# Patient Record
Sex: MALE | Race: BLACK OR AFRICAN AMERICAN | NOT HISPANIC OR LATINO | Employment: OTHER | RURAL
[De-identification: names, ages, dates, MRNs, and addresses within clinical notes are randomized per-mention and may not be internally consistent; named-entity substitution may affect disease eponyms.]

---

## 2021-01-01 ENCOUNTER — ANESTHESIA (OUTPATIENT)
Dept: SURGERY | Facility: HOSPITAL | Age: 66
DRG: 853 | End: 2021-01-01
Payer: MEDICARE

## 2021-01-01 ENCOUNTER — OFFICE VISIT (OUTPATIENT)
Dept: PRIMARY CARE CLINIC | Facility: CLINIC | Age: 66
End: 2021-01-01
Payer: MEDICARE

## 2021-01-01 ENCOUNTER — TELEPHONE (OUTPATIENT)
Dept: PRIMARY CARE CLINIC | Facility: CLINIC | Age: 66
End: 2021-01-01

## 2021-01-01 ENCOUNTER — ANESTHESIA EVENT (OUTPATIENT)
Dept: SURGERY | Facility: HOSPITAL | Age: 66
DRG: 853 | End: 2021-01-01
Payer: MEDICARE

## 2021-01-01 ENCOUNTER — HOSPITAL ENCOUNTER (INPATIENT)
Facility: HOSPITAL | Age: 66
LOS: 184 days | DRG: 003 | End: 2022-06-25
Attending: INTERNAL MEDICINE | Admitting: INTERNAL MEDICINE
Payer: MEDICARE

## 2021-01-01 ENCOUNTER — ANESTHESIA (OUTPATIENT)
Dept: SURGERY | Facility: HOSPITAL | Age: 66
End: 2021-01-01
Payer: MEDICARE

## 2021-01-01 ENCOUNTER — ANESTHESIA EVENT (OUTPATIENT)
Dept: SURGERY | Facility: HOSPITAL | Age: 66
End: 2021-01-01
Payer: MEDICARE

## 2021-01-01 ENCOUNTER — TELEPHONE (OUTPATIENT)
Dept: PRIMARY CARE CLINIC | Facility: CLINIC | Age: 66
End: 2021-01-01
Payer: MEDICARE

## 2021-01-01 ENCOUNTER — HOSPITAL ENCOUNTER (INPATIENT)
Facility: HOSPITAL | Age: 66
LOS: 10 days | Discharge: NURSING, REHAB OR LONG-TERM CARE | DRG: 853 | End: 2021-12-23
Attending: SURGERY | Admitting: SURGERY
Payer: MEDICARE

## 2021-01-01 VITALS
HEART RATE: 77 BPM | RESPIRATION RATE: 16 BRPM | OXYGEN SATURATION: 100 % | TEMPERATURE: 98 F | OXYGEN SATURATION: 99 % | WEIGHT: 211.88 LBS | HEIGHT: 71 IN | BODY MASS INDEX: 29.66 KG/M2 | DIASTOLIC BLOOD PRESSURE: 72 MMHG | SYSTOLIC BLOOD PRESSURE: 169 MMHG | SYSTOLIC BLOOD PRESSURE: 119 MMHG | HEART RATE: 94 BPM | DIASTOLIC BLOOD PRESSURE: 55 MMHG

## 2021-01-01 VITALS
HEIGHT: 71 IN | BODY MASS INDEX: 28 KG/M2 | TEMPERATURE: 98 F | RESPIRATION RATE: 18 BRPM | DIASTOLIC BLOOD PRESSURE: 86 MMHG | SYSTOLIC BLOOD PRESSURE: 132 MMHG | HEART RATE: 61 BPM | WEIGHT: 200 LBS | OXYGEN SATURATION: 99 %

## 2021-01-01 DIAGNOSIS — N49.3 FOURNIER'S GANGRENE IN MALE: ICD-10-CM

## 2021-01-01 DIAGNOSIS — I10 HYPERTENSION, UNSPECIFIED TYPE: Primary | ICD-10-CM

## 2021-01-01 DIAGNOSIS — I69.398 ABNORMALITY OF GAIT AS LATE EFFECT OF CEREBROVASCULAR ACCIDENT (CVA): ICD-10-CM

## 2021-01-01 DIAGNOSIS — D62 ACUTE BLOOD LOSS ANEMIA: ICD-10-CM

## 2021-01-01 DIAGNOSIS — Z13.220 ENCOUNTER FOR SCREENING FOR LIPID DISORDER: ICD-10-CM

## 2021-01-01 DIAGNOSIS — T14.8XXA ABRASION: ICD-10-CM

## 2021-01-01 DIAGNOSIS — N17.9 AKI (ACUTE KIDNEY INJURY): ICD-10-CM

## 2021-01-01 DIAGNOSIS — R65.21 SEPTIC SHOCK: ICD-10-CM

## 2021-01-01 DIAGNOSIS — S61.401A OPEN WOUND OF RIGHT HAND WITHOUT FOREIGN BODY, UNSPECIFIED WOUND TYPE, INITIAL ENCOUNTER: ICD-10-CM

## 2021-01-01 DIAGNOSIS — N49.3 FOURNIER GANGRENE: ICD-10-CM

## 2021-01-01 DIAGNOSIS — I96 GANGRENE: ICD-10-CM

## 2021-01-01 DIAGNOSIS — M72.6 NECROTIZING FASCIITIS: ICD-10-CM

## 2021-01-01 DIAGNOSIS — R57.9 SHOCK: ICD-10-CM

## 2021-01-01 DIAGNOSIS — A41.9 SEPTIC SHOCK: ICD-10-CM

## 2021-01-01 DIAGNOSIS — L97.914: ICD-10-CM

## 2021-01-01 DIAGNOSIS — L97.514: ICD-10-CM

## 2021-01-01 DIAGNOSIS — N17.9 AKI (ACUTE KIDNEY INJURY): Primary | ICD-10-CM

## 2021-01-01 DIAGNOSIS — R26.9 ABNORMALITY OF GAIT AS LATE EFFECT OF CEREBROVASCULAR ACCIDENT (CVA): ICD-10-CM

## 2021-01-01 DIAGNOSIS — I39 ENDOCARDITIS IN DISEASES CLASSIFIED ELSEWHERE: ICD-10-CM

## 2021-01-01 DIAGNOSIS — J96.20 ACUTE ON CHRONIC RESPIRATORY FAILURE, UNSPECIFIED WHETHER WITH HYPOXIA OR HYPERCAPNIA: ICD-10-CM

## 2021-01-01 DIAGNOSIS — L89.620 PRESSURE ULCER OF LEFT HEEL, UNSTAGEABLE: ICD-10-CM

## 2021-01-01 DIAGNOSIS — I10 HYPERTENSION, UNSPECIFIED TYPE: ICD-10-CM

## 2021-01-01 DIAGNOSIS — I25.10 CORONARY ARTERY DISEASE, UNSPECIFIED VESSEL OR LESION TYPE, UNSPECIFIED WHETHER ANGINA PRESENT, UNSPECIFIED WHETHER NATIVE OR TRANSPLANTED HEART: Primary | ICD-10-CM

## 2021-01-01 DIAGNOSIS — I10 HYPERTENSION: ICD-10-CM

## 2021-01-01 DIAGNOSIS — I95.9 HYPOTENSION, UNSPECIFIED HYPOTENSION TYPE: ICD-10-CM

## 2021-01-01 DIAGNOSIS — S61.401D OPEN WOUND OF RIGHT HAND WITHOUT FOREIGN BODY, UNSPECIFIED WOUND TYPE, SUBSEQUENT ENCOUNTER: ICD-10-CM

## 2021-01-01 DIAGNOSIS — Z99.11 ON MECHANICALLY ASSISTED VENTILATION: ICD-10-CM

## 2021-01-01 DIAGNOSIS — I46.9 PEA (PULSELESS ELECTRICAL ACTIVITY): ICD-10-CM

## 2021-01-01 DIAGNOSIS — R10.9 ABDOMINAL PAIN: ICD-10-CM

## 2021-01-01 DIAGNOSIS — L89.154 PRESSURE ULCER OF SACRAL REGION, STAGE 4: ICD-10-CM

## 2021-01-01 DIAGNOSIS — N49.3 FOURNIER GANGRENE: Primary | ICD-10-CM

## 2021-01-01 DIAGNOSIS — L89.150 PRESSURE ULCER OF SACRAL REGION, UNSTAGEABLE: ICD-10-CM

## 2021-01-01 DIAGNOSIS — Q24.9 CARDIAC ABNORMALITY: ICD-10-CM

## 2021-01-01 DIAGNOSIS — R10.84 GENERALIZED ABDOMINAL PAIN: ICD-10-CM

## 2021-01-01 LAB
ABO + RH BLD: NORMAL
ALBUMIN SERPL BCP-MCNC: 1 G/DL (ref 3.5–5)
ALBUMIN SERPL BCP-MCNC: 1.1 G/DL (ref 3.5–5)
ALBUMIN SERPL BCP-MCNC: 1.2 G/DL (ref 3.5–5)
ALBUMIN SERPL BCP-MCNC: 1.7 G/DL (ref 3.5–5)
ALBUMIN SERPL BCP-MCNC: 2.5 G/DL (ref 3.5–5)
ALBUMIN SERPL BCP-MCNC: 3.4 G/DL (ref 3.5–5)
ALBUMIN/GLOB SERPL: 0.2 {RATIO}
ALBUMIN/GLOB SERPL: 0.2 {RATIO}
ALBUMIN/GLOB SERPL: 0.3 {RATIO}
ALBUMIN/GLOB SERPL: 0.4 {RATIO}
ALBUMIN/GLOB SERPL: 0.4 {RATIO}
ALBUMIN/GLOB SERPL: 0.8 {RATIO}
ALP SERPL-CCNC: 113 U/L (ref 45–115)
ALP SERPL-CCNC: 155 U/L (ref 45–115)
ALP SERPL-CCNC: 159 U/L (ref 45–115)
ALP SERPL-CCNC: 69 U/L (ref 45–115)
ALP SERPL-CCNC: 72 U/L (ref 45–115)
ALP SERPL-CCNC: 74 U/L (ref 45–115)
ALT SERPL W P-5'-P-CCNC: 12 U/L (ref 16–61)
ALT SERPL W P-5'-P-CCNC: 13 U/L (ref 16–61)
ALT SERPL W P-5'-P-CCNC: 24 U/L (ref 16–61)
ALT SERPL W P-5'-P-CCNC: 43 U/L (ref 16–61)
ALT SERPL W P-5'-P-CCNC: 71 U/L (ref 16–61)
ALT SERPL W P-5'-P-CCNC: 83 U/L (ref 16–61)
AMYLASE SERPL-CCNC: 53 U/L (ref 25–115)
ANION GAP SERPL CALCULATED.3IONS-SCNC: 11 MMOL/L (ref 7–16)
ANION GAP SERPL CALCULATED.3IONS-SCNC: 20 MMOL/L (ref 7–16)
ANION GAP SERPL CALCULATED.3IONS-SCNC: 21 MMOL/L (ref 7–16)
ANION GAP SERPL CALCULATED.3IONS-SCNC: 23 MMOL/L (ref 7–16)
ANION GAP SERPL CALCULATED.3IONS-SCNC: 24 MMOL/L (ref 7–16)
ANION GAP SERPL CALCULATED.3IONS-SCNC: 25 MMOL/L (ref 7–16)
ANION GAP SERPL CALCULATED.3IONS-SCNC: 25 MMOL/L (ref 7–16)
ANION GAP SERPL CALCULATED.3IONS-SCNC: 26 MMOL/L (ref 7–16)
ANION GAP SERPL CALCULATED.3IONS-SCNC: 26 MMOL/L (ref 7–16)
ANION GAP SERPL CALCULATED.3IONS-SCNC: 27 MMOL/L (ref 7–16)
ANION GAP SERPL CALCULATED.3IONS-SCNC: 28 MMOL/L (ref 7–16)
ANION GAP SERPL CALCULATED.3IONS-SCNC: 29 MMOL/L (ref 7–16)
ANION GAP SERPL CALCULATED.3IONS-SCNC: 30 MMOL/L (ref 7–16)
ANION GAP SERPL CALCULATED.3IONS-SCNC: 30 MMOL/L (ref 7–16)
ANION GAP SERPL CALCULATED.3IONS-SCNC: 31 MMOL/L (ref 7–16)
ANION GAP SERPL CALCULATED.3IONS-SCNC: 33 MMOL/L (ref 7–16)
ANISOCYTOSIS BLD QL SMEAR: ABNORMAL
AORTIC ROOT ANNULUS: 3.5 CM
AORTIC VALVE CUSP SEPERATION: 26.2 CM
AST SERPL W P-5'-P-CCNC: 141 U/L (ref 15–37)
AST SERPL W P-5'-P-CCNC: 159 U/L (ref 15–37)
AST SERPL W P-5'-P-CCNC: 175 U/L (ref 15–37)
AST SERPL W P-5'-P-CCNC: 19 U/L (ref 15–37)
AST SERPL W P-5'-P-CCNC: 44 U/L (ref 15–37)
AST SERPL W P-5'-P-CCNC: 52 U/L (ref 15–37)
AV INDEX (PROSTH): 0.8
AV MEAN GRADIENT: 4 MMHG
AV PEAK GRADIENT: 5 MMHG
AV VALVE AREA: 2.77 CM2
AV VELOCITY RATIO: 0.91
BACTERIA #/AREA URNS HPF: ABNORMAL /HPF
BACTERIA BLD CULT: NORMAL
BACTERIA BLD CULT: NORMAL
BACTERIA SPEC ANAEROBE CULT: ABNORMAL
BASO STIPL BLD QL SMEAR: ABNORMAL
BASOPHILS # BLD AUTO: 0.02 K/UL (ref 0–0.2)
BASOPHILS # BLD AUTO: 0.03 K/UL (ref 0–0.2)
BASOPHILS # BLD AUTO: 0.04 K/UL (ref 0–0.2)
BASOPHILS # BLD AUTO: 0.05 K/UL (ref 0–0.2)
BASOPHILS # BLD AUTO: 0.06 K/UL (ref 0–0.2)
BASOPHILS # BLD AUTO: 0.08 K/UL (ref 0–0.2)
BASOPHILS # BLD AUTO: 0.09 K/UL (ref 0–0.2)
BASOPHILS NFR BLD AUTO: 0.1 % (ref 0–1)
BASOPHILS NFR BLD AUTO: 0.1 % (ref 0–1)
BASOPHILS NFR BLD AUTO: 0.2 % (ref 0–1)
BASOPHILS NFR BLD AUTO: 0.3 % (ref 0–1)
BASOPHILS NFR BLD AUTO: 0.4 % (ref 0–1)
BASOPHILS NFR BLD AUTO: 0.5 % (ref 0–1)
BASOPHILS NFR BLD AUTO: 0.8 % (ref 0–1)
BASOPHILS NFR BLD AUTO: 1.3 % (ref 0–1)
BILIRUB SERPL-MCNC: 0.2 MG/DL (ref 0–1.2)
BILIRUB SERPL-MCNC: 0.5 MG/DL (ref 0–1.2)
BILIRUB SERPL-MCNC: 0.5 MG/DL (ref 0–1.2)
BILIRUB SERPL-MCNC: 1.2 MG/DL (ref 0–1.2)
BILIRUB SERPL-MCNC: 2.8 MG/DL (ref 0–1.2)
BILIRUB SERPL-MCNC: 2.9 MG/DL (ref 0–1.2)
BILIRUB UR QL STRIP: NEGATIVE
BLD PROD TYP BPU: NORMAL
BLOOD UNIT EXPIRATION DATE: NORMAL
BLOOD UNIT TYPE CODE: 5100
BLOOD UNIT TYPE CODE: 9500
BSA FOR ECHO PROCEDURE: 2.15 M2
BUN SERPL-MCNC: 107 MG/DL (ref 7–18)
BUN SERPL-MCNC: 112 MG/DL (ref 7–18)
BUN SERPL-MCNC: 116 MG/DL (ref 7–18)
BUN SERPL-MCNC: 117 MG/DL (ref 7–18)
BUN SERPL-MCNC: 140 MG/DL (ref 7–18)
BUN SERPL-MCNC: 140 MG/DL (ref 7–18)
BUN SERPL-MCNC: 177 MG/DL (ref 7–18)
BUN SERPL-MCNC: 20 MG/DL (ref 7–18)
BUN SERPL-MCNC: 56 MG/DL (ref 7–18)
BUN SERPL-MCNC: 58 MG/DL (ref 7–18)
BUN SERPL-MCNC: 61 MG/DL (ref 7–18)
BUN SERPL-MCNC: 66 MG/DL (ref 7–18)
BUN SERPL-MCNC: 76 MG/DL (ref 7–18)
BUN SERPL-MCNC: 78 MG/DL (ref 7–18)
BUN SERPL-MCNC: 78 MG/DL (ref 7–18)
BUN SERPL-MCNC: 82 MG/DL (ref 7–18)
BUN SERPL-MCNC: 95 MG/DL (ref 7–18)
BUN SERPL-MCNC: 99 MG/DL (ref 7–18)
BUN/CREAT SERPL: 10 (ref 6–20)
BUN/CREAT SERPL: 10 (ref 6–20)
BUN/CREAT SERPL: 11 (ref 6–20)
BUN/CREAT SERPL: 12 (ref 6–20)
BUN/CREAT SERPL: 13 (ref 6–20)
BUN/CREAT SERPL: 15 (ref 6–20)
BUN/CREAT SERPL: 15 (ref 6–20)
BUN/CREAT SERPL: 16 (ref 6–20)
BUN/CREAT SERPL: 17 (ref 6–20)
BUN/CREAT SERPL: 18 (ref 6–20)
BUN/CREAT SERPL: 18 (ref 6–20)
BUN/CREAT SERPL: 9 (ref 6–20)
BURR CELLS BLD QL SMEAR: ABNORMAL
CA-I SERPL-MCNC: 0.75 MMOL/L (ref 1.15–1.35)
CA-I SERPL-MCNC: 0.77 MMOL/L (ref 1.15–1.35)
CA-I SERPL-MCNC: 0.82 MMOL/L (ref 1.15–1.35)
CA-I SERPL-MCNC: 0.83 MMOL/L (ref 1.15–1.35)
CA-I SERPL-MCNC: 0.92 MMOL/L (ref 1.15–1.35)
CA-I SERPL-MCNC: 0.95 MMOL/L (ref 1.15–1.35)
CALCIUM SERPL-MCNC: 5.4 MG/DL (ref 8.5–10.1)
CALCIUM SERPL-MCNC: 6.4 MG/DL (ref 8.5–10.1)
CALCIUM SERPL-MCNC: 6.6 MG/DL (ref 8.5–10.1)
CALCIUM SERPL-MCNC: 6.8 MG/DL (ref 8.5–10.1)
CALCIUM SERPL-MCNC: 6.8 MG/DL (ref 8.5–10.1)
CALCIUM SERPL-MCNC: 6.9 MG/DL (ref 8.5–10.1)
CALCIUM SERPL-MCNC: 7 MG/DL (ref 8.5–10.1)
CALCIUM SERPL-MCNC: 7.2 MG/DL (ref 8.5–10.1)
CALCIUM SERPL-MCNC: 7.3 MG/DL (ref 8.5–10.1)
CALCIUM SERPL-MCNC: 7.4 MG/DL (ref 8.5–10.1)
CALCIUM SERPL-MCNC: 7.5 MG/DL (ref 8.5–10.1)
CALCIUM SERPL-MCNC: 8 MG/DL (ref 8.5–10.1)
CALCIUM SERPL-MCNC: 8.1 MG/DL (ref 8.5–10.1)
CALCIUM SERPL-MCNC: 8.7 MG/DL (ref 8.5–10.1)
CHLORIDE SERPL-SCNC: 102 MMOL/L (ref 98–107)
CHLORIDE SERPL-SCNC: 105 MMOL/L (ref 98–107)
CHLORIDE SERPL-SCNC: 106 MMOL/L (ref 98–107)
CHLORIDE SERPL-SCNC: 107 MMOL/L (ref 98–107)
CHLORIDE SERPL-SCNC: 110 MMOL/L (ref 98–107)
CHLORIDE SERPL-SCNC: 113 MMOL/L (ref 98–107)
CHLORIDE SERPL-SCNC: 114 MMOL/L (ref 98–107)
CHLORIDE SERPL-SCNC: 84 MMOL/L (ref 98–107)
CHLORIDE SERPL-SCNC: 84 MMOL/L (ref 98–107)
CHLORIDE SERPL-SCNC: 89 MMOL/L (ref 98–107)
CHLORIDE SERPL-SCNC: 90 MMOL/L (ref 98–107)
CHLORIDE SERPL-SCNC: 93 MMOL/L (ref 98–107)
CHLORIDE SERPL-SCNC: 94 MMOL/L (ref 98–107)
CHLORIDE SERPL-SCNC: 94 MMOL/L (ref 98–107)
CHLORIDE SERPL-SCNC: 95 MMOL/L (ref 98–107)
CHLORIDE SERPL-SCNC: 96 MMOL/L (ref 98–107)
CHLORIDE UR-SCNC: 91 MMOL/L
CHOLEST SERPL-MCNC: 171 MG/DL (ref 0–200)
CHOLEST/HDLC SERPL: 4.8 {RATIO}
CLARITY UR: CLEAR
CO2 SERPL-SCNC: 12 MMOL/L (ref 21–32)
CO2 SERPL-SCNC: 13 MMOL/L (ref 21–32)
CO2 SERPL-SCNC: 13 MMOL/L (ref 21–32)
CO2 SERPL-SCNC: 14 MMOL/L (ref 21–32)
CO2 SERPL-SCNC: 15 MMOL/L (ref 21–32)
CO2 SERPL-SCNC: 15 MMOL/L (ref 21–32)
CO2 SERPL-SCNC: 16 MMOL/L (ref 21–32)
CO2 SERPL-SCNC: 17 MMOL/L (ref 21–32)
CO2 SERPL-SCNC: 20 MMOL/L (ref 21–32)
CO2 SERPL-SCNC: 21 MMOL/L (ref 21–32)
CO2 SERPL-SCNC: 21 MMOL/L (ref 21–32)
CO2 SERPL-SCNC: 22 MMOL/L (ref 21–32)
CO2 SERPL-SCNC: 24 MMOL/L (ref 21–32)
COARSE GRAN CASTS #/AREA URNS LPF: ABNORMAL /LPF
COLOR UR: YELLOW
CREAT SERPL-MCNC: 1.16 MG/DL (ref 0.7–1.3)
CREAT SERPL-MCNC: 11.01 MG/DL (ref 0.7–1.3)
CREAT SERPL-MCNC: 5.42 MG/DL (ref 0.7–1.3)
CREAT SERPL-MCNC: 6.17 MG/DL (ref 0.7–1.3)
CREAT SERPL-MCNC: 6.35 MG/DL (ref 0.7–1.3)
CREAT SERPL-MCNC: 6.36 MG/DL (ref 0.7–1.3)
CREAT SERPL-MCNC: 6.51 MG/DL (ref 0.7–1.3)
CREAT SERPL-MCNC: 6.67 MG/DL (ref 0.7–1.3)
CREAT SERPL-MCNC: 7.36 MG/DL (ref 0.7–1.3)
CREAT SERPL-MCNC: 7.79 MG/DL (ref 0.7–1.3)
CREAT SERPL-MCNC: 7.9 MG/DL (ref 0.7–1.3)
CREAT SERPL-MCNC: 7.91 MG/DL (ref 0.7–1.3)
CREAT SERPL-MCNC: 8.21 MG/DL (ref 0.7–1.3)
CREAT SERPL-MCNC: 8.29 MG/DL (ref 0.7–1.3)
CREAT SERPL-MCNC: 9.14 MG/DL (ref 0.7–1.3)
CREAT SERPL-MCNC: 9.36 MG/DL (ref 0.7–1.3)
CREAT SERPL-MCNC: 9.55 MG/DL (ref 0.7–1.3)
CREAT SERPL-MCNC: 9.63 MG/DL (ref 0.7–1.3)
CRENATED CELLS: ABNORMAL
CROSSMATCH INTERPRETATION: NORMAL
CV ECHO LV RWT: 0.74 CM
DIFFERENTIAL METHOD BLD: ABNORMAL
DISPENSE STATUS: NORMAL
DOHLE BOD BLD QL SMEAR: ABNORMAL
DOHLE BOD BLD QL SMEAR: ABNORMAL
DOP CALC AO PEAK VEL: 1.1 M/S
DOP CALC AO VTI: 20 CM
DOP CALC LVOT AREA: 3.5 CM2
DOP CALC LVOT DIAMETER: 2.1 CM
DOP CALC LVOT PEAK VEL: 1 M/S
DOP CALC LVOT STROKE VOLUME: 55.39 CM3
DOP CALCLVOT PEAK VEL VTI: 16 CM
E WAVE DECELERATION TIME: 163 MSEC
ECHO EF ESTIMATED: 55 %
ECHO LV POSTERIOR WALL: 1.57 CM (ref 0.6–1.1)
EJECTION FRACTION: 40 %
EOSINOPHIL # BLD AUTO: 0 K/UL (ref 0–0.5)
EOSINOPHIL # BLD AUTO: 0.01 K/UL (ref 0–0.5)
EOSINOPHIL # BLD AUTO: 0.02 K/UL (ref 0–0.5)
EOSINOPHIL # BLD AUTO: 0.03 K/UL (ref 0–0.5)
EOSINOPHIL # BLD AUTO: 0.04 K/UL (ref 0–0.5)
EOSINOPHIL # BLD AUTO: 0.05 K/UL (ref 0–0.5)
EOSINOPHIL # BLD AUTO: 0.07 K/UL (ref 0–0.5)
EOSINOPHIL # BLD AUTO: 0.07 K/UL (ref 0–0.5)
EOSINOPHIL # BLD AUTO: 0.08 K/UL (ref 0–0.5)
EOSINOPHIL # BLD AUTO: 0.1 K/UL (ref 0–0.5)
EOSINOPHIL # BLD AUTO: 0.12 K/UL (ref 0–0.5)
EOSINOPHIL # BLD AUTO: 0.24 K/UL (ref 0–0.5)
EOSINOPHIL NFR BLD AUTO: 0 % (ref 1–4)
EOSINOPHIL NFR BLD AUTO: 0.1 % (ref 1–4)
EOSINOPHIL NFR BLD AUTO: 0.2 % (ref 1–4)
EOSINOPHIL NFR BLD AUTO: 0.3 % (ref 1–4)
EOSINOPHIL NFR BLD AUTO: 0.3 % (ref 1–4)
EOSINOPHIL NFR BLD AUTO: 0.4 % (ref 1–4)
EOSINOPHIL NFR BLD AUTO: 0.4 % (ref 1–4)
EOSINOPHIL NFR BLD AUTO: 0.5 % (ref 1–4)
EOSINOPHIL NFR BLD AUTO: 0.7 % (ref 1–4)
EOSINOPHIL NFR BLD AUTO: 5.1 % (ref 1–4)
EOSINOPHIL NFR BLD MANUAL: 1 % (ref 1–4)
EOSINOPHIL NFR BLD MANUAL: 1 % (ref 1–4)
EOSINOPHIL NFR BLD MANUAL: 3 % (ref 1–4)
ERYTHROCYTE [DISTWIDTH] IN BLOOD BY AUTOMATED COUNT: 13.3 % (ref 11.5–14.5)
ERYTHROCYTE [DISTWIDTH] IN BLOOD BY AUTOMATED COUNT: 13.8 % (ref 11.5–14.5)
ERYTHROCYTE [DISTWIDTH] IN BLOOD BY AUTOMATED COUNT: 14.1 % (ref 11.5–14.5)
ERYTHROCYTE [DISTWIDTH] IN BLOOD BY AUTOMATED COUNT: 14.3 % (ref 11.5–14.5)
ERYTHROCYTE [DISTWIDTH] IN BLOOD BY AUTOMATED COUNT: 14.3 % (ref 11.5–14.5)
ERYTHROCYTE [DISTWIDTH] IN BLOOD BY AUTOMATED COUNT: 14.5 % (ref 11.5–14.5)
ERYTHROCYTE [DISTWIDTH] IN BLOOD BY AUTOMATED COUNT: 14.6 % (ref 11.5–14.5)
ERYTHROCYTE [DISTWIDTH] IN BLOOD BY AUTOMATED COUNT: 14.6 % (ref 11.5–14.5)
ERYTHROCYTE [DISTWIDTH] IN BLOOD BY AUTOMATED COUNT: 14.8 % (ref 11.5–14.5)
ERYTHROCYTE [DISTWIDTH] IN BLOOD BY AUTOMATED COUNT: 16.2 % (ref 11.5–14.5)
ERYTHROCYTE [DISTWIDTH] IN BLOOD BY AUTOMATED COUNT: 16.3 % (ref 11.5–14.5)
ERYTHROCYTE [DISTWIDTH] IN BLOOD BY AUTOMATED COUNT: 17.2 % (ref 11.5–14.5)
ERYTHROCYTE [DISTWIDTH] IN BLOOD BY AUTOMATED COUNT: 17.5 % (ref 11.5–14.5)
ERYTHROCYTE [DISTWIDTH] IN BLOOD BY AUTOMATED COUNT: 17.6 % (ref 11.5–14.5)
ERYTHROCYTE [DISTWIDTH] IN BLOOD BY AUTOMATED COUNT: 17.8 % (ref 11.5–14.5)
ERYTHROCYTE [DISTWIDTH] IN BLOOD BY AUTOMATED COUNT: 18.4 % (ref 11.5–14.5)
ESTROGEN SERPL-MCNC: NORMAL PG/ML
FLUAV AG UPPER RESP QL IA.RAPID: NEGATIVE
FLUBV AG UPPER RESP QL IA.RAPID: NEGATIVE
FRACTIONAL SHORTENING: 24 % (ref 28–44)
GENTAMICIN SERPL-MCNC: 1 ΜG/ML (ref 0–1.9)
GLOBULIN SER-MCNC: 4.1 G/DL (ref 2–4)
GLOBULIN SER-MCNC: 4.7 G/DL (ref 2–4)
GLOBULIN SER-MCNC: 4.8 G/DL (ref 2–4)
GLOBULIN SER-MCNC: 5.6 G/DL (ref 2–4)
GLOBULIN SER-MCNC: 6.2 G/DL (ref 2–4)
GLOBULIN SER-MCNC: 6.2 G/DL (ref 2–4)
GLUCOSE SERPL-MCNC: 101 MG/DL (ref 70–105)
GLUCOSE SERPL-MCNC: 104 MG/DL (ref 70–105)
GLUCOSE SERPL-MCNC: 111 MG/DL (ref 70–105)
GLUCOSE SERPL-MCNC: 112 MG/DL (ref 70–105)
GLUCOSE SERPL-MCNC: 113 MG/DL (ref 74–106)
GLUCOSE SERPL-MCNC: 119 MG/DL (ref 70–105)
GLUCOSE SERPL-MCNC: 121 MG/DL (ref 70–105)
GLUCOSE SERPL-MCNC: 124 MG/DL (ref 70–105)
GLUCOSE SERPL-MCNC: 128 MG/DL (ref 74–106)
GLUCOSE SERPL-MCNC: 132 MG/DL (ref 70–105)
GLUCOSE SERPL-MCNC: 136 MG/DL (ref 74–106)
GLUCOSE SERPL-MCNC: 145 MG/DL (ref 70–105)
GLUCOSE SERPL-MCNC: 148 MG/DL (ref 70–105)
GLUCOSE SERPL-MCNC: 152 MG/DL (ref 70–105)
GLUCOSE SERPL-MCNC: 153 MG/DL (ref 74–106)
GLUCOSE SERPL-MCNC: 154 MG/DL (ref 70–105)
GLUCOSE SERPL-MCNC: 156 MG/DL (ref 74–106)
GLUCOSE SERPL-MCNC: 161 MG/DL (ref 70–105)
GLUCOSE SERPL-MCNC: 161 MG/DL (ref 74–106)
GLUCOSE SERPL-MCNC: 162 MG/DL (ref 70–105)
GLUCOSE SERPL-MCNC: 163 MG/DL (ref 70–105)
GLUCOSE SERPL-MCNC: 168 MG/DL (ref 70–105)
GLUCOSE SERPL-MCNC: 169 MG/DL (ref 70–105)
GLUCOSE SERPL-MCNC: 169 MG/DL (ref 74–106)
GLUCOSE SERPL-MCNC: 169 MG/DL (ref 74–106)
GLUCOSE SERPL-MCNC: 173 MG/DL (ref 70–105)
GLUCOSE SERPL-MCNC: 174 MG/DL (ref 70–105)
GLUCOSE SERPL-MCNC: 176 MG/DL (ref 70–105)
GLUCOSE SERPL-MCNC: 176 MG/DL (ref 74–106)
GLUCOSE SERPL-MCNC: 178 MG/DL (ref 70–105)
GLUCOSE SERPL-MCNC: 178 MG/DL (ref 70–105)
GLUCOSE SERPL-MCNC: 182 MG/DL (ref 70–105)
GLUCOSE SERPL-MCNC: 182 MG/DL (ref 70–105)
GLUCOSE SERPL-MCNC: 182 MG/DL (ref 74–106)
GLUCOSE SERPL-MCNC: 183 MG/DL (ref 70–105)
GLUCOSE SERPL-MCNC: 184 MG/DL (ref 70–105)
GLUCOSE SERPL-MCNC: 187 MG/DL (ref 70–105)
GLUCOSE SERPL-MCNC: 189 MG/DL (ref 74–106)
GLUCOSE SERPL-MCNC: 190 MG/DL (ref 70–105)
GLUCOSE SERPL-MCNC: 190 MG/DL (ref 70–105)
GLUCOSE SERPL-MCNC: 191 MG/DL (ref 70–105)
GLUCOSE SERPL-MCNC: 191 MG/DL (ref 70–105)
GLUCOSE SERPL-MCNC: 193 MG/DL (ref 70–105)
GLUCOSE SERPL-MCNC: 195 MG/DL (ref 70–105)
GLUCOSE SERPL-MCNC: 197 MG/DL (ref 70–105)
GLUCOSE SERPL-MCNC: 198 MG/DL (ref 70–105)
GLUCOSE SERPL-MCNC: 200 MG/DL (ref 70–105)
GLUCOSE SERPL-MCNC: 203 MG/DL (ref 70–105)
GLUCOSE SERPL-MCNC: 204 MG/DL (ref 70–105)
GLUCOSE SERPL-MCNC: 205 MG/DL (ref 70–105)
GLUCOSE SERPL-MCNC: 217 MG/DL (ref 70–105)
GLUCOSE SERPL-MCNC: 219 MG/DL (ref 70–105)
GLUCOSE SERPL-MCNC: 225 MG/DL (ref 70–105)
GLUCOSE SERPL-MCNC: 241 MG/DL (ref 70–105)
GLUCOSE SERPL-MCNC: 246 MG/DL (ref 70–105)
GLUCOSE SERPL-MCNC: 251 MG/DL (ref 74–106)
GLUCOSE SERPL-MCNC: 262 MG/DL (ref 70–105)
GLUCOSE SERPL-MCNC: 275 MG/DL (ref 70–105)
GLUCOSE SERPL-MCNC: 282 MG/DL (ref 74–106)
GLUCOSE SERPL-MCNC: 283 MG/DL (ref 70–105)
GLUCOSE SERPL-MCNC: 296 MG/DL (ref 74–106)
GLUCOSE SERPL-MCNC: 299 MG/DL (ref 70–105)
GLUCOSE SERPL-MCNC: 299 MG/DL (ref 70–105)
GLUCOSE SERPL-MCNC: 304 MG/DL (ref 70–105)
GLUCOSE SERPL-MCNC: 311 MG/DL (ref 70–105)
GLUCOSE SERPL-MCNC: 312 MG/DL (ref 70–105)
GLUCOSE SERPL-MCNC: 313 MG/DL (ref 70–105)
GLUCOSE SERPL-MCNC: 318 MG/DL (ref 70–105)
GLUCOSE SERPL-MCNC: 319 MG/DL (ref 70–105)
GLUCOSE SERPL-MCNC: 320 MG/DL (ref 70–105)
GLUCOSE SERPL-MCNC: 327 MG/DL (ref 74–106)
GLUCOSE SERPL-MCNC: 328 MG/DL (ref 74–106)
GLUCOSE SERPL-MCNC: 341 MG/DL (ref 70–105)
GLUCOSE SERPL-MCNC: 347 MG/DL (ref 74–106)
GLUCOSE SERPL-MCNC: 349 MG/DL (ref 70–105)
GLUCOSE SERPL-MCNC: 350 MG/DL (ref 70–105)
GLUCOSE SERPL-MCNC: 367 MG/DL (ref 70–105)
GLUCOSE SERPL-MCNC: 394 MG/DL (ref 70–105)
GLUCOSE SERPL-MCNC: 427 MG/DL (ref 70–105)
GLUCOSE SERPL-MCNC: 428 MG/DL (ref 70–105)
GLUCOSE SERPL-MCNC: 429 MG/DL (ref 70–105)
GLUCOSE SERPL-MCNC: 68 MG/DL (ref 70–105)
GLUCOSE SERPL-MCNC: 69 MG/DL (ref 70–105)
GLUCOSE SERPL-MCNC: 76 MG/DL (ref 70–105)
GLUCOSE SERPL-MCNC: 77 MG/DL (ref 70–105)
GLUCOSE SERPL-MCNC: 82 MG/DL (ref 70–105)
GLUCOSE SERPL-MCNC: 83 MG/DL (ref 70–105)
GLUCOSE SERPL-MCNC: 86 MG/DL (ref 74–106)
GLUCOSE SERPL-MCNC: 97 MG/DL (ref 70–105)
GLUCOSE UR STRIP-MCNC: 250 MG/DL
HAV IGM SER QL: NORMAL
HBV CORE IGM SER QL: NORMAL
HBV SURFACE AG SERPL QL IA: NORMAL
HCO3 UR-SCNC: 12.3 MMOL/L (ref 21–28)
HCO3 UR-SCNC: 14.7 MMOL/L (ref 21–28)
HCO3 UR-SCNC: 16.6 MMOL/L (ref 21–28)
HCO3 UR-SCNC: 19.7 MMOL/L (ref 21–28)
HCO3 UR-SCNC: 19.8 MMOL/L (ref 21–28)
HCO3 UR-SCNC: 19.9 MMOL/L (ref 21–28)
HCO3 UR-SCNC: 22.3 MMOL/L (ref 21–28)
HCO3 UR-SCNC: 22.5 MMOL/L (ref 21–28)
HCT VFR BLD AUTO: 16 % (ref 40–54)
HCT VFR BLD AUTO: 18.6 % (ref 40–54)
HCT VFR BLD AUTO: 19.6 % (ref 40–54)
HCT VFR BLD AUTO: 19.8 % (ref 40–54)
HCT VFR BLD AUTO: 20.6 % (ref 40–54)
HCT VFR BLD AUTO: 20.8 % (ref 40–54)
HCT VFR BLD AUTO: 20.9 % (ref 40–54)
HCT VFR BLD AUTO: 22.3 % (ref 40–54)
HCT VFR BLD AUTO: 23 % (ref 40–54)
HCT VFR BLD AUTO: 23.4 % (ref 40–54)
HCT VFR BLD AUTO: 23.6 % (ref 40–54)
HCT VFR BLD AUTO: 24.4 % (ref 40–54)
HCT VFR BLD AUTO: 25 % (ref 40–54)
HCT VFR BLD AUTO: 25.3 % (ref 40–54)
HCT VFR BLD AUTO: 25.9 % (ref 40–54)
HCT VFR BLD AUTO: 30.3 % (ref 40–54)
HCT VFR BLD AUTO: 33.4 % (ref 40–54)
HCT VFR BLD AUTO: 37.6 % (ref 40–54)
HCT VFR BLD AUTO: 38.6 % (ref 40–54)
HCV AB SER QL: NORMAL
HDLC SERPL-MCNC: 36 MG/DL (ref 40–60)
HGB BLD-MCNC: 10.7 G/DL (ref 13.5–18)
HGB BLD-MCNC: 11.7 G/DL (ref 13.5–18)
HGB BLD-MCNC: 12.6 G/DL (ref 13.5–18)
HGB BLD-MCNC: 12.6 G/DL (ref 13.5–18)
HGB BLD-MCNC: 5.5 G/DL (ref 13.5–18)
HGB BLD-MCNC: 6.5 G/DL (ref 13.5–18)
HGB BLD-MCNC: 6.7 G/DL (ref 13.5–18)
HGB BLD-MCNC: 6.8 G/DL (ref 13.5–18)
HGB BLD-MCNC: 7.3 G/DL (ref 13.5–18)
HGB BLD-MCNC: 7.5 G/DL (ref 13.5–18)
HGB BLD-MCNC: 7.7 G/DL (ref 13.5–18)
HGB BLD-MCNC: 7.9 G/DL (ref 13.5–18)
HGB BLD-MCNC: 8 G/DL (ref 13.5–18)
HGB BLD-MCNC: 8.4 G/DL (ref 13.5–18)
HGB BLD-MCNC: 8.8 G/DL (ref 13.5–18)
HGB BLD-MCNC: 8.9 G/DL (ref 13.5–18)
HGB BLD-MCNC: 9.1 G/DL (ref 13.5–18)
IMM GRANULOCYTES # BLD AUTO: 0.02 K/UL (ref 0–0.04)
IMM GRANULOCYTES # BLD AUTO: 0.04 K/UL (ref 0–0.04)
IMM GRANULOCYTES # BLD AUTO: 0.09 K/UL (ref 0–0.04)
IMM GRANULOCYTES # BLD AUTO: 0.21 K/UL (ref 0–0.04)
IMM GRANULOCYTES # BLD AUTO: 0.5 K/UL (ref 0–0.04)
IMM GRANULOCYTES # BLD AUTO: 0.57 K/UL (ref 0–0.04)
IMM GRANULOCYTES # BLD AUTO: 0.58 K/UL (ref 0–0.04)
IMM GRANULOCYTES # BLD AUTO: 0.86 K/UL (ref 0–0.04)
IMM GRANULOCYTES # BLD AUTO: 0.89 K/UL (ref 0–0.04)
IMM GRANULOCYTES # BLD AUTO: 1.83 K/UL (ref 0–0.04)
IMM GRANULOCYTES # BLD AUTO: 2.15 K/UL (ref 0–0.04)
IMM GRANULOCYTES # BLD AUTO: 2.25 K/UL (ref 0–0.04)
IMM GRANULOCYTES # BLD AUTO: 2.5 K/UL (ref 0–0.04)
IMM GRANULOCYTES # BLD AUTO: 2.55 K/UL (ref 0–0.04)
IMM GRANULOCYTES # BLD AUTO: 2.93 K/UL (ref 0–0.04)
IMM GRANULOCYTES # BLD AUTO: 3.62 K/UL (ref 0–0.04)
IMM GRANULOCYTES NFR BLD: 0.4 % (ref 0–0.4)
IMM GRANULOCYTES NFR BLD: 0.5 % (ref 0–0.4)
IMM GRANULOCYTES NFR BLD: 0.8 % (ref 0–0.4)
IMM GRANULOCYTES NFR BLD: 1.9 % (ref 0–0.4)
IMM GRANULOCYTES NFR BLD: 10.5 % (ref 0–0.4)
IMM GRANULOCYTES NFR BLD: 12.5 % (ref 0–0.4)
IMM GRANULOCYTES NFR BLD: 13.2 % (ref 0–0.4)
IMM GRANULOCYTES NFR BLD: 13.4 % (ref 0–0.4)
IMM GRANULOCYTES NFR BLD: 14 % (ref 0–0.4)
IMM GRANULOCYTES NFR BLD: 14.9 % (ref 0–0.4)
IMM GRANULOCYTES NFR BLD: 15.6 % (ref 0–0.4)
IMM GRANULOCYTES NFR BLD: 3.2 % (ref 0–0.4)
IMM GRANULOCYTES NFR BLD: 3.3 % (ref 0–0.4)
IMM GRANULOCYTES NFR BLD: 4.1 % (ref 0–0.4)
IMM GRANULOCYTES NFR BLD: 5.5 % (ref 0–0.4)
IMM GRANULOCYTES NFR BLD: 6.4 % (ref 0–0.4)
INDIRECT COOMBS: NORMAL
INR BLD: 1.35 (ref 0.9–1.1)
INTERVENTRICULAR SEPTUM: 1.38 CM (ref 0.6–1.1)
IVC OSTIUM: 1.3 CM
KETONES UR STRIP-SCNC: NEGATIVE MG/DL
LAB AP GROSS DESCRIPTION: NORMAL
LAB AP LABORATORY NOTES: NORMAL
LACTATE SERPL-SCNC: 1.6 MMOL/L (ref 0.4–2)
LACTATE SERPL-SCNC: 1.6 MMOL/L (ref 0.4–2)
LACTATE SERPL-SCNC: 1.8 MMOL/L (ref 0.4–2)
LACTATE SERPL-SCNC: 1.9 MMOL/L (ref 0.4–2)
LACTATE SERPL-SCNC: 2 MMOL/L (ref 0.4–2)
LACTATE SERPL-SCNC: 2.1 MMOL/L (ref 0.4–2)
LACTATE SERPL-SCNC: 2.3 MMOL/L (ref 0.4–2)
LDLC SERPL CALC-MCNC: 120 MG/DL
LDLC/HDLC SERPL: 3.3 {RATIO}
LEFT ATRIUM SIZE: 3.9 CM
LEFT INTERNAL DIMENSION IN SYSTOLE: 3.21 CM (ref 2.1–4)
LEFT VENTRICLE DIASTOLIC VOLUME INDEX: 36.38 ML/M2
LEFT VENTRICLE DIASTOLIC VOLUME: 80.4 ML
LEFT VENTRICLE MASS INDEX: 112 G/M2
LEFT VENTRICLE SYSTOLIC VOLUME INDEX: 18.7 ML/M2
LEFT VENTRICLE SYSTOLIC VOLUME: 41.3 ML
LEFT VENTRICULAR INTERNAL DIMENSION IN DIASTOLE: 4.24 CM (ref 3.5–6)
LEFT VENTRICULAR MASS: 246.43 G
LEUKOCYTE ESTERASE UR QL STRIP: NEGATIVE
LIPASE SERPL-CCNC: 36 U/L (ref 73–393)
LVOT MG: 2 MMHG
LYMPHOCYTES # BLD AUTO: 0.47 K/UL (ref 1–4.8)
LYMPHOCYTES # BLD AUTO: 0.48 K/UL (ref 1–4.8)
LYMPHOCYTES # BLD AUTO: 0.5 K/UL (ref 1–4.8)
LYMPHOCYTES # BLD AUTO: 0.51 K/UL (ref 1–4.8)
LYMPHOCYTES # BLD AUTO: 0.61 K/UL (ref 1–4.8)
LYMPHOCYTES # BLD AUTO: 0.65 K/UL (ref 1–4.8)
LYMPHOCYTES # BLD AUTO: 0.66 K/UL (ref 1–4.8)
LYMPHOCYTES # BLD AUTO: 0.68 K/UL (ref 1–4.8)
LYMPHOCYTES # BLD AUTO: 0.69 K/UL (ref 1–4.8)
LYMPHOCYTES # BLD AUTO: 0.82 K/UL (ref 1–4.8)
LYMPHOCYTES # BLD AUTO: 0.9 K/UL (ref 1–4.8)
LYMPHOCYTES # BLD AUTO: 0.93 K/UL (ref 1–4.8)
LYMPHOCYTES # BLD AUTO: 0.94 K/UL (ref 1–4.8)
LYMPHOCYTES # BLD AUTO: 1.04 K/UL (ref 1–4.8)
LYMPHOCYTES # BLD AUTO: 1.08 K/UL (ref 1–4.8)
LYMPHOCYTES # BLD AUTO: 1.6 K/UL (ref 1–4.8)
LYMPHOCYTES NFR BLD AUTO: 1.4 % (ref 27–41)
LYMPHOCYTES NFR BLD AUTO: 11.5 % (ref 27–41)
LYMPHOCYTES NFR BLD AUTO: 12.8 % (ref 27–41)
LYMPHOCYTES NFR BLD AUTO: 2.2 % (ref 27–41)
LYMPHOCYTES NFR BLD AUTO: 2.3 % (ref 27–41)
LYMPHOCYTES NFR BLD AUTO: 2.8 % (ref 27–41)
LYMPHOCYTES NFR BLD AUTO: 3.5 % (ref 27–41)
LYMPHOCYTES NFR BLD AUTO: 34.2 % (ref 27–41)
LYMPHOCYTES NFR BLD AUTO: 4 % (ref 27–41)
LYMPHOCYTES NFR BLD AUTO: 4.5 % (ref 27–41)
LYMPHOCYTES NFR BLD AUTO: 4.6 % (ref 27–41)
LYMPHOCYTES NFR BLD AUTO: 5.3 % (ref 27–41)
LYMPHOCYTES NFR BLD AUTO: 5.5 % (ref 27–41)
LYMPHOCYTES NFR BLD AUTO: 6 % (ref 27–41)
LYMPHOCYTES NFR BLD AUTO: 6.1 % (ref 27–41)
LYMPHOCYTES NFR BLD AUTO: 8.5 % (ref 27–41)
LYMPHOCYTES NFR BLD MANUAL: 1 % (ref 27–41)
LYMPHOCYTES NFR BLD MANUAL: 11 % (ref 27–41)
LYMPHOCYTES NFR BLD MANUAL: 12 % (ref 27–41)
LYMPHOCYTES NFR BLD MANUAL: 14 % (ref 27–41)
LYMPHOCYTES NFR BLD MANUAL: 16 % (ref 27–41)
LYMPHOCYTES NFR BLD MANUAL: 18 % (ref 27–41)
LYMPHOCYTES NFR BLD MANUAL: 2 % (ref 27–41)
LYMPHOCYTES NFR BLD MANUAL: 3 % (ref 27–41)
LYMPHOCYTES NFR BLD MANUAL: 4 % (ref 27–41)
LYMPHOCYTES NFR BLD MANUAL: 5 % (ref 27–41)
LYMPHOCYTES NFR BLD MANUAL: 9 % (ref 27–41)
MAGNESIUM SERPL-MCNC: 2.4 MG/DL (ref 1.7–2.3)
MAGNESIUM SERPL-MCNC: 2.8 MG/DL (ref 1.7–2.3)
MAGNESIUM SERPL-MCNC: 3.1 MG/DL (ref 1.7–2.3)
MAGNESIUM SERPL-MCNC: 3.2 MG/DL (ref 1.7–2.3)
MCH RBC QN AUTO: 28.8 PG (ref 27–31)
MCH RBC QN AUTO: 29 PG (ref 27–31)
MCH RBC QN AUTO: 29 PG (ref 27–31)
MCH RBC QN AUTO: 29.1 PG (ref 27–31)
MCH RBC QN AUTO: 29.3 PG (ref 27–31)
MCH RBC QN AUTO: 29.4 PG (ref 27–31)
MCH RBC QN AUTO: 29.4 PG (ref 27–31)
MCH RBC QN AUTO: 29.5 PG (ref 27–31)
MCH RBC QN AUTO: 29.5 PG (ref 27–31)
MCH RBC QN AUTO: 29.6 PG (ref 27–31)
MCH RBC QN AUTO: 29.7 PG (ref 27–31)
MCH RBC QN AUTO: 29.8 PG (ref 27–31)
MCH RBC QN AUTO: 30.2 PG (ref 27–31)
MCHC RBC AUTO-ENTMCNC: 32.5 G/DL (ref 32–36)
MCHC RBC AUTO-ENTMCNC: 32.6 G/DL (ref 32–36)
MCHC RBC AUTO-ENTMCNC: 32.6 G/DL (ref 32–36)
MCHC RBC AUTO-ENTMCNC: 33.5 G/DL (ref 32–36)
MCHC RBC AUTO-ENTMCNC: 33.6 G/DL (ref 32–36)
MCHC RBC AUTO-ENTMCNC: 33.8 G/DL (ref 32–36)
MCHC RBC AUTO-ENTMCNC: 34.4 G/DL (ref 32–36)
MCHC RBC AUTO-ENTMCNC: 34.7 G/DL (ref 32–36)
MCHC RBC AUTO-ENTMCNC: 34.8 G/DL (ref 32–36)
MCHC RBC AUTO-ENTMCNC: 34.9 G/DL (ref 32–36)
MCHC RBC AUTO-ENTMCNC: 35 G/DL (ref 32–36)
MCHC RBC AUTO-ENTMCNC: 35.2 G/DL (ref 32–36)
MCHC RBC AUTO-ENTMCNC: 35.3 G/DL (ref 32–36)
MCHC RBC AUTO-ENTMCNC: 36 G/DL (ref 32–36)
MCV RBC AUTO: 80.8 FL (ref 80–96)
MCV RBC AUTO: 83 FL (ref 80–96)
MCV RBC AUTO: 84.1 FL (ref 80–96)
MCV RBC AUTO: 84.2 FL (ref 80–96)
MCV RBC AUTO: 84.9 FL (ref 80–96)
MCV RBC AUTO: 85.2 FL (ref 80–96)
MCV RBC AUTO: 85.4 FL (ref 80–96)
MCV RBC AUTO: 85.5 FL (ref 80–96)
MCV RBC AUTO: 85.6 FL (ref 80–96)
MCV RBC AUTO: 85.6 FL (ref 80–96)
MCV RBC AUTO: 86.5 FL (ref 80–96)
MCV RBC AUTO: 86.6 FL (ref 80–96)
MCV RBC AUTO: 87.8 FL (ref 80–96)
MCV RBC AUTO: 90.6 FL (ref 80–96)
MCV RBC AUTO: 90.8 FL (ref 80–96)
MCV RBC AUTO: 90.9 FL (ref 80–96)
METAMYELOCYTES NFR BLD MANUAL: 1 %
METAMYELOCYTES NFR BLD MANUAL: 2 %
METAMYELOCYTES NFR BLD MANUAL: 6 %
MICROORGANISM SPEC CULT: ABNORMAL
MICROORGANISM SPEC CULT: ABNORMAL
MIXED CELL CASTS #/AREA UR COMP ASSIST: ABNORMAL /LPF
MONOCYTES # BLD AUTO: 0.26 K/UL (ref 0–0.8)
MONOCYTES # BLD AUTO: 0.35 K/UL (ref 0–0.8)
MONOCYTES # BLD AUTO: 0.58 K/UL (ref 0–0.8)
MONOCYTES # BLD AUTO: 0.59 K/UL (ref 0–0.8)
MONOCYTES # BLD AUTO: 0.79 K/UL (ref 0–0.8)
MONOCYTES # BLD AUTO: 0.84 K/UL (ref 0–0.8)
MONOCYTES # BLD AUTO: 0.92 K/UL (ref 0–0.8)
MONOCYTES # BLD AUTO: 1.07 K/UL (ref 0–0.8)
MONOCYTES # BLD AUTO: 1.1 K/UL (ref 0–0.8)
MONOCYTES # BLD AUTO: 1.35 K/UL (ref 0–0.8)
MONOCYTES # BLD AUTO: 1.36 K/UL (ref 0–0.8)
MONOCYTES # BLD AUTO: 1.8 K/UL (ref 0–0.8)
MONOCYTES # BLD AUTO: 1.96 K/UL (ref 0–0.8)
MONOCYTES # BLD AUTO: 1.99 K/UL (ref 0–0.8)
MONOCYTES # BLD AUTO: 2.42 K/UL (ref 0–0.8)
MONOCYTES # BLD AUTO: 3 K/UL (ref 0–0.8)
MONOCYTES NFR BLD AUTO: 10.9 % (ref 2–6)
MONOCYTES NFR BLD AUTO: 11.1 % (ref 2–6)
MONOCYTES NFR BLD AUTO: 12.6 % (ref 2–6)
MONOCYTES NFR BLD AUTO: 3.2 % (ref 2–6)
MONOCYTES NFR BLD AUTO: 4.5 % (ref 2–6)
MONOCYTES NFR BLD AUTO: 5.1 % (ref 2–6)
MONOCYTES NFR BLD AUTO: 5.6 % (ref 2–6)
MONOCYTES NFR BLD AUTO: 6.5 % (ref 2–6)
MONOCYTES NFR BLD AUTO: 6.9 % (ref 2–6)
MONOCYTES NFR BLD AUTO: 7 % (ref 2–6)
MONOCYTES NFR BLD AUTO: 7.1 % (ref 2–6)
MONOCYTES NFR BLD AUTO: 8 % (ref 2–6)
MONOCYTES NFR BLD AUTO: 8.2 % (ref 2–6)
MONOCYTES NFR BLD AUTO: 9 % (ref 2–6)
MONOCYTES NFR BLD AUTO: 9 % (ref 2–6)
MONOCYTES NFR BLD AUTO: 9.1 % (ref 2–6)
MONOCYTES NFR BLD MANUAL: 11 % (ref 2–6)
MONOCYTES NFR BLD MANUAL: 11 % (ref 2–6)
MONOCYTES NFR BLD MANUAL: 12 % (ref 2–6)
MONOCYTES NFR BLD MANUAL: 3 % (ref 2–6)
MONOCYTES NFR BLD MANUAL: 3 % (ref 2–6)
MONOCYTES NFR BLD MANUAL: 5 % (ref 2–6)
MONOCYTES NFR BLD MANUAL: 5 % (ref 2–6)
MONOCYTES NFR BLD MANUAL: 6 % (ref 2–6)
MONOCYTES NFR BLD MANUAL: 6 % (ref 2–6)
MONOCYTES NFR BLD MANUAL: 7 % (ref 2–6)
MONOCYTES NFR BLD MANUAL: 8 % (ref 2–6)
MPC BLD CALC-MCNC: 10 FL (ref 9.4–12.4)
MPC BLD CALC-MCNC: 10 FL (ref 9.4–12.4)
MPC BLD CALC-MCNC: 10.2 FL (ref 9.4–12.4)
MPC BLD CALC-MCNC: 10.3 FL (ref 9.4–12.4)
MPC BLD CALC-MCNC: 10.4 FL (ref 9.4–12.4)
MPC BLD CALC-MCNC: 10.7 FL (ref 9.4–12.4)
MPC BLD CALC-MCNC: 11 FL (ref 9.4–12.4)
MPC BLD CALC-MCNC: 11 FL (ref 9.4–12.4)
MPC BLD CALC-MCNC: 11.1 FL (ref 9.4–12.4)
MPC BLD CALC-MCNC: 11.1 FL (ref 9.4–12.4)
MPC BLD CALC-MCNC: 11.2 FL (ref 9.4–12.4)
MPC BLD CALC-MCNC: 11.2 FL (ref 9.4–12.4)
MPC BLD CALC-MCNC: 11.4 FL (ref 9.4–12.4)
MPC BLD CALC-MCNC: 11.4 FL (ref 9.4–12.4)
MPC BLD CALC-MCNC: 11.6 FL (ref 9.4–12.4)
MPC BLD CALC-MCNC: 9.7 FL (ref 9.4–12.4)
MV PEAK E VEL: 0.65 M/S
MYELOCYTES NFR BLD MANUAL: 1 %
MYELOCYTES NFR BLD MANUAL: 1 %
MYELOCYTES NFR BLD MANUAL: 2 %
NEUTROPHILS # BLD AUTO: 11.14 K/UL (ref 1.8–7.7)
NEUTROPHILS # BLD AUTO: 12.22 K/UL (ref 1.8–7.7)
NEUTROPHILS # BLD AUTO: 12.65 K/UL (ref 1.8–7.7)
NEUTROPHILS # BLD AUTO: 13.96 K/UL (ref 1.8–7.7)
NEUTROPHILS # BLD AUTO: 14.52 K/UL (ref 1.8–7.7)
NEUTROPHILS # BLD AUTO: 14.88 K/UL (ref 1.8–7.7)
NEUTROPHILS # BLD AUTO: 15.86 K/UL (ref 1.8–7.7)
NEUTROPHILS # BLD AUTO: 18.25 K/UL (ref 1.8–7.7)
NEUTROPHILS # BLD AUTO: 18.52 K/UL (ref 1.8–7.7)
NEUTROPHILS # BLD AUTO: 2.17 K/UL (ref 1.8–7.7)
NEUTROPHILS # BLD AUTO: 28.02 K/UL (ref 1.8–7.7)
NEUTROPHILS # BLD AUTO: 6.09 K/UL (ref 1.8–7.7)
NEUTROPHILS # BLD AUTO: 6.27 K/UL (ref 1.8–7.7)
NEUTROPHILS # BLD AUTO: 6.72 K/UL (ref 1.8–7.7)
NEUTROPHILS # BLD AUTO: 9.43 K/UL (ref 1.8–7.7)
NEUTROPHILS # BLD AUTO: 9.62 K/UL (ref 1.8–7.7)
NEUTROPHILS NFR BLD AUTO: 46.4 % (ref 53–65)
NEUTROPHILS NFR BLD AUTO: 72.4 % (ref 53–65)
NEUTROPHILS NFR BLD AUTO: 72.8 % (ref 53–65)
NEUTROPHILS NFR BLD AUTO: 74.2 % (ref 53–65)
NEUTROPHILS NFR BLD AUTO: 74.4 % (ref 53–65)
NEUTROPHILS NFR BLD AUTO: 74.5 % (ref 53–65)
NEUTROPHILS NFR BLD AUTO: 74.9 % (ref 53–65)
NEUTROPHILS NFR BLD AUTO: 76.2 % (ref 53–65)
NEUTROPHILS NFR BLD AUTO: 79.4 % (ref 53–65)
NEUTROPHILS NFR BLD AUTO: 80.2 % (ref 53–65)
NEUTROPHILS NFR BLD AUTO: 80.8 % (ref 53–65)
NEUTROPHILS NFR BLD AUTO: 81.8 % (ref 53–65)
NEUTROPHILS NFR BLD AUTO: 83.9 % (ref 53–65)
NEUTROPHILS NFR BLD AUTO: 83.9 % (ref 53–65)
NEUTROPHILS NFR BLD AUTO: 85 % (ref 53–65)
NEUTROPHILS NFR BLD AUTO: 85.7 % (ref 53–65)
NEUTS BAND NFR BLD MANUAL: 1 % (ref 1–5)
NEUTS BAND NFR BLD MANUAL: 10 % (ref 1–5)
NEUTS BAND NFR BLD MANUAL: 13 % (ref 1–5)
NEUTS BAND NFR BLD MANUAL: 15 % (ref 1–5)
NEUTS BAND NFR BLD MANUAL: 17 % (ref 1–5)
NEUTS BAND NFR BLD MANUAL: 22 % (ref 1–5)
NEUTS BAND NFR BLD MANUAL: 24 % (ref 1–5)
NEUTS BAND NFR BLD MANUAL: 27 % (ref 1–5)
NEUTS BAND NFR BLD MANUAL: 28 % (ref 1–5)
NEUTS BAND NFR BLD MANUAL: 28 % (ref 1–5)
NEUTS BAND NFR BLD MANUAL: 32 % (ref 1–5)
NEUTS BAND NFR BLD MANUAL: 4 % (ref 1–5)
NEUTS BAND NFR BLD MANUAL: 8 % (ref 1–5)
NEUTS SEG NFR BLD MANUAL: 49 % (ref 50–62)
NEUTS SEG NFR BLD MANUAL: 51 % (ref 50–62)
NEUTS SEG NFR BLD MANUAL: 54 % (ref 50–62)
NEUTS SEG NFR BLD MANUAL: 54 % (ref 50–62)
NEUTS SEG NFR BLD MANUAL: 57 % (ref 50–62)
NEUTS SEG NFR BLD MANUAL: 60 % (ref 50–62)
NEUTS SEG NFR BLD MANUAL: 65 % (ref 50–62)
NEUTS SEG NFR BLD MANUAL: 69 % (ref 50–62)
NEUTS SEG NFR BLD MANUAL: 71 % (ref 50–62)
NEUTS SEG NFR BLD MANUAL: 74 % (ref 50–62)
NEUTS SEG NFR BLD MANUAL: 78 % (ref 50–62)
NEUTS SEG NFR BLD MANUAL: 81 % (ref 50–62)
NEUTS SEG NFR BLD MANUAL: 85 % (ref 50–62)
NITRITE UR QL STRIP: NEGATIVE
NONHDLC SERPL-MCNC: 135 MG/DL
NRBC # BLD AUTO: 0 X10E3/UL
NRBC # BLD AUTO: 0.02 X10E3/UL
NRBC # BLD AUTO: 0.02 X10E3/UL
NRBC BLD MANUAL-RTO: 1 /100 WBC
NRBC, AUTO (.00): 0 %
NRBC, AUTO (.00): 0.1 %
NRBC, AUTO (.00): 0.1 %
PCO2 BLDA: 28 MMHG (ref 35–48)
PCO2 BLDA: 30 MMHG (ref 35–48)
PCO2 BLDA: 35 MMHG (ref 35–48)
PCO2 BLDA: 36 MMHG (ref 35–48)
PCO2 BLDA: 38 MMHG (ref 35–48)
PCO2 BLDA: 41 MMHG (ref 35–48)
PH SMN: 7.22 [PH] (ref 7.35–7.45)
PH SMN: 7.22 [PH] (ref 7.35–7.45)
PH SMN: 7.29 [PH] (ref 7.35–7.45)
PH SMN: 7.36 [PH] (ref 7.35–7.45)
PH SMN: 7.38 [PH] (ref 7.35–7.45)
PH SMN: 7.38 [PH] (ref 7.35–7.45)
PH SMN: 7.46 [PH] (ref 7.35–7.45)
PH SMN: 7.51 [PH] (ref 7.35–7.45)
PH UR STRIP: 6 PH UNITS
PHOSPHATE SERPL-MCNC: 15.3 MG/DL (ref 2.5–4.5)
PHOSPHATE SERPL-MCNC: 5 MG/DL (ref 2.5–4.5)
PISA TR MAX VEL: 2.2 M/S
PLATELET # BLD AUTO: 136 K/UL (ref 150–400)
PLATELET # BLD AUTO: 152 K/UL (ref 150–400)
PLATELET # BLD AUTO: 184 K/UL (ref 150–400)
PLATELET # BLD AUTO: 185 K/UL (ref 150–400)
PLATELET # BLD AUTO: 199 K/UL (ref 150–400)
PLATELET # BLD AUTO: 208 K/UL (ref 150–400)
PLATELET # BLD AUTO: 213 K/UL (ref 150–400)
PLATELET # BLD AUTO: 232 K/UL (ref 150–400)
PLATELET # BLD AUTO: 259 K/UL (ref 150–400)
PLATELET # BLD AUTO: 309 K/UL (ref 150–400)
PLATELET # BLD AUTO: 310 K/UL (ref 150–400)
PLATELET # BLD AUTO: 322 K/UL (ref 150–400)
PLATELET # BLD AUTO: 353 K/UL (ref 150–400)
PLATELET # BLD AUTO: 353 K/UL (ref 150–400)
PLATELET # BLD AUTO: 359 K/UL (ref 150–400)
PLATELET # BLD AUTO: 368 K/UL (ref 150–400)
PLATELET MORPHOLOGY: ABNORMAL
PLATELET MORPHOLOGY: NORMAL
PO2 BLDA: 113 MMHG (ref 83–108)
PO2 BLDA: 130 MMHG (ref 83–108)
PO2 BLDA: 150 MMHG (ref 83–108)
PO2 BLDA: 162 MMHG (ref 83–108)
PO2 BLDA: 515 MMHG (ref 83–108)
PO2 BLDA: 75 MMHG (ref 83–108)
PO2 BLDA: 80 MMHG (ref 83–108)
PO2 BLDA: 85 MMHG (ref 83–108)
POC BASE EXCESS: -12.1 MMOL/L (ref -2–3)
POC BASE EXCESS: -14.1 MMOL/L (ref -2–3)
POC BASE EXCESS: -2.3 MMOL/L (ref -2–3)
POC BASE EXCESS: -2.8 MMOL/L (ref -2–3)
POC BASE EXCESS: -5 MMOL/L (ref -2–3)
POC BASE EXCESS: -6.5 MMOL/L (ref -2–3)
POC BASE EXCESS: -7.2 MMOL/L (ref -2–3)
POC BASE EXCESS: 0.2 MMOL/L (ref -2–3)
POC SATURATED O2: 100 % (ref 95–98)
POC SATURATED O2: 91 % (ref 95–98)
POC SATURATED O2: 94 % (ref 95–98)
POC SATURATED O2: 97 % (ref 95–98)
POC SATURATED O2: 98 % (ref 95–98)
POC SATURATED O2: 98 % (ref 95–98)
POC SATURATED O2: 99 % (ref 95–98)
POC SATURATED O2: 99 % (ref 95–98)
POLYCHROMASIA BLD QL SMEAR: ABNORMAL
POTASSIUM SERPL-SCNC: 3.5 MMOL/L (ref 3.5–5.1)
POTASSIUM SERPL-SCNC: 3.7 MMOL/L (ref 3.5–5.1)
POTASSIUM SERPL-SCNC: 3.8 MMOL/L (ref 3.5–5.1)
POTASSIUM SERPL-SCNC: 3.9 MMOL/L (ref 3.5–5.1)
POTASSIUM SERPL-SCNC: 4.3 MMOL/L (ref 3.5–5.1)
POTASSIUM SERPL-SCNC: 4.3 MMOL/L (ref 3.5–5.1)
POTASSIUM SERPL-SCNC: 4.5 MMOL/L (ref 3.5–5.1)
POTASSIUM SERPL-SCNC: 4.7 MMOL/L (ref 3.5–5.1)
POTASSIUM SERPL-SCNC: 5.1 MMOL/L (ref 3.5–5.1)
POTASSIUM SERPL-SCNC: 5.2 MMOL/L (ref 3.5–5.1)
POTASSIUM SERPL-SCNC: 5.5 MMOL/L (ref 3.5–5.1)
POTASSIUM SERPL-SCNC: 5.6 MMOL/L (ref 3.5–5.1)
POTASSIUM SERPL-SCNC: 6.1 MMOL/L (ref 3.5–5.1)
POTASSIUM SERPL-SCNC: 6.2 MMOL/L (ref 3.5–5.1)
POTASSIUM SERPL-SCNC: 6.3 MMOL/L (ref 3.5–5.1)
POTASSIUM SERPL-SCNC: 6.4 MMOL/L (ref 3.5–5.1)
POTASSIUM SERPL-SCNC: 7.1 MMOL/L (ref 3.5–5.1)
POTASSIUM SERPL-SCNC: 7.3 MMOL/L (ref 3.5–5.1)
POTASSIUM UR-SCNC: 22 MMOL/L (ref 25–125)
PROMYELOCYTES NFR BLD MANUAL: 1 %
PROT SERPL-MCNC: 5.9 G/DL (ref 6.4–8.2)
PROT SERPL-MCNC: 6.5 G/DL (ref 6.4–8.2)
PROT SERPL-MCNC: 7.2 G/DL (ref 6.4–8.2)
PROT SERPL-MCNC: 7.3 G/DL (ref 6.4–8.2)
PROT SERPL-MCNC: 7.5 G/DL (ref 6.4–8.2)
PROT SERPL-MCNC: 8.1 G/DL (ref 6.4–8.2)
PROT UR QL STRIP: 100
PROTHROMBIN TIME: 16.6 SECONDS (ref 11.7–14.7)
RA MAJOR: 3.6 CM
RA PRESSURE: 3 MMHG
RBC # BLD AUTO: 1.87 M/UL (ref 4.6–6.2)
RBC # BLD AUTO: 2.15 M/UL (ref 4.6–6.2)
RBC # BLD AUTO: 2.29 M/UL (ref 4.6–6.2)
RBC # BLD AUTO: 2.3 M/UL (ref 4.6–6.2)
RBC # BLD AUTO: 2.54 M/UL (ref 4.6–6.2)
RBC # BLD AUTO: 2.6 M/UL (ref 4.6–6.2)
RBC # BLD AUTO: 2.71 M/UL (ref 4.6–6.2)
RBC # BLD AUTO: 2.74 M/UL (ref 4.6–6.2)
RBC # BLD AUTO: 2.9 M/UL (ref 4.6–6.2)
RBC # BLD AUTO: 2.97 M/UL (ref 4.6–6.2)
RBC # BLD AUTO: 3.03 M/UL (ref 4.6–6.2)
RBC # BLD AUTO: 3.13 M/UL (ref 4.6–6.2)
RBC # BLD AUTO: 3.65 M/UL (ref 4.6–6.2)
RBC # BLD AUTO: 3.92 M/UL (ref 4.6–6.2)
RBC # BLD AUTO: 4.26 M/UL (ref 4.6–6.2)
RBC # BLD AUTO: 4.34 M/UL (ref 4.6–6.2)
RBC # UR STRIP: ABNORMAL /UL
RBC #/AREA URNS HPF: ABNORMAL /HPF
RBC MORPH BLD: NORMAL
RBC MORPH BLD: NORMAL
REACTIVE LYMPHOCYTES: ABNORMAL
RH BLD: NORMAL
RIGHT VENTRICULAR END-DIASTOLIC DIMENSION: 4 CM
SARS-COV+SARS-COV-2 AG RESP QL IA.RAPID: NEGATIVE
SODIUM SERPL-SCNC: 122 MMOL/L (ref 136–145)
SODIUM SERPL-SCNC: 122 MMOL/L (ref 136–145)
SODIUM SERPL-SCNC: 129 MMOL/L (ref 136–145)
SODIUM SERPL-SCNC: 129 MMOL/L (ref 136–145)
SODIUM SERPL-SCNC: 131 MMOL/L (ref 136–145)
SODIUM SERPL-SCNC: 132 MMOL/L (ref 136–145)
SODIUM SERPL-SCNC: 134 MMOL/L (ref 136–145)
SODIUM SERPL-SCNC: 137 MMOL/L (ref 136–145)
SODIUM SERPL-SCNC: 140 MMOL/L (ref 136–145)
SODIUM SERPL-SCNC: 140 MMOL/L (ref 136–145)
SODIUM SERPL-SCNC: 141 MMOL/L (ref 136–145)
SODIUM SERPL-SCNC: 143 MMOL/L (ref 136–145)
SODIUM SERPL-SCNC: 144 MMOL/L (ref 136–145)
SODIUM SERPL-SCNC: 146 MMOL/L (ref 136–145)
SODIUM SERPL-SCNC: 151 MMOL/L (ref 136–145)
SODIUM UR-SCNC: 95 MMOL/L (ref 40–220)
SP GR UR STRIP: >=1.03
SQUAMOUS #/AREA URNS LPF: ABNORMAL /LPF
T3RU NFR SERPL: NORMAL %
TARGETS BLD QL SMEAR: ABNORMAL
TR MAX PG: 19 MMHG
TRICUSPID ANNULAR PLANE SYSTOLIC EXCURSION: 2 CM
TRIGL SERPL-MCNC: 74 MG/DL (ref 35–150)
TV REST PULMONARY ARTERY PRESSURE: 22 MMHG
UA COMPLETE W REFLEX CULTURE PNL UR: NO GROWTH
UNIT NUMBER: NORMAL
UROBILINOGEN UR STRIP-ACNC: 0.2 MG/DL
VLDLC SERPL-MCNC: 15 MG/DL
WBC # BLD AUTO: 11.09 K/UL (ref 4.5–11)
WBC # BLD AUTO: 11.23 K/UL (ref 4.5–11)
WBC # BLD AUTO: 15.38 K/UL (ref 4.5–11)
WBC # BLD AUTO: 16.32 K/UL (ref 4.5–11)
WBC # BLD AUTO: 17.01 K/UL (ref 4.5–11)
WBC # BLD AUTO: 17.07 K/UL (ref 4.5–11)
WBC # BLD AUTO: 17.98 K/UL (ref 4.5–11)
WBC # BLD AUTO: 19.97 K/UL (ref 4.5–11)
WBC # BLD AUTO: 21.75 K/UL (ref 4.5–11)
WBC # BLD AUTO: 21.8 K/UL (ref 4.5–11)
WBC # BLD AUTO: 24.28 K/UL (ref 4.5–11)
WBC # BLD AUTO: 33.39 K/UL (ref 4.5–11)
WBC # BLD AUTO: 4.68 K/UL (ref 4.5–11)
WBC # BLD AUTO: 7.81 K/UL (ref 4.5–11)
WBC # BLD AUTO: 8.2 K/UL (ref 4.5–11)
WBC # BLD AUTO: 8.45 K/UL (ref 4.5–11)
WBC #/AREA URNS HPF: ABNORMAL /HPF

## 2021-01-01 PROCEDURE — 94003 VENT MGMT INPAT SUBQ DAY: CPT

## 2021-01-01 PROCEDURE — 25000003 PHARM REV CODE 250: Performed by: INTERNAL MEDICINE

## 2021-01-01 PROCEDURE — 82330 ASSAY OF CALCIUM: CPT | Performed by: INTERNAL MEDICINE

## 2021-01-01 PROCEDURE — 82962 GLUCOSE BLOOD TEST: CPT

## 2021-01-01 PROCEDURE — 37000008 HC ANESTHESIA 1ST 15 MINUTES: Performed by: SURGERY

## 2021-01-01 PROCEDURE — 99900026 HC AIRWAY MAINTENANCE (STAT)

## 2021-01-01 PROCEDURE — 36430 TRANSFUSION BLD/BLD COMPNT: CPT

## 2021-01-01 PROCEDURE — 99222 1ST HOSP IP/OBS MODERATE 55: CPT | Mod: AI,,, | Performed by: INTERNAL MEDICINE

## 2021-01-01 PROCEDURE — 99233 SBSQ HOSP IP/OBS HIGH 50: CPT | Mod: ,,, | Performed by: INTERNAL MEDICINE

## 2021-01-01 PROCEDURE — 36415 COLL VENOUS BLD VENIPUNCTURE: CPT | Performed by: ANESTHESIOLOGY

## 2021-01-01 PROCEDURE — 36415 COLL VENOUS BLD VENIPUNCTURE: CPT | Performed by: INTERNAL MEDICINE

## 2021-01-01 PROCEDURE — 80061 LIPID PANEL: CPT | Mod: QW,,, | Performed by: CLINICAL MEDICAL LABORATORY

## 2021-01-01 PROCEDURE — 63600175 PHARM REV CODE 636 W HCPCS: Performed by: INTERNAL MEDICINE

## 2021-01-01 PROCEDURE — 99900035 HC TECH TIME PER 15 MIN (STAT)

## 2021-01-01 PROCEDURE — 93010 EKG 12-LEAD: ICD-10-PCS | Mod: ,,, | Performed by: INTERNAL MEDICINE

## 2021-01-01 PROCEDURE — 85025 COMPLETE CBC W/AUTO DIFF WBC: CPT | Performed by: INTERNAL MEDICINE

## 2021-01-01 PROCEDURE — 80053 COMPREHEN METABOLIC PANEL: CPT | Performed by: NURSE PRACTITIONER

## 2021-01-01 PROCEDURE — 80048 BASIC METABOLIC PNL TOTAL CA: CPT | Performed by: INTERNAL MEDICINE

## 2021-01-01 PROCEDURE — 82803 BLOOD GASES ANY COMBINATION: CPT

## 2021-01-01 PROCEDURE — C9113 INJ PANTOPRAZOLE SODIUM, VIA: HCPCS | Performed by: SURGERY

## 2021-01-01 PROCEDURE — 27000260 *HC AIRWAY ORAL: Performed by: ANESTHESIOLOGY

## 2021-01-01 PROCEDURE — 63600175 PHARM REV CODE 636 W HCPCS: Performed by: SURGERY

## 2021-01-01 PROCEDURE — 87075 CULTR BACTERIA EXCEPT BLOOD: CPT | Performed by: SURGERY

## 2021-01-01 PROCEDURE — 80100014 HC HEMODIALYSIS 1:1

## 2021-01-01 PROCEDURE — 27201423 OPTIME MED/SURG SUP & DEVICES STERILE SUPPLY: Performed by: SURGERY

## 2021-01-01 PROCEDURE — 80053 COMPREHEN METABOLIC PANEL: CPT | Performed by: INTERNAL MEDICINE

## 2021-01-01 PROCEDURE — 84133 ASSAY OF URINE POTASSIUM: CPT | Performed by: NURSE PRACTITIONER

## 2021-01-01 PROCEDURE — 99291 CRITICAL CARE FIRST HOUR: CPT | Mod: ,,, | Performed by: INTERNAL MEDICINE

## 2021-01-01 PROCEDURE — 63600175 PHARM REV CODE 636 W HCPCS: Performed by: NURSE ANESTHETIST, CERTIFIED REGISTERED

## 2021-01-01 PROCEDURE — 63600175 PHARM REV CODE 636 W HCPCS: Performed by: NURSE PRACTITIONER

## 2021-01-01 PROCEDURE — 36415 COLL VENOUS BLD VENIPUNCTURE: CPT | Performed by: SURGERY

## 2021-01-01 PROCEDURE — 36415 COLL VENOUS BLD VENIPUNCTURE: CPT | Performed by: NURSE PRACTITIONER

## 2021-01-01 PROCEDURE — 63600175 PHARM REV CODE 636 W HCPCS

## 2021-01-01 PROCEDURE — 49020 DRAINAGE ABDOM ABSCESS OPEN: CPT | Mod: 52,,, | Performed by: SURGERY

## 2021-01-01 PROCEDURE — 83605 ASSAY OF LACTIC ACID: CPT | Performed by: INTERNAL MEDICINE

## 2021-01-01 PROCEDURE — 99291 PR CRITICAL CARE, E/M 30-74 MINUTES: ICD-10-PCS | Mod: ,,, | Performed by: INTERNAL MEDICINE

## 2021-01-01 PROCEDURE — 27000221 HC OXYGEN, UP TO 24 HOURS

## 2021-01-01 PROCEDURE — 25000003 PHARM REV CODE 250: Performed by: SURGERY

## 2021-01-01 PROCEDURE — 94761 N-INVAS EAR/PLS OXIMETRY MLT: CPT

## 2021-01-01 PROCEDURE — 20000000 HC ICU ROOM

## 2021-01-01 PROCEDURE — 27000689 HC BLADE LARYNGOSCOPE ANY SIZE: Performed by: NURSE ANESTHETIST, CERTIFIED REGISTERED

## 2021-01-01 PROCEDURE — 99900025 HC BRONCHOSCOPY-ASST (STAT)

## 2021-01-01 PROCEDURE — 86923 COMPATIBILITY TEST ELECTRIC: CPT | Performed by: SURGERY

## 2021-01-01 PROCEDURE — 85025 COMPLETE CBC W/AUTO DIFF WBC: CPT | Performed by: NURSE PRACTITIONER

## 2021-01-01 PROCEDURE — P9016 RBC LEUKOCYTES REDUCED: HCPCS

## 2021-01-01 PROCEDURE — 87070 CULTURE OTHR SPECIMN AEROBIC: CPT | Performed by: SURGERY

## 2021-01-01 PROCEDURE — 87086 URINE CULTURE/COLONY COUNT: CPT | Performed by: NURSE PRACTITIONER

## 2021-01-01 PROCEDURE — 83735 ASSAY OF MAGNESIUM: CPT | Performed by: INTERNAL MEDICINE

## 2021-01-01 PROCEDURE — 27000655: Performed by: ANESTHESIOLOGY

## 2021-01-01 PROCEDURE — P9016 RBC LEUKOCYTES REDUCED: HCPCS | Performed by: SURGERY

## 2021-01-01 PROCEDURE — 25000003 PHARM REV CODE 250: Performed by: NURSE PRACTITIONER

## 2021-01-01 PROCEDURE — 96375 TX/PRO/DX INJ NEW DRUG ADDON: CPT

## 2021-01-01 PROCEDURE — D9220A PRA ANESTHESIA: ICD-10-PCS | Mod: CRNA,,, | Performed by: NURSE ANESTHETIST, CERTIFIED REGISTERED

## 2021-01-01 PROCEDURE — 99223 1ST HOSP IP/OBS HIGH 75: CPT | Mod: ,,, | Performed by: INTERNAL MEDICINE

## 2021-01-01 PROCEDURE — 99223 1ST HOSP IP/OBS HIGH 75: CPT | Mod: 25,57,, | Performed by: SURGERY

## 2021-01-01 PROCEDURE — 99284 EMERGENCY DEPT VISIT MOD MDM: CPT | Mod: ,,, | Performed by: NURSE PRACTITIONER

## 2021-01-01 PROCEDURE — 25000003 PHARM REV CODE 250: Performed by: NURSE ANESTHETIST, CERTIFIED REGISTERED

## 2021-01-01 PROCEDURE — 96361 HYDRATE IV INFUSION ADD-ON: CPT

## 2021-01-01 PROCEDURE — 83605 ASSAY OF LACTIC ACID: CPT | Performed by: NURSE PRACTITIONER

## 2021-01-01 PROCEDURE — 84100 ASSAY OF PHOSPHORUS: CPT | Performed by: INTERNAL MEDICINE

## 2021-01-01 PROCEDURE — 93005 ELECTROCARDIOGRAM TRACING: CPT

## 2021-01-01 PROCEDURE — 99233 PR SUBSEQUENT HOSPITAL CARE,LEVL III: ICD-10-PCS | Mod: ,,, | Performed by: INTERNAL MEDICINE

## 2021-01-01 PROCEDURE — 36556 PR INSERT NON-TUNNEL CV CATH 5+ YRS OLD: ICD-10-PCS | Mod: 79,RT,, | Performed by: SURGERY

## 2021-01-01 PROCEDURE — 31645 PR BRONCHOSCOPY,RX ASPIR PULM TREE: ICD-10-PCS | Mod: ,,, | Performed by: INTERNAL MEDICINE

## 2021-01-01 PROCEDURE — D9220A PRA ANESTHESIA: ICD-10-PCS | Mod: ANES,ICN,, | Performed by: ANESTHESIOLOGY

## 2021-01-01 PROCEDURE — D9220A PRA ANESTHESIA: Mod: ANES,ICN,, | Performed by: ANESTHESIOLOGY

## 2021-01-01 PROCEDURE — P9016 RBC LEUKOCYTES REDUCED: HCPCS | Performed by: INTERNAL MEDICINE

## 2021-01-01 PROCEDURE — 99223 PR INITIAL HOSPITAL CARE,LEVL III: ICD-10-PCS | Mod: 25,57,, | Performed by: SURGERY

## 2021-01-01 PROCEDURE — 97605 PR NEG PRESS WOUND THERAPY (NPWT) W/NON-DISPOSABLE WOUND VAC DEVICE (DME), <=50 CM: ICD-10-PCS | Mod: ,,, | Performed by: SURGERY

## 2021-01-01 PROCEDURE — C9399 UNCLASSIFIED DRUGS OR BIOLOG: HCPCS | Performed by: NURSE PRACTITIONER

## 2021-01-01 PROCEDURE — 82330 ASSAY OF CALCIUM: CPT | Performed by: NURSE PRACTITIONER

## 2021-01-01 PROCEDURE — 85014 HEMATOCRIT: CPT | Performed by: INTERNAL MEDICINE

## 2021-01-01 PROCEDURE — 49020 PR DRAIN ABD ABSCESS OPEN: ICD-10-PCS | Mod: 52,,, | Performed by: SURGERY

## 2021-01-01 PROCEDURE — 36600 WITHDRAWAL OF ARTERIAL BLOOD: CPT

## 2021-01-01 PROCEDURE — D9220A PRA ANESTHESIA: Mod: CRNA,,, | Performed by: NURSE ANESTHETIST, CERTIFIED REGISTERED

## 2021-01-01 PROCEDURE — 49002 PR REOPEN RECENT ABD EXPLORATORY: ICD-10-PCS | Mod: 58,52,, | Performed by: SURGERY

## 2021-01-01 PROCEDURE — 80053 COMPREHEN METABOLIC PANEL: CPT | Mod: QW,,, | Performed by: CLINICAL MEDICAL LABORATORY

## 2021-01-01 PROCEDURE — 99214 OFFICE O/P EST MOD 30 MIN: CPT | Mod: ,,, | Performed by: FAMILY MEDICINE

## 2021-01-01 PROCEDURE — 63600175 PHARM REV CODE 636 W HCPCS: Performed by: ANESTHESIOLOGY

## 2021-01-01 PROCEDURE — 82040 ASSAY OF SERUM ALBUMIN: CPT | Performed by: INTERNAL MEDICINE

## 2021-01-01 PROCEDURE — 36556 INSERT NON-TUNNEL CV CATH: CPT | Mod: 79,RT,, | Performed by: SURGERY

## 2021-01-01 PROCEDURE — 86901 BLOOD TYPING SEROLOGIC RH(D): CPT | Performed by: INTERNAL MEDICINE

## 2021-01-01 PROCEDURE — 27000716 HC OXISENSOR PROBE, ANY SIZE: Performed by: NURSE ANESTHETIST, CERTIFIED REGISTERED

## 2021-01-01 PROCEDURE — D9220A PRA ANESTHESIA: ICD-10-PCS | Mod: ,,, | Performed by: ANESTHESIOLOGY

## 2021-01-01 PROCEDURE — C9399 UNCLASSIFIED DRUGS OR BIOLOG: HCPCS | Performed by: INTERNAL MEDICINE

## 2021-01-01 PROCEDURE — 97605 NEG PRS WND THER DME<=50SQCM: CPT | Mod: ,,, | Performed by: SURGERY

## 2021-01-01 PROCEDURE — 88304 SURGICAL PATHOLOGY: ICD-10-PCS | Mod: 26,,, | Performed by: PATHOLOGY

## 2021-01-01 PROCEDURE — 27000716 HC OXISENSOR PROBE, ANY SIZE: Performed by: ANESTHESIOLOGY

## 2021-01-01 PROCEDURE — 96365 THER/PROPH/DIAG IV INF INIT: CPT

## 2021-01-01 PROCEDURE — 80061 LIPID PANEL: ICD-10-PCS | Mod: QW,,, | Performed by: CLINICAL MEDICAL LABORATORY

## 2021-01-01 PROCEDURE — C9113 INJ PANTOPRAZOLE SODIUM, VIA: HCPCS | Performed by: NURSE PRACTITIONER

## 2021-01-01 PROCEDURE — 25000003 PHARM REV CODE 250: Performed by: ANESTHESIOLOGY

## 2021-01-01 PROCEDURE — D9220A PRA ANESTHESIA: Mod: CRNA,,,

## 2021-01-01 PROCEDURE — 27200155 *HC SET PRIMARY NONVENTED

## 2021-01-01 PROCEDURE — 94760 N-INVAS EAR/PLS OXIMETRY 1: CPT

## 2021-01-01 PROCEDURE — 85025 CBC WITH DIFFERENTIAL: ICD-10-PCS | Mod: QW,,, | Performed by: CLINICAL MEDICAL LABORATORY

## 2021-01-01 PROCEDURE — 96372 THER/PROPH/DIAG INJ SC/IM: CPT

## 2021-01-01 PROCEDURE — 27000165 HC TUBE, ETT CUFFED: Performed by: ANESTHESIOLOGY

## 2021-01-01 PROCEDURE — 85025 COMPLETE CBC W/AUTO DIFF WBC: CPT | Performed by: SURGERY

## 2021-01-01 PROCEDURE — 85025 COMPLETE CBC W/AUTO DIFF WBC: CPT | Mod: QW,,, | Performed by: CLINICAL MEDICAL LABORATORY

## 2021-01-01 PROCEDURE — 11000008

## 2021-01-01 PROCEDURE — C9113 INJ PANTOPRAZOLE SODIUM, VIA: HCPCS | Performed by: INTERNAL MEDICINE

## 2021-01-01 PROCEDURE — 27000509 HC TUBE SALEM SUMP ANY SIZE: Performed by: ANESTHESIOLOGY

## 2021-01-01 PROCEDURE — 37000009 HC ANESTHESIA EA ADD 15 MINS: Performed by: SURGERY

## 2021-01-01 PROCEDURE — 86923 COMPATIBILITY TEST ELECTRIC: CPT | Performed by: INTERNAL MEDICINE

## 2021-01-01 PROCEDURE — 27000689 HC BLADE LARYNGOSCOPE ANY SIZE: Performed by: ANESTHESIOLOGY

## 2021-01-01 PROCEDURE — 80053 COMPREHENSIVE METABOLIC PANEL: ICD-10-PCS | Mod: QW,,, | Performed by: CLINICAL MEDICAL LABORATORY

## 2021-01-01 PROCEDURE — 99233 PR SUBSEQUENT HOSPITAL CARE,LEVL III: ICD-10-PCS | Mod: 25,,, | Performed by: INTERNAL MEDICINE

## 2021-01-01 PROCEDURE — 88304 TISSUE EXAM BY PATHOLOGIST: CPT | Mod: SUR | Performed by: SURGERY

## 2021-01-01 PROCEDURE — 25000242 PHARM REV CODE 250 ALT 637 W/ HCPCS: Performed by: ANESTHESIOLOGY

## 2021-01-01 PROCEDURE — 36000708 HC OR TIME LEV III 1ST 15 MIN: Performed by: SURGERY

## 2021-01-01 PROCEDURE — 80074 ACUTE HEPATITIS PANEL: CPT | Performed by: NURSE PRACTITIONER

## 2021-01-01 PROCEDURE — 44320 COLOSTOMY: CPT | Mod: 58,,, | Performed by: SURGERY

## 2021-01-01 PROCEDURE — 31645 BRNCHSC W/THER ASPIR 1ST: CPT | Mod: ,,, | Performed by: INTERNAL MEDICINE

## 2021-01-01 PROCEDURE — 36000709 HC OR TIME LEV III EA ADD 15 MIN: Performed by: SURGERY

## 2021-01-01 PROCEDURE — 80048 BASIC METABOLIC PNL TOTAL CA: CPT | Performed by: SURGERY

## 2021-01-01 PROCEDURE — 49020 DRAINAGE ABDOM ABSCESS OPEN: CPT | Mod: 80,,, | Performed by: SURGERY

## 2021-01-01 PROCEDURE — 86923 COMPATIBILITY TEST ELECTRIC: CPT | Mod: 91

## 2021-01-01 PROCEDURE — 99291 PR CRITICAL CARE, E/M 30-74 MINUTES: ICD-10-PCS | Mod: ,,, | Performed by: NURSE PRACTITIONER

## 2021-01-01 PROCEDURE — 44320 PR COLOSTOMY: ICD-10-PCS | Mod: 58,,, | Performed by: SURGERY

## 2021-01-01 PROCEDURE — 85610 PROTHROMBIN TIME: CPT | Performed by: INTERNAL MEDICINE

## 2021-01-01 PROCEDURE — 36000705 HC OR TIME LEV I EA ADD 15 MIN: Performed by: SURGERY

## 2021-01-01 PROCEDURE — 36000707: Performed by: SURGERY

## 2021-01-01 PROCEDURE — 99214 PR OFFICE/OUTPT VISIT, EST, LEVL IV, 30-39 MIN: ICD-10-PCS | Mod: ,,, | Performed by: FAMILY MEDICINE

## 2021-01-01 PROCEDURE — 63600175 PHARM REV CODE 636 W HCPCS: Performed by: HOSPITALIST

## 2021-01-01 PROCEDURE — 27000165 HC TUBE, ETT CUFFED: Performed by: NURSE ANESTHETIST, CERTIFIED REGISTERED

## 2021-01-01 PROCEDURE — 99222 PR INITIAL HOSPITAL CARE,LEVL II: ICD-10-PCS | Mod: AI,,, | Performed by: INTERNAL MEDICINE

## 2021-01-01 PROCEDURE — 36000706: Performed by: SURGERY

## 2021-01-01 PROCEDURE — 99291 CRITICAL CARE FIRST HOUR: CPT | Mod: ,,, | Performed by: NURSE PRACTITIONER

## 2021-01-01 PROCEDURE — 27000510 HC BLANKET BAIR HUGGER ANY SIZE: Performed by: ANESTHESIOLOGY

## 2021-01-01 PROCEDURE — 93010 ELECTROCARDIOGRAM REPORT: CPT | Mod: ,,, | Performed by: INTERNAL MEDICINE

## 2021-01-01 PROCEDURE — 99232 PR SUBSEQUENT HOSPITAL CARE,LEVL II: ICD-10-PCS | Mod: ,,, | Performed by: INTERNAL MEDICINE

## 2021-01-01 PROCEDURE — 27000510 HC BLANKET BAIR HUGGER ANY SIZE: Performed by: NURSE ANESTHETIST, CERTIFIED REGISTERED

## 2021-01-01 PROCEDURE — 27000260 *HC AIRWAY ORAL: Performed by: NURSE ANESTHETIST, CERTIFIED REGISTERED

## 2021-01-01 PROCEDURE — 25000003 PHARM REV CODE 250: Performed by: HOSPITALIST

## 2021-01-01 PROCEDURE — 94002 VENT MGMT INPAT INIT DAY: CPT

## 2021-01-01 PROCEDURE — 11044 DBRDMT BONE 1ST 20 SQ CM/<: CPT | Mod: 58,,, | Performed by: SURGERY

## 2021-01-01 PROCEDURE — P9045 ALBUMIN (HUMAN), 5%, 250 ML: HCPCS | Performed by: NURSE ANESTHETIST, CERTIFIED REGISTERED

## 2021-01-01 PROCEDURE — 87040 BLOOD CULTURE FOR BACTERIA: CPT | Performed by: NURSE PRACTITIONER

## 2021-01-01 PROCEDURE — 85025 COMPLETE CBC W/AUTO DIFF WBC: CPT | Performed by: ANESTHESIOLOGY

## 2021-01-01 PROCEDURE — 49020 PR DRAIN ABD ABSCESS OPEN: ICD-10-PCS | Mod: 80,,, | Performed by: SURGERY

## 2021-01-01 PROCEDURE — 81001 URINALYSIS AUTO W/SCOPE: CPT | Performed by: NURSE PRACTITIONER

## 2021-01-01 PROCEDURE — 86901 BLOOD TYPING SEROLOGIC RH(D): CPT | Performed by: SURGERY

## 2021-01-01 PROCEDURE — D9220A PRA ANESTHESIA: ICD-10-PCS | Mod: CRNA,,,

## 2021-01-01 PROCEDURE — 99223 PR INITIAL HOSPITAL CARE,LEVL III: ICD-10-PCS | Mod: ,,, | Performed by: INTERNAL MEDICINE

## 2021-01-01 PROCEDURE — C1729 CATH, DRAINAGE: HCPCS | Performed by: SURGERY

## 2021-01-01 PROCEDURE — 82150 ASSAY OF AMYLASE: CPT | Performed by: NURSE PRACTITIONER

## 2021-01-01 PROCEDURE — 31622 DX BRONCHOSCOPE/WASH: CPT

## 2021-01-01 PROCEDURE — 80048 BASIC METABOLIC PNL TOTAL CA: CPT | Mod: XB | Performed by: SURGERY

## 2021-01-01 PROCEDURE — 94640 AIRWAY INHALATION TREATMENT: CPT

## 2021-01-01 PROCEDURE — 27200700 HC TUBE, ENDOTRACHEAL NASAL RAE: Performed by: ANESTHESIOLOGY

## 2021-01-01 PROCEDURE — 25000003 PHARM REV CODE 250

## 2021-01-01 PROCEDURE — 99284 PR EMERGENCY DEPT VISIT,LEVEL IV: ICD-10-PCS | Mod: ,,, | Performed by: NURSE PRACTITIONER

## 2021-01-01 PROCEDURE — 80170 ASSAY OF GENTAMICIN: CPT | Performed by: SURGERY

## 2021-01-01 PROCEDURE — 87077 CULTURE AEROBIC IDENTIFY: CPT | Performed by: SURGERY

## 2021-01-01 PROCEDURE — 83690 ASSAY OF LIPASE: CPT | Performed by: NURSE PRACTITIONER

## 2021-01-01 PROCEDURE — 99285 EMERGENCY DEPT VISIT HI MDM: CPT | Mod: 25

## 2021-01-01 PROCEDURE — 36591 DRAW BLOOD OFF VENOUS DEVICE: CPT | Performed by: INTERNAL MEDICINE

## 2021-01-01 PROCEDURE — 88304 TISSUE EXAM BY PATHOLOGIST: CPT | Mod: 26,,, | Performed by: PATHOLOGY

## 2021-01-01 PROCEDURE — 11044 PR DEBRIDEMENT, SKIN, SUB-Q TISSUE,MUSCLE,BONE,=<20 SQ CM: ICD-10-PCS | Mod: 58,,, | Performed by: SURGERY

## 2021-01-01 PROCEDURE — 86900 BLOOD TYPING SEROLOGIC ABO: CPT | Performed by: INTERNAL MEDICINE

## 2021-01-01 PROCEDURE — 36000704 HC OR TIME LEV I 1ST 15 MIN: Performed by: SURGERY

## 2021-01-01 PROCEDURE — P9047 ALBUMIN (HUMAN), 25%, 50ML: HCPCS | Performed by: INTERNAL MEDICINE

## 2021-01-01 PROCEDURE — 80048 BASIC METABOLIC PNL TOTAL CA: CPT | Performed by: ANESTHESIOLOGY

## 2021-01-01 PROCEDURE — 11004 DBRDMT SKIN XTRNL GENT&PER: CPT | Mod: ,,, | Performed by: SURGERY

## 2021-01-01 PROCEDURE — D9220A PRA ANESTHESIA: Mod: ,,, | Performed by: ANESTHESIOLOGY

## 2021-01-01 PROCEDURE — 86901 BLOOD TYPING SEROLOGIC RH(D): CPT

## 2021-01-01 PROCEDURE — 11004 PR DEBRIDE NECROTIC SKIN/ TISSUE, GENIT & PERINM: ICD-10-PCS | Mod: ,,, | Performed by: SURGERY

## 2021-01-01 PROCEDURE — 86923 COMPATIBILITY TEST ELECTRIC: CPT | Mod: 91 | Performed by: INTERNAL MEDICINE

## 2021-01-01 PROCEDURE — 87428 SARSCOV & INF VIR A&B AG IA: CPT | Performed by: NURSE PRACTITIONER

## 2021-01-01 PROCEDURE — 27202728 HC CATH, DWELLING, EXT

## 2021-01-01 PROCEDURE — 99233 SBSQ HOSP IP/OBS HIGH 50: CPT | Mod: 25,,, | Performed by: INTERNAL MEDICINE

## 2021-01-01 PROCEDURE — 49002 REOPENING OF ABDOMEN: CPT | Mod: 58,52,, | Performed by: SURGERY

## 2021-01-01 PROCEDURE — 99232 SBSQ HOSP IP/OBS MODERATE 35: CPT | Mod: ,,, | Performed by: INTERNAL MEDICINE

## 2021-01-01 RX ORDER — HEPARIN SODIUM 5000 [USP'U]/ML
5000 INJECTION, SOLUTION INTRAVENOUS; SUBCUTANEOUS EVERY 8 HOURS
Status: DISCONTINUED | OUTPATIENT
Start: 2021-01-01 | End: 2021-01-01

## 2021-01-01 RX ORDER — SODIUM CHLORIDE 9 MG/ML
INJECTION, SOLUTION INTRAVENOUS
Status: DISPENSED
Start: 2021-01-01 | End: 2021-01-01

## 2021-01-01 RX ORDER — GLUCAGON 1 MG
1 KIT INJECTION
Status: DISCONTINUED | OUTPATIENT
Start: 2021-01-01 | End: 2021-01-01

## 2021-01-01 RX ORDER — GLUCAGON 1 MG
1 KIT INJECTION
Status: DISCONTINUED | OUTPATIENT
Start: 2021-01-01 | End: 2022-06-26 | Stop reason: HOSPADM

## 2021-01-01 RX ORDER — ETOMIDATE 2 MG/ML
INJECTION INTRAVENOUS
Status: DISCONTINUED | OUTPATIENT
Start: 2021-01-01 | End: 2021-01-01

## 2021-01-01 RX ORDER — FENTANYL CITRATE 50 UG/ML
INJECTION, SOLUTION INTRAMUSCULAR; INTRAVENOUS
Status: DISCONTINUED | OUTPATIENT
Start: 2021-01-01 | End: 2021-01-01

## 2021-01-01 RX ORDER — ROCURONIUM BROMIDE 10 MG/ML
INJECTION, SOLUTION INTRAVENOUS
Status: DISCONTINUED | OUTPATIENT
Start: 2021-01-01 | End: 2021-01-01

## 2021-01-01 RX ORDER — HYDROCODONE BITARTRATE AND ACETAMINOPHEN 500; 5 MG/1; MG/1
TABLET ORAL
Status: DISCONTINUED | OUTPATIENT
Start: 2021-01-01 | End: 2021-01-01 | Stop reason: HOSPADM

## 2021-01-01 RX ORDER — PHENYLEPHRINE HYDROCHLORIDE 10 MG/ML
INJECTION INTRAVENOUS
Status: DISCONTINUED | OUTPATIENT
Start: 2021-01-01 | End: 2021-01-01

## 2021-01-01 RX ORDER — KETAMINE HYDROCHLORIDE 50 MG/ML
INJECTION, SOLUTION INTRAMUSCULAR; INTRAVENOUS
Status: DISCONTINUED | OUTPATIENT
Start: 2021-01-01 | End: 2021-01-01

## 2021-01-01 RX ORDER — TICAGRELOR 90 MG/1
90 TABLET ORAL 2 TIMES DAILY
Qty: 180 TABLET | Refills: 3 | Status: ON HOLD | OUTPATIENT
Start: 2021-01-01 | End: 2022-01-01 | Stop reason: HOSPADM

## 2021-01-01 RX ORDER — ACETAMINOPHEN 500 MG
500 TABLET ORAL EVERY 6 HOURS PRN
Status: DISCONTINUED | OUTPATIENT
Start: 2021-01-01 | End: 2022-01-01

## 2021-01-01 RX ORDER — SODIUM CHLORIDE 450 MG/100ML
INJECTION, SOLUTION INTRAVENOUS CONTINUOUS
Status: DISCONTINUED | OUTPATIENT
Start: 2021-01-01 | End: 2021-01-01

## 2021-01-01 RX ORDER — HYDROCODONE BITARTRATE AND ACETAMINOPHEN 500; 5 MG/1; MG/1
TABLET ORAL
Status: DISCONTINUED | OUTPATIENT
Start: 2021-01-01 | End: 2021-01-01

## 2021-01-01 RX ORDER — HEPARIN SODIUM 1000 [USP'U]/ML
4000 INJECTION, SOLUTION INTRAVENOUS; SUBCUTANEOUS ONCE
Status: COMPLETED | OUTPATIENT
Start: 2021-01-01 | End: 2021-01-01

## 2021-01-01 RX ORDER — PROPOFOL 10 MG/ML
0-50 INJECTION, EMULSION INTRAVENOUS CONTINUOUS
Status: DISCONTINUED | OUTPATIENT
Start: 2021-01-01 | End: 2021-01-01 | Stop reason: HOSPADM

## 2021-01-01 RX ORDER — SODIUM CHLORIDE 9 MG/ML
INJECTION, SOLUTION INTRAVENOUS
Status: DISCONTINUED | OUTPATIENT
Start: 2021-01-01 | End: 2021-01-01 | Stop reason: HOSPADM

## 2021-01-01 RX ORDER — METOPROLOL TARTRATE 25 MG/1
25 TABLET, FILM COATED ORAL 2 TIMES DAILY
Qty: 90 TABLET | Refills: 3 | Status: SHIPPED | OUTPATIENT
Start: 2021-01-01 | End: 2022-01-01

## 2021-01-01 RX ORDER — MIDAZOLAM HYDROCHLORIDE 1 MG/ML
INJECTION INTRAMUSCULAR; INTRAVENOUS
Status: DISCONTINUED | OUTPATIENT
Start: 2021-01-01 | End: 2021-01-01

## 2021-01-01 RX ORDER — LIDOCAINE HYDROCHLORIDE 20 MG/ML
INJECTION INTRAVENOUS
Status: DISCONTINUED | OUTPATIENT
Start: 2021-01-01 | End: 2021-01-01

## 2021-01-01 RX ORDER — ALBUMIN HUMAN 50 G/1000ML
SOLUTION INTRAVENOUS CONTINUOUS PRN
Status: DISCONTINUED | OUTPATIENT
Start: 2021-01-01 | End: 2021-01-01

## 2021-01-01 RX ORDER — FENTANYL CITRAT/DEXTROSE 5%/PF 100 MCG/10
0-200 PATIENT CONTROLLED ANALGESIA SYRINGE INTRAVENOUS
Status: DISCONTINUED | OUTPATIENT
Start: 2021-01-01 | End: 2022-01-01

## 2021-01-01 RX ORDER — TICAGRELOR 90 MG/1
TABLET ORAL
Qty: 60 TABLET | Refills: 0 | Status: ON HOLD | OUTPATIENT
Start: 2021-01-01 | End: 2021-01-01 | Stop reason: CLARIF

## 2021-01-01 RX ORDER — INSULIN ASPART 100 [IU]/ML
1-10 INJECTION, SOLUTION INTRAVENOUS; SUBCUTANEOUS EVERY 6 HOURS PRN
Status: DISCONTINUED | OUTPATIENT
Start: 2021-01-01 | End: 2022-01-01

## 2021-01-01 RX ORDER — FENTANYL CITRAT/DEXTROSE 5%/PF 100 MCG/10
0-200 PATIENT CONTROLLED ANALGESIA SYRINGE INTRAVENOUS CONTINUOUS
Status: DISCONTINUED | OUTPATIENT
Start: 2021-01-01 | End: 2021-01-01 | Stop reason: HOSPADM

## 2021-01-01 RX ORDER — HYDROCODONE BITARTRATE AND ACETAMINOPHEN 7.5; 325 MG/1; MG/1
1 TABLET ORAL EVERY 4 HOURS PRN
Status: DISCONTINUED | OUTPATIENT
Start: 2021-01-01 | End: 2021-01-01

## 2021-01-01 RX ORDER — LISINOPRIL 10 MG/1
10 TABLET ORAL DAILY
Qty: 90 TABLET | Refills: 3 | Status: SHIPPED | OUTPATIENT
Start: 2021-01-01 | End: 2021-01-01 | Stop reason: SDUPTHER

## 2021-01-01 RX ORDER — HEPARIN SODIUM 1000 [USP'U]/ML
4000 INJECTION, SOLUTION INTRAVENOUS; SUBCUTANEOUS
Status: DISCONTINUED | OUTPATIENT
Start: 2021-01-01 | End: 2022-06-26 | Stop reason: HOSPADM

## 2021-01-01 RX ORDER — ACETAMINOPHEN 500 MG
500 TABLET ORAL EVERY 6 HOURS PRN
Status: DISCONTINUED | OUTPATIENT
Start: 2021-01-01 | End: 2021-01-01 | Stop reason: HOSPADM

## 2021-01-01 RX ORDER — PANTOPRAZOLE SODIUM 40 MG/10ML
40 INJECTION, POWDER, LYOPHILIZED, FOR SOLUTION INTRAVENOUS DAILY
Status: DISCONTINUED | OUTPATIENT
Start: 2021-01-01 | End: 2021-01-01

## 2021-01-01 RX ORDER — METOPROLOL TARTRATE 25 MG/1
TABLET, FILM COATED ORAL
Qty: 60 TABLET | Refills: 0 | Status: ON HOLD | OUTPATIENT
Start: 2021-01-01 | End: 2021-01-01 | Stop reason: CLARIF

## 2021-01-01 RX ORDER — ALBUMIN HUMAN 250 G/1000ML
25 SOLUTION INTRAVENOUS ONCE
Status: COMPLETED | OUTPATIENT
Start: 2021-01-01 | End: 2021-01-01

## 2021-01-01 RX ORDER — METOPROLOL TARTRATE 25 MG/1
25 TABLET, FILM COATED ORAL 2 TIMES DAILY
COMMUNITY
End: 2021-01-01 | Stop reason: SDUPTHER

## 2021-01-01 RX ORDER — GLUCAGON 1 MG
1 KIT INJECTION
Status: DISCONTINUED | OUTPATIENT
Start: 2021-01-01 | End: 2021-01-01 | Stop reason: HOSPADM

## 2021-01-01 RX ORDER — PROPOFOL 10 MG/ML
VIAL (ML) INTRAVENOUS
Status: DISCONTINUED | OUTPATIENT
Start: 2021-01-01 | End: 2021-01-01

## 2021-01-01 RX ORDER — CEFAZOLIN SODIUM 1 G/3ML
INJECTION, POWDER, FOR SOLUTION INTRAMUSCULAR; INTRAVENOUS
Status: DISCONTINUED | OUTPATIENT
Start: 2021-01-01 | End: 2021-01-01

## 2021-01-01 RX ORDER — HEPARIN SODIUM 1000 [USP'U]/ML
4000 INJECTION, SOLUTION INTRAVENOUS; SUBCUTANEOUS
Status: DISCONTINUED | OUTPATIENT
Start: 2021-01-01 | End: 2021-01-01 | Stop reason: HOSPADM

## 2021-01-01 RX ORDER — HEPARIN SODIUM 5000 [USP'U]/ML
5000 INJECTION, SOLUTION INTRAVENOUS; SUBCUTANEOUS EVERY 8 HOURS
Status: DISCONTINUED | OUTPATIENT
Start: 2021-01-01 | End: 2021-01-01 | Stop reason: HOSPADM

## 2021-01-01 RX ORDER — LISINOPRIL 10 MG/1
10 TABLET ORAL DAILY
Qty: 90 TABLET | Refills: 3 | Status: SHIPPED | OUTPATIENT
Start: 2021-01-01 | End: 2022-01-01

## 2021-01-01 RX ORDER — LIDOCAINE HYDROCHLORIDE 20 MG/ML
INJECTION, SOLUTION EPIDURAL; INFILTRATION; INTRACAUDAL; PERINEURAL
Status: DISCONTINUED | OUTPATIENT
Start: 2021-01-01 | End: 2021-01-01

## 2021-01-01 RX ORDER — MORPHINE SULFATE 4 MG/ML
4 INJECTION, SOLUTION INTRAMUSCULAR; INTRAVENOUS EVERY 4 HOURS PRN
Status: DISCONTINUED | OUTPATIENT
Start: 2021-01-01 | End: 2021-01-01

## 2021-01-01 RX ORDER — INSULIN ASPART 100 [IU]/ML
1-10 INJECTION, SOLUTION INTRAVENOUS; SUBCUTANEOUS
Status: DISCONTINUED | OUTPATIENT
Start: 2021-01-01 | End: 2021-01-01

## 2021-01-01 RX ORDER — CLINDAMYCIN PHOSPHATE 900 MG/50ML
900 INJECTION, SOLUTION INTRAVENOUS
Status: DISCONTINUED | OUTPATIENT
Start: 2021-01-01 | End: 2021-01-01

## 2021-01-01 RX ORDER — INSULIN GLARGINE 100 [IU]/ML
INJECTION, SOLUTION SUBCUTANEOUS
COMMUNITY
End: 2022-01-01

## 2021-01-01 RX ORDER — METOPROLOL TARTRATE 25 MG/1
25 TABLET, FILM COATED ORAL 2 TIMES DAILY
Status: DISCONTINUED | OUTPATIENT
Start: 2021-01-01 | End: 2021-01-01

## 2021-01-01 RX ORDER — SODIUM CHLORIDE 9 MG/ML
INJECTION, SOLUTION INTRAVENOUS
Status: DISCONTINUED | OUTPATIENT
Start: 2021-01-01 | End: 2021-01-01

## 2021-01-01 RX ORDER — FENTANYL CITRAT/DEXTROSE 5%/PF 100 MCG/10
0-200 PATIENT CONTROLLED ANALGESIA SYRINGE INTRAVENOUS CONTINUOUS
Status: DISCONTINUED | OUTPATIENT
Start: 2021-01-01 | End: 2021-01-01

## 2021-01-01 RX ORDER — MORPHINE SULFATE 4 MG/ML
4 INJECTION, SOLUTION INTRAMUSCULAR; INTRAVENOUS
Status: COMPLETED | OUTPATIENT
Start: 2021-01-01 | End: 2021-01-01

## 2021-01-01 RX ORDER — ONDANSETRON 2 MG/ML
4 INJECTION INTRAMUSCULAR; INTRAVENOUS
Status: COMPLETED | OUTPATIENT
Start: 2021-01-01 | End: 2021-01-01

## 2021-01-01 RX ORDER — PANTOPRAZOLE SODIUM 40 MG/10ML
40 INJECTION, POWDER, LYOPHILIZED, FOR SOLUTION INTRAVENOUS DAILY
Status: DISCONTINUED | OUTPATIENT
Start: 2021-01-01 | End: 2021-01-01 | Stop reason: HOSPADM

## 2021-01-01 RX ORDER — ALBUMIN HUMAN 250 G/1000ML
25 SOLUTION INTRAVENOUS ONCE
Status: DISCONTINUED | OUTPATIENT
Start: 2021-01-01 | End: 2021-01-01

## 2021-01-01 RX ORDER — SODIUM CHLORIDE 9 MG/ML
INJECTION, SOLUTION INTRAVENOUS
Status: COMPLETED | OUTPATIENT
Start: 2021-01-01 | End: 2021-01-01

## 2021-01-01 RX ORDER — HYDROCODONE BITARTRATE AND ACETAMINOPHEN 500; 5 MG/1; MG/1
TABLET ORAL
Status: DISCONTINUED | OUTPATIENT
Start: 2021-01-01 | End: 2022-01-01

## 2021-01-01 RX ORDER — LISINOPRIL 10 MG/1
10 TABLET ORAL DAILY
COMMUNITY
Start: 2021-01-01 | End: 2021-01-01 | Stop reason: SDUPTHER

## 2021-01-01 RX ORDER — TICAGRELOR 90 MG/1
90 TABLET ORAL DAILY
Qty: 90 TABLET | Refills: 3 | Status: SHIPPED | OUTPATIENT
Start: 2021-01-01 | End: 2021-01-01 | Stop reason: SDUPTHER

## 2021-01-01 RX ORDER — SODIUM CHLORIDE 9 MG/ML
INJECTION, SOLUTION INTRAVENOUS CONTINUOUS
Status: DISCONTINUED | OUTPATIENT
Start: 2021-01-01 | End: 2021-01-01

## 2021-01-01 RX ORDER — ONDANSETRON 2 MG/ML
INJECTION INTRAMUSCULAR; INTRAVENOUS
Status: DISCONTINUED | OUTPATIENT
Start: 2021-01-01 | End: 2021-01-01

## 2021-01-01 RX ORDER — SODIUM CHLORIDE 450 MG/100ML
INJECTION, SOLUTION INTRAVENOUS CONTINUOUS PRN
Status: DISCONTINUED | OUTPATIENT
Start: 2021-01-01 | End: 2021-01-01

## 2021-01-01 RX ORDER — PANTOPRAZOLE SODIUM 40 MG/1
40 FOR SUSPENSION ORAL DAILY
Status: DISCONTINUED | OUTPATIENT
Start: 2021-01-01 | End: 2022-01-01

## 2021-01-01 RX ORDER — METOPROLOL TARTRATE 25 MG/1
25 TABLET, FILM COATED ORAL 2 TIMES DAILY
Qty: 90 TABLET | Refills: 3 | Status: SHIPPED | OUTPATIENT
Start: 2021-01-01 | End: 2021-01-01 | Stop reason: SDUPTHER

## 2021-01-01 RX ORDER — ALBUTEROL SULFATE 0.83 MG/ML
10 SOLUTION RESPIRATORY (INHALATION) ONCE
Status: COMPLETED | OUTPATIENT
Start: 2021-01-01 | End: 2021-01-01

## 2021-01-01 RX ORDER — SODIUM CHLORIDE 9 MG/ML
250 INJECTION, SOLUTION INTRAVENOUS
Status: DISCONTINUED | OUTPATIENT
Start: 2021-01-01 | End: 2022-01-01

## 2021-01-01 RX ORDER — PROPOFOL 10 MG/ML
0-50 INJECTION, EMULSION INTRAVENOUS CONTINUOUS
Status: DISCONTINUED | OUTPATIENT
Start: 2021-01-01 | End: 2021-01-01

## 2021-01-01 RX ORDER — SODIUM CHLORIDE 0.9 G/100ML
IRRIGANT IRRIGATION
Status: DISCONTINUED | OUTPATIENT
Start: 2021-01-01 | End: 2021-01-01 | Stop reason: HOSPADM

## 2021-01-01 RX ORDER — PROPOFOL 10 MG/ML
0-50 INJECTION, EMULSION INTRAVENOUS
Status: DISCONTINUED | OUTPATIENT
Start: 2021-01-01 | End: 2022-01-01

## 2021-01-01 RX ORDER — INSULIN ASPART 100 [IU]/ML
1-10 INJECTION, SOLUTION INTRAVENOUS; SUBCUTANEOUS EVERY 6 HOURS PRN
Status: DISCONTINUED | OUTPATIENT
Start: 2021-01-01 | End: 2021-01-01 | Stop reason: HOSPADM

## 2021-01-01 RX ORDER — TICAGRELOR 90 MG/1
90 TABLET ORAL 2 TIMES DAILY
COMMUNITY
Start: 2021-01-01 | End: 2021-01-01 | Stop reason: SDUPTHER

## 2021-01-01 RX ORDER — SODIUM CHLORIDE 9 MG/ML
INJECTION, SOLUTION INTRAVENOUS
Status: DISCONTINUED | OUTPATIENT
Start: 2021-01-01 | End: 2022-01-01 | Stop reason: SDUPTHER

## 2021-01-01 RX ORDER — ACETAMINOPHEN 500 MG
500 TABLET ORAL EVERY 6 HOURS PRN
Status: DISCONTINUED | OUTPATIENT
Start: 2021-01-01 | End: 2021-01-01

## 2021-01-01 RX ADMIN — PANTOPRAZOLE SODIUM 40 MG: 40 INJECTION, POWDER, FOR SOLUTION INTRAVENOUS at 08:12

## 2021-01-01 RX ADMIN — INSULIN ASPART 8 UNITS: 100 INJECTION, SOLUTION INTRAVENOUS; SUBCUTANEOUS at 07:12

## 2021-01-01 RX ADMIN — CEFAZOLIN 2 G: 1 INJECTION, POWDER, FOR SOLUTION INTRAMUSCULAR; INTRAVENOUS; PARENTERAL at 11:12

## 2021-01-01 RX ADMIN — SODIUM CHLORIDE: 9 INJECTION, SOLUTION INTRAVENOUS at 10:12

## 2021-01-01 RX ADMIN — PROPOFOL 44.9 MCG/KG/MIN: 10 INJECTION, EMULSION INTRAVENOUS at 12:12

## 2021-01-01 RX ADMIN — HEPARIN SODIUM 5000 UNITS: 5000 INJECTION INTRAVENOUS; SUBCUTANEOUS at 09:12

## 2021-01-01 RX ADMIN — PROPOFOL 44.9 MCG/KG/MIN: 10 INJECTION, EMULSION INTRAVENOUS at 03:12

## 2021-01-01 RX ADMIN — PHENYLEPHRINE HYDROCHLORIDE 100 MCG: 10 INJECTION INTRAVENOUS at 10:12

## 2021-01-01 RX ADMIN — INSULIN DETEMIR 40 UNITS: 100 INJECTION, SOLUTION SUBCUTANEOUS at 09:12

## 2021-01-01 RX ADMIN — PROPOFOL 10 MCG/KG/MIN: 10 INJECTION, EMULSION INTRAVENOUS at 03:12

## 2021-01-01 RX ADMIN — ALBUMIN (HUMAN) 25 G: 12.5 SOLUTION INTRAVENOUS at 11:12

## 2021-01-01 RX ADMIN — MEROPENEM 500 MG: 500 INJECTION, POWDER, FOR SOLUTION INTRAVENOUS at 08:12

## 2021-01-01 RX ADMIN — PROPOFOL 46.73 MCG/KG/MIN: 10 INJECTION, EMULSION INTRAVENOUS at 06:12

## 2021-01-01 RX ADMIN — INSULIN ASPART 8 UNITS: 100 INJECTION, SOLUTION INTRAVENOUS; SUBCUTANEOUS at 05:12

## 2021-01-01 RX ADMIN — ALBUTEROL SULFATE 10 MG: 2.5 SOLUTION RESPIRATORY (INHALATION) at 09:12

## 2021-01-01 RX ADMIN — INSULIN ASPART 2 UNITS: 100 INJECTION, SOLUTION INTRAVENOUS; SUBCUTANEOUS at 06:12

## 2021-01-01 RX ADMIN — SODIUM CHLORIDE: 9 INJECTION, SOLUTION INTRAVENOUS at 11:12

## 2021-01-01 RX ADMIN — TICAGRELOR 90 MG: 90 TABLET ORAL at 09:12

## 2021-01-01 RX ADMIN — CLINDAMYCIN PHOSPHATE 900 MG: 900 INJECTION, SOLUTION INTRAVENOUS at 09:12

## 2021-01-01 RX ADMIN — LIDOCAINE HYDROCHLORIDE 100 MG: 20 INJECTION, SOLUTION INTRAVENOUS at 10:12

## 2021-01-01 RX ADMIN — SODIUM CHLORIDE: 4.5 INJECTION, SOLUTION INTRAVENOUS at 12:12

## 2021-01-01 RX ADMIN — INSULIN ASPART 6 UNITS: 100 INJECTION, SOLUTION INTRAVENOUS; SUBCUTANEOUS at 05:12

## 2021-01-01 RX ADMIN — SODIUM CHLORIDE: 9 INJECTION, SOLUTION INTRAVENOUS at 07:12

## 2021-01-01 RX ADMIN — PROPOFOL 15 MCG/KG/MIN: 10 INJECTION, EMULSION INTRAVENOUS at 12:12

## 2021-01-01 RX ADMIN — PROPOFOL 44.9 MCG/KG/MIN: 10 INJECTION, EMULSION INTRAVENOUS at 07:12

## 2021-01-01 RX ADMIN — PROPOFOL 44.9 MCG/KG/MIN: 10 INJECTION, EMULSION INTRAVENOUS at 11:12

## 2021-01-01 RX ADMIN — HEPARIN SODIUM 5000 UNITS: 5000 INJECTION INTRAVENOUS; SUBCUTANEOUS at 10:12

## 2021-01-01 RX ADMIN — SODIUM CHLORIDE, POTASSIUM CHLORIDE, SODIUM LACTATE AND CALCIUM CHLORIDE 1000 ML: 600; 310; 30; 20 INJECTION, SOLUTION INTRAVENOUS at 12:12

## 2021-01-01 RX ADMIN — INSULIN ASPART 10 UNITS: 100 INJECTION, SOLUTION INTRAVENOUS; SUBCUTANEOUS at 12:12

## 2021-01-01 RX ADMIN — SODIUM CHLORIDE: 9 INJECTION, SOLUTION INTRAVENOUS at 04:12

## 2021-01-01 RX ADMIN — AMPICILLIN SODIUM AND SULBACTAM SODIUM 1.5 G: 1; .5 INJECTION, POWDER, FOR SOLUTION INTRAMUSCULAR; INTRAVENOUS at 09:12

## 2021-01-01 RX ADMIN — PROPOFOL 50 MCG/KG/MIN: 10 INJECTION, EMULSION INTRAVENOUS at 10:12

## 2021-01-01 RX ADMIN — FENTANYL CITRATE 150 MCG/HR: 50 INJECTION, SOLUTION INTRAMUSCULAR; INTRAVENOUS at 08:12

## 2021-01-01 RX ADMIN — INSULIN ASPART 8 UNITS: 100 INJECTION, SOLUTION INTRAVENOUS; SUBCUTANEOUS at 06:12

## 2021-01-01 RX ADMIN — PIPERACILLIN AND TAZOBACTAM 4.5 G: 4; .5 INJECTION, POWDER, LYOPHILIZED, FOR SOLUTION INTRAVENOUS; PARENTERAL at 09:12

## 2021-01-01 RX ADMIN — CEFTRIAXONE SODIUM 2 G: 2 INJECTION, POWDER, FOR SOLUTION INTRAMUSCULAR; INTRAVENOUS at 02:12

## 2021-01-01 RX ADMIN — FENTANYL CITRATE 100 MCG/HR: 50 INJECTION, SOLUTION INTRAMUSCULAR; INTRAVENOUS at 03:12

## 2021-01-01 RX ADMIN — PROPOFOL 14.7 MCG/KG/MIN: 10 INJECTION, EMULSION INTRAVENOUS at 09:12

## 2021-01-01 RX ADMIN — CLINDAMYCIN PHOSPHATE 900 MG: 900 INJECTION, SOLUTION INTRAVENOUS at 01:12

## 2021-01-01 RX ADMIN — AMPICILLIN SODIUM 2 G: 2 INJECTION, POWDER, FOR SOLUTION INTRAMUSCULAR; INTRAVENOUS at 10:12

## 2021-01-01 RX ADMIN — PROPOFOL 45 MCG/KG/MIN: 10 INJECTION, EMULSION INTRAVENOUS at 08:12

## 2021-01-01 RX ADMIN — INSULIN ASPART 1 UNITS: 100 INJECTION, SOLUTION INTRAVENOUS; SUBCUTANEOUS at 12:12

## 2021-01-01 RX ADMIN — PROPOFOL 25 MCG/KG/MIN: 10 INJECTION, EMULSION INTRAVENOUS at 08:12

## 2021-01-01 RX ADMIN — LIDOCAINE HYDROCHLORIDE 75 MG: 20 INJECTION, SOLUTION EPIDURAL; INFILTRATION; INTRACAUDAL; PERINEURAL at 10:12

## 2021-01-01 RX ADMIN — PROPOFOL 100 MG: 10 INJECTION, EMULSION INTRAVENOUS at 10:12

## 2021-01-01 RX ADMIN — PROPOFOL 45 MCG/KG/MIN: 10 INJECTION, EMULSION INTRAVENOUS at 05:12

## 2021-01-01 RX ADMIN — MIDAZOLAM 2 MG: 1 INJECTION INTRAMUSCULAR; INTRAVENOUS at 09:12

## 2021-01-01 RX ADMIN — HEPARIN SODIUM 4000 UNITS: 1000 INJECTION INTRAVENOUS; SUBCUTANEOUS at 12:12

## 2021-01-01 RX ADMIN — PROPOFOL 25 MCG/KG/MIN: 10 INJECTION, EMULSION INTRAVENOUS at 02:12

## 2021-01-01 RX ADMIN — HEPARIN SODIUM 5000 UNITS: 5000 INJECTION INTRAVENOUS; SUBCUTANEOUS at 02:12

## 2021-01-01 RX ADMIN — INSULIN ASPART 10 UNITS: 100 INJECTION, SOLUTION INTRAVENOUS; SUBCUTANEOUS at 06:12

## 2021-01-01 RX ADMIN — HEPARIN SODIUM 4000 UNITS: 1000 INJECTION INTRAVENOUS; SUBCUTANEOUS at 05:12

## 2021-01-01 RX ADMIN — ACETAMINOPHEN 500 MG: 500 TABLET ORAL at 09:12

## 2021-01-01 RX ADMIN — ROCURONIUM BROMIDE 50 MG: 10 INJECTION, SOLUTION INTRAVENOUS at 10:12

## 2021-01-01 RX ADMIN — ROCURONIUM BROMIDE 50 MG: 10 INJECTION, SOLUTION INTRAVENOUS at 09:12

## 2021-01-01 RX ADMIN — HEPARIN SODIUM 5000 UNITS: 5000 INJECTION INTRAVENOUS; SUBCUTANEOUS at 05:12

## 2021-01-01 RX ADMIN — PROPOFOL 10 MCG/KG/MIN: 10 INJECTION, EMULSION INTRAVENOUS at 07:12

## 2021-01-01 RX ADMIN — PANTOPRAZOLE SODIUM 40 MG: 40 INJECTION, POWDER, FOR SOLUTION INTRAVENOUS at 09:12

## 2021-01-01 RX ADMIN — INSULIN DETEMIR 45 UNITS: 100 INJECTION, SOLUTION SUBCUTANEOUS at 09:12

## 2021-01-01 RX ADMIN — INSULIN ASPART 3 UNITS: 100 INJECTION, SOLUTION INTRAVENOUS; SUBCUTANEOUS at 12:12

## 2021-01-01 RX ADMIN — PROPOFOL 24.44 MCG/KG/MIN: 10 INJECTION, EMULSION INTRAVENOUS at 11:12

## 2021-01-01 RX ADMIN — NOREPINEPHRINE BITARTRATE 0.1 MCG/KG/MIN: 1 INJECTION, SOLUTION, CONCENTRATE INTRAVENOUS at 09:12

## 2021-01-01 RX ADMIN — INSULIN ASPART 2 UNITS: 100 INJECTION, SOLUTION INTRAVENOUS; SUBCUTANEOUS at 05:12

## 2021-01-01 RX ADMIN — ROCURONIUM BROMIDE 50 MG: 10 INJECTION, SOLUTION INTRAVENOUS at 01:12

## 2021-01-01 RX ADMIN — AMPICILLIN SODIUM 2 G: 2 INJECTION, POWDER, FOR SOLUTION INTRAMUSCULAR; INTRAVENOUS at 02:12

## 2021-01-01 RX ADMIN — CALCIUM GLUCONATE 1 G: 98 INJECTION, SOLUTION INTRAVENOUS at 03:12

## 2021-01-01 RX ADMIN — FENTANYL CITRATE 25 MCG/HR: 50 INJECTION, SOLUTION INTRAMUSCULAR; INTRAVENOUS at 01:12

## 2021-01-01 RX ADMIN — PROPOFOL 45.64 MCG/KG/MIN: 10 INJECTION, EMULSION INTRAVENOUS at 03:12

## 2021-01-01 RX ADMIN — TICAGRELOR 90 MG: 90 TABLET ORAL at 08:12

## 2021-01-01 RX ADMIN — INSULIN ASPART 1 UNITS: 100 INJECTION, SOLUTION INTRAVENOUS; SUBCUTANEOUS at 11:12

## 2021-01-01 RX ADMIN — INSULIN DETEMIR 30 UNITS: 100 INJECTION, SOLUTION SUBCUTANEOUS at 09:12

## 2021-01-01 RX ADMIN — INSULIN ASPART 4 UNITS: 100 INJECTION, SOLUTION INTRAVENOUS; SUBCUTANEOUS at 12:12

## 2021-01-01 RX ADMIN — PHENYLEPHRINE HYDROCHLORIDE 100 MCG: 10 INJECTION INTRAVENOUS at 11:12

## 2021-01-01 RX ADMIN — HEPARIN SODIUM 5000 UNITS: 5000 INJECTION INTRAVENOUS; SUBCUTANEOUS at 01:12

## 2021-01-01 RX ADMIN — INSULIN ASPART 10 UNITS: 100 INJECTION, SOLUTION INTRAVENOUS; SUBCUTANEOUS at 08:12

## 2021-01-01 RX ADMIN — AMPICILLIN SODIUM 2 G: 2 INJECTION, POWDER, FOR SOLUTION INTRAMUSCULAR; INTRAVENOUS at 09:12

## 2021-01-01 RX ADMIN — INSULIN ASPART 10 UNITS: 100 INJECTION, SOLUTION INTRAVENOUS; SUBCUTANEOUS at 05:12

## 2021-01-01 RX ADMIN — INSULIN ASPART 4 UNITS: 100 INJECTION, SOLUTION INTRAVENOUS; SUBCUTANEOUS at 11:12

## 2021-01-01 RX ADMIN — CLINDAMYCIN PHOSPHATE 900 MG: 900 INJECTION, SOLUTION INTRAVENOUS at 10:12

## 2021-01-01 RX ADMIN — INSULIN ASPART 2 UNITS: 100 INJECTION, SOLUTION INTRAVENOUS; SUBCUTANEOUS at 12:12

## 2021-01-01 RX ADMIN — HEPARIN SODIUM 4000 UNITS: 1000 INJECTION INTRAVENOUS; SUBCUTANEOUS at 09:12

## 2021-01-01 RX ADMIN — NOREPINEPHRINE BITARTRATE 0.38 MCG/KG/MIN: 1 INJECTION, SOLUTION, CONCENTRATE INTRAVENOUS at 07:12

## 2021-01-01 RX ADMIN — FENTANYL CITRATE 150 MCG/HR: 50 INJECTION, SOLUTION INTRAMUSCULAR; INTRAVENOUS at 05:12

## 2021-01-01 RX ADMIN — PANTOPRAZOLE SODIUM 40 MG: 40 INJECTION, POWDER, FOR SOLUTION INTRAVENOUS at 10:12

## 2021-01-01 RX ADMIN — SODIUM CHLORIDE: 9 INJECTION, SOLUTION INTRAVENOUS at 09:12

## 2021-01-01 RX ADMIN — PROPOFOL 9.74 MCG/KG/MIN: 10 INJECTION, EMULSION INTRAVENOUS at 04:12

## 2021-01-01 RX ADMIN — PROPOFOL 5 MCG/KG/MIN: 10 INJECTION, EMULSION INTRAVENOUS at 03:12

## 2021-01-01 RX ADMIN — AMPICILLIN SODIUM AND SULBACTAM SODIUM 1.5 G: 1; .5 INJECTION, POWDER, FOR SOLUTION INTRAMUSCULAR; INTRAVENOUS at 08:12

## 2021-01-01 RX ADMIN — ALBUMIN (HUMAN): 12.5 INJECTION, SOLUTION INTRAVENOUS at 01:12

## 2021-01-01 RX ADMIN — PROPOFOL 45.64 MCG/KG/MIN: 10 INJECTION, EMULSION INTRAVENOUS at 11:12

## 2021-01-01 RX ADMIN — FENTANYL CITRATE 100 MCG/HR: 50 INJECTION, SOLUTION INTRAMUSCULAR; INTRAVENOUS at 10:12

## 2021-01-01 RX ADMIN — PROPOFOL 25 MCG/KG/MIN: 10 INJECTION, EMULSION INTRAVENOUS at 04:12

## 2021-01-01 RX ADMIN — HEPARIN SODIUM 5000 UNITS: 5000 INJECTION INTRAVENOUS; SUBCUTANEOUS at 04:12

## 2021-01-01 RX ADMIN — SODIUM CHLORIDE 1000 ML: 9 INJECTION, SOLUTION INTRAVENOUS at 02:12

## 2021-01-01 RX ADMIN — CLINDAMYCIN PHOSPHATE 900 MG: 900 INJECTION, SOLUTION INTRAVENOUS at 02:12

## 2021-01-01 RX ADMIN — PROPOFOL 10 MCG/KG/MIN: 10 INJECTION, EMULSION INTRAVENOUS at 12:12

## 2021-01-01 RX ADMIN — HEPARIN SODIUM 5000 UNITS: 5000 INJECTION INTRAVENOUS; SUBCUTANEOUS at 06:12

## 2021-01-01 RX ADMIN — FENTANYL CITRATE 150 MCG/HR: 50 INJECTION, SOLUTION INTRAMUSCULAR; INTRAVENOUS at 04:12

## 2021-01-01 RX ADMIN — ACETAMINOPHEN 500 MG: 500 TABLET ORAL at 08:12

## 2021-01-01 RX ADMIN — NOREPINEPHRINE BITARTRATE 0.02 MCG/KG/MIN: 1 INJECTION, SOLUTION, CONCENTRATE INTRAVENOUS at 07:12

## 2021-01-01 RX ADMIN — AMPICILLIN SODIUM AND SULBACTAM SODIUM 1.5 G: 1; .5 INJECTION, POWDER, FOR SOLUTION INTRAMUSCULAR; INTRAVENOUS at 10:12

## 2021-01-01 RX ADMIN — TICAGRELOR 90 MG: 90 TABLET ORAL at 10:12

## 2021-01-01 RX ADMIN — INSULIN DETEMIR 45 UNITS: 100 INJECTION, SOLUTION SUBCUTANEOUS at 08:12

## 2021-01-01 RX ADMIN — PROPOFOL 46.73 MCG/KG/MIN: 10 INJECTION, EMULSION INTRAVENOUS at 10:12

## 2021-01-01 RX ADMIN — SUGAMMADEX 200 MG: 100 INJECTION, SOLUTION INTRAVENOUS at 11:12

## 2021-01-01 RX ADMIN — HEPARIN SODIUM 4000 UNITS: 1000 INJECTION INTRAVENOUS; SUBCUTANEOUS at 04:12

## 2021-01-01 RX ADMIN — INSULIN DETEMIR 20 UNITS: 100 INJECTION, SOLUTION SUBCUTANEOUS at 09:12

## 2021-01-01 RX ADMIN — DEXTROSE MONOHYDRATE 25 G: 25 INJECTION, SOLUTION INTRAVENOUS at 08:12

## 2021-01-01 RX ADMIN — AMPICILLIN SODIUM 2 G: 2 INJECTION, POWDER, FOR SOLUTION INTRAMUSCULAR; INTRAVENOUS at 04:12

## 2021-01-01 RX ADMIN — PROPOFOL 44.9 MCG/KG/MIN: 10 INJECTION, EMULSION INTRAVENOUS at 08:12

## 2021-01-01 RX ADMIN — PROPOFOL 50 MCG/KG/MIN: 10 INJECTION, EMULSION INTRAVENOUS at 07:12

## 2021-01-01 RX ADMIN — PROPOFOL 45 MCG/KG/MIN: 10 INJECTION, EMULSION INTRAVENOUS at 02:12

## 2021-01-01 RX ADMIN — SODIUM CHLORIDE: 9 INJECTION, SOLUTION INTRAVENOUS at 03:12

## 2021-01-01 RX ADMIN — PHENYLEPHRINE HYDROCHLORIDE 200 MCG: 10 INJECTION INTRAVENOUS at 11:12

## 2021-01-01 RX ADMIN — INSULIN ASPART 8 UNITS: 100 INJECTION, SOLUTION INTRAVENOUS; SUBCUTANEOUS at 04:12

## 2021-01-01 RX ADMIN — MORPHINE SULFATE 4 MG: 4 INJECTION INTRAVENOUS at 08:12

## 2021-01-01 RX ADMIN — SODIUM CHLORIDE 100 ML/HR: 9 INJECTION, SOLUTION INTRAVENOUS at 05:12

## 2021-01-01 RX ADMIN — PANTOPRAZOLE SODIUM 40 MG: 40 GRANULE, DELAYED RELEASE ORAL at 08:12

## 2021-01-01 RX ADMIN — FENTANYL CITRATE 200 MCG/HR: 50 INJECTION, SOLUTION INTRAMUSCULAR; INTRAVENOUS at 01:12

## 2021-01-01 RX ADMIN — AMPICILLIN SODIUM 2 G: 2 INJECTION, POWDER, FOR SOLUTION INTRAMUSCULAR; INTRAVENOUS at 05:12

## 2021-01-01 RX ADMIN — PHENYLEPHRINE HYDROCHLORIDE 100 MCG: 10 INJECTION INTRAVENOUS at 12:12

## 2021-01-01 RX ADMIN — ROCURONIUM BROMIDE 20 MG: 10 INJECTION, SOLUTION INTRAVENOUS at 12:12

## 2021-01-01 RX ADMIN — PROPOFOL 41.44 MCG/KG/MIN: 10 INJECTION, EMULSION INTRAVENOUS at 08:12

## 2021-01-01 RX ADMIN — SODIUM CHLORIDE: 9 INJECTION, SOLUTION INTRAVENOUS at 12:12

## 2021-01-01 RX ADMIN — CLINDAMYCIN PHOSPHATE 900 MG: 900 INJECTION, SOLUTION INTRAVENOUS at 05:12

## 2021-01-01 RX ADMIN — SODIUM CHLORIDE: 4.5 INJECTION, SOLUTION INTRAVENOUS at 07:12

## 2021-01-01 RX ADMIN — CEFTRIAXONE SODIUM 2 G: 2 INJECTION, POWDER, FOR SOLUTION INTRAMUSCULAR; INTRAVENOUS at 01:12

## 2021-01-01 RX ADMIN — CALCIUM CHLORIDE 1 G: 100 INJECTION, SOLUTION INTRAVENOUS at 09:12

## 2021-01-01 RX ADMIN — PROPOFOL 46.73 MCG/KG/MIN: 10 INJECTION, EMULSION INTRAVENOUS at 03:12

## 2021-01-01 RX ADMIN — PROPOFOL 41.44 MCG/KG/MIN: 10 INJECTION, EMULSION INTRAVENOUS at 04:12

## 2021-01-01 RX ADMIN — SODIUM CHLORIDE: 9 INJECTION, SOLUTION INTRAVENOUS at 01:12

## 2021-01-01 RX ADMIN — NOREPINEPHRINE BITARTRATE 0.36 MCG/KG/MIN: 1 INJECTION, SOLUTION, CONCENTRATE INTRAVENOUS at 03:12

## 2021-01-01 RX ADMIN — MEROPENEM 1 G: 1 INJECTION, POWDER, FOR SOLUTION INTRAVENOUS at 09:12

## 2021-01-01 RX ADMIN — PANTOPRAZOLE SODIUM 40 MG: 40 INJECTION, POWDER, FOR SOLUTION INTRAVENOUS at 02:12

## 2021-01-01 RX ADMIN — GENTAMICIN SULFATE 170 MG: 40 INJECTION, SOLUTION INTRAMUSCULAR; INTRAVENOUS at 04:12

## 2021-01-01 RX ADMIN — PHENYLEPHRINE HYDROCHLORIDE 200 MCG: 10 INJECTION INTRAVENOUS at 10:12

## 2021-01-01 RX ADMIN — FENTANYL CITRATE 50 MCG: 50 INJECTION INTRAMUSCULAR; INTRAVENOUS at 01:12

## 2021-01-01 RX ADMIN — PROPOFOL 45.64 MCG/KG/MIN: 10 INJECTION, EMULSION INTRAVENOUS at 06:12

## 2021-01-01 RX ADMIN — NOREPINEPHRINE BITARTRATE 0.12 MCG/KG/MIN: 1 INJECTION, SOLUTION, CONCENTRATE INTRAVENOUS at 03:12

## 2021-01-01 RX ADMIN — PROPOFOL 45.64 MCG/KG/MIN: 10 INJECTION, EMULSION INTRAVENOUS at 10:12

## 2021-01-01 RX ADMIN — ETOMIDATE 10 MG: 2 INJECTION, SOLUTION INTRAVENOUS at 10:12

## 2021-01-01 RX ADMIN — FENTANYL CITRATE 100 MCG/HR: 50 INJECTION, SOLUTION INTRAMUSCULAR; INTRAVENOUS at 02:12

## 2021-01-01 RX ADMIN — INSULIN ASPART 8 UNITS: 100 INJECTION, SOLUTION INTRAVENOUS; SUBCUTANEOUS at 11:12

## 2021-01-01 RX ADMIN — PROPOFOL 46.73 MCG/KG/MIN: 10 INJECTION, EMULSION INTRAVENOUS at 08:12

## 2021-01-01 RX ADMIN — CALCIUM GLUCONATE 1 G: 98 INJECTION, SOLUTION INTRAVENOUS at 11:12

## 2021-01-01 RX ADMIN — INSULIN ASPART 1 UNITS: 100 INJECTION, SOLUTION INTRAVENOUS; SUBCUTANEOUS at 01:12

## 2021-01-01 RX ADMIN — PANTOPRAZOLE SODIUM 40 MG: 40 GRANULE, DELAYED RELEASE ORAL at 09:12

## 2021-01-01 RX ADMIN — PROPOFOL 25 MCG/KG/MIN: 10 INJECTION, EMULSION INTRAVENOUS at 11:12

## 2021-01-01 RX ADMIN — INSULIN ASPART 4 UNITS: 100 INJECTION, SOLUTION INTRAVENOUS; SUBCUTANEOUS at 10:12

## 2021-01-01 RX ADMIN — AMPICILLIN SODIUM 2 G: 2 INJECTION, POWDER, FOR SOLUTION INTRAMUSCULAR; INTRAVENOUS at 06:12

## 2021-01-01 RX ADMIN — INSULIN ASPART 6 UNITS: 100 INJECTION, SOLUTION INTRAVENOUS; SUBCUTANEOUS at 09:12

## 2021-01-01 RX ADMIN — CALCIUM GLUCONATE 4 G: 98 INJECTION, SOLUTION INTRAVENOUS at 08:12

## 2021-01-01 RX ADMIN — PROPOFOL 25.92 MCG/KG/MIN: 10 INJECTION, EMULSION INTRAVENOUS at 10:12

## 2021-01-01 RX ADMIN — KETAMINE HYDROCHLORIDE 10 MG: 50 INJECTION INTRAMUSCULAR; INTRAVENOUS at 01:12

## 2021-01-01 RX ADMIN — SODIUM CHLORIDE: 4.5 INJECTION, SOLUTION INTRAVENOUS at 04:12

## 2021-01-01 RX ADMIN — PANTOPRAZOLE SODIUM 40 MG: 40 GRANULE, DELAYED RELEASE ORAL at 10:12

## 2021-01-01 RX ADMIN — PROPOFOL 45.64 MCG/KG/MIN: 10 INJECTION, EMULSION INTRAVENOUS at 07:12

## 2021-01-01 RX ADMIN — NOREPINEPHRINE BITARTRATE 0.38 MCG/KG/MIN: 1 INJECTION, SOLUTION, CONCENTRATE INTRAVENOUS at 05:12

## 2021-01-01 RX ADMIN — FENTANYL CITRATE 100 MCG: 50 INJECTION INTRAMUSCULAR; INTRAVENOUS at 09:12

## 2021-01-01 RX ADMIN — MEROPENEM 500 MG: 500 INJECTION, POWDER, FOR SOLUTION INTRAVENOUS at 09:12

## 2021-01-01 RX ADMIN — METOPROLOL TARTRATE 25 MG: 25 TABLET, FILM COATED ORAL at 08:12

## 2021-01-01 RX ADMIN — CALCIUM GLUCONATE 2 G: 98 INJECTION, SOLUTION INTRAVENOUS at 07:12

## 2021-01-01 RX ADMIN — FENTANYL CITRATE 150 MCG/HR: 50 INJECTION, SOLUTION INTRAMUSCULAR; INTRAVENOUS at 07:12

## 2021-01-01 RX ADMIN — ONDANSETRON 8 MG: 2 INJECTION INTRAMUSCULAR; INTRAVENOUS at 10:12

## 2021-01-01 RX ADMIN — INSULIN ASPART 6 UNITS: 100 INJECTION, SOLUTION INTRAVENOUS; SUBCUTANEOUS at 06:12

## 2021-01-01 RX ADMIN — CALCIUM GLUCONATE 6 G: 98 INJECTION, SOLUTION INTRAVENOUS at 08:12

## 2021-01-01 RX ADMIN — INSULIN ASPART 1 UNITS: 100 INJECTION, SOLUTION INTRAVENOUS; SUBCUTANEOUS at 08:12

## 2021-01-01 RX ADMIN — NOREPINEPHRINE BITARTRATE 0.02 MCG/KG/MIN: 1 INJECTION, SOLUTION, CONCENTRATE INTRAVENOUS at 12:12

## 2021-01-01 RX ADMIN — DAPTOMYCIN 500 MG: 500 INJECTION, POWDER, LYOPHILIZED, FOR SOLUTION INTRAVENOUS at 02:12

## 2021-01-01 RX ADMIN — PROPOFOL 45 MCG/KG/MIN: 10 INJECTION, EMULSION INTRAVENOUS at 10:12

## 2021-01-01 RX ADMIN — NOREPINEPHRINE BITARTRATE 0.38 MCG/KG/MIN: 1 INJECTION, SOLUTION, CONCENTRATE INTRAVENOUS at 09:12

## 2021-01-01 RX ADMIN — HEPARIN SODIUM 4000 UNITS: 1000 INJECTION INTRAVENOUS; SUBCUTANEOUS at 07:12

## 2021-01-01 RX ADMIN — CLINDAMYCIN PHOSPHATE 900 MG: 900 INJECTION, SOLUTION INTRAVENOUS at 06:12

## 2021-01-01 RX ADMIN — FENTANYL CITRATE 200 MCG/HR: 50 INJECTION, SOLUTION INTRAMUSCULAR; INTRAVENOUS at 12:12

## 2021-01-01 RX ADMIN — INSULIN ASPART 4 UNITS: 100 INJECTION, SOLUTION INTRAVENOUS; SUBCUTANEOUS at 01:12

## 2021-01-01 RX ADMIN — ONDANSETRON 4 MG: 2 INJECTION INTRAMUSCULAR; INTRAVENOUS at 08:12

## 2021-01-01 RX ADMIN — MIDAZOLAM 2 MG: 1 INJECTION INTRAMUSCULAR; INTRAVENOUS at 01:12

## 2021-01-01 RX ADMIN — HEPARIN SODIUM 4000 UNITS: 1000 INJECTION INTRAVENOUS; SUBCUTANEOUS at 11:12

## 2021-01-01 RX ADMIN — PROPOFOL 25 MCG/KG/MIN: 10 INJECTION, EMULSION INTRAVENOUS at 06:12

## 2021-01-01 RX ADMIN — INSULIN ASPART 8 UNITS: 100 INJECTION, SOLUTION INTRAVENOUS; SUBCUTANEOUS at 12:12

## 2021-01-01 RX ADMIN — PROPOFOL 5 MCG/KG/MIN: 10 INJECTION, EMULSION INTRAVENOUS at 04:12

## 2021-01-01 RX ADMIN — CEFAZOLIN 2 G: 1 INJECTION, POWDER, FOR SOLUTION INTRAMUSCULAR; INTRAVENOUS; PARENTERAL at 09:12

## 2021-01-01 RX ADMIN — PROPOFOL 46.73 MCG/KG/MIN: 10 INJECTION, EMULSION INTRAVENOUS at 02:12

## 2021-01-01 RX ADMIN — INSULIN ASPART 4 UNITS: 100 INJECTION, SOLUTION INTRAVENOUS; SUBCUTANEOUS at 06:12

## 2021-01-01 RX ADMIN — CALCIUM GLUCONATE 4 G: 98 INJECTION, SOLUTION INTRAVENOUS at 11:12

## 2021-01-01 RX ADMIN — FENTANYL CITRATE 100 MCG: 50 INJECTION INTRAMUSCULAR; INTRAVENOUS at 11:12

## 2021-01-01 RX ADMIN — PROPOFOL 25 MCG/KG/MIN: 10 INJECTION, EMULSION INTRAVENOUS at 03:12

## 2021-01-01 RX ADMIN — HUMAN INSULIN 10 UNITS: 100 INJECTION, SOLUTION SUBCUTANEOUS at 09:12

## 2021-01-01 RX ADMIN — PROPOFOL 35.92 MCG/KG/MIN: 10 INJECTION, EMULSION INTRAVENOUS at 11:12

## 2021-01-01 RX ADMIN — SODIUM CHLORIDE 1000 ML: 9 INJECTION, SOLUTION INTRAVENOUS at 06:12

## 2021-01-01 RX ADMIN — INSULIN ASPART 6 UNITS: 100 INJECTION, SOLUTION INTRAVENOUS; SUBCUTANEOUS at 04:12

## 2021-01-01 RX ADMIN — HEPARIN SODIUM 5000 UNITS: 5000 INJECTION INTRAVENOUS; SUBCUTANEOUS at 03:12

## 2021-01-01 RX ADMIN — MEROPENEM 1 G: 1 INJECTION, POWDER, FOR SOLUTION INTRAVENOUS at 12:12

## 2021-01-01 RX ADMIN — NOREPINEPHRINE BITARTRATE 0.38 MCG/KG/MIN: 1 INJECTION, SOLUTION, CONCENTRATE INTRAVENOUS at 10:12

## 2021-01-01 RX ADMIN — FENTANYL CITRATE 200 MCG/HR: 50 INJECTION, SOLUTION INTRAMUSCULAR; INTRAVENOUS at 02:12

## 2021-01-01 RX ADMIN — CALCIUM GLUCONATE 4 G: 98 INJECTION, SOLUTION INTRAVENOUS at 09:12

## 2021-01-01 RX ADMIN — PROPOFOL 41.26 MCG/KG/MIN: 10 INJECTION, EMULSION INTRAVENOUS at 12:12

## 2021-01-01 RX ADMIN — PROPOFOL 45 MCG/KG/MIN: 10 INJECTION, EMULSION INTRAVENOUS at 03:12

## 2021-01-01 RX ADMIN — SODIUM CHLORIDE 100 ML/HR: 9 INJECTION, SOLUTION INTRAVENOUS at 12:12

## 2021-01-01 RX ADMIN — DEXTROSE MONOHYDRATE 12.5 G: 25 INJECTION, SOLUTION INTRAVENOUS at 04:12

## 2021-01-01 RX ADMIN — PROPOFOL 40 MCG/KG/MIN: 10 INJECTION, EMULSION INTRAVENOUS at 11:12

## 2021-01-01 RX ADMIN — CLINDAMYCIN PHOSPHATE 900 MG: 900 INJECTION, SOLUTION INTRAVENOUS at 04:12

## 2021-09-02 PROBLEM — Z13.220 ENCOUNTER FOR SCREENING FOR LIPID DISORDER: Status: ACTIVE | Noted: 2021-01-01

## 2021-09-02 PROBLEM — I10 HYPERTENSION: Status: ACTIVE | Noted: 2021-01-01

## 2021-09-02 PROBLEM — I69.398 ABNORMALITY OF GAIT AS LATE EFFECT OF CEREBROVASCULAR ACCIDENT (CVA): Status: ACTIVE | Noted: 2021-01-01

## 2021-09-02 PROBLEM — R26.9 ABNORMALITY OF GAIT AS LATE EFFECT OF CEREBROVASCULAR ACCIDENT (CVA): Status: ACTIVE | Noted: 2021-01-01

## 2021-12-06 PROBLEM — Z13.220 ENCOUNTER FOR SCREENING FOR LIPID DISORDER: Status: RESOLVED | Noted: 2021-01-01 | Resolved: 2021-01-01

## 2021-12-13 PROBLEM — N49.3 FOURNIER GANGRENE: Status: ACTIVE | Noted: 2021-01-01

## 2021-12-13 NOTE — ED TRIAGE NOTES
Patient presents with right lower quadrant abdominal pain that started a few days ago. Patient was seen a couple days ago at [place] and then yesterday at [facility].

## 2021-12-14 PROBLEM — Z99.11 ON MECHANICALLY ASSISTED VENTILATION: Status: ACTIVE | Noted: 2021-01-01

## 2021-12-14 PROBLEM — N17.9 AKI (ACUTE KIDNEY INJURY): Status: ACTIVE | Noted: 2021-01-01

## 2021-12-14 NOTE — CONSULTS
[facility] [place] ICU  Nephrology  Consult Note    Patient Name: Og Garcia  MRN: [id number]  Admission Date: 12/13/2021  Hospital Length of Stay: 1 days  Attending Provider: Harish Cazares MD   Primary Care Physician: Hector Arndt DNP, FNP-C  Principal Problem:Raúl gangrene    Consults  Subjective:     HPI: History per the chart as the patient is intubated.  This patient presented with abdominal pain.  He subsequently had a CT of the abdomen which showed subcutaneous air.  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  Urine output has been minimal.  Serum creatinine has trended up to 7.3.  Serum potassium is 4.3.  Nephrology is consulted for renal issues.  The CT abdomen did not show renal abnormality.      Past Medical History:   Diagnosis Date    Hyperlipidemia     Hypertension        Past Surgical History:   Procedure Laterality Date    INCISION AND DRAINAGE OF ABSCESS N/A 12/13/2021    Procedure: INCISION AND DRAINAGE, ABSCESS PERINEAL REGION;  Surgeon: Harish Cazares MD;  Location: [facility];  Service: General;  Laterality: N/A;  perirectal       Review of patient's allergies indicates:  No Known Allergies  Current Facility-Administered Medications   Medication Frequency    0.45% NaCl infusion Continuous    acetaminophen tablet 500 mg Q6H PRN    clindamycin in D5W 900 mg/50 mL IVPB 900 mg Q8H    DAPTOmycin (CUBICIN) 500 mg in sodium chloride 0.9% IVPB Q48H    dextrose 50% injection 12.5 g PRN    fentaNYL 2500 mcg in D5W 250 mL infusion premix (titrating) (conc: 10 mcg/mL) Continuous    glucagon (human recombinant) injection 1 mg PRN    insulin aspart U-100 injection 1-10 Units Q6H PRN    meropenem (MERREM) 500 mg in sodium chloride 0.9 % 100 mL IVPB (MB+) Q12H    metoprolol tartrate (LOPRESSOR) tablet 25 mg BID    pantoprazole injection 40 mg Daily    propofol (DIPRIVAN) 10 mg/mL infusion Continuous     Family History     Problem Relation (Age of Onset)     Cancer Father    Diabetes Father    Hypertension Mother, Father        Tobacco Use    Smoking status: Current Every Day Smoker     Packs/day: 0.50     Types: Cigarettes    Smokeless tobacco: Never Used   Substance and Sexual Activity    Alcohol use: Not Currently    Drug use: Never    Sexual activity: Yes     Review of Systems   Unable to perform ROS: Intubated     Objective:     Vital Signs (Most Recent):  Temp: 99.7 °F (37.6 °C) (12/14/21 1515)  Pulse: (!) 117 (12/14/21 1615)  Resp: (!) 23 (12/14/21 1615)  BP: (!) 155/65 (12/14/21 1615)  SpO2: 97 % (12/14/21 1615)  O2 Device (Oxygen Therapy): ventilator (12/14/21 1700) Vital Signs (24h Range):  Temp:  [98.5 °F (36.9 °C)-101.7 °F (38.7 °C)] 99.7 °F (37.6 °C)  Pulse:  [] 117  Resp:  [12-39] 23  SpO2:  [92 %-100 %] 97 %  BP: (114-194)/(56-85) 155/65     Weight: 94.1 kg (207 lb 7.3 oz) (12/14/21 0349)  Body mass index is 28.93 kg/m².  Body surface area is 2.17 meters squared.    I/O last 3 completed shifts:  In: 2200 [IV Piggyback:2200]  Out: 250 [Urine:250]    Physical Exam  Vitals reviewed.   Constitutional:       Appearance: He is ill-appearing.   HENT:      Head:      Comments: Old wound to the right temporal area     Mouth/Throat:      Mouth: Mucous membranes are dry.   Cardiovascular:      Rate and Rhythm: Regular rhythm.   Pulmonary:      Effort: Pulmonary effort is normal.      Breath sounds: Normal breath sounds.      Comments: ventilated  Abdominal:      Palpations: Abdomen is soft.      Comments: Abdominal wound vac         Significant Labs:  BMP:   Recent Labs   Lab 12/14/21  1528   *   *   K 4.3   *   CO2 15*   BUN 78*   CREATININE 7.36*   CALCIUM 6.6*     CBC:   Recent Labs   Lab 12/14/21  1333   WBC 8.45   RBC 3.92*   HGB 11.7*   HCT 33.4*      MCV 85.2   MCH 29.8   MCHC 35.0       Significant Imaging:  Labs: Reviewed    Assessment/Plan:     * Denice gangrene  S/p wound debridement    RAHAT (acute kidney  injury)  Septic ATN  No renal recovery.   Low threshhold for dialysis support.  Ask for dialysis catheter  Hepatitis panel        Thank you for your consult. I will follow-up with patient. Please contact us if you have any additional questions.    Edwin Pryor Jr, MD  Nephrology  [facility]

## 2021-12-14 NOTE — ASSESSMENT & PLAN NOTE
Septic ATN  No renal recovery.   Low threshhold for dialysis support.  Ask for dialysis catheter  Hepatitis panel

## 2021-12-14 NOTE — PLAN OF CARE
Problem: Infection  Goal: Absence of Infection Signs and Symptoms  Outcome: Ongoing, Progressing  Intervention: Prevent or Manage Infection  Flowsheets (Taken 12/13/2021 2359)  Fever Reduction/Comfort Measures: lightweight clothing  Infection Management: aseptic technique maintained  Isolation Precautions: precautions maintained     Problem: Adult Inpatient Plan of Care  Goal: Plan of Care Review  Outcome: Ongoing, Progressing  Flowsheets (Taken 12/13/2021 2359)  Plan of Care Reviewed With: patient  Goal: Patient-Specific Goal (Individualized)  Outcome: Ongoing, Progressing  Goal: Absence of Hospital-Acquired Illness or Injury  Outcome: Ongoing, Progressing  Intervention: Identify and Manage Fall Risk  Flowsheets (Taken 12/13/2021 2359)  Safety Promotion/Fall Prevention:   assistive device/personal item within reach   side rails raised x 2   instructed to call staff for mobility  Intervention: Prevent Skin Injury  Flowsheets (Taken 12/13/2021 2359)  Body Position: turned  Skin Protection: tubing/devices free from skin contact  Goal: Optimal Comfort and Wellbeing  Outcome: Ongoing, Progressing  Goal: Readiness for Transition of Care  Outcome: Ongoing, Progressing

## 2021-12-14 NOTE — PROGRESS NOTES
Pharmacist Renal Dose Adjustment Note    Og Garcia is a 66 y.o. male being treated with the medication Merrem    Patient Data:    Vital Signs (Most Recent):  Temp: (!) 101.7 °F (38.7 °C) (12/14/21 1309)  Pulse: (!) 112 (12/14/21 1320)  Resp: 20 (12/14/21 1320)  BP: 132/75 (12/14/21 1320)  SpO2: 100 % (12/14/21 1320)   Vital Signs (72h Range):  Temp:  [98.5 °F (36.9 °C)-101.7 °F (38.7 °C)]   Pulse:  []   Resp:  [12-39]   BP: (114-192)/(56-85)   SpO2:  [92 %-100 %]      Recent Labs   Lab 12/13/21  1815 12/14/21  0016   CREATININE 5.42* 6.17*     Serum creatinine: 6.17 mg/dL (H) 12/14/21 0016  Estimated creatinine clearance: 13.8 mL/min (A)    Medication: Merrem dose: 1000 mg frequency: every 8 hours will be changed to medication: Merrem dose: 500 mg frequency: every 12 hours    Pharmacist's Name: Guanakito Newman  Pharmacist's Extension: [phone]

## 2021-12-14 NOTE — ASSESSMENT & PLAN NOTE
- s/p debridement  - wound vac in place  - ID consulted for antibiotic management  - trend lactate  - discuss with surgery enteral nutrition

## 2021-12-14 NOTE — H&P
[facility] - Emergency Department  General Surgery  History & Physical    Patient Name: Og Garcia  MRN: [id number]  Admission Date: 12/13/2021  Attending Physician: Harish Cazares MD   Primary Care Provider: Hector Arndt DNP, FNP-C    Patient information was obtained from patient and ER records.     Subjective:     Chief Complaint/Reason for Admission:  Abdominal pain    History of Present Illness:  Patient is a 66 y.o. male presents with 1 day history of lower abdominal pain and perirectal pain.  He has a very poorly controlled diabetic and has not been taking his diabetic medications for a few months now.  Has a history of multiple heart attacks is on Brilinta last heart attack was about 2 years ago.  CT scan showed air in the lower pelvis and striking from the perirectal area bilaterally.     No current facility-administered medications on file prior to encounter.     Current Outpatient Medications on File Prior to Encounter   Medication Sig    BRILINTA 90 mg tablet TAKE 1 TABLET BY MOUTH 2 TIMES A DAY    BRILINTA 90 mg tablet Take 1 tablet (90 mg total) by mouth 2 (two) times daily.    lisinopriL 10 MG tablet Take 1 tablet (10 mg total) by mouth once daily.    metoprolol tartrate (LOPRESSOR) 25 MG tablet TAKE 1 TABLET BY MOUTH 2 TIMES A DAY FOR BLOOD PRESSURE.    metoprolol tartrate (LOPRESSOR) 25 MG tablet Take 1 tablet (25 mg total) by mouth 2 (two) times daily.    [DISCONTINUED] BRILINTA 90 mg tablet Take 1 tablet (90 mg total) by mouth once daily.    [DISCONTINUED] BRILINTA 90 mg tablet Take 1 tablet (90 mg total) by mouth once daily.    [DISCONTINUED] lisinopriL 10 MG tablet Take 1 tablet (10 mg total) by mouth once daily.    [DISCONTINUED] lisinopriL 10 MG tablet Take 1 tablet (10 mg total) by mouth once daily.    [DISCONTINUED] metoprolol tartrate (LOPRESSOR) 25 MG tablet Take 25 mg by mouth 2 (two) times daily.    [DISCONTINUED] metoprolol tartrate (LOPRESSOR) 25 MG tablet  Take 1 tablet (25 mg total) by mouth 2 (two) times daily.       Review of patient's allergies indicates:  No Known Allergies    Past Medical History:   Diagnosis Date    Hyperlipidemia     Hypertension      History reviewed. No pertinent surgical history.  Family History     Problem Relation (Age of Onset)    Cancer Father    Diabetes Father    Hypertension Mother, Father        Tobacco Use    Smoking status: Current Every Day Smoker     Packs/day: 0.50     Types: Cigarettes    Smokeless tobacco: Never Used   Substance and Sexual Activity    Alcohol use: Not Currently    Drug use: Never    Sexual activity: Yes     Review of Systems   Constitutional: Positive for chills. Negative for activity change, appetite change, fatigue and fever.   HENT: Negative for trouble swallowing.    Respiratory: Negative for cough and shortness of breath.    Cardiovascular: Negative for chest pain and palpitations.   Gastrointestinal: Positive for abdominal pain and rectal pain. Negative for abdominal distention, blood in stool, constipation and diarrhea.   Genitourinary: Negative for flank pain.   Musculoskeletal: Negative for neck pain and neck stiffness.   Neurological: Positive for weakness.     Objective:     Vital Signs (Most Recent):  Temp: 98.9 °F (37.2 °C) (12/13/21 1751)  Pulse: 103 (12/13/21 2158)  Resp: (!) 39 (12/13/21 2158)  BP: (!) 160/85 (12/13/21 2158)  SpO2: (!) 93 % (12/13/21 2158) Vital Signs (24h Range):  Temp:  [98.9 °F (37.2 °C)] 98.9 °F (37.2 °C)  Pulse:  [101-109] 103  Resp:  [18-39] 39  SpO2:  [93 %-98 %] 93 %  BP: (130-161)/(69-85) 160/85     Weight: 90.7 kg (200 lb)  Body mass index is 27.89 kg/m².    Physical Exam  Constitutional:       General: He is not in acute distress.  HENT:      Head: Normocephalic.   Cardiovascular:      Rate and Rhythm: Normal rate and regular rhythm.      Pulses: Normal pulses.   Pulmonary:      Effort: Pulmonary effort is normal. No respiratory distress.      Breath sounds:  Normal breath sounds.   Abdominal:      General: Abdomen is flat. There is no distension.      Palpations: Abdomen is soft.      Tenderness: There is abdominal tenderness.   Genitourinary:     Comments: Indurated fluctuant area on the right gluteal area that is tender tracking towards the anterior aspect.  Musculoskeletal:         General: Normal range of motion.   Skin:     General: Skin is warm.   Neurological:      General: No focal deficit present.      Mental Status: He is oriented to person, place, and time.         Significant Labs:  I have reviewed all pertinent lab results within the past 24 hours.  CBC:   Recent Labs   Lab 12/13/21 1815   WBC 11.23*   RBC 4.34*   HGB 12.6*   HCT 37.6*      MCV 86.6   MCH 29.0   MCHC 33.5     BMP:   Recent Labs   Lab 12/13/21 1815   *      K 3.7      CO2 16*   BUN 58*   CREATININE 5.42*   CALCIUM 8.1*       Significant Diagnostics:  I have reviewed all pertinent imaging results/findings within the past 24 hours.  CT: I have reviewed all pertinent results/findings within the past 24 hours and my personal findings are:  CT scan showed air in the right gluteal area tracking into the pelvic area and abdominal area as well.    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  Risks and benefits explained to the patient and family including risk of bleeding, infection, recurrence, need for additional operations.  The understand he will go to the ICU and will likely need to go back for additional exploration.  All questions were answered.    Harish Cazares MD  General Surgery  [facility] - Emergency Department

## 2021-12-14 NOTE — OP NOTE
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    Procedure in Detail:  After informed consent patient brought to the OR prepped and draped in the usual sterile fashion.  Patient was placed lithotomy position induration was appreciated on the right lateral aspect of the perirectal area.  We opened this area up and got into very foul-smelling dead necrotic tissue.  We opened up this entire cavity and tracked superiorly for at least 15 cm.  Her entire hand fit up in this area we broke up all the loculations.  We debrided as much necrotic tissue as we could and opened up as much as possible.  Once we could go any further there was a mild amount of continued oozing due to the patient's Brilinta.  We packed with a Kerlix afterwards and dressed area.  Patient tolerated the procedure well.      Complications: No    Estimated Blood Loss (EBL): * No values recorded between 12/13/2021 11:08 AM and 12/13/2021 11:51 PM *           Implants: * No implants in log *    Specimens:   Specimen (24h ago, onward)            None                  Condition: Fair    Disposition: ICU - extubated and stable.    Attestation: I was present and scrubbed for the entire procedure.

## 2021-12-14 NOTE — PLAN OF CARE
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 No  Secondary Emergency Contact: maicolwilfredo  Work Phone: [phone]  Mobile Phone: [phone]  Relation: Daughter    Discharge Plan A: Skilled Nursing Facility  Discharge Plan B: Home with family,Home Health      [facility] [place] - [address] [facility]  [place] [address]  Phone: [phone] Fax: [phone]    [facility] [place] - [address] [place] [id number]  [address] [facility] [address]  Phone: [phone] Fax: [phone]      Initial Assessment (most recent)     Adult Discharge Assessment - 12/14/21 1414        Discharge Assessment    Assessment Type Discharge Planning Assessment     Source of Information patient;family     Communicated DOROTEO with patient/caregiver Date not available/Unable to determine     Lives With alone     Do you expect to return to your current living situation? Yes     Do you have help at home or someone to help you manage your care at home? Yes     Prior to hospitilization cognitive status: Unable to Assess     Walking or Climbing Stairs Difficulty none     Dressing/Bathing Difficulty none     Equipment Currently Used at Home none     Do you currently have service(s) that help you manage your care at home? No     Discharge Plan A Skilled Nursing Facility     Discharge Plan B Home with family;Home Health     DME Needed  Upon Discharge  none     Discharge Barriers Identified None                 ss spoke with pt's niece, [place] and she stated pt lives at home alone and plans to return if able. Niece possibly interested in swb at d/c. Pt gone for surgery at times of visit. Ss to f/u closer to d/c for final d/c decision. Following.

## 2021-12-14 NOTE — CONSULTS
[facility] ICU  Adult Nutrition  Consult Note         Reason for Assessment    Enteral Feeding ( awaiting surgery)  Nutrition Risk Screen: no indicators present  Malnutrition  Is Patient Malnourished: No  Skin Integrity  Clayton Risk Assessment  Sensory Perception: 3-->slightly limited  Moisture: 4-->rarely moist  Activity: 3-->walks occasionally  Mobility: 3-->slightly limited  Nutrition: 3-->adequate  Friction and Shear: 3-->no apparent problem  Clayton Score: 19  Comments on skin integrity: abdominal wound  Nutrition Diagnosis  Altered GI function   related to Compromised organ/organs related to G.I. function as evidenced by infection to abdomen    Nutrition Diagnosis Status: New     Inadequate oral intake   related to Decreased ability to consume sufficient energy as evidenced by mechanical ventilation    Nutrition Diagnosis Status: New      Comments: awaiting surgery to initiate feedings.  Nutrition Risk  Level of Risk/Frequency of Follow-up: high  Comments on nutrition risk: no nutrition with abdominal wounds as risk for poor nutrition   Recent Labs   Lab 12/14/21  0016 12/14/21  0459 12/14/21  1452   *  --   --    POCGLU  --    < > 148*    < > = values in this interval not displayed.     Comments on Glucose: elevated due to stressors  Nutrition Prescription / Recommendations  Recommendation/Intervention: Initiate EN as medically feasible  Goals: EN tolerance, weight maintenance, wound healing  Nutrition Goal Status: new  Communication of CHERIE Recs: reviewed with physician  Current Diet Order: NPO    Recommended Diet: Enteral Nutrition  Recommended Oral Supplement: No Oral Supplements  Is Nutrition Support Recommended: Yes   Needs Calculated  Energy Need Method: Santosh Bah Energy Calorie Requirements (kcal): 2090  Protein Requirements:   Enteral Nutrition   Enteral Nutrition Formula Provides:  2040 kcals  102 g Protein  357 g Carbohydrates  23 g Fat Propofol Rate: Yes Propofol Rate  5.6 ml/h Provides 16 g Fat  39 g Total Fat  1734 ml Fluid without Flush    300 ml Fluid by flush   2034 ml Total Fluid  Enteral Nutrition Recommended Order:  Tube feeding via NG/ Emanuel Sump  Tube feeding formula: Vivonex 1.0 Continuous 85 ml/h  Free Water Flush: 25 ml every 2 hours  Modular Supplements:No Modular Supplements needed  Enteral Nutrition meets needs?: yes  Enteral Nutrition Status: New Order    Is Education Recommended: No  Monitor and Evaluation  % current Intake: NPO  % intake to meet estimated needs: Enteral Nutrition   Food and Nutrient Intake: enteral nutrition intake  Food and Nutrient Adminstration: enteral and parenteral nutrition administration  Anthropometric Measurements: weight change  Biochemical Data, Medical Tests and Procedures: electrolyte and renal panel,glucose/endocrine profile  Nutrition-Focused Physical Findings: overall appearance,skin  Energy Calories Required: not meeting needs  Protein Required: not meeting needs  Fluid Required: not meeting needs  Current Medical Diagnosis and Past Medical History     Past Medical History:   Diagnosis Date    Hyperlipidemia     Hypertension      Nutrition/Diet History  Spiritual, Cultural Beliefs, Denominational Practices, Values that Affect Care: no  Lab/Procedures/Meds  Recent Labs   Lab 12/13/21  1815 12/13/21  2353 12/14/21  0016 12/14/21  0459 12/14/21  1452      < > 146*  --   --    K 3.7   < > 3.9  --   --    BUN 58*   < > 66*  --   --    CREATININE 5.42*   < > 6.17*  --   --    *   < > 251*  --   --    POCGLU  --    < >  --    < > 148*   CALCIUM 8.1*   < > 7.5*  --   --    ALBUMIN 2.5*  --   --   --   --       < > 110*  --   --    ALT 83*  --   --   --   --    *  --   --   --   --     < > = values in this interval not displayed.     Last A1c: No results found for: HGBA1C  Lab Results   Component Value Date    RBC 3.92 (L) 12/14/2021    HGB 11.7 (L) 12/14/2021    HCT 33.4 (L) 12/14/2021    MCV 85.2 12/14/2021     "[facility] 29.8 12/14/2021    [facility] 35.0 12/14/2021        Anthropometrics  Temp: 99.7 °F (37.6 °C)  Height Method: Stated  Height: 5' 11" (180.3 cm)  Height (inches): 71 in  Weight Method: Bed Scale  Weight: 94.1 kg (207 lb 7.3 oz)  Weight (lb): 207.45 lb  Ideal Body Weight (IBW), Male: 172 lb  % Ideal Body Weight, Male (lb): 118.05 %  BMI (Calculated): 28.9     Estimated/Assessed Needs  RMR ([place]-St. Jeor Equation): 1743.13 Activity Factor: 1.2 Injury Factor: 1     Temp: 99.7 °F (37.6 °C)Axillary  Weight Used For Calorie Calculations: 94 kg (207 lb 3.7 oz)   Energy Need Method: [place]-St Jeor Energy Calorie Requirements (kcal): 2090  Weight Used For Protein Calculations: 94 kg (207 lb 3.7 oz)  Protein Requirements:   Estimated Fluid Requirement Method: RDA Method    RDA Method (mL): 2090     Nutrition by Nursing              Last Bowel Movement: 12/12/21              Nutrition Follow-Up  RD Follow-up?: Yes  Assessment and Plan  No new Assessment & Plan notes have been filed under this hospital service since the last note was generated.  Service: Nutrition     "

## 2021-12-14 NOTE — CONSULTS
INFECTIOUS DISEASE CONSULT NOTE   Admit Date: 12/13/2021  CONSULT FOR : Necrotizing fasciitis  Chief Complaint:  Denice gangrene    HPI:      History obtained from review of records as patient was sedated on the vent. He presented to hospital last night with abd pain and purulent drainage from perirectal area.  Was found to have air in abd wall tissues on CT and taken to the OR where he was found to have necrotizing fasciitis with pus tracking along fascial planes of pelvis/ abd. I am asked to assist with antibiotics.  He has been febrile to over 101.  His urine out put has been poor.    ROS:      Unable to obtain as patient sedated on the vent.    PMHx:      Past Medical History:   Diagnosis Date    Hyperlipidemia     Hypertension         PMSx:      Past Surgical History:   Procedure Laterality Date    INCISION AND DRAINAGE OF ABSCESS N/A 12/13/2021    Procedure: INCISION AND DRAINAGE, ABSCESS PERINEAL REGION;  Surgeon: Harish Cazares MD;  Location: [facility];  Service: General;  Laterality: N/A;  perirectal        Social Hx:      Social History     Socioeconomic History    Marital status: Single   Tobacco Use    Smoking status: Current Every Day Smoker     Packs/day: 0.50     Types: Cigarettes    Smokeless tobacco: Never Used   Substance and Sexual Activity    Alcohol use: Not Currently    Drug use: Never    Sexual activity: Yes        Family Hx:      Family History   Problem Relation Age of Onset    Hypertension Mother     Hypertension Father     Diabetes Father     Cancer Father     `  Review of patient's allergies indicates:  No Known Allergies    Medications:      Current Facility-Administered Medications   Medication    0.9%  NaCl infusion    acetaminophen tablet 500 mg    clindamycin in D5W 900 mg/50 mL IVPB 900 mg    dextrose 50% injection 12.5 g    dextrose 50% injection 25 g    glucagon (human recombinant) injection 1 mg    HYDROcodone-acetaminophen 7.5-325 mg  per tablet 1 tablet    insulin aspart U-100 injection 1-10 Units    meropenem (MERREM) 1 g in sodium chloride 0.9 % 100 mL IVPB (MB+)    morphine injection 4 mg       VITALS:      Vital Signs Range (Last 24H):  Temp:  [98.5 °F (36.9 °C)-101.7 °F (38.7 °C)]   Pulse:  []   Resp:  [12-39]   BP: (114-192)/(56-85)   SpO2:  [92 %-100 %]     I & O (Last 24H):    Intake/Output Summary (Last 24 hours) at 12/14/2021 1330  Last data filed at 12/14/2021 1235  Gross per 24 hour   Intake 3575 ml   Output 365 ml   Net 3210 ml       Physical Exam   Constitutional: Pt is sedated on the vent.. Appears well-developed and overweight.   Head: Normocephalic and atraumatic.   Eyes, nose and throat:  Pale moist mucosa, anicteric and acyanotic, ETT in situ  Neck: Neck supple, no cervical lymphadenopathy, no thyroid gland enlargement   Cardiovascular: Normal rate and regular rhythm.  S1 and S2 appreciated by ascultation, no murmurs  Pulmonary/Chest: Effort normal, no crepitations or wheezes auscultated   Abdomen:  woundVAC in situ to midline abd wound, not distended, absent bowel sounds.   Extremities: No edema. No clubbing or cyanosis  Skin: Warm and dry. No rashes.    Laboratory Data:  Reviewed and noted in plan where applicable- Please see chart for full laboratory data.    No results for input(s): CPK, CPKMB, TROPONINI, MB in the last 24 hours. No results for input(s): POCTGLUCOSE in the last 24 hours.     No results found for: INR, PROTIME    Lab Results   Component Value Date    WBC 11.09 (H) 12/14/2021    HGB 10.7 (L) 12/14/2021    HCT 30.3 (L) 12/14/2021    MCV 83.0 12/14/2021     12/14/2021       BMP  Recent Labs   Lab 12/14/21  0016   *   *   K 3.9   *   CO2 17*   BUN 66*   CREATININE 6.17*   CALCIUM 7.5*     Microbiology Results (last 7 days)     Procedure Component Value Units Date/Time    Culture, Wound [[id number]] Collected: 12/14/21 1243    Order Status: Sent Specimen: Wound Updated: 12/14/21  1243    Culture, Anaerobe [[id number]] Collected: 12/14/21 1242    Order Status: Sent Specimen: Abscess from Abdomen Updated: 12/14/21 1242    Urine culture [[id number]] Collected: 12/13/21 2133    Order Status: Completed Specimen: Urine Updated: 12/14/21 0913     Culture, Urine No Growth To Date    Culture, Anaerobe [[id number]] Collected: 12/13/21 2315    Order Status: Sent Specimen: Abscess from Perineum Updated: 12/13/21 2359    Culture, Wound [[id number]] Collected: 12/13/21 2315    Order Status: Sent Specimen: Abscess from Perineum Updated: 12/13/21 2358    Blood culture #1 **CANNOT BE ORDERED STAT** [[id number]] Collected: 12/13/21 2112    Order Status: Resulted Specimen: Blood Updated: 12/13/21 2119    Blood culture #2 **CANNOT BE ORDERED STAT** [[id number]] Collected: 12/13/21 2117    Order Status: Resulted Specimen: Blood Updated: 12/13/21 2119            Radiology:  Reviewed and noted in plan where applicable-CT abd report reviewed      ASSESSMENT/PLAN:     ACTIVE PROBLEMS:    Patient Active Problem List   Diagnosis    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 Necrotizing fasciitis involving perineum and extending up into pelvis  2. Acute renal failure, worsening since admission  3. DM, likely uncontrolled      PLAN:  1. Agree with empiric meropenem  2. Add high dose clindamycin 900mg IV q8h  3. Add daptomycin, renally dosed at 6mg/kg q48h  4. Monitor CPK on daptomycin  5. F/U pending cultures and de-escalate antibiotic Tx as able    Thank you very much for the consult.  Will follow.      Discussed with other specialties - YES, d/w Dr Cazares

## 2021-12-14 NOTE — SUBJECTIVE & OBJECTIVE
Past Medical History:   Diagnosis Date    Hyperlipidemia     Hypertension        Past Surgical History:   Procedure Laterality Date    INCISION AND DRAINAGE OF ABSCESS N/A 12/13/2021    Procedure: INCISION AND DRAINAGE, ABSCESS PERINEAL REGION;  Surgeon: Harish Cazares MD;  Location: [facility] [place];  Service: General;  Laterality: N/A;  perirectal       Review of patient's allergies indicates:  No Known Allergies    Family History     Problem Relation (Age of Onset)    Cancer Father    Diabetes Father    Hypertension Mother, Father        Tobacco Use    Smoking status: Current Every Day Smoker     Packs/day: 0.50     Types: Cigarettes    Smokeless tobacco: Never Used   Substance and Sexual Activity    Alcohol use: Not Currently    Drug use: Never    Sexual activity: Yes         Review of Systems   Unable to perform ROS: Intubated     Objective:     Vital Signs (Most Recent):  Temp: 99.7 °F (37.6 °C) (12/14/21 1515)  Pulse: (!) 119 (12/14/21 1545)  Resp: (!) 21 (12/14/21 1545)  BP: (!) 179/68 (12/14/21 1545)  SpO2: 96 % (12/14/21 1545) Vital Signs (24h Range):  Temp:  [98.5 °F (36.9 °C)-101.7 °F (38.7 °C)] 99.7 °F (37.6 °C)  Pulse:  [] 119  Resp:  [12-39] 21  SpO2:  [92 %-100 %] 96 %  BP: (114-192)/(56-85) 179/68     Weight: 94.1 kg (207 lb 7.3 oz)  Body mass index is 28.93 kg/m².      Intake/Output Summary (Last 24 hours) at 12/14/2021 1548  Last data filed at 12/14/2021 1500  Gross per 24 hour   Intake 3955 ml   Output 515 ml   Net 3440 ml       Physical Exam  Vitals reviewed.   Constitutional:       Appearance: He is ill-appearing.      Comments: Intubated and sedated   HENT:      Head: Atraumatic.      Comments: Right temporal deformity     Nose: Nose normal.      Mouth/Throat:      Mouth: Mucous membranes are moist.   Eyes:      Conjunctiva/sclera: Conjunctivae normal.      Pupils: Pupils are equal, round, and reactive to light.   Cardiovascular:      Rate and Rhythm: Regular rhythm. Tachycardia  present.      Pulses: Normal pulses.      Heart sounds: Normal heart sounds.   Pulmonary:      Effort: No respiratory distress.      Breath sounds: No stridor. No wheezing, rhonchi or rales.   Abdominal:      Palpations: Abdomen is soft.      Tenderness: There is no guarding.      Comments: Anterior abdominal wall with wound vac in place   Musculoskeletal:         General: No swelling or deformity.      Cervical back: Neck supple.      Right lower leg: No edema.      Left lower leg: No edema.   Lymphadenopathy:      Cervical: No cervical adenopathy.   Skin:     General: Skin is warm and dry.   Neurological:      General: No focal deficit present.      Cranial Nerves: No cranial nerve deficit.         Vents:  Oxygen Concentration (%): 40 (12/14/21 1515)    Lines/Drains/Airways     Drain                 NG/OG Tube Lander sump 18 Fr. Left nostril -- days         Open Drain 12/14/21 1220 Inferior;Midline Abdomen Penrose 1/2 inch <1 day         Urethral Catheter 12/13/21 2130 16 Fr. <1 day          Airway                 Airway - Non-Surgical 12/14/21 1047 <1 day          Peripheral Intravenous Line                 Peripheral IV - Single Lumen 18 G Left;Posterior Wrist -- days         Peripheral IV - Single Lumen 12/13/21 1812 20 G Right Wrist <1 day                Significant Labs:    CBC/Anemia Profile:  Recent Labs   Lab 12/13/21 1815 12/14/21  0016 12/14/21  1333   WBC 11.23* 11.09* 8.45   HGB 12.6* 10.7* 11.7*   HCT 37.6* 30.3* 33.4*    208 184   MCV 86.6 83.0 85.2   RDW 13.8 13.3 14.3        Chemistries:  Recent Labs   Lab 12/13/21 1815 12/14/21  0016    146*   K 3.7 3.9    110*   CO2 16* 17*   BUN 58* 66*   CREATININE 5.42* 6.17*   CALCIUM 8.1* 7.5*   ALBUMIN 2.5*  --    PROT 8.1  --    BILITOT 0.5  --    ALKPHOS 69  --    ALT 83*  --    *  --        All pertinent labs within the past 24 hours have been reviewed.    Significant Imaging:   I have reviewed all pertinent imaging  results/findings within the past 24 hours.
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
## 2021-12-14 NOTE — INTERVAL H&P NOTE
The patient has been examined and the H&P has been reviewed:    I concur with the findings and no changes have occurred since H&P was written.    Surgery risks, benefits and alternative options discussed and understood by patient/family.      Patient still with lower abdominal pain and drainage from perirectal area.  Will need to go to OR for exploratory laparotomy and colostomy for continued control of this infection. Risks and benefits explained to patient and family.  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

## 2021-12-14 NOTE — HPI
History per the chart as the patient is intubated.  This patient presented with abdominal pain.  He subsequently had a CT of the abdomen which showed subcutaneous air.  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  Urine output has been minimal.  Serum creatinine has trended up to 7.3.  Serum potassium is 4.3.  Nephrology is consulted for renal issues.  The CT abdomen did not show renal abnormality.

## 2021-12-14 NOTE — OP NOTE
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  Perineal abscess drainage    Significant Findings of the Procedure:  Purulent abscess tracking along the rectus sheath and in long the pelvic soft tissue fascial planes towards the retroperitoneum as well.    Procedure in Detail:  After informed consent patient brought to the OR prepped and draped in the usual sterile fashion.  We started off by doing a lower midline incision and entering abdominal cavity.  While incising the rectus sheath we could appreciate purulence coming from the actual rectus sheath.  We opened the rectus sheath and tracking within the rectus sheath along the rectus muscles was gross purulence and easy dissection of the fascial planes.  Cultures were taken.  We then dissected these areas out and broke up all the loculations as the would area of infection tracked superiorly almost to above the umbilical level bilaterally.  The right side was slightly higher than the left side.  This tracked laterally as well on bilateral aspects towards the fascial planes between the internal and external obliques.  This also tracked deep in the pelvis area circumferentially anteriorly.  We could appreciate the pelvic bones all in this area which we dissected out.  Dr. Garcia came in to assist with the surgery and he we were able to connect the perirectal abscess towards this intra-abdominal infection.  We then used a 1 in Penrose drain and connected both her incisions to  allow for proper drainage.  We then irrigated all these areas with copious amounts irrigation.  We then placed an Abthera vac system within the abdomen to allow for better drainage.  At that point we placed a Kerlix in the rectal area.  Area was dressed and patient tolerated the procedure in guarded condition.  Complications: No    Estimated Blood Loss (EBL): * No values recorded between 12/14/2021 11:22 AM and 12/14/2021 12:58 PM *           Implants: * No implants in log *    Specimens:   Specimen (24h ago, onward)            None                  Condition: Serious    Disposition: ICU - intubated and critically ill.    Attestation: I was present and scrubbed for the entire procedure.

## 2021-12-14 NOTE — ED PROVIDER NOTES
Encounter Date: 12/13/2021       History     Chief Complaint   Patient presents with    Abdominal Pain     Patient presents to ER with complaint of abdominal pain.  Patient is brought to ER by EMS.  He states his symptoms started yesterday.  He states he has been seen at [facility] several times over the last week.  He denies fever.  Denies constipation. Denies dysuria, but states he has not been urinating as much. He admits to HTN and DM.  He states he has been taking medication for his HTN but has not taken his insulin in some time. Denies nausea, vomiting, or diarrhea.  States last bowel movement was yesterday.     The history is provided by the patient. No  was used.     Review of patient's allergies indicates:  No Known Allergies  Past Medical History:   Diagnosis Date    Hyperlipidemia     Hypertension      History reviewed. No pertinent surgical history.  Family History   Problem Relation Age of Onset    Hypertension Mother     Hypertension Father     Diabetes Father     Cancer Father      Social History     Tobacco Use    Smoking status: Current Every Day Smoker     Packs/day: 0.50     Types: Cigarettes    Smokeless tobacco: Never Used   Substance Use Topics    Alcohol use: Not Currently    Drug use: Never     Review of Systems   Constitutional: Positive for appetite change, chills and fatigue.   Gastrointestinal: Positive for abdominal pain.   Neurological: Positive for weakness.   All other systems reviewed and are negative.      Physical Exam     Initial Vitals [12/13/21 1751]   BP Pulse Resp Temp SpO2   (!) 141/72 109 18 98.9 °F (37.2 °C) 98 %      MAP       --         Physical Exam    Nursing note and vitals reviewed.  Constitutional: Vital signs are normal. He appears well-developed and well-nourished. He has a sickly appearance. He appears ill. He appears distressed.   HENT:   Head: Normocephalic.   Right Ear: Hearing, tympanic membrane, external ear and ear canal  normal.   Left Ear: Hearing, tympanic membrane, external ear and ear canal normal.   Nose: Nose normal.   Mouth/Throat: Uvula is midline. Mucous membranes are pale and dry.   Eyes: Conjunctivae and EOM are normal. Pupils are equal, round, and reactive to light.   Neck: Neck supple.   Normal range of motion.  Cardiovascular: Normal rate, regular rhythm, normal heart sounds and intact distal pulses.   Pulmonary/Chest: Breath sounds normal.   Abdominal: Abdomen is soft and protuberant. He exhibits distension. Bowel sounds are absent. There is abdominal tenderness in the right lower quadrant and suprapubic area. There is guarding.   Genitourinary: Rectum:      Guaiac result positive.   Guaiac positive stool. : Acceptable.   Genitourinary Comments: Internal and external hemorrhoids noted.  Scrotal area tender to palpation but no abnormalities noted.   Groin/perineal area without abscess or lesions.        Musculoskeletal:         General: Normal range of motion.      Cervical back: Normal range of motion and neck supple.     Neurological: He is alert and oriented to person, place, and time. He has normal strength. GCS score is 15. GCS eye subscore is 4. GCS verbal subscore is 5. GCS motor subscore is 6.   Skin: Skin is warm and dry. Capillary refill takes less than 2 seconds.   Psychiatric: He has a normal mood and affect. His behavior is normal. Judgment and thought content normal.         Medical Screening Exam   See Full Note    ED Course   Procedures  Labs Reviewed   COMPREHENSIVE METABOLIC PANEL - Abnormal; Notable for the following components:       Result Value    CO2 16 (*)     Anion Gap 25 (*)     Glucose 296 (*)     BUN 58 (*)     Creatinine 5.42 (*)     Calcium 8.1 (*)     Albumin 2.5 (*)     Globulin 5.6 (*)     ALT 83 (*)      (*)     eGFR  14 (*)     All other components within normal limits   LIPASE - Abnormal; Notable for the following components:    Lipase 36 (*)      All other components within normal limits   CBC WITH DIFFERENTIAL - Abnormal; Notable for the following components:    WBC 11.23 (*)     RBC 4.34 (*)     Hemoglobin 12.6 (*)     Hematocrit 37.6 (*)     Neutrophils % 85.7 (*)     Lymphocytes % 4.5 (*)     Monocytes % 8.2 (*)     Eosinophils % 0.0 (*)     Immature Granulocytes % 0.8 (*)     Neutrophils, Abs 9.62 (*)     Lymphocytes, Absolute 0.51 (*)     Monocytes, Absolute 0.92 (*)     Immature Granulocytes, Absolute 0.09 (*)     All other components within normal limits   MANUAL DIFFERENTIAL - Abnormal; Notable for the following components:    Bands, Man % 24 (*)     Lymphocytes, Man % 12 (*)     Monocytes, Man % 8 (*)     All other components within normal limits   AMYLASE - Normal   CULTURE, BLOOD   CULTURE, BLOOD   CBC W/ AUTO DIFFERENTIAL    Narrative:     The following orders were created for panel order CBC auto differential.  Procedure                               Abnormality         Status                     ---------                               -----------         ------                     CBC with Differential[[id number]]        Abnormal            Final result               Manual Differential[[id number]]          Abnormal            Final result                 Please view results for these tests on the individual orders.   URINALYSIS, REFLEX TO URINE CULTURE   EXTRA TUBES    Narrative:     The following orders were created for panel order EXTRA TUBES.  Procedure                               Abnormality         Status                     ---------                               -----------         ------                     Light Blue Top Hold[[id number]]                              In process                   Please view results for these tests on the individual orders.   LIGHT BLUE TOP HOLD   LACTIC ACID, PLASMA   EXTRA TUBES    Narrative:     The following orders were created for panel order EXTRA TUBES.  Procedure                                Abnormality         Status                     ---------                               -----------         ------                     Light Green Top Hold[[id number]]                             In process                   Please view results for these tests on the individual orders.   LIGHT GREEN TOP HOLD   SARS-COV2 (COVID) W/ FLU ANTIGEN   POCT OCCULT BLOOD (STOOL)          Imaging Results          CT Abdomen Pelvis  Without Contrast (Final result)  Result time 12/13/21 21:02:52    Final result by Efe Ferreira MD (12/13/21 21:02:52)                 Impression:      Indeterminate etiology of subcutaneous air that extends either from more to the perineal region into the pelvis and bilateral pelvic sidewalls as detailed with surrounding inflammatory changes in the pelvis.  Correlate with patient history.  A contrasted exam may be helpful although this is uncertain.      Electronically signed by: Efe Ferreira  Date:    12/13/2021  Time:    21:02             Narrative:    EXAMINATION:  CT ABDOMEN PELVIS WITHOUT CONTRAST    CLINICAL HISTORY:  Bowel obstruction suspected;    TECHNIQUE:  Low dose axial images, sagittal and coronal reformations were obtained from the lung bases to the pubic symphysis.    COMPARISON:  None    FINDINGS:  Motion degrades visualization of the lung bases.  No focal infiltrate.    Liver is unremarkable.  Gallbladder unremarkable.  Kidneys and adrenals appear normal.  Spleen and pancreas unremarkable.    There is subcutaneous air that extends from the right perineal region, bilateral pelvic sidewalls more so on the right, into the right psoas muscle superiorly to the paralumbar region and ventral to the urinary bladder.  The etiology is not clear based on this study.  There is inflammatory soft tissue changes seen in the right and ventral pelvis as well as in the presacral space.    No defined collection detected.    No bowel obstruction identified.  No fracture or osseous lesion.                                XR ABDOMEN, ACUTE 2 OR MORE VIEWS WITH CHEST (Final result)  Result time 12/13/21 18:56:48    Final result by Efe Ferreira MD (12/13/21 18:56:48)                 Impression:      No abnormality identified.      Electronically signed by: Efe Ferreira  Date:    12/13/2021  Time:    18:56             Narrative:    EXAMINATION:  XR ABDOMEN ACUTE 2 OR MORE VIEWS WITH CHEST    CLINICAL HISTORY:  abdominal pain;    TECHNIQUE:  PA chest radiograph, supine and erect views of the abdomen    COMPARISON:  None    FINDINGS:  Lungs clear.  Bowel gas pattern normal.  No abnormal calcifications.  No pneumoperitoneum.                                 Medications   piperacillin-tazobactam (ZOSYN) 4.5 g in dextrose 5 % in water (D5W) 5 % 100 mL IVPB (MB+) (4.5 g Intravenous New Bag 12/13/21 2142)   0.9%  NaCl infusion (has no administration in time range)   sodium chloride 0.9% bolus 1,000 mL (0 mLs Intravenous Stopped 12/13/21 1945)   morphine injection 4 mg (4 mg Intravenous Given 12/13/21 2053)   ondansetron injection 4 mg (4 mg Intravenous Given 12/13/21 2053)           APC / Resident Notes:   1945 Discussed results and history with Dr Pérez. Recommend CT w/o contrast.   2030 Dr Cazares consulted with completion of CT results.  Will see patient in ER.         

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
## 2021-12-15 NOTE — PLAN OF CARE
Problem: Communication Impairment (Mechanical Ventilation, Invasive)  Goal: Effective Communication  Outcome: Ongoing, Progressing     Problem: Device-Related Complication Risk (Mechanical Ventilation, Invasive)  Goal: Optimal Device Function  Outcome: Ongoing, Progressing     Problem: Inability to Wean (Mechanical Ventilation, Invasive)  Goal: Mechanical Ventilation Liberation  Outcome: Ongoing, Progressing     Problem: Nutrition Impairment (Mechanical Ventilation, Invasive)  Goal: Optimal Nutrition Delivery  Outcome: Ongoing, Progressing     Problem: Skin and Tissue Injury (Mechanical Ventilation, Invasive)  Goal: Absence of Device-Related Skin and Tissue Injury  Outcome: Ongoing, Progressing     Problem: Ventilator-Induced Lung Injury (Mechanical Ventilation, Invasive)  Goal: Absence of Ventilator-Induced Lung Injury  Outcome: Ongoing, Progressing

## 2021-12-15 NOTE — PROGRESS NOTES
[facility]  Pulmonology  Progress Note    Patient Name: Og Garcia  MRN: [id number]  Admission Date: 12/13/2021  Hospital Length of Stay: 2 days  Code Status: Full Code  Attending Provider: Harish Cazares MD  Primary Care Provider: Hector Arndt DNP, FNP-C   Principal Problem: Denice gangrene    Subjective:     Interval History: Remains on pressors. . Clearance worsening.     Objective:     Vital Signs (Most Recent):  Temp: (!) 100.6 °F (38.1 °C) (NURSE AWARE) (12/15/21 0712)  Pulse: 87 (12/15/21 0807)  Resp: (!) 30 (12/15/21 0807)  BP: 131/60 (12/15/21 0800)  SpO2: 99 % (12/15/21 0807) Vital Signs (24h Range):  Temp:  [99.4 °F (37.4 °C)-101.7 °F (38.7 °C)] 100.6 °F (38.1 °C)  Pulse:  [] 87  Resp:  [12-30] 30  SpO2:  [95 %-100 %] 99 %  BP: ()/(38-83) 131/60     Weight: 101 kg (222 lb 10.6 oz)  Body mass index is 31.06 kg/m².      Intake/Output Summary (Last 24 hours) at 12/15/2021 1031  Last data filed at 12/15/2021 0800  Gross per 24 hour   Intake 4518.19 ml   Output 540 ml   Net 3978.19 ml       Physical Exam  Vitals reviewed.   Constitutional:       Appearance: He is ill-appearing.      Comments: Intubated and sedated   HENT:      Head:      Comments: Right temporal deformity     Nose: Nose normal.      Mouth/Throat:      Mouth: Mucous membranes are moist.   Eyes:      Conjunctiva/sclera: Conjunctivae normal.      Pupils: Pupils are equal, round, and reactive to light.   Cardiovascular:      Rate and Rhythm: Regular rhythm. Tachycardia present.      Pulses: Normal pulses.      Heart sounds: Normal heart sounds.   Pulmonary:      Effort: No respiratory distress.      Breath sounds: No stridor. Rales present. No wheezing or rhonchi.   Abdominal:      Palpations: Abdomen is soft.      Tenderness: There is no guarding.      Comments: Anterior abdominal wall with wound vac in place   Genitourinary:     Comments: Packing in place  Musculoskeletal:         General: No  swelling or deformity.      Cervical back: Neck supple.      Right lower leg: No edema.      Left lower leg: No edema.   Lymphadenopathy:      Cervical: No cervical adenopathy.   Skin:     General: Skin is warm and dry.   Neurological:      Cranial Nerves: No cranial nerve deficit.         Vents:  Vent Mode: A/C (12/15/21 0807)  Set Rate: 30 BPM (12/15/21 0807)  Vt Set: 480 mL (12/15/21 0807)  PEEP/CPAP: 5 cmH20 (12/15/21 0807)  Oxygen Concentration (%): 40 (12/15/21 0823)  Peak Airway Pressure: 24 cmH2O (12/15/21 0807)  Plateau Pressure: 11 cmH20 (12/14/21 2330)  Total Ve: 11.4 mL (12/15/21 0807)  F/VT Ratio<105 (RSBI): (!) 78.95 (12/15/21 0807)    Lines/Drains/Airways     Central Venous Catheter Line                 Hemodialysis Catheter 12/15/21 0745 right internal jugular <1 day          Drain                 NG/OG Tube [place] sump 18 Fr. Left nostril -- days         Urethral Catheter 12/13/21 2130 16 Fr. 1 day         Open Drain 12/14/21 1220 Inferior;Midline Abdomen Penrose 1/2 inch <1 day          Airway                 Airway - Non-Surgical 12/14/21 1047 <1 day          Peripheral Intravenous Line                 Peripheral IV - Single Lumen 18 G Left;Posterior Wrist -- days         Peripheral IV - Single Lumen 12/13/21 1812 20 G Right Wrist 1 day         Peripheral IV - Single Lumen 12/15/21 0327 20 G Anterior;Distal;Left Upper Arm <1 day                Significant Labs:    CBC/Anemia Profile:  Recent Labs   Lab 12/13/21 1815 12/14/21  0016 12/14/21  1333   WBC 11.23* 11.09* 8.45   HGB 12.6* 10.7* 11.7*   HCT 37.6* 30.3* 33.4*    208 184   MCV 86.6 83.0 85.2   RDW 13.8 13.3 14.3        Chemistries:  Recent Labs   Lab 12/13/21  1815 12/13/21  1815 12/14/21  0016 12/14/21  1528 12/15/21  0436      < > 146* 151* 140   K 3.7   < > 3.9 4.3 4.5      < > 110* 114* 106   CO2 16*   < > 17* 15* 13*   BUN 58*   < > 66* 78* 78*   CREATININE 5.42*   < > 6.17* 7.36* 7.90*   CALCIUM 8.1*   < > 7.5* 6.6*  5.4*   ALBUMIN 2.5*  --   --  1.7*  --    PROT 8.1  --   --  6.5  --    BILITOT 0.5  --   --  0.5  --    ALKPHOS 69  --   --  72  --    ALT 83*  --   --  71*  --    *  --   --  175*  --     < > = values in this interval not displayed.       All pertinent labs within the past 24 hours have been reviewed.    Significant Imaging:  I have reviewed all pertinent imaging results/findings within the past 24 hours.    Assessment/Plan:     * Denice gangrene  - s/p debridement  - wound vac in place  - ID consulted for antibiotic management: merrem, daptomycin, clindamycin  - trend lactate  - initiate enteral nutrition    RAHAT (acute kidney injury)  - nephrology consulted for rapid acute rise in Cr  - worsening clearance today. Worsening acidosis  - right IJ HD line placed, plan for HD today  -  over 24 hours    On mechanically assisted ventilation  - remained intubated post-operatively  - baseline ABG with metabolic acidosis, increased MV rate to help with compensation    Hypertension  - now on pressors, stop metoprolol 25 mg BID      Critically ill with worsening renal clearance. Initiate hemodialysis today. Compensating for acidosis with increased minute ventilation. Repeat ABG after HD. Plan for OR tomorrow. Initiate enteral feeding today. ID following with abx, preliminary cultures with gram negative bacilli. His prognosis remains very guarded. Critical care time: 35 minutes.          Raul Hall MD  Pulmonology  [facility] ICU

## 2021-12-15 NOTE — PROGRESS NOTES
Progress Note                                                           Infectious disease   Admit Date: 12/13/2021    SUBJECTIVE:     Follow-up For:  Denice gangrene    HPI/Interval history:  Patient remains on pressors, T-max 101.1°.  He is due to start hemodialysis today.  I would  Review of Systems:    Unable to obtain as patient is sedated on the vent      OBJECTIVE:     Vital Signs Range (Last 24H):  Temp:  [99 °F (37.2 °C)-101.1 °F (38.4 °C)]   Pulse:  []   Resp:  [20-35]   BP: ()/(38-74)   SpO2:  [94 %-100 %]     Physical Exam:  Constitutional:  Sedated on the vent   HENT: supple   Head: normocephalic, atraumatic   Cardiovascular: regular rate and rhythm, S1, S2 normal, no murmur, click, rub or gallop  Pulmonary:no crepitations or wheezing appreciated  Gastrointestinal:  A little more distended than yesterday, midline wound with VAC putting out serosanguineous fluid, bowel sounds not heard  Muscular/Skeletal: Lower extremities without cyanosis or edema, no clubbing  Skin: Skin color, texture normal. No rashes, ulcers or new lesions. Skin warm and dry.     Laboratory:  CBC:   Recent Labs   Lab 12/14/21  1333   WBC 8.45   RBC 3.92*   HGB 11.7*   HCT 33.4*      MCV 85.2   MCH 29.8   MCHC 35.0     BMP:   Recent Labs   Lab 12/15/21  0436   *      K 4.5      CO2 13*   BUN 78*   CREATININE 7.90*   CALCIUM 5.4*     CMP:   Recent Labs   Lab 12/14/21  1528 12/14/21  1528 12/15/21  0436   *   < > 347*   CALCIUM 6.6*   < > 5.4*   ALBUMIN 1.7*  --   --    PROT 6.5  --   --    *   < > 140   K 4.3   < > 4.5   CO2 15*   < > 13*   *   < > 106   BUN 78*   < > 78*   CREATININE 7.36*   < > 7.90*   ALKPHOS 72  --   --    ALT 71*  --   --    *  --   --    BILITOT 0.5  --   --     < > = values in this interval not displayed.     Microbiology Results (last 7 days)     Procedure Component Value Units Date/Time    Culture, Wound [[id number]]  (Abnormal) Collected:  12/13/21 2315    Order Status: Completed Specimen: Abscess from Perineum Updated: 12/15/21 0907     Culture, Wound/Abscess Light Growth Gram-negative Bacilli     Comment: Identification and Susceptibility to Follow       Culture, Wound [[id number]]  (Abnormal) Collected: 12/14/21 1243    Order Status: Completed Specimen: Wound Updated: 12/15/21 0901     Culture, Wound/Abscess Rare growth Gram-negative Bacilli     Comment: Identification and Susceptibility to Follow       Blood culture #1 **CANNOT BE ORDERED STAT** [[id number]] Collected: 12/13/21 2112    Order Status: Completed Specimen: Blood Updated: 12/15/21 0735     Culture, Blood No Growth At 24 Hours    Blood culture #2 **CANNOT BE ORDERED STAT** [[id number]] Collected: 12/13/21 2117    Order Status: Completed Specimen: Blood Updated: 12/15/21 0735     Culture, Blood No Growth At 24 Hours    Urine culture [[id number]] Collected: 12/13/21 2133    Order Status: Completed Specimen: Urine Updated: 12/15/21 0729     Culture, Urine No Growth    Culture, Anaerobe [[id number]] Collected: 12/14/21 1242    Order Status: Resulted Specimen: Abscess from Abdomen Updated: 12/14/21 1242    Culture, Anaerobe [[id number]] Collected: 12/13/21 2315    Order Status: Resulted Specimen: Abscess from Perineum Updated: 12/13/21 2359          Diagnostic Results:  Labs: Reviewed    ASSESSMENT/PLAN:     Active Hospital Problems    Diagnosis  POA    *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]  Yes    On mechanically assisted ventilation [Z99.11]  Not Applicable    RAHAT (acute kidney injury) [N17.9]  Yes    Hypertension [I10]  Yes      Resolved Hospital Problems   No resolved problems to display.       ASSESSMENT:  1. Necrotizing fasciitis involving perineum and extending up into pelvis  2. Septic shock due to no more  3. Acute renal failure, worsening since admission and patient starting dialysis today  4. DM, likely uncontrolled        PLAN:  1. Continue empiric meropenem, clindamycin (more for antitoxin  effect) and daptomycin  2. F/U pending cultures and de-escalate antibiotic Tx as able

## 2021-12-15 NOTE — SUBJECTIVE & OBJECTIVE
Interval History: Remains on pressors. . Clearance worsening.     Objective:     Vital Signs (Most Recent):  Temp: (!) 100.6 °F (38.1 °C) (NURSE AWARE) (12/15/21 0712)  Pulse: 87 (12/15/21 0807)  Resp: (!) 30 (12/15/21 0807)  BP: 131/60 (12/15/21 0800)  SpO2: 99 % (12/15/21 0807) Vital Signs (24h Range):  Temp:  [99.4 °F (37.4 °C)-101.7 °F (38.7 °C)] 100.6 °F (38.1 °C)  Pulse:  [] 87  Resp:  [12-30] 30  SpO2:  [95 %-100 %] 99 %  BP: ()/(38-83) 131/60     Weight: 101 kg (222 lb 10.6 oz)  Body mass index is 31.06 kg/m².      Intake/Output Summary (Last 24 hours) at 12/15/2021 1031  Last data filed at 12/15/2021 0800  Gross per 24 hour   Intake 4518.19 ml   Output 540 ml   Net 3978.19 ml       Physical Exam  Vitals reviewed.   Constitutional:       Appearance: He is ill-appearing.      Comments: Intubated and sedated   HENT:      Head:      Comments: Right temporal deformity     Nose: Nose normal.      Mouth/Throat:      Mouth: Mucous membranes are moist.   Eyes:      Conjunctiva/sclera: Conjunctivae normal.      Pupils: Pupils are equal, round, and reactive to light.   Cardiovascular:      Rate and Rhythm: Regular rhythm. Tachycardia present.      Pulses: Normal pulses.      Heart sounds: Normal heart sounds.   Pulmonary:      Effort: No respiratory distress.      Breath sounds: No stridor. Rales present. No wheezing or rhonchi.   Abdominal:      Palpations: Abdomen is soft.      Tenderness: There is no guarding.      Comments: Anterior abdominal wall with wound vac in place   Genitourinary:     Comments: Packing in place  Musculoskeletal:         General: No swelling or deformity.      Cervical back: Neck supple.      Right lower leg: No edema.      Left lower leg: No edema.   Lymphadenopathy:      Cervical: No cervical adenopathy.   Skin:     General: Skin is warm and dry.   Neurological:      Cranial Nerves: No cranial nerve deficit.         Vents:  Vent Mode: A/C (12/15/21 0807)  Set Rate: 30  BPM (12/15/21 0807)  Vt Set: 480 mL (12/15/21 0807)  PEEP/CPAP: 5 cmH20 (12/15/21 0807)  Oxygen Concentration (%): 40 (12/15/21 0823)  Peak Airway Pressure: 24 cmH2O (12/15/21 0807)  Plateau Pressure: 11 cmH20 (12/14/21 2330)  Total Ve: 11.4 mL (12/15/21 0807)  F/VT Ratio<105 (RSBI): (!) 78.95 (12/15/21 0807)    Lines/Drains/Airways     Central Venous Catheter Line                 Hemodialysis Catheter 12/15/21 0745 right internal jugular <1 day          Drain                 NG/OG Tube [place] sump 18 Fr. Left nostril -- days         Urethral Catheter 12/13/21 2130 16 Fr. 1 day         Open Drain 12/14/21 1220 Inferior;Midline Abdomen Penrose 1/2 inch <1 day          Airway                 Airway - Non-Surgical 12/14/21 1047 <1 day          Peripheral Intravenous Line                 Peripheral IV - Single Lumen 18 G Left;Posterior Wrist -- days         Peripheral IV - Single Lumen 12/13/21 1812 20 G Right Wrist 1 day         Peripheral IV - Single Lumen 12/15/21 0327 20 G Anterior;Distal;Left Upper Arm <1 day                Significant Labs:    CBC/Anemia Profile:  Recent Labs   Lab 12/13/21 1815 12/14/21  0016 12/14/21  1333   WBC 11.23* 11.09* 8.45   HGB 12.6* 10.7* 11.7*   HCT 37.6* 30.3* 33.4*    208 184   MCV 86.6 83.0 85.2   RDW 13.8 13.3 14.3        Chemistries:  Recent Labs   Lab 12/13/21  1815 12/13/21  1815 12/14/21  0016 12/14/21  1528 12/15/21  0436      < > 146* 151* 140   K 3.7   < > 3.9 4.3 4.5      < > 110* 114* 106   CO2 16*   < > 17* 15* 13*   BUN 58*   < > 66* 78* 78*   CREATININE 5.42*   < > 6.17* 7.36* 7.90*   CALCIUM 8.1*   < > 7.5* 6.6* 5.4*   ALBUMIN 2.5*  --   --  1.7*  --    PROT 8.1  --   --  6.5  --    BILITOT 0.5  --   --  0.5  --    ALKPHOS 69  --   --  72  --    ALT 83*  --   --  71*  --    *  --   --  175*  --     < > = values in this interval not displayed.       All pertinent labs within the past 24 hours have been reviewed.    Significant Imaging:  I have  reviewed all pertinent imaging results/findings within the past 24 hours.

## 2021-12-15 NOTE — PROCEDURES
"Og Garcia is a 66 y.o. male patient.    Temp: (!) 100.6 °F (38.1 °C) (NURSE AWARE) (12/15/21 0712)  Pulse: 85 (12/15/21 1145)  Resp: (!) 35 (12/15/21 1145)  BP: (!) 86/46 (12/15/21 1145)  SpO2: 100 % (12/15/21 1145)  Weight: 101 kg (222 lb 10.6 oz) (12/15/21 0345)  Height: 5' 11" (180.3 cm) (12/14/21 1620)       Central Line    Date/Time: 12/15/2021 12:03 PM  Performed by: Harish Cazares MD  Authorized by: Harish Cazares MD     Location procedure was performed:  [facility] GENERAL SURGERY  Pre-operative diagnosis:  ARF  Post-operative diagnosis:  ARF  Indications:  Hemodialysis  Preparation:  Skin prepped with ChloraPrep  Location:  Right internal jugular  Catheter type:  Dialysis  Catheter size:  9 Fr  Ultrasound guidance: Yes    Vessel Caliber:  Large  Comprressibility:  Normal  Manometry: No    Complications: No    Specimens: No    Implants: No  Termination Site: superior vena cava        12/15/2021  "

## 2021-12-15 NOTE — PLAN OF CARE
Problem: Infection  Goal: Absence of Infection Signs and Symptoms  Outcome: Ongoing, Progressing     Problem: Adult Inpatient Plan of Care  Goal: Plan of Care Review  Outcome: Ongoing, Progressing  Goal: Patient-Specific Goal (Individualized)  Outcome: Ongoing, Progressing  Goal: Absence of Hospital-Acquired Illness or Injury  Outcome: Ongoing, Progressing  Goal: Optimal Comfort and Wellbeing  Outcome: Ongoing, Progressing  Goal: Readiness for Transition of Care  Outcome: Ongoing, Progressing     Problem: Fall Injury Risk  Goal: Absence of Fall and Fall-Related Injury  Outcome: Ongoing, Progressing     Problem: Skin Injury Risk Increased  Goal: Skin Health and Integrity  Outcome: Ongoing, Progressing     Problem: Fluid and Electrolyte Imbalance (Acute Kidney Injury/Impairment)  Goal: Fluid and Electrolyte Balance  Outcome: Ongoing, Progressing     Problem: Oral Intake Inadequate (Acute Kidney Injury/Impairment)  Goal: Optimal Nutrition Intake  Outcome: Ongoing, Progressing     Problem: Renal Function Impairment (Acute Kidney Injury/Impairment)  Goal: Effective Renal Function  Outcome: Ongoing, Progressing     Problem: Communication Impairment (Mechanical Ventilation, Invasive)  Goal: Effective Communication  Outcome: Ongoing, Progressing     Problem: Device-Related Complication Risk (Mechanical Ventilation, Invasive)  Goal: Optimal Device Function  Outcome: Ongoing, Progressing     Problem: Inability to Wean (Mechanical Ventilation, Invasive)  Goal: Mechanical Ventilation Liberation  Outcome: Ongoing, Progressing     Problem: Nutrition Impairment (Mechanical Ventilation, Invasive)  Goal: Optimal Nutrition Delivery  Outcome: Ongoing, Progressing     Problem: Skin and Tissue Injury (Mechanical Ventilation, Invasive)  Goal: Absence of Device-Related Skin and Tissue Injury  Outcome: Ongoing, Progressing     Problem: Ventilator-Induced Lung Injury (Mechanical Ventilation, Invasive)  Goal: Absence of Ventilator-Induced  Lung Injury  Outcome: Ongoing, Progressing     Problem: Communication Impairment (Artificial Airway)  Goal: Effective Communication  Outcome: Ongoing, Progressing     Problem: Device-Related Complication Risk (Artificial Airway)  Goal: Optimal Device Function  Outcome: Ongoing, Progressing     Problem: Skin and Tissue Injury (Artificial Airway)  Goal: Absence of Device-Related Skin or Tissue Injury  Outcome: Ongoing, Progressing     Problem: Noninvasive Ventilation Acute  Goal: Effective Unassisted Ventilation and Oxygenation  Outcome: Ongoing, Progressing

## 2021-12-15 NOTE — ASSESSMENT & PLAN NOTE
- nephrology consulted for rapid acute rise in Cr  - worsening clearance today. Worsening acidosis  - right IJ HD line placed, plan for HD today  -  over 24 hours

## 2021-12-15 NOTE — ASSESSMENT & PLAN NOTE
- s/p debridement  - wound vac in place  - ID consulted for antibiotic management: merrem, daptomycin, clindamycin  - trend lactate  - initiate enteral nutrition

## 2021-12-15 NOTE — NURSING
Holding pt npo for dialysis cath placement  00:08 notified dr butler of pts low bp, orders received.

## 2021-12-15 NOTE — PLAN OF CARE
Problem: Infection  Goal: Absence of Infection Signs and Symptoms  Outcome: Ongoing, Progressing     Problem: Adult Inpatient Plan of Care  Goal: Plan of Care Review  Outcome: Ongoing, Progressing  Goal: Patient-Specific Goal (Individualized)  Outcome: Ongoing, Progressing  Goal: Optimal Comfort and Wellbeing  Outcome: Ongoing, Progressing  Intervention: Monitor Pain and Promote Comfort  Flowsheets (Taken 12/14/2021 2053)  Pain Management Interventions: care clustered     Problem: Communication Impairment (Mechanical Ventilation, Invasive)  Goal: Effective Communication  Outcome: Ongoing, Progressing     Problem: Device-Related Complication Risk (Mechanical Ventilation, Invasive)  Goal: Optimal Device Function  Outcome: Ongoing, Progressing     Problem: Inability to Wean (Mechanical Ventilation, Invasive)  Goal: Mechanical Ventilation Liberation  Outcome: Ongoing, Progressing  Intervention: Promote Extubation and Mechanical Ventilation Liberation  Flowsheets (Taken 12/14/2021 2053)  Sleep/Rest Enhancement: awakenings minimized  Environmental Support: calm environment promoted

## 2021-12-15 NOTE — ASSESSMENT & PLAN NOTE
- remained intubated post-operatively  - baseline ABG with metabolic acidosis, increased MV rate to help with compensation

## 2021-12-16 PROBLEM — A41.9 SEPTIC SHOCK: Status: ACTIVE | Noted: 2021-01-01

## 2021-12-16 PROBLEM — R65.21 SEPTIC SHOCK: Status: ACTIVE | Noted: 2021-01-01

## 2021-12-16 PROBLEM — E83.51 HYPOCALCEMIA: Status: ACTIVE | Noted: 2021-01-01

## 2021-12-16 NOTE — ASSESSMENT & PLAN NOTE
- secondary to Denice gangrene  - echo: The left ventricle is normal in size with mild concentric hypertrophy and mildly decreased systolic function. The estimated ejection fraction is 40%. Left ventricular diastolic dysfunction.  - weaning pressors  - abx as below  - OR today

## 2021-12-16 NOTE — PLAN OF CARE
Problem: Communication Impairment (Mechanical Ventilation, Invasive)  Goal: Effective Communication  Outcome: Ongoing, Progressing     Problem: Device-Related Complication Risk (Mechanical Ventilation, Invasive)  Goal: Optimal Device Function  Outcome: Ongoing, Progressing     Problem: Inability to Wean (Mechanical Ventilation, Invasive)  Goal: Mechanical Ventilation Liberation  Outcome: Ongoing, Progressing     Problem: Nutrition Impairment (Mechanical Ventilation, Invasive)  Goal: Optimal Nutrition Delivery  Outcome: Ongoing, Progressing     Problem: Skin and Tissue Injury (Mechanical Ventilation, Invasive)  Goal: Absence of Device-Related Skin and Tissue Injury  Outcome: Ongoing, Progressing     Problem: Ventilator-Induced Lung Injury (Mechanical Ventilation, Invasive)  Goal: Absence of Ventilator-Induced Lung Injury  Outcome: Ongoing, Progressing     Problem: Device-Related Complication Risk (Mechanical Ventilation, Invasive)  Goal: Optimal Device Function  Outcome: Ongoing, Progressing     Problem: Inability to Wean (Mechanical Ventilation, Invasive)  Goal: Mechanical Ventilation Liberation  Outcome: Ongoing, Progressing     Problem: Nutrition Impairment (Mechanical Ventilation, Invasive)  Goal: Optimal Nutrition Delivery  Outcome: Ongoing, Progressing     Problem: Skin and Tissue Injury (Mechanical Ventilation, Invasive)  Goal: Absence of Device-Related Skin and Tissue Injury  Outcome: Ongoing, Progressing     Problem: Ventilator-Induced Lung Injury (Mechanical Ventilation, Invasive)  Goal: Absence of Ventilator-Induced Lung Injury  Outcome: Ongoing, Progressing

## 2021-12-16 NOTE — ASSESSMENT & PLAN NOTE
- remained intubated post-operatively  - continue with high MV to help with compensation, repeat ABG after HD

## 2021-12-16 NOTE — PROGRESS NOTES
Progress Note                                                           Infectious disease   Admit Date: 12/13/2021    SUBJECTIVE:     Follow-up For:  Septic shock    HPI/Interval history:  Patient remains on pressors but that is being titrated down, T-max 100.4 overnight.  He is getting 2nd hemodialysis session today.    Review of Systems:    Unable to obtain as patient is sedated on the vent      OBJECTIVE:     Vital Signs Range (Last 24H):  Temp:  [98.1 °F (36.7 °C)-100.4 °F (38 °C)]   Pulse:  []   Resp:  [21-36]   BP: ()/(47-90)   SpO2:  [96 %-100 %]     Physical Exam:  Constitutional:  Sedated on the vent   HENT: supple   Head: normocephalic, atraumatic   Cardiovascular: regular rate and rhythm, S1, S2 normal, no murmur, click, rub or gallop  Pulmonary:no crepitations or wheezing appreciated  Gastrointestinal:  Slightly distended, midline wound with VAC putting out serosanguineous fluid, bowel sounds not heard  :  Scant amount of concentrated urine from Ferris catheter  Muscular/Skeletal: Lower extremities without cyanosis or edema, no clubbing  Skin: Skin color, texture normal. No rashes, ulcers or new lesions. Skin warm and dry.     Laboratory:  CBC:   Recent Labs   Lab 12/16/21  0432   WBC 7.81   RBC 3.03*   HGB 8.9*   HCT 25.9*   *   MCV 85.5   MCH 29.4   MCHC 34.4     BMP:   Recent Labs   Lab 12/16/21  0432   *      K 3.8      CO2 16*   BUN 76*   CREATININE 8.21*   CALCIUM 6.8*     CMP:   Recent Labs   Lab 12/16/21  0432   *   CALCIUM 6.8*   ALBUMIN 1.2*   PROT 5.9*      K 3.8   CO2 16*      BUN 76*   CREATININE 8.21*   ALKPHOS 113   ALT 43   *   BILITOT 1.2     Microbiology Results (last 7 days)     Procedure Component Value Units Date/Time    Culture, Wound [[id number]]  (Abnormal)  (Susceptibility) Collected: 12/13/21 1509    Order Status: Completed Specimen: Abscess from Perineum Updated: 12/16/21 5480     Culture, Wound/Abscess Light  Growth Escherichia coli    Blood culture #1 **CANNOT BE ORDERED STAT** [[id number]] Collected: 12/13/21 2112    Order Status: Completed Specimen: Blood Updated: 12/16/21 0834     Culture, Blood No Growth At 48 Hours    Blood culture #2 **CANNOT BE ORDERED STAT** [[id number]] Collected: 12/13/21 2117    Order Status: Completed Specimen: Blood Updated: 12/16/21 0834     Culture, Blood No Growth At 48 Hours    Culture, Wound [[id number]]  (Abnormal) Collected: 12/14/21 1243    Order Status: Completed Specimen: Wound from Abdomen Updated: 12/15/21 0901     Culture, Wound/Abscess Rare growth Gram-negative Bacilli     Comment: Identification and Susceptibility to Follow       Urine culture [[id number]] Collected: 12/13/21 2133    Order Status: Completed Specimen: Urine Updated: 12/15/21 0729     Culture, Urine No Growth    Culture, Anaerobe [[id number]] Collected: 12/14/21 1242    Order Status: Resulted Specimen: Abscess from Abdomen Updated: 12/14/21 1242    Culture, Anaerobe [[id number]] Collected: 12/13/21 2315    Order Status: Resulted Specimen: Abscess from Perineum Updated: 12/13/21 6621          Diagnostic Results:  Labs: Reviewed    ASSESSMENT/PLAN:     Active Hospital Problems    Diagnosis  POA    *Septic shock [A41.9, R65.21]  No    Hypocalcemia [E83.51]  Yes    On mechanically assisted ventilation [Z99.11]  Not Applicable    RAHAT (acute kidney injury) [N17.9]  Yes    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       ASSESSMENT:  1. Necrotizing fasciitis involving perineum and extending up into pelvis.  So far only E coli isolated  2. Septic shock due to #1; vasopressor requirements decreasing  3. Acute renal failure, worsening since admission and patient now on dialysis  4. DM, likely uncontrolled        PLAN:  1. Deescalate from meropenem to ceftriaxone, and continue clindamycin and daptomycin for now  2. F/U finalized cultures and  for the de-escalate antibiotic Tx as able    Discussed with Dr. Hall

## 2021-12-16 NOTE — NURSING
0745- ABGs drawn per Respiratory    0800-  aware of ABG results.     0900- Tube feedings on hold for procedure. Per

## 2021-12-16 NOTE — SUBJECTIVE & OBJECTIVE
Interval History: Plan for OR today, delayed due to tube feedings not being held. Undergoing HD this am. Remains on low dose pressor, weaning.     Objective:     Vital Signs (Most Recent):  Temp: 98.4 °F (36.9 °C) (12/16/21 1030)  Pulse: 61 (12/16/21 1030)  Resp: (!) 35 (12/16/21 1030)  BP: (!) 95/54 (12/16/21 1030)  SpO2: 100 % (12/16/21 1030) Vital Signs (24h Range):  Temp:  [98.1 °F (36.7 °C)-100.4 °F (38 °C)] 98.4 °F (36.9 °C)  Pulse:  [] 61  Resp:  [21-36] 35  SpO2:  [94 %-100 %] 100 %  BP: ()/(46-90) 95/54     Weight: 102.1 kg (225 lb 1.4 oz)  Body mass index is 31.39 kg/m².      Intake/Output Summary (Last 24 hours) at 12/16/2021 1048  Last data filed at 12/16/2021 1001  Gross per 24 hour   Intake 1913.28 ml   Output 2110 ml   Net -196.72 ml       Physical Exam  Vitals reviewed.   Constitutional:       Appearance: He is ill-appearing.      Comments: Intubated and sedated   HENT:      Head:      Comments: Right temporal deformity     Nose: Nose normal.      Mouth/Throat:      Mouth: Mucous membranes are moist.   Eyes:      Conjunctiva/sclera: Conjunctivae normal.      Pupils: Pupils are equal, round, and reactive to light.   Cardiovascular:      Rate and Rhythm: Normal rate and regular rhythm.      Pulses: Normal pulses.      Heart sounds: Normal heart sounds.   Pulmonary:      Effort: No respiratory distress.      Breath sounds: No stridor. No wheezing, rhonchi or rales.      Comments: Ventilator driven breath sounds bilaterally  Abdominal:      Palpations: Abdomen is soft.      Tenderness: There is no guarding.      Comments: Anterior abdominal wall with wound vac in place   Genitourinary:     Comments: Packing in place  Musculoskeletal:         General: No swelling or deformity.      Cervical back: Neck supple.      Right lower leg: No edema.      Left lower leg: No edema.   Lymphadenopathy:      Cervical: No cervical adenopathy.   Skin:     General: Skin is warm and dry.   Neurological:       Cranial Nerves: No cranial nerve deficit.      Comments: Intubated and sedated         Vents:  Vent Mode: A/C (12/16/21 0744)  Set Rate: 35 BPM (12/16/21 0744)  Vt Set: 480 mL (12/16/21 0744)  PEEP/CPAP: 5 cmH20 (12/16/21 0744)  Oxygen Concentration (%): 40 (12/16/21 0748)  Peak Airway Pressure: 22 cmH2O (12/16/21 0744)  Plateau Pressure: 11 cmH20 (12/14/21 2330)  Total Ve: 13.5 mL (12/16/21 0744)  F/VT Ratio<105 (RSBI): (!) 91.86 (12/16/21 0744)    Lines/Drains/Airways     Central Venous Catheter Line                 Hemodialysis Catheter 12/15/21 0745 right internal jugular 1 day          Drain                 NG/OG Tube [place] sump 18 Fr. Left nostril -- days         Urethral Catheter 12/13/21 2130 16 Fr. 2 days         Open Drain 12/14/21 1220 Inferior;Midline Abdomen Penrose 1/2 inch 1 day          Airway                 Airway - Non-Surgical 12/14/21 1047 2 days          Peripheral Intravenous Line                 Peripheral IV - Single Lumen 18 G Left;Posterior Wrist -- days         Peripheral IV - Single Lumen 12/13/21 1812 20 G Right Wrist 2 days         Peripheral IV - Single Lumen 12/15/21 0327 20 G Anterior;Distal;Right Upper Arm 1 day                Significant Labs:    CBC/Anemia Profile:  Recent Labs   Lab 12/14/21  1333 12/16/21  0432   WBC 8.45 7.81   HGB 11.7* 8.9*   HCT 33.4* 25.9*    136*   MCV 85.2 85.5   RDW 14.3 14.8*        Chemistries:  Recent Labs   Lab 12/14/21  1528 12/15/21  0436 12/16/21  0432   * 140 141   K 4.3 4.5 3.8   * 106 105   CO2 15* 13* 16*   BUN 78* 78* 76*   CREATININE 7.36* 7.90* 8.21*   CALCIUM 6.6* 5.4* 6.8*   ALBUMIN 1.7*  --  1.2*   PROT 6.5  --  5.9*   BILITOT 0.5  --  1.2   ALKPHOS 72  --  113   ALT 71*  --  43   *  --  159*       All pertinent labs within the past 24 hours have been reviewed.    Significant Imaging:  I have reviewed all pertinent imaging results/findings within the past 24 hours.

## 2021-12-16 NOTE — DIALYSIS ROUNDING
"          Nephrology Department            NAME: Og Garcia   YOB: 1955  MRN: [id number]  NOTE DATE: 12/15/2021       Seen on dialysis in room ICUS 9.  He is tolerating the procedure.  His situation is critical.      Patient complains:  None.      REVIEW OF SYSTEMS:  he reports no shortness of breath, nausea or vomiting. No acute changes.    VITALS:  height is 5' 11" (1.803 m) and weight is 101 kg (222 lb 10.6 oz). His axillary temperature is 98.1 °F (36.7 °C). His blood pressure is 106/60 and his pulse is 80. His respiration is 35 (abnormal) and oxygen saturation is 99%.  Hemodialysis documentation flowsheet reviewed with dialysis nurse, including weight and vitals.    Resting comfortably, NAD.  The patient is ventilated.Lungs clear.  Heart regular, no rub.  Abdomen soft, nontender, positive bowl sounds   1 plus edema.    Recent Labs   Lab 12/14/21  0016 12/14/21  1528 12/15/21  0436   * 151* 140   K 3.9 4.3 4.5   * 114* 106   CO2 17* 15* 13*   BUN 66* 78* 78*   CREATININE 6.17* 7.36* 7.90*   CALCIUM 7.5* 6.6* 5.4*     Recent Labs   Lab 12/13/21  1815 12/14/21  0016 12/14/21  1333   WBC 11.23* 11.09* 8.45   HGB 12.6* 10.7* 11.7*   HCT 37.6* 30.3* 33.4*    208 184       ASSESSMENT/PLAN:  Continue with scheduled hemodialysis for this patient.    Edwin Pryor Jr, MD  "

## 2021-12-16 NOTE — PROGRESS NOTES
[facility]  Pulmonology  Progress Note    Patient Name: Og Garcia  MRN: [id number]  Admission Date: 12/13/2021  Hospital Length of Stay: 3 days  Code Status: Full Code  Attending Provider: Harish Cazares MD  Primary Care Provider: Hector Arndt DNP, FNP-C   Principal Problem: Septic shock    Subjective:     Interval History: Plan for OR today, delayed due to tube feedings not being held. Undergoing HD this am. Remains on low dose pressor, weaning.     Objective:     Vital Signs (Most Recent):  Temp: 98.4 °F (36.9 °C) (12/16/21 1030)  Pulse: 61 (12/16/21 1030)  Resp: (!) 35 (12/16/21 1030)  BP: (!) 95/54 (12/16/21 1030)  SpO2: 100 % (12/16/21 1030) Vital Signs (24h Range):  Temp:  [98.1 °F (36.7 °C)-100.4 °F (38 °C)] 98.4 °F (36.9 °C)  Pulse:  [] 61  Resp:  [21-36] 35  SpO2:  [94 %-100 %] 100 %  BP: ()/(46-90) 95/54     Weight: 102.1 kg (225 lb 1.4 oz)  Body mass index is 31.39 kg/m².      Intake/Output Summary (Last 24 hours) at 12/16/2021 1048  Last data filed at 12/16/2021 1001  Gross per 24 hour   Intake 1913.28 ml   Output 2110 ml   Net -196.72 ml       Physical Exam  Vitals reviewed.   Constitutional:       Appearance: He is ill-appearing.      Comments: Intubated and sedated   HENT:      Head:      Comments: Right temporal deformity     Nose: Nose normal.      Mouth/Throat:      Mouth: Mucous membranes are moist.   Eyes:      Conjunctiva/sclera: Conjunctivae normal.      Pupils: Pupils are equal, round, and reactive to light.   Cardiovascular:      Rate and Rhythm: Normal rate and regular rhythm.      Pulses: Normal pulses.      Heart sounds: Normal heart sounds.   Pulmonary:      Effort: No respiratory distress.      Breath sounds: No stridor. No wheezing, rhonchi or rales.      Comments: Ventilator driven breath sounds bilaterally  Abdominal:      Palpations: Abdomen is soft.      Tenderness: There is no guarding.      Comments: Anterior abdominal wall with wound vac  in place   Genitourinary:     Comments: Packing in place  Musculoskeletal:         General: No swelling or deformity.      Cervical back: Neck supple.      Right lower leg: No edema.      Left lower leg: No edema.   Lymphadenopathy:      Cervical: No cervical adenopathy.   Skin:     General: Skin is warm and dry.   Neurological:      Cranial Nerves: No cranial nerve deficit.      Comments: Intubated and sedated         Vents:  Vent Mode: A/C (12/16/21 0744)  Set Rate: 35 BPM (12/16/21 0744)  Vt Set: 480 mL (12/16/21 0744)  PEEP/CPAP: 5 cmH20 (12/16/21 0744)  Oxygen Concentration (%): 40 (12/16/21 0748)  Peak Airway Pressure: 22 cmH2O (12/16/21 0744)  Plateau Pressure: 11 cmH20 (12/14/21 2330)  Total Ve: 13.5 mL (12/16/21 0744)  F/VT Ratio<105 (RSBI): (!) 91.86 (12/16/21 0744)    Lines/Drains/Airways     Central Venous Catheter Line                 Hemodialysis Catheter 12/15/21 0745 right internal jugular 1 day          Drain                 NG/OG Tube [place] sump 18 Fr. Left nostril -- days         Urethral Catheter 12/13/21 2130 16 Fr. 2 days         Open Drain 12/14/21 1220 Inferior;Midline Abdomen Penrose 1/2 inch 1 day          Airway                 Airway - Non-Surgical 12/14/21 1047 2 days          Peripheral Intravenous Line                 Peripheral IV - Single Lumen 18 G Left;Posterior Wrist -- days         Peripheral IV - Single Lumen 12/13/21 1812 20 G Right Wrist 2 days         Peripheral IV - Single Lumen 12/15/21 0327 20 G Anterior;Distal;Right Upper Arm 1 day                Significant Labs:    CBC/Anemia Profile:  Recent Labs   Lab 12/14/21  1333 12/16/21  0432   WBC 8.45 7.81   HGB 11.7* 8.9*   HCT 33.4* 25.9*    136*   MCV 85.2 85.5   RDW 14.3 14.8*        Chemistries:  Recent Labs   Lab 12/14/21  1528 12/15/21  0436 12/16/21  0432   * 140 141   K 4.3 4.5 3.8   * 106 105   CO2 15* 13* 16*   BUN 78* 78* 76*   CREATININE 7.36* 7.90* 8.21*   CALCIUM 6.6* 5.4* 6.8*   ALBUMIN 1.7*   --  1.2*   PROT 6.5  --  5.9*   BILITOT 0.5  --  1.2   ALKPHOS 72  --  113   ALT 71*  --  43   *  --  159*       All pertinent labs within the past 24 hours have been reviewed.    Significant Imaging:  I have reviewed all pertinent imaging results/findings within the past 24 hours.    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 The estimated ejection fraction is 40%. Left ventricular diastolic dysfunction.  - weaning pressors  - abx as below  - OR today        Hypocalcemia  - 6 gm IV today for iCa 0.83    RAHAT (acute kidney injury)  - nephrology consulted for rapid acute rise in Cr  - anuric  - Underwent HD 12/15 and 12/16  - right IJ HD line       On mechanically assisted ventilation  - remained intubated post-operatively  - continue with high MV to help with compensation, repeat ABG after HD    Denice gangrene  - s/p debridement  - wound vac in place  - ID consulted for antibiotic management: merrem, daptomycin, clindamycin  - initial cultures with E Coli  - resume enteral nutrition after OR today    Hypertension  - now on pressors, stop metoprolol 25 mg BID        Remains critically ill with septic shock secondary to Denice gangrene. Abx per ID, preliminary cultures with E Coli. Wean pressors as tolerated. High MV for metabolic acidosis from renal failure. Continue HD per nephrology. Critical care time: 35 minutes.          Raul Hall MD  Pulmonology  [facility] ICU

## 2021-12-16 NOTE — INTERVAL H&P NOTE
The patient has been examined and the H&P has been reviewed:    I concur with the findings and no changes have occurred since H&P was written.    Surgery risks, benefits and alternative options discussed and understood by patient/family.    OR for additional debridements.  Risks and benefits explained to family.  All questions answered. 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

## 2021-12-16 NOTE — SUBJECTIVE & OBJECTIVE
Interval History: The patient's condition is about the same.  He did undergo wound washout today.    Review of patient's allergies indicates:  No Known Allergies  Current Facility-Administered Medications   Medication Frequency    0.9%  NaCl infusion PRN    acetaminophen tablet 500 mg Q6H PRN    cefTRIAXone (ROCEPHIN) 2 g in dextrose 5 % in water (D5W) 5 % 50 mL IVPB (MB+) Q24H    clindamycin in D5W 900 mg/50 mL IVPB 900 mg Q8H    DAPTOmycin (CUBICIN) 500 mg in sodium chloride 0.9% IVPB Q48H    dextrose 50% injection 12.5 g PRN    fentaNYL 2500 mcg in D5W 250 mL infusion premix (titrating) (conc: 10 mcg/mL) Continuous    glucagon (human recombinant) injection 1 mg PRN    heparin (porcine) injection 4,000 Units PRN    insulin aspart U-100 injection 1-10 Units Q6H PRN    NORepinephrine 32 mg in dextrose 5 % 250 mL infusion Continuous    pantoprazole injection 40 mg Daily    propofol (DIPRIVAN) 10 mg/mL infusion Continuous       Objective:     Vital Signs (Most Recent):  Temp: 98.6 °F (37 °C) (12/16/21 1530)  Pulse: 67 (12/16/21 1530)  Resp: (!) 35 (12/16/21 1530)  BP: (!) 97/57 (12/16/21 1530)  SpO2: 97 % (12/16/21 1530)  O2 Device (Oxygen Therapy): ventilator (12/16/21 1143) Vital Signs (24h Range):  Temp:  [97.7 °F (36.5 °C)-100.4 °F (38 °C)] 98.6 °F (37 °C)  Pulse:  [] 67  Resp:  [21-36] 35  SpO2:  [94 %-100 %] 97 %  BP: ()/(48-90) 97/57     Weight: 102.1 kg (225 lb 1.4 oz) (12/16/21 0300)  Body mass index is 31.39 kg/m².  Body surface area is 2.26 meters squared.    I/O last 3 completed shifts:  In: 5191.6 [I.V.:4121.6; Other:500; NG/GT:170; IV Piggyback:400]  Out: 2175 [Urine:175; Other:2000]    Physical Exam  Vitals reviewed.   Cardiovascular:      Rate and Rhythm: Regular rhythm.   Pulmonary:      Comments: Ventilated  Abdominal:      Palpations: Abdomen is soft.   Neurological:      Mental Status: He is alert.         Significant Labs:  BMP:   Recent Labs   Lab 12/16/21  6137   GLU  113*      K 3.8      CO2 16*   BUN 76*   CREATININE 8.21*   CALCIUM 6.8*     CBC:   Recent Labs   Lab 12/16/21  0432   WBC 7.81   RBC 3.03*   HGB 8.9*   HCT 25.9*   *   MCV 85.5   MCH 29.4   MCHC 34.4        Significant Imaging:  Labs: Reviewed

## 2021-12-16 NOTE — ASSESSMENT & PLAN NOTE
- s/p debridement  - wound vac in place  - ID consulted for antibiotic management: merrem, daptomycin, clindamycin  - initial cultures with E Coli  - resume enteral nutrition after OR today

## 2021-12-16 NOTE — OP NOTE
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    Procedure in Detail:  After informed consent patient brought to the OR prepped and draped in the usual sterile fashion.  We started off by removing the old wound VAC and finding a pocket of retained fluid in the right lateral aspect of the preperitoneal area and tracking towards the retroperitoneum.  We broke up all the loculations as a tract into the retroperitoneum irrigated this area out and washed out the rest of the abdominal area.  The left in the right rectus sheaths were all irrigated out again the right side always had larger than the left.  The pelvis we also washed out as a connected to the perirectal space.  Once we washed all these areas out we ended up placing a our wound VAC back in with the ABThera sponge on the deep aspect and blue sponges on the right lateral/retroperitoneum as well as the pelvis area.  We then placed a wound VAC on to 125 of pressure.  We then washed out the right perirectal area with some irrigation and a Kerlix.  No more purulence is appreciated from the bottom part there was a smell associated with the.  Patient tolerated the procedure well.    Complications: No    Estimated Blood Loss (EBL): * No values recorded between 12/16/2021  1:32 PM and 12/16/2021  2:08 PM *           Implants: * No implants in log *    Specimens:   Specimen  (24h ago, onward)            None                  Condition: Good    Disposition: ICU - intubated and critically ill.    Attestation: I was present and scrubbed for the entire procedure.

## 2021-12-16 NOTE — PROGRESS NOTES
[facility]  Nephrology  Progress Note    Patient Name: Og Garcia  MRN: [id number]  Admission Date: 12/13/2021  Hospital Length of Stay: 3 days  Attending Provider: Harish Cazares MD   Primary Care Physician: Hector Arndt DNP, FNP-C  Principal Problem:Septic shock    Subjective:     HPI: History per the chart as the patient is intubated.  This patient presented with abdominal pain.  He subsequently had a CT of the abdomen which showed subcutaneous air.  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  Urine output has been minimal.  Serum creatinine has trended up to 7.3.  Serum potassium is 4.3.  Nephrology is consulted for renal issues.  The CT abdomen did not show renal abnormality.      Interval History: The patient's condition is about the same.  He did undergo wound washout today.    Review of patient's allergies indicates:  No Known Allergies  Current Facility-Administered Medications   Medication Frequency    0.9%  NaCl infusion PRN    acetaminophen tablet 500 mg Q6H PRN    cefTRIAXone (ROCEPHIN) 2 g in dextrose 5 % in water (D5W) 5 % 50 mL IVPB (MB+) Q24H    clindamycin in D5W 900 mg/50 mL IVPB 900 mg Q8H    DAPTOmycin (CUBICIN) 500 mg in sodium chloride 0.9% IVPB Q48H    dextrose 50% injection 12.5 g PRN    fentaNYL 2500 mcg in D5W 250 mL infusion premix (titrating) (conc: 10 mcg/mL) Continuous    glucagon (human recombinant) injection 1 mg PRN    heparin (porcine) injection 4,000 Units PRN    insulin aspart U-100 injection 1-10 Units Q6H PRN    NORepinephrine 32 mg in dextrose 5 % 250 mL infusion Continuous    pantoprazole injection 40 mg Daily    propofol (DIPRIVAN) 10 mg/mL infusion Continuous       Objective:     Vital Signs (Most Recent):  Temp: 98.6 °F (37 °C) (12/16/21 1530)  Pulse: 67 (12/16/21 1530)  Resp: (!) 35 (12/16/21 1530)  BP: (!) 97/57 (12/16/21 1530)  SpO2: 97 % (12/16/21 1530)  O2 Device (Oxygen Therapy): ventilator (12/16/21 1143) Vital  Signs (24h Range):  Temp:  [97.7 °F (36.5 °C)-100.4 °F (38 °C)] 98.6 °F (37 °C)  Pulse:  [] 67  Resp:  [21-36] 35  SpO2:  [94 %-100 %] 97 %  BP: ()/(48-90) 97/57     Weight: 102.1 kg (225 lb 1.4 oz) (12/16/21 0300)  Body mass index is 31.39 kg/m².  Body surface area is 2.26 meters squared.    I/O last 3 completed shifts:  In: 5191.6 [I.V.:4121.6; Other:500; NG/GT:170; IV Piggyback:400]  Out: 2175 [Urine:175; Other:2000]    Physical Exam  Vitals reviewed.   Cardiovascular:      Rate and Rhythm: Regular rhythm.   Pulmonary:      Comments: Ventilated  Abdominal:      Palpations: Abdomen is soft.   Neurological:      Mental Status: He is alert.         Significant Labs:  BMP:   Recent Labs   Lab 12/16/21  0432   *      K 3.8      CO2 16*   BUN 76*   CREATININE 8.21*   CALCIUM 6.8*     CBC:   Recent Labs   Lab 12/16/21 0432   WBC 7.81   RBC 3.03*   HGB 8.9*   HCT 25.9*   *   MCV 85.5   MCH 29.4   MCHC 34.4        Significant Imaging:  Labs: Reviewed    Assessment/Plan:     RAHAT (acute kidney injury)  Septic ATN  No renal recovery.   Low threshhold for dialysis support.  Ask for dialysis catheter  Hepatitis panel  12/16/2021 Continue with dialysis support    Denice gangrene  S/p wound debridement  S/p wound washout today.        Thank you for your consult. I will follow-up with patient. Please contact us if you have any additional questions.    Edwin Pryor Jr, MD  Nephrology  [facility]

## 2021-12-16 NOTE — ASSESSMENT & PLAN NOTE
- nephrology consulted for rapid acute rise in Cr  - anuric  - Underwent HD 12/15 and 12/16  - right IJ HD line

## 2021-12-16 NOTE — ASSESSMENT & PLAN NOTE
Discussed the importance of blood sugar control in the prevention of ocular complications. Septic ATN  No renal recovery.   Low threshhold for dialysis support.  Ask for dialysis catheter  Hepatitis panel  12/16/2021 Continue with dialysis support

## 2021-12-17 PROBLEM — Z79.02 ANTIPLATELET OR ANTITHROMBOTIC LONG-TERM USE: Status: ACTIVE | Noted: 2021-01-01

## 2021-12-17 NOTE — PROGRESS NOTES
[facility]  Pulmonology  Progress Note    Patient Name: Og Garcia  MRN: [id number]  Admission Date: 12/13/2021  Hospital Length of Stay: 4 days  Code Status: Full Code  Attending Provider: Harish Cazares MD  Primary Care Provider: Hector Arndt DNP, FNP-C   Principal Problem: Septic shock    Subjective:     Interval History: OR yesterday with further debridement. Remains on pressors today. Currently undergoing HD.    Objective:     Vital Signs (Most Recent):  Temp: 99.5 °F (37.5 °C) (12/17/21 1030)  Pulse: 75 (12/17/21 1030)  Resp: (!) 35 (12/17/21 1030)  BP: (!) 98/59 (12/17/21 1030)  SpO2: 100 % (12/17/21 1030) Vital Signs (24h Range):  Temp:  [97.7 °F (36.5 °C)-101.3 °F (38.5 °C)] 99.5 °F (37.5 °C)  Pulse:  [59-89] 75  Resp:  [34-35] 35  SpO2:  [94 %-100 %] 100 %  BP: ()/(47-61) 98/59     Weight: 100.5 kg (221 lb 9 oz)  Body mass index is 30.9 kg/m².      Intake/Output Summary (Last 24 hours) at 12/17/2021 1046  Last data filed at 12/17/2021 1000  Gross per 24 hour   Intake 1642.68 ml   Output 2340 ml   Net -697.32 ml       Physical Exam  Vitals reviewed.   Constitutional:       Appearance: He is ill-appearing.      Comments: Intubated and sedated   HENT:      Head:      Comments: Right temporal deformity     Nose: Nose normal.      Mouth/Throat:      Mouth: Mucous membranes are moist.   Eyes:      Conjunctiva/sclera: Conjunctivae normal.      Pupils: Pupils are equal, round, and reactive to light.   Cardiovascular:      Rate and Rhythm: Normal rate and regular rhythm.      Pulses: Normal pulses.      Heart sounds: Normal heart sounds.   Pulmonary:      Effort: No respiratory distress.      Breath sounds: No stridor. No wheezing, rhonchi or rales.      Comments: Ventilator driven breath sounds bilaterally  Abdominal:      Palpations: Abdomen is soft.      Tenderness: There is no guarding.      Comments: Anterior abdominal wall with wound vac in place   Genitourinary:      Comments: Packing in place  Musculoskeletal:         General: No swelling or deformity.      Cervical back: Neck supple.      Right lower leg: No edema.      Left lower leg: No edema.   Lymphadenopathy:      Cervical: No cervical adenopathy.   Skin:     General: Skin is warm and dry.   Neurological:      Cranial Nerves: No cranial nerve deficit.      Comments: Intubated and sedated         Vents:  Vent Mode: A/C (12/17/21 0821)  Set Rate: 35 BPM (12/17/21 0821)  Vt Set: 480 mL (12/17/21 0821)  PEEP/CPAP: 5 cmH20 (12/17/21 0821)  Oxygen Concentration (%): 30 (12/17/21 1030)  Peak Airway Pressure: 26 cmH2O (12/17/21 0821)  Plateau Pressure: 11 cmH20 (12/14/21 2330)  Total Ve: 14 mL (12/17/21 0821)  F/VT Ratio<105 (RSBI): (!) 92.84 (12/16/21 1506)    Lines/Drains/Airways     Central Venous Catheter Line                 Hemodialysis Catheter 12/15/21 0745 right internal jugular 2 days          Drain                 NG/OG Tube [place] sump 18 Fr. Left nostril -- days         Urethral Catheter 12/13/21 2130 16 Fr. 3 days         Open Drain 12/14/21 1220 Inferior;Midline Abdomen Penrose 1/2 inch 2 days          Airway                 Airway - Non-Surgical 12/14/21 1047 2 days          Peripheral Intravenous Line                 Peripheral IV - Single Lumen 18 G Left;Posterior Wrist -- days         Peripheral IV - Single Lumen 12/13/21 1812 20 G Right Wrist 3 days         Peripheral IV - Single Lumen 12/15/21 0327 20 G Anterior;Distal;Right Upper Arm 2 days                Significant Labs:    CBC/Anemia Profile:  Recent Labs   Lab 12/16/21  0432   WBC 7.81   HGB 8.9*   HCT 25.9*   *   MCV 85.5   RDW 14.8*        Chemistries:  Recent Labs   Lab 12/16/21  0432 12/17/21  0916    140   K 3.8 3.5    102   CO2 16* 21   BUN 76* 61*   CREATININE 8.21* 6.67*   CALCIUM 6.8* 7.2*   ALBUMIN 1.2*  --    PROT 5.9*  --    BILITOT 1.2  --    ALKPHOS 113  --    ALT 43  --    *  --    MG  --  2.4*   PHOS  --  5.0*        All pertinent labs within the past 24 hours have been reviewed.    Significant Imaging:  I have reviewed all pertinent imaging results/findings within the past 24 hours.    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 The estimated ejection fraction is 40%. Left ventricular diastolic dysfunction.  - weaning pressors  - abx per ID  - OR tomorrow        Antiplatelet or antithrombotic long-term use  - brillinta in home medications, unsure exactly why  - will try to confirm this, resume ASAP when safe from surgical standpoint    Hypocalcemia  - 4 gm IV today     RAHAT (acute kidney injury)  - nephrology consulted for rapid acute rise in Cr  - anuric  - Underwent HD 12/15 and 12/16, 12/17  - right IJ HD line       On mechanically assisted ventilation  - remained intubated post-operatively  - continue with high MV to help with compensation, repeat ABG after HD    Denice gangrene  - s/p multiple debridements  - wound vac in place  - ID consulted for antibiotic management: merrem, daptomycin, clindamycin  - cultures with E Coli, has been changed from merrem to rocephin  - resume enteral nutrition  - Stop feeding at midnight    Hypertension  - now on pressors, holding metoprolol 25 mg BID        Remains critically ill with septic shock. Weaning pressors. Plan for OR tomorrow. Abx de-escalated per ID to rocephin. Continue feeding today, try to get to goal. Stop feedings at MN for OR in am. Critical care time: 40 minutes.          Raul Hall MD  Pulmonology  [facility]

## 2021-12-17 NOTE — PROGRESS NOTES
General surgery    Hold tube feeds at [place]  Consent for dr pacheco for wash out tomorrow in or saturday

## 2021-12-17 NOTE — ASSESSMENT & PLAN NOTE
- secondary to Denice gangrene  - echo: The left ventricle is normal in size with mild concentric hypertrophy and mildly decreased systolic function. The estimated ejection fraction is 40%. Left ventricular diastolic dysfunction.  - weaning pressors  - abx per ID  - OR tomorrow

## 2021-12-17 NOTE — ASSESSMENT & PLAN NOTE
- s/p multiple debridements  - wound vac in place  - ID consulted for antibiotic management: merrem, daptomycin, clindamycin  - cultures with E Coli, has been changed from merrem to rocephin  - resume enteral nutrition  - Stop feeding at midnight

## 2021-12-17 NOTE — PLAN OF CARE
Problem: Infection  Goal: Absence of Infection Signs and Symptoms  Outcome: Ongoing, Progressing     Problem: Adult Inpatient Plan of Care  Goal: Plan of Care Review  Outcome: Ongoing, Progressing  Goal: Patient-Specific Goal (Individualized)  Outcome: Ongoing, Progressing  Goal: Absence of Hospital-Acquired Illness or Injury  Outcome: Ongoing, Progressing  Goal: Optimal Comfort and Wellbeing  Outcome: Ongoing, Progressing  Goal: Readiness for Transition of Care  Outcome: Ongoing, Progressing     Problem: Fall Injury Risk  Goal: Absence of Fall and Fall-Related Injury  Outcome: Ongoing, Progressing     Problem: Skin Injury Risk Increased  Goal: Skin Health and Integrity  Outcome: Ongoing, Progressing     Problem: Fluid and Electrolyte Imbalance (Acute Kidney Injury/Impairment)  Goal: Fluid and Electrolyte Balance  Outcome: Ongoing, Progressing     Problem: Oral Intake Inadequate (Acute Kidney Injury/Impairment)  Goal: Optimal Nutrition Intake  Outcome: Ongoing, Progressing     Problem: Renal Function Impairment (Acute Kidney Injury/Impairment)  Goal: Effective Renal Function  Outcome: Ongoing, Progressing     Problem: Communication Impairment (Mechanical Ventilation, Invasive)  Goal: Effective Communication  Outcome: Ongoing, Progressing     Problem: Device-Related Complication Risk (Mechanical Ventilation, Invasive)  Goal: Optimal Device Function  Outcome: Ongoing, Progressing     Problem: Inability to Wean (Mechanical Ventilation, Invasive)  Goal: Mechanical Ventilation Liberation  Outcome: Ongoing, Progressing     Problem: Nutrition Impairment (Mechanical Ventilation, Invasive)  Goal: Optimal Nutrition Delivery  Outcome: Ongoing, Progressing     Problem: Skin and Tissue Injury (Mechanical Ventilation, Invasive)  Goal: Absence of Device-Related Skin and Tissue Injury  Outcome: Ongoing, Progressing     Problem: Ventilator-Induced Lung Injury (Mechanical Ventilation, Invasive)  Goal: Absence of Ventilator-Induced  Lung Injury  Outcome: Ongoing, Progressing     Problem: Communication Impairment (Artificial Airway)  Goal: Effective Communication  Outcome: Ongoing, Progressing     Problem: Device-Related Complication Risk (Artificial Airway)  Goal: Optimal Device Function  Outcome: Ongoing, Progressing     Problem: Skin and Tissue Injury (Artificial Airway)  Goal: Absence of Device-Related Skin or Tissue Injury  Outcome: Ongoing, Progressing     Problem: Noninvasive Ventilation Acute  Goal: Effective Unassisted Ventilation and Oxygenation  Outcome: Ongoing, Progressing     Problem: Device-Related Complication Risk (Hemodialysis)  Goal: Safe, Effective Therapy Delivery  Outcome: Ongoing, Progressing     Problem: Hemodynamic Instability (Hemodialysis)  Goal: Effective Tissue Perfusion  Outcome: Ongoing, Progressing     Problem: Infection (Hemodialysis)  Goal: Absence of Infection Signs and Symptoms  Outcome: Ongoing, Progressing     Problem: Adjustment to Illness (Sepsis/Septic Shock)  Goal: Optimal Coping  Outcome: Ongoing, Progressing     Problem: Bleeding (Sepsis/Septic Shock)  Goal: Absence of Bleeding  Outcome: Ongoing, Progressing     Problem: Glycemic Control Impaired (Sepsis/Septic Shock)  Goal: Blood Glucose Level Within Desired Range  Outcome: Ongoing, Progressing     Problem: Infection Progression (Sepsis/Septic Shock)  Goal: Absence of Infection Signs and Symptoms  Outcome: Ongoing, Progressing     Problem: Nutrition Impaired (Sepsis/Septic Shock)  Goal: Optimal Nutrition Intake  Outcome: Ongoing, Progressing     Problem: Restraint, Nonbehavioral (Nonviolent)  Goal: Absence of Harm or Injury  Outcome: Ongoing, Progressing

## 2021-12-17 NOTE — ASSESSMENT & PLAN NOTE
- brillinta in home medications, unsure exactly why  - will try to confirm this, resume ASAP when safe from surgical standpoint

## 2021-12-17 NOTE — DIALYSIS ROUNDING
"          Nephrology Department            NAME: Og Garcia   YOB: 1955  MRN: [id number]  NOTE DATE: 12/17/2021       Seen on dialysis in the ICU setting.  He is on pressor medications.  He is tolerating the procedure..    Patient complains:  None.      REVIEW OF SYSTEMS:  he reports no shortness of breath, nausea or vomiting. No acute changes.    VITALS:  height is 5' 11" (1.803 m) and weight is 100.5 kg (221 lb 9 oz). His temperature is 99.1 °F (37.3 °C). His blood pressure is 103/61 and his pulse is 77. His respiration is 35 (abnormal) and oxygen saturation is 100%.  Hemodialysis documentation flowsheet reviewed with dialysis nurse, including weight and vitals.    Resting comfortably, NAD.  The patient is ventilatedRespiration unlabored. Lungs clear.  Heart regular, no rub.  Abdomen abdominal wound  No edema.    Recent Labs   Lab 12/15/21  0436 12/16/21  0432 12/17/21  0916    141 140   K 4.5 3.8 3.5    105 102   CO2 13* 16* 21   BUN 78* 76* 61*   CREATININE 7.90* 8.21* 6.67*   CALCIUM 5.4* 6.8* 7.2*   PHOS  --   --  5.0*     Recent Labs   Lab 12/14/21  0016 12/14/21  1333 12/16/21  0432   WBC 11.09* 8.45 7.81   HGB 10.7* 11.7* 8.9*   HCT 30.3* 33.4* 25.9*    184 136*       ASSESSMENT/PLAN:  Continue with scheduled hemodialysis for this patient.  The situation remains guarded.  Edwin Pryor Jr, MD  "

## 2021-12-17 NOTE — PROGRESS NOTES
Progress Note                                                           Infectious disease   Admit Date: 12/13/2021    SUBJECTIVE:     Follow-up For:  Septic shock    HPI/Interval history:  Patient doing fair, had repeat debridement of abdominal wound yesterday. T-max 101.3° last night.    Still on vasopressors.    Review of Systems:    Unable to obtain as patient is sedated on the vent      OBJECTIVE:     Vital Signs Range (Last 24H):  Temp:  [98.4 °F (36.9 °C)-101.3 °F (38.5 °C)]   Pulse:  [67-89]   Resp:  [35]   BP: ()/(47-61)   SpO2:  [96 %-100 %]     Physical Exam:  Constitutional:  Sedated on the vent   HENT: supple   Head: normocephalic, atraumatic   Cardiovascular: regular rate and rhythm, S1, S2 normal, no murmur, click, rub or gallop  Pulmonary:no crepitations or wheezing appreciated  Gastrointestinal:  Slightly distended, midline wound with VAC putting out serosanguineous fluid, bowel sounds not heard  :  Scant amount of concentrated urine from [place] catheter  Muscular/Skeletal: Lower extremities without cyanosis or edema, no clubbing  Skin: Skin color, texture normal. No rashes, ulcers or new lesions. Skin warm and dry.     Laboratory:  CBC:   Recent Labs   Lab 12/17/21  0916   WBC 8.20   RBC 2.74*   HGB 7.9*   HCT 23.4*      MCV 85.4   MCH 28.8   MCHC 33.8     BMP:   Recent Labs   Lab 12/17/21  0916   *      K 3.5      CO2 21   BUN 61*   CREATININE 6.67*   CALCIUM 7.2*   MG 2.4*     CMP:   Recent Labs   Lab 12/16/21  0432 12/16/21  0432 12/17/21  0916   *   < > 128*   CALCIUM 6.8*   < > 7.2*   ALBUMIN 1.2*  --   --    PROT 5.9*  --   --       < > 140   K 3.8   < > 3.5   CO2 16*   < > 21      < > 102   BUN 76*   < > 61*   CREATININE 8.21*   < > 6.67*   ALKPHOS 113  --   --    ALT 43  --   --    *  --   --    BILITOT 1.2  --   --     < > = values in this interval not displayed.     Microbiology Results (last 7 days)     Procedure Component Value  Units Date/Time    Culture, Wound [[id number]]  (Abnormal)  (Susceptibility) Collected: 12/14/21 1243    Order Status: Completed Specimen: Wound from Abdomen Updated: 12/17/21 0926     Culture, Wound/Abscess Rare growth Escherichia coli    Blood culture #1 **CANNOT BE ORDERED STAT** [[id number]] Collected: 12/13/21 2112    Order Status: Completed Specimen: Blood Updated: 12/17/21 0738     Culture, Blood No Growth At 72 Hours    Blood culture #2 **CANNOT BE ORDERED STAT** [[id number]] Collected: 12/13/21 2117    Order Status: Completed Specimen: Blood Updated: 12/17/21 0738     Culture, Blood No Growth At 72 Hours    Culture, Wound [[id number]]  (Abnormal)  (Susceptibility) Collected: 12/13/21 2315    Order Status: Completed Specimen: Abscess from Perineum Updated: 12/16/21 0849     Culture, Wound/Abscess Light Growth Escherichia coli    Urine culture [[id number]] Collected: 12/13/21 2133    Order Status: Completed Specimen: Urine Updated: 12/15/21 0729     Culture, Urine No Growth    Culture, Anaerobe [[id number]] Collected: 12/14/21 1242    Order Status: Resulted Specimen: Abscess from Abdomen Updated: 12/14/21 1242    Culture, Anaerobe [[id number]] Collected: 12/13/21 2315    Order Status: Resulted Specimen: Abscess from Perineum Updated: 12/13/21 2359          Diagnostic Results:  Labs: Reviewed    ASSESSMENT/PLAN:     Active Hospital Problems    Diagnosis  POA    *Septic shock [A41.9, R65.21]  No    Antiplatelet or antithrombotic long-term use [Z79.02]  Not Applicable    Hypocalcemia [E83.51]  Yes    On mechanically assisted ventilation [Z99.11]  Not Applicable    RAHAT (acute kidney injury) [N17.9]  Yes    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       ASSESSMENT:  1. Necrotizing fasciitis involving perineum and extending up into pelvis.  So far only E coli isolated.  2. Septic shock due to #1  3. Acute renal failure,  now on  dialysis  4. DM, likely uncontrolled        PLAN:  1. Discontinue daptomycin since no resistant gram positives isolated  2. Continue ceftriaxone and clindamycin   Patient going for repeat abdominal washout tomorrow    Discussed with Dr. Hall

## 2021-12-17 NOTE — ASSESSMENT & PLAN NOTE
- nephrology consulted for rapid acute rise in Cr  - anuric  - Underwent HD 12/15 and 12/16, 12/17  - right IJ HD line

## 2021-12-17 NOTE — PLAN OF CARE
Problem: Infection  Goal: Absence of Infection Signs and Symptoms  Outcome: Ongoing, Progressing     Problem: Adult Inpatient Plan of Care  Goal: Plan of Care Review  Outcome: Ongoing, Progressing  Goal: Patient-Specific Goal (Individualized)  Outcome: Ongoing, Progressing  Goal: Absence of Hospital-Acquired Illness or Injury  Outcome: Ongoing, Progressing  Goal: Optimal Comfort and Wellbeing  Outcome: Ongoing, Progressing  Goal: Readiness for Transition of Care  Outcome: Ongoing, Progressing     Problem: Fall Injury Risk  Goal: Absence of Fall and Fall-Related Injury  Outcome: Ongoing, Progressing     Problem: Skin Injury Risk Increased  Goal: Skin Health and Integrity  Outcome: Ongoing, Progressing     Problem: Fluid and Electrolyte Imbalance (Acute Kidney Injury/Impairment)  Goal: Fluid and Electrolyte Balance  Outcome: Ongoing, Not Progressing     Problem: Oral Intake Inadequate (Acute Kidney Injury/Impairment)  Goal: Optimal Nutrition Intake  Outcome: Ongoing, Progressing     Problem: Renal Function Impairment (Acute Kidney Injury/Impairment)  Goal: Effective Renal Function  Outcome: Ongoing, Not Progressing     Problem: Communication Impairment (Mechanical Ventilation, Invasive)  Goal: Effective Communication  Outcome: Ongoing, Progressing     Problem: Device-Related Complication Risk (Mechanical Ventilation, Invasive)  Goal: Optimal Device Function  Outcome: Ongoing, Progressing     Problem: Inability to Wean (Mechanical Ventilation, Invasive)  Goal: Mechanical Ventilation Liberation  Outcome: Ongoing, Progressing     Problem: Nutrition Impairment (Mechanical Ventilation, Invasive)  Goal: Optimal Nutrition Delivery  Outcome: Ongoing, Progressing     Problem: Skin and Tissue Injury (Mechanical Ventilation, Invasive)  Goal: Absence of Device-Related Skin and Tissue Injury  Outcome: Ongoing, Progressing     Problem: Ventilator-Induced Lung Injury (Mechanical Ventilation, Invasive)  Goal: Absence of  Ventilator-Induced Lung Injury  Outcome: Ongoing, Progressing     Problem: Communication Impairment (Artificial Airway)  Goal: Effective Communication  Outcome: Ongoing, Progressing     Problem: Device-Related Complication Risk (Artificial Airway)  Goal: Optimal Device Function  Outcome: Ongoing, Progressing     Problem: Skin and Tissue Injury (Artificial Airway)  Goal: Absence of Device-Related Skin or Tissue Injury  Outcome: Ongoing, Progressing     Problem: Noninvasive Ventilation Acute  Goal: Effective Unassisted Ventilation and Oxygenation  Outcome: Ongoing, Progressing     Problem: Adjustment to Illness (Sepsis/Septic Shock)  Goal: Optimal Coping  Outcome: Ongoing, Progressing     Problem: Bleeding (Sepsis/Septic Shock)  Goal: Absence of Bleeding  Outcome: Ongoing, Progressing     Problem: Glycemic Control Impaired (Sepsis/Septic Shock)  Goal: Blood Glucose Level Within Desired Range  Outcome: Ongoing, Progressing     Problem: Infection Progression (Sepsis/Septic Shock)  Goal: Absence of Infection Signs and Symptoms  Outcome: Ongoing, Progressing     Problem: Restraint, Nonbehavioral (Nonviolent)  Goal: Absence of Harm or Injury  Outcome: Ongoing, Progressing

## 2021-12-17 NOTE — SUBJECTIVE & OBJECTIVE
Interval History: OR yesterday with further debridement. Remains on pressors today. Currently undergoing HD.    Objective:     Vital Signs (Most Recent):  Temp: 99.5 °F (37.5 °C) (12/17/21 1030)  Pulse: 75 (12/17/21 1030)  Resp: (!) 35 (12/17/21 1030)  BP: (!) 98/59 (12/17/21 1030)  SpO2: 100 % (12/17/21 1030) Vital Signs (24h Range):  Temp:  [97.7 °F (36.5 °C)-101.3 °F (38.5 °C)] 99.5 °F (37.5 °C)  Pulse:  [59-89] 75  Resp:  [34-35] 35  SpO2:  [94 %-100 %] 100 %  BP: ()/(47-61) 98/59     Weight: 100.5 kg (221 lb 9 oz)  Body mass index is 30.9 kg/m².      Intake/Output Summary (Last 24 hours) at 12/17/2021 1046  Last data filed at 12/17/2021 1000  Gross per 24 hour   Intake 1642.68 ml   Output 2340 ml   Net -697.32 ml       Physical Exam  Vitals reviewed.   Constitutional:       Appearance: He is ill-appearing.      Comments: Intubated and sedated   HENT:      Head:      Comments: Right temporal deformity     Nose: Nose normal.      Mouth/Throat:      Mouth: Mucous membranes are moist.   Eyes:      Conjunctiva/sclera: Conjunctivae normal.      Pupils: Pupils are equal, round, and reactive to light.   Cardiovascular:      Rate and Rhythm: Normal rate and regular rhythm.      Pulses: Normal pulses.      Heart sounds: Normal heart sounds.   Pulmonary:      Effort: No respiratory distress.      Breath sounds: No stridor. No wheezing, rhonchi or rales.      Comments: Ventilator driven breath sounds bilaterally  Abdominal:      Palpations: Abdomen is soft.      Tenderness: There is no guarding.      Comments: Anterior abdominal wall with wound vac in place   Genitourinary:     Comments: Packing in place  Musculoskeletal:         General: No swelling or deformity.      Cervical back: Neck supple.      Right lower leg: No edema.      Left lower leg: No edema.   Lymphadenopathy:      Cervical: No cervical adenopathy.   Skin:     General: Skin is warm and dry.   Neurological:      Cranial Nerves: No cranial nerve  deficit.      Comments: Intubated and sedated         Vents:  Vent Mode: A/C (12/17/21 0821)  Set Rate: 35 BPM (12/17/21 0821)  Vt Set: 480 mL (12/17/21 0821)  PEEP/CPAP: 5 cmH20 (12/17/21 0821)  Oxygen Concentration (%): 30 (12/17/21 1030)  Peak Airway Pressure: 26 cmH2O (12/17/21 0821)  Plateau Pressure: 11 cmH20 (12/14/21 2330)  Total Ve: 14 mL (12/17/21 0821)  F/VT Ratio<105 (RSBI): (!) 92.84 (12/16/21 1506)    Lines/Drains/Airways     Central Venous Catheter Line                 Hemodialysis Catheter 12/15/21 0745 right internal jugular 2 days          Drain                 NG/OG Tube [place] sump 18 Fr. Left nostril -- days         Urethral Catheter 12/13/21 2130 16 Fr. 3 days         Open Drain 12/14/21 1220 Inferior;Midline Abdomen Penrose 1/2 inch 2 days          Airway                 Airway - Non-Surgical 12/14/21 1047 2 days          Peripheral Intravenous Line                 Peripheral IV - Single Lumen 18 G Left;Posterior Wrist -- days         Peripheral IV - Single Lumen 12/13/21 1812 20 G Right Wrist 3 days         Peripheral IV - Single Lumen 12/15/21 0327 20 G Anterior;Distal;Right Upper Arm 2 days                Significant Labs:    CBC/Anemia Profile:  Recent Labs   Lab 12/16/21  0432   WBC 7.81   HGB 8.9*   HCT 25.9*   *   MCV 85.5   RDW 14.8*        Chemistries:  Recent Labs   Lab 12/16/21  0432 12/17/21  0916    140   K 3.8 3.5    102   CO2 16* 21   BUN 76* 61*   CREATININE 8.21* 6.67*   CALCIUM 6.8* 7.2*   ALBUMIN 1.2*  --    PROT 5.9*  --    BILITOT 1.2  --    ALKPHOS 113  --    ALT 43  --    *  --    MG  --  2.4*   PHOS  --  5.0*       All pertinent labs within the past 24 hours have been reviewed.    Significant Imaging:  I have reviewed all pertinent imaging results/findings within the past 24 hours.

## 2021-12-17 NOTE — PLAN OF CARE
Patient admitted with gangrene. For surgery tomorrow. Adjusting meds and NPO at MN. Continue to follow for d/c assistance prn.

## 2021-12-17 NOTE — PLAN OF CARE
Problem: Communication Impairment (Artificial Airway)  Goal: Effective Communication  Outcome: Ongoing, Progressing     Problem: Device-Related Complication Risk (Artificial Airway)  Goal: Optimal Device Function  Outcome: Ongoing, Progressing     Problem: Skin and Tissue Injury (Artificial Airway)  Goal: Absence of Device-Related Skin or Tissue Injury  Outcome: Ongoing, Progressing

## 2021-12-18 NOTE — ASSESSMENT & PLAN NOTE
- secondary to Denice gangrene  - echo: The left ventricle is normal in size with mild concentric hypertrophy and mildly decreased systolic function. The estimated ejection fraction is 40%. Left ventricular diastolic dysfunction.  - weaning pressors  - abx per ID

## 2021-12-18 NOTE — PLAN OF CARE
Problem: Communication Impairment (Mechanical Ventilation, Invasive)  Goal: Effective Communication  Outcome: Ongoing, Progressing     Problem: Device-Related Complication Risk (Mechanical Ventilation, Invasive)  Goal: Optimal Device Function  Outcome: Ongoing, Progressing     Problem: Inability to Wean (Mechanical Ventilation, Invasive)  Goal: Mechanical Ventilation Liberation  Outcome: Ongoing, Progressing     Problem: Nutrition Impairment (Mechanical Ventilation, Invasive)  Goal: Optimal Nutrition Delivery  Outcome: Ongoing, Progressing     Problem: Skin and Tissue Injury (Mechanical Ventilation, Invasive)  Goal: Absence of Device-Related Skin and Tissue Injury  Outcome: Ongoing, Progressing     Problem: Ventilator-Induced Lung Injury (Mechanical Ventilation, Invasive)  Goal: Absence of Ventilator-Induced Lung Injury  Outcome: Ongoing, Progressing     Problem: Communication Impairment (Artificial Airway)  Goal: Effective Communication  Outcome: Ongoing, Progressing     Problem: Device-Related Complication Risk (Artificial Airway)  Goal: Optimal Device Function  Outcome: Ongoing, Progressing     Problem: Skin and Tissue Injury (Artificial Airway)  Goal: Absence of Device-Related Skin or Tissue Injury  Outcome: Ongoing, Progressing

## 2021-12-18 NOTE — ASSESSMENT & PLAN NOTE
- remained intubated post-operatively; has returned to the OR multiple times  - able to decrease MV after HD  - repeat ABG in am

## 2021-12-18 NOTE — BRIEF OP NOTE
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 EBL = 200.         Specimens:   Specimen (24h ago, onward)             Start     Ordered    12/18/21 1047  Surgical Pathology  RELEASE UPON ORDERING         12/18/21 1047                [id number]

## 2021-12-18 NOTE — NURSING
0700: report received from PM RN. Pt resting quietly on vent at this time. Fentanyl, Diprivan, and Levophed infusing. Plans to return to surgery today per note.     0800: Dr. Hui in unit to see pt. Family notified and  obtained consent via phone for surgery procedure.     0900: , anesthesia, in unit to take patient to surgery. Pt released to surgery care at this time. Pt out of unit.    1130: Pt back to unit from surgery at this time. New ostomy noted to LLQ. Wound Vac in place. Pt remains sedated on Diprivan and Fentanyl. Levophed infusing.

## 2021-12-18 NOTE — OP NOTE
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]    Anesthesia: General    Operative Findings (including complications, if any): necrotic perirectal tissue and muscle    Description of Technical Procedures: sterile prep anddrape, sharply excised necrotic tissue with scissors and scapel, irrigated with daline  And abx, paked perirectal space with kerlex, next elected to perform colostomy, mobilized sigmoid colon, transected and  Brought end out left lower abdomen after excising sircular skin defect and sub cutaneous fat, crescent defect made in fascia and muscle spreading of muscle,  Secured with vicryl suture, closed peritoneum only of abdominal wall, sharply excised necrotic muscle and packed with kerlex. Negative pressure wound vac placed    Significant Surgical Tasks Conducted by the Assistant(s), if Applicable: none    Estimated Blood Loss (EBL): * No values recorded between 12/18/2021  9:36 AM and 12/18/2021 11:08 AM *           Implants: * No implants in log *    Specimens:   Specimen (24h ago, onward)             Start     Ordered    12/18/21 1047  Surgical Pathology  RELEASE UPON ORDERING         12/18/21 1047                        Condition: Good    Disposition: PACU - hemodynamically stable.    Attestation: I was present and scrubbed for the entire procedure.

## 2021-12-18 NOTE — SUBJECTIVE & OBJECTIVE
Interval History: taken to OR today for further debridement and creation of colostomy. Since return has been stable and weaning pressors.     Objective:     Vital Signs (Most Recent):  Temp: 97.3 °F (36.3 °C) (12/18/21 1615)  Pulse: 88 (12/18/21 1615)  Resp: 20 (12/18/21 1615)  BP: (!) 96/52 (12/18/21 1615)  SpO2: 99 % (12/18/21 1615) Vital Signs (24h Range):  Temp:  [97 °F (36.1 °C)-100.8 °F (38.2 °C)] 97.3 °F (36.3 °C)  Pulse:  [] 88  Resp:  [10-21] 20  SpO2:  [96 %-100 %] 99 %  BP: ()/(50-68) 96/52     Weight: 97.7 kg (215 lb 6.2 oz)  Body mass index is 30.04 kg/m².      Intake/Output Summary (Last 24 hours) at 12/18/2021 1649  Last data filed at 12/18/2021 1127  Gross per 24 hour   Intake 1538.33 ml   Output 320 ml   Net 1218.33 ml       Physical Exam  Vitals reviewed.   Constitutional:       Appearance: He is ill-appearing.      Comments: Intubated and sedated   HENT:      Head:      Comments: Right temporal deformity     Nose: Nose normal.      Mouth/Throat:      Mouth: Mucous membranes are moist.   Eyes:      Conjunctiva/sclera: Conjunctivae normal.      Pupils: Pupils are equal, round, and reactive to light.   Cardiovascular:      Rate and Rhythm: Normal rate and regular rhythm.      Pulses: Normal pulses.      Heart sounds: Normal heart sounds.   Pulmonary:      Effort: No respiratory distress.      Breath sounds: No stridor. No wheezing, rhonchi or rales.      Comments: Ventilator driven breath sounds bilaterally  Abdominal:      Palpations: Abdomen is soft.      Tenderness: There is no guarding.      Comments: Anterior abdominal wall with wound vac in place. Ostomy   Genitourinary:     Comments: Packing in place  Musculoskeletal:         General: No swelling or deformity.      Cervical back: Neck supple.      Right lower leg: No edema.      Left lower leg: No edema.   Lymphadenopathy:      Cervical: No cervical adenopathy.   Skin:     General: Skin is warm and dry.   Neurological:      Cranial  Nerves: No cranial nerve deficit.      Comments: Intubated and sedated         Vents:  Vent Mode: A/C (12/18/21 1412)  Set Rate: 20 BPM (12/18/21 1412)  Vt Set: 480 mL (12/18/21 1412)  PEEP/CPAP: 5 cmH20 (12/18/21 1412)  Oxygen Concentration (%): 30 (12/18/21 1412)  Peak Airway Pressure: 32 cmH2O (12/18/21 1412)  Plateau Pressure: 11 cmH20 (12/14/21 2330)  Total Ve: 7.3 mL (12/18/21 1412)  F/VT Ratio<105 (RSBI): (!) 44.44 (12/18/21 0800)    Lines/Drains/Airways     Central Venous Catheter Line                 Hemodialysis Catheter 12/15/21 0745 right internal jugular 3 days          Drain                 NG/OG Tube [place] sump 18 Fr. Left nostril -- days         Open Drain 12/14/21 1220 Inferior;Midline Abdomen Penrose 1/2 inch 4 days         Urethral Catheter 12/13/21 2130 16 Fr. 4 days         Colostomy 12/18/21 1030 Descending/sigmoid LUQ <1 day          Airway                 Airway - Non-Surgical 12/14/21 1047 4 days          Peripheral Intravenous Line                 Peripheral IV - Single Lumen 12/13/21 1812 20 G Right Wrist 4 days         Peripheral IV - Single Lumen 12/15/21 0327 20 G Anterior;Distal;Right Upper Arm 3 days         Peripheral IV - Single Lumen 12/18/21 0533 20 G Left;Posterior Forearm <1 day                Significant Labs:    CBC/Anemia Profile:  Recent Labs   Lab 12/17/21  0916 12/18/21  0508   WBC 8.20 15.38*   HGB 7.9* 8.4*   HCT 23.4* 24.4*    185   MCV 85.4 84.1   RDW 14.6* 14.6*        Chemistries:  Recent Labs   Lab 12/17/21  0916 12/18/21  0508    137   K 3.5 4.3    96*   CO2 21 22   BUN 61* 56*   CREATININE 6.67* 6.35*   CALCIUM 7.2* 8.0*   MG 2.4*  --    PHOS 5.0*  --        All pertinent labs within the past 24 hours have been reviewed.    Significant Imaging:  I have reviewed all pertinent imaging results/findings within the past 24 hours.

## 2021-12-18 NOTE — ASSESSMENT & PLAN NOTE
- s/p multiple debridements  - wound vac in place  - ID consulted for antibiotic management: rocephin, daptomycin, clindamycin

## 2021-12-18 NOTE — PROGRESS NOTES
Patient is intubated and sedate.    Physical exam general the patient is chronically ill-appearing, heart is regular rate and rhythm, he has no edema, lungs are clear to auscultation anteriorly, abdomen is soft with decreased bowel sounds      Assessment/plan 1. Acute renal failure requiring hemodialysis, patient continues anuric  2. 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 Anemia-patient's hematocrit is around 24%, continue to monitor

## 2021-12-19 NOTE — PROGRESS NOTES
Progress Note                                                           Infectious disease   Admit Date: 12/13/2021    SUBJECTIVE:     Follow-up For:  Septic shock    HPI/Interval history:  Patient doing fair, went back to the operating room overnight and had further debridement of perineum, removal of the necrotic muscle, diverting colostomy.  He has lower vasopressor requirements today.  Temperature curve down with T-max 99°.    Review of Systems:    Unable to obtain as patient is sedated on the vent      OBJECTIVE:     Vital Signs Range (Last 24H):  Temp:  [97 °F (36.1 °C)-99.3 °F (37.4 °C)]   Pulse:  [77-94]   Resp:  [10-22]   BP: ()/(43-67)   SpO2:  [98 %-100 %]     Physical Exam:  Constitutional:  Sedated on the vent but he is actually arousable today, open his eyes with me for the 1st time  HENT: supple   Head: normocephalic, atraumatic   Cardiovascular: regular rate and rhythm, S1, S2 normal, no murmur, click, rub or gallop  Pulmonary:no crepitations or wheezing appreciated  Gastrointestinal:  Colostomy present with scant blood in back, midline wound with VAC putting out serosanguineous fluid, bowel sounds not heard  :  Scant amount of concentrated urine from [place] catheter, packing in groin surgical wound  Muscular/Skeletal: Lower extremities with edema, no clubbing  Skin: Skin color, texture normal. No rashes, ulcers or new lesions. Skin warm and dry.     Laboratory:  CBC:   Recent Labs   Lab 12/19/21  0555   WBC 16.32*   RBC 2.97*   HGB 8.8*   HCT 25.0*      MCV 84.2   MCH 29.6   MCHC 35.2     BMP:   Recent Labs   Lab 12/17/21  0916 12/18/21  0508 12/19/21  0555   *   < > 182*      < > 132*   K 3.5   < > 5.2*      < > 96*   CO2 21   < > 21   BUN 61*   < > 82*   CREATININE 6.67*   < > 7.91*   CALCIUM 7.2*   < > 7.3*   MG 2.4*  --   --     < > = values in this interval not displayed.     CMP:   Recent Labs   Lab 12/16/21  0432 12/17/21  0916 12/19/21  0555   *   < >  182*   CALCIUM 6.8*   < > 7.3*   ALBUMIN 1.2*  --   --    PROT 5.9*  --   --       < > 132*   K 3.8   < > 5.2*   CO2 16*   < > 21      < > 96*   BUN 76*   < > 82*   CREATININE 8.21*   < > 7.91*   ALKPHOS 113  --   --    ALT 43  --   --    *  --   --    BILITOT 1.2  --   --     < > = values in this interval not displayed.     Microbiology Results (last 7 days)     Procedure Component Value Units Date/Time    Blood culture #1 **CANNOT BE ORDERED STAT** [[id number]] Collected: 12/13/21 2112    Order Status: Completed Specimen: Blood Updated: 12/19/21 0754     Culture, Blood No Growth at 5 days    Blood culture #2 **CANNOT BE ORDERED STAT** [[id number]] Collected: 12/13/21 2117    Order Status: Completed Specimen: Blood Updated: 12/19/21 0754     Culture, Blood No Growth at 5 days    Culture, Anaerobe [[id number]]  (Abnormal) Collected: 12/14/21 1242    Order Status: Completed Specimen: Abscess from Abdomen Updated: 12/18/21 1436     Culture, Anaerobe Bacteroides thetaiotaomicron    Culture, Anaerobe [[id number]]  (Abnormal) Collected: 12/13/21 2315    Order Status: Completed Specimen: Abscess from Perineum Updated: 12/18/21 1434     Culture, Anaerobe Bacteroides thetaiotaomicron      Clostridium clostridioforme    Culture, Wound [[id number]]  (Abnormal)  (Susceptibility) Collected: 12/14/21 1243    Order Status: Completed Specimen: Wound from Abdomen Updated: 12/17/21 0926     Culture, Wound/Abscess Rare growth Escherichia coli    Culture, Wound [[id number]]  (Abnormal)  (Susceptibility) Collected: 12/13/21 2315    Order Status: Completed Specimen: Abscess from Perineum Updated: 12/16/21 0849     Culture, Wound/Abscess Light Growth Escherichia coli    Urine culture [[id number]] Collected: 12/13/21 2133    Order Status: Completed Specimen: Urine Updated: 12/15/21 0729     Culture, Urine No Growth          Diagnostic Results:  Labs: Reviewed    ASSESSMENT/PLAN:     Active Hospital Problems    Diagnosis  POA     *Septic shock [A41.9, R65.21]  No    Antiplatelet or antithrombotic long-term use [Z79.02]  Not Applicable    Hypocalcemia [E83.51]  Yes    On mechanically assisted ventilation [Z99.11]  Not Applicable    RAHAT (acute kidney injury) [N17.9]  Yes    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       ASSESSMENT:  1. Necrotizing fasciitis involving perineum and extending up into pelvis.  E coli along with several anaerobes including Clostridium species isolated.  2. Septic shock due to #1  3. Acute renal failure, anuric and now on dialysis  4. DM, likely uncontrolled        PLAN:  1. Since no other g negatives isolated will change from ceftriaxone to ampicillin to offer coverage for Enterococcus, and add gentamicin  2. Continue clindamycin     Discussed with Dr. Hall

## 2021-12-19 NOTE — ASSESSMENT & PLAN NOTE
- nephrology consulted for rapid acute rise in Cr  - remains anuric  - Underwent HD 12/15 and 12/16, 12/17, likely HD today  - right IJ HD line

## 2021-12-19 NOTE — PROGRESS NOTES
Patient is intubated and sedate.    Physical exam general the patient is chronically ill-appearing, heart is regular rate and rhythm, he has no edema, lungs are clear to auscultation anteriorly, abdomen is soft with decreased bowel sounds      Assessment/plan 1. Acute renal failure requiring hemodialysis  2. 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 Anemia-patient's hematocrit is around 24%, continue to monitor

## 2021-12-19 NOTE — SUBJECTIVE & OBJECTIVE
Interval History: Remains on low dose vasopressors. Plan for OR tomorrow.     Objective:     Vital Signs (Most Recent):  Temp: 98 °F (36.7 °C) (12/19/21 0725)  Pulse: 77 (12/19/21 0700)  Resp: 20 (12/19/21 0700)  BP: 97/61 (12/19/21 0700)  SpO2: 100 % (12/19/21 0725) Vital Signs (24h Range):  Temp:  [97 °F (36.1 °C)-99.3 °F (37.4 °C)] 98 °F (36.7 °C)  Pulse:  [77-94] 77  Resp:  [10-22] 20  SpO2:  [98 %-100 %] 100 %  BP: ()/(43-67) 97/61     Weight: 99.1 kg (218 lb 7.6 oz)  Body mass index is 30.47 kg/m².      Intake/Output Summary (Last 24 hours) at 12/19/2021 1015  Last data filed at 12/19/2021 0601  Gross per 24 hour   Intake 1361.6 ml   Output 15 ml   Net 1346.6 ml       Physical Exam  Vitals reviewed.   Constitutional:       Appearance: He is ill-appearing.      Comments: Intubated and sedated   HENT:      Head:      Comments: Right temporal deformity     Nose: Nose normal.      Mouth/Throat:      Mouth: Mucous membranes are moist.   Eyes:      Conjunctiva/sclera: Conjunctivae normal.      Pupils: Pupils are equal, round, and reactive to light.   Cardiovascular:      Rate and Rhythm: Normal rate and regular rhythm.      Pulses: Normal pulses.      Heart sounds: Normal heart sounds.   Pulmonary:      Effort: No respiratory distress.      Breath sounds: No stridor. No wheezing, rhonchi or rales.      Comments: Ventilator driven breath sounds bilaterally  Abdominal:      Palpations: Abdomen is soft.      Tenderness: There is no guarding.      Comments: Anterior abdominal wall with wound vac in place. Ostomy   Genitourinary:     Comments: Packing in place  Musculoskeletal:         General: No swelling or deformity.      Cervical back: Neck supple.      Right lower leg: No edema.      Left lower leg: No edema.   Lymphadenopathy:      Cervical: No cervical adenopathy.   Skin:     General: Skin is warm and dry.   Neurological:      Cranial Nerves: No cranial nerve deficit.      Comments: Intubated and sedated          Vents:  Vent Mode: A/C (12/19/21 0725)  Set Rate: 20 BPM (12/19/21 0725)  Vt Set: 480 mL (12/19/21 0725)  PEEP/CPAP: 5 cmH20 (12/19/21 0725)  Oxygen Concentration (%): 40 (12/19/21 0725)  Peak Airway Pressure: 19 cmH2O (12/19/21 0725)  Plateau Pressure: 11 cmH20 (12/14/21 2330)  Total Ve: 7.3 mL (12/19/21 0725)  F/VT Ratio<105 (RSBI): (!) 44.44 (12/18/21 0800)    Lines/Drains/Airways     Central Venous Catheter Line                 Hemodialysis Catheter 12/15/21 0745 right internal jugular 4 days          Drain                 NG/OG Tube [place] sump 18 Fr. Left nostril -- days         Urethral Catheter 12/13/21 2130 16 Fr. 5 days         Colostomy 12/18/21 1030 Descending/sigmoid LUQ <1 day          Airway                 Airway - Non-Surgical 12/14/21 1047 4 days          Peripheral Intravenous Line                 Peripheral IV - Single Lumen 12/13/21 1812 20 G Right Wrist 5 days         Peripheral IV - Single Lumen 12/15/21 0327 20 G Anterior;Distal;Right Upper Arm 4 days         Peripheral IV - Single Lumen 12/18/21 0533 20 G Left;Posterior Forearm 1 day                Significant Labs:    CBC/Anemia Profile:  Recent Labs   Lab 12/18/21  0508 12/19/21  0555   WBC 15.38* 16.32*   HGB 8.4* 8.8*   HCT 24.4* 25.0*    199   MCV 84.1 84.2   RDW 14.6* 14.5        Chemistries:  Recent Labs   Lab 12/18/21  0508 12/19/21  0555    132*   K 4.3 5.2*   CL 96* 96*   CO2 22 21   BUN 56* 82*   CREATININE 6.35* 7.91*   CALCIUM 8.0* 7.3*       All pertinent labs within the past 24 hours have been reviewed.    Significant Imaging:  I have reviewed all pertinent imaging results/findings within the past 24 hours.

## 2021-12-19 NOTE — ASSESSMENT & PLAN NOTE
- remained intubated post-operatively; has returned to the OR multiple times  - able to decrease MV after HD

## 2021-12-19 NOTE — PROGRESS NOTES
[facility]  Pulmonology  Progress Note    Patient Name: Og Garcia  MRN: [id number]  Admission Date: 12/13/2021  Hospital Length of Stay: 6 days  Code Status: Full Code  Attending Provider: Harish Cazares MD  Primary Care Provider: Hector Arndt DNP, FNP-C   Principal Problem: Septic shock    Subjective:     Interval History: Remains on low dose vasopressors. Plan for OR tomorrow.     Objective:     Vital Signs (Most Recent):  Temp: 98 °F (36.7 °C) (12/19/21 0725)  Pulse: 77 (12/19/21 0700)  Resp: 20 (12/19/21 0700)  BP: 97/61 (12/19/21 0700)  SpO2: 100 % (12/19/21 0725) Vital Signs (24h Range):  Temp:  [97 °F (36.1 °C)-99.3 °F (37.4 °C)] 98 °F (36.7 °C)  Pulse:  [77-94] 77  Resp:  [10-22] 20  SpO2:  [98 %-100 %] 100 %  BP: ()/(43-67) 97/61     Weight: 99.1 kg (218 lb 7.6 oz)  Body mass index is 30.47 kg/m².      Intake/Output Summary (Last 24 hours) at 12/19/2021 1015  Last data filed at 12/19/2021 0601  Gross per 24 hour   Intake 1361.6 ml   Output 15 ml   Net 1346.6 ml       Physical Exam  Vitals reviewed.   Constitutional:       Appearance: He is ill-appearing.      Comments: Intubated and sedated   HENT:      Head:      Comments: Right temporal deformity     Nose: Nose normal.      Mouth/Throat:      Mouth: Mucous membranes are moist.   Eyes:      Conjunctiva/sclera: Conjunctivae normal.      Pupils: Pupils are equal, round, and reactive to light.   Cardiovascular:      Rate and Rhythm: Normal rate and regular rhythm.      Pulses: Normal pulses.      Heart sounds: Normal heart sounds.   Pulmonary:      Effort: No respiratory distress.      Breath sounds: No stridor. No wheezing, rhonchi or rales.      Comments: Ventilator driven breath sounds bilaterally  Abdominal:      Palpations: Abdomen is soft.      Tenderness: There is no guarding.      Comments: Anterior abdominal wall with wound vac in place. Ostomy   Genitourinary:     Comments: Packing in place  Musculoskeletal:          General: No swelling or deformity.      Cervical back: Neck supple.      Right lower leg: No edema.      Left lower leg: No edema.   Lymphadenopathy:      Cervical: No cervical adenopathy.   Skin:     General: Skin is warm and dry.   Neurological:      Cranial Nerves: No cranial nerve deficit.      Comments: Intubated and sedated         Vents:  Vent Mode: A/C (12/19/21 0725)  Set Rate: 20 BPM (12/19/21 0725)  Vt Set: 480 mL (12/19/21 0725)  PEEP/CPAP: 5 cmH20 (12/19/21 0725)  Oxygen Concentration (%): 40 (12/19/21 0725)  Peak Airway Pressure: 19 cmH2O (12/19/21 0725)  Plateau Pressure: 11 cmH20 (12/14/21 2330)  Total Ve: 7.3 mL (12/19/21 0725)  F/VT Ratio<105 (RSBI): (!) 44.44 (12/18/21 0800)    Lines/Drains/Airways     Central Venous Catheter Line                 Hemodialysis Catheter 12/15/21 0745 right internal jugular 4 days          Drain                 NG/OG Tube [place] sump 18 Fr. Left nostril -- days         Urethral Catheter 12/13/21 2130 16 Fr. 5 days         Colostomy 12/18/21 1030 Descending/sigmoid LUQ <1 day          Airway                 Airway - Non-Surgical 12/14/21 1047 4 days          Peripheral Intravenous Line                 Peripheral IV - Single Lumen 12/13/21 1812 20 G Right Wrist 5 days         Peripheral IV - Single Lumen 12/15/21 0327 20 G Anterior;Distal;Right Upper Arm 4 days         Peripheral IV - Single Lumen 12/18/21 0533 20 G Left;Posterior Forearm 1 day                Significant Labs:    CBC/Anemia Profile:  Recent Labs   Lab 12/18/21  0508 12/19/21  0555   WBC 15.38* 16.32*   HGB 8.4* 8.8*   HCT 24.4* 25.0*    199   MCV 84.1 84.2   RDW 14.6* 14.5        Chemistries:  Recent Labs   Lab 12/18/21  0508 12/19/21  0555    132*   K 4.3 5.2*   CL 96* 96*   CO2 22 21   BUN 56* 82*   CREATININE 6.35* 7.91*   CALCIUM 8.0* 7.3*       All pertinent labs within the past 24 hours have been reviewed.    Significant Imaging:  I have reviewed all pertinent imaging  results/findings within the past 24 hours.    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 The estimated ejection fraction is 40%. Left ventricular diastolic dysfunction.  - weaning pressors  - abx per ID          Antiplatelet or antithrombotic long-term use  - brillinta in home medications, unsure exactly why  - will try to confirm this, resume ASAP when safe from surgical standpoint  - plan for OR tomorrow    Hypocalcemia  - 4 gm IV today     RAHAT (acute kidney injury)  - nephrology consulted for rapid acute rise in Cr  - remains anuric  - Underwent HD 12/15 and 12/16, 12/17, likely HD today  - right IJ HD line       On mechanically assisted ventilation  - remained intubated post-operatively; has returned to the OR multiple times  - able to decrease MV after HD      Denice gangrene  - s/p multiple debridements  - wound vac in place  - ID consulted for antibiotic management: rocephin, daptomycin, clindamycin  - polymicrobial on cultures. E Coli +        Remains critically ill. Polymicrobial necrotizing fasciitis. ID following, discussed with Dr Hall. Low dose pressors, these have been decreased. Plan for OR tomorrow. Trickle enteral feeding today, NPO at MN. Critical care time: 35 minutes.            Raul Hall MD  Pulmonology  [facility] ICU

## 2021-12-19 NOTE — PROGRESS NOTES
Initial Pharmacy Consult    Consulted to assist in the management of Gent therapy in this 67 y/o Male  with skin soft tissue infection.     Labs: BUN    82            SCR:   7.9            CRCL: 11    Based on the patient's dosing weight of 85kg and the most recent lab data available, the following therapy will be initiated: Gent 170mg x 1 dose . Will check  trough prior to the 2nd dose 24hr after next dialysis is performed and redose when due.  Will Continue to monitor.    KAMARI Meeks.Ph.

## 2021-12-19 NOTE — ASSESSMENT & PLAN NOTE
- brillinta in home medications, unsure exactly why  - will try to confirm this, resume ASAP when safe from surgical standpoint  - plan for OR tomorrow

## 2021-12-19 NOTE — PLAN OF CARE
Problem: Noninvasive Ventilation Acute  Goal: Effective Unassisted Ventilation and Oxygenation  Outcome: Ongoing, Progressing     Problem: Device-Related Complication Risk (Hemodialysis)  Goal: Safe, Effective Therapy Delivery  Outcome: Ongoing, Progressing     Problem: Hemodynamic Instability (Hemodialysis)  Goal: Effective Tissue Perfusion  Outcome: Ongoing, Progressing     Problem: Infection (Hemodialysis)  Goal: Absence of Infection Signs and Symptoms  Outcome: Ongoing, Progressing     Problem: Adjustment to Illness (Sepsis/Septic Shock)  Goal: Optimal Coping  Outcome: Ongoing, Progressing     Problem: Noninvasive Ventilation Acute  Goal: Effective Unassisted Ventilation and Oxygenation  Outcome: Ongoing, Progressing     Problem: Device-Related Complication Risk (Hemodialysis)  Goal: Safe, Effective Therapy Delivery  Outcome: Ongoing, Progressing     Problem: Hemodynamic Instability (Hemodialysis)  Goal: Effective Tissue Perfusion  Outcome: Ongoing, Progressing     Problem: Infection (Hemodialysis)  Goal: Absence of Infection Signs and Symptoms  Outcome: Ongoing, Progressing     Problem: Adjustment to Illness (Sepsis/Septic Shock)  Goal: Optimal Coping  Outcome: Ongoing, Progressing     Problem: Adjustment to Illness (Sepsis/Septic Shock)  Goal: Optimal Coping  Outcome: Ongoing, Progressing

## 2021-12-19 NOTE — PLAN OF CARE
Problem: Infection  Goal: Absence of Infection Signs and Symptoms  Outcome: Ongoing, Progressing     Problem: Adult Inpatient Plan of Care  Goal: Plan of Care Review  Outcome: Ongoing, Progressing  Goal: Readiness for Transition of Care  Outcome: Ongoing, Progressing     Problem: Communication Impairment (Mechanical Ventilation, Invasive)  Goal: Effective Communication  Outcome: Ongoing, Progressing     Problem: Device-Related Complication Risk (Mechanical Ventilation, Invasive)  Goal: Optimal Device Function  Outcome: Ongoing, Progressing  Intervention: Optimize Device Care and Function  Flowsheets (Taken 12/18/2021 1936)  Aspiration Precautions:   tube feeding placement verified   upright posture maintained     Problem: Inability to Wean (Mechanical Ventilation, Invasive)  Goal: Mechanical Ventilation Liberation  Outcome: Ongoing, Progressing  Intervention: Promote Extubation and Mechanical Ventilation Liberation  Flowsheets (Taken 12/18/2021 1936)  Medication Review/Management: medications reviewed     Problem: Communication Impairment (Artificial Airway)  Goal: Effective Communication  Outcome: Ongoing, Progressing

## 2021-12-19 NOTE — ASSESSMENT & PLAN NOTE
- s/p multiple debridements  - wound vac in place  - ID consulted for antibiotic management: rocephin, daptomycin, clindamycin  - polymicrobial on cultures. E Coli +

## 2021-12-20 NOTE — PLAN OF CARE
Problem: Skin Injury Risk Increased  Goal: Skin Health and Integrity  Outcome: Ongoing, Progressing  Intervention: Promote and Optimize Oral Intake  Flowsheets (Taken 12/19/2021 1955)  Oral Nutrition Promotion: rest periods promoted     Problem: Skin and Tissue Injury (Mechanical Ventilation, Invasive)  Goal: Absence of Device-Related Skin and Tissue Injury  Outcome: Ongoing, Progressing     Problem: Ventilator-Induced Lung Injury (Mechanical Ventilation, Invasive)  Goal: Absence of Ventilator-Induced Lung Injury  Outcome: Ongoing, Progressing  Intervention: Prevent Ventilator-Associated Pneumonia  Flowsheets (Taken 12/19/2021 1955)  Head of Bed (HOB) Positioning: HOB at 45 degrees     Problem: Skin and Tissue Injury (Artificial Airway)  Goal: Absence of Device-Related Skin or Tissue Injury  Outcome: Ongoing, Progressing  Intervention: Maintain Skin and Tissue Health  Flowsheets (Taken 12/19/2021 1955)  Device Skin Pressure Protection: adhesive use limited

## 2021-12-20 NOTE — SUBJECTIVE & OBJECTIVE
Interval History:  Patient awake alert and can follow commands    Objective:     Vital Signs (Most Recent):  Temp: 98.1 °F (36.7 °C) (12/20/21 0445)  Pulse: 79 (12/20/21 0630)  Resp: 20 (12/20/21 0630)  BP: (!) 105/50 (12/20/21 0630)  SpO2: 98 % (12/20/21 0630) Vital Signs (24h Range):  Temp:  [98 °F (36.7 °C)-98.9 °F (37.2 °C)] 98.1 °F (36.7 °C)  Pulse:  [76-91] 79  Resp:  [16-23] 20  SpO2:  [96 %-100 %] 98 %  BP: ()/(46-69) 105/50     Weight: 98 kg (216 lb 0.8 oz)  Body mass index is 30.13 kg/m².      Intake/Output Summary (Last 24 hours) at 12/20/2021 0719  Last data filed at 12/20/2021 0656  Gross per 24 hour   Intake 1046.1 ml   Output 15 ml   Net 1031.1 ml       Physical Exam  Vitals reviewed.   Constitutional:       Appearance: Normal appearance.      Interventions: He is intubated and restrained.   HENT:      Head: Normocephalic and atraumatic.      Nose: Nose normal.      Mouth/Throat:      Mouth: Mucous membranes are dry.      Pharynx: Oropharynx is clear.   Eyes:      Extraocular Movements: Extraocular movements intact.      Conjunctiva/sclera: Conjunctivae normal.      Pupils: Pupils are equal, round, and reactive to light.   Cardiovascular:      Rate and Rhythm: Normal rate.      Heart sounds: Normal heart sounds. No murmur heard.      Pulmonary:      Effort: Pulmonary effort is normal. He is intubated.      Breath sounds: Normal breath sounds.   Abdominal:      General: Abdomen is flat. Bowel sounds are normal.      Palpations: Abdomen is soft.      Comments: Colostomy and wound incision with wound packed   Musculoskeletal:         General: Normal range of motion.      Cervical back: Normal range of motion and neck supple.      Right lower leg: No edema.      Left lower leg: No edema.   Skin:     General: Skin is warm and dry.      Capillary Refill: Capillary refill takes less than 2 seconds.   Neurological:      General: No focal deficit present.      Mental Status: He is alert and oriented to  person, place, and time.   Psychiatric:         Mood and Affect: Mood normal.         Behavior: Behavior normal.         Vents:  Vent Mode: A/C (12/20/21 0412)  Set Rate: 20 BPM (12/20/21 0412)  Vt Set: 480 mL (12/20/21 0412)  PEEP/CPAP: 5 cmH20 (12/20/21 0412)  Oxygen Concentration (%): 30 (12/20/21 0301)  Peak Airway Pressure: 20 cmH2O (12/20/21 0412)  Plateau Pressure: 11 cmH20 (12/14/21 2330)  Total Ve: 7.2 mL (12/20/21 0412)  F/VT Ratio<105 (RSBI): (!) 51.49 (12/19/21 1500)    Lines/Drains/Airways     Central Venous Catheter Line                 Hemodialysis Catheter 12/15/21 0745 right internal jugular 4 days          Drain                 NG/OG Tube [place] sump 18 Fr. Left nostril -- days         Urethral Catheter 12/13/21 2130 16 Fr. 6 days         Colostomy 12/18/21 1030 Descending/sigmoid LUQ 1 day          Airway                 Airway - Non-Surgical 12/14/21 1047 5 days          Peripheral Intravenous Line                 Peripheral IV - Single Lumen 12/18/21 0533 20 G Left;Posterior Forearm 2 days         Peripheral IV - Single Lumen 12/19/21 2126 20 G Anterior;Right Upper Arm <1 day                Significant Labs:    CBC/Anemia Profile:  Recent Labs   Lab 12/19/21  0555 12/20/21  0431   WBC 16.32* 17.01*   HGB 8.8* 9.1*   HCT 25.0* 25.3*    213   MCV 84.2 80.8   RDW 14.5 14.3        Chemistries:  Recent Labs   Lab 12/19/21  0555 12/20/21  0432   *  --    K 5.2*  --    CL 96*  --    CO2 21  --    BUN 82*  --    CREATININE 7.91*  --    CALCIUM 7.3*  --    MG  --  3.1*       All pertinent labs within the past 24 hours have been reviewed.    Significant Imaging:  I have reviewed all pertinent imaging results/findings within the past 24 hours.

## 2021-12-20 NOTE — PLAN OF CARE
Problem: Communication Impairment (Mechanical Ventilation, Invasive)  Goal: Effective Communication  Outcome: Ongoing, Progressing     Problem: Device-Related Complication Risk (Mechanical Ventilation, Invasive)  Goal: Optimal Device Function  Outcome: Ongoing, Progressing     Problem: Inability to Wean (Mechanical Ventilation, Invasive)  Goal: Mechanical Ventilation Liberation  Outcome: Ongoing, Progressing     Problem: Nutrition Impairment (Mechanical Ventilation, Invasive)  Goal: Optimal Nutrition Delivery  Outcome: Ongoing, Progressing     Problem: Skin and Tissue Injury (Mechanical Ventilation, Invasive)  Goal: Absence of Device-Related Skin and Tissue Injury  Outcome: Ongoing, Progressing

## 2021-12-20 NOTE — PLAN OF CARE
Problem: Communication Impairment (Mechanical Ventilation, Invasive)  Goal: Effective Communication  Outcome: Ongoing, Progressing     Problem: Device-Related Complication Risk (Mechanical Ventilation, Invasive)  Goal: Optimal Device Function  Outcome: Ongoing, Progressing     Problem: Inability to Wean (Mechanical Ventilation, Invasive)  Goal: Mechanical Ventilation Liberation  Outcome: Ongoing, Progressing     Problem: Nutrition Impairment (Mechanical Ventilation, Invasive)  Goal: Optimal Nutrition Delivery  Outcome: Ongoing, Progressing     Problem: Skin and Tissue Injury (Mechanical Ventilation, Invasive)  Goal: Absence of Device-Related Skin and Tissue Injury  Outcome: Ongoing, Progressing     Problem: Ventilator-Induced Lung Injury (Mechanical Ventilation, Invasive)  Goal: Absence of Ventilator-Induced Lung Injury  Outcome: Ongoing, Progressing     Problem: Communication Impairment (Artificial Airway)  Goal: Effective Communication  Outcome: Ongoing, Progressing     Problem: Communication Impairment (Mechanical Ventilation, Invasive)  Goal: Effective Communication  Outcome: Ongoing, Progressing     Problem: Device-Related Complication Risk (Mechanical Ventilation, Invasive)  Goal: Optimal Device Function  Outcome: Ongoing, Progressing     Problem: Inability to Wean (Mechanical Ventilation, Invasive)  Goal: Mechanical Ventilation Liberation  Outcome: Ongoing, Progressing     Problem: Nutrition Impairment (Mechanical Ventilation, Invasive)  Goal: Optimal Nutrition Delivery  Outcome: Ongoing, Progressing     Problem: Skin and Tissue Injury (Mechanical Ventilation, Invasive)  Goal: Absence of Device-Related Skin and Tissue Injury  Outcome: Ongoing, Progressing     Problem: Ventilator-Induced Lung Injury (Mechanical Ventilation, Invasive)  Goal: Absence of Ventilator-Induced Lung Injury  Outcome: Ongoing, Progressing     Problem: Communication Impairment (Artificial Airway)  Goal: Effective  Communication  Outcome: Ongoing, Progressing

## 2021-12-20 NOTE — PROGRESS NOTES
[facility] ICU  General Surgery  Progress Note    Subjective: sedated fentanyl on vent     History of Present Illness:  No notes on file    Post-Op Info:  Procedure(s) (LRB):  LAPAROTOMY, EXPLORATORY (N/A)  CREATION, COLOSTOMY (N/A)  IRRIGATION AND DEBRIDEMENT (Right)   2 Days Post-Op     No new subjective & objective note has been filed under this hospital service since the last note was generated.    Assessment/Plan:     Denice gangrene  12/20/2021  NWPT with good seal to abd, ostomy without output, wash out with diverting colostomy over weekend, stoma dark pink  getting dialysis today for hyperkalemia RAHAT, dr phillip plans to take back to or tomorrow for further washout remains on vent low dose pressors        Iliana Junior, KATHY  General Surgery  [facility] ICU

## 2021-12-20 NOTE — PLAN OF CARE
Ss spoke with delaney, np and pt is going for surgery tomorrow but np interested in pt going to [facility] middle of week. Ss informed veranda with [facility] and she is checking with pt's ins to see if he qualifies. Ss to obtain choice if ins approves. Following.

## 2021-12-20 NOTE — PLAN OF CARE
Problem: Infection  Goal: Absence of Infection Signs and Symptoms  Outcome: Ongoing, Progressing     Problem: Adult Inpatient Plan of Care  Goal: Plan of Care Review  Outcome: Ongoing, Progressing  Goal: Patient-Specific Goal (Individualized)  Outcome: Ongoing, Progressing  Goal: Absence of Hospital-Acquired Illness or Injury  Outcome: Ongoing, Progressing  Goal: Optimal Comfort and Wellbeing  Outcome: Ongoing, Progressing  Goal: Readiness for Transition of Care  Outcome: Ongoing, Progressing     Problem: Fall Injury Risk  Goal: Absence of Fall and Fall-Related Injury  Outcome: Ongoing, Progressing     Problem: Skin Injury Risk Increased  Goal: Skin Health and Integrity  Outcome: Ongoing, Progressing     Problem: Fluid and Electrolyte Imbalance (Acute Kidney Injury/Impairment)  Goal: Fluid and Electrolyte Balance  Outcome: Ongoing, Progressing     Problem: Oral Intake Inadequate (Acute Kidney Injury/Impairment)  Goal: Optimal Nutrition Intake  Outcome: Ongoing, Progressing     Problem: Renal Function Impairment (Acute Kidney Injury/Impairment)  Goal: Effective Renal Function  Outcome: Ongoing, Progressing     Problem: Communication Impairment (Mechanical Ventilation, Invasive)  Goal: Effective Communication  Outcome: Ongoing, Progressing     Problem: Device-Related Complication Risk (Mechanical Ventilation, Invasive)  Goal: Optimal Device Function  Outcome: Ongoing, Progressing     Problem: Inability to Wean (Mechanical Ventilation, Invasive)  Goal: Mechanical Ventilation Liberation  Outcome: Ongoing, Progressing     Problem: Nutrition Impairment (Mechanical Ventilation, Invasive)  Goal: Optimal Nutrition Delivery  Outcome: Ongoing, Progressing     Problem: Skin and Tissue Injury (Mechanical Ventilation, Invasive)  Goal: Absence of Device-Related Skin and Tissue Injury  Outcome: Ongoing, Progressing     Problem: Ventilator-Induced Lung Injury (Mechanical Ventilation, Invasive)  Goal: Absence of Ventilator-Induced  Lung Injury  Outcome: Ongoing, Progressing     Problem: Communication Impairment (Artificial Airway)  Goal: Effective Communication  Outcome: Ongoing, Progressing     Problem: Device-Related Complication Risk (Artificial Airway)  Goal: Optimal Device Function  Outcome: Ongoing, Progressing     Problem: Skin and Tissue Injury (Artificial Airway)  Goal: Absence of Device-Related Skin or Tissue Injury  Outcome: Ongoing, Progressing     Problem: Noninvasive Ventilation Acute  Goal: Effective Unassisted Ventilation and Oxygenation  Outcome: Ongoing, Progressing     Problem: Device-Related Complication Risk (Hemodialysis)  Goal: Safe, Effective Therapy Delivery  Outcome: Ongoing, Progressing     Problem: Hemodynamic Instability (Hemodialysis)  Goal: Effective Tissue Perfusion  Outcome: Ongoing, Progressing     Problem: Infection (Hemodialysis)  Goal: Absence of Infection Signs and Symptoms  Outcome: Ongoing, Progressing     Problem: Adjustment to Illness (Sepsis/Septic Shock)  Goal: Optimal Coping  Outcome: Ongoing, Progressing     Problem: Bleeding (Sepsis/Septic Shock)  Goal: Absence of Bleeding  Outcome: Ongoing, Progressing     Problem: Glycemic Control Impaired (Sepsis/Septic Shock)  Goal: Blood Glucose Level Within Desired Range  Outcome: Ongoing, Progressing     Problem: Infection Progression (Sepsis/Septic Shock)  Goal: Absence of Infection Signs and Symptoms  Outcome: Ongoing, Progressing     Problem: Nutrition Impaired (Sepsis/Septic Shock)  Goal: Optimal Nutrition Intake  Outcome: Ongoing, Progressing

## 2021-12-20 NOTE — ASSESSMENT & PLAN NOTE
12/20/2021  NWPT with good seal to abd, ostomy without output, wash out with diverting colostomy over weekend, stoma dark pink  getting dialysis today for hyperkalemia RAHAT, dr phillip plans to take back to or tomorrow for further washout remains on vent low dose pressors

## 2021-12-20 NOTE — ASSESSMENT & PLAN NOTE
- s/p multiple debridements  - wound vac in place  - ID consulted for antibiotic management: rocephin, daptomycin, clindamycin  - polymicrobial on cultures. E Coli +  Above reviewed and I agree

## 2021-12-20 NOTE — SUBJECTIVE & OBJECTIVE
Interval History: The patient remains ventilated.  He underwent wound dressing change today.  He tolerated dialysis earlier today.      Review of patient's allergies indicates:  No Known Allergies  Current Facility-Administered Medications   Medication Frequency    0.9%  NaCl infusion (for blood administration) Q24H PRN    0.9%  NaCl infusion PRN    acetaminophen tablet 500 mg Q6H PRN    ampicillin (OMNIPEN) 2 g in sodium chloride 0.9 % 100 mL IVPB (MB+) Q8H    clindamycin in D5W 900 mg/50 mL IVPB 900 mg Q8H    dextrose 50% injection 12.5 g PRN    fentaNYL 2500 mcg in D5W 250 mL infusion premix (titrating) (conc: 10 mcg/mL) Continuous    gentamicin - pharmacy to dose pharmacy to manage frequency    glucagon (human recombinant) injection 1 mg PRN    heparin (porcine) injection 4,000 Units PRN    heparin (porcine) injection 5,000 Units Q8H    insulin aspart U-100 injection 1-10 Units Q6H PRN    NORepinephrine 32 mg in dextrose 5 % 250 mL infusion Continuous    pantoprazole injection 40 mg Daily    propofol (DIPRIVAN) 10 mg/mL infusion Continuous    sodium chloride 0.9% bolus 250 mL PRN       Objective:     Vital Signs (Most Recent):  Temp: 97.4 °F (36.3 °C) (12/20/21 1101)  Pulse: 87 (12/20/21 1330)  Resp: (!) 21 (12/20/21 1330)  BP: (!) 150/60 (12/20/21 1330)  SpO2: (!) 91 % (12/20/21 1330)  O2 Device (Oxygen Therapy): ventilator (12/20/21 1148) Vital Signs (24h Range):  Temp:  [97.2 °F (36.2 °C)-98.9 °F (37.2 °C)] 97.4 °F (36.3 °C)  Pulse:  [75-96] 87  Resp:  [7-23] 21  SpO2:  [86 %-100 %] 91 %  BP: ()/(46-70) 150/60     Weight: 98 kg (216 lb 0.8 oz) (12/20/21 0230)  Body mass index is 30.13 kg/m².  Body surface area is 2.22 meters squared.    I/O last 3 completed shifts:  In: 1310.2 [I.V.:510.2; NG/GT:150; IV Piggyback:650]  Out: 30 [Urine:30]    Physical Exam  Vitals reviewed.   Constitutional:       Appearance: He is ill-appearing.   Cardiovascular:      Rate and Rhythm: Regular rhythm.    Pulmonary:      Effort: Pulmonary effort is normal.      Comments: ventilated  Abdominal:      Palpations: Abdomen is soft.         Significant Labs:  BMP:   Recent Labs   Lab 12/20/21  0432   *   *   K 6.4*   CL 93*   CO2 16*   *   CREATININE 9.55*   CALCIUM 6.9*   MG 3.1*     CBC:   Recent Labs   Lab 12/20/21  0431   WBC 17.01*   RBC 3.13*   HGB 9.1*   HCT 25.3*      MCV 80.8   MCH 29.1   MCHC 36.0        Significant Imaging:  Labs: Reviewed

## 2021-12-20 NOTE — PROGRESS NOTES
[facility]  Pulmonology  Progress Note    Patient Name: Og Garcia  MRN: [id number]  Admission Date: 12/13/2021  Hospital Length of Stay: 7 days  Code Status: Full Code  Attending Provider: Harish Cazares MD  Primary Care Provider: Hector Arndt DNP, FNP-C   Principal Problem: Septic shock    Subjective:     Interval History:  Patient awake alert and can follow commands    Objective:     Vital Signs (Most Recent):  Temp: 98.1 °F (36.7 °C) (12/20/21 0445)  Pulse: 79 (12/20/21 0630)  Resp: 20 (12/20/21 0630)  BP: (!) 105/50 (12/20/21 0630)  SpO2: 98 % (12/20/21 0630) Vital Signs (24h Range):  Temp:  [98 °F (36.7 °C)-98.9 °F (37.2 °C)] 98.1 °F (36.7 °C)  Pulse:  [76-91] 79  Resp:  [16-23] 20  SpO2:  [96 %-100 %] 98 %  BP: ()/(46-69) 105/50     Weight: 98 kg (216 lb 0.8 oz)  Body mass index is 30.13 kg/m².      Intake/Output Summary (Last 24 hours) at 12/20/2021 0719  Last data filed at 12/20/2021 0656  Gross per 24 hour   Intake 1046.1 ml   Output 15 ml   Net 1031.1 ml       Physical Exam  Vitals reviewed.   Constitutional:       Appearance: Normal appearance.      Interventions: He is intubated and restrained.   HENT:      Head: Normocephalic and atraumatic.      Nose: Nose normal.      Mouth/Throat:      Mouth: Mucous membranes are dry.      Pharynx: Oropharynx is clear.   Eyes:      Extraocular Movements: Extraocular movements intact.      Conjunctiva/sclera: Conjunctivae normal.      Pupils: Pupils are equal, round, and reactive to light.   Cardiovascular:      Rate and Rhythm: Normal rate.      Heart sounds: Normal heart sounds. No murmur heard.      Pulmonary:      Effort: Pulmonary effort is normal. He is intubated.      Breath sounds: Normal breath sounds.   Abdominal:      General: Abdomen is flat. Bowel sounds are normal.      Palpations: Abdomen is soft.      Comments: Colostomy and wound incision with wound packed   Musculoskeletal:         General: Normal range of motion.       Cervical back: Normal range of motion and neck supple.      Right lower leg: No edema.      Left lower leg: No edema.   Skin:     General: Skin is warm and dry.      Capillary Refill: Capillary refill takes less than 2 seconds.   Neurological:      General: No focal deficit present.      Mental Status: He is alert and oriented to person, place, and time.   Psychiatric:         Mood and Affect: Mood normal.         Behavior: Behavior normal.         Vents:  Vent Mode: A/C (12/20/21 0412)  Set Rate: 20 BPM (12/20/21 0412)  Vt Set: 480 mL (12/20/21 0412)  PEEP/CPAP: 5 cmH20 (12/20/21 0412)  Oxygen Concentration (%): 30 (12/20/21 0301)  Peak Airway Pressure: 20 cmH2O (12/20/21 0412)  Plateau Pressure: 11 cmH20 (12/14/21 2330)  Total Ve: 7.2 mL (12/20/21 0412)  F/VT Ratio<105 (RSBI): (!) 51.49 (12/19/21 1500)    Lines/Drains/Airways     Central Venous Catheter Line                 Hemodialysis Catheter 12/15/21 0745 right internal jugular 4 days          Drain                 NG/OG Tube [place] sump 18 Fr. Left nostril -- days         Urethral Catheter 12/13/21 2130 16 Fr. 6 days         Colostomy 12/18/21 1030 Descending/sigmoid LUQ 1 day          Airway                 Airway - Non-Surgical 12/14/21 1047 5 days          Peripheral Intravenous Line                 Peripheral IV - Single Lumen 12/18/21 0533 20 G Left;Posterior Forearm 2 days         Peripheral IV - Single Lumen 12/19/21 2126 20 G Anterior;Right Upper Arm <1 day                Significant Labs:    CBC/Anemia Profile:  Recent Labs   Lab 12/19/21  0555 12/20/21  0431   WBC 16.32* 17.01*   HGB 8.8* 9.1*   HCT 25.0* 25.3*    213   MCV 84.2 80.8   RDW 14.5 14.3        Chemistries:  Recent Labs   Lab 12/19/21  0555 12/20/21  0432   *  --    K 5.2*  --    CL 96*  --    CO2 21  --    BUN 82*  --    CREATININE 7.91*  --    CALCIUM 7.3*  --    MG  --  3.1*       All pertinent labs within the past 24 hours have been reviewed.    Significant  Imaging:  I have reviewed all pertinent imaging results/findings within the past 24 hours.    Assessment/Plan:     Denice gangrene  - s/p multiple debridements  - wound vac in place  - ID consulted for antibiotic management: rocephin, daptomycin, clindamycin  - polymicrobial on cultures. E Coli +  Above reviewed and I agree    On mechanically assisted ventilation  Will begin some weaning trials today when the patient gets back from the OR      RAHAT (acute kidney injury)  Continue hemodialysis       Hypertension  - now on pressors, holding metoprolol 25 mg BID                 Jose Urban MD  Pulmonology  [facility] [place] - [facility] ICU

## 2021-12-20 NOTE — PROGRESS NOTES
[facility]  Nephrology  Progress Note    Patient Name: Og Garcia  MRN: [id number]  Admission Date: 12/13/2021  Hospital Length of Stay: 7 days  Attending Provider: Harish Cazares MD   Primary Care Physician: Hector Arndt DNP, FNP-C  Principal Problem:Septic shock    Subjective:     HPI: History per the chart as the patient is intubated.  This patient presented with abdominal pain.  He subsequently had a CT of the abdomen which showed subcutaneous air.  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  Urine output has been minimal.  Serum creatinine has trended up to 7.3.  Serum potassium is 4.3.  Nephrology is consulted for renal issues.  The CT abdomen did not show renal abnormality.      Interval History: The patient remains ventilated.  He underwent wound dressing change today.  He tolerated dialysis earlier today.      Review of patient's allergies indicates:  No Known Allergies  Current Facility-Administered Medications   Medication Frequency    0.9%  NaCl infusion (for blood administration) Q24H PRN    0.9%  NaCl infusion PRN    acetaminophen tablet 500 mg Q6H PRN    ampicillin (OMNIPEN) 2 g in sodium chloride 0.9 % 100 mL IVPB (MB+) Q8H    clindamycin in D5W 900 mg/50 mL IVPB 900 mg Q8H    dextrose 50% injection 12.5 g PRN    fentaNYL 2500 mcg in D5W 250 mL infusion premix (titrating) (conc: 10 mcg/mL) Continuous    gentamicin - pharmacy to dose pharmacy to manage frequency    glucagon (human recombinant) injection 1 mg PRN    heparin (porcine) injection 4,000 Units PRN    heparin (porcine) injection 5,000 Units Q8H    insulin aspart U-100 injection 1-10 Units Q6H PRN    NORepinephrine 32 mg in dextrose 5 % 250 mL infusion Continuous    pantoprazole injection 40 mg Daily    propofol (DIPRIVAN) 10 mg/mL infusion Continuous    sodium chloride 0.9% bolus 250 mL PRN       Objective:     Vital Signs (Most Recent):  Temp: 97.4 °F (36.3 °C) (12/20/21 1101)  Pulse:  87 (12/20/21 1330)  Resp: (!) 21 (12/20/21 1330)  BP: (!) 150/60 (12/20/21 1330)  SpO2: (!) 91 % (12/20/21 1330)  O2 Device (Oxygen Therapy): ventilator (12/20/21 1148) Vital Signs (24h Range):  Temp:  [97.2 °F (36.2 °C)-98.9 °F (37.2 °C)] 97.4 °F (36.3 °C)  Pulse:  [75-96] 87  Resp:  [7-23] 21  SpO2:  [86 %-100 %] 91 %  BP: ()/(46-70) 150/60     Weight: 98 kg (216 lb 0.8 oz) (12/20/21 0230)  Body mass index is 30.13 kg/m².  Body surface area is 2.22 meters squared.    I/O last 3 completed shifts:  In: 1310.2 [I.V.:510.2; NG/GT:150; IV Piggyback:650]  Out: 30 [Urine:30]    Physical Exam  Vitals reviewed.   Constitutional:       Appearance: He is ill-appearing.   Cardiovascular:      Rate and Rhythm: Regular rhythm.   Pulmonary:      Effort: Pulmonary effort is normal.      Comments: ventilated  Abdominal:      Palpations: Abdomen is soft.         Significant Labs:  BMP:   Recent Labs   Lab 12/20/21  0432   *   *   K 6.4*   CL 93*   CO2 16*   *   CREATININE 9.55*   CALCIUM 6.9*   MG 3.1*     CBC:   Recent Labs   Lab 12/20/21  0431   WBC 17.01*   RBC 3.13*   HGB 9.1*   HCT 25.3*      MCV 80.8   MCH 29.1   MCHC 36.0        Significant Imaging:  Labs: Reviewed    Assessment/Plan:     RAAHT (acute kidney injury)  Septic ATN  No renal recovery.   Low threshhold for dialysis support.  Ask for dialysis catheter  Hepatitis panel  12/16/2021 Continue with dialysis support  12/20/2021  No signs of renal recovery.  Continue with scheduled hemodialysis for this patient.    Denice gangrene  S/p wound debridement  S/p wound washout today.  12/20/2021 Continue with dressing changes        Thank you for your consult. I will follow-up with patient. Please contact us if you have any additional questions.    Edwin Pryor Jr, MD  Nephrology  [facility]

## 2021-12-20 NOTE — PROCEDURES
"Og Garcia is a 66 y.o. male patient.    Temp: 97.4 °F (36.3 °C) (12/20/21 1101)  Pulse: 93 (12/20/21 1315)  Resp: 20 (12/20/21 1315)  BP: (!) 145/66 (12/20/21 1315)  SpO2: (!) 86 % (12/20/21 1315)  Weight: 98 kg (216 lb 0.8 oz) (12/20/21 0230)  Height: 5' 11" (180.3 cm) (12/14/21 1620)     Preoperative diagnosis:  Intra-abdominal necrotizing soft tissue infection  Postoperative diagnosis: as above  Procedure Performed:  Replacement of wound VAC with washout of abdominal fluid collection  Surgeon: Dr. Harish Cazares  Anesthesia:  IV sedation with fentanyl  Blood loss: Minimal  Complications: None  Specimen:     Procedure in detail:  After informed consent patient's abdominal area was prepped with the old dressing removed and Kerlix as pulled out from the abdominal cavity.  We inspected the abdominal cavity and noted no obvious areas of purulence.  Some mild serous drainage along the pericolic gutters were cleaned out no other areas of obvious infection were appreciated.  We then placed a white sponge in the deep layer of the fascial closure and placed our black sponge on top overlying most of the wound.  Once we did this we placed the VAC back on there and had good suction.  We then restarted the wound VAC and patient tolerated the procedure well.     Procedures    12/20/2021  "

## 2021-12-20 NOTE — ASSESSMENT & PLAN NOTE
Septic ATN  No renal recovery.   Low threshhold for dialysis support.  Ask for dialysis catheter  Hepatitis panel  12/16/2021 Continue with dialysis support  12/20/2021  No signs of renal recovery.  Continue with scheduled hemodialysis for this patient.

## 2021-12-20 NOTE — RESPIRATORY THERAPY
Vent Type: VC   Vent Mode: A/C   Rate 20   Tidal Volume 480   PEEP 5   FIO2 30   I-Time: 0     0815- place pt on CPAP 12/5 30%  0915- place pt back on previous settings; pt tolerated trial well.    1148- place pt on CPAP 12/5 30%.  1248- place pt back on previous settings; pt tolerated trial well.

## 2021-12-21 NOTE — PLAN OF CARE
Problem: Communication Impairment (Mechanical Ventilation, Invasive)  Goal: Effective Communication  Outcome: Ongoing, Progressing     Problem: Device-Related Complication Risk (Mechanical Ventilation, Invasive)  Goal: Optimal Device Function  Outcome: Ongoing, Progressing     Problem: Inability to Wean (Mechanical Ventilation, Invasive)  Goal: Mechanical Ventilation Liberation  Outcome: Ongoing, Progressing     Problem: Nutrition Impairment (Mechanical Ventilation, Invasive)  Goal: Optimal Nutrition Delivery  Outcome: Ongoing, Progressing     Problem: Ventilator-Induced Lung Injury (Mechanical Ventilation, Invasive)  Goal: Absence of Ventilator-Induced Lung Injury  Outcome: Ongoing, Progressing

## 2021-12-21 NOTE — RESPIRATORY THERAPY
Vent Type: VC   Vent Mode: A/C   Rate 20   Tidal Volume 480   PEEP 5   FIO2 40   I-Time: 0     0738- place pt on CPAP 8/5 30%.  0955- place pt back on previous settings; pt tolerated trial well.    1220- place pt on CPAP 12/5 30%.  1435- place pt back on previous settings; pt tolerated trial well.

## 2021-12-21 NOTE — PLAN OF CARE
Problem: Infection  Goal: Absence of Infection Signs and Symptoms  12/21/2021 1038 by Sheyla Jesus RN  Outcome: Ongoing, Progressing  12/21/2021 0904 by Sheyla Jesus RN  Outcome: Ongoing, Progressing     Problem: Adult Inpatient Plan of Care  Goal: Plan of Care Review  12/21/2021 1038 by Sehyla Jesus RN  Outcome: Ongoing, Progressing  12/21/2021 0904 by Sheyla Jesus RN  Outcome: Ongoing, Progressing  Goal: Patient-Specific Goal (Individualized)  12/21/2021 1038 by Sheyla Jesus RN  Outcome: Ongoing, Progressing  12/21/2021 0904 by Sheyla Jesus RN  Outcome: Ongoing, Progressing  Goal: Absence of Hospital-Acquired Illness or Injury  12/21/2021 1038 by Sheyla Jesus RN  Outcome: Ongoing, Progressing  12/21/2021 0904 by Sheyla Jesus RN  Outcome: Ongoing, Progressing  Goal: Optimal Comfort and Wellbeing  12/21/2021 1038 by Sheyla Jesus RN  Outcome: Ongoing, Progressing  12/21/2021 0904 by Sheyla Jesus RN  Outcome: Ongoing, Progressing  Goal: Readiness for Transition of Care  12/21/2021 1038 by Sheyla Jesus RN  Outcome: Ongoing, Progressing  12/21/2021 0904 by Sheyla Jesus RN  Outcome: Ongoing, Progressing     Problem: Fall Injury Risk  Goal: Absence of Fall and Fall-Related Injury  12/21/2021 1038 by Sheyla Jesus RN  Outcome: Ongoing, Progressing  12/21/2021 0904 by Sheyla Jesus RN  Outcome: Ongoing, Progressing     Problem: Skin Injury Risk Increased  Goal: Skin Health and Integrity  12/21/2021 1038 by Sheyla Jesus RN  Outcome: Ongoing, Progressing  12/21/2021 0904 by Sheyla Jesus RN  Outcome: Ongoing, Progressing     Problem: Fluid and Electrolyte Imbalance (Acute Kidney Injury/Impairment)  Goal: Fluid and Electrolyte Balance  12/21/2021 1038 by Sheyla Jesus RN  Outcome: Ongoing, Progressing  12/21/2021 0904 by Sheyla Jesus RN  Outcome: Ongoing, Progressing     Problem: Oral Intake Inadequate (Acute Kidney  Injury/Impairment)  Goal: Optimal Nutrition Intake  12/21/2021 1038 by Sheyla Jesus RN  Outcome: Ongoing, Progressing  12/21/2021 0904 by Sheyla Jesus RN  Outcome: Ongoing, Progressing     Problem: Renal Function Impairment (Acute Kidney Injury/Impairment)  Goal: Effective Renal Function  12/21/2021 1038 by Sheyla Jesus RN  Outcome: Ongoing, Progressing  12/21/2021 0904 by Sheyla Jesus RN  Outcome: Ongoing, Progressing     Problem: Communication Impairment (Mechanical Ventilation, Invasive)  Goal: Effective Communication  12/21/2021 1038 by Sheyla Jesus RN  Outcome: Ongoing, Progressing  12/21/2021 0904 by Sheyla Jesus RN  Outcome: Ongoing, Progressing     Problem: Device-Related Complication Risk (Mechanical Ventilation, Invasive)  Goal: Optimal Device Function  12/21/2021 1038 by Sheyla Jesus RN  Outcome: Ongoing, Progressing  12/21/2021 0904 by Sheyla Jesus RN  Outcome: Ongoing, Progressing     Problem: Inability to Wean (Mechanical Ventilation, Invasive)  Goal: Mechanical Ventilation Liberation  12/21/2021 1038 by Sheyla Jesus RN  Outcome: Ongoing, Progressing  12/21/2021 0904 by Sheyla Jesus RN  Outcome: Ongoing, Progressing     Problem: Nutrition Impairment (Mechanical Ventilation, Invasive)  Goal: Optimal Nutrition Delivery  12/21/2021 1038 by Sheyla Jesus RN  Outcome: Ongoing, Progressing  12/21/2021 0904 by Sheyla Jesus RN  Outcome: Ongoing, Progressing     Problem: Skin and Tissue Injury (Mechanical Ventilation, Invasive)  Goal: Absence of Device-Related Skin and Tissue Injury  12/21/2021 1038 by Sheyla Jesus RN  Outcome: Ongoing, Progressing  12/21/2021 0904 by Sheyla Jesus RN  Outcome: Ongoing, Progressing     Problem: Ventilator-Induced Lung Injury (Mechanical Ventilation, Invasive)  Goal: Absence of Ventilator-Induced Lung Injury  12/21/2021 1038 by Sheyla Jesus RN  Outcome: Ongoing, Progressing  12/21/2021 0904 by Sheyla JAIMES  STEVEN Jesus  Outcome: Ongoing, Progressing     Problem: Communication Impairment (Artificial Airway)  Goal: Effective Communication  12/21/2021 1038 by Sheyla Jesus RN  Outcome: Ongoing, Progressing  12/21/2021 0904 by Sheyla Jesus RN  Outcome: Ongoing, Progressing     Problem: Device-Related Complication Risk (Artificial Airway)  Goal: Optimal Device Function  12/21/2021 1038 by Sheyla Jesus RN  Outcome: Ongoing, Progressing  12/21/2021 0904 by Sheyla Jesus RN  Outcome: Ongoing, Progressing     Problem: Skin and Tissue Injury (Artificial Airway)  Goal: Absence of Device-Related Skin or Tissue Injury  12/21/2021 1038 by Sheyla Jesus RN  Outcome: Ongoing, Progressing  12/21/2021 0904 by Sheyla Jesus RN  Outcome: Ongoing, Progressing     Problem: Noninvasive Ventilation Acute  Goal: Effective Unassisted Ventilation and Oxygenation  12/21/2021 1038 by Sheyla Jesus RN  Outcome: Ongoing, Progressing  12/21/2021 0904 by Sheyla Jesus RN  Outcome: Ongoing, Progressing     Problem: Device-Related Complication Risk (Hemodialysis)  Goal: Safe, Effective Therapy Delivery  12/21/2021 1038 by Sheyla Jesus RN  Outcome: Ongoing, Progressing  12/21/2021 0904 by Sheyla Jesus RN  Outcome: Ongoing, Progressing     Problem: Hemodynamic Instability (Hemodialysis)  Goal: Effective Tissue Perfusion  12/21/2021 1038 by Sheyla Jesus RN  Outcome: Ongoing, Progressing  12/21/2021 0904 by Sheyla Jesus RN  Outcome: Ongoing, Progressing     Problem: Infection (Hemodialysis)  Goal: Absence of Infection Signs and Symptoms  12/21/2021 1038 by Sheyla Jesus RN  Outcome: Ongoing, Progressing  12/21/2021 0904 by Sheyla Jesus RN  Outcome: Ongoing, Progressing     Problem: Adjustment to Illness (Sepsis/Septic Shock)  Goal: Optimal Coping  12/21/2021 1038 by Sheyla Jesus RN  Outcome: Ongoing, Progressing  12/21/2021 0904 by Sheyla Jesus RN  Outcome: Ongoing, Progressing      Problem: Bleeding (Sepsis/Septic Shock)  Goal: Absence of Bleeding  12/21/2021 1038 by Sheyla Jesus RN  Outcome: Ongoing, Progressing  12/21/2021 0904 by Sheyla Jesus RN  Outcome: Ongoing, Progressing     Problem: Glycemic Control Impaired (Sepsis/Septic Shock)  Goal: Blood Glucose Level Within Desired Range  12/21/2021 1038 by Sheyla Jesus RN  Outcome: Ongoing, Progressing  12/21/2021 0904 by Sheyla Jesus RN  Outcome: Ongoing, Progressing     Problem: Infection Progression (Sepsis/Septic Shock)  Goal: Absence of Infection Signs and Symptoms  12/21/2021 1038 by Sheyla Jesus RN  Outcome: Ongoing, Progressing  12/21/2021 0904 by Sheyla Jesus RN  Outcome: Ongoing, Progressing     Problem: Nutrition Impaired (Sepsis/Septic Shock)  Goal: Optimal Nutrition Intake  12/21/2021 1038 by Sheyla Jesus RN  Outcome: Ongoing, Progressing  12/21/2021 0904 by Sheyla Jesus RN  Outcome: Ongoing, Progressing

## 2021-12-21 NOTE — PLAN OF CARE
Problem: Skin Injury Risk Increased  Goal: Skin Health and Integrity  Outcome: Ongoing, Progressing  Intervention: Promote and Optimize Oral Intake  Flowsheets (Taken 12/20/2021 1928)  Oral Nutrition Promotion: safe use of adaptive equipment encouraged     Problem: Device-Related Complication Risk (Mechanical Ventilation, Invasive)  Goal: Optimal Device Function  Outcome: Ongoing, Progressing     Problem: Inability to Wean (Mechanical Ventilation, Invasive)  Goal: Mechanical Ventilation Liberation  Outcome: Ongoing, Progressing  Intervention: Promote Extubation and Mechanical Ventilation Liberation  Flowsheets (Taken 12/20/2021 1928)  Medication Review/Management: medications reviewed     Problem: Skin and Tissue Injury (Mechanical Ventilation, Invasive)  Goal: Absence of Device-Related Skin and Tissue Injury  Outcome: Ongoing, Progressing     Problem: Ventilator-Induced Lung Injury (Mechanical Ventilation, Invasive)  Goal: Absence of Ventilator-Induced Lung Injury  Outcome: Ongoing, Progressing  Intervention: Prevent Ventilator-Associated Pneumonia  Flowsheets (Taken 12/20/2021 1928)  Head of Bed (HOB) Positioning: HOB at 45 degrees     Problem: Communication Impairment (Artificial Airway)  Goal: Effective Communication  Outcome: Ongoing, Progressing  Intervention: Ensure Effective Communication  Flowsheets (Taken 12/20/2021 1928)  Trust Relationship/Rapport:   care explained   choices provided

## 2021-12-21 NOTE — SUBJECTIVE & OBJECTIVE
Interval History:  Patient awake alert    Objective:     Vital Signs (Most Recent):  Temp: 98.7 °F (37.1 °C) (12/21/21 0342)  Pulse: 92 (12/21/21 0600)  Resp: 20 (12/21/21 0600)  BP: 123/63 (12/21/21 0600)  SpO2: 99 % (12/21/21 0600) Vital Signs (24h Range):  Temp:  [97.2 °F (36.2 °C)-99.3 °F (37.4 °C)] 98.7 °F (37.1 °C)  Pulse:  [] 92  Resp:  [7-22] 20  SpO2:  [86 %-100 %] 99 %  BP: ()/(46-85) 123/63     Weight: 97.6 kg (215 lb 2.7 oz)  Body mass index is 30.01 kg/m².      Intake/Output Summary (Last 24 hours) at 12/21/2021 0616  Last data filed at 12/21/2021 0601  Gross per 24 hour   Intake 1178.59 ml   Output 2500 ml   Net -1321.41 ml       Physical Exam  Vitals reviewed.   Constitutional:       Appearance: Normal appearance.      Interventions: He is intubated.   HENT:      Head: Normocephalic and atraumatic.      Nose: Nose normal.      Mouth/Throat:      Mouth: Mucous membranes are dry.      Pharynx: Oropharynx is clear.   Eyes:      Extraocular Movements: Extraocular movements intact.      Conjunctiva/sclera: Conjunctivae normal.      Pupils: Pupils are equal, round, and reactive to light.   Cardiovascular:      Rate and Rhythm: Normal rate.      Heart sounds: Normal heart sounds. No murmur heard.      Pulmonary:      Effort: Pulmonary effort is normal. He is intubated.      Breath sounds: Normal breath sounds.   Abdominal:      General: Abdomen is flat. Bowel sounds are normal.      Palpations: Abdomen is soft.   Musculoskeletal:         General: Normal range of motion.      Cervical back: Normal range of motion and neck supple.      Right lower leg: No edema.      Left lower leg: No edema.   Skin:     General: Skin is warm and dry.      Capillary Refill: Capillary refill takes less than 2 seconds.   Neurological:      General: No focal deficit present.      Mental Status: He is alert and oriented to person, place, and time.   Psychiatric:         Mood and Affect: Mood normal.         Behavior:  Behavior normal.         Vents:  Vent Mode: A/C (12/21/21 0346)  Set Rate: 20 BPM (12/21/21 0346)  Vt Set: 480 mL (12/21/21 0346)  Pressure Support: 12 cmH20 (12/20/21 2330)  PEEP/CPAP: 5 cmH20 (12/21/21 0346)  Oxygen Concentration (%): 30 (12/21/21 0346)  Peak Airway Pressure: 22 cmH2O (12/21/21 0346)  Plateau Pressure: 11 cmH20 (12/14/21 2330)  Total Ve: 6.6 mL (12/21/21 0346)  F/VT Ratio<105 (RSBI): (!) 34.97 (12/20/21 2330)    Lines/Drains/Airways     Central Venous Catheter Line                 Hemodialysis Catheter 12/15/21 0745 right internal jugular 5 days          Drain                 NG/OG Tube [place] sump 18 Fr. Left nostril -- days         Urethral Catheter 12/13/21 2130 16 Fr. 7 days         Colostomy 12/18/21 1030 Descending/sigmoid LUQ 2 days          Airway                 Airway - Non-Surgical 12/14/21 1047 6 days          Peripheral Intravenous Line                 Peripheral IV - Single Lumen 12/18/21 0533 20 G Left;Posterior Forearm 3 days         Peripheral IV - Single Lumen 12/19/21 2126 20 G Anterior;Right Upper Arm 1 day                Significant Labs:    CBC/Anemia Profile:  Recent Labs   Lab 12/20/21  0431   WBC 17.01*   HGB 9.1*   HCT 25.3*      MCV 80.8   RDW 14.3        Chemistries:  Recent Labs   Lab 12/20/21  0432   *   K 6.4*   CL 93*   CO2 16*   *   CREATININE 9.55*   CALCIUM 6.9*   MG 3.1*   PHOS 15.3*       All pertinent labs within the past 24 hours have been reviewed.    Significant Imaging:  I have reviewed all pertinent imaging results/findings within the past 24 hours.

## 2021-12-21 NOTE — SUBJECTIVE & OBJECTIVE
Interval History: The patient's condition is about the same.  He tolerated dialysis on yesterday.    Review of patient's allergies indicates:  No Known Allergies  Current Facility-Administered Medications   Medication Frequency    0.9%  NaCl infusion (for blood administration) Q24H PRN    0.9%  NaCl infusion PRN    acetaminophen tablet 500 mg Q6H PRN    ampicillin (OMNIPEN) 2 g in sodium chloride 0.9 % 100 mL IVPB (MB+) Q8H    clindamycin in D5W 900 mg/50 mL IVPB 900 mg Q8H    dextrose 50% injection 12.5 g PRN    fentaNYL 2500 mcg in D5W 250 mL infusion premix (titrating) (conc: 10 mcg/mL) Continuous    gentamicin - pharmacy to dose pharmacy to manage frequency    glucagon (human recombinant) injection 1 mg PRN    heparin (porcine) injection 4,000 Units PRN    heparin (porcine) injection 5,000 Units Q8H    insulin aspart U-100 injection 1-10 Units Q6H PRN    NORepinephrine 32 mg in dextrose 5 % 250 mL infusion Continuous    pantoprazole injection 40 mg Daily    propofol (DIPRIVAN) 10 mg/mL infusion Continuous    sodium chloride 0.9% bolus 250 mL PRN       Objective:     Vital Signs (Most Recent):  Temp: 98.2 °F (36.8 °C) (12/21/21 0701)  Pulse: 92 (12/21/21 0615)  Resp: 20 (12/21/21 0615)  BP: (!) 119/56 (12/21/21 0615)  SpO2: 98 % (12/21/21 0615)  O2 Device (Oxygen Therapy): ventilator (12/21/21 0346) Vital Signs (24h Range):  Temp:  [97.4 °F (36.3 °C)-99.3 °F (37.4 °C)] 98.2 °F (36.8 °C)  Pulse:  [] 92  Resp:  [7-22] 20  SpO2:  [86 %-100 %] 98 %  BP: ()/(46-85) 119/56     Weight: 97.6 kg (215 lb 2.7 oz) (12/21/21 0200)  Body mass index is 30.01 kg/m².  Body surface area is 2.21 meters squared.    I/O last 3 completed shifts:  In: 1794.2 [I.V.:424.2; NG/GT:670; IV Piggyback:700]  Out: 2520 [Urine:20; Other:2500]    Physical Exam  Vitals reviewed.   HENT:      Mouth/Throat:      Mouth: Mucous membranes are moist.   Cardiovascular:      Rate and Rhythm: Regular rhythm.   Pulmonary:       Comments: The patient remains ventilated  Abdominal:      Palpations: Abdomen is soft.   Musculoskeletal:      Cervical back: Neck supple.         Significant Labs:  BMP:   Recent Labs   Lab 12/20/21  0432   *   *   K 6.4*   CL 93*   CO2 16*   *   CREATININE 9.55*   CALCIUM 6.9*   MG 3.1*     CBC:   Recent Labs   Lab 12/20/21  0431   WBC 17.01*   RBC 3.13*   HGB 9.1*   HCT 25.3*      MCV 80.8   MCH 29.1   MCHC 36.0        Significant Imaging:  Labs: Reviewed

## 2021-12-21 NOTE — PROGRESS NOTES
[facility]  Nephrology  Progress Note    Patient Name: Og Garcia  MRN: [id number]  Admission Date: 12/13/2021  Hospital Length of Stay: 8 days  Attending Provider: Harish Cazares MD   Primary Care Physician: Hector Arndt DNP, FNP-C  Principal Problem:Septic shock    Subjective:     HPI: History per the chart as the patient is intubated.  This patient presented with abdominal pain.  He subsequently had a CT of the abdomen which showed subcutaneous air.  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  Urine output has been minimal.  Serum creatinine has trended up to 7.3.  Serum potassium is 4.3.  Nephrology is consulted for renal issues.  The CT abdomen did not show renal abnormality.      Interval History: The patient's condition is about the same.  He tolerated dialysis on yesterday.    Review of patient's allergies indicates:  No Known Allergies  Current Facility-Administered Medications   Medication Frequency    0.9%  NaCl infusion (for blood administration) Q24H PRN    0.9%  NaCl infusion PRN    acetaminophen tablet 500 mg Q6H PRN    ampicillin (OMNIPEN) 2 g in sodium chloride 0.9 % 100 mL IVPB (MB+) Q8H    clindamycin in D5W 900 mg/50 mL IVPB 900 mg Q8H    dextrose 50% injection 12.5 g PRN    fentaNYL 2500 mcg in D5W 250 mL infusion premix (titrating) (conc: 10 mcg/mL) Continuous    gentamicin - pharmacy to dose pharmacy to manage frequency    glucagon (human recombinant) injection 1 mg PRN    heparin (porcine) injection 4,000 Units PRN    heparin (porcine) injection 5,000 Units Q8H    insulin aspart U-100 injection 1-10 Units Q6H PRN    NORepinephrine 32 mg in dextrose 5 % 250 mL infusion Continuous    pantoprazole injection 40 mg Daily    propofol (DIPRIVAN) 10 mg/mL infusion Continuous    sodium chloride 0.9% bolus 250 mL PRN       Objective:     Vital Signs (Most Recent):  Temp: 98.2 °F (36.8 °C) (12/21/21 0701)  Pulse: 92 (12/21/21 0615)  Resp: 20  (12/21/21 0615)  BP: (!) 119/56 (12/21/21 0615)  SpO2: 98 % (12/21/21 0615)  O2 Device (Oxygen Therapy): ventilator (12/21/21 0346) Vital Signs (24h Range):  Temp:  [97.4 °F (36.3 °C)-99.3 °F (37.4 °C)] 98.2 °F (36.8 °C)  Pulse:  [] 92  Resp:  [7-22] 20  SpO2:  [86 %-100 %] 98 %  BP: ()/(46-85) 119/56     Weight: 97.6 kg (215 lb 2.7 oz) (12/21/21 0200)  Body mass index is 30.01 kg/m².  Body surface area is 2.21 meters squared.    I/O last 3 completed shifts:  In: 1794.2 [I.V.:424.2; NG/GT:670; IV Piggyback:700]  Out: 2520 [Urine:20; Other:2500]    Physical Exam  Vitals reviewed.   HENT:      Mouth/Throat:      Mouth: Mucous membranes are moist.   Cardiovascular:      Rate and Rhythm: Regular rhythm.   Pulmonary:      Comments: The patient remains ventilated  Abdominal:      Palpations: Abdomen is soft.   Musculoskeletal:      Cervical back: Neck supple.         Significant Labs:  BMP:   Recent Labs   Lab 12/20/21  0432   *   *   K 6.4*   CL 93*   CO2 16*   *   CREATININE 9.55*   CALCIUM 6.9*   MG 3.1*     CBC:   Recent Labs   Lab 12/20/21  0431   WBC 17.01*   RBC 3.13*   HGB 9.1*   HCT 25.3*      MCV 80.8   MCH 29.1   MCHC 36.0        Significant Imaging:  Labs: Reviewed    Assessment/Plan:     RAHAT (acute kidney injury)  Septic ATN  No renal recovery.   Low threshhold for dialysis support.  Ask for dialysis catheter  Hepatitis panel  12/16/2021 Continue with dialysis support  12/20/2021  No signs of renal recovery.  Continue with scheduled hemodialysis for this patient.  12/21/2021.  No signs of renal recovery.         Thank you for your consult. I will follow-up with patient. Please contact us if you have any additional questions.    Edwin Pryor Jr, MD  Nephrology  [facility]

## 2021-12-21 NOTE — PROGRESS NOTES
[facility]  Pulmonology  Progress Note    Patient Name: Og Garcia  MRN: [id number]  Admission Date: 12/13/2021  Hospital Length of Stay: 8 days  Code Status: Full Code  Attending Provider: Harish Cazares MD  Primary Care Provider: Hector Arndt DNP, FNP-C   Principal Problem: Septic shock    Subjective:     Interval History:  Patient awake alert    Objective:     Vital Signs (Most Recent):  Temp: 98.7 °F (37.1 °C) (12/21/21 0342)  Pulse: 92 (12/21/21 0600)  Resp: 20 (12/21/21 0600)  BP: 123/63 (12/21/21 0600)  SpO2: 99 % (12/21/21 0600) Vital Signs (24h Range):  Temp:  [97.2 °F (36.2 °C)-99.3 °F (37.4 °C)] 98.7 °F (37.1 °C)  Pulse:  [] 92  Resp:  [7-22] 20  SpO2:  [86 %-100 %] 99 %  BP: ()/(46-85) 123/63     Weight: 97.6 kg (215 lb 2.7 oz)  Body mass index is 30.01 kg/m².      Intake/Output Summary (Last 24 hours) at 12/21/2021 0616  Last data filed at 12/21/2021 0601  Gross per 24 hour   Intake 1178.59 ml   Output 2500 ml   Net -1321.41 ml       Physical Exam  Vitals reviewed.   Constitutional:       Appearance: Normal appearance.      Interventions: He is intubated.   HENT:      Head: Normocephalic and atraumatic.      Nose: Nose normal.      Mouth/Throat:      Mouth: Mucous membranes are dry.      Pharynx: Oropharynx is clear.   Eyes:      Extraocular Movements: Extraocular movements intact.      Conjunctiva/sclera: Conjunctivae normal.      Pupils: Pupils are equal, round, and reactive to light.   Cardiovascular:      Rate and Rhythm: Normal rate.      Heart sounds: Normal heart sounds. No murmur heard.      Pulmonary:      Effort: Pulmonary effort is normal. He is intubated.      Breath sounds: Normal breath sounds.   Abdominal:      General: Abdomen is flat. Bowel sounds are normal.      Palpations: Abdomen is soft.   Musculoskeletal:         General: Normal range of motion.      Cervical back: Normal range of motion and neck supple.      Right lower leg: No edema.       Left lower leg: No edema.   Skin:     General: Skin is warm and dry.      Capillary Refill: Capillary refill takes less than 2 seconds.   Neurological:      General: No focal deficit present.      Mental Status: He is alert and oriented to person, place, and time.   Psychiatric:         Mood and Affect: Mood normal.         Behavior: Behavior normal.         Vents:  Vent Mode: A/C (12/21/21 0346)  Set Rate: 20 BPM (12/21/21 0346)  Vt Set: 480 mL (12/21/21 0346)  Pressure Support: 12 cmH20 (12/20/21 2330)  PEEP/CPAP: 5 cmH20 (12/21/21 0346)  Oxygen Concentration (%): 30 (12/21/21 0346)  Peak Airway Pressure: 22 cmH2O (12/21/21 0346)  Plateau Pressure: 11 cmH20 (12/14/21 2330)  Total Ve: 6.6 mL (12/21/21 0346)  F/VT Ratio<105 (RSBI): (!) 34.97 (12/20/21 2330)    Lines/Drains/Airways     Central Venous Catheter Line                 Hemodialysis Catheter 12/15/21 0745 right internal jugular 5 days          Drain                 NG/OG Tube [place] sump 18 Fr. Left nostril -- days         Urethral Catheter 12/13/21 2130 16 Fr. 7 days         Colostomy 12/18/21 1030 Descending/sigmoid LUQ 2 days          Airway                 Airway - Non-Surgical 12/14/21 1047 6 days          Peripheral Intravenous Line                 Peripheral IV - Single Lumen 12/18/21 0533 20 G Left;Posterior Forearm 3 days         Peripheral IV - Single Lumen 12/19/21 2126 20 G Anterior;Right Upper Arm 1 day                Significant Labs:    CBC/Anemia Profile:  Recent Labs   Lab 12/20/21  0431   WBC 17.01*   HGB 9.1*   HCT 25.3*      MCV 80.8   RDW 14.3        Chemistries:  Recent Labs   Lab 12/20/21  0432   *   K 6.4*   CL 93*   CO2 16*   *   CREATININE 9.55*   CALCIUM 6.9*   MG 3.1*   PHOS 15.3*       All pertinent labs within the past 24 hours have been reviewed.    Significant Imaging:  I have reviewed all pertinent imaging results/findings within the past 24 hours.    Assessment/Plan:     Denice gangrene  Dr. Cazares  reviewed patient's wounds yesterday clinically seems to be improving    On mechanically assisted ventilation  Try spontaneous breathing trial today tolerated weaning trials yesterday      RAHAT (acute kidney injury)  Apparently got dialysis yesterday      Hypertension  Blood pressure better                 Jose Urban MD  Pulmonology  [facility]

## 2021-12-21 NOTE — ASSESSMENT & PLAN NOTE
Septic ATN  No renal recovery.   Low threshhold for dialysis support.  Ask for dialysis catheter  Hepatitis panel  12/16/2021 Continue with dialysis support  12/20/2021  No signs of renal recovery.  Continue with scheduled hemodialysis for this patient.  12/21/2021.  No signs of renal recovery.

## 2021-12-21 NOTE — PROGRESS NOTES
Progress Note                                                           Infectious disease   Admit Date: 12/13/2021    SUBJECTIVE:     Follow-up For:  Septic shock    HPI/Interval history:  Patient doing a little better, now off vasopressors as of yesterday.  No fever for over 48 hours.  Had repeat abdominal washout yesterday with change of wound VAC.      Review of Systems:    Unable to obtain as patient is sedated on the vent      OBJECTIVE:     Vital Signs Range (Last 24H):  Temp:  [97.8 °F (36.6 °C)-99.3 °F (37.4 °C)]   Pulse:  []   Resp:  [10-24]   BP: ()/(49-85)   SpO2:  [86 %-100 %]     Physical Exam:  Constitutional:  Sedated on the vent  HENT: supple   Head: normocephalic, atraumatic   Cardiovascular: regular rate and rhythm, S1, S2 normal, no murmur, click, rub or gallop  Pulmonary:no crepitations or wheezing appreciated  Gastrointestinal:  Colostomy present with greenish stool blood in bag, midline wound with VAC putting out serosanguineous fluid, bowel sounds not heard  :  Scant amount of concentrated urine from [place] catheter  Muscular/Skeletal: Lower extremities with edema, no clubbing  Skin: Skin color, texture normal. No rashes, ulcers or new lesions. Skin warm and dry.     Laboratory:  CBC:   Recent Labs   Lab 12/21/21  0723   WBC 19.97*   RBC 2.54*   HGB 7.5*   HCT 22.3*      MCV 87.8   MCH 29.5   MCHC 33.6     BMP:   Recent Labs   Lab 12/20/21  0432 12/20/21  0432 12/21/21  0723   *   < > 176*   *   < > 131*   K 6.4*   < > 5.5*   CL 93*   < > 94*   CO2 16*   < > 20*   *   < > 95*   CREATININE 9.55*   < > 8.29*   CALCIUM 6.9*   < > 7.0*   MG 3.1*  --   --     < > = values in this interval not displayed.     CMP:   Recent Labs   Lab 12/16/21  0432 12/17/21  0916 12/21/21  0723   *   < > 176*   CALCIUM 6.8*   < > 7.0*   ALBUMIN 1.2*  --   --    PROT 5.9*  --   --       < > 131*   K 3.8   < > 5.5*   CO2 16*   < > 20*      < > 94*   BUN 76*    < > 95*   CREATININE 8.21*   < > 8.29*   ALKPHOS 113  --   --    ALT 43  --   --    *  --   --    BILITOT 1.2  --   --     < > = values in this interval not displayed.     Microbiology Results (last 7 days)     Procedure Component Value Units Date/Time    Blood culture #1 **CANNOT BE ORDERED STAT** [[id number]] Collected: 12/13/21 2112    Order Status: Completed Specimen: Blood Updated: 12/19/21 0754     Culture, Blood No Growth at 5 days    Blood culture #2 **CANNOT BE ORDERED STAT** [[id number]] Collected: 12/13/21 2117    Order Status: Completed Specimen: Blood Updated: 12/19/21 0754     Culture, Blood No Growth at 5 days    Culture, Anaerobe [[id number]]  (Abnormal) Collected: 12/14/21 1242    Order Status: Completed Specimen: Abscess from Abdomen Updated: 12/18/21 1436     Culture, Anaerobe Bacteroides thetaiotaomicron    Culture, Anaerobe [[id number]]  (Abnormal) Collected: 12/13/21 2315    Order Status: Completed Specimen: Abscess from Perineum Updated: 12/18/21 1434     Culture, Anaerobe Bacteroides thetaiotaomicron      Clostridium clostridioforme    Culture, Wound [[id number]]  (Abnormal)  (Susceptibility) Collected: 12/14/21 1243    Order Status: Completed Specimen: Wound from Abdomen Updated: 12/17/21 0926     Culture, Wound/Abscess Rare growth Escherichia coli    Culture, Wound [[id number]]  (Abnormal)  (Susceptibility) Collected: 12/13/21 2315    Order Status: Completed Specimen: Abscess from Perineum Updated: 12/16/21 0849     Culture, Wound/Abscess Light Growth Escherichia coli    Urine culture [[id number]] Collected: 12/13/21 2133    Order Status: Completed Specimen: Urine Updated: 12/15/21 0729     Culture, Urine No Growth          Diagnostic Results:  Labs: Reviewed    ASSESSMENT/PLAN:     Active Hospital Problems    Diagnosis  POA    *Septic shock [A41.9, R65.21]  No    Antiplatelet or antithrombotic long-term use [Z79.02]  Not Applicable    Hypocalcemia [E83.51]  Yes    On mechanically  assisted ventilation [Z99.11]  Not Applicable    RAHAT (acute kidney injury) [N17.9]  Yes    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       ASSESSMENT:  1. Necrotizing fasciitis involving perineum and extending up into pelvis admitted 8 days ago.  E coli along with several anaerobes including Clostridium species isolated during I&D.  2. Septic shock due to #1, now resolved  3. Acute renal failure, anuric and now on dialysis  4. DM, likely uncontrolled        PLAN:  1. Patient has been on clindamycin for the past 8 days, acute phase is over and so now will replace clindamycin and ampicillin with ampicillin/sulbactam.  Probably needs antibiotics for at least another week or longer.  2. Continue aggressive wound care

## 2021-12-22 NOTE — PROGRESS NOTES
[facility] [place]  Pulmonology  Progress Note    Patient Name: Og Garcia  MRN: [id number]  Admission Date: 12/13/2021  Hospital Length of Stay: 9 days  Code Status: Full Code  Attending Provider: Harish Cazares MD  Primary Care Provider: Hector Arndt DNP, FNP-C   Principal Problem: Septic shock    Subjective:     Interval History:  Patient without complaints this morning awake alert    Objective:     Vital Signs (Most Recent):  Temp: 99.1 °F (37.3 °C) (12/22/21 0320)  Pulse: 88 (12/22/21 0330)  Resp: 20 (12/22/21 0330)  BP: (!) 151/71 (12/22/21 0330)  SpO2: 100 % (12/22/21 0330) Vital Signs (24h Range):  Temp:  [98.1 °F (36.7 °C)-99.1 °F (37.3 °C)] 99.1 °F (37.3 °C)  Pulse:  [] 88  Resp:  [10-32] 20  SpO2:  [89 %-100 %] 100 %  BP: ()/(51-91) 151/71     Weight: 97.8 kg (215 lb 9.8 oz)  Body mass index is 30.07 kg/m².      Intake/Output Summary (Last 24 hours) at 12/22/2021 0623  Last data filed at 12/21/2021 2315  Gross per 24 hour   Intake 919.83 ml   Output --   Net 919.83 ml       Physical Exam  Vitals reviewed.   Constitutional:       Appearance: Normal appearance.      Interventions: He is not intubated.  HENT:      Head: Normocephalic and atraumatic.      Nose: Nose normal.      Mouth/Throat:      Mouth: Mucous membranes are dry.      Pharynx: Oropharynx is clear.   Eyes:      Extraocular Movements: Extraocular movements intact.      Conjunctiva/sclera: Conjunctivae normal.      Pupils: Pupils are equal, round, and reactive to light.   Cardiovascular:      Rate and Rhythm: Normal rate.      Heart sounds: Normal heart sounds. No murmur heard.      Pulmonary:      Effort: Pulmonary effort is normal. He is not intubated.      Breath sounds: Normal breath sounds.   Abdominal:      General: Abdomen is flat. Bowel sounds are normal.      Palpations: Abdomen is soft.   Musculoskeletal:         General: Normal range of motion.      Cervical back: Normal range of motion and neck  supple.      Right lower leg: No edema.      Left lower leg: No edema.   Skin:     General: Skin is warm and dry.      Capillary Refill: Capillary refill takes less than 2 seconds.   Neurological:      General: No focal deficit present.      Mental Status: He is alert and oriented to person, place, and time.   Psychiatric:         Mood and Affect: Mood normal.         Behavior: Behavior normal.         Vents:  Vent Mode: A/C (12/22/21 0318)  Set Rate: 20 BPM (12/22/21 0318)  Vt Set: 480 mL (12/22/21 0318)  Pressure Support: 12 cmH20 (12/21/21 2306)  PEEP/CPAP: 5 cmH20 (12/22/21 0318)  Oxygen Concentration (%): 30 (12/22/21 0318)  Peak Airway Pressure: 21 cmH2O (12/22/21 0318)  Plateau Pressure: 341 cmH20 (12/21/21 1525)  Total Ve: 7.4 mL (12/22/21 0318)  F/VT Ratio<105 (RSBI): 2857.14 (12/21/21 1525)    Lines/Drains/Airways     Central Venous Catheter Line                 Hemodialysis Catheter 12/15/21 0745 right internal jugular 6 days          Drain                 NG/OG Tube [place] sump 18 Fr. Left nostril -- days         Urethral Catheter 12/13/21 2130 16 Fr. 8 days         Colostomy 12/18/21 1030 Descending/sigmoid LUQ 3 days          Airway                 Airway - Non-Surgical 12/14/21 1047 7 days          Peripheral Intravenous Line                 Peripheral IV - Single Lumen 12/18/21 0533 20 G Left;Posterior Forearm 4 days         Peripheral IV - Single Lumen 12/19/21 2126 20 G Anterior;Right Upper Arm 2 days                Significant Labs:    CBC/Anemia Profile:  Recent Labs   Lab 12/21/21  0723 12/22/21  0439   WBC 19.97* 21.80*   HGB 7.5* 8.0*   HCT 22.3* 23.0*    310   MCV 87.8 84.9   RDW 16.2* 16.3*        Chemistries:  Recent Labs   Lab 12/21/21  0723 12/22/21  0439   *  --    K 5.5*  --    CL 94*  --    CO2 20*  --    BUN 95*  --    CREATININE 8.29*  --    CALCIUM 7.0*  --    MG  --  3.2*       All pertinent labs within the past 24 hours have been reviewed.    Significant Imaging:  I  have reviewed all pertinent imaging results/findings within the past 24 hours.    Assessment/Plan:     Denice gangrene  Dr. Cazares reviewed patient's wounds yesterday clinically seems to be improving    On mechanically assisted ventilation  Increase weaning trials       RAHAT (acute kidney injury)  Continue dialysis per Renal service      Hypertension  Blood pressure better                 Jose Urban MD  Pulmonology  [facility]

## 2021-12-22 NOTE — SUBJECTIVE & OBJECTIVE
Interval History:  Patient without complaints this morning awake alert    Objective:     Vital Signs (Most Recent):  Temp: 99.1 °F (37.3 °C) (12/22/21 0320)  Pulse: 88 (12/22/21 0330)  Resp: 20 (12/22/21 0330)  BP: (!) 151/71 (12/22/21 0330)  SpO2: 100 % (12/22/21 0330) Vital Signs (24h Range):  Temp:  [98.1 °F (36.7 °C)-99.1 °F (37.3 °C)] 99.1 °F (37.3 °C)  Pulse:  [] 88  Resp:  [10-32] 20  SpO2:  [89 %-100 %] 100 %  BP: ()/(51-91) 151/71     Weight: 97.8 kg (215 lb 9.8 oz)  Body mass index is 30.07 kg/m².      Intake/Output Summary (Last 24 hours) at 12/22/2021 0623  Last data filed at 12/21/2021 2315  Gross per 24 hour   Intake 919.83 ml   Output --   Net 919.83 ml       Physical Exam  Vitals reviewed.   Constitutional:       Appearance: Normal appearance.      Interventions: He is not intubated.  HENT:      Head: Normocephalic and atraumatic.      Nose: Nose normal.      Mouth/Throat:      Mouth: Mucous membranes are dry.      Pharynx: Oropharynx is clear.   Eyes:      Extraocular Movements: Extraocular movements intact.      Conjunctiva/sclera: Conjunctivae normal.      Pupils: Pupils are equal, round, and reactive to light.   Cardiovascular:      Rate and Rhythm: Normal rate.      Heart sounds: Normal heart sounds. No murmur heard.      Pulmonary:      Effort: Pulmonary effort is normal. He is not intubated.      Breath sounds: Normal breath sounds.   Abdominal:      General: Abdomen is flat. Bowel sounds are normal.      Palpations: Abdomen is soft.   Musculoskeletal:         General: Normal range of motion.      Cervical back: Normal range of motion and neck supple.      Right lower leg: No edema.      Left lower leg: No edema.   Skin:     General: Skin is warm and dry.      Capillary Refill: Capillary refill takes less than 2 seconds.   Neurological:      General: No focal deficit present.      Mental Status: He is alert and oriented to person, place, and time.   Psychiatric:         Mood and  Affect: Mood normal.         Behavior: Behavior normal.         Vents:  Vent Mode: A/C (12/22/21 0318)  Set Rate: 20 BPM (12/22/21 0318)  Vt Set: 480 mL (12/22/21 0318)  Pressure Support: 12 cmH20 (12/21/21 2306)  PEEP/CPAP: 5 cmH20 (12/22/21 0318)  Oxygen Concentration (%): 30 (12/22/21 0318)  Peak Airway Pressure: 21 cmH2O (12/22/21 0318)  Plateau Pressure: 341 cmH20 (12/21/21 1525)  Total Ve: 7.4 mL (12/22/21 0318)  F/VT Ratio<105 (RSBI): 2857.14 (12/21/21 1525)    Lines/Drains/Airways     Central Venous Catheter Line                 Hemodialysis Catheter 12/15/21 0745 right internal jugular 6 days          Drain                 NG/OG Tube [place] sump 18 Fr. Left nostril -- days         Urethral Catheter 12/13/21 2130 16 Fr. 8 days         Colostomy 12/18/21 1030 Descending/sigmoid LUQ 3 days          Airway                 Airway - Non-Surgical 12/14/21 1047 7 days          Peripheral Intravenous Line                 Peripheral IV - Single Lumen 12/18/21 0533 20 G Left;Posterior Forearm 4 days         Peripheral IV - Single Lumen 12/19/21 2126 20 G Anterior;Right Upper Arm 2 days                Significant Labs:    CBC/Anemia Profile:  Recent Labs   Lab 12/21/21  0723 12/22/21  0439   WBC 19.97* 21.80*   HGB 7.5* 8.0*   HCT 22.3* 23.0*    310   MCV 87.8 84.9   RDW 16.2* 16.3*        Chemistries:  Recent Labs   Lab 12/21/21  0723 12/22/21  0439   *  --    K 5.5*  --    CL 94*  --    CO2 20*  --    BUN 95*  --    CREATININE 8.29*  --    CALCIUM 7.0*  --    MG  --  3.2*       All pertinent labs within the past 24 hours have been reviewed.    Significant Imaging:  I have reviewed all pertinent imaging results/findings within the past 24 hours.

## 2021-12-22 NOTE — ASSESSMENT & PLAN NOTE
S/p wound debridement  S/p wound washout today.  12/20/2021 Continue with dressing changes  12/22/2021 Continue with current therapy.

## 2021-12-22 NOTE — PROGRESS NOTES
[facility] ICU  Adult Nutrition  Consult Note         Reason for Assessment  Reason For Assessment: consult EN  Nutrition Risk Screen: no indicators present  Malnutrition  Is Patient Malnourished: No  Skin Integrity  Clayton Risk Assessment  Sensory Perception: 1-->completely limited  Moisture: 2-->very moist  Activity: 1-->bedfast  Mobility: 1-->completely immobile  Nutrition: 3-->adequate  Friction and Shear: 2-->potential problem  Clayton Score: 10  Comments on skin integrity: surgical  Nutrition Diagnosis  Altered GI function   related to Decreased G.I. tract structural integrity as evidenced by G.I. surgery and bleed    Nutrition Diagnosis Status: Improving      Comments: changing from elemental to semi-elemental today  Nutrition Risk  Level of Risk/Frequency of Follow-up: high  Comments on nutrition risk: EN is primary nutrition   Recent Labs   Lab 12/21/21  0723 12/22/21  0432 12/22/21  0439   GLU   < >  --  282*   POCGLU  --  313*  --     < > = values in this interval not displayed.     Comments on Glucose: Elevated as positive acute phase respondent  Nutrition Prescription / Recommendations  Recommendation/Intervention: Change EN and increase rate  Goals: EN tolerance, weight maintenance, wound healing, EN to goal  Nutrition Goal Status: new  Communication of RD Recs: reviewed with RN  Current Diet Order: NPO + EN    Recommended Diet: Enteral Nutrition  Recommended Oral Supplement: No Oral Supplements  Is Nutrition Support Recommended: Yes   Needs Calculated  Energy Need Method: [facility] Energy Calorie Requirements (kcal): 1808  Protein Requirements: 117-127  Enteral Nutrition   Enteral Nutrition Formula Provides:  1872 kcals  117 g Protein  168 g Carbohydrates  81 g Fat Propofol Rate: No  1170 ml Fluid without Flush    720 ml Fluid by flush   1890 ml Total Fluid  Enteral Nutrition Recommended Order:  Tube feeding via NG/ [place] Sump  Tube feeding formula: Vital AF 1.2 Continuous 65 ml/h  Free  Water Flush: 60 ml every 2 hours  Modular Supplements:No Modular Supplements needed  Enteral Nutrition meets needs?: yes  Enteral Nutrition Status: New Order    Is Education Recommended: No  Monitor and Evaluation  % current Intake: New Enteral Nutrition Order with no documentation  and Enteral Nutrition at goal  % intake to meet estimated needs: Enteral Nutrition   Food and Nutrient Intake: enteral nutrition intake  Food and Nutrient Adminstration: enteral and parenteral nutrition administration  Anthropometric Measurements: weight change  Biochemical Data, Medical Tests and Procedures: electrolyte and renal panel,glucose/endocrine profile  Nutrition-Focused Physical Findings: overall appearance  Energy Calories Required: not meeting needs  Protein Required: not meeting needs  Fluid Required: not meeting needs  Tolerance: tolerating  Current Medical Diagnosis and Past Medical History  Diagnosis: infection/sepsis  Past Medical History:   Diagnosis Date    Hyperlipidemia     Hypertension      Nutrition/Diet History  Spiritual, Cultural Beliefs, Spiritism Practices, Values that Affect Care: no  Food Allergies: NKFA  Factors Affecting Nutritional Intake: None identified at this time  Lab/Procedures/Meds  Recent Labs   Lab 12/20/21  0432 12/20/21  0512 12/21/21  0723 12/22/21  0432 12/22/21  0439   *   < >   < >  --  129*   K 6.4*   < >   < >  --  6.2*   *   < >   < >  --  112*   CREATININE 9.55*   < >   < >  --  9.63*   *   < >   < >  --  282*   POCGLU  --    < >  --  313*  --    CALCIUM 6.9*   < >   < >  --  7.3*   ALBUMIN  --   --   --   --  1.1*   CL 93*   < >   < >  --  90*   ALT  --   --   --   --  12*   AST  --   --   --   --  52*   PHOS 15.3*  --   --   --   --     < > = values in this interval not displayed.     Last A1c: No results found for: HGBA1C  Lab Results   Component Value Date    RBC 2.71 (L) 12/22/2021    HGB 8.0 (L) 12/22/2021    HCT 23.0 (L) 12/22/2021    MCV 84.9 12/22/2021     "[facility] 29.5 12/22/2021    [facility] 34.8 12/22/2021     Pertinent Labs Reviewed: reviewed  Pertinent Medications Reviewed: reviewed  Anthropometrics  Temp: 97 °F (36.1 °C)  Height Method: Stated  Height: 5' 11" (180.3 cm)  Height (inches): 71 in  Weight Method: Bed Scale  Weight: 97.8 kg (215 lb 9.8 oz)  Weight (lb): 215.61 lb  Ideal Body Weight (IBW), Male: 172 lb  % Ideal Body Weight, Male (lb): 125.35 %  BMI (Calculated): 30.1  BMI Grade: 30 - 34.9- obesity - grade I     Estimated/Assessed Needs  RMR (Bullitt-St. Jeor Equation): 1780.13     Total Ve: 7.2 mL Temp: 97 °F (36.1 °C)Axillary  Weight Used For Calorie Calculations: 97.8 kg (215 lb 9.8 oz)   Energy Need Method: [facility] Energy Calorie Requirements (kcal): 1808  Weight Used For Protein Calculations: 97.8 kg (215 lb 9.8 oz)  Protein Requirements: 117-127  Estimated Fluid Requirement Method: RDA Method    RDA Method (mL): 1808     Nutrition by Nursing  Diet/Nutrition Received: tube feeding           Last Bowel Movement: 12/21/21       NG/OG Tube [place] sump 18 Fr. Left nostril-Feeding Type: continuous,by pump       NG/OG Tube [place] sump 18 Fr. Left nostril-Current Rate (mL/hr):  (20)       NG/OG Tube [place] sump 18 Fr. Left nostril-Goal Rate (mL/hr):  (20)       NG/OG Tube [place] sump 18 Fr. Left nostril-Formula Name: vivonex RTF 1.0  Nutrition Follow-Up  RD Follow-up?: Yes  Assessment and Plan  No new Assessment & Plan notes have been filed under this hospital service since the last note was generated.  Service: Nutrition     "

## 2021-12-22 NOTE — PROGRESS NOTES
Gentamycin random level = 1 this am.  We will re-dose at end of dialysis today and continue to monitor.

## 2021-12-22 NOTE — RESPIRATORY THERAPY
12/21/2021 23:06 - pt. Placed on psv trial.  1222?2021 00:08-pt. Placed back on previous vent settings. Tolerated psv trial well.

## 2021-12-22 NOTE — PROGRESS NOTES
[facility]  Nephrology  Progress Note    Patient Name: Og Garcia  MRN: [id number]  Admission Date: 12/13/2021  Hospital Length of Stay: 9 days  Attending Provider: Harish Cazares MD   Primary Care Physician: Hector Arndt DNP, FNP-C  Principal Problem:Septic shock    Subjective:     HPI: History per the chart as the patient is intubated.  This patient presented with abdominal pain.  He subsequently had a CT of the abdomen which showed subcutaneous air.  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  Urine output has been minimal.  Serum creatinine has trended up to 7.3.  Serum potassium is 4.3.  Nephrology is consulted for renal issues.  The CT abdomen did not show renal abnormality.      Interval History: The patient remains ventilated.  He will open his eyes at times.  Serum potassium is 6.2.  Moreover, the serum BUN is 112 and creatinine is 9.6.    Review of patient's allergies indicates:  No Known Allergies  Current Facility-Administered Medications   Medication Frequency    0.9%  NaCl infusion (for blood administration) Q24H PRN    0.9%  NaCl infusion PRN    acetaminophen tablet 500 mg Q6H PRN    ampicillin (OMNIPEN) 2 g in sodium chloride 0.9 % 100 mL IVPB (MB+) Every Mon, Wed, Fri    ampicillin-sulbactam (UNASYN) 1.5 g in sodium chloride 0.9 % 100 mL IVPB (MB+) Q12H    dextrose 50% injection 12.5 g PRN    fentaNYL 2500 mcg in D5W 250 mL infusion premix (titrating) (conc: 10 mcg/mL) Continuous    gentamicin - pharmacy to dose pharmacy to manage frequency    glucagon (human recombinant) injection 1 mg PRN    heparin (porcine) injection 4,000 Units PRN    heparin (porcine) injection 5,000 Units Q8H    insulin aspart U-100 injection 1-10 Units Q6H PRN    NORepinephrine 32 mg in dextrose 5 % 250 mL infusion Continuous    pantoprazole injection 40 mg Daily    propofol (DIPRIVAN) 10 mg/mL infusion Continuous    sodium chloride 0.9% bolus 250 mL PRN        Objective:     Vital Signs (Most Recent):  Temp: 97 °F (36.1 °C) (12/22/21 0715)  Pulse: 82 (12/22/21 1000)  Resp: 13 (12/22/21 1000)  BP: (!) 173/68 (12/22/21 1000)  SpO2: 100 % (12/22/21 1000)  O2 Device (Oxygen Therapy): ventilator (12/22/21 0800) Vital Signs (24h Range):  Temp:  [97 °F (36.1 °C)-99.1 °F (37.3 °C)] 97 °F (36.1 °C)  Pulse:  [] 82  Resp:  [10-32] 13  SpO2:  [89 %-100 %] 100 %  BP: ()/(51-91) 173/68     Weight: 97.8 kg (215 lb 9.8 oz) (12/22/21 1036)  Body mass index is 30.07 kg/m².  Body surface area is 2.21 meters squared.    I/O last 3 completed shifts:  In: 1694.3 [I.V.:234.3; NG/GT:1010; IV Piggyback:450]  Out: 5 [Urine:5]    Physical Exam  Vitals reviewed.   Cardiovascular:      Rate and Rhythm: Regular rhythm.      Heart sounds: Normal heart sounds.   Pulmonary:      Breath sounds: Normal breath sounds.      Comments: The patient is ventilated  Abdominal:      Palpations: Abdomen is soft.         Significant Labs:  BMP:   Recent Labs   Lab 12/22/21  0439   *   *   K 6.2*   CL 90*   CO2 15*   *   CREATININE 9.63*   CALCIUM 7.3*   MG 3.2*     CBC:   Recent Labs   Lab 12/22/21  0439   WBC 21.80*   RBC 2.71*   HGB 8.0*   HCT 23.0*      MCV 84.9   MCH 29.5   MCHC 34.8        Significant Imaging:  Labs: Reviewed    Assessment/Plan:     RAHAT (acute kidney injury)  Septic ATN  No renal recovery.   Low threshhold for dialysis support.  Ask for dialysis catheter  Hepatitis panel  12/16/2021 Continue with dialysis support  12/20/2021  No signs of renal recovery.  Continue with scheduled hemodialysis for this patient.  12/21/2021.  No signs of renal recovery.   12/22/2021.  There is no signs of renal recovery.  Continuing with scheduled hemodialysis for this patient.    Denice gangrene  S/p wound debridement  S/p wound washout today.  12/20/2021 Continue with dressing changes  12/22/2021 Continue with current therapy.    The patient is critically  ill    Dialysis is scheduled for today.    Thank you for your consult. I will follow-up with patient. Please contact us if you have any additional questions.    Edwin Pryor Jr, MD  Nephrology  [facility]

## 2021-12-22 NOTE — SUBJECTIVE & OBJECTIVE
Interval History: The patient remains ventilated.  He will open his eyes at times.  Serum potassium is 6.2.  Moreover, the serum BUN is 112 and creatinine is 9.6.    Review of patient's allergies indicates:  No Known Allergies  Current Facility-Administered Medications   Medication Frequency    0.9%  NaCl infusion (for blood administration) Q24H PRN    0.9%  NaCl infusion PRN    acetaminophen tablet 500 mg Q6H PRN    ampicillin (OMNIPEN) 2 g in sodium chloride 0.9 % 100 mL IVPB (MB+) Every Mon, Wed, Fri    ampicillin-sulbactam (UNASYN) 1.5 g in sodium chloride 0.9 % 100 mL IVPB (MB+) Q12H    dextrose 50% injection 12.5 g PRN    fentaNYL 2500 mcg in D5W 250 mL infusion premix (titrating) (conc: 10 mcg/mL) Continuous    gentamicin - pharmacy to dose pharmacy to manage frequency    glucagon (human recombinant) injection 1 mg PRN    heparin (porcine) injection 4,000 Units PRN    heparin (porcine) injection 5,000 Units Q8H    insulin aspart U-100 injection 1-10 Units Q6H PRN    NORepinephrine 32 mg in dextrose 5 % 250 mL infusion Continuous    pantoprazole injection 40 mg Daily    propofol (DIPRIVAN) 10 mg/mL infusion Continuous    sodium chloride 0.9% bolus 250 mL PRN       Objective:     Vital Signs (Most Recent):  Temp: 97 °F (36.1 °C) (12/22/21 0715)  Pulse: 82 (12/22/21 1000)  Resp: 13 (12/22/21 1000)  BP: (!) 173/68 (12/22/21 1000)  SpO2: 100 % (12/22/21 1000)  O2 Device (Oxygen Therapy): ventilator (12/22/21 0800) Vital Signs (24h Range):  Temp:  [97 °F (36.1 °C)-99.1 °F (37.3 °C)] 97 °F (36.1 °C)  Pulse:  [] 82  Resp:  [10-32] 13  SpO2:  [89 %-100 %] 100 %  BP: ()/(51-91) 173/68     Weight: 97.8 kg (215 lb 9.8 oz) (12/22/21 1036)  Body mass index is 30.07 kg/m².  Body surface area is 2.21 meters squared.    I/O last 3 completed shifts:  In: 1694.3 [I.V.:234.3; NG/GT:1010; IV Piggyback:450]  Out: 5 [Urine:5]    Physical Exam  Vitals reviewed.   Cardiovascular:      Rate and Rhythm:  Regular rhythm.      Heart sounds: Normal heart sounds.   Pulmonary:      Breath sounds: Normal breath sounds.      Comments: The patient is ventilated  Abdominal:      Palpations: Abdomen is soft.         Significant Labs:  BMP:   Recent Labs   Lab 12/22/21 0439   *   *   K 6.2*   CL 90*   CO2 15*   *   CREATININE 9.63*   CALCIUM 7.3*   MG 3.2*     CBC:   Recent Labs   Lab 12/22/21 0439   WBC 21.80*   RBC 2.71*   HGB 8.0*   HCT 23.0*      MCV 84.9   MCH 29.5   MCHC 34.8        Significant Imaging:  Labs: Reviewed

## 2021-12-23 NOTE — ASSESSMENT & PLAN NOTE
- nephrology consulted for rapid acute rise in Cr  - thought to be related to septic ATN  - remains anuric  - Underwent HD 12/15 and 12/16, 12/17, likely HD today  - right IJ HD line   - 12/22/2021.  There is no sign of renal recovery.  Continuing with scheduled hemodialysis for this patient.

## 2021-12-23 NOTE — ASSESSMENT & PLAN NOTE
- s/p multiple debridements  - wound vac in place  - ID consulted for antibiotic management: He was treated with rocephin, daptomycin, clindamycin  - polymicrobial on cultures. E Coli +  - plan to go back to OR on Monday 12/27  - ID note reviewed and appreicated. From 12/21- change clindamycin to ampicillin and treat for another week

## 2021-12-23 NOTE — PLAN OF CARE
[facility] ICU  Discharge Final Note    Primary Care Provider: Hector Arndt DNP, FNP-C    Expected Discharge Date:     Final Discharge Note (most recent)     Final Note - 12/23/21 0942        Final Note    Assessment Type Final Discharge Note     Anticipated Discharge Disposition Long Term Acute Care        Post-Acute Status    Post-Acute Authorization Placement     Post-Acute Placement Status Set-up Complete/Auth obtained     Patient choice form signed by patient/caregiver List with quality metrics by geographic area provided;List from CMS Compare     Discharge Delays None known at this time                 Important Message from Medicare  Important Message from Medicare regarding Discharge Appeal Rights: Given to patient/caregiver,Explained to patient/caregiver,Signed/date by patient/caregiver     Date IMM was signed: 12/14/21  Time IMM was signed: 1414    pt for dc to [facility] today

## 2021-12-23 NOTE — NURSING
Report called to [facility], transfer to 103. Sister Virginia notified of room assignment
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
## 2021-12-23 NOTE — SUBJECTIVE & OBJECTIVE
Past Medical History:   Diagnosis Date    Hyperlipidemia     Hypertension        Past Surgical History:   Procedure Laterality Date    COLOSTOMY N/A 12/18/2021    Procedure: CREATION, COLOSTOMY;  Surgeon: Veronica Hui MD;  Location: [facility];  Service: General;  Laterality: N/A;    INCISION AND DRAINAGE OF ABSCESS N/A 12/13/2021    Procedure: INCISION AND DRAINAGE, ABSCESS PERINEAL REGION;  Surgeon: Harish Cazares MD;  Location: [facility];  Service: General;  Laterality: N/A;  perirectal       Review of patient's allergies indicates:  No Known Allergies    Family History     Problem Relation (Age of Onset)    Cancer Father    Diabetes Father    Hypertension Mother, Father        Tobacco Use    Smoking status: Current Every Day Smoker     Packs/day: 0.50     Types: Cigarettes    Smokeless tobacco: Never Used   Substance and Sexual Activity    Alcohol use: Not Currently    Drug use: Never    Sexual activity: Yes         Review of Systems   Unable to perform ROS: Intubated     Objective:     Vital Signs (Most Recent):    Vital Signs (24h Range):  Temp:  [97.6 °F (36.4 °C)-98.4 °F (36.9 °C)] 98.1 °F (36.7 °C)  Pulse:  [] 91  Resp:  [8-21] 11  SpO2:  [96 %-100 %] 100 %  BP: ()/() 161/63        There is no height or weight on file to calculate BMI.    No intake or output data in the 24 hours ending 12/23/21 1340    Physical Exam  Vitals reviewed.   Constitutional:       General: He is not in acute distress.     Appearance: He is ill-appearing.      Comments: Intubated and sedated   HENT:      Head: Normocephalic and atraumatic.      Comments: Right temporal deformity     Right Ear: External ear normal.      Left Ear: External ear normal.      Nose: Nose normal.      Mouth/Throat:      Mouth: Mucous membranes are moist.   Eyes:      Conjunctiva/sclera: Conjunctivae normal.      Pupils: Pupils are equal, round, and reactive to light.   Cardiovascular:      Rate and Rhythm: Normal rate  and regular rhythm.      Pulses: Normal pulses.      Heart sounds: Normal heart sounds.   Pulmonary:      Effort: No respiratory distress.      Breath sounds: No stridor. No wheezing, rhonchi or rales.      Comments: Clear breath sounds anteriorly  Abdominal:      Palpations: Abdomen is soft.      Tenderness: There is no guarding.      Comments: Anterior abdominal wall with wound vac in place. Ostomy   Genitourinary:     Comments: Packing in place  Musculoskeletal:         General: No swelling or deformity.      Cervical back: Neck supple.      Right lower leg: No edema.      Left lower leg: No edema.   Lymphadenopathy:      Cervical: No cervical adenopathy.   Skin:     General: Skin is warm and dry.   Neurological:      Cranial Nerves: No cranial nerve deficit.      Comments: Currently Intubated and sedated         Vents:       Lines/Drains/Airways     Central Venous Catheter Line                 Hemodialysis Catheter 12/15/21 0745 right internal jugular 8 days          Drain                 NG/OG Tube [place] sump 18 Fr. Left nostril -- days         Urethral Catheter 12/13/21 2130 16 Fr. 9 days         Colostomy 12/18/21 1030 Descending/sigmoid LUQ 5 days          Airway                 Airway - Non-Surgical 12/14/21 1047 9 days          Peripheral Intravenous Line                 Peripheral IV - Single Lumen 12/18/21 0533 20 G Left;Posterior Forearm 5 days         Peripheral IV - Single Lumen 12/19/21 2126 20 G Anterior;Right Upper Arm 3 days                Significant Labs:    CBC/Anemia Profile:  Recent Labs   Lab 12/22/21  0439   WBC 21.80*   HGB 8.0*   HCT 23.0*      MCV 84.9   RDW 16.3*        Chemistries:  Recent Labs   Lab 12/22/21  0439   *   K 6.2*   CL 90*   CO2 15*   *   CREATININE 9.63*   CALCIUM 7.3*   ALBUMIN 1.1*   PROT 7.3   BILITOT 2.8*   ALKPHOS 159*   ALT 12*   AST 52*   MG 3.2*       All pertinent labs within the past 24 hours have been reviewed.    Significant Imaging:   I  have reviewed all pertinent imaging results/findings within the past 24 hours.

## 2021-12-23 NOTE — ASSESSMENT & PLAN NOTE
Dr. Cazares reviewed patient's wounds yesterday clinically seems to be improving possible surgery Monday

## 2021-12-23 NOTE — SUBJECTIVE & OBJECTIVE
Interval History:  Patient awake alert    Objective:     Vital Signs (Most Recent):  Temp: 98.4 °F (36.9 °C) (12/23/21 0317)  Pulse: 71 (12/23/21 0315)  Resp: 20 (12/23/21 0315)  BP: (!) 126/56 (12/23/21 0315)  SpO2: 100 % (12/23/21 0315) Vital Signs (24h Range):  Temp:  [97 °F (36.1 °C)-98.4 °F (36.9 °C)] 98.4 °F (36.9 °C)  Pulse:  [] 71  Resp:  [8-27] 20  SpO2:  [93 %-100 %] 100 %  BP: ()/() 126/56     Weight: 96.1 kg (211 lb 13.8 oz)  Body mass index is 29.55 kg/m².      Intake/Output Summary (Last 24 hours) at 12/23/2021 0624  Last data filed at 12/23/2021 0317  Gross per 24 hour   Intake 90 ml   Output --   Net 90 ml       Physical Exam  Vitals reviewed.   Constitutional:       Appearance: Normal appearance.      Interventions: He is not intubated.  HENT:      Head: Normocephalic and atraumatic.      Nose: Nose normal.      Mouth/Throat:      Mouth: Mucous membranes are dry.      Pharynx: Oropharynx is clear.   Eyes:      Extraocular Movements: Extraocular movements intact.      Conjunctiva/sclera: Conjunctivae normal.      Pupils: Pupils are equal, round, and reactive to light.   Cardiovascular:      Rate and Rhythm: Normal rate.      Heart sounds: Normal heart sounds. No murmur heard.      Pulmonary:      Effort: Pulmonary effort is normal. He is not intubated.      Breath sounds: Normal breath sounds.   Abdominal:      General: Abdomen is flat. Bowel sounds are normal.      Palpations: Abdomen is soft.   Musculoskeletal:         General: Normal range of motion.      Cervical back: Normal range of motion and neck supple.      Right lower leg: No edema.      Left lower leg: No edema.   Skin:     General: Skin is warm and dry.      Capillary Refill: Capillary refill takes less than 2 seconds.   Neurological:      General: No focal deficit present.      Mental Status: He is alert and oriented to person, place, and time.   Psychiatric:         Mood and Affect: Mood normal.         Behavior:  Behavior normal.         Vents:  Vent Mode: A/C (12/23/21 0512)  Set Rate: 20 BPM (12/23/21 0512)  Vt Set: 480 mL (12/23/21 0512)  Pressure Support: 12 cmH20 (12/22/21 1500)  PEEP/CPAP: 5 cmH20 (12/23/21 0512)  Oxygen Concentration (%): 30 (12/23/21 0317)  Peak Airway Pressure: 20 cmH2O (12/23/21 0512)  Plateau Pressure: 304 cmH20 (12/22/21 1500)  Total Ve: 6.9 mL (12/23/21 0512)  F/VT Ratio<105 (RSBI): 1311.48 (12/22/21 1500)    Lines/Drains/Airways     Central Venous Catheter Line                 Hemodialysis Catheter 12/15/21 0745 right internal jugular 7 days          Drain                 NG/OG Tube [place] sump 18 Fr. Left nostril -- days         Urethral Catheter 12/13/21 2130 16 Fr. 9 days         Colostomy 12/18/21 1030 Descending/sigmoid LUQ 4 days          Airway                 Airway - Non-Surgical 12/14/21 1047 8 days          Peripheral Intravenous Line                 Peripheral IV - Single Lumen 12/18/21 0533 20 G Left;Posterior Forearm 5 days         Peripheral IV - Single Lumen 12/19/21 2126 20 G Anterior;Right Upper Arm 3 days                Significant Labs:    CBC/Anemia Profile:  Recent Labs   Lab 12/21/21  0723 12/22/21  0439   WBC 19.97* 21.80*   HGB 7.5* 8.0*   HCT 22.3* 23.0*    310   MCV 87.8 84.9   RDW 16.2* 16.3*        Chemistries:  Recent Labs   Lab 12/21/21  0723 12/22/21  0439   * 129*   K 5.5* 6.2*   CL 94* 90*   CO2 20* 15*   BUN 95* 112*   CREATININE 8.29* 9.63*   CALCIUM 7.0* 7.3*   ALBUMIN  --  1.1*   PROT  --  7.3   BILITOT  --  2.8*   ALKPHOS  --  159*   ALT  --  12*   AST  --  52*   MG  --  3.2*       All pertinent labs within the past 24 hours have been reviewed.    Significant Imaging:  I have reviewed all pertinent imaging results/findings within the past 24 hours.

## 2021-12-23 NOTE — ASSESSMENT & PLAN NOTE
- remained intubated post-operatively; has returned to the OR multiple times  - on cpap during my exam and doing well  - plan is for patient to go back to OR soon. Will keep intubated for now  - once no plans for surgery will get patient extubated  - If there is a delay in going to surgery can consider extubating

## 2021-12-23 NOTE — PLAN OF CARE
Per dr phillip pt would benefit from ltac, attempted to contact pts dtr but no ans, left , called and spoke with pts sister virginia and sawyer for [facility], spoke with guilherme at [facility] and pt accepted for today

## 2021-12-23 NOTE — PROGRESS NOTES
Patient is chronically ill-appearing he remains intubated and sedate.    Physical exam general patient is chronically ill-appearing, heart is regular rate and rhythm, he has trace pretibial edema, lungs reveal coarse breath sounds with upper airway noises, abdomen is soft with decreased bowel sounds    Assessment/plan 1. Denice's gangrene-continue antibiotics  2. Sepsis  3. Respiratory failure-continue vent support  4. Acute renal failure-continue HD support  5. Anemia-patient's hematocrit is 23%
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
## 2021-12-23 NOTE — PROGRESS NOTES
[facility]  General Surgery  Progress Note    Subjective:     Interval History:  Patient doing well overall.  Patient seen yesterday and today on rounds. (document missing from yesterday)  wound VAC was replaced at the bedside yesterday and did not have that much granulation tissue yet.    Post-Op Info:  Procedure(s) (LRB):  LAPAROTOMY, EXPLORATORY (N/A)  CREATION, COLOSTOMY (N/A)  IRRIGATION AND DEBRIDEMENT (Right)   5 Days Post-Op      Medications:  Continuous Infusions:   fentanyl 150 mcg/hr (12/23/21 0600)    NORepinephrine bitartrate-D5W 0.02 mcg/kg/min (12/20/21 2301)    propofoL Stopped (12/19/21 0800)     Scheduled Meds:   ampicillin IVPB  2 g Intravenous Every Mon, Wed, Fri    ampicillin-sulbactim (UNASYN) IVPB  1.5 g Intravenous Q12H    heparin (porcine)  5,000 Units Subcutaneous Q8H    pantoprazole  40 mg Intravenous Daily     PRN Meds:sodium chloride, sodium chloride 0.9%, acetaminophen, dextrose 50%, glucagon (human recombinant), heparin (porcine), insulin aspart U-100, sodium chloride 0.9%     Objective:     Vital Signs (Most Recent):  Temp: 97.9 °F (36.6 °C) (12/23/21 0701)  Pulse: 68 (12/23/21 0749)  Resp: 17 (12/23/21 0749)  BP: (!) 126/56 (12/23/21 0315)  SpO2: 100 % (12/23/21 0749) Vital Signs (24h Range):  Temp:  [97.5 °F (36.4 °C)-98.4 °F (36.9 °C)] 97.9 °F (36.6 °C)  Pulse:  [] 68  Resp:  [8-27] 17  SpO2:  [93 %-100 %] 100 %  BP: ()/() 126/56       Intake/Output Summary (Last 24 hours) at 12/23/2021 0847  Last data filed at 12/23/2021 0600  Gross per 24 hour   Intake 277.25 ml   Output --   Net 277.25 ml       Physical Exam  Constitutional:       General: He is not in acute distress.  HENT:      Head: Normocephalic.   Cardiovascular:      Rate and Rhythm: Normal rate and regular rhythm.      Pulses: Normal pulses.   Pulmonary:      Effort: Pulmonary effort is normal. No respiratory distress.      Breath sounds: Normal breath sounds.   Abdominal:       General: Abdomen is flat. There is no distension.      Palpations: Abdomen is soft.      Tenderness: There is no abdominal tenderness.      Comments: Wound VAC removed yesterday and replaced.  Not much granulation tissue in the deep aspect.  No purulence appreciated any the pocket.   Musculoskeletal:         General: Normal range of motion.   Skin:     General: Skin is warm.   Neurological:      General: No focal deficit present.      Mental Status: He is oriented to person, place, and time.         Significant Labs:  CBC:   Recent Labs   Lab 12/22/21  0439   WBC 21.80*   RBC 2.71*   HGB 8.0*   HCT 23.0*      MCV 84.9   MCH 29.5   MCHC 34.8     CMP:   Recent Labs   Lab 12/22/21 0439   *   CALCIUM 7.3*   ALBUMIN 1.1*   PROT 7.3   *   K 6.2*   CO2 15*   CL 90*   *   CREATININE 9.63*   ALKPHOS 159*   ALT 12*   AST 52*   BILITOT 2.8*       Significant Diagnostics:  None    Assessment/Plan:     Active Diagnoses:    Diagnosis Date Noted POA    PRINCIPAL PROBLEM:  Septic shock [A41.9, R65.21] 12/16/2021 No    Antiplatelet or antithrombotic long-term use [Z79.02] 12/17/2021 Not Applicable    Hypocalcemia [E83.51] 12/16/2021 Yes    On mechanically assisted ventilation [Z99.11] 12/14/2021 Not Applicable    RAHAT (acute kidney injury) [N17.9] 12/14/2021 Yes    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] 12/13/2021 Yes    Hypertension [I10] 09/02/2021 Yes      Problems Resolved During this Admission:     Will keep patient intubated till Monday.  Continue to change wound VAC at the bedside and keep intubated for comfort.  Plan for dialysis catheter and partial closure on Monday.  This was discussed with the patient's daughter and extended family both over the phone.  All questions were answered.     Harish Cazares MD  General Surgery  [facility]

## 2021-12-23 NOTE — DISCHARGE SUMMARY
[facility]  Discharge Note  Short Stay    * No surgery found *    OUTCOME: Patient tolerated treatment/procedure well without complication and is now ready for discharge.    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  The infection spread up into his abdominal rectus sheath areas and in the retroperitoneum.  Patient require multiple debridements operations to control the infection as well as a colostomy.  He has been kept intubated for wound VAC changes at the bedside and will likely need to be kept intubated for least another week.  [facility] assessment and will eventually be closed sometime next week.  Nephrology was also consulted for dialysis access which was placed in the right IJ.  A tunneled dialysis catheter will also likely be necessary shortly.  Patient was discharged in stable but guarded condition.      DISPOSITION: Skilled Nursing Facility    FINAL DIAGNOSIS:  <principal problem not specified>    FOLLOWUP: In clinic    DISCHARGE INSTRUCTIONS:  No discharge procedures on file.     TIME SPENT ON DISCHARGE: 20 minutes

## 2021-12-23 NOTE — PROGRESS NOTES
[facility] [place]  Pulmonology  Progress Note    Patient Name: Og Garcia  MRN: [id number]  Admission Date: 12/13/2021  Hospital Length of Stay: 10 days  Code Status: Full Code  Attending Provider: Harish Cazares MD  Primary Care Provider: Hector Arndt DNP, FNP-C   Principal Problem: Septic shock    Subjective:     Interval History:  Patient awake alert    Objective:     Vital Signs (Most Recent):  Temp: 98.4 °F (36.9 °C) (12/23/21 0317)  Pulse: 71 (12/23/21 0315)  Resp: 20 (12/23/21 0315)  BP: (!) 126/56 (12/23/21 0315)  SpO2: 100 % (12/23/21 0315) Vital Signs (24h Range):  Temp:  [97 °F (36.1 °C)-98.4 °F (36.9 °C)] 98.4 °F (36.9 °C)  Pulse:  [] 71  Resp:  [8-27] 20  SpO2:  [93 %-100 %] 100 %  BP: ()/() 126/56     Weight: 96.1 kg (211 lb 13.8 oz)  Body mass index is 29.55 kg/m².      Intake/Output Summary (Last 24 hours) at 12/23/2021 0624  Last data filed at 12/23/2021 0317  Gross per 24 hour   Intake 90 ml   Output --   Net 90 ml       Physical Exam  Vitals reviewed.   Constitutional:       Appearance: Normal appearance.      Interventions: He is not intubated.  HENT:      Head: Normocephalic and atraumatic.      Nose: Nose normal.      Mouth/Throat:      Mouth: Mucous membranes are dry.      Pharynx: Oropharynx is clear.   Eyes:      Extraocular Movements: Extraocular movements intact.      Conjunctiva/sclera: Conjunctivae normal.      Pupils: Pupils are equal, round, and reactive to light.   Cardiovascular:      Rate and Rhythm: Normal rate.      Heart sounds: Normal heart sounds. No murmur heard.      Pulmonary:      Effort: Pulmonary effort is normal. He is not intubated.      Breath sounds: Normal breath sounds.   Abdominal:      General: Abdomen is flat. Bowel sounds are normal.      Palpations: Abdomen is soft.   Musculoskeletal:         General: Normal range of motion.      Cervical back: Normal range of motion and neck supple.      Right lower leg: No edema.       Left lower leg: No edema.   Skin:     General: Skin is warm and dry.      Capillary Refill: Capillary refill takes less than 2 seconds.   Neurological:      General: No focal deficit present.      Mental Status: He is alert and oriented to person, place, and time.   Psychiatric:         Mood and Affect: Mood normal.         Behavior: Behavior normal.         Vents:  Vent Mode: A/C (12/23/21 0512)  Set Rate: 20 BPM (12/23/21 0512)  Vt Set: 480 mL (12/23/21 0512)  Pressure Support: 12 cmH20 (12/22/21 1500)  PEEP/CPAP: 5 cmH20 (12/23/21 0512)  Oxygen Concentration (%): 30 (12/23/21 0317)  Peak Airway Pressure: 20 cmH2O (12/23/21 0512)  Plateau Pressure: 304 cmH20 (12/22/21 1500)  Total Ve: 6.9 mL (12/23/21 0512)  F/VT Ratio<105 (RSBI): 1311.48 (12/22/21 1500)    Lines/Drains/Airways     Central Venous Catheter Line                 Hemodialysis Catheter 12/15/21 0745 right internal jugular 7 days          Drain                 NG/OG Tube Cooper sump 18 Fr. Left nostril -- days         Urethral Catheter 12/13/21 2130 16 Fr. 9 days         Colostomy 12/18/21 1030 Descending/sigmoid LUQ 4 days          Airway                 Airway - Non-Surgical 12/14/21 1047 8 days          Peripheral Intravenous Line                 Peripheral IV - Single Lumen 12/18/21 0533 20 G Left;Posterior Forearm 5 days         Peripheral IV - Single Lumen 12/19/21 2126 20 G Anterior;Right Upper Arm 3 days                Significant Labs:    CBC/Anemia Profile:  Recent Labs   Lab 12/21/21  0723 12/22/21  0439   WBC 19.97* 21.80*   HGB 7.5* 8.0*   HCT 22.3* 23.0*    310   MCV 87.8 84.9   RDW 16.2* 16.3*        Chemistries:  Recent Labs   Lab 12/21/21  0723 12/22/21  0439   * 129*   K 5.5* 6.2*   CL 94* 90*   CO2 20* 15*   BUN 95* 112*   CREATININE 8.29* 9.63*   CALCIUM 7.0* 7.3*   ALBUMIN  --  1.1*   PROT  --  7.3   BILITOT  --  2.8*   ALKPHOS  --  159*   ALT  --  12*   AST  --  52*   MG  --  3.2*       All pertinent labs within the  past 24 hours have been reviewed.    Significant Imaging:  I have reviewed all pertinent imaging results/findings within the past 24 hours.    Assessment/Plan:     Denice gangrene  Dr. Cazares reviewed patient's wounds yesterday clinically seems to be improving possible surgery Monday    On mechanically assisted ventilation  Increase weaning trials and willing to extubate when okay with     RAHAT (acute kidney injury)  Continue dialysis per Renal service      Hypertension  Blood pressure stable                 Jose Urban MD  Pulmonology  [facility] [place]

## 2021-12-23 NOTE — H&P (VIEW-ONLY)
[facility]  General Surgery  Progress Note    Subjective:     Interval History:  Patient doing well overall.  Patient seen yesterday and today on rounds. (document missing from yesterday)  wound VAC was replaced at the bedside yesterday and did not have that much granulation tissue yet.    Post-Op Info:  Procedure(s) (LRB):  LAPAROTOMY, EXPLORATORY (N/A)  CREATION, COLOSTOMY (N/A)  IRRIGATION AND DEBRIDEMENT (Right)   5 Days Post-Op      Medications:  Continuous Infusions:   fentanyl 150 mcg/hr (12/23/21 0600)    NORepinephrine bitartrate-D5W 0.02 mcg/kg/min (12/20/21 2301)    propofoL Stopped (12/19/21 0800)     Scheduled Meds:   ampicillin IVPB  2 g Intravenous Every Mon, Wed, Fri    ampicillin-sulbactim (UNASYN) IVPB  1.5 g Intravenous Q12H    heparin (porcine)  5,000 Units Subcutaneous Q8H    pantoprazole  40 mg Intravenous Daily     PRN Meds:sodium chloride, sodium chloride 0.9%, acetaminophen, dextrose 50%, glucagon (human recombinant), heparin (porcine), insulin aspart U-100, sodium chloride 0.9%     Objective:     Vital Signs (Most Recent):  Temp: 97.9 °F (36.6 °C) (12/23/21 0701)  Pulse: 68 (12/23/21 0749)  Resp: 17 (12/23/21 0749)  BP: (!) 126/56 (12/23/21 0315)  SpO2: 100 % (12/23/21 0749) Vital Signs (24h Range):  Temp:  [97.5 °F (36.4 °C)-98.4 °F (36.9 °C)] 97.9 °F (36.6 °C)  Pulse:  [] 68  Resp:  [8-27] 17  SpO2:  [93 %-100 %] 100 %  BP: ()/() 126/56       Intake/Output Summary (Last 24 hours) at 12/23/2021 0847  Last data filed at 12/23/2021 0600  Gross per 24 hour   Intake 277.25 ml   Output --   Net 277.25 ml       Physical Exam  Constitutional:       General: He is not in acute distress.  HENT:      Head: Normocephalic.   Cardiovascular:      Rate and Rhythm: Normal rate and regular rhythm.      Pulses: Normal pulses.   Pulmonary:      Effort: Pulmonary effort is normal. No respiratory distress.      Breath sounds: Normal breath sounds.   Abdominal:       General: Abdomen is flat. There is no distension.      Palpations: Abdomen is soft.      Tenderness: There is no abdominal tenderness.      Comments: Wound VAC removed yesterday and replaced.  Not much granulation tissue in the deep aspect.  No purulence appreciated any the pocket.   Musculoskeletal:         General: Normal range of motion.   Skin:     General: Skin is warm.   Neurological:      General: No focal deficit present.      Mental Status: He is oriented to person, place, and time.         Significant Labs:  CBC:   Recent Labs   Lab 12/22/21  0439   WBC 21.80*   RBC 2.71*   HGB 8.0*   HCT 23.0*      MCV 84.9   MCH 29.5   MCHC 34.8     CMP:   Recent Labs   Lab 12/22/21 0439   *   CALCIUM 7.3*   ALBUMIN 1.1*   PROT 7.3   *   K 6.2*   CO2 15*   CL 90*   *   CREATININE 9.63*   ALKPHOS 159*   ALT 12*   AST 52*   BILITOT 2.8*       Significant Diagnostics:  None    Assessment/Plan:     Active Diagnoses:    Diagnosis Date Noted POA    PRINCIPAL PROBLEM:  Septic shock [A41.9, R65.21] 12/16/2021 No    Antiplatelet or antithrombotic long-term use [Z79.02] 12/17/2021 Not Applicable    Hypocalcemia [E83.51] 12/16/2021 Yes    On mechanically assisted ventilation [Z99.11] 12/14/2021 Not Applicable    RAHAT (acute kidney injury) [N17.9] 12/14/2021 Yes    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] 12/13/2021 Yes    Hypertension [I10] 09/02/2021 Yes      Problems Resolved During this Admission:     Will keep patient intubated till Monday.  Continue to change wound VAC at the bedside and keep intubated for comfort.  Plan for dialysis catheter and partial closure on Monday.  This was discussed with the patient's daughter and extended family both over the phone.  All questions were answered.     Harish Cazares MD  General Surgery  [facility]

## 2021-12-23 NOTE — RESPIRATORY THERAPY
Vent Type: VC   Vent Mode: A/C   Rate 20   Tidal Volume 480   PEEP 5   FIO2 30   I-Time: 0     0749-place pt on CPAP 12/5 30%  1357- place pt back on previous settings; pt tolerated trial well.

## 2021-12-24 NOTE — PLAN OF CARE
Problem: Communication Impairment (Mechanical Ventilation, Invasive)  Goal: Effective Communication  Outcome: Ongoing, Progressing     Problem: Device-Related Complication Risk (Mechanical Ventilation, Invasive)  Goal: Optimal Device Function  Outcome: Ongoing, Progressing

## 2021-12-24 NOTE — PROGRESS NOTES
Patient is seen on hemodialysis, he is tolerating this well, we will continue his treatment unchanged.    Physical exam general patient is chronically ill-appearing, he is intubated and sedate, he did open his eyes and seem to move it towards my direction when I spoke, he has mild lower extremity edema    Assessment/plan 1. Denice's gangrene-continue antibiotics  2. Sepsis  3. Respiratory failure-continue vent support  4. Acute renal failure-continue HD support, urine output was 40 cc yesterday  5. Anemia-patient's hematocrit is 21% down from 23% yesterday, continue to monitor

## 2021-12-24 NOTE — NURSING
0500 dressing change done to wound to abdomen due to wound vac having  air leak keaton well will monitor

## 2021-12-24 NOTE — ASSESSMENT & PLAN NOTE
- remained intubated post-operatively; has returned to the OR multiple times  - on cpap during my exam and doing well  - plan is for patient to go back to OR soon. Will keep intubated for now  - once no plans for surgery will get patient extubated  - If there is a delay in going to surgery can consider extubating  Do weaning trials to keep him conditioned

## 2021-12-24 NOTE — SUBJECTIVE & OBJECTIVE
Interval History:  Patient resting    Objective:     Vital Signs (Most Recent):  Temp: 97.8 °F (36.6 °C) (12/24/21 0000)  Pulse: 87 (12/24/21 0400)  Resp: 15 (12/24/21 0400)  BP: (!) 143/59 (12/24/21 0400)  SpO2: 100 % (12/24/21 0400) Vital Signs (24h Range):  Temp:  [97.6 °F (36.4 °C)-98.9 °F (37.2 °C)] 97.8 °F (36.6 °C)  Pulse:  [] 87  Resp:  [9-26] 15  SpO2:  [99 %-100 %] 100 %  BP: ()/() 143/59     Weight: 91.3 kg (201 lb 4.5 oz)  Body mass index is 28.07 kg/m².      Intake/Output Summary (Last 24 hours) at 12/24/2021 0534  Last data filed at 12/23/2021 1800  Gross per 24 hour   Intake 80 ml   Output 10 ml   Net 70 ml       Physical Exam  Vitals reviewed.   Constitutional:       Appearance: Normal appearance.      Interventions: He is intubated.   HENT:      Head: Normocephalic and atraumatic.      Nose: Nose normal.      Mouth/Throat:      Mouth: Mucous membranes are dry.      Pharynx: Oropharynx is clear.   Eyes:      Extraocular Movements: Extraocular movements intact.      Conjunctiva/sclera: Conjunctivae normal.      Pupils: Pupils are equal, round, and reactive to light.   Cardiovascular:      Rate and Rhythm: Normal rate.      Heart sounds: Normal heart sounds. No murmur heard.      Pulmonary:      Effort: Pulmonary effort is normal. He is intubated.      Breath sounds: Normal breath sounds.   Abdominal:      General: Abdomen is flat. Bowel sounds are normal.      Palpations: Abdomen is soft.   Musculoskeletal:         General: Normal range of motion.      Cervical back: Normal range of motion and neck supple.      Right lower leg: No edema.      Left lower leg: No edema.   Skin:     General: Skin is warm and dry.      Capillary Refill: Capillary refill takes less than 2 seconds.   Neurological:      General: No focal deficit present.      Mental Status: He is alert and oriented to person, place, and time.   Psychiatric:         Mood and Affect: Mood normal.         Behavior: Behavior  normal.         Vents:  Vent Mode: A/C (pt. placed back on acv) (12/24/21 0223)  Set Rate: 16 BPM (12/24/21 0223)  Vt Set: 480 mL (12/24/21 0223)  Pressure Support: 12 cmH20 (12/23/21 2331)  PEEP/CPAP: 5 cmH20 (12/24/21 0223)  Oxygen Concentration (%): 40 (12/24/21 0400)  Peak Airway Pressure: 22 cmH2O (12/24/21 0223)  Total Ve: 8.8 mL (12/24/21 0223)    Lines/Drains/Airways     Central Venous Catheter Line                 Hemodialysis Catheter 12/15/21 0745 right internal jugular 8 days          Drain                 NG/OG Tube [place] sump 18 Fr. Left nostril -- days         Urethral Catheter 12/13/21 2130 16 Fr. 10 days         Colostomy 12/18/21 1030 Descending/sigmoid LUQ 5 days          Airway                 Airway - Non-Surgical 12/14/21 1047 9 days          Peripheral Intravenous Line                 Peripheral IV - Single Lumen 12/18/21 0533 20 G Left;Posterior Forearm 6 days         Peripheral IV - Single Lumen 12/19/21 2126 20 G Anterior;Right Upper Arm 4 days                Significant Labs:    CBC/Anemia Profile:  No results for input(s): WBC, HGB, HCT, PLT, MCV, RDW, IRON, FERRITIN, RETIC, FOLATE, [id number], OCCULTBLOOD in the last 48 hours.     Chemistries:  No results for input(s): NA, K, CL, CO2, BUN, CREATININE, CALCIUM, ALBUMIN, PROT, BILITOT, ALKPHOS, ALT, AST, GLUCOSE, MG, PHOS in the last 48 hours.    All pertinent labs within the past 24 hours have been reviewed.    Significant Imaging:  I have reviewed all pertinent imaging results/findings within the past 24 hours.

## 2021-12-24 NOTE — RESPIRATORY THERAPY
12/23/2021 2017 pt. Placed on cpap with psv 12.    12/24/2021 0223 pt. Placed back on previous settings. Pt. Tolerated psv12 cpap trial well.

## 2021-12-24 NOTE — PROGRESS NOTES
[facility]  Pulmonology  Progress Note    Patient Name: Og Garcia  MRN: [id number]  Admission Date: 12/23/2021  Hospital Length of Stay: 1 days  Code Status: Full Code  Attending Provider: Cecil Abernathy DO  Primary Care Provider: Hector Arndt DNP, FNP-C   Principal Problem: <principal problem not specified>    Subjective:     Interval History:  Patient resting    Objective:     Vital Signs (Most Recent):  Temp: 97.8 °F (36.6 °C) (12/24/21 0000)  Pulse: 87 (12/24/21 0400)  Resp: 15 (12/24/21 0400)  BP: (!) 143/59 (12/24/21 0400)  SpO2: 100 % (12/24/21 0400) Vital Signs (24h Range):  Temp:  [97.6 °F (36.4 °C)-98.9 °F (37.2 °C)] 97.8 °F (36.6 °C)  Pulse:  [] 87  Resp:  [9-26] 15  SpO2:  [99 %-100 %] 100 %  BP: ()/() 143/59     Weight: 91.3 kg (201 lb 4.5 oz)  Body mass index is 28.07 kg/m².      Intake/Output Summary (Last 24 hours) at 12/24/2021 0534  Last data filed at 12/23/2021 1800  Gross per 24 hour   Intake 80 ml   Output 10 ml   Net 70 ml       Physical Exam  Vitals reviewed.   Constitutional:       Appearance: Normal appearance.      Interventions: He is intubated.   HENT:      Head: Normocephalic and atraumatic.      Nose: Nose normal.      Mouth/Throat:      Mouth: Mucous membranes are dry.      Pharynx: Oropharynx is clear.   Eyes:      Extraocular Movements: Extraocular movements intact.      Conjunctiva/sclera: Conjunctivae normal.      Pupils: Pupils are equal, round, and reactive to light.   Cardiovascular:      Rate and Rhythm: Normal rate.      Heart sounds: Normal heart sounds. No murmur heard.      Pulmonary:      Effort: Pulmonary effort is normal. He is intubated.      Breath sounds: Normal breath sounds.   Abdominal:      General: Abdomen is flat. Bowel sounds are normal.      Palpations: Abdomen is soft.   Musculoskeletal:         General: Normal range of motion.      Cervical back: Normal range of motion and neck supple.      Right lower  leg: No edema.      Left lower leg: No edema.   Skin:     General: Skin is warm and dry.      Capillary Refill: Capillary refill takes less than 2 seconds.   Neurological:      General: No focal deficit present.      Mental Status: He is alert and oriented to person, place, and time.   Psychiatric:         Mood and Affect: Mood normal.         Behavior: Behavior normal.         Vents:  Vent Mode: A/C (pt. placed back on acv) (12/24/21 0223)  Set Rate: 16 BPM (12/24/21 0223)  Vt Set: 480 mL (12/24/21 0223)  Pressure Support: 12 cmH20 (12/23/21 2331)  PEEP/CPAP: 5 cmH20 (12/24/21 0223)  Oxygen Concentration (%): 40 (12/24/21 0400)  Peak Airway Pressure: 22 cmH2O (12/24/21 0223)  Total Ve: 8.8 mL (12/24/21 0223)    Lines/Drains/Airways     Central Venous Catheter Line                 Hemodialysis Catheter 12/15/21 0745 right internal jugular 8 days          Drain                 NG/OG Tube [place] sump 18 Fr. Left nostril -- days         Urethral Catheter 12/13/21 2130 16 Fr. 10 days         Colostomy 12/18/21 1030 Descending/sigmoid LUQ 5 days          Airway                 Airway - Non-Surgical 12/14/21 1047 9 days          Peripheral Intravenous Line                 Peripheral IV - Single Lumen 12/18/21 0533 20 G Left;Posterior Forearm 6 days         Peripheral IV - Single Lumen 12/19/21 2126 20 G Anterior;Right Upper Arm 4 days                Significant Labs:    CBC/Anemia Profile:  No results for input(s): WBC, HGB, HCT, PLT, MCV, RDW, IRON, FERRITIN, RETIC, FOLATE, [id number], OCCULTBLOOD in the last 48 hours.     Chemistries:  No results for input(s): NA, K, CL, CO2, BUN, CREATININE, CALCIUM, ALBUMIN, PROT, BILITOT, ALKPHOS, ALT, AST, GLUCOSE, MG, PHOS in the last 48 hours.    All pertinent labs within the past 24 hours have been reviewed.    Significant Imaging:  I have reviewed all pertinent imaging results/findings within the past 24 hours.    Assessment/Plan:     Denice gangrene  - s/p multiple  debridements  - wound vac in place  - ID consulted for antibiotic management: He was treated with rocephin, daptomycin, clindamycin  - polymicrobial on cultures. E Coli +  - plan to go back to OR on Monday 12/27  - ID note reviewed and appreicated. From 12/21- change clindamycin to ampicillin and treat for another week    On mechanically assisted ventilation  - remained intubated post-operatively; has returned to the OR multiple times  - on cpap during my exam and doing well  - plan is for patient to go back to OR soon. Will keep intubated for now  - once no plans for surgery will get patient extubated  - If there is a delay in going to surgery can consider extubating  Do weaning trials to keep him conditioned    RAHAT (acute kidney injury)  - nephrology consulted for rapid acute rise in Cr  - thought to be related to septic ATN  - remains anuric  - Underwent HD 12/15 and 12/16, 12/17, likely HD today  - right IJ HD line   - 12/22/2021.  There is no sign of renal recovery.  Continuing with scheduled hemodialysis for this patient.    Hypertension  Stable                 Jose Urban MD  Pulmonology  [facility]

## 2021-12-25 PROBLEM — E11.9 TYPE 2 DIABETES MELLITUS WITHOUT COMPLICATION, WITHOUT LONG-TERM CURRENT USE OF INSULIN: Status: ACTIVE | Noted: 2021-01-01

## 2021-12-25 PROBLEM — D62 ACUTE BLOOD LOSS ANEMIA: Status: ACTIVE | Noted: 2021-01-01

## 2021-12-25 NOTE — ASSESSMENT & PLAN NOTE
Last night patient had a spell where he had elevated peak pressures chest x-ray was unchanged his white count is elevated now, may be still having difficulty tolerating weaning trials.  This spell happened at the end of the trial

## 2021-12-25 NOTE — SUBJECTIVE & OBJECTIVE
Interval History:  Patient awake alert    Objective:     Vital Signs (Most Recent):  Temp: 98 °F (36.7 °C) (12/25/21 0300)  Pulse: 102 (12/25/21 0500)  Resp: (!) 24 (12/25/21 0500)  BP: 118/62 (12/25/21 0500)  SpO2: 100 % (12/25/21 0500) Vital Signs (24h Range):  Temp:  [97.4 °F (36.3 °C)-99.7 °F (37.6 °C)] 98 °F (36.7 °C)  Pulse:  [] 102  Resp:  [13-36] 24  SpO2:  [81 %-100 %] 100 %  BP: ()/(50-97) 118/62     Weight: 90.5 kg (199 lb 8.3 oz)  Body mass index is 27.83 kg/m².      Intake/Output Summary (Last 24 hours) at 12/25/2021 0525  Last data filed at 12/25/2021 0504  Gross per 24 hour   Intake 3488 ml   Output 631 ml   Net 2857 ml       Physical Exam  Vitals reviewed.   Constitutional:       Appearance: Normal appearance.      Interventions: He is intubated.   HENT:      Head: Normocephalic and atraumatic.      Nose: Nose normal.      Mouth/Throat:      Mouth: Mucous membranes are dry.      Pharynx: Oropharynx is clear.   Eyes:      Extraocular Movements: Extraocular movements intact.      Conjunctiva/sclera: Conjunctivae normal.      Pupils: Pupils are equal, round, and reactive to light.   Cardiovascular:      Rate and Rhythm: Normal rate.      Heart sounds: Normal heart sounds. No murmur heard.      Pulmonary:      Effort: Pulmonary effort is normal. He is intubated.      Breath sounds: Normal breath sounds.   Abdominal:      General: Abdomen is flat. Bowel sounds are normal.      Palpations: Abdomen is soft.   Musculoskeletal:         General: Normal range of motion.      Cervical back: Normal range of motion and neck supple.      Right lower leg: No edema.      Left lower leg: No edema.   Skin:     General: Skin is warm and dry.      Capillary Refill: Capillary refill takes less than 2 seconds.   Neurological:      General: No focal deficit present.      Mental Status: He is alert and oriented to person, place, and time.   Psychiatric:         Mood and Affect: Mood normal.         Behavior:  Behavior normal.         Vents:  Vent Mode: A/C (12/25/21 0403)  Set Rate: 16 BPM (12/25/21 0403)  Vt Set: 480 mL (12/25/21 0403)  Pressure Support: 12 cmH20 (12/24/21 2206)  PEEP/CPAP: 5 cmH20 (12/25/21 0403)  Oxygen Concentration (%): 50 (12/25/21 0403)  Peak Airway Pressure: 24 cmH2O (12/25/21 0403)  Total Ve: 10.3 mL (12/25/21 0403)  F/VT Ratio<105 (RSBI): (!) 82.16 (12/24/21 1715)    Lines/Drains/Airways     Central Venous Catheter Line                 Hemodialysis Catheter 12/15/21 0745 right internal jugular 9 days          Drain                 NG/OG Tube [place] sump 18 Fr. Left nostril -- days         Urethral Catheter 12/13/21 2130 16 Fr. 11 days         Colostomy 12/18/21 1030 Descending/sigmoid LUQ 6 days          Airway                 Airway - Non-Surgical 12/14/21 1047 10 days          Peripheral Intravenous Line                 Peripheral IV - Single Lumen 12/18/21 0533 20 G Left;Posterior Forearm 6 days         Peripheral IV - Single Lumen 12/19/21 2126 20 G Anterior;Right Upper Arm 5 days                Significant Labs:    CBC/Anemia Profile:  Recent Labs   Lab 12/24/21  0737   WBC 24.28*   HGB 6.8*   HCT 20.9*      MCV 90.9   RDW 18.4*        Chemistries:  Recent Labs   Lab 12/24/21  0737   *   K 5.6*   CL 84*   CO2 17*   *   CREATININE 9.36*   CALCIUM 6.4*   ALBUMIN 1.0*   PROT 7.2   BILITOT 2.9*   ALKPHOS 155*   ALT 13*   AST 44*   MG 2.8*       All pertinent labs within the past 24 hours have been reviewed.    Significant Imaging:  I have reviewed all pertinent imaging results/findings within the past 24 hours.

## 2021-12-25 NOTE — PLAN OF CARE
Problem: Communication Impairment (Mechanical Ventilation, Invasive)  Goal: Effective Communication  Outcome: Ongoing, Progressing     Problem: Device-Related Complication Risk (Mechanical Ventilation, Invasive)  Goal: Optimal Device Function  Outcome: Ongoing, Progressing     Problem: Inability to Wean (Mechanical Ventilation, Invasive)  Goal: Mechanical Ventilation Liberation  Outcome: Ongoing, Progressing     Problem: Nutrition Impairment (Mechanical Ventilation, Invasive)  Goal: Optimal Nutrition Delivery  Outcome: Ongoing, Progressing     Problem: Skin and Tissue Injury (Mechanical Ventilation, Invasive)  Goal: Absence of Device-Related Skin and Tissue Injury  Outcome: Ongoing, Progressing     Problem: Ventilator-Induced Lung Injury (Mechanical Ventilation, Invasive)  Goal: Absence of Ventilator-Induced Lung Injury  Outcome: Ongoing, Progressing     Problem: Communication Impairment (Artificial Airway)  Goal: Effective Communication  Outcome: Ongoing, Progressing     Problem: Device-Related Complication Risk (Artificial Airway)  Goal: Optimal Device Function  Outcome: Ongoing, Progressing

## 2021-12-25 NOTE — ASSESSMENT & PLAN NOTE
Do not see any evidence of bleeding hemoglobin down 6.8 will transfuse 1 unit packed red blood cells

## 2021-12-25 NOTE — PROGRESS NOTES
[facility]  Pulmonology  Progress Note    Patient Name: Og Garcia  MRN: [id number]  Admission Date: 12/23/2021  Hospital Length of Stay: 2 days  Code Status: Full Code  Attending Provider: Cecil Abernathy DO  Primary Care Provider: Hector Arndt DNP, FNP-C   Principal Problem: <principal problem not specified>    Subjective:     Interval History:  Patient awake alert    Objective:     Vital Signs (Most Recent):  Temp: 98 °F (36.7 °C) (12/25/21 0300)  Pulse: 102 (12/25/21 0500)  Resp: (!) 24 (12/25/21 0500)  BP: 118/62 (12/25/21 0500)  SpO2: 100 % (12/25/21 0500) Vital Signs (24h Range):  Temp:  [97.4 °F (36.3 °C)-99.7 °F (37.6 °C)] 98 °F (36.7 °C)  Pulse:  [] 102  Resp:  [13-36] 24  SpO2:  [81 %-100 %] 100 %  BP: ()/(50-97) 118/62     Weight: 90.5 kg (199 lb 8.3 oz)  Body mass index is 27.83 kg/m².      Intake/Output Summary (Last 24 hours) at 12/25/2021 0525  Last data filed at 12/25/2021 0504  Gross per 24 hour   Intake 3488 ml   Output 631 ml   Net 2857 ml       Physical Exam  Vitals reviewed.   Constitutional:       Appearance: Normal appearance.      Interventions: He is intubated.   HENT:      Head: Normocephalic and atraumatic.      Nose: Nose normal.      Mouth/Throat:      Mouth: Mucous membranes are dry.      Pharynx: Oropharynx is clear.   Eyes:      Extraocular Movements: Extraocular movements intact.      Conjunctiva/sclera: Conjunctivae normal.      Pupils: Pupils are equal, round, and reactive to light.   Cardiovascular:      Rate and Rhythm: Normal rate.      Heart sounds: Normal heart sounds. No murmur heard.      Pulmonary:      Effort: Pulmonary effort is normal. He is intubated.      Breath sounds: Normal breath sounds.   Abdominal:      General: Abdomen is flat. Bowel sounds are normal.      Palpations: Abdomen is soft.   Musculoskeletal:         General: Normal range of motion.      Cervical back: Normal range of motion and neck supple.       Right lower leg: No edema.      Left lower leg: No edema.   Skin:     General: Skin is warm and dry.      Capillary Refill: Capillary refill takes less than 2 seconds.   Neurological:      General: No focal deficit present.      Mental Status: He is alert and oriented to person, place, and time.   Psychiatric:         Mood and Affect: Mood normal.         Behavior: Behavior normal.         Vents:  Vent Mode: A/C (12/25/21 0403)  Set Rate: 16 BPM (12/25/21 0403)  Vt Set: 480 mL (12/25/21 0403)  Pressure Support: 12 cmH20 (12/24/21 2206)  PEEP/CPAP: 5 cmH20 (12/25/21 0403)  Oxygen Concentration (%): 50 (12/25/21 0403)  Peak Airway Pressure: 24 cmH2O (12/25/21 0403)  Total Ve: 10.3 mL (12/25/21 0403)  F/VT Ratio<105 (RSBI): (!) 82.16 (12/24/21 1715)    Lines/Drains/Airways     Central Venous Catheter Line                 Hemodialysis Catheter 12/15/21 0745 right internal jugular 9 days          Drain                 NG/OG Tube [employer] sump 18 Fr. Left nostril -- days         Urethral Catheter 12/13/21 2130 16 Fr. 11 days         Colostomy 12/18/21 1030 Descending/sigmoid LUQ 6 days          Airway                 Airway - Non-Surgical 12/14/21 1047 10 days          Peripheral Intravenous Line                 Peripheral IV - Single Lumen 12/18/21 0533 20 G Left;Posterior Forearm 6 days         Peripheral IV - Single Lumen 12/19/21 2126 20 G Anterior;Right Upper Arm 5 days                Significant Labs:    CBC/Anemia Profile:  Recent Labs   Lab 12/24/21  0737   WBC 24.28*   HGB 6.8*   HCT 20.9*      MCV 90.9   RDW 18.4*        Chemistries:  Recent Labs   Lab 12/24/21  0737   *   K 5.6*   CL 84*   CO2 17*   *   CREATININE 9.36*   CALCIUM 6.4*   ALBUMIN 1.0*   PROT 7.2   BILITOT 2.9*   ALKPHOS 155*   ALT 13*   AST 44*   MG 2.8*       All pertinent labs within the past 24 hours have been reviewed.    Significant Imaging:  I have reviewed all pertinent imaging results/findings within the past 24  hours.    Assessment/Plan:     Denice gangrene  - s/p multiple debridements  - wound vac in place  - ID consulted for antibiotic management: He was treated with rocephin, daptomycin, clindamycin  - polymicrobial on cultures. E Coli +  - plan to go back to OR on Monday 12/27  - ID note reviewed and appreicated. From 12/21- change clindamycin to ampicillin and treat for another week    On mechanically assisted ventilation  Last night patient had a spell where he had elevated peak pressures chest x-ray was unchanged his white count is elevated now, may be still having difficulty tolerating weaning trials.  This spell happened at the end of the trial    RAHAT (acute kidney injury)  - nephrology consulted for rapid acute rise in Cr  - thought to be related to septic ATN  - remains anuric  - Underwent HD 12/15 and 12/16, 12/17, likely HD today  - right IJ HD line   - 12/22/2021.  There is no sign of renal recovery.  Continuing with scheduled hemodialysis for this patient.    Type 2 diabetes mellitus without complication, without long-term current use of insulin  Will need to adjust insulin    Acute blood loss anemia  Do not see any evidence of bleeding hemoglobin down 6.8 will transfuse 1 unit packed red blood cells                 Jose Urban MD  Pulmonology  [facility]

## 2021-12-26 NOTE — SUBJECTIVE & OBJECTIVE
Interval History:  He is on the ventilator.  He appears comfortable.    Review of patient's allergies indicates:  No Known Allergies  Current Facility-Administered Medications   Medication Frequency    0.9%  NaCl infusion (for blood administration) Q24H PRN    0.9%  NaCl infusion (for blood administration) Q24H PRN    0.9%  NaCl infusion PRN    acetaminophen tablet 500 mg Q6H PRN    ampicillin-sulbactam (UNASYN) 1.5 g in sodium chloride 0.9 % 100 mL IVPB (MB+) Q12H    dextrose 50% injection 12.5 g PRN    fentaNYL 2500 mcg in D5W 250 mL infusion premix (titrating) (conc: 10 mcg/mL) PRN    glucagon (human recombinant) injection 1 mg PRN    heparin (porcine) injection 4,000 Units PRN    heparin (porcine) injection 5,000 Units Q8H    insulin aspart U-100 injection 1-10 Units Q6H PRN    insulin detemir U-100 injection 20 Units QHS    NORepinephrine 32 mg in dextrose 5 % 250 mL infusion PRN    pantoprazole injection 40 mg Daily    propofol (DIPRIVAN) 10 mg/mL infusion PRN    sodium chloride 0.9% bolus 250 mL PRN    ticagrelor tablet 90 mg BID       Objective:     Vital Signs (Most Recent):  Temp: 98.9 °F (37.2 °C) (12/25/21 2000)  Pulse: 107 (12/25/21 2300)  Resp: (!) 24 (12/25/21 2300)  BP: 103/61 (12/25/21 2300)  SpO2: 99 % (12/25/21 2300)  O2 Device (Oxygen Therapy): ventilator (12/25/21 1916) Vital Signs (24h Range):  Temp:  [98 °F (36.7 °C)-98.9 °F (37.2 °C)] 98.9 °F (37.2 °C)  Pulse:  [] 107  Resp:  [14-34] 24  SpO2:  [97 %-100 %] 99 %  BP: ()/(37-82) 103/61     Weight: 90.5 kg (199 lb 8.3 oz) (12/25/21 0400)  Body mass index is 27.83 kg/m².  Body surface area is 2.13 meters squared.    I/O last 3 completed shifts:  In: 5124 [I.V.:523.5; Blood:712.5; NG/GT:3488; IV Piggyback:400]  Out: 2150 [Urine:100; Other:1000; Stool:1050]    Physical Exam  Constitutional:       Interventions: He is intubated.   Eyes:      Pupils: Pupils are equal, round, and reactive to light.   Cardiovascular:       Rate and Rhythm: Normal rate and regular rhythm.   Pulmonary:      Effort: He is intubated.      Breath sounds: Normal breath sounds.   Abdominal:      Tenderness: There is no guarding.   Musculoskeletal:      Cervical back: Neck supple.      Right lower leg: Edema present.      Left lower leg: Edema present.         Significant Labs:  BMP:   Recent Labs   Lab 12/24/21  0737   *   *   K 5.6*   CL 84*   CO2 17*   *   CREATININE 9.36*   CALCIUM 6.4*   MG 2.8*        Significant Imaging:

## 2021-12-26 NOTE — PROGRESS NOTES
[facility]  Nephrology  Progress Note    Patient Name: Og Garcia  MRN: [id number]  Admission Date: 12/23/2021  Hospital Length of Stay: 2 days  Attending Provider: Cecil Abernathy DO   Primary Care Physician: Hector Arndt DNP, FNP-C  Principal Problem:<principal problem not specified>    Subjective:     HPI: No notes on file    Interval History:  He is on the ventilator.  He appears comfortable.    Review of patient's allergies indicates:  No Known Allergies  Current Facility-Administered Medications   Medication Frequency    0.9%  NaCl infusion (for blood administration) Q24H PRN    0.9%  NaCl infusion (for blood administration) Q24H PRN    0.9%  NaCl infusion PRN    acetaminophen tablet 500 mg Q6H PRN    ampicillin-sulbactam (UNASYN) 1.5 g in sodium chloride 0.9 % 100 mL IVPB (MB+) Q12H    dextrose 50% injection 12.5 g PRN    fentaNYL 2500 mcg in D5W 250 mL infusion premix (titrating) (conc: 10 mcg/mL) PRN    glucagon (human recombinant) injection 1 mg PRN    heparin (porcine) injection 4,000 Units PRN    heparin (porcine) injection 5,000 Units Q8H    insulin aspart U-100 injection 1-10 Units Q6H PRN    insulin detemir U-100 injection 20 Units QHS    NORepinephrine 32 mg in dextrose 5 % 250 mL infusion PRN    pantoprazole injection 40 mg Daily    propofol (DIPRIVAN) 10 mg/mL infusion PRN    sodium chloride 0.9% bolus 250 mL PRN    ticagrelor tablet 90 mg BID       Objective:     Vital Signs (Most Recent):  Temp: 98.9 °F (37.2 °C) (12/25/21 2000)  Pulse: 107 (12/25/21 2300)  Resp: (!) 24 (12/25/21 2300)  BP: 103/61 (12/25/21 2300)  SpO2: 99 % (12/25/21 2300)  O2 Device (Oxygen Therapy): ventilator (12/25/21 1916) Vital Signs (24h Range):  Temp:  [98 °F (36.7 °C)-98.9 °F (37.2 °C)] 98.9 °F (37.2 °C)  Pulse:  [] 107  Resp:  [14-34] 24  SpO2:  [97 %-100 %] 99 %  BP: ()/(37-82) 103/61     Weight: 90.5 kg (199 lb 8.3 oz) (12/25/21 0400)  Body mass index  is 27.83 kg/m².  Body surface area is 2.13 meters squared.    I/O last 3 completed shifts:  In: 5124 [I.V.:523.5; Blood:712.5; NG/GT:3488; IV Piggyback:400]  Out: 2150 [Urine:100; Other:1000; Stool:1050]    Physical Exam  Constitutional:       Interventions: He is intubated.   Eyes:      Pupils: Pupils are equal, round, and reactive to light.   Cardiovascular:      Rate and Rhythm: Normal rate and regular rhythm.   Pulmonary:      Effort: He is intubated.      Breath sounds: Normal breath sounds.   Abdominal:      Tenderness: There is no guarding.   Musculoskeletal:      Cervical back: Neck supple.      Right lower leg: Edema present.      Left lower leg: Edema present.         Significant Labs:  BMP:   Recent Labs   Lab 12/24/21  0737   *   *   K 5.6*   CL 84*   CO2 17*   *   CREATININE 9.36*   CALCIUM 6.4*   MG 2.8*        Significant Imaging:      Assessment/Plan:     RAHAT (acute kidney injury)  He was dialyzed yesterday    On mechanically assisted ventilation  Wean as tolerated    Denice gangrene  Continue wound care        Thank you for your consult.     Huber Mcnamara MD  Nephrology  [facility]

## 2021-12-26 NOTE — PROGRESS NOTES
Patient had a bronchoscopy done yesterday for a mucous plug.  Otherwise no acute events.  OR in the morning for washout hopeful closure and then at that point he can be extubated.

## 2021-12-26 NOTE — SUBJECTIVE & OBJECTIVE
Interval History: No acute events overnight. The patient remains intubated and sedated. Currently afebrile.    Objective:     Vital Signs (Most Recent):  Temp: 98.7 °F (37.1 °C) (12/26/21 0400)  Pulse: 107 (12/26/21 0700)  Resp: (!) 29 (12/26/21 0700)  BP: (!) 153/77 (12/26/21 0700)  SpO2: 95 % (12/26/21 0700) Vital Signs (24h Range):  Temp:  [97.5 °F (36.4 °C)-98.9 °F (37.2 °C)] 98.7 °F (37.1 °C)  Pulse:  [] 107  Resp:  [14-32] 29  SpO2:  [95 %-100 %] 95 %  BP: ()/(46-77) 153/77     Weight: 89.2 kg (196 lb 10.4 oz)  Body mass index is 27.43 kg/m².      Intake/Output Summary (Last 24 hours) at 12/26/2021 1157  Last data filed at 12/26/2021 0700  Gross per 24 hour   Intake 2371.12 ml   Output 275 ml   Net 2096.12 ml       Physical Exam  Vitals reviewed.   Constitutional:       General: He is not in acute distress.     Appearance: He is ill-appearing.      Comments: Intubated and sedated   HENT:      Head: Normocephalic and atraumatic.      Comments: Right temporal deformity     Right Ear: External ear normal.      Left Ear: External ear normal.      Nose: Nose normal.      Mouth/Throat:      Mouth: Mucous membranes are moist.   Eyes:      Conjunctiva/sclera: Conjunctivae normal.      Pupils: Pupils are equal, round, and reactive to light.   Cardiovascular:      Rate and Rhythm: Normal rate and regular rhythm.      Pulses: Normal pulses.      Heart sounds: Normal heart sounds.   Pulmonary:      Effort: No respiratory distress.      Breath sounds: No stridor. No wheezing, rhonchi or rales.      Comments: Clear breath sounds anteriorly  Abdominal:      Palpations: Abdomen is soft.      Tenderness: There is no guarding.      Comments: Anterior abdominal wall with wound vac in place. Ostomy   Genitourinary:     Comments: Packing in place  Musculoskeletal:         General: No swelling or deformity.      Cervical back: Neck supple.      Right lower leg: No edema.      Left lower leg: No edema.   Lymphadenopathy:       Cervical: No cervical adenopathy.   Skin:     General: Skin is warm and dry.   Neurological:      Cranial Nerves: No cranial nerve deficit.      Comments: Currently Intubated and sedated         Vents:  Vent Mode: A/C (Attempted to Place on Cpap trial pt. [place] poorly resp up to 39 with labored resp with accessory muscle use) (12/26/21 0830)  Set Rate: 16 BPM (12/26/21 0830)  Vt Set: 480 mL (12/26/21 0830)  Pressure Support: 12 cmH20 (12/24/21 2206)  PEEP/CPAP: 5 cmH20 (12/26/21 0830)  Oxygen Concentration (%): 50 (12/26/21 0050)  Peak Airway Pressure: 21 cmH2O (12/26/21 0830)  Total Ve: 12.4 mL (12/26/21 0830)  F/VT Ratio<105 (RSBI): (!) 82.16 (12/24/21 1715)    Lines/Drains/Airways     Central Venous Catheter Line                 Hemodialysis Catheter 12/15/21 0745 right internal jugular 11 days          Drain                 NG/OG Tube [place] sump 18 Fr. Left nostril -- days         Urethral Catheter 12/13/21 2130 16 Fr. 12 days         Colostomy 12/18/21 1030 Descending/sigmoid LUQ 8 days          Airway                 Airway - Non-Surgical 12/14/21 1047 12 days          Peripheral Intravenous Line                 Peripheral IV - Single Lumen 12/18/21 0533 20 G Left;Posterior Forearm 8 days         Peripheral IV - Single Lumen 12/19/21 2126 20 G Anterior;Right Upper Arm 6 days                Significant Labs:    CBC/Anemia Profile:  No results for input(s): WBC, HGB, HCT, PLT, MCV, RDW, IRON, FERRITIN, RETIC, FOLATE, [id number], OCCULTBLOOD in the last 48 hours.     Chemistries:  No results for input(s): NA, K, CL, CO2, BUN, CREATININE, CALCIUM, ALBUMIN, PROT, BILITOT, ALKPHOS, ALT, AST, GLUCOSE, MG, PHOS in the last 48 hours.    All pertinent labs within the past 24 hours have been reviewed.    Significant Imaging:  I have reviewed all pertinent imaging results/findings within the past 24 hours.

## 2021-12-26 NOTE — HOSPITAL COURSE
12/26The patient remains intubated and sedated. Had issues with mucus plugging yesterday and had therapeutic bronch. Blood sugars are elevated.  12/27- seen on HD, on vent, sedated, wound vac in place.  12/29- wound vac changed at bedside   1/1/22-   went to OR today - per report-- Necrosis of the peritoneum with severe adhesions of the small bowel to the peritoneum; necrotizing fasciitis extending on the right abdominal musculature; serosal tear to a portion of small intestines repaired with Lembert suture; necrotic and purulent drainage to right buttock   1/3- patient to OR today for trach. Tmax of 101.3 in last 24 hours. Currently afebrile. Oxygenating adequately on 40% FiO2.  1/4- was not stable enough to go for trach yesterday. He was dialyzed. Plans are to go to OR today. Tmax of 102. He is back on pressors. Potassium is 6.2. H&H is 7.2/21.6  1/11- The patient remains critically ill. He remains on pressors. He went back to OR yesterday. He also received blood yesterday. Currently oxygenating adequately but not able to do any weaning.   1/12- patient to go back to OR today and he is also scheduled for dialysis. He remains on a low dose pressor.  1/13- Hgb is down to 5. We are transfusing 2 units. He remains on a pressor. He is currently afebrile  1/14- CBC is pending for this morning. We are going to start weaning TPN. I placed him on cpap this am during my exam and he did well. We will also look at adding some psv trials. He is due for dialysis today.  1/21- HD today. FiO2 at 40% and peep of 5  1/22- He was able to perform a 1 hour breathing trial overnight. Had some issues with low blood sugar. We are going to hold levemir  1/23- blood sugars are a little better. Did well with breathing trials.  1/24- continues to do well with breathing trials. Oxygenating adequatley  1/25- increase trials to 3 hour tid today  1/31- Trials 5 hours tid planned for today  2/1- He was not able to complete trials overnight due to  some issues with heart rate. He is oxygenating adequately today.  2/2- Did not tolerate trials due to some issues with HR and anxiety  2/3 Did not tolerate any trials yesterday. Anemic today. We will plan on transfusing  2/4- currently on HD. Hgb a little better but still < 7. We will transfuse another unit today.  2/5- having issues with secretions. Continues to have issues with weaning. Becomes tachycardic and increased respiratory rate  2/6- on a breathing trial this am and looks good. He is currently afebrile and vital signs are stable  2/7 plan for HD today. Keep current breathing trials through today  2/8- On a breathing trial this am and he looks good.  2/14- this morning he is on assist control. He is oxygenating adequately  2/15- He is having some oozing of blood. Plan to hold heparin today. Oxygenating adequately.   2/16- bleeding has improved. INR was 1.22. He is doing good with SIMV rate of 2 trials  2/17- patient back to OR today with Dr. Cazares.   2/22- patient went back to OR yesterday for skin graft  2/24- I placed patient on PSV/CPAP this am during my exam and the patient is doing good so far. Will try to use PSV/CPAP in the place of the SIMV with reduced rate  3/1- Doing well with current weaning trials. We will continue to increase as tolerated  3/4- awake and alert this am. Plan for HD this am.  3/6- plan to increase trials to 5 hours tid today  3/9- Doing well with trials. Continue to increase  3/14- Continues to do well with weaning. He is oxygenating adequately.  3/28- Currently oxygenating adequately and doing well with his weaning.  3/29- I am told that he had some issues with trials overnight. Currently resting comfortably and in no distress.  3/30- He did good with trials yesterday. Plan for HD today  3/31- Continues to do well with trials. We are going to try  continuous starting today  4/4- He had to be placed back on the ventilator this am due to increased RR and heart rate. Lungs are  clear on exam. He is currently afebrile.  4/5- looks much better after starting some PRN pain medication. Hgb is 7.8 today. He was transfused with HD yesterday  4/7- vomited ans aspirated yesterday - tmax 101.5 this morning   4/10- Tmax 103.1   4/12- Afebrile now with plans to go to surgery tomorrow.  4/14- Went to OR yesterday. Did good with trials once back from OR  4/15- Did well with trials yesterday. Hgb is 6.2. We are going to transfuse 1 unit with HD today  4/17 looks good on cpap this am. Hgb is 6.6 today. Will plan for transfusion with next HD session  4/18- increasing CPAP to 6 hours tid. Hgb is 7.0 today  4/26- He is doing ok with cpap. Oxygenating adequately  4/27- on continuous cpap since Sunday and doing well. Surgery to change trach out to XLT.  5/7- Currently resting comfortably. He is doing well with trials.  5/8- The patient had some respiratory issues overnight. It is felt that her aspirated. He spiked a temperature. Chest xray with opacification of right lung. He was placed back on the ventilator. I have reduced FiO2 to 60% from 80%  5/9-T max of 101.3. Cultures pending. He is on zosyn. Currently oxygenating adequately and looks more comfortable this am. Transfused 1 unit yesterday  5/11 Afebrile this am. He looks better this am. Oxygenating adequately. Blood cultures with ngtd  5/12- Remains afebrile. He is now back on CPAP/PSV and doing well.  5/16- Doing well on trach collar this am.   5/18- Oxygenating adequately. Doing well with trach collar trials  5/27- He is on trach collar and doing well. Tmax of 101.1 in last 24 hours.  5/28- afebrile this am. Looks good on trach collar this am  6/6- HD today. Currently resting comfortably.  Oxygenating adequately this am.  6/8- tolerating capping trach during the day.  6/10- oxygenating adequately with trach capped. He is on nasal cannula. Plan to go for debridement of foot today  6/17- HD today. On 2L nasal cannula and doing well.  6/20- trach is  capped. He continues to do well with nasal cannula.  6/23 running fever despite merrem, linezolid, and tobramycin. Continues to tolerate trach capping  6/24-CT abdomen yesterday with no findings of obstruction. He continues to run fever at times. Repeating blood cultures this am. BP is on lower side. Still oxygenating adequately on nasal cannula

## 2021-12-26 NOTE — ASSESSMENT & PLAN NOTE
Last night patient had a spell where he had elevated peak pressures chest x-ray was unchanged his white count is elevated now, may be still having difficulty tolerating weaning trials.  This spell happened at the end of the trial    12/26- had issues with mucus plug yesterday. Underwent therapeutic bronchoscopy. No issues. He is tachypneic today but oxygenating adequately. Back to OR tomorrow and then we can push to try and get extubated

## 2021-12-26 NOTE — PROGRESS NOTES
[facility]  Pulmonology  Progress Note    Patient Name: Og Garcia  MRN: [id number]  Admission Date: 12/23/2021  Hospital Length of Stay: 3 days  Code Status: Full Code  Attending Provider: Cecil Abernathy DO  Primary Care Provider: Hector Arndt DNP, FNP-C   Principal Problem: <principal problem not specified>    Subjective:     Interval History: No acute events overnight. The patient remains intubated and sedated. Currently afebrile.    Objective:     Vital Signs (Most Recent):  Temp: 98.7 °F (37.1 °C) (12/26/21 0400)  Pulse: 107 (12/26/21 0700)  Resp: (!) 29 (12/26/21 0700)  BP: (!) 153/77 (12/26/21 0700)  SpO2: 95 % (12/26/21 0700) Vital Signs (24h Range):  Temp:  [97.5 °F (36.4 °C)-98.9 °F (37.2 °C)] 98.7 °F (37.1 °C)  Pulse:  [] 107  Resp:  [14-32] 29  SpO2:  [95 %-100 %] 95 %  BP: ()/(46-77) 153/77     Weight: 89.2 kg (196 lb 10.4 oz)  Body mass index is 27.43 kg/m².      Intake/Output Summary (Last 24 hours) at 12/26/2021 1157  Last data filed at 12/26/2021 0700  Gross per 24 hour   Intake 2371.12 ml   Output 275 ml   Net 2096.12 ml       Physical Exam  Vitals reviewed.   Constitutional:       General: He is not in acute distress.     Appearance: He is ill-appearing.      Comments: Intubated and sedated   HENT:      Head: Normocephalic and atraumatic.      Comments: Right temporal deformity     Right Ear: External ear normal.      Left Ear: External ear normal.      Nose: Nose normal.      Mouth/Throat:      Mouth: Mucous membranes are moist.   Eyes:      Conjunctiva/sclera: Conjunctivae normal.      Pupils: Pupils are equal, round, and reactive to light.   Cardiovascular:      Rate and Rhythm: Normal rate and regular rhythm.      Pulses: Normal pulses.      Heart sounds: Normal heart sounds.   Pulmonary:      Effort: No respiratory distress.      Breath sounds: No stridor. No wheezing, rhonchi or rales.      Comments: Clear breath sounds anteriorly  Abdominal:       Palpations: Abdomen is soft.      Tenderness: There is no guarding.      Comments: Anterior abdominal wall with wound vac in place. Ostomy   Genitourinary:     Comments: Packing in place  Musculoskeletal:         General: No swelling or deformity.      Cervical back: Neck supple.      Right lower leg: No edema.      Left lower leg: No edema.   Lymphadenopathy:      Cervical: No cervical adenopathy.   Skin:     General: Skin is warm and dry.   Neurological:      Cranial Nerves: No cranial nerve deficit.      Comments: Currently Intubated and sedated         Vents:  Vent Mode: A/C (Attempted to Place on Cpap trial pt. [place] poorly resp up to 39 with labored resp with accessory muscle use) (12/26/21 0830)  Set Rate: 16 BPM (12/26/21 0830)  Vt Set: 480 mL (12/26/21 0830)  Pressure Support: 12 cmH20 (12/24/21 2206)  PEEP/CPAP: 5 cmH20 (12/26/21 0830)  Oxygen Concentration (%): 50 (12/26/21 0050)  Peak Airway Pressure: 21 cmH2O (12/26/21 0830)  Total Ve: 12.4 mL (12/26/21 0830)  F/VT Ratio<105 (RSBI): (!) 82.16 (12/24/21 1715)    Lines/Drains/Airways     Central Venous Catheter Line                 Hemodialysis Catheter 12/15/21 0745 right internal jugular 11 days          Drain                 NG/OG Tube [place] sump 18 Fr. Left nostril -- days         Urethral Catheter 12/13/21 2130 16 Fr. 12 days         Colostomy 12/18/21 1030 Descending/sigmoid LUQ 8 days          Airway                 Airway - Non-Surgical 12/14/21 1047 12 days          Peripheral Intravenous Line                 Peripheral IV - Single Lumen 12/18/21 0533 20 G Left;Posterior Forearm 8 days         Peripheral IV - Single Lumen 12/19/21 2126 20 G Anterior;Right Upper Arm 6 days                Significant Labs:    CBC/Anemia Profile:  No results for input(s): WBC, HGB, HCT, PLT, MCV, RDW, IRON, FERRITIN, RETIC, FOLATE, [id number], OCCULTBLOOD in the last 48 hours.     Chemistries:  No results for input(s): NA, K, CL, CO2, BUN, CREATININE, CALCIUM,  ALBUMIN, PROT, BILITOT, ALKPHOS, ALT, AST, GLUCOSE, MG, PHOS in the last 48 hours.    All pertinent labs within the past 24 hours have been reviewed.    Significant Imaging:  I have reviewed all pertinent imaging results/findings within the past 24 hours.    Assessment/Plan:     Type 2 diabetes mellitus without complication, without long-term current use of insulin  accuchecks remain elevated. I have increased levemir to 30 units 12/26    Acute blood loss anemia  Do not see any evidence of bleeding hemoglobin down 6.8 will transfuse 1 unit packed red blood cells    Transfused 1 unit of prbc's yesterday. Repeat H&H is pending    RAHAT (acute kidney injury)  - nephrology consulted for rapid acute rise in Cr  - thought to be related to septic ATN  - remains anuric  - Underwent HD 12/15 and 12/16, 12/17, likely HD today  - right IJ HD line   - 12/22/2021.  There is no sign of renal recovery.  Continuing with scheduled hemodialysis for this patient.    On mechanically assisted ventilation  Last night patient had a spell where he had elevated peak pressures chest x-ray was unchanged his white count is elevated now, may be still having difficulty tolerating weaning trials.  This spell happened at the end of the trial    12/26- had issues with mucus plug yesterday. Underwent therapeutic bronchoscopy. No issues. He is tachypneic today but oxygenating adequately. Back to OR tomorrow and then we can push to try and get extubated    Denice gangrene  - s/p multiple debridements  - wound vac in place  - ID consulted for antibiotic management: He was treated with rocephin, daptomycin, clindamycin  - polymicrobial on cultures. E Coli +  - plan to go back to OR on Monday 12/27  - ID note reviewed and appreicated. From 12/21- change clindamycin to ampicillin and treat for another week    12/26- plan to go back to OR tomorrow    Hypertension  Stable- continue with current care      The patient remains critically ill. This note  includes approximately 30 minutes of critical care time spent in the management of this patient. This includes review of labs, data, imaging, and vent management.             Cecil Abernathy, DO  Pulmonology  [facility]

## 2021-12-26 NOTE — PLAN OF CARE
Plans of care ongoing  Problem: Adult Inpatient Plan of Care  Goal: Plan of Care Review  Outcome: Ongoing, Progressing  Goal: Patient-Specific Goal (Individualized)  Outcome: Ongoing, Progressing  Goal: Absence of Hospital-Acquired Illness or Injury  Outcome: Ongoing, Progressing  Goal: Optimal Comfort and Wellbeing  Outcome: Ongoing, Progressing  Goal: Readiness for Transition of Care  Outcome: Ongoing, Progressing     Problem: Adjustment to Illness (Sepsis/Septic Shock)  Goal: Optimal Coping  Outcome: Ongoing, Progressing     Problem: Bleeding (Sepsis/Septic Shock)  Goal: Absence of Bleeding  Outcome: Ongoing, Progressing     Problem: Glycemic Control Impaired (Sepsis/Septic Shock)  Goal: Blood Glucose Level Within Desired Range  Outcome: Ongoing, Progressing     Problem: Infection Progression (Sepsis/Septic Shock)  Goal: Absence of Infection Signs and Symptoms  Outcome: Ongoing, Progressing     Problem: Nutrition Impaired (Sepsis/Septic Shock)  Goal: Optimal Nutrition Intake  Outcome: Ongoing, Progressing     Problem: Fluid and Electrolyte Imbalance (Acute Kidney Injury/Impairment)  Goal: Fluid and Electrolyte Balance  Outcome: Ongoing, Progressing     Problem: Oral Intake Inadequate (Acute Kidney Injury/Impairment)  Goal: Optimal Nutrition Intake  Outcome: Ongoing, Progressing     Problem: Renal Function Impairment (Acute Kidney Injury/Impairment)  Goal: Effective Renal Function  Outcome: Ongoing, Progressing     Problem: Infection  Goal: Absence of Infection Signs and Symptoms  Outcome: Ongoing, Progressing     Problem: Fall Injury Risk  Goal: Absence of Fall and Fall-Related Injury  Outcome: Ongoing, Progressing     Problem: Communication Impairment (Mechanical Ventilation, Invasive)  Goal: Effective Communication  Outcome: Ongoing, Progressing     Problem: Device-Related Complication Risk (Mechanical Ventilation, Invasive)  Goal: Optimal Device Function  Outcome: Ongoing, Progressing     Problem: Inability to  Wean (Mechanical Ventilation, Invasive)  Goal: Mechanical Ventilation Liberation  Outcome: Ongoing, Progressing     Problem: Nutrition Impairment (Mechanical Ventilation, Invasive)  Goal: Optimal Nutrition Delivery  Outcome: Ongoing, Progressing     Problem: Skin and Tissue Injury (Mechanical Ventilation, Invasive)  Goal: Absence of Device-Related Skin and Tissue Injury  Outcome: Ongoing, Progressing     Problem: Ventilator-Induced Lung Injury (Mechanical Ventilation, Invasive)  Goal: Absence of Ventilator-Induced Lung Injury  Outcome: Ongoing, Progressing     Problem: Communication Impairment (Artificial Airway)  Goal: Effective Communication  Outcome: Ongoing, Progressing     Problem: Device-Related Complication Risk (Artificial Airway)  Goal: Optimal Device Function  Outcome: Ongoing, Progressing     Problem: Skin and Tissue Injury (Artificial Airway)  Goal: Absence of Device-Related Skin or Tissue Injury  Outcome: Ongoing, Progressing     Problem: Noninvasive Ventilation Acute  Goal: Effective Unassisted Ventilation and Oxygenation  Outcome: Ongoing, Progressing     Problem: Skin Injury Risk Increased  Goal: Skin Health and Integrity  Outcome: Ongoing, Progressing     Problem: Device-Related Complication Risk (Hemodialysis)  Goal: Safe, Effective Therapy Delivery  Outcome: Ongoing, Progressing     Problem: Hemodynamic Instability (Hemodialysis)  Goal: Effective Tissue Perfusion  Outcome: Ongoing, Progressing     Problem: Infection (Hemodialysis)  Goal: Absence of Infection Signs and Symptoms  Outcome: Ongoing, Progressing     Problem: Diabetes Comorbidity  Goal: Blood Glucose Level Within Targeted Range  Outcome: Ongoing, Progressing

## 2021-12-26 NOTE — SUBJECTIVE & OBJECTIVE
Interval History:  He remains on the ventilator.    Review of patient's allergies indicates:  No Known Allergies  Current Facility-Administered Medications   Medication Frequency    0.9%  NaCl infusion (for blood administration) Q24H PRN    0.9%  NaCl infusion (for blood administration) Q24H PRN    0.9%  NaCl infusion PRN    acetaminophen tablet 500 mg Q6H PRN    ampicillin-sulbactam (UNASYN) 1.5 g in sodium chloride 0.9 % 100 mL IVPB (MB+) Q12H    dextrose 50% injection 12.5 g PRN    fentaNYL 2500 mcg in D5W 250 mL infusion premix (titrating) (conc: 10 mcg/mL) PRN    glucagon (human recombinant) injection 1 mg PRN    heparin (porcine) injection 4,000 Units PRN    heparin (porcine) injection 5,000 Units Q8H    insulin aspart U-100 injection 1-10 Units Q6H PRN    insulin detemir U-100 injection 30 Units QHS    NORepinephrine 32 mg in dextrose 5 % 250 mL infusion PRN    pantoprazole injection 40 mg Daily    propofol (DIPRIVAN) 10 mg/mL infusion PRN    sodium chloride 0.9% bolus 250 mL PRN    ticagrelor tablet 90 mg BID       Objective:     Vital Signs (Most Recent):  Temp: 98.3 °F (36.8 °C) (12/26/21 1515)  Pulse: 102 (12/26/21 1545)  Resp: (!) 21 (12/26/21 1545)  BP: (!) 116/54 (12/26/21 1545)  SpO2: 99 % (12/26/21 1545)  O2 Device (Oxygen Therapy): ventilator (12/26/21 1500) Vital Signs (24h Range):  Temp:  [97.5 °F (36.4 °C)-98.9 °F (37.2 °C)] 98.3 °F (36.8 °C)  Pulse:  [] 102  Resp:  [11-42] 21  SpO2:  [82 %-100 %] 99 %  BP: (101-153)/(49-91) 116/54     Weight: 89.2 kg (196 lb 10.4 oz) (12/26/21 0600)  Body mass index is 27.43 kg/m².  Body surface area is 2.11 meters squared.    I/O last 3 completed shifts:  In: 4531 [I.V.:848.5; Blood:712.5; Other:50; NG/GT:2520; IV Piggyback:400]  Out: 2145 [Urine:45; Other:1000; Stool:1100]    Physical Exam  Constitutional:       Interventions: He is intubated.   Eyes:      Pupils: Pupils are equal, round, and reactive to light.   Cardiovascular:       Rate and Rhythm: Normal rate and regular rhythm.   Pulmonary:      Effort: He is intubated.      Breath sounds: Normal breath sounds.   Abdominal:      Tenderness: There is no guarding.   Musculoskeletal:      Cervical back: Neck supple.      Right lower leg: Edema present.      Left lower leg: Edema present.         Significant Labs:  BMP:   Recent Labs   Lab 12/24/21  0737   *   *   K 5.6*   CL 84*   CO2 17*   *   CREATININE 9.36*   CALCIUM 6.4*   MG 2.8*     CBC:   Recent Labs   Lab 12/24/21  0737 12/24/21  0737 12/26/21  1225   WBC 24.28*  --   --    RBC 2.30*  --   --    HGB 6.8*   < > 6.7*   HCT 20.9*   < > 19.8*     --   --    MCV 90.9  --   --    MCH 29.6  --   --    MCHC 32.5  --   --     < > = values in this interval not displayed.        Significant Imaging:

## 2021-12-26 NOTE — ASSESSMENT & PLAN NOTE
Do not see any evidence of bleeding hemoglobin down 6.8 will transfuse 1 unit packed red blood cells    Transfused 1 unit of prbc's yesterday. Repeat H&H is pending

## 2021-12-26 NOTE — ASSESSMENT & PLAN NOTE
- s/p multiple debridements  - wound vac in place  - ID consulted for antibiotic management: He was treated with rocephin, daptomycin, clindamycin  - polymicrobial on cultures. E Coli +  - plan to go back to OR on Monday 12/27  - ID note reviewed and appreicated. From 12/21- change clindamycin to ampicillin and treat for another week    12/26- plan to go back to OR tomorrow

## 2021-12-26 NOTE — PROGRESS NOTES
[facility]  Nephrology  Progress Note    Patient Name: Og Garcia  MRN: [id number]  Admission Date: 12/23/2021  Hospital Length of Stay: 3 days  Attending Provider: Cecil Abernathy DO   Primary Care Physician: Hector Arndt DNP, FNP-C  Principal Problem:<principal problem not specified>    Subjective:     HPI: No notes on file    Interval History:  He remains on the ventilator.    Review of patient's allergies indicates:  No Known Allergies  Current Facility-Administered Medications   Medication Frequency    0.9%  NaCl infusion (for blood administration) Q24H PRN    0.9%  NaCl infusion (for blood administration) Q24H PRN    0.9%  NaCl infusion PRN    acetaminophen tablet 500 mg Q6H PRN    ampicillin-sulbactam (UNASYN) 1.5 g in sodium chloride 0.9 % 100 mL IVPB (MB+) Q12H    dextrose 50% injection 12.5 g PRN    fentaNYL 2500 mcg in D5W 250 mL infusion premix (titrating) (conc: 10 mcg/mL) PRN    glucagon (human recombinant) injection 1 mg PRN    heparin (porcine) injection 4,000 Units PRN    heparin (porcine) injection 5,000 Units Q8H    insulin aspart U-100 injection 1-10 Units Q6H PRN    insulin detemir U-100 injection 30 Units QHS    NORepinephrine 32 mg in dextrose 5 % 250 mL infusion PRN    pantoprazole injection 40 mg Daily    propofol (DIPRIVAN) 10 mg/mL infusion PRN    sodium chloride 0.9% bolus 250 mL PRN    ticagrelor tablet 90 mg BID       Objective:     Vital Signs (Most Recent):  Temp: 98.3 °F (36.8 °C) (12/26/21 1515)  Pulse: 102 (12/26/21 1545)  Resp: (!) 21 (12/26/21 1545)  BP: (!) 116/54 (12/26/21 1545)  SpO2: 99 % (12/26/21 1545)  O2 Device (Oxygen Therapy): ventilator (12/26/21 1500) Vital Signs (24h Range):  Temp:  [97.5 °F (36.4 °C)-98.9 °F (37.2 °C)] 98.3 °F (36.8 °C)  Pulse:  [] 102  Resp:  [11-42] 21  SpO2:  [82 %-100 %] 99 %  BP: (101-153)/(49-91) 116/54     Weight: 89.2 kg (196 lb 10.4 oz) (12/26/21 0600)  Body mass index is 27.43  kg/m².  Body surface area is 2.11 meters squared.    I/O last 3 completed shifts:  In: 4531 [I.V.:848.5; Blood:712.5; Other:50; NG/GT:2520; IV Piggyback:400]  Out: 2145 [Urine:45; Other:1000; Stool:1100]    Physical Exam  Constitutional:       Interventions: He is intubated.   Eyes:      Pupils: Pupils are equal, round, and reactive to light.   Cardiovascular:      Rate and Rhythm: Normal rate and regular rhythm.   Pulmonary:      Effort: He is intubated.      Breath sounds: Normal breath sounds.   Abdominal:      Tenderness: There is no guarding.   Musculoskeletal:      Cervical back: Neck supple.      Right lower leg: Edema present.      Left lower leg: Edema present.         Significant Labs:  BMP:   Recent Labs   Lab 12/24/21  0737   *   *   K 5.6*   CL 84*   CO2 17*   *   CREATININE 9.36*   CALCIUM 6.4*   MG 2.8*     CBC:   Recent Labs   Lab 12/24/21  0737 12/24/21  0737 12/26/21  1225   WBC 24.28*  --   --    RBC 2.30*  --   --    HGB 6.8*   < > 6.7*   HCT 20.9*   < > 19.8*     --   --    MCV 90.9  --   --    MCH 29.6  --   --    MCHC 32.5  --   --     < > = values in this interval not displayed.        Significant Imaging:      Assessment/Plan:     RAHAT (acute kidney injury)  Dialysis Monday    On mechanically assisted ventilation  Wean as tolerated    Denice gangrene  Continue wound care        Thank you for your consult.     Huber Mcnamara MD  Nephrology  [facility]

## 2021-12-27 NOTE — PLAN OF CARE
[facility]  Initial Discharge Assessment       Primary Care Provider: Hector Arndt DNP, FNP-C    Admission Diagnosis: Necrotizing fasciitis [M72.6]    Admission Date: 12/23/2021  Expected Discharge Date:     Discharge Barriers Identified: None    Payor: MEDICARE / Plan: MEDICARE PART A & B / Product Type: Government /     Extended Emergency Contact Information  Primary Emergency Contact: [facility] Phone: [phone]  Mobile Phone: [phone]  Relation: Relative  Preferred language: English   needed? No  Secondary Emergency Contact: wilfredo milian  Work Phone: [phone]  Mobile Phone: [phone]  Relation: Daughter    Discharge Plan A: Home with family  Discharge Plan B: Home with family      [facility] [place] [address]  [place] [address]  Phone: [phone] Fax: [phone]    [facility] [place] [address] [address]  Phone: [phone] Fax: [phone]      Initial Assessment (most recent)     Adult Discharge Assessment - 12/27/21 1133        Discharge Assessment    Assessment Type Discharge Planning Assessment     Source of Information family     Communicated DOROTEO with patient/caregiver Date not available/Unable to determine     Lives With alone   niece lives next door    Do you expect to return to your current living situation? Yes     Do you have help at home or someone to help you manage your care at home? Yes     Walking or Climbing Stairs Difficulty none     Dressing/Bathing Difficulty none     Equipment Currently Used at Home none     Do you currently have service(s) that help you manage your care at home? No     Discharge Plan A Home with family     Discharge Plan B Home with family     DME Needed Upon Discharge  none     Discharge Plan discussed with: Sibling     Discharge Barriers Identified None                 SS spoke  with pt's niece. Pt lives at home alone, niece lives next door. Not on home health. No dme. Discharge plan is to return home with no aniticiapted needs. Informed of IDT meeting and will attend if able.

## 2021-12-27 NOTE — ASSESSMENT & PLAN NOTE
- s/p multiple debridements  - wound vac in place  - ID consulted for antibiotic management: He was treated with rocephin, daptomycin, clindamycin  - polymicrobial on cultures. E Coli +  - plan to go back to OR on Monday 12/27  - ID note reviewed and appreicated. From 12/21- change clindamycin to ampicillin and treat for another week    12/27- plan to go back to OR today or tomorrow

## 2021-12-27 NOTE — ASSESSMENT & PLAN NOTE
12/14- intubated   12/26- had issues with mucus plug yesterday. Underwent therapeutic bronchoscopy. No issues. He is tachypneic today but oxygenating adequately. Back to OR tomorrow and then we can push to try and get extubated  12/27- on vent, increase RR - settings AC RR 15, , peep 5, 50% - was scheduled to go to OR today; unsure if this has been canceled or not

## 2021-12-27 NOTE — PLAN OF CARE
Problem: Adult Inpatient Plan of Care  Goal: Absence of Hospital-Acquired Illness or Injury  Outcome: Ongoing, Progressing  Goal: Optimal Comfort and Wellbeing  Outcome: Ongoing, Progressing     Problem: Fluid and Electrolyte Imbalance (Acute Kidney Injury/Impairment)  Goal: Fluid and Electrolyte Balance  Outcome: Ongoing, Progressing

## 2021-12-27 NOTE — SUBJECTIVE & OBJECTIVE
Interval History: intubated, on sedation, on HD, increased resp rate, wound vac in place     Objective:     Vital Signs (Most Recent):  Temp: 98.3 °F (36.8 °C) (12/27/21 0400)  Pulse: 100 (12/27/21 1125)  Resp: (!) 30 (12/27/21 1125)  BP: (!) 106/55 (12/27/21 1125)  SpO2: 100 % (12/27/21 1125) Vital Signs (24h Range):  Temp:  [98.3 °F (36.8 °C)-99.4 °F (37.4 °C)] 98.3 °F (36.8 °C)  Pulse:  [] 100  Resp:  [15-39] 30  SpO2:  [96 %-100 %] 100 %  BP: ()/(44-91) 106/55     Weight: 89.5 kg (197 lb 5 oz)  Body mass index is 27.52 kg/m².      Intake/Output Summary (Last 24 hours) at 12/27/2021 1400  Last data filed at 12/27/2021 1115  Gross per 24 hour   Intake 2462.09 ml   Output 1830 ml   Net 632.09 ml       Physical Exam  Vitals reviewed.   Constitutional:       General: He is not in acute distress.     Appearance: He is ill-appearing.      Interventions: He is sedated and intubated.      Comments: Intubated and sedated   HENT:      Head: Normocephalic and atraumatic.      Comments: Right temporal deformity     Right Ear: External ear normal.      Left Ear: External ear normal.      Nose: Nose normal.      Mouth/Throat:      Mouth: Mucous membranes are moist.   Eyes:      Conjunctiva/sclera: Conjunctivae normal.      Pupils: Pupils are equal, round, and reactive to light.   Cardiovascular:      Rate and Rhythm: Normal rate and regular rhythm.      Pulses: Normal pulses.      Heart sounds: Normal heart sounds.   Pulmonary:      Effort: Pulmonary effort is normal. Tachypnea present. No respiratory distress. He is intubated.      Breath sounds: No stridor. Rhonchi present. No wheezing or rales.   Abdominal:      Palpations: Abdomen is soft.      Tenderness: There is no guarding.      Comments: Anterior abdominal wall with wound vac in place. Ostomy   Musculoskeletal:         General: No swelling or deformity.      Cervical back: Neck supple.      Right lower leg: No edema.      Left lower leg: No edema.    Lymphadenopathy:      Cervical: No cervical adenopathy.   Skin:     General: Skin is warm and dry.   Neurological:      Cranial Nerves: No cranial nerve deficit.      Comments: Currently Intubated and sedated         Vents:  Vent Mode: A/C (12/27/21 0345)  Set Rate: 16 BPM (12/27/21 0345)  Vt Set: 480 mL (12/27/21 0345)  Pressure Support: 12 cmH20 (12/27/21 0010)  PEEP/CPAP: 5 cmH20 (12/27/21 0345)  Oxygen Concentration (%): 50 (12/27/21 0400)  Peak Airway Pressure: 22 cmH2O (12/27/21 0345)  Total Ve: 14 mL (12/27/21 0345)  F/VT Ratio<105 (RSBI): (!) 79.02 (12/26/21 2000)    Lines/Drains/Airways     Central Venous Catheter Line                 Hemodialysis Catheter 12/15/21 0745 right internal jugular 12 days          Drain                 NG/OG Tube [place] sump 18 Fr. Left nostril -- days         Urethral Catheter 12/13/21 2130 16 Fr. 13 days         Colostomy 12/18/21 1030 Descending/sigmoid LUQ 9 days          Airway                 Airway - Non-Surgical 12/14/21 1047 13 days          Peripheral Intravenous Line                 Peripheral IV - Single Lumen 12/19/21 2126 20 G Anterior;Right Upper Arm 7 days         Peripheral IV - Single Lumen 12/26/21 2243 22 G Left;Posterior Hand <1 day                Significant Labs:    CBC/Anemia Profile:  Recent Labs   Lab 12/26/21  1225   HGB 6.7*   HCT 19.8*        Chemistries:  Recent Labs   Lab 12/27/21  0814   *   K 7.3*   CL 84*   CO2 12*   *   CREATININE 11.01*   CALCIUM 7.3*       All pertinent labs within the past 24 hours have been reviewed.    Significant Imaging:  I have reviewed all pertinent imaging results/findings within the past 24 hours.

## 2021-12-27 NOTE — ASSESSMENT & PLAN NOTE
- nephrology consulted for rapid acute rise in Cr  - thought to be related to septic ATN  - right IJ HD line   - 12/22/2021.  There is no sign of renal recovery.  Continuing with scheduled hemodialysis for this patient.  12/27 seen on HD - anemia with H/H 6.7/19.8-- I am told we have a shortage of blood - transfusion has been ordered but unsure when this will occur

## 2021-12-27 NOTE — PROGRESS NOTES
[facility]  Adult Nutrition  Consult Note         Reason for Assessment  Reason For Assessment: consult  Nutrition Risk Screen: tube feeding or parenteral nutrition  Malnutrition  Is Patient Malnourished: No  Nutrition Diagnosis  Increased protein Needs   related to Decreased/ impaired skin integrity as evidenced by wounds    Nutrition Diagnosis Status: Chronic/ continues      Nutrition Risk  Level of Risk/Frequency of Follow-up: high   Chewing or Swallowing Difficulty?: Swallowing difficulty  Estimated/Assessed Needs  RMR (Radom-St. Jeor Equation): 1697.13 Activity Factor: 1 Injury Factor: 1.2   Total Ve: 14 mL Temp: 98.3 °F (36.8 °C)Axillary  Weight Used For Calorie Calculations: 89.5 kg (197 lb 5 oz)        Weight Used For Protein Calculations: 81 kg (178 lb 9.2 oz)  Protein Requirements:   Estimated Fluid Requirement Method: RDA Method Fluid Requirements (mL): 1808        Nutrition Prescription / Recommendations  Recommendation/Intervention: continueTube feeding: Vital AF @ 65ml/hr; H20 flush of 60ml q 2hr  Goals: Tube feeding tolerance, wt maintainence, wound healing, pt will meet estimated nutritional needs  Nutrition Goal Status: progressing towards goal  Communication of RD Recs: reviewed with physician  Current Diet Order: Tube feeding: Vital AF @ 65ml/hr; H20 flush of 60ml q 6r  Current Nutrition Support Formula Ordered: Vital AF 1.2  Current Nutrition Support Rate Ordered: 65 (ml)  Current Nutrition Support Frequency Ordered: hourly  Recommended Diet: Enteral Nutrition Tube feeding: Vital AF @ 65ml/hr; H20 flush of 60ml q 2hr  Recommended Oral Supplement: No Oral Supplements  Is Nutrition Support Recommended: No  Is Education Recommended: No  Monitor and Evaluation  % current Intake: Enteral Nutrition at goal  % intake to meet estimated needs: Enteral Nutrition   Food and Nutrient Intake: enteral nutrition intake  Food and Nutrient Adminstration: enteral and parenteral  "nutrition administration  Anthropometric Measurements: height/length,body mass index,weight,weight change  Biochemical Data, Medical Tests and Procedures: electrolyte and renal panel,glucose/endocrine profile  Nutrition-Focused Physical Findings: skin  Enteral Calories (kcal): 1872  Enteral Protein (gm): 117  Enteral (Free Water) Fluid (mL): 1264  Free Water Flush Fluid (mL): 240  Total Fluid Intake (mL): 1504  Energy Calories Required: meeting needs  Protein Required: meeting needs  Fluid Required: meeting needs  Tolerance: tolerating  Current Medical Diagnosis and Past Medical History     Past Medical History:   Diagnosis Date    Hyperlipidemia     Hypertension      Nutrition/Diet History  Food Allergies: NKFA  Lab/Procedures/Meds  Recent Labs   Lab 12/27/21  0814   *   K 7.3*   *   CREATININE 11.01*   *   CALCIUM 7.3*   CL 84*     Last A1c: No results found for: HGBA1C  Lab Results   Component Value Date    RBC 2.30 (L) 12/24/2021    HGB 6.7 (L) 12/26/2021    HCT 19.8 (L) 12/26/2021    MCV 90.9 12/24/2021    MCH 29.6 12/24/2021    MCHC 32.5 12/24/2021     Pertinent Labs Reviewed: reviewed  Pertinent Labs Comments: Na 122, K 5.6, , Cr 9.36, Glu 328, Alb 1.0, Phos 15.3, MG 2.8  Pertinent Medications Reviewed: reviewed  Pertinent Medications Comments: heparin, insulin  Anthropometrics  Temp: 98.3 °F (36.8 °C)  Height Method: Stated  Height: 5' 11" (180.3 cm)  Height (inches): 71 in  Weight Method: Bed Scale  Weight: 89.5 kg (197 lb 5 oz)  Weight (lb): 197.31 lb  Ideal Body Weight (IBW), Male: 172 lb  % Ideal Body Weight, Male (lb): 114.72 %  BMI (Calculated): 27.5  BMI Grade: 25 - 29.9 - overweight     Nutrition by Nursing  Diet/Nutrition Received: NPO     Diet/Feeding Assistance: total feed  Diet/Feeding Tolerance:  (tf)  Last Bowel Movement: 12/26/21       NG/OG Tube [place] sump 18 Fr. Left nostril-Feeding Type: continuous,by pump       NG/OG Tube [place] sump 18 Fr. Left nostril-Current " Rate (mL/hr): 65 mL/hr       NG/OG Tube [place] sump 18 Fr. Left nostril-Goal Rate (mL/hr): 65 mL/hr       NG/OG Tube [place] sump 18 Fr. Left nostril-Formula Name: vatal af  Nutrition Follow-Up  RD Follow-up?: Yes  Assessment and Plan  No new Assessment & Plan notes have been filed under this hospital service since the last note was generated.  Service: Nutrition

## 2021-12-27 NOTE — PROGRESS NOTES
Pharmacist Intervention IV to PO Note    Og Garcia is a 66 y.o. male being treated with IV medication pantoprazole    Patient Data:    Vital Signs (Most Recent):  Temp: 98.3 °F (36.8 °C) (12/27/21 0400)  Pulse: 100 (12/27/21 1125)  Resp: (!) 30 (12/27/21 1125)  BP: (!) 106/55 (12/27/21 1125)  SpO2: 100 % (12/27/21 1125)   Vital Signs (72h Range):  Temp:  [97.5 °F (36.4 °C)-99.7 °F (37.6 °C)]   Pulse:  []   Resp:  [11-42]   BP: ()/(37-97)   SpO2:  [81 %-100 %]      CBC:  Recent Labs   Lab 12/21/21 0723 12/21/21  0723 12/22/21  0439 12/24/21  0737 12/26/21  1225   WBC 19.97*  --  21.80* 24.28*  --    RBC 2.54*  --  2.71* 2.30*  --    HGB 7.5*   < > 8.0* 6.8* 6.7*   HCT 22.3*   < > 23.0* 20.9* 19.8*     --  310 359  --    MCV 87.8  --  84.9 90.9  --    MCH 29.5  --  29.5 29.6  --    MCHC 33.6  --  34.8 32.5  --     < > = values in this interval not displayed.     CMP:     Recent Labs   Lab 12/22/21  0439 12/22/21  0439 12/24/21  0737 12/24/21  0737 12/27/21  0814   *  --  328*  --  327*   CALCIUM 7.3*   < > 6.4*   < > 7.3*   ALBUMIN 1.1*  --  1.0*  --   --    PROT 7.3  --  7.2  --   --    *  --  122*  --  122*   K 6.2*  --  5.6*  --  7.3*   CO2 15*  --  17*  --  12*   CL 90*  --  84*  --  84*   *  --  117*  --  177*   CREATININE 9.63*  --  9.36*  --  11.01*   ALKPHOS 159*  --  155*  --   --    ALT 12*  --  13*  --   --    AST 52*  --  44*  --   --    BILITOT 2.8*  --  2.9*  --   --     < > = values in this interval not displayed.       Dietary Orders:  Diet Orders  Report           Tube Feedings/Formulas [facility]; Vital AF 1.2; 65; Continuous; Nasogastric/ [place] sump; Ready to Hang Bottle/Bag; 60 ml; Every 6 hours: Tube Feeding (I) starting at 12/27 1107    Diet NPO: NPO starting at 12/27 0001            Based on the following criteria, this patient qualifies for intravenous to oral conversion:  [x] The patients gastrointestinal tract is functioning (tolerating medications  via oral or enteral route for 24 hours and tolerating food or enteral feeds for 24 hours).    IV medication pantoprazole will be changed to oral medication pantoprazole    Pharmacist's Name: Kavitha Golden  Pharmacist's Extension: [phone]

## 2021-12-27 NOTE — PROGRESS NOTES
[facility]  Pulmonology  Progress Note    Patient Name: Og Garcia  MRN: [id number]  Admission Date: 12/23/2021  Hospital Length of Stay: 4 days  Code Status: Full Code  Attending Provider: Cecil Abernathy DO  Primary Care Provider: Hector Arndt DNP, FNP-C   Principal Problem: Denice gangrene    Subjective:     Interval History: intubated, on sedation, on HD, increased resp rate, wound vac in place     Objective:     Vital Signs (Most Recent):  Temp: 98.3 °F (36.8 °C) (12/27/21 0400)  Pulse: 100 (12/27/21 1125)  Resp: (!) 30 (12/27/21 1125)  BP: (!) 106/55 (12/27/21 1125)  SpO2: 100 % (12/27/21 1125) Vital Signs (24h Range):  Temp:  [98.3 °F (36.8 °C)-99.4 °F (37.4 °C)] 98.3 °F (36.8 °C)  Pulse:  [] 100  Resp:  [15-39] 30  SpO2:  [96 %-100 %] 100 %  BP: ()/(44-91) 106/55     Weight: 89.5 kg (197 lb 5 oz)  Body mass index is 27.52 kg/m².      Intake/Output Summary (Last 24 hours) at 12/27/2021 1400  Last data filed at 12/27/2021 1115  Gross per 24 hour   Intake 2462.09 ml   Output 1830 ml   Net 632.09 ml       Physical Exam  Vitals reviewed.   Constitutional:       General: He is not in acute distress.     Appearance: He is ill-appearing.      Interventions: He is sedated and intubated.      Comments: Intubated and sedated   HENT:      Head: Normocephalic and atraumatic.      Comments: Right temporal deformity     Right Ear: External ear normal.      Left Ear: External ear normal.      Nose: Nose normal.      Mouth/Throat:      Mouth: Mucous membranes are moist.   Eyes:      Conjunctiva/sclera: Conjunctivae normal.      Pupils: Pupils are equal, round, and reactive to light.   Cardiovascular:      Rate and Rhythm: Normal rate and regular rhythm.      Pulses: Normal pulses.      Heart sounds: Normal heart sounds.   Pulmonary:      Effort: Pulmonary effort is normal. Tachypnea present. No respiratory distress. He is intubated.      Breath sounds: No stridor. Rhonchi  present. No wheezing or rales.   Abdominal:      Palpations: Abdomen is soft.      Tenderness: There is no guarding.      Comments: Anterior abdominal wall with wound vac in place. Ostomy   Musculoskeletal:         General: No swelling or deformity.      Cervical back: Neck supple.      Right lower leg: No edema.      Left lower leg: No edema.   Lymphadenopathy:      Cervical: No cervical adenopathy.   Skin:     General: Skin is warm and dry.   Neurological:      Cranial Nerves: No cranial nerve deficit.      Comments: Currently Intubated and sedated         Vents:  Vent Mode: A/C (12/27/21 0345)  Set Rate: 16 BPM (12/27/21 0345)  Vt Set: 480 mL (12/27/21 0345)  Pressure Support: 12 cmH20 (12/27/21 0010)  PEEP/CPAP: 5 cmH20 (12/27/21 0345)  Oxygen Concentration (%): 50 (12/27/21 0400)  Peak Airway Pressure: 22 cmH2O (12/27/21 0345)  Total Ve: 14 mL (12/27/21 0345)  F/VT Ratio<105 (RSBI): (!) 79.02 (12/26/21 2000)    Lines/Drains/Airways     Central Venous Catheter Line                 Hemodialysis Catheter 12/15/21 0745 right internal jugular 12 days          Drain                 NG/OG Tube [place] sump 18 Fr. Left nostril -- days         Urethral Catheter 12/13/21 2130 16 Fr. 13 days         Colostomy 12/18/21 1030 Descending/sigmoid LUQ 9 days          Airway                 Airway - Non-Surgical 12/14/21 1047 13 days          Peripheral Intravenous Line                 Peripheral IV - Single Lumen 12/19/21 2126 20 G Anterior;Right Upper Arm 7 days         Peripheral IV - Single Lumen 12/26/21 2243 22 G Left;Posterior Hand <1 day                Significant Labs:    CBC/Anemia Profile:  Recent Labs   Lab 12/26/21  1225   HGB 6.7*   HCT 19.8*        Chemistries:  Recent Labs   Lab 12/27/21  0814   *   K 7.3*   CL 84*   CO2 12*   *   CREATININE 11.01*   CALCIUM 7.3*       All pertinent labs within the past 24 hours have been reviewed.    Significant Imaging:  I have reviewed all pertinent imaging  results/findings within the past 24 hours.    Assessment/Plan:     * Denice gangrene  - s/p multiple debridements  - wound vac in place  - ID consulted for antibiotic management: He was treated with rocephin, daptomycin, clindamycin  - polymicrobial on cultures. E Coli +  - plan to go back to OR on Monday 12/27  - ID note reviewed and appreicated. From 12/21- change clindamycin to ampicillin and treat for another week    12/27- plan to go back to OR today or tomorrow     Type 2 diabetes mellitus without complication, without long-term current use of insulin  -fasting glucose 327 -- will increase levemir today     Acute blood loss anemia  Do not see any evidence of bleeding hemoglobin down 6.8 will transfuse 1 unit packed red blood cells    - We currently have a blood shortage     Hypocalcemia  Replace     RAHAT (acute kidney injury)  - nephrology consulted for rapid acute rise in Cr  - thought to be related to septic ATN  - right IJ HD line   - 12/22/2021.  There is no sign of renal recovery.  Continuing with scheduled hemodialysis for this patient.  12/27 seen on HD - potassium 7.3 this morning - anemic with H/H 6.7/19.8-- I am told we have a shortage of blood - transfusion has been ordered but unsure when this will occur     On mechanically assisted ventilation  12/14- intubated   12/26- had issues with mucus plug yesterday. Underwent therapeutic bronchoscopy. No issues. He is tachypneic today but oxygenating adequately. Back to OR tomorrow and then we can push to try and get extubated  12/27- on vent, increase RR - settings AC RR 15, , peep 5, 50% - was scheduled to go to OR today; unsure if this has been canceled or not         This includes 31 minutes of critical care time spent evaluating and managing patient. This includes time spent at bedside, reviewing labs, data, xrays and monitoring for acute decompensation.           FLORESITA Shafer-ACNP  Pulmonology  [facility]

## 2021-12-27 NOTE — ASSESSMENT & PLAN NOTE
Do not see any evidence of bleeding hemoglobin down 6.8 will transfuse 1 unit packed red blood cells    - We currently have a blood shortage

## 2021-12-28 PROBLEM — A41.9 SEPTIC SHOCK: Status: RESOLVED | Noted: 2021-01-01 | Resolved: 2021-01-01

## 2021-12-28 PROBLEM — R65.21 SEPTIC SHOCK: Status: RESOLVED | Noted: 2021-01-01 | Resolved: 2021-01-01

## 2021-12-28 PROBLEM — Z79.02 ANTIPLATELET OR ANTITHROMBOTIC LONG-TERM USE: Status: RESOLVED | Noted: 2021-01-01 | Resolved: 2021-01-01

## 2021-12-28 NOTE — PROGRESS NOTES
[facility]  Pulmonology  Progress Note    Patient Name: Og Garcia  MRN: [id number]  Admission Date: 12/23/2021  Hospital Length of Stay: 5 days  Code Status: Full Code  Attending Provider: Cecil Abernathy DO  Primary Care Provider: Hector Arndt DNP, FNP-C   Principal Problem: Denice gangrene    Subjective:     Interval History: seen on PSV, increased RR, switched back to AC. He is NPO for surgery today. BMP pending       Objective:     Vital Signs (Most Recent):  Temp: 98.6 °F (37 °C) (12/28/21 1200)  Pulse: 92 (12/28/21 1200)  Resp: (!) 35 (12/28/21 1200)  BP: (!) 140/77 (12/28/21 1015)  SpO2: 100 % (12/28/21 1200) Vital Signs (24h Range):  Temp:  [98.3 °F (36.8 °C)-99.6 °F (37.6 °C)] 98.6 °F (37 °C)  Pulse:  [] 92  Resp:  [14-47] 35  SpO2:  [86 %-100 %] 100 %  BP: ()/(24-96) 140/77     Weight: 89.5 kg (197 lb 5 oz)  Body mass index is 27.52 kg/m².      Intake/Output Summary (Last 24 hours) at 12/28/2021 1213  Last data filed at 12/28/2021 1100  Gross per 24 hour   Intake 1876.83 ml   Output 500 ml   Net 1376.83 ml       Physical Exam  Vitals reviewed.   Constitutional:       General: He is not in acute distress.     Appearance: He is ill-appearing.      Interventions: He is sedated and intubated.      Comments: Intubated and sedated   HENT:      Head: Normocephalic and atraumatic.      Comments: Right temporal deformity     Right Ear: External ear normal.      Left Ear: External ear normal.      Nose: Nose normal.      Mouth/Throat:      Mouth: Mucous membranes are moist.   Eyes:      Conjunctiva/sclera: Conjunctivae normal.      Pupils: Pupils are equal, round, and reactive to light.   Cardiovascular:      Rate and Rhythm: Normal rate and regular rhythm.      Pulses: Normal pulses.      Heart sounds: Normal heart sounds.   Pulmonary:      Effort: Pulmonary effort is normal. Tachypnea present. No respiratory distress. He is intubated.      Breath sounds: No  stridor. No wheezing, rhonchi or rales.   Abdominal:      Palpations: Abdomen is soft.      Tenderness: There is no guarding.      Comments: Anterior abdominal wall with wound vac in place. Ostomy   Musculoskeletal:         General: No swelling.      Cervical back: Neck supple.   Lymphadenopathy:      Cervical: No cervical adenopathy.   Skin:     General: Skin is warm and dry.   Neurological:      Comments: Currently Intubated and sedated         Vents:  Vent Mode: A/C (12/28/21 0832)  Set Rate: 16 BPM (12/28/21 0832)  Vt Set: 480 mL (12/28/21 0832)  Pressure Support: 12 cmH20 (12/27/21 0010)  PEEP/CPAP: 5 cmH20 (12/28/21 0832)  Oxygen Concentration (%): 50 (12/27/21 0400)  Peak Airway Pressure: 58 cmH2O (12/28/21 0028)  Total Ve: 12.6 mL (12/28/21 0832)  F/VT Ratio<105 (RSBI): (!) 79.02 (12/26/21 2000)    Lines/Drains/Airways     Central Venous Catheter Line                 Hemodialysis Catheter 12/15/21 0745 right internal jugular 13 days          Drain                 NG/OG Tube [place] sump 18 Fr. Left nostril -- days         Urethral Catheter 12/13/21 2130 16 Fr. 14 days         Colostomy 12/18/21 1030 Descending/sigmoid LUQ 10 days          Airway                 Airway - Non-Surgical 12/14/21 1047 14 days          Peripheral Intravenous Line                 Peripheral IV - Single Lumen 12/19/21 2126 20 G Anterior;Right Upper Arm 8 days                Significant Labs:    CBC/Anemia Profile:  Recent Labs   Lab 12/26/21  1225 12/27/21  1923 12/28/21  0356   WBC  --   --  33.39*   HGB 6.7* 6.8* 7.7*   HCT 19.8* 20.8* 23.6*   PLT  --   --  353   MCV  --   --  90.8   RDW  --   --  17.8*        Chemistries:  Recent Labs   Lab 12/27/21  0814   *   K 7.3*   CL 84*   CO2 12*   *   CREATININE 11.01*   CALCIUM 7.3*       All pertinent labs within the past 24 hours have been reviewed.    Significant Imaging:  I have reviewed all pertinent imaging results/findings within the past 24 hours.    Assessment/Plan:      * Denice gangrene  - s/p multiple debridements  - wound vac in place  - ID consulted for antibiotic management: He was treated with rocephin, daptomycin, clindamycin  - polymicrobial on cultures. E Coli +  - plan to go back to OR on Monday 12/27  - ID note reviewed and appreicated. From 12/21- change clindamycin to ampicillin and treat for another week    12/28--plan to go back to OR today    Type 2 diabetes mellitus without complication, without long-term current use of insulin  Improving but still elevated   12/28- fasting glucose 283 - increase levemir tonight     Acute blood loss anemia  Do not see any evidence of bleeding hemoglobin down 6.8 will transfuse 1 unit packed red blood cells    -transfused 12/27-  H/H 7.7/23.6     RAHAT (acute kidney injury)  - nephrology consulted for rapid acute rise in Cr  - thought to be related to septic ATN  - right IJ HD line   - 12/22/2021.  There is no sign of renal recovery.  Continuing with scheduled hemodialysis for this patient.  12/27 seen on HD - anemia with H/H 6.7/19.8--   12/28- BMP pending     On mechanically assisted ventilation  12/14- intubated   12/26- had issues with mucus plug yesterday. Underwent therapeutic bronchoscopy. No issues. He is tachypneic today but oxygenating adequately. Back to OR tomorrow and then we can push to try and get extubated  12/27- on vent, increase RR - settings AC RR 15, , peep 5, 50% -  12/18 - failed PSV trail this afternoon. Placed back on AC, scheduled for OR today    Hypertension  Stable- continue with current care                 FLORESITA Shafer-ACNP  Pulmonology  [facility]

## 2021-12-28 NOTE — PROGRESS NOTES
[facility]  General Surgery  Progress Note    Subjective:     Interval History: Patient supposed to go for washout in OR today but potassium still too high.  Bedside washout/woundvac change performed    Post-Op Info:  * No surgery found *          Medications:  Continuous Infusions:  Scheduled Meds:   ampicillin-sulbactim (UNASYN) IVPB  1.5 g Intravenous Q12H    heparin (porcine)  5,000 Units Subcutaneous Q8H    insulin detemir U-100  45 Units Subcutaneous QHS    pantoprazole  40 mg Per NG tube Daily    ticagrelor  90 mg Oral BID     PRN Meds:sodium chloride, sodium chloride 0.9%, acetaminophen, dextrose 50%, fentanyl, glucagon (human recombinant), heparin (porcine), insulin aspart U-100, NORepinephrine bitartrate-D5W, propofoL, sodium chloride 0.9%     Objective:     Vital Signs (Most Recent):  Temp: 98.7 °F (37.1 °C) (12/28/21 1450)  Pulse: 96 (12/28/21 1515)  Resp: (!) 28 (12/28/21 1515)  BP: (!) 96/55 (12/28/21 1515)  SpO2: 100 % (12/28/21 1515) Vital Signs (24h Range):  Temp:  [98.3 °F (36.8 °C)-99.6 °F (37.6 °C)] 98.7 °F (37.1 °C)  Pulse:  [] 96  Resp:  [14-47] 28  SpO2:  [95 %-100 %] 100 %  BP: ()/(24-96) 96/55       Intake/Output Summary (Last 24 hours) at 12/28/2021 1640  Last data filed at 12/28/2021 1100  Gross per 24 hour   Intake 1319.33 ml   Output 500 ml   Net 819.33 ml       Physical Exam  Constitutional:       Appearance: He is toxic-appearing.   Pulmonary:      Effort: Respiratory distress present.   Abdominal:      General: There is distension.         Significant Labs:  CBC:   Recent Labs   Lab 12/28/21  0356   WBC 33.39*   RBC 2.60*   HGB 7.7*   HCT 23.6*      MCV 90.8   MCH 29.6   MCHC 32.6     CMP:   Recent Labs   Lab 12/28/21  1137   *   CALCIUM 7.4*   *   K 7.1*   CO2 16*   CL 89*   *   CREATININE 9.14*       Significant Diagnostics:  None    Assessment/Plan:     Active Diagnoses:    Diagnosis Date Noted POA    PRINCIPAL  PROBLEM:  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] 12/13/2021 Yes    Acute blood loss anemia [D62] 12/25/2021 No    Type 2 diabetes mellitus without complication, without long-term current use of insulin [E11.9] 12/25/2021 Unknown    Necrotizing fasciitis [M72.6]  Yes    Hypocalcemia [E83.51] 12/16/2021 Yes    RAHAT (acute kidney injury) [N17.9] 12/14/2021 Yes    On mechanically assisted ventilation [Z99.11] 12/14/2021 Not Applicable    Hypertension [I10] 09/02/2021 Yes      Problems Resolved During this Admission:     Changed packing in rectal area at bedside, slough present and no additional tracking/ appreciated.  Packed a kerlex in the abdominal wound.  Abdominal wound vac with some purulence in the paracolic gutters which we cleaned and washed out at bedside and new packing performed with gauze in the sides of the rectus sheath.  Continue dialysis and aggresssaive vac changes.      Harish Cazares MD  General Surgery  [facility]

## 2021-12-28 NOTE — ASSESSMENT & PLAN NOTE
Do not see any evidence of bleeding hemoglobin down 6.8 will transfuse 1 unit packed red blood cells    -transfused 12/27-  H/H 7.7/23.6

## 2021-12-28 NOTE — SUBJECTIVE & OBJECTIVE
Interval History: seen on PSV, increased RR. He is NPO for surgery today. BMP pending       Objective:     Vital Signs (Most Recent):  Temp: 98.6 °F (37 °C) (12/28/21 1200)  Pulse: 92 (12/28/21 1200)  Resp: (!) 35 (12/28/21 1200)  BP: (!) 140/77 (12/28/21 1015)  SpO2: 100 % (12/28/21 1200) Vital Signs (24h Range):  Temp:  [98.3 °F (36.8 °C)-99.6 °F (37.6 °C)] 98.6 °F (37 °C)  Pulse:  [] 92  Resp:  [14-47] 35  SpO2:  [86 %-100 %] 100 %  BP: ()/(24-96) 140/77     Weight: 89.5 kg (197 lb 5 oz)  Body mass index is 27.52 kg/m².      Intake/Output Summary (Last 24 hours) at 12/28/2021 1213  Last data filed at 12/28/2021 1100  Gross per 24 hour   Intake 1876.83 ml   Output 500 ml   Net 1376.83 ml       Physical Exam  Vitals reviewed.   Constitutional:       General: He is not in acute distress.     Appearance: He is ill-appearing.      Interventions: He is sedated and intubated.      Comments: Intubated and sedated   HENT:      Head: Normocephalic and atraumatic.      Comments: Right temporal deformity     Right Ear: External ear normal.      Left Ear: External ear normal.      Nose: Nose normal.      Mouth/Throat:      Mouth: Mucous membranes are moist.   Eyes:      Conjunctiva/sclera: Conjunctivae normal.      Pupils: Pupils are equal, round, and reactive to light.   Cardiovascular:      Rate and Rhythm: Normal rate and regular rhythm.      Pulses: Normal pulses.      Heart sounds: Normal heart sounds.   Pulmonary:      Effort: Pulmonary effort is normal. Tachypnea present. No respiratory distress. He is intubated.      Breath sounds: No stridor. No wheezing, rhonchi or rales.   Abdominal:      Palpations: Abdomen is soft.      Tenderness: There is no guarding.      Comments: Anterior abdominal wall with wound vac in place. Ostomy   Musculoskeletal:         General: No swelling.      Cervical back: Neck supple.   Lymphadenopathy:      Cervical: No cervical adenopathy.   Skin:     General: Skin is warm and  dry.   Neurological:      Comments: Currently Intubated and sedated         Vents:  Vent Mode: A/C (12/28/21 0832)  Set Rate: 16 BPM (12/28/21 0832)  Vt Set: 480 mL (12/28/21 0832)  Pressure Support: 12 cmH20 (12/27/21 0010)  PEEP/CPAP: 5 cmH20 (12/28/21 0832)  Oxygen Concentration (%): 50 (12/27/21 0400)  Peak Airway Pressure: 58 cmH2O (12/28/21 0028)  Total Ve: 12.6 mL (12/28/21 0832)  F/VT Ratio<105 (RSBI): (!) 79.02 (12/26/21 2000)    Lines/Drains/Airways     Central Venous Catheter Line                 Hemodialysis Catheter 12/15/21 0745 right internal jugular 13 days          Drain                 NG/OG Tube [place] sump 18 Fr. Left nostril -- days         Urethral Catheter 12/13/21 2130 16 Fr. 14 days         Colostomy 12/18/21 1030 Descending/sigmoid LUQ 10 days          Airway                 Airway - Non-Surgical 12/14/21 1047 14 days          Peripheral Intravenous Line                 Peripheral IV - Single Lumen 12/19/21 2126 20 G Anterior;Right Upper Arm 8 days                Significant Labs:    CBC/Anemia Profile:  Recent Labs   Lab 12/26/21  1225 12/27/21  1923 12/28/21  0356   WBC  --   --  33.39*   HGB 6.7* 6.8* 7.7*   HCT 19.8* 20.8* 23.6*   PLT  --   --  353   MCV  --   --  90.8   RDW  --   --  17.8*        Chemistries:  Recent Labs   Lab 12/27/21  0814   *   K 7.3*   CL 84*   CO2 12*   *   CREATININE 11.01*   CALCIUM 7.3*       All pertinent labs within the past 24 hours have been reviewed.    Significant Imaging:  I have reviewed all pertinent imaging results/findings within the past 24 hours.

## 2021-12-28 NOTE — ASSESSMENT & PLAN NOTE
- s/p multiple debridements  - wound vac in place  - ID consulted for antibiotic management: He was treated with rocephin, daptomycin, clindamycin  - polymicrobial on cultures. E Coli +  - plan to go back to OR on Monday 12/27  - ID note reviewed and appreicated. From 12/21- change clindamycin to ampicillin and treat for another week    12/28--plan to go back to OR today

## 2021-12-28 NOTE — ASSESSMENT & PLAN NOTE
12/14- intubated   12/26- had issues with mucus plug yesterday. Underwent therapeutic bronchoscopy. No issues. He is tachypneic today but oxygenating adequately. Back to OR tomorrow and then we can push to try and get extubated  12/27- on vent, increase RR - settings AC RR 15, , peep 5, 50% -  12/18 - failed PSV trail this afternoon. Placed back on AC, scheduled for OR today

## 2021-12-28 NOTE — ASSESSMENT & PLAN NOTE
- nephrology consulted for rapid acute rise in Cr  - thought to be related to septic ATN  - right IJ HD line   - 12/22/2021.  There is no sign of renal recovery.  Continuing with scheduled hemodialysis for this patient.  12/27 seen on HD - anemia with H/H 6.7/19.8--   12/28- BMP pending

## 2021-12-28 NOTE — INTERVAL H&P NOTE
The patient has been examined and the H&P has been reviewed:    I concur with the findings and no changes have occurred since H&P was written.  Patient to go for a washout and possible dialysis catheter permanent.  He is showing signs of sepsis with increased white count.  Will check his lab work this morning and hopefully the potassium is corrected, hyponatremia is probably not fully corrected but given his clinical picture might require surgical intervention rather urgently.  Discussed with family.  All questions were answered.        Active Hospital Problems    Diagnosis  POA    *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]  Yes    Acute blood loss anemia [D62]  Unknown    Type 2 diabetes mellitus without complication, without long-term current use of insulin [E11.9]  Unknown    Necrotizing fasciitis [M72.6]  Yes    Hypocalcemia [E83.51]  Yes    RAHAT (acute kidney injury) [N17.9]  Yes    On mechanically assisted ventilation [Z99.11]  Not Applicable    Hypertension [I10]  Yes      Resolved Hospital Problems   No resolved problems to display.

## 2021-12-28 NOTE — NURSING
Wound care note: 65yo male, admitted to [facility] with diagnosis of Denice gangrene. Wound vac reapplied to abdomen at 125mmhg. Dakin's soln gauze applied to perineum.

## 2021-12-29 NOTE — PROGRESS NOTES
[facility]  Pulmonology  Progress Note    Patient Name: Og Garcia  MRN: [id number]  Admission Date: 12/23/2021  Hospital Length of Stay: 6 days  Code Status: Full Code  Attending Provider: Cecil Abernathy DO  Primary Care Provider: Hector Arndt DNP, FNP-C   Principal Problem: Denice gangrene    Subjective:     Interval History: family at bedside. Pt seen in AC, intubated and lightly sedated, will open eyes and follow commands     Objective:     Vital Signs (Most Recent):  Temp: 99.5 °F (37.5 °C) (12/29/21 0300)  Pulse: (!) 111 (12/29/21 0700)  Resp: (!) 47 (12/29/21 0700)  BP: (!) 146/79 (12/29/21 0700)  SpO2: 100 % (12/29/21 0700) Vital Signs (24h Range):  Temp:  [98.3 °F (36.8 °C)-99.5 °F (37.5 °C)] 99.5 °F (37.5 °C)  Pulse:  [] 111  Resp:  [19-47] 47  SpO2:  [100 %] 100 %  BP: ()/(49-79) 146/79     Weight: 94.7 kg (208 lb 12.4 oz)  Body mass index is 29.12 kg/m².      Intake/Output Summary (Last 24 hours) at 12/29/2021 1020  Last data filed at 12/29/2021 0739  Gross per 24 hour   Intake 1700 ml   Output 2000 ml   Net -300 ml       Physical Exam  Vitals reviewed.   Constitutional:       General: He is not in acute distress.     Appearance: He is ill-appearing.      Interventions: He is sedated and intubated.      Comments: Intubated and sedated   HENT:      Head: Normocephalic and atraumatic.      Comments: Right temporal deformity     Right Ear: External ear normal.      Left Ear: External ear normal.      Nose: Nose normal.      Mouth/Throat:      Mouth: Mucous membranes are moist.   Eyes:      Conjunctiva/sclera: Conjunctivae normal.      Pupils: Pupils are equal, round, and reactive to light.   Cardiovascular:      Rate and Rhythm: Normal rate and regular rhythm.      Pulses: Normal pulses.      Heart sounds: Normal heart sounds.   Pulmonary:      Effort: Pulmonary effort is normal. Tachypnea present. No respiratory distress. He is intubated.      Breath  sounds: No stridor. No wheezing, rhonchi or rales.   Abdominal:      Palpations: Abdomen is soft.      Tenderness: There is no guarding.      Comments: Anterior abdominal wall with wound vac in place. Ostomy   Musculoskeletal:         General: No swelling.      Cervical back: Neck supple.   Lymphadenopathy:      Cervical: No cervical adenopathy.   Skin:     General: Skin is warm and dry.   Neurological:      Comments: Currently Intubated and sedated         Vents:  Vent Mode: A/C (12/29/21 0036)  Set Rate: 16 BPM (12/29/21 0036)  Vt Set: 480 mL (12/29/21 0036)  Pressure Support: 12 cmH20 (12/29/21 0036)  PEEP/CPAP: 5 cmH20 (12/29/21 0036)  Oxygen Concentration (%): 50 (12/29/21 0000)  Peak Airway Pressure: 37 cmH2O (12/29/21 0036)  Total Ve: 11.6 mL (12/29/21 0036)  F/VT Ratio<105 (RSBI): (!) 79.02 (12/26/21 2000)    Lines/Drains/Airways     Central Venous Catheter Line                 Hemodialysis Catheter 12/15/21 0745 right internal jugular 14 days          Drain                 NG/OG Tube [place] sump 18 Fr. Left nostril -- days         Urethral Catheter 12/13/21 2130 16 Fr. 15 days         Colostomy 12/18/21 1030 Descending/sigmoid LUQ 10 days          Airway                 Airway - Non-Surgical 12/14/21 1047 14 days          Peripheral Intravenous Line                 Peripheral IV - Single Lumen 12/19/21 2126 20 G Anterior;Right Upper Arm 9 days                Significant Labs:    CBC/Anemia Profile:  Recent Labs   Lab 12/27/21  1923 12/28/21  0356 12/29/21  0833   WBC  --  33.39* 21.75*   HGB 6.8* 7.7* 6.5*   HCT 20.8* 23.6* 18.6*   PLT  --  353 353   MCV  --  90.8 86.5   RDW  --  17.8* 17.5*        Chemistries:  Recent Labs   Lab 12/28/21  1137 12/29/21  0833   * 131*   K 7.1* 5.1   CL 89* 94*   CO2 16* 17*   * 99*   CREATININE 9.14* 6.51*   CALCIUM 7.4* 7.2*       All pertinent labs within the past 24 hours have been reviewed.    Significant Imaging:  I have reviewed all pertinent imaging  results/findings within the past 24 hours.    Assessment/Plan:     * Denice gangrene  - s/p multiple debridements  - wound vac in place  - ID consulted for antibiotic management: He was treated with rocephin, daptomycin, clindamycin  - polymicrobial on cultures. E Coli +  - plan to go back to OR on Monday 12/27  - ID note reviewed and appreicated. From 12/21- change clindamycin to ampicillin and treat for another week    12/28--wound vac changed at bedside       Type 2 diabetes mellitus without complication, without long-term current use of insulin  Improving but still elevated   12/28- fasting glucose 283 - increase levemir tonight   12/29- fasting 189     Acute blood loss anemia  Do not see any evidence of bleeding hemoglobin down 6.8 will transfuse 1 unit packed red blood cells    -transfused 12/27-  H/H 7.7/23.6   12/29-- H/H down to 6.5/18.6 today - will transfuse one unit PRBCs     Hypocalcemia  Replaced   - 12/29- calcium 7.2- ionized calcium pending     RAHAT (acute kidney injury)  - nephrology consulted for rapid acute rise in Cr  - thought to be related to septic ATN  - right IJ HD line   - 12/22/2021.  There is no sign of renal recovery.  Continuing with scheduled hemodialysis for this patient.  12/27 seen on HD - anemia with H/H 6.7/19.8--   12/29- potassium 7.1 yesterday - has HD again -- repeat K+ today 5.1- UOP 100cc in last 24 hours     On mechanically assisted ventilation  12/14- intubated   12/26- had issues with mucus plug yesterday. Underwent therapeutic bronchoscopy. No issues. He is tachypneic today but oxygenating adequately. Back to OR tomorrow and then we can push to try and get extubated  12/27- on vent, increase RR - settings AC RR 15, , peep 5, 50% -  12/28 - failed PSV trail this afternoon. Placed back on AC  12/29- 15 days intubated,  Consider trach placement.                  Elinor Baker, AG-ACNP  Pulmonology  [facility]

## 2021-12-29 NOTE — ASSESSMENT & PLAN NOTE
12/14- intubated   12/26- had issues with mucus plug yesterday. Underwent therapeutic bronchoscopy. No issues. He is tachypneic today but oxygenating adequately. Back to OR tomorrow and then we can push to try and get extubated  12/27- on vent, increase RR - settings AC RR 15, , peep 5, 50% -  12/28 - failed PSV trail this afternoon. Placed back on AC  12/29- 15 days intubated,  Consider trach placement.

## 2021-12-29 NOTE — ASSESSMENT & PLAN NOTE
Improving but still elevated   12/28- fasting glucose 283 - increase levemir tonight   12/29- fasting 189

## 2021-12-29 NOTE — PLAN OF CARE
Plans of care ongoing  Problem: Adult Inpatient Plan of Care  Goal: Plan of Care Review  Outcome: Ongoing, Progressing  Goal: Patient-Specific Goal (Individualized)  Outcome: Ongoing, Progressing  Goal: Absence of Hospital-Acquired Illness or Injury  Outcome: Ongoing, Progressing  Goal: Optimal Comfort and Wellbeing  Outcome: Ongoing, Progressing  Goal: Readiness for Transition of Care  Outcome: Ongoing, Progressing     Problem: Adjustment to Illness (Sepsis/Septic Shock)  Goal: Optimal Coping  Outcome: Ongoing, Progressing     Problem: Bleeding (Sepsis/Septic Shock)  Goal: Absence of Bleeding  Outcome: Ongoing, Progressing     Problem: Glycemic Control Impaired (Sepsis/Septic Shock)  Goal: Blood Glucose Level Within Desired Range  Outcome: Ongoing, Progressing     Problem: Infection Progression (Sepsis/Septic Shock)  Goal: Absence of Infection Signs and Symptoms  Outcome: Ongoing, Progressing     Problem: Nutrition Impaired (Sepsis/Septic Shock)  Goal: Optimal Nutrition Intake  Outcome: Ongoing, Progressing     Problem: Fluid and Electrolyte Imbalance (Acute Kidney Injury/Impairment)  Goal: Fluid and Electrolyte Balance  Outcome: Ongoing, Progressing     Problem: Oral Intake Inadequate (Acute Kidney Injury/Impairment)  Goal: Optimal Nutrition Intake  Outcome: Ongoing, Progressing     Problem: Renal Function Impairment (Acute Kidney Injury/Impairment)  Goal: Effective Renal Function  Outcome: Ongoing, Progressing

## 2021-12-29 NOTE — PROGRESS NOTES
Wound care note:  Patient in bed, eyes open, on ventilator at present.  Rectal wound with tan slough noted, mild odor  Cont dakins moist gauze  Mid line abdominal wound with tan tissue, outer edges moist   Applied Moist Dakin kerlix packing due to NPWT malfunctioned. Waiting on [employer] for another machine. Dr phillip was notified of NPWT for now.

## 2021-12-29 NOTE — ASSESSMENT & PLAN NOTE
Do not see any evidence of bleeding hemoglobin down 6.8 will transfuse 1 unit packed red blood cells    -transfused 12/27-  H/H 7.7/23.6   12/29-- H/H down to 6.5/18.6 today - will transfuse one unit PRBCs

## 2021-12-29 NOTE — ASSESSMENT & PLAN NOTE
- nephrology consulted for rapid acute rise in Cr  - thought to be related to septic ATN  - right IJ HD line   - 12/22/2021.  There is no sign of renal recovery.  Continuing with scheduled hemodialysis for this patient.  12/27 seen on HD - anemia with H/H 6.7/19.8--   12/29- potassium 7.1 yesterday - has HD again -- repeat K+ today 5.1- UOP 100cc in last 24 hours

## 2021-12-29 NOTE — ASSESSMENT & PLAN NOTE
- s/p multiple debridements  - wound vac in place  - ID consulted for antibiotic management: He was treated with rocephin, daptomycin, clindamycin  - polymicrobial on cultures. E Coli +  - plan to go back to OR on Monday 12/27  - ID note reviewed and appreicated. From 12/21- change clindamycin to ampicillin and treat for another week    12/28--wound vac changed at bedside

## 2021-12-29 NOTE — SUBJECTIVE & OBJECTIVE
Interval History: family at bedside. Pt seen in , intubated and lightly sedated, will open eyes and follow commands     Objective:     Vital Signs (Most Recent):  Temp: 99.5 °F (37.5 °C) (12/29/21 0300)  Pulse: (!) 111 (12/29/21 0700)  Resp: (!) 47 (12/29/21 0700)  BP: (!) 146/79 (12/29/21 0700)  SpO2: 100 % (12/29/21 0700) Vital Signs (24h Range):  Temp:  [98.3 °F (36.8 °C)-99.5 °F (37.5 °C)] 99.5 °F (37.5 °C)  Pulse:  [] 111  Resp:  [19-47] 47  SpO2:  [100 %] 100 %  BP: ()/(49-79) 146/79     Weight: 94.7 kg (208 lb 12.4 oz)  Body mass index is 29.12 kg/m².      Intake/Output Summary (Last 24 hours) at 12/29/2021 1020  Last data filed at 12/29/2021 0739  Gross per 24 hour   Intake 1700 ml   Output 2000 ml   Net -300 ml       Physical Exam  Vitals reviewed.   Constitutional:       General: He is not in acute distress.     Appearance: He is ill-appearing.      Interventions: He is sedated and intubated.      Comments: Intubated and sedated   HENT:      Head: Normocephalic and atraumatic.      Comments: Right temporal deformity     Right Ear: External ear normal.      Left Ear: External ear normal.      Nose: Nose normal.      Mouth/Throat:      Mouth: Mucous membranes are moist.   Eyes:      Conjunctiva/sclera: Conjunctivae normal.      Pupils: Pupils are equal, round, and reactive to light.   Cardiovascular:      Rate and Rhythm: Normal rate and regular rhythm.      Pulses: Normal pulses.      Heart sounds: Normal heart sounds.   Pulmonary:      Effort: Pulmonary effort is normal. Tachypnea present. No respiratory distress. He is intubated.      Breath sounds: No stridor. No wheezing, rhonchi or rales.   Abdominal:      Palpations: Abdomen is soft.      Tenderness: There is no guarding.      Comments: Anterior abdominal wall with wound vac in place. Ostomy   Musculoskeletal:         General: No swelling.      Cervical back: Neck supple.   Lymphadenopathy:      Cervical: No cervical adenopathy.    Skin:     General: Skin is warm and dry.   Neurological:      Comments: Currently Intubated and sedated         Vents:  Vent Mode: A/C (12/29/21 0036)  Set Rate: 16 BPM (12/29/21 0036)  Vt Set: 480 mL (12/29/21 0036)  Pressure Support: 12 cmH20 (12/29/21 0036)  PEEP/CPAP: 5 cmH20 (12/29/21 0036)  Oxygen Concentration (%): 50 (12/29/21 0000)  Peak Airway Pressure: 37 cmH2O (12/29/21 0036)  Total Ve: 11.6 mL (12/29/21 0036)  F/VT Ratio<105 (RSBI): (!) 79.02 (12/26/21 2000)    Lines/Drains/Airways     Central Venous Catheter Line                 Hemodialysis Catheter 12/15/21 0745 right internal jugular 14 days          Drain                 NG/OG Tube [place] sump 18 Fr. Left nostril -- days         Urethral Catheter 12/13/21 2130 16 Fr. 15 days         Colostomy 12/18/21 1030 Descending/sigmoid LUQ 10 days          Airway                 Airway - Non-Surgical 12/14/21 1047 14 days          Peripheral Intravenous Line                 Peripheral IV - Single Lumen 12/19/21 2126 20 G Anterior;Right Upper Arm 9 days                Significant Labs:    CBC/Anemia Profile:  Recent Labs   Lab 12/27/21  1923 12/28/21  0356 12/29/21  0833   WBC  --  33.39* 21.75*   HGB 6.8* 7.7* 6.5*   HCT 20.8* 23.6* 18.6*   PLT  --  353 353   MCV  --  90.8 86.5   RDW  --  17.8* 17.5*        Chemistries:  Recent Labs   Lab 12/28/21  1137 12/29/21  0833   * 131*   K 7.1* 5.1   CL 89* 94*   CO2 16* 17*   * 99*   CREATININE 9.14* 6.51*   CALCIUM 7.4* 7.2*       All pertinent labs within the past 24 hours have been reviewed.    Significant Imaging:  I have reviewed all pertinent imaging results/findings within the past 24 hours.

## 2021-12-29 NOTE — PROGRESS NOTES
12/29/2021 temporary dialysis catheter accidentally came out unable to dialyze today, he did receive dialysis 2 consecutive days due to hyperkalemia.  Today potassium is  5.1   , plan for tunneled HD cath in OR tomorrow per dr Hui  Discussed with Dr. Avalos and critical care team    Would like to have brilinta wear off prior to trach hold brilinta 5days and dr phillip can do trach next week

## 2021-12-30 NOTE — SUBJECTIVE & OBJECTIVE
Interval History: HD line accidentally removed yesterday. Plan for tunneled line placement today. Remains intubated. PRBC pending HD.     Objective:     Vital Signs (Most Recent):  Temp: 99.2 °F (37.3 °C) (12/30/21 0400)  Pulse: 100 (12/30/21 0700)  Resp: (!) 36 (12/30/21 0700)  BP: 112/66 (12/30/21 0700)  SpO2: 100 % (12/30/21 0700) Vital Signs (24h Range):  Temp:  [97 °F (36.1 °C)-99.2 °F (37.3 °C)] 97 °F (36.1 °C)  Pulse:  [] 94  Resp:  [16-44] 16  SpO2:  [100 %] 100 %  BP: (103-148)/(58-89) 131/63     Weight: 94.7 kg (208 lb 12.4 oz)  Body mass index is 29.12 kg/m².      Intake/Output Summary (Last 24 hours) at 12/30/2021 1332  Last data filed at 12/30/2021 1053  Gross per 24 hour   Intake 1747.16 ml   Output 775 ml   Net 972.16 ml       Physical Exam  Vitals reviewed.   Constitutional:       Appearance: He is ill-appearing.      Comments: Intubated and sedated   HENT:      Head:      Comments: Right temporal deformity     Nose: Nose normal.      Mouth/Throat:      Mouth: Mucous membranes are moist.   Eyes:      Conjunctiva/sclera: Conjunctivae normal.      Pupils: Pupils are equal, round, and reactive to light.   Cardiovascular:      Rate and Rhythm: Normal rate and regular rhythm.      Pulses: Normal pulses.      Heart sounds: Normal heart sounds.   Pulmonary:      Effort: No respiratory distress.      Breath sounds: No stridor. No wheezing, rhonchi or rales.      Comments: Ventilator driven breath sounds bilaterally  Abdominal:      Palpations: Abdomen is soft.      Tenderness: There is no guarding.      Comments: Anterior abdominal wall with wound vac in place. Ostomy   Genitourinary:     Comments: Packing in place  Musculoskeletal:         General: No swelling or deformity.      Cervical back: Neck supple.      Right lower leg: No edema.      Left lower leg: No edema.   Lymphadenopathy:      Cervical: No cervical adenopathy.   Skin:     General: Skin is warm and dry.   Neurological:      Cranial  Nerves: No cranial nerve deficit.      Comments: Intubated and sedated         Vents:  Vent Mode: A/C (12/30/21 0900)  Set Rate: 16 BPM (12/30/21 0900)  Vt Set: 480 mL (12/30/21 0900)  Pressure Support: 12 cmH20 (12/29/21 0036)  PEEP/CPAP: 5 cmH20 (12/30/21 0900)  Oxygen Concentration (%): 50 (12/30/21 0500)  Peak Airway Pressure: 16 cmH2O (12/30/21 0900)  Total Ve: 10.4 mL (12/30/21 0900)  F/VT Ratio<105 (RSBI): (!) 75.95 (12/29/21 0915)    Lines/Drains/Airways     Drain                 NG/OG Tube [place] sump 18 Fr. Left nostril -- days         Urethral Catheter 12/13/21 2130 16 Fr. 16 days         Colostomy 12/18/21 1030 Descending/sigmoid LUQ 12 days          Airway                 Airway - Non-Surgical 12/14/21 1047 16 days          Peripheral Intravenous Line                 Peripheral IV - Single Lumen 12/19/21 2126 20 G Anterior;Right Upper Arm 10 days         Peripheral IV - Single Lumen 12/29/21 1532 20 G Anterior;Left Forearm <1 day                Significant Labs:    CBC/Anemia Profile:  Recent Labs   Lab 12/29/21  0833 12/30/21  0721   WBC 21.75* 17.98*   HGB 6.5* 6.8*   HCT 18.6* 19.6*    322   MCV 86.5 85.6   RDW 17.5* 17.6*        Chemistries:  Recent Labs   Lab 12/29/21  0833 12/30/21  0721   * 131*   K 5.1 6.1*   CL 94* 95*   CO2 17* 13*   BUN 99* 140*   CREATININE 6.51* 7.79*   CALCIUM 7.2* 7.2*       All pertinent labs within the past 24 hours have been reviewed.    Significant Imaging:  I have reviewed all pertinent imaging results/findings within the past 24 hours.

## 2021-12-30 NOTE — ASSESSMENT & PLAN NOTE
- nephrology consulted   - no sign of renal recovery.  Continuing with scheduled hemodialysis for this patient.  - tunneled HD line placement 12/30  - plan for HD today with 1PRBC

## 2021-12-30 NOTE — ASSESSMENT & PLAN NOTE
- s/p multiple debridements  - wound vac in place  - ID consulted for antibiotic management: He was treated with rocephin, daptomycin, clindamycin. 12/21- change clindamycin to ampicillin and treat for another week  - 12/28 wound vac changed at bedside

## 2021-12-30 NOTE — PLAN OF CARE
Per MD notes, pt will have HD cath placed today in surgery, holding Brilinta with plans for Trach to be done next week.  Remains on vent, getting dialysis, cont to follow for dc needs.

## 2021-12-30 NOTE — PROGRESS NOTES
[facility]  Pulmonology  Progress Note    Patient Name: Og Garcia  MRN: [id number]  Admission Date: 12/23/2021  Hospital Length of Stay: 7 days  Code Status: Prior  Attending Provider: Cecil Abernathy DO  Primary Care Provider: Hector Arndt DNP, FNP-C   Principal Problem: Denice gangrene    Subjective:     Interval History: HD line accidentally removed yesterday. Plan for tunneled line placement today. Remains intubated. PRBC pending HD.     Objective:     Vital Signs (Most Recent):  Temp: 99.2 °F (37.3 °C) (12/30/21 0400)  Pulse: 100 (12/30/21 0700)  Resp: (!) 36 (12/30/21 0700)  BP: 112/66 (12/30/21 0700)  SpO2: 100 % (12/30/21 0700) Vital Signs (24h Range):  Temp:  [97 °F (36.1 °C)-99.2 °F (37.3 °C)] 97 °F (36.1 °C)  Pulse:  [] 94  Resp:  [16-44] 16  SpO2:  [100 %] 100 %  BP: (103-148)/(58-89) 131/63     Weight: 94.7 kg (208 lb 12.4 oz)  Body mass index is 29.12 kg/m².      Intake/Output Summary (Last 24 hours) at 12/30/2021 1332  Last data filed at 12/30/2021 1053  Gross per 24 hour   Intake 1747.16 ml   Output 775 ml   Net 972.16 ml       Physical Exam  Vitals reviewed.   Constitutional:       Appearance: He is ill-appearing.      Comments: Intubated and sedated   HENT:      Head:      Comments: Right temporal deformity     Nose: Nose normal.      Mouth/Throat:      Mouth: Mucous membranes are moist.   Eyes:      Conjunctiva/sclera: Conjunctivae normal.      Pupils: Pupils are equal, round, and reactive to light.   Cardiovascular:      Rate and Rhythm: Normal rate and regular rhythm.      Pulses: Normal pulses.      Heart sounds: Normal heart sounds.   Pulmonary:      Effort: No respiratory distress.      Breath sounds: No stridor. No wheezing, rhonchi or rales.      Comments: Ventilator driven breath sounds bilaterally  Abdominal:      Palpations: Abdomen is soft.      Tenderness: There is no guarding.      Comments: Anterior abdominal wall with wound vac in place.  Ostomy   Genitourinary:     Comments: Packing in place  Musculoskeletal:         General: No swelling or deformity.      Cervical back: Neck supple.      Right lower leg: No edema.      Left lower leg: No edema.   Lymphadenopathy:      Cervical: No cervical adenopathy.   Skin:     General: Skin is warm and dry.   Neurological:      Cranial Nerves: No cranial nerve deficit.      Comments: Intubated and sedated         Vents:  Vent Mode: A/C (12/30/21 0900)  Set Rate: 16 BPM (12/30/21 0900)  Vt Set: 480 mL (12/30/21 0900)  Pressure Support: 12 cmH20 (12/29/21 0036)  PEEP/CPAP: 5 cmH20 (12/30/21 0900)  Oxygen Concentration (%): 50 (12/30/21 0500)  Peak Airway Pressure: 16 cmH2O (12/30/21 0900)  Total Ve: 10.4 mL (12/30/21 0900)  F/VT Ratio<105 (RSBI): (!) 75.95 (12/29/21 0915)    Lines/Drains/Airways     Drain                 NG/OG Tube [place] sump 18 Fr. Left nostril -- days         Urethral Catheter 12/13/21 2130 16 Fr. 16 days         Colostomy 12/18/21 1030 Descending/sigmoid LUQ 12 days          Airway                 Airway - Non-Surgical 12/14/21 1047 16 days          Peripheral Intravenous Line                 Peripheral IV - Single Lumen 12/19/21 2126 20 G Anterior;Right Upper Arm 10 days         Peripheral IV - Single Lumen 12/29/21 1532 20 G Anterior;Left Forearm <1 day                Significant Labs:    CBC/Anemia Profile:  Recent Labs   Lab 12/29/21  0833 12/30/21  0721   WBC 21.75* 17.98*   HGB 6.5* 6.8*   HCT 18.6* 19.6*    322   MCV 86.5 85.6   RDW 17.5* 17.6*        Chemistries:  Recent Labs   Lab 12/29/21  0833 12/30/21  0721   * 131*   K 5.1 6.1*   CL 94* 95*   CO2 17* 13*   BUN 99* 140*   CREATININE 6.51* 7.79*   CALCIUM 7.2* 7.2*       All pertinent labs within the past 24 hours have been reviewed.    Significant Imaging:  I have reviewed all pertinent imaging results/findings within the past 24 hours.    Assessment/Plan:     * Denice gangrene  - s/p multiple debridements  - wound  vac in place  - ID consulted for antibiotic management: He was treated with rocephin, daptomycin, clindamycin. 12/21- change clindamycin to ampicillin and treat for another week  - 12/28 wound vac changed at bedside       Type 2 diabetes mellitus without complication, without long-term current use of insulin  - continue basal bolus    Acute blood loss anemia  - H/H down to 6.8/19.6 today   - will transfuse one unit PRBCs on HD    RAHAT (acute kidney injury)  - nephrology consulted   - no sign of renal recovery.  Continuing with scheduled hemodialysis for this patient.  - tunneled HD line placement 12/30  - plan for HD today with 1PRBC    On mechanically assisted ventilation  - 12/14 intubated   - not doing well with PSV trials. Placed back on AC  - > 15 days intubated,  consider trach placement. Holding ticagrelor for possible trach on monday    Hypertension  - holding antihypertensives                 Raul Hall MD  Pulmonology  [facility]

## 2021-12-30 NOTE — PROGRESS NOTES
[facility]\""  Nephrology  Progress Note    Patient Name: Og Garcia  MRN: [id number]  Admission Date: 12/23/2021  Hospital Length of Stay: 6 days  Attending Provider: Cecil Abernathy DO   Primary Care Physician: Hector Arndt DNP, FNP-C  Principal Problem:Denice gangrene    Consults  Subjective:     Interval History: The patient was not dialyzed today because his catheter came out today    Review of patient's allergies indicates:  No Known Allergies  Current Facility-Administered Medications   Medication Frequency    0.9%  NaCl infusion (for blood administration) Q24H PRN    0.9%  NaCl infusion (for blood administration) Q24H PRN    0.9%  NaCl infusion PRN    acetaminophen tablet 500 mg Q6H PRN    ampicillin-sulbactam (UNASYN) 1.5 g in sodium chloride 0.9 % 100 mL IVPB (MB+) Q12H    dextrose 50% injection 12.5 g PRN    fentaNYL 2500 mcg in D5W 250 mL infusion premix (titrating) (conc: 10 mcg/mL) PRN    glucagon (human recombinant) injection 1 mg PRN    heparin (porcine) injection 4,000 Units PRN    [START ON 12/30/2021] heparin (porcine) injection 5,000 Units Q8H    insulin aspart U-100 injection 1-10 Units Q6H PRN    insulin detemir U-100 injection 45 Units QHS    NORepinephrine 32 mg in dextrose 5 % 250 mL infusion PRN    pantoprazole suspension 40 mg Daily    propofol (DIPRIVAN) 10 mg/mL infusion PRN    sodium chloride 0.9% bolus 250 mL PRN    ticagrelor tablet 90 mg BID       Objective:     Vital Signs (Most Recent):  Temp: 98.9 °F (37.2 °C) (12/29/21 1729)  Pulse: 101 (12/29/21 1845)  Resp: (!) 33 (12/29/21 1845)  BP: (!) 146/69 (12/29/21 1845)  SpO2: 100 % (12/29/21 1845)  O2 Device (Oxygen Therapy): ventilator (12/29/21 2006) Vital Signs (24h Range):  Temp:  [98.6 °F (37 °C)-99.5 °F (37.5 °C)] 98.9 °F (37.2 °C)  Pulse:  [] 101  Resp:  [25-47] 33  SpO2:  [100 %] 100 %  BP: ()/(50-89) 146/69     Weight: 94.7 kg (208 lb 12.4 oz) (12/29/21  0454)  Body mass index is 29.12 kg/m².  Body surface area is 2.18 meters squared.    I/O last 3 completed shifts:  In: 3362.2 [I.V.:632.2; Other:650; NG/GT:1980; IV Piggyback:100]  Out: 2060 [Urine:160; Other:1000; Stool:900]    Physical Exam   Lungs-clear  Cor-no murmur   Abd-soft  Significant Labs:sure  CBC:   Recent Labs   Lab 12/29/21  0833   WBC 21.75*   RBC 2.15*   HGB 6.5*   HCT 18.6*      MCV 86.5   MCH 30.2   MCHC 34.9     CMP:   Recent Labs   Lab 12/24/21  0737 12/27/21  0814 12/29/21  0833   *   < > 189*   CALCIUM 6.4*   < > 7.2*   ALBUMIN 1.0*  --   --    PROT 7.2  --   --    *   < > 131*   K 5.6*   < > 5.1   CO2 17*   < > 17*   CL 84*   < > 94*   *   < > 99*   CREATININE 9.36*   < > 6.51*   ALKPHOS 155*  --   --    ALT 13*  --   --    AST 44*  --   --    BILITOT 2.9*  --   --     < > = values in this interval not displayed.     All labs within the past 24 hours have been reviewed.    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] 12/13/2021 Yes    Acute blood loss anemia [D62] 12/25/2021 No    Type 2 diabetes mellitus without complication, without long-term current use of insulin [E11.9] 12/25/2021 Unknown    Necrotizing fasciitis [M72.6]  Yes    Hypocalcemia [E83.51] 12/16/2021 Yes    RAHAT (acute kidney injury) [N17.9] 12/14/2021 Yes    On mechanically assisted ventilation [Z99.11] 12/14/2021 Not Applicable    Hypertension [I10] 09/02/2021 Yes      Problems Resolved During this Admission:     Plan   Proceed with placement of a tunnel hemodialysis catheter tomorrow  Hemodialysis tomorrow    Thank you for your consult. I will follow-up with patient. Please contact us if you have any additional questions.    Damir Avalos MD  Nephrology  [facility]

## 2021-12-30 NOTE — NURSING
Wound care note: was unable to apply wound vac to patient r/t bright red bleeding from abdomen. Telephoned PER[facility] Molina and Dr. Hui, ordered pressure dressing and abd binder.

## 2021-12-30 NOTE — PLAN OF CARE
Problem: Adult Inpatient Plan of Care  Goal: Plan of Care Review  Outcome: Ongoing, Progressing  Goal: Patient-Specific Goal (Individualized)  Outcome: Ongoing, Progressing  Goal: Optimal Comfort and Wellbeing  Outcome: Ongoing, Progressing     Problem: Fall Injury Risk  Goal: Absence of Fall and Fall-Related Injury  Outcome: Ongoing, Progressing

## 2021-12-30 NOTE — PROGRESS NOTES
General surgery  Called for bleeding from abdominal wound dressing saturated  Packed with surgicel x 2, kerlix covered with 4x4  Abdominal binder  Plans for hemodialysis today HD cath good position on cxr  Washout Saturday per dr moe  Spoke with family member via phone

## 2021-12-30 NOTE — ASSESSMENT & PLAN NOTE
- 12/14 intubated   - not doing well with PSV trials. Placed back on AC  - > 15 days intubated,  consider trach placement. Holding ticagrelor for possible trach on monday

## 2021-12-31 NOTE — ASSESSMENT & PLAN NOTE
- nephrology consulted   - no sign of renal recovery.  Continuing with scheduled hemodialysis for this patient.  - tunneled HD line placement 12/30  - 12/31 - potassium 6.36 - plan for HD again today  with 2 PRBC

## 2021-12-31 NOTE — PLAN OF CARE
Problem: Communication Impairment (Mechanical Ventilation, Invasive)  Goal: Effective Communication  12/31/2021 1020 by Carmel López, RRT  Outcome: Ongoing, Progressing  12/31/2021 1019 by Carmel López, RRT  Outcome: Ongoing, Progressing     Problem: Device-Related Complication Risk (Mechanical Ventilation, Invasive)  Goal: Optimal Device Function  Outcome: Ongoing, Progressing

## 2021-12-31 NOTE — ASSESSMENT & PLAN NOTE
Will take back to the OR tomorrow for exploratory laparotomy and debridement.    Hemoglobin 5.5; patient being transfused 2 units of packed red blood cells currently during dialysis

## 2021-12-31 NOTE — ASSESSMENT & PLAN NOTE
- H/H down to 5.5/16 - pt had bleeding from his HD insertion site yesterday this has now resolved.   - will transfuse 2 units PRBCs on HD

## 2021-12-31 NOTE — SUBJECTIVE & OBJECTIVE
Interval History: sedated. HD line placed yesterday. Oozing from HD line stopped. Hgb 5.5, will transfuse on HD today.     Objective:     Vital Signs (Most Recent):  Temp: 99.3 °F (37.4 °C) (12/31/21 0800)  Pulse: 101 (12/31/21 0926)  Resp: (!) 30 (12/31/21 0926)  BP: (!) 93/52 (12/31/21 0926)  SpO2: 100 % (12/31/21 0926) Vital Signs (24h Range):  Temp:  [96.8 °F (36 °C)-100 °F (37.8 °C)] 99.3 °F (37.4 °C)  Pulse:  [] 101  Resp:  [10-48] 30  SpO2:  [98 %-100 %] 100 %  BP: ()/(47-77) 93/52     Weight: 95 kg (209 lb 7 oz)  Body mass index is 29.21 kg/m².      Intake/Output Summary (Last 24 hours) at 12/31/2021 0936  Last data filed at 12/31/2021 0810  Gross per 24 hour   Intake 2115 ml   Output 1800 ml   Net 315 ml       Physical Exam  Vitals reviewed.   Constitutional:       Appearance: He is ill-appearing.      Comments: Intubated and sedated   HENT:      Head:      Comments: Right temporal deformity     Nose: Nose normal.      Mouth/Throat:      Mouth: Mucous membranes are moist.   Eyes:      Conjunctiva/sclera: Conjunctivae normal.      Pupils: Pupils are equal, round, and reactive to light.   Cardiovascular:      Rate and Rhythm: Normal rate and regular rhythm.      Pulses: Normal pulses.      Heart sounds: Normal heart sounds.   Pulmonary:      Effort: No respiratory distress.      Breath sounds: No stridor. No wheezing, rhonchi or rales.      Comments: Ventilator driven breath sounds bilaterally  Abdominal:      Palpations: Abdomen is soft.      Tenderness: There is no guarding.      Comments: Anterior abdominal wall with wound vac in place. Ostomy   Genitourinary:     Comments: Packing in place  Musculoskeletal:         General: No swelling or deformity.      Cervical back: Neck supple.      Right lower leg: No edema.      Left lower leg: No edema.   Lymphadenopathy:      Cervical: No cervical adenopathy.   Skin:     General: Skin is warm and dry.   Neurological:      Cranial Nerves: No cranial  nerve deficit.      Comments: Intubated and sedated         Vents:  Vent Mode: A/C (12/31/21 0430)  Set Rate: 16 BPM (12/31/21 0430)  Vt Set: 480 mL (12/31/21 0430)  Pressure Support: 12 cmH20 (12/29/21 0036)  PEEP/CPAP: 5 cmH20 (12/31/21 0430)  Oxygen Concentration (%): 50 (12/31/21 0810)  Peak Airway Pressure: 29 cmH2O (12/31/21 0430)  Total Ve: 13.1 mL (12/31/21 0430)  F/VT Ratio<105 (RSBI): (!) 85.16 (12/30/21 1700)    Lines/Drains/Airways     Drain                 NG/OG Tube [place] sump 18 Fr. Left nostril -- days         Urethral Catheter 12/13/21 2130 16 Fr. 17 days         Colostomy 12/18/21 1030 Descending/sigmoid LUQ 12 days          Airway                 Airway - Non-Surgical 12/14/21 1047 16 days          Peripheral Intravenous Line                 Peripheral IV - Single Lumen 12/19/21 2126 20 G Anterior;Right Upper Arm 11 days         Peripheral IV - Single Lumen 12/29/21 1532 20 G Anterior;Left Forearm 1 day                Significant Labs:    CBC/Anemia Profile:  Recent Labs   Lab 12/30/21 0721 12/31/21 0824   WBC 17.98* 17.07*   HGB 6.8* 5.5*   HCT 19.6* 16.0*    309   MCV 85.6 85.6   RDW 17.6* 17.2*        Chemistries:  Recent Labs   Lab 12/30/21 0721 12/31/21  0824   * 134*   K 6.1* 6.3*   CL 95* 96*   CO2 13* 14*   * 116*   CREATININE 7.79* 6.36*   CALCIUM 7.2* 6.8*       All pertinent labs within the past 24 hours have been reviewed.    Significant Imaging:  I have reviewed all pertinent imaging results/findings within the past 24 hours.

## 2021-12-31 NOTE — SUBJECTIVE & OBJECTIVE
Interval History:  Stable no acute events overnight.  No shadowing on dressing from pressure dressing applied yesterday    Medications:  Continuous Infusions:  Scheduled Meds:   ampicillin-sulbactim (UNASYN) IVPB  1.5 g Intravenous Q12H    calcium gluconate IVPB  1 g Intravenous Once    insulin detemir U-100  45 Units Subcutaneous QHS    pantoprazole  40 mg Per NG tube Daily     PRN Meds:sodium chloride, sodium chloride 0.9%, acetaminophen, dextrose 50%, fentanyl, glucagon (human recombinant), heparin (porcine), insulin aspart U-100, NORepinephrine bitartrate-D5W, propofoL, sodium chloride 0.9%     Review of patient's allergies indicates:  No Known Allergies  Objective:     Vital Signs (Most Recent):  Temp: 99.3 °F (37.4 °C) (12/31/21 0948)  Pulse: 96 (12/31/21 1115)  Resp: (!) 25 (12/31/21 1115)  BP: (!) 109/57 (12/31/21 1115)  SpO2: 100 % (12/31/21 1115) Vital Signs (24h Range):  Temp:  [96.8 °F (36 °C)-100 °F (37.8 °C)] 99.3 °F (37.4 °C)  Pulse:  [] 96  Resp:  [10-48] 25  SpO2:  [98 %-100 %] 100 %  BP: ()/(47-77) 109/57     Weight: 95 kg (209 lb 7 oz)  Body mass index is 29.21 kg/m².    Intake/Output - Last 3 Shifts       12/29 0700 12/30 0659 12/30 0700 12/31 0659 12/31 0700  01/01 0659    I.V. (mL/kg) 432.2 (4.6) 100 (1.1)     Blood  360 1050    Other 150      NG/GT 1325 1470 185    IV Piggyback 100      Total Intake(mL/kg) 2007.2 (21.2) 1930 (20.3) 1235 (13)    Urine (mL/kg/hr) 135 (0.1) 150 (0.1)     Other  1000     Stool 650 650     Total Output 785 1800     Net +1222.2 +130 +1235                 Physical Exam  Vitals reviewed.   Constitutional:       General: He is in acute distress.      Appearance: He is ill-appearing.   HENT:      Head: Normocephalic.   Cardiovascular:      Rate and Rhythm: Normal rate.   Pulmonary:      Effort: Pulmonary effort is normal. No respiratory distress.      Comments: Per ventilator  Abdominal:      General: There is no distension.      Tenderness: There is  no abdominal tenderness.      Comments: Abdominal wound and dressing intact, no significant shadowing.  Abdominal binder in place    Skin:     General: Skin is warm.      Coloration: Skin is not jaundiced.   Neurological:      Mental Status: Mental status is at baseline.      Cranial Nerves: Cranial nerve deficit present.         Significant Labs:  I have reviewed all pertinent lab results within the past 24 hours.  CBC:   Recent Labs   Lab 12/31/21  0824   WBC 17.07*   RBC 1.87*   HGB 5.5*   HCT 16.0*      MCV 85.6   MCH 29.4   MCHC 34.4     BMP:   Recent Labs   Lab 12/31/21  0824   *   *   K 6.3*   CL 96*   CO2 14*   *   CREATININE 6.36*   CALCIUM 6.8*       Significant Diagnostics:  I have reviewed all pertinent imaging results/findings within the past 24 hours.

## 2021-12-31 NOTE — PROGRESS NOTES
[facility]  General Surgery  Progress Note    Subjective:     History of Present Illness:  No notes on file    Post-Op Info:  * No surgery found *         Interval History:  Stable no acute events overnight.  No shadowing on dressing from pressure dressing applied yesterday    Medications:  Continuous Infusions:  Scheduled Meds:   ampicillin-sulbactim (UNASYN) IVPB  1.5 g Intravenous Q12H    calcium gluconate IVPB  1 g Intravenous Once    insulin detemir U-100  45 Units Subcutaneous QHS    pantoprazole  40 mg Per NG tube Daily     PRN Meds:sodium chloride, sodium chloride 0.9%, acetaminophen, dextrose 50%, fentanyl, glucagon (human recombinant), heparin (porcine), insulin aspart U-100, NORepinephrine bitartrate-D5W, propofoL, sodium chloride 0.9%     Review of patient's allergies indicates:  No Known Allergies  Objective:     Vital Signs (Most Recent):  Temp: 99.3 °F (37.4 °C) (12/31/21 0948)  Pulse: 96 (12/31/21 1115)  Resp: (!) 25 (12/31/21 1115)  BP: (!) 109/57 (12/31/21 1115)  SpO2: 100 % (12/31/21 1115) Vital Signs (24h Range):  Temp:  [96.8 °F (36 °C)-100 °F (37.8 °C)] 99.3 °F (37.4 °C)  Pulse:  [] 96  Resp:  [10-48] 25  SpO2:  [98 %-100 %] 100 %  BP: ()/(47-77) 109/57     Weight: 95 kg (209 lb 7 oz)  Body mass index is 29.21 kg/m².    Intake/Output - Last 3 Shifts       12/29 0700  12/30 0659 12/30 0700  12/31 0659 12/31 0700  01/01 0659    I.V. (mL/kg) 432.2 (4.6) 100 (1.1)     Blood  360 1050    Other 150      NG/GT 1325 1470 185    IV Piggyback 100      Total Intake(mL/kg) 2007.2 (21.2) 1930 (20.3) 1235 (13)    Urine (mL/kg/hr) 135 (0.1) 150 (0.1)     Other  1000     Stool 650 650     Total Output 785 1800     Net +1222.2 +130 +1235                 Physical Exam  Vitals reviewed.   Constitutional:       General: He is in acute distress.      Appearance: He is ill-appearing.   HENT:      Head: Normocephalic.   Cardiovascular:      Rate and Rhythm: Normal rate.    Pulmonary:      Effort: Pulmonary effort is normal. No respiratory distress.      Comments: Per ventilator  Abdominal:      General: There is no distension.      Tenderness: There is no abdominal tenderness.      Comments: Abdominal wound and dressing intact, no significant shadowing.  Abdominal binder in place    Skin:     General: Skin is warm.      Coloration: Skin is not jaundiced.   Neurological:      Mental Status: Mental status is at baseline.      Cranial Nerves: Cranial nerve deficit present.         Significant Labs:  I have reviewed all pertinent lab results within the past 24 hours.  CBC:   Recent Labs   Lab 12/31/21  0824   WBC 17.07*   RBC 1.87*   HGB 5.5*   HCT 16.0*      MCV 85.6   MCH 29.4   MCHC 34.4     BMP:   Recent Labs   Lab 12/31/21  0824   *   *   K 6.3*   CL 96*   CO2 14*   *   CREATININE 6.36*   CALCIUM 6.8*       Significant Diagnostics:  I have reviewed all pertinent imaging results/findings within the past 24 hours.    Assessment/Plan:     * Denice gangrene  Will take back to the OR tomorrow for exploratory laparotomy and debridement.    Hemoglobin 5.5; patient being transfused 2 units of packed red blood cells currently during dialysis        Evaristo Mackya, DO  General Surgery  [facility]

## 2021-12-31 NOTE — PROGRESS NOTES
[facility]  Pulmonology  Progress Note    Patient Name: Og Garcia  MRN: [id number]  Admission Date: 12/23/2021  Hospital Length of Stay: 8 days  Code Status: Prior  Attending Provider: Cecil Abernathy DO  Primary Care Provider: Hector Arndt DNP, FNP-C   Principal Problem: Denice gangrene    Subjective:     Interval History: sedated. HD line placed yesterday. Oozing from HD line stopped. Hgb 5.5, will transfuse on HD today.     Objective:     Vital Signs (Most Recent):  Temp: 99.3 °F (37.4 °C) (12/31/21 0800)  Pulse: 101 (12/31/21 0926)  Resp: (!) 30 (12/31/21 0926)  BP: (!) 93/52 (12/31/21 0926)  SpO2: 100 % (12/31/21 0926) Vital Signs (24h Range):  Temp:  [96.8 °F (36 °C)-100 °F (37.8 °C)] 99.3 °F (37.4 °C)  Pulse:  [] 101  Resp:  [10-48] 30  SpO2:  [98 %-100 %] 100 %  BP: ()/(47-77) 93/52     Weight: 95 kg (209 lb 7 oz)  Body mass index is 29.21 kg/m².      Intake/Output Summary (Last 24 hours) at 12/31/2021 0936  Last data filed at 12/31/2021 0810  Gross per 24 hour   Intake 2115 ml   Output 1800 ml   Net 315 ml       Physical Exam  Vitals reviewed.   Constitutional:       Appearance: He is ill-appearing.      Comments: Intubated and sedated   HENT:      Head:      Comments: Right temporal deformity     Nose: Nose normal.      Mouth/Throat:      Mouth: Mucous membranes are moist.   Eyes:      Conjunctiva/sclera: Conjunctivae normal.      Pupils: Pupils are equal, round, and reactive to light.   Cardiovascular:      Rate and Rhythm: Normal rate and regular rhythm.      Pulses: Normal pulses.      Heart sounds: Normal heart sounds.   Pulmonary:      Effort: No respiratory distress.      Breath sounds: No stridor. No wheezing, rhonchi or rales.      Comments: Ventilator driven breath sounds bilaterally  Abdominal:      Palpations: Abdomen is soft.      Tenderness: There is no guarding.      Comments: Anterior abdominal wall with wound vac in place. Ostomy    Genitourinary:     Comments: Packing in place  Musculoskeletal:         General: No swelling or deformity.      Cervical back: Neck supple.      Right lower leg: No edema.      Left lower leg: No edema.   Lymphadenopathy:      Cervical: No cervical adenopathy.   Skin:     General: Skin is warm and dry.   Neurological:      Cranial Nerves: No cranial nerve deficit.      Comments: Intubated and sedated         Vents:  Vent Mode: A/C (12/31/21 0430)  Set Rate: 16 BPM (12/31/21 0430)  Vt Set: 480 mL (12/31/21 0430)  Pressure Support: 12 cmH20 (12/29/21 0036)  PEEP/CPAP: 5 cmH20 (12/31/21 0430)  Oxygen Concentration (%): 50 (12/31/21 0810)  Peak Airway Pressure: 29 cmH2O (12/31/21 0430)  Total Ve: 13.1 mL (12/31/21 0430)  F/VT Ratio<105 (RSBI): (!) 85.16 (12/30/21 1700)    Lines/Drains/Airways     Drain                 NG/OG Tube [place] sump 18 Fr. Left nostril -- days         Urethral Catheter 12/13/21 2130 16 Fr. 17 days         Colostomy 12/18/21 1030 Descending/sigmoid LUQ 12 days          Airway                 Airway - Non-Surgical 12/14/21 1047 16 days          Peripheral Intravenous Line                 Peripheral IV - Single Lumen 12/19/21 2126 20 G Anterior;Right Upper Arm 11 days         Peripheral IV - Single Lumen 12/29/21 1532 20 G Anterior;Left Forearm 1 day                Significant Labs:    CBC/Anemia Profile:  Recent Labs   Lab 12/30/21  0721 12/31/21  0824   WBC 17.98* 17.07*   HGB 6.8* 5.5*   HCT 19.6* 16.0*    309   MCV 85.6 85.6   RDW 17.6* 17.2*        Chemistries:  Recent Labs   Lab 12/30/21  0721 12/31/21  0824   * 134*   K 6.1* 6.3*   CL 95* 96*   CO2 13* 14*   * 116*   CREATININE 7.79* 6.36*   CALCIUM 7.2* 6.8*       All pertinent labs within the past 24 hours have been reviewed.    Significant Imaging:  I have reviewed all pertinent imaging results/findings within the past 24 hours.    Assessment/Plan:     * Denice gangrene  - s/p multiple debridements  - wound vac in  place  - ID consulted for antibiotic management: He was treated with rocephin, daptomycin, clindamycin. 12/21- change clindamycin to ampicillin and treat for another week  - 12/28 wound vac changed at bedside       Type 2 diabetes mellitus without complication, without long-term current use of insulin  - continue basal bolus  - fasting 169     Acute blood loss anemia  - H/H down to 5.5/16 - pt had bleeding from his HD insertion site yesterday this has now resolved.   - will transfuse 2 units PRBCs on HD    Hypocalcemia  12/31 - 6.8 today - replace IV      RAHAT (acute kidney injury)  - nephrology consulted   - no sign of renal recovery.  Continuing with scheduled hemodialysis for this patient.  - tunneled HD line placement 12/30  - 12/31 - potassium 6.36 - plan for HD again today  with 2 PRBC      On mechanically assisted ventilation  - 12/14 intubated   - not doing well with PSV trials. Placed back on AC  - > 15 days intubated,  consider trach placement. Holding ticagrelor for possible trach on monday                 FLORESITA Shafer-ACNP  Pulmonology  [facility]

## 2021-12-31 NOTE — DIALYSIS ROUNDING
Mr. Garcia is seen during his hemodialysis. BP is fragile. He received PRBC's with dialysis today.

## 2022-01-01 ENCOUNTER — ANESTHESIA (OUTPATIENT)
Dept: SURGERY | Facility: HOSPITAL | Age: 67
End: 2022-01-01
Payer: MEDICARE

## 2022-01-01 ENCOUNTER — ANESTHESIA EVENT (OUTPATIENT)
Dept: SURGERY | Facility: HOSPITAL | Age: 67
End: 2022-01-01
Payer: MEDICARE

## 2022-01-01 ENCOUNTER — APPOINTMENT (OUTPATIENT)
Dept: RADIOLOGY | Facility: HOSPITAL | Age: 67
End: 2022-01-01
Attending: INTERNAL MEDICINE
Payer: MEDICARE

## 2022-01-01 ENCOUNTER — HOSPITAL ENCOUNTER (OUTPATIENT)
Facility: HOSPITAL | Age: 67
Discharge: NURSING, REHAB OR LONG-TERM CARE | End: 2022-02-21
Attending: SURGERY | Admitting: SURGERY
Payer: MEDICARE

## 2022-01-01 ENCOUNTER — HOSPITAL ENCOUNTER (OUTPATIENT)
Facility: HOSPITAL | Age: 67
Discharge: NURSING, REHAB OR LONG-TERM CARE | End: 2022-01-12
Attending: SURGERY | Admitting: SURGERY
Payer: MEDICARE

## 2022-01-01 ENCOUNTER — HOSPITAL ENCOUNTER (OUTPATIENT)
Facility: HOSPITAL | Age: 67
Discharge: NURSING, REHAB OR LONG-TERM CARE | End: 2022-05-20
Attending: SURGERY | Admitting: SURGERY
Payer: MEDICARE

## 2022-01-01 ENCOUNTER — HOSPITAL ENCOUNTER (OUTPATIENT)
Facility: HOSPITAL | Age: 67
Discharge: NURSING, REHAB OR LONG-TERM CARE | End: 2022-04-13
Attending: SURGERY | Admitting: SURGERY
Payer: MEDICARE

## 2022-01-01 ENCOUNTER — HOSPITAL ENCOUNTER (OUTPATIENT)
Facility: HOSPITAL | Age: 67
Discharge: NURSING, REHAB OR LONG-TERM CARE | End: 2022-03-25
Attending: SURGERY | Admitting: SURGERY
Payer: MEDICARE

## 2022-01-01 ENCOUNTER — HOSPITAL ENCOUNTER (OUTPATIENT)
Facility: HOSPITAL | Age: 67
Discharge: NURSING, REHAB OR LONG-TERM CARE | End: 2022-01-10
Attending: SURGERY | Admitting: SURGERY
Payer: MEDICARE

## 2022-01-01 ENCOUNTER — APPOINTMENT (OUTPATIENT)
Dept: RADIOLOGY | Facility: HOSPITAL | Age: 67
End: 2022-01-01
Payer: MEDICARE

## 2022-01-01 ENCOUNTER — HOSPITAL ENCOUNTER (OUTPATIENT)
Facility: HOSPITAL | Age: 67
Discharge: NURSING, REHAB OR LONG-TERM CARE | End: 2022-03-09
Attending: SURGERY | Admitting: SURGERY
Payer: MEDICARE

## 2022-01-01 ENCOUNTER — HOSPITAL ENCOUNTER (OUTPATIENT)
Facility: HOSPITAL | Age: 67
Discharge: NURSING, REHAB OR LONG-TERM CARE | End: 2022-01-07
Attending: SURGERY | Admitting: SURGERY
Payer: MEDICARE

## 2022-01-01 ENCOUNTER — HOSPITAL ENCOUNTER (OUTPATIENT)
Facility: HOSPITAL | Age: 67
Discharge: NURSING, REHAB OR LONG-TERM CARE | End: 2022-06-10
Attending: SURGERY | Admitting: SURGERY
Payer: MEDICARE

## 2022-01-01 VITALS
SYSTOLIC BLOOD PRESSURE: 148 MMHG | HEART RATE: 100 BPM | TEMPERATURE: 97 F | OXYGEN SATURATION: 100 % | RESPIRATION RATE: 14 BRPM | DIASTOLIC BLOOD PRESSURE: 83 MMHG

## 2022-01-01 VITALS
RESPIRATION RATE: 24 BRPM | HEART RATE: 88 BPM | TEMPERATURE: 97 F | OXYGEN SATURATION: 100 % | DIASTOLIC BLOOD PRESSURE: 55 MMHG | SYSTOLIC BLOOD PRESSURE: 115 MMHG

## 2022-01-01 VITALS
TEMPERATURE: 99 F | DIASTOLIC BLOOD PRESSURE: 74 MMHG | HEART RATE: 105 BPM | RESPIRATION RATE: 22 BRPM | OXYGEN SATURATION: 100 % | SYSTOLIC BLOOD PRESSURE: 154 MMHG

## 2022-01-01 VITALS
HEART RATE: 86 BPM | DIASTOLIC BLOOD PRESSURE: 79 MMHG | RESPIRATION RATE: 20 BRPM | OXYGEN SATURATION: 100 % | TEMPERATURE: 99 F | SYSTOLIC BLOOD PRESSURE: 120 MMHG

## 2022-01-01 VITALS
TEMPERATURE: 98 F | DIASTOLIC BLOOD PRESSURE: 69 MMHG | RESPIRATION RATE: 32 BRPM | SYSTOLIC BLOOD PRESSURE: 143 MMHG | HEART RATE: 89 BPM | OXYGEN SATURATION: 100 %

## 2022-01-01 VITALS
SYSTOLIC BLOOD PRESSURE: 125 MMHG | TEMPERATURE: 97 F | HEART RATE: 99 BPM | OXYGEN SATURATION: 100 % | HEART RATE: 102 BPM | DIASTOLIC BLOOD PRESSURE: 60 MMHG | DIASTOLIC BLOOD PRESSURE: 69 MMHG | RESPIRATION RATE: 25 BRPM | SYSTOLIC BLOOD PRESSURE: 100 MMHG | OXYGEN SATURATION: 100 %

## 2022-01-01 VITALS
RESPIRATION RATE: 16 BRPM | TEMPERATURE: 101 F | SYSTOLIC BLOOD PRESSURE: 102 MMHG | OXYGEN SATURATION: 97 % | DIASTOLIC BLOOD PRESSURE: 40 MMHG | HEART RATE: 71 BPM

## 2022-01-01 VITALS
SYSTOLIC BLOOD PRESSURE: 120 MMHG | HEART RATE: 96 BPM | RESPIRATION RATE: 14 BRPM | DIASTOLIC BLOOD PRESSURE: 42 MMHG | TEMPERATURE: 99 F | OXYGEN SATURATION: 100 %

## 2022-01-01 VITALS
OXYGEN SATURATION: 100 % | RESPIRATION RATE: 12 BRPM | HEART RATE: 107 BPM | SYSTOLIC BLOOD PRESSURE: 97 MMHG | DIASTOLIC BLOOD PRESSURE: 55 MMHG | TEMPERATURE: 98 F

## 2022-01-01 DIAGNOSIS — M72.6 NECROTIZING FASCIITIS: ICD-10-CM

## 2022-01-01 DIAGNOSIS — N49.3 FOURNIER GANGRENE: ICD-10-CM

## 2022-01-01 DIAGNOSIS — M72.6 NECROTIZING FASCIITIS: Primary | ICD-10-CM

## 2022-01-01 DIAGNOSIS — I73.9 PERIPHERAL VASCULAR DISEASE: ICD-10-CM

## 2022-01-01 DIAGNOSIS — Z71.89 COMPLEX CARE COORDINATION: ICD-10-CM

## 2022-01-01 DIAGNOSIS — T14.8XXA POST-TRAUMATIC WOUND INFECTION: ICD-10-CM

## 2022-01-01 DIAGNOSIS — L08.9 POST-TRAUMATIC WOUND INFECTION: ICD-10-CM

## 2022-01-01 DIAGNOSIS — Z99.11 ON MECHANICALLY ASSISTED VENTILATION: Primary | ICD-10-CM

## 2022-01-01 LAB
ABO + RH BLD: NORMAL
ACANTHOCYTES BLD QL SMEAR: ABNORMAL
ALBUMIN SERPL BCP-MCNC: 0.5 G/DL (ref 3.5–5)
ALBUMIN SERPL BCP-MCNC: 0.6 G/DL (ref 3.5–5)
ALBUMIN SERPL BCP-MCNC: 0.6 G/DL (ref 3.5–5)
ALBUMIN SERPL BCP-MCNC: 0.8 G/DL (ref 3.5–5)
ALBUMIN SERPL BCP-MCNC: 0.9 G/DL (ref 3.5–5)
ALBUMIN SERPL BCP-MCNC: 1 G/DL (ref 3.5–5)
ALBUMIN SERPL BCP-MCNC: 1.3 G/DL (ref 3.5–5)
ALBUMIN SERPL BCP-MCNC: 1.4 G/DL (ref 3.5–5)
ALBUMIN/GLOB SERPL: 0.1 {RATIO}
ALBUMIN/GLOB SERPL: 0.1 {RATIO}
ALBUMIN/GLOB SERPL: 0.2 {RATIO}
ALP SERPL-CCNC: 234 U/L (ref 45–115)
ALP SERPL-CCNC: 278 U/L (ref 45–115)
ALP SERPL-CCNC: 290 U/L (ref 45–115)
ALP SERPL-CCNC: 291 U/L (ref 45–115)
ALP SERPL-CCNC: 310 U/L (ref 45–115)
ALP SERPL-CCNC: 337 U/L (ref 45–115)
ALP SERPL-CCNC: 349 U/L (ref 45–115)
ALP SERPL-CCNC: 369 U/L (ref 45–115)
ALP SERPL-CCNC: 490 U/L (ref 45–115)
ALP SERPL-CCNC: 631 U/L (ref 45–115)
ALT SERPL W P-5'-P-CCNC: 23 U/L (ref 16–61)
ALT SERPL W P-5'-P-CCNC: 35 U/L (ref 16–61)
ALT SERPL W P-5'-P-CCNC: 39 U/L (ref 16–61)
ALT SERPL W P-5'-P-CCNC: 44 U/L (ref 16–61)
ALT SERPL W P-5'-P-CCNC: 55 U/L (ref 16–61)
ALT SERPL W P-5'-P-CCNC: 75 U/L (ref 16–61)
ANION GAP SERPL CALCULATED.3IONS-SCNC: 12 MMOL/L (ref 7–16)
ANION GAP SERPL CALCULATED.3IONS-SCNC: 12 MMOL/L (ref 7–16)
ANION GAP SERPL CALCULATED.3IONS-SCNC: 13 MMOL/L (ref 7–16)
ANION GAP SERPL CALCULATED.3IONS-SCNC: 14 MMOL/L (ref 7–16)
ANION GAP SERPL CALCULATED.3IONS-SCNC: 15 MMOL/L (ref 7–16)
ANION GAP SERPL CALCULATED.3IONS-SCNC: 16 MMOL/L (ref 7–16)
ANION GAP SERPL CALCULATED.3IONS-SCNC: 17 MMOL/L (ref 7–16)
ANION GAP SERPL CALCULATED.3IONS-SCNC: 18 MMOL/L (ref 7–16)
ANION GAP SERPL CALCULATED.3IONS-SCNC: 19 MMOL/L (ref 7–16)
ANION GAP SERPL CALCULATED.3IONS-SCNC: 20 MMOL/L (ref 7–16)
ANION GAP SERPL CALCULATED.3IONS-SCNC: 22 MMOL/L (ref 7–16)
ANION GAP SERPL CALCULATED.3IONS-SCNC: 23 MMOL/L (ref 7–16)
ANION GAP SERPL CALCULATED.3IONS-SCNC: 24 MMOL/L (ref 7–16)
ANION GAP SERPL CALCULATED.3IONS-SCNC: 26 MMOL/L (ref 7–16)
ANION GAP SERPL CALCULATED.3IONS-SCNC: 26 MMOL/L (ref 7–16)
ANION GAP SERPL CALCULATED.3IONS-SCNC: 29 MMOL/L (ref 7–16)
ANISOCYTOSIS BLD QL SMEAR: ABNORMAL
ANTIBODY IDENTIFICATION: NORMAL
AORTIC ROOT ANNULUS: 3.3 CM
AORTIC VALVE CUSP SEPERATION: 23.6 CM
AST SERPL W P-5'-P-CCNC: 104 U/L (ref 15–37)
AST SERPL W P-5'-P-CCNC: 34 U/L (ref 15–37)
AST SERPL W P-5'-P-CCNC: 36 U/L (ref 15–37)
AST SERPL W P-5'-P-CCNC: 48 U/L (ref 15–37)
AST SERPL W P-5'-P-CCNC: 61 U/L (ref 15–37)
AST SERPL W P-5'-P-CCNC: 65 U/L (ref 15–37)
ATYPICAL LYMPHOCYTES: ABNORMAL
AV INDEX (PROSTH): 0.8
AV MEAN GRADIENT: 3 MMHG
AV PEAK GRADIENT: 4 MMHG
AV VALVE AREA: 2.51 CM2
AV VELOCITY RATIO: 0.8
BACTERIA BLD CULT: ABNORMAL
BACTERIA BLD CULT: NORMAL
BACTERIA SPEC ANAEROBE CULT: NORMAL
BASO STIPL BLD QL SMEAR: ABNORMAL
BASO STIPL BLD QL SMEAR: ABNORMAL
BASOPHILS # BLD AUTO: 0.01 K/UL (ref 0–0.2)
BASOPHILS # BLD AUTO: 0.02 K/UL (ref 0–0.2)
BASOPHILS # BLD AUTO: 0.03 K/UL (ref 0–0.2)
BASOPHILS # BLD AUTO: 0.03 K/UL (ref 0–0.2)
BASOPHILS # BLD AUTO: 0.04 K/UL (ref 0–0.2)
BASOPHILS # BLD AUTO: 0.05 K/UL (ref 0–0.2)
BASOPHILS # BLD AUTO: 0.06 K/UL (ref 0–0.2)
BASOPHILS # BLD AUTO: 0.07 K/UL (ref 0–0.2)
BASOPHILS # BLD AUTO: 0.08 K/UL (ref 0–0.2)
BASOPHILS # BLD AUTO: 0.09 K/UL (ref 0–0.2)
BASOPHILS # BLD AUTO: 0.1 K/UL (ref 0–0.2)
BASOPHILS # BLD AUTO: 0.11 K/UL (ref 0–0.2)
BASOPHILS # BLD AUTO: 0.15 K/UL (ref 0–0.2)
BASOPHILS NFR BLD AUTO: 0.1 % (ref 0–1)
BASOPHILS NFR BLD AUTO: 0.2 % (ref 0–1)
BASOPHILS NFR BLD AUTO: 0.3 % (ref 0–1)
BASOPHILS NFR BLD AUTO: 0.4 % (ref 0–1)
BASOPHILS NFR BLD AUTO: 0.5 % (ref 0–1)
BASOPHILS NFR BLD AUTO: 0.6 % (ref 0–1)
BASOPHILS NFR BLD AUTO: 0.7 % (ref 0–1)
BASOPHILS NFR BLD MANUAL: 1 % (ref 0–1)
BILIRUB SERPL-MCNC: 0.2 MG/DL (ref 0–1.2)
BILIRUB SERPL-MCNC: 0.4 MG/DL (ref 0–1.2)
BILIRUB SERPL-MCNC: 0.5 MG/DL (ref 0–1.2)
BILIRUB SERPL-MCNC: 0.9 MG/DL (ref 0–1.2)
BILIRUB SERPL-MCNC: 1 MG/DL (ref 0–1.2)
BILIRUB SERPL-MCNC: 1.1 MG/DL (ref 0–1.2)
BILIRUB SERPL-MCNC: 1.2 MG/DL (ref 0–1.2)
BILIRUB SERPL-MCNC: 1.2 MG/DL (ref 0–1.2)
BLD PROD TYP BPU: NORMAL
BLOOD UNIT EXPIRATION DATE: NORMAL
BLOOD UNIT TYPE CODE: 5100
BLOOD UNIT TYPE CODE: 9500
BSA FOR ECHO PROCEDURE: 2.12 M2
BUN SERPL-MCNC: 101 MG/DL (ref 7–18)
BUN SERPL-MCNC: 108 MG/DL (ref 7–18)
BUN SERPL-MCNC: 115 MG/DL (ref 7–18)
BUN SERPL-MCNC: 117 MG/DL (ref 7–18)
BUN SERPL-MCNC: 17 MG/DL (ref 7–18)
BUN SERPL-MCNC: 24 MG/DL (ref 7–18)
BUN SERPL-MCNC: 28 MG/DL (ref 7–18)
BUN SERPL-MCNC: 33 MG/DL (ref 7–18)
BUN SERPL-MCNC: 36 MG/DL (ref 7–18)
BUN SERPL-MCNC: 38 MG/DL (ref 7–18)
BUN SERPL-MCNC: 40 MG/DL (ref 7–18)
BUN SERPL-MCNC: 43 MG/DL (ref 7–18)
BUN SERPL-MCNC: 44 MG/DL (ref 7–18)
BUN SERPL-MCNC: 45 MG/DL (ref 7–18)
BUN SERPL-MCNC: 47 MG/DL (ref 7–18)
BUN SERPL-MCNC: 47 MG/DL (ref 7–18)
BUN SERPL-MCNC: 48 MG/DL (ref 7–18)
BUN SERPL-MCNC: 48 MG/DL (ref 7–18)
BUN SERPL-MCNC: 49 MG/DL (ref 7–18)
BUN SERPL-MCNC: 50 MG/DL (ref 7–18)
BUN SERPL-MCNC: 53 MG/DL (ref 7–18)
BUN SERPL-MCNC: 53 MG/DL (ref 7–18)
BUN SERPL-MCNC: 56 MG/DL (ref 7–18)
BUN SERPL-MCNC: 57 MG/DL (ref 7–18)
BUN SERPL-MCNC: 58 MG/DL (ref 7–18)
BUN SERPL-MCNC: 58 MG/DL (ref 7–18)
BUN SERPL-MCNC: 59 MG/DL (ref 7–18)
BUN SERPL-MCNC: 59 MG/DL (ref 7–18)
BUN SERPL-MCNC: 60 MG/DL (ref 7–18)
BUN SERPL-MCNC: 60 MG/DL (ref 7–18)
BUN SERPL-MCNC: 62 MG/DL (ref 7–18)
BUN SERPL-MCNC: 65 MG/DL (ref 7–18)
BUN SERPL-MCNC: 65 MG/DL (ref 7–18)
BUN SERPL-MCNC: 69 MG/DL (ref 7–18)
BUN SERPL-MCNC: 71 MG/DL (ref 7–18)
BUN SERPL-MCNC: 71 MG/DL (ref 7–18)
BUN SERPL-MCNC: 72 MG/DL (ref 7–18)
BUN SERPL-MCNC: 72 MG/DL (ref 7–18)
BUN SERPL-MCNC: 73 MG/DL (ref 7–18)
BUN SERPL-MCNC: 74 MG/DL (ref 7–18)
BUN SERPL-MCNC: 75 MG/DL (ref 7–18)
BUN SERPL-MCNC: 75 MG/DL (ref 7–18)
BUN SERPL-MCNC: 76 MG/DL (ref 7–18)
BUN SERPL-MCNC: 76 MG/DL (ref 7–18)
BUN SERPL-MCNC: 78 MG/DL (ref 7–18)
BUN SERPL-MCNC: 80 MG/DL (ref 7–18)
BUN SERPL-MCNC: 81 MG/DL (ref 7–18)
BUN SERPL-MCNC: 88 MG/DL (ref 7–18)
BUN SERPL-MCNC: 89 MG/DL (ref 7–18)
BUN SERPL-MCNC: 96 MG/DL (ref 7–18)
BUN SERPL-MCNC: 97 MG/DL (ref 7–18)
BUN/CREAT SERPL: 10 (ref 6–20)
BUN/CREAT SERPL: 11 (ref 6–20)
BUN/CREAT SERPL: 12 (ref 6–20)
BUN/CREAT SERPL: 13 (ref 6–20)
BUN/CREAT SERPL: 14 (ref 6–20)
BUN/CREAT SERPL: 15 (ref 6–20)
BUN/CREAT SERPL: 16 (ref 6–20)
BUN/CREAT SERPL: 16 (ref 6–20)
BUN/CREAT SERPL: 17 (ref 6–20)
BUN/CREAT SERPL: 18 (ref 6–20)
BUN/CREAT SERPL: 19 (ref 6–20)
BUN/CREAT SERPL: 20 (ref 6–20)
BUN/CREAT SERPL: 21 (ref 6–20)
BUN/CREAT SERPL: 22 (ref 6–20)
BUN/CREAT SERPL: 23 (ref 6–20)
BUN/CREAT SERPL: 24 (ref 6–20)
BUN/CREAT SERPL: 24 (ref 6–20)
BUN/CREAT SERPL: 25 (ref 6–20)
BUN/CREAT SERPL: 26 (ref 6–20)
BUN/CREAT SERPL: 29 (ref 6–20)
BUN/CREAT SERPL: 30 (ref 6–20)
BUN/CREAT SERPL: 31 (ref 6–20)
BUN/CREAT SERPL: 31 (ref 6–20)
BUN/CREAT SERPL: 32 (ref 6–20)
BUN/CREAT SERPL: 32 (ref 6–20)
BUN/CREAT SERPL: 35 (ref 6–20)
BUN/CREAT SERPL: 36 (ref 6–20)
BUN/CREAT SERPL: 37 (ref 6–20)
BUN/CREAT SERPL: 39 (ref 6–20)
BUN/CREAT SERPL: 6 (ref 6–20)
BUN/CREAT SERPL: 9 (ref 6–20)
BUN/CREAT SERPL: 9 (ref 6–20)
BURR CELLS BLD QL SMEAR: ABNORMAL
CALCIUM SERPL-MCNC: 5.7 MG/DL (ref 8.5–10.1)
CALCIUM SERPL-MCNC: 6.2 MG/DL (ref 8.5–10.1)
CALCIUM SERPL-MCNC: 6.5 MG/DL (ref 8.5–10.1)
CALCIUM SERPL-MCNC: 6.5 MG/DL (ref 8.5–10.1)
CALCIUM SERPL-MCNC: 6.7 MG/DL (ref 8.5–10.1)
CALCIUM SERPL-MCNC: 6.7 MG/DL (ref 8.5–10.1)
CALCIUM SERPL-MCNC: 6.9 MG/DL (ref 8.5–10.1)
CALCIUM SERPL-MCNC: 7 MG/DL (ref 8.5–10.1)
CALCIUM SERPL-MCNC: 7.1 MG/DL (ref 8.5–10.1)
CALCIUM SERPL-MCNC: 7.2 MG/DL (ref 8.5–10.1)
CALCIUM SERPL-MCNC: 7.4 MG/DL (ref 8.5–10.1)
CALCIUM SERPL-MCNC: 7.4 MG/DL (ref 8.5–10.1)
CALCIUM SERPL-MCNC: 7.5 MG/DL (ref 8.5–10.1)
CALCIUM SERPL-MCNC: 7.5 MG/DL (ref 8.5–10.1)
CALCIUM SERPL-MCNC: 7.6 MG/DL (ref 8.5–10.1)
CALCIUM SERPL-MCNC: 7.7 MG/DL (ref 8.5–10.1)
CALCIUM SERPL-MCNC: 7.7 MG/DL (ref 8.5–10.1)
CALCIUM SERPL-MCNC: 7.9 MG/DL (ref 8.5–10.1)
CALCIUM SERPL-MCNC: 8 MG/DL (ref 8.5–10.1)
CALCIUM SERPL-MCNC: 8.1 MG/DL (ref 8.5–10.1)
CALCIUM SERPL-MCNC: 8.1 MG/DL (ref 8.5–10.1)
CALCIUM SERPL-MCNC: 8.2 MG/DL (ref 8.5–10.1)
CALCIUM SERPL-MCNC: 8.2 MG/DL (ref 8.5–10.1)
CALCIUM SERPL-MCNC: 8.3 MG/DL (ref 8.5–10.1)
CALCIUM SERPL-MCNC: 8.4 MG/DL (ref 8.5–10.1)
CALCIUM SERPL-MCNC: 8.5 MG/DL (ref 8.5–10.1)
CALCIUM SERPL-MCNC: 8.6 MG/DL (ref 8.5–10.1)
CALCIUM SERPL-MCNC: 8.6 MG/DL (ref 8.5–10.1)
CALCIUM SERPL-MCNC: 8.7 MG/DL (ref 8.5–10.1)
CALCIUM SERPL-MCNC: 8.8 MG/DL (ref 8.5–10.1)
CALCIUM SERPL-MCNC: 8.9 MG/DL (ref 8.5–10.1)
CALCIUM SERPL-MCNC: 9 MG/DL (ref 8.5–10.1)
CALCIUM SERPL-MCNC: 9.2 MG/DL (ref 8.5–10.1)
CALCIUM SERPL-MCNC: 9.2 MG/DL (ref 8.5–10.1)
CALCIUM SERPL-MCNC: 9.3 MG/DL (ref 8.5–10.1)
CHLORIDE SERPL-SCNC: 100 MMOL/L (ref 98–107)
CHLORIDE SERPL-SCNC: 101 MMOL/L (ref 98–107)
CHLORIDE SERPL-SCNC: 102 MMOL/L (ref 98–107)
CHLORIDE SERPL-SCNC: 103 MMOL/L (ref 98–107)
CHLORIDE SERPL-SCNC: 103 MMOL/L (ref 98–107)
CHLORIDE SERPL-SCNC: 104 MMOL/L (ref 98–107)
CHLORIDE SERPL-SCNC: 106 MMOL/L (ref 98–107)
CHLORIDE SERPL-SCNC: 87 MMOL/L (ref 98–107)
CHLORIDE SERPL-SCNC: 91 MMOL/L (ref 98–107)
CHLORIDE SERPL-SCNC: 93 MMOL/L (ref 98–107)
CHLORIDE SERPL-SCNC: 94 MMOL/L (ref 98–107)
CHLORIDE SERPL-SCNC: 94 MMOL/L (ref 98–107)
CHLORIDE SERPL-SCNC: 95 MMOL/L (ref 98–107)
CHLORIDE SERPL-SCNC: 96 MMOL/L (ref 98–107)
CHLORIDE SERPL-SCNC: 97 MMOL/L (ref 98–107)
CHLORIDE SERPL-SCNC: 98 MMOL/L (ref 98–107)
CHLORIDE SERPL-SCNC: 99 MMOL/L (ref 98–107)
CO2 SERPL-SCNC: 15 MMOL/L (ref 21–32)
CO2 SERPL-SCNC: 16 MMOL/L (ref 21–32)
CO2 SERPL-SCNC: 16 MMOL/L (ref 21–32)
CO2 SERPL-SCNC: 19 MMOL/L (ref 21–32)
CO2 SERPL-SCNC: 20 MMOL/L (ref 21–32)
CO2 SERPL-SCNC: 20 MMOL/L (ref 21–32)
CO2 SERPL-SCNC: 21 MMOL/L (ref 21–32)
CO2 SERPL-SCNC: 22 MMOL/L (ref 21–32)
CO2 SERPL-SCNC: 23 MMOL/L (ref 21–32)
CO2 SERPL-SCNC: 24 MMOL/L (ref 21–32)
CO2 SERPL-SCNC: 25 MMOL/L (ref 21–32)
CO2 SERPL-SCNC: 26 MMOL/L (ref 21–32)
CO2 SERPL-SCNC: 27 MMOL/L (ref 21–32)
CO2 SERPL-SCNC: 28 MMOL/L (ref 21–32)
CO2 SERPL-SCNC: 9 MMOL/L (ref 21–32)
CREAT SERPL-MCNC: 0.95 MG/DL (ref 0.7–1.3)
CREAT SERPL-MCNC: 1.25 MG/DL (ref 0.7–1.3)
CREAT SERPL-MCNC: 1.49 MG/DL (ref 0.7–1.3)
CREAT SERPL-MCNC: 1.55 MG/DL (ref 0.7–1.3)
CREAT SERPL-MCNC: 1.65 MG/DL (ref 0.7–1.3)
CREAT SERPL-MCNC: 1.66 MG/DL (ref 0.7–1.3)
CREAT SERPL-MCNC: 1.79 MG/DL (ref 0.7–1.3)
CREAT SERPL-MCNC: 1.82 MG/DL (ref 0.7–1.3)
CREAT SERPL-MCNC: 1.92 MG/DL (ref 0.7–1.3)
CREAT SERPL-MCNC: 1.95 MG/DL (ref 0.7–1.3)
CREAT SERPL-MCNC: 1.97 MG/DL (ref 0.7–1.3)
CREAT SERPL-MCNC: 2.05 MG/DL (ref 0.7–1.3)
CREAT SERPL-MCNC: 2.13 MG/DL (ref 0.7–1.3)
CREAT SERPL-MCNC: 2.14 MG/DL (ref 0.7–1.3)
CREAT SERPL-MCNC: 2.17 MG/DL (ref 0.7–1.3)
CREAT SERPL-MCNC: 2.24 MG/DL (ref 0.7–1.3)
CREAT SERPL-MCNC: 2.32 MG/DL (ref 0.7–1.3)
CREAT SERPL-MCNC: 2.4 MG/DL (ref 0.7–1.3)
CREAT SERPL-MCNC: 2.43 MG/DL (ref 0.7–1.3)
CREAT SERPL-MCNC: 2.46 MG/DL (ref 0.7–1.3)
CREAT SERPL-MCNC: 2.46 MG/DL (ref 0.7–1.3)
CREAT SERPL-MCNC: 2.47 MG/DL (ref 0.7–1.3)
CREAT SERPL-MCNC: 2.48 MG/DL (ref 0.7–1.3)
CREAT SERPL-MCNC: 2.55 MG/DL (ref 0.7–1.3)
CREAT SERPL-MCNC: 2.57 MG/DL (ref 0.7–1.3)
CREAT SERPL-MCNC: 2.59 MG/DL (ref 0.7–1.3)
CREAT SERPL-MCNC: 2.62 MG/DL (ref 0.7–1.3)
CREAT SERPL-MCNC: 2.85 MG/DL (ref 0.7–1.3)
CREAT SERPL-MCNC: 2.85 MG/DL (ref 0.7–1.3)
CREAT SERPL-MCNC: 2.98 MG/DL (ref 0.7–1.3)
CREAT SERPL-MCNC: 3.21 MG/DL (ref 0.7–1.3)
CREAT SERPL-MCNC: 3.22 MG/DL (ref 0.7–1.3)
CREAT SERPL-MCNC: 3.34 MG/DL (ref 0.7–1.3)
CREAT SERPL-MCNC: 3.44 MG/DL (ref 0.7–1.3)
CREAT SERPL-MCNC: 3.56 MG/DL (ref 0.7–1.3)
CREAT SERPL-MCNC: 3.86 MG/DL (ref 0.7–1.3)
CREAT SERPL-MCNC: 4.12 MG/DL (ref 0.7–1.3)
CREAT SERPL-MCNC: 4.15 MG/DL (ref 0.7–1.3)
CREAT SERPL-MCNC: 4.24 MG/DL (ref 0.7–1.3)
CREAT SERPL-MCNC: 4.32 MG/DL (ref 0.7–1.3)
CREAT SERPL-MCNC: 4.68 MG/DL (ref 0.7–1.3)
CREAT SERPL-MCNC: 4.71 MG/DL (ref 0.7–1.3)
CREAT SERPL-MCNC: 4.85 MG/DL (ref 0.7–1.3)
CREAT SERPL-MCNC: 5.1 MG/DL (ref 0.7–1.3)
CREAT SERPL-MCNC: 5.11 MG/DL (ref 0.7–1.3)
CREAT SERPL-MCNC: 5.14 MG/DL (ref 0.7–1.3)
CREAT SERPL-MCNC: 5.28 MG/DL (ref 0.7–1.3)
CREAT SERPL-MCNC: 5.3 MG/DL (ref 0.7–1.3)
CREAT SERPL-MCNC: 6.1 MG/DL (ref 0.7–1.3)
CREAT SERPL-MCNC: 6.42 MG/DL (ref 0.7–1.3)
CREAT SERPL-MCNC: 7.3 MG/DL (ref 0.7–1.3)
CRENATED CELLS: ABNORMAL
CRENATED CELLS: ABNORMAL
CROSSMATCH INTERPRETATION: NORMAL
CRP SERPL-MCNC: 16.7 MG/DL (ref 0–0.8)
CULTURE, BONE: ABNORMAL
CULTURE, LOWER RESPIRATORY: ABNORMAL
CV ECHO LV RWT: 0.52 CM
DIFFERENTIAL METHOD BLD: ABNORMAL
DISPENSE STATUS: NORMAL
DOHLE BOD BLD QL SMEAR: ABNORMAL
DOHLE BOD BLD QL SMEAR: ABNORMAL
DOP CALC AO PEAK VEL: 1 M/S
DOP CALC AO VTI: 15 CM
DOP CALC LVOT AREA: 3.1 CM2
DOP CALC LVOT DIAMETER: 2 CM
DOP CALC LVOT PEAK VEL: 0.8 M/S
DOP CALC LVOT STROKE VOLUME: 37.68 CM3
DOP CALCLVOT PEAK VEL VTI: 12 CM
E WAVE DECELERATION TIME: 196 MSEC
ECHO EF ESTIMATED: 50 %
ECHO LV POSTERIOR WALL: 1.19 CM (ref 0.6–1.1)
EJECTION FRACTION: 50 %
EOSINOPHIL # BLD AUTO: 0 K/UL (ref 0–0.5)
EOSINOPHIL # BLD AUTO: 0.06 K/UL (ref 0–0.5)
EOSINOPHIL # BLD AUTO: 0.07 K/UL (ref 0–0.5)
EOSINOPHIL # BLD AUTO: 0.08 K/UL (ref 0–0.5)
EOSINOPHIL # BLD AUTO: 0.12 K/UL (ref 0–0.5)
EOSINOPHIL # BLD AUTO: 0.13 K/UL (ref 0–0.5)
EOSINOPHIL # BLD AUTO: 0.14 K/UL (ref 0–0.5)
EOSINOPHIL # BLD AUTO: 0.15 K/UL (ref 0–0.5)
EOSINOPHIL # BLD AUTO: 0.16 K/UL (ref 0–0.5)
EOSINOPHIL # BLD AUTO: 0.17 K/UL (ref 0–0.5)
EOSINOPHIL # BLD AUTO: 0.19 K/UL (ref 0–0.5)
EOSINOPHIL # BLD AUTO: 0.2 K/UL (ref 0–0.5)
EOSINOPHIL # BLD AUTO: 0.2 K/UL (ref 0–0.5)
EOSINOPHIL # BLD AUTO: 0.23 K/UL (ref 0–0.5)
EOSINOPHIL # BLD AUTO: 0.27 K/UL (ref 0–0.5)
EOSINOPHIL # BLD AUTO: 0.28 K/UL (ref 0–0.5)
EOSINOPHIL # BLD AUTO: 0.28 K/UL (ref 0–0.5)
EOSINOPHIL # BLD AUTO: 0.3 K/UL (ref 0–0.5)
EOSINOPHIL # BLD AUTO: 0.31 K/UL (ref 0–0.5)
EOSINOPHIL # BLD AUTO: 0.31 K/UL (ref 0–0.5)
EOSINOPHIL # BLD AUTO: 0.32 K/UL (ref 0–0.5)
EOSINOPHIL # BLD AUTO: 0.34 K/UL (ref 0–0.5)
EOSINOPHIL # BLD AUTO: 0.36 K/UL (ref 0–0.5)
EOSINOPHIL # BLD AUTO: 0.39 K/UL (ref 0–0.5)
EOSINOPHIL # BLD AUTO: 0.42 K/UL (ref 0–0.5)
EOSINOPHIL # BLD AUTO: 0.48 K/UL (ref 0–0.5)
EOSINOPHIL # BLD AUTO: 0.51 K/UL (ref 0–0.5)
EOSINOPHIL # BLD AUTO: 0.51 K/UL (ref 0–0.5)
EOSINOPHIL # BLD AUTO: 0.56 K/UL (ref 0–0.5)
EOSINOPHIL # BLD AUTO: 0.56 K/UL (ref 0–0.5)
EOSINOPHIL # BLD AUTO: 0.58 K/UL (ref 0–0.5)
EOSINOPHIL # BLD AUTO: 0.59 K/UL (ref 0–0.5)
EOSINOPHIL # BLD AUTO: 0.61 K/UL (ref 0–0.5)
EOSINOPHIL # BLD AUTO: 0.63 K/UL (ref 0–0.5)
EOSINOPHIL # BLD AUTO: 0.63 K/UL (ref 0–0.5)
EOSINOPHIL # BLD AUTO: 0.64 K/UL (ref 0–0.5)
EOSINOPHIL # BLD AUTO: 0.66 K/UL (ref 0–0.5)
EOSINOPHIL # BLD AUTO: 0.67 K/UL (ref 0–0.5)
EOSINOPHIL # BLD AUTO: 0.7 K/UL (ref 0–0.5)
EOSINOPHIL # BLD AUTO: 0.71 K/UL (ref 0–0.5)
EOSINOPHIL # BLD AUTO: 0.72 K/UL (ref 0–0.5)
EOSINOPHIL # BLD AUTO: 0.76 K/UL (ref 0–0.5)
EOSINOPHIL # BLD AUTO: 0.76 K/UL (ref 0–0.5)
EOSINOPHIL # BLD AUTO: 0.79 K/UL (ref 0–0.5)
EOSINOPHIL # BLD AUTO: 0.8 K/UL (ref 0–0.5)
EOSINOPHIL # BLD AUTO: 0.82 K/UL (ref 0–0.5)
EOSINOPHIL # BLD AUTO: 0.87 K/UL (ref 0–0.5)
EOSINOPHIL # BLD AUTO: 0.88 K/UL (ref 0–0.5)
EOSINOPHIL # BLD AUTO: 1 K/UL (ref 0–0.5)
EOSINOPHIL # BLD AUTO: 1.3 K/UL (ref 0–0.5)
EOSINOPHIL # BLD AUTO: 1.49 K/UL (ref 0–0.5)
EOSINOPHIL # BLD AUTO: 1.5 K/UL (ref 0–0.5)
EOSINOPHIL # BLD AUTO: 1.51 K/UL (ref 0–0.5)
EOSINOPHIL # BLD AUTO: 1.97 K/UL (ref 0–0.5)
EOSINOPHIL # BLD AUTO: 2.51 K/UL (ref 0–0.5)
EOSINOPHIL NFR BLD AUTO: 0 % (ref 1–4)
EOSINOPHIL NFR BLD AUTO: 0.4 % (ref 1–4)
EOSINOPHIL NFR BLD AUTO: 0.4 % (ref 1–4)
EOSINOPHIL NFR BLD AUTO: 0.6 % (ref 1–4)
EOSINOPHIL NFR BLD AUTO: 0.8 % (ref 1–4)
EOSINOPHIL NFR BLD AUTO: 0.9 % (ref 1–4)
EOSINOPHIL NFR BLD AUTO: 1 % (ref 1–4)
EOSINOPHIL NFR BLD AUTO: 1 % (ref 1–4)
EOSINOPHIL NFR BLD AUTO: 1.1 % (ref 1–4)
EOSINOPHIL NFR BLD AUTO: 1.1 % (ref 1–4)
EOSINOPHIL NFR BLD AUTO: 1.2 % (ref 1–4)
EOSINOPHIL NFR BLD AUTO: 1.2 % (ref 1–4)
EOSINOPHIL NFR BLD AUTO: 1.3 % (ref 1–4)
EOSINOPHIL NFR BLD AUTO: 1.3 % (ref 1–4)
EOSINOPHIL NFR BLD AUTO: 1.4 % (ref 1–4)
EOSINOPHIL NFR BLD AUTO: 1.8 % (ref 1–4)
EOSINOPHIL NFR BLD AUTO: 1.9 % (ref 1–4)
EOSINOPHIL NFR BLD AUTO: 12.2 % (ref 1–4)
EOSINOPHIL NFR BLD AUTO: 2.2 % (ref 1–4)
EOSINOPHIL NFR BLD AUTO: 2.4 % (ref 1–4)
EOSINOPHIL NFR BLD AUTO: 2.6 % (ref 1–4)
EOSINOPHIL NFR BLD AUTO: 2.7 % (ref 1–4)
EOSINOPHIL NFR BLD AUTO: 2.8 % (ref 1–4)
EOSINOPHIL NFR BLD AUTO: 2.8 % (ref 1–4)
EOSINOPHIL NFR BLD AUTO: 3 % (ref 1–4)
EOSINOPHIL NFR BLD AUTO: 3.5 % (ref 1–4)
EOSINOPHIL NFR BLD AUTO: 3.6 % (ref 1–4)
EOSINOPHIL NFR BLD AUTO: 3.7 % (ref 1–4)
EOSINOPHIL NFR BLD AUTO: 3.8 % (ref 1–4)
EOSINOPHIL NFR BLD AUTO: 3.9 % (ref 1–4)
EOSINOPHIL NFR BLD AUTO: 4 % (ref 1–4)
EOSINOPHIL NFR BLD AUTO: 4 % (ref 1–4)
EOSINOPHIL NFR BLD AUTO: 4.2 % (ref 1–4)
EOSINOPHIL NFR BLD AUTO: 4.5 % (ref 1–4)
EOSINOPHIL NFR BLD AUTO: 4.7 % (ref 1–4)
EOSINOPHIL NFR BLD AUTO: 4.8 % (ref 1–4)
EOSINOPHIL NFR BLD AUTO: 4.9 % (ref 1–4)
EOSINOPHIL NFR BLD AUTO: 5.1 % (ref 1–4)
EOSINOPHIL NFR BLD AUTO: 5.1 % (ref 1–4)
EOSINOPHIL NFR BLD AUTO: 5.4 % (ref 1–4)
EOSINOPHIL NFR BLD AUTO: 5.7 % (ref 1–4)
EOSINOPHIL NFR BLD AUTO: 6.3 % (ref 1–4)
EOSINOPHIL NFR BLD AUTO: 6.5 % (ref 1–4)
EOSINOPHIL NFR BLD AUTO: 7.7 % (ref 1–4)
EOSINOPHIL NFR BLD AUTO: 7.9 % (ref 1–4)
EOSINOPHIL NFR BLD AUTO: 8.1 % (ref 1–4)
EOSINOPHIL NFR BLD AUTO: 8.4 % (ref 1–4)
EOSINOPHIL NFR BLD MANUAL: 1 % (ref 1–4)
EOSINOPHIL NFR BLD MANUAL: 13 % (ref 1–4)
EOSINOPHIL NFR BLD MANUAL: 2 % (ref 1–4)
EOSINOPHIL NFR BLD MANUAL: 3 % (ref 1–4)
EOSINOPHIL NFR BLD MANUAL: 4 % (ref 1–4)
EOSINOPHIL NFR BLD MANUAL: 5 % (ref 1–4)
EOSINOPHIL NFR BLD MANUAL: 6 % (ref 1–4)
EOSINOPHIL NFR BLD MANUAL: 8 % (ref 1–4)
EOSINOPHIL NFR BLD MANUAL: 9 % (ref 1–4)
ERYTHROCYTE [DISTWIDTH] IN BLOOD BY AUTOMATED COUNT: 13.7 % (ref 11.5–14.5)
ERYTHROCYTE [DISTWIDTH] IN BLOOD BY AUTOMATED COUNT: 13.8 % (ref 11.5–14.5)
ERYTHROCYTE [DISTWIDTH] IN BLOOD BY AUTOMATED COUNT: 14 % (ref 11.5–14.5)
ERYTHROCYTE [DISTWIDTH] IN BLOOD BY AUTOMATED COUNT: 14.3 % (ref 11.5–14.5)
ERYTHROCYTE [DISTWIDTH] IN BLOOD BY AUTOMATED COUNT: 14.3 % (ref 11.5–14.5)
ERYTHROCYTE [DISTWIDTH] IN BLOOD BY AUTOMATED COUNT: 14.4 % (ref 11.5–14.5)
ERYTHROCYTE [DISTWIDTH] IN BLOOD BY AUTOMATED COUNT: 14.6 % (ref 11.5–14.5)
ERYTHROCYTE [DISTWIDTH] IN BLOOD BY AUTOMATED COUNT: 14.7 % (ref 11.5–14.5)
ERYTHROCYTE [DISTWIDTH] IN BLOOD BY AUTOMATED COUNT: 14.8 % (ref 11.5–14.5)
ERYTHROCYTE [DISTWIDTH] IN BLOOD BY AUTOMATED COUNT: 14.8 % (ref 11.5–14.5)
ERYTHROCYTE [DISTWIDTH] IN BLOOD BY AUTOMATED COUNT: 15 % (ref 11.5–14.5)
ERYTHROCYTE [DISTWIDTH] IN BLOOD BY AUTOMATED COUNT: 15.1 % (ref 11.5–14.5)
ERYTHROCYTE [DISTWIDTH] IN BLOOD BY AUTOMATED COUNT: 15.2 % (ref 11.5–14.5)
ERYTHROCYTE [DISTWIDTH] IN BLOOD BY AUTOMATED COUNT: 15.3 % (ref 11.5–14.5)
ERYTHROCYTE [DISTWIDTH] IN BLOOD BY AUTOMATED COUNT: 15.4 % (ref 11.5–14.5)
ERYTHROCYTE [DISTWIDTH] IN BLOOD BY AUTOMATED COUNT: 15.5 % (ref 11.5–14.5)
ERYTHROCYTE [DISTWIDTH] IN BLOOD BY AUTOMATED COUNT: 15.6 % (ref 11.5–14.5)
ERYTHROCYTE [DISTWIDTH] IN BLOOD BY AUTOMATED COUNT: 15.7 % (ref 11.5–14.5)
ERYTHROCYTE [DISTWIDTH] IN BLOOD BY AUTOMATED COUNT: 15.8 % (ref 11.5–14.5)
ERYTHROCYTE [DISTWIDTH] IN BLOOD BY AUTOMATED COUNT: 15.8 % (ref 11.5–14.5)
ERYTHROCYTE [DISTWIDTH] IN BLOOD BY AUTOMATED COUNT: 15.9 % (ref 11.5–14.5)
ERYTHROCYTE [DISTWIDTH] IN BLOOD BY AUTOMATED COUNT: 16 % (ref 11.5–14.5)
ERYTHROCYTE [DISTWIDTH] IN BLOOD BY AUTOMATED COUNT: 16 % (ref 11.5–14.5)
ERYTHROCYTE [DISTWIDTH] IN BLOOD BY AUTOMATED COUNT: 16.3 % (ref 11.5–14.5)
ERYTHROCYTE [DISTWIDTH] IN BLOOD BY AUTOMATED COUNT: 16.5 % (ref 11.5–14.5)
ERYTHROCYTE [DISTWIDTH] IN BLOOD BY AUTOMATED COUNT: 16.5 % (ref 11.5–14.5)
ERYTHROCYTE [DISTWIDTH] IN BLOOD BY AUTOMATED COUNT: 16.7 % (ref 11.5–14.5)
ERYTHROCYTE [SEDIMENTATION RATE] IN BLOOD BY WESTERGREN METHOD: >150 MM/HR (ref 0–20)
ESTROGEN SERPL-MCNC: NORMAL PG/ML
FERRITIN SERPL-MCNC: 3947 NG/ML (ref 26–388)
FERRITIN SERPL-MCNC: 5343 NG/ML (ref 26–388)
FLUAV AG UPPER RESP QL IA.RAPID: NEGATIVE
FLUBV AG UPPER RESP QL IA.RAPID: NEGATIVE
FOLATE SERPL-MCNC: 15.5 NG/ML (ref 3.1–17.5)
FOLATE SERPL-MCNC: 18.5 NG/ML (ref 3.1–17.5)
FRACTIONAL SHORTENING: 28 % (ref 28–44)
GENTAMICIN SERPL-MCNC: 0.2 ΜG/ML (ref 0–1.9)
GENTAMICIN SERPL-MCNC: 0.5 ΜG/ML (ref 0–1.9)
GENTAMICIN SERPL-MCNC: 0.5 ΜG/ML (ref 0–1.9)
GENTAMICIN SERPL-MCNC: 0.7 ΜG/ML (ref 0–1.9)
GENTAMICIN SERPL-MCNC: 2 ΜG/ML (ref 0–1.9)
GENTAMICIN SERPL-MCNC: 2.2 ΜG/ML (ref 0–1.9)
GLOBULIN SER-MCNC: 4.8 G/DL (ref 2–4)
GLOBULIN SER-MCNC: 5.2 G/DL (ref 2–4)
GLOBULIN SER-MCNC: 6 G/DL (ref 2–4)
GLOBULIN SER-MCNC: 6.1 G/DL (ref 2–4)
GLOBULIN SER-MCNC: 6.2 G/DL (ref 2–4)
GLOBULIN SER-MCNC: 6.2 G/DL (ref 2–4)
GLUCOSE SERPL-MCNC: 100 MG/DL (ref 70–105)
GLUCOSE SERPL-MCNC: 101 MG/DL (ref 70–105)
GLUCOSE SERPL-MCNC: 101 MG/DL (ref 74–106)
GLUCOSE SERPL-MCNC: 102 MG/DL (ref 70–105)
GLUCOSE SERPL-MCNC: 103 MG/DL (ref 70–105)
GLUCOSE SERPL-MCNC: 104 MG/DL (ref 70–105)
GLUCOSE SERPL-MCNC: 104 MG/DL (ref 74–106)
GLUCOSE SERPL-MCNC: 104 MG/DL (ref 74–106)
GLUCOSE SERPL-MCNC: 105 MG/DL (ref 70–105)
GLUCOSE SERPL-MCNC: 105 MG/DL (ref 74–106)
GLUCOSE SERPL-MCNC: 105 MG/DL (ref 74–106)
GLUCOSE SERPL-MCNC: 106 MG/DL (ref 70–105)
GLUCOSE SERPL-MCNC: 107 MG/DL (ref 70–105)
GLUCOSE SERPL-MCNC: 108 MG/DL (ref 70–105)
GLUCOSE SERPL-MCNC: 108 MG/DL (ref 70–110)
GLUCOSE SERPL-MCNC: 109 MG/DL (ref 70–105)
GLUCOSE SERPL-MCNC: 110 MG/DL (ref 70–105)
GLUCOSE SERPL-MCNC: 110 MG/DL (ref 74–106)
GLUCOSE SERPL-MCNC: 111 MG/DL (ref 70–105)
GLUCOSE SERPL-MCNC: 112 MG/DL (ref 70–105)
GLUCOSE SERPL-MCNC: 112 MG/DL (ref 74–106)
GLUCOSE SERPL-MCNC: 113 MG/DL (ref 70–105)
GLUCOSE SERPL-MCNC: 113 MG/DL (ref 74–106)
GLUCOSE SERPL-MCNC: 114 MG/DL (ref 70–105)
GLUCOSE SERPL-MCNC: 115 MG/DL (ref 70–105)
GLUCOSE SERPL-MCNC: 116 MG/DL (ref 70–105)
GLUCOSE SERPL-MCNC: 117 MG/DL (ref 70–105)
GLUCOSE SERPL-MCNC: 117 MG/DL (ref 74–106)
GLUCOSE SERPL-MCNC: 117 MG/DL (ref 74–106)
GLUCOSE SERPL-MCNC: 118 MG/DL (ref 70–105)
GLUCOSE SERPL-MCNC: 119 MG/DL (ref 70–105)
GLUCOSE SERPL-MCNC: 120 MG/DL (ref 70–105)
GLUCOSE SERPL-MCNC: 121 MG/DL (ref 70–105)
GLUCOSE SERPL-MCNC: 122 MG/DL (ref 70–105)
GLUCOSE SERPL-MCNC: 122 MG/DL (ref 74–106)
GLUCOSE SERPL-MCNC: 123 MG/DL (ref 70–105)
GLUCOSE SERPL-MCNC: 124 MG/DL (ref 70–105)
GLUCOSE SERPL-MCNC: 125 MG/DL (ref 70–105)
GLUCOSE SERPL-MCNC: 126 MG/DL (ref 70–105)
GLUCOSE SERPL-MCNC: 127 MG/DL (ref 70–105)
GLUCOSE SERPL-MCNC: 128 MG/DL (ref 70–105)
GLUCOSE SERPL-MCNC: 129 MG/DL (ref 70–105)
GLUCOSE SERPL-MCNC: 130 MG/DL (ref 70–105)
GLUCOSE SERPL-MCNC: 131 MG/DL (ref 70–105)
GLUCOSE SERPL-MCNC: 132 MG/DL (ref 70–105)
GLUCOSE SERPL-MCNC: 132 MG/DL (ref 74–106)
GLUCOSE SERPL-MCNC: 133 MG/DL (ref 70–105)
GLUCOSE SERPL-MCNC: 134 MG/DL (ref 70–105)
GLUCOSE SERPL-MCNC: 135 MG/DL (ref 70–105)
GLUCOSE SERPL-MCNC: 136 MG/DL (ref 70–105)
GLUCOSE SERPL-MCNC: 137 MG/DL (ref 70–105)
GLUCOSE SERPL-MCNC: 138 MG/DL (ref 70–105)
GLUCOSE SERPL-MCNC: 138 MG/DL (ref 74–106)
GLUCOSE SERPL-MCNC: 139 MG/DL (ref 70–105)
GLUCOSE SERPL-MCNC: 140 MG/DL (ref 70–105)
GLUCOSE SERPL-MCNC: 140 MG/DL (ref 74–106)
GLUCOSE SERPL-MCNC: 141 MG/DL (ref 70–105)
GLUCOSE SERPL-MCNC: 142 MG/DL (ref 70–105)
GLUCOSE SERPL-MCNC: 143 MG/DL (ref 70–105)
GLUCOSE SERPL-MCNC: 144 MG/DL (ref 70–105)
GLUCOSE SERPL-MCNC: 145 MG/DL (ref 70–105)
GLUCOSE SERPL-MCNC: 145 MG/DL (ref 74–106)
GLUCOSE SERPL-MCNC: 146 MG/DL (ref 70–105)
GLUCOSE SERPL-MCNC: 147 MG/DL (ref 70–105)
GLUCOSE SERPL-MCNC: 147 MG/DL (ref 74–106)
GLUCOSE SERPL-MCNC: 148 MG/DL (ref 70–105)
GLUCOSE SERPL-MCNC: 149 MG/DL (ref 70–105)
GLUCOSE SERPL-MCNC: 150 MG/DL (ref 70–105)
GLUCOSE SERPL-MCNC: 151 MG/DL (ref 70–105)
GLUCOSE SERPL-MCNC: 152 MG/DL (ref 70–105)
GLUCOSE SERPL-MCNC: 153 MG/DL (ref 70–105)
GLUCOSE SERPL-MCNC: 154 MG/DL (ref 70–105)
GLUCOSE SERPL-MCNC: 155 MG/DL (ref 70–105)
GLUCOSE SERPL-MCNC: 156 MG/DL (ref 70–105)
GLUCOSE SERPL-MCNC: 157 MG/DL (ref 70–105)
GLUCOSE SERPL-MCNC: 158 MG/DL (ref 70–105)
GLUCOSE SERPL-MCNC: 159 MG/DL (ref 70–105)
GLUCOSE SERPL-MCNC: 160 MG/DL (ref 70–105)
GLUCOSE SERPL-MCNC: 161 MG/DL (ref 70–105)
GLUCOSE SERPL-MCNC: 162 MG/DL (ref 70–105)
GLUCOSE SERPL-MCNC: 163 MG/DL (ref 70–105)
GLUCOSE SERPL-MCNC: 164 MG/DL (ref 70–105)
GLUCOSE SERPL-MCNC: 164 MG/DL (ref 74–106)
GLUCOSE SERPL-MCNC: 165 MG/DL (ref 70–105)
GLUCOSE SERPL-MCNC: 165 MG/DL (ref 70–105)
GLUCOSE SERPL-MCNC: 166 MG/DL (ref 70–105)
GLUCOSE SERPL-MCNC: 167 MG/DL (ref 70–105)
GLUCOSE SERPL-MCNC: 168 MG/DL (ref 70–105)
GLUCOSE SERPL-MCNC: 168 MG/DL (ref 74–106)
GLUCOSE SERPL-MCNC: 169 MG/DL (ref 70–105)
GLUCOSE SERPL-MCNC: 170 MG/DL (ref 70–105)
GLUCOSE SERPL-MCNC: 171 MG/DL (ref 70–105)
GLUCOSE SERPL-MCNC: 172 MG/DL (ref 70–105)
GLUCOSE SERPL-MCNC: 172 MG/DL (ref 70–105)
GLUCOSE SERPL-MCNC: 172 MG/DL (ref 74–106)
GLUCOSE SERPL-MCNC: 174 MG/DL (ref 70–105)
GLUCOSE SERPL-MCNC: 175 MG/DL (ref 70–105)
GLUCOSE SERPL-MCNC: 175 MG/DL (ref 70–105)
GLUCOSE SERPL-MCNC: 176 MG/DL (ref 70–105)
GLUCOSE SERPL-MCNC: 177 MG/DL (ref 70–105)
GLUCOSE SERPL-MCNC: 178 MG/DL (ref 70–105)
GLUCOSE SERPL-MCNC: 178 MG/DL (ref 74–106)
GLUCOSE SERPL-MCNC: 179 MG/DL (ref 70–105)
GLUCOSE SERPL-MCNC: 179 MG/DL (ref 70–105)
GLUCOSE SERPL-MCNC: 180 MG/DL (ref 70–105)
GLUCOSE SERPL-MCNC: 181 MG/DL (ref 70–105)
GLUCOSE SERPL-MCNC: 182 MG/DL (ref 70–105)
GLUCOSE SERPL-MCNC: 183 MG/DL (ref 70–105)
GLUCOSE SERPL-MCNC: 184 MG/DL (ref 70–105)
GLUCOSE SERPL-MCNC: 185 MG/DL (ref 70–105)
GLUCOSE SERPL-MCNC: 185 MG/DL (ref 70–105)
GLUCOSE SERPL-MCNC: 187 MG/DL (ref 70–105)
GLUCOSE SERPL-MCNC: 188 MG/DL (ref 70–105)
GLUCOSE SERPL-MCNC: 189 MG/DL (ref 70–105)
GLUCOSE SERPL-MCNC: 190 MG/DL (ref 70–105)
GLUCOSE SERPL-MCNC: 191 MG/DL (ref 70–105)
GLUCOSE SERPL-MCNC: 192 MG/DL (ref 70–105)
GLUCOSE SERPL-MCNC: 193 MG/DL (ref 70–105)
GLUCOSE SERPL-MCNC: 194 MG/DL (ref 70–105)
GLUCOSE SERPL-MCNC: 196 MG/DL (ref 70–105)
GLUCOSE SERPL-MCNC: 197 MG/DL (ref 70–105)
GLUCOSE SERPL-MCNC: 197 MG/DL (ref 70–105)
GLUCOSE SERPL-MCNC: 198 MG/DL (ref 70–105)
GLUCOSE SERPL-MCNC: 198 MG/DL (ref 70–105)
GLUCOSE SERPL-MCNC: 198 MG/DL (ref 74–106)
GLUCOSE SERPL-MCNC: 199 MG/DL (ref 70–105)
GLUCOSE SERPL-MCNC: 200 MG/DL (ref 70–105)
GLUCOSE SERPL-MCNC: 203 MG/DL (ref 70–105)
GLUCOSE SERPL-MCNC: 204 MG/DL (ref 70–105)
GLUCOSE SERPL-MCNC: 204 MG/DL (ref 70–105)
GLUCOSE SERPL-MCNC: 206 MG/DL (ref 70–105)
GLUCOSE SERPL-MCNC: 207 MG/DL (ref 70–105)
GLUCOSE SERPL-MCNC: 209 MG/DL (ref 70–105)
GLUCOSE SERPL-MCNC: 209 MG/DL (ref 70–105)
GLUCOSE SERPL-MCNC: 212 MG/DL (ref 70–105)
GLUCOSE SERPL-MCNC: 213 MG/DL (ref 70–105)
GLUCOSE SERPL-MCNC: 214 MG/DL (ref 70–105)
GLUCOSE SERPL-MCNC: 215 MG/DL (ref 74–106)
GLUCOSE SERPL-MCNC: 216 MG/DL (ref 70–105)
GLUCOSE SERPL-MCNC: 218 MG/DL (ref 70–105)
GLUCOSE SERPL-MCNC: 219 MG/DL (ref 74–106)
GLUCOSE SERPL-MCNC: 220 MG/DL (ref 70–105)
GLUCOSE SERPL-MCNC: 221 MG/DL (ref 70–105)
GLUCOSE SERPL-MCNC: 221 MG/DL (ref 74–106)
GLUCOSE SERPL-MCNC: 223 MG/DL (ref 70–105)
GLUCOSE SERPL-MCNC: 223 MG/DL (ref 70–105)
GLUCOSE SERPL-MCNC: 224 MG/DL (ref 70–105)
GLUCOSE SERPL-MCNC: 226 MG/DL (ref 70–105)
GLUCOSE SERPL-MCNC: 227 MG/DL (ref 70–105)
GLUCOSE SERPL-MCNC: 227 MG/DL (ref 70–105)
GLUCOSE SERPL-MCNC: 231 MG/DL (ref 70–105)
GLUCOSE SERPL-MCNC: 231 MG/DL (ref 70–105)
GLUCOSE SERPL-MCNC: 235 MG/DL (ref 70–105)
GLUCOSE SERPL-MCNC: 238 MG/DL (ref 70–105)
GLUCOSE SERPL-MCNC: 239 MG/DL (ref 70–105)
GLUCOSE SERPL-MCNC: 243 MG/DL (ref 70–105)
GLUCOSE SERPL-MCNC: 250 MG/DL (ref 70–105)
GLUCOSE SERPL-MCNC: 252 MG/DL (ref 70–105)
GLUCOSE SERPL-MCNC: 257 MG/DL (ref 70–105)
GLUCOSE SERPL-MCNC: 258 MG/DL (ref 74–106)
GLUCOSE SERPL-MCNC: 260 MG/DL (ref 70–105)
GLUCOSE SERPL-MCNC: 268 MG/DL (ref 74–106)
GLUCOSE SERPL-MCNC: 271 MG/DL (ref 74–106)
GLUCOSE SERPL-MCNC: 275 MG/DL (ref 70–105)
GLUCOSE SERPL-MCNC: 277 MG/DL (ref 70–105)
GLUCOSE SERPL-MCNC: 278 MG/DL (ref 70–105)
GLUCOSE SERPL-MCNC: 279 MG/DL (ref 70–105)
GLUCOSE SERPL-MCNC: 279 MG/DL (ref 70–105)
GLUCOSE SERPL-MCNC: 280 MG/DL (ref 70–105)
GLUCOSE SERPL-MCNC: 282 MG/DL (ref 70–105)
GLUCOSE SERPL-MCNC: 282 MG/DL (ref 70–105)
GLUCOSE SERPL-MCNC: 286 MG/DL (ref 70–105)
GLUCOSE SERPL-MCNC: 287 MG/DL (ref 70–105)
GLUCOSE SERPL-MCNC: 287 MG/DL (ref 70–105)
GLUCOSE SERPL-MCNC: 288 MG/DL (ref 70–105)
GLUCOSE SERPL-MCNC: 288 MG/DL (ref 70–105)
GLUCOSE SERPL-MCNC: 290 MG/DL (ref 70–105)
GLUCOSE SERPL-MCNC: 293 MG/DL (ref 70–105)
GLUCOSE SERPL-MCNC: 293 MG/DL (ref 70–105)
GLUCOSE SERPL-MCNC: 294 MG/DL (ref 70–105)
GLUCOSE SERPL-MCNC: 296 MG/DL (ref 70–105)
GLUCOSE SERPL-MCNC: 298 MG/DL (ref 70–105)
GLUCOSE SERPL-MCNC: 300 MG/DL (ref 74–106)
GLUCOSE SERPL-MCNC: 304 MG/DL (ref 70–105)
GLUCOSE SERPL-MCNC: 309 MG/DL (ref 70–105)
GLUCOSE SERPL-MCNC: 311 MG/DL (ref 70–105)
GLUCOSE SERPL-MCNC: 311 MG/DL (ref 74–106)
GLUCOSE SERPL-MCNC: 316 MG/DL (ref 70–105)
GLUCOSE SERPL-MCNC: 318 MG/DL (ref 74–106)
GLUCOSE SERPL-MCNC: 319 MG/DL (ref 70–105)
GLUCOSE SERPL-MCNC: 320 MG/DL (ref 70–105)
GLUCOSE SERPL-MCNC: 320 MG/DL (ref 70–105)
GLUCOSE SERPL-MCNC: 323 MG/DL (ref 70–105)
GLUCOSE SERPL-MCNC: 324 MG/DL (ref 70–105)
GLUCOSE SERPL-MCNC: 329 MG/DL (ref 70–105)
GLUCOSE SERPL-MCNC: 331 MG/DL (ref 70–105)
GLUCOSE SERPL-MCNC: 332 MG/DL (ref 70–105)
GLUCOSE SERPL-MCNC: 333 MG/DL (ref 70–105)
GLUCOSE SERPL-MCNC: 344 MG/DL (ref 70–105)
GLUCOSE SERPL-MCNC: 361 MG/DL (ref 70–105)
GLUCOSE SERPL-MCNC: 40 MG/DL (ref 70–105)
GLUCOSE SERPL-MCNC: 41 MG/DL (ref 70–105)
GLUCOSE SERPL-MCNC: 43 MG/DL (ref 70–105)
GLUCOSE SERPL-MCNC: 44 MG/DL (ref 74–106)
GLUCOSE SERPL-MCNC: 47 MG/DL (ref 70–105)
GLUCOSE SERPL-MCNC: 50 MG/DL (ref 70–105)
GLUCOSE SERPL-MCNC: 52 MG/DL (ref 74–106)
GLUCOSE SERPL-MCNC: 53 MG/DL (ref 70–105)
GLUCOSE SERPL-MCNC: 53 MG/DL (ref 70–105)
GLUCOSE SERPL-MCNC: 54 MG/DL (ref 70–105)
GLUCOSE SERPL-MCNC: 55 MG/DL (ref 70–105)
GLUCOSE SERPL-MCNC: 56 MG/DL (ref 70–105)
GLUCOSE SERPL-MCNC: 57 MG/DL (ref 74–106)
GLUCOSE SERPL-MCNC: 58 MG/DL (ref 70–105)
GLUCOSE SERPL-MCNC: 59 MG/DL (ref 74–106)
GLUCOSE SERPL-MCNC: 60 MG/DL (ref 70–105)
GLUCOSE SERPL-MCNC: 60 MG/DL (ref 70–105)
GLUCOSE SERPL-MCNC: 61 MG/DL (ref 70–105)
GLUCOSE SERPL-MCNC: 62 MG/DL (ref 70–105)
GLUCOSE SERPL-MCNC: 63 MG/DL (ref 70–105)
GLUCOSE SERPL-MCNC: 63 MG/DL (ref 70–105)
GLUCOSE SERPL-MCNC: 63 MG/DL (ref 74–106)
GLUCOSE SERPL-MCNC: 64 MG/DL (ref 70–105)
GLUCOSE SERPL-MCNC: 64 MG/DL (ref 70–105)
GLUCOSE SERPL-MCNC: 64 MG/DL (ref 74–106)
GLUCOSE SERPL-MCNC: 65 MG/DL (ref 70–105)
GLUCOSE SERPL-MCNC: 65 MG/DL (ref 74–106)
GLUCOSE SERPL-MCNC: 66 MG/DL (ref 70–105)
GLUCOSE SERPL-MCNC: 66 MG/DL (ref 70–105)
GLUCOSE SERPL-MCNC: 68 MG/DL (ref 70–105)
GLUCOSE SERPL-MCNC: 69 MG/DL (ref 70–105)
GLUCOSE SERPL-MCNC: 70 MG/DL (ref 70–105)
GLUCOSE SERPL-MCNC: 71 MG/DL (ref 70–105)
GLUCOSE SERPL-MCNC: 72 MG/DL (ref 70–105)
GLUCOSE SERPL-MCNC: 72 MG/DL (ref 70–105)
GLUCOSE SERPL-MCNC: 72 MG/DL (ref 74–106)
GLUCOSE SERPL-MCNC: 73 MG/DL (ref 74–106)
GLUCOSE SERPL-MCNC: 75 MG/DL (ref 74–106)
GLUCOSE SERPL-MCNC: 76 MG/DL (ref 70–105)
GLUCOSE SERPL-MCNC: 77 MG/DL (ref 70–105)
GLUCOSE SERPL-MCNC: 77 MG/DL (ref 70–105)
GLUCOSE SERPL-MCNC: 78 MG/DL (ref 70–105)
GLUCOSE SERPL-MCNC: 78 MG/DL (ref 70–105)
GLUCOSE SERPL-MCNC: 80 MG/DL (ref 70–105)
GLUCOSE SERPL-MCNC: 81 MG/DL (ref 70–105)
GLUCOSE SERPL-MCNC: 81 MG/DL (ref 70–105)
GLUCOSE SERPL-MCNC: 82 MG/DL (ref 70–105)
GLUCOSE SERPL-MCNC: 83 MG/DL (ref 70–105)
GLUCOSE SERPL-MCNC: 84 MG/DL (ref 70–105)
GLUCOSE SERPL-MCNC: 84 MG/DL (ref 70–105)
GLUCOSE SERPL-MCNC: 84 MG/DL (ref 74–106)
GLUCOSE SERPL-MCNC: 84 MG/DL (ref 74–106)
GLUCOSE SERPL-MCNC: 85 MG/DL (ref 70–105)
GLUCOSE SERPL-MCNC: 85 MG/DL (ref 70–105)
GLUCOSE SERPL-MCNC: 85 MG/DL (ref 74–106)
GLUCOSE SERPL-MCNC: 86 MG/DL (ref 70–105)
GLUCOSE SERPL-MCNC: 86 MG/DL (ref 70–105)
GLUCOSE SERPL-MCNC: 86 MG/DL (ref 74–106)
GLUCOSE SERPL-MCNC: 87 MG/DL (ref 70–105)
GLUCOSE SERPL-MCNC: 87 MG/DL (ref 70–105)
GLUCOSE SERPL-MCNC: 87 MG/DL (ref 74–106)
GLUCOSE SERPL-MCNC: 88 MG/DL (ref 70–105)
GLUCOSE SERPL-MCNC: 88 MG/DL (ref 74–106)
GLUCOSE SERPL-MCNC: 89 MG/DL (ref 70–105)
GLUCOSE SERPL-MCNC: 89 MG/DL (ref 74–106)
GLUCOSE SERPL-MCNC: 90 MG/DL (ref 70–105)
GLUCOSE SERPL-MCNC: 91 MG/DL (ref 70–105)
GLUCOSE SERPL-MCNC: 91 MG/DL (ref 70–105)
GLUCOSE SERPL-MCNC: 92 MG/DL (ref 70–105)
GLUCOSE SERPL-MCNC: 92 MG/DL (ref 74–106)
GLUCOSE SERPL-MCNC: 93 MG/DL (ref 70–105)
GLUCOSE SERPL-MCNC: 94 MG/DL (ref 70–105)
GLUCOSE SERPL-MCNC: 95 MG/DL (ref 70–105)
GLUCOSE SERPL-MCNC: 95 MG/DL (ref 74–106)
GLUCOSE SERPL-MCNC: 96 MG/DL (ref 70–105)
GLUCOSE SERPL-MCNC: 97 MG/DL (ref 70–105)
GLUCOSE SERPL-MCNC: 98 MG/DL (ref 70–105)
GLUCOSE SERPL-MCNC: 98 MG/DL (ref 74–106)
GLUCOSE SERPL-MCNC: 99 MG/DL (ref 70–105)
GRAM STN SPEC: ABNORMAL
GROUP & RH: NORMAL
HAV IGM SER QL: NORMAL
HBV CORE IGM SER QL: NORMAL
HBV SURFACE AG SERPL QL IA: NORMAL
HCO3 UR-SCNC: 16.8 MMOL/L (ref 21–28)
HCO3 UR-SCNC: 25.4 MMOL/L (ref 21–28)
HCO3 UR-SCNC: 26.3 MMOL/L (ref 21–28)
HCO3 UR-SCNC: 27.2 MMOL/L (ref 21–28)
HCT VFR BLD AUTO: 14.5 % (ref 40–54)
HCT VFR BLD AUTO: 16.4 % (ref 40–54)
HCT VFR BLD AUTO: 16.9 % (ref 40–54)
HCT VFR BLD AUTO: 17 % (ref 40–54)
HCT VFR BLD AUTO: 17.1 % (ref 40–54)
HCT VFR BLD AUTO: 18 % (ref 40–54)
HCT VFR BLD AUTO: 18.1 % (ref 40–54)
HCT VFR BLD AUTO: 18.4 % (ref 40–54)
HCT VFR BLD AUTO: 18.4 % (ref 40–54)
HCT VFR BLD AUTO: 18.8 % (ref 40–54)
HCT VFR BLD AUTO: 18.9 % (ref 40–54)
HCT VFR BLD AUTO: 19.1 % (ref 40–54)
HCT VFR BLD AUTO: 19.2 % (ref 40–54)
HCT VFR BLD AUTO: 19.4 % (ref 40–54)
HCT VFR BLD AUTO: 19.7 % (ref 40–54)
HCT VFR BLD AUTO: 19.7 % (ref 40–54)
HCT VFR BLD AUTO: 19.8 % (ref 40–54)
HCT VFR BLD AUTO: 20 % (ref 40–54)
HCT VFR BLD AUTO: 20.1 % (ref 40–54)
HCT VFR BLD AUTO: 20.1 % (ref 40–54)
HCT VFR BLD AUTO: 20.2 % (ref 40–54)
HCT VFR BLD AUTO: 20.4 % (ref 40–54)
HCT VFR BLD AUTO: 20.7 % (ref 40–54)
HCT VFR BLD AUTO: 20.7 % (ref 40–54)
HCT VFR BLD AUTO: 21 % (ref 40–54)
HCT VFR BLD AUTO: 21 % (ref 40–54)
HCT VFR BLD AUTO: 21.1 % (ref 40–54)
HCT VFR BLD AUTO: 21.1 % (ref 40–54)
HCT VFR BLD AUTO: 21.2 % (ref 40–54)
HCT VFR BLD AUTO: 21.3 % (ref 40–54)
HCT VFR BLD AUTO: 21.3 % (ref 40–54)
HCT VFR BLD AUTO: 21.6 % (ref 40–54)
HCT VFR BLD AUTO: 21.6 % (ref 40–54)
HCT VFR BLD AUTO: 22 % (ref 40–54)
HCT VFR BLD AUTO: 22 % (ref 40–54)
HCT VFR BLD AUTO: 22.1 % (ref 40–54)
HCT VFR BLD AUTO: 22.3 % (ref 40–54)
HCT VFR BLD AUTO: 22.4 % (ref 40–54)
HCT VFR BLD AUTO: 22.5 % (ref 40–54)
HCT VFR BLD AUTO: 22.6 % (ref 40–54)
HCT VFR BLD AUTO: 22.7 % (ref 40–54)
HCT VFR BLD AUTO: 22.7 % (ref 40–54)
HCT VFR BLD AUTO: 22.8 % (ref 40–54)
HCT VFR BLD AUTO: 22.8 % (ref 40–54)
HCT VFR BLD AUTO: 22.9 % (ref 40–54)
HCT VFR BLD AUTO: 22.9 % (ref 40–54)
HCT VFR BLD AUTO: 23.2 % (ref 40–54)
HCT VFR BLD AUTO: 23.5 % (ref 40–54)
HCT VFR BLD AUTO: 23.7 % (ref 40–54)
HCT VFR BLD AUTO: 23.7 % (ref 40–54)
HCT VFR BLD AUTO: 23.9 % (ref 40–54)
HCT VFR BLD AUTO: 24 % (ref 40–54)
HCT VFR BLD AUTO: 24 % (ref 40–54)
HCT VFR BLD AUTO: 24.1 % (ref 40–54)
HCT VFR BLD AUTO: 24.2 % (ref 40–54)
HCT VFR BLD AUTO: 24.3 % (ref 40–54)
HCT VFR BLD AUTO: 24.4 % (ref 40–54)
HCT VFR BLD AUTO: 24.8 % (ref 40–54)
HCT VFR BLD AUTO: 24.9 % (ref 40–54)
HCT VFR BLD AUTO: 25 % (ref 40–54)
HCT VFR BLD AUTO: 25.4 % (ref 40–54)
HCT VFR BLD AUTO: 26.2 % (ref 40–54)
HCT VFR BLD AUTO: 26.6 % (ref 40–54)
HCT VFR BLD AUTO: 26.6 % (ref 40–54)
HCT VFR BLD AUTO: 27.5 % (ref 40–54)
HCV AB SER QL: NORMAL
HGB BLD-MCNC: 5 G/DL (ref 13.5–18)
HGB BLD-MCNC: 5.3 G/DL (ref 13.5–18)
HGB BLD-MCNC: 5.6 G/DL (ref 13.5–18)
HGB BLD-MCNC: 5.7 G/DL (ref 13.5–18)
HGB BLD-MCNC: 5.9 G/DL (ref 13.5–18)
HGB BLD-MCNC: 6 G/DL (ref 13.5–18)
HGB BLD-MCNC: 6.1 G/DL (ref 13.5–18)
HGB BLD-MCNC: 6.2 G/DL (ref 13.5–18)
HGB BLD-MCNC: 6.2 G/DL (ref 13.5–18)
HGB BLD-MCNC: 6.3 G/DL (ref 13.5–18)
HGB BLD-MCNC: 6.4 G/DL (ref 13.5–18)
HGB BLD-MCNC: 6.5 G/DL (ref 13.5–18)
HGB BLD-MCNC: 6.6 G/DL (ref 13.5–18)
HGB BLD-MCNC: 6.7 G/DL (ref 13.5–18)
HGB BLD-MCNC: 6.8 G/DL (ref 13.5–18)
HGB BLD-MCNC: 6.8 G/DL (ref 13.5–18)
HGB BLD-MCNC: 6.9 G/DL (ref 13.5–18)
HGB BLD-MCNC: 7 G/DL (ref 13.5–18)
HGB BLD-MCNC: 7.1 G/DL (ref 13.5–18)
HGB BLD-MCNC: 7.2 G/DL (ref 13.5–18)
HGB BLD-MCNC: 7.3 G/DL (ref 13.5–18)
HGB BLD-MCNC: 7.4 G/DL (ref 13.5–18)
HGB BLD-MCNC: 7.5 G/DL (ref 13.5–18)
HGB BLD-MCNC: 7.5 G/DL (ref 13.5–18)
HGB BLD-MCNC: 7.6 G/DL (ref 13.5–18)
HGB BLD-MCNC: 7.6 G/DL (ref 13.5–18)
HGB BLD-MCNC: 7.7 G/DL (ref 13.5–18)
HGB BLD-MCNC: 7.8 G/DL (ref 13.5–18)
HGB BLD-MCNC: 7.9 G/DL (ref 13.5–18)
HGB BLD-MCNC: 7.9 G/DL (ref 13.5–18)
HGB BLD-MCNC: 8 G/DL (ref 13.5–18)
HGB BLD-MCNC: 8.1 G/DL (ref 13.5–18)
HGB BLD-MCNC: 8.1 G/DL (ref 13.5–18)
HGB BLD-MCNC: 8.3 G/DL (ref 13.5–18)
HGB BLD-MCNC: 8.4 G/DL (ref 13.5–18)
HGB BLD-MCNC: 8.4 G/DL (ref 13.5–18)
HGB BLD-MCNC: 8.5 G/DL (ref 13.5–18)
HGB BLD-MCNC: 9.1 G/DL (ref 13.5–18)
HGB BLD-MCNC: 9.7 G/DL (ref 13.5–18)
HYPOCHROMIA BLD QL SMEAR: ABNORMAL
IMM GRANULOCYTES # BLD AUTO: 0.1 K/UL (ref 0–0.04)
IMM GRANULOCYTES # BLD AUTO: 0.18 K/UL (ref 0–0.04)
IMM GRANULOCYTES # BLD AUTO: 0.19 K/UL (ref 0–0.04)
IMM GRANULOCYTES # BLD AUTO: 0.19 K/UL (ref 0–0.04)
IMM GRANULOCYTES # BLD AUTO: 0.2 K/UL (ref 0–0.04)
IMM GRANULOCYTES # BLD AUTO: 0.2 K/UL (ref 0–0.04)
IMM GRANULOCYTES # BLD AUTO: 0.21 K/UL (ref 0–0.04)
IMM GRANULOCYTES # BLD AUTO: 0.22 K/UL (ref 0–0.04)
IMM GRANULOCYTES # BLD AUTO: 0.28 K/UL (ref 0–0.04)
IMM GRANULOCYTES # BLD AUTO: 0.29 K/UL (ref 0–0.04)
IMM GRANULOCYTES # BLD AUTO: 0.31 K/UL (ref 0–0.04)
IMM GRANULOCYTES # BLD AUTO: 0.32 K/UL (ref 0–0.04)
IMM GRANULOCYTES # BLD AUTO: 0.33 K/UL (ref 0–0.04)
IMM GRANULOCYTES # BLD AUTO: 0.33 K/UL (ref 0–0.04)
IMM GRANULOCYTES # BLD AUTO: 0.35 K/UL (ref 0–0.04)
IMM GRANULOCYTES # BLD AUTO: 0.36 K/UL (ref 0–0.04)
IMM GRANULOCYTES # BLD AUTO: 0.37 K/UL (ref 0–0.04)
IMM GRANULOCYTES # BLD AUTO: 0.38 K/UL (ref 0–0.04)
IMM GRANULOCYTES # BLD AUTO: 0.4 K/UL (ref 0–0.04)
IMM GRANULOCYTES # BLD AUTO: 0.41 K/UL (ref 0–0.04)
IMM GRANULOCYTES # BLD AUTO: 0.42 K/UL (ref 0–0.04)
IMM GRANULOCYTES # BLD AUTO: 0.45 K/UL (ref 0–0.04)
IMM GRANULOCYTES # BLD AUTO: 0.45 K/UL (ref 0–0.04)
IMM GRANULOCYTES # BLD AUTO: 0.46 K/UL (ref 0–0.04)
IMM GRANULOCYTES # BLD AUTO: 0.5 K/UL (ref 0–0.04)
IMM GRANULOCYTES # BLD AUTO: 0.51 K/UL (ref 0–0.04)
IMM GRANULOCYTES # BLD AUTO: 0.53 K/UL (ref 0–0.04)
IMM GRANULOCYTES # BLD AUTO: 0.53 K/UL (ref 0–0.04)
IMM GRANULOCYTES # BLD AUTO: 0.6 K/UL (ref 0–0.04)
IMM GRANULOCYTES # BLD AUTO: 0.6 K/UL (ref 0–0.04)
IMM GRANULOCYTES # BLD AUTO: 0.61 K/UL (ref 0–0.04)
IMM GRANULOCYTES # BLD AUTO: 0.63 K/UL (ref 0–0.04)
IMM GRANULOCYTES # BLD AUTO: 0.74 K/UL (ref 0–0.04)
IMM GRANULOCYTES # BLD AUTO: 0.76 K/UL (ref 0–0.04)
IMM GRANULOCYTES # BLD AUTO: 0.79 K/UL (ref 0–0.04)
IMM GRANULOCYTES # BLD AUTO: 0.8 K/UL (ref 0–0.04)
IMM GRANULOCYTES # BLD AUTO: 0.89 K/UL (ref 0–0.04)
IMM GRANULOCYTES # BLD AUTO: 0.93 K/UL (ref 0–0.04)
IMM GRANULOCYTES # BLD AUTO: 0.97 K/UL (ref 0–0.04)
IMM GRANULOCYTES # BLD AUTO: 0.97 K/UL (ref 0–0.04)
IMM GRANULOCYTES # BLD AUTO: 1.03 K/UL (ref 0–0.04)
IMM GRANULOCYTES # BLD AUTO: 1.09 K/UL (ref 0–0.04)
IMM GRANULOCYTES # BLD AUTO: 1.27 K/UL (ref 0–0.04)
IMM GRANULOCYTES # BLD AUTO: 1.29 K/UL (ref 0–0.04)
IMM GRANULOCYTES # BLD AUTO: 1.29 K/UL (ref 0–0.04)
IMM GRANULOCYTES # BLD AUTO: 1.38 K/UL (ref 0–0.04)
IMM GRANULOCYTES # BLD AUTO: 1.55 K/UL (ref 0–0.04)
IMM GRANULOCYTES # BLD AUTO: 1.58 K/UL (ref 0–0.04)
IMM GRANULOCYTES # BLD AUTO: 1.66 K/UL (ref 0–0.04)
IMM GRANULOCYTES # BLD AUTO: 1.86 K/UL (ref 0–0.04)
IMM GRANULOCYTES # BLD AUTO: 2.17 K/UL (ref 0–0.04)
IMM GRANULOCYTES # BLD AUTO: 2.47 K/UL (ref 0–0.04)
IMM GRANULOCYTES # BLD AUTO: 2.52 K/UL (ref 0–0.04)
IMM GRANULOCYTES # BLD AUTO: 2.97 K/UL (ref 0–0.04)
IMM GRANULOCYTES # BLD AUTO: 3.12 K/UL (ref 0–0.04)
IMM GRANULOCYTES # BLD AUTO: 4.2 K/UL (ref 0–0.04)
IMM GRANULOCYTES # BLD AUTO: 4.34 K/UL (ref 0–0.04)
IMM GRANULOCYTES NFR BLD: 0.8 % (ref 0–0.4)
IMM GRANULOCYTES NFR BLD: 1.1 % (ref 0–0.4)
IMM GRANULOCYTES NFR BLD: 1.1 % (ref 0–0.4)
IMM GRANULOCYTES NFR BLD: 1.2 % (ref 0–0.4)
IMM GRANULOCYTES NFR BLD: 1.4 % (ref 0–0.4)
IMM GRANULOCYTES NFR BLD: 1.4 % (ref 0–0.4)
IMM GRANULOCYTES NFR BLD: 1.6 % (ref 0–0.4)
IMM GRANULOCYTES NFR BLD: 1.8 % (ref 0–0.4)
IMM GRANULOCYTES NFR BLD: 1.9 % (ref 0–0.4)
IMM GRANULOCYTES NFR BLD: 1.9 % (ref 0–0.4)
IMM GRANULOCYTES NFR BLD: 10.2 % (ref 0–0.4)
IMM GRANULOCYTES NFR BLD: 11.5 % (ref 0–0.4)
IMM GRANULOCYTES NFR BLD: 11.6 % (ref 0–0.4)
IMM GRANULOCYTES NFR BLD: 11.7 % (ref 0–0.4)
IMM GRANULOCYTES NFR BLD: 12.8 % (ref 0–0.4)
IMM GRANULOCYTES NFR BLD: 14 % (ref 0–0.4)
IMM GRANULOCYTES NFR BLD: 18.7 % (ref 0–0.4)
IMM GRANULOCYTES NFR BLD: 2 % (ref 0–0.4)
IMM GRANULOCYTES NFR BLD: 2.1 % (ref 0–0.4)
IMM GRANULOCYTES NFR BLD: 2.2 % (ref 0–0.4)
IMM GRANULOCYTES NFR BLD: 2.2 % (ref 0–0.4)
IMM GRANULOCYTES NFR BLD: 2.4 % (ref 0–0.4)
IMM GRANULOCYTES NFR BLD: 2.6 % (ref 0–0.4)
IMM GRANULOCYTES NFR BLD: 2.6 % (ref 0–0.4)
IMM GRANULOCYTES NFR BLD: 2.7 % (ref 0–0.4)
IMM GRANULOCYTES NFR BLD: 2.8 % (ref 0–0.4)
IMM GRANULOCYTES NFR BLD: 2.8 % (ref 0–0.4)
IMM GRANULOCYTES NFR BLD: 2.9 % (ref 0–0.4)
IMM GRANULOCYTES NFR BLD: 21.2 % (ref 0–0.4)
IMM GRANULOCYTES NFR BLD: 3.2 % (ref 0–0.4)
IMM GRANULOCYTES NFR BLD: 3.2 % (ref 0–0.4)
IMM GRANULOCYTES NFR BLD: 3.3 % (ref 0–0.4)
IMM GRANULOCYTES NFR BLD: 3.4 % (ref 0–0.4)
IMM GRANULOCYTES NFR BLD: 3.5 % (ref 0–0.4)
IMM GRANULOCYTES NFR BLD: 4.1 % (ref 0–0.4)
IMM GRANULOCYTES NFR BLD: 4.1 % (ref 0–0.4)
IMM GRANULOCYTES NFR BLD: 4.3 % (ref 0–0.4)
IMM GRANULOCYTES NFR BLD: 4.5 % (ref 0–0.4)
IMM GRANULOCYTES NFR BLD: 4.6 % (ref 0–0.4)
IMM GRANULOCYTES NFR BLD: 4.7 % (ref 0–0.4)
IMM GRANULOCYTES NFR BLD: 4.8 % (ref 0–0.4)
IMM GRANULOCYTES NFR BLD: 5 % (ref 0–0.4)
IMM GRANULOCYTES NFR BLD: 5.5 % (ref 0–0.4)
IMM GRANULOCYTES NFR BLD: 5.7 % (ref 0–0.4)
IMM GRANULOCYTES NFR BLD: 5.9 % (ref 0–0.4)
IMM GRANULOCYTES NFR BLD: 6.1 % (ref 0–0.4)
IMM GRANULOCYTES NFR BLD: 6.2 % (ref 0–0.4)
IMM GRANULOCYTES NFR BLD: 6.3 % (ref 0–0.4)
IMM GRANULOCYTES NFR BLD: 6.7 % (ref 0–0.4)
IMM GRANULOCYTES NFR BLD: 7.9 % (ref 0–0.4)
IMM GRANULOCYTES NFR BLD: 8.8 % (ref 0–0.4)
IMM GRANULOCYTES NFR BLD: 9 % (ref 0–0.4)
IMM GRANULOCYTES NFR BLD: 9.8 % (ref 0–0.4)
INDIRECT COOMBS GEL: NORMAL
INDIRECT COOMBS: ABNORMAL
INDIRECT COOMBS: NORMAL
INR BLD: 1.22 (ref 0.9–1.1)
INSULIN SERPL-ACNC: NORMAL U[IU]/ML
INTERVENTRICULAR SEPTUM: 1.03 CM (ref 0.6–1.1)
IRON SATN MFR SERPL: 21 % (ref 14–50)
IRON SATN MFR SERPL: 28 % (ref 14–50)
IRON SERPL-MCNC: 27 ΜG/DL (ref 65–175)
IRON SERPL-MCNC: 42 ΜG/DL (ref 65–175)
IVC OSTIUM: 1.8 CM
LAB AP GROSS DESCRIPTION: NORMAL
LAB AP LABORATORY NOTES: NORMAL
LEFT ATRIUM SIZE: 2.5 CM
LEFT INTERNAL DIMENSION IN SYSTOLE: 3.31 CM (ref 2.1–4)
LEFT VENTRICLE DIASTOLIC VOLUME INDEX: 46.13 ML/M2
LEFT VENTRICLE DIASTOLIC VOLUME: 97.8 ML
LEFT VENTRICLE MASS INDEX: 87 G/M2
LEFT VENTRICLE SYSTOLIC VOLUME INDEX: 21 ML/M2
LEFT VENTRICLE SYSTOLIC VOLUME: 44.5 ML
LEFT VENTRICULAR INTERNAL DIMENSION IN DIASTOLE: 4.61 CM (ref 3.5–6)
LEFT VENTRICULAR MASS: 184.17 G
LVOT MG: 2 MMHG
LYMPHOCYTES # BLD AUTO: 0.48 K/UL (ref 1–4.8)
LYMPHOCYTES # BLD AUTO: 0.49 K/UL (ref 1–4.8)
LYMPHOCYTES # BLD AUTO: 0.5 K/UL (ref 1–4.8)
LYMPHOCYTES # BLD AUTO: 0.54 K/UL (ref 1–4.8)
LYMPHOCYTES # BLD AUTO: 0.64 K/UL (ref 1–4.8)
LYMPHOCYTES # BLD AUTO: 0.78 K/UL (ref 1–4.8)
LYMPHOCYTES # BLD AUTO: 0.78 K/UL (ref 1–4.8)
LYMPHOCYTES # BLD AUTO: 0.91 K/UL (ref 1–4.8)
LYMPHOCYTES # BLD AUTO: 0.91 K/UL (ref 1–4.8)
LYMPHOCYTES # BLD AUTO: 0.94 K/UL (ref 1–4.8)
LYMPHOCYTES # BLD AUTO: 0.98 K/UL (ref 1–4.8)
LYMPHOCYTES # BLD AUTO: 1.21 K/UL (ref 1–4.8)
LYMPHOCYTES # BLD AUTO: 1.24 K/UL (ref 1–4.8)
LYMPHOCYTES # BLD AUTO: 1.26 K/UL (ref 1–4.8)
LYMPHOCYTES # BLD AUTO: 1.27 K/UL (ref 1–4.8)
LYMPHOCYTES # BLD AUTO: 1.49 K/UL (ref 1–4.8)
LYMPHOCYTES # BLD AUTO: 1.51 K/UL (ref 1–4.8)
LYMPHOCYTES # BLD AUTO: 1.53 K/UL (ref 1–4.8)
LYMPHOCYTES # BLD AUTO: 1.54 K/UL (ref 1–4.8)
LYMPHOCYTES # BLD AUTO: 1.76 K/UL (ref 1–4.8)
LYMPHOCYTES # BLD AUTO: 1.85 K/UL (ref 1–4.8)
LYMPHOCYTES # BLD AUTO: 1.99 K/UL (ref 1–4.8)
LYMPHOCYTES # BLD AUTO: 2 K/UL (ref 1–4.8)
LYMPHOCYTES # BLD AUTO: 2.01 K/UL (ref 1–4.8)
LYMPHOCYTES # BLD AUTO: 2.03 K/UL (ref 1–4.8)
LYMPHOCYTES # BLD AUTO: 2.1 K/UL (ref 1–4.8)
LYMPHOCYTES # BLD AUTO: 2.29 K/UL (ref 1–4.8)
LYMPHOCYTES # BLD AUTO: 2.33 K/UL (ref 1–4.8)
LYMPHOCYTES # BLD AUTO: 2.43 K/UL (ref 1–4.8)
LYMPHOCYTES # BLD AUTO: 2.46 K/UL (ref 1–4.8)
LYMPHOCYTES # BLD AUTO: 2.53 K/UL (ref 1–4.8)
LYMPHOCYTES # BLD AUTO: 2.54 K/UL (ref 1–4.8)
LYMPHOCYTES # BLD AUTO: 2.54 K/UL (ref 1–4.8)
LYMPHOCYTES # BLD AUTO: 2.6 K/UL (ref 1–4.8)
LYMPHOCYTES # BLD AUTO: 2.62 K/UL (ref 1–4.8)
LYMPHOCYTES # BLD AUTO: 2.83 K/UL (ref 1–4.8)
LYMPHOCYTES # BLD AUTO: 2.97 K/UL (ref 1–4.8)
LYMPHOCYTES # BLD AUTO: 3.11 K/UL (ref 1–4.8)
LYMPHOCYTES # BLD AUTO: 3.18 K/UL (ref 1–4.8)
LYMPHOCYTES # BLD AUTO: 3.2 K/UL (ref 1–4.8)
LYMPHOCYTES # BLD AUTO: 3.24 K/UL (ref 1–4.8)
LYMPHOCYTES # BLD AUTO: 3.25 K/UL (ref 1–4.8)
LYMPHOCYTES # BLD AUTO: 3.25 K/UL (ref 1–4.8)
LYMPHOCYTES # BLD AUTO: 3.28 K/UL (ref 1–4.8)
LYMPHOCYTES # BLD AUTO: 3.29 K/UL (ref 1–4.8)
LYMPHOCYTES # BLD AUTO: 3.32 K/UL (ref 1–4.8)
LYMPHOCYTES # BLD AUTO: 3.33 K/UL (ref 1–4.8)
LYMPHOCYTES # BLD AUTO: 3.34 K/UL (ref 1–4.8)
LYMPHOCYTES # BLD AUTO: 3.39 K/UL (ref 1–4.8)
LYMPHOCYTES # BLD AUTO: 3.59 K/UL (ref 1–4.8)
LYMPHOCYTES # BLD AUTO: 3.6 K/UL (ref 1–4.8)
LYMPHOCYTES # BLD AUTO: 3.61 K/UL (ref 1–4.8)
LYMPHOCYTES # BLD AUTO: 3.63 K/UL (ref 1–4.8)
LYMPHOCYTES # BLD AUTO: 3.74 K/UL (ref 1–4.8)
LYMPHOCYTES # BLD AUTO: 3.95 K/UL (ref 1–4.8)
LYMPHOCYTES # BLD AUTO: 4.08 K/UL (ref 1–4.8)
LYMPHOCYTES # BLD AUTO: 4.17 K/UL (ref 1–4.8)
LYMPHOCYTES # BLD AUTO: 4.68 K/UL (ref 1–4.8)
LYMPHOCYTES # BLD AUTO: 5.76 K/UL (ref 1–4.8)
LYMPHOCYTES NFR BLD AUTO: 10.3 % (ref 27–41)
LYMPHOCYTES NFR BLD AUTO: 10.3 % (ref 27–41)
LYMPHOCYTES NFR BLD AUTO: 10.4 % (ref 27–41)
LYMPHOCYTES NFR BLD AUTO: 12.3 % (ref 27–41)
LYMPHOCYTES NFR BLD AUTO: 12.6 % (ref 27–41)
LYMPHOCYTES NFR BLD AUTO: 13.6 % (ref 27–41)
LYMPHOCYTES NFR BLD AUTO: 13.7 % (ref 27–41)
LYMPHOCYTES NFR BLD AUTO: 13.9 % (ref 27–41)
LYMPHOCYTES NFR BLD AUTO: 14.2 % (ref 27–41)
LYMPHOCYTES NFR BLD AUTO: 14.4 % (ref 27–41)
LYMPHOCYTES NFR BLD AUTO: 14.8 % (ref 27–41)
LYMPHOCYTES NFR BLD AUTO: 15.4 % (ref 27–41)
LYMPHOCYTES NFR BLD AUTO: 15.6 % (ref 27–41)
LYMPHOCYTES NFR BLD AUTO: 16.9 % (ref 27–41)
LYMPHOCYTES NFR BLD AUTO: 17.2 % (ref 27–41)
LYMPHOCYTES NFR BLD AUTO: 17.3 % (ref 27–41)
LYMPHOCYTES NFR BLD AUTO: 17.7 % (ref 27–41)
LYMPHOCYTES NFR BLD AUTO: 18.1 % (ref 27–41)
LYMPHOCYTES NFR BLD AUTO: 18.8 % (ref 27–41)
LYMPHOCYTES NFR BLD AUTO: 19 % (ref 27–41)
LYMPHOCYTES NFR BLD AUTO: 19.2 % (ref 27–41)
LYMPHOCYTES NFR BLD AUTO: 19.3 % (ref 27–41)
LYMPHOCYTES NFR BLD AUTO: 19.5 % (ref 27–41)
LYMPHOCYTES NFR BLD AUTO: 20 % (ref 27–41)
LYMPHOCYTES NFR BLD AUTO: 20.2 % (ref 27–41)
LYMPHOCYTES NFR BLD AUTO: 20.5 % (ref 27–41)
LYMPHOCYTES NFR BLD AUTO: 21.2 % (ref 27–41)
LYMPHOCYTES NFR BLD AUTO: 22.2 % (ref 27–41)
LYMPHOCYTES NFR BLD AUTO: 22.2 % (ref 27–41)
LYMPHOCYTES NFR BLD AUTO: 23.1 % (ref 27–41)
LYMPHOCYTES NFR BLD AUTO: 23.3 % (ref 27–41)
LYMPHOCYTES NFR BLD AUTO: 23.8 % (ref 27–41)
LYMPHOCYTES NFR BLD AUTO: 24.2 % (ref 27–41)
LYMPHOCYTES NFR BLD AUTO: 24.2 % (ref 27–41)
LYMPHOCYTES NFR BLD AUTO: 24.3 % (ref 27–41)
LYMPHOCYTES NFR BLD AUTO: 24.6 % (ref 27–41)
LYMPHOCYTES NFR BLD AUTO: 24.6 % (ref 27–41)
LYMPHOCYTES NFR BLD AUTO: 3.2 % (ref 27–41)
LYMPHOCYTES NFR BLD AUTO: 3.2 % (ref 27–41)
LYMPHOCYTES NFR BLD AUTO: 3.3 % (ref 27–41)
LYMPHOCYTES NFR BLD AUTO: 3.6 % (ref 27–41)
LYMPHOCYTES NFR BLD AUTO: 3.7 % (ref 27–41)
LYMPHOCYTES NFR BLD AUTO: 31 % (ref 27–41)
LYMPHOCYTES NFR BLD AUTO: 4.9 % (ref 27–41)
LYMPHOCYTES NFR BLD AUTO: 5 % (ref 27–41)
LYMPHOCYTES NFR BLD AUTO: 5.4 % (ref 27–41)
LYMPHOCYTES NFR BLD AUTO: 5.4 % (ref 27–41)
LYMPHOCYTES NFR BLD AUTO: 5.8 % (ref 27–41)
LYMPHOCYTES NFR BLD AUTO: 5.8 % (ref 27–41)
LYMPHOCYTES NFR BLD AUTO: 6 % (ref 27–41)
LYMPHOCYTES NFR BLD AUTO: 6.4 % (ref 27–41)
LYMPHOCYTES NFR BLD AUTO: 6.6 % (ref 27–41)
LYMPHOCYTES NFR BLD AUTO: 6.6 % (ref 27–41)
LYMPHOCYTES NFR BLD AUTO: 6.8 % (ref 27–41)
LYMPHOCYTES NFR BLD AUTO: 9 % (ref 27–41)
LYMPHOCYTES NFR BLD AUTO: 9 % (ref 27–41)
LYMPHOCYTES NFR BLD AUTO: 9.4 % (ref 27–41)
LYMPHOCYTES NFR BLD AUTO: 9.6 % (ref 27–41)
LYMPHOCYTES NFR BLD AUTO: 9.9 % (ref 27–41)
LYMPHOCYTES NFR BLD MANUAL: 10 % (ref 27–41)
LYMPHOCYTES NFR BLD MANUAL: 10 % (ref 27–41)
LYMPHOCYTES NFR BLD MANUAL: 11 % (ref 27–41)
LYMPHOCYTES NFR BLD MANUAL: 11 % (ref 27–41)
LYMPHOCYTES NFR BLD MANUAL: 13 % (ref 27–41)
LYMPHOCYTES NFR BLD MANUAL: 15 % (ref 27–41)
LYMPHOCYTES NFR BLD MANUAL: 16 % (ref 27–41)
LYMPHOCYTES NFR BLD MANUAL: 17 % (ref 27–41)
LYMPHOCYTES NFR BLD MANUAL: 17 % (ref 27–41)
LYMPHOCYTES NFR BLD MANUAL: 18 % (ref 27–41)
LYMPHOCYTES NFR BLD MANUAL: 19 % (ref 27–41)
LYMPHOCYTES NFR BLD MANUAL: 2 % (ref 27–41)
LYMPHOCYTES NFR BLD MANUAL: 21 % (ref 27–41)
LYMPHOCYTES NFR BLD MANUAL: 21 % (ref 27–41)
LYMPHOCYTES NFR BLD MANUAL: 22 % (ref 27–41)
LYMPHOCYTES NFR BLD MANUAL: 22 % (ref 27–41)
LYMPHOCYTES NFR BLD MANUAL: 23 % (ref 27–41)
LYMPHOCYTES NFR BLD MANUAL: 28 % (ref 27–41)
LYMPHOCYTES NFR BLD MANUAL: 3 % (ref 27–41)
LYMPHOCYTES NFR BLD MANUAL: 4 % (ref 27–41)
LYMPHOCYTES NFR BLD MANUAL: 5 % (ref 27–41)
LYMPHOCYTES NFR BLD MANUAL: 6 % (ref 27–41)
LYMPHOCYTES NFR BLD MANUAL: 6 % (ref 27–41)
LYMPHOCYTES NFR BLD MANUAL: 7 % (ref 27–41)
LYMPHOCYTES NFR BLD MANUAL: 8 % (ref 27–41)
LYMPHOCYTES NFR BLD MANUAL: 8 % (ref 27–41)
LYMPHOCYTES NFR BLD MANUAL: 9 % (ref 27–41)
MAGNESIUM SERPL-MCNC: 1.9 MG/DL (ref 1.7–2.3)
MAGNESIUM SERPL-MCNC: 2 MG/DL (ref 1.7–2.3)
MAGNESIUM SERPL-MCNC: 2.1 MG/DL (ref 1.7–2.3)
MAGNESIUM SERPL-MCNC: 2.2 MG/DL (ref 1.7–2.3)
MAGNESIUM SERPL-MCNC: 2.4 MG/DL (ref 1.7–2.3)
MCH RBC QN AUTO: 24.7 PG (ref 27–31)
MCH RBC QN AUTO: 28.6 PG (ref 27–31)
MCH RBC QN AUTO: 28.7 PG (ref 27–31)
MCH RBC QN AUTO: 28.7 PG (ref 27–31)
MCH RBC QN AUTO: 29 PG (ref 27–31)
MCH RBC QN AUTO: 29 PG (ref 27–31)
MCH RBC QN AUTO: 29.2 PG (ref 27–31)
MCH RBC QN AUTO: 29.4 PG (ref 27–31)
MCH RBC QN AUTO: 29.5 PG (ref 27–31)
MCH RBC QN AUTO: 29.7 PG (ref 27–31)
MCH RBC QN AUTO: 29.7 PG (ref 27–31)
MCH RBC QN AUTO: 29.8 PG (ref 27–31)
MCH RBC QN AUTO: 29.9 PG (ref 27–31)
MCH RBC QN AUTO: 30 PG (ref 27–31)
MCH RBC QN AUTO: 30 PG (ref 27–31)
MCH RBC QN AUTO: 30.2 PG (ref 27–31)
MCH RBC QN AUTO: 30.3 PG (ref 27–31)
MCH RBC QN AUTO: 30.5 PG (ref 27–31)
MCH RBC QN AUTO: 30.5 PG (ref 27–31)
MCH RBC QN AUTO: 30.6 PG (ref 27–31)
MCH RBC QN AUTO: 30.7 PG (ref 27–31)
MCH RBC QN AUTO: 30.8 PG (ref 27–31)
MCH RBC QN AUTO: 30.8 PG (ref 27–31)
MCH RBC QN AUTO: 30.9 PG (ref 27–31)
MCH RBC QN AUTO: 31 PG (ref 27–31)
MCH RBC QN AUTO: 31.2 PG (ref 27–31)
MCH RBC QN AUTO: 31.2 PG (ref 27–31)
MCH RBC QN AUTO: 31.3 PG (ref 27–31)
MCH RBC QN AUTO: 31.4 PG (ref 27–31)
MCH RBC QN AUTO: 31.5 PG (ref 27–31)
MCH RBC QN AUTO: 31.6 PG (ref 27–31)
MCH RBC QN AUTO: 31.7 PG (ref 27–31)
MCH RBC QN AUTO: 31.7 PG (ref 27–31)
MCH RBC QN AUTO: 31.8 PG (ref 27–31)
MCH RBC QN AUTO: 31.9 PG (ref 27–31)
MCH RBC QN AUTO: 31.9 PG (ref 27–31)
MCH RBC QN AUTO: 32.1 PG (ref 27–31)
MCH RBC QN AUTO: 32.1 PG (ref 27–31)
MCHC RBC AUTO-ENTMCNC: 30.2 G/DL (ref 32–36)
MCHC RBC AUTO-ENTMCNC: 30.6 G/DL (ref 32–36)
MCHC RBC AUTO-ENTMCNC: 31.2 G/DL (ref 32–36)
MCHC RBC AUTO-ENTMCNC: 31.2 G/DL (ref 32–36)
MCHC RBC AUTO-ENTMCNC: 31.6 G/DL (ref 32–36)
MCHC RBC AUTO-ENTMCNC: 31.8 G/DL (ref 32–36)
MCHC RBC AUTO-ENTMCNC: 31.8 G/DL (ref 32–36)
MCHC RBC AUTO-ENTMCNC: 31.9 G/DL (ref 32–36)
MCHC RBC AUTO-ENTMCNC: 31.9 G/DL (ref 32–36)
MCHC RBC AUTO-ENTMCNC: 32 G/DL (ref 32–36)
MCHC RBC AUTO-ENTMCNC: 32 G/DL (ref 32–36)
MCHC RBC AUTO-ENTMCNC: 32.1 G/DL (ref 32–36)
MCHC RBC AUTO-ENTMCNC: 32.3 G/DL (ref 32–36)
MCHC RBC AUTO-ENTMCNC: 32.3 G/DL (ref 32–36)
MCHC RBC AUTO-ENTMCNC: 32.4 G/DL (ref 32–36)
MCHC RBC AUTO-ENTMCNC: 32.4 G/DL (ref 32–36)
MCHC RBC AUTO-ENTMCNC: 32.5 G/DL (ref 32–36)
MCHC RBC AUTO-ENTMCNC: 32.5 G/DL (ref 32–36)
MCHC RBC AUTO-ENTMCNC: 32.7 G/DL (ref 32–36)
MCHC RBC AUTO-ENTMCNC: 32.7 G/DL (ref 32–36)
MCHC RBC AUTO-ENTMCNC: 32.8 G/DL (ref 32–36)
MCHC RBC AUTO-ENTMCNC: 32.9 G/DL (ref 32–36)
MCHC RBC AUTO-ENTMCNC: 33 G/DL (ref 32–36)
MCHC RBC AUTO-ENTMCNC: 33 G/DL (ref 32–36)
MCHC RBC AUTO-ENTMCNC: 33.1 G/DL (ref 32–36)
MCHC RBC AUTO-ENTMCNC: 33.1 G/DL (ref 32–36)
MCHC RBC AUTO-ENTMCNC: 33.2 G/DL (ref 32–36)
MCHC RBC AUTO-ENTMCNC: 33.3 G/DL (ref 32–36)
MCHC RBC AUTO-ENTMCNC: 33.5 G/DL (ref 32–36)
MCHC RBC AUTO-ENTMCNC: 33.5 G/DL (ref 32–36)
MCHC RBC AUTO-ENTMCNC: 33.6 G/DL (ref 32–36)
MCHC RBC AUTO-ENTMCNC: 33.7 G/DL (ref 32–36)
MCHC RBC AUTO-ENTMCNC: 33.8 G/DL (ref 32–36)
MCHC RBC AUTO-ENTMCNC: 33.9 G/DL (ref 32–36)
MCHC RBC AUTO-ENTMCNC: 34.1 G/DL (ref 32–36)
MCHC RBC AUTO-ENTMCNC: 34.3 G/DL (ref 32–36)
MCHC RBC AUTO-ENTMCNC: 34.4 G/DL (ref 32–36)
MCHC RBC AUTO-ENTMCNC: 34.5 G/DL (ref 32–36)
MCHC RBC AUTO-ENTMCNC: 34.5 G/DL (ref 32–36)
MCHC RBC AUTO-ENTMCNC: 34.8 G/DL (ref 32–36)
MCHC RBC AUTO-ENTMCNC: 34.8 G/DL (ref 32–36)
MCHC RBC AUTO-ENTMCNC: 35.1 G/DL (ref 32–36)
MCHC RBC AUTO-ENTMCNC: 35.2 G/DL (ref 32–36)
MCHC RBC AUTO-ENTMCNC: 35.3 G/DL (ref 32–36)
MCHC RBC AUTO-ENTMCNC: 35.6 G/DL (ref 32–36)
MCV RBC AUTO: 100 FL (ref 80–96)
MCV RBC AUTO: 100.9 FL (ref 80–96)
MCV RBC AUTO: 101.4 FL (ref 80–96)
MCV RBC AUTO: 76.7 FL (ref 80–96)
MCV RBC AUTO: 84.4 FL (ref 80–96)
MCV RBC AUTO: 85 FL (ref 80–96)
MCV RBC AUTO: 85.1 FL (ref 80–96)
MCV RBC AUTO: 85.3 FL (ref 80–96)
MCV RBC AUTO: 85.8 FL (ref 80–96)
MCV RBC AUTO: 86 FL (ref 80–96)
MCV RBC AUTO: 86.1 FL (ref 80–96)
MCV RBC AUTO: 86.5 FL (ref 80–96)
MCV RBC AUTO: 86.8 FL (ref 80–96)
MCV RBC AUTO: 87 FL (ref 80–96)
MCV RBC AUTO: 87.1 FL (ref 80–96)
MCV RBC AUTO: 87.2 FL (ref 80–96)
MCV RBC AUTO: 87.7 FL (ref 80–96)
MCV RBC AUTO: 87.9 FL (ref 80–96)
MCV RBC AUTO: 88.5 FL (ref 80–96)
MCV RBC AUTO: 88.9 FL (ref 80–96)
MCV RBC AUTO: 89 FL (ref 80–96)
MCV RBC AUTO: 89.2 FL (ref 80–96)
MCV RBC AUTO: 89.4 FL (ref 80–96)
MCV RBC AUTO: 89.5 FL (ref 80–96)
MCV RBC AUTO: 89.6 FL (ref 80–96)
MCV RBC AUTO: 89.6 FL (ref 80–96)
MCV RBC AUTO: 89.7 FL (ref 80–96)
MCV RBC AUTO: 89.8 FL (ref 80–96)
MCV RBC AUTO: 89.9 FL (ref 80–96)
MCV RBC AUTO: 89.9 FL (ref 80–96)
MCV RBC AUTO: 90.5 FL (ref 80–96)
MCV RBC AUTO: 90.8 FL (ref 80–96)
MCV RBC AUTO: 90.9 FL (ref 80–96)
MCV RBC AUTO: 91 FL (ref 80–96)
MCV RBC AUTO: 91.4 FL (ref 80–96)
MCV RBC AUTO: 91.6 FL (ref 80–96)
MCV RBC AUTO: 92 FL (ref 80–96)
MCV RBC AUTO: 92.3 FL (ref 80–96)
MCV RBC AUTO: 92.3 FL (ref 80–96)
MCV RBC AUTO: 92.4 FL (ref 80–96)
MCV RBC AUTO: 92.6 FL (ref 80–96)
MCV RBC AUTO: 92.9 FL (ref 80–96)
MCV RBC AUTO: 93.5 FL (ref 80–96)
MCV RBC AUTO: 93.5 FL (ref 80–96)
MCV RBC AUTO: 93.6 FL (ref 80–96)
MCV RBC AUTO: 94.2 FL (ref 80–96)
MCV RBC AUTO: 94.3 FL (ref 80–96)
MCV RBC AUTO: 95 FL (ref 80–96)
MCV RBC AUTO: 95.4 FL (ref 80–96)
MCV RBC AUTO: 95.6 FL (ref 80–96)
MCV RBC AUTO: 96.1 FL (ref 80–96)
MCV RBC AUTO: 96.6 FL (ref 80–96)
MCV RBC AUTO: 96.7 FL (ref 80–96)
MCV RBC AUTO: 97.6 FL (ref 80–96)
MCV RBC AUTO: 97.6 FL (ref 80–96)
MCV RBC AUTO: 98.2 FL (ref 80–96)
MCV RBC AUTO: 99.2 FL (ref 80–96)
METAMYELOCYTES NFR BLD MANUAL: 1 %
METAMYELOCYTES NFR BLD MANUAL: 2 %
METAMYELOCYTES NFR BLD MANUAL: 3 %
METAMYELOCYTES NFR BLD MANUAL: 4 %
METAMYELOCYTES NFR BLD MANUAL: 4 %
METAMYELOCYTES NFR BLD MANUAL: 5 %
METAMYELOCYTES NFR BLD MANUAL: 8 %
MICROORGANISM SPEC CULT: ABNORMAL
MONOCYTES # BLD AUTO: 0.45 K/UL (ref 0–0.8)
MONOCYTES # BLD AUTO: 0.61 K/UL (ref 0–0.8)
MONOCYTES # BLD AUTO: 0.62 K/UL (ref 0–0.8)
MONOCYTES # BLD AUTO: 0.84 K/UL (ref 0–0.8)
MONOCYTES # BLD AUTO: 0.87 K/UL (ref 0–0.8)
MONOCYTES # BLD AUTO: 1.08 K/UL (ref 0–0.8)
MONOCYTES # BLD AUTO: 1.11 K/UL (ref 0–0.8)
MONOCYTES # BLD AUTO: 1.12 K/UL (ref 0–0.8)
MONOCYTES # BLD AUTO: 1.15 K/UL (ref 0–0.8)
MONOCYTES # BLD AUTO: 1.18 K/UL (ref 0–0.8)
MONOCYTES # BLD AUTO: 1.25 K/UL (ref 0–0.8)
MONOCYTES # BLD AUTO: 1.25 K/UL (ref 0–0.8)
MONOCYTES # BLD AUTO: 1.26 K/UL (ref 0–0.8)
MONOCYTES # BLD AUTO: 1.28 K/UL (ref 0–0.8)
MONOCYTES # BLD AUTO: 1.29 K/UL (ref 0–0.8)
MONOCYTES # BLD AUTO: 1.3 K/UL (ref 0–0.8)
MONOCYTES # BLD AUTO: 1.31 K/UL (ref 0–0.8)
MONOCYTES # BLD AUTO: 1.31 K/UL (ref 0–0.8)
MONOCYTES # BLD AUTO: 1.32 K/UL (ref 0–0.8)
MONOCYTES # BLD AUTO: 1.32 K/UL (ref 0–0.8)
MONOCYTES # BLD AUTO: 1.35 K/UL (ref 0–0.8)
MONOCYTES # BLD AUTO: 1.36 K/UL (ref 0–0.8)
MONOCYTES # BLD AUTO: 1.39 K/UL (ref 0–0.8)
MONOCYTES # BLD AUTO: 1.4 K/UL (ref 0–0.8)
MONOCYTES # BLD AUTO: 1.41 K/UL (ref 0–0.8)
MONOCYTES # BLD AUTO: 1.42 K/UL (ref 0–0.8)
MONOCYTES # BLD AUTO: 1.43 K/UL (ref 0–0.8)
MONOCYTES # BLD AUTO: 1.43 K/UL (ref 0–0.8)
MONOCYTES # BLD AUTO: 1.45 K/UL (ref 0–0.8)
MONOCYTES # BLD AUTO: 1.47 K/UL (ref 0–0.8)
MONOCYTES # BLD AUTO: 1.48 K/UL (ref 0–0.8)
MONOCYTES # BLD AUTO: 1.49 K/UL (ref 0–0.8)
MONOCYTES # BLD AUTO: 1.5 K/UL (ref 0–0.8)
MONOCYTES # BLD AUTO: 1.56 K/UL (ref 0–0.8)
MONOCYTES # BLD AUTO: 1.56 K/UL (ref 0–0.8)
MONOCYTES # BLD AUTO: 1.57 K/UL (ref 0–0.8)
MONOCYTES # BLD AUTO: 1.59 K/UL (ref 0–0.8)
MONOCYTES # BLD AUTO: 1.67 K/UL (ref 0–0.8)
MONOCYTES # BLD AUTO: 1.67 K/UL (ref 0–0.8)
MONOCYTES # BLD AUTO: 1.68 K/UL (ref 0–0.8)
MONOCYTES # BLD AUTO: 1.71 K/UL (ref 0–0.8)
MONOCYTES # BLD AUTO: 1.75 K/UL (ref 0–0.8)
MONOCYTES # BLD AUTO: 1.76 K/UL (ref 0–0.8)
MONOCYTES # BLD AUTO: 1.76 K/UL (ref 0–0.8)
MONOCYTES # BLD AUTO: 1.82 K/UL (ref 0–0.8)
MONOCYTES # BLD AUTO: 1.83 K/UL (ref 0–0.8)
MONOCYTES # BLD AUTO: 1.83 K/UL (ref 0–0.8)
MONOCYTES # BLD AUTO: 1.84 K/UL (ref 0–0.8)
MONOCYTES # BLD AUTO: 1.88 K/UL (ref 0–0.8)
MONOCYTES # BLD AUTO: 1.9 K/UL (ref 0–0.8)
MONOCYTES # BLD AUTO: 1.9 K/UL (ref 0–0.8)
MONOCYTES # BLD AUTO: 1.93 K/UL (ref 0–0.8)
MONOCYTES # BLD AUTO: 2.13 K/UL (ref 0–0.8)
MONOCYTES # BLD AUTO: 2.28 K/UL (ref 0–0.8)
MONOCYTES # BLD AUTO: 2.31 K/UL (ref 0–0.8)
MONOCYTES # BLD AUTO: 2.45 K/UL (ref 0–0.8)
MONOCYTES # BLD AUTO: 2.71 K/UL (ref 0–0.8)
MONOCYTES NFR BLD AUTO: 10 % (ref 2–6)
MONOCYTES NFR BLD AUTO: 10 % (ref 2–6)
MONOCYTES NFR BLD AUTO: 10.4 % (ref 2–6)
MONOCYTES NFR BLD AUTO: 10.5 % (ref 2–6)
MONOCYTES NFR BLD AUTO: 10.6 % (ref 2–6)
MONOCYTES NFR BLD AUTO: 10.7 % (ref 2–6)
MONOCYTES NFR BLD AUTO: 11 % (ref 2–6)
MONOCYTES NFR BLD AUTO: 11 % (ref 2–6)
MONOCYTES NFR BLD AUTO: 11.2 % (ref 2–6)
MONOCYTES NFR BLD AUTO: 12.5 % (ref 2–6)
MONOCYTES NFR BLD AUTO: 12.6 % (ref 2–6)
MONOCYTES NFR BLD AUTO: 13.1 % (ref 2–6)
MONOCYTES NFR BLD AUTO: 13.4 % (ref 2–6)
MONOCYTES NFR BLD AUTO: 15.2 % (ref 2–6)
MONOCYTES NFR BLD AUTO: 3.5 % (ref 2–6)
MONOCYTES NFR BLD AUTO: 4 % (ref 2–6)
MONOCYTES NFR BLD AUTO: 4.3 % (ref 2–6)
MONOCYTES NFR BLD AUTO: 4.6 % (ref 2–6)
MONOCYTES NFR BLD AUTO: 4.7 % (ref 2–6)
MONOCYTES NFR BLD AUTO: 4.9 % (ref 2–6)
MONOCYTES NFR BLD AUTO: 5.7 % (ref 2–6)
MONOCYTES NFR BLD AUTO: 5.7 % (ref 2–6)
MONOCYTES NFR BLD AUTO: 5.9 % (ref 2–6)
MONOCYTES NFR BLD AUTO: 6.1 % (ref 2–6)
MONOCYTES NFR BLD AUTO: 6.7 % (ref 2–6)
MONOCYTES NFR BLD AUTO: 6.8 % (ref 2–6)
MONOCYTES NFR BLD AUTO: 7.1 % (ref 2–6)
MONOCYTES NFR BLD AUTO: 7.2 % (ref 2–6)
MONOCYTES NFR BLD AUTO: 7.2 % (ref 2–6)
MONOCYTES NFR BLD AUTO: 7.3 % (ref 2–6)
MONOCYTES NFR BLD AUTO: 7.5 % (ref 2–6)
MONOCYTES NFR BLD AUTO: 7.5 % (ref 2–6)
MONOCYTES NFR BLD AUTO: 8 % (ref 2–6)
MONOCYTES NFR BLD AUTO: 8.2 % (ref 2–6)
MONOCYTES NFR BLD AUTO: 8.4 % (ref 2–6)
MONOCYTES NFR BLD AUTO: 8.6 % (ref 2–6)
MONOCYTES NFR BLD AUTO: 8.7 % (ref 2–6)
MONOCYTES NFR BLD AUTO: 8.7 % (ref 2–6)
MONOCYTES NFR BLD AUTO: 8.9 % (ref 2–6)
MONOCYTES NFR BLD AUTO: 8.9 % (ref 2–6)
MONOCYTES NFR BLD AUTO: 9 % (ref 2–6)
MONOCYTES NFR BLD AUTO: 9.2 % (ref 2–6)
MONOCYTES NFR BLD AUTO: 9.3 % (ref 2–6)
MONOCYTES NFR BLD AUTO: 9.4 % (ref 2–6)
MONOCYTES NFR BLD AUTO: 9.5 % (ref 2–6)
MONOCYTES NFR BLD AUTO: 9.6 % (ref 2–6)
MONOCYTES NFR BLD AUTO: 9.6 % (ref 2–6)
MONOCYTES NFR BLD AUTO: 9.7 % (ref 2–6)
MONOCYTES NFR BLD AUTO: 9.7 % (ref 2–6)
MONOCYTES NFR BLD AUTO: 9.8 % (ref 2–6)
MONOCYTES NFR BLD AUTO: 9.9 % (ref 2–6)
MONOCYTES NFR BLD MANUAL: 10 % (ref 2–6)
MONOCYTES NFR BLD MANUAL: 10 % (ref 2–6)
MONOCYTES NFR BLD MANUAL: 11 % (ref 2–6)
MONOCYTES NFR BLD MANUAL: 12 % (ref 2–6)
MONOCYTES NFR BLD MANUAL: 12 % (ref 2–6)
MONOCYTES NFR BLD MANUAL: 14 % (ref 2–6)
MONOCYTES NFR BLD MANUAL: 4 % (ref 2–6)
MONOCYTES NFR BLD MANUAL: 4 % (ref 2–6)
MONOCYTES NFR BLD MANUAL: 5 % (ref 2–6)
MONOCYTES NFR BLD MANUAL: 6 % (ref 2–6)
MONOCYTES NFR BLD MANUAL: 7 % (ref 2–6)
MONOCYTES NFR BLD MANUAL: 8 % (ref 2–6)
MONOCYTES NFR BLD MANUAL: 9 % (ref 2–6)
MPC BLD CALC-MCNC: 10 FL (ref 9.4–12.4)
MPC BLD CALC-MCNC: 10.4 FL (ref 9.4–12.4)
MPC BLD CALC-MCNC: 10.5 FL (ref 9.4–12.4)
MPC BLD CALC-MCNC: 10.6 FL (ref 9.4–12.4)
MPC BLD CALC-MCNC: 10.7 FL (ref 9.4–12.4)
MPC BLD CALC-MCNC: 11.1 FL (ref 9.4–12.4)
MPC BLD CALC-MCNC: 11.1 FL (ref 9.4–12.4)
MPC BLD CALC-MCNC: 11.2 FL (ref 9.4–12.4)
MPC BLD CALC-MCNC: 11.3 FL (ref 9.4–12.4)
MPC BLD CALC-MCNC: 8.2 FL (ref 9.4–12.4)
MPC BLD CALC-MCNC: 8.2 FL (ref 9.4–12.4)
MPC BLD CALC-MCNC: 8.3 FL (ref 9.4–12.4)
MPC BLD CALC-MCNC: 8.4 FL (ref 9.4–12.4)
MPC BLD CALC-MCNC: 8.4 FL (ref 9.4–12.4)
MPC BLD CALC-MCNC: 8.5 FL (ref 9.4–12.4)
MPC BLD CALC-MCNC: 8.5 FL (ref 9.4–12.4)
MPC BLD CALC-MCNC: 8.6 FL (ref 9.4–12.4)
MPC BLD CALC-MCNC: 8.7 FL (ref 9.4–12.4)
MPC BLD CALC-MCNC: 8.8 FL (ref 9.4–12.4)
MPC BLD CALC-MCNC: 8.8 FL (ref 9.4–12.4)
MPC BLD CALC-MCNC: 8.9 FL (ref 9.4–12.4)
MPC BLD CALC-MCNC: 9 FL (ref 9.4–12.4)
MPC BLD CALC-MCNC: 9.1 FL (ref 9.4–12.4)
MPC BLD CALC-MCNC: 9.2 FL (ref 9.4–12.4)
MPC BLD CALC-MCNC: 9.3 FL (ref 9.4–12.4)
MPC BLD CALC-MCNC: 9.4 FL (ref 9.4–12.4)
MPC BLD CALC-MCNC: 9.5 FL (ref 9.4–12.4)
MPC BLD CALC-MCNC: 9.6 FL (ref 9.4–12.4)
MPC BLD CALC-MCNC: 9.7 FL (ref 9.4–12.4)
MPC BLD CALC-MCNC: 9.8 FL (ref 9.4–12.4)
MPC BLD CALC-MCNC: 9.9 FL (ref 9.4–12.4)
MPC BLD CALC-MCNC: 9.9 FL (ref 9.4–12.4)
MV PEAK E VEL: 0.5 M/S
MYELOCYTES NFR BLD MANUAL: 1 %
MYELOCYTES NFR BLD MANUAL: 2 %
MYELOCYTES NFR BLD MANUAL: 3 %
MYELOCYTES NFR BLD MANUAL: 5 %
MYELOCYTES NFR BLD MANUAL: 5 %
MYELOCYTES NFR BLD MANUAL: 8 %
NEUTROPHILS # BLD AUTO: 10.13 K/UL (ref 1.8–7.7)
NEUTROPHILS # BLD AUTO: 10.21 K/UL (ref 1.8–7.7)
NEUTROPHILS # BLD AUTO: 10.34 K/UL (ref 1.8–7.7)
NEUTROPHILS # BLD AUTO: 10.49 K/UL (ref 1.8–7.7)
NEUTROPHILS # BLD AUTO: 10.8 K/UL (ref 1.8–7.7)
NEUTROPHILS # BLD AUTO: 10.83 K/UL (ref 1.8–7.7)
NEUTROPHILS # BLD AUTO: 10.87 K/UL (ref 1.8–7.7)
NEUTROPHILS # BLD AUTO: 10.88 K/UL (ref 1.8–7.7)
NEUTROPHILS # BLD AUTO: 10.88 K/UL (ref 1.8–7.7)
NEUTROPHILS # BLD AUTO: 11.11 K/UL (ref 1.8–7.7)
NEUTROPHILS # BLD AUTO: 11.34 K/UL (ref 1.8–7.7)
NEUTROPHILS # BLD AUTO: 11.37 K/UL (ref 1.8–7.7)
NEUTROPHILS # BLD AUTO: 11.59 K/UL (ref 1.8–7.7)
NEUTROPHILS # BLD AUTO: 11.82 K/UL (ref 1.8–7.7)
NEUTROPHILS # BLD AUTO: 11.91 K/UL (ref 1.8–7.7)
NEUTROPHILS # BLD AUTO: 11.97 K/UL (ref 1.8–7.7)
NEUTROPHILS # BLD AUTO: 12.29 K/UL (ref 1.8–7.7)
NEUTROPHILS # BLD AUTO: 12.67 K/UL (ref 1.8–7.7)
NEUTROPHILS # BLD AUTO: 12.73 K/UL (ref 1.8–7.7)
NEUTROPHILS # BLD AUTO: 12.8 K/UL (ref 1.8–7.7)
NEUTROPHILS # BLD AUTO: 13.07 K/UL (ref 1.8–7.7)
NEUTROPHILS # BLD AUTO: 13.21 K/UL (ref 1.8–7.7)
NEUTROPHILS # BLD AUTO: 13.31 K/UL (ref 1.8–7.7)
NEUTROPHILS # BLD AUTO: 13.57 K/UL (ref 1.8–7.7)
NEUTROPHILS # BLD AUTO: 13.68 K/UL (ref 1.8–7.7)
NEUTROPHILS # BLD AUTO: 14.12 K/UL (ref 1.8–7.7)
NEUTROPHILS # BLD AUTO: 14.14 K/UL (ref 1.8–7.7)
NEUTROPHILS # BLD AUTO: 14.27 K/UL (ref 1.8–7.7)
NEUTROPHILS # BLD AUTO: 14.58 K/UL (ref 1.8–7.7)
NEUTROPHILS # BLD AUTO: 14.66 K/UL (ref 1.8–7.7)
NEUTROPHILS # BLD AUTO: 14.7 K/UL (ref 1.8–7.7)
NEUTROPHILS # BLD AUTO: 15.05 K/UL (ref 1.8–7.7)
NEUTROPHILS # BLD AUTO: 15.38 K/UL (ref 1.8–7.7)
NEUTROPHILS # BLD AUTO: 15.45 K/UL (ref 1.8–7.7)
NEUTROPHILS # BLD AUTO: 15.6 K/UL (ref 1.8–7.7)
NEUTROPHILS # BLD AUTO: 15.61 K/UL (ref 1.8–7.7)
NEUTROPHILS # BLD AUTO: 16.41 K/UL (ref 1.8–7.7)
NEUTROPHILS # BLD AUTO: 16.51 K/UL (ref 1.8–7.7)
NEUTROPHILS # BLD AUTO: 17.29 K/UL (ref 1.8–7.7)
NEUTROPHILS # BLD AUTO: 17.58 K/UL (ref 1.8–7.7)
NEUTROPHILS # BLD AUTO: 18.08 K/UL (ref 1.8–7.7)
NEUTROPHILS # BLD AUTO: 18.1 K/UL (ref 1.8–7.7)
NEUTROPHILS # BLD AUTO: 4.7 K/UL (ref 1.8–7.7)
NEUTROPHILS # BLD AUTO: 7.02 K/UL (ref 1.8–7.7)
NEUTROPHILS # BLD AUTO: 7.67 K/UL (ref 1.8–7.7)
NEUTROPHILS # BLD AUTO: 7.94 K/UL (ref 1.8–7.7)
NEUTROPHILS # BLD AUTO: 7.99 K/UL (ref 1.8–7.7)
NEUTROPHILS # BLD AUTO: 8.19 K/UL (ref 1.8–7.7)
NEUTROPHILS # BLD AUTO: 8.24 K/UL (ref 1.8–7.7)
NEUTROPHILS # BLD AUTO: 8.41 K/UL (ref 1.8–7.7)
NEUTROPHILS # BLD AUTO: 8.46 K/UL (ref 1.8–7.7)
NEUTROPHILS # BLD AUTO: 8.49 K/UL (ref 1.8–7.7)
NEUTROPHILS # BLD AUTO: 8.59 K/UL (ref 1.8–7.7)
NEUTROPHILS # BLD AUTO: 8.6 K/UL (ref 1.8–7.7)
NEUTROPHILS # BLD AUTO: 8.61 K/UL (ref 1.8–7.7)
NEUTROPHILS # BLD AUTO: 9.03 K/UL (ref 1.8–7.7)
NEUTROPHILS # BLD AUTO: 9.45 K/UL (ref 1.8–7.7)
NEUTROPHILS # BLD AUTO: 9.71 K/UL (ref 1.8–7.7)
NEUTROPHILS # BLD AUTO: 9.75 K/UL (ref 1.8–7.7)
NEUTROPHILS NFR BLD AUTO: 50.9 % (ref 53–65)
NEUTROPHILS NFR BLD AUTO: 52.3 % (ref 53–65)
NEUTROPHILS NFR BLD AUTO: 53.9 % (ref 53–65)
NEUTROPHILS NFR BLD AUTO: 54.5 % (ref 53–65)
NEUTROPHILS NFR BLD AUTO: 54.6 % (ref 53–65)
NEUTROPHILS NFR BLD AUTO: 56.1 % (ref 53–65)
NEUTROPHILS NFR BLD AUTO: 56.3 % (ref 53–65)
NEUTROPHILS NFR BLD AUTO: 57.6 % (ref 53–65)
NEUTROPHILS NFR BLD AUTO: 58.3 % (ref 53–65)
NEUTROPHILS NFR BLD AUTO: 58.5 % (ref 53–65)
NEUTROPHILS NFR BLD AUTO: 59 % (ref 53–65)
NEUTROPHILS NFR BLD AUTO: 60.3 % (ref 53–65)
NEUTROPHILS NFR BLD AUTO: 60.3 % (ref 53–65)
NEUTROPHILS NFR BLD AUTO: 60.5 % (ref 53–65)
NEUTROPHILS NFR BLD AUTO: 61 % (ref 53–65)
NEUTROPHILS NFR BLD AUTO: 61.1 % (ref 53–65)
NEUTROPHILS NFR BLD AUTO: 61.8 % (ref 53–65)
NEUTROPHILS NFR BLD AUTO: 62 % (ref 53–65)
NEUTROPHILS NFR BLD AUTO: 62.4 % (ref 53–65)
NEUTROPHILS NFR BLD AUTO: 62.8 % (ref 53–65)
NEUTROPHILS NFR BLD AUTO: 63.1 % (ref 53–65)
NEUTROPHILS NFR BLD AUTO: 63.8 % (ref 53–65)
NEUTROPHILS NFR BLD AUTO: 64.4 % (ref 53–65)
NEUTROPHILS NFR BLD AUTO: 64.5 % (ref 53–65)
NEUTROPHILS NFR BLD AUTO: 65.1 % (ref 53–65)
NEUTROPHILS NFR BLD AUTO: 65.3 % (ref 53–65)
NEUTROPHILS NFR BLD AUTO: 65.6 % (ref 53–65)
NEUTROPHILS NFR BLD AUTO: 66 % (ref 53–65)
NEUTROPHILS NFR BLD AUTO: 66.4 % (ref 53–65)
NEUTROPHILS NFR BLD AUTO: 66.6 % (ref 53–65)
NEUTROPHILS NFR BLD AUTO: 67.2 % (ref 53–65)
NEUTROPHILS NFR BLD AUTO: 67.3 % (ref 53–65)
NEUTROPHILS NFR BLD AUTO: 67.4 % (ref 53–65)
NEUTROPHILS NFR BLD AUTO: 67.7 % (ref 53–65)
NEUTROPHILS NFR BLD AUTO: 68.4 % (ref 53–65)
NEUTROPHILS NFR BLD AUTO: 69.8 % (ref 53–65)
NEUTROPHILS NFR BLD AUTO: 70.6 % (ref 53–65)
NEUTROPHILS NFR BLD AUTO: 70.7 % (ref 53–65)
NEUTROPHILS NFR BLD AUTO: 71.2 % (ref 53–65)
NEUTROPHILS NFR BLD AUTO: 72.4 % (ref 53–65)
NEUTROPHILS NFR BLD AUTO: 72.7 % (ref 53–65)
NEUTROPHILS NFR BLD AUTO: 74.9 % (ref 53–65)
NEUTROPHILS NFR BLD AUTO: 76 % (ref 53–65)
NEUTROPHILS NFR BLD AUTO: 76.8 % (ref 53–65)
NEUTROPHILS NFR BLD AUTO: 77.5 % (ref 53–65)
NEUTROPHILS NFR BLD AUTO: 78 % (ref 53–65)
NEUTROPHILS NFR BLD AUTO: 78 % (ref 53–65)
NEUTROPHILS NFR BLD AUTO: 78.9 % (ref 53–65)
NEUTROPHILS NFR BLD AUTO: 79.6 % (ref 53–65)
NEUTROPHILS NFR BLD AUTO: 79.8 % (ref 53–65)
NEUTROPHILS NFR BLD AUTO: 79.9 % (ref 53–65)
NEUTROPHILS NFR BLD AUTO: 80.4 % (ref 53–65)
NEUTROPHILS NFR BLD AUTO: 80.5 % (ref 53–65)
NEUTROPHILS NFR BLD AUTO: 82.1 % (ref 53–65)
NEUTROPHILS NFR BLD AUTO: 85.5 % (ref 53–65)
NEUTROPHILS NFR BLD AUTO: 85.7 % (ref 53–65)
NEUTROPHILS NFR BLD AUTO: 85.9 % (ref 53–65)
NEUTROPHILS NFR BLD AUTO: 85.9 % (ref 53–65)
NEUTROPHILS NFR BLD AUTO: 86.2 % (ref 53–65)
NEUTS BAND NFR BLD MANUAL: 1 % (ref 1–5)
NEUTS BAND NFR BLD MANUAL: 10 % (ref 1–5)
NEUTS BAND NFR BLD MANUAL: 10 % (ref 1–5)
NEUTS BAND NFR BLD MANUAL: 15 % (ref 1–5)
NEUTS BAND NFR BLD MANUAL: 2 % (ref 1–5)
NEUTS BAND NFR BLD MANUAL: 20 % (ref 1–5)
NEUTS BAND NFR BLD MANUAL: 23 % (ref 1–5)
NEUTS BAND NFR BLD MANUAL: 25 % (ref 1–5)
NEUTS BAND NFR BLD MANUAL: 27 % (ref 1–5)
NEUTS BAND NFR BLD MANUAL: 3 % (ref 1–5)
NEUTS BAND NFR BLD MANUAL: 4 % (ref 1–5)
NEUTS BAND NFR BLD MANUAL: 4 % (ref 1–5)
NEUTS BAND NFR BLD MANUAL: 6 % (ref 1–5)
NEUTS BAND NFR BLD MANUAL: 7 % (ref 1–5)
NEUTS BAND NFR BLD MANUAL: 8 % (ref 1–5)
NEUTS SEG NFR BLD MANUAL: 51 % (ref 50–62)
NEUTS SEG NFR BLD MANUAL: 54 % (ref 50–62)
NEUTS SEG NFR BLD MANUAL: 55 % (ref 50–62)
NEUTS SEG NFR BLD MANUAL: 56 % (ref 50–62)
NEUTS SEG NFR BLD MANUAL: 56 % (ref 50–62)
NEUTS SEG NFR BLD MANUAL: 58 % (ref 50–62)
NEUTS SEG NFR BLD MANUAL: 60 % (ref 50–62)
NEUTS SEG NFR BLD MANUAL: 61 % (ref 50–62)
NEUTS SEG NFR BLD MANUAL: 61 % (ref 50–62)
NEUTS SEG NFR BLD MANUAL: 62 % (ref 50–62)
NEUTS SEG NFR BLD MANUAL: 63 % (ref 50–62)
NEUTS SEG NFR BLD MANUAL: 63 % (ref 50–62)
NEUTS SEG NFR BLD MANUAL: 64 % (ref 50–62)
NEUTS SEG NFR BLD MANUAL: 65 % (ref 50–62)
NEUTS SEG NFR BLD MANUAL: 67 % (ref 50–62)
NEUTS SEG NFR BLD MANUAL: 68 % (ref 50–62)
NEUTS SEG NFR BLD MANUAL: 69 % (ref 50–62)
NEUTS SEG NFR BLD MANUAL: 70 % (ref 50–62)
NEUTS SEG NFR BLD MANUAL: 71 % (ref 50–62)
NEUTS SEG NFR BLD MANUAL: 72 % (ref 50–62)
NEUTS SEG NFR BLD MANUAL: 72 % (ref 50–62)
NEUTS SEG NFR BLD MANUAL: 73 % (ref 50–62)
NEUTS SEG NFR BLD MANUAL: 78 % (ref 50–62)
NEUTS SEG NFR BLD MANUAL: 81 % (ref 50–62)
NEUTS SEG NFR BLD MANUAL: 82 % (ref 50–62)
NEUTS SEG NFR BLD MANUAL: 86 % (ref 50–62)
NRBC # BLD AUTO: 0 X10E3/UL
NRBC # BLD AUTO: 0.02 X10E3/UL
NRBC # BLD AUTO: 0.03 X10E3/UL
NRBC # BLD AUTO: 0.04 X10E3/UL
NRBC BLD MANUAL-RTO: 1 /100 WBC
NRBC, AUTO (.00): 0 %
NRBC, AUTO (.00): 0.1 %
NRBC, AUTO (.00): 0.2 %
OVALOCYTES BLD QL SMEAR: ABNORMAL
PATH REV BLD -IMP: NORMAL
PCO2 BLDA: 42 MMHG (ref 35–48)
PCO2 BLDA: 44 MMHG (ref 35–48)
PCO2 BLDA: 45 MMHG (ref 35–48)
PCO2 BLDA: 51 MMHG (ref 35–48)
PH SMN: 7.19 [PH] (ref 7.35–7.45)
PH SMN: 7.32 [PH] (ref 7.35–7.45)
PH SMN: 7.36 [PH] (ref 7.35–7.45)
PH SMN: 7.42 [PH] (ref 7.35–7.45)
PHOSPHATE SERPL-MCNC: 4 MG/DL (ref 2.5–4.5)
PHOSPHATE SERPL-MCNC: 4.6 MG/DL (ref 2.5–4.5)
PHOSPHATE SERPL-MCNC: 5.2 MG/DL (ref 2.5–4.5)
PHOSPHATE SERPL-MCNC: 6.7 MG/DL (ref 2.5–4.5)
PHOSPHATE SERPL-MCNC: 8.9 MG/DL (ref 2.5–4.5)
PHOSPHATE SERPL-MCNC: >9 MG/DL (ref 2.5–4.5)
PISA TR MAX VEL: 11 M/S
PLATELET # BLD AUTO: 107 K/UL (ref 150–400)
PLATELET # BLD AUTO: 120 K/UL (ref 150–400)
PLATELET # BLD AUTO: 156 K/UL (ref 150–400)
PLATELET # BLD AUTO: 165 K/UL (ref 150–400)
PLATELET # BLD AUTO: 166 K/UL (ref 150–400)
PLATELET # BLD AUTO: 174 K/UL (ref 150–400)
PLATELET # BLD AUTO: 177 K/UL (ref 150–400)
PLATELET # BLD AUTO: 178 K/UL (ref 150–400)
PLATELET # BLD AUTO: 209 K/UL (ref 150–400)
PLATELET # BLD AUTO: 213 K/UL (ref 150–400)
PLATELET # BLD AUTO: 214 K/UL (ref 150–400)
PLATELET # BLD AUTO: 236 K/UL (ref 150–400)
PLATELET # BLD AUTO: 238 K/UL (ref 150–400)
PLATELET # BLD AUTO: 243 K/UL (ref 150–400)
PLATELET # BLD AUTO: 247 K/UL (ref 150–400)
PLATELET # BLD AUTO: 251 K/UL (ref 150–400)
PLATELET # BLD AUTO: 252 K/UL (ref 150–400)
PLATELET # BLD AUTO: 253 K/UL (ref 150–400)
PLATELET # BLD AUTO: 262 K/UL (ref 150–400)
PLATELET # BLD AUTO: 286 K/UL (ref 150–400)
PLATELET # BLD AUTO: 292 K/UL (ref 150–400)
PLATELET # BLD AUTO: 309 K/UL (ref 150–400)
PLATELET # BLD AUTO: 310 K/UL (ref 150–400)
PLATELET # BLD AUTO: 315 K/UL (ref 150–400)
PLATELET # BLD AUTO: 350 K/UL (ref 150–400)
PLATELET # BLD AUTO: 351 K/UL (ref 150–400)
PLATELET # BLD AUTO: 371 K/UL (ref 150–400)
PLATELET # BLD AUTO: 398 K/UL (ref 150–400)
PLATELET # BLD AUTO: 401 K/UL (ref 150–400)
PLATELET # BLD AUTO: 402 K/UL (ref 150–400)
PLATELET # BLD AUTO: 406 K/UL (ref 150–400)
PLATELET # BLD AUTO: 409 K/UL (ref 150–400)
PLATELET # BLD AUTO: 410 K/UL (ref 150–400)
PLATELET # BLD AUTO: 412 K/UL (ref 150–400)
PLATELET # BLD AUTO: 413 K/UL (ref 150–400)
PLATELET # BLD AUTO: 414 K/UL (ref 150–400)
PLATELET # BLD AUTO: 416 K/UL (ref 150–400)
PLATELET # BLD AUTO: 423 K/UL (ref 150–400)
PLATELET # BLD AUTO: 433 K/UL (ref 150–400)
PLATELET # BLD AUTO: 439 K/UL (ref 150–400)
PLATELET # BLD AUTO: 445 K/UL (ref 150–400)
PLATELET # BLD AUTO: 448 K/UL (ref 150–400)
PLATELET # BLD AUTO: 448 K/UL (ref 150–400)
PLATELET # BLD AUTO: 453 K/UL (ref 150–400)
PLATELET # BLD AUTO: 453 K/UL (ref 150–400)
PLATELET # BLD AUTO: 462 K/UL (ref 150–400)
PLATELET # BLD AUTO: 468 K/UL (ref 150–400)
PLATELET # BLD AUTO: 477 K/UL (ref 150–400)
PLATELET # BLD AUTO: 484 K/UL (ref 150–400)
PLATELET # BLD AUTO: 485 K/UL (ref 150–400)
PLATELET # BLD AUTO: 495 K/UL (ref 150–400)
PLATELET # BLD AUTO: 495 K/UL (ref 150–400)
PLATELET # BLD AUTO: 496 K/UL (ref 150–400)
PLATELET # BLD AUTO: 496 K/UL (ref 150–400)
PLATELET # BLD AUTO: 500 K/UL (ref 150–400)
PLATELET # BLD AUTO: 536 K/UL (ref 150–400)
PLATELET # BLD AUTO: 569 K/UL (ref 150–400)
PLATELET # BLD AUTO: 72 K/UL (ref 150–400)
PLATELET # BLD AUTO: 92 K/UL (ref 150–400)
PLATELET MORPHOLOGY: ABNORMAL
PLATELET MORPHOLOGY: NORMAL
PO2 BLDA: 393 MMHG (ref 83–108)
PO2 BLDA: 66 MMHG (ref 83–108)
PO2 BLDA: 75 MMHG (ref 83–108)
PO2 BLDA: 90 MMHG (ref 83–108)
POC BASE EXCESS: -0.3 MMOL/L (ref -2–3)
POC BASE EXCESS: -0.4 MMOL/L (ref -2–3)
POC BASE EXCESS: -11 MMOL/L (ref -2–3)
POC BASE EXCESS: 2.4 MMOL/L (ref -2–3)
POC SATURATED O2: 100 %
POC SATURATED O2: 91 %
POC SATURATED O2: 92 %
POC SATURATED O2: 96 %
POLYCHROMASIA BLD QL SMEAR: ABNORMAL
POTASSIUM SERPL-SCNC: 3.1 MMOL/L (ref 3.5–5.1)
POTASSIUM SERPL-SCNC: 3.2 MMOL/L (ref 3.5–5.1)
POTASSIUM SERPL-SCNC: 3.3 MMOL/L (ref 3.5–5.1)
POTASSIUM SERPL-SCNC: 3.4 MMOL/L (ref 3.5–5.1)
POTASSIUM SERPL-SCNC: 3.4 MMOL/L (ref 3.5–5.1)
POTASSIUM SERPL-SCNC: 3.5 MMOL/L (ref 3.5–5.1)
POTASSIUM SERPL-SCNC: 3.6 MMOL/L (ref 3.5–5.1)
POTASSIUM SERPL-SCNC: 3.7 MMOL/L (ref 3.5–5.1)
POTASSIUM SERPL-SCNC: 3.8 MMOL/L (ref 3.5–5.1)
POTASSIUM SERPL-SCNC: 3.9 MMOL/L (ref 3.5–5.1)
POTASSIUM SERPL-SCNC: 4 MMOL/L (ref 3.5–5.1)
POTASSIUM SERPL-SCNC: 4.1 MMOL/L (ref 3.5–5.1)
POTASSIUM SERPL-SCNC: 4.2 MMOL/L (ref 3.5–5.1)
POTASSIUM SERPL-SCNC: 4.4 MMOL/L (ref 3.5–5.1)
POTASSIUM SERPL-SCNC: 4.5 MMOL/L (ref 3.5–5.1)
POTASSIUM SERPL-SCNC: 4.5 MMOL/L (ref 3.5–5.1)
POTASSIUM SERPL-SCNC: 4.6 MMOL/L (ref 3.5–5.1)
POTASSIUM SERPL-SCNC: 4.6 MMOL/L (ref 3.5–5.1)
POTASSIUM SERPL-SCNC: 4.7 MMOL/L (ref 3.5–5.1)
POTASSIUM SERPL-SCNC: 4.8 MMOL/L (ref 3.5–5.1)
POTASSIUM SERPL-SCNC: 5.1 MMOL/L (ref 3.5–5.1)
POTASSIUM SERPL-SCNC: 5.2 MMOL/L (ref 3.5–5.1)
POTASSIUM SERPL-SCNC: 5.3 MMOL/L (ref 3.5–5.1)
POTASSIUM SERPL-SCNC: 5.3 MMOL/L (ref 3.5–5.1)
POTASSIUM SERPL-SCNC: 5.4 MMOL/L (ref 3.5–5.1)
POTASSIUM SERPL-SCNC: 5.5 MMOL/L (ref 3.5–5.1)
POTASSIUM SERPL-SCNC: 6.2 MMOL/L (ref 3.5–5.1)
POTASSIUM SERPL-SCNC: 6.2 MMOL/L (ref 3.5–5.1)
POTASSIUM SERPL-SCNC: 6.4 MMOL/L (ref 3.5–5.1)
PREALB SERPL NEPH-MCNC: 14 MG/DL (ref 20–40)
PREALB SERPL NEPH-MCNC: 22 MG/DL (ref 20–40)
PREALB SERPL NEPH-MCNC: 3 MG/DL (ref 20–40)
PREALB SERPL NEPH-MCNC: 6 MG/DL (ref 20–40)
PREALB SERPL NEPH-MCNC: 6 MG/DL (ref 20–40)
PROMYELOCYTES NFR BLD MANUAL: 1 %
PROMYELOCYTES NFR BLD MANUAL: 2 %
PROT SERPL-MCNC: 5.6 G/DL (ref 6.4–8.2)
PROT SERPL-MCNC: 5.8 G/DL (ref 6.4–8.2)
PROT SERPL-MCNC: 7 G/DL (ref 6.4–8.2)
PROT SERPL-MCNC: 7.1 G/DL (ref 6.4–8.2)
PROT SERPL-MCNC: 7.4 G/DL (ref 6.4–8.2)
PROT SERPL-MCNC: 7.6 G/DL (ref 6.4–8.2)
PROTHROMBIN TIME: 15.3 SECONDS (ref 11.7–14.7)
RA MAJOR: 4.1 CM
RA PRESSURE: 3 MMHG
RBC # BLD AUTO: 1.67 M/UL (ref 4.6–6.2)
RBC # BLD AUTO: 1.83 M/UL (ref 4.6–6.2)
RBC # BLD AUTO: 1.88 M/UL (ref 4.6–6.2)
RBC # BLD AUTO: 1.93 M/UL (ref 4.6–6.2)
RBC # BLD AUTO: 2 M/UL (ref 4.6–6.2)
RBC # BLD AUTO: 2 M/UL (ref 4.6–6.2)
RBC # BLD AUTO: 2.01 M/UL (ref 4.6–6.2)
RBC # BLD AUTO: 2.07 M/UL (ref 4.6–6.2)
RBC # BLD AUTO: 2.09 M/UL (ref 4.6–6.2)
RBC # BLD AUTO: 2.09 M/UL (ref 4.6–6.2)
RBC # BLD AUTO: 2.1 M/UL (ref 4.6–6.2)
RBC # BLD AUTO: 2.11 M/UL (ref 4.6–6.2)
RBC # BLD AUTO: 2.12 M/UL (ref 4.6–6.2)
RBC # BLD AUTO: 2.13 M/UL (ref 4.6–6.2)
RBC # BLD AUTO: 2.14 M/UL (ref 4.6–6.2)
RBC # BLD AUTO: 2.14 M/UL (ref 4.6–6.2)
RBC # BLD AUTO: 2.15 M/UL (ref 4.6–6.2)
RBC # BLD AUTO: 2.17 M/UL (ref 4.6–6.2)
RBC # BLD AUTO: 2.17 M/UL (ref 4.6–6.2)
RBC # BLD AUTO: 2.2 M/UL (ref 4.6–6.2)
RBC # BLD AUTO: 2.21 M/UL (ref 4.6–6.2)
RBC # BLD AUTO: 2.21 M/UL (ref 4.6–6.2)
RBC # BLD AUTO: 2.26 M/UL (ref 4.6–6.2)
RBC # BLD AUTO: 2.27 M/UL (ref 4.6–6.2)
RBC # BLD AUTO: 2.28 M/UL (ref 4.6–6.2)
RBC # BLD AUTO: 2.29 M/UL (ref 4.6–6.2)
RBC # BLD AUTO: 2.29 M/UL (ref 4.6–6.2)
RBC # BLD AUTO: 2.31 M/UL (ref 4.6–6.2)
RBC # BLD AUTO: 2.33 M/UL (ref 4.6–6.2)
RBC # BLD AUTO: 2.34 M/UL (ref 4.6–6.2)
RBC # BLD AUTO: 2.34 M/UL (ref 4.6–6.2)
RBC # BLD AUTO: 2.35 M/UL (ref 4.6–6.2)
RBC # BLD AUTO: 2.38 M/UL (ref 4.6–6.2)
RBC # BLD AUTO: 2.4 M/UL (ref 4.6–6.2)
RBC # BLD AUTO: 2.41 M/UL (ref 4.6–6.2)
RBC # BLD AUTO: 2.42 M/UL (ref 4.6–6.2)
RBC # BLD AUTO: 2.43 M/UL (ref 4.6–6.2)
RBC # BLD AUTO: 2.44 M/UL (ref 4.6–6.2)
RBC # BLD AUTO: 2.45 M/UL (ref 4.6–6.2)
RBC # BLD AUTO: 2.46 M/UL (ref 4.6–6.2)
RBC # BLD AUTO: 2.52 M/UL (ref 4.6–6.2)
RBC # BLD AUTO: 2.52 M/UL (ref 4.6–6.2)
RBC # BLD AUTO: 2.55 M/UL (ref 4.6–6.2)
RBC # BLD AUTO: 2.6 M/UL (ref 4.6–6.2)
RBC # BLD AUTO: 2.61 M/UL (ref 4.6–6.2)
RBC # BLD AUTO: 2.61 M/UL (ref 4.6–6.2)
RBC # BLD AUTO: 2.62 M/UL (ref 4.6–6.2)
RBC # BLD AUTO: 2.63 M/UL (ref 4.6–6.2)
RBC # BLD AUTO: 2.72 M/UL (ref 4.6–6.2)
RBC # BLD AUTO: 2.75 M/UL (ref 4.6–6.2)
RBC # BLD AUTO: 2.79 M/UL (ref 4.6–6.2)
RBC # BLD AUTO: 2.8 M/UL (ref 4.6–6.2)
RBC # BLD AUTO: 2.92 M/UL (ref 4.6–6.2)
RBC # BLD AUTO: 2.96 M/UL (ref 4.6–6.2)
RBC # BLD AUTO: 3.26 M/UL (ref 4.6–6.2)
RBC MORPH BLD: NORMAL
RH BLD: ABNORMAL
RH BLD: NORMAL
RIGHT VENTRICULAR END-DIASTOLIC DIMENSION: 3.8 CM
SCHISTOCYTES BLD QL AUTO: ABNORMAL
SODIUM SERPL-SCNC: 120 MMOL/L (ref 136–145)
SODIUM SERPL-SCNC: 125 MMOL/L (ref 136–145)
SODIUM SERPL-SCNC: 126 MMOL/L (ref 136–145)
SODIUM SERPL-SCNC: 127 MMOL/L (ref 136–145)
SODIUM SERPL-SCNC: 128 MMOL/L (ref 136–145)
SODIUM SERPL-SCNC: 128 MMOL/L (ref 136–145)
SODIUM SERPL-SCNC: 130 MMOL/L (ref 136–145)
SODIUM SERPL-SCNC: 131 MMOL/L (ref 136–145)
SODIUM SERPL-SCNC: 132 MMOL/L (ref 136–145)
SODIUM SERPL-SCNC: 133 MMOL/L (ref 136–145)
SODIUM SERPL-SCNC: 134 MMOL/L (ref 136–145)
SODIUM SERPL-SCNC: 135 MMOL/L (ref 136–145)
SODIUM SERPL-SCNC: 136 MMOL/L (ref 136–145)
SODIUM SERPL-SCNC: 137 MMOL/L (ref 136–145)
SODIUM SERPL-SCNC: 138 MMOL/L (ref 136–145)
SODIUM SERPL-SCNC: 139 MMOL/L (ref 136–145)
SODIUM SERPL-SCNC: 141 MMOL/L (ref 136–145)
SODIUM SERPL-SCNC: 142 MMOL/L (ref 136–145)
T3RU NFR SERPL: NORMAL %
TARGETS BLD QL SMEAR: ABNORMAL
TIBC SERPL-MCNC: 128 ΜG/DL (ref 250–450)
TIBC SERPL-MCNC: 152 ΜG/DL (ref 250–450)
TOBRAMYCIN SERPL-MCNC: 0.3 ΜG/ML (ref 0–1.9)
TOBRAMYCIN SERPL-MCNC: 0.6 ΜG/ML (ref 0–1.9)
TOBRAMYCIN SERPL-MCNC: 0.7 ΜG/ML (ref 0–1.9)
TOBRAMYCIN SERPL-MCNC: 0.8 ΜG/ML (ref 0–1.9)
TOBRAMYCIN SERPL-MCNC: 0.9 ΜG/ML (ref 0–1.9)
TOBRAMYCIN SERPL-MCNC: 1.2 ΜG/ML (ref 0–1.9)
TOBRAMYCIN SERPL-MCNC: 1.3 ΜG/ML (ref 0–1.9)
TOBRAMYCIN SERPL-MCNC: 1.8 ΜG/ML (ref 0–1.9)
TOBRAMYCIN SERPL-MCNC: <0.3 ΜG/ML (ref 0–1.9)
TOBRAMYCIN SERPL-MCNC: <0.3 ΜG/ML (ref 0–1.9)
TOBRAMYCIN TROUGH SERPL-MCNC: 0.7 ΜG/ML (ref 0–1.9)
TOBRAMYCIN TROUGH SERPL-MCNC: 1.7 ΜG/ML (ref 0–1.9)
TOBRAMYCIN TROUGH SERPL-MCNC: 2.3 ΜG/ML (ref 0–1.9)
TOXIC GRANULES BLD QL SMEAR: ABNORMAL
TOXIC GRANULES BLD QL SMEAR: ABNORMAL
TR MAX PG: 484 MMHG
TRICUSPID ANNULAR PLANE SYSTOLIC EXCURSION: 1.9 CM
TRIGL SERPL-MCNC: 320 MG/DL (ref 35–150)
TRIGL SERPL-MCNC: 325 MG/DL (ref 35–150)
TRIGL SERPL-MCNC: 342 MG/DL (ref 35–150)
TRIGL SERPL-MCNC: 399 MG/DL (ref 35–150)
TV REST PULMONARY ARTERY PRESSURE: 487 MMHG
UNIT NUMBER: NORMAL
VANCOMYCIN SERPL-MCNC: 10.3 ΜG/ML (ref 0–20)
VANCOMYCIN SERPL-MCNC: 12.2 ΜG/ML (ref 0–20)
VANCOMYCIN SERPL-MCNC: 14.8 ΜG/ML (ref 0–20)
VANCOMYCIN SERPL-MCNC: 15.8 ΜG/ML (ref 0–20)
VANCOMYCIN SERPL-MCNC: 9.5 ΜG/ML (ref 0–20)
VANCOMYCIN TROUGH SERPL-MCNC: 21.6 ΜG/ML (ref 10–20)
VERIGENE RESULT: ABNORMAL
VIT B12 SERPL-MCNC: 1144 PG/ML (ref 193–986)
VIT B12 SERPL-MCNC: 1234 PG/ML (ref 193–986)
WBC # BLD AUTO: 12.68 K/UL (ref 4.5–11)
WBC # BLD AUTO: 12.75 K/UL (ref 4.5–11)
WBC # BLD AUTO: 12.92 K/UL (ref 4.5–11)
WBC # BLD AUTO: 13.16 K/UL (ref 4.5–11)
WBC # BLD AUTO: 13.21 K/UL (ref 4.5–11)
WBC # BLD AUTO: 13.42 K/UL (ref 4.5–11)
WBC # BLD AUTO: 13.43 K/UL (ref 4.5–11)
WBC # BLD AUTO: 13.44 K/UL (ref 4.5–11)
WBC # BLD AUTO: 13.56 K/UL (ref 4.5–11)
WBC # BLD AUTO: 13.65 K/UL (ref 4.5–11)
WBC # BLD AUTO: 13.85 K/UL (ref 4.5–11)
WBC # BLD AUTO: 14.04 K/UL (ref 4.5–11)
WBC # BLD AUTO: 14.04 K/UL (ref 4.5–11)
WBC # BLD AUTO: 14.58 K/UL (ref 4.5–11)
WBC # BLD AUTO: 14.65 K/UL (ref 4.5–11)
WBC # BLD AUTO: 14.69 K/UL (ref 4.5–11)
WBC # BLD AUTO: 14.76 K/UL (ref 4.5–11)
WBC # BLD AUTO: 14.84 K/UL (ref 4.5–11)
WBC # BLD AUTO: 14.88 K/UL (ref 4.5–11)
WBC # BLD AUTO: 15.19 K/UL (ref 4.5–11)
WBC # BLD AUTO: 15.39 K/UL (ref 4.5–11)
WBC # BLD AUTO: 15.59 K/UL (ref 4.5–11)
WBC # BLD AUTO: 16.1 K/UL (ref 4.5–11)
WBC # BLD AUTO: 16.56 K/UL (ref 4.5–11)
WBC # BLD AUTO: 16.58 K/UL (ref 4.5–11)
WBC # BLD AUTO: 16.72 K/UL (ref 4.5–11)
WBC # BLD AUTO: 17.04 K/UL (ref 4.5–11)
WBC # BLD AUTO: 17.04 K/UL (ref 4.5–11)
WBC # BLD AUTO: 17.07 K/UL (ref 4.5–11)
WBC # BLD AUTO: 17.08 K/UL (ref 4.5–11)
WBC # BLD AUTO: 17.52 K/UL (ref 4.5–11)
WBC # BLD AUTO: 17.62 K/UL (ref 4.5–11)
WBC # BLD AUTO: 17.75 K/UL (ref 4.5–11)
WBC # BLD AUTO: 17.81 K/UL (ref 4.5–11)
WBC # BLD AUTO: 18.04 K/UL (ref 4.5–11)
WBC # BLD AUTO: 18.47 K/UL (ref 4.5–11)
WBC # BLD AUTO: 18.48 K/UL (ref 4.5–11)
WBC # BLD AUTO: 18.57 K/UL (ref 4.5–11)
WBC # BLD AUTO: 18.76 K/UL (ref 4.5–11)
WBC # BLD AUTO: 18.81 K/UL (ref 4.5–11)
WBC # BLD AUTO: 19.32 K/UL (ref 4.5–11)
WBC # BLD AUTO: 19.4 K/UL (ref 4.5–11)
WBC # BLD AUTO: 19.67 K/UL (ref 4.5–11)
WBC # BLD AUTO: 19.8 K/UL (ref 4.5–11)
WBC # BLD AUTO: 20.55 K/UL (ref 4.5–11)
WBC # BLD AUTO: 20.57 K/UL (ref 4.5–11)
WBC # BLD AUTO: 20.6 K/UL (ref 4.5–11)
WBC # BLD AUTO: 20.65 K/UL (ref 4.5–11)
WBC # BLD AUTO: 20.7 K/UL (ref 4.5–11)
WBC # BLD AUTO: 20.74 K/UL (ref 4.5–11)
WBC # BLD AUTO: 20.95 K/UL (ref 4.5–11)
WBC # BLD AUTO: 21.49 K/UL (ref 4.5–11)
WBC # BLD AUTO: 22.82 K/UL (ref 4.5–11)
WBC # BLD AUTO: 23.21 K/UL (ref 4.5–11)
WBC # BLD AUTO: 23.45 K/UL (ref 4.5–11)
WBC # BLD AUTO: 24.44 K/UL (ref 4.5–11)
WBC # BLD AUTO: 25.38 K/UL (ref 4.5–11)
WBC # BLD AUTO: 27.17 K/UL (ref 4.5–11)
WBC # BLD AUTO: 8.38 K/UL (ref 4.5–11)

## 2022-01-01 PROCEDURE — 25000242 PHARM REV CODE 250 ALT 637 W/ HCPCS: Performed by: INTERNAL MEDICINE

## 2022-01-01 PROCEDURE — 99900035 HC TECH TIME PER 15 MIN (STAT)

## 2022-01-01 PROCEDURE — 11000008

## 2022-01-01 PROCEDURE — 27200966 HC CLOSED SUCTION SYSTEM

## 2022-01-01 PROCEDURE — 63600175 PHARM REV CODE 636 W HCPCS: Performed by: INTERNAL MEDICINE

## 2022-01-01 PROCEDURE — 82962 GLUCOSE BLOOD TEST: CPT

## 2022-01-01 PROCEDURE — 80200 ASSAY OF TOBRAMYCIN: CPT | Performed by: INTERNAL MEDICINE

## 2022-01-01 PROCEDURE — 94003 VENT MGMT INPAT SUBQ DAY: CPT

## 2022-01-01 PROCEDURE — C9399 UNCLASSIFIED DRUGS OR BIOLOG: HCPCS | Performed by: NURSE PRACTITIONER

## 2022-01-01 PROCEDURE — 25000003 PHARM REV CODE 250: Performed by: INTERNAL MEDICINE

## 2022-01-01 PROCEDURE — 11043 DBRDMT MUSC&/FSCA 1ST 20/<: CPT | Mod: 79,XU,, | Performed by: SURGERY

## 2022-01-01 PROCEDURE — 99232 PR SUBSEQUENT HOSPITAL CARE,LEVL II: ICD-10-PCS | Mod: ,,, | Performed by: INTERNAL MEDICINE

## 2022-01-01 PROCEDURE — 27000221 HC OXYGEN, UP TO 24 HOURS

## 2022-01-01 PROCEDURE — 99233 PR SUBSEQUENT HOSPITAL CARE,LEVL III: ICD-10-PCS | Mod: ,,, | Performed by: INTERNAL MEDICINE

## 2022-01-01 PROCEDURE — 11046 DBRDMT MUSC&/FSCA EA ADDL: CPT | Mod: 59,,, | Performed by: NURSE PRACTITIONER

## 2022-01-01 PROCEDURE — 27202728 HC CATH, DWELLING, EXT

## 2022-01-01 PROCEDURE — 99900026 HC AIRWAY MAINTENANCE (STAT)

## 2022-01-01 PROCEDURE — 94761 N-INVAS EAR/PLS OXIMETRY MLT: CPT

## 2022-01-01 PROCEDURE — 92950 HEART/LUNG RESUSCITATION CPR: CPT

## 2022-01-01 PROCEDURE — 25000003 PHARM REV CODE 250: Performed by: NURSE PRACTITIONER

## 2022-01-01 PROCEDURE — 86923 COMPATIBILITY TEST ELECTRIC: CPT | Mod: 91 | Performed by: INTERNAL MEDICINE

## 2022-01-01 PROCEDURE — 63600175 PHARM REV CODE 636 W HCPCS: Performed by: NURSE PRACTITIONER

## 2022-01-01 PROCEDURE — 80048 BASIC METABOLIC PNL TOTAL CA: CPT | Performed by: INTERNAL MEDICINE

## 2022-01-01 PROCEDURE — 80202 ASSAY OF VANCOMYCIN: CPT | Performed by: INTERNAL MEDICINE

## 2022-01-01 PROCEDURE — 80100014 HC HEMODIALYSIS 1:1

## 2022-01-01 PROCEDURE — 99232 SBSQ HOSP IP/OBS MODERATE 35: CPT | Mod: ,,, | Performed by: INTERNAL MEDICINE

## 2022-01-01 PROCEDURE — 94640 AIRWAY INHALATION TREATMENT: CPT

## 2022-01-01 PROCEDURE — 99223 PR INITIAL HOSPITAL CARE,LEVL III: ICD-10-PCS | Mod: ,,, | Performed by: INTERNAL MEDICINE

## 2022-01-01 PROCEDURE — 99233 SBSQ HOSP IP/OBS HIGH 50: CPT | Mod: ,,, | Performed by: INTERNAL MEDICINE

## 2022-01-01 PROCEDURE — 25000003 PHARM REV CODE 250: Performed by: SURGERY

## 2022-01-01 PROCEDURE — 85025 COMPLETE CBC W/AUTO DIFF WBC: CPT | Performed by: INTERNAL MEDICINE

## 2022-01-01 PROCEDURE — 36600 WITHDRAWAL OF ARTERIAL BLOOD: CPT

## 2022-01-01 PROCEDURE — D9220A PRA ANESTHESIA: Mod: CRNA,,, | Performed by: NURSE ANESTHETIST, CERTIFIED REGISTERED

## 2022-01-01 PROCEDURE — 86923 COMPATIBILITY TEST ELECTRIC: CPT | Performed by: INTERNAL MEDICINE

## 2022-01-01 PROCEDURE — 71045 X-RAY EXAM CHEST 1 VIEW: CPT | Mod: TC

## 2022-01-01 PROCEDURE — 11044 DBRDMT BONE 1ST 20 SQ CM/<: CPT | Mod: 79,,, | Performed by: NURSE PRACTITIONER

## 2022-01-01 PROCEDURE — 82607 VITAMIN B-12: CPT | Performed by: INTERNAL MEDICINE

## 2022-01-01 PROCEDURE — 27000716 HC OXISENSOR PROBE, ANY SIZE

## 2022-01-01 PROCEDURE — D9220A PRA ANESTHESIA: ICD-10-PCS | Mod: ,,, | Performed by: ANESTHESIOLOGY

## 2022-01-01 PROCEDURE — P9016 RBC LEUKOCYTES REDUCED: HCPCS | Performed by: INTERNAL MEDICINE

## 2022-01-01 PROCEDURE — 99233 SBSQ HOSP IP/OBS HIGH 50: CPT | Mod: ,,, | Performed by: NURSE PRACTITIONER

## 2022-01-01 PROCEDURE — 87040 BLOOD CULTURE FOR BACTERIA: CPT | Performed by: INTERNAL MEDICINE

## 2022-01-01 PROCEDURE — 99233 PR SUBSEQUENT HOSPITAL CARE,LEVL III: ICD-10-PCS | Mod: ,,, | Performed by: NURSE PRACTITIONER

## 2022-01-01 PROCEDURE — 36415 COLL VENOUS BLD VENIPUNCTURE: CPT | Performed by: INTERNAL MEDICINE

## 2022-01-01 PROCEDURE — 80053 COMPREHEN METABOLIC PANEL: CPT | Performed by: INTERNAL MEDICINE

## 2022-01-01 PROCEDURE — 27000655: Performed by: ANESTHESIOLOGY

## 2022-01-01 PROCEDURE — 85014 HEMATOCRIT: CPT | Performed by: INTERNAL MEDICINE

## 2022-01-01 PROCEDURE — 86922 COMPATIBILITY TEST ANTIGLOB: CPT | Performed by: INTERNAL MEDICINE

## 2022-01-01 PROCEDURE — 99232 SBSQ HOSP IP/OBS MODERATE 35: CPT | Mod: ,,, | Performed by: NURSE PRACTITIONER

## 2022-01-01 PROCEDURE — 99900022

## 2022-01-01 PROCEDURE — 83735 ASSAY OF MAGNESIUM: CPT | Performed by: INTERNAL MEDICINE

## 2022-01-01 PROCEDURE — 25000003 PHARM REV CODE 250: Performed by: NURSE ANESTHETIST, CERTIFIED REGISTERED

## 2022-01-01 PROCEDURE — 80170 ASSAY OF GENTAMICIN: CPT | Performed by: INTERNAL MEDICINE

## 2022-01-01 PROCEDURE — 36415 COLL VENOUS BLD VENIPUNCTURE: CPT | Performed by: NURSE PRACTITIONER

## 2022-01-01 PROCEDURE — 88304 TISSUE EXAM BY PATHOLOGIST: CPT | Mod: SUR | Performed by: SURGERY

## 2022-01-01 PROCEDURE — 86850 RBC ANTIBODY SCREEN: CPT | Performed by: INTERNAL MEDICINE

## 2022-01-01 PROCEDURE — 63600175 PHARM REV CODE 636 W HCPCS: Performed by: SURGERY

## 2022-01-01 PROCEDURE — 87075 CULTR BACTERIA EXCEPT BLOOD: CPT | Performed by: SURGERY

## 2022-01-01 PROCEDURE — 96372 THER/PROPH/DIAG INJ SC/IM: CPT

## 2022-01-01 PROCEDURE — C9399 UNCLASSIFIED DRUGS OR BIOLOG: HCPCS | Performed by: INTERNAL MEDICINE

## 2022-01-01 PROCEDURE — 99291 PR CRITICAL CARE, E/M 30-74 MINUTES: ICD-10-PCS | Mod: ,,, | Performed by: INTERNAL MEDICINE

## 2022-01-01 PROCEDURE — 15100 PR SPLIT GRFT TRUNK,ARM,LEG <100 SQCM: ICD-10-PCS | Mod: 58,,, | Performed by: SURGERY

## 2022-01-01 PROCEDURE — 99232 SBSQ HOSP IP/OBS MODERATE 35: CPT | Mod: 24,,, | Performed by: NURSE PRACTITIONER

## 2022-01-01 PROCEDURE — 94760 N-INVAS EAR/PLS OXIMETRY 1: CPT

## 2022-01-01 PROCEDURE — 87077 CULTURE AEROBIC IDENTIFY: CPT | Performed by: NURSE PRACTITIONER

## 2022-01-01 PROCEDURE — 86900 BLOOD TYPING SEROLOGIC ABO: CPT | Performed by: INTERNAL MEDICINE

## 2022-01-01 PROCEDURE — 11043 DBRDMT MUSC&/FSCA 1ST 20/<: CPT | Mod: ,,, | Performed by: NURSE PRACTITIONER

## 2022-01-01 PROCEDURE — 99232 PR SUBSEQUENT HOSPITAL CARE,LEVL II: ICD-10-PCS | Mod: ,,, | Performed by: NURSE PRACTITIONER

## 2022-01-01 PROCEDURE — 86901 BLOOD TYPING SEROLOGIC RH(D): CPT | Performed by: INTERNAL MEDICINE

## 2022-01-01 PROCEDURE — 85014 HEMATOCRIT: CPT | Performed by: NURSE PRACTITIONER

## 2022-01-01 PROCEDURE — 11047: ICD-10-PCS | Mod: ,,, | Performed by: SURGERY

## 2022-01-01 PROCEDURE — A6212 FOAM DRG <=16 SQ IN W/BORDER: HCPCS

## 2022-01-01 PROCEDURE — 86870 RBC ANTIBODY IDENTIFICATION: CPT | Performed by: INTERNAL MEDICINE

## 2022-01-01 PROCEDURE — 85025 COMPLETE CBC W/AUTO DIFF WBC: CPT | Performed by: STUDENT IN AN ORGANIZED HEALTH CARE EDUCATION/TRAINING PROGRAM

## 2022-01-01 PROCEDURE — 63600175 PHARM REV CODE 636 W HCPCS: Performed by: HOSPITALIST

## 2022-01-01 PROCEDURE — 11004 PR DEBRIDE NECROTIC SKIN/ TISSUE, GENIT & PERINM: ICD-10-PCS | Mod: 58,XU,, | Performed by: SURGERY

## 2022-01-01 PROCEDURE — 86902 BLOOD TYPE ANTIGEN DONOR EA: CPT | Performed by: INTERNAL MEDICINE

## 2022-01-01 PROCEDURE — 36000704 HC OR TIME LEV I 1ST 15 MIN: Performed by: SURGERY

## 2022-01-01 PROCEDURE — 84075 ASSAY ALKALINE PHOSPHATASE: CPT | Performed by: INTERNAL MEDICINE

## 2022-01-01 PROCEDURE — 80048 BASIC METABOLIC PNL TOTAL CA: CPT | Performed by: NURSE PRACTITIONER

## 2022-01-01 PROCEDURE — 27000510 HC BLANKET BAIR HUGGER ANY SIZE: Performed by: ANESTHESIOLOGY

## 2022-01-01 PROCEDURE — 63600175 PHARM REV CODE 636 W HCPCS

## 2022-01-01 PROCEDURE — 36415 COLL VENOUS BLD VENIPUNCTURE: CPT | Performed by: HOSPITALIST

## 2022-01-01 PROCEDURE — 36000707: Performed by: SURGERY

## 2022-01-01 PROCEDURE — 25000003 PHARM REV CODE 250: Performed by: HOSPITALIST

## 2022-01-01 PROCEDURE — 87149 DNA/RNA DIRECT PROBE: CPT | Performed by: INTERNAL MEDICINE

## 2022-01-01 PROCEDURE — 37000009 HC ANESTHESIA EA ADD 15 MINS: Performed by: SURGERY

## 2022-01-01 PROCEDURE — 87077 CULTURE AEROBIC IDENTIFY: CPT | Performed by: INTERNAL MEDICINE

## 2022-01-01 PROCEDURE — 37000008 HC ANESTHESIA 1ST 15 MINUTES: Performed by: SURGERY

## 2022-01-01 PROCEDURE — 36591 DRAW BLOOD OFF VENOUS DEVICE: CPT | Performed by: NURSE PRACTITIONER

## 2022-01-01 PROCEDURE — 80074 ACUTE HEPATITIS PANEL: CPT | Performed by: INTERNAL MEDICINE

## 2022-01-01 PROCEDURE — 63600175 PHARM REV CODE 636 W HCPCS: Performed by: NURSE ANESTHETIST, CERTIFIED REGISTERED

## 2022-01-01 PROCEDURE — D9220A PRA ANESTHESIA: Mod: ,,, | Performed by: ANESTHESIOLOGY

## 2022-01-01 PROCEDURE — 86850 RBC ANTIBODY SCREEN: CPT | Mod: 91 | Performed by: INTERNAL MEDICINE

## 2022-01-01 PROCEDURE — 99291 CRITICAL CARE FIRST HOUR: CPT | Mod: ,,, | Performed by: INTERNAL MEDICINE

## 2022-01-01 PROCEDURE — 27000789 HC KIT, VAC DRESSING BLACK, ANY SIZE

## 2022-01-01 PROCEDURE — 97605 NEG PRS WND THER DME<=50SQCM: CPT | Mod: ,,, | Performed by: SURGERY

## 2022-01-01 PROCEDURE — 87653 STREP B DNA AMP PROBE: CPT | Performed by: NURSE PRACTITIONER

## 2022-01-01 PROCEDURE — 99024 POSTOP FOLLOW-UP VISIT: CPT | Mod: ,,, | Performed by: SURGERY

## 2022-01-01 PROCEDURE — B4185 PARENTERAL SOL 10 GM LIPIDS: HCPCS | Performed by: SURGERY

## 2022-01-01 PROCEDURE — 83540 ASSAY OF IRON: CPT | Performed by: INTERNAL MEDICINE

## 2022-01-01 PROCEDURE — 36569 INSJ PICC 5 YR+ W/O IMAGING: CPT | Mod: 79,LT,, | Performed by: NURSE PRACTITIONER

## 2022-01-01 PROCEDURE — D9220A PRA ANESTHESIA: ICD-10-PCS | Mod: CRNA,,, | Performed by: NURSE ANESTHETIST, CERTIFIED REGISTERED

## 2022-01-01 PROCEDURE — 71045 X-RAY EXAM CHEST 1 VIEW: CPT | Mod: 26,,, | Performed by: RADIOLOGY

## 2022-01-01 PROCEDURE — 71000033 HC RECOVERY, INTIAL HOUR: Performed by: SURGERY

## 2022-01-01 PROCEDURE — 25000003 PHARM REV CODE 250

## 2022-01-01 PROCEDURE — 11042 DBRDMT SUBQ TIS 1ST 20SQCM/<: CPT | Mod: 59,79,, | Performed by: NURSE PRACTITIONER

## 2022-01-01 PROCEDURE — 11044 PR DEBRIDEMENT, SKIN, SUB-Q TISSUE,MUSCLE,BONE,=<20 SQ CM: ICD-10-PCS | Mod: 79,,, | Performed by: SURGERY

## 2022-01-01 PROCEDURE — 87149 DNA/RNA DIRECT PROBE: CPT | Performed by: HOSPITALIST

## 2022-01-01 PROCEDURE — D9220A PRA ANESTHESIA: ICD-10-PCS | Mod: ANES,,, | Performed by: ANESTHESIOLOGY

## 2022-01-01 PROCEDURE — 86901 BLOOD TYPING SEROLOGIC RH(D): CPT | Mod: 91 | Performed by: INTERNAL MEDICINE

## 2022-01-01 PROCEDURE — 97605 PR NEG PRESS WOUND THERAPY (NPWT) W/NON-DISPOSABLE WOUND VAC DEVICE (DME), <=50 CM: ICD-10-PCS | Mod: ,,, | Performed by: SURGERY

## 2022-01-01 PROCEDURE — 84075 ASSAY ALKALINE PHOSPHATASE: CPT | Performed by: SURGERY

## 2022-01-01 PROCEDURE — 82947 ASSAY GLUCOSE BLOOD QUANT: CPT | Performed by: INTERNAL MEDICINE

## 2022-01-01 PROCEDURE — 99232 PR SUBSEQUENT HOSPITAL CARE,LEVL II: ICD-10-PCS | Mod: 24,,, | Performed by: NURSE PRACTITIONER

## 2022-01-01 PROCEDURE — 11043 DBRDMT MUSC&/FSCA 1ST 20/<: CPT | Mod: 58,,, | Performed by: SURGERY

## 2022-01-01 PROCEDURE — 99223 1ST HOSP IP/OBS HIGH 75: CPT | Mod: ,,, | Performed by: INTERNAL MEDICINE

## 2022-01-01 PROCEDURE — 36000705 HC OR TIME LEV I EA ADD 15 MIN: Performed by: SURGERY

## 2022-01-01 PROCEDURE — 36000706: Performed by: SURGERY

## 2022-01-01 PROCEDURE — 87205 SMEAR GRAM STAIN: CPT | Performed by: INTERNAL MEDICINE

## 2022-01-01 PROCEDURE — 97110 THERAPEUTIC EXERCISES: CPT | Mod: CO

## 2022-01-01 PROCEDURE — 27201423 OPTIME MED/SURG SUP & DEVICES STERILE SUPPLY: Performed by: SURGERY

## 2022-01-01 PROCEDURE — 27100005 HC ECG ELECTRODES

## 2022-01-01 PROCEDURE — 86140 C-REACTIVE PROTEIN: CPT | Performed by: NURSE PRACTITIONER

## 2022-01-01 PROCEDURE — 11043 PR DEBRIDEMENT, SKIN, SUB-Q TISSUE,MUSCLE,=<20 SQ CM: ICD-10-PCS | Mod: 79,,, | Performed by: SURGERY

## 2022-01-01 PROCEDURE — P9016 RBC LEUKOCYTES REDUCED: HCPCS | Performed by: SURGERY

## 2022-01-01 PROCEDURE — 36569 PR INSERT PICC W/O SUB-Q PORT >5Y/O: ICD-10-PCS | Mod: 79,LT,, | Performed by: NURSE PRACTITIONER

## 2022-01-01 PROCEDURE — 82728 ASSAY OF FERRITIN: CPT | Performed by: INTERNAL MEDICINE

## 2022-01-01 PROCEDURE — 11044 DEBRIDEMENT: ICD-10-PCS | Mod: 79,,, | Performed by: NURSE PRACTITIONER

## 2022-01-01 PROCEDURE — 87040 BLOOD CULTURE FOR BACTERIA: CPT | Performed by: NURSE PRACTITIONER

## 2022-01-01 PROCEDURE — 11046 PR DEB MUSC/FASCIA ADD-ON: ICD-10-PCS | Mod: ,,, | Performed by: SURGERY

## 2022-01-01 PROCEDURE — 87340 HEPATITIS B SURFACE AG IA: CPT | Performed by: INTERNAL MEDICINE

## 2022-01-01 PROCEDURE — 85610 PROTHROMBIN TIME: CPT | Performed by: INTERNAL MEDICINE

## 2022-01-01 PROCEDURE — B4185 PARENTERAL SOL 10 GM LIPIDS: HCPCS | Performed by: NURSE PRACTITIONER

## 2022-01-01 PROCEDURE — 80048 BASIC METABOLIC PNL TOTAL CA: CPT | Performed by: SURGERY

## 2022-01-01 PROCEDURE — 87075 CULTR BACTERIA EXCEPT BLOOD: CPT | Performed by: NURSE PRACTITIONER

## 2022-01-01 PROCEDURE — 36592 COLLECT BLOOD FROM PICC: CPT | Performed by: INTERNAL MEDICINE

## 2022-01-01 PROCEDURE — P9016 RBC LEUKOCYTES REDUCED: HCPCS

## 2022-01-01 PROCEDURE — 80053 COMPREHEN METABOLIC PANEL: CPT | Performed by: NURSE PRACTITIONER

## 2022-01-01 PROCEDURE — 86900 BLOOD TYPING SEROLOGIC ABO: CPT

## 2022-01-01 PROCEDURE — 43246 PR EGD, FLEX, W/PLCMT, GASTROSTOMY TUBE: ICD-10-PCS | Mod: 79,,, | Performed by: SURGERY

## 2022-01-01 PROCEDURE — 80048 BASIC METABOLIC PNL TOTAL CA: CPT | Performed by: STUDENT IN AN ORGANIZED HEALTH CARE EDUCATION/TRAINING PROGRAM

## 2022-01-01 PROCEDURE — 86922 COMPATIBILITY TEST ANTIGLOB: CPT

## 2022-01-01 PROCEDURE — 15101 SPLT AGRFT T/A/L EA ADDL 100: CPT | Mod: ,,, | Performed by: SURGERY

## 2022-01-01 PROCEDURE — 85651 RBC SED RATE NONAUTOMATED: CPT | Performed by: NURSE PRACTITIONER

## 2022-01-01 PROCEDURE — 28820 AMPUTATION OF TOE: CPT | Mod: T9

## 2022-01-01 PROCEDURE — 27000165 HC TUBE, ETT CUFFED: Performed by: NURSE ANESTHETIST, CERTIFIED REGISTERED

## 2022-01-01 PROCEDURE — 27201111

## 2022-01-01 PROCEDURE — 88304 PR  SURG PATH,LEVEL III: ICD-10-PCS | Mod: 26,XU,, | Performed by: PATHOLOGY

## 2022-01-01 PROCEDURE — D9220A PRA ANESTHESIA: Mod: ANES,,, | Performed by: ANESTHESIOLOGY

## 2022-01-01 PROCEDURE — 11043 PR DEBRIDEMENT, SKIN, SUB-Q TISSUE,MUSCLE,=<20 SQ CM: ICD-10-PCS | Mod: 79,XU,, | Performed by: SURGERY

## 2022-01-01 PROCEDURE — B4185 PARENTERAL SOL 10 GM LIPIDS: HCPCS | Performed by: INTERNAL MEDICINE

## 2022-01-01 PROCEDURE — 11046 DBRDMT MUSC&/FSCA EA ADDL: CPT | Mod: ,,, | Performed by: NURSE PRACTITIONER

## 2022-01-01 PROCEDURE — 86922 COMPATIBILITY TEST ANTIGLOB: CPT | Performed by: SURGERY

## 2022-01-01 PROCEDURE — 82803 BLOOD GASES ANY COMBINATION: CPT

## 2022-01-01 PROCEDURE — 86870 RBC ANTIBODY IDENTIFICATION: CPT | Performed by: SURGERY

## 2022-01-01 PROCEDURE — 27000716 HC OXISENSOR PROBE, ANY SIZE: Performed by: NURSE ANESTHETIST, CERTIFIED REGISTERED

## 2022-01-01 PROCEDURE — 36415 COLL VENOUS BLD VENIPUNCTURE: CPT | Performed by: STUDENT IN AN ORGANIZED HEALTH CARE EDUCATION/TRAINING PROGRAM

## 2022-01-01 PROCEDURE — 36415 COLL VENOUS BLD VENIPUNCTURE: CPT | Performed by: SURGERY

## 2022-01-01 PROCEDURE — 11005 PR DEBRIDE NECROTIC SKIN/ TISSUE, ABD WALL: ICD-10-PCS | Mod: 58,,, | Performed by: SURGERY

## 2022-01-01 PROCEDURE — 99024 PR POST-OP FOLLOW-UP VISIT: ICD-10-PCS | Mod: ,,, | Performed by: SURGERY

## 2022-01-01 PROCEDURE — 93925 US ARTERIAL LOWER EXTREMITY BILAT WITH ABI (XPD): ICD-10-PCS | Mod: 26,,,

## 2022-01-01 PROCEDURE — 87186 SC STD MICRODIL/AGAR DIL: CPT | Performed by: INTERNAL MEDICINE

## 2022-01-01 PROCEDURE — 11047 DEBRIDEMENT: ICD-10-PCS | Mod: 79,,, | Performed by: NURSE PRACTITIONER

## 2022-01-01 PROCEDURE — 99223 PR INITIAL HOSPITAL CARE,LEVL III: ICD-10-PCS | Mod: ,,, | Performed by: NURSE PRACTITIONER

## 2022-01-01 PROCEDURE — 84478 ASSAY OF TRIGLYCERIDES: CPT | Performed by: INTERNAL MEDICINE

## 2022-01-01 PROCEDURE — 11005 DBRDMT SKIN ABDOMINAL WALL: CPT | Mod: 58,,, | Performed by: SURGERY

## 2022-01-01 PROCEDURE — 90935 HEMODIALYSIS ONE EVALUATION: CPT

## 2022-01-01 PROCEDURE — 97165 OT EVAL LOW COMPLEX 30 MIN: CPT

## 2022-01-01 PROCEDURE — 27000655: Performed by: NURSE ANESTHETIST, CERTIFIED REGISTERED

## 2022-01-01 PROCEDURE — 93922 UPR/L XTREMITY ART 2 LEVELS: CPT | Mod: 26,,,

## 2022-01-01 PROCEDURE — 86850 RBC ANTIBODY SCREEN: CPT | Performed by: SURGERY

## 2022-01-01 PROCEDURE — 99226 PR SUBSEQUENT OBSERVATION CARE,LEVEL III: CPT | Mod: ,,, | Performed by: INTERNAL MEDICINE

## 2022-01-01 PROCEDURE — 27202675 HC OSTOMY APPLIANCE PRODUCT

## 2022-01-01 PROCEDURE — 11043 DBRDMT MUSC&/FSCA 1ST 20/<: CPT | Mod: ,,, | Performed by: SURGERY

## 2022-01-01 PROCEDURE — 25000242 PHARM REV CODE 250 ALT 637 W/ HCPCS: Performed by: NURSE PRACTITIONER

## 2022-01-01 PROCEDURE — 11047 DBRDMT BONE EACH ADDL: CPT | Mod: 79,,, | Performed by: NURSE PRACTITIONER

## 2022-01-01 PROCEDURE — 94002 VENT MGMT INPAT INIT DAY: CPT

## 2022-01-01 PROCEDURE — 43246 EGD PLACE GASTROSTOMY TUBE: CPT | Mod: 79,,, | Performed by: SURGERY

## 2022-01-01 PROCEDURE — 27000260 *HC AIRWAY ORAL: Performed by: NURSE ANESTHETIST, CERTIFIED REGISTERED

## 2022-01-01 PROCEDURE — 27000284 HC CANNULA NASAL: Performed by: ANESTHESIOLOGY

## 2022-01-01 PROCEDURE — 11046 DBRDMT MUSC&/FSCA EA ADDL: CPT | Mod: ,,, | Performed by: SURGERY

## 2022-01-01 PROCEDURE — 11045 DEBRIDEMENT: ICD-10-PCS | Mod: 59,79,, | Performed by: NURSE PRACTITIONER

## 2022-01-01 PROCEDURE — 87186 SC STD MICRODIL/AGAR DIL: CPT | Performed by: NURSE PRACTITIONER

## 2022-01-01 PROCEDURE — 82746 ASSAY OF FOLIC ACID SERUM: CPT | Performed by: INTERNAL MEDICINE

## 2022-01-01 PROCEDURE — 88311 SURGICAL PATHOLOGY: ICD-10-PCS | Mod: 26,,, | Performed by: PATHOLOGY

## 2022-01-01 PROCEDURE — 87070 CULTURE OTHR SPECIMN AEROBIC: CPT | Performed by: INTERNAL MEDICINE

## 2022-01-01 PROCEDURE — D9220A PRA ANESTHESIA: ICD-10-PCS | Mod: ANES,ICN,, | Performed by: ANESTHESIOLOGY

## 2022-01-01 PROCEDURE — 11042 DEBRIDEMENT: ICD-10-PCS | Mod: 59,79,, | Performed by: NURSE PRACTITIONER

## 2022-01-01 PROCEDURE — 87070 CULTURE OTHR SPECIMN AEROBIC: CPT | Performed by: SURGERY

## 2022-01-01 PROCEDURE — 27000510 HC BLANKET BAIR HUGGER ANY SIZE: Performed by: NURSE ANESTHETIST, CERTIFIED REGISTERED

## 2022-01-01 PROCEDURE — 11044 DBRDMT BONE 1ST 20 SQ CM/<: CPT | Mod: 79,,, | Performed by: SURGERY

## 2022-01-01 PROCEDURE — 84478 ASSAY OF TRIGLYCERIDES: CPT | Performed by: SURGERY

## 2022-01-01 PROCEDURE — 87804 INFLUENZA ASSAY W/OPTIC: CPT | Performed by: INTERNAL MEDICINE

## 2022-01-01 PROCEDURE — 11047 DBRDMT BONE EACH ADDL: CPT | Mod: ,,, | Performed by: SURGERY

## 2022-01-01 PROCEDURE — 93925 LOWER EXTREMITY STUDY: CPT | Mod: 26,,,

## 2022-01-01 PROCEDURE — 88304 TISSUE EXAM BY PATHOLOGIST: CPT | Mod: 26,,, | Performed by: PATHOLOGY

## 2022-01-01 PROCEDURE — 86705 HEP B CORE ANTIBODY IGM: CPT | Performed by: INTERNAL MEDICINE

## 2022-01-01 PROCEDURE — 85060 PATH REVIEW OF BLOOD SMEAR, LAB GENERATED: ICD-10-PCS | Mod: ,,, | Performed by: CLINICAL MEDICAL LABORATORY

## 2022-01-01 PROCEDURE — 28820 AMPUTATION OF TOE: CPT | Mod: T9,79,, | Performed by: SURGERY

## 2022-01-01 PROCEDURE — 97110 THERAPEUTIC EXERCISES: CPT

## 2022-01-01 PROCEDURE — 82040 ASSAY OF SERUM ALBUMIN: CPT | Performed by: INTERNAL MEDICINE

## 2022-01-01 PROCEDURE — 85025 COMPLETE CBC W/AUTO DIFF WBC: CPT | Performed by: NURSE PRACTITIONER

## 2022-01-01 PROCEDURE — 71045 XR CHEST 1 VIEW: ICD-10-PCS | Mod: 26,,, | Performed by: RADIOLOGY

## 2022-01-01 PROCEDURE — 86900 BLOOD TYPING SEROLOGIC ABO: CPT | Mod: 91 | Performed by: INTERNAL MEDICINE

## 2022-01-01 PROCEDURE — 36591 DRAW BLOOD OFF VENOUS DEVICE: CPT | Performed by: INTERNAL MEDICINE

## 2022-01-01 PROCEDURE — 11046 PR DEB MUSC/FASCIA ADD-ON: ICD-10-PCS | Mod: 59,,, | Performed by: NURSE PRACTITIONER

## 2022-01-01 PROCEDURE — 28820 PR AMPUTATION TOE,MT-P JT: ICD-10-PCS | Mod: T9,79,, | Performed by: SURGERY

## 2022-01-01 PROCEDURE — 86870 RBC ANTIBODY IDENTIFICATION: CPT

## 2022-01-01 PROCEDURE — 87040 BLOOD CULTURE FOR BACTERIA: CPT | Performed by: HOSPITALIST

## 2022-01-01 PROCEDURE — 93922 US ARTERIAL LOWER EXTREMITY BILAT WITH ABI (XPD): ICD-10-PCS | Mod: 26,,,

## 2022-01-01 PROCEDURE — 27000676 HC TUBING PRIMARY PLUMSET

## 2022-01-01 PROCEDURE — 99291 CRITICAL CARE FIRST HOUR: CPT | Mod: ,,, | Performed by: NURSE PRACTITIONER

## 2022-01-01 PROCEDURE — 85025 COMPLETE CBC W/AUTO DIFF WBC: CPT | Performed by: SURGERY

## 2022-01-01 PROCEDURE — 11045 DBRDMT SUBQ TISS EACH ADDL: CPT | Mod: 59,79,, | Performed by: NURSE PRACTITIONER

## 2022-01-01 PROCEDURE — 80048 BASIC METABOLIC PNL TOTAL CA: CPT | Mod: XB | Performed by: INTERNAL MEDICINE

## 2022-01-01 PROCEDURE — 99291 PR CRITICAL CARE, E/M 30-74 MINUTES: ICD-10-PCS | Mod: ,,, | Performed by: NURSE PRACTITIONER

## 2022-01-01 PROCEDURE — 99226 PR SUBSEQUENT OBSERVATION CARE,LEVEL III: ICD-10-PCS | Mod: ,,, | Performed by: INTERNAL MEDICINE

## 2022-01-01 PROCEDURE — 86850 RBC ANTIBODY SCREEN: CPT

## 2022-01-01 PROCEDURE — 11043 DEBRIDEMENT: ICD-10-PCS | Mod: ,,, | Performed by: NURSE PRACTITIONER

## 2022-01-01 PROCEDURE — 88304 TISSUE EXAM BY PATHOLOGIST: CPT | Mod: 26,XU,, | Performed by: PATHOLOGY

## 2022-01-01 PROCEDURE — 27000689 HC BLADE LARYNGOSCOPE ANY SIZE: Performed by: NURSE ANESTHETIST, CERTIFIED REGISTERED

## 2022-01-01 PROCEDURE — 27000716 HC OXISENSOR PROBE, ANY SIZE: Performed by: ANESTHESIOLOGY

## 2022-01-01 PROCEDURE — G0257 UNSCHED DIALYSIS ESRD PT HOS: HCPCS

## 2022-01-01 PROCEDURE — 85060 BLOOD SMEAR INTERPRETATION: CPT | Mod: ,,, | Performed by: CLINICAL MEDICAL LABORATORY

## 2022-01-01 PROCEDURE — D9220A PRA ANESTHESIA: Mod: ANES,ICN,, | Performed by: ANESTHESIOLOGY

## 2022-01-01 PROCEDURE — 88311 DECALCIFY TISSUE: CPT | Mod: 26,,, | Performed by: PATHOLOGY

## 2022-01-01 PROCEDURE — 11043 PR DEBRIDEMENT, SKIN, SUB-Q TISSUE,MUSCLE,=<20 SQ CM: ICD-10-PCS | Mod: ,,, | Performed by: SURGERY

## 2022-01-01 PROCEDURE — 27200155 *HC SET PRIMARY NONVENTED

## 2022-01-01 PROCEDURE — 36591 DRAW BLOOD OFF VENOUS DEVICE: CPT | Performed by: SURGERY

## 2022-01-01 PROCEDURE — 84134 ASSAY OF PREALBUMIN: CPT | Performed by: NURSE PRACTITIONER

## 2022-01-01 PROCEDURE — 87070 CULTURE OTHR SPECIMN AEROBIC: CPT | Performed by: NURSE PRACTITIONER

## 2022-01-01 PROCEDURE — 36430 TRANSFUSION BLD/BLD COMPNT: CPT

## 2022-01-01 PROCEDURE — 84100 ASSAY OF PHOSPHORUS: CPT | Performed by: INTERNAL MEDICINE

## 2022-01-01 PROCEDURE — 11043 DBRDMT MUSC&/FSCA 1ST 20/<: CPT

## 2022-01-01 PROCEDURE — 49002 REOPENING OF ABDOMEN: CPT | Mod: 58,52,, | Performed by: SURGERY

## 2022-01-01 PROCEDURE — 11043 DBRDMT MUSC&/FSCA 1ST 20/<: CPT | Mod: 79,,, | Performed by: SURGERY

## 2022-01-01 PROCEDURE — 15101 PR SPLIT GRFT,TRUNK,ARM,LEG EA 100 SQCM: ICD-10-PCS | Mod: ,,, | Performed by: SURGERY

## 2022-01-01 PROCEDURE — 84100 ASSAY OF PHOSPHORUS: CPT | Performed by: NURSE PRACTITIONER

## 2022-01-01 PROCEDURE — 93925 LOWER EXTREMITY STUDY: CPT | Mod: TC

## 2022-01-01 PROCEDURE — 15100 SPLT AGRFT T/A/L 1ST 100SQCM: CPT | Mod: 58,,, | Performed by: SURGERY

## 2022-01-01 PROCEDURE — 49002 PR REOPEN RECENT ABD EXPLORATORY: ICD-10-PCS | Mod: 58,52,, | Performed by: SURGERY

## 2022-01-01 PROCEDURE — 83735 ASSAY OF MAGNESIUM: CPT | Performed by: NURSE PRACTITIONER

## 2022-01-01 PROCEDURE — 99223 1ST HOSP IP/OBS HIGH 75: CPT | Mod: ,,, | Performed by: NURSE PRACTITIONER

## 2022-01-01 PROCEDURE — 11043 PR DEBRIDEMENT, SKIN, SUB-Q TISSUE,MUSCLE,=<20 SQ CM: ICD-10-PCS | Mod: 58,,, | Performed by: SURGERY

## 2022-01-01 PROCEDURE — 87149 DNA/RNA DIRECT PROBE: CPT | Performed by: NURSE PRACTITIONER

## 2022-01-01 PROCEDURE — 11004 DBRDMT SKIN XTRNL GENT&PER: CPT | Mod: 58,XU,, | Performed by: SURGERY

## 2022-01-01 RX ORDER — PHENYLEPHRINE HYDROCHLORIDE 10 MG/ML
INJECTION INTRAVENOUS
Status: DISCONTINUED | OUTPATIENT
Start: 2022-01-01 | End: 2022-01-01

## 2022-01-01 RX ORDER — ROCURONIUM BROMIDE 10 MG/ML
INJECTION, SOLUTION INTRAVENOUS
Status: DISCONTINUED | OUTPATIENT
Start: 2022-01-01 | End: 2022-01-01

## 2022-01-01 RX ORDER — MEPERIDINE HYDROCHLORIDE 25 MG/ML
25 INJECTION INTRAMUSCULAR; INTRAVENOUS; SUBCUTANEOUS EVERY 10 MIN PRN
Status: DISCONTINUED | OUTPATIENT
Start: 2022-01-01 | End: 2022-01-01 | Stop reason: HOSPADM

## 2022-01-01 RX ORDER — OXYCODONE HYDROCHLORIDE 5 MG/1
5 TABLET ORAL
Status: DISCONTINUED | OUTPATIENT
Start: 2022-01-01 | End: 2022-01-01 | Stop reason: HOSPADM

## 2022-01-01 RX ORDER — POTASSIUM IODIDE 1 G/ML
5 SOLUTION ORAL 4 TIMES DAILY
Status: DISCONTINUED | OUTPATIENT
Start: 2022-01-01 | End: 2022-06-26 | Stop reason: HOSPADM

## 2022-01-01 RX ORDER — HYDROCODONE BITARTRATE AND ACETAMINOPHEN 500; 5 MG/1; MG/1
TABLET ORAL
Status: DISCONTINUED | OUTPATIENT
Start: 2022-01-01 | End: 2022-01-01

## 2022-01-01 RX ORDER — MIDAZOLAM HYDROCHLORIDE 1 MG/ML
INJECTION INTRAMUSCULAR; INTRAVENOUS
Status: DISCONTINUED | OUTPATIENT
Start: 2022-01-01 | End: 2022-01-01

## 2022-01-01 RX ORDER — MORPHINE SULFATE 4 MG/ML
4 INJECTION, SOLUTION INTRAMUSCULAR; INTRAVENOUS EVERY 4 HOURS PRN
Status: DISCONTINUED | OUTPATIENT
Start: 2022-01-01 | End: 2022-01-01

## 2022-01-01 RX ORDER — PREDNISONE 10 MG/1
10 TABLET ORAL DAILY
Status: DISCONTINUED | OUTPATIENT
Start: 2022-01-01 | End: 2022-01-01

## 2022-01-01 RX ORDER — HEPARIN SODIUM 1000 [USP'U]/ML
4000 INJECTION, SOLUTION INTRAVENOUS; SUBCUTANEOUS ONCE
Status: COMPLETED | OUTPATIENT
Start: 2022-01-01 | End: 2022-01-01

## 2022-01-01 RX ORDER — HYDROMORPHONE HYDROCHLORIDE 2 MG/ML
0.5 INJECTION, SOLUTION INTRAMUSCULAR; INTRAVENOUS; SUBCUTANEOUS EVERY 5 MIN PRN
Status: DISCONTINUED | OUTPATIENT
Start: 2022-01-01 | End: 2022-01-01 | Stop reason: HOSPADM

## 2022-01-01 RX ORDER — SODIUM CHLORIDE 0.9 % (FLUSH) 0.9 %
10 SYRINGE (ML) INJECTION
Status: DISCONTINUED | OUTPATIENT
Start: 2022-01-01 | End: 2022-01-01

## 2022-01-01 RX ORDER — DIPHENHYDRAMINE HYDROCHLORIDE 50 MG/ML
25 INJECTION INTRAMUSCULAR; INTRAVENOUS EVERY 6 HOURS PRN
Status: DISCONTINUED | OUTPATIENT
Start: 2022-01-01 | End: 2022-01-01 | Stop reason: HOSPADM

## 2022-01-01 RX ORDER — SODIUM CHLORIDE 9 MG/ML
INJECTION, SOLUTION INTRAVENOUS
Status: DISCONTINUED | OUTPATIENT
Start: 2022-01-01 | End: 2022-01-01

## 2022-01-01 RX ORDER — BISACODYL 5 MG
10 TABLET, DELAYED RELEASE (ENTERIC COATED) ORAL DAILY PRN
Status: DISCONTINUED | OUTPATIENT
Start: 2022-01-01 | End: 2022-06-26 | Stop reason: HOSPADM

## 2022-01-01 RX ORDER — GUAIFENESIN 100 MG/5ML
200 SOLUTION ORAL EVERY 4 HOURS
Status: COMPLETED | OUTPATIENT
Start: 2022-01-01 | End: 2022-01-01

## 2022-01-01 RX ORDER — MEPERIDINE HYDROCHLORIDE 25 MG/ML
25 INJECTION INTRAMUSCULAR; INTRAVENOUS; SUBCUTANEOUS EVERY 10 MIN PRN
Status: CANCELLED | OUTPATIENT
Start: 2022-01-01 | End: 2022-01-01

## 2022-01-01 RX ORDER — SIMETHICONE 80 MG
1 TABLET,CHEWABLE ORAL 3 TIMES DAILY PRN
Status: DISCONTINUED | OUTPATIENT
Start: 2022-01-01 | End: 2022-06-26 | Stop reason: HOSPADM

## 2022-01-01 RX ORDER — METOPROLOL TARTRATE 1 MG/ML
5 INJECTION, SOLUTION INTRAVENOUS EVERY 4 HOURS PRN
Status: DISCONTINUED | OUTPATIENT
Start: 2022-01-01 | End: 2022-01-01

## 2022-01-01 RX ORDER — LINEZOLID 2 MG/ML
600 INJECTION, SOLUTION INTRAVENOUS
Status: DISCONTINUED | OUTPATIENT
Start: 2022-01-01 | End: 2022-01-01

## 2022-01-01 RX ORDER — MORPHINE SULFATE 10 MG/ML
4 INJECTION INTRAMUSCULAR; INTRAVENOUS; SUBCUTANEOUS EVERY 5 MIN PRN
Status: CANCELLED | OUTPATIENT
Start: 2022-01-01

## 2022-01-01 RX ORDER — MORPHINE SULFATE 4 MG/ML
3 INJECTION, SOLUTION INTRAMUSCULAR; INTRAVENOUS
Status: DISCONTINUED | OUTPATIENT
Start: 2022-01-01 | End: 2022-01-01

## 2022-01-01 RX ORDER — SODIUM CHLORIDE 9 MG/ML
INJECTION, SOLUTION INTRAVENOUS ONCE
Status: CANCELLED | OUTPATIENT
Start: 2022-01-01 | End: 2022-01-01

## 2022-01-01 RX ORDER — HYDRALAZINE HYDROCHLORIDE 25 MG/1
25 TABLET, FILM COATED ORAL EVERY 8 HOURS
Status: DISCONTINUED | OUTPATIENT
Start: 2022-01-01 | End: 2022-01-01

## 2022-01-01 RX ORDER — PREDNISONE 5 MG/1
5 TABLET ORAL DAILY
Status: DISCONTINUED | OUTPATIENT
Start: 2022-01-01 | End: 2022-01-01

## 2022-01-01 RX ORDER — HYDROMORPHONE HYDROCHLORIDE 2 MG/ML
0.5 INJECTION, SOLUTION INTRAMUSCULAR; INTRAVENOUS; SUBCUTANEOUS EVERY 5 MIN PRN
Status: CANCELLED | OUTPATIENT
Start: 2022-01-01

## 2022-01-01 RX ORDER — DILTIAZEM HYDROCHLORIDE 60 MG/1
60 TABLET, FILM COATED ORAL EVERY 6 HOURS
Status: DISCONTINUED | OUTPATIENT
Start: 2022-01-01 | End: 2022-01-01

## 2022-01-01 RX ORDER — FENTANYL CITRATE 50 UG/ML
INJECTION, SOLUTION INTRAMUSCULAR; INTRAVENOUS
Status: DISCONTINUED | OUTPATIENT
Start: 2022-01-01 | End: 2022-01-01

## 2022-01-01 RX ORDER — GUAIFENESIN 100 MG/5ML
200 SOLUTION ORAL EVERY 6 HOURS
Status: DISCONTINUED | OUTPATIENT
Start: 2022-01-01 | End: 2022-06-26 | Stop reason: HOSPADM

## 2022-01-01 RX ORDER — GUAIFENESIN 100 MG/5ML
200 SOLUTION ORAL EVERY 6 HOURS
Status: DISCONTINUED | OUTPATIENT
Start: 2022-01-01 | End: 2022-01-01

## 2022-01-01 RX ORDER — MUPIROCIN 20 MG/G
OINTMENT TOPICAL 2 TIMES DAILY
Status: COMPLETED | OUTPATIENT
Start: 2022-01-01 | End: 2022-01-01

## 2022-01-01 RX ORDER — GUAIFENESIN/DEXTROMETHORPHAN 100-10MG/5
10 SYRUP ORAL EVERY 6 HOURS PRN
Status: DISCONTINUED | OUTPATIENT
Start: 2022-01-01 | End: 2022-01-01

## 2022-01-01 RX ORDER — ONDANSETRON 2 MG/ML
4 INJECTION INTRAMUSCULAR; INTRAVENOUS DAILY PRN
Status: DISCONTINUED | OUTPATIENT
Start: 2022-01-01 | End: 2022-01-01 | Stop reason: HOSPADM

## 2022-01-01 RX ORDER — SODIUM CHLORIDE 9 MG/ML
INJECTION, SOLUTION INTRAVENOUS ONCE
Status: COMPLETED | OUTPATIENT
Start: 2022-01-01 | End: 2022-01-01

## 2022-01-01 RX ORDER — SILVER SULFADIAZINE 10 G/1000G
CREAM TOPICAL DAILY PRN
Status: DISCONTINUED | OUTPATIENT
Start: 2022-01-01 | End: 2022-01-01

## 2022-01-01 RX ORDER — HYDROCODONE BITARTRATE AND ACETAMINOPHEN 500; 5 MG/1; MG/1
TABLET ORAL
Status: DISCONTINUED | OUTPATIENT
Start: 2022-01-01 | End: 2022-06-26 | Stop reason: HOSPADM

## 2022-01-01 RX ORDER — DILTIAZEM HYDROCHLORIDE 30 MG/1
30 TABLET, FILM COATED ORAL EVERY 6 HOURS
Status: DISCONTINUED | OUTPATIENT
Start: 2022-01-01 | End: 2022-01-01

## 2022-01-01 RX ORDER — DILTIAZEM HYDROCHLORIDE 90 MG/1
90 TABLET, FILM COATED ORAL EVERY 6 HOURS
Status: DISCONTINUED | OUTPATIENT
Start: 2022-01-01 | End: 2022-01-01

## 2022-01-01 RX ORDER — CEFAZOLIN SODIUM 1 G/3ML
INJECTION, POWDER, FOR SOLUTION INTRAMUSCULAR; INTRAVENOUS
Status: DISCONTINUED | OUTPATIENT
Start: 2022-01-01 | End: 2022-01-01

## 2022-01-01 RX ORDER — MORPHINE SULFATE 4 MG/ML
INJECTION, SOLUTION INTRAMUSCULAR; INTRAVENOUS
Status: COMPLETED
Start: 2022-01-01 | End: 2022-01-01

## 2022-01-01 RX ORDER — METOPROLOL TARTRATE 1 MG/ML
5 INJECTION, SOLUTION INTRAVENOUS EVERY 5 MIN PRN
Status: DISCONTINUED | OUTPATIENT
Start: 2022-01-01 | End: 2022-01-01

## 2022-01-01 RX ORDER — MORPHINE SULFATE 8 MG/ML
4 INJECTION INTRAMUSCULAR; INTRAVENOUS; SUBCUTANEOUS EVERY 5 MIN PRN
Status: DISCONTINUED | OUTPATIENT
Start: 2022-01-01 | End: 2022-01-01 | Stop reason: HOSPADM

## 2022-01-01 RX ORDER — ONDANSETRON 2 MG/ML
4 INJECTION INTRAMUSCULAR; INTRAVENOUS DAILY PRN
Status: CANCELLED | OUTPATIENT
Start: 2022-01-01

## 2022-01-01 RX ORDER — MORPHINE SULFATE 10 MG/ML
4 INJECTION INTRAMUSCULAR; INTRAVENOUS; SUBCUTANEOUS EVERY 5 MIN PRN
Status: DISCONTINUED | OUTPATIENT
Start: 2022-01-01 | End: 2022-01-01 | Stop reason: HOSPADM

## 2022-01-01 RX ORDER — DEXTROSE MONOHYDRATE 100 MG/ML
INJECTION, SOLUTION INTRAVENOUS CONTINUOUS
Status: DISPENSED | OUTPATIENT
Start: 2022-01-01 | End: 2022-01-01

## 2022-01-01 RX ORDER — SILVER SULFADIAZINE 10 G/1000G
CREAM TOPICAL
Status: DISCONTINUED | OUTPATIENT
Start: 2022-01-01 | End: 2022-01-01

## 2022-01-01 RX ORDER — LIDOCAINE HYDROCHLORIDE AND EPINEPHRINE 10; 10 MG/ML; UG/ML
INJECTION, SOLUTION INFILTRATION; PERINEURAL
Status: DISCONTINUED | OUTPATIENT
Start: 2022-01-01 | End: 2022-01-01 | Stop reason: HOSPADM

## 2022-01-01 RX ORDER — CLINDAMYCIN PHOSPHATE 600 MG/50ML
600 INJECTION, SOLUTION INTRAVENOUS
Status: DISCONTINUED | OUTPATIENT
Start: 2022-01-01 | End: 2022-01-01

## 2022-01-01 RX ORDER — ACETYLCYSTEINE 100 MG/ML
4 SOLUTION ORAL; RESPIRATORY (INHALATION) 3 TIMES DAILY
Status: DISCONTINUED | OUTPATIENT
Start: 2022-01-01 | End: 2022-01-01

## 2022-01-01 RX ORDER — SCOLOPAMINE TRANSDERMAL SYSTEM 1 MG/1
1 PATCH, EXTENDED RELEASE TRANSDERMAL
Status: DISCONTINUED | OUTPATIENT
Start: 2022-01-01 | End: 2022-06-26 | Stop reason: HOSPADM

## 2022-01-01 RX ORDER — ACETYLCYSTEINE 200 MG/ML
2 SOLUTION ORAL; RESPIRATORY (INHALATION)
Status: DISCONTINUED | OUTPATIENT
Start: 2022-01-01 | End: 2022-06-26 | Stop reason: HOSPADM

## 2022-01-01 RX ORDER — ONDANSETRON 4 MG/1
4 TABLET, ORALLY DISINTEGRATING ORAL ONCE AS NEEDED
Status: DISCONTINUED | OUTPATIENT
Start: 2022-01-01 | End: 2022-01-01

## 2022-01-01 RX ORDER — ONDANSETRON 2 MG/ML
4 INJECTION INTRAMUSCULAR; INTRAVENOUS ONCE
Status: COMPLETED | OUTPATIENT
Start: 2022-01-01 | End: 2022-01-01

## 2022-01-01 RX ORDER — ACETAMINOPHEN 500 MG
1000 TABLET ORAL ONCE
Status: COMPLETED | OUTPATIENT
Start: 2022-01-01 | End: 2022-01-01

## 2022-01-01 RX ORDER — DIPHENHYDRAMINE HYDROCHLORIDE 50 MG/ML
25 INJECTION INTRAMUSCULAR; INTRAVENOUS EVERY 6 HOURS PRN
Status: CANCELLED | OUTPATIENT
Start: 2022-01-01

## 2022-01-01 RX ORDER — EPINEPHRINE 0.1 MG/ML
INJECTION INTRAVENOUS CODE/TRAUMA/SEDATION MEDICATION
Status: COMPLETED | OUTPATIENT
Start: 2022-01-01 | End: 2022-01-01

## 2022-01-01 RX ORDER — SIMETHICONE 80 MG
1 TABLET,CHEWABLE ORAL 3 TIMES DAILY PRN
Status: DISCONTINUED | OUTPATIENT
Start: 2022-01-01 | End: 2022-01-01

## 2022-01-01 RX ORDER — SODIUM CHLORIDE 9 MG/ML
INJECTION, SOLUTION INTRAVENOUS
Status: DISCONTINUED | OUTPATIENT
Start: 2022-01-01 | End: 2022-06-26 | Stop reason: HOSPADM

## 2022-01-01 RX ORDER — LINEZOLID 600 MG/1
600 TABLET, FILM COATED ORAL EVERY 12 HOURS
Status: DISCONTINUED | OUTPATIENT
Start: 2022-01-01 | End: 2022-06-26 | Stop reason: HOSPADM

## 2022-01-01 RX ORDER — SEVELAMER CARBONATE FOR ORAL SUSPENSION 800 MG/1
0.8 POWDER, FOR SUSPENSION ORAL
Status: DISCONTINUED | OUTPATIENT
Start: 2022-01-01 | End: 2022-06-26 | Stop reason: HOSPADM

## 2022-01-01 RX ORDER — ONDANSETRON 2 MG/ML
INJECTION INTRAMUSCULAR; INTRAVENOUS
Status: COMPLETED
Start: 2022-01-01 | End: 2022-01-01

## 2022-01-01 RX ORDER — TRAZODONE HYDROCHLORIDE 50 MG/1
50 TABLET ORAL NIGHTLY PRN
Status: DISCONTINUED | OUTPATIENT
Start: 2022-01-01 | End: 2022-01-01

## 2022-01-01 RX ORDER — IPRATROPIUM BROMIDE AND ALBUTEROL SULFATE 2.5; .5 MG/3ML; MG/3ML
3 SOLUTION RESPIRATORY (INHALATION) EVERY 6 HOURS
Status: DISCONTINUED | OUTPATIENT
Start: 2022-01-01 | End: 2022-06-26 | Stop reason: HOSPADM

## 2022-01-01 RX ORDER — SODIUM CHLORIDE 9 MG/ML
INJECTION, SOLUTION INTRAVENOUS
Status: DISPENSED
Start: 2022-01-01 | End: 2022-01-01

## 2022-01-01 RX ORDER — EPINEPHRINE 0.3 MG/.3ML
0.3 INJECTION SUBCUTANEOUS
Status: DISCONTINUED | OUTPATIENT
Start: 2022-01-01 | End: 2022-01-01

## 2022-01-01 RX ORDER — MORPHINE SULFATE 4 MG/ML
4 INJECTION, SOLUTION INTRAMUSCULAR; INTRAVENOUS ONCE
Status: COMPLETED | OUTPATIENT
Start: 2022-01-01 | End: 2022-01-01

## 2022-01-01 RX ORDER — ACETAMINOPHEN 325 MG/1
650 TABLET ORAL ONCE AS NEEDED
Status: DISCONTINUED | OUTPATIENT
Start: 2022-01-01 | End: 2022-01-01

## 2022-01-01 RX ORDER — SEVELAMER CARBONATE FOR ORAL SUSPENSION 800 MG/1
0.8 POWDER, FOR SUSPENSION ORAL
Status: DISCONTINUED | OUTPATIENT
Start: 2022-01-01 | End: 2022-01-01

## 2022-01-01 RX ORDER — SEVELAMER CARBONATE FOR ORAL SUSPENSION 800 MG/1
0.8 POWDER, FOR SUSPENSION ORAL
Status: COMPLETED | OUTPATIENT
Start: 2022-01-01 | End: 2022-01-01

## 2022-01-01 RX ORDER — ALBUTEROL SULFATE 0.83 MG/ML
2.5 SOLUTION RESPIRATORY (INHALATION) EVERY 4 HOURS PRN
Status: DISCONTINUED | OUTPATIENT
Start: 2022-01-01 | End: 2022-06-26 | Stop reason: HOSPADM

## 2022-01-01 RX ORDER — ACETAMINOPHEN 500 MG
1000 TABLET ORAL EVERY 6 HOURS PRN
Status: DISCONTINUED | OUTPATIENT
Start: 2022-01-01 | End: 2022-01-01

## 2022-01-01 RX ORDER — IPRATROPIUM BROMIDE AND ALBUTEROL SULFATE 2.5; .5 MG/3ML; MG/3ML
3 SOLUTION RESPIRATORY (INHALATION)
Status: DISCONTINUED | OUTPATIENT
Start: 2022-01-01 | End: 2022-01-01 | Stop reason: HOSPADM

## 2022-01-01 RX ORDER — SILVER SULFADIAZINE 10 G/1000G
CREAM TOPICAL DAILY
Status: DISCONTINUED | OUTPATIENT
Start: 2022-01-01 | End: 2022-01-01

## 2022-01-01 RX ORDER — ONDANSETRON 2 MG/ML
8 INJECTION INTRAMUSCULAR; INTRAVENOUS EVERY 6 HOURS PRN
Status: DISCONTINUED | OUTPATIENT
Start: 2022-01-01 | End: 2022-01-01

## 2022-01-01 RX ORDER — FENTANYL 50 UG/1
1 PATCH TRANSDERMAL
Status: DISCONTINUED | OUTPATIENT
Start: 2022-01-01 | End: 2022-01-01

## 2022-01-01 RX ORDER — AMLODIPINE BESYLATE 10 MG/1
10 TABLET ORAL DAILY
Status: DISCONTINUED | OUTPATIENT
Start: 2022-01-01 | End: 2022-01-01

## 2022-01-01 RX ORDER — ACETIC ACID 0.25 G/100ML
IRRIGANT IRRIGATION DAILY PRN
Status: DISCONTINUED | OUTPATIENT
Start: 2022-01-01 | End: 2022-06-26 | Stop reason: HOSPADM

## 2022-01-01 RX ORDER — ACETAMINOPHEN 500 MG
500 TABLET ORAL EVERY 6 HOURS PRN
Status: DISCONTINUED | OUTPATIENT
Start: 2022-01-01 | End: 2022-06-26 | Stop reason: HOSPADM

## 2022-01-01 RX ORDER — PANTOPRAZOLE SODIUM 40 MG/1
40 FOR SUSPENSION ORAL DAILY
Status: DISCONTINUED | OUTPATIENT
Start: 2022-01-01 | End: 2022-06-26 | Stop reason: HOSPADM

## 2022-01-01 RX ORDER — METOPROLOL TARTRATE 25 MG/1
12.5 TABLET ORAL 2 TIMES DAILY
Status: DISCONTINUED | OUTPATIENT
Start: 2022-01-01 | End: 2022-01-01

## 2022-01-01 RX ORDER — ONDANSETRON 2 MG/ML
INJECTION INTRAMUSCULAR; INTRAVENOUS
Status: DISCONTINUED | OUTPATIENT
Start: 2022-01-01 | End: 2022-01-01

## 2022-01-01 RX ORDER — MORPHINE SULFATE 4 MG/ML
4 INJECTION, SOLUTION INTRAMUSCULAR; INTRAVENOUS
Status: DISCONTINUED | OUTPATIENT
Start: 2022-01-01 | End: 2022-01-01

## 2022-01-01 RX ORDER — AMLODIPINE BESYLATE 5 MG/1
5 TABLET ORAL DAILY
Status: DISCONTINUED | OUTPATIENT
Start: 2022-01-01 | End: 2022-01-01

## 2022-01-01 RX ORDER — INSULIN ASPART 100 [IU]/ML
1-10 INJECTION, SOLUTION INTRAVENOUS; SUBCUTANEOUS EVERY 6 HOURS
Status: DISCONTINUED | OUTPATIENT
Start: 2022-01-01 | End: 2022-06-26 | Stop reason: HOSPADM

## 2022-01-01 RX ORDER — ACETIC ACID 0.25 G/100ML
IRRIGANT IRRIGATION
Status: DISCONTINUED | OUTPATIENT
Start: 2022-01-01 | End: 2022-01-01

## 2022-01-01 RX ORDER — PREDNISONE 2.5 MG/1
2.5 TABLET ORAL DAILY
Status: DISCONTINUED | OUTPATIENT
Start: 2022-01-01 | End: 2022-01-01

## 2022-01-01 RX ORDER — FENTANYL 25 UG/1
1 PATCH TRANSDERMAL
Status: DISCONTINUED | OUTPATIENT
Start: 2022-01-01 | End: 2022-01-01

## 2022-01-01 RX ORDER — HYDROCODONE BITARTRATE AND ACETAMINOPHEN 5; 325 MG/1; MG/1
1 TABLET ORAL EVERY 8 HOURS
Status: DISPENSED | OUTPATIENT
Start: 2022-01-01 | End: 2022-01-01

## 2022-01-01 RX ORDER — LORAZEPAM 2 MG/ML
2 INJECTION INTRAMUSCULAR EVERY 6 HOURS PRN
Status: DISCONTINUED | OUTPATIENT
Start: 2022-01-01 | End: 2022-01-01

## 2022-01-01 RX ORDER — SODIUM CHLORIDE, SODIUM LACTATE, POTASSIUM CHLORIDE, CALCIUM CHLORIDE 600; 310; 30; 20 MG/100ML; MG/100ML; MG/100ML; MG/100ML
125 INJECTION, SOLUTION INTRAVENOUS CONTINUOUS
Status: DISCONTINUED | OUTPATIENT
Start: 2022-01-01 | End: 2022-01-01 | Stop reason: HOSPADM

## 2022-01-01 RX ORDER — HEPARIN SODIUM 5000 [USP'U]/ML
5000 INJECTION, SOLUTION INTRAVENOUS; SUBCUTANEOUS EVERY 8 HOURS
Status: DISCONTINUED | OUTPATIENT
Start: 2022-01-01 | End: 2022-06-26 | Stop reason: HOSPADM

## 2022-01-01 RX ORDER — MUPIROCIN 20 MG/G
OINTMENT TOPICAL 2 TIMES DAILY
Status: DISPENSED | OUTPATIENT
Start: 2022-01-01 | End: 2022-01-01

## 2022-01-01 RX ORDER — HEPARIN SODIUM 5000 [USP'U]/ML
5000 INJECTION, SOLUTION INTRAVENOUS; SUBCUTANEOUS EVERY 8 HOURS
Status: DISCONTINUED | OUTPATIENT
Start: 2022-01-01 | End: 2022-01-01

## 2022-01-01 RX ORDER — HYDROCODONE BITARTRATE AND ACETAMINOPHEN 5; 325 MG/1; MG/1
1 TABLET ORAL EVERY 12 HOURS
Status: DISPENSED | OUTPATIENT
Start: 2022-01-01 | End: 2022-01-01

## 2022-01-01 RX ORDER — SODIUM CHLORIDE 9 MG/ML
INJECTION, SOLUTION INTRAVENOUS
Status: CANCELLED | OUTPATIENT
Start: 2022-01-01

## 2022-01-01 RX ORDER — POTASSIUM IODIDE 1 G/ML
5 SOLUTION ORAL 4 TIMES DAILY
Status: DISCONTINUED | OUTPATIENT
Start: 2022-01-01 | End: 2022-01-01

## 2022-01-01 RX ORDER — ALBUTEROL SULFATE 90 UG/1
2 AEROSOL, METERED RESPIRATORY (INHALATION)
Status: DISCONTINUED | OUTPATIENT
Start: 2022-01-01 | End: 2022-01-01

## 2022-01-01 RX ORDER — DIPHENHYDRAMINE HYDROCHLORIDE 50 MG/ML
25 INJECTION INTRAMUSCULAR; INTRAVENOUS ONCE AS NEEDED
Status: DISCONTINUED | OUTPATIENT
Start: 2022-01-01 | End: 2022-01-01

## 2022-01-01 RX ORDER — ACETYLCYSTEINE 200 MG/ML
2 SOLUTION ORAL; RESPIRATORY (INHALATION) 3 TIMES DAILY
Status: DISCONTINUED | OUTPATIENT
Start: 2022-01-01 | End: 2022-01-01

## 2022-01-01 RX ORDER — DEXTROSE MONOHYDRATE 100 MG/ML
INJECTION, SOLUTION INTRAVENOUS CONTINUOUS
Status: DISCONTINUED | OUTPATIENT
Start: 2022-01-01 | End: 2022-01-01

## 2022-01-01 RX ADMIN — SEVELAMER CARBONATE 0.8 G: 800 POWDER, FOR SUSPENSION ORAL at 01:05

## 2022-01-01 RX ADMIN — GUAIFENESIN 200 MG: 200 SOLUTION ORAL at 11:04

## 2022-01-01 RX ADMIN — GUAIFENESIN 200 MG: 200 SOLUTION ORAL at 05:04

## 2022-01-01 RX ADMIN — SEVELAMER CARBONATE 0.8 G: 800 POWDER, FOR SUSPENSION ORAL at 11:04

## 2022-01-01 RX ADMIN — SODIUM CHLORIDE 1000 ML: 9 INJECTION, SOLUTION INTRAVENOUS at 10:04

## 2022-01-01 RX ADMIN — TICAGRELOR 90 MG: 90 TABLET ORAL at 09:01

## 2022-01-01 RX ADMIN — SEVELAMER CARBONATE 0.8 G: 800 POWDER, FOR SUSPENSION ORAL at 11:01

## 2022-01-01 RX ADMIN — TICAGRELOR 90 MG: 90 TABLET ORAL at 08:01

## 2022-01-01 RX ADMIN — SEVELAMER CARBONATE 0.8 G: 800 POWDER, FOR SUSPENSION ORAL at 07:05

## 2022-01-01 RX ADMIN — IPRATROPIUM BROMIDE AND ALBUTEROL SULFATE 3 ML: 2.5; .5 SOLUTION RESPIRATORY (INHALATION) at 07:05

## 2022-01-01 RX ADMIN — GUAIFENESIN 200 MG: 200 SOLUTION ORAL at 12:05

## 2022-01-01 RX ADMIN — MEROPENEM 500 MG: 500 INJECTION, POWDER, FOR SOLUTION INTRAVENOUS at 12:01

## 2022-01-01 RX ADMIN — HEPARIN SODIUM 4000 UNITS: 1000 INJECTION INTRAVENOUS; SUBCUTANEOUS at 11:05

## 2022-01-01 RX ADMIN — TOBRAMYCIN SULFATE 220 MG: 40 INJECTION, SOLUTION INTRAMUSCULAR; INTRAVENOUS at 09:06

## 2022-01-01 RX ADMIN — POTASSIUM IODIDE 5 DROP: 1 SOLUTION ORAL at 12:06

## 2022-01-01 RX ADMIN — IPRATROPIUM BROMIDE AND ALBUTEROL SULFATE 3 ML: 2.5; .5 SOLUTION RESPIRATORY (INHALATION) at 12:06

## 2022-01-01 RX ADMIN — GUAIFENESIN 200 MG: 200 SOLUTION ORAL at 12:06

## 2022-01-01 RX ADMIN — NOREPINEPHRINE BITARTRATE 1.2 MCG/KG/MIN: 1 SOLUTION INTRAVENOUS at 09:06

## 2022-01-01 RX ADMIN — GUAIFENESIN 200 MG: 200 SOLUTION ORAL at 05:06

## 2022-01-01 RX ADMIN — GUAIFENESIN 200 MG: 200 SOLUTION ORAL at 06:04

## 2022-01-01 RX ADMIN — IPRATROPIUM BROMIDE AND ALBUTEROL SULFATE 3 ML: 2.5; .5 SOLUTION RESPIRATORY (INHALATION) at 12:05

## 2022-01-01 RX ADMIN — SEVELAMER CARBONATE 0.8 G: 800 POWDER, FOR SUSPENSION ORAL at 08:03

## 2022-01-01 RX ADMIN — INSULIN DETEMIR 15 UNITS: 100 INJECTION, SOLUTION SUBCUTANEOUS at 10:06

## 2022-01-01 RX ADMIN — METOPROLOL TARTRATE 12.5 MG: 25 TABLET, FILM COATED ORAL at 09:05

## 2022-01-01 RX ADMIN — HEPARIN SODIUM 5000 UNITS: 5000 INJECTION INTRAVENOUS; SUBCUTANEOUS at 05:04

## 2022-01-01 RX ADMIN — GUAIFENESIN 200 MG: 200 SOLUTION ORAL at 12:04

## 2022-01-01 RX ADMIN — AMPHOTERICIN B 450 MG: 50 INJECTION, POWDER, LYOPHILIZED, FOR SOLUTION INTRAVENOUS at 05:01

## 2022-01-01 RX ADMIN — DILTIAZEM HYDROCHLORIDE 90 MG: 90 TABLET, FILM COATED ORAL at 06:06

## 2022-01-01 RX ADMIN — PHENYLEPHRINE HYDROCHLORIDE 200 MCG: 10 INJECTION INTRAVENOUS at 09:05

## 2022-01-01 RX ADMIN — DILTIAZEM HYDROCHLORIDE 90 MG: 90 TABLET, FILM COATED ORAL at 12:03

## 2022-01-01 RX ADMIN — INSULIN ASPART 2 UNITS: 100 INJECTION, SOLUTION INTRAVENOUS; SUBCUTANEOUS at 05:06

## 2022-01-01 RX ADMIN — IPRATROPIUM BROMIDE AND ALBUTEROL SULFATE 3 ML: 2.5; .5 SOLUTION RESPIRATORY (INHALATION) at 06:05

## 2022-01-01 RX ADMIN — PROPOFOL 40.16 MCG/KG/MIN: 10 INJECTION, EMULSION INTRAVENOUS at 12:01

## 2022-01-01 RX ADMIN — PANTOPRAZOLE SODIUM 40 MG: 40 GRANULE, DELAYED RELEASE ORAL at 09:02

## 2022-01-01 RX ADMIN — PANTOPRAZOLE SODIUM 40 MG: 40 GRANULE, DELAYED RELEASE ORAL at 08:06

## 2022-01-01 RX ADMIN — POTASSIUM IODIDE 5 DROP: 1 SOLUTION ORAL at 09:06

## 2022-01-01 RX ADMIN — DILTIAZEM HYDROCHLORIDE 90 MG: 90 TABLET, FILM COATED ORAL at 06:03

## 2022-01-01 RX ADMIN — INSULIN DETEMIR 25 UNITS: 100 INJECTION, SOLUTION SUBCUTANEOUS at 09:04

## 2022-01-01 RX ADMIN — GUAIFENESIN 200 MG: 200 SOLUTION ORAL at 01:04

## 2022-01-01 RX ADMIN — AMPICILLIN SODIUM AND SULBACTAM SODIUM 1.5 G: 1; .5 INJECTION, POWDER, FOR SOLUTION INTRAMUSCULAR; INTRAVENOUS at 10:01

## 2022-01-01 RX ADMIN — HYDRALAZINE HYDROCHLORIDE 25 MG: 25 TABLET ORAL at 05:04

## 2022-01-01 RX ADMIN — HEPARIN SODIUM 5000 UNITS: 5000 INJECTION INTRAVENOUS; SUBCUTANEOUS at 01:04

## 2022-01-01 RX ADMIN — HEPARIN SODIUM 4000 UNITS: 1000 INJECTION INTRAVENOUS; SUBCUTANEOUS at 11:01

## 2022-01-01 RX ADMIN — INSULIN ASPART 1 UNITS: 100 INJECTION, SOLUTION INTRAVENOUS; SUBCUTANEOUS at 12:03

## 2022-01-01 RX ADMIN — METOPROLOL TARTRATE 12.5 MG: 25 TABLET, FILM COATED ORAL at 08:05

## 2022-01-01 RX ADMIN — IPRATROPIUM BROMIDE AND ALBUTEROL SULFATE 3 ML: 2.5; .5 SOLUTION RESPIRATORY (INHALATION) at 07:04

## 2022-01-01 RX ADMIN — GUAIFENESIN 200 MG: 200 SOLUTION ORAL at 01:05

## 2022-01-01 RX ADMIN — DILTIAZEM HYDROCHLORIDE 90 MG: 90 TABLET, FILM COATED ORAL at 05:06

## 2022-01-01 RX ADMIN — DILTIAZEM HYDROCHLORIDE 60 MG: 60 TABLET, FILM COATED ORAL at 01:03

## 2022-01-01 RX ADMIN — EPOETIN ALFA-EPBX 10000 UNITS: 10000 INJECTION, SOLUTION INTRAVENOUS; SUBCUTANEOUS at 02:05

## 2022-01-01 RX ADMIN — GUAIFENESIN 200 MG: 200 SOLUTION ORAL at 06:05

## 2022-01-01 RX ADMIN — LINEZOLID 600 MG: 600 TABLET, FILM COATED ORAL at 08:06

## 2022-01-01 RX ADMIN — SEVELAMER CARBONATE 0.8 G: 800 POWDER, FOR SUSPENSION ORAL at 12:05

## 2022-01-01 RX ADMIN — HEPARIN SODIUM 5000 UNITS: 5000 INJECTION INTRAVENOUS; SUBCUTANEOUS at 09:06

## 2022-01-01 RX ADMIN — PROPOFOL 35 MCG/KG/MIN: 10 INJECTION, EMULSION INTRAVENOUS at 03:01

## 2022-01-01 RX ADMIN — IPRATROPIUM BROMIDE AND ALBUTEROL SULFATE 3 ML: 2.5; .5 SOLUTION RESPIRATORY (INHALATION) at 12:04

## 2022-01-01 RX ADMIN — SEVELAMER CARBONATE 0.8 G: 800 POWDER, FOR SUSPENSION ORAL at 05:04

## 2022-01-01 RX ADMIN — DILTIAZEM HYDROCHLORIDE 60 MG: 60 TABLET, FILM COATED ORAL at 06:02

## 2022-01-01 RX ADMIN — PREDNISONE 2.5 MG: 2.5 TABLET ORAL at 08:05

## 2022-01-01 RX ADMIN — INSULIN ASPART 2 UNITS: 100 INJECTION, SOLUTION INTRAVENOUS; SUBCUTANEOUS at 12:02

## 2022-01-01 RX ADMIN — GUAIFENESIN 200 MG: 200 SOLUTION ORAL at 12:03

## 2022-01-01 RX ADMIN — SODIUM CHLORIDE 250 ML: 9 INJECTION, SOLUTION INTRAVENOUS at 09:06

## 2022-01-01 RX ADMIN — POTASSIUM IODIDE 5 DROP: 1 SOLUTION ORAL at 05:06

## 2022-01-01 RX ADMIN — DILTIAZEM HYDROCHLORIDE 90 MG: 90 TABLET, FILM COATED ORAL at 11:06

## 2022-01-01 RX ADMIN — DILTIAZEM HYDROCHLORIDE 60 MG: 60 TABLET, FILM COATED ORAL at 11:03

## 2022-01-01 RX ADMIN — DILTIAZEM HYDROCHLORIDE 30 MG: 30 TABLET, FILM COATED ORAL at 05:01

## 2022-01-01 RX ADMIN — DILTIAZEM HYDROCHLORIDE 30 MG: 30 TABLET, FILM COATED ORAL at 01:01

## 2022-01-01 RX ADMIN — SEVELAMER CARBONATE 0.8 G: 800 POWDER, FOR SUSPENSION ORAL at 12:06

## 2022-01-01 RX ADMIN — GUAIFENESIN 200 MG: 200 SOLUTION ORAL at 05:05

## 2022-01-01 RX ADMIN — DILTIAZEM HYDROCHLORIDE 60 MG: 60 TABLET, FILM COATED ORAL at 12:02

## 2022-01-01 RX ADMIN — DILTIAZEM HYDROCHLORIDE 60 MG: 60 TABLET, FILM COATED ORAL at 12:03

## 2022-01-01 RX ADMIN — IPRATROPIUM BROMIDE AND ALBUTEROL SULFATE 3 ML: 2.5; .5 SOLUTION RESPIRATORY (INHALATION) at 02:03

## 2022-01-01 RX ADMIN — PIPERACILLIN AND TAZOBACTAM 4.5 G: 4; .5 INJECTION, POWDER, FOR SOLUTION INTRAVENOUS at 02:01

## 2022-01-01 RX ADMIN — TICAGRELOR 90 MG: 90 TABLET ORAL at 09:02

## 2022-01-01 RX ADMIN — INSULIN ASPART 1 UNITS: 100 INJECTION, SOLUTION INTRAVENOUS; SUBCUTANEOUS at 01:02

## 2022-01-01 RX ADMIN — METOPROLOL TARTRATE 12.5 MG: 25 TABLET, FILM COATED ORAL at 08:06

## 2022-01-01 RX ADMIN — INSULIN ASPART 2 UNITS: 100 INJECTION, SOLUTION INTRAVENOUS; SUBCUTANEOUS at 05:01

## 2022-01-01 RX ADMIN — PANTOPRAZOLE SODIUM 40 MG: 40 GRANULE, DELAYED RELEASE ORAL at 08:05

## 2022-01-01 RX ADMIN — SODIUM CHLORIDE: 9 INJECTION, SOLUTION INTRAVENOUS at 10:06

## 2022-01-01 RX ADMIN — HYDRALAZINE HYDROCHLORIDE 25 MG: 25 TABLET ORAL at 09:04

## 2022-01-01 RX ADMIN — SEVELAMER CARBONATE 0.8 G: 800 POWDER, FOR SUSPENSION ORAL at 05:06

## 2022-01-01 RX ADMIN — IPRATROPIUM BROMIDE AND ALBUTEROL SULFATE 3 ML: 2.5; .5 SOLUTION RESPIRATORY (INHALATION) at 08:03

## 2022-01-01 RX ADMIN — DILTIAZEM HYDROCHLORIDE 90 MG: 90 TABLET, FILM COATED ORAL at 06:05

## 2022-01-01 RX ADMIN — DILTIAZEM HYDROCHLORIDE 90 MG: 90 TABLET, FILM COATED ORAL at 06:04

## 2022-01-01 RX ADMIN — INSULIN ASPART 6 UNITS: 100 INJECTION, SOLUTION INTRAVENOUS; SUBCUTANEOUS at 12:03

## 2022-01-01 RX ADMIN — IPRATROPIUM BROMIDE AND ALBUTEROL SULFATE 3 ML: 2.5; .5 SOLUTION RESPIRATORY (INHALATION) at 07:06

## 2022-01-01 RX ADMIN — ACETYLCYSTEINE 2 ML: 200 SOLUTION ORAL; RESPIRATORY (INHALATION) at 07:06

## 2022-01-01 RX ADMIN — LINEZOLID 600 MG: 600 TABLET, FILM COATED ORAL at 09:06

## 2022-01-01 RX ADMIN — SEVELAMER CARBONATE 0.8 G: 800 POWDER, FOR SUSPENSION ORAL at 05:05

## 2022-01-01 RX ADMIN — HEPARIN SODIUM 5000 UNITS: 5000 INJECTION INTRAVENOUS; SUBCUTANEOUS at 01:05

## 2022-01-01 RX ADMIN — GUAIFENESIN 200 MG: 200 SOLUTION ORAL at 05:02

## 2022-01-01 RX ADMIN — GUAIFENESIN 200 MG: 200 SOLUTION ORAL at 11:03

## 2022-01-01 RX ADMIN — CLINDAMYCIN IN 5 PERCENT DEXTROSE 600 MG: 12 INJECTION, SOLUTION INTRAVENOUS at 09:04

## 2022-01-01 RX ADMIN — METOPROLOL TARTRATE 12.5 MG: 25 TABLET, FILM COATED ORAL at 08:04

## 2022-01-01 RX ADMIN — INSULIN ASPART 2 UNITS: 100 INJECTION, SOLUTION INTRAVENOUS; SUBCUTANEOUS at 07:03

## 2022-01-01 RX ADMIN — SEVELAMER CARBONATE 0.8 G: 800 POWDER, FOR SUSPENSION ORAL at 05:02

## 2022-01-01 RX ADMIN — DILTIAZEM HYDROCHLORIDE 90 MG: 90 TABLET, FILM COATED ORAL at 05:05

## 2022-01-01 RX ADMIN — PANTOPRAZOLE SODIUM 40 MG: 40 GRANULE, DELAYED RELEASE ORAL at 08:04

## 2022-01-01 RX ADMIN — SEVELAMER CARBONATE 0.8 G: 800 POWDER, FOR SUSPENSION ORAL at 09:03

## 2022-01-01 RX ADMIN — HYDROCODONE BITARTRATE AND ACETAMINOPHEN 1 TABLET: 5; 325 TABLET ORAL at 02:04

## 2022-01-01 RX ADMIN — SILVER SULFADIAZINE: 10 CREAM TOPICAL at 01:02

## 2022-01-01 RX ADMIN — SODIUM CHLORIDE: 9 INJECTION, SOLUTION INTRAVENOUS at 09:03

## 2022-01-01 RX ADMIN — HEPARIN SODIUM 5000 UNITS: 5000 INJECTION INTRAVENOUS; SUBCUTANEOUS at 09:01

## 2022-01-01 RX ADMIN — EPOETIN ALFA-EPBX 10000 UNITS: 10000 INJECTION, SOLUTION INTRAVENOUS; SUBCUTANEOUS at 09:06

## 2022-01-01 RX ADMIN — GENTAMICIN SULFATE 260 MG: 40 INJECTION, SOLUTION INTRAMUSCULAR; INTRAVENOUS at 05:01

## 2022-01-01 RX ADMIN — PIPERACILLIN AND TAZOBACTAM 4.5 G: 4; .5 INJECTION, POWDER, FOR SOLUTION INTRAVENOUS at 01:01

## 2022-01-01 RX ADMIN — GUAIFENESIN 200 MG: 200 SOLUTION ORAL at 01:03

## 2022-01-01 RX ADMIN — DEXTROSE MONOHYDRATE 12.5 G: 25 INJECTION, SOLUTION INTRAVENOUS at 06:05

## 2022-01-01 RX ADMIN — INSULIN ASPART 2 UNITS: 100 INJECTION, SOLUTION INTRAVENOUS; SUBCUTANEOUS at 05:03

## 2022-01-01 RX ADMIN — SEVELAMER CARBONATE 0.8 G: 800 POWDER, FOR SUSPENSION ORAL at 09:05

## 2022-01-01 RX ADMIN — PHENYLEPHRINE HYDROCHLORIDE 100 MCG: 10 INJECTION INTRAVENOUS at 02:03

## 2022-01-01 RX ADMIN — SEVELAMER CARBONATE 0.8 G: 800 POWDER, FOR SUSPENSION ORAL at 12:03

## 2022-01-01 RX ADMIN — SODIUM CHLORIDE: 9 INJECTION, SOLUTION INTRAVENOUS at 03:04

## 2022-01-01 RX ADMIN — SEVELAMER CARBONATE 0.8 G: 800 POWDER, FOR SUSPENSION ORAL at 08:05

## 2022-01-01 RX ADMIN — HEPARIN SODIUM 5000 UNITS: 5000 INJECTION INTRAVENOUS; SUBCUTANEOUS at 06:04

## 2022-01-01 RX ADMIN — GUAIFENESIN 200 MG: 200 SOLUTION ORAL at 11:05

## 2022-01-01 RX ADMIN — SEVELAMER CARBONATE 0.8 G: 800 POWDER, FOR SUSPENSION ORAL at 04:03

## 2022-01-01 RX ADMIN — GUAIFENESIN 200 MG: 200 SOLUTION ORAL at 02:02

## 2022-01-01 RX ADMIN — DILTIAZEM HYDROCHLORIDE 30 MG: 30 TABLET, FILM COATED ORAL at 12:01

## 2022-01-01 RX ADMIN — HEPARIN SODIUM 5000 UNITS: 5000 INJECTION INTRAVENOUS; SUBCUTANEOUS at 09:04

## 2022-01-01 RX ADMIN — ONDANSETRON 8 MG: 2 INJECTION INTRAMUSCULAR; INTRAVENOUS at 04:03

## 2022-01-01 RX ADMIN — DILTIAZEM HYDROCHLORIDE 90 MG: 90 TABLET, FILM COATED ORAL at 05:03

## 2022-01-01 RX ADMIN — TICAGRELOR 90 MG: 90 TABLET ORAL at 09:03

## 2022-01-01 RX ADMIN — MUPIROCIN: 20 OINTMENT TOPICAL at 10:02

## 2022-01-01 RX ADMIN — FENTANYL 1 PATCH: 50 PATCH, EXTENDED RELEASE TRANSDERMAL at 11:01

## 2022-01-01 RX ADMIN — HEPARIN SODIUM 5000 UNITS: 5000 INJECTION INTRAVENOUS; SUBCUTANEOUS at 01:06

## 2022-01-01 RX ADMIN — NOREPINEPHRINE BITARTRATE 0.02 MCG/KG/MIN: 1 SOLUTION INTRAVENOUS at 04:06

## 2022-01-01 RX ADMIN — IPRATROPIUM BROMIDE AND ALBUTEROL SULFATE 3 ML: 2.5; .5 SOLUTION RESPIRATORY (INHALATION) at 08:04

## 2022-01-01 RX ADMIN — INSULIN ASPART 2 UNITS: 100 INJECTION, SOLUTION INTRAVENOUS; SUBCUTANEOUS at 06:03

## 2022-01-01 RX ADMIN — SODIUM CHLORIDE: 9 INJECTION, SOLUTION INTRAVENOUS at 02:05

## 2022-01-01 RX ADMIN — PIPERACILLIN AND TAZOBACTAM 4.5 G: 4; .5 INJECTION, POWDER, LYOPHILIZED, FOR SOLUTION INTRAVENOUS at 05:03

## 2022-01-01 RX ADMIN — HEPARIN SODIUM 4000 UNITS: 1000 INJECTION INTRAVENOUS; SUBCUTANEOUS at 01:06

## 2022-01-01 RX ADMIN — DILTIAZEM HYDROCHLORIDE 90 MG: 90 TABLET, FILM COATED ORAL at 11:04

## 2022-01-01 RX ADMIN — TOBRAMYCIN SULFATE 220 MG: 40 INJECTION, SOLUTION INTRAMUSCULAR; INTRAVENOUS at 05:06

## 2022-01-01 RX ADMIN — SODIUM CHLORIDE: 9 INJECTION, SOLUTION INTRAVENOUS at 09:06

## 2022-01-01 RX ADMIN — SEVELAMER CARBONATE 0.8 G: 800 POWDER, FOR SUSPENSION ORAL at 05:03

## 2022-01-01 RX ADMIN — SEVELAMER CARBONATE 0.8 G: 800 POWDER, FOR SUSPENSION ORAL at 08:04

## 2022-01-01 RX ADMIN — DEXTROSE MONOHYDRATE 12.5 G: 25 INJECTION, SOLUTION INTRAVENOUS at 08:05

## 2022-01-01 RX ADMIN — INSULIN ASPART 8 UNITS: 100 INJECTION, SOLUTION INTRAVENOUS; SUBCUTANEOUS at 06:01

## 2022-01-01 RX ADMIN — FENTANYL 1 PATCH: 25 PATCH, EXTENDED RELEASE TRANSDERMAL at 06:04

## 2022-01-01 RX ADMIN — DILTIAZEM HYDROCHLORIDE 30 MG: 30 TABLET, FILM COATED ORAL at 06:01

## 2022-01-01 RX ADMIN — IPRATROPIUM BROMIDE AND ALBUTEROL SULFATE 3 ML: 2.5; .5 SOLUTION RESPIRATORY (INHALATION) at 11:04

## 2022-01-01 RX ADMIN — DILTIAZEM HYDROCHLORIDE 90 MG: 90 TABLET, FILM COATED ORAL at 12:05

## 2022-01-01 RX ADMIN — SODIUM CHLORIDE: 9 INJECTION, SOLUTION INTRAVENOUS at 11:03

## 2022-01-01 RX ADMIN — EPOETIN ALFA-EPBX 10000 UNITS: 10000 INJECTION, SOLUTION INTRAVENOUS; SUBCUTANEOUS at 10:05

## 2022-01-01 RX ADMIN — PROPOFOL 41.9 MCG/KG/MIN: 10 INJECTION, EMULSION INTRAVENOUS at 06:01

## 2022-01-01 RX ADMIN — DILTIAZEM HYDROCHLORIDE 90 MG: 90 TABLET, FILM COATED ORAL at 11:05

## 2022-01-01 RX ADMIN — FENTANYL 1 PATCH: 50 PATCH, EXTENDED RELEASE TRANSDERMAL at 03:01

## 2022-01-01 RX ADMIN — DILTIAZEM HYDROCHLORIDE 60 MG: 60 TABLET, FILM COATED ORAL at 05:02

## 2022-01-01 RX ADMIN — DILTIAZEM HYDROCHLORIDE 30 MG: 30 TABLET, FILM COATED ORAL at 07:01

## 2022-01-01 RX ADMIN — INSULIN ASPART 2 UNITS: 100 INJECTION, SOLUTION INTRAVENOUS; SUBCUTANEOUS at 01:02

## 2022-01-01 RX ADMIN — IPRATROPIUM BROMIDE AND ALBUTEROL SULFATE 3 ML: 2.5; .5 SOLUTION RESPIRATORY (INHALATION) at 08:06

## 2022-01-01 RX ADMIN — DILTIAZEM HYDROCHLORIDE 90 MG: 90 TABLET, FILM COATED ORAL at 05:04

## 2022-01-01 RX ADMIN — PANTOPRAZOLE SODIUM 40 MG: 40 GRANULE, DELAYED RELEASE ORAL at 09:01

## 2022-01-01 RX ADMIN — PANTOPRAZOLE SODIUM 40 MG: 40 GRANULE, DELAYED RELEASE ORAL at 09:03

## 2022-01-01 RX ADMIN — INSULIN ASPART 2 UNITS: 100 INJECTION, SOLUTION INTRAVENOUS; SUBCUTANEOUS at 06:04

## 2022-01-01 RX ADMIN — SEVELAMER CARBONATE 0.8 G: 800 POWDER, FOR SUSPENSION ORAL at 01:04

## 2022-01-01 RX ADMIN — INSULIN ASPART 4 UNITS: 100 INJECTION, SOLUTION INTRAVENOUS; SUBCUTANEOUS at 06:01

## 2022-01-01 RX ADMIN — I.V. FAT EMULSION 250 ML: 20 EMULSION INTRAVENOUS at 11:01

## 2022-01-01 RX ADMIN — TICAGRELOR 90 MG: 90 TABLET ORAL at 12:02

## 2022-01-01 RX ADMIN — SEVELAMER CARBONATE 0.8 G: 800 POWDER, FOR SUSPENSION ORAL at 08:06

## 2022-01-01 RX ADMIN — IPRATROPIUM BROMIDE AND ALBUTEROL SULFATE 3 ML: 2.5; .5 SOLUTION RESPIRATORY (INHALATION) at 01:04

## 2022-01-01 RX ADMIN — LINEZOLID 600 MG: 600 INJECTION, SOLUTION INTRAVENOUS at 03:05

## 2022-01-01 RX ADMIN — INSULIN DETEMIR 25 UNITS: 100 INJECTION, SOLUTION SUBCUTANEOUS at 08:04

## 2022-01-01 RX ADMIN — PANTOPRAZOLE SODIUM 40 MG: 40 GRANULE, DELAYED RELEASE ORAL at 08:03

## 2022-01-01 RX ADMIN — HEPARIN SODIUM 5000 UNITS: 5000 INJECTION INTRAVENOUS; SUBCUTANEOUS at 03:06

## 2022-01-01 RX ADMIN — ACETAMINOPHEN 500 MG: 500 TABLET ORAL at 08:06

## 2022-01-01 RX ADMIN — SEVELAMER CARBONATE 0.8 G: 800 POWDER, FOR SUSPENSION ORAL at 05:01

## 2022-01-01 RX ADMIN — HEPARIN SODIUM 5000 UNITS: 5000 INJECTION INTRAVENOUS; SUBCUTANEOUS at 09:05

## 2022-01-01 RX ADMIN — METHYLPREDNISOLONE SODIUM SUCCINATE 40 MG: 40 INJECTION, POWDER, FOR SOLUTION INTRAMUSCULAR; INTRAVENOUS at 06:03

## 2022-01-01 RX ADMIN — HYDROCODONE BITARTRATE AND ACETAMINOPHEN 1 TABLET: 5; 325 TABLET ORAL at 01:04

## 2022-01-01 RX ADMIN — HYDRALAZINE HYDROCHLORIDE 25 MG: 25 TABLET ORAL at 01:04

## 2022-01-01 RX ADMIN — ACETAMINOPHEN 500 MG: 500 TABLET ORAL at 11:04

## 2022-01-01 RX ADMIN — SEVELAMER CARBONATE 0.8 G: 800 POWDER, FOR SUSPENSION ORAL at 11:02

## 2022-01-01 RX ADMIN — ACETYLCYSTEINE 2 ML: 200 SOLUTION ORAL; RESPIRATORY (INHALATION) at 08:06

## 2022-01-01 RX ADMIN — HYDRALAZINE HYDROCHLORIDE 25 MG: 25 TABLET ORAL at 05:03

## 2022-01-01 RX ADMIN — HEPARIN SODIUM 5000 UNITS: 5000 INJECTION INTRAVENOUS; SUBCUTANEOUS at 02:05

## 2022-01-01 RX ADMIN — SCOPALAMINE 1 PATCH: 1 PATCH, EXTENDED RELEASE TRANSDERMAL at 02:06

## 2022-01-01 RX ADMIN — INSULIN ASPART 2 UNITS: 100 INJECTION, SOLUTION INTRAVENOUS; SUBCUTANEOUS at 12:06

## 2022-01-01 RX ADMIN — ACETYLCYSTEINE 2 ML: 200 SOLUTION ORAL; RESPIRATORY (INHALATION) at 01:06

## 2022-01-01 RX ADMIN — INSULIN DETEMIR 15 UNITS: 100 INJECTION, SOLUTION SUBCUTANEOUS at 08:06

## 2022-01-01 RX ADMIN — ACETAMINOPHEN 500 MG: 500 TABLET ORAL at 07:06

## 2022-01-01 RX ADMIN — TOBRAMYCIN SULFATE 240 MG: 40 INJECTION, SOLUTION INTRAMUSCULAR; INTRAVENOUS at 03:04

## 2022-01-01 RX ADMIN — HEPARIN SODIUM 4000 UNITS: 1000 INJECTION INTRAVENOUS; SUBCUTANEOUS at 03:03

## 2022-01-01 RX ADMIN — DILTIAZEM HYDROCHLORIDE 90 MG: 90 TABLET, FILM COATED ORAL at 12:04

## 2022-01-01 RX ADMIN — DILTIAZEM HYDROCHLORIDE 30 MG: 30 TABLET, FILM COATED ORAL at 12:02

## 2022-01-01 RX ADMIN — IPRATROPIUM BROMIDE AND ALBUTEROL SULFATE 3 ML: 2.5; .5 SOLUTION RESPIRATORY (INHALATION) at 01:06

## 2022-01-01 RX ADMIN — PREDNISONE 5 MG: 5 TABLET ORAL at 08:04

## 2022-01-01 RX ADMIN — DILTIAZEM HYDROCHLORIDE 60 MG: 60 TABLET, FILM COATED ORAL at 05:03

## 2022-01-01 RX ADMIN — INSULIN ASPART 2 UNITS: 100 INJECTION, SOLUTION INTRAVENOUS; SUBCUTANEOUS at 06:01

## 2022-01-01 RX ADMIN — HEPARIN SODIUM 4000 UNITS: 1000 INJECTION INTRAVENOUS; SUBCUTANEOUS at 11:02

## 2022-01-01 RX ADMIN — PIPERACILLIN AND TAZOBACTAM 4.5 G: 4; .5 INJECTION, POWDER, LYOPHILIZED, FOR SOLUTION INTRAVENOUS at 06:03

## 2022-01-01 RX ADMIN — FENTANYL 1 PATCH: 50 PATCH, EXTENDED RELEASE TRANSDERMAL at 01:02

## 2022-01-01 RX ADMIN — PHENYLEPHRINE HYDROCHLORIDE 200 MCG: 10 INJECTION INTRAVENOUS at 09:01

## 2022-01-01 RX ADMIN — NOREPINEPHRINE BITARTRATE 0.04 MCG/KG/MIN: 1 INJECTION, SOLUTION, CONCENTRATE INTRAVENOUS at 05:01

## 2022-01-01 RX ADMIN — PANTOPRAZOLE SODIUM 40 MG: 40 GRANULE, DELAYED RELEASE ORAL at 10:01

## 2022-01-01 RX ADMIN — GUAIFENESIN 200 MG: 200 SOLUTION ORAL at 06:03

## 2022-01-01 RX ADMIN — COLLAGENASE SANTYL: 250 OINTMENT TOPICAL at 09:06

## 2022-01-01 RX ADMIN — HYDRALAZINE HYDROCHLORIDE 25 MG: 25 TABLET ORAL at 06:04

## 2022-01-01 RX ADMIN — FENTANYL CITRATE 50 MCG/HR: 50 INJECTION, SOLUTION INTRAMUSCULAR; INTRAVENOUS at 05:01

## 2022-01-01 RX ADMIN — HEPARIN SODIUM 5000 UNITS: 5000 INJECTION INTRAVENOUS; SUBCUTANEOUS at 06:02

## 2022-01-01 RX ADMIN — FENTANYL CITRATE 25 MCG/HR: 50 INJECTION, SOLUTION INTRAMUSCULAR; INTRAVENOUS at 08:01

## 2022-01-01 RX ADMIN — HEPARIN SODIUM 4000 UNITS: 1000 INJECTION INTRAVENOUS; SUBCUTANEOUS at 12:04

## 2022-01-01 RX ADMIN — ACETAMINOPHEN 500 MG: 500 TABLET ORAL at 03:05

## 2022-01-01 RX ADMIN — IPRATROPIUM BROMIDE AND ALBUTEROL SULFATE 3 ML: 2.5; .5 SOLUTION RESPIRATORY (INHALATION) at 12:03

## 2022-01-01 RX ADMIN — SODIUM CHLORIDE 125 ML: 9 INJECTION, SOLUTION INTRAVENOUS at 11:05

## 2022-01-01 RX ADMIN — SEVELAMER CARBONATE 0.8 G: 800 POWDER, FOR SUSPENSION ORAL at 06:05

## 2022-01-01 RX ADMIN — TICAGRELOR 90 MG: 90 TABLET ORAL at 08:02

## 2022-01-01 RX ADMIN — SEVELAMER CARBONATE 0.8 G: 800 POWDER, FOR SUSPENSION ORAL at 12:02

## 2022-01-01 RX ADMIN — INSULIN ASPART 1 UNITS: 100 INJECTION, SOLUTION INTRAVENOUS; SUBCUTANEOUS at 01:04

## 2022-01-01 RX ADMIN — GENTAMICIN SULFATE 260 MG: 40 INJECTION, SOLUTION INTRAMUSCULAR; INTRAVENOUS at 04:01

## 2022-01-01 RX ADMIN — SEVELAMER CARBONATE 0.8 G: 800 POWDER, FOR SUSPENSION ORAL at 10:01

## 2022-01-01 RX ADMIN — FENTANYL 1 PATCH: 50 PATCH, EXTENDED RELEASE TRANSDERMAL at 12:02

## 2022-01-01 RX ADMIN — DILTIAZEM HYDROCHLORIDE 30 MG: 30 TABLET, FILM COATED ORAL at 11:01

## 2022-01-01 RX ADMIN — SEVELAMER CARBONATE 0.8 G: 800 POWDER, FOR SUSPENSION ORAL at 09:01

## 2022-01-01 RX ADMIN — SEVELAMER CARBONATE 0.8 G: 800 POWDER, FOR SUSPENSION ORAL at 01:02

## 2022-01-01 RX ADMIN — INSULIN DETEMIR 20 UNITS: 100 INJECTION, SOLUTION SUBCUTANEOUS at 08:03

## 2022-01-01 RX ADMIN — AMPICILLIN SODIUM AND SULBACTAM SODIUM 1.5 G: 1; .5 INJECTION, POWDER, FOR SOLUTION INTRAMUSCULAR; INTRAVENOUS at 09:01

## 2022-01-01 RX ADMIN — GUAIFENESIN 200 MG: 200 SOLUTION ORAL at 06:06

## 2022-01-01 RX ADMIN — HEPARIN SODIUM 5000 UNITS: 5000 INJECTION INTRAVENOUS; SUBCUTANEOUS at 05:06

## 2022-01-01 RX ADMIN — IPRATROPIUM BROMIDE AND ALBUTEROL SULFATE 3 ML: 2.5; .5 SOLUTION RESPIRATORY (INHALATION) at 01:05

## 2022-01-01 RX ADMIN — DEXTROSE MONOHYDRATE 12.5 G: 25 INJECTION, SOLUTION INTRAVENOUS at 12:01

## 2022-01-01 RX ADMIN — METHYLPREDNISOLONE SODIUM SUCCINATE 40 MG: 40 INJECTION, POWDER, FOR SOLUTION INTRAMUSCULAR; INTRAVENOUS at 11:03

## 2022-01-01 RX ADMIN — ACETAMINOPHEN 500 MG: 500 TABLET ORAL at 12:06

## 2022-01-01 RX ADMIN — SODIUM CHLORIDE 2000 ML/HR: 9 INJECTION, SOLUTION INTRAVENOUS at 12:01

## 2022-01-01 RX ADMIN — INSULIN ASPART 2 UNITS: 100 INJECTION, SOLUTION INTRAVENOUS; SUBCUTANEOUS at 05:02

## 2022-01-01 RX ADMIN — PIPERACILLIN AND TAZOBACTAM 4.5 G: 4; .5 INJECTION, POWDER, LYOPHILIZED, FOR SOLUTION INTRAVENOUS at 05:04

## 2022-01-01 RX ADMIN — HEPARIN SODIUM 4000 UNITS: 1000 INJECTION INTRAVENOUS; SUBCUTANEOUS at 04:05

## 2022-01-01 RX ADMIN — DILTIAZEM HYDROCHLORIDE 90 MG: 90 TABLET, FILM COATED ORAL at 12:06

## 2022-01-01 RX ADMIN — PREDNISONE 5 MG: 5 TABLET ORAL at 08:05

## 2022-01-01 RX ADMIN — SODIUM CHLORIDE 250 ML: 9 INJECTION, SOLUTION INTRAVENOUS at 01:06

## 2022-01-01 RX ADMIN — SEVELAMER CARBONATE 0.8 G: 800 POWDER, FOR SUSPENSION ORAL at 12:04

## 2022-01-01 RX ADMIN — FENTANYL 1 PATCH: 50 PATCH, EXTENDED RELEASE TRANSDERMAL at 11:02

## 2022-01-01 RX ADMIN — AMPHOTERICIN B 450 MG: 50 INJECTION, POWDER, LYOPHILIZED, FOR SOLUTION INTRAVENOUS at 06:01

## 2022-01-01 RX ADMIN — MEROPENEM 500 MG: 500 INJECTION, POWDER, FOR SOLUTION INTRAVENOUS at 06:05

## 2022-01-01 RX ADMIN — HEPARIN SODIUM 5000 UNITS: 5000 INJECTION INTRAVENOUS; SUBCUTANEOUS at 06:03

## 2022-01-01 RX ADMIN — HYDRALAZINE HYDROCHLORIDE 25 MG: 25 TABLET ORAL at 02:04

## 2022-01-01 RX ADMIN — SEVELAMER CARBONATE 0.8 G: 800 POWDER, FOR SUSPENSION ORAL at 01:03

## 2022-01-01 RX ADMIN — INSULIN DETEMIR 25 UNITS: 100 INJECTION, SOLUTION SUBCUTANEOUS at 09:03

## 2022-01-01 RX ADMIN — PIPERACILLIN AND TAZOBACTAM 4.5 G: 4; .5 INJECTION, POWDER, LYOPHILIZED, FOR SOLUTION INTRAVENOUS at 01:05

## 2022-01-01 RX ADMIN — HEPARIN SODIUM 5000 UNITS: 5000 INJECTION INTRAVENOUS; SUBCUTANEOUS at 05:02

## 2022-01-01 RX ADMIN — HEPARIN SODIUM 5000 UNITS: 5000 INJECTION INTRAVENOUS; SUBCUTANEOUS at 03:01

## 2022-01-01 RX ADMIN — HYDRALAZINE HYDROCHLORIDE 25 MG: 25 TABLET ORAL at 03:04

## 2022-01-01 RX ADMIN — DILTIAZEM HYDROCHLORIDE 90 MG: 90 TABLET, FILM COATED ORAL at 11:03

## 2022-01-01 RX ADMIN — PIPERACILLIN AND TAZOBACTAM 4.5 G: 4; .5 INJECTION, POWDER, LYOPHILIZED, FOR SOLUTION INTRAVENOUS at 02:05

## 2022-01-01 RX ADMIN — ACETAMINOPHEN 500 MG: 500 TABLET ORAL at 01:06

## 2022-01-01 RX ADMIN — HEPARIN SODIUM 4000 UNITS: 1000 INJECTION INTRAVENOUS; SUBCUTANEOUS at 11:04

## 2022-01-01 RX ADMIN — INSULIN ASPART 1 UNITS: 100 INJECTION, SOLUTION INTRAVENOUS; SUBCUTANEOUS at 01:03

## 2022-01-01 RX ADMIN — IPRATROPIUM BROMIDE AND ALBUTEROL SULFATE 3 ML: 2.5; .5 SOLUTION RESPIRATORY (INHALATION) at 08:05

## 2022-01-01 RX ADMIN — HEPARIN SODIUM 5000 UNITS: 5000 INJECTION INTRAVENOUS; SUBCUTANEOUS at 11:01

## 2022-01-01 RX ADMIN — HYDROCODONE BITARTRATE AND ACETAMINOPHEN 1 TABLET: 5; 325 TABLET ORAL at 04:04

## 2022-01-01 RX ADMIN — DILTIAZEM HYDROCHLORIDE 90 MG: 90 TABLET, FILM COATED ORAL at 01:04

## 2022-01-01 RX ADMIN — ACETAMINOPHEN 500 MG: 500 TABLET ORAL at 04:05

## 2022-01-01 RX ADMIN — ACETYLCYSTEINE 2 ML: 200 SOLUTION ORAL; RESPIRATORY (INHALATION) at 11:06

## 2022-01-01 RX ADMIN — INSULIN DETEMIR 20 UNITS: 100 INJECTION, SOLUTION SUBCUTANEOUS at 09:01

## 2022-01-01 RX ADMIN — INSULIN DETEMIR 25 UNITS: 100 INJECTION, SOLUTION SUBCUTANEOUS at 10:04

## 2022-01-01 RX ADMIN — IPRATROPIUM BROMIDE AND ALBUTEROL SULFATE 3 ML: 2.5; .5 SOLUTION RESPIRATORY (INHALATION) at 07:03

## 2022-01-01 RX ADMIN — MEROPENEM 500 MG: 500 INJECTION, POWDER, FOR SOLUTION INTRAVENOUS at 05:06

## 2022-01-01 RX ADMIN — GUAIFENESIN 200 MG: 200 SOLUTION ORAL at 05:03

## 2022-01-01 RX ADMIN — SEVELAMER CARBONATE 0.8 G: 800 POWDER, FOR SUSPENSION ORAL at 06:04

## 2022-01-01 RX ADMIN — PANTOPRAZOLE SODIUM 40 MG: 40 GRANULE, DELAYED RELEASE ORAL at 08:02

## 2022-01-01 RX ADMIN — ASCORBIC ACID, VITAMIN A PALMITATE, CHOLECALCIFEROL, THIAMINE HYDROCHLORIDE, RIBOFLAVIN-5 PHOSPHATE SODIUM, PYRIDOXINE HYDROCHLORIDE, NIACINAMIDE, DEXPANTHENOL, ALPHA-TOCOPHEROL ACETATE, VITAMIN K1, FOLIC ACID, BIOTIN, CYANOCOBALAMIN: 200; 3300; 200; 6; 3.6; 6; 40; 15; 10; 150; 600; 60; 5 INJECTION, SOLUTION INTRAVENOUS at 09:01

## 2022-01-01 RX ADMIN — DILTIAZEM HYDROCHLORIDE 60 MG: 60 TABLET, FILM COATED ORAL at 06:03

## 2022-01-01 RX ADMIN — MEROPENEM 500 MG: 500 INJECTION, POWDER, FOR SOLUTION INTRAVENOUS at 06:06

## 2022-01-01 RX ADMIN — INSULIN ASPART 4 UNITS: 100 INJECTION, SOLUTION INTRAVENOUS; SUBCUTANEOUS at 12:03

## 2022-01-01 RX ADMIN — PANTOPRAZOLE SODIUM 40 MG: 40 GRANULE, DELAYED RELEASE ORAL at 09:04

## 2022-01-01 RX ADMIN — SEVELAMER CARBONATE 0.8 G: 800 POWDER, FOR SUSPENSION ORAL at 11:06

## 2022-01-01 RX ADMIN — DEXTROSE MONOHYDRATE 12.5 G: 25 INJECTION, SOLUTION INTRAVENOUS at 07:01

## 2022-01-01 RX ADMIN — HEPARIN SODIUM 5000 UNITS: 5000 INJECTION INTRAVENOUS; SUBCUTANEOUS at 05:01

## 2022-01-01 RX ADMIN — INSULIN ASPART 8 UNITS: 100 INJECTION, SOLUTION INTRAVENOUS; SUBCUTANEOUS at 06:03

## 2022-01-01 RX ADMIN — PIPERACILLIN AND TAZOBACTAM 4.5 G: 4; .5 INJECTION, POWDER, LYOPHILIZED, FOR SOLUTION INTRAVENOUS at 05:05

## 2022-01-01 RX ADMIN — TICAGRELOR 90 MG: 90 TABLET ORAL at 10:01

## 2022-01-01 RX ADMIN — PANTOPRAZOLE SODIUM 40 MG: 40 GRANULE, DELAYED RELEASE ORAL at 10:02

## 2022-01-01 RX ADMIN — ACETAMINOPHEN 500 MG: 500 TABLET ORAL at 11:01

## 2022-01-01 RX ADMIN — GUAIFENESIN 200 MG: 200 SOLUTION ORAL at 06:02

## 2022-01-01 RX ADMIN — PROPOFOL 50 MCG/KG/MIN: 10 INJECTION, EMULSION INTRAVENOUS at 08:01

## 2022-01-01 RX ADMIN — GUAIFENESIN 200 MG: 200 SOLUTION ORAL at 04:02

## 2022-01-01 RX ADMIN — SODIUM CHLORIDE, POTASSIUM CHLORIDE, SODIUM LACTATE AND CALCIUM CHLORIDE 250 ML: 600; 310; 30; 20 INJECTION, SOLUTION INTRAVENOUS at 03:06

## 2022-01-01 RX ADMIN — ACETAMINOPHEN 500 MG: 500 TABLET ORAL at 08:05

## 2022-01-01 RX ADMIN — IPRATROPIUM BROMIDE AND ALBUTEROL SULFATE 3 ML: 2.5; .5 SOLUTION RESPIRATORY (INHALATION) at 01:03

## 2022-01-01 RX ADMIN — MEROPENEM 500 MG: 500 INJECTION, POWDER, FOR SOLUTION INTRAVENOUS at 05:05

## 2022-01-01 RX ADMIN — EPOETIN ALFA-EPBX 10000 UNITS: 10000 INJECTION, SOLUTION INTRAVENOUS; SUBCUTANEOUS at 11:06

## 2022-01-01 RX ADMIN — SILVER SULFADIAZINE: 10 CREAM TOPICAL at 04:03

## 2022-01-01 RX ADMIN — SODIUM CHLORIDE 100 ML/HR: 9 INJECTION, SOLUTION INTRAVENOUS at 11:02

## 2022-01-01 RX ADMIN — PIPERACILLIN AND TAZOBACTAM 4.5 G: 4; .5 INJECTION, POWDER, FOR SOLUTION INTRAVENOUS at 01:02

## 2022-01-01 RX ADMIN — SEVELAMER CARBONATE 0.8 G: 800 POWDER, FOR SUSPENSION ORAL at 11:03

## 2022-01-01 RX ADMIN — HEPARIN SODIUM 5000 UNITS: 5000 INJECTION INTRAVENOUS; SUBCUTANEOUS at 02:04

## 2022-01-01 RX ADMIN — LINEZOLID 600 MG: 600 INJECTION, SOLUTION INTRAVENOUS at 04:06

## 2022-01-01 RX ADMIN — INSULIN ASPART 6 UNITS: 100 INJECTION, SOLUTION INTRAVENOUS; SUBCUTANEOUS at 07:03

## 2022-01-01 RX ADMIN — HEPARIN SODIUM 5000 UNITS: 5000 INJECTION INTRAVENOUS; SUBCUTANEOUS at 10:06

## 2022-01-01 RX ADMIN — FENTANYL 1 PATCH: 50 PATCH, EXTENDED RELEASE TRANSDERMAL at 11:03

## 2022-01-01 RX ADMIN — PANTOPRAZOLE SODIUM 40 MG: 40 GRANULE, DELAYED RELEASE ORAL at 12:03

## 2022-01-01 RX ADMIN — INSULIN DETEMIR 25 UNITS: 100 INJECTION, SOLUTION SUBCUTANEOUS at 09:05

## 2022-01-01 RX ADMIN — INSULIN ASPART 2 UNITS: 100 INJECTION, SOLUTION INTRAVENOUS; SUBCUTANEOUS at 12:04

## 2022-01-01 RX ADMIN — HEPARIN SODIUM 5000 UNITS: 5000 INJECTION INTRAVENOUS; SUBCUTANEOUS at 03:04

## 2022-01-01 RX ADMIN — HYDROCODONE BITARTRATE AND ACETAMINOPHEN 1 TABLET: 5; 325 TABLET ORAL at 03:04

## 2022-01-01 RX ADMIN — HEPARIN SODIUM 4000 UNITS: 1000 INJECTION INTRAVENOUS; SUBCUTANEOUS at 10:05

## 2022-01-01 RX ADMIN — PROPOFOL 41.9 MCG/KG/MIN: 10 INJECTION, EMULSION INTRAVENOUS at 08:01

## 2022-01-01 RX ADMIN — INSULIN ASPART 2 UNITS: 100 INJECTION, SOLUTION INTRAVENOUS; SUBCUTANEOUS at 12:03

## 2022-01-01 RX ADMIN — SODIUM CHLORIDE 2000 ML: 9 INJECTION, SOLUTION INTRAVENOUS at 09:03

## 2022-01-01 RX ADMIN — SILVER SULFADIAZINE: 10 CREAM TOPICAL at 05:04

## 2022-01-01 RX ADMIN — LINEZOLID 600 MG: 600 INJECTION, SOLUTION INTRAVENOUS at 03:06

## 2022-01-01 RX ADMIN — HEPARIN SODIUM 5000 UNITS: 5000 INJECTION INTRAVENOUS; SUBCUTANEOUS at 10:04

## 2022-01-01 RX ADMIN — PROPOFOL 36.51 MCG/KG/MIN: 10 INJECTION, EMULSION INTRAVENOUS at 01:01

## 2022-01-01 RX ADMIN — PANTOPRAZOLE SODIUM 40 MG: 40 GRANULE, DELAYED RELEASE ORAL at 08:01

## 2022-01-01 RX ADMIN — SEVELAMER CARBONATE 0.8 G: 800 POWDER, FOR SUSPENSION ORAL at 07:06

## 2022-01-01 RX ADMIN — HEPARIN SODIUM 5000 UNITS: 5000 INJECTION INTRAVENOUS; SUBCUTANEOUS at 10:01

## 2022-01-01 RX ADMIN — INSULIN ASPART 2 UNITS: 100 INJECTION, SOLUTION INTRAVENOUS; SUBCUTANEOUS at 01:01

## 2022-01-01 RX ADMIN — DILTIAZEM HYDROCHLORIDE 60 MG: 60 TABLET, FILM COATED ORAL at 08:02

## 2022-01-01 RX ADMIN — HEPARIN SODIUM 4000 UNITS: 1000 INJECTION INTRAVENOUS; SUBCUTANEOUS at 12:03

## 2022-01-01 RX ADMIN — SODIUM CHLORIDE: 9 INJECTION, SOLUTION INTRAVENOUS at 11:05

## 2022-01-01 RX ADMIN — SEVELAMER CARBONATE 0.8 G: 800 POWDER, FOR SUSPENSION ORAL at 09:06

## 2022-01-01 RX ADMIN — LINEZOLID 600 MG: 600 INJECTION, SOLUTION INTRAVENOUS at 04:05

## 2022-01-01 RX ADMIN — ACETYLCYSTEINE 2 ML: 200 SOLUTION ORAL; RESPIRATORY (INHALATION) at 09:06

## 2022-01-01 RX ADMIN — HEPARIN SODIUM 5000 UNITS: 5000 INJECTION INTRAVENOUS; SUBCUTANEOUS at 02:01

## 2022-01-01 RX ADMIN — COLLAGENASE SANTYL: 250 OINTMENT TOPICAL at 05:05

## 2022-01-01 RX ADMIN — METOPROLOL TARTRATE 12.5 MG: 25 TABLET, FILM COATED ORAL at 09:06

## 2022-01-01 RX ADMIN — HEPARIN SODIUM 5000 UNITS: 5000 INJECTION INTRAVENOUS; SUBCUTANEOUS at 06:05

## 2022-01-01 RX ADMIN — COLLAGENASE SANTYL: 250 OINTMENT TOPICAL at 12:06

## 2022-01-01 RX ADMIN — SEVELAMER CARBONATE 0.8 G: 800 POWDER, FOR SUSPENSION ORAL at 09:02

## 2022-01-01 RX ADMIN — DEXTROSE MONOHYDRATE 12.5 G: 25 INJECTION, SOLUTION INTRAVENOUS at 05:01

## 2022-01-01 RX ADMIN — VANCOMYCIN HYDROCHLORIDE 1500 MG: 5 INJECTION, POWDER, LYOPHILIZED, FOR SOLUTION INTRAVENOUS at 09:04

## 2022-01-01 RX ADMIN — HYDRALAZINE HYDROCHLORIDE 25 MG: 25 TABLET ORAL at 06:03

## 2022-01-01 RX ADMIN — INSULIN DETEMIR 15 UNITS: 100 INJECTION, SOLUTION SUBCUTANEOUS at 09:06

## 2022-01-01 RX ADMIN — HEPARIN SODIUM 5000 UNITS: 5000 INJECTION INTRAVENOUS; SUBCUTANEOUS at 01:01

## 2022-01-01 RX ADMIN — GUAIFENESIN 200 MG: 200 SOLUTION ORAL at 11:06

## 2022-01-01 RX ADMIN — SEVELAMER CARBONATE 0.8 G: 800 POWDER, FOR SUSPENSION ORAL at 12:01

## 2022-01-01 RX ADMIN — DEXTROSE MONOHYDRATE 12.5 G: 25 INJECTION, SOLUTION INTRAVENOUS at 10:03

## 2022-01-01 RX ADMIN — FENTANYL 1 PATCH: 50 PATCH, EXTENDED RELEASE TRANSDERMAL at 01:03

## 2022-01-01 RX ADMIN — EPOETIN ALFA-EPBX 10000 UNITS: 10000 INJECTION, SOLUTION INTRAVENOUS; SUBCUTANEOUS at 12:06

## 2022-01-01 RX ADMIN — FENTANYL CITRATE 63 MCG/HR: 50 INJECTION, SOLUTION INTRAMUSCULAR; INTRAVENOUS at 07:01

## 2022-01-01 RX ADMIN — ROCURONIUM BROMIDE 30 MG: 10 INJECTION, SOLUTION INTRAVENOUS at 10:01

## 2022-01-01 RX ADMIN — DILTIAZEM HYDROCHLORIDE 90 MG: 90 TABLET, FILM COATED ORAL at 01:05

## 2022-01-01 RX ADMIN — I.V. FAT EMULSION 250 ML: 20 EMULSION INTRAVENOUS at 09:01

## 2022-01-01 RX ADMIN — HYDRALAZINE HYDROCHLORIDE 25 MG: 25 TABLET ORAL at 09:03

## 2022-01-01 RX ADMIN — HEPARIN SODIUM 5000 UNITS: 5000 INJECTION INTRAVENOUS; SUBCUTANEOUS at 01:02

## 2022-01-01 RX ADMIN — HEPARIN SODIUM 5000 UNITS: 5000 INJECTION INTRAVENOUS; SUBCUTANEOUS at 05:05

## 2022-01-01 RX ADMIN — SEVELAMER CARBONATE 0.8 G: 800 POWDER, FOR SUSPENSION ORAL at 06:06

## 2022-01-01 RX ADMIN — HEPARIN SODIUM 5000 UNITS: 5000 INJECTION INTRAVENOUS; SUBCUTANEOUS at 04:06

## 2022-01-01 RX ADMIN — INSULIN ASPART 2 UNITS: 100 INJECTION, SOLUTION INTRAVENOUS; SUBCUTANEOUS at 12:01

## 2022-01-01 RX ADMIN — FENTANYL CITRATE 25 MCG: 50 INJECTION INTRAMUSCULAR; INTRAVENOUS at 09:01

## 2022-01-01 RX ADMIN — POTASSIUM IODIDE 5 DROP: 1 SOLUTION ORAL at 08:06

## 2022-01-01 RX ADMIN — METOPROLOL TARTRATE 12.5 MG: 25 TABLET, FILM COATED ORAL at 11:05

## 2022-01-01 RX ADMIN — NOREPINEPHRINE BITARTRATE 1.2 MCG/KG/MIN: 1 SOLUTION INTRAVENOUS at 12:06

## 2022-01-01 RX ADMIN — SODIUM CHLORIDE: 9 INJECTION, SOLUTION INTRAVENOUS at 09:05

## 2022-01-01 RX ADMIN — PANTOPRAZOLE SODIUM 40 MG: 40 GRANULE, DELAYED RELEASE ORAL at 10:03

## 2022-01-01 RX ADMIN — HYDRALAZINE HYDROCHLORIDE 25 MG: 25 TABLET ORAL at 02:03

## 2022-01-01 RX ADMIN — HEPARIN SODIUM 4000 UNITS: 1000 INJECTION INTRAVENOUS; SUBCUTANEOUS at 02:01

## 2022-01-01 RX ADMIN — TICAGRELOR 90 MG: 90 TABLET ORAL at 08:03

## 2022-01-01 RX ADMIN — INSULIN ASPART 6 UNITS: 100 INJECTION, SOLUTION INTRAVENOUS; SUBCUTANEOUS at 12:01

## 2022-01-01 RX ADMIN — ACETAMINOPHEN 500 MG: 500 TABLET ORAL at 01:01

## 2022-01-01 RX ADMIN — INSULIN ASPART 6 UNITS: 100 INJECTION, SOLUTION INTRAVENOUS; SUBCUTANEOUS at 05:01

## 2022-01-01 RX ADMIN — HEPARIN SODIUM 5000 UNITS: 5000 INJECTION INTRAVENOUS; SUBCUTANEOUS at 06:01

## 2022-01-01 RX ADMIN — HEPARIN SODIUM 4000 UNITS: 1000 INJECTION INTRAVENOUS; SUBCUTANEOUS at 05:03

## 2022-01-01 RX ADMIN — INSULIN DETEMIR 25 UNITS: 100 INJECTION, SOLUTION SUBCUTANEOUS at 08:05

## 2022-01-01 RX ADMIN — INSULIN ASPART 1 UNITS: 100 INJECTION, SOLUTION INTRAVENOUS; SUBCUTANEOUS at 07:01

## 2022-01-01 RX ADMIN — PANTOPRAZOLE SODIUM 40 MG: 40 GRANULE, DELAYED RELEASE ORAL at 12:01

## 2022-01-01 RX ADMIN — LINEZOLID 600 MG: 600 TABLET, FILM COATED ORAL at 10:06

## 2022-01-01 RX ADMIN — DILTIAZEM HYDROCHLORIDE 60 MG: 60 TABLET, FILM COATED ORAL at 11:02

## 2022-01-01 RX ADMIN — PROPOFOL 36.51 MCG/KG/MIN: 10 INJECTION, EMULSION INTRAVENOUS at 03:01

## 2022-01-01 RX ADMIN — METHYLPREDNISOLONE SODIUM SUCCINATE 20 MG: 40 INJECTION, POWDER, FOR SOLUTION INTRAMUSCULAR; INTRAVENOUS at 03:03

## 2022-01-01 RX ADMIN — SODIUM CHLORIDE: 9 INJECTION, SOLUTION INTRAVENOUS at 09:01

## 2022-01-01 RX ADMIN — HEPARIN SODIUM 5000 UNITS: 5000 INJECTION INTRAVENOUS; SUBCUTANEOUS at 03:02

## 2022-01-01 RX ADMIN — SEVELAMER CARBONATE 0.8 G: 800 POWDER, FOR SUSPENSION ORAL at 04:05

## 2022-01-01 RX ADMIN — MORPHINE SULFATE 4 MG: 4 INJECTION, SOLUTION INTRAMUSCULAR; INTRAVENOUS at 04:06

## 2022-01-01 RX ADMIN — FENTANYL CITRATE 70 MCG/HR: 50 INJECTION, SOLUTION INTRAMUSCULAR; INTRAVENOUS at 03:01

## 2022-01-01 RX ADMIN — FENTANYL CITRATE 70 MCG/HR: 50 INJECTION, SOLUTION INTRAMUSCULAR; INTRAVENOUS at 04:01

## 2022-01-01 RX ADMIN — SEVELAMER CARBONATE 0.8 G: 800 POWDER, FOR SUSPENSION ORAL at 07:01

## 2022-01-01 RX ADMIN — INSULIN ASPART 1 UNITS: 100 INJECTION, SOLUTION INTRAVENOUS; SUBCUTANEOUS at 12:01

## 2022-01-01 RX ADMIN — INSULIN ASPART 1 UNITS: 100 INJECTION, SOLUTION INTRAVENOUS; SUBCUTANEOUS at 12:04

## 2022-01-01 RX ADMIN — NOREPINEPHRINE BITARTRATE 1 MCG/KG/MIN: 1 SOLUTION INTRAVENOUS at 02:06

## 2022-01-01 RX ADMIN — INSULIN ASPART 1 UNITS: 100 INJECTION, SOLUTION INTRAVENOUS; SUBCUTANEOUS at 12:02

## 2022-01-01 RX ADMIN — NOREPINEPHRINE BITARTRATE 1.2 MCG/KG/MIN: 1 SOLUTION INTRAVENOUS at 01:06

## 2022-01-01 RX ADMIN — HEPARIN SODIUM 4000 UNITS: 1000 INJECTION INTRAVENOUS; SUBCUTANEOUS at 06:04

## 2022-01-01 RX ADMIN — HEPARIN SODIUM 4000 UNITS: 1000 INJECTION INTRAVENOUS; SUBCUTANEOUS at 11:03

## 2022-01-01 RX ADMIN — INSULIN ASPART 4 UNITS: 100 INJECTION, SOLUTION INTRAVENOUS; SUBCUTANEOUS at 06:02

## 2022-01-01 RX ADMIN — MUPIROCIN: 20 OINTMENT TOPICAL at 08:02

## 2022-01-01 RX ADMIN — SILVER SULFADIAZINE: 10 CREAM TOPICAL at 05:05

## 2022-01-01 RX ADMIN — PREDNISONE 10 MG: 10 TABLET ORAL at 09:03

## 2022-01-01 RX ADMIN — INSULIN ASPART 2 UNITS: 100 INJECTION, SOLUTION INTRAVENOUS; SUBCUTANEOUS at 11:02

## 2022-01-01 RX ADMIN — HEPARIN SODIUM 4000 UNITS: 1000 INJECTION INTRAVENOUS; SUBCUTANEOUS at 02:05

## 2022-01-01 RX ADMIN — PREDNISONE 10 MG: 10 TABLET ORAL at 08:03

## 2022-01-01 RX ADMIN — ROCURONIUM BROMIDE 30 MG: 10 INJECTION, SOLUTION INTRAVENOUS at 08:01

## 2022-01-01 RX ADMIN — PREDNISONE 2.5 MG: 2.5 TABLET ORAL at 09:05

## 2022-01-01 RX ADMIN — DILTIAZEM HYDROCHLORIDE 60 MG: 60 TABLET, FILM COATED ORAL at 01:02

## 2022-01-01 RX ADMIN — TOBRAMYCIN SULFATE 235 MG: 40 INJECTION, SOLUTION INTRAMUSCULAR; INTRAVENOUS at 10:05

## 2022-01-01 RX ADMIN — PROPOFOL 25.92 MCG/KG/MIN: 10 INJECTION, EMULSION INTRAVENOUS at 04:01

## 2022-01-01 RX ADMIN — NOREPINEPHRINE BITARTRATE 1 MCG/KG/MIN: 1 SOLUTION INTRAVENOUS at 11:06

## 2022-01-01 RX ADMIN — PROPOFOL 36.51 MCG/KG/MIN: 10 INJECTION, EMULSION INTRAVENOUS at 10:01

## 2022-01-01 RX ADMIN — PIPERACILLIN AND TAZOBACTAM 4.5 G: 4; .5 INJECTION, POWDER, LYOPHILIZED, FOR SOLUTION INTRAVENOUS at 07:03

## 2022-01-01 RX ADMIN — VASOPRESSIN: 20 INJECTION, SOLUTION INTRAVENOUS at 09:01

## 2022-01-01 RX ADMIN — METHYLPREDNISOLONE SODIUM SUCCINATE 40 MG: 40 INJECTION, POWDER, FOR SOLUTION INTRAMUSCULAR; INTRAVENOUS at 12:03

## 2022-01-01 RX ADMIN — PANTOPRAZOLE SODIUM 40 MG: 40 GRANULE, DELAYED RELEASE ORAL at 09:06

## 2022-01-01 RX ADMIN — SODIUM CHLORIDE: 9 INJECTION, SOLUTION INTRAVENOUS at 10:01

## 2022-01-01 RX ADMIN — MUPIROCIN: 20 OINTMENT TOPICAL at 09:03

## 2022-01-01 RX ADMIN — COLLAGENASE SANTYL: 250 OINTMENT TOPICAL at 06:05

## 2022-01-01 RX ADMIN — IPRATROPIUM BROMIDE AND ALBUTEROL SULFATE 3 ML: 2.5; .5 SOLUTION RESPIRATORY (INHALATION) at 02:04

## 2022-01-01 RX ADMIN — PIPERACILLIN AND TAZOBACTAM 4.5 G: 4; .5 INJECTION, POWDER, FOR SOLUTION INTRAVENOUS at 03:04

## 2022-01-01 RX ADMIN — AMPICILLIN SODIUM AND SULBACTAM SODIUM 1.5 G: 1; .5 INJECTION, POWDER, FOR SOLUTION INTRAMUSCULAR; INTRAVENOUS at 07:01

## 2022-01-01 RX ADMIN — PANTOPRAZOLE SODIUM 40 MG: 40 GRANULE, DELAYED RELEASE ORAL at 12:02

## 2022-01-01 RX ADMIN — INSULIN ASPART 1 UNITS: 100 INJECTION, SOLUTION INTRAVENOUS; SUBCUTANEOUS at 11:02

## 2022-01-01 RX ADMIN — HEPARIN SODIUM 5000 UNITS: 5000 INJECTION INTRAVENOUS; SUBCUTANEOUS at 07:01

## 2022-01-01 RX ADMIN — MUPIROCIN: 20 OINTMENT TOPICAL at 12:03

## 2022-01-01 RX ADMIN — ONDANSETRON 8 MG: 2 INJECTION INTRAMUSCULAR; INTRAVENOUS at 04:04

## 2022-01-01 RX ADMIN — DILTIAZEM HYDROCHLORIDE 60 MG: 60 TABLET, FILM COATED ORAL at 07:03

## 2022-01-01 RX ADMIN — SODIUM CHLORIDE 125 ML: 9 INJECTION, SOLUTION INTRAVENOUS at 04:05

## 2022-01-01 RX ADMIN — SODIUM CHLORIDE 2000 ML: 9 INJECTION, SOLUTION INTRAVENOUS at 07:04

## 2022-01-01 RX ADMIN — HEPARIN SODIUM 5000 UNITS: 5000 INJECTION INTRAVENOUS; SUBCUTANEOUS at 02:02

## 2022-01-01 RX ADMIN — SEVELAMER CARBONATE 0.8 G: 800 POWDER, FOR SUSPENSION ORAL at 08:02

## 2022-01-01 RX ADMIN — PROPOFOL 40.16 MCG/KG/MIN: 10 INJECTION, EMULSION INTRAVENOUS at 11:01

## 2022-01-01 RX ADMIN — POTASSIUM IODIDE 5 DROP: 1 SOLUTION ORAL at 10:06

## 2022-01-01 RX ADMIN — FENTANYL 1 PATCH: 50 PATCH, EXTENDED RELEASE TRANSDERMAL at 12:03

## 2022-01-01 RX ADMIN — SODIUM CHLORIDE 2000 ML: 9 INJECTION, SOLUTION INTRAVENOUS at 07:06

## 2022-01-01 RX ADMIN — SEVELAMER CARBONATE 0.8 G: 800 POWDER, FOR SUSPENSION ORAL at 08:01

## 2022-01-01 RX ADMIN — PROPOFOL 41.6 MCG/KG/MIN: 10 INJECTION, EMULSION INTRAVENOUS at 02:01

## 2022-01-01 RX ADMIN — AMPHOTERICIN B 450 MG: 50 INJECTION, POWDER, LYOPHILIZED, FOR SOLUTION INTRAVENOUS at 05:02

## 2022-01-01 RX ADMIN — HEPARIN SODIUM 5000 UNITS: 5000 INJECTION INTRAVENOUS; SUBCUTANEOUS at 02:06

## 2022-01-01 RX ADMIN — METOPROLOL TARTRATE 12.5 MG: 25 TABLET, FILM COATED ORAL at 09:04

## 2022-01-01 RX ADMIN — PIPERACILLIN AND TAZOBACTAM 4.5 G: 4; .5 INJECTION, POWDER, LYOPHILIZED, FOR SOLUTION INTRAVENOUS at 04:04

## 2022-01-01 RX ADMIN — INSULIN ASPART 6 UNITS: 100 INJECTION, SOLUTION INTRAVENOUS; SUBCUTANEOUS at 05:06

## 2022-01-01 RX ADMIN — INSULIN ASPART 6 UNITS: 100 INJECTION, SOLUTION INTRAVENOUS; SUBCUTANEOUS at 06:03

## 2022-01-01 RX ADMIN — FENTANYL CITRATE 62.5 MCG/HR: 50 INJECTION, SOLUTION INTRAMUSCULAR; INTRAVENOUS at 05:01

## 2022-01-01 RX ADMIN — DEXTROSE MONOHYDRATE 12.5 G: 25 INJECTION, SOLUTION INTRAVENOUS at 11:01

## 2022-01-01 RX ADMIN — PIPERACILLIN AND TAZOBACTAM 4.5 G: 4; .5 INJECTION, POWDER, FOR SOLUTION INTRAVENOUS at 02:04

## 2022-01-01 RX ADMIN — HEPARIN SODIUM 5000 UNITS: 5000 INJECTION INTRAVENOUS; SUBCUTANEOUS at 10:05

## 2022-01-01 RX ADMIN — INSULIN ASPART 2 UNITS: 100 INJECTION, SOLUTION INTRAVENOUS; SUBCUTANEOUS at 05:05

## 2022-01-01 RX ADMIN — SEVELAMER CARBONATE 0.8 G: 800 POWDER, FOR SUSPENSION ORAL at 06:02

## 2022-01-01 RX ADMIN — EPOETIN ALFA-EPBX 10000 UNITS: 10000 INJECTION, SOLUTION INTRAVENOUS; SUBCUTANEOUS at 07:06

## 2022-01-01 RX ADMIN — HEPARIN SODIUM 5000 UNITS: 5000 INJECTION INTRAVENOUS; SUBCUTANEOUS at 04:02

## 2022-01-01 RX ADMIN — INSULIN DETEMIR 25 UNITS: 100 INJECTION, SOLUTION SUBCUTANEOUS at 08:06

## 2022-01-01 RX ADMIN — PREDNISONE 5 MG: 5 TABLET ORAL at 09:04

## 2022-01-01 RX ADMIN — SODIUM CHLORIDE: 9 INJECTION, SOLUTION INTRAVENOUS at 06:03

## 2022-01-01 RX ADMIN — DILTIAZEM HYDROCHLORIDE 90 MG: 90 TABLET, FILM COATED ORAL at 01:06

## 2022-01-01 RX ADMIN — HEPARIN SODIUM 4000 UNITS: 1000 INJECTION INTRAVENOUS; SUBCUTANEOUS at 10:06

## 2022-01-01 RX ADMIN — TICAGRELOR 90 MG: 90 TABLET ORAL at 10:02

## 2022-01-01 RX ADMIN — HEPARIN SODIUM 4000 UNITS: 1000 INJECTION INTRAVENOUS; SUBCUTANEOUS at 12:01

## 2022-01-01 RX ADMIN — NOREPINEPHRINE BITARTRATE 0.04 MCG/KG/MIN: 1 INJECTION, SOLUTION, CONCENTRATE INTRAVENOUS at 06:01

## 2022-01-01 RX ADMIN — SEVELAMER CARBONATE 0.8 G: 800 POWDER, FOR SUSPENSION ORAL at 07:02

## 2022-01-01 RX ADMIN — IPRATROPIUM BROMIDE AND ALBUTEROL SULFATE 3 ML: 2.5; .5 SOLUTION RESPIRATORY (INHALATION) at 06:03

## 2022-01-01 RX ADMIN — ALBUTEROL SULFATE 2.5 MG: 2.5 SOLUTION RESPIRATORY (INHALATION) at 12:01

## 2022-01-01 RX ADMIN — POTASSIUM IODIDE 5 DROP: 1 SOLUTION ORAL at 03:06

## 2022-01-01 RX ADMIN — NOREPINEPHRINE BITARTRATE 1 MCG/KG/MIN: 1 SOLUTION INTRAVENOUS at 07:06

## 2022-01-01 RX ADMIN — HEPARIN SODIUM 4000 UNITS: 1000 INJECTION INTRAVENOUS; SUBCUTANEOUS at 04:03

## 2022-01-01 RX ADMIN — INSULIN DETEMIR 25 UNITS: 100 INJECTION, SOLUTION SUBCUTANEOUS at 08:03

## 2022-01-01 RX ADMIN — ROCURONIUM BROMIDE 50 MG: 10 INJECTION, SOLUTION INTRAVENOUS at 09:04

## 2022-01-01 RX ADMIN — PROPOFOL 45 MCG/KG/MIN: 10 INJECTION, EMULSION INTRAVENOUS at 01:01

## 2022-01-01 RX ADMIN — SODIUM CHLORIDE 1000 ML: 9 INJECTION, SOLUTION INTRAVENOUS at 12:02

## 2022-01-01 RX ADMIN — INSULIN ASPART 6 UNITS: 100 INJECTION, SOLUTION INTRAVENOUS; SUBCUTANEOUS at 05:03

## 2022-01-01 RX ADMIN — PROPOFOL 35.05 MCG/KG/MIN: 10 INJECTION, EMULSION INTRAVENOUS at 08:01

## 2022-01-01 RX ADMIN — PIPERACILLIN SODIUM AND TAZOBACTAM SODIUM 4.5 G: 4; .5 INJECTION, POWDER, LYOPHILIZED, FOR SOLUTION INTRAVENOUS at 06:01

## 2022-01-01 RX ADMIN — ACETAMINOPHEN 500 MG: 500 TABLET ORAL at 09:01

## 2022-01-01 RX ADMIN — PREDNISONE 5 MG: 5 TABLET ORAL at 11:04

## 2022-01-01 RX ADMIN — FENTANYL CITRATE 50 MCG: 50 INJECTION INTRAMUSCULAR; INTRAVENOUS at 12:02

## 2022-01-01 RX ADMIN — HEPARIN SODIUM 5000 UNITS: 5000 INJECTION INTRAVENOUS; SUBCUTANEOUS at 06:06

## 2022-01-01 RX ADMIN — CLINDAMYCIN IN 5 PERCENT DEXTROSE 600 MG: 12 INJECTION, SOLUTION INTRAVENOUS at 06:04

## 2022-01-01 RX ADMIN — VANCOMYCIN HYDROCHLORIDE 2000 MG: 1 INJECTION, POWDER, LYOPHILIZED, FOR SOLUTION INTRAVENOUS at 04:01

## 2022-01-01 RX ADMIN — SODIUM CHLORIDE 125 ML: 9 INJECTION, SOLUTION INTRAVENOUS at 01:06

## 2022-01-01 RX ADMIN — PANTOPRAZOLE SODIUM 40 MG: 40 GRANULE, DELAYED RELEASE ORAL at 09:05

## 2022-01-01 RX ADMIN — PROPOFOL 41.62 MCG/KG/MIN: 10 INJECTION, EMULSION INTRAVENOUS at 06:01

## 2022-01-01 RX ADMIN — HEPARIN SODIUM 4000 UNITS: 1000 INJECTION INTRAVENOUS; SUBCUTANEOUS at 12:06

## 2022-01-01 RX ADMIN — PIPERACILLIN AND TAZOBACTAM 4.5 G: 4; .5 INJECTION, POWDER, FOR SOLUTION INTRAVENOUS at 04:04

## 2022-01-01 RX ADMIN — METOPROLOL TARTRATE 5 MG: 5 INJECTION INTRAVENOUS at 04:01

## 2022-01-01 RX ADMIN — PIPERACILLIN AND TAZOBACTAM 4.5 G: 4; .5 INJECTION, POWDER, FOR SOLUTION INTRAVENOUS at 02:02

## 2022-01-01 RX ADMIN — MEROPENEM 1 G: 1 INJECTION, POWDER, FOR SOLUTION INTRAVENOUS at 05:05

## 2022-01-01 RX ADMIN — HEPARIN SODIUM 4000 UNITS: 1000 INJECTION INTRAVENOUS; SUBCUTANEOUS at 09:06

## 2022-01-01 RX ADMIN — GUAIFENESIN 200 MG: 200 SOLUTION ORAL at 11:02

## 2022-01-01 RX ADMIN — ACETYLCYSTEINE 2 ML: 200 SOLUTION ORAL; RESPIRATORY (INHALATION) at 02:06

## 2022-01-01 RX ADMIN — CALCIUM GLUCONATE: 98 INJECTION, SOLUTION INTRAVENOUS at 09:01

## 2022-01-01 RX ADMIN — HEPARIN SODIUM 4000 UNITS: 1000 INJECTION INTRAVENOUS; SUBCUTANEOUS at 04:04

## 2022-01-01 RX ADMIN — GUAIFENESIN 200 MG: 200 SOLUTION ORAL at 09:02

## 2022-01-01 RX ADMIN — SODIUM CHLORIDE: 9 INJECTION, SOLUTION INTRAVENOUS at 11:01

## 2022-01-01 RX ADMIN — METOPROLOL TARTRATE 12.5 MG: 25 TABLET, FILM COATED ORAL at 10:04

## 2022-01-01 RX ADMIN — FENTANYL 1 PATCH: 25 PATCH, EXTENDED RELEASE TRANSDERMAL at 07:03

## 2022-01-01 RX ADMIN — SEVELAMER CARBONATE 0.8 G: 800 POWDER, FOR SUSPENSION ORAL at 09:04

## 2022-01-01 RX ADMIN — ACETAMINOPHEN 1000 MG: 500 TABLET ORAL at 06:06

## 2022-01-01 RX ADMIN — MORPHINE SULFATE 4 MG: 4 INJECTION, SOLUTION INTRAMUSCULAR; INTRAVENOUS at 10:04

## 2022-01-01 RX ADMIN — COLLAGENASE SANTYL: 250 OINTMENT TOPICAL at 05:06

## 2022-01-01 RX ADMIN — INSULIN ASPART 2 UNITS: 100 INJECTION, SOLUTION INTRAVENOUS; SUBCUTANEOUS at 06:02

## 2022-01-01 RX ADMIN — INSULIN ASPART 2 UNITS: 100 INJECTION, SOLUTION INTRAVENOUS; SUBCUTANEOUS at 07:02

## 2022-01-01 RX ADMIN — DEXTROSE MONOHYDRATE: 100 INJECTION, SOLUTION INTRAVENOUS at 07:01

## 2022-01-01 RX ADMIN — SEVELAMER CARBONATE 0.8 G: 800 POWDER, FOR SUSPENSION ORAL at 01:01

## 2022-01-01 RX ADMIN — PIPERACILLIN SODIUM AND TAZOBACTAM SODIUM 4.5 G: 4; .5 INJECTION, POWDER, LYOPHILIZED, FOR SOLUTION INTRAVENOUS at 01:02

## 2022-01-01 RX ADMIN — SODIUM CHLORIDE: 9 INJECTION, SOLUTION INTRAVENOUS at 12:06

## 2022-01-01 RX ADMIN — HEPARIN SODIUM 5000 UNITS: 5000 INJECTION INTRAVENOUS; SUBCUTANEOUS at 09:03

## 2022-01-01 RX ADMIN — COLLAGENASE SANTYL: 250 OINTMENT TOPICAL at 12:03

## 2022-01-01 RX ADMIN — GUAIFENESIN 200 MG: 200 SOLUTION ORAL at 10:02

## 2022-01-01 RX ADMIN — SEVELAMER CARBONATE 0.8 G: 800 POWDER, FOR SUSPENSION ORAL at 06:03

## 2022-01-01 RX ADMIN — HEPARIN SODIUM 4000 UNITS: 1000 INJECTION INTRAVENOUS; SUBCUTANEOUS at 10:04

## 2022-01-01 RX ADMIN — SEVELAMER CARBONATE 0.8 G: 800 POWDER, FOR SUSPENSION ORAL at 07:03

## 2022-01-01 RX ADMIN — PANTOPRAZOLE SODIUM 40 MG: 40 GRANULE, DELAYED RELEASE ORAL at 11:01

## 2022-01-01 RX ADMIN — ACETAMINOPHEN 500 MG: 500 TABLET ORAL at 02:01

## 2022-01-01 RX ADMIN — PROPOFOL 40.16 MCG/KG/MIN: 10 INJECTION, EMULSION INTRAVENOUS at 04:01

## 2022-01-01 RX ADMIN — SODIUM CHLORIDE: 9 INJECTION, SOLUTION INTRAVENOUS at 04:01

## 2022-01-01 RX ADMIN — POTASSIUM IODIDE 5 DROP: 1 SOLUTION ORAL at 01:06

## 2022-01-01 RX ADMIN — VANCOMYCIN HYDROCHLORIDE 1500 MG: 10 INJECTION, POWDER, LYOPHILIZED, FOR SOLUTION INTRAVENOUS at 08:05

## 2022-01-01 RX ADMIN — PROPOFOL 50 MCG/KG/MIN: 10 INJECTION, EMULSION INTRAVENOUS at 01:01

## 2022-01-01 RX ADMIN — PHENYLEPHRINE HYDROCHLORIDE 100 MCG: 10 INJECTION INTRAVENOUS at 09:05

## 2022-01-01 RX ADMIN — HEPARIN SODIUM 5000 UNITS: 5000 INJECTION INTRAVENOUS; SUBCUTANEOUS at 10:02

## 2022-01-01 RX ADMIN — COLLAGENASE SANTYL: 250 OINTMENT TOPICAL at 07:05

## 2022-01-01 RX ADMIN — INSULIN ASPART 4 UNITS: 100 INJECTION, SOLUTION INTRAVENOUS; SUBCUTANEOUS at 06:03

## 2022-01-01 RX ADMIN — PHENYLEPHRINE HYDROCHLORIDE 200 MCG: 10 INJECTION INTRAVENOUS at 02:03

## 2022-01-01 RX ADMIN — SODIUM CHLORIDE 100 ML/HR: 9 INJECTION, SOLUTION INTRAVENOUS at 03:04

## 2022-01-01 RX ADMIN — HEPARIN SODIUM 4000 UNITS: 1000 INJECTION INTRAVENOUS; SUBCUTANEOUS at 03:04

## 2022-01-01 RX ADMIN — CALCIUM GLUCONATE: 98 INJECTION, SOLUTION INTRAVENOUS at 10:01

## 2022-01-01 RX ADMIN — DEXTROSE MONOHYDRATE 12.5 G: 25 INJECTION, SOLUTION INTRAVENOUS at 11:03

## 2022-01-01 RX ADMIN — FENTANYL CITRATE 70 MCG/HR: 50 INJECTION, SOLUTION INTRAMUSCULAR; INTRAVENOUS at 05:01

## 2022-01-01 RX ADMIN — GUAIFENESIN 200 MG: 200 SOLUTION ORAL at 01:02

## 2022-01-01 RX ADMIN — PHENYLEPHRINE HYDROCHLORIDE 200 MCG: 10 INJECTION INTRAVENOUS at 09:03

## 2022-01-01 RX ADMIN — SODIUM CHLORIDE: 9 INJECTION, SOLUTION INTRAVENOUS at 06:06

## 2022-01-01 RX ADMIN — SILVER SULFADIAZINE: 10 CREAM TOPICAL at 03:03

## 2022-01-01 RX ADMIN — NOREPINEPHRINE BITARTRATE 0.05 MCG/KG/MIN: 1 INJECTION, SOLUTION, CONCENTRATE INTRAVENOUS at 02:01

## 2022-01-01 RX ADMIN — HEPARIN SODIUM 4000 UNITS: 1000 INJECTION INTRAVENOUS; SUBCUTANEOUS at 12:02

## 2022-01-01 RX ADMIN — VANCOMYCIN HYDROCHLORIDE 1500 MG: 10 INJECTION, POWDER, LYOPHILIZED, FOR SOLUTION INTRAVENOUS at 06:04

## 2022-01-01 RX ADMIN — COLLAGENASE SANTYL: 250 OINTMENT TOPICAL at 06:06

## 2022-01-01 RX ADMIN — PANTOPRAZOLE SODIUM 40 MG: 40 GRANULE, DELAYED RELEASE ORAL at 11:06

## 2022-01-01 RX ADMIN — CLINDAMYCIN IN 5 PERCENT DEXTROSE 600 MG: 12 INJECTION, SOLUTION INTRAVENOUS at 01:04

## 2022-01-01 RX ADMIN — ROCURONIUM BROMIDE 20 MG: 10 INJECTION, SOLUTION INTRAVENOUS at 02:03

## 2022-01-01 RX ADMIN — SODIUM CHLORIDE 4000 ML: 9 INJECTION, SOLUTION INTRAVENOUS at 12:04

## 2022-01-01 RX ADMIN — SODIUM CHLORIDE: 9 INJECTION, SOLUTION INTRAVENOUS at 12:05

## 2022-01-01 RX ADMIN — INSULIN ASPART 2 UNITS: 100 INJECTION, SOLUTION INTRAVENOUS; SUBCUTANEOUS at 05:04

## 2022-01-01 RX ADMIN — HEPARIN SODIUM 5000 UNITS: 5000 INJECTION INTRAVENOUS; SUBCUTANEOUS at 02:03

## 2022-01-01 RX ADMIN — SODIUM CHLORIDE: 9 INJECTION, SOLUTION INTRAVENOUS at 10:05

## 2022-01-01 RX ADMIN — HEPARIN SODIUM 5000 UNITS: 5000 INJECTION INTRAVENOUS; SUBCUTANEOUS at 09:02

## 2022-01-01 RX ADMIN — PROPOFOL 40 MCG/KG/MIN: 10 INJECTION, EMULSION INTRAVENOUS at 11:01

## 2022-01-01 RX ADMIN — SODIUM CHLORIDE: 9 INJECTION, SOLUTION INTRAVENOUS at 11:06

## 2022-01-01 RX ADMIN — ROCURONIUM BROMIDE 50 MG: 10 INJECTION, SOLUTION INTRAVENOUS at 01:01

## 2022-01-01 RX ADMIN — PROPOFOL 50 MCG/KG/MIN: 10 INJECTION, EMULSION INTRAVENOUS at 12:01

## 2022-01-01 RX ADMIN — SILVER SULFADIAZINE: 10 CREAM TOPICAL at 03:05

## 2022-01-01 RX ADMIN — DEXTROSE MONOHYDRATE 25 G: 25 INJECTION, SOLUTION INTRAVENOUS at 06:01

## 2022-01-01 RX ADMIN — MUPIROCIN: 20 OINTMENT TOPICAL at 08:03

## 2022-01-01 RX ADMIN — CLINDAMYCIN IN 5 PERCENT DEXTROSE 600 MG: 12 INJECTION, SOLUTION INTRAVENOUS at 05:04

## 2022-01-01 RX ADMIN — EPINEPHRINE 1 MG: 0.1 INJECTION INTRACARDIAC; INTRAVENOUS at 09:06

## 2022-01-01 RX ADMIN — INSULIN ASPART 8 UNITS: 100 INJECTION, SOLUTION INTRAVENOUS; SUBCUTANEOUS at 05:01

## 2022-01-01 RX ADMIN — CEFAZOLIN 2 G: 1 INJECTION, POWDER, FOR SOLUTION INTRAMUSCULAR; INTRAVENOUS; PARENTERAL at 12:02

## 2022-01-01 RX ADMIN — HYDROCODONE BITARTRATE AND ACETAMINOPHEN 1 TABLET: 5; 325 TABLET ORAL at 06:04

## 2022-01-01 RX ADMIN — ACETAMINOPHEN 500 MG: 500 TABLET ORAL at 04:02

## 2022-01-01 RX ADMIN — SEVELAMER CARBONATE 0.8 G: 800 POWDER, FOR SUSPENSION ORAL at 06:01

## 2022-01-01 RX ADMIN — SODIUM CHLORIDE 100 ML: 9 INJECTION, SOLUTION INTRAVENOUS at 11:04

## 2022-01-01 RX ADMIN — LINEZOLID 600 MG: 600 TABLET, FILM COATED ORAL at 11:06

## 2022-01-01 RX ADMIN — SILVER SULFADIAZINE: 10 CREAM TOPICAL at 06:04

## 2022-01-01 RX ADMIN — INSULIN ASPART 2 UNITS: 100 INJECTION, SOLUTION INTRAVENOUS; SUBCUTANEOUS at 06:05

## 2022-01-01 RX ADMIN — METHYLPREDNISOLONE SODIUM SUCCINATE 20 MG: 40 INJECTION, POWDER, FOR SOLUTION INTRAMUSCULAR; INTRAVENOUS at 08:03

## 2022-01-01 RX ADMIN — SILVER SULFADIAZINE: 10 CREAM TOPICAL at 05:03

## 2022-01-01 RX ADMIN — SODIUM CHLORIDE 100 ML/HR: 9 INJECTION, SOLUTION INTRAVENOUS at 11:04

## 2022-01-01 RX ADMIN — AMPICILLIN SODIUM AND SULBACTAM SODIUM 1.5 G: 1; .5 INJECTION, POWDER, FOR SOLUTION INTRAMUSCULAR; INTRAVENOUS at 08:01

## 2022-01-01 RX ADMIN — EPOETIN ALFA-EPBX 10000 UNITS: 10000 INJECTION, SOLUTION INTRAVENOUS; SUBCUTANEOUS at 01:06

## 2022-01-01 RX ADMIN — MUPIROCIN: 20 OINTMENT TOPICAL at 10:03

## 2022-01-01 RX ADMIN — TOBRAMYCIN SULFATE 240 MG: 40 INJECTION, SOLUTION INTRAMUSCULAR; INTRAVENOUS at 08:04

## 2022-01-01 RX ADMIN — SODIUM CHLORIDE: 9 INJECTION, SOLUTION INTRAVENOUS at 02:06

## 2022-01-01 RX ADMIN — TOBRAMYCIN 220 MG: 40 INJECTION INTRAMUSCULAR; INTRAVENOUS at 04:05

## 2022-01-01 RX ADMIN — PROPOFOL 41.62 MCG/KG/MIN: 10 INJECTION, EMULSION INTRAVENOUS at 01:01

## 2022-01-01 RX ADMIN — METHYLPREDNISOLONE SODIUM SUCCINATE 20 MG: 40 INJECTION, POWDER, FOR SOLUTION INTRAMUSCULAR; INTRAVENOUS at 09:03

## 2022-01-01 RX ADMIN — ACETAMINOPHEN 500 MG: 500 TABLET ORAL at 08:04

## 2022-01-01 RX ADMIN — NOREPINEPHRINE BITARTRATE 1 MCG/KG/MIN: 1 SOLUTION INTRAVENOUS at 03:06

## 2022-01-01 RX ADMIN — ACETAMINOPHEN 500 MG: 500 TABLET ORAL at 08:01

## 2022-01-01 RX ADMIN — INSULIN ASPART 1 UNITS: 100 INJECTION, SOLUTION INTRAVENOUS; SUBCUTANEOUS at 11:04

## 2022-01-01 RX ADMIN — DEXTROSE MONOHYDRATE 12.5 G: 25 INJECTION, SOLUTION INTRAVENOUS at 08:06

## 2022-01-01 RX ADMIN — SILVER SULFADIAZINE: 10 CREAM TOPICAL at 05:02

## 2022-01-01 RX ADMIN — I.V. FAT EMULSION 250 ML: 20 EMULSION INTRAVENOUS at 08:01

## 2022-01-01 RX ADMIN — PROPOFOL 35 MCG/KG/MIN: 10 INJECTION, EMULSION INTRAVENOUS at 07:01

## 2022-01-01 RX ADMIN — INSULIN ASPART 4 UNITS: 100 INJECTION, SOLUTION INTRAVENOUS; SUBCUTANEOUS at 01:01

## 2022-01-01 RX ADMIN — IPRATROPIUM BROMIDE AND ALBUTEROL SULFATE 3 ML: 2.5; .5 SOLUTION RESPIRATORY (INHALATION) at 09:03

## 2022-01-01 RX ADMIN — SODIUM CHLORIDE 100 ML: 9 INJECTION, SOLUTION INTRAVENOUS at 12:04

## 2022-01-01 RX ADMIN — SILVER SULFADIAZINE: 10 CREAM TOPICAL at 04:04

## 2022-01-01 RX ADMIN — MUPIROCIN: 20 OINTMENT TOPICAL at 09:02

## 2022-01-01 RX ADMIN — PIPERACILLIN SODIUM AND TAZOBACTAM SODIUM 4.5 G: 4; .5 INJECTION, POWDER, LYOPHILIZED, FOR SOLUTION INTRAVENOUS at 02:02

## 2022-01-01 RX ADMIN — SODIUM CHLORIDE 100 ML/HR: 9 INJECTION, SOLUTION INTRAVENOUS at 01:04

## 2022-01-01 RX ADMIN — ACETAMINOPHEN 500 MG: 500 TABLET ORAL at 05:01

## 2022-01-01 RX ADMIN — GUAIFENESIN 200 MG: 200 SOLUTION ORAL at 03:02

## 2022-01-01 RX ADMIN — HYDROCODONE BITARTRATE AND ACETAMINOPHEN 1 TABLET: 5; 325 TABLET ORAL at 09:04

## 2022-01-01 RX ADMIN — SILVER SULFADIAZINE: 10 CREAM TOPICAL at 04:05

## 2022-01-01 RX ADMIN — DEXTROSE MONOHYDRATE 12.5 G: 25 INJECTION, SOLUTION INTRAVENOUS at 06:01

## 2022-01-01 RX ADMIN — MORPHINE SULFATE 4 MG: 4 INJECTION, SOLUTION INTRAMUSCULAR; INTRAVENOUS at 04:04

## 2022-01-01 RX ADMIN — HEPARIN SODIUM 4000 UNITS: 1000 INJECTION INTRAVENOUS; SUBCUTANEOUS at 07:04

## 2022-01-01 RX ADMIN — METHYLPREDNISOLONE SODIUM SUCCINATE 20 MG: 40 INJECTION, POWDER, FOR SOLUTION INTRAMUSCULAR; INTRAVENOUS at 10:03

## 2022-01-01 RX ADMIN — INSULIN DETEMIR 30 UNITS: 100 INJECTION, SOLUTION SUBCUTANEOUS at 09:03

## 2022-01-01 RX ADMIN — HEPARIN SODIUM 4000 UNITS: 1000 INJECTION INTRAVENOUS; SUBCUTANEOUS at 09:05

## 2022-01-01 RX ADMIN — TOBRAMYCIN SULFATE 220 MG: 40 INJECTION, SOLUTION INTRAMUSCULAR; INTRAVENOUS at 09:05

## 2022-01-01 RX ADMIN — INSULIN ASPART 3 UNITS: 100 INJECTION, SOLUTION INTRAVENOUS; SUBCUTANEOUS at 11:03

## 2022-01-01 RX ADMIN — VANCOMYCIN HYDROCHLORIDE 1500 MG: 5 INJECTION, POWDER, LYOPHILIZED, FOR SOLUTION INTRAVENOUS at 05:04

## 2022-01-01 RX ADMIN — ACETAMINOPHEN 500 MG: 500 TABLET ORAL at 04:03

## 2022-01-01 RX ADMIN — PIPERACILLIN AND TAZOBACTAM 4.5 G: 4; .5 INJECTION, POWDER, LYOPHILIZED, FOR SOLUTION INTRAVENOUS at 10:05

## 2022-01-01 RX ADMIN — SEVELAMER CARBONATE 0.8 G: 800 POWDER, FOR SUSPENSION ORAL at 04:02

## 2022-01-01 RX ADMIN — INSULIN ASPART 1 UNITS: 100 INJECTION, SOLUTION INTRAVENOUS; SUBCUTANEOUS at 11:05

## 2022-01-01 RX ADMIN — COLLAGENASE SANTYL: 250 OINTMENT TOPICAL at 11:06

## 2022-01-01 RX ADMIN — SODIUM CHLORIDE: 9 INJECTION, SOLUTION INTRAVENOUS at 11:04

## 2022-01-01 RX ADMIN — SEVELAMER CARBONATE 0.8 G: 800 POWDER, FOR SUSPENSION ORAL at 04:01

## 2022-01-01 RX ADMIN — SILVER SULFADIAZINE: 10 CREAM TOPICAL at 04:02

## 2022-01-01 RX ADMIN — PROPOFOL 40.16 MCG/KG/MIN: 10 INJECTION, EMULSION INTRAVENOUS at 07:01

## 2022-01-01 RX ADMIN — INSULIN DETEMIR 45 UNITS: 100 INJECTION, SOLUTION SUBCUTANEOUS at 09:01

## 2022-01-01 RX ADMIN — INSULIN ASPART 4 UNITS: 100 INJECTION, SOLUTION INTRAVENOUS; SUBCUTANEOUS at 05:03

## 2022-01-01 RX ADMIN — HEPARIN SODIUM 4000 UNITS: 1000 INJECTION INTRAVENOUS; SUBCUTANEOUS at 04:01

## 2022-01-01 RX ADMIN — HEPARIN SODIUM 4000 UNITS: 1000 INJECTION INTRAVENOUS; SUBCUTANEOUS at 01:04

## 2022-01-01 RX ADMIN — PIPERACILLIN AND TAZOBACTAM 4.5 G: 4; .5 INJECTION, POWDER, LYOPHILIZED, FOR SOLUTION INTRAVENOUS at 07:05

## 2022-01-01 RX ADMIN — INSULIN DETEMIR 20 UNITS: 100 INJECTION, SOLUTION SUBCUTANEOUS at 10:01

## 2022-01-01 RX ADMIN — TOBRAMYCIN SULFATE 220 MG: 40 INJECTION, SOLUTION INTRAMUSCULAR; INTRAVENOUS at 06:06

## 2022-01-01 RX ADMIN — CEFTRIAXONE 1 G: 1 INJECTION, POWDER, FOR SOLUTION INTRAMUSCULAR; INTRAVENOUS at 12:04

## 2022-01-01 RX ADMIN — DEXTROSE MONOHYDRATE 12.5 G: 25 INJECTION, SOLUTION INTRAVENOUS at 03:01

## 2022-01-01 RX ADMIN — ACETIC ACID: 250 IRRIGANT IRRIGATION at 12:03

## 2022-01-01 RX ADMIN — METOPROLOL TARTRATE 12.5 MG: 25 TABLET, FILM COATED ORAL at 12:05

## 2022-01-01 RX ADMIN — HEPARIN SODIUM 4000 UNITS: 1000 INJECTION INTRAVENOUS; SUBCUTANEOUS at 05:02

## 2022-01-01 RX ADMIN — HYDRALAZINE HYDROCHLORIDE 25 MG: 25 TABLET ORAL at 01:03

## 2022-01-01 RX ADMIN — INSULIN ASPART 4 UNITS: 100 INJECTION, SOLUTION INTRAVENOUS; SUBCUTANEOUS at 11:03

## 2022-01-01 RX ADMIN — MORPHINE SULFATE 4 MG: 4 INJECTION, SOLUTION INTRAMUSCULAR; INTRAVENOUS at 01:06

## 2022-01-01 RX ADMIN — HEPARIN SODIUM 4000 UNITS: 1000 INJECTION INTRAVENOUS; SUBCUTANEOUS at 01:03

## 2022-01-01 RX ADMIN — METOPROLOL TARTRATE 5 MG: 5 INJECTION INTRAVENOUS at 09:01

## 2022-01-01 RX ADMIN — HEPARIN SODIUM 4000 UNITS: 1000 INJECTION INTRAVENOUS; SUBCUTANEOUS at 09:01

## 2022-01-01 RX ADMIN — DILTIAZEM HYDROCHLORIDE 30 MG: 30 TABLET, FILM COATED ORAL at 05:02

## 2022-01-01 RX ADMIN — PHENYLEPHRINE HYDROCHLORIDE 200 MCG: 10 INJECTION INTRAVENOUS at 08:01

## 2022-01-01 RX ADMIN — SODIUM CHLORIDE 100 ML/HR: 9 INJECTION, SOLUTION INTRAVENOUS at 11:03

## 2022-01-01 RX ADMIN — SEVELAMER CARBONATE 0.8 G: 800 POWDER, FOR SUSPENSION ORAL at 07:04

## 2022-01-01 RX ADMIN — METOPROLOL TARTRATE 5 MG: 5 INJECTION INTRAVENOUS at 03:05

## 2022-01-01 RX ADMIN — ACETAMINOPHEN 500 MG: 500 TABLET ORAL at 11:06

## 2022-01-01 RX ADMIN — INSULIN ASPART 3 UNITS: 100 INJECTION, SOLUTION INTRAVENOUS; SUBCUTANEOUS at 12:01

## 2022-01-01 RX ADMIN — HEPARIN SODIUM 5000 UNITS: 5000 INJECTION INTRAVENOUS; SUBCUTANEOUS at 01:03

## 2022-01-01 RX ADMIN — SILVER SULFADIAZINE: 10 CREAM TOPICAL at 03:04

## 2022-01-01 RX ADMIN — DEXTROSE MONOHYDRATE: 100 INJECTION, SOLUTION INTRAVENOUS at 12:01

## 2022-01-01 RX ADMIN — INSULIN ASPART 8 UNITS: 100 INJECTION, SOLUTION INTRAVENOUS; SUBCUTANEOUS at 05:03

## 2022-01-01 RX ADMIN — INSULIN ASPART 10 UNITS: 100 INJECTION, SOLUTION INTRAVENOUS; SUBCUTANEOUS at 06:03

## 2022-01-01 RX ADMIN — COLLAGENASE SANTYL: 250 OINTMENT TOPICAL at 04:06

## 2022-01-01 RX ADMIN — DEXTROSE MONOHYDRATE 12.5 G: 25 INJECTION, SOLUTION INTRAVENOUS at 04:03

## 2022-01-01 RX ADMIN — INSULIN ASPART 3 UNITS: 100 INJECTION, SOLUTION INTRAVENOUS; SUBCUTANEOUS at 12:03

## 2022-01-01 RX ADMIN — DEXTROSE MONOHYDRATE 12.5 G: 25 INJECTION, SOLUTION INTRAVENOUS at 11:06

## 2022-01-01 RX ADMIN — PROPOFOL 35.05 MCG/KG/MIN: 10 INJECTION, EMULSION INTRAVENOUS at 03:01

## 2022-01-01 RX ADMIN — PIPERACILLIN AND TAZOBACTAM 4.5 G: 4; .5 INJECTION, POWDER, FOR SOLUTION INTRAVENOUS at 02:05

## 2022-01-01 RX ADMIN — COLLAGENASE SANTYL: 250 OINTMENT TOPICAL at 04:03

## 2022-01-01 RX ADMIN — DEXTROSE MONOHYDRATE 12.5 G: 25 INJECTION, SOLUTION INTRAVENOUS at 10:01

## 2022-01-01 RX ADMIN — INSULIN ASPART 6 UNITS: 100 INJECTION, SOLUTION INTRAVENOUS; SUBCUTANEOUS at 11:01

## 2022-01-01 RX ADMIN — CEFAZOLIN 2 G: 1 INJECTION, POWDER, FOR SOLUTION INTRAMUSCULAR; INTRAVENOUS; PARENTERAL at 09:03

## 2022-01-01 RX ADMIN — INSULIN ASPART 2 UNITS: 100 INJECTION, SOLUTION INTRAVENOUS; SUBCUTANEOUS at 02:04

## 2022-01-01 RX ADMIN — TOBRAMYCIN SULFATE 240 MG: 40 INJECTION, SOLUTION INTRAMUSCULAR; INTRAVENOUS at 10:04

## 2022-01-01 RX ADMIN — HEPARIN SODIUM 5000 UNITS: 5000 INJECTION INTRAVENOUS; SUBCUTANEOUS at 03:05

## 2022-01-01 RX ADMIN — DEXTROSE MONOHYDRATE: 100 INJECTION, SOLUTION INTRAVENOUS at 10:01

## 2022-01-01 RX ADMIN — HEPARIN SODIUM 5000 UNITS: 5000 INJECTION INTRAVENOUS; SUBCUTANEOUS at 12:01

## 2022-01-01 RX ADMIN — ACETIC ACID: 250 IRRIGANT IRRIGATION at 11:03

## 2022-01-01 RX ADMIN — ACETAMINOPHEN 500 MG: 500 TABLET ORAL at 11:05

## 2022-01-01 RX ADMIN — DILTIAZEM HYDROCHLORIDE 30 MG: 30 TABLET, FILM COATED ORAL at 06:02

## 2022-01-01 RX ADMIN — AMPHOTERICIN B 450 MG: 50 INJECTION, POWDER, LYOPHILIZED, FOR SOLUTION INTRAVENOUS at 07:01

## 2022-01-01 RX ADMIN — SILVER SULFADIAZINE: 10 CREAM TOPICAL at 06:02

## 2022-01-01 RX ADMIN — INSULIN DETEMIR 25 UNITS: 100 INJECTION, SOLUTION SUBCUTANEOUS at 09:06

## 2022-01-01 RX ADMIN — HEPARIN SODIUM 5000 UNITS: 5000 INJECTION INTRAVENOUS; SUBCUTANEOUS at 05:03

## 2022-01-01 RX ADMIN — INSULIN ASPART 8 UNITS: 100 INJECTION, SOLUTION INTRAVENOUS; SUBCUTANEOUS at 12:03

## 2022-01-01 RX ADMIN — DEXTROSE MONOHYDRATE 12.5 G: 25 INJECTION, SOLUTION INTRAVENOUS at 11:04

## 2022-01-01 RX ADMIN — NOREPINEPHRINE BITARTRATE 0.2 MCG/KG/MIN: 1 INJECTION, SOLUTION, CONCENTRATE INTRAVENOUS at 12:04

## 2022-01-01 RX ADMIN — HYDROCODONE BITARTRATE AND ACETAMINOPHEN 1 TABLET: 5; 325 TABLET ORAL at 10:04

## 2022-01-01 RX ADMIN — CEFTRIAXONE 1 G: 1 INJECTION, POWDER, FOR SOLUTION INTRAMUSCULAR; INTRAVENOUS at 01:04

## 2022-01-01 RX ADMIN — NOREPINEPHRINE BITARTRATE 1 MCG/KG/MIN: 1 SOLUTION INTRAVENOUS at 12:06

## 2022-01-01 RX ADMIN — FENTANYL CITRATE 25 MCG/HR: 50 INJECTION, SOLUTION INTRAMUSCULAR; INTRAVENOUS at 05:01

## 2022-01-01 RX ADMIN — SODIUM CHLORIDE: 9 INJECTION, SOLUTION INTRAVENOUS at 05:02

## 2022-01-01 RX ADMIN — ACETAMINOPHEN 500 MG: 500 TABLET ORAL at 05:06

## 2022-01-01 RX ADMIN — HEPARIN SODIUM 5000 UNITS: 5000 INJECTION INTRAVENOUS; SUBCUTANEOUS at 11:06

## 2022-01-01 RX ADMIN — DEXTROSE MONOHYDRATE 12.5 G: 25 INJECTION, SOLUTION INTRAVENOUS at 02:06

## 2022-01-01 RX ADMIN — SODIUM CHLORIDE: 9 INJECTION, SOLUTION INTRAVENOUS at 01:01

## 2022-01-01 RX ADMIN — IPRATROPIUM BROMIDE AND ALBUTEROL SULFATE 3 ML: 2.5; .5 SOLUTION RESPIRATORY (INHALATION) at 02:06

## 2022-01-01 RX ADMIN — METHYLPREDNISOLONE SODIUM SUCCINATE 40 MG: 40 INJECTION, POWDER, FOR SOLUTION INTRAMUSCULAR; INTRAVENOUS at 05:03

## 2022-01-01 RX ADMIN — SILVER SULFADIAZINE: 10 CREAM TOPICAL at 11:02

## 2022-01-01 RX ADMIN — TICAGRELOR 90 MG: 90 TABLET ORAL at 12:03

## 2022-01-01 RX ADMIN — COLLAGENASE SANTYL: 250 OINTMENT TOPICAL at 04:05

## 2022-01-01 RX ADMIN — CEFAZOLIN 2 G: 1 INJECTION, POWDER, FOR SOLUTION INTRAMUSCULAR; INTRAVENOUS; PARENTERAL at 02:03

## 2022-01-01 RX ADMIN — PROPOFOL 50 MCG/KG/MIN: 10 INJECTION, EMULSION INTRAVENOUS at 05:01

## 2022-01-01 RX ADMIN — INSULIN ASPART 2 UNITS: 100 INJECTION, SOLUTION INTRAVENOUS; SUBCUTANEOUS at 11:01

## 2022-01-01 RX ADMIN — DILTIAZEM HYDROCHLORIDE 30 MG: 30 TABLET, FILM COATED ORAL at 01:02

## 2022-01-01 RX ADMIN — SILVER SULFADIAZINE: 10 CREAM TOPICAL at 06:05

## 2022-01-01 RX ADMIN — ONDANSETRON 4 MG: 2 INJECTION INTRAMUSCULAR; INTRAVENOUS at 09:05

## 2022-01-01 RX ADMIN — SILVER SULFADIAZINE: 10 CREAM TOPICAL at 03:02

## 2022-01-01 RX ADMIN — PROPOFOL 50 MCG/KG/MIN: 10 INJECTION, EMULSION INTRAVENOUS at 09:01

## 2022-01-01 RX ADMIN — ACETAMINOPHEN 500 MG: 500 TABLET ORAL at 07:04

## 2022-01-01 RX ADMIN — ONDANSETRON 8 MG: 2 INJECTION INTRAMUSCULAR; INTRAVENOUS at 05:03

## 2022-01-01 RX ADMIN — INSULIN DETEMIR 20 UNITS: 100 INJECTION, SOLUTION SUBCUTANEOUS at 08:01

## 2022-01-01 RX ADMIN — SILVER SULFADIAZINE: 10 CREAM TOPICAL at 12:03

## 2022-01-01 RX ADMIN — DEXTROSE MONOHYDRATE: 100 INJECTION, SOLUTION INTRAVENOUS at 11:01

## 2022-01-01 RX ADMIN — HYDROCODONE BITARTRATE AND ACETAMINOPHEN 1 TABLET: 5; 325 TABLET ORAL at 05:04

## 2022-01-01 RX ADMIN — SODIUM CHLORIDE: 9 INJECTION, SOLUTION INTRAVENOUS at 01:06

## 2022-01-01 RX ADMIN — HEPARIN SODIUM 4000 UNITS: 1000 INJECTION INTRAVENOUS; SUBCUTANEOUS at 11:06

## 2022-01-01 RX ADMIN — SODIUM CHLORIDE: 9 INJECTION, SOLUTION INTRAVENOUS at 03:05

## 2022-01-01 RX ADMIN — PIPERACILLIN AND TAZOBACTAM 4.5 G: 4; .5 INJECTION, POWDER, LYOPHILIZED, FOR SOLUTION INTRAVENOUS at 05:02

## 2022-01-01 RX ADMIN — VANCOMYCIN HYDROCHLORIDE 1500 MG: 10 INJECTION, POWDER, LYOPHILIZED, FOR SOLUTION INTRAVENOUS at 09:04

## 2022-01-01 RX ADMIN — NOREPINEPHRINE BITARTRATE 1 MCG/KG/MIN: 1 SOLUTION INTRAVENOUS at 10:06

## 2022-01-01 RX ADMIN — PIPERACILLIN AND TAZOBACTAM 4.5 G: 4; .5 INJECTION, POWDER, LYOPHILIZED, FOR SOLUTION INTRAVENOUS at 06:04

## 2022-01-01 RX ADMIN — SEVELAMER CARBONATE 0.8 G: 800 POWDER, FOR SUSPENSION ORAL at 02:04

## 2022-01-01 RX ADMIN — INSULIN ASPART 4 UNITS: 100 INJECTION, SOLUTION INTRAVENOUS; SUBCUTANEOUS at 05:01

## 2022-01-01 RX ADMIN — HEPARIN SODIUM 4000 UNITS: 1000 INJECTION INTRAVENOUS; SUBCUTANEOUS at 10:02

## 2022-01-01 RX ADMIN — NOREPINEPHRINE BITARTRATE 1 MCG/KG/MIN: 1 SOLUTION INTRAVENOUS at 08:06

## 2022-01-01 RX ADMIN — NOREPINEPHRINE BITARTRATE 0.23 MCG/KG/MIN: 1 INJECTION, SOLUTION, CONCENTRATE INTRAVENOUS at 04:01

## 2022-01-01 RX ADMIN — PHENYLEPHRINE HYDROCHLORIDE 100 MCG: 10 INJECTION INTRAVENOUS at 09:03

## 2022-01-01 RX ADMIN — ACETAMINOPHEN 500 MG: 500 TABLET ORAL at 12:03

## 2022-01-01 RX ADMIN — COLLAGENASE SANTYL: 250 OINTMENT TOPICAL at 10:06

## 2022-01-01 RX ADMIN — SODIUM CHLORIDE: 9 INJECTION, SOLUTION INTRAVENOUS at 11:02

## 2022-01-01 RX ADMIN — SODIUM CHLORIDE: 9 INJECTION, SOLUTION INTRAVENOUS at 04:03

## 2022-01-01 RX ADMIN — MORPHINE SULFATE 4 MG: 4 INJECTION, SOLUTION INTRAMUSCULAR; INTRAVENOUS at 02:04

## 2022-01-01 RX ADMIN — SEVELAMER CARBONATE 0.8 G: 800 POWDER, FOR SUSPENSION ORAL at 11:05

## 2022-01-01 RX ADMIN — ACETAMINOPHEN 500 MG: 500 TABLET ORAL at 07:05

## 2022-01-01 RX ADMIN — PROPOFOL 41.9 MCG/KG/MIN: 10 INJECTION, EMULSION INTRAVENOUS at 09:01

## 2022-01-01 RX ADMIN — FENTANYL CITRATE 62.5 MCG/HR: 50 INJECTION, SOLUTION INTRAMUSCULAR; INTRAVENOUS at 01:01

## 2022-01-01 RX ADMIN — NOREPINEPHRINE BITARTRATE 0.28 MCG/KG/MIN: 1 INJECTION, SOLUTION, CONCENTRATE INTRAVENOUS at 09:01

## 2022-01-01 RX ADMIN — SILVER SULFADIAZINE: 10 CREAM TOPICAL at 12:02

## 2022-01-01 RX ADMIN — INSULIN ASPART 2 UNITS: 100 INJECTION, SOLUTION INTRAVENOUS; SUBCUTANEOUS at 01:03

## 2022-01-01 RX ADMIN — LINEZOLID 600 MG: 600 INJECTION, SOLUTION INTRAVENOUS at 05:05

## 2022-01-01 RX ADMIN — HEPARIN SODIUM 4000 UNITS: 1000 INJECTION INTRAVENOUS; SUBCUTANEOUS at 12:05

## 2022-01-01 RX ADMIN — HYDRALAZINE HYDROCHLORIDE 25 MG: 25 TABLET ORAL at 11:03

## 2022-01-01 RX ADMIN — INSULIN ASPART 6 UNITS: 100 INJECTION, SOLUTION INTRAVENOUS; SUBCUTANEOUS at 06:01

## 2022-01-01 RX ADMIN — DILTIAZEM HYDROCHLORIDE 30 MG: 30 TABLET, FILM COATED ORAL at 11:02

## 2022-01-01 RX ADMIN — SODIUM CHLORIDE 2000 ML/HR: 9 INJECTION, SOLUTION INTRAVENOUS at 01:01

## 2022-01-01 RX ADMIN — SODIUM CHLORIDE 2000 ML/HR: 9 INJECTION, SOLUTION INTRAVENOUS at 11:02

## 2022-01-01 RX ADMIN — SEVELAMER CARBONATE 0.8 G: 800 POWDER, FOR SUSPENSION ORAL at 10:02

## 2022-01-01 RX ADMIN — HEPARIN SODIUM 4000 UNITS: 1000 INJECTION INTRAVENOUS; SUBCUTANEOUS at 09:03

## 2022-01-01 RX ADMIN — ACETIC ACID: 250 IRRIGANT IRRIGATION at 05:03

## 2022-01-01 RX ADMIN — ACETAMINOPHEN 1000 MG: 500 TABLET ORAL at 04:01

## 2022-01-01 RX ADMIN — PREDNISONE 5 MG: 5 TABLET ORAL at 10:04

## 2022-01-01 RX ADMIN — HEPARIN SODIUM 4000 UNITS: 1000 INJECTION INTRAVENOUS; SUBCUTANEOUS at 01:01

## 2022-01-01 RX ADMIN — FENTANYL CITRATE 50 MCG: 50 INJECTION INTRAMUSCULAR; INTRAVENOUS at 10:04

## 2022-01-01 RX ADMIN — SODIUM CHLORIDE: 9 INJECTION, SOLUTION INTRAVENOUS at 08:03

## 2022-01-01 RX ADMIN — FENTANYL CITRATE 10 MCG/HR: 50 INJECTION, SOLUTION INTRAMUSCULAR; INTRAVENOUS at 10:01

## 2022-01-01 RX ADMIN — HYDRALAZINE HYDROCHLORIDE 25 MG: 25 TABLET ORAL at 03:03

## 2022-01-01 RX ADMIN — PANTOPRAZOLE SODIUM 40 MG: 40 GRANULE, DELAYED RELEASE ORAL at 01:05

## 2022-01-01 RX ADMIN — GUAIFENESIN 200 MG: 200 SOLUTION ORAL at 07:03

## 2022-01-01 RX ADMIN — ONDANSETRON 4 MG: 2 INJECTION INTRAMUSCULAR; INTRAVENOUS at 04:06

## 2022-01-01 RX ADMIN — INSULIN ASPART 1 UNITS: 100 INJECTION, SOLUTION INTRAVENOUS; SUBCUTANEOUS at 11:01

## 2022-01-01 RX ADMIN — SILVER SULFADIAZINE: 10 CREAM TOPICAL at 02:03

## 2022-01-01 RX ADMIN — SODIUM CHLORIDE: 9 INJECTION, SOLUTION INTRAVENOUS at 10:02

## 2022-01-01 RX ADMIN — DEXTROSE MONOHYDRATE 12.5 G: 25 INJECTION, SOLUTION INTRAVENOUS at 01:01

## 2022-01-01 RX ADMIN — DEXTROSE MONOHYDRATE 12.5 G: 25 INJECTION, SOLUTION INTRAVENOUS at 06:04

## 2022-01-01 RX ADMIN — FENTANYL CITRATE 30 MCG/HR: 50 INJECTION, SOLUTION INTRAMUSCULAR; INTRAVENOUS at 09:01

## 2022-01-01 RX ADMIN — CALCIUM GLUCONATE 1 G: 98 INJECTION, SOLUTION INTRAVENOUS at 11:01

## 2022-01-01 RX ADMIN — HUMAN INSULIN 10 UNITS: 100 INJECTION, SOLUTION SUBCUTANEOUS at 11:01

## 2022-01-01 RX ADMIN — PREDNISONE 10 MG: 10 TABLET ORAL at 10:03

## 2022-01-01 RX ADMIN — GENTAMICIN SULFATE 170 MG: 40 INJECTION, SOLUTION INTRAMUSCULAR; INTRAVENOUS at 10:01

## 2022-01-01 RX ADMIN — SILVER SULFADIAZINE: 10 CREAM TOPICAL at 01:05

## 2022-01-01 RX ADMIN — SODIUM CHLORIDE 100 ML/HR: 9 INJECTION, SOLUTION INTRAVENOUS at 05:03

## 2022-01-01 RX ADMIN — ACETAMINOPHEN 500 MG: 500 TABLET ORAL at 06:05

## 2022-01-01 RX ADMIN — PIPERACILLIN AND TAZOBACTAM 4.5 G: 4; .5 INJECTION, POWDER, LYOPHILIZED, FOR SOLUTION INTRAVENOUS at 03:05

## 2022-01-01 RX ADMIN — FENTANYL CITRATE 70 MCG/HR: 50 INJECTION, SOLUTION INTRAMUSCULAR; INTRAVENOUS at 02:01

## 2022-01-01 RX ADMIN — TICAGRELOR 90 MG: 90 TABLET ORAL at 10:03

## 2022-01-01 RX ADMIN — DILTIAZEM HYDROCHLORIDE 90 MG: 90 TABLET, FILM COATED ORAL at 02:06

## 2022-01-01 RX ADMIN — INSULIN ASPART 4 UNITS: 100 INJECTION, SOLUTION INTRAVENOUS; SUBCUTANEOUS at 12:01

## 2022-01-01 RX ADMIN — FENTANYL CITRATE 50 MCG: 50 INJECTION INTRAMUSCULAR; INTRAVENOUS at 09:04

## 2022-01-01 RX ADMIN — NOREPINEPHRINE BITARTRATE 3 MCG/KG/MIN: 1 INJECTION, SOLUTION, CONCENTRATE INTRAVENOUS at 01:01

## 2022-01-01 RX ADMIN — GENTAMICIN SULFATE 260 MG: 40 INJECTION, SOLUTION INTRAMUSCULAR; INTRAVENOUS at 03:01

## 2022-01-01 RX ADMIN — FENTANYL CITRATE 100 MCG: 50 INJECTION INTRAMUSCULAR; INTRAVENOUS at 09:05

## 2022-01-01 RX ADMIN — INSULIN ASPART 6 UNITS: 100 INJECTION, SOLUTION INTRAVENOUS; SUBCUTANEOUS at 11:03

## 2022-01-01 RX ADMIN — HEPARIN SODIUM 5000 UNITS: 5000 INJECTION INTRAVENOUS; SUBCUTANEOUS at 11:03

## 2022-01-01 RX ADMIN — AMLODIPINE BESYLATE 5 MG: 5 TABLET ORAL at 08:03

## 2022-01-01 RX ADMIN — ACETIC ACID: 250 IRRIGANT IRRIGATION at 04:03

## 2022-01-01 RX ADMIN — NOREPINEPHRINE BITARTRATE 0.8 MCG/KG/MIN: 1 SOLUTION INTRAVENOUS at 03:06

## 2022-01-01 RX ADMIN — EPOETIN ALFA-EPBX 10000 UNITS: 10000 INJECTION, SOLUTION INTRAVENOUS; SUBCUTANEOUS at 08:06

## 2022-01-01 RX ADMIN — ROCURONIUM BROMIDE 15 MG: 10 INJECTION, SOLUTION INTRAVENOUS at 10:04

## 2022-01-01 RX ADMIN — PREDNISONE 2.5 MG: 2.5 TABLET ORAL at 11:05

## 2022-01-01 RX ADMIN — ROCURONIUM BROMIDE 25 MG: 10 INJECTION, SOLUTION INTRAVENOUS at 09:01

## 2022-01-01 RX ADMIN — PROPOFOL 35.05 MCG/KG/MIN: 10 INJECTION, EMULSION INTRAVENOUS at 10:01

## 2022-01-01 RX ADMIN — SUGAMMADEX 200 MG: 100 INJECTION, SOLUTION INTRAVENOUS at 11:04

## 2022-01-01 RX ADMIN — NOREPINEPHRINE BITARTRATE 0.35 MCG/KG/MIN: 1 INJECTION, SOLUTION, CONCENTRATE INTRAVENOUS at 05:01

## 2022-01-01 RX ADMIN — SODIUM CHLORIDE: 9 INJECTION, SOLUTION INTRAVENOUS at 01:04

## 2022-01-01 RX ADMIN — INSULIN ASPART 2 UNITS: 100 INJECTION, SOLUTION INTRAVENOUS; SUBCUTANEOUS at 04:01

## 2022-01-01 RX ADMIN — ROCURONIUM BROMIDE 50 MG: 10 INJECTION, SOLUTION INTRAVENOUS at 02:01

## 2022-01-01 RX ADMIN — INSULIN DETEMIR 30 UNITS: 100 INJECTION, SOLUTION SUBCUTANEOUS at 08:03

## 2022-01-01 RX ADMIN — SEVELAMER CARBONATE 0.8 G: 800 POWDER, FOR SUSPENSION ORAL at 01:06

## 2022-01-01 RX ADMIN — GUAIFENESIN 200 MG: 200 SOLUTION ORAL at 01:06

## 2022-01-01 RX ADMIN — NOREPINEPHRINE BITARTRATE 0.23 MCG/KG/MIN: 1 INJECTION, SOLUTION, CONCENTRATE INTRAVENOUS at 11:01

## 2022-01-01 RX ADMIN — ACETAMINOPHEN 500 MG: 500 TABLET ORAL at 11:03

## 2022-01-01 RX ADMIN — PROPOFOL 36.51 MCG/KG/MIN: 10 INJECTION, EMULSION INTRAVENOUS at 08:01

## 2022-01-01 RX ADMIN — SODIUM CHLORIDE 2000 ML: 9 INJECTION, SOLUTION INTRAVENOUS at 11:04

## 2022-01-01 RX ADMIN — GUAIFENESIN 200 MG: 200 SOLUTION ORAL at 02:04

## 2022-01-01 RX ADMIN — MIDAZOLAM 2 MG: 1 INJECTION INTRAMUSCULAR; INTRAVENOUS at 09:04

## 2022-01-01 RX ADMIN — MEROPENEM 1 G: 1 INJECTION, POWDER, FOR SOLUTION INTRAVENOUS at 05:01

## 2022-01-01 RX ADMIN — DILTIAZEM HYDROCHLORIDE 90 MG: 90 TABLET, FILM COATED ORAL at 01:03

## 2022-01-01 RX ADMIN — SEVELAMER CARBONATE 0.8 G: 800 POWDER, FOR SUSPENSION ORAL at 10:03

## 2022-01-01 RX ADMIN — SEVELAMER CARBONATE 0.8 G: 800 POWDER, FOR SUSPENSION ORAL at 04:06

## 2022-01-01 RX ADMIN — SODIUM CHLORIDE: 9 INJECTION, SOLUTION INTRAVENOUS at 07:06

## 2022-01-01 RX ADMIN — PIPERACILLIN AND TAZOBACTAM 4.5 G: 4; .5 INJECTION, POWDER, FOR SOLUTION INTRAVENOUS at 04:05

## 2022-01-01 RX ADMIN — MEROPENEM 1 G: 1 INJECTION, POWDER, FOR SOLUTION INTRAVENOUS at 12:01

## 2022-01-01 RX ADMIN — SODIUM CHLORIDE 2000 ML: 9 INJECTION, SOLUTION INTRAVENOUS at 02:04

## 2022-01-01 RX ADMIN — POTASSIUM IODIDE 5 DROP: 1 SOLUTION ORAL at 06:06

## 2022-01-01 RX ADMIN — ONDANSETRON 4 MG: 2 INJECTION INTRAMUSCULAR; INTRAVENOUS at 01:06

## 2022-01-01 RX ADMIN — NOREPINEPHRINE BITARTRATE 0.8 MCG/KG/MIN: 1 SOLUTION INTRAVENOUS at 05:06

## 2022-01-01 RX ADMIN — TICAGRELOR 90 MG: 90 TABLET ORAL at 12:01

## 2022-01-01 RX ADMIN — SODIUM CHLORIDE 2000 ML/HR: 9 INJECTION, SOLUTION INTRAVENOUS at 04:01

## 2022-01-01 RX ADMIN — NOREPINEPHRINE BITARTRATE 1 MCG/KG/MIN: 1 SOLUTION INTRAVENOUS at 01:06

## 2022-01-01 RX ADMIN — MORPHINE SULFATE 4 MG: 4 INJECTION, SOLUTION INTRAMUSCULAR; INTRAVENOUS at 12:04

## 2022-01-01 RX ADMIN — HEPARIN SODIUM 5000 UNITS: 5000 INJECTION INTRAVENOUS; SUBCUTANEOUS at 04:05

## 2022-01-01 RX ADMIN — ALBUTEROL SULFATE 2.5 MG: 2.5 SOLUTION RESPIRATORY (INHALATION) at 11:06

## 2022-01-01 RX ADMIN — SODIUM CHLORIDE: 9 INJECTION, SOLUTION INTRAVENOUS at 08:01

## 2022-01-01 RX ADMIN — INSULIN DETEMIR 25 UNITS: 100 INJECTION, SOLUTION SUBCUTANEOUS at 10:05

## 2022-01-01 RX ADMIN — MEROPENEM 500 MG: 500 INJECTION, POWDER, FOR SOLUTION INTRAVENOUS at 01:01

## 2022-01-01 RX ADMIN — PROPOFOL 35 MCG/KG/MIN: 10 INJECTION, EMULSION INTRAVENOUS at 05:01

## 2022-01-01 RX ADMIN — INSULIN DETEMIR 20 UNITS: 100 INJECTION, SOLUTION SUBCUTANEOUS at 09:03

## 2022-01-01 RX ADMIN — DILTIAZEM HYDROCHLORIDE 90 MG: 90 TABLET, FILM COATED ORAL at 02:04

## 2022-01-01 RX ADMIN — INSULIN ASPART 4 UNITS: 100 INJECTION, SOLUTION INTRAVENOUS; SUBCUTANEOUS at 07:04

## 2022-01-01 RX ADMIN — PANTOPRAZOLE SODIUM 40 MG: 40 GRANULE, DELAYED RELEASE ORAL at 11:05

## 2022-01-01 RX ADMIN — PANTOPRAZOLE SODIUM 40 MG: 40 GRANULE, DELAYED RELEASE ORAL at 11:04

## 2022-01-01 RX ADMIN — SODIUM CHLORIDE: 9 INJECTION, SOLUTION INTRAVENOUS at 10:03

## 2022-01-01 RX ADMIN — SODIUM CHLORIDE 250 ML: 9 INJECTION, SOLUTION INTRAVENOUS at 02:06

## 2022-01-01 RX ADMIN — IPRATROPIUM BROMIDE AND ALBUTEROL SULFATE 3 ML: 2.5; .5 SOLUTION RESPIRATORY (INHALATION) at 06:06

## 2022-01-01 RX ADMIN — SODIUM CHLORIDE: 9 INJECTION, SOLUTION INTRAVENOUS at 08:06

## 2022-01-01 RX ADMIN — MEROPENEM 500 MG: 500 INJECTION, POWDER, FOR SOLUTION INTRAVENOUS at 07:06

## 2022-01-01 RX ADMIN — ACETAMINOPHEN 500 MG: 500 TABLET ORAL at 09:06

## 2022-01-01 RX ADMIN — NOREPINEPHRINE BITARTRATE 1 MCG/KG/MIN: 1 SOLUTION INTRAVENOUS at 05:06

## 2022-01-01 RX ADMIN — FENTANYL 1 PATCH: 25 PATCH, EXTENDED RELEASE TRANSDERMAL at 08:03

## 2022-01-01 RX ADMIN — SODIUM CHLORIDE 4000 ML: 9 INJECTION, SOLUTION INTRAVENOUS at 04:03

## 2022-01-01 RX ADMIN — I.V. FAT EMULSION 250 ML: 20 EMULSION INTRAVENOUS at 10:01

## 2022-01-01 RX ADMIN — PREDNISONE 2.5 MG: 2.5 TABLET ORAL at 01:05

## 2022-01-01 RX ADMIN — MEROPENEM 1 G: 1 INJECTION, POWDER, FOR SOLUTION INTRAVENOUS at 07:01

## 2022-01-01 RX ADMIN — MORPHINE SULFATE 4 MG: 4 INJECTION, SOLUTION INTRAMUSCULAR; INTRAVENOUS at 11:04

## 2022-01-01 RX ADMIN — LINEZOLID 600 MG: 600 INJECTION, SOLUTION INTRAVENOUS at 01:04

## 2022-01-01 RX ADMIN — POTASSIUM IODIDE 5 DROP: 1 SOLUTION ORAL at 02:06

## 2022-01-01 RX ADMIN — SODIUM CHLORIDE 100 ML/HR: 9 INJECTION, SOLUTION INTRAVENOUS at 03:03

## 2022-01-01 RX ADMIN — PROPOFOL 18.25 MCG/KG/MIN: 10 INJECTION, EMULSION INTRAVENOUS at 12:01

## 2022-01-01 RX ADMIN — FENTANYL CITRATE 63 MCG/HR: 50 INJECTION, SOLUTION INTRAMUSCULAR; INTRAVENOUS at 05:01

## 2022-01-01 RX ADMIN — FENTANYL 1 PATCH: 50 PATCH, EXTENDED RELEASE TRANSDERMAL at 04:02

## 2022-01-01 RX ADMIN — PREDNISONE 10 MG: 10 TABLET ORAL at 08:04

## 2022-01-01 RX ADMIN — METHYLPREDNISOLONE SODIUM SUCCINATE 20 MG: 40 INJECTION, POWDER, FOR SOLUTION INTRAMUSCULAR; INTRAVENOUS at 04:03

## 2022-01-01 RX ADMIN — FENTANYL 1 PATCH: 25 PATCH, EXTENDED RELEASE TRANSDERMAL at 06:03

## 2022-01-01 RX ADMIN — ACETAMINOPHEN 500 MG: 500 TABLET ORAL at 03:06

## 2022-01-01 RX ADMIN — HYDRALAZINE HYDROCHLORIDE 25 MG: 25 TABLET ORAL at 10:04

## 2022-01-01 RX ADMIN — NOREPINEPHRINE BITARTRATE 1.2 MCG/KG/MIN: 1 SOLUTION INTRAVENOUS at 06:06

## 2022-01-01 RX ADMIN — METOPROLOL TARTRATE 5 MG: 5 INJECTION INTRAVENOUS at 04:04

## 2022-01-01 RX ADMIN — INSULIN ASPART 2 UNITS: 100 INJECTION, SOLUTION INTRAVENOUS; SUBCUTANEOUS at 12:05

## 2022-01-01 RX ADMIN — NOREPINEPHRINE BITARTRATE 0.17 MCG/KG/MIN: 1 INJECTION, SOLUTION, CONCENTRATE INTRAVENOUS at 12:01

## 2022-01-01 RX ADMIN — METOPROLOL TARTRATE 5 MG: 5 INJECTION INTRAVENOUS at 03:01

## 2022-01-01 RX ADMIN — METOPROLOL TARTRATE 12.5 MG: 25 TABLET, FILM COATED ORAL at 01:05

## 2022-01-01 RX ADMIN — HEPARIN SODIUM 4000 UNITS: 1000 INJECTION INTRAVENOUS; SUBCUTANEOUS at 10:03

## 2022-01-01 RX ADMIN — PIPERACILLIN AND TAZOBACTAM 4.5 G: 4; .5 INJECTION, POWDER, LYOPHILIZED, FOR SOLUTION INTRAVENOUS at 12:04

## 2022-01-01 RX ADMIN — SODIUM CHLORIDE: 9 INJECTION, SOLUTION INTRAVENOUS at 09:04

## 2022-01-01 RX ADMIN — HEPARIN SODIUM 4000 UNITS: 1000 INJECTION INTRAVENOUS; SUBCUTANEOUS at 06:06

## 2022-01-01 RX ADMIN — SODIUM CHLORIDE 1000 ML: 9 INJECTION, SOLUTION INTRAVENOUS at 05:02

## 2022-01-01 RX ADMIN — FENTANYL CITRATE 30 MCG/HR: 50 INJECTION, SOLUTION INTRAMUSCULAR; INTRAVENOUS at 11:01

## 2022-01-01 RX ADMIN — HEPARIN SODIUM 4000 UNITS: 1000 INJECTION INTRAVENOUS; SUBCUTANEOUS at 03:05

## 2022-01-01 RX ADMIN — SODIUM CHLORIDE: 9 INJECTION, SOLUTION INTRAVENOUS at 08:05

## 2022-01-01 RX ADMIN — CEFAZOLIN 2 G: 1 INJECTION, POWDER, FOR SOLUTION INTRAMUSCULAR; INTRAVENOUS; PARENTERAL at 09:05

## 2022-01-01 NOTE — PROGRESS NOTES
Patient is chronically ill-appearing he remains intubated and sedate.    Physical exam general patient is chronically ill-appearing, heart is regular rate and rhythm, he has trace pretibial edema, lungs reveal coarse breath sounds with upper airway noises, abdomen is soft with decreased bowel sounds    Assessment/plan 1. Denice's gangrene-continue antibiotics  2. Sepsis  3. Respiratory failure-continue vent support  4. Acute renal failure- will hold dialysis today due to instability. Pt. Dialyzed yesterday.  5. Anemia-patient's hematocrit is 23%   6. Hyperkalemia- k 5.3, give lokelma or kayexalate for value of 6 or greater.  7. Met acidosis - monitor

## 2022-01-01 NOTE — PLAN OF CARE
Problem: Adult Inpatient Plan of Care  Goal: Plan of Care Review  Outcome: Ongoing, Progressing     Problem: Bleeding (Sepsis/Septic Shock)  Goal: Absence of Bleeding  Outcome: Ongoing, Progressing     Problem: Infection Progression (Sepsis/Septic Shock)  Goal: Absence of Infection Signs and Symptoms  Outcome: Ongoing, Progressing

## 2022-01-01 NOTE — ANESTHESIA PREPROCEDURE EVALUATION
12/31/2021  Og Garcia is a 66 y.o., male.    Anesthesia Evaluation    I have reviewed the Patient Summary Reports.   I have reviewed the NPO Status.   I have reviewed the Medications.     Review of Systems         Anesthesia Plan  Type of Anesthesia, risks & benefits discussed:  Anesthesia Type:  general    Patient's Preference:   Plan Factors:          Intra-op Monitoring Plan: standard ASA monitors  Intra-op Monitoring Plan Comments:   Post Op Pain Control Plan: IV/PO Opioids PRN  Post Op Pain Control Plan Comments:     Induction:   IV  Beta Blocker:         Informed Consent: Patient representative understands risks and agrees with Anesthesia plan.  Questions answered. Anesthesia consent signed with patient representative.  ASA Score: 4     Day of Surgery Review of History & Physical:            Ready For Surgery From Anesthesia Perspective.     NPO greater than 8 hours  NAC  NKDA    Hct 21  K 6.3  Cr 6.4  Ca 6.8  12/17/21 EKG: Sinus rhythm 94 bpm;   RBBB with left anterior fascicular block   Inferior infarct - age undetermined  12/15/21 Echo: EF 40%    HTN  Diabetes mellitus  H/o CVA  Respiratory failure . . . Intubated/ventilated  Denice's gangrene . . . S/p multiple debridements  RAHAT . . . Pt being dialyzed in the hospital  Critical anemia . . . Pt transfused pRBC's today  Hypocalcemia    Airway exam deferred (pt already intubated)

## 2022-01-01 NOTE — PROGRESS NOTES
[facility]  Pulmonology  Progress Note    Patient Name: Og Garcia  MRN: [id number]  Admission Date: 12/23/2021  Hospital Length of Stay: 9 days  Code Status: Prior  Attending Provider: Cecil Abernathy DO  Primary Care Provider: Hector Arndt DNP, FNP-C   Principal Problem: Denice gangrene    Subjective:     Interval History: intubated, sedated, s/p debridement today     Objective:     Vital Signs (Most Recent):  Temp: 98.8 °F (37.1 °C) (01/01/22 1215)  Pulse: 94 (01/01/22 0800)  Resp: (!) 33 (01/01/22 0800)  BP: (!) 150/70 (01/01/22 0800)  SpO2: 100 % (01/01/22 1155) Vital Signs (24h Range):  Temp:  [98.4 °F (36.9 °C)-99.6 °F (37.6 °C)] 98.8 °F (37.1 °C)  Pulse:  [] 103  Resp:  [19-40] 26  SpO2:  [97 %-100 %] 100 %  BP: ()/(48-86) 131/74     Weight: 95 kg (209 lb 7 oz)  Body mass index is 29.21 kg/m².      Intake/Output Summary (Last 24 hours) at 1/1/2022 1324  Last data filed at 1/1/2022 1026  Gross per 24 hour   Intake 798 ml   Output 400 ml   Net 398 ml       Physical Exam  Vitals reviewed.   Constitutional:       Appearance: He is ill-appearing.      Comments: Intubated and sedated   HENT:      Head:      Comments: Right temporal deformity     Nose: Nose normal.      Mouth/Throat:      Mouth: Mucous membranes are moist.      Comments: Trach   Eyes:      Conjunctiva/sclera: Conjunctivae normal.      Pupils: Pupils are equal, round, and reactive to light.   Cardiovascular:      Rate and Rhythm: Normal rate and regular rhythm.      Pulses: Normal pulses.      Heart sounds: Normal heart sounds.   Pulmonary:      Effort: No respiratory distress.      Breath sounds: No stridor. No wheezing, rhonchi or rales.      Comments: Ventilator driven breath sounds bilaterally  Abdominal:      Tenderness: There is no guarding.      Comments: Anterior abdominal wall with wound vac in place. Ostomy   Genitourinary:     Comments: Packing in place  Musculoskeletal:         General:  No swelling or deformity.      Cervical back: Neck supple.      Right lower leg: No edema.      Left lower leg: No edema.   Lymphadenopathy:      Cervical: No cervical adenopathy.   Skin:     General: Skin is warm and dry.   Neurological:      Cranial Nerves: No cranial nerve deficit.      Comments: Intubated and sedated         Vents:  Vent Mode: A/C (01/01/22 1155)  Set Rate: 16 BPM (01/01/22 1155)  Vt Set: 480 mL (01/01/22 1155)  Pressure Support: 12 cmH20 (12/29/21 0036)  PEEP/CPAP: 5 cmH20 (01/01/22 1155)  Oxygen Concentration (%): 50 (01/01/22 1215)  Peak Airway Pressure: 44 cmH2O (01/01/22 1155)  Total Ve: 15.6 mL (01/01/22 1155)  F/VT Ratio<105 (RSBI): (!) 82.23 (12/31/21 0830)    Lines/Drains/Airways     Central Venous Catheter Line                 Hemodialysis Catheter 12/30/21 0900 right internal jugular 2 days          Drain                 NG/OG Tube [place] sump 18 Fr. Left nostril -- days         Urethral Catheter 12/13/21 2130 16 Fr. 18 days         Colostomy 12/18/21 1030 Descending/sigmoid LUQ 14 days          Airway                 Airway - Non-Surgical 12/14/21 1047 18 days          Peripheral Intravenous Line                 Peripheral IV - Single Lumen 12/19/21 2126 20 G Anterior;Right Upper Arm 12 days         Peripheral IV - Single Lumen 12/29/21 1532 20 G Anterior;Left Forearm 2 days                Significant Labs:    CBC/Anemia Profile:  Recent Labs   Lab 12/31/21  0824 12/31/21  1542   WBC 17.07*  --    HGB 5.5* 7.3*   HCT 16.0* 20.6*     --    MCV 85.6  --    RDW 17.2*  --         Chemistries:  Recent Labs   Lab 12/31/21  0824 01/01/22  0654   * 132*   K 6.3* 5.3*   CL 96* 98   CO2 14* 15*   * 80*   CREATININE 6.36* 5.14*   CALCIUM 6.8* 7.0*       All pertinent labs within the past 24 hours have been reviewed.    Significant Imaging:  I have reviewed all pertinent imaging results/findings within the past 24 hours.    Assessment/Plan:     * Denice gangrene  - s/p  multiple debridements  - wound vac in place  - ID consulted for antibiotic management: He was treated with rocephin, daptomycin, clindamycin. 12/21- change clindamycin to ampicillin and treat for another week  - 12/28 wound vac changed at bedside   1/1/22 -  To OR today      Type 2 diabetes mellitus without complication, without long-term current use of insulin  - continue basal bolus  - fasting 110    RAHAT (acute kidney injury)  - nephrology consulted   - no sign of renal recovery.  Continuing with scheduled hemodialysis for this patient.  - tunneled HD line placement 12/30  - 12/31 - potassium 6.36 - plan for HD again today  with 2 PRBC      On mechanically assisted ventilation  - 12/14 intubated   - not doing well with PSV trials. Placed back on AC  - > 15 days intubated,  consider trach placement. Holding ticagrelor for possible trach on Monday 1/1/22- plan for trach Monday                FLORESITA Shafer-ACNP  Pulmonology  [facility]

## 2022-01-01 NOTE — SUBJECTIVE & OBJECTIVE
Interval History: intubated, sedated, s/p debridement today     Objective:     Vital Signs (Most Recent):  Temp: 98.8 °F (37.1 °C) (01/01/22 1215)  Pulse: 94 (01/01/22 0800)  Resp: (!) 33 (01/01/22 0800)  BP: (!) 150/70 (01/01/22 0800)  SpO2: 100 % (01/01/22 1155) Vital Signs (24h Range):  Temp:  [98.4 °F (36.9 °C)-99.6 °F (37.6 °C)] 98.8 °F (37.1 °C)  Pulse:  [] 103  Resp:  [19-40] 26  SpO2:  [97 %-100 %] 100 %  BP: ()/(48-86) 131/74     Weight: 95 kg (209 lb 7 oz)  Body mass index is 29.21 kg/m².      Intake/Output Summary (Last 24 hours) at 1/1/2022 1324  Last data filed at 1/1/2022 1026  Gross per 24 hour   Intake 798 ml   Output 400 ml   Net 398 ml       Physical Exam  Vitals reviewed.   Constitutional:       Appearance: He is ill-appearing.      Comments: Intubated and sedated   HENT:      Head:      Comments: Right temporal deformity     Nose: Nose normal.      Mouth/Throat:      Mouth: Mucous membranes are moist.      Comments: Trach   Eyes:      Conjunctiva/sclera: Conjunctivae normal.      Pupils: Pupils are equal, round, and reactive to light.   Cardiovascular:      Rate and Rhythm: Normal rate and regular rhythm.      Pulses: Normal pulses.      Heart sounds: Normal heart sounds.   Pulmonary:      Effort: No respiratory distress.      Breath sounds: No stridor. No wheezing, rhonchi or rales.      Comments: Ventilator driven breath sounds bilaterally  Abdominal:      Tenderness: There is no guarding.      Comments: Anterior abdominal wall with wound vac in place. Ostomy   Genitourinary:     Comments: Packing in place  Musculoskeletal:         General: No swelling or deformity.      Cervical back: Neck supple.      Right lower leg: No edema.      Left lower leg: No edema.   Lymphadenopathy:      Cervical: No cervical adenopathy.   Skin:     General: Skin is warm and dry.   Neurological:      Cranial Nerves: No cranial nerve deficit.      Comments: Intubated and sedated         Vents:  Vent  Mode: A/C (01/01/22 1155)  Set Rate: 16 BPM (01/01/22 1155)  Vt Set: 480 mL (01/01/22 1155)  Pressure Support: 12 cmH20 (12/29/21 0036)  PEEP/CPAP: 5 cmH20 (01/01/22 1155)  Oxygen Concentration (%): 50 (01/01/22 1215)  Peak Airway Pressure: 44 cmH2O (01/01/22 1155)  Total Ve: 15.6 mL (01/01/22 1155)  F/VT Ratio<105 (RSBI): (!) 82.23 (12/31/21 0830)    Lines/Drains/Airways     Central Venous Catheter Line                 Hemodialysis Catheter 12/30/21 0900 right internal jugular 2 days          Drain                 NG/OG Tube [place] sump 18 Fr. Left nostril -- days         Urethral Catheter 12/13/21 2130 16 Fr. 18 days         Colostomy 12/18/21 1030 Descending/sigmoid LUQ 14 days          Airway                 Airway - Non-Surgical 12/14/21 1047 18 days          Peripheral Intravenous Line                 Peripheral IV - Single Lumen 12/19/21 2126 20 G Anterior;Right Upper Arm 12 days         Peripheral IV - Single Lumen 12/29/21 1532 20 G Anterior;Left Forearm 2 days                Significant Labs:    CBC/Anemia Profile:  Recent Labs   Lab 12/31/21  0824 12/31/21  1542   WBC 17.07*  --    HGB 5.5* 7.3*   HCT 16.0* 20.6*     --    MCV 85.6  --    RDW 17.2*  --         Chemistries:  Recent Labs   Lab 12/31/21  0824 01/01/22  0654   * 132*   K 6.3* 5.3*   CL 96* 98   CO2 14* 15*   * 80*   CREATININE 6.36* 5.14*   CALCIUM 6.8* 7.0*       All pertinent labs within the past 24 hours have been reviewed.    Significant Imaging:  I have reviewed all pertinent imaging results/findings within the past 24 hours.

## 2022-01-01 NOTE — TRANSFER OF CARE
Anesthesia Transfer of Care Note    Patient: [facility]    Procedure(s) Performed: Procedure(s) (LRB):  LAPAROTOMY, EXPLORATORY (N/A)    Patient location: Other:     Anesthesia Type: general    Transport from OR: Transported from OR intubated on 100% O2 by AMBU with assisted ventilation. Continuous ECG monitoring in transport. Continuous SpO2 monitoring in transport    Post pain: adequate analgesia    Post assessment: no apparent anesthetic complications    Post vital signs: stable    Level of consciousness: unresponsive    Nausea/Vomiting: no nausea/vomiting    Complications: none    Transfer of care protocol was followed      Last vitals:   Visit Vitals  BP (!) 148/65 (BP Location: Left arm, Patient Position: Lying)   Pulse 94   Temp 36.7 °C (98.1 °F)   Resp (!) 33   SpO2 99%

## 2022-01-01 NOTE — PLAN OF CARE
Problem: Adult Inpatient Plan of Care  Goal: Plan of Care Review  Outcome: Ongoing, Progressing     Problem: Glycemic Control Impaired (Sepsis/Septic Shock)  Goal: Blood Glucose Level Within Desired Range  Outcome: Ongoing, Progressing     Problem: Infection Progression (Sepsis/Septic Shock)  Goal: Absence of Infection Signs and Symptoms  Outcome: Ongoing, Progressing

## 2022-01-01 NOTE — ASSESSMENT & PLAN NOTE
- 12/14 intubated   - not doing well with PSV trials. Placed back on AC  - > 15 days intubated,  consider trach placement. Holding ticagrelor for possible trach on Monday 1/1/22- plan for trach Monday

## 2022-01-01 NOTE — ASSESSMENT & PLAN NOTE
- s/p multiple debridements  - wound vac in place  - ID consulted for antibiotic management: He was treated with rocephin, daptomycin, clindamycin. 12/21- change clindamycin to ampicillin and treat for another week  - 12/28 wound vac changed at bedside   1/1/22 -  To OR today

## 2022-01-01 NOTE — PROGRESS NOTES
To the OR for abdominal wall debridement/possible exploratory laparotomy.    Risks and benefits explained with the patient including risks for infection, bleeding, injury to surrounding structures, hematoma/seroma formation with need for possible evacuation, possible open.  The patient verbalized understanding, agrees and wishes to proceed with surgery.

## 2022-01-01 NOTE — ANESTHESIA POSTPROCEDURE EVALUATION
Anesthesia Post Evaluation    Patient: [facility]    Procedure(s) Performed: Procedure(s) (LRB):  LAPAROTOMY, EXPLORATORY (N/A)    Final Anesthesia Type: general      Patient location during evaluation: PACU  Post-procedure vital signs: reviewed and stable  Pain management: adequate  Airway patency: patent  NAMITA mitigation strategies: Extubation and recovery carried out in lateral, semiupright, or other nonsupine position  PONV status at discharge: No PONV  Anesthetic complications: no      Cardiovascular status: hemodynamically stable  Respiratory status: unassisted  Hydration status: euvolemic  Follow-up not needed.          Vitals Value Taken Time   /69 01/01/22 1715   Temp 37.1 °C (98.8 °F) 01/01/22 1530   Pulse 97 01/01/22 1715   Resp 38 01/01/22 1715   SpO2 100 % 01/01/22 1715         Event Time   Out of Recovery 10:50:00         Pain/Rio Score: Pain Rating Post Med Admin: 0 (12/31/2021  8:10 AM)  Rio Score: 7 (1/1/2022 10:20 AM)

## 2022-01-02 NOTE — PROGRESS NOTES
[facility]  General Surgery  Progress Note    Subjective:     History of Present Illness:  No notes on file    Post-Op Info:  * No surgery found *         Interval History:  No acute changes overnight    Medications:  Continuous Infusions:  Scheduled Meds:   ampicillin-sulbactim (UNASYN) IVPB  1.5 g Intravenous Q12H    insulin detemir U-100  45 Units Subcutaneous QHS    pantoprazole  40 mg Per NG tube Daily     PRN Meds:sodium chloride, sodium chloride 0.9%, acetaminophen, dextrose 50%, fentanyl, glucagon (human recombinant), heparin (porcine), insulin aspart U-100, NORepinephrine bitartrate-D5W, propofoL, sodium chloride 0.9%     Review of patient's allergies indicates:  No Known Allergies  Objective:     Vital Signs (Most Recent):  Temp: 99.8 °F (37.7 °C) (01/02/22 0400)  Pulse: (!) 112 (01/02/22 0400)  Resp: (!) 38 (01/02/22 0400)  BP: (!) 148/75 (01/02/22 0400)  SpO2: 100 % (01/02/22 0421) Vital Signs (24h Range):  Temp:  [98.4 °F (36.9 °C)-99.8 °F (37.7 °C)] 99.8 °F (37.7 °C)  Pulse:  [] 112  Resp:  [19-44] 38  SpO2:  [97 %-100 %] 100 %  BP: (102-148)/(57-86) 148/75     Weight: 95 kg (209 lb 7 oz)  Body mass index is 29.21 kg/m².    Intake/Output - Last 3 Shifts       12/31 0700  01/01 0659 01/01 0700  01/02 0659 01/02 0700  01/03 0659    I.V. (mL/kg)       Blood 1400 638     NG/ 510     IV Piggyback  100     Total Intake(mL/kg) 1645 (17.3) 1248 (13.1)     Urine (mL/kg/hr)       Other 1007      Stool 400      Total Output 1407      Net +238 +1248                  Physical Exam  Constitutional:       Appearance: He is ill-appearing.   HENT:      Head: Normocephalic.   Cardiovascular:      Rate and Rhythm: Tachycardia present.   Pulmonary:      Effort: Pulmonary effort is normal. No respiratory distress.      Comments: Per ventilator  Abdominal:      Comments: Abdominal wound VAC in place with some sanguinous drainage.  Left colostomy with pink stoma and stool in place    Musculoskeletal:         General: Normal range of motion.      Comments: Dressing in place to right buttock   Skin:     General: Skin is warm.      Coloration: Skin is not jaundiced.         Significant Labs:  I have reviewed all pertinent lab results within the past 24 hours.  CBC:   Recent Labs   Lab 01/01/22  1547   WBC 13.44*   RBC 3.26*   HGB 9.7*   HCT 27.5*      MCV 84.4   MCH 29.8   MCHC 35.3     BMP:   Recent Labs   Lab 01/01/22  0654   *   *   K 5.3*   CL 98   CO2 15*   BUN 80*   CREATININE 5.14*   CALCIUM 7.0*       Significant Diagnostics:  I have reviewed all pertinent imaging results/findings within the past 24 hours.    Assessment/Plan:     * Denice gangrene  Will take back to the OR tomorrow for exploratory laparotomy and debridement.    Hemoglobin 5.5; patient being transfused 2 units of packed red blood cells currently during dialysis    1/2:  Abdominal wound VAC in place, dressings in place.  Hold tube feeds at midnight.  Patient to OR for debridement and tracheostomy placement per Dr. Sage Mackay, DO  General Surgery  [facility]

## 2022-01-02 NOTE — SUBJECTIVE & OBJECTIVE
Interval History:  No acute changes overnight    Medications:  Continuous Infusions:  Scheduled Meds:   ampicillin-sulbactim (UNASYN) IVPB  1.5 g Intravenous Q12H    insulin detemir U-100  45 Units Subcutaneous QHS    pantoprazole  40 mg Per NG tube Daily     PRN Meds:sodium chloride, sodium chloride 0.9%, acetaminophen, dextrose 50%, fentanyl, glucagon (human recombinant), heparin (porcine), insulin aspart U-100, NORepinephrine bitartrate-D5W, propofoL, sodium chloride 0.9%     Review of patient's allergies indicates:  No Known Allergies  Objective:     Vital Signs (Most Recent):  Temp: 99.8 °F (37.7 °C) (01/02/22 0400)  Pulse: (!) 112 (01/02/22 0400)  Resp: (!) 38 (01/02/22 0400)  BP: (!) 148/75 (01/02/22 0400)  SpO2: 100 % (01/02/22 0421) Vital Signs (24h Range):  Temp:  [98.4 °F (36.9 °C)-99.8 °F (37.7 °C)] 99.8 °F (37.7 °C)  Pulse:  [] 112  Resp:  [19-44] 38  SpO2:  [97 %-100 %] 100 %  BP: (102-148)/(57-86) 148/75     Weight: 95 kg (209 lb 7 oz)  Body mass index is 29.21 kg/m².    Intake/Output - Last 3 Shifts       12/31 0700  01/01 0659 01/01 0700  01/02 0659 01/02 0700  01/03 0659    I.V. (mL/kg)       Blood 1400 638     NG/ 510     IV Piggyback  100     Total Intake(mL/kg) 1645 (17.3) 1248 (13.1)     Urine (mL/kg/hr)       Other 1007      Stool 400      Total Output 1407      Net +238 +1248                  Physical Exam  Constitutional:       Appearance: He is ill-appearing.   HENT:      Head: Normocephalic.   Cardiovascular:      Rate and Rhythm: Tachycardia present.   Pulmonary:      Effort: Pulmonary effort is normal. No respiratory distress.      Comments: Per ventilator  Abdominal:      Comments: Abdominal wound VAC in place with some sanguinous drainage.  Left colostomy with pink stoma and stool in place   Musculoskeletal:         General: Normal range of motion.      Comments: Dressing in place to right buttock   Skin:     General: Skin is warm.      Coloration: Skin is not jaundiced.          Significant Labs:  I have reviewed all pertinent lab results within the past 24 hours.  CBC:   Recent Labs   Lab 01/01/22  1547   WBC 13.44*   RBC 3.26*   HGB 9.7*   HCT 27.5*      MCV 84.4   MCH 29.8   MCHC 35.3     BMP:   Recent Labs   Lab 01/01/22  0654   *   *   K 5.3*   CL 98   CO2 15*   BUN 80*   CREATININE 5.14*   CALCIUM 7.0*       Significant Diagnostics:  I have reviewed all pertinent imaging results/findings within the past 24 hours.

## 2022-01-02 NOTE — ASSESSMENT & PLAN NOTE
- nephrology consulted   - no sign of renal recovery.  Continuing with scheduled hemodialysis for this patient.  - tunneled HD line placement 12/30  - 12/31 - potassium 6.36 - plan for HD again today  with 2 PRBC  1/2- continue HD per schedule, labs in AM

## 2022-01-02 NOTE — SUBJECTIVE & OBJECTIVE
Interval History: intubated and sedated     Objective:     Vital Signs (Most Recent):  Temp: (!) 101.3 °F (38.5 °C) (01/02/22 1140)  Pulse: (!) 112 (01/02/22 1000)  Resp: 18 (01/02/22 1000)  BP: (!) 104/58 (01/02/22 1000)  SpO2: 100 % (01/02/22 1000) Vital Signs (24h Range):  Temp:  [98.8 °F (37.1 °C)-101.3 °F (38.5 °C)] 101.3 °F (38.5 °C)  Pulse:  [] 112  Resp:  [18-44] 18  SpO2:  [100 %] 100 %  BP: (102-148)/(57-75) 104/58     Weight: 95 kg (209 lb 7 oz)  Body mass index is 29.21 kg/m².      Intake/Output Summary (Last 24 hours) at 1/2/2022 1217  Last data filed at 1/2/2022 1013  Gross per 24 hour   Intake 610 ml   Output 300 ml   Net 310 ml       Physical Exam  Vitals reviewed.   Constitutional:       Appearance: He is ill-appearing.      Comments: Intubated and sedated   HENT:      Head:      Comments: Right temporal deformity     Nose: Nose normal.      Mouth/Throat:      Mouth: Mucous membranes are moist.   Eyes:      Conjunctiva/sclera: Conjunctivae normal.      Pupils: Pupils are equal, round, and reactive to light.   Cardiovascular:      Rate and Rhythm: Normal rate and regular rhythm.      Pulses: Normal pulses.      Heart sounds: Normal heart sounds.   Pulmonary:      Effort: No respiratory distress.      Breath sounds: No stridor. No wheezing, rhonchi or rales.      Comments: Ventilator driven breath sounds bilaterally  Abdominal:      Tenderness: There is no guarding.      Comments: Anterior abdominal wall with wound vac in place. Ostomy   Musculoskeletal:         General: No swelling or deformity.      Cervical back: Neck supple.      Right lower leg: No edema.      Left lower leg: No edema.   Lymphadenopathy:      Cervical: No cervical adenopathy.   Skin:     General: Skin is warm and dry.   Neurological:      Cranial Nerves: No cranial nerve deficit.      Comments: Intubated and sedated         Vents:  Vent Mode: A/C (01/02/22 0835)  Set Rate: 16 BPM (01/02/22 0835)  Vt Set: 480 mL (01/02/22  0835)  Pressure Support: 12 cmH20 (12/29/21 0036)  PEEP/CPAP: 5 cmH20 (01/02/22 0835)  Oxygen Concentration (%): 50 (01/02/22 0421)  Peak Airway Pressure: 12 cmH2O (01/01/22 2018)  Total Ve: 14.2 mL (01/02/22 0835)  F/VT Ratio<105 (RSBI): (!) 82.23 (12/31/21 0830)    Lines/Drains/Airways     Central Venous Catheter Line                 Hemodialysis Catheter 12/30/21 0900 right internal jugular 3 days          Drain                 NG/OG Tube [place] sump 18 Fr. Left nostril -- days         Urethral Catheter 12/13/21 2130 16 Fr. 19 days         Colostomy 12/18/21 1030 Descending/sigmoid LUQ 15 days          Airway                 Airway - Non-Surgical 12/14/21 1047 19 days          Peripheral Intravenous Line                 Peripheral IV - Single Lumen 12/19/21 2126 20 G Anterior;Right Upper Arm 13 days         Peripheral IV - Single Lumen 12/29/21 1532 20 G Anterior;Left Forearm 3 days                Significant Labs:    CBC/Anemia Profile:  Recent Labs   Lab 12/31/21  1542 01/01/22  1547   WBC  --  13.44*   HGB 7.3* 9.7*   HCT 20.6* 27.5*   PLT  --  247   MCV  --  84.4   RDW  --  16.0*        Chemistries:  Recent Labs   Lab 01/01/22  0654   *   K 5.3*   CL 98   CO2 15*   BUN 80*   CREATININE 5.14*   CALCIUM 7.0*       All pertinent labs within the past 24 hours have been reviewed.    Significant Imaging:  I have reviewed all pertinent imaging results/findings within the past 24 hours.

## 2022-01-02 NOTE — ASSESSMENT & PLAN NOTE
- s/p multiple debridements  - wound vac in place  - ID consulted for antibiotic management: He was treated with rocephin, daptomycin, clindamycin. 12/21- change clindamycin to ampicillin and treat for another week  - 12/28 wound vac changed at bedside   1/1/22 -  To again today  OR today  1/2-- wound vac in place

## 2022-01-02 NOTE — PLAN OF CARE
Problem: Adult Inpatient Plan of Care  Goal: Plan of Care Review  Outcome: Ongoing, Progressing     Problem: Adult Inpatient Plan of Care  Goal: Patient-Specific Goal (Individualized)  Outcome: Ongoing, Progressing     Problem: Adult Inpatient Plan of Care  Goal: Absence of Hospital-Acquired Illness or Injury  Outcome: Ongoing, Progressing     Problem: Adult Inpatient Plan of Care  Goal: Optimal Comfort and Wellbeing  Outcome: Ongoing, Progressing     Problem: Adjustment to Illness (Sepsis/Septic Shock)  Goal: Optimal Coping  Outcome: Ongoing, Progressing

## 2022-01-02 NOTE — ASSESSMENT & PLAN NOTE
- H/H down to 5.5/16 - pt had bleeding from his HD insertion site yesterday this has now resolved.   - will transfuse 2 units PRBCs on HD  1/2- 9.7/27.5 - repeat CBC in AM

## 2022-01-02 NOTE — PROGRESS NOTES
[facility]  Pulmonology  Progress Note    Patient Name: Og Garcia  MRN: [id number]  Admission Date: 12/23/2021  Hospital Length of Stay: 10 days  Code Status: Prior  Attending Provider: Cecil Abernathy DO  Primary Care Provider: Hector Arndt DNP, FNP-C   Principal Problem: Denice gangrene    Subjective:     Interval History: intubated and sedated     Objective:     Vital Signs (Most Recent):  Temp: (!) 101.3 °F (38.5 °C) (01/02/22 1140)  Pulse: (!) 112 (01/02/22 1000)  Resp: 18 (01/02/22 1000)  BP: (!) 104/58 (01/02/22 1000)  SpO2: 100 % (01/02/22 1000) Vital Signs (24h Range):  Temp:  [98.8 °F (37.1 °C)-101.3 °F (38.5 °C)] 101.3 °F (38.5 °C)  Pulse:  [] 112  Resp:  [18-44] 18  SpO2:  [100 %] 100 %  BP: (102-148)/(57-75) 104/58     Weight: 95 kg (209 lb 7 oz)  Body mass index is 29.21 kg/m².      Intake/Output Summary (Last 24 hours) at 1/2/2022 1217  Last data filed at 1/2/2022 1013  Gross per 24 hour   Intake 610 ml   Output 300 ml   Net 310 ml       Physical Exam  Vitals reviewed.   Constitutional:       Appearance: He is ill-appearing.      Comments: Intubated and sedated   HENT:      Head:      Comments: Right temporal deformity     Nose: Nose normal.      Mouth/Throat:      Mouth: Mucous membranes are moist.   Eyes:      Conjunctiva/sclera: Conjunctivae normal.      Pupils: Pupils are equal, round, and reactive to light.   Cardiovascular:      Rate and Rhythm: Normal rate and regular rhythm.      Pulses: Normal pulses.      Heart sounds: Normal heart sounds.   Pulmonary:      Effort: No respiratory distress.      Breath sounds: No stridor. No wheezing, rhonchi or rales.      Comments: Ventilator driven breath sounds bilaterally  Abdominal:      Tenderness: There is no guarding.      Comments: Anterior abdominal wall with wound vac in place. Ostomy   Musculoskeletal:         General: No swelling or deformity.      Cervical back: Neck supple.      Right lower leg: No  edema.      Left lower leg: No edema.   Lymphadenopathy:      Cervical: No cervical adenopathy.   Skin:     General: Skin is warm and dry.   Neurological:      Cranial Nerves: No cranial nerve deficit.      Comments: Intubated and sedated         Vents:  Vent Mode: A/C (01/02/22 0835)  Set Rate: 16 BPM (01/02/22 0835)  Vt Set: 480 mL (01/02/22 0835)  Pressure Support: 12 cmH20 (12/29/21 0036)  PEEP/CPAP: 5 cmH20 (01/02/22 0835)  Oxygen Concentration (%): 50 (01/02/22 0421)  Peak Airway Pressure: 12 cmH2O (01/01/22 2018)  Total Ve: 14.2 mL (01/02/22 0835)  F/VT Ratio<105 (RSBI): (!) 82.23 (12/31/21 0830)    Lines/Drains/Airways     Central Venous Catheter Line                 Hemodialysis Catheter 12/30/21 0900 right internal jugular 3 days          Drain                 NG/OG Tube [place] sump 18 Fr. Left nostril -- days         Urethral Catheter 12/13/21 2130 16 Fr. 19 days         Colostomy 12/18/21 1030 Descending/sigmoid LUQ 15 days          Airway                 Airway - Non-Surgical 12/14/21 1047 19 days          Peripheral Intravenous Line                 Peripheral IV - Single Lumen 12/19/21 2126 20 G Anterior;Right Upper Arm 13 days         Peripheral IV - Single Lumen 12/29/21 1532 20 G Anterior;Left Forearm 3 days                Significant Labs:    CBC/Anemia Profile:  Recent Labs   Lab 12/31/21  1542 01/01/22  1547   WBC  --  13.44*   HGB 7.3* 9.7*   HCT 20.6* 27.5*   PLT  --  247   MCV  --  84.4   RDW  --  16.0*        Chemistries:  Recent Labs   Lab 01/01/22  0654   *   K 5.3*   CL 98   CO2 15*   BUN 80*   CREATININE 5.14*   CALCIUM 7.0*       All pertinent labs within the past 24 hours have been reviewed.    Significant Imaging:  I have reviewed all pertinent imaging results/findings within the past 24 hours.    Assessment/Plan:     * Denice gangrene  - s/p multiple debridements  - wound vac in place  - ID consulted for antibiotic management: He was treated with rocephin, daptomycin,  clindamycin. 12/21- change clindamycin to ampicillin and treat for another week  - 12/28 wound vac changed at bedside   1/1/22 -  To again today  OR today  1/2-- wound vac in place      Type 2 diabetes mellitus without complication, without long-term current use of insulin  - continue basal bolus  - fasting 131    Acute blood loss anemia  - H/H down to 5.5/16 - pt had bleeding from his HD insertion site yesterday this has now resolved.   - will transfuse 2 units PRBCs on HD  1/2- 9.7/27.5 - repeat CBC in AM    RAHAT (acute kidney injury)  - nephrology consulted   - no sign of renal recovery.  Continuing with scheduled hemodialysis for this patient.  - tunneled HD line placement 12/30  - 12/31 - potassium 6.36 - plan for HD again today  with 2 PRBC  1/2- continue HD per schedule, labs in AM     On mechanically assisted ventilation  - 12/14 intubated   - not doing well with PSV trials. Placed back on AC  - > 15 days intubated,  consider trach placement. Holding ticagrelor for possible trach on Monday 1/1/22- plan for trach Monday                  FLORESITA Shafer-ACNP  Pulmonology  [facility]

## 2022-01-02 NOTE — PLAN OF CARE
Problem: Adjustment to Illness (Sepsis/Septic Shock)  Goal: Optimal Coping  Outcome: Ongoing, Progressing     Problem: Bleeding (Sepsis/Septic Shock)  Goal: Absence of Bleeding  Outcome: Ongoing, Progressing     Problem: Glycemic Control Impaired (Sepsis/Septic Shock)  Goal: Blood Glucose Level Within Desired Range  Outcome: Ongoing, Progressing

## 2022-01-02 NOTE — ASSESSMENT & PLAN NOTE
Will take back to the OR tomorrow for exploratory laparotomy and debridement.    Hemoglobin 5.5; patient being transfused 2 units of packed red blood cells currently during dialysis    1/2:  Abdominal wound VAC in place, dressings in place.  Hold tube feeds at midnight.  Patient to OR for debridement and tracheostomy placement per Dr. Cazares

## 2022-01-02 NOTE — PROGRESS NOTES
Patient is chronically ill-appearing he remains intubated and sedate.    Physical exam general patient is chronically ill-appearing, heart is regular rate and rhythm, he has trace pretibial edema, lungs reveal coarse breath sounds with upper airway noises, abdomen is soft with decreased bowel sounds    Assessment/plan 1. Denice's gangrene-continue antibiotics  2. Sepsis  3. Respiratory failure-continue vent support  4. Acute renal failure- will hold dialysis today due to instability. Pt. Dialyzed yesterday.  5. Anemia-patient not getting EPO however lack of EPO administration would not cause a drop in his hematocrit to 16%.  Not sure that EPO would be of much benefit in this patient with acute renal failure and multi-system organ failure  6. Hyperkalemia- k 5.3, give lokelma or kayexalate for value of 6 or greater.  7. Met acidosis - monitor

## 2022-01-03 NOTE — ASSESSMENT & PLAN NOTE
- nephrology consulted   - no sign of renal recovery.  Continuing with scheduled hemodialysis for this patient.  - tunneled HD line placement 12/30  - 12/31 - potassium 6.36 - plan for HD again today  with 2 PRBC  1/2- continue HD per nephrology, labs in AM   1/3- potassium is 6.4. Creatinine is 7.3, CO2 is 9

## 2022-01-03 NOTE — DIALYSIS ROUNDING
Patient seen hemodialysis today, he is tolerating this poorly, his blood pressure presently is around 116/50, the ICU nurse is going to start some Levophed to help with blood pressure support during dialysis.

## 2022-01-03 NOTE — PROCEDURES
"Og Garcia is a 66 y.o. male patient.    Temp: 99.5 °F (37.5 °C) (01/02/22 1600)  Pulse: 105 (01/03/22 0645)  Resp: (!) 27 (01/03/22 0645)  BP: (!) 109/59 (01/03/22 0645)  SpO2: 100 % (01/03/22 0645)  Weight: 95 kg (209 lb 7 oz) (12/31/21 0300)  Height: 5' 11" (180.3 cm) (12/27/21 1038)       Procedures bronchoscopy done 12/25/2021 this is a late note for some reason the note did not come through on that date patient was called for what looks like a mucus plug we entered patient's lungs thru endotracheal tube.  Tracheobronchial tree was examined to level of subsegmental bronchi without abnormalities set for retained secretions minimal diffuse bronchitis patient such as secretions were suctioned out no evidence of a large plug    1/3/2022  "

## 2022-01-03 NOTE — CONSULTS
[facility]  Nephrology  Consult Note    Patient Name: Og Garcia  MRN: [id number]  Admission Date: 12/23/2021  Hospital Length of Stay: 11 days  Attending Provider: Cecil Abernathy DO   Primary Care Physician: Hector Arndt DNP, FNP-C  Principal Problem:Denice gangrene    Consults  Subjective:     HPI: The patient is known from the previous nephrology consult at [facility].  He is no at Specialty and continues to need dialysis support.      Past Medical History:   Diagnosis Date    Hyperlipidemia     Hypertension        Past Surgical History:   Procedure Laterality Date    COLOSTOMY N/A 12/18/2021    Procedure: CREATION, COLOSTOMY;  Surgeon: Veronica Hui MD;  Location: [facility] OR;  Service: General;  Laterality: N/A;    INCISION AND DRAINAGE OF ABSCESS N/A 12/13/2021    Procedure: INCISION AND DRAINAGE, ABSCESS PERINEAL REGION;  Surgeon: Harish Cazares MD;  Location: [facility] OR;  Service: General;  Laterality: N/A;  perirectal    LAPAROTOMY N/A 12/30/2021    Procedure: LAPAROTOMY, PELVIC WASHOUT;  Surgeon: Veronica Hui MD;  Location: [facility] OR;  Service: General;  Laterality: N/A;       Review of patient's allergies indicates:  No Known Allergies  Current Facility-Administered Medications   Medication Frequency    0.9%  NaCl infusion (for blood administration) Q24H PRN    0.9%  NaCl infusion PRN    acetaminophen tablet 500 mg Q6H PRN    ampicillin-sulbactam (UNASYN) 1.5 g in sodium chloride 0.9 % 100 mL IVPB (MB+) Q12H    dextrose 50% injection 12.5 g PRN    fentaNYL 2500 mcg in D5W 250 mL infusion premix (titrating) (conc: 10 mcg/mL) PRN    glucagon (human recombinant) injection 1 mg PRN    heparin (porcine) injection 4,000 Units PRN    insulin aspart U-100 injection 1-10 Units Q6H PRN    insulin detemir U-100 injection 45 Units QHS    NORepinephrine 32 mg in dextrose 5 % 250 mL infusion PRN    pantoprazole suspension 40 mg Daily    propofol  (DIPRIVAN) 10 mg/mL infusion PRN    sodium chloride 0.9% bolus 250 mL PRN     Family History     Problem Relation (Age of Onset)    Cancer Father    Diabetes Father    Hypertension Mother, Father        Tobacco Use    Smoking status: Current Every Day Smoker     Packs/day: 0.50     Types: Cigarettes    Smokeless tobacco: Never Used   Substance and Sexual Activity    Alcohol use: Not Currently    Drug use: Never    Sexual activity: Yes     Review of Systems   Unable to perform ROS: Intubated     Objective:     Vital Signs (Most Recent):  Temp: 99.6 °F (37.6 °C) (01/03/22 1144)  Pulse: 109 (01/03/22 1315)  Resp: 19 (01/03/22 1315)  BP: (!) 157/75 (01/03/22 1315)  SpO2: 100 % (01/03/22 1315)  O2 Device (Oxygen Therapy): ventilator (01/03/22 0930) Vital Signs (24h Range):  Temp:  [99.5 °F (37.5 °C)-99.6 °F (37.6 °C)] 99.6 °F (37.6 °C)  Pulse:  [] 109  Resp:  [16-49] 19  SpO2:  [78 %-100 %] 100 %  BP: ()/(41-90) 157/75     Weight: 95 kg (209 lb 7 oz) (12/31/21 0300)  Body mass index is 29.21 kg/m².  Body surface area is 2.18 meters squared.    I/O last 3 completed shifts:  In: 1175 [I.V.:300; NG/GT:775; IV Piggyback:100]  Out: 1400 [Urine:200; Stool:1200]    Physical Exam  Vitals reviewed.   Constitutional:       Appearance: He is ill-appearing.   HENT:      Mouth/Throat:      Mouth: Mucous membranes are moist.   Cardiovascular:      Rate and Rhythm: Regular rhythm.      Pulses: Normal pulses.   Pulmonary:      Comments: The patient is ventilated  Abdominal:      Palpations: Abdomen is soft.         Significant Labs:  BMP:   Recent Labs   Lab 01/03/22  0533   GLU 65*   *   K 6.4*   CL 98   CO2 9*   *   CREATININE 7.30*   CALCIUM 7.1*     CBC:   Recent Labs   Lab 01/03/22  1045   WBC 17.07*   RBC 2.41*   HGB 7.2*   HCT 21.6*      MCV 89.6   MCH 29.9   MCHC 33.3       Significant Imaging:  Labs: Reviewed    Assessment/Plan:     * Denice gangrene  Continue wound care    RAHAT (acute  kidney injury)  Dialysis Monday   Continue with scheduled hemodialysis for this patient.    On mechanically assisted ventilation  1. Wean as tolerated  2.  Plan for trach        Thank you for your consult. I will follow-up with patient. Please contact us if you have any additional questions.    Edwin Pryor Jr, MD  Nephrology  [facility]

## 2022-01-03 NOTE — SUBJECTIVE & OBJECTIVE
Past Medical History:   Diagnosis Date    Hyperlipidemia     Hypertension        Past Surgical History:   Procedure Laterality Date    COLOSTOMY N/A 12/18/2021    Procedure: CREATION, COLOSTOMY;  Surgeon: Veronica Hui MD;  Location: [facility] [place];  Service: General;  Laterality: N/A;    INCISION AND DRAINAGE OF ABSCESS N/A 12/13/2021    Procedure: INCISION AND DRAINAGE, ABSCESS PERINEAL REGION;  Surgeon: Harish Cazares MD;  Location: [facility] OR;  Service: General;  Laterality: N/A;  perirectal    LAPAROTOMY N/A 12/30/2021    Procedure: LAPAROTOMY, PELVIC WASHOUT;  Surgeon: Veronica Hui MD;  Location: [facility] OR;  Service: General;  Laterality: N/A;       Review of patient's allergies indicates:  No Known Allergies  Current Facility-Administered Medications   Medication Frequency    0.9%  NaCl infusion (for blood administration) Q24H PRN    0.9%  NaCl infusion PRN    acetaminophen tablet 500 mg Q6H PRN    ampicillin-sulbactam (UNASYN) 1.5 g in sodium chloride 0.9 % 100 mL IVPB (MB+) Q12H    dextrose 50% injection 12.5 g PRN    fentaNYL 2500 mcg in D5W 250 mL infusion premix (titrating) (conc: 10 mcg/mL) PRN    glucagon (human recombinant) injection 1 mg PRN    heparin (porcine) injection 4,000 Units PRN    insulin aspart U-100 injection 1-10 Units Q6H PRN    insulin detemir U-100 injection 45 Units QHS    NORepinephrine 32 mg in dextrose 5 % 250 mL infusion PRN    pantoprazole suspension 40 mg Daily    propofol (DIPRIVAN) 10 mg/mL infusion PRN    sodium chloride 0.9% bolus 250 mL PRN     Family History     Problem Relation (Age of Onset)    Cancer Father    Diabetes Father    Hypertension Mother, Father        Tobacco Use    Smoking status: Current Every Day Smoker     Packs/day: 0.50     Types: Cigarettes    Smokeless tobacco: Never Used   Substance and Sexual Activity    Alcohol use: Not Currently    Drug use: Never    Sexual activity: Yes     Review of Systems   Unable to perform  ROS: Intubated     Objective:     Vital Signs (Most Recent):  Temp: 99.6 °F (37.6 °C) (01/03/22 1144)  Pulse: 109 (01/03/22 1315)  Resp: 19 (01/03/22 1315)  BP: (!) 157/75 (01/03/22 1315)  SpO2: 100 % (01/03/22 1315)  O2 Device (Oxygen Therapy): ventilator (01/03/22 0930) Vital Signs (24h Range):  Temp:  [99.5 °F (37.5 °C)-99.6 °F (37.6 °C)] 99.6 °F (37.6 °C)  Pulse:  [] 109  Resp:  [16-49] 19  SpO2:  [78 %-100 %] 100 %  BP: ()/(41-90) 157/75     Weight: 95 kg (209 lb 7 oz) (12/31/21 0300)  Body mass index is 29.21 kg/m².  Body surface area is 2.18 meters squared.    I/O last 3 completed shifts:  In: 1175 [I.V.:300; NG/GT:775; IV Piggyback:100]  Out: 1400 [Urine:200; Stool:1200]    Physical Exam  Vitals reviewed.   Constitutional:       Appearance: He is ill-appearing.   HENT:      Mouth/Throat:      Mouth: Mucous membranes are moist.   Cardiovascular:      Rate and Rhythm: Regular rhythm.      Pulses: Normal pulses.   Pulmonary:      Comments: The patient is ventilated  Abdominal:      Palpations: Abdomen is soft.         Significant Labs:  BMP:   Recent Labs   Lab 01/03/22  0533   GLU 65*   *   K 6.4*   CL 98   CO2 9*   *   CREATININE 7.30*   CALCIUM 7.1*     CBC:   Recent Labs   Lab 01/03/22  1045   WBC 17.07*   RBC 2.41*   HGB 7.2*   HCT 21.6*      MCV 89.6   MCH 29.9   MCHC 33.3       Significant Imaging:  Labs: Reviewed

## 2022-01-03 NOTE — HPI
The patient is known from the previous nephrology consult at [facility].  He is no at Specialty and continues to need dialysis support.

## 2022-01-03 NOTE — INTERVAL H&P NOTE
The patient has been examined and the H&P has been reviewed:    I concur with the findings and no changes have occurred since H&P was written.    OR again for continued washout/debridement with tracheostomy placement    Active Hospital Problems    Diagnosis  POA    *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]  Yes    Acute blood loss anemia [D62]  No    Type 2 diabetes mellitus without complication, without long-term current use of insulin [E11.9]  Yes    Necrotizing fasciitis [M72.6]  Yes    Hypocalcemia [E83.51]  Yes    RAHAT (acute kidney injury) [N17.9]  Yes    On mechanically assisted ventilation [Z99.11]  Not Applicable    Hypertension [I10]  Yes      Resolved Hospital Problems   No resolved problems to display.

## 2022-01-03 NOTE — PLAN OF CARE
Problem: Glycemic Control Impaired (Sepsis/Septic Shock)  Goal: Blood Glucose Level Within Desired Range  Outcome: Ongoing, Progressing     Problem: Infection Progression (Sepsis/Septic Shock)  Goal: Absence of Infection Signs and Symptoms  Outcome: Ongoing, Progressing     Problem: Renal Function Impairment (Acute Kidney Injury/Impairment)  Goal: Effective Renal Function  Outcome: Ongoing, Progressing     Problem: Infection  Goal: Absence of Infection Signs and Symptoms  Outcome: Ongoing, Progressing     Problem: Fall Injury Risk  Goal: Absence of Fall and Fall-Related Injury  Outcome: Ongoing, Progressing     Problem: Communication Impairment (Mechanical Ventilation, Invasive)  Goal: Effective Communication  Outcome: Ongoing, Progressing

## 2022-01-03 NOTE — PROGRESS NOTES
[facility]  Pulmonology  Progress Note    Patient Name: Og Garcia  MRN: [id number]  Admission Date: 12/23/2021  Hospital Length of Stay: 11 days  Code Status: Prior  Attending Provider: Cecil Abernathy DO  Primary Care Provider: Hector Arndt DNP, FNP-C   Principal Problem: Denice gangrene    Subjective:     Interval History: No acute events overnight. The patient remains intubated and sedated. Currently afebrile but tmax of 101.3.     Objective:     Vital Signs (Most Recent):  Temp: 99.6 °F (37.6 °C) (01/03/22 1144)  Pulse: 101 (01/03/22 1200)  Resp: (!) 25 (01/03/22 1200)  BP: (!) 117/55 (01/03/22 1200)  SpO2: 100 % (01/03/22 1200) Vital Signs (24h Range):  Temp:  [99.5 °F (37.5 °C)-99.6 °F (37.6 °C)] 99.6 °F (37.6 °C)  Pulse:  [] 101  Resp:  [12-49] 25  SpO2:  [78 %-100 %] 100 %  BP: ()/(41-65) 117/55     Weight: 95 kg (209 lb 7 oz)  Body mass index is 29.21 kg/m².      Intake/Output Summary (Last 24 hours) at 1/3/2022 1215  Last data filed at 1/3/2022 0500  Gross per 24 hour   Intake 975 ml   Output 1100 ml   Net -125 ml       Physical Exam  Vitals reviewed.   Constitutional:       General: He is not in acute distress.     Appearance: He is ill-appearing.      Comments: Intubated and sedated   HENT:      Head: Normocephalic and atraumatic.      Comments: Right temporal deformity     Right Ear: External ear normal.      Left Ear: External ear normal.      Nose: Nose normal.      Mouth/Throat:      Mouth: Mucous membranes are moist.   Eyes:      Conjunctiva/sclera: Conjunctivae normal.      Pupils: Pupils are equal, round, and reactive to light.   Cardiovascular:      Rate and Rhythm: Normal rate and regular rhythm.      Pulses: Normal pulses.      Heart sounds: Normal heart sounds.   Pulmonary:      Effort: No respiratory distress.      Breath sounds: No stridor. No wheezing, rhonchi or rales.      Comments: Clear breath sounds anteriorly  Abdominal:       Palpations: Abdomen is soft.      Tenderness: There is no guarding.      Comments: Anterior abdominal wall with wound vac in place. Ostomy   Genitourinary:     Comments: Packing in place  Musculoskeletal:         General: No swelling or deformity.      Cervical back: Neck supple.      Right lower leg: No edema.      Left lower leg: No edema.   Lymphadenopathy:      Cervical: No cervical adenopathy.   Skin:     General: Skin is warm and dry.   Neurological:      Cranial Nerves: No cranial nerve deficit.      Comments: Currently Intubated and sedated         Vents:  Vent Mode: A/C (01/03/22 0930)  Set Rate: 15 BPM (01/03/22 0930)  Vt Set: 480 mL (01/03/22 0930)  Pressure Support: 12 cmH20 (12/29/21 0036)  PEEP/CPAP: 5 cmH20 (01/03/22 0930)  Oxygen Concentration (%): 40 (01/03/22 0930)  Peak Airway Pressure: 16 cmH2O (01/03/22 0930)  Total Ve: 9.3 mL (01/03/22 0930)  F/VT Ratio<105 (RSBI): (!) 82.23 (12/31/21 0830)    Lines/Drains/Airways     Central Venous Catheter Line                 Hemodialysis Catheter 12/30/21 0900 right internal jugular 4 days          Drain                 NG/OG Tube [place] sump 18 Fr. Left nostril -- days         Urethral Catheter 12/13/21 2130 16 Fr. 20 days         Colostomy 12/18/21 1030 Descending/sigmoid LUQ 16 days          Airway                 Airway - Non-Surgical 12/14/21 1047 20 days          Peripheral Intravenous Line                 Peripheral IV - Single Lumen 12/19/21 2126 20 G Anterior;Right Upper Arm 14 days                Significant Labs:    CBC/Anemia Profile:  Recent Labs   Lab 01/01/22  1547   WBC 13.44*   HGB 9.7*   HCT 27.5*      MCV 84.4   RDW 16.0*        Chemistries:  Recent Labs   Lab 01/03/22  0533   *   K 6.4*   CL 98   CO2 9*   *   CREATININE 7.30*   CALCIUM 7.1*       All pertinent labs within the past 24 hours have been reviewed.    Significant Imaging:  I have reviewed all pertinent imaging results/findings within the past 24  hours.    Assessment/Plan:     * Denice gangrene  - s/p multiple debridements  - wound vac in place  - ID consulted for antibiotic management: He was treated with rocephin, daptomycin, clindamycin. 12/21- change clindamycin to ampicillin and treat for another week  - 12/28 wound vac changed at bedside   1/1/22 -  To again today  OR today  1/3-- wound vac in place      Type 2 diabetes mellitus without complication, without long-term current use of insulin  accuchecks look good. Continue with current care    Acute blood loss anemia  - H/H down to 5.5/16 - pt had bleeding from his HD insertion site yesterday this has now resolved.   - will transfuse 2 units PRBCs on HD  1/2- 9.7/27.5 - repeat CBC in AM    RAHAT (acute kidney injury)  - nephrology consulted   - no sign of renal recovery.  Continuing with scheduled hemodialysis for this patient.  - tunneled HD line placement 12/30  - 12/31 - potassium 6.36 - plan for HD again today  with 2 PRBC  1/2- continue HD per nephrology, labs in AM   1/3- potassium is 6.4. Creatinine is 7.3, CO2 is 9      On mechanically assisted ventilation  - 12/14 intubated   - not doing well with PSV trials. Placed back on AC  - > 15 days intubated,  consider trach placement. Holding ticagrelor for possible trach on Monday 1/1/22- plan for trach Monday   1/3- FiO2 down to 40%. Patient is going for trach today    Hypertension  - holding antihypertensives                 Cecil Abernathy, DO  Pulmonology  [facility]

## 2022-01-03 NOTE — SUBJECTIVE & OBJECTIVE
Interval History: No acute events overnight. The patient remains intubated and sedated. Currently afebrile but tmax of 101.3.     Objective:     Vital Signs (Most Recent):  Temp: 99.6 °F (37.6 °C) (01/03/22 1144)  Pulse: 101 (01/03/22 1200)  Resp: (!) 25 (01/03/22 1200)  BP: (!) 117/55 (01/03/22 1200)  SpO2: 100 % (01/03/22 1200) Vital Signs (24h Range):  Temp:  [99.5 °F (37.5 °C)-99.6 °F (37.6 °C)] 99.6 °F (37.6 °C)  Pulse:  [] 101  Resp:  [12-49] 25  SpO2:  [78 %-100 %] 100 %  BP: ()/(41-65) 117/55     Weight: 95 kg (209 lb 7 oz)  Body mass index is 29.21 kg/m².      Intake/Output Summary (Last 24 hours) at 1/3/2022 1215  Last data filed at 1/3/2022 0500  Gross per 24 hour   Intake 975 ml   Output 1100 ml   Net -125 ml       Physical Exam  Vitals reviewed.   Constitutional:       General: He is not in acute distress.     Appearance: He is ill-appearing.      Comments: Intubated and sedated   HENT:      Head: Normocephalic and atraumatic.      Comments: Right temporal deformity     Right Ear: External ear normal.      Left Ear: External ear normal.      Nose: Nose normal.      Mouth/Throat:      Mouth: Mucous membranes are moist.   Eyes:      Conjunctiva/sclera: Conjunctivae normal.      Pupils: Pupils are equal, round, and reactive to light.   Cardiovascular:      Rate and Rhythm: Normal rate and regular rhythm.      Pulses: Normal pulses.      Heart sounds: Normal heart sounds.   Pulmonary:      Effort: No respiratory distress.      Breath sounds: No stridor. No wheezing, rhonchi or rales.      Comments: Clear breath sounds anteriorly  Abdominal:      Palpations: Abdomen is soft.      Tenderness: There is no guarding.      Comments: Anterior abdominal wall with wound vac in place. Ostomy   Genitourinary:     Comments: Packing in place  Musculoskeletal:         General: No swelling or deformity.      Cervical back: Neck supple.      Right lower leg: No edema.      Left lower leg: No edema.    Lymphadenopathy:      Cervical: No cervical adenopathy.   Skin:     General: Skin is warm and dry.   Neurological:      Cranial Nerves: No cranial nerve deficit.      Comments: Currently Intubated and sedated         Vents:  Vent Mode: A/C (01/03/22 0930)  Set Rate: 15 BPM (01/03/22 0930)  Vt Set: 480 mL (01/03/22 0930)  Pressure Support: 12 cmH20 (12/29/21 0036)  PEEP/CPAP: 5 cmH20 (01/03/22 0930)  Oxygen Concentration (%): 40 (01/03/22 0930)  Peak Airway Pressure: 16 cmH2O (01/03/22 0930)  Total Ve: 9.3 mL (01/03/22 0930)  F/VT Ratio<105 (RSBI): (!) 82.23 (12/31/21 0830)    Lines/Drains/Airways     Central Venous Catheter Line                 Hemodialysis Catheter 12/30/21 0900 right internal jugular 4 days          Drain                 NG/OG Tube Cross sump 18 Fr. Left nostril -- days         Urethral Catheter 12/13/21 2130 16 Fr. 20 days         Colostomy 12/18/21 1030 Descending/sigmoid LUQ 16 days          Airway                 Airway - Non-Surgical 12/14/21 1047 20 days          Peripheral Intravenous Line                 Peripheral IV - Single Lumen 12/19/21 2126 20 G Anterior;Right Upper Arm 14 days                Significant Labs:    CBC/Anemia Profile:  Recent Labs   Lab 01/01/22  1547   WBC 13.44*   HGB 9.7*   HCT 27.5*      MCV 84.4   RDW 16.0*        Chemistries:  Recent Labs   Lab 01/03/22  0533   *   K 6.4*   CL 98   CO2 9*   *   CREATININE 7.30*   CALCIUM 7.1*       All pertinent labs within the past 24 hours have been reviewed.    Significant Imaging:  I have reviewed all pertinent imaging results/findings within the past 24 hours.

## 2022-01-03 NOTE — PROGRESS NOTES
[facility]  Adult Nutrition  Follow-up Note         Reason for Assessment  Reason For Assessment: RD follow-up  Nutrition Risk Screen: tube feeding or parenteral nutrition  Malnutrition  Is Patient Malnourished: No  Nutrition Diagnosis  Increased protein Needs   related to Decreased/ impaired skin integrity as evidenced by wounds    Nutrition Diagnosis Status: Chronic/ continues      Nutrition Risk  Level of Risk/Frequency of Follow-up: high   Chewing or Swallowing Difficulty?: Swallowing difficulty  Estimated/Assessed Needs  RMR ([place]-St. Jeor Equation): 1752.13 Activity Factor: 1 Injury Factor: 1.2   Total Ve: 14.2 mL Temp: 99.5 °F (37.5 °C)Axillary  Weight Used For Calorie Calculations: 95 kg (209 lb 7 oz)   Energy Need Method: [place] (modified) Energy Calorie Requirements (kcal): 2340  Weight Used For Protein Calculations: 82 kg (180 lb 12.4 oz)  Protein Requirements:   Estimated Fluid Requirement Method: RDA Method Fluid Requirements (mL): 2340  RDA Method (mL): 2340     Nutrition Prescription / Recommendations  Recommendation/Intervention: Continue NG tube feeding: Nepro @ 50ml/hr; H20 flush of 75ml q 2hr  Goals: pt will tolerate tube feeding; wound healing; pt will meet estimated nutritional needs  Nutrition Goal Status: progressing towards goal  Communication of RD Recs: reviewed with physician  Current Diet Order: NG tube feeding: Nepro @ 50ml/hr; H20 flush of 75ml q 2hr  Current Nutrition Support Formula Ordered: Nepro  Current Nutrition Support Rate Ordered: 50 (ml)  Current Nutrition Support Frequency Ordered: hourly  Recommended Diet: Enteral Nutrition NG tube feeding: Nepro @ 50ml/hr; H20 flush of 75ml q 2hr  Recommended Oral Supplement: No Oral Supplements  Is Nutrition Support Recommended: No  Is Education Recommended: No  Monitor and Evaluation  % current Intake: Enteral Nutrition progressing to goal  % intake to meet estimated needs: Enteral Nutrition   Food and  "Nutrient Intake: enteral nutrition intake  Food and Nutrient Adminstration: enteral and parenteral nutrition administration  Anthropometric Measurements: height/length,weight,weight change,body mass index  Biochemical Data, Medical Tests and Procedures: electrolyte and renal panel,glucose/endocrine profile  Nutrition-Focused Physical Findings: skin  Enteral Calories (kcal): 2160  Enteral Protein (gm): 96  Enteral (Free Water) Fluid (mL): 864  Free Water Flush Fluid (mL): 900  Other Calories (kcal): 507  Total Calories (kcal): 2271  Total Fluid Intake (mL): 1764  Energy Calories Required: meeting needs  Protein Required: meeting needs  Fluid Required: meeting needs  Tolerance: tolerating  Current Medical Diagnosis and Past Medical History     Past Medical History:   Diagnosis Date    Hyperlipidemia     Hypertension      Nutrition/Diet History  Food Allergies: NKFA  Lab/Procedures/Meds  Recent Labs   Lab 01/03/22  0533   *   K 6.4*   *   CREATININE 7.30*   GLU 65*   CALCIUM 7.1*   CL 98     Last A1c: No results found for: HGBA1C  Lab Results   Component Value Date    RBC 3.26 (L) 01/01/2022    HGB 9.7 (L) 01/01/2022    HCT 27.5 (L) 01/01/2022    MCV 84.4 01/01/2022    MCH 29.8 01/01/2022    MCHC 35.3 01/01/2022     Pertinent Labs Reviewed: reviewed  Pertinent Labs Comments: Hct 27.5, Na 130, K 6.4, Bun 117, Cr 7.30, Glu 65, Alb 1.0  Pertinent Medications Reviewed: reviewed  Pertinent Medications Comments: heparin, insulin  Anthropometrics  Temp: 99.5 °F (37.5 °C)  Height Method: Stated  Height: 5' 11" (180.3 cm)  Height (inches): 71 in  Weight Method: Bed Scale  Weight: 95 kg (209 lb 7 oz)  Weight (lb): 209.44 lb  Ideal Body Weight (IBW), Male: 172 lb  % Ideal Body Weight, Male (lb): 114.72 %  BMI (Calculated): 29.2  BMI Grade: 25 - 29.9 - overweight     Nutrition by Nursing  Diet/Nutrition Received: tube feeding     Diet/Feeding Assistance: total feed  Diet/Feeding Tolerance: good  Last Bowel Movement: " 01/03/22       NG/OG Tube [place] sump 18 Fr. Left nostril-Feeding Type: continuous,by pump       NG/OG Tube [place] sump 18 Fr. Left nostril-Current Rate (mL/hr): 65 mL/hr       NG/OG Tube [place] sump 18 Fr. Left nostril-Goal Rate (mL/hr): 65 mL/hr       NG/OG Tube [place] sump 18 Fr. Left nostril-Formula Name: vital AF  Nutrition Follow-Up  RD Follow-up?: Yes  Assessment and Plan  No new Assessment & Plan notes have been filed under this hospital service since the last note was generated.  Service: Nutrition

## 2022-01-03 NOTE — ASSESSMENT & PLAN NOTE
- 12/14 intubated   - not doing well with PSV trials. Placed back on AC  - > 15 days intubated,  consider trach placement. Holding ticagrelor for possible trach on Monday 1/1/22- plan for trach Monday   1/3- FiO2 down to 40%. Patient is going for trach today

## 2022-01-03 NOTE — ASSESSMENT & PLAN NOTE
- s/p multiple debridements  - wound vac in place  - ID consulted for antibiotic management: He was treated with rocephin, daptomycin, clindamycin. 12/21- change clindamycin to ampicillin and treat for another week  - 12/28 wound vac changed at bedside   1/1/22 -  To again today  OR today  1/3-- wound vac in place

## 2022-01-04 NOTE — ASSESSMENT & PLAN NOTE
- nephrology consulted   - no sign of renal recovery.  Continuing with scheduled hemodialysis for this patient.  - tunneled HD line placement 12/30  - 12/31 - potassium 6.36 - plan for HD again today  with 2 PRBC  1/2- continue HD per nephrology, labs in AM   1/3- potassium is 6.4. Creatinine is 7.3, CO2 is 9  1/4- Potassium is 6.2. Creatinine is 6.1. Nephrology note reviewed and appreciated. He dialzyed on 1/3. Continue HD per nephrology. The patient remains critically ill

## 2022-01-04 NOTE — SUBJECTIVE & OBJECTIVE
Interval History: No acute events overnight. The patient remains intubated and sedated. He is now on pressors. Tmax of 102. To OR today.     Objective:     Vital Signs (Most Recent):  Temp: (!) 102.1 °F (38.9 °C) (01/04/22 1200)  Pulse: (!) 113 (01/04/22 1212)  Resp: (!) 25 (01/04/22 1212)  BP: (!) 90/48 (01/04/22 0815)  SpO2: 100 % (01/04/22 1212) Vital Signs (24h Range):  Temp:  [98.8 °F (37.1 °C)-102.1 °F (38.9 °C)] 102.1 °F (38.9 °C)  Pulse:  [] 113  Resp:  [12-36] 25  SpO2:  [100 %] 100 %  BP: ()/(37-88) 90/48     Weight: 93.5 kg (206 lb 2.1 oz)  Body mass index is 28.75 kg/m².      Intake/Output Summary (Last 24 hours) at 1/4/2022 1300  Last data filed at 1/4/2022 0730  Gross per 24 hour   Intake 1444.4 ml   Output 2045 ml   Net -600.6 ml       Physical Exam  Vitals reviewed.   Constitutional:       General: He is not in acute distress.     Appearance: He is ill-appearing.      Comments: Intubated and sedated   HENT:      Head: Normocephalic and atraumatic.      Comments: Right temporal deformity     Right Ear: External ear normal.      Left Ear: External ear normal.      Nose: Nose normal.      Mouth/Throat:      Mouth: Mucous membranes are moist.   Eyes:      Conjunctiva/sclera: Conjunctivae normal.      Pupils: Pupils are equal, round, and reactive to light.   Cardiovascular:      Rate and Rhythm: Normal rate and regular rhythm.      Pulses: Normal pulses.      Heart sounds: Normal heart sounds.   Pulmonary:      Effort: No respiratory distress.      Breath sounds: No stridor. No wheezing, rhonchi or rales.      Comments: Clear breath sounds anteriorly  Abdominal:      Palpations: Abdomen is soft.      Tenderness: There is no guarding.      Comments: Anterior abdominal wall with wound vac in place. Ostomy   Genitourinary:     Comments: Packing in place  Musculoskeletal:         General: No swelling or deformity.      Cervical back: Neck supple.      Right lower leg: No edema.      Left lower  leg: No edema.   Lymphadenopathy:      Cervical: No cervical adenopathy.   Skin:     General: Skin is warm and dry.   Neurological:      Cranial Nerves: No cranial nerve deficit.      Comments: Currently Intubated and sedated         Vents:  Vent Mode: A/C (01/04/22 1200)  Set Rate: 16 BPM (01/04/22 1200)  Vt Set: 480 mL (01/04/22 1200)  Pressure Support: 12 cmH20 (12/29/21 0036)  PEEP/CPAP: 5 cmH20 (01/04/22 1200)  Oxygen Concentration (%): 40 (01/04/22 0449)  Peak Airway Pressure: 19 cmH2O (01/04/22 1200)  Total Ve: 13.4 mL (01/04/22 1200)  F/VT Ratio<105 (RSBI): (!) 65.27 (01/03/22 2345)    Lines/Drains/Airways     Central Venous Catheter Line                 Hemodialysis Catheter 12/30/21 0900 right internal jugular 5 days          Drain                 NG/OG Tube [place] sump 18 Fr. Left nostril -- days         Urethral Catheter 12/13/21 2130 16 Fr. 21 days         Colostomy 12/18/21 1030 Descending/sigmoid LUQ 17 days          Airway                 Airway - Non-Surgical 12/14/21 1047 21 days          Peripheral Intravenous Line                 Peripheral IV - Single Lumen 12/19/21 2126 20 G Anterior;Right Upper Arm 15 days                Significant Labs:    CBC/Anemia Profile:  Recent Labs   Lab 01/03/22  1045   WBC 17.07*   HGB 7.2*   HCT 21.6*      MCV 89.6   RDW 15.8*        Chemistries:  Recent Labs   Lab 01/03/22  0533 01/04/22  0837   * 131*   K 6.4* 6.2*   CL 98 95*   CO2 9* 16*   * 75*   CREATININE 7.30* 6.10*   CALCIUM 7.1* 6.9*       All pertinent labs within the past 24 hours have been reviewed.    Significant Imaging:  I have reviewed all pertinent imaging results/findings within the past 24 hours.

## 2022-01-04 NOTE — ANESTHESIA PREPROCEDURE EVALUATION
01/04/2022  Og Garcia is a 66 y.o., male.    Anesthesia Evaluation    I have reviewed the Patient Summary Reports.   I have reviewed the NPO Status.   I have reviewed the Medications.     Review of Systems         Anesthesia Plan  Type of Anesthesia, risks & benefits discussed:  Anesthesia Type:  general    Patient's Preference:   Plan Factors:          Intra-op Monitoring Plan: standard ASA monitors  Intra-op Monitoring Plan Comments:   Post Op Pain Control Plan: IV/PO Opioids PRN  Post Op Pain Control Plan Comments:     Induction:   IV  Beta Blocker:         Informed Consent: Patient representative understands risks and agrees with Anesthesia plan.  Questions answered. Anesthesia consent signed with patient representative.  ASA Score: 4     Day of Surgery Review of History & Physical:            Ready For Surgery From Anesthesia Perspective.     NPO greater than 8 hours  NAC  NKDA     Hct 22  K 6.4  Cr 7.3  Ca 7.1  12/17/21 EKG: Sinus rhythm 94 bpm;   RBBB with left anterior fascicular block   Inferior infarct - age undetermined  12/15/21 Echo: EF 40%     HTN  Diabetes mellitus  H/o CVA  Respiratory failure . . . 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

## 2022-01-04 NOTE — DISCHARGE SUMMARY
[facility] ICU  Discharge Note  Short Stay    Procedure(s) (LRB):  LAPAROTOMY, EXPLORATORY (N/A)  CREATION, COLOSTOMY (N/A)  IRRIGATION AND DEBRIDEMENT (Right)    OUTCOME: Patient tolerated treatment/procedure well without complication and is now ready for discharge.    Patient with severe necrotizing and soft tissue infection.  Require multiple washouts will probably need more in the future.  [facility]    DISPOSITION: Skilled Nursing Facility    FINAL DIAGNOSIS:  Septic shock    FOLLOWUP: In clinic    DISCHARGE INSTRUCTIONS:    Discharge Procedure Orders   Diet Adult Regular     Remove dressing in 24 hours     Notify your health care provider if you experience any of the following:  temperature >100.4     Notify your health care provider if you experience any of the following:  persistent nausea and vomiting or diarrhea     Notify your health care provider if you experience any of the following:  severe uncontrolled pain     Notify your health care provider if you experience any of the following:  redness, tenderness, or signs of infection (pain, swelling, redness, odor or green/yellow discharge around incision site)     Notify your health care provider if you experience any of the following:  difficulty breathing or increased cough     Notify your health care provider if you experience any of the following:  severe persistent headache     Notify your health care provider if you experience any of the following:  worsening rash     Notify your health care provider if you experience any of the following:  persistent dizziness, light-headedness, or visual disturbances     Notify your health care provider if you experience any of the following:  increased confusion or weakness     Notify your health care provider if you experience any of the following:     Shower on day dressing removed (No bath)        TIME SPENT ON DISCHARGE: 20 minutes

## 2022-01-04 NOTE — TRANSFER OF CARE
Anesthesia Transfer of Care Note    Patient: Og Wolf [place]    Procedure(s) Performed: Procedure(s) (LRB):  CREATION, TRACHEOSTOMY (N/A)  DEBRIDEMENT, LOWER EXTREMITY (Right)  LAPAROTOMY, EXPLORATORY (N/A)    Patient location: PACU    Anesthesia Type: general    Transport from OR: Transported from OR on room air with adequate spontaneous ventilation    Post pain: adequate analgesia    Post assessment: no apparent anesthetic complications and tolerated procedure well    Post vital signs: stable    Level of consciousness: awake    Nausea/Vomiting: no nausea    Complications: none    Transfer of care protocol was followed      Last vitals:   Visit Vitals  BP (!) 165/75 (BP Location: Right arm, Patient Position: Lying)   Pulse 77   Temp 37.7 °C (99.9 °F) (Oral)   Resp 12   SpO2 100%

## 2022-01-04 NOTE — ASSESSMENT & PLAN NOTE
- s/p multiple debridements  - wound vac in place  - ID consulted for antibiotic management: He was treated with rocephin, daptomycin, clindamycin. 12/21- change clindamycin to ampicillin and treat for another week  - 12/28 wound vac changed at bedside   1/1/22 -  To again today  OR today  1/3-- wound vac in place  1/4 back to OR today

## 2022-01-04 NOTE — NURSING
Wound vac  Noted to beep.. assessements of abd. Wound done ..  abd dressing is saturated with what appears to be stool.. colostomy bag is on but loose around edges .. also leaking stool.. colostomy bag and abd drsg removed .. areas cleaned with normal saline.. colostomy bag replaced .. wet to dry 4x4 applied to abd area with abd pads in place complete  bed bath done and linen changed .. pt remains npo at this time

## 2022-01-04 NOTE — ASSESSMENT & PLAN NOTE
Had some hypoglycemia overnight. Monitor closely.  Nutrition likely held in preparation of surgery. If he continues to have issues with hypoglycemia we will hold levemir

## 2022-01-04 NOTE — NURSING
Glucose 65 at present .. pt is npo.. dextrose 50 % 12.5 g given iv for hypoglycemia .. no acute distress noted .. monitor. Will monitor      0710 glucose rechecked  bs 102 .. pt status reported to 7a shift

## 2022-01-04 NOTE — PROGRESS NOTES
[facility]  Pulmonology  Progress Note    Patient Name: Og Garcia  MRN: [id number]  Admission Date: 12/23/2021  Hospital Length of Stay: 12 days  Code Status: Prior  Attending Provider: Cecil Abernathy DO  Primary Care Provider: Hector Arndt DNP, FNP-C   Principal Problem: Denice gangrene    Subjective:     Interval History: No acute events overnight. The patient remains intubated and sedated. He is now on pressors. Tmax of 102. To OR today.     Objective:     Vital Signs (Most Recent):  Temp: (!) 102.1 °F (38.9 °C) (01/04/22 1200)  Pulse: (!) 113 (01/04/22 1212)  Resp: (!) 25 (01/04/22 1212)  BP: (!) 90/48 (01/04/22 0815)  SpO2: 100 % (01/04/22 1212) Vital Signs (24h Range):  Temp:  [98.8 °F (37.1 °C)-102.1 °F (38.9 °C)] 102.1 °F (38.9 °C)  Pulse:  [] 113  Resp:  [12-36] 25  SpO2:  [100 %] 100 %  BP: ()/(37-88) 90/48     Weight: 93.5 kg (206 lb 2.1 oz)  Body mass index is 28.75 kg/m².      Intake/Output Summary (Last 24 hours) at 1/4/2022 1300  Last data filed at 1/4/2022 0730  Gross per 24 hour   Intake 1444.4 ml   Output 2045 ml   Net -600.6 ml       Physical Exam  Vitals reviewed.   Constitutional:       General: He is not in acute distress.     Appearance: He is ill-appearing.      Comments: Intubated and sedated   HENT:      Head: Normocephalic and atraumatic.      Comments: Right temporal deformity     Right Ear: External ear normal.      Left Ear: External ear normal.      Nose: Nose normal.      Mouth/Throat:      Mouth: Mucous membranes are moist.   Eyes:      Conjunctiva/sclera: Conjunctivae normal.      Pupils: Pupils are equal, round, and reactive to light.   Cardiovascular:      Rate and Rhythm: Normal rate and regular rhythm.      Pulses: Normal pulses.      Heart sounds: Normal heart sounds.   Pulmonary:      Effort: No respiratory distress.      Breath sounds: No stridor. No wheezing, rhonchi or rales.      Comments: Clear breath sounds  anteriorly  Abdominal:      Palpations: Abdomen is soft.      Tenderness: There is no guarding.      Comments: Anterior abdominal wall with wound vac in place. Ostomy   Genitourinary:     Comments: Packing in place  Musculoskeletal:         General: No swelling or deformity.      Cervical back: Neck supple.      Right lower leg: No edema.      Left lower leg: No edema.   Lymphadenopathy:      Cervical: No cervical adenopathy.   Skin:     General: Skin is warm and dry.   Neurological:      Cranial Nerves: No cranial nerve deficit.      Comments: Currently Intubated and sedated         Vents:  Vent Mode: A/C (01/04/22 1200)  Set Rate: 16 BPM (01/04/22 1200)  Vt Set: 480 mL (01/04/22 1200)  Pressure Support: 12 cmH20 (12/29/21 0036)  PEEP/CPAP: 5 cmH20 (01/04/22 1200)  Oxygen Concentration (%): 40 (01/04/22 0449)  Peak Airway Pressure: 19 cmH2O (01/04/22 1200)  Total Ve: 13.4 mL (01/04/22 1200)  F/VT Ratio<105 (RSBI): (!) 65.27 (01/03/22 2345)    Lines/Drains/Airways     Central Venous Catheter Line                 Hemodialysis Catheter 12/30/21 0900 right internal jugular 5 days          Drain                 NG/OG Tube Ocean sump 18 Fr. Left nostril -- days         Urethral Catheter 12/13/21 2130 16 Fr. 21 days         Colostomy 12/18/21 1030 Descending/sigmoid LUQ 17 days          Airway                 Airway - Non-Surgical 12/14/21 1047 21 days          Peripheral Intravenous Line                 Peripheral IV - Single Lumen 12/19/21 2126 20 G Anterior;Right Upper Arm 15 days                Significant Labs:    CBC/Anemia Profile:  Recent Labs   Lab 01/03/22  1045   WBC 17.07*   HGB 7.2*   HCT 21.6*      MCV 89.6   RDW 15.8*        Chemistries:  Recent Labs   Lab 01/03/22  0533 01/04/22  0837   * 131*   K 6.4* 6.2*   CL 98 95*   CO2 9* 16*   * 75*   CREATININE 7.30* 6.10*   CALCIUM 7.1* 6.9*       All pertinent labs within the past 24 hours have been reviewed.    Significant Imaging:  I have  reviewed all pertinent imaging results/findings within the past 24 hours.    Assessment/Plan:     * Denice gangrene  - s/p multiple debridements  - wound vac in place  - ID consulted for antibiotic management: He was treated with rocephin, daptomycin, clindamycin. 12/21- change clindamycin to ampicillin and treat for another week  - 12/28 wound vac changed at bedside   1/1/22 -  To again today  OR today  1/3-- wound vac in place  1/4 back to OR today      Type 2 diabetes mellitus without complication, without long-term current use of insulin  Had some hypoglycemia overnight. Monitor closely.  Nutrition likely held in preparation of surgery. If he continues to have issues with hypoglycemia we will hold levemir    Acute blood loss anemia  - H/H down to 5.5/16 - pt had bleeding from his HD insertion site yesterday this has now resolved.   - will transfuse 2 units PRBCs on HD  1/2- 9.7/27.5 - repeat CBC in AM  1/4- Hgb is 7.2. Not sure if he will be transfused during surgery. IF not we will try to get  Him transfused with next HD    RAHAT (acute kidney injury)  - nephrology consulted   - no sign of renal recovery.  Continuing with scheduled hemodialysis for this patient.  - tunneled HD line placement 12/30  - 12/31 - potassium 6.36 - plan for HD again today  with 2 PRBC  1/2- continue HD per nephrology, labs in AM   1/3- potassium is 6.4. Creatinine is 7.3, CO2 is 9  1/4- Potassium is 6.2. Creatinine is 6.1. Nephrology note reviewed and appreciated. He dialzyed on 1/3. Continue HD per nephrology. The patient remains critically ill      On mechanically assisted ventilation  - 12/14 intubated   - not doing well with PSV trials. Placed back on AC  - > 15 days intubated,  consider trach placement. Holding ticagrelor for possible trach on Monday 1/1/22- plan for trach Monday   1/3- FiO2 down to 40%. Patient is going for trach today  1/4- Was unable to have trach yesterday. Plan to go to OR today    Hypertension  - holding  antihypertensives  - He is currently on pressors        The patient remains critically ill. This note includes approximately 30 minutes of critical care time spent in the management of this patient. This includes review of labs, data, imaging, and vent management.         Cecil Abernathy, DO  Pulmonology  [facility]

## 2022-01-04 NOTE — ASSESSMENT & PLAN NOTE
- H/H down to 5.5/16 - pt had bleeding from his HD insertion site yesterday this has now resolved.   - will transfuse 2 units PRBCs on HD  1/2- 9.7/27.5 - repeat CBC in AM  1/4- Hgb is 7.2. Not sure if he will be transfused during surgery. IF not we will try to get  Him transfused with next HD

## 2022-01-04 NOTE — PLAN OF CARE
Plans of care ongoing  Problem: Adult Inpatient Plan of Care  Goal: Plan of Care Review  Outcome: Ongoing, Progressing  Goal: Patient-Specific Goal (Individualized)  Outcome: Ongoing, Progressing  Goal: Absence of Hospital-Acquired Illness or Injury  Outcome: Ongoing, Progressing  Goal: Optimal Comfort and Wellbeing  Outcome: Ongoing, Progressing  Goal: Readiness for Transition of Care  Outcome: Ongoing, Progressing     Problem: Adjustment to Illness (Sepsis/Septic Shock)  Goal: Optimal Coping  Outcome: Ongoing, Progressing     Problem: Bleeding (Sepsis/Septic Shock)  Goal: Absence of Bleeding  Outcome: Ongoing, Progressing     Problem: Glycemic Control Impaired (Sepsis/Septic Shock)  Goal: Blood Glucose Level Within Desired Range  Outcome: Ongoing, Progressing     Problem: Infection Progression (Sepsis/Septic Shock)  Goal: Absence of Infection Signs and Symptoms  Outcome: Ongoing, Progressing     Problem: Nutrition Impaired (Sepsis/Septic Shock)  Goal: Optimal Nutrition Intake  Outcome: Ongoing, Progressing     Problem: Fluid and Electrolyte Imbalance (Acute Kidney Injury/Impairment)  Goal: Fluid and Electrolyte Balance  Outcome: Ongoing, Progressing     Problem: Oral Intake Inadequate (Acute Kidney Injury/Impairment)  Goal: Optimal Nutrition Intake  Outcome: Ongoing, Progressing     Problem: Renal Function Impairment (Acute Kidney Injury/Impairment)  Goal: Effective Renal Function  Outcome: Ongoing, Progressing     Problem: Infection  Goal: Absence of Infection Signs and Symptoms  Outcome: Ongoing, Progressing     Problem: Fall Injury Risk  Goal: Absence of Fall and Fall-Related Injury  Outcome: Ongoing, Progressing     Problem: Communication Impairment (Mechanical Ventilation, Invasive)  Goal: Effective Communication  Outcome: Ongoing, Progressing     Problem: Device-Related Complication Risk (Mechanical Ventilation, Invasive)  Goal: Optimal Device Function  Outcome: Ongoing, Progressing     Problem: Inability to  Wean (Mechanical Ventilation, Invasive)  Goal: Mechanical Ventilation Liberation  Outcome: Ongoing, Progressing     Problem: Nutrition Impairment (Mechanical Ventilation, Invasive)  Goal: Optimal Nutrition Delivery  Outcome: Ongoing, Progressing     Problem: Skin and Tissue Injury (Mechanical Ventilation, Invasive)  Goal: Absence of Device-Related Skin and Tissue Injury  Outcome: Ongoing, Progressing     Problem: Ventilator-Induced Lung Injury (Mechanical Ventilation, Invasive)  Goal: Absence of Ventilator-Induced Lung Injury  Outcome: Ongoing, Progressing     Problem: Communication Impairment (Artificial Airway)  Goal: Effective Communication  Outcome: Ongoing, Progressing

## 2022-01-04 NOTE — ASSESSMENT & PLAN NOTE
- 12/14 intubated   - not doing well with PSV trials. Placed back on AC  - > 15 days intubated,  consider trach placement. Holding ticagrelor for possible trach on Monday 1/1/22- plan for trach Monday   1/3- FiO2 down to 40%. Patient is going for trach today  1/4- Was unable to have trach yesterday. Plan to go to OR today

## 2022-01-04 NOTE — H&P
Patient was supposed to go yesterday for washout have her potassium was high.  6.4 yesterday.  Got dialysis yesterday and still elevated at 6.2 today.  More urgent patient go for vac change today.  Wound VAC had some possible stool drainage and that was taken off on the midline.  Needs to go to the OR today for washout/exploration.  Will also get a tracheostomy.  Risks and benefits explained.  All questions were answered.

## 2022-01-05 NOTE — PLAN OF CARE
Plans of care ongoing  Problem: Adult Inpatient Plan of Care  Goal: Plan of Care Review  Outcome: Ongoing, Progressing  Goal: Patient-Specific Goal (Individualized)  Outcome: Ongoing, Progressing  Goal: Absence of Hospital-Acquired Illness or Injury  Outcome: Ongoing, Progressing  Goal: Optimal Comfort and Wellbeing  Outcome: Ongoing, Progressing  Goal: Readiness for Transition of Care  Outcome: Ongoing, Progressing     Problem: Adjustment to Illness (Sepsis/Septic Shock)  Goal: Optimal Coping  Outcome: Ongoing, Progressing     Problem: Bleeding (Sepsis/Septic Shock)  Goal: Absence of Bleeding  Outcome: Ongoing, Progressing     Problem: Glycemic Control Impaired (Sepsis/Septic Shock)  Goal: Blood Glucose Level Within Desired Range  Outcome: Ongoing, Progressing     Problem: Infection Progression (Sepsis/Septic Shock)  Goal: Absence of Infection Signs and Symptoms  Outcome: Ongoing, Progressing     Problem: Nutrition Impaired (Sepsis/Septic Shock)  Goal: Optimal Nutrition Intake  Outcome: Ongoing, Progressing     Problem: Fluid and Electrolyte Imbalance (Acute Kidney Injury/Impairment)  Goal: Fluid and Electrolyte Balance  Outcome: Ongoing, Progressing     Problem: Oral Intake Inadequate (Acute Kidney Injury/Impairment)  Goal: Optimal Nutrition Intake  Outcome: Ongoing, Progressing     Problem: Renal Function Impairment (Acute Kidney Injury/Impairment)  Goal: Effective Renal Function  Outcome: Ongoing, Progressing     Problem: Infection  Goal: Absence of Infection Signs and Symptoms  Outcome: Ongoing, Progressing     Problem: Fall Injury Risk  Goal: Absence of Fall and Fall-Related Injury  Outcome: Ongoing, Progressing     Problem: Communication Impairment (Mechanical Ventilation, Invasive)  Goal: Effective Communication  Outcome: Ongoing, Progressing     Problem: Device-Related Complication Risk (Mechanical Ventilation, Invasive)  Goal: Optimal Device Function  Outcome: Ongoing, Progressing     Problem: Inability to  Wean (Mechanical Ventilation, Invasive)  Goal: Mechanical Ventilation Liberation  Outcome: Ongoing, Progressing     Problem: Nutrition Impairment (Mechanical Ventilation, Invasive)  Goal: Optimal Nutrition Delivery  Outcome: Ongoing, Progressing     Problem: Skin and Tissue Injury (Mechanical Ventilation, Invasive)  Goal: Absence of Device-Related Skin and Tissue Injury  Outcome: Ongoing, Progressing     Problem: Ventilator-Induced Lung Injury (Mechanical Ventilation, Invasive)  Goal: Absence of Ventilator-Induced Lung Injury  Outcome: Ongoing, Progressing     Problem: Communication Impairment (Artificial Airway)  Goal: Effective Communication  Outcome: Ongoing, Progressing     Problem: Device-Related Complication Risk (Artificial Airway)  Goal: Optimal Device Function  Outcome: Ongoing, Progressing     Problem: Skin and Tissue Injury (Artificial Airway)  Goal: Absence of Device-Related Skin or Tissue Injury  Outcome: Ongoing, Progressing     Problem: Noninvasive Ventilation Acute  Goal: Effective Unassisted Ventilation and Oxygenation  Outcome: Ongoing, Progressing     Problem: Skin Injury Risk Increased  Goal: Skin Health and Integrity  Outcome: Ongoing, Progressing     Problem: Device-Related Complication Risk (Hemodialysis)  Goal: Safe, Effective Therapy Delivery  Outcome: Ongoing, Progressing     Problem: Hemodynamic Instability (Hemodialysis)  Goal: Effective Tissue Perfusion  Outcome: Ongoing, Progressing     Problem: Infection (Hemodialysis)  Goal: Absence of Infection Signs and Symptoms  Outcome: Ongoing, Progressing     Problem: Diabetes Comorbidity  Goal: Blood Glucose Level Within Targeted Range  Outcome: Ongoing, Progressing     Problem: Impaired Wound Healing  Goal: Optimal Wound Healing  Outcome: Ongoing, Progressing

## 2022-01-05 NOTE — H&P (VIEW-ONLY)
[facility]  General Surgery  Progress Note    Subjective:     Interval History: No issues.    Post-Op Info:  * No surgery found *          Medications:  Continuous Infusions:  Scheduled Meds:   ampicillin-sulbactim (UNASYN) IVPB  1.5 g Intravenous Q12H    pantoprazole  40 mg Per NG tube Daily     PRN Meds:sodium chloride, sodium chloride 0.9%, acetaminophen, albuterol sulfate, dextrose 50%, fentanyl, glucagon (human recombinant), heparin (porcine), insulin aspart U-100, NORepinephrine bitartrate-D5W, propofoL, sodium chloride 0.9%     Objective:     Vital Signs (Most Recent):  Temp: 98.3 °F (36.8 °C) (01/05/22 0400)  Pulse: 107 (01/05/22 0700)  Resp: 17 (01/05/22 0700)  BP: (!) 95/45 (01/05/22 0700)  SpO2: 100 % (01/05/22 0700) Vital Signs (24h Range):  Temp:  [96.4 °F (35.8 °C)-102.1 °F (38.9 °C)] 98.3 °F (36.8 °C)  Pulse:  [] 107  Resp:  [12-35] 17  SpO2:  [100 %] 100 %  BP: ()/(36-84) 95/45       Intake/Output Summary (Last 24 hours) at 1/5/2022 0925  Last data filed at 1/5/2022 0600  Gross per 24 hour   Intake 1705 ml   Output 110 ml   Net 1595 ml       Physical Exam  HENT:      Head: Normocephalic and atraumatic.   Cardiovascular:      Rate and Rhythm: Normal rate.   Pulmonary:      Effort: Pulmonary effort is normal.   Abdominal:      General: Abdomen is flat.      Comments: [place] in lpace   Skin:     General: Skin is warm.   Neurological:      General: No focal deficit present.         Significant Labs:  CBC:   Recent Labs   Lab 01/03/22  1045   WBC 17.07*   RBC 2.41*   HGB 7.2*   HCT 21.6*      MCV 89.6   MCH 29.9   MCHC 33.3     CMP:   Recent Labs   Lab 01/04/22  0837   GLU 63*   CALCIUM 6.9*   *   K 6.2*   CO2 16*   CL 95*   BUN 75*   CREATININE 6.10*       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    Acute blood loss anemia [D62]  12/25/2021 No    Type 2 diabetes mellitus without complication, without long-term current use of insulin [E11.9] 12/25/2021 Yes    Necrotizing fasciitis [M72.6]  Yes    Hypocalcemia [E83.51] 12/16/2021 Yes    RAHAT (acute kidney injury) [N17.9] 12/14/2021 Yes    On mechanically assisted ventilation [Z99.11] 12/14/2021 Not Applicable    Hypertension [I10] 09/02/2021 Yes      Problems Resolved During this Admission:     Picc line, TPN, now with trach can wean from vent.  OR in a few days for washout    Harish Cazares MD  General Surgery  [facility]

## 2022-01-05 NOTE — PLAN OF CARE
Problem: Fall Injury Risk  Goal: Absence of Fall and Fall-Related Injury  Outcome: Ongoing, Progressing     Problem: Infection  Goal: Absence of Infection Signs and Symptoms  Outcome: Ongoing, Progressing     Problem: Ventilator-Induced Lung Injury (Mechanical Ventilation, Invasive)  Goal: Absence of Ventilator-Induced Lung Injury  Outcome: Ongoing, Progressing     Problem: Communication Impairment (Artificial Airway)  Goal: Effective Communication  Outcome: Ongoing, Progressing     Problem: Skin and Tissue Injury (Mechanical Ventilation, Invasive)  Goal: Absence of Device-Related Skin and Tissue Injury  Outcome: Ongoing, Progressing

## 2022-01-05 NOTE — SUBJECTIVE & OBJECTIVE
Interval History: The patient tolerated dialysis today.  He remains critical.    Review of patient's allergies indicates:  No Known Allergies  Current Facility-Administered Medications   Medication Frequency    0.9%  NaCl infusion (for blood administration) Q24H PRN    0.9%  NaCl infusion PRN    acetaminophen tablet 500 mg Q6H PRN    albuterol nebulizer solution 2.5 mg Q4H PRN    ampicillin-sulbactam (UNASYN) 1.5 g in sodium chloride 0.9 % 100 mL IVPB (MB+) Q12H    dextrose 10 % infusion Continuous    dextrose 50% injection 12.5 g PRN    fentaNYL 2500 mcg in D5W 250 mL infusion premix (titrating) (conc: 10 mcg/mL) PRN    glucagon (human recombinant) injection 1 mg PRN    heparin (porcine) injection 4,000 Units PRN    insulin aspart U-100 injection 1-10 Units Q6H PRN    NORepinephrine 32 mg in dextrose 5 % 250 mL infusion PRN    pantoprazole suspension 40 mg Daily    propofol (DIPRIVAN) 10 mg/mL infusion PRN    sodium chloride 0.9% bolus 250 mL PRN       Objective:     Vital Signs (Most Recent):  Temp: 99 °F (37.2 °C) (01/05/22 0958)  Pulse: 103 (01/05/22 1300)  Resp: 18 (01/05/22 1300)  BP: (!) 158/59 (01/05/22 1300)  SpO2: 100 % (01/05/22 1300)  O2 Device (Oxygen Therapy): ventilator (01/05/22 0518) Vital Signs (24h Range):  Temp:  [96.4 °F (35.8 °C)-99.9 °F (37.7 °C)] 99 °F (37.2 °C)  Pulse:  [] 103  Resp:  [12-35] 18  SpO2:  [100 %] 100 %  BP: ()/(28-84) 158/59     Weight: 89.5 kg (197 lb 5 oz) (01/05/22 0600)  Body mass index is 27.52 kg/m².  Body surface area is 2.12 meters squared.    I/O last 3 completed shifts:  In: 2455 [I.V.:1750; NG/GT:350; IV Piggyback:355]  Out: 165 [Urine:165]    Physical Exam  Vitals reviewed.   Constitutional:       Appearance: He is ill-appearing.   Cardiovascular:      Rate and Rhythm: Regular rhythm.   Pulmonary:      Effort: Pulmonary effort is normal.      Comments: Ventilated  Abdominal:      Palpations: Abdomen is soft.         Significant  Labs:  BMP:   Recent Labs   Lab 01/04/22  0837   GLU 63*   *   K 6.2*   CL 95*   CO2 16*   BUN 75*   CREATININE 6.10*   CALCIUM 6.9*     CBC:   Recent Labs   Lab 01/03/22  1045   WBC 17.07*   RBC 2.41*   HGB 7.2*   HCT 21.6*      MCV 89.6   MCH 29.9   MCHC 33.3        Significant Imaging:  Labs: Reviewed

## 2022-01-05 NOTE — ASSESSMENT & PLAN NOTE
- 12/14 intubated   - not doing well with PSV trials. Placed back on AC  - > 15 days intubated,  consider trach placement. Holding ticagrelor for possible trach on Monday 1/1/22- plan for trach Monday   1/3- FiO2 down to 40%. Patient is going for trach today  1/4- Was unable to have trach yesterday. Plan to go to OR today  01/05/2022 check chest x-ray and gases

## 2022-01-05 NOTE — ASSESSMENT & PLAN NOTE
- nephrology consulted   - no sign of renal recovery.  Continuing with scheduled hemodialysis for this patient.  - tunneled HD line placement 12/30  - 12/31 - potassium 6.36 - plan for HD again today  with 2 PRBC  1/2- continue HD per nephrology, labs in AM   1/3- potassium is 6.4. Creatinine is 7.3, CO2 is 9  1/4- Potassium is 6.2. Creatinine is 6.1. Nephrology note reviewed and appreciated. He dialzyed on 1/3. Continue HD per nephrology. The patient remains critically ill  01/05/2022 got dialysis yesterday lab pending

## 2022-01-05 NOTE — CONSULTS
[facility]  Adult Nutrition  Consult Note         Reason for Assessment  Reason For Assessment: consult PPN   Nutrition Risk Screen: tube feeding or parenteral nutrition  Malnutrition  Is Patient Malnourished: No  Skin Integrity  Clayton Risk Assessment  Sensory Perception: (P) 1-->completely limited  Moisture: (P) 4-->rarely moist  Activity: (P) 1-->bedfast  Mobility: (P) 1-->completely immobile  Nutrition: (P) 1-->very poor  Friction and Shear: (P) 1-->problem  Clayton Score: (P) 9  Comments on skin integrity: wounds  Nutrition Diagnosis  Altered GI function   related to Decreased G.I. tract structural integrity as evidenced by SBO with surgery    Nutrition Diagnosis Status: New     Increased protein Needs   related to Decreased/ impaired skin integrity as evidenced by wounds    Nutrition Diagnosis Status: New      Comments: Pt has wounds and had a bowel resection.   Nutrition Risk  Level of Risk/Frequency of Follow-up: high  Comments on nutrition risk: no nutrition currently   Recent Labs   Lab 01/04/22  0837 01/04/22  1056 01/05/22  1222   GLU 63*  --   --    POCGLU  --    < > 71    < > = values in this interval not displayed.     Comments on Glucose: low due to hypoglycemia  Nutrition Prescription / Recommendations  Recommendation/Intervention: Initiate PPN and advance to NG feedings when medically feasible.  Goals: PPN Tolerance, wound healing, Diet advancement  Nutrition Goal Status: new  Communication of CHERIE Recs: reviewed with RN  Current Diet Order: NPO  Chewing or Swallowing Difficulty?: sedated  Recommended Diet: Enteral Nutrition  Recommended Oral Supplement: No Oral Supplements  Is Nutrition Support Recommended: Yes   Parenteral Nutrition  Type: PPN via peripheral access  Formula: clinimix 4.25/5  Goal Rate: 85 ml/hr  Lipids: propofol at 22.8ml/h 67g fat  Parenteral Nutrition Formula Provides  1295 kcals  86.7 g Protein  102 g Dextrose Carbohydrates  67 g Fat  2040 ml Total  Fluid    Is Education Recommended: No  Monitor and Evaluation  % current Intake: NPO  % intake to meet estimated needs: Parenteral Nutrition  Food and Nutrient Intake: parenteral nutrition intake,enteral nutrition intake,energy intake  Food and Nutrient Adminstration: enteral and parenteral nutrition administration  Anthropometric Measurements: weight change  Biochemical Data, Medical Tests and Procedures: glucose/endocrine profile,electrolyte and renal panel,gastrointestinal profile  Nutrition-Focused Physical Findings: overall appearance,skin  Enteral Calories (kcal): 2160  Enteral Protein (gm): 96  Enteral (Free Water) Fluid (mL): 864  Free Water Flush Fluid (mL): 900  Other Calories (kcal): 507  Total Calories (kcal): 2271  Total Fluid Intake (mL): 1764  Energy Calories Required: not meeting needs  Protein Required: not meeting needs  Fluid Required: not meeting needs  Tolerance: not tolerating  Current Medical Diagnosis and Past Medical History  Diagnosis: gastrointestinal disease,infection/sepsis  Past Medical History:   Diagnosis Date    Hyperlipidemia     Hypertension      Nutrition/Diet History  Spiritual, Cultural Beliefs, Christianity Practices, Values that Affect Care: no  Food Allergies: NKFA  Factors Affecting Nutritional Intake: None identified at this time  Lab/Procedures/Meds  Recent Labs   Lab 01/04/22  0837 01/04/22  1056 01/05/22  1222   *  --   --    K 6.2*  --   --    BUN 75*  --   --    CREATININE 6.10*  --   --    GLU 63*  --   --    POCGLU  --    < > 71   CALCIUM 6.9*  --   --    CL 95*  --   --     < > = values in this interval not displayed.     Last A1c: No results found for: HGBA1C  Lab Results   Component Value Date    RBC 2.41 (L) 01/03/2022    HGB 7.2 (L) 01/03/2022    HCT 21.6 (L) 01/03/2022    MCV 89.6 01/03/2022    MCH 29.9 01/03/2022    MCHC 33.3 01/03/2022     Pertinent Labs Reviewed: reviewed  Pertinent Labs Comments: Hct 27.5, Na 130, K 6.4, Bun 117, Cr 7.30, Glu 65, Alb  "1.0  Pertinent Medications Reviewed: reviewed  Pertinent Medications Comments: heparin, insulin  Anthropometrics  Temp: 99 °F (37.2 °C)  Height Method: Stated  Height: 5' 11" (180.3 cm)  Height (inches): 71 in  Weight Method: Bed Scale  Weight: 90 kg (198 lb 6.6 oz)  Weight (lb): 198.42 lb  Ideal Body Weight (IBW), Male: 172 lb  % Ideal Body Weight, Male (lb): 115.36 %  BMI (Calculated): 27.7  BMI Grade: 25 - 29.9 - overweight     Estimated/Assessed Needs  RMR (Cameron-St. Jeor Equation): 1702.13 Activity Factor: 1.2 Injury Factor: 1   Total Ve: 5.5 mL Temp: 99 °F (37.2 °C)Axillary  Weight Used For Calorie Calculations: 90 kg (198 lb 6.6 oz)   Energy Need Method: Cameron-St Jeor Energy Calorie Requirements (kcal): 2808  Weight Used For Protein Calculations: 90 kg (198 lb 6.6 oz)  Protein Requirements:   Estimated Fluid Requirement Method: RDA Method Fluid Requirements (mL): 2340  RDA Method (mL): 2808     Nutrition by Nursing  Diet/Nutrition Received: NPO     Diet/Feeding Assistance: total feed  Diet/Feeding Tolerance: other (see comments) (npo/)  Last Bowel Movement: (P) 01/05/22       NG/OG Tube [place] sump 18 Fr. Left nostril-Feeding Type: continuous       NG/OG Tube [place] sump 18 Fr. Left nostril-Current Rate (mL/hr): 50 mL/hr       NG/OG Tube [place] sump 18 Fr. Left nostril-Goal Rate (mL/hr): 65 mL/hr       NG/OG Tube [place] sump 18 Fr. Left nostril-Formula Name:  (.)  Nutrition Follow-Up  RD Follow-up?: Yes  Assessment and Plan  No new Assessment & Plan notes have been filed under this hospital service since the last note was generated.  Service: Nutrition     "

## 2022-01-05 NOTE — PROGRESS NOTES
[facility]  Nephrology  Progress Note    Patient Name: Og Garcia  MRN: [id number]  Admission Date: 12/23/2021  Hospital Length of Stay: 13 days  Attending Provider: Cecil Abernathy DO   Primary Care Physician: Hector Arndt DNP, FNP-C  Principal Problem:Denice gangrene    Subjective:     HPI: The patient is known from the previous nephrology consult at [facility].  He is no at Specialty and continues to need dialysis support.      Interval History: The patient tolerated dialysis today.  He remains critical.    Review of patient's allergies indicates:  No Known Allergies  Current Facility-Administered Medications   Medication Frequency    0.9%  NaCl infusion (for blood administration) Q24H PRN    0.9%  NaCl infusion PRN    acetaminophen tablet 500 mg Q6H PRN    albuterol nebulizer solution 2.5 mg Q4H PRN    ampicillin-sulbactam (UNASYN) 1.5 g in sodium chloride 0.9 % 100 mL IVPB (MB+) Q12H    dextrose 10 % infusion Continuous    dextrose 50% injection 12.5 g PRN    fentaNYL 2500 mcg in D5W 250 mL infusion premix (titrating) (conc: 10 mcg/mL) PRN    glucagon (human recombinant) injection 1 mg PRN    heparin (porcine) injection 4,000 Units PRN    insulin aspart U-100 injection 1-10 Units Q6H PRN    NORepinephrine 32 mg in dextrose 5 % 250 mL infusion PRN    pantoprazole suspension 40 mg Daily    propofol (DIPRIVAN) 10 mg/mL infusion PRN    sodium chloride 0.9% bolus 250 mL PRN       Objective:     Vital Signs (Most Recent):  Temp: 99 °F (37.2 °C) (01/05/22 0958)  Pulse: 103 (01/05/22 1300)  Resp: 18 (01/05/22 1300)  BP: (!) 158/59 (01/05/22 1300)  SpO2: 100 % (01/05/22 1300)  O2 Device (Oxygen Therapy): ventilator (01/05/22 0518) Vital Signs (24h Range):  Temp:  [96.4 °F (35.8 °C)-99.9 °F (37.7 °C)] 99 °F (37.2 °C)  Pulse:  [] 103  Resp:  [12-35] 18  SpO2:  [100 %] 100 %  BP: ()/(28-84) 158/59     Weight: 89.5 kg (197 lb 5 oz) (01/05/22 0600)  Body mass index  is 27.52 kg/m².  Body surface area is 2.12 meters squared.    I/O last 3 completed shifts:  In: 2455 [I.V.:1750; NG/GT:350; IV Piggyback:355]  Out: 165 [Urine:165]    Physical Exam  Vitals reviewed.   Constitutional:       Appearance: He is ill-appearing.   Cardiovascular:      Rate and Rhythm: Regular rhythm.   Pulmonary:      Effort: Pulmonary effort is normal.      Comments: Ventilated  Abdominal:      Palpations: Abdomen is soft.         Significant Labs:  BMP:   Recent Labs   Lab 01/04/22  0837   GLU 63*   *   K 6.2*   CL 95*   CO2 16*   BUN 75*   CREATININE 6.10*   CALCIUM 6.9*     CBC:   Recent Labs   Lab 01/03/22  1045   WBC 17.07*   RBC 2.41*   HGB 7.2*   HCT 21.6*      MCV 89.6   MCH 29.9   MCHC 33.3        Significant Imaging:  Labs: Reviewed    Assessment/Plan:     * Denice gangrene  Continue wound care  1/5/2022.  Continue with wound debridement    RAHAT (acute kidney injury)  Dialysis Monday   Continue with scheduled hemodialysis for this patient.  1/5/2022 Dialysis done today.  No other changes    On mechanically assisted ventilation  1. Wean as tolerated  2.  Plan for trach      Continue with scheduled hemodialysis for this patient.  Thank you for your consult. I will follow-up with patient. Please contact us if you have any additional questions.    Edwin Pryor Jr, MD  Nephrology  [facility]

## 2022-01-05 NOTE — PROGRESS NOTES
Wound care note;  Patient skin was evaluated today  Patient in bed, eyes closed, remains on ventilator.  Abdominal wound with NPWT intact, suctioning at 125 mm hg.  Sacral/rectal wound with tan slough noted, scattered areas of pink tissue. Has undermining at 1 to 3 clock , unable to measure due to wound extends toward abdominal wound.   Packed with kerlix moistened with Dakins slon. No odor noted, Slight green discolored on old packing noted.   Cont dakins kerlix daily   Cont turn protocol  Waffle boots  Low air loss mattress

## 2022-01-05 NOTE — ASSESSMENT & PLAN NOTE
- H/H down to 5.5/16 - pt had bleeding from his HD insertion site yesterday this has now resolved.   - will transfuse 2 units PRBCs on HD  1/2- 9.7/27.5 - repeat CBC in AM  1/4- Hgb is 7.2. Not sure if he will be transfused during surgery. IF not we will try to get  Him transfused with next HD  01/05/2022 waiting lab

## 2022-01-05 NOTE — PROGRESS NOTES
[facility]  Pulmonology  Progress Note    Patient Name: Og Garcia  MRN: [id number]  Admission Date: 12/23/2021  Hospital Length of Stay: 13 days  Code Status: Prior  Attending Provider: Cecil Abernathy DO  Primary Care Provider: Hector Arndt DNP, FNP-C   Principal Problem: Denice gangrene    Subjective:     Interval History:  Sedated    Objective:     Vital Signs (Most Recent):  Temp: 98.3 °F (36.8 °C) (01/05/22 0400)  Pulse: 103 (01/05/22 0345)  Resp: (!) 35 (01/05/22 0345)  BP: (!) 97/52 (01/05/22 0345)  SpO2: 100 % (01/05/22 0345) Vital Signs (24h Range):  Temp:  [96.4 °F (35.8 °C)-102.1 °F (38.9 °C)] 98.3 °F (36.8 °C)  Pulse:  [] 103  Resp:  [12-36] 35  SpO2:  [100 %] 100 %  BP: ()/(36-84) 97/52     Weight: 89.5 kg (197 lb 5 oz)  Body mass index is 27.52 kg/m².      Intake/Output Summary (Last 24 hours) at 1/5/2022 0634  Last data filed at 1/5/2022 0429  Gross per 24 hour   Intake 1805 ml   Output 115 ml   Net 1690 ml       Physical Exam  Vitals reviewed.   Constitutional:       Appearance: Normal appearance. He is underweight.      Interventions: He is sedated and intubated.   HENT:      Head: Normocephalic and atraumatic.      Nose: Nose normal.      Mouth/Throat:      Mouth: Mucous membranes are dry.      Pharynx: Oropharynx is clear.   Eyes:      Extraocular Movements: Extraocular movements intact.      Conjunctiva/sclera: Conjunctivae normal.      Pupils: Pupils are equal, round, and reactive to light.   Cardiovascular:      Rate and Rhythm: Normal rate.      Heart sounds: Normal heart sounds. No murmur heard.      Pulmonary:      Effort: Pulmonary effort is normal. He is intubated.      Breath sounds: Normal breath sounds.   Abdominal:      General: Abdomen is flat. Bowel sounds are normal.      Palpations: Abdomen is soft.   Musculoskeletal:         General: Normal range of motion.      Cervical back: Normal range of motion and neck supple.       Right lower leg: No edema.      Left lower leg: No edema.   Skin:     General: Skin is warm and dry.      Capillary Refill: Capillary refill takes less than 2 seconds.   Neurological:      General: No focal deficit present.      Mental Status: He is alert and oriented to person, place, and time.   Psychiatric:         Mood and Affect: Mood normal.         Behavior: Behavior normal.         Vents:  Vent Mode: A/C (01/05/22 0518)  Set Rate: 16 BPM (01/05/22 0518)  Vt Set: 480 mL (01/05/22 0518)  Pressure Support: 12 cmH20 (12/29/21 0036)  PEEP/CPAP: 5 cmH20 (01/05/22 0518)  Oxygen Concentration (%): 40 (01/05/22 0000)  Peak Airway Pressure: 19 cmH2O (01/05/22 0518)  Total Ve: 13.7 mL (01/05/22 0015)  F/VT Ratio<105 (RSBI): (!) 62.97 (01/05/22 0015)    Lines/Drains/Airways     Central Venous Catheter Line                 Hemodialysis Catheter 12/30/21 0900 right internal jugular 5 days          Drain                 NG/OG Tube [place] sump 18 Fr. Left nostril -- days         Colostomy 12/18/21 1030 Descending/sigmoid LUQ 17 days          Peripheral Intravenous Line                 Peripheral IV - Single Lumen 12/19/21 2126 20 G Anterior;Right Upper Arm 16 days                Significant Labs:    CBC/Anemia Profile:  Recent Labs   Lab 01/03/22  1045   WBC 17.07*   HGB 7.2*   HCT 21.6*      MCV 89.6   RDW 15.8*        Chemistries:  Recent Labs   Lab 01/04/22  0837   *   K 6.2*   CL 95*   CO2 16*   BUN 75*   CREATININE 6.10*   CALCIUM 6.9*       All pertinent labs within the past 24 hours have been reviewed.    Significant Imaging:  I have reviewed all pertinent imaging results/findings within the past 24 hours.    Assessment/Plan:     * Denice gangrene  - s/p multiple debridements  - wound vac in place  - ID consulted for antibiotic management: He was treated with rocephin, daptomycin, clindamycin. 12/21- change clindamycin to ampicillin and treat for another week  - 12/28 wound vac changed at bedside    1/1/22 -  To again today  OR today  1/3-- wound vac in place  1/4 back to OR today  01/05/2022 patient was febrile before going back to surgery yesterday when re-culture in no change antibiotics consider anti fungal therapy if temp continues lab work pending      On mechanically assisted ventilation  - 12/14 intubated   - not doing well with PSV trials. Placed back on AC  - > 15 days intubated,  consider trach placement. Holding ticagrelor for possible trach on Monday 1/1/22- plan for trach Monday   1/3- FiO2 down to 40%. Patient is going for trach today  1/4- Was unable to have trach yesterday. Plan to go to OR today  01/05/2022 check chest x-ray and gases    RAHAT (acute kidney injury)  - nephrology consulted   - no sign of renal recovery.  Continuing with scheduled hemodialysis for this patient.  - tunneled HD line placement 12/30  - 12/31 - potassium 6.36 - plan for HD again today  with 2 PRBC  1/2- continue HD per nephrology, labs in AM   1/3- potassium is 6.4. Creatinine is 7.3, CO2 is 9  1/4- Potassium is 6.2. Creatinine is 6.1. Nephrology note reviewed and appreciated. He dialzyed on 1/3. Continue HD per nephrology. The patient remains critically ill  01/05/2022 got dialysis yesterday lab pending      Hypertension  - holding antihypertensives  - He is currently on pressors    Type 2 diabetes mellitus without complication, without long-term current use of insulin  Continues to be hypoglycemic we stopped his Levemir got him on D50 will continue watch carefully    Acute blood loss anemia  - H/H down to 5.5/16 - pt had bleeding from his HD insertion site yesterday this has now resolved.   - will transfuse 2 units PRBCs on HD  1/2- 9.7/27.5 - repeat CBC in AM  1/4- Hgb is 7.2. Not sure if he will be transfused during surgery. IF not we will try to get  Him transfused with next HD  01/05/2022 waiting lab                 Jose Urban MD  Pulmonology  [facility]

## 2022-01-05 NOTE — SUBJECTIVE & OBJECTIVE
Interval History: The patient is s/p small bowel resection today.  He remains on pressors.    Review of patient's allergies indicates:  No Known Allergies  Current Facility-Administered Medications   Medication Frequency    0.9%  NaCl infusion (for blood administration) Q24H PRN    0.9%  NaCl infusion PRN    acetaminophen tablet 500 mg Q6H PRN    albuterol nebulizer solution 2.5 mg Q4H PRN    ampicillin-sulbactam (UNASYN) 1.5 g in sodium chloride 0.9 % 100 mL IVPB (MB+) Q12H    dextrose 10 % infusion Continuous    dextrose 50% injection 12.5 g PRN    fentaNYL 2500 mcg in D5W 250 mL infusion premix (titrating) (conc: 10 mcg/mL) PRN    glucagon (human recombinant) injection 1 mg PRN    heparin (porcine) injection 4,000 Units PRN    insulin aspart U-100 injection 1-10 Units Q6H PRN    insulin detemir U-100 injection 45 Units QHS    NORepinephrine 32 mg in dextrose 5 % 250 mL infusion PRN    pantoprazole suspension 40 mg Daily    propofol (DIPRIVAN) 10 mg/mL infusion PRN    sodium chloride 0.9% bolus 250 mL PRN       Objective:     Vital Signs (Most Recent):  Temp: 98.8 °F (37.1 °C) (01/04/22 1735)  Pulse: (!) 115 (01/04/22 1745)  Resp: (!) 23 (01/04/22 1745)  BP: (!) 142/74 (01/04/22 1745)  SpO2: 100 % (01/04/22 1745)  O2 Device (Oxygen Therapy): ventilator (01/04/22 1735) Vital Signs (24h Range):  Temp:  [98.8 °F (37.1 °C)-102.1 °F (38.9 °C)] 98.8 °F (37.1 °C)  Pulse:  [] 115  Resp:  [12-36] 23  SpO2:  [100 %] 100 %  BP: ()/(36-84) 142/74     Weight: 93.5 kg (206 lb 2.1 oz) (01/04/22 0400)  Body mass index is 28.75 kg/m².  Body surface area is 2.16 meters squared.    I/O last 3 completed shifts:  In: 1644.4 [I.V.:494.4; NG/GT:950; IV Piggyback:200]  Out: 2400 [Urine:100; Other:1800; Stool:500]    Physical Exam  Vitals reviewed.   Cardiovascular:      Rate and Rhythm: Regular rhythm.   Pulmonary:      Comments: ventilated  Abdominal:      Palpations: Abdomen is soft.         Significant  Labs:  BMP:   Recent Labs   Lab 01/04/22  0837   GLU 63*   *   K 6.2*   CL 95*   CO2 16*   BUN 75*   CREATININE 6.10*   CALCIUM 6.9*     CBC:   Recent Labs   Lab 01/03/22  1045   WBC 17.07*   RBC 2.41*   HGB 7.2*   HCT 21.6*      MCV 89.6   MCH 29.9   MCHC 33.3        Significant Imaging:  Labs: Reviewed

## 2022-01-05 NOTE — NURSING
Dextrose 50% 12.5 g given iv for accucheck glucose of 66 .. will monitor  bs     0050:  accucheck bs done .. results 128 .. dextrose effective

## 2022-01-05 NOTE — ASSESSMENT & PLAN NOTE
Dialysis Monday   Continue with scheduled hemodialysis for this patient.  1/5/2022 Dialysis done today.  No other changes

## 2022-01-05 NOTE — PLAN OF CARE
Per notes, Pt went back for surgery yesterday for trach, Remains on vent. CXR and labs today. SS following.

## 2022-01-05 NOTE — ASSESSMENT & PLAN NOTE
- s/p multiple debridements  - wound vac in place  - ID consulted for antibiotic management: He was treated with rocephin, daptomycin, clindamycin. 12/21- change clindamycin to ampicillin and treat for another week  - 12/28 wound vac changed at bedside   1/1/22 -  To again today  OR today  1/3-- wound vac in place  1/4 back to OR today  01/05/2022 patient was febrile before going back to surgery yesterday when re-culture in no change antibiotics consider anti fungal therapy if temp continues lab work pending

## 2022-01-05 NOTE — SUBJECTIVE & OBJECTIVE
Interval History:  Sedated    Objective:     Vital Signs (Most Recent):  Temp: 98.3 °F (36.8 °C) (01/05/22 0400)  Pulse: 103 (01/05/22 0345)  Resp: (!) 35 (01/05/22 0345)  BP: (!) 97/52 (01/05/22 0345)  SpO2: 100 % (01/05/22 0345) Vital Signs (24h Range):  Temp:  [96.4 °F (35.8 °C)-102.1 °F (38.9 °C)] 98.3 °F (36.8 °C)  Pulse:  [] 103  Resp:  [12-36] 35  SpO2:  [100 %] 100 %  BP: ()/(36-84) 97/52     Weight: 89.5 kg (197 lb 5 oz)  Body mass index is 27.52 kg/m².      Intake/Output Summary (Last 24 hours) at 1/5/2022 0634  Last data filed at 1/5/2022 0429  Gross per 24 hour   Intake 1805 ml   Output 115 ml   Net 1690 ml       Physical Exam  Vitals reviewed.   Constitutional:       Appearance: Normal appearance. He is underweight.      Interventions: He is sedated and intubated.   HENT:      Head: Normocephalic and atraumatic.      Nose: Nose normal.      Mouth/Throat:      Mouth: Mucous membranes are dry.      Pharynx: Oropharynx is clear.   Eyes:      Extraocular Movements: Extraocular movements intact.      Conjunctiva/sclera: Conjunctivae normal.      Pupils: Pupils are equal, round, and reactive to light.   Cardiovascular:      Rate and Rhythm: Normal rate.      Heart sounds: Normal heart sounds. No murmur heard.      Pulmonary:      Effort: Pulmonary effort is normal. He is intubated.      Breath sounds: Normal breath sounds.   Abdominal:      General: Abdomen is flat. Bowel sounds are normal.      Palpations: Abdomen is soft.   Musculoskeletal:         General: Normal range of motion.      Cervical back: Normal range of motion and neck supple.      Right lower leg: No edema.      Left lower leg: No edema.   Skin:     General: Skin is warm and dry.      Capillary Refill: Capillary refill takes less than 2 seconds.   Neurological:      General: No focal deficit present.      Mental Status: He is alert and oriented to person, place, and time.   Psychiatric:         Mood and Affect: Mood normal.          Behavior: Behavior normal.         Vents:  Vent Mode: A/C (01/05/22 0518)  Set Rate: 16 BPM (01/05/22 0518)  Vt Set: 480 mL (01/05/22 0518)  Pressure Support: 12 cmH20 (12/29/21 0036)  PEEP/CPAP: 5 cmH20 (01/05/22 0518)  Oxygen Concentration (%): 40 (01/05/22 0000)  Peak Airway Pressure: 19 cmH2O (01/05/22 0518)  Total Ve: 13.7 mL (01/05/22 0015)  F/VT Ratio<105 (RSBI): (!) 62.97 (01/05/22 0015)    Lines/Drains/Airways     Central Venous Catheter Line                 Hemodialysis Catheter 12/30/21 0900 right internal jugular 5 days          Drain                 NG/OG Tube [place] sump 18 Fr. Left nostril -- days         Colostomy 12/18/21 1030 Descending/sigmoid LUQ 17 days          Peripheral Intravenous Line                 Peripheral IV - Single Lumen 12/19/21 2126 20 G Anterior;Right Upper Arm 16 days                Significant Labs:    CBC/Anemia Profile:  Recent Labs   Lab 01/03/22  1045   WBC 17.07*   HGB 7.2*   HCT 21.6*      MCV 89.6   RDW 15.8*        Chemistries:  Recent Labs   Lab 01/04/22  0837   *   K 6.2*   CL 95*   CO2 16*   BUN 75*   CREATININE 6.10*   CALCIUM 6.9*       All pertinent labs within the past 24 hours have been reviewed.    Significant Imaging:  I have reviewed all pertinent imaging results/findings within the past 24 hours.

## 2022-01-05 NOTE — PROCEDURES
"Og Garcia is a 66 y.o. male patient.    Temp: 99 °F (37.2 °C) (01/05/22 0958)  Pulse: 103 (01/05/22 1300)  Resp: 18 (01/05/22 1300)  BP: (!) 158/59 (01/05/22 1300)  SpO2: 100 % (01/05/22 1300)  Weight: 89.5 kg (197 lb 5 oz) (01/05/22 0600)  Height: 5' 11" (180.3 cm) (01/03/22 0911)       PICC    Date/Time: 1/5/2022 1:44 PM  Performed by: KATHY Irwin  Authorized by: KATHY Irwin     Location procedure was performed:  [facility]  Consent Done ?:  Yes  Indications:  Vascular access  Anesthesia:  Local infiltration  Local anesthetic:  Lidocaine 1% without epinephrine  Anesthetic total (ml):  2  Preparation:  Skin prepped with ChloraPrep  Location:  Left basilic  Catheter size:  7 Fr  Inserted Catheter Length (cm):  43  Ultrasound guidance: Yes    Vessel Caliber:  Large  Needle advanced into vessel with real time ultrasound guidance.    Steril sheath on probe.    Sterile gel used.  Manometry: No    Securement:  Line sutured and sterile dressing applied  Technical Procedures Used:  Seldinger   Estimated blood loss (mL):  2  XRay:  Placement verified by x-ray  Other Complications:  Patient on vent, needs access for TPN PCXR pending   Patient on vent, needs access for TPN PCXR pending        1/5/2022  "

## 2022-01-05 NOTE — PROGRESS NOTES
[facility]  Nephrology  Progress Note    Patient Name: Og Garcia  MRN: [id number]  Admission Date: 12/23/2021  Hospital Length of Stay: 12 days  Attending Provider: Cecil Abernathy DO   Primary Care Physician: Hector Arndt DNP, FNP-C  Principal Problem:Denice gangrene    Subjective:     HPI: The patient is known from the previous nephrology consult at [facility].  He is no at Specialty and continues to need dialysis support.      Interval History: The patient is s/p small bowel resection today.  He remains on pressors.    Review of patient's allergies indicates:  No Known Allergies  Current Facility-Administered Medications   Medication Frequency    0.9%  NaCl infusion (for blood administration) Q24H PRN    0.9%  NaCl infusion PRN    acetaminophen tablet 500 mg Q6H PRN    albuterol nebulizer solution 2.5 mg Q4H PRN    ampicillin-sulbactam (UNASYN) 1.5 g in sodium chloride 0.9 % 100 mL IVPB (MB+) Q12H    dextrose 10 % infusion Continuous    dextrose 50% injection 12.5 g PRN    fentaNYL 2500 mcg in D5W 250 mL infusion premix (titrating) (conc: 10 mcg/mL) PRN    glucagon (human recombinant) injection 1 mg PRN    heparin (porcine) injection 4,000 Units PRN    insulin aspart U-100 injection 1-10 Units Q6H PRN    insulin detemir U-100 injection 45 Units QHS    NORepinephrine 32 mg in dextrose 5 % 250 mL infusion PRN    pantoprazole suspension 40 mg Daily    propofol (DIPRIVAN) 10 mg/mL infusion PRN    sodium chloride 0.9% bolus 250 mL PRN       Objective:     Vital Signs (Most Recent):  Temp: 98.8 °F (37.1 °C) (01/04/22 1735)  Pulse: (!) 115 (01/04/22 1745)  Resp: (!) 23 (01/04/22 1745)  BP: (!) 142/74 (01/04/22 1745)  SpO2: 100 % (01/04/22 1745)  O2 Device (Oxygen Therapy): ventilator (01/04/22 1735) Vital Signs (24h Range):  Temp:  [98.8 °F (37.1 °C)-102.1 °F (38.9 °C)] 98.8 °F (37.1 °C)  Pulse:  [] 115  Resp:  [12-36] 23  SpO2:  [100 %] 100 %  BP: ()/(36-84)  142/74     Weight: 93.5 kg (206 lb 2.1 oz) (01/04/22 0400)  Body mass index is 28.75 kg/m².  Body surface area is 2.16 meters squared.    I/O last 3 completed shifts:  In: 1644.4 [I.V.:494.4; NG/GT:950; IV Piggyback:200]  Out: 2400 [Urine:100; Other:1800; Stool:500]    Physical Exam  Vitals reviewed.   Cardiovascular:      Rate and Rhythm: Regular rhythm.   Pulmonary:      Comments: ventilated  Abdominal:      Palpations: Abdomen is soft.         Significant Labs:  BMP:   Recent Labs   Lab 01/04/22  0837   GLU 63*   *   K 6.2*   CL 95*   CO2 16*   BUN 75*   CREATININE 6.10*   CALCIUM 6.9*     CBC:   Recent Labs   Lab 01/03/22  1045   WBC 17.07*   RBC 2.41*   HGB 7.2*   HCT 21.6*      MCV 89.6   MCH 29.9   MCHC 33.3        Significant Imaging:  Labs: Reviewed    Assessment/Plan:     * Denice gangrene  Continue wound care    RAHAT (acute kidney injury)  Dialysis Monday   Continue with scheduled hemodialysis for this patient.      Dialysis on MWF schedule  Thank you for your consult. I will follow-up with patient. Please contact us if you have any additional questions.    Edwin Pryor Jr, MD  Nephrology  [facility]

## 2022-01-06 NOTE — ASSESSMENT & PLAN NOTE
Dialysis Monday   Continue with scheduled hemodialysis for this patient.  1/5/2022 Dialysis done today.  No other changes  1/6/2022 Continue with dialysis on tomorrow.

## 2022-01-06 NOTE — ASSESSMENT & PLAN NOTE
- nephrology consulted   - no sign of renal recovery.  Continuing with scheduled hemodialysis for this patient.  - tunneled HD line placement 12/30  - 12/31 - potassium 6.36 - plan for HD again today  with 2 PRBC  1/2- continue HD per nephrology, labs in AM   1/3- potassium is 6.4. Creatinine is 7.3, CO2 is 9  1/4- Potassium is 6.2. Creatinine is 6.1. Nephrology note reviewed and appreciated. He dialzyed on 1/3. Continue HD per nephrology. The patient remains critically ill  Patient receiving regular dialysis has a metabolic acidosis due to his renal failure, CO2 is been about 16 lab pending today

## 2022-01-06 NOTE — ASSESSMENT & PLAN NOTE
Continue wound care  1/5/2022.  Continue with wound debridement  1/6/2022 Plans for further wound debridement

## 2022-01-06 NOTE — PROGRESS NOTES
[facility]  Pulmonology  Progress Note    Patient Name: Og Garcia  MRN: [id number]  Admission Date: 12/23/2021  Hospital Length of Stay: 14 days  Code Status: Prior  Attending Provider: Cecil Abernathy DO  Primary Care Provider: Hector Arndt DNP, FNP-C   Principal Problem: Denice gangrene    Subjective:     Interval History:  Patient is sedated    Objective:     Vital Signs (Most Recent):  Temp: 98.4 °F (36.9 °C) (01/06/22 0000)  Pulse: 102 (01/06/22 0200)  Resp: 18 (01/06/22 0200)  BP: (!) 149/57 (01/06/22 0200)  SpO2: 100 % (01/06/22 0200) Vital Signs (24h Range):  Temp:  [98.4 °F (36.9 °C)-99.9 °F (37.7 °C)] 98.4 °F (36.9 °C)  Pulse:  [] 102  Resp:  [15-26] 18  SpO2:  [100 %] 100 %  BP: ()/(23-59) 149/57     Weight: 90 kg (198 lb 6.6 oz)  Body mass index is 27.67 kg/m².      Intake/Output Summary (Last 24 hours) at 1/6/2022 0653  Last data filed at 1/6/2022 0347  Gross per 24 hour   Intake 1850.5 ml   Output 500 ml   Net 1350.5 ml       Physical Exam  Vitals reviewed.   Constitutional:       Appearance: Normal appearance. He is ill-appearing.      Interventions: He is sedated and intubated.   HENT:      Head: Normocephalic and atraumatic.      Nose: Nose normal.      Mouth/Throat:      Mouth: Mucous membranes are dry.      Pharynx: Oropharynx is clear.   Eyes:      Extraocular Movements: Extraocular movements intact.      Conjunctiva/sclera: Conjunctivae normal.      Pupils: Pupils are equal, round, and reactive to light.   Cardiovascular:      Rate and Rhythm: Normal rate.      Heart sounds: Normal heart sounds. No murmur heard.      Pulmonary:      Effort: Pulmonary effort is normal. He is intubated.      Breath sounds: Normal breath sounds.   Abdominal:      General: Abdomen is flat. Bowel sounds are normal.      Palpations: Abdomen is soft.   Musculoskeletal:         General: Normal range of motion.      Cervical back: Normal range of motion and neck supple.       Right lower leg: No edema.      Left lower leg: No edema.   Skin:     General: Skin is warm and dry.      Capillary Refill: Capillary refill takes less than 2 seconds.   Neurological:      General: No focal deficit present.      Mental Status: He is alert.         Vents:  Vent Mode: A/C (01/06/22 0445)  Set Rate: 16 BPM (01/06/22 0445)  Vt Set: 450 mL (01/06/22 0445)  Pressure Support: 12 cmH20 (12/29/21 0036)  PEEP/CPAP: 5 cmH20 (01/06/22 0445)  Oxygen Concentration (%): 40 (01/06/22 0400)  Peak Airway Pressure: 17 cmH2O (01/06/22 0445)  Total Ve: 5.9 mL (01/06/22 0445)  F/VT Ratio<105 (RSBI): (!) 52.46 (01/05/22 2315)    Lines/Drains/Airways     Peripherally Inserted Central Catheter Line            PICC Double Lumen 01/05/22 left basilic 1 day          Central Venous Catheter Line                 Hemodialysis Catheter 12/30/21 0900 right internal jugular 6 days          Drain                 NG/OG Tube Duval sump 18 Fr. Left nostril -- days         Colostomy 12/18/21 1030 Descending/sigmoid LUQ 18 days          Airway                 Surgical Airway 01/04/22 Shiley Cuffed 2 days          Peripheral Intravenous Line                 Peripheral IV - Single Lumen 01/05/22 0800 22 G Distal;Posterior;Right Hand <1 day                Significant Labs:    CBC/Anemia Profile:  Recent Labs   Lab 01/05/22  1744   WBC 15.39*   HGB 6.8*   HCT 19.8*      MCV 86.5   RDW 15.3*        Chemistries:  Recent Labs   Lab 01/04/22  0837   *   K 6.2*   CL 95*   CO2 16*   BUN 75*   CREATININE 6.10*   CALCIUM 6.9*       All pertinent labs within the past 24 hours have been reviewed.    Significant Imaging:  I have reviewed all pertinent imaging results/findings within the past 24 hours.    Assessment/Plan:     * Denice gangrene  - s/p multiple debridements  - wound vac in place  - ID consulted for antibiotic management: He was treated with rocephin, daptomycin, clindamycin. 12/21- change clindamycin to ampicillin and  treat for another week  - 12/28 wound vac changed at bedside   1/1/22 -  To again today  OR today  1/3-- wound vac in place  1/4 back to OR today  01/05/2022 patient was febrile before going back to surgery yesterday when re-culture in no change antibiotics consider anti fungal therapy if temp continues lab work pending, continue Unasyn      On mechanically assisted ventilation  - 12/14 intubated   - not doing well with PSV trials. Placed back on AC  - > 15 days intubated,  consider trach placement. Holding ticagrelor for possible trach on Monday 1/1/22- plan for trach Monday   1/3- FiO2 down to 40%. Patient is going for trach today  1/4- Was unable to have trach yesterday. Plan to go to OR today  01/05/2022 x-ray shows new infiltrate arterial blood gases look okay continue increase weaning as tolerated    RAHAT (acute kidney injury)  - nephrology consulted   - no sign of renal recovery.  Continuing with scheduled hemodialysis for this patient.  - tunneled HD line placement 12/30  - 12/31 - potassium 6.36 - plan for HD again today  with 2 PRBC  1/2- continue HD per nephrology, labs in AM   1/3- potassium is 6.4. Creatinine is 7.3, CO2 is 9  1/4- Potassium is 6.2. Creatinine is 6.1. Nephrology note reviewed and appreciated. He dialzyed on 1/3. Continue HD per nephrology. The patient remains critically ill  Patient receiving regular dialysis has a metabolic acidosis due to his renal failure, CO2 is been about 16 lab pending today      Hypertension  Blood pressure looks better will review pressors, we are weaning the pressures down his is blood pressure comes up    Type 2 diabetes mellitus without complication, without long-term current use of insulin  Patient is off insulin sugars are in the 80s we have restarted feedings    Acute blood loss anemia  - H/H down to 5.5/16 - pt had bleeding from his HD insertion site yesterday this has now resolved.   - will transfuse 2 units PRBCs on HD  1/2- 9.7/27.5 - repeat CBC in  AM  1/4- Hgb is 7.2. Not sure if he will be transfused during surgery. IF not we will try to get  Him transfused with next HD  01/06/2021 transfuse 1 unit of packed red blood cells today                 Jose Urban MD  Pulmonology  [facility]

## 2022-01-06 NOTE — PLAN OF CARE
Problem: Adult Inpatient Plan of Care  Goal: Plan of Care Review  Outcome: Ongoing, Progressing  Goal: Patient-Specific Goal (Individualized)  Outcome: Ongoing, Progressing  Goal: Absence of Hospital-Acquired Illness or Injury  Outcome: Ongoing, Progressing  Goal: Optimal Comfort and Wellbeing  Outcome: Ongoing, Progressing  Goal: Readiness for Transition of Care  Outcome: Ongoing, Progressing     Problem: Adjustment to Illness (Sepsis/Septic Shock)  Goal: Optimal Coping  Outcome: Ongoing, Progressing     Problem: Bleeding (Sepsis/Septic Shock)  Goal: Absence of Bleeding  Outcome: Ongoing, Progressing     Problem: Nutrition Impaired (Sepsis/Septic Shock)  Goal: Optimal Nutrition Intake  Outcome: Ongoing, Progressing     Problem: Fluid and Electrolyte Imbalance (Acute Kidney Injury/Impairment)  Goal: Fluid and Electrolyte Balance  Outcome: Ongoing, Progressing     Problem: Oral Intake Inadequate (Acute Kidney Injury/Impairment)  Goal: Optimal Nutrition Intake  Outcome: Ongoing, Progressing     Problem: Oral Intake Inadequate (Acute Kidney Injury/Impairment)  Goal: Optimal Nutrition Intake  Outcome: Ongoing, Progressing     Problem: Oral Intake Inadequate (Acute Kidney Injury/Impairment)  Goal: Optimal Nutrition Intake  Outcome: Ongoing, Progressing     Problem: Renal Function Impairment (Acute Kidney Injury/Impairment)  Goal: Effective Renal Function  Outcome: Ongoing, Progressing     Problem: Infection  Goal: Absence of Infection Signs and Symptoms  Outcome: Ongoing, Progressing     Problem: Fall Injury Risk  Goal: Absence of Fall and Fall-Related Injury  Outcome: Ongoing, Progressing     Problem: Communication Impairment (Mechanical Ventilation, Invasive)  Goal: Effective Communication  Outcome: Ongoing, Progressing     Problem: Device-Related Complication Risk (Mechanical Ventilation, Invasive)  Goal: Optimal Device Function  Outcome: Ongoing, Progressing     Problem: Inability to Wean (Mechanical Ventilation,  Invasive)  Goal: Mechanical Ventilation Liberation  Outcome: Ongoing, Progressing     Problem: Nutrition Impairment (Mechanical Ventilation, Invasive)  Goal: Optimal Nutrition Delivery  Outcome: Ongoing, Progressing     Problem: Skin and Tissue Injury (Mechanical Ventilation, Invasive)  Goal: Absence of Device-Related Skin and Tissue Injury  Outcome: Ongoing, Progressing     Problem: Ventilator-Induced Lung Injury (Mechanical Ventilation, Invasive)  Goal: Absence of Ventilator-Induced Lung Injury  Outcome: Ongoing, Progressing     Problem: Communication Impairment (Artificial Airway)  Goal: Effective Communication  Outcome: Ongoing, Progressing     Problem: Device-Related Complication Risk (Artificial Airway)  Goal: Optimal Device Function  Outcome: Ongoing, Progressing     Problem: Skin and Tissue Injury (Artificial Airway)  Goal: Absence of Device-Related Skin or Tissue Injury  Outcome: Ongoing, Progressing     Problem: Noninvasive Ventilation Acute  Goal: Effective Unassisted Ventilation and Oxygenation  Outcome: Ongoing, Progressing     Problem: Skin Injury Risk Increased  Goal: Skin Health and Integrity  Outcome: Ongoing, Progressing     Problem: Device-Related Complication Risk (Hemodialysis)  Goal: Safe, Effective Therapy Delivery  Outcome: Ongoing, Progressing     Problem: Hemodynamic Instability (Hemodialysis)  Goal: Effective Tissue Perfusion  Outcome: Ongoing, Progressing     Problem: Infection (Hemodialysis)  Goal: Absence of Infection Signs and Symptoms  Outcome: Ongoing, Progressing     Problem: Diabetes Comorbidity  Goal: Blood Glucose Level Within Targeted Range  Outcome: Ongoing, Progressing     Problem: Impaired Wound Healing  Goal: Optimal Wound Healing  Outcome: Ongoing, Progressing   No change in patient status, will continue to monitor.

## 2022-01-06 NOTE — PROGRESS NOTES
[facility]  Nephrology  Progress Note    Patient Name: Og Garcia  MRN: [id number]  Admission Date: 12/23/2021  Hospital Length of Stay: 14 days  Attending Provider: Jose Urban MD   Primary Care Physician: Hector Arndt DNP, FNP-C  Principal Problem:Denice gangrene    Subjective:     HPI: The patient is known from the previous nephrology consult at [facility].  He is no at Specialty and continues to need dialysis support.      Interval History: The patient's situation is critical.  He remains on pressor medications.  He is getting blood products at this time.  And, he is febrile.    Review of patient's allergies indicates:  No Known Allergies  Current Facility-Administered Medications   Medication Frequency    0.9%  NaCl infusion (for blood administration) Q24H PRN    0.9%  NaCl infusion PRN    acetaminophen tablet 500 mg Q6H PRN    albuterol nebulizer solution 2.5 mg Q4H PRN    amino acid 5% - dextrose 20% solution with custom electrolytes & additives Continuous    ampicillin-sulbactam (UNASYN) 1.5 g in sodium chloride 0.9 % 100 mL IVPB (MB+) Q12H    dextrose 10 % infusion Continuous    dextrose 50% injection 12.5 g PRN    fat emulsion 20% infusion 250 mL Daily    fentaNYL 2500 mcg in D5W 250 mL infusion premix (titrating) (conc: 10 mcg/mL) PRN    glucagon (human recombinant) injection 1 mg PRN    heparin (porcine) injection 4,000 Units PRN    insulin aspart U-100 injection 1-10 Units Q6H    NORepinephrine 32 mg in dextrose 5 % 250 mL infusion PRN    pantoprazole suspension 40 mg Daily    propofol (DIPRIVAN) 10 mg/mL infusion PRN    sodium chloride 0.9% bolus 250 mL PRN       Objective:     Vital Signs (Most Recent):  Temp: (!) 102.1 °F (38.9 °C) (01/06/22 1415)  Pulse: 103 (01/06/22 1415)  Resp: 19 (01/06/22 1415)  BP: (!) 127/52 (01/06/22 1415)  SpO2: 100 % (01/06/22 1415)  O2 Device (Oxygen Therapy): ventilator (01/05/22 2315) Vital Signs (24h Range):  Temp:   [98.4 °F (36.9 °C)-102.4 °F (39.1 °C)] 102.1 °F (38.9 °C)  Pulse:  [] 103  Resp:  [16-29] 19  SpO2:  [100 %] 100 %  BP: (105-177)/(23-71) 127/52     Weight: 88.6 kg (195 lb 5.2 oz) (01/06/22 0400)  Body mass index is 27.24 kg/m².  Body surface area is 2.11 meters squared.    I/O last 3 completed shifts:  In: 3500.5 [I.V.:3300.5; IV Piggyback:200]  Out: 625 [Urine:100; Drains:225; Other:300]    Physical Exam  Vitals reviewed.   Constitutional:       Appearance: He is ill-appearing.   Cardiovascular:      Rate and Rhythm: Regular rhythm.      Pulses: Normal pulses.   Pulmonary:      Comments: The patient is ventilated  Abdominal:      Palpations: Abdomen is soft.         Significant Labs:  BMP:   Recent Labs   Lab 01/06/22  0956 01/06/22  0956 01/06/22  1331   GLU 44*  52*  --   --    *  127*  --   --    K 5.4*  5.5*  --   --    CL 93*  91*  --   --    CO2 19*  19*  --   --    BUN 38*  47*  --   --    CREATININE 5.10*  5.28*  --   --    CALCIUM 6.7*  6.7*  --   --    MG 2.1   < > 2.1    < > = values in this interval not displayed.     CBC:   Recent Labs   Lab 01/06/22  0956   WBC 13.16*   RBC 2.31*   HGB 6.9*   HCT 21.0*      MCV 90.9   MCH 29.9   MCHC 32.9        Significant Imaging:  Labs: Reviewed    Assessment/Plan:     * Denice gangrene  Continue wound care  1/5/2022.  Continue with wound debridement  1/6/2022 Plans for further wound debridement    RAHAT (acute kidney injury)  Dialysis Monday   Continue with scheduled hemodialysis for this patient.  1/5/2022 Dialysis done today.  No other changes  1/6/2022 Continue with dialysis on tomorrow.      The situation is very critical.   Thank you for your consult. I will follow-up with patient. Please contact us if you have any additional questions.    Edwin Pryor Jr, MD  Nephrology  [facility]

## 2022-01-06 NOTE — ASSESSMENT & PLAN NOTE
- 12/14 intubated   - not doing well with PSV trials. Placed back on AC  - > 15 days intubated,  consider trach placement. Holding ticagrelor for possible trach on Monday 1/1/22- plan for trach Monday   1/3- FiO2 down to 40%. Patient is going for trach today  1/4- Was unable to have trach yesterday. Plan to go to OR today  01/05/2022 x-ray shows new infiltrate arterial blood gases look okay continue increase weaning as tolerated

## 2022-01-06 NOTE — ASSESSMENT & PLAN NOTE
- s/p multiple debridements  - wound vac in place  - ID consulted for antibiotic management: He was treated with rocephin, daptomycin, clindamycin. 12/21- change clindamycin to ampicillin and treat for another week  - 12/28 wound vac changed at bedside   1/1/22 -  To again today  OR today  1/3-- wound vac in place  1/4 back to OR today  01/05/2022 patient was febrile before going back to surgery yesterday when re-culture in no change antibiotics consider anti fungal therapy if temp continues lab work pending, continue Unasyn

## 2022-01-06 NOTE — ASSESSMENT & PLAN NOTE
Blood pressure looks better will review pressors, we are weaning the pressures down his is blood pressure comes up

## 2022-01-06 NOTE — SUBJECTIVE & OBJECTIVE
Interval History: The patient's situation is critical.  He remains on pressor medications.  He is getting blood products at this time.  And, he is febrile.    Review of patient's allergies indicates:  No Known Allergies  Current Facility-Administered Medications   Medication Frequency    0.9%  NaCl infusion (for blood administration) Q24H PRN    0.9%  NaCl infusion PRN    acetaminophen tablet 500 mg Q6H PRN    albuterol nebulizer solution 2.5 mg Q4H PRN    amino acid 5% - dextrose 20% solution with custom electrolytes & additives Continuous    ampicillin-sulbactam (UNASYN) 1.5 g in sodium chloride 0.9 % 100 mL IVPB (MB+) Q12H    dextrose 10 % infusion Continuous    dextrose 50% injection 12.5 g PRN    fat emulsion 20% infusion 250 mL Daily    fentaNYL 2500 mcg in D5W 250 mL infusion premix (titrating) (conc: 10 mcg/mL) PRN    glucagon (human recombinant) injection 1 mg PRN    heparin (porcine) injection 4,000 Units PRN    insulin aspart U-100 injection 1-10 Units Q6H    NORepinephrine 32 mg in dextrose 5 % 250 mL infusion PRN    pantoprazole suspension 40 mg Daily    propofol (DIPRIVAN) 10 mg/mL infusion PRN    sodium chloride 0.9% bolus 250 mL PRN       Objective:     Vital Signs (Most Recent):  Temp: (!) 102.1 °F (38.9 °C) (01/06/22 1415)  Pulse: 103 (01/06/22 1415)  Resp: 19 (01/06/22 1415)  BP: (!) 127/52 (01/06/22 1415)  SpO2: 100 % (01/06/22 1415)  O2 Device (Oxygen Therapy): ventilator (01/05/22 2315) Vital Signs (24h Range):  Temp:  [98.4 °F (36.9 °C)-102.4 °F (39.1 °C)] 102.1 °F (38.9 °C)  Pulse:  [] 103  Resp:  [16-29] 19  SpO2:  [100 %] 100 %  BP: (105-177)/(23-71) 127/52     Weight: 88.6 kg (195 lb 5.2 oz) (01/06/22 0400)  Body mass index is 27.24 kg/m².  Body surface area is 2.11 meters squared.    I/O last 3 completed shifts:  In: 3500.5 [I.V.:3300.5; IV Piggyback:200]  Out: 625 [Urine:100; Drains:225; Other:300]    Physical Exam  Vitals reviewed.   Constitutional:        Appearance: He is ill-appearing.   Cardiovascular:      Rate and Rhythm: Regular rhythm.      Pulses: Normal pulses.   Pulmonary:      Comments: The patient is ventilated  Abdominal:      Palpations: Abdomen is soft.         Significant Labs:  BMP:   Recent Labs   Lab 01/06/22  0956 01/06/22  0956 01/06/22  1331   GLU 44*  52*  --   --    *  127*  --   --    K 5.4*  5.5*  --   --    CL 93*  91*  --   --    CO2 19*  19*  --   --    BUN 38*  47*  --   --    CREATININE 5.10*  5.28*  --   --    CALCIUM 6.7*  6.7*  --   --    MG 2.1   < > 2.1    < > = values in this interval not displayed.     CBC:   Recent Labs   Lab 01/06/22  0956   WBC 13.16*   RBC 2.31*   HGB 6.9*   HCT 21.0*      MCV 90.9   MCH 29.9   MCHC 32.9        Significant Imaging:  Labs: Reviewed

## 2022-01-06 NOTE — ASSESSMENT & PLAN NOTE
- H/H down to 5.5/16 - pt had bleeding from his HD insertion site yesterday this has now resolved.   - will transfuse 2 units PRBCs on HD  1/2- 9.7/27.5 - repeat CBC in AM  1/4- Hgb is 7.2. Not sure if he will be transfused during surgery. IF not we will try to get  Him transfused with next HD  01/06/2021 transfuse 1 unit of packed red blood cells today

## 2022-01-06 NOTE — PLAN OF CARE
Problem: Inability to Wean (Mechanical Ventilation, Invasive)  Goal: Mechanical Ventilation Liberation  Outcome: Ongoing, Progressing

## 2022-01-06 NOTE — SUBJECTIVE & OBJECTIVE
Interval History:  Patient is sedated    Objective:     Vital Signs (Most Recent):  Temp: 98.4 °F (36.9 °C) (01/06/22 0000)  Pulse: 102 (01/06/22 0200)  Resp: 18 (01/06/22 0200)  BP: (!) 149/57 (01/06/22 0200)  SpO2: 100 % (01/06/22 0200) Vital Signs (24h Range):  Temp:  [98.4 °F (36.9 °C)-99.9 °F (37.7 °C)] 98.4 °F (36.9 °C)  Pulse:  [] 102  Resp:  [15-26] 18  SpO2:  [100 %] 100 %  BP: ()/(23-59) 149/57     Weight: 90 kg (198 lb 6.6 oz)  Body mass index is 27.67 kg/m².      Intake/Output Summary (Last 24 hours) at 1/6/2022 0653  Last data filed at 1/6/2022 0347  Gross per 24 hour   Intake 1850.5 ml   Output 500 ml   Net 1350.5 ml       Physical Exam  Vitals reviewed.   Constitutional:       Appearance: Normal appearance. He is ill-appearing.      Interventions: He is sedated and intubated.   HENT:      Head: Normocephalic and atraumatic.      Nose: Nose normal.      Mouth/Throat:      Mouth: Mucous membranes are dry.      Pharynx: Oropharynx is clear.   Eyes:      Extraocular Movements: Extraocular movements intact.      Conjunctiva/sclera: Conjunctivae normal.      Pupils: Pupils are equal, round, and reactive to light.   Cardiovascular:      Rate and Rhythm: Normal rate.      Heart sounds: Normal heart sounds. No murmur heard.      Pulmonary:      Effort: Pulmonary effort is normal. He is intubated.      Breath sounds: Normal breath sounds.   Abdominal:      General: Abdomen is flat. Bowel sounds are normal.      Palpations: Abdomen is soft.   Musculoskeletal:         General: Normal range of motion.      Cervical back: Normal range of motion and neck supple.      Right lower leg: No edema.      Left lower leg: No edema.   Skin:     General: Skin is warm and dry.      Capillary Refill: Capillary refill takes less than 2 seconds.   Neurological:      General: No focal deficit present.      Mental Status: He is alert.         Vents:  Vent Mode: A/C (01/06/22 0445)  Set Rate: 16 BPM (01/06/22 0445)  Vt  Set: 450 mL (01/06/22 0445)  Pressure Support: 12 cmH20 (12/29/21 0036)  PEEP/CPAP: 5 cmH20 (01/06/22 0445)  Oxygen Concentration (%): 40 (01/06/22 0400)  Peak Airway Pressure: 17 cmH2O (01/06/22 0445)  Total Ve: 5.9 mL (01/06/22 0445)  F/VT Ratio<105 (RSBI): (!) 52.46 (01/05/22 2315)    Lines/Drains/Airways     Peripherally Inserted Central Catheter Line            PICC Double Lumen 01/05/22 left basilic 1 day          Central Venous Catheter Line                 Hemodialysis Catheter 12/30/21 0900 right internal jugular 6 days          Drain                 NG/OG Tube [place] sump 18 Fr. Left nostril -- days         Colostomy 12/18/21 1030 Descending/sigmoid LUQ 18 days          Airway                 Surgical Airway 01/04/22 Shiley Cuffed 2 days          Peripheral Intravenous Line                 Peripheral IV - Single Lumen 01/05/22 0800 22 G Distal;Posterior;Right Hand <1 day                Significant Labs:    CBC/Anemia Profile:  Recent Labs   Lab 01/05/22  1744   WBC 15.39*   HGB 6.8*   HCT 19.8*      MCV 86.5   RDW 15.3*        Chemistries:  Recent Labs   Lab 01/04/22  0837   *   K 6.2*   CL 95*   CO2 16*   BUN 75*   CREATININE 6.10*   CALCIUM 6.9*       All pertinent labs within the past 24 hours have been reviewed.    Significant Imaging:  I have reviewed all pertinent imaging results/findings within the past 24 hours.

## 2022-01-07 PROBLEM — E83.39 HYPERPHOSPHATEMIA: Status: ACTIVE | Noted: 2022-01-01

## 2022-01-07 NOTE — OR NURSING
1117- received pt. From surgery via bed. Color good. Resp. Easy. Pt. Connected to ventilator and monitors in Herb #3. Pt. Has scd hoses austyn. All iv medications of levo at 7ml/hr, fentanyl at 3ml/hr, diprivan off, Tpn at 75ml/hr all drips in left upper arm pic line  Except levophed at 7 ml/hr in rt. Hand without problems. Has rt. Dialysis catheter in place. Pt. Has on wafer boots austyn. Ventilator setting of 40% 02, 5-PEEP, rate-12 noted.    1136- report given to Eda Casey RN. Care transferred back to care of specialty nurse. No change in assessment from arrival assessment to HERB# 3 to discharge from HERB# 3. Size 8 trach noted and connected to ventilator. Pt. Has on wafer boots. NG to left nare and capped. Wound vac to mid abd. Incision with 125 of pressure noted with small amt of bloody drainage noted. nsr via scope.

## 2022-01-07 NOTE — ASSESSMENT & PLAN NOTE
- s/p multiple debridements  - wound vac in place  - ID consulted for antibiotic management: He was treated with rocephin, daptomycin, clindamycin. 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  Will continue treat for now.  Will consult Infectious Disease.  Patient going for repeat surgery for debridement.  Prognosis is very guarded with this kind of infection

## 2022-01-07 NOTE — ANESTHESIA PREPROCEDURE EVALUATION
01/07/2022  Og Garcia is a 66 y.o., male.    Pre-op Assessment    I have reviewed the Patient Summary Reports.    I have reviewed the NPO Status.   I have reviewed the Medications.     Review of Systems           Anesthesia Plan  Type of Anesthesia, risks & benefits discussed:  Anesthesia Type:  general    Patient's Preference:   Plan Factors:          Intra-op Monitoring Plan: standard ASA monitors  Intra-op Monitoring Plan Comments:   Post Op Pain Control Plan: IV/PO Opioids PRN  Post Op Pain Control Plan Comments:     Induction:   IV  Beta Blocker:         Informed Consent: Patient representative understands risks and agrees with Anesthesia plan.  Questions answered. Anesthesia consent signed with patient representative.  ASA Score: 4     Day of Surgery Review of History & Physical:            Ready For Surgery From Anesthesia Perspective.     NPO greater than 8 hours  NAC  NKDA     Hct 22  K 6.4  Cr 7.3  Ca 7.1  12/17/21 EKG: Sinus rhythm 94 bpm;   RBBB with left anterior fascicular block   Inferior infarct - age undetermined  12/15/21 Echo: EF 40%     HTN  Diabetes mellitus  H/o CVA  Respiratory failure . . . 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

## 2022-01-07 NOTE — PLAN OF CARE
Plans of care ongoing  Problem: Adult Inpatient Plan of Care  Goal: Plan of Care Review  Outcome: Ongoing, Progressing  Goal: Patient-Specific Goal (Individualized)  Outcome: Ongoing, Progressing  Goal: Absence of Hospital-Acquired Illness or Injury  Outcome: Ongoing, Progressing  Goal: Optimal Comfort and Wellbeing  Outcome: Ongoing, Progressing  Goal: Readiness for Transition of Care  Outcome: Ongoing, Progressing     Problem: Adjustment to Illness (Sepsis/Septic Shock)  Goal: Optimal Coping  Outcome: Ongoing, Progressing     Problem: Bleeding (Sepsis/Septic Shock)  Goal: Absence of Bleeding  Outcome: Ongoing, Progressing     Problem: Glycemic Control Impaired (Sepsis/Septic Shock)  Goal: Blood Glucose Level Within Desired Range  Outcome: Ongoing, Progressing     Problem: Infection Progression (Sepsis/Septic Shock)  Goal: Absence of Infection Signs and Symptoms  Outcome: Ongoing, Progressing     Problem: Nutrition Impaired (Sepsis/Septic Shock)  Goal: Optimal Nutrition Intake  Outcome: Ongoing, Progressing     Problem: Fluid and Electrolyte Imbalance (Acute Kidney Injury/Impairment)  Goal: Fluid and Electrolyte Balance  Outcome: Ongoing, Progressing     Problem: Oral Intake Inadequate (Acute Kidney Injury/Impairment)  Goal: Optimal Nutrition Intake  Outcome: Ongoing, Progressing     Problem: Renal Function Impairment (Acute Kidney Injury/Impairment)  Goal: Effective Renal Function  Outcome: Ongoing, Progressing     Problem: Infection  Goal: Absence of Infection Signs and Symptoms  Outcome: Ongoing, Progressing     Problem: Fall Injury Risk  Goal: Absence of Fall and Fall-Related Injury  Outcome: Ongoing, Progressing     Problem: Communication Impairment (Mechanical Ventilation, Invasive)  Goal: Effective Communication  Outcome: Ongoing, Progressing     Problem: Device-Related Complication Risk (Mechanical Ventilation, Invasive)  Goal: Optimal Device Function  Outcome: Ongoing, Progressing     Problem: Inability to  Wean (Mechanical Ventilation, Invasive)  Goal: Mechanical Ventilation Liberation  Outcome: Ongoing, Progressing     Problem: Nutrition Impairment (Mechanical Ventilation, Invasive)  Goal: Optimal Nutrition Delivery  Outcome: Ongoing, Progressing     Problem: Skin and Tissue Injury (Mechanical Ventilation, Invasive)  Goal: Absence of Device-Related Skin and Tissue Injury  Outcome: Ongoing, Progressing

## 2022-01-07 NOTE — SUBJECTIVE & OBJECTIVE
Interval History:  Sedated    Objective:     Vital Signs (Most Recent):  Temp: 99.6 °F (37.6 °C) (01/07/22 0330)  Pulse: 101 (01/07/22 0600)  Resp: 18 (01/07/22 0600)  BP: (!) 107/47 (01/07/22 0600)  SpO2: 100 % (01/07/22 0600) Vital Signs (24h Range):  Temp:  [98.7 °F (37.1 °C)-102.4 °F (39.1 °C)] 99.6 °F (37.6 °C)  Pulse:  [] 101  Resp:  [14-25] 18  SpO2:  [100 %] 100 %  BP: ()/(42-69) 107/47     Weight: 88.6 kg (195 lb 5.2 oz)  Body mass index is 27.24 kg/m².      Intake/Output Summary (Last 24 hours) at 1/7/2022 0624  Last data filed at 1/7/2022 0316  Gross per 24 hour   Intake 1037.5 ml   Output 50 ml   Net 987.5 ml       Physical Exam  Vitals reviewed.   Constitutional:       Appearance: Normal appearance. He is ill-appearing and toxic-appearing.      Interventions: He is sedated and intubated.   HENT:      Head: Normocephalic and atraumatic.      Nose: Nose normal.      Mouth/Throat:      Mouth: Mucous membranes are dry.      Pharynx: Oropharynx is clear.   Eyes:      Extraocular Movements: Extraocular movements intact.      Conjunctiva/sclera: Conjunctivae normal.      Pupils: Pupils are equal, round, and reactive to light.   Cardiovascular:      Rate and Rhythm: Normal rate.      Heart sounds: Normal heart sounds. No murmur heard.      Pulmonary:      Effort: Pulmonary effort is normal. He is intubated.      Breath sounds: Normal breath sounds.   Abdominal:      General: Abdomen is flat. Bowel sounds are normal.      Palpations: Abdomen is soft.   Musculoskeletal:         General: Normal range of motion.      Cervical back: Normal range of motion and neck supple.      Right lower leg: No edema.      Left lower leg: No edema.   Skin:     General: Skin is warm and dry.      Capillary Refill: Capillary refill takes less than 2 seconds.   Neurological:      General: No focal deficit present.      Mental Status: He is alert and oriented to person, place, and time.   Psychiatric:         Mood and  Affect: Mood normal.         Behavior: Behavior normal.         Vents:  Vent Mode: A/C (01/07/22 0443)  Set Rate: 16 BPM (01/07/22 0443)  Vt Set: 480 mL (01/07/22 0443)  Pressure Support: 12 cmH20 (01/06/22 2328)  PEEP/CPAP: 5 cmH20 (01/07/22 0443)  Oxygen Concentration (%): 40 (01/07/22 0443)  Peak Airway Pressure: 26 cmH2O (01/07/22 0443)  Total Ve: 4.9 mL (01/07/22 0443)  F/VT Ratio<105 (RSBI): (!) 45.45 (01/07/22 0130)    Lines/Drains/Airways     Peripherally Inserted Central Catheter Line            PICC Double Lumen 01/05/22 left basilic 2 days          Central Venous Catheter Line                 Hemodialysis Catheter 12/30/21 0900 right internal jugular 7 days          Drain                 NG/OG Tube [place] sump 18 Fr. Left nostril -- days         Colostomy 12/18/21 1030 Descending/sigmoid LUQ 19 days          Airway                 Surgical Airway 01/04/22 Shiley Cuffed 3 days          Peripheral Intravenous Line                 Peripheral IV - Single Lumen 01/05/22 0800 22 G Distal;Posterior;Right Hand 1 day                Significant Labs:    CBC/Anemia Profile:  Recent Labs   Lab 01/05/22  1744 01/06/22  0956   WBC 15.39* 13.16*   HGB 6.8* 6.9*   HCT 19.8* 21.0*    236   MCV 86.5 90.9   RDW 15.3* 15.2*        Chemistries:  Recent Labs   Lab 01/06/22  0956 01/06/22  1331   *  127*  --    K 5.4*  5.5*  --    CL 93*  91*  --    CO2 19*  19*  --    BUN 38*  47*  --    CREATININE 5.10*  5.28*  --    CALCIUM 6.7*  6.7*  --    ALBUMIN 0.5*  --    BILITOT 1.2  --    ALKPHOS 234*  --    MG 2.1 2.1   PHOS 8.9* >9.0*       All pertinent labs within the past 24 hours have been reviewed.    Significant Imaging:  I have reviewed all pertinent imaging results/findings within the past 24 hours.

## 2022-01-07 NOTE — INTERVAL H&P NOTE
The patient has been examined and the H&P has been reviewed:    I concur with the findings and no changes have occurred since H&P was written.    Patient's pathology came back as mucormycosis.  Will need to go to the operating room for wound VAC change and further debridement of the necrotic areas.  Risks and benefits explained to the family.  They understand overall he is very sick especially with this new fungal infection and starting antibiotics.  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

## 2022-01-07 NOTE — ASSESSMENT & PLAN NOTE
Dialysis Monday   Continue with scheduled hemodialysis for this patient.  1/5/2022 Dialysis done today.  No other changes  1/6/2022 Continue with dialysis on tomorrow.  1/7/2022  Dialysis as scheduled today.  And dialysis on tomorrow.

## 2022-01-07 NOTE — OP NOTE
[facility] - Periop Services  Surgery Department  Operative Note    SUMMARY     Date of Procedure: 1/7/2022     Procedure: Procedure(s) (LRB):  WASHOUT (N/A)     Surgeon(s) and Role:     * Harish Cazares MD - Primary    Assisting Surgeon: None    Pre-Operative Diagnosis: Necrotizing fasciitis [M72.6]    Post-Operative Diagnosis: Post-Op Diagnosis Codes:     * Necrotizing fasciitis [M72.6]    Anesthesia: General/MAC    Procedures Performed:  Washout of abdominal cavity with drainage of abscess and debridement of abdominal wall musculature 2) Rectal washout    Significant Findings of the Procedure:  Pocket of purulence in the right lower quadrant with continued necrosis of the muscles.  Debridement of the Abdominal wall musculature all the way down to the pericolic gutters.    Procedure in Detail:  After informed consent patient brought to the OR prepped and draped in the usual sterile fashion.  We started off by inspecting the abdominal cavity with wound VAC removed.  Pocket of purulence was found in the right lower quadrant where more of the necrotic fascia had essentially necrosed.  Patient had obvious necrosis of the lateral aspects of the fascia that were not fully debrided and we excised as much of the fascia as we could all way down to the pericolic gutter on the right lateral aspect.  This approximately a 15 by 10 cm area of the face musculature/abdominal wall that had fibrinous necrotic material on the anterior aspect.  We carried this all the way down to the pelvis area and debrided more of this pelvic area approximately 15 x 15 cm area of necrotic muscles requiring debridement excision as much as possible with any adherent areas were scraped.  We also continued this on the left pericolic gutter which was not quite extensive and was more in the inferior portion we took about 10 x 10 cm of this area.  Once we did this we irrigated out the abdominal cavity and placed our ABThera wound VAC system back  into the abdomen making sure that the plastic coating was stay touching the bowels and the sponge was anterior to this.  We then dressed the area applied the colostomy in place.  We then changed the rectal packing which was relatively clean with no additional purulence or necrotic areas.  Patient tolerated the procedures well..    Complications: No    Estimated Blood Loss (EBL): * No values recorded between 1/7/2022 10:21 AM and 1/7/2022 11:11 AM *           Implants: * No implants in log *    Specimens:   Specimen (24h ago, onward)            None                  Condition: Good    Disposition: PACU - hemodynamically stable.    Attestation: I was present and scrubbed for the entire procedure.

## 2022-01-07 NOTE — PROGRESS NOTES
[facility]  Adult Nutrition  Follow-up Note         Reason for Assessment  Reason For Assessment: RD follow-up  Nutrition Risk Screen: large or nonhealing wound, burn or pressure injury  Malnutrition  Is Patient Malnourished: No  Nutrition Diagnosis  Increased protein Needs   related to Decreased/ impaired skin integrity as evidenced by wounds    Nutrition Diagnosis Status: Improving      Nutrition Risk  Level of Risk/Frequency of Follow-up: high   Chewing or Swallowing Difficulty?: Swallowing difficulty  Estimated/Assessed Needs  RMR ([place]-St. Jeor Equation): 1719.13 Activity Factor: 1.2 Injury Factor: 1.2   Total Ve: 4.9 mL Temp: (!) 101.5 °F (38.6 °C)Oral  Weight Used For Calorie Calculations: 91.7 kg (202 lb 2.6 oz)   Energy Need Method: [place]-St Realiusor Energy Calorie Requirements (kcal): 1774  Weight Used For Protein Calculations: 81 kg (178 lb 9.2 oz) (adjusted body wt)  Protein Requirements:   Estimated Fluid Requirement Method: RDA Method Fluid Requirements (mL): 1774  RDA Method (mL): 1774     Nutrition Prescription / Recommendations  Recommendation/Intervention: TPN: Clinimix 5/20 @ 75ml/hr; 250ml of lipids daily  Goals: TPN tolerance, wound healing; pt will meet estimated nutritional needs  Nutrition Goal Status: new  Communication of RD Recs: reviewed with physician  Current Diet Order: NPO/ parenteral nutrition  Nutrition Order Comments: TPN: Clinimix 5/20 @ 70ml/hr; 250ml lipids daily  Current Nutrition Support Formula Ordered: Clinimix E 5/20  Current Nutrition Support Rate Ordered: 75 (ml)  Current Nutrition Support Frequency Ordered: -  Recommended Diet: Parenteral Nutrition Clinimix @ 5/20 @ 75m l/hr; 250ml lipids daily  Recommended Oral Supplement: No Oral Supplements  Is Nutrition Support Recommended: No  Is Education Recommended: No  Monitor and Evaluation  % current Intake: On PPN meeting 100% of Caloric needs and 100% of Protein needs  % intake to meet estimated  needs: Parenteral Nutrition  Food and Nutrient Intake: parenteral nutrition intake  Food and Nutrient Adminstration: enteral and parenteral nutrition administration  Anthropometric Measurements: height/length,weight,weight change,body mass index  Biochemical Data, Medical Tests and Procedures: electrolyte and renal panel,glucose/endocrine profile  Nutrition-Focused Physical Findings: skin  Enteral Calories (kcal): 2160  Enteral Protein (gm): 96  Enteral (Free Water) Fluid (mL): 864  Free Water Flush Fluid (mL): 900  Parenteral Calories (kcal): 2084  Parenteral Protein (gm): 90  Parenteral Fluid (mL): 1800  Lipid Calories (kcals): 500 kcals  Other Calories (kcal): 528 (propofol)  Total Calories (kcal): 2271  Total Calories (kcal/kg): 2612  % Kcal Needs: 100  % Protein Needs: 100  IV Fluid (mL): 1764  Total Fluid Intake (mL): 1764  Energy Calories Required: exceeds needs  Protein Required: meeting needs  Fluid Required: meeting needs  Tolerance: tolerating  Current Medical Diagnosis and Past Medical History  Diagnosis: gastrointestinal disease,infection/sepsis  Past Medical History:   Diagnosis Date    Hyperlipidemia     Hypertension      Nutrition/Diet History  Spiritual, Cultural Beliefs, [employer] Practices, Values that Affect Care: no  Food Allergies: NKFA  Factors Affecting Nutritional Intake: None identified at this time  Lab/Procedures/Meds  Recent Labs   Lab 01/06/22  0956 01/06/22  0956 01/06/22  1331 01/07/22  0701   *  127*   < >  --  120*   K 5.4*  5.5*   < >  --  5.1   BUN 38*  47*   < >  --  58*   CREATININE 5.10*  5.28*   < >  --  6.42*   GLU 44*  52*   < >  --  215*   CALCIUM 6.7*  6.7*   < >  --  6.5*   ALBUMIN 0.5*  --   --   --    CL 93*  91*   < >  --  87*   PHOS 8.9*   < > >9.0*  --     < > = values in this interval not displayed.     Last A1c: No results found for: HGBA1C  Lab Results   Component Value Date    RBC 2.63 (L) 01/07/2022    HGB 7.8 (L) 01/07/2022    HCT 22.9 (L)  "01/07/2022    MCV 87.1 01/07/2022    MCH 29.7 01/07/2022    MCHC 34.1 01/07/2022     Pertinent Labs Reviewed: reviewed  Pertinent Labs Comments: Hct 22.9, Na 120, BUN 58, Cr 6.42, Glu 215, Alb .5  Pertinent Medications Reviewed: reviewed  Pertinent Medications Comments: heparin, insulin  Anthropometrics  Temp: (!) 101.5 °F (38.6 °C)  Height Method: Stated  Height: 5' 11" (180.3 cm)  Height (inches): 71 in  Weight Method: Bed Scale  Weight: 91.7 kg (202 lb 2.6 oz)  Weight (lb): 202.16 lb  Ideal Body Weight (IBW), Male: 172 lb  % Ideal Body Weight, Male (lb): 115.36 %  BMI (Calculated): 28.2  BMI Grade: 25 - 29.9 - overweight     Nutrition by Nursing  Diet/Nutrition Received: NPO     Diet/Feeding Assistance: total feed  Diet/Feeding Tolerance: other (see comments)  Last Bowel Movement: 01/05/22       NG/OG Tube [place] sump 18 Fr. Left nostril-Feeding Type: continuous       NG/OG Tube [place] sump 18 Fr. Left nostril-Current Rate (mL/hr): 50 mL/hr       NG/OG Tube [place] sump 18 Fr. Left nostril-Goal Rate (mL/hr): 65 mL/hr       NG/OG Tube [place] sump 18 Fr. Left nostril-Formula Name:  (.)  Nutrition Follow-Up  RD Follow-up?: Yes  Assessment and Plan  No new Assessment & Plan notes have been filed under this hospital service since the last note was generated.  Service: Nutrition     "

## 2022-01-07 NOTE — PROGRESS NOTES
[facility]  Pulmonology  Progress Note    Patient Name: Og Garcia  MRN: [id number]  Admission Date: 12/23/2021  Hospital Length of Stay: 15 days  Code Status: Prior  Attending Provider: Cecil Abernathy DO  Primary Care Provider: Hector Arndt DNP, FNP-C   Principal Problem: Denice gangrene    Subjective:     Interval History:  Sedated    Objective:     Vital Signs (Most Recent):  Temp: 99.6 °F (37.6 °C) (01/07/22 0330)  Pulse: 101 (01/07/22 0600)  Resp: 18 (01/07/22 0600)  BP: (!) 107/47 (01/07/22 0600)  SpO2: 100 % (01/07/22 0600) Vital Signs (24h Range):  Temp:  [98.7 °F (37.1 °C)-102.4 °F (39.1 °C)] 99.6 °F (37.6 °C)  Pulse:  [] 101  Resp:  [14-25] 18  SpO2:  [100 %] 100 %  BP: ()/(42-69) 107/47     Weight: 88.6 kg (195 lb 5.2 oz)  Body mass index is 27.24 kg/m².      Intake/Output Summary (Last 24 hours) at 1/7/2022 0624  Last data filed at 1/7/2022 0316  Gross per 24 hour   Intake 1037.5 ml   Output 50 ml   Net 987.5 ml       Physical Exam  Vitals reviewed.   Constitutional:       Appearance: Normal appearance. He is ill-appearing and toxic-appearing.      Interventions: He is sedated and intubated.   HENT:      Head: Normocephalic and atraumatic.      Nose: Nose normal.      Mouth/Throat:      Mouth: Mucous membranes are dry.      Pharynx: Oropharynx is clear.   Eyes:      Extraocular Movements: Extraocular movements intact.      Conjunctiva/sclera: Conjunctivae normal.      Pupils: Pupils are equal, round, and reactive to light.   Cardiovascular:      Rate and Rhythm: Normal rate.      Heart sounds: Normal heart sounds. No murmur heard.      Pulmonary:      Effort: Pulmonary effort is normal. He is intubated.      Breath sounds: Normal breath sounds.   Abdominal:      General: Abdomen is flat. Bowel sounds are normal.      Palpations: Abdomen is soft.   Musculoskeletal:         General: Normal range of motion.      Cervical back: Normal range of motion and  neck supple.      Right lower leg: No edema.      Left lower leg: No edema.   Skin:     General: Skin is warm and dry.      Capillary Refill: Capillary refill takes less than 2 seconds.   Neurological:      General: No focal deficit present.      Mental Status: He is alert and oriented to person, place, and time.   Psychiatric:         Mood and Affect: Mood normal.         Behavior: Behavior normal.         Vents:  Vent Mode: A/C (01/07/22 0443)  Set Rate: 16 BPM (01/07/22 0443)  Vt Set: 480 mL (01/07/22 0443)  Pressure Support: 12 cmH20 (01/06/22 2328)  PEEP/CPAP: 5 cmH20 (01/07/22 0443)  Oxygen Concentration (%): 40 (01/07/22 0443)  Peak Airway Pressure: 26 cmH2O (01/07/22 0443)  Total Ve: 4.9 mL (01/07/22 0443)  F/VT Ratio<105 (RSBI): (!) 45.45 (01/07/22 0130)    Lines/Drains/Airways     Peripherally Inserted Central Catheter Line            PICC Double Lumen 01/05/22 left basilic 2 days          Central Venous Catheter Line                 Hemodialysis Catheter 12/30/21 0900 right internal jugular 7 days          Drain                 NG/OG Tube [place] sump 18 Fr. Left nostril -- days         Colostomy 12/18/21 1030 Descending/sigmoid LUQ 19 days          Airway                 Surgical Airway 01/04/22 Shiley Cuffed 3 days          Peripheral Intravenous Line                 Peripheral IV - Single Lumen 01/05/22 0800 22 G Distal;Posterior;Right Hand 1 day                Significant Labs:    CBC/Anemia Profile:  Recent Labs   Lab 01/05/22  1744 01/06/22  0956   WBC 15.39* 13.16*   HGB 6.8* 6.9*   HCT 19.8* 21.0*    236   MCV 86.5 90.9   RDW 15.3* 15.2*        Chemistries:  Recent Labs   Lab 01/06/22  0956 01/06/22  1331   *  127*  --    K 5.4*  5.5*  --    CL 93*  91*  --    CO2 19*  19*  --    BUN 38*  47*  --    CREATININE 5.10*  5.28*  --    CALCIUM 6.7*  6.7*  --    ALBUMIN 0.5*  --    BILITOT 1.2  --    ALKPHOS 234*  --    MG 2.1 2.1   PHOS 8.9* >9.0*       All pertinent labs within the  past 24 hours have been reviewed.    Significant Imaging:  I have reviewed all pertinent imaging results/findings within the past 24 hours.    Assessment/Plan:     * Denice gangrene  - s/p multiple debridements  - wound vac in place  - ID consulted for antibiotic management: He was treated with rocephin, daptomycin, clindamycin. 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  Will continue treat for now.  Will consult Infectious Disease.  Patient going for repeat surgery for debridement.  Prognosis is very guarded with this kind of infection      On mechanically assisted ventilation  - 12/14 intubated   - not doing well with PSV trials. Placed back on AC  - > 15 days intubated,  consider trach placement. Holding ticagrelor for possible trach on Monday 1/1/22- plan for trach Monday   1/3- FiO2 down to 40%. Patient is going for trach today  1/4- Was unable to have trach yesterday. Plan to go to OR today  01/05/2022 x-ray shows new infiltrate arterial blood gases look okay continue increase weaning as tolerated  01/07/2021 patient remains very ill wean as tolerated    RAHAT (acute kidney injury)  - nephrology consulted   - no sign of renal recovery.  Continuing with scheduled hemodialysis for this patient.  - tunneled HD line placement 12/30  - 12/31 - potassium 6.36 - plan for HD again today  with 2 PRBC  1/2- continue HD per nephrology, labs in AM   1/3- potassium is 6.4. Creatinine is 7.3, CO2 is 9  1/4- Potassium is 6.2. Creatinine is 6.1. Nephrology note reviewed and  appreciated. He dialzyed on 1/3. Continue HD per nephrology. The patient remains critically ill  Discussed the case yesterday 01/06/2021 with Dr. elliott.  He is aware of issues in the amphotericin with a dialyzing today and tomorrow      Hypertension  Blood pressure looks better will review pressors, we are weaning the pressors down his is blood pressure comes up    Hyperphosphatemia  Defer to Renal Service to treat    Type 2 diabetes mellitus without complication, without long-term current use of insulin  Sugars are better with hyper al will restart Jacquelin Urban MD  Pulmonology  [facility]

## 2022-01-07 NOTE — ANESTHESIA POSTPROCEDURE EVALUATION
Anesthesia Post Evaluation    Patient: Og [facility]    Procedure(s) Performed: Procedure(s) (LRB):  CREATION, TRACHEOSTOMY (N/A)  DEBRIDEMENT, LOWER EXTREMITY (Right)  LAPAROTOMY, EXPLORATORY (N/A)    Final Anesthesia Type: general      Patient location during evaluation: floor  Patient participation: Yes- Able to Participate  Level of consciousness: awake and alert  Post-procedure vital signs: reviewed and stable  Pain management: adequate  Airway patency: patent  NAMITA mitigation strategies: Multimodal analgesia  PONV status at discharge: No PONV  Anesthetic complications: no      Cardiovascular status: blood pressure returned to baseline  Respiratory status: Tracheostomy  Hydration status: euvolemic  Follow-up not needed.          Vitals Value Taken Time   /40 01/07/22 1136   Temp 38.1 °C (100.6 °F) 01/07/22 1200   Pulse 71 01/07/22 1134   Resp 16 01/07/22 1134   SpO2 97 % 01/07/22 1134         Event Time   Out of Recovery 15:20:00         Pain/Rio Score: Pain Rating Prior to Med Admin: 0 (1/7/2022  8:07 AM)  Rio Score: 4 (1/7/2022 11:36 AM)

## 2022-01-07 NOTE — ASSESSMENT & PLAN NOTE
Blood pressure looks better will review pressors, we are weaning the pressors down his is blood pressure comes up

## 2022-01-07 NOTE — TRANSFER OF CARE
Anesthesia Transfer of Care Note    Patient: Og Wolf Garcia    Procedure(s) Performed: Procedure(s) (LRB):  WASHOUT (N/A)    Patient location: PACU    Anesthesia Type: general    Transport from OR: Transported from OR on room air with adequate spontaneous ventilation    Post pain: adequate analgesia    Post assessment: no apparent anesthetic complications and tolerated procedure well    Post vital signs: stable    Level of consciousness: awake    Nausea/Vomiting: no nausea    Complications: none    Transfer of care protocol was followed      Last vitals:   Visit Vitals  /76 (BP Location: Right arm, Patient Position: Lying)   Pulse 66   Temp (!) 38.4 °C (101.1 °F)   Resp 16   SpO2 99%

## 2022-01-07 NOTE — ASSESSMENT & PLAN NOTE
- 12/14 intubated   - not doing well with PSV trials. Placed back on AC  - > 15 days intubated,  consider trach placement. Holding ticagrelor for possible trach on Monday 1/1/22- plan for trach Monday   1/3- FiO2 down to 40%. Patient is going for trach today  1/4- Was unable to have trach yesterday. Plan to go to OR today  01/05/2022 x-ray shows new infiltrate arterial blood gases look okay continue increase weaning as tolerated  01/07/2021 patient remains very ill wean as tolerated

## 2022-01-07 NOTE — SUBJECTIVE & OBJECTIVE
Interval History: The patient is now s/p a wound washout.  He remains on pressor medications.    Review of patient's allergies indicates:  No Known Allergies  Current Facility-Administered Medications   Medication Frequency    [MAR Hold - Suspended Admission] 0.9%  NaCl infusion (for blood administration) Q24H PRN    [MAR Hold - Suspended Admission] 0.9%  NaCl infusion PRN    [MAR Hold - Suspended Admission] acetaminophen tablet 500 mg Q6H PRN    [MAR Hold - Suspended Admission] albuterol nebulizer solution 2.5 mg Q4H PRN    [MAR Hold - Suspended Admission] amino acid 5% - dextrose 20% solution with custom electrolytes & additives Continuous    [MAR Hold - Suspended Admission] amphotericin B liposome (AMBISOME) 450 mg in dextrose 5 % 450 mL IVPB Q24H    [MAR Hold - Suspended Admission] ampicillin-sulbactam (UNASYN) 1.5 g in sodium chloride 0.9 % 100 mL IVPB (MB+) Q12H    [MAR Hold - Suspended Admission] dextrose 50% injection 12.5 g PRN    [MAR Hold - Suspended Admission] fentaNYL 2500 mcg in D5W 250 mL infusion premix (titrating) (conc: 10 mcg/mL) PRN    [MAR Hold - Suspended Admission] glucagon (human recombinant) injection 1 mg PRN    [MAR Hold - Suspended Admission] heparin (porcine) injection 4,000 Units PRN    [MAR Hold - Suspended Admission] insulin aspart U-100 injection 1-10 Units Q6H    [MAR Hold - Suspended Admission] insulin detemir U-100 injection 20 Units QHS    [MAR Hold - Suspended Admission] NORepinephrine 32 mg in dextrose 5 % 250 mL infusion PRN    [MAR Hold - Suspended Admission] pantoprazole suspension 40 mg Daily    [MAR Hold - Suspended Admission] propofol (DIPRIVAN) 10 mg/mL infusion PRN    [MAR Hold - Suspended Admission] sodium chloride 0.9% bolus 250 mL PRN     Current Outpatient Medications   Medication    insulin (LANTUS SOLOSTAR U-100 INSULIN) glargine 100 units/mL (3mL) SubQ pen    lisinopriL 10 MG tablet    metoprolol tartrate (LOPRESSOR) 25 MG tablet        Objective:     Vital Signs (Most Recent):  Temp: (!) 100.6 °F (38.1 °C) (01/07/22 1200)  Pulse: 101 (01/07/22 0600)  Resp: 18 (01/07/22 0600)  BP: (!) 107/47 (01/07/22 0600)  SpO2: 100 % (01/07/22 0600)  O2 Device (Oxygen Therapy): ventilator (01/07/22 0643) Vital Signs (24h Range):  Temp:  [99.6 °F (37.6 °C)-102.4 °F (39.1 °C)] 100.6 °F (38.1 °C)  Pulse:  [] 71  Resp:  [14-25] 16  SpO2:  [96 %-100 %] 97 %  BP: ()/(39-76) 102/40     Weight: 91.7 kg (202 lb 2.6 oz) (01/07/22 0600)  Body mass index is 28.2 kg/m².  Body surface area is 2.14 meters squared.    I/O last 3 completed shifts:  In: 2587.5 [I.V.:1900; Blood:37.5; IV Piggyback:650]  Out: 575 [Drains:225; Other:350]    Physical Exam  Vitals reviewed.   Constitutional:       Appearance: He is ill-appearing.   Cardiovascular:      Rate and Rhythm: Regular rhythm.   Pulmonary:      Breath sounds: Normal breath sounds.      Comments: The patient is ventilated  Abdominal:      Palpations: Abdomen is soft.         Significant Labs:  BMP:   Recent Labs   Lab 01/06/22  0956 01/06/22  1331 01/07/22  0701   GLU   < >  --  215*   NA   < >  --  120*   K   < >  --  5.1   CL   < >  --  87*   CO2   < >  --  16*   BUN   < >  --  58*   CREATININE   < >  --  6.42*   CALCIUM   < >  --  6.5*   MG  --  2.1  --     < > = values in this interval not displayed.     CBC:   Recent Labs   Lab 01/07/22  0701   WBC 14.84*   RBC 2.63*   HGB 7.8*   HCT 22.9*      MCV 87.1   MCH 29.7   MCHC 34.1        Significant Imaging:  Labs: Reviewed

## 2022-01-07 NOTE — PROGRESS NOTES
[facility]  Nephrology  Progress Note    Patient Name: Og Garcia  MRN: [id number]  Admission Date: 12/23/2021  Hospital Length of Stay: 15 days  Attending Provider: Cecil Abernathy DO   Primary Care Physician: Hector Arndt DNP, FNP-C  Principal Problem:Denice gangrene    Subjective:     HPI: The patient is known from the previous nephrology consult at [facility].  He is no at Specialty and continues to need dialysis support.      Interval History: The patient is now s/p a wound washout.  He remains on pressor medications.    Review of patient's allergies indicates:  No Known Allergies  Current Facility-Administered Medications   Medication Frequency    [MAR Hold - Suspended Admission] 0.9%  NaCl infusion (for blood administration) Q24H PRN    [MAR Hold - Suspended Admission] 0.9%  NaCl infusion PRN    [MAR Hold - Suspended Admission] acetaminophen tablet 500 mg Q6H PRN    [MAR Hold - Suspended Admission] albuterol nebulizer solution 2.5 mg Q4H PRN    [MAR Hold - Suspended Admission] amino acid 5% - dextrose 20% solution with custom electrolytes & additives Continuous    [MAR Hold - Suspended Admission] amphotericin B liposome (AMBISOME) 450 mg in dextrose 5 % 450 mL IVPB Q24H    [MAR Hold - Suspended Admission] ampicillin-sulbactam (UNASYN) 1.5 g in sodium chloride 0.9 % 100 mL IVPB (MB+) Q12H    [MAR Hold - Suspended Admission] dextrose 50% injection 12.5 g PRN    [MAR Hold - Suspended Admission] fentaNYL 2500 mcg in D5W 250 mL infusion premix (titrating) (conc: 10 mcg/mL) PRN    [MAR Hold - Suspended Admission] glucagon (human recombinant) injection 1 mg PRN    [MAR Hold - Suspended Admission] heparin (porcine) injection 4,000 Units PRN    [MAR Hold - Suspended Admission] insulin aspart U-100 injection 1-10 Units Q6H    [MAR Hold - Suspended Admission] insulin detemir U-100 injection 20 Units QHS    [MAR Hold - Suspended Admission] NORepinephrine 32 mg in dextrose 5  % 250 mL infusion PRN    [MAR Hold - Suspended Admission] pantoprazole suspension 40 mg Daily    [MAR Hold - Suspended Admission] propofol (DIPRIVAN) 10 mg/mL infusion PRN    [MAR Hold - Suspended Admission] sodium chloride 0.9% bolus 250 mL PRN     Current Outpatient Medications   Medication    insulin (LANTUS SOLOSTAR U-100 INSULIN) glargine 100 units/mL (3mL) SubQ pen    lisinopriL 10 MG tablet    metoprolol tartrate (LOPRESSOR) 25 MG tablet       Objective:     Vital Signs (Most Recent):  Temp: (!) 100.6 °F (38.1 °C) (01/07/22 1200)  Pulse: 101 (01/07/22 0600)  Resp: 18 (01/07/22 0600)  BP: (!) 107/47 (01/07/22 0600)  SpO2: 100 % (01/07/22 0600)  O2 Device (Oxygen Therapy): ventilator (01/07/22 0443) Vital Signs (24h Range):  Temp:  [99.6 °F (37.6 °C)-102.4 °F (39.1 °C)] 100.6 °F (38.1 °C)  Pulse:  [] 71  Resp:  [14-25] 16  SpO2:  [96 %-100 %] 97 %  BP: ()/(39-76) 102/40     Weight: 91.7 kg (202 lb 2.6 oz) (01/07/22 0600)  Body mass index is 28.2 kg/m².  Body surface area is 2.14 meters squared.    I/O last 3 completed shifts:  In: 2587.5 [I.V.:1900; Blood:37.5; IV Piggyback:650]  Out: 575 [Drains:225; Other:350]    Physical Exam  Vitals reviewed.   Constitutional:       Appearance: He is ill-appearing.   Cardiovascular:      Rate and Rhythm: Regular rhythm.   Pulmonary:      Breath sounds: Normal breath sounds.      Comments: The patient is ventilated  Abdominal:      Palpations: Abdomen is soft.         Significant Labs:  BMP:   Recent Labs   Lab 01/06/22  0956 01/06/22  1331 01/07/22  0701   GLU   < >  --  215*   NA   < >  --  120*   K   < >  --  5.1   CL   < >  --  87*   CO2   < >  --  16*   BUN   < >  --  58*   CREATININE   < >  --  6.42*   CALCIUM   < >  --  6.5*   MG  --  2.1  --     < > = values in this interval not displayed.     CBC:   Recent Labs   Lab 01/07/22  0701   WBC 14.84*   RBC 2.63*   HGB 7.8*   HCT 22.9*      MCV 87.1   MCH 29.7   MCHC 34.1        Significant  Imaging:  Labs: Reviewed    Assessment/Plan:     * Denice gangrene  Continue wound care  1/5/2022.  Continue with wound debridement  1/6/2022 Plans for further wound debridement  1/7/2022 Now s/p wound washout    RAHAT (acute kidney injury)  Dialysis Monday   Continue with scheduled hemodialysis for this patient.  1/5/2022 Dialysis done today.  No other changes  1/6/2022 Continue with dialysis on tomorrow.  1/7/2022  Dialysis as scheduled today.  And dialysis on tomorrow.        Thank you for your consult. I will follow-up with patient. Please contact us if you have any additional questions.    Edwin Pryor Jr, MD  Nephrology  [facility]

## 2022-01-07 NOTE — ASSESSMENT & PLAN NOTE
- nephrology consulted   - no sign of renal recovery.  Continuing with scheduled hemodialysis for this patient.  - tunneled HD line placement 12/30  - 12/31 - potassium 6.36 - plan for HD again today  with 2 PRBC  1/2- continue HD per nephrology, labs in AM   1/3- potassium is 6.4. Creatinine is 7.3, CO2 is 9  1/4- Potassium is 6.2. Creatinine is 6.1. Nephrology note reviewed and appreciated. He dialzyed on 1/3. Continue HD per nephrology. The patient remains critically ill  Discussed the case yesterday 01/06/2021 with Dr. elliott.  He is aware of issues in the amphotericin with a dialyzing today and tomorrow

## 2022-01-07 NOTE — ASSESSMENT & PLAN NOTE
Continue wound care  1/5/2022.  Continue with wound debridement  1/6/2022 Plans for further wound debridement  1/7/2022 Now s/p wound washout

## 2022-01-08 PROBLEM — R57.9 SHOCK: Status: ACTIVE | Noted: 2022-01-01

## 2022-01-08 NOTE — PLAN OF CARE
Problem: Adult Inpatient Plan of Care  Goal: Optimal Comfort and Wellbeing  Outcome: Ongoing, Progressing     Problem: Glycemic Control Impaired (Sepsis/Septic Shock)  Goal: Blood Glucose Level Within Desired Range  Outcome: Ongoing, Progressing

## 2022-01-08 NOTE — ASSESSMENT & PLAN NOTE
- nephrology consulted   - no sign of renal recovery.  Continuing with scheduled hemodialysis for this patient.  - tunneled HD line placement 12/30

## 2022-01-08 NOTE — DIALYSIS ROUNDING
Mr. Garcia is seen during his hemodialysis. He is unresponsive but breathing spontaneously. Remains critically ill. Continue dialysis support for now.

## 2022-01-08 NOTE — PROGRESS NOTES
[facility]  Pulmonology  Progress Note    Patient Name: Og Garcia  MRN: [id number]  Admission Date: 12/23/2021  Hospital Length of Stay: 16 days  Code Status: Prior  Attending Provider: Cecil Abernathy DO  Primary Care Provider: Hector Arndt DNP, FNP-C   Principal Problem: Denice gangrene    Subjective:     Interval History: Sedated this am. Remains on pressors.    Objective:     Vital Signs (Most Recent):  Temp: 100 °F (37.8 °C) (01/08/22 0600)  Pulse: 105 (01/08/22 0630)  Resp: 16 (01/08/22 0630)  BP: (!) 157/60 (01/08/22 0630)  SpO2: 100 % (01/08/22 0630) Vital Signs (24h Range):  Temp:  [98.3 °F (36.8 °C)-101.1 °F (38.4 °C)] 100 °F (37.8 °C)  Pulse:  [] 105  Resp:  [9-21] 16  SpO2:  [96 %-100 %] 100 %  BP: ()/(33-76) 157/60     Weight: 91.1 kg (200 lb 13.4 oz)  Body mass index is 28.01 kg/m².      Intake/Output Summary (Last 24 hours) at 1/8/2022 0920  Last data filed at 1/7/2022 1535  Gross per 24 hour   Intake 150 ml   Output 1500 ml   Net -1350 ml       Physical Exam  Vitals reviewed.   Constitutional:       Appearance: He is ill-appearing.      Comments: Sedated   HENT:      Head:      Comments: Right temporal deformity     Nose: Nose normal.      Mouth/Throat:      Mouth: Mucous membranes are moist.   Neck:      Comments: Tracheostomy in place  Cardiovascular:      Rate and Rhythm: Normal rate and regular rhythm.      Pulses: Normal pulses.      Heart sounds: Normal heart sounds.   Pulmonary:      Effort: No respiratory distress.      Breath sounds: No stridor. No wheezing, rhonchi or rales.      Comments: Ventilator driven breath sounds bilaterally  Abdominal:      Palpations: Abdomen is soft.      Tenderness: There is no guarding.      Comments: Anterior abdominal wall with wound vac in place. Ostomy   Genitourinary:     Comments: Packing in place  Musculoskeletal:         General: No swelling or deformity.      Cervical back: Neck supple.      Right  lower leg: No edema.      Left lower leg: No edema.   Lymphadenopathy:      Cervical: No cervical adenopathy.   Skin:     General: Skin is warm and dry.   Neurological:      Cranial Nerves: No cranial nerve deficit.      Comments: Sedated         Vents:  Vent Mode: A/C (01/08/22 0300)  Set Rate: 16 BPM (01/08/22 0300)  Vt Set: 480 mL (01/08/22 0300)  Pressure Support: 12 cmH20 (01/07/22 2230)  PEEP/CPAP: 5 cmH20 (01/08/22 0300)  Oxygen Concentration (%): 40 (01/08/22 0300)  Peak Airway Pressure: 18 cmH2O (01/08/22 0300)  Total Ve: 5.1 mL (01/08/22 0300)  F/VT Ratio<105 (RSBI): (!) 50.16 (01/08/22 0300)    Lines/Drains/Airways     Peripherally Inserted Central Catheter Line            PICC Double Lumen 01/05/22 left basilic 3 days          Central Venous Catheter Line                 Hemodialysis Catheter 12/30/21 0900 right internal jugular 9 days          Drain                 NG/OG Tube [place] sump 18 Fr. Left nostril -- days         Colostomy 12/18/21 1030 Descending/sigmoid LUQ 20 days          Peripheral Intravenous Line                 Peripheral IV - Single Lumen 01/05/22 0800 22 G Distal;Posterior;Right Hand 3 days                Significant Labs:    CBC/Anemia Profile:  Recent Labs   Lab 01/06/22  0956 01/07/22  0701   WBC 13.16* 14.84*   HGB 6.9* 7.8*   HCT 21.0* 22.9*    156   MCV 90.9 87.1   RDW 15.2* 15.2*        Chemistries:  Recent Labs   Lab 01/06/22  0956 01/06/22  1331 01/07/22  0701 01/08/22  0758   *  127*  --  120* 127*   K 5.4*  5.5*  --  5.1 3.8   CL 93*  91*  --  87* 93*   CO2 19*  19*  --  16* 20*   BUN 38*  47*  --  58* 49*   CREATININE 5.10*  5.28*  --  6.42* 4.85*   CALCIUM 6.7*  6.7*  --  6.5* 6.5*   ALBUMIN 0.5*  --   --  0.6*   PROT  --   --   --  5.8*   BILITOT 1.2  --   --  1.2   ALKPHOS 234*  --   --  291*   ALT  --   --   --  23   AST  --   --   --  36   MG 2.1 2.1  --  2.0   PHOS 8.9* >9.0*  --   --        All pertinent labs within the past 24 hours have been  reviewed.    Significant Imaging:  I have reviewed all pertinent imaging results/findings within the past 24 hours.    Assessment/Plan:     * Denice gangrene  - s/p multiple debridements  - wound vac in place  - ID consulted for antibiotic management: He was treated with rocephin, daptomycin, clindamycin. 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  Continuing with scheduled hemodialysis for this patient.  - tunneled HD line placement 12/30      On mechanically assisted ventilation  - 12/14 intubated, tracheostomy 1/4             Raul Hall MD  Pulmonology  [facility]

## 2022-01-08 NOTE — SUBJECTIVE & OBJECTIVE
Interval History: Sedated this am. Remains on pressors.    Objective:     Vital Signs (Most Recent):  Temp: 100 °F (37.8 °C) (01/08/22 0600)  Pulse: 105 (01/08/22 0630)  Resp: 16 (01/08/22 0630)  BP: (!) 157/60 (01/08/22 0630)  SpO2: 100 % (01/08/22 0630) Vital Signs (24h Range):  Temp:  [98.3 °F (36.8 °C)-101.1 °F (38.4 °C)] 100 °F (37.8 °C)  Pulse:  [] 105  Resp:  [9-21] 16  SpO2:  [96 %-100 %] 100 %  BP: ()/(33-76) 157/60     Weight: 91.1 kg (200 lb 13.4 oz)  Body mass index is 28.01 kg/m².      Intake/Output Summary (Last 24 hours) at 1/8/2022 0920  Last data filed at 1/7/2022 1535  Gross per 24 hour   Intake 150 ml   Output 1500 ml   Net -1350 ml       Physical Exam  Vitals reviewed.   Constitutional:       Appearance: He is ill-appearing.      Comments: Sedated   HENT:      Head:      Comments: Right temporal deformity     Nose: Nose normal.      Mouth/Throat:      Mouth: Mucous membranes are moist.   Neck:      Comments: Tracheostomy in place  Cardiovascular:      Rate and Rhythm: Normal rate and regular rhythm.      Pulses: Normal pulses.      Heart sounds: Normal heart sounds.   Pulmonary:      Effort: No respiratory distress.      Breath sounds: No stridor. No wheezing, rhonchi or rales.      Comments: Ventilator driven breath sounds bilaterally  Abdominal:      Palpations: Abdomen is soft.      Tenderness: There is no guarding.      Comments: Anterior abdominal wall with wound vac in place. Ostomy   Genitourinary:     Comments: Packing in place  Musculoskeletal:         General: No swelling or deformity.      Cervical back: Neck supple.      Right lower leg: No edema.      Left lower leg: No edema.   Lymphadenopathy:      Cervical: No cervical adenopathy.   Skin:     General: Skin is warm and dry.   Neurological:      Cranial Nerves: No cranial nerve deficit.      Comments: Sedated         Vents:  Vent Mode: A/C (01/08/22 0300)  Set Rate: 16 BPM (01/08/22 0300)  Vt Set: 480 mL (01/08/22  0300)  Pressure Support: 12 cmH20 (01/07/22 2230)  PEEP/CPAP: 5 cmH20 (01/08/22 0300)  Oxygen Concentration (%): 40 (01/08/22 0300)  Peak Airway Pressure: 18 cmH2O (01/08/22 0300)  Total Ve: 5.1 mL (01/08/22 0300)  F/VT Ratio<105 (RSBI): (!) 50.16 (01/08/22 0300)    Lines/Drains/Airways     Peripherally Inserted Central Catheter Line            PICC Double Lumen 01/05/22 left basilic 3 days          Central Venous Catheter Line                 Hemodialysis Catheter 12/30/21 0900 right internal jugular 9 days          Drain                 NG/OG Tube [place] sump 18 Fr. Left nostril -- days         Colostomy 12/18/21 1030 Descending/sigmoid LUQ 20 days          Peripheral Intravenous Line                 Peripheral IV - Single Lumen 01/05/22 0800 22 G Distal;Posterior;Right Hand 3 days                Significant Labs:    CBC/Anemia Profile:  Recent Labs   Lab 01/06/22  0956 01/07/22  0701   WBC 13.16* 14.84*   HGB 6.9* 7.8*   HCT 21.0* 22.9*    156   MCV 90.9 87.1   RDW 15.2* 15.2*        Chemistries:  Recent Labs   Lab 01/06/22  0956 01/06/22  1331 01/07/22  0701 01/08/22  0758   *  127*  --  120* 127*   K 5.4*  5.5*  --  5.1 3.8   CL 93*  91*  --  87* 93*   CO2 19*  19*  --  16* 20*   BUN 38*  47*  --  58* 49*   CREATININE 5.10*  5.28*  --  6.42* 4.85*   CALCIUM 6.7*  6.7*  --  6.5* 6.5*   ALBUMIN 0.5*  --   --  0.6*   PROT  --   --   --  5.8*   BILITOT 1.2  --   --  1.2   ALKPHOS 234*  --   --  291*   ALT  --   --   --  23   AST  --   --   --  36   MG 2.1 2.1  --  2.0   PHOS 8.9* >9.0*  --   --        All pertinent labs within the past 24 hours have been reviewed.    Significant Imaging:  I have reviewed all pertinent imaging results/findings within the past 24 hours.

## 2022-01-08 NOTE — ASSESSMENT & PLAN NOTE
- s/p multiple debridements  - wound vac in place  - ID consulted for antibiotic management: He was treated with rocephin, daptomycin, clindamycin. 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

## 2022-01-09 PROBLEM — J95.851 VENTILATOR ASSOCIATED PNEUMONIA: Status: ACTIVE | Noted: 2022-01-01

## 2022-01-09 NOTE — ASSESSMENT & PLAN NOTE
- febrile over past 24 hours  - had been on almost a month of antimicrobials, high risk for MDR organisms. Gram negative on resp culture  - repeat blood cultures  - change unasyn to merrem

## 2022-01-09 NOTE — ASSESSMENT & PLAN NOTE
- nephrology consulted   - no sign of renal recovery.  Continuing with scheduled hemodialysis for this patient.  - tunneled HD line placement 12/30     HANK

## 2022-01-09 NOTE — SUBJECTIVE & OBJECTIVE
Interval History: Only on fentanyl. Pressor requirement has decreased. Febrile to 101.7 over past 24 hours.     Objective:     Vital Signs (Most Recent):  Temp: 100 °F (37.8 °C) (01/09/22 0506)  Pulse: 92 (01/09/22 0700)  Resp: 17 (01/09/22 0700)  BP: (!) 165/61 (01/09/22 0700)  SpO2: 97 % (01/09/22 0700) Vital Signs (24h Range):  Temp:  [98.4 °F (36.9 °C)-101.7 °F (38.7 °C)] 100 °F (37.8 °C)  Pulse:  [] 92  Resp:  [11-25] 17  SpO2:  [97 %-100 %] 97 %  BP: ()/(35-76) 165/61     Weight: 91.4 kg (201 lb 8 oz)  Body mass index is 28.1 kg/m².      Intake/Output Summary (Last 24 hours) at 1/9/2022 0817  Last data filed at 1/9/2022 0506  Gross per 24 hour   Intake 1984.33 ml   Output 1957 ml   Net 27.33 ml       Physical Exam  Vitals reviewed.   Constitutional:       Appearance: He is ill-appearing.      Comments: Sedated on fentanyl   HENT:      Head:      Comments: Right temporal deformity     Nose: Nose normal.      Mouth/Throat:      Mouth: Mucous membranes are moist.   Eyes:      Comments: enucleated right eye   Neck:      Comments: Tracheostomy in place  Cardiovascular:      Rate and Rhythm: Normal rate and regular rhythm.      Pulses: Normal pulses.      Heart sounds: Normal heart sounds.   Pulmonary:      Effort: No respiratory distress.      Breath sounds: No stridor. No wheezing, rhonchi or rales.      Comments: Ventilator driven breath sounds bilaterally. Coarse  Abdominal:      Palpations: Abdomen is soft.      Tenderness: There is no guarding.      Comments: Anterior abdominal wall with wound vac in place. Ostomy   Genitourinary:     Comments: Packing in place  Musculoskeletal:         General: No swelling or deformity.      Cervical back: Neck supple.      Right lower leg: No edema.      Left lower leg: No edema.   Lymphadenopathy:      Cervical: No cervical adenopathy.   Skin:     General: Skin is warm and dry.   Neurological:      Cranial Nerves: No cranial nerve deficit.      Comments: Sedated          Vents:  Vent Mode: A/C (01/09/22 0500)  Set Rate: 16 BPM (01/09/22 0500)  Vt Set: 480 mL (01/09/22 0500)  Pressure Support: 12 cmH20 (01/07/22 2230)  PEEP/CPAP: 8 cmH20 (01/09/22 0500)  Oxygen Concentration (%): 40 (01/09/22 0420)  Peak Airway Pressure: 18 cmH2O (01/09/22 0500)  Total Ve: 4.8 mL (01/09/22 0500)  F/VT Ratio<105 (RSBI): (!) 66.89 (01/09/22 0500)    Lines/Drains/Airways     Peripherally Inserted Central Catheter Line            PICC Double Lumen 01/05/22 left basilic 4 days          Central Venous Catheter Line                 Hemodialysis Catheter 12/30/21 0900 right internal jugular 9 days          Drain                 NG/OG Tube [place] sump 18 Fr. Left nostril -- days         Colostomy 12/18/21 1030 Descending/sigmoid LUQ 21 days          Airway                 Surgical Airway 01/04/22 Shiley 5 days          Peripheral Intravenous Line                 Peripheral IV - Single Lumen 01/08/22 1256 20 G Anterior;Left Forearm <1 day                Significant Labs:    CBC/Anemia Profile:  Recent Labs   Lab 01/08/22  0758   WBC 20.95*   HGB 5.9*   HCT 18.4*   *   MCV 92.0   RDW 15.9*        Chemistries:  Recent Labs   Lab 01/08/22  0758   *  127*   K 3.8  3.8   CL 97*  93*   CO2 21  20*   BUN 48*  49*   CREATININE 4.71*  4.85*   CALCIUM 7.2*  6.5*   ALBUMIN 0.5*  0.6*   PROT 5.8*   BILITOT 1.0  1.2   ALKPHOS 290*  291*   ALT 23   AST 36   MG 2.2  2.0   PHOS 6.7*       All pertinent labs within the past 24 hours have been reviewed.    Significant Imaging:  I have reviewed all pertinent imaging results/findings within the past 24 hours.

## 2022-01-09 NOTE — PROGRESS NOTES
[facility]  Pulmonology  Progress Note    Patient Name: Og Garcia  MRN: [id number]  Admission Date: 12/23/2021  Hospital Length of Stay: 17 days  Code Status: Prior  Attending Provider: Cecil Abernathy DO  Primary Care Provider: Hector Arndt DNP, FNP-C   Principal Problem: Denice gangrene    Subjective:     Interval History: Only on fentanyl. Pressor requirement has decreased. Febrile to 101.7 over past 24 hours.     Objective:     Vital Signs (Most Recent):  Temp: 100 °F (37.8 °C) (01/09/22 0506)  Pulse: 92 (01/09/22 0700)  Resp: 17 (01/09/22 0700)  BP: (!) 165/61 (01/09/22 0700)  SpO2: 97 % (01/09/22 0700) Vital Signs (24h Range):  Temp:  [98.4 °F (36.9 °C)-101.7 °F (38.7 °C)] 100 °F (37.8 °C)  Pulse:  [] 92  Resp:  [11-25] 17  SpO2:  [97 %-100 %] 97 %  BP: ()/(35-76) 165/61     Weight: 91.4 kg (201 lb 8 oz)  Body mass index is 28.1 kg/m².      Intake/Output Summary (Last 24 hours) at 1/9/2022 0817  Last data filed at 1/9/2022 0506  Gross per 24 hour   Intake 1984.33 ml   Output 1957 ml   Net 27.33 ml       Physical Exam  Vitals reviewed.   Constitutional:       Appearance: He is ill-appearing.      Comments: Sedated on fentanyl   HENT:      Head:      Comments: Right temporal deformity     Nose: Nose normal.      Mouth/Throat:      Mouth: Mucous membranes are moist.   Eyes:      Comments: enucleated right eye   Neck:      Comments: Tracheostomy in place  Cardiovascular:      Rate and Rhythm: Normal rate and regular rhythm.      Pulses: Normal pulses.      Heart sounds: Normal heart sounds.   Pulmonary:      Effort: No respiratory distress.      Breath sounds: No stridor. No wheezing, rhonchi or rales.      Comments: Ventilator driven breath sounds bilaterally. Coarse  Abdominal:      Palpations: Abdomen is soft.      Tenderness: There is no guarding.      Comments: Anterior abdominal wall with wound vac in place. Ostomy   Genitourinary:     Comments: Packing in  place  Musculoskeletal:         General: No swelling or deformity.      Cervical back: Neck supple.      Right lower leg: No edema.      Left lower leg: No edema.   Lymphadenopathy:      Cervical: No cervical adenopathy.   Skin:     General: Skin is warm and dry.   Neurological:      Cranial Nerves: No cranial nerve deficit.      Comments: Sedated         Vents:  Vent Mode: A/C (01/09/22 0500)  Set Rate: 16 BPM (01/09/22 0500)  Vt Set: 480 mL (01/09/22 0500)  Pressure Support: 12 cmH20 (01/07/22 2230)  PEEP/CPAP: 8 cmH20 (01/09/22 0500)  Oxygen Concentration (%): 40 (01/09/22 0420)  Peak Airway Pressure: 18 cmH2O (01/09/22 0500)  Total Ve: 4.8 mL (01/09/22 0500)  F/VT Ratio<105 (RSBI): (!) 66.89 (01/09/22 0500)    Lines/Drains/Airways     Peripherally Inserted Central Catheter Line            PICC Double Lumen 01/05/22 left basilic 4 days          Central Venous Catheter Line                 Hemodialysis Catheter 12/30/21 0900 right internal jugular 9 days          Drain                 NG/OG Tube [place] sump 18 Fr. Left nostril -- days         Colostomy 12/18/21 1030 Descending/sigmoid LUQ 21 days          Airway                 Surgical Airway 01/04/22 Shiley 5 days          Peripheral Intravenous Line                 Peripheral IV - Single Lumen 01/08/22 1256 20 G Anterior;Left Forearm <1 day                Significant Labs:    CBC/Anemia Profile:  Recent Labs   Lab 01/08/22  0758   WBC 20.95*   HGB 5.9*   HCT 18.4*   *   MCV 92.0   RDW 15.9*        Chemistries:  Recent Labs   Lab 01/08/22  0758   *  127*   K 3.8  3.8   CL 97*  93*   CO2 21  20*   BUN 48*  49*   CREATININE 4.71*  4.85*   CALCIUM 7.2*  6.5*   ALBUMIN 0.5*  0.6*   PROT 5.8*   BILITOT 1.0  1.2   ALKPHOS 290*  291*   ALT 23   AST 36   MG 2.2  2.0   PHOS 6.7*       All pertinent labs within the past 24 hours have been reviewed.    Significant Imaging:  I have reviewed all pertinent imaging results/findings within the past 24  hours.    Assessment/Plan:     * Denice gangrene  - s/p multiple debridements  - wound vac in place  - ID consulted for antibiotic management: He was treated with rocephin, daptomycin, clindamycin. 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 Gram negative on resp culture  - repeat blood cultures  - change unasyn to merrem    Shock  - remains on pressors  - actively weaning, I suspect he will be off today    Hyperphosphatemia  - initiate sevelamer    Type 2 diabetes mellitus without complication, without long-term current use of insulin  - continue basal/bolus insulin    RAHAT (acute kidney injury)  - nephrology consulted   - no sign of renal recovery.  Continuing with scheduled hemodialysis for this patient.  - tunneled HD line placement 12/30      On mechanically assisted ventilation  - 12/14 intubated, tracheostomy 1/4          Change abx to merrem and amphotericin. Trickle tube feeding today. NPO at MN for OR tomorrow.          Raul Hall MD  Pulmonology  [facility]

## 2022-01-09 NOTE — ASSESSMENT & PLAN NOTE
- s/p multiple debridements  - wound vac in place  - ID consulted for antibiotic management: He was treated with rocephin, daptomycin, clindamycin. 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

## 2022-01-09 NOTE — PLAN OF CARE
Problem: Adult Inpatient Plan of Care  Goal: Optimal Comfort and Wellbeing  Outcome: Ongoing, Progressing

## 2022-01-10 NOTE — PROGRESS NOTES
[facility]  Adult Nutrition  Follow-up Note         Reason for Assessment  Reason For Assessment: RD follow-up  Nutrition Risk Screen: large or nonhealing wound, burn or pressure injury,tube feeding or parenteral nutrition  Malnutrition  Is Patient Malnourished: No  Nutrition Diagnosis  Increased protein Needs   related to Decreased/ impaired skin integrity as evidenced by wounds    Nutrition Diagnosis Status: Improving      Nutrition Risk  Level of Risk/Frequency of Follow-up: high   Chewing or Swallowing Difficulty?: Swallowing difficulty  Estimated/Assessed Needs  RMR ([place]. Jeor Equation): 1716.13 Activity Factor: 1 Injury Factor: 1.2   Total Ve: 6.2 mL Temp: 97.3 °F (36.3 °C)Axillary  Weight Used For Calorie Calculations: 91.7 kg (202 lb 2.6 oz)   Energy Need Method: [place] (modified) Energy Calorie Requirements (kcal): 1613  Weight Used For Protein Calculations: 81 kg (178 lb 9.2 oz) (adjusted body wt)  Protein Requirements:   Estimated Fluid Requirement Method: RDA Method Fluid Requirements (mL): 1774  RDA Method (mL): 1613     Nutrition Prescription / Recommendations  Recommendation/Intervention: Increase TF to: Vital HP@ 30ml/hr; H20 flush of 25ml q 2hr; Continue to wean TPN  Goals: TF/ TPN tolerance; wound healing; meet estimated nutritional needs; wean TPN, increase TF to goal rate  Nutrition Goal Status: new  Communication of RD Recs: reviewed with physician  Current Diet Order: NPO/ parenteral and enteral nutrition  Nutrition Order Comments: TPN: Clinimix 5/20 @ 75ml/hr; 250ml lipids daily; Tube feeding: Vital HP @ 30ml/hr; H20 flush of 20ml q 2hr  Current Nutrition Support Formula Ordered: Clinimix E 5/20,Other (Comment) (vital HP)  Current Nutrition Support Rate Ordered: 2.5 (ml)  Current Nutrition Support Frequency Ordered: hourly  Recommended Diet: Enteral Nutrition and Parenteral Nutrition TF: vital HP @ 30ml/hr; H20 flush 20ml q 2hr; TPN: Clinimix 5/20 @  75ml/hr; 250ml lipids daily  Recommended Oral Supplement: No Oral Supplements  Is Nutrition Support Recommended: No  Is Education Recommended: No  Monitor and Evaluation  % current Intake: Enteral Nutrition progressing to goal and On PPN meeting 100% of Caloric needs and 100% of Protein needs  % intake to meet estimated needs: Enteral Nutrition  and Parenteral Nutrition  Food and Nutrient Intake: enteral nutrition intake,parenteral nutrition intake  Food and Nutrient Adminstration: enteral and parenteral nutrition administration  Anthropometric Measurements: height/length,body mass index,weight,weight change  Biochemical Data, Medical Tests and Procedures: electrolyte and renal panel,glucose/endocrine profile  Nutrition-Focused Physical Findings: skin  Enteral Calories (kcal): 720  Enteral Protein (gm): 63  Enteral (Free Water) Fluid (mL): 598  Free Water Flush Fluid (mL): 240  Parenteral Calories (kcal): 2084  Parenteral Protein (gm): 90  Parenteral Fluid (mL): 1800  Lipid Calories (kcals): 500 kcals  Other Calories (kcal): 528 (propofol)  Total Calories (kcal): 2804  Total Calories (kcal/kg): 2612  % Kcal Needs: 100  Total Protein (gm): 153  % Protein Needs: 100  IV Fluid (mL): 1764  Total Fluid Intake (mL): 2630  Energy Calories Required: exceeds needs  Protein Required: exceeds needs  Fluid Required: exceeds needs  Tolerance: tolerating  Current Medical Diagnosis and Past Medical History  Diagnosis: gastrointestinal disease,infection/sepsis  Past Medical History:   Diagnosis Date    Hyperlipidemia     Hypertension      Nutrition/Diet History  Spiritual, Cultural Beliefs, [employer]al Practices, Values that Affect Care: no  Food Allergies: NKFA  Factors Affecting Nutritional Intake: None identified at this time  Lab/Procedures/Meds  Recent Labs   Lab 01/08/22  0758 01/09/22  1100 01/10/22  0539   *  127*   < > 131*  131*   K 3.8  3.8   < > 3.8  3.7   BUN 48*  49*   < > 65*  65*   CREATININE 4.71*   "4.85*   < > 4.68*  5.11*   *  271*   < > 311*  318*   CALCIUM 7.2*  6.5*   < > 7.9*  7.4*   ALBUMIN 0.5*  0.6*  --  0.5*   CL 97*  93*   < > 101  98   ALT 23  --   --    AST 36  --   --    PHOS 6.7*   < > 4.6*    < > = values in this interval not displayed.     Last A1c: No results found for: HGBA1C  Lab Results   Component Value Date    RBC 2.28 (L) 01/10/2022    HGB 6.9 (L) 01/10/2022    HCT 19.4 (L) 01/10/2022    MCV 85.1 01/10/2022    MCH 30.3 01/10/2022    MCHC 35.6 01/10/2022     Pertinent Labs Reviewed: reviewed  Pertinent Labs Comments: Hct 19.4, Na 131, BUN 65, Creat 5.11, Glu 318, Alb .5  Pertinent Medications Reviewed: reviewed  Pertinent Medications Comments: heparin, insulin  Anthropometrics  Temp: 97.3 °F (36.3 °C)  Height Method: Stated  Height: 5' 11" (180.3 cm)  Height (inches): 71 in  Weight Method: Bed Scale  Weight: 91.4 kg (201 lb 8 oz)  Weight (lb): 201.5 lb  Ideal Body Weight (IBW), Male: 172 lb  % Ideal Body Weight, Male (lb): 115.36 %  BMI (Calculated): 28.1  BMI Grade: 25 - 29.9 - overweight     Nutrition by Nursing  Diet/Nutrition Received: NPO     Diet/Feeding Assistance: total feed  Diet/Feeding Tolerance: other (see comments) (tpn and trickle tube feeds)  Last Bowel Movement: 01/07/22 (on tpn/scant this sl green tint liquid)       NG/OG Tube [place] sump 18 Fr. Left nostril-Feeding Type: continuous,by pump       NG/OG Tube [place] sump 18 Fr. Left nostril-Current Rate (mL/hr): 15 mL/hr       NG/OG Tube [place] sump 18 Fr. Left nostril-Goal Rate (mL/hr):  (no goal/trickle feeds)       NG/OG Tube [place] sump 18 Fr. Left nostril-Formula Name: Vital HP  Nutrition Follow-Up  RD Follow-up?: Yes  Assessment and Plan  No new Assessment & Plan notes have been filed under this hospital service since the last note was generated.  Service: Nutrition     "

## 2022-01-10 NOTE — ASSESSMENT & PLAN NOTE
Continue wound care  1/5/2022.  Continue with wound debridement  1/6/2022 Plans for further wound debridement  1/7/2022 Now s/p wound washout  1/10/2022 Continued would debridement

## 2022-01-10 NOTE — ANESTHESIA PREPROCEDURE EVALUATION
01/10/2022  Og Garcia is a 66 y.o., male. Denice's gangrene    Pre-op Assessment    I have reviewed the Patient Summary Reports.     I have reviewed the Nursing Notes. I have reviewed the NPO Status.   I have reviewed the Medications.     Review of Systems  Anesthesia Hx:  No problems with previous Anesthesia    Social:  Non-Smoker, No Alcohol Use    Hematology/Oncology:  Hematology Normal   Oncology Normal     EENT/Dental:EENT/Dental Normal   Cardiovascular:   Hypertension    Pulmonary:   Pneumonia Respiratory failure / vent dependent   Renal/:   Chronic Renal Disease, Dialysis, ESRD    Hepatic/GI:  Hepatic/GI Normal    Musculoskeletal:  Musculoskeletal Normal Denice gangrene   Neurological:  Neurology Normal    Endocrine:   Diabetes, poorly controlled, type 2    Dermatological:  Skin Normal    Psych:  Psychiatric Normal                                                                                                                    01/10/2022  Og Garcia is a 66 y.o., male. Denice's gangrene    Pre-op Assessment    I have reviewed the Patient Summary Reports.     I have reviewed the Nursing Notes. I have reviewed the NPO Status.   I have reviewed the Medications.     Review of Systems  Anesthesia Hx:  No problems with previous Anesthesia    Social:  Non-Smoker, No Alcohol Use    Hematology/Oncology:  Hematology Normal   Oncology Normal     EENT/Dental:EENT/Dental Normal   Cardiovascular:   Hypertension Sepsis / on levophed   Pulmonary:  Pulmonary Normal Respiratory failure on vent   Renal/:   Chronic Renal Disease, ESRD, Dialysis    Hepatic/GI:  Hepatic/GI Normal Denice Gangrene, necrotizing fasciitis -  s/p multiple debridements   Musculoskeletal:  Musculoskeletal Normal    Neurological:  Neurology Normal    Endocrine:  Endocrine Normal    Dermatological:  Skin Normal     Psych:  Psychiatric Normal           Physical Exam  General:  Well nourished    Airway/Jaw/Neck:  Airway Findings: Mouth Opening: Normal General Airway Assessment: Adult  Mallampati: III     Eyes/Ears/Nose:  Eyes/Ears/Nose Findings:     Chest/Lungs:  Chest/Lungs Findings: Rales, Basilar     Heart/Vascular:  Heart Findings: Rate: Normal  Rhythm: Regular Rhythm        Mental Status:  Mental Status Findings:  Cooperative, Alert and Oriented         Anesthesia Plan  Type of Anesthesia, risks & benefits discussed:  Anesthesia Type:  general    Patient's Preference:   Plan Factors:          Intra-op Monitoring Plan: standard ASA monitors  Intra-op Monitoring Plan Comments:   Post Op Pain Control Plan: per primary service following discharge from PACU and multimodal analgesia  Post Op Pain Control Plan Comments:     Induction:   IV  Beta Blocker:  Patient is not currently on a Beta-Blocker (No further documentation required).       Informed Consent: Patient understands risks and agrees with Anesthesia plan.  Questions answered. Anesthesia consent signed with patient.  ASA Score: 4  emergent   Day of Surgery Review of History & Physical: I have interviewed and examined the patient. I have reviewed the patient's H&P dated:  There are no significant changes.          Ready For Surgery From Anesthesia Perspective.           Physical Exam  General:  Well nourished    Airway/Jaw/Neck:  Airway Findings: Mouth Opening: Normal General Airway Assessment: Adult  Mallampati: III     Eyes/Ears/Nose:  Eyes/Ears/Nose Findings:     Chest/Lungs:  Chest/Lungs Findings: Rales, Basilar     Heart/Vascular:  Heart Findings: Rate: Tachycardia  Rhythm: Regular Rhythm        Mental Status:  Mental Status Findings:  Cooperative, Alert and Oriented         Anesthesia Plan  Type of Anesthesia, risks & benefits discussed:  Anesthesia Type:  general    Patient's Preference:   Plan Factors:          Intra-op Monitoring Plan: standard ASA  monitors  Intra-op Monitoring Plan Comments:   Post Op Pain Control Plan: per primary service following discharge from PACU and multimodal analgesia  Post Op Pain Control Plan Comments:     Induction:   IV  Beta Blocker:  Patient is not currently on a Beta-Blocker (No further documentation required).       Informed Consent: Patient understands risks and agrees with Anesthesia plan.  Questions answered. Anesthesia consent signed with patient.  ASA Score: 4     Day of Surgery Review of History & Physical: I have interviewed and examined the patient. I have reviewed the patient's H&P dated:  There are no significant changes.          Ready For Surgery From Anesthesia Perspective.

## 2022-01-10 NOTE — INTERVAL H&P NOTE
The patient has been examined and the H&P has been reviewed:    I concur with the findings and no changes have occurred since H&P was written.    Patient has been having some intermittent episodes of bradycardia currently not on any pressors.  White count is slightly up.  Will need to go to the OR for additional washout hopefully his abdominal mucormycosis ease will be under control shortly.    Active Hospital Problems    Diagnosis  POA    *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]  Yes    Ventilator associated pneumonia [J95.851]  No    Shock [R57.9]  Yes    Hyperphosphatemia [E83.39]  No    Acute blood loss anemia [D62]  No    Type 2 diabetes mellitus without complication, without long-term current use of insulin [E11.9]  Yes    Necrotizing fasciitis [M72.6]  Yes    Hypocalcemia [E83.51]  Yes    RAHAT (acute kidney injury) [N17.9]  Yes    On mechanically assisted ventilation [Z99.11]  Not Applicable    Hypertension [I10]  Yes      Resolved Hospital Problems   No resolved problems to display.

## 2022-01-10 NOTE — OP NOTE
[facility], [place] - Periop Services  Surgery Department  Operative Note    SUMMARY     Date of Procedure: 1/10/2022     Procedure: Procedure(s) (LRB):  WASHOUT (N/A)     Surgeon(s) and Role:     * Harish Cazares MD - Primary    Assisting Surgeon: None    Pre-Operative Diagnosis: Necrotizing fasciitis [M72.6]    Post-Operative Diagnosis: Post-Op Diagnosis Codes:     * Necrotizing fasciitis [M72.6]    Anesthesia: General    Procedures Performed: Evacuation of intra-abdominal hematoma with washout 2) Debridement of perirectal wound    Significant Findings of the Procedure:  About 200 cc of old clotted blood identified under the wound VAC in the right pericolic gutter consistent with a bleed that had stopped on its own from the previous surgery.  Green slough identified in the perirectal wound debrided with a curette.    Procedure in Detail:  After informed consent patient brought to the OR prepped and draped in the usual sterile fashion.  The old wound VAC was removed and there was found to be a hematoma underneath the wound VAC in the abdominal cavity.  This was a thin film over the omentum and tracked down into the right pericolic area and the pelvic region.  Probably about 200 cc well clotted blood was removed and we ensured there was no active bleeding in the area.  The tissues all appeared relatively viable with no evidence of continued purulence or necrotic tissues that was obvious.  Pretty decent granulation tissues seen in the deep aspects and we irrigated all this area out we did have to place a Kerlix back into this deep pockets and a black sponge was placed on the omental area.  Will likely have to bring the patient back in 2 days time for a dressing change with the proper ABThera wound VAC.  he tolerated that portion well after we dressed areas.  Patient was also lithotomy for the rectal area and in the rectum area on the right perirectal area was areas of slough with green drainage consistent with this  mucormycosis.  We debrided all this area and approximately 8 x 5 x 1 cm area of fibrinous muscle and tissues was a debrided back to good healthy bleeding tissues.  We irrigated out with Dakin's solution and placed a wet to dry within the wound bed.  Area was dressed and patient tolerated the procedure well.    Complications: No    Estimated Blood Loss (EBL): * No values recorded between 1/10/2022  2:24 PM and 1/10/2022  3:15 PM *           Implants: * No implants in log *    Specimens:   Specimen (24h ago, onward)            None                  Condition: Good    Disposition: PACU - hemodynamically stable.    Attestation: I was present and scrubbed for the entire procedure.

## 2022-01-10 NOTE — ASSESSMENT & PLAN NOTE
- H/H down to 5.5/16 - pt had bleeding from his HD insertion site yesterday this has now resolved.   - will transfuse 2 units PRBCs on HD  1/2- 9.7/27.5 - repeat CBC in AM  1/4- Hgb is 7.2. Not sure if he will be transfused during surgery. IF not we will try to get  Him transfused with next HD  01/06/2021 transfuse 1 unit of packed red blood cells today  01/10/2021 needs a unit of blood

## 2022-01-10 NOTE — SUBJECTIVE & OBJECTIVE
Interval History: The patient is now back from surgery.  He is s/p hemodialysis today.  Situation remains critical.    Review of patient's allergies indicates:  No Known Allergies  Current Facility-Administered Medications   Medication Frequency    0.9%  NaCl infusion (for blood administration) Q24H PRN    0.9%  NaCl infusion PRN    acetaminophen tablet 500 mg Q6H PRN    albuterol nebulizer solution 2.5 mg Q4H PRN    amino acid 5% - dextrose 20% solution with custom electrolytes & additives Continuous    amino acid 5% - dextrose 20% solution with custom electrolytes & additives Continuous    amphotericin B liposome (AMBISOME) 450 mg in dextrose 5 % 450 mL IVPB Q24H    dextrose 50% injection 12.5 g PRN    fat emulsion 20% infusion 250 mL Daily    fentaNYL 2500 mcg in D5W 250 mL infusion premix (titrating) (conc: 10 mcg/mL) PRN    glucagon (human recombinant) injection 1 mg PRN    heparin (porcine) injection 4,000 Units PRN    heparin (porcine) injection 5,000 Units Q8H    insulin aspart U-100 injection 1-10 Units Q6H    insulin detemir U-100 injection 20 Units QHS    [START ON 1/11/2022] meropenem (MERREM) 500 mg in sodium chloride 0.9 % 100 mL IVPB (MB+) Q24H    metoprolol injection 5 mg Q5 Min PRN    NORepinephrine 32 mg in dextrose 5 % 250 mL infusion PRN    pantoprazole suspension 40 mg Daily    sevelamer carbonate pwpk 0.8 g TID WM    sodium chloride 0.9% bolus 250 mL PRN    ticagrelor tablet 90 mg BID       Objective:     Vital Signs (Most Recent):  Temp: 98.4 °F (36.9 °C) (01/10/22 1600)  Pulse: 98 (01/10/22 1630)  Resp: 17 (01/10/22 1630)  BP: 122/62 (01/10/22 1630)  SpO2: 100 % (01/10/22 1630)  O2 Device (Oxygen Therapy): ventilator (01/10/22 1147) Vital Signs (24h Range):  Temp:  [97 °F (36.1 °C)-98.4 °F (36.9 °C)] 98.4 °F (36.9 °C)  Pulse:  [] 98  Resp:  [12-23] 17  SpO2:  [94 %-100 %] 100 %  BP: ()/() 122/62     Weight: 91.4 kg (201 lb 8 oz) (01/09/22 0506)  Body  mass index is 28.1 kg/m².  Body surface area is 2.14 meters squared.    I/O last 3 completed shifts:  In: 4927.5 [I.V.:500; Blood:1136; NG/GT:300; IV Piggyback:1400]  Out: 1500 [Other:1500]    Physical Exam  Vitals reviewed.   Constitutional:       Appearance: He is ill-appearing.   Cardiovascular:      Rate and Rhythm: Normal rate and regular rhythm.   Pulmonary:      Breath sounds: Normal breath sounds.      Comments: The patient is ventilated  Abdominal:      Palpations: Abdomen is soft.         Significant Labs:  BMP:   Recent Labs   Lab 01/10/22  0539   *  318*   *  131*   K 3.8  3.7     98   CO2 19*  20*   BUN 65*  65*   CREATININE 4.68*  5.11*   CALCIUM 7.9*  7.4*   MG 2.1     CBC:   Recent Labs   Lab 01/10/22  0539   WBC 18.76*   RBC 2.28*   HGB 6.9*   HCT 19.4*   PLT 92*   MCV 85.1   MCH 30.3   MCHC 35.6        Significant Imaging:  Labs: Reviewed

## 2022-01-10 NOTE — ANESTHESIA POSTPROCEDURE EVALUATION
Anesthesia Post Evaluation    Patient: Og [facility]    Procedure(s) Performed: Procedure(s) (LRB):  WASHOUT (N/A)    Final Anesthesia Type: general      Patient location during evaluation: PACU  Patient participation: Yes- Able to Participate  Level of consciousness: awake and sedated  Post-procedure vital signs: reviewed and stable  Pain management: adequate  Airway patency: patent    PONV status at discharge: No PONV  Anesthetic complications: no      Cardiovascular status: blood pressure returned to baseline  Respiratory status: unassisted  Hydration status: euvolemic  Follow-up not needed.          Vitals Value Taken Time   /62 01/10/22 1630   Temp 36.9 °C (98.4 °F) 01/10/22 1600   Pulse 98 01/10/22 1630   Resp 17 01/10/22 1630   SpO2 100 % 01/10/22 1630         Event Time   Out of Recovery 15:35:00         Pain/Rio Score: Pain Rating Prior to Med Admin: 2 (1/9/2022 10:14 AM)  Rio Score: 6 (1/10/2022  3:34 PM)

## 2022-01-10 NOTE — PLAN OF CARE
Problem: Nutrition Impaired (Sepsis/Septic Shock)  Goal: Optimal Nutrition Intake  Outcome: Ongoing, Progressing     Problem: Nutrition Impaired (Sepsis/Septic Shock)  Goal: Optimal Nutrition Intake  Outcome: Ongoing, Progressing

## 2022-01-10 NOTE — ASSESSMENT & PLAN NOTE
- nephrology consulted   - no sign of renal recovery.  Continuing with scheduled hemodialysis for this patient.  - tunneled HD line placement 12/30  Continues with dialysis

## 2022-01-10 NOTE — PROGRESS NOTES
[facility]  Pulmonology  Progress Note    Patient Name: Og Garcia  MRN: [id number]  Admission Date: 12/23/2021  Hospital Length of Stay: 18 days  Code Status: Prior  Attending Provider: Cecil Abernathy DO  Primary Care Provider: Hector Arndt DNP, FNP-C   Principal Problem: Denice gangrene    Subjective:     Interval History:  Sedated    Objective:     Vital Signs (Most Recent):  Temp: 97.6 °F (36.4 °C) (01/10/22 1000)  Pulse: 92 (01/10/22 1145)  Resp: 17 (01/10/22 1145)  BP: 127/69 (01/10/22 1145)  SpO2: 100 % (01/10/22 1145) Vital Signs (24h Range):  Temp:  [97.3 °F (36.3 °C)-98.3 °F (36.8 °C)] 97.6 °F (36.4 °C)  Pulse:  [] 92  Resp:  [12-21] 17  SpO2:  [94 %-100 %] 100 %  BP: ()/(36-78) 127/69     Weight: 91.4 kg (201 lb 8 oz)  Body mass index is 28.1 kg/m².      Intake/Output Summary (Last 24 hours) at 1/10/2022 1147  Last data filed at 1/10/2022 0600  Gross per 24 hour   Intake 3500 ml   Output 1200 ml   Net 2300 ml       Physical Exam  Vitals reviewed.   Constitutional:       Appearance: Normal appearance. He is ill-appearing.      Interventions: He is intubated.   HENT:      Head: Normocephalic and atraumatic.      Nose: Nose normal.      Mouth/Throat:      Mouth: Mucous membranes are dry.      Pharynx: Oropharynx is clear.   Eyes:      Extraocular Movements: Extraocular movements intact.      Conjunctiva/sclera: Conjunctivae normal.      Pupils: Pupils are equal, round, and reactive to light.   Cardiovascular:      Rate and Rhythm: Normal rate.      Heart sounds: Normal heart sounds. No murmur heard.      Pulmonary:      Effort: Pulmonary effort is normal. He is intubated.      Breath sounds: Normal breath sounds.   Abdominal:      General: Abdomen is flat. Bowel sounds are normal.      Palpations: Abdomen is soft.   Musculoskeletal:         General: Normal range of motion.      Cervical back: Normal range of motion and neck supple.      Right lower leg: No  edema.      Left lower leg: No edema.   Skin:     General: Skin is warm and dry.      Capillary Refill: Capillary refill takes less than 2 seconds.   Neurological:      General: No focal deficit present.      Mental Status: He is alert and oriented to person, place, and time.   Psychiatric:         Mood and Affect: Mood normal.         Behavior: Behavior normal.         Vents:  Vent Mode: A/C (01/10/22 0836)  Set Rate: 16 BPM (01/10/22 0836)  Vt Set: 80 mL (01/10/22 0836)  Pressure Support: 12 cmH20 (01/07/22 2230)  PEEP/CPAP: 5 cmH20 (01/10/22 0836)  Oxygen Concentration (%): 40 (01/10/22 0430)  Peak Airway Pressure: 18 cmH2O (01/10/22 0836)  Total Ve: 6.2 mL (01/10/22 0836)  F/VT Ratio<105 (RSBI): (!) 58.33 (01/09/22 1615)    Lines/Drains/Airways     Peripherally Inserted Central Catheter Line            PICC Double Lumen 01/05/22 left basilic 5 days          Central Venous Catheter Line                 Hemodialysis Catheter 12/30/21 0900 right internal jugular 11 days          Drain                 NG/OG Tube Mathews sump 18 Fr. Left nostril -- days         Colostomy 12/18/21 1030 Descending/sigmoid LUQ 23 days          Airway                 Surgical Airway 01/04/22 Shiley 6 days          Peripheral Intravenous Line                 Peripheral IV - Single Lumen 01/08/22 1256 20 G Anterior;Left Forearm 1 day                Significant Labs:    CBC/Anemia Profile:  Recent Labs   Lab 01/09/22  1100 01/09/22  1819 01/10/22  0539   WBC 17.52* 12.68* 18.76*   HGB 5.6* 7.0* 6.9*   HCT 16.4* 21.3* 19.4*   * 72* 92*   MCV 85.0 89.5 85.1   RDW 15.7* 15.9* 15.5*        Chemistries:  Recent Labs   Lab 01/09/22  1100 01/10/22  0539   * 131*  131*   K 3.5 3.8  3.7   CL 99 101  98   CO2 24 19*  20*   BUN 48* 65*  65*   CREATININE 4.24* 4.68*  5.11*   CALCIUM 7.1* 7.9*  7.4*   ALBUMIN  --  0.5*   BILITOT  --  0.9   ALKPHOS  --  278*   MG 2.1 2.1   PHOS 5.2* 4.6*       All pertinent labs within the past 24 hours  have been reviewed.    Significant Imaging:  I have reviewed all pertinent imaging results/findings within the past 24 hours.    Assessment/Plan:     * Denice gangrene  - s/p multiple debridements  - wound vac in place  - ID consulted for antibiotic management: He was treated with rocephin, daptomycin, clindamycin. 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  Continuing with scheduled hemodialysis for this patient.  - tunneled HD line placement 12/30  Continues with dialysis      Type 2 diabetes mellitus without complication, without long-term current use of insulin  - continue basal/bolus insulin    Acute blood loss anemia  - H/H down to 5.5/16 - pt had bleeding from his HD insertion site yesterday this has now resolved.   - will transfuse 2 units PRBCs on HD  1/2- 9.7/27.5 - repeat CBC in AM  1/4- Hgb is 7.2. Not sure if he will be transfused during surgery. IF not we will try to get  Him transfused with next HD  01/06/2021 transfuse 1 unit of packed red blood cells today  01/10/2021 needs a unit of blood                 Jose Urban MD  Pulmonology  [facility]

## 2022-01-10 NOTE — ASSESSMENT & PLAN NOTE
- s/p multiple debridements  - wound vac in place  - ID consulted for antibiotic management: He was treated with rocephin, daptomycin, clindamycin. 12/21- change clindamycin to ampicillin and treat for another week  - remains on ampicillin, this was changed to merrem 1/9  - pathology with fungal species consistent with mucormycosis initiated on amphotericin  - plan for OR tomorrow  Patient receiving amphotericin having spells of bradycardia will repeat blood cultures and do a echo to look for endocarditis

## 2022-01-10 NOTE — PROGRESS NOTES
[facility]  Nephrology  Progress Note    Patient Name: Og Garcia  MRN: [id number]  Admission Date: 12/23/2021  Hospital Length of Stay: 18 days  Attending Provider: Cecil Abernathy DO   Primary Care Physician: Hector Arndt DNP, FNP-C  Principal Problem:Denice gangrene    Subjective:     HPI: The patient is known from the previous nephrology consult at [facility].  He is no at Specialty and continues to need dialysis support.      Interval History: The patient is now back from surgery.  He is s/p hemodialysis today.  Situation remains critical.    Review of patient's allergies indicates:  No Known Allergies  Current Facility-Administered Medications   Medication Frequency    0.9%  NaCl infusion (for blood administration) Q24H PRN    0.9%  NaCl infusion PRN    acetaminophen tablet 500 mg Q6H PRN    albuterol nebulizer solution 2.5 mg Q4H PRN    amino acid 5% - dextrose 20% solution with custom electrolytes & additives Continuous    amino acid 5% - dextrose 20% solution with custom electrolytes & additives Continuous    amphotericin B liposome (AMBISOME) 450 mg in dextrose 5 % 450 mL IVPB Q24H    dextrose 50% injection 12.5 g PRN    fat emulsion 20% infusion 250 mL Daily    fentaNYL 2500 mcg in D5W 250 mL infusion premix (titrating) (conc: 10 mcg/mL) PRN    glucagon (human recombinant) injection 1 mg PRN    heparin (porcine) injection 4,000 Units PRN    heparin (porcine) injection 5,000 Units Q8H    insulin aspart U-100 injection 1-10 Units Q6H    insulin detemir U-100 injection 20 Units QHS    [START ON 1/11/2022] meropenem (MERREM) 500 mg in sodium chloride 0.9 % 100 mL IVPB (MB+) Q24H    metoprolol injection 5 mg Q5 Min PRN    NORepinephrine 32 mg in dextrose 5 % 250 mL infusion PRN    pantoprazole suspension 40 mg Daily    sevelamer carbonate pwpk 0.8 g TID WM    sodium chloride 0.9% bolus 250 mL PRN    ticagrelor tablet 90 mg BID       Objective:     Vital  Signs (Most Recent):  Temp: 98.4 °F (36.9 °C) (01/10/22 1600)  Pulse: 98 (01/10/22 1630)  Resp: 17 (01/10/22 1630)  BP: 122/62 (01/10/22 1630)  SpO2: 100 % (01/10/22 1630)  O2 Device (Oxygen Therapy): ventilator (01/10/22 1147) Vital Signs (24h Range):  Temp:  [97 °F (36.1 °C)-98.4 °F (36.9 °C)] 98.4 °F (36.9 °C)  Pulse:  [] 98  Resp:  [12-23] 17  SpO2:  [94 %-100 %] 100 %  BP: ()/() 122/62     Weight: 91.4 kg (201 lb 8 oz) (01/09/22 0506)  Body mass index is 28.1 kg/m².  Body surface area is 2.14 meters squared.    I/O last 3 completed shifts:  In: 4927.5 [I.V.:500; Blood:1136; NG/GT:300; IV Piggyback:1400]  Out: 1500 [Other:1500]    Physical Exam  Vitals reviewed.   Constitutional:       Appearance: He is ill-appearing.   Cardiovascular:      Rate and Rhythm: Normal rate and regular rhythm.   Pulmonary:      Breath sounds: Normal breath sounds.      Comments: The patient is ventilated  Abdominal:      Palpations: Abdomen is soft.         Significant Labs:  BMP:   Recent Labs   Lab 01/10/22  0539   *  318*   *  131*   K 3.8  3.7     98   CO2 19*  20*   BUN 65*  65*   CREATININE 4.68*  5.11*   CALCIUM 7.9*  7.4*   MG 2.1     CBC:   Recent Labs   Lab 01/10/22  0539   WBC 18.76*   RBC 2.28*   HGB 6.9*   HCT 19.4*   PLT 92*   MCV 85.1   MCH 30.3   MCHC 35.6        Significant Imaging:  Labs: Reviewed    Assessment/Plan:     * Denice gangrene  Continue wound care  1/5/2022.  Continue with wound debridement  1/6/2022 Plans for further wound debridement  1/7/2022 Now s/p wound washout  1/10/2022 Continued would debridement    RAHAT (acute kidney injury)  Dialysis Monday   Continue with scheduled hemodialysis for this patient.  1/5/2022 Dialysis done today.  No other changes  1/6/2022 Continue with dialysis on tomorrow.  1/7/2022  Dialysis as scheduled today.  And dialysis on tomorrow.  1/10/2022 Dialysis as scheduled.        Thank you for your consult. I will follow-up with  patient. Please contact us if you have any additional questions.    Edwin Pryor Jr, MD  Nephrology  [facility]

## 2022-01-10 NOTE — SUBJECTIVE & OBJECTIVE
Interval History:  Sedated    Objective:     Vital Signs (Most Recent):  Temp: 97.6 °F (36.4 °C) (01/10/22 1000)  Pulse: 92 (01/10/22 1145)  Resp: 17 (01/10/22 1145)  BP: 127/69 (01/10/22 1145)  SpO2: 100 % (01/10/22 1145) Vital Signs (24h Range):  Temp:  [97.3 °F (36.3 °C)-98.3 °F (36.8 °C)] 97.6 °F (36.4 °C)  Pulse:  [] 92  Resp:  [12-21] 17  SpO2:  [94 %-100 %] 100 %  BP: ()/(36-78) 127/69     Weight: 91.4 kg (201 lb 8 oz)  Body mass index is 28.1 kg/m².      Intake/Output Summary (Last 24 hours) at 1/10/2022 1147  Last data filed at 1/10/2022 0600  Gross per 24 hour   Intake 3500 ml   Output 1200 ml   Net 2300 ml       Physical Exam  Vitals reviewed.   Constitutional:       Appearance: Normal appearance. He is ill-appearing.      Interventions: He is intubated.   HENT:      Head: Normocephalic and atraumatic.      Nose: Nose normal.      Mouth/Throat:      Mouth: Mucous membranes are dry.      Pharynx: Oropharynx is clear.   Eyes:      Extraocular Movements: Extraocular movements intact.      Conjunctiva/sclera: Conjunctivae normal.      Pupils: Pupils are equal, round, and reactive to light.   Cardiovascular:      Rate and Rhythm: Normal rate.      Heart sounds: Normal heart sounds. No murmur heard.      Pulmonary:      Effort: Pulmonary effort is normal. He is intubated.      Breath sounds: Normal breath sounds.   Abdominal:      General: Abdomen is flat. Bowel sounds are normal.      Palpations: Abdomen is soft.   Musculoskeletal:         General: Normal range of motion.      Cervical back: Normal range of motion and neck supple.      Right lower leg: No edema.      Left lower leg: No edema.   Skin:     General: Skin is warm and dry.      Capillary Refill: Capillary refill takes less than 2 seconds.   Neurological:      General: No focal deficit present.      Mental Status: He is alert and oriented to person, place, and time.   Psychiatric:         Mood and Affect: Mood normal.         Behavior:  Behavior normal.         Vents:  Vent Mode: A/C (01/10/22 0836)  Set Rate: 16 BPM (01/10/22 0836)  Vt Set: 80 mL (01/10/22 0836)  Pressure Support: 12 cmH20 (01/07/22 2230)  PEEP/CPAP: 5 cmH20 (01/10/22 0836)  Oxygen Concentration (%): 40 (01/10/22 0430)  Peak Airway Pressure: 18 cmH2O (01/10/22 0836)  Total Ve: 6.2 mL (01/10/22 0836)  F/VT Ratio<105 (RSBI): (!) 58.33 (01/09/22 1615)    Lines/Drains/Airways     Peripherally Inserted Central Catheter Line            PICC Double Lumen 01/05/22 left basilic 5 days          Central Venous Catheter Line                 Hemodialysis Catheter 12/30/21 0900 right internal jugular 11 days          Drain                 NG/OG Tube Carpio sump 18 Fr. Left nostril -- days         Colostomy 12/18/21 1030 Descending/sigmoid LUQ 23 days          Airway                 Surgical Airway 01/04/22 Shiley 6 days          Peripheral Intravenous Line                 Peripheral IV - Single Lumen 01/08/22 1256 20 G Anterior;Left Forearm 1 day                Significant Labs:    CBC/Anemia Profile:  Recent Labs   Lab 01/09/22  1100 01/09/22  1819 01/10/22  0539   WBC 17.52* 12.68* 18.76*   HGB 5.6* 7.0* 6.9*   HCT 16.4* 21.3* 19.4*   * 72* 92*   MCV 85.0 89.5 85.1   RDW 15.7* 15.9* 15.5*        Chemistries:  Recent Labs   Lab 01/09/22  1100 01/10/22  0539   * 131*  131*   K 3.5 3.8  3.7   CL 99 101  98   CO2 24 19*  20*   BUN 48* 65*  65*   CREATININE 4.24* 4.68*  5.11*   CALCIUM 7.1* 7.9*  7.4*   ALBUMIN  --  0.5*   BILITOT  --  0.9   ALKPHOS  --  278*   MG 2.1 2.1   PHOS 5.2* 4.6*       All pertinent labs within the past 24 hours have been reviewed.    Significant Imaging:  I have reviewed all pertinent imaging results/findings within the past 24 hours.

## 2022-01-10 NOTE — PLAN OF CARE
Problem: Adult Inpatient Plan of Care  Goal: Plan of Care Review  Outcome: Ongoing, Progressing  Goal: Patient-Specific Goal (Individualized)  Outcome: Ongoing, Progressing  Goal: Absence of Hospital-Acquired Illness or Injury  Outcome: Ongoing, Progressing  Goal: Optimal Comfort and Wellbeing  Outcome: Ongoing, Progressing  Goal: Readiness for Transition of Care  Outcome: Ongoing, Progressing     Problem: Adjustment to Illness (Sepsis/Septic Shock)  Goal: Optimal Coping  Outcome: Ongoing, Progressing     Problem: Bleeding (Sepsis/Septic Shock)  Goal: Absence of Bleeding  Outcome: Ongoing, Progressing     Problem: Glycemic Control Impaired (Sepsis/Septic Shock)  Goal: Blood Glucose Level Within Desired Range  Outcome: Ongoing, Progressing     Problem: Infection Progression (Sepsis/Septic Shock)  Goal: Absence of Infection Signs and Symptoms  Outcome: Ongoing, Progressing     Problem: Nutrition Impaired (Sepsis/Septic Shock)  Goal: Optimal Nutrition Intake  Outcome: Ongoing, Progressing     Problem: Fluid and Electrolyte Imbalance (Acute Kidney Injury/Impairment)  Goal: Fluid and Electrolyte Balance  Outcome: Ongoing, Progressing     Problem: Oral Intake Inadequate (Acute Kidney Injury/Impairment)  Goal: Optimal Nutrition Intake  Outcome: Ongoing, Progressing     Problem: Renal Function Impairment (Acute Kidney Injury/Impairment)  Goal: Effective Renal Function  Outcome: Ongoing, Progressing     Problem: Infection  Goal: Absence of Infection Signs and Symptoms  Outcome: Ongoing, Progressing     Problem: Fall Injury Risk  Goal: Absence of Fall and Fall-Related Injury  Outcome: Ongoing, Progressing     Problem: Communication Impairment (Mechanical Ventilation, Invasive)  Goal: Effective Communication  Outcome: Ongoing, Progressing     Problem: Device-Related Complication Risk (Mechanical Ventilation, Invasive)  Goal: Optimal Device Function  Outcome: Ongoing, Progressing     Problem: Inability to Wean (Mechanical  Ventilation, Invasive)  Goal: Mechanical Ventilation Liberation  Outcome: Ongoing, Progressing     Problem: Nutrition Impairment (Mechanical Ventilation, Invasive)  Goal: Optimal Nutrition Delivery  Outcome: Ongoing, Progressing     Problem: Skin and Tissue Injury (Mechanical Ventilation, Invasive)  Goal: Absence of Device-Related Skin and Tissue Injury  Outcome: Ongoing, Progressing     Problem: Ventilator-Induced Lung Injury (Mechanical Ventilation, Invasive)  Goal: Absence of Ventilator-Induced Lung Injury  Outcome: Ongoing, Progressing     Problem: Communication Impairment (Artificial Airway)  Goal: Effective Communication  Outcome: Ongoing, Progressing     Problem: Device-Related Complication Risk (Artificial Airway)  Goal: Optimal Device Function  Outcome: Ongoing, Progressing     Problem: Skin and Tissue Injury (Artificial Airway)  Goal: Absence of Device-Related Skin or Tissue Injury  Outcome: Ongoing, Progressing     Problem: Noninvasive Ventilation Acute  Goal: Effective Unassisted Ventilation and Oxygenation  Outcome: Ongoing, Progressing     Problem: Skin Injury Risk Increased  Goal: Skin Health and Integrity  Outcome: Ongoing, Progressing     Problem: Device-Related Complication Risk (Hemodialysis)  Goal: Safe, Effective Therapy Delivery  Outcome: Ongoing, Progressing     Problem: Hemodynamic Instability (Hemodialysis)  Goal: Effective Tissue Perfusion  Outcome: Ongoing, Progressing     Problem: Infection (Hemodialysis)  Goal: Absence of Infection Signs and Symptoms  Outcome: Ongoing, Progressing     Problem: Diabetes Comorbidity  Goal: Blood Glucose Level Within Targeted Range  Outcome: Ongoing, Progressing     Problem: Impaired Wound Healing  Goal: Optimal Wound Healing  Outcome: Ongoing, Progressing

## 2022-01-10 NOTE — PROGRESS NOTES
Pharmacist Renal Dose Adjustment Note    Og Garcia is a 66 y.o. male being treated with the medication meropenem    Patient Data:    Vital Signs (Most Recent):  Temp: 97.3 °F (36.3 °C) (01/10/22 0800)  Pulse: 92 (01/10/22 0700)  Resp: 13 (01/10/22 0700)  BP: (!) 151/67 (01/10/22 0700)  SpO2: 100 % (01/10/22 0700)   Vital Signs (72h Range):  Temp:  [97.3 °F (36.3 °C)-101.7 °F (38.7 °C)]   Pulse:  []   Resp:  [9-25]   BP: ()/(33-78)   SpO2:  [94 %-100 %]      Recent Labs   Lab 01/08/22  0758 01/09/22  1100 01/10/22  0539   CREATININE 4.71*  4.85* 4.24* 4.68*  5.11*     Serum creatinine: 5.11 mg/dL (H) 01/10/22 0539  Estimated creatinine clearance: 16.4 mL/min (A)    Medication:meropehen dose: 1 gram frequency q8h will be changed to medication:meropenem dose:500 mg frequency:q24h    Pharmacist's Name: Kavitha Golden  Pharmacist's Extension: [phone]

## 2022-01-10 NOTE — TRANSFER OF CARE
Anesthesia Transfer of Care Note    Patient: Og Wolf Garcia    Procedure(s) Performed: Procedure(s) (LRB):  WASHOUT (N/A)    Patient location: Other: [facility]    Anesthesia Type: general    Transport from OR: Transported from OR intubated on 100% O2 by AMBU with assisted ventilation. Continuous ECG monitoring in transport. Continuous SpO2 monitoring in transport    Post pain: adequate analgesia    Post assessment: no apparent anesthetic complications    Post vital signs: stable    Level of consciousness: unresponsive    Nausea/Vomiting: no nausea/vomiting    Complications: none    Transfer of care protocol was followed      Last vitals:   Visit Vitals  BP (!) 148/83   Pulse 100   Temp 36.1 °C (97 °F)   Resp 14   SpO2 100%

## 2022-01-11 NOTE — ASSESSMENT & PLAN NOTE
- H/H down to 5.5/16 - pt had bleeding from his HD insertion site yesterday this has now resolved.   - will transfuse 2 units PRBCs on HD  1/2- 9.7/27.5 - repeat CBC in AM  1/4- Hgb is 7.2. Not sure if he will be transfused during surgery. IF not we will try to get  Him transfused with next HD  01/06/2021 transfuse 1 unit of packed red blood cells today  01/10/2021 needs a unit of blood  1/11- repeat H&H in am. Continue to transfuse as needed.

## 2022-01-11 NOTE — ASSESSMENT & PLAN NOTE
Dialysis Monday   Continue with scheduled hemodialysis for this patient.  1/5/2022 Dialysis done today.  No other changes  1/6/2022 Continue with dialysis on tomorrow.  1/7/2022  Dialysis as scheduled today.  And dialysis on tomorrow.  1/10/2022 Dialysis as scheduled.  1/11/2022 No signs of renal recovery.  Continue with dialysis management

## 2022-01-11 NOTE — ASSESSMENT & PLAN NOTE
- 12/14 intubated, tracheostomy 1/4  Currently very ill have not been able to wean  1/11- remains very sick. Not able to tolerate any weaning trials. He is oxygenating adequately on 40%. Prognosis is poor.

## 2022-01-11 NOTE — ASSESSMENT & PLAN NOTE
Continue wound care  1/5/2022.  Continue with wound debridement  1/6/2022 Plans for further wound debridement  1/7/2022 Now s/p wound washout  1/10/2022 Continued would debridement  1/11/2021 Wound care management

## 2022-01-11 NOTE — ASSESSMENT & PLAN NOTE
- continue basal/bolus insulin  -accuchecks are reasonable currently  - he is on tpn and started on tube feeds. Will increase tube feeds. As he reaches goal we will wean TPN

## 2022-01-11 NOTE — SUBJECTIVE & OBJECTIVE
Interval History: The patient remains critically ill. He remains on the ventilator. He is on pressors. Currently afebrile.    Objective:     Vital Signs (Most Recent):  Temp: 99.5 °F (37.5 °C) (01/11/22 1200)  Pulse: 105 (01/11/22 1215)  Resp: (!) 25 (01/11/22 1215)  BP: 127/67 (01/11/22 1215)  SpO2: 100 % (01/11/22 1215) Vital Signs (24h Range):  Temp:  [97 °F (36.1 °C)-99.5 °F (37.5 °C)] 99.5 °F (37.5 °C)  Pulse:  [] 105  Resp:  [14-39] 25  SpO2:  [98 %-100 %] 100 %  BP: ()/() 127/67     Weight: 93.9 kg (207 lb 0.2 oz)  Body mass index is 28.87 kg/m².      Intake/Output Summary (Last 24 hours) at 1/11/2022 1307  Last data filed at 1/11/2022 1100  Gross per 24 hour   Intake 3852.04 ml   Output --   Net 3852.04 ml       Physical Exam  Vitals reviewed.   Constitutional:       General: He is not in acute distress.     Appearance: He is ill-appearing.   HENT:      Head: Normocephalic and atraumatic.      Comments: Right temporal deformity     Right Ear: External ear normal.      Left Ear: External ear normal.      Nose: Nose normal.      Mouth/Throat:      Mouth: Mucous membranes are moist.      Comments: trach  Eyes:      Conjunctiva/sclera: Conjunctivae normal.      Pupils: Pupils are equal, round, and reactive to light.   Cardiovascular:      Rate and Rhythm: Regular rhythm. Tachycardia present.      Pulses: Normal pulses.      Heart sounds: Normal heart sounds.   Pulmonary:      Effort: No respiratory distress.      Comments: coarse breath sounds anteriorly  Abdominal:      Palpations: Abdomen is soft.   Musculoskeletal:         General: Swelling present.      Cervical back: Neck supple.      Right lower leg: Edema present.      Left lower leg: Edema present.   Lymphadenopathy:      Cervical: No cervical adenopathy.   Skin:     General: Skin is warm and dry.   Neurological:      Cranial Nerves: No cranial nerve deficit.      Comments: Remains on mechanical ventilation. Not responding to verbal  commands         Vents:  Vent Mode: A/C (01/11/22 0825)  Set Rate: 16 BPM (01/11/22 0825)  Vt Set: 480 mL (01/11/22 0825)  Pressure Support: 12 cmH20 (01/07/22 2230)  PEEP/CPAP: 5 cmH20 (01/11/22 0825)  Oxygen Concentration (%): 40 (01/11/22 1105)  Peak Airway Pressure: 25 cmH2O (01/11/22 0825)  Total Ve: 6.9 mL (01/11/22 0825)  F/VT Ratio<105 (RSBI): (!) 58.33 (01/09/22 1615)    Lines/Drains/Airways     Peripherally Inserted Central Catheter Line            PICC Double Lumen 01/05/22 left basilic 6 days          Central Venous Catheter Line                 Hemodialysis Catheter 12/30/21 0900 right internal jugular 12 days          Drain                 NG/OG Tube Meagher sump 18 Fr. Left nostril -- days         Colostomy 12/18/21 1030 Descending/sigmoid LUQ 24 days          Airway                 Surgical Airway 01/04/22 Marychuy 7 days          Peripheral Intravenous Line                 Peripheral IV - Single Lumen 01/08/22 1256 20 G Anterior;Left Forearm 3 days                Significant Labs:    CBC/Anemia Profile:  Recent Labs   Lab 01/09/22  1819 01/10/22  0539   WBC 12.68* 18.76*   HGB 7.0* 6.9*   HCT 21.3* 19.4*   PLT 72* 92*   MCV 89.5 85.1   RDW 15.9* 15.5*        Chemistries:  Recent Labs   Lab 01/10/22  0539   *  131*   K 3.8  3.7     98   CO2 19*  20*   BUN 65*  65*   CREATININE 4.68*  5.11*   CALCIUM 7.9*  7.4*   ALBUMIN 0.5*   BILITOT 0.9   ALKPHOS 278*   MG 2.1   PHOS 4.6*       All pertinent labs within the past 24 hours have been reviewed.    Significant Imaging:  I have reviewed all pertinent imaging results/findings within the past 24 hours.

## 2022-01-11 NOTE — ASSESSMENT & PLAN NOTE
- had been on almost a month of antimicrobials, high risk for MDR organisms. Gram negative on resp culture  - repeat blood cultures with NGTD  - changed unasyn to merrem 1/9

## 2022-01-11 NOTE — PROGRESS NOTES
[facility]  Nephrology  Progress Note    Patient Name: Og Garcia  MRN: [id number]  Admission Date: 12/23/2021  Hospital Length of Stay: 19 days  Attending Provider: Cecil Abernathy DO   Primary Care Physician: Hector Arndt DNP, FNP-C  Principal Problem:Denice gangrene    Subjective:     HPI: The patient is known from the previous nephrology consult at [facility].  He is no at Special[facility] and continues to need dialysis support.      Interval History: The patient is resting.  No other changes.    Review of patient's allergies indicates:  No Known Allergies  Current Facility-Administered Medications   Medication Frequency    0.9%  NaCl infusion (for blood administration) Q24H PRN    0.9%  NaCl infusion PRN    acetaminophen tablet 500 mg Q6H PRN    albuterol nebulizer solution 2.5 mg Q4H PRN    amino acid 5% - dextrose 20% solution with custom electrolytes & additives Continuous    amino acid 5% - dextrose 20% solution with custom electrolytes & additives Continuous    amphotericin B liposome (AMBISOME) 450 mg in dextrose 5 % 450 mL IVPB Q24H    dextrose 50% injection 12.5 g PRN    [START ON 1/12/2022] fat emulsion 20% infusion 250 mL Daily    fentaNYL 2500 mcg in D5W 250 mL infusion premix (titrating) (conc: 10 mcg/mL) PRN    glucagon (human recombinant) injection 1 mg PRN    heparin (porcine) injection 4,000 Units PRN    heparin (porcine) injection 5,000 Units Q8H    insulin aspart U-100 injection 1-10 Units Q6H    insulin detemir U-100 injection 20 Units QHS    meropenem (MERREM) 500 mg in sodium chloride 0.9 % 100 mL IVPB (MB+) Q24H    metoprolol injection 5 mg Q5 Min PRN    NORepinephrine 32 mg in dextrose 5 % 250 mL infusion PRN    pantoprazole suspension 40 mg Daily    sevelamer carbonate pwpk 0.8 g TID WM    sodium chloride 0.9% bolus 250 mL PRN    ticagrelor tablet 90 mg BID       Objective:     Vital Signs (Most Recent):  Temp: 99.5 °F (37.5 °C) (01/11/22  1200)  Pulse: 105 (01/11/22 1215)  Resp: (!) 25 (01/11/22 1215)  BP: 127/67 (01/11/22 1215)  SpO2: 100 % (01/11/22 1215)  O2 Device (Oxygen Therapy): ventilator (01/11/22 0200) Vital Signs (24h Range):  Temp:  [97 °F (36.1 °C)-99.5 °F (37.5 °C)] 99.5 °F (37.5 °C)  Pulse:  [] 105  Resp:  [14-39] 25  SpO2:  [98 %-100 %] 100 %  BP: ()/() 127/67     Weight: 93.9 kg (207 lb 0.2 oz) (01/11/22 0500)  Body mass index is 28.87 kg/m².  Body surface area is 2.17 meters squared.    I/O last 3 completed shifts:  In: 5382.3 [I.V.:1; Blood:700; Other:350; NG/GT:550; IV Piggyback:1150]  Out: 200 [Other:200]    Physical Exam  Vitals reviewed.   Constitutional:       Appearance: He is ill-appearing.   HENT:      Mouth/Throat:      Mouth: Mucous membranes are moist.   Cardiovascular:      Rate and Rhythm: Regular rhythm.      Heart sounds: Normal heart sounds.   Pulmonary:      Breath sounds: Normal breath sounds.      Comments: The patient is ventilated.  Abdominal:      General: Bowel sounds are normal.      Palpations: Abdomen is soft.      Comments: NGtube in place         Significant Labs:  BMP:   Recent Labs   Lab 01/10/22  0539   *  318*   *  131*   K 3.8  3.7     98   CO2 19*  20*   BUN 65*  65*   CREATININE 4.68*  5.11*   CALCIUM 7.9*  7.4*   MG 2.1     CBC:   Recent Labs   Lab 01/10/22  0539   WBC 18.76*   RBC 2.28*   HGB 6.9*   HCT 19.4*   PLT 92*   MCV 85.1   MCH 30.3   MCHC 35.6        Significant Imaging:  Labs: Reviewed    Assessment/Plan:     * Denice gangrene  Continue wound care  1/5/2022.  Continue with wound debridement  1/6/2022 Plans for further wound debridement  1/7/2022 Now s/p wound washout  1/10/2022 Continued would debridement  1/11/2021 Wound care management    RAHAT (acute kidney injury)  Dialysis Monday   Continue with scheduled hemodialysis for this patient.  1/5/2022 Dialysis done today.  No other changes  1/6/2022 Continue with dialysis on  tomorrow.  1/7/2022  Dialysis as scheduled today.  And dialysis on tomorrow.  1/10/2022 Dialysis as scheduled.  1/11/2022 No signs of renal recovery.  Continue with dialysis management        Thank you for your consult. I will follow-up with patient. Please contact us if you have any additional questions.    Edwin Pryor Jr, MD  Nephrology  [facility]

## 2022-01-11 NOTE — PROGRESS NOTES
[facility]  Pulmonology  Progress Note    Patient Name: Og Garcia  MRN: [id number]  Admission Date: 12/23/2021  Hospital Length of Stay: 19 days  Code Status: Prior  Attending Provider: Cecil Abernathy DO  Primary Care Provider: Hector Arndt DNP, FNP-C   Principal Problem: Denice gangrene    Subjective:     Interval History: The patient remains critically ill. He remains on the ventilator. He is on pressors. Currently afebrile.    Objective:     Vital Signs (Most Recent):  Temp: 99.5 °F (37.5 °C) (01/11/22 1200)  Pulse: 105 (01/11/22 1215)  Resp: (!) 25 (01/11/22 1215)  BP: 127/67 (01/11/22 1215)  SpO2: 100 % (01/11/22 1215) Vital Signs (24h Range):  Temp:  [97 °F (36.1 °C)-99.5 °F (37.5 °C)] 99.5 °F (37.5 °C)  Pulse:  [] 105  Resp:  [14-39] 25  SpO2:  [98 %-100 %] 100 %  BP: ()/() 127/67     Weight: 93.9 kg (207 lb 0.2 oz)  Body mass index is 28.87 kg/m².      Intake/Output Summary (Last 24 hours) at 1/11/2022 1307  Last data filed at 1/11/2022 1100  Gross per 24 hour   Intake 3852.04 ml   Output --   Net 3852.04 ml       Physical Exam  Vitals reviewed.   Constitutional:       General: He is not in acute distress.     Appearance: He is ill-appearing.   HENT:      Head: Normocephalic and atraumatic.      Comments: Right temporal deformity     Right Ear: External ear normal.      Left Ear: External ear normal.      Nose: Nose normal.      Mouth/Throat:      Mouth: Mucous membranes are moist.      Comments: trach  Eyes:      Conjunctiva/sclera: Conjunctivae normal.      Pupils: Pupils are equal, round, and reactive to light.   Cardiovascular:      Rate and Rhythm: Regular rhythm. Tachycardia present.      Pulses: Normal pulses.      Heart sounds: Normal heart sounds.   Pulmonary:      Effort: No respiratory distress.      Comments: coarse breath sounds anteriorly  Abdominal:      Palpations: Abdomen is soft.   Musculoskeletal:         General: Swelling present.       Cervical back: Neck supple.      Right lower leg: Edema present.      Left lower leg: Edema present.   Lymphadenopathy:      Cervical: No cervical adenopathy.   Skin:     General: Skin is warm and dry.   Neurological:      Cranial Nerves: No cranial nerve deficit.      Comments: Remains on mechanical ventilation. Not responding to verbal commands         Vents:  Vent Mode: A/C (01/11/22 0825)  Set Rate: 16 BPM (01/11/22 0825)  Vt Set: 480 mL (01/11/22 0825)  Pressure Support: 12 cmH20 (01/07/22 2230)  PEEP/CPAP: 5 cmH20 (01/11/22 0825)  Oxygen Concentration (%): 40 (01/11/22 1105)  Peak Airway Pressure: 25 cmH2O (01/11/22 0825)  Total Ve: 6.9 mL (01/11/22 0825)  F/VT Ratio<105 (RSBI): (!) 58.33 (01/09/22 1615)    Lines/Drains/Airways     Peripherally Inserted Central Catheter Line            PICC Double Lumen 01/05/22 left basilic 6 days          Central Venous Catheter Line                 Hemodialysis Catheter 12/30/21 0900 right internal jugular 12 days          Drain                 NG/OG Tube [place] sump 18 Fr. Left nostril -- days         Colostomy 12/18/21 1030 Descending/sigmoid LUQ 24 days          Airway                 Surgical Airway 01/04/22 [place] 7 days          Peripheral Intravenous Line                 Peripheral IV - Single Lumen 01/08/22 1256 20 G Anterior;Left Forearm 3 days                Significant Labs:    CBC/Anemia Profile:  Recent Labs   Lab 01/09/22  1819 01/10/22  0539   WBC 12.68* 18.76*   HGB 7.0* 6.9*   HCT 21.3* 19.4*   PLT 72* 92*   MCV 89.5 85.1   RDW 15.9* 15.5*        Chemistries:  Recent Labs   Lab 01/10/22  0539   *  131*   K 3.8  3.7     98   CO2 19*  20*   BUN 65*  65*   CREATININE 4.68*  5.11*   CALCIUM 7.9*  7.4*   ALBUMIN 0.5*   BILITOT 0.9   ALKPHOS 278*   MG 2.1   PHOS 4.6*       All pertinent labs within the past 24 hours have been reviewed.    Significant Imaging:  I have reviewed all pertinent imaging results/findings within the past 24  hours.    Assessment/Plan:     * Denice gangrene  - s/p multiple debridements  - wound vac in place  - ID consulted for antibiotic management: He was treated with rocephin, daptomycin, clindamycin. 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 Amphotericin started on 1/7. The patient went to OR on 1/10 for debridement. Surgery note reviewed and appreciated.    Ventilator associated pneumonia  - had been on almost a month of antimicrobials, high risk for MDR organisms. Gram negative on resp culture  - repeat blood cultures with NGTD  - changed unasyn to merrem 1/9    Shock  - remains on pressors      Hyperphosphatemia  - initiated sevelamer    Type 2 diabetes mellitus without complication, without long-term current use of insulin  - continue basal/bolus insulin  -accuchecks are reasonable currently  - he is on tpn and started on tube feeds. Will increase tube feeds. As he reaches goal we will wean TPN    Acute blood loss anemia  - H/H down to 5.5/16 - pt had bleeding from his HD insertion site yesterday this has now resolved.   - will transfuse 2 units PRBCs on HD  1/2- 9.7/27.5 - repeat CBC in AM  1/4- Hgb is 7.2. Not sure if he will be transfused during surgery. IF not we will try to get  Him transfused with next HD  01/06/2021 transfuse 1 unit of packed red blood cells today  01/10/2021 needs a unit of blood  1/11- repeat H&H in am. Continue to transfuse as needed.    RAHAT (acute kidney injury)  - nephrology consulted   - no sign of renal recovery.  Continuing with scheduled hemodialysis for this patient.  - tunneled HD line placement 12/30  Continues  with dialysis  1/11 nephrology note reviewed and appreciated. No renal recovery. Continue with HD per nephrology      On mechanically assisted ventilation  - 12/14 intubated, tracheostomy 1/4  Currently very ill have not been able to wean  1/11- remains very sick. Not able to tolerate any weaning trials. He is oxygenating adequately on 40%. Prognosis is poor.          The patient remains critically ill. This note includes approximately 30 minutes of critical care time spent in the management of this patient. This includes review of labs, data, imaging, and vent management.         Cecil Abernathy, DO  Pulmonology  [facility]

## 2022-01-11 NOTE — ANESTHESIA POSTPROCEDURE EVALUATION
Anesthesia Post Evaluation    Patient: Og Wolf [place]    Procedure(s) Performed: Procedure(s) (LRB):  WASHOUT (N/A)    Final Anesthesia Type: general      Patient location during evaluation: ICU  Patient participation: No - Unable to Participate, Intubation  Level of consciousness: awake  Post-procedure vital signs: reviewed and stable  Pain management: adequate  Airway patency: patent  NAMITA mitigation strategies: Multimodal analgesia  PONV status at discharge: No PONV  Anesthetic complications: no      Cardiovascular status: blood pressure returned to baseline  Respiratory status: Tracheostomy  Hydration status: euvolemic  Follow-up not needed.          Vitals Value Taken Time   /67 01/11/22 1215   Temp 37.5 °C (99.5 °F) 01/11/22 1200   Pulse 105 01/11/22 1215   Resp 25 01/11/22 1215   SpO2 100 % 01/11/22 1215         Event Time   Out of Recovery 11:36:00         Pain/Rio Score: Pain Rating Prior to Med Admin: 5 (1/11/2022  8:54 AM)  Pain Rating Post Med Admin: 0 (1/11/2022  9:24 AM)  Rio Score: 6 (1/10/2022  3:34 PM)

## 2022-01-11 NOTE — ASSESSMENT & PLAN NOTE
- nephrology consulted   - no sign of renal recovery.  Continuing with scheduled hemodialysis for this patient.  - tunneled HD line placement 12/30  Continues with dialysis  1/11 nephrology note reviewed and appreciated. No renal recovery. Continue with HD per nephrology

## 2022-01-11 NOTE — NURSING
New tpn infusion with new tubing started as ordered .. lipid infusion with new tubing started as ordered

## 2022-01-11 NOTE — ASSESSMENT & PLAN NOTE
- s/p multiple debridements  - wound vac in place  - ID consulted for antibiotic management: He was treated with rocephin, daptomycin, clindamycin. 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 Amphotericin started on 1/7. The patient went to OR on 1/10 for debridement. Surgery note reviewed and appreciated.

## 2022-01-11 NOTE — SUBJECTIVE & OBJECTIVE
Interval History: The patient is resting.  No other changes.    Review of patient's allergies indicates:  No Known Allergies  Current Facility-Administered Medications   Medication Frequency    0.9%  NaCl infusion (for blood administration) Q24H PRN    0.9%  NaCl infusion PRN    acetaminophen tablet 500 mg Q6H PRN    albuterol nebulizer solution 2.5 mg Q4H PRN    amino acid 5% - dextrose 20% solution with custom electrolytes & additives Continuous    amino acid 5% - dextrose 20% solution with custom electrolytes & additives Continuous    amphotericin B liposome (AMBISOME) 450 mg in dextrose 5 % 450 mL IVPB Q24H    dextrose 50% injection 12.5 g PRN    [START ON 1/12/2022] fat emulsion 20% infusion 250 mL Daily    fentaNYL 2500 mcg in D5W 250 mL infusion premix (titrating) (conc: 10 mcg/mL) PRN    glucagon (human recombinant) injection 1 mg PRN    heparin (porcine) injection 4,000 Units PRN    heparin (porcine) injection 5,000 Units Q8H    insulin aspart U-100 injection 1-10 Units Q6H    insulin detemir U-100 injection 20 Units QHS    meropenem (MERREM) 500 mg in sodium chloride 0.9 % 100 mL IVPB (MB+) Q24H    metoprolol injection 5 mg Q5 Min PRN    NORepinephrine 32 mg in dextrose 5 % 250 mL infusion PRN    pantoprazole suspension 40 mg Daily    sevelamer carbonate pwpk 0.8 g TID WM    sodium chloride 0.9% bolus 250 mL PRN    ticagrelor tablet 90 mg BID       Objective:     Vital Signs (Most Recent):  Temp: 99.5 °F (37.5 °C) (01/11/22 1200)  Pulse: 105 (01/11/22 1215)  Resp: (!) 25 (01/11/22 1215)  BP: 127/67 (01/11/22 1215)  SpO2: 100 % (01/11/22 1215)  O2 Device (Oxygen Therapy): ventilator (01/11/22 0200) Vital Signs (24h Range):  Temp:  [97 °F (36.1 °C)-99.5 °F (37.5 °C)] 99.5 °F (37.5 °C)  Pulse:  [] 105  Resp:  [14-39] 25  SpO2:  [98 %-100 %] 100 %  BP: ()/() 127/67     Weight: 93.9 kg (207 lb 0.2 oz) (01/11/22 0500)  Body mass index is 28.87 kg/m².  Body surface area is  2.17 meters squared.    I/O last 3 completed shifts:  In: 5382.3 [I.V.:1; Blood:700; Other:350; NG/GT:550; IV Piggyback:1150]  Out: 200 [Other:200]    Physical Exam  Vitals reviewed.   Constitutional:       Appearance: He is ill-appearing.   HENT:      Mouth/Throat:      Mouth: Mucous membranes are moist.   Cardiovascular:      Rate and Rhythm: Regular rhythm.      Heart sounds: Normal heart sounds.   Pulmonary:      Breath sounds: Normal breath sounds.      Comments: The patient is ventilated.  Abdominal:      General: Bowel sounds are normal.      Palpations: Abdomen is soft.      Comments: NGtube in place         Significant Labs:  BMP:   Recent Labs   Lab 01/10/22  0539   *  318*   *  131*   K 3.8  3.7     98   CO2 19*  20*   BUN 65*  65*   CREATININE 4.68*  5.11*   CALCIUM 7.9*  7.4*   MG 2.1     CBC:   Recent Labs   Lab 01/10/22  0539   WBC 18.76*   RBC 2.28*   HGB 6.9*   HCT 19.4*   PLT 92*   MCV 85.1   MCH 30.3   MCHC 35.6        Significant Imaging:  Labs: Reviewed

## 2022-01-12 NOTE — TRANSFER OF CARE
Anesthesia Transfer of Care Note    Patient: [facility]    Procedure(s) Performed: Procedure(s) (LRB):  WASHOUT (N/A)    Patient location: Other: PACU in HOW    Anesthesia Type: general    Transport from OR: Continuous ECG monitoring in transport. Continuous SpO2 monitoring in transport. Upon arrival to PACU/ICU, patient attached to ventilator and auscultated to confirm bilateral breath sounds and adequate TV. Transported from OR intubated on 100% O2 by AMBU with assisted ventilation    Post pain: adequate analgesia    Post assessment: no apparent anesthetic complications    Post vital signs: stable    Level of consciousness: responds to stimulation    Nausea/Vomiting: no nausea/vomiting    Complications: none    Transfer of care protocol was followed      Last vitals:   Visit Vitals  /60 (BP Location: Right arm, Patient Position: Lying)   Pulse 88   Temp 36.9 °C (98.4 °F) (Oral)   Resp (!) 22   SpO2 100%

## 2022-01-12 NOTE — PROGRESS NOTES
[facility]  Nephrology  Progress Note    Patient Name: Og Garcia  MRN: [id number]  Admission Date: 12/23/2021  Hospital Length of Stay: 20 days  Attending Provider: Cecil Abernathy DO   Primary Care Physician: Hector Arndt DNP, FNP-C  Principal Problem:Denice gangrene    Subjective:     HPI: The patient is known from the previous nephrology consult at [facility].  He is no at Specialty and continues to need dialysis support.      Interval History: The patient is seen on dialysis.  He is tolerating the procedure.    Review of patient's allergies indicates:  No Known Allergies  Current Facility-Administered Medications   Medication Frequency    0.9%  NaCl infusion (for blood administration) Q24H PRN    0.9%  NaCl infusion PRN    acetaminophen tablet 500 mg Q6H PRN    albuterol nebulizer solution 2.5 mg Q4H PRN    amino acid 5% - dextrose 20% solution with custom electrolytes & additives Continuous    amino acid 5% - dextrose 20% solution with custom electrolytes & additives Continuous    amphotericin B liposome (AMBISOME) 450 mg in dextrose 5 % 450 mL IVPB Q24H    dextrose 50% injection 12.5 g PRN    fat emulsion 20% infusion 250 mL Daily    [START ON 1/13/2022] fat emulsion 20% infusion 250 mL Daily    fentaNYL 2500 mcg in D5W 250 mL infusion premix (titrating) (conc: 10 mcg/mL) PRN    glucagon (human recombinant) injection 1 mg PRN    heparin (porcine) injection 4,000 Units PRN    heparin (porcine) injection 5,000 Units Q8H    insulin aspart U-100 injection 1-10 Units Q6H    insulin detemir U-100 injection 20 Units QHS    meropenem (MERREM) 500 mg in sodium chloride 0.9 % 100 mL IVPB (MB+) Q24H    metoprolol injection 5 mg Q5 Min PRN    NORepinephrine 32 mg in dextrose 5 % 250 mL infusion PRN    pantoprazole suspension 40 mg Daily    sevelamer carbonate pwpk 0.8 g TID WM    sodium chloride 0.9% bolus 250 mL PRN    ticagrelor tablet 90 mg BID       Objective:      Vital Signs (Most Recent):  Temp: 97.9 °F (36.6 °C) (01/12/22 1425)  Pulse: 91 (01/12/22 0715)  Resp: (!) 32 (01/12/22 0715)  BP: (!) 145/67 (01/12/22 0715)  SpO2: 98 % (01/12/22 0715)  O2 Device (Oxygen Therapy): ventilator (01/12/22 1250) Vital Signs (24h Range):  Temp:  [97.9 °F (36.6 °C)-98.6 °F (37 °C)] 97.9 °F (36.6 °C)  Pulse:  [70-98] 89  Resp:  [13-39] 32  SpO2:  [96 %-100 %] 100 %  BP: ()/(44-79) 143/69     Weight: 92.2 kg (203 lb 4.2 oz) (01/12/22 0615)  Body mass index is 28.35 kg/m².  Body surface area is 2.15 meters squared.    I/O last 3 completed shifts:  In: 4911.2 [I.V.:85.9; NG/GT:1230; IV Piggyback:1100]  Out: 825 [Other:250; Stool:575]    Physical Exam  Vitals reviewed.   Constitutional:       Appearance: He is ill-appearing.   Pulmonary:      Comments: Ventilated  Abdominal:      General: Bowel sounds are normal.      Palpations: Abdomen is soft.         Significant Labs:  BMP:   Recent Labs   Lab 01/10/22  0539   *  318*   *  131*   K 3.8  3.7     98   CO2 19*  20*   BUN 65*  65*   CREATININE 4.68*  5.11*   CALCIUM 7.9*  7.4*   MG 2.1     CBC:   Recent Labs   Lab 01/10/22  0539   WBC 18.76*   RBC 2.28*   HGB 6.9*   HCT 19.4*   PLT 92*   MCV 85.1   MCH 30.3   MCHC 35.6        Significant Imaging:  Labs: Reviewed    Assessment/Plan:     RAHAT (acute kidney injury)  Dialysis Monday   Continue with scheduled hemodialysis for this patient.  1/5/2022 Dialysis done today.  No other changes  1/6/2022 Continue with dialysis on tomorrow.  1/7/2022  Dialysis as scheduled today.  And dialysis on tomorrow.  1/10/2022 Dialysis as scheduled.  1/11/2022 No signs of renal recovery.  Continue with dialysis management  1/12/2022 Continue with scheduled hemodialysis for this patient.        Thank you for your consult. I will follow-up with patient. Please contact us if you have any additional questions.    Edwin Pryor Jr, MD  Nephrology  [facility]

## 2022-01-12 NOTE — PROGRESS NOTES
[facility]  Nephrology  Progress Note    Patient Name: Og Garcia  MRN: [id number]  Admission Date: 12/23/2021  Hospital Length of Stay: 20 days  Attending Provider: Cecil Abernathy DO   Primary Care Physician: Hector Arndt DNP, FNP-C  Principal Problem:Denice gangrene    Consults  Subjective:     Interval History: Seen during hemodialysis.    Review of patient's allergies indicates:  No Known Allergies  Current Facility-Administered Medications   Medication Frequency    0.9%  NaCl infusion (for blood administration) Q24H PRN    0.9%  NaCl infusion PRN    acetaminophen tablet 500 mg Q6H PRN    albuterol nebulizer solution 2.5 mg Q4H PRN    amino acid 5% - dextrose 20% solution with custom electrolytes & additives Continuous    amino acid 5% - dextrose 20% solution with custom electrolytes & additives Continuous    amphotericin B liposome (AMBISOME) 450 mg in dextrose 5 % 450 mL IVPB Q24H    dextrose 50% injection 12.5 g PRN    fat emulsion 20% infusion 250 mL Daily    [START ON 1/13/2022] fat emulsion 20% infusion 250 mL Daily    fentaNYL 2500 mcg in D5W 250 mL infusion premix (titrating) (conc: 10 mcg/mL) PRN    glucagon (human recombinant) injection 1 mg PRN    heparin (porcine) injection 4,000 Units PRN    heparin (porcine) injection 5,000 Units Q8H    insulin aspart U-100 injection 1-10 Units Q6H    insulin detemir U-100 injection 20 Units QHS    meropenem (MERREM) 500 mg in sodium chloride 0.9 % 100 mL IVPB (MB+) Q24H    metoprolol injection 5 mg Q5 Min PRN    NORepinephrine 32 mg in dextrose 5 % 250 mL infusion PRN    pantoprazole suspension 40 mg Daily    sevelamer carbonate pwpk 0.8 g TID WM    sodium chloride 0.9% bolus 250 mL PRN    ticagrelor tablet 90 mg BID       Objective:     Vital Signs (Most Recent):  Temp: 98.6 °F (37 °C) (01/12/22 0400)  Pulse: 91 (01/12/22 0715)  Resp: (!) 32 (01/12/22 0715)  BP: (!) 145/67 (01/12/22 0715)  SpO2: 98 %  (01/12/22 0715)  O2 Device (Oxygen Therapy): ventilator (01/12/22 1250) Vital Signs (24h Range):  Temp:  [98.4 °F (36.9 °C)-98.6 °F (37 °C)] 98.4 °F (36.9 °C)  Pulse:  [] 89  Resp:  [13-39] 32  SpO2:  [96 %-100 %] 100 %  BP: ()/(44-79) 143/69     Weight: 92.2 kg (203 lb 4.2 oz) (01/12/22 0615)  Body mass index is 28.35 kg/m².  Body surface area is 2.15 meters squared.    I/O last 3 completed shifts:  In: 4911.2 [I.V.:85.9; NG/GT:1230; IV Piggyback:1100]  Out: 825 [Other:250; Stool:575]    Physical Exam Unresponsive, on vent via trach. BP stable    Significant Labs:sure  BMP:   Recent Labs   Lab 01/10/22  0539   *  318*   *  131*   K 3.8  3.7     98   CO2 19*  20*   BUN 65*  65*   CREATININE 4.68*  5.11*   CALCIUM 7.9*  7.4*   MG 2.1     All labs within the past 24 hours have been reviewed.    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] 12/13/2021 Yes    Ventilator associated pneumonia [J95.851] 01/09/2022 No    Shock [R57.9] 01/08/2022 Yes    Hyperphosphatemia [E83.39] 01/07/2022 No    Acute blood loss anemia [D62] 12/25/2021 No    Type 2 diabetes mellitus without complication, without long-term current use of insulin [E11.9] 12/25/2021 Yes    Necrotizing fasciitis [M72.6]  Yes    Hypocalcemia [E83.51] 12/16/2021 Yes    RAHAT (acute kidney injury) [N17.9] 12/14/2021 Yes    On mechanically assisted ventilation [Z99.11] 12/14/2021 Not Applicable    Hypertension [I10] 09/02/2021 Yes      Problems Resolved During this Admission:       Continue scheduled dialysis    Thank you for your consult. I will follow-up with patient. Please contact us if you have any additional questions.    Theo Coe MD  Nephrology  [facility]

## 2022-01-12 NOTE — ASSESSMENT & PLAN NOTE
- nephrology consulted   - no sign of renal recovery.  Continuing with scheduled hemodialysis for this patient.  - tunneled HD line placement 12/30  Continues with dialysis  1/12 nephrology note reviewed and appreciated. No renal recovery. Continue with HD per nephrology

## 2022-01-12 NOTE — ANESTHESIA PREPROCEDURE EVALUATION
"                                                                                                             01/12/2022  Og Garcia is a 66 y.o., male.    Anesthesia Evaluation    I have reviewed the Patient Summary Reports.   I have reviewed the NPO Status.   I have reviewed the Medications.     Review of Systems         Anesthesia Plan  Type of Anesthesia, risks & benefits discussed:  Anesthesia Type:  general    Patient's Preference:   Plan Factors:          Intra-op Monitoring Plan: standard ASA monitors  Intra-op Monitoring Plan Comments:   Post Op Pain Control Plan: IV/PO Opioids PRN  Post Op Pain Control Plan Comments:     Induction:   IV  Beta Blocker:         Informed Consent: Patient representative understands risks and agrees with Anesthesia plan.  Questions answered. Anesthesia consent signed with patient representative.  ASA Score: 4     Day of Surgery Review of History & Physical:            Ready For Surgery From Anesthesia Perspective.     NPO greater than 8 hours  NAC  NKDA     Hct 19  Platelets 92  K 3.8  Cr 4.7  Ca 7.9  12/17/21 EKG: Sinus rhythm 94 bpm;   RBBB with left anterior fascicular block   Inferior infarct - age undetermined  1/10/22 Echo: trace MR; EF 50%; pulmonary HTN  1/5/22 CXR: "Mild improvement of pulmonary density."     HTN  Diabetes mellitus  H/o CVA  Respiratory failure . . . Trached/ventilated  Denice's gangrene . . . S/p multiple debridements  RAHAT . . . Pt being dialyzed in the hospital  Critical anemia   Hypocalcemia  Thrombocytopenia     Airway exam deferred (trached)    "

## 2022-01-12 NOTE — PLAN OF CARE
Problem: Adult Inpatient Plan of Care  Goal: Absence of Hospital-Acquired Illness or Injury  Outcome: Ongoing, Progressing  Goal: Optimal Comfort and Wellbeing  Outcome: Ongoing, Progressing     Problem: Adjustment to Illness (Sepsis/Septic Shock)  Goal: Optimal Coping  Outcome: Ongoing, Progressing     Problem: Bleeding (Sepsis/Septic Shock)  Goal: Absence of Bleeding  Outcome: Ongoing, Progressing     Problem: Glycemic Control Impaired (Sepsis/Septic Shock)  Goal: Blood Glucose Level Within Desired Range  Outcome: Ongoing, Progressing     Problem: Fall Injury Risk  Goal: Absence of Fall and Fall-Related Injury  Outcome: Ongoing, Progressing     Problem: Adult Inpatient Plan of Care  Goal: Absence of Hospital-Acquired Illness or Injury  Outcome: Ongoing, Progressing  Goal: Optimal Comfort and Wellbeing  Outcome: Ongoing, Progressing     Problem: Adjustment to Illness (Sepsis/Septic Shock)  Goal: Optimal Coping  Outcome: Ongoing, Progressing     Problem: Bleeding (Sepsis/Septic Shock)  Goal: Absence of Bleeding  Outcome: Ongoing, Progressing     Problem: Glycemic Control Impaired (Sepsis/Septic Shock)  Goal: Blood Glucose Level Within Desired Range  Outcome: Ongoing, Progressing     Problem: Fall Injury Risk  Goal: Absence of Fall and Fall-Related Injury  Outcome: Ongoing, Progressing     Problem: Adult Inpatient Plan of Care  Goal: Absence of Hospital-Acquired Illness or Injury  Outcome: Ongoing, Progressing  Goal: Optimal Comfort and Wellbeing  Outcome: Ongoing, Progressing     Problem: Adjustment to Illness (Sepsis/Septic Shock)  Goal: Optimal Coping  Outcome: Ongoing, Progressing     Problem: Bleeding (Sepsis/Septic Shock)  Goal: Absence of Bleeding  Outcome: Ongoing, Progressing     Problem: Glycemic Control Impaired (Sepsis/Septic Shock)  Goal: Blood Glucose Level Within Desired Range  Outcome: Ongoing, Progressing     Problem: Fall Injury Risk  Goal: Absence of Fall and Fall-Related Injury  Outcome:  Ongoing, Progressing     Problem: Adult Inpatient Plan of Care  Goal: Absence of Hospital-Acquired Illness or Injury  Outcome: Ongoing, Progressing  Goal: Optimal Comfort and Wellbeing  Outcome: Ongoing, Progressing     Problem: Adjustment to Illness (Sepsis/Septic Shock)  Goal: Optimal Coping  Outcome: Ongoing, Progressing     Problem: Bleeding (Sepsis/Septic Shock)  Goal: Absence of Bleeding  Outcome: Ongoing, Progressing     Problem: Glycemic Control Impaired (Sepsis/Septic Shock)  Goal: Blood Glucose Level Within Desired Range  Outcome: Ongoing, Progressing     Problem: Fall Injury Risk  Goal: Absence of Fall and Fall-Related Injury  Outcome: Ongoing, Progressing     Problem: Adult Inpatient Plan of Care  Goal: Absence of Hospital-Acquired Illness or Injury  Outcome: Ongoing, Progressing  Goal: Optimal Comfort and Wellbeing  Outcome: Ongoing, Progressing     Problem: Adjustment to Illness (Sepsis/Septic Shock)  Goal: Optimal Coping  Outcome: Ongoing, Progressing     Problem: Bleeding (Sepsis/Septic Shock)  Goal: Absence of Bleeding  Outcome: Ongoing, Progressing     Problem: Glycemic Control Impaired (Sepsis/Septic Shock)  Goal: Blood Glucose Level Within Desired Range  Outcome: Ongoing, Progressing     Problem: Fall Injury Risk  Goal: Absence of Fall and Fall-Related Injury  Outcome: Ongoing, Progressing     Problem: Adult Inpatient Plan of Care  Goal: Absence of Hospital-Acquired Illness or Injury  Outcome: Ongoing, Progressing  Goal: Optimal Comfort and Wellbeing  Outcome: Ongoing, Progressing     Problem: Adjustment to Illness (Sepsis/Septic Shock)  Goal: Optimal Coping  Outcome: Ongoing, Progressing     Problem: Bleeding (Sepsis/Septic Shock)  Goal: Absence of Bleeding  Outcome: Ongoing, Progressing     Problem: Glycemic Control Impaired (Sepsis/Septic Shock)  Goal: Blood Glucose Level Within Desired Range  Outcome: Ongoing, Progressing     Problem: Fall Injury Risk  Goal: Absence of Fall and Fall-Related  Injury  Outcome: Ongoing, Progressing     Problem: Adult Inpatient Plan of Care  Goal: Absence of Hospital-Acquired Illness or Injury  Outcome: Ongoing, Progressing  Goal: Optimal Comfort and Wellbeing  Outcome: Ongoing, Progressing     Problem: Adjustment to Illness (Sepsis/Septic Shock)  Goal: Optimal Coping  Outcome: Ongoing, Progressing     Problem: Bleeding (Sepsis/Septic Shock)  Goal: Absence of Bleeding  Outcome: Ongoing, Progressing     Problem: Glycemic Control Impaired (Sepsis/Septic Shock)  Goal: Blood Glucose Level Within Desired Range  Outcome: Ongoing, Progressing     Problem: Fall Injury Risk  Goal: Absence of Fall and Fall-Related Injury  Outcome: Ongoing, Progressing

## 2022-01-12 NOTE — ANESTHESIA POSTPROCEDURE EVALUATION
Anesthesia Post Evaluation    Patient: Og [facility]    Procedure(s) Performed: Procedure(s) (LRB):  WASHOUT (N/A)    Final Anesthesia Type: general      Patient location during evaluation: floor  Patient participation: No - Unable to Participate, Intubation  Level of consciousness: sedated  Post-procedure vital signs: reviewed and stable  Pain management: adequate  Airway patency: patent    PONV status at discharge: No PONV  Anesthetic complications: no      Cardiovascular status: hemodynamically stable  Respiratory status: unassisted  Hydration status: euvolemic  Follow-up not needed.          Vitals Value Taken Time   /69 01/12/22 1045   Temp 36.9 °C (98.4 °F) 01/12/22 1025   Pulse 89 01/12/22 1045   Resp 32 01/12/22 1045   SpO2 100 % 01/12/22 1045         Event Time   Out of Recovery 10:45:00         Pain/Rio Score: Pain Rating Prior to Med Admin: 5 (1/11/2022  8:54 AM)  Pain Rating Post Med Admin: 0 (1/11/2022  9:24 AM)  Rio Score: 4 (1/12/2022 10:43 AM)

## 2022-01-12 NOTE — ASSESSMENT & PLAN NOTE
Dialysis Monday   Continue with scheduled hemodialysis for this patient.  1/5/2022 Dialysis done today.  No other changes  1/6/2022 Continue with dialysis on tomorrow.  1/7/2022  Dialysis as scheduled today.  And dialysis on tomorrow.  1/10/2022 Dialysis as scheduled.  1/11/2022 No signs of renal recovery.  Continue with dialysis management  1/12/2022 Continue with scheduled hemodialysis for this patient.

## 2022-01-12 NOTE — H&P
Quick washout/debridement planned for patien due to retained Kerlex and packing in abdomen.  OR risks and benefits explained to patient family.  All questions answered

## 2022-01-12 NOTE — PROGRESS NOTES
[facility]  Pulmonology  Progress Note    Patient Name: Og Garcia  MRN: [id number]  Admission Date: 12/23/2021  Hospital Length of Stay: 20 days  Code Status: Prior  Attending Provider: Cecil Abernathy DO  Primary Care Provider: Hector Arndt DNP, FNP-C   Principal Problem: Denice gangrene    Subjective:     Interval History: The patient remains critically ill. He remains on the ventilator. He remains on pressors. Currently afebrile.    Objective:     Vital Signs (Most Recent):  Temp: 97.9 °F (36.6 °C) (01/12/22 1425)  Pulse: 91 (01/12/22 0715)  Resp: (!) 32 (01/12/22 0715)  BP: (!) 145/67 (01/12/22 0715)  SpO2: 98 % (01/12/22 0715) Vital Signs (24h Range):  Temp:  [97.9 °F (36.6 °C)-98.6 °F (37 °C)] 97.9 °F (36.6 °C)  Pulse:  [70-96] 89  Resp:  [13-39] 32  SpO2:  [96 %-100 %] 100 %  BP: ()/(46-79) 143/69     Weight: 92.2 kg (203 lb 4.2 oz)  Body mass index is 28.35 kg/m².      Intake/Output Summary (Last 24 hours) at 1/12/2022 1652  Last data filed at 1/12/2022 0600  Gross per 24 hour   Intake 1507.2 ml   Output 825 ml   Net 682.2 ml       Physical Exam  Vitals reviewed.   Constitutional:       General: He is not in acute distress.     Appearance: He is ill-appearing.   HENT:      Head: Normocephalic and atraumatic.      Comments: Right temporal deformity     Right Ear: External ear normal.      Left Ear: External ear normal.      Nose: Nose normal.      Mouth/Throat:      Mouth: Mucous membranes are moist.      Comments: trach  Eyes:      Conjunctiva/sclera: Conjunctivae normal.      Pupils: Pupils are equal, round, and reactive to light.   Cardiovascular:      Rate and Rhythm: Regular rhythm. Tachycardia present.      Pulses: Normal pulses.      Heart sounds: Normal heart sounds.   Pulmonary:      Effort: No respiratory distress.      Comments: coarse breath sounds anteriorly  Abdominal:      Palpations: Abdomen is soft.   Musculoskeletal:         General: Swelling  present.      Cervical back: Neck supple.      Right lower leg: Edema present.      Left lower leg: Edema present.   Lymphadenopathy:      Cervical: No cervical adenopathy.   Skin:     General: Skin is warm and dry.   Neurological:      Cranial Nerves: No cranial nerve deficit.      Comments: Remains on mechanical ventilation. Not responding to verbal commands         Vents:  Vent Mode: A/C (01/12/22 1250)  Set Rate: 16 BPM (01/12/22 1250)  Vt Set: 480 mL (01/12/22 1250)  Pressure Support: 12 cmH20 (01/07/22 2230)  PEEP/CPAP: 5 cmH20 (01/12/22 1250)  Oxygen Concentration (%): 60 (01/12/22 1250)  Peak Airway Pressure: 27 cmH2O (01/12/22 1250)  Total Ve: 15.5 mL (01/12/22 1250)  F/VT Ratio<105 (RSBI): (!) 58.33 (01/09/22 1615)    Lines/Drains/Airways     Peripherally Inserted Central Catheter Line            PICC Double Lumen 01/05/22 left basilic 7 days          Central Venous Catheter Line                 Hemodialysis Catheter 12/30/21 0900 right internal jugular 13 days          Drain                 NG/OG Tube [place] sump 18 Fr. Left nostril -- days         Colostomy 12/18/21 1030 Descending/sigmoid LUQ 25 days          Airway                 Surgical Airway 01/04/22 [place] 8 days          Peripheral Intravenous Line                 Peripheral IV - Single Lumen 01/08/22 1256 20 G Anterior;Left Forearm 4 days                Significant Labs:    CBC/Anemia Profile:  No results for input(s): WBC, HGB, HCT, PLT, MCV, RDW, IRON, FERRITIN, RETIC, FOLATE, [id number], OCCULTBLOOD in the last 48 hours.     Chemistries:  Recent Labs   Lab 01/12/22  1441      K 3.3*      CO2 23   BUN 74*   CREATININE 4.12*   CALCIUM 5.7*   ALBUMIN 0.6*   BILITOT 1.1   ALKPHOS 310*   MG 1.9   PHOS 4.0       All pertinent labs within the past 24 hours have been reviewed.    Significant Imaging:  I have reviewed all pertinent imaging results/findings within the past 24 hours.    Assessment/Plan:     * Denice gangrene  - s/p  multiple debridements  - wound vac in place  - ID consulted for antibiotic management: He was treated with rocephin, daptomycin, clindamycin. 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 Amphotericin started on 1/7. The patient went to OR on 1/10 for debridement. Surgery note reviewed and appreciated.  1/12- back to OR today.    Ventilator associated pneumonia  - had been on almost a month of antimicrobials, high risk for MDR organisms. Gram negative on resp culture  - repeat blood cultures with NGTD  - changed unasyn to merrem 1/9    Shock  - remains on pressors but bp is improving      Hyperphosphatemia  - initiated sevelamer    Type 2 diabetes mellitus without complication, without long-term current use of insulin  - continue basal/bolus insulin  -accuchecks are reasonable currently  - he is on tpn and started on tube feeds. Will increase tube feeds. As he reaches goal we will wean TPN    Acute blood loss anemia  - H/H down to 5.5/16 - pt had bleeding from his HD insertion site yesterday this has now resolved.   - will transfuse 2 units PRBCs on HD  1/2- 9.7/27.5 - repeat CBC in AM  1/4- Hgb is 7.2. Not sure if he will be transfused during surgery. IF not we will try to get  Him transfused with next HD  01/06/2021 transfuse 1 unit of packed red blood cells today  01/10/2021 needs a unit of blood  1/11- repeat H&H in am. Continue to transfuse as needed.    Hypocalcemia  He is being dialyzed. Will continue to monitor. If needed can supplement    RAHAT (acute kidney injury)  - nephrology consulted   - no sign of  renal recovery.  Continuing with scheduled hemodialysis for this patient.  - tunneled HD line placement 12/30  Continues with dialysis  1/12 nephrology note reviewed and appreciated. No renal recovery. Continue with HD per nephrology      On mechanically assisted ventilation  - 12/14 intubated, tracheostomy 1/4  Currently very ill have not been able to wean  1/11- remains very sick. Not able to tolerate any weaning trials. He is oxygenating adequately on 40%. Prognosis is poor.  1/12- no significant change. He is critically ill and not able to tolerate weaning trials currently        The patient remains critically ill. This note includes approximately 30 minutes of critical care time spent in the management of this patient. This includes review of labs, data, imaging, and vent management.           Cecil Abernathy, DO  Pulmonology  [facility]

## 2022-01-12 NOTE — SUBJECTIVE & OBJECTIVE
Interval History: The patient remains critically ill. He remains on the ventilator. He remains on pressors. Currently afebrile.    Objective:     Vital Signs (Most Recent):  Temp: 97.9 °F (36.6 °C) (01/12/22 1425)  Pulse: 91 (01/12/22 0715)  Resp: (!) 32 (01/12/22 0715)  BP: (!) 145/67 (01/12/22 0715)  SpO2: 98 % (01/12/22 0715) Vital Signs (24h Range):  Temp:  [97.9 °F (36.6 °C)-98.6 °F (37 °C)] 97.9 °F (36.6 °C)  Pulse:  [70-96] 89  Resp:  [13-39] 32  SpO2:  [96 %-100 %] 100 %  BP: ()/(46-79) 143/69     Weight: 92.2 kg (203 lb 4.2 oz)  Body mass index is 28.35 kg/m².      Intake/Output Summary (Last 24 hours) at 1/12/2022 1652  Last data filed at 1/12/2022 0600  Gross per 24 hour   Intake 1507.2 ml   Output 825 ml   Net 682.2 ml       Physical Exam  Vitals reviewed.   Constitutional:       General: He is not in acute distress.     Appearance: He is ill-appearing.   HENT:      Head: Normocephalic and atraumatic.      Comments: Right temporal deformity     Right Ear: External ear normal.      Left Ear: External ear normal.      Nose: Nose normal.      Mouth/Throat:      Mouth: Mucous membranes are moist.      Comments: trach  Eyes:      Conjunctiva/sclera: Conjunctivae normal.      Pupils: Pupils are equal, round, and reactive to light.   Cardiovascular:      Rate and Rhythm: Regular rhythm. Tachycardia present.      Pulses: Normal pulses.      Heart sounds: Normal heart sounds.   Pulmonary:      Effort: No respiratory distress.      Comments: coarse breath sounds anteriorly  Abdominal:      Palpations: Abdomen is soft.   Musculoskeletal:         General: Swelling present.      Cervical back: Neck supple.      Right lower leg: Edema present.      Left lower leg: Edema present.   Lymphadenopathy:      Cervical: No cervical adenopathy.   Skin:     General: Skin is warm and dry.   Neurological:      Cranial Nerves: No cranial nerve deficit.      Comments: Remains on mechanical ventilation. Not responding to  verbal commands         Vents:  Vent Mode: A/C (01/12/22 1250)  Set Rate: 16 BPM (01/12/22 1250)  Vt Set: 480 mL (01/12/22 1250)  Pressure Support: 12 cmH20 (01/07/22 2230)  PEEP/CPAP: 5 cmH20 (01/12/22 1250)  Oxygen Concentration (%): 60 (01/12/22 1250)  Peak Airway Pressure: 27 cmH2O (01/12/22 1250)  Total Ve: 15.5 mL (01/12/22 1250)  F/VT Ratio<105 (RSBI): (!) 58.33 (01/09/22 1615)    Lines/Drains/Airways     Peripherally Inserted Central Catheter Line            PICC Double Lumen 01/05/22 left basilic 7 days          Central Venous Catheter Line                 Hemodialysis Catheter 12/30/21 0900 right internal jugular 13 days          Drain                 NG/OG Tube [place] sump 18 Fr. Left nostril -- days         Colostomy 12/18/21 1030 Descending/sigmoid LUQ 25 days          Airway                 Surgical Airway 01/04/22 [place] 8 days          Peripheral Intravenous Line                 Peripheral IV - Single Lumen 01/08/22 1256 20 G Anterior;Left Forearm 4 days                Significant Labs:    CBC/Anemia Profile:  No results for input(s): WBC, HGB, HCT, PLT, MCV, RDW, IRON, FERRITIN, RETIC, FOLATE, [id number], OCCULTBLOOD in the last 48 hours.     Chemistries:  Recent Labs   Lab 01/12/22  1441      K 3.3*      CO2 23   BUN 74*   CREATININE 4.12*   CALCIUM 5.7*   ALBUMIN 0.6*   BILITOT 1.1   ALKPHOS 310*   MG 1.9   PHOS 4.0       All pertinent labs within the past 24 hours have been reviewed.    Significant Imaging:  I have reviewed all pertinent imaging results/findings within the past 24 hours.

## 2022-01-12 NOTE — ASSESSMENT & PLAN NOTE
- s/p multiple debridements  - wound vac in place  - ID consulted for antibiotic management: He was treated with rocephin, daptomycin, clindamycin. 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 Amphotericin started on 1/7. The patient went to OR on 1/10 for debridement. Surgery note reviewed and appreciated.  1/12- back to OR today.

## 2022-01-12 NOTE — SUBJECTIVE & OBJECTIVE
Interval History: The patient is seen on dialysis.  He is tolerating the procedure.    Review of patient's allergies indicates:  No Known Allergies  Current Facility-Administered Medications   Medication Frequency    0.9%  NaCl infusion (for blood administration) Q24H PRN    0.9%  NaCl infusion PRN    acetaminophen tablet 500 mg Q6H PRN    albuterol nebulizer solution 2.5 mg Q4H PRN    amino acid 5% - dextrose 20% solution with custom electrolytes & additives Continuous    amino acid 5% - dextrose 20% solution with custom electrolytes & additives Continuous    amphotericin B liposome (AMBISOME) 450 mg in dextrose 5 % 450 mL IVPB Q24H    dextrose 50% injection 12.5 g PRN    fat emulsion 20% infusion 250 mL Daily    [START ON 1/13/2022] fat emulsion 20% infusion 250 mL Daily    fentaNYL 2500 mcg in D5W 250 mL infusion premix (titrating) (conc: 10 mcg/mL) PRN    glucagon (human recombinant) injection 1 mg PRN    heparin (porcine) injection 4,000 Units PRN    heparin (porcine) injection 5,000 Units Q8H    insulin aspart U-100 injection 1-10 Units Q6H    insulin detemir U-100 injection 20 Units QHS    meropenem (MERREM) 500 mg in sodium chloride 0.9 % 100 mL IVPB (MB+) Q24H    metoprolol injection 5 mg Q5 Min PRN    NORepinephrine 32 mg in dextrose 5 % 250 mL infusion PRN    pantoprazole suspension 40 mg Daily    sevelamer carbonate pwpk 0.8 g TID WM    sodium chloride 0.9% bolus 250 mL PRN    ticagrelor tablet 90 mg BID       Objective:     Vital Signs (Most Recent):  Temp: 97.9 °F (36.6 °C) (01/12/22 1425)  Pulse: 91 (01/12/22 0715)  Resp: (!) 32 (01/12/22 0715)  BP: (!) 145/67 (01/12/22 0715)  SpO2: 98 % (01/12/22 0715)  O2 Device (Oxygen Therapy): ventilator (01/12/22 1250) Vital Signs (24h Range):  Temp:  [97.9 °F (36.6 °C)-98.6 °F (37 °C)] 97.9 °F (36.6 °C)  Pulse:  [70-98] 89  Resp:  [13-39] 32  SpO2:  [96 %-100 %] 100 %  BP: ()/(44-79) 143/69     Weight: 92.2 kg (203 lb 4.2 oz)  (01/12/22 0615)  Body mass index is 28.35 kg/m².  Body surface area is 2.15 meters squared.    I/O last 3 completed shifts:  In: 4911.2 [I.V.:85.9; NG/GT:1230; IV Piggyback:1100]  Out: 825 [Other:250; Stool:575]    Physical Exam  Vitals reviewed.   Constitutional:       Appearance: He is ill-appearing.   Pulmonary:      Comments: Ventilated  Abdominal:      General: Bowel sounds are normal.      Palpations: Abdomen is soft.         Significant Labs:  BMP:   Recent Labs   Lab 01/10/22  0539   *  318*   *  131*   K 3.8  3.7     98   CO2 19*  20*   BUN 65*  65*   CREATININE 4.68*  5.11*   CALCIUM 7.9*  7.4*   MG 2.1     CBC:   Recent Labs   Lab 01/10/22  0539   WBC 18.76*   RBC 2.28*   HGB 6.9*   HCT 19.4*   PLT 92*   MCV 85.1   MCH 30.3   MCHC 35.6        Significant Imaging:  Labs: Reviewed

## 2022-01-12 NOTE — ASSESSMENT & PLAN NOTE
- 12/14 intubated, tracheostomy 1/4  Currently very ill have not been able to wean  1/11- remains very sick. Not able to tolerate any weaning trials. He is oxygenating adequately on 40%. Prognosis is poor.  1/12- no significant change. He is critically ill and not able to tolerate weaning trials currently

## 2022-01-12 NOTE — OP NOTE
[facility], [place] - Periop Services  Surgery Department  Operative Note    SUMMARY     Date of Procedure: 1/12/2022     Procedure: Procedure(s) (LRB):  WASHOUT (N/A)     Surgeon(s) and Role:     * Harish Cazares MD - Primary    Assisting Surgeon: None    Pre-Operative Diagnosis: Necrotizing fasciitis [M72.6]    Post-Operative Diagnosis: Post-Op Diagnosis Codes:     * Necrotizing fasciitis [M72.6]    Anesthesia: General/MAC    Procedures Performed:  Washout and debridement of abdomen and rectum    Significant Findings of the Procedure:  No residual purulence in the abdominal cavity.  Very minimal fibrinous material.    Procedure in Detail:  After informed consent patient brought to the OR prepped and draped in the usual sterile fashion.  All dressings were taken out of the abdomen and we inspected the abdominal cavity.  Very mild amount of fibrinous material in the areas in the above the bladder and around the right pericolic gutter which debrided some of this fibrinous material.  We then irrigated this out placed a white sponge on small area where the bowel was exposed between the omentum and the bladder area.  Black sponge was placed on top of this area.  We then placed a wound VAC on in the colostomy.  We turned our attention to the rectal area.  Rectum was relatively clean with no additional purulence or fibrous material and we irrigated this out and then packed this with a Kerlix wet to dry.  Area was dressed and patient tolerated the procedures well.    Complications: No    Estimated Blood Loss (EBL): * No values recorded between 1/12/2022  9:36 AM and 1/12/2022 10:10 AM *           Implants: * No implants in log *    Specimens:   Specimen (24h ago, onward)            None                  Condition: Good    Disposition: PACU - hemodynamically stable.    Attestation: I was present and scrubbed for the entire procedure.

## 2022-01-13 NOTE — ASSESSMENT & PLAN NOTE
- nephrology consulted   - no sign of renal recovery.  Continuing with scheduled hemodialysis for this patient.  - tunneled HD line placement 12/30  Continues with dialysis  1/13 nephrology note reviewed and appreciated. No renal recovery. Continue with HD per nephrology

## 2022-01-13 NOTE — PROGRESS NOTES
[facility]  Nephrology  Progress Note    Patient Name: Og Garcia  MRN: [id number]  Admission Date: 12/23/2021  Hospital Length of Stay: 21 days  Attending Provider: Cecil Abernathy DO   Primary Care Physician: Hector Arndt DNP, FNP-C  Principal Problem:Denice gangrene    Subjective:     HPI: The patient is known from the previous nephrology consult at [facility].  He is no at Specialty and continues to need dialysis support.      Interval History: The patient is the same.  He is getting PRBCs today.  He remains critical.    Review of patient's allergies indicates:  No Known Allergies  Current Facility-Administered Medications   Medication Frequency    0.9%  NaCl infusion (for blood administration) Q24H PRN    0.9%  NaCl infusion PRN    0.9%  NaCl infusion PRN    acetaminophen tablet 500 mg Q6H PRN    albuterol nebulizer solution 2.5 mg Q4H PRN    amino acid 5% - dextrose 20% solution with custom electrolytes & additives Continuous    amphotericin B liposome (AMBISOME) 450 mg in dextrose 5 % 450 mL IVPB Q24H    dextrose 50% injection 12.5 g PRN    fat emulsion 20% infusion 250 mL Daily    fentaNYL 2500 mcg in D5W 250 mL infusion premix (titrating) (conc: 10 mcg/mL) PRN    glucagon (human recombinant) injection 1 mg PRN    heparin (porcine) injection 4,000 Units PRN    heparin (porcine) injection 5,000 Units Q8H    insulin aspart U-100 injection 1-10 Units Q6H    insulin detemir U-100 injection 20 Units QHS    meropenem (MERREM) 500 mg in sodium chloride 0.9 % 100 mL IVPB (MB+) Q24H    metoprolol injection 5 mg Q5 Min PRN    NORepinephrine 32 mg in dextrose 5 % 250 mL infusion PRN    pantoprazole suspension 40 mg Daily    sevelamer carbonate pwpk 0.8 g TID WM    ticagrelor tablet 90 mg BID       Objective:     Vital Signs (Most Recent):  Temp: 99.2 °F (37.3 °C) (01/13/22 1228)  Pulse: 95 (01/13/22 0700)  Resp: 17 (01/13/22 0700)  BP: 125/60 (01/13/22 0700)  SpO2:  98 % (01/13/22 0700)  O2 Device (Oxygen Therapy): ventilator (01/13/22 0815) Vital Signs (24h Range):  Temp:  [97.9 °F (36.6 °C)-99.2 °F (37.3 °C)] 99.2 °F (37.3 °C)  Pulse:  [] 95  Resp:  [11-42] 17  SpO2:  [96 %-100 %] 98 %  BP: ()/(48-84) 125/60     Weight: 94.8 kg (208 lb 15.9 oz) (01/13/22 0642)  Body mass index is 29.15 kg/m².  Body surface area is 2.18 meters squared.    I/O last 3 completed shifts:  In: 3696.2 [I.V.:46.2; Other:500; NG/GT:1600; IV Piggyback:1150]  Out: 2800 [Other:2300; Stool:500]    Physical Exam  Vitals reviewed.   Constitutional:       Appearance: He is ill-appearing.   HENT:      Mouth/Throat:      Mouth: Mucous membranes are dry.   Cardiovascular:      Rate and Rhythm: Regular rhythm.      Heart sounds: Normal heart sounds.   Pulmonary:      Comments: The patient is ventilated  Abdominal:      General: Bowel sounds are normal.      Palpations: Abdomen is soft.      Comments: NGTube in place         Significant Labs:  BMP:   Recent Labs   Lab 01/12/22  1441   *      K 3.3*      CO2 23   BUN 74*   CREATININE 4.12*   CALCIUM 5.7*   MG 1.9     CBC:   Recent Labs   Lab 01/13/22  0432   WBC 23.21*   RBC 1.67*   HGB 5.0*   HCT 14.5*      MCV 86.8   MCH 29.9   MCHC 34.5        Significant Imaging:  Labs: Reviewed    Assessment/Plan:     RAHAT (acute kidney injury)  Dialysis Monday   Continue with scheduled hemodialysis for this patient.  1/5/2022 Dialysis done today.  No other changes  1/6/2022 Continue with dialysis on tomorrow.  1/7/2022  Dialysis as scheduled today.  And dialysis on tomorrow.  1/10/2022 Dialysis as scheduled.  1/11/2022 No signs of renal recovery.  Continue with dialysis management  1/12/2022 Continue with scheduled hemodialysis for this patient.  1/13/2022 Dialysis scheduled for tomorrow.    On mechanically assisted ventilation  1. Wean as tolerated  2.  Plan for trach  1/13/2022 Continue with current ventilation        Thank you for your  consult. I will follow-up with patient. Please contact us if you have any additional questions.    Edwin Pryor Jr, MD  Nephrology  [facility]

## 2022-01-13 NOTE — ASSESSMENT & PLAN NOTE
- 12/14 intubated, tracheostomy 1/4  Currently very ill have not been able to wean  1/11- remains very sick. Not able to tolerate any weaning trials. He is oxygenating adequately on 40%. Prognosis is poor.  1/13- no significant change. He is critically ill and not able to tolerate weaning trials currently   If I can get him off of pressors I will try some SIMV with reduced rate.

## 2022-01-13 NOTE — PROGRESS NOTES
[facility]  Adult Nutrition  Follow-up Note         Reason for Assessment  Reason For Assessment: RD follow-up  Nutrition Risk Screen: (P) tube feeding or parenteral nutrition,large or nonhealing wound, burn or pressure injury  Malnutrition  Is Patient Malnourished: No  Nutrition Diagnosis  Increased protein Needs   related to Decreased/ impaired skin integrity as evidenced by wounds    Nutrition Diagnosis Status: Improving      Nutrition Risk  Level of Risk/Frequency of Follow-up: high   Chewing or Swallowing Difficulty?: Swallowing difficulty  Estimated/Assessed Needs  RMR ([place]. Jeor Equation): 1724.13 Activity Factor: 1 Injury Factor: 1.2   Total Ve: 12.5 mL Temp: 98.9 °F (37.2 °C)Axillary  Weight Used For Calorie Calculations: 92.2 kg (203 lb 4.2 oz)   Energy Need Method: [place] (modified) Energy Calorie Requirements (kcal): 2065  Weight Used For Protein Calculations: 81 kg (178 lb 9.2 oz) (adjusted body wt)  Protein Requirements:   Estimated Fluid Requirement Method: RDA Method Fluid Requirements (mL): 2065  RDA Method (mL): 2065     Nutrition Prescription / Recommendations  Recommendation/Intervention: DC TPN; TF goal: Vital AF @ 75ml/hr; H20 flush of 60ml q2hr  Goals: discontinue TPN; Pt will tolerate TF at goal rate of 75ml/hr; Pt will meet estimated nutritional needs; wound healing  Nutrition Goal Status: progressing towards goal  Communication of RD Recs: reviewed with physician  Current Diet Order: TF: Vital AF @ 55ml/hr; H20 flush of 50ml q2hr. TPN: Clinimix D5/20 @ 40ml/hr.  Nutrition Order Comments: TF: Vital AF @ 55ml/hr; H20 flush of 50ml q2hr. TPN: Clinimix D5/20 @ 40ml/hr.  Current Nutrition Support Formula Ordered: Vital AF 1.2,Clinimix E 5/20  Current Nutrition Support Rate Ordered:  (TPN: 40, TF: 55)  Current Nutrition Support Frequency Ordered: hourly  Recommended Diet: NPO  Recommended Oral Supplement: No Oral Supplements  Is Nutrition Support  Recommended: No  Is Education Recommended: No  Monitor and Evaluation  % current Intake: Enteral Nutrition progressing to goal and On TPN meeting 40% of Caloric needs and 50% of Protein needs  % intake to meet estimated needs: Enteral Nutrition  Vital AF (goal rate of 75ml/hr); H20 flush of 60ml q 2hr;  Food and Nutrient Intake: enteral nutrition intake,parenteral nutrition intake  Food and Nutrient Adminstration: enteral and parenteral nutrition administration  Anthropometric Measurements: height/length,body mass index,weight,weight change  Biochemical Data, Medical Tests and Procedures: electrolyte and renal panel,glucose/endocrine profile  Nutrition-Focused Physical Findings: skin  Enteral Calories (kcal): 1584  Enteral Protein (gm): 99  Enteral (Free Water) Fluid (mL): 1069  Free Water Flush Fluid (mL): 600  Parenteral Calories (kcal): 845  Parenteral Protein (gm): 48  Parenteral Fluid (mL): 960  Lipid Calories (kcals): 500 kcals  Other Calories (kcal): 528 (propofol)  Total Calories (kcal): 2420  Total Calories (kcal/kg): 2612  % Kcal Needs: 100  Total Protein (gm): 147  % Protein Needs: 100  IV Fluid (mL): 1764  Total Fluid Intake (mL): 2629  Energy Calories Required: exceeds needs  Protein Required: exceeds needs  Fluid Required: exceeds needs  Tolerance: tolerating  Current Medical Diagnosis and Past Medical History  Diagnosis: gastrointestinal disease,infection/sepsis  Past Medical History:   Diagnosis Date    Hyperlipidemia     Hypertension      Nutrition/Diet History  Spiritual, Cultural Beliefs, [employer] Practices, Values that Affect Care: no  Food Allergies: NKFA  Factors Affecting Nutritional Intake: None identified at this time  Lab/Procedures/Meds  Recent Labs   Lab 01/12/22  1441      K 3.3*   BUN 74*   CREATININE 4.12*   *   CALCIUM 5.7*   ALBUMIN 0.6*      PHOS 4.0     Last A1c: No results found for: HGBA1C  Lab Results   Component Value Date    RBC 1.67 (L) 01/13/2022    HGB  "5.0 (LL) 01/13/2022    HCT 14.5 (LL) 01/13/2022    MCV 86.8 01/13/2022    MCH 29.9 01/13/2022    MCHC 34.5 01/13/2022     Pertinent Labs Reviewed: reviewed  Pertinent Labs Comments: Hct 14.5, K 3.3, BUN 74, Creat 4.14, Glu 145. Alb .6  Pertinent Medications Reviewed: reviewed  Pertinent Medications Comments: heparin, insulin  Anthropometrics  Temp: 98.9 °F (37.2 °C)  Height Method: Stated  Height: 5' 11" (180.3 cm)  Height (inches): 71 in  Weight Method: Bed Scale  Weight: 92.2 kg (203 lb 4.2 oz)  Weight (lb): 203.27 lb  Ideal Body Weight (IBW), Male: 172 lb  % Ideal Body Weight, Male (lb): 115.36 %  BMI (Calculated): 28.4  BMI Grade: 25 - 29.9 - overweight     Nutrition by Nursing  Diet/Nutrition Received: tube feeding     Diet/Feeding Assistance: total feed  Diet/Feeding Tolerance: other (see comments) (tpn)  Last Bowel Movement: (P) 01/12/22       NG/OG Tube [place] sump 18 Fr. Left nostril-Feeding Type: (P) continuous,by pump       NG/OG Tube [place] sump 18 Fr. Left nostril-Current Rate (mL/hr): 50 mL/hr       NG/OG Tube [place] sump 18 Fr. Left nostril-Goal Rate (mL/hr): 55 mL/hr       NG/OG Tube [place] sump 18 Fr. Left nostril-Formula Name: (P) Vital  Nutrition Follow-Up  RD Follow-up?: Yes  Assessment and Plan  No new Assessment & Plan notes have been filed under this hospital service since the last note was generated.  Service: Nutrition     "

## 2022-01-13 NOTE — ASSESSMENT & PLAN NOTE
Dialysis Monday   Continue with scheduled hemodialysis for this patient.  1/5/2022 Dialysis done today.  No other changes  1/6/2022 Continue with dialysis on tomorrow.  1/7/2022  Dialysis as scheduled today.  And dialysis on tomorrow.  1/10/2022 Dialysis as scheduled.  1/11/2022 No signs of renal recovery.  Continue with dialysis management  1/12/2022 Continue with scheduled hemodialysis for this patient.  1/13/2022 Dialysis scheduled for tomorrow.

## 2022-01-13 NOTE — ASSESSMENT & PLAN NOTE
- H/H down to 5.5/16 - pt had bleeding from his HD insertion site yesterday this has now resolved.   - will transfuse 2 units PRBCs on HD  1/2- 9.7/27.5 - repeat CBC in AM  1/4- Hgb is 7.2. Not sure if he will be transfused during surgery. IF not we will try to get  Him transfused with next HD  01/06/2021 transfuse 1 unit of packed red blood cells today  01/10/2021 needs a unit of blood  1/11- repeat H&H in am. Continue to transfuse as needed.  1/13- Hgb is 5. Transfusing today. Post transfusion H&H

## 2022-01-13 NOTE — ASSESSMENT & PLAN NOTE
- had been on almost a month of antimicrobials, high risk for MDR organisms. Gram negative on resp culture  - repeat blood cultures with NGTD  - changed unasyn to merrem 1/9---treat for 7 days and dc as long as afebrile

## 2022-01-13 NOTE — ASSESSMENT & PLAN NOTE
- s/p multiple debridements  - wound vac in place  - ID consulted for antibiotic management: He was treated with rocephin, daptomycin, clindamycin. 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 Amphotericin started on 1/7. The patient went to OR on 1/10 for debridement. Surgery note reviewed and appreciated.  1/12- back to OR today.  1/13- OR note reviewed. No purulence noted.

## 2022-01-13 NOTE — PROGRESS NOTES
[facility]  Pulmonology  Progress Note    Patient Name: Og Garcia  MRN: [id number]  Admission Date: 12/23/2021  Hospital Length of Stay: 21 days  Code Status: Prior  Attending Provider: Cecil Abernathy DO  Primary Care Provider: Hector Arndt DNP, FNP-C   Principal Problem: Denice gangrene    Subjective:     Interval History: The patient remains critically ill. He remains on the ventilator. He remains on pressors. Currently afebrile. Transfusing today.    Objective:     Vital Signs (Most Recent):  Temp: 99.2 °F (37.3 °C) (01/13/22 1300)  Pulse: 102 (01/13/22 1530)  Resp: (!) 27 (01/13/22 1530)  BP: (!) 140/70 (01/13/22 1530)  SpO2: 98 % (01/13/22 1530) Vital Signs (24h Range):  Temp:  [98.8 °F (37.1 °C)-99.2 °F (37.3 °C)] 99.2 °F (37.3 °C)  Pulse:  [] 102  Resp:  [11-42] 27  SpO2:  [96 %-100 %] 98 %  BP: ()/(48-84) 140/70     Weight: 94.8 kg (208 lb 15.9 oz)  Body mass index is 29.15 kg/m².      Intake/Output Summary (Last 24 hours) at 1/13/2022 1543  Last data filed at 1/13/2022 1501  Gross per 24 hour   Intake 2460 ml   Output 3000 ml   Net -540 ml       Physical Exam  Vitals reviewed.   Constitutional:       General: He is not in acute distress.     Appearance: He is ill-appearing.   HENT:      Head: Normocephalic and atraumatic.      Comments: Right temporal deformity     Right Ear: External ear normal.      Left Ear: External ear normal.      Nose: Nose normal.      Mouth/Throat:      Mouth: Mucous membranes are moist.      Comments: trach  Eyes:      Conjunctiva/sclera: Conjunctivae normal.      Pupils: Pupils are equal, round, and reactive to light.   Cardiovascular:      Rate and Rhythm: Regular rhythm. Tachycardia present.      Pulses: Normal pulses.      Heart sounds: Normal heart sounds.   Pulmonary:      Effort: No respiratory distress.      Comments: coarse breath sounds anteriorly  Abdominal:      Palpations: Abdomen is soft.   Musculoskeletal:          General: Swelling present.      Cervical back: Neck supple.      Right lower leg: Edema present.      Left lower leg: Edema present.   Lymphadenopathy:      Cervical: No cervical adenopathy.   Skin:     General: Skin is warm and dry.   Neurological:      Cranial Nerves: No cranial nerve deficit.      Comments: Remains on mechanical ventilation. Not responding to verbal commands         Vents:  Vent Mode: A/C (01/13/22 0815)  Set Rate: 16 BPM (01/13/22 0815)  Vt Set: 480 mL (01/13/22 0815)  Pressure Support: 12 cmH20 (01/07/22 2230)  PEEP/CPAP: 5 cmH20 (01/13/22 0815)  Oxygen Concentration (%): 40 (same settings as am assessment) (01/13/22 1100)  Peak Airway Pressure: 27 cmH2O (01/13/22 0815)  Total Ve: 14.4 mL (01/13/22 0815)  F/VT Ratio<105 (RSBI): (!) 41.56 (01/13/22 0815)    Lines/Drains/Airways     Peripherally Inserted Central Catheter Line            PICC Double Lumen 01/05/22 left basilic 8 days          Central Venous Catheter Line                 Hemodialysis Catheter 12/30/21 0900 right internal jugular 14 days          Drain                 NG/OG Tube [place] sump 18 Fr. Left nostril -- days         Colostomy 12/18/21 1030 Descending/sigmoid LUQ 26 days          Airway                 Surgical Airway 01/04/22 Shiley 9 days          Peripheral Intravenous Line                 Peripheral IV - Single Lumen 01/08/22 1256 20 G Anterior;Left Forearm 5 days                Significant Labs:    CBC/Anemia Profile:  Recent Labs   Lab 01/13/22  0432   WBC 23.21*   HGB 5.0*   HCT 14.5*      MCV 86.8   RDW 14.4        Chemistries:  Recent Labs   Lab 01/12/22  1441      K 3.3*      CO2 23   BUN 74*   CREATININE 4.12*   CALCIUM 5.7*   ALBUMIN 0.6*   BILITOT 1.1   ALKPHOS 310*   MG 1.9   PHOS 4.0       All pertinent labs within the past 24 hours have been reviewed.    Significant Imaging:  I have reviewed all pertinent imaging results/findings within the past 24 hours.    Assessment/Plan:     * Denice  gangrene  - s/p multiple debridements  - wound vac in place  - ID consulted for antibiotic management: He was treated with rocephin, daptomycin, clindamycin. 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 Amphotericin started on 1/7. The patient went to OR on 1/10 for debridement. Surgery note reviewed and appreciated.  1/12- back to OR today.  1/13- OR note reviewed. No purulence noted.    Ventilator associated pneumonia  - had been on almost a month of antimicrobials, high risk for MDR organisms. Gram negative on resp culture  - repeat blood cultures with NGTD  - changed unasyn to merrem 1/9---treat for 7 days and dc as long as afebrile    Shock  - remains on pressors but bp is improving      Type 2 diabetes mellitus without complication, without long-term current use of insulin  - continue basal/bolus insulin  -accuchecks are reasonable currently  - he is on tpn and started on tube feeds. Will increase tube feeds. As he reaches goal we will wean TPN    Acute blood loss anemia  - H/H down to 5.5/16 - pt had bleeding from his HD insertion site yesterday this has now resolved.   - will transfuse 2 units PRBCs on HD  1/2- 9.7/27.5 - repeat CBC in AM  1/4- Hgb is 7.2. Not sure if he will be transfused during surgery. IF not we will try to get  Him transfused with next HD  01/06/2021 transfuse 1 unit of packed red blood cells today  01/10/2021 needs a unit of blood  1/11- repeat H&H in am. Continue to transfuse as needed.  1/13- Hgb is 5. Transfusing today. Post transfusion H&H    RAHAT (acute kidney injury)  -  nephrology consulted   - no sign of renal recovery.  Continuing with scheduled hemodialysis for this patient.  - tunneled HD line placement 12/30  Continues with dialysis  1/13 nephrology note reviewed and appreciated. No renal recovery. Continue with HD per nephrology      On mechanically assisted ventilation  - 12/14 intubated, tracheostomy 1/4  Currently very ill have not been able to wean  1/11- remains very sick. Not able to tolerate any weaning trials. He is oxygenating adequately on 40%. Prognosis is poor.  1/13- no significant change. He is critically ill and not able to tolerate weaning trials currently   If I can get him off of pressors I will try some SIMV with reduced rate.      Hypertension  Currently hypotensive. Holding any antihypertensives        The patient remains critically ill. This note includes approximately 30 minutes of critical care time spent in the management of this patient. This includes review of labs, data, imaging, and vent management.         Cecil Abernathy, DO  Pulmonology  [facility]

## 2022-01-13 NOTE — SUBJECTIVE & OBJECTIVE
Interval History: The patient remains critically ill. He remains on the ventilator. He remains on pressors. Currently afebrile. Transfusing today.    Objective:     Vital Signs (Most Recent):  Temp: 99.2 °F (37.3 °C) (01/13/22 1300)  Pulse: 102 (01/13/22 1530)  Resp: (!) 27 (01/13/22 1530)  BP: (!) 140/70 (01/13/22 1530)  SpO2: 98 % (01/13/22 1530) Vital Signs (24h Range):  Temp:  [98.8 °F (37.1 °C)-99.2 °F (37.3 °C)] 99.2 °F (37.3 °C)  Pulse:  [] 102  Resp:  [11-42] 27  SpO2:  [96 %-100 %] 98 %  BP: ()/(48-84) 140/70     Weight: 94.8 kg (208 lb 15.9 oz)  Body mass index is 29.15 kg/m².      Intake/Output Summary (Last 24 hours) at 1/13/2022 1543  Last data filed at 1/13/2022 1501  Gross per 24 hour   Intake 2460 ml   Output 3000 ml   Net -540 ml       Physical Exam  Vitals reviewed.   Constitutional:       General: He is not in acute distress.     Appearance: He is ill-appearing.   HENT:      Head: Normocephalic and atraumatic.      Comments: Right temporal deformity     Right Ear: External ear normal.      Left Ear: External ear normal.      Nose: Nose normal.      Mouth/Throat:      Mouth: Mucous membranes are moist.      Comments: trach  Eyes:      Conjunctiva/sclera: Conjunctivae normal.      Pupils: Pupils are equal, round, and reactive to light.   Cardiovascular:      Rate and Rhythm: Regular rhythm. Tachycardia present.      Pulses: Normal pulses.      Heart sounds: Normal heart sounds.   Pulmonary:      Effort: No respiratory distress.      Comments: coarse breath sounds anteriorly  Abdominal:      Palpations: Abdomen is soft.   Musculoskeletal:         General: Swelling present.      Cervical back: Neck supple.      Right lower leg: Edema present.      Left lower leg: Edema present.   Lymphadenopathy:      Cervical: No cervical adenopathy.   Skin:     General: Skin is warm and dry.   Neurological:      Cranial Nerves: No cranial nerve deficit.      Comments: Remains on mechanical ventilation.  Not responding to verbal commands         Vents:  Vent Mode: A/C (01/13/22 0815)  Set Rate: 16 BPM (01/13/22 0815)  Vt Set: 480 mL (01/13/22 0815)  Pressure Support: 12 cmH20 (01/07/22 2230)  PEEP/CPAP: 5 cmH20 (01/13/22 0815)  Oxygen Concentration (%): 40 (same settings as am assessment) (01/13/22 1100)  Peak Airway Pressure: 27 cmH2O (01/13/22 0815)  Total Ve: 14.4 mL (01/13/22 0815)  F/VT Ratio<105 (RSBI): (!) 41.56 (01/13/22 0815)    Lines/Drains/Airways     Peripherally Inserted Central Catheter Line            PICC Double Lumen 01/05/22 left basilic 8 days          Central Venous Catheter Line                 Hemodialysis Catheter 12/30/21 0900 right internal jugular 14 days          Drain                 NG/OG Tube [place] sump 18 Fr. Left nostril -- days         Colostomy 12/18/21 1030 Descending/sigmoid LUQ 26 days          Airway                 Surgical Airway 01/04/22 Shiley 9 days          Peripheral Intravenous Line                 Peripheral IV - Single Lumen 01/08/22 1256 20 G Anterior;Left Forearm 5 days                Significant Labs:    CBC/Anemia Profile:  Recent Labs   Lab 01/13/22  0432   WBC 23.21*   HGB 5.0*   HCT 14.5*      MCV 86.8   RDW 14.4        Chemistries:  Recent Labs   Lab 01/12/22  1441      K 3.3*      CO2 23   BUN 74*   CREATININE 4.12*   CALCIUM 5.7*   ALBUMIN 0.6*   BILITOT 1.1   ALKPHOS 310*   MG 1.9   PHOS 4.0       All pertinent labs within the past 24 hours have been reviewed.    Significant Imaging:  I have reviewed all pertinent imaging results/findings within the past 24 hours.

## 2022-01-13 NOTE — SUBJECTIVE & OBJECTIVE
Interval History: The patient is the same.  He is getting PRBCs today.  He remains critical.    Review of patient's allergies indicates:  No Known Allergies  Current Facility-Administered Medications   Medication Frequency    0.9%  NaCl infusion (for blood administration) Q24H PRN    0.9%  NaCl infusion PRN    0.9%  NaCl infusion PRN    acetaminophen tablet 500 mg Q6H PRN    albuterol nebulizer solution 2.5 mg Q4H PRN    amino acid 5% - dextrose 20% solution with custom electrolytes & additives Continuous    amphotericin B liposome (AMBISOME) 450 mg in dextrose 5 % 450 mL IVPB Q24H    dextrose 50% injection 12.5 g PRN    fat emulsion 20% infusion 250 mL Daily    fentaNYL 2500 mcg in D5W 250 mL infusion premix (titrating) (conc: 10 mcg/mL) PRN    glucagon (human recombinant) injection 1 mg PRN    heparin (porcine) injection 4,000 Units PRN    heparin (porcine) injection 5,000 Units Q8H    insulin aspart U-100 injection 1-10 Units Q6H    insulin detemir U-100 injection 20 Units QHS    meropenem (MERREM) 500 mg in sodium chloride 0.9 % 100 mL IVPB (MB+) Q24H    metoprolol injection 5 mg Q5 Min PRN    NORepinephrine 32 mg in dextrose 5 % 250 mL infusion PRN    pantoprazole suspension 40 mg Daily    sevelamer carbonate pwpk 0.8 g TID WM    ticagrelor tablet 90 mg BID       Objective:     Vital Signs (Most Recent):  Temp: 99.2 °F (37.3 °C) (01/13/22 1228)  Pulse: 95 (01/13/22 0700)  Resp: 17 (01/13/22 0700)  BP: 125/60 (01/13/22 0700)  SpO2: 98 % (01/13/22 0700)  O2 Device (Oxygen Therapy): ventilator (01/13/22 0815) Vital Signs (24h Range):  Temp:  [97.9 °F (36.6 °C)-99.2 °F (37.3 °C)] 99.2 °F (37.3 °C)  Pulse:  [] 95  Resp:  [11-42] 17  SpO2:  [96 %-100 %] 98 %  BP: ()/(48-84) 125/60     Weight: 94.8 kg (208 lb 15.9 oz) (01/13/22 0642)  Body mass index is 29.15 kg/m².  Body surface area is 2.18 meters squared.    I/O last 3 completed shifts:  In: 3696.2 [I.V.:46.2; Other:500; NG/GT:1600;  IV Piggyback:1150]  Out: 2800 [Other:2300; Stool:500]    Physical Exam  Vitals reviewed.   Constitutional:       Appearance: He is ill-appearing.   HENT:      Mouth/Throat:      Mouth: Mucous membranes are dry.   Cardiovascular:      Rate and Rhythm: Regular rhythm.      Heart sounds: Normal heart sounds.   Pulmonary:      Comments: The patient is ventilated  Abdominal:      General: Bowel sounds are normal.      Palpations: Abdomen is soft.      Comments: NGTube in place         Significant Labs:  BMP:   Recent Labs   Lab 01/12/22  1441   *      K 3.3*      CO2 23   BUN 74*   CREATININE 4.12*   CALCIUM 5.7*   MG 1.9     CBC:   Recent Labs   Lab 01/13/22  0432   WBC 23.21*   RBC 1.67*   HGB 5.0*   HCT 14.5*      MCV 86.8   MCH 29.9   MCHC 34.5        Significant Imaging:  Labs: Reviewed

## 2022-01-14 NOTE — PROGRESS NOTES
[facility]  Pulmonology  Progress Note    Patient Name: Og Garcia  MRN: [id number]  Admission Date: 12/23/2021  Hospital Length of Stay: 22 days  Code Status: Prior  Attending Provider: Cecil Abernathy DO  Primary Care Provider: Hector Arndt DNP, FNP-C   Principal Problem: Denice gangrene    Subjective:     Interval History: The patient remains critically ill. He remains on the ventilator. He remains on pressors. BP looks a little better today. Plan for HD today.    Objective:     Vital Signs (Most Recent):  Temp: 98.8 °F (37.1 °C) (01/14/22 0700)  Pulse: 76 (01/14/22 0745)  Resp: (!) 26 (01/14/22 0745)  BP: (!) 130/59 (01/14/22 0745)  SpO2: 100 % (01/14/22 0745) Vital Signs (24h Range):  Temp:  [98.1 °F (36.7 °C)-99.2 °F (37.3 °C)] 98.8 °F (37.1 °C)  Pulse:  [] 76  Resp:  [12-42] 26  SpO2:  [97 %-100 %] 100 %  BP: (102-171)/(50-86) 130/59     Weight: 97 kg (213 lb 13.5 oz)  Body mass index is 29.83 kg/m².      Intake/Output Summary (Last 24 hours) at 1/14/2022 0812  Last data filed at 1/14/2022 0600  Gross per 24 hour   Intake 2566 ml   Output 700 ml   Net 1866 ml       Physical Exam  Vitals reviewed.   Constitutional:       General: He is not in acute distress.     Appearance: He is ill-appearing.   HENT:      Head: Normocephalic and atraumatic.      Comments: Right temporal deformity     Right Ear: External ear normal.      Left Ear: External ear normal.      Nose: Nose normal.      Mouth/Throat:      Mouth: Mucous membranes are moist.      Comments: trach  Eyes:      Conjunctiva/sclera: Conjunctivae normal.      Pupils: Pupils are equal, round, and reactive to light.   Cardiovascular:      Rate and Rhythm: Regular rhythm. Tachycardia present.      Pulses: Normal pulses.      Heart sounds: Normal heart sounds.   Pulmonary:      Effort: No respiratory distress.      Comments: coarse breath sounds anteriorly  Abdominal:      Palpations: Abdomen is soft.    Musculoskeletal:         General: Swelling present.      Cervical back: Neck supple.      Right lower leg: Edema present.      Left lower leg: Edema present.   Lymphadenopathy:      Cervical: No cervical adenopathy.   Skin:     General: Skin is warm and dry.   Neurological:      Cranial Nerves: No cranial nerve deficit.      Comments: Remains on mechanical ventilation. Not responding to verbal commands         Vents:  Vent Mode: A/C (01/14/22 0445)  Set Rate: 16 BPM (01/14/22 0445)  Vt Set: 480 mL (01/14/22 0445)  Pressure Support: 12 cmH20 (01/07/22 2230)  PEEP/CPAP: 5 cmH20 (01/14/22 0445)  Oxygen Concentration (%): 40 (01/14/22 0445)  Peak Airway Pressure: 15 cmH2O (01/14/22 0445)  Total Ve: 9.8 mL (01/14/22 0445)  F/VT Ratio<105 (RSBI): (!) 76.12 (01/14/22 0000)    Lines/Drains/Airways     Peripherally Inserted Central Catheter Line            PICC Double Lumen 01/05/22 left basilic 9 days          Central Venous Catheter Line                 Hemodialysis Catheter 12/30/21 0900 right internal jugular 14 days          Drain                 NG/OG Tube [place] sump 18 Fr. Left nostril -- days         Colostomy 12/18/21 1030 Descending/sigmoid LUQ 26 days          Airway                 Surgical Airway 01/04/22 Shiley 10 days          Peripheral Intravenous Line                 Peripheral IV - Single Lumen 01/08/22 1256 20 G Anterior;Left Forearm 5 days                Significant Labs:    CBC/Anemia Profile:  Recent Labs   Lab 01/13/22  0432   WBC 23.21*   HGB 5.0*   HCT 14.5*      MCV 86.8   RDW 14.4        Chemistries:  Recent Labs   Lab 01/12/22  1441      K 3.3*      CO2 23   BUN 74*   CREATININE 4.12*   CALCIUM 5.7*   ALBUMIN 0.6*   BILITOT 1.1   ALKPHOS 310*   MG 1.9   PHOS 4.0       All pertinent labs within the past 24 hours have been reviewed.    Significant Imaging:  I have reviewed all pertinent imaging results/findings within the past 24 hours.    Assessment/Plan:     * Denice  gangrene  - s/p multiple debridements  - wound vac in place  - ID consulted for antibiotic management: He was treated with rocephin, daptomycin, clindamycin. 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 Amphotericin started on 1/7. The patient went to OR on 1/10 for debridement. Surgery note reviewed and appreciated.  1/12- back to OR today.  1/13- OR note reviewed. No purulence noted.    Ventilator associated pneumonia  - had been on almost a month of antimicrobials, high risk for MDR organisms. Gram negative on resp culture  - repeat blood cultures with NGTD  - changed unasyn to merrem 1/9---treat for 7 days and dc as long as afebrile    Shock  - remains on pressors but bp is improving      Type 2 diabetes mellitus without complication, without long-term current use of insulin  - continue basal/bolus insulin  -accuchecks are reasonable currently  - he is on tpn and started on tube feeds. Will increase tube feeds. As he reaches goal we will wean TPN  - plan to start weaning TPN today    Acute blood loss anemia  - H/H down to 5.5/16 - pt had bleeding from his HD insertion site yesterday this has now resolved.   - will transfuse 2 units PRBCs on HD  1/2- 9.7/27.5 - repeat CBC in AM  1/4- Hgb is 7.2. Not sure if he will be transfused during surgery. IF not we will try to get  Him transfused with next HD  01/06/2021 transfuse 1 unit of packed red blood cells today  01/10/2021 needs a unit of blood  1/11- repeat H&H in am. Continue to transfuse as needed.  1/13- Hgb is 5. Transfusing today. Post transfusion  H&H  1/14- CBC pending for today    RAHAT (acute kidney injury)  - nephrology consulted   - no sign of renal recovery.  Continuing with scheduled hemodialysis for this patient.  - tunneled HD line placement 12/30  Continues with dialysis  1/13 nephrology note reviewed and appreciated. No renal recovery. Continue with HD per nephrology  1/14- Plan for HD today      On mechanically assisted ventilation  - 12/14 intubated, tracheostomy 1/4  Currently very ill have not been able to wean  1/11- remains very sick. Not able to tolerate any weaning trials. He is oxygenating adequately on 40%. Prognosis is poor.  1/13- no significant change. He is critically ill and not able to tolerate weaning trials currently   If I can get him off of pressors I will try some SIMV with reduced rate.  1/14- placed him on PSV while I was in the room and he did well. We will try 1 hour bid today      Hypertension  Currently hypotensive. Holding any antihypertensives  BP looks a little better this am. Weaning pressor as tolerated        The patient remains critically ill. This note includes approximately 30 minutes of critical care time spent in the management of this patient. This includes review of labs, data, imaging, and vent management. This also includes discussion with decision maker.         Cecil Abernathy, DO  Pulmonology  [facility]

## 2022-01-14 NOTE — ASSESSMENT & PLAN NOTE
Currently hypotensive. Holding any antihypertensives  BP looks a little better this am. Weaning pressor as tolerated

## 2022-01-14 NOTE — NURSING
Wound care note: pt assessed by Dr Cazares today. Wound vac reapplied to abd with settings at 125. Cont order for MWF wound vac changes.

## 2022-01-14 NOTE — ASSESSMENT & PLAN NOTE
- 12/14 intubated, tracheostomy 1/4  Currently very ill have not been able to wean  1/11- remains very sick. Not able to tolerate any weaning trials. He is oxygenating adequately on 40%. Prognosis is poor.  1/13- no significant change. He is critically ill and not able to tolerate weaning trials currently   If I can get him off of pressors I will try some SIMV with reduced rate.  1/14- placed him on PSV while I was in the room and he did well. We will try 1 hour bid today

## 2022-01-14 NOTE — SUBJECTIVE & OBJECTIVE
Interval History: The patient remains critically ill. He remains on the ventilator. He remains on pressors. BP looks a little better today. Plan for HD today.    Objective:     Vital Signs (Most Recent):  Temp: 98.8 °F (37.1 °C) (01/14/22 0700)  Pulse: 76 (01/14/22 0745)  Resp: (!) 26 (01/14/22 0745)  BP: (!) 130/59 (01/14/22 0745)  SpO2: 100 % (01/14/22 0745) Vital Signs (24h Range):  Temp:  [98.1 °F (36.7 °C)-99.2 °F (37.3 °C)] 98.8 °F (37.1 °C)  Pulse:  [] 76  Resp:  [12-42] 26  SpO2:  [97 %-100 %] 100 %  BP: (102-171)/(50-86) 130/59     Weight: 97 kg (213 lb 13.5 oz)  Body mass index is 29.83 kg/m².      Intake/Output Summary (Last 24 hours) at 1/14/2022 0812  Last data filed at 1/14/2022 0600  Gross per 24 hour   Intake 2566 ml   Output 700 ml   Net 1866 ml       Physical Exam  Vitals reviewed.   Constitutional:       General: He is not in acute distress.     Appearance: He is ill-appearing.   HENT:      Head: Normocephalic and atraumatic.      Comments: Right temporal deformity     Right Ear: External ear normal.      Left Ear: External ear normal.      Nose: Nose normal.      Mouth/Throat:      Mouth: Mucous membranes are moist.      Comments: trach  Eyes:      Conjunctiva/sclera: Conjunctivae normal.      Pupils: Pupils are equal, round, and reactive to light.   Cardiovascular:      Rate and Rhythm: Regular rhythm. Tachycardia present.      Pulses: Normal pulses.      Heart sounds: Normal heart sounds.   Pulmonary:      Effort: No respiratory distress.      Comments: coarse breath sounds anteriorly  Abdominal:      Palpations: Abdomen is soft.   Musculoskeletal:         General: Swelling present.      Cervical back: Neck supple.      Right lower leg: Edema present.      Left lower leg: Edema present.   Lymphadenopathy:      Cervical: No cervical adenopathy.   Skin:     General: Skin is warm and dry.   Neurological:      Cranial Nerves: No cranial nerve deficit.      Comments: Remains on mechanical  ventilation. Not responding to verbal commands         Vents:  Vent Mode: A/C (01/14/22 0445)  Set Rate: 16 BPM (01/14/22 0445)  Vt Set: 480 mL (01/14/22 0445)  Pressure Support: 12 cmH20 (01/07/22 2230)  PEEP/CPAP: 5 cmH20 (01/14/22 0445)  Oxygen Concentration (%): 40 (01/14/22 0445)  Peak Airway Pressure: 15 cmH2O (01/14/22 0445)  Total Ve: 9.8 mL (01/14/22 0445)  F/VT Ratio<105 (RSBI): (!) 76.12 (01/14/22 0000)    Lines/Drains/Airways     Peripherally Inserted Central Catheter Line            PICC Double Lumen 01/05/22 left basilic 9 days          Central Venous Catheter Line                 Hemodialysis Catheter 12/30/21 0900 right internal jugular 14 days          Drain                 NG/OG Tube [place] sump 18 Fr. Left nostril -- days         Colostomy 12/18/21 1030 Descending/sigmoid LUQ 26 days          Airway                 Surgical Airway 01/04/22 Shiley 10 days          Peripheral Intravenous Line                 Peripheral IV - Single Lumen 01/08/22 1256 20 G Anterior;Left Forearm 5 days                Significant Labs:    CBC/Anemia Profile:  Recent Labs   Lab 01/13/22  0432   WBC 23.21*   HGB 5.0*   HCT 14.5*      MCV 86.8   RDW 14.4        Chemistries:  Recent Labs   Lab 01/12/22  1441      K 3.3*      CO2 23   BUN 74*   CREATININE 4.12*   CALCIUM 5.7*   ALBUMIN 0.6*   BILITOT 1.1   ALKPHOS 310*   MG 1.9   PHOS 4.0       All pertinent labs within the past 24 hours have been reviewed.    Significant Imaging:  I have reviewed all pertinent imaging results/findings within the past 24 hours.

## 2022-01-14 NOTE — PLAN OF CARE
Problem: Bleeding (Sepsis/Septic Shock)  Goal: Absence of Bleeding  Outcome: Ongoing, Progressing     Problem: Oral Intake Inadequate (Acute Kidney Injury/Impairment)  Goal: Optimal Nutrition Intake  Outcome: Ongoing, Progressing

## 2022-01-14 NOTE — SUBJECTIVE & OBJECTIVE
Interval History: The patient is resting.  His situation continues to be the same.  No other changes.    Review of patient's allergies indicates:  No Known Allergies  Current Facility-Administered Medications   Medication Frequency    0.9%  NaCl infusion (for blood administration) Q24H PRN    0.9%  NaCl infusion PRN    0.9%  NaCl infusion PRN    acetaminophen tablet 500 mg Q6H PRN    albuterol nebulizer solution 2.5 mg Q4H PRN    amphotericin B liposome (AMBISOME) 450 mg in dextrose 5 % 450 mL IVPB Q24H    dextrose 50% injection 12.5 g PRN    fentaNYL 2500 mcg in D5W 250 mL infusion premix (titrating) (conc: 10 mcg/mL) PRN    glucagon (human recombinant) injection 1 mg PRN    heparin (porcine) injection 4,000 Units PRN    heparin (porcine) injection 5,000 Units Q8H    insulin aspart U-100 injection 1-10 Units Q6H    insulin detemir U-100 injection 20 Units QHS    meropenem (MERREM) 500 mg in sodium chloride 0.9 % 100 mL IVPB (MB+) Q24H    metoprolol injection 5 mg Q5 Min PRN    NORepinephrine 32 mg in dextrose 5 % 250 mL infusion PRN    pantoprazole suspension 40 mg Daily    sevelamer carbonate pwpk 0.8 g TID WM    ticagrelor tablet 90 mg BID       Objective:     Vital Signs (Most Recent):  Temp: 97.5 °F (36.4 °C) (01/14/22 1115)  Pulse: 86 (01/14/22 1345)  Resp: (!) 32 (01/14/22 1345)  BP: (!) 143/65 (01/14/22 1345)  SpO2: 100 % (01/14/22 1345)  O2 Device (Oxygen Therapy): ventilator (01/14/22 0445) Vital Signs (24h Range):  Temp:  [97.5 °F (36.4 °C)-99.2 °F (37.3 °C)] 97.5 °F (36.4 °C)  Pulse:  [] 86  Resp:  [10-42] 32  SpO2:  [94 %-100 %] 100 %  BP: ()/(39-90) 143/65     Weight: 97 kg (213 lb 13.5 oz) (01/14/22 0600)  Body mass index is 29.83 kg/m².  Body surface area is 2.2 meters squared.    I/O last 3 completed shifts:  In: 4036 [I.V.:250; Blood:406; NG/GT:2680; IV Piggyback:700]  Out: 1000 [Other:200; Stool:800]    Physical Exam  Vitals reviewed.   Constitutional:        Appearance: He is ill-appearing.   HENT:      Mouth/Throat:      Mouth: Mucous membranes are moist.   Cardiovascular:      Rate and Rhythm: Regular rhythm.   Pulmonary:      Comments: ventilated  Abdominal:      General: Bowel sounds are normal.      Palpations: Abdomen is soft.   Musculoskeletal:      Cervical back: Neck supple.         Significant Labs:  BMP:   Recent Labs   Lab 01/12/22  1441   *      K 3.3*      CO2 23   BUN 74*   CREATININE 4.12*   CALCIUM 5.7*   MG 1.9     CBC:   Recent Labs   Lab 01/14/22  0758   WBC 19.80*   RBC 2.42*   HGB 7.4*   HCT 21.1*      MCV 87.2   MCH 30.6   MCHC 35.1        Significant Imaging:  Labs: Reviewed

## 2022-01-14 NOTE — ASSESSMENT & PLAN NOTE
- nephrology consulted   - no sign of renal recovery.  Continuing with scheduled hemodialysis for this patient.  - tunneled HD line placement 12/30  Continues with dialysis  1/13 nephrology note reviewed and appreciated. No renal recovery. Continue with HD per nephrology  1/14- Plan for HD today

## 2022-01-14 NOTE — ASSESSMENT & PLAN NOTE
- continue basal/bolus insulin  -accuchecks are reasonable currently  - he is on tpn and started on tube feeds. Will increase tube feeds. As he reaches goal we will wean TPN  - plan to start weaning TPN today

## 2022-01-14 NOTE — PROGRESS NOTES
[facility]  Nephrology  Progress Note    Patient Name: Og Garcia  MRN: [id number]  Admission Date: 12/23/2021  Hospital Length of Stay: 22 days  Attending Provider: Cecil Abernathy DO   Primary Care Physician: Hector Arndt DNP, FNP-C  Principal Problem:Denice gangrene    Subjective:     HPI: The patient is known from the previous nephrology consult at [facility].  He is no at Specialty and continues to need dialysis support.      Interval History: The patient is resting.  His situation continues to be the same.  No other changes.    Review of patient's allergies indicates:  No Known Allergies  Current Facility-Administered Medications   Medication Frequency    0.9%  NaCl infusion (for blood administration) Q24H PRN    0.9%  NaCl infusion PRN    0.9%  NaCl infusion PRN    acetaminophen tablet 500 mg Q6H PRN    albuterol nebulizer solution 2.5 mg Q4H PRN    amphotericin B liposome (AMBISOME) 450 mg in dextrose 5 % 450 mL IVPB Q24H    dextrose 50% injection 12.5 g PRN    fentaNYL 2500 mcg in D5W 250 mL infusion premix (titrating) (conc: 10 mcg/mL) PRN    glucagon (human recombinant) injection 1 mg PRN    heparin (porcine) injection 4,000 Units PRN    heparin (porcine) injection 5,000 Units Q8H    insulin aspart U-100 injection 1-10 Units Q6H    insulin detemir U-100 injection 20 Units QHS    meropenem (MERREM) 500 mg in sodium chloride 0.9 % 100 mL IVPB (MB+) Q24H    metoprolol injection 5 mg Q5 Min PRN    NORepinephrine 32 mg in dextrose 5 % 250 mL infusion PRN    pantoprazole suspension 40 mg Daily    sevelamer carbonate pwpk 0.8 g TID WM    ticagrelor tablet 90 mg BID       Objective:     Vital Signs (Most Recent):  Temp: 97.5 °F (36.4 °C) (01/14/22 1115)  Pulse: 86 (01/14/22 1345)  Resp: (!) 32 (01/14/22 1345)  BP: (!) 143/65 (01/14/22 1345)  SpO2: 100 % (01/14/22 1345)  O2 Device (Oxygen Therapy): ventilator (01/14/22 5395) Vital Signs (24h Range):  Temp:  [97.5  °F (36.4 °C)-99.2 °F (37.3 °C)] 97.5 °F (36.4 °C)  Pulse:  [] 86  Resp:  [10-42] 32  SpO2:  [94 %-100 %] 100 %  BP: ()/(39-90) 143/65     Weight: 97 kg (213 lb 13.5 oz) (01/14/22 0600)  Body mass index is 29.83 kg/m².  Body surface area is 2.2 meters squared.    I/O last 3 completed shifts:  In: 4036 [I.V.:250; Blood:406; NG/GT:2680; IV Piggyback:700]  Out: 1000 [Other:200; Stool:800]    Physical Exam  Vitals reviewed.   Constitutional:       Appearance: He is ill-appearing.   HENT:      Mouth/Throat:      Mouth: Mucous membranes are moist.   Cardiovascular:      Rate and Rhythm: Regular rhythm.   Pulmonary:      Comments: ventilated  Abdominal:      General: Bowel sounds are normal.      Palpations: Abdomen is soft.   Musculoskeletal:      Cervical back: Neck supple.         Significant Labs:  BMP:   Recent Labs   Lab 01/12/22  1441   *      K 3.3*      CO2 23   BUN 74*   CREATININE 4.12*   CALCIUM 5.7*   MG 1.9     CBC:   Recent Labs   Lab 01/14/22  0758   WBC 19.80*   RBC 2.42*   HGB 7.4*   HCT 21.1*      MCV 87.2   MCH 30.6   MCHC 35.1        Significant Imaging:  Labs: Reviewed    Assessment/Plan:     * Denice gangrene  Continue wound care  1/5/2022.  Continue with wound debridement  1/6/2022 Plans for further wound debridement  1/7/2022 Now s/p wound washout  1/10/2022 Continued would debridement  1/11/2021 Wound care management  1/14/2022  The patient's condition is the same.  Continuing with wound management    RAHAT (acute kidney injury)  Dialysis Monday   Continue with scheduled hemodialysis for this patient.  1/5/2022 Dialysis done today.  No other changes  1/6/2022 Continue with dialysis on tomorrow.  1/7/2022  Dialysis as scheduled today.  And dialysis on tomorrow.  1/10/2022 Dialysis as scheduled.  1/11/2022 No signs of renal recovery.  Continue with dialysis management  1/12/2022 Continue with scheduled hemodialysis for this patient.  1/13/2022 Dialysis scheduled  for tomorrow.  1/14/2022 Dialysis scheduled for Saturday        Thank you for your consult. I will follow-up with patient. Please contact us if you have any additional questions.    Edwin Pryor Jr, MD  Nephrology  [facility]

## 2022-01-14 NOTE — PROGRESS NOTES
[facility]  Adult Nutrition  Follow-up Note         Reason for Assessment  Reason For Assessment: RD follow-up  Nutrition Risk Screen: tube feeding or parenteral nutrition,large or nonhealing wound, burn or pressure injury  Malnutrition  Is Patient Malnourished: No  Nutrition Diagnosis  Inadequate oral intake   related to Decreased ability to consume sufficient energy as evidenced by pt on vent    Nutrition Diagnosis Status: Chronic/ continues      Nutrition Risk  Level of Risk/Frequency of Follow-up: high   Chewing or Swallowing Difficulty?: No Chewing or swallowing difficulty  Estimated/Assessed Needs  RMR (Green-St. Jeor Equation): 1772.13 Activity Factor: 1 Injury Factor: 1   Total Ve: 10 mL Temp: 98.8 °F (37.1 °C)Axillary  Weight Used For Calorie Calculations: 92.2 kg (203 lb 4.2 oz)   Energy Need Method: [place] (modified) Energy Calorie Requirements (kcal): 2065  Weight Used For Protein Calculations: 81 kg (178 lb 9.2 oz) (adjusted body wt)  Protein Requirements:   Estimated Fluid Requirement Method: RDA Method Fluid Requirements (mL): 2065  RDA Method (mL): 2065     Nutrition Prescription / Recommendations  Recommendation/Intervention: TF: Vital AF @ 75ml/hr; H20 flush of 60ml q2hr  Goals: pt will tolerate tube feeding at goal; pt will meet estimated nutritional needs  Nutrition Goal Status: progressing towards goal  Communication of RD Recs: reviewed with physician  Current Diet Order: TF: Vital AF @ 75ml/hr; H20 flush of 60ml q2hr  Nutrition Order Comments: TF: Vital AF @ 75ml/hr; H20 flush of 60ml q2hr  Current Nutrition Support Formula Ordered: Vital AF 1.2  Current Nutrition Support Rate Ordered: 75 (ml)  Current Nutrition Support Frequency Ordered: hourly  Recommended Diet: Enteral Nutrition TF: Vital AF @ 75ml/hr; H20 flush of 60ml q2hr  Recommended Oral Supplement: No Oral Supplements  Is Nutrition Support Recommended: No  Is Education Recommended: No  Monitor and  Evaluation  % current Intake: Enteral Nutrition progressing to goal  % intake to meet estimated needs: Enteral Nutrition   Food and Nutrient Intake: enteral nutrition intake  Food and Nutrient Adminstration: enteral and parenteral nutrition administration  Anthropometric Measurements: height/length,weight,weight change,body mass index  Biochemical Data, Medical Tests and Procedures: electrolyte and renal panel,glucose/endocrine profile  Nutrition-Focused Physical Findings: skin  Enteral Calories (kcal): 2160  Enteral Protein (gm): 135  Enteral (Free Water) Fluid (mL): 1458  Free Water Flush Fluid (mL): 720  Parenteral Calories (kcal): 845  Parenteral Protein (gm): 48  Parenteral Fluid (mL): 960  Lipid Calories (kcals): 500 kcals  Other Calories (kcal): 528 (propofol)  Total Calories (kcal): 2160  Total Calories (kcal/kg): 2612  % Kcal Needs: 100  Total Protein (gm): 135  % Protein Needs: 100  IV Fluid (mL): 1764  Total Fluid Intake (mL): 2178  Energy Calories Required: meeting needs  Protein Required: meeting needs  Fluid Required: meeting needs  Tolerance: tolerating  Current Medical Diagnosis and Past Medical History  Diagnosis: gastrointestinal disease,infection/sepsis  Past Medical History:   Diagnosis Date    Hyperlipidemia     Hypertension      Nutrition/Diet History  Spiritual, Cultural Beliefs, Yazidi Practices, Values that Affect Care: no  Food Allergies: NKFA  Factors Affecting Nutritional Intake: None identified at this time  Lab/Procedures/Meds  Recent Labs   Lab 01/12/22  1441      K 3.3*   BUN 74*   CREATININE 4.12*   *   CALCIUM 5.7*   ALBUMIN 0.6*      PHOS 4.0     Last A1c: No results found for: HGBA1C  Lab Results   Component Value Date    RBC 1.67 (L) 01/13/2022    HGB 5.0 (LL) 01/13/2022    HCT 14.5 (LL) 01/13/2022    MCV 86.8 01/13/2022    MCH 29.9 01/13/2022    MCHC 34.5 01/13/2022     Pertinent Labs Reviewed: reviewed  Pertinent Labs Comments: Hct 14.5, K 3.3, BUN 74,  "Creat 4.14, Glu 145. Alb .6  Pertinent Medications Reviewed: reviewed  Pertinent Medications Comments: heparin, insulin  Anthropometrics  Temp: 98.8 °F (37.1 °C)  Height Method: Stated  Height: 5' 11" (180.3 cm)  Height (inches): 71 in  Weight Method: Bed Scale  Weight: 97 kg (213 lb 13.5 oz)  Weight (lb): 213.85 lb  Ideal Body Weight (IBW), Male: 172 lb  % Ideal Body Weight, Male (lb): 115.36 %  BMI (Calculated): 29.8  BMI Grade: 25 - 29.9 - overweight     Nutrition by Nursing  Diet/Nutrition Received: tube feeding     Diet/Feeding Assistance: total feed  Diet/Feeding Tolerance: other (see comments) (tf/tpn)  Last Bowel Movement: 01/14/22       NG/OG Tube [place] sump 18 Fr. Left nostril-Feeding Type: continuous,by pump       NG/OG Tube [place] sump 18 Fr. Left nostril-Current Rate (mL/hr): 55 mL/hr       NG/OG Tube [place] sump 18 Fr. Left nostril-Goal Rate (mL/hr): 55 mL/hr       NG/OG Tube [place] sump 18 Fr. Left nostril-Formula Name: Vital  Nutrition Follow-Up  RD Follow-up?: Yes  Assessment and Plan  Assessment & plan notes cannot be loaded without a specified hospital service.     "

## 2022-01-14 NOTE — ASSESSMENT & PLAN NOTE
Continue wound care  1/5/2022.  Continue with wound debridement  1/6/2022 Plans for further wound debridement  1/7/2022 Now s/p wound washout  1/10/2022 Continued would debridement  1/11/2021 Wound care management  1/14/2022  The patient's condition is the same.  Continuing with wound management

## 2022-01-14 NOTE — ASSESSMENT & PLAN NOTE
Dialysis Monday   Continue with scheduled hemodialysis for this patient.  1/5/2022 Dialysis done today.  No other changes  1/6/2022 Continue with dialysis on tomorrow.  1/7/2022  Dialysis as scheduled today.  And dialysis on tomorrow.  1/10/2022 Dialysis as scheduled.  1/11/2022 No signs of renal recovery.  Continue with dialysis management  1/12/2022 Continue with scheduled hemodialysis for this patient.  1/13/2022 Dialysis scheduled for tomorrow.  1/14/2022 Dialysis scheduled for Saturday

## 2022-01-14 NOTE — ASSESSMENT & PLAN NOTE
- H/H down to 5.5/16 - pt had bleeding from his HD insertion site yesterday this has now resolved.   - will transfuse 2 units PRBCs on HD  1/2- 9.7/27.5 - repeat CBC in AM  1/4- Hgb is 7.2. Not sure if he will be transfused during surgery. IF not we will try to get  Him transfused with next HD  01/06/2021 transfuse 1 unit of packed red blood cells today  01/10/2021 needs a unit of blood  1/11- repeat H&H in am. Continue to transfuse as needed.  1/13- Hgb is 5. Transfusing today. Post transfusion H&H  1/14- CBC pending for today

## 2022-01-14 NOTE — NURSING
Pt begin 2nd unit of blood 2235 on 01/13/2022 and completed 01/14/2022 @ 0130. No s/s transfusion reaction. Pt tolerated blood transfusions

## 2022-01-15 NOTE — ASSESSMENT & PLAN NOTE
- nephrology consulted   - no sign of renal recovery.  Continuing with scheduled hemodialysis for this patient.  - tunneled HD line placement 12/30  Continues with dialysis  1/13 nephrology note reviewed and appreciated. No renal recovery. Continue with HD per nephrology  1/15- HD today

## 2022-01-15 NOTE — SUBJECTIVE & OBJECTIVE
Interval History: sedated    Objective:     Vital Signs (Most Recent):  Temp: 98 °F (36.7 °C) (01/15/22 1200)  Pulse: 110 (01/15/22 1415)  Resp: 16 (01/15/22 1415)  BP: 125/65 (01/15/22 1415)  SpO2: 100 % (01/15/22 1415) Vital Signs (24h Range):  Temp:  [98 °F (36.7 °C)-98.6 °F (37 °C)] 98 °F (36.7 °C)  Pulse:  [] 110  Resp:  [10-34] 16  SpO2:  [100 %] 100 %  BP: (108-157)/() 125/65     Weight: 97 kg (213 lb 13.5 oz)  Body mass index is 29.83 kg/m².      Intake/Output Summary (Last 24 hours) at 1/15/2022 1742  Last data filed at 1/15/2022 1132  Gross per 24 hour   Intake --   Output 2500 ml   Net -2500 ml       Physical Exam  Vitals reviewed.   Constitutional:       General: He is not in acute distress.     Appearance: He is ill-appearing.   HENT:      Head: Normocephalic and atraumatic.      Comments: Right temporal deformity     Right Ear: External ear normal.      Left Ear: External ear normal.      Nose: Nose normal.      Mouth/Throat:      Mouth: Mucous membranes are moist.      Comments: trach  Eyes:      Conjunctiva/sclera: Conjunctivae normal.      Pupils: Pupils are equal, round, and reactive to light.   Cardiovascular:      Rate and Rhythm: Regular rhythm. Tachycardia present.      Pulses: Normal pulses.      Heart sounds: Normal heart sounds.   Pulmonary:      Effort: No respiratory distress.      Comments: coarse breath sounds anteriorly  Abdominal:      Palpations: Abdomen is soft.   Musculoskeletal:         General: Swelling present.      Cervical back: Neck supple.      Right lower leg: Edema present.      Left lower leg: Edema present.   Lymphadenopathy:      Cervical: No cervical adenopathy.   Skin:     General: Skin is warm and dry.   Neurological:      Cranial Nerves: No cranial nerve deficit.      Comments: Remains on mechanical ventilation. Not responding to verbal commands         Vents:  Vent Mode: A/C (01/15/22 1700)  Set Rate: 16 BPM (01/15/22 1700)  Vt Set: 480 mL (01/15/22  1700)  Pressure Support: 12 cmH20 (01/15/22 1625)  PEEP/CPAP: 5 cmH20 (01/15/22 1700)  Oxygen Concentration (%): 40 (01/15/22 1210)  Peak Airway Pressure: 16 cmH2O (01/15/22 1700)  Total Ve: 4.2 mL (01/15/22 1700)  F/VT Ratio<105 (RSBI): (!) 57.14 (01/15/22 0845)    Lines/Drains/Airways     Peripherally Inserted Central Catheter Line            PICC Double Lumen 01/05/22 left basilic 10 days          Central Venous Catheter Line                 Hemodialysis Catheter 12/30/21 0900 right internal jugular 16 days          Drain                 NG/OG Tube [place] sump 18 Fr. Left nostril -- days         Colostomy 12/18/21 1030 Descending/sigmoid LUQ 28 days          Airway                 Surgical Airway 01/04/22 Shiley 11 days          Peripheral Intravenous Line                 Peripheral IV - Single Lumen 01/14/22 1835 18 G Anterior;Left;Proximal Forearm <1 day                Significant Labs:    CBC/Anemia Profile:  Recent Labs   Lab 01/14/22  0758 01/15/22  0825   WBC 19.80* 18.04*   HGB 7.4* 6.4*   HCT 21.1* 18.0*    166   MCV 87.2 85.3   RDW 15.0* 14.7*        Chemistries:  No results for input(s): NA, K, CL, CO2, BUN, CREATININE, CALCIUM, ALBUMIN, PROT, BILITOT, ALKPHOS, ALT, AST, GLUCOSE, MG, PHOS in the last 48 hours.    All pertinent labs within the past 24 hours have been reviewed.    Significant Imaging:  I have reviewed all pertinent imaging results/findings within the past 24 hours.

## 2022-01-15 NOTE — ASSESSMENT & PLAN NOTE
- H/H down to 5.5/16 - pt had bleeding from his HD insertion site yesterday this has now resolved.   - will transfuse 2 units PRBCs on HD  1/2- 9.7/27.5 - repeat CBC in AM  1/4- Hgb is 7.2. Not sure if he will be transfused during surgery. IF not we will try to get  Him transfused with next HD  01/06/2021 transfuse 1 unit of packed red blood cells today  01/10/2021 needs a unit of blood  1/11- repeat H&H in am. Continue to transfuse as needed.  1/13- Hgb is 5. Transfusing today. Post transfusion H&H  1/14- CBC pending for today  1/15- 6/18 - hemodynamically stable, recheck in AM

## 2022-01-15 NOTE — PROGRESS NOTES
[facility]  Pulmonology  Progress Note    Patient Name: Og Garcia  MRN: [id number]  Admission Date: 12/23/2021  Hospital Length of Stay: 23 days  Code Status: Prior  Attending Provider: Cecil Abernathy DO  Primary Care Provider: Hector Arndt DNP, FNP-C   Principal Problem: Denice gangrene    Subjective:     Interval History: sedated    Objective:     Vital Signs (Most Recent):  Temp: 98 °F (36.7 °C) (01/15/22 1200)  Pulse: 110 (01/15/22 1415)  Resp: 16 (01/15/22 1415)  BP: 125/65 (01/15/22 1415)  SpO2: 100 % (01/15/22 1415) Vital Signs (24h Range):  Temp:  [98 °F (36.7 °C)-98.6 °F (37 °C)] 98 °F (36.7 °C)  Pulse:  [] 110  Resp:  [10-34] 16  SpO2:  [100 %] 100 %  BP: (108-157)/() 125/65     Weight: 97 kg (213 lb 13.5 oz)  Body mass index is 29.83 kg/m².      Intake/Output Summary (Last 24 hours) at 1/15/2022 1742  Last data filed at 1/15/2022 1132  Gross per 24 hour   Intake --   Output 2500 ml   Net -2500 ml       Physical Exam  Vitals reviewed.   Constitutional:       General: He is not in acute distress.     Appearance: He is ill-appearing.   HENT:      Head: Normocephalic and atraumatic.      Comments: Right temporal deformity     Right Ear: External ear normal.      Left Ear: External ear normal.      Nose: Nose normal.      Mouth/Throat:      Mouth: Mucous membranes are moist.      Comments: trach  Eyes:      Conjunctiva/sclera: Conjunctivae normal.      Pupils: Pupils are equal, round, and reactive to light.   Cardiovascular:      Rate and Rhythm: Regular rhythm. Tachycardia present.      Pulses: Normal pulses.      Heart sounds: Normal heart sounds.   Pulmonary:      Effort: No respiratory distress.      Comments: coarse breath sounds anteriorly  Abdominal:      Palpations: Abdomen is soft.   Musculoskeletal:         General: Swelling present.      Cervical back: Neck supple.      Right lower leg: Edema present.      Left lower leg: Edema present.    Lymphadenopathy:      Cervical: No cervical adenopathy.   Skin:     General: Skin is warm and dry.   Neurological:      Cranial Nerves: No cranial nerve deficit.      Comments: Remains on mechanical ventilation. Not responding to verbal commands         Vents:  Vent Mode: A/C (01/15/22 1700)  Set Rate: 16 BPM (01/15/22 1700)  Vt Set: 480 mL (01/15/22 1700)  Pressure Support: 12 cmH20 (01/15/22 1625)  PEEP/CPAP: 5 cmH20 (01/15/22 1700)  Oxygen Concentration (%): 40 (01/15/22 1210)  Peak Airway Pressure: 16 cmH2O (01/15/22 1700)  Total Ve: 4.2 mL (01/15/22 1700)  F/VT Ratio<105 (RSBI): (!) 57.14 (01/15/22 0845)    Lines/Drains/Airways     Peripherally Inserted Central Catheter Line            PICC Double Lumen 01/05/22 left basilic 10 days          Central Venous Catheter Line                 Hemodialysis Catheter 12/30/21 0900 right internal jugular 16 days          Drain                 NG/OG Tube [place] sump 18 Fr. Left nostril -- days         Colostomy 12/18/21 1030 Descending/sigmoid LUQ 28 days          Airway                 Surgical Airway 01/04/22 Shiley 11 days          Peripheral Intravenous Line                 Peripheral IV - Single Lumen 01/14/22 1835 18 G Anterior;Left;Proximal Forearm <1 day                Significant Labs:    CBC/Anemia Profile:  Recent Labs   Lab 01/14/22  0758 01/15/22  0825   WBC 19.80* 18.04*   HGB 7.4* 6.4*   HCT 21.1* 18.0*    166   MCV 87.2 85.3   RDW 15.0* 14.7*        Chemistries:  No results for input(s): NA, K, CL, CO2, BUN, CREATININE, CALCIUM, ALBUMIN, PROT, BILITOT, ALKPHOS, ALT, AST, GLUCOSE, MG, PHOS in the last 48 hours.    All pertinent labs within the past 24 hours have been reviewed.    Significant Imaging:  I have reviewed all pertinent imaging results/findings within the past 24 hours.    Assessment/Plan:     * Denice gangrene  - s/p multiple debridements  - wound vac in place  - ID consulted for antibiotic management: He was treated with rocephin,  daptomycin, clindamycin. 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 Amphotericin started on 1/7. The patient went to OR on 1/10 for debridement. Surgery note reviewed and appreciated.  1/12- back to OR today.  1/13- OR note reviewed. No purulence noted.    Type 2 diabetes mellitus without complication, without long-term current use of insulin  - continue basal/bolus insulin  -accuchecks are reasonable currently      Stable     Acute blood loss anemia  - H/H down to 5.5/16 - pt had bleeding from his HD insertion site yesterday this has now resolved.   - will transfuse 2 units PRBCs on HD  1/2- 9.7/27.5 - repeat CBC in AM  1/4- Hgb is 7.2. Not sure if he will be transfused during surgery. IF not we will try to get  Him transfused with next HD  01/06/2021 transfuse 1 unit of packed red blood cells today  01/10/2021 needs a unit of blood  1/11- repeat H&H in am. Continue to transfuse as needed.  1/13- Hgb is 5. Transfusing today. Post transfusion H&H  1/14- CBC pending for today  1/15- 6/18 - hemodynamically stable, recheck in AM     RAHAT (acute kidney injury)  - nephrology consulted   - no sign of renal recovery.  Continuing with scheduled hemodialysis for this patient.  - tunneled HD line placement 12/30  Continues with dialysis  1/13 nephrology note reviewed and appreciated. No renal recovery. Continue with HD per nephrology  1/15- HD today        On mechanically assisted ventilation  - 12/14 intubated, tracheostomy 1/4  Currently very ill have not been able to wean  1/11- remains very sick. Not able to tolerate  any weaning trials. He is oxygenating adequately on 40%. Prognosis is poor.  1/13- no significant change. He is critically ill and not able to tolerate weaning trials currently   If I can get him off of pressors I will try some SIMV with reduced rate.  1/14- placed him on PSV while I was in the room and he did well. We will try 1 hour bid today  1/15-- not doing well on PSV         Will start to wean fentanyl infusion today and see how he responds           FLORESITA Shafer-TAMIP  Pulmonology  [facility]

## 2022-01-15 NOTE — PLAN OF CARE
Problem: Adult Inpatient Plan of Care  Goal: Plan of Care Review  Outcome: Ongoing, Progressing  Goal: Patient-Specific Goal (Individualized)  Outcome: Ongoing, Progressing  Goal: Absence of Hospital-Acquired Illness or Injury  Outcome: Ongoing, Progressing

## 2022-01-15 NOTE — ASSESSMENT & PLAN NOTE
- 12/14 intubated, tracheostomy 1/4  Currently very ill have not been able to wean  1/11- remains very sick. Not able to tolerate any weaning trials. He is oxygenating adequately on 40%. Prognosis is poor.  1/13- no significant change. He is critically ill and not able to tolerate weaning trials currently   If I can get him off of pressors I will try some SIMV with reduced rate.  1/14- placed him on PSV while I was in the room and he did well. We will try 1 hour bid today  1/15-- not doing well on PSV

## 2022-01-15 NOTE — RESPIRATORY THERAPY
Patient was stable through the night. Patient did breathing trial from 0400 to 0500, volumes and respiratory rate maintained at a good rate but patient did occasionally did have apenic episodes.

## 2022-01-15 NOTE — DIALYSIS ROUNDING
"          Nephrology Department            NAME: Og Wolf Garcia   YOB: 1955  MRN: [id number]  NOTE DATE: 01/15/2022       Seen on dialysis in the ICU setting.  He is tolerating the procedure.    Patient complains:  None.      REVIEW OF SYSTEMS:  he reports no shortness of breath, nausea or vomiting. No acute changes.    VITALS:  height is 5' 11" (1.803 m) and weight is 97 kg (213 lb 13.5 oz). His temperature is 98 °F (36.7 °C). His blood pressure is 146/67 (abnormal, pended) and his pulse is 91 (pended). His respiration is 20 (pended) and oxygen saturation is 100%.  Hemodialysis documentation flowsheet reviewed with dialysis nurse, including weight and vitals.    Resting comfortably, critically ill  Ventilated. Lungs clear.  Heart regular, no rub.  Abdomen soft, nontender, positive bowl sounds  No edema.    Recent Labs   Lab 01/09/22  1100 01/10/22  0539 01/12/22  1441   * 131*  131* 136   K 3.5 3.8  3.7 3.3*   CL 99 101  98 103   CO2 24 19*  20* 23   BUN 48* 65*  65* 74*   CREATININE 4.24* 4.68*  5.11* 4.12*   CALCIUM 7.1* 7.9*  7.4* 5.7*   PHOS 5.2* 4.6* 4.0     Recent Labs   Lab 01/13/22  0432 01/14/22  0758 01/15/22  0825   WBC 23.21* 19.80* 18.04*   HGB 5.0* 7.4* 6.4*   HCT 14.5* 21.1* 18.0*    177 166       ASSESSMENT/PLAN:  Continue with scheduled hemodialysis for this patient.    Edwin Pryor Jr, MD  "

## 2022-01-16 PROBLEM — R57.9 SHOCK: Status: RESOLVED | Noted: 2022-01-01 | Resolved: 2022-01-01

## 2022-01-16 NOTE — PROGRESS NOTES
[facility]  Pulmonology  Progress Note    Patient Name: Og Garcia  MRN: [id number]  Admission Date: 12/23/2021  Hospital Length of Stay: 24 days  Code Status: Prior  Attending Provider: Cecil Abernathy DO  Primary Care Provider: Hector Arndt DNP, FNP-C   Principal Problem: Denice gangrene    Subjective:     Interval History: mckay to pain when fentanyl infusion is decreased.        Objective:     Vital Signs (Most Recent):  Temp: 99.9 °F (37.7 °C) (01/16/22 1500)  Pulse: 104 (01/16/22 1645)  Resp: (!) 27 (01/16/22 1645)  BP: (!) 115/57 (01/16/22 1645)  SpO2: 100 % (01/16/22 1645) Vital Signs (24h Range):  Temp:  [98.2 °F (36.8 °C)-101.5 °F (38.6 °C)] 99.9 °F (37.7 °C)  Pulse:  [] 104  Resp:  [7-36] 27  SpO2:  [100 %] 100 %  BP: ()/(44-63) 115/57     Weight: 97 kg (213 lb 13.5 oz)  Body mass index is 29.83 kg/m².    No intake or output data in the 24 hours ending 01/16/22 1702    Physical Exam  Vitals reviewed.   Constitutional:       General: He is not in acute distress.     Appearance: He is ill-appearing.   HENT:      Head: Normocephalic and atraumatic.      Comments: Right temporal deformity     Right Ear: External ear normal.      Left Ear: External ear normal.      Nose: Nose normal.      Mouth/Throat:      Mouth: Mucous membranes are moist.      Comments: trach  Eyes:      Conjunctiva/sclera: Conjunctivae normal.      Pupils: Pupils are equal, round, and reactive to light.   Cardiovascular:      Rate and Rhythm: Regular rhythm. Tachycardia present.      Pulses: Normal pulses.      Heart sounds: Normal heart sounds.   Pulmonary:      Effort: No respiratory distress.      Comments: coarse breath sounds anteriorly  Abdominal:      Palpations: Abdomen is soft.   Musculoskeletal:         General: Swelling present.      Cervical back: Neck supple.      Right lower leg: Edema present.      Left lower leg: Edema present.   Lymphadenopathy:      Cervical: No cervical  adenopathy.   Skin:     General: Skin is warm and dry.   Neurological:      Cranial Nerves: No cranial nerve deficit.      Comments: Remains on mechanical ventilation. Not responding to verbal commands         Vents:  Vent Mode: A/C (01/16/22 1155)  Set Rate: 16 BPM (01/16/22 1155)  Vt Set: 480 mL (01/16/22 1155)  Pressure Support: 12 cmH20 (01/16/22 0810)  PEEP/CPAP: 5 cmH20 (01/16/22 1155)  Oxygen Concentration (%): 40 (01/16/22 0800)  Peak Airway Pressure: 15 cmH2O (01/16/22 1155)  Total Ve: 4.6 mL (01/16/22 1155)  F/VT Ratio<105 (RSBI): (!) 50.1 (01/16/22 0348)    Lines/Drains/Airways     Peripherally Inserted Central Catheter Line            PICC Double Lumen 01/05/22 left basilic 11 days          Central Venous Catheter Line                 Hemodialysis Catheter 12/30/21 0900 right internal jugular 17 days          Drain                 NG/OG Tube [place] sump 18 Fr. Left nostril -- days         Colostomy 12/18/21 1030 Descending/sigmoid LUQ 29 days          Airway                 Surgical Airway 01/04/22 Shiley 12 days          Peripheral Intravenous Line                 Peripheral IV - Single Lumen 01/14/22 1835 18 G Anterior;Left;Proximal Forearm 1 day                Significant Labs:    CBC/Anemia Profile:  Recent Labs   Lab 01/15/22  0825 01/16/22  0745   WBC 18.04*  --    HGB 6.4* 6.1*   HCT 18.0* 17.1*     --    MCV 85.3  --    RDW 14.7*  --         Chemistries:  No results for input(s): NA, K, CL, CO2, BUN, CREATININE, CALCIUM, ALBUMIN, PROT, BILITOT, ALKPHOS, ALT, AST, GLUCOSE, MG, PHOS in the last 48 hours.    All pertinent labs within the past 24 hours have been reviewed.    Significant Imaging:  I have reviewed all pertinent imaging results/findings within the past 24 hours.    Assessment/Plan:     * Denice gangrene  - s/p multiple debridements  - wound vac in place  - ID consulted for antibiotic management: He was treated with rocephin, daptomycin, clindamycin. 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 Amphotericin started on 1/7. The patient went to OR on 1/10 for debridement. Surgery note reviewed and appreciated.  1/12- back to OR today.  1/13- OR note reviewed. No purulence noted.    1/16- tmax 101.5 -- repeat BC     Type 2 diabetes mellitus without complication, without long-term current use of insulin  - continue basal/bolus insulin  -accuchecks are reasonable currently      Stable     Acute blood loss anemia  - H/H down to 5.5/16 - pt had bleeding from his HD insertion site yesterday this has now resolved.   - will transfuse 2 units PRBCs on HD  1/2- 9.7/27.5 - repeat CBC in AM  1/4- Hgb is 7.2. Not sure if he will be transfused during surgery. IF not we will try to get  Him transfused with next HD  01/06/2021 transfuse 1 unit of packed red blood cells today  01/10/2021 needs a unit of blood  1/11- repeat H&H in am. Continue to transfuse as needed.  1/13- Hgb is 5. Transfusing today. Post transfusion H&H  1/14- CBC pending for today  1/15- 6/18 - hemodynamically stable, recheck in AM   1/16-- 6.1/17.1 - critical low blood shortage in hospital - holding transfusion until next HD since he is hemodynamically stable and not actively bleeding     RAHAT (acute kidney injury)  - nephrology consulted   - no sign of renal recovery.  Continuing with scheduled hemodialysis for this patient.  - tunneled HD line placement 12/30  Continues with dialysis  1/13 nephrology note reviewed and appreciated. No renal recovery. Continue with HD per nephrology    1/16-- continue HD         On mechanically assisted  ventilation  - 12/14 intubated, tracheostomy 1/4  Currently very ill have not been able to wean  1/11- remains very sick. Not able to tolerate any weaning trials. He is oxygenating adequately on 40%. Prognosis is poor.  1/13- no significant change. He is critically ill and not able to tolerate weaning trials currently   If I can get him off of pressors I will try some SIMV with reduced rate.  1/14- placed him on PSV while I was in the room and he did well. We will try 1 hour bid today  1/16- not doing well on PSV                    FLORESITA [facility]  Pulmonology  [facility]

## 2022-01-16 NOTE — ASSESSMENT & PLAN NOTE
- 12/14 intubated, tracheostomy 1/4  Currently very ill have not been able to wean  1/11- remains very sick. Not able to tolerate any weaning trials. He is oxygenating adequately on 40%. Prognosis is poor.  1/13- no significant change. He is critically ill and not able to tolerate weaning trials currently   If I can get him off of pressors I will try some SIMV with reduced rate.  1/14- placed him on PSV while I was in the room and he did well. We will try 1 hour bid today  1/16- not doing well on PSV

## 2022-01-16 NOTE — ASSESSMENT & PLAN NOTE
- nephrology consulted   - no sign of renal recovery.  Continuing with scheduled hemodialysis for this patient.  - tunneled HD line placement 12/30  Continues with dialysis  1/13 nephrology note reviewed and appreciated. No renal recovery. Continue with HD per nephrology    1/16-- continue HD

## 2022-01-16 NOTE — SUBJECTIVE & OBJECTIVE
Interval History: The patient tolerated dialysis on yesterday.  Today, hgb is 6.1.    Review of patient's allergies indicates:  No Known Allergies  Current Facility-Administered Medications   Medication Frequency    0.9%  NaCl infusion (for blood administration) Q24H PRN    0.9%  NaCl infusion PRN    0.9%  NaCl infusion PRN    acetaminophen tablet 1,000 mg Q6H PRN    acetaminophen tablet 500 mg Q6H PRN    albuterol nebulizer solution 2.5 mg Q4H PRN    amphotericin B liposome (AMBISOME) 450 mg in dextrose 5 % 450 mL IVPB Q24H    bisacodyL EC tablet 10 mg Daily PRN    dextromethorphan-guaiFENesin  mg/5 ml liquid 10 mL Q6H PRN    dextrose 50% injection 12.5 g PRN    fentaNYL 2500 mcg in D5W 250 mL infusion premix (titrating) (conc: 10 mcg/mL) PRN    glucagon (human recombinant) injection 1 mg PRN    heparin (porcine) injection 4,000 Units PRN    heparin (porcine) injection 5,000 Units Q8H    insulin aspart U-100 injection 1-10 Units Q6H    insulin detemir U-100 injection 20 Units QHS    metoprolol injection 5 mg Q5 Min PRN    NORepinephrine 32 mg in dextrose 5 % 250 mL infusion PRN    ondansetron injection 8 mg Q6H PRN    pantoprazole suspension 40 mg Daily    sevelamer carbonate pwpk 0.8 g TID WM    simethicone chewable tablet 80 mg TID PRN    ticagrelor tablet 90 mg BID    trazodone split tablet 50 mg Nightly PRN       Objective:     Vital Signs (Most Recent):  Temp: (!) 101.5 °F (38.6 °C) (01/16/22 0544)  Pulse: 100 (01/16/22 0348)  Resp: (!) 26 (01/16/22 0348)  BP: (!) 105/51 (01/16/22 0348)  SpO2: 100 % (01/16/22 0755)  O2 Device (Oxygen Therapy): ventilator (01/16/22 0755) Vital Signs (24h Range):  Temp:  [98 °F (36.7 °C)-101.5 °F (38.6 °C)] 101.5 °F (38.6 °C)  Pulse:  [] 100  Resp:  [10-37] 26  SpO2:  [100 %] 100 %  BP: (105-152)/(44-76) 105/51     Weight: 97 kg (213 lb 13.5 oz) (01/14/22 0600)  Body mass index is 29.83 kg/m².  Body surface area is 2.2 meters squared.    I/O  last 3 completed shifts:  In: -   Out: 2500 [Other:2500]    Physical Exam  Vitals reviewed.   Constitutional:       Appearance: He is ill-appearing.   Cardiovascular:      Rate and Rhythm: Regular rhythm.      Heart sounds: Normal heart sounds.   Pulmonary:      Comments: The patient is ventilated  Abdominal:      General: Bowel sounds are normal.      Palpations: Abdomen is soft.         Significant Labs:  BMP:   Recent Labs   Lab 01/12/22  1441   *      K 3.3*      CO2 23   BUN 74*   CREATININE 4.12*   CALCIUM 5.7*   MG 1.9     CBC:   Recent Labs   Lab 01/15/22  0825 01/15/22  0825 01/16/22  0745   WBC 18.04*  --   --    RBC 2.11*  --   --    HGB 6.4*   < > 6.1*   HCT 18.0*   < > 17.1*     --   --    MCV 85.3  --   --    MCH 30.3  --   --    MCHC 35.6  --   --     < > = values in this interval not displayed.        Significant Imaging:  Labs: Reviewed

## 2022-01-16 NOTE — ASSESSMENT & PLAN NOTE
Continue wound care  1/5/2022.  Continue with wound debridement  1/6/2022 Plans for further wound debridement  1/7/2022 Now s/p wound washout  1/10/2022 Continued would debridement  1/11/2021 Wound care management  1/14/2022  The patient's condition is the same.  Continuing with wound management  1/16/2022 Continue with wound management.

## 2022-01-16 NOTE — ASSESSMENT & PLAN NOTE
Dialysis Monday   Continue with scheduled hemodialysis for this patient.  1/5/2022 Dialysis done today.  No other changes  1/6/2022 Continue with dialysis on tomorrow.  1/7/2022  Dialysis as scheduled today.  And dialysis on tomorrow.  1/10/2022 Dialysis as scheduled.  1/11/2022 No signs of renal recovery.  Continue with dialysis management  1/12/2022 Continue with scheduled hemodialysis for this patient.  1/13/2022 Dialysis scheduled for tomorrow.  1/14/2022 Dialysis scheduled for Saturday 1/16/2022 Tolerated dialysis on Saturday.  Condition remains critical

## 2022-01-16 NOTE — ASSESSMENT & PLAN NOTE
- s/p multiple debridements  - wound vac in place  - ID consulted for antibiotic management: He was treated with rocephin, daptomycin, clindamycin. 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 Amphotericin started on 1/7. The patient went to OR on 1/10 for debridement. Surgery note reviewed and appreciated.  1/12- back to OR today.  1/13- OR note reviewed. No purulence noted.    1/16- tmax 101.5 -- repeat BC

## 2022-01-16 NOTE — SUBJECTIVE & OBJECTIVE
Interval History: mckay to pain when fentanyl infusion is decreased.        Objective:     Vital Signs (Most Recent):  Temp: 99.9 °F (37.7 °C) (01/16/22 1500)  Pulse: 104 (01/16/22 1645)  Resp: (!) 27 (01/16/22 1645)  BP: (!) 115/57 (01/16/22 1645)  SpO2: 100 % (01/16/22 1645) Vital Signs (24h Range):  Temp:  [98.2 °F (36.8 °C)-101.5 °F (38.6 °C)] 99.9 °F (37.7 °C)  Pulse:  [] 104  Resp:  [7-36] 27  SpO2:  [100 %] 100 %  BP: ()/(44-63) 115/57     Weight: 97 kg (213 lb 13.5 oz)  Body mass index is 29.83 kg/m².    No intake or output data in the 24 hours ending 01/16/22 1702    Physical Exam  Vitals reviewed.   Constitutional:       General: He is not in acute distress.     Appearance: He is ill-appearing.   HENT:      Head: Normocephalic and atraumatic.      Comments: Right temporal deformity     Right Ear: External ear normal.      Left Ear: External ear normal.      Nose: Nose normal.      Mouth/Throat:      Mouth: Mucous membranes are moist.      Comments: trach  Eyes:      Conjunctiva/sclera: Conjunctivae normal.      Pupils: Pupils are equal, round, and reactive to light.   Cardiovascular:      Rate and Rhythm: Regular rhythm. Tachycardia present.      Pulses: Normal pulses.      Heart sounds: Normal heart sounds.   Pulmonary:      Effort: No respiratory distress.      Comments: coarse breath sounds anteriorly  Abdominal:      Palpations: Abdomen is soft.   Musculoskeletal:         General: Swelling present.      Cervical back: Neck supple.      Right lower leg: Edema present.      Left lower leg: Edema present.   Lymphadenopathy:      Cervical: No cervical adenopathy.   Skin:     General: Skin is warm and dry.   Neurological:      Cranial Nerves: No cranial nerve deficit.      Comments: Remains on mechanical ventilation. Not responding to verbal commands         Vents:  Vent Mode: A/C (01/16/22 1155)  Set Rate: 16 BPM (01/16/22 1155)  Vt Set: 480 mL (01/16/22 1155)  Pressure Support: 12 cmH20  (01/16/22 0810)  PEEP/CPAP: 5 cmH20 (01/16/22 1155)  Oxygen Concentration (%): 40 (01/16/22 0800)  Peak Airway Pressure: 15 cmH2O (01/16/22 1155)  Total Ve: 4.6 mL (01/16/22 1155)  F/VT Ratio<105 (RSBI): (!) 50.1 (01/16/22 0348)    Lines/Drains/Airways     Peripherally Inserted Central Catheter Line            PICC Double Lumen 01/05/22 left basilic 11 days          Central Venous Catheter Line                 Hemodialysis Catheter 12/30/21 0900 right internal jugular 17 days          Drain                 NG/OG Tube [place] sump 18 Fr. Left nostril -- days         Colostomy 12/18/21 1030 Descending/sigmoid LUQ 29 days          Airway                 Surgical Airway 01/04/22 Shiley 12 days          Peripheral Intravenous Line                 Peripheral IV - Single Lumen 01/14/22 1835 18 G Anterior;Left;Proximal Forearm 1 day                Significant Labs:    CBC/Anemia Profile:  Recent Labs   Lab 01/15/22  0825 01/16/22  0745   WBC 18.04*  --    HGB 6.4* 6.1*   HCT 18.0* 17.1*     --    MCV 85.3  --    RDW 14.7*  --         Chemistries:  No results for input(s): NA, K, CL, CO2, BUN, CREATININE, CALCIUM, ALBUMIN, PROT, BILITOT, ALKPHOS, ALT, AST, GLUCOSE, MG, PHOS in the last 48 hours.    All pertinent labs within the past 24 hours have been reviewed.    Significant Imaging:  I have reviewed all pertinent imaging results/findings within the past 24 hours.

## 2022-01-16 NOTE — RESPIRATORY THERAPY
Patient was put on trial @ 0345 patient did have episode of apneic period but patient was stable through trial.

## 2022-01-16 NOTE — ASSESSMENT & PLAN NOTE
- H/H down to 5.5/16 - pt had bleeding from his HD insertion site yesterday this has now resolved.   - will transfuse 2 units PRBCs on HD  1/2- 9.7/27.5 - repeat CBC in AM  1/4- Hgb is 7.2. Not sure if he will be transfused during surgery. IF not we will try to get  Him transfused with next HD  01/06/2021 transfuse 1 unit of packed red blood cells today  01/10/2021 needs a unit of blood  1/11- repeat H&H in am. Continue to transfuse as needed.  1/13- Hgb is 5. Transfusing today. Post transfusion H&H  1/14- CBC pending for today  1/15- 6/18 - hemodynamically stable, recheck in AM   1/16-- 6.1/17.1 - critical low blood shortage in hospital - holding transfusion until next HD since he is hemodynamically stable and not actively bleeding

## 2022-01-16 NOTE — PROGRESS NOTES
[facility]  Nephrology  Progress Note    Patient Name: Og Garcia  MRN: [id number]  Admission Date: 12/23/2021  Hospital Length of Stay: 24 days  Attending Provider: Cecil Abernathy DO   Primary Care Physician: Hector Arndt DNP, FNP-C  Principal Problem:Denice gangrene    Subjective:     HPI: The patient is known from the previous nephrology consult at [facility].  He is no at Specialty and continues to need dialysis support.      Interval History: The patient tolerated dialysis on yesterday.  Today, hgb is 6.1.    Review of patient's allergies indicates:  No Known Allergies  Current Facility-Administered Medications   Medication Frequency    0.9%  NaCl infusion (for blood administration) Q24H PRN    0.9%  NaCl infusion PRN    0.9%  NaCl infusion PRN    acetaminophen tablet 1,000 mg Q6H PRN    acetaminophen tablet 500 mg Q6H PRN    albuterol nebulizer solution 2.5 mg Q4H PRN    amphotericin B liposome (AMBISOME) 450 mg in dextrose 5 % 450 mL IVPB Q24H    bisacodyL EC tablet 10 mg Daily PRN    dextromethorphan-guaiFENesin  mg/5 ml liquid 10 mL Q6H PRN    dextrose 50% injection 12.5 g PRN    fentaNYL 2500 mcg in D5W 250 mL infusion premix (titrating) (conc: 10 mcg/mL) PRN    glucagon (human recombinant) injection 1 mg PRN    heparin (porcine) injection 4,000 Units PRN    heparin (porcine) injection 5,000 Units Q8H    insulin aspart U-100 injection 1-10 Units Q6H    insulin detemir U-100 injection 20 Units QHS    metoprolol injection 5 mg Q5 Min PRN    NORepinephrine 32 mg in dextrose 5 % 250 mL infusion PRN    ondansetron injection 8 mg Q6H PRN    pantoprazole suspension 40 mg Daily    sevelamer carbonate pwpk 0.8 g TID WM    simethicone chewable tablet 80 mg TID PRN    ticagrelor tablet 90 mg BID    trazodone split tablet 50 mg Nightly PRN       Objective:     Vital Signs (Most Recent):  Temp: (!) 101.5 °F (38.6 °C) (01/16/22 0544)  Pulse: 100 (01/16/22  0348)  Resp: (!) 26 (01/16/22 0348)  BP: (!) 105/51 (01/16/22 0348)  SpO2: 100 % (01/16/22 0755)  O2 Device (Oxygen Therapy): ventilator (01/16/22 0755) Vital Signs (24h Range):  Temp:  [98 °F (36.7 °C)-101.5 °F (38.6 °C)] 101.5 °F (38.6 °C)  Pulse:  [] 100  Resp:  [10-37] 26  SpO2:  [100 %] 100 %  BP: (105-152)/(44-76) 105/51     Weight: 97 kg (213 lb 13.5 oz) (01/14/22 0600)  Body mass index is 29.83 kg/m².  Body surface area is 2.2 meters squared.    I/O last 3 completed shifts:  In: -   Out: 2500 [Other:2500]    Physical Exam  Vitals reviewed.   Constitutional:       Appearance: He is ill-appearing.   Cardiovascular:      Rate and Rhythm: Regular rhythm.      Heart sounds: Normal heart sounds.   Pulmonary:      Comments: The patient is ventilated  Abdominal:      General: Bowel sounds are normal.      Palpations: Abdomen is soft.         Significant Labs:  BMP:   Recent Labs   Lab 01/12/22  1441   *      K 3.3*      CO2 23   BUN 74*   CREATININE 4.12*   CALCIUM 5.7*   MG 1.9     CBC:   Recent Labs   Lab 01/15/22  0825 01/15/22  0825 01/16/22  0745   WBC 18.04*  --   --    RBC 2.11*  --   --    HGB 6.4*   < > 6.1*   HCT 18.0*   < > 17.1*     --   --    MCV 85.3  --   --    MCH 30.3  --   --    MCHC 35.6  --   --     < > = values in this interval not displayed.        Significant Imaging:  Labs: Reviewed    Assessment/Plan:     * Denice gangrene  Continue wound care  1/5/2022.  Continue with wound debridement  1/6/2022 Plans for further wound debridement  1/7/2022 Now s/p wound washout  1/10/2022 Continued would debridement  1/11/2021 Wound care management  1/14/2022  The patient's condition is the same.  Continuing with wound management  1/16/2022 Continue with wound management.    RAHAT (acute kidney injury)  Dialysis Monday   Continue with scheduled hemodialysis for this patient.  1/5/2022 Dialysis done today.  No other changes  1/6/2022 Continue with dialysis on  tomorrow.  1/7/2022  Dialysis as scheduled today.  And dialysis on tomorrow.  1/10/2022 Dialysis as scheduled.  1/11/2022 No signs of renal recovery.  Continue with dialysis management  1/12/2022 Continue with scheduled hemodialysis for this patient.  1/13/2022 Dialysis scheduled for tomorrow.  1/14/2022 Dialysis scheduled for Saturday 1/16/2022 Tolerated dialysis on Saturday.  Condition remains critical        Thank you for your consult. I will follow-up with patient. Please contact us if you have any additional questions.    Edwin Pryor Jr, MD  Nephrology  [facility]

## 2022-01-17 PROBLEM — B46.4: Status: ACTIVE | Noted: 2022-01-01

## 2022-01-17 NOTE — SUBJECTIVE & OBJECTIVE
Interval History:  Unresponsive    Objective:     Vital Signs (Most Recent):  Temp: 99.2 °F (37.3 °C) (01/17/22 0800)  Pulse: 107 (01/17/22 0800)  Resp: 18 (01/17/22 0800)  BP: (!) 112/55 (01/17/22 0800)  SpO2: 100 % (01/17/22 0800) Vital Signs (24h Range):  Temp:  [98.6 °F (37 °C)-101.5 °F (38.6 °C)] 99.2 °F (37.3 °C)  Pulse:  [] 107  Resp:  [16-46] 18  SpO2:  [94 %-100 %] 100 %  BP: ()/(40-77) 112/55     Weight: 97 kg (213 lb 13.5 oz)  Body mass index is 29.83 kg/m².      Intake/Output Summary (Last 24 hours) at 1/17/2022 0845  Last data filed at 1/16/2022 1731  Gross per 24 hour   Intake 250 ml   Output 500 ml   Net -250 ml       Physical Exam  Vitals reviewed.   Constitutional:       Appearance: Normal appearance. He is ill-appearing.      Interventions: He is not intubated.     Comments: Unresponsive   HENT:      Head: Normocephalic and atraumatic.      Nose: Nose normal.      Mouth/Throat:      Mouth: Mucous membranes are dry.      Pharynx: Oropharynx is clear.   Eyes:      Extraocular Movements: Extraocular movements intact.      Conjunctiva/sclera: Conjunctivae normal.      Pupils: Pupils are equal, round, and reactive to light.   Cardiovascular:      Rate and Rhythm: Normal rate.      Heart sounds: Normal heart sounds. No murmur heard.      Pulmonary:      Effort: Pulmonary effort is normal. He is not intubated.      Breath sounds: Normal breath sounds.   Abdominal:      General: Abdomen is flat. Bowel sounds are normal.      Palpations: Abdomen is soft.   Musculoskeletal:         General: Normal range of motion.      Cervical back: Normal range of motion and neck supple.      Right lower leg: No edema.      Left lower leg: No edema.   Skin:     General: Skin is warm and dry.      Capillary Refill: Capillary refill takes less than 2 seconds.   Neurological:      General: No focal deficit present.      Mental Status: He is alert and oriented to person, place, and time.   Psychiatric:         Mood  and Affect: Mood normal.         Behavior: Behavior normal.         Vents:  Vent Mode: A/C (01/17/22 0507)  Set Rate: 16 BPM (01/17/22 0507)  Vt Set: 480 mL (01/17/22 0507)  Pressure Support: 12 cmH20 (01/16/22 0810)  PEEP/CPAP: 5 cmH20 (01/17/22 0507)  Oxygen Concentration (%): 40 (01/16/22 1700)  Peak Airway Pressure: 21 cmH2O (01/17/22 0507)  Total Ve: 16.6 mL (01/17/22 0507)  F/VT Ratio<105 (RSBI): (!) 50.1 (01/16/22 0348)    Lines/Drains/Airways     Peripherally Inserted Central Catheter Line            PICC Double Lumen 01/05/22 left basilic 12 days          Central Venous Catheter Line                 Hemodialysis Catheter 12/30/21 0900 right internal jugular 17 days          Drain                 NG/OG Tube [place] sump 18 Fr. Left nostril -- days         Colostomy 12/18/21 1030 Descending/sigmoid LUQ 29 days          Airway                 Surgical Airway 01/04/22 Shiley 13 days          Peripheral Intravenous Line                 Peripheral IV - Single Lumen 01/14/22 1835 18 G Anterior;Left;Proximal Forearm 2 days                Significant Labs:    CBC/Anemia Profile:  Recent Labs   Lab 01/16/22  0745   HGB 6.1*   HCT 17.1*        Chemistries:  No results for input(s): NA, K, CL, CO2, BUN, CREATININE, CALCIUM, ALBUMIN, PROT, BILITOT, ALKPHOS, ALT, AST, GLUCOSE, MG, PHOS in the last 48 hours.    All pertinent labs within the past 24 hours have been reviewed.    Significant Imaging:  I have reviewed all pertinent imaging results/findings within the past 24 hours.

## 2022-01-17 NOTE — CONSULTS
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  He has required multiple debridements.  Transferred to [facility] on 12/23 for continued care.  He was never able to be weaned off the vent so now has tracheostomy.  Illness complicated by acute renal failure he has been on hemodialysis since up in [facility].  Patient has had multiple debridements last being about 10 days ago.  There is report of mucor seen on histopathological evaluation of surgical tissue specimen from bowel resection on 01/06.  He has been on liposomal amphotericin B since that date and defervesced after about 4 days of amphotericin.  He was on Unasyn since admission and that was escalated on the 8th of January to meropenem which was discontinued on the 15th.  Patient was afebrile for about a week but 2 days ago started having fever to more than 101, T-max 101.5° last night but so far today no fever.  He has not required vasopressors for some time.  He has had labile pulse rate with decreases to 20 he is but then it spontaneously reverts.  He had transient asystole a couple weeks ago but none since.  Only antimicrobial at the moment is liposomal amphotericin B and I am asked to assist with management.    ROS:      Unable to obtain as patient is sedated on the vent    PMHx:      Past Medical History:   Diagnosis Date    Hyperlipidemia     Hypertension         PMSx:      Past Surgical History:   Procedure Laterality Date    COLOSTOMY N/A 12/18/2021    Procedure: CREATION, COLOSTOMY;  Surgeon: Veronica Hui MD;  Location: [facility];  Service: General;  Laterality: N/A;    DEBRIDEMENT OF LOWER EXTREMITY Right 1/4/2022    Procedure: DEBRIDEMENT, LOWER EXTREMITY;  Surgeon: Harish Cazares MD;   Location: [facility] OR;  Service: General;  Laterality: Right;    INCISION AND DRAINAGE OF ABSCESS N/A 12/13/2021    Procedure: INCISION AND DRAINAGE, ABSCESS PERINEAL REGION;  Surgeon: Harish Cazares MD;  Location: [facility] OR;  Service: General;  Laterality: N/A;  perirectal    LAPAROTOMY N/A 12/30/2021    Procedure: LAPAROTOMY, PELVIC WASHOUT;  Surgeon: Veronica Hui MD;  Location: [facility] OR;  Service: General;  Laterality: N/A;    TRACHEOSTOMY N/A 1/4/2022    Procedure: CREATION, TRACHEOSTOMY;  Surgeon: Harish Cazares MD;  Location: [facility] OR;  Service: General;  Laterality: N/A;        Social Hx:      Social History     Socioeconomic History    Marital status:    Tobacco Use    Smoking status: Current Every Day Smoker     Packs/day: 0.50     Types: Cigarettes    Smokeless tobacco: Never Used   Substance and Sexual Activity    Alcohol use: Not Currently    Drug use: Never    Sexual activity: Yes        Family Hx:      Family History   Problem Relation Age of Onset    Hypertension Mother     Hypertension Father     Diabetes Father     Cancer Father     `  Review of patient's allergies indicates:  No Known Allergies    Medications:      Current Facility-Administered Medications   Medication    0.9%  NaCl infusion (for blood administration)    0.9%  NaCl infusion    0.9%  NaCl infusion    acetaminophen tablet 1,000 mg    acetaminophen tablet 500 mg    albuterol nebulizer solution 2.5 mg    amphotericin B liposome (AMBISOME) 450 mg in dextrose 5 % 450 mL IVPB    bisacodyL EC tablet 10 mg    dextromethorphan-guaiFENesin  mg/5 ml liquid 10 mL    dextrose 50% injection 12.5 g    fentaNYL 2500 mcg in D5W 250 mL infusion premix (titrating) (conc: 10 mcg/mL)    glucagon (human recombinant) injection 1 mg    heparin (porcine) injection 4,000 Units    heparin (porcine) injection 5,000 Units    insulin aspart U-100 injection 1-10 Units    insulin detemir U-100 injection  20 Units    metoprolol injection 5 mg    NORepinephrine 32 mg in dextrose 5 % 250 mL infusion    ondansetron injection 8 mg    pantoprazole suspension 40 mg    sevelamer carbonate pwpk 0.8 g    simethicone chewable tablet 80 mg    ticagrelor tablet 90 mg    traZODone tablet 50 mg       VITALS:      Vital Signs Range (Last 24H):  Temp:  [98.3 °F (36.8 °C)-101.5 °F (38.6 °C)]   Pulse:  []   Resp:  [15-46]   BP: ()/(40-83)   SpO2:  [94 %-100 %]     I & O (Last 24H):    Intake/Output Summary (Last 24 hours) at 1/17/2022 1651  Last data filed at 1/17/2022 1557  Gross per 24 hour   Intake 1556 ml   Output 1500 ml   Net 56 ml       Physical Exam   Constitutional: Pt is sedated, looks ill.  Has lost weight since I last saw him almost a month ago.  Head: Normocephalic, scarring about right orbit from old injury.   Eyes, nose and throat:  Pale moist mucosa, anicteric and acyanotic, enucleated right eye, on reactive left pupil, unable to examine mouth as not able to get it open  Neck: Neck with tracheostomy, some yellowish mucoid drainage around the tubing, no obvious cervical lymphadenopathy  Cardiovascular: Normal rate and regular rhythm.  S1 and S2 appreciated by ascultation, no murmurs  Pulmonary/Chest: Effort normal, no crepitations or wheezes auscultated   Abdomen:  Wound VAC to large midline wound, not distended, normal bowel sounds. Soft.  Extremities:  Generalized body edema present. No clubbing or cyanosis  Skin: Warm and dry. No rashes.  Psychiatric:  Sedated    Laboratory Data:  Reviewed and noted in plan where applicable- Please see chart for full laboratory data.    No results for input(s): CPK, CPKMB, TROPONINI, MB in the last 24 hours. No results for input(s): POCTGLUCOSE in the last 24 hours.     Lab Results   Component Value Date    INR 1.35 (H) 12/31/2021       Lab Results   Component Value Date    WBC 18.04 (H) 01/15/2022    HGB 6.1 (L) 01/16/2022    HCT 17.1 (LL) 01/16/2022    MCV 85.3  01/15/2022     01/15/2022       BMP  No results for input(s): GLU, NA, K, CL, CO2, BUN, CREATININE, CALCIUM, MG in the last 24 hours.    Microbiology Results (last 7 days)     Procedure Component Value Units Date/Time    Blood culture (site 1) [[id number]] Collected: 01/16/22 1726    Order Status: Sent Specimen: Blood Updated: 01/16/22 1736    Blood culture (site 2) [[id number]] Collected: 01/16/22 1729    Order Status: Sent Specimen: Blood Updated: 01/16/22 1734    Blood culture (site 1) [[id number]] Collected: 01/10/22 1539    Order Status: Completed Specimen: Blood Updated: 01/16/22 0806     Culture, Blood No Growth at 5 days    Blood culture (site 2) [[id number]] Collected: 01/10/22 1547    Order Status: Completed Specimen: Blood Updated: 01/16/22 0806     Culture, Blood No Growth at 5 days    Blood culture (site 1) [[id number]] Collected: 01/09/22 1054    Order Status: Completed Specimen: Blood Updated: 01/15/22 0750     Culture, Blood No Growth at 5 days    Blood culture (site 2) [[id number]] Collected: 01/09/22 1100    Order Status: Completed Specimen: Blood Updated: 01/15/22 0750     Culture, Blood No Growth at 5 days    Blood culture (site 2) [[id number]] Collected: 01/05/22 1538    Order Status: Completed Specimen: Blood Updated: 01/11/22 0759     Culture, Blood No Growth at 5 days    Blood culture (site 1) [[id number]] Collected: 01/05/22 1523    Order Status: Completed Specimen: Blood Updated: 01/11/22 0759     Culture, Blood No Growth at 5 days          Radiology:  Reviewed and noted in plan where applicable- Please see chart for full radiology data.      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 Necrotizing fasciitis involving perineum and extending up into pelvis admitted originally in mid December.  E coli along with several anaerobes including Clostridium species isolated during I&D.  Fungal organisms with the appearance of mucormycosis noted on histopathology.  2. Multi organ failure due to above, on mechanical ventilation, requiring hemodialysis  3. Recurring fever, could be new hospital-acquired infection such as bacteremia or vent associated pneumonia.  4. History of diabetes        PLAN:  1. Agree with amphotericin B  2. Add Zosyn, renally dosed, along with gentamicin  3. Follow-up results of blood cultures sent yesterday  4. Chest x-ray and also I would 2 tracheal aspirate for Gram stain and culture  5. Continue aggressive wound care    Thank you very much for the consult.  Will follow.

## 2022-01-17 NOTE — PROGRESS NOTES
[facility]  Adult Nutrition  Follow-up Note         Reason for Assessment  Reason For Assessment: RD follow-up  Nutrition Risk Screen: tube feeding or parenteral nutrition  Malnutrition  Is Patient Malnourished: No  Nutrition Diagnosis  Inadequate oral intake   related to Decreased ability to consume sufficient energy as evidenced by pt on vent    Nutrition Diagnosis Status: Chronic/ continues      Nutrition Risk  Level of Risk/Frequency of Follow-up: high   Chewing or Swallowing Difficulty?: No Chewing or swallowing difficulty  Estimated/Assessed Needs  RMR (McMinn-St. Jeor Equation): 1772.13 Activity Factor: 1 Injury Factor: 1   Total Ve: 12 mL Temp: 98.8 °F (37.1 °C)Axillary  Weight Used For Calorie Calculations: 97 kg (213 lb 13.5 oz)   Energy Need Method: [place] (modified) Energy Calorie Requirements (kcal): 2223  Weight Used For Protein Calculations: 81 kg (178 lb 9.2 oz) (adjusted body wt)  Protein Requirements:   Estimated Fluid Requirement Method: RDA Method Fluid Requirements (mL): 2065  RDA Method (mL): 2223     Nutrition Prescription / Recommendations  Recommendation/Intervention: Vital AF @ 75ml/hr; H20 flush of 60ml q2hr  Goals: pt will tolerate tube feeding; pt will meet estimated nutritional needs  Nutrition Goal Status: goal met  Communication of RD Recs: reviewed with physician  Current Diet Order: Vital AF @ 75ml/hr; H20 flush of 60ml q2hr  Nutrition Order Comments: TF: Vital AF @ 75ml/hr; H20 flush of 60ml q2hr  Current Nutrition Support Formula Ordered: Vital AF 1.2  Current Nutrition Support Rate Ordered: 75 (ml)  Current Nutrition Support Frequency Ordered: hourly  Recommended Diet: Enteral Nutrition Vital AF @ 75ml/hr; H20 flush of 60ml q2hr  Recommended Oral Supplement: No Oral Supplements  Is Nutrition Support Recommended: No  Is Education Recommended: No  Monitor and Evaluation  % current Intake: Enteral Nutrition at goal  % intake to meet estimated needs:  Enteral Nutrition   Food and Nutrient Intake: enteral nutrition intake  Food and Nutrient Adminstration: enteral and parenteral nutrition administration  Anthropometric Measurements: height/length,body mass index,weight,weight change  Biochemical Data, Medical Tests and Procedures: electrolyte and renal panel,glucose/endocrine profile  Nutrition-Focused Physical Findings: skin  Enteral Calories (kcal): 2160  Enteral Protein (gm): 135  Enteral (Free Water) Fluid (mL): 1458  Free Water Flush Fluid (mL): 720  Parenteral Calories (kcal): 845  Parenteral Protein (gm): 48  Parenteral Fluid (mL): 960  Lipid Calories (kcals): 500 kcals  Other Calories (kcal): 528 (propofol)  Total Calories (kcal): 2160  Total Calories (kcal/kg): 2612  % Kcal Needs: 100  Total Protein (gm): 135  % Protein Needs: 100  IV Fluid (mL): 1764  Total Fluid Intake (mL): 2178  Energy Calories Required: meeting needs  Protein Required: meeting needs  Fluid Required: meeting needs  Tolerance: tolerating  Current Medical Diagnosis and Past Medical History  Diagnosis: gastrointestinal disease,infection/sepsis  Past Medical History:   Diagnosis Date    Hyperlipidemia     Hypertension      Nutrition/Diet History  Spiritual, Cultural Beliefs, Jainism Practices, Values that Affect Care: no  Food Allergies: NKFA  Factors Affecting Nutritional Intake: None identified at this time  Lab/Procedures/Meds  No results for input(s): NA, K, BUN, CREATININE, GLU, CALCIUM, ALBUMIN, CL, ALT, AST, PHOS in the last 72 hours.  Last A1c: No results found for: HGBA1C  Lab Results   Component Value Date    RBC 2.11 (L) 01/15/2022    HGB 6.1 (L) 01/16/2022    HCT 17.1 (LL) 01/16/2022    MCV 85.3 01/15/2022    MCH 30.3 01/15/2022    MCHC 35.6 01/15/2022     Pertinent Labs Reviewed: reviewed  Pertinent Labs Comments: Hct 17.1, BUN 74, Creat 4.12, Glu 145. Alb .6  Pertinent Medications Reviewed: reviewed  Pertinent Medications Comments: heparin, insulin  Anthropometrics  Temp:  "98.8 °F (37.1 °C)  Height Method: Stated  Height: 5' 11" (180.3 cm)  Height (inches): 71 in  Weight Method: Bed Scale  Weight: 97 kg (213 lb 13.5 oz)  Weight (lb): 213.85 lb  Ideal Body Weight (IBW), Male: 172 lb  % Ideal Body Weight, Male (lb): 115.36 %  BMI (Calculated): 29.8  BMI Grade: 25 - 29.9 - overweight     Nutrition by Nursing  Diet/Nutrition Received: tube feeding     Diet/Feeding Assistance: none  Diet/Feeding Tolerance: other (see comments) (tf)  Last Bowel Movement: 01/17/22       NG/OG Tube [place] sump 18 Fr. Left nostril-Feeding Type: continuous,by pump       NG/OG Tube [place] sump 18 Fr. Left nostril-Current Rate (mL/hr): 75 mL/hr       NG/OG Tube [place] sump 18 Fr. Left nostril-Goal Rate (mL/hr): 75 mL/hr       NG/OG Tube [place] sump 18 Fr. Left nostril-Formula Name: vital af  Nutrition Follow-Up  RD Follow-up?: Yes  Assessment and Plan  Assessment & plan notes cannot be loaded without a specified hospital service.     "

## 2022-01-17 NOTE — ASSESSMENT & PLAN NOTE
- s/p multiple debridements  - wound vac in place  - ID consulted for antibiotic management: He was treated with rocephin, daptomycin, clindamycin. 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 Amphotericin started on 1/7. The patient went to OR on 1/10 for debridement. Surgery note reviewed and appreciated.  1/12- back to OR today.  1/13- OR note reviewed. No purulence noted.    1/16- tmax 101.5 -- repeat BC   01/17/2022 low-grade temp cultures pending review antibiotics

## 2022-01-17 NOTE — PROGRESS NOTES
[facility]  Nephrology  Progress Note    Patient Name: Og Garcia  MRN: [id number]  Admission Date: 12/23/2021  Hospital Length of Stay: 25 days  Attending Provider: eCcil Abernathy DO   Primary Care Physician: Hector Arndt DNP, FNP-C  Principal Problem:Denice gangrene    Subjective:     HPI: The patient is known from the previous nephrology consult at [facility].  He is no at Specialty and continues to need dialysis support.      Interval History: The patient is more awake today.  No fevers or chills.    Review of patient's allergies indicates:  No Known Allergies  Current Facility-Administered Medications   Medication Frequency    0.9%  NaCl infusion (for blood administration) Q24H PRN    0.9%  NaCl infusion PRN    0.9%  NaCl infusion PRN    acetaminophen tablet 1,000 mg Q6H PRN    acetaminophen tablet 500 mg Q6H PRN    albuterol nebulizer solution 2.5 mg Q4H PRN    amphotericin B liposome (AMBISOME) 450 mg in dextrose 5 % 450 mL IVPB Q24H    bisacodyL EC tablet 10 mg Daily PRN    dextromethorphan-guaiFENesin  mg/5 ml liquid 10 mL Q6H PRN    dextrose 50% injection 12.5 g PRN    fentaNYL 2500 mcg in D5W 250 mL infusion premix (titrating) (conc: 10 mcg/mL) PRN    glucagon (human recombinant) injection 1 mg PRN    heparin (porcine) injection 4,000 Units PRN    heparin (porcine) injection 5,000 Units Q8H    insulin aspart U-100 injection 1-10 Units Q6H    insulin detemir U-100 injection 20 Units QHS    metoprolol injection 5 mg Q5 Min PRN    NORepinephrine 32 mg in dextrose 5 % 250 mL infusion PRN    ondansetron injection 8 mg Q6H PRN    pantoprazole suspension 40 mg Daily    sevelamer carbonate pwpk 0.8 g TID WM    simethicone chewable tablet 80 mg TID PRN    ticagrelor tablet 90 mg BID    traZODone tablet 50 mg Nightly PRN       Objective:     Vital Signs (Most Recent):  Temp: 98.7 °F (37.1 °C) (01/17/22 1200)  Pulse: 102 (01/17/22 1215)  Resp: (!) 32  (01/17/22 1215)  BP: 110/65 (01/17/22 1215)  SpO2: 99 % (01/17/22 1215)  O2 Device (Oxygen Therapy): ventilator (01/16/22 1700) Vital Signs (24h Range):  Temp:  [98.7 °F (37.1 °C)-101.5 °F (38.6 °C)] 98.7 °F (37.1 °C)  Pulse:  [] 102  Resp:  [15-46] 32  SpO2:  [94 %-100 %] 99 %  BP: ()/(40-79) 110/65     Weight: 97 kg (213 lb 13.5 oz) (01/14/22 0600)  Body mass index is 29.83 kg/m².  Body surface area is 2.2 meters squared.    I/O last 3 completed shifts:  In: 250 [I.V.:250]  Out: 500 [Stool:500]    Physical Exam  Vitals reviewed.   HENT:      Mouth/Throat:      Mouth: Mucous membranes are dry.   Eyes:      Pupils: Pupils are equal, round, and reactive to light.   Cardiovascular:      Rate and Rhythm: Regular rhythm.   Pulmonary:      Comments: ventilated  Abdominal:      Palpations: Abdomen is soft.   Neurological:      Mental Status: He is alert.         Significant Labs:  BMP:   Recent Labs   Lab 01/12/22  1441   *      K 3.3*      CO2 23   BUN 74*   CREATININE 4.12*   CALCIUM 5.7*   MG 1.9     CBC:   Recent Labs   Lab 01/15/22  0825 01/15/22  0825 01/16/22  0745   WBC 18.04*  --   --    RBC 2.11*  --   --    HGB 6.4*   < > 6.1*   HCT 18.0*   < > 17.1*     --   --    MCV 85.3  --   --    MCH 30.3  --   --    MCHC 35.6  --   --     < > = values in this interval not displayed.        Significant Imaging:  Labs: Reviewed    Assessment/Plan:     RAHAT (acute kidney injury)  Dialysis Monday   Continue with scheduled hemodialysis for this patient.  1/5/2022 Dialysis done today.  No other changes  1/6/2022 Continue with dialysis on tomorrow.  1/7/2022  Dialysis as scheduled today.  And dialysis on tomorrow.  1/10/2022 Dialysis as scheduled.  1/11/2022 No signs of renal recovery.  Continue with dialysis management  1/12/2022 Continue with scheduled hemodialysis for this patient.  1/13/2022 Dialysis scheduled for tomorrow.  1/14/2022 Dialysis scheduled for Saturday 1/16/2022 Tolerated  dialysis on Saturday.  Condition remains critical  1/17/2022  Continue with scheduled hemodialysis for this patient.  The situation remains critical.        Thank you for your consult. I will follow-up with patient. Please contact us if you have any additional questions.    Edwin Pryor Jr, MD  Nephrology  [facility]

## 2022-01-17 NOTE — ASSESSMENT & PLAN NOTE
Dialysis Monday   Continue with scheduled hemodialysis for this patient.  1/5/2022 Dialysis done today.  No other changes  1/6/2022 Continue with dialysis on tomorrow.  1/7/2022  Dialysis as scheduled today.  And dialysis on tomorrow.  1/10/2022 Dialysis as scheduled.  1/11/2022 No signs of renal recovery.  Continue with dialysis management  1/12/2022 Continue with scheduled hemodialysis for this patient.  1/13/2022 Dialysis scheduled for tomorrow.  1/14/2022 Dialysis scheduled for Saturday 1/16/2022 Tolerated dialysis on Saturday.  Condition remains critical  1/17/2022  Continue with scheduled hemodialysis for this patient.  The situation remains critical.

## 2022-01-17 NOTE — DIALYSIS ROUNDING
Patient seen on hemodialysis, he is tolerating this well, we will continue his treatment unchanged.

## 2022-01-17 NOTE — ASSESSMENT & PLAN NOTE
- H/H down to 5.5/16 - pt had bleeding from his HD insertion site yesterday this has now resolved.   - will transfuse 2 units PRBCs on HD  1/2- 9.7/27.5 - repeat CBC in AM  1/4- Hgb is 7.2. Not sure if he will be transfused during surgery. IF not we will try to get  Him transfused with next HD  01/06/2021 transfuse 1 unit of packed red blood cells today  01/10/2021 needs a unit of blood  1/11- repeat H&H in am. Continue to transfuse as needed.  1/13- Hgb is 5. Transfusing today. Post transfusion H&H  1/14- CBC pending for today  1/15- 6/18 - hemodynamically stable, recheck in AM   1/16-- 6.1/17.1 - critical low blood shortage in hospital - holding transfusion until next HD since he is hemodynamically stable and not actively bleeding   01/17/2021 day transfuse blood on dialysis today

## 2022-01-17 NOTE — PROGRESS NOTES
[facility]  Pulmonology  Progress Note    Patient Name: Og Garcia  MRN: [id number]  Admission Date: 12/23/2021  Hospital Length of Stay: 25 days  Code Status: Prior  Attending Provider: Cecil Abernathy DO  Primary Care Provider: Hector Arndt DNP, FNP-C   Principal Problem: Denice gangrene    Subjective:     Interval History:  Unresponsive    Objective:     Vital Signs (Most Recent):  Temp: 99.2 °F (37.3 °C) (01/17/22 0800)  Pulse: 107 (01/17/22 0800)  Resp: 18 (01/17/22 0800)  BP: (!) 112/55 (01/17/22 0800)  SpO2: 100 % (01/17/22 0800) Vital Signs (24h Range):  Temp:  [98.6 °F (37 °C)-101.5 °F (38.6 °C)] 99.2 °F (37.3 °C)  Pulse:  [] 107  Resp:  [16-46] 18  SpO2:  [94 %-100 %] 100 %  BP: ()/(40-77) 112/55     Weight: 97 kg (213 lb 13.5 oz)  Body mass index is 29.83 kg/m².      Intake/Output Summary (Last 24 hours) at 1/17/2022 0845  Last data filed at 1/16/2022 1731  Gross per 24 hour   Intake 250 ml   Output 500 ml   Net -250 ml       Physical Exam  Vitals reviewed.   Constitutional:       Appearance: Normal appearance. He is ill-appearing.      Interventions: He is not intubated.     Comments: Unresponsive   HENT:      Head: Normocephalic and atraumatic.      Nose: Nose normal.      Mouth/Throat:      Mouth: Mucous membranes are dry.      Pharynx: Oropharynx is clear.   Eyes:      Extraocular Movements: Extraocular movements intact.      Conjunctiva/sclera: Conjunctivae normal.      Pupils: Pupils are equal, round, and reactive to light.   Cardiovascular:      Rate and Rhythm: Normal rate.      Heart sounds: Normal heart sounds. No murmur heard.      Pulmonary:      Effort: Pulmonary effort is normal. He is not intubated.      Breath sounds: Normal breath sounds.   Abdominal:      General: Abdomen is flat. Bowel sounds are normal.      Palpations: Abdomen is soft.   Musculoskeletal:         General: Normal range of motion.      Cervical back: Normal range of  motion and neck supple.      Right lower leg: No edema.      Left lower leg: No edema.   Skin:     General: Skin is warm and dry.      Capillary Refill: Capillary refill takes less than 2 seconds.   Neurological:      General: No focal deficit present.      Mental Status: He is alert and oriented to person, place, and time.   Psychiatric:         Mood and Affect: Mood normal.         Behavior: Behavior normal.         Vents:  Vent Mode: A/C (01/17/22 0507)  Set Rate: 16 BPM (01/17/22 0507)  Vt Set: 480 mL (01/17/22 0507)  Pressure Support: 12 cmH20 (01/16/22 0810)  PEEP/CPAP: 5 cmH20 (01/17/22 0507)  Oxygen Concentration (%): 40 (01/16/22 1700)  Peak Airway Pressure: 21 cmH2O (01/17/22 0507)  Total Ve: 16.6 mL (01/17/22 0507)  F/VT Ratio<105 (RSBI): (!) 50.1 (01/16/22 0348)    Lines/Drains/Airways     Peripherally Inserted Central Catheter Line            PICC Double Lumen 01/05/22 left basilic 12 days          Central Venous Catheter Line                 Hemodialysis Catheter 12/30/21 0900 right internal jugular 17 days          Drain                 NG/OG Tube Barber sump 18 Fr. Left nostril -- days         Colostomy 12/18/21 1030 Descending/sigmoid LUQ 29 days          Airway                 Surgical Airway 01/04/22 Shiley 13 days          Peripheral Intravenous Line                 Peripheral IV - Single Lumen 01/14/22 1835 18 G Anterior;Left;Proximal Forearm 2 days                Significant Labs:    CBC/Anemia Profile:  Recent Labs   Lab 01/16/22  0745   HGB 6.1*   HCT 17.1*        Chemistries:  No results for input(s): NA, K, CL, CO2, BUN, CREATININE, CALCIUM, ALBUMIN, PROT, BILITOT, ALKPHOS, ALT, AST, GLUCOSE, MG, PHOS in the last 48 hours.    All pertinent labs within the past 24 hours have been reviewed.    Significant Imaging:  I have reviewed all pertinent imaging results/findings within the past 24 hours.    Assessment/Plan:     * Denice gangrene  - s/p multiple debridements  - wound vac in place  - ID  consulted for antibiotic management: He was treated with rocephin, daptomycin, clindamycin. 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 Amphotericin started on 1/7. The patient went to OR on 1/10 for debridement. Surgery note reviewed and appreciated.  1/12- back to OR today.  1/13- OR note reviewed. No purulence noted.    1/16- tmax 101.5 -- repeat BC   01/17/2022 low-grade temp cultures pending review antibiotics    On mechanically assisted ventilation  - 12/14 intubated, tracheostomy 1/4  Currently very ill have not been able to wean  1/11- remains very sick. Not able to tolerate any weaning trials. He is oxygenating adequately on 40%. Prognosis is poor.  1/13- no significant change. He is critically ill and not able to tolerate weaning trials currently   If I can get him off of pressors I will try some SIMV with reduced rate.  1/14- placed him on PSV while I was in the room and he did well. We will try 1 hour bid today  1/16- not doing well on PSV   01/17/2022 patient doing not doing well weaning from the ventilator      RAHAT (acute kidney injury)  - nephrology consulted   - no sign of renal recovery.  Continuing with scheduled hemodialysis for this patient.  - tunneled HD line placement 12/30  Continues with dialysis  1/13 nephrology note reviewed and appreciated. No renal recovery. Continue with HD per nephrology    1/16-- continue HD         Disseminated mucormycosis  Patient received 4.5 g of amphotericin liposomal will ask Dr. Hall look at the patient today    Type 2 diabetes mellitus without  complication, without long-term current use of insulin  - continue basal/bolus insulin  -accuchecks are reasonable currently      Stable     Acute blood loss anemia  - H/H down to 5.5/16 - pt had bleeding from his HD insertion site yesterday this has now resolved.   - will transfuse 2 units PRBCs on HD  1/2- 9.7/27.5 - repeat CBC in AM  1/4- Hgb is 7.2. Not sure if he will be transfused during surgery. IF not we will try to get  Him transfused with next HD  01/06/2021 transfuse 1 unit of packed red blood cells today  01/10/2021 needs a unit of blood  1/11- repeat H&H in am. Continue to transfuse as needed.  1/13- Hgb is 5. Transfusing today. Post transfusion H&H  1/14- CBC pending for today  1/15- 6/18 - hemodynamically stable, recheck in AM   1/16-- 6.1/17.1 - critical low blood shortage in hospital - holding transfusion until next HD since he is hemodynamically stable and not actively bleeding   01/17/2021 day transfuse blood on dialysis today                 Jose Urban MD  Pulmonology  [facility]

## 2022-01-17 NOTE — SUBJECTIVE & OBJECTIVE
Interval History: The patient is more awake today.  No fevers or chills.    Review of patient's allergies indicates:  No Known Allergies  Current Facility-Administered Medications   Medication Frequency    0.9%  NaCl infusion (for blood administration) Q24H PRN    0.9%  NaCl infusion PRN    0.9%  NaCl infusion PRN    acetaminophen tablet 1,000 mg Q6H PRN    acetaminophen tablet 500 mg Q6H PRN    albuterol nebulizer solution 2.5 mg Q4H PRN    amphotericin B liposome (AMBISOME) 450 mg in dextrose 5 % 450 mL IVPB Q24H    bisacodyL EC tablet 10 mg Daily PRN    dextromethorphan-guaiFENesin  mg/5 ml liquid 10 mL Q6H PRN    dextrose 50% injection 12.5 g PRN    fentaNYL 2500 mcg in D5W 250 mL infusion premix (titrating) (conc: 10 mcg/mL) PRN    glucagon (human recombinant) injection 1 mg PRN    heparin (porcine) injection 4,000 Units PRN    heparin (porcine) injection 5,000 Units Q8H    insulin aspart U-100 injection 1-10 Units Q6H    insulin detemir U-100 injection 20 Units QHS    metoprolol injection 5 mg Q5 Min PRN    NORepinephrine 32 mg in dextrose 5 % 250 mL infusion PRN    ondansetron injection 8 mg Q6H PRN    pantoprazole suspension 40 mg Daily    sevelamer carbonate pwpk 0.8 g TID WM    simethicone chewable tablet 80 mg TID PRN    ticagrelor tablet 90 mg BID    traZODone tablet 50 mg Nightly PRN       Objective:     Vital Signs (Most Recent):  Temp: 98.7 °F (37.1 °C) (01/17/22 1200)  Pulse: 102 (01/17/22 1215)  Resp: (!) 32 (01/17/22 1215)  BP: 110/65 (01/17/22 1215)  SpO2: 99 % (01/17/22 1215)  O2 Device (Oxygen Therapy): ventilator (01/16/22 1700) Vital Signs (24h Range):  Temp:  [98.7 °F (37.1 °C)-101.5 °F (38.6 °C)] 98.7 °F (37.1 °C)  Pulse:  [] 102  Resp:  [15-46] 32  SpO2:  [94 %-100 %] 99 %  BP: ()/(40-79) 110/65     Weight: 97 kg (213 lb 13.5 oz) (01/14/22 0600)  Body mass index is 29.83 kg/m².  Body surface area is 2.2 meters squared.    I/O last 3 completed  shifts:  In: 250 [I.V.:250]  Out: 500 [Stool:500]    Physical Exam  Vitals reviewed.   HENT:      Mouth/Throat:      Mouth: Mucous membranes are dry.   Eyes:      Pupils: Pupils are equal, round, and reactive to light.   Cardiovascular:      Rate and Rhythm: Regular rhythm.   Pulmonary:      Comments: ventilated  Abdominal:      Palpations: Abdomen is soft.   Neurological:      Mental Status: He is alert.         Significant Labs:  BMP:   Recent Labs   Lab 01/12/22  1441   *      K 3.3*      CO2 23   BUN 74*   CREATININE 4.12*   CALCIUM 5.7*   MG 1.9     CBC:   Recent Labs   Lab 01/15/22  0825 01/15/22  0825 01/16/22  0745   WBC 18.04*  --   --    RBC 2.11*  --   --    HGB 6.4*   < > 6.1*   HCT 18.0*   < > 17.1*     --   --    MCV 85.3  --   --    MCH 30.3  --   --    MCHC 35.6  --   --     < > = values in this interval not displayed.        Significant Imaging:  Labs: Reviewed

## 2022-01-17 NOTE — ASSESSMENT & PLAN NOTE
- 12/14 intubated, tracheostomy 1/4  Currently very ill have not been able to wean  1/11- remains very sick. Not able to tolerate any weaning trials. He is oxygenating adequately on 40%. Prognosis is poor.  1/13- no significant change. He is critically ill and not able to tolerate weaning trials currently   If I can get him off of pressors I will try some SIMV with reduced rate.  1/14- placed him on PSV while I was in the room and he did well. We will try 1 hour bid today  1/16- not doing well on PSV   01/17/2022 patient doing not doing well weaning from the ventilator

## 2022-01-18 NOTE — ASSESSMENT & PLAN NOTE
- s/p multiple debridements  - wound vac in place  - ID consulted for antibiotic management: He was treated with rocephin, daptomycin, clindamycin. 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 Amphotericin started on 1/7. The patient went to OR on 1/10 for debridement. Surgery note reviewed and appreciated.  1/12- back to OR today.  1/13- OR note reviewed. No purulence noted.    1/16- tmax 101.5 -- repeat BC   01/17/2022 low-grade temp cultures pending review antibiotics  01/18/2022 I really appreciate Dr. Hall help on this patient see her orders

## 2022-01-18 NOTE — SUBJECTIVE & OBJECTIVE
Interval History:  Unresponsive    Objective:     Vital Signs (Most Recent):  Temp: 99.6 °F (37.6 °C) (01/18/22 0000)  Pulse: 99 (01/18/22 0400)  Resp: (!) 22 (01/18/22 0400)  BP: 125/70 (01/18/22 0400)  SpO2: 100 % (01/18/22 0400) Vital Signs (24h Range):  Temp:  [98.3 °F (36.8 °C)-99.6 °F (37.6 °C)] 99.6 °F (37.6 °C)  Pulse:  [] 99  Resp:  [15-45] 22  SpO2:  [94 %-100 %] 100 %  BP: ()/(45-83) 125/70     Weight: 97 kg (213 lb 13.5 oz)  Body mass index is 29.83 kg/m².      Intake/Output Summary (Last 24 hours) at 1/18/2022 0535  Last data filed at 1/17/2022 1854  Gross per 24 hour   Intake 4109 ml   Output 3230 ml   Net 879 ml       Physical Exam  Vitals reviewed.   Constitutional:       Appearance: Normal appearance.      Interventions: He is not intubated.  HENT:      Head: Normocephalic and atraumatic.      Nose: Nose normal.      Mouth/Throat:      Mouth: Mucous membranes are dry.      Pharynx: Oropharynx is clear.   Eyes:      Extraocular Movements: Extraocular movements intact.      Conjunctiva/sclera: Conjunctivae normal.      Pupils: Pupils are equal, round, and reactive to light.   Cardiovascular:      Rate and Rhythm: Normal rate.      Heart sounds: Normal heart sounds. No murmur heard.      Pulmonary:      Effort: Pulmonary effort is normal. He is not intubated.      Breath sounds: Normal breath sounds.   Abdominal:      General: Abdomen is flat. Bowel sounds are normal.      Palpations: Abdomen is soft.   Musculoskeletal:         General: Normal range of motion.      Cervical back: Normal range of motion and neck supple.      Right lower leg: No edema.      Left lower leg: No edema.   Skin:     General: Skin is warm and dry.      Capillary Refill: Capillary refill takes less than 2 seconds.   Neurological:      General: No focal deficit present.      Mental Status: He is alert and oriented to person, place, and time.   Psychiatric:         Mood and Affect: Mood normal.         Behavior:  Behavior normal.         Vents:  Vent Mode: A/C (01/18/22 0400)  Set Rate: 16 BPM (01/18/22 0400)  Vt Set: 480 mL (01/18/22 0400)  Pressure Support: 12 cmH20 (01/16/22 0810)  PEEP/CPAP: 5 cmH20 (01/18/22 0400)  Oxygen Concentration (%): 40 (01/18/22 0400)  Peak Airway Pressure: 27 cmH2O (01/18/22 0000)  Total Ve: 5.6 mL (01/18/22 0400)  F/VT Ratio<105 (RSBI): (!) 67.9 (01/18/22 0400)    Lines/Drains/Airways     Peripherally Inserted Central Catheter Line            PICC Double Lumen 01/05/22 left basilic 13 days          Central Venous Catheter Line                 Hemodialysis Catheter 12/30/21 0900 right internal jugular 18 days          Drain                 NG/OG Tube [place] sump 18 Fr. Left nostril -- days         Colostomy 12/18/21 1030 Descending/sigmoid LUQ 30 days          Airway                 Surgical Airway 01/04/22 Shiley 14 days          Peripheral Intravenous Line                 Peripheral IV - Single Lumen 01/14/22 1835 18 G Anterior;Left;Proximal Forearm 3 days                Significant Labs:    CBC/Anemia Profile:  Recent Labs   Lab 01/16/22  0745 01/17/22  1916   HGB 6.1* 9.1*   HCT 17.1* 26.6*        Chemistries:  No results for input(s): NA, K, CL, CO2, BUN, CREATININE, CALCIUM, ALBUMIN, PROT, BILITOT, ALKPHOS, ALT, AST, GLUCOSE, MG, PHOS in the last 48 hours.    All pertinent labs within the past 24 hours have been reviewed.    Significant Imaging:  I have reviewed all pertinent imaging results/findings within the past 24 hours.

## 2022-01-18 NOTE — PROGRESS NOTES
Pharmacokinetic Initial Assessment: IV Vancomycin    Assessment/Plan:    Pt is a dialysis pt.  Pharmacy has ordered a one time dose of vanc 2000 mg IV to be given today.  Desired empiric serum random concentration is < 20  Draw vancomycin random level on 1/21 at 0400.  Pharmacy will continue to follow and monitor vancomycin.      Please contact pharmacy at extension [phone] with any questions regarding this assessment.     Patient brief summary:  Og Garcia is a 66 y.o. male initiated on antimicrobial therapy with IV Vancomycin for treatment of suspected bacteremia    Drug Allergies:   Review of patient's allergies indicates:  No Known Allergies    Actual Body Weight:   97 kg    Renal Function:   Estimated Creatinine Clearance: 25.8 mL/min (A) (based on SCr of 3.34 mg/dL (H)).,     Dialysis Method (if applicable):  intermittent HD    CBC (last 72 hours):  Recent Labs   Lab Result Units 01/16/22  0745 01/17/22  1916 01/18/22  0954   WBC K/uL  --   --  13.43*   Hemoglobin g/dL 6.1* 9.1* 8.0*   Hematocrit % 17.1* 26.6* 22.7*   Platelet Count K/uL  --   --  178   Lymphocytes % %  --   --  6.8*   Monocytes % %  --   --  9.7*   Eosinophils % %  --   --  0.6*   Basophils % %  --   --  0.4       Metabolic Panel (last 72 hours):  Recent Labs   Lab Result Units 01/18/22  0605   Sodium mmol/L 132*   Potassium mmol/L 4.6   Chloride mmol/L 97*   CO2 mmol/L 22   Glucose mg/dL 85   BUN mg/dL 59*   Creatinine mg/dL 3.34*   Albumin g/dL 0.8*   Bilirubin, Total mg/dL 0.4   Alk Phos U/L 369*   AST U/L 61*   ALT U/L 39   Magnesium mg/dL 2.2       Drug levels (last 3 results):  No results for input(s): VANCOMYCINRA, VANCOMYCINPE, VANCOMYCINTR in the last 72 hours.    Microbiologic Results:  Microbiology Results (last 7 days)       Procedure Component Value Units Date/Time    Blood culture (site 1) [[id number]]  (Abnormal) Collected: 01/16/22 6541    Order Status: Completed Specimen: Blood Updated: 01/18/22 1431     Gram Stain Result  Gram positive cocci     Comment: From Aerobic Bottle  This is an appended report. These results have been appended to a previously preliminary verified report.       Culture, Lower Respiratory [[id number]]  (Abnormal) Collected: 01/17/22 1808    Order Status: Completed Specimen: Respiratory from Tracheal Aspirate Updated: 01/18/22 1043     Culture, Lower Respiratory Heavy Growth Gram-negative Bacilli     Comment: Identification and Susceptibility to Follow        Gram Stain Result Moderate WBC seen      Moderate Gram negative bacilli      Rare Gram positive bacilli      Rare Epithelial cells    Blood culture (site 2) [[id number]] Collected: 01/16/22 1729    Order Status: Completed Specimen: Blood Updated: 01/18/22 0902     Culture, Blood No Growth At 24 Hours    Gram Stain [[id number]] Collected: 01/17/22 1808    Order Status: Canceled Specimen: Respiratory from Tracheal Aspirate Updated: 01/17/22 1928    Blood culture (site 1) [[id number]] Collected: 01/10/22 1539    Order Status: Completed Specimen: Blood Updated: 01/16/22 0806     Culture, Blood No Growth at 5 days    Blood culture (site 2) [[id number]] Collected: 01/10/22 1547    Order Status: Completed Specimen: Blood Updated: 01/16/22 0806     Culture, Blood No Growth at 5 days    Blood culture (site 1) [[id number]] Collected: 01/09/22 1054    Order Status: Completed Specimen: Blood Updated: 01/15/22 0750     Culture, Blood No Growth at 5 days    Blood culture (site 2) [[id number]] Collected: 01/09/22 1100    Order Status: Completed Specimen: Blood Updated: 01/15/22 0750     Culture, Blood No Growth at 5 days

## 2022-01-18 NOTE — ASSESSMENT & PLAN NOTE
- H/H down to 5.5/16 - pt had bleeding from his HD insertion site yesterday this has now resolved.   - will transfuse 2 units PRBCs on HD  1/2- 9.7/27.5 - repeat CBC in AM  1/4- Hgb is 7.2. Not sure if he will be transfused during surgery. IF not we will try to get  Him transfused with next HD  01/06/2021 transfuse 1 unit of packed red blood cells today  01/10/2021 needs a unit of blood  1/11- repeat H&H in am. Continue to transfuse as needed.  1/13- Hgb is 5. Transfusing today. Post transfusion H&H  1/14- CBC pending for today  1/15- 6/18 - hemodynamically stable, recheck in AM   1/16-- 6.1/17.1 - critical low blood shortage in hospital - holding transfusion until next HD since he is hemodynamically stable and not actively bleeding   01/17/2021 day transfuse blood on dialysis today  01/18/2020 to hemoglobin up to 9 after transfusion

## 2022-01-18 NOTE — ASSESSMENT & PLAN NOTE
Dialysis Monday   Continue with scheduled hemodialysis for this patient.  1/5/2022 Dialysis done today.  No other changes  1/6/2022 Continue with dialysis on tomorrow.  1/7/2022  Dialysis as scheduled today.  And dialysis on tomorrow.  1/10/2022 Dialysis as scheduled.  1/11/2022 No signs of renal recovery.  Continue with dialysis management  1/12/2022 Continue with scheduled hemodialysis for this patient.  1/13/2022 Dialysis scheduled for tomorrow.  1/14/2022 Dialysis scheduled for Saturday 1/16/2022 Tolerated dialysis on Saturday.  Condition remains critical  1/17/2022  Continue with scheduled hemodialysis for this patient.  The situation remains critical.  1/18/2022 The patient's condition is the same.

## 2022-01-18 NOTE — PROGRESS NOTES
[facility]  Pulmonology  Progress Note    Patient Name: Og Garcia  MRN: [id number]  Admission Date: 12/23/2021  Hospital Length of Stay: 26 days  Code Status: Prior  Attending Provider: Cecil Abernathy DO  Primary Care Provider: Hector Arndt DNP, FNP-C   Principal Problem: Denice gangrene    Subjective:     Interval History:  Unresponsive    Objective:     Vital Signs (Most Recent):  Temp: 99.6 °F (37.6 °C) (01/18/22 0000)  Pulse: 99 (01/18/22 0400)  Resp: (!) 22 (01/18/22 0400)  BP: 125/70 (01/18/22 0400)  SpO2: 100 % (01/18/22 0400) Vital Signs (24h Range):  Temp:  [98.3 °F (36.8 °C)-99.6 °F (37.6 °C)] 99.6 °F (37.6 °C)  Pulse:  [] 99  Resp:  [15-45] 22  SpO2:  [94 %-100 %] 100 %  BP: ()/(45-83) 125/70     Weight: 97 kg (213 lb 13.5 oz)  Body mass index is 29.83 kg/m².      Intake/Output Summary (Last 24 hours) at 1/18/2022 0535  Last data filed at 1/17/2022 1854  Gross per 24 hour   Intake 4109 ml   Output 3230 ml   Net 879 ml       Physical Exam  Vitals reviewed.   Constitutional:       Appearance: Normal appearance.      Interventions: He is not intubated.  HENT:      Head: Normocephalic and atraumatic.      Nose: Nose normal.      Mouth/Throat:      Mouth: Mucous membranes are dry.      Pharynx: Oropharynx is clear.   Eyes:      Extraocular Movements: Extraocular movements intact.      Conjunctiva/sclera: Conjunctivae normal.      Pupils: Pupils are equal, round, and reactive to light.   Cardiovascular:      Rate and Rhythm: Normal rate.      Heart sounds: Normal heart sounds. No murmur heard.      Pulmonary:      Effort: Pulmonary effort is normal. He is not intubated.      Breath sounds: Normal breath sounds.   Abdominal:      General: Abdomen is flat. Bowel sounds are normal.      Palpations: Abdomen is soft.   Musculoskeletal:         General: Normal range of motion.      Cervical back: Normal range of motion and neck supple.      Right lower leg: No  edema.      Left lower leg: No edema.   Skin:     General: Skin is warm and dry.      Capillary Refill: Capillary refill takes less than 2 seconds.   Neurological:      General: No focal deficit present.      Mental Status: He is alert and oriented to person, place, and time.   Psychiatric:         Mood and Affect: Mood normal.         Behavior: Behavior normal.         Vents:  Vent Mode: A/C (01/18/22 0400)  Set Rate: 16 BPM (01/18/22 0400)  Vt Set: 480 mL (01/18/22 0400)  Pressure Support: 12 cmH20 (01/16/22 0810)  PEEP/CPAP: 5 cmH20 (01/18/22 0400)  Oxygen Concentration (%): 40 (01/18/22 0400)  Peak Airway Pressure: 27 cmH2O (01/18/22 0000)  Total Ve: 5.6 mL (01/18/22 0400)  F/VT Ratio<105 (RSBI): (!) 67.9 (01/18/22 0400)    Lines/Drains/Airways     Peripherally Inserted Central Catheter Line            PICC Double Lumen 01/05/22 left basilic 13 days          Central Venous Catheter Line                 Hemodialysis Catheter 12/30/21 0900 right internal jugular 18 days          Drain                 NG/OG Tube [place] sump 18 Fr. Left nostril -- days         Colostomy 12/18/21 1030 Descending/sigmoid LUQ 30 days          Airway                 Surgical Airway 01/04/22 [place] 14 days          Peripheral Intravenous Line                 Peripheral IV - Single Lumen 01/14/22 1835 18 G Anterior;Left;Proximal Forearm 3 days                Significant Labs:    CBC/Anemia Profile:  Recent Labs   Lab 01/16/22  0745 01/17/22  1916   HGB 6.1* 9.1*   HCT 17.1* 26.6*        Chemistries:  No results for input(s): NA, K, CL, CO2, BUN, CREATININE, CALCIUM, ALBUMIN, PROT, BILITOT, ALKPHOS, ALT, AST, GLUCOSE, MG, PHOS in the last 48 hours.    All pertinent labs within the past 24 hours have been reviewed.    Significant Imaging:  I have reviewed all pertinent imaging results/findings within the past 24 hours.    Assessment/Plan:     * Denice gangrene  - s/p multiple debridements  - wound vac in place  - ID consulted for  antibiotic management: He was treated with rocephin, daptomycin, clindamycin. 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 Amphotericin started on 1/7. The patient went to OR on 1/10 for debridement. Surgery note reviewed and appreciated.  1/12- back to OR today.  1/13- OR note reviewed. No purulence noted.    1/16- tmax 101.5 -- repeat BC   01/17/2022 low-grade temp cultures pending review antibiotics  01/18/2022 I really appreciate Dr. Hall help on this patient see her orders    On mechanically assisted ventilation  - 12/14 intubated, tracheostomy 1/4  Currently very ill have not been able to wean  1/11- remains very sick. Not able to tolerate any weaning trials. He is oxygenating adequately on 40%. Prognosis is poor.  1/13- no significant change. He is critically ill and not able to tolerate weaning trials currently   If I can get him off of pressors I will try some SIMV with reduced rate.  1/14- placed him on PSV while I was in the room and he did well. We will try 1 hour bid today  1/16- not doing well on PSV   01/17/2022 patient doing not doing well weaning from the ventilator  01/18/2022 patient has multiple problems ongoing is unable to be weaned from the vent currently      RAHAT (acute kidney injury)  - nephrology consulted   - no sign of renal recovery.  Continuing with scheduled hemodialysis for this patient.  - tunneled HD line placement 12/30  Continues with dialysis  1/13 nephrology note reviewed and appreciated. No renal recovery. Continue with HD per nephrology    1/18-- continue HD          Hypertension  Currently hypotensive. Holding any antihypertensives  BP looks a little better this am. Weaning pressor as tolerated    Disseminated mucormycosis  Patient received 4.5 g of amphotericin liposomal will ask Dr. Hall look at the patient today    Type 2 diabetes mellitus without complication, without long-term current use of insulin  - continue basal/bolus insulin  -accuchecks are reasonable currently      Stable     Acute blood loss anemia  - H/H down to 5.5/16 - pt had bleeding from his HD insertion site yesterday this has now resolved.   - will transfuse 2 units PRBCs on HD  1/2- 9.7/27.5 - repeat CBC in AM  1/4- Hgb is 7.2. Not sure if he will be transfused during surgery. IF not we will try to get  Him transfused with next HD  01/06/2021 transfuse 1 unit of packed red blood cells today  01/10/2021 needs a unit of blood  1/11- repeat H&H in am. Continue to transfuse as needed.  1/13- Hgb is 5. Transfusing today. Post transfusion H&H  1/14- CBC pending for today  1/15- 6/18 - hemodynamically stable, recheck in AM   1/16-- 6.1/17.1 - critical low blood shortage in hospital - holding transfusion until next HD since he is hemodynamically stable and not actively bleeding   01/17/2021 day transfuse blood on dialysis today  01/18/2020 to hemoglobin up to 9 after transfusion    Necrotizing fasciitis  Ongoing issue                 Jose Urban MD  Pulmonology  [facility]

## 2022-01-18 NOTE — PROGRESS NOTES
Pharmacy consulted to dose and follow gentamicin for pneumonia in this 65 y/o male patient on hemodialysis.    After hours pharmacy will dose 170 mg x1 (2mg/kg adjusted wt) and let [facility] pharmacist  in the morning.    Thank you for the consult,  Evelia Cotton, PharmD  1222

## 2022-01-18 NOTE — PROGRESS NOTES
[facility]  Nephrology  Progress Note    Patient Name: Og Garcia  MRN: [id number]  Admission Date: 12/23/2021  Hospital Length of Stay: 26 days  Attending Provider: Cecil Abernathy DO   Primary Care Physician: Hector Arndt DNP, FNP-C  Principal Problem:Denice gangrene    Subjective:     HPI: The patient is known from the previous nephrology consult at [facility].  He is no at [facility] and continues to need dialysis support.      Interval History: The patient's condition is about the same.  No other acute changes.    Review of patient's allergies indicates:  No Known Allergies  Current Facility-Administered Medications   Medication Frequency    0.9%  NaCl infusion (for blood administration) Q24H PRN    0.9%  NaCl infusion PRN    0.9%  NaCl infusion PRN    acetaminophen tablet 1,000 mg Q6H PRN    acetaminophen tablet 500 mg Q6H PRN    albuterol nebulizer solution 2.5 mg Q4H PRN    amphotericin B liposome (AMBISOME) 450 mg in dextrose 5 % 450 mL IVPB Q24H    bisacodyL EC tablet 10 mg Daily PRN    dextromethorphan-guaiFENesin  mg/5 ml liquid 10 mL Q6H PRN    dextrose 50% injection 12.5 g PRN    fentaNYL 2500 mcg in D5W 250 mL infusion premix (titrating) (conc: 10 mcg/mL) PRN    gentamicin - pharmacy to dose pharmacy to manage frequency    glucagon (human recombinant) injection 1 mg PRN    heparin (porcine) injection 4,000 Units PRN    heparin (porcine) injection 5,000 Units Q8H    insulin aspart U-100 injection 1-10 Units Q6H    insulin detemir U-100 injection 20 Units QHS    metoprolol injection 5 mg Q5 Min PRN    NORepinephrine 32 mg in dextrose 5 % 250 mL infusion PRN    ondansetron injection 8 mg Q6H PRN    pantoprazole suspension 40 mg Daily    piperacillin-tazobactam (ZOSYN) 4.5 g in dextrose 5 % in water (D5W) 5 % 100 mL IVPB (MB+) Q12H    sevelamer carbonate pwpk 0.8 g TID WM    simethicone chewable tablet 80 mg TID PRN    ticagrelor tablet 90 mg  BID    traZODone tablet 50 mg Nightly PRN    vancomycin (VANCOCIN) 2,000 mg in dextrose 5 % 500 mL IVPB Once    vancomycin - pharmacy to dose pharmacy to manage frequency       Objective:     Vital Signs (Most Recent):  Temp: 99.1 °F (37.3 °C) (01/18/22 0718)  Pulse: 96 (01/18/22 1200)  Resp: 17 (01/18/22 1200)  BP: 135/69 (01/18/22 1200)  SpO2: 99 % (01/18/22 1200)  O2 Device (Oxygen Therapy): ventilator (01/18/22 0818) Vital Signs (24h Range):  Temp:  [98.6 °F (37 °C)-99.6 °F (37.6 °C)] 99.1 °F (37.3 °C)  Pulse:  [] 96  Resp:  [15-34] 17  SpO2:  [96 %-100 %] 99 %  BP: ()/(50-82) 135/69     Weight: 97 kg (213 lb 13.5 oz) (01/14/22 0600)  Body mass index is 29.83 kg/m².  Body surface area is 2.2 meters squared.    I/O last 3 completed shifts:  In: 4109 [I.V.:60; Blood:1659; Other:1030; NG/GT:1260; IV Piggyback:100]  Out: 3380 [Other:2980; Stool:400]    Physical Exam  Vitals reviewed.   Cardiovascular:      Rate and Rhythm: Regular rhythm.      Heart sounds: Normal heart sounds.   Pulmonary:      Breath sounds: Normal breath sounds.      Comments: Ventilated  Abdominal:      General: Bowel sounds are normal.      Palpations: Abdomen is soft.         Significant Labs:  BMP:   Recent Labs   Lab 01/18/22  0605   GLU 85   *   K 4.6   CL 97*   CO2 22   BUN 59*   CREATININE 3.34*   CALCIUM 7.1*   MG 2.2     CBC:   Recent Labs   Lab 01/18/22  0954   WBC 13.43*   RBC 2.61*   HGB 8.0*   HCT 22.7*      MCV 87.0   MCH 30.7   MCHC 35.2        Significant Imaging:  Labs: Reviewed    Assessment/Plan:     RAHAT (acute kidney injury)  Dialysis Monday   Continue with scheduled hemodialysis for this patient.  1/5/2022 Dialysis done today.  No other changes  1/6/2022 Continue with dialysis on tomorrow.  1/7/2022  Dialysis as scheduled today.  And dialysis on tomorrow.  1/10/2022 Dialysis as scheduled.  1/11/2022 No signs of renal recovery.  Continue with dialysis management  1/12/2022 Continue with scheduled  hemodialysis for this patient.  1/13/2022 Dialysis scheduled for tomorrow.  1/14/2022 Dialysis scheduled for Saturday 1/16/2022 Tolerated dialysis on Saturday.  Condition remains critical  1/17/2022  Continue with scheduled hemodialysis for this patient.  The situation remains critical.  1/18/2022 The patient's condition is the same.        Thank you for your consult. I will follow-up with patient. Please contact us if you have any additional questions.    Edwin Pryor Jr, MD  Nephrology  [facility]

## 2022-01-18 NOTE — PROGRESS NOTES
Progress Note                                                           Infectious disease   Admit Date: 12/23/2021    SUBJECTIVE:     Follow-up For:  Denice gangrene    HPI/Interval history: No fever for more than 24hrs. No new events since yesterday per nurses.     Review of Systems:    Unable to obtain as he is on the vent.      OBJECTIVE:     Vital Signs Range (Last 24H):  Temp:  [98.3 °F (36.8 °C)-99.6 °F (37.6 °C)]   Pulse:  []   Resp:  [15-34]   BP: ()/(50-83)   SpO2:  [96 %-100 %]     Physical Exam   Constitutional: Pt is sedated but arousable, looks ill.    Head: Normocephalic, scarring about right orbit from old injury.   Eyes, nose and throat:  Pale moist mucosa, anicteric and acyanotic, enucleated right eye, on reactive left pupil, unable to examine mouth as not able to get it open  Neck: Neck with tracheostomy, some yellowish-brown mucoid drainage around the tubing  Cardiovascular: Normal rate and regular rhythm.  S1 and S2 appreciated by ascultation, no murmurs  Pulmonary/Chest: Effort normal, no crepitations or wheezes auscultated   Abdomen:  Wound VAC to large midline wound photograph of the wound noted, some necrotic tissue still in situ, colostomy present, not distended, normal bowel sounds. Soft.  Musculoskeletal:  Photographs of groin and perineal wounds reviewed  Extremities:  Generalized body edema present. No clubbing or cyanosis  Skin: Warm and dry. No rashes.  Psychiatric:  Sedated but arousable    Laboratory:  CBC:   Recent Labs   Lab 01/15/22  0825 01/16/22  0745 01/17/22  1916   WBC 18.04*  --   --    RBC 2.11*  --   --    HGB 6.4*   < > 9.1*   HCT 18.0*   < > 26.6*     --   --    MCV 85.3  --   --    MCH 30.3  --   --    MCHC 35.6  --   --     < > = values in this interval not displayed.     BMP:   Recent Labs   Lab 01/18/22  0605   GLU 85   *   K 4.6   CL 97*   CO2 22   BUN 59*   CREATININE 3.34*   CALCIUM 7.1*   MG 2.2     CMP:   Recent Labs   Lab  01/18/22  0605   GLU 85   CALCIUM 7.1*   ALBUMIN 0.8*   PROT 5.6*   *   K 4.6   CO2 22   CL 97*   BUN 59*   CREATININE 3.34*   ALKPHOS 369*   ALT 39   AST 61*   BILITOT 0.4     Microbiology Results (last 7 days)     Procedure Component Value Units Date/Time    Blood culture (site 2) [[id number]] Collected: 01/16/22 1729    Order Status: Completed Specimen: Blood Updated: 01/18/22 0902     Culture, Blood No Growth At 24 Hours    Blood culture (site 1) [[id number]] Collected: 01/16/22 1726    Order Status: Completed Specimen: Blood Updated: 01/18/22 0902     Culture, Blood No Growth At 24 Hours    Culture, Lower Respiratory [[id number]] Collected: 01/17/22 1808    Order Status: Completed Specimen: Respiratory from Tracheal Aspirate Updated: 01/17/22 2101     Gram Stain Result Moderate WBC seen      Moderate Gram negative bacilli      Rare Gram positive bacilli      Rare Epithelial cells    Gram Stain [[id number]] Collected: 01/17/22 1808    Order Status: Canceled Specimen: Respiratory from Tracheal Aspirate Updated: 01/17/22 1928    Blood culture (site 1) [[id number]] Collected: 01/10/22 1539    Order Status: Completed Specimen: Blood Updated: 01/16/22 0806     Culture, Blood No Growth at 5 days    Blood culture (site 2) [[id number]] Collected: 01/10/22 1547    Order Status: Completed Specimen: Blood Updated: 01/16/22 0806     Culture, Blood No Growth at 5 days    Blood culture (site 1) [[id number]] Collected: 01/09/22 1054    Order Status: Completed Specimen: Blood Updated: 01/15/22 0750     Culture, Blood No Growth at 5 days    Blood culture (site 2) [[id number]] Collected: 01/09/22 1100    Order Status: Completed Specimen: Blood Updated: 01/15/22 0750     Culture, Blood No Growth at 5 days          Diagnostic Results:  Chest x-ray images personally reviewed, similar bilateral patchy lung infiltrates and yesterday's x-ray  Labs: Reviewed    ASSESSMENT/PLAN:     Active Hospital Problems    Diagnosis  POA    *Denice  gangrene [N49.3]  Yes    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 Necrotizing fasciitis involving perineum and extending up into pelvis and abdomen admitted originally in mid December.  E coli along with several anaerobes including Clostridium species isolated during I&D.  Fungal organisms with the appearance of mucormycosis noted on histopathology.  2. Multi organ failure due to above, on mechanical ventilation, requiring hemodialysis  3. Recurring fever - looks like he has pneumonia, so far no evidence of bloodstream infection  4. History of diabetes        PLAN:  1. Continue amphotericin B  2. Continue Zosyn along with gentamicin  3. Follow-up results of sputum and blood cultures  4. Continue aggressive wound care

## 2022-01-18 NOTE — ASSESSMENT & PLAN NOTE
- nephrology consulted   - no sign of renal recovery.  Continuing with scheduled hemodialysis for this patient.  - tunneled HD line placement 12/30  Continues with dialysis  1/13 nephrology note reviewed and appreciated. No renal recovery. Continue with HD per nephrology    1/18-- continue HD

## 2022-01-18 NOTE — ASSESSMENT & PLAN NOTE
- 12/14 intubated, tracheostomy 1/4  Currently very ill have not been able to wean  1/11- remains very sick. Not able to tolerate any weaning trials. He is oxygenating adequately on 40%. Prognosis is poor.  1/13- no significant change. He is critically ill and not able to tolerate weaning trials currently   If I can get him off of pressors I will try some SIMV with reduced rate.  1/14- placed him on PSV while I was in the room and he did well. We will try 1 hour bid today  1/16- not doing well on PSV   01/17/2022 patient doing not doing well weaning from the ventilator  01/18/2022 patient has multiple problems ongoing is unable to be weaned from the vent currently

## 2022-01-18 NOTE — SUBJECTIVE & OBJECTIVE
Interval History: The patient's condition is about the same.  No other acute changes.    Review of patient's allergies indicates:  No Known Allergies  Current Facility-Administered Medications   Medication Frequency    0.9%  NaCl infusion (for blood administration) Q24H PRN    0.9%  NaCl infusion PRN    0.9%  NaCl infusion PRN    acetaminophen tablet 1,000 mg Q6H PRN    acetaminophen tablet 500 mg Q6H PRN    albuterol nebulizer solution 2.5 mg Q4H PRN    amphotericin B liposome (AMBISOME) 450 mg in dextrose 5 % 450 mL IVPB Q24H    bisacodyL EC tablet 10 mg Daily PRN    dextromethorphan-guaiFENesin  mg/5 ml liquid 10 mL Q6H PRN    dextrose 50% injection 12.5 g PRN    fentaNYL 2500 mcg in D5W 250 mL infusion premix (titrating) (conc: 10 mcg/mL) PRN    gentamicin - pharmacy to dose pharmacy to manage frequency    glucagon (human recombinant) injection 1 mg PRN    heparin (porcine) injection 4,000 Units PRN    heparin (porcine) injection 5,000 Units Q8H    insulin aspart U-100 injection 1-10 Units Q6H    insulin detemir U-100 injection 20 Units QHS    metoprolol injection 5 mg Q5 Min PRN    NORepinephrine 32 mg in dextrose 5 % 250 mL infusion PRN    ondansetron injection 8 mg Q6H PRN    pantoprazole suspension 40 mg Daily    piperacillin-tazobactam (ZOSYN) 4.5 g in dextrose 5 % in water (D5W) 5 % 100 mL IVPB (MB+) Q12H    sevelamer carbonate pwpk 0.8 g TID WM    simethicone chewable tablet 80 mg TID PRN    ticagrelor tablet 90 mg BID    traZODone tablet 50 mg Nightly PRN    vancomycin (VANCOCIN) 2,000 mg in dextrose 5 % 500 mL IVPB Once    vancomycin - pharmacy to dose pharmacy to manage frequency       Objective:     Vital Signs (Most Recent):  Temp: 99.1 °F (37.3 °C) (01/18/22 0718)  Pulse: 96 (01/18/22 1200)  Resp: 17 (01/18/22 1200)  BP: 135/69 (01/18/22 1200)  SpO2: 99 % (01/18/22 1200)  O2 Device (Oxygen Therapy): ventilator (01/18/22 0818) Vital Signs (24h Range):  Temp:  [98.6  °F (37 °C)-99.6 °F (37.6 °C)] 99.1 °F (37.3 °C)  Pulse:  [] 96  Resp:  [15-34] 17  SpO2:  [96 %-100 %] 99 %  BP: ()/(50-82) 135/69     Weight: 97 kg (213 lb 13.5 oz) (01/14/22 0600)  Body mass index is 29.83 kg/m².  Body surface area is 2.2 meters squared.    I/O last 3 completed shifts:  In: 4109 [I.V.:60; Blood:1659; Other:1030; NG/GT:1260; IV Piggyback:100]  Out: 3380 [Other:2980; Stool:400]    Physical Exam  Vitals reviewed.   Cardiovascular:      Rate and Rhythm: Regular rhythm.      Heart sounds: Normal heart sounds.   Pulmonary:      Breath sounds: Normal breath sounds.      Comments: Ventilated  Abdominal:      General: Bowel sounds are normal.      Palpations: Abdomen is soft.         Significant Labs:  BMP:   Recent Labs   Lab 01/18/22  0605   GLU 85   *   K 4.6   CL 97*   CO2 22   BUN 59*   CREATININE 3.34*   CALCIUM 7.1*   MG 2.2     CBC:   Recent Labs   Lab 01/18/22  0954   WBC 13.43*   RBC 2.61*   HGB 8.0*   HCT 22.7*      MCV 87.0   MCH 30.7   MCHC 35.2        Significant Imaging:  Labs: Reviewed

## 2022-01-19 NOTE — ASSESSMENT & PLAN NOTE
Dialysis Monday   Continue with scheduled hemodialysis for this patient.  1/5/2022 Dialysis done today.  No other changes  1/6/2022 Continue with dialysis on tomorrow.  1/7/2022  Dialysis as scheduled today.  And dialysis on tomorrow.  1/10/2022 Dialysis as scheduled.  1/11/2022 No signs of renal recovery.  Continue with dialysis management  1/12/2022 Continue with scheduled hemodialysis for this patient.  1/13/2022 Dialysis scheduled for tomorrow.  1/14/2022 Dialysis scheduled for Saturday 1/16/2022 Tolerated dialysis on Saturday.  Condition remains critical  1/17/2022  Continue with scheduled hemodialysis for this patient.  The situation remains critical.  1/18/2022 The patient's condition is the same.  1/19/2022 Dialysis as scheduled.  Volume status is stable.

## 2022-01-19 NOTE — ASSESSMENT & PLAN NOTE
Continue wound care  1/5/2022.  Continue with wound debridement  1/6/2022 Plans for further wound debridement  1/7/2022 Now s/p wound washout  1/10/2022 Continued would debridement  1/11/2021 Wound care management  1/14/2022  The patient's condition is the same.  Continuing with wound management  1/16/2022 Continue with wound management.  1/19/2022 Continue with wound care.

## 2022-01-19 NOTE — DISCHARGE SUMMARY
[facility]  Discharge Note       * No surgery found *     Admit Date: 12/13 /21  Discharge Date: 12/23/2021  Admission Dx: Denice's Gangrene  Discharge Dx: As above  Procedures Performed: Multiple abdominal washouts, diverting colostomy, debridement of rectum and abdomen    Outcome: Patient tolerated treatment/procedure well without complication and is now ready for discharge.     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  The infection spread up into his abdominal rectus sheath areas and in the retroperitoneum.  Patient require multiple debridements operations to control the infection as well as a colostomy.  He has been kept intubated for wound VAC changes at the bedside and will likely need to be kept intubated for least another week.  [facility] and will eventually be closed sometime next week.  Nephrology was also consulted for dialysis access which was placed in the right IJ.  A tunneled dialysis catheter will also likely be necessary shortly.  Patient was discharged in stable but guarded condition.        DISPOSITION: Skilled Nursing Facility     FINAL DIAGNOSIS:  <principal problem not specified> Denice's Gangrene     FOLLOWUP: In clinic     DISCHARGE INSTRUCTIONS:  No discharge procedures on file.      TIME SPENT ON DISCHARGE: 20 minutes

## 2022-01-19 NOTE — PROGRESS NOTES
Progress Note                                                           Infectious disease   Admit Date: 12/23/2021    SUBJECTIVE:     Follow-up For:  Denice gangrene    HPI/Interval history:  T-max 100.8° yesterday afternoon, no fever since then or other acute issue.    Review of Systems:    Unable to obtain as he is on the vent.      OBJECTIVE:     Vital Signs Range (Last 24H):  Temp:  [98 °F (36.7 °C)-100.8 °F (38.2 °C)]   Pulse:  []   Resp:  [16-41]   BP: ()/(41-89)   SpO2:  [76 %-100 %]     Physical Exam   Constitutional: Pt is sedated but arousable, looks ill.    Head: Normocephalic, scarring about right orbit from old injury.   Eyes, nose and throat:  Pale moist mucosa, anicteric and acyanotic, enucleated right eye, on reactive left pupil, unable to examine mouth as not able to get it open  Neck: Neck with tracheostomy, some yellowish-brown mucoid drainage around the tubing  Cardiovascular: Normal rate and regular rhythm.  S1 and S2 appreciated by ascultation, no murmurs  Pulmonary/Chest: Effort normal, no crepitations or wheezes auscultated   Abdomen:  Wound VAC to large midline wound, colostomy present, not distended, normal bowel sounds. Soft.  Musculoskeletal:  Photographs of groin and perineal wounds reviewed  Extremities:  Generalized body edema present.   Skin:  Blister to dorsum of right hand  Psychiatric:  Sedated but arousable    Laboratory:  CBC:   Recent Labs   Lab 01/18/22  0954   WBC 13.43*   RBC 2.61*   HGB 8.0*   HCT 22.7*      MCV 87.0   MCH 30.7   MCHC 35.2     BMP:   Recent Labs   Lab 01/18/22  0605   GLU 85   *   K 4.6   CL 97*   CO2 22   BUN 59*   CREATININE 3.34*   CALCIUM 7.1*   MG 2.2     CMP:   Recent Labs   Lab 01/18/22  0605   GLU 85   CALCIUM 7.1*   ALBUMIN 0.8*   PROT 5.6*   *   K 4.6   CO2 22   CL 97*   BUN 59*   CREATININE 3.34*   ALKPHOS 369*   ALT 39   AST 61*   BILITOT 0.4     Microbiology Results (last 7 days)     Procedure Component Value  Units Date/Time    Blood culture (site 2) [[id number]] Collected: 01/16/22 1729    Order Status: Completed Specimen: Blood Updated: 01/19/22 0746     Culture, Blood No Growth At 48 Hours    Verigene Gram-positive Blood Culture Test [[id number]]  (Abnormal) Collected: 01/16/22 1726    Order Status: Completed Specimen: Blood Updated: 01/18/22 1712     Verigene Result Methicillin Resistant Staphylococcus epidermidis     Blood culture (site 1) [[id number]]  (Abnormal) Collected: 01/16/22 1726    Order Status: Completed Specimen: Blood Updated: 01/18/22 1431     Gram Stain Result Gram positive cocci     Comment: From Aerobic Bottle  This is an appended report. These results have been appended to a previously preliminary verified report.       Culture, Lower Respiratory [[id number]]  (Abnormal) Collected: 01/17/22 1808    Order Status: Completed Specimen: Respiratory from Tracheal Aspirate Updated: 01/18/22 1043     Culture, Lower Respiratory Heavy Growth Gram-negative Bacilli     Comment: Identification and Susceptibility to Follow        Gram Stain Result Moderate WBC seen      Moderate Gram negative bacilli      Rare Gram positive bacilli      Rare Epithelial cells    Gram Stain [[id number]] Collected: 01/17/22 1808    Order Status: Canceled Specimen: Respiratory from Tracheal Aspirate Updated: 01/17/22 1928    Blood culture (site 1) [[id number]] Collected: 01/10/22 1539    Order Status: Completed Specimen: Blood Updated: 01/16/22 0806     Culture, Blood No Growth at 5 days    Blood culture (site 2) [[id number]] Collected: 01/10/22 1547    Order Status: Completed Specimen: Blood Updated: 01/16/22 0806     Culture, Blood No Growth at 5 days    Blood culture (site 1) [[id number]] Collected: 01/09/22 1054    Order Status: Completed Specimen: Blood Updated: 01/15/22 0750     Culture, Blood No Growth at 5 days    Blood culture (site 2) [[id number]] Collected: 01/09/22 1100    Order Status: Completed Specimen: Blood Updated: 01/15/22  0750     Culture, Blood No Growth at 5 days          Diagnostic Results:  Chest x-ray images personally reviewed, similar bilateral patchy lung infiltrates and yesterday's x-ray  Labs: Reviewed    ASSESSMENT/PLAN:     Active Hospital Problems    Diagnosis  POA    *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]  Yes    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 Necrotizing fasciitis involving perineum and extending up into pelvis and abdomen admitted originally in mid December.  E coli along with several anaerobes including Clostridium species isolated during I&D.  Fungal organisms with the appearance of mucormycosis noted on histopathology.  2. Multi organ failure due to above, on mechanical ventilation, requiring hemodialysis  3. Recurring fever - looks like he has pneumonia, so far no evidence of true bloodstream infection; he had MRSE in 1 of 2 sets of blood cultures on 01/16 which indicates contamination.  4. History of diabetes        PLAN:  1. Continue amphotericin B  2. Continue Zosyn along with gentamicin  3. Follow-up results of sputum and blood cultures  4.  I added vancomycin yesterday afternoon after the initial positive blood culture yesterday but will discontinue it today now that we have further results  5. Continue aggressive wound care

## 2022-01-19 NOTE — NURSING
2155 Pt pulse in the 150's Lopressor 5mg IVP given . Will follow up effectiveness.  2255 Pt pulse 100-120's now  0500  01/19/2022  rounding and pulse running 150's. Reported to him ;received new order

## 2022-01-19 NOTE — ASSESSMENT & PLAN NOTE
- s/p multiple debridements  - wound vac in place  - ID consulted for antibiotic management: He was treated with rocephin, daptomycin, clindamycin. 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 Amphotericin started on 1/7. The patient went to OR on 1/10 for debridement. Surgery note reviewed and appreciated.  1/12- back to OR today.  1/13- OR note reviewed. No purulence noted.    1/16- tmax 101.5 -- repeat BC   01/17/2022 low-grade temp cultures pending review antibiotics  01/19/2022 no change

## 2022-01-19 NOTE — SUBJECTIVE & OBJECTIVE
Interval History: Over the past few days, the patient has become much more awake and alert.  He will move his head and track with his eyes around the room.    Review of patient's allergies indicates:  No Known Allergies  Current Facility-Administered Medications   Medication Frequency    0.9%  NaCl infusion (for blood administration) Q24H PRN    0.9%  NaCl infusion PRN    0.9%  NaCl infusion PRN    acetaminophen tablet 1,000 mg Q6H PRN    acetaminophen tablet 500 mg Q6H PRN    albuterol nebulizer solution 2.5 mg Q4H PRN    amphotericin B liposome (AMBISOME) 450 mg in dextrose 5 % 450 mL IVPB Q24H    bisacodyL EC tablet 10 mg Daily PRN    dextromethorphan-guaiFENesin  mg/5 ml liquid 10 mL Q6H PRN    dextrose 50% injection 12.5 g PRN    diltiaZEM tablet 30 mg Q6H    fentaNYL 2500 mcg in D5W 250 mL infusion premix (titrating) (conc: 10 mcg/mL) PRN    gentamicin - pharmacy to dose pharmacy to manage frequency    glucagon (human recombinant) injection 1 mg PRN    heparin (porcine) injection 4,000 Units PRN    heparin (porcine) injection 5,000 Units Q8H    insulin aspart U-100 injection 1-10 Units Q6H    insulin detemir U-100 injection 20 Units QHS    metoprolol injection 5 mg Q5 Min PRN    NORepinephrine 32 mg in dextrose 5 % 250 mL infusion PRN    ondansetron injection 8 mg Q6H PRN    pantoprazole suspension 40 mg Daily    piperacillin-tazobactam (ZOSYN) 4.5 g in dextrose 5 % in water (D5W) 5 % 100 mL IVPB (MB+) Q12H    sevelamer carbonate pwpk 0.8 g TID WM    simethicone chewable tablet 80 mg TID PRN    ticagrelor tablet 90 mg BID    traZODone tablet 50 mg Nightly PRN    vancomycin - pharmacy to dose pharmacy to manage frequency       Objective:     Vital Signs (Most Recent):  Temp: 98.4 °F (36.9 °C) (01/19/22 0830)  Pulse: 103 (01/19/22 1140)  Resp: (!) 32 (01/19/22 1140)  BP: 114/62 (01/19/22 1140)  SpO2: 100 % (01/19/22 1140)  O2 Device (Oxygen Therapy): ventilator (01/19/22 1140)  Vital Signs (24h Range):  Temp:  [98 °F (36.7 °C)-100.8 °F (38.2 °C)] 98.4 °F (36.9 °C)  Pulse:  [] 103  Resp:  [16-41] 32  SpO2:  [76 %-100 %] 100 %  BP: ()/(41-85) 114/62     Weight: 101.5 kg (223 lb 12.3 oz) (01/19/22 0600)  Body mass index is 31.21 kg/m².  Body surface area is 2.25 meters squared.    I/O last 3 completed shifts:  In: 3160 [I.V.:250; Other:300; NG/GT:2510; IV Piggyback:100]  Out: 400 [Stool:400]    Physical Exam  Vitals reviewed.   HENT:      Head: Normocephalic.      Mouth/Throat:      Mouth: Mucous membranes are moist.   Cardiovascular:      Rate and Rhythm: Regular rhythm.      Heart sounds: Normal heart sounds.   Pulmonary:      Effort: Pulmonary effort is normal.      Breath sounds: Normal breath sounds.      Comments: Ventilated  Abdominal:      Palpations: Abdomen is soft.      Comments: Wound vac in place   Neurological:      Mental Status: He is alert.         Significant Labs:  BMP:   Recent Labs   Lab 01/18/22  0605   GLU 85   *   K 4.6   CL 97*   CO2 22   BUN 59*   CREATININE 3.34*   CALCIUM 7.1*   MG 2.2     CBC:   Recent Labs   Lab 01/18/22  0954   WBC 13.43*   RBC 2.61*   HGB 8.0*   HCT 22.7*      MCV 87.0   MCH 30.7   MCHC 35.2        Significant Imaging:  Labs: Reviewed

## 2022-01-19 NOTE — ASSESSMENT & PLAN NOTE
- nephrology consulted   - no sign of renal recovery.  Continuing with scheduled hemodialysis for this patient.  - tunneled HD line placement 12/30  Continues with dialysis  1/13 nephrology note reviewed and appreciated. No renal recovery. Continue with HD per nephrology    1/19-- continue HD

## 2022-01-19 NOTE — PROGRESS NOTES
[facility]  Pulmonology  Progress Note    Patient Name: Og Garcia  MRN: [id number]  Admission Date: 12/23/2021  Hospital Length of Stay: 27 days  Code Status: Prior  Attending Provider: Cecil Abernathy DO  Primary Care Provider: Hector Arndt, JULISSA, FNP-C   Principal Problem: Denice gangrene    Subjective:     No new subjective & objective note has been filed under this hospital service since the last note was generated.    Assessment/Plan:     * Denice gangrene  - s/p multiple debridements  - wound vac in place  - ID consulted for antibiotic management: He was treated with rocephin, daptomycin, clindamycin. 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 Amphotericin started on 1/7. The patient went to OR on 1/10 for debridement. Surgery note reviewed and appreciated.  1/12- back to OR today.  1/13- OR note reviewed. No purulence noted.    1/16- tmax 101.5 -- repeat BC   01/17/2022 low-grade temp cultures pending review antibiotics  01/19/2022 no change    On mechanically assisted ventilation  - 12/14 intubated, tracheostomy 1/4  Currently very ill have not been able to wean  1/11- remains very sick. Not able to tolerate any weaning trials. He is oxygenating adequately on 40%. Prognosis is poor.  1/13- no significant change. He is critically ill and not able to tolerate weaning trials currently   If I can get him off of pressors I will try some SIMV with reduced rate.  1/14- placed him on PSV while I was in the room and he did well. We will try 1 hour bid today  1/16- not  doing well on PSV   01/17/2022 patient doing not doing well weaning from the ventilator  01/18/2022 patient has multiple problems ongoing is unable to be weaned from the vent currently      RAHAT (acute kidney injury)  - nephrology consulted   - no sign of renal recovery.  Continuing with scheduled hemodialysis for this patient.  - tunneled HD line placement 12/30  Continues with dialysis  1/13 nephrology note reviewed and appreciated. No renal recovery. Continue with HD per nephrology    1/19-- continue HD         Hypertension  Currently hypotensive. Holding any antihypertensives  BP looks a little better this am. Weaning pressor as tolerated    Disseminated mucormycosis  Patient received 4.5 g of amphotericin liposomal will ask Dr. Hall look at the patient today    Necrotizing fasciitis  Ongoing issue                 Jose Urban MD  Pulmonology  [facility]

## 2022-01-19 NOTE — PROGRESS NOTES
[facility]  Nephrology  Progress Note    Patient Name: Og Garcia  MRN: [id number]  Admission Date: 12/23/2021  Hospital Length of Stay: 27 days  Attending Provider: Cecil Abernathy DO   Primary Care Physician: Hector Arndt DNP, FNP-C  Principal Problem:Denice gangrene    Subjective:     HPI: The patient is known from the previous nephrology consult at [facility].  He is no at Specialty and continues to need dialysis support.      Interval History: Over the past few days, the patient has become much more awake and alert.  He will move his head and track with his eyes around the room.    Review of patient's allergies indicates:  No Known Allergies  Current Facility-Administered Medications   Medication Frequency    0.9%  NaCl infusion (for blood administration) Q24H PRN    0.9%  NaCl infusion PRN    0.9%  NaCl infusion PRN    acetaminophen tablet 1,000 mg Q6H PRN    acetaminophen tablet 500 mg Q6H PRN    albuterol nebulizer solution 2.5 mg Q4H PRN    amphotericin B liposome (AMBISOME) 450 mg in dextrose 5 % 450 mL IVPB Q24H    bisacodyL EC tablet 10 mg Daily PRN    dextromethorphan-guaiFENesin  mg/5 ml liquid 10 mL Q6H PRN    dextrose 50% injection 12.5 g PRN    diltiaZEM tablet 30 mg Q6H    fentaNYL 2500 mcg in D5W 250 mL infusion premix (titrating) (conc: 10 mcg/mL) PRN    gentamicin - pharmacy to dose pharmacy to manage frequency    glucagon (human recombinant) injection 1 mg PRN    heparin (porcine) injection 4,000 Units PRN    heparin (porcine) injection 5,000 Units Q8H    insulin aspart U-100 injection 1-10 Units Q6H    insulin detemir U-100 injection 20 Units QHS    metoprolol injection 5 mg Q5 Min PRN    NORepinephrine 32 mg in dextrose 5 % 250 mL infusion PRN    ondansetron injection 8 mg Q6H PRN    pantoprazole suspension 40 mg Daily    piperacillin-tazobactam (ZOSYN) 4.5 g in dextrose 5 % in water (D5W) 5 % 100 mL IVPB (MB+) Q12H    sevelamer  carbonate pwpk 0.8 g TID WM    simethicone chewable tablet 80 mg TID PRN    ticagrelor tablet 90 mg BID    traZODone tablet 50 mg Nightly PRN    vancomycin - pharmacy to dose pharmacy to manage frequency       Objective:     Vital Signs (Most Recent):  Temp: 98.4 °F (36.9 °C) (01/19/22 0830)  Pulse: 103 (01/19/22 1140)  Resp: (!) 32 (01/19/22 1140)  BP: 114/62 (01/19/22 1140)  SpO2: 100 % (01/19/22 1140)  O2 Device (Oxygen Therapy): ventilator (01/19/22 1140) Vital Signs (24h Range):  Temp:  [98 °F (36.7 °C)-100.8 °F (38.2 °C)] 98.4 °F (36.9 °C)  Pulse:  [] 103  Resp:  [16-41] 32  SpO2:  [76 %-100 %] 100 %  BP: ()/(41-85) 114/62     Weight: 101.5 kg (223 lb 12.3 oz) (01/19/22 0600)  Body mass index is 31.21 kg/m².  Body surface area is 2.25 meters squared.    I/O last 3 completed shifts:  In: 3160 [I.V.:250; Other:300; NG/GT:2510; IV Piggyback:100]  Out: 400 [Stool:400]    Physical Exam  Vitals reviewed.   HENT:      Head: Normocephalic.      Mouth/Throat:      Mouth: Mucous membranes are moist.   Cardiovascular:      Rate and Rhythm: Regular rhythm.      Heart sounds: Normal heart sounds.   Pulmonary:      Effort: Pulmonary effort is normal.      Breath sounds: Normal breath sounds.      Comments: Ventilated  Abdominal:      Palpations: Abdomen is soft.      Comments: Wound vac in place   Neurological:      Mental Status: He is alert.         Significant Labs:  BMP:   Recent Labs   Lab 01/18/22  0605   GLU 85   *   K 4.6   CL 97*   CO2 22   BUN 59*   CREATININE 3.34*   CALCIUM 7.1*   MG 2.2     CBC:   Recent Labs   Lab 01/18/22  0954   WBC 13.43*   RBC 2.61*   HGB 8.0*   HCT 22.7*      MCV 87.0   MCH 30.7   MCHC 35.2        Significant Imaging:  Labs: Reviewed    Assessment/Plan:     * Denice gangrene  Continue wound care  1/5/2022.  Continue with wound debridement  1/6/2022 Plans for further wound debridement  1/7/2022 Now s/p wound washout  1/10/2022 Continued would  debridement  1/11/2021 Wound care management  1/14/2022  The patient's condition is the same.  Continuing with wound management  1/16/2022 Continue with wound management.  1/19/2022 Continue with wound care.    RAHAT (acute kidney injury)  Dialysis Monday   Continue with scheduled hemodialysis for this patient.  1/5/2022 Dialysis done today.  No other changes  1/6/2022 Continue with dialysis on tomorrow.  1/7/2022  Dialysis as scheduled today.  And dialysis on tomorrow.  1/10/2022 Dialysis as scheduled.  1/11/2022 No signs of renal recovery.  Continue with dialysis management  1/12/2022 Continue with scheduled hemodialysis for this patient.  1/13/2022 Dialysis scheduled for tomorrow.  1/14/2022 Dialysis scheduled for Saturday 1/16/2022 Tolerated dialysis on Saturday.  Condition remains critical  1/17/2022  Continue with scheduled hemodialysis for this patient.  The situation remains critical.  1/18/2022 The patient's condition is the same.  1/19/2022 Dialysis as scheduled.  Volume status is stable.        Thank you for your consult. I will follow-up with patient. Please contact us if you have any additional questions.    Edwin Pryor Jr, MD  Nephrology  [facility]

## 2022-01-20 NOTE — ASSESSMENT & PLAN NOTE
- s/p multiple debridements  - wound vac in place  - ID consulted for antibiotic management: He was treated with rocephin, daptomycin, clindamycin. 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 Amphotericin started on 1/7. The patient went to OR on 1/10 for debridement. Surgery note reviewed and appreciated.  1/12- back to OR today.  1/13- OR note reviewed. No purulence noted.    1/16- tmax 101.5 -- repeat BC   01/17/2022 low-grade temp cultures pending review antibiotics  01/20/2022 no change

## 2022-01-20 NOTE — PLAN OF CARE
Problem: Adult Inpatient Plan of Care  Goal: Plan of Care Review  Outcome: Ongoing, Progressing  Goal: Patient-Specific Goal (Individualized)  Outcome: Ongoing, Progressing

## 2022-01-20 NOTE — PROGRESS NOTES
[facility]  Nephrology  Progress Note    Patient Name: Og Garcia  MRN: [id number]  Admission Date: 12/23/2021  Hospital Length of Stay: 28 days  Attending Provider: Cecil Abernathy DO   Primary Care Physician: Hector Arndt DNP, FNP-C  Principal Problem:Denice gangrene    Subjective:     HPI: The patient is known from the previous nephrology consult at [facility].  He is no at Specialty and continues to need dialysis support.      Interval History: The patient is resting comfortably.  No other acute changes.    Review of patient's allergies indicates:  No Known Allergies  Current Facility-Administered Medications   Medication Frequency    0.9%  NaCl infusion (for blood administration) Q24H PRN    0.9%  NaCl infusion PRN    0.9%  NaCl infusion PRN    acetaminophen tablet 1,000 mg Q6H PRN    acetaminophen tablet 500 mg Q6H PRN    albuterol nebulizer solution 2.5 mg Q4H PRN    amphotericin B liposome (AMBISOME) 450 mg in dextrose 5 % 450 mL IVPB Q24H    bisacodyL EC tablet 10 mg Daily PRN    dextromethorphan-guaiFENesin  mg/5 ml liquid 10 mL Q6H PRN    dextrose 50% injection 12.5 g PRN    diltiaZEM tablet 30 mg Q6H    fentaNYL 2500 mcg in D5W 250 mL infusion premix (titrating) (conc: 10 mcg/mL) PRN    gentamicin - pharmacy to dose pharmacy to manage frequency    glucagon (human recombinant) injection 1 mg PRN    heparin (porcine) injection 4,000 Units PRN    heparin (porcine) injection 5,000 Units Q8H    insulin aspart U-100 injection 1-10 Units Q6H    insulin detemir U-100 injection 20 Units QHS    metoprolol injection 5 mg Q5 Min PRN    NORepinephrine 32 mg in dextrose 5 % 250 mL infusion PRN    ondansetron injection 8 mg Q6H PRN    pantoprazole suspension 40 mg Daily    piperacillin-tazobactam (ZOSYN) 4.5 g in dextrose 5 % in water (D5W) 5 % 100 mL IVPB (MB+) Q12H    sevelamer carbonate pwpk 0.8 g TID WM    simethicone chewable tablet 80 mg TID PRN     ticagrelor tablet 90 mg BID    traZODone tablet 50 mg Nightly PRN       Objective:     Vital Signs (Most Recent):  Temp: 98.1 °F (36.7 °C) (01/20/22 1600)  Pulse: 79 (01/20/22 1600)  Resp: 17 (01/20/22 1600)  BP: (!) 104/58 (01/20/22 1600)  SpO2: 100 % (01/20/22 1600)  O2 Device (Oxygen Therapy): ventilator (01/20/22 1434) Vital Signs (24h Range):  Temp:  [97.8 °F (36.6 °C)-98.4 °F (36.9 °C)] 98.1 °F (36.7 °C)  Pulse:  [] 79  Resp:  [9-32] 17  SpO2:  [94 %-100 %] 100 %  BP: (101-169)/() 104/58     Weight: 101.5 kg (223 lb 12.3 oz) (01/19/22 0600)  Body mass index is 31.21 kg/m².  Body surface area is 2.25 meters squared.    I/O last 3 completed shifts:  In: 3760 [Other:700; NG/GT:2510; IV Piggyback:550]  Out: 2211 [Other:1811; Stool:400]    Physical Exam  Vitals reviewed.   Cardiovascular:      Rate and Rhythm: Regular rhythm.      Heart sounds: Normal heart sounds.   Pulmonary:      Breath sounds: Normal breath sounds.   Abdominal:      Palpations: Abdomen is soft.         Significant Labs:  BMP:   Recent Labs   Lab 01/18/22  0605   GLU 85   *   K 4.6   CL 97*   CO2 22   BUN 59*   CREATININE 3.34*   CALCIUM 7.1*   MG 2.2     CBC:   Recent Labs   Lab 01/18/22  0954   WBC 13.43*   RBC 2.61*   HGB 8.0*   HCT 22.7*      MCV 87.0   MCH 30.7   MCHC 35.2        Significant Imaging:  Labs: Reviewed    Assessment/Plan:     RAHAT (acute kidney injury)  Dialysis Monday   Continue with scheduled hemodialysis for this patient.  1/5/2022 Dialysis done today.  No other changes  1/6/2022 Continue with dialysis on tomorrow.  1/7/2022  Dialysis as scheduled today.  And dialysis on tomorrow.  1/10/2022 Dialysis as scheduled.  1/11/2022 No signs of renal recovery.  Continue with dialysis management  1/12/2022 Continue with scheduled hemodialysis for this patient.  1/13/2022 Dialysis scheduled for tomorrow.  1/14/2022 Dialysis scheduled for Saturday 1/16/2022 Tolerated dialysis on Saturday.  Condition remains  critical  1/17/2022  Continue with scheduled hemodialysis for this patient.  The situation remains critical.  1/18/2022 The patient's condition is the same.  1/19/2022 Dialysis as scheduled.  Volume status is stable.  1/20/2022 Continue current management        Thank you for your consult. I will follow-up with patient. Please contact us if you have any additional questions.    Edwin Pryor Jr, MD  Nephrology  [facility]

## 2022-01-20 NOTE — PROGRESS NOTES
[facility]  Adult Nutrition  Follow-up Note         Reason for Assessment  Reason For Assessment: RD follow-up  Nutrition Risk Screen: tube feeding or parenteral nutrition  Malnutrition  Is Patient Malnourished: No  Nutrition Diagnosis  Inadequate oral intake   related to Decreased ability to consume sufficient energy as evidenced by pt on vent    Nutrition Diagnosis Status: Chronic/ continues      Nutrition Risk  Level of Risk/Frequency of Follow-up: high   Chewing or Swallowing Difficulty?: No Chewing or swallowing difficulty  Estimated/Assessed Needs  RMR ([place]-St. Jeor Equation): 1817.13 Activity Factor: 1 Injury Factor: 1.2   Total Ve: 12.1 mL Temp: 97.8 °F (36.6 °C)Axillary  Weight Used For Calorie Calculations: 101.5 kg (223 lb 12.3 oz)   Energy Need Method: [place] (modified) Energy Calorie Requirements (kcal): 2068  Weight Used For Protein Calculations: 81 kg (178 lb 9.2 oz) (adjusted body wt)  Protein Requirements:   Estimated Fluid Requirement Method: RDA Method Fluid Requirements (mL): 2065  RDA Method (mL): 2068     Nutrition Prescription / Recommendations  Recommendation/Intervention: Vital AF @ 75ml/hr; H20 flush of 60ml q2hr  Goals: pt will tolerate tube feeding: pt will meet estimated nutritional needs  Nutrition Goal Status: goal met  Communication of RD Recs: reviewed with physician  Current Diet Order: Vital AF @ 75ml/hr; H20 flush of 60ml q2hr  Nutrition Order Comments: TF: Vital AF @ 75ml/hr; H20 flush of 60ml q2hr  Current Nutrition Support Formula Ordered: Vital AF 1.2  Current Nutrition Support Rate Ordered: 75 (ml)  Current Nutrition Support Frequency Ordered: hourly  Recommended Diet: Enteral Nutrition Vital AF @ 75ml/hr; H20 flush of 60ml q2hr  Recommended Oral Supplement: No Oral Supplements  Is Nutrition Support Recommended: No  Is Education Recommended: No  Monitor and Evaluation  % current Intake: Enteral Nutrition at goal  % intake to meet estimated  needs: Enteral Nutrition   Food and Nutrient Intake: enteral nutrition intake  Food and Nutrient Adminstration: enteral and parenteral nutrition administration  Anthropometric Measurements: height/length,weight,body mass index,weight change  Biochemical Data, Medical Tests and Procedures: electrolyte and renal panel,glucose/endocrine profile  Nutrition-Focused Physical Findings: skin  Enteral Calories (kcal): 2160  Enteral Protein (gm): 135  Enteral (Free Water) Fluid (mL): 1458  Free Water Flush Fluid (mL): 720  Parenteral Calories (kcal): 845  Parenteral Protein (gm): 48  Parenteral Fluid (mL): 960  Lipid Calories (kcals): 500 kcals  Other Calories (kcal): 528 (propofol)  Total Calories (kcal): 2160  Total Calories (kcal/kg): 2612  % Kcal Needs: 100  Total Protein (gm): 135  % Protein Needs: 100  IV Fluid (mL): 1764  Total Fluid Intake (mL): 2178  Energy Calories Required: meeting needs  Protein Required: meeting needs  Fluid Required: meeting needs  Tolerance: tolerating  Current Medical Diagnosis and Past Medical History  Diagnosis: gastrointestinal disease,infection/sepsis  Past Medical History:   Diagnosis Date    Hyperlipidemia     Hypertension      Nutrition/Diet History  Spiritual, Cultural Beliefs, Jehovah's witness Practices, Values that Affect Care: no  Food Allergies: NKFA  Factors Affecting Nutritional Intake: None identified at this time  Lab/Procedures/Meds  Recent Labs   Lab 01/18/22  0605   *   K 4.6   BUN 59*   CREATININE 3.34*   GLU 85   CALCIUM 7.1*   ALBUMIN 0.8*   CL 97*   ALT 39   AST 61*     Last A1c: No results found for: HGBA1C  Lab Results   Component Value Date    RBC 2.61 (L) 01/18/2022    HGB 8.0 (L) 01/18/2022    HCT 22.7 (L) 01/18/2022    MCV 87.0 01/18/2022    MCH 30.7 01/18/2022    MCHC 35.2 01/18/2022     Pertinent Labs Reviewed: reviewed  Pertinent Labs Comments: Hct 22.7, BUN 59, Creat 3.34, Alb .8, Na 132  Pertinent Medications Reviewed: reviewed  Pertinent Medications  "Comments: heparin, insulin  Anthropometrics  Temp: 97.8 °F (36.6 °C)  Height Method: Stated  Height: 5' 11" (180.3 cm)  Height (inches): 71 in  Weight Method: Bed Scale  Weight: 101.5 kg (223 lb 12.3 oz)  Weight (lb): 223.77 lb  Ideal Body Weight (IBW), Male: 172 lb  % Ideal Body Weight, Male (lb): 115.36 %  BMI (Calculated): 31.2  BMI Grade: 25 - 29.9 - overweight     Nutrition by Nursing  Diet/Nutrition Received: tube feeding     Diet/Feeding Assistance: none  Diet/Feeding Tolerance: fair  Last Bowel Movement: 01/20/22       NG/OG Tube [place] sump 18 Fr. Left nostril-Feeding Type: continuous,by pump       NG/OG Tube [place] sump 18 Fr. Left nostril-Current Rate (mL/hr): 75 mL/hr       NG/OG Tube [place] sump 18 Fr. Left nostril-Goal Rate (mL/hr): 75 mL/hr       NG/OG Tube [place] sump 18 Fr. Left nostril-Formula Name: Vital   Nutrition Follow-Up  RD Follow-up?: Yes  Assessment and Plan  Assessment & plan notes cannot be loaded without a specified hospital service.     "

## 2022-01-20 NOTE — RESPIRATORY THERAPY
Vent Type: VC   Vent Mode: A/C   Rate 16   Tidal Volume 480   PEEP 5   FIO2 40   I-Time: 0     0808- place pt on SIMV; rate of 6; ps of 15.  0945- place pt back on previous settings; pt tolerated trial well.    1221- place pt on SIMV; rate of 6; ps of 15.  1434- place pt back on previous settings; pt tolerated trial well.

## 2022-01-20 NOTE — ASSESSMENT & PLAN NOTE
- 12/14 intubated, tracheostomy 1/4  Currently very ill have not been able to wean  1/11- remains very sick. Not able to tolerate any weaning trials. He is oxygenating adequately on 40%. Prognosis is poor.  1/13- no significant change. He is critically ill and not able to tolerate weaning trials currently   If I can get him off of pressors I will try some SIMV with reduced rate.  1/14- placed him on PSV while I was in the room and he did well. We will try 1 hour bid today  1/16- not doing well on PSV   01/17/2022 patient doing not doing well weaning from the ventilator  01/18/2022 patient has multiple problems ongoing is unable to be weaned from the vent currently  01/20/2022 we will attempt some weaning from the ventilator

## 2022-01-20 NOTE — ASSESSMENT & PLAN NOTE
- H/H down to 5.5/16 - pt had bleeding from his HD insertion site yesterday this has now resolved.   - will transfuse 2 units PRBCs on HD  1/2- 9.7/27.5 - repeat CBC in AM  1/4- Hgb is 7.2. Not sure if he will be transfused during surgery. IF not we will try to get  Him transfused with next HD  01/06/2021 transfuse 1 unit of packed red blood cells today  01/10/2021 needs a unit of blood  1/11- repeat H&H in am. Continue to transfuse as needed.  1/13- Hgb is 5. Transfusing today. Post transfusion H&H  1/14- CBC pending for today  1/15- 6/18 - hemodynamically stable, recheck in AM   1/16-- 6.1/17.1 - critical low blood shortage in hospital - holding transfusion until next HD since he is hemodynamically stable and not actively bleeding   01/17/2021 day transfuse blood on dialysis today  01/18/2020 to hemoglobin up to 9 after transfusion  01/20/2022 no signs of bleeding hemoglobin 8

## 2022-01-20 NOTE — SUBJECTIVE & OBJECTIVE
Interval History:  Seems more alert    Objective:     Vital Signs (Most Recent):  Temp: 98.4 °F (36.9 °C) (01/20/22 0400)  Pulse: 99 (01/20/22 0400)  Resp: 16 (01/20/22 0400)  BP: 137/75 (01/20/22 0633)  SpO2: 100 % (01/20/22 0400) Vital Signs (24h Range):  Temp:  [97.6 °F (36.4 °C)-98.5 °F (36.9 °C)] 98.4 °F (36.9 °C)  Pulse:  [] 99  Resp:  [16-32] 16  SpO2:  [93 %-100 %] 100 %  BP: ()/() 137/75     Weight: 101.5 kg (223 lb 12.3 oz)  Body mass index is 31.21 kg/m².      Intake/Output Summary (Last 24 hours) at 1/20/2022 0634  Last data filed at 1/20/2022 0600  Gross per 24 hour   Intake 2510 ml   Output 1811 ml   Net 699 ml       Physical Exam  Vitals reviewed.   Constitutional:       Appearance: He is ill-appearing.      Interventions: He is intubated.   HENT:      Head: Normocephalic and atraumatic.      Nose: Nose normal.      Mouth/Throat:      Mouth: Mucous membranes are dry.      Pharynx: Oropharynx is clear.   Eyes:      Extraocular Movements: Extraocular movements intact.      Conjunctiva/sclera: Conjunctivae normal.      Pupils: Pupils are equal, round, and reactive to light.   Cardiovascular:      Rate and Rhythm: Normal rate.      Heart sounds: Normal heart sounds. No murmur heard.      Pulmonary:      Effort: Pulmonary effort is normal. He is intubated.      Breath sounds: Normal breath sounds.   Abdominal:      General: Abdomen is flat. Bowel sounds are normal.      Palpations: Abdomen is soft.   Musculoskeletal:         General: Normal range of motion.      Cervical back: Normal range of motion and neck supple.      Right lower leg: No edema.      Left lower leg: No edema.   Skin:     General: Skin is warm and dry.      Capillary Refill: Capillary refill takes less than 2 seconds.   Neurological:      General: No focal deficit present.         Vents:  Vent Mode: A/C (01/20/22 0330)  Set Rate: 16 BPM (01/20/22 0330)  Vt Set: 480 mL (01/20/22 0330)  Pressure Support: 12 cmH20 (01/16/22  0810)  PEEP/CPAP: 5 cmH20 (01/20/22 0330)  Oxygen Concentration (%): 40 (01/20/22 0355)  Peak Airway Pressure: 31 cmH2O (01/20/22 0330)  Total Ve: 5.7 mL (01/20/22 0330)  F/VT Ratio<105 (RSBI): (!) 79.45 (01/19/22 0800)    Lines/Drains/Airways     Peripherally Inserted Central Catheter Line            PICC Double Lumen 01/05/22 left basilic 15 days          Central Venous Catheter Line                 Hemodialysis Catheter 12/30/21 0900 right internal jugular 20 days          Drain                 NG/OG Tube [place] sump 18 Fr. Left nostril -- days         Colostomy 12/18/21 1030 Descending/sigmoid LUQ 32 days          Airway                 Surgical Airway 01/04/22 [place] 16 days          Peripheral Intravenous Line                 Peripheral IV - Single Lumen 01/14/22 1835 18 G Anterior;Left;Proximal Forearm 5 days                Significant Labs:    CBC/Anemia Profile:  Recent Labs   Lab 01/18/22  0954   WBC 13.43*   HGB 8.0*   HCT 22.7*      MCV 87.0   RDW 15.6*        Chemistries:  No results for input(s): NA, K, CL, CO2, BUN, CREATININE, CALCIUM, ALBUMIN, PROT, BILITOT, ALKPHOS, ALT, AST, GLUCOSE, MG, PHOS in the last 48 hours.    All pertinent labs within the past 24 hours have been reviewed.    Significant Imaging:  I have reviewed all pertinent imaging results/findings within the past 24 hours.

## 2022-01-20 NOTE — PLAN OF CARE
Problem: Adult Inpatient Plan of Care  Goal: Plan of Care Review  Outcome: Ongoing, Progressing     Problem: Glycemic Control Impaired (Sepsis/Septic Shock)  Goal: Blood Glucose Level Within Desired Range  Outcome: Ongoing, Progressing     Problem: Infection Progression (Sepsis/Septic Shock)  Goal: Absence of Infection Signs and Symptoms  Outcome: Ongoing, Progressing     Problem: Infection  Goal: Absence of Infection Signs and Symptoms  Outcome: Ongoing, Progressing     Problem: Infection (Hemodialysis)  Goal: Absence of Infection Signs and Symptoms  Outcome: Ongoing, Progressing

## 2022-01-20 NOTE — SUBJECTIVE & OBJECTIVE
Interval History: The patient is resting comfortably.  No other acute changes.    Review of patient's allergies indicates:  No Known Allergies  Current Facility-Administered Medications   Medication Frequency    0.9%  NaCl infusion (for blood administration) Q24H PRN    0.9%  NaCl infusion PRN    0.9%  NaCl infusion PRN    acetaminophen tablet 1,000 mg Q6H PRN    acetaminophen tablet 500 mg Q6H PRN    albuterol nebulizer solution 2.5 mg Q4H PRN    amphotericin B liposome (AMBISOME) 450 mg in dextrose 5 % 450 mL IVPB Q24H    bisacodyL EC tablet 10 mg Daily PRN    dextromethorphan-guaiFENesin  mg/5 ml liquid 10 mL Q6H PRN    dextrose 50% injection 12.5 g PRN    diltiaZEM tablet 30 mg Q6H    fentaNYL 2500 mcg in D5W 250 mL infusion premix (titrating) (conc: 10 mcg/mL) PRN    gentamicin - pharmacy to dose pharmacy to manage frequency    glucagon (human recombinant) injection 1 mg PRN    heparin (porcine) injection 4,000 Units PRN    heparin (porcine) injection 5,000 Units Q8H    insulin aspart U-100 injection 1-10 Units Q6H    insulin detemir U-100 injection 20 Units QHS    metoprolol injection 5 mg Q5 Min PRN    NORepinephrine 32 mg in dextrose 5 % 250 mL infusion PRN    ondansetron injection 8 mg Q6H PRN    pantoprazole suspension 40 mg Daily    piperacillin-tazobactam (ZOSYN) 4.5 g in dextrose 5 % in water (D5W) 5 % 100 mL IVPB (MB+) Q12H    sevelamer carbonate pwpk 0.8 g TID WM    simethicone chewable tablet 80 mg TID PRN    ticagrelor tablet 90 mg BID    traZODone tablet 50 mg Nightly PRN       Objective:     Vital Signs (Most Recent):  Temp: 98.1 °F (36.7 °C) (01/20/22 1600)  Pulse: 79 (01/20/22 1600)  Resp: 17 (01/20/22 1600)  BP: (!) 104/58 (01/20/22 1600)  SpO2: 100 % (01/20/22 1600)  O2 Device (Oxygen Therapy): ventilator (01/20/22 3524) Vital Signs (24h Range):  Temp:  [97.8 °F (36.6 °C)-98.4 °F (36.9 °C)] 98.1 °F (36.7 °C)  Pulse:  [] 79  Resp:  [9-32] 17  SpO2:  [94  %-100 %] 100 %  BP: (101-169)/() 104/58     Weight: 101.5 kg (223 lb 12.3 oz) (01/19/22 0600)  Body mass index is 31.21 kg/m².  Body surface area is 2.25 meters squared.    I/O last 3 completed shifts:  In: 3760 [Other:700; NG/GT:2510; IV Piggyback:550]  Out: 2211 [Other:1811; Stool:400]    Physical Exam  Vitals reviewed.   Cardiovascular:      Rate and Rhythm: Regular rhythm.      Heart sounds: Normal heart sounds.   Pulmonary:      Breath sounds: Normal breath sounds.   Abdominal:      Palpations: Abdomen is soft.         Significant Labs:  BMP:   Recent Labs   Lab 01/18/22  0605   GLU 85   *   K 4.6   CL 97*   CO2 22   BUN 59*   CREATININE 3.34*   CALCIUM 7.1*   MG 2.2     CBC:   Recent Labs   Lab 01/18/22  0954   WBC 13.43*   RBC 2.61*   HGB 8.0*   HCT 22.7*      MCV 87.0   MCH 30.7   MCHC 35.2        Significant Imaging:  Labs: Reviewed

## 2022-01-20 NOTE — PROGRESS NOTES
Progress Note                                                           Infectious disease   Admit Date: 12/23/2021    SUBJECTIVE:     Follow-up For:  Denice gangrene    HPI/Interval history:  T-max 98.4 so fever curve down.  Has been having arrhythmias, alternating between severe bradycardia and severe tachycardia.  Blood pressure has been stable.  Still has a lot of endotracheal secretions.    Review of Systems:    Unable to obtain as he is on the vent.      OBJECTIVE:     Vital Signs Range (Last 24H):  Temp:  [97.6 °F (36.4 °C)-98.4 °F (36.9 °C)]   Pulse:  []   Resp:  [12-32]   BP: (105-169)/(57-88)   SpO2:  [93 %-100 %]     Physical Exam   Constitutional: Pt is arousable, looks ill.    Head: Normocephalic, scarring about right orbit from old injury.   Eyes, nose and throat:  Pale moist mucosa, anicteric and acyanotic, enucleated right eye, unable to examine mouth as not able to get it open  Neck: Neck with tracheostomy, some yellowish-brown mucoid drainage around the tubing  Cardiovascular: Normal rate and regular rhythm.  S1 and S2 appreciated by ascultation, no murmurs  Pulmonary/Chest: Effort normal, no crepitations or wheezes auscultated   Abdomen:  Wound VAC to large midline wound, colostomy present, not distended, normal bowel sounds. Soft.  Extremities:  Generalized body edema present.   Skin:  Blister to dorsum of right hand    Laboratory:  CBC:   Recent Labs   Lab 01/18/22  0954   WBC 13.43*   RBC 2.61*   HGB 8.0*   HCT 22.7*      MCV 87.0   MCH 30.7   MCHC 35.2     BMP:   Recent Labs   Lab 01/18/22  0605   GLU 85   *   K 4.6   CL 97*   CO2 22   BUN 59*   CREATININE 3.34*   CALCIUM 7.1*   MG 2.2     CMP:   Recent Labs   Lab 01/18/22  0605   GLU 85   CALCIUM 7.1*   ALBUMIN 0.8*   PROT 5.6*   *   K 4.6   CO2 22   CL 97*   BUN 59*   CREATININE 3.34*   ALKPHOS 369*   ALT 39   AST 61*   BILITOT 0.4     Microbiology Results (last 7 days)     Procedure Component Value Units Date/Time     Culture, Lower Respiratory [[id number]]  (Abnormal)  (Susceptibility) Collected: 01/17/22 1808    Order Status: Completed Specimen: Respiratory from Tracheal Aspirate Updated: 01/20/22 0917     Culture, Lower Respiratory Heavy Growth Pseudomonas aeruginosa     Gram Stain Result Moderate WBC seen      Moderate Gram negative bacilli      Rare Gram positive bacilli      Rare Epithelial cells    Blood culture (site 2) [[id number]] Collected: 01/16/22 1729    Order Status: Completed Specimen: Blood Updated: 01/20/22 0836     Culture, Blood No Growth At 72 Hours    Blood culture (site 1) [[id number]]  (Abnormal)  (Susceptibility) Collected: 01/16/22 1726    Order Status: Completed Specimen: Blood Updated: 01/20/22 0758     Culture, Blood Staphylococcus epidermidis     Gram Stain Result Gram positive cocci     Comment: From Aerobic Bottle  This is an appended report. These results have been appended to a previously preliminary verified report.       Narrative:      Site # 1, aerobic and anaerobic  Avoid lines if possible  Site # 1, aerobic and anaerobic  Avoid lines if possible    Verigene Gram-positive Blood Culture Test [[id number]]  (Abnormal) Collected: 01/16/22 1726    Order Status: Completed Specimen: Blood Updated: 01/18/22 1712     Verigene Result Methicillin Resistant Staphylococcus epidermidis     Gram Stain [[id number]] Collected: 01/17/22 1808    Order Status: Canceled Specimen: Respiratory from Tracheal Aspirate Updated: 01/17/22 1928    Blood culture (site 1) [[id number]] Collected: 01/10/22 1539    Order Status: Completed Specimen: Blood Updated: 01/16/22 0806     Culture, Blood No Growth at 5 days    Blood culture (site 2) [[id number]] Collected: 01/10/22 1547    Order Status: Completed Specimen: Blood Updated: 01/16/22 0806     Culture, Blood No Growth at 5 days    Blood culture (site 1) [[id number]] Collected: 01/09/22 1054    Order Status: Completed Specimen: Blood Updated: 01/15/22 0750     Culture, Blood No  Growth at 5 days    Blood culture (site 2) [[id number]] Collected: 01/09/22 1100    Order Status: Completed Specimen: Blood Updated: 01/15/22 0750     Culture, Blood No Growth at 5 days          Diagnostic Results:  Chest x-ray images personally reviewed, similar bilateral patchy lung infiltrates and yesterday's x-ray  Labs: Reviewed    ASSESSMENT/PLAN:     Active Hospital Problems    Diagnosis  POA    *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]  Yes    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 Necrotizing fasciitis involving perineum and extending up into pelvis and abdomen admitted originally in mid December.  Infection is polymicrobial including several bacteria along with mucor spp.  2. Multi organ failure due to above, on mechanical ventilation, requiring hemodialysis  3. Recurring fever - likely due to vent associated pneumonia.  E coli isolated.  No evidence of bacteremia (he had MRSE in 1 of 2 sets of blood cultures on 01/16 which indicates contamination).  4. History of diabetes        PLAN:  1. Continue amphotericin B  2. Continue Zosyn along with gentamicin for double coverage of Pseudomonas  3. Continue aggressive wound care  Overall prognosis poor

## 2022-01-20 NOTE — ASSESSMENT & PLAN NOTE
Dialysis Monday   Continue with scheduled hemodialysis for this patient.  1/5/2022 Dialysis done today.  No other changes  1/6/2022 Continue with dialysis on tomorrow.  1/7/2022  Dialysis as scheduled today.  And dialysis on tomorrow.  1/10/2022 Dialysis as scheduled.  1/11/2022 No signs of renal recovery.  Continue with dialysis management  1/12/2022 Continue with scheduled hemodialysis for this patient.  1/13/2022 Dialysis scheduled for tomorrow.  1/14/2022 Dialysis scheduled for Saturday 1/16/2022 Tolerated dialysis on Saturday.  Condition remains critical  1/17/2022  Continue with scheduled hemodialysis for this patient.  The situation remains critical.  1/18/2022 The patient's condition is the same.  1/19/2022 Dialysis as scheduled.  Volume status is stable.  1/20/2022 Continue current management

## 2022-01-20 NOTE — ASSESSMENT & PLAN NOTE
- nephrology consulted   - no sign of renal recovery.  Continuing with scheduled hemodialysis for this patient.  - tunneled HD line placement 12/30  Continues with dialysis  1/13 nephrology note reviewed and appreciated. No renal recovery. Continue with HD per nephrology    1/20-- continue HD

## 2022-01-20 NOTE — PLAN OF CARE
Problem: Communication Impairment (Mechanical Ventilation, Invasive)  Goal: Effective Communication  Outcome: Ongoing, Progressing     Problem: Device-Related Complication Risk (Mechanical Ventilation, Invasive)  Goal: Optimal Device Function  Outcome: Ongoing, Progressing     Problem: Nutrition Impairment (Mechanical Ventilation, Invasive)  Goal: Optimal Nutrition Delivery  Outcome: Ongoing, Progressing     Problem: Skin and Tissue Injury (Mechanical Ventilation, Invasive)  Goal: Absence of Device-Related Skin and Tissue Injury  Outcome: Ongoing, Progressing     Problem: Ventilator-Induced Lung Injury (Mechanical Ventilation, Invasive)  Goal: Absence of Ventilator-Induced Lung Injury  Outcome: Ongoing, Progressing     Problem: Communication Impairment (Artificial Airway)  Goal: Effective Communication  Outcome: Ongoing, Progressing     Problem: Device-Related Complication Risk (Artificial Airway)  Goal: Optimal Device Function  Outcome: Ongoing, Progressing     Problem: Skin and Tissue Injury (Artificial Airway)  Goal: Absence of Device-Related Skin or Tissue Injury  Outcome: Ongoing, Progressing

## 2022-01-20 NOTE — PROGRESS NOTES
[facility]  Pulmonology  Progress Note    Patient Name: Og Garcia  MRN: [id number]  Admission Date: 12/23/2021  Hospital Length of Stay: 28 days  Code Status: Prior  Attending Provider: Cecil Abernathy DO  Primary Care Provider: Hector Arndt DNP, FNP-C   Principal Problem: Denice gangrene    Subjective:     Interval History:  Seems more alert    Objective:     Vital Signs (Most Recent):  Temp: 98.4 °F (36.9 °C) (01/20/22 0400)  Pulse: 99 (01/20/22 0400)  Resp: 16 (01/20/22 0400)  BP: 137/75 (01/20/22 0633)  SpO2: 100 % (01/20/22 0400) Vital Signs (24h Range):  Temp:  [97.6 °F (36.4 °C)-98.5 °F (36.9 °C)] 98.4 °F (36.9 °C)  Pulse:  [] 99  Resp:  [16-32] 16  SpO2:  [93 %-100 %] 100 %  BP: ()/() 137/75     Weight: 101.5 kg (223 lb 12.3 oz)  Body mass index is 31.21 kg/m².      Intake/Output Summary (Last 24 hours) at 1/20/2022 0634  Last data filed at 1/20/2022 0600  Gross per 24 hour   Intake 2510 ml   Output 1811 ml   Net 699 ml       Physical Exam  Vitals reviewed.   Constitutional:       Appearance: He is ill-appearing.      Interventions: He is intubated.   HENT:      Head: Normocephalic and atraumatic.      Nose: Nose normal.      Mouth/Throat:      Mouth: Mucous membranes are dry.      Pharynx: Oropharynx is clear.   Eyes:      Extraocular Movements: Extraocular movements intact.      Conjunctiva/sclera: Conjunctivae normal.      Pupils: Pupils are equal, round, and reactive to light.   Cardiovascular:      Rate and Rhythm: Normal rate.      Heart sounds: Normal heart sounds. No murmur heard.      Pulmonary:      Effort: Pulmonary effort is normal. He is intubated.      Breath sounds: Normal breath sounds.   Abdominal:      General: Abdomen is flat. Bowel sounds are normal.      Palpations: Abdomen is soft.   Musculoskeletal:         General: Normal range of motion.      Cervical back: Normal range of motion and neck supple.      Right lower leg: No  edema.      Left lower leg: No edema.   Skin:     General: Skin is warm and dry.      Capillary Refill: Capillary refill takes less than 2 seconds.   Neurological:      General: No focal deficit present.         Vents:  Vent Mode: A/C (01/20/22 0330)  Set Rate: 16 BPM (01/20/22 0330)  Vt Set: 480 mL (01/20/22 0330)  Pressure Support: 12 cmH20 (01/16/22 0810)  PEEP/CPAP: 5 cmH20 (01/20/22 0330)  Oxygen Concentration (%): 40 (01/20/22 0355)  Peak Airway Pressure: 31 cmH2O (01/20/22 0330)  Total Ve: 5.7 mL (01/20/22 0330)  F/VT Ratio<105 (RSBI): (!) 79.45 (01/19/22 0800)    Lines/Drains/Airways     Peripherally Inserted Central Catheter Line            PICC Double Lumen 01/05/22 left basilic 15 days          Central Venous Catheter Line                 Hemodialysis Catheter 12/30/21 0900 right internal jugular 20 days          Drain                 NG/OG Tube [place] sump 18 Fr. Left nostril -- days         Colostomy 12/18/21 1030 Descending/sigmoid LUQ 32 days          Airway                 Surgical Airway 01/04/22 Shiley 16 days          Peripheral Intravenous Line                 Peripheral IV - Single Lumen 01/14/22 1835 18 G Anterior;Left;Proximal Forearm 5 days                Significant Labs:    CBC/Anemia Profile:  Recent Labs   Lab 01/18/22  0954   WBC 13.43*   HGB 8.0*   HCT 22.7*      MCV 87.0   RDW 15.6*        Chemistries:  No results for input(s): NA, K, CL, CO2, BUN, CREATININE, CALCIUM, ALBUMIN, PROT, BILITOT, ALKPHOS, ALT, AST, GLUCOSE, MG, PHOS in the last 48 hours.    All pertinent labs within the past 24 hours have been reviewed.    Significant Imaging:  I have reviewed all pertinent imaging results/findings within the past 24 hours.    Assessment/Plan:     * Denice gangrene  - s/p multiple debridements  - wound vac in place  - ID consulted for antibiotic management: He was treated with rocephin, daptomycin, clindamycin. 12/21- change clindamycin to ampicillin and treat for another week  -  remains on ampicillin, this was changed to merrem 1/9  - pathology with fungal species consistent with mucormycosis initiated on amphotericin- mucormycosis with up to 50% mortality rate  - plan for OR tomorrow  Patient receiving amphotericin having spells of bradycardia will repeat blood cultures and do a echo to look for endocarditis  1/11- echo reviewed and no obvious vegetations. Amphotericin started on 1/7. The patient went to OR on 1/10 for debridement. Surgery note reviewed and appreciated.  1/12- back to OR today.  1/13- OR note reviewed. No purulence noted.    1/16- tmax 101.5 -- repeat BC   01/17/2022 low-grade temp cultures pending review antibiotics  01/20/2022 no change    On mechanically assisted ventilation  - 12/14 intubated, tracheostomy 1/4  Currently very ill have not been able to wean  1/11- remains very sick. Not able to tolerate any weaning trials. He is oxygenating adequately on 40%. Prognosis is poor.  1/13- no significant change. He is critically ill and not able to tolerate weaning trials currently   If I can get him off of pressors I will try some SIMV with reduced rate.  1/14- placed him on PSV while I was in the room and he did well. We will try 1 hour bid today  1/16- not doing well on PSV   01/17/2022 patient doing not doing well weaning from the ventilator  01/18/2022 patient has multiple problems ongoing is unable to be weaned from the vent currently  01/20/2022 we will attempt some weaning from the ventilator      RAHAT (acute kidney injury)  - nephrology consulted   - no sign of renal recovery.  Continuing with scheduled hemodialysis for this patient.  - tunneled HD line placement 12/30  Continues with dialysis  1/13 nephrology note reviewed and appreciated. No renal recovery. Continue with HD per nephrology    1/20-- continue HD         Disseminated mucormycosis  Defer to Dr. Hall on all antibiotic choices    Type 2 diabetes mellitus without complication, without long-term  current use of insulin  - continue basal/bolus insulin  -accuchecks are reasonable currently      Stable     Acute blood loss anemia  - H/H down to 5.5/16 - pt had bleeding from his HD insertion site yesterday this has now resolved.   - will transfuse 2 units PRBCs on HD  1/2- 9.7/27.5 - repeat CBC in AM  1/4- Hgb is 7.2. Not sure if he will be transfused during surgery. IF not we will try to get  Him transfused with next HD  01/06/2021 transfuse 1 unit of packed red blood cells today  01/10/2021 needs a unit of blood  1/11- repeat H&H in am. Continue to transfuse as needed.  1/13- Hgb is 5. Transfusing today. Post transfusion H&H  1/14- CBC pending for today  1/15- 6/18 - hemodynamically stable, recheck in AM   1/16-- 6.1/17.1 - critical low blood shortage in hospital - holding transfusion until next HD since he is hemodynamically stable and not actively bleeding   01/17/2021 day transfuse blood on dialysis today  01/18/2020 to hemoglobin up to 9 after transfusion  01/20/2022 no signs of bleeding hemoglobin 8                 Jose Urban MD  Pulmonology  [facility]

## 2022-01-21 NOTE — PLAN OF CARE
Problem: Adult Inpatient Plan of Care  Goal: Plan of Care Review  Outcome: Ongoing, Progressing     Problem: Infection Progression (Sepsis/Septic Shock)  Goal: Absence of Infection Signs and Symptoms  Outcome: Ongoing, Progressing     Problem: Infection  Goal: Absence of Infection Signs and Symptoms  Outcome: Ongoing, Progressing     Problem: Fall Injury Risk  Goal: Absence of Fall and Fall-Related Injury  Outcome: Ongoing, Progressing

## 2022-01-21 NOTE — SUBJECTIVE & OBJECTIVE
Interval History: The patient is resting.  He is tolerating dialysis.  No other changes.    Review of patient's allergies indicates:  No Known Allergies  Current Facility-Administered Medications   Medication Frequency    0.9%  NaCl infusion (for blood administration) Q24H PRN    0.9%  NaCl infusion PRN    0.9%  NaCl infusion PRN    acetaminophen tablet 1,000 mg Q6H PRN    acetaminophen tablet 500 mg Q6H PRN    albuterol nebulizer solution 2.5 mg Q4H PRN    amphotericin B liposome (AMBISOME) 450 mg in dextrose 5 % 450 mL IVPB Q24H    bisacodyL EC tablet 10 mg Daily PRN    dextromethorphan-guaiFENesin  mg/5 ml liquid 10 mL Q6H PRN    dextrose 50% injection 12.5 g PRN    diltiaZEM tablet 30 mg Q6H    fentaNYL 2500 mcg in D5W 250 mL infusion premix (titrating) (conc: 10 mcg/mL) PRN    gentamicin - pharmacy to dose pharmacy to manage frequency    glucagon (human recombinant) injection 1 mg PRN    heparin (porcine) injection 4,000 Units PRN    heparin (porcine) injection 5,000 Units Q8H    insulin aspart U-100 injection 1-10 Units Q6H    insulin detemir U-100 injection 20 Units QHS    metoprolol injection 5 mg Q5 Min PRN    NORepinephrine 32 mg in dextrose 5 % 250 mL infusion PRN    ondansetron injection 8 mg Q6H PRN    pantoprazole suspension 40 mg Daily    piperacillin-tazobactam (ZOSYN) 4.5 g in dextrose 5 % in water (D5W) 5 % 100 mL IVPB (MB+) Q12H    sevelamer carbonate pwpk 0.8 g TID WM    simethicone chewable tablet 80 mg TID PRN    ticagrelor tablet 90 mg BID    traZODone tablet 50 mg Nightly PRN       Objective:     Vital Signs (Most Recent):  Temp: 97.8 °F (36.6 °C) (01/21/22 0815)  Pulse: 78 (01/21/22 0930)  Resp: 20 (01/21/22 0930)  BP: 122/66 (01/21/22 0930)  SpO2: 99 % (01/21/22 0715)  O2 Device (Oxygen Therapy): ventilator (01/21/22 0400) Vital Signs (24h Range):  Temp:  [97.8 °F (36.6 °C)-98.4 °F (36.9 °C)] 97.8 °F (36.6 °C)  Pulse:  [] 78  Resp:  [9-29] 20  SpO2:   [96 %-100 %] 99 %  BP: (101-172)/() 122/66     Weight: 101.5 kg (223 lb 12.3 oz) (01/19/22 0600)  Body mass index is 31.21 kg/m².  Body surface area is 2.25 meters squared.    I/O last 3 completed shifts:  In: 3170 [NG/GT:2520; IV Piggyback:650]  Out: 100 [Stool:100]    Physical Exam  Vitals reviewed.   HENT:      Mouth/Throat:      Mouth: Mucous membranes are dry.   Cardiovascular:      Rate and Rhythm: Regular rhythm.      Heart sounds: Normal heart sounds.   Pulmonary:      Effort: Pulmonary effort is normal.      Comments: ventliated  Abdominal:      Palpations: Abdomen is soft.   Neurological:      Mental Status: He is alert.         Significant Labs:  BMP:   Recent Labs   Lab 01/18/22  0605   GLU 85   *   K 4.6   CL 97*   CO2 22   BUN 59*   CREATININE 3.34*   CALCIUM 7.1*   MG 2.2     CBC:   Recent Labs   Lab 01/18/22  0954   WBC 13.43*   RBC 2.61*   HGB 8.0*   HCT 22.7*      MCV 87.0   MCH 30.7   MCHC 35.2        Significant Imaging:  Labs: Reviewed

## 2022-01-21 NOTE — PROGRESS NOTES
[facility]  Pulmonology  Progress Note    Patient Name: Og Garcia  MRN: [id number]  Admission Date: 12/23/2021  Hospital Length of Stay: 29 days  Code Status: Prior  Attending Provider: Cecil Abernathy DO  Primary Care Provider: Hector Arndt DNP, FNP-C   Principal Problem: Denice gangrene    Subjective:     Interval History: No acute events overnight. Currently hemodynamically stable. Oxygenating adequately on the ventilator. HD today. Currently afebrile.    Objective:     Vital Signs (Most Recent):  Temp: 97.8 °F (36.6 °C) (01/21/22 0815)  Pulse: 78 (01/21/22 0930)  Resp: 20 (01/21/22 0930)  BP: 122/66 (01/21/22 0930)  SpO2: 99 % (01/21/22 0715) Vital Signs (24h Range):  Temp:  [97.8 °F (36.6 °C)-98.4 °F (36.9 °C)] 97.8 °F (36.6 °C)  Pulse:  [] 78  Resp:  [9-29] 20  SpO2:  [96 %-100 %] 99 %  BP: (101-172)/() 122/66     Weight: 101.5 kg (223 lb 12.3 oz)  Body mass index is 31.21 kg/m².      Intake/Output Summary (Last 24 hours) at 1/21/2022 1046  Last data filed at 1/21/2022 0600  Gross per 24 hour   Intake 1360 ml   Output 100 ml   Net 1260 ml       Physical Exam  Vitals reviewed.   Constitutional:       General: He is not in acute distress.     Appearance: He is ill-appearing.   HENT:      Head: Normocephalic and atraumatic.      Comments: Right temporal deformity     Right Ear: External ear normal.      Left Ear: External ear normal.      Nose: Nose normal.      Mouth/Throat:      Mouth: Mucous membranes are moist.      Comments: trach  Eyes:      Conjunctiva/sclera: Conjunctivae normal.      Pupils: Pupils are equal, round, and reactive to light.   Cardiovascular:      Rate and Rhythm: Regular rhythm. Tachycardia present.      Pulses: Normal pulses.      Heart sounds: Normal heart sounds.   Pulmonary:      Effort: No respiratory distress.      Comments: clear breath sounds anteriorly  Abdominal:      Palpations: Abdomen is soft.   Musculoskeletal:          General: Normal range of motion.      Cervical back: Neck supple.      Right lower leg: Edema present.      Left lower leg: Edema present.   Lymphadenopathy:      Cervical: No cervical adenopathy.   Skin:     General: Skin is warm and dry.   Neurological:      Cranial Nerves: No cranial nerve deficit.      Comments: Remains on mechanical ventilation. Will track at times         Vents:  Vent Mode: SIMV (01/21/22 0430)  Set Rate: 6 BPM (01/21/22 0430)  Vt Set: 480 mL (01/21/22 0400)  Pressure Support: 15 cmH20 (01/21/22 0430)  PEEP/CPAP: 5 cmH20 (01/21/22 0430)  Oxygen Concentration (%): 40 (01/21/22 0730)  Peak Airway Pressure: 17 cmH2O (01/21/22 0400)  Total Ve: 9 mL (01/21/22 0400)  F/VT Ratio<105 (RSBI): (!) 50.75 (01/21/22 0400)    Lines/Drains/Airways     Peripherally Inserted Central Catheter Line            PICC Double Lumen 01/05/22 left basilic 16 days          Central Venous Catheter Line                 Hemodialysis Catheter 12/30/21 0900 right internal jugular 22 days          Drain                 NG/OG Tube [place] sump 18 Fr. Left nostril -- days         Colostomy 12/18/21 1030 Descending/sigmoid LUQ 34 days          Airway                 Surgical Airway 01/04/22 Shiley 17 days          Peripheral Intravenous Line                 Peripheral IV - Single Lumen 01/14/22 1835 18 G Anterior;Left;Proximal Forearm 6 days                Significant Labs:    CBC/Anemia Profile:  No results for input(s): WBC, HGB, HCT, PLT, MCV, RDW, IRON, FERRITIN, RETIC, FOLATE, [id number], OCCULTBLOOD in the last 48 hours.     Chemistries:  No results for input(s): NA, K, CL, CO2, BUN, CREATININE, CALCIUM, ALBUMIN, PROT, BILITOT, ALKPHOS, ALT, AST, GLUCOSE, MG, PHOS in the last 48 hours.    All pertinent labs within the past 24 hours have been reviewed.    Significant Imaging:  I have reviewed all pertinent imaging results/findings within the past 24 hours.    Assessment/Plan:     * Denice gangrene  - s/p multiple  debridements  - wound vac in place  - ID consulted for antibiotic management: He was treated with rocephin, daptomycin, clindamycin. 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 Amphotericin started on 1/7. The patient went to OR on 1/10 for debridement. Surgery note reviewed and appreciated.  1/12- back to OR today.  1/13- OR note reviewed. No purulence noted.    1/16- tmax 101.5 -- repeat BC   01/17/2022 low-grade temp cultures pending review antibiotics  01/20/2022 no change    Disseminated mucormycosis  Defer to Dr. Hall on all antibiotic choices---Her note has been reviewed and is appreciated. On amphotericin, zosyn, and gent    Type 2 diabetes mellitus without complication, without long-term current use of insulin  - continue basal/bolus insulin  -accuchecks are reasonable currently      Stable - accuchecks are ok    Acute blood loss anemia  - H/H down to 5.5/16 - pt had bleeding from his HD insertion site yesterday this has now resolved.   - will transfuse 2 units PRBCs on HD  1/2- 9.7/27.5 - repeat CBC in AM  1/4- Hgb is 7.2. Not sure if he will be transfused during surgery. IF not we will try to get  Him transfused with next HD  01/06/2021 transfuse 1 unit of packed red blood cells today  01/10/2021 needs a unit of blood  1/11- repeat H&H in am. Continue to transfuse as needed.  1/13- Hgb is 5. Transfusing today. Post transfusion H&H  1/14- CBC pending for today  1/15- 6/18 - hemodynamically stable, recheck in AM   1/16-- 6.1/17.1 - critical low blood shortage in hospital - holding transfusion until  next HD since he is hemodynamically stable and not actively bleeding   01/17/2021 day transfuse blood on dialysis today  01/18/2020 to hemoglobin up to 9 after transfusion  01/20/2022 no signs of bleeding hemoglobin 8    RAHAT (acute kidney injury)  - nephrology consulted   - no sign of renal recovery.  Continuing with scheduled hemodialysis for this patient.  - tunneled HD line placement 12/30  Continues with dialysis  1/13 nephrology note reviewed and appreciated. No renal recovery. Continue with HD per nephrology    1/21-- HD today        On mechanically assisted ventilation  - 12/14 intubated, tracheostomy 1/4  Currently very ill have not been able to wean  1/11- remains very sick. Not able to tolerate any weaning trials. He is oxygenating adequately on 40%. Prognosis is poor.  1/13- no significant change. He is critically ill and not able to tolerate weaning trials currently   If I can get him off of pressors I will try some SIMV with reduced rate.  1/14- placed him on PSV while I was in the room and he did well. We will try 1 hour bid today  1/16- not doing well on PSV   01/17/2022 patient doing not doing well weaning from the ventilator  01/18/2022 patient has multiple problems ongoing is unable to be weaned from the vent currently  01/20/2022 we will attempt some weaning from the ventilator  1/21- FiO2 40% and peep of 5. Continue to wean as able      Hypertension  Currently hypotensive. Holding any antihypertensives  BP looks a little better this am. Weaning pressor as tolerated  1/21- No longer on pressor.                  Cecil Abernathy, DO  Pulmonology  [facility]

## 2022-01-21 NOTE — ASSESSMENT & PLAN NOTE
- 12/14 intubated, tracheostomy 1/4  Currently very ill have not been able to wean  1/11- remains very sick. Not able to tolerate any weaning trials. He is oxygenating adequately on 40%. Prognosis is poor.  1/13- no significant change. He is critically ill and not able to tolerate weaning trials currently   If I can get him off of pressors I will try some SIMV with reduced rate.  1/14- placed him on PSV while I was in the room and he did well. We will try 1 hour bid today  1/16- not doing well on PSV   01/17/2022 patient doing not doing well weaning from the ventilator  01/18/2022 patient has multiple problems ongoing is unable to be weaned from the vent currently  01/20/2022 we will attempt some weaning from the ventilator  1/21- FiO2 40% and peep of 5. Continue to wean as able

## 2022-01-21 NOTE — SUBJECTIVE & OBJECTIVE
Interval History: No acute events overnight. Currently hemodynamically stable. Oxygenating adequately on the ventilator. HD today. Currently afebrile.    Objective:     Vital Signs (Most Recent):  Temp: 97.8 °F (36.6 °C) (01/21/22 0815)  Pulse: 78 (01/21/22 0930)  Resp: 20 (01/21/22 0930)  BP: 122/66 (01/21/22 0930)  SpO2: 99 % (01/21/22 0715) Vital Signs (24h Range):  Temp:  [97.8 °F (36.6 °C)-98.4 °F (36.9 °C)] 97.8 °F (36.6 °C)  Pulse:  [] 78  Resp:  [9-29] 20  SpO2:  [96 %-100 %] 99 %  BP: (101-172)/() 122/66     Weight: 101.5 kg (223 lb 12.3 oz)  Body mass index is 31.21 kg/m².      Intake/Output Summary (Last 24 hours) at 1/21/2022 1046  Last data filed at 1/21/2022 0600  Gross per 24 hour   Intake 1360 ml   Output 100 ml   Net 1260 ml       Physical Exam  Vitals reviewed.   Constitutional:       General: He is not in acute distress.     Appearance: He is ill-appearing.   HENT:      Head: Normocephalic and atraumatic.      Comments: Right temporal deformity     Right Ear: External ear normal.      Left Ear: External ear normal.      Nose: Nose normal.      Mouth/Throat:      Mouth: Mucous membranes are moist.      Comments: trach  Eyes:      Conjunctiva/sclera: Conjunctivae normal.      Pupils: Pupils are equal, round, and reactive to light.   Cardiovascular:      Rate and Rhythm: Regular rhythm. Tachycardia present.      Pulses: Normal pulses.      Heart sounds: Normal heart sounds.   Pulmonary:      Effort: No respiratory distress.      Comments: clear breath sounds anteriorly  Abdominal:      Palpations: Abdomen is soft.   Musculoskeletal:         General: Normal range of motion.      Cervical back: Neck supple.      Right lower leg: Edema present.      Left lower leg: Edema present.   Lymphadenopathy:      Cervical: No cervical adenopathy.   Skin:     General: Skin is warm and dry.   Neurological:      Cranial Nerves: No cranial nerve deficit.      Comments: Remains on mechanical ventilation.  Will track at times         Vents:  Vent Mode: SIMV (01/21/22 0430)  Set Rate: 6 BPM (01/21/22 0430)  Vt Set: 480 mL (01/21/22 0400)  Pressure Support: 15 cmH20 (01/21/22 0430)  PEEP/CPAP: 5 cmH20 (01/21/22 0430)  Oxygen Concentration (%): 40 (01/21/22 0730)  Peak Airway Pressure: 17 cmH2O (01/21/22 0400)  Total Ve: 9 mL (01/21/22 0400)  F/VT Ratio<105 (RSBI): (!) 50.75 (01/21/22 0400)    Lines/Drains/Airways     Peripherally Inserted Central Catheter Line            PICC Double Lumen 01/05/22 left basilic 16 days          Central Venous Catheter Line                 Hemodialysis Catheter 12/30/21 0900 right internal jugular 22 days          Drain                 NG/OG Tube [place] sump 18 Fr. Left nostril -- days         Colostomy 12/18/21 1030 Descending/sigmoid LUQ 34 days          Airway                 Surgical Airway 01/04/22 Shiley 17 days          Peripheral Intravenous Line                 Peripheral IV - Single Lumen 01/14/22 1835 18 G Anterior;Left;Proximal Forearm 6 days                Significant Labs:    CBC/Anemia Profile:  No results for input(s): WBC, HGB, HCT, PLT, MCV, RDW, IRON, FERRITIN, RETIC, FOLATE, [id number], OCCULTBLOOD in the last 48 hours.     Chemistries:  No results for input(s): NA, K, CL, CO2, BUN, CREATININE, CALCIUM, ALBUMIN, PROT, BILITOT, ALKPHOS, ALT, AST, GLUCOSE, MG, PHOS in the last 48 hours.    All pertinent labs within the past 24 hours have been reviewed.    Significant Imaging:  I have reviewed all pertinent imaging results/findings within the past 24 hours.

## 2022-01-21 NOTE — ASSESSMENT & PLAN NOTE
- continue basal/bolus insulin  -accuchecks are reasonable currently      Stable - accuchecks are ok

## 2022-01-21 NOTE — PROGRESS NOTES
[facility]  General Surgery  Progress Note    Subjective:     Interval History:  No new events he has been tolerating his dressing changes this week with no issues.  Wound VAC in place.  Rectal area also healing well with good granulation tissue    Post-Op Info:  * No surgery found *          Medications:  Continuous Infusions:  Scheduled Meds:   amphotericin B liposome (AMBISOME) IVPB  5 mg/kg Intravenous Q24H    diltiaZEM  30 mg Oral Q6H    heparin (porcine)  5,000 Units Subcutaneous Q8H    insulin aspart U-100  1-10 Units Subcutaneous Q6H    insulin detemir U-100  20 Units Subcutaneous QHS    pantoprazole  40 mg Per NG tube Daily    piperacillin-tazobactam (ZOSYN) IVPB  4.5 g Intravenous Q12H    sevelamer carbonate  0.8 g Per NG tube TID WM    ticagrelor  90 mg Per NG tube BID     PRN Meds:sodium chloride, sodium chloride 0.9%, sodium chloride 0.9%, acetaminophen, acetaminophen, albuterol sulfate, bisacodyL, dextromethorphan-guaiFENesin  mg/5 ml, dextrose 50%, fentanyl, gentamicin - pharmacy to dose, glucagon (human recombinant), heparin (porcine), metoprolol, NORepinephrine bitartrate-D5W, ondansetron, simethicone, traZODone     Objective:     Vital Signs (Most Recent):  Temp: 97.2 °F (36.2 °C) (01/21/22 1200)  Pulse: 74 (01/21/22 1200)  Resp: 18 (01/21/22 1200)  BP: 118/72 (01/21/22 1200)  SpO2: 99 % (01/21/22 0715) Vital Signs (24h Range):  Temp:  [97.2 °F (36.2 °C)-98.4 °F (36.9 °C)] 97.2 °F (36.2 °C)  Pulse:  [] 74  Resp:  [9-29] 18  SpO2:  [96 %-100 %] 99 %  BP: (101-172)/() 118/72       Intake/Output Summary (Last 24 hours) at 1/21/2022 1224  Last data filed at 1/21/2022 1132  Gross per 24 hour   Intake 1360 ml   Output 2600 ml   Net -1240 ml       Physical Exam  Constitutional:       General: He is not in acute distress.     Appearance: He is ill-appearing.   HENT:      Head: Normocephalic.   Cardiovascular:      Rate and Rhythm: Normal rate and regular  rhythm.      Pulses: Normal pulses.   Pulmonary:      Effort: Pulmonary effort is normal. No respiratory distress.      Breath sounds: Normal breath sounds.   Abdominal:      General: Abdomen is flat. There is no distension.      Palpations: Abdomen is soft.      Tenderness: There is no abdominal tenderness.   Musculoskeletal:         General: Swelling present. Normal range of motion.   Skin:     General: Skin is warm.   Neurological:      Mental Status: Mental status is at baseline.     Wound VAC in place with good granulation tissue on the abdomen area.    Significant Labs:  ABGs: No results for input(s): PH, PCO2, PO2, HCO3, POCSATURATED, BE in the last 48 hours.  Amylase: No results for input(s): AMYLASE in the last 48 hours.  CBC: No results for input(s): WBC, RBC, HGB, HCT, PLT, MCV, MCH, MCHC in the last 48 hours.  CMP: No results for input(s): GLU, CALCIUM, ALBUMIN, PROT, NA, K, CO2, CL, BUN, CREATININE, ALKPHOS, ALT, AST, BILITOT in the last 48 hours.    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] 01/17/2022 Unknown    Ventilator associated pneumonia [J95.851] 01/09/2022 No    Hyperphosphatemia [E83.39] 01/07/2022 No    Acute blood loss anemia [D62] 12/25/2021 No    Type 2 diabetes mellitus without complication, without long-term current use of insulin [E11.9] 12/25/2021 Yes    Necrotizing fasciitis [M72.6]  Yes    Hypocalcemia [E83.51] 12/16/2021 Yes    RAHAT (acute kidney injury) [N17.9] 12/14/2021 Yes    On mechanically assisted ventilation [Z99.11] 12/14/2021 Not Applicable    Hypertension [I10] 09/02/2021 Yes      Problems Resolved During this Admission:    Diagnosis Date Noted Date Resolved POA    Shock [R57.9] 01/08/2022 01/16/2022 Yes     Will attempt placing a wound VAC to the rectal area and continue wound VAC to the  abdomen.    Harish Cazares MD  General Surgery  [facility]

## 2022-01-21 NOTE — ASSESSMENT & PLAN NOTE
Currently hypotensive. Holding any antihypertensives  BP looks a little better this am. Weaning pressor as tolerated  1/21- No longer on pressor.

## 2022-01-21 NOTE — ASSESSMENT & PLAN NOTE
Dialysis Monday   Continue with scheduled hemodialysis for this patient.  1/5/2022 Dialysis done today.  No other changes  1/6/2022 Continue with dialysis on tomorrow.  1/7/2022  Dialysis as scheduled today.  And dialysis on tomorrow.  1/10/2022 Dialysis as scheduled.  1/11/2022 No signs of renal recovery.  Continue with dialysis management  1/12/2022 Continue with scheduled hemodialysis for this patient.  1/13/2022 Dialysis scheduled for tomorrow.  1/14/2022 Dialysis scheduled for Saturday 1/16/2022 Tolerated dialysis on Saturday.  Condition remains critical  1/17/2022  Continue with scheduled hemodialysis for this patient.  The situation remains critical.  1/18/2022 The patient's condition is the same.  1/19/2022 Dialysis as scheduled.  Volume status is stable.  1/20/2022 Continue current management  1/21/2022 Continue with dialysis.

## 2022-01-21 NOTE — PROGRESS NOTES
Progress Note                                                           Infectious disease   Admit Date: 12/23/2021    SUBJECTIVE:     Follow-up For:  Denice gangrene    HPI/Interval history:  T-max 98.4 so afebrile for over 48 hours.  Has been having arrhythmias, alternating between severe bradycardia and severe tachycardia but maybe a little less today so far.  Blood pressure remains stable.  Still has a lot of endotracheal secretions.    Review of Systems:    Unable to obtain as he is on the vent.      OBJECTIVE:     Vital Signs Range (Last 24H):  Temp:  [97.2 °F (36.2 °C)-98.4 °F (36.9 °C)]   Pulse:  []   Resp:  [9-29]   BP: (101-172)/()   SpO2:  [96 %-100 %]     Physical Exam   Constitutional: Pt is more alert today, appears to be making eye contact but still looks ill.    Head: Normocephalic, scarring about right orbit from old injury.   Eyes, nose and throat:  Pale moist mucosa, anicteric and acyanotic, enucleated right eye, unable to examine mouth as not able to get it open  Neck: Neck with tracheostomy, some yellowish-brown mucoid drainage around the tubing  Cardiovascular: Normal rate and regular rhythm.  S1 and S2 appreciated by ascultation, no murmurs  Pulmonary/Chest: Effort normal, no crepitations or wheezes auscultated   Abdomen:  Wound VAC to large midline wound - reviewed photos with wound care nurse and wound looks much better mostly healthy pink granulating tissue, colostomy present, not distended, normal bowel sounds.  Wounds in groin/buttock regions also better.  Extremities:  Generalized body edema present.   Skin:  Blister to dorsum of right hand    Laboratory:  CBC:   Recent Labs   Lab 01/18/22  0954   WBC 13.43*   RBC 2.61*   HGB 8.0*   HCT 22.7*      MCV 87.0   MCH 30.7   MCHC 35.2     BMP:   Recent Labs   Lab 01/18/22  0605   GLU 85   *   K 4.6   CL 97*   CO2 22   BUN 59*   CREATININE 3.34*   CALCIUM 7.1*   MG 2.2     CMP:   Recent Labs   Lab 01/18/22  0605   GLU  85   CALCIUM 7.1*   ALBUMIN 0.8*   PROT 5.6*   *   K 4.6   CO2 22   CL 97*   BUN 59*   CREATININE 3.34*   ALKPHOS 369*   ALT 39   AST 61*   BILITOT 0.4     Microbiology Results (last 7 days)     Procedure Component Value Units Date/Time    Blood culture (site 2) [[id number]] Collected: 01/16/22 1729    Order Status: Completed Specimen: Blood Updated: 01/21/22 0945     Culture, Blood No Growth At 72 Hours    Culture, Lower Respiratory [[id number]]  (Abnormal)  (Susceptibility) Collected: 01/17/22 1808    Order Status: Completed Specimen: Respiratory from Tracheal Aspirate Updated: 01/20/22 0917     Culture, Lower Respiratory Heavy Growth Pseudomonas aeruginosa     Gram Stain Result Moderate WBC seen      Moderate Gram negative bacilli      Rare Gram positive bacilli      Rare Epithelial cells    Blood culture (site 1) [[id number]]  (Abnormal)  (Susceptibility) Collected: 01/16/22 1726    Order Status: Completed Specimen: Blood Updated: 01/20/22 0758     Culture, Blood Staphylococcus epidermidis     Gram Stain Result Gram positive cocci     Comment: From Aerobic Bottle  This is an appended report. These results have been appended to a previously preliminary verified report.       Narrative:      Site # 1, aerobic and anaerobic  Avoid lines if possible  Site # 1, aerobic and anaerobic  Avoid lines if possible    Verigene Gram-positive Blood Culture Test [[id number]]  (Abnormal) Collected: 01/16/22 1726    Order Status: Completed Specimen: Blood Updated: 01/18/22 1712     Verigene Result Methicillin Resistant Staphylococcus epidermidis     Gram Stain [[id number]] Collected: 01/17/22 1808    Order Status: Canceled Specimen: Respiratory from Tracheal Aspirate Updated: 01/17/22 1928    Blood culture (site 1) [[id number]] Collected: 01/10/22 1539    Order Status: Completed Specimen: Blood Updated: 01/16/22 0806     Culture, Blood No Growth at 5 days    Blood culture (site 2) [[id number]] Collected: 01/10/22 1547    Order  Status: Completed Specimen: Blood Updated: 01/16/22 0806     Culture, Blood No Growth at 5 days    Blood culture (site 1) [[id number]] Collected: 01/09/22 1054    Order Status: Completed Specimen: Blood Updated: 01/15/22 0750     Culture, Blood No Growth at 5 days    Blood culture (site 2) [[id number]] Collected: 01/09/22 1100    Order Status: Completed Specimen: Blood Updated: 01/15/22 0750     Culture, Blood No Growth at 5 days          Diagnostic Results:  Chest x-ray images personally reviewed, similar bilateral patchy lung infiltrates and yesterday's x-ray  Labs: Reviewed    ASSESSMENT/PLAN:     Active Hospital Problems    Diagnosis  POA    *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]  Yes    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 Necrotizing fasciitis involving perineum and extending up into pelvis and abdomen admitted originally in mid December.  Infection is polymicrobial including several bacteria along with mucor spp.  2. Multi organ failure due to above, on mechanical ventilation, requiring hemodialysis  3. Recurring fever - likely due to vent associated pneumonia.  E coli isolated.  No evidence of bacteremia (he had MRSE in 1 of 2 sets of blood cultures on 01/16 which indicates contamination).  4. History of diabetes        PLAN:  1. Continue amphotericin B; she given extent of his wound and the type of yeast we are dealing with he likely needs this for at least 4 weeks  2.  Continue Zosyn along with gentamicin for double coverage of Pseudomonas for several more days  3. Continue aggressive wound care

## 2022-01-21 NOTE — PROGRESS NOTES
Placed on SIMV rate 6, PS 15 trial.    0526 - Returned to previous vent settings. Pt. Tolerated rate of 6 trial well.

## 2022-01-21 NOTE — PROGRESS NOTES
[facility]  Nephrology  Progress Note    Patient Name: Og Garcia  MRN: [id number]  Admission Date: 12/23/2021  Hospital Length of Stay: 29 days  Attending Provider: Cecil Abernathy DO   Primary Care Physician: Hector Arndt DNP, FNP-C  Principal Problem:Denice gangrene    Subjective:     HPI: The patient is known from the previous nephrology consult at [facility].  He is no at Specialty and continues to need dialysis support.      Interval History: The patient is resting.  He is tolerating dialysis.  No other changes.    Review of patient's allergies indicates:  No Known Allergies  Current Facility-Administered Medications   Medication Frequency    0.9%  NaCl infusion (for blood administration) Q24H PRN    0.9%  NaCl infusion PRN    0.9%  NaCl infusion PRN    acetaminophen tablet 1,000 mg Q6H PRN    acetaminophen tablet 500 mg Q6H PRN    albuterol nebulizer solution 2.5 mg Q4H PRN    amphotericin B liposome (AMBISOME) 450 mg in dextrose 5 % 450 mL IVPB Q24H    bisacodyL EC tablet 10 mg Daily PRN    dextromethorphan-guaiFENesin  mg/5 ml liquid 10 mL Q6H PRN    dextrose 50% injection 12.5 g PRN    diltiaZEM tablet 30 mg Q6H    fentaNYL 2500 mcg in D5W 250 mL infusion premix (titrating) (conc: 10 mcg/mL) PRN    gentamicin - pharmacy to dose pharmacy to manage frequency    glucagon (human recombinant) injection 1 mg PRN    heparin (porcine) injection 4,000 Units PRN    heparin (porcine) injection 5,000 Units Q8H    insulin aspart U-100 injection 1-10 Units Q6H    insulin detemir U-100 injection 20 Units QHS    metoprolol injection 5 mg Q5 Min PRN    NORepinephrine 32 mg in dextrose 5 % 250 mL infusion PRN    ondansetron injection 8 mg Q6H PRN    pantoprazole suspension 40 mg Daily    piperacillin-tazobactam (ZOSYN) 4.5 g in dextrose 5 % in water (D5W) 5 % 100 mL IVPB (MB+) Q12H    sevelamer carbonate pwpk 0.8 g TID WM    simethicone chewable tablet 80 mg  TID PRN    ticagrelor tablet 90 mg BID    traZODone tablet 50 mg Nightly PRN       Objective:     Vital Signs (Most Recent):  Temp: 97.8 °F (36.6 °C) (01/21/22 0815)  Pulse: 78 (01/21/22 0930)  Resp: 20 (01/21/22 0930)  BP: 122/66 (01/21/22 0930)  SpO2: 99 % (01/21/22 0715)  O2 Device (Oxygen Therapy): ventilator (01/21/22 0400) Vital Signs (24h Range):  Temp:  [97.8 °F (36.6 °C)-98.4 °F (36.9 °C)] 97.8 °F (36.6 °C)  Pulse:  [] 78  Resp:  [9-29] 20  SpO2:  [96 %-100 %] 99 %  BP: (101-172)/() 122/66     Weight: 101.5 kg (223 lb 12.3 oz) (01/19/22 0600)  Body mass index is 31.21 kg/m².  Body surface area is 2.25 meters squared.    I/O last 3 completed shifts:  In: 3170 [NG/GT:2520; IV Piggyback:650]  Out: 100 [Stool:100]    Physical Exam  Vitals reviewed.   HENT:      Mouth/Throat:      Mouth: Mucous membranes are dry.   Cardiovascular:      Rate and Rhythm: Regular rhythm.      Heart sounds: Normal heart sounds.   Pulmonary:      Effort: Pulmonary effort is normal.      Comments: ventliated  Abdominal:      Palpations: Abdomen is soft.   Neurological:      Mental Status: He is alert.         Significant Labs:  BMP:   Recent Labs   Lab 01/18/22  0605   GLU 85   *   K 4.6   CL 97*   CO2 22   BUN 59*   CREATININE 3.34*   CALCIUM 7.1*   MG 2.2     CBC:   Recent Labs   Lab 01/18/22  0954   WBC 13.43*   RBC 2.61*   HGB 8.0*   HCT 22.7*      MCV 87.0   MCH 30.7   MCHC 35.2        Significant Imaging:  Labs: Reviewed    Assessment/Plan:     RAHTA (acute kidney injury)  Dialysis Monday   Continue with scheduled hemodialysis for this patient.  1/5/2022 Dialysis done today.  No other changes  1/6/2022 Continue with dialysis on tomorrow.  1/7/2022  Dialysis as scheduled today.  And dialysis on tomorrow.  1/10/2022 Dialysis as scheduled.  1/11/2022 No signs of renal recovery.  Continue with dialysis management  1/12/2022 Continue with scheduled hemodialysis for this patient.  1/13/2022 Dialysis scheduled  for tomorrow.  1/14/2022 Dialysis scheduled for Saturday 1/16/2022 Tolerated dialysis on Saturday.  Condition remains critical  1/17/2022  Continue with scheduled hemodialysis for this patient.  The situation remains critical.  1/18/2022 The patient's condition is the same.  1/19/2022 Dialysis as scheduled.  Volume status is stable.  1/20/2022 Continue current management  1/21/2022 Continue with dialysis.        Thank you for your consult. I will follow-up with patient. Please contact us if you have any additional questions.    Edwin Pryor Jr, MD  Nephrology  [facility]

## 2022-01-21 NOTE — PROGRESS NOTES
Pharmacokinetic Follow Up: Gentamicin    Regimen Plan:   Pt is on dialysis.  Pt's random gent level today is below the desired goal level of < 2.  Pharmacy has ordered a one time dose of gent to be given this afternoon.  A random gent level has been ordered for 1/24 at 0600.    Drug levels (last 3 results):   No results for input(s): AMIKACINPEAK, AMIKACINTROU, AMIKACINRAND, AMIKACIN in the last 72 hours.    No results for input(s): GENTAMICIN, GENTPEAK, GENTTROUGH, GENT10, GENT12, GENT8, GENTRANDOM in the last 72 hours.    No results for input(s): TOBRA8, TOBRA10, TOBRA12, TOBRARND, TOBRAMYCIN, TOBRAPEAK, TOBRATROUGH, TOBRAMYCINPE, TOBRAMYCINRA, TOBRAMYCINTR in the last 72 hours.    Pharmacy will continue to monitor.    Please contact pharmacy at extension [phone] with any questions regarding this assessment.    Patient brief summary:  Og Garcia is a 66 y.o. male initiated on aminoglycoside therapy for treatment of  pneumonia    Drug Allergies:   Review of patient's allergies indicates:  No Known Allergies    Actual Body Weight:   101.5 kg    Adjust Body Weight:   85.8 kg    Ideal Body Weight:  75.3 kg    Renal Function:   Estimated Creatinine Clearance: 26.4 mL/min (A) (based on SCr of 3.34 mg/dL (H)).,     Dialysis Method (if applicable):  intermittent HD    CBC (last 72 hours):  No results for input(s): WHITE BLOOD CELL COUNT, HEMOGLOBIN, HEMATOCRIT, PLATELETS, GRAN%, LYMPH%, MONO%, EOSINOPHIL%, BASOPHIL%, DIFFERENTIAL METHOD in the last 72 hours.    Metabolic Panel (last 72 hours):  No results for input(s): SODIUM, POTASSIUM, CHLORIDE, CO2, GLUCOSE, BUN BLD, CREATININE, ALBUMIN, BILIRUBIN TOTAL, ALK PHOS, AST, ALT, MAGNESIUM, PHOSPHORUS in the last 72 hours.    Aminoglycoside Administrations:  aminoglycosides given in last 96 hours                     gentamicin (GARAMYCIN) 170 mg in sodium chloride 0.9% 100 mL IVPB (mg) 170 mg New Bag 01/17/22 0852                    Microbiologic Results:  Microbiology  Results (last 7 days)       Procedure Component Value Units Date/Time    Blood culture (site 2) [[id number]] Collected: 01/16/22 1729    Order Status: Completed Specimen: Blood Updated: 01/21/22 0945     Culture, Blood No Growth At 72 Hours    Culture, Lower Respiratory [[id number]]  (Abnormal)  (Susceptibility) Collected: 01/17/22 1808    Order Status: Completed Specimen: Respiratory from Tracheal Aspirate Updated: 01/20/22 0917     Culture, Lower Respiratory Heavy Growth Pseudomonas aeruginosa     Gram Stain Result Moderate WBC seen      Moderate Gram negative bacilli      Rare Gram positive bacilli      Rare Epithelial cells    Blood culture (site 1) [[id number]]  (Abnormal)  (Susceptibility) Collected: 01/16/22 1726    Order Status: Completed Specimen: Blood Updated: 01/20/22 0758     Culture, Blood Staphylococcus epidermidis     Gram Stain Result Gram positive cocci     Comment: From Aerobic Bottle  This is an appended report. These results have been appended to a previously preliminary verified report.       Narrative:      Site # 1, aerobic and anaerobic  Avoid lines if possible  Site # 1, aerobic and anaerobic  Avoid lines if possible    Verigene Gram-positive Blood Culture Test [[id number]]  (Abnormal) Collected: 01/16/22 1726    Order Status: Completed Specimen: Blood Updated: 01/18/22 1712     Verigene Result Methicillin Resistant Staphylococcus epidermidis     Gram Stain [[id number]] Collected: 01/17/22 1808    Order Status: Canceled Specimen: Respiratory from Tracheal Aspirate Updated: 01/17/22 1928    Blood culture (site 1) [[id number]] Collected: 01/10/22 1539    Order Status: Completed Specimen: Blood Updated: 01/16/22 0806     Culture, Blood No Growth at 5 days    Blood culture (site 2) [[id number]] Collected: 01/10/22 1547    Order Status: Completed Specimen: Blood Updated: 01/16/22 0806     Culture, Blood No Growth at 5 days    Blood culture (site 1) [[id number]] Collected: 01/09/22 1054    Order Status:  Completed Specimen: Blood Updated: 01/15/22 0750     Culture, Blood No Growth at 5 days    Blood culture (site 2) [[id number]] Collected: 01/09/22 1100    Order Status: Completed Specimen: Blood Updated: 01/15/22 0750     Culture, Blood No Growth at 5 days

## 2022-01-21 NOTE — ASSESSMENT & PLAN NOTE
- nephrology consulted   - no sign of renal recovery.  Continuing with scheduled hemodialysis for this patient.  - tunneled HD line placement 12/30  Continues with dialysis  1/13 nephrology note reviewed and appreciated. No renal recovery. Continue with HD per nephrology    1/21-- HD today

## 2022-01-21 NOTE — PLAN OF CARE
Problem: Communication Impairment (Mechanical Ventilation, Invasive)  Goal: Effective Communication  Outcome: Ongoing, Progressing     Problem: Device-Related Complication Risk (Mechanical Ventilation, Invasive)  Goal: Optimal Device Function  Outcome: Ongoing, Progressing     Problem: Nutrition Impairment (Mechanical Ventilation, Invasive)  Goal: Optimal Nutrition Delivery  Outcome: Ongoing, Progressing     Problem: Skin and Tissue Injury (Mechanical Ventilation, Invasive)  Goal: Absence of Device-Related Skin and Tissue Injury  Outcome: Ongoing, Progressing     Problem: Ventilator-Induced Lung Injury (Mechanical Ventilation, Invasive)  Goal: Absence of Ventilator-Induced Lung Injury  Outcome: Ongoing, Progressing

## 2022-01-21 NOTE — ASSESSMENT & PLAN NOTE
Defer to Dr. Hall on all antibiotic choices---Her note has been reviewed and is appreciated. On amphotericin, zosyn, and gent

## 2022-01-22 NOTE — PROGRESS NOTES
[facility]  Pulmonology  Progress Note    Patient Name: Og Garcia  MRN: [id number]  Admission Date: 12/23/2021  Hospital Length of Stay: 30 days  Code Status: Prior  Attending Provider: Cecil Abernathy DO  Primary Care Provider: Hector Arndt DNP, FNP-C   Principal Problem: Denice gangrene    Subjective:     Interval History: No acute events overnight. Currently hemodynamically stable. Oxygenating adequately on the ventilator. Currently afebrile.    Objective:     Vital Signs (Most Recent):  Temp: 97.1 °F (36.2 °C) (01/21/22 1500)  Pulse: 104 (01/22/22 0700)  Resp: 16 (01/22/22 0700)  BP: (!) 168/91 (01/22/22 0736)  SpO2: 97 % (01/22/22 0700) Vital Signs (24h Range):  Temp:  [97.1 °F (36.2 °C)-97.2 °F (36.2 °C)] 97.1 °F (36.2 °C)  Pulse:  [] 104  Resp:  [14-36] 16  SpO2:  [87 %-100 %] 97 %  BP: (110-186)/() 168/91     Weight: 101.5 kg (223 lb 12.3 oz)  Body mass index is 31.21 kg/m².      Intake/Output Summary (Last 24 hours) at 1/22/2022 0936  Last data filed at 1/21/2022 1830  Gross per 24 hour   Intake 230 ml   Output 2700 ml   Net -2470 ml       Physical Exam  Vitals reviewed.   Constitutional:       General: He is not in acute distress.     Appearance: He is ill-appearing.   HENT:      Head: Normocephalic and atraumatic.      Right Ear: External ear normal.      Left Ear: External ear normal.      Nose: Nose normal.      Mouth/Throat:      Mouth: Mucous membranes are moist.      Comments: trach  Eyes:      Conjunctiva/sclera: Conjunctivae normal.      Pupils: Pupils are equal, round, and reactive to light.   Cardiovascular:      Rate and Rhythm: Regular rhythm. Tachycardia present.      Pulses: Normal pulses.      Heart sounds: Normal heart sounds.   Pulmonary:      Effort: No respiratory distress.      Comments: clear breath sounds anteriorly  Abdominal:      Palpations: Abdomen is soft.   Musculoskeletal:         General: Normal range of motion.      Cervical  back: Neck supple.      Right lower leg: Edema present.      Left lower leg: Edema present.   Lymphadenopathy:      Cervical: No cervical adenopathy.   Skin:     General: Skin is warm and dry.   Neurological:      Cranial Nerves: No cranial nerve deficit.      Comments: Remains on mechanical ventilation. Will track at times         Vents:  Vent Mode: A/C (01/22/22 0319)  Set Rate: 16 BPM (01/22/22 0319)  Vt Set: 480 mL (01/22/22 0319)  Pressure Support: 15 cmH20 (01/21/22 2330)  PEEP/CPAP: 5 cmH20 (01/22/22 0319)  Oxygen Concentration (%): 40 (01/22/22 0410)  Peak Airway Pressure: 28 cmH2O (01/22/22 0319)  Total Ve: 11.8 mL (01/22/22 0319)  F/VT Ratio<105 (RSBI): (!) 75.19 (01/21/22 1800)    Lines/Drains/Airways     Peripherally Inserted Central Catheter Line            PICC Double Lumen 01/05/22 left basilic 17 days          Central Venous Catheter Line                 Hemodialysis Catheter 12/30/21 0900 right internal jugular 23 days          Drain                 NG/OG Tube [place] sump 18 Fr. Left nostril -- days         Colostomy 12/18/21 1030 Descending/sigmoid LUQ 34 days          Airway                 Surgical Airway 01/04/22 Shiley 18 days          Peripheral Intravenous Line                 Peripheral IV - Single Lumen 01/14/22 1835 18 G Anterior;Left;Proximal Forearm 7 days                Significant Labs:    CBC/Anemia Profile:  No results for input(s): WBC, HGB, HCT, PLT, MCV, RDW, IRON, FERRITIN, RETIC, FOLATE, [id number], OCCULTBLOOD in the last 48 hours.     Chemistries:  No results for input(s): NA, K, CL, CO2, BUN, CREATININE, CALCIUM, ALBUMIN, PROT, BILITOT, ALKPHOS, ALT, AST, GLUCOSE, MG, PHOS in the last 48 hours.    All pertinent labs within the past 24 hours have been reviewed.    Significant Imaging:  I have reviewed all pertinent imaging results/findings within the past 24 hours.    Assessment/Plan:     * Denice gangrene  - s/p multiple debridements  - wound vac in place  - ID consulted for  antibiotic management: He was treated with rocephin, daptomycin, clindamycin. 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 Amphotericin started on 1/7. The patient went to OR on 1/10 for debridement. Surgery note reviewed and appreciated.  1/12- back to OR today.  1/13- OR note reviewed. No purulence noted.    1/16- tmax 101.5 -- repeat BC   01/17/2022 low-grade temp cultures pending review antibiotics  01/20/2022 stable    Disseminated mucormycosis  Defer to Dr. Hall on all antibiotic choices---Her note has been reviewed and is appreciated. On amphotericin, zosyn, and gent    Type 2 diabetes mellitus without complication, without long-term current use of insulin  - continue basal/bolus insulin  -accuchecks are reasonable currently      Some hypoglycemia in last 24 hours. Will hold levemir today. Continue to monitor blood sugar and make adjustments as needed    Acute blood loss anemia  - H/H down to 5.5/16 - pt had bleeding from his HD insertion site yesterday this has now resolved.   - will transfuse 2 units PRBCs on HD  1/2- 9.7/27.5 - repeat CBC in AM  1/4- Hgb is 7.2. Not sure if he will be transfused during surgery. IF not we will try to get  Him transfused with next HD  01/06/2021 transfuse 1 unit of packed red blood cells today  01/10/2021 needs a unit of blood  1/11- repeat H&H in am. Continue to transfuse as needed.  1/13- Hgb is 5. Transfusing today. Post transfusion H&H  1/14- CBC pending for today  1/15- 6/18 - hemodynamically stable, recheck in AM   1/16-- 6.1/17.1 - critical low blood shortage  in hospital - holding transfusion until next HD since he is hemodynamically stable and not actively bleeding   01/17/2021 day transfuse blood on dialysis today  01/18/2020 to hemoglobin up to 9 after transfusion  01/20/2022 no signs of bleeding hemoglobin 8    RAHAT (acute kidney injury)  - nephrology consulted   - no sign of renal recovery.  Continuing with scheduled hemodialysis for this patient.  - tunneled HD line placement 12/30  Continues with dialysis  1/13 nephrology note reviewed and appreciated. No renal recovery. Continue with HD per nephrology    1/21-- HD today        On mechanically assisted ventilation  - 12/14 intubated, tracheostomy 1/4  Currently very ill have not been able to wean  1/11- remains very sick. Not able to tolerate any weaning trials. He is oxygenating adequately on 40%. Prognosis is poor.  1/13- no significant change. He is critically ill and not able to tolerate weaning trials currently   If I can get him off of pressors I will try some SIMV with reduced rate.  1/14- placed him on PSV while I was in the room and he did well. We will try 1 hour bid today  1/16- not doing well on PSV   01/17/2022 patient doing not doing well weaning from the ventilator  01/18/2022 patient has multiple problems ongoing is unable to be weaned from the vent currently  01/20/2022 we will attempt some weaning from the ventilator  1/21- FiO2 40% and peep of 5. Continue to wean as able  1/22- doing some trials. Will continue to increase as able      Hypertension  Currently hypotensive. Holding any antihypertensives  BP looks a little better this am. Weaning pressor as tolerated  1/21- No longer on pressor.                  Cecil Abernathy, DO  Pulmonology  [facility]

## 2022-01-22 NOTE — PROGRESS NOTES
Progress Note                                                           Infectious disease   Admit Date: 12/23/2021    SUBJECTIVE:     Follow-up For:  Denice gangrene    HPI/Interval history:  Patient afebrile , he has been more alert .  Still has a lot of endotracheal secretions.     Review of Systems:    Unable to obtain as he is on the vent.      OBJECTIVE:     Vital Signs Range (Last 24H):  Temp:  [97.1 °F (36.2 °C)-98.2 °F (36.8 °C)]   Pulse:  []   Resp:  [14-36]   BP: (110-186)/()   SpO2:  [87 %-100 %]     Physical Exam   Constitutional: Pt is awake but I did not get him to follow commands   Head: Normocephalic, scarring about right orbit from old injury.   Eyes, nose and throat:  Pale moist mucosa, anicteric and acyanotic, enucleated right eye, unable to examine mouth as not able to get it open  Neck: Neck with tracheostomy, some yellowish-brown mucoid drainage around the tubing  Cardiovascular: Normal rate and regular rhythm.  S1 and S2 appreciated by ascultation, no murmurs  Pulmonary/Chest: Effort normal, no crepitations or wheezes auscultated   Abdomen:  Wound VAC to large midline wound, colostomy present, not distended, normal bowel sounds.  Wounds in groin/buttock regions also better.  Extremities:  Generalized body edema present.   Skin:  Blister to dorsum of right hand    Laboratory:  CBC:   Recent Labs   Lab 01/18/22  0954   WBC 13.43*   RBC 2.61*   HGB 8.0*   HCT 22.7*      MCV 87.0   MCH 30.7   MCHC 35.2     BMP:   Recent Labs   Lab 01/18/22  0605   GLU 85   *   K 4.6   CL 97*   CO2 22   BUN 59*   CREATININE 3.34*   CALCIUM 7.1*   MG 2.2     CMP:   Recent Labs   Lab 01/18/22  0605   GLU 85   CALCIUM 7.1*   ALBUMIN 0.8*   PROT 5.6*   *   K 4.6   CO2 22   CL 97*   BUN 59*   CREATININE 3.34*   ALKPHOS 369*   ALT 39   AST 61*   BILITOT 0.4     Microbiology Results (last 7 days)     Procedure Component Value Units Date/Time    Blood culture (site 2) [[id number]]  Collected: 01/16/22 1729    Order Status: Completed Specimen: Blood Updated: 01/22/22 0726     Culture, Blood No Growth at 5 days    Culture, Lower Respiratory [[id number]]  (Abnormal)  (Susceptibility) Collected: 01/17/22 1808    Order Status: Completed Specimen: Respiratory from Tracheal Aspirate Updated: 01/20/22 0917     Culture, Lower Respiratory Heavy Growth Pseudomonas aeruginosa     Gram Stain Result Moderate WBC seen      Moderate Gram negative bacilli      Rare Gram positive bacilli      Rare Epithelial cells    Blood culture (site 1) [[id number]]  (Abnormal)  (Susceptibility) Collected: 01/16/22 1726    Order Status: Completed Specimen: Blood Updated: 01/20/22 0758     Culture, Blood Staphylococcus epidermidis     Gram Stain Result Gram positive cocci     Comment: From [facility]  This is an appended report. These results have been appended to a previously preliminary verified report.       Narrative:      Site # 1, aerobic and anaerobic  Avoid lines if possible  Site # 1, aerobic and anaerobic  Avoid lines if possible    Verigene Gram-positive Blood Culture Test [[id number]]  (Abnormal) Collected: 01/16/22 1726    Order Status: Completed Specimen: Blood Updated: 01/18/22 1712     Verigene Result Methicillin Resistant Staphylococcus epidermidis     Gram Stain [[id number]] Collected: 01/17/22 1808    Order Status: Canceled Specimen: Respiratory from Tracheal Aspirate Updated: 01/17/22 1928    Blood culture (site 1) [[id number]] Collected: 01/10/22 1539    Order Status: Completed Specimen: Blood Updated: 01/16/22 0806     Culture, Blood No Growth at 5 days    Blood culture (site 2) [[id number]] Collected: 01/10/22 1547    Order Status: Completed Specimen: Blood Updated: 01/16/22 0806     Culture, Blood No Growth at 5 days          Diagnostic Results:  Chest x-ray images personally reviewed, similar bilateral patchy lung infiltrates and yesterday's x-ray  Labs: Reviewed    ASSESSMENT/PLAN:     Active Hospital  Problems    Diagnosis  POA    *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]  Yes    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 Necrotizing fasciitis involving perineum and extending up into pelvis and abdomen admitted originally in mid December.  Infection is polymicrobial including several bacteria along with mucor spp.  His wounds are improving in appearance.  2. Multi organ failure due to above, on mechanical ventilation, requiring hemodialysis  3. Recurring fever - likely due to vent associated pneumonia caused by Pseudomonas periods fever curve down on double antibiotic therapy for Pseudomonas.   4. History of diabetes        PLAN:  1. Continue amphotericin B; given extent of his wound and the type of yeast we are dealing with he likely needs this for at least 4 weeks  2. Continue Zosyn along with gentamicin for double coverage of Pseudomonas   3. Continue aggressive wound care

## 2022-01-22 NOTE — SUBJECTIVE & OBJECTIVE
Interval History: No acute events overnight. Currently hemodynamically stable. Oxygenating adequately on the ventilator. Currently afebrile.    Objective:     Vital Signs (Most Recent):  Temp: 97.1 °F (36.2 °C) (01/21/22 1500)  Pulse: 104 (01/22/22 0700)  Resp: 16 (01/22/22 0700)  BP: (!) 168/91 (01/22/22 0736)  SpO2: 97 % (01/22/22 0700) Vital Signs (24h Range):  Temp:  [97.1 °F (36.2 °C)-97.2 °F (36.2 °C)] 97.1 °F (36.2 °C)  Pulse:  [] 104  Resp:  [14-36] 16  SpO2:  [87 %-100 %] 97 %  BP: (110-186)/() 168/91     Weight: 101.5 kg (223 lb 12.3 oz)  Body mass index is 31.21 kg/m².      Intake/Output Summary (Last 24 hours) at 1/22/2022 0936  Last data filed at 1/21/2022 1830  Gross per 24 hour   Intake 230 ml   Output 2700 ml   Net -2470 ml       Physical Exam  Vitals reviewed.   Constitutional:       General: He is not in acute distress.     Appearance: He is ill-appearing.   HENT:      Head: Normocephalic and atraumatic.      Right Ear: External ear normal.      Left Ear: External ear normal.      Nose: Nose normal.      Mouth/Throat:      Mouth: Mucous membranes are moist.      Comments: trach  Eyes:      Conjunctiva/sclera: Conjunctivae normal.      Pupils: Pupils are equal, round, and reactive to light.   Cardiovascular:      Rate and Rhythm: Regular rhythm. Tachycardia present.      Pulses: Normal pulses.      Heart sounds: Normal heart sounds.   Pulmonary:      Effort: No respiratory distress.      Comments: clear breath sounds anteriorly  Abdominal:      Palpations: Abdomen is soft.   Musculoskeletal:         General: Normal range of motion.      Cervical back: Neck supple.      Right lower leg: Edema present.      Left lower leg: Edema present.   Lymphadenopathy:      Cervical: No cervical adenopathy.   Skin:     General: Skin is warm and dry.   Neurological:      Cranial Nerves: No cranial nerve deficit.      Comments: Remains on mechanical ventilation. Will track at times         Vents:  Vent  Mode: A/C (01/22/22 0319)  Set Rate: 16 BPM (01/22/22 0319)  Vt Set: 480 mL (01/22/22 0319)  Pressure Support: 15 cmH20 (01/21/22 2330)  PEEP/CPAP: 5 cmH20 (01/22/22 0319)  Oxygen Concentration (%): 40 (01/22/22 0410)  Peak Airway Pressure: 28 cmH2O (01/22/22 0319)  Total Ve: 11.8 mL (01/22/22 0319)  F/VT Ratio<105 (RSBI): (!) 75.19 (01/21/22 1800)    Lines/Drains/Airways     Peripherally Inserted Central Catheter Line            PICC Double Lumen 01/05/22 left basilic 17 days          Central Venous Catheter Line                 Hemodialysis Catheter 12/30/21 0900 right internal jugular 23 days          Drain                 NG/OG Tube [place] sump 18 Fr. Left nostril -- days         Colostomy 12/18/21 1030 Descending/sigmoid LUQ 34 days          Airway                 Surgical Airway 01/04/22 Shiley 18 days          Peripheral Intravenous Line                 Peripheral IV - Single Lumen 01/14/22 1835 18 G Anterior;Left;Proximal Forearm 7 days                Significant Labs:    CBC/Anemia Profile:  No results for input(s): WBC, HGB, HCT, PLT, MCV, RDW, IRON, FERRITIN, RETIC, FOLATE, [id number], OCCULTBLOOD in the last 48 hours.     Chemistries:  No results for input(s): NA, K, CL, CO2, BUN, CREATININE, CALCIUM, ALBUMIN, PROT, BILITOT, ALKPHOS, ALT, AST, GLUCOSE, MG, PHOS in the last 48 hours.    All pertinent labs within the past 24 hours have been reviewed.    Significant Imaging:  I have reviewed all pertinent imaging results/findings within the past 24 hours.

## 2022-01-22 NOTE — ASSESSMENT & PLAN NOTE
- 12/14 intubated, tracheostomy 1/4  Currently very ill have not been able to wean  1/11- remains very sick. Not able to tolerate any weaning trials. He is oxygenating adequately on 40%. Prognosis is poor.  1/13- no significant change. He is critically ill and not able to tolerate weaning trials currently   If I can get him off of pressors I will try some SIMV with reduced rate.  1/14- placed him on PSV while I was in the room and he did well. We will try 1 hour bid today  1/16- not doing well on PSV   01/17/2022 patient doing not doing well weaning from the ventilator  01/18/2022 patient has multiple problems ongoing is unable to be weaned from the vent currently  01/20/2022 we will attempt some weaning from the ventilator  1/21- FiO2 40% and peep of 5. Continue to wean as able  1/22- doing some trials. Will continue to increase as able

## 2022-01-22 NOTE — ASSESSMENT & PLAN NOTE
- s/p multiple debridements  - wound vac in place  - ID consulted for antibiotic management: He was treated with rocephin, daptomycin, clindamycin. 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 Amphotericin started on 1/7. The patient went to OR on 1/10 for debridement. Surgery note reviewed and appreciated.  1/12- back to OR today.  1/13- OR note reviewed. No purulence noted.    1/16- tmax 101.5 -- repeat BC   01/17/2022 low-grade temp cultures pending review antibiotics  01/20/2022 stable

## 2022-01-22 NOTE — RESPIRATORY THERAPY
Pt. Tolerated approximately one hour of SIMV rr of 6 psv 15 trial. Pt. Tolerated trial well. Pt. Placed back on previous AC settings at this time.

## 2022-01-22 NOTE — ASSESSMENT & PLAN NOTE
- continue basal/bolus insulin  -accuchecks are reasonable currently      Some hypoglycemia in last 24 hours. Will hold levemir today. Continue to monitor blood sugar and make adjustments as needed

## 2022-01-23 NOTE — PROGRESS NOTES
[facility]  Pulmonology  Progress Note    Patient Name: Og Garcia  MRN: [id number]  Admission Date: 12/23/2021  Hospital Length of Stay: 31 days  Code Status: Prior  Attending Provider: Cecil Abernathy DO  Primary Care Provider: Hector Arndt DNP, FNP-C   Principal Problem: Denice gangrene    Subjective:     Interval History: No acute events overnight. Currently hemodynamically stable. Oxygenating adequately on the ventilator. Currently afebrile.    Objective:     Vital Signs (Most Recent):  Temp: 98.3 °F (36.8 °C) (01/23/22 0730)  Pulse: 86 (01/23/22 0730)  Resp: (!) 25 (01/23/22 0730)  BP: (!) 135/52 (01/23/22 1216)  SpO2: 100 % (01/23/22 0730) Vital Signs (24h Range):  Temp:  [98.3 °F (36.8 °C)-99.5 °F (37.5 °C)] 98.3 °F (36.8 °C)  Pulse:  [] 86  Resp:  [11-37] 25  SpO2:  [95 %-100 %] 100 %  BP: (115-179)/(52-91) 135/52     Weight: 103.3 kg (227 lb 11.8 oz)  Body mass index is 31.76 kg/m².      Intake/Output Summary (Last 24 hours) at 1/23/2022 1255  Last data filed at 1/23/2022 0657  Gross per 24 hour   Intake 1360 ml   Output 400 ml   Net 960 ml       Physical Exam  Vitals reviewed.   Constitutional:       General: He is not in acute distress.     Appearance: He is ill-appearing.   HENT:      Head: Normocephalic and atraumatic.      Right Ear: External ear normal.      Left Ear: External ear normal.      Nose: Nose normal.      Mouth/Throat:      Mouth: Mucous membranes are moist.      Comments: trach  Eyes:      Conjunctiva/sclera: Conjunctivae normal.      Pupils: Pupils are equal, round, and reactive to light.   Cardiovascular:      Rate and Rhythm: Regular rhythm. Tachycardia present.      Pulses: Normal pulses.      Heart sounds: Normal heart sounds.   Pulmonary:      Effort: No respiratory distress.      Comments: clear breath sounds anteriorly  Abdominal:      Palpations: Abdomen is soft.   Musculoskeletal:         General: Normal range of motion.       Voicemail:      [facility] calling to clarify medication  Patient was prescribed Fluticasone 27 5mcg  Pharmacy has Fluticasone 50mcg  Please send new script if you would like to change the medication  Cervical back: Neck supple.      Right lower leg: Edema present.      Left lower leg: Edema present.   Lymphadenopathy:      Cervical: No cervical adenopathy.   Skin:     General: Skin is warm and dry.   Neurological:      Cranial Nerves: No cranial nerve deficit.      Comments: Remains on mechanical ventilation. Will track at times         Vents:  Vent Mode:  (Placed back on A/C keaton Simv well x 1hr) (01/23/22 1005)  Set Rate: 16 BPM (01/23/22 0905)  Vt Set: 480 mL (01/23/22 0905)  Pressure Support: 15 cmH20 (01/22/22 2035)  PEEP/CPAP: 5 cmH20 (01/23/22 0905)  Oxygen Concentration (%): 40 (01/23/22 0905)  Peak Airway Pressure: 19 cmH2O (01/23/22 0905)  Total Ve: 7.5 mL (01/23/22 0905)  F/VT Ratio<105 (RSBI): (!) 81.57 (01/23/22 0400)    Lines/Drains/Airways     Peripherally Inserted Central Catheter Line            PICC Double Lumen 01/05/22 left basilic 18 days          Central Venous Catheter Line                 Hemodialysis Catheter 12/30/21 0900 right internal jugular 24 days          Drain                 NG/OG Tube [place] sump 18 Fr. Left nostril -- days         Colostomy 12/18/21 1030 Descending/sigmoid LUQ 36 days          Airway                 Surgical Airway 01/04/22 Shiley 19 days          Peripheral Intravenous Line                 Peripheral IV - Single Lumen 01/14/22 1835 18 G Anterior;Left;Proximal Forearm 8 days                Significant Labs:    CBC/Anemia Profile:  No results for input(s): WBC, HGB, HCT, PLT, MCV, RDW, IRON, FERRITIN, RETIC, FOLATE, [id number], OCCULTBLOOD in the last 48 hours.     Chemistries:  No results for input(s): NA, K, CL, CO2, BUN, CREATININE, CALCIUM, ALBUMIN, PROT, BILITOT, ALKPHOS, ALT, AST, GLUCOSE, MG, PHOS in the last 48 hours.    All pertinent labs within the past 24 hours have been reviewed.    Significant Imaging:  I have reviewed all pertinent imaging results/findings within the past 24 hours.    Assessment/Plan:     * Denice gangrene  - s/p multiple  debridements  - wound vac in place  - ID consulted for antibiotic management: He was treated with rocephin, daptomycin, clindamycin. 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 Amphotericin started on 1/7. The patient went to OR on 1/10 for debridement. Surgery note reviewed and appreciated.  1/12- back to OR today.  1/13- OR note reviewed. No purulence noted.    1/16- tmax 101.5 -- repeat BC   01/17/2022 low-grade temp cultures pending review antibiotics  01/23/2022 stable    Disseminated mucormycosis  Defer to Dr. Hall on all antibiotic choices---Her note has been reviewed and is appreciated. On amphotericin, zosyn, and gent    Type 2 diabetes mellitus without complication, without long-term current use of insulin  - continue basal/bolus insulin  -accuchecks are reasonable currently      Some hypoglycemia in last 24 hours. Will hold levemir today. Continue to monitor blood sugar and make adjustments as needed  Blood sugars improving    Acute blood loss anemia  - H/H down to 5.5/16 - pt had bleeding from his HD insertion site yesterday this has now resolved.   - will transfuse 2 units PRBCs on HD  1/2- 9.7/27.5 - repeat CBC in AM  1/4- Hgb is 7.2. Not sure if he will be transfused during surgery. IF not we will try to get  Him transfused with next HD  01/06/2021 transfuse 1 unit of packed red blood cells today  01/10/2021 needs a unit of blood  1/11- repeat H&H in am. Continue to transfuse as needed.  1/13- Hgb is 5. Transfusing today. Post transfusion H&H  1/14- CBC pending for today  1/15- 6/18 -  hemodynamically stable, recheck in AM   1/16-- 6.1/17.1 - critical low blood shortage in hospital - holding transfusion until next HD since he is hemodynamically stable and not actively bleeding   01/17/2021 day transfuse blood on dialysis today  01/18/2020 to hemoglobin up to 9 after transfusion  01/20/2022 no signs of bleeding hemoglobin 8    RAHAT (acute kidney injury)  - nephrology consulted   - no sign of renal recovery.  Continuing with scheduled hemodialysis for this patient.  - tunneled HD line placement 12/30  Continues with dialysis  1/13 nephrology note reviewed and appreciated. No renal recovery. Continue with HD per nephrology    1/21-- HD today        On mechanically assisted ventilation  - 12/14 intubated, tracheostomy 1/4  Currently very ill have not been able to wean  1/11- remains very sick. Not able to tolerate any weaning trials. He is oxygenating adequately on 40%. Prognosis is poor.  1/13- no significant change. He is critically ill and not able to tolerate weaning trials currently   If I can get him off of pressors I will try some SIMV with reduced rate.  1/14- placed him on PSV while I was in the room and he did well. We will try 1 hour bid today  1/16- not doing well on PSV   01/17/2022 patient doing not doing well weaning from the ventilator  01/18/2022 patient has multiple problems ongoing is unable to be weaned from the vent currently  01/20/2022 we will attempt some weaning from the ventilator  1/21- FiO2 40% and peep of 5. Continue to wean as able  1/23- doing well with current trials. Will continue to increase as able      Hypertension  Currently hypotensive. Holding any antihypertensives  BP looks a little better this am. Weaning pressor as tolerated  1/21- No longer on pressor.                  Cecil Abernathy, DO  Pulmonology  [facility]

## 2022-01-23 NOTE — PROGRESS NOTES
[facility]  Nephrology  Progress Note    Patient Name: Og Garcia  MRN: [id number]  Admission Date: 12/23/2021  Hospital Length of Stay: 31 days  Attending Provider: Cecil Abernathy DO   Primary Care Physician: Hector Arndt DNP, FNP-C  Principal Problem:Denice gangrene    Consults  Subjective:     Interval History: the patient is in no distress today    Review of patient's allergies indicates:  No Known Allergies  Current Facility-Administered Medications   Medication Frequency    0.9%  NaCl infusion (for blood administration) Q24H PRN    0.9%  NaCl infusion PRN    0.9%  NaCl infusion PRN    acetaminophen tablet 1,000 mg Q6H PRN    acetaminophen tablet 500 mg Q6H PRN    albuterol nebulizer solution 2.5 mg Q4H PRN    amphotericin B liposome (AMBISOME) 450 mg in dextrose 5 % 450 mL IVPB Q24H    bisacodyL EC tablet 10 mg Daily PRN    dextromethorphan-guaiFENesin  mg/5 ml liquid 10 mL Q6H PRN    dextrose 50% injection 12.5 g PRN    diltiaZEM tablet 30 mg Q6H    fentaNYL 2500 mcg in D5W 250 mL infusion premix (titrating) (conc: 10 mcg/mL) PRN    gentamicin - pharmacy to dose pharmacy to manage frequency    glucagon (human recombinant) injection 1 mg PRN    heparin (porcine) injection 4,000 Units PRN    heparin (porcine) injection 5,000 Units Q8H    insulin aspart U-100 injection 1-10 Units Q6H    metoprolol injection 5 mg Q5 Min PRN    NORepinephrine 32 mg in dextrose 5 % 250 mL infusion PRN    ondansetron injection 8 mg Q6H PRN    pantoprazole suspension 40 mg Daily    piperacillin-tazobactam (ZOSYN) 4.5 g in dextrose 5 % in water (D5W) 5 % 100 mL IVPB (MB+) Q12H    simethicone chewable tablet 80 mg TID PRN    ticagrelor tablet 90 mg BID    traZODone tablet 50 mg Nightly PRN       Objective:     Vital Signs (Most Recent):  Temp: 98.1 °F (36.7 °C) (01/23/22 1500)  Pulse: 103 (01/23/22 1630)  Resp: (!) 29 (01/23/22 1630)  BP: 116/62 (01/23/22  1630)  SpO2: 100 % (01/23/22 1630)  O2 Device (Oxygen Therapy): ventilator (01/23/22 1440) Vital Signs (24h Range):  Temp:  [98.1 °F (36.7 °C)-99.5 °F (37.5 °C)] 98.1 °F (36.7 °C)  Pulse:  [] 103  Resp:  [12-36] 29  SpO2:  [97 %-100 %] 100 %  BP: ()/(46-82) 116/62     Weight: 103.3 kg (227 lb 11.8 oz) (01/23/22 0657)  Body mass index is 31.76 kg/m².  Body surface area is 2.27 meters squared.    I/O last 3 completed shifts:  In: 1560 [NG/GT:1260; IV Piggyback:300]  Out: 600 [Urine:200; Stool:400]    Physical Exam   Lungs-clear  Cor-no murmur or rub  Abd-soft    Significant Labs:sure  CBC:   Recent Labs   Lab 01/18/22  0954   WBC 13.43*   RBC 2.61*   HGB 8.0*   HCT 22.7*      MCV 87.0   MCH 30.7   MCHC 35.2     CMP:   Recent Labs   Lab 01/18/22  0605   GLU 85   CALCIUM 7.1*   ALBUMIN 0.8*   PROT 5.6*   *   K 4.6   CO2 22   CL 97*   BUN 59*   CREATININE 3.34*   ALKPHOS 369*   ALT 39   AST 61*   BILITOT 0.4     All labs within the past 24 hours have been reviewed.    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] 12/13/2021 Yes    Disseminated mucormycosis [B46.4] 01/17/2022 Unknown    Ventilator associated pneumonia [J95.851] 01/09/2022 No    Hyperphosphatemia [E83.39] 01/07/2022 No    Acute blood loss anemia [D62] 12/25/2021 No    Type 2 diabetes mellitus without complication, without long-term current use of insulin [E11.9] 12/25/2021 Yes    Necrotizing fasciitis [M72.6]  Yes    Hypocalcemia [E83.51] 12/16/2021 Yes    RAHAT (acute kidney injury) [N17.9] 12/14/2021 Yes    On mechanically assisted ventilation [Z99.11] 12/14/2021 Not Applicable    Hypertension [I10] 09/02/2021 Yes      Problems Resolved During this Admission:    Diagnosis Date Noted Date Resolved POA    Shock [R57.9] 01/08/2022 01/16/2022 Yes       Plan:hemodialysis tomorrow    Thank you for your consult. I will follow-up  with patient. Please contact us if you have any additional questions.    Damir Avalos MD  Nephrology  [facility]

## 2022-01-23 NOTE — PROGRESS NOTES
Progress Note                                                           Infectious disease   Admit Date: 12/23/2021    SUBJECTIVE:     Follow-up For:  Denice gangrene    HPI/Interval history:  Patient with no fever, still considerable into tracheal secretions.      Review of Systems:    Unable to obtain as he is on the vent.      OBJECTIVE:     Vital Signs Range (Last 24H):  Temp:  [98.3 °F (36.8 °C)-99.5 °F (37.5 °C)]   Pulse:  []   Resp:  [11-37]   BP: (115-179)/(52-91)   SpO2:  [95 %-100 %]     Physical Exam   Constitutional: Pt is sleeping but arousable  Head: Normocephalic, scarring about right orbit from old injury.   Eyes, nose and throat:  Pale moist mucosa, anicteric and acyanotic, enucleated right eye, unable to examine mouth as not able to get it open  Neck: Neck with tracheostomy, some yellowish-brown mucoid drainage around the tubing  Cardiovascular: Normal rate and regular rhythm.  S1 and S2 appreciated by ascultation, no murmurs  Pulmonary/Chest: Effort normal, no crepitations or wheezes auscultated   Abdomen:  Wound VAC to large midline wound, colostomy present, not distended, normal bowel sounds.  Wounds in groin/buttock regions also better.  Extremities:  Generalized body edema present.   Skin:  Blister to dorsum of right hand starting to dry a little    Laboratory:  CBC:   Recent Labs   Lab 01/18/22  0954   WBC 13.43*   RBC 2.61*   HGB 8.0*   HCT 22.7*      MCV 87.0   MCH 30.7   MCHC 35.2     BMP:   Recent Labs   Lab 01/18/22  0605   GLU 85   *   K 4.6   CL 97*   CO2 22   BUN 59*   CREATININE 3.34*   CALCIUM 7.1*   MG 2.2     CMP:   Recent Labs   Lab 01/18/22  0605   GLU 85   CALCIUM 7.1*   ALBUMIN 0.8*   PROT 5.6*   *   K 4.6   CO2 22   CL 97*   BUN 59*   CREATININE 3.34*   ALKPHOS 369*   ALT 39   AST 61*   BILITOT 0.4     Microbiology Results (last 7 days)     Procedure Component Value Units Date/Time    Blood culture (site 2) [[id number]] Collected: 01/16/22 7053     Order Status: Completed Specimen: Blood Updated: 01/22/22 0726     Culture, Blood No Growth at 5 days    Culture, Lower Respiratory [[id number]]  (Abnormal)  (Susceptibility) Collected: 01/17/22 1808    Order Status: Completed Specimen: Respiratory from Tracheal Aspirate Updated: 01/20/22 0917     Culture, Lower Respiratory Heavy Growth Pseudomonas aeruginosa     Gram Stain Result Moderate WBC seen      Moderate Gram negative bacilli      Rare Gram positive bacilli      Rare Epithelial cells    Blood culture (site 1) [[id number]]  (Abnormal)  (Susceptibility) Collected: 01/16/22 1726    Order Status: Completed Specimen: Blood Updated: 01/20/22 0758     Culture, Blood Staphylococcus epidermidis     Gram Stain Result Gram positive cocci     Comment: From Aerobic Bottle  This is an appended report. These results have been appended to a previously preliminary verified report.       Narrative:      Site # 1, aerobic and anaerobic  Avoid lines if possible  Site # 1, aerobic and anaerobic  Avoid lines if possible    Verigene Gram-positive Blood Culture Test [[id number]]  (Abnormal) Collected: 01/16/22 1726    Order Status: Completed Specimen: Blood Updated: 01/18/22 1712     Verigene Result Methicillin Resistant Staphylococcus epidermidis     Gram Stain [[id number]] Collected: 01/17/22 1808    Order Status: Canceled Specimen: Respiratory from Tracheal Aspirate Updated: 01/17/22 1928          Diagnostic Results:  Chest x-ray images personally reviewed, similar bilateral patchy lung infiltrates and yesterday's x-ray  Labs: Reviewed    ASSESSMENT/PLAN:     Active Hospital Problems    Diagnosis  POA    *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]  Yes    Disseminated mucormycosis [B46.4]  Unknown    Ventilator associated pneumonia [J95.851]  No    Hyperphosphatemia [E83.39]  No    Acute blood loss anemia [D62]  No    Type 2 diabetes mellitus without complication, without long-term current use of insulin [E11.9]  Yes    Necrotizing fasciitis  [M72.6]  Yes    Hypocalcemia [E83.51]  Yes    RAHAT (acute kidney injury) [N17.9]  Yes    On mechanically assisted ventilation [Z99.11]  Not Applicable    Hypertension [I10]  Yes      Resolved Hospital Problems    Diagnosis Date Resolved POA    Shock [R57.9] 01/16/2022 Yes       ASSESSMENT:  1. Necrotizing fasciitis involving perineum and extending up into pelvis and abdomen admitted originally in mid December.  Infection is polymicrobial including several bacteria along with mucor spp.  His wounds are improving in appearance.  2. Multi organ failure due to above, on mechanical ventilation, requiring hemodialysis  3. Recurring fever - likely due to vent associated pneumonia caused by Pseudomonas periods fever curve down on double antibiotic therapy for Pseudomonas.   4. History of diabetes        PLAN:  1. Continue amphotericin B; given extent of his wound and the type of yeast we are dealing with he likely needs this for at least 4 weeks though there is no definite endpoint.  Given the appearance of his wounds this past week with mostly healthy pink granulating tissue I think we can plan to stop amphotericin at the 4 week sindhu which would be on February 3.   2. Continue Zosyn along with gentamicin for double coverage of Pseudomonas for 9 more days to complete 2 weeks of therapy.  3. Continue aggressive wound care

## 2022-01-23 NOTE — ASSESSMENT & PLAN NOTE
- s/p multiple debridements  - wound vac in place  - ID consulted for antibiotic management: He was treated with rocephin, daptomycin, clindamycin. 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 Amphotericin started on 1/7. The patient went to OR on 1/10 for debridement. Surgery note reviewed and appreciated.  1/12- back to OR today.  1/13- OR note reviewed. No purulence noted.    1/16- tmax 101.5 -- repeat BC   01/17/2022 low-grade temp cultures pending review antibiotics  01/23/2022 stable

## 2022-01-23 NOTE — ASSESSMENT & PLAN NOTE
- continue basal/bolus insulin  -accuchecks are reasonable currently      Some hypoglycemia in last 24 hours. Will hold levemir today. Continue to monitor blood sugar and make adjustments as needed  Blood sugars improving

## 2022-01-23 NOTE — SUBJECTIVE & OBJECTIVE
Interval History: No acute events overnight. Currently hemodynamically stable. Oxygenating adequately on the ventilator. Currently afebrile.    Objective:     Vital Signs (Most Recent):  Temp: 98.3 °F (36.8 °C) (01/23/22 0730)  Pulse: 86 (01/23/22 0730)  Resp: (!) 25 (01/23/22 0730)  BP: (!) 135/52 (01/23/22 1216)  SpO2: 100 % (01/23/22 0730) Vital Signs (24h Range):  Temp:  [98.3 °F (36.8 °C)-99.5 °F (37.5 °C)] 98.3 °F (36.8 °C)  Pulse:  [] 86  Resp:  [11-37] 25  SpO2:  [95 %-100 %] 100 %  BP: (115-179)/(52-91) 135/52     Weight: 103.3 kg (227 lb 11.8 oz)  Body mass index is 31.76 kg/m².      Intake/Output Summary (Last 24 hours) at 1/23/2022 1255  Last data filed at 1/23/2022 0657  Gross per 24 hour   Intake 1360 ml   Output 400 ml   Net 960 ml       Physical Exam  Vitals reviewed.   Constitutional:       General: He is not in acute distress.     Appearance: He is ill-appearing.   HENT:      Head: Normocephalic and atraumatic.      Right Ear: External ear normal.      Left Ear: External ear normal.      Nose: Nose normal.      Mouth/Throat:      Mouth: Mucous membranes are moist.      Comments: trach  Eyes:      Conjunctiva/sclera: Conjunctivae normal.      Pupils: Pupils are equal, round, and reactive to light.   Cardiovascular:      Rate and Rhythm: Regular rhythm. Tachycardia present.      Pulses: Normal pulses.      Heart sounds: Normal heart sounds.   Pulmonary:      Effort: No respiratory distress.      Comments: clear breath sounds anteriorly  Abdominal:      Palpations: Abdomen is soft.   Musculoskeletal:         General: Normal range of motion.      Cervical back: Neck supple.      Right lower leg: Edema present.      Left lower leg: Edema present.   Lymphadenopathy:      Cervical: No cervical adenopathy.   Skin:     General: Skin is warm and dry.   Neurological:      Cranial Nerves: No cranial nerve deficit.      Comments: Remains on mechanical ventilation. Will track at times         Vents:  Vent  Mode:  (Placed back on A/C keaton Simv well x 1hr) (01/23/22 1005)  Set Rate: 16 BPM (01/23/22 0905)  Vt Set: 480 mL (01/23/22 0905)  Pressure Support: 15 cmH20 (01/22/22 2035)  PEEP/CPAP: 5 cmH20 (01/23/22 0905)  Oxygen Concentration (%): 40 (01/23/22 0905)  Peak Airway Pressure: 19 cmH2O (01/23/22 0905)  Total Ve: 7.5 mL (01/23/22 0905)  F/VT Ratio<105 (RSBI): (!) 81.57 (01/23/22 0400)    Lines/Drains/Airways     Peripherally Inserted Central Catheter Line            PICC Double Lumen 01/05/22 left basilic 18 days          Central Venous Catheter Line                 Hemodialysis Catheter 12/30/21 0900 right internal jugular 24 days          Drain                 NG/OG Tube [place] sump 18 Fr. Left nostril -- days         Colostomy 12/18/21 1030 Descending/sigmoid LUQ 36 days          Airway                 Surgical Airway 01/04/22 Shiley 19 days          Peripheral Intravenous Line                 Peripheral IV - Single Lumen 01/14/22 1835 18 G Anterior;Left;Proximal Forearm 8 days                Significant Labs:    CBC/Anemia Profile:  No results for input(s): WBC, HGB, HCT, PLT, MCV, RDW, IRON, FERRITIN, RETIC, FOLATE, [id number], OCCULTBLOOD in the last 48 hours.     Chemistries:  No results for input(s): NA, K, CL, CO2, BUN, CREATININE, CALCIUM, ALBUMIN, PROT, BILITOT, ALKPHOS, ALT, AST, GLUCOSE, MG, PHOS in the last 48 hours.    All pertinent labs within the past 24 hours have been reviewed.    Significant Imaging:  I have reviewed all pertinent imaging results/findings within the past 24 hours.

## 2022-01-23 NOTE — ASSESSMENT & PLAN NOTE
- 12/14 intubated, tracheostomy 1/4  Currently very ill have not been able to wean  1/11- remains very sick. Not able to tolerate any weaning trials. He is oxygenating adequately on 40%. Prognosis is poor.  1/13- no significant change. He is critically ill and not able to tolerate weaning trials currently   If I can get him off of pressors I will try some SIMV with reduced rate.  1/14- placed him on PSV while I was in the room and he did well. We will try 1 hour bid today  1/16- not doing well on PSV   01/17/2022 patient doing not doing well weaning from the ventilator  01/18/2022 patient has multiple problems ongoing is unable to be weaned from the vent currently  01/20/2022 we will attempt some weaning from the ventilator  1/21- FiO2 40% and peep of 5. Continue to wean as able  1/23- doing well with current trials. Will continue to increase as able

## 2022-01-24 NOTE — ASSESSMENT & PLAN NOTE
- nephrology consulted   - no sign of renal recovery.  Continuing with scheduled hemodialysis for this patient.  - tunneled HD line placement 12/30  Continues with dialysis  1/24 nephrology note reviewed and appreciated. No renal recovery. Continue with HD per nephrology

## 2022-01-24 NOTE — ASSESSMENT & PLAN NOTE
- continue basal/bolus insulin  -accuchecks are reasonable currently      He had some hypoglycemia. We held levemir. Continue to monitor blood sugar and make adjustments as needed  Blood sugars improving

## 2022-01-24 NOTE — ASSESSMENT & PLAN NOTE
- H/H down to 5.5/16 - pt had bleeding from his HD insertion site yesterday this has now resolved.   - will transfuse 2 units PRBCs on HD  1/2- 9.7/27.5 - repeat CBC in AM  1/4- Hgb is 7.2. Not sure if he will be transfused during surgery. IF not we will try to get  Him transfused with next HD  01/06/2021 transfuse 1 unit of packed red blood cells today  01/10/2021 needs a unit of blood  1/11- repeat H&H in am. Continue to transfuse as needed.  1/13- Hgb is 5. Transfusing today. Post transfusion H&H  1/14- CBC pending for today  1/15- 6/18 - hemodynamically stable, recheck in AM   1/16-- 6.1/17.1 - critical low blood shortage in hospital - holding transfusion until next HD since he is hemodynamically stable and not actively bleeding   01/17/2021 day transfuse blood on dialysis today  01/18/2020 to hemoglobin up to 9 after transfusion  01/25/2022 no signs of bleeding most recent hemoglobin 8

## 2022-01-24 NOTE — NURSING
pts right hand noted with breakdown. Skin is starting to open, no drainage at the time. Will continue to monitor.

## 2022-01-24 NOTE — ASSESSMENT & PLAN NOTE
- 12/14 intubated, tracheostomy 1/4  Currently very ill have not been able to wean  1/11- remains very sick. Not able to tolerate any weaning trials. He is oxygenating adequately on 40%. Prognosis is poor.  1/13- no significant change. He is critically ill and not able to tolerate weaning trials currently   If I can get him off of pressors I will try some SIMV with reduced rate.  1/14- placed him on PSV while I was in the room and he did well. We will try 1 hour bid today  1/16- not doing well on PSV   01/17/2022 patient doing not doing well weaning from the ventilator  01/18/2022 patient has multiple problems ongoing is unable to be weaned from the vent currently  01/20/2022 we will attempt some weaning from the ventilator  1/21- FiO2 40% and peep of 5. Continue to wean as able  1/24- doing well with current trials. Will continue to increase as able  1/25- plan for 3 hours tid today

## 2022-01-24 NOTE — RESPIRATORY THERAPY
Pt. Placed back on previous AC settings at this time. Pt. Tolerated approx. 1 hour of SIMV RR of 6 trial.

## 2022-01-24 NOTE — SUBJECTIVE & OBJECTIVE
Interval History: No acute events overnight. Currently hemodynamically stable. Oxygenating adequately on the ventilator. Currently afebrile.    Objective:     Vital Signs (Most Recent):  Temp: 98.7 °F (37.1 °C) (01/24/22 0807)  Pulse: 109 (01/24/22 1125)  Resp: (!) 35 (01/24/22 1125)  BP: 139/70 (01/24/22 1125)  SpO2: 99 % (01/24/22 1125) Vital Signs (24h Range):  Temp:  [98.1 °F (36.7 °C)-99.2 °F (37.3 °C)] 98.7 °F (37.1 °C)  Pulse:  [] 109  Resp:  [13-37] 35  SpO2:  [95 %-100 %] 99 %  BP: ()/(50-95) 139/70     Weight: 102.1 kg (225 lb 1.4 oz)  Body mass index is 31.39 kg/m².      Intake/Output Summary (Last 24 hours) at 1/24/2022 1206  Last data filed at 1/24/2022 1125  Gross per 24 hour   Intake 1860 ml   Output 3001 ml   Net -1141 ml       Physical Exam  Vitals reviewed.   Constitutional:       General: He is not in acute distress.     Appearance: He is ill-appearing.   HENT:      Head: Normocephalic and atraumatic.      Right Ear: External ear normal.      Left Ear: External ear normal.      Nose: Nose normal.      Mouth/Throat:      Mouth: Mucous membranes are moist.      Comments: trach  Eyes:      Conjunctiva/sclera: Conjunctivae normal.      Pupils: Pupils are equal, round, and reactive to light.   Cardiovascular:      Rate and Rhythm: Regular rhythm. Tachycardia present.      Pulses: Normal pulses.      Heart sounds: Normal heart sounds.   Pulmonary:      Effort: No respiratory distress.      Comments: clear breath sounds anteriorly  Abdominal:      Palpations: Abdomen is soft.   Musculoskeletal:         General: Normal range of motion.      Cervical back: Neck supple.      Right lower leg: Edema present.      Left lower leg: Edema present.   Lymphadenopathy:      Cervical: No cervical adenopathy.   Skin:     General: Skin is warm and dry.   Neurological:      Cranial Nerves: No cranial nerve deficit.      Comments: Remains on mechanical ventilation. Will track at times         Vents:  Vent  Mode: A/C (01/24/22 0344)  Set Rate: 14 BPM (01/24/22 0802)  Vt Set: 480 mL (01/24/22 0802)  Pressure Support: 15 cmH20 (01/23/22 2338)  PEEP/CPAP: 5 cmH20 (01/24/22 0802)  Oxygen Concentration (%): 35 (01/24/22 1125)  Peak Airway Pressure: 22 cmH2O (01/24/22 0802)  Total Ve: 6.4 mL (01/24/22 0802)  F/VT Ratio<105 (RSBI): (!) 81.57 (01/23/22 0400)    Lines/Drains/Airways     Peripherally Inserted Central Catheter Line            PICC Double Lumen 01/05/22 left basilic 19 days          Central Venous Catheter Line                 Hemodialysis Catheter 12/30/21 0900 right internal jugular 25 days          Drain                 NG/OG Tube [place] sump 18 Fr. Left nostril -- days         Colostomy 12/18/21 1030 Descending/sigmoid LUQ 37 days          Airway                 Surgical Airway 01/04/22 [place] 20 days                Significant Labs:    CBC/Anemia Profile:  No results for input(s): WBC, HGB, HCT, PLT, MCV, RDW, IRON, FERRITIN, RETIC, FOLATE, POYWCQCG22, OCCULTBLOOD in the last 48 hours.     Chemistries:  No results for input(s): NA, K, CL, CO2, BUN, CREATININE, CALCIUM, ALBUMIN, PROT, BILITOT, ALKPHOS, ALT, AST, GLUCOSE, MG, PHOS in the last 48 hours.    All pertinent labs within the past 24 hours have been reviewed.    Significant Imaging:  I have reviewed all pertinent imaging results/findings within the past 24 hours.

## 2022-01-24 NOTE — PROGRESS NOTES
[facility]  Adult Nutrition  Follow-up Note         Reason for Assessment  Reason For Assessment: RD follow-up  Nutrition Risk Screen: tube feeding or parenteral nutrition,large or nonhealing wound, burn or pressure injury  Malnutrition  Is Patient Malnourished: No  Nutrition Diagnosis  Increased protein Needs   related to Decreased/ impaired skin integrity as evidenced by wounds    Nutrition Diagnosis Status: Improving      Nutrition Risk  Level of Risk/Frequency of Follow-up: high   Chewing or Swallowing Difficulty?: Swallowing difficulty  Estimated/Assessed Needs  RMR ([place]-St. Jeor Equation): 1823.13 Activity Factor: 1 Injury Factor: 1.2   Total Ve: 6.7 mL Temp: 99.1 °F (37.3 °C)Axillary  Weight Used For Calorie Calculations: 101.5 kg (223 lb 12.3 oz)   Energy Need Method: [facility] (modified) Energy Calorie Requirements (kcal): 2068  Weight Used For Protein Calculations: 81 kg (178 lb 9.2 oz) (adjusted body wt)  Protein Requirements:   Estimated Fluid Requirement Method: RDA Method Fluid Requirements (mL): 2068  RDA Method (mL): 2068     Nutrition Prescription / Recommendations  Recommendation/Intervention: Vital AF @ 75ml/hr; H20 flush of 50ml q 4hr  Goals: pt will tolerate tube feeding; pt will meet estimated nutritional needs  Nutrition Goal Status: progressing towards goal  Communication of RD Recs: reviewed with physician  Current Diet Order: Vital AF @ 75ml/hr; H20 flush of 50ml q 4hr  Nutrition Order Comments: TF: Vital AF @ 75ml/hr; H20 flush of 60ml q2hr  Current Nutrition Support Formula Ordered: Vital AF 1.2  Current Nutrition Support Rate Ordered: 75 (ml)  Current Nutrition Support Frequency Ordered: hourly  Recommended Diet: Enteral Nutrition  Vital AF @ 75ml/hr; H20 flush of 50ml q 4hr  Recommended Oral Supplement: No Oral Supplements  Is Nutrition Support Recommended: No  Is Education Recommended: No  Monitor and Evaluation  % current Intake: Enteral Nutrition at  goal  % intake to meet estimated needs: Enteral Nutrition   Food and Nutrient Intake: enteral nutrition intake  Food and Nutrient Adminstration: enteral and parenteral nutrition administration  Anthropometric Measurements: height/length,weight,body mass index  Biochemical Data, Medical Tests and Procedures: electrolyte and renal panel,glucose/endocrine profile  Nutrition-Focused Physical Findings: skin  Enteral Calories (kcal): 2160  Enteral Protein (gm): 135  Enteral (Free Water) Fluid (mL): 1458  Free Water Flush Fluid (mL): 300  Parenteral Calories (kcal): 845  Parenteral Protein (gm): 48  Parenteral Fluid (mL): 960  Lipid Calories (kcals): 500 kcals  Other Calories (kcal): 528 (propofol)  Total Calories (kcal): 2160  Total Calories (kcal/kg): 2612  % Kcal Needs: 100  Total Protein (gm): 135  % Protein Needs: 100  IV Fluid (mL): 1764  Total Fluid Intake (mL): 1758  Energy Calories Required: meeting needs  Protein Required: meeting needs  Fluid Required: meeting needs  Tolerance: tolerating  Current Medical Diagnosis and Past Medical History  Diagnosis: gastrointestinal disease,infection/sepsis  Past Medical History:   Diagnosis Date    Hyperlipidemia     Hypertension      Nutrition/Diet History  Spiritual, Cultural Beliefs, Yarsani Practices, Values that Affect Care: no  Food Allergies: NKFA  Factors Affecting Nutritional Intake: None identified at this time  Lab/Procedures/Meds  No results for input(s): NA, K, BUN, CREATININE, GLU, CALCIUM, ALBUMIN, CL, ALT, AST, PHOS in the last 72 hours.  Last A1c: No results found for: HGBA1C  Lab Results   Component Value Date    RBC 2.61 (L) 01/18/2022    HGB 8.0 (L) 01/18/2022    HCT 22.7 (L) 01/18/2022    MCV 87.0 01/18/2022    MCH 30.7 01/18/2022    MCHC 35.2 01/18/2022     Pertinent Labs Reviewed: reviewed  Pertinent Labs Comments: Hct 22.7, Na 132, Cl 97, BUN 89, Cr 3.34, Alb .8  Pertinent Medications Reviewed: reviewed  Pertinent Medications Comments: heparin,  "insulin  Anthropometrics  Temp: 99.1 °F (37.3 °C)  Height Method: Stated  Height: 5' 11" (180.3 cm)  Height (inches): 71 in  Weight Method: Bed Scale  Weight: 102.1 kg (225 lb 1.4 oz)  Weight (lb): 225.09 lb  Ideal Body Weight (IBW), Male: 172 lb  % Ideal Body Weight, Male (lb): 115.36 %  BMI (Calculated): 31.4  BMI Grade: 25 - 29.9 - overweight     Nutrition by Nursing  Diet/Nutrition Received: tube feeding     Diet/Feeding Assistance: none  Diet/Feeding Tolerance: other (see comments)  Last Bowel Movement: 01/24/22       NG/OG Tube [place] sump 18 Fr. Left nostril-Feeding Type: continuous,by pump       NG/OG Tube [place] sump 18 Fr. Left nostril-Current Rate (mL/hr): 75 mL/hr       NG/OG Tube [place] sump 18 Fr. Left nostril-Goal Rate (mL/hr): 75 mL/hr       NG/OG Tube [place] sump 18 Fr. Left nostril-Formula Name: vital af  Nutrition Follow-Up  RD Follow-up?: Yes  Assessment and Plan  Assessment & plan notes cannot be loaded without a specified hospital service.     "

## 2022-01-24 NOTE — ASSESSMENT & PLAN NOTE
- s/p multiple debridements  - wound vac in place  - ID consulted for antibiotic management: He was treated with rocephin, daptomycin, clindamycin. 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 Amphotericin started on 1/7. The patient went to OR on 1/10 for debridement. Surgery note reviewed and appreciated.  1/12- back to OR today.  1/13- OR note reviewed. No purulence noted.    1/16- tmax 101.5 -- repeat BC   01/17/2022 low-grade temp cultures pending review antibiotics  01/24/2022 stable

## 2022-01-24 NOTE — PLAN OF CARE
Problem: Adult Inpatient Plan of Care  Goal: Plan of Care Review  Outcome: Ongoing, Progressing  Goal: Patient-Specific Goal (Individualized)  Outcome: Ongoing, Progressing  Goal: Optimal Comfort and Wellbeing  Outcome: Ongoing, Progressing     Problem: Bleeding (Sepsis/Septic Shock)  Goal: Absence of Bleeding  Outcome: Ongoing, Progressing     Problem: Glycemic Control Impaired (Sepsis/Septic Shock)  Goal: Blood Glucose Level Within Desired Range  Outcome: Ongoing, Progressing     Problem: Adult Inpatient Plan of Care  Goal: Absence of Hospital-Acquired Illness or Injury  Outcome: Ongoing, Not Progressing  Goal: Readiness for Transition of Care  Outcome: Ongoing, Not Progressing     Problem: Fluid and Electrolyte Imbalance (Acute Kidney Injury/Impairment)  Goal: Fluid and Electrolyte Balance  Outcome: Ongoing, Not Progressing     Problem: Renal Function Impairment (Acute Kidney Injury/Impairment)  Goal: Effective Renal Function  Outcome: Ongoing, Not Progressing     Problem: Fall Injury Risk  Goal: Absence of Fall and Fall-Related Injury  Outcome: Ongoing, Not Progressing     Problem: Inability to Wean (Mechanical Ventilation, Invasive)  Goal: Mechanical Ventilation Liberation  Outcome: Ongoing, Not Progressing

## 2022-01-24 NOTE — ASSESSMENT & PLAN NOTE
Currently hypotensive. Holding any antihypertensives  BP looks a little better this am. Weaning pressor as tolerated  1/21- No longer on pressor.   1/25- hemodynamically stable

## 2022-01-24 NOTE — PROGRESS NOTES
Pharmacokinetic Follow Up: Gentamicin    Assessment of levels:   Pt is a dialysis patient.  His random level of 0.5 is within the desired range of < 2.    Regimen Plan:   Gent 260 mg IV x 1 today with a random gent level ordered for 1/26 at 0800    Drug levels (last 3 results):  No results for input(s): AMIKACINPEAK, AMIKACINTROU, AMIKACINRAND, AMIKACIN in the last 72 hours.    No results for input(s): GENTAMICIN, GENTPEAK, GENTTROUGH, GENT10, GENT12, GENT8, GENTRANDOM in the last 72 hours.    No results for input(s): TOBRA8, TOBRA10, TOBRA12, TOBRARND, TOBRAMYCIN, TOBRAPEAK, TOBRATROUGH, TOBRAMYCINPE, TOBRAMYCINRA, TOBRAMYCINTR in the last 72 hours.    Pharmacy will continue to monitor.    Please contact pharmacy at extension [phone] with any questions regarding this assessment.    Patient brief summary:  Og Garcia is a 66 y.o. male initiated on aminoglycoside therapy for treatment of  pneumonia    Drug Allergies:   Review of patient's allergies indicates:  No Known Allergies    Actual Body Weight:   102.1 kg    Adjust Body Weight:   86 kg    Ideal Body Weight:  75.3 kg    Renal Function:   Estimated Creatinine Clearance: 26.5 mL/min (A) (based on SCr of 3.34 mg/dL (H)).,     Dialysis Method (if applicable):  intermittent HD    CBC (last 72 hours):  No results for input(s): WHITE BLOOD CELL COUNT, HEMOGLOBIN, HEMATOCRIT, PLATELETS, GRAN%, LYMPH%, MONO%, EOSINOPHIL%, BASOPHIL%, DIFFERENTIAL METHOD in the last 72 hours.    Metabolic Panel (last 72 hours):  No results for input(s): SODIUM, POTASSIUM, CHLORIDE, CO2, GLUCOSE, BUN BLD, CREATININE, ALBUMIN, BILIRUBIN TOTAL, ALK PHOS, AST, ALT, MAGNESIUM, PHOSPHORUS in the last 72 hours.    Aminoglycoside Administrations:  aminoglycosides given in last 96 hours                     gentamicin (GARAMYCIN) 260 mg in sodium chloride 0.9% 100 mL IVPB (mg) 260 mg New Bag 01/21/22 1600                    Microbiologic Results:  Microbiology Results (last 7 days)        Procedure Component Value Units Date/Time    Blood culture (site 2) [[id number]] Collected: 01/16/22 1729    Order Status: Completed Specimen: Blood Updated: 01/22/22 0726     Culture, Blood No Growth at 5 days    Culture, Lower Respiratory [[id number]]  (Abnormal)  (Susceptibility) Collected: 01/17/22 1808    Order Status: Completed Specimen: Respiratory from Tracheal Aspirate Updated: 01/20/22 0917     Culture, Lower Respiratory Heavy Growth Pseudomonas aeruginosa     Gram Stain Result Moderate WBC seen      Moderate Gram negative bacilli      Rare Gram positive bacilli      Rare Epithelial cells    Blood culture (site 1) [[id number]]  (Abnormal)  (Susceptibility) Collected: 01/16/22 1726    Order Status: Completed Specimen: Blood Updated: 01/20/22 0758     Culture, Blood Staphylococcus epidermidis     Gram Stain Result Gram positive cocci     Comment: From Aerobic Bottle  This is an appended report. These results have been appended to a previously preliminary verified report.       Narrative:      Site # 1, aerobic and anaerobic  Avoid lines if possible  Site # 1, aerobic and anaerobic  Avoid lines if possible    Verigene Gram-positive Blood Culture Test [[id number]]  (Abnormal) Collected: 01/16/22 1726    Order Status: Completed Specimen: Blood Updated: 01/18/22 1712     Verigene Result Methicillin Resistant Staphylococcus epidermidis     Gram Stain [[id number]] Collected: 01/17/22 1808    Order Status: Canceled Specimen: Respiratory from Tracheal Aspirate Updated: 01/17/22 1928

## 2022-01-25 NOTE — ASSESSMENT & PLAN NOTE
Dialysis Monday   Continue with scheduled hemodialysis for this patient.  1/5/2022 Dialysis done today.  No other changes  1/6/2022 Continue with dialysis on tomorrow.  1/7/2022  Dialysis as scheduled today.  And dialysis on tomorrow.  1/10/2022 Dialysis as scheduled.  1/11/2022 No signs of renal recovery.  Continue with dialysis management  1/12/2022 Continue with scheduled hemodialysis for this patient.  1/13/2022 Dialysis scheduled for tomorrow.  1/14/2022 Dialysis scheduled for Saturday 1/16/2022 Tolerated dialysis on Saturday.  Condition remains critical  1/17/2022  Continue with scheduled hemodialysis for this patient.  The situation remains critical.  1/18/2022 The patient's condition is the same.  1/19/2022 Dialysis as scheduled.  Volume status is stable.  1/20/2022 Continue current management  1/21/2022 Continue with dialysis.  1/24/2022 Dialysis sessions are going well.

## 2022-01-25 NOTE — ASSESSMENT & PLAN NOTE
Continue wound care  1/5/2022.  Continue with wound debridement  1/6/2022 Plans for further wound debridement  1/7/2022 Now s/p wound washout  1/10/2022 Continued would debridement  1/11/2021 Wound care management  1/14/2022  The patient's condition is the same.  Continuing with wound management  1/16/2022 Continue with wound management.  1/19/2022 Continue with wound care.  1/24/2022 Wound care is in place.

## 2022-01-25 NOTE — PROGRESS NOTES
[facility]  Pulmonology  Progress Note    Patient Name: Og Garcia  MRN: [id number]  Admission Date: 12/23/2021  Hospital Length of Stay: 33 days  Code Status: Prior  Attending Provider: Cecil Abernathy DO  Primary Care Provider: Hector Arndt DNP, FNP-C   Principal Problem: Denice gangrene    Subjective:     Interval History: No acute events overnight. Currently hemodynamically stable. Oxygenating adequately on the ventilator. Currently afebrile.    Objective:     Vital Signs (Most Recent):  Temp: 98.7 °F (37.1 °C) (01/24/22 0807)  Pulse: 109 (01/24/22 1125)  Resp: (!) 35 (01/24/22 1125)  BP: 139/70 (01/24/22 1125)  SpO2: 99 % (01/24/22 1125) Vital Signs (24h Range):  Temp:  [98.1 °F (36.7 °C)-99.2 °F (37.3 °C)] 98.7 °F (37.1 °C)  Pulse:  [] 109  Resp:  [13-37] 35  SpO2:  [95 %-100 %] 99 %  BP: ()/(50-95) 139/70     Weight: 102.1 kg (225 lb 1.4 oz)  Body mass index is 31.39 kg/m².      Intake/Output Summary (Last 24 hours) at 1/24/2022 1206  Last data filed at 1/24/2022 1125  Gross per 24 hour   Intake 1860 ml   Output 3001 ml   Net -1141 ml       Physical Exam  Vitals reviewed.   Constitutional:       General: He is not in acute distress.     Appearance: He is ill-appearing.   HENT:      Head: Normocephalic and atraumatic.      Right Ear: External ear normal.      Left Ear: External ear normal.      Nose: Nose normal.      Mouth/Throat:      Mouth: Mucous membranes are moist.      Comments: trach  Eyes:      Conjunctiva/sclera: Conjunctivae normal.      Pupils: Pupils are equal, round, and reactive to light.   Cardiovascular:      Rate and Rhythm: Regular rhythm. Tachycardia present.      Pulses: Normal pulses.      Heart sounds: Normal heart sounds.   Pulmonary:      Effort: No respiratory distress.      Comments: clear breath sounds anteriorly  Abdominal:      Palpations: Abdomen is soft.   Musculoskeletal:         General: Normal range of motion.      Cervical  back: Neck supple.      Right lower leg: Edema present.      Left lower leg: Edema present.   Lymphadenopathy:      Cervical: No cervical adenopathy.   Skin:     General: Skin is warm and dry.   Neurological:      Cranial Nerves: No cranial nerve deficit.      Comments: Remains on mechanical ventilation. Will track at times         Vents:  Vent Mode: A/C (01/24/22 0344)  Set Rate: 14 BPM (01/24/22 0802)  Vt Set: 480 mL (01/24/22 0802)  Pressure Support: 15 cmH20 (01/23/22 2338)  PEEP/CPAP: 5 cmH20 (01/24/22 0802)  Oxygen Concentration (%): 35 (01/24/22 1125)  Peak Airway Pressure: 22 cmH2O (01/24/22 0802)  Total Ve: 6.4 mL (01/24/22 0802)  F/VT Ratio<105 (RSBI): (!) 81.57 (01/23/22 0400)    Lines/Drains/Airways     Peripherally Inserted Central Catheter Line            PICC Double Lumen 01/05/22 left basilic 19 days          Central Venous Catheter Line                 Hemodialysis Catheter 12/30/21 0900 right internal jugular 25 days          Drain                 NG/OG Tube [place] sump 18 Fr. Left nostril -- days         Colostomy 12/18/21 1030 Descending/sigmoid LUQ 37 days          Airway                 Surgical Airway 01/04/22 Shiley 20 days                Significant Labs:    CBC/Anemia Profile:  No results for input(s): WBC, HGB, HCT, PLT, MCV, RDW, IRON, FERRITIN, RETIC, FOLATE, DNFSMYOP56, OCCULTBLOOD in the last 48 hours.     Chemistries:  No results for input(s): NA, K, CL, CO2, BUN, CREATININE, CALCIUM, ALBUMIN, PROT, BILITOT, ALKPHOS, ALT, AST, GLUCOSE, MG, PHOS in the last 48 hours.    All pertinent labs within the past 24 hours have been reviewed.    Significant Imaging:  I have reviewed all pertinent imaging results/findings within the past 24 hours.    Assessment/Plan:     * Denice gangrene  - s/p multiple debridements  - wound vac in place  - ID consulted for antibiotic management: He was treated with rocephin, daptomycin, clindamycin. 12/21- change clindamycin to ampicillin and treat for another  week  - remains on ampicillin, this was changed to merrem 1/9  - pathology with fungal species consistent with mucormycosis initiated on amphotericin- mucormycosis with up to 50% mortality rate  - plan for OR tomorrow  Patient receiving amphotericin having spells of bradycardia will repeat blood cultures and do a echo to look for endocarditis  1/11- echo reviewed and no obvious vegetations. Amphotericin started on 1/7. The patient went to OR on 1/10 for debridement. Surgery note reviewed and appreciated.  1/12- back to OR today.  1/13- OR note reviewed. No purulence noted.    1/16- tmax 101.5 -- repeat BC   01/17/2022 low-grade temp cultures pending review antibiotics  01/24/2022 stable    Disseminated mucormycosis  Defer to Dr. Hall on all antibiotic choices---Her note has been reviewed and is appreciated. On amphotericin, zosyn, and gent    Type 2 diabetes mellitus without complication, without long-term current use of insulin  - continue basal/bolus insulin  -accuchecks are reasonable currently      He had some hypoglycemia. We held levemir. Continue to monitor blood sugar and make adjustments as needed  Blood sugars improving    Acute blood loss anemia  - H/H down to 5.5/16 - pt had bleeding from his HD insertion site yesterday this has now resolved.   - will transfuse 2 units PRBCs on HD  1/2- 9.7/27.5 - repeat CBC in AM  1/4- Hgb is 7.2. Not sure if he will be transfused during surgery. IF not we will try to get  Him transfused with next HD  01/06/2021 transfuse 1 unit of packed red blood cells today  01/10/2021 needs a unit of blood  1/11- repeat H&H in am. Continue to transfuse as needed.  1/13- Hgb is 5. Transfusing today. Post transfusion H&H  1/14- CBC pending for today  1/15- 6/18 - hemodynamically stable, recheck in AM   1/16-- 6.1/17.1 - critical low blood shortage in hospital - holding transfusion until next HD since he is hemodynamically stable and not actively bleeding   01/17/2021 day  transfuse blood on dialysis today  01/18/2020 to hemoglobin up to 9 after transfusion  01/25/2022 no signs of bleeding most recent hemoglobin 8    RAHAT (acute kidney injury)  - nephrology consulted   - no sign of renal recovery.  Continuing with scheduled hemodialysis for this patient.  - tunneled HD line placement 12/30  Continues with dialysis  1/24 nephrology note reviewed and appreciated. No renal recovery. Continue with HD per nephrology            On mechanically assisted ventilation  - 12/14 intubated, tracheostomy 1/4  Currently very ill have not been able to wean  1/11- remains very sick. Not able to tolerate any weaning trials. He is oxygenating adequately on 40%. Prognosis is poor.  1/13- no significant change. He is critically ill and not able to tolerate weaning trials currently   If I can get him off of pressors I will try some SIMV with reduced rate.  1/14- placed him on PSV while I was in the room and he did well. We will try 1 hour bid today  1/16- not doing well on PSV   01/17/2022 patient doing not doing well weaning from the ventilator  01/18/2022 patient has multiple problems ongoing is unable to be weaned from the vent currently  01/20/2022 we will attempt some weaning from the ventilator  1/21- FiO2 40% and peep of 5. Continue to wean as able  1/24- doing well with current trials. Will continue to increase as able  1/25- plan for 3 hours tid today      Hypertension  Currently hypotensive. Holding any antihypertensives  BP looks a little better this am. Weaning pressor as tolerated  1/21- No longer on pressor.   1/25- hemodynamically stable                 Cecil Abernathy, DO  Pulmonology  [facility]

## 2022-01-25 NOTE — PLAN OF CARE
Problem: Adult Inpatient Plan of Care  Goal: Plan of Care Review  Outcome: Ongoing, Progressing     Problem: Infection Progression (Sepsis/Septic Shock)  Goal: Absence of Infection Signs and Symptoms  Outcome: Ongoing, Progressing     Problem: Infection  Goal: Absence of Infection Signs and Symptoms  Outcome: Ongoing, Progressing     Problem: Fall Injury Risk  Goal: Absence of Fall and Fall-Related Injury  Outcome: Ongoing, Progressing     Problem: Inability to Wean (Mechanical Ventilation, Invasive)  Goal: Mechanical Ventilation Liberation  Outcome: Ongoing, Progressing     Problem: Skin and Tissue Injury (Artificial Airway)  Goal: Absence of Device-Related Skin or Tissue Injury  Outcome: Ongoing, Progressing

## 2022-01-25 NOTE — PROGRESS NOTES
[facility]  Nephrology  Progress Note    Patient Name: Og Garcia  MRN: [id number]  Admission Date: 12/23/2021  Hospital Length of Stay: 32 days  Attending Provider: Cecil Abernathy DO   Primary Care Physician: Hector Arndt DNP, FNP-C  Principal Problem:Denice gangrene    Subjective:     HPI: The patient is known from the previous nephrology consult at [facility].  He is no at Specialty and continues to need dialysis support.      Interval History: The patient is resting.  He is continuing to make progress.  He tolerated dialysis today.    Review of patient's allergies indicates:  No Known Allergies  Current Facility-Administered Medications   Medication Frequency    0.9%  NaCl infusion (for blood administration) Q24H PRN    0.9%  NaCl infusion PRN    0.9%  NaCl infusion PRN    acetaminophen tablet 1,000 mg Q6H PRN    acetaminophen tablet 500 mg Q6H PRN    albuterol nebulizer solution 2.5 mg Q4H PRN    amphotericin B liposome (AMBISOME) 450 mg in dextrose 5 % 450 mL IVPB Q24H    bisacodyL EC tablet 10 mg Daily PRN    dextromethorphan-guaiFENesin  mg/5 ml liquid 10 mL Q6H PRN    dextrose 50% injection 12.5 g PRN    diltiaZEM tablet 30 mg Q6H    fentaNYL 2500 mcg in D5W 250 mL infusion premix (titrating) (conc: 10 mcg/mL) PRN    gentamicin - pharmacy to dose pharmacy to manage frequency    glucagon (human recombinant) injection 1 mg PRN    heparin (porcine) injection 4,000 Units PRN    heparin (porcine) injection 5,000 Units Q8H    insulin aspart U-100 injection 1-10 Units Q6H    metoprolol injection 5 mg Q5 Min PRN    NORepinephrine 32 mg in dextrose 5 % 250 mL infusion PRN    ondansetron injection 8 mg Q6H PRN    pantoprazole suspension 40 mg Daily    piperacillin-tazobactam (ZOSYN) 4.5 g in dextrose 5 % in water (D5W) 5 % 100 mL IVPB (MB+) Q12H    [START ON 1/25/2022] sevelamer carbonate pwpk 0.8 g TID WM    simethicone chewable tablet 80 mg TID PRN     ticagrelor tablet 90 mg BID    traZODone tablet 50 mg Nightly PRN       Objective:     Vital Signs (Most Recent):  Temp: 99.7 °F (37.6 °C) (01/24/22 1600)  Pulse: 103 (01/24/22 1645)  Resp: 19 (01/24/22 1645)  BP: 128/71 (01/24/22 1645)  SpO2: 100 % (01/24/22 1645)  O2 Device (Oxygen Therapy): ventilator (01/24/22 1130) Vital Signs (24h Range):  Temp:  [98.7 °F (37.1 °C)-99.7 °F (37.6 °C)] 99.7 °F (37.6 °C)  Pulse:  [] 103  Resp:  [15-37] 19  SpO2:  [96 %-100 %] 100 %  BP: (106-156)/(51-89) 128/71     Weight: 102.1 kg (225 lb 1.4 oz) (01/24/22 0600)  Body mass index is 31.39 kg/m².  Body surface area is 2.26 meters squared.    I/O last 3 completed shifts:  In: 1620 [I.V.:10; NG/GT:1410; IV Piggyback:200]  Out: 301 [Urine:201; Stool:100]    Physical Exam  Vitals reviewed.   Cardiovascular:      Rate and Rhythm: Regular rhythm.      Heart sounds: Normal heart sounds.   Pulmonary:      Breath sounds: Normal breath sounds.   Abdominal:      Palpations: Abdomen is soft.   Neurological:      Mental Status: He is alert.         Significant Labs:  BMP:   Recent Labs   Lab 01/18/22  0605   GLU 85   *   K 4.6   CL 97*   CO2 22   BUN 59*   CREATININE 3.34*   CALCIUM 7.1*   MG 2.2     CBC:   Recent Labs   Lab 01/18/22  0954   WBC 13.43*   RBC 2.61*   HGB 8.0*   HCT 22.7*      MCV 87.0   MCH 30.7   MCHC 35.2        Significant Imaging:  Labs: Reviewed    Assessment/Plan:     * Denice gangrene  Continue wound care  1/5/2022.  Continue with wound debridement  1/6/2022 Plans for further wound debridement  1/7/2022 Now s/p wound washout  1/10/2022 Continued would debridement  1/11/2021 Wound care management  1/14/2022  The patient's condition is the same.  Continuing with wound management  1/16/2022 Continue with wound management.  1/19/2022 Continue with wound care.  1/24/2022 Wound care is in place.    RAHAT (acute kidney injury)  Dialysis Monday   Continue with scheduled hemodialysis for this  patient.  1/5/2022 Dialysis done today.  No other changes  1/6/2022 Continue with dialysis on tomorrow.  1/7/2022  Dialysis as scheduled today.  And dialysis on tomorrow.  1/10/2022 Dialysis as scheduled.  1/11/2022 No signs of renal recovery.  Continue with dialysis management  1/12/2022 Continue with scheduled hemodialysis for this patient.  1/13/2022 Dialysis scheduled for tomorrow.  1/14/2022 Dialysis scheduled for Saturday 1/16/2022 Tolerated dialysis on Saturday.  Condition remains critical  1/17/2022  Continue with scheduled hemodialysis for this patient.  The situation remains critical.  1/18/2022 The patient's condition is the same.  1/19/2022 Dialysis as scheduled.  Volume status is stable.  1/20/2022 Continue current management  1/21/2022 Continue with dialysis.  1/24/2022 Dialysis sessions are going well.        Thank you for your consult. I will follow-up with patient. Please contact us if you have any additional questions.    Edwin Pryor Jr, MD  Nephrology  [facility]

## 2022-01-25 NOTE — ASSESSMENT & PLAN NOTE
Dialysis Monday   Continue with scheduled hemodialysis for this patient.  1/5/2022 Dialysis done today.  No other changes  1/6/2022 Continue with dialysis on tomorrow.  1/7/2022  Dialysis as scheduled today.  And dialysis on tomorrow.  1/10/2022 Dialysis as scheduled.  1/11/2022 No signs of renal recovery.  Continue with dialysis management  1/12/2022 Continue with scheduled hemodialysis for this patient.  1/13/2022 Dialysis scheduled for tomorrow.  1/14/2022 Dialysis scheduled for Saturday 1/16/2022 Tolerated dialysis on Saturday.  Condition remains critical  1/17/2022  Continue with scheduled hemodialysis for this patient.  The situation remains critical.  1/18/2022 The patient's condition is the same.  1/19/2022 Dialysis as scheduled.  Volume status is stable.  1/20/2022 Continue current management  1/21/2022 Continue with dialysis.  1/24/2022 Dialysis sessions are going well.  1/25/2022 Continue with support

## 2022-01-25 NOTE — PROGRESS NOTES
[facility]  Nephrology  Progress Note    Patient Name: Og Garcia  MRN: [id number]  Admission Date: 12/23/2021  Hospital Length of Stay: 33 days  Attending Provider: Cecil Abernathy DO   Primary Care Physician: Hector Arndt DNP, FNP-C  Principal Problem:Denice gangrene    Subjective:     HPI: The patient is known from the previous nephrology consult at [facility].  He is no at Specialty and continues to need dialysis support.      Interval History: No acute changes for this patient.  He is resting.  He tolerated dialysis on yesterday.    Review of patient's allergies indicates:  No Known Allergies  Current Facility-Administered Medications   Medication Frequency    0.9%  NaCl infusion (for blood administration) Q24H PRN    0.9%  NaCl infusion PRN    0.9%  NaCl infusion PRN    acetaminophen tablet 1,000 mg Q6H PRN    acetaminophen tablet 500 mg Q6H PRN    albuterol nebulizer solution 2.5 mg Q4H PRN    amphotericin B liposome (AMBISOME) 450 mg in dextrose 5 % 450 mL IVPB Q24H    bisacodyL EC tablet 10 mg Daily PRN    dextromethorphan-guaiFENesin  mg/5 ml liquid 10 mL Q6H PRN    dextrose 50% injection 12.5 g PRN    diltiaZEM tablet 30 mg Q6H    fentaNYL 2500 mcg in D5W 250 mL infusion premix (titrating) (conc: 10 mcg/mL) PRN    gentamicin - pharmacy to dose pharmacy to manage frequency    glucagon (human recombinant) injection 1 mg PRN    heparin (porcine) injection 4,000 Units PRN    heparin (porcine) injection 5,000 Units Q8H    insulin aspart U-100 injection 1-10 Units Q6H    metoprolol injection 5 mg Q5 Min PRN    NORepinephrine 32 mg in dextrose 5 % 250 mL infusion PRN    ondansetron injection 8 mg Q6H PRN    pantoprazole suspension 40 mg Daily    piperacillin-tazobactam (ZOSYN) 4.5 g in dextrose 5 % in water (D5W) 5 % 100 mL IVPB (MB+) Q12H    sevelamer carbonate pwpk 0.8 g TID WM    simethicone chewable tablet 80 mg TID PRN    ticagrelor tablet 90  mg BID    traZODone tablet 50 mg Nightly PRN       Objective:     Vital Signs (Most Recent):  Temp: 99.4 °F (37.4 °C) (01/25/22 1200)  Pulse: 104 (01/25/22 1200)  Resp: (!) 23 (01/25/22 1200)  BP: 131/68 (01/25/22 1200)  SpO2: 100 % (01/25/22 1200)  O2 Device (Oxygen Therapy): ventilator (01/25/22 0800) Vital Signs (24h Range):  Temp:  [98 °F (36.7 °C)-99.7 °F (37.6 °C)] 99.4 °F (37.4 °C)  Pulse:  [] 104  Resp:  [12-35] 23  SpO2:  [97 %-100 %] 100 %  BP: (114-149)/(53-82) 131/68     Weight: 102.1 kg (225 lb 1.4 oz) (01/24/22 0600)  Body mass index is 31.39 kg/m².  Body surface area is 2.26 meters squared.    I/O last 3 completed shifts:  In: 2100 [Other:500; NG/GT:1050; IV Piggyback:550]  Out: 3300 [Other:3000; Stool:300]    Physical Exam  Vitals reviewed.   Cardiovascular:      Rate and Rhythm: Regular rhythm.      Heart sounds: Normal heart sounds.   Pulmonary:      Comments: ventilated  Abdominal:      Palpations: Abdomen is soft.   Neurological:      Mental Status: He is alert.         Significant Labs:  BMP: No results for input(s): GLU, NA, K, CL, CO2, BUN, CREATININE, CALCIUM, MG in the last 168 hours.  CBC: No results for input(s): WBC, RBC, HGB, HCT, PLT, MCV, MCH, MCHC in the last 168 hours.     Significant Imaging:  Labs: Reviewed    Assessment/Plan:     RAHAT (acute kidney injury)  Dialysis Monday   Continue with scheduled hemodialysis for this patient.  1/5/2022 Dialysis done today.  No other changes  1/6/2022 Continue with dialysis on tomorrow.  1/7/2022  Dialysis as scheduled today.  And dialysis on tomorrow.  1/10/2022 Dialysis as scheduled.  1/11/2022 No signs of renal recovery.  Continue with dialysis management  1/12/2022 Continue with scheduled hemodialysis for this patient.  1/13/2022 Dialysis scheduled for tomorrow.  1/14/2022 Dialysis scheduled for Saturday 1/16/2022 Tolerated dialysis on Saturday.  Condition remains critical  1/17/2022  Continue with scheduled hemodialysis for this  patient.  The situation remains critical.  1/18/2022 The patient's condition is the same.  1/19/2022 Dialysis as scheduled.  Volume status is stable.  1/20/2022 Continue current management  1/21/2022 Continue with dialysis.  1/24/2022 Dialysis sessions are going well.  1/25/2022 Continue with support    Respiratory failure  Sacral wound    Thank you for your consult. I will follow-up with patient. Please contact us if you have any additional questions.    dEwin Pryor Jr, MD  Nephrology  [facility]

## 2022-01-25 NOTE — SUBJECTIVE & OBJECTIVE
Interval History: No acute changes for this patient.  He is resting.  He tolerated dialysis on yesterday.    Review of patient's allergies indicates:  No Known Allergies  Current Facility-Administered Medications   Medication Frequency    0.9%  NaCl infusion (for blood administration) Q24H PRN    0.9%  NaCl infusion PRN    0.9%  NaCl infusion PRN    acetaminophen tablet 1,000 mg Q6H PRN    acetaminophen tablet 500 mg Q6H PRN    albuterol nebulizer solution 2.5 mg Q4H PRN    amphotericin B liposome (AMBISOME) 450 mg in dextrose 5 % 450 mL IVPB Q24H    bisacodyL EC tablet 10 mg Daily PRN    dextromethorphan-guaiFENesin  mg/5 ml liquid 10 mL Q6H PRN    dextrose 50% injection 12.5 g PRN    diltiaZEM tablet 30 mg Q6H    fentaNYL 2500 mcg in D5W 250 mL infusion premix (titrating) (conc: 10 mcg/mL) PRN    gentamicin - pharmacy to dose pharmacy to manage frequency    glucagon (human recombinant) injection 1 mg PRN    heparin (porcine) injection 4,000 Units PRN    heparin (porcine) injection 5,000 Units Q8H    insulin aspart U-100 injection 1-10 Units Q6H    metoprolol injection 5 mg Q5 Min PRN    NORepinephrine 32 mg in dextrose 5 % 250 mL infusion PRN    ondansetron injection 8 mg Q6H PRN    pantoprazole suspension 40 mg Daily    piperacillin-tazobactam (ZOSYN) 4.5 g in dextrose 5 % in water (D5W) 5 % 100 mL IVPB (MB+) Q12H    sevelamer carbonate pwpk 0.8 g TID WM    simethicone chewable tablet 80 mg TID PRN    ticagrelor tablet 90 mg BID    traZODone tablet 50 mg Nightly PRN       Objective:     Vital Signs (Most Recent):  Temp: 99.4 °F (37.4 °C) (01/25/22 1200)  Pulse: 104 (01/25/22 1200)  Resp: (!) 23 (01/25/22 1200)  BP: 131/68 (01/25/22 1200)  SpO2: 100 % (01/25/22 1200)  O2 Device (Oxygen Therapy): ventilator (01/25/22 0800) Vital Signs (24h Range):  Temp:  [98 °F (36.7 °C)-99.7 °F (37.6 °C)] 99.4 °F (37.4 °C)  Pulse:  [] 104  Resp:  [12-35] 23  SpO2:  [97 %-100 %] 100 %  BP:  (114-149)/(53-82) 131/68     Weight: 102.1 kg (225 lb 1.4 oz) (01/24/22 0600)  Body mass index is 31.39 kg/m².  Body surface area is 2.26 meters squared.    I/O last 3 completed shifts:  In: 2100 [Other:500; NG/GT:1050; IV Piggyback:550]  Out: 3300 [Other:3000; Stool:300]    Physical Exam  Vitals reviewed.   Cardiovascular:      Rate and Rhythm: Regular rhythm.      Heart sounds: Normal heart sounds.   Pulmonary:      Comments: ventilated  Abdominal:      Palpations: Abdomen is soft.   Neurological:      Mental Status: He is alert.         Significant Labs:  BMP: No results for input(s): GLU, NA, K, CL, CO2, BUN, CREATININE, CALCIUM, MG in the last 168 hours.  CBC: No results for input(s): WBC, RBC, HGB, HCT, PLT, MCV, MCH, MCHC in the last 168 hours.     Significant Imaging:  Labs: Reviewed

## 2022-01-25 NOTE — SUBJECTIVE & OBJECTIVE
Interval History: The patient is resting.  He is continuing to make progress.  He tolerated dialysis today.    Review of patient's allergies indicates:  No Known Allergies  Current Facility-Administered Medications   Medication Frequency    0.9%  NaCl infusion (for blood administration) Q24H PRN    0.9%  NaCl infusion PRN    0.9%  NaCl infusion PRN    acetaminophen tablet 1,000 mg Q6H PRN    acetaminophen tablet 500 mg Q6H PRN    albuterol nebulizer solution 2.5 mg Q4H PRN    amphotericin B liposome (AMBISOME) 450 mg in dextrose 5 % 450 mL IVPB Q24H    bisacodyL EC tablet 10 mg Daily PRN    dextromethorphan-guaiFENesin  mg/5 ml liquid 10 mL Q6H PRN    dextrose 50% injection 12.5 g PRN    diltiaZEM tablet 30 mg Q6H    fentaNYL 2500 mcg in D5W 250 mL infusion premix (titrating) (conc: 10 mcg/mL) PRN    gentamicin - pharmacy to dose pharmacy to manage frequency    glucagon (human recombinant) injection 1 mg PRN    heparin (porcine) injection 4,000 Units PRN    heparin (porcine) injection 5,000 Units Q8H    insulin aspart U-100 injection 1-10 Units Q6H    metoprolol injection 5 mg Q5 Min PRN    NORepinephrine 32 mg in dextrose 5 % 250 mL infusion PRN    ondansetron injection 8 mg Q6H PRN    pantoprazole suspension 40 mg Daily    piperacillin-tazobactam (ZOSYN) 4.5 g in dextrose 5 % in water (D5W) 5 % 100 mL IVPB (MB+) Q12H    [START ON 1/25/2022] sevelamer carbonate pwpk 0.8 g TID WM    simethicone chewable tablet 80 mg TID PRN    ticagrelor tablet 90 mg BID    traZODone tablet 50 mg Nightly PRN       Objective:     Vital Signs (Most Recent):  Temp: 99.7 °F (37.6 °C) (01/24/22 1600)  Pulse: 103 (01/24/22 1645)  Resp: 19 (01/24/22 1645)  BP: 128/71 (01/24/22 1645)  SpO2: 100 % (01/24/22 1645)  O2 Device (Oxygen Therapy): ventilator (01/24/22 1130) Vital Signs (24h Range):  Temp:  [98.7 °F (37.1 °C)-99.7 °F (37.6 °C)] 99.7 °F (37.6 °C)  Pulse:  [] 103  Resp:  [15-37] 19  SpO2:  [96  %-100 %] 100 %  BP: (106-156)/(51-89) 128/71     Weight: 102.1 kg (225 lb 1.4 oz) (01/24/22 0600)  Body mass index is 31.39 kg/m².  Body surface area is 2.26 meters squared.    I/O last 3 completed shifts:  In: 1620 [I.V.:10; NG/GT:1410; IV Piggyback:200]  Out: 301 [Urine:201; Stool:100]    Physical Exam  Vitals reviewed.   Cardiovascular:      Rate and Rhythm: Regular rhythm.      Heart sounds: Normal heart sounds.   Pulmonary:      Breath sounds: Normal breath sounds.   Abdominal:      Palpations: Abdomen is soft.   Neurological:      Mental Status: He is alert.         Significant Labs:  BMP:   Recent Labs   Lab 01/18/22  0605   GLU 85   *   K 4.6   CL 97*   CO2 22   BUN 59*   CREATININE 3.34*   CALCIUM 7.1*   MG 2.2     CBC:   Recent Labs   Lab 01/18/22  0954   WBC 13.43*   RBC 2.61*   HGB 8.0*   HCT 22.7*      MCV 87.0   MCH 30.7   MCHC 35.2        Significant Imaging:  Labs: Reviewed

## 2022-01-25 NOTE — PLAN OF CARE
Problem: Device-Related Complication Risk (Mechanical Ventilation, Invasive)  Goal: Optimal Device Function  Outcome: Ongoing, Progressing     Problem: Inability to Wean (Mechanical Ventilation, Invasive)  Goal: Mechanical Ventilation Liberation  Outcome: Ongoing, Progressing     Problem: Skin and Tissue Injury (Mechanical Ventilation, Invasive)  Goal: Absence of Device-Related Skin and Tissue Injury  Outcome: Ongoing, Progressing     Problem: Ventilator-Induced Lung Injury (Mechanical Ventilation, Invasive)  Goal: Absence of Ventilator-Induced Lung Injury  Outcome: Ongoing, Progressing     Problem: Communication Impairment (Artificial Airway)  Goal: Effective Communication  Outcome: Ongoing, Progressing     Problem: Device-Related Complication Risk (Artificial Airway)  Goal: Optimal Device Function  Outcome: Ongoing, Progressing     Problem: Skin and Tissue Injury (Artificial Airway)  Goal: Absence of Device-Related Skin or Tissue Injury  Outcome: Ongoing, Progressing

## 2022-01-26 NOTE — ASSESSMENT & PLAN NOTE
- nephrology consulted   - no sign of renal recovery.  Continuing with scheduled hemodialysis for this patient.  - tunneled HD line placement 12/30  Continues with dialysis  1/26 nephrology note reviewed and appreciated. No renal recovery. Continue with HD per nephrology

## 2022-01-26 NOTE — DIALYSIS ROUNDING
"          Nephrology Department            NAME: Og Wolf [place]   YOB: 1955  MRN: [id number]  NOTE DATE: 01/26/2022       Seen on dialysis in TRAV.  He is tolerating the procedure.    Patient complains:  None.      REVIEW OF SYSTEMS:  he reports no shortness of breath, nausea or vomiting. No acute changes.    VITALS:  height is 5' 11" (1.803 m) and weight is 102.1 kg (225 lb 1.4 oz). His temperature is 98.6 °F (37 °C). His blood pressure is 105/54 (abnormal) and his pulse is 71. His respiration is 17 and oxygen saturation is 100%.  Hemodialysis documentation flowsheet reviewed with dialysis nurse, including weight and vitals.     NAD.  Ventilated Lungs clear.  Heart regular, no rub.  Abdomen soft, nontender, positive bowl sounds  No edema.    No results for input(s): NA, K, CL, CO2, BUN, CREATININE, GLUCOSE, CALCIUM, PHOS in the last 168 hours.    Invalid input(s): EGFR, LABALBU  No results for input(s): WBC, HGB, HCT, PLT in the last 168 hours.    ASSESSMENT/PLAN:  Continue with scheduled hemodialysis for this patient.    Edwin Pryor Jr, MD  "

## 2022-01-26 NOTE — ASSESSMENT & PLAN NOTE
- 12/14 intubated, tracheostomy 1/4  Currently very ill have not been able to wean  1/11- remains very sick. Not able to tolerate any weaning trials. He is oxygenating adequately on 40%. Prognosis is poor.  1/13- no significant change. He is critically ill and not able to tolerate weaning trials currently   If I can get him off of pressors I will try some SIMV with reduced rate.  1/14- placed him on PSV while I was in the room and he did well. We will try 1 hour bid today  1/16- not doing well on PSV   01/17/2022 patient doing not doing well weaning from the ventilator  01/18/2022 patient has multiple problems ongoing is unable to be weaned from the vent currently  01/20/2022 we will attempt some weaning from the ventilator  1/21- FiO2 40% and peep of 5. Continue to wean as able  1/24- doing well with current trials. Will continue to increase as able  1/25- plan for 3 hours tid today  1/26- continue to increase as tolerated. Will try to get 4 hours tid today

## 2022-01-26 NOTE — PROGRESS NOTES
[facility]  Pulmonology  Progress Note    Patient Name: Og Garcia  MRN: [id number]  Admission Date: 12/23/2021  Hospital Length of Stay: 34 days  Code Status: Prior  Attending Provider: Cecil Abernathy DO  Primary Care Provider: Hector Arndt DNP, FNP-C   Principal Problem: Denice gangrene    Subjective:     Interval History: No acute events overnight. Currently hemodynamically stable. Oxygenating adequately on the ventilator. Currently afebrile.    Objective:     Vital Signs (Most Recent):  Temp: 98.9 °F (37.2 °C) (01/26/22 0400)  Pulse: 101 (01/26/22 0600)  Resp: (!) 22 (01/26/22 0600)  BP: 133/70 (01/26/22 0600)  SpO2: 99 % (01/26/22 0600) Vital Signs (24h Range):  Temp:  [98.1 °F (36.7 °C)-99.9 °F (37.7 °C)] 98.9 °F (37.2 °C)  Pulse:  [] 101  Resp:  [14-30] 22  SpO2:  [95 %-100 %] 99 %  BP: (113-156)/(52-85) 133/70     Weight: 102.1 kg (225 lb 1.4 oz)  Body mass index is 31.39 kg/m².      Intake/Output Summary (Last 24 hours) at 1/26/2022 1137  Last data filed at 1/25/2022 2105  Gross per 24 hour   Intake 150 ml   Output --   Net 150 ml       Physical Exam  Vitals reviewed.   Constitutional:       General: He is not in acute distress.     Appearance: He is ill-appearing.   HENT:      Head: Normocephalic and atraumatic.      Right Ear: External ear normal.      Left Ear: External ear normal.      Nose: Nose normal.      Mouth/Throat:      Mouth: Mucous membranes are moist.      Comments: trach  Eyes:      Conjunctiva/sclera: Conjunctivae normal.      Pupils: Pupils are equal, round, and reactive to light.   Cardiovascular:      Rate and Rhythm: Regular rhythm. Tachycardia present.      Pulses: Normal pulses.      Heart sounds: Normal heart sounds.   Pulmonary:      Effort: No respiratory distress.      Comments: clear breath sounds anteriorly  Abdominal:      Palpations: Abdomen is soft.   Musculoskeletal:         General: Normal range of motion.      Cervical back:  Neck supple.      Right lower leg: Edema present.      Left lower leg: Edema present.   Lymphadenopathy:      Cervical: No cervical adenopathy.   Skin:     General: Skin is warm and dry.   Neurological:      Cranial Nerves: No cranial nerve deficit.      Comments: Remains on mechanical ventilation. Will track at times         Vents:  Vent Mode: A/C (01/26/22 0516)  Set Rate: 16 BPM (01/26/22 0516)  Vt Set: 480 mL (01/26/22 0516)  Pressure Support: 15 cmH20 (01/25/22 2016)  PEEP/CPAP: 5 cmH20 (01/26/22 0516)  Oxygen Concentration (%): 35 (01/26/22 0800)  Peak Airway Pressure: 21 cmH2O (01/26/22 0516)  Total Ve: 6.9 mL (01/26/22 0516)  F/VT Ratio<105 (RSBI): (!) 53.62 (01/25/22 0800)    Lines/Drains/Airways     Peripherally Inserted Central Catheter Line            PICC Double Lumen 01/05/22 left basilic 21 days          Central Venous Catheter Line                 Hemodialysis Catheter 12/30/21 0900 right internal jugular 27 days          Drain                 NG/OG Tube [place] sump 18 Fr. Left nostril -- days         Colostomy 12/18/21 1030 Descending/sigmoid LUQ 39 days          Airway                 Surgical Airway 01/04/22 Shiley 22 days                Significant Labs:    CBC/Anemia Profile:  No results for input(s): WBC, HGB, HCT, PLT, MCV, RDW, IRON, FERRITIN, RETIC, FOLATE, [id number], OCCULTBLOOD in the last 48 hours.     Chemistries:  No results for input(s): NA, K, CL, CO2, BUN, CREATININE, CALCIUM, ALBUMIN, PROT, BILITOT, ALKPHOS, ALT, AST, GLUCOSE, MG, PHOS in the last 48 hours.    All pertinent labs within the past 24 hours have been reviewed.    Significant Imaging:  I have reviewed all pertinent imaging results/findings within the past 24 hours.    Assessment/Plan:     Disseminated mucormycosis  Defer to Dr. Hall on all antibiotic choices---Her note has been reviewed and is appreciated. On amphotericin, zosyn, and gent    Type 2 diabetes mellitus without complication, without long-term current  use of insulin  - continue basal/bolus insulin  -accuchecks are reasonable currently      He had some hypoglycemia. We held levemir. Continue to monitor blood sugar and make adjustments as needed  Blood sugars improving    Acute blood loss anemia  - H/H down to 5.5/16 - pt had bleeding from his HD insertion site yesterday this has now resolved.   - will transfuse 2 units PRBCs on HD  1/2- 9.7/27.5 - repeat CBC in AM  1/4- Hgb is 7.2. Not sure if he will be transfused during surgery. IF not we will try to get  Him transfused with next HD  01/06/2021 transfuse 1 unit of packed red blood cells today  01/10/2021 needs a unit of blood  1/11- repeat H&H in am. Continue to transfuse as needed.  1/13- Hgb is 5. Transfusing today. Post transfusion H&H  1/14- CBC pending for today  1/15- 6/18 - hemodynamically stable, recheck in AM   1/16-- 6.1/17.1 - critical low blood shortage in hospital - holding transfusion until next HD since he is hemodynamically stable and not actively bleeding   01/17/2021 day transfuse blood on dialysis today  01/18/2020 to hemoglobin up to 9 after transfusion  01/25/2022 no signs of bleeding most recent hemoglobin 8    RAHAT (acute kidney injury)  - nephrology consulted   - no sign of renal recovery.  Continuing with scheduled hemodialysis for this patient.  - tunneled HD line placement 12/30  Continues with dialysis  1/26 nephrology note reviewed and appreciated. No renal recovery. Continue with HD per nephrology            On mechanically assisted ventilation  - 12/14 intubated, tracheostomy 1/4  Currently very ill have not been able to wean  1/11- remains very sick. Not able to tolerate any weaning trials. He is oxygenating adequately on 40%. Prognosis is poor.  1/13- no significant change. He is critically ill and not able to tolerate weaning trials currently   If I can get him off of pressors I will try some SIMV with reduced rate.  1/14- placed him on PSV while I was in the room and he did  well. We will try 1 hour bid today  1/16- not doing well on PSV   01/17/2022 patient doing not doing well weaning from the ventilator  01/18/2022 patient has multiple problems ongoing is unable to be weaned from the vent currently  01/20/2022 we will attempt some weaning from the ventilator  1/21- FiO2 40% and peep of 5. Continue to wean as able  1/24- doing well with current trials. Will continue to increase as able  1/25- plan for 3 hours tid today  1/26- continue to increase as tolerated. Will try to get 4 hours tid today      Hypertension  Currently hypotensive. Holding any antihypertensives  BP looks a little better this am. Weaning pressor as tolerated  1/21- No longer on pressor.   1/26- hemodynamically stable                 Cecil Abernathy, DO  Pulmonology  [facility]

## 2022-01-26 NOTE — PROGRESS NOTES
Pharmacokinetic Follow Up: Gentamicin    Assessment of levels:   Assessment of levels using dosing based on random levels.    Regimen Plan:   Will hold dose after HD today and repeat random level Friday morning.    Drug levels (last 3 results):  No results for input(s): AMIKACINPEAK, AMIKACINTROU, AMIKACINRAND, AMIKACIN in the last 72 hours.    No results for input(s): GENTAMICIN, GENTPEAK, GENTTROUGH, GENT10, GENT12, GENT8, GENTRANDOM in the last 72 hours.    No results for input(s): TOBRA8, TOBRA10, TOBRA12, TOBRARND, TOBRAMYCIN, TOBRAPEAK, TOBRATROUGH, TOBRAMYCINPE, TOBRAMYCINRA, TOBRAMYCINTR in the last 72 hours.    Pharmacy will continue to monitor.    Please contact pharmacy at extension [phone] with any questions regarding this assessment.    Thank you for the consult,   Cisco Barnett        Drug Allergies:   Review of patient's allergies indicates:  No Known Allergies    Actual Body Weight:   102.1 kg    Adjust Body Weight:   86 kg    Ideal Body Weight:  75.3 kg    Renal Function:   CrCl cannot be calculated (Patient's most recent lab result is older than the maximum 7 days allowed.).,     Dialysis Method (if applicable):  intermittent HD    CBC (last 72 hours):  No results for input(s): WHITE BLOOD CELL COUNT, HEMOGLOBIN, HEMATOCRIT, PLATELETS, GRAN%, LYMPH%, MONO%, EOSINOPHIL%, BASOPHIL%, DIFFERENTIAL METHOD in the last 72 hours.    Metabolic Panel (last 72 hours):  No results for input(s): SODIUM, POTASSIUM, CHLORIDE, CO2, GLUCOSE, BUN BLD, CREATININE, ALBUMIN, BILIRUBIN TOTAL, ALK PHOS, AST, ALT, MAGNESIUM, PHOSPHORUS in the last 72 hours.    Aminoglycoside Administrations:  aminoglycosides given in last 96 hours                     gentamicin (GARAMYCIN) 260 mg in sodium chloride 0.9% 100 mL IVPB (mg) 260 mg New Bag 01/24/22 1720                    Microbiologic Results:  Microbiology Results (last 7 days)       Procedure Component Value Units Date/Time    Blood culture (site 2) [[id number]] Collected:  01/16/22 1729    Order Status: Completed Specimen: Blood Updated: 01/22/22 0726     Culture, Blood No Growth at 5 days    Culture, Lower Respiratory [[id number]]  (Abnormal)  (Susceptibility) Collected: 01/17/22 1808    Order Status: Completed Specimen: Respiratory from Tracheal Aspirate Updated: 01/20/22 0917     Culture, Lower Respiratory Heavy Growth Pseudomonas aeruginosa     Gram Stain Result Moderate WBC seen      Moderate Gram negative bacilli      Rare Gram positive bacilli      Rare Epithelial cells    Blood culture (site 1) [[id number]]  (Abnormal)  (Susceptibility) Collected: 01/16/22 1726    Order Status: Completed Specimen: Blood Updated: 01/20/22 0758     Culture, Blood Staphylococcus epidermidis     Gram Stain Result Gram positive cocci     Comment: From Aerobic Bottle  This is an appended report. These results have been appended to a previously preliminary verified report.       Narrative:      Site # 1, aerobic and anaerobic  Avoid lines if possible  Site # 1, aerobic and anaerobic  Avoid lines if possible

## 2022-01-26 NOTE — PLAN OF CARE
Problem: Adjustment to Illness (Sepsis/Septic Shock)  Goal: Optimal Coping  Outcome: Ongoing, Progressing     Problem: Fluid and Electrolyte Imbalance (Acute Kidney Injury/Impairment)  Goal: Fluid and Electrolyte Balance  Outcome: Ongoing, Progressing     Problem: Renal Function Impairment (Acute Kidney Injury/Impairment)  Goal: Effective Renal Function  Outcome: Ongoing, Progressing

## 2022-01-26 NOTE — ASSESSMENT & PLAN NOTE
Currently hypotensive. Holding any antihypertensives  BP looks a little better this am. Weaning pressor as tolerated  1/21- No longer on pressor.   1/26- hemodynamically stable

## 2022-01-26 NOTE — SUBJECTIVE & OBJECTIVE
Interval History: No acute events overnight. Currently hemodynamically stable. Oxygenating adequately on the ventilator. Currently afebrile.    Objective:     Vital Signs (Most Recent):  Temp: 98.9 °F (37.2 °C) (01/26/22 0400)  Pulse: 101 (01/26/22 0600)  Resp: (!) 22 (01/26/22 0600)  BP: 133/70 (01/26/22 0600)  SpO2: 99 % (01/26/22 0600) Vital Signs (24h Range):  Temp:  [98.1 °F (36.7 °C)-99.9 °F (37.7 °C)] 98.9 °F (37.2 °C)  Pulse:  [] 101  Resp:  [14-30] 22  SpO2:  [95 %-100 %] 99 %  BP: (113-156)/(52-85) 133/70     Weight: 102.1 kg (225 lb 1.4 oz)  Body mass index is 31.39 kg/m².      Intake/Output Summary (Last 24 hours) at 1/26/2022 1137  Last data filed at 1/25/2022 2105  Gross per 24 hour   Intake 150 ml   Output --   Net 150 ml       Physical Exam  Vitals reviewed.   Constitutional:       General: He is not in acute distress.     Appearance: He is ill-appearing.   HENT:      Head: Normocephalic and atraumatic.      Right Ear: External ear normal.      Left Ear: External ear normal.      Nose: Nose normal.      Mouth/Throat:      Mouth: Mucous membranes are moist.      Comments: trach  Eyes:      Conjunctiva/sclera: Conjunctivae normal.      Pupils: Pupils are equal, round, and reactive to light.   Cardiovascular:      Rate and Rhythm: Regular rhythm. Tachycardia present.      Pulses: Normal pulses.      Heart sounds: Normal heart sounds.   Pulmonary:      Effort: No respiratory distress.      Comments: clear breath sounds anteriorly  Abdominal:      Palpations: Abdomen is soft.   Musculoskeletal:         General: Normal range of motion.      Cervical back: Neck supple.      Right lower leg: Edema present.      Left lower leg: Edema present.   Lymphadenopathy:      Cervical: No cervical adenopathy.   Skin:     General: Skin is warm and dry.   Neurological:      Cranial Nerves: No cranial nerve deficit.      Comments: Remains on mechanical ventilation. Will track at times         Vents:  Vent Mode: A/C  (01/26/22 0516)  Set Rate: 16 BPM (01/26/22 0516)  Vt Set: 480 mL (01/26/22 0516)  Pressure Support: 15 cmH20 (01/25/22 2016)  PEEP/CPAP: 5 cmH20 (01/26/22 0516)  Oxygen Concentration (%): 35 (01/26/22 0800)  Peak Airway Pressure: 21 cmH2O (01/26/22 0516)  Total Ve: 6.9 mL (01/26/22 0516)  F/VT Ratio<105 (RSBI): (!) 53.62 (01/25/22 0800)    Lines/Drains/Airways     Peripherally Inserted Central Catheter Line            PICC Double Lumen 01/05/22 left basilic 21 days          Central Venous Catheter Line                 Hemodialysis Catheter 12/30/21 0900 right internal jugular 27 days          Drain                 NG/OG Tube [place] sump 18 Fr. Left nostril -- days         Colostomy 12/18/21 1030 Descending/sigmoid LUQ 39 days          Airway                 Surgical Airway 01/04/22 Shiley 22 days                Significant Labs:    CBC/Anemia Profile:  No results for input(s): WBC, HGB, HCT, PLT, MCV, RDW, IRON, FERRITIN, RETIC, FOLATE, [id number], OCCULTBLOOD in the last 48 hours.     Chemistries:  No results for input(s): NA, K, CL, CO2, BUN, CREATININE, CALCIUM, ALBUMIN, PROT, BILITOT, ALKPHOS, ALT, AST, GLUCOSE, MG, PHOS in the last 48 hours.    All pertinent labs within the past 24 hours have been reviewed.    Significant Imaging:  I have reviewed all pertinent imaging results/findings within the past 24 hours.

## 2022-01-27 PROBLEM — J95.851 VENTILATOR ASSOCIATED PNEUMONIA: Status: RESOLVED | Noted: 2022-01-01 | Resolved: 2022-01-01

## 2022-01-27 NOTE — ASSESSMENT & PLAN NOTE
Dialysis Monday   Continue with scheduled hemodialysis for this patient.  1/5/2022 Dialysis done today.  No other changes  1/6/2022 Continue with dialysis on tomorrow.  1/7/2022  Dialysis as scheduled today.  And dialysis on tomorrow.  1/10/2022 Dialysis as scheduled.  1/11/2022 No signs of renal recovery.  Continue with dialysis management  1/12/2022 Continue with scheduled hemodialysis for this patient.  1/13/2022 Dialysis scheduled for tomorrow.  1/14/2022 Dialysis scheduled for Saturday 1/16/2022 Tolerated dialysis on Saturday.  Condition remains critical  1/17/2022  Continue with scheduled hemodialysis for this patient.  The situation remains critical.  1/18/2022 The patient's condition is the same.  1/19/2022 Dialysis as scheduled.  Volume status is stable.  1/20/2022 Continue current management  1/21/2022 Continue with dialysis.  1/24/2022 Dialysis sessions are going well.  1/25/2022 Continue with support  1/27/2022 Dialysis as scheduled for this patient.

## 2022-01-27 NOTE — ASSESSMENT & PLAN NOTE
- 12/14 intubated, tracheostomy 1/4  Weaning waxes and wanes     1/27- continue to increase as tolerated. Currently doing 4 hours SIMV with reduced rate of 4. - will transition his fentanyl infusion to a patch of 50mcg today

## 2022-01-27 NOTE — SUBJECTIVE & OBJECTIVE
Interval History: The patient's condition is about the same.  He remains ventilated.    Review of patient's allergies indicates:  No Known Allergies  Current Facility-Administered Medications   Medication Frequency    0.9%  NaCl infusion (for blood administration) Q24H PRN    0.9%  NaCl infusion PRN    0.9%  NaCl infusion PRN    acetaminophen tablet 1,000 mg Q6H PRN    acetaminophen tablet 500 mg Q6H PRN    albuterol nebulizer solution 2.5 mg Q4H PRN    amphotericin B liposome (AMBISOME) 450 mg in dextrose 5 % 450 mL IVPB Q24H    bisacodyL EC tablet 10 mg Daily PRN    dextromethorphan-guaiFENesin  mg/5 ml liquid 10 mL Q6H PRN    dextrose 50% injection 12.5 g PRN    diltiaZEM tablet 30 mg Q6H    fentaNYL 50 mcg/hr 1 patch Q72H    gentamicin - pharmacy to dose pharmacy to manage frequency    glucagon (human recombinant) injection 1 mg PRN    heparin (porcine) injection 4,000 Units PRN    heparin (porcine) injection 5,000 Units Q8H    insulin aspart U-100 injection 1-10 Units Q6H    metoprolol injection 5 mg Q5 Min PRN    NORepinephrine 32 mg in dextrose 5 % 250 mL infusion PRN    ondansetron injection 8 mg Q6H PRN    pantoprazole suspension 40 mg Daily    piperacillin-tazobactam (ZOSYN) 4.5 g in dextrose 5 % in water (D5W) 5 % 100 mL IVPB (MB+) Q12H    sevelamer carbonate pwpk 0.8 g TID WM    simethicone chewable tablet 80 mg TID PRN    ticagrelor tablet 90 mg BID    traZODone tablet 50 mg Nightly PRN       Objective:     Vital Signs (Most Recent):  Temp: 98.7 °F (37.1 °C) (01/27/22 0800)  Pulse: 102 (01/27/22 1515)  Resp: 10 (01/27/22 1515)  BP: 134/67 (01/27/22 1515)  SpO2: 98 % (01/27/22 1515)  O2 Device (Oxygen Therapy): ventilator (01/27/22 0835) Vital Signs (24h Range):  Temp:  [97.4 °F (36.3 °C)-99.4 °F (37.4 °C)] 98.7 °F (37.1 °C)  Pulse:  [] 102  Resp:  [8-40] 10  SpO2:  [93 %-100 %] 98 %  BP: (111-168)/(59-88) 134/67     Weight: 102.2 kg (225 lb 5 oz) (01/27/22  0600)  Body mass index is 31.42 kg/m².  Body surface area is 2.26 meters squared.    I/O last 3 completed shifts:  In: 150 [NG/GT:150]  Out: 250 [Stool:250]    Physical Exam  Vitals reviewed.   Cardiovascular:      Rate and Rhythm: Regular rhythm.      Heart sounds: Normal heart sounds.   Pulmonary:      Comments: The patient is ventilated  Abdominal:      General: Bowel sounds are normal.      Palpations: Abdomen is soft.      Comments: NG tube in place   Neurological:      Mental Status: He is alert.         Significant Labs:  BMP:   Recent Labs   Lab 01/27/22  0624   *      K 4.7      CO2 27   BUN 28*   CREATININE 2.05*   CALCIUM 7.5*     CBC:   Recent Labs   Lab 01/27/22  0624   WBC 15.59*   RBC 2.45*   HGB 7.2*   HCT 21.1*      MCV 86.1   MCH 29.4   MCHC 34.1        Significant Imaging:  Labs: Reviewed

## 2022-01-27 NOTE — PLAN OF CARE
Problem: Adult Inpatient Plan of Care  Goal: Plan of Care Review  Outcome: Ongoing, Progressing  Goal: Patient-Specific Goal (Individualized)  Outcome: Ongoing, Progressing     Problem: Communication Impairment (Mechanical Ventilation, Invasive)  Goal: Effective Communication  Outcome: Ongoing, Progressing     Problem: Device-Related Complication Risk (Mechanical Ventilation, Invasive)  Goal: Optimal Device Function  Outcome: Ongoing, Progressing     Problem: Inability to Wean (Mechanical Ventilation, Invasive)  Goal: Mechanical Ventilation Liberation  Outcome: Ongoing, Progressing     Problem: Nutrition Impairment (Mechanical Ventilation, Invasive)  Goal: Optimal Nutrition Delivery  Outcome: Ongoing, Progressing     Problem: Skin and Tissue Injury (Mechanical Ventilation, Invasive)  Goal: Absence of Device-Related Skin and Tissue Injury  Outcome: Ongoing, Progressing     Problem: Ventilator-Induced Lung Injury (Mechanical Ventilation, Invasive)  Goal: Absence of Ventilator-Induced Lung Injury  Outcome: Ongoing, Progressing     Problem: Communication Impairment (Artificial Airway)  Goal: Effective Communication  Outcome: Ongoing, Progressing     Problem: Device-Related Complication Risk (Artificial Airway)  Goal: Optimal Device Function  Outcome: Ongoing, Progressing     Problem: Skin and Tissue Injury (Artificial Airway)  Goal: Absence of Device-Related Skin or Tissue Injury  Outcome: Ongoing, Progressing     Problem: Noninvasive Ventilation Acute  Goal: Effective Unassisted Ventilation and Oxygenation  Outcome: Ongoing, Progressing     Problem: Skin Injury Risk Increased  Goal: Skin Health and Integrity  Outcome: Ongoing, Progressing     Problem: Adult Inpatient Plan of Care  Goal: Patient-Specific Goal (Individualized)  Outcome: Ongoing, Progressing     Problem: Communication Impairment (Mechanical Ventilation, Invasive)  Goal: Effective Communication  Outcome: Ongoing, Progressing     Problem: Device-Related  Complication Risk (Mechanical Ventilation, Invasive)  Goal: Optimal Device Function  Outcome: Ongoing, Progressing     Problem: Inability to Wean (Mechanical Ventilation, Invasive)  Goal: Mechanical Ventilation Liberation  Outcome: Ongoing, Progressing     Problem: Nutrition Impairment (Mechanical Ventilation, Invasive)  Goal: Optimal Nutrition Delivery  Outcome: Ongoing, Progressing     Problem: Skin and Tissue Injury (Mechanical Ventilation, Invasive)  Goal: Absence of Device-Related Skin and Tissue Injury  Outcome: Ongoing, Progressing     Problem: Ventilator-Induced Lung Injury (Mechanical Ventilation, Invasive)  Goal: Absence of Ventilator-Induced Lung Injury  Outcome: Ongoing, Progressing     Problem: Communication Impairment (Artificial Airway)  Goal: Effective Communication  Outcome: Ongoing, Progressing     Problem: Device-Related Complication Risk (Artificial Airway)  Goal: Optimal Device Function  Outcome: Ongoing, Progressing     Problem: Skin and Tissue Injury (Artificial Airway)  Goal: Absence of Device-Related Skin or Tissue Injury  Outcome: Ongoing, Progressing     Problem: Noninvasive Ventilation Acute  Goal: Effective Unassisted Ventilation and Oxygenation  Outcome: Ongoing, Progressing     Problem: Skin Injury Risk Increased  Goal: Skin Health and Integrity  Outcome: Ongoing, Progressing

## 2022-01-27 NOTE — PROGRESS NOTES
Wound care note;  Patient skin was evaluated today.  Patient in bed, on ventilator, eyes open.  Abdominal wound with pink, moist wound bed, undermining noted.  Rectal wound with pink slow granular tissue noted, area of scattered tan slough, improving.  Sacral has tan slough noted upper aspect  Applied Dakins moist gauze to rectal/sacral wound   Reapplied new NPWT to abdomen, suctioning at 125 mm Hg.  Changed LLQ colostomy appliance during NPWT , pink. Moist budded stoma noted. Brown soft stool noted in pouch.  Has aquacel foam border to Right medial great toe.  Right hand with black scab noted , applied Transparent dressing to area to soften tissue.  Cont turn protocol  Waffle boots  On low air loss mattress

## 2022-01-27 NOTE — PROGRESS NOTES
[facility]  Nephrology  Progress Note    Patient Name: Og Garcia  MRN: [id number]  Admission Date: 12/23/2021  Hospital Length of Stay: 35 days  Attending Provider: Cecil Abernathy DO   Primary Care Physician: Hector Arndt DNP, FNP-C  Principal Problem:Denice gangrene    Subjective:     HPI: The patient is known from the previous nephrology consult at [facility].  He is no at Specialty and continues to need dialysis support.      Interval History: The patient's condition is about the same.  He remains ventilated.    Review of patient's allergies indicates:  No Known Allergies  Current Facility-Administered Medications   Medication Frequency    0.9%  NaCl infusion (for blood administration) Q24H PRN    0.9%  NaCl infusion PRN    0.9%  NaCl infusion PRN    acetaminophen tablet 1,000 mg Q6H PRN    acetaminophen tablet 500 mg Q6H PRN    albuterol nebulizer solution 2.5 mg Q4H PRN    amphotericin B liposome (AMBISOME) 450 mg in dextrose 5 % 450 mL IVPB Q24H    bisacodyL EC tablet 10 mg Daily PRN    dextromethorphan-guaiFENesin  mg/5 ml liquid 10 mL Q6H PRN    dextrose 50% injection 12.5 g PRN    diltiaZEM tablet 30 mg Q6H    fentaNYL 50 mcg/hr 1 patch Q72H    gentamicin - pharmacy to dose pharmacy to manage frequency    glucagon (human recombinant) injection 1 mg PRN    heparin (porcine) injection 4,000 Units PRN    heparin (porcine) injection 5,000 Units Q8H    insulin aspart U-100 injection 1-10 Units Q6H    metoprolol injection 5 mg Q5 Min PRN    NORepinephrine 32 mg in dextrose 5 % 250 mL infusion PRN    ondansetron injection 8 mg Q6H PRN    pantoprazole suspension 40 mg Daily    piperacillin-tazobactam (ZOSYN) 4.5 g in dextrose 5 % in water (D5W) 5 % 100 mL IVPB (MB+) Q12H    sevelamer carbonate pwpk 0.8 g TID WM    simethicone chewable tablet 80 mg TID PRN    ticagrelor tablet 90 mg BID    traZODone tablet 50 mg Nightly PRN       Objective:     Vital  Signs (Most Recent):  Temp: 98.7 °F (37.1 °C) (01/27/22 0800)  Pulse: 102 (01/27/22 1515)  Resp: 10 (01/27/22 1515)  BP: 134/67 (01/27/22 1515)  SpO2: 98 % (01/27/22 1515)  O2 Device (Oxygen Therapy): ventilator (01/27/22 0835) Vital Signs (24h Range):  Temp:  [97.4 °F (36.3 °C)-99.4 °F (37.4 °C)] 98.7 °F (37.1 °C)  Pulse:  [] 102  Resp:  [8-40] 10  SpO2:  [93 %-100 %] 98 %  BP: (111-168)/(59-88) 134/67     Weight: 102.2 kg (225 lb 5 oz) (01/27/22 0600)  Body mass index is 31.42 kg/m².  Body surface area is 2.26 meters squared.    I/O last 3 completed shifts:  In: 150 [NG/GT:150]  Out: 250 [Stool:250]    Physical Exam  Vitals reviewed.   Cardiovascular:      Rate and Rhythm: Regular rhythm.      Heart sounds: Normal heart sounds.   Pulmonary:      Comments: The patient is ventilated  Abdominal:      General: Bowel sounds are normal.      Palpations: Abdomen is soft.      Comments: NG tube in place   Neurological:      Mental Status: He is alert.         Significant Labs:  BMP:   Recent Labs   Lab 01/27/22  0624   *      K 4.7      CO2 27   BUN 28*   CREATININE 2.05*   CALCIUM 7.5*     CBC:   Recent Labs   Lab 01/27/22  0624   WBC 15.59*   RBC 2.45*   HGB 7.2*   HCT 21.1*      MCV 86.1   MCH 29.4   MCHC 34.1        Significant Imaging:  Labs: Reviewed    Assessment/Plan:     RAHAT (acute kidney injury)  Dialysis Monday   Continue with scheduled hemodialysis for this patient.  1/5/2022 Dialysis done today.  No other changes  1/6/2022 Continue with dialysis on tomorrow.  1/7/2022  Dialysis as scheduled today.  And dialysis on tomorrow.  1/10/2022 Dialysis as scheduled.  1/11/2022 No signs of renal recovery.  Continue with dialysis management  1/12/2022 Continue with scheduled hemodialysis for this patient.  1/13/2022 Dialysis scheduled for tomorrow.  1/14/2022 Dialysis scheduled for Saturday 1/16/2022 Tolerated dialysis on Saturday.  Condition remains critical  1/17/2022  Continue with  scheduled hemodialysis for this patient.  The situation remains critical.  1/18/2022 The patient's condition is the same.  1/19/2022 Dialysis as scheduled.  Volume status is stable.  1/20/2022 Continue current management  1/21/2022 Continue with dialysis.  1/24/2022 Dialysis sessions are going well.  1/25/2022 Continue with support  1/27/2022 Dialysis as scheduled for this patient.        Thank you for your consult. I will follow-up with patient. Please contact us if you have any additional questions.    Edwin Pryor Jr, MD  Nephrology  [facility]

## 2022-01-27 NOTE — SUBJECTIVE & OBJECTIVE
Interval History: Opens eyes, does not follows.     Objective:     Vital Signs (Most Recent):  Temp: 98.7 °F (37.1 °C) (01/27/22 0800)  Pulse: 102 (01/27/22 1515)  Resp: 10 (01/27/22 1515)  BP: 134/67 (01/27/22 1515)  SpO2: 98 % (01/27/22 1515) Vital Signs (24h Range):  Temp:  [97.4 °F (36.3 °C)-99.4 °F (37.4 °C)] 98.7 °F (37.1 °C)  Pulse:  [] 102  Resp:  [8-40] 10  SpO2:  [93 %-100 %] 98 %  BP: (111-168)/(59-88) 134/67     Weight: 102.2 kg (225 lb 5 oz)  Body mass index is 31.42 kg/m².      Intake/Output Summary (Last 24 hours) at 1/27/2022 1718  Last data filed at 1/27/2022 1200  Gross per 24 hour   Intake --   Output 300 ml   Net -300 ml       Physical Exam  Vitals reviewed.   Constitutional:       General: He is not in acute distress.     Appearance: He is ill-appearing.   HENT:      Head: Normocephalic and atraumatic.      Right Ear: External ear normal.      Left Ear: External ear normal.      Nose: Nose normal.      Mouth/Throat:      Mouth: Mucous membranes are moist.      Comments: trach  Eyes:      Conjunctiva/sclera: Conjunctivae normal.      Pupils: Pupils are equal, round, and reactive to light.   Cardiovascular:      Rate and Rhythm: Regular rhythm. Tachycardia present.      Pulses: Normal pulses.      Heart sounds: Normal heart sounds.   Pulmonary:      Effort: No respiratory distress.      Comments: clear breath sounds anteriorly  Abdominal:      Palpations: Abdomen is soft.   Musculoskeletal:         General: Normal range of motion.      Cervical back: Neck supple.      Right lower leg: Edema present.      Left lower leg: Edema present.   Lymphadenopathy:      Cervical: No cervical adenopathy.   Skin:     General: Skin is warm and dry.   Neurological:      Cranial Nerves: No cranial nerve deficit.      Comments: Remains on mechanical ventilation. Will track at times         Vents:  Vent Mode: CPAP/PSV (01/27/22 0835)  Set Rate: 0 BPM (01/27/22 0835)  Vt Set: 480 mL (01/27/22 0241)  Pressure  Support: 15 cmH20 (01/27/22 0835)  PEEP/CPAP: 5 cmH20 (01/27/22 0835)  Oxygen Concentration (%): 35 (01/27/22 1600)  Peak Airway Pressure: 21 cmH2O (01/27/22 0835)  Total Ve: 11 mL (01/27/22 0835)  F/VT Ratio<105 (RSBI): (!) 49.13 (01/1955)    Lines/Drains/Airways     Peripherally Inserted Central Catheter Line            PICC Double Lumen 01/05/22 left basilic 22 days          Central Venous Catheter Line                 Hemodialysis Catheter 12/30/21 0900 right internal jugular 28 days          Drain                 NG/OG Tube Bledsoe sump 18 Fr. Left nostril -- days         Colostomy 12/18/21 1030 Descending/sigmoid LUQ 40 days          Airway                 Surgical Airway 01/04/22 Shiley 23 days                Significant Labs:    CBC/Anemia Profile:  Recent Labs   Lab 01/27/22  0624   WBC 15.59*   HGB 7.2*   HCT 21.1*      MCV 86.1   RDW 13.8        Chemistries:  Recent Labs   Lab 01/27/22  0624      K 4.7      CO2 27   BUN 28*   CREATININE 2.05*   CALCIUM 7.5*       All pertinent labs within the past 24 hours have been reviewed.    Significant Imaging:  I have reviewed all pertinent imaging results/findings within the past 24 hours.

## 2022-01-28 NOTE — PROGRESS NOTES
[facility]  Nephrology  Progress Note    Patient Name: Og Garcia  MRN: [id number]  Admission Date: 12/23/2021  Hospital Length of Stay: 36 days  Attending Provider: Cecil Abernathy DO   Primary Care Physician: Hector Arndt DNP, FNP-C  Principal Problem:Denice gangrene    Subjective:     HPI: The patient is known from the previous nephrology consult at [facility].  He is no at Specialty and continues to need dialysis support.      Interval History: No acute changes for the patient.   No fevers.      Review of patient's allergies indicates:  No Known Allergies  Current Facility-Administered Medications   Medication Frequency    0.9%  NaCl infusion (for blood administration) Q24H PRN    0.9%  NaCl infusion PRN    0.9%  NaCl infusion PRN    acetaminophen tablet 1,000 mg Q6H PRN    acetaminophen tablet 500 mg Q6H PRN    albuterol nebulizer solution 2.5 mg Q4H PRN    amphotericin B liposome (AMBISOME) 450 mg in dextrose 5 % 450 mL IVPB Q24H    bisacodyL EC tablet 10 mg Daily PRN    dextromethorphan-guaiFENesin  mg/5 ml liquid 10 mL Q6H PRN    dextrose 50% injection 12.5 g PRN    diltiaZEM tablet 30 mg Q6H    fentaNYL 50 mcg/hr 1 patch Q72H    gentamicin - pharmacy to dose pharmacy to manage frequency    glucagon (human recombinant) injection 1 mg PRN    heparin (porcine) injection 4,000 Units PRN    heparin (porcine) injection 5,000 Units Q8H    insulin aspart U-100 injection 1-10 Units Q6H    metoprolol injection 5 mg Q5 Min PRN    NORepinephrine 32 mg in dextrose 5 % 250 mL infusion PRN    ondansetron injection 8 mg Q6H PRN    pantoprazole suspension 40 mg Daily    piperacillin-tazobactam (ZOSYN) 4.5 g in dextrose 5 % in water (D5W) 5 % 100 mL IVPB (MB+) Q12H    sevelamer carbonate pwpk 0.8 g TID WM    simethicone chewable tablet 80 mg TID PRN    ticagrelor tablet 90 mg BID    traZODone tablet 50 mg Nightly PRN       Objective:     Vital Signs (Most  Recent):  Temp: 99.9 °F (37.7 °C) (01/27/22 2300)  Pulse: 93 (01/28/22 0000)  Resp: 19 (01/28/22 0000)  BP: (!) 140/73 (01/28/22 0000)  SpO2: 97 % (01/28/22 0000)  O2 Device (Oxygen Therapy): ventilator (01/27/22 1545) Vital Signs (24h Range):  Temp:  [98.7 °F (37.1 °C)-99.9 °F (37.7 °C)] 99.9 °F (37.7 °C)  Pulse:  [] 93  Resp:  [8-41] 19  SpO2:  [93 %-100 %] 97 %  BP: (111-168)/(53-90) 140/73     Weight: 102.2 kg (225 lb 5 oz) (01/27/22 0600)  Body mass index is 31.42 kg/m².  Body surface area is 2.26 meters squared.    I/O last 3 completed shifts:  In: -   Out: 550 [Stool:550]    Physical Exam  Vitals reviewed.   Constitutional:       General: He is not in acute distress.  HENT:      Mouth/Throat:      Mouth: Mucous membranes are moist.   Cardiovascular:      Rate and Rhythm: Regular rhythm.      Pulses: Normal pulses.   Pulmonary:      Effort: Pulmonary effort is normal.      Comments: Ventilated, resting comfortably.  Abdominal:      General: Bowel sounds are normal.      Palpations: Abdomen is soft.         Significant Labs:  BMP:   Recent Labs   Lab 01/27/22  0624   *      K 4.7      CO2 27   BUN 28*   CREATININE 2.05*   CALCIUM 7.5*     CBC:   Recent Labs   Lab 01/27/22  0624   WBC 15.59*   RBC 2.45*   HGB 7.2*   HCT 21.1*      MCV 86.1   MCH 29.4   MCHC 34.1        Significant Imaging:  Labs: Reviewed    Assessment/Plan:     RAHAT (acute kidney injury)  Dialysis Monday   Continue with scheduled hemodialysis for this patient.  1/5/2022 Dialysis done today.  No other changes  1/6/2022 Continue with dialysis on tomorrow.  1/7/2022  Dialysis as scheduled today.  And dialysis on tomorrow.  1/10/2022 Dialysis as scheduled.  1/11/2022 No signs of renal recovery.  Continue with dialysis management  1/12/2022 Continue with scheduled hemodialysis for this patient.  1/13/2022 Dialysis scheduled for tomorrow.  1/14/2022 Dialysis scheduled for Saturday 1/16/2022 Tolerated dialysis on  Saturday.  Condition remains critical  1/17/2022  Continue with scheduled hemodialysis for this patient.  The situation remains critical.  1/18/2022 The patient's condition is the same.  1/19/2022 Dialysis as scheduled.  Volume status is stable.  1/20/2022 Continue current management  1/21/2022 Continue with dialysis.  1/24/2022 Dialysis sessions are going well.  1/25/2022 Continue with support  1/27/2022 Dialysis as scheduled for this patient.  1/28/2022 Dialysis for today.  Condition is the same.        Thank you for your consult. I will follow-up with patient. Please contact us if you have any additional questions.    Edwin Pryor Jr, MD  Nephrology  [facility]

## 2022-01-28 NOTE — PLAN OF CARE
Per IDT meeting continue plan of care. Pt still requiring IV meds, vent , NGTF and dialysis . SS will follow for discharge needs.

## 2022-01-28 NOTE — PROGRESS NOTES
[facility]  Adult Nutrition  Follow-up Note         Reason for Assessment  Reason For Assessment: RD follow-up  Nutrition Risk Screen: tube feeding or parenteral nutrition  Malnutrition  Is Patient Malnourished: No  Nutrition Diagnosis  Inadequate oral intake   related to Decreased ability to consume sufficient energy as evidenced by pt on vent    Nutrition Diagnosis Status: Chronic/ continues      Nutrition Risk  Level of Risk/Frequency of Follow-up: high   Chewing or Swallowing Difficulty?: Swallowing difficulty  Estimated/Assessed Needs  RMR ([place]-St. Jeor Equation): 1808.13 Activity Factor: 1 Injury Factor: 1   Total Ve: 5.3 mL Temp: 99.1 °F (37.3 °C)Axillary  Weight Used For Calorie Calculations: 101.5 kg (223 lb 12.3 oz)   Energy Need Method: [facility] (modified) Energy Calorie Requirements (kcal): 2068  Weight Used For Protein Calculations: 81 kg (178 lb 9.2 oz) (adjusted body wt)  Protein Requirements:   Estimated Fluid Requirement Method: RDA Method Fluid Requirements (mL): 2068  RDA Method (mL): 2068     Nutrition Prescription / Recommendations  Recommendation/Intervention: Vital AF @ 75ml/hr; H20 flush of 50ml q4hr  Goals: pt will tolerate tube feeding; pt will meet estimated nutritional needs  Nutrition Goal Status: goal met  Communication of RD Recs: reviewed with physician  Current Diet Order: Vital AF @ 75ml/hr; H20 flush of 50ml q4hr  Nutrition Order Comments: TF: Vital AF @ 75ml/hr; H20 flush of 60ml q2hr  Current Nutrition Support Formula Ordered: Vital AF 1.2  Current Nutrition Support Rate Ordered: 75 (ml)  Current Nutrition Support Frequency Ordered: hourly  Recommended Diet: Enteral Nutrition Vital AF @ 75ml/hr; H20 flush of 50ml q4hr  Recommended Oral Supplement: No Oral Supplements  Is Nutrition Support Recommended: No  Is Education Recommended: No  Monitor and Evaluation  % current Intake: Enteral Nutrition at goal  % intake to meet estimated needs: Enteral  Nutrition   Food and Nutrient Intake: enteral nutrition intake  Food and Nutrient Adminstration: enteral and parenteral nutrition administration  Anthropometric Measurements: height/length,weight,body mass index,weight change  Biochemical Data, Medical Tests and Procedures: electrolyte and renal panel,glucose/endocrine profile  Nutrition-Focused Physical Findings: skin  Enteral Calories (kcal): 2160  Enteral Protein (gm): 135  Enteral (Free Water) Fluid (mL): 1458  Free Water Flush Fluid (mL): 300  Parenteral Calories (kcal): 845  Parenteral Protein (gm): 48  Parenteral Fluid (mL): 960  Lipid Calories (kcals): 500 kcals  Other Calories (kcal): 528 (propofol)  Total Calories (kcal): 2160  Total Calories (kcal/kg): 2612  % Kcal Needs: 100  Total Protein (gm): 135  % Protein Needs: 100  IV Fluid (mL): 1764  Total Fluid Intake (mL): 1758  Energy Calories Required: meeting needs  Protein Required: meeting needs  Fluid Required: meeting needs  Tolerance: tolerating  Current Medical Diagnosis and Past Medical History  Diagnosis: gastrointestinal disease,infection/sepsis  Past Medical History:   Diagnosis Date    Hyperlipidemia     Hypertension      Nutrition/Diet History  Spiritual, Cultural Beliefs, Orthodox Practices, Values that Affect Care: no  Food Allergies: NKFA  Factors Affecting Nutritional Intake: None identified at this time  Lab/Procedures/Meds  Recent Labs   Lab 01/28/22  0827   *   K 3.9   BUN 44*   CREATININE 2.85*   *   CALCIUM 8.1*   CL 97*     Last A1c: No results found for: HGBA1C  Lab Results   Component Value Date    RBC 2.14 (L) 01/28/2022    HGB 6.4 (L) 01/28/2022    HCT 18.4 (L) 01/28/2022    MCV 86.0 01/28/2022    MCH 29.9 01/28/2022    MCHC 34.8 01/28/2022     Pertinent Labs Reviewed: reviewed  Pertinent Labs Comments: Na 133. BUN 44, creat 2.85, glu 219, Alb .8  Pertinent Medications Reviewed: reviewed  Pertinent Medications Comments: heparin, insulin  Anthropometrics  Temp: 99.1  "°F (37.3 °C)  Height Method: Stated  Height: 5' 11" (180.3 cm)  Height (inches): 71 in  Weight Method: Bed Scale  Weight: 100.6 kg (221 lb 12.5 oz)  Weight (lb): 221.78 lb  Ideal Body Weight (IBW), Male: 172 lb  % Ideal Body Weight, Male (lb): 115.36 %  BMI (Calculated): 30.9  BMI Grade: 25 - 29.9 - overweight     Nutrition by Nursing  Diet/Nutrition Received: tube feeding     Diet/Feeding Assistance: none  Diet/Feeding Tolerance: other (see comments)  Last Bowel Movement: 01/28/22       NG/OG Tube [place] sump 18 Fr. Left nostril-Feeding Type: continuous,by pump       NG/OG Tube [place] sump 18 Fr. Left nostril-Current Rate (mL/hr): 75 mL/hr       NG/OG Tube [place] sump 18 Fr. Left nostril-Goal Rate (mL/hr): 75 mL/hr       NG/OG Tube [place] sump 18 Fr. Left nostril-Formula Name: vital af  Nutrition Follow-Up  RD Follow-up?: Yes  Assessment and Plan  No new Assessment & Plan notes have been filed under this hospital service since the last note was generated.  Service: Nutrition     "

## 2022-01-28 NOTE — PROGRESS NOTES
[facility]  Pulmonology  Progress Note    Patient Name: Og Garcia  MRN: [id number]  Admission Date: 12/23/2021  Hospital Length of Stay: 36 days  Code Status: Prior  Attending Provider: Cecil Abernathy DO  Primary Care Provider: Hector Arndt DNP, FNP-C   Principal Problem: Denice gangrene    Subjective:     Interval History: opens eyes and looks around room, not following commands, getting HD     Objective:     Vital Signs (Most Recent):  Temp: 98.8 °F (37.1 °C) (01/28/22 0400)  Pulse: 99 (01/28/22 0600)  Resp: 17 (01/28/22 0600)  BP: 127/65 (01/28/22 0600)  SpO2: 100 % (01/28/22 0800) Vital Signs (24h Range):  Temp:  [98.8 °F (37.1 °C)-99.9 °F (37.7 °C)] 98.8 °F (37.1 °C)  Pulse:  [] 99  Resp:  [10-41] 17  SpO2:  [95 %-100 %] 100 %  BP: (119-168)/(53-90) 127/65     Weight: 100.6 kg (221 lb 12.5 oz)  Body mass index is 30.93 kg/m².      Intake/Output Summary (Last 24 hours) at 1/28/2022 0942  Last data filed at 1/28/2022 0700  Gross per 24 hour   Intake 1170 ml   Output 300 ml   Net 870 ml       Physical Exam  Vitals reviewed.   Constitutional:       General: He is not in acute distress.     Appearance: He is ill-appearing.   HENT:      Head: Normocephalic and atraumatic.      Right Ear: External ear normal.      Left Ear: External ear normal.      Nose: Nose normal.      Mouth/Throat:      Mouth: Mucous membranes are moist.      Comments: trach  Eyes:      Conjunctiva/sclera: Conjunctivae normal.      Pupils: Pupils are equal, round, and reactive to light.   Cardiovascular:      Rate and Rhythm: Regular rhythm. Tachycardia present.      Pulses: Normal pulses.      Heart sounds: Normal heart sounds.   Pulmonary:      Effort: Pulmonary effort is normal. No respiratory distress.      Comments: Ventilated, resting comfortably.  Abdominal:      General: Bowel sounds are normal.      Palpations: Abdomen is soft.   Musculoskeletal:         General: Normal range of motion.       Cervical back: Neck supple.      Right lower leg: Edema present.      Left lower leg: Edema present.   Lymphadenopathy:      Cervical: No cervical adenopathy.   Skin:     General: Skin is warm and dry.   Neurological:      Cranial Nerves: No cranial nerve deficit.      Comments: Remains on mechanical ventilation. Will track at times         Vents:  Vent Mode: SIMV (VC) + PS (01/28/22 0830)  Set Rate: 4 BPM (01/28/22 0830)  Vt Set: 480 mL (01/28/22 0830)  Pressure Support: 15 cmH20 (01/28/22 0830)  PEEP/CPAP: 5 cmH20 (01/28/22 0830)  Oxygen Concentration (%): 35 (01/28/22 0800)  Peak Airway Pressure: 21 cmH2O (01/28/22 0830)  Total Ve: 5.3 mL (01/28/22 0830)  F/VT Ratio<105 (RSBI): (!) 97.06 (01/28/22 0300)    Lines/Drains/Airways     Peripherally Inserted Central Catheter Line            PICC Double Lumen 01/05/22 left basilic 23 days          Central Venous Catheter Line                 Hemodialysis Catheter 12/30/21 0900 right internal jugular 29 days          Drain                 NG/OG Tube [place] sump 18 Fr. Left nostril -- days         Colostomy 12/18/21 1030 Descending/sigmoid LUQ 40 days          Airway                 Surgical Airway 01/04/22 [place] 24 days                Significant Labs:    CBC/Anemia Profile:  Recent Labs   Lab 01/27/22  0624 01/28/22  0827   WBC 15.59* 17.08*   HGB 7.2* 6.4*   HCT 21.1* 18.4*    209   MCV 86.1 86.0   RDW 13.8 14.0        Chemistries:  Recent Labs   Lab 01/27/22  0624 01/28/22  0827    133*   K 4.7 3.9    97*   CO2 27 24   BUN 28* 44*   CREATININE 2.05* 2.85*   CALCIUM 7.5* 8.1*       All pertinent labs within the past 24 hours have been reviewed.    Significant Imaging:  I have reviewed all pertinent imaging results/findings within the past 24 hours.    Assessment/Plan:     * Denice gangrene  - s/p multiple debridements  - wound vac in place  - ID consulted for antibiotic management: He was treated with rocephin, daptomycin, clindamycin. 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 Amphotericin started on 1/7. The patient went to OR on 1/10 for debridement. Surgery note reviewed and appreciated.  1/12- back to OR today.  1/13- OR note reviewed. No purulence noted.    1/27- wound vac in place     Disseminated mucormycosis  Defer to Dr. Hall on all antibiotic choices---Her note has been reviewed and is appreciated.     Amphotericin until 02/03   zosyn and gent for pseudomonal double coverage until 02/01      Type 2 diabetes mellitus without complication, without long-term current use of insulin  - continue basal/bolus insulin  -accuchecks are reasonable currently  Fasting 212 this morning -- he has had some issues with hypoglycemia and levemir was held..    RAHAT (acute kidney injury)  - tunneled HD line placement 12/30    HD MWF            On mechanically assisted ventilation  - 12/14 intubated, tracheostomy 1/4  Weaning waxes and wanes     1/27- continue to increase as tolerated. Currently doing 4 hours SIMV with reduced rate of 4. - will transition his fentanyl infusion to a patch of 50mcg today    1/28- increase SIMV trails to 5hrs TID as tolerated - seems more awake today with decrease in fentanyl                  FLORESITA Shafer-ACNP  Pulmonology  [facility]

## 2022-01-28 NOTE — ASSESSMENT & PLAN NOTE
- continue basal/bolus insulin  -accuchecks are reasonable currently  Fasting 212 this morning -- he has had some issues with hypoglycemia and levemir was held..

## 2022-01-28 NOTE — PROGRESS NOTES
[facility]  Pulmonology  Progress Note    Patient Name: Og Garcia  MRN: [id number]  Admission Date: 12/23/2021  Hospital Length of Stay: 35 days  Code Status: Prior  Attending Provider: Cecil Abernathy DO  Primary Care Provider: Hector Arndt DNP, FNP-C   Principal Problem: Denice gangrene    Subjective:     Interval History: Opens eyes, does not follows.     Objective:     Vital Signs (Most Recent):  Temp: 98.7 °F (37.1 °C) (01/27/22 0800)  Pulse: 102 (01/27/22 1515)  Resp: 10 (01/27/22 1515)  BP: 134/67 (01/27/22 1515)  SpO2: 98 % (01/27/22 1515) Vital Signs (24h Range):  Temp:  [97.4 °F (36.3 °C)-99.4 °F (37.4 °C)] 98.7 °F (37.1 °C)  Pulse:  [] 102  Resp:  [8-40] 10  SpO2:  [93 %-100 %] 98 %  BP: (111-168)/(59-88) 134/67     Weight: 102.2 kg (225 lb 5 oz)  Body mass index is 31.42 kg/m².      Intake/Output Summary (Last 24 hours) at 1/27/2022 1718  Last data filed at 1/27/2022 1200  Gross per 24 hour   Intake --   Output 300 ml   Net -300 ml       Physical Exam  Vitals reviewed.   Constitutional:       General: He is not in acute distress.     Appearance: He is ill-appearing.   HENT:      Head: Normocephalic and atraumatic.      Right Ear: External ear normal.      Left Ear: External ear normal.      Nose: Nose normal.      Mouth/Throat:      Mouth: Mucous membranes are moist.      Comments: trach  Eyes:      Conjunctiva/sclera: Conjunctivae normal.      Pupils: Pupils are equal, round, and reactive to light.   Cardiovascular:      Rate and Rhythm: Regular rhythm. Tachycardia present.      Pulses: Normal pulses.      Heart sounds: Normal heart sounds.   Pulmonary:      Effort: No respiratory distress.      Comments: clear breath sounds anteriorly  Abdominal:      Palpations: Abdomen is soft.   Musculoskeletal:         General: Normal range of motion.      Cervical back: Neck supple.      Right lower leg: Edema present.      Left lower leg: Edema present.    Lymphadenopathy:      Cervical: No cervical adenopathy.   Skin:     General: Skin is warm and dry.   Neurological:      Cranial Nerves: No cranial nerve deficit.      Comments: Remains on mechanical ventilation. Will track at times         Vents:  Vent Mode: CPAP/PSV (01/27/22 0835)  Set Rate: 0 BPM (01/27/22 0835)  Vt Set: 480 mL (01/27/22 0241)  Pressure Support: 15 cmH20 (01/27/22 0835)  PEEP/CPAP: 5 cmH20 (01/27/22 0835)  Oxygen Concentration (%): 35 (01/27/22 1600)  Peak Airway Pressure: 21 cmH2O (01/27/22 0835)  Total Ve: 11 mL (01/27/22 0835)  F/VT Ratio<105 (RSBI): (!) 49.13 (01/1955)    Lines/Drains/Airways     Peripherally Inserted Central Catheter Line            PICC Double Lumen 01/05/22 left basilic 22 days          Central Venous Catheter Line                 Hemodialysis Catheter 12/30/21 0900 right internal jugular 28 days          Drain                 NG/OG Tube [place] sump 18 Fr. Left nostril -- days         Colostomy 12/18/21 1030 Descending/sigmoid LUQ 40 days          Airway                 Surgical Airway 01/04/22 [place] 23 days                Significant Labs:    CBC/Anemia Profile:  Recent Labs   Lab 01/27/22  0624   WBC 15.59*   HGB 7.2*   HCT 21.1*      MCV 86.1   RDW 13.8        Chemistries:  Recent Labs   Lab 01/27/22  0624      K 4.7      CO2 27   BUN 28*   CREATININE 2.05*   CALCIUM 7.5*       All pertinent labs within the past 24 hours have been reviewed.    Significant Imaging:  I have reviewed all pertinent imaging results/findings within the past 24 hours.    Assessment/Plan:     * Denice gangrene  - s/p multiple debridements  - wound vac in place  - ID consulted for antibiotic management: He was treated with rocephin, daptomycin, clindamycin. 12/21- change clindamycin to ampicillin and treat for another week  - remains on ampicillin, this was changed to merrem 1/9  - pathology with fungal species consistent with mucormycosis initiated on amphotericin-  mucormycosis with up to 50% mortality rate  - plan for OR tomorrow  Patient receiving amphotericin having spells of bradycardia will repeat blood cultures and do a echo to look for endocarditis  1/11- echo reviewed and no obvious vegetations. Amphotericin started on 1/7. The patient went to OR on 1/10 for debridement. Surgery note reviewed and appreciated.  1/12- back to OR today.  1/13- OR note reviewed. No purulence noted.    1/16- tmax 101.5 -- repeat BC   01/17/2022 low-grade temp cultures pending review antibiotics  01/24/2022 stable    Disseminated mucormycosis  Defer to Dr. Hall on all antibiotic choices---Her note has been reviewed and is appreciated. On amphotericin, zosyn, and gent    Hyperphosphatemia  - initiated sevelamer    Type 2 diabetes mellitus without complication, without long-term current use of insulin  - continue basal/bolus insulin  -accuchecks are reasonable currently      He had some hypoglycemia. We held levemir. Continue to monitor blood sugar and make adjustments as needed  Blood sugars improving    Acute blood loss anemia  - H/H down to 5.5/16 - pt had bleeding from his HD insertion site yesterday this has now resolved.   - will transfuse 2 units PRBCs on HD  1/2- 9.7/27.5 - repeat CBC in AM  1/4- Hgb is 7.2. Not sure if he will be transfused during surgery. IF not we will try to get  Him transfused with next HD  01/06/2021 transfuse 1 unit of packed red blood cells today  01/10/2021 needs a unit of blood  1/11- repeat H&H in am. Continue to transfuse as needed.  1/13- Hgb is 5. Transfusing today. Post transfusion H&H  1/14- CBC pending for today  1/15- 6/18 - hemodynamically stable, recheck in AM   1/16-- 6.1/17.1 - critical low blood shortage in hospital - holding transfusion until next HD since he is hemodynamically stable and not actively bleeding   01/17/2021 day transfuse blood on dialysis today  01/18/2020 to hemoglobin up to 9 after transfusion  01/25/2022 no signs of  bleeding most recent hemoglobin 8    Necrotizing fasciitis  Ongoing issue    Hypocalcemia  He is being dialyzed. Will continue to monitor. If needed can supplement    RAHAT (acute kidney injury)  - nephrology consulted   - no sign of renal recovery.  Continuing with scheduled hemodialysis for this patient.  - tunneled HD line placement 12/30  Continues with dialysis  1/26 nephrology note reviewed and appreciated. No renal recovery. Continue with HD per nephrology            On mechanically assisted ventilation  - 12/14 intubated, tracheostomy 1/4  Weaning waxes and wanes     1/27- continue to increase as tolerated. Currently doing 4 hours SIMV with reduced rate of 4. - will transition his fentanyl infusion to a patch of 50mcg today      Ventilator associated pneumonia-resolved as of 1/27/2022  - had been on almost a month of antimicrobials, high risk for MDR organisms. Gram negative on resp culture  - repeat blood cultures with NGTD  - changed unasyn to merrem 1/9---treat for 7 days and dc as long as afebrile                 FLORESITA Shafer-ACNP  Pulmonology  [facility]

## 2022-01-28 NOTE — ASSESSMENT & PLAN NOTE
Defer to Dr. Hall on all antibiotic choices---Her note has been reviewed and is appreciated.     Amphotericin until 02/03   zosyn and gent for pseudomonal double coverage until 02/01

## 2022-01-28 NOTE — ASSESSMENT & PLAN NOTE
- s/p multiple debridements  - wound vac in place  - ID consulted for antibiotic management: He was treated with rocephin, daptomycin, clindamycin. 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 Amphotericin started on 1/7. The patient went to OR on 1/10 for debridement. Surgery note reviewed and appreciated.  1/12- back to OR today.  1/13- OR note reviewed. No purulence noted.    1/27- wound vac in place

## 2022-01-28 NOTE — ASSESSMENT & PLAN NOTE
- 12/14 intubated, tracheostomy 1/4  Weaning waxes and wanes     1/27- continue to increase as tolerated. Currently doing 4 hours SIMV with reduced rate of 4. - will transition his fentanyl infusion to a patch of 50mcg today    1/28- increase SIMV trails to 5hrs TID as tolerated - seems more awake today with decrease in fentanyl

## 2022-01-28 NOTE — SUBJECTIVE & OBJECTIVE
Interval History: No acute changes for the patient.   No fevers.      Review of patient's allergies indicates:  No Known Allergies  Current Facility-Administered Medications   Medication Frequency    0.9%  NaCl infusion (for blood administration) Q24H PRN    0.9%  NaCl infusion PRN    0.9%  NaCl infusion PRN    acetaminophen tablet 1,000 mg Q6H PRN    acetaminophen tablet 500 mg Q6H PRN    albuterol nebulizer solution 2.5 mg Q4H PRN    amphotericin B liposome (AMBISOME) 450 mg in dextrose 5 % 450 mL IVPB Q24H    bisacodyL EC tablet 10 mg Daily PRN    dextromethorphan-guaiFENesin  mg/5 ml liquid 10 mL Q6H PRN    dextrose 50% injection 12.5 g PRN    diltiaZEM tablet 30 mg Q6H    fentaNYL 50 mcg/hr 1 patch Q72H    gentamicin - pharmacy to dose pharmacy to manage frequency    glucagon (human recombinant) injection 1 mg PRN    heparin (porcine) injection 4,000 Units PRN    heparin (porcine) injection 5,000 Units Q8H    insulin aspart U-100 injection 1-10 Units Q6H    metoprolol injection 5 mg Q5 Min PRN    NORepinephrine 32 mg in dextrose 5 % 250 mL infusion PRN    ondansetron injection 8 mg Q6H PRN    pantoprazole suspension 40 mg Daily    piperacillin-tazobactam (ZOSYN) 4.5 g in dextrose 5 % in water (D5W) 5 % 100 mL IVPB (MB+) Q12H    sevelamer carbonate pwpk 0.8 g TID WM    simethicone chewable tablet 80 mg TID PRN    ticagrelor tablet 90 mg BID    traZODone tablet 50 mg Nightly PRN       Objective:     Vital Signs (Most Recent):  Temp: 99.9 °F (37.7 °C) (01/27/22 2300)  Pulse: 93 (01/28/22 0000)  Resp: 19 (01/28/22 0000)  BP: (!) 140/73 (01/28/22 0000)  SpO2: 97 % (01/28/22 0000)  O2 Device (Oxygen Therapy): ventilator (01/27/22 1545) Vital Signs (24h Range):  Temp:  [98.7 °F (37.1 °C)-99.9 °F (37.7 °C)] 99.9 °F (37.7 °C)  Pulse:  [] 93  Resp:  [8-41] 19  SpO2:  [93 %-100 %] 97 %  BP: (111-168)/(53-90) 140/73     Weight: 102.2 kg (225 lb 5 oz) (01/27/22 0600)  Body mass index is  31.42 kg/m².  Body surface area is 2.26 meters squared.    I/O last 3 completed shifts:  In: -   Out: 550 [Stool:550]    Physical Exam  Vitals reviewed.   Constitutional:       General: He is not in acute distress.  HENT:      Mouth/Throat:      Mouth: Mucous membranes are moist.   Cardiovascular:      Rate and Rhythm: Regular rhythm.      Pulses: Normal pulses.   Pulmonary:      Effort: Pulmonary effort is normal.      Comments: Ventilated, resting comfortably.  Abdominal:      General: Bowel sounds are normal.      Palpations: Abdomen is soft.         Significant Labs:  BMP:   Recent Labs   Lab 01/27/22  0624   *      K 4.7      CO2 27   BUN 28*   CREATININE 2.05*   CALCIUM 7.5*     CBC:   Recent Labs   Lab 01/27/22 0624   WBC 15.59*   RBC 2.45*   HGB 7.2*   HCT 21.1*      MCV 86.1   MCH 29.4   MCHC 34.1        Significant Imaging:  Labs: Reviewed

## 2022-01-28 NOTE — ASSESSMENT & PLAN NOTE
Dialysis Monday   Continue with scheduled hemodialysis for this patient.  1/5/2022 Dialysis done today.  No other changes  1/6/2022 Continue with dialysis on tomorrow.  1/7/2022  Dialysis as scheduled today.  And dialysis on tomorrow.  1/10/2022 Dialysis as scheduled.  1/11/2022 No signs of renal recovery.  Continue with dialysis management  1/12/2022 Continue with scheduled hemodialysis for this patient.  1/13/2022 Dialysis scheduled for tomorrow.  1/14/2022 Dialysis scheduled for Saturday 1/16/2022 Tolerated dialysis on Saturday.  Condition remains critical  1/17/2022  Continue with scheduled hemodialysis for this patient.  The situation remains critical.  1/18/2022 The patient's condition is the same.  1/19/2022 Dialysis as scheduled.  Volume status is stable.  1/20/2022 Continue current management  1/21/2022 Continue with dialysis.  1/24/2022 Dialysis sessions are going well.  1/25/2022 Continue with support  1/27/2022 Dialysis as scheduled for this patient.  1/28/2022 Dialysis for today.  Condition is the same.

## 2022-01-28 NOTE — PROGRESS NOTES
Pharmacokinetic Follow Up: Gentamicin     Assessment of levels:   Pt is a dialysis patient.  His random level of 0.5 is within desired range.  Regimen Plan:   Will dispense a one time dose of 260 mg for this evening and get random level prior to HD session on Thursday.

## 2022-01-28 NOTE — SUBJECTIVE & OBJECTIVE
Interval History: opens eyes and looks around room, not following commands, getting HD     Objective:     Vital Signs (Most Recent):  Temp: 98.8 °F (37.1 °C) (01/28/22 0400)  Pulse: 99 (01/28/22 0600)  Resp: 17 (01/28/22 0600)  BP: 127/65 (01/28/22 0600)  SpO2: 100 % (01/28/22 0800) Vital Signs (24h Range):  Temp:  [98.8 °F (37.1 °C)-99.9 °F (37.7 °C)] 98.8 °F (37.1 °C)  Pulse:  [] 99  Resp:  [10-41] 17  SpO2:  [95 %-100 %] 100 %  BP: (119-168)/(53-90) 127/65     Weight: 100.6 kg (221 lb 12.5 oz)  Body mass index is 30.93 kg/m².      Intake/Output Summary (Last 24 hours) at 1/28/2022 0942  Last data filed at 1/28/2022 0700  Gross per 24 hour   Intake 1170 ml   Output 300 ml   Net 870 ml       Physical Exam  Vitals reviewed.   Constitutional:       General: He is not in acute distress.     Appearance: He is ill-appearing.   HENT:      Head: Normocephalic and atraumatic.      Right Ear: External ear normal.      Left Ear: External ear normal.      Nose: Nose normal.      Mouth/Throat:      Mouth: Mucous membranes are moist.      Comments: trach  Eyes:      Conjunctiva/sclera: Conjunctivae normal.      Pupils: Pupils are equal, round, and reactive to light.   Cardiovascular:      Rate and Rhythm: Regular rhythm. Tachycardia present.      Pulses: Normal pulses.      Heart sounds: Normal heart sounds.   Pulmonary:      Effort: Pulmonary effort is normal. No respiratory distress.      Comments: Ventilated, resting comfortably.  Abdominal:      General: Bowel sounds are normal.      Palpations: Abdomen is soft.   Musculoskeletal:         General: Normal range of motion.      Cervical back: Neck supple.      Right lower leg: Edema present.      Left lower leg: Edema present.   Lymphadenopathy:      Cervical: No cervical adenopathy.   Skin:     General: Skin is warm and dry.   Neurological:      Cranial Nerves: No cranial nerve deficit.      Comments: Remains on mechanical ventilation. Will track at times          Vents:  Vent Mode: SIMV (VC) + PS (01/28/22 0830)  Set Rate: 4 BPM (01/28/22 0830)  Vt Set: 480 mL (01/28/22 0830)  Pressure Support: 15 cmH20 (01/28/22 0830)  PEEP/CPAP: 5 cmH20 (01/28/22 0830)  Oxygen Concentration (%): 35 (01/28/22 0800)  Peak Airway Pressure: 21 cmH2O (01/28/22 0830)  Total Ve: 5.3 mL (01/28/22 0830)  F/VT Ratio<105 (RSBI): (!) 97.06 (01/28/22 0300)    Lines/Drains/Airways     Peripherally Inserted Central Catheter Line            PICC Double Lumen 01/05/22 left basilic 23 days          Central Venous Catheter Line                 Hemodialysis Catheter 12/30/21 0900 right internal jugular 29 days          Drain                 NG/OG Tube [place] sump 18 Fr. Left nostril -- days         Colostomy 12/18/21 1030 Descending/sigmoid LUQ 40 days          Airway                 Surgical Airway 01/04/22 Shiley 24 days                Significant Labs:    CBC/Anemia Profile:  Recent Labs   Lab 01/27/22  0624 01/28/22  0827   WBC 15.59* 17.08*   HGB 7.2* 6.4*   HCT 21.1* 18.4*    209   MCV 86.1 86.0   RDW 13.8 14.0        Chemistries:  Recent Labs   Lab 01/27/22  0624 01/28/22  0827    133*   K 4.7 3.9    97*   CO2 27 24   BUN 28* 44*   CREATININE 2.05* 2.85*   CALCIUM 7.5* 8.1*       All pertinent labs within the past 24 hours have been reviewed.    Significant Imaging:  I have reviewed all pertinent imaging results/findings within the past 24 hours.

## 2022-01-29 NOTE — ASSESSMENT & PLAN NOTE
- H/H down to 5.5/16 - pt had bleeding from his HD insertion site yesterday this has now resolved.   - will transfuse 2 units PRBCs on HD  1/2- 9.7/27.5 - repeat CBC in AM  1/4- Hgb is 7.2. Not sure if he will be transfused during surgery. IF not we will try to get  Him transfused with next HD  01/06/2021 transfuse 1 unit of packed red blood cells today  01/10/2021 needs a unit of blood  1/11- repeat H&H in am. Continue to transfuse as needed.  1/13- Hgb is 5. Transfusing today. Post transfusion H&H  1/14- CBC pending for today  1/15- 6/18 - hemodynamically stable, recheck in AM   1/16-- 6.1/17.1 - critical low blood shortage in hospital - holding transfusion until next HD since he is hemodynamically stable and not actively bleeding   01/17/2021 day transfuse blood on dialysis today  01/18/2020 to hemoglobin up to 9 after transfusion  01/25/2022 no signs of bleeding most recent hemoglobin 8  01/29/2022 patient will need transfusion today

## 2022-01-29 NOTE — SUBJECTIVE & OBJECTIVE
Interval History:  No history little more weight    Objective:     Vital Signs (Most Recent):  Temp: 99.8 °F (37.7 °C) (01/29/22 0000)  Pulse: (!) 58 (01/29/22 0300)  Resp: (!) 22 (01/29/22 0300)  BP: 134/78 (01/29/22 0300)  SpO2: 95 % (01/29/22 0300) Vital Signs (24h Range):  Temp:  [98.3 °F (36.8 °C)-99.8 °F (37.7 °C)] 99.8 °F (37.7 °C)  Pulse:  [] 58  Resp:  [13-48] 22  SpO2:  [95 %-100 %] 95 %  BP: (108-150)/(48-87) 134/78     Weight: 104.1 kg (229 lb 8 oz)  Body mass index is 32.01 kg/m².      Intake/Output Summary (Last 24 hours) at 1/29/2022 0534  Last data filed at 1/29/2022 0200  Gross per 24 hour   Intake 2220 ml   Output 3178 ml   Net -958 ml       Physical Exam  Vitals reviewed.   Constitutional:       Appearance: Normal appearance.      Interventions: He is not intubated.  HENT:      Head: Normocephalic and atraumatic.      Nose: Nose normal.      Mouth/Throat:      Mouth: Mucous membranes are dry.      Pharynx: Oropharynx is clear.   Eyes:      Extraocular Movements: Extraocular movements intact.      Conjunctiva/sclera: Conjunctivae normal.      Pupils: Pupils are equal, round, and reactive to light.   Cardiovascular:      Rate and Rhythm: Normal rate.      Heart sounds: Normal heart sounds. No murmur heard.      Pulmonary:      Effort: Pulmonary effort is normal. He is not intubated.      Breath sounds: Normal breath sounds.   Abdominal:      General: Abdomen is flat. Bowel sounds are normal.      Palpations: Abdomen is soft.   Musculoskeletal:         General: Normal range of motion.      Cervical back: Normal range of motion and neck supple.      Right lower leg: No edema.      Left lower leg: No edema.   Skin:     General: Skin is warm and dry.      Capillary Refill: Capillary refill takes less than 2 seconds.   Neurological:      General: No focal deficit present.      Mental Status: He is oriented to person, place, and time.         Vents:  Vent Mode: A/C (01/28/22 1925)  Set Rate: 18 BPM  (01/28/22 1925)  Vt Set: 480 mL (01/28/22 1925)  Pressure Support: 15 cmH20 (01/28/22 1730)  PEEP/CPAP: 5 cmH20 (01/28/22 1925)  Oxygen Concentration (%): 35 (01/28/22 2330)  Peak Airway Pressure: 20 cmH2O (01/28/22 1925)  Total Ve: 6.7 mL (01/28/22 1925)  F/VT Ratio<105 (RSBI): (!) 41.6 (01/28/22 1730)    Lines/Drains/Airways     Peripherally Inserted Central Catheter Line            PICC Double Lumen 01/05/22 left basilic 24 days          Central Venous Catheter Line                 Hemodialysis Catheter 12/30/21 0900 right internal jugular 29 days          Drain                 NG/OG Tube [place] sump 18 Fr. Left nostril -- days         Colostomy 12/18/21 1030 Descending/sigmoid LUQ 41 days          Airway                 Surgical Airway 01/04/22 Shiley 25 days                Significant Labs:    CBC/Anemia Profile:  Recent Labs   Lab 01/27/22  0624 01/28/22  0827   WBC 15.59* 17.08*   HGB 7.2* 6.4*   HCT 21.1* 18.4*    209   MCV 86.1 86.0   RDW 13.8 14.0        Chemistries:  Recent Labs   Lab 01/27/22  0624 01/28/22  0827    133*   K 4.7 3.9    97*   CO2 27 24   BUN 28* 44*   CREATININE 2.05* 2.85*   CALCIUM 7.5* 8.1*       All pertinent labs within the past 24 hours have been reviewed.    Significant Imaging:  I have reviewed all pertinent imaging results/findings within the past 24 hours.

## 2022-01-29 NOTE — PROGRESS NOTES
[facility]  Pulmonology  Progress Note    Patient Name: Og Garcia  MRN: [id number]  Admission Date: 12/23/2021  Hospital Length of Stay: 37 days  Code Status: Prior  Attending Provider: Cecil Abernathy DO  Primary Care Provider: Hector Arndt DNP, FNP-C   Principal Problem: Denice gangrene    Subjective:     Interval History:  No history little more weight    Objective:     Vital Signs (Most Recent):  Temp: 99.8 °F (37.7 °C) (01/29/22 0000)  Pulse: (!) 58 (01/29/22 0300)  Resp: (!) 22 (01/29/22 0300)  BP: 134/78 (01/29/22 0300)  SpO2: 95 % (01/29/22 0300) Vital Signs (24h Range):  Temp:  [98.3 °F (36.8 °C)-99.8 °F (37.7 °C)] 99.8 °F (37.7 °C)  Pulse:  [] 58  Resp:  [13-48] 22  SpO2:  [95 %-100 %] 95 %  BP: (108-150)/(48-87) 134/78     Weight: 104.1 kg (229 lb 8 oz)  Body mass index is 32.01 kg/m².      Intake/Output Summary (Last 24 hours) at 1/29/2022 0534  Last data filed at 1/29/2022 0200  Gross per 24 hour   Intake 2220 ml   Output 3178 ml   Net -958 ml       Physical Exam  Vitals reviewed.   Constitutional:       Appearance: Normal appearance.      Interventions: He is not intubated.  HENT:      Head: Normocephalic and atraumatic.      Nose: Nose normal.      Mouth/Throat:      Mouth: Mucous membranes are dry.      Pharynx: Oropharynx is clear.   Eyes:      Extraocular Movements: Extraocular movements intact.      Conjunctiva/sclera: Conjunctivae normal.      Pupils: Pupils are equal, round, and reactive to light.   Cardiovascular:      Rate and Rhythm: Normal rate.      Heart sounds: Normal heart sounds. No murmur heard.      Pulmonary:      Effort: Pulmonary effort is normal. He is not intubated.      Breath sounds: Normal breath sounds.   Abdominal:      General: Abdomen is flat. Bowel sounds are normal.      Palpations: Abdomen is soft.   Musculoskeletal:         General: Normal range of motion.      Cervical back: Normal range of motion and neck supple.       Right lower leg: No edema.      Left lower leg: No edema.   Skin:     General: Skin is warm and dry.      Capillary Refill: Capillary refill takes less than 2 seconds.   Neurological:      General: No focal deficit present.      Mental Status: He is oriented to person, place, and time.         Vents:  Vent Mode: A/C (01/28/22 1925)  Set Rate: 18 BPM (01/28/22 1925)  Vt Set: 480 mL (01/28/22 1925)  Pressure Support: 15 cmH20 (01/28/22 1730)  PEEP/CPAP: 5 cmH20 (01/28/22 1925)  Oxygen Concentration (%): 35 (01/28/22 2330)  Peak Airway Pressure: 20 cmH2O (01/28/22 1925)  Total Ve: 6.7 mL (01/28/22 1925)  F/VT Ratio<105 (RSBI): (!) 41.6 (01/28/22 1730)    Lines/Drains/Airways     Peripherally Inserted Central Catheter Line            PICC Double Lumen 01/05/22 left basilic 24 days          Central Venous Catheter Line                 Hemodialysis Catheter 12/30/21 0900 right internal jugular 29 days          Drain                 NG/OG Tube [place] sump 18 Fr. Left nostril -- days         Colostomy 12/18/21 1030 Descending/sigmoid LUQ 41 days          Airway                 Surgical Airway 01/04/22 Shiley 25 days                Significant Labs:    CBC/Anemia Profile:  Recent Labs   Lab 01/27/22  0624 01/28/22  0827   WBC 15.59* 17.08*   HGB 7.2* 6.4*   HCT 21.1* 18.4*    209   MCV 86.1 86.0   RDW 13.8 14.0        Chemistries:  Recent Labs   Lab 01/27/22  0624 01/28/22  0827    133*   K 4.7 3.9    97*   CO2 27 24   BUN 28* 44*   CREATININE 2.05* 2.85*   CALCIUM 7.5* 8.1*       All pertinent labs within the past 24 hours have been reviewed.    Significant Imaging:  I have reviewed all pertinent imaging results/findings within the past 24 hours.    Assessment/Plan:     * Denice gangrene  - s/p multiple debridements  - wound vac in place  - ID consulted for antibiotic management: He was treated with rocephin, daptomycin, clindamycin. 12/21- change clindamycin to ampicillin and treat for another week  -  remains on ampicillin, this was changed to merrem 1/9  - pathology with fungal species consistent with mucormycosis initiated on amphotericin- mucormycosis with up to 50% mortality rate  - plan for OR tomorrow  Patient receiving amphotericin having spells of bradycardia will repeat blood cultures and do a echo to look for endocarditis  1/11- echo reviewed and no obvious vegetations. Amphotericin started on 1/7. The patient went to OR on 1/10 for debridement. Surgery note reviewed and appreciated.  1/12- back to OR today.  1/13- OR note reviewed. No purulence noted.    1/27- wound vac in place     On mechanically assisted ventilation  - 12/14 intubated, tracheostomy 1/4  Weaning waxes and wanes     1/27- continue to increase as tolerated. Currently doing 4 hours SIMV with reduced rate of 4. - will transition his fentanyl infusion to a patch of 50mcg today    1/28- increase SIMV trails to 5hrs TID as tolerated - seems more awake today with decrease in fentanyl     RAHAT (acute kidney injury)  - tunneled HD line placement 12/30    HD MWF            Hypertension  Currently hypotensive. Holding any antihypertensives  BP looks a little better this am. Weaning pressor as tolerated  1/21- No longer on pressor.   1/26- hemodynamically stable    Disseminated mucormycosis  Defer to Dr. Hall on all antibiotic choices---Her note has been reviewed and is appreciated.     Amphotericin until 02/03   zosyn and gent for pseudomonal double coverage until 02/01      Hyperphosphatemia  - initiated sevelamer    Type 2 diabetes mellitus without complication, without long-term current use of insulin  - continue basal/bolus insulin  -accuchecks are reasonable currently  Sugars are better    Acute blood loss anemia  - H/H down to 5.5/16 - pt had bleeding from his HD insertion site yesterday this has now resolved.   - will transfuse 2 units PRBCs on HD  1/2- 9.7/27.5 - repeat CBC in AM  1/4- Hgb is 7.2. Not sure if he will be transfused  during surgery. IF not we will try to get  Him transfused with next HD  01/06/2021 transfuse 1 unit of packed red blood cells today  01/10/2021 needs a unit of blood  1/11- repeat H&H in am. Continue to transfuse as needed.  1/13- Hgb is 5. Transfusing today. Post transfusion H&H  1/14- CBC pending for today  1/15- 6/18 - hemodynamically stable, recheck in AM   1/16-- 6.1/17.1 - critical low blood shortage in hospital - holding transfusion until next HD since he is hemodynamically stable and not actively bleeding   01/17/2021 day transfuse blood on dialysis today  01/18/2020 to hemoglobin up to 9 after transfusion  01/25/2022 no signs of bleeding most recent hemoglobin 8  01/29/2022 patient will need transfusion today    Hypocalcemia  He is being dialyzed. Will continue to monitor. If needed can supplement                 Jose Urban MD  Pulmonology  [facility]

## 2022-01-29 NOTE — PLAN OF CARE
Problem: Adult Inpatient Plan of Care  Goal: Plan of Care Review  Outcome: Ongoing, Progressing     Problem: Adjustment to Illness (Sepsis/Septic Shock)  Goal: Optimal Coping  Outcome: Ongoing, Progressing     Problem: Bleeding (Sepsis/Septic Shock)  Goal: Absence of Bleeding  Outcome: Ongoing, Progressing     Problem: Glycemic Control Impaired (Sepsis/Septic Shock)  Goal: Blood Glucose Level Within Desired Range  Outcome: Ongoing, Progressing     Problem: Infection Progression (Sepsis/Septic Shock)  Goal: Absence of Infection Signs and Symptoms  Outcome: Ongoing, Progressing     Problem: Fluid and Electrolyte Imbalance (Acute Kidney Injury/Impairment)  Goal: Fluid and Electrolyte Balance  Outcome: Ongoing, Progressing     Problem: Renal Function Impairment (Acute Kidney Injury/Impairment)  Goal: Effective Renal Function  Outcome: Ongoing, Progressing     Problem: Infection  Goal: Absence of Infection Signs and Symptoms  Outcome: Ongoing, Progressing     Problem: Fall Injury Risk  Goal: Absence of Fall and Fall-Related Injury  Outcome: Ongoing, Progressing     Problem: Ventilator-Induced Lung Injury (Mechanical Ventilation, Invasive)  Goal: Absence of Ventilator-Induced Lung Injury  Outcome: Ongoing, Progressing

## 2022-01-30 NOTE — PROGRESS NOTES
[facility]  Pulmonology  Progress Note    Patient Name: Og Garcia  MRN: [id number]  Admission Date: 12/23/2021  Hospital Length of Stay: 38 days  Code Status: Prior  Attending Provider: Cecil Abernathy DO  Primary Care Provider: Hector Arndt DNP, FNP-C   Principal Problem: Denice gangrene    Subjective:     Interval History:  Patient seemed open eyes now no history    Objective:     Vital Signs (Most Recent):  Temp: 98.6 °F (37 °C) (01/30/22 0400)  Pulse: 109 (01/30/22 0400)  Resp: 20 (01/30/22 0400)  BP: (!) 141/87 (01/30/22 0400)  SpO2: (!) 89 % (01/30/22 0400) Vital Signs (24h Range):  Temp:  [97.9 °F (36.6 °C)-99.8 °F (37.7 °C)] 98.6 °F (37 °C)  Pulse:  [] 109  Resp:  [7-38] 20  SpO2:  [87 %-100 %] 89 %  BP: ()/(50-92) 141/87     Weight: 104.1 kg (229 lb 8 oz)  Body mass index is 32.01 kg/m².      Intake/Output Summary (Last 24 hours) at 1/30/2022 0537  Last data filed at 1/29/2022 2101  Gross per 24 hour   Intake 1500 ml   Output --   Net 1500 ml       Physical Exam  Vitals reviewed.   Constitutional:       Appearance: Normal appearance.      Interventions: He is not intubated.  HENT:      Head: Normocephalic and atraumatic.      Nose: Nose normal.      Mouth/Throat:      Mouth: Mucous membranes are dry.      Pharynx: Oropharynx is clear.   Eyes:      Extraocular Movements: Extraocular movements intact.      Conjunctiva/sclera: Conjunctivae normal.      Pupils: Pupils are equal, round, and reactive to light.   Cardiovascular:      Rate and Rhythm: Normal rate.      Heart sounds: Normal heart sounds. No murmur heard.      Pulmonary:      Effort: Pulmonary effort is normal. He is not intubated.      Breath sounds: Normal breath sounds.   Abdominal:      General: Abdomen is flat. Bowel sounds are normal.      Palpations: Abdomen is soft.   Musculoskeletal:         General: Normal range of motion.      Cervical back: Normal range of motion and neck supple.       Right lower leg: No edema.      Left lower leg: No edema.   Skin:     General: Skin is warm and dry.      Capillary Refill: Capillary refill takes less than 2 seconds.   Neurological:      General: No focal deficit present.      Mental Status: He is alert and oriented to person, place, and time.   Psychiatric:         Mood and Affect: Mood normal.         Behavior: Behavior normal.         Vents:  Vent Mode: A/C (01/30/22 0056)  Set Rate: 18 BPM (01/30/22 0056)  Vt Set: 480 mL (01/30/22 0056)  Pressure Support: 15 cmH20 (01/29/22 1945)  PEEP/CPAP: 5 cmH20 (01/30/22 0056)  Oxygen Concentration (%): 50 (01/30/22 0056)  Peak Airway Pressure: 28 cmH2O (01/30/22 0056)  Total Ve: 8.6 mL (01/30/22 0056)  F/VT Ratio<105 (RSBI): (!) 40.2 (01/29/22 1800)    Lines/Drains/Airways     Peripherally Inserted Central Catheter Line            PICC Double Lumen 01/05/22 left basilic 25 days          Central Venous Catheter Line                 Hemodialysis Catheter 12/30/21 0900 right internal jugular 30 days          Drain                 NG/OG Tube [place] sump 18 Fr. Left nostril -- days         Colostomy 12/18/21 1030 Descending/sigmoid LUQ 42 days          Airway                 Surgical Airway 01/04/22 Shiley 26 days                Significant Labs:    CBC/Anemia Profile:  Recent Labs   Lab 01/28/22  0827 01/29/22  1311 01/29/22  1508   WBC 17.08* 8.38 16.72*   HGB 6.4* 7.2* 6.4*   HCT 18.4* 22.4* 19.7*    174 262   MCV 86.0 76.7* 91.6   RDW 14.0 16.7* 14.6*        Chemistries:  Recent Labs   Lab 01/28/22  0827 01/29/22  1311 01/29/22  1507   * 136 137   K 3.9 5.2* 4.1   CL 97* 100 100   CO2 24 28 26   BUN 44* 24* 47*   CREATININE 2.85* 3.86* 2.62*   CALCIUM 8.1* 6.2* 8.2*   ALBUMIN  --   --  1.0*   PROT  --   --  7.0   BILITOT  --   --  0.4   ALKPHOS  --   --  349*   ALT  --   --  35   AST  --   --  48*       All pertinent labs within the past 24 hours have been reviewed.    Significant Imaging:  I have reviewed all  pertinent imaging results/findings within the past 24 hours.    Assessment/Plan:     * Denice gangrene  - s/p multiple debridements  - wound vac in place  - ID consulted for antibiotic management: He was treated with rocephin, daptomycin, clindamycin. 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 Amphotericin started on 1/7. The patient went to OR on 1/10 for debridement. Surgery note reviewed and appreciated.  1/12- back to OR today.  1/13- OR note reviewed. No purulence noted.    1/27- wound vac in place     On mechanically assisted ventilation  - 12/14 intubated, tracheostomy 1/4  Weaning waxes and wanes     1/27- continue to increase as tolerated. Currently doing 4 hours SIMV with reduced rate of 4. - will transition his fentanyl infusion to a patch of 50mcg today    Continue weaning from the vent    RAHAT (acute kidney injury)  - tunneled HD line placement 12/30    HD MWF            Hypertension  Currently hypotensive. Holding any antihypertensives  BP looks a little better this am. Weaning pressor as tolerated  1/21- No longer on pressor.   1/26- hemodynamically stable    Disseminated mucormycosis  Defer to Dr. Hall on all antibiotic choices---Her note has been reviewed and is appreciated.     Amphotericin until 02/03   zosyn and gent for pseudomonal double coverage until 02/01      Type 2 diabetes mellitus without complication, without long-term current use of insulin  - continue basal/bolus insulin  -accuchecks are reasonable currently  Sugars are better    Acute blood loss anemia  - H/H down to  5.5/16 - pt had bleeding from his HD insertion site yesterday this has now resolved.   - will transfuse 2 units PRBCs on HD  1/2- 9.7/27.5 - repeat CBC in AM  1/4- Hgb is 7.2. Not sure if he will be transfused during surgery. IF not we will try to get  Him transfused with next HD  01/06/2021 transfuse 1 unit of packed red blood cells today  01/10/2021 needs a unit of blood  1/11- repeat H&H in am. Continue to transfuse as needed.  1/13- Hgb is 5. Transfusing today. Post transfusion H&H  1/14- CBC pending for today  1/15- 6/18 - hemodynamically stable, recheck in AM   1/16-- 6.1/17.1 - critical low blood shortage in hospital - holding transfusion until next HD since he is hemodynamically stable and not actively bleeding   01/17/2021 day transfuse blood on dialysis today  01/18/2020 to hemoglobin up to 9 after transfusion  01/25/2022 no signs of bleeding most recent hemoglobin 8  01/29/2022 patient will need transfusion today    Hypocalcemia  He is being dialyzed. Will continue to monitor. If needed can supplement                 Jose Urban MD  Pulmonology  [facility]

## 2022-01-30 NOTE — SUBJECTIVE & OBJECTIVE
Interval History:  He remains on the ventilator via tracheostomy.  Blood pressure stable during dialysis    Review of patient's allergies indicates:  No Known Allergies  Current Facility-Administered Medications   Medication Frequency    0.9%  NaCl infusion (for blood administration) Q24H PRN    0.9%  NaCl infusion (for blood administration) Q24H PRN    0.9%  NaCl infusion PRN    0.9%  NaCl infusion PRN    acetaminophen tablet 1,000 mg Q6H PRN    acetaminophen tablet 500 mg Q6H PRN    albuterol nebulizer solution 2.5 mg Q4H PRN    amphotericin B liposome (AMBISOME) 450 mg in dextrose 5 % 450 mL IVPB Q24H    bisacodyL EC tablet 10 mg Daily PRN    calcium gluconate 1 g in dextrose 5 % in water (D5W) 5 % 100 mL IVPB (MB+) Once    dextromethorphan-guaiFENesin  mg/5 ml liquid 10 mL Q6H PRN    dextrose 50% injection 12.5 g PRN    diltiaZEM tablet 30 mg Q6H    fentaNYL 50 mcg/hr 1 patch Q72H    gentamicin - pharmacy to dose pharmacy to manage frequency    glucagon (human recombinant) injection 1 mg PRN    heparin (porcine) injection 4,000 Units PRN    heparin (porcine) injection 5,000 Units Q8H    insulin aspart U-100 injection 1-10 Units Q6H    metoprolol injection 5 mg Q5 Min PRN    NORepinephrine 32 mg in dextrose 5 % 250 mL infusion PRN    ondansetron injection 8 mg Q6H PRN    pantoprazole suspension 40 mg Daily    piperacillin-tazobactam (ZOSYN) 4.5 g in dextrose 5 % in water (D5W) 5 % 100 mL IVPB (MB+) Q12H    sevelamer carbonate pwpk 0.8 g TID WM    simethicone chewable tablet 80 mg TID PRN    ticagrelor tablet 90 mg BID    traZODone tablet 50 mg Nightly PRN       Objective:     Vital Signs (Most Recent):  Temp: 99.8 °F (37.7 °C) (01/29/22 2000)  Pulse: 108 (01/29/22 2200)  Resp: 20 (01/29/22 2200)  BP: (!) 152/86 (01/29/22 2200)  SpO2: (!) 90 % (01/29/22 2200)  O2 Device (Oxygen Therapy): ventilator (01/29/22 1945) Vital Signs (24h Range):  Temp:  [97.9 °F (36.6 °C)-99.8 °F (37.7  °C)] 99.8 °F (37.7 °C)  Pulse:  [] 108  Resp:  [7-38] 20  SpO2:  [87 %-100 %] 90 %  BP: ()/() 152/86     Weight: 104.1 kg (229 lb 8 oz) (01/29/22 0058)  Body mass index is 32.01 kg/m².  Body surface area is 2.28 meters squared.    I/O last 3 completed shifts:  In: 3075 [Other:500; NG/GT:2025; IV Piggyback:550]  Out: 3178 [Other:2728; Stool:450]    Physical Exam  Vitals reviewed.   Constitutional:       General: He is not in acute distress.     Appearance: He is ill-appearing.   HENT:      Head: Normocephalic and atraumatic.      Mouth/Throat:      Comments: trach  Eyes:      Pupils: Pupils are equal, round, and reactive to light.   Cardiovascular:      Rate and Rhythm: Regular rhythm. Tachycardia present.      Heart sounds: Normal heart sounds.   Pulmonary:      Effort: Pulmonary effort is normal. No respiratory distress.      Comments: Ventilated, resting comfortably.  Abdominal:      General: Bowel sounds are normal.      Palpations: Abdomen is soft.   Musculoskeletal:         General: Normal range of motion.      Cervical back: Neck supple.      Right lower leg: Edema present.      Left lower leg: Edema present.   Lymphadenopathy:      Cervical: No cervical adenopathy.   Skin:     General: Skin is warm and dry.   Neurological:      Cranial Nerves: No cranial nerve deficit.         Significant Labs:  BMP:   Recent Labs   Lab 01/29/22  1507   *      K 4.1      CO2 26   BUN 47*   CREATININE 2.62*   CALCIUM 8.2*        Significant Imaging:

## 2022-01-30 NOTE — PLAN OF CARE
Problem: Adult Inpatient Plan of Care  Goal: Plan of Care Review  Outcome: Ongoing, Progressing     Problem: Glycemic Control Impaired (Sepsis/Septic Shock)  Goal: Blood Glucose Level Within Desired Range  Outcome: Ongoing, Progressing     Problem: Infection Progression (Sepsis/Septic Shock)  Goal: Absence of Infection Signs and Symptoms  Outcome: Ongoing, Progressing     Problem: Infection  Goal: Absence of Infection Signs and Symptoms  Outcome: Ongoing, Progressing     Problem: Skin and Tissue Injury (Mechanical Ventilation, Invasive)  Goal: Absence of Device-Related Skin and Tissue Injury  Outcome: Ongoing, Progressing     Problem: Diabetes Comorbidity  Goal: Blood Glucose Level Within Targeted Range  Outcome: Ongoing, Progressing     Problem: Impaired Wound Healing  Goal: Optimal Wound Healing  Outcome: Ongoing, Progressing

## 2022-01-30 NOTE — ASSESSMENT & PLAN NOTE
- 12/14 intubated, tracheostomy 1/4  Weaning waxes and wanes     1/27- continue to increase as tolerated. Currently doing 4 hours SIMV with reduced rate of 4. - will transition his fentanyl infusion to a patch of 50mcg today    Continue weaning from the vent

## 2022-01-30 NOTE — NURSING
Spoke with blood bank related to status of prbc. Per blood bank, pt has developed an antibody and blood is not available at this time. Blood bank will notify nurse staff when blood is available.

## 2022-01-30 NOTE — SUBJECTIVE & OBJECTIVE
Interval History:  Patient seemed open eyes now no history    Objective:     Vital Signs (Most Recent):  Temp: 98.6 °F (37 °C) (01/30/22 0400)  Pulse: 109 (01/30/22 0400)  Resp: 20 (01/30/22 0400)  BP: (!) 141/87 (01/30/22 0400)  SpO2: (!) 89 % (01/30/22 0400) Vital Signs (24h Range):  Temp:  [97.9 °F (36.6 °C)-99.8 °F (37.7 °C)] 98.6 °F (37 °C)  Pulse:  [] 109  Resp:  [7-38] 20  SpO2:  [87 %-100 %] 89 %  BP: ()/(50-92) 141/87     Weight: 104.1 kg (229 lb 8 oz)  Body mass index is 32.01 kg/m².      Intake/Output Summary (Last 24 hours) at 1/30/2022 0537  Last data filed at 1/29/2022 2101  Gross per 24 hour   Intake 1500 ml   Output --   Net 1500 ml       Physical Exam  Vitals reviewed.   Constitutional:       Appearance: Normal appearance.      Interventions: He is not intubated.  HENT:      Head: Normocephalic and atraumatic.      Nose: Nose normal.      Mouth/Throat:      Mouth: Mucous membranes are dry.      Pharynx: Oropharynx is clear.   Eyes:      Extraocular Movements: Extraocular movements intact.      Conjunctiva/sclera: Conjunctivae normal.      Pupils: Pupils are equal, round, and reactive to light.   Cardiovascular:      Rate and Rhythm: Normal rate.      Heart sounds: Normal heart sounds. No murmur heard.      Pulmonary:      Effort: Pulmonary effort is normal. He is not intubated.      Breath sounds: Normal breath sounds.   Abdominal:      General: Abdomen is flat. Bowel sounds are normal.      Palpations: Abdomen is soft.   Musculoskeletal:         General: Normal range of motion.      Cervical back: Normal range of motion and neck supple.      Right lower leg: No edema.      Left lower leg: No edema.   Skin:     General: Skin is warm and dry.      Capillary Refill: Capillary refill takes less than 2 seconds.   Neurological:      General: No focal deficit present.      Mental Status: He is alert and oriented to person, place, and time.   Psychiatric:         Mood and Affect: Mood normal.          Behavior: Behavior normal.         Vents:  Vent Mode: A/C (01/30/22 0056)  Set Rate: 18 BPM (01/30/22 0056)  Vt Set: 480 mL (01/30/22 0056)  Pressure Support: 15 cmH20 (01/29/22 1945)  PEEP/CPAP: 5 cmH20 (01/30/22 0056)  Oxygen Concentration (%): 50 (01/30/22 0056)  Peak Airway Pressure: 28 cmH2O (01/30/22 0056)  Total Ve: 8.6 mL (01/30/22 0056)  F/VT Ratio<105 (RSBI): (!) 40.2 (01/29/22 1800)    Lines/Drains/Airways     Peripherally Inserted Central Catheter Line            PICC Double Lumen 01/05/22 left basilic 25 days          Central Venous Catheter Line                 Hemodialysis Catheter 12/30/21 0900 right internal jugular 30 days          Drain                 NG/OG Tube [place] sump 18 Fr. Left nostril -- days         Colostomy 12/18/21 1030 Descending/sigmoid LUQ 42 days          Airway                 Surgical Airway 01/04/22 Shiley 26 days                Significant Labs:    CBC/Anemia Profile:  Recent Labs   Lab 01/28/22  0827 01/29/22  1311 01/29/22  1508   WBC 17.08* 8.38 16.72*   HGB 6.4* 7.2* 6.4*   HCT 18.4* 22.4* 19.7*    174 262   MCV 86.0 76.7* 91.6   RDW 14.0 16.7* 14.6*        Chemistries:  Recent Labs   Lab 01/28/22  0827 01/29/22  1311 01/29/22  1507   * 136 137   K 3.9 5.2* 4.1   CL 97* 100 100   CO2 24 28 26   BUN 44* 24* 47*   CREATININE 2.85* 3.86* 2.62*   CALCIUM 8.1* 6.2* 8.2*   ALBUMIN  --   --  1.0*   PROT  --   --  7.0   BILITOT  --   --  0.4   ALKPHOS  --   --  349*   ALT  --   --  35   AST  --   --  48*       All pertinent labs within the past 24 hours have been reviewed.    Significant Imaging:  I have reviewed all pertinent imaging results/findings within the past 24 hours.

## 2022-01-30 NOTE — PROGRESS NOTES
[facility]  Nephrology  Progress Note    Patient Name: Og Garcia  MRN: [id number]  Admission Date: 12/23/2021  Hospital Length of Stay: 37 days  Attending Provider: Cecil Abernathy DO   Primary Care Physician: Hector Arndt DNP, FNP-C  Principal Problem:Denice gangrene    Subjective:     HPI: The patient is known from the previous nephrology consult at [facility].  He is no at Specialty and continues to need dialysis support.      Interval History:  He remains on the ventilator via tracheostomy.  Blood pressure stable during dialysis    Review of patient's allergies indicates:  No Known Allergies  Current Facility-Administered Medications   Medication Frequency    0.9%  NaCl infusion (for blood administration) Q24H PRN    0.9%  NaCl infusion (for blood administration) Q24H PRN    0.9%  NaCl infusion PRN    0.9%  NaCl infusion PRN    acetaminophen tablet 1,000 mg Q6H PRN    acetaminophen tablet 500 mg Q6H PRN    albuterol nebulizer solution 2.5 mg Q4H PRN    amphotericin B liposome (AMBISOME) 450 mg in dextrose 5 % 450 mL IVPB Q24H    bisacodyL EC tablet 10 mg Daily PRN    calcium gluconate 1 g in dextrose 5 % in water (D5W) 5 % 100 mL IVPB (MB+) Once    dextromethorphan-guaiFENesin  mg/5 ml liquid 10 mL Q6H PRN    dextrose 50% injection 12.5 g PRN    diltiaZEM tablet 30 mg Q6H    fentaNYL 50 mcg/hr 1 patch Q72H    gentamicin - pharmacy to dose pharmacy to manage frequency    glucagon (human recombinant) injection 1 mg PRN    heparin (porcine) injection 4,000 Units PRN    heparin (porcine) injection 5,000 Units Q8H    insulin aspart U-100 injection 1-10 Units Q6H    metoprolol injection 5 mg Q5 Min PRN    NORepinephrine 32 mg in dextrose 5 % 250 mL infusion PRN    ondansetron injection 8 mg Q6H PRN    pantoprazole suspension 40 mg Daily    piperacillin-tazobactam (ZOSYN) 4.5 g in dextrose 5 % in water (D5W) 5 % 100 mL IVPB (MB+) Q12H    sevelamer  carbonate pwpk 0.8 g TID WM    simethicone chewable tablet 80 mg TID PRN    ticagrelor tablet 90 mg BID    traZODone tablet 50 mg Nightly PRN       Objective:     Vital Signs (Most Recent):  Temp: 99.8 °F (37.7 °C) (01/29/22 2000)  Pulse: 108 (01/29/22 2200)  Resp: 20 (01/29/22 2200)  BP: (!) 152/86 (01/29/22 2200)  SpO2: (!) 90 % (01/29/22 2200)  O2 Device (Oxygen Therapy): ventilator (01/29/22 1945) Vital Signs (24h Range):  Temp:  [97.9 °F (36.6 °C)-99.8 °F (37.7 °C)] 99.8 °F (37.7 °C)  Pulse:  [] 108  Resp:  [7-38] 20  SpO2:  [87 %-100 %] 90 %  BP: ()/() 152/86     Weight: 104.1 kg (229 lb 8 oz) (01/29/22 0058)  Body mass index is 32.01 kg/m².  Body surface area is 2.28 meters squared.    I/O last 3 completed shifts:  In: 3075 [Other:500; NG/GT:2025; IV Piggyback:550]  Out: 3178 [Other:2728; Stool:450]    Physical Exam  Vitals reviewed.   Constitutional:       General: He is not in acute distress.     Appearance: He is ill-appearing.   HENT:      Head: Normocephalic and atraumatic.      Mouth/Throat:      Comments: trach  Eyes:      Pupils: Pupils are equal, round, and reactive to light.   Cardiovascular:      Rate and Rhythm: Regular rhythm. Tachycardia present.      Heart sounds: Normal heart sounds.   Pulmonary:      Effort: Pulmonary effort is normal. No respiratory distress.      Comments: Ventilated, resting comfortably.  Abdominal:      General: Bowel sounds are normal.      Palpations: Abdomen is soft.   Musculoskeletal:         General: Normal range of motion.      Cervical back: Neck supple.      Right lower leg: Edema present.      Left lower leg: Edema present.   Lymphadenopathy:      Cervical: No cervical adenopathy.   Skin:     General: Skin is warm and dry.   Neurological:      Cranial Nerves: No cranial nerve deficit.         Significant Labs:  BMP:   Recent Labs   Lab 01/29/22  1507   *      K 4.1      CO2 26   BUN 47*   CREATININE 2.62*   CALCIUM 8.2*         Significant Imaging:      Assessment/Plan:     * Denice gangrene  Continue wound care      RAHAT (acute kidney injury)  Stable during dialysis today    On mechanically assisted ventilation  Wean as tolerated        Thank you for your consult.     Huber Mcnamara MD  Nephrology  [facility]

## 2022-01-31 NOTE — ASSESSMENT & PLAN NOTE
Currently hypotensive. Holding any antihypertensives  BP looks a little better this am. Weaning pressor as tolerated  1/21- No longer on pressor.   1/31- hemodynamically stable

## 2022-01-31 NOTE — SUBJECTIVE & OBJECTIVE
Interval History: The patient's condition is about the same.  He remains ventilated.  Continuing with dialysis support.    Review of patient's allergies indicates:  No Known Allergies  Current Facility-Administered Medications   Medication Frequency    0.9%  NaCl infusion (for blood administration) Q24H PRN    0.9%  NaCl infusion PRN    0.9%  NaCl infusion PRN    acetaminophen tablet 1,000 mg Q6H PRN    acetaminophen tablet 500 mg Q6H PRN    albuterol nebulizer solution 2.5 mg Q4H PRN    amphotericin B liposome (AMBISOME) 450 mg in dextrose 5 % 450 mL IVPB Q24H    bisacodyL EC tablet 10 mg Daily PRN    calcium gluconate 1 g in dextrose 5 % in water (D5W) 5 % 100 mL IVPB (MB+) Once    dextromethorphan-guaiFENesin  mg/5 ml liquid 10 mL Q6H PRN    dextrose 50% injection 12.5 g PRN    diltiaZEM tablet 30 mg Q6H    fentaNYL 50 mcg/hr 1 patch Q72H    gentamicin (GARAMYCIN) 260 mg in sodium chloride 0.9% 100 mL IVPB Once    gentamicin - pharmacy to dose pharmacy to manage frequency    glucagon (human recombinant) injection 1 mg PRN    heparin (porcine) injection 4,000 Units PRN    heparin (porcine) injection 5,000 Units Q8H    insulin aspart U-100 injection 1-10 Units Q6H    metoprolol injection 5 mg Q5 Min PRN    mupirocin 2 % ointment BID    NORepinephrine 32 mg in dextrose 5 % 250 mL infusion PRN    ondansetron injection 8 mg Q6H PRN    pantoprazole suspension 40 mg Daily    piperacillin-tazobactam (ZOSYN) 4.5 g in dextrose 5 % in water (D5W) 5 % 100 mL IVPB (MB+) Q12H    sevelamer carbonate pwpk 0.8 g TID WM    simethicone chewable tablet 80 mg TID PRN    ticagrelor tablet 90 mg BID    traZODone tablet 50 mg Nightly PRN       Objective:     Vital Signs (Most Recent):  Temp: 99 °F (37.2 °C) (01/31/22 0600)  Pulse: 97 (01/31/22 1203)  Resp: (!) 24 (01/31/22 1203)  BP: 128/70 (01/31/22 1203)  SpO2: 100 % (01/31/22 1203)  O2 Device (Oxygen Therapy): ventilator (01/31/22 1220) Vital Signs  (24h Range):  Temp:  [98.2 °F (36.8 °C)-99 °F (37.2 °C)] 99 °F (37.2 °C)  Pulse:  [] 97  Resp:  [11-44] 24  SpO2:  [90 %-100 %] 100 %  BP: (113-177)/(60-92) 128/70     Weight: 103 kg (227 lb 1.2 oz) (01/31/22 0600)  Body mass index is 31.67 kg/m².  Body surface area is 2.27 meters squared.    I/O last 3 completed shifts:  In: 3601.7 [Blood:301.7; NG/GT:2100; IV Piggyback:1200]  Out: 750 [Stool:750]    Physical Exam  Vitals reviewed.   Cardiovascular:      Rate and Rhythm: Regular rhythm.      Heart sounds: Normal heart sounds.   Pulmonary:      Breath sounds: Normal breath sounds.   Abdominal:      Palpations: Abdomen is soft.   Neurological:      Mental Status: He is alert.         Significant Labs:  BMP:   Recent Labs   Lab 01/31/22  0629   *   *   K 4.4   CL 98   CO2 21   BUN 73*   CREATININE 3.22*   CALCIUM 8.0*   MG 2.2     CBC:   Recent Labs   Lab 01/31/22  1253   WBC 20.65*   RBC 2.26*   HGB 6.9*   HCT 19.4*      MCV 85.8   MCH 30.5   MCHC 35.6        Significant Imaging:  Labs: Reviewed

## 2022-01-31 NOTE — PROGRESS NOTES
Progress Note                                                           Infectious disease   Admit Date: 12/23/2021    SUBJECTIVE:     Follow-up For:  Denice gangrene    HPI/Interval history:  Patient doing fair, overall somewhat improved from a week ago when I last saw him.  No fever, wounds improving in appearance.  Per nurse endotracheal secretions seem to have decreased a little bit.    Review of Systems:    Unable to obtain as he is on the vent.      OBJECTIVE:     Vital Signs Range (Last 24H):  Temp:  [98.2 °F (36.8 °C)-99 °F (37.2 °C)]   Pulse:  []   Resp:  [11-44]   BP: (113-177)/(60-92)   SpO2:  [90 %-100 %]     Physical Exam   Constitutional: Pt is arousable but I did not get him to follow commands  Head: Normocephalic, scarring about right orbit from old injury.   Eyes, nose and throat:  Pale moist mucosa, anicteric and acyanotic, enucleated right eye, unable to examine mouth as not able to get it open  Neck: Neck with tracheostomy, less mucoid drainage around the tubing  Cardiovascular: Normal rate and regular rhythm.  S1 and S2 appreciated by ascultation, no murmurs  Pulmonary/Chest: Effort normal, no crepitations or wheezes auscultated   Abdomen:  Wound VAC to large midline wound, colostomy present, not distended, normal bowel sounds.  Wounds in groin/buttock regions also better.  Extremities:  Generalized body edema present.   Skin:  Blister to dorsum of right hand starting to dry a little    Laboratory:  CBC:   Recent Labs   Lab 01/31/22  1253   WBC 20.65*   RBC 2.26*   HGB 6.9*   HCT 19.4*      MCV 85.8   MCH 30.5   MCHC 35.6     BMP:   Recent Labs   Lab 01/31/22  0629   *   *   K 4.4   CL 98   CO2 21   BUN 73*   CREATININE 3.22*   CALCIUM 8.0*   MG 2.2     CMP:   Recent Labs   Lab 01/31/22  0629   *   CALCIUM 8.0*   ALBUMIN 0.9*   PROT 7.1   *   K 4.4   CO2 21   CL 98   BUN 73*   CREATININE 3.22*   ALKPHOS 337*   ALT 44   AST 65*   BILITOT 0.5      Microbiology Results (last 7 days)     ** No results found for the last 168 hours. 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 Necrotizing fasciitis involving perineum and extending up into pelvis and abdomen admitted originally in mid December.  Infection is polymicrobial including several bacteria along with mucor spp.  His wounds are improving in appearance.  2. Multi organ failure due to above, on mechanical ventilation, requiring hemodialysis  3. Vent associated pneumonia caused by Pseudomonas; improving clinically.   4.  persisting leukocytosis  5. History of diabetes        PLAN: Continue current antimicrobials.  I am concerned about his significant leukocytosis and will monitor this for now.  He has about 2 more days of antibiotics for his pneumonia, we will discuss with surgery their impression of his wounds and try to come up with our stop date for his amphotericin.

## 2022-01-31 NOTE — ASSESSMENT & PLAN NOTE
- H/H down to 5.5/16 - pt had bleeding from his HD insertion site yesterday this has now resolved.   - will transfuse 2 units PRBCs on HD  1/2- 9.7/27.5 - repeat CBC in AM  1/4- Hgb is 7.2. Not sure if he will be transfused during surgery. IF not we will try to get  Him transfused with next HD  01/06/2021 transfuse 1 unit of packed red blood cells today  01/10/2021 needs a unit of blood  1/11- repeat H&H in am. Continue to transfuse as needed.  1/13- Hgb is 5. Transfusing today. Post transfusion H&H  1/14- CBC pending for today  1/15- 6/18 - hemodynamically stable, recheck in AM   1/16-- 6.1/17.1 - critical low blood shortage in hospital - holding transfusion until next HD since he is hemodynamically stable and not actively bleeding   01/17/2021 day transfuse blood on dialysis today  01/18/2020 to hemoglobin up to 9 after transfusion  01/25/2022 no signs of bleeding most recent hemoglobin 8  01/29/2022 patient will need transfusion today  1/31- Hgb 6.7 yesterday. Can transfuse with next HD session

## 2022-01-31 NOTE — PROGRESS NOTES
Pharmacokinetic Follow Up: Gentamicin    Assessment of levels:   Random level of 0.7 falls within the desired random gent level range of </= 2    Regimen Plan:   Gent 260 mg IV x 1 this afternoon with a random gent level ordered for 2/2 at 1000    Drug levels (last 3 results):  No results for input(s): AMIKACINPEAK, AMIKACINTROU, AMIKACINRAND, AMIKACIN in the last 72 hours.    No results for input(s): GENTAMICIN, GENTPEAK, GENTTROUGH, GENT10, GENT12, GENT8, GENTRANDOM in the last 72 hours.    No results for input(s): TOBRA8, TOBRA10, TOBRA12, TOBRARND, TOBRAMYCIN, TOBRAPEAK, TOBRATROUGH, TOBRAMYCINPE, TOBRAMYCINRA, TOBRAMYCINTR in the last 72 hours.    Pharmacy will continue to monitor.    Please contact pharmacy at extension [phone] with any questions regarding this assessment.    Patient brief summary:  Og Garcia is a 66 y.o. male initiated on aminoglycoside therapy for treatment of  pneumonia    Drug Allergies:   Review of patient's allergies indicates:  No Known Allergies    Actual Body Weight:   103 kg    Adjust Body Weight:   86.4 kg    Ideal Body Weight:  75.3 kg    Renal Function:   Estimated Creatinine Clearance: 27.6 mL/min (A) (based on SCr of 3.22 mg/dL (H)).,     Dialysis Method (if applicable):  intermittent HD    CBC (last 72 hours):  Recent Labs   Lab Result Units 01/29/22  1311 01/29/22  1508 01/30/22  0654 01/30/22  1524 01/31/22  1253   WBC K/uL 8.38 16.72* 22.82*  --  20.65*   Hemoglobin g/dL 7.2* 6.4* 6.7* 8.3* 6.9*   Hematocrit % 22.4* 19.7* 20.7* 24.4* 19.4*   Platelet Count K/uL 174 262 315  --  310   Lymphocytes % % 24.2* 13.9* 14.4*  --  12.6*   Lymphocytes, Man % % 21*  --  10*  --  11*   Monocytes % % 13.4* 9.0* 10.7*  --  11.2*   Monocytes, Man % % 12*  --  11*  --  6   Eosinophils % % 3.7 1.2 0.8*  --  0.6*   Eosinophils, Man % % 1  --   --   --   --    Basophils % % 0.2 0.4 0.7  --  0.5   Basophils, Man % %  --   --  1  --  1       Metabolic Panel (last 72 hours):  Recent Labs    Lab Result Units 01/29/22  1311 01/29/22  1507 01/31/22  0629   Sodium mmol/L 136 137 135*   Potassium mmol/L 5.2* 4.1 4.4   Chloride mmol/L 100 100 98   CO2 mmol/L 28 26 21   Glucose mg/dL 75 164* 221*   BUN mg/dL 24* 47* 73*   Creatinine mg/dL 3.86* 2.62* 3.22*   Albumin g/dL  --  1.0* 0.9*   Bilirubin, Total mg/dL  --  0.4 0.5   Alk Phos U/L  --  349* 337*   AST U/L  --  48* 65*   ALT U/L  --  35 44   Magnesium mg/dL  --   --  2.2       Aminoglycoside Administrations:  aminoglycosides given in last 96 hours                     gentamicin (GARAMYCIN) 260 mg in sodium chloride 0.9% 100 mL IVPB (mg) 260 mg New Bag 01/28/22 1633                    Microbiologic Results:  Microbiology Results (last 7 days)       ** No results found for the last 168 hours. **

## 2022-01-31 NOTE — SUBJECTIVE & OBJECTIVE
Interval History: No acute events overnight. Currently hemodynamically stable. Oxygenating adequately on the ventilator. Currently afebrile.    Objective:     Vital Signs (Most Recent):  Temp: 99 °F (37.2 °C) (01/31/22 0600)  Pulse: 97 (01/31/22 1203)  Resp: (!) 24 (01/31/22 1203)  BP: 128/70 (01/31/22 1203)  SpO2: 100 % (01/31/22 1203) Vital Signs (24h Range):  Temp:  [98.2 °F (36.8 °C)-99 °F (37.2 °C)] 99 °F (37.2 °C)  Pulse:  [] 97  Resp:  [11-62] 24  SpO2:  [90 %-100 %] 100 %  BP: (113-177)/(60-92) 128/70     Weight: 103 kg (227 lb 1.2 oz)  Body mass index is 31.67 kg/m².      Intake/Output Summary (Last 24 hours) at 1/31/2022 1220  Last data filed at 1/31/2022 1200  Gross per 24 hour   Intake 2001.67 ml   Output 2150 ml   Net -148.33 ml       Physical Exam  Vitals reviewed.   Constitutional:       General: He is not in acute distress.     Appearance: He is ill-appearing.   HENT:      Head: Normocephalic and atraumatic.      Right Ear: External ear normal.      Left Ear: External ear normal.      Nose: Nose normal.      Mouth/Throat:      Mouth: Mucous membranes are moist.      Comments: trach  Eyes:      Conjunctiva/sclera: Conjunctivae normal.      Pupils: Pupils are equal, round, and reactive to light.   Cardiovascular:      Rate and Rhythm: Regular rhythm. Tachycardia present.      Pulses: Normal pulses.      Heart sounds: Normal heart sounds.   Pulmonary:      Effort: No respiratory distress.      Comments: clear breath sounds anteriorly  Abdominal:      Palpations: Abdomen is soft.   Musculoskeletal:         General: Normal range of motion.      Cervical back: Neck supple.      Right lower leg: Edema present.      Left lower leg: Edema present.   Lymphadenopathy:      Cervical: No cervical adenopathy.   Skin:     General: Skin is warm and dry.   Neurological:      Cranial Nerves: No cranial nerve deficit.      Comments: Remains on mechanical ventilation. Will track at times         Vents:  Vent Mode:  A/C (01/31/22 0840)  Set Rate: 18 BPM (01/31/22 0840)  Vt Set: 480 mL (01/31/22 0840)  Pressure Support: 15 cmH20 (01/29/22 1945)  PEEP/CPAP: 5 cmH20 (01/31/22 0840)  Oxygen Concentration (%): 50 (01/31/22 0850)  Peak Airway Pressure: 35 cmH2O (01/31/22 0840)  Total Ve: 11 mL (01/31/22 0840)  F/VT Ratio<105 (RSBI): (!) 56.67 (01/30/22 1600)    Lines/Drains/Airways     Peripherally Inserted Central Catheter Line            PICC Double Lumen 01/05/22 left basilic 26 days          Central Venous Catheter Line                 Hemodialysis Catheter 12/30/21 0900 right internal jugular 32 days          Drain                 NG/OG Tube [place] sump 18 Fr. Left nostril -- days         Colostomy 12/18/21 1030 Descending/sigmoid LUQ 44 days          Airway                 Surgical Airway 01/04/22 Shiley 27 days                Significant Labs:    CBC/Anemia Profile:  Recent Labs   Lab 01/29/22  1311 01/29/22  1311 01/29/22  1508 01/30/22  0654 01/30/22  1524   WBC 8.38  --  16.72* 22.82*  --    HGB 7.2*   < > 6.4* 6.7* 8.3*   HCT 22.4*   < > 19.7* 20.7* 24.4*     --  262 315  --    MCV 76.7*  --  91.6 90.8  --    RDW 16.7*  --  14.6* 14.7*  --     < > = values in this interval not displayed.        Chemistries:  Recent Labs   Lab 01/29/22  1311 01/29/22  1507 01/31/22  0629    137 135*   K 5.2* 4.1 4.4    100 98   CO2 28 26 21   BUN 24* 47* 73*   CREATININE 3.86* 2.62* 3.22*   CALCIUM 6.2* 8.2* 8.0*   ALBUMIN  --  1.0* 0.9*   PROT  --  7.0 7.1   BILITOT  --  0.4 0.5   ALKPHOS  --  349* 337*   ALT  --  35 44   AST  --  48* 65*   MG  --   --  2.2       All pertinent labs within the past 24 hours have been reviewed.    Significant Imaging:  I have reviewed all pertinent imaging results/findings within the past 24 hours.

## 2022-01-31 NOTE — PROGRESS NOTES
[facility]  Nephrology  Progress Note    Patient Name: Og Garcia  MRN: [id number]  Admission Date: 12/23/2021  Hospital Length of Stay: 39 days  Attending Provider: Cecil Abernathy DO   Primary Care Physician: Hector Arndt DNP, FNP-C  Principal Problem:Denice gangrene    Subjective:     HPI: The patient is known from the previous nephrology consult at [facility].  He is no at Specialty and continues to need dialysis support.      Interval History: The patient's condition is about the same.  He remains ventilated.  Continuing with dialysis support.    Review of patient's allergies indicates:  No Known Allergies  Current Facility-Administered Medications   Medication Frequency    0.9%  NaCl infusion (for blood administration) Q24H PRN    0.9%  NaCl infusion PRN    0.9%  NaCl infusion PRN    acetaminophen tablet 1,000 mg Q6H PRN    acetaminophen tablet 500 mg Q6H PRN    albuterol nebulizer solution 2.5 mg Q4H PRN    amphotericin B liposome (AMBISOME) 450 mg in dextrose 5 % 450 mL IVPB Q24H    bisacodyL EC tablet 10 mg Daily PRN    calcium gluconate 1 g in dextrose 5 % in water (D5W) 5 % 100 mL IVPB (MB+) Once    dextromethorphan-guaiFENesin  mg/5 ml liquid 10 mL Q6H PRN    dextrose 50% injection 12.5 g PRN    diltiaZEM tablet 30 mg Q6H    fentaNYL 50 mcg/hr 1 patch Q72H    gentamicin (GARAMYCIN) 260 mg in sodium chloride 0.9% 100 mL IVPB Once    gentamicin - pharmacy to dose pharmacy to manage frequency    glucagon (human recombinant) injection 1 mg PRN    heparin (porcine) injection 4,000 Units PRN    heparin (porcine) injection 5,000 Units Q8H    insulin aspart U-100 injection 1-10 Units Q6H    metoprolol injection 5 mg Q5 Min PRN    mupirocin 2 % ointment BID    NORepinephrine 32 mg in dextrose 5 % 250 mL infusion PRN    ondansetron injection 8 mg Q6H PRN    pantoprazole suspension 40 mg Daily    piperacillin-tazobactam (ZOSYN) 4.5 g in dextrose 5 % in  water (D5W) 5 % 100 mL IVPB (MB+) Q12H    sevelamer carbonate pwpk 0.8 g TID WM    simethicone chewable tablet 80 mg TID PRN    ticagrelor tablet 90 mg BID    traZODone tablet 50 mg Nightly PRN       Objective:     Vital Signs (Most Recent):  Temp: 99 °F (37.2 °C) (01/31/22 0600)  Pulse: 97 (01/31/22 1203)  Resp: (!) 24 (01/31/22 1203)  BP: 128/70 (01/31/22 1203)  SpO2: 100 % (01/31/22 1203)  O2 Device (Oxygen Therapy): ventilator (01/31/22 1220) Vital Signs (24h Range):  Temp:  [98.2 °F (36.8 °C)-99 °F (37.2 °C)] 99 °F (37.2 °C)  Pulse:  [] 97  Resp:  [11-44] 24  SpO2:  [90 %-100 %] 100 %  BP: (113-177)/(60-92) 128/70     Weight: 103 kg (227 lb 1.2 oz) (01/31/22 0600)  Body mass index is 31.67 kg/m².  Body surface area is 2.27 meters squared.    I/O last 3 completed shifts:  In: 3601.7 [Blood:301.7; NG/GT:2100; IV Piggyback:1200]  Out: 750 [Stool:750]    Physical Exam  Vitals reviewed.   Cardiovascular:      Rate and Rhythm: Regular rhythm.      Heart sounds: Normal heart sounds.   Pulmonary:      Breath sounds: Normal breath sounds.   Abdominal:      Palpations: Abdomen is soft.   Neurological:      Mental Status: He is alert.         Significant Labs:  BMP:   Recent Labs   Lab 01/31/22  0629   *   *   K 4.4   CL 98   CO2 21   BUN 73*   CREATININE 3.22*   CALCIUM 8.0*   MG 2.2     CBC:   Recent Labs   Lab 01/31/22  1253   WBC 20.65*   RBC 2.26*   HGB 6.9*   HCT 19.4*      MCV 85.8   MCH 30.5   MCHC 35.6        Significant Imaging:  Labs: Reviewed    Assessment/Plan:     * Denice gangrene  Continue wound care      RAHAT (acute kidney injury)  Dialyzed yesterday  1/31/2022 Continue with scheduled hemodialysis.    On mechanically assisted ventilation  Wean as tolerated  Per ICU team with ventilator management        Thank you for your consult. I will follow-up with patient. Please contact us if you have any additional questions.    Edwin Pryor Jr, MD  Nephrology  [facility]

## 2022-01-31 NOTE — PROGRESS NOTES
[facility]  Pulmonology  Progress Note    Patient Name: Og Garcia  MRN: [id number]  Admission Date: 12/23/2021  Hospital Length of Stay: 39 days  Code Status: Prior  Attending Provider: Cecil Abernathy DO  Primary Care Provider: Hector Arndt DNP, FNP-C   Principal Problem: Denice gangrene    Subjective:     Interval History: No acute events overnight. Currently hemodynamically stable. Oxygenating adequately on the ventilator. Currently afebrile.    Objective:     Vital Signs (Most Recent):  Temp: 99 °F (37.2 °C) (01/31/22 0600)  Pulse: 97 (01/31/22 1203)  Resp: (!) 24 (01/31/22 1203)  BP: 128/70 (01/31/22 1203)  SpO2: 100 % (01/31/22 1203) Vital Signs (24h Range):  Temp:  [98.2 °F (36.8 °C)-99 °F (37.2 °C)] 99 °F (37.2 °C)  Pulse:  [] 97  Resp:  [11-62] 24  SpO2:  [90 %-100 %] 100 %  BP: (113-177)/(60-92) 128/70     Weight: 103 kg (227 lb 1.2 oz)  Body mass index is 31.67 kg/m².      Intake/Output Summary (Last 24 hours) at 1/31/2022 1220  Last data filed at 1/31/2022 1200  Gross per 24 hour   Intake 2001.67 ml   Output 2150 ml   Net -148.33 ml       Physical Exam  Vitals reviewed.   Constitutional:       General: He is not in acute distress.     Appearance: He is ill-appearing.   HENT:      Head: Normocephalic and atraumatic.      Right Ear: External ear normal.      Left Ear: External ear normal.      Nose: Nose normal.      Mouth/Throat:      Mouth: Mucous membranes are moist.      Comments: trach  Eyes:      Conjunctiva/sclera: Conjunctivae normal.      Pupils: Pupils are equal, round, and reactive to light.   Cardiovascular:      Rate and Rhythm: Regular rhythm. Tachycardia present.      Pulses: Normal pulses.      Heart sounds: Normal heart sounds.   Pulmonary:      Effort: No respiratory distress.      Comments: clear breath sounds anteriorly  Abdominal:      Palpations: Abdomen is soft.   Musculoskeletal:         General: Normal range of motion.      Cervical  back: Neck supple.      Right lower leg: Edema present.      Left lower leg: Edema present.   Lymphadenopathy:      Cervical: No cervical adenopathy.   Skin:     General: Skin is warm and dry.   Neurological:      Cranial Nerves: No cranial nerve deficit.      Comments: Remains on mechanical ventilation. Will track at times         Vents:  Vent Mode: A/C (01/31/22 0840)  Set Rate: 18 BPM (01/31/22 0840)  Vt Set: 480 mL (01/31/22 0840)  Pressure Support: 15 cmH20 (01/29/22 1945)  PEEP/CPAP: 5 cmH20 (01/31/22 0840)  Oxygen Concentration (%): 50 (01/31/22 0850)  Peak Airway Pressure: 35 cmH2O (01/31/22 0840)  Total Ve: 11 mL (01/31/22 0840)  F/VT Ratio<105 (RSBI): (!) 56.67 (01/30/22 1600)    Lines/Drains/Airways     Peripherally Inserted Central Catheter Line            PICC Double Lumen 01/05/22 left basilic 26 days          Central Venous Catheter Line                 Hemodialysis Catheter 12/30/21 0900 right internal jugular 32 days          Drain                 NG/OG Tube [place] sump 18 Fr. Left nostril -- days         Colostomy 12/18/21 1030 Descending/sigmoid LUQ 44 days          Airway                 Surgical Airway 01/04/22 Shiley 27 days                Significant Labs:    CBC/Anemia Profile:  Recent Labs   Lab 01/29/22  1311 01/29/22  1311 01/29/22  1508 01/30/22  0654 01/30/22  1524   WBC 8.38  --  16.72* 22.82*  --    HGB 7.2*   < > 6.4* 6.7* 8.3*   HCT 22.4*   < > 19.7* 20.7* 24.4*     --  262 315  --    MCV 76.7*  --  91.6 90.8  --    RDW 16.7*  --  14.6* 14.7*  --     < > = values in this interval not displayed.        Chemistries:  Recent Labs   Lab 01/29/22  1311 01/29/22  1507 01/31/22  0629    137 135*   K 5.2* 4.1 4.4    100 98   CO2 28 26 21   BUN 24* 47* 73*   CREATININE 3.86* 2.62* 3.22*   CALCIUM 6.2* 8.2* 8.0*   ALBUMIN  --  1.0* 0.9*   PROT  --  7.0 7.1   BILITOT  --  0.4 0.5   ALKPHOS  --  349* 337*   ALT  --  35 44   AST  --  48* 65*   MG  --   --  2.2       All  pertinent labs within the past 24 hours have been reviewed.    Significant Imaging:  I have reviewed all pertinent imaging results/findings within the past 24 hours.    Assessment/Plan:     * Denice gangrene  - s/p multiple debridements  - wound vac in place  - ID consulted for antibiotic management: He was treated with rocephin, daptomycin, clindamycin. 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 Amphotericin started on 1/7. The patient went to OR on 1/10 for debridement. Surgery note reviewed and appreciated.  1/12- back to OR today.  1/13- OR note reviewed. No purulence noted.    1/27- wound vac in place     Disseminated mucormycosis  Defer to Dr. Hall on all antibiotic choices---Her note has been reviewed and is appreciated.     Amphotericin until 02/03   zosyn and gent for pseudomonal double coverage until 02/01      Type 2 diabetes mellitus without complication, without long-term current use of insulin  - continue basal/bolus insulin  -accuchecks are reasonable currently      Acute blood loss anemia  - H/H down to 5.5/16 - pt had bleeding from his HD insertion site yesterday this has now resolved.   - will transfuse 2 units PRBCs on HD  1/2- 9.7/27.5 - repeat CBC in AM  1/4- Hgb is 7.2. Not sure if he will be transfused during surgery. IF not we will try to get  Him transfused with next HD  01/06/2021 transfuse 1 unit of packed red blood cells today  01/10/2021 needs a unit of blood  1/11- repeat H&H in am. Continue to transfuse as needed.  1/13- Hgb is 5. Transfusing today. Post transfusion  H&H  1/14- CBC pending for today  1/15- 6/18 - hemodynamically stable, recheck in AM   1/16-- 6.1/17.1 - critical low blood shortage in hospital - holding transfusion until next HD since he is hemodynamically stable and not actively bleeding   01/17/2021 day transfuse blood on dialysis today  01/18/2020 to hemoglobin up to 9 after transfusion  01/25/2022 no signs of bleeding most recent hemoglobin 8  01/29/2022 patient will need transfusion today  1/31- Hgb 6.7 yesterday. Can transfuse with next HD session    RAHAT (acute kidney injury)  - tunneled HD line placement 12/30    HD MWF            On mechanically assisted ventilation  - 12/14 intubated, tracheostomy 1/4  Weaning waxes and wanes     1/27- continue to increase as tolerated. Currently doing 4 hours SIMV with reduced rate of 4. - will transition his fentanyl infusion to a patch of 50mcg today    Continue weaning from the vent  1/31- plan for 5 hours tid today. Continue to increase as tolerated    Hypertension  Currently hypotensive. Holding any antihypertensives  BP looks a little better this am. Weaning pressor as tolerated  1/21- No longer on pressor.   1/31- hemodynamically stable                 Cecil Abernathy, DO  Pulmonology  [facility]

## 2022-01-31 NOTE — PLAN OF CARE
Problem: Adult Inpatient Plan of Care  Goal: Plan of Care Review  Outcome: Ongoing, Progressing     Problem: Bleeding (Sepsis/Septic Shock)  Goal: Absence of Bleeding  Outcome: Ongoing, Progressing     Problem: Nutrition Impaired (Sepsis/Septic Shock)  Goal: Optimal Nutrition Intake  Outcome: Ongoing, Progressing     Problem: Renal Function Impairment (Acute Kidney Injury/Impairment)  Goal: Effective Renal Function  Outcome: Ongoing, Progressing     Problem: Communication Impairment (Mechanical Ventilation, Invasive)  Goal: Effective Communication  Outcome: Ongoing, Progressing     Problem: Skin and Tissue Injury (Mechanical Ventilation, Invasive)  Goal: Absence of Device-Related Skin and Tissue Injury  Outcome: Ongoing, Progressing

## 2022-01-31 NOTE — SUBJECTIVE & OBJECTIVE
Interval History:  He remains on the ventilator.    Review of patient's allergies indicates:  No Known Allergies  Current Facility-Administered Medications   Medication Frequency    0.9%  NaCl infusion (for blood administration) Q24H PRN    0.9%  NaCl infusion (for blood administration) Q24H PRN    0.9%  NaCl infusion PRN    0.9%  NaCl infusion PRN    acetaminophen tablet 1,000 mg Q6H PRN    acetaminophen tablet 500 mg Q6H PRN    albuterol nebulizer solution 2.5 mg Q4H PRN    amphotericin B liposome (AMBISOME) 450 mg in dextrose 5 % 450 mL IVPB Q24H    bisacodyL EC tablet 10 mg Daily PRN    calcium gluconate 1 g in dextrose 5 % in water (D5W) 5 % 100 mL IVPB (MB+) Once    dextromethorphan-guaiFENesin  mg/5 ml liquid 10 mL Q6H PRN    dextrose 50% injection 12.5 g PRN    diltiaZEM tablet 30 mg Q6H    fentaNYL 50 mcg/hr 1 patch Q72H    gentamicin - pharmacy to dose pharmacy to manage frequency    glucagon (human recombinant) injection 1 mg PRN    heparin (porcine) injection 4,000 Units PRN    heparin (porcine) injection 5,000 Units Q8H    insulin aspart U-100 injection 1-10 Units Q6H    metoprolol injection 5 mg Q5 Min PRN    NORepinephrine 32 mg in dextrose 5 % 250 mL infusion PRN    ondansetron injection 8 mg Q6H PRN    pantoprazole suspension 40 mg Daily    piperacillin-tazobactam (ZOSYN) 4.5 g in dextrose 5 % in water (D5W) 5 % 100 mL IVPB (MB+) Q12H    sevelamer carbonate pwpk 0.8 g TID WM    simethicone chewable tablet 80 mg TID PRN    ticagrelor tablet 90 mg BID    traZODone tablet 50 mg Nightly PRN       Objective:     Vital Signs (Most Recent):  Temp: 98.9 °F (37.2 °C) (01/30/22 1905)  Pulse: 92 (01/30/22 2000)  Resp: (!) 28 (01/30/22 2000)  BP: (!) 146/72 (01/30/22 2000)  SpO2: 97 % (01/30/22 2000)  O2 Device (Oxygen Therapy): ventilator (01/30/22 1600) Vital Signs (24h Range):  Temp:  [98.2 °F (36.8 °C)-99.7 °F (37.6 °C)] 98.9 °F (37.2 °C)  Pulse:  [] 92  Resp:   [11-62] 28  SpO2:  [86 %-100 %] 97 %  BP: (131-177)/(71-93) 146/72     Weight: 104.1 kg (229 lb 8 oz) (01/29/22 0058)  Body mass index is 32.01 kg/m².  Body surface area is 2.28 meters squared.    I/O last 3 completed shifts:  In: 1901.7 [Blood:301.7; NG/GT:1050; IV Piggyback:550]  Out: 450 [Stool:450]    Physical Exam  Vitals reviewed.   Constitutional:       General: He is not in acute distress.     Appearance: He is ill-appearing.   HENT:      Head: Normocephalic and atraumatic.      Mouth/Throat:      Comments: trach  Eyes:      Pupils: Pupils are equal, round, and reactive to light.   Cardiovascular:      Rate and Rhythm: Regular rhythm. Tachycardia present.      Heart sounds: Normal heart sounds.   Pulmonary:      Effort: Pulmonary effort is normal. No respiratory distress.      Comments: Ventilated, resting comfortably.  Abdominal:      General: Bowel sounds are normal.      Palpations: Abdomen is soft.   Musculoskeletal:         General: Normal range of motion.      Cervical back: Neck supple.      Right lower leg: Edema present.      Left lower leg: Edema present.   Lymphadenopathy:      Cervical: No cervical adenopathy.   Skin:     General: Skin is warm and dry.   Neurological:      Cranial Nerves: No cranial nerve deficit.         Significant Labs:  BMP:   Recent Labs   Lab 01/29/22  1507   *      K 4.1      CO2 26   BUN 47*   CREATININE 2.62*   CALCIUM 8.2*        Significant Imaging:

## 2022-01-31 NOTE — PROGRESS NOTES
[facility]  Nephrology  Progress Note    Patient Name: Og Garcia  MRN: [id number]  Admission Date: 12/23/2021  Hospital Length of Stay: 38 days  Attending Provider: Cecil Abernathy DO   Primary Care Physician: Hector Arndt DNP, FNP-C  Principal Problem:Denice gangrene    Subjective:     HPI: The patient is known from the previous nephrology consult at [facility].  He is no at Specialty and continues to need dialysis support.      Interval History:  He remains on the ventilator.    Review of patient's allergies indicates:  No Known Allergies  Current Facility-Administered Medications   Medication Frequency    0.9%  NaCl infusion (for blood administration) Q24H PRN    0.9%  NaCl infusion (for blood administration) Q24H PRN    0.9%  NaCl infusion PRN    0.9%  NaCl infusion PRN    acetaminophen tablet 1,000 mg Q6H PRN    acetaminophen tablet 500 mg Q6H PRN    albuterol nebulizer solution 2.5 mg Q4H PRN    amphotericin B liposome (AMBISOME) 450 mg in dextrose 5 % 450 mL IVPB Q24H    bisacodyL EC tablet 10 mg Daily PRN    calcium gluconate 1 g in dextrose 5 % in water (D5W) 5 % 100 mL IVPB (MB+) Once    dextromethorphan-guaiFENesin  mg/5 ml liquid 10 mL Q6H PRN    dextrose 50% injection 12.5 g PRN    diltiaZEM tablet 30 mg Q6H    fentaNYL 50 mcg/hr 1 patch Q72H    gentamicin - pharmacy to dose pharmacy to manage frequency    glucagon (human recombinant) injection 1 mg PRN    heparin (porcine) injection 4,000 Units PRN    heparin (porcine) injection 5,000 Units Q8H    insulin aspart U-100 injection 1-10 Units Q6H    metoprolol injection 5 mg Q5 Min PRN    NORepinephrine 32 mg in dextrose 5 % 250 mL infusion PRN    ondansetron injection 8 mg Q6H PRN    pantoprazole suspension 40 mg Daily    piperacillin-tazobactam (ZOSYN) 4.5 g in dextrose 5 % in water (D5W) 5 % 100 mL IVPB (MB+) Q12H    sevelamer carbonate pwpk 0.8 g TID WM    simethicone chewable tablet 80  mg TID PRN    ticagrelor tablet 90 mg BID    traZODone tablet 50 mg Nightly PRN       Objective:     Vital Signs (Most Recent):  Temp: 98.9 °F (37.2 °C) (01/30/22 1905)  Pulse: 92 (01/30/22 2000)  Resp: (!) 28 (01/30/22 2000)  BP: (!) 146/72 (01/30/22 2000)  SpO2: 97 % (01/30/22 2000)  O2 Device (Oxygen Therapy): ventilator (01/30/22 1600) Vital Signs (24h Range):  Temp:  [98.2 °F (36.8 °C)-99.7 °F (37.6 °C)] 98.9 °F (37.2 °C)  Pulse:  [] 92  Resp:  [11-62] 28  SpO2:  [86 %-100 %] 97 %  BP: (131-177)/(71-93) 146/72     Weight: 104.1 kg (229 lb 8 oz) (01/29/22 0058)  Body mass index is 32.01 kg/m².  Body surface area is 2.28 meters squared.    I/O last 3 completed shifts:  In: 1901.7 [Blood:301.7; NG/GT:1050; IV Piggyback:550]  Out: 450 [Stool:450]    Physical Exam  Vitals reviewed.   Constitutional:       General: He is not in acute distress.     Appearance: He is ill-appearing.   HENT:      Head: Normocephalic and atraumatic.      Mouth/Throat:      Comments: trach  Eyes:      Pupils: Pupils are equal, round, and reactive to light.   Cardiovascular:      Rate and Rhythm: Regular rhythm. Tachycardia present.      Heart sounds: Normal heart sounds.   Pulmonary:      Effort: Pulmonary effort is normal. No respiratory distress.      Comments: Ventilated, resting comfortably.  Abdominal:      General: Bowel sounds are normal.      Palpations: Abdomen is soft.   Musculoskeletal:         General: Normal range of motion.      Cervical back: Neck supple.      Right lower leg: Edema present.      Left lower leg: Edema present.   Lymphadenopathy:      Cervical: No cervical adenopathy.   Skin:     General: Skin is warm and dry.   Neurological:      Cranial Nerves: No cranial nerve deficit.         Significant Labs:  BMP:   Recent Labs   Lab 01/29/22  1507   *      K 4.1      CO2 26   BUN 47*   CREATININE 2.62*   CALCIUM 8.2*        Significant Imaging:      Assessment/Plan:     Gretchen Galdamez  gangrene  Continue wound care      RAHAT (acute kidney injury)  Dialyzed yesterday    On mechanically assisted ventilation  Wean as tolerated        Thank you for your consult.     Huber Mcnamara MD  Nephrology  [facility]

## 2022-01-31 NOTE — ASSESSMENT & PLAN NOTE
- 12/14 intubated, tracheostomy 1/4  Weaning waxes and wanes     1/27- continue to increase as tolerated. Currently doing 4 hours SIMV with reduced rate of 4. - will transition his fentanyl infusion to a patch of 50mcg today    Continue weaning from the vent  1/31- plan for 5 hours tid today. Continue to increase as tolerated

## 2022-02-01 NOTE — ASSESSMENT & PLAN NOTE
- s/p multiple debridements  - wound vac in place  - ID consulted for antibiotic management: He was treated with rocephin, daptomycin, clindamycin. 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 Amphotericin started on 1/7. The patient went to OR on 1/10 for debridement. Surgery note reviewed and appreciated.  1/12- back to OR today.  1/13- OR note reviewed. No purulence noted.    1/27- wound vac in place   2/1- seems to be improving

## 2022-02-01 NOTE — SUBJECTIVE & OBJECTIVE
Interval History: No acute events overnight. Currently hemodynamically stable. Oxygenating adequately on the ventilator. Currently afebrile.    Objective:     Vital Signs (Most Recent):  Temp: 99.1 °F (37.3 °C) (02/01/22 0800)  Pulse: 97 (02/01/22 0800)  Resp: 13 (02/01/22 0800)  BP: 135/74 (02/01/22 0800)  SpO2: 100 % (02/01/22 0800) Vital Signs (24h Range):  Temp:  [98.8 °F (37.1 °C)-99.9 °F (37.7 °C)] 99.1 °F (37.3 °C)  Pulse:  [] 97  Resp:  [13-36] 13  SpO2:  [98 %-100 %] 100 %  BP: ()/(53-82) 135/74     Weight: 103 kg (227 lb 1.2 oz)  Body mass index is 31.67 kg/m².    No intake or output data in the 24 hours ending 02/01/22 1243    Physical Exam  Vitals reviewed.   Constitutional:       General: He is not in acute distress.     Appearance: He is ill-appearing.      Comments: Chronically ill appearing   HENT:      Head: Normocephalic and atraumatic.      Right Ear: External ear normal.      Left Ear: External ear normal.      Nose: Nose normal.      Mouth/Throat:      Mouth: Mucous membranes are moist.      Comments: trach  Eyes:      Conjunctiva/sclera: Conjunctivae normal.      Pupils: Pupils are equal, round, and reactive to light.   Cardiovascular:      Rate and Rhythm: Regular rhythm. Tachycardia present.      Pulses: Normal pulses.      Heart sounds: Normal heart sounds.   Pulmonary:      Effort: No respiratory distress.      Comments: clear breath sounds anteriorly  Abdominal:      Palpations: Abdomen is soft.   Musculoskeletal:         General: Normal range of motion.      Cervical back: Neck supple.      Right lower leg: Edema present.      Left lower leg: Edema present.   Lymphadenopathy:      Cervical: No cervical adenopathy.   Skin:     General: Skin is warm and dry.   Neurological:      Cranial Nerves: No cranial nerve deficit.      Comments: Remains on mechanical ventilation. Will track at times         Vents:  Vent Mode: A/C (02/01/22 0905)  Set Rate: 16 BPM (02/01/22 0905)  Vt Set:  480 mL (02/01/22 0905)  Pressure Support: 15 cmH20 (02/01/22 0400)  PEEP/CPAP: 5 cmH20 (02/01/22 0905)  Oxygen Concentration (%): 35 (02/01/22 0905)  Peak Airway Pressure: 20 cmH2O (02/01/22 0905)  Total Ve: 5.4 mL (02/01/22 0905)  F/VT Ratio<105 (RSBI): (!) 81.97 (01/31/22 2000)    Lines/Drains/Airways     Peripherally Inserted Central Catheter Line            PICC Double Lumen 01/05/22 left basilic 27 days          Central Venous Catheter Line                 Hemodialysis Catheter 12/30/21 0900 right internal jugular 33 days          Drain                 NG/OG Tube [place] sump 18 Fr. Left nostril -- days         Colostomy 12/18/21 1030 Descending/sigmoid LUQ 45 days          Airway                 Surgical Airway 01/04/22 Shiley 28 days                Significant Labs:    CBC/Anemia Profile:  Recent Labs   Lab 01/30/22  1524 01/31/22  1253   WBC  --  20.65*   HGB 8.3* 6.9*   HCT 24.4* 19.4*   PLT  --  310   MCV  --  85.8   RDW  --  14.6*        Chemistries:  Recent Labs   Lab 01/31/22  0629   *   K 4.4   CL 98   CO2 21   BUN 73*   CREATININE 3.22*   CALCIUM 8.0*   ALBUMIN 0.9*   PROT 7.1   BILITOT 0.5   ALKPHOS 337*   ALT 44   AST 65*   MG 2.2       All pertinent labs within the past 24 hours have been reviewed.    Significant Imaging:  I have reviewed all pertinent imaging results/findings within the past 24 hours.

## 2022-02-01 NOTE — ASSESSMENT & PLAN NOTE
- 12/14 intubated, tracheostomy 1/4  Weaning waxes and wanes     1/27- continue to increase as tolerated. Currently doing 4 hours SIMV with reduced rate of 4. - will transition his fentanyl infusion to a patch of 50mcg today    Continue weaning from the vent  1/31- plan for 5 hours tid today. Continue to increase as tolerated  2/1- He was not able to complete trial last night. We will continue with current plan and try again today

## 2022-02-01 NOTE — PROGRESS NOTES
Progress Note                                                           Infectious disease   Admit Date: 12/23/2021    SUBJECTIVE:     Follow-up For:  Denice gangrene    HPI/Interval history:  Patient doing similar to yesterday.  No fever, wounds improving in appearance.  Chest endotracheal tube secretions overall.      Review of Systems:    Unable to obtain as he is on the vent.      OBJECTIVE:     Vital Signs Range (Last 24H):  Temp:  [98.8 °F (37.1 °C)-99.9 °F (37.7 °C)]   Pulse:  []   Resp:  [13-36]   BP: ()/(53-82)   SpO2:  [98 %-100 %]     Physical Exam   Constitutional: Pt is arousable but I did not get him to follow commands  Head: Normocephalic, scarring about right orbit from old injury.   Eyes, nose and throat:  Pale moist mucosa, anicteric and acyanotic, enucleated right eye, unable to examine mouth as not able to get it open  Neck: Neck with tracheostomy, less mucoid drainage around the tubing  Cardiovascular: Normal rate and regular rhythm.  S1 and S2 appreciated by ascultation, no murmurs  Pulmonary/Chest: Effort normal, no crepitations or wheezes auscultated   Abdomen:  Wound VAC to large midline wound, colostomy present, not distended, normal bowel sounds.  Wounds in groin/buttock regions also better.  Extremities:  Generalized body edema present.   Skin:  Blister to dorsum of right hand starting to dry a little    Laboratory:  CBC:   Recent Labs   Lab 01/31/22  1253   WBC 20.65*   RBC 2.26*   HGB 6.9*   HCT 19.4*      MCV 85.8   MCH 30.5   MCHC 35.6     BMP:   Recent Labs   Lab 01/31/22  0629   *   *   K 4.4   CL 98   CO2 21   BUN 73*   CREATININE 3.22*   CALCIUM 8.0*   MG 2.2     CMP:   Recent Labs   Lab 01/31/22  0629   *   CALCIUM 8.0*   ALBUMIN 0.9*   PROT 7.1   *   K 4.4   CO2 21   CL 98   BUN 73*   CREATININE 3.22*   ALKPHOS 337*   ALT 44   AST 65*   BILITOT 0.5     Microbiology Results (last 7 days)     ** No results found for the last 168  hours. 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 Necrotizing fasciitis involving perineum and extending up into pelvis and abdomen admitted originally in mid December.  Infection is polymicrobial including several bacteria along with mucor spp.  His wounds are improving in appearance.  2. Multi organ failure due to above, on mechanical ventilation, requiring hemodialysis  3. Vent associated pneumonia caused by Pseudomonas; improving clinically.   4.  persisting leukocytosis  5. History of diabetes        PLAN: Continue current antimicrobials.  Monitor leukocytosis.  Continue antibiotic therapy for the Pseudomonas pneumonia for another day.  Continuing with amphotericin B for now.

## 2022-02-01 NOTE — PROGRESS NOTES
[facility]  Pulmonology  Progress Note    Patient Name: Og Garcia  MRN: [id number]  Admission Date: 12/23/2021  Hospital Length of Stay: 40 days  Code Status: Prior  Attending Provider: Cecil Abernathy DO  Primary Care Provider: Hector Arndt DNP, FNP-C   Principal Problem: Denice gangrene    Subjective:     Interval History: No acute events overnight. Currently hemodynamically stable. Oxygenating adequately on the ventilator. Currently afebrile.    Objective:     Vital Signs (Most Recent):  Temp: 99.1 °F (37.3 °C) (02/01/22 0800)  Pulse: 97 (02/01/22 0800)  Resp: 13 (02/01/22 0800)  BP: 135/74 (02/01/22 0800)  SpO2: 100 % (02/01/22 0800) Vital Signs (24h Range):  Temp:  [98.8 °F (37.1 °C)-99.9 °F (37.7 °C)] 99.1 °F (37.3 °C)  Pulse:  [] 97  Resp:  [13-36] 13  SpO2:  [98 %-100 %] 100 %  BP: ()/(53-82) 135/74     Weight: 103 kg (227 lb 1.2 oz)  Body mass index is 31.67 kg/m².    No intake or output data in the 24 hours ending 02/01/22 1243    Physical Exam  Vitals reviewed.   Constitutional:       General: He is not in acute distress.     Appearance: He is ill-appearing.      Comments: Chronically ill appearing   HENT:      Head: Normocephalic and atraumatic.      Right Ear: External ear normal.      Left Ear: External ear normal.      Nose: Nose normal.      Mouth/Throat:      Mouth: Mucous membranes are moist.      Comments: trach  Eyes:      Conjunctiva/sclera: Conjunctivae normal.      Pupils: Pupils are equal, round, and reactive to light.   Cardiovascular:      Rate and Rhythm: Regular rhythm. Tachycardia present.      Pulses: Normal pulses.      Heart sounds: Normal heart sounds.   Pulmonary:      Effort: No respiratory distress.      Comments: clear breath sounds anteriorly  Abdominal:      Palpations: Abdomen is soft.   Musculoskeletal:         General: Normal range of motion.      Cervical back: Neck supple.      Right lower leg: Edema present.      Left  Shift assessment complete. Pt sitting in recliner talking on phone during assessment. IV, dressing dry and intact. IV currently infusing. Able to dorsi/plantar flex. He reports mild pain 2/10 at this time. Will medicate per orders. Call light within reach. IS at bedside. Pt to call for any needs/concerns. lower leg: Edema present.   Lymphadenopathy:      Cervical: No cervical adenopathy.   Skin:     General: Skin is warm and dry.   Neurological:      Cranial Nerves: No cranial nerve deficit.      Comments: Remains on mechanical ventilation. Will track at times         Vents:  Vent Mode: A/C (02/01/22 0905)  Set Rate: 16 BPM (02/01/22 0905)  Vt Set: 480 mL (02/01/22 0905)  Pressure Support: 15 cmH20 (02/01/22 0400)  PEEP/CPAP: 5 cmH20 (02/01/22 0905)  Oxygen Concentration (%): 35 (02/01/22 0905)  Peak Airway Pressure: 20 cmH2O (02/01/22 0905)  Total Ve: 5.4 mL (02/01/22 0905)  F/VT Ratio<105 (RSBI): (!) 81.97 (01/31/22 2000)    Lines/Drains/Airways     Peripherally Inserted Central Catheter Line            PICC Double Lumen 01/05/22 left basilic 27 days          Central Venous Catheter Line                 Hemodialysis Catheter 12/30/21 0900 right internal jugular 33 days          Drain                 NG/OG Tube [place] sump 18 Fr. Left nostril -- days         Colostomy 12/18/21 1030 Descending/sigmoid LUQ 45 days          Airway                 Surgical Airway 01/04/22 Shiley 28 days                Significant Labs:    CBC/Anemia Profile:  Recent Labs   Lab 01/30/22  1524 01/31/22  1253   WBC  --  20.65*   HGB 8.3* 6.9*   HCT 24.4* 19.4*   PLT  --  310   MCV  --  85.8   RDW  --  14.6*        Chemistries:  Recent Labs   Lab 01/31/22  0629   *   K 4.4   CL 98   CO2 21   BUN 73*   CREATININE 3.22*   CALCIUM 8.0*   ALBUMIN 0.9*   PROT 7.1   BILITOT 0.5   ALKPHOS 337*   ALT 44   AST 65*   MG 2.2       All pertinent labs within the past 24 hours have been reviewed.    Significant Imaging:  I have reviewed all pertinent imaging results/findings within the past 24 hours.    Assessment/Plan:     * Denice gangrene  - s/p multiple debridements  - wound vac in place  - ID consulted for antibiotic management: He was treated with rocephin, daptomycin, clindamycin. 12/21- change clindamycin to ampicillin and treat for  another week  - remains on ampicillin, this was changed to merrem 1/9  - pathology with fungal species consistent with mucormycosis initiated on amphotericin- mucormycosis with up to 50% mortality rate  - plan for OR tomorrow  Patient receiving amphotericin having spells of bradycardia will repeat blood cultures and do a echo to look for endocarditis  1/11- echo reviewed and no obvious vegetations. Amphotericin started on 1/7. The patient went to OR on 1/10 for debridement. Surgery note reviewed and appreciated.  1/12- back to OR today.  1/13- OR note reviewed. No purulence noted.    1/27- wound vac in place   2/1- seems to be improving    Disseminated mucormycosis  Defer to Dr. Hall on all antibiotic choices---Her note has been reviewed and is appreciated.     Amphotericin until 02/03   zosyn and gent for pseudomonal double coverage until 02/01      Type 2 diabetes mellitus without complication, without long-term current use of insulin  - continue basal/bolus insulin  -accuchecks are reasonable currently      Acute blood loss anemia  - H/H down to 5.5/16 - pt had bleeding from his HD insertion site yesterday this has now resolved.   - will transfuse 2 units PRBCs on HD  1/2- 9.7/27.5 - repeat CBC in AM  1/4- Hgb is 7.2. Not sure if he will be transfused during surgery. IF not we will try to get  Him transfused with next HD  01/06/2021 transfuse 1 unit of packed red blood cells today  01/10/2021 needs a unit of blood  1/11- repeat H&H in am. Continue to transfuse as needed.  1/13- Hgb is 5. Transfusing today. Post transfusion H&H  1/14- CBC pending for today  1/15- 6/18 - hemodynamically stable, recheck in AM   1/16-- 6.1/17.1 - critical low blood shortage in hospital - holding transfusion until next HD since he is hemodynamically stable and not actively bleeding   01/17/2021 day transfuse blood on dialysis today  01/18/2020 to hemoglobin up to 9 after transfusion  01/25/2022 no signs of bleeding most recent  hemoglobin 8  01/29/2022 patient will need transfusion today  1/31- Hgb 6.7 yesterday. Can transfuse with next HD session    RAHAT (acute kidney injury)  - tunneled HD line placement 12/30    HD MWF            On mechanically assisted ventilation  - 12/14 intubated, tracheostomy 1/4  Weaning waxes and wanes     1/27- continue to increase as tolerated. Currently doing 4 hours SIMV with reduced rate of 4. - will transition his fentanyl infusion to a patch of 50mcg today    Continue weaning from the vent  1/31- plan for 5 hours tid today. Continue to increase as tolerated  2/1- He was not able to complete trial last night. We will continue with current plan and try again today    Hypertension  Currently hypotensive. Holding any antihypertensives  BP looks a little better this am. Weaning pressor as tolerated  1/21- No longer on pressor.   1/31- hemodynamically stable                 Cecil Abernathy, DO  Pulmonology  [facility]

## 2022-02-02 NOTE — ASSESSMENT & PLAN NOTE
- tunneled HD line placement 12/30  - continue with dialysis per nephrology  - no signs of recovery

## 2022-02-02 NOTE — PROGRESS NOTES
[facility]  Pulmonology  Progress Note    Patient Name: Og Garcia  MRN: [id number]  Admission Date: 12/23/2021  Hospital Length of Stay: 41 days  Code Status: Prior  Attending Provider: Cecil Abernathy DO  Primary Care Provider: Hector Arndt DNP, FNP-C   Principal Problem: Denice gangrene    Subjective:     Interval History: No acute events overnight. Currently hemodynamically stable. Oxygenating adequately on the ventilator. Currently afebrile.    Objective:     Vital Signs (Most Recent):  Temp: 98.5 °F (36.9 °C) (02/02/22 1113)  Pulse: 96 (02/02/22 1415)  Resp: (!) 31 (02/02/22 1415)  BP: (!) 143/88 (02/02/22 1415)  SpO2: 100 % (02/02/22 1300) Vital Signs (24h Range):  Temp:  [97.4 °F (36.3 °C)-98.7 °F (37.1 °C)] 98.5 °F (36.9 °C)  Pulse:  [] 96  Resp:  [16-39] 31  SpO2:  [99 %-100 %] 100 %  BP: (121-158)/(66-95) 143/88     Weight: 103.5 kg (228 lb 2.8 oz)  Body mass index is 31.82 kg/m².      Intake/Output Summary (Last 24 hours) at 2/2/2022 1422  Last data filed at 2/2/2022 1415  Gross per 24 hour   Intake 1520 ml   Output 2900 ml   Net -1380 ml       Physical Exam  Vitals reviewed.   Constitutional:       General: He is not in acute distress.     Appearance: He is ill-appearing.      Comments: Chronically ill appearing   HENT:      Head: Normocephalic and atraumatic.      Right Ear: External ear normal.      Left Ear: External ear normal.      Nose: Nose normal.      Mouth/Throat:      Mouth: Mucous membranes are moist.      Comments: trach  Eyes:      Conjunctiva/sclera: Conjunctivae normal.      Pupils: Pupils are equal, round, and reactive to light.   Cardiovascular:      Rate and Rhythm: Regular rhythm. Tachycardia present.      Pulses: Normal pulses.      Heart sounds: Normal heart sounds.   Pulmonary:      Effort: No respiratory distress.      Comments: clear breath sounds anteriorly  Abdominal:      Palpations: Abdomen is soft.   Musculoskeletal:          General: Normal range of motion.      Cervical back: Neck supple.      Right lower leg: Edema present.      Left lower leg: Edema present.   Lymphadenopathy:      Cervical: No cervical adenopathy.   Skin:     General: Skin is warm and dry.   Neurological:      Cranial Nerves: No cranial nerve deficit.      Comments: Remains on mechanical ventilation. Will track at times         Vents:  Vent Mode: A/C (02/02/22 0400)  Set Rate: 16 BPM (02/02/22 0400)  Vt Set: 480 mL (02/02/22 0400)  Pressure Support: 15 cmH20 (02/02/22 0400)  PEEP/CPAP: 5 cmH20 (02/02/22 0400)  Oxygen Concentration (%): 40 (02/02/22 1110)  Peak Airway Pressure: 33 cmH2O (02/02/22 0400)  Total Ve: 12.3 mL (02/02/22 0400)  F/VT Ratio<105 (RSBI): (!) 46.15 (02/02/22 0400)    Lines/Drains/Airways     Peripherally Inserted Central Catheter Line            PICC Double Lumen 01/05/22 left basilic 28 days          Central Venous Catheter Line                 Hemodialysis Catheter 12/30/21 0900 right internal jugular 34 days          Drain                 NG/OG Tube [place] sump 18 Fr. Left nostril -- days         Colostomy 12/18/21 1030 Descending/sigmoid LUQ 46 days          Airway                 Surgical Airway 01/04/22 Shiley 29 days                Significant Labs:    CBC/Anemia Profile:  No results for input(s): WBC, HGB, HCT, PLT, MCV, RDW, IRON, FERRITIN, RETIC, FOLATE, [id number], OCCULTBLOOD in the last 48 hours.     Chemistries:  No results for input(s): NA, K, CL, CO2, BUN, CREATININE, CALCIUM, ALBUMIN, PROT, BILITOT, ALKPHOS, ALT, AST, GLUCOSE, MG, PHOS in the last 48 hours.    All pertinent labs within the past 24 hours have been reviewed.    Significant Imaging:  I have reviewed all pertinent imaging results/findings within the past 24 hours.    Assessment/Plan:     * Denice gangrene  - s/p multiple debridements  - wound vac in place  - ID consulted for antibiotic management: He was treated with rocephin, daptomycin, clindamycin. 12/21- change  clindamycin to ampicillin and treat for another week  - remains on ampicillin, this was changed to merrem 1/9  - pathology with fungal species consistent with mucormycosis initiated on amphotericin- mucormycosis with up to 50% mortality rate  - plan for OR tomorrow  Patient receiving amphotericin having spells of bradycardia will repeat blood cultures and do a echo to look for endocarditis  1/11- echo reviewed and no obvious vegetations. Amphotericin started on 1/7. The patient went to OR on 1/10 for debridement. Surgery note reviewed and appreciated.  1/12- back to OR today.  1/13- OR note reviewed. No purulence noted.    1/27- wound vac in place   2/1- seems to be improving    Disseminated mucormycosis  Defer to Dr. Hall on all antibiotic choices---Her note has been reviewed and is appreciated.     Amphotericin until 02/03   zosyn and gent for pseudomonal double coverage until 02/01      Type 2 diabetes mellitus without complication, without long-term current use of insulin  - continue basal/bolus insulin  -accuchecks are reasonable currently      Acute blood loss anemia  - H/H down to 5.5/16 - pt had bleeding from his HD insertion site yesterday this has now resolved.   - will transfuse 2 units PRBCs on HD  1/2- 9.7/27.5 - repeat CBC in AM  1/4- Hgb is 7.2. Not sure if he will be transfused during surgery. IF not we will try to get  Him transfused with next HD  01/06/2021 transfuse 1 unit of packed red blood cells today  01/10/2021 needs a unit of blood  1/11- repeat H&H in am. Continue to transfuse as needed.  1/13- Hgb is 5. Transfusing today. Post transfusion H&H  1/14- CBC pending for today  1/15- 6/18 - hemodynamically stable, recheck in AM   1/16-- 6.1/17.1 - critical low blood shortage in hospital - holding transfusion until next HD since he is hemodynamically stable and not actively bleeding   01/17/2021 day transfuse blood on dialysis today  01/18/2020 to hemoglobin up to 9 after  transfusion  01/25/2022 no signs of bleeding most recent hemoglobin 8  01/29/2022 patient will need transfusion today  1/31- Hgb 6.7 yesterday. Can transfuse with next HD session  2/2- H&H is pending    RAHAT (acute kidney injury)  - tunneled HD line placement 12/30  - continue with dialysis per nephrology  - no signs of recovery            On mechanically assisted ventilation  - 12/14 intubated, tracheostomy 1/4  Weaning waxes and wanes     1/27- continue to increase as tolerated. Currently doing 4 hours SIMV with reduced rate of 4. - will transition his fentanyl infusion to a patch of 50mcg today    Continue weaning from the vent  1/31- plan for 5 hours tid today. Continue to increase as tolerated  2/1- He was not able to complete trial last night. We will continue with current plan and try again today  2/2- had issues yesterday due to tachycardia and anxiety. I have added ativan prn    Hypertension  Currently hypotensive. Holding any antihypertensives  BP looks a little better this am. Weaning pressor as tolerated  1/21- No longer on pressor.   1/31- hemodynamically stable  2/2- BP looks good                 Cecil Abernathy, DO  Pulmonology  [facility]

## 2022-02-02 NOTE — PROGRESS NOTES
[facility]  Nephrology  Progress Note    Patient Name: Og Garcia  MRN: [id number]  Admission Date: 12/23/2021  Hospital Length of Stay: 41 days  Attending Provider: Cecil Abernathy DO   Primary Care Physician: Hector Arndt DNP, FNP-C  Principal Problem:Denice gangrene    Subjective:     HPI: The patient is known from the previous nephrology consult at [facility].  He is no at Specialty and continues to need dialysis support.      Interval History: The patient is resting.  He looks comfortable.  No other changes.    Review of patient's allergies indicates:  No Known Allergies  Current Facility-Administered Medications   Medication Frequency    0.9%  NaCl infusion (for blood administration) Q24H PRN    0.9%  NaCl infusion PRN    0.9%  NaCl infusion PRN    acetaminophen tablet 1,000 mg Q6H PRN    acetaminophen tablet 500 mg Q6H PRN    albuterol nebulizer solution 2.5 mg Q4H PRN    amphotericin B liposome (AMBISOME) 450 mg in dextrose 5 % 450 mL IVPB Q24H    bisacodyL EC tablet 10 mg Daily PRN    calcium gluconate 1 g in dextrose 5 % in water (D5W) 5 % 100 mL IVPB (MB+) Once    dextromethorphan-guaiFENesin  mg/5 ml liquid 10 mL Q6H PRN    dextrose 50% injection 12.5 g PRN    diltiaZEM tablet 60 mg Q6H    fentaNYL 50 mcg/hr 1 patch Q72H    gentamicin - pharmacy to dose pharmacy to manage frequency    glucagon (human recombinant) injection 1 mg PRN    heparin (porcine) injection 4,000 Units PRN    heparin (porcine) injection 5,000 Units Q8H    insulin aspart U-100 injection 1-10 Units Q6H    lorazepam injection 2 mg Q6H PRN    metoprolol injection 5 mg Q5 Min PRN    mupirocin 2 % ointment BID    NORepinephrine 32 mg in dextrose 5 % 250 mL infusion PRN    ondansetron injection 8 mg Q6H PRN    pantoprazole suspension 40 mg Daily    piperacillin-tazobactam (ZOSYN) 4.5 g in dextrose 5 % in water (D5W) 5 % 100 mL IVPB (MB+) Q12H    sevelamer carbonate pwpk 0.8 g  TID WM    simethicone chewable tablet 80 mg TID PRN    ticagrelor tablet 90 mg BID    traZODone tablet 50 mg Nightly PRN       Objective:     Vital Signs (Most Recent):  Temp: 98.5 °F (36.9 °C) (02/02/22 1113)  Pulse: 108 (02/02/22 1630)  Resp: (!) 29 (02/02/22 1615)  BP: 132/77 (02/02/22 1615)  SpO2: (!) 90 % (02/02/22 1630)  O2 Device (Oxygen Therapy): ventilator (02/02/22 1415) Vital Signs (24h Range):  Temp:  [97.4 °F (36.3 °C)-98.7 °F (37.1 °C)] 98.5 °F (36.9 °C)  Pulse:  [] 108  Resp:  [15-41] 29  SpO2:  [90 %-100 %] 90 %  BP: (124-162)/() 132/77     Weight: 103.5 kg (228 lb 2.8 oz) (02/02/22 0600)  Body mass index is 31.82 kg/m².  Body surface area is 2.28 meters squared.    I/O last 3 completed shifts:  In: 1020 [NG/GT:1020]  Out: 400 [Stool:400]    Physical Exam  Vitals reviewed.   HENT:      Mouth/Throat:      Mouth: Mucous membranes are dry.   Cardiovascular:      Rate and Rhythm: Regular rhythm.   Pulmonary:      Breath sounds: Normal breath sounds.      Comments: The patient is ventilated  Abdominal:      Palpations: Abdomen is soft.   Musculoskeletal:      Cervical back: Neck supple.   Neurological:      Mental Status: He is alert.         Significant Labs:  BMP:   Recent Labs   Lab 01/31/22  0629   *   *   K 4.4   CL 98   CO2 21   BUN 73*   CREATININE 3.22*   CALCIUM 8.0*   MG 2.2     CBC:   Recent Labs   Lab 01/31/22  1253   WBC 20.65*   RBC 2.26*   HGB 6.9*   HCT 19.4*      MCV 85.8   MCH 30.5   MCHC 35.6        Significant Imaging:  Labs: Reviewed    Assessment/Plan:     RAHAT (acute kidney injury)  Dialyzed yesterday  1/31/2022 Continue with scheduled hemodialysis.  2/1/2022 Continue with dialysis for this patient.  2/2/2022 Dialysis sessions are going well.        Thank you for your consult. I will follow-up with patient. Please contact us if you have any additional questions.    Edwin Pryor Jr, MD  Nephrology  [facility]

## 2022-02-02 NOTE — ASSESSMENT & PLAN NOTE
Dialyzed yesterday  1/31/2022 Continue with scheduled hemodialysis.  2/1/2022 Continue with dialysis for this patient.

## 2022-02-02 NOTE — SUBJECTIVE & OBJECTIVE
Interval History: The patient is resting.  The condition is about the same.    Review of patient's allergies indicates:  No Known Allergies  Current Facility-Administered Medications   Medication Frequency    0.9%  NaCl infusion (for blood administration) Q24H PRN    0.9%  NaCl infusion PRN    0.9%  NaCl infusion PRN    acetaminophen tablet 1,000 mg Q6H PRN    acetaminophen tablet 500 mg Q6H PRN    albuterol nebulizer solution 2.5 mg Q4H PRN    amphotericin B liposome (AMBISOME) 450 mg in dextrose 5 % 450 mL IVPB Q24H    bisacodyL EC tablet 10 mg Daily PRN    calcium gluconate 1 g in dextrose 5 % in water (D5W) 5 % 100 mL IVPB (MB+) Once    dextromethorphan-guaiFENesin  mg/5 ml liquid 10 mL Q6H PRN    dextrose 50% injection 12.5 g PRN    diltiaZEM tablet 30 mg Q6H    fentaNYL 50 mcg/hr 1 patch Q72H    gentamicin - pharmacy to dose pharmacy to manage frequency    glucagon (human recombinant) injection 1 mg PRN    heparin (porcine) injection 4,000 Units PRN    heparin (porcine) injection 5,000 Units Q8H    insulin aspart U-100 injection 1-10 Units Q6H    metoprolol injection 5 mg Q5 Min PRN    mupirocin 2 % ointment BID    NORepinephrine 32 mg in dextrose 5 % 250 mL infusion PRN    ondansetron injection 8 mg Q6H PRN    pantoprazole suspension 40 mg Daily    piperacillin-tazobactam (ZOSYN) 4.5 g in dextrose 5 % in water (D5W) 5 % 100 mL IVPB (MB+) Q12H    sevelamer carbonate pwpk 0.8 g TID WM    simethicone chewable tablet 80 mg TID PRN    ticagrelor tablet 90 mg BID    traZODone tablet 50 mg Nightly PRN       Objective:     Vital Signs (Most Recent):  Temp: 99.1 °F (37.3 °C) (02/01/22 0800)  Pulse: 110 (02/01/22 1700)  Resp: (!) 39 (02/01/22 1700)  BP: 127/72 (02/01/22 1814)  SpO2: 100 % (02/01/22 1700)  O2 Device (Oxygen Therapy): ventilator (02/01/22 1436) Vital Signs (24h Range):  Temp:  [98.8 °F (37.1 °C)-99.9 °F (37.7 °C)] 99.1 °F (37.3 °C)  Pulse:  [] 110  Resp:  [11-39]  39  SpO2:  [98 %-100 %] 100 %  BP: ()/(53-81) 127/72     Weight: 103 kg (227 lb 1.2 oz) (01/31/22 0600)  Body mass index is 31.67 kg/m².  Body surface area is 2.27 meters squared.    I/O last 3 completed shifts:  In: 1700 [NG/GT:1050; IV Piggyback:650]  Out: 1800 [Other:1500; Stool:300]    Physical Exam  Vitals reviewed.   HENT:      Mouth/Throat:      Mouth: Mucous membranes are dry.   Cardiovascular:      Rate and Rhythm: Regular rhythm.      Heart sounds: Normal heart sounds.   Pulmonary:      Comments: ventilated  Abdominal:      Palpations: Abdomen is soft.         Significant Labs:  BMP:   Recent Labs   Lab 01/31/22  0629   *   *   K 4.4   CL 98   CO2 21   BUN 73*   CREATININE 3.22*   CALCIUM 8.0*   MG 2.2     CBC:   Recent Labs   Lab 01/31/22  1253   WBC 20.65*   RBC 2.26*   HGB 6.9*   HCT 19.4*      MCV 85.8   MCH 30.5   MCHC 35.6        Significant Imaging:  Labs: Reviewed

## 2022-02-02 NOTE — SUBJECTIVE & OBJECTIVE
Interval History: No acute events overnight. Currently hemodynamically stable. Oxygenating adequately on the ventilator. Currently afebrile.    Objective:     Vital Signs (Most Recent):  Temp: 98.5 °F (36.9 °C) (02/02/22 1113)  Pulse: 96 (02/02/22 1415)  Resp: (!) 31 (02/02/22 1415)  BP: (!) 143/88 (02/02/22 1415)  SpO2: 100 % (02/02/22 1300) Vital Signs (24h Range):  Temp:  [97.4 °F (36.3 °C)-98.7 °F (37.1 °C)] 98.5 °F (36.9 °C)  Pulse:  [] 96  Resp:  [16-39] 31  SpO2:  [99 %-100 %] 100 %  BP: (121-158)/(66-95) 143/88     Weight: 103.5 kg (228 lb 2.8 oz)  Body mass index is 31.82 kg/m².      Intake/Output Summary (Last 24 hours) at 2/2/2022 1422  Last data filed at 2/2/2022 1415  Gross per 24 hour   Intake 1520 ml   Output 2900 ml   Net -1380 ml       Physical Exam  Vitals reviewed.   Constitutional:       General: He is not in acute distress.     Appearance: He is ill-appearing.      Comments: Chronically ill appearing   HENT:      Head: Normocephalic and atraumatic.      Right Ear: External ear normal.      Left Ear: External ear normal.      Nose: Nose normal.      Mouth/Throat:      Mouth: Mucous membranes are moist.      Comments: trach  Eyes:      Conjunctiva/sclera: Conjunctivae normal.      Pupils: Pupils are equal, round, and reactive to light.   Cardiovascular:      Rate and Rhythm: Regular rhythm. Tachycardia present.      Pulses: Normal pulses.      Heart sounds: Normal heart sounds.   Pulmonary:      Effort: No respiratory distress.      Comments: clear breath sounds anteriorly  Abdominal:      Palpations: Abdomen is soft.   Musculoskeletal:         General: Normal range of motion.      Cervical back: Neck supple.      Right lower leg: Edema present.      Left lower leg: Edema present.   Lymphadenopathy:      Cervical: No cervical adenopathy.   Skin:     General: Skin is warm and dry.   Neurological:      Cranial Nerves: No cranial nerve deficit.      Comments: Remains on mechanical ventilation.  Will track at times         Vents:  Vent Mode: A/C (02/02/22 0400)  Set Rate: 16 BPM (02/02/22 0400)  Vt Set: 480 mL (02/02/22 0400)  Pressure Support: 15 cmH20 (02/02/22 0400)  PEEP/CPAP: 5 cmH20 (02/02/22 0400)  Oxygen Concentration (%): 40 (02/02/22 1110)  Peak Airway Pressure: 33 cmH2O (02/02/22 0400)  Total Ve: 12.3 mL (02/02/22 0400)  F/VT Ratio<105 (RSBI): (!) 46.15 (02/02/22 0400)    Lines/Drains/Airways     Peripherally Inserted Central Catheter Line            PICC Double Lumen 01/05/22 left basilic 28 days          Central Venous Catheter Line                 Hemodialysis Catheter 12/30/21 0900 right internal jugular 34 days          Drain                 NG/OG Tube [place] sump 18 Fr. Left nostril -- days         Colostomy 12/18/21 1030 Descending/sigmoid LUQ 46 days          Airway                 Surgical Airway 01/04/22 [place] 29 days                Significant Labs:    CBC/Anemia Profile:  No results for input(s): WBC, HGB, HCT, PLT, MCV, RDW, IRON, FERRITIN, RETIC, FOLATE, [id number], OCCULTBLOOD in the last 48 hours.     Chemistries:  No results for input(s): NA, K, CL, CO2, BUN, CREATININE, CALCIUM, ALBUMIN, PROT, BILITOT, ALKPHOS, ALT, AST, GLUCOSE, MG, PHOS in the last 48 hours.    All pertinent labs within the past 24 hours have been reviewed.    Significant Imaging:  I have reviewed all pertinent imaging results/findings within the past 24 hours.

## 2022-02-02 NOTE — ASSESSMENT & PLAN NOTE
Currently hypotensive. Holding any antihypertensives  BP looks a little better this am. Weaning pressor as tolerated  1/21- No longer on pressor.   1/31- hemodynamically stable  2/2- BP looks good

## 2022-02-02 NOTE — PROGRESS NOTES
[facility]  Adult Nutrition  Follow-up Note         Reason for Assessment  Reason For Assessment: RD follow-up  Nutrition Risk Screen: tube feeding or parenteral nutrition  Malnutrition  Is Patient Malnourished: No  Nutrition Diagnosis  Increased protein Needs   related to Decreased/ impaired skin integrity as evidenced by wound    Nutrition Diagnosis Status: Improving      Nutrition Risk  Level of Risk/Frequency of Follow-up: high   Chewing or Swallowing Difficulty?: No Chewing or swallowing difficulty  Estimated/Assessed Needs  RMR ([place]-St. Jeor Equation): 1837.13 Activity Factor: 1 Injury Factor: 1.2   Total Ve: 12.3 mL Temp: 98.5 °F (36.9 °C)Axillary  Weight Used For Calorie Calculations: 103.5 kg (228 lb 2.8 oz)   Energy Need Method: [place] (modified) Energy Calorie Requirements (kcal): 1734  Weight Used For Protein Calculations: 81 kg (178 lb 9.2 oz)  Protein Requirements:   Estimated Fluid Requirement Method: RDA Method Fluid Requirements (mL): 2068  RDA Method (mL): 1734     Nutrition Prescription / Recommendations  Recommendation/Intervention: NG tube feeding: Vital AF @ 75ml/hr; H20 flush of 50ml q 4hr  Goals: pt will tolerate tube feeding; pt will meet estimated nutritional needs; wound healing  Nutrition Goal Status: progressing towards goal  Communication of RD Recs: reviewed with physician  Current Diet Order: NPO  Nutrition Order Comments: NG tube feeding: Vital AF @ 75ml/hr; H20 flush of 50ml q 4hr  Current Nutrition Support Formula Ordered: Vital AF 1.2  Current Nutrition Support Rate Ordered: 75 (ml)  Current Nutrition Support Frequency Ordered: hourly  Recommended Diet: Enteral Nutrition  NG tube feeding: Vital AF @ 75ml/hr; H20 flush of 50ml q 4hr  Recommended Oral Supplement: No Oral Supplements  Is Nutrition Support Recommended: No  Is Education Recommended: No  Monitor and Evaluation  % current Intake: Enteral Nutrition at goal  % intake to meet estimated  needs: Enteral Nutrition   Food and Nutrient Intake: enteral nutrition intake  Food and Nutrient Adminstration: enteral and parenteral nutrition administration  Anthropometric Measurements: height/length,body mass index,weight,weight change  Biochemical Data, Medical Tests and Procedures: electrolyte and renal panel,glucose/endocrine profile  Nutrition-Focused Physical Findings: skin  Enteral Calories (kcal): 2160  Enteral Protein (gm): 135  Enteral (Free Water) Fluid (mL): 1458  Free Water Flush Fluid (mL): 300  Parenteral Calories (kcal): 845  Parenteral Protein (gm): 48  Parenteral Fluid (mL): 960  Lipid Calories (kcals): 500 kcals  Other Calories (kcal): 528 (propofol)  Total Calories (kcal): 2160  Total Calories (kcal/kg): 2612  % Kcal Needs: 100  Total Protein (gm): 135  % Protein Needs: 100  IV Fluid (mL): 1764  Total Fluid Intake (mL): 1758  Energy Calories Required: meeting needs  Protein Required: meeting needs  Fluid Required: meeting needs  Tolerance: tolerating  Current Medical Diagnosis and Past Medical History  Diagnosis: gastrointestinal disease,infection/sepsis  Past Medical History:   Diagnosis Date    Hyperlipidemia     Hypertension      Nutrition/Diet History  Spiritual, Cultural Beliefs, Catholic Practices, Values that Affect Care: no  Food Allergies: NKFA  Factors Affecting Nutritional Intake: None identified at this time  Lab/Procedures/Meds  Recent Labs   Lab 01/31/22  0629   *   K 4.4   BUN 73*   CREATININE 3.22*   *   CALCIUM 8.0*   ALBUMIN 0.9*   CL 98   ALT 44   AST 65*     Last A1c: No results found for: HGBA1C  Lab Results   Component Value Date    RBC 2.26 (L) 01/31/2022    HGB 6.9 (L) 01/31/2022    HCT 19.4 (L) 01/31/2022    MCV 85.8 01/31/2022    MCH 30.5 01/31/2022    MCHC 35.6 01/31/2022     Pertinent Labs Reviewed: reviewed  Pertinent Labs Comments: Hct 19.4, Na 135, BUN 73, Creat 3.22, Glu 221, Alb .9  Pertinent Medications Reviewed: reviewed  Pertinent  "Medications Comments: heparin, insulin  Anthropometrics  Temp: 98.5 °F (36.9 °C)  Height Method: Stated  Height: 5' 11" (180.3 cm)  Height (inches): 71 in  Weight Method: Bed Scale  Weight: 103.5 kg (228 lb 2.8 oz)  Weight (lb): 228.18 lb  Ideal Body Weight (IBW), Male: 172 lb  % Ideal Body Weight, Male (lb): 115.36 %  BMI (Calculated): 31.8  BMI Grade: 25 - 29.9 - overweight     Nutrition by Nursing  Diet/Nutrition Received: tube feeding     Diet/Feeding Assistance: none  Diet/Feeding Tolerance: other (see comments)  Last Bowel Movement: 02/02/22       NG/OG Tube [place] sump 18 Fr. Left nostril-Feeding Type: continuous,by pump       NG/OG Tube [place] sump 18 Fr. Left nostril-Current Rate (mL/hr): 75 mL/hr       NG/OG Tube [place] sump 18 Fr. Left nostril-Goal Rate (mL/hr): 75 mL/hr       NG/OG Tube [place] sump 18 Fr. Left nostril-Formula Name: vital AF  Nutrition Follow-Up  RD Follow-up?: Yes  Assessment and Plan  No new Assessment & Plan notes have been filed under this hospital service since the last note was generated.  Service: Nutrition     "

## 2022-02-02 NOTE — ASSESSMENT & PLAN NOTE
Dialyzed yesterday  1/31/2022 Continue with scheduled hemodialysis.  2/1/2022 Continue with dialysis for this patient.  2/2/2022 Dialysis sessions are going well.

## 2022-02-02 NOTE — SUBJECTIVE & OBJECTIVE
Interval History: The patient is resting.  He looks comfortable.  No other changes.    Review of patient's allergies indicates:  No Known Allergies  Current Facility-Administered Medications   Medication Frequency    0.9%  NaCl infusion (for blood administration) Q24H PRN    0.9%  NaCl infusion PRN    0.9%  NaCl infusion PRN    acetaminophen tablet 1,000 mg Q6H PRN    acetaminophen tablet 500 mg Q6H PRN    albuterol nebulizer solution 2.5 mg Q4H PRN    amphotericin B liposome (AMBISOME) 450 mg in dextrose 5 % 450 mL IVPB Q24H    bisacodyL EC tablet 10 mg Daily PRN    calcium gluconate 1 g in dextrose 5 % in water (D5W) 5 % 100 mL IVPB (MB+) Once    dextromethorphan-guaiFENesin  mg/5 ml liquid 10 mL Q6H PRN    dextrose 50% injection 12.5 g PRN    diltiaZEM tablet 60 mg Q6H    fentaNYL 50 mcg/hr 1 patch Q72H    gentamicin - pharmacy to dose pharmacy to manage frequency    glucagon (human recombinant) injection 1 mg PRN    heparin (porcine) injection 4,000 Units PRN    heparin (porcine) injection 5,000 Units Q8H    insulin aspart U-100 injection 1-10 Units Q6H    lorazepam injection 2 mg Q6H PRN    metoprolol injection 5 mg Q5 Min PRN    mupirocin 2 % ointment BID    NORepinephrine 32 mg in dextrose 5 % 250 mL infusion PRN    ondansetron injection 8 mg Q6H PRN    pantoprazole suspension 40 mg Daily    piperacillin-tazobactam (ZOSYN) 4.5 g in dextrose 5 % in water (D5W) 5 % 100 mL IVPB (MB+) Q12H    sevelamer carbonate pwpk 0.8 g TID WM    simethicone chewable tablet 80 mg TID PRN    ticagrelor tablet 90 mg BID    traZODone tablet 50 mg Nightly PRN       Objective:     Vital Signs (Most Recent):  Temp: 98.5 °F (36.9 °C) (02/02/22 1113)  Pulse: 108 (02/02/22 1630)  Resp: (!) 29 (02/02/22 1615)  BP: 132/77 (02/02/22 1615)  SpO2: (!) 90 % (02/02/22 1630)  O2 Device (Oxygen Therapy): ventilator (02/02/22 1415) Vital Signs (24h Range):  Temp:  [97.4 °F (36.3 °C)-98.7 °F (37.1 °C)] 98.5 °F  (36.9 °C)  Pulse:  [] 108  Resp:  [15-41] 29  SpO2:  [90 %-100 %] 90 %  BP: (124-162)/() 132/77     Weight: 103.5 kg (228 lb 2.8 oz) (02/02/22 0600)  Body mass index is 31.82 kg/m².  Body surface area is 2.28 meters squared.    I/O last 3 completed shifts:  In: 1020 [NG/GT:1020]  Out: 400 [Stool:400]    Physical Exam  Vitals reviewed.   HENT:      Mouth/Throat:      Mouth: Mucous membranes are dry.   Cardiovascular:      Rate and Rhythm: Regular rhythm.   Pulmonary:      Breath sounds: Normal breath sounds.      Comments: The patient is ventilated  Abdominal:      Palpations: Abdomen is soft.   Musculoskeletal:      Cervical back: Neck supple.   Neurological:      Mental Status: He is alert.         Significant Labs:  BMP:   Recent Labs   Lab 01/31/22  0629   *   *   K 4.4   CL 98   CO2 21   BUN 73*   CREATININE 3.22*   CALCIUM 8.0*   MG 2.2     CBC:   Recent Labs   Lab 01/31/22  1253   WBC 20.65*   RBC 2.26*   HGB 6.9*   HCT 19.4*      MCV 85.8   MCH 30.5   MCHC 35.6        Significant Imaging:  Labs: Reviewed

## 2022-02-02 NOTE — PROGRESS NOTES
Pharmacokinetic Follow Up: Gentamicin    Assessment of levels:   Pt's random level of 2.2 is above the desired goal of </= 2    Regimen Plan:   Pharmacy will wait on dosing gent at this time.  A random gent level has been ordered for 2/4 at 1000    Drug levels (last 3 results):  No results for input(s): AMIKACINPEAK, AMIKACINTROU, AMIKACINRAND, AMIKACIN in the last 72 hours.    No results for input(s): GENTAMICIN, GENTPEAK, GENTTROUGH, GENT10, GENT12, GENT8, GENTRANDOM in the last 72 hours.    No results for input(s): TOBRA8, TOBRA10, TOBRA12, TOBRARND, TOBRAMYCIN, TOBRAPEAK, TOBRATROUGH, TOBRAMYCINPE, TOBRAMYCINRA, TOBRAMYCINTR in the last 72 hours.    Pharmacy will continue to monitor.    Please contact pharmacy at extension [phone] with any questions regarding this assessment.    Patient brief summary:  Og Garcia is a 66 y.o. male initiated on aminoglycoside therapy for treatment of  pneumonia    Drug Allergies:   Review of patient's allergies indicates:  No Known Allergies    Actual Body Weight:   103.5 kg    Adjust Body Weight:   86.6 kg    Ideal Body Weight:  75.3 kg    Renal Function:   Estimated Creatinine Clearance: 27.6 mL/min (A) (based on SCr of 3.22 mg/dL (H)).,     Dialysis Method (if applicable):  N/A    CBC (last 72 hours):  Recent Labs   Lab Result Units 01/30/22  1524 01/31/22  1253   WBC K/uL  --  20.65*   Hemoglobin g/dL 8.3* 6.9*   Hematocrit % 24.4* 19.4*   Platelet Count K/uL  --  310   Lymphocytes % %  --  12.6*   Lymphocytes, Man % %  --  11*   Monocytes % %  --  11.2*   Monocytes, Man % %  --  6   Eosinophils % %  --  0.6*   Basophils % %  --  0.5   Basophils, Man % %  --  1       Metabolic Panel (last 72 hours):  Recent Labs   Lab Result Units 01/31/22  0629   Sodium mmol/L 135*   Potassium mmol/L 4.4   Chloride mmol/L 98   CO2 mmol/L 21   Glucose mg/dL 221*   BUN mg/dL 73*   Creatinine mg/dL 3.22*   Albumin g/dL 0.9*   Bilirubin, Total mg/dL 0.5   Alk Phos U/L 337*   AST U/L 65*   ALT  U/L 44   Magnesium mg/dL 2.2       Aminoglycoside Administrations:  aminoglycosides given in last 96 hours                     gentamicin (GARAMYCIN) 260 mg in sodium chloride 0.9% 100 mL IVPB (mg) 260 mg New Bag 01/31/22 1539                    Microbiologic Results:  Microbiology Results (last 7 days)       ** No results found for the last 168 hours. **

## 2022-02-02 NOTE — PROGRESS NOTES
[facility]  Nephrology  Progress Note    Patient Name: Og Garcia  MRN: [id number]  Admission Date: 12/23/2021  Hospital Length of Stay: 40 days  Attending Provider: Cecil Abernathy DO   Primary Care Physician: Hector Arndt DNP, FNP-C  Principal Problem:Denice gangrene    Subjective:     HPI: The patient is known from the previous nephrology consult at [facility].  He is no at Specialty and continues to need dialysis support.      Interval History: The patient is resting.  The condition is about the same.    Review of patient's allergies indicates:  No Known Allergies  Current Facility-Administered Medications   Medication Frequency    0.9%  NaCl infusion (for blood administration) Q24H PRN    0.9%  NaCl infusion PRN    0.9%  NaCl infusion PRN    acetaminophen tablet 1,000 mg Q6H PRN    acetaminophen tablet 500 mg Q6H PRN    albuterol nebulizer solution 2.5 mg Q4H PRN    amphotericin B liposome (AMBISOME) 450 mg in dextrose 5 % 450 mL IVPB Q24H    bisacodyL EC tablet 10 mg Daily PRN    calcium gluconate 1 g in dextrose 5 % in water (D5W) 5 % 100 mL IVPB (MB+) Once    dextromethorphan-guaiFENesin  mg/5 ml liquid 10 mL Q6H PRN    dextrose 50% injection 12.5 g PRN    diltiaZEM tablet 30 mg Q6H    fentaNYL 50 mcg/hr 1 patch Q72H    gentamicin - pharmacy to dose pharmacy to manage frequency    glucagon (human recombinant) injection 1 mg PRN    heparin (porcine) injection 4,000 Units PRN    heparin (porcine) injection 5,000 Units Q8H    insulin aspart U-100 injection 1-10 Units Q6H    metoprolol injection 5 mg Q5 Min PRN    mupirocin 2 % ointment BID    NORepinephrine 32 mg in dextrose 5 % 250 mL infusion PRN    ondansetron injection 8 mg Q6H PRN    pantoprazole suspension 40 mg Daily    piperacillin-tazobactam (ZOSYN) 4.5 g in dextrose 5 % in water (D5W) 5 % 100 mL IVPB (MB+) Q12H    sevelamer carbonate pwpk 0.8 g TID WM    simethicone chewable tablet 80 mg  TID PRN    ticagrelor tablet 90 mg BID    traZODone tablet 50 mg Nightly PRN       Objective:     Vital Signs (Most Recent):  Temp: 99.1 °F (37.3 °C) (02/01/22 0800)  Pulse: 110 (02/01/22 1700)  Resp: (!) 39 (02/01/22 1700)  BP: 127/72 (02/01/22 1814)  SpO2: 100 % (02/01/22 1700)  O2 Device (Oxygen Therapy): ventilator (02/01/22 1436) Vital Signs (24h Range):  Temp:  [98.8 °F (37.1 °C)-99.9 °F (37.7 °C)] 99.1 °F (37.3 °C)  Pulse:  [] 110  Resp:  [11-39] 39  SpO2:  [98 %-100 %] 100 %  BP: ()/(53-81) 127/72     Weight: 103 kg (227 lb 1.2 oz) (01/31/22 0600)  Body mass index is 31.67 kg/m².  Body surface area is 2.27 meters squared.    I/O last 3 completed shifts:  In: 1700 [NG/GT:1050; IV Piggyback:650]  Out: 1800 [Other:1500; Stool:300]    Physical Exam  Vitals reviewed.   HENT:      Mouth/Throat:      Mouth: Mucous membranes are dry.   Cardiovascular:      Rate and Rhythm: Regular rhythm.      Heart sounds: Normal heart sounds.   Pulmonary:      Comments: ventilated  Abdominal:      Palpations: Abdomen is soft.         Significant Labs:  BMP:   Recent Labs   Lab 01/31/22  0629   *   *   K 4.4   CL 98   CO2 21   BUN 73*   CREATININE 3.22*   CALCIUM 8.0*   MG 2.2     CBC:   Recent Labs   Lab 01/31/22  1253   WBC 20.65*   RBC 2.26*   HGB 6.9*   HCT 19.4*      MCV 85.8   MCH 30.5   MCHC 35.6        Significant Imaging:  Labs: Reviewed    Assessment/Plan:     * Denice gangrene  Continue wound care      RAHAT (acute kidney injury)  Dialyzed yesterday  1/31/2022 Continue with scheduled hemodialysis.  2/1/2022 Continue with dialysis for this patient.        Thank you for your consult. I will follow-up with patient. Please contact us if you have any additional questions.    Edwin Pryor Jr, MD  Nephrology  [facility]

## 2022-02-02 NOTE — ASSESSMENT & PLAN NOTE
- 12/14 intubated, tracheostomy 1/4  Weaning waxes and wanes     1/27- continue to increase as tolerated. Currently doing 4 hours SIMV with reduced rate of 4. - will transition his fentanyl infusion to a patch of 50mcg today    Continue weaning from the vent  1/31- plan for 5 hours tid today. Continue to increase as tolerated  2/1- He was not able to complete trial last night. We will continue with current plan and try again today  2/2- had issues yesterday due to tachycardia and anxiety. I have added ativan prn

## 2022-02-03 NOTE — SUBJECTIVE & OBJECTIVE
Interval History: The patient is resting.  No acute changes.      Review of patient's allergies indicates:  No Known Allergies  Current Facility-Administered Medications   Medication Frequency    0.9%  NaCl infusion (for blood administration) Q24H PRN    0.9%  NaCl infusion PRN    0.9%  NaCl infusion PRN    acetaminophen tablet 1,000 mg Q6H PRN    acetaminophen tablet 500 mg Q6H PRN    albuterol nebulizer solution 2.5 mg Q4H PRN    amphotericin B liposome (AMBISOME) 450 mg in dextrose 5 % 450 mL IVPB Q24H    bisacodyL EC tablet 10 mg Daily PRN    calcium gluconate 1 g in dextrose 5 % in water (D5W) 5 % 100 mL IVPB (MB+) Once    dextromethorphan-guaiFENesin  mg/5 ml liquid 10 mL Q6H PRN    dextrose 50% injection 12.5 g PRN    diltiaZEM tablet 60 mg Q6H    fentaNYL 50 mcg/hr 1 patch Q72H    gentamicin - pharmacy to dose pharmacy to manage frequency    glucagon (human recombinant) injection 1 mg PRN    heparin (porcine) injection 4,000 Units PRN    heparin (porcine) injection 5,000 Units Q8H    insulin aspart U-100 injection 1-10 Units Q6H    lorazepam injection 2 mg Q6H PRN    metoprolol injection 5 mg Q5 Min PRN    mupirocin 2 % ointment BID    NORepinephrine 32 mg in dextrose 5 % 250 mL infusion PRN    ondansetron injection 8 mg Q6H PRN    pantoprazole suspension 40 mg Daily    piperacillin-tazobactam (ZOSYN) 4.5 g in dextrose 5 % in water (D5W) 5 % 100 mL IVPB (MB+) Q12H    sevelamer carbonate pwpk 0.8 g TID WM    simethicone chewable tablet 80 mg TID PRN    ticagrelor tablet 90 mg BID    traZODone tablet 50 mg Nightly PRN       Objective:     Vital Signs (Most Recent):  Temp: 98.6 °F (37 °C) (02/03/22 0745)  Pulse: 86 (02/03/22 0745)  Resp: 18 (02/03/22 0745)  BP: 116/67 (02/03/22 0745)  SpO2: 98 % (02/03/22 0745)  O2 Device (Oxygen Therapy): ventilator (02/02/22 1415) Vital Signs (24h Range):  Temp:  [98.5 °F (36.9 °C)-99.7 °F (37.6 °C)] 98.6 °F (37 °C)  Pulse:  []  86  Resp:  [12-41] 18  SpO2:  [90 %-100 %] 98 %  BP: ()/() 116/67     Weight: 101.4 kg (223 lb 9.6 oz) (02/03/22 0624)  Body mass index is 31.19 kg/m².  Body surface area is 2.25 meters squared.    I/O last 3 completed shifts:  In: 2070 [Other:500; NG/GT:1020; IV Piggyback:550]  Out: 3500 [Other:2500; Stool:1000]    Physical Exam  Vitals reviewed.   HENT:      Mouth/Throat:      Mouth: Mucous membranes are moist.   Cardiovascular:      Rate and Rhythm: Regular rhythm.      Heart sounds: Normal heart sounds.   Pulmonary:      Breath sounds: Normal breath sounds.   Abdominal:      Palpations: Abdomen is soft.   Musculoskeletal:      Cervical back: Neck supple.   Neurological:      Mental Status: He is alert.         Significant Labs:  BMP:   Recent Labs   Lab 01/31/22  0629   *   *   K 4.4   CL 98   CO2 21   BUN 73*   CREATININE 3.22*   CALCIUM 8.0*   MG 2.2     CBC:   Recent Labs   Lab 01/31/22  1253   WBC 20.65*   RBC 2.26*   HGB 6.9*   HCT 19.4*      MCV 85.8   MCH 30.5   MCHC 35.6        Significant Imaging:  Labs: Reviewed

## 2022-02-03 NOTE — PROGRESS NOTES
[facility]  Pulmonology  Progress Note    Patient Name: Og Garcia  MRN: [id number]  Admission Date: 12/23/2021  Hospital Length of Stay: 42 days  Code Status: Prior  Attending Provider: Cecil Abernathy DO  Primary Care Provider: Hector Arndt DNP, FNP-C   Principal Problem: Denice gangrene    Subjective:     Interval History: No acute events overnight. Currently hemodynamically stable. Oxygenating adequately on the ventilator. Currently afebrile.    Objective:     Vital Signs (Most Recent):  Temp: 98.4 °F (36.9 °C) (02/03/22 1145)  Pulse: 98 (02/03/22 1445)  Resp: (!) 24 (02/03/22 1445)  BP: 130/77 (02/03/22 1445)  SpO2: 100 % (02/03/22 1445) Vital Signs (24h Range):  Temp:  [98.4 °F (36.9 °C)-99.7 °F (37.6 °C)] 98.4 °F (36.9 °C)  Pulse:  [] 98  Resp:  [12-40] 24  SpO2:  [98 %-100 %] 100 %  BP: ()/(49-84) 130/77     Weight: 101.4 kg (223 lb 9.6 oz)  Body mass index is 31.19 kg/m².      Intake/Output Summary (Last 24 hours) at 2/3/2022 1632  Last data filed at 2/3/2022 0624  Gross per 24 hour   Intake 550 ml   Output 300 ml   Net 250 ml       Physical Exam  Vitals reviewed.   Constitutional:       General: He is not in acute distress.     Appearance: He is ill-appearing.      Comments: Chronically ill appearing   HENT:      Head: Normocephalic and atraumatic.      Right Ear: External ear normal.      Left Ear: External ear normal.      Nose: Nose normal.      Mouth/Throat:      Mouth: Mucous membranes are moist.      Comments: trach  Eyes:      Conjunctiva/sclera: Conjunctivae normal.      Pupils: Pupils are equal, round, and reactive to light.   Cardiovascular:      Rate and Rhythm: Regular rhythm. Tachycardia present.      Pulses: Normal pulses.      Heart sounds: Normal heart sounds.   Pulmonary:      Effort: No respiratory distress.      Comments: clear breath sounds anteriorly  Abdominal:      Palpations: Abdomen is soft.   Musculoskeletal:         General:  Normal range of motion.      Cervical back: Neck supple.      Right lower leg: Edema present.      Left lower leg: Edema present.   Lymphadenopathy:      Cervical: No cervical adenopathy.   Skin:     General: Skin is warm and dry.   Neurological:      Cranial Nerves: No cranial nerve deficit.      Comments: Remains on mechanical ventilation. Will track at times         Vents:  Vent Mode: A/C (02/03/22 0822)  Set Rate: 16 BPM (02/03/22 0822)  Vt Set: 480 mL (02/03/22 0822)  Pressure Support: 15 cmH20 (02/02/22 0400)  PEEP/CPAP: 5 cmH20 (02/03/22 0822)  Oxygen Concentration (%): 40 (02/03/22 0345)  Peak Airway Pressure: 22 cmH2O (02/03/22 0822)  Total Ve: 7 mL (02/03/22 0822)  F/VT Ratio<105 (RSBI): (!) 46.15 (02/02/22 0400)    Lines/Drains/Airways     Peripherally Inserted Central Catheter Line            PICC Double Lumen 01/05/22 left basilic 29 days          Central Venous Catheter Line                 Hemodialysis Catheter 12/30/21 0900 right internal jugular 35 days          Drain                 NG/OG Tube [place] sump 18 Fr. Left nostril -- days         Colostomy 12/18/21 1030 Descending/sigmoid LUQ 47 days          Airway                 Surgical Airway 01/04/22 Shiley 30 days                Significant Labs:    CBC/Anemia Profile:  Recent Labs   Lab 02/03/22  0824   WBC 14.88*   HGB 5.7*   HCT 16.9*      MCV 89.9   RDW 15.7*        Chemistries:  Recent Labs   Lab 02/03/22  0824      K 4.1   CL 99   CO2 23   BUN 43*   CREATININE 1.97*   CALCIUM 7.5*       All pertinent labs within the past 24 hours have been reviewed.    Significant Imaging:  I have reviewed all pertinent imaging results/findings within the past 24 hours.    Assessment/Plan:     * Denice gangrene  - s/p multiple debridements  - wound vac in place  - ID consulted for antibiotic management: He was treated with rocephin, daptomycin, clindamycin. 12/21- change clindamycin to ampicillin and treat for another week  - remains on  ampicillin, this was changed to merrem 1/9  - pathology with fungal species consistent with mucormycosis initiated on amphotericin- mucormycosis with up to 50% mortality rate  - plan for OR tomorrow  Patient receiving amphotericin having spells of bradycardia will repeat blood cultures and do a echo to look for endocarditis  1/11- echo reviewed and no obvious vegetations. Amphotericin started on 1/7. The patient went to OR on 1/10 for debridement. Surgery note reviewed and appreciated.  1/12- back to OR today.  1/13- OR note reviewed. No purulence noted.    1/27- wound vac in place   2/1- seems to be improving    Disseminated mucormycosis  Defer to Dr. Hall on all antibiotic choices---Her note has been reviewed and is appreciated.     Amphotericin until 02/03   zosyn and gent for pseudomonal double coverage until 02/01      Type 2 diabetes mellitus without complication, without long-term current use of insulin  - continue basal/bolus insulin  -accuchecks are reasonable currently      Acute blood loss anemia  - H/H down to 5.5/16 - pt had bleeding from his HD insertion site yesterday this has now resolved.   - will transfuse 2 units PRBCs on HD  1/2- 9.7/27.5 - repeat CBC in AM  1/4- Hgb is 7.2. Not sure if he will be transfused during surgery. IF not we will try to get  Him transfused with next HD  01/06/2021 transfuse 1 unit of packed red blood cells today  01/10/2021 needs a unit of blood  1/11- repeat H&H in am. Continue to transfuse as needed.  1/13- Hgb is 5. Transfusing today. Post transfusion H&H  1/14- CBC pending for today  1/15- 6/18 - hemodynamically stable, recheck in AM   1/16-- 6.1/17.1 - critical low blood shortage in hospital - holding transfusion until next HD since he is hemodynamically stable and not actively bleeding   01/17/2021 day transfuse blood on dialysis today  01/18/2020 to hemoglobin up to 9 after transfusion  01/25/2022 no signs of bleeding most recent hemoglobin 8  01/29/2022  patient will need transfusion today  1/31- Hgb 6.7 yesterday. Can transfuse with next HD session  2/2- H&H is pending  2/3- plan to transfuse 1 unit of PRBC's    RAHAT (acute kidney injury)  - tunneled HD line placement 12/30  - continue with dialysis per nephrology              On mechanically assisted ventilation  - 12/14 intubated, tracheostomy 1/4  Weaning waxes and wanes     1/27- continue to increase as tolerated. Currently doing 4 hours SIMV with reduced rate of 4. - will transition his fentanyl infusion to a patch of 50mcg today    Continue weaning from the vent  1/31- plan for 5 hours tid today. Continue to increase as tolerated  2/1- He was not able to complete trial last night. We will continue with current plan and try again today  2/2- had issues yesterday due to tachycardia and anxiety. I have added ativan prn  2/3 was not able to do any trials yesterday. I am going to rest on the ventilator today    Hypertension  Currently hypotensive. Holding any antihypertensives  BP looks a little better this am. Weaning pressor as tolerated  1/21- No longer on pressor.   1/31- hemodynamically stable  2/2- BP looks good                 Cecil Abernathy, DO  Pulmonology  [facility]

## 2022-02-03 NOTE — PROGRESS NOTES
[facility]  Nephrology  Progress Note    Patient Name: Og Garcia  MRN: [id number]  Admission Date: 12/23/2021  Hospital Length of Stay: 42 days  Attending Provider: Cecil Abernathy DO   Primary Care Physician: Hector Arndt DNP, FNP-C  Principal Problem:Denice gangrene    Subjective:     HPI: The patient is known from the previous nephrology consult at [facility].  He is no at Specialty and continues to need dialysis support.      Interval History: The patient is resting.  No acute changes.      Review of patient's allergies indicates:  No Known Allergies  Current Facility-Administered Medications   Medication Frequency    0.9%  NaCl infusion (for blood administration) Q24H PRN    0.9%  NaCl infusion PRN    0.9%  NaCl infusion PRN    acetaminophen tablet 1,000 mg Q6H PRN    acetaminophen tablet 500 mg Q6H PRN    albuterol nebulizer solution 2.5 mg Q4H PRN    amphotericin B liposome (AMBISOME) 450 mg in dextrose 5 % 450 mL IVPB Q24H    bisacodyL EC tablet 10 mg Daily PRN    calcium gluconate 1 g in dextrose 5 % in water (D5W) 5 % 100 mL IVPB (MB+) Once    dextromethorphan-guaiFENesin  mg/5 ml liquid 10 mL Q6H PRN    dextrose 50% injection 12.5 g PRN    diltiaZEM tablet 60 mg Q6H    fentaNYL 50 mcg/hr 1 patch Q72H    gentamicin - pharmacy to dose pharmacy to manage frequency    glucagon (human recombinant) injection 1 mg PRN    heparin (porcine) injection 4,000 Units PRN    heparin (porcine) injection 5,000 Units Q8H    insulin aspart U-100 injection 1-10 Units Q6H    lorazepam injection 2 mg Q6H PRN    metoprolol injection 5 mg Q5 Min PRN    mupirocin 2 % ointment BID    NORepinephrine 32 mg in dextrose 5 % 250 mL infusion PRN    ondansetron injection 8 mg Q6H PRN    pantoprazole suspension 40 mg Daily    piperacillin-tazobactam (ZOSYN) 4.5 g in dextrose 5 % in water (D5W) 5 % 100 mL IVPB (MB+) Q12H    sevelamer carbonate pwpk 0.8 g TID WM     simethicone chewable tablet 80 mg TID PRN    ticagrelor tablet 90 mg BID    traZODone tablet 50 mg Nightly PRN       Objective:     Vital Signs (Most Recent):  Temp: 98.6 °F (37 °C) (02/03/22 0745)  Pulse: 86 (02/03/22 0745)  Resp: 18 (02/03/22 0745)  BP: 116/67 (02/03/22 0745)  SpO2: 98 % (02/03/22 0745)  O2 Device (Oxygen Therapy): ventilator (02/02/22 1415) Vital Signs (24h Range):  Temp:  [98.5 °F (36.9 °C)-99.7 °F (37.6 °C)] 98.6 °F (37 °C)  Pulse:  [] 86  Resp:  [12-41] 18  SpO2:  [90 %-100 %] 98 %  BP: ()/() 116/67     Weight: 101.4 kg (223 lb 9.6 oz) (02/03/22 0624)  Body mass index is 31.19 kg/m².  Body surface area is 2.25 meters squared.    I/O last 3 completed shifts:  In: 2070 [Other:500; NG/GT:1020; IV Piggyback:550]  Out: 3500 [Other:2500; Stool:1000]    Physical Exam  Vitals reviewed.   HENT:      Mouth/Throat:      Mouth: Mucous membranes are moist.   Cardiovascular:      Rate and Rhythm: Regular rhythm.      Heart sounds: Normal heart sounds.   Pulmonary:      Breath sounds: Normal breath sounds.   Abdominal:      Palpations: Abdomen is soft.   Musculoskeletal:      Cervical back: Neck supple.   Neurological:      Mental Status: He is alert.         Significant Labs:  BMP:   Recent Labs   Lab 01/31/22  0629   *   *   K 4.4   CL 98   CO2 21   BUN 73*   CREATININE 3.22*   CALCIUM 8.0*   MG 2.2     CBC:   Recent Labs   Lab 01/31/22  1253   WBC 20.65*   RBC 2.26*   HGB 6.9*   HCT 19.4*      MCV 85.8   MCH 30.5   MCHC 35.6        Significant Imaging:  Labs: Reviewed    Assessment/Plan:     * Denice gangrene  Continue wound care  2/3/2022 Continue with wound care      RAHAT (acute kidney injury)  Dialyzed yesterday  1/31/2022 Continue with scheduled hemodialysis.  2/1/2022 Continue with dialysis for this patient.  2/2/2022 Dialysis sessions are going well.  3/3/2022 Dialysis as scheduled for this patient.        Thank you for your consult. I will follow-up with  patient. Please contact us if you have any additional questions.    Edwin Pryor Jr, MD  Nephrology  [facility]

## 2022-02-03 NOTE — SUBJECTIVE & OBJECTIVE
Interval History: No acute events overnight. Currently hemodynamically stable. Oxygenating adequately on the ventilator. Currently afebrile.    Objective:     Vital Signs (Most Recent):  Temp: 98.4 °F (36.9 °C) (02/03/22 1145)  Pulse: 98 (02/03/22 1445)  Resp: (!) 24 (02/03/22 1445)  BP: 130/77 (02/03/22 1445)  SpO2: 100 % (02/03/22 1445) Vital Signs (24h Range):  Temp:  [98.4 °F (36.9 °C)-99.7 °F (37.6 °C)] 98.4 °F (36.9 °C)  Pulse:  [] 98  Resp:  [12-40] 24  SpO2:  [98 %-100 %] 100 %  BP: ()/(49-84) 130/77     Weight: 101.4 kg (223 lb 9.6 oz)  Body mass index is 31.19 kg/m².      Intake/Output Summary (Last 24 hours) at 2/3/2022 1632  Last data filed at 2/3/2022 0624  Gross per 24 hour   Intake 550 ml   Output 300 ml   Net 250 ml       Physical Exam  Vitals reviewed.   Constitutional:       General: He is not in acute distress.     Appearance: He is ill-appearing.      Comments: Chronically ill appearing   HENT:      Head: Normocephalic and atraumatic.      Right Ear: External ear normal.      Left Ear: External ear normal.      Nose: Nose normal.      Mouth/Throat:      Mouth: Mucous membranes are moist.      Comments: trach  Eyes:      Conjunctiva/sclera: Conjunctivae normal.      Pupils: Pupils are equal, round, and reactive to light.   Cardiovascular:      Rate and Rhythm: Regular rhythm. Tachycardia present.      Pulses: Normal pulses.      Heart sounds: Normal heart sounds.   Pulmonary:      Effort: No respiratory distress.      Comments: clear breath sounds anteriorly  Abdominal:      Palpations: Abdomen is soft.   Musculoskeletal:         General: Normal range of motion.      Cervical back: Neck supple.      Right lower leg: Edema present.      Left lower leg: Edema present.   Lymphadenopathy:      Cervical: No cervical adenopathy.   Skin:     General: Skin is warm and dry.   Neurological:      Cranial Nerves: No cranial nerve deficit.      Comments: Remains on mechanical ventilation. Will  track at times         Vents:  Vent Mode: A/C (02/03/22 0822)  Set Rate: 16 BPM (02/03/22 0822)  Vt Set: 480 mL (02/03/22 0822)  Pressure Support: 15 cmH20 (02/02/22 0400)  PEEP/CPAP: 5 cmH20 (02/03/22 0822)  Oxygen Concentration (%): 40 (02/03/22 0345)  Peak Airway Pressure: 22 cmH2O (02/03/22 0822)  Total Ve: 7 mL (02/03/22 0822)  F/VT Ratio<105 (RSBI): (!) 46.15 (02/02/22 0400)    Lines/Drains/Airways     Peripherally Inserted Central Catheter Line            PICC Double Lumen 01/05/22 left basilic 29 days          Central Venous Catheter Line                 Hemodialysis Catheter 12/30/21 0900 right internal jugular 35 days          Drain                 NG/OG Tube [place] sump 18 Fr. Left nostril -- days         Colostomy 12/18/21 1030 Descending/sigmoid LUQ 47 days          Airway                 Surgical Airway 01/04/22 Shiley 30 days                Significant Labs:    CBC/Anemia Profile:  Recent Labs   Lab 02/03/22 0824   WBC 14.88*   HGB 5.7*   HCT 16.9*      MCV 89.9   RDW 15.7*        Chemistries:  Recent Labs   Lab 02/03/22 0824      K 4.1   CL 99   CO2 23   BUN 43*   CREATININE 1.97*   CALCIUM 7.5*       All pertinent labs within the past 24 hours have been reviewed.    Significant Imaging:  I have reviewed all pertinent imaging results/findings within the past 24 hours.

## 2022-02-03 NOTE — ASSESSMENT & PLAN NOTE
- 12/14 intubated, tracheostomy 1/4  Weaning waxes and wanes     1/27- continue to increase as tolerated. Currently doing 4 hours SIMV with reduced rate of 4. - will transition his fentanyl infusion to a patch of 50mcg today    Continue weaning from the vent  1/31- plan for 5 hours tid today. Continue to increase as tolerated  2/1- He was not able to complete trial last night. We will continue with current plan and try again today  2/2- had issues yesterday due to tachycardia and anxiety. I have added ativan prn  2/3 was not able to do any trials yesterday. I am going to rest on the ventilator today

## 2022-02-03 NOTE — ASSESSMENT & PLAN NOTE
- H/H down to 5.5/16 - pt had bleeding from his HD insertion site yesterday this has now resolved.   - will transfuse 2 units PRBCs on HD  1/2- 9.7/27.5 - repeat CBC in AM  1/4- Hgb is 7.2. Not sure if he will be transfused during surgery. IF not we will try to get  Him transfused with next HD  01/06/2021 transfuse 1 unit of packed red blood cells today  01/10/2021 needs a unit of blood  1/11- repeat H&H in am. Continue to transfuse as needed.  1/13- Hgb is 5. Transfusing today. Post transfusion H&H  1/14- CBC pending for today  1/15- 6/18 - hemodynamically stable, recheck in AM   1/16-- 6.1/17.1 - critical low blood shortage in hospital - holding transfusion until next HD since he is hemodynamically stable and not actively bleeding   01/17/2021 day transfuse blood on dialysis today  01/18/2020 to hemoglobin up to 9 after transfusion  01/25/2022 no signs of bleeding most recent hemoglobin 8  01/29/2022 patient will need transfusion today  1/31- Hgb 6.7 yesterday. Can transfuse with next HD session  2/2- H&H is pending  2/3- plan to transfuse 1 unit of PRBC's

## 2022-02-03 NOTE — PROGRESS NOTES
Progress Note                                                           Infectious disease   Admit Date: 12/23/2021    SUBJECTIVE:     Follow-up For:  Denice gangrene    HPI/Interval history:  Patient appears to be doing better overall, less endotracheal secretions.  He has not had any fever.  The requiring mechanical ventilation.  Body edema improving.      Review of Systems:    Unable to obtain as he is on the vent.      OBJECTIVE:     Vital Signs Range (Last 24H):  Temp:  [98.4 °F (36.9 °C)-99.7 °F (37.6 °C)]   Pulse:  []   Resp:  [12-40]   BP: ()/(49-84)   SpO2:  [98 %-100 %]     Physical Exam   Constitutional: Pt was awake when I saw him  Head: Normocephalic, scarring about right orbit from old injury.   Eyes, nose and throat:  Pale moist mucosa, anicteric and acyanotic, enucleated right eye, unable to examine mouth as not able to get it open  Neck: Neck with tracheostomy without significant drainage  Cardiovascular: Normal rate and regular rhythm.  S1 and S2 appreciated by ascultation, no murmurs  Pulmonary/Chest: Effort normal, no crepitations or wheezes auscultated   Abdomen:  Wound VAC to large midline wound, colostomy present, not distended, normal bowel sounds.  Wounds in groin/buttock regions also better.  Extremities:  Generalized body edema  much improved  Skin:  Blister to dorsum of right hand starting to dry a little    Laboratory:  CBC:   Recent Labs   Lab 02/03/22  0824   WBC 14.88*   RBC 1.88*   HGB 5.7*   HCT 16.9*      MCV 89.9   MCH 30.3   MCHC 33.7     BMP:   Recent Labs   Lab 01/31/22  0629 01/31/22  0629 02/03/22  0824   *   < > 172*   *   < > 138   K 4.4   < > 4.1   CL 98   < > 99   CO2 21   < > 23   BUN 73*   < > 43*   CREATININE 3.22*   < > 1.97*   CALCIUM 8.0*   < > 7.5*   MG 2.2  --   --     < > = values in this interval not displayed.     CMP:   Recent Labs   Lab 01/31/22  0629 01/31/22  0629 02/03/22  0824   *   < > 172*   CALCIUM 8.0*   < >  7.5*   ALBUMIN 0.9*  --   --    PROT 7.1  --   --    *   < > 138   K 4.4   < > 4.1   CO2 21   < > 23   CL 98   < > 99   BUN 73*   < > 43*   CREATININE 3.22*   < > 1.97*   ALKPHOS 337*  --   --    ALT 44  --   --    AST 65*  --   --    BILITOT 0.5  --   --     < > = values in this interval not displayed.     Microbiology Results (last 7 days)     ** No results found for the last 168 hours. 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 Necrotizing fasciitis involving perineum and extending up into pelvis and abdomen admitted originally in mid December.  Infection was polymicrobial including several bacteria along with mucor spp.  His wounds are much better overall, does not appear to be any more active infection at this time.  2. Multi organ failure due to above, on mechanical ventilation, requiring hemodialysis  3. Vent associated pneumonia caused by Pseudomonas, clinically much improved.   4. Persisting severe leukocytosis much improved today  5. History of diabetes        PLAN:  Will discontinue amphotericin B today - patient has been on this for the last 4 weeks.  Communicated with Dr. Cazares (surgeon) and  he is in agreement with the discontinuation.  Will discontinue combination antibiotic therapy for the pneumonia as well as he is much better after 14 days of this treatment.    I will sign off now.  Call again p.r.n..

## 2022-02-03 NOTE — ASSESSMENT & PLAN NOTE
Dialyzed yesterday  1/31/2022 Continue with scheduled hemodialysis.  2/1/2022 Continue with dialysis for this patient.  2/2/2022 Dialysis sessions are going well.  3/3/2022 Dialysis as scheduled for this patient.

## 2022-02-03 NOTE — PLAN OF CARE
Problem: Adult Inpatient Plan of Care  Goal: Plan of Care Review  Outcome: Ongoing, Progressing     Problem: Bleeding (Sepsis/Septic Shock)  Goal: Absence of Bleeding  Outcome: Ongoing, Progressing     Problem: Infection Progression (Sepsis/Septic Shock)  Goal: Absence of Infection Signs and Symptoms  Outcome: Ongoing, Progressing     Problem: Fluid and Electrolyte Imbalance (Acute Kidney Injury/Impairment)  Goal: Fluid and Electrolyte Balance  Outcome: Ongoing, Progressing     Problem: Renal Function Impairment (Acute Kidney Injury/Impairment)  Goal: Effective Renal Function  Outcome: Ongoing, Progressing     Problem: Fall Injury Risk  Goal: Absence of Fall and Fall-Related Injury  Outcome: Ongoing, Progressing     Problem: Skin Injury Risk Increased  Goal: Skin Health and Integrity  Outcome: Ongoing, Progressing

## 2022-02-04 NOTE — ASSESSMENT & PLAN NOTE
- H/H down to 5.5/16 - pt had bleeding from his HD insertion site yesterday this has now resolved.   - will transfuse 2 units PRBCs on HD  1/2- 9.7/27.5 - repeat CBC in AM  1/4- Hgb is 7.2. Not sure if he will be transfused during surgery. IF not we will try to get  Him transfused with next HD  01/06/2021 transfuse 1 unit of packed red blood cells today  01/10/2021 needs a unit of blood  1/11- repeat H&H in am. Continue to transfuse as needed.  1/13- Hgb is 5. Transfusing today. Post transfusion H&H  1/14- CBC pending for today  1/15- 6/18 - hemodynamically stable, recheck in AM   1/16-- 6.1/17.1 - critical low blood shortage in hospital - holding transfusion until next HD since he is hemodynamically stable and not actively bleeding   01/17/2021 day transfuse blood on dialysis today  01/18/2020 to hemoglobin up to 9 after transfusion  01/25/2022 no signs of bleeding most recent hemoglobin 8  01/29/2022 patient will need transfusion today  1/31- Hgb 6.7 yesterday. Can transfuse with next HD session  2/2- H&H is pending  2/3- plan to transfuse 1 unit of PRBC's  2/4 Hgb is 6.9 today. We will transfuse another unit today

## 2022-02-04 NOTE — ASSESSMENT & PLAN NOTE
- 12/14 intubated, tracheostomy 1/4  Weaning waxes and wanes     1/27- continue to increase as tolerated. Currently doing 4 hours SIMV with reduced rate of 4. - will transition his fentanyl infusion to a patch of 50mcg today    Continue weaning from the vent  1/31- plan for 5 hours tid today. Continue to increase as tolerated  2/1- He was not able to complete trial last night. We will continue with current plan and try again today  2/2- had issues yesterday due to tachycardia and anxiety. I have added ativan prn  2/3 was not able to do any trials yesterday. I am going to rest on the ventilator today  2/4 plan to resume some trials following HD today

## 2022-02-04 NOTE — SUBJECTIVE & OBJECTIVE
Interval History: No acute changes.  No fevers or chills.  Metabolics are acceptable.    Review of patient's allergies indicates:  No Known Allergies  Current Facility-Administered Medications   Medication Frequency    0.9%  NaCl infusion (for blood administration) Q24H PRN    0.9%  NaCl infusion PRN    0.9%  NaCl infusion PRN    acetaminophen tablet 1,000 mg Q6H PRN    acetaminophen tablet 500 mg Q6H PRN    albuterol nebulizer solution 2.5 mg Q4H PRN    bisacodyL EC tablet 10 mg Daily PRN    calcium gluconate 1 g in dextrose 5 % in water (D5W) 5 % 100 mL IVPB (MB+) Once    dextromethorphan-guaiFENesin  mg/5 ml liquid 10 mL Q6H PRN    dextrose 50% injection 12.5 g PRN    diltiaZEM tablet 60 mg Q6H    fentaNYL 50 mcg/hr 1 patch Q72H    gentamicin - pharmacy to dose pharmacy to manage frequency    glucagon (human recombinant) injection 1 mg PRN    heparin (porcine) injection 4,000 Units PRN    heparin (porcine) injection 5,000 Units Q8H    insulin aspart U-100 injection 1-10 Units Q6H    lorazepam injection 2 mg Q6H PRN    metoprolol injection 5 mg Q5 Min PRN    mupirocin 2 % ointment BID    NORepinephrine 32 mg in dextrose 5 % 250 mL infusion PRN    ondansetron injection 8 mg Q6H PRN    pantoprazole suspension 40 mg Daily    sevelamer carbonate pwpk 0.8 g TID WM    simethicone chewable tablet 80 mg TID PRN    ticagrelor tablet 90 mg BID    traZODone tablet 50 mg Nightly PRN       Objective:     Vital Signs (Most Recent):  Temp: 98.3 °F (36.8 °C) (02/04/22 0300)  Pulse: 90 (02/04/22 0600)  Resp: (!) 30 (02/04/22 0600)  BP: 128/71 (02/04/22 0600)  SpO2: 100 % (02/04/22 0600)  O2 Device (Oxygen Therapy): ventilator (02/02/22 1415) Vital Signs (24h Range):  Temp:  [98.2 °F (36.8 °C)-99 °F (37.2 °C)] 98.3 °F (36.8 °C)  Pulse:  [] 90  Resp:  [15-36] 30  SpO2:  [97 %-100 %] 100 %  BP: ()/(55-77) 128/71     Weight: 101.4 kg (223 lb 9.6 oz) (02/03/22 0624)  Body mass index is 31.19  kg/m².  Body surface area is 2.25 meters squared.    I/O last 3 completed shifts:  In: 1050 [Other:500; IV Piggyback:550]  Out: 3100 [Other:2500; Stool:600]    Physical Exam  Vitals reviewed.   HENT:      Head: Normocephalic.   Cardiovascular:      Rate and Rhythm: Regular rhythm.      Heart sounds: Normal heart sounds.   Pulmonary:      Effort: Pulmonary effort is normal.      Breath sounds: Normal breath sounds.      Comments: The patient is ventilated  Abdominal:      General: Bowel sounds are normal.      Palpations: Abdomen is soft.   Neurological:      Mental Status: He is alert.         Significant Labs:  BMP:   Recent Labs   Lab 01/31/22  0629 02/03/22  0824 02/04/22  0511   *   < > 178*   *   < > 136   K 4.4   < > 3.6   CL 98   < > 100   CO2 21   < > 24   BUN 73*   < > 53*   CREATININE 3.22*   < > 2.43*   CALCIUM 8.0*   < > 7.4*   MG 2.2  --   --     < > = values in this interval not displayed.     CBC:   Recent Labs   Lab 02/04/22  0511   WBC 14.69*   RBC 2.28*   HGB 6.9*   HCT 20.0*      MCV 87.7   MCH 30.3   MCHC 34.5        Significant Imaging:  Labs: Reviewed

## 2022-02-04 NOTE — PROGRESS NOTES
[facility]  Pulmonology  Progress Note    Patient Name: Og Garcia  MRN: [id number]  Admission Date: 12/23/2021  Hospital Length of Stay: 43 days  Code Status: Prior  Attending Provider: Cecil Abernathy DO  Primary Care Provider: Hector Arndt DNP, FNP-C   Principal Problem: Denice gangrene    Subjective:     Interval History: No acute events overnight. Currently hemodynamically stable. Oxygenating adequately on the ventilator. Currently afebrile.    Objective:     Vital Signs (Most Recent):  Temp: 98.7 °F (37.1 °C) (02/04/22 0715)  Pulse: 94 (02/04/22 1115)  Resp: (!) 21 (02/04/22 1115)  BP: 116/69 (02/04/22 1115)  SpO2: 100 % (02/04/22 1115) Vital Signs (24h Range):  Temp:  [98.2 °F (36.8 °C)-99 °F (37.2 °C)] 98.7 °F (37.1 °C)  Pulse:  [] 94  Resp:  [15-38] 21  SpO2:  [97 %-100 %] 100 %  BP: ()/(55-93) 116/69     Weight: 101.4 kg (223 lb 9.6 oz)  Body mass index is 31.19 kg/m².      Intake/Output Summary (Last 24 hours) at 2/4/2022 1126  Last data filed at 2/4/2022 0600  Gross per 24 hour   Intake 1610.33 ml   Output 200 ml   Net 1410.33 ml       Physical Exam  Vitals reviewed.   Constitutional:       General: He is not in acute distress.     Appearance: He is ill-appearing.      Comments: Chronically ill appearing   HENT:      Head: Normocephalic and atraumatic.      Right Ear: External ear normal.      Left Ear: External ear normal.      Nose: Nose normal.      Mouth/Throat:      Mouth: Mucous membranes are moist.      Comments: trach  Eyes:      Conjunctiva/sclera: Conjunctivae normal.      Pupils: Pupils are equal, round, and reactive to light.   Cardiovascular:      Rate and Rhythm: Regular rhythm. Tachycardia present.      Pulses: Normal pulses.      Heart sounds: Normal heart sounds.   Pulmonary:      Effort: No respiratory distress.      Comments: clear breath sounds anteriorly  Abdominal:      Palpations: Abdomen is soft.   Musculoskeletal:          General: Normal range of motion.      Cervical back: Neck supple.      Right lower leg: Edema present.      Left lower leg: Edema present.   Lymphadenopathy:      Cervical: No cervical adenopathy.   Skin:     General: Skin is warm and dry.   Neurological:      Cranial Nerves: No cranial nerve deficit.      Comments: Remains on mechanical ventilation. Will track at times         Vents:  Vent Mode: A/C (02/04/22 0631)  Set Rate: 16 BPM (02/04/22 0631)  Vt Set: 480 mL (02/04/22 0631)  Pressure Support: 15 cmH20 (02/02/22 0400)  PEEP/CPAP: 5 cmH20 (02/04/22 0631)  Oxygen Concentration (%): 40 (02/04/22 0345)  Peak Airway Pressure: 18 cmH2O (02/04/22 0631)  Total Ve: 11.9 mL (02/04/22 0631)  F/VT Ratio<105 (RSBI): (!) 46.15 (02/02/22 0400)    Lines/Drains/Airways     Peripherally Inserted Central Catheter Line            PICC Double Lumen 01/05/22 left basilic 30 days          Central Venous Catheter Line                 Hemodialysis Catheter 12/30/21 0900 right internal jugular 36 days          Drain                 NG/OG Tube [place] sump 18 Fr. Left nostril -- days         Colostomy 12/18/21 1030 Descending/sigmoid LUQ 48 days          Airway                 Surgical Airway 01/04/22 [place] 31 days                Significant Labs:    CBC/Anemia Profile:  Recent Labs   Lab 02/03/22  0824 02/04/22  0511   WBC 14.88* 14.69*   HGB 5.7* 6.9*   HCT 16.9* 20.0*    251   MCV 89.9 87.7   RDW 15.7* 15.5*        Chemistries:  Recent Labs   Lab 02/03/22  0824 02/04/22  0511    136   K 4.1 3.6   CL 99 100   CO2 23 24   BUN 43* 53*   CREATININE 1.97* 2.43*   CALCIUM 7.5* 7.4*       All pertinent labs within the past 24 hours have been reviewed.    Significant Imaging:  I have reviewed all pertinent imaging results/findings within the past 24 hours.    Assessment/Plan:     * Denice gangrene  - s/p multiple debridements  - wound vac in place  - ID consulted for antibiotic management: He was treated with rocephin,  daptomycin, clindamycin. 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 Amphotericin started on 1/7. The patient went to OR on 1/10 for debridement. Surgery note reviewed and appreciated.  1/12- back to OR today.  1/13- OR note reviewed. No purulence noted.    1/27- wound vac in place   2/1- seems to be improving    Disseminated mucormycosis  Defer to Dr. Hall on all antibiotic choices---Her note has been reviewed and is appreciated.     Amphotericin until 02/03   zosyn and gent for pseudomonal double coverage until 02/01 2/4 treatment completed    Type 2 diabetes mellitus without complication, without long-term current use of insulin  - continue basal/bolus insulin  -accuchecks are reasonable currently      Acute blood loss anemia  - H/H down to 5.5/16 - pt had bleeding from his HD insertion site yesterday this has now resolved.   - will transfuse 2 units PRBCs on HD  1/2- 9.7/27.5 - repeat CBC in AM  1/4- Hgb is 7.2. Not sure if he will be transfused during surgery. IF not we will try to get  Him transfused with next HD  01/06/2021 transfuse 1 unit of packed red blood cells today  01/10/2021 needs a unit of blood  1/11- repeat H&H in am. Continue to transfuse as needed.  1/13- Hgb is 5. Transfusing today. Post transfusion H&H  1/14- CBC pending for today  1/15- 6/18 - hemodynamically stable, recheck in AM   1/16-- 6.1/17.1 - critical low blood shortage in hospital - holding transfusion until next HD since he is hemodynamically stable and not actively bleeding   01/17/2021 day transfuse blood on dialysis  today  01/18/2020 to hemoglobin up to 9 after transfusion  01/25/2022 no signs of bleeding most recent hemoglobin 8  01/29/2022 patient will need transfusion today  1/31- Hgb 6.7 yesterday. Can transfuse with next HD session  2/2- H&H is pending  2/3- plan to transfuse 1 unit of PRBC's  2/4 Hgb is 6.9 today. We will transfuse another unit today    RAHAT (acute kidney injury)  - tunneled HD line placement 12/30  - continue with dialysis per nephrology              On mechanically assisted ventilation  - 12/14 intubated, tracheostomy 1/4  Weaning waxes and wanes     1/27- continue to increase as tolerated. Currently doing 4 hours SIMV with reduced rate of 4. - will transition his fentanyl infusion to a patch of 50mcg today    Continue weaning from the vent  1/31- plan for 5 hours tid today. Continue to increase as tolerated  2/1- He was not able to complete trial last night. We will continue with current plan and try again today  2/2- had issues yesterday due to tachycardia and anxiety. I have added ativan prn  2/3 was not able to do any trials yesterday. I am going to rest on the ventilator today  2/4 plan to resume some trials following HD today    Hypertension  Currently hypotensive. Holding any antihypertensives  BP looks a little better this am. Weaning pressor as tolerated  1/21- No longer on pressor.   1/31- hemodynamically stable  2/4- BP looks ok                 Cecil Abernathy, DO  Pulmonology  [facility]

## 2022-02-04 NOTE — ASSESSMENT & PLAN NOTE
Currently hypotensive. Holding any antihypertensives  BP looks a little better this am. Weaning pressor as tolerated  1/21- No longer on pressor.   1/31- hemodynamically stable  2/4- BP looks ok

## 2022-02-04 NOTE — ASSESSMENT & PLAN NOTE
Defer to Dr. Hall on all antibiotic choices---Her note has been reviewed and is appreciated.     Amphotericin until 02/03   zosyn and gent for pseudomonal double coverage until 02/01 2/4 treatment completed

## 2022-02-04 NOTE — SUBJECTIVE & OBJECTIVE
Interval History: No acute events overnight. Currently hemodynamically stable. Oxygenating adequately on the ventilator. Currently afebrile.    Objective:     Vital Signs (Most Recent):  Temp: 98.7 °F (37.1 °C) (02/04/22 0715)  Pulse: 94 (02/04/22 1115)  Resp: (!) 21 (02/04/22 1115)  BP: 116/69 (02/04/22 1115)  SpO2: 100 % (02/04/22 1115) Vital Signs (24h Range):  Temp:  [98.2 °F (36.8 °C)-99 °F (37.2 °C)] 98.7 °F (37.1 °C)  Pulse:  [] 94  Resp:  [15-38] 21  SpO2:  [97 %-100 %] 100 %  BP: ()/(55-93) 116/69     Weight: 101.4 kg (223 lb 9.6 oz)  Body mass index is 31.19 kg/m².      Intake/Output Summary (Last 24 hours) at 2/4/2022 1126  Last data filed at 2/4/2022 0600  Gross per 24 hour   Intake 1610.33 ml   Output 200 ml   Net 1410.33 ml       Physical Exam  Vitals reviewed.   Constitutional:       General: He is not in acute distress.     Appearance: He is ill-appearing.      Comments: Chronically ill appearing   HENT:      Head: Normocephalic and atraumatic.      Right Ear: External ear normal.      Left Ear: External ear normal.      Nose: Nose normal.      Mouth/Throat:      Mouth: Mucous membranes are moist.      Comments: trach  Eyes:      Conjunctiva/sclera: Conjunctivae normal.      Pupils: Pupils are equal, round, and reactive to light.   Cardiovascular:      Rate and Rhythm: Regular rhythm. Tachycardia present.      Pulses: Normal pulses.      Heart sounds: Normal heart sounds.   Pulmonary:      Effort: No respiratory distress.      Comments: clear breath sounds anteriorly  Abdominal:      Palpations: Abdomen is soft.   Musculoskeletal:         General: Normal range of motion.      Cervical back: Neck supple.      Right lower leg: Edema present.      Left lower leg: Edema present.   Lymphadenopathy:      Cervical: No cervical adenopathy.   Skin:     General: Skin is warm and dry.   Neurological:      Cranial Nerves: No cranial nerve deficit.      Comments: Remains on mechanical ventilation.  Will track at times         Vents:  Vent Mode: A/C (02/04/22 0631)  Set Rate: 16 BPM (02/04/22 0631)  Vt Set: 480 mL (02/04/22 0631)  Pressure Support: 15 cmH20 (02/02/22 0400)  PEEP/CPAP: 5 cmH20 (02/04/22 0631)  Oxygen Concentration (%): 40 (02/04/22 0345)  Peak Airway Pressure: 18 cmH2O (02/04/22 0631)  Total Ve: 11.9 mL (02/04/22 0631)  F/VT Ratio<105 (RSBI): (!) 46.15 (02/02/22 0400)    Lines/Drains/Airways     Peripherally Inserted Central Catheter Line            PICC Double Lumen 01/05/22 left basilic 30 days          Central Venous Catheter Line                 Hemodialysis Catheter 12/30/21 0900 right internal jugular 36 days          Drain                 NG/OG Tube [place] sump 18 Fr. Left nostril -- days         Colostomy 12/18/21 1030 Descending/sigmoid LUQ 48 days          Airway                 Surgical Airway 01/04/22 [place] 31 days                Significant Labs:    CBC/Anemia Profile:  Recent Labs   Lab 02/03/22  0824 02/04/22  0511   WBC 14.88* 14.69*   HGB 5.7* 6.9*   HCT 16.9* 20.0*    251   MCV 89.9 87.7   RDW 15.7* 15.5*        Chemistries:  Recent Labs   Lab 02/03/22  0824 02/04/22  0511    136   K 4.1 3.6   CL 99 100   CO2 23 24   BUN 43* 53*   CREATININE 1.97* 2.43*   CALCIUM 7.5* 7.4*       All pertinent labs within the past 24 hours have been reviewed.    Significant Imaging:  I have reviewed all pertinent imaging results/findings within the past 24 hours.

## 2022-02-04 NOTE — PROGRESS NOTES
[facility]  Nephrology  Progress Note    Patient Name: Og Garcia  MRN: [id number]  Admission Date: 12/23/2021  Hospital Length of Stay: 43 days  Attending Provider: Cecil Abernathy DO   Primary Care Physician: Hector Arndt DNP, FNP-C  Principal Problem:Denice gangrene    Subjective:     HPI: The patient is known from the previous nephrology consult at [facility].  He is no at Specialty and continues to need dialysis support.      Interval History: No acute changes.  No fevers or chills.  Metabolics are acceptable.    Review of patient's allergies indicates:  No Known Allergies  Current Facility-Administered Medications   Medication Frequency    0.9%  NaCl infusion (for blood administration) Q24H PRN    0.9%  NaCl infusion PRN    0.9%  NaCl infusion PRN    acetaminophen tablet 1,000 mg Q6H PRN    acetaminophen tablet 500 mg Q6H PRN    albuterol nebulizer solution 2.5 mg Q4H PRN    bisacodyL EC tablet 10 mg Daily PRN    calcium gluconate 1 g in dextrose 5 % in water (D5W) 5 % 100 mL IVPB (MB+) Once    dextromethorphan-guaiFENesin  mg/5 ml liquid 10 mL Q6H PRN    dextrose 50% injection 12.5 g PRN    diltiaZEM tablet 60 mg Q6H    fentaNYL 50 mcg/hr 1 patch Q72H    gentamicin - pharmacy to dose pharmacy to manage frequency    glucagon (human recombinant) injection 1 mg PRN    heparin (porcine) injection 4,000 Units PRN    heparin (porcine) injection 5,000 Units Q8H    insulin aspart U-100 injection 1-10 Units Q6H    lorazepam injection 2 mg Q6H PRN    metoprolol injection 5 mg Q5 Min PRN    mupirocin 2 % ointment BID    NORepinephrine 32 mg in dextrose 5 % 250 mL infusion PRN    ondansetron injection 8 mg Q6H PRN    pantoprazole suspension 40 mg Daily    sevelamer carbonate pwpk 0.8 g TID WM    simethicone chewable tablet 80 mg TID PRN    ticagrelor tablet 90 mg BID    traZODone tablet 50 mg Nightly PRN       Objective:     Vital Signs (Most  Recent):  Temp: 98.3 °F (36.8 °C) (02/04/22 0300)  Pulse: 90 (02/04/22 0600)  Resp: (!) 30 (02/04/22 0600)  BP: 128/71 (02/04/22 0600)  SpO2: 100 % (02/04/22 0600)  O2 Device (Oxygen Therapy): ventilator (02/02/22 1415) Vital Signs (24h Range):  Temp:  [98.2 °F (36.8 °C)-99 °F (37.2 °C)] 98.3 °F (36.8 °C)  Pulse:  [] 90  Resp:  [15-36] 30  SpO2:  [97 %-100 %] 100 %  BP: ()/(55-77) 128/71     Weight: 101.4 kg (223 lb 9.6 oz) (02/03/22 0624)  Body mass index is 31.19 kg/m².  Body surface area is 2.25 meters squared.    I/O last 3 completed shifts:  In: 1050 [Other:500; IV Piggyback:550]  Out: 3100 [Other:2500; Stool:600]    Physical Exam  Vitals reviewed.   HENT:      Head: Normocephalic.   Cardiovascular:      Rate and Rhythm: Regular rhythm.      Heart sounds: Normal heart sounds.   Pulmonary:      Effort: Pulmonary effort is normal.      Breath sounds: Normal breath sounds.      Comments: The patient is ventilated  Abdominal:      General: Bowel sounds are normal.      Palpations: Abdomen is soft.   Neurological:      Mental Status: He is alert.         Significant Labs:  BMP:   Recent Labs   Lab 01/31/22  0629 02/03/22  0824 02/04/22  0511   *   < > 178*   *   < > 136   K 4.4   < > 3.6   CL 98   < > 100   CO2 21   < > 24   BUN 73*   < > 53*   CREATININE 3.22*   < > 2.43*   CALCIUM 8.0*   < > 7.4*   MG 2.2  --   --     < > = values in this interval not displayed.     CBC:   Recent Labs   Lab 02/04/22  0511   WBC 14.69*   RBC 2.28*   HGB 6.9*   HCT 20.0*      MCV 87.7   MCH 30.3   MCHC 34.5        Significant Imaging:  Labs: Reviewed    Assessment/Plan:     RAHAT (acute kidney injury)  Dialyzed yesterday  1/31/2022 Continue with scheduled hemodialysis.  2/1/2022 Continue with dialysis for this patient.  2/2/2022 Dialysis sessions are going well.  2/3/2022 Dialysis as scheduled for this patient.  2/4/2022 The patient is doing well with dialysis.  Metabolics are stable.    On mechanically  assisted ventilation  Wean as tolerated  Per ICU team with ventilator management        Thank you for your consult. I will follow-up with patient. Please contact us if you have any additional questions.    Edwin Pryor Jr, MD  Nephrology  [facility]

## 2022-02-04 NOTE — ASSESSMENT & PLAN NOTE
Dialyzed yesterday  1/31/2022 Continue with scheduled hemodialysis.  2/1/2022 Continue with dialysis for this patient.  2/2/2022 Dialysis sessions are going well.  2/3/2022 Dialysis as scheduled for this patient.  2/4/2022 The patient is doing well with dialysis.  Metabolics are stable.

## 2022-02-04 NOTE — PLAN OF CARE
Problem: Adult Inpatient Plan of Care  Goal: Plan of Care Review  Outcome: Ongoing, Progressing     Problem: Glycemic Control Impaired (Sepsis/Septic Shock)  Goal: Blood Glucose Level Within Desired Range  Outcome: Ongoing, Progressing     Problem: Infection Progression (Sepsis/Septic Shock)  Goal: Absence of Infection Signs and Symptoms  Outcome: Ongoing, Progressing     Problem: Renal Function Impairment (Acute Kidney Injury/Impairment)  Goal: Effective Renal Function  Outcome: Ongoing, Progressing     Problem: Fall Injury Risk  Goal: Absence of Fall and Fall-Related Injury  Outcome: Ongoing, Progressing

## 2022-02-05 NOTE — PROGRESS NOTES
[facility]  Nephrology  Progress Note    Patient Name: Og Garcia  MRN: [id number]  Admission Date: 12/23/2021  Hospital Length of Stay: 44 days  Attending Provider: Cecil Abernathy DO   Primary Care Physician: Hector Arndt DNP, FNP-C  Principal Problem:Denice gangrene    Consults  Subjective:     Interval History: Mr. Garcia remains ventilated. Dialyzed yesterday.     Review of patient's allergies indicates:  No Known Allergies  Current Facility-Administered Medications   Medication Frequency    0.9%  NaCl infusion (for blood administration) Q24H PRN    0.9%  NaCl infusion PRN    0.9%  NaCl infusion PRN    acetaminophen tablet 1,000 mg Q6H PRN    acetaminophen tablet 500 mg Q6H PRN    albuterol nebulizer solution 2.5 mg Q4H PRN    bisacodyL EC tablet 10 mg Daily PRN    calcium gluconate 1 g in dextrose 5 % in water (D5W) 5 % 100 mL IVPB (MB+) Once    dextromethorphan-guaiFENesin  mg/5 ml liquid 10 mL Q6H PRN    dextrose 50% injection 12.5 g PRN    diltiaZEM tablet 60 mg Q6H    fentaNYL 50 mcg/hr 1 patch Q72H    glucagon (human recombinant) injection 1 mg PRN    guaiFENesin 100 mg/5 ml syrup 200 mg Q4H    heparin (porcine) injection 4,000 Units PRN    heparin (porcine) injection 5,000 Units Q8H    insulin aspart U-100 injection 1-10 Units Q6H    lorazepam injection 2 mg Q6H PRN    metoprolol injection 5 mg Q5 Min PRN    NORepinephrine 32 mg in dextrose 5 % 250 mL infusion PRN    ondansetron injection 8 mg Q6H PRN    pantoprazole suspension 40 mg Daily    sevelamer carbonate pwpk 0.8 g TID WM    simethicone chewable tablet 80 mg TID PRN    ticagrelor tablet 90 mg BID    traZODone tablet 50 mg Nightly PRN       Objective:     Vital Signs (Most Recent):  Temp: 98.4 °F (36.9 °C) (02/05/22 0700)  Pulse: 90 (02/05/22 0900)  Resp: 17 (02/05/22 0900)  BP: 122/70 (02/05/22 0900)  SpO2: 100 % (02/05/22 0900)  O2 Device (Oxygen Therapy): ventilator (02/05/22  0935) Vital Signs (24h Range):  Temp:  [98.2 °F (36.8 °C)-99.7 °F (37.6 °C)] 98.4 °F (36.9 °C)  Pulse:  [82-99] 90  Resp:  [16-39] 17  SpO2:  [98 %-100 %] 100 %  BP: (107-133)/(54-79) 122/70     Weight: 102.3 kg (225 lb 8.5 oz) (02/05/22 0400)  Body mass index is 31.46 kg/m².  Body surface area is 2.26 meters squared.    I/O last 3 completed shifts:  In: 4333.7 [I.V.:20; Blood:993.7; Other:100; NG/GT:3220]  Out: 2800 [Other:2500; Stool:300]    Physical Exam On vent. Eyes open. NO response to questions    Significant Labs:sure  BMP:   Recent Labs   Lab 01/31/22  0629 02/03/22  0824 02/05/22  0506   *   < > 147*   *   < > 138   K 4.4   < > 3.8   CL 98   < > 101   CO2 21   < > 24   BUN 73*   < > 40*   CREATININE 3.22*   < > 1.92*   CALCIUM 8.0*   < > 7.7*   MG 2.2  --   --     < > = values in this interval not displayed.     All labs within the past 24 hours have been reviewed.    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] 01/17/2022 Unknown    Hyperphosphatemia [E83.39] 01/07/2022 No    Acute blood loss anemia [D62] 12/25/2021 No    Type 2 diabetes mellitus without complication, without long-term current use of insulin [E11.9] 12/25/2021 Yes    Necrotizing fasciitis [M72.6]  Yes    Hypocalcemia [E83.51] 12/16/2021 Yes    RAHAT (acute kidney injury) [N17.9] 12/14/2021 Yes    On mechanically assisted ventilation [Z99.11] 12/14/2021 Not Applicable    Hypertension [I10] 09/02/2021 Yes      Problems Resolved During this Admission:    Diagnosis Date Noted Date Resolved POA    Ventilator associated pneumonia [J95.851] 01/09/2022 01/27/2022 No    Shock [R57.9] 01/08/2022 01/16/2022 Yes       Continue dialysis as per schedule    Thank you for your consult. I will follow-up with patient. Please contact us if you have any additional  questions.    Theo Coe MD  Nephrology  [facility]

## 2022-02-05 NOTE — PLAN OF CARE
Problem: Adult Inpatient Plan of Care  Goal: Optimal Comfort and Wellbeing  Outcome: Ongoing, Progressing     Problem: Adjustment to Illness (Sepsis/Septic Shock)  Goal: Optimal Coping  Outcome: Ongoing, Progressing     Problem: Oral Intake Inadequate (Acute Kidney Injury/Impairment)  Goal: Optimal Nutrition Intake  Outcome: Ongoing, Progressing     Problem: Infection  Goal: Absence of Infection Signs and Symptoms  Outcome: Ongoing, Progressing     Problem: Adult Inpatient Plan of Care  Goal: Plan of Care Review  Outcome: Ongoing, Not Progressing  Goal: Absence of Hospital-Acquired Illness or Injury  Outcome: Ongoing, Not Progressing  Goal: Readiness for Transition of Care  Outcome: Ongoing, Not Progressing     Problem: Bleeding (Sepsis/Septic Shock)  Goal: Absence of Bleeding  Outcome: Ongoing, Not Progressing     Problem: Infection Progression (Sepsis/Septic Shock)  Goal: Absence of Infection Signs and Symptoms  Outcome: Ongoing, Not Progressing     Problem: Adult Inpatient Plan of Care  Goal: Optimal Comfort and Wellbeing  Outcome: Ongoing, Progressing     Problem: Adjustment to Illness (Sepsis/Septic Shock)  Goal: Optimal Coping  Outcome: Ongoing, Progressing     Problem: Oral Intake Inadequate (Acute Kidney Injury/Impairment)  Goal: Optimal Nutrition Intake  Outcome: Ongoing, Progressing     Problem: Infection  Goal: Absence of Infection Signs and Symptoms  Outcome: Ongoing, Progressing

## 2022-02-05 NOTE — ASSESSMENT & PLAN NOTE
- s/p multiple debridements  - wound vac in place  - ID consulted for antibiotic management: He was treated with rocephin, daptomycin, clindamycin. 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 Amphotericin started on 1/7. The patient went to OR on 1/10 for debridement. Surgery note reviewed and appreciated.  1/12- back to OR today.  1/13- OR note reviewed. No purulence noted.    1/27- wound vac in place   2/1- seems to be improving  2/5 wound vac in place

## 2022-02-05 NOTE — ASSESSMENT & PLAN NOTE
- 12/14 intubated, tracheostomy 1/4  Weaning waxes and wanes     1/27- continue to increase as tolerated. Currently doing 4 hours SIMV with reduced rate of 4. - will transition his fentanyl infusion to a patch of 50mcg today    Continue weaning from the vent  1/31- plan for 5 hours tid today. Continue to increase as tolerated  2/1- He was not able to complete trial last night. We will continue with current plan and try again today  2/2- had issues yesterday due to tachycardia and anxiety. I have added ativan prn  2/3 was not able to do any trials yesterday. I am going to rest on the ventilator today  2/4 plan to resume some trials following HD today  2/5- still having issues with weaning trials. A lot of secretions. I have added mucinex.

## 2022-02-05 NOTE — ASSESSMENT & PLAN NOTE
Currently hypotensive. Holding any antihypertensives  BP looks a little better this am. Weaning pressor as tolerated  1/21- No longer on pressor.   1/31- hemodynamically stable  2/5- BP looks ok

## 2022-02-05 NOTE — PROGRESS NOTES
[facility]  Pulmonology  Progress Note    Patient Name: Og Garcia  MRN: [id number]  Admission Date: 12/23/2021  Hospital Length of Stay: 44 days  Code Status: Prior  Attending Provider: Cecil Abernathy DO  Primary Care Provider: Hector Arndt DNP, FNP-C   Principal Problem: Denice gangrene    Subjective:     Interval History: No acute events overnight. Currently hemodynamically stable. Oxygenating adequately on the ventilator. Currently afebrile. Still having issues with breathing trials.    Objective:     Vital Signs (Most Recent):  Temp: 98.4 °F (36.9 °C) (02/05/22 0700)  Pulse: 90 (02/05/22 0900)  Resp: 17 (02/05/22 0900)  BP: 122/70 (02/05/22 0900)  SpO2: 100 % (02/05/22 0900) Vital Signs (24h Range):  Temp:  [98.2 °F (36.8 °C)-99.7 °F (37.6 °C)] 98.4 °F (36.9 °C)  Pulse:  [82-99] 90  Resp:  [16-39] 17  SpO2:  [98 %-100 %] 100 %  BP: (107-133)/(54-79) 122/70     Weight: 102.3 kg (225 lb 8.5 oz)  Body mass index is 31.46 kg/m².      Intake/Output Summary (Last 24 hours) at 2/5/2022 1053  Last data filed at 2/5/2022 0600  Gross per 24 hour   Intake 2723.33 ml   Output 2500 ml   Net 223.33 ml       Physical Exam  Vitals reviewed.   Constitutional:       General: He is not in acute distress.     Appearance: He is ill-appearing.      Comments: Chronically ill appearing   HENT:      Head: Normocephalic and atraumatic.      Right Ear: External ear normal.      Left Ear: External ear normal.      Nose: Nose normal.      Mouth/Throat:      Mouth: Mucous membranes are moist.      Comments: trach  Eyes:      Conjunctiva/sclera: Conjunctivae normal.      Pupils: Pupils are equal, round, and reactive to light.   Cardiovascular:      Rate and Rhythm: Regular rhythm. Tachycardia present.      Pulses: Normal pulses.      Heart sounds: Normal heart sounds.   Pulmonary:      Effort: No respiratory distress.      Comments: clear breath sounds anteriorly  Abdominal:      Palpations: Abdomen is  soft.   Musculoskeletal:         General: Normal range of motion.      Cervical back: Neck supple.      Right lower leg: Edema present.      Left lower leg: Edema present.   Lymphadenopathy:      Cervical: No cervical adenopathy.   Skin:     General: Skin is warm and dry.   Neurological:      Cranial Nerves: No cranial nerve deficit.      Comments: Remains on mechanical ventilation. Will track at times         Vents:  Vent Mode: A/C (02/05/22 0935)  Set Rate: 16 BPM (02/05/22 0935)  Vt Set: 480 mL (02/05/22 0935)  Pressure Support: 15 cmH20 (02/02/22 0400)  PEEP/CPAP: 5 cmH20 (02/05/22 0935)  Oxygen Concentration (%): 40 (02/05/22 0935)  Peak Airway Pressure: 24 cmH2O (02/05/22 0935)  Total Ve: 7.7 mL (02/05/22 0935)  F/VT Ratio<105 (RSBI): (!) 103.45 (02/04/22 2030)    Lines/Drains/Airways     Peripherally Inserted Central Catheter Line            PICC Double Lumen 01/05/22 left basilic 31 days          Central Venous Catheter Line                 Hemodialysis Catheter 12/30/21 0900 right internal jugular 37 days          Drain                 NG/OG Tube [place] sump 18 Fr. Left nostril -- days         Colostomy 12/18/21 1030 Descending/sigmoid LUQ 49 days          Airway                 Surgical Airway 01/04/22 [place] 32 days                Significant Labs:    CBC/Anemia Profile:  Recent Labs   Lab 02/04/22  0511 02/05/22  0336   WBC 14.69* 14.04*   HGB 6.9* 8.1*   HCT 20.0* 23.9*    292   MCV 87.7 87.9   RDW 15.5* 15.6*        Chemistries:  Recent Labs   Lab 02/04/22  0511 02/05/22  0506    138   K 3.6 3.8    101   CO2 24 24   BUN 53* 40*   CREATININE 2.43* 1.92*   CALCIUM 7.4* 7.7*       All pertinent labs within the past 24 hours have been reviewed.    Significant Imaging:  I have reviewed all pertinent imaging results/findings within the past 24 hours.    Assessment/Plan:     * Denice gangrene  - s/p multiple debridements  - wound vac in place  - ID consulted for antibiotic management: He  was treated with rocephin, daptomycin, clindamycin. 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 Amphotericin started on 1/7. The patient went to OR on 1/10 for debridement. Surgery note reviewed and appreciated.  1/12- back to OR today.  1/13- OR note reviewed. No purulence noted.    1/27- wound vac in place   2/1- seems to be improving  2/5 wound vac in place    Disseminated mucormycosis  Defer to Dr. Hall on all antibiotic choices---Her note has been reviewed and is appreciated.     Amphotericin until 02/03   zosyn and gent for pseudomonal double coverage until 02/01 2/4 treatment completed    Type 2 diabetes mellitus without complication, without long-term current use of insulin  - continue basal/bolus insulin  -accuchecks are reasonable currently      Acute blood loss anemia  - H/H down to 5.5/16 - pt had bleeding from his HD insertion site yesterday this has now resolved.   - will transfuse 2 units PRBCs on HD  1/2- 9.7/27.5 - repeat CBC in AM  1/4- Hgb is 7.2. Not sure if he will be transfused during surgery. IF not we will try to get  Him transfused with next HD  01/06/2021 transfuse 1 unit of packed red blood cells today  01/10/2021 needs a unit of blood  1/11- repeat H&H in am. Continue to transfuse as needed.  1/13- Hgb is 5. Transfusing today. Post transfusion H&H  1/14- CBC pending for today  1/15- 6/18 - hemodynamically stable, recheck in AM   1/16-- 6.1/17.1 - critical low blood shortage in hospital - holding transfusion until next HD since he is hemodynamically stable and not actively  bleeding   01/17/2021 day transfuse blood on dialysis today  01/18/2020 to hemoglobin up to 9 after transfusion  01/25/2022 no signs of bleeding most recent hemoglobin 8  01/29/2022 patient will need transfusion today  1/31- Hgb 6.7 yesterday. Can transfuse with next HD session  2/2- H&H is pending  2/3- plan to transfuse 1 unit of PRBC's  2/4 Hgb is 6.9 today. We will transfuse another unit today  2/5 8.1 now    RAHAT (acute kidney injury)  - tunneled HD line placement 12/30  - continue with dialysis per nephrology              On mechanically assisted ventilation  - 12/14 intubated, tracheostomy 1/4  Weaning waxes and wanes     1/27- continue to increase as tolerated. Currently doing 4 hours SIMV with reduced rate of 4. - will transition his fentanyl infusion to a patch of 50mcg today    Continue weaning from the vent  1/31- plan for 5 hours tid today. Continue to increase as tolerated  2/1- He was not able to complete trial last night. We will continue with current plan and try again today  2/2- had issues yesterday due to tachycardia and anxiety. I have added ativan prn  2/3 was not able to do any trials yesterday. I am going to rest on the ventilator today  2/4 plan to resume some trials following HD today  2/5- still having issues with weaning trials. A lot of secretions. I have added mucinex.    Hypertension  Currently hypotensive. Holding any antihypertensives  BP looks a little better this am. Weaning pressor as tolerated  1/21- No longer on pressor.   1/31- hemodynamically stable  2/5- BP looks ok                 Cecil Abernathy, DO  Pulmonology  [facility]

## 2022-02-05 NOTE — SUBJECTIVE & OBJECTIVE
Interval History: No acute events overnight. Currently hemodynamically stable. Oxygenating adequately on the ventilator. Currently afebrile. Still having issues with breathing trials.    Objective:     Vital Signs (Most Recent):  Temp: 98.4 °F (36.9 °C) (02/05/22 0700)  Pulse: 90 (02/05/22 0900)  Resp: 17 (02/05/22 0900)  BP: 122/70 (02/05/22 0900)  SpO2: 100 % (02/05/22 0900) Vital Signs (24h Range):  Temp:  [98.2 °F (36.8 °C)-99.7 °F (37.6 °C)] 98.4 °F (36.9 °C)  Pulse:  [82-99] 90  Resp:  [16-39] 17  SpO2:  [98 %-100 %] 100 %  BP: (107-133)/(54-79) 122/70     Weight: 102.3 kg (225 lb 8.5 oz)  Body mass index is 31.46 kg/m².      Intake/Output Summary (Last 24 hours) at 2/5/2022 1053  Last data filed at 2/5/2022 0600  Gross per 24 hour   Intake 2723.33 ml   Output 2500 ml   Net 223.33 ml       Physical Exam  Vitals reviewed.   Constitutional:       General: He is not in acute distress.     Appearance: He is ill-appearing.      Comments: Chronically ill appearing   HENT:      Head: Normocephalic and atraumatic.      Right Ear: External ear normal.      Left Ear: External ear normal.      Nose: Nose normal.      Mouth/Throat:      Mouth: Mucous membranes are moist.      Comments: trach  Eyes:      Conjunctiva/sclera: Conjunctivae normal.      Pupils: Pupils are equal, round, and reactive to light.   Cardiovascular:      Rate and Rhythm: Regular rhythm. Tachycardia present.      Pulses: Normal pulses.      Heart sounds: Normal heart sounds.   Pulmonary:      Effort: No respiratory distress.      Comments: clear breath sounds anteriorly  Abdominal:      Palpations: Abdomen is soft.   Musculoskeletal:         General: Normal range of motion.      Cervical back: Neck supple.      Right lower leg: Edema present.      Left lower leg: Edema present.   Lymphadenopathy:      Cervical: No cervical adenopathy.   Skin:     General: Skin is warm and dry.   Neurological:      Cranial Nerves: No cranial nerve deficit.       Comments: Remains on mechanical ventilation. Will track at times         Vents:  Vent Mode: A/C (02/05/22 0935)  Set Rate: 16 BPM (02/05/22 0935)  Vt Set: 480 mL (02/05/22 0935)  Pressure Support: 15 cmH20 (02/02/22 0400)  PEEP/CPAP: 5 cmH20 (02/05/22 0935)  Oxygen Concentration (%): 40 (02/05/22 0935)  Peak Airway Pressure: 24 cmH2O (02/05/22 0935)  Total Ve: 7.7 mL (02/05/22 0935)  F/VT Ratio<105 (RSBI): (!) 103.45 (02/04/22 2030)    Lines/Drains/Airways     Peripherally Inserted Central Catheter Line            PICC Double Lumen 01/05/22 left basilic 31 days          Central Venous Catheter Line                 Hemodialysis Catheter 12/30/21 0900 right internal jugular 37 days          Drain                 NG/OG Tube [place] sump 18 Fr. Left nostril -- days         Colostomy 12/18/21 1030 Descending/sigmoid LUQ 49 days          Airway                 Surgical Airway 01/04/22 [place] 32 days                Significant Labs:    CBC/Anemia Profile:  Recent Labs   Lab 02/04/22  0511 02/05/22  0336   WBC 14.69* 14.04*   HGB 6.9* 8.1*   HCT 20.0* 23.9*    292   MCV 87.7 87.9   RDW 15.5* 15.6*        Chemistries:  Recent Labs   Lab 02/04/22  0511 02/05/22  0506    138   K 3.6 3.8    101   CO2 24 24   BUN 53* 40*   CREATININE 2.43* 1.92*   CALCIUM 7.4* 7.7*       All pertinent labs within the past 24 hours have been reviewed.    Significant Imaging:  I have reviewed all pertinent imaging results/findings within the past 24 hours.

## 2022-02-05 NOTE — ASSESSMENT & PLAN NOTE
- H/H down to 5.5/16 - pt had bleeding from his HD insertion site yesterday this has now resolved.   - will transfuse 2 units PRBCs on HD  1/2- 9.7/27.5 - repeat CBC in AM  1/4- Hgb is 7.2. Not sure if he will be transfused during surgery. IF not we will try to get  Him transfused with next HD  01/06/2021 transfuse 1 unit of packed red blood cells today  01/10/2021 needs a unit of blood  1/11- repeat H&H in am. Continue to transfuse as needed.  1/13- Hgb is 5. Transfusing today. Post transfusion H&H  1/14- CBC pending for today  1/15- 6/18 - hemodynamically stable, recheck in AM   1/16-- 6.1/17.1 - critical low blood shortage in hospital - holding transfusion until next HD since he is hemodynamically stable and not actively bleeding   01/17/2021 day transfuse blood on dialysis today  01/18/2020 to hemoglobin up to 9 after transfusion  01/25/2022 no signs of bleeding most recent hemoglobin 8  01/29/2022 patient will need transfusion today  1/31- Hgb 6.7 yesterday. Can transfuse with next HD session  2/2- H&H is pending  2/3- plan to transfuse 1 unit of PRBC's  2/4 Hgb is 6.9 today. We will transfuse another unit today  2/5 8.1 now

## 2022-02-06 NOTE — PROGRESS NOTES
[facility]  Pulmonology  Progress Note    Patient Name: Og Garcia  MRN: [id number]  Admission Date: 12/23/2021  Hospital Length of Stay: 45 days  Code Status: Prior  Attending Provider: Cecil Abernathy DO  Primary Care Provider: Hector Arndt DNP, FNP-C   Principal Problem: Denice gangrene    Subjective:     Interval History: No acute events overnight. Currently hemodynamically stable. Oxygenating adequately on the ventilator. Currently afebrile. On a breathing trial this am and doing well    Objective:     Vital Signs (Most Recent):  Temp: 97.6 °F (36.4 °C) (02/06/22 0700)  Pulse: 92 (02/06/22 0700)  Resp: (!) 28 (02/06/22 0700)  BP: 125/65 (02/06/22 0700)  SpO2: 100 % (02/06/22 0700) Vital Signs (24h Range):  Temp:  [97.5 °F (36.4 °C)-99.2 °F (37.3 °C)] 97.6 °F (36.4 °C)  Pulse:  [] 92  Resp:  [16-37] 28  SpO2:  [100 %] 100 %  BP: (117-146)/(64-82) 125/65     Weight: 94.2 kg (207 lb 10.8 oz)  Body mass index is 28.96 kg/m².      Intake/Output Summary (Last 24 hours) at 2/6/2022 1012  Last data filed at 2/5/2022 1800  Gross per 24 hour   Intake 1270 ml   Output 400 ml   Net 870 ml       Physical Exam  Vitals reviewed.   Constitutional:       General: He is not in acute distress.     Appearance: He is ill-appearing.      Comments: Chronically ill appearing   HENT:      Head: Normocephalic and atraumatic.      Right Ear: External ear normal.      Left Ear: External ear normal.      Nose: Nose normal.      Mouth/Throat:      Mouth: Mucous membranes are moist.      Comments: trach  Eyes:      Conjunctiva/sclera: Conjunctivae normal.      Pupils: Pupils are equal, round, and reactive to light.   Cardiovascular:      Rate and Rhythm: Regular rhythm. Tachycardia present.      Pulses: Normal pulses.      Heart sounds: Normal heart sounds.   Pulmonary:      Effort: No respiratory distress.      Comments: clear breath sounds anteriorly  Abdominal:      Palpations: Abdomen is soft.    Musculoskeletal:         General: Normal range of motion.      Cervical back: Neck supple.      Right lower leg: Edema present.      Left lower leg: Edema present.   Lymphadenopathy:      Cervical: No cervical adenopathy.   Skin:     General: Skin is warm and dry.   Neurological:      Cranial Nerves: No cranial nerve deficit.      Comments: Remains on mechanical ventilation. Will track at times         Vents:  Vent Mode: A/C (02/06/22 0114)  Set Rate: 16 BPM (02/06/22 0114)  Vt Set: 480 mL (02/06/22 0114)  Pressure Support: 15 cmH20 (02/02/22 0400)  PEEP/CPAP: 5 cmH20 (02/06/22 0114)  Oxygen Concentration (%): 40 (02/06/22 0720)  Peak Airway Pressure: 18 cmH2O (02/06/22 0114)  Total Ve: 6.8 mL (02/06/22 0114)  F/VT Ratio<105 (RSBI): (!) 103.45 (02/04/22 2030)    Lines/Drains/Airways     Peripherally Inserted Central Catheter Line            PICC Double Lumen 01/05/22 left basilic 32 days          Central Venous Catheter Line                 Hemodialysis Catheter 12/30/21 0900 right internal jugular 38 days          Drain                 NG/OG Tube [place] sump 18 Fr. Left nostril -- days         Colostomy 12/18/21 1030 Descending/sigmoid LUQ 49 days          Airway                 Surgical Airway 01/04/22 Shiley 33 days                Significant Labs:    CBC/Anemia Profile:  Recent Labs   Lab 02/05/22  0336 02/06/22  0236   WBC 14.04* 13.85*   HGB 8.1* 8.4*   HCT 23.9* 25.4*    309   MCV 87.9 92.4   RDW 15.6* 16.3*        Chemistries:  Recent Labs   Lab 02/05/22  0506 02/06/22  0237    139   K 3.8 3.7    102   CO2 24 27   BUN 40* 50*   CREATININE 1.92* 2.14*   CALCIUM 7.7* 7.7*       All pertinent labs within the past 24 hours have been reviewed.    Significant Imaging:  I have reviewed all pertinent imaging results/findings within the past 24 hours.    Assessment/Plan:     * Denice gangrene  - s/p multiple debridements  - wound vac in place  - ID consulted for antibiotic management: He was  treated with rocephin, daptomycin, clindamycin. 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 Amphotericin started on 1/7. The patient went to OR on 1/10 for debridement. Surgery note reviewed and appreciated.  1/12- back to OR today.  1/13- OR note reviewed. No purulence noted.    1/27- wound vac in place   2/1- seems to be improving  2/5 wound vac in place  2/6- antibiotics and amphotericin completed    Disseminated mucormycosis  Defer to Dr. Hall on all antibiotic choices---Her note has been reviewed and is appreciated.     Amphotericin until 02/03   zosyn and gent for pseudomonal double coverage until 02/01 2/4 treatment completed    Type 2 diabetes mellitus without complication, without long-term current use of insulin  - continue basal/bolus insulin  -accuchecks are reasonable currently      Acute blood loss anemia  - H/H down to 5.5/16 - pt had bleeding from his HD insertion site yesterday this has now resolved.   - will transfuse 2 units PRBCs on HD  1/2- 9.7/27.5 - repeat CBC in AM  1/4- Hgb is 7.2. Not sure if he will be transfused during surgery. IF not we will try to get  Him transfused with next HD  01/06/2021 transfuse 1 unit of packed red blood cells today  01/10/2021 needs a unit of blood  1/11- repeat H&H in am. Continue to transfuse as needed.  1/13- Hgb is 5. Transfusing today. Post transfusion H&H  1/14- CBC pending for today  1/15- 6/18 - hemodynamically stable, recheck in AM   1/16-- 6.1/17.1 - critical low blood shortage in hospital - holding transfusion until next HD since he is  hemodynamically stable and not actively bleeding   01/17/2021 day transfuse blood on dialysis today  01/18/2020 to hemoglobin up to 9 after transfusion  01/25/2022 no signs of bleeding most recent hemoglobin 8  01/29/2022 patient will need transfusion today  1/31- Hgb 6.7 yesterday. Can transfuse with next HD session  2/2- H&H is pending  2/3- plan to transfuse 1 unit of PRBC's  2/4 Hgb is 6.9 today. We will transfuse another unit today  2/6 8.4 now    RAHAT (acute kidney injury)  - tunneled HD line placement 12/30  - continue with dialysis per nephrology              On mechanically assisted ventilation  - 12/14 intubated, tracheostomy 1/4  Weaning waxes and wanes     1/27- continue to increase as tolerated. Currently doing 4 hours SIMV with reduced rate of 4. - will transition his fentanyl infusion to a patch of 50mcg today    Continue weaning from the vent  1/31- plan for 5 hours tid today. Continue to increase as tolerated  2/1- He was not able to complete trial last night. We will continue with current plan and try again today  2/2- had issues yesterday due to tachycardia and anxiety. I have added ativan prn  2/3 was not able to do any trials yesterday. I am going to rest on the ventilator today  2/4 plan to resume some trials following HD today  2/5- still having issues with weaning trials. A lot of secretions. I have added mucinex.  2/6 on a trial this am and doing ok. Will continue throughout today as tolerated    Hypertension  Currently hypotensive. Holding any antihypertensives  BP looks a little better this am. Weaning pressor as tolerated  1/21- No longer on pressor.   1/31- hemodynamically stable  2/6- BP looks ok                 Cecil Abernathy, DO  Pulmonology  [facility]

## 2022-02-06 NOTE — ASSESSMENT & PLAN NOTE
- s/p multiple debridements  - wound vac in place  - ID consulted for antibiotic management: He was treated with rocephin, daptomycin, clindamycin. 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 Amphotericin started on 1/7. The patient went to OR on 1/10 for debridement. Surgery note reviewed and appreciated.  1/12- back to OR today.  1/13- OR note reviewed. No purulence noted.    1/27- wound vac in place   2/1- seems to be improving  2/5 wound vac in place  2/6- antibiotics and amphotericin completed

## 2022-02-06 NOTE — SUBJECTIVE & OBJECTIVE
Interval History: No acute events overnight. Currently hemodynamically stable. Oxygenating adequately on the ventilator. Currently afebrile. On a breathing trial this am and doing well    Objective:     Vital Signs (Most Recent):  Temp: 97.6 °F (36.4 °C) (02/06/22 0700)  Pulse: 92 (02/06/22 0700)  Resp: (!) 28 (02/06/22 0700)  BP: 125/65 (02/06/22 0700)  SpO2: 100 % (02/06/22 0700) Vital Signs (24h Range):  Temp:  [97.5 °F (36.4 °C)-99.2 °F (37.3 °C)] 97.6 °F (36.4 °C)  Pulse:  [] 92  Resp:  [16-37] 28  SpO2:  [100 %] 100 %  BP: (117-146)/(64-82) 125/65     Weight: 94.2 kg (207 lb 10.8 oz)  Body mass index is 28.96 kg/m².      Intake/Output Summary (Last 24 hours) at 2/6/2022 1012  Last data filed at 2/5/2022 1800  Gross per 24 hour   Intake 1270 ml   Output 400 ml   Net 870 ml       Physical Exam  Vitals reviewed.   Constitutional:       General: He is not in acute distress.     Appearance: He is ill-appearing.      Comments: Chronically ill appearing   HENT:      Head: Normocephalic and atraumatic.      Right Ear: External ear normal.      Left Ear: External ear normal.      Nose: Nose normal.      Mouth/Throat:      Mouth: Mucous membranes are moist.      Comments: trach  Eyes:      Conjunctiva/sclera: Conjunctivae normal.      Pupils: Pupils are equal, round, and reactive to light.   Cardiovascular:      Rate and Rhythm: Regular rhythm. Tachycardia present.      Pulses: Normal pulses.      Heart sounds: Normal heart sounds.   Pulmonary:      Effort: No respiratory distress.      Comments: clear breath sounds anteriorly  Abdominal:      Palpations: Abdomen is soft.   Musculoskeletal:         General: Normal range of motion.      Cervical back: Neck supple.      Right lower leg: Edema present.      Left lower leg: Edema present.   Lymphadenopathy:      Cervical: No cervical adenopathy.   Skin:     General: Skin is warm and dry.   Neurological:      Cranial Nerves: No cranial nerve deficit.      Comments:  Remains on mechanical ventilation. Will track at times         Vents:  Vent Mode: A/C (02/06/22 0114)  Set Rate: 16 BPM (02/06/22 0114)  Vt Set: 480 mL (02/06/22 0114)  Pressure Support: 15 cmH20 (02/02/22 0400)  PEEP/CPAP: 5 cmH20 (02/06/22 0114)  Oxygen Concentration (%): 40 (02/06/22 0720)  Peak Airway Pressure: 18 cmH2O (02/06/22 0114)  Total Ve: 6.8 mL (02/06/22 0114)  F/VT Ratio<105 (RSBI): (!) 103.45 (02/04/22 2030)    Lines/Drains/Airways     Peripherally Inserted Central Catheter Line            PICC Double Lumen 01/05/22 left basilic 32 days          Central Venous Catheter Line                 Hemodialysis Catheter 12/30/21 0900 right internal jugular 38 days          Drain                 NG/OG Tube [place] sump 18 Fr. Left nostril -- days         Colostomy 12/18/21 1030 Descending/sigmoid LUQ 49 days          Airway                 Surgical Airway 01/04/22 Shiley 33 days                Significant Labs:    CBC/Anemia Profile:  Recent Labs   Lab 02/05/22  0336 02/06/22  0236   WBC 14.04* 13.85*   HGB 8.1* 8.4*   HCT 23.9* 25.4*    309   MCV 87.9 92.4   RDW 15.6* 16.3*        Chemistries:  Recent Labs   Lab 02/05/22  0506 02/06/22  0237    139   K 3.8 3.7    102   CO2 24 27   BUN 40* 50*   CREATININE 1.92* 2.14*   CALCIUM 7.7* 7.7*       All pertinent labs within the past 24 hours have been reviewed.    Significant Imaging:  I have reviewed all pertinent imaging results/findings within the past 24 hours.

## 2022-02-06 NOTE — ASSESSMENT & PLAN NOTE
Currently hypotensive. Holding any antihypertensives  BP looks a little better this am. Weaning pressor as tolerated  1/21- No longer on pressor.   1/31- hemodynamically stable  2/6- BP looks ok

## 2022-02-06 NOTE — ASSESSMENT & PLAN NOTE
- H/H down to 5.5/16 - pt had bleeding from his HD insertion site yesterday this has now resolved.   - will transfuse 2 units PRBCs on HD  1/2- 9.7/27.5 - repeat CBC in AM  1/4- Hgb is 7.2. Not sure if he will be transfused during surgery. IF not we will try to get  Him transfused with next HD  01/06/2021 transfuse 1 unit of packed red blood cells today  01/10/2021 needs a unit of blood  1/11- repeat H&H in am. Continue to transfuse as needed.  1/13- Hgb is 5. Transfusing today. Post transfusion H&H  1/14- CBC pending for today  1/15- 6/18 - hemodynamically stable, recheck in AM   1/16-- 6.1/17.1 - critical low blood shortage in hospital - holding transfusion until next HD since he is hemodynamically stable and not actively bleeding   01/17/2021 day transfuse blood on dialysis today  01/18/2020 to hemoglobin up to 9 after transfusion  01/25/2022 no signs of bleeding most recent hemoglobin 8  01/29/2022 patient will need transfusion today  1/31- Hgb 6.7 yesterday. Can transfuse with next HD session  2/2- H&H is pending  2/3- plan to transfuse 1 unit of PRBC's  2/4 Hgb is 6.9 today. We will transfuse another unit today  2/6 8.4 now

## 2022-02-06 NOTE — PLAN OF CARE
Problem: Adult Inpatient Plan of Care  Goal: Plan of Care Review  Outcome: Ongoing, Progressing  Goal: Patient-Specific Goal (Individualized)  Outcome: Ongoing, Progressing  Goal: Optimal Comfort and Wellbeing  Outcome: Ongoing, Progressing     Problem: Adjustment to Illness (Sepsis/Septic Shock)  Goal: Optimal Coping  Outcome: Ongoing, Progressing     Problem: Bleeding (Sepsis/Septic Shock)  Goal: Absence of Bleeding  Outcome: Ongoing, Progressing     Problem: Infection Progression (Sepsis/Septic Shock)  Goal: Absence of Infection Signs and Symptoms  Outcome: Ongoing, Progressing     Problem: Nutrition Impaired (Sepsis/Septic Shock)  Goal: Optimal Nutrition Intake  Outcome: Ongoing, Progressing     Problem: Fluid and Electrolyte Imbalance (Acute Kidney Injury/Impairment)  Goal: Fluid and Electrolyte Balance  Outcome: Ongoing, Progressing     Problem: Oral Intake Inadequate (Acute Kidney Injury/Impairment)  Goal: Optimal Nutrition Intake  Outcome: Ongoing, Progressing     Problem: Renal Function Impairment (Acute Kidney Injury/Impairment)  Goal: Effective Renal Function  Outcome: Ongoing, Progressing

## 2022-02-06 NOTE — ASSESSMENT & PLAN NOTE
- 12/14 intubated, tracheostomy 1/4  Weaning waxes and wanes     1/27- continue to increase as tolerated. Currently doing 4 hours SIMV with reduced rate of 4. - will transition his fentanyl infusion to a patch of 50mcg today    Continue weaning from the vent  1/31- plan for 5 hours tid today. Continue to increase as tolerated  2/1- He was not able to complete trial last night. We will continue with current plan and try again today  2/2- had issues yesterday due to tachycardia and anxiety. I have added ativan prn  2/3 was not able to do any trials yesterday. I am going to rest on the ventilator today  2/4 plan to resume some trials following HD today  2/5- still having issues with weaning trials. A lot of secretions. I have added mucinex.  2/6 on a trial this am and doing ok. Will continue throughout today as tolerated

## 2022-02-06 NOTE — PROGRESS NOTES
[facility]  Nephrology  Progress Note    Patient Name: Og Garcia  MRN: [id number]  Admission Date: 12/23/2021  Hospital Length of Stay: 45 days  Attending Provider: Cecil Abernathy DO   Primary Care Physician: Hector Arndt DNP, FNP-C  Principal Problem:Denice gangrene    Consults  Subjective:     Interval History: F/U Renal failure.     Review of patient's allergies indicates:  No Known Allergies  Current Facility-Administered Medications   Medication Frequency    0.9%  NaCl infusion (for blood administration) Q24H PRN    0.9%  NaCl infusion PRN    0.9%  NaCl infusion PRN    acetaminophen tablet 1,000 mg Q6H PRN    acetaminophen tablet 500 mg Q6H PRN    albuterol nebulizer solution 2.5 mg Q4H PRN    bisacodyL EC tablet 10 mg Daily PRN    calcium gluconate 1 g in dextrose 5 % in water (D5W) 5 % 100 mL IVPB (MB+) Once    dextromethorphan-guaiFENesin  mg/5 ml liquid 10 mL Q6H PRN    dextrose 50% injection 12.5 g PRN    diltiaZEM tablet 60 mg Q6H    fentaNYL 50 mcg/hr 1 patch Q72H    glucagon (human recombinant) injection 1 mg PRN    guaiFENesin 100 mg/5 ml syrup 200 mg Q4H    heparin (porcine) injection 4,000 Units PRN    heparin (porcine) injection 5,000 Units Q8H    insulin aspart U-100 injection 1-10 Units Q6H    lorazepam injection 2 mg Q6H PRN    metoprolol injection 5 mg Q5 Min PRN    NORepinephrine 32 mg in dextrose 5 % 250 mL infusion PRN    ondansetron injection 8 mg Q6H PRN    pantoprazole suspension 40 mg Daily    sevelamer carbonate pwpk 0.8 g TID WM    simethicone chewable tablet 80 mg TID PRN    ticagrelor tablet 90 mg BID    traZODone tablet 50 mg Nightly PRN       Objective:     Vital Signs (Most Recent):  Temp: 97.6 °F (36.4 °C) (02/06/22 0700)  Pulse: 92 (02/06/22 1000)  Resp: 19 (02/06/22 1000)  BP: 136/70 (02/06/22 1000)  SpO2: 100 % (02/06/22 1000)  O2 Device (Oxygen Therapy): ventilator (02/06/22 0114) Vital Signs (24h  Range):  Temp:  [97.5 °F (36.4 °C)-99.2 °F (37.3 °C)] 97.6 °F (36.4 °C)  Pulse:  [] 92  Resp:  [16-37] 19  SpO2:  [100 %] 100 %  BP: (117-146)/(64-82) 136/70     Weight: 94.2 kg (207 lb 10.8 oz) (02/06/22 0600)  Body mass index is 28.96 kg/m².  Body surface area is 2.17 meters squared.    I/O last 3 completed shifts:  In: 2723.3 [I.V.:40; Blood:433.3; NG/GT:2250]  Out: 400 [Other:300; Stool:100]    Physical Exam Wakes up easily. Remains on vent. No edema    Significant Labs:sure  BMP:   Recent Labs   Lab 01/31/22  0629 02/03/22  0824 02/06/22  0237   *   < > 132*   *   < > 139   K 4.4   < > 3.7   CL 98   < > 102   CO2 21   < > 27   BUN 73*   < > 50*   CREATININE 3.22*   < > 2.14*   CALCIUM 8.0*   < > 7.7*   MG 2.2  --   --     < > = values in this interval not displayed.     All labs within the past 24 hours have been reviewed.    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] 01/17/2022 Unknown    Hyperphosphatemia [E83.39] 01/07/2022 No    Acute blood loss anemia [D62] 12/25/2021 No    Type 2 diabetes mellitus without complication, without long-term current use of insulin [E11.9] 12/25/2021 Yes    Necrotizing fasciitis [M72.6]  Yes    Hypocalcemia [E83.51] 12/16/2021 Yes    RAHAT (acute kidney injury) [N17.9] 12/14/2021 Yes    On mechanically assisted ventilation [Z99.11] 12/14/2021 Not Applicable    Hypertension [I10] 09/02/2021 Yes      Problems Resolved During this Admission:    Diagnosis Date Noted Date Resolved POA    Ventilator associated pneumonia [J95.851] 01/09/2022 01/27/2022 No    Shock [R57.9] 01/08/2022 01/16/2022 Yes       Continue support and follow renal fct for return of fct.    Thank you for your consult. I will follow-up with patient. Please contact us if you have any additional questions.    Theo Coe,  MD  Nephrology  [facility]

## 2022-02-07 NOTE — ASSESSMENT & PLAN NOTE
- tunneled HD line placement 12/30  - continue with dialysis per nephrology  - Plan for HD today

## 2022-02-07 NOTE — DIALYSIS ROUNDING
"          Nephrology Department            NAME: Og Garcia   YOB: 1955  MRN: [id number]  NOTE DATE: 02/07/2022       Seen on dialysis in [facility] [address]  He is tolerating the procedure.    Patient complains:  None.      REVIEW OF SYSTEMS:  he reports no shortness of breath, nausea or vomiting. No acute changes.    VITALS:  height is 5' 11" (1.803 m) and weight is 90.8 kg (200 lb 2.8 oz). His axillary temperature is 99 °F (37.2 °C). His blood pressure is 126/65 and his pulse is 99. His respiration is 29 (abnormal) and oxygen saturation is 100%.  Hemodialysis documentation flowsheet reviewed with dialysis nurse, including weight and vitals.    Resting comfortably, NAD.  Respiration unlabored. The patient is ventilated Lungs clear.  Heart regular, no rub.  Abdomen soft, nontender, positive bowl sounds  No edema.    Recent Labs   Lab 02/05/22  0506 02/06/22  0237 02/07/22  0538    139 141   K 3.8 3.7 4.0    102 104   CO2 24 27 24   BUN 40* 50* 62*   CREATININE 1.92* 2.14* 2.17*   CALCIUM 7.7* 7.7* 7.9*     Recent Labs   Lab 02/05/22  0336 02/06/22  0236 02/07/22  0538   WBC 14.04* 13.85* 14.76*   HGB 8.1* 8.4* 8.4*   HCT 23.9* 25.4* 26.2*    309 350       ASSESSMENT/PLAN:  Continue with scheduled hemodialysis for this patient.    Edwin Pryor Jr, MD  "

## 2022-02-07 NOTE — RESPIRATORY THERAPY
Pt. Tolerated simv RR of 2 trial well x 5 hours. Pt. Placed back on previous AC settings at this time.

## 2022-02-07 NOTE — SUBJECTIVE & OBJECTIVE
Interval History: No acute events overnight. Currently hemodynamically stable. Oxygenating adequately on the ventilator. Currently afebrile. Plan for HD today.    Objective:     Vital Signs (Most Recent):  Temp: 99 °F (37.2 °C) (02/07/22 0850)  Pulse: 100 (02/07/22 1030)  Resp: (!) 23 (02/07/22 1030)  BP: 119/68 (02/07/22 1030)  SpO2: 100 % (02/07/22 1030) Vital Signs (24h Range):  Temp:  [98.2 °F (36.8 °C)-99.2 °F (37.3 °C)] 99 °F (37.2 °C)  Pulse:  [] 100  Resp:  [14-42] 23  SpO2:  [98 %-100 %] 100 %  BP: (109-162)/(57-90) 119/68     Weight: 90.8 kg (200 lb 2.8 oz)  Body mass index is 27.92 kg/m².      Intake/Output Summary (Last 24 hours) at 2/7/2022 1041  Last data filed at 2/7/2022 0400  Gross per 24 hour   Intake 1920 ml   Output 605 ml   Net 1315 ml       Physical Exam  Vitals reviewed.   Constitutional:       General: He is not in acute distress.     Appearance: He is ill-appearing.      Comments: Chronically ill appearing   HENT:      Head: Normocephalic and atraumatic.      Right Ear: External ear normal.      Left Ear: External ear normal.      Nose: Nose normal.      Mouth/Throat:      Mouth: Mucous membranes are moist.      Comments: trach  Eyes:      Conjunctiva/sclera: Conjunctivae normal.      Pupils: Pupils are equal, round, and reactive to light.   Cardiovascular:      Rate and Rhythm: Regular rhythm. Tachycardia present.      Pulses: Normal pulses.      Heart sounds: Normal heart sounds.   Pulmonary:      Effort: No respiratory distress.      Comments: clear breath sounds anteriorly  Abdominal:      Palpations: Abdomen is soft.   Musculoskeletal:         General: Normal range of motion.      Cervical back: Neck supple.      Right lower leg: Edema present.      Left lower leg: Edema present.   Lymphadenopathy:      Cervical: No cervical adenopathy.   Skin:     General: Skin is warm and dry.   Neurological:      Cranial Nerves: No cranial nerve deficit.      Comments: Remains on mechanical  ventilation. Will track at times         Vents:  Vent Mode: SIMV (02/07/22 0233)  Set Rate: 2 BPM (02/07/22 0233)  Vt Set: 480 mL (02/07/22 0233)  Pressure Support: 15 cmH20 (02/07/22 0233)  PEEP/CPAP: 5 cmH20 (02/07/22 0233)  Oxygen Concentration (%): 40 (02/07/22 0400)  Peak Airway Pressure: 19 cmH2O (02/07/22 0233)  Total Ve: 6.8 mL (02/07/22 0233)  F/VT Ratio<105 (RSBI): (!) 29.46 (02/06/22 1600)    Lines/Drains/Airways     Peripherally Inserted Central Catheter Line            PICC Double Lumen 01/05/22 left basilic 33 days          Central Venous Catheter Line                 Hemodialysis Catheter 12/30/21 0900 right internal jugular 39 days          Drain                 NG/OG Tube [place] sump 18 Fr. Left nostril -- days         Colostomy 12/18/21 1030 Descending/sigmoid LUQ 51 days          Airway                 Surgical Airway 01/04/22 Shiley 34 days                Significant Labs:    CBC/Anemia Profile:  Recent Labs   Lab 02/06/22  0236 02/07/22  0538   WBC 13.85* 14.76*   HGB 8.4* 8.4*   HCT 25.4* 26.2*    350   MCV 92.4 93.6   RDW 16.3* 16.0*        Chemistries:  Recent Labs   Lab 02/06/22  0237 02/07/22  0538    141   K 3.7 4.0    104   CO2 27 24   BUN 50* 62*   CREATININE 2.14* 2.17*   CALCIUM 7.7* 7.9*       All pertinent labs within the past 24 hours have been reviewed.    Significant Imaging:  I have reviewed all pertinent imaging results/findings within the past 24 hours.

## 2022-02-07 NOTE — ASSESSMENT & PLAN NOTE
Currently hypotensive. Holding any antihypertensives  BP looks a little better this am. Weaning pressor as tolerated  1/21- No longer on pressor.   1/31- hemodynamically stable  2/7- BP looks ok

## 2022-02-07 NOTE — ASSESSMENT & PLAN NOTE
- H/H down to 5.5/16 - pt had bleeding from his HD insertion site yesterday this has now resolved.   - will transfuse 2 units PRBCs on HD  1/2- 9.7/27.5 - repeat CBC in AM  1/4- Hgb is 7.2. Not sure if he will be transfused during surgery. IF not we will try to get  Him transfused with next HD  01/06/2021 transfuse 1 unit of packed red blood cells today  01/10/2021 needs a unit of blood  1/11- repeat H&H in am. Continue to transfuse as needed.  1/13- Hgb is 5. Transfusing today. Post transfusion H&H  1/14- CBC pending for today  1/15- 6/18 - hemodynamically stable, recheck in AM   1/16-- 6.1/17.1 - critical low blood shortage in hospital - holding transfusion until next HD since he is hemodynamically stable and not actively bleeding   01/17/2021 day transfuse blood on dialysis today  01/18/2020 to hemoglobin up to 9 after transfusion  01/25/2022 no signs of bleeding most recent hemoglobin 8  01/29/2022 patient will need transfusion today  1/31- Hgb 6.7 yesterday. Can transfuse with next HD session  2/2- H&H is pending  2/3- plan to transfuse 1 unit of PRBC's  2/4 Hgb is 6.9 today. We will transfuse another unit today  2/7 8.4 now

## 2022-02-07 NOTE — ASSESSMENT & PLAN NOTE
- 12/14 intubated, tracheostomy 1/4  Weaning waxes and wanes     1/27- continue to increase as tolerated. Currently doing 4 hours SIMV with reduced rate of 4. - will transition his fentanyl infusion to a patch of 50mcg today    Continue weaning from the vent  1/31- plan for 5 hours tid today. Continue to increase as tolerated  2/1- He was not able to complete trial last night. We will continue with current plan and try again today  2/2- had issues yesterday due to tachycardia and anxiety. I have added ativan prn  2/3 was not able to do any trials yesterday. I am going to rest on the ventilator today  2/4 plan to resume some trials following HD today  2/5- still having issues with weaning trials. A lot of secretions. I have added mucinex.  2/6 on a trial this am and doing ok. Will continue throughout today as tolerated  2/7- plan to keep same weaning trials today

## 2022-02-08 NOTE — PROGRESS NOTES
[facility]  Nephrology  Progress Note    Patient Name: Og Garcia  MRN: [id number]  Admission Date: 12/23/2021  Hospital Length of Stay: 47 days  Attending Provider: Cecil Abernathy DO   Primary Care Physician: Hector Arndt DNP, FNP-C  Principal Problem:Denice gangrene    Subjective:     HPI: The patient is known from the previous nephrology consult at [facility].  He is no at Special[facility] and continues to need dialysis support.      Interval History: No acute changes.    Review of patient's allergies indicates:  No Known Allergies  Current Facility-Administered Medications   Medication Frequency    0.9%  NaCl infusion (for blood administration) Q24H PRN    0.9%  NaCl infusion PRN    0.9%  NaCl infusion PRN    acetaminophen tablet 1,000 mg Q6H PRN    acetaminophen tablet 500 mg Q6H PRN    albuterol nebulizer solution 2.5 mg Q4H PRN    bisacodyL EC tablet 10 mg Daily PRN    calcium gluconate 1 g in dextrose 5 % in water (D5W) 5 % 100 mL IVPB (MB+) Once    dextromethorphan-guaiFENesin  mg/5 ml liquid 10 mL Q6H PRN    dextrose 50% injection 12.5 g PRN    diltiaZEM tablet 60 mg Q6H    fentaNYL 50 mcg/hr 1 patch Q72H    glucagon (human recombinant) injection 1 mg PRN    guaiFENesin 100 mg/5 ml syrup 200 mg Q4H    heparin (porcine) injection 4,000 Units PRN    heparin (porcine) injection 5,000 Units Q8H    insulin aspart U-100 injection 1-10 Units Q6H    lorazepam injection 2 mg Q6H PRN    metoprolol injection 5 mg Q5 Min PRN    NORepinephrine 32 mg in dextrose 5 % 250 mL infusion PRN    ondansetron injection 8 mg Q6H PRN    pantoprazole suspension 40 mg Daily    sevelamer carbonate pwpk 0.8 g TID WM    silver sulfADIAZINE 1% cream PRN    simethicone chewable tablet 80 mg TID PRN    ticagrelor tablet 90 mg BID    traZODone tablet 50 mg Nightly PRN       Objective:     Vital Signs (Most Recent):  Temp: 99.5 °F (37.5 °C) (02/08/22 1530)  Pulse: 98 (02/08/22  1715)  Resp: (!) 34 (02/08/22 1715)  BP: 117/68 (02/08/22 1715)  SpO2: 95 % (02/08/22 1715)  O2 Device (Oxygen Therapy): ventilator (02/08/22 0836) Vital Signs (24h Range):  Temp:  [99.1 °F (37.3 °C)-100.2 °F (37.9 °C)] 99.5 °F (37.5 °C)  Pulse:  [] 98  Resp:  [16-36] 34  SpO2:  [92 %-100 %] 95 %  BP: ()/(45-86) 117/68     Weight: 90.8 kg (200 lb 2.8 oz) (02/07/22 0400)  Body mass index is 27.92 kg/m².  Body surface area is 2.13 meters squared.    I/O last 3 completed shifts:  In: 1920 [NG/GT:1920]  Out: 300 [Stool:300]    Physical Exam  Vitals reviewed.   HENT:      Head: Atraumatic.   Cardiovascular:      Rate and Rhythm: Regular rhythm.      Heart sounds: Normal heart sounds.   Pulmonary:      Effort: Pulmonary effort is normal.      Breath sounds: Normal breath sounds.   Abdominal:      Palpations: Abdomen is soft.   Neurological:      Mental Status: He is alert.         Significant Labs:  BMP:   Recent Labs   Lab 02/07/22  0538   *      K 4.0      CO2 24   BUN 62*   CREATININE 2.17*   CALCIUM 7.9*        Significant Imaging:  Labs: Reviewed    Assessment/Plan:     RAHAT (acute kidney injury)  Dialyzed yesterday  1/31/2022 Continue with scheduled hemodialysis.  2/1/2022 Continue with dialysis for this patient.  2/2/2022 Dialysis sessions are going well.  2/3/2022 Dialysis as scheduled for this patient.  2/4/2022 The patient is doing well with dialysis.  Metabolics are stable.  2/8/2022 Continue with dialysis for this patient.        Thank you for your consult. I will follow-up with patient. Please contact us if you have any additional questions.    Edwin Pryor Jr, MD  Nephrology  [facility]

## 2022-02-08 NOTE — ASSESSMENT & PLAN NOTE
- 12/14 intubated, tracheostomy 1/4  Weaning waxes and wanes     1/27- continue to increase as tolerated. Currently doing 4 hours SIMV with reduced rate of 4. - will transition his fentanyl infusion to a patch of 50mcg today    Continue weaning from the vent  1/31- plan for 5 hours tid today. Continue to increase as tolerated  2/1- He was not able to complete trial last night. We will continue with current plan and try again today  2/2- had issues yesterday due to tachycardia and anxiety. I have added ativan prn  2/3 was not able to do any trials yesterday. I am going to rest on the ventilator today  2/4 plan to resume some trials following HD today  2/5- still having issues with weaning trials. A lot of secretions. I have added mucinex.  2/6 on a trial this am and doing ok. Will continue throughout today as tolerated  2/7- plan to keep same weaning trials today  2/8- weaning has been slow. He is doing ok today with simv with reduce rate of 2.

## 2022-02-08 NOTE — PLAN OF CARE
Problem: Adult Inpatient Plan of Care  Goal: Plan of Care Review  Outcome: Ongoing, Progressing     Problem: Glycemic Control Impaired (Sepsis/Septic Shock)  Goal: Blood Glucose Level Within Desired Range  Outcome: Ongoing, Progressing     Problem: Infection Progression (Sepsis/Septic Shock)  Goal: Absence of Infection Signs and Symptoms  Outcome: Ongoing, Progressing     Problem: Infection  Goal: Absence of Infection Signs and Symptoms  Outcome: Ongoing, Progressing     Problem: Fall Injury Risk  Goal: Absence of Fall and Fall-Related Injury  Outcome: Ongoing, Progressing

## 2022-02-08 NOTE — SUBJECTIVE & OBJECTIVE
Interval History: No acute events overnight. Currently hemodynamically stable. Oxygenating adequately on the ventilator. Currently afebrile.     Objective:     Vital Signs (Most Recent):  Temp: 99.1 °F (37.3 °C) (02/08/22 1215)  Pulse: 108 (02/08/22 1200)  Resp: (!) 36 (02/08/22 1200)  BP: 124/76 (02/08/22 1200)  SpO2: 100 % (02/08/22 1200) Vital Signs (24h Range):  Temp:  [98.5 °F (36.9 °C)-100.2 °F (37.9 °C)] 99.1 °F (37.3 °C)  Pulse:  [] 108  Resp:  [14-36] 36  SpO2:  [94 %-100 %] 100 %  BP: ()/(45-86) 124/76     Weight: 90.8 kg (200 lb 2.8 oz)  Body mass index is 27.92 kg/m².      Intake/Output Summary (Last 24 hours) at 2/8/2022 1248  Last data filed at 2/8/2022 0345  Gross per 24 hour   Intake --   Output 300 ml   Net -300 ml       Physical Exam  Vitals reviewed.   Constitutional:       General: He is not in acute distress.     Appearance: He is ill-appearing.      Comments: Chronically ill appearing   HENT:      Head: Normocephalic and atraumatic.      Right Ear: External ear normal.      Left Ear: External ear normal.      Nose: Nose normal.      Mouth/Throat:      Mouth: Mucous membranes are moist.      Comments: trach  Eyes:      Conjunctiva/sclera: Conjunctivae normal.      Pupils: Pupils are equal, round, and reactive to light.   Cardiovascular:      Rate and Rhythm: Regular rhythm. Tachycardia present.      Pulses: Normal pulses.      Heart sounds: Normal heart sounds.   Pulmonary:      Effort: No respiratory distress.      Comments: clear breath sounds anteriorly  Abdominal:      Palpations: Abdomen is soft.   Musculoskeletal:         General: Normal range of motion.      Cervical back: Neck supple.      Right lower leg: Edema present.      Left lower leg: Edema present.   Lymphadenopathy:      Cervical: No cervical adenopathy.   Skin:     General: Skin is warm and dry.   Neurological:      Cranial Nerves: No cranial nerve deficit.      Comments: Remains on mechanical ventilation. Will  track at times         Vents:  Vent Mode: SIMV (pt placed on simv rr2 ps 15 and peep 5) (02/08/22 0915)  Set Rate: 16 BPM (02/08/22 0836)  Vt Set: 480 mL (02/08/22 0836)  Pressure Support: 15 cmH20 (02/07/22 0233)  PEEP/CPAP: 5 cmH20 (02/08/22 0836)  Oxygen Concentration (%): 40 (02/08/22 0545)  Peak Airway Pressure: 23 cmH2O (02/08/22 0836)  Total Ve: 7.9 mL (02/08/22 0836)  F/VT Ratio<105 (RSBI): (!) 82.07 (02/07/22 1630)    Lines/Drains/Airways     Peripherally Inserted Central Catheter Line            PICC Double Lumen 01/05/22 left basilic 34 days          Central Venous Catheter Line                 Hemodialysis Catheter 12/30/21 0900 right internal jugular 40 days          Drain                 NG/OG Tube [place] sump 18 Fr. Left nostril -- days         Colostomy 12/18/21 1030 Descending/sigmoid LUQ 52 days          Airway                 Surgical Airway 01/04/22 [place] 35 days                Significant Labs:    CBC/Anemia Profile:  Recent Labs   Lab 02/07/22  0538   WBC 14.76*   HGB 8.4*   HCT 26.2*      MCV 93.6   RDW 16.0*        Chemistries:  Recent Labs   Lab 02/07/22  0538      K 4.0      CO2 24   BUN 62*   CREATININE 2.17*   CALCIUM 7.9*       All pertinent labs within the past 24 hours have been reviewed.    Significant Imaging:  I have reviewed all pertinent imaging results/findings within the past 24 hours.

## 2022-02-08 NOTE — ASSESSMENT & PLAN NOTE
Dialyzed yesterday  1/31/2022 Continue with scheduled hemodialysis.  2/1/2022 Continue with dialysis for this patient.  2/2/2022 Dialysis sessions are going well.  2/3/2022 Dialysis as scheduled for this patient.  2/4/2022 The patient is doing well with dialysis.  Metabolics are stable.  2/8/2022 Continue with dialysis for this patient.

## 2022-02-08 NOTE — PLAN OF CARE
Problem: Device-Related Complication Risk (Mechanical Ventilation, Invasive)  Goal: Optimal Device Function  Outcome: Ongoing, Progressing     Problem: Inability to Wean (Mechanical Ventilation, Invasive)  Goal: Mechanical Ventilation Liberation  Outcome: Ongoing, Progressing

## 2022-02-08 NOTE — PLAN OF CARE
Problem: Adult Inpatient Plan of Care  Goal: Plan of Care Review  Outcome: Ongoing, Progressing  Flowsheets (Taken 2/8/2022 1530)  Plan of Care Reviewed With:   patient   family  Goal: Patient-Specific Goal (Individualized)  Outcome: Ongoing, Progressing  Flowsheets (Taken 2/8/2022 1530)  Anxieties, Fears or Concerns: fear of being unable to resume adls as pre admit  Individualized Care Needs: pt will be as close to pre hospital adl prior to discharge  Patient-Specific Goals (Include Timeframe): pt will be able to breathe using nasal cannula  Goal: Absence of Hospital-Acquired Illness or Injury  Outcome: Ongoing, Progressing

## 2022-02-08 NOTE — SUBJECTIVE & OBJECTIVE
Interval History: No acute changes.    Review of patient's allergies indicates:  No Known Allergies  Current Facility-Administered Medications   Medication Frequency    0.9%  NaCl infusion (for blood administration) Q24H PRN    0.9%  NaCl infusion PRN    0.9%  NaCl infusion PRN    acetaminophen tablet 1,000 mg Q6H PRN    acetaminophen tablet 500 mg Q6H PRN    albuterol nebulizer solution 2.5 mg Q4H PRN    bisacodyL EC tablet 10 mg Daily PRN    calcium gluconate 1 g in dextrose 5 % in water (D5W) 5 % 100 mL IVPB (MB+) Once    dextromethorphan-guaiFENesin  mg/5 ml liquid 10 mL Q6H PRN    dextrose 50% injection 12.5 g PRN    diltiaZEM tablet 60 mg Q6H    fentaNYL 50 mcg/hr 1 patch Q72H    glucagon (human recombinant) injection 1 mg PRN    guaiFENesin 100 mg/5 ml syrup 200 mg Q4H    heparin (porcine) injection 4,000 Units PRN    heparin (porcine) injection 5,000 Units Q8H    insulin aspart U-100 injection 1-10 Units Q6H    lorazepam injection 2 mg Q6H PRN    metoprolol injection 5 mg Q5 Min PRN    NORepinephrine 32 mg in dextrose 5 % 250 mL infusion PRN    ondansetron injection 8 mg Q6H PRN    pantoprazole suspension 40 mg Daily    sevelamer carbonate pwpk 0.8 g TID WM    silver sulfADIAZINE 1% cream PRN    simethicone chewable tablet 80 mg TID PRN    ticagrelor tablet 90 mg BID    traZODone tablet 50 mg Nightly PRN       Objective:     Vital Signs (Most Recent):  Temp: 99.5 °F (37.5 °C) (02/08/22 1530)  Pulse: 98 (02/08/22 1715)  Resp: (!) 34 (02/08/22 1715)  BP: 117/68 (02/08/22 1715)  SpO2: 95 % (02/08/22 1715)  O2 Device (Oxygen Therapy): ventilator (02/08/22 0836) Vital Signs (24h Range):  Temp:  [99.1 °F (37.3 °C)-100.2 °F (37.9 °C)] 99.5 °F (37.5 °C)  Pulse:  [] 98  Resp:  [16-36] 34  SpO2:  [92 %-100 %] 95 %  BP: ()/(45-86) 117/68     Weight: 90.8 kg (200 lb 2.8 oz) (02/07/22 0400)  Body mass index is 27.92 kg/m².  Body surface area is 2.13 meters squared.    I/O last  3 completed shifts:  In: 1920 [NG/GT:1920]  Out: 300 [Stool:300]    Physical Exam  Vitals reviewed.   HENT:      Head: Atraumatic.   Cardiovascular:      Rate and Rhythm: Regular rhythm.      Heart sounds: Normal heart sounds.   Pulmonary:      Effort: Pulmonary effort is normal.      Breath sounds: Normal breath sounds.   Abdominal:      Palpations: Abdomen is soft.   Neurological:      Mental Status: He is alert.         Significant Labs:  BMP:   Recent Labs   Lab 02/07/22  0538   *      K 4.0      CO2 24   BUN 62*   CREATININE 2.17*   CALCIUM 7.9*        Significant Imaging:  Labs: Reviewed

## 2022-02-08 NOTE — ASSESSMENT & PLAN NOTE
- tunneled HD line placement 12/30  - continue with dialysis per nephrology  - Plan for HD today 2/7

## 2022-02-09 NOTE — PROGRESS NOTES
Patient is seen on hemodialysis, he is tolerating this well, we will continue his treatment unchanged.

## 2022-02-09 NOTE — ASSESSMENT & PLAN NOTE
Currently hypotensive. Holding any antihypertensives  BP looks a little better this am. Weaning pressor as tolerated  1/21- No longer on pressor.   1/31- hemodynamically stable  Blood pressure not an issue

## 2022-02-09 NOTE — ASSESSMENT & PLAN NOTE
- 12/14 intubated, tracheostomy 1/4  Weaning waxes and wanes     1/27- continue to increase as tolerated. Currently doing 4 hours SIMV with reduced rate of 4. - will transition his fentanyl infusion to a patch of 50mcg today    Continue weaning from the vent  1/31- plan for 5 hours tid today. Continue to increase as tolerated  2/1- He was not able to complete trial last night. We will continue with current plan and try again today  2/2- had issues yesterday due to tachycardia and anxiety. I have added ativan prn  2/3 was not able to do any trials yesterday. I am going to rest on the ventilator today  2/4 plan to resume some trials following HD today  2/5- still having issues with weaning trials. A lot of secretions. I have added mucinex.  2/6 on a trial this am and doing ok. Will continue throughout today as tolerated  2/7- plan to keep same weaning trials today  2/8- weaning has been slow. He is doing ok today with simv with reduce rate of 2.  02/09/2022 continue weaning

## 2022-02-09 NOTE — SUBJECTIVE & OBJECTIVE
Interval History:  Patient without complaints    Objective:     Vital Signs (Most Recent):  Temp: 99.2 °F (37.3 °C) (02/09/22 0400)  Pulse: 89 (02/08/22 2300)  Resp: 15 (02/08/22 2300)  BP: (!) 119/57 (02/08/22 2300)  SpO2: 100 % (02/08/22 2300) Vital Signs (24h Range):  Temp:  [98.9 °F (37.2 °C)-99.5 °F (37.5 °C)] 99.2 °F (37.3 °C)  Pulse:  [] 89  Resp:  [14-36] 15  SpO2:  [92 %-100 %] 100 %  BP: ()/(47-84) 119/57     Weight: 90.8 kg (200 lb 2.8 oz)  Body mass index is 27.92 kg/m².      Intake/Output Summary (Last 24 hours) at 2/9/2022 0633  Last data filed at 2/8/2022 2000  Gross per 24 hour   Intake 50 ml   Output --   Net 50 ml       Physical Exam  Vitals reviewed.   Constitutional:       Appearance: Normal appearance.      Interventions: He is not intubated.  HENT:      Head: Normocephalic and atraumatic.      Nose: Nose normal.      Mouth/Throat:      Mouth: Mucous membranes are dry.      Pharynx: Oropharynx is clear.   Eyes:      Extraocular Movements: Extraocular movements intact.      Conjunctiva/sclera: Conjunctivae normal.      Pupils: Pupils are equal, round, and reactive to light.   Cardiovascular:      Rate and Rhythm: Normal rate.      Heart sounds: Normal heart sounds. No murmur heard.      Pulmonary:      Effort: Pulmonary effort is normal. He is not intubated.      Breath sounds: Normal breath sounds.   Abdominal:      General: Abdomen is flat. Bowel sounds are normal.      Palpations: Abdomen is soft.   Musculoskeletal:         General: Normal range of motion.      Cervical back: Normal range of motion and neck supple.      Right lower leg: No edema.      Left lower leg: No edema.   Skin:     General: Skin is warm and dry.      Capillary Refill: Capillary refill takes less than 2 seconds.   Neurological:      General: No focal deficit present.      Mental Status: He is alert and oriented to person, place, and time.   Psychiatric:         Mood and Affect: Mood normal.         Behavior:  Behavior normal.         Vents:  Vent Mode: A/C (02/09/22 0445)  Set Rate: 2 BPM (02/09/22 0445)  Vt Set: 480 mL (02/09/22 0445)  Pressure Support: 15 cmH20 (02/09/22 0445)  PEEP/CPAP: 5 cmH20 (02/09/22 0445)  Oxygen Concentration (%): 40 (02/08/22 0545)  Peak Airway Pressure: 19 cmH2O (02/09/22 0445)  Total Ve: 10.1 mL (02/09/22 0445)  F/VT Ratio<105 (RSBI): (!) 82.07 (02/07/22 1630)    Lines/Drains/Airways     Peripherally Inserted Central Catheter Line            PICC Double Lumen 01/05/22 left basilic 35 days          Central Venous Catheter Line                 Hemodialysis Catheter 12/30/21 0900 right internal jugular 40 days          Drain                 NG/OG Tube [place] sump 18 Fr. Left nostril -- days         Colostomy 12/18/21 1030 Descending/sigmoid LUQ 52 days          Airway                 Surgical Airway 01/04/22 Shiley 36 days                Significant Labs:    CBC/Anemia Profile:  No results for input(s): WBC, HGB, HCT, PLT, MCV, RDW, IRON, FERRITIN, RETIC, FOLATE, [id number], OCCULTBLOOD in the last 48 hours.     Chemistries:  No results for input(s): NA, K, CL, CO2, BUN, CREATININE, CALCIUM, ALBUMIN, PROT, BILITOT, ALKPHOS, ALT, AST, GLUCOSE, MG, PHOS in the last 48 hours.    All pertinent labs within the past 24 hours have been reviewed.    Significant Imaging:  I have reviewed all pertinent imaging results/findings within the past 24 hours.

## 2022-02-09 NOTE — PLAN OF CARE
Problem: Communication Impairment (Mechanical Ventilation, Invasive)  Goal: Effective Communication  Outcome: Ongoing, Progressing     Problem: Device-Related Complication Risk (Mechanical Ventilation, Invasive)  Goal: Optimal Device Function  Outcome: Ongoing, Progressing     Problem: Inability to Wean (Mechanical Ventilation, Invasive)  Goal: Mechanical Ventilation Liberation  Outcome: Ongoing, Progressing     Problem: Nutrition Impairment (Mechanical Ventilation, Invasive)  Goal: Optimal Nutrition Delivery  Outcome: Ongoing, Progressing     Problem: Skin and Tissue Injury (Mechanical Ventilation, Invasive)  Goal: Absence of Device-Related Skin and Tissue Injury  Outcome: Ongoing, Progressing     Problem: Ventilator-Induced Lung Injury (Mechanical Ventilation, Invasive)  Goal: Absence of Ventilator-Induced Lung Injury  Outcome: Ongoing, Progressing     Problem: Communication Impairment (Artificial Airway)  Goal: Effective Communication  Outcome: Ongoing, Progressing     Problem: Device-Related Complication Risk (Artificial Airway)  Goal: Optimal Device Function  Outcome: Ongoing, Progressing     Problem: Skin and Tissue Injury (Artificial Airway)  Goal: Absence of Device-Related Skin or Tissue Injury  Outcome: Ongoing, Progressing     Problem: Noninvasive Ventilation Acute  Goal: Effective Unassisted Ventilation and Oxygenation  Outcome: Ongoing, Progressing     Problem: Communication Impairment (Mechanical Ventilation, Invasive)  Goal: Effective Communication  Outcome: Ongoing, Progressing     Problem: Device-Related Complication Risk (Mechanical Ventilation, Invasive)  Goal: Optimal Device Function  Outcome: Ongoing, Progressing     Problem: Inability to Wean (Mechanical Ventilation, Invasive)  Goal: Mechanical Ventilation Liberation  Outcome: Ongoing, Progressing     Problem: Nutrition Impairment (Mechanical Ventilation, Invasive)  Goal: Optimal Nutrition Delivery  Outcome: Ongoing, Progressing     Problem:  Skin and Tissue Injury (Mechanical Ventilation, Invasive)  Goal: Absence of Device-Related Skin and Tissue Injury  Outcome: Ongoing, Progressing     Problem: Ventilator-Induced Lung Injury (Mechanical Ventilation, Invasive)  Goal: Absence of Ventilator-Induced Lung Injury  Outcome: Ongoing, Progressing     Problem: Communication Impairment (Artificial Airway)  Goal: Effective Communication  Outcome: Ongoing, Progressing     Problem: Device-Related Complication Risk (Artificial Airway)  Goal: Optimal Device Function  Outcome: Ongoing, Progressing     Problem: Skin and Tissue Injury (Artificial Airway)  Goal: Absence of Device-Related Skin or Tissue Injury  Outcome: Ongoing, Progressing     Problem: Noninvasive Ventilation Acute  Goal: Effective Unassisted Ventilation and Oxygenation  Outcome: Ongoing, Progressing

## 2022-02-09 NOTE — PROGRESS NOTES
Wound care note;  Patient skin was evaluated today  ELSA Young FNP assessed all wounds today  Patient in bed, eyes open, On ventilator  Sacral wound with pink/tan tissue, full thickness  Rectal wound slowly improving, red granular tissue , outer edges moist - Cont Dakins moist gauze   Abdomen wound with red granular tissue, undermining improving , cont NPWT   Right dorsal hand with black soft tissue and Left posterior heel with tan dry necrotic tissue- to start silvadene cream to areas on 2/8/22.  Has purple abrasion to Right lateral and medial foot  Cont turn protocol  Waffle boots  On low air loss mattress

## 2022-02-09 NOTE — PROGRESS NOTES
[facility]  Pulmonology  Progress Note    Patient Name: Og Garcia  MRN: [id number]  Admission Date: 12/23/2021  Hospital Length of Stay: 48 days  Code Status: Prior  Attending Provider: Cecil Abernathy DO  Primary Care Provider: Hector Arndt DNP, FNP-C   Principal Problem: Denice gangrene    Subjective:     Interval History:  Patient without complaints    Objective:     Vital Signs (Most Recent):  Temp: 99.2 °F (37.3 °C) (02/09/22 0400)  Pulse: 89 (02/08/22 2300)  Resp: 15 (02/08/22 2300)  BP: (!) 119/57 (02/08/22 2300)  SpO2: 100 % (02/08/22 2300) Vital Signs (24h Range):  Temp:  [98.9 °F (37.2 °C)-99.5 °F (37.5 °C)] 99.2 °F (37.3 °C)  Pulse:  [] 89  Resp:  [14-36] 15  SpO2:  [92 %-100 %] 100 %  BP: ()/(47-84) 119/57     Weight: 90.8 kg (200 lb 2.8 oz)  Body mass index is 27.92 kg/m².      Intake/Output Summary (Last 24 hours) at 2/9/2022 0633  Last data filed at 2/8/2022 2000  Gross per 24 hour   Intake 50 ml   Output --   Net 50 ml       Physical Exam  Vitals reviewed.   Constitutional:       Appearance: Normal appearance.      Interventions: He is not intubated.  HENT:      Head: Normocephalic and atraumatic.      Nose: Nose normal.      Mouth/Throat:      Mouth: Mucous membranes are dry.      Pharynx: Oropharynx is clear.   Eyes:      Extraocular Movements: Extraocular movements intact.      Conjunctiva/sclera: Conjunctivae normal.      Pupils: Pupils are equal, round, and reactive to light.   Cardiovascular:      Rate and Rhythm: Normal rate.      Heart sounds: Normal heart sounds. No murmur heard.      Pulmonary:      Effort: Pulmonary effort is normal. He is not intubated.      Breath sounds: Normal breath sounds.   Abdominal:      General: Abdomen is flat. Bowel sounds are normal.      Palpations: Abdomen is soft.   Musculoskeletal:         General: Normal range of motion.      Cervical back: Normal range of motion and neck supple.      Right lower leg: No  edema.      Left lower leg: No edema.   Skin:     General: Skin is warm and dry.      Capillary Refill: Capillary refill takes less than 2 seconds.   Neurological:      General: No focal deficit present.      Mental Status: He is alert and oriented to person, place, and time.   Psychiatric:         Mood and Affect: Mood normal.         Behavior: Behavior normal.         Vents:  Vent Mode: A/C (02/09/22 0445)  Set Rate: 2 BPM (02/09/22 0445)  Vt Set: 480 mL (02/09/22 0445)  Pressure Support: 15 cmH20 (02/09/22 0445)  PEEP/CPAP: 5 cmH20 (02/09/22 0445)  Oxygen Concentration (%): 40 (02/08/22 0545)  Peak Airway Pressure: 19 cmH2O (02/09/22 0445)  Total Ve: 10.1 mL (02/09/22 0445)  F/VT Ratio<105 (RSBI): (!) 82.07 (02/07/22 1630)    Lines/Drains/Airways     Peripherally Inserted Central Catheter Line            PICC Double Lumen 01/05/22 left basilic 35 days          Central Venous Catheter Line                 Hemodialysis Catheter 12/30/21 0900 right internal jugular 40 days          Drain                 NG/OG Tube [place] sump 18 Fr. Left nostril -- days         Colostomy 12/18/21 1030 Descending/sigmoid LUQ 52 days          Airway                 Surgical Airway 01/04/22 Shiley 36 days                Significant Labs:    CBC/Anemia Profile:  No results for input(s): WBC, HGB, HCT, PLT, MCV, RDW, IRON, FERRITIN, RETIC, FOLATE, [id number], OCCULTBLOOD in the last 48 hours.     Chemistries:  No results for input(s): NA, K, CL, CO2, BUN, CREATININE, CALCIUM, ALBUMIN, PROT, BILITOT, ALKPHOS, ALT, AST, GLUCOSE, MG, PHOS in the last 48 hours.    All pertinent labs within the past 24 hours have been reviewed.    Significant Imaging:  I have reviewed all pertinent imaging results/findings within the past 24 hours.    Assessment/Plan:     * Denice gangrene  - s/p multiple debridements  - wound vac in place  - ID consulted for antibiotic management: He was treated with rocephin, daptomycin, clindamycin. 12/21- change  clindamycin to ampicillin and treat for another week  - remains on ampicillin, this was changed to merrem 1/9  - pathology with fungal species consistent with mucormycosis initiated on amphotericin- mucormycosis with up to 50% mortality rate  - plan for OR tomorrow  Patient receiving amphotericin having spells of bradycardia will repeat blood cultures and do a echo to look for endocarditis  1/11- echo reviewed and no obvious vegetations. Amphotericin started on 1/7. The patient went to OR on 1/10 for debridement. Surgery note reviewed and appreciated.  1/12- back to OR today.  1/13- OR note reviewed. No purulence noted.    1/27- wound vac in place   2/1- seems to be improving  2/5 wound vac in place  2/9- antibiotics and amphotericin completed      On mechanically assisted ventilation  - 12/14 intubated, tracheostomy 1/4  Weaning waxes and wanes     1/27- continue to increase as tolerated. Currently doing 4 hours SIMV with reduced rate of 4. - will transition his fentanyl infusion to a patch of 50mcg today    Continue weaning from the vent  1/31- plan for 5 hours tid today. Continue to increase as tolerated  2/1- He was not able to complete trial last night. We will continue with current plan and try again today  2/2- had issues yesterday due to tachycardia and anxiety. I have added ativan prn  2/3 was not able to do any trials yesterday. I am going to rest on the ventilator today  2/4 plan to resume some trials following HD today  2/5- still having issues with weaning trials. A lot of secretions. I have added mucinex.  2/6 on a trial this am and doing ok. Will continue throughout today as tolerated  2/7- plan to keep same weaning trials today  2/8- weaning has been slow. He is doing ok today with simv with reduce rate of 2.  02/09/2022 continue weaning    RAHAT (acute kidney injury)  - tunneled HD line placement 12/30  Continues with dialysis              Hypertension  Currently hypotensive. Holding any  antihypertensives  BP looks a little better this am. Weaning pressor as tolerated  1/21- No longer on pressor.   1/31- hemodynamically stable  Blood pressure not an issue    Disseminated mucormycosis  Defer to Dr. Hall on all antibiotic choices---Her note has been reviewed and is appreciated.     Amphotericin until 02/03   zosyn and gent for pseudomonal double coverage until 02/01 2/4 treatment completed    Type 2 diabetes mellitus without complication, without long-term current use of insulin  - continue basal/bolus insulin  -accuchecks are reasonable currently      Acute blood loss anemia  - H/H down to 5.5/16 - pt had bleeding from his HD insertion site yesterday this has now resolved.   - will transfuse 2 units PRBCs on HD  1/2- 9.7/27.5 - repeat CBC in AM  1/4- Hgb is 7.2. Not sure if he will be transfused during surgery. IF not we will try to get  Him transfused with next HD  01/06/2021 transfuse 1 unit of packed red blood cells today  01/10/2021 needs a unit of blood  1/11- repeat H&H in am. Continue to transfuse as needed.  1/13- Hgb is 5. Transfusing today. Post transfusion H&H  1/14- CBC pending for today  1/15- 6/18 - hemodynamically stable, recheck in AM   1/16-- 6.1/17.1 - critical low blood shortage in hospital - holding transfusion until next HD since he is hemodynamically stable and not actively bleeding   01/17/2021 day transfuse blood on dialysis today  01/18/2020 to hemoglobin up to 9 after transfusion  01/25/2022 no signs of bleeding most recent hemoglobin 8  01/29/2022 patient will need transfusion today  1/31- Hgb 6.7 yesterday. Can transfuse with next HD session  2/2- H&H is pending  2/3- plan to transfuse 1 unit of PRBC's  2/4 Hgb is 6.9 today. We will transfuse another unit today  2/7 8.4 now                 Jose Urban MD  Pulmonology  [facility]

## 2022-02-09 NOTE — ASSESSMENT & PLAN NOTE
- s/p multiple debridements  - wound vac in place  - ID consulted for antibiotic management: He was treated with rocephin, daptomycin, clindamycin. 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 Amphotericin started on 1/7. The patient went to OR on 1/10 for debridement. Surgery note reviewed and appreciated.  1/12- back to OR today.  1/13- OR note reviewed. No purulence noted.    1/27- wound vac in place   2/1- seems to be improving  2/5 wound vac in place  2/9- antibiotics and amphotericin completed

## 2022-02-10 NOTE — PROGRESS NOTES
[facility]  Pulmonology  Progress Note    Patient Name: Og Garcia  MRN: [id number]  Admission Date: 12/23/2021  Hospital Length of Stay: 49 days  Code Status: Prior  Attending Provider: Cecil Abernathy DO  Primary Care Provider: Hector Arndt DNP, FNP-C   Principal Problem: Denice gangrene    Subjective:     Interval History:  Patient without complaints    Objective:     Vital Signs (Most Recent):  Temp: 99.6 °F (37.6 °C) (02/10/22 0300)  Pulse: 104 (02/10/22 0300)  Resp: (!) 22 (02/10/22 0300)  BP: 124/61 (02/10/22 0300)  SpO2: 100 % (02/10/22 0300) Vital Signs (24h Range):  Temp:  [98.3 °F (36.8 °C)-99.6 °F (37.6 °C)] 99.6 °F (37.6 °C)  Pulse:  [] 104  Resp:  [11-44] 22  SpO2:  [90 %-100 %] 100 %  BP: (100-145)/(50-80) 124/61     Weight: 90.8 kg (200 lb 2.8 oz)  Body mass index is 27.92 kg/m².      Intake/Output Summary (Last 24 hours) at 2/10/2022 0522  Last data filed at 2/9/2022 1740  Gross per 24 hour   Intake 1670 ml   Output 2850 ml   Net -1180 ml       Physical Exam  Vitals reviewed.   Constitutional:       Appearance: Normal appearance.      Interventions: He is not intubated.  HENT:      Head: Normocephalic and atraumatic.      Nose: Nose normal.      Mouth/Throat:      Mouth: Mucous membranes are dry.      Pharynx: Oropharynx is clear.   Eyes:      Extraocular Movements: Extraocular movements intact.      Conjunctiva/sclera: Conjunctivae normal.      Pupils: Pupils are equal, round, and reactive to light.   Cardiovascular:      Rate and Rhythm: Normal rate.      Heart sounds: Normal heart sounds. No murmur heard.      Pulmonary:      Effort: Pulmonary effort is normal. He is not intubated.      Breath sounds: Normal breath sounds.   Abdominal:      General: Abdomen is flat. Bowel sounds are normal.      Palpations: Abdomen is soft.   Musculoskeletal:         General: Normal range of motion.      Cervical back: Normal range of motion and neck supple.      Right  lower leg: No edema.      Left lower leg: No edema.   Skin:     General: Skin is warm and dry.      Capillary Refill: Capillary refill takes less than 2 seconds.   Neurological:      General: No focal deficit present.      Mental Status: He is alert and oriented to person, place, and time.   Psychiatric:         Mood and Affect: Mood normal.         Behavior: Behavior normal.         Vents:  Vent Mode: SIMV (02/09/22 2330)  Set Rate: 2 BPM (02/09/22 2330)  Vt Set: 480 mL (02/09/22 2330)  Pressure Support: 15 cmH20 (02/09/22 2330)  PEEP/CPAP: 5 cmH20 (02/09/22 2330)  Oxygen Concentration (%): (P) 40 (02/10/22 0000)  Peak Airway Pressure: 20 cmH2O (02/09/22 2330)  Total Ve: 10.4 mL (02/09/22 2330)  F/VT Ratio<105 (RSBI): (!) 64.36 (02/09/22 2000)    Lines/Drains/Airways     Peripherally Inserted Central Catheter Line            PICC Double Lumen 01/05/22 left basilic 36 days          Central Venous Catheter Line                 Hemodialysis Catheter 12/30/21 0900 right internal jugular 41 days          Drain                 NG/OG Tube [place] sump 18 Fr. Left nostril -- days         Colostomy 12/18/21 1030 Descending/sigmoid LUQ 53 days          Airway                 Surgical Airway 01/04/22 Shiley 37 days                Significant Labs:    CBC/Anemia Profile:  No results for input(s): WBC, HGB, HCT, PLT, MCV, RDW, IRON, FERRITIN, RETIC, FOLATE, [id number], OCCULTBLOOD in the last 48 hours.     Chemistries:  No results for input(s): NA, K, CL, CO2, BUN, CREATININE, CALCIUM, ALBUMIN, PROT, BILITOT, ALKPHOS, ALT, AST, GLUCOSE, MG, PHOS in the last 48 hours.    All pertinent labs within the past 24 hours have been reviewed.    Significant Imaging:  I have reviewed all pertinent imaging results/findings within the past 24 hours.    Assessment/Plan:     * Denice gangrene  - s/p multiple debridements  - wound vac in place  - ID consulted for antibiotic management: He was treated with rocephin, daptomycin, clindamycin.  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 Amphotericin started on 1/7. The patient went to OR on 1/10 for debridement. Surgery note reviewed and appreciated.  1/12- back to OR today.  1/13- OR note reviewed. No purulence noted.    1/27- wound vac in place   2/1- seems to be improving  2/5 wound vac in place  2/9- antibiotics and amphotericin completed      On mechanically assisted ventilation  - 12/14 intubated, tracheostomy 1/4  Weaning waxes and wanes     1/27- continue to increase as tolerated. Currently doing 4 hours SIMV with reduced rate of 4. - will transition his fentanyl infusion to a patch of 50mcg today    Continue weaning from the vent  1/31- plan for 5 hours tid today. Continue to increase as tolerated  2/1- He was not able to complete trial last night. We will continue with current plan and try again today  2/2- had issues yesterday due to tachycardia and anxiety. I have added ativan prn  2/3 was not able to do any trials yesterday. I am going to rest on the ventilator today  2/4 plan to resume some trials following HD today  2/5- still having issues with weaning trials. A lot of secretions. I have added mucinex.  2/6 on a trial this am and doing ok. Will continue throughout today as tolerated  2/7- plan to keep same weaning trials today  2/8- weaning has been slow. He is doing ok today with simv with reduce rate of 2.  02/10/2022 continue weaning    RAHAT (acute kidney injury)  - tunneled HD line placement 12/30  Continues with dialysis              Type 2 diabetes mellitus without complication,  without long-term current use of insulin  - continue basal/bolus insulin  -accuchecks are reasonable currently      Acute blood loss anemia  - H/H down to 5.5/16 - pt had bleeding from his HD insertion site yesterday this has now resolved.   - will transfuse 2 units PRBCs on HD  1/2- 9.7/27.5 - repeat CBC in AM  1/4- Hgb is 7.2. Not sure if he will be transfused during surgery. IF not we will try to get  Him transfused with next HD  01/06/2021 transfuse 1 unit of packed red blood cells today  01/10/2021 needs a unit of blood  1/11- repeat H&H in am. Continue to transfuse as needed.  1/13- Hgb is 5. Transfusing today. Post transfusion H&H  1/14- CBC pending for today  1/15- 6/18 - hemodynamically stable, recheck in AM   1/16-- 6.1/17.1 - critical low blood shortage in hospital - holding transfusion until next HD since he is hemodynamically stable and not actively bleeding   01/17/2021 day transfuse blood on dialysis today  01/18/2020 to hemoglobin up to 9 after transfusion  01/25/2022 no signs of bleeding most recent hemoglobin 8  01/29/2022 patient will need transfusion today  1/31- Hgb 6.7 yesterday. Can transfuse with next HD session  2/2- H&H is pending  2/3- plan to transfuse 1 unit of PRBC's  2/4 Hgb is 6.9 today. We will transfuse another unit today  2/7 8.4 now                 Jose Urban MD  Pulmonology  [facility]

## 2022-02-10 NOTE — SUBJECTIVE & OBJECTIVE
Interval History:  Patient without complaints    Objective:     Vital Signs (Most Recent):  Temp: 99.6 °F (37.6 °C) (02/10/22 0300)  Pulse: 104 (02/10/22 0300)  Resp: (!) 22 (02/10/22 0300)  BP: 124/61 (02/10/22 0300)  SpO2: 100 % (02/10/22 0300) Vital Signs (24h Range):  Temp:  [98.3 °F (36.8 °C)-99.6 °F (37.6 °C)] 99.6 °F (37.6 °C)  Pulse:  [] 104  Resp:  [11-44] 22  SpO2:  [90 %-100 %] 100 %  BP: (100-145)/(50-80) 124/61     Weight: 90.8 kg (200 lb 2.8 oz)  Body mass index is 27.92 kg/m².      Intake/Output Summary (Last 24 hours) at 2/10/2022 0522  Last data filed at 2/9/2022 1740  Gross per 24 hour   Intake 1670 ml   Output 2850 ml   Net -1180 ml       Physical Exam  Vitals reviewed.   Constitutional:       Appearance: Normal appearance.      Interventions: He is not intubated.  HENT:      Head: Normocephalic and atraumatic.      Nose: Nose normal.      Mouth/Throat:      Mouth: Mucous membranes are dry.      Pharynx: Oropharynx is clear.   Eyes:      Extraocular Movements: Extraocular movements intact.      Conjunctiva/sclera: Conjunctivae normal.      Pupils: Pupils are equal, round, and reactive to light.   Cardiovascular:      Rate and Rhythm: Normal rate.      Heart sounds: Normal heart sounds. No murmur heard.      Pulmonary:      Effort: Pulmonary effort is normal. He is not intubated.      Breath sounds: Normal breath sounds.   Abdominal:      General: Abdomen is flat. Bowel sounds are normal.      Palpations: Abdomen is soft.   Musculoskeletal:         General: Normal range of motion.      Cervical back: Normal range of motion and neck supple.      Right lower leg: No edema.      Left lower leg: No edema.   Skin:     General: Skin is warm and dry.      Capillary Refill: Capillary refill takes less than 2 seconds.   Neurological:      General: No focal deficit present.      Mental Status: He is alert and oriented to person, place, and time.   Psychiatric:         Mood and Affect: Mood normal.          Behavior: Behavior normal.         Vents:  Vent Mode: SIMV (02/09/22 2330)  Set Rate: 2 BPM (02/09/22 2330)  Vt Set: 480 mL (02/09/22 2330)  Pressure Support: 15 cmH20 (02/09/22 2330)  PEEP/CPAP: 5 cmH20 (02/09/22 2330)  Oxygen Concentration (%): (P) 40 (02/10/22 0000)  Peak Airway Pressure: 20 cmH2O (02/09/22 2330)  Total Ve: 10.4 mL (02/09/22 2330)  F/VT Ratio<105 (RSBI): (!) 64.36 (02/09/22 2000)    Lines/Drains/Airways     Peripherally Inserted Central Catheter Line            PICC Double Lumen 01/05/22 left basilic 36 days          Central Venous Catheter Line                 Hemodialysis Catheter 12/30/21 0900 right internal jugular 41 days          Drain                 NG/OG Tube [place] sump 18 Fr. Left nostril -- days         Colostomy 12/18/21 1030 Descending/sigmoid LUQ 53 days          Airway                 Surgical Airway 01/04/22 Shiley 37 days                Significant Labs:    CBC/Anemia Profile:  No results for input(s): WBC, HGB, HCT, PLT, MCV, RDW, IRON, FERRITIN, RETIC, FOLATE, [id number], OCCULTBLOOD in the last 48 hours.     Chemistries:  No results for input(s): NA, K, CL, CO2, BUN, CREATININE, CALCIUM, ALBUMIN, PROT, BILITOT, ALKPHOS, ALT, AST, GLUCOSE, MG, PHOS in the last 48 hours.    All pertinent labs within the past 24 hours have been reviewed.    Significant Imaging:  I have reviewed all pertinent imaging results/findings within the past 24 hours.

## 2022-02-10 NOTE — PLAN OF CARE
Problem: Adult Inpatient Plan of Care  Goal: Plan of Care Review  Outcome: Ongoing, Progressing  Goal: Absence of Hospital-Acquired Illness or Injury  Outcome: Ongoing, Progressing  Goal: Optimal Comfort and Wellbeing  Outcome: Ongoing, Progressing     Problem: Adjustment to Illness (Sepsis/Septic Shock)  Goal: Optimal Coping  Outcome: Ongoing, Progressing     Problem: Bleeding (Sepsis/Septic Shock)  Goal: Absence of Bleeding  Outcome: Ongoing, Progressing     Problem: Glycemic Control Impaired (Sepsis/Septic Shock)  Goal: Blood Glucose Level Within Desired Range  Outcome: Ongoing, Progressing     Problem: Nutrition Impaired (Sepsis/Septic Shock)  Goal: Optimal Nutrition Intake  Outcome: Ongoing, Progressing     Problem: Infection  Goal: Absence of Infection Signs and Symptoms  Outcome: Ongoing, Progressing     Problem: Fall Injury Risk  Goal: Absence of Fall and Fall-Related Injury  Outcome: Ongoing, Progressing     Problem: Inability to Wean (Mechanical Ventilation, Invasive)  Goal: Mechanical Ventilation Liberation  Outcome: Ongoing, Progressing

## 2022-02-10 NOTE — PROGRESS NOTES
The patient is resting.  No acute changes.  He is in the middle of dressing changes.    Vitals noted: temp 99.4 bp 110/51  CV:RR  LUNGS: Clear anterior  AB:soft positive bowel sounds  EXT: no edema    1.  ESRD  2.  Respiratory failure  3.  Debility  4.  Decubiti  5.  Continue with scheduled hemodialysis for this patient.

## 2022-02-10 NOTE — ASSESSMENT & PLAN NOTE
- 12/14 intubated, tracheostomy 1/4  Weaning waxes and wanes     1/27- continue to increase as tolerated. Currently doing 4 hours SIMV with reduced rate of 4. - will transition his fentanyl infusion to a patch of 50mcg today    Continue weaning from the vent  1/31- plan for 5 hours tid today. Continue to increase as tolerated  2/1- He was not able to complete trial last night. We will continue with current plan and try again today  2/2- had issues yesterday due to tachycardia and anxiety. I have added ativan prn  2/3 was not able to do any trials yesterday. I am going to rest on the ventilator today  2/4 plan to resume some trials following HD today  2/5- still having issues with weaning trials. A lot of secretions. I have added mucinex.  2/6 on a trial this am and doing ok. Will continue throughout today as tolerated  2/7- plan to keep same weaning trials today  2/8- weaning has been slow. He is doing ok today with simv with reduce rate of 2.  02/10/2022 continue weaning

## 2022-02-10 NOTE — PROGRESS NOTES
Wound care note;  Patient skin was evaluated today  Patient in bed, eyes open, on ventilator.  Abdominal wound with red granular tissue, edges granulating inward, improving . Cont NPWT   Sacral wound with pink tissue and Rectal wound with red granular tissue, cont Dakins moist gauze .  Right hand and Left posterior heel with dark tan tissue , applied silvadene cream to both areas.   Cont turn protocol  Waffle boots  On low air loss mattress

## 2022-02-11 NOTE — PROGRESS NOTES
[facility]  Adult Nutrition  Follow-up Note         Reason for Assessment  Reason For Assessment: RD follow-up  Nutrition Risk Screen: tube feeding or parenteral nutrition  Malnutrition  Is Patient Malnourished: No  Nutrition Diagnosis  Inadequate oral intake   related to Decreased ability to consume sufficient energy as evidenced by pt on vent    Nutrition Diagnosis Status: Chronic/ continues      Nutrition Risk  Level of Risk/Frequency of Follow-up: high   Chewing or Swallowing Difficulty?: Swallowing difficulty  Estimated/Assessed Needs  RMR ([place]-St. Jeor Equation): 1670.13 Activity Factor: 1 Injury Factor: 1.2   Total Ve: 11.9 mL Temp: 99.3 °F (37.4 °C)Oral  Weight Used For Calorie Calculations: 86.8 kg (191 lb 5.8 oz)   Energy Need Method: [facility] Energy Calorie Requirements (kcal): 2078  Weight Used For Protein Calculations: 86.8 kg (191 lb 5.8 oz)  Protein Requirements: 104-122  Estimated Fluid Requirement Method: RDA Method Fluid Requirements (mL): 2078  RDA Method (mL): 2078     Nutrition Prescription / Recommendations  Recommendation/Intervention: NG tube feeding: Vital AF @ 75ml/hr; H20 flush of 50ml q 4hr  Goals: pt will tolerate tube feeding; Pt will meet estimated nutritional needs; wound healing  Nutrition Goal Status: progressing towards goal  Communication of RD Recs: reviewed with physician  Current Diet Order: NPO/ NG tube feeding  Nutrition Order Comments: NG tube feeding: Vital AF @ 75ml/hr; H20 flush of 50ml q 4hr  Current Nutrition Support Formula Ordered: Vital AF 1.2  Current Nutrition Support Rate Ordered: 75 (ml)  Current Nutrition Support Frequency Ordered: hourly  Recommended Diet: Enteral Nutrition NG tube feeding: Vital AF @ 75ml/hr; H20 flush of 50ml q 4hr  Recommended Oral Supplement: No Oral Supplements  Is Nutrition Support Recommended: No  Is Education Recommended: No  Monitor and Evaluation  % current Intake: Enteral Nutrition at goal  % intake to  meet estimated needs: Enteral Nutrition   Food and Nutrient Intake: enteral nutrition intake  Food and Nutrient Adminstration: enteral and parenteral nutrition administration  Anthropometric Measurements: height/length,weight,weight change,body mass index  Biochemical Data, Medical Tests and Procedures: electrolyte and renal panel,glucose/endocrine profile  Nutrition-Focused Physical Findings: skin  Enteral Calories (kcal): 2160  Enteral Protein (gm): 135  Enteral (Free Water) Fluid (mL): 1458  Free Water Flush Fluid (mL): 300  Parenteral Calories (kcal): 845  Parenteral Protein (gm): 48  Parenteral Fluid (mL): 960  Lipid Calories (kcals): 500 kcals  Other Calories (kcal): 528 (propofol)  Total Calories (kcal): 2160  Total Calories (kcal/kg): 2612  % Kcal Needs: 100  Total Protein (gm): 135  % Protein Needs: 100  IV Fluid (mL): 1764  Total Fluid Intake (mL): 1758  Energy Calories Required: meeting needs  Protein Required: meeting needs  Fluid Required: meeting needs  Tolerance: tolerating  Current Medical Diagnosis and Past Medical History  Diagnosis: gastrointestinal disease,infection/sepsis  Past Medical History:   Diagnosis Date    Hyperlipidemia     Hypertension      Nutrition/Diet History  Spiritual, Cultural Beliefs, Buddhist Practices, Values that Affect Care: no  Food Allergies: NKFA  Factors Affecting Nutritional Intake: None identified at this time  Lab/Procedures/Meds  No results for input(s): NA, K, BUN, CREATININE, GLU, CALCIUM, ALBUMIN, CL, ALT, AST, PHOS in the last 72 hours.  Last A1c: No results found for: HGBA1C  Lab Results   Component Value Date    RBC 2.80 (L) 02/07/2022    HGB 8.4 (L) 02/07/2022    HCT 26.2 (L) 02/07/2022    MCV 93.6 02/07/2022    MCH 30.0 02/07/2022    MCHC 32.1 02/07/2022     Pertinent Labs Reviewed: reviewed  Pertinent Labs Comments: Hct26.2, BUN 62, Creat 2.17, Glu 117, Alb .9  Pertinent Medications Reviewed: reviewed  Pertinent Medications Comments: heparin,  "insulin  Anthropometrics  Temp: 99.3 °F (37.4 °C)  Height Method: Stated  Height: 5' 11" (180.3 cm)  Height (inches): 71 in  Weight Method: Bed Scale  Weight: 86.8 kg (191 lb 5.8 oz)  Weight (lb): 191.36 lb  Ideal Body Weight (IBW), Male: 172 lb  % Ideal Body Weight, Male (lb): 115.36 %  BMI (Calculated): 26.7  BMI Grade: 25 - 29.9 - overweight     Nutrition by Nursing  Diet/Nutrition Received: tube feeding     Diet/Feeding Assistance: none  Diet/Feeding Tolerance: other (see comments)  Last Bowel Movement: 02/11/22       NG/OG Tube [place] sump 18 Fr. Left nostril-Feeding Type: continuous       NG/OG Tube [place] sump 18 Fr. Left nostril-Current Rate (mL/hr): 75 mL/hr       NG/OG Tube [place] sump 18 Fr. Left nostril-Goal Rate (mL/hr): 75 mL/hr       NG/OG Tube [place] sump 18 Fr. Left nostril-Formula Name: Vital AF  Nutrition Follow-Up  RD Follow-up?: Yes  Assessment and Plan  No new Assessment & Plan notes have been filed under this hospital service since the last note was generated.  Service: Nutrition     "

## 2022-02-11 NOTE — ASSESSMENT & PLAN NOTE
Defer to Dr. Hall on all antibiotic choices---Her note has been reviewed and is appreciated.     Amphotericin until 02/03   zosyn and gent for pseudomonal double coverage until 02/01 2/4 treatment completed   Dressing: dry sterile dressing

## 2022-02-11 NOTE — PROGRESS NOTES
[facility]  Pulmonology  Progress Note    Patient Name: Og Garcia  MRN: [id number]  Admission Date: 12/23/2021  Hospital Length of Stay: 50 days  Code Status: Prior  Attending Provider: Cecil Abernathy DO  Primary Care Provider: Hector Arndt DNP, FNP-C   Principal Problem: Denice gangrene    Subjective:     Interval History:  Opens eyes does not follow commands    Objective:     Vital Signs (Most Recent):  Temp: 98.4 °F (36.9 °C) (02/11/22 0300)  Pulse: 90 (02/11/22 0600)  Resp: 11 (02/11/22 0600)  BP: (!) 114/56 (02/11/22 0600)  SpO2: 100 % (02/11/22 0600) Vital Signs (24h Range):  Temp:  [98.4 °F (36.9 °C)-99.7 °F (37.6 °C)] 98.4 °F (36.9 °C)  Pulse:  [] 90  Resp:  [11-33] 11  SpO2:  [98 %-100 %] 100 %  BP: ()/(51-67) 114/56     Weight: 86.8 kg (191 lb 5.8 oz)  Body mass index is 26.69 kg/m².      Intake/Output Summary (Last 24 hours) at 2/11/2022 0654  Last data filed at 2/11/2022 0600  Gross per 24 hour   Intake 2290 ml   Output 700 ml   Net 1590 ml       Physical Exam  Vitals reviewed.   Constitutional:       Appearance: Normal appearance.      Interventions: He is intubated.   HENT:      Head: Normocephalic and atraumatic.      Nose: Nose normal.      Mouth/Throat:      Mouth: Mucous membranes are dry.      Pharynx: Oropharynx is clear.   Eyes:      Extraocular Movements: Extraocular movements intact.      Conjunctiva/sclera: Conjunctivae normal.      Pupils: Pupils are equal, round, and reactive to light.   Cardiovascular:      Rate and Rhythm: Normal rate.      Heart sounds: Normal heart sounds. No murmur heard.      Pulmonary:      Effort: Pulmonary effort is normal. He is intubated.      Breath sounds: Normal breath sounds.   Abdominal:      General: Abdomen is flat. Bowel sounds are normal.      Palpations: Abdomen is soft.   Musculoskeletal:         General: Normal range of motion.      Cervical back: Normal range of motion and neck supple.      Right  lower leg: No edema.      Left lower leg: No edema.   Skin:     General: Skin is warm and dry.      Capillary Refill: Capillary refill takes less than 2 seconds.   Neurological:      General: No focal deficit present.      Mental Status: He is alert.         Vents:  Vent Mode: CPAP/PSV (02/11/22 0356)  Set Rate: 16 BPM (02/10/22 1929)  Vt Set: 480 mL (02/10/22 1929)  Pressure Support: 12 cmH20 (02/11/22 0356)  PEEP/CPAP: 5 cmH20 (02/11/22 0356)  Oxygen Concentration (%): 40 (02/11/22 0400)  Peak Airway Pressure: 17 cmH2O (02/11/22 0356)  Total Ve: 11.5 mL (02/11/22 0356)  F/VT Ratio<105 (RSBI): (!) 64.36 (02/09/22 2000)    Lines/Drains/Airways     Peripherally Inserted Central Catheter Line            PICC Double Lumen 01/05/22 left basilic 37 days          Central Venous Catheter Line                 Hemodialysis Catheter 12/30/21 0900 right internal jugular 42 days          Drain                 NG/OG Tube [place] sump 18 Fr. Left nostril -- days         Colostomy 12/18/21 1030 Descending/sigmoid LUQ 54 days          Airway                 Surgical Airway 01/04/22 [place] 38 days                Significant Labs:    CBC/Anemia Profile:  No results for input(s): WBC, HGB, HCT, PLT, MCV, RDW, IRON, FERRITIN, RETIC, FOLATE, [id number], OCCULTBLOOD in the last 48 hours.     Chemistries:  No results for input(s): NA, K, CL, CO2, BUN, CREATININE, CALCIUM, ALBUMIN, PROT, BILITOT, ALKPHOS, ALT, AST, GLUCOSE, MG, PHOS in the last 48 hours.    All pertinent labs within the past 24 hours have been reviewed.    Significant Imaging:  I have reviewed all pertinent imaging results/findings within the past 24 hours.    Assessment/Plan:     * Denice gangrene  - s/p multiple debridements  - wound vac in place  - ID consulted for antibiotic management: He was treated with rocephin, daptomycin, clindamycin. 12/21- change clindamycin to ampicillin and treat for another week  - remains on ampicillin, this was changed to merrem 1/9  -  pathology with fungal species consistent with mucormycosis initiated on amphotericin- mucormycosis with up to 50% mortality rate  - plan for OR tomorrow  Patient receiving amphotericin having spells of bradycardia will repeat blood cultures and do a echo to look for endocarditis  1/11- echo reviewed and no obvious vegetations. Amphotericin started on 1/7. The patient went to OR on 1/10 for debridement. Surgery note reviewed and appreciated.  1/12- back to OR today.  1/13- OR note reviewed. No purulence noted.    1/27- wound vac in place   2/1- seems to be improving  2/5 wound vac in place  2/9- antibiotics and amphotericin completed      On mechanically assisted ventilation  - 12/14 intubated, tracheostomy 1/4  Weaning waxes and wanes     1/27- continue to increase as tolerated. Currently doing 4 hours SIMV with reduced rate of 4. - will transition his fentanyl infusion to a patch of 50mcg today    Continue weaning from the vent  1/31- plan for 5 hours tid today. Continue to increase as tolerated  2/1- He was not able to complete trial last night. We will continue with current plan and try again today  2/2- had issues yesterday due to tachycardia and anxiety. I have added ativan prn  2/3 was not able to do any trials yesterday. I am going to rest on the ventilator today  2/4 plan to resume some trials following HD today  2/5- still having issues with weaning trials. A lot of secretions. I have added mucinex.  2/6 on a trial this am and doing ok. Will continue throughout today as tolerated  2/7- plan to keep same weaning trials today  2/8- weaning has been slow. He is doing ok today with simv with reduce rate of 2.  02/11/2022 continue weaning    RAHAT (acute kidney injury)  - tunneled HD line placement 12/30  Continues with dialysis              Disseminated mucormycosis  Defer to Dr. Hall on all antibiotic choices---Her note has been reviewed and is appreciated.     Amphotericin until 02/03   zosyn and gent for  pseudomonal double coverage until 02/01    2/4 treatment completed    Type 2 diabetes mellitus without complication, without long-term current use of insulin  - continue basal/bolus insulin  -accuchecks are reasonable currently                   Jose Urban MD  Pulmonology  [facility]

## 2022-02-11 NOTE — ASSESSMENT & PLAN NOTE
- 12/14 intubated, tracheostomy 1/4  Weaning waxes and wanes     1/27- continue to increase as tolerated. Currently doing 4 hours SIMV with reduced rate of 4. - will transition his fentanyl infusion to a patch of 50mcg today    Continue weaning from the vent  1/31- plan for 5 hours tid today. Continue to increase as tolerated  2/1- He was not able to complete trial last night. We will continue with current plan and try again today  2/2- had issues yesterday due to tachycardia and anxiety. I have added ativan prn  2/3 was not able to do any trials yesterday. I am going to rest on the ventilator today  2/4 plan to resume some trials following HD today  2/5- still having issues with weaning trials. A lot of secretions. I have added mucinex.  2/6 on a trial this am and doing ok. Will continue throughout today as tolerated  2/7- plan to keep same weaning trials today  2/8- weaning has been slow. He is doing ok today with simv with reduce rate of 2.  02/11/2022 continue weaning

## 2022-02-11 NOTE — SUBJECTIVE & OBJECTIVE
Interval History:  Opens eyes does not follow commands    Objective:     Vital Signs (Most Recent):  Temp: 98.4 °F (36.9 °C) (02/11/22 0300)  Pulse: 90 (02/11/22 0600)  Resp: 11 (02/11/22 0600)  BP: (!) 114/56 (02/11/22 0600)  SpO2: 100 % (02/11/22 0600) Vital Signs (24h Range):  Temp:  [98.4 °F (36.9 °C)-99.7 °F (37.6 °C)] 98.4 °F (36.9 °C)  Pulse:  [] 90  Resp:  [11-33] 11  SpO2:  [98 %-100 %] 100 %  BP: ()/(51-67) 114/56     Weight: 86.8 kg (191 lb 5.8 oz)  Body mass index is 26.69 kg/m².      Intake/Output Summary (Last 24 hours) at 2/11/2022 0654  Last data filed at 2/11/2022 0600  Gross per 24 hour   Intake 2290 ml   Output 700 ml   Net 1590 ml       Physical Exam  Vitals reviewed.   Constitutional:       Appearance: Normal appearance.      Interventions: He is intubated.   HENT:      Head: Normocephalic and atraumatic.      Nose: Nose normal.      Mouth/Throat:      Mouth: Mucous membranes are dry.      Pharynx: Oropharynx is clear.   Eyes:      Extraocular Movements: Extraocular movements intact.      Conjunctiva/sclera: Conjunctivae normal.      Pupils: Pupils are equal, round, and reactive to light.   Cardiovascular:      Rate and Rhythm: Normal rate.      Heart sounds: Normal heart sounds. No murmur heard.      Pulmonary:      Effort: Pulmonary effort is normal. He is intubated.      Breath sounds: Normal breath sounds.   Abdominal:      General: Abdomen is flat. Bowel sounds are normal.      Palpations: Abdomen is soft.   Musculoskeletal:         General: Normal range of motion.      Cervical back: Normal range of motion and neck supple.      Right lower leg: No edema.      Left lower leg: No edema.   Skin:     General: Skin is warm and dry.      Capillary Refill: Capillary refill takes less than 2 seconds.   Neurological:      General: No focal deficit present.      Mental Status: He is alert.         Vents:  Vent Mode: CPAP/PSV (02/11/22 0356)  Set Rate: 16 BPM (02/10/22 1929)  Vt Set: 480  mL (02/10/22 1929)  Pressure Support: 12 cmH20 (02/11/22 0356)  PEEP/CPAP: 5 cmH20 (02/11/22 0356)  Oxygen Concentration (%): 40 (02/11/22 0400)  Peak Airway Pressure: 17 cmH2O (02/11/22 0356)  Total Ve: 11.5 mL (02/11/22 0356)  F/VT Ratio<105 (RSBI): (!) 64.36 (02/09/22 2000)    Lines/Drains/Airways     Peripherally Inserted Central Catheter Line            PICC Double Lumen 01/05/22 left basilic 37 days          Central Venous Catheter Line                 Hemodialysis Catheter 12/30/21 0900 right internal jugular 42 days          Drain                 NG/OG Tube [place] sump 18 Fr. Left nostril -- days         Colostomy 12/18/21 1030 Descending/sigmoid LUQ 54 days          Airway                 Surgical Airway 01/04/22 [place] 38 days                Significant Labs:    CBC/Anemia Profile:  No results for input(s): WBC, HGB, HCT, PLT, MCV, RDW, IRON, FERRITIN, RETIC, FOLATE, [id number], OCCULTBLOOD in the last 48 hours.     Chemistries:  No results for input(s): NA, K, CL, CO2, BUN, CREATININE, CALCIUM, ALBUMIN, PROT, BILITOT, ALKPHOS, ALT, AST, GLUCOSE, MG, PHOS in the last 48 hours.    All pertinent labs within the past 24 hours have been reviewed.    Significant Imaging:  I have reviewed all pertinent imaging results/findings within the past 24 hours.

## 2022-02-11 NOTE — PLAN OF CARE
Problem: Adult Inpatient Plan of Care  Goal: Plan of Care Review  Outcome: Ongoing, Progressing  Goal: Patient-Specific Goal (Individualized)  Outcome: Ongoing, Progressing  Goal: Optimal Comfort and Wellbeing  Outcome: Ongoing, Progressing     Problem: Adjustment to Illness (Sepsis/Septic Shock)  Goal: Optimal Coping  Outcome: Ongoing, Progressing     Problem: Bleeding (Sepsis/Septic Shock)  Goal: Absence of Bleeding  Outcome: Ongoing, Progressing     Problem: Nutrition Impaired (Sepsis/Septic Shock)  Goal: Optimal Nutrition Intake  Outcome: Ongoing, Progressing

## 2022-02-11 NOTE — PROGRESS NOTES
The patient is resting.  No acute changes.  He moves his head and today, he is mouthing some words.    Vitals noted: temp 100.1 bp 129/64  CV:RR  LUNGS: Clear anterior  AB:soft positive bowel sounds, abdominal wound  EXT: no edema    1.  ESRD  2.  Respiratory failure  3.  Debility  4.  Decubiti  5.  Continue with scheduled hemodialysis for this patient.

## 2022-02-11 NOTE — PLAN OF CARE
Per IDT meeting. Pt will continue current plan of care. Pt remains on vent, has NPWT and NG feedings. SS following,.

## 2022-02-12 NOTE — ASSESSMENT & PLAN NOTE
- s/p multiple debridements  - wound vac in place  - ID consulted for antibiotic management: He was treated with rocephin, daptomycin, clindamycin. 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 Amphotericin started on 1/7. The patient went to OR on 1/10 for debridement. Surgery note reviewed and appreciated.  1/12- back to OR today.  1/13- OR note reviewed. No purulence noted.    1/27- wound vac in place   2/1- seems to be improving  2/5 wound vac in place  2/9- antibiotics and amphotericin completed  2/12/2022 patient without complaints

## 2022-02-12 NOTE — PROGRESS NOTES
Patient is trached and sedate.  He does open his eyes and seemed to fix and follow.    Physical exam general the patient is chronically ill-appearing, heart is regular rate and rhythm, lungs are clear to auscultation anteriorly, abdomen is soft with positive bowel sounds    Assessment/plan 1. ESRD-continue HD support  2. Diabetes mellitus-continue present hypoglycemic regimen  3. Secondary hyperparathyroidism-continue [place]a  4. Hypertension-continue [place] has some  5. Respiratory failure-continue trach care

## 2022-02-12 NOTE — PLAN OF CARE
Problem: Adult Inpatient Plan of Care  Goal: Plan of Care Review  2/12/2022 1748 by Debbie Meek RN  Outcome: Ongoing, Progressing  Flowsheets (Taken 2/12/2022 1748)  Plan of Care Reviewed With: patient  2/12/2022 1747 by Debbie Meek RN  Outcome: Ongoing, Progressing  Goal: Patient-Specific Goal (Individualized)  2/12/2022 1748 by Debbie Meek RN  Flowsheets (Taken 2/12/2022 1748)  Anxieties, Fears or Concerns: pt will be able to sit up assisted in 2 weeks  Individualized Care Needs: pt will be able to raise BUE on demand  Patient-Specific Goals (Include Timeframe): pt will be able to lift and hold BLE in next two weeks  2/12/2022 1747 by Debbie Meek RN  Outcome: Ongoing, Progressing

## 2022-02-12 NOTE — PROGRESS NOTES
[facility]  Pulmonology  Progress Note    Patient Name: Og Garcia  MRN: [id number]  Admission Date: 12/23/2021  Hospital Length of Stay: 51 days  Code Status: Prior  Attending Provider: Cecil Abernathy DO  Primary Care Provider: Hector Arndt DNP, FNP-C   Principal Problem: Denice gangrene    Subjective:     Interval History:  Patient without complaints    Objective:     Vital Signs (Most Recent):  Temp: 99.5 °F (37.5 °C) (02/12/22 0400)  Pulse: 88 (02/12/22 0406)  Resp: 19 (02/12/22 0406)  BP: (!) 105/56 (02/12/22 0055)  SpO2: 100 % (02/12/22 0406) Vital Signs (24h Range):  Temp:  [99.1 °F (37.3 °C)-100.3 °F (37.9 °C)] 99.5 °F (37.5 °C)  Pulse:  [] 88  Resp:  [11-41] 19  SpO2:  [98 %-100 %] 100 %  BP: ()/(50-78) 105/56     Weight: 86.8 kg (191 lb 5.8 oz)  Body mass index is 26.69 kg/m².      Intake/Output Summary (Last 24 hours) at 2/12/2022 0539  Last data filed at 2/11/2022 1656  Gross per 24 hour   Intake 1020 ml   Output 1500 ml   Net -480 ml       Physical Exam  Vitals reviewed.   Constitutional:       Appearance: Normal appearance.      Interventions: He is not intubated.  HENT:      Head: Normocephalic and atraumatic.      Nose: Nose normal.      Mouth/Throat:      Mouth: Mucous membranes are dry.      Pharynx: Oropharynx is clear.   Eyes:      Extraocular Movements: Extraocular movements intact.      Conjunctiva/sclera: Conjunctivae normal.      Pupils: Pupils are equal, round, and reactive to light.   Cardiovascular:      Rate and Rhythm: Normal rate.      Heart sounds: Normal heart sounds. No murmur heard.      Pulmonary:      Effort: Pulmonary effort is normal. He is not intubated.      Breath sounds: Normal breath sounds.   Abdominal:      General: Abdomen is flat. Bowel sounds are normal.      Palpations: Abdomen is soft.   Musculoskeletal:         General: Normal range of motion.      Cervical back: Normal range of motion and neck supple.      Right  lower leg: No edema.      Left lower leg: No edema.   Skin:     General: Skin is warm and dry.      Capillary Refill: Capillary refill takes less than 2 seconds.   Neurological:      General: No focal deficit present.      Mental Status: He is alert and oriented to person, place, and time.   Psychiatric:         Mood and Affect: Mood normal.         Behavior: Behavior normal.         Vents:  Vent Mode: PSV (02/12/22 0406)  Set Rate: 16 BPM (02/11/22 2356)  Vt Set: 480 mL (02/11/22 2356)  Pressure Support: 12 cmH20 (02/12/22 0406)  PEEP/CPAP: 5 cmH20 (02/12/22 0406)  Oxygen Concentration (%): 40 (02/12/22 0406)  Peak Airway Pressure: 18 cmH2O (02/12/22 0406)  Total Ve: 5.5 mL (02/12/22 0406)  F/VT Ratio<105 (RSBI): (!) 50.94 (02/12/22 0406)    Lines/Drains/Airways     Peripherally Inserted Central Catheter Line            PICC Double Lumen 01/05/22 left basilic 38 days          Central Venous Catheter Line                 Hemodialysis Catheter 12/30/21 0900 right internal jugular 43 days          Drain                 NG/OG Tube [place] sump 18 Fr. Left nostril -- days         Colostomy 12/18/21 1030 Descending/sigmoid LUQ 55 days          Airway                 Surgical Airway 01/04/22 Shiley 39 days                Significant Labs:    CBC/Anemia Profile:  No results for input(s): WBC, HGB, HCT, PLT, MCV, RDW, IRON, FERRITIN, RETIC, FOLATE, [id number], OCCULTBLOOD in the last 48 hours.     Chemistries:  No results for input(s): NA, K, CL, CO2, BUN, CREATININE, CALCIUM, ALBUMIN, PROT, BILITOT, ALKPHOS, ALT, AST, GLUCOSE, MG, PHOS in the last 48 hours.    All pertinent labs within the past 24 hours have been reviewed.    Significant Imaging:  I have reviewed all pertinent imaging results/findings within the past 24 hours.    Assessment/Plan:     * Denice gangrene  - s/p multiple debridements  - wound vac in place  - ID consulted for antibiotic management: He was treated with rocephin, daptomycin, clindamycin. 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 Amphotericin started on 1/7. The patient went to OR on 1/10 for debridement. Surgery note reviewed and appreciated.  1/12- back to OR today.  1/13- OR note reviewed. No purulence noted.    1/27- wound vac in place   2/1- seems to be improving  2/5 wound vac in place  2/9- antibiotics and amphotericin completed  2/12/2022 patient without complaints      On mechanically assisted ventilation  - 12/14 intubated, tracheostomy 1/4  Weaning waxes and wanes     1/27- continue to increase as tolerated. Currently doing 4 hours SIMV with reduced rate of 4. - will transition his fentanyl infusion to a patch of 50mcg today    Continue weaning from the vent  1/31- plan for 5 hours tid today. Continue to increase as tolerated  2/1- He was not able to complete trial last night. We will continue with current plan and try again today  2/2- had issues yesterday due to tachycardia and anxiety. I have added ativan prn  2/3 was not able to do any trials yesterday. I am going to rest on the ventilator today  2/4 plan to resume some trials following HD today  2/5- still having issues with weaning trials. A lot of secretions. I have added mucinex.  2/6 on a trial this am and doing ok. Will continue throughout today as tolerated  2/7- plan to keep same weaning trials today  2/8- weaning has been slow. He is doing ok today with simv with reduce rate of 2.  02/11/2022 continue weaning    RAHAT (acute kidney injury)  - tunneled HD line placement 12/30  Continues with dialysis              Disseminated  mucormycosis  Defer to Dr. Hall on all antibiotic choices---Her note has been reviewed and is appreciated.     Amphotericin until 02/03   zosyn and gent for pseudomonal double coverage until 02/01 2/4 treatment completed                 Jose Urban MD  Pulmonology  [facility]

## 2022-02-12 NOTE — SUBJECTIVE & OBJECTIVE
Interval History:  Patient without complaints    Objective:     Vital Signs (Most Recent):  Temp: 99.5 °F (37.5 °C) (02/12/22 0400)  Pulse: 88 (02/12/22 0406)  Resp: 19 (02/12/22 0406)  BP: (!) 105/56 (02/12/22 0055)  SpO2: 100 % (02/12/22 0406) Vital Signs (24h Range):  Temp:  [99.1 °F (37.3 °C)-100.3 °F (37.9 °C)] 99.5 °F (37.5 °C)  Pulse:  [] 88  Resp:  [11-41] 19  SpO2:  [98 %-100 %] 100 %  BP: ()/(50-78) 105/56     Weight: 86.8 kg (191 lb 5.8 oz)  Body mass index is 26.69 kg/m².      Intake/Output Summary (Last 24 hours) at 2/12/2022 0539  Last data filed at 2/11/2022 1656  Gross per 24 hour   Intake 1020 ml   Output 1500 ml   Net -480 ml       Physical Exam  Vitals reviewed.   Constitutional:       Appearance: Normal appearance.      Interventions: He is not intubated.  HENT:      Head: Normocephalic and atraumatic.      Nose: Nose normal.      Mouth/Throat:      Mouth: Mucous membranes are dry.      Pharynx: Oropharynx is clear.   Eyes:      Extraocular Movements: Extraocular movements intact.      Conjunctiva/sclera: Conjunctivae normal.      Pupils: Pupils are equal, round, and reactive to light.   Cardiovascular:      Rate and Rhythm: Normal rate.      Heart sounds: Normal heart sounds. No murmur heard.      Pulmonary:      Effort: Pulmonary effort is normal. He is not intubated.      Breath sounds: Normal breath sounds.   Abdominal:      General: Abdomen is flat. Bowel sounds are normal.      Palpations: Abdomen is soft.   Musculoskeletal:         General: Normal range of motion.      Cervical back: Normal range of motion and neck supple.      Right lower leg: No edema.      Left lower leg: No edema.   Skin:     General: Skin is warm and dry.      Capillary Refill: Capillary refill takes less than 2 seconds.   Neurological:      General: No focal deficit present.      Mental Status: He is alert and oriented to person, place, and time.   Psychiatric:         Mood and Affect: Mood normal.          Behavior: Behavior normal.         Vents:  Vent Mode: PSV (02/12/22 0406)  Set Rate: 16 BPM (02/11/22 2356)  Vt Set: 480 mL (02/11/22 2356)  Pressure Support: 12 cmH20 (02/12/22 0406)  PEEP/CPAP: 5 cmH20 (02/12/22 0406)  Oxygen Concentration (%): 40 (02/12/22 0406)  Peak Airway Pressure: 18 cmH2O (02/12/22 0406)  Total Ve: 5.5 mL (02/12/22 0406)  F/VT Ratio<105 (RSBI): (!) 50.94 (02/12/22 0406)    Lines/Drains/Airways     Peripherally Inserted Central Catheter Line            PICC Double Lumen 01/05/22 left basilic 38 days          Central Venous Catheter Line                 Hemodialysis Catheter 12/30/21 0900 right internal jugular 43 days          Drain                 NG/OG Tube [place] sump 18 Fr. Left nostril -- days         Colostomy 12/18/21 1030 Descending/sigmoid LUQ 55 days          Airway                 Surgical Airway 01/04/22 [place] 39 days                Significant Labs:    CBC/Anemia Profile:  No results for input(s): WBC, HGB, HCT, PLT, MCV, RDW, IRON, FERRITIN, RETIC, FOLATE, [id number], OCCULTBLOOD in the last 48 hours.     Chemistries:  No results for input(s): NA, K, CL, CO2, BUN, CREATININE, CALCIUM, ALBUMIN, PROT, BILITOT, ALKPHOS, ALT, AST, GLUCOSE, MG, PHOS in the last 48 hours.    All pertinent labs within the past 24 hours have been reviewed.    Significant Imaging:  I have reviewed all pertinent imaging results/findings within the past 24 hours.

## 2022-02-13 NOTE — PROGRESS NOTES
Patient is trached and sedate.  He does open his eyes and seemed to fix and follow.    Physical exam general the patient is chronically ill-appearing, heart is regular rate and rhythm, lungs are clear to auscultation anteriorly, abdomen is soft with positive bowel sounds    Assessment/plan 1. ESRD-continue HD support  2. Diabetes mellitus-continue present hypoglycemic regimen, patient's glucose was around 160 at lunch  3. Secondary hyperparathyroidism-continue Renvela  4. Hypertension-continue present antihypertensives, patient's blood pressure is 113/68 today  5. Respiratory failure-continue trach care

## 2022-02-13 NOTE — PROGRESS NOTES
[facility]  Pulmonology  Progress Note    Patient Name: Og Garcia  MRN: [id number]  Admission Date: 12/23/2021  Hospital Length of Stay: 52 days  Code Status: Prior  Attending Provider: Cecil Abernathy DO  Primary Care Provider: Hector Arndt DNP, FNP-C   Principal Problem: Denice gangrene    Subjective:     Interval History:  Seems alert but does not follow any  commands    Objective:     Vital Signs (Most Recent):  Temp: 98.2 °F (36.8 °C) (02/13/22 0400)  Pulse: 99 (02/13/22 0530)  Resp: (!) 29 (02/13/22 0530)  BP: (!) 104/57 (02/13/22 0530)  SpO2: 100 % (02/13/22 0530) Vital Signs (24h Range):  Temp:  [98.1 °F (36.7 °C)-99.6 °F (37.6 °C)] 98.2 °F (36.8 °C)  Pulse:  [] 99  Resp:  [11-35] 29  SpO2:  [99 %-100 %] 100 %  BP: ()/(52-90) 104/57     Weight: 86.8 kg (191 lb 5.8 oz)  Body mass index is 26.69 kg/m².      Intake/Output Summary (Last 24 hours) at 2/13/2022 0546  Last data filed at 2/12/2022 1800  Gross per 24 hour   Intake --   Output 50 ml   Net -50 ml       Physical Exam  Vitals reviewed.   Constitutional:       Appearance: Normal appearance.      Interventions: He is sedated and intubated.   HENT:      Head: Normocephalic and atraumatic.      Nose: Nose normal.      Mouth/Throat:      Mouth: Mucous membranes are dry.      Pharynx: Oropharynx is clear.   Eyes:      Extraocular Movements: Extraocular movements intact.      Conjunctiva/sclera: Conjunctivae normal.      Pupils: Pupils are equal, round, and reactive to light.   Cardiovascular:      Rate and Rhythm: Normal rate.      Heart sounds: Normal heart sounds. No murmur heard.      Pulmonary:      Effort: Pulmonary effort is normal. He is intubated.      Breath sounds: Normal breath sounds.   Abdominal:      General: Abdomen is flat. Bowel sounds are normal.      Palpations: Abdomen is soft.   Musculoskeletal:         General: Normal range of motion.      Cervical back: Normal range of motion and neck  supple.      Right lower leg: No edema.      Left lower leg: No edema.   Skin:     General: Skin is warm and dry.      Capillary Refill: Capillary refill takes less than 2 seconds.   Neurological:      General: No focal deficit present.      Mental Status: He is alert and oriented to person, place, and time.   Psychiatric:         Mood and Affect: Mood normal.         Behavior: Behavior normal.         Vents:  Vent Mode: SIMV (VC) + PS (02/12/22 2347)  Set Rate: 2 BPM (02/12/22 2347)  Vt Set: 480 mL (02/12/22 2347)  Pressure Support: 12 cmH20 (02/12/22 2347)  PEEP/CPAP: 5 cmH20 (02/12/22 2347)  Oxygen Concentration (%): 40 (02/12/22 2347)  Peak Airway Pressure: 16 cmH2O (02/12/22 2347)  Total Ve: 7.1 mL (02/12/22 2347)  F/VT Ratio<105 (RSBI): (!) 79.63 (02/12/22 2347)    Lines/Drains/Airways     Peripherally Inserted Central Catheter Line            PICC Double Lumen 01/05/22 left basilic 39 days          Central Venous Catheter Line                 Hemodialysis Catheter 12/30/21 0900 right internal jugular 44 days          Drain                 NG/OG Tube [place] sump 18 Fr. Left nostril -- days         Colostomy 12/18/21 1030 Descending/sigmoid LUQ 56 days          Airway                 Surgical Airway 01/04/22 [place] 40 days                Significant Labs:    CBC/Anemia Profile:  No results for input(s): WBC, HGB, HCT, PLT, MCV, RDW, IRON, FERRITIN, RETIC, FOLATE, [id number], OCCULTBLOOD in the last 48 hours.     Chemistries:  No results for input(s): NA, K, CL, CO2, BUN, CREATININE, CALCIUM, ALBUMIN, PROT, BILITOT, ALKPHOS, ALT, AST, GLUCOSE, MG, PHOS in the last 48 hours.    All pertinent labs within the past 24 hours have been reviewed.    Significant Imaging:  I have reviewed all pertinent imaging results/findings within the past 24 hours.    Assessment/Plan:     * Denice gangrene  - s/p multiple debridements  - wound vac in place  - ID consulted for antibiotic management: He was treated with rocephin,  daptomycin, clindamycin. 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 Amphotericin started on 1/7. The patient went to OR on 1/10 for debridement. Surgery note reviewed and appreciated.  1/12- back to OR today.  1/13- OR note reviewed. No purulence noted.    1/27- wound vac in place   2/1- seems to be improving  2/5 wound vac in place  2/9- antibiotics and amphotericin completed  2/12/2022 patient without complaints      On mechanically assisted ventilation  - 12/14 intubated, tracheostomy 1/4  Weaning waxes and wanes     1/27- continue to increase as tolerated. Currently doing 4 hours SIMV with reduced rate of 4. - will transition his fentanyl infusion to a patch of 50mcg today    Continue weaning from the vent  1/31- plan for 5 hours tid today. Continue to increase as tolerated  2/1- He was not able to complete trial last night. We will continue with current plan and try again today  2/2- had issues yesterday due to tachycardia and anxiety. I have added ativan prn  2/3 was not able to do any trials yesterday. I am going to rest on the ventilator today  2/4 plan to resume some trials following HD today  2/5- still having issues with weaning trials. A lot of secretions. I have added mucinex.  2/6 on a trial this am and doing ok. Will continue throughout today as tolerated  2/7- plan to keep same weaning trials today  2/8- weaning has been slow. He is doing ok today with simv with reduce rate of 2.  02/11/2022 continue weaning    RAHAT (acute kidney injury)  - tunneled HD line placement 12/30  Continues with  dialysis                           Jose Urban MD  Pulmonology  [facility]

## 2022-02-13 NOTE — SUBJECTIVE & OBJECTIVE
Interval History:  Seems alert but does not follow any  commands    Objective:     Vital Signs (Most Recent):  Temp: 98.2 °F (36.8 °C) (02/13/22 0400)  Pulse: 99 (02/13/22 0530)  Resp: (!) 29 (02/13/22 0530)  BP: (!) 104/57 (02/13/22 0530)  SpO2: 100 % (02/13/22 0530) Vital Signs (24h Range):  Temp:  [98.1 °F (36.7 °C)-99.6 °F (37.6 °C)] 98.2 °F (36.8 °C)  Pulse:  [] 99  Resp:  [11-35] 29  SpO2:  [99 %-100 %] 100 %  BP: ()/(52-90) 104/57     Weight: 86.8 kg (191 lb 5.8 oz)  Body mass index is 26.69 kg/m².      Intake/Output Summary (Last 24 hours) at 2/13/2022 0546  Last data filed at 2/12/2022 1800  Gross per 24 hour   Intake --   Output 50 ml   Net -50 ml       Physical Exam  Vitals reviewed.   Constitutional:       Appearance: Normal appearance.      Interventions: He is sedated and intubated.   HENT:      Head: Normocephalic and atraumatic.      Nose: Nose normal.      Mouth/Throat:      Mouth: Mucous membranes are dry.      Pharynx: Oropharynx is clear.   Eyes:      Extraocular Movements: Extraocular movements intact.      Conjunctiva/sclera: Conjunctivae normal.      Pupils: Pupils are equal, round, and reactive to light.   Cardiovascular:      Rate and Rhythm: Normal rate.      Heart sounds: Normal heart sounds. No murmur heard.      Pulmonary:      Effort: Pulmonary effort is normal. He is intubated.      Breath sounds: Normal breath sounds.   Abdominal:      General: Abdomen is flat. Bowel sounds are normal.      Palpations: Abdomen is soft.   Musculoskeletal:         General: Normal range of motion.      Cervical back: Normal range of motion and neck supple.      Right lower leg: No edema.      Left lower leg: No edema.   Skin:     General: Skin is warm and dry.      Capillary Refill: Capillary refill takes less than 2 seconds.   Neurological:      General: No focal deficit present.      Mental Status: He is alert and oriented to person, place, and time.   Psychiatric:         Mood and Affect:  Mood normal.         Behavior: Behavior normal.         Vents:  Vent Mode: SIMV (VC) + PS (02/12/22 2347)  Set Rate: 2 BPM (02/12/22 2347)  Vt Set: 480 mL (02/12/22 2347)  Pressure Support: 12 cmH20 (02/12/22 2347)  PEEP/CPAP: 5 cmH20 (02/12/22 2347)  Oxygen Concentration (%): 40 (02/12/22 2347)  Peak Airway Pressure: 16 cmH2O (02/12/22 2347)  Total Ve: 7.1 mL (02/12/22 2347)  F/VT Ratio<105 (RSBI): (!) 79.63 (02/12/22 2347)    Lines/Drains/Airways     Peripherally Inserted Central Catheter Line            PICC Double Lumen 01/05/22 left basilic 39 days          Central Venous Catheter Line                 Hemodialysis Catheter 12/30/21 0900 right internal jugular 44 days          Drain                 NG/OG Tube [place] sump 18 Fr. Left nostril -- days         Colostomy 12/18/21 1030 Descending/sigmoid LUQ 56 days          Airway                 Surgical Airway 01/04/22 [place] 40 days                Significant Labs:    CBC/Anemia Profile:  No results for input(s): WBC, HGB, HCT, PLT, MCV, RDW, IRON, FERRITIN, RETIC, FOLATE, [id number], OCCULTBLOOD in the last 48 hours.     Chemistries:  No results for input(s): NA, K, CL, CO2, BUN, CREATININE, CALCIUM, ALBUMIN, PROT, BILITOT, ALKPHOS, ALT, AST, GLUCOSE, MG, PHOS in the last 48 hours.    All pertinent labs within the past 24 hours have been reviewed.    Significant Imaging:  I have reviewed all pertinent imaging results/findings within the past 24 hours.

## 2022-02-14 NOTE — ASSESSMENT & PLAN NOTE
- H/H down to 5.5/16 - pt had bleeding from his HD insertion site yesterday this has now resolved.   - will transfuse 2 units PRBCs on HD  1/2- 9.7/27.5 - repeat CBC in AM  1/4- Hgb is 7.2. Not sure if he will be transfused during surgery. IF not we will try to get  Him transfused with next HD  01/06/2021 transfuse 1 unit of packed red blood cells today  01/10/2021 needs a unit of blood  1/11- repeat H&H in am. Continue to transfuse as needed.  1/13- Hgb is 5. Transfusing today. Post transfusion H&H  1/14- CBC pending for today  1/15- 6/18 - hemodynamically stable, recheck in AM   1/16-- 6.1/17.1 - critical low blood shortage in hospital - holding transfusion until next HD since he is hemodynamically stable and not actively bleeding   01/17/2021 day transfuse blood on dialysis today  01/18/2020 to hemoglobin up to 9 after transfusion  01/25/2022 no signs of bleeding most recent hemoglobin 8  01/29/2022 patient will need transfusion today  1/31- Hgb 6.7 yesterday. Can transfuse with next HD session  2/2- H&H is pending  2/3- plan to transfuse 1 unit of PRBC's  2/4 Hgb is 6.9 today. We will transfuse another unit today  2/7 8.4 now  2/14- 7.9 today

## 2022-02-14 NOTE — ASSESSMENT & PLAN NOTE
- 12/14 intubated, tracheostomy 1/4  Weaning waxes and wanes     1/27- continue to increase as tolerated. Currently doing 4 hours SIMV with reduced rate of 4. - will transition his fentanyl infusion to a patch of 50mcg today    Continue weaning from the vent  1/31- plan for 5 hours tid today. Continue to increase as tolerated  2/1- He was not able to complete trial last night. We will continue with current plan and try again today  2/2- had issues yesterday due to tachycardia and anxiety. I have added ativan prn  2/3 was not able to do any trials yesterday. I am going to rest on the ventilator today  2/4 plan to resume some trials following HD today  2/5- still having issues with weaning trials. A lot of secretions. I have added mucinex.  2/6 on a trial this am and doing ok. Will continue throughout today as tolerated  2/7- plan to keep same weaning trials today  2/8- weaning has been slow. He is doing ok today with simv with reduce rate of 2.  02/11/2022 continue weaning  2/14- plan to increase weaning today.

## 2022-02-14 NOTE — ASSESSMENT & PLAN NOTE
Dialyzed yesterday  1/31/2022 Continue with scheduled hemodialysis.  2/1/2022 Continue with dialysis for this patient.  2/2/2022 Dialysis sessions are going well.  2/3/2022 Dialysis as scheduled for this patient.  2/4/2022 The patient is doing well with dialysis.  Metabolics are stable.  2/8/2022 Continue with dialysis for this patient.  2/14/2022 Continue with scheduled hemodialysis for this patient.

## 2022-02-14 NOTE — ASSESSMENT & PLAN NOTE
Currently hypotensive. Holding any antihypertensives  BP looks a little better this am. Weaning pressor as tolerated  1/21- No longer on pressor.   1/31- hemodynamically stable  Blood pressure looks good

## 2022-02-14 NOTE — SUBJECTIVE & OBJECTIVE
Interval History: The patient is resting.  No acute changes.  He tolerated dialysis today.    Review of patient's allergies indicates:  No Known Allergies  Current Facility-Administered Medications   Medication Frequency    0.9%  NaCl infusion PRN    acetaminophen tablet 500 mg Q6H PRN    albuterol nebulizer solution 2.5 mg Q4H PRN    bisacodyL EC tablet 10 mg Daily PRN    calcium gluconate 1 g in dextrose 5 % in water (D5W) 5 % 100 mL IVPB (MB+) Once    dextromethorphan-guaiFENesin  mg/5 ml liquid 10 mL Q6H PRN    dextrose 50% injection 12.5 g PRN    diltiaZEM tablet 60 mg Q6H    fentaNYL 50 mcg/hr 1 patch Q72H    glucagon (human recombinant) injection 1 mg PRN    heparin (porcine) injection 4,000 Units PRN    heparin (porcine) injection 5,000 Units Q8H    insulin aspart U-100 injection 1-10 Units Q6H    lorazepam injection 2 mg Q6H PRN    ondansetron injection 8 mg Q6H PRN    pantoprazole suspension 40 mg Daily    sevelamer carbonate pwpk 0.8 g TID WM    silver sulfADIAZINE 1% cream PRN    simethicone chewable tablet 80 mg TID PRN    ticagrelor tablet 90 mg BID       Objective:     Vital Signs (Most Recent):  Temp: 97.8 °F (36.6 °C) (02/14/22 0930)  Pulse: 101 (02/14/22 1545)  Resp: (!) 26 (02/14/22 1545)  BP: 113/63 (02/14/22 1545)  SpO2: (!) 91 % (02/14/22 1545)  O2 Device (Oxygen Therapy): ventilator (02/14/22 1445) Vital Signs (24h Range):  Temp:  [97.8 °F (36.6 °C)-100 °F (37.8 °C)] 97.8 °F (36.6 °C)  Pulse:  [] 101  Resp:  [16-48] 26  SpO2:  [91 %-100 %] 91 %  BP: (101-149)/(50-83) 113/63     Weight: 86.8 kg (191 lb 5.8 oz) (02/11/22 0600)  Body mass index is 26.69 kg/m².  Body surface area is 2.09 meters squared.    No intake/output data recorded.    Physical Exam  Vitals reviewed.   HENT:      Head: Atraumatic.   Cardiovascular:      Rate and Rhythm: Regular rhythm.   Pulmonary:      Breath sounds: Normal breath sounds.   Abdominal:      Palpations: Abdomen is soft.    Neurological:      Mental Status: He is alert.         Significant Labs:  BMP:   Recent Labs   Lab 02/14/22  0705   *      K 3.9      CO2 27   BUN 56*   CREATININE 1.55*   CALCIUM 8.0*   MG 2.2        Significant Imaging:  Labs: Reviewed

## 2022-02-14 NOTE — PROGRESS NOTES
The patient is resting.  No acute changes.  No fevers or chills.  He tolerated dialysis today with about a liter of fluid removed.  He remains ventilated.     CV: RR  LUNGS: Clear anterior, ventilated  AB: soft, positive bowel sounds.  Abdominal wound  EXT: no edema.    Labs noted    1.  ESRD  2.  Respiratory failure  3.  Anemia  4.  Debility  5.  Sacral wound  6.  Continue with scheduled hemodialysis

## 2022-02-14 NOTE — PLAN OF CARE
Problem: Adult Inpatient Plan of Care  Goal: Plan of Care Review  Outcome: Ongoing, Progressing  Goal: Patient-Specific Goal (Individualized)  Outcome: Ongoing, Progressing  Goal: Optimal Comfort and Wellbeing  Outcome: Ongoing, Progressing     Problem: Glycemic Control Impaired (Sepsis/Septic Shock)  Goal: Blood Glucose Level Within Desired Range  Outcome: Ongoing, Progressing     Problem: Infection Progression (Sepsis/Septic Shock)  Goal: Absence of Infection Signs and Symptoms  Outcome: Ongoing, Progressing

## 2022-02-14 NOTE — PROGRESS NOTES
[facility]  Nephrology  Progress Note    Patient Name: Og Garcia  MRN: [id number]  Admission Date: 12/23/2021  Hospital Length of Stay: 53 days  Attending Provider: Cecil Abernathy DO   Primary Care Physician: Hector Arndt DNP, FNP-C  Principal Problem:Denice gangrene    Subjective:     HPI: The patient is known from the previous nephrology consult at [facility].  He is no at Specialty and continues to need dialysis support.      Interval History: The patient is resting.  No acute changes.  He tolerated dialysis today.    Review of patient's allergies indicates:  No Known Allergies  Current Facility-Administered Medications   Medication Frequency    0.9%  NaCl infusion PRN    acetaminophen tablet 500 mg Q6H PRN    albuterol nebulizer solution 2.5 mg Q4H PRN    bisacodyL EC tablet 10 mg Daily PRN    calcium gluconate 1 g in dextrose 5 % in water (D5W) 5 % 100 mL IVPB (MB+) Once    dextromethorphan-guaiFENesin  mg/5 ml liquid 10 mL Q6H PRN    dextrose 50% injection 12.5 g PRN    diltiaZEM tablet 60 mg Q6H    fentaNYL 50 mcg/hr 1 patch Q72H    glucagon (human recombinant) injection 1 mg PRN    heparin (porcine) injection 4,000 Units PRN    heparin (porcine) injection 5,000 Units Q8H    insulin aspart U-100 injection 1-10 Units Q6H    lorazepam injection 2 mg Q6H PRN    ondansetron injection 8 mg Q6H PRN    pantoprazole suspension 40 mg Daily    sevelamer carbonate pwpk 0.8 g TID WM    silver sulfADIAZINE 1% cream PRN    simethicone chewable tablet 80 mg TID PRN    ticagrelor tablet 90 mg BID       Objective:     Vital Signs (Most Recent):  Temp: 97.8 °F (36.6 °C) (02/14/22 0930)  Pulse: 101 (02/14/22 1545)  Resp: (!) 26 (02/14/22 1545)  BP: 113/63 (02/14/22 1545)  SpO2: (!) 91 % (02/14/22 1545)  O2 Device (Oxygen Therapy): ventilator (02/14/22 1445) Vital Signs (24h Range):  Temp:  [97.8 °F (36.6 °C)-100 °F (37.8 °C)] 97.8 °F (36.6 °C)  Pulse:  []  101  Resp:  [16-48] 26  SpO2:  [91 %-100 %] 91 %  BP: (101-149)/(50-83) 113/63     Weight: 86.8 kg (191 lb 5.8 oz) (02/11/22 0600)  Body mass index is 26.69 kg/m².  Body surface area is 2.09 meters squared.    No intake/output data recorded.    Physical Exam  Vitals reviewed.   HENT:      Head: Atraumatic.   Cardiovascular:      Rate and Rhythm: Regular rhythm.   Pulmonary:      Breath sounds: Normal breath sounds.   Abdominal:      Palpations: Abdomen is soft.   Neurological:      Mental Status: He is alert.         Significant Labs:  BMP:   Recent Labs   Lab 02/14/22  0705   *      K 3.9      CO2 27   BUN 56*   CREATININE 1.55*   CALCIUM 8.0*   MG 2.2        Significant Imaging:  Labs: Reviewed    Assessment/Plan:     RAHAT (acute kidney injury)  Dialyzed yesterday  1/31/2022 Continue with scheduled hemodialysis.  2/1/2022 Continue with dialysis for this patient.  2/2/2022 Dialysis sessions are going well.  2/3/2022 Dialysis as scheduled for this patient.  2/4/2022 The patient is doing well with dialysis.  Metabolics are stable.  2/8/2022 Continue with dialysis for this patient.  2/14/2022 Continue with scheduled hemodialysis for this patient.        Thank you for your consult. I will follow-up with patient. Please contact us if you have any additional questions.    Edwin Pryor Jr, MD  Nephrology  [facility]

## 2022-02-14 NOTE — SUBJECTIVE & OBJECTIVE
Interval History: No acute events overnight. Currently hemodynamically stable. Oxygenating adequately on the ventilator. Currently afebrile.     Objective:     Vital Signs (Most Recent):  Temp: 100 °F (37.8 °C) (02/14/22 0800)  Pulse: 110 (02/14/22 0630)  Resp: (!) 32 (02/14/22 0630)  BP: 114/65 (02/14/22 0600)  SpO2: 95 % (02/14/22 0630) Vital Signs (24h Range):  Temp:  [99.3 °F (37.4 °C)-100 °F (37.8 °C)] 100 °F (37.8 °C)  Pulse:  [] 110  Resp:  [16-48] 32  SpO2:  [95 %-100 %] 95 %  BP: (110-149)/(55-81) 114/65     Weight: 86.8 kg (191 lb 5.8 oz)  Body mass index is 26.69 kg/m².    No intake or output data in the 24 hours ending 02/14/22 1055    Physical Exam  Vitals reviewed.   Constitutional:       General: He is not in acute distress.     Appearance: He is not ill-appearing.      Comments: Chronically ill appearing   HENT:      Head: Normocephalic and atraumatic.      Right Ear: External ear normal.      Left Ear: External ear normal.      Nose: Nose normal.      Mouth/Throat:      Mouth: Mucous membranes are moist.      Comments: trach  Eyes:      Conjunctiva/sclera: Conjunctivae normal.      Pupils: Pupils are equal, round, and reactive to light.   Cardiovascular:      Rate and Rhythm: Regular rhythm. Tachycardia present.      Pulses: Normal pulses.      Heart sounds: Normal heart sounds.   Pulmonary:      Effort: No respiratory distress.      Comments: clear breath sounds anteriorly  Abdominal:      Palpations: Abdomen is soft.   Musculoskeletal:         General: Normal range of motion.      Cervical back: Neck supple.   Lymphadenopathy:      Cervical: No cervical adenopathy.   Skin:     General: Skin is warm and dry.      Coloration: Skin is not pale.   Neurological:      Mental Status: Mental status is at baseline.      Comments: Remains on mechanical ventilation. Will track at times   Psychiatric:         Behavior: Behavior normal.         Vents:  Vent Mode: A/C (02/14/22 0730)  Set Rate: 16 BPM  (02/14/22 0730)  Vt Set: 480 mL (02/14/22 0730)  Pressure Support: 12 cmH20 (02/13/22 1635)  PEEP/CPAP: 5 cmH20 (02/14/22 0730)  Oxygen Concentration (%): 35 (02/14/22 0730)  Peak Airway Pressure: 21 cmH2O (02/14/22 0730)  Total Ve: 4.6 mL (02/14/22 0730)  F/VT Ratio<105 (RSBI): (!) 81.42 (02/14/22 0315)    Lines/Drains/Airways     Peripherally Inserted Central Catheter Line            PICC Double Lumen 01/05/22 left basilic 40 days          Central Venous Catheter Line                 Hemodialysis Catheter 12/30/21 0900 right internal jugular 46 days          Drain                 NG/OG Tube [place] sump 18 Fr. Left nostril -- days         Colostomy 12/18/21 1030 Descending/sigmoid LUQ 58 days          Airway                 Surgical Airway 01/04/22 Shiley 41 days                Significant Labs:    CBC/Anemia Profile:  Recent Labs   Lab 02/14/22  0704   WBC 14.58*   HGB 7.9*   HCT 23.7*   *   MCV 90.5   RDW 14.8*        Chemistries:  Recent Labs   Lab 02/14/22  0705      K 3.9      CO2 27   BUN 56*   CREATININE 1.55*   CALCIUM 8.0*   ALBUMIN 1.3*   PROT 7.4   BILITOT 0.4   ALKPHOS 631*   ALT 75*   *   MG 2.2       All pertinent labs within the past 24 hours have been reviewed.    Significant Imaging:  I have reviewed all pertinent imaging results/findings within the past 24 hours.

## 2022-02-14 NOTE — PROGRESS NOTES
[facility]  Pulmonology  Progress Note    Patient Name: Og Garcia  MRN: [id number]  Admission Date: 12/23/2021  Hospital Length of Stay: 53 days  Code Status: Prior  Attending Provider: Cecil Abernathy DO  Primary Care Provider: Hector Arndt DNP, FNP-C   Principal Problem: Denice gangrene    Subjective:     Interval History: No acute events overnight. Currently hemodynamically stable. Oxygenating adequately on the ventilator. Currently afebrile.     Objective:     Vital Signs (Most Recent):  Temp: 100 °F (37.8 °C) (02/14/22 0800)  Pulse: 110 (02/14/22 0630)  Resp: (!) 32 (02/14/22 0630)  BP: 114/65 (02/14/22 0600)  SpO2: 95 % (02/14/22 0630) Vital Signs (24h Range):  Temp:  [99.3 °F (37.4 °C)-100 °F (37.8 °C)] 100 °F (37.8 °C)  Pulse:  [] 110  Resp:  [16-48] 32  SpO2:  [95 %-100 %] 95 %  BP: (110-149)/(55-81) 114/65     Weight: 86.8 kg (191 lb 5.8 oz)  Body mass index is 26.69 kg/m².    No intake or output data in the 24 hours ending 02/14/22 1055    Physical Exam  Vitals reviewed.   Constitutional:       General: He is not in acute distress.     Appearance: He is not ill-appearing.      Comments: Chronically ill appearing   HENT:      Head: Normocephalic and atraumatic.      Right Ear: External ear normal.      Left Ear: External ear normal.      Nose: Nose normal.      Mouth/Throat:      Mouth: Mucous membranes are moist.      Comments: trach  Eyes:      Conjunctiva/sclera: Conjunctivae normal.      Pupils: Pupils are equal, round, and reactive to light.   Cardiovascular:      Rate and Rhythm: Regular rhythm. Tachycardia present.      Pulses: Normal pulses.      Heart sounds: Normal heart sounds.   Pulmonary:      Effort: No respiratory distress.      Comments: clear breath sounds anteriorly  Abdominal:      Palpations: Abdomen is soft.   Musculoskeletal:         General: Normal range of motion.      Cervical back: Neck supple.   Lymphadenopathy:      Cervical: No  cervical adenopathy.   Skin:     General: Skin is warm and dry.      Coloration: Skin is not pale.   Neurological:      Mental Status: Mental status is at baseline.      Comments: Remains on mechanical ventilation. Will track at times   Psychiatric:         Behavior: Behavior normal.         Vents:  Vent Mode: A/C (02/14/22 0730)  Set Rate: 16 BPM (02/14/22 0730)  Vt Set: 480 mL (02/14/22 0730)  Pressure Support: 12 cmH20 (02/13/22 1635)  PEEP/CPAP: 5 cmH20 (02/14/22 0730)  Oxygen Concentration (%): 35 (02/14/22 0730)  Peak Airway Pressure: 21 cmH2O (02/14/22 0730)  Total Ve: 4.6 mL (02/14/22 0730)  F/VT Ratio<105 (RSBI): (!) 81.42 (02/14/22 0315)    Lines/Drains/Airways     Peripherally Inserted Central Catheter Line            PICC Double Lumen 01/05/22 left basilic 40 days          Central Venous Catheter Line                 Hemodialysis Catheter 12/30/21 0900 right internal jugular 46 days          Drain                 NG/OG Tube [place] sump 18 Fr. Left nostril -- days         Colostomy 12/18/21 1030 Descending/sigmoid LUQ 58 days          Airway                 Surgical Airway 01/04/22 Shiley 41 days                Significant Labs:    CBC/Anemia Profile:  Recent Labs   Lab 02/14/22  0704   WBC 14.58*   HGB 7.9*   HCT 23.7*   *   MCV 90.5   RDW 14.8*        Chemistries:  Recent Labs   Lab 02/14/22  0705      K 3.9      CO2 27   BUN 56*   CREATININE 1.55*   CALCIUM 8.0*   ALBUMIN 1.3*   PROT 7.4   BILITOT 0.4   ALKPHOS 631*   ALT 75*   *   MG 2.2       All pertinent labs within the past 24 hours have been reviewed.    Significant Imaging:  I have reviewed all pertinent imaging results/findings within the past 24 hours.    Assessment/Plan:     * Denice gangrene  - s/p multiple debridements  - wound vac in place  - ID consulted for antibiotic management: He was treated with rocephin, daptomycin, clindamycin. 12/21- change clindamycin to ampicillin and treat for another week  - remains  on ampicillin, this was changed to merrem 1/9  - pathology with fungal species consistent with mucormycosis initiated on amphotericin- mucormycosis with up to 50% mortality rate  - plan for OR tomorrow  Patient receiving amphotericin having spells of bradycardia will repeat blood cultures and do a echo to look for endocarditis  1/11- echo reviewed and no obvious vegetations. Amphotericin started on 1/7. The patient went to OR on 1/10 for debridement. Surgery note reviewed and appreciated.  1/12- back to OR today.  1/13- OR note reviewed. No purulence noted.    1/27- wound vac in place   2/1- seems to be improving  2/5 wound vac in place  2/9- antibiotics and amphotericin completed  2/14- no acute issues currently      Disseminated mucormycosis  Defer to Dr. Hall on all antibiotic choices---Her note has been reviewed and is appreciated.     Amphotericin until 02/03   zosyn and gent for pseudomonal double coverage until 02/01 2/4 treatment completed    Type 2 diabetes mellitus without complication, without long-term current use of insulin  - continue basal/bolus insulin  -accuchecks in last 24 hours 123-160      Acute blood loss anemia  - H/H down to 5.5/16 - pt had bleeding from his HD insertion site yesterday this has now resolved.   - will transfuse 2 units PRBCs on HD  1/2- 9.7/27.5 - repeat CBC in AM  1/4- Hgb is 7.2. Not sure if he will be transfused during surgery. IF not we will try to get  Him transfused with next HD  01/06/2021 transfuse 1 unit of packed red blood cells today  01/10/2021 needs a unit of blood  1/11- repeat H&H in am. Continue to transfuse as needed.  1/13- Hgb is 5. Transfusing today. Post transfusion H&H  1/14- CBC pending for today  1/15- 6/18 - hemodynamically stable, recheck in AM   1/16-- 6.1/17.1 - critical low blood shortage in hospital - holding transfusion until next HD since he is hemodynamically stable and not actively bleeding   01/17/2021 day transfuse blood on dialysis  today  01/18/2020 to hemoglobin up to 9 after transfusion  01/25/2022 no signs of bleeding most recent hemoglobin 8  01/29/2022 patient will need transfusion today  1/31- Hgb 6.7 yesterday. Can transfuse with next HD session  2/2- H&H is pending  2/3- plan to transfuse 1 unit of PRBC's  2/4 Hgb is 6.9 today. We will transfuse another unit today  2/7 8.4 now  2/14- 7.9 today    RAHAT (acute kidney injury)  - tunneled HD line placement 12/30  Continues with dialysis per nephrology              On mechanically assisted ventilation  - 12/14 intubated, tracheostomy 1/4  Weaning waxes and wanes     1/27- continue to increase as tolerated. Currently doing 4 hours SIMV with reduced rate of 4. - will transition his fentanyl infusion to a patch of 50mcg today    Continue weaning from the vent  1/31- plan for 5 hours tid today. Continue to increase as tolerated  2/1- He was not able to complete trial last night. We will continue with current plan and try again today  2/2- had issues yesterday due to tachycardia and anxiety. I have added ativan prn  2/3 was not able to do any trials yesterday. I am going to rest on the ventilator today  2/4 plan to resume some trials following HD today  2/5- still having issues with weaning trials. A lot of secretions. I have added mucinex.  2/6 on a trial this am and doing ok. Will continue throughout today as tolerated  2/7- plan to keep same weaning trials today  2/8- weaning has been slow. He is doing ok today with simv with reduce rate of 2.  02/11/2022 continue weaning  2/14- plan to increase weaning today.     Hypertension  Currently hypotensive. Holding any antihypertensives  BP looks a little better this am. Weaning pressor as tolerated  1/21- No longer on pressor.   1/31- hemodynamically stable  Blood pressure looks good                 Cecil Abernathy, DO  Pulmonology  [facility]

## 2022-02-14 NOTE — ASSESSMENT & PLAN NOTE
- s/p multiple debridements  - wound vac in place  - ID consulted for antibiotic management: He was treated with rocephin, daptomycin, clindamycin. 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 Amphotericin started on 1/7. The patient went to OR on 1/10 for debridement. Surgery note reviewed and appreciated.  1/12- back to OR today.  1/13- OR note reviewed. No purulence noted.    1/27- wound vac in place   2/1- seems to be improving  2/5 wound vac in place  2/9- antibiotics and amphotericin completed  2/14- no acute issues currently

## 2022-02-15 NOTE — PROGRESS NOTES
Placed patient on SIMV rate 2, PS 15, O2 35%+5. Patient tolerating well at this time. HR: 98, RR: 17, O2: 100%, BP: WNL. If no complications patient will remain on this mode for 6hrs and will be returned back to prior settings at 2300 by next shift. Nurse notified. Will continue to monitor and report to oncoming therapist.     18:10-At bedside for transfer to TRAV 3, second RT (student) and nurse at bedside preparing for transfer by ambu bag 100%. Patient placed back on vent, continuing trial. Patient vitals stable. O2 100%.

## 2022-02-15 NOTE — SUBJECTIVE & OBJECTIVE
Interval History: The patient is resting.  No acute changes.  He tolerated dialysis on yesterday.      Review of patient's allergies indicates:  No Known Allergies  Current Facility-Administered Medications   Medication Frequency    0.9%  NaCl infusion PRN    acetaminophen tablet 500 mg Q6H PRN    albuterol nebulizer solution 2.5 mg Q4H PRN    bisacodyL EC tablet 10 mg Daily PRN    calcium gluconate 1 g in dextrose 5 % in water (D5W) 5 % 100 mL IVPB (MB+) Once    dextromethorphan-guaiFENesin  mg/5 ml liquid 10 mL Q6H PRN    dextrose 50% injection 12.5 g PRN    diltiaZEM tablet 60 mg Q6H    fentaNYL 50 mcg/hr 1 patch Q72H    glucagon (human recombinant) injection 1 mg PRN    heparin (porcine) injection 4,000 Units PRN    insulin aspart U-100 injection 1-10 Units Q6H    lorazepam injection 2 mg Q6H PRN    ondansetron injection 8 mg Q6H PRN    pantoprazole suspension 40 mg Daily    sevelamer carbonate pwpk 0.8 g TID WM    silver sulfADIAZINE 1% cream PRN    simethicone chewable tablet 80 mg TID PRN    ticagrelor tablet 90 mg BID       Objective:     Vital Signs (Most Recent):  Temp: 97.4 °F (36.3 °C) (02/15/22 1200)  Pulse: 93 (02/15/22 0700)  Resp: 13 (02/15/22 0700)  BP: 109/62 (02/15/22 1246)  SpO2: 100 % (02/15/22 0700)  O2 Device (Oxygen Therapy): ventilator (02/15/22 0713) Vital Signs (24h Range):  Temp:  [97.4 °F (36.3 °C)-99.9 °F (37.7 °C)] 97.4 °F (36.3 °C)  Pulse:  [] 93  Resp:  [13-33] 13  SpO2:  [91 %-100 %] 100 %  BP: ()/(48-76) 109/62     Weight: 86.6 kg (190 lb 14.7 oz) (02/15/22 0929)  Body mass index is 26.63 kg/m².  Body surface area is 2.08 meters squared.    I/O last 3 completed shifts:  In: 1450 [Other:400; NG/GT:1050]  Out: 400 [Other:400]    Physical Exam  Vitals reviewed.   HENT:      Head: Atraumatic.   Cardiovascular:      Rate and Rhythm: Regular rhythm.      Heart sounds: Normal heart sounds.   Pulmonary:      Breath sounds: Normal breath sounds.       Comments: Ventilated.  Abdominal:      Palpations: Abdomen is soft.   Neurological:      Mental Status: He is alert.         Significant Labs:  BMP:   Recent Labs   Lab 02/14/22  0705   *      K 3.9      CO2 27   BUN 56*   CREATININE 1.55*   CALCIUM 8.0*   MG 2.2     CBC:   Recent Labs   Lab 02/14/22  0704   WBC 14.58*   RBC 2.62*   HGB 7.9*   HCT 23.7*   *   MCV 90.5   MCH 30.2   MCHC 33.3        Significant Imaging:  Labs: Reviewed

## 2022-02-15 NOTE — ASSESSMENT & PLAN NOTE
- 12/14 intubated, tracheostomy 1/4  Weaning waxes and wanes     1/27- continue to increase as tolerated. Currently doing 4 hours SIMV with reduced rate of 4. - will transition his fentanyl infusion to a patch of 50mcg today    Continue weaning from the vent  1/31- plan for 5 hours tid today. Continue to increase as tolerated  2/1- He was not able to complete trial last night. We will continue with current plan and try again today  2/2- had issues yesterday due to tachycardia and anxiety. I have added ativan prn  2/3 was not able to do any trials yesterday. I am going to rest on the ventilator today  2/4 plan to resume some trials following HD today  2/5- still having issues with weaning trials. A lot of secretions. I have added mucinex.  2/6 on a trial this am and doing ok. Will continue throughout today as tolerated  2/7- plan to keep same weaning trials today  2/8- weaning has been slow. He is doing ok today with simv with reduce rate of 2.  02/11/2022 continue weaning  2/14- plan to increase weaning today.   2/15- looks like they have been doing reduced rate with assist control. I have switched him over the SIMV during my exam with reduced rate of 2. He is doing well. We will continue with this for his breathing trials.

## 2022-02-15 NOTE — ASSESSMENT & PLAN NOTE
Dialyzed yesterday  1/31/2022 Continue with scheduled hemodialysis.  2/1/2022 Continue with dialysis for this patient.  2/2/2022 Dialysis sessions are going well.  2/3/2022 Dialysis as scheduled for this patient.  2/4/2022 The patient is doing well with dialysis.  Metabolics are stable.  2/8/2022 Continue with dialysis for this patient.  2/14/2022 Continue with scheduled hemodialysis for this patient.  2/15/2022 Dialysis as scheduled for this patient.

## 2022-02-15 NOTE — PROGRESS NOTES
07:11- At bedside for routine ventilator assessment. Found patient resting comfortable. Patient ventilating on SIMV rate of 2, PS 15, FIO2 35%+5.     07:13- Placed patient back on AC/VC with prior ordered settings. HR 89, RR 25, O2 98%, /58.     Will continue to monitor. If all goes well, will trial again starting around 1300 unless further orders are obtained.

## 2022-02-15 NOTE — ASSESSMENT & PLAN NOTE
- s/p multiple debridements  - wound vac in place  - ID consulted for antibiotic management: He was treated with rocephin, daptomycin, clindamycin. 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 Amphotericin started on 1/7. The patient went to OR on 1/10 for debridement. Surgery note reviewed and appreciated.  1/12- back to OR today.  1/13- OR note reviewed. No purulence noted.    1/27- wound vac in place   2/1- seems to be improving  2/5 wound vac in place  2/9- antibiotics and amphotericin completed  2/14- no acute issues currently  2/15- plan to remove wound vac today due to some oozing of blood

## 2022-02-15 NOTE — ASSESSMENT & PLAN NOTE
- H/H down to 5.5/16 - pt had bleeding from his HD insertion site yesterday this has now resolved.   - will transfuse 2 units PRBCs on HD  1/2- 9.7/27.5 - repeat CBC in AM  1/4- Hgb is 7.2. Not sure if he will be transfused during surgery. IF not we will try to get  Him transfused with next HD  01/06/2021 transfuse 1 unit of packed red blood cells today  01/10/2021 needs a unit of blood  1/11- repeat H&H in am. Continue to transfuse as needed.  1/13- Hgb is 5. Transfusing today. Post transfusion H&H  1/14- CBC pending for today  1/15- 6/18 - hemodynamically stable, recheck in AM   1/16-- 6.1/17.1 - critical low blood shortage in hospital - holding transfusion until next HD since he is hemodynamically stable and not actively bleeding   01/17/2021 day transfuse blood on dialysis today  01/18/2020 to hemoglobin up to 9 after transfusion  01/25/2022 no signs of bleeding most recent hemoglobin 8  01/29/2022 patient will need transfusion today  1/31- Hgb 6.7 yesterday. Can transfuse with next HD session  2/2- H&H is pending  2/3- plan to transfuse 1 unit of PRBC's  2/4 Hgb is 6.9 today. We will transfuse another unit today  2/7 8.4 now  2/14- 7.9 today  2/15- oozing some today. We are going to hold heparin today. Continue to monitor H&H and transfuse as needed

## 2022-02-15 NOTE — SUBJECTIVE & OBJECTIVE
Interval History: No acute events overnight. Currently hemodynamically stable. Oxygenating adequately on the ventilator. Currently afebrile.     Objective:     Vital Signs (Most Recent):  Temp: 99.4 °F (37.4 °C) (02/15/22 0700)  Pulse: 93 (02/15/22 0700)  Resp: 13 (02/15/22 0700)  BP: 99/63 (02/15/22 0700)  SpO2: 100 % (02/15/22 0700) Vital Signs (24h Range):  Temp:  [99.1 °F (37.3 °C)-99.9 °F (37.7 °C)] 99.4 °F (37.4 °C)  Pulse:  [] 93  Resp:  [13-33] 13  SpO2:  [91 %-100 %] 100 %  BP: ()/(48-83) 99/63     Weight: 86.6 kg (190 lb 14.7 oz)  Body mass index is 26.63 kg/m².      Intake/Output Summary (Last 24 hours) at 2/15/2022 1205  Last data filed at 2/15/2022 0600  Gross per 24 hour   Intake 1450 ml   Output 400 ml   Net 1050 ml       Physical Exam  Vitals reviewed.   Constitutional:       General: He is not in acute distress.     Appearance: He is not ill-appearing.      Comments: Chronically ill appearing   HENT:      Head: Normocephalic and atraumatic.      Right Ear: External ear normal.      Left Ear: External ear normal.      Nose: Nose normal.      Mouth/Throat:      Mouth: Mucous membranes are moist.      Comments: trach  Eyes:      Conjunctiva/sclera: Conjunctivae normal.      Pupils: Pupils are equal, round, and reactive to light.   Cardiovascular:      Rate and Rhythm: Regular rhythm. Tachycardia present.      Pulses: Normal pulses.      Heart sounds: Normal heart sounds.   Pulmonary:      Effort: No respiratory distress.      Comments: clear breath sounds anteriorly  Abdominal:      Palpations: Abdomen is soft.   Musculoskeletal:         General: Normal range of motion.      Cervical back: Neck supple.   Lymphadenopathy:      Cervical: No cervical adenopathy.   Skin:     General: Skin is warm and dry.      Coloration: Skin is not pale.   Neurological:      Mental Status: Mental status is at baseline.      Comments: Remains on mechanical ventilation. Will track at times   Psychiatric:          Behavior: Behavior normal.         Vents:  Vent Mode: SIMV VC (02/15/22 0713)  Set Rate: 2 BPM (02/15/22 0713)  Vt Set: 480 mL (02/15/22 0713)  Pressure Support: 15 cmH20 (02/15/22 0713)  PEEP/CPAP: 5 cmH20 (02/15/22 0713)  Oxygen Concentration (%): 35 (02/15/22 0713)  Peak Airway Pressure: 21 cmH2O (02/15/22 0713)  Total Ve: 10.4 mL (02/15/22 0713)  F/VT Ratio<105 (RSBI): (!) 80.31 (02/14/22 1445)    Lines/Drains/Airways     Peripherally Inserted Central Catheter Line            PICC Double Lumen 01/05/22 left basilic 41 days          Central Venous Catheter Line                 Hemodialysis Catheter 12/30/21 0900 right internal jugular 47 days          Drain                 NG/OG Tube [place] sump 18 Fr. Left nostril -- days         Colostomy 12/18/21 1030 Descending/sigmoid LUQ 59 days          Airway                 Surgical Airway 01/04/22 Shiley 42 days                Significant Labs:    CBC/Anemia Profile:  Recent Labs   Lab 02/14/22  0704   WBC 14.58*   HGB 7.9*   HCT 23.7*   *   MCV 90.5   RDW 14.8*        Chemistries:  Recent Labs   Lab 02/14/22  0705      K 3.9      CO2 27   BUN 56*   CREATININE 1.55*   CALCIUM 8.0*   ALBUMIN 1.3*   PROT 7.4   BILITOT 0.4   ALKPHOS 631*   ALT 75*   *   MG 2.2       All pertinent labs within the past 24 hours have been reviewed.    Significant Imaging:  I have reviewed all pertinent imaging results/findings within the past 24 hours.

## 2022-02-15 NOTE — PROGRESS NOTES
[facility]  Nephrology  Progress Note    Patient Name: Og Garcia  MRN: [id number]  Admission Date: 12/23/2021  Hospital Length of Stay: 54 days  Attending Provider: Cecil Abernathy DO   Primary Care Physician: Hector Arndt DNP, FNP-C  Principal Problem:Denice gangrene    Subjective:     HPI: The patient is known from the previous nephrology consult at [facility].  He is no at Specialty and continues to need dialysis support.      Interval History: The patient is resting.  No acute changes.  He tolerated dialysis on yesterday.      Review of patient's allergies indicates:  No Known Allergies  Current Facility-Administered Medications   Medication Frequency    0.9%  NaCl infusion PRN    acetaminophen tablet 500 mg Q6H PRN    albuterol nebulizer solution 2.5 mg Q4H PRN    bisacodyL EC tablet 10 mg Daily PRN    calcium gluconate 1 g in dextrose 5 % in water (D5W) 5 % 100 mL IVPB (MB+) Once    dextromethorphan-guaiFENesin  mg/5 ml liquid 10 mL Q6H PRN    dextrose 50% injection 12.5 g PRN    diltiaZEM tablet 60 mg Q6H    fentaNYL 50 mcg/hr 1 patch Q72H    glucagon (human recombinant) injection 1 mg PRN    heparin (porcine) injection 4,000 Units PRN    insulin aspart U-100 injection 1-10 Units Q6H    lorazepam injection 2 mg Q6H PRN    ondansetron injection 8 mg Q6H PRN    pantoprazole suspension 40 mg Daily    sevelamer carbonate pwpk 0.8 g TID WM    silver sulfADIAZINE 1% cream PRN    simethicone chewable tablet 80 mg TID PRN    ticagrelor tablet 90 mg BID       Objective:     Vital Signs (Most Recent):  Temp: 97.4 °F (36.3 °C) (02/15/22 1200)  Pulse: 93 (02/15/22 0700)  Resp: 13 (02/15/22 0700)  BP: 109/62 (02/15/22 1246)  SpO2: 100 % (02/15/22 0700)  O2 Device (Oxygen Therapy): ventilator (02/15/22 0713) Vital Signs (24h Range):  Temp:  [97.4 °F (36.3 °C)-99.9 °F (37.7 °C)] 97.4 °F (36.3 °C)  Pulse:  [] 93  Resp:  [13-33] 13  SpO2:  [91 %-100 %] 100  %  BP: ()/(48-76) 109/62     Weight: 86.6 kg (190 lb 14.7 oz) (02/15/22 0929)  Body mass index is 26.63 kg/m².  Body surface area is 2.08 meters squared.    I/O last 3 completed shifts:  In: 1450 [Other:400; NG/GT:1050]  Out: 400 [Other:400]    Physical Exam  Vitals reviewed.   HENT:      Head: Atraumatic.   Cardiovascular:      Rate and Rhythm: Regular rhythm.      Heart sounds: Normal heart sounds.   Pulmonary:      Breath sounds: Normal breath sounds.      Comments: Ventilated.  Abdominal:      Palpations: Abdomen is soft.   Neurological:      Mental Status: He is alert.         Significant Labs:  BMP:   Recent Labs   Lab 02/14/22  0705   *      K 3.9      CO2 27   BUN 56*   CREATININE 1.55*   CALCIUM 8.0*   MG 2.2     CBC:   Recent Labs   Lab 02/14/22  0704   WBC 14.58*   RBC 2.62*   HGB 7.9*   HCT 23.7*   *   MCV 90.5   MCH 30.2   MCHC 33.3        Significant Imaging:  Labs: Reviewed    Assessment/Plan:     RAHAT (acute kidney injury)  Dialyzed yesterday  1/31/2022 Continue with scheduled hemodialysis.  2/1/2022 Continue with dialysis for this patient.  2/2/2022 Dialysis sessions are going well.  2/3/2022 Dialysis as scheduled for this patient.  2/4/2022 The patient is doing well with dialysis.  Metabolics are stable.  2/8/2022 Continue with dialysis for this patient.  2/14/2022 Continue with scheduled hemodialysis for this patient.  2/15/2022 Dialysis as scheduled for this patient.        Thank you for your consult. I will follow-up with patient. Please contact us if you have any additional questions.    Edwin Pryor Jr, MD  Nephrology  [facility]

## 2022-02-15 NOTE — PROGRESS NOTES
[facility]  Adult Nutrition  Follow-up Note         Reason for Assessment  Reason For Assessment: RD follow-up  Nutrition Risk Screen: tube feeding or parenteral nutrition  Malnutrition  Is Patient Malnourished: No  Nutrition Diagnosis  Excessive Mineral intake   related to Decreased ability to consume sufficient energy as evidenced by pt on vent    Nutrition Diagnosis Status: Chronic/ continues     Inadequate oral intake   related to Decreased ability to consume sufficient energy as evidenced by  Pt on vent    Nutrition Diagnosis Status: Chronic/ continues      Nutrition Risk  Level of Risk/Frequency of Follow-up: high   Chewing or Swallowing Difficulty?: Swallowing difficulty  Estimated/Assessed Needs  RMR ([place] Jeor Equation): 1668.13 Activity Factor: 1 Injury Factor: 1.2   Total Ve: 10.4 mL Temp: 99.4 °F (37.4 °C)Axillary  Weight Used For Calorie Calculations: 86.6 kg (190 lb 14.7 oz)   Energy Need Method: [facility] Energy Calorie Requirements (kcal): 1971  Weight Used For Protein Calculations: 86.8 kg (191 lb 5.8 oz)  Protein Requirements: 104-122  Estimated Fluid Requirement Method: RDA Method Fluid Requirements (mL): 1971  RDA Method (mL): 1971     Nutrition Prescription / Recommendations  Recommendation/Intervention: Tube feeding: Vital AF @ 75ml/hr;  H20 flush of 50ml q 4hr  Goals: pt will meet estimated nutritional needs; wound healing, TF tolerance  Nutrition Goal Status: progressing towards goal  Communication of RD Recs: reviewed with physician  Current Diet Order: NPO/ NG tube feeding  Nutrition Order Comments: Tube feeding: Vital AF @ 75ml/hr;  H20 flush of 50ml q 4hr  Current Nutrition Support Formula Ordered: Vital AF 1.2  Current Nutrition Support Rate Ordered: 75 (ml)  Current Nutrition Support Frequency Ordered: hourly  Recommended Diet: Enteral Nutrition Tube feeding: Vital AF @ 75ml/hr;  H20 flush of 50ml q 4hr  Recommended Oral Supplement: No Oral Supplements  Is  Nutrition Support Recommended: No  Is Education Recommended: No  Monitor and Evaluation  % current Intake: Enteral Nutrition progressing to goal  % intake to meet estimated needs: Enteral Nutrition   Food and Nutrient Intake: enteral nutrition intake  Food and Nutrient Adminstration: enteral and parenteral nutrition administration  Anthropometric Measurements: height/length,weight,weight change,body mass index  Biochemical Data, Medical Tests and Procedures: electrolyte and renal panel,glucose/endocrine profile  Nutrition-Focused Physical Findings: skin  Enteral Calories (kcal): 2160  Enteral Protein (gm): 135  Enteral (Free Water) Fluid (mL): 1458  Free Water Flush Fluid (mL): 300  Parenteral Calories (kcal): 845  Parenteral Protein (gm): 48  Parenteral Fluid (mL): 960  Lipid Calories (kcals): 500 kcals  Other Calories (kcal): 528 (propofol)  Total Calories (kcal): 2160  Total Calories (kcal/kg): 2612  % Kcal Needs: 100  Total Protein (gm): 135  % Protein Needs: 100  IV Fluid (mL): 1764  Total Fluid Intake (mL): 1758  Energy Calories Required: meeting needs  Protein Required: meeting needs  Fluid Required: meeting needs  Tolerance: tolerating  Current Medical Diagnosis and Past Medical History  Diagnosis: gastrointestinal disease,infection/sepsis  Past Medical History:   Diagnosis Date    Hyperlipidemia     Hypertension      Nutrition/Diet History  Spiritual, Cultural Beliefs, Buddhist Practices, Values that Affect Care: no  Food Allergies: NKFA  Factors Affecting Nutritional Intake: None identified at this time  Lab/Procedures/Meds  Recent Labs   Lab 02/14/22  0705      K 3.9   BUN 56*   CREATININE 1.55*   *   CALCIUM 8.0*   ALBUMIN 1.3*      ALT 75*   *     Last A1c: No results found for: HGBA1C  Lab Results   Component Value Date    RBC 2.62 (L) 02/14/2022    HGB 7.9 (L) 02/14/2022    HCT 23.7 (L) 02/14/2022    MCV 90.5 02/14/2022    MCH 30.2 02/14/2022    MCHC 33.3 02/14/2022  "    Pertinent Labs Reviewed: reviewed  Pertinent Labs Comments: Hct 23.7, BUN 56,Creat 1.55, Glu 138, Alb 1.3  Pertinent Medications Reviewed: reviewed  Pertinent Medications Comments: heparin, insulin  Anthropometrics  Temp: 99.4 °F (37.4 °C)  Height Method: Stated  Height: 5' 11" (180.3 cm)  Height (inches): 71 in  Weight Method: Bed Scale  Weight: 86.6 kg (190 lb 14.7 oz)  Weight (lb): 190.92 lb  Ideal Body Weight (IBW), Male: 172 lb  % Ideal Body Weight, Male (lb): 111 %  BMI (Calculated): 26.6  BMI Grade: 25 - 29.9 - overweight     Nutrition by Nursing  Diet/Nutrition Received: tube feeding     Diet/Feeding Assistance: none  Diet/Feeding Tolerance: good  Last Bowel Movement: 02/14/22       NG/OG Tube [place] sump 18 Fr. Left nostril-Feeding Type: continuous,by pump       NG/OG Tube [place] sump 18 Fr. Left nostril-Current Rate (mL/hr): 75 mL/hr       NG/OG Tube [place] sump 18 Fr. Left nostril-Goal Rate (mL/hr): 75 mL/hr       NG/OG Tube [place] sump 18 Fr. Left nostril-Formula Name: Vital  AF  Nutrition Follow-Up  RD Follow-up?: Yes  Assessment and Plan  No new Assessment & Plan notes have been filed under this hospital service since the last note was generated.  Service: Nutrition     "

## 2022-02-15 NOTE — PROGRESS NOTES
[facility]  Pulmonology  Progress Note    Patient Name: Og Garcia  MRN: [id number]  Admission Date: 12/23/2021  Hospital Length of Stay: 54 days  Code Status: Prior  Attending Provider: Cecil Abernathy DO  Primary Care Provider: Hector Arndt DNP, FNP-C   Principal Problem: Denice gangrene    Subjective:     Interval History: No acute events overnight. Currently hemodynamically stable. Oxygenating adequately on the ventilator. Currently afebrile.     Objective:     Vital Signs (Most Recent):  Temp: 99.4 °F (37.4 °C) (02/15/22 0700)  Pulse: 93 (02/15/22 0700)  Resp: 13 (02/15/22 0700)  BP: 99/63 (02/15/22 0700)  SpO2: 100 % (02/15/22 0700) Vital Signs (24h Range):  Temp:  [99.1 °F (37.3 °C)-99.9 °F (37.7 °C)] 99.4 °F (37.4 °C)  Pulse:  [] 93  Resp:  [13-33] 13  SpO2:  [91 %-100 %] 100 %  BP: ()/(48-83) 99/63     Weight: 86.6 kg (190 lb 14.7 oz)  Body mass index is 26.63 kg/m².      Intake/Output Summary (Last 24 hours) at 2/15/2022 1205  Last data filed at 2/15/2022 0600  Gross per 24 hour   Intake 1450 ml   Output 400 ml   Net 1050 ml       Physical Exam  Vitals reviewed.   Constitutional:       General: He is not in acute distress.     Appearance: He is not ill-appearing.      Comments: Chronically ill appearing   HENT:      Head: Normocephalic and atraumatic.      Right Ear: External ear normal.      Left Ear: External ear normal.      Nose: Nose normal.      Mouth/Throat:      Mouth: Mucous membranes are moist.      Comments: trach  Eyes:      Conjunctiva/sclera: Conjunctivae normal.      Pupils: Pupils are equal, round, and reactive to light.   Cardiovascular:      Rate and Rhythm: Regular rhythm. Tachycardia present.      Pulses: Normal pulses.      Heart sounds: Normal heart sounds.   Pulmonary:      Effort: No respiratory distress.      Comments: clear breath sounds anteriorly  Abdominal:      Palpations: Abdomen is soft.   Musculoskeletal:         General:  Normal range of motion.      Cervical back: Neck supple.   Lymphadenopathy:      Cervical: No cervical adenopathy.   Skin:     General: Skin is warm and dry.      Coloration: Skin is not pale.   Neurological:      Mental Status: Mental status is at baseline.      Comments: Remains on mechanical ventilation. Will track at times   Psychiatric:         Behavior: Behavior normal.         Vents:  Vent Mode: SIMV VC (02/15/22 0713)  Set Rate: 2 BPM (02/15/22 0713)  Vt Set: 480 mL (02/15/22 0713)  Pressure Support: 15 cmH20 (02/15/22 0713)  PEEP/CPAP: 5 cmH20 (02/15/22 0713)  Oxygen Concentration (%): 35 (02/15/22 0713)  Peak Airway Pressure: 21 cmH2O (02/15/22 0713)  Total Ve: 10.4 mL (02/15/22 0713)  F/VT Ratio<105 (RSBI): (!) 80.31 (02/14/22 1445)    Lines/Drains/Airways     Peripherally Inserted Central Catheter Line            PICC Double Lumen 01/05/22 left basilic 41 days          Central Venous Catheter Line                 Hemodialysis Catheter 12/30/21 0900 right internal jugular 47 days          Drain                 NG/OG Tube [place] sump 18 Fr. Left nostril -- days         Colostomy 12/18/21 1030 Descending/sigmoid LUQ 59 days          Airway                 Surgical Airway 01/04/22 Shiley 42 days                Significant Labs:    CBC/Anemia Profile:  Recent Labs   Lab 02/14/22  0704   WBC 14.58*   HGB 7.9*   HCT 23.7*   *   MCV 90.5   RDW 14.8*        Chemistries:  Recent Labs   Lab 02/14/22  0705      K 3.9      CO2 27   BUN 56*   CREATININE 1.55*   CALCIUM 8.0*   ALBUMIN 1.3*   PROT 7.4   BILITOT 0.4   ALKPHOS 631*   ALT 75*   *   MG 2.2       All pertinent labs within the past 24 hours have been reviewed.    Significant Imaging:  I have reviewed all pertinent imaging results/findings within the past 24 hours.    Assessment/Plan:     * Denice gangrene  - s/p multiple debridements  - wound vac in place  - ID consulted for antibiotic management: He was treated with rocephin,  daptomycin, clindamycin. 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 Amphotericin started on 1/7. The patient went to OR on 1/10 for debridement. Surgery note reviewed and appreciated.  1/12- back to OR today.  1/13- OR note reviewed. No purulence noted.    1/27- wound vac in place   2/1- seems to be improving  2/5 wound vac in place  2/9- antibiotics and amphotericin completed  2/14- no acute issues currently  2/15- plan to remove wound vac today due to some oozing of blood      Disseminated mucormycosis  Defer to Dr. Hall on all antibiotic choices---Her note has been reviewed and is appreciated.     Amphotericin until 02/03   zosyn and gent for pseudomonal double coverage until 02/01 2/4 treatment completed    Type 2 diabetes mellitus without complication, without long-term current use of insulin  - continue basal/bolus insulin  -accuchecks in last 24 hours look good      Acute blood loss anemia  - H/H down to 5.5/16 - pt had bleeding from his HD insertion site yesterday this has now resolved.   - will transfuse 2 units PRBCs on HD  1/2- 9.7/27.5 - repeat CBC in AM  1/4- Hgb is 7.2. Not sure if he will be transfused during surgery. IF not we will try to get  Him transfused with next HD  01/06/2021 transfuse 1 unit of packed red blood cells today  01/10/2021 needs a unit of blood  1/11- repeat H&H in am. Continue to transfuse as needed.  1/13- Hgb is 5. Transfusing today. Post transfusion H&H  1/14- CBC pending for today  1/15- 6/18 - hemodynamically stable, recheck in AM   1/16-- 6.1/17.1 - critical  low blood shortage in hospital - holding transfusion until next HD since he is hemodynamically stable and not actively bleeding   01/17/2021 day transfuse blood on dialysis today  01/18/2020 to hemoglobin up to 9 after transfusion  01/25/2022 no signs of bleeding most recent hemoglobin 8  01/29/2022 patient will need transfusion today  1/31- Hgb 6.7 yesterday. Can transfuse with next HD session  2/2- H&H is pending  2/3- plan to transfuse 1 unit of PRBC's  2/4 Hgb is 6.9 today. We will transfuse another unit today  2/7 8.4 now  2/14- 7.9 today  2/15- oozing some today. We are going to hold heparin today. Continue to monitor H&H and transfuse as needed    RAHAT (acute kidney injury)  - tunneled HD line placement 12/30  Continues with dialysis per nephrology              On mechanically assisted ventilation  - 12/14 intubated, tracheostomy 1/4  Weaning waxes and wanes     1/27- continue to increase as tolerated. Currently doing 4 hours SIMV with reduced rate of 4. - will transition his fentanyl infusion to a patch of 50mcg today    Continue weaning from the vent  1/31- plan for 5 hours tid today. Continue to increase as tolerated  2/1- He was not able to complete trial last night. We will continue with current plan and try again today  2/2- had issues yesterday due to tachycardia and anxiety. I have added ativan prn  2/3 was not able to do any trials yesterday. I am going to rest on the ventilator today  2/4 plan to resume some trials following HD today  2/5- still having issues with weaning trials. A lot of secretions. I have added mucinex.  2/6 on a trial this am and doing ok. Will continue throughout today as tolerated  2/7- plan to keep same weaning trials today  2/8- weaning has been slow. He is doing ok today with simv with reduce rate of 2.  02/11/2022 continue weaning  2/14- plan to increase weaning today.   2/15- looks like they have been doing reduced rate with assist control. I have switched him over the SIMV  during my exam with reduced rate of 2. He is doing well. We will continue with this for his breathing trials.     Hypertension  Currently hypotensive. Holding any antihypertensives  BP looks a little better this am. Weaning pressor as tolerated  1/21- No longer on pressor.   1/31- hemodynamically stable  Blood pressure looks good                 Cecil Abernathy,   Pulmonology  [facility]

## 2022-02-16 NOTE — NURSING
Wound vac removed and wet to dry dressing applied by wound care nurse, moderate amount of blood noted draining from abdomen. Wound vac to remain off for now d/t bleeding.

## 2022-02-16 NOTE — PROGRESS NOTES
[facility]  Wound Care  Progress Note    Patient Name: Og Garcia  MRN: [id number]  Admission Date: 12/23/2021  Hospital Length of Stay: 55 days  Attending Physician: Cecil Abernathy DO  Primary Care Provider: Hector Arndt DNP, FNP-C      Subjective:     HPI:   Og Garcia 66 y.o. male with wounds to abdomen, sacral, left lateral lower leg, right anterior foot, right hand, Left posterior heel, and right lateral and medial foot .He was admitted for Denice gangrene. He has been taking to surgery and is currently on IV antibiotics. Wounds are slowly progressing as anticipated. Pertinent factors that delay wound healing include multiple co-morbidities, poor vascular supply, excessive wound moisture and macerated tissue, large surface area, large volume and fragile skin.       Hospital Course:   No notes on file          Scheduled Meds:   calcium gluconate IVPB  1 g Intravenous Once    diltiaZEM  60 mg Per NG tube Q6H    fentaNYL  1 patch Transdermal Q72H    insulin aspart U-100  1-10 Units Subcutaneous Q6H    pantoprazole  40 mg Per NG tube Daily    sevelamer carbonate  0.8 g Per NG tube TID WM    ticagrelor  90 mg Per NG tube BID     Continuous Infusions:  PRN Meds:sodium chloride 0.9%, acetaminophen, albuterol sulfate, bisacodyL, dextromethorphan-guaiFENesin  mg/5 ml, dextrose 50%, glucagon (human recombinant), heparin (porcine), lorazepam, ondansetron, silver sulfADIAZINE 1%, simethicone    Review of Systems   Unable to perform ROS: Intubated     Objective:     Vital Signs (Most Recent):  Temp: 98.4 °F (36.9 °C) (02/16/22 2000)  Pulse: 101 (02/16/22 2245)  Resp: (!) 22 (02/16/22 2245)  BP: 111/63 (02/16/22 2245)  SpO2: 100 % (02/16/22 2245) Vital Signs (24h Range):  Temp:  [98.4 °F (36.9 °C)-100.2 °F (37.9 °C)] 98.4 °F (36.9 °C)  Pulse:  [] 101  Resp:  [11-37] 22  SpO2:  [100 %] 100 %  BP: ()/(47-67) 111/63     Weight: 84.5 kg (186 lb 4.6 oz)  Body  mass index is 25.98 kg/m².    Physical Exam  Vitals reviewed.   Constitutional:       Appearance: He is ill-appearing.   HENT:      Head: Normocephalic.   Cardiovascular:      Rate and Rhythm: Regular rhythm. Tachycardia present.      Pulses: Normal pulses.      Heart sounds: Normal heart sounds.   Pulmonary:      Effort: Pulmonary effort is normal.      Breath sounds: Normal breath sounds.   Abdominal:      Tenderness: There is abdominal tenderness.   Musculoskeletal:         General: Swelling, deformity and signs of injury present.      Right lower leg: Edema present.      Left lower leg: Edema present.   Skin:     Capillary Refill: Capillary refill takes more than 3 seconds.      Findings: Erythema and rash present.   Neurological:      Mental Status: Mental status is at baseline.   Psychiatric:         Mood and Affect: Mood normal.         Behavior: Behavior normal.         Thought Content: Thought content normal.         Judgment: Judgment normal.         Plan:   See wound assessment.   Continue NPWT to abdomen and Dakin's to sacral  Start silvadene to new wounds.   Cont turn protocol  Waffle boot  Continue low air loss mattress

## 2022-02-16 NOTE — PROGRESS NOTES
[facility]  Pulmonology  Progress Note    Patient Name: gO Garcia  MRN: [id number]  Admission Date: 12/23/2021  Hospital Length of Stay: 55 days  Code Status: Prior  Attending Provider: Cecil Abernathy DO  Primary Care Provider: Hector Arndt DNP, FNP-C   Principal Problem: Denice gangrene    Subjective:     Interval History: No acute events overnight. Currently hemodynamically stable. Oxygenating adequately on the ventilator. Currently afebrile.     Objective:     Vital Signs (Most Recent):  Temp: 98.7 °F (37.1 °C) (02/16/22 1115)  Pulse: 90 (02/16/22 1000)  Resp: 12 (02/16/22 1000)  BP: (!) 102/55 (02/16/22 1000)  SpO2: 100 % (02/16/22 1000) Vital Signs (24h Range):  Temp:  [97.4 °F (36.3 °C)-99.9 °F (37.7 °C)] 98.7 °F (37.1 °C)  Pulse:  [] 90  Resp:  [8-37] 12  SpO2:  [100 %] 100 %  BP: ()/(47-70) 102/55     Weight: 84.5 kg (186 lb 4.6 oz)  Body mass index is 25.98 kg/m².      Intake/Output Summary (Last 24 hours) at 2/16/2022 1156  Last data filed at 2/16/2022 0748  Gross per 24 hour   Intake 1170 ml   Output 200 ml   Net 970 ml       Physical Exam  Vitals reviewed.   Constitutional:       General: He is not in acute distress.     Appearance: He is not ill-appearing.      Comments: Chronically ill appearing   HENT:      Head: Normocephalic and atraumatic.      Right Ear: External ear normal.      Left Ear: External ear normal.      Nose: Nose normal.      Mouth/Throat:      Mouth: Mucous membranes are moist.      Comments: trach  Eyes:      Conjunctiva/sclera: Conjunctivae normal.      Pupils: Pupils are equal, round, and reactive to light.   Cardiovascular:      Rate and Rhythm: Regular rhythm. Tachycardia present.      Pulses: Normal pulses.      Heart sounds: Normal heart sounds.   Pulmonary:      Effort: No respiratory distress.      Comments: clear breath sounds anteriorly  Abdominal:      Palpations: Abdomen is soft.   Musculoskeletal:         General:  Normal range of motion.      Cervical back: Neck supple.   Lymphadenopathy:      Cervical: No cervical adenopathy.   Skin:     General: Skin is warm and dry.      Coloration: Skin is not pale.   Neurological:      Mental Status: Mental status is at baseline.      Comments: Remains on mechanical ventilation. Will track at times   Psychiatric:         Behavior: Behavior normal.         Vents:  Vent Mode: SIMV (02/16/22 0727)  Set Rate: 2 BPM (02/16/22 0727)  Vt Set: 480 mL (02/16/22 0727)  Pressure Support: 15 cmH20 (02/16/22 0727)  PEEP/CPAP: 5 cmH20 (02/16/22 0727)  Oxygen Concentration (%): 35 (02/16/22 1145)  Peak Airway Pressure: 21 cmH2O (02/16/22 0727)  Total Ve: 12 mL (02/16/22 0727)  F/VT Ratio<105 (RSBI): (!) 79.23 (02/15/22 1230)    Lines/Drains/Airways     Peripherally Inserted Central Catheter Line            PICC Double Lumen 01/05/22 left basilic 42 days          Central Venous Catheter Line                 Hemodialysis Catheter 12/30/21 0900 right internal jugular 48 days          Drain                 NG/OG Tube [place] sump 18 Fr. Left nostril -- days         Colostomy 12/18/21 1030 Descending/sigmoid LUQ 60 days          Airway                 Surgical Airway 01/04/22 Shiley 43 days                Significant Labs:    CBC/Anemia Profile:  No results for input(s): WBC, HGB, HCT, PLT, MCV, RDW, IRON, FERRITIN, RETIC, FOLATE, [id number], OCCULTBLOOD in the last 48 hours.     Chemistries:  No results for input(s): NA, K, CL, CO2, BUN, CREATININE, CALCIUM, ALBUMIN, PROT, BILITOT, ALKPHOS, ALT, AST, GLUCOSE, MG, PHOS in the last 48 hours.    All pertinent labs within the past 24 hours have been reviewed.    Significant Imaging:  I have reviewed all pertinent imaging results/findings within the past 24 hours.    Assessment/Plan:     * Denice gangrene  - s/p multiple debridements  - wound vac in place  - ID consulted for antibiotic management: He was treated with rocephin, daptomycin, clindamycin. 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 Amphotericin started on 1/7. The patient went to OR on 1/10 for debridement. Surgery note reviewed and appreciated.  1/12- back to OR today.  1/13- OR note reviewed. No purulence noted.    1/27- wound vac in place   2/1- seems to be improving  2/5 wound vac in place  2/9- antibiotics and amphotericin completed  2/14- no acute issues currently  2/15- plan to remove wound vac today due to some oozing of blood      Disseminated mucormycosis  Defer to Dr. Hall on all antibiotic choices---Her note has been reviewed and is appreciated.     Amphotericin until 02/03   zosyn and gent for pseudomonal double coverage until 02/01 2/4 treatment completed    Type 2 diabetes mellitus without complication, without long-term current use of insulin  - continue basal/bolus insulin  -accuchecks in last 24 hours look good      Acute blood loss anemia  - H/H down to 5.5/16 - pt had bleeding from his HD insertion site yesterday this has now resolved.   - will transfuse 2 units PRBCs on HD  1/2- 9.7/27.5 - repeat CBC in AM  1/4- Hgb is 7.2. Not sure if he will be transfused during surgery. IF not we will try to get  Him transfused with next HD  01/06/2021 transfuse 1 unit of packed red blood cells today  01/10/2021 needs a unit of blood  1/11- repeat H&H in am. Continue to transfuse as needed.  1/13- Hgb is 5. Transfusing today. Post transfusion H&H  1/14- CBC pending for today  1/15- 6/18 - hemodynamically stable, recheck in AM   1/16-- 6.1/17.1 - critical low blood shortage in hospital -  holding transfusion until next HD since he is hemodynamically stable and not actively bleeding   01/17/2021 day transfuse blood on dialysis today  01/18/2020 to hemoglobin up to 9 after transfusion  01/25/2022 no signs of bleeding most recent hemoglobin 8  01/29/2022 patient will need transfusion today  1/31- Hgb 6.7 yesterday. Can transfuse with next HD session  2/2- H&H is pending  2/3- plan to transfuse 1 unit of PRBC's  2/4 Hgb is 6.9 today. We will transfuse another unit today  2/7 8.4 now  2/14- 7.9 today  2/15- oozing some today. We are going to hold heparin today. Continue to monitor H&H and transfuse as needed  INR 1.22. Oozing has improved    RAHAT (acute kidney injury)  - tunneled HD line placement 12/30  Continues with dialysis per nephrology              On mechanically assisted ventilation  - 12/14 intubated, tracheostomy 1/4  Weaning waxes and wanes     1/27- continue to increase as tolerated. Currently doing 4 hours SIMV with reduced rate of 4. - will transition his fentanyl infusion to a patch of 50mcg today    Continue weaning from the vent  1/31- plan for 5 hours tid today. Continue to increase as tolerated  2/1- He was not able to complete trial last night. We will continue with current plan and try again today  2/2- had issues yesterday due to tachycardia and anxiety. I have added ativan prn  2/3 was not able to do any trials yesterday. I am going to rest on the ventilator today  2/4 plan to resume some trials following HD today  2/5- still having issues with weaning trials. A lot of secretions. I have added mucinex.  2/6 on a trial this am and doing ok. Will continue throughout today as tolerated  2/7- plan to keep same weaning trials today  2/8- weaning has been slow. He is doing ok today with simv with reduce rate of 2.  02/11/2022 continue weaning  2/14- plan to increase weaning today.   2/15- looks like they have been doing reduced rate with assist control. I have switched him over the SIMV  during my exam with reduced rate of 2. He is doing well. We will continue with this for his breathing trials.   2/16 Increase breathing trials as able. He does occasionally have some apnea but holds his sats    Hypertension  Currently hypotensive. Holding any antihypertensives  BP looks a little better this am. Weaning pressor as tolerated  1/21- No longer on pressor.   1/31- hemodynamically stable  Blood pressure looks good                 Cecil Abernathy, DO  Pulmonology  [facility]

## 2022-02-16 NOTE — SUBJECTIVE & OBJECTIVE
Interval History: The patient's condition is about the same.  No acute changes.      Review of patient's allergies indicates:  No Known Allergies  Current Facility-Administered Medications   Medication Frequency    0.9%  NaCl infusion PRN    acetaminophen tablet 500 mg Q6H PRN    albuterol nebulizer solution 2.5 mg Q4H PRN    bisacodyL EC tablet 10 mg Daily PRN    calcium gluconate 1 g in dextrose 5 % in water (D5W) 5 % 100 mL IVPB (MB+) Once    dextromethorphan-guaiFENesin  mg/5 ml liquid 10 mL Q6H PRN    dextrose 50% injection 12.5 g PRN    diltiaZEM tablet 60 mg Q6H    fentaNYL 50 mcg/hr 1 patch Q72H    glucagon (human recombinant) injection 1 mg PRN    heparin (porcine) injection 4,000 Units PRN    insulin aspart U-100 injection 1-10 Units Q6H    lorazepam injection 2 mg Q6H PRN    ondansetron injection 8 mg Q6H PRN    pantoprazole suspension 40 mg Daily    sevelamer carbonate pwpk 0.8 g TID WM    silver sulfADIAZINE 1% cream PRN    simethicone chewable tablet 80 mg TID PRN    ticagrelor tablet 90 mg BID       Objective:     Vital Signs (Most Recent):  Temp: 100.2 °F (37.9 °C) (02/16/22 1615)  Pulse: 90 (02/16/22 1000)  Resp: 12 (02/16/22 1000)  BP: 110/64 (02/16/22 1248)  SpO2: 100 % (02/16/22 1448)  O2 Device (Oxygen Therapy): ventilator (02/16/22 1448) Vital Signs (24h Range):  Temp:  [98.7 °F (37.1 °C)-100.2 °F (37.9 °C)] 100.2 °F (37.9 °C)  Pulse:  [] 90  Resp:  [10-37] 12  SpO2:  [100 %] 100 %  BP: ()/(47-70) 110/64     Weight: 84.5 kg (186 lb 4.6 oz) (02/16/22 0600)  Body mass index is 25.98 kg/m².  Body surface area is 2.06 meters squared.    I/O last 3 completed shifts:  In: 2100 [NG/GT:2100]  Out: 200 [Stool:200]    Physical Exam  Vitals reviewed.   Cardiovascular:      Rate and Rhythm: Regular rhythm.      Heart sounds: Normal heart sounds.   Pulmonary:      Breath sounds: Normal breath sounds.      Comments: ventilated  Abdominal:      General: Bowel sounds are  normal.      Palpations: Abdomen is soft.         Significant Labs:  BMP:   Recent Labs   Lab 02/14/22  0705   *      K 3.9      CO2 27   BUN 56*   CREATININE 1.55*   CALCIUM 8.0*   MG 2.2     CBC:   Recent Labs   Lab 02/14/22  0704   WBC 14.58*   RBC 2.62*   HGB 7.9*   HCT 23.7*   *   MCV 90.5   MCH 30.2   MCHC 33.3        Significant Imaging:  Labs: Reviewed

## 2022-02-16 NOTE — ASSESSMENT & PLAN NOTE
- H/H down to 5.5/16 - pt had bleeding from his HD insertion site yesterday this has now resolved.   - will transfuse 2 units PRBCs on HD  1/2- 9.7/27.5 - repeat CBC in AM  1/4- Hgb is 7.2. Not sure if he will be transfused during surgery. IF not we will try to get  Him transfused with next HD  01/06/2021 transfuse 1 unit of packed red blood cells today  01/10/2021 needs a unit of blood  1/11- repeat H&H in am. Continue to transfuse as needed.  1/13- Hgb is 5. Transfusing today. Post transfusion H&H  1/14- CBC pending for today  1/15- 6/18 - hemodynamically stable, recheck in AM   1/16-- 6.1/17.1 - critical low blood shortage in hospital - holding transfusion until next HD since he is hemodynamically stable and not actively bleeding   01/17/2021 day transfuse blood on dialysis today  01/18/2020 to hemoglobin up to 9 after transfusion  01/25/2022 no signs of bleeding most recent hemoglobin 8  01/29/2022 patient will need transfusion today  1/31- Hgb 6.7 yesterday. Can transfuse with next HD session  2/2- H&H is pending  2/3- plan to transfuse 1 unit of PRBC's  2/4 Hgb is 6.9 today. We will transfuse another unit today  2/7 8.4 now  2/14- 7.9 today  2/15- oozing some today. We are going to hold heparin today. Continue to monitor H&H and transfuse as needed  INR 1.22. Oozing has improved

## 2022-02-16 NOTE — PROGRESS NOTES
[facility]  Nephrology  Progress Note    Patient Name: Og Garcia  MRN: [id number]  Admission Date: 12/23/2021  Hospital Length of Stay: 55 days  Attending Provider: Cecil Abernathy DO   Primary Care Physician: Hector Arndt DNP, FNP-C  Principal Problem:Denice gangrene    Subjective:     HPI: The patient is known from the previous nephrology consult at [facility].  He is no at [facility] and continues to need dialysis support.      Interval History: The patient's condition is about the same.  No acute changes.      Review of patient's allergies indicates:  No Known Allergies  Current Facility-Administered Medications   Medication Frequency    0.9%  NaCl infusion PRN    acetaminophen tablet 500 mg Q6H PRN    albuterol nebulizer solution 2.5 mg Q4H PRN    bisacodyL EC tablet 10 mg Daily PRN    calcium gluconate 1 g in dextrose 5 % in water (D5W) 5 % 100 mL IVPB (MB+) Once    dextromethorphan-guaiFENesin  mg/5 ml liquid 10 mL Q6H PRN    dextrose 50% injection 12.5 g PRN    diltiaZEM tablet 60 mg Q6H    fentaNYL 50 mcg/hr 1 patch Q72H    glucagon (human recombinant) injection 1 mg PRN    heparin (porcine) injection 4,000 Units PRN    insulin aspart U-100 injection 1-10 Units Q6H    lorazepam injection 2 mg Q6H PRN    ondansetron injection 8 mg Q6H PRN    pantoprazole suspension 40 mg Daily    sevelamer carbonate pwpk 0.8 g TID WM    silver sulfADIAZINE 1% cream PRN    simethicone chewable tablet 80 mg TID PRN    ticagrelor tablet 90 mg BID       Objective:     Vital Signs (Most Recent):  Temp: 100.2 °F (37.9 °C) (02/16/22 1615)  Pulse: 90 (02/16/22 1000)  Resp: 12 (02/16/22 1000)  BP: 110/64 (02/16/22 1248)  SpO2: 100 % (02/16/22 1448)  O2 Device (Oxygen Therapy): ventilator (02/16/22 1448) Vital Signs (24h Range):  Temp:  [98.7 °F (37.1 °C)-100.2 °F (37.9 °C)] 100.2 °F (37.9 °C)  Pulse:  [] 90  Resp:  [10-37] 12  SpO2:  [100 %] 100 %  BP: ()/(47-70)  110/64     Weight: 84.5 kg (186 lb 4.6 oz) (02/16/22 0600)  Body mass index is 25.98 kg/m².  Body surface area is 2.06 meters squared.    I/O last 3 completed shifts:  In: 2100 [NG/GT:2100]  Out: 200 [Stool:200]    Physical Exam  Vitals reviewed.   Cardiovascular:      Rate and Rhythm: Regular rhythm.      Heart sounds: Normal heart sounds.   Pulmonary:      Breath sounds: Normal breath sounds.      Comments: ventilated  Abdominal:      General: Bowel sounds are normal.      Palpations: Abdomen is soft.         Significant Labs:  BMP:   Recent Labs   Lab 02/14/22  0705   *      K 3.9      CO2 27   BUN 56*   CREATININE 1.55*   CALCIUM 8.0*   MG 2.2     CBC:   Recent Labs   Lab 02/14/22  0704   WBC 14.58*   RBC 2.62*   HGB 7.9*   HCT 23.7*   *   MCV 90.5   MCH 30.2   MCHC 33.3        Significant Imaging:  Labs: Reviewed    Assessment/Plan:     RAHAT (acute kidney injury)  Dialyzed yesterday  1/31/2022 Continue with scheduled hemodialysis.  2/1/2022 Continue with dialysis for this patient.  2/2/2022 Dialysis sessions are going well.  2/3/2022 Dialysis as scheduled for this patient.  2/4/2022 The patient is doing well with dialysis.  Metabolics are stable.  2/8/2022 Continue with dialysis for this patient.  2/14/2022 Continue with scheduled hemodialysis for this patient.  2/15/2022 Dialysis as scheduled for this patient.  2/16/2022 Continue dialysis.        Thank you for your consult. I will follow-up with patient. Please contact us if you have any additional questions.    Edwin Pryor Jr, MD  Nephrology  [facility] [place]

## 2022-02-16 NOTE — SUBJECTIVE & OBJECTIVE
Interval History: No acute events overnight. Currently hemodynamically stable. Oxygenating adequately on the ventilator. Currently afebrile.     Objective:     Vital Signs (Most Recent):  Temp: 98.7 °F (37.1 °C) (02/16/22 1115)  Pulse: 90 (02/16/22 1000)  Resp: 12 (02/16/22 1000)  BP: (!) 102/55 (02/16/22 1000)  SpO2: 100 % (02/16/22 1000) Vital Signs (24h Range):  Temp:  [97.4 °F (36.3 °C)-99.9 °F (37.7 °C)] 98.7 °F (37.1 °C)  Pulse:  [] 90  Resp:  [8-37] 12  SpO2:  [100 %] 100 %  BP: ()/(47-70) 102/55     Weight: 84.5 kg (186 lb 4.6 oz)  Body mass index is 25.98 kg/m².      Intake/Output Summary (Last 24 hours) at 2/16/2022 1156  Last data filed at 2/16/2022 0748  Gross per 24 hour   Intake 1170 ml   Output 200 ml   Net 970 ml       Physical Exam  Vitals reviewed.   Constitutional:       General: He is not in acute distress.     Appearance: He is not ill-appearing.      Comments: Chronically ill appearing   HENT:      Head: Normocephalic and atraumatic.      Right Ear: External ear normal.      Left Ear: External ear normal.      Nose: Nose normal.      Mouth/Throat:      Mouth: Mucous membranes are moist.      Comments: trach  Eyes:      Conjunctiva/sclera: Conjunctivae normal.      Pupils: Pupils are equal, round, and reactive to light.   Cardiovascular:      Rate and Rhythm: Regular rhythm. Tachycardia present.      Pulses: Normal pulses.      Heart sounds: Normal heart sounds.   Pulmonary:      Effort: No respiratory distress.      Comments: clear breath sounds anteriorly  Abdominal:      Palpations: Abdomen is soft.   Musculoskeletal:         General: Normal range of motion.      Cervical back: Neck supple.   Lymphadenopathy:      Cervical: No cervical adenopathy.   Skin:     General: Skin is warm and dry.      Coloration: Skin is not pale.   Neurological:      Mental Status: Mental status is at baseline.      Comments: Remains on mechanical ventilation. Will track at times   Psychiatric:          Behavior: Behavior normal.         Vents:  Vent Mode: SIMV (02/16/22 0727)  Set Rate: 2 BPM (02/16/22 0727)  Vt Set: 480 mL (02/16/22 0727)  Pressure Support: 15 cmH20 (02/16/22 0727)  PEEP/CPAP: 5 cmH20 (02/16/22 0727)  Oxygen Concentration (%): 35 (02/16/22 1145)  Peak Airway Pressure: 21 cmH2O (02/16/22 0727)  Total Ve: 12 mL (02/16/22 0727)  F/VT Ratio<105 (RSBI): (!) 79.23 (02/15/22 1230)    Lines/Drains/Airways     Peripherally Inserted Central Catheter Line            PICC Double Lumen 01/05/22 left basilic 42 days          Central Venous Catheter Line                 Hemodialysis Catheter 12/30/21 0900 right internal jugular 48 days          Drain                 NG/OG Tube [place] sump 18 Fr. Left nostril -- days         Colostomy 12/18/21 1030 Descending/sigmoid LUQ 60 days          Airway                 Surgical Airway 01/04/22 [place] 43 days                Significant Labs:    CBC/Anemia Profile:  No results for input(s): WBC, HGB, HCT, PLT, MCV, RDW, IRON, FERRITIN, RETIC, FOLATE, [id number], OCCULTBLOOD in the last 48 hours.     Chemistries:  No results for input(s): NA, K, CL, CO2, BUN, CREATININE, CALCIUM, ALBUMIN, PROT, BILITOT, ALKPHOS, ALT, AST, GLUCOSE, MG, PHOS in the last 48 hours.    All pertinent labs within the past 24 hours have been reviewed.    Significant Imaging:  I have reviewed all pertinent imaging results/findings within the past 24 hours.

## 2022-02-16 NOTE — ASSESSMENT & PLAN NOTE
- 12/14 intubated, tracheostomy 1/4  Weaning waxes and wanes     1/27- continue to increase as tolerated. Currently doing 4 hours SIMV with reduced rate of 4. - will transition his fentanyl infusion to a patch of 50mcg today    Continue weaning from the vent  1/31- plan for 5 hours tid today. Continue to increase as tolerated  2/1- He was not able to complete trial last night. We will continue with current plan and try again today  2/2- had issues yesterday due to tachycardia and anxiety. I have added ativan prn  2/3 was not able to do any trials yesterday. I am going to rest on the ventilator today  2/4 plan to resume some trials following HD today  2/5- still having issues with weaning trials. A lot of secretions. I have added mucinex.  2/6 on a trial this am and doing ok. Will continue throughout today as tolerated  2/7- plan to keep same weaning trials today  2/8- weaning has been slow. He is doing ok today with simv with reduce rate of 2.  02/11/2022 continue weaning  2/14- plan to increase weaning today.   2/15- looks like they have been doing reduced rate with assist control. I have switched him over the SIMV during my exam with reduced rate of 2. He is doing well. We will continue with this for his breathing trials.   2/16 Increase breathing trials as able. He does occasionally have some apnea but holds his sats

## 2022-02-16 NOTE — ASSESSMENT & PLAN NOTE
Dialyzed yesterday  1/31/2022 Continue with scheduled hemodialysis.  2/1/2022 Continue with dialysis for this patient.  2/2/2022 Dialysis sessions are going well.  2/3/2022 Dialysis as scheduled for this patient.  2/4/2022 The patient is doing well with dialysis.  Metabolics are stable.  2/8/2022 Continue with dialysis for this patient.  2/14/2022 Continue with scheduled hemodialysis for this patient.  2/15/2022 Dialysis as scheduled for this patient.  2/16/2022 Continue dialysis.

## 2022-02-16 NOTE — NURSING
Notified Dr. Abernathy of initial bleeding from abdomen and held heparin. Second dressing remains dry and intact. Order rec'd to obtain pt/inr.

## 2022-02-16 NOTE — PROGRESS NOTES
Wound care note;  Patient skin was evaluated today with dressing changes.  Patient in bed, alert.  ELSA ANDREWP assessed wounds today.  Removed NPWT foam from abdomen, bleeding noted,, compression dressing applied, Bleed through dressing. ELSA BAINS then applied surgicel to abdominal wound. Abdominal wound with edges closed/resurfacing tissue noted. No undermining noted.   Sacral wound with pink tan full thickness tissue loss noted.  Perineum /rectal wound with red granular tissue   Cont Dakins moist gauze to sacral/perineum wounds.  Left lateral lower leg and Right anterior foot with trauma /abrasion wounds noted.  Right hand with tan /pink tissue, Left posterior heel with dark black tissue , cont silvadene cream to both areas.   Right lateral and Right medial foot with purple discolored skin, cont aquacel foam border for protection  Cont turn protocol  Waffle boots  On low air loss mattress  D/C NPWT to abdomen for now.  Wound care team to follow

## 2022-02-16 NOTE — NURSING
Dressing placed to abdomen now saturated with blood, called wound care to assess further. Wound care nurse and FNP at bedside replaced wet to dry dressing with surgicel and gauze dressing. Will continue to monitor for bleeding.

## 2022-02-17 NOTE — ANESTHESIA PREPROCEDURE EVALUATION
02/17/2022  Og Garcia is a 66 y.o., male.  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    I have reviewed the NPO Status.   I have reviewed the Medications.     Review of Systems  Hematology/Oncology:         -- Anemia:   Cardiovascular:   Hypertension ECG has been reviewed.    Pulmonary:   trach   Renal/:   Chronic Renal Disease, ARF    Endocrine:   Diabetes        Chemistry        Component Value Date/Time     02/21/2022 0824    K 5.1 02/21/2022 0824     02/21/2022 0824    CO2 24 02/21/2022 0824    BUN 81 (H) 02/21/2022 0824    CREATININE 3.44 (H) 02/21/2022 0824     02/21/2022 0824        Component Value Date/Time    CALCIUM 8.9 02/21/2022 0824    ALKPHOS 631 (H) 02/14/2022 0705     (H) 02/14/2022 0705    ALT 75 (H) 02/14/2022 0705    BILITOT 0.4 02/14/2022 0705    ESTGFRAFRICA 15 (L) 01/10/2022 0539    EGFRNONAA 19 (L) 02/21/2022 0824        Lab Results   Component Value Date    WBC 18.47 (H) 02/21/2022    RBC 2.20 (L) 02/21/2022    HGB 6.5 (L) 02/21/2022    MCV 91.4 02/21/2022    MCH 29.5 02/21/2022    MCHC 32.3 02/21/2022    RDW 14.6 (H) 02/21/2022     (H) 02/21/2022    MPV 9.0 (L) 02/21/2022    LYMPH 19.5 (L) 02/21/2022    LYMPH 3.60 02/21/2022    MONO 9.9 (H) 02/21/2022    EOS 1.50 (H) 02/21/2022    BASO 0.06  02/21/2022     Results for orders placed or performed during the hospital encounter of 12/13/21   EKG 12-lead    Collection Time: 12/17/21  3:46 PM    Narrative    Test Reason : Q24.9,    Vent. Rate : 094 BPM     Atrial Rate : 000 BPM     P-R Int : 140 ms          QRS Dur : 150 ms      QT Int : 394 ms       P-R-T Axes : 070 -53 048 degrees     QTc Int : 454 ms    Sinus rhythm  RBBB with left anterior fascicular block  Inferior infarct - age undetermined  Abnormal ECG    Confirmed by Zachary Mosley DO ([id number]) on 12/22/2021 10:01:35 AM    Referred By: [id number]   SELF           Confirmed By:Zachary Mosley DO         Physical Exam  General:  Well nourished      Airway/Jaw/Neck:  Airway Findings: Mouth Opening: Normal   Pre-Existing Airway Tube(s): Tracheostomy tube Mallampati: II     Eyes/Ears/Nose:  Eyes/Ears/Nose Findings:     Chest/Lungs:  Chest/Lungs Findings:        Mental Status:  Mental Status Findings:  Cooperative, Alert and Oriented         Anesthesia Plan  Type of Anesthesia, risks & benefits discussed:  Anesthesia Type:  general    Patient's Preference:   Plan Factors:          Intra-op Monitoring Plan: standard ASA monitors  Intra-op Monitoring Plan Comments:   Post Op Pain Control Plan: IV/PO Opioids PRN and multimodal analgesia  Post Op Pain Control Plan Comments:     Induction:   IV  Beta Blocker:         Informed Consent: Patient representative understands risks and agrees with Anesthesia plan.  Questions answered. Anesthesia consent signed with patient.  ASA Score: 4     Day of Surgery Review of History & Physical:            Ready For Surgery From Anesthesia Perspective.

## 2022-02-17 NOTE — PROGRESS NOTES
[facility] [place] [facility]\""  Pulmonology  Progress Note    Patient Name: Og Garcia  MRN: [id number]  Admission Date: 12/23/2021  Hospital Length of Stay: 56 days  Code Status: Prior  Attending Provider: Cecil Abernathy DO  Primary Care Provider: Hector Arndt DNP, FNP-C   Principal Problem: Denice gangrene    Subjective:     Interval History: No acute events overnight. Currently hemodynamically stable. Oxygenating adequately on the ventilator. Currently afebrile. Plan to go back to OR today.    Objective:     Vital Signs (Most Recent):  Temp: 98.4 °F (36.9 °C) (02/17/22 1100)  Pulse: 105 (02/17/22 0830)  Resp: (!) 24 (02/17/22 0830)  BP: (!) 112/58 (02/17/22 0830)  SpO2: 100 % (02/17/22 1118) Vital Signs (24h Range):  Temp:  [97.7 °F (36.5 °C)-100.2 °F (37.9 °C)] 98.4 °F (36.9 °C)  Pulse:  [] 105  Resp:  [12-37] 24  SpO2:  [100 %] 100 %  BP: ()/(47-67) 112/58     Weight: 80.8 kg (178 lb 2.1 oz)  Body mass index is 24.84 kg/m².      Intake/Output Summary (Last 24 hours) at 2/17/2022 1141  Last data filed at 2/17/2022 0500  Gross per 24 hour   Intake 975 ml   Output 2100 ml   Net -1125 ml       Physical Exam  Vitals reviewed.   Constitutional:       General: He is not in acute distress.     Appearance: He is not ill-appearing.      Comments: Chronically ill appearing   HENT:      Head: Normocephalic and atraumatic.      Right Ear: External ear normal.      Left Ear: External ear normal.      Nose: Nose normal.      Mouth/Throat:      Mouth: Mucous membranes are moist.      Comments: trach  Eyes:      Conjunctiva/sclera: Conjunctivae normal.      Pupils: Pupils are equal, round, and reactive to light.   Cardiovascular:      Rate and Rhythm: Regular rhythm. Tachycardia present.      Pulses: Normal pulses.      Heart sounds: Normal heart sounds.   Pulmonary:      Effort: No respiratory distress.      Comments: clear breath sounds anteriorly  Abdominal:      Palpations: Abdomen is soft.    Musculoskeletal:         General: Normal range of motion.      Cervical back: Neck supple.   Lymphadenopathy:      Cervical: No cervical adenopathy.   Skin:     General: Skin is warm and dry.      Coloration: Skin is not pale.   Neurological:      Mental Status: Mental status is at baseline.      Comments: Remains on mechanical ventilation. Will track at times   Psychiatric:         Behavior: Behavior normal.         Vents:  Vent Mode: A/C (02/17/22 1118)  Set Rate: 16 BPM (02/17/22 1118)  Vt Set: 480 mL (02/17/22 1118)  Pressure Support: 15 cmH20 (02/16/22 0727)  PEEP/CPAP: 5 cmH20 (02/17/22 1118)  Oxygen Concentration (%): 40 (02/17/22 1118)  Peak Airway Pressure: 18 cmH2O (02/17/22 1118)  Total Ve: 10.8 mL (02/17/22 1118)  F/VT Ratio<105 (RSBI): (!) 79.65 (02/16/22 1953)    Lines/Drains/Airways     Peripherally Inserted Central Catheter Line            PICC Double Lumen 01/05/22 left basilic 43 days          Central Venous Catheter Line                 Hemodialysis Catheter 12/30/21 0900 right internal jugular 49 days          Drain                 NG/OG Tube [place] sump 18 Fr. Left nostril -- days         Colostomy 12/18/21 1030 Descending/sigmoid LUQ 61 days          Airway                 Surgical Airway 01/04/22 Shiley 44 days                Significant Labs:    CBC/Anemia Profile:  No results for input(s): WBC, HGB, HCT, PLT, MCV, RDW, IRON, FERRITIN, RETIC, FOLATE, [id number], OCCULTBLOOD in the last 48 hours.     Chemistries:  No results for input(s): NA, K, CL, CO2, BUN, CREATININE, CALCIUM, ALBUMIN, PROT, BILITOT, ALKPHOS, ALT, AST, GLUCOSE, MG, PHOS in the last 48 hours.    All pertinent labs within the past 24 hours have been reviewed.    Significant Imaging:  I have reviewed all pertinent imaging results/findings within the past 24 hours.    Assessment/Plan:     * Denice gangrene  - s/p multiple debridements  - wound vac in place  - ID consulted for antibiotic management: He was treated with  rocephin, daptomycin, clindamycin. 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 Amphotericin started on 1/7. The patient went to OR on 1/10 for debridement. Surgery note reviewed and appreciated.  1/12- back to OR today.  1/13- OR note reviewed. No purulence noted.    1/27- wound vac in place   2/1- seems to be improving  2/5 wound vac in place  2/9- antibiotics and amphotericin completed  2/14- no acute issues currently  2/15- plan to remove wound vac today due to some oozing of blood  2/17- patient is going back to OR today with Dr. Cazares      Disseminated mucormycosis  Defer to Dr. Hall on all antibiotic choices---Her note has been reviewed and is appreciated.     Amphotericin until 02/03   zosyn and gent for pseudomonal double coverage until 02/01 2/4 treatment completed    Type 2 diabetes mellitus without complication, without long-term current use of insulin  - continue basal/bolus insulin  -accuchecks in last 24 hours look good      Acute blood loss anemia  - H/H down to 5.5/16 - pt had bleeding from his HD insertion site yesterday this has now resolved.   - will transfuse 2 units PRBCs on HD  1/2- 9.7/27.5 - repeat CBC in AM  1/4- Hgb is 7.2. Not sure if he will be transfused during surgery. IF not we will try to get  Him transfused with next HD  01/06/2021 transfuse 1 unit of packed red blood cells today  01/10/2021 needs a unit of blood  1/11- repeat H&H in am. Continue to transfuse as needed.  1/13- Hgb is 5. Transfusing today. Post transfusion H&H  1/14- CBC pending for today  1/15- 6/18 -  hemodynamically stable, recheck in AM   1/16-- 6.1/17.1 - critical low blood shortage in hospital - holding transfusion until next HD since he is hemodynamically stable and not actively bleeding   01/17/2021 day transfuse blood on dialysis today  01/18/2020 to hemoglobin up to 9 after transfusion  01/25/2022 no signs of bleeding most recent hemoglobin 8  01/29/2022 patient will need transfusion today  1/31- Hgb 6.7 yesterday. Can transfuse with next HD session  2/2- H&H is pending  2/3- plan to transfuse 1 unit of PRBC's  2/4 Hgb is 6.9 today. We will transfuse another unit today  2/7 8.4 now  2/14- 7.9 today  2/15- oozing some today. We are going to hold heparin today. Continue to monitor H&H and transfuse as needed  INR 1.22. Oozing has improved    RAHAT (acute kidney injury)  - tunneled HD line placement 12/30  Continues with dialysis per nephrology              On mechanically assisted ventilation  - 12/14 intubated, tracheostomy 1/4  Weaning waxes and wanes     1/27- continue to increase as tolerated. Currently doing 4 hours SIMV with reduced rate of 4. - will transition his fentanyl infusion to a patch of 50mcg today    Continue weaning from the vent  1/31- plan for 5 hours tid today. Continue to increase as tolerated  2/1- He was not able to complete trial last night. We will continue with current plan and try again today  2/2- had issues yesterday due to tachycardia and anxiety. I have added ativan prn  2/3 was not able to do any trials yesterday. I am going to rest on the ventilator today  2/4 plan to resume some trials following HD today  2/5- still having issues with weaning trials. A lot of secretions. I have added mucinex.  2/6 on a trial this am and doing ok. Will continue throughout today as tolerated  2/7- plan to keep same weaning trials today  2/8- weaning has been slow. He is doing ok today with simv with reduce rate of 2.  02/11/2022 continue weaning  2/14- plan to increase weaning today.   2/15-  looks like they have been doing reduced rate with assist control. I have switched him over the SIMV during my exam with reduced rate of 2. He is doing well. We will continue with this for his breathing trials.   2/16 Increase breathing trials as able. He does occasionally have some apnea but holds his sats  2/17- Back to OR today. Will hold on increasing trials today but can resume tomorrow    Hypertension  Currently hypotensive. Holding any antihypertensives  BP looks a little better this am. Weaning pressor as tolerated  1/21- No longer on pressor.   1/31- hemodynamically stable  Blood pressure looks good                 Cecil Abernathy, DO  Pulmonology  [facility]

## 2022-02-17 NOTE — ASSESSMENT & PLAN NOTE
Dialyzed yesterday  1/31/2022 Continue with scheduled hemodialysis.  2/1/2022 Continue with dialysis for this patient.  2/2/2022 Dialysis sessions are going well.  2/3/2022 Dialysis as scheduled for this patient.  2/4/2022 The patient is doing well with dialysis.  Metabolics are stable.  2/8/2022 Continue with dialysis for this patient.  2/14/2022 Continue with scheduled hemodialysis for this patient.  2/15/2022 Dialysis as scheduled for this patient.  2/16/2022 Continue dialysis.  2/17/2022 The condition is the same.

## 2022-02-17 NOTE — PLAN OF CARE
Problem: Adult Inpatient Plan of Care  Goal: Plan of Care Review  Outcome: Ongoing, Progressing  Goal: Patient-Specific Goal (Individualized)  Outcome: Ongoing, Progressing  Goal: Optimal Comfort and Wellbeing  Outcome: Ongoing, Progressing     Problem: Bleeding (Sepsis/Septic Shock)  Goal: Absence of Bleeding  Outcome: Ongoing, Progressing     Problem: Infection Progression (Sepsis/Septic Shock)  Goal: Absence of Infection Signs and Symptoms  Outcome: Ongoing, Progressing     Problem: Communication Impairment (Mechanical Ventilation, Invasive)  Goal: Effective Communication  Outcome: Ongoing, Progressing     Problem: Skin and Tissue Injury (Mechanical Ventilation, Invasive)  Goal: Absence of Device-Related Skin and Tissue Injury  Outcome: Ongoing, Progressing

## 2022-02-17 NOTE — PLAN OF CARE
Problem: Adult Inpatient Plan of Care  Goal: Plan of Care Review  Outcome: Ongoing, Progressing  Goal: Patient-Specific Goal (Individualized)  Outcome: Ongoing, Progressing  Goal: Absence of Hospital-Acquired Illness or Injury  Outcome: Ongoing, Progressing  Goal: Optimal Comfort and Wellbeing  Outcome: Ongoing, Progressing  Goal: Readiness for Transition of Care  Outcome: Ongoing, Progressing     Problem: Adjustment to Illness (Sepsis/Septic Shock)  Goal: Optimal Coping  Outcome: Ongoing, Progressing     Problem: Bleeding (Sepsis/Septic Shock)  Goal: Absence of Bleeding  Outcome: Ongoing, Progressing     Problem: Glycemic Control Impaired (Sepsis/Septic Shock)  Goal: Blood Glucose Level Within Desired Range  Outcome: Ongoing, Progressing     Problem: Infection Progression (Sepsis/Septic Shock)  Goal: Absence of Infection Signs and Symptoms  Outcome: Ongoing, Progressing     Problem: Nutrition Impaired (Sepsis/Septic Shock)  Goal: Optimal Nutrition Intake  Outcome: Ongoing, Progressing     Problem: Fluid and Electrolyte Imbalance (Acute Kidney Injury/Impairment)  Goal: Fluid and Electrolyte Balance  Outcome: Ongoing, Progressing     Problem: Oral Intake Inadequate (Acute Kidney Injury/Impairment)  Goal: Optimal Nutrition Intake  Outcome: Ongoing, Progressing     Problem: Renal Function Impairment (Acute Kidney Injury/Impairment)  Goal: Effective Renal Function  Outcome: Ongoing, Progressing     Problem: Infection  Goal: Absence of Infection Signs and Symptoms  Outcome: Ongoing, Progressing     Problem: Fall Injury Risk  Goal: Absence of Fall and Fall-Related Injury  Outcome: Ongoing, Progressing     Problem: Communication Impairment (Mechanical Ventilation, Invasive)  Goal: Effective Communication  Outcome: Ongoing, Progressing     Problem: Device-Related Complication Risk (Mechanical Ventilation, Invasive)  Goal: Optimal Device Function  Outcome: Ongoing, Progressing     Problem: Inability to Wean (Mechanical  Ventilation, Invasive)  Goal: Mechanical Ventilation Liberation  Outcome: Ongoing, Progressing     Problem: Nutrition Impairment (Mechanical Ventilation, Invasive)  Goal: Optimal Nutrition Delivery  Outcome: Ongoing, Progressing     Problem: Skin and Tissue Injury (Mechanical Ventilation, Invasive)  Goal: Absence of Device-Related Skin and Tissue Injury  Outcome: Ongoing, Progressing     Problem: Ventilator-Induced Lung Injury (Mechanical Ventilation, Invasive)  Goal: Absence of Ventilator-Induced Lung Injury  Outcome: Ongoing, Progressing     Problem: Communication Impairment (Artificial Airway)  Goal: Effective Communication  Outcome: Ongoing, Progressing     Problem: Skin and Tissue Injury (Artificial Airway)  Goal: Absence of Device-Related Skin or Tissue Injury  Outcome: Ongoing, Progressing     Problem: Noninvasive Ventilation Acute  Goal: Effective Unassisted Ventilation and Oxygenation  Outcome: Ongoing, Progressing     Problem: Skin Injury Risk Increased  Goal: Skin Health and Integrity  Outcome: Ongoing, Progressing     Problem: Device-Related Complication Risk (Hemodialysis)  Goal: Safe, Effective Therapy Delivery  Outcome: Ongoing, Progressing     Problem: Infection (Hemodialysis)  Goal: Absence of Infection Signs and Symptoms  Outcome: Ongoing, Progressing     Problem: Diabetes Comorbidity  Goal: Blood Glucose Level Within Targeted Range  Outcome: Ongoing, Progressing     Problem: Impaired Wound Healing  Goal: Optimal Wound Healing  Outcome: Ongoing, Progressing     Problem: Gas Exchange Impaired  Goal: Optimal Gas Exchange  Outcome: Ongoing, Progressing

## 2022-02-17 NOTE — SUBJECTIVE & OBJECTIVE
Interval History: No acute events overnight. Currently hemodynamically stable. Oxygenating adequately on the ventilator. Currently afebrile. Plan to go back to OR today.    Objective:     Vital Signs (Most Recent):  Temp: 98.4 °F (36.9 °C) (02/17/22 1100)  Pulse: 105 (02/17/22 0830)  Resp: (!) 24 (02/17/22 0830)  BP: (!) 112/58 (02/17/22 0830)  SpO2: 100 % (02/17/22 1118) Vital Signs (24h Range):  Temp:  [97.7 °F (36.5 °C)-100.2 °F (37.9 °C)] 98.4 °F (36.9 °C)  Pulse:  [] 105  Resp:  [12-37] 24  SpO2:  [100 %] 100 %  BP: ()/(47-67) 112/58     Weight: 80.8 kg (178 lb 2.1 oz)  Body mass index is 24.84 kg/m².      Intake/Output Summary (Last 24 hours) at 2/17/2022 1141  Last data filed at 2/17/2022 0500  Gross per 24 hour   Intake 975 ml   Output 2100 ml   Net -1125 ml       Physical Exam  Vitals reviewed.   Constitutional:       General: He is not in acute distress.     Appearance: He is not ill-appearing.      Comments: Chronically ill appearing   HENT:      Head: Normocephalic and atraumatic.      Right Ear: External ear normal.      Left Ear: External ear normal.      Nose: Nose normal.      Mouth/Throat:      Mouth: Mucous membranes are moist.      Comments: trach  Eyes:      Conjunctiva/sclera: Conjunctivae normal.      Pupils: Pupils are equal, round, and reactive to light.   Cardiovascular:      Rate and Rhythm: Regular rhythm. Tachycardia present.      Pulses: Normal pulses.      Heart sounds: Normal heart sounds.   Pulmonary:      Effort: No respiratory distress.      Comments: clear breath sounds anteriorly  Abdominal:      Palpations: Abdomen is soft.   Musculoskeletal:         General: Normal range of motion.      Cervical back: Neck supple.   Lymphadenopathy:      Cervical: No cervical adenopathy.   Skin:     General: Skin is warm and dry.      Coloration: Skin is not pale.   Neurological:      Mental Status: Mental status is at baseline.      Comments: Remains on mechanical ventilation. Will  track at times   Psychiatric:         Behavior: Behavior normal.         Vents:  Vent Mode: A/C (02/17/22 1118)  Set Rate: 16 BPM (02/17/22 1118)  Vt Set: 480 mL (02/17/22 1118)  Pressure Support: 15 cmH20 (02/16/22 0727)  PEEP/CPAP: 5 cmH20 (02/17/22 1118)  Oxygen Concentration (%): 40 (02/17/22 1118)  Peak Airway Pressure: 18 cmH2O (02/17/22 1118)  Total Ve: 10.8 mL (02/17/22 1118)  F/VT Ratio<105 (RSBI): (!) 79.65 (02/16/22 1953)    Lines/Drains/Airways     Peripherally Inserted Central Catheter Line            PICC Double Lumen 01/05/22 left basilic 43 days          Central Venous Catheter Line                 Hemodialysis Catheter 12/30/21 0900 right internal jugular 49 days          Drain                 NG/OG Tube [place] sump 18 Fr. Left nostril -- days         Colostomy 12/18/21 1030 Descending/sigmoid LUQ 61 days          Airway                 Surgical Airway 01/04/22 Shiley 44 days                Significant Labs:    CBC/Anemia Profile:  No results for input(s): WBC, HGB, HCT, PLT, MCV, RDW, IRON, FERRITIN, RETIC, FOLATE, [id number], OCCULTBLOOD in the last 48 hours.     Chemistries:  No results for input(s): NA, K, CL, CO2, BUN, CREATININE, CALCIUM, ALBUMIN, PROT, BILITOT, ALKPHOS, ALT, AST, GLUCOSE, MG, PHOS in the last 48 hours.    All pertinent labs within the past 24 hours have been reviewed.    Significant Imaging:  I have reviewed all pertinent imaging results/findings within the past 24 hours.

## 2022-02-17 NOTE — PROGRESS NOTES
[facility]  Wound Care  Progress Note    Patient Name: Og Garcia  MRN: [id number]  Admission Date: 12/23/2021  Hospital Length of Stay: 55 days  Attending Physician: Cecil Abernathy DO  Primary Care Provider: Hector Arndt DNP, FNP-C      Subjective:     HPI:  Og Garcia is a a 66 y.o. 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 During removal of wound vac today, wound bleed profusely and required application of surgicel to stop bleeding.     Scheduled Meds:   calcium gluconate IVPB  1 g Intravenous Once    diltiaZEM  60 mg Per NG tube Q6H    fentaNYL  1 patch Transdermal Q72H    insulin aspart U-100  1-10 Units Subcutaneous Q6H    pantoprazole  40 mg Per NG tube Daily    sevelamer carbonate  0.8 g Per NG tube TID WM    ticagrelor  90 mg Per NG tube BID     Continuous Infusions:  PRN Meds:sodium chloride 0.9%, acetaminophen, albuterol sulfate, bisacodyL, dextromethorphan-guaiFENesin  mg/5 ml, dextrose 50%, glucagon (human recombinant), heparin (porcine), lorazepam, ondansetron, silver sulfADIAZINE 1%, simethicone    Review of Systems   Unable to perform ROS: Intubated     Objective:     Vital Signs (Most Recent):  Temp: 98.4 °F (36.9 °C) (02/16/22 2000)  Pulse: 101 (02/16/22 2245)  Resp: (!) 22 (02/16/22 2245)  BP: 111/63 (02/16/22 2245)  SpO2: 100 % (02/16/22 2245) Vital Signs (24h Range):  Temp:  [98.4 °F (36.9 °C)-100.2 °F (37.9 °C)] 98.4 °F (36.9 °C)  Pulse:  [] 101  Resp:  [11-37] 22  SpO2:  [100 %] 100 %  BP: ()/(47-67) 111/63     Weight: 84.5 kg (186 lb 4.6 oz)  Body mass index is 25.98 kg/m².    Physical Exam  Vitals reviewed.   Constitutional:       General: He is in acute distress.      Appearance: He is ill-appearing.   HENT:      Head: Normocephalic.      Mouth/Throat:      Mouth: Mucous membranes are dry.   Cardiovascular:      Rate and Rhythm: Regular rhythm. Tachycardia present.      Pulses: Normal pulses.      Heart sounds: Normal heart  sounds.   Pulmonary:      Effort: Pulmonary effort is normal.      Breath sounds: Rales present.   Musculoskeletal:         General: Swelling, tenderness, deformity and signs of injury present.      Right lower leg: Edema present.      Left lower leg: Edema present.   Skin:     Capillary Refill: Capillary refill takes 2 to 3 seconds.      Findings: Erythema and rash present.      Comments: Wound     Neurological:      Mental Status: He is alert. Mental status is at baseline.      Motor: Weakness present.         Plan:   D/c wound vac to abdomen. Leave surgicel in place. Cover and secure with dry dressing.   Continue current plan of care on other wounds.     HERSON Garcia  Wound Care  [facility]

## 2022-02-17 NOTE — ASSESSMENT & PLAN NOTE
- 12/14 intubated, tracheostomy 1/4  Weaning waxes and wanes     1/27- continue to increase as tolerated. Currently doing 4 hours SIMV with reduced rate of 4. - will transition his fentanyl infusion to a patch of 50mcg today    Continue weaning from the vent  1/31- plan for 5 hours tid today. Continue to increase as tolerated  2/1- He was not able to complete trial last night. We will continue with current plan and try again today  2/2- had issues yesterday due to tachycardia and anxiety. I have added ativan prn  2/3 was not able to do any trials yesterday. I am going to rest on the ventilator today  2/4 plan to resume some trials following HD today  2/5- still having issues with weaning trials. A lot of secretions. I have added mucinex.  2/6 on a trial this am and doing ok. Will continue throughout today as tolerated  2/7- plan to keep same weaning trials today  2/8- weaning has been slow. He is doing ok today with simv with reduce rate of 2.  02/11/2022 continue weaning  2/14- plan to increase weaning today.   2/15- looks like they have been doing reduced rate with assist control. I have switched him over the SIMV during my exam with reduced rate of 2. He is doing well. We will continue with this for his breathing trials.   2/16 Increase breathing trials as able. He does occasionally have some apnea but holds his sats  2/17- Back to OR today. Will hold on increasing trials today but can resume tomorrow

## 2022-02-17 NOTE — PROGRESS NOTES
Abdominal wound has granulated nicely and currently ready for skin graft.  Patient is on Brilinta for history of heart stents and will stop that for over the weekend.  Plan to go to the OR on Monday for a split-thickness skin graft to the abdominal area.  Risks and benefits explained.  All questions were answered.

## 2022-02-17 NOTE — SUBJECTIVE & OBJECTIVE
Interval History: Condition remains the same.  No acute changes.    Review of patient's allergies indicates:  No Known Allergies  Current Facility-Administered Medications   Medication Frequency    0.9%  NaCl infusion PRN    acetaminophen tablet 500 mg Q6H PRN    albuterol nebulizer solution 2.5 mg Q4H PRN    bisacodyL EC tablet 10 mg Daily PRN    calcium gluconate 1 g in dextrose 5 % in water (D5W) 5 % 100 mL IVPB (MB+) Once    dextromethorphan-guaiFENesin  mg/5 ml liquid 10 mL Q6H PRN    dextrose 50% injection 12.5 g PRN    diltiaZEM tablet 60 mg Q6H    fentaNYL 50 mcg/hr 1 patch Q72H    glucagon (human recombinant) injection 1 mg PRN    heparin (porcine) injection 4,000 Units PRN    insulin aspart U-100 injection 1-10 Units Q6H    lorazepam injection 2 mg Q6H PRN    ondansetron injection 8 mg Q6H PRN    pantoprazole suspension 40 mg Daily    sevelamer carbonate pwpk 0.8 g TID WM    silver sulfADIAZINE 1% cream PRN    simethicone chewable tablet 80 mg TID PRN    ticagrelor tablet 90 mg BID       Objective:     Vital Signs (Most Recent):  Temp: 98 °F (36.7 °C) (02/17/22 0824)  Pulse: 105 (02/17/22 0830)  Resp: (!) 24 (02/17/22 0830)  BP: (!) 112/58 (02/17/22 0830)  SpO2: 100 % (02/17/22 0830)  O2 Device (Oxygen Therapy): ventilator (02/17/22 0800) Vital Signs (24h Range):  Temp:  [97.7 °F (36.5 °C)-100.2 °F (37.9 °C)] 98 °F (36.7 °C)  Pulse:  [] 105  Resp:  [12-37] 24  SpO2:  [100 %] 100 %  BP: ()/(47-67) 112/58     Weight: 80.8 kg (178 lb 2.1 oz) (02/17/22 0300)  Body mass index is 24.84 kg/m².  Body surface area is 2.01 meters squared.    I/O last 3 completed shifts:  In: 2145 [Other:500; NG/GT:1645]  Out: 2300 [Other:2000; Stool:300]    Physical Exam  Vitals reviewed.   HENT:      Mouth/Throat:      Mouth: Mucous membranes are dry.   Cardiovascular:      Rate and Rhythm: Regular rhythm.      Heart sounds: Normal heart sounds.   Pulmonary:      Comments: ventilated  Abdominal:       General: Abdomen is flat.      Palpations: Abdomen is soft.   Neurological:      Mental Status: He is alert.         Significant Labs:  BMP:   Recent Labs   Lab 02/14/22  0705   *      K 3.9      CO2 27   BUN 56*   CREATININE 1.55*   CALCIUM 8.0*   MG 2.2     CBC:   Recent Labs   Lab 02/14/22  0704   WBC 14.58*   RBC 2.62*   HGB 7.9*   HCT 23.7*   *   MCV 90.5   MCH 30.2   MCHC 33.3        Significant Imaging:  Labs: Reviewed

## 2022-02-17 NOTE — ASSESSMENT & PLAN NOTE
- s/p multiple debridements  - wound vac in place  - ID consulted for antibiotic management: He was treated with rocephin, daptomycin, clindamycin. 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 Amphotericin started on 1/7. The patient went to OR on 1/10 for debridement. Surgery note reviewed and appreciated.  1/12- back to OR today.  1/13- OR note reviewed. No purulence noted.    1/27- wound vac in place   2/1- seems to be improving  2/5 wound vac in place  2/9- antibiotics and amphotericin completed  2/14- no acute issues currently  2/15- plan to remove wound vac today due to some oozing of blood  2/17- patient is going back to OR today with Dr. Cazares

## 2022-02-17 NOTE — PROGRESS NOTES
[facility]  Nephrology  Progress Note    Patient Name: Og Garcia  MRN: [id number]  Admission Date: 12/23/2021  Hospital Length of Stay: 56 days  Attending Provider: Cecil Abernathy DO   Primary Care Physician: Hector Arndt DNP, FNP-C  Principal Problem:Denice gangrene    Subjective:     HPI: The patient is known from the previous nephrology consult at [facility].  He is no at [facility] and continues to need dialysis support.      Interval History: Condition remains the same.  No acute changes.    Review of patient's allergies indicates:  No Known Allergies  Current Facility-Administered Medications   Medication Frequency    0.9%  NaCl infusion PRN    acetaminophen tablet 500 mg Q6H PRN    albuterol nebulizer solution 2.5 mg Q4H PRN    bisacodyL EC tablet 10 mg Daily PRN    calcium gluconate 1 g in dextrose 5 % in water (D5W) 5 % 100 mL IVPB (MB+) Once    dextromethorphan-guaiFENesin  mg/5 ml liquid 10 mL Q6H PRN    dextrose 50% injection 12.5 g PRN    diltiaZEM tablet 60 mg Q6H    fentaNYL 50 mcg/hr 1 patch Q72H    glucagon (human recombinant) injection 1 mg PRN    heparin (porcine) injection 4,000 Units PRN    insulin aspart U-100 injection 1-10 Units Q6H    lorazepam injection 2 mg Q6H PRN    ondansetron injection 8 mg Q6H PRN    pantoprazole suspension 40 mg Daily    sevelamer carbonate pwpk 0.8 g TID WM    silver sulfADIAZINE 1% cream PRN    simethicone chewable tablet 80 mg TID PRN    ticagrelor tablet 90 mg BID       Objective:     Vital Signs (Most Recent):  Temp: 98 °F (36.7 °C) (02/17/22 0824)  Pulse: 105 (02/17/22 0830)  Resp: (!) 24 (02/17/22 0830)  BP: (!) 112/58 (02/17/22 0830)  SpO2: 100 % (02/17/22 0830)  O2 Device (Oxygen Therapy): ventilator (02/17/22 0800) Vital Signs (24h Range):  Temp:  [97.7 °F (36.5 °C)-100.2 °F (37.9 °C)] 98 °F (36.7 °C)  Pulse:  [] 105  Resp:  [12-37] 24  SpO2:  [100 %] 100 %  BP: ()/(47-67) 112/58      Weight: 80.8 kg (178 lb 2.1 oz) (02/17/22 0300)  Body mass index is 24.84 kg/m².  Body surface area is 2.01 meters squared.    I/O last 3 completed shifts:  In: 2145 [Other:500; NG/GT:1645]  Out: 2300 [Other:2000; Stool:300]    Physical Exam  Vitals reviewed.   HENT:      Mouth/Throat:      Mouth: Mucous membranes are dry.   Cardiovascular:      Rate and Rhythm: Regular rhythm.      Heart sounds: Normal heart sounds.   Pulmonary:      Comments: ventilated  Abdominal:      General: Abdomen is flat.      Palpations: Abdomen is soft.   Neurological:      Mental Status: He is alert.         Significant Labs:  BMP:   Recent Labs   Lab 02/14/22  0705   *      K 3.9      CO2 27   BUN 56*   CREATININE 1.55*   CALCIUM 8.0*   MG 2.2     CBC:   Recent Labs   Lab 02/14/22  0704   WBC 14.58*   RBC 2.62*   HGB 7.9*   HCT 23.7*   *   MCV 90.5   MCH 30.2   MCHC 33.3        Significant Imaging:  Labs: Reviewed    Assessment/Plan:     RAHAT (acute kidney injury)  Dialyzed yesterday  1/31/2022 Continue with scheduled hemodialysis.  2/1/2022 Continue with dialysis for this patient.  2/2/2022 Dialysis sessions are going well.  2/3/2022 Dialysis as scheduled for this patient.  2/4/2022 The patient is doing well with dialysis.  Metabolics are stable.  2/8/2022 Continue with dialysis for this patient.  2/14/2022 Continue with scheduled hemodialysis for this patient.  2/15/2022 Dialysis as scheduled for this patient.  2/16/2022 Continue dialysis.  2/17/2022 The condition is the same.          Thank you for your consult. I will follow-up with patient. Please contact us if you have any additional questions.    Edwin Pryor Jr, MD  Nephrology  [facility]

## 2022-02-18 NOTE — PROGRESS NOTES
[facility]  Pulmonology  Progress Note    Patient Name: Og Garcia  MRN: [id number]  Admission Date: 12/23/2021  Hospital Length of Stay: 57 days  Code Status: Prior  Attending Provider: Cecil Abernathy DO  Primary Care Provider: Hector Arndt DNP, FNP-C   Principal Problem: Denice gangrene    Subjective:     Interval History:  More alert than last time I saw him    Objective:     Vital Signs (Most Recent):  Temp: 99.4 °F (37.4 °C) (02/18/22 0300)  Pulse: 94 (02/18/22 0400)  Resp: 16 (02/18/22 0400)  BP: 117/61 (02/18/22 0620)  SpO2: 100 % (02/18/22 0400) Vital Signs (24h Range):  Temp:  [98 °F (36.7 °C)-99.8 °F (37.7 °C)] 99.4 °F (37.4 °C)  Pulse:  [] 94  Resp:  [10-29] 16  SpO2:  [99 %-100 %] 100 %  BP: ()/(53-74) 117/61     Weight: 80.8 kg (178 lb 2.1 oz)  Body mass index is 24.84 kg/m².      Intake/Output Summary (Last 24 hours) at 2/18/2022 0706  Last data filed at 2/17/2022 1500  Gross per 24 hour   Intake 544 ml   Output 250 ml   Net 294 ml       Physical Exam  Vitals reviewed.   Constitutional:       Appearance: Normal appearance.      Interventions: He is not intubated.  HENT:      Head: Normocephalic and atraumatic.      Nose: Nose normal.      Mouth/Throat:      Mouth: Mucous membranes are dry.      Pharynx: Oropharynx is clear.   Eyes:      Extraocular Movements: Extraocular movements intact.      Conjunctiva/sclera: Conjunctivae normal.      Pupils: Pupils are equal, round, and reactive to light.   Cardiovascular:      Rate and Rhythm: Normal rate.      Heart sounds: Normal heart sounds. No murmur heard.      Pulmonary:      Effort: Pulmonary effort is normal. He is not intubated.      Breath sounds: Normal breath sounds.   Abdominal:      General: Abdomen is flat. Bowel sounds are normal.      Palpations: Abdomen is soft.   Musculoskeletal:         General: Normal range of motion.      Cervical back: Normal range of motion and neck supple.      Right  lower leg: No edema.      Left lower leg: No edema.   Skin:     General: Skin is warm and dry.      Capillary Refill: Capillary refill takes less than 2 seconds.   Neurological:      General: No focal deficit present.      Mental Status: He is alert and oriented to person, place, and time.   Psychiatric:         Mood and Affect: Mood normal.         Behavior: Behavior normal.         Vents:  Vent Mode: A/C (02/18/22 0338)  Set Rate: 16 BPM (02/18/22 0338)  Vt Set: 480 mL (02/18/22 0338)  Pressure Support: 15 cmH20 (02/17/22 1438)  PEEP/CPAP: 5 cmH20 (02/18/22 0338)  Oxygen Concentration (%): 40 (02/18/22 0400)  Peak Airway Pressure: 18 cmH2O (02/18/22 0338)  Total Ve: 5.7 mL (02/18/22 0338)  F/VT Ratio<105 (RSBI): (!) 59.7 (02/18/22 0338)    Lines/Drains/Airways     Peripherally Inserted Central Catheter Line            PICC Double Lumen 01/05/22 left basilic 44 days          Central Venous Catheter Line                 Hemodialysis Catheter 12/30/21 0900 right internal jugular 49 days          Drain                 NG/OG Tube Ziebach sump 18 Fr. Left nostril -- days         Colostomy 12/18/21 1030 Descending/sigmoid LUQ 61 days          Airway                 Surgical Airway 01/04/22 Shiley 45 days                Significant Labs:    CBC/Anemia Profile:  No results for input(s): WBC, HGB, HCT, PLT, MCV, RDW, IRON, FERRITIN, RETIC, FOLATE, [id number], OCCULTBLOOD in the last 48 hours.     Chemistries:  No results for input(s): NA, K, CL, CO2, BUN, CREATININE, CALCIUM, ALBUMIN, PROT, BILITOT, ALKPHOS, ALT, AST, GLUCOSE, MG, PHOS in the last 48 hours.    All pertinent labs within the past 24 hours have been reviewed.    Significant Imaging:  I have reviewed all pertinent imaging results/findings within the past 24 hours.    Assessment/Plan:     * Denice gangrene  - s/p multiple debridements  - wound vac in place  - ID consulted for antibiotic management: He was treated with rocephin, daptomycin, clindamycin. 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 Amphotericin started on 1/7. The patient went to OR on 1/10 for debridement. Surgery note reviewed and appreciated.  1/12- back to OR today.  1/13- OR note reviewed. No purulence noted.    1/27- wound vac in place   2/1- seems to be improving  2/5 wound vac in place  2/9- antibiotics and amphotericin completed  2/14- no acute issues currently  2/15- plan to remove wound vac today due to some oozing of blood  2/17- patient is going back to OR today with Dr. Cazares      On mechanically assisted ventilation  - 12/14 intubated, tracheostomy 1/4  Weaning waxes and wanes     1/27- continue to increase as tolerated. Currently doing 4 hours SIMV with reduced rate of 4. - will transition his fentanyl infusion to a patch of 50mcg today    Continue weaning from the vent  1/31- plan for 5 hours tid today. Continue to increase as tolerated  2/1- He was not able to complete trial last night. We will continue with current plan and try again today  2/2- had issues yesterday due to tachycardia and anxiety. I have added ativan prn  2/3 was not able to do any trials yesterday. I am going to rest on the ventilator today  2/4 plan to resume some trials following HD today  2/5- still having issues with weaning trials. A lot of secretions. I have added mucinex.  2/6 on a trial this am and doing ok. Will continue throughout today as tolerated  2/7- plan to keep same weaning trials today  2/8- weaning has been slow. He is doing ok today with simv with reduce rate of 2.  02/11/2022 continue  weaning  2/14- plan to increase weaning today.   2/15- looks like they have been doing reduced rate with assist control. I have switched him over the SIMV during my exam with reduced rate of 2. He is doing well. We will continue with this for his breathing trials.   2/16 Increase breathing trials as able. He does occasionally have some apnea but holds his sats  Increase weaning trials    RAHAT (acute kidney injury)  - tunneled HD line placement 12/30  Continues with dialysis per nephrology              Hypertension  Currently hypotensive. Holding any antihypertensives  BP looks a little better this am. Weaning pressor as tolerated  1/21- No longer on pressor.   1/31- hemodynamically stable  Blood pressure looks good    Type 2 diabetes mellitus without complication, without long-term current use of insulin  - continue basal/bolus insulin  -accuchecks in last 24 hours look good      Acute blood loss anemia  - H/H down to 5.5/16 - pt had bleeding from his HD insertion site yesterday this has now resolved.   - will transfuse 2 units PRBCs on HD  1/2- 9.7/27.5 - repeat CBC in AM  1/4- Hgb is 7.2. Not sure if he will be transfused during surgery. IF not we will try to get  Him transfused with next HD  01/06/2021 transfuse 1 unit of packed red blood cells today  01/10/2021 needs a unit of blood  1/11- repeat H&H in am. Continue to transfuse as needed.  1/13- Hgb is 5. Transfusing today. Post transfusion H&H  1/14- CBC pending for today  1/15- 6/18 - hemodynamically stable, recheck in AM   1/16-- 6.1/17.1 - critical low blood shortage in hospital - holding transfusion until next HD since he is hemodynamically stable and not actively bleeding   01/17/2021 day transfuse blood on dialysis today  01/18/2020 to hemoglobin up to 9 after transfusion  01/25/2022 no signs of bleeding most recent hemoglobin 8  01/29/2022 patient will need transfusion today  1/31- Hgb 6.7 yesterday. Can transfuse with next HD session  2/2- H&H is  pending  2/3- plan to transfuse 1 unit of PRBC's  2/4 Hgb is 6.9 today. We will transfuse another unit today  2/7 8.4 now  2/14- 7.9 today  2/15- oozing some today. We are going to hold heparin today. Continue to monitor H&H and transfuse as needed  INR 1.22. Oozing has improved                 Jose Urban MD  Pulmonology  [facility]

## 2022-02-18 NOTE — PLAN OF CARE
Problem: Communication Impairment (Mechanical Ventilation, Invasive)  Goal: Effective Communication  Outcome: Ongoing, Progressing     Problem: Device-Related Complication Risk (Mechanical Ventilation, Invasive)  Goal: Optimal Device Function  Outcome: Ongoing, Progressing     Problem: Inability to Wean (Mechanical Ventilation, Invasive)  Goal: Mechanical Ventilation Liberation  Outcome: Ongoing, Progressing     Problem: Nutrition Impairment (Mechanical Ventilation, Invasive)  Goal: Optimal Nutrition Delivery  Outcome: Ongoing, Progressing     Problem: Skin and Tissue Injury (Mechanical Ventilation, Invasive)  Goal: Absence of Device-Related Skin and Tissue Injury  Outcome: Ongoing, Progressing     Problem: Ventilator-Induced Lung Injury (Mechanical Ventilation, Invasive)  Goal: Absence of Ventilator-Induced Lung Injury  Outcome: Ongoing, Progressing     Problem: Communication Impairment (Artificial Airway)  Goal: Effective Communication  Outcome: Ongoing, Progressing     Problem: Device-Related Complication Risk (Artificial Airway)  Goal: Optimal Device Function  Outcome: Ongoing, Progressing     Problem: Skin and Tissue Injury (Artificial Airway)  Goal: Absence of Device-Related Skin or Tissue Injury  Outcome: Ongoing, Progressing     Problem: Noninvasive Ventilation Acute  Goal: Effective Unassisted Ventilation and Oxygenation  Outcome: Ongoing, Progressing     Problem: Skin Injury Risk Increased  Goal: Skin Health and Integrity  Outcome: Ongoing, Progressing     Problem: Gas Exchange Impaired  Goal: Optimal Gas Exchange  Outcome: Ongoing, Progressing

## 2022-02-18 NOTE — SUBJECTIVE & OBJECTIVE
Interval History:  More alert than last time I saw him    Objective:     Vital Signs (Most Recent):  Temp: 99.4 °F (37.4 °C) (02/18/22 0300)  Pulse: 94 (02/18/22 0400)  Resp: 16 (02/18/22 0400)  BP: 117/61 (02/18/22 0620)  SpO2: 100 % (02/18/22 0400) Vital Signs (24h Range):  Temp:  [98 °F (36.7 °C)-99.8 °F (37.7 °C)] 99.4 °F (37.4 °C)  Pulse:  [] 94  Resp:  [10-29] 16  SpO2:  [99 %-100 %] 100 %  BP: ()/(53-74) 117/61     Weight: 80.8 kg (178 lb 2.1 oz)  Body mass index is 24.84 kg/m².      Intake/Output Summary (Last 24 hours) at 2/18/2022 0706  Last data filed at 2/17/2022 1500  Gross per 24 hour   Intake 544 ml   Output 250 ml   Net 294 ml       Physical Exam  Vitals reviewed.   Constitutional:       Appearance: Normal appearance.      Interventions: He is not intubated.  HENT:      Head: Normocephalic and atraumatic.      Nose: Nose normal.      Mouth/Throat:      Mouth: Mucous membranes are dry.      Pharynx: Oropharynx is clear.   Eyes:      Extraocular Movements: Extraocular movements intact.      Conjunctiva/sclera: Conjunctivae normal.      Pupils: Pupils are equal, round, and reactive to light.   Cardiovascular:      Rate and Rhythm: Normal rate.      Heart sounds: Normal heart sounds. No murmur heard.      Pulmonary:      Effort: Pulmonary effort is normal. He is not intubated.      Breath sounds: Normal breath sounds.   Abdominal:      General: Abdomen is flat. Bowel sounds are normal.      Palpations: Abdomen is soft.   Musculoskeletal:         General: Normal range of motion.      Cervical back: Normal range of motion and neck supple.      Right lower leg: No edema.      Left lower leg: No edema.   Skin:     General: Skin is warm and dry.      Capillary Refill: Capillary refill takes less than 2 seconds.   Neurological:      General: No focal deficit present.      Mental Status: He is alert and oriented to person, place, and time.   Psychiatric:         Mood and Affect: Mood normal.          Behavior: Behavior normal.         Vents:  Vent Mode: A/C (02/18/22 0338)  Set Rate: 16 BPM (02/18/22 0338)  Vt Set: 480 mL (02/18/22 0338)  Pressure Support: 15 cmH20 (02/17/22 1438)  PEEP/CPAP: 5 cmH20 (02/18/22 0338)  Oxygen Concentration (%): 40 (02/18/22 0400)  Peak Airway Pressure: 18 cmH2O (02/18/22 0338)  Total Ve: 5.7 mL (02/18/22 0338)  F/VT Ratio<105 (RSBI): (!) 59.7 (02/18/22 0338)    Lines/Drains/Airways     Peripherally Inserted Central Catheter Line            PICC Double Lumen 01/05/22 left basilic 44 days          Central Venous Catheter Line                 Hemodialysis Catheter 12/30/21 0900 right internal jugular 49 days          Drain                 NG/OG Tube [place] sump 18 Fr. Left nostril -- days         Colostomy 12/18/21 1030 Descending/sigmoid LUQ 61 days          Airway                 Surgical Airway 01/04/22 Shiley 45 days                Significant Labs:    CBC/Anemia Profile:  No results for input(s): WBC, HGB, HCT, PLT, MCV, RDW, IRON, FERRITIN, RETIC, FOLATE, [id number], OCCULTBLOOD in the last 48 hours.     Chemistries:  No results for input(s): NA, K, CL, CO2, BUN, CREATININE, CALCIUM, ALBUMIN, PROT, BILITOT, ALKPHOS, ALT, AST, GLUCOSE, MG, PHOS in the last 48 hours.    All pertinent labs within the past 24 hours have been reviewed.    Significant Imaging:  I have reviewed all pertinent imaging results/findings within the past 24 hours.

## 2022-02-18 NOTE — PROGRESS NOTES
[facility]  Nephrology  Progress Note    Patient Name: Og Garcia  MRN: [id number]  Admission Date: 12/23/2021  Hospital Length of Stay: 57 days  Attending Provider: Cecil Abernathy DO   Primary Care Physician: Hector Arndt DNP, FNP-C  Principal Problem:Denice gangrene    Subjective:     HPI: The patient is known from the previous nephrology consult at [facility].  He is no at Specialty and continues to need dialysis support.      Interval History: Condition is the same.    Review of patient's allergies indicates:  No Known Allergies  Current Facility-Administered Medications   Medication Frequency    0.9%  NaCl infusion PRN    acetaminophen tablet 500 mg Q6H PRN    albuterol nebulizer solution 2.5 mg Q4H PRN    bisacodyL EC tablet 10 mg Daily PRN    calcium gluconate 1 g in dextrose 5 % in water (D5W) 5 % 100 mL IVPB (MB+) Once    dextromethorphan-guaiFENesin  mg/5 ml liquid 10 mL Q6H PRN    dextrose 50% injection 12.5 g PRN    diltiaZEM tablet 60 mg Q6H    fentaNYL 50 mcg/hr 1 patch Q72H    glucagon (human recombinant) injection 1 mg PRN    heparin (porcine) injection 4,000 Units PRN    insulin aspart U-100 injection 1-10 Units Q6H    lorazepam injection 2 mg Q6H PRN    ondansetron injection 8 mg Q6H PRN    pantoprazole suspension 40 mg Daily    sevelamer carbonate pwpk 0.8 g TID WM    silver sulfADIAZINE 1% cream PRN    simethicone chewable tablet 80 mg TID PRN       Objective:     Vital Signs (Most Recent):  Temp: 98.3 °F (36.8 °C) (02/18/22 0700)  Pulse: 98 (02/18/22 0810)  Resp: (!) 21 (02/18/22 0810)  BP: (!) 102/55 (02/18/22 0700)  SpO2: 100 % (02/18/22 0810)  O2 Device (Oxygen Therapy): ventilator (02/18/22 0810) Vital Signs (24h Range):  Temp:  [98.3 °F (36.8 °C)-99.8 °F (37.7 °C)] 98.3 °F (36.8 °C)  Pulse:  [] 98  Resp:  [10-29] 21  SpO2:  [99 %-100 %] 100 %  BP: ()/(53-74) 102/55     Weight: 83 kg (182 lb 15.7 oz) (02/18/22 0630)  Body  mass index is 25.52 kg/m².  Body surface area is 2.04 meters squared.    I/O last 3 completed shifts:  In: 1596 [NG/GT:1596]  Out: 500 [Stool:500]    Physical Exam  Vitals reviewed.   HENT:      Mouth/Throat:      Mouth: Mucous membranes are dry.   Cardiovascular:      Rate and Rhythm: Regular rhythm.      Heart sounds: Normal heart sounds.   Pulmonary:      Effort: Pulmonary effort is normal.   Abdominal:      Palpations: Abdomen is soft.   Neurological:      Mental Status: He is alert.         Significant Labs:  BMP:   Recent Labs   Lab 02/14/22  0705   *      K 3.9      CO2 27   BUN 56*   CREATININE 1.55*   CALCIUM 8.0*   MG 2.2     CBC:   Recent Labs   Lab 02/14/22  0704   WBC 14.58*   RBC 2.62*   HGB 7.9*   HCT 23.7*   *   MCV 90.5   MCH 30.2   MCHC 33.3        Significant Imaging:  Labs: Reviewed    Assessment/Plan:     RAHAT (acute kidney injury)  Dialyzed yesterday  1/31/2022 Continue with scheduled hemodialysis.  2/1/2022 Continue with dialysis for this patient.  2/2/2022 Dialysis sessions are going well.  2/3/2022 Dialysis as scheduled for this patient.  2/4/2022 The patient is doing well with dialysis.  Metabolics are stable.  2/8/2022 Continue with dialysis for this patient.  2/14/2022 Continue with scheduled hemodialysis for this patient.  2/15/2022 Dialysis as scheduled for this patient.  2/16/2022 Continue dialysis.  2/17/2022 The condition is the same.    2/18/2022 Continue with dialysis for this patient.        Thank you for your consult. I will follow-up with patient. Please contact us if you have any additional questions.    Edwin Pryor Jr, MD  Nephrology  [facility]

## 2022-02-18 NOTE — PROGRESS NOTES
[facility]  Adult Nutrition  Follow-up Note         Reason for Assessment  Reason For Assessment: RD follow-up  Nutrition Risk Screen: tube feeding or parenteral nutrition  Malnutrition  Is Patient Malnourished: No  Nutrition Diagnosis  Increased protein Needs   related to Decreased/ impaired skin integrity as evidenced by wounds    Nutrition Diagnosis Status: Improving      Nutrition Risk  Level of Risk/Frequency of Follow-up: high   Chewing or Swallowing Difficulty?: No Chewing or swallowing difficulty  Estimated/Assessed Needs  RMR ([place]-St. Jeor Equation): 1632.13 Activity Factor: 1 Injury Factor: 1.2   Total Ve: 6.8 mL Temp: 98.5 °F (36.9 °C)Axillary  Weight Used For Calorie Calculations: 83 kg (182 lb 15.7 oz)   Energy Need Method: [facility] Energy Calorie Requirements (kcal): 1971  Weight Used For Protein Calculations: 86.8 kg (191 lb 5.8 oz)  Protein Requirements: 104-122  Estimated Fluid Requirement Method: RDA Method Fluid Requirements (mL): 1971  RDA Method (mL): 1971     Nutrition Prescription / Recommendations  Recommendation/Intervention: Tube feeding: Vital AF 75ml/hr; H2O flush of 100ml q 4hr  Goals: pt will meet estimated nutritional needs; wound healing, TF tolerance  Nutrition Goal Status: progressing towards goal  Communication of RD Recs: reviewed with physician  Current Diet Order: Tube feeding: Vital AF 75ml/hr; H2O flush of 100ml q 4hr  Nutrition Order Comments: Tube feeding: Vital AF 75ml/hr; H2O flush of 100ml q 4hr  Current Nutrition Support Formula Ordered: Vital AF 1.2  Current Nutrition Support Rate Ordered: 75 (ml)  Current Nutrition Support Frequency Ordered: hourly  Recommended Diet: Enteral Nutrition Tube feeding: Vital AF 75ml/hr; H2O flush of 100ml q 4hr  Recommended Oral Supplement: No Oral Supplements  Is Nutrition Support Recommended: No  Is Education Recommended: No  Monitor and Evaluation  % current Intake: Enteral Nutrition at goal  % intake to meet  estimated needs: Enteral Nutrition   Food and Nutrient Intake: enteral nutrition intake  Food and Nutrient Adminstration: enteral and parenteral nutrition administration  Anthropometric Measurements: height/length,body mass index,weight,weight change  Biochemical Data, Medical Tests and Procedures: electrolyte and renal panel,glucose/endocrine profile  Nutrition-Focused Physical Findings: skin  Enteral Calories (kcal): 2160  Enteral Protein (gm): 135  Enteral (Free Water) Fluid (mL): 1458  Free Water Flush Fluid (mL): 300  Parenteral Calories (kcal): 845  Parenteral Protein (gm): 48  Parenteral Fluid (mL): 960  Lipid Calories (kcals): 500 kcals  Other Calories (kcal): 528 (propofol)  Total Calories (kcal): 2160  Total Calories (kcal/kg): 2612  % Kcal Needs: 100  Total Protein (gm): 135  % Protein Needs: 100  IV Fluid (mL): 1764  Total Fluid Intake (mL): 1758  Energy Calories Required: meeting needs  Protein Required: meeting needs  Fluid Required: meeting needs  Tolerance: tolerating  Current Medical Diagnosis and Past Medical History  Diagnosis: gastrointestinal disease,infection/sepsis  Past Medical History:   Diagnosis Date    Hyperlipidemia     Hypertension      Nutrition/Diet History  Spiritual, Cultural Beliefs, Alevism Practices, Values that Affect Care: no  Food Allergies: NKFA  Factors Affecting Nutritional Intake: None identified at this time  Lab/Procedures/Meds  No results for input(s): NA, K, BUN, CREATININE, GLU, CALCIUM, ALBUMIN, CL, ALT, AST, PHOS in the last 72 hours.  Last A1c: No results found for: HGBA1C  Lab Results   Component Value Date    RBC 2.62 (L) 02/14/2022    HGB 7.9 (L) 02/14/2022    HCT 23.7 (L) 02/14/2022    MCV 90.5 02/14/2022    MCH 30.2 02/14/2022    MCHC 33.3 02/14/2022     Pertinent Labs Reviewed: reviewed  Pertinent Labs Comments: Hct 23.7,BUN 56, Cr 1.55, Glu 138, Alb 1.3  Pertinent Medications Reviewed: reviewed  Pertinent Medications Comments: heparin,  "insulin  Anthropometrics  Temp: 98.5 °F (36.9 °C)  Height Method: Stated  Height: 5' 11" (180.3 cm)  Height (inches): 71 in  Weight Method: Bed Scale  Weight: 83 kg (182 lb 15.7 oz)  Weight (lb): 182.98 lb  Ideal Body Weight (IBW), Male: 172 lb  % Ideal Body Weight, Male (lb): 111 %  BMI (Calculated): 25.5  BMI Grade: 25 - 29.9 - overweight     Nutrition by Nursing  Diet/Nutrition Received: tube feeding     Diet/Feeding Assistance: none  Diet/Feeding Tolerance: good  Last Bowel Movement: 02/18/22 (colostomy)       NG/OG Tube [place] sump 18 Fr. Left nostril-Feeding Type: continuous,by pump       NG/OG Tube [place] sump 18 Fr. Left nostril-Current Rate (mL/hr): 75 mL/hr       NG/OG Tube [place] sump 18 Fr. Left nostril-Goal Rate (mL/hr): 75 mL/hr       NG/OG Tube [place] sump 18 Fr. Left nostril-Formula Name: Vital AF  Nutrition Follow-Up  RD Follow-up?: Yes  Assessment and Plan  No new Assessment & Plan notes have been filed under this hospital service since the last note was generated.  Service: Nutrition     "

## 2022-02-18 NOTE — NURSING
Rec'd lying in bed, eyes closed, resp even, NADN, trach to vent in simv mode 2 480 15 5 40% , fentanyl patch intact GISELLE, RSC HD cath dsg dry/intact, Vital AF @ 75cc/hr infusing to (L) NGT, (L) colostomy draining brown stool, all dsg dry/intact, sfaety measures on going, bed down, cb near

## 2022-02-18 NOTE — ASSESSMENT & PLAN NOTE
- 12/14 intubated, tracheostomy 1/4  Weaning waxes and wanes     1/27- continue to increase as tolerated. Currently doing 4 hours SIMV with reduced rate of 4. - will transition his fentanyl infusion to a patch of 50mcg today    Continue weaning from the vent  1/31- plan for 5 hours tid today. Continue to increase as tolerated  2/1- He was not able to complete trial last night. We will continue with current plan and try again today  2/2- had issues yesterday due to tachycardia and anxiety. I have added ativan prn  2/3 was not able to do any trials yesterday. I am going to rest on the ventilator today  2/4 plan to resume some trials following HD today  2/5- still having issues with weaning trials. A lot of secretions. I have added mucinex.  2/6 on a trial this am and doing ok. Will continue throughout today as tolerated  2/7- plan to keep same weaning trials today  2/8- weaning has been slow. He is doing ok today with simv with reduce rate of 2.  02/11/2022 continue weaning  2/14- plan to increase weaning today.   2/15- looks like they have been doing reduced rate with assist control. I have switched him over the SIMV during my exam with reduced rate of 2. He is doing well. We will continue with this for his breathing trials.   2/16 Increase breathing trials as able. He does occasionally have some apnea but holds his sats  Increase weaning trials

## 2022-02-18 NOTE — SUBJECTIVE & OBJECTIVE
Interval History: Condition is the same.    Review of patient's allergies indicates:  No Known Allergies  Current Facility-Administered Medications   Medication Frequency    0.9%  NaCl infusion PRN    acetaminophen tablet 500 mg Q6H PRN    albuterol nebulizer solution 2.5 mg Q4H PRN    bisacodyL EC tablet 10 mg Daily PRN    calcium gluconate 1 g in dextrose 5 % in water (D5W) 5 % 100 mL IVPB (MB+) Once    dextromethorphan-guaiFENesin  mg/5 ml liquid 10 mL Q6H PRN    dextrose 50% injection 12.5 g PRN    diltiaZEM tablet 60 mg Q6H    fentaNYL 50 mcg/hr 1 patch Q72H    glucagon (human recombinant) injection 1 mg PRN    heparin (porcine) injection 4,000 Units PRN    insulin aspart U-100 injection 1-10 Units Q6H    lorazepam injection 2 mg Q6H PRN    ondansetron injection 8 mg Q6H PRN    pantoprazole suspension 40 mg Daily    sevelamer carbonate pwpk 0.8 g TID WM    silver sulfADIAZINE 1% cream PRN    simethicone chewable tablet 80 mg TID PRN       Objective:     Vital Signs (Most Recent):  Temp: 98.3 °F (36.8 °C) (02/18/22 0700)  Pulse: 98 (02/18/22 0810)  Resp: (!) 21 (02/18/22 0810)  BP: (!) 102/55 (02/18/22 0700)  SpO2: 100 % (02/18/22 0810)  O2 Device (Oxygen Therapy): ventilator (02/18/22 0810) Vital Signs (24h Range):  Temp:  [98.3 °F (36.8 °C)-99.8 °F (37.7 °C)] 98.3 °F (36.8 °C)  Pulse:  [] 98  Resp:  [10-29] 21  SpO2:  [99 %-100 %] 100 %  BP: ()/(53-74) 102/55     Weight: 83 kg (182 lb 15.7 oz) (02/18/22 0630)  Body mass index is 25.52 kg/m².  Body surface area is 2.04 meters squared.    I/O last 3 completed shifts:  In: 1596 [NG/GT:1596]  Out: 500 [Stool:500]    Physical Exam  Vitals reviewed.   HENT:      Mouth/Throat:      Mouth: Mucous membranes are dry.   Cardiovascular:      Rate and Rhythm: Regular rhythm.      Heart sounds: Normal heart sounds.   Pulmonary:      Effort: Pulmonary effort is normal.   Abdominal:      Palpations: Abdomen is soft.   Neurological:       Mental Status: He is alert.         Significant Labs:  BMP:   Recent Labs   Lab 02/14/22  0705   *      K 3.9      CO2 27   BUN 56*   CREATININE 1.55*   CALCIUM 8.0*   MG 2.2     CBC:   Recent Labs   Lab 02/14/22  0704   WBC 14.58*   RBC 2.62*   HGB 7.9*   HCT 23.7*   *   MCV 90.5   MCH 30.2   MCHC 33.3        Significant Imaging:  Labs: Reviewed

## 2022-02-18 NOTE — PLAN OF CARE
Per IDT meeting continue plan of care. Pt remains on a vent with CPAP trials. SS following for discharge needs.

## 2022-02-18 NOTE — ASSESSMENT & PLAN NOTE
Dialyzed yesterday  1/31/2022 Continue with scheduled hemodialysis.  2/1/2022 Continue with dialysis for this patient.  2/2/2022 Dialysis sessions are going well.  2/3/2022 Dialysis as scheduled for this patient.  2/4/2022 The patient is doing well with dialysis.  Metabolics are stable.  2/8/2022 Continue with dialysis for this patient.  2/14/2022 Continue with scheduled hemodialysis for this patient.  2/15/2022 Dialysis as scheduled for this patient.  2/16/2022 Continue dialysis.  2/17/2022 The condition is the same.    2/18/2022 Continue with dialysis for this patient.

## 2022-02-18 NOTE — PROGRESS NOTES
08:15 started weaning trial SIMV 2, PS 15, FiO2 35%+5. Patient is tolerating well at this time: HR 98, RR 21, O2 100, /55. If all goes well, patient will remain on trial for 6 hours. Will continue to monitor.

## 2022-02-18 NOTE — NURSING
Patient scheduled for surgical procedure this morning. Dr Cazares notified that patient receiving Ticagrelor (Brilinta) daily- started on 01/08/2022.

## 2022-02-19 NOTE — RESPIRATORY THERAPY
Pt. Tolerated RR of 2 SIMV trial well for approx. 6 hours. Pt. Placed back on previous vent settings at this time.

## 2022-02-19 NOTE — SUBJECTIVE & OBJECTIVE
Interval History: alert but does not follow commands for me today.     Objective:     Vital Signs (Most Recent):  Temp: 99.8 °F (37.7 °C) (02/19/22 0800)  Pulse: 99 (02/19/22 0800)  Resp: (!) 26 (02/19/22 0800)  BP: (!) 127/56 (02/19/22 0800)  SpO2: 100 % (02/19/22 0800)   Vital Signs (24h Range):  Temp:  [98.5 °F (36.9 °C)-100 °F (37.8 °C)] 99.8 °F (37.7 °C)  Pulse:  [] 99  Resp:  [10-32] 26  SpO2:  [100 %] 100 %  BP: (102-140)/(51-69) 127/56     Weight: 80.9 kg (178 lb 5.6 oz)  Body mass index is 24.88 kg/m².      Intake/Output Summary (Last 24 hours) at 2/19/2022 1038  Last data filed at 2/19/2022 0600  Gross per 24 hour   Intake 1515 ml   Output 1200 ml   Net 315 ml       Physical Exam  Vitals reviewed.   Constitutional:       General: He is not in acute distress.  HENT:      Head:      Comments: Right temporal deformity     Nose: Nose normal.      Mouth/Throat:      Mouth: Mucous membranes are moist.   Eyes:      Comments: enucleated right eye   Neck:      Comments: Tracheostomy in place  Cardiovascular:      Rate and Rhythm: Normal rate and regular rhythm.      Pulses: Normal pulses.      Heart sounds: Normal heart sounds.   Pulmonary:      Effort: No respiratory distress.      Breath sounds: No stridor. No wheezing, rhonchi or rales.      Comments: Ventilator driven breath sounds bilaterally. Coarse  Abdominal:      Palpations: Abdomen is soft.      Tenderness: There is no guarding.   Genitourinary:     Comments: Packing in place  Musculoskeletal:         General: No swelling or deformity.      Cervical back: Neck supple.      Right lower leg: No edema.      Left lower leg: No edema.   Lymphadenopathy:      Cervical: No cervical adenopathy.   Skin:     General: Skin is warm and dry.   Neurological:      Mental Status: He is alert.      Cranial Nerves: No cranial nerve deficit.      Motor: Weakness present.       Vents:  Vent Mode: A/C (02/19/22 0252)  Set Rate: 2 BPM (02/19/22 0800)  Vt Set: 480 mL  (02/19/22 0800)  Pressure Support: 15 cmH20 (02/19/22 0800)  PEEP/CPAP: 5 cmH20 (02/19/22 0800)  Oxygen Concentration (%): 40 (02/19/22 0800)  Peak Airway Pressure: 21 cmH2O (02/19/22 0800)  Total Ve: 8.5 mL (02/19/22 0800)  F/VT Ratio<105 (RSBI): (!) 46.18 (02/19/22 0800)    Lines/Drains/Airways       Peripherally Inserted Central Catheter Line  Duration             PICC Double Lumen 01/05/22 left basilic 45 days              Central Venous Catheter Line  Duration                  Hemodialysis Catheter 12/30/21 0900 right internal jugular 51 days              Drain  Duration                  NG/OG Tube [place] sump 18 Fr. Left nostril -- days         Colostomy 12/18/21 1030 Descending/sigmoid LUQ 63 days              Airway  Duration                  Surgical Airway 01/04/22 [place] 46 days                    Significant Labs:    CBC/Anemia Profile:  No results for input(s): WBC, HGB, HCT, PLT, MCV, RDW, IRON, FERRITIN, RETIC, FOLATE, [id number], OCCULTBLOOD in the last 48 hours.     Chemistries:  No results for input(s): NA, K, CL, CO2, BUN, CREATININE, CALCIUM, ALBUMIN, PROT, BILITOT, ALKPHOS, ALT, AST, GLUCOSE, MG, PHOS in the last 48 hours.    All pertinent labs within the past 24 hours have been reviewed.    Significant Imaging:  I have reviewed all pertinent imaging results/findings within the past 24 hours.

## 2022-02-19 NOTE — ASSESSMENT & PLAN NOTE
- s/p multiple debridements  - wound vac in place  - ID consulted for antibiotic management: He was treated with rocephin, daptomycin, clindamycin. 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 Cazares

## 2022-02-19 NOTE — PROGRESS NOTES
[facility]  Pulmonology  Progress Note    Patient Name: Og Garcia  MRN: [id number]  Admission Date: 12/23/2021  Hospital Length of Stay: 58 days  Code Status: Prior  Attending Provider: Cecil Abernathy DO  Primary Care Provider: Hector Arndt DNP, FNP-C   Principal Problem: Denice gangrene    Subjective:     Interval History: alert but does not follow commands for me today.     Objective:     Vital Signs (Most Recent):  Temp: 99.8 °F (37.7 °C) (02/19/22 0800)  Pulse: 99 (02/19/22 0800)  Resp: (!) 26 (02/19/22 0800)  BP: (!) 127/56 (02/19/22 0800)  SpO2: 100 % (02/19/22 0800)   Vital Signs (24h Range):  Temp:  [98.5 °F (36.9 °C)-100 °F (37.8 °C)] 99.8 °F (37.7 °C)  Pulse:  [] 99  Resp:  [10-32] 26  SpO2:  [100 %] 100 %  BP: (102-140)/(51-69) 127/56     Weight: 80.9 kg (178 lb 5.6 oz)  Body mass index is 24.88 kg/m².      Intake/Output Summary (Last 24 hours) at 2/19/2022 1038  Last data filed at 2/19/2022 0600  Gross per 24 hour   Intake 1515 ml   Output 1200 ml   Net 315 ml       Physical Exam  Vitals reviewed.   Constitutional:       General: He is not in acute distress.  HENT:      Head:      Comments: Right temporal deformity     Nose: Nose normal.      Mouth/Throat:      Mouth: Mucous membranes are moist.   Eyes:      Comments: enucleated right eye   Neck:      Comments: Tracheostomy in place  Cardiovascular:      Rate and Rhythm: Normal rate and regular rhythm.      Pulses: Normal pulses.      Heart sounds: Normal heart sounds.   Pulmonary:      Effort: No respiratory distress.      Breath sounds: No stridor. No wheezing, rhonchi or rales.      Comments: Ventilator driven breath sounds bilaterally. Coarse  Abdominal:      Palpations: Abdomen is soft.      Tenderness: There is no guarding.   Genitourinary:     Comments: Packing in place  Musculoskeletal:         General: No swelling or deformity.      Cervical back: Neck supple.      Right lower leg: No edema.       Left lower leg: No edema.   Lymphadenopathy:      Cervical: No cervical adenopathy.   Skin:     General: Skin is warm and dry.   Neurological:      Mental Status: He is alert.      Cranial Nerves: No cranial nerve deficit.      Motor: Weakness present.       Vents:  Vent Mode: A/C (02/19/22 0252)  Set Rate: 2 BPM (02/19/22 0800)  Vt Set: 480 mL (02/19/22 0800)  Pressure Support: 15 cmH20 (02/19/22 0800)  PEEP/CPAP: 5 cmH20 (02/19/22 0800)  Oxygen Concentration (%): 40 (02/19/22 0800)  Peak Airway Pressure: 21 cmH2O (02/19/22 0800)  Total Ve: 8.5 mL (02/19/22 0800)  F/VT Ratio<105 (RSBI): (!) 46.18 (02/19/22 0800)    Lines/Drains/Airways       Peripherally Inserted Central Catheter Line  Duration             PICC Double Lumen 01/05/22 left basilic 45 days              Central Venous Catheter Line  Duration                  Hemodialysis Catheter 12/30/21 0900 right internal jugular 51 days              Drain  Duration                  NG/OG Tube [place] sump 18 Fr. Left nostril -- days         Colostomy 12/18/21 1030 Descending/sigmoid LUQ 63 days              Airway  Duration                  Surgical Airway 01/04/22 Shiley 46 days                    Significant Labs:    CBC/Anemia Profile:  No results for input(s): WBC, HGB, HCT, PLT, MCV, RDW, IRON, FERRITIN, RETIC, FOLATE, [id number], OCCULTBLOOD in the last 48 hours.     Chemistries:  No results for input(s): NA, K, CL, CO2, BUN, CREATININE, CALCIUM, ALBUMIN, PROT, BILITOT, ALKPHOS, ALT, AST, GLUCOSE, MG, PHOS in the last 48 hours.    All pertinent labs within the past 24 hours have been reviewed.    Significant Imaging:  I have reviewed all pertinent imaging results/findings within the past 24 hours.    Assessment/Plan:     * Denice gangrene  - s/p multiple debridements  - wound vac in place  - ID consulted for antibiotic management: He was treated with rocephin, daptomycin, clindamycin. 12/21- change clindamycin to ampicillin and treat for another week  -  remains on ampicillin, this was changed to merrem 1/9  - pathology with fungal species consistent with mucormycosis initiated on amphotericin  -  patient went to OR on 1/10 and 1/12 for debridement  2/9- antibiotics and amphotericin completed  2/17- patient is going back to OR today with Dr. Cazares      Type 2 diabetes mellitus without complication, without long-term current use of insulin  - continue ISS  -accuchecks in last 24 hours look good      RAHAT (acute kidney injury)  - tunneled HD line placement 12/30  - Continues with dialysis per nephrology    On mechanically assisted ventilation  - 12/14 intubated, tracheostomy 1/4  - Increase weaning trials    Hypertension  - continue diltiazem               Raul Hall MD  Pulmonology  [facility]

## 2022-02-19 NOTE — PROGRESS NOTES
[facility]  Nephrology  Progress Note    Patient Name: Og Garcia  MRN: [id number]  Admission Date: 12/23/2021  Hospital Length of Stay: 58 days  Attending Provider: Cecil Abernathy DO   Primary Care Physician: Hector Arndt, JULISSA, FNP-C  Principal Problem:Denice gangrene    Consults  Subjective:     Interval History: The patient has no complaints today    Review of patient's allergies indicates:  No Known Allergies  Current Facility-Administered Medications   Medication Frequency    0.9%  NaCl infusion PRN    acetaminophen tablet 500 mg Q6H PRN    albuterol nebulizer solution 2.5 mg Q4H PRN    bisacodyL EC tablet 10 mg Daily PRN    calcium gluconate 1 g in dextrose 5 % in water (D5W) 5 % 100 mL IVPB (MB+) Once    dextromethorphan-guaiFENesin  mg/5 ml liquid 10 mL Q6H PRN    dextrose 50% injection 12.5 g PRN    diltiaZEM tablet 60 mg Q6H    fentaNYL 50 mcg/hr 1 patch Q72H    glucagon (human recombinant) injection 1 mg PRN    heparin (porcine) injection 4,000 Units PRN    insulin aspart U-100 injection 1-10 Units Q6H    lorazepam injection 2 mg Q6H PRN    ondansetron injection 8 mg Q6H PRN    pantoprazole suspension 40 mg Daily    sevelamer carbonate pwpk 0.8 g TID WM    silver sulfADIAZINE 1% cream PRN    simethicone chewable tablet 80 mg TID PRN       Objective:     Vital Signs (Most Recent):  Temp: 99.2 °F (37.3 °C) (02/19/22 1600)  Pulse: 99 (02/19/22 1700)  Resp: 19 (02/19/22 1700)  BP: 121/67 (02/19/22 1700)  SpO2: 96 % (02/19/22 1700)  O2 Device (Oxygen Therapy): ventilator (02/19/22 1549) Vital Signs (24h Range):  Temp:  [99 °F (37.2 °C)-100 °F (37.8 °C)] 99.2 °F (37.3 °C)  Pulse:  [] 99  Resp:  [12-31] 19  SpO2:  [92 %-100 %] 96 %  BP: ()/(51-67) 121/67     Weight: 80.9 kg (178 lb 5.6 oz) (02/19/22 0600)  Body mass index is 24.88 kg/m².  Body surface area is 2.01 meters squared.    I/O last 3 completed shifts:  In: 2092  [NG/GT:2092]  Out: 1350 [Other:1000; Stool:350]    Physical Exam   Lungs-clear  Cor-1/6 systolic murmur  Abd-soft    Significant Labs:sure  CBC:   Recent Labs   Lab 02/14/22  0704   WBC 14.58*   RBC 2.62*   HGB 7.9*   HCT 23.7*   *   MCV 90.5   MCH 30.2   MCHC 33.3     CMP:   Recent Labs   Lab 02/14/22  0705   *   CALCIUM 8.0*   ALBUMIN 1.3*   PROT 7.4      K 3.9   CO2 27      BUN 56*   CREATININE 1.55*   ALKPHOS 631*   ALT 75*   *   BILITOT 0.4     All labs within the past 24 hours have been reviewed.    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 Unknown    Hyperphosphatemia [E83.39] 01/07/2022 No    Acute blood loss anemia [D62] 12/25/2021 No    Type 2 diabetes mellitus without complication, without long-term current use of insulin [E11.9] 12/25/2021 Yes    Necrotizing fasciitis [M72.6]  Yes    Hypocalcemia [E83.51] 12/16/2021 Yes    RAHAT (acute kidney injury) [N17.9] 12/14/2021 Yes    On mechanically assisted ventilation [Z99.11] 12/14/2021 Not Applicable    Hypertension [I10] 09/02/2021 Yes      Problems Resolved During this Admission:    Diagnosis Date Noted Date Resolved POA    Ventilator associated pneumonia [J95.851] 01/09/2022 01/27/2022 No    Shock [R57.9] 01/08/2022 01/16/2022 Yes     Plan:  Continue the present care    Thank you for your consult. I will follow-up with patient. Please contact us if you have any additional questions.    Damir Avalos MD  Nephrology  [facility]

## 2022-02-20 NOTE — PLAN OF CARE
Problem: Fall Injury Risk  Goal: Absence of Fall and Fall-Related Injury  Outcome: Ongoing, Progressing     Problem: Skin Injury Risk Increased  Goal: Skin Health and Integrity  Outcome: Ongoing, Progressing     Problem: Hemodynamic Instability (Hemodialysis)  Goal: Effective Tissue Perfusion  Outcome: Ongoing, Progressing     Problem: Diabetes Comorbidity  Goal: Blood Glucose Level Within Targeted Range  Outcome: Ongoing, Progressing     Problem: Impaired Wound Healing  Goal: Optimal Wound Healing  Outcome: Ongoing, Progressing     Problem: Gas Exchange Impaired  Goal: Optimal Gas Exchange  Outcome: Ongoing, Progressing

## 2022-02-20 NOTE — ASSESSMENT & PLAN NOTE
- s/p multiple debridements  - wound vac in place  - ID consulted for antibiotic management: He was treated with rocephin, daptomycin, clindamycin. 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 Cazares

## 2022-02-20 NOTE — NURSING
Received  Patient lying in bed resting.  No noted distress. #8LPC trach midline and intact. Fetanyl patch to left arm intact.N/G tube to left nare with Vital AF @ 75ml/hr. Multiple dressing. Dressing to abdomen, sacral, bilateral feet and right hand intact L PICC line intact and secure with no IV complications.

## 2022-02-20 NOTE — ASSESSMENT & PLAN NOTE
- 12/14 intubated, tracheostomy 1/4  - Increase weaning trials, continues to have periods of apnea reported by RT on SIMV trials

## 2022-02-20 NOTE — PROGRESS NOTES
[facility]  Pulmonology  Progress Note    Patient Name: Og Garcia  MRN: [id number]  Admission Date: 12/23/2021  Hospital Length of Stay: 59 days  Code Status: Prior  Attending Provider: Cecil Abernathy DO  Primary Care Provider: Hector Arndt DNP, FNP-C   Principal Problem: Denice gangrene    Subjective:     Interval History: No change    Objective:     Vital Signs (Most Recent):  Temp: 99.4 °F (37.4 °C) (02/20/22 0700)  Pulse: 99 (02/20/22 0802)  Resp: 17 (02/20/22 0802)  BP: (!) 115/58 (02/20/22 0802)  SpO2: 100 % (02/20/22 0802)   Vital Signs (24h Range):  Temp:  [98.8 °F (37.1 °C)-99.4 °F (37.4 °C)] 99.4 °F (37.4 °C)  Pulse:  [] 99  Resp:  [9-30] 17  SpO2:  [92 %-100 %] 100 %  BP: ()/(53-67) 115/58     Weight: 83 kg (182 lb 15.7 oz)  Body mass index is 25.52 kg/m².      Intake/Output Summary (Last 24 hours) at 2/20/2022 0902  Last data filed at 2/20/2022 0600  Gross per 24 hour   Intake 900 ml   Output 300 ml   Net 600 ml       Physical Exam  Vitals reviewed.   Constitutional:       General: He is not in acute distress.  HENT:      Head:      Comments: Right temporal deformity     Nose: Nose normal.      Mouth/Throat:      Mouth: Mucous membranes are moist.   Eyes:      Comments: enucleated right eye   Neck:      Comments: Tracheostomy in place  Cardiovascular:      Rate and Rhythm: Normal rate and regular rhythm.      Pulses: Normal pulses.      Heart sounds: Normal heart sounds.   Pulmonary:      Effort: No respiratory distress.      Breath sounds: No stridor. No wheezing, rhonchi or rales.      Comments: Ventilator driven breath sounds bilaterally. Coarse  Abdominal:      Palpations: Abdomen is soft.      Tenderness: There is no guarding.   Genitourinary:     Comments: Packing in place  Musculoskeletal:         General: No swelling or deformity.      Cervical back: Neck supple.      Right lower leg: No edema.      Left lower leg: No edema.   Lymphadenopathy:       Cervical: No cervical adenopathy.   Skin:     General: Skin is warm and dry.   Neurological:      Mental Status: He is alert.      Cranial Nerves: No cranial nerve deficit.      Motor: Weakness present.       Vents:  Vent Mode: SIMV (VC) + PS (02/20/22 0802)  Set Rate: 2 BPM (02/20/22 0802)  Vt Set: 480 mL (02/20/22 0802)  Pressure Support: 15 cmH20 (02/20/22 0802)  PEEP/CPAP: 5 cmH20 (02/20/22 0802)  Oxygen Concentration (%): 40 (02/20/22 0400)  Peak Airway Pressure: 19 cmH2O (02/20/22 0802)  Total Ve: 5.4 mL (02/20/22 0802)  F/VT Ratio<105 (RSBI): (!) 33.33 (02/20/22 0802)    Lines/Drains/Airways       Peripherally Inserted Central Catheter Line  Duration             PICC Double Lumen 01/05/22 left basilic 46 days              Central Venous Catheter Line  Duration                  Hemodialysis Catheter 12/30/21 0900 right internal jugular 52 days              Drain  Duration                  NG/OG Tube [place] sump 18 Fr. Left nostril -- days         Colostomy 12/18/21 1030 Descending/sigmoid LUQ 63 days              Airway  Duration                  Surgical Airway 01/04/22 Shiley 47 days                    Significant Labs:    CBC/Anemia Profile:  No results for input(s): WBC, HGB, HCT, PLT, MCV, RDW, IRON, FERRITIN, RETIC, FOLATE, [id number], OCCULTBLOOD in the last 48 hours.     Chemistries:  No results for input(s): NA, K, CL, CO2, BUN, CREATININE, CALCIUM, ALBUMIN, PROT, BILITOT, ALKPHOS, ALT, AST, GLUCOSE, MG, PHOS in the last 48 hours.    All pertinent labs within the past 24 hours have been reviewed.    Significant Imaging:  I have reviewed all pertinent imaging results/findings within the past 24 hours.    Assessment/Plan:     * Denice gangrene  - s/p multiple debridements  - wound vac in place  - ID consulted for antibiotic management: He was treated with rocephin, daptomycin, clindamycin. 12/21- change clindamycin to ampicillin and treat for another week  - remains on ampicillin, this was changed  to merrem 1/9  - pathology with fungal species consistent with mucormycosis initiated on amphotericin  -  patient went to OR on 1/10 and 1/12 for debridement  - 2/9- antibiotics and amphotericin completed  - 2/17 back to OR today with Dr. Cazares      Type 2 diabetes mellitus without complication, without long-term current use of insulin  - continue ISS  - glucose stable/controlled      RAHAT (acute kidney injury)  - tunneled HD line placement 12/30  - Continues with dialysis per nephrology    On mechanically assisted ventilation  - 12/14 intubated, tracheostomy 1/4  - Increase weaning trials, continues to have periods of apnea reported by RT on SIMV trials    Hypertension  - continue diltiazem                 Raul Hall MD  Pulmonology  [facility]

## 2022-02-20 NOTE — PLAN OF CARE
Problem: Impaired Wound Healing  Goal: Optimal Wound Healing  Outcome: Ongoing, Progressing     Problem: Gas Exchange Impaired  Goal: Optimal Gas Exchange  Outcome: Ongoing, Progressing     Problem: Infection (Hemodialysis)  Goal: Absence of Infection Signs and Symptoms  Outcome: Ongoing, Progressing     Problem: Diabetes Comorbidity  Goal: Blood Glucose Level Within Targeted Range  Outcome: Ongoing, Progressing

## 2022-02-20 NOTE — SUBJECTIVE & OBJECTIVE
Interval History: No change    Objective:     Vital Signs (Most Recent):  Temp: 99.4 °F (37.4 °C) (02/20/22 0700)  Pulse: 99 (02/20/22 0802)  Resp: 17 (02/20/22 0802)  BP: (!) 115/58 (02/20/22 0802)  SpO2: 100 % (02/20/22 0802)   Vital Signs (24h Range):  Temp:  [98.8 °F (37.1 °C)-99.4 °F (37.4 °C)] 99.4 °F (37.4 °C)  Pulse:  [] 99  Resp:  [9-30] 17  SpO2:  [92 %-100 %] 100 %  BP: ()/(53-67) 115/58     Weight: 83 kg (182 lb 15.7 oz)  Body mass index is 25.52 kg/m².      Intake/Output Summary (Last 24 hours) at 2/20/2022 0902  Last data filed at 2/20/2022 0600  Gross per 24 hour   Intake 900 ml   Output 300 ml   Net 600 ml       Physical Exam  Vitals reviewed.   Constitutional:       General: He is not in acute distress.  HENT:      Head:      Comments: Right temporal deformity     Nose: Nose normal.      Mouth/Throat:      Mouth: Mucous membranes are moist.   Eyes:      Comments: enucleated right eye   Neck:      Comments: Tracheostomy in place  Cardiovascular:      Rate and Rhythm: Normal rate and regular rhythm.      Pulses: Normal pulses.      Heart sounds: Normal heart sounds.   Pulmonary:      Effort: No respiratory distress.      Breath sounds: No stridor. No wheezing, rhonchi or rales.      Comments: Ventilator driven breath sounds bilaterally. Coarse  Abdominal:      Palpations: Abdomen is soft.      Tenderness: There is no guarding.   Genitourinary:     Comments: Packing in place  Musculoskeletal:         General: No swelling or deformity.      Cervical back: Neck supple.      Right lower leg: No edema.      Left lower leg: No edema.   Lymphadenopathy:      Cervical: No cervical adenopathy.   Skin:     General: Skin is warm and dry.   Neurological:      Mental Status: He is alert.      Cranial Nerves: No cranial nerve deficit.      Motor: Weakness present.       Vents:  Vent Mode: SIMV (VC) + PS (02/20/22 0802)  Set Rate: 2 BPM (02/20/22 0802)  Vt Set: 480 mL (02/20/22 0802)  Pressure Support:  15 cmH20 (02/20/22 0802)  PEEP/CPAP: 5 cmH20 (02/20/22 0802)  Oxygen Concentration (%): 40 (02/20/22 0400)  Peak Airway Pressure: 19 cmH2O (02/20/22 0802)  Total Ve: 5.4 mL (02/20/22 0802)  F/VT Ratio<105 (RSBI): (!) 33.33 (02/20/22 0802)    Lines/Drains/Airways       Peripherally Inserted Central Catheter Line  Duration             PICC Double Lumen 01/05/22 left basilic 46 days              Central Venous Catheter Line  Duration                  Hemodialysis Catheter 12/30/21 0900 right internal jugular 52 days              Drain  Duration                  NG/OG Tube [place] sump 18 Fr. Left nostril -- days         Colostomy 12/18/21 1030 Descending/sigmoid LUQ 63 days              Airway  Duration                  Surgical Airway 01/04/22 Shiley 47 days                    Significant Labs:    CBC/Anemia Profile:  No results for input(s): WBC, HGB, HCT, PLT, MCV, RDW, IRON, FERRITIN, RETIC, FOLATE, [id number], OCCULTBLOOD in the last 48 hours.     Chemistries:  No results for input(s): NA, K, CL, CO2, BUN, CREATININE, CALCIUM, ALBUMIN, PROT, BILITOT, ALKPHOS, ALT, AST, GLUCOSE, MG, PHOS in the last 48 hours.    All pertinent labs within the past 24 hours have been reviewed.    Significant Imaging:  I have reviewed all pertinent imaging results/findings within the past 24 hours.

## 2022-02-21 NOTE — NURSING
Pt lying in bed resting.  No noted distress. Fetanyl patch to left chest wall intact and secure. Left  PICC line intact and secure with no IV complications #8 trach midline and intact Pt on vent tolerating at this time. [place] sump intact to left nare with Vital AF at 75ml/hr. Dressing to abdomen,sacral, bilateral feet and right hand. Colostomy intact and secure with no complications.  Waffle boots/SCD hose on and in use.

## 2022-02-21 NOTE — ASSESSMENT & PLAN NOTE
BP currently on low side. Medications are currently being held pending surgery  Hgb is down to 6.5. Will plan to transfuse with surgery

## 2022-02-21 NOTE — OP NOTE
[facility] - [facility]iop Services  Surgery Department  Operative Note    SUMMARY     Date of Procedure: 2/21/2022     Procedure: Procedure(s) (LRB):  APPLICATION, GRAFT, SKIN, TO MAJOR WOUND (N/A)     Surgeon(s) and Role:     * Harish Cazares MD - Primary    Assisting Surgeon: None    Pre-Operative Diagnosis: Necrotizing fasciitis [M72.6]    Post-Operative Diagnosis: Post-Op Diagnosis Codes:     * Necrotizing fasciitis [M72.6]    Anesthesia: General/MAC    Procedures Performed:  Split-thickness skin graft to abdominal wound    Significant Findings of the Procedure:  19 x 17 cm area at the abdominal area requiring grafting    Procedure in Detail:  After informed consent patient brought to the OR position.  The right leg were prepped and draped in the usual sterile fashion.  Patient's old ostomy site was covered up with the Tegaderm.  We then measured out in the abdominal area with good granulation tissue was 19 x 17 cm in size and required a split-thickness skin graft.  The right anterior thigh was harvested and we obtained a 3 in wide specimen and harvested approximately a 21 cm length piece of the anterior thigh after putting mineral oil and doing counter tension.  We then meshed this in a 3-1 ratio and laid this on the abdominal area with the harvest site down.  We then placed a Xeroform gauze on the right thigh harvest site.  We then placed some Vaseline gauze on the abdominal area and placed a black wound VAC with suction to 60 mmHg of pressure.  We then dressed the area and wrapped the leg with Kerlix.  Patient tolerated the procedure well.    Complications: No    Estimated Blood Loss (EBL): * No values recorded between 2/21/2022 12:25 PM and 2/21/2022  1:25 PM *           Implants: * No implants in log *    Specimens:   Specimen (24h ago, onward)            None                  Condition: Good    Disposition: PACU - hemodynamically stable.    Attestation: I was present and scrubbed for the entire  procedure.

## 2022-02-21 NOTE — PLAN OF CARE
Lying in bed resting. No noted distress. Picc line intact with no complications. Multiple dressing intact. Fetanyl to left chestwall intact. Right subclavian hemodialysis catheter intact. Safety measures ongoing.
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
## 2022-02-21 NOTE — PROGRESS NOTES
[facility]  Pulmonology  Progress Note    Patient Name: Og Garcia  MRN: [id number]  Admission Date: 2/21/2022  Hospital Length of Stay: 0 days  Code Status: Prior  Attending Provider: Harish Cazares MD  Primary Care Provider: Hector Arndt DNP, FNP-C   Principal Problem: <principal problem not specified>    Subjective:     Interval History: No acute events overnight. Currently hemodynamically stable. Oxygenating adequately on the ventilator. Currently afebrile. Plan to go back to OR today.    Objective:     Vital Signs (Most Recent):    Vital Signs (24h Range):  Temp:  [98.7 °F (37.1 °C)-99.5 °F (37.5 °C)] 99.5 °F (37.5 °C)  Pulse:  [] 95  Resp:  [11-34] 26  SpO2:  [96 %-100 %] 100 %  BP: ()/(47-66) 101/49        There is no height or weight on file to calculate BMI.      Intake/Output Summary (Last 24 hours) at 2/21/2022 1243  Last data filed at 2/21/2022 1220  Gross per 24 hour   Intake 650 ml   Output 200 ml   Net 450 ml         Physical Exam  Vitals reviewed.   Constitutional:       General: He is not in acute distress.     Appearance: He is not ill-appearing.      Comments: Chronically ill appearing   HENT:      Head: Normocephalic and atraumatic.      Right Ear: External ear normal.      Left Ear: External ear normal.      Nose: Nose normal.      Mouth/Throat:      Mouth: Mucous membranes are moist.      Comments: trach  Eyes:      Conjunctiva/sclera: Conjunctivae normal.      Pupils: Pupils are equal, round, and reactive to light.   Cardiovascular:      Rate and Rhythm: Regular rhythm. Tachycardia present.      Pulses: Normal pulses.      Heart sounds: Normal heart sounds.   Pulmonary:      Effort: No respiratory distress.      Comments: clear breath sounds anteriorly  Abdominal:      Palpations: Abdomen is soft.   Musculoskeletal:         General: Normal range of motion.      Cervical back: Neck supple.   Lymphadenopathy:      Cervical: No cervical adenopathy.    Skin:     General: Skin is warm and dry.      Coloration: Skin is not pale.   Neurological:      Mental Status: Mental status is at baseline.      Comments: Remains on mechanical ventilation. Will track at times   Psychiatric:         Behavior: Behavior normal.       Vents:       Lines/Drains/Airways       Peripherally Inserted Central Catheter Line  Duration             PICC Double Lumen 01/05/22 left basilic 47 days              Central Venous Catheter Line  Duration                  Hemodialysis Catheter 12/30/21 0900 right internal jugular 53 days              Drain  Duration                  NG/OG Tube Sophia sump 18 Fr. Left nostril -- days         Colostomy 12/18/21 1030 Descending/sigmoid LUQ 65 days              Airway  Duration                  Surgical Airway 01/04/22 Shiley 48 days                    Significant Labs:    CBC/Anemia Profile:  Recent Labs   Lab 02/21/22  0824   WBC 18.47*   HGB 6.5*   HCT 20.1*   *   MCV 91.4   RDW 14.6*        Chemistries:  Recent Labs   Lab 02/21/22  0824      K 5.1      CO2 24   BUN 81*   CREATININE 3.44*   CALCIUM 8.9       All pertinent labs within the past 24 hours have been reviewed.    Significant Imaging:  I have reviewed all pertinent imaging results/findings within the past 24 hours.    Assessment/Plan:     Type 2 diabetes mellitus without complication, without long-term current use of insulin  - continue ISS  - glucose stable/controlled       Acute blood loss anemia  - Hgb is down to 6.5  - will transfuse 1 unit with next HD given fact bp is low normal    RAHAT (acute kidney injury)  - tunneled HD line placement 12/30  - Continues with dialysis per nephrology    On mechanically assisted ventilation  - 12/14 intubated, tracheostomy 1/4  - Increase weaning trials, continues to have periods of apnea reported by RT on SIMV trials       Denice gangrene  - s/p multiple debridements  - back to OR today    Hypertension  BP currently on low side.  Medications are currently being held pending surgery  Hgb is down to 6.5. Will plan to transfuse with surgery                 Cecil Abernathy,   Pulmonology  [facility]

## 2022-02-21 NOTE — PLAN OF CARE
Problem: Gas Exchange Impaired  Goal: Optimal Gas Exchange  Outcome: Ongoing, Progressing     Problem: Diabetes Comorbidity  Goal: Blood Glucose Level Within Targeted Range  Outcome: Ongoing, Progressing

## 2022-02-21 NOTE — HOSPITAL COURSE
2/21- Plan for patient to go to OR today. He is currently afebrile. Doing ok with his weaning trials

## 2022-02-21 NOTE — TRANSFER OF CARE
Anesthesia Transfer of Care Note    Patient: [facility]    Procedure(s) Performed: Procedure(s) (LRB):  APPLICATION, GRAFT, SKIN, TO MAJOR WOUND (N/A)    Patient location: Other: TRAV    Anesthesia Type: general    Transport from OR: Transported from OR on room air with adequate spontaneous ventilation. Continuous ECG monitoring in transport. Continuous SpO2 monitoring in transport. Upon arrival to PACU/ICU, patient attached to ventilator and auscultated to confirm bilateral breath sounds and adequate TV    Post pain: adequate analgesia    Post assessment: no apparent anesthetic complications and tolerated procedure well    Post vital signs: stable    Level of consciousness: awake, alert and oriented    Nausea/Vomiting: no nausea/vomiting    Complications: none    Transfer of care protocol was followed      Last vitals:   Visit Vitals  /79 (BP Location: Right arm, Patient Position: Lying)   Pulse 86   Temp 36.9 °C (98.5 °F) (Oral)   Resp 20   SpO2 100%

## 2022-02-21 NOTE — OR NURSING
1330: RECEIVED FROM OR. PT ON VENTILATOR WITH SETTINGS SET: ASSIST CONTROL 16, TIDAL VOLUME 480, PEEP 5, 02 AT 40%. PT HAS WOUND VAC SET TO -60. ABDOMINAL DRESSING CDI. RIGHT THIGH DRESSING CDI.     1345: NO ACUTE CHANGES.    1355: PT IS STABLE. NO ACUTE CHANGES. REPORT GIVEN TO DEEPAK KOHLER RN

## 2022-02-21 NOTE — ANESTHESIA POSTPROCEDURE EVALUATION
Anesthesia Post Evaluation    Patient: Og [facility]    Procedure(s) Performed: Procedure(s) (LRB):  APPLICATION, GRAFT, SKIN, TO MAJOR WOUND (N/A)    Final Anesthesia Type: general      Patient location during evaluation: telemetry step down  Patient participation: Yes- Able to Participate  Level of consciousness: awake and alert  Post-procedure vital signs: reviewed and stable  Pain management: adequate  Airway patency: patent  NAMITA mitigation strategies: Multimodal analgesia  PONV status at discharge: No PONV  Anesthetic complications: no      Cardiovascular status: blood pressure returned to baseline  Respiratory status: Tracheostomy  Hydration status: euvolemic  Follow-up not needed.          Vitals Value Taken Time   /79 02/21/22 1357   Temp 36.9 °C (98.5 °F) 02/21/22 1357   Pulse 86 02/21/22 1357   Resp 20 02/21/22 1357   SpO2 100 % 02/21/22 1357         Event Time   Out of Recovery 13:55:00         Pain/Rio Score: Pain Rating Prior to Med Admin: 4 (2/20/2022 12:31 PM)  Rio Score: 4 (2/21/2022  2:00 PM)

## 2022-02-21 NOTE — SUBJECTIVE & OBJECTIVE
Interval History: No acute events overnight. Currently hemodynamically stable. Oxygenating adequately on the ventilator. Currently afebrile. Plan to go back to OR today.    Objective:     Vital Signs (Most Recent):    Vital Signs (24h Range):  Temp:  [98.7 °F (37.1 °C)-99.5 °F (37.5 °C)] 99.5 °F (37.5 °C)  Pulse:  [] 95  Resp:  [11-34] 26  SpO2:  [96 %-100 %] 100 %  BP: ()/(47-66) 101/49        There is no height or weight on file to calculate BMI.      Intake/Output Summary (Last 24 hours) at 2/21/2022 1243  Last data filed at 2/21/2022 1220  Gross per 24 hour   Intake 650 ml   Output 200 ml   Net 450 ml         Physical Exam  Vitals reviewed.   Constitutional:       General: He is not in acute distress.     Appearance: He is not ill-appearing.      Comments: Chronically ill appearing   HENT:      Head: Normocephalic and atraumatic.      Right Ear: External ear normal.      Left Ear: External ear normal.      Nose: Nose normal.      Mouth/Throat:      Mouth: Mucous membranes are moist.      Comments: trach  Eyes:      Conjunctiva/sclera: Conjunctivae normal.      Pupils: Pupils are equal, round, and reactive to light.   Cardiovascular:      Rate and Rhythm: Regular rhythm. Tachycardia present.      Pulses: Normal pulses.      Heart sounds: Normal heart sounds.   Pulmonary:      Effort: No respiratory distress.      Comments: clear breath sounds anteriorly  Abdominal:      Palpations: Abdomen is soft.   Musculoskeletal:         General: Normal range of motion.      Cervical back: Neck supple.   Lymphadenopathy:      Cervical: No cervical adenopathy.   Skin:     General: Skin is warm and dry.      Coloration: Skin is not pale.   Neurological:      Mental Status: Mental status is at baseline.      Comments: Remains on mechanical ventilation. Will track at times   Psychiatric:         Behavior: Behavior normal.       Vents:       Lines/Drains/Airways       Peripherally Inserted Central Catheter Line   Duration             PICC Double Lumen 01/05/22 left basilic 47 days              Central Venous Catheter Line  Duration                  Hemodialysis Catheter 12/30/21 0900 right internal jugular 53 days              Drain  Duration                  NG/OG Tube Alpha sump 18 Fr. Left nostril -- days         Colostomy 12/18/21 1030 Descending/sigmoid LUQ 65 days              Airway  Duration                  Surgical Airway 01/04/22 Shiley 48 days                    Significant Labs:    CBC/Anemia Profile:  Recent Labs   Lab 02/21/22  0824   WBC 18.47*   HGB 6.5*   HCT 20.1*   *   MCV 91.4   RDW 14.6*        Chemistries:  Recent Labs   Lab 02/21/22  0824      K 5.1      CO2 24   BUN 81*   CREATININE 3.44*   CALCIUM 8.9       All pertinent labs within the past 24 hours have been reviewed.    Significant Imaging:  I have reviewed all pertinent imaging results/findings within the past 24 hours.

## 2022-02-22 NOTE — ASSESSMENT & PLAN NOTE
- s/p multiple debridements  - wound vac in place  - ID consulted for antibiotic management: He was treated with rocephin, daptomycin, clindamycin. 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 Cazares  2/22- back to OR yesterday for skin grafting

## 2022-02-22 NOTE — ASSESSMENT & PLAN NOTE
Dialyzed yesterday  1/31/2022 Continue with scheduled hemodialysis.  2/1/2022 Continue with dialysis for this patient.  2/2/2022 Dialysis sessions are going well.  2/3/2022 Dialysis as scheduled for this patient.  2/4/2022 The patient is doing well with dialysis.  Metabolics are stable.  2/8/2022 Continue with dialysis for this patient.  2/14/2022 Continue with scheduled hemodialysis for this patient.  2/15/2022 Dialysis as scheduled for this patient.  2/16/2022 Continue dialysis.  2/17/2022 The condition is the same.    2/18/2022 Continue with dialysis for this patient.  2/22/2022 The patient continues to do well with dialysis.

## 2022-02-22 NOTE — SUBJECTIVE & OBJECTIVE
Interval History: No acute events overnight. Currently hemodynamically stable. Oxygenating adequately on the ventilator. Currently afebrile.     Objective:     Vital Signs (Most Recent):  Temp: 98.7 °F (37.1 °C) (02/22/22 0800)  Pulse: 98 (02/22/22 0800)  Resp: 14 (02/22/22 0800)  BP: (!) 118/57 (02/22/22 0800)  SpO2: 100 % (02/22/22 0800) Vital Signs (24h Range):  Temp:  [96.8 °F (36 °C)-98.7 °F (37.1 °C)] 98.7 °F (37.1 °C)  Pulse:  [] 98  Resp:  [13-28] 14  SpO2:  [94 %-100 %] 100 %  BP: ()/(49-79) 118/57     Weight: 79.1 kg (174 lb 6.1 oz)  Body mass index is 24.32 kg/m².      Intake/Output Summary (Last 24 hours) at 2/22/2022 1036  Last data filed at 2/22/2022 0340  Gross per 24 hour   Intake 900 ml   Output 2600 ml   Net -1700 ml         Physical Exam  Vitals reviewed.   Constitutional:       General: He is not in acute distress.     Appearance: He is not ill-appearing.      Comments: Chronically ill appearing   HENT:      Head: Normocephalic and atraumatic.      Right Ear: External ear normal.      Left Ear: External ear normal.      Nose: Nose normal.      Mouth/Throat:      Mouth: Mucous membranes are moist.      Comments: trach  Eyes:      Conjunctiva/sclera: Conjunctivae normal.      Pupils: Pupils are equal, round, and reactive to light.   Cardiovascular:      Rate and Rhythm: Regular rhythm. Tachycardia present.      Pulses: Normal pulses.      Heart sounds: Normal heart sounds.   Pulmonary:      Effort: No respiratory distress.      Comments: clear breath sounds anteriorly  Abdominal:      Palpations: Abdomen is soft.   Musculoskeletal:         General: Normal range of motion.      Cervical back: Neck supple.   Lymphadenopathy:      Cervical: No cervical adenopathy.   Skin:     General: Skin is warm and dry.      Coloration: Skin is not pale.   Neurological:      Mental Status: Mental status is at baseline.      Comments: Remains on mechanical ventilation. Will track at times   Psychiatric:          Behavior: Behavior normal.       Vents:  Vent Mode: SIMV (02/22/22 0741)  Set Rate: 16 BPM (02/22/22 0346)  Vt Set: 480 mL (02/22/22 0741)  Pressure Support: 15 cmH20 (02/22/22 0741)  PEEP/CPAP: 5 cmH20 (02/22/22 0741)  Oxygen Concentration (%): 40 (02/22/22 0807)  Peak Airway Pressure: 19 cmH2O (02/22/22 0741)  Total Ve: 8.3 mL (02/22/22 0741)  F/VT Ratio<105 (RSBI): (!) 45.83 (02/21/22 1500)    Lines/Drains/Airways       Peripherally Inserted Central Catheter Line  Duration             PICC Double Lumen 01/05/22 left basilic 48 days              Central Venous Catheter Line  Duration                  Hemodialysis Catheter 12/30/21 0900 right internal jugular 54 days              Drain  Duration                  NG/OG Tube Cyril sump 18 Fr. Left nostril -- days         Colostomy 12/18/21 1030 Descending/sigmoid LUQ 66 days              Airway  Duration                  Surgical Airway 01/04/22 Shiley 49 days                    Significant Labs:    CBC/Anemia Profile:  Recent Labs   Lab 02/21/22  0824   WBC 18.47*   HGB 6.5*   HCT 20.1*   *   MCV 91.4   RDW 14.6*        Chemistries:  Recent Labs   Lab 02/21/22  0824      K 5.1      CO2 24   BUN 81*   CREATININE 3.44*   CALCIUM 8.9       All pertinent labs within the past 24 hours have been reviewed.    Significant Imaging:  I have reviewed all pertinent imaging results/findings within the past 24 hours.

## 2022-02-22 NOTE — SUBJECTIVE & OBJECTIVE
Interval History: The patient is resting.  He is awake and alert.  No other  changes.    Review of patient's allergies indicates:  No Known Allergies  Current Facility-Administered Medications   Medication Frequency    0.9%  NaCl infusion (for blood administration) Q24H PRN    0.9%  NaCl infusion PRN    acetaminophen tablet 500 mg Q6H PRN    albuterol nebulizer solution 2.5 mg Q4H PRN    bisacodyL EC tablet 10 mg Daily PRN    calcium gluconate 1 g in dextrose 5 % in water (D5W) 5 % 100 mL IVPB (MB+) Once    dextromethorphan-guaiFENesin  mg/5 ml liquid 10 mL Q6H PRN    dextrose 50% injection 12.5 g PRN    diltiaZEM tablet 60 mg Q6H    fentaNYL 50 mcg/hr 1 patch Q72H    glucagon (human recombinant) injection 1 mg PRN    heparin (porcine) injection 4,000 Units PRN    insulin aspart U-100 injection 1-10 Units Q6H    lorazepam injection 2 mg Q6H PRN    ondansetron injection 8 mg Q6H PRN    pantoprazole suspension 40 mg Daily    sevelamer carbonate pwpk 0.8 g TID WM    silver sulfADIAZINE 1% cream PRN    simethicone chewable tablet 80 mg TID PRN       Objective:     Vital Signs (Most Recent):  Temp: 98.7 °F (37.1 °C) (02/22/22 0800)  Pulse: 98 (02/22/22 0800)  Resp: 14 (02/22/22 0800)  BP: (!) 118/57 (02/22/22 0800)  SpO2: 100 % (02/22/22 0800)  O2 Device (Oxygen Therapy): ventilator (02/22/22 0741)   Vital Signs (24h Range):  Temp:  [96.8 °F (36 °C)-98.7 °F (37.1 °C)] 98.7 °F (37.1 °C)  Pulse:  [] 98  Resp:  [13-28] 14  SpO2:  [94 %-100 %] 100 %  BP: ()/(54-79) 118/57     Weight: 79.1 kg (174 lb 6.1 oz) (02/22/22 0400)  Body mass index is 24.32 kg/m².  Body surface area is 1.99 meters squared.    I/O last 3 completed shifts:  In: 1500 [Blood:200; Other:500; NG/GT:750; IV Piggyback:50]  Out: 2800 [Other:2400; Stool:400]    Physical Exam  Vitals reviewed.   HENT:      Head: Atraumatic.      Mouth/Throat:      Mouth: Mucous membranes are dry.   Cardiovascular:      Rate and Rhythm: Regular rhythm.       Heart sounds: Normal heart sounds.   Pulmonary:      Effort: Pulmonary effort is normal.   Abdominal:      General: Bowel sounds are normal.      Palpations: Abdomen is soft.   Neurological:      Mental Status: He is alert.       Significant Labs:  BMP:   Recent Labs   Lab 02/21/22  0824         K 5.1      CO2 24   BUN 81*   CREATININE 3.44*   CALCIUM 8.9     CBC:   Recent Labs   Lab 02/21/22  0824   WBC 18.47*   RBC 2.20*   HGB 6.5*   HCT 20.1*   *   MCV 91.4   MCH 29.5   MCHC 32.3        Significant Imaging:  Labs: Reviewed

## 2022-02-22 NOTE — ASSESSMENT & PLAN NOTE
- H/H down to 5.5/16 - pt had bleeding from his HD insertion site yesterday this has now resolved.   - will transfuse 2 units PRBCs on HD  1/2- 9.7/27.5 - repeat CBC in AM  1/4- Hgb is 7.2. Not sure if he will be transfused during surgery. IF not we will try to get  Him transfused with next HD  01/06/2021 transfuse 1 unit of packed red blood cells today  01/10/2021 needs a unit of blood  1/11- repeat H&H in am. Continue to transfuse as needed.  1/13- Hgb is 5. Transfusing today. Post transfusion H&H  1/14- CBC pending for today  1/15- 6/18 - hemodynamically stable, recheck in AM   1/16-- 6.1/17.1 - critical low blood shortage in hospital - holding transfusion until next HD since he is hemodynamically stable and not actively bleeding   01/17/2021 day transfuse blood on dialysis today  01/18/2020 to hemoglobin up to 9 after transfusion  01/25/2022 no signs of bleeding most recent hemoglobin 8  01/29/2022 patient will need transfusion today  1/31- Hgb 6.7 yesterday. Can transfuse with next HD session  2/2- H&H is pending  2/3- plan to transfuse 1 unit of PRBC's  2/4 Hgb is 6.9 today. We will transfuse another unit today  2/7 8.4 now  2/14- 7.9 today  2/15- oozing some today. We are going to hold heparin today. Continue to monitor H&H and transfuse as needed  INR 1.22. Oozing has improved  2/22- Hgb 6.5 on 2/21. Will transfuse 1 unit with next HD

## 2022-02-22 NOTE — PLAN OF CARE
Problem: Communication Impairment (Mechanical Ventilation, Invasive)  Goal: Effective Communication  Outcome: Ongoing, Progressing     Problem: Device-Related Complication Risk (Mechanical Ventilation, Invasive)  Goal: Optimal Device Function  Outcome: Ongoing, Progressing     Problem: Inability to Wean (Mechanical Ventilation, Invasive)  Goal: Mechanical Ventilation Liberation  Outcome: Ongoing, Progressing

## 2022-02-22 NOTE — PROGRESS NOTES
[facility]  Nephrology  Progress Note    Patient Name: Og Garcia  MRN: [id number]  Admission Date: 12/23/2021  Hospital Length of Stay: 61 days  Attending Provider: Cecil Abernathy DO   Primary Care Physician: Hector Arndt DNP, FNP-C  Principal Problem:Denice gangrene    Subjective:     HPI: The patient is known from the previous nephrology consult at [facility].  He is no at Specialty and continues to need dialysis support.      Interval History: The patient is resting.  He is awake and alert.  No other  changes.    Review of patient's allergies indicates:  No Known Allergies  Current Facility-Administered Medications   Medication Frequency    0.9%  NaCl infusion (for blood administration) Q24H PRN    0.9%  NaCl infusion PRN    acetaminophen tablet 500 mg Q6H PRN    albuterol nebulizer solution 2.5 mg Q4H PRN    bisacodyL EC tablet 10 mg Daily PRN    calcium gluconate 1 g in dextrose 5 % in water (D5W) 5 % 100 mL IVPB (MB+) Once    dextromethorphan-guaiFENesin  mg/5 ml liquid 10 mL Q6H PRN    dextrose 50% injection 12.5 g PRN    diltiaZEM tablet 60 mg Q6H    fentaNYL 50 mcg/hr 1 patch Q72H    glucagon (human recombinant) injection 1 mg PRN    heparin (porcine) injection 4,000 Units PRN    insulin aspart U-100 injection 1-10 Units Q6H    lorazepam injection 2 mg Q6H PRN    ondansetron injection 8 mg Q6H PRN    pantoprazole suspension 40 mg Daily    sevelamer carbonate pwpk 0.8 g TID WM    silver sulfADIAZINE 1% cream PRN    simethicone chewable tablet 80 mg TID PRN       Objective:     Vital Signs (Most Recent):  Temp: 98.7 °F (37.1 °C) (02/22/22 0800)  Pulse: 98 (02/22/22 0800)  Resp: 14 (02/22/22 0800)  BP: (!) 118/57 (02/22/22 0800)  SpO2: 100 % (02/22/22 0800)  O2 Device (Oxygen Therapy): ventilator (02/22/22 0741)   Vital Signs (24h Range):  Temp:  [96.8 °F (36 °C)-98.7 °F (37.1 °C)] 98.7 °F (37.1 °C)  Pulse:  [] 98  Resp:  [13-28] 14  SpO2:  [94  %-100 %] 100 %  BP: ()/(54-79) 118/57     Weight: 79.1 kg (174 lb 6.1 oz) (02/22/22 0400)  Body mass index is 24.32 kg/m².  Body surface area is 1.99 meters squared.    I/O last 3 completed shifts:  In: 1500 [Blood:200; Other:500; NG/GT:750; IV Piggyback:50]  Out: 2800 [Other:2400; Stool:400]    Physical Exam  Vitals reviewed.   HENT:      Head: Atraumatic.      Mouth/Throat:      Mouth: Mucous membranes are dry.   Cardiovascular:      Rate and Rhythm: Regular rhythm.      Heart sounds: Normal heart sounds.   Pulmonary:      Effort: Pulmonary effort is normal.   Abdominal:      General: Bowel sounds are normal.      Palpations: Abdomen is soft.   Neurological:      Mental Status: He is alert.       Significant Labs:  BMP:   Recent Labs   Lab 02/21/22  0824         K 5.1      CO2 24   BUN 81*   CREATININE 3.44*   CALCIUM 8.9     CBC:   Recent Labs   Lab 02/21/22  0824   WBC 18.47*   RBC 2.20*   HGB 6.5*   HCT 20.1*   *   MCV 91.4   MCH 29.5   MCHC 32.3        Significant Imaging:  Labs: Reviewed    Assessment/Plan:     RAHAT (acute kidney injury)  Dialyzed yesterday  1/31/2022 Continue with scheduled hemodialysis.  2/1/2022 Continue with dialysis for this patient.  2/2/2022 Dialysis sessions are going well.  2/3/2022 Dialysis as scheduled for this patient.  2/4/2022 The patient is doing well with dialysis.  Metabolics are stable.  2/8/2022 Continue with dialysis for this patient.  2/14/2022 Continue with scheduled hemodialysis for this patient.  2/15/2022 Dialysis as scheduled for this patient.  2/16/2022 Continue dialysis.  2/17/2022 The condition is the same.    2/18/2022 Continue with dialysis for this patient.  2/22/2022 The patient continues to do well with dialysis.        Thank you for your consult. I will follow-up with patient. Please contact us if you have any additional questions.    Edwin Pryor Jr, MD  Nephrology  [facility]

## 2022-02-22 NOTE — PROGRESS NOTES
[facility]  Adult Nutrition  Follow-up Note         Reason for Assessment  Reason For Assessment: RD follow-up  Nutrition Risk Screen: tube feeding or parenteral nutrition  Malnutrition  Is Patient Malnourished: No  Nutrition Diagnosis  Increased protein Needs   related to Decreased/ impaired skin integrity as evidenced by wounds    Nutrition Diagnosis Status: Improving      Nutrition Risk  Level of Risk/Frequency of Follow-up: high   Chewing or Swallowing Difficulty?: Swallowing difficulty  Estimated/Assessed Needs  RMR ([place]-St. Jeor Equation): 1593.13 Activity Factor: 1 Injury Factor: 1.2   Total Ve: 12.8 mL Temp: 98.7 °F (37.1 °C)Axillary  Weight Used For Calorie Calculations: 79.1 kg (174 lb 6.1 oz)   Energy Need Method: [facility] Energy Calorie Requirements (kcal): 1902  Weight Used For Protein Calculations: 79.1 kg (174 lb 6.1 oz)  Protein Requirements:   Estimated Fluid Requirement Method: RDA Method Fluid Requirements (mL): 1971  RDA Method (mL): 1902     Nutrition Prescription / Recommendations  Recommendation/Intervention: Tube feeding: Vital AF @ 75ml/hr; H20 flush of 50ml q 4hr  Goals: pt will meet estimated nutritional needs; wound healing, TF tolerance  Nutrition Goal Status: progressing towards goal  Communication of RD Recs: reviewed with physician  Current Diet Order: NPO  Nutrition Order Comments: Tube feeding: Vital AF @ 75ml/hr; H20 flush of 50ml q 4hr  Current Nutrition Support Formula Ordered: Vital AF 1.2  Current Nutrition Support Rate Ordered: 75 (ml)  Current Nutrition Support Frequency Ordered: hourly  Recommended Diet: Enteral Nutrition Tube feeding: Vital AF @ 75ml/hr; H20 flush of 50ml q 4hr  Recommended Oral Supplement: No Oral Supplements  Is Nutrition Support Recommended: No  Is Education Recommended: No  Monitor and Evaluation  % current Intake: Enteral Nutrition at goal  % intake to meet estimated needs: Enteral Nutrition   Food and Nutrient Intake:  enteral nutrition intake  Food and Nutrient Adminstration: enteral and parenteral nutrition administration  Anthropometric Measurements: height/length, body mass index, weight, weight change  Biochemical Data, Medical Tests and Procedures: electrolyte and renal panel, glucose/endocrine profile  Nutrition-Focused Physical Findings: skin  Enteral Calories (kcal): 2160  Enteral Protein (gm): 135  Enteral (Free Water) Fluid (mL): 1458  Free Water Flush Fluid (mL): 300  Parenteral Calories (kcal): 845  Parenteral Protein (gm): 48  Parenteral Fluid (mL): 960  Lipid Calories (kcals): 500 kcals  Other Calories (kcal): 528 (propofol)  Total Calories (kcal): 2160  Total Calories (kcal/kg): 2612  % Kcal Needs: 100  Total Protein (gm): 135  % Protein Needs: 100  IV Fluid (mL): 1764  Total Fluid Intake (mL): 1758  Energy Calories Required: meeting needs  Protein Required: meeting needs  Fluid Required: meeting needs  Tolerance: tolerating  Current Medical Diagnosis and Past Medical History  Diagnosis: gastrointestinal disease, infection/sepsis  Past Medical History:   Diagnosis Date    Hyperlipidemia     Hypertension      Nutrition/Diet History  Spiritual, Cultural Beliefs, Church Practices, Values that Affect Care: no  Food Allergies: NKFA  Factors Affecting Nutritional Intake: None identified at this time  Lab/Procedures/Meds  Recent Labs   Lab 02/21/22  0824      K 5.1   BUN 81*   CREATININE 3.44*      CALCIUM 8.9        Last A1c: No results found for: HGBA1C  Lab Results   Component Value Date    RBC 2.20 (L) 02/21/2022    HGB 6.5 (L) 02/21/2022    HCT 20.1 (L) 02/21/2022    MCV 91.4 02/21/2022    MCH 29.5 02/21/2022    MCHC 32.3 02/21/2022     Pertinent Labs Reviewed: reviewed  Pertinent Labs Comments: Hct 20.1, BUN 81, Creat 3.44,  Pertinent Medications Reviewed: reviewed  Pertinent Medications Comments: heparin, insulin  Anthropometrics  Temp: 98.7 °F (37.1 °C)  Height Method: Stated  Height: 5'  "11" (180.3 cm)  Height (inches): 71 in  Weight Method: Bed Scale  Weight: 79.1 kg (174 lb 6.1 oz)  Weight (lb): 174.39 lb  Ideal Body Weight (IBW), Male: 172 lb  % Ideal Body Weight, Male (lb): 111 %  BMI (Calculated): 24.3  BMI Grade: 25 - 29.9 - overweight     Nutrition by Nursing  Diet/Nutrition Received: tube feeding     Diet/Feeding Assistance: none  Diet/Feeding Tolerance: good  Last Bowel Movement: 02/22/22       NG/OG Tube [place] sump 18 Fr. Left nostril-Feeding Type: continuous, by pump       NG/OG Tube [place] sump 18 Fr. Left nostril-Current Rate (mL/hr): 75 mL/hr       NG/OG Tube [place] sump 18 Fr. Left nostril-Goal Rate (mL/hr): 75 mL/hr       NG/OG Tube [place] sump 18 Fr. Left nostril-Formula Name: vital af  Nutrition Follow-Up  RD Follow-up?: Yes  Assessment and Plan  No new Assessment & Plan notes have been filed under this hospital service since the last note was generated.  Service: Nutrition     "

## 2022-02-22 NOTE — PROGRESS NOTES
[facility] [place] [facility]""  Pulmonology  Progress Note    Patient Name: Og Garcia  MRN: [id number]  Admission Date: 12/23/2021  Hospital Length of Stay: 61 days  Code Status: Prior  Attending Provider: Cecil Abernathy DO  Primary Care Provider: Hector Arndt DNP, FNP-C   Principal Problem: Denice gangrene    Subjective:     Interval History: No acute events overnight. Currently hemodynamically stable. Oxygenating adequately on the ventilator. Currently afebrile.     Objective:     Vital Signs (Most Recent):  Temp: 98.7 °F (37.1 °C) (02/22/22 0800)  Pulse: 98 (02/22/22 0800)  Resp: 14 (02/22/22 0800)  BP: (!) 118/57 (02/22/22 0800)  SpO2: 100 % (02/22/22 0800) Vital Signs (24h Range):  Temp:  [96.8 °F (36 °C)-98.7 °F (37.1 °C)] 98.7 °F (37.1 °C)  Pulse:  [] 98  Resp:  [13-28] 14  SpO2:  [94 %-100 %] 100 %  BP: ()/(49-79) 118/57     Weight: 79.1 kg (174 lb 6.1 oz)  Body mass index is 24.32 kg/m².      Intake/Output Summary (Last 24 hours) at 2/22/2022 1036  Last data filed at 2/22/2022 0340  Gross per 24 hour   Intake 900 ml   Output 2600 ml   Net -1700 ml         Physical Exam  Vitals reviewed.   Constitutional:       General: He is not in acute distress.     Appearance: He is not ill-appearing.      Comments: Chronically ill appearing   HENT:      Head: Normocephalic and atraumatic.      Right Ear: External ear normal.      Left Ear: External ear normal.      Nose: Nose normal.      Mouth/Throat:      Mouth: Mucous membranes are moist.      Comments: trach  Eyes:      Conjunctiva/sclera: Conjunctivae normal.      Pupils: Pupils are equal, round, and reactive to light.   Cardiovascular:      Rate and Rhythm: Regular rhythm. Tachycardia present.      Pulses: Normal pulses.      Heart sounds: Normal heart sounds.   Pulmonary:      Effort: No respiratory distress.      Comments: clear breath sounds anteriorly  Abdominal:      Palpations: Abdomen is soft.   Musculoskeletal:          General: Normal range of motion.      Cervical back: Neck supple.   Lymphadenopathy:      Cervical: No cervical adenopathy.   Skin:     General: Skin is warm and dry.      Coloration: Skin is not pale.   Neurological:      Mental Status: Mental status is at baseline.      Comments: Remains on mechanical ventilation. Will track at times   Psychiatric:         Behavior: Behavior normal.       Vents:  Vent Mode: SIMV (02/22/22 0741)  Set Rate: 16 BPM (02/22/22 0346)  Vt Set: 480 mL (02/22/22 0741)  Pressure Support: 15 cmH20 (02/22/22 0741)  PEEP/CPAP: 5 cmH20 (02/22/22 0741)  Oxygen Concentration (%): 40 (02/22/22 0807)  Peak Airway Pressure: 19 cmH2O (02/22/22 0741)  Total Ve: 8.3 mL (02/22/22 0741)  F/VT Ratio<105 (RSBI): (!) 45.83 (02/21/22 1500)    Lines/Drains/Airways       Peripherally Inserted Central Catheter Line  Duration             PICC Double Lumen 01/05/22 left basilic 48 days              Central Venous Catheter Line  Duration                  Hemodialysis Catheter 12/30/21 0900 right internal jugular 54 days              Drain  Duration                  NG/OG Tube [place] sump 18 Fr. Left nostril -- days         Colostomy 12/18/21 1030 Descending/sigmoid LUQ 66 days              Airway  Duration                  Surgical Airway 01/04/22 Shiley 49 days                    Significant Labs:    CBC/Anemia Profile:  Recent Labs   Lab 02/21/22  0824   WBC 18.47*   HGB 6.5*   HCT 20.1*   *   MCV 91.4   RDW 14.6*        Chemistries:  Recent Labs   Lab 02/21/22  0824      K 5.1      CO2 24   BUN 81*   CREATININE 3.44*   CALCIUM 8.9       All pertinent labs within the past 24 hours have been reviewed.    Significant Imaging:  I have reviewed all pertinent imaging results/findings within the past 24 hours.    Assessment/Plan:     * Denice gangrene  - s/p multiple debridements  - wound vac in place  - ID consulted for antibiotic management: He was treated with rocephin, daptomycin, clindamycin.  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 Cazares  2/22- back to OR yesterday for skin grafting    Disseminated mucormycosis  Defer to Dr. Hall on all antibiotic choices---Her note has been reviewed and is appreciated.     Amphotericin until 02/03   zosyn and gent for pseudomonal double coverage until 02/01 2/4 treatment completed    Type 2 diabetes mellitus without complication, without long-term current use of insulin  - continue ISS  - glucose stable/controlled      Acute blood loss anemia  - H/H down to 5.5/16 - pt had bleeding from his HD insertion site yesterday this has now resolved.   - will transfuse 2 units PRBCs on HD  1/2- 9.7/27.5 - repeat CBC in AM  1/4- Hgb is 7.2. Not sure if he will be transfused during surgery. IF not we will try to get  Him transfused with next HD  01/06/2021 transfuse 1 unit of packed red blood cells today  01/10/2021 needs a unit of blood  1/11- repeat H&H in am. Continue to transfuse as needed.  1/13- Hgb is 5. Transfusing today. Post transfusion H&H  1/14- CBC pending for today  1/15- 6/18 - hemodynamically stable, recheck in AM   1/16-- 6.1/17.1 - critical low blood shortage in hospital - holding transfusion until next HD since he is hemodynamically stable and not actively bleeding   01/17/2021 day transfuse blood on dialysis today  01/18/2020 to hemoglobin up to 9 after transfusion  01/25/2022 no signs of bleeding most recent hemoglobin 8  01/29/2022 patient will need transfusion today  1/31- Hgb 6.7 yesterday. Can transfuse with next HD session  2/2- H&H is pending  2/3- plan to transfuse 1 unit of PRBC's  2/4 Hgb is 6.9 today. We will transfuse another unit today  2/7  8.4 now  2/14- 7.9 today  2/15- oozing some today. We are going to hold heparin today. Continue to monitor H&H and transfuse as needed  INR 1.22. Oozing has improved  2/22- Hgb 6.5 on 2/21. Will transfuse 1 unit with next HD    RAHAT (acute kidney injury)  - tunneled HD line placement 12/30  - Continue with dialysis per nephrology    On mechanically assisted ventilation  - 12/14 intubated, tracheostomy 1/4  - Increase weaning trials, continues to have periods of apnea reported by RT on SIMV trials  - He is doing ok with current trials    Hypertension  - continue diltiazem  - bp is ok                 Cecil Abernathy, DO  Pulmonology  [facility]

## 2022-02-22 NOTE — PROGRESS NOTES
[facility]   Wound Care  Progress Note    Patient Name: Og Garcia  MRN: [id number]  Admission Date: 12/23/2021  Hospital Length of Stay: 61 days  Attending Physician: Cecil Abernathy DO  Primary Care Provider: Hector Arndt DNP, FNP-C    Chief Complaint: necrotizing fasciitis     Subjective:     HPI:  Og Garcia is a 66 y.o. male with wounds to abdomen, sacral, left lateral lower leg, right anterior foot, right hand, Left posterior heel, and right lateral and medial foot. Patient was taken to surgery yesterday for skin graft to abdomen. He has wound vac in place. During assessment, wound vac leaking and had moderate amount of malodorous drainage. Notified Dr. Cazares about issues with wound vac and removed vac. Skin graft remained CDI, replaced with petroleum gauze and dry dressing  Wounds are progressing slowly. Sacral noted to have increased slough. Pertinent factors that delay wound healing include immobility, multiple co-morbidities, poor vascular supply, excessive wound moisture and macerated tissue, decreased granulation tissue, necrosis, large surface area, large volume, no protective sensation and infection.            Review of Systems   Unable to perform ROS: Acuity of condition     Objective:     Vital Signs (Most Recent):  Temp: 98.7 °F (37.1 °C) (02/22/22 0800)  Pulse: 98 (02/22/22 0800)  Resp: 14 (02/22/22 0800)  BP: (!) 118/57 (02/22/22 0800)  SpO2: 100 % (02/22/22 0800) Vital Signs (24h Range):  Temp:  [96.8 °F (36 °C)-98.7 °F (37.1 °C)] 98.7 °F (37.1 °C)  Pulse:  [] 98  Resp:  [13-28] 14  SpO2:  [94 %-100 %] 100 %  BP: ()/(54-79) 118/57     Weight: 79.1 kg (174 lb 6.1 oz)  Body mass index is 24.32 kg/m².    Physical Exam  Vitals reviewed.   Constitutional:       Appearance: He is ill-appearing.   HENT:      Head: Normocephalic.   Cardiovascular:      Rate and Rhythm: Regular rhythm. Tachycardia present.      Heart sounds: Normal heart sounds.   Pulmonary:       Effort: Pulmonary effort is normal.      Breath sounds: Normal breath sounds.   Abdominal:      Comments:  Colostomy intact   Musculoskeletal:         General: Swelling present.      Right lower leg: Edema present.      Left lower leg: Edema present.   Skin:     Findings: Erythema present.      Comments: See wound assessment  Skin graft intact to abdomen   Neurological:      Mental Status: He is alert.         Assessment and Plan      Assessment:  1. Abdominal wound/Skin graft  2. Right thigh donor site  3. Sacral wound  4.  Lower extremity wounds    Plan:   1. Abdomen-removed wound vac and petroleum gauze. Cleaned area around wound and applied petroleum gauze and dry dressing.   2. Donor site - CDI, leave petroleum gauze in place.   3. Apply silvadene to all other wounds.       Signature:  HERSON Viveros    Wound care  Date of encounter: 2/22/2022

## 2022-02-22 NOTE — PLAN OF CARE
Problem: Adult Inpatient Plan of Care  Goal: Plan of Care Review  Outcome: Ongoing, Progressing  Goal: Patient-Specific Goal (Individualized)  Outcome: Ongoing, Progressing  Goal: Absence of Hospital-Acquired Illness or Injury  Outcome: Ongoing, Progressing  Goal: Optimal Comfort and Wellbeing  Outcome: Ongoing, Progressing  Goal: Readiness for Transition of Care  Outcome: Ongoing, Progressing     Problem: Adjustment to Illness (Sepsis/Septic Shock)  Goal: Optimal Coping  Outcome: Ongoing, Progressing     Problem: Bleeding (Sepsis/Septic Shock)  Goal: Absence of Bleeding  Outcome: Ongoing, Progressing     Problem: Glycemic Control Impaired (Sepsis/Septic Shock)  Goal: Blood Glucose Level Within Desired Range  Outcome: Ongoing, Progressing     Problem: Infection Progression (Sepsis/Septic Shock)  Goal: Absence of Infection Signs and Symptoms  Outcome: Ongoing, Progressing     Problem: Nutrition Impaired (Sepsis/Septic Shock)  Goal: Optimal Nutrition Intake  Outcome: Ongoing, Progressing     Problem: Fluid and Electrolyte Imbalance (Acute Kidney Injury/Impairment)  Goal: Fluid and Electrolyte Balance  Outcome: Ongoing, Progressing     Problem: Oral Intake Inadequate (Acute Kidney Injury/Impairment)  Goal: Optimal Nutrition Intake  Outcome: Ongoing, Progressing     Problem: Renal Function Impairment (Acute Kidney Injury/Impairment)  Goal: Effective Renal Function  Outcome: Ongoing, Progressing     Problem: Infection  Goal: Absence of Infection Signs and Symptoms  Outcome: Ongoing, Progressing     Problem: Fall Injury Risk  Goal: Absence of Fall and Fall-Related Injury  Outcome: Ongoing, Progressing     Problem: Communication Impairment (Mechanical Ventilation, Invasive)  Goal: Effective Communication  Outcome: Ongoing, Progressing     Problem: Inability to Wean (Mechanical Ventilation, Invasive)  Goal: Mechanical Ventilation Liberation  Outcome: Ongoing, Progressing     Problem: Nutrition Impairment (Mechanical  Ventilation, Invasive)  Goal: Optimal Nutrition Delivery  Outcome: Ongoing, Progressing     Problem: Skin and Tissue Injury (Mechanical Ventilation, Invasive)  Goal: Absence of Device-Related Skin and Tissue Injury  Outcome: Ongoing, Progressing     Problem: Ventilator-Induced Lung Injury (Mechanical Ventilation, Invasive)  Goal: Absence of Ventilator-Induced Lung Injury  Outcome: Ongoing, Progressing     Problem: Communication Impairment (Artificial Airway)  Goal: Effective Communication  Outcome: Ongoing, Progressing     Problem: Device-Related Complication Risk (Artificial Airway)  Goal: Optimal Device Function  Outcome: Ongoing, Progressing     Problem: Skin and Tissue Injury (Artificial Airway)  Goal: Absence of Device-Related Skin or Tissue Injury  Outcome: Ongoing, Progressing     Problem: Noninvasive Ventilation Acute  Goal: Effective Unassisted Ventilation and Oxygenation  Outcome: Ongoing, Progressing     Problem: Skin Injury Risk Increased  Goal: Skin Health and Integrity  Outcome: Ongoing, Progressing     Problem: Device-Related Complication Risk (Hemodialysis)  Goal: Safe, Effective Therapy Delivery  Outcome: Ongoing, Progressing     Problem: Hemodynamic Instability (Hemodialysis)  Goal: Effective Tissue Perfusion  Outcome: Ongoing, Progressing     Problem: Infection (Hemodialysis)  Goal: Absence of Infection Signs and Symptoms  Outcome: Ongoing, Progressing

## 2022-02-22 NOTE — ASSESSMENT & PLAN NOTE
- 12/14 intubated, tracheostomy 1/4  - Increase weaning trials, continues to have periods of apnea reported by RT on SIMV trials  - He is doing ok with current trials

## 2022-02-23 PROBLEM — N18.6 ESRD (END STAGE RENAL DISEASE): Status: ACTIVE | Noted: 2022-01-01

## 2022-02-23 NOTE — SUBJECTIVE & OBJECTIVE
Interval History: No acute events overnight. Currently hemodynamically stable. Oxygenating adequately on the ventilator. Currently afebrile.     Objective:     Vital Signs (Most Recent):  Temp: 98.9 °F (37.2 °C) (02/23/22 1215)  Pulse: 100 (02/23/22 1215)  Resp: (!) 31 (02/23/22 1215)  BP: (!) 118/58 (02/23/22 1215)  SpO2: 100 % (02/23/22 1215) Vital Signs (24h Range):  Temp:  [98.8 °F (37.1 °C)-99.5 °F (37.5 °C)] 98.9 °F (37.2 °C)  Pulse:  [] 100  Resp:  [16-36] 31  SpO2:  [97 %-100 %] 100 %  BP: ()/(54-77) 118/58     Weight: 79.1 kg (174 lb 6.1 oz)  Body mass index is 24.32 kg/m².      Intake/Output Summary (Last 24 hours) at 2/23/2022 1329  Last data filed at 2/23/2022 0000  Gross per 24 hour   Intake 1075 ml   Output --   Net 1075 ml         Physical Exam  Vitals reviewed.   Constitutional:       General: He is not in acute distress.     Appearance: He is not ill-appearing.      Comments: Chronically ill appearing   HENT:      Head: Normocephalic and atraumatic.      Right Ear: External ear normal.      Left Ear: External ear normal.      Nose: Nose normal.      Mouth/Throat:      Mouth: Mucous membranes are moist.      Comments: trach  Eyes:      Conjunctiva/sclera: Conjunctivae normal.      Pupils: Pupils are equal, round, and reactive to light.   Cardiovascular:      Rate and Rhythm: Regular rhythm. Tachycardia present.      Pulses: Normal pulses.      Heart sounds: Normal heart sounds.   Pulmonary:      Effort: No respiratory distress.      Comments: clear breath sounds anteriorly  Abdominal:      Palpations: Abdomen is soft.   Musculoskeletal:         General: Normal range of motion.      Cervical back: Neck supple.   Lymphadenopathy:      Cervical: No cervical adenopathy.   Skin:     General: Skin is warm and dry.      Coloration: Skin is not pale.   Neurological:      Mental Status: Mental status is at baseline.      Comments: Moves spontaneously, tracks with good eye   Psychiatric:          Behavior: Behavior normal.       Vents:  Vent Mode: A/C (02/23/22 0720)  Set Rate: 2 BPM (02/23/22 0418)  Vt Set: 480 mL (02/23/22 0720)  Pressure Support: 12 cmH20 (02/23/22 0418)  PEEP/CPAP: 5 cmH20 (02/23/22 0720)  Oxygen Concentration (%): 35 (02/23/22 0720)  Peak Airway Pressure: 14 cmH2O (02/23/22 0720)  Total Ve: 10.2 mL (02/23/22 0720)  F/VT Ratio<105 (RSBI): (!) 45.83 (02/21/22 1500)    Lines/Drains/Airways       Peripherally Inserted Central Catheter Line  Duration             PICC Double Lumen 01/05/22 left basilic 49 days              Central Venous Catheter Line  Duration                  Hemodialysis Catheter 12/30/21 0900 right internal jugular 55 days              Drain  Duration                  NG/OG Tube [place] sump 18 Fr. Left nostril -- days         Colostomy 12/18/21 1030 Descending/sigmoid LUQ 67 days    Male External Urinary Catheter 02/21/22 Medium 2 days              Airway  Duration                  Surgical Airway 01/04/22 Shiley 50 days                    Significant Labs:    CBC/Anemia Profile:  No results for input(s): WBC, HGB, HCT, PLT, MCV, RDW, IRON, FERRITIN, RETIC, FOLATE, OBCQZSRY21, OCCULTBLOOD in the last 48 hours.       Chemistries:  No results for input(s): NA, K, CL, CO2, BUN, CREATININE, CALCIUM, ALBUMIN, PROT, BILITOT, ALKPHOS, ALT, AST, GLUCOSE, MG, PHOS in the last 48 hours.      All pertinent labs within the past 24 hours have been reviewed.    Significant Imaging:  I have reviewed all pertinent imaging results/findings within the past 24 hours.

## 2022-02-23 NOTE — SUBJECTIVE & OBJECTIVE
Interval History: The patient did well with dialysis today.  No acute changes.    Review of patient's allergies indicates:  No Known Allergies  Current Facility-Administered Medications   Medication Frequency    0.9%  NaCl infusion (for blood administration) Q24H PRN    0.9%  NaCl infusion PRN    acetaminophen tablet 500 mg Q6H PRN    albuterol nebulizer solution 2.5 mg Q4H PRN    bisacodyL EC tablet 10 mg Daily PRN    calcium gluconate 1 g in dextrose 5 % in water (D5W) 5 % 100 mL IVPB (MB+) Once    dextromethorphan-guaiFENesin  mg/5 ml liquid 10 mL Q6H PRN    dextrose 50% injection 12.5 g PRN    diltiaZEM tablet 60 mg Q6H    fentaNYL 50 mcg/hr 1 patch Q72H    glucagon (human recombinant) injection 1 mg PRN    heparin (porcine) injection 4,000 Units PRN    insulin aspart U-100 injection 1-10 Units Q6H    lorazepam injection 2 mg Q6H PRN    ondansetron injection 8 mg Q6H PRN    pantoprazole suspension 40 mg Daily    sevelamer carbonate pwpk 0.8 g TID WM    silver sulfADIAZINE 1% cream PRN    simethicone chewable tablet 80 mg TID PRN    ticagrelor tablet 90 mg BID       Objective:     Vital Signs (Most Recent):  Temp: 98.9 °F (37.2 °C) (02/23/22 1215)  Pulse: 100 (02/23/22 1215)  Resp: (!) 31 (02/23/22 1215)  BP: (!) 118/58 (02/23/22 1215)  SpO2: 100 % (02/23/22 1215)  O2 Device (Oxygen Therapy): ventilator (02/23/22 0720)   Vital Signs (24h Range):  Temp:  [98.8 °F (37.1 °C)-99.5 °F (37.5 °C)] 98.9 °F (37.2 °C)  Pulse:  [] 100  Resp:  [16-36] 31  SpO2:  [97 %-100 %] 100 %  BP: ()/(54-77) 118/58     Weight: 79.1 kg (174 lb 6.1 oz) (02/22/22 0400)  Body mass index is 24.32 kg/m².  Body surface area is 1.99 meters squared.    I/O last 3 completed shifts:  In: 1225 [NG/GT:1225]  Out: 200 [Stool:200]    Physical Exam  Vitals reviewed.   HENT:      Head: Atraumatic.   Cardiovascular:      Rate and Rhythm: Regular rhythm.   Pulmonary:      Breath sounds: Normal breath sounds.   Abdominal:      Palpations:  Abdomen is soft.   Neurological:      Mental Status: He is alert.       Significant Labs:  BMP:   Recent Labs   Lab 02/21/22  0824         K 5.1      CO2 24   BUN 81*   CREATININE 3.44*   CALCIUM 8.9     CBC:   Recent Labs   Lab 02/21/22  0824   WBC 18.47*   RBC 2.20*   HGB 6.5*   HCT 20.1*   *   MCV 91.4   MCH 29.5   MCHC 32.3        Significant Imaging:  Labs: Reviewed

## 2022-02-23 NOTE — PROGRESS NOTES
[facility]  Nephrology  Progress Note    Patient Name: Og Garcia  MRN: [id number]  Admission Date: 12/23/2021  Hospital Length of Stay: 62 days  Attending Provider: Cecil Abernathy DO   Primary Care Physician: Hector Arndt DNP, FNP-C  Principal Problem:Denice gangrene    Subjective:     HPI: The patient is known from the previous nephrology consult at [facility].  He is no at Specialty and continues to need dialysis support.      Interval History: The patient did well with dialysis today.  No acute changes.    Review of patient's allergies indicates:  No Known Allergies  Current Facility-Administered Medications   Medication Frequency    0.9%  NaCl infusion (for blood administration) Q24H PRN    0.9%  NaCl infusion PRN    acetaminophen tablet 500 mg Q6H PRN    albuterol nebulizer solution 2.5 mg Q4H PRN    bisacodyL EC tablet 10 mg Daily PRN    calcium gluconate 1 g in dextrose 5 % in water (D5W) 5 % 100 mL IVPB (MB+) Once    dextromethorphan-guaiFENesin  mg/5 ml liquid 10 mL Q6H PRN    dextrose 50% injection 12.5 g PRN    diltiaZEM tablet 60 mg Q6H    fentaNYL 50 mcg/hr 1 patch Q72H    glucagon (human recombinant) injection 1 mg PRN    heparin (porcine) injection 4,000 Units PRN    insulin aspart U-100 injection 1-10 Units Q6H    lorazepam injection 2 mg Q6H PRN    ondansetron injection 8 mg Q6H PRN    pantoprazole suspension 40 mg Daily    sevelamer carbonate pwpk 0.8 g TID WM    silver sulfADIAZINE 1% cream PRN    simethicone chewable tablet 80 mg TID PRN    ticagrelor tablet 90 mg BID       Objective:     Vital Signs (Most Recent):  Temp: 98.9 °F (37.2 °C) (02/23/22 1215)  Pulse: 100 (02/23/22 1215)  Resp: (!) 31 (02/23/22 1215)  BP: (!) 118/58 (02/23/22 1215)  SpO2: 100 % (02/23/22 1215)  O2 Device (Oxygen Therapy): ventilator (02/23/22 0720)   Vital Signs (24h Range):  Temp:  [98.8 °F (37.1 °C)-99.5 °F (37.5 °C)] 98.9 °F (37.2 °C)  Pulse:  []  100  Resp:  [16-36] 31  SpO2:  [97 %-100 %] 100 %  BP: ()/(54-77) 118/58     Weight: 79.1 kg (174 lb 6.1 oz) (02/22/22 0400)  Body mass index is 24.32 kg/m².  Body surface area is 1.99 meters squared.    I/O last 3 completed shifts:  In: 1225 [NG/GT:1225]  Out: 200 [Stool:200]    Physical Exam  Vitals reviewed.   HENT:      Head: Atraumatic.   Cardiovascular:      Rate and Rhythm: Regular rhythm.   Pulmonary:      Breath sounds: Normal breath sounds.   Abdominal:      Palpations: Abdomen is soft.   Neurological:      Mental Status: He is alert.       Significant Labs:  BMP:   Recent Labs   Lab 02/21/22  0824         K 5.1      CO2 24   BUN 81*   CREATININE 3.44*   CALCIUM 8.9     CBC:   Recent Labs   Lab 02/21/22  0824   WBC 18.47*   RBC 2.20*   HGB 6.5*   HCT 20.1*   *   MCV 91.4   MCH 29.5   MCHC 32.3        Significant Imaging:  Labs: Reviewed    Assessment/Plan:     RAHAT (acute kidney injury)  Dialyzed yesterday  1/31/2022 Continue with scheduled hemodialysis.  2/1/2022 Continue with dialysis for this patient.  2/2/2022 Dialysis sessions are going well.  2/3/2022 Dialysis as scheduled for this patient.  2/4/2022 The patient is doing well with dialysis.  Metabolics are stable.  2/8/2022 Continue with dialysis for this patient.  2/14/2022 Continue with scheduled hemodialysis for this patient.  2/15/2022 Dialysis as scheduled for this patient.  2/16/2022 Continue dialysis.  2/17/2022 The condition is the same.    2/18/2022 Continue with dialysis for this patient.  2/22/2022 The patient continues to do well with dialysis.      ESRD.  Thank you for your consult. I will follow-up with patient. Please contact us if you have any additional questions.    Edwin Pryor Jr, MD  Nephrology  [facility]

## 2022-02-23 NOTE — ASSESSMENT & PLAN NOTE
- 12/14 intubated, tracheostomy 1/4  - Increase weaning trials, continues to have periods of apnea reported by RT on SIMV trials  - He is doing ok with current trials  - I am going to try him on PSV and see how he will do

## 2022-02-23 NOTE — PROGRESS NOTES
[facility]  Pulmonology  Progress Note    Patient Name: Og Garcia  MRN: [id number]  Admission Date: 12/23/2021  Hospital Length of Stay: 62 days  Code Status: Prior  Attending Provider: Cecil Abernathy DO  Primary Care Provider: Hector Arndt DNP, FNP-C   Principal Problem: Denice gangrene    Subjective:     Interval History: No acute events overnight. Currently hemodynamically stable. Oxygenating adequately on the ventilator. Currently afebrile.     Objective:     Vital Signs (Most Recent):  Temp: 98.9 °F (37.2 °C) (02/23/22 1215)  Pulse: 100 (02/23/22 1215)  Resp: (!) 31 (02/23/22 1215)  BP: (!) 118/58 (02/23/22 1215)  SpO2: 100 % (02/23/22 1215) Vital Signs (24h Range):  Temp:  [98.8 °F (37.1 °C)-99.5 °F (37.5 °C)] 98.9 °F (37.2 °C)  Pulse:  [] 100  Resp:  [16-36] 31  SpO2:  [97 %-100 %] 100 %  BP: ()/(54-77) 118/58     Weight: 79.1 kg (174 lb 6.1 oz)  Body mass index is 24.32 kg/m².      Intake/Output Summary (Last 24 hours) at 2/23/2022 1329  Last data filed at 2/23/2022 0000  Gross per 24 hour   Intake 1075 ml   Output --   Net 1075 ml         Physical Exam  Vitals reviewed.   Constitutional:       General: He is not in acute distress.     Appearance: He is not ill-appearing.      Comments: Chronically ill appearing   HENT:      Head: Normocephalic and atraumatic.      Right Ear: External ear normal.      Left Ear: External ear normal.      Nose: Nose normal.      Mouth/Throat:      Mouth: Mucous membranes are moist.      Comments: trach  Eyes:      Conjunctiva/sclera: Conjunctivae normal.      Pupils: Pupils are equal, round, and reactive to light.   Cardiovascular:      Rate and Rhythm: Regular rhythm. Tachycardia present.      Pulses: Normal pulses.      Heart sounds: Normal heart sounds.   Pulmonary:      Effort: No respiratory distress.      Comments: clear breath sounds anteriorly  Abdominal:      Palpations: Abdomen is soft.   Musculoskeletal:          General: Normal range of motion.      Cervical back: Neck supple.   Lymphadenopathy:      Cervical: No cervical adenopathy.   Skin:     General: Skin is warm and dry.      Coloration: Skin is not pale.   Neurological:      Mental Status: Mental status is at baseline.      Comments: Moves spontaneously, tracks with good eye   Psychiatric:         Behavior: Behavior normal.       Vents:  Vent Mode: A/C (02/23/22 0720)  Set Rate: 2 BPM (02/23/22 0418)  Vt Set: 480 mL (02/23/22 0720)  Pressure Support: 12 cmH20 (02/23/22 0418)  PEEP/CPAP: 5 cmH20 (02/23/22 0720)  Oxygen Concentration (%): 35 (02/23/22 0720)  Peak Airway Pressure: 14 cmH2O (02/23/22 0720)  Total Ve: 10.2 mL (02/23/22 0720)  F/VT Ratio<105 (RSBI): (!) 45.83 (02/21/22 1500)    Lines/Drains/Airways       Peripherally Inserted Central Catheter Line  Duration             PICC Double Lumen 01/05/22 left basilic 49 days              Central Venous Catheter Line  Duration                  Hemodialysis Catheter 12/30/21 0900 right internal jugular 55 days              Drain  Duration                  NG/OG Tube [place] sump 18 Fr. Left nostril -- days         Colostomy 12/18/21 1030 Descending/sigmoid LUQ 67 days    Male External Urinary Catheter 02/21/22 Medium 2 days              Airway  Duration                  Surgical Airway 01/04/22 Shiley 50 days                    Significant Labs:    CBC/Anemia Profile:  No results for input(s): WBC, HGB, HCT, PLT, MCV, RDW, IRON, FERRITIN, RETIC, FOLATE, [id number], OCCULTBLOOD in the last 48 hours.       Chemistries:  No results for input(s): NA, K, CL, CO2, BUN, CREATININE, CALCIUM, ALBUMIN, PROT, BILITOT, ALKPHOS, ALT, AST, GLUCOSE, MG, PHOS in the last 48 hours.      All pertinent labs within the past 24 hours have been reviewed.    Significant Imaging:  I have reviewed all pertinent imaging results/findings within the past 24 hours.    Assessment/Plan:     * Denice gangrene  - s/p multiple debridements  - wound  vac in place  - ID consulted for antibiotic management: He was treated with rocephin, daptomycin, clindamycin. 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 Cazares  2/22- back to OR yesterday for skin grafting    Disseminated mucormycosis  Defer to Dr. Hall on all antibiotic choices---Her note has been reviewed and is appreciated.     Amphotericin until 02/03   zosyn and gent for pseudomonal double coverage until 02/01 2/4 treatment completed    Type 2 diabetes mellitus without complication, without long-term current use of insulin  - continue ISS  - glucose stable/controlled      Acute blood loss anemia  - H/H down to 5.5/16 - pt had bleeding from his HD insertion site yesterday this has now resolved.   - will transfuse 2 units PRBCs on HD  1/2- 9.7/27.5 - repeat CBC in AM  1/4- Hgb is 7.2. Not sure if he will be transfused during surgery. IF not we will try to get  Him transfused with next HD  01/06/2021 transfuse 1 unit of packed red blood cells today  01/10/2021 needs a unit of blood  1/11- repeat H&H in am. Continue to transfuse as needed.  1/13- Hgb is 5. Transfusing today. Post transfusion H&H  1/14- CBC pending for today  1/15- 6/18 - hemodynamically stable, recheck in AM   1/16-- 6.1/17.1 - critical low blood shortage in hospital - holding transfusion until next HD since he is hemodynamically stable and not actively bleeding   01/17/2021 day transfuse blood on dialysis today  01/18/2020 to hemoglobin up to 9 after transfusion  01/25/2022 no signs of bleeding most recent hemoglobin 8  01/29/2022 patient will need transfusion today  1/31- Hgb 6.7 yesterday. Can transfuse with next HD session  2/2- H&H is  pending  2/3- plan to transfuse 1 unit of PRBC's  2/4 Hgb is 6.9 today. We will transfuse another unit today  2/7 8.4 now  2/14- 7.9 today  2/15- oozing some today. We are going to hold heparin today. Continue to monitor H&H and transfuse as needed  INR 1.22. Oozing has improved  2/22- Hgb 6.5 on 2/21. Will transfuse 1 unit with next HD    RAHAT (acute kidney injury)  - tunneled HD line placement 12/30  - Continue with dialysis per nephrology    On mechanically assisted ventilation  - 12/14 intubated, tracheostomy 1/4  - Increase weaning trials, continues to have periods of apnea reported by RT on SIMV trials  - He is doing ok with current trials  - I am going to try him on PSV and see how he will do    Hypertension  - continue diltiazem  - bp is ok                 Cecil Abernathy, DO  Pulmonology  [facility]

## 2022-02-24 NOTE — ASSESSMENT & PLAN NOTE
- H/H down to 5.5/16 - pt had bleeding from his HD insertion site yesterday this has now resolved.   - will transfuse 2 units PRBCs on HD  1/2- 9.7/27.5 - repeat CBC in AM  1/4- Hgb is 7.2. Not sure if he will be transfused during surgery. IF not we will try to get  Him transfused with next HD  01/06/2021 transfuse 1 unit of packed red blood cells today  01/10/2021 needs a unit of blood  1/11- repeat H&H in am. Continue to transfuse as needed.  1/13- Hgb is 5. Transfusing today. Post transfusion H&H  1/14- CBC pending for today  1/15- 6/18 - hemodynamically stable, recheck in AM   1/16-- 6.1/17.1 - critical low blood shortage in hospital - holding transfusion until next HD since he is hemodynamically stable and not actively bleeding   01/17/2021 day transfuse blood on dialysis today  01/18/2020 to hemoglobin up to 9 after transfusion  01/25/2022 no signs of bleeding most recent hemoglobin 8  01/29/2022 patient will need transfusion today  1/31- Hgb 6.7 yesterday. Can transfuse with next HD session  2/2- H&H is pending  2/3- plan to transfuse 1 unit of PRBC's  2/4 Hgb is 6.9 today. We will transfuse another unit today  2/7 8.4 now  2/14- 7.9 today  2/15- oozing some today. We are going to hold heparin today. Continue to monitor H&H and transfuse as needed  INR 1.22. Oozing has improved  2/22- Hgb 6.5 on 2/21. Will transfuse 1 unit with next HD  2/24 repeat cbc in am

## 2022-02-24 NOTE — SUBJECTIVE & OBJECTIVE
Interval History: No acute events overnight. Currently hemodynamically stable. Oxygenating adequately on the ventilator. Currently afebrile.     Objective:     Vital Signs (Most Recent):  Temp: 99.3 °F (37.4 °C) (02/24/22 1115)  Pulse: (!) 121 (02/24/22 1145)  Resp: (!) 37 (02/24/22 1145)  BP: (!) 142/70 (02/24/22 1145)  SpO2: 100 % (02/24/22 1145) Vital Signs (24h Range):  Temp:  [98.9 °F (37.2 °C)-100.2 °F (37.9 °C)] 99.3 °F (37.4 °C)  Pulse:  [] 121  Resp:  [8-39] 37  SpO2:  [97 %-100 %] 100 %  BP: ()/(46-77) 142/70     Weight: 79.1 kg (174 lb 6.1 oz)  Body mass index is 24.32 kg/m².      Intake/Output Summary (Last 24 hours) at 2/24/2022 1159  Last data filed at 2/24/2022 0600  Gross per 24 hour   Intake --   Output 850 ml   Net -850 ml         Physical Exam  Vitals reviewed.   Constitutional:       General: He is not in acute distress.     Appearance: He is not ill-appearing.      Comments: Chronically ill appearing   HENT:      Head: Normocephalic and atraumatic.      Right Ear: External ear normal.      Left Ear: External ear normal.      Nose: Nose normal.      Mouth/Throat:      Mouth: Mucous membranes are moist.      Comments: trach  Eyes:      Conjunctiva/sclera: Conjunctivae normal.      Pupils: Pupils are equal, round, and reactive to light.   Cardiovascular:      Rate and Rhythm: Regular rhythm. Tachycardia present.      Pulses: Normal pulses.      Heart sounds: Normal heart sounds.   Pulmonary:      Effort: No respiratory distress.      Comments: clear breath sounds anteriorly  Abdominal:      Palpations: Abdomen is soft.   Musculoskeletal:         General: Normal range of motion.      Cervical back: Neck supple.   Lymphadenopathy:      Cervical: No cervical adenopathy.   Skin:     General: Skin is warm and dry.      Coloration: Skin is not pale.   Neurological:      Mental Status: Mental status is at baseline.      Comments: Moves spontaneously, tracks with good eye   Psychiatric:          Behavior: Behavior normal.       Vents:  Vent Mode: CPAP (02/24/22 1134)  Set Rate: 16 BPM (02/24/22 0741)  Vt Set: 480 mL (02/24/22 0741)  Pressure Support: 12 cmH20 (02/24/22 1134)  PEEP/CPAP: 5 cmH20 (02/24/22 1134)  Oxygen Concentration (%): 35 (02/24/22 0741)  Peak Airway Pressure: 18 cmH2O (02/24/22 1134)  Total Ve: 8 mL (02/24/22 1134)  F/VT Ratio<105 (RSBI): (!) 89.74 (02/24/22 0500)    Lines/Drains/Airways       Peripherally Inserted Central Catheter Line  Duration             PICC Double Lumen 01/05/22 left basilic 50 days              Central Venous Catheter Line  Duration                  Hemodialysis Catheter 12/30/21 0900 right internal jugular 56 days              Drain  Duration                  NG/OG Tube [place] sump 18 Fr. Left nostril -- days         Colostomy 12/18/21 1030 Descending/sigmoid LUQ 68 days    Male External Urinary Catheter 02/21/22 Medium 3 days              Airway  Duration                  Surgical Airway 01/04/22 Shiley 51 days                    Significant Labs:    CBC/Anemia Profile:  No results for input(s): WBC, HGB, HCT, PLT, MCV, RDW, IRON, FERRITIN, RETIC, FOLATE, [id number], OCCULTBLOOD in the last 48 hours.       Chemistries:  No results for input(s): NA, K, CL, CO2, BUN, CREATININE, CALCIUM, ALBUMIN, PROT, BILITOT, ALKPHOS, ALT, AST, GLUCOSE, MG, PHOS in the last 48 hours.      All pertinent labs within the past 24 hours have been reviewed.    Significant Imaging:  I have reviewed all pertinent imaging results/findings within the past 24 hours.

## 2022-02-24 NOTE — ASSESSMENT & PLAN NOTE
- 12/14 intubated, tracheostomy 1/4  - Increase weaning trials, continues to have periods of apnea reported by RT on SIMV trials  - He is doing ok with current trials  - I am going to try him on PSV and see how he will do  2/24- Placed on CPAP/PSV this am during my exam and the patient is doing well.

## 2022-02-24 NOTE — PROGRESS NOTES
Doctor at bedside, paced patient on CPAP. Verbal order to continue CPAP w/ PS 12  35%+5. Awaiting written order. Patient is tolerating well: , RR 28, O2 100%, /62. Will continue to monitor.    Terminated CPAP due to increased RR that were well into the high 40s, increased WOB, and patient becoming diaphoretic. HR: 122, RR 47, O2 100.    Placed back on prior settings with rate of 16. Patient only completed 4 hours of trial. Will continue to monitor.

## 2022-02-24 NOTE — PROGRESS NOTES
[facility]  Pulmonology  Progress Note    Patient Name: Og Garcia  MRN: [id number]  Admission Date: 12/23/2021  Hospital Length of Stay: 63 days  Code Status: Prior  Attending Provider: Cecil Abernathy DO  Primary Care Provider: Hector Arndt DNP, FNP-C   Principal Problem: Denice gangrene    Subjective:     Interval History: No acute events overnight. Currently hemodynamically stable. Oxygenating adequately on the ventilator. Currently afebrile.     Objective:     Vital Signs (Most Recent):  Temp: 99.3 °F (37.4 °C) (02/24/22 1115)  Pulse: (!) 121 (02/24/22 1145)  Resp: (!) 37 (02/24/22 1145)  BP: (!) 142/70 (02/24/22 1145)  SpO2: 100 % (02/24/22 1145) Vital Signs (24h Range):  Temp:  [98.9 °F (37.2 °C)-100.2 °F (37.9 °C)] 99.3 °F (37.4 °C)  Pulse:  [] 121  Resp:  [8-39] 37  SpO2:  [97 %-100 %] 100 %  BP: ()/(46-77) 142/70     Weight: 79.1 kg (174 lb 6.1 oz)  Body mass index is 24.32 kg/m².      Intake/Output Summary (Last 24 hours) at 2/24/2022 1159  Last data filed at 2/24/2022 0600  Gross per 24 hour   Intake --   Output 850 ml   Net -850 ml         Physical Exam  Vitals reviewed.   Constitutional:       General: He is not in acute distress.     Appearance: He is not ill-appearing.      Comments: Chronically ill appearing   HENT:      Head: Normocephalic and atraumatic.      Right Ear: External ear normal.      Left Ear: External ear normal.      Nose: Nose normal.      Mouth/Throat:      Mouth: Mucous membranes are moist.      Comments: trach  Eyes:      Conjunctiva/sclera: Conjunctivae normal.      Pupils: Pupils are equal, round, and reactive to light.   Cardiovascular:      Rate and Rhythm: Regular rhythm. Tachycardia present.      Pulses: Normal pulses.      Heart sounds: Normal heart sounds.   Pulmonary:      Effort: No respiratory distress.      Comments: clear breath sounds anteriorly  Abdominal:      Palpations: Abdomen is soft.   Musculoskeletal:          General: Normal range of motion.      Cervical back: Neck supple.   Lymphadenopathy:      Cervical: No cervical adenopathy.   Skin:     General: Skin is warm and dry.      Coloration: Skin is not pale.   Neurological:      Mental Status: Mental status is at baseline.      Comments: Moves spontaneously, tracks with good eye   Psychiatric:         Behavior: Behavior normal.       Vents:  Vent Mode: CPAP (02/24/22 1134)  Set Rate: 16 BPM (02/24/22 0741)  Vt Set: 480 mL (02/24/22 0741)  Pressure Support: 12 cmH20 (02/24/22 1134)  PEEP/CPAP: 5 cmH20 (02/24/22 1134)  Oxygen Concentration (%): 35 (02/24/22 0741)  Peak Airway Pressure: 18 cmH2O (02/24/22 1134)  Total Ve: 8 mL (02/24/22 1134)  F/VT Ratio<105 (RSBI): (!) 89.74 (02/24/22 0500)    Lines/Drains/Airways       Peripherally Inserted Central Catheter Line  Duration             PICC Double Lumen 01/05/22 left basilic 50 days              Central Venous Catheter Line  Duration                  Hemodialysis Catheter 12/30/21 0900 right internal jugular 56 days              Drain  Duration                  NG/OG Tube [place] sump 18 Fr. Left nostril -- days         Colostomy 12/18/21 1030 Descending/sigmoid LUQ 68 days    Male External Urinary Catheter 02/21/22 Medium 3 days              Airway  Duration                  Surgical Airway 01/04/22 Shiley 51 days                    Significant Labs:    CBC/Anemia Profile:  No results for input(s): WBC, HGB, HCT, PLT, MCV, RDW, IRON, FERRITIN, RETIC, FOLATE, [id number], OCCULTBLOOD in the last 48 hours.       Chemistries:  No results for input(s): NA, K, CL, CO2, BUN, CREATININE, CALCIUM, ALBUMIN, PROT, BILITOT, ALKPHOS, ALT, AST, GLUCOSE, MG, PHOS in the last 48 hours.      All pertinent labs within the past 24 hours have been reviewed.    Significant Imaging:  I have reviewed all pertinent imaging results/findings within the past 24 hours.    Assessment/Plan:     * Denice gangrene  - s/p multiple debridements  - wound  vac in place  - ID consulted for antibiotic management: He was treated with rocephin, daptomycin, clindamycin. 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 Cazares  2/22- back to OR yesterday for skin grafting    ESRD (end stage renal disease)  Continue with HD per nephrology    Type 2 diabetes mellitus without complication, without long-term current use of insulin  - continue ISS  - glucose stable/controlled      Acute blood loss anemia  - H/H down to 5.5/16 - pt had bleeding from his HD insertion site yesterday this has now resolved.   - will transfuse 2 units PRBCs on HD  1/2- 9.7/27.5 - repeat CBC in AM  1/4- Hgb is 7.2. Not sure if he will be transfused during surgery. IF not we will try to get  Him transfused with next HD  01/06/2021 transfuse 1 unit of packed red blood cells today  01/10/2021 needs a unit of blood  1/11- repeat H&H in am. Continue to transfuse as needed.  1/13- Hgb is 5. Transfusing today. Post transfusion H&H  1/14- CBC pending for today  1/15- 6/18 - hemodynamically stable, recheck in AM   1/16-- 6.1/17.1 - critical low blood shortage in hospital - holding transfusion until next HD since he is hemodynamically stable and not actively bleeding   01/17/2021 day transfuse blood on dialysis today  01/18/2020 to hemoglobin up to 9 after transfusion  01/25/2022 no signs of bleeding most recent hemoglobin 8  01/29/2022 patient will need transfusion today  1/31- Hgb 6.7 yesterday. Can transfuse with next HD session  2/2- H&H is pending  2/3- plan to transfuse 1 unit of PRBC's  2/4 Hgb is 6.9 today. We will transfuse another unit today  2/7 8.4 now  2/14- 7.9 today  2/15- oozing some today. We are going to  hold heparin today. Continue to monitor H&H and transfuse as needed  INR 1.22. Oozing has improved  2/22- Hgb 6.5 on 2/21. Will transfuse 1 unit with next HD  2/24 repeat cbc in am    RAHAT (acute kidney injury)  - tunneled HD line placement 12/30  - Continue with dialysis per nephrology    On mechanically assisted ventilation  - 12/14 intubated, tracheostomy 1/4  - Increase weaning trials, continues to have periods of apnea reported by RT on SIMV trials  - He is doing ok with current trials  - I am going to try him on PSV and see how he will do  2/24- Placed on CPAP/PSV this am during my exam and the patient is doing well.     Hypertension  - continue diltiazem  - bp is ok                 Cecil Abernathy, DO  Pulmonology  [facility]

## 2022-02-25 NOTE — PROGRESS NOTES
[facility]  Nephrology  Progress Note    Patient Name: Og Garcia  MRN: [id number]  Admission Date: 12/23/2021  Hospital Length of Stay: 63 days  Attending Provider: Cecil Abernathy DO   Primary Care Physician: Hector Arndt DNP, FNP-C  Principal Problem:Denice gangrene    Subjective:     HPI: The patient is known from the previous nephrology consult at [facility].  He is no at Specialty and continues to need dialysis support.      Interval History: The patient is doing acceptable.  He has been doing cpap trials today.  No other acute changes.    Review of patient's allergies indicates:  No Known Allergies  Current Facility-Administered Medications   Medication Frequency    0.9%  NaCl infusion (for blood administration) Q24H PRN    0.9%  NaCl infusion PRN    acetaminophen tablet 500 mg Q6H PRN    albuterol nebulizer solution 2.5 mg Q4H PRN    bisacodyL EC tablet 10 mg Daily PRN    calcium gluconate 1 g in dextrose 5 % in water (D5W) 5 % 100 mL IVPB (MB+) Once    dextromethorphan-guaiFENesin  mg/5 ml liquid 10 mL Q6H PRN    dextrose 50% injection 12.5 g PRN    diltiaZEM tablet 60 mg Q6H    fentaNYL 50 mcg/hr 1 patch Q72H    glucagon (human recombinant) injection 1 mg PRN    heparin (porcine) injection 4,000 Units PRN    insulin aspart U-100 injection 1-10 Units Q6H    lorazepam injection 2 mg Q6H PRN    ondansetron injection 8 mg Q6H PRN    pantoprazole suspension 40 mg Daily    sevelamer carbonate pwpk 0.8 g TID WM    silver sulfADIAZINE 1% cream PRN    simethicone chewable tablet 80 mg TID PRN    ticagrelor tablet 90 mg BID       Objective:     Vital Signs (Most Recent):  Temp: 98.8 °F (37.1 °C) (02/24/22 1500)  Pulse: 96 (02/24/22 1800)  Resp: 19 (02/24/22 1800)  BP: 111/60 (02/24/22 1800)  SpO2: 100 % (02/24/22 1800)  O2 Device (Oxygen Therapy): ventilator (02/24/22 1134) Vital Signs (24h Range):  Temp:  [98.8 °F (37.1 °C)-100.2 °F (37.9 °C)] 98.8 °F (37.1  °C)  Pulse:  [] 96  Resp:  [8-39] 19  SpO2:  [100 %] 100 %  BP: (104-142)/(52-77) 111/60     Weight: 79.1 kg (174 lb 6.1 oz) (02/22/22 0400)  Body mass index is 24.32 kg/m².  Body surface area is 1.99 meters squared.    I/O last 3 completed shifts:  In: 100 [NG/GT:100]  Out: 850 [Urine:650; Stool:200]    Physical Exam  Vitals reviewed.   HENT:      Head: Atraumatic.      Mouth/Throat:      Mouth: Mucous membranes are dry.   Cardiovascular:      Rate and Rhythm: Regular rhythm.   Pulmonary:      Effort: Pulmonary effort is normal.   Abdominal:      Palpations: Abdomen is soft.   Neurological:      Mental Status: He is alert.       Significant Labs:  BMP:   Recent Labs   Lab 02/21/22  0824         K 5.1      CO2 24   BUN 81*   CREATININE 3.44*   CALCIUM 8.9     CBC:   Recent Labs   Lab 02/21/22  0824   WBC 18.47*   RBC 2.20*   HGB 6.5*   HCT 20.1*   *   MCV 91.4   MCH 29.5   MCHC 32.3        Significant Imaging:  Labs: Reviewed    Assessment/Plan:     ESRD (end stage renal disease)  Continue with scheduled hemodialysis for this patient.  2/24/2021  The patient is doing well with dialysis.  Continue with scheduled hemodialysis for this patient.        Thank you for your consult. I will follow-up with patient. Please contact us if you have any additional questions.    Edwin Pryor Jr, MD  Nephrology  [facility]

## 2022-02-25 NOTE — SUBJECTIVE & OBJECTIVE
Interval History: No acute events overnight. Currently hemodynamically stable. Oxygenating adequately on the ventilator. Currently afebrile.     Objective:     Vital Signs (Most Recent):  Temp: 98.9 °F (37.2 °C) (02/25/22 0400)  Pulse: 102 (02/25/22 0845)  Resp: (!) 34 (02/25/22 0945)  BP: 136/68 (02/25/22 0945)  SpO2: 100 % (02/25/22 0945) Vital Signs (24h Range):  Temp:  [97.8 °F (36.6 °C)-99.3 °F (37.4 °C)] 98.9 °F (37.2 °C)  Pulse:  [] 102  Resp:  [13-39] 34  SpO2:  [100 %] 100 %  BP: (109-142)/(57-77) 136/68     Weight: 79.7 kg (175 lb 11.3 oz)  Body mass index is 24.51 kg/m².      Intake/Output Summary (Last 24 hours) at 2/25/2022 0953  Last data filed at 2/25/2022 0500  Gross per 24 hour   Intake 1220 ml   Output 275 ml   Net 945 ml         Physical Exam  Vitals reviewed.   Constitutional:       General: He is not in acute distress.     Appearance: He is not ill-appearing.      Comments: Chronically ill appearing   HENT:      Head: Normocephalic and atraumatic.      Right Ear: External ear normal.      Left Ear: External ear normal.      Nose: Nose normal.      Mouth/Throat:      Mouth: Mucous membranes are moist.      Comments: trach  Eyes:      Conjunctiva/sclera: Conjunctivae normal.      Pupils: Pupils are equal, round, and reactive to light.   Cardiovascular:      Rate and Rhythm: Regular rhythm. Tachycardia present.      Pulses: Normal pulses.      Heart sounds: Normal heart sounds.   Pulmonary:      Effort: No respiratory distress.      Comments: clear breath sounds anteriorly  Abdominal:      Palpations: Abdomen is soft.   Musculoskeletal:         General: Normal range of motion.      Cervical back: Neck supple.   Lymphadenopathy:      Cervical: No cervical adenopathy.   Skin:     General: Skin is warm and dry.      Coloration: Skin is not pale.   Neurological:      Mental Status: Mental status is at baseline.      Comments: Moves spontaneously, tracks with good eye   Psychiatric:          Behavior: Behavior normal.       Vents:  Vent Mode: A/C (02/25/22 0700)  Set Rate: 16 BPM (02/25/22 0700)  Vt Set: 480 mL (02/25/22 0700)  Pressure Support: 12 cmH20 (02/25/22 0024)  PEEP/CPAP: 5 cmH20 (02/25/22 0700)  Oxygen Concentration (%): 31 (02/25/22 0825)  Peak Airway Pressure: 17 cmH2O (02/25/22 0700)  Total Ve: 7.7 mL (02/25/22 0700)  F/VT Ratio<105 (RSBI): (!) 89.74 (02/24/22 0500)    Lines/Drains/Airways       Peripherally Inserted Central Catheter Line  Duration             PICC Double Lumen 01/05/22 left basilic 51 days              Central Venous Catheter Line  Duration                  Hemodialysis Catheter 12/30/21 0900 right internal jugular 57 days              Drain  Duration                  NG/OG Tube [place] sump 18 Fr. Left nostril -- days         Colostomy 12/18/21 1030 Descending/sigmoid LUQ 68 days    Male External Urinary Catheter 02/21/22 Medium 4 days              Airway  Duration                  Surgical Airway 01/04/22 Shiley 52 days                    Significant Labs:    CBC/Anemia Profile:  No results for input(s): WBC, HGB, HCT, PLT, MCV, RDW, IRON, FERRITIN, RETIC, FOLATE, [id number], OCCULTBLOOD in the last 48 hours.       Chemistries:  No results for input(s): NA, K, CL, CO2, BUN, CREATININE, CALCIUM, ALBUMIN, PROT, BILITOT, ALKPHOS, ALT, AST, GLUCOSE, MG, PHOS in the last 48 hours.      All pertinent labs within the past 24 hours have been reviewed.    Significant Imaging:  I have reviewed all pertinent imaging results/findings within the past 24 hours.

## 2022-02-25 NOTE — ASSESSMENT & PLAN NOTE
Continue with scheduled hemodialysis for this patient.  2/24/2021  The patient is doing well with dialysis.  Continue with scheduled hemodialysis for this patient.

## 2022-02-25 NOTE — SUBJECTIVE & OBJECTIVE
Interval History: The patient is doing acceptable.  He has been doing cpap trials today.  No other acute changes.    Review of patient's allergies indicates:  No Known Allergies  Current Facility-Administered Medications   Medication Frequency    0.9%  NaCl infusion (for blood administration) Q24H PRN    0.9%  NaCl infusion PRN    acetaminophen tablet 500 mg Q6H PRN    albuterol nebulizer solution 2.5 mg Q4H PRN    bisacodyL EC tablet 10 mg Daily PRN    calcium gluconate 1 g in dextrose 5 % in water (D5W) 5 % 100 mL IVPB (MB+) Once    dextromethorphan-guaiFENesin  mg/5 ml liquid 10 mL Q6H PRN    dextrose 50% injection 12.5 g PRN    diltiaZEM tablet 60 mg Q6H    fentaNYL 50 mcg/hr 1 patch Q72H    glucagon (human recombinant) injection 1 mg PRN    heparin (porcine) injection 4,000 Units PRN    insulin aspart U-100 injection 1-10 Units Q6H    lorazepam injection 2 mg Q6H PRN    ondansetron injection 8 mg Q6H PRN    pantoprazole suspension 40 mg Daily    sevelamer carbonate pwpk 0.8 g TID WM    silver sulfADIAZINE 1% cream PRN    simethicone chewable tablet 80 mg TID PRN    ticagrelor tablet 90 mg BID       Objective:     Vital Signs (Most Recent):  Temp: 98.8 °F (37.1 °C) (02/24/22 1500)  Pulse: 96 (02/24/22 1800)  Resp: 19 (02/24/22 1800)  BP: 111/60 (02/24/22 1800)  SpO2: 100 % (02/24/22 1800)  O2 Device (Oxygen Therapy): ventilator (02/24/22 1134) Vital Signs (24h Range):  Temp:  [98.8 °F (37.1 °C)-100.2 °F (37.9 °C)] 98.8 °F (37.1 °C)  Pulse:  [] 96  Resp:  [8-39] 19  SpO2:  [100 %] 100 %  BP: (104-142)/(52-77) 111/60     Weight: 79.1 kg (174 lb 6.1 oz) (02/22/22 0400)  Body mass index is 24.32 kg/m².  Body surface area is 1.99 meters squared.    I/O last 3 completed shifts:  In: 100 [NG/GT:100]  Out: 850 [Urine:650; Stool:200]    Physical Exam  Vitals reviewed.   HENT:      Head: Atraumatic.      Mouth/Throat:      Mouth: Mucous membranes are dry.   Cardiovascular:      Rate and Rhythm: Regular rhythm.    Pulmonary:      Effort: Pulmonary effort is normal.   Abdominal:      Palpations: Abdomen is soft.   Neurological:      Mental Status: He is alert.       Significant Labs:  BMP:   Recent Labs   Lab 02/21/22  0824         K 5.1      CO2 24   BUN 81*   CREATININE 3.44*   CALCIUM 8.9     CBC:   Recent Labs   Lab 02/21/22  0824   WBC 18.47*   RBC 2.20*   HGB 6.5*   HCT 20.1*   *   MCV 91.4   MCH 29.5   MCHC 32.3        Significant Imaging:  Labs: Reviewed

## 2022-02-25 NOTE — PROGRESS NOTES
Wound care note;  Patient skin evaluated and dressing changes performed today  Patient in bed, alert, on ventilator  Sacral wound evolving, tan firm adjacent brown tissue noted, no odor noted , cont silvadene cream  Perineum wound with red granular tissue, bleeds easily, no odor noted, outer edges moist, little green drainage noted from old dressing.  Left posterior heel with 90 % soft adherent brown tissue and area of 10 % pink to edges   Right hand 95 % healed pink tissue area of tan adherent tissue noted.  Right medial and lateral ankle wounds dry purple discolored tissue.  Abdomen skin graft pink slow granular tissue , striated brown skin (graft) noted. No bleeding or odor noted   Right thigh donor site has xerofoam gauze noted, intact,applied dry dressing on 2/24/22, change MW  Cont turn protocol  Waffle boots  On low air loss mattress  Wound care team to cont to follow

## 2022-02-25 NOTE — PLAN OF CARE
Problem: Communication Impairment (Mechanical Ventilation, Invasive)  Goal: Effective Communication  Outcome: Ongoing, Progressing     Problem: Device-Related Complication Risk (Mechanical Ventilation, Invasive)  Goal: Optimal Device Function  Outcome: Ongoing, Progressing     Problem: Inability to Wean (Mechanical Ventilation, Invasive)  Goal: Mechanical Ventilation Liberation  Outcome: Ongoing, Progressing     Problem: Skin Injury Risk Increased  Goal: Skin Health and Integrity  Outcome: Ongoing, Progressing

## 2022-02-25 NOTE — PROGRESS NOTES
[facility]  Pulmonology  Progress Note    Patient Name: Og Garcia  MRN: [id number]  Admission Date: 12/23/2021  Hospital Length of Stay: 64 days  Code Status: Prior  Attending Provider: Cecil Abernathy DO  Primary Care Provider: Hector Arndt DNP, FNP-C   Principal Problem: Denice gangrene    Subjective:     Interval History: No acute events overnight. Currently hemodynamically stable. Oxygenating adequately on the ventilator. Currently afebrile.     Objective:     Vital Signs (Most Recent):  Temp: 98.9 °F (37.2 °C) (02/25/22 0400)  Pulse: 102 (02/25/22 0845)  Resp: (!) 34 (02/25/22 0945)  BP: 136/68 (02/25/22 0945)  SpO2: 100 % (02/25/22 0945) Vital Signs (24h Range):  Temp:  [97.8 °F (36.6 °C)-99.3 °F (37.4 °C)] 98.9 °F (37.2 °C)  Pulse:  [] 102  Resp:  [13-39] 34  SpO2:  [100 %] 100 %  BP: (109-142)/(57-77) 136/68     Weight: 79.7 kg (175 lb 11.3 oz)  Body mass index is 24.51 kg/m².      Intake/Output Summary (Last 24 hours) at 2/25/2022 0953  Last data filed at 2/25/2022 0500  Gross per 24 hour   Intake 1220 ml   Output 275 ml   Net 945 ml         Physical Exam  Vitals reviewed.   Constitutional:       General: He is not in acute distress.     Appearance: He is not ill-appearing.      Comments: Chronically ill appearing   HENT:      Head: Normocephalic and atraumatic.      Right Ear: External ear normal.      Left Ear: External ear normal.      Nose: Nose normal.      Mouth/Throat:      Mouth: Mucous membranes are moist.      Comments: trach  Eyes:      Conjunctiva/sclera: Conjunctivae normal.      Pupils: Pupils are equal, round, and reactive to light.   Cardiovascular:      Rate and Rhythm: Regular rhythm. Tachycardia present.      Pulses: Normal pulses.      Heart sounds: Normal heart sounds.   Pulmonary:      Effort: No respiratory distress.      Comments: clear breath sounds anteriorly  Abdominal:      Palpations: Abdomen is soft.   Musculoskeletal:          General: Normal range of motion.      Cervical back: Neck supple.   Lymphadenopathy:      Cervical: No cervical adenopathy.   Skin:     General: Skin is warm and dry.      Coloration: Skin is not pale.   Neurological:      Mental Status: Mental status is at baseline.      Comments: Moves spontaneously, tracks with good eye   Psychiatric:         Behavior: Behavior normal.       Vents:  Vent Mode: A/C (02/25/22 0700)  Set Rate: 16 BPM (02/25/22 0700)  Vt Set: 480 mL (02/25/22 0700)  Pressure Support: 12 cmH20 (02/25/22 0024)  PEEP/CPAP: 5 cmH20 (02/25/22 0700)  Oxygen Concentration (%): 31 (02/25/22 0825)  Peak Airway Pressure: 17 cmH2O (02/25/22 0700)  Total Ve: 7.7 mL (02/25/22 0700)  F/VT Ratio<105 (RSBI): (!) 89.74 (02/24/22 0500)    Lines/Drains/Airways       Peripherally Inserted Central Catheter Line  Duration             PICC Double Lumen 01/05/22 left basilic 51 days              Central Venous Catheter Line  Duration                  Hemodialysis Catheter 12/30/21 0900 right internal jugular 57 days              Drain  Duration                  NG/OG Tube Parker sump 18 Fr. Left nostril -- days         Colostomy 12/18/21 1030 Descending/sigmoid LUQ 68 days    Male External Urinary Catheter 02/21/22 Medium 4 days              Airway  Duration                  Surgical Airway 01/04/22 Shiley 52 days                    Significant Labs:    CBC/Anemia Profile:  No results for input(s): WBC, HGB, HCT, PLT, MCV, RDW, IRON, FERRITIN, RETIC, FOLATE, [id number], OCCULTBLOOD in the last 48 hours.       Chemistries:  No results for input(s): NA, K, CL, CO2, BUN, CREATININE, CALCIUM, ALBUMIN, PROT, BILITOT, ALKPHOS, ALT, AST, GLUCOSE, MG, PHOS in the last 48 hours.      All pertinent labs within the past 24 hours have been reviewed.    Significant Imaging:  I have reviewed all pertinent imaging results/findings within the past 24 hours.    Assessment/Plan:     * Denice gangrene  - s/p multiple debridements  - wound  vac in place  - ID consulted for antibiotic management: He was treated with rocephin, daptomycin, clindamycin. 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 Cazares  2/22- back to OR yesterday for skin grafting    ESRD (end stage renal disease)  Continue with HD per nephrology    Type 2 diabetes mellitus without complication, without long-term current use of insulin  - continue ISS  - glucose stable/controlled      Acute blood loss anemia  - H/H down to 5.5/16 - pt had bleeding from his HD insertion site yesterday this has now resolved.   - will transfuse 2 units PRBCs on HD  1/2- 9.7/27.5 - repeat CBC in AM  1/4- Hgb is 7.2. Not sure if he will be transfused during surgery. IF not we will try to get  Him transfused with next HD  01/06/2021 transfuse 1 unit of packed red blood cells today  01/10/2021 needs a unit of blood  1/11- repeat H&H in am. Continue to transfuse as needed.  1/13- Hgb is 5. Transfusing today. Post transfusion H&H  1/14- CBC pending for today  1/15- 6/18 - hemodynamically stable, recheck in AM   1/16-- 6.1/17.1 - critical low blood shortage in hospital - holding transfusion until next HD since he is hemodynamically stable and not actively bleeding   01/17/2021 day transfuse blood on dialysis today  01/18/2020 to hemoglobin up to 9 after transfusion  01/25/2022 no signs of bleeding most recent hemoglobin 8  01/29/2022 patient will need transfusion today  1/31- Hgb 6.7 yesterday. Can transfuse with next HD session  2/2- H&H is pending  2/3- plan to transfuse 1 unit of PRBC's  2/4 Hgb is 6.9 today. We will transfuse another unit today  2/7 8.4 now  2/14- 7.9 today  2/15- oozing some today. We are going to  hold heparin today. Continue to monitor H&H and transfuse as needed  INR 1.22. Oozing has improved  2/22- Hgb 6.5 on 2/21. Will transfuse 1 unit with next HD  2/24 repeat cbc in am    On mechanically assisted ventilation  - 12/14 intubated, tracheostomy 1/4  - Increase weaning trials, continues to have periods of apnea reported by RT on SIMV trials  - He is doing ok with current trials  - I am going to try him on PSV and see how he will do  2/24- Placed on CPAP/PSV this am during my exam and the patient is doing well.   2/25- plan for 2 hours of cpap tid today    Hypertension  - continue diltiazem  - bp is ok                 Cecil Abernathy, DO  Pulmonology  [facility]

## 2022-02-25 NOTE — ASSESSMENT & PLAN NOTE
- 12/14 intubated, tracheostomy 1/4  - Increase weaning trials, continues to have periods of apnea reported by RT on SIMV trials  - He is doing ok with current trials  - I am going to try him on PSV and see how he will do  2/24- Placed on CPAP/PSV this am during my exam and the patient is doing well.   2/25- plan for 2 hours of cpap tid today

## 2022-02-25 NOTE — NURSING
2000-Lying in bed with HOB^. Resp even et unlabored. L yahir Rey sump infusing tube feeding infusing w/o diff. Oral care done. #8 LPC trach intact. Vent settings: AC 16. . Fi02 35%. P-5. Placed on C-Pap 12-5-35%. R SC TLC HD cath noted. Multiple dsg noted. Colostomy noted to L ABD-intact-bag berth. WB et SCD noted to BLE. Safety measures on-going.     0000-No acute changes noted from initial assessment. Still on Cpap. Will continue to monitor.     0218-Place back on vent with AC mode.     0400-Physical assessment complete.    0600-Bathe. Earlier this shift. Colostomy emptied. Safety measures on-going.

## 2022-02-26 PROBLEM — E83.51 HYPOCALCEMIA: Status: RESOLVED | Noted: 2021-01-01 | Resolved: 2022-01-01

## 2022-02-26 PROBLEM — R50.9 FEVER: Status: ACTIVE | Noted: 2022-01-01

## 2022-02-26 PROBLEM — E83.39 HYPERPHOSPHATEMIA: Status: RESOLVED | Noted: 2022-01-01 | Resolved: 2022-01-01

## 2022-02-26 PROBLEM — R11.10 VOMITING: Status: ACTIVE | Noted: 2022-01-01

## 2022-02-26 NOTE — SUBJECTIVE & OBJECTIVE
Interval History: The patient is continuing to do acceptable.  He is tolerating CPAP trials.  No other  acute changes.    Review of patient's allergies indicates:  No Known Allergies  Current Facility-Administered Medications   Medication Frequency    0.9%  NaCl infusion (for blood administration) Q24H PRN    0.9%  NaCl infusion PRN    acetaminophen tablet 500 mg Q6H PRN    albuterol nebulizer solution 2.5 mg Q4H PRN    bisacodyL EC tablet 10 mg Daily PRN    calcium gluconate 1 g in dextrose 5 % in water (D5W) 5 % 100 mL IVPB (MB+) Once    dextromethorphan-guaiFENesin  mg/5 ml liquid 10 mL Q6H PRN    dextrose 50% injection 12.5 g PRN    diltiaZEM tablet 60 mg Q6H    fentaNYL 50 mcg/hr 1 patch Q72H    glucagon (human recombinant) injection 1 mg PRN    heparin (porcine) injection 4,000 Units PRN    insulin aspart U-100 injection 1-10 Units Q6H    lorazepam injection 2 mg Q6H PRN    ondansetron injection 8 mg Q6H PRN    pantoprazole suspension 40 mg Daily    piperacillin-tazobactam (ZOSYN) 4.5 g in dextrose 5 % in water (D5W) 5 % 100 mL IVPB (MB+) Q12H    sevelamer carbonate pwpk 0.8 g TID WM    silver sulfADIAZINE 1% cream PRN    simethicone chewable tablet 80 mg TID PRN    ticagrelor tablet 90 mg BID       Objective:     Vital Signs (Most Recent):  Temp: 99.3 °F (37.4 °C) (02/26/22 1200)  Pulse: 100 (02/26/22 1200)  Resp: 20 (02/26/22 1259)  BP: 125/65 (02/26/22 1200)  SpO2: 100 % (02/26/22 1200)  O2 Device (Oxygen Therapy): ventilator (02/26/22 0736) Vital Signs (24h Range):  Temp:  [97.6 °F (36.4 °C)-101.4 °F (38.6 °C)] 99.3 °F (37.4 °C)  Pulse:  [] 100  Resp:  [10-34] 20  SpO2:  [99 %-100 %] 100 %  BP: (109-130)/(54-72) 125/65     Weight: 79.7 kg (175 lb 11.3 oz) (02/25/22 0500)  Body mass index is 24.51 kg/m².  Body surface area is 2 meters squared.    I/O last 3 completed shifts:  In: 2745 [Other:500; NG/GT:2245]  Out: 2750 [Other:2000; Stool:750]    Physical Exam  Vitals reviewed.   HENT:      Head:  Atraumatic.      Mouth/Throat:      Mouth: Mucous membranes are moist.   Cardiovascular:      Rate and Rhythm: Regular rhythm.      Pulses: Normal pulses.   Pulmonary:      Effort: Pulmonary effort is normal.      Comments: CPAP trialys  Abdominal:      Palpations: Abdomen is soft.   Neurological:      Mental Status: He is alert.       Significant Labs:  BMP:   Recent Labs   Lab 02/21/22  0824         K 5.1      CO2 24   BUN 81*   CREATININE 3.44*   CALCIUM 8.9     CBC:   Recent Labs   Lab 02/25/22  1025   WBC 23.45*   RBC 2.61*   HGB 7.8*   HCT 23.2*   *   MCV 88.9   MCH 29.9   MCHC 33.6        Significant Imaging:  Labs: Reviewed

## 2022-02-26 NOTE — PLAN OF CARE
Problem: Adult Inpatient Plan of Care  Goal: Plan of Care Review  Outcome: Ongoing, Progressing     Problem: Bleeding (Sepsis/Septic Shock)  Goal: Absence of Bleeding  Outcome: Ongoing, Progressing     Problem: Glycemic Control Impaired (Sepsis/Septic Shock)  Goal: Blood Glucose Level Within Desired Range  Outcome: Ongoing, Progressing     Problem: Infection Progression (Sepsis/Septic Shock)  Goal: Absence of Infection Signs and Symptoms  Outcome: Ongoing, Progressing     Problem: Infection  Goal: Absence of Infection Signs and Symptoms  Outcome: Ongoing, Progressing     Problem: Fall Injury Risk  Goal: Absence of Fall and Fall-Related Injury  Outcome: Ongoing, Progressing

## 2022-02-26 NOTE — PLAN OF CARE
Problem: Skin and Tissue Injury (Artificial Airway)  Goal: Absence of Device-Related Skin or Tissue Injury  Outcome: Ongoing, Progressing     Problem: Skin Injury Risk Increased  Goal: Skin Health and Integrity  Outcome: Ongoing, Progressing     Problem: Impaired Wound Healing  Goal: Optimal Wound Healing  Outcome: Ongoing, Progressing     Problem: Gas Exchange Impaired  Goal: Optimal Gas Exchange  Outcome: Ongoing, Progressing

## 2022-02-26 NOTE — PROGRESS NOTES
[facility]  Nephrology  Progress Note    Patient Name: Og Garcia  MRN: [id number]  Admission Date: 12/23/2021  Hospital Length of Stay: 64 days  Attending Provider: Cecil Abernathy DO   Primary Care Physician: Hector Arndt DNP, FNP-C  Principal Problem:Denice gangrene    Subjective:     HPI: The patient is known from the previous nephrology consult at [facility].  He is no at Specialty and continues to need dialysis support.      Interval History: The patient had done cpap trials today and did well.  No other changes.    Review of patient's allergies indicates:  No Known Allergies  Current Facility-Administered Medications   Medication Frequency    0.9%  NaCl infusion (for blood administration) Q24H PRN    0.9%  NaCl infusion PRN    acetaminophen tablet 500 mg Q6H PRN    albuterol nebulizer solution 2.5 mg Q4H PRN    bisacodyL EC tablet 10 mg Daily PRN    calcium gluconate 1 g in dextrose 5 % in water (D5W) 5 % 100 mL IVPB (MB+) Once    dextromethorphan-guaiFENesin  mg/5 ml liquid 10 mL Q6H PRN    dextrose 50% injection 12.5 g PRN    diltiaZEM tablet 60 mg Q6H    fentaNYL 50 mcg/hr 1 patch Q72H    glucagon (human recombinant) injection 1 mg PRN    heparin (porcine) injection 4,000 Units PRN    insulin aspart U-100 injection 1-10 Units Q6H    lorazepam injection 2 mg Q6H PRN    ondansetron injection 8 mg Q6H PRN    pantoprazole suspension 40 mg Daily    sevelamer carbonate pwpk 0.8 g TID WM    silver sulfADIAZINE 1% cream PRN    simethicone chewable tablet 80 mg TID PRN    ticagrelor tablet 90 mg BID       Objective:     Vital Signs (Most Recent):  Temp: 98.6 °F (37 °C) (02/25/22 1632)  Pulse: 93 (02/25/22 2100)  Resp: 14 (02/25/22 2100)  BP: (!) 115/59 (02/25/22 2100)  SpO2: 100 % (02/25/22 2100)  O2 Device (Oxygen Therapy): ventilator (02/25/22 1515)   Vital Signs (24h Range):  Temp:  [98.3 °F (36.8 °C)-101.4 °F (38.6 °C)] 98.6 °F (37 °C)  Pulse:  []  93  Resp:  [13-34] 14  SpO2:  [99 %-100 %] 100 %  BP: (102-136)/(55-72) 115/59     Weight: 79.7 kg (175 lb 11.3 oz) (02/25/22 0500)  Body mass index is 24.51 kg/m².  Body surface area is 2 meters squared.    I/O last 3 completed shifts:  In: 2745 [Other:500; NG/GT:2245]  Out: 2475 [Urine:25; Other:2000; Stool:450]    Physical Exam  Vitals reviewed.   HENT:      Head: Atraumatic.   Cardiovascular:      Rate and Rhythm: Regular rhythm.      Heart sounds: Normal heart sounds.   Pulmonary:      Effort: Pulmonary effort is normal.      Comments: On trach collar  Abdominal:      Palpations: Abdomen is soft.       Significant Labs:  BMP:   Recent Labs   Lab 02/21/22  0824         K 5.1      CO2 24   BUN 81*   CREATININE 3.44*   CALCIUM 8.9     CBC:   Recent Labs   Lab 02/25/22  1025   WBC 23.45*   RBC 2.61*   HGB 7.8*   HCT 23.2*   *   MCV 88.9   MCH 29.9   MCHC 33.6        Significant Imaging:  Labs: Reviewed    Assessment/Plan:     * Denice gangrene  Continue wound care  2/3/2022 Continue with wound care  2/25/2022 Continue with wound care.    ESRD (end stage renal disease)  Continue with scheduled hemodialysis for this patient.  2/24/2021  The patient is doing well with dialysis.  Continue with scheduled hemodialysis for this patient.  2/25/2022.  The patient tolerates scheduled hemodialysis.        Thank you for your consult. I will follow-up with patient. Please contact us if you have any additional questions.    Edwin Pryor Jr, MD  Nephrology  [facility]

## 2022-02-26 NOTE — ASSESSMENT & PLAN NOTE
Continue with scheduled hemodialysis for this patient.  2/24/2021  The patient is doing well with dialysis.  Continue with scheduled hemodialysis for this patient.  2/25/2022.  The patient tolerates scheduled hemodialysis.  2/26/2022 Continuing with dialysis as scheduled.

## 2022-02-26 NOTE — PROGRESS NOTES
[facility]  Pulmonology  Progress Note    Patient Name: Og Garcia  MRN: [id number]  Admission Date: 12/23/2021  Hospital Length of Stay: 65 days  Code Status: Prior  Attending Provider: Cecil Abernathy DO  Primary Care Provider: Hector Arndt DNP, FNP-C   Principal Problem: Denice gangrene    Subjective:     Interval History: awake and alert. Vomited this morning. TMax 101.4     Objective:     Vital Signs (Most Recent):  Temp: 99.3 °F (37.4 °C) (02/26/22 1200)  Pulse: 100 (02/26/22 1200)  Resp: 20 (02/26/22 1259)  BP: 125/65 (02/26/22 1200)  SpO2: 100 % (02/26/22 1200) Vital Signs (24h Range):  Temp:  [97.6 °F (36.4 °C)-101.4 °F (38.6 °C)] 99.3 °F (37.4 °C)  Pulse:  [] 100  Resp:  [10-34] 20  SpO2:  [99 %-100 %] 100 %  BP: (109-130)/(54-72) 125/65     Weight: 79.7 kg (175 lb 11.3 oz)  Body mass index is 24.51 kg/m².      Intake/Output Summary (Last 24 hours) at 2/26/2022 1443  Last data filed at 2/26/2022 0700  Gross per 24 hour   Intake 1025 ml   Output 500 ml   Net 525 ml         Physical Exam  Vitals reviewed.   Constitutional:       General: He is not in acute distress.     Appearance: He is not ill-appearing.      Comments: Chronically ill appearing   HENT:      Head: Normocephalic and atraumatic.      Right Ear: External ear normal.      Left Ear: External ear normal.      Nose: Nose normal.      Mouth/Throat:      Mouth: Mucous membranes are moist.      Comments: trach  Eyes:      Conjunctiva/sclera: Conjunctivae normal.      Pupils: Pupils are equal, round, and reactive to light.   Cardiovascular:      Rate and Rhythm: Regular rhythm. Tachycardia present.      Pulses: Normal pulses.      Heart sounds: Normal heart sounds.   Pulmonary:      Effort: No respiratory distress.      Comments: clear breath sounds anteriorly  Abdominal:      Palpations: Abdomen is soft.   Musculoskeletal:         General: Normal range of motion.      Cervical back: Neck supple.    Lymphadenopathy:      Cervical: No cervical adenopathy.   Skin:     General: Skin is warm and dry.      Coloration: Skin is not pale.   Neurological:      Mental Status: Mental status is at baseline.      Comments: Moves spontaneously, tracks with good eye   Psychiatric:         Behavior: Behavior normal.       Vents:  Vent Mode: A/C (02/26/22 1152)  Set Rate: 16 BPM (02/26/22 1152)  Vt Set: 480 mL (02/26/22 1152)  Pressure Support: 10 cmH20 (02/25/22 2004)  PEEP/CPAP: 5 cmH20 (02/26/22 1152)  Oxygen Concentration (%): 35 (02/26/22 0813)  Peak Airway Pressure: 16 cmH2O (02/26/22 1152)  Total Ve: 10.8 mL (02/26/22 1152)  F/VT Ratio<105 (RSBI): (!) 45.7 (02/26/22 0027)    Lines/Drains/Airways       Peripherally Inserted Central Catheter Line  Duration             PICC Double Lumen 01/05/22 left basilic 52 days              Central Venous Catheter Line  Duration                  Hemodialysis Catheter 12/30/21 0900 right internal jugular 58 days              Drain  Duration                  NG/OG Tube [place] sump 18 Fr. Left nostril -- days         Colostomy 12/18/21 1030 Descending/sigmoid LUQ 70 days    Male External Urinary Catheter 02/21/22 Medium 5 days              Airway  Duration                  Surgical Airway 01/04/22 Shiley 53 days                    Significant Labs:    CBC/Anemia Profile:  Recent Labs   Lab 02/25/22  1025   WBC 23.45*   HGB 7.8*   HCT 23.2*   *   MCV 88.9   RDW 15.1*          Chemistries:  No results for input(s): NA, K, CL, CO2, BUN, CREATININE, CALCIUM, ALBUMIN, PROT, BILITOT, ALKPHOS, ALT, AST, GLUCOSE, MG, PHOS in the last 48 hours.      All pertinent labs within the past 24 hours have been reviewed.    Significant Imaging:  I have reviewed all pertinent imaging results/findings within the past 24 hours.    Assessment/Plan:     * Denice gangrene  - s/p multiple debridements  - wound vac in place  - ID consulted for antibiotic management: He was treated with rocephin,  daptomycin, clindamycin. 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 Cazares  2/22- back to OR yesterday for skin grafting    Fever  Tmax 101.4 -- resp culture - with GNB - will start Zosyn today - follow cultures   BC x 2 NGTD     Vomiting  Holding TFs this morning. Will resume this afternoon. PRN zofran     ESRD (end stage renal disease)  Continue with HD per nephrology  TTS     Disseminated mucormycosis  Defer to Dr. Hall on all antibiotic choices---Her note has been reviewed and is appreciated.     Amphotericin until 02/03   zosyn and gent for pseudomonal double coverage until 02/01 2/4 treatment completed    Type 2 diabetes mellitus without complication, without long-term current use of insulin  - continue ISS  - glucose stable/controlled      Acute blood loss anemia  - H/H down to 5.5/16 - pt had bleeding from his HD insertion site yesterday this has now resolved.   - will transfuse 2 units PRBCs on HD  1/2- 9.7/27.5 - repeat CBC in AM  1/4- Hgb is 7.2. Not sure if he will be transfused during surgery. IF not we will try to get  Him transfused with next HD  01/06/2021 transfuse 1 unit of packed red blood cells today  01/10/2021 needs a unit of blood  1/11- repeat H&H in am. Continue to transfuse as needed.  1/13- Hgb is 5. Transfusing today. Post transfusion H&H  1/14- CBC pending for today  1/15- 6/18 - hemodynamically stable, recheck in AM   1/16-- 6.1/17.1 - critical low blood shortage in hospital - holding transfusion until next HD since he is hemodynamically stable and not actively bleeding   01/17/2021 day transfuse blood on dialysis today  01/18/2020 to hemoglobin up to 9 after  transfusion  01/25/2022 no signs of bleeding most recent hemoglobin 8  01/29/2022 patient will need transfusion today  1/31- Hgb 6.7 yesterday. Can transfuse with next HD session  2/2- H&H is pending  2/3- plan to transfuse 1 unit of PRBC's  2/4 Hgb is 6.9 today. We will transfuse another unit today  2/7 8.4 now  2/14- 7.9 today  2/15- oozing some today. We are going to hold heparin today. Continue to monitor H&H and transfuse as needed  INR 1.22. Oozing has improved  2/22- Hgb 6.5 on 2/21. Will transfuse 1 unit with next HD  2/25 - 7/23    Necrotizing fasciitis  Ongoing issue    On mechanically assisted ventilation  - 12/14 intubated, tracheostomy 1/4  - Increase weaning trials, continues to have periods of apnea reported by RT on SIMV trials  - He is doing ok with current trials  - I am going to try him on PSV and see how he will do  2/24- Placed on CPAP/PSV this am during my exam and the patient is doing well.   2/25- plan for 2 hours of cpap tid today  2/26- tolerating CPAP                FLORESITA Shafer-ACNP  Pulmonology  [facility]

## 2022-02-26 NOTE — PROGRESS NOTES
[facility]  Nephrology  Progress Note    Patient Name: Og Garcia  MRN: [id number]  Admission Date: 12/23/2021  Hospital Length of Stay: 65 days  Attending Provider: Cecil Abernathy DO   Primary Care Physician: Hector Arndt DNP, FNP-C  Principal Problem:Denice gangrene    Subjective:     HPI: The patient is known from the previous nephrology consult at [facility].  He is no at Specialty and continues to need dialysis support.      Interval History: The patient is continuing to do acceptable.  He is tolerating CPAP trials.  No other  acute changes.    Review of patient's allergies indicates:  No Known Allergies  Current Facility-Administered Medications   Medication Frequency    0.9%  NaCl infusion (for blood administration) Q24H PRN    0.9%  NaCl infusion PRN    acetaminophen tablet 500 mg Q6H PRN    albuterol nebulizer solution 2.5 mg Q4H PRN    bisacodyL EC tablet 10 mg Daily PRN    calcium gluconate 1 g in dextrose 5 % in water (D5W) 5 % 100 mL IVPB (MB+) Once    dextromethorphan-guaiFENesin  mg/5 ml liquid 10 mL Q6H PRN    dextrose 50% injection 12.5 g PRN    diltiaZEM tablet 60 mg Q6H    fentaNYL 50 mcg/hr 1 patch Q72H    glucagon (human recombinant) injection 1 mg PRN    heparin (porcine) injection 4,000 Units PRN    insulin aspart U-100 injection 1-10 Units Q6H    lorazepam injection 2 mg Q6H PRN    ondansetron injection 8 mg Q6H PRN    pantoprazole suspension 40 mg Daily    piperacillin-tazobactam (ZOSYN) 4.5 g in dextrose 5 % in water (D5W) 5 % 100 mL IVPB (MB+) Q12H    sevelamer carbonate pwpk 0.8 g TID WM    silver sulfADIAZINE 1% cream PRN    simethicone chewable tablet 80 mg TID PRN    ticagrelor tablet 90 mg BID       Objective:     Vital Signs (Most Recent):  Temp: 99.3 °F (37.4 °C) (02/26/22 1200)  Pulse: 100 (02/26/22 1200)  Resp: 20 (02/26/22 1259)  BP: 125/65 (02/26/22 1200)  SpO2: 100 % (02/26/22 1200)  O2 Device (Oxygen Therapy):  ventilator (02/26/22 0736) Vital Signs (24h Range):  Temp:  [97.6 °F (36.4 °C)-101.4 °F (38.6 °C)] 99.3 °F (37.4 °C)  Pulse:  [] 100  Resp:  [10-34] 20  SpO2:  [99 %-100 %] 100 %  BP: (109-130)/(54-72) 125/65     Weight: 79.7 kg (175 lb 11.3 oz) (02/25/22 0500)  Body mass index is 24.51 kg/m².  Body surface area is 2 meters squared.    I/O last 3 completed shifts:  In: 2745 [Other:500; NG/GT:2245]  Out: 2750 [Other:2000; Stool:750]    Physical Exam  Vitals reviewed.   HENT:      Head: Atraumatic.      Mouth/Throat:      Mouth: Mucous membranes are moist.   Cardiovascular:      Rate and Rhythm: Regular rhythm.      Pulses: Normal pulses.   Pulmonary:      Effort: Pulmonary effort is normal.      Comments: CPAP trialys  Abdominal:      Palpations: Abdomen is soft.   Neurological:      Mental Status: He is alert.       Significant Labs:  BMP:   Recent Labs   Lab 02/21/22  0824         K 5.1      CO2 24   BUN 81*   CREATININE 3.44*   CALCIUM 8.9     CBC:   Recent Labs   Lab 02/25/22  1025   WBC 23.45*   RBC 2.61*   HGB 7.8*   HCT 23.2*   *   MCV 88.9   MCH 29.9   MCHC 33.6        Significant Imaging:  Labs: Reviewed    Assessment/Plan:     ESRD (end stage renal disease)  Continue with scheduled hemodialysis for this patient.  2/24/2021  The patient is doing well with dialysis.  Continue with scheduled hemodialysis for this patient.  2/25/2022.  The patient tolerates scheduled hemodialysis.  2/26/2022 Continuing with dialysis as scheduled.        Thank you for your consult. I will follow-up with patient. Please contact us if you have any additional questions.    Edwin Pryor Jr, MD  Nephrology  [facility]

## 2022-02-26 NOTE — SUBJECTIVE & OBJECTIVE
Interval History: The patient had done cpap trials today and did well.  No other changes.    Review of patient's allergies indicates:  No Known Allergies  Current Facility-Administered Medications   Medication Frequency    0.9%  NaCl infusion (for blood administration) Q24H PRN    0.9%  NaCl infusion PRN    acetaminophen tablet 500 mg Q6H PRN    albuterol nebulizer solution 2.5 mg Q4H PRN    bisacodyL EC tablet 10 mg Daily PRN    calcium gluconate 1 g in dextrose 5 % in water (D5W) 5 % 100 mL IVPB (MB+) Once    dextromethorphan-guaiFENesin  mg/5 ml liquid 10 mL Q6H PRN    dextrose 50% injection 12.5 g PRN    diltiaZEM tablet 60 mg Q6H    fentaNYL 50 mcg/hr 1 patch Q72H    glucagon (human recombinant) injection 1 mg PRN    heparin (porcine) injection 4,000 Units PRN    insulin aspart U-100 injection 1-10 Units Q6H    lorazepam injection 2 mg Q6H PRN    ondansetron injection 8 mg Q6H PRN    pantoprazole suspension 40 mg Daily    sevelamer carbonate pwpk 0.8 g TID WM    silver sulfADIAZINE 1% cream PRN    simethicone chewable tablet 80 mg TID PRN    ticagrelor tablet 90 mg BID       Objective:     Vital Signs (Most Recent):  Temp: 98.6 °F (37 °C) (02/25/22 1632)  Pulse: 93 (02/25/22 2100)  Resp: 14 (02/25/22 2100)  BP: (!) 115/59 (02/25/22 2100)  SpO2: 100 % (02/25/22 2100)  O2 Device (Oxygen Therapy): ventilator (02/25/22 1515)   Vital Signs (24h Range):  Temp:  [98.3 °F (36.8 °C)-101.4 °F (38.6 °C)] 98.6 °F (37 °C)  Pulse:  [] 93  Resp:  [13-34] 14  SpO2:  [99 %-100 %] 100 %  BP: (102-136)/(55-72) 115/59     Weight: 79.7 kg (175 lb 11.3 oz) (02/25/22 0500)  Body mass index is 24.51 kg/m².  Body surface area is 2 meters squared.    I/O last 3 completed shifts:  In: 2745 [Other:500; NG/GT:2245]  Out: 2475 [Urine:25; Other:2000; Stool:450]    Physical Exam  Vitals reviewed.   HENT:      Head: Atraumatic.   Cardiovascular:      Rate and Rhythm: Regular rhythm.      Heart sounds: Normal heart sounds.    Pulmonary:      Effort: Pulmonary effort is normal.      Comments: On trach collar  Abdominal:      Palpations: Abdomen is soft.       Significant Labs:  BMP:   Recent Labs   Lab 02/21/22  0824         K 5.1      CO2 24   BUN 81*   CREATININE 3.44*   CALCIUM 8.9     CBC:   Recent Labs   Lab 02/25/22  1025   WBC 23.45*   RBC 2.61*   HGB 7.8*   HCT 23.2*   *   MCV 88.9   MCH 29.9   MCHC 33.6        Significant Imaging:  Labs: Reviewed

## 2022-02-26 NOTE — NURSING
16:00 Patients temperature 101.4 and WBC 24. Dr. Abernathy notified. Blood cultures, Sputum culture and flu swab ordered and collected

## 2022-02-26 NOTE — ASSESSMENT & PLAN NOTE
- H/H down to 5.5/16 - pt had bleeding from his HD insertion site yesterday this has now resolved.   - will transfuse 2 units PRBCs on HD  1/2- 9.7/27.5 - repeat CBC in AM  1/4- Hgb is 7.2. Not sure if he will be transfused during surgery. IF not we will try to get  Him transfused with next HD  01/06/2021 transfuse 1 unit of packed red blood cells today  01/10/2021 needs a unit of blood  1/11- repeat H&H in am. Continue to transfuse as needed.  1/13- Hgb is 5. Transfusing today. Post transfusion H&H  1/14- CBC pending for today  1/15- 6/18 - hemodynamically stable, recheck in AM   1/16-- 6.1/17.1 - critical low blood shortage in hospital - holding transfusion until next HD since he is hemodynamically stable and not actively bleeding   01/17/2021 day transfuse blood on dialysis today  01/18/2020 to hemoglobin up to 9 after transfusion  01/25/2022 no signs of bleeding most recent hemoglobin 8  01/29/2022 patient will need transfusion today  1/31- Hgb 6.7 yesterday. Can transfuse with next HD session  2/2- H&H is pending  2/3- plan to transfuse 1 unit of PRBC's  2/4 Hgb is 6.9 today. We will transfuse another unit today  2/7 8.4 now  2/14- 7.9 today  2/15- oozing some today. We are going to hold heparin today. Continue to monitor H&H and transfuse as needed  INR 1.22. Oozing has improved  2/22- Hgb 6.5 on 2/21. Will transfuse 1 unit with next HD  2/25 - 7/23

## 2022-02-26 NOTE — SUBJECTIVE & OBJECTIVE
Interval History: awake and alert. Vomited this morning     Objective:     Vital Signs (Most Recent):  Temp: 99.3 °F (37.4 °C) (02/26/22 1200)  Pulse: 100 (02/26/22 1200)  Resp: 20 (02/26/22 1259)  BP: 125/65 (02/26/22 1200)  SpO2: 100 % (02/26/22 1200) Vital Signs (24h Range):  Temp:  [97.6 °F (36.4 °C)-101.4 °F (38.6 °C)] 99.3 °F (37.4 °C)  Pulse:  [] 100  Resp:  [10-34] 20  SpO2:  [99 %-100 %] 100 %  BP: (109-130)/(54-72) 125/65     Weight: 79.7 kg (175 lb 11.3 oz)  Body mass index is 24.51 kg/m².      Intake/Output Summary (Last 24 hours) at 2/26/2022 1443  Last data filed at 2/26/2022 0700  Gross per 24 hour   Intake 1025 ml   Output 500 ml   Net 525 ml         Physical Exam  Vitals reviewed.   Constitutional:       General: He is not in acute distress.     Appearance: He is not ill-appearing.      Comments: Chronically ill appearing   HENT:      Head: Normocephalic and atraumatic.      Right Ear: External ear normal.      Left Ear: External ear normal.      Nose: Nose normal.      Mouth/Throat:      Mouth: Mucous membranes are moist.      Comments: trach  Eyes:      Conjunctiva/sclera: Conjunctivae normal.      Pupils: Pupils are equal, round, and reactive to light.   Cardiovascular:      Rate and Rhythm: Regular rhythm. Tachycardia present.      Pulses: Normal pulses.      Heart sounds: Normal heart sounds.   Pulmonary:      Effort: No respiratory distress.      Comments: clear breath sounds anteriorly  Abdominal:      Palpations: Abdomen is soft.   Musculoskeletal:         General: Normal range of motion.      Cervical back: Neck supple.   Lymphadenopathy:      Cervical: No cervical adenopathy.   Skin:     General: Skin is warm and dry.      Coloration: Skin is not pale.   Neurological:      Mental Status: Mental status is at baseline.      Comments: Moves spontaneously, tracks with good eye   Psychiatric:         Behavior: Behavior normal.       Vents:  Vent Mode: A/C (02/26/22 1152)  Set Rate: 16 BPM  (02/26/22 1152)  Vt Set: 480 mL (02/26/22 1152)  Pressure Support: 10 cmH20 (02/25/22 2004)  PEEP/CPAP: 5 cmH20 (02/26/22 1152)  Oxygen Concentration (%): 35 (02/26/22 0813)  Peak Airway Pressure: 16 cmH2O (02/26/22 1152)  Total Ve: 10.8 mL (02/26/22 1152)  F/VT Ratio<105 (RSBI): (!) 45.7 (02/26/22 0027)    Lines/Drains/Airways       Peripherally Inserted Central Catheter Line  Duration             PICC Double Lumen 01/05/22 left basilic 52 days              Central Venous Catheter Line  Duration                  Hemodialysis Catheter 12/30/21 0900 right internal jugular 58 days              Drain  Duration                  NG/OG Tube [place] sump 18 Fr. Left nostril -- days         Colostomy 12/18/21 1030 Descending/sigmoid LUQ 70 days    Male External Urinary Catheter 02/21/22 Medium 5 days              Airway  Duration                  Surgical Airway 01/04/22 Shiley 53 days                    Significant Labs:    CBC/Anemia Profile:  Recent Labs   Lab 02/25/22  1025   WBC 23.45*   HGB 7.8*   HCT 23.2*   *   MCV 88.9   RDW 15.1*          Chemistries:  No results for input(s): NA, K, CL, CO2, BUN, CREATININE, CALCIUM, ALBUMIN, PROT, BILITOT, ALKPHOS, ALT, AST, GLUCOSE, MG, PHOS in the last 48 hours.      All pertinent labs within the past 24 hours have been reviewed.    Significant Imaging:  I have reviewed all pertinent imaging results/findings within the past 24 hours.

## 2022-02-26 NOTE — ASSESSMENT & PLAN NOTE
- 12/14 intubated, tracheostomy 1/4  - Increase weaning trials, continues to have periods of apnea reported by RT on SIMV trials  - He is doing ok with current trials  - I am going to try him on PSV and see how he will do  2/24- Placed on CPAP/PSV this am during my exam and the patient is doing well.   2/25- plan for 2 hours of cpap tid today  2/26- tolerating CPAP

## 2022-02-26 NOTE — RESPIRATORY THERAPY
Patient did a 4 hr trail. While suctioning tonight  Patient coughed up a lot of plugs from inline suction.

## 2022-02-26 NOTE — RESPIRATORY THERAPY
1706 placed patient on CPAP 12, 35%+5. HR: 108, O2 100%, RR 14, /62. Will continue to monitor. If all goes well, patient will be placed back on rate by oncoming night shift RT.

## 2022-02-26 NOTE — ASSESSMENT & PLAN NOTE
Continue with scheduled hemodialysis for this patient.  2/24/2021  The patient is doing well with dialysis.  Continue with scheduled hemodialysis for this patient.  2/25/2022.  The patient tolerates scheduled hemodialysis.

## 2022-02-27 PROBLEM — N17.9 AKI (ACUTE KIDNEY INJURY): Status: RESOLVED | Noted: 2021-01-01 | Resolved: 2022-01-01

## 2022-02-27 NOTE — ASSESSMENT & PLAN NOTE
2/26 -Tmax 101.4 -- resp culture - with GNB - will start Zosyn today - follow cultures   BC x 2 NGTD   2/27 - afebrile

## 2022-02-27 NOTE — PROGRESS NOTES
[facility]  Nephrology  Progress Note    Patient Name: Og Garcia  MRN: [id number]  Admission Date: 12/23/2021  Hospital Length of Stay: 66 days  Attending Provider: Cecil Abernathy DO   Primary Care Physician: Hector Arndt DNP, FNP-C  Principal Problem:Denice gangrene    Subjective:     HPI: The patient is known from the previous nephrology consult at [facility].  He is no at Specialty and continues to need dialysis support.      Interval History: The patient is resting.  He is in the middle of dressing change.  No other changes.   Continue with weaning trials.    Review of patient's allergies indicates:  No Known Allergies  Current Facility-Administered Medications   Medication Frequency    0.9%  NaCl infusion (for blood administration) Q24H PRN    0.9%  NaCl infusion PRN    acetaminophen tablet 500 mg Q6H PRN    albuterol nebulizer solution 2.5 mg Q4H PRN    bisacodyL EC tablet 10 mg Daily PRN    calcium gluconate 1 g in dextrose 5 % in water (D5W) 5 % 100 mL IVPB (MB+) Once    dextromethorphan-guaiFENesin  mg/5 ml liquid 10 mL Q6H PRN    dextrose 50% injection 12.5 g PRN    diltiaZEM tablet 60 mg Q6H    fentaNYL 50 mcg/hr 1 patch Q72H    glucagon (human recombinant) injection 1 mg PRN    heparin (porcine) injection 4,000 Units PRN    insulin aspart U-100 injection 1-10 Units Q6H    lorazepam injection 2 mg Q6H PRN    ondansetron injection 8 mg Q6H PRN    pantoprazole suspension 40 mg Daily    piperacillin-tazobactam (ZOSYN) 4.5 g in dextrose 5 % in water (D5W) 5 % 100 mL IVPB (MB+) Q12H    sevelamer carbonate pwpk 0.8 g TID WM    silver sulfADIAZINE 1% cream PRN    simethicone chewable tablet 80 mg TID PRN    ticagrelor tablet 90 mg BID       Objective:     Vital Signs (Most Recent):  Temp: 98.9 °F (37.2 °C) (02/27/22 0805)  Pulse: 108 (02/27/22 0805)  Resp: 19 (02/27/22 0805)  BP: (!) 99/49 (02/27/22 0805)  SpO2: 100 % (02/27/22 0805)  O2 Device  (Oxygen Therapy): ventilator (02/27/22 1221)   Vital Signs (24h Range):  Temp:  [98.7 °F (37.1 °C)-100 °F (37.8 °C)] 98.9 °F (37.2 °C)  Pulse:  [102-115] 108  Resp:  [14-35] 19  SpO2:  [100 %] 100 %  BP: ()/(41-63) 99/49     Weight: 79.7 kg (175 lb 11.3 oz) (02/25/22 0500)  Body mass index is 24.51 kg/m².  Body surface area is 2 meters squared.    I/O last 3 completed shifts:  In: 700 [NG/GT:600; IV Piggyback:100]  Out: 1250 [Urine:600; Stool:650]    Physical Exam  Vitals reviewed.   HENT:      Head: Atraumatic.   Cardiovascular:      Rate and Rhythm: Regular rhythm.   Pulmonary:      Effort: Pulmonary effort is normal.      Comments: CPAP trials  Abdominal:      General: Abdomen is flat.   Neurological:      Mental Status: He is alert.       Significant Labs:  BMP:   Recent Labs   Lab 02/21/22  0824         K 5.1      CO2 24   BUN 81*   CREATININE 3.44*   CALCIUM 8.9        Significant Imaging:  Labs: Reviewed    Assessment/Plan:     ESRD (end stage renal disease)  Continue with scheduled hemodialysis for this patient.  2/24/2021  The patient is doing well with dialysis.  Continue with scheduled hemodialysis for this patient.  2/25/2022.  The patient tolerates scheduled hemodialysis.  2/26/2022 Continuing with dialysis as scheduled  2/27/2022.  Dialysis as scheduled.      Debility  Anemia  Gangrene  Thank you for your consult. I will follow-up with patient. Please contact us if you have any additional questions.    Edwin Pryor Jr, MD  Nephrology  [facility]

## 2022-02-27 NOTE — SUBJECTIVE & OBJECTIVE
Interval History: awake, watching TV. No complaints     Objective:     Vital Signs (Most Recent):  Temp: 98.6 °F (37 °C) (02/27/22 1300)  Pulse: (!) 115 (02/27/22 1300)  Resp: (!) 28 (02/27/22 1300)  BP: 114/66 (02/27/22 1300)  SpO2: 100 % (02/27/22 1300)   Vital Signs (24h Range):  Temp:  [98.6 °F (37 °C)-100 °F (37.8 °C)] 98.6 °F (37 °C)  Pulse:  [103-115] 115  Resp:  [9-35] 28  SpO2:  [97 %-100 %] 100 %  BP: ()/(41-66) 114/66     Weight: 79.7 kg (175 lb 11.3 oz)  Body mass index is 24.51 kg/m².      Intake/Output Summary (Last 24 hours) at 2/27/2022 1452  Last data filed at 2/27/2022 1253  Gross per 24 hour   Intake 1060 ml   Output 1050 ml   Net 10 ml       Physical Exam  Vitals reviewed.   Constitutional:       General: He is not in acute distress.     Appearance: He is not ill-appearing.      Comments: Chronically ill appearing   HENT:      Head: Normocephalic and atraumatic.      Right Ear: External ear normal.      Left Ear: External ear normal.      Nose: Nose normal.      Mouth/Throat:      Mouth: Mucous membranes are moist.      Comments: trach  Eyes:      Conjunctiva/sclera: Conjunctivae normal.      Pupils: Pupils are equal, round, and reactive to light.   Cardiovascular:      Rate and Rhythm: Normal rate and regular rhythm.      Pulses: Normal pulses.      Heart sounds: Normal heart sounds.   Pulmonary:      Effort: No respiratory distress.      Breath sounds: Normal breath sounds.      Comments: clear breath sounds anteriorly  Abdominal:      Palpations: Abdomen is soft.   Musculoskeletal:      Cervical back: Neck supple.   Lymphadenopathy:      Cervical: No cervical adenopathy.   Skin:     General: Skin is warm and dry.      Coloration: Skin is not pale.   Neurological:      Mental Status: He is alert. Mental status is at baseline.      Comments: Moves spontaneously, tracks with good eye   Psychiatric:         Behavior: Behavior normal.       Vents:  Vent Mode: A/C (02/27/22 1221)  Set Rate: 14  BPM (02/27/22 0345)  Vt Set: 480 mL (02/27/22 0345)  Pressure Support: 12 cmH20 (02/27/22 1221)  PEEP/CPAP: 5 cmH20 (02/27/22 1221)  Oxygen Concentration (%): 35 (02/27/22 1221)  Peak Airway Pressure: 18 cmH2O (02/27/22 1221)  Total Ve: 15 mL (02/27/22 1221)  F/VT Ratio<105 (RSBI): (!) 73.96 (02/27/22 0345)    Lines/Drains/Airways       Peripherally Inserted Central Catheter Line  Duration             PICC Double Lumen 01/05/22 left basilic 53 days              Central Venous Catheter Line  Duration                  Hemodialysis Catheter 12/30/21 0900 right internal jugular 59 days              Drain  Duration                  NG/OG Tube [place] sump 18 Fr. Left nostril -- days         Colostomy 12/18/21 1030 Descending/sigmoid LUQ 71 days    Male External Urinary Catheter 02/21/22 Medium 6 days              Airway  Duration                  Surgical Airway 01/04/22 Shiley 54 days                    Significant Labs:    CBC/Anemia Profile:  No results for input(s): WBC, HGB, HCT, PLT, MCV, RDW, IRON, FERRITIN, RETIC, FOLATE, [id number], OCCULTBLOOD in the last 48 hours.     Chemistries:  No results for input(s): NA, K, CL, CO2, BUN, CREATININE, CALCIUM, ALBUMIN, PROT, BILITOT, ALKPHOS, ALT, AST, GLUCOSE, MG, PHOS in the last 48 hours.    All pertinent labs within the past 24 hours have been reviewed.    Significant Imaging:  I have reviewed all pertinent imaging results/findings within the past 24 hours.

## 2022-02-27 NOTE — ASSESSMENT & PLAN NOTE
- 12/14 intubated, tracheostomy 1/4  - Increase weaning trials, continues to have periods of apnea reported by RT on SIMV trials  - He is doing ok with current trials  - I am going to try him on PSV and see how he will do  2/24- Placed on CPAP/PSV this am during my exam and the patient is doing well.   2/25- plan for 2 hours of cpap tid today  2/27  tolerating CPAP -- increase as tolerated

## 2022-02-27 NOTE — PLAN OF CARE
Problem: Adult Inpatient Plan of Care  Goal: Plan of Care Review  Outcome: Ongoing, Progressing     Problem: Glycemic Control Impaired (Sepsis/Septic Shock)  Goal: Blood Glucose Level Within Desired Range  Outcome: Ongoing, Progressing     Problem: Nutrition Impaired (Sepsis/Septic Shock)  Goal: Optimal Nutrition Intake  Outcome: Ongoing, Progressing     Problem: Infection  Goal: Absence of Infection Signs and Symptoms  Outcome: Ongoing, Progressing     Problem: Fall Injury Risk  Goal: Absence of Fall and Fall-Related Injury  Outcome: Ongoing, Progressing

## 2022-02-27 NOTE — PLAN OF CARE
Problem: Skin and Tissue Injury (Mechanical Ventilation, Invasive)  Goal: Absence of Device-Related Skin and Tissue Injury  Outcome: Ongoing, Progressing     Problem: Gas Exchange Impaired  Goal: Optimal Gas Exchange  Outcome: Ongoing, Progressing

## 2022-02-27 NOTE — ASSESSMENT & PLAN NOTE
Continue with scheduled hemodialysis for this patient.  2/24/2021  The patient is doing well with dialysis.  Continue with scheduled hemodialysis for this patient.  2/25/2022.  The patient tolerates scheduled hemodialysis.  2/26/2022 Continuing with dialysis as scheduled  2/27/2022.  Dialysis as scheduled.

## 2022-02-27 NOTE — SUBJECTIVE & OBJECTIVE
Interval History: The patient is resting.  He is in the middle of dressing change.  No other changes.   Continue with weaning trials.    Review of patient's allergies indicates:  No Known Allergies  Current Facility-Administered Medications   Medication Frequency    0.9%  NaCl infusion (for blood administration) Q24H PRN    0.9%  NaCl infusion PRN    acetaminophen tablet 500 mg Q6H PRN    albuterol nebulizer solution 2.5 mg Q4H PRN    bisacodyL EC tablet 10 mg Daily PRN    calcium gluconate 1 g in dextrose 5 % in water (D5W) 5 % 100 mL IVPB (MB+) Once    dextromethorphan-guaiFENesin  mg/5 ml liquid 10 mL Q6H PRN    dextrose 50% injection 12.5 g PRN    diltiaZEM tablet 60 mg Q6H    fentaNYL 50 mcg/hr 1 patch Q72H    glucagon (human recombinant) injection 1 mg PRN    heparin (porcine) injection 4,000 Units PRN    insulin aspart U-100 injection 1-10 Units Q6H    lorazepam injection 2 mg Q6H PRN    ondansetron injection 8 mg Q6H PRN    pantoprazole suspension 40 mg Daily    piperacillin-tazobactam (ZOSYN) 4.5 g in dextrose 5 % in water (D5W) 5 % 100 mL IVPB (MB+) Q12H    sevelamer carbonate pwpk 0.8 g TID WM    silver sulfADIAZINE 1% cream PRN    simethicone chewable tablet 80 mg TID PRN    ticagrelor tablet 90 mg BID       Objective:     Vital Signs (Most Recent):  Temp: 98.9 °F (37.2 °C) (02/27/22 0805)  Pulse: 108 (02/27/22 0805)  Resp: 19 (02/27/22 0805)  BP: (!) 99/49 (02/27/22 0805)  SpO2: 100 % (02/27/22 0805)  O2 Device (Oxygen Therapy): ventilator (02/27/22 1221)   Vital Signs (24h Range):  Temp:  [98.7 °F (37.1 °C)-100 °F (37.8 °C)] 98.9 °F (37.2 °C)  Pulse:  [102-115] 108  Resp:  [14-35] 19  SpO2:  [100 %] 100 %  BP: ()/(41-63) 99/49     Weight: 79.7 kg (175 lb 11.3 oz) (02/25/22 0500)  Body mass index is 24.51 kg/m².  Body surface area is 2 meters squared.    I/O last 3 completed shifts:  In: 700 [NG/GT:600; IV Piggyback:100]  Out: 1250 [Urine:600; Stool:650]    Physical Exam  Vitals reviewed.    HENT:      Head: Atraumatic.   Cardiovascular:      Rate and Rhythm: Regular rhythm.   Pulmonary:      Effort: Pulmonary effort is normal.      Comments: CPAP trials  Abdominal:      General: Abdomen is flat.   Neurological:      Mental Status: He is alert.       Significant Labs:  BMP:   Recent Labs   Lab 02/21/22  0824         K 5.1      CO2 24   BUN 81*   CREATININE 3.44*   CALCIUM 8.9        Significant Imaging:  Labs: Reviewed

## 2022-02-27 NOTE — PROGRESS NOTES
[facility]  Pulmonology  Progress Note    Patient Name: Og Garcia  MRN: [id number]  Admission Date: 12/23/2021  Hospital Length of Stay: 66 days  Code Status: Prior  Attending Provider: Cecil Abernatyh DO  Primary Care Provider: Hector Arndt DNP, FNP-C   Principal Problem: Denice gangrene    Subjective:     Interval History: awake, watching TV. No complaints     Objective:     Vital Signs (Most Recent):  Temp: 98.6 °F (37 °C) (02/27/22 1300)  Pulse: (!) 115 (02/27/22 1300)  Resp: (!) 28 (02/27/22 1300)  BP: 114/66 (02/27/22 1300)  SpO2: 100 % (02/27/22 1300)   Vital Signs (24h Range):  Temp:  [98.6 °F (37 °C)-100 °F (37.8 °C)] 98.6 °F (37 °C)  Pulse:  [103-115] 115  Resp:  [9-35] 28  SpO2:  [97 %-100 %] 100 %  BP: ()/(41-66) 114/66     Weight: 79.7 kg (175 lb 11.3 oz)  Body mass index is 24.51 kg/m².      Intake/Output Summary (Last 24 hours) at 2/27/2022 1452  Last data filed at 2/27/2022 1253  Gross per 24 hour   Intake 1060 ml   Output 1050 ml   Net 10 ml       Physical Exam  Vitals reviewed.   Constitutional:       General: He is not in acute distress.     Appearance: He is not ill-appearing.      Comments: Chronically ill appearing   HENT:      Head: Normocephalic and atraumatic.      Right Ear: External ear normal.      Left Ear: External ear normal.      Nose: Nose normal.      Mouth/Throat:      Mouth: Mucous membranes are moist.      Comments: trach  Eyes:      Conjunctiva/sclera: Conjunctivae normal.      Pupils: Pupils are equal, round, and reactive to light.   Cardiovascular:      Rate and Rhythm: Normal rate and regular rhythm.      Pulses: Normal pulses.      Heart sounds: Normal heart sounds.   Pulmonary:      Effort: No respiratory distress.      Breath sounds: Normal breath sounds.      Comments: clear breath sounds anteriorly  Abdominal:      Palpations: Abdomen is soft.   Musculoskeletal:      Cervical back: Neck supple.   Lymphadenopathy:       Cervical: No cervical adenopathy.   Skin:     General: Skin is warm and dry.      Coloration: Skin is not pale.   Neurological:      Mental Status: He is alert. Mental status is at baseline.      Comments: Moves spontaneously, tracks with good eye   Psychiatric:         Behavior: Behavior normal.       Vents:  Vent Mode: A/C (02/27/22 1221)  Set Rate: 14 BPM (02/27/22 0345)  Vt Set: 480 mL (02/27/22 0345)  Pressure Support: 12 cmH20 (02/27/22 1221)  PEEP/CPAP: 5 cmH20 (02/27/22 1221)  Oxygen Concentration (%): 35 (02/27/22 1221)  Peak Airway Pressure: 18 cmH2O (02/27/22 1221)  Total Ve: 15 mL (02/27/22 1221)  F/VT Ratio<105 (RSBI): (!) 73.96 (02/27/22 0345)    Lines/Drains/Airways       Peripherally Inserted Central Catheter Line  Duration             PICC Double Lumen 01/05/22 left basilic 53 days              Central Venous Catheter Line  Duration                  Hemodialysis Catheter 12/30/21 0900 right internal jugular 59 days              Drain  Duration                  NG/OG Tube [place] sump 18 Fr. Left nostril -- days         Colostomy 12/18/21 1030 Descending/sigmoid LUQ 71 days    Male External Urinary Catheter 02/21/22 Medium 6 days              Airway  Duration                  Surgical Airway 01/04/22 Shiley 54 days                    Significant Labs:    CBC/Anemia Profile:  No results for input(s): WBC, HGB, HCT, PLT, MCV, RDW, IRON, FERRITIN, RETIC, FOLATE, [id number], OCCULTBLOOD in the last 48 hours.     Chemistries:  No results for input(s): NA, K, CL, CO2, BUN, CREATININE, CALCIUM, ALBUMIN, PROT, BILITOT, ALKPHOS, ALT, AST, GLUCOSE, MG, PHOS in the last 48 hours.    All pertinent labs within the past 24 hours have been reviewed.    Significant Imaging:  I have reviewed all pertinent imaging results/findings within the past 24 hours.    Assessment/Plan:     * Denice gangrene  - s/p multiple debridements  - wound vac in place  - ID consulted for antibiotic management: He was treated with  rocephin, daptomycin, clindamycin. 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 Cazares  2/22- back to OR yesterday for skin grafting    Fever  2/26 -Tmax 101.4 -- resp culture - with GNB - will start Zosyn today - follow cultures   BC x 2 NGTD   2/27 - afebrile     Vomiting  Holding TFs this morning. Will resume this afternoon. PRN zofran   2/27- improved    ESRD (end stage renal disease)  Stated on HD 12/30   Continue with HD per nephrology  TTS     Disseminated mucormycosis  Defer to Dr. Hall on all antibiotic choices---Her note has been reviewed and is appreciated.     Amphotericin until 02/03   zosyn and gent for pseudomonal double coverage until 02/01 2/4 treatment completed    Type 2 diabetes mellitus without complication, without long-term current use of insulin  - continue ISS  - glucose stable/controlled  - fasting 183    Acute blood loss anemia  Has required multiple transfusions this admission  2/25 - 7/23    Necrotizing fasciitis  Ongoing issue    On mechanically assisted ventilation  - 12/14 intubated, tracheostomy 1/4  - Increase weaning trials, continues to have periods of apnea reported by RT on SIMV trials  - He is doing ok with current trials  - I am going to try him on PSV and see how he will do  2/24- Placed on CPAP/PSV this am during my exam and the patient is doing well.   2/25- plan for 2 hours of cpap tid today  2/27  tolerating CPAP -- increase as tolerated     Hypertension  - continue diltiazem  - bp is ok    RAHAT (acute kidney injury)-resolved as of 2/27/2022  - tunneled HD line placement 12/30  - Continue with dialysis per nephrology               Elinor Baker,  AG-ACNP  Pulmonology  [facility]

## 2022-02-28 NOTE — SUBJECTIVE & OBJECTIVE
Interval History: awake, watching TV. Tolerating CPAP     Objective:     Vital Signs (Most Recent):  Temp: 99.1 °F (37.3 °C) (02/28/22 0500)  Pulse: 96 (02/28/22 0600)  Resp: 17 (02/28/22 0600)  BP: (!) 115/57 (02/28/22 0600)  SpO2: 100 % (02/28/22 0600)   Vital Signs (24h Range):  Temp:  [98.3 °F (36.8 °C)-99.3 °F (37.4 °C)] 99.1 °F (37.3 °C)  Pulse:  [] 96  Resp:  [10-30] 17  SpO2:  [94 %-100 %] 100 %  BP: (101-115)/(49-57) 115/57     Weight: 79.7 kg (175 lb 11.3 oz)  Body mass index is 24.51 kg/m².      Intake/Output Summary (Last 24 hours) at 2/28/2022 1359  Last data filed at 2/28/2022 0600  Gross per 24 hour   Intake 1020 ml   Output 400 ml   Net 620 ml       Physical Exam  Vitals reviewed.   Constitutional:       General: He is not in acute distress.     Appearance: He is not ill-appearing.      Comments: Chronically ill appearing   HENT:      Right Ear: External ear normal.      Left Ear: External ear normal.      Nose: Nose normal.      Mouth/Throat:      Mouth: Mucous membranes are moist.      Comments: trach  Eyes:      Conjunctiva/sclera: Conjunctivae normal.      Pupils: Pupils are equal, round, and reactive to light.   Cardiovascular:      Rate and Rhythm: Normal rate and regular rhythm.      Pulses: Normal pulses.      Heart sounds: Normal heart sounds.   Pulmonary:      Effort: No respiratory distress.      Breath sounds: Normal breath sounds.      Comments: clear breath sounds anteriorly  Abdominal:      Palpations: Abdomen is soft.   Musculoskeletal:      Cervical back: Neck supple.   Lymphadenopathy:      Cervical: No cervical adenopathy.   Skin:     General: Skin is warm and dry.      Coloration: Skin is not pale.   Neurological:      Mental Status: He is alert. Mental status is at baseline.      Motor: Weakness present.      Comments: Moves spontaneously, tracks with good eye   Psychiatric:         Behavior: Behavior normal.       Vents:  Vent Mode: A/C (02/28/22 1054)  Set Rate: 14 BPM  (02/28/22 0800)  Vt Set: 480 mL (02/28/22 0800)  Pressure Support: 12 cmH20 (02/27/22 2318)  PEEP/CPAP: 5 cmH20 (02/28/22 1054)  Oxygen Concentration (%): 35 (02/28/22 1054)  Peak Airway Pressure: 12 cmH2O (02/28/22 1054)  Plateau Pressure: 11 cmH20 (02/28/22 0435)  Total Ve: 8.8 mL (02/28/22 1054)  F/VT Ratio<105 (RSBI): (!) 41.78 (02/27/22 1700)    Lines/Drains/Airways       Peripherally Inserted Central Catheter Line  Duration             PICC Double Lumen 01/05/22 left basilic 54 days              Central Venous Catheter Line  Duration                  Hemodialysis Catheter 12/30/21 0900 right internal jugular 60 days              Drain  Duration                  NG/OG Tube [place] sump 18 Fr. Left nostril -- days         Colostomy 12/18/21 1030 Descending/sigmoid LUQ 72 days    Male External Urinary Catheter 02/21/22 Medium 7 days              Airway  Duration                  Surgical Airway 01/04/22 Shiley 55 days                    Significant Labs:    CBC/Anemia Profile:  No results for input(s): WBC, HGB, HCT, PLT, MCV, RDW, IRON, FERRITIN, RETIC, FOLATE, [id number], OCCULTBLOOD in the last 48 hours.     Chemistries:  No results for input(s): NA, K, CL, CO2, BUN, CREATININE, CALCIUM, ALBUMIN, PROT, BILITOT, ALKPHOS, ALT, AST, GLUCOSE, MG, PHOS in the last 48 hours.    All pertinent labs within the past 24 hours have been reviewed.    Significant Imaging:  I have reviewed all pertinent imaging results/findings within the past 24 hours.

## 2022-02-28 NOTE — PLAN OF CARE
Problem: Adult Inpatient Plan of Care  Goal: Plan of Care Review  Outcome: Ongoing, Progressing  Goal: Patient-Specific Goal (Individualized)  Outcome: Ongoing, Progressing  Goal: Absence of Hospital-Acquired Illness or Injury  Outcome: Ongoing, Progressing  Goal: Optimal Comfort and Wellbeing  Outcome: Ongoing, Progressing  Goal: Readiness for Transition of Care  Outcome: Ongoing, Progressing     Problem: Adjustment to Illness (Sepsis/Septic Shock)  Goal: Optimal Coping  Outcome: Ongoing, Progressing     Problem: Bleeding (Sepsis/Septic Shock)  Goal: Absence of Bleeding  Outcome: Ongoing, Progressing     Problem: Glycemic Control Impaired (Sepsis/Septic Shock)  Goal: Blood Glucose Level Within Desired Range  Outcome: Ongoing, Progressing     Problem: Infection Progression (Sepsis/Septic Shock)  Goal: Absence of Infection Signs and Symptoms  Outcome: Ongoing, Progressing     Problem: Nutrition Impaired (Sepsis/Septic Shock)  Goal: Optimal Nutrition Intake  Outcome: Ongoing, Progressing     Problem: Fluid and Electrolyte Imbalance (Acute Kidney Injury/Impairment)  Goal: Fluid and Electrolyte Balance  Outcome: Ongoing, Progressing     Problem: Oral Intake Inadequate (Acute Kidney Injury/Impairment)  Goal: Optimal Nutrition Intake  Outcome: Ongoing, Progressing     Problem: Renal Function Impairment (Acute Kidney Injury/Impairment)  Goal: Effective Renal Function  Outcome: Ongoing, Progressing     Problem: Infection  Goal: Absence of Infection Signs and Symptoms  Outcome: Ongoing, Progressing     Problem: Fall Injury Risk  Goal: Absence of Fall and Fall-Related Injury  Outcome: Ongoing, Progressing     Problem: Communication Impairment (Mechanical Ventilation, Invasive)  Goal: Effective Communication  Outcome: Ongoing, Progressing     Problem: Device-Related Complication Risk (Mechanical Ventilation, Invasive)  Goal: Optimal Device Function  Outcome: Ongoing, Progressing     Problem: Inability to Wean (Mechanical  Ventilation, Invasive)  Goal: Mechanical Ventilation Liberation  Outcome: Ongoing, Progressing     Problem: Nutrition Impairment (Mechanical Ventilation, Invasive)  Goal: Optimal Nutrition Delivery  Outcome: Ongoing, Progressing     Problem: Skin and Tissue Injury (Mechanical Ventilation, Invasive)  Goal: Absence of Device-Related Skin and Tissue Injury  Outcome: Ongoing, Progressing     Problem: Ventilator-Induced Lung Injury (Mechanical Ventilation, Invasive)  Goal: Absence of Ventilator-Induced Lung Injury  Outcome: Ongoing, Progressing     Problem: Communication Impairment (Artificial Airway)  Goal: Effective Communication  Outcome: Ongoing, Progressing     Problem: Device-Related Complication Risk (Artificial Airway)  Goal: Optimal Device Function  Outcome: Ongoing, Progressing     Problem: Skin and Tissue Injury (Artificial Airway)  Goal: Absence of Device-Related Skin or Tissue Injury  Outcome: Ongoing, Progressing     Problem: Noninvasive Ventilation Acute  Goal: Effective Unassisted Ventilation and Oxygenation  Outcome: Ongoing, Progressing     Problem: Skin Injury Risk Increased  Goal: Skin Health and Integrity  Outcome: Ongoing, Progressing     Problem: Device-Related Complication Risk (Hemodialysis)  Goal: Safe, Effective Therapy Delivery  Outcome: Ongoing, Progressing     Problem: Hemodynamic Instability (Hemodialysis)  Goal: Effective Tissue Perfusion  Outcome: Ongoing, Progressing     Problem: Infection (Hemodialysis)  Goal: Absence of Infection Signs and Symptoms  Outcome: Ongoing, Progressing     Problem: Diabetes Comorbidity  Goal: Blood Glucose Level Within Targeted Range  Outcome: Ongoing, Progressing     Problem: Impaired Wound Healing  Goal: Optimal Wound Healing  Outcome: Ongoing, Progressing     Problem: Gas Exchange Impaired  Goal: Optimal Gas Exchange  Outcome: Ongoing, Progressing

## 2022-02-28 NOTE — DIALYSIS ROUNDING
"          Nephrology Department            NAME: Og Garcia   YOB: 1955  MRN: [id number]  NOTE DATE: 02/28/2022       Seen on dialysis in TRAV.  He is tolerating the procedure.    Patient complains:  None.      REVIEW OF SYSTEMS:  he reports no shortness of breath, nausea or vomiting. No acute changes.    VITALS:  height is 5' 11" (1.803 m) and weight is 79.7 kg (175 lb 11.3 oz). His temperature is 99.1 °F (37.3 °C). His blood pressure is 116/62 and his pulse is 106. His respiration is 30 (abnormal) and oxygen saturation is 71% (abnormal).  Hemodialysis documentation flowsheet reviewed with dialysis nurse, including weight and vitals.    Resting comfortably, NAD.  Respiration unlabored. Lungs clear.  Trach collar trials  Heart regular, no rub.  Abdomen soft, nontender, positive bowl sounds  No edema.    No results for input(s): NA, K, CL, CO2, BUN, CREATININE, GLUCOSE, CALCIUM, PHOS in the last 168 hours.    Invalid input(s): EGFR, LABALBU  Recent Labs   Lab 02/25/22  1025   WBC 23.45*   HGB 7.8*   HCT 23.2*   *       ASSESSMENT/PLAN:  Continue with scheduled hemodialysis for this patient.    Edwin Pryor Jr, MD  " Yes

## 2022-02-28 NOTE — PROGRESS NOTES
[facility]  Pulmonology  Progress Note    Patient Name: Og Garcia  MRN: [id number]  Admission Date: 12/23/2021  Hospital Length of Stay: 67 days  Code Status: Prior  Attending Provider: Cecil Abernathy DO  Primary Care Provider: Hector Arndt DNP, FNP-C   Principal Problem: Denice gangrene    Subjective:     Interval History: awake, watching TV. Tolerating CPAP     Objective:     Vital Signs (Most Recent):  Temp: 99.1 °F (37.3 °C) (02/28/22 0500)  Pulse: 96 (02/28/22 0600)  Resp: 17 (02/28/22 0600)  BP: (!) 115/57 (02/28/22 0600)  SpO2: 100 % (02/28/22 0600)   Vital Signs (24h Range):  Temp:  [98.3 °F (36.8 °C)-99.3 °F (37.4 °C)] 99.1 °F (37.3 °C)  Pulse:  [] 96  Resp:  [10-30] 17  SpO2:  [94 %-100 %] 100 %  BP: (101-115)/(49-57) 115/57     Weight: 79.7 kg (175 lb 11.3 oz)  Body mass index is 24.51 kg/m².      Intake/Output Summary (Last 24 hours) at 2/28/2022 1359  Last data filed at 2/28/2022 0600  Gross per 24 hour   Intake 1020 ml   Output 400 ml   Net 620 ml       Physical Exam  Vitals reviewed.   Constitutional:       General: He is not in acute distress.     Appearance: He is not ill-appearing.      Comments: Chronically ill appearing   HENT:      Right Ear: External ear normal.      Left Ear: External ear normal.      Nose: Nose normal.      Mouth/Throat:      Mouth: Mucous membranes are moist.      Comments: trach  Eyes:      Conjunctiva/sclera: Conjunctivae normal.      Pupils: Pupils are equal, round, and reactive to light.   Cardiovascular:      Rate and Rhythm: Normal rate and regular rhythm.      Pulses: Normal pulses.      Heart sounds: Normal heart sounds.   Pulmonary:      Effort: No respiratory distress.      Breath sounds: Normal breath sounds.      Comments: clear breath sounds anteriorly  Abdominal:      Palpations: Abdomen is soft.   Musculoskeletal:      Cervical back: Neck supple.   Lymphadenopathy:      Cervical: No cervical adenopathy.   Skin:      General: Skin is warm and dry.      Coloration: Skin is not pale.   Neurological:      Mental Status: He is alert. Mental status is at baseline.      Motor: Weakness present.      Comments: Moves spontaneously, tracks with good eye   Psychiatric:         Behavior: Behavior normal.       Vents:  Vent Mode: A/C (02/28/22 1054)  Set Rate: 14 BPM (02/28/22 0800)  Vt Set: 480 mL (02/28/22 0800)  Pressure Support: 12 cmH20 (02/27/22 2318)  PEEP/CPAP: 5 cmH20 (02/28/22 1054)  Oxygen Concentration (%): 35 (02/28/22 1054)  Peak Airway Pressure: 12 cmH2O (02/28/22 1054)  Plateau Pressure: 11 cmH20 (02/28/22 0435)  Total Ve: 8.8 mL (02/28/22 1054)  F/VT Ratio<105 (RSBI): (!) 41.78 (02/27/22 1700)    Lines/Drains/Airways       Peripherally Inserted Central Catheter Line  Duration             PICC Double Lumen 01/05/22 left basilic 54 days              Central Venous Catheter Line  Duration                  Hemodialysis Catheter 12/30/21 0900 right internal jugular 60 days              Drain  Duration                  NG/OG Tube Lindrith sump 18 Fr. Left nostril -- days         Colostomy 12/18/21 1030 Descending/sigmoid LUQ 72 days    Male External Urinary Catheter 02/21/22 Medium 7 days              Airway  Duration                  Surgical Airway 01/04/22 Shiley 55 days                    Significant Labs:    CBC/Anemia Profile:  No results for input(s): WBC, HGB, HCT, PLT, MCV, RDW, IRON, FERRITIN, RETIC, FOLATE, [id number], OCCULTBLOOD in the last 48 hours.     Chemistries:  No results for input(s): NA, K, CL, CO2, BUN, CREATININE, CALCIUM, ALBUMIN, PROT, BILITOT, ALKPHOS, ALT, AST, GLUCOSE, MG, PHOS in the last 48 hours.    All pertinent labs within the past 24 hours have been reviewed.    Significant Imaging:  I have reviewed all pertinent imaging results/findings within the past 24 hours.    Assessment/Plan:     Fever  2/26 -Tmax 101.4 -- resp culture - with GNB - will start Zosyn today - follow cultures   BC x 2 NGTD    2/28 - resp culture with pseudomonas and klebsiella  treat with zosyn for 7 days     ESRD (end stage renal disease)  Stated on HD 12/30   Continue with HD per nephrology  TTS     Acute blood loss anemia  Has required multiple transfusions this admission  2/25 - 7/23    On mechanically assisted ventilation  - 12/14 intubated, tracheostomy 1/4 2/28  tolerating CPAP -- increase to 4 hours TID                 FLORESITA Shafer-ACNP  Pulmonology  [facility]

## 2022-02-28 NOTE — RESPIRATORY THERAPY
10:36 Placed patient on CPAP 10, 35%+5.    14:54 Patient completed weaning trial. Tolerated well: , RR 29, O2 100%, /58.

## 2022-02-28 NOTE — PROGRESS NOTES
[facility]  Adult Nutrition  Follow-up Note         Reason for Assessment   Reason For Assessment: RD follow-up  Nutrition Risk Screen: tube feeding or parenteral nutrition  Malnutrition  Is Patient Malnourished: No  Nutrition Diagnosis  Increased protein Needs   related to Decreased/ impaired skin integrity as evidenced by wounds    Nutrition Diagnosis Status: Improving      Nutrition Risk  Level of Risk/Frequency of Follow-up: high   Chewing or Swallowing Difficulty?: Swallowing difficulty  Estimated/Assessed Needs  RMR ([place]-St. Jeor Equation): 1599.13 Activity Factor: 1 Injury Factor: 1.2   Total Ve: 8.8 mL Temp: 99.1 °F (37.3 °C)Axillary  Weight Used For Calorie Calculations: 79.7 kg (175 lb 11.3 oz)   Energy Need Method: [facility] (modified) Energy Calorie Requirements (kcal): 2150  Weight Used For Protein Calculations: 79.1 kg (174 lb 6.1 oz)  Protein Requirements:   Estimated Fluid Requirement Method: RDA Method Fluid Requirements (mL): 1971  RDA Method (mL): 2150     Nutrition Prescription / Recommendations  Recommendation/Intervention: Tube Feeding: Vital AF @ 75ml/hr; H20 flush of 50ml q 4hr  Goals: pt will meet estimated nutritional needs; wound healing, TF tolerance  Nutrition Goal Status: progressing towards goal  Communication of RD Recs: reviewed with physician  Current Diet Order: NPO  Nutrition Order Comments: Tube Feeding: Vital AF @ 75ml/hr; H20 flush of 50ml q 4hr  Current Nutrition Support Formula Ordered: Vital AF 1.2  Current Nutrition Support Rate Ordered: 75 (ml)  Current Nutrition Support Frequency Ordered: hourly  Recommended Diet: Enteral Nutrition Tube Feeding: Vital AF @ 75ml/hr; H20 flush of 50ml q 4hr  Recommended Oral Supplement: No Oral Supplements  Is Nutrition Support Recommended: No  Is Education Recommended: No  Monitor and Evaluation  % current Intake: Enteral Nutrition at goal  % intake to meet estimated needs: Enteral Nutrition   Food and  Nutrient Intake: enteral nutrition intake  Food and Nutrient Adminstration: enteral and parenteral nutrition administration  Anthropometric Measurements: height/length, weight, weight change  Biochemical Data, Medical Tests and Procedures: electrolyte and renal panel, glucose/endocrine profile  Nutrition-Focused Physical Findings: skin  Enteral Calories (kcal): 2160  Enteral Protein (gm): 135  Enteral (Free Water) Fluid (mL): 1458  Free Water Flush Fluid (mL): 300  Parenteral Calories (kcal): 845  Parenteral Protein (gm): 48  Parenteral Fluid (mL): 960  Lipid Calories (kcals): 500 kcals  Other Calories (kcal): 528 (propofol)  Total Calories (kcal): 2160  Total Calories (kcal/kg): 2612  % Kcal Needs: 100  Total Protein (gm): 135  % Protein Needs: 100  IV Fluid (mL): 1764  Total Fluid Intake (mL): 1758  Energy Calories Required: meeting needs  Protein Required: meeting needs  Fluid Required: meeting needs  Tolerance: tolerating  Current Medical Diagnosis and Past Medical History  Diagnosis: gastrointestinal disease, infection/sepsis  Past Medical History:   Diagnosis Date    Hyperlipidemia     Hypertension      Nutrition/Diet History  Spiritual, Cultural Beliefs, Episcopal Practices, Values that Affect Care: no  Food Allergies: NKFA  Factors Affecting Nutritional Intake: None identified at this time  Lab/Procedures/Meds  No results for input(s): NA, K, BUN, CREATININE, GLU, CALCIUM, ALBUMIN, CL, ALT, AST, PHOS in the last 72 hours.  Last A1c: No results found for: HGBA1C  Lab Results   Component Value Date    RBC 2.61 (L) 02/25/2022    HGB 7.8 (L) 02/25/2022    HCT 23.2 (L) 02/25/2022    MCV 88.9 02/25/2022    MCH 29.9 02/25/2022    MCHC 33.6 02/25/2022     Pertinent Labs Reviewed: reviewed  Pertinent Labs Comments: Hct 23.2, BUN 81, Cr 3.44, Alb 1.3  Pertinent Medications Reviewed: reviewed  Pertinent Medications Comments: heparin, insulin  Anthropometrics  Temp: 99.1 °F (37.3 °C)  Height Method: Stated  Height: 5'  "11" (180.3 cm)  Height (inches): 71 in  Weight Method: Bed Scale  Weight: 79.7 kg (175 lb 11.3 oz)  Weight (lb): 175.71 lb  Ideal Body Weight (IBW), Male: 172 lb  % Ideal Body Weight, Male (lb): 111 %  BMI (Calculated): 24.5  BMI Grade: 25 - 29.9 - overweight     Nutrition by Nursing  Diet/Nutrition Received: tube feeding     Diet/Feeding Assistance: none  Diet/Feeding Tolerance: good  Last Bowel Movement: 02/28/22       NG/OG Tube Rogers sump 18 Fr. Left nostril-Feeding Type: continuous, by pump       NG/OG Tube Rogers sump 18 Fr. Left nostril-Current Rate (mL/hr): 75 mL/hr       NG/OG Tube Rogers sump 18 Fr. Left nostril-Goal Rate (mL/hr): 75 mL/hr       NG/OG Tube Rogers sump 18 Fr. Left nostril-Formula Name: Vital AF  Nutrition Follow-Up  RD Follow-up?: Yes  Assessment and Plan  No new Assessment & Plan notes have been filed under this hospital service since the last note was generated.  Service: Nutrition     "

## 2022-02-28 NOTE — ASSESSMENT & PLAN NOTE
2/26 -Tmax 101.4 -- resp culture - with GNB - will start Zosyn today - follow cultures   BC x 2 NGTD   2/28 - resp culture with pseudomonas and klebsiella  treat with zosyn for 7 days

## 2022-03-01 NOTE — ASSESSMENT & PLAN NOTE
Continue with scheduled hemodialysis for this patient.  2/24/2021  The patient is doing well with dialysis.  Continue with scheduled hemodialysis for this patient.  2/25/2022.  The patient tolerates scheduled hemodialysis.  2/26/2022 Continuing with dialysis as scheduled  2/27/2022.  Dialysis as scheduled.  3/1/2022  At this time, continue with dialysis management.

## 2022-03-01 NOTE — SUBJECTIVE & OBJECTIVE
Interval History: The patient is resting.  No acute changes.  He tolerated dialysis on yesterday.  Continuing with CPAP trials.    Review of patient's allergies indicates:  No Known Allergies  Current Facility-Administered Medications   Medication Frequency    0.9%  NaCl infusion (for blood administration) Q24H PRN    0.9%  NaCl infusion PRN    acetaminophen tablet 500 mg Q6H PRN    albuterol nebulizer solution 2.5 mg Q4H PRN    bisacodyL EC tablet 10 mg Daily PRN    calcium gluconate 1 g in dextrose 5 % in water (D5W) 5 % 100 mL IVPB (MB+) Once    dextromethorphan-guaiFENesin  mg/5 ml liquid 10 mL Q6H PRN    dextrose 50% injection 12.5 g PRN    diltiaZEM tablet 60 mg Q6H    fentaNYL 50 mcg/hr 1 patch Q72H    glucagon (human recombinant) injection 1 mg PRN    guaiFENesin 100 mg/5 ml syrup 200 mg Q6H    heparin (porcine) injection 4,000 Units PRN    insulin aspart U-100 injection 1-10 Units Q6H    lorazepam injection 2 mg Q6H PRN    ondansetron injection 8 mg Q6H PRN    pantoprazole suspension 40 mg Daily    piperacillin-tazobactam (ZOSYN) 4.5 g in dextrose 5 % in water (D5W) 5 % 100 mL IVPB (MB+) Q12H    sevelamer carbonate pwpk 0.8 g TID WM    silver sulfADIAZINE 1% cream PRN    simethicone chewable tablet 80 mg TID PRN    ticagrelor tablet 90 mg BID       Objective:     Vital Signs (Most Recent):  Temp: 99.4 °F (37.4 °C) (03/01/22 0300)  Pulse: 103 (03/01/22 0600)  Resp: (!) 33 (03/01/22 1346)  BP: 121/62 (03/01/22 0632)  SpO2: 100 % (03/01/22 0600)  O2 Device (Oxygen Therapy): ventilator (03/01/22 0734)   Vital Signs (24h Range):  Temp:  [99.2 °F (37.3 °C)-99.4 °F (37.4 °C)] 99.4 °F (37.4 °C)  Pulse:  [] 103  Resp:  [10-33] 33  SpO2:  [100 %] 100 %  BP: (105-129)/(54-65) 121/62     Weight: 80.3 kg (177 lb 0.5 oz) (03/01/22 0600)  Body mass index is 24.69 kg/m².  Body surface area is 2.01 meters squared.    I/O last 3 completed shifts:  In: 1650 [Other:500; NG/GT:1050; IV Piggyback:100]  Out: 3050  [Urine:60; Other:2500; Stool:490]    Physical Exam  Vitals reviewed.   HENT:      Head: Atraumatic.      Mouth/Throat:      Mouth: Mucous membranes are dry.   Cardiovascular:      Rate and Rhythm: Regular rhythm.   Pulmonary:      Breath sounds: Normal breath sounds.   Abdominal:      Palpations: Abdomen is soft.      Comments: NG tube.   Neurological:      Mental Status: He is alert.       Significant Labs:  BMP: No results for input(s): GLU, NA, K, CL, CO2, BUN, CREATININE, CALCIUM, MG in the last 168 hours.  CBC:   Recent Labs   Lab 02/25/22  1025   WBC 23.45*   RBC 2.61*   HGB 7.8*   HCT 23.2*   *   MCV 88.9   MCH 29.9   MCHC 33.6        Significant Imaging:  Labs: Reviewed

## 2022-03-01 NOTE — NURSING
1950-Lying in bed with HOB^. Resp even et unlabored. L nare SS infusing Vital AF @ 75 ml/hr w/o diff.Vent settings: AC 14. . Fi02 35%. P-5-via trach. R SC HD cath intact. L Picc line INT. Fentanyl patch noted to L shoulder. L Picc line INT. Colostomy noted to L ABD. Drsg noted to R abd.Condom cath in use. Drsg noted to BLE. WB et SCD noted to BLE. Able to nod head to yes/no questions. Oral care done. Repositioned for comfort. Safety measures on-going.    2355-No acute changes noted from initial assessment.Pn Cpap 10-5-35%. Will monitor for resp. Distress. Safety measures on-going.    0400-Physical assessment complete. Quiet hrs. Resp unlabored. Still on Cpap. Bathe. Linen changed. Oral et skin care done. Condom cath intact. Safety measures on-going.     0610-Suctioned copious amt of secretions orally et via trach. Repositioned for comfort. Safety measures on-going.

## 2022-03-01 NOTE — PROGRESS NOTES
[facility]  Pulmonology  Progress Note    Patient Name: Og Garcia  MRN: [id number]  Admission Date: 12/23/2021  Hospital Length of Stay: 68 days  Code Status: Prior  Attending Provider: Cecil Abernathy DO  Primary Care Provider: Hector Arndt DNP, FNP-C   Principal Problem: Denice gangrene    Subjective:     Interval History: No acute events overnight. Currently hemodynamically stable. Oxygenating adequately on the ventilator. Currently afebrile. Doing well with breathing trials.    Objective:     Vital Signs (Most Recent):  Temp: 99.4 °F (37.4 °C) (03/01/22 0300)  Pulse: 103 (03/01/22 0600)  Resp: (!) 30 (03/01/22 0600)  BP: 121/62 (03/01/22 0632)  SpO2: 100 % (03/01/22 0600) Vital Signs (24h Range):  Temp:  [99.2 °F (37.3 °C)-99.4 °F (37.4 °C)] 99.4 °F (37.4 °C)  Pulse:  [] 103  Resp:  [10-32] 30  SpO2:  [71 %-100 %] 100 %  BP: (105-129)/(53-65) 121/62     Weight: 80.3 kg (177 lb 0.5 oz)  Body mass index is 24.69 kg/m².      Intake/Output Summary (Last 24 hours) at 3/1/2022 1159  Last data filed at 3/1/2022 0600  Gross per 24 hour   Intake 1650 ml   Output 2750 ml   Net -1100 ml         Physical Exam  Vitals reviewed.   Constitutional:       General: He is not in acute distress.     Appearance: He is not ill-appearing.      Comments: Chronically ill appearing   HENT:      Head: Normocephalic and atraumatic.      Right Ear: External ear normal.      Left Ear: External ear normal.      Nose: Nose normal.      Mouth/Throat:      Mouth: Mucous membranes are moist.      Comments: trach  Eyes:      Conjunctiva/sclera: Conjunctivae normal.      Pupils: Pupils are equal, round, and reactive to light.   Cardiovascular:      Rate and Rhythm: Regular rhythm. Tachycardia present.      Pulses: Normal pulses.      Heart sounds: Normal heart sounds.   Pulmonary:      Effort: No respiratory distress.      Comments: clear breath sounds anteriorly  Abdominal:      Palpations: Abdomen is  soft.   Musculoskeletal:         General: Normal range of motion.      Cervical back: Neck supple.   Lymphadenopathy:      Cervical: No cervical adenopathy.   Skin:     General: Skin is warm and dry.      Coloration: Skin is not pale.   Neurological:      Mental Status: Mental status is at baseline.      Comments: Moves spontaneously, tracks with good eye   Psychiatric:         Behavior: Behavior normal.       Vents:  Vent Mode: CPAP/PSV (03/01/22 0734)  Set Rate: 0 BPM (03/01/22 0502)  Vt Set: 0 mL (03/01/22 0502)  Pressure Support: 12 cmH20 (03/01/22 0734)  PEEP/CPAP: 5 cmH20 (03/01/22 0734)  Oxygen Concentration (%): 35 (03/01/22 0734)  Peak Airway Pressure: 17 cmH2O (03/01/22 0734)  Plateau Pressure: 11 cmH20 (02/28/22 0435)  Total Ve: 9.6 mL (03/01/22 0734)  F/VT Ratio<105 (RSBI): 110.67 (02/28/22 1615)    Lines/Drains/Airways       Peripherally Inserted Central Catheter Line  Duration             PICC Double Lumen 01/05/22 left basilic 55 days              Central Venous Catheter Line  Duration                  Hemodialysis Catheter 12/30/21 0900 right internal jugular 61 days              Drain  Duration                  NG/OG Tube [place] sump 18 Fr. Left nostril -- days         Colostomy 12/18/21 1030 Descending/sigmoid LUQ 73 days    Male External Urinary Catheter 02/21/22 Medium 8 days              Airway  Duration                  Surgical Airway 01/04/22 Shiley 56 days                    Significant Labs:    CBC/Anemia Profile:  No results for input(s): WBC, HGB, HCT, PLT, MCV, RDW, IRON, FERRITIN, RETIC, FOLATE, [id number], OCCULTBLOOD in the last 48 hours.       Chemistries:  No results for input(s): NA, K, CL, CO2, BUN, CREATININE, CALCIUM, ALBUMIN, PROT, BILITOT, ALKPHOS, ALT, AST, GLUCOSE, MG, PHOS in the last 48 hours.      All pertinent labs within the past 24 hours have been reviewed.    Significant Imaging:  I have reviewed all pertinent imaging results/findings within the past 24  hours.    Assessment/Plan:     * Denice gangrene  - s/p multiple debridements  - wound vac in place  - ID consulted for antibiotic management: He was treated with rocephin, daptomycin, clindamycin. 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 Cazares  2/22- back to OR yesterday for skin grafting    Fever  2/26 -Tmax 101.4 -- resp culture - with GNB - will start Zosyn today - follow cultures   BC x 2 NGTD   2/28 - resp culture with pseudomonas and klebsiella  treat with zosyn for 7 days     ESRD (end stage renal disease)  Stated on HD 12/30   Continue with HD per nephrology  TTS     Type 2 diabetes mellitus without complication, without long-term current use of insulin  - continue ISS  - glucose stable/controlled  -     Acute blood loss anemia  Has required multiple transfusions this admission  2/25 - 7/23  Most recent Hgb is 7.8    On mechanically assisted ventilation  - 12/14 intubated, tracheostomy 1/4 2/28  tolerating CPAP -- increase to 4 hours TID  3/1- Doing well on PSV/CPAP. Will plan for 4 hours tid again today and increase tomorrow     Hypertension  - continue diltiazem  - bp is ok                 Cecil Abernathy, DO  Pulmonology  [facility]

## 2022-03-01 NOTE — PROGRESS NOTES
[facility]  Nephrology  Progress Note    Patient Name: Og Garcia  MRN: [id number]  Admission Date: 12/23/2021  Hospital Length of Stay: 68 days  Attending Provider: Cecil Abernathy DO   Primary Care Physician: Hector Arndt DNP, FNP-C  Principal Problem:Denice gangrene    Subjective:     HPI: The patient is known from the previous nephrology consult at [facility].  He is no at Specialty and continues to need dialysis support.      Interval History: The patient is resting.  No acute changes.  He tolerated dialysis on yesterday.  Continuing with CPAP trials.    Review of patient's allergies indicates:  No Known Allergies  Current Facility-Administered Medications   Medication Frequency    0.9%  NaCl infusion (for blood administration) Q24H PRN    0.9%  NaCl infusion PRN    acetaminophen tablet 500 mg Q6H PRN    albuterol nebulizer solution 2.5 mg Q4H PRN    bisacodyL EC tablet 10 mg Daily PRN    calcium gluconate 1 g in dextrose 5 % in water (D5W) 5 % 100 mL IVPB (MB+) Once    dextromethorphan-guaiFENesin  mg/5 ml liquid 10 mL Q6H PRN    dextrose 50% injection 12.5 g PRN    diltiaZEM tablet 60 mg Q6H    fentaNYL 50 mcg/hr 1 patch Q72H    glucagon (human recombinant) injection 1 mg PRN    guaiFENesin 100 mg/5 ml syrup 200 mg Q6H    heparin (porcine) injection 4,000 Units PRN    insulin aspart U-100 injection 1-10 Units Q6H    lorazepam injection 2 mg Q6H PRN    ondansetron injection 8 mg Q6H PRN    pantoprazole suspension 40 mg Daily    piperacillin-tazobactam (ZOSYN) 4.5 g in dextrose 5 % in water (D5W) 5 % 100 mL IVPB (MB+) Q12H    sevelamer carbonate pwpk 0.8 g TID WM    silver sulfADIAZINE 1% cream PRN    simethicone chewable tablet 80 mg TID PRN    ticagrelor tablet 90 mg BID       Objective:     Vital Signs (Most Recent):  Temp: 99.4 °F (37.4 °C) (03/01/22 0300)  Pulse: 103 (03/01/22 0600)  Resp: (!) 33 (03/01/22 1346)  BP: 121/62 (03/01/22 0632)  SpO2:  100 % (03/01/22 0600)  O2 Device (Oxygen Therapy): ventilator (03/01/22 0739)   Vital Signs (24h Range):  Temp:  [99.2 °F (37.3 °C)-99.4 °F (37.4 °C)] 99.4 °F (37.4 °C)  Pulse:  [] 103  Resp:  [10-33] 33  SpO2:  [100 %] 100 %  BP: (105-129)/(54-65) 121/62     Weight: 80.3 kg (177 lb 0.5 oz) (03/01/22 0600)  Body mass index is 24.69 kg/m².  Body surface area is 2.01 meters squared.    I/O last 3 completed shifts:  In: 1650 [Other:500; NG/GT:1050; IV Piggyback:100]  Out: 3050 [Urine:60; Other:2500; Stool:490]    Physical Exam  Vitals reviewed.   HENT:      Head: Atraumatic.      Mouth/Throat:      Mouth: Mucous membranes are dry.   Cardiovascular:      Rate and Rhythm: Regular rhythm.   Pulmonary:      Breath sounds: Normal breath sounds.   Abdominal:      Palpations: Abdomen is soft.      Comments: NG tube.   Neurological:      Mental Status: He is alert.       Significant Labs:  BMP: No results for input(s): GLU, NA, K, CL, CO2, BUN, CREATININE, CALCIUM, MG in the last 168 hours.  CBC:   Recent Labs   Lab 02/25/22  1025   WBC 23.45*   RBC 2.61*   HGB 7.8*   HCT 23.2*   *   MCV 88.9   MCH 29.9   MCHC 33.6        Significant Imaging:  Labs: Reviewed    Assessment/Plan:     ESRD (end stage renal disease)  Continue with scheduled hemodialysis for this patient.  2/24/2021  The patient is doing well with dialysis.  Continue with scheduled hemodialysis for this patient.  2/25/2022.  The patient tolerates scheduled hemodialysis.  2/26/2022 Continuing with dialysis as scheduled  2/27/2022.  Dialysis as scheduled.  3/1/2022  At this time, continue with dialysis management.        Thank you for your consult. I will follow-up with patient. Please contact us if you have any additional questions.    Edwin Pryor Jr, MD  Nephrology  [facility]

## 2022-03-01 NOTE — SUBJECTIVE & OBJECTIVE
Interval History: No acute events overnight. Currently hemodynamically stable. Oxygenating adequately on the ventilator. Currently afebrile. Doing well with breathing trials.    Objective:     Vital Signs (Most Recent):  Temp: 99.4 °F (37.4 °C) (03/01/22 0300)  Pulse: 103 (03/01/22 0600)  Resp: (!) 30 (03/01/22 0600)  BP: 121/62 (03/01/22 0632)  SpO2: 100 % (03/01/22 0600) Vital Signs (24h Range):  Temp:  [99.2 °F (37.3 °C)-99.4 °F (37.4 °C)] 99.4 °F (37.4 °C)  Pulse:  [] 103  Resp:  [10-32] 30  SpO2:  [71 %-100 %] 100 %  BP: (105-129)/(53-65) 121/62     Weight: 80.3 kg (177 lb 0.5 oz)  Body mass index is 24.69 kg/m².      Intake/Output Summary (Last 24 hours) at 3/1/2022 1159  Last data filed at 3/1/2022 0600  Gross per 24 hour   Intake 1650 ml   Output 2750 ml   Net -1100 ml         Physical Exam  Vitals reviewed.   Constitutional:       General: He is not in acute distress.     Appearance: He is not ill-appearing.      Comments: Chronically ill appearing   HENT:      Head: Normocephalic and atraumatic.      Right Ear: External ear normal.      Left Ear: External ear normal.      Nose: Nose normal.      Mouth/Throat:      Mouth: Mucous membranes are moist.      Comments: trach  Eyes:      Conjunctiva/sclera: Conjunctivae normal.      Pupils: Pupils are equal, round, and reactive to light.   Cardiovascular:      Rate and Rhythm: Regular rhythm. Tachycardia present.      Pulses: Normal pulses.      Heart sounds: Normal heart sounds.   Pulmonary:      Effort: No respiratory distress.      Comments: clear breath sounds anteriorly  Abdominal:      Palpations: Abdomen is soft.   Musculoskeletal:         General: Normal range of motion.      Cervical back: Neck supple.   Lymphadenopathy:      Cervical: No cervical adenopathy.   Skin:     General: Skin is warm and dry.      Coloration: Skin is not pale.   Neurological:      Mental Status: Mental status is at baseline.      Comments: Moves spontaneously, tracks with  good eye   Psychiatric:         Behavior: Behavior normal.       Vents:  Vent Mode: CPAP/PSV (03/01/22 0734)  Set Rate: 0 BPM (03/01/22 0502)  Vt Set: 0 mL (03/01/22 0502)  Pressure Support: 12 cmH20 (03/01/22 0734)  PEEP/CPAP: 5 cmH20 (03/01/22 0734)  Oxygen Concentration (%): 35 (03/01/22 0734)  Peak Airway Pressure: 17 cmH2O (03/01/22 0734)  Plateau Pressure: 11 cmH20 (02/28/22 0435)  Total Ve: 9.6 mL (03/01/22 0734)  F/VT Ratio<105 (RSBI): 110.67 (02/28/22 1615)    Lines/Drains/Airways       Peripherally Inserted Central Catheter Line  Duration             PICC Double Lumen 01/05/22 left basilic 55 days              Central Venous Catheter Line  Duration                  Hemodialysis Catheter 12/30/21 0900 right internal jugular 61 days              Drain  Duration                  NG/OG Tube [place] sump 18 Fr. Left nostril -- days         Colostomy 12/18/21 1030 Descending/sigmoid LUQ 73 days    Male External Urinary Catheter 02/21/22 Medium 8 days              Airway  Duration                  Surgical Airway 01/04/22 Shiley 56 days                    Significant Labs:    CBC/Anemia Profile:  No results for input(s): WBC, HGB, HCT, PLT, MCV, RDW, IRON, FERRITIN, RETIC, FOLATE, [id number], OCCULTBLOOD in the last 48 hours.       Chemistries:  No results for input(s): NA, K, CL, CO2, BUN, CREATININE, CALCIUM, ALBUMIN, PROT, BILITOT, ALKPHOS, ALT, AST, GLUCOSE, MG, PHOS in the last 48 hours.      All pertinent labs within the past 24 hours have been reviewed.    Significant Imaging:  I have reviewed all pertinent imaging results/findings within the past 24 hours.

## 2022-03-02 PROBLEM — R11.10 VOMITING: Status: RESOLVED | Noted: 2022-01-01 | Resolved: 2022-01-01

## 2022-03-02 NOTE — NURSING
Dr. Abernathy and THIERRY Schneider NP in to see patient. They were made aware that patient is not as alert today as usual. Will continue to monitor. Dialysis still in going.

## 2022-03-02 NOTE — NURSING
1925-Lying in bed with HOB^. Resp even et unlabored. L nare SS infusing Vital AF @ 75 ml/hr w/o diff-Checked residual-zero returned. Trach intact. On Cpap-no resp. distress noted @ present. R SC HD cath intact. R shoulder with Fentanyl patch in place. L Picc line infusing Zosyn @ present. Condom cath intact. Multiple dsg noted-all d/i. Colostomy noted L abd. WB et NSS to BLE..Safety measures on-going.    2325-All tubes/lines intact. Physical assessment complete. Still on Cpap. NAD noted @ present. Safety measuers on-going.       0400-Bathe. Linen changed. Oral, skin et ji care done.    0610-Quiet hrs. NAD noted @ present. All tubes/lines intact. Safety measures on-going.

## 2022-03-02 NOTE — DIALYSIS ROUNDING
"          Nephrology Department            NAME: Og Garcia   YOB: 1955  MRN: [id number]  NOTE DATE: 03/02/2022       Seen on dialysis in [facility].  His blood pressure is running low.      Patient complains:  None.      REVIEW OF SYSTEMS:  he reports no shortness of breath, nausea or vomiting. No acute changes.    VITALS:  height is 5' 11" (1.803 m) and weight is 79.6 kg (175 lb 7.8 oz). His temperature is 97.8 °F (36.6 °C). His blood pressure is 108/59 (abnormal) and his pulse is 103. His respiration is 24 (abnormal) and oxygen saturation is 100%.  Hemodialysis documentation flowsheet reviewed with dialysis nurse, including weight and vitals.    NAD.  Lungs clear.  Heart regular, no rub.  Abdomen soft, nontender, positive bowl sounds  No edema.    No results for input(s): NA, K, CL, CO2, BUN, CREATININE, GLUCOSE, CALCIUM, PHOS in the last 168 hours.    Invalid input(s): EGFR, LABALBU  Recent Labs   Lab 02/25/22  1025   WBC 23.45*   HGB 7.8*   HCT 23.2*   *       ASSESSMENT/PLAN:  Continue with scheduled hemodialysis for this patient.    Edwin Pryor Jr, MD  "

## 2022-03-02 NOTE — SUBJECTIVE & OBJECTIVE
Interval History: opens eyes, looks around room and moves upper ext. but a little less responsive today. Getting HD.    Objective:     Vital Signs (Most Recent):  Temp: 98.5 °F (36.9 °C) (03/02/22 1231)  Pulse: 105 (03/02/22 1230)  Resp: (!) 26 (03/02/22 1230)  BP: (!) 96/54 (03/02/22 1230)  SpO2: 100 % (03/02/22 1230)   Vital Signs (24h Range):  Temp:  [97.8 °F (36.6 °C)-100 °F (37.8 °C)] 98.5 °F (36.9 °C)  Pulse:  [] 105  Resp:  [10-36] 26  SpO2:  [100 %] 100 %  BP: ()/(42-65) 96/54     Weight: 79.6 kg (175 lb 7.8 oz)  Body mass index is 24.48 kg/m².      Intake/Output Summary (Last 24 hours) at 3/2/2022 1352  Last data filed at 3/2/2022 0737  Gross per 24 hour   Intake 1150 ml   Output 275 ml   Net 875 ml       Physical Exam  Vitals reviewed.   Constitutional:       General: He is not in acute distress.     Appearance: He is not ill-appearing.      Comments: Chronically ill appearing   HENT:      Head: Normocephalic and atraumatic.      Right Ear: External ear normal.      Left Ear: External ear normal.      Nose: Nose normal.      Mouth/Throat:      Mouth: Mucous membranes are moist.      Comments: trach  Eyes:      Conjunctiva/sclera: Conjunctivae normal.      Pupils: Pupils are equal, round, and reactive to light.   Cardiovascular:      Rate and Rhythm: Normal rate and regular rhythm.      Pulses: Normal pulses.      Heart sounds: Normal heart sounds.   Pulmonary:      Effort: No respiratory distress.      Comments: clear breath sounds anteriorly  Abdominal:      Palpations: Abdomen is soft.   Musculoskeletal:         General: Normal range of motion.      Cervical back: Neck supple.   Lymphadenopathy:      Cervical: No cervical adenopathy.   Skin:     General: Skin is warm and dry.      Coloration: Skin is not pale.   Psychiatric:         Behavior: Behavior normal.       Vents:  Vent Mode: PS/CPAP (03/02/22 1033)  Set Rate: 14 BPM (03/02/22 0500)  Vt Set: 480 mL (03/02/22 0500)  Pressure Support:  12 cmH20 (03/02/22 1033)  PEEP/CPAP: 5 cmH20 (03/02/22 1033)  Oxygen Concentration (%): 35 (03/02/22 1033)  Peak Airway Pressure: 20 cmH2O (03/02/22 0500)  Plateau Pressure: 11 cmH20 (02/28/22 0435)  Total Ve: 7.2 mL (03/02/22 1033)  F/VT Ratio<105 (RSBI): 110.67 (02/28/22 1615)    Lines/Drains/Airways       Peripherally Inserted Central Catheter Line  Duration             PICC Double Lumen 01/05/22 left basilic 56 days              Central Venous Catheter Line  Duration                  Hemodialysis Catheter 12/30/21 0900 right internal jugular 62 days              Drain  Duration                  NG/OG Tube [place] sump 18 Fr. Left nostril -- days         Colostomy 12/18/21 1030 Descending/sigmoid LUQ 74 days    Male External Urinary Catheter 02/21/22 Medium 9 days              Airway  Duration                  Surgical Airway 01/04/22 Shiley 57 days                    Significant Labs:    CBC/Anemia Profile:  No results for input(s): WBC, HGB, HCT, PLT, MCV, RDW, IRON, FERRITIN, RETIC, FOLATE, [id number], OCCULTBLOOD in the last 48 hours.     Chemistries:  No results for input(s): NA, K, CL, CO2, BUN, CREATININE, CALCIUM, ALBUMIN, PROT, BILITOT, ALKPHOS, ALT, AST, GLUCOSE, MG, PHOS in the last 48 hours.    All pertinent labs within the past 24 hours have been reviewed.    Significant Imaging:  I have reviewed all pertinent imaging results/findings within the past 24 hours.

## 2022-03-02 NOTE — ASSESSMENT & PLAN NOTE
- 12/14 intubated, tracheostomy 1/4 2/28  tolerating CPAP -- increase to 4 hours TID  3/1- Doing well on PSV/CPAP. Will plan for 4 hours tid again today and increase tomorrow   3/2- increase to 5 hours TID

## 2022-03-02 NOTE — PROGRESS NOTES
[facility]  Pulmonology  Progress Note    Patient Name: Og Garcia  MRN: [id number]  Admission Date: 12/23/2021  Hospital Length of Stay: 69 days  Code Status: Prior  Attending Provider: Cecil Abernathy DO  Primary Care Provider: Hector Arndt DNP, FNP-C   Principal Problem: Denice gangrene    Subjective:     Interval History: opens eyes, looks around room and moves upper ext. but a little less responsive today. Getting HD.    Objective:     Vital Signs (Most Recent):  Temp: 98.5 °F (36.9 °C) (03/02/22 1231)  Pulse: 105 (03/02/22 1230)  Resp: (!) 26 (03/02/22 1230)  BP: (!) 96/54 (03/02/22 1230)  SpO2: 100 % (03/02/22 1230)   Vital Signs (24h Range):  Temp:  [97.8 °F (36.6 °C)-100 °F (37.8 °C)] 98.5 °F (36.9 °C)  Pulse:  [] 105  Resp:  [10-36] 26  SpO2:  [100 %] 100 %  BP: ()/(42-65) 96/54     Weight: 79.6 kg (175 lb 7.8 oz)  Body mass index is 24.48 kg/m².      Intake/Output Summary (Last 24 hours) at 3/2/2022 1352  Last data filed at 3/2/2022 0737  Gross per 24 hour   Intake 1150 ml   Output 275 ml   Net 875 ml       Physical Exam  Vitals reviewed.   Constitutional:       General: He is not in acute distress.     Appearance: He is not ill-appearing.      Comments: Chronically ill appearing   HENT:      Head: Normocephalic and atraumatic.      Right Ear: External ear normal.      Left Ear: External ear normal.      Nose: Nose normal.      Mouth/Throat:      Mouth: Mucous membranes are moist.      Comments: trach  Eyes:      Conjunctiva/sclera: Conjunctivae normal.      Pupils: Pupils are equal, round, and reactive to light.   Cardiovascular:      Rate and Rhythm: Normal rate and regular rhythm.      Pulses: Normal pulses.      Heart sounds: Normal heart sounds.   Pulmonary:      Effort: No respiratory distress.      Comments: clear breath sounds anteriorly  Abdominal:      Palpations: Abdomen is soft.   Musculoskeletal:         General: Normal range of motion.       Cervical back: Neck supple.   Lymphadenopathy:      Cervical: No cervical adenopathy.   Skin:     General: Skin is warm and dry.      Coloration: Skin is not pale.   Psychiatric:         Behavior: Behavior normal.       Vents:  Vent Mode: PS/CPAP (03/02/22 1033)  Set Rate: 14 BPM (03/02/22 0500)  Vt Set: 480 mL (03/02/22 0500)  Pressure Support: 12 cmH20 (03/02/22 1033)  PEEP/CPAP: 5 cmH20 (03/02/22 1033)  Oxygen Concentration (%): 35 (03/02/22 1033)  Peak Airway Pressure: 20 cmH2O (03/02/22 0500)  Plateau Pressure: 11 cmH20 (02/28/22 0435)  Total Ve: 7.2 mL (03/02/22 1033)  F/VT Ratio<105 (RSBI): 110.67 (02/28/22 1615)    Lines/Drains/Airways       Peripherally Inserted Central Catheter Line  Duration             PICC Double Lumen 01/05/22 left basilic 56 days              Central Venous Catheter Line  Duration                  Hemodialysis Catheter 12/30/21 0900 right internal jugular 62 days              Drain  Duration                  NG/OG Tube [place] sump 18 Fr. Left nostril -- days         Colostomy 12/18/21 1030 Descending/sigmoid LUQ 74 days    Male External Urinary Catheter 02/21/22 Medium 9 days              Airway  Duration                  Surgical Airway 01/04/22 Shiley 57 days                    Significant Labs:    CBC/Anemia Profile:  No results for input(s): WBC, HGB, HCT, PLT, MCV, RDW, IRON, FERRITIN, RETIC, FOLATE, [id number], OCCULTBLOOD in the last 48 hours.     Chemistries:  No results for input(s): NA, K, CL, CO2, BUN, CREATININE, CALCIUM, ALBUMIN, PROT, BILITOT, ALKPHOS, ALT, AST, GLUCOSE, MG, PHOS in the last 48 hours.    All pertinent labs within the past 24 hours have been reviewed.    Significant Imaging:  I have reviewed all pertinent imaging results/findings within the past 24 hours.    Assessment/Plan:     * Denice gangrene  - s/p multiple debridements  - wound vac in place  - ID consulted for antibiotic management: He was treated with rocephin, daptomycin, clindamycin. 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 Cazares  2/22- back to OR yesterday for skin grafting    Fever  2/26 -Tmax 101.4 -- resp culture - with GNB - will start Zosyn today - follow cultures   BC x 2 NGTD   2/28 - resp culture with pseudomonas and klebsiella  treat with zosyn for 7 days     ESRD (end stage renal disease)  Stated on HD 12/30   Continue with HD per nephrology  TTS     Type 2 diabetes mellitus without complication, without long-term current use of insulin  - continue ISS  - glucose stable/controlled  -     Acute blood loss anemia  Has required multiple transfusions this admission  2/25 - 7/23  Most recent Hgb is 7.8    On mechanically assisted ventilation  - 12/14 intubated, tracheostomy 1/4 2/28  tolerating CPAP -- increase to 4 hours TID  3/1- Doing well on PSV/CPAP. Will plan for 4 hours tid again today and increase tomorrow   3/2- increase to 5 hours TID     Hypertension  - continue diltiazem  - 3/02- bp is on lower side today while on HD                  FLORESITA Shafer-ACNP  Pulmonology  [facility]

## 2022-03-03 NOTE — PROGRESS NOTES
[facility]  Nephrology  Progress Note    Patient Name: Og Garcia  MRN: [id number]  Admission Date: 12/23/2021  Hospital Length of Stay: 70 days  Attending Provider: Cecil Abernathy DO   Primary Care Physician: Hector Arndt DNP, FNP-C  Principal Problem:Denice gangrene    Subjective:     HPI: The patient is known from the previous nephrology consult at [facility].  He is no at Specialty and continues to need dialysis support.      Interval History: The patient is resting.  No acute changes.      Review of patient's allergies indicates:  No Known Allergies  Current Facility-Administered Medications   Medication Frequency    0.9%  NaCl infusion (for blood administration) Q24H PRN    0.9%  NaCl infusion PRN    acetaminophen tablet 500 mg Q6H PRN    albuterol nebulizer solution 2.5 mg Q4H PRN    bisacodyL EC tablet 10 mg Daily PRN    calcium gluconate 1 g in dextrose 5 % in water (D5W) 5 % 100 mL IVPB (MB+) Once    dextromethorphan-guaiFENesin  mg/5 ml liquid 10 mL Q6H PRN    dextrose 50% injection 12.5 g PRN    diltiaZEM tablet 60 mg Q6H    fentaNYL 50 mcg/hr 1 patch Q72H    glucagon (human recombinant) injection 1 mg PRN    guaiFENesin 100 mg/5 ml syrup 200 mg Q6H    heparin (porcine) injection 4,000 Units PRN    insulin aspart U-100 injection 1-10 Units Q6H    lorazepam injection 2 mg Q6H PRN    ondansetron injection 8 mg Q6H PRN    pantoprazole suspension 40 mg Daily    piperacillin-tazobactam (ZOSYN) 4.5 g in dextrose 5 % in water (D5W) 5 % 100 mL IVPB (MB+) Q12H    sevelamer carbonate pwpk 0.8 g TID WM    silver sulfADIAZINE 1% cream PRN    simethicone chewable tablet 80 mg TID PRN    ticagrelor tablet 90 mg BID       Objective:     Vital Signs (Most Recent):  Temp: 98.9 °F (37.2 °C) (03/03/22 1215)  Pulse: 101 (03/03/22 1200)  Resp: (!) 27 (03/03/22 1200)  BP: 130/63 (03/03/22 1200)  SpO2: 100 % (03/03/22 1200)  O2 Device (Oxygen Therapy): ventilator  (03/03/22 0615)   Vital Signs (24h Range):  Temp:  [98.5 °F (36.9 °C)-98.9 °F (37.2 °C)] 98.9 °F (37.2 °C)  Pulse:  [] 101  Resp:  [13-35] 27  SpO2:  [90 %-100 %] 100 %  BP: ()/(49-67) 130/63     Weight: 79.6 kg (175 lb 7.8 oz) (03/02/22 0600)  Body mass index is 24.48 kg/m².  Body surface area is 2 meters squared.    I/O last 3 completed shifts:  In: 2104.6 [NG/GT:1800; IV Piggyback:304.6]  Out: 395 [Stool:395]    Physical Exam  Vitals reviewed.   HENT:      Head: Atraumatic.   Cardiovascular:      Rate and Rhythm: Regular rhythm.      Heart sounds: Normal heart sounds.   Pulmonary:      Breath sounds: Normal breath sounds.   Abdominal:      Palpations: Abdomen is soft.   Neurological:      Mental Status: He is alert.       Significant Labs:  BMP: No results for input(s): GLU, NA, K, CL, CO2, BUN, CREATININE, CALCIUM, MG in the last 168 hours.     Significant Imaging:  Labs: Reviewed    Assessment/Plan:     * Denice gangrene  Continue wound care  2/3/2022 Continue with wound care  2/25/2022 Continue with wound care.  3/3/2022 Continue with wound care.    ESRD (end stage renal disease)  Continue with scheduled hemodialysis for this patient.  2/24/2021  The patient is doing well with dialysis.  Continue with scheduled hemodialysis for this patient.  2/25/2022.  The patient tolerates scheduled hemodialysis.  2/26/2022 Continuing with dialysis as scheduled  2/27/2022.  Dialysis as scheduled.  3/1/2022  At this time, continue with dialysis management.  3/3/2022.  Continue with scheduled dialysis.        Thank you for your consult. I will follow-up with patient. Please contact us if you have any additional questions.    Edwin Pryor Jr, MD  Nephrology  [facility]

## 2022-03-03 NOTE — ASSESSMENT & PLAN NOTE
Continue with scheduled hemodialysis for this patient.  2/24/2021  The patient is doing well with dialysis.  Continue with scheduled hemodialysis for this patient.  2/25/2022.  The patient tolerates scheduled hemodialysis.  2/26/2022 Continuing with dialysis as scheduled  2/27/2022.  Dialysis as scheduled.  3/1/2022  At this time, continue with dialysis management.  3/3/2022.  Continue with scheduled dialysis.

## 2022-03-03 NOTE — PLAN OF CARE
Problem: Communication Impairment (Mechanical Ventilation, Invasive)  Goal: Effective Communication  Outcome: Ongoing, Progressing     Problem: Ventilator-Induced Lung Injury (Mechanical Ventilation, Invasive)  Goal: Absence of Ventilator-Induced Lung Injury  Outcome: Ongoing, Progressing

## 2022-03-03 NOTE — PROGRESS NOTES
[facility]  Pulmonology  Progress Note    Patient Name: Og Garcia  MRN: [id number]  Admission Date: 12/23/2021  Hospital Length of Stay: 70 days  Code Status: Prior  Attending Provider: Cecil Abernathy DO  Primary Care Provider: Hector Arndt DNP, FNP-C   Principal Problem: Denice gangrene    Subjective:     Interval History: more awake today, denies any complaints     Objective:     Vital Signs (Most Recent):  Temp: 98.7 °F (37.1 °C) (03/03/22 0800)  Pulse: 92 (03/03/22 0800)  Resp: (!) 22 (03/03/22 0800)  BP: (!) 121/59 (03/03/22 0800)  SpO2: (!) 91 % (03/03/22 0800)   Vital Signs (24h Range):  Temp:  [98.1 °F (36.7 °C)-98.9 °F (37.2 °C)] 98.7 °F (37.1 °C)  Pulse:  [] 92  Resp:  [14-35] 22  SpO2:  [90 %-100 %] 91 %  BP: ()/(49-67) 121/59     Weight: 79.6 kg (175 lb 7.8 oz)  Body mass index is 24.48 kg/m².      Intake/Output Summary (Last 24 hours) at 3/3/2022 1208  Last data filed at 3/3/2022 0615  Gross per 24 hour   Intake 954.58 ml   Output 120 ml   Net 834.58 ml       Physical Exam  Vitals reviewed.   Constitutional:       General: He is not in acute distress.     Appearance: He is not ill-appearing.      Comments: Chronically ill appearing   HENT:      Head: Normocephalic and atraumatic.      Right Ear: External ear normal.      Left Ear: External ear normal.      Nose: Nose normal.      Mouth/Throat:      Mouth: Mucous membranes are moist.      Comments: trach  Eyes:      Conjunctiva/sclera: Conjunctivae normal.      Pupils: Pupils are equal, round, and reactive to light.   Cardiovascular:      Rate and Rhythm: Normal rate and regular rhythm.      Pulses: Normal pulses.      Heart sounds: Normal heart sounds.   Pulmonary:      Effort: No respiratory distress.      Comments: clear breath sounds anteriorly  Abdominal:      Palpations: Abdomen is soft.   Musculoskeletal:         General: Normal range of motion.      Cervical back: Neck supple.    Lymphadenopathy:      Cervical: No cervical adenopathy.   Skin:     General: Skin is warm and dry.      Coloration: Skin is not pale.   Psychiatric:         Behavior: Behavior normal.       Vents:  Vent Mode: A/C (03/03/22 1132)  Set Rate: 14 BPM (03/03/22 1132)  Vt Set: 480 mL (03/03/22 1132)  Pressure Support: 12 cmH20 (03/03/22 0808)  PEEP/CPAP: 5 cmH20 (03/03/22 1132)  Oxygen Concentration (%): 35 (03/03/22 1132)  Peak Airway Pressure: 18 cmH2O (03/03/22 0337)  Plateau Pressure: 11 cmH20 (02/28/22 0435)  Total Ve: 7.9 mL (03/03/22 1132)  F/VT Ratio<105 (RSBI): (!) 52.63 (03/02/22 1500)    Lines/Drains/Airways       Peripherally Inserted Central Catheter Line  Duration             PICC Double Lumen 01/05/22 left basilic 57 days              Central Venous Catheter Line  Duration                  Hemodialysis Catheter 12/30/21 0900 right internal jugular 63 days              Drain  Duration                  NG/OG Tube [place] sump 18 Fr. Left nostril -- days         Colostomy 12/18/21 1030 Descending/sigmoid LUQ 75 days    Male External Urinary Catheter 02/21/22 Medium 10 days              Airway  Duration                  Surgical Airway 01/04/22 Shiley 58 days                    Significant Labs:    CBC/Anemia Profile:  No results for input(s): WBC, HGB, HCT, PLT, MCV, RDW, IRON, FERRITIN, RETIC, FOLATE, [id number], OCCULTBLOOD in the last 48 hours.     Chemistries:  No results for input(s): NA, K, CL, CO2, BUN, CREATININE, CALCIUM, ALBUMIN, PROT, BILITOT, ALKPHOS, ALT, AST, GLUCOSE, MG, PHOS in the last 48 hours.    All pertinent labs within the past 24 hours have been reviewed.    Significant Imaging:  I have reviewed all pertinent imaging results/findings within the past 24 hours.    Assessment/Plan:     * Denice gangrene  - s/p multiple debridements  - wound vac in place  - ID consulted for antibiotic management: He was treated with rocephin, daptomycin, clindamycin. 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 Cazares  2/22- back to OR yesterday for skin grafting  3/2- ask surgery to eval for possible PEG tube placement     Fever  2/26 -Tmax 101.4 -- resp culture - with GNB - will start Zosyn today - follow cultures   BC x 2 NGTD   2/28 - resp culture with pseudomonas and klebsiella  treat with zosyn for 7 days     ESRD (end stage renal disease)  Stated on HD 12/30   Continue with HD per nephrology  TTS     Type 2 diabetes mellitus without complication, without long-term current use of insulin  - continue ISS  - glucose stable/controlled  -     Acute blood loss anemia  Has required multiple transfusions this admission    Most recent Hgb is 7.8 - CBC in AM     Necrotizing fasciitis  S/p multiple surgeries     On mechanically assisted ventilation  - 12/14 intubated, tracheostomy 1/4    3/3- increase to 8 hours BID                  FLORESITA Shafer-ACNP  Pulmonology  [facility]

## 2022-03-03 NOTE — SUBJECTIVE & OBJECTIVE
Interval History: more awake today, denies any complaints     Objective:     Vital Signs (Most Recent):  Temp: 98.7 °F (37.1 °C) (03/03/22 0800)  Pulse: 92 (03/03/22 0800)  Resp: (!) 22 (03/03/22 0800)  BP: (!) 121/59 (03/03/22 0800)  SpO2: (!) 91 % (03/03/22 0800)   Vital Signs (24h Range):  Temp:  [98.1 °F (36.7 °C)-98.9 °F (37.2 °C)] 98.7 °F (37.1 °C)  Pulse:  [] 92  Resp:  [14-35] 22  SpO2:  [90 %-100 %] 91 %  BP: ()/(49-67) 121/59     Weight: 79.6 kg (175 lb 7.8 oz)  Body mass index is 24.48 kg/m².      Intake/Output Summary (Last 24 hours) at 3/3/2022 1208  Last data filed at 3/3/2022 0615  Gross per 24 hour   Intake 954.58 ml   Output 120 ml   Net 834.58 ml       Physical Exam  Vitals reviewed.   Constitutional:       General: He is not in acute distress.     Appearance: He is not ill-appearing.      Comments: Chronically ill appearing   HENT:      Head: Normocephalic and atraumatic.      Right Ear: External ear normal.      Left Ear: External ear normal.      Nose: Nose normal.      Mouth/Throat:      Mouth: Mucous membranes are moist.      Comments: trach  Eyes:      Conjunctiva/sclera: Conjunctivae normal.      Pupils: Pupils are equal, round, and reactive to light.   Cardiovascular:      Rate and Rhythm: Normal rate and regular rhythm.      Pulses: Normal pulses.      Heart sounds: Normal heart sounds.   Pulmonary:      Effort: No respiratory distress.      Comments: clear breath sounds anteriorly  Abdominal:      Palpations: Abdomen is soft.   Musculoskeletal:         General: Normal range of motion.      Cervical back: Neck supple.   Lymphadenopathy:      Cervical: No cervical adenopathy.   Skin:     General: Skin is warm and dry.      Coloration: Skin is not pale.   Psychiatric:         Behavior: Behavior normal.       Vents:  Vent Mode: A/C (03/03/22 1132)  Set Rate: 14 BPM (03/03/22 1132)  Vt Set: 480 mL (03/03/22 1132)  Pressure Support: 12 cmH20 (03/03/22 0808)  PEEP/CPAP: 5 cmH20  (03/03/22 1132)  Oxygen Concentration (%): 35 (03/03/22 1132)  Peak Airway Pressure: 18 cmH2O (03/03/22 0337)  Plateau Pressure: 11 cmH20 (02/28/22 0435)  Total Ve: 7.9 mL (03/03/22 1132)  F/VT Ratio<105 (RSBI): (!) 52.63 (03/02/22 1500)    Lines/Drains/Airways       Peripherally Inserted Central Catheter Line  Duration             PICC Double Lumen 01/05/22 left basilic 57 days              Central Venous Catheter Line  Duration                  Hemodialysis Catheter 12/30/21 0900 right internal jugular 63 days              Drain  Duration                  NG/OG Tube [place] sump 18 Fr. Left nostril -- days         Colostomy 12/18/21 1030 Descending/sigmoid LUQ 75 days    Male External Urinary Catheter 02/21/22 Medium 10 days              Airway  Duration                  Surgical Airway 01/04/22 Shiley 58 days                    Significant Labs:    CBC/Anemia Profile:  No results for input(s): WBC, HGB, HCT, PLT, MCV, RDW, IRON, FERRITIN, RETIC, FOLATE, [id number], OCCULTBLOOD in the last 48 hours.     Chemistries:  No results for input(s): NA, K, CL, CO2, BUN, CREATININE, CALCIUM, ALBUMIN, PROT, BILITOT, ALKPHOS, ALT, AST, GLUCOSE, MG, PHOS in the last 48 hours.    All pertinent labs within the past 24 hours have been reviewed.    Significant Imaging:  I have reviewed all pertinent imaging results/findings within the past 24 hours.

## 2022-03-03 NOTE — ASSESSMENT & PLAN NOTE
- s/p multiple debridements  - wound vac in place  - ID consulted for antibiotic management: He was treated with rocephin, daptomycin, clindamycin. 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 Cazares  2/22- back to OR yesterday for skin grafting  3/2- ask surgery to eval for possible PEG tube placement

## 2022-03-03 NOTE — SUBJECTIVE & OBJECTIVE
Interval History: The patient is resting.  No acute changes.      Review of patient's allergies indicates:  No Known Allergies  Current Facility-Administered Medications   Medication Frequency    0.9%  NaCl infusion (for blood administration) Q24H PRN    0.9%  NaCl infusion PRN    acetaminophen tablet 500 mg Q6H PRN    albuterol nebulizer solution 2.5 mg Q4H PRN    bisacodyL EC tablet 10 mg Daily PRN    calcium gluconate 1 g in dextrose 5 % in water (D5W) 5 % 100 mL IVPB (MB+) Once    dextromethorphan-guaiFENesin  mg/5 ml liquid 10 mL Q6H PRN    dextrose 50% injection 12.5 g PRN    diltiaZEM tablet 60 mg Q6H    fentaNYL 50 mcg/hr 1 patch Q72H    glucagon (human recombinant) injection 1 mg PRN    guaiFENesin 100 mg/5 ml syrup 200 mg Q6H    heparin (porcine) injection 4,000 Units PRN    insulin aspart U-100 injection 1-10 Units Q6H    lorazepam injection 2 mg Q6H PRN    ondansetron injection 8 mg Q6H PRN    pantoprazole suspension 40 mg Daily    piperacillin-tazobactam (ZOSYN) 4.5 g in dextrose 5 % in water (D5W) 5 % 100 mL IVPB (MB+) Q12H    sevelamer carbonate pwpk 0.8 g TID WM    silver sulfADIAZINE 1% cream PRN    simethicone chewable tablet 80 mg TID PRN    ticagrelor tablet 90 mg BID       Objective:     Vital Signs (Most Recent):  Temp: 98.9 °F (37.2 °C) (03/03/22 1215)  Pulse: 101 (03/03/22 1200)  Resp: (!) 27 (03/03/22 1200)  BP: 130/63 (03/03/22 1200)  SpO2: 100 % (03/03/22 1200)  O2 Device (Oxygen Therapy): ventilator (03/03/22 0615)   Vital Signs (24h Range):  Temp:  [98.5 °F (36.9 °C)-98.9 °F (37.2 °C)] 98.9 °F (37.2 °C)  Pulse:  [] 101  Resp:  [13-35] 27  SpO2:  [90 %-100 %] 100 %  BP: ()/(49-67) 130/63     Weight: 79.6 kg (175 lb 7.8 oz) (03/02/22 0600)  Body mass index is 24.48 kg/m².  Body surface area is 2 meters squared.    I/O last 3 completed shifts:  In: 2104.6 [NG/GT:1800; IV Piggyback:304.6]  Out: 395 [Stool:395]    Physical Exam  Vitals reviewed.   HENT:      Head:  Atraumatic.   Cardiovascular:      Rate and Rhythm: Regular rhythm.      Heart sounds: Normal heart sounds.   Pulmonary:      Breath sounds: Normal breath sounds.   Abdominal:      Palpations: Abdomen is soft.   Neurological:      Mental Status: He is alert.       Significant Labs:  BMP: No results for input(s): GLU, NA, K, CL, CO2, BUN, CREATININE, CALCIUM, MG in the last 168 hours.     Significant Imaging:  Labs: Reviewed

## 2022-03-03 NOTE — ASSESSMENT & PLAN NOTE
Continue wound care  2/3/2022 Continue with wound care  2/25/2022 Continue with wound care.  3/3/2022 Continue with wound care.

## 2022-03-04 NOTE — ASSESSMENT & PLAN NOTE
Continue with scheduled hemodialysis for this patient.  2/24/2021  The patient is doing well with dialysis.  Continue with scheduled hemodialysis for this patient.  2/25/2022.  The patient tolerates scheduled hemodialysis.  2/26/2022 Continuing with dialysis as scheduled  2/27/2022.  Dialysis as scheduled.  3/1/2022  At this time, continue with dialysis management.  3/3/2022.  Continue with scheduled dialysis.  3/4/2022  Continue with dialysis.

## 2022-03-04 NOTE — PLAN OF CARE
Problem: Impaired Wound Healing  Goal: Optimal Wound Healing  Outcome: Ongoing, Progressing     Problem: Gas Exchange Impaired  Goal: Optimal Gas Exchange  Outcome: Ongoing, Progressing

## 2022-03-04 NOTE — ASSESSMENT & PLAN NOTE
- s/p multiple debridements  - wound vac in place  - ID consulted for antibiotic management: He was treated with rocephin, daptomycin, clindamycin. 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 Cazares  2/22- back to OR yesterday for skin grafting  3/2- ask surgery to eval for possible PEG tube placement   3/4- plan for possible surgery this next week

## 2022-03-04 NOTE — SUBJECTIVE & OBJECTIVE
Interval History: No acute events overnight. Currently hemodynamically stable. Oxygenating adequately on the ventilator. Currently afebrile. Doing well with breathing trials. Plan for HD today.    Objective:     Vital Signs (Most Recent):  Temp: 98.6 °F (37 °C) (03/03/22 2100)  Pulse: 110 (03/04/22 0945)  Resp: 19 (03/04/22 0945)  BP: 125/75 (03/04/22 0945)  SpO2: 100 % (03/04/22 0945) Vital Signs (24h Range):  Temp:  [98.6 °F (37 °C)-98.9 °F (37.2 °C)] 98.6 °F (37 °C)  Pulse:  [] 110  Resp:  [13-38] 19  SpO2:  [96 %-100 %] 100 %  BP: (110-144)/(60-80) 125/75     Weight: 79.6 kg (175 lb 7.8 oz)  Body mass index is 24.48 kg/m².      Intake/Output Summary (Last 24 hours) at 3/4/2022 0954  Last data filed at 3/4/2022 0700  Gross per 24 hour   Intake --   Output 300 ml   Net -300 ml         Physical Exam  Vitals reviewed.   Constitutional:       General: He is not in acute distress.     Appearance: He is not ill-appearing.      Comments: Chronically ill appearing   HENT:      Head: Normocephalic and atraumatic.      Right Ear: External ear normal.      Left Ear: External ear normal.      Nose: Nose normal.      Mouth/Throat:      Mouth: Mucous membranes are moist.      Comments: trach  Eyes:      Conjunctiva/sclera: Conjunctivae normal.      Pupils: Pupils are equal, round, and reactive to light.   Cardiovascular:      Rate and Rhythm: Regular rhythm. Tachycardia present.      Pulses: Normal pulses.      Heart sounds: Normal heart sounds.   Pulmonary:      Effort: No respiratory distress.      Comments: clear breath sounds anteriorly  Abdominal:      Palpations: Abdomen is soft.   Musculoskeletal:         General: Normal range of motion.      Cervical back: Neck supple.   Lymphadenopathy:      Cervical: No cervical adenopathy.   Skin:     General: Skin is warm and dry.      Coloration: Skin is not pale.   Neurological:      Mental Status: Mental status is at baseline.      Comments: Moves spontaneously, tracks  with good eye   Psychiatric:         Behavior: Behavior normal.       Vents:  Vent Mode: A/C (03/04/22 0410)  Set Rate: 14 BPM (03/04/22 0410)  Vt Set: 480 mL (03/04/22 0410)  Pressure Support: 12 cmH20 (03/03/22 0808)  PEEP/CPAP: 5 cmH20 (03/04/22 0410)  Oxygen Concentration (%): 35 (03/04/22 0410)  Peak Airway Pressure: 19 cmH2O (03/04/22 0410)  Plateau Pressure: 11 cmH20 (02/28/22 0435)  Total Ve: 7.8 mL (03/04/22 0410)  F/VT Ratio<105 (RSBI): (!) 52.63 (03/02/22 1500)    Lines/Drains/Airways       Peripherally Inserted Central Catheter Line  Duration             PICC Double Lumen 01/05/22 left basilic 58 days              Central Venous Catheter Line  Duration                  Hemodialysis Catheter 12/30/21 0900 right internal jugular 64 days              Drain  Duration                  NG/OG Tube [place] sump 18 Fr. Left nostril -- days         Colostomy 12/18/21 1030 Descending/sigmoid LUQ 75 days    Male External Urinary Catheter 02/21/22 Medium 11 days              Airway  Duration                  Surgical Airway 01/04/22 Shiley 59 days                    Significant Labs:    CBC/Anemia Profile:  No results for input(s): WBC, HGB, HCT, PLT, MCV, RDW, IRON, FERRITIN, RETIC, FOLATE, [id number], OCCULTBLOOD in the last 48 hours.       Chemistries:  No results for input(s): NA, K, CL, CO2, BUN, CREATININE, CALCIUM, ALBUMIN, PROT, BILITOT, ALKPHOS, ALT, AST, GLUCOSE, MG, PHOS in the last 48 hours.      All pertinent labs within the past 24 hours have been reviewed.    Significant Imaging:  I have reviewed all pertinent imaging results/findings within the past 24 hours.

## 2022-03-04 NOTE — PROGRESS NOTES
[facility]  Nephrology  Progress Note    Patient Name: Og Garcia  MRN: [id number]  Admission Date: 12/23/2021  Hospital Length of Stay: 71 days  Attending Provider: Cecil Aberntahy DO   Primary Care Physician: Hector Arndt DNP, FNP-C  Principal Problem:Denice gangrene    Subjective:     HPI: The patient is known from the previous nephrology consult at [facility].  He is no at Specialty and continues to need dialysis support.      Interval History: The patient is resting.  Continue with CPAP trials.    Review of patient's allergies indicates:  No Known Allergies  Current Facility-Administered Medications   Medication Frequency    0.9%  NaCl infusion (for blood administration) Q24H PRN    0.9%  NaCl infusion PRN    acetaminophen tablet 500 mg Q6H PRN    albuterol nebulizer solution 2.5 mg Q4H PRN    bisacodyL EC tablet 10 mg Daily PRN    calcium gluconate 1 g in dextrose 5 % in water (D5W) 5 % 100 mL IVPB (MB+) Once    dextromethorphan-guaiFENesin  mg/5 ml liquid 10 mL Q6H PRN    dextrose 50% injection 12.5 g PRN    diltiaZEM tablet 60 mg Q6H    fentaNYL 50 mcg/hr 1 patch Q72H    glucagon (human recombinant) injection 1 mg PRN    guaiFENesin 100 mg/5 ml syrup 200 mg Q6H    heparin (porcine) injection 4,000 Units PRN    insulin aspart U-100 injection 1-10 Units Q6H    lorazepam injection 2 mg Q6H PRN    ondansetron injection 8 mg Q6H PRN    pantoprazole suspension 40 mg Daily    piperacillin-tazobactam (ZOSYN) 4.5 g in dextrose 5 % in water (D5W) 5 % 100 mL IVPB (MB+) Q12H    sevelamer carbonate pwpk 0.8 g TID WM    silver sulfADIAZINE 1% cream PRN    simethicone chewable tablet 80 mg TID PRN       Objective:     Vital Signs (Most Recent):  Temp: 99.7 °F (37.6 °C) (03/04/22 1124)  Pulse: (!) 122 (03/04/22 1315)  Resp: (!) 26 (03/04/22 1315)  BP: 127/76 (03/04/22 1315)  SpO2: 98 % (03/04/22 1315)  O2 Device (Oxygen Therapy): ventilator (03/04/22 1214)   Vital  Signs (24h Range):  Temp:  [98.6 °F (37 °C)-99.7 °F (37.6 °C)] 99.7 °F (37.6 °C)  Pulse:  [] 122  Resp:  [14-38] 26  SpO2:  [96 %-100 %] 98 %  BP: ()/(55-80) 127/76     Weight: 79.6 kg (175 lb 7.8 oz) (03/02/22 0600)  Body mass index is 24.48 kg/m².  Body surface area is 2 meters squared.    I/O last 3 completed shifts:  In: 104.6 [IV Piggyback:104.6]  Out: 420 [Stool:420]    Physical Exam  Vitals reviewed.   HENT:      Head: Atraumatic.   Cardiovascular:      Rate and Rhythm: Regular rhythm.   Pulmonary:      Effort: Pulmonary effort is normal.      Comments:  ventilated  Abdominal:      Palpations: Abdomen is soft.       Significant Labs:  BMP: No results for input(s): GLU, NA, K, CL, CO2, BUN, CREATININE, CALCIUM, MG in the last 168 hours.  CBC: No results for input(s): WBC, RBC, HGB, HCT, PLT, MCV, MCH, MCHC in the last 168 hours.     Significant Imaging:  Labs: Reviewed    Assessment/Plan:     ESRD (end stage renal disease)  Continue with scheduled hemodialysis for this patient.  2/24/2021  The patient is doing well with dialysis.  Continue with scheduled hemodialysis for this patient.  2/25/2022.  The patient tolerates scheduled hemodialysis.  2/26/2022 Continuing with dialysis as scheduled  2/27/2022.  Dialysis as scheduled.  3/1/2022  At this time, continue with dialysis management.  3/3/2022.  Continue with scheduled dialysis.  3/4/2022  Continue with dialysis.        Thank you for your consult. I will follow-up with patient. Please contact us if you have any additional questions.    Edwin Pryor Jr, MD  Nephrology  [facility]

## 2022-03-04 NOTE — SUBJECTIVE & OBJECTIVE
Interval History: The patient is resting.  Continue with CPAP trials.    Review of patient's allergies indicates:  No Known Allergies  Current Facility-Administered Medications   Medication Frequency    0.9%  NaCl infusion (for blood administration) Q24H PRN    0.9%  NaCl infusion PRN    acetaminophen tablet 500 mg Q6H PRN    albuterol nebulizer solution 2.5 mg Q4H PRN    bisacodyL EC tablet 10 mg Daily PRN    calcium gluconate 1 g in dextrose 5 % in water (D5W) 5 % 100 mL IVPB (MB+) Once    dextromethorphan-guaiFENesin  mg/5 ml liquid 10 mL Q6H PRN    dextrose 50% injection 12.5 g PRN    diltiaZEM tablet 60 mg Q6H    fentaNYL 50 mcg/hr 1 patch Q72H    glucagon (human recombinant) injection 1 mg PRN    guaiFENesin 100 mg/5 ml syrup 200 mg Q6H    heparin (porcine) injection 4,000 Units PRN    insulin aspart U-100 injection 1-10 Units Q6H    lorazepam injection 2 mg Q6H PRN    ondansetron injection 8 mg Q6H PRN    pantoprazole suspension 40 mg Daily    piperacillin-tazobactam (ZOSYN) 4.5 g in dextrose 5 % in water (D5W) 5 % 100 mL IVPB (MB+) Q12H    sevelamer carbonate pwpk 0.8 g TID WM    silver sulfADIAZINE 1% cream PRN    simethicone chewable tablet 80 mg TID PRN       Objective:     Vital Signs (Most Recent):  Temp: 99.7 °F (37.6 °C) (03/04/22 1124)  Pulse: (!) 122 (03/04/22 1315)  Resp: (!) 26 (03/04/22 1315)  BP: 127/76 (03/04/22 1315)  SpO2: 98 % (03/04/22 1315)  O2 Device (Oxygen Therapy): ventilator (03/04/22 0410)   Vital Signs (24h Range):  Temp:  [98.6 °F (37 °C)-99.7 °F (37.6 °C)] 99.7 °F (37.6 °C)  Pulse:  [] 122  Resp:  [14-38] 26  SpO2:  [96 %-100 %] 98 %  BP: ()/(55-80) 127/76     Weight: 79.6 kg (175 lb 7.8 oz) (03/02/22 0600)  Body mass index is 24.48 kg/m².  Body surface area is 2 meters squared.    I/O last 3 completed shifts:  In: 104.6 [IV Piggyback:104.6]  Out: 420 [Stool:420]    Physical Exam  Vitals reviewed.   HENT:      Head: Atraumatic.   Cardiovascular:      Rate and  Rhythm: Regular rhythm.   Pulmonary:      Effort: Pulmonary effort is normal.      Comments:  ventilated  Abdominal:      Palpations: Abdomen is soft.       Significant Labs:  BMP: No results for input(s): GLU, NA, K, CL, CO2, BUN, CREATININE, CALCIUM, MG in the last 168 hours.  CBC: No results for input(s): WBC, RBC, HGB, HCT, PLT, MCV, MCH, MCHC in the last 168 hours.     Significant Imaging:  Labs: Reviewed

## 2022-03-04 NOTE — ASSESSMENT & PLAN NOTE
2/26 -Tmax 101.4 -- resp culture - with GNB - will start Zosyn today - follow cultures   BC x 2 NGTD   2/28 - resp culture with pseudomonas and klebsiella  treat with zosyn for 7 days   Blood culture from 2/25 also growing Pseudomonas. Sensitive to zosyn. Will need to continue zosyn for at least 10 days total

## 2022-03-04 NOTE — PROGRESS NOTES
[facility]  Pulmonology  Progress Note    Patient Name: Og Garcia  MRN: [id number]  Admission Date: 12/23/2021  Hospital Length of Stay: 71 days  Code Status: Prior  Attending Provider: Cecil Abernathy DO  Primary Care Provider: Hector Arndt DNP, FNP-C   Principal Problem: Denice gangrene    Subjective:     Interval History: No acute events overnight. Currently hemodynamically stable. Oxygenating adequately on the ventilator. Currently afebrile. Doing well with breathing trials. Plan for HD today.    Objective:     Vital Signs (Most Recent):  Temp: 98.6 °F (37 °C) (03/03/22 2100)  Pulse: 110 (03/04/22 0945)  Resp: 19 (03/04/22 0945)  BP: 125/75 (03/04/22 0945)  SpO2: 100 % (03/04/22 0945) Vital Signs (24h Range):  Temp:  [98.6 °F (37 °C)-98.9 °F (37.2 °C)] 98.6 °F (37 °C)  Pulse:  [] 110  Resp:  [13-38] 19  SpO2:  [96 %-100 %] 100 %  BP: (110-144)/(60-80) 125/75     Weight: 79.6 kg (175 lb 7.8 oz)  Body mass index is 24.48 kg/m².      Intake/Output Summary (Last 24 hours) at 3/4/2022 0954  Last data filed at 3/4/2022 0700  Gross per 24 hour   Intake --   Output 300 ml   Net -300 ml         Physical Exam  Vitals reviewed.   Constitutional:       General: He is not in acute distress.     Appearance: He is not ill-appearing.      Comments: Chronically ill appearing   HENT:      Head: Normocephalic and atraumatic.      Right Ear: External ear normal.      Left Ear: External ear normal.      Nose: Nose normal.      Mouth/Throat:      Mouth: Mucous membranes are moist.      Comments: trach  Eyes:      Conjunctiva/sclera: Conjunctivae normal.      Pupils: Pupils are equal, round, and reactive to light.   Cardiovascular:      Rate and Rhythm: Regular rhythm. Tachycardia present.      Pulses: Normal pulses.      Heart sounds: Normal heart sounds.   Pulmonary:      Effort: No respiratory distress.      Comments: clear breath sounds anteriorly  Abdominal:      Palpations: Abdomen  is soft.   Musculoskeletal:         General: Normal range of motion.      Cervical back: Neck supple.   Lymphadenopathy:      Cervical: No cervical adenopathy.   Skin:     General: Skin is warm and dry.      Coloration: Skin is not pale.   Neurological:      Mental Status: Mental status is at baseline.      Comments: Moves spontaneously, tracks with good eye   Psychiatric:         Behavior: Behavior normal.       Vents:  Vent Mode: A/C (03/04/22 0410)  Set Rate: 14 BPM (03/04/22 0410)  Vt Set: 480 mL (03/04/22 0410)  Pressure Support: 12 cmH20 (03/03/22 0808)  PEEP/CPAP: 5 cmH20 (03/04/22 0410)  Oxygen Concentration (%): 35 (03/04/22 0410)  Peak Airway Pressure: 19 cmH2O (03/04/22 0410)  Plateau Pressure: 11 cmH20 (02/28/22 0435)  Total Ve: 7.8 mL (03/04/22 0410)  F/VT Ratio<105 (RSBI): (!) 52.63 (03/02/22 1500)    Lines/Drains/Airways       Peripherally Inserted Central Catheter Line  Duration             PICC Double Lumen 01/05/22 left basilic 58 days              Central Venous Catheter Line  Duration                  Hemodialysis Catheter 12/30/21 0900 right internal jugular 64 days              Drain  Duration                  NG/OG Tube [place] sump 18 Fr. Left nostril -- days         Colostomy 12/18/21 1030 Descending/sigmoid LUQ 75 days    Male External Urinary Catheter 02/21/22 Medium 11 days              Airway  Duration                  Surgical Airway 01/04/22 Shiley 59 days                    Significant Labs:    CBC/Anemia Profile:  No results for input(s): WBC, HGB, HCT, PLT, MCV, RDW, IRON, FERRITIN, RETIC, FOLATE, [id number], OCCULTBLOOD in the last 48 hours.       Chemistries:  No results for input(s): NA, K, CL, CO2, BUN, CREATININE, CALCIUM, ALBUMIN, PROT, BILITOT, ALKPHOS, ALT, AST, GLUCOSE, MG, PHOS in the last 48 hours.      All pertinent labs within the past 24 hours have been reviewed.    Significant Imaging:  I have reviewed all pertinent imaging results/findings within the past 24  hours.    Assessment/Plan:     * Denice gangrene  - s/p multiple debridements  - wound vac in place  - ID consulted for antibiotic management: He was treated with rocephin, daptomycin, clindamycin. 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 Cazares  2/22- back to OR yesterday for skin grafting  3/2- ask surgery to eval for possible PEG tube placement   3/4- plan for possible surgery this next week    Fever  2/26 -Tmax 101.4 -- resp culture - with GNB - will start Zosyn today - follow cultures   BC x 2 NGTD   2/28 - resp culture with pseudomonas and klebsiella  treat with zosyn for 7 days   Blood culture from 2/25 also growing Pseudomonas. Sensitive to zosyn. Will need to continue zosyn for at least 10 days total    ESRD (end stage renal disease)  Started on HD 12/30   Continue with HD per nephrology  TTS     Type 2 diabetes mellitus without complication, without long-term current use of insulin  - continue ISS  - glucose stable/controlled      Acute blood loss anemia  Has required multiple transfusions this admission    Most recent Hgb is 7.8 - CBC in AM     Necrotizing fasciitis  S/p multiple surgeries     On mechanically assisted ventilation  - 12/14 intubated, tracheostomy 1/4    3/3- increased to 8 hours BID     Hypertension  - continue diltiazem  - 3/02- bp is on lower side today while on HD                  eCcil Abernathy, DO  Pulmonology  [facility]

## 2022-03-05 NOTE — SUBJECTIVE & OBJECTIVE
Interval History: No acute events overnight. Currently hemodynamically stable. Oxygenating adequately on the ventilator. Currently afebrile. Doing well with breathing trials.     Objective:     Vital Signs (Most Recent):  Temp: 99 °F (37.2 °C) (03/05/22 0800)  Pulse: 97 (03/05/22 1000)  Resp: (!) 26 (03/05/22 1000)  BP: (!) 103/51 (03/05/22 1000)  SpO2: 100 % (03/05/22 1000) Vital Signs (24h Range):  Temp:  [97.7 °F (36.5 °C)-99.7 °F (37.6 °C)] 99 °F (37.2 °C)  Pulse:  [] 97  Resp:  [11-38] 26  SpO2:  [96 %-100 %] 100 %  BP: ()/(47-76) 103/51     Weight: 80.7 kg (178 lb)  Body mass index is 24.83 kg/m².      Intake/Output Summary (Last 24 hours) at 3/5/2022 1049  Last data filed at 3/5/2022 0600  Gross per 24 hour   Intake 1300 ml   Output 3200 ml   Net -1900 ml         Physical Exam  Vitals reviewed.   Constitutional:       General: He is not in acute distress.     Appearance: He is not ill-appearing.      Comments: Chronically ill appearing   HENT:      Head: Normocephalic and atraumatic.      Right Ear: External ear normal.      Left Ear: External ear normal.      Nose: Nose normal.      Mouth/Throat:      Mouth: Mucous membranes are moist.      Comments: trach  Eyes:      Conjunctiva/sclera: Conjunctivae normal.      Pupils: Pupils are equal, round, and reactive to light.   Cardiovascular:      Rate and Rhythm: Regular rhythm. Tachycardia present.      Pulses: Normal pulses.      Heart sounds: Normal heart sounds.   Pulmonary:      Effort: No respiratory distress.      Comments: clear breath sounds anteriorly  Abdominal:      Palpations: Abdomen is soft.   Musculoskeletal:         General: Normal range of motion.      Cervical back: Neck supple.   Lymphadenopathy:      Cervical: No cervical adenopathy.   Skin:     General: Skin is warm and dry.      Coloration: Skin is not pale.   Neurological:      Mental Status: Mental status is at baseline.      Comments: Moves spontaneously, tracks with good eye    Psychiatric:         Behavior: Behavior normal.       Vents:  Vent Mode: CPAP/PSV (03/05/22 0510)  Set Rate: 14 BPM (03/04/22 0915)  Vt Set: 480 mL (03/04/22 1715)  Pressure Support: 12 cmH20 (03/04/22 1325)  PEEP/CPAP: 5 cmH20 (03/05/22 0510)  Oxygen Concentration (%): 35 (03/04/22 2000)  Peak Airway Pressure: 19 cmH2O (03/05/22 0510)  Plateau Pressure: 11 cmH20 (02/28/22 0435)  Total Ve: 7.8 mL (03/05/22 0510)  F/VT Ratio<105 (RSBI): (!) 88.08 (03/04/22 0915)    Lines/Drains/Airways       Peripherally Inserted Central Catheter Line  Duration             PICC Double Lumen 01/05/22 left basilic 59 days              Central Venous Catheter Line  Duration                  Hemodialysis Catheter 12/30/21 0900 right internal jugular 65 days              Drain  Duration                  NG/OG Tube [place] sump 18 Fr. Left nostril -- days         Colostomy 12/18/21 1030 Descending/sigmoid LUQ 77 days    Male External Urinary Catheter 02/21/22 Medium 12 days              Airway  Duration                  Surgical Airway 01/04/22 Shiley 60 days                    Significant Labs:    CBC/Anemia Profile:  No results for input(s): WBC, HGB, HCT, PLT, MCV, RDW, IRON, FERRITIN, RETIC, FOLATE, [id number], OCCULTBLOOD in the last 48 hours.       Chemistries:  No results for input(s): NA, K, CL, CO2, BUN, CREATININE, CALCIUM, ALBUMIN, PROT, BILITOT, ALKPHOS, ALT, AST, GLUCOSE, MG, PHOS in the last 48 hours.      All pertinent labs within the past 24 hours have been reviewed.    Significant Imaging:  I have reviewed all pertinent imaging results/findings within the past 24 hours.

## 2022-03-05 NOTE — PROGRESS NOTES
[facility]  Pulmonology  Progress Note    Patient Name: Og Garcia  MRN: [id number]  Admission Date: 12/23/2021  Hospital Length of Stay: 72 days  Code Status: Prior  Attending Provider: Cecil Abernathy DO  Primary Care Provider: Hector Arndt DNP, FNP-C   Principal Problem: Denice gangrene    Subjective:     Interval History: No acute events overnight. Currently hemodynamically stable. Oxygenating adequately on the ventilator. Currently afebrile. Doing well with breathing trials.     Objective:     Vital Signs (Most Recent):  Temp: 99 °F (37.2 °C) (03/05/22 0800)  Pulse: 97 (03/05/22 1000)  Resp: (!) 26 (03/05/22 1000)  BP: (!) 103/51 (03/05/22 1000)  SpO2: 100 % (03/05/22 1000) Vital Signs (24h Range):  Temp:  [97.7 °F (36.5 °C)-99.7 °F (37.6 °C)] 99 °F (37.2 °C)  Pulse:  [] 97  Resp:  [11-38] 26  SpO2:  [96 %-100 %] 100 %  BP: ()/(47-76) 103/51     Weight: 80.7 kg (178 lb)  Body mass index is 24.83 kg/m².      Intake/Output Summary (Last 24 hours) at 3/5/2022 1049  Last data filed at 3/5/2022 0600  Gross per 24 hour   Intake 1300 ml   Output 3200 ml   Net -1900 ml         Physical Exam  Vitals reviewed.   Constitutional:       General: He is not in acute distress.     Appearance: He is not ill-appearing.      Comments: Chronically ill appearing   HENT:      Head: Normocephalic and atraumatic.      Right Ear: External ear normal.      Left Ear: External ear normal.      Nose: Nose normal.      Mouth/Throat:      Mouth: Mucous membranes are moist.      Comments: trach  Eyes:      Conjunctiva/sclera: Conjunctivae normal.      Pupils: Pupils are equal, round, and reactive to light.   Cardiovascular:      Rate and Rhythm: Regular rhythm. Tachycardia present.      Pulses: Normal pulses.      Heart sounds: Normal heart sounds.   Pulmonary:      Effort: No respiratory distress.      Comments: clear breath sounds anteriorly  Abdominal:      Palpations: Abdomen is soft.    Musculoskeletal:         General: Normal range of motion.      Cervical back: Neck supple.   Lymphadenopathy:      Cervical: No cervical adenopathy.   Skin:     General: Skin is warm and dry.      Coloration: Skin is not pale.   Neurological:      Mental Status: Mental status is at baseline.      Comments: Moves spontaneously, tracks with good eye   Psychiatric:         Behavior: Behavior normal.       Vents:  Vent Mode: CPAP/PSV (03/05/22 0510)  Set Rate: 14 BPM (03/04/22 0915)  Vt Set: 480 mL (03/04/22 1715)  Pressure Support: 12 cmH20 (03/04/22 1325)  PEEP/CPAP: 5 cmH20 (03/05/22 0510)  Oxygen Concentration (%): 35 (03/04/22 2000)  Peak Airway Pressure: 19 cmH2O (03/05/22 0510)  Plateau Pressure: 11 cmH20 (02/28/22 0435)  Total Ve: 7.8 mL (03/05/22 0510)  F/VT Ratio<105 (RSBI): (!) 88.08 (03/04/22 0915)    Lines/Drains/Airways       Peripherally Inserted Central Catheter Line  Duration             PICC Double Lumen 01/05/22 left basilic 59 days              Central Venous Catheter Line  Duration                  Hemodialysis Catheter 12/30/21 0900 right internal jugular 65 days              Drain  Duration                  NG/OG Tube [place] sump 18 Fr. Left nostril -- days         Colostomy 12/18/21 1030 Descending/sigmoid LUQ 77 days    Male External Urinary Catheter 02/21/22 Medium 12 days              Airway  Duration                  Surgical Airway 01/04/22 Shiley 60 days                    Significant Labs:    CBC/Anemia Profile:  No results for input(s): WBC, HGB, HCT, PLT, MCV, RDW, IRON, FERRITIN, RETIC, FOLATE, [id number], OCCULTBLOOD in the last 48 hours.       Chemistries:  No results for input(s): NA, K, CL, CO2, BUN, CREATININE, CALCIUM, ALBUMIN, PROT, BILITOT, ALKPHOS, ALT, AST, GLUCOSE, MG, PHOS in the last 48 hours.      All pertinent labs within the past 24 hours have been reviewed.    Significant Imaging:  I have reviewed all pertinent imaging results/findings within the past 24  hours.    Assessment/Plan:     * Denice gangrene  - s/p multiple debridements  - wound vac in place  - ID consulted for antibiotic management: He was treated with rocephin, daptomycin, clindamycin. 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 Cazares  2/22- back to OR yesterday for skin grafting  3/2- ask surgery to eval for possible PEG tube placement   3/4- plan for possible surgery this next week    Type 2 diabetes mellitus without complication, without long-term current use of insulin  - continue ISS  - glucose stable/controlled      Acute blood loss anemia  Has required multiple transfusions this admission    Most recent Hgb is 7.8 - CBC in AM     Necrotizing fasciitis  S/p multiple surgeries     On mechanically assisted ventilation  - 12/14 intubated, tracheostomy 1/4    3/3- increased to 8 hours BID   3/5- continue with current plan. If he does well through the weekend we will push to continuous Monday    Hypertension  - continue diltiazem  - 3/02- bp is on lower side today while on HD   3/5- bp remains low normal                 Cecil Abernathy, DO  Pulmonology  [facility]

## 2022-03-05 NOTE — PLAN OF CARE
Problem: Adult Inpatient Plan of Care  Goal: Plan of Care Review  Outcome: Ongoing, Progressing     Problem: Communication Impairment (Mechanical Ventilation, Invasive)  Goal: Effective Communication  Outcome: Ongoing, Progressing     Problem: Device-Related Complication Risk (Mechanical Ventilation, Invasive)  Goal: Optimal Device Function  Outcome: Ongoing, Progressing     Problem: Inability to Wean (Mechanical Ventilation, Invasive)  Goal: Mechanical Ventilation Liberation  Outcome: Ongoing, Progressing     Problem: Nutrition Impairment (Mechanical Ventilation, Invasive)  Goal: Optimal Nutrition Delivery  Outcome: Ongoing, Progressing     Problem: Skin and Tissue Injury (Mechanical Ventilation, Invasive)  Goal: Absence of Device-Related Skin and Tissue Injury  Outcome: Ongoing, Progressing     Problem: Ventilator-Induced Lung Injury (Mechanical Ventilation, Invasive)  Goal: Absence of Ventilator-Induced Lung Injury  Outcome: Ongoing, Progressing     Problem: Communication Impairment (Artificial Airway)  Goal: Effective Communication  Outcome: Ongoing, Progressing     Problem: Device-Related Complication Risk (Artificial Airway)  Goal: Optimal Device Function  Outcome: Ongoing, Progressing     Problem: Skin and Tissue Injury (Artificial Airway)  Goal: Absence of Device-Related Skin or Tissue Injury  Outcome: Ongoing, Progressing     Problem: Noninvasive Ventilation Acute  Goal: Effective Unassisted Ventilation and Oxygenation  Outcome: Ongoing, Progressing     Problem: Skin Injury Risk Increased  Goal: Skin Health and Integrity  Outcome: Ongoing, Progressing     Problem: Gas Exchange Impaired  Goal: Optimal Gas Exchange  Outcome: Ongoing, Progressing     Problem: Adult Inpatient Plan of Care  Goal: Plan of Care Review  Outcome: Ongoing, Progressing     Problem: Communication Impairment (Mechanical Ventilation, Invasive)  Goal: Effective Communication  Outcome: Ongoing, Progressing     Problem: Device-Related  Complication Risk (Mechanical Ventilation, Invasive)  Goal: Optimal Device Function  Outcome: Ongoing, Progressing     Problem: Inability to Wean (Mechanical Ventilation, Invasive)  Goal: Mechanical Ventilation Liberation  Outcome: Ongoing, Progressing     Problem: Nutrition Impairment (Mechanical Ventilation, Invasive)  Goal: Optimal Nutrition Delivery  Outcome: Ongoing, Progressing     Problem: Skin and Tissue Injury (Mechanical Ventilation, Invasive)  Goal: Absence of Device-Related Skin and Tissue Injury  Outcome: Ongoing, Progressing     Problem: Ventilator-Induced Lung Injury (Mechanical Ventilation, Invasive)  Goal: Absence of Ventilator-Induced Lung Injury  Outcome: Ongoing, Progressing     Problem: Device-Related Complication Risk (Artificial Airway)  Goal: Optimal Device Function  Outcome: Ongoing, Progressing     Problem: Skin and Tissue Injury (Artificial Airway)  Goal: Absence of Device-Related Skin or Tissue Injury  Outcome: Ongoing, Progressing     Problem: Noninvasive Ventilation Acute  Goal: Effective Unassisted Ventilation and Oxygenation  Outcome: Ongoing, Progressing     Problem: Communication Impairment (Artificial Airway)  Goal: Effective Communication  Outcome: Ongoing, Progressing     Problem: Skin Injury Risk Increased  Goal: Skin Health and Integrity  Outcome: Ongoing, Progressing     Problem: Gas Exchange Impaired  Goal: Optimal Gas Exchange  Outcome: Ongoing, Progressing

## 2022-03-05 NOTE — SUBJECTIVE & OBJECTIVE
Interval History:  He remains on the ventilator via tracheostomy    Review of patient's allergies indicates:  No Known Allergies  Current Facility-Administered Medications   Medication Frequency    0.9%  NaCl infusion (for blood administration) Q24H PRN    0.9%  NaCl infusion PRN    acetaminophen tablet 500 mg Q6H PRN    albuterol nebulizer solution 2.5 mg Q4H PRN    bisacodyL EC tablet 10 mg Daily PRN    calcium gluconate 1 g in dextrose 5 % in water (D5W) 5 % 100 mL IVPB (MB+) Once    dextromethorphan-guaiFENesin  mg/5 ml liquid 10 mL Q6H PRN    dextrose 50% injection 12.5 g PRN    diltiaZEM tablet 60 mg Q6H    fentaNYL 50 mcg/hr 1 patch Q72H    glucagon (human recombinant) injection 1 mg PRN    guaiFENesin 100 mg/5 ml syrup 200 mg Q6H    heparin (porcine) injection 4,000 Units PRN    insulin aspart U-100 injection 1-10 Units Q6H    lorazepam injection 2 mg Q6H PRN    ondansetron injection 8 mg Q6H PRN    pantoprazole suspension 40 mg Daily    sevelamer carbonate pwpk 0.8 g TID WM    silver sulfADIAZINE 1% cream PRN    simethicone chewable tablet 80 mg TID PRN       Objective:     Vital Signs (Most Recent):  Temp: 98.5 °F (36.9 °C) (03/05/22 1500)  Pulse: 100 (03/05/22 1600)  Resp: (!) 24 (03/05/22 1600)  BP: (!) 108/56 (03/05/22 1600)  SpO2: 100 % (03/05/22 1600)  O2 Device (Oxygen Therapy): ventilator (03/05/22 1305)   Vital Signs (24h Range):  Temp:  [97.7 °F (36.5 °C)-99.3 °F (37.4 °C)] 98.5 °F (36.9 °C)  Pulse:  [] 100  Resp:  [11-26] 24  SpO2:  [96 %-100 %] 100 %  BP: ()/(47-64) 108/56     Weight: 80.7 kg (178 lb) (03/05/22 0315)  Body mass index is 24.83 kg/m².  Body surface area is 2.01 meters squared.    I/O last 3 completed shifts:  In: 1300 [Other:500; NG/GT:700; IV Piggyback:100]  Out: 3400 [Other:3000; Stool:400]    Physical Exam  Vitals reviewed.   Constitutional:       General: He is not in acute distress.     Appearance: He is ill-appearing.   HENT:      Head: Normocephalic and  atraumatic.      Mouth/Throat:      Comments: trach  Eyes:      Pupils: Pupils are equal, round, and reactive to light.   Cardiovascular:      Rate and Rhythm: Regular rhythm. Tachycardia present.      Heart sounds: Normal heart sounds.   Pulmonary:      Effort: Pulmonary effort is normal. No respiratory distress.      Comments: Ventilated, resting comfortably.  Abdominal:      General: Bowel sounds are normal.      Palpations: Abdomen is soft.   Musculoskeletal:         General: Normal range of motion.      Cervical back: Neck supple.      Right lower leg: Edema present.      Left lower leg: Edema present.   Lymphadenopathy:      Cervical: No cervical adenopathy.   Skin:     General: Skin is warm and dry.   Neurological:      Cranial Nerves: No cranial nerve deficit.       Significant Labs:  BMP: No results for input(s): GLU, NA, K, CL, CO2, BUN, CREATININE, CALCIUM, MG in the last 168 hours.  All labs within the past 24 hours have been reviewed.     Significant Imaging:

## 2022-03-05 NOTE — NURSING
At bedrest pt on cpap no distress noted assessments  done total care required with daily ADLS has trach in place .. fentanyl [id number] IN PLACE TO LEFT UPPER ARM condom catheter in use for incontinant urine episodes .. will continue to monitor .. pt has left arm picc line in place.right subclavian hd catheter intact   drsgs patent to mid abdomin and sacral  Areas.. comfort and safety measures ongoing

## 2022-03-05 NOTE — PROGRESS NOTES
[facility]  Nephrology  Progress Note    Patient Name: Og Garcia  MRN: [id number]  Admission Date: 12/23/2021  Hospital Length of Stay: 72 days  Attending Provider: Cecil Abernathy DO   Primary Care Physician: Hector Arndt DNP, FNP-C  Principal Problem:Denice gangrene    Subjective:     HPI: The patient is known from the previous nephrology consult at [facility].  He is no at Specialty and continues to need dialysis support.      Interval History:  He remains on the ventilator via tracheostomy    Review of patient's allergies indicates:  No Known Allergies  Current Facility-Administered Medications   Medication Frequency    0.9%  NaCl infusion (for blood administration) Q24H PRN    0.9%  NaCl infusion PRN    acetaminophen tablet 500 mg Q6H PRN    albuterol nebulizer solution 2.5 mg Q4H PRN    bisacodyL EC tablet 10 mg Daily PRN    calcium gluconate 1 g in dextrose 5 % in water (D5W) 5 % 100 mL IVPB (MB+) Once    dextromethorphan-guaiFENesin  mg/5 ml liquid 10 mL Q6H PRN    dextrose 50% injection 12.5 g PRN    diltiaZEM tablet 60 mg Q6H    fentaNYL 50 mcg/hr 1 patch Q72H    glucagon (human recombinant) injection 1 mg PRN    guaiFENesin 100 mg/5 ml syrup 200 mg Q6H    heparin (porcine) injection 4,000 Units PRN    insulin aspart U-100 injection 1-10 Units Q6H    lorazepam injection 2 mg Q6H PRN    ondansetron injection 8 mg Q6H PRN    pantoprazole suspension 40 mg Daily    sevelamer carbonate pwpk 0.8 g TID WM    silver sulfADIAZINE 1% cream PRN    simethicone chewable tablet 80 mg TID PRN       Objective:     Vital Signs (Most Recent):  Temp: 98.5 °F (36.9 °C) (03/05/22 1500)  Pulse: 100 (03/05/22 1600)  Resp: (!) 24 (03/05/22 1600)  BP: (!) 108/56 (03/05/22 1600)  SpO2: 100 % (03/05/22 1600)  O2 Device (Oxygen Therapy): ventilator (03/05/22 1305)   Vital Signs (24h Range):  Temp:  [97.7 °F (36.5 °C)-99.3 °F (37.4 °C)] 98.5 °F (36.9 °C)  Pulse:  []  100  Resp:  [11-26] 24  SpO2:  [96 %-100 %] 100 %  BP: ()/(47-64) 108/56     Weight: 80.7 kg (178 lb) (03/05/22 0315)  Body mass index is 24.83 kg/m².  Body surface area is 2.01 meters squared.    I/O last 3 completed shifts:  In: 1300 [Other:500; NG/GT:700; IV Piggyback:100]  Out: 3400 [Other:3000; Stool:400]    Physical Exam  Vitals reviewed.   Constitutional:       General: He is not in acute distress.     Appearance: He is ill-appearing.   HENT:      Head: Normocephalic and atraumatic.      Mouth/Throat:      Comments: trach  Eyes:      Pupils: Pupils are equal, round, and reactive to light.   Cardiovascular:      Rate and Rhythm: Regular rhythm. Tachycardia present.      Heart sounds: Normal heart sounds.   Pulmonary:      Effort: Pulmonary effort is normal. No respiratory distress.      Comments: Ventilated, resting comfortably.  Abdominal:      General: Bowel sounds are normal.      Palpations: Abdomen is soft.   Musculoskeletal:         General: Normal range of motion.      Cervical back: Neck supple.      Right lower leg: Edema present.      Left lower leg: Edema present.   Lymphadenopathy:      Cervical: No cervical adenopathy.   Skin:     General: Skin is warm and dry.   Neurological:      Cranial Nerves: No cranial nerve deficit.       Significant Labs:  BMP: No results for input(s): GLU, NA, K, CL, CO2, BUN, CREATININE, CALCIUM, MG in the last 168 hours.  All labs within the past 24 hours have been reviewed.     Significant Imaging:      Assessment/Plan:     * Denice gangrene  Continue wound care      ESRD (end stage renal disease)  Dialysis MWF    On mechanically assisted ventilation  Wean as tolerated          Thank you for your consult.     Huber Mcnamara MD  Nephrology  [facility]

## 2022-03-05 NOTE — ASSESSMENT & PLAN NOTE
- 12/14 intubated, tracheostomy 1/4    3/3- increased to 8 hours BID   3/5- continue with current plan. If he does well through the weekend we will push to continuous Monday

## 2022-03-06 NOTE — ASSESSMENT & PLAN NOTE
- s/p multiple debridements  - wound vac in place  - ID consulted for antibiotic management: He was treated with rocephin, daptomycin, clindamycin. 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 Cazares  2/22- back to OR yesterday for skin grafting  3/2- ask surgery to eval for possible PEG tube placement   3/6- plan for possible surgery this next week

## 2022-03-06 NOTE — PROGRESS NOTES
[facility]  Pulmonology  Progress Note    Patient Name: Og Garcia  MRN: [id number]  Admission Date: 12/23/2021  Hospital Length of Stay: 73 days  Code Status: Prior  Attending Provider: Cecil Abernathy DO  Primary Care Provider: Hector Arndt DNP, FNP-C   Principal Problem: Denice gangrene    Subjective:     Interval History: No acute events overnight. Currently hemodynamically stable. Oxygenating adequately on the ventilator. Currently afebrile. Doing well with breathing trials.     Objective:     Vital Signs (Most Recent):  Temp: 98.5 °F (36.9 °C) (03/06/22 0300)  Pulse: 110 (03/06/22 0600)  Resp: 19 (03/06/22 0600)  BP: 122/63 (03/06/22 0617)  SpO2: 100 % (03/06/22 0600) Vital Signs (24h Range):  Temp:  [98.5 °F (36.9 °C)-99.2 °F (37.3 °C)] 98.5 °F (36.9 °C)  Pulse:  [] 110  Resp:  [12-26] 19  SpO2:  [94 %-100 %] 100 %  BP: ()/(48-73) 122/63     Weight: 78.1 kg (172 lb 2.9 oz)  Body mass index is 24.01 kg/m².      Intake/Output Summary (Last 24 hours) at 3/6/2022 1002  Last data filed at 3/6/2022 0600  Gross per 24 hour   Intake 1400 ml   Output 0 ml   Net 1400 ml         Physical Exam  Vitals reviewed.   Constitutional:       General: He is not in acute distress.     Appearance: He is not ill-appearing.      Comments: Chronically ill appearing   HENT:      Head: Normocephalic and atraumatic.      Right Ear: External ear normal.      Left Ear: External ear normal.      Nose: Nose normal.      Mouth/Throat:      Mouth: Mucous membranes are moist.      Comments: trach  Eyes:      Conjunctiva/sclera: Conjunctivae normal.      Pupils: Pupils are equal, round, and reactive to light.   Cardiovascular:      Rate and Rhythm: Regular rhythm. Tachycardia present.      Pulses: Normal pulses.      Heart sounds: Normal heart sounds.   Pulmonary:      Effort: No respiratory distress.      Comments: clear breath sounds anteriorly  Abdominal:      Palpations: Abdomen is soft.    Musculoskeletal:         General: Normal range of motion.      Cervical back: Neck supple.   Lymphadenopathy:      Cervical: No cervical adenopathy.   Skin:     General: Skin is warm and dry.      Coloration: Skin is not pale.   Neurological:      Mental Status: Mental status is at baseline.      Comments: Moves spontaneously, tracks with good eye   Psychiatric:         Behavior: Behavior normal.       Vents:  Vent Mode: A/C (03/06/22 0430)  Set Rate: 14 BPM (03/06/22 0430)  Vt Set: 480 mL (03/06/22 0430)  Pressure Support: 12 cmH20 (03/06/22 0017)  PEEP/CPAP: 5 cmH20 (03/06/22 0430)  Oxygen Concentration (%): 35 (03/06/22 0600)  Peak Airway Pressure: 9 cmH2O (03/06/22 0430)  Plateau Pressure: 11 cmH20 (02/28/22 0435)  Total Ve: 19 mL (03/06/22 0430)  F/VT Ratio<105 (RSBI): (!) 88.08 (03/04/22 0915)    Lines/Drains/Airways       Peripherally Inserted Central Catheter Line  Duration             PICC Double Lumen 01/05/22 left basilic 60 days              Central Venous Catheter Line  Duration                  Hemodialysis Catheter 12/30/21 0900 right internal jugular 66 days              Drain  Duration                  NG/OG Tube [place] sump 18 Fr. Left nostril -- days         Colostomy 12/18/21 1030 Descending/sigmoid LUQ 77 days    Male External Urinary Catheter 02/21/22 Medium 13 days              Airway  Duration                  Surgical Airway 01/04/22 Shiley 61 days                    Significant Labs:    CBC/Anemia Profile:  No results for input(s): WBC, HGB, HCT, PLT, MCV, RDW, IRON, FERRITIN, RETIC, FOLATE, [id number], OCCULTBLOOD in the last 48 hours.       Chemistries:  No results for input(s): NA, K, CL, CO2, BUN, CREATININE, CALCIUM, ALBUMIN, PROT, BILITOT, ALKPHOS, ALT, AST, GLUCOSE, MG, PHOS in the last 48 hours.      All pertinent labs within the past 24 hours have been reviewed.    Significant Imaging:  I have reviewed all pertinent imaging results/findings within the past 24  hours.    Assessment/Plan:     * Denice gangrene  - s/p multiple debridements  - wound vac in place  - ID consulted for antibiotic management: He was treated with rocephin, daptomycin, clindamycin. 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 Cazares  2/22- back to OR yesterday for skin grafting  3/2- ask surgery to eval for possible PEG tube placement   3/6- plan for possible surgery this next week    Fever  2/26 -Tmax 101.4 -- resp culture - with GNB - will start Zosyn today - follow cultures   BC x 2 NGTD   2/28 - resp culture with pseudomonas and klebsiella  treat with zosyn for 7 days   Blood culture from 2/25 also growing Pseudomonas. Sensitive to zosyn. Will need to continue zosyn for at least 10 days total    Type 2 diabetes mellitus without complication, without long-term current use of insulin  - continue ISS  - glucose stable/controlled      Acute blood loss anemia  Has required multiple transfusions this admission    Most recent Hgb is 7.8 - CBC in AM     Necrotizing fasciitis  S/p multiple surgeries     On mechanically assisted ventilation  - 12/14 intubated, tracheostomy 1/4    3/3- increased to 8 hours BID   3/5- continue with current plan. If he does well through the weekend we will push to continuous Monday  3/6- increase PSV/CPAP to 5 hours tid today    Hypertension  - continue diltiazem  - 3/02- bp is on lower side today while on HD   3/5- bp remains low normal                 Cecil Abernathy, DO  Pulmonology  [facility]

## 2022-03-06 NOTE — NURSING
Bedresting throughout the shift. Tolerating CPAP trials without resp distress. Fentanyl patch noted to Left arm. Left nare NG tube intact with Vital infusing.Dialysis cath noted to right chest wall. Dsg's intact to abdomen, austyn feet, right thigh, and sacral intact without drainage noted.SCD hose and waffle boots in use. Colostomy noted to left side of abd with small amount of soft brown stool noted. No output noted from condom cath. PICC line noted to left arm. One visitor noted during shift today. Safety measures noted.

## 2022-03-06 NOTE — ASSESSMENT & PLAN NOTE
- 12/14 intubated, tracheostomy 1/4    3/3- increased to 8 hours BID   3/5- continue with current plan. If he does well through the weekend we will push to continuous Monday  3/6- increase PSV/CPAP to 5 hours tid today

## 2022-03-06 NOTE — SUBJECTIVE & OBJECTIVE
Interval History: No acute events overnight. Currently hemodynamically stable. Oxygenating adequately on the ventilator. Currently afebrile. Doing well with breathing trials.     Objective:     Vital Signs (Most Recent):  Temp: 98.5 °F (36.9 °C) (03/06/22 0300)  Pulse: 110 (03/06/22 0600)  Resp: 19 (03/06/22 0600)  BP: 122/63 (03/06/22 0617)  SpO2: 100 % (03/06/22 0600) Vital Signs (24h Range):  Temp:  [98.5 °F (36.9 °C)-99.2 °F (37.3 °C)] 98.5 °F (36.9 °C)  Pulse:  [] 110  Resp:  [12-26] 19  SpO2:  [94 %-100 %] 100 %  BP: ()/(48-73) 122/63     Weight: 78.1 kg (172 lb 2.9 oz)  Body mass index is 24.01 kg/m².      Intake/Output Summary (Last 24 hours) at 3/6/2022 1002  Last data filed at 3/6/2022 0600  Gross per 24 hour   Intake 1400 ml   Output 0 ml   Net 1400 ml         Physical Exam  Vitals reviewed.   Constitutional:       General: He is not in acute distress.     Appearance: He is not ill-appearing.      Comments: Chronically ill appearing   HENT:      Head: Normocephalic and atraumatic.      Right Ear: External ear normal.      Left Ear: External ear normal.      Nose: Nose normal.      Mouth/Throat:      Mouth: Mucous membranes are moist.      Comments: trach  Eyes:      Conjunctiva/sclera: Conjunctivae normal.      Pupils: Pupils are equal, round, and reactive to light.   Cardiovascular:      Rate and Rhythm: Regular rhythm. Tachycardia present.      Pulses: Normal pulses.      Heart sounds: Normal heart sounds.   Pulmonary:      Effort: No respiratory distress.      Comments: clear breath sounds anteriorly  Abdominal:      Palpations: Abdomen is soft.   Musculoskeletal:         General: Normal range of motion.      Cervical back: Neck supple.   Lymphadenopathy:      Cervical: No cervical adenopathy.   Skin:     General: Skin is warm and dry.      Coloration: Skin is not pale.   Neurological:      Mental Status: Mental status is at baseline.      Comments: Moves spontaneously, tracks with good eye    Psychiatric:         Behavior: Behavior normal.       Vents:  Vent Mode: A/C (03/06/22 0430)  Set Rate: 14 BPM (03/06/22 0430)  Vt Set: 480 mL (03/06/22 0430)  Pressure Support: 12 cmH20 (03/06/22 0017)  PEEP/CPAP: 5 cmH20 (03/06/22 0430)  Oxygen Concentration (%): 35 (03/06/22 0600)  Peak Airway Pressure: 9 cmH2O (03/06/22 0430)  Plateau Pressure: 11 cmH20 (02/28/22 0435)  Total Ve: 19 mL (03/06/22 0430)  F/VT Ratio<105 (RSBI): (!) 88.08 (03/04/22 0915)    Lines/Drains/Airways       Peripherally Inserted Central Catheter Line  Duration             PICC Double Lumen 01/05/22 left basilic 60 days              Central Venous Catheter Line  Duration                  Hemodialysis Catheter 12/30/21 0900 right internal jugular 66 days              Drain  Duration                  NG/OG Tube [place] sump 18 Fr. Left nostril -- days         Colostomy 12/18/21 1030 Descending/sigmoid LUQ 77 days    Male External Urinary Catheter 02/21/22 Medium 13 days              Airway  Duration                  Surgical Airway 01/04/22 Shiley 61 days                    Significant Labs:    CBC/Anemia Profile:  No results for input(s): WBC, HGB, HCT, PLT, MCV, RDW, IRON, FERRITIN, RETIC, FOLATE, [id number], OCCULTBLOOD in the last 48 hours.       Chemistries:  No results for input(s): NA, K, CL, CO2, BUN, CREATININE, CALCIUM, ALBUMIN, PROT, BILITOT, ALKPHOS, ALT, AST, GLUCOSE, MG, PHOS in the last 48 hours.      All pertinent labs within the past 24 hours have been reviewed.    Significant Imaging:  I have reviewed all pertinent imaging results/findings within the past 24 hours.

## 2022-03-07 NOTE — SUBJECTIVE & OBJECTIVE
Interval History:  He remains on the ventilator.  Blood pressure is borderline low    Review of patient's allergies indicates:  No Known Allergies  Current Facility-Administered Medications   Medication Frequency    0.9%  NaCl infusion (for blood administration) Q24H PRN    0.9%  NaCl infusion PRN    acetaminophen tablet 500 mg Q6H PRN    albuterol nebulizer solution 2.5 mg Q4H PRN    bisacodyL EC tablet 10 mg Daily PRN    calcium gluconate 1 g in dextrose 5 % in water (D5W) 5 % 100 mL IVPB (MB+) Once    dextromethorphan-guaiFENesin  mg/5 ml liquid 10 mL Q6H PRN    dextrose 50% injection 12.5 g PRN    diltiaZEM tablet 60 mg Q6H    fentaNYL 50 mcg/hr 1 patch Q72H    glucagon (human recombinant) injection 1 mg PRN    guaiFENesin 100 mg/5 ml syrup 200 mg Q6H    heparin (porcine) injection 4,000 Units PRN    insulin aspart U-100 injection 1-10 Units Q6H    lorazepam injection 2 mg Q6H PRN    ondansetron injection 8 mg Q6H PRN    pantoprazole suspension 40 mg Daily    sevelamer carbonate pwpk 0.8 g TID WM    silver sulfADIAZINE 1% cream PRN    simethicone chewable tablet 80 mg TID PRN       Objective:     Vital Signs (Most Recent):  Temp: 99 °F (37.2 °C) (03/06/22 1500)  Pulse: 99 (03/06/22 1845)  Resp: (!) 22 (03/06/22 1845)  BP: (!) 106/54 (03/06/22 1845)  SpO2: 100 % (03/06/22 1845)  O2 Device (Oxygen Therapy): ventilator (03/06/22 1511)   Vital Signs (24h Range):  Temp:  [98.5 °F (36.9 °C)-99.4 °F (37.4 °C)] 99 °F (37.2 °C)  Pulse:  [] 99  Resp:  [11-26] 22  SpO2:  [94 %-100 %] 100 %  BP: ()/(48-73) 106/54     Weight: 78.1 kg (172 lb 2.9 oz) (03/06/22 0600)  Body mass index is 24.01 kg/m².  Body surface area is 1.98 meters squared.    I/O last 3 completed shifts:  In: 1400 [NG/GT:1400]  Out: 0     Physical Exam  Vitals reviewed.   Constitutional:       General: He is not in acute distress.     Appearance: He is ill-appearing.   HENT:      Head: Normocephalic and atraumatic.      Mouth/Throat:       Comments: trach  Eyes:      Pupils: Pupils are equal, round, and reactive to light.      Comments: right eye enucleation   Cardiovascular:      Rate and Rhythm: Regular rhythm.      Heart sounds: Normal heart sounds.   Pulmonary:      Effort: Pulmonary effort is normal. No respiratory distress.      Comments: Ventilated, resting comfortably.  Abdominal:      General: Bowel sounds are normal.      Palpations: Abdomen is soft.   Musculoskeletal:         General: Normal range of motion.      Cervical back: Neck supple.      Right lower leg: Edema present.      Left lower leg: Edema present.   Lymphadenopathy:      Cervical: No cervical adenopathy.   Skin:     General: Skin is warm and dry.   Neurological:      Cranial Nerves: No cranial nerve deficit.       Significant Labs:  BMP: No results for input(s): GLU, NA, K, CL, CO2, BUN, CREATININE, CALCIUM, MG in the last 168 hours.  CBC: No results for input(s): WBC, RBC, HGB, HCT, PLT, MCV, MCH, MCHC in the last 168 hours.     Significant Imaging:

## 2022-03-07 NOTE — PROGRESS NOTES
[facility]  Pulmonology  Progress Note    Patient Name: Og Garcia  MRN: [id number]  Admission Date: 12/23/2021  Hospital Length of Stay: 74 days  Code Status: Prior  Attending Provider: Cecil Abernathy DO  Primary Care Provider: Hector Arndt DNP, FNP-C   Principal Problem: Denice gangrene    Subjective:     Interval History:  Patient without complaints    Objective:     Vital Signs (Most Recent):  Temp: 99 °F (37.2 °C) (03/06/22 2300)  Pulse: 102 (03/07/22 0420)  Resp: (!) 22 (03/07/22 0420)  BP: (!) 113/59 (03/07/22 0548)  SpO2: 100 % (03/07/22 0420)   Vital Signs (24h Range):  Temp:  [99 °F (37.2 °C)-99.6 °F (37.6 °C)] 99 °F (37.2 °C)  Pulse:  [] 102  Resp:  [11-25] 22  SpO2:  [94 %-100 %] 100 %  BP: ()/(48-70) 113/59     Weight: 78.1 kg (172 lb 2.9 oz)  Body mass index is 24.01 kg/m².      Intake/Output Summary (Last 24 hours) at 3/7/2022 0559  Last data filed at 3/6/2022 0735  Gross per 24 hour   Intake 1050 ml   Output 0 ml   Net 1050 ml       Physical Exam  Vitals reviewed.   Constitutional:       Appearance: Normal appearance.      Interventions: He is not intubated.  HENT:      Head: Normocephalic and atraumatic.      Nose: Nose normal.      Mouth/Throat:      Mouth: Mucous membranes are dry.      Pharynx: Oropharynx is clear.   Eyes:      Extraocular Movements: Extraocular movements intact.      Conjunctiva/sclera: Conjunctivae normal.      Pupils: Pupils are equal, round, and reactive to light.   Cardiovascular:      Rate and Rhythm: Normal rate.      Heart sounds: Normal heart sounds. No murmur heard.  Pulmonary:      Effort: Pulmonary effort is normal. He is not intubated.      Breath sounds: Normal breath sounds.   Abdominal:      General: Abdomen is flat. Bowel sounds are normal.      Palpations: Abdomen is soft.   Musculoskeletal:         General: Normal range of motion.      Cervical back: Normal range of motion and neck supple.      Right lower  leg: No edema.      Left lower leg: No edema.   Skin:     General: Skin is warm and dry.      Capillary Refill: Capillary refill takes less than 2 seconds.   Neurological:      General: No focal deficit present.      Mental Status: He is alert and oriented to person, place, and time.   Psychiatric:         Mood and Affect: Mood normal.         Behavior: Behavior normal.       Vents:  Vent Mode: A/C (03/07/22 0500)  Set Rate: 14 BPM (03/07/22 0500)  Vt Set: 480 mL (03/07/22 0500)  Pressure Support: 12 cmH20 (03/07/22 0500)  PEEP/CPAP: 5 cmH20 (03/07/22 0500)  Oxygen Concentration (%): 35 (03/07/22 0420)  Peak Airway Pressure: 18 cmH2O (03/07/22 0500)  Plateau Pressure: 20 cmH20 (03/07/22 0500)  Total Ve: 8.9 mL (03/07/22 0500)  F/VT Ratio<105 (RSBI): (!) 55.07 (03/07/22 0006)    Lines/Drains/Airways       Peripherally Inserted Central Catheter Line  Duration             PICC Double Lumen 01/05/22 left basilic 61 days              Central Venous Catheter Line  Duration                  Hemodialysis Catheter 12/30/21 0900 right internal jugular 66 days              Drain  Duration                  NG/OG Tube Sharp sump 18 Fr. Left nostril -- days         Colostomy 12/18/21 1030 Descending/sigmoid LUQ 78 days    Male External Urinary Catheter 02/21/22 Medium 14 days              Airway  Duration                  Surgical Airway 01/04/22 Shiley 62 days                    Significant Labs:    CBC/Anemia Profile:  No results for input(s): WBC, HGB, HCT, PLT, MCV, RDW, IRON, FERRITIN, RETIC, FOLATE, [id number], OCCULTBLOOD in the last 48 hours.     Chemistries:  No results for input(s): NA, K, CL, CO2, BUN, CREATININE, CALCIUM, ALBUMIN, PROT, BILITOT, ALKPHOS, ALT, AST, GLUCOSE, MG, PHOS in the last 48 hours.    All pertinent labs within the past 24 hours have been reviewed.    Significant Imaging:  I have reviewed all pertinent imaging results/findings within the past 24 hours.    Assessment/Plan:     * Denice  gangrene  - s/p multiple debridements  - wound vac in place  - ID consulted for antibiotic management: He was treated with rocephin, daptomycin, clindamycin. 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 Cazares  2/22- back to OR yesterday for skin grafting  3/2- ask surgery to eval for possible PEG tube placement   3/6- plan for possible surgery this next week    On mechanically assisted ventilation  - 12/14 intubated, tracheostomy 1/4    3/3- increased to 8 hours BID   3/5- continue with current plan. If he does well through the weekend we will push to continuous Monday  3/6- increase PSV/CPAP to 5 hours tid today  03/07/2022 increased weaning as tolerated    Hypertension  - continue diltiazem  - 3/02- bp is on lower side today while on HD   3/5- bp remains low normal    Fever  2/26 -Tmax 101.4 -- resp culture - with GNB - will start Zosyn today - follow cultures   BC x 2 NGTD   2/28 - resp culture with pseudomonas and klebsiella  treat with zosyn for 7 days   Blood culture from 2/25 also growing Pseudomonas. Sensitive to zosyn. Will need to continue zosyn for at least 10 days total    Type 2 diabetes mellitus without complication, without long-term current use of insulin  - continue ISS  - glucose stable/controlled      Acute blood loss anemia  Has required multiple transfusions this admission    Most recent Hgb is 7.8 - CBC in AM     Necrotizing fasciitis  S/p multiple surgeries                  Jose Urban MD  Pulmonology  [facility]

## 2022-03-07 NOTE — ASSESSMENT & PLAN NOTE
- 12/14 intubated, tracheostomy 1/4    3/3- increased to 8 hours BID   3/5- continue with current plan. If he does well through the weekend we will push to continuous Monday  3/6- increase PSV/CPAP to 5 hours tid today  03/07/2022 increased weaning as tolerated

## 2022-03-07 NOTE — SUBJECTIVE & OBJECTIVE
Interval History:  Patient without complaints    Objective:     Vital Signs (Most Recent):  Temp: 99 °F (37.2 °C) (03/06/22 2300)  Pulse: 102 (03/07/22 0420)  Resp: (!) 22 (03/07/22 0420)  BP: (!) 113/59 (03/07/22 0548)  SpO2: 100 % (03/07/22 0420)   Vital Signs (24h Range):  Temp:  [99 °F (37.2 °C)-99.6 °F (37.6 °C)] 99 °F (37.2 °C)  Pulse:  [] 102  Resp:  [11-25] 22  SpO2:  [94 %-100 %] 100 %  BP: ()/(48-70) 113/59     Weight: 78.1 kg (172 lb 2.9 oz)  Body mass index is 24.01 kg/m².      Intake/Output Summary (Last 24 hours) at 3/7/2022 0559  Last data filed at 3/6/2022 0735  Gross per 24 hour   Intake 1050 ml   Output 0 ml   Net 1050 ml       Physical Exam  Vitals reviewed.   Constitutional:       Appearance: Normal appearance.      Interventions: He is not intubated.  HENT:      Head: Normocephalic and atraumatic.      Nose: Nose normal.      Mouth/Throat:      Mouth: Mucous membranes are dry.      Pharynx: Oropharynx is clear.   Eyes:      Extraocular Movements: Extraocular movements intact.      Conjunctiva/sclera: Conjunctivae normal.      Pupils: Pupils are equal, round, and reactive to light.   Cardiovascular:      Rate and Rhythm: Normal rate.      Heart sounds: Normal heart sounds. No murmur heard.  Pulmonary:      Effort: Pulmonary effort is normal. He is not intubated.      Breath sounds: Normal breath sounds.   Abdominal:      General: Abdomen is flat. Bowel sounds are normal.      Palpations: Abdomen is soft.   Musculoskeletal:         General: Normal range of motion.      Cervical back: Normal range of motion and neck supple.      Right lower leg: No edema.      Left lower leg: No edema.   Skin:     General: Skin is warm and dry.      Capillary Refill: Capillary refill takes less than 2 seconds.   Neurological:      General: No focal deficit present.      Mental Status: He is alert and oriented to person, place, and time.   Psychiatric:         Mood and Affect: Mood normal.          Behavior: Behavior normal.       Vents:  Vent Mode: A/C (03/07/22 0500)  Set Rate: 14 BPM (03/07/22 0500)  Vt Set: 480 mL (03/07/22 0500)  Pressure Support: 12 cmH20 (03/07/22 0500)  PEEP/CPAP: 5 cmH20 (03/07/22 0500)  Oxygen Concentration (%): 35 (03/07/22 0420)  Peak Airway Pressure: 18 cmH2O (03/07/22 0500)  Plateau Pressure: 20 cmH20 (03/07/22 0500)  Total Ve: 8.9 mL (03/07/22 0500)  F/VT Ratio<105 (RSBI): (!) 55.07 (03/07/22 0006)    Lines/Drains/Airways       Peripherally Inserted Central Catheter Line  Duration             PICC Double Lumen 01/05/22 left basilic 61 days              Central Venous Catheter Line  Duration                  Hemodialysis Catheter 12/30/21 0900 right internal jugular 66 days              Drain  Duration                  NG/OG Tube [place] sump 18 Fr. Left nostril -- days         Colostomy 12/18/21 1030 Descending/sigmoid LUQ 78 days    Male External Urinary Catheter 02/21/22 Medium 14 days              Airway  Duration                  Surgical Airway 01/04/22 Shiley 62 days                    Significant Labs:    CBC/Anemia Profile:  No results for input(s): WBC, HGB, HCT, PLT, MCV, RDW, IRON, FERRITIN, RETIC, FOLATE, [id number], OCCULTBLOOD in the last 48 hours.     Chemistries:  No results for input(s): NA, K, CL, CO2, BUN, CREATININE, CALCIUM, ALBUMIN, PROT, BILITOT, ALKPHOS, ALT, AST, GLUCOSE, MG, PHOS in the last 48 hours.    All pertinent labs within the past 24 hours have been reviewed.    Significant Imaging:  I have reviewed all pertinent imaging results/findings within the past 24 hours.

## 2022-03-07 NOTE — PROGRESS NOTES
[facility]  Nephrology  Progress Note    Patient Name: Og Garcia  MRN: [id number]  Admission Date: 12/23/2021  Hospital Length of Stay: 73 days  Attending Provider: Cecil Abernathy DO   Primary Care Physician: Hector Arndt DNP, FNP-C  Principal Problem:Denice gangrene    Subjective:     HPI: The patient is known from the previous nephrology consult at [facility].  He is no at Specialty and continues to need dialysis support.      Interval History:  He remains on the ventilator.  Blood pressure is borderline low    Review of patient's allergies indicates:  No Known Allergies  Current Facility-Administered Medications   Medication Frequency    0.9%  NaCl infusion (for blood administration) Q24H PRN    0.9%  NaCl infusion PRN    acetaminophen tablet 500 mg Q6H PRN    albuterol nebulizer solution 2.5 mg Q4H PRN    bisacodyL EC tablet 10 mg Daily PRN    calcium gluconate 1 g in dextrose 5 % in water (D5W) 5 % 100 mL IVPB (MB+) Once    dextromethorphan-guaiFENesin  mg/5 ml liquid 10 mL Q6H PRN    dextrose 50% injection 12.5 g PRN    diltiaZEM tablet 60 mg Q6H    fentaNYL 50 mcg/hr 1 patch Q72H    glucagon (human recombinant) injection 1 mg PRN    guaiFENesin 100 mg/5 ml syrup 200 mg Q6H    heparin (porcine) injection 4,000 Units PRN    insulin aspart U-100 injection 1-10 Units Q6H    lorazepam injection 2 mg Q6H PRN    ondansetron injection 8 mg Q6H PRN    pantoprazole suspension 40 mg Daily    sevelamer carbonate pwpk 0.8 g TID WM    silver sulfADIAZINE 1% cream PRN    simethicone chewable tablet 80 mg TID PRN       Objective:     Vital Signs (Most Recent):  Temp: 99 °F (37.2 °C) (03/06/22 1500)  Pulse: 99 (03/06/22 1845)  Resp: (!) 22 (03/06/22 1845)  BP: (!) 106/54 (03/06/22 1845)  SpO2: 100 % (03/06/22 1845)  O2 Device (Oxygen Therapy): ventilator (03/06/22 1511)   Vital Signs (24h Range):  Temp:  [98.5 °F (36.9 °C)-99.4 °F (37.4 °C)] 99 °F (37.2 °C)  Pulse:   [] 99  Resp:  [11-26] 22  SpO2:  [94 %-100 %] 100 %  BP: ()/(48-73) 106/54     Weight: 78.1 kg (172 lb 2.9 oz) (03/06/22 0600)  Body mass index is 24.01 kg/m².  Body surface area is 1.98 meters squared.    I/O last 3 completed shifts:  In: 1400 [NG/GT:1400]  Out: 0     Physical Exam  Vitals reviewed.   Constitutional:       General: He is not in acute distress.     Appearance: He is ill-appearing.   HENT:      Head: Normocephalic and atraumatic.      Mouth/Throat:      Comments: trach  Eyes:      Pupils: Pupils are equal, round, and reactive to light.      Comments: right eye enucleation   Cardiovascular:      Rate and Rhythm: Regular rhythm.      Heart sounds: Normal heart sounds.   Pulmonary:      Effort: Pulmonary effort is normal. No respiratory distress.      Comments: Ventilated, resting comfortably.  Abdominal:      General: Bowel sounds are normal.      Palpations: Abdomen is soft.   Musculoskeletal:         General: Normal range of motion.      Cervical back: Neck supple.      Right lower leg: Edema present.      Left lower leg: Edema present.   Lymphadenopathy:      Cervical: No cervical adenopathy.   Skin:     General: Skin is warm and dry.   Neurological:      Cranial Nerves: No cranial nerve deficit.       Significant Labs:  BMP: No results for input(s): GLU, NA, K, CL, CO2, BUN, CREATININE, CALCIUM, MG in the last 168 hours.  CBC: No results for input(s): WBC, RBC, HGB, HCT, PLT, MCV, MCH, MCHC in the last 168 hours.     Significant Imaging:      Assessment/Plan:     * Denice gangrene  Continue wound care      ESRD (end stage renal disease)  Dialysis not required today     Necrotizing fasciitis  Continue wound care and antibiotics    On mechanically assisted ventilation  Wean as tolerated        Thank you for your consult.     Huber Mcnamara MD  Nephrology  [facility]

## 2022-03-07 NOTE — NURSING
2000-Lying in bed with HOB^. Resp even et unlabored. L nare SS infusing Vital AF @ 75 ml/hr w/o diff. # 8 LPC trach intact. On Cpap.12-5-35%. R SC HD cath intact. Fentanyl patch noted to L shoulder. Multiple dsg noted to mid to R abd, R thigh, bilat. Feet et sacral-all D/I.  Colostomy noted to L ABD-bag berth. Condom cath noted with scant amt of yellow urine. WB et SCD hoses noted to BLE. Open eye when named called. Weak  to R hand. No  noted to L hand noted.Safety measures on-going.    0000-Physical assessment complete. No acute changes noted @ this time.Still on Cpap. No resp. Distress noted @ this time.    0006-placed back on AC mode. Did 15 hrs of Cpap trial.    0412-Physical assessment complete. No acute changes noted @ present. All tubes lines intact. Safety measures on-going.    0710-Report given @ bedside to on-coming shift.

## 2022-03-08 NOTE — PROGRESS NOTES
[facility]   Wound Care  Progress Note    Patient Name: Og Garcia  MRN: [id number]  Admission Date: 12/23/2021  Attending Physician: Cecil Abernathy DO    Chief Complaint: Wounds to abdomen, sacral, left lateral lower leg, right anterior foot, right hand, left posterior heel, and right lateral and medial heel    Past Medical History:   Diagnosis Date    Hyperlipidemia     Hypertension         Subjective:     HPI:  Og Garcia is a 66 y.o. male with wounds to abdomen, sacral, left lateral lower leg, right anterior foot, right hand, Left posterior heel, and right lateral and medial foot. Skin graft on abdomen intact and healing well. Sacral wound is necrotic and has adherent yellow slough. Necrotic tissue noted to heel.      Review of Systems   Unable to perform ROS: Acuity of condition     Objective:     Vital Signs (Most Recent):  Temp: 98.7 °F (37.1 °C) (03/14/22 0800)  Pulse: 91 (03/14/22 0800)  Resp: (!) 21 (03/14/22 0800)  BP: 138/68 (03/14/22 0800)  SpO2: 100 % (03/14/22 0800) Vital Signs (24h Range):  Temp:  [98.7 °F (37.1 °C)-99.8 °F (37.7 °C)] 98.7 °F (37.1 °C)  Pulse:  [83-98] 91  Resp:  [15-30] 21  SpO2:  [96 %-100 %] 100 %  BP: (116-149)/(57-71) 138/68     Weight: 79.2 kg (174 lb 9.7 oz)  Body mass index is 24.35 kg/m².    Physical Exam  Vitals reviewed.   Constitutional:       Appearance: He is ill-appearing.   HENT:      Head: Normocephalic.   Cardiovascular:      Rate and Rhythm: Normal rate and regular rhythm.      Pulses: Normal pulses.      Heart sounds: Normal heart sounds.   Pulmonary:      Comments: Intubated  Skin:     Findings: Erythema present.      Comments: Wound, see wound assessment and pictures   Neurological:      Mental Status: He is alert. Mental status is at baseline.      Motor: Weakness present.         Assessment and Plan      Assessment:  1. Abdominal wound/Skin graft  2. Right thigh donor site  3. Sacral wound  4.  Lower extremity wounds     Plan:   1.  Abdomen-continue mepitel  2. Donor site - CDI, continue petroleum gauze in place.   3. Apply silvadene to all other wounds.            Signature:  HERSON Viveros  Wound Care    Date of encounter: 3/8/2022

## 2022-03-08 NOTE — PLAN OF CARE
Problem: Fluid and Electrolyte Imbalance (Acute Kidney Injury/Impairment)  Goal: Fluid and Electrolyte Balance  Outcome: Ongoing, Progressing     Problem: Oral Intake Inadequate (Acute Kidney Injury/Impairment)  Goal: Optimal Nutrition Intake  Outcome: Ongoing, Progressing     Problem: Renal Function Impairment (Acute Kidney Injury/Impairment)  Goal: Effective Renal Function  Outcome: Ongoing, Progressing

## 2022-03-08 NOTE — PROGRESS NOTES
[facility]  Pulmonology  Progress Note    Patient Name: Og Garcia  MRN: [id number]  Admission Date: 12/23/2021  Hospital Length of Stay: 75 days  Code Status: Prior  Attending Provider: Cecil Abernathy DO  Primary Care Provider: Hector Arndt DNP, FNP-C   Principal Problem: Denice gangrene    Subjective:     Interval History: No acute events overnight. Currently hemodynamically stable. Oxygenating adequately on the ventilator. Currently afebrile. Doing well with breathing trials.     Objective:     Vital Signs (Most Recent):  Temp: 98.6 °F (37 °C) (03/08/22 1100)  Pulse: 95 (03/08/22 1100)  Resp: 20 (03/08/22 1100)  BP: 121/60 (03/08/22 1100)  SpO2: 100 % (03/08/22 1100) Vital Signs (24h Range):  Temp:  [97.7 °F (36.5 °C)-99.4 °F (37.4 °C)] 98.6 °F (37 °C)  Pulse:  [] 95  Resp:  [14-31] 20  SpO2:  [90 %-100 %] 100 %  BP: ()/(44-71) 121/60     Weight: 77.7 kg (171 lb 4.8 oz)  Body mass index is 23.89 kg/m².      Intake/Output Summary (Last 24 hours) at 3/8/2022 1201  Last data filed at 3/8/2022 0600  Gross per 24 hour   Intake 2500 ml   Output 2110 ml   Net 390 ml         Physical Exam  Vitals reviewed.   Constitutional:       General: He is not in acute distress.     Appearance: He is not ill-appearing.      Comments: Chronically ill appearing   HENT:      Head: Normocephalic and atraumatic.      Right Ear: External ear normal.      Left Ear: External ear normal.      Nose: Nose normal.      Mouth/Throat:      Mouth: Mucous membranes are moist.      Comments: trach  Eyes:      Conjunctiva/sclera: Conjunctivae normal.      Pupils: Pupils are equal, round, and reactive to light.   Cardiovascular:      Rate and Rhythm: Regular rhythm. Tachycardia present.      Pulses: Normal pulses.      Heart sounds: Normal heart sounds.   Pulmonary:      Effort: No respiratory distress.      Comments: clear breath sounds anteriorly  Abdominal:      Palpations: Abdomen is soft.    Musculoskeletal:         General: Normal range of motion.      Cervical back: Neck supple.   Lymphadenopathy:      Cervical: No cervical adenopathy.   Skin:     General: Skin is warm and dry.      Coloration: Skin is not pale.   Neurological:      Mental Status: Mental status is at baseline.      Comments: Moves spontaneously, tracks with good eye   Psychiatric:         Behavior: Behavior normal.       Vents:  Vent Mode: A/C (03/08/22 0700)  Set Rate: 14 BPM (03/08/22 0700)  Vt Set: 480 mL (03/08/22 0700)  Pressure Support: 12 cmH20 (03/08/22 0326)  PEEP/CPAP: 5 cmH20 (03/08/22 0700)  Oxygen Concentration (%): 35 (03/08/22 0757)  Peak Airway Pressure: 19 cmH2O (03/08/22 0700)  Plateau Pressure: 20 cmH20 (03/07/22 0500)  Total Ve: 8.8 mL (03/08/22 0700)  F/VT Ratio<105 (RSBI): (!) 56.56 (03/08/22 0700)    Lines/Drains/Airways       Peripherally Inserted Central Catheter Line  Duration             PICC Double Lumen 01/05/22 left basilic 62 days              Central Venous Catheter Line  Duration                  Hemodialysis Catheter 12/30/21 0900 right internal jugular 68 days              Drain  Duration                  NG/OG Tube [place] sump 18 Fr. Left nostril -- days         Colostomy 12/18/21 1030 Descending/sigmoid LUQ 80 days    Male External Urinary Catheter 02/21/22 Medium 15 days              Airway  Duration                  Surgical Airway 01/04/22 Shiley 63 days                    Significant Labs:    CBC/Anemia Profile:  Recent Labs   Lab 03/07/22  0658 03/08/22  0604   WBC 20.60* 18.81*   HGB 6.4* 8.5*   HCT 19.4* 26.6*   * 413*   MCV 89.4 89.9   RDW 14.6* 16.5*          Chemistries:  Recent Labs   Lab 03/07/22  0658 03/08/22  0604   * 133*   K 6.2* 5.1   CL 91* 95*   CO2 21 23   * 60*   CREATININE 5.30* 3.21*   CALCIUM 8.3* 8.6         All pertinent labs within the past 24 hours have been reviewed.    Significant Imaging:  I have reviewed all pertinent imaging results/findings  within the past 24 hours.    Assessment/Plan:     * Denice gangrene  - s/p multiple debridements  - wound vac in place  - ID consulted for antibiotic management: He was treated with rocephin, daptomycin, clindamycin. 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 Cazares  2/22- back to OR yesterday for skin grafting  3/2- ask surgery to eval for possible PEG tube placement   3/6- plan for possible surgery this next week    ESRD (end stage renal disease)  Started on HD 12/30   Continue with HD per nephrology  TTS     Type 2 diabetes mellitus without complication, without long-term current use of insulin  - continue ISS  - glucose stable/controlled      Acute blood loss anemia  Has required multiple transfusions this admission    Most recent Hgb is 8.5 -    On mechanically assisted ventilation  - 12/14 intubated, tracheostomy 1/4    3/3- increased to 8 hours BID   3/5- continue with current plan. If he does well through the weekend we will push to continuous Monday  3/6- increase PSV/CPAP to 5 hours tid today  03/07/2022 increased weaning as tolerated    Hypertension  - continue diltiazem  - 3/02- bp is on lower side today while on HD   3/8- bp is ok                 Cecil Abernathy DO  Pulmonology  [facility] [place] [facility]\""

## 2022-03-08 NOTE — PLAN OF CARE
Problem: Adult Inpatient Plan of Care  Goal: Plan of Care Review  Outcome: Ongoing, Progressing  Goal: Patient-Specific Goal (Individualized)  Outcome: Ongoing, Progressing  Goal: Absence of Hospital-Acquired Illness or Injury  Outcome: Ongoing, Progressing  Goal: Optimal Comfort and Wellbeing  Outcome: Ongoing, Progressing  Goal: Readiness for Transition of Care  Outcome: Ongoing, Progressing     Problem: Adjustment to Illness (Sepsis/Septic Shock)  Goal: Optimal Coping  Outcome: Ongoing, Progressing     Problem: Adjustment to Illness (Sepsis/Septic Shock)  Goal: Optimal Coping  Outcome: Ongoing, Progressing     Problem: Bleeding (Sepsis/Septic Shock)  Goal: Absence of Bleeding  Outcome: Ongoing, Progressing     Problem: Glycemic Control Impaired (Sepsis/Septic Shock)  Goal: Blood Glucose Level Within Desired Range  Outcome: Ongoing, Progressing     Problem: Infection Progression (Sepsis/Septic Shock)  Goal: Absence of Infection Signs and Symptoms  Outcome: Ongoing, Progressing     Problem: Nutrition Impaired (Sepsis/Septic Shock)  Goal: Optimal Nutrition Intake  Outcome: Ongoing, Progressing     Problem: Fluid and Electrolyte Imbalance (Acute Kidney Injury/Impairment)  Goal: Fluid and Electrolyte Balance  Outcome: Ongoing, Progressing     Problem: Oral Intake Inadequate (Acute Kidney Injury/Impairment)  Goal: Optimal Nutrition Intake  Outcome: Ongoing, Progressing     Problem: Renal Function Impairment (Acute Kidney Injury/Impairment)  Goal: Effective Renal Function  Outcome: Ongoing, Progressing     Problem: Infection  Goal: Absence of Infection Signs and Symptoms  Outcome: Ongoing, Progressing     Problem: Fall Injury Risk  Goal: Absence of Fall and Fall-Related Injury  Outcome: Ongoing, Progressing     Problem: Communication Impairment (Mechanical Ventilation, Invasive)  Goal: Effective Communication  Outcome: Ongoing, Progressing     Problem: Device-Related Complication Risk (Mechanical Ventilation,  Invasive)  Goal: Optimal Device Function  Outcome: Ongoing, Progressing     Problem: Inability to Wean (Mechanical Ventilation, Invasive)  Goal: Mechanical Ventilation Liberation  Outcome: Ongoing, Progressing     Problem: Nutrition Impairment (Mechanical Ventilation, Invasive)  Goal: Optimal Nutrition Delivery  Outcome: Ongoing, Progressing     Problem: Skin and Tissue Injury (Mechanical Ventilation, Invasive)  Goal: Absence of Device-Related Skin and Tissue Injury  Outcome: Ongoing, Progressing     Problem: Ventilator-Induced Lung Injury (Mechanical Ventilation, Invasive)  Goal: Absence of Ventilator-Induced Lung Injury  Outcome: Ongoing, Progressing     Problem: Communication Impairment (Artificial Airway)  Goal: Effective Communication  Outcome: Ongoing, Progressing     Problem: Device-Related Complication Risk (Artificial Airway)  Goal: Optimal Device Function  Outcome: Ongoing, Progressing     Problem: Skin and Tissue Injury (Artificial Airway)  Goal: Absence of Device-Related Skin or Tissue Injury  Outcome: Ongoing, Progressing     Problem: Noninvasive Ventilation Acute  Goal: Effective Unassisted Ventilation and Oxygenation  Outcome: Ongoing, Progressing     Problem: Skin Injury Risk Increased  Goal: Skin Health and Integrity  Outcome: Ongoing, Progressing     Problem: Device-Related Complication Risk (Hemodialysis)  Goal: Safe, Effective Therapy Delivery  Outcome: Ongoing, Progressing     Problem: Hemodynamic Instability (Hemodialysis)  Goal: Effective Tissue Perfusion  Outcome: Ongoing, Progressing     Problem: Infection (Hemodialysis)  Goal: Absence of Infection Signs and Symptoms  Outcome: Ongoing, Progressing     Problem: Diabetes Comorbidity  Goal: Blood Glucose Level Within Targeted Range  Outcome: Ongoing, Progressing     Problem: Impaired Wound Healing  Goal: Optimal Wound Healing  Outcome: Ongoing, Progressing     Problem: Gas Exchange Impaired  Goal: Optimal Gas Exchange  Outcome: Ongoing,  Progressing

## 2022-03-08 NOTE — NURSING
PRBC's increased at this time, pt noted without adverse reaction, no s/s of resp. Distress or discomfort, will continue to monitor.

## 2022-03-08 NOTE — PROGRESS NOTES
[facility]  Nephrology  Progress Note    Patient Name: Og Garcia  MRN: [id number]  Admission Date: 12/23/2021  Hospital Length of Stay: 74 days  Attending Provider: Cecil Abernathy DO   Primary Care Physician: Hector Arndt DNP, FNP-C  Principal Problem:Denice gangrene    Subjective:     HPI: The patient is known from the previous nephrology consult at [facility].  He is no at Specialty and continues to need dialysis support.      Interval History: The patient did several hours of cpap.  He is resting comfortably.    Review of patient's allergies indicates:  No Known Allergies  Current Facility-Administered Medications   Medication Frequency    0.9%  NaCl infusion (for blood administration) Q24H PRN    0.9%  NaCl infusion PRN    acetaminophen tablet 500 mg Q6H PRN    albuterol nebulizer solution 2.5 mg Q4H PRN    bisacodyL EC tablet 10 mg Daily PRN    calcium gluconate 1 g in dextrose 5 % in water (D5W) 5 % 100 mL IVPB (MB+) Once    dextromethorphan-guaiFENesin  mg/5 ml liquid 10 mL Q6H PRN    dextrose 50% injection 12.5 g PRN    diltiaZEM tablet 60 mg Q6H    fentaNYL 50 mcg/hr 1 patch Q72H    glucagon (human recombinant) injection 1 mg PRN    guaiFENesin 100 mg/5 ml syrup 200 mg Q6H    heparin (porcine) injection 4,000 Units PRN    insulin aspart U-100 injection 1-10 Units Q6H    lorazepam injection 2 mg Q6H PRN    ondansetron injection 8 mg Q6H PRN    pantoprazole suspension 40 mg Daily    sevelamer carbonate pwpk 0.8 g TID WM    silver sulfADIAZINE 1% cream PRN    simethicone chewable tablet 80 mg TID PRN    sodium chloride 0.9% infusion        Objective:     Vital Signs (Most Recent):  Temp: 98.7 °F (37.1 °C) (03/07/22 1545)  Pulse: 110 (03/07/22 1800)  Resp: (!) 24 (03/07/22 1800)  BP: (!) 106/53 (03/07/22 1800)  SpO2: 100 % (03/07/22 1800)  O2 Device (Oxygen Therapy): ventilator (03/07/22 1615)   Vital Signs (24h Range):  Temp:  [97.7 °F (36.5  °C)-99.4 °F (37.4 °C)] 98.7 °F (37.1 °C)  Pulse:  [] 110  Resp:  [14-32] 24  SpO2:  [90 %-100 %] 100 %  BP: ()/(49-72) 106/53     Weight: 83.3 kg (183 lb 10.3 oz) (03/07/22 1016)  Body mass index is 25.61 kg/m².  Body surface area is 2.04 meters squared.    I/O last 3 completed shifts:  In: 1950 [NG/GT:1950]  Out: 1960 [Urine:450; Other:1035; Stool:475]    Physical Exam  Vitals reviewed.   HENT:      Head: Atraumatic.   Cardiovascular:      Rate and Rhythm: Regular rhythm.   Pulmonary:      Effort: Pulmonary effort is normal.      Comments: Occasional wheeze  Abdominal:      General: Abdomen is flat.   Neurological:      Mental Status: He is alert.       Significant Labs:  BMP:   Recent Labs   Lab 03/07/22  0658   *   *   K 6.2*   CL 91*   CO2 21   *   CREATININE 5.30*   CALCIUM 8.3*     CBC:   Recent Labs   Lab 03/07/22  0658   WBC 20.60*   RBC 2.17*   HGB 6.4*   HCT 19.4*   *   MCV 89.4   MCH 29.5   MCHC 33.0        Significant Imaging:  Labs: Reviewed    Assessment/Plan:     ESRD (end stage renal disease)  Dialysis not required today   3/7/2022 Continue with scheduled hemodialysis on MWF        Thank you for your consult. I will follow-up with patient. Please contact us if you have any additional questions.    Edwin Pryor Jr, MD  Nephrology  [facility]

## 2022-03-08 NOTE — SUBJECTIVE & OBJECTIVE
Interval History: No acute events overnight. Currently hemodynamically stable. Oxygenating adequately on the ventilator. Currently afebrile. Doing well with breathing trials.     Objective:     Vital Signs (Most Recent):  Temp: 98.6 °F (37 °C) (03/08/22 1100)  Pulse: 95 (03/08/22 1100)  Resp: 20 (03/08/22 1100)  BP: 121/60 (03/08/22 1100)  SpO2: 100 % (03/08/22 1100) Vital Signs (24h Range):  Temp:  [97.7 °F (36.5 °C)-99.4 °F (37.4 °C)] 98.6 °F (37 °C)  Pulse:  [] 95  Resp:  [14-31] 20  SpO2:  [90 %-100 %] 100 %  BP: ()/(44-71) 121/60     Weight: 77.7 kg (171 lb 4.8 oz)  Body mass index is 23.89 kg/m².      Intake/Output Summary (Last 24 hours) at 3/8/2022 1201  Last data filed at 3/8/2022 0600  Gross per 24 hour   Intake 2500 ml   Output 2110 ml   Net 390 ml         Physical Exam  Vitals reviewed.   Constitutional:       General: He is not in acute distress.     Appearance: He is not ill-appearing.      Comments: Chronically ill appearing   HENT:      Head: Normocephalic and atraumatic.      Right Ear: External ear normal.      Left Ear: External ear normal.      Nose: Nose normal.      Mouth/Throat:      Mouth: Mucous membranes are moist.      Comments: trach  Eyes:      Conjunctiva/sclera: Conjunctivae normal.      Pupils: Pupils are equal, round, and reactive to light.   Cardiovascular:      Rate and Rhythm: Regular rhythm. Tachycardia present.      Pulses: Normal pulses.      Heart sounds: Normal heart sounds.   Pulmonary:      Effort: No respiratory distress.      Comments: clear breath sounds anteriorly  Abdominal:      Palpations: Abdomen is soft.   Musculoskeletal:         General: Normal range of motion.      Cervical back: Neck supple.   Lymphadenopathy:      Cervical: No cervical adenopathy.   Skin:     General: Skin is warm and dry.      Coloration: Skin is not pale.   Neurological:      Mental Status: Mental status is at baseline.      Comments: Moves spontaneously, tracks with good eye    Psychiatric:         Behavior: Behavior normal.       Vents:  Vent Mode: A/C (03/08/22 0700)  Set Rate: 14 BPM (03/08/22 0700)  Vt Set: 480 mL (03/08/22 0700)  Pressure Support: 12 cmH20 (03/08/22 0326)  PEEP/CPAP: 5 cmH20 (03/08/22 0700)  Oxygen Concentration (%): 35 (03/08/22 0757)  Peak Airway Pressure: 19 cmH2O (03/08/22 0700)  Plateau Pressure: 20 cmH20 (03/07/22 0500)  Total Ve: 8.8 mL (03/08/22 0700)  F/VT Ratio<105 (RSBI): (!) 56.56 (03/08/22 0700)    Lines/Drains/Airways       Peripherally Inserted Central Catheter Line  Duration             PICC Double Lumen 01/05/22 left basilic 62 days              Central Venous Catheter Line  Duration                  Hemodialysis Catheter 12/30/21 0900 right internal jugular 68 days              Drain  Duration                  NG/OG Tube [place] sump 18 Fr. Left nostril -- days         Colostomy 12/18/21 1030 Descending/sigmoid LUQ 80 days    Male External Urinary Catheter 02/21/22 Medium 15 days              Airway  Duration                  Surgical Airway 01/04/22 Shiley 63 days                    Significant Labs:    CBC/Anemia Profile:  Recent Labs   Lab 03/07/22  0658 03/08/22  0604   WBC 20.60* 18.81*   HGB 6.4* 8.5*   HCT 19.4* 26.6*   * 413*   MCV 89.4 89.9   RDW 14.6* 16.5*          Chemistries:  Recent Labs   Lab 03/07/22  0658 03/08/22  0604   * 133*   K 6.2* 5.1   CL 91* 95*   CO2 21 23   * 60*   CREATININE 5.30* 3.21*   CALCIUM 8.3* 8.6         All pertinent labs within the past 24 hours have been reviewed.    Significant Imaging:  I have reviewed all pertinent imaging results/findings within the past 24 hours.

## 2022-03-08 NOTE — NURSING
1 hour post check r/t PRBC's, no adverse reactions noted, no resp. Distress or signs of discomfort. Vital signs stable, will continue to monitor.

## 2022-03-08 NOTE — SUBJECTIVE & OBJECTIVE
Interval History: The patient did several hours of cpap.  He is resting comfortably.    Review of patient's allergies indicates:  No Known Allergies  Current Facility-Administered Medications   Medication Frequency    0.9%  NaCl infusion (for blood administration) Q24H PRN    0.9%  NaCl infusion PRN    acetaminophen tablet 500 mg Q6H PRN    albuterol nebulizer solution 2.5 mg Q4H PRN    bisacodyL EC tablet 10 mg Daily PRN    calcium gluconate 1 g in dextrose 5 % in water (D5W) 5 % 100 mL IVPB (MB+) Once    dextromethorphan-guaiFENesin  mg/5 ml liquid 10 mL Q6H PRN    dextrose 50% injection 12.5 g PRN    diltiaZEM tablet 60 mg Q6H    fentaNYL 50 mcg/hr 1 patch Q72H    glucagon (human recombinant) injection 1 mg PRN    guaiFENesin 100 mg/5 ml syrup 200 mg Q6H    heparin (porcine) injection 4,000 Units PRN    insulin aspart U-100 injection 1-10 Units Q6H    lorazepam injection 2 mg Q6H PRN    ondansetron injection 8 mg Q6H PRN    pantoprazole suspension 40 mg Daily    sevelamer carbonate pwpk 0.8 g TID WM    silver sulfADIAZINE 1% cream PRN    simethicone chewable tablet 80 mg TID PRN    sodium chloride 0.9% infusion        Objective:     Vital Signs (Most Recent):  Temp: 98.7 °F (37.1 °C) (03/07/22 1545)  Pulse: 110 (03/07/22 1800)  Resp: (!) 24 (03/07/22 1800)  BP: (!) 106/53 (03/07/22 1800)  SpO2: 100 % (03/07/22 1800)  O2 Device (Oxygen Therapy): ventilator (03/07/22 1615)   Vital Signs (24h Range):  Temp:  [97.7 °F (36.5 °C)-99.4 °F (37.4 °C)] 98.7 °F (37.1 °C)  Pulse:  [] 110  Resp:  [14-32] 24  SpO2:  [90 %-100 %] 100 %  BP: ()/(49-72) 106/53     Weight: 83.3 kg (183 lb 10.3 oz) (03/07/22 1016)  Body mass index is 25.61 kg/m².  Body surface area is 2.04 meters squared.    I/O last 3 completed shifts:  In: 1950 [NG/GT:1950]  Out: 1960 [Urine:450; Other:1035; Stool:475]    Physical Exam  Vitals reviewed.   HENT:      Head: Atraumatic.   Cardiovascular:      Rate and Rhythm: Regular rhythm.    Pulmonary:      Effort: Pulmonary effort is normal.      Comments: Occasional wheeze  Abdominal:      General: Abdomen is flat.   Neurological:      Mental Status: He is alert.       Significant Labs:  BMP:   Recent Labs   Lab 03/07/22  0658   *   *   K 6.2*   CL 91*   CO2 21   *   CREATININE 5.30*   CALCIUM 8.3*     CBC:   Recent Labs   Lab 03/07/22  0658   WBC 20.60*   RBC 2.17*   HGB 6.4*   HCT 19.4*   *   MCV 89.4   MCH 29.5   MCHC 33.0        Significant Imaging:  Labs: Reviewed

## 2022-03-09 NOTE — PROGRESS NOTES
[facility]  Pulmonology  Progress Note    Patient Name: Og Garcia  MRN: [id number]  Admission Date: 12/23/2021  Hospital Length of Stay: 76 days  Code Status: Prior  Attending Provider: Cecil Abernathy DO  Primary Care Provider: Hector Arndt DNP, FNP-C   Principal Problem: Denice gangrene    Subjective:     Interval History: No acute events overnight. Currently hemodynamically stable. Oxygenating adequately on the ventilator. Currently afebrile. Doing well with breathing trials.     Objective:     Vital Signs (Most Recent):  Temp: 97.6 °F (36.4 °C) (03/09/22 0915)  Pulse: 93 (03/09/22 0700)  Resp: 16 (03/09/22 0700)  BP: (!) 146/59 (03/09/22 0700)  SpO2: 100 % (03/09/22 0700) Vital Signs (24h Range):  Temp:  [97.6 °F (36.4 °C)-98.9 °F (37.2 °C)] 97.6 °F (36.4 °C)  Pulse:  [] 93  Resp:  [11-26] 16  SpO2:  [99 %-100 %] 100 %  BP: (113-162)/(53-78) 146/59     Weight: 77.6 kg (171 lb 1.2 oz)  Body mass index is 23.86 kg/m².      Intake/Output Summary (Last 24 hours) at 3/9/2022 1120  Last data filed at 3/9/2022 0400  Gross per 24 hour   Intake 1050 ml   Output 700 ml   Net 350 ml         Physical Exam  Vitals reviewed.   Constitutional:       General: He is not in acute distress.     Appearance: He is not ill-appearing.      Comments: Chronically ill appearing   HENT:      Head: Normocephalic and atraumatic.      Right Ear: External ear normal.      Left Ear: External ear normal.      Nose: Nose normal.      Mouth/Throat:      Mouth: Mucous membranes are moist.      Comments: trach  Eyes:      Conjunctiva/sclera: Conjunctivae normal.      Pupils: Pupils are equal, round, and reactive to light.   Cardiovascular:      Rate and Rhythm: Regular rhythm. Tachycardia present.      Pulses: Normal pulses.      Heart sounds: Normal heart sounds.   Pulmonary:      Effort: No respiratory distress.      Comments: clear breath sounds anteriorly  Abdominal:      Palpations: Abdomen is  soft.   Musculoskeletal:         General: Normal range of motion.      Cervical back: Neck supple.   Lymphadenopathy:      Cervical: No cervical adenopathy.   Skin:     General: Skin is warm and dry.      Coloration: Skin is not pale.   Neurological:      Mental Status: Mental status is at baseline.      Comments: Moves spontaneously, tracks with good eye   Psychiatric:         Behavior: Behavior normal.       Vents:  Vent Mode: PS/CPAP (03/09/22 0455)  Set Rate: 0 BPM (03/09/22 0455)  Vt Set: 0 mL (03/09/22 0455)  Pressure Support: 12 cmH20 (03/09/22 0455)  PEEP/CPAP: 5 cmH20 (03/09/22 0455)  Oxygen Concentration (%): 35 (03/09/22 0745)  Peak Airway Pressure: 18 cmH2O (03/09/22 0455)  Plateau Pressure: 20 cmH20 (03/07/22 0500)  Total Ve: 4.9 mL (03/09/22 0455)  F/VT Ratio<105 (RSBI): (!) 32.12 (03/08/22 0945)    Lines/Drains/Airways       Peripherally Inserted Central Catheter Line  Duration             PICC Double Lumen 01/05/22 left basilic 63 days              Central Venous Catheter Line  Duration                  Hemodialysis Catheter 12/30/21 0900 right internal jugular 69 days              Drain  Duration                  NG/OG Tube [place] sump 18 Fr. Left nostril -- days         Colostomy 12/18/21 1030 Descending/sigmoid LUQ 81 days    Male External Urinary Catheter 02/21/22 Medium 16 days              Airway  Duration                  Surgical Airway 01/04/22 Shiley 64 days                    Significant Labs:    CBC/Anemia Profile:  Recent Labs   Lab 03/08/22  0604 03/09/22  0634   WBC 18.81* 19.67*   HGB 8.5* 8.0*   HCT 26.6* 25.0*   * 398   MCV 89.9 89.6   RDW 16.5* 15.6*          Chemistries:  Recent Labs   Lab 03/08/22  0604 03/09/22  0634   * 131*   K 5.1 5.3*   CL 95* 94*   CO2 23 23   BUN 60* 76*   CREATININE 3.21* 4.15*   CALCIUM 8.6 8.8         All pertinent labs within the past 24 hours have been reviewed.    Significant Imaging:  I have reviewed all pertinent imaging  results/findings within the past 24 hours.    Assessment/Plan:     * Denice gangrene  - s/p multiple debridements  - wound vac in place  - ID consulted for antibiotic management: He was treated with rocephin, daptomycin, clindamycin. 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 Cazares  2/22- back to OR yesterday for skin grafting  3/2- ask surgery to eval for possible PEG tube placement   3/6- plan for possible surgery this next week    Fever  2/26 -Tmax 101.4 -- resp culture - with GNB - will start Zosyn today - follow cultures   BC x 2 NGTD   2/28 - resp culture with pseudomonas and klebsiella  treat with zosyn for 7 days   Blood culture from 2/25 also growing Pseudomonas. Sensitive to zosyn. Will need to continue zosyn for at least 10 days total  3/9 He has completed antibiotics    ESRD (end stage renal disease)  Started on HD 12/30   Continue with HD per nephrology  TTS     Type 2 diabetes mellitus without complication, without long-term current use of insulin  - continue ISS  - glucose stable/controlled      Acute blood loss anemia  Has required multiple transfusions this admission    Most recent Hgb is 8.0 -    On mechanically assisted ventilation  - 12/14 intubated, tracheostomy 1/4    3/3- increased to 8 hours BID   3/5- continue with current plan. If he does well through the weekend we will push to continuous Monday  3/6- increase PSV/CPAP to 5 hours tid today  03/07/2022 increased weaning as tolerated  3/9-will increase trials again today    Hypertension  - continue diltiazem  - 3/02- bp is on lower side today while on HD   3/8- bp is ok                 Cecil Abernathy, DO  Pulmonology  [facility]

## 2022-03-09 NOTE — PLAN OF CARE
Released to S Sami HARRIS 119/51, 94, Vent settings A/C , FIO2 35, rate 14, Peep 5. Released to TRAV for long term rehab.

## 2022-03-09 NOTE — OP NOTE
[facility] - Periop Services  Surgery Department  Operative Note    SUMMARY     Date of Procedure: 3/9/2022     Procedure: Procedure(s) (LRB):  EGD, WITH PEG TUBE INSERTION (N/A)     Surgeon(s) and Role:     * Harish Cazares MD - Primary    Assisting Surgeon: None    Pre-Operative Diagnosis: Necrotizing fasciitis [M72.6]    Post-Operative Diagnosis: Post-Op Diagnosis Codes:     * Necrotizing fasciitis [M72.6]    Anesthesia: General    Procedures Performed:  Percutaneous endoscopic gastrostomy    Significant Findings of the Procedure:     Procedure in Detail:  After informed consent was obtained patient brought to the OR prepped and draped in the usual sterile fashion. Started by easily advancing the endoscope down the esophagus into the stomach. Stomach was insufflated. Good digital palpation was appreciated and we also had good transillumination. We then made a small incision in the patient's left upper quadrant and place her needle through. We then placed the guidewire through the needle and snared it out the mouth. We then placed a PEG and pulled it down into the stomach. R PEG tube was at 2.5 cm the skin. We then placed endoscope back into the stomach is found that it was in a good location. We desufflated the stomach and secured the PEG tube in place. Patient tolerated the procedure well.        Complications: No    Estimated Blood Loss (EBL): * No values recorded between 3/9/2022  2:22 PM and 3/9/2022  2:44 PM *           Implants: * No implants in log *    Specimens:   Specimen (24h ago, onward)            None                  Condition: Good    Disposition: PACU - hemodynamically stable.    Attestation: I was present and scrubbed for the entire procedure.

## 2022-03-09 NOTE — ASSESSMENT & PLAN NOTE
- 12/14 intubated, tracheostomy 1/4    3/3- increased to 8 hours BID   3/5- continue with current plan. If he does well through the weekend we will push to continuous Monday  3/6- increase PSV/CPAP to 5 hours tid today  03/07/2022 increased weaning as tolerated  3/9-will increase trials again today

## 2022-03-09 NOTE — PROGRESS NOTES
Consult for possible PEG tube placement for feeding access.  Patient skin graft is looking great and lower in the abdomen.  Left upper quadrant area looks free from adhesions and is a possible candidate for and gastrostomy tube.  Risks and benefits explained to the family over the phone patient go to OR today for a PEG tube versus open gastrostomy.  Risks and benefits explained including risk of bleeding, infection, open operation, injury to surrounding organs, migration of the PEG tube or if it comes out to early open procedure for E insertion as necessary.  All questions were answered

## 2022-03-09 NOTE — ASSESSMENT & PLAN NOTE
Dialysis not required today   3/7/2022 Continue with scheduled hemodialysis on MWF  3/8/2022 Dialysis session is going well.

## 2022-03-09 NOTE — SUBJECTIVE & OBJECTIVE
Interval History: No acute events overnight. Currently hemodynamically stable. Oxygenating adequately on the ventilator. Currently afebrile. Doing well with breathing trials.     Objective:     Vital Signs (Most Recent):  Temp: 97.6 °F (36.4 °C) (03/09/22 0915)  Pulse: 93 (03/09/22 0700)  Resp: 16 (03/09/22 0700)  BP: (!) 146/59 (03/09/22 0700)  SpO2: 100 % (03/09/22 0700) Vital Signs (24h Range):  Temp:  [97.6 °F (36.4 °C)-98.9 °F (37.2 °C)] 97.6 °F (36.4 °C)  Pulse:  [] 93  Resp:  [11-26] 16  SpO2:  [99 %-100 %] 100 %  BP: (113-162)/(53-78) 146/59     Weight: 77.6 kg (171 lb 1.2 oz)  Body mass index is 23.86 kg/m².      Intake/Output Summary (Last 24 hours) at 3/9/2022 1120  Last data filed at 3/9/2022 0400  Gross per 24 hour   Intake 1050 ml   Output 700 ml   Net 350 ml         Physical Exam  Vitals reviewed.   Constitutional:       General: He is not in acute distress.     Appearance: He is not ill-appearing.      Comments: Chronically ill appearing   HENT:      Head: Normocephalic and atraumatic.      Right Ear: External ear normal.      Left Ear: External ear normal.      Nose: Nose normal.      Mouth/Throat:      Mouth: Mucous membranes are moist.      Comments: trach  Eyes:      Conjunctiva/sclera: Conjunctivae normal.      Pupils: Pupils are equal, round, and reactive to light.   Cardiovascular:      Rate and Rhythm: Regular rhythm. Tachycardia present.      Pulses: Normal pulses.      Heart sounds: Normal heart sounds.   Pulmonary:      Effort: No respiratory distress.      Comments: clear breath sounds anteriorly  Abdominal:      Palpations: Abdomen is soft.   Musculoskeletal:         General: Normal range of motion.      Cervical back: Neck supple.   Lymphadenopathy:      Cervical: No cervical adenopathy.   Skin:     General: Skin is warm and dry.      Coloration: Skin is not pale.   Neurological:      Mental Status: Mental status is at baseline.      Comments: Moves spontaneously, tracks with good  eye   Psychiatric:         Behavior: Behavior normal.       Vents:  Vent Mode: PS/CPAP (03/09/22 0455)  Set Rate: 0 BPM (03/09/22 0455)  Vt Set: 0 mL (03/09/22 0455)  Pressure Support: 12 cmH20 (03/09/22 0455)  PEEP/CPAP: 5 cmH20 (03/09/22 0455)  Oxygen Concentration (%): 35 (03/09/22 0745)  Peak Airway Pressure: 18 cmH2O (03/09/22 0455)  Plateau Pressure: 20 cmH20 (03/07/22 0500)  Total Ve: 4.9 mL (03/09/22 0455)  F/VT Ratio<105 (RSBI): (!) 32.12 (03/08/22 0945)    Lines/Drains/Airways       Peripherally Inserted Central Catheter Line  Duration             PICC Double Lumen 01/05/22 left basilic 63 days              Central Venous Catheter Line  Duration                  Hemodialysis Catheter 12/30/21 0900 right internal jugular 69 days              Drain  Duration                  NG/OG Tube [place] sump 18 Fr. Left nostril -- days         Colostomy 12/18/21 1030 Descending/sigmoid LUQ 81 days    Male External Urinary Catheter 02/21/22 Medium 16 days              Airway  Duration                  Surgical Airway 01/04/22 Shiley 64 days                    Significant Labs:    CBC/Anemia Profile:  Recent Labs   Lab 03/08/22  0604 03/09/22  0634   WBC 18.81* 19.67*   HGB 8.5* 8.0*   HCT 26.6* 25.0*   * 398   MCV 89.9 89.6   RDW 16.5* 15.6*          Chemistries:  Recent Labs   Lab 03/08/22  0604 03/09/22  0634   * 131*   K 5.1 5.3*   CL 95* 94*   CO2 23 23   BUN 60* 76*   CREATININE 3.21* 4.15*   CALCIUM 8.6 8.8         All pertinent labs within the past 24 hours have been reviewed.    Significant Imaging:  I have reviewed all pertinent imaging results/findings within the past 24 hours.

## 2022-03-09 NOTE — PROGRESS NOTES
Released to NANCY Gallardo RN post PEG insertion. For long term rehab. 119/51, 94, 97%. Vent settings A/C tv 480, FIO2 35%, rate 14, peep 5.

## 2022-03-09 NOTE — TRANSFER OF CARE
Anesthesia Transfer of Care Note    Patient: [facility]    Procedure(s) Performed: Procedure(s) (LRB):  EGD, WITH PEG TUBE INSERTION (N/A)    Patient location: ICU    Anesthesia Type: general    Transport from OR: Transported from OR intubated on 100% O2 by AMBU with assisted ventilation. Upon arrival to PACU/ICU, patient attached to ventilator and auscultated to confirm bilateral breath sounds and adequate TV. Continuous ECG monitoring in transport. Continuous SpO2 monitoring in transport. Continuos invasive BP monitoring in transport    Post pain: adequate analgesia    Post assessment: no apparent anesthetic complications    Post vital signs: stable    Level of consciousness: sedated    Nausea/Vomiting: no nausea/vomiting    Complications: none    Transfer of care protocol was followed      Last vitals:   Visit Vitals  /69 (BP Location: Right arm, Patient Position: Lying)   Pulse 88   Temp 36.9 °C (98.5 °F) (Oral)   Resp 20   SpO2 100%

## 2022-03-09 NOTE — ANESTHESIA PREPROCEDURE EVALUATION
03/09/2022  Og Garcia is a 66 y.o., male.  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    I have reviewed the NPO Status.   I have reviewed the Medications.     Review of Systems  Social:  Non-Smoker, No Alcohol Use    Hematology/Oncology:  Hematology Normal   Oncology Normal     EENT/Dental:EENT/Dental Normal   Cardiovascular:   Hypertension ECG has been reviewed.    Pulmonary:   trach   Renal/:   Chronic Renal Disease, ESRD, Dialysis    Hepatic/GI:  Hepatic/GI Normal    Musculoskeletal:  Musculoskeletal Normal    Neurological:  Neurology Normal    Endocrine:   Diabetes    Dermatological:  Skin Normal    Psych:  Psychiatric Normal           Physical Exam    Airway:  Mallampati: II / II  Mouth Opening: Normal  TM Distance: Normal  Neck ROM: Normal ROM  Pre-Existing Airway: Tracheostomy tube    Chest/Lungs:  Clear to auscultation    Heart:  Rate: Normal  Rhythm: Regular Rhythm  Sounds: Normal        Chemistry        Component Value Date/Time     (L) 03/09/2022 0634    K 5.3 (H) 03/09/2022 0634    CL 94 (L) 03/09/2022 0634    CO2 23 03/09/2022 0634    BUN 76 (H) 03/09/2022 0634    CREATININE 4.15 (H) 03/09/2022 0634     03/09/2022 0634        Component Value Date/Time    CALCIUM 8.8 03/09/2022 0634    ALKPHOS 631 (H) 02/14/2022 0705     (H) 02/14/2022  0705    ALT 75 (H) 02/14/2022 0705    BILITOT 0.4 02/14/2022 0705    ESTGFRAFRICA 15 (L) 01/10/2022 0539    EGFRNONAA 15 (L) 03/09/2022 0634        Lab Results   Component Value Date    WBC 19.67 (H) 03/09/2022    RBC 2.79 (L) 03/09/2022    HGB 8.0 (L) 03/09/2022    MCV 89.6 03/09/2022    MCH 28.7 03/09/2022    MCHC 32.0 03/09/2022    RDW 15.6 (H) 03/09/2022     03/09/2022    MPV 8.5 (L) 03/09/2022    LYMPH 16.9 (L) 03/09/2022    LYMPH 3.33 03/09/2022    LYMPH 13 (L) 03/09/2022    MONO 8.7 (H) 03/09/2022    MONO 9 (H) 03/09/2022    EOS 1.51 (H) 03/09/2022    BASO 0.08 03/09/2022     Results for orders placed or performed during the hospital encounter of 12/13/21   EKG 12-lead    Collection Time: 12/17/21  3:46 PM    Narrative    Test Reason : Q24.9,    Vent. Rate : 094 BPM     Atrial Rate : 000 BPM     P-R Int : 140 ms          QRS Dur : 150 ms      QT Int : 394 ms       P-R-T Axes : 070 -53 048 degrees     QTc Int : 454 ms    Sinus rhythm  RBBB with left anterior fascicular block  Inferior infarct - age undetermined  Abnormal ECG    Confirmed by Zachary Mosley DO ([id number]) on 12/22/2021 10:01:35 AM    Referred By: [email or web address]   SELF           Confirmed By:Zachary Mosley DO         Anesthesia Plan  Type of Anesthesia, risks & benefits discussed:    Anesthesia Type: Gen Natural Airway  Intra-op Monitoring Plan: Standard ASA Monitors  Post Op Pain Control Plan: multimodal analgesia  Induction:  Inhalation  Airway Plan: Direct  Informed Consent: Informed consent signed with the Patient representative and all parties understand the risks and agree with anesthesia plan.  All questions answered.   ASA Score: 4  Day of Surgery Review of History & Physical: H&P Update referred to the surgeon/provider.    Ready For Surgery From Anesthesia Perspective.     .

## 2022-03-09 NOTE — ASSESSMENT & PLAN NOTE
2/26 -Tmax 101.4 -- resp culture - with GNB - will start Zosyn today - follow cultures   BC x 2 NGTD   2/28 - resp culture with pseudomonas and klebsiella  treat with zosyn for 7 days   Blood culture from 2/25 also growing Pseudomonas. Sensitive to zosyn. Will need to continue zosyn for at least 10 days total  3/9 He has completed antibiotics

## 2022-03-09 NOTE — ANESTHESIA POSTPROCEDURE EVALUATION
Anesthesia Post Evaluation    Patient: [facility]    Procedure(s) Performed: Procedure(s) (LRB):  EGD, WITH PEG TUBE INSERTION (N/A)    Final Anesthesia Type: general      Patient location during evaluation: PACU  Patient participation: Yes- Able to Participate  Level of consciousness: awake and sedated  Post-procedure vital signs: reviewed and stable  Pain management: adequate  Airway patency: patent    PONV status at discharge: No PONV  Anesthetic complications: no      Cardiovascular status: blood pressure returned to baseline  Respiratory status: unassisted  Hydration status: euvolemic  Follow-up not needed.          Vitals Value Taken Time   /42 03/09/22 1515   Temp 36.9 °C (98.5 °F) 03/09/22 1457   Pulse 96 03/09/22 1515   Resp 14 03/09/22 1515   SpO2 100 % 03/09/22 1515         Event Time   Out of Recovery 15:15:00         Pain/Rio Score: Rio Score: 8 (3/9/2022  3:15 PM)

## 2022-03-10 NOTE — PROGRESS NOTES
[facility]  Nephrology  Progress Note    Patient Name: Og Garcia  MRN: [id number]  Admission Date: 12/23/2021  Hospital Length of Stay: 77 days  Attending Provider: Cecil Abernathy DO   Primary Care Physician: Hector Arndt DNP, FNP-C  Principal Problem:Denice gangrene    Subjective:     HPI: The patient is known from the previous nephrology consult at [facility].  He is no at Specialty and continues to need dialysis support.      Interval History: No acute changes with this patient.    Review of patient's allergies indicates:  No Known Allergies  Current Facility-Administered Medications   Medication Frequency    0.9%  NaCl infusion (for blood administration) Q24H PRN    0.9%  NaCl infusion PRN    acetaminophen tablet 500 mg Q6H PRN    acetic acid 0.25 % irrigation PRN    albuterol nebulizer solution 2.5 mg Q4H PRN    bisacodyL EC tablet 10 mg Daily PRN    calcium gluconate 1 g in dextrose 5 % in water (D5W) 5 % 100 mL IVPB (MB+) Once    dextromethorphan-guaiFENesin  mg/5 ml liquid 10 mL Q6H PRN    dextrose 50% injection 12.5 g PRN    diltiaZEM tablet 60 mg Q6H    fentaNYL 50 mcg/hr 1 patch Q72H    glucagon (human recombinant) injection 1 mg PRN    guaiFENesin 100 mg/5 ml syrup 200 mg Q6H    heparin (porcine) injection 4,000 Units PRN    insulin aspart U-100 injection 1-10 Units Q6H    lorazepam injection 2 mg Q6H PRN    ondansetron injection 8 mg Q6H PRN    pantoprazole suspension 40 mg Daily    sevelamer carbonate pwpk 0.8 g TID WM    silver sulfADIAZINE 1% cream PRN    simethicone chewable tablet 80 mg TID PRN       Objective:     Vital Signs (Most Recent):  Temp: 98.8 °F (37.1 °C) (03/08/22 1545)  Pulse: 91 (03/08/22 1545)  Resp: 18 (03/08/22 1545)  BP: (!) 141/66 (03/08/22 1545)  SpO2: 100 % (03/08/22 1545)  O2 Device (Oxygen Therapy): ventilator (03/08/22 0700)   Vital Signs (24h Range):  Temp:  [98.6 °F (37 °C)-99.4 °F (37.4 °C)] 98.8 °F (37.1  °C)  Pulse:  [] 91  Resp:  [13-31] 18  SpO2:  [96 %-100 %] 100 %  BP: ()/(44-71) 141/66     Weight: 77.7 kg (171 lb 4.8 oz) (03/08/22 0500)  Body mass index is 23.89 kg/m².  Body surface area is 1.97 meters squared.    I/O last 3 completed shifts:  In: 3550 [I.V.:250; Blood:300; NG/GT:3000]  Out: 2335 [Urine:550; Other:1035; Stool:750]    Physical Exam  Vitals reviewed.   HENT:      Head: Atraumatic.   Cardiovascular:      Rate and Rhythm: Regular rhythm.      Heart sounds: Normal heart sounds.   Pulmonary:      Effort: Pulmonary effort is normal.   Abdominal:      General: Abdomen is flat.   Neurological:      Mental Status: He is alert.       Significant Labs:  BMP:   Recent Labs   Lab 03/08/22  0604   *   *   K 5.1   CL 95*   CO2 23   BUN 60*   CREATININE 3.21*   CALCIUM 8.6     CBC:   Recent Labs   Lab 03/08/22  0604   WBC 18.81*   RBC 2.96*   HGB 8.5*   HCT 26.6*   *   MCV 89.9   MCH 28.7   MCHC 32.0        Significant Imaging:  Labs: Reviewed    Assessment/Plan:     ESRD (end stage renal disease)  Dialysis not required today   3/7/2022 Continue with scheduled hemodialysis on MWF  3/8/2022 Dialysis session is going well.        Thank you for your consult. I will follow-up with patient. Please contact us if you have any additional questions.    Edwin Pryor Jr, MD  Nephrology  [facility]

## 2022-03-10 NOTE — PROGRESS NOTES
[facility]  Nephrology  Progress Note    Patient Name: Og Garcia  MRN: [id number]  Admission Date: 12/23/2021  Hospital Length of Stay: 77 days  Attending Provider: Cecil Abernathy DO   Primary Care Physician: Hector Arndt DNP, FNP-C  Principal Problem:Denice gangrene    Subjective:     HPI: The patient is known from the previous nephrology consult at [facility].  He is no at Specialty and continues to need dialysis support.      Interval History: The patient is now s/p peg tube placement.  He is doing acceptable.  No acute changes.    Review of patient's allergies indicates:  No Known Allergies  Current Facility-Administered Medications   Medication Frequency    0.9%  NaCl infusion (for blood administration) Q24H PRN    0.9%  NaCl infusion PRN    acetaminophen tablet 500 mg Q6H PRN    acetic acid 0.25 % irrigation PRN    albuterol nebulizer solution 2.5 mg Q4H PRN    bisacodyL EC tablet 10 mg Daily PRN    calcium gluconate 1 g in dextrose 5 % in water (D5W) 5 % 100 mL IVPB (MB+) Once    dextromethorphan-guaiFENesin  mg/5 ml liquid 10 mL Q6H PRN    dextrose 50% injection 12.5 g PRN    diltiaZEM tablet 60 mg Q6H    fentaNYL 50 mcg/hr 1 patch Q72H    glucagon (human recombinant) injection 1 mg PRN    guaiFENesin 100 mg/5 ml syrup 200 mg Q6H    heparin (porcine) injection 4,000 Units PRN    insulin aspart U-100 injection 1-10 Units Q6H    lorazepam injection 2 mg Q6H PRN    ondansetron injection 8 mg Q6H PRN    pantoprazole suspension 40 mg Daily    sevelamer carbonate pwpk 0.8 g TID WM    silver sulfADIAZINE 1% cream PRN    simethicone chewable tablet 80 mg TID PRN       Objective:     Vital Signs (Most Recent):  Temp: 98.1 °F (36.7 °C) (03/10/22 1600)  Pulse: 100 (03/10/22 0600)  Resp: (!) 23 (03/10/22 1253)  BP: (!) 135/56 (03/10/22 0600)  SpO2: 100 % (03/10/22 0600)  O2 Device (Oxygen Therapy): ventilator (03/10/22 1600)   Vital Signs (24h Range):  Temp:   [98.1 °F (36.7 °C)-99.1 °F (37.3 °C)] 98.1 °F (36.7 °C)  Pulse:  [] 100  Resp:  [16-30] 23  SpO2:  [100 %] 100 %  BP: (108-149)/(49-80) 135/56     Weight: 77.6 kg (171 lb 1.2 oz) (03/09/22 0500)  Body mass index is 23.86 kg/m².  Body surface area is 1.97 meters squared.    I/O last 3 completed shifts:  In: 950 [Other:500; NG/GT:450]  Out: 3000 [Urine:400; Other:2500; Stool:100]    Physical Exam  Vitals reviewed.   HENT:      Head: Atraumatic.   Cardiovascular:      Rate and Rhythm: Regular rhythm.   Pulmonary:      Effort: Pulmonary effort is normal.   Abdominal:      General: Abdomen is flat.   Neurological:      Mental Status: He is alert.       Significant Labs:  BMP:   Recent Labs   Lab 03/09/22  0634      *   K 5.3*   CL 94*   CO2 23   BUN 76*   CREATININE 4.15*   CALCIUM 8.8     CBC:   Recent Labs   Lab 03/09/22  0634   WBC 19.67*   RBC 2.79*   HGB 8.0*   HCT 25.0*      MCV 89.6   MCH 28.7   MCHC 32.0        Significant Imaging:  Labs: Reviewed    Assessment/Plan:     ESRD (end stage renal disease)  Dialysis not required today   3/7/2022 Continue with scheduled hemodialysis on MWF  3/8/2022 Dialysis session is going well.  3/10/2022 Continue with dialysis support.    Necrotizing fasciitis  Continue wound care and antibiotics  3/10/2022 Continue with wound care        Thank you for your consult. I will follow-up with patient. Please contact us if you have any additional questions.    Edwin Pryor Jr, MD  Nephrology  [facility]

## 2022-03-10 NOTE — SUBJECTIVE & OBJECTIVE
Interval History: No acute events overnight. Currently hemodynamically stable. Oxygenating adequately on the ventilator. Currently afebrile. Doing well with breathing trials.     Objective:     Vital Signs (Most Recent):  Temp: 98.9 °F (37.2 °C) (03/10/22 1200)  Pulse: 100 (03/10/22 0600)  Resp: (!) 23 (03/10/22 1253)  BP: (!) 135/56 (03/10/22 0600)  SpO2: 100 % (03/10/22 0600) Vital Signs (24h Range):  Temp:  [98.4 °F (36.9 °C)-98.9 °F (37.2 °C)] 98.9 °F (37.2 °C)  Pulse:  [] 100  Resp:  [11-34] 23  SpO2:  [98 %-100 %] 100 %  BP: (105-154)/(41-80) 135/56     Weight: 77.6 kg (171 lb 1.2 oz)  Body mass index is 23.86 kg/m².      Intake/Output Summary (Last 24 hours) at 3/10/2022 1300  Last data filed at 3/9/2022 1640  Gross per 24 hour   Intake 150 ml   Output --   Net 150 ml         Physical Exam  Vitals reviewed.   Constitutional:       General: He is not in acute distress.     Appearance: He is not ill-appearing.      Comments: Chronically ill appearing   HENT:      Head: Normocephalic and atraumatic.      Right Ear: External ear normal.      Left Ear: External ear normal.      Nose: Nose normal.      Mouth/Throat:      Mouth: Mucous membranes are moist.      Comments: trach  Eyes:      Conjunctiva/sclera: Conjunctivae normal.      Pupils: Pupils are equal, round, and reactive to light.   Cardiovascular:      Rate and Rhythm: Regular rhythm. Tachycardia present.      Pulses: Normal pulses.      Heart sounds: Normal heart sounds.   Pulmonary:      Effort: No respiratory distress.      Comments: clear breath sounds anteriorly  Abdominal:      Palpations: Abdomen is soft.   Musculoskeletal:         General: Normal range of motion.      Cervical back: Neck supple.   Lymphadenopathy:      Cervical: No cervical adenopathy.   Skin:     General: Skin is warm and dry.      Coloration: Skin is not pale.   Neurological:      Mental Status: Mental status is at baseline.      Comments: Moves spontaneously, tracks with  good eye   Psychiatric:         Behavior: Behavior normal.       Vents:  Vent Mode: CPAP/PSV (03/10/22 1247)  Set Rate: 0 BPM (03/10/22 0830)  Vt Set: 480 mL (03/10/22 0005)  Pressure Support: 12 cmH20 (03/10/22 1247)  PEEP/CPAP: 5 cmH20 (03/10/22 1247)  Oxygen Concentration (%): 35 (03/10/22 1247)  Peak Airway Pressure: 17 cmH2O (03/10/22 1247)  Plateau Pressure: 20 cmH20 (03/07/22 0500)  Total Ve: 9.2 mL (03/10/22 1247)  F/VT Ratio<105 (RSBI): (!) 71.98 (03/09/22 2035)    Lines/Drains/Airways       Peripherally Inserted Central Catheter Line  Duration             PICC Double Lumen 01/05/22 left basilic 64 days              Central Venous Catheter Line  Duration                  Hemodialysis Catheter 12/30/21 0900 right internal jugular 70 days              Drain  Duration                  Colostomy 12/18/21 1030 Descending/sigmoid LUQ 82 days    Male External Urinary Catheter 02/21/22 Medium 17 days         Gastrostomy/Enterostomy 03/09/22 1436 Percutaneous endoscopic gastrostomy (PEG) midline feeding <1 day              Airway  Duration                  Surgical Airway 01/04/22 Shiley 65 days                    Significant Labs:    CBC/Anemia Profile:  Recent Labs   Lab 03/09/22  0634   WBC 19.67*   HGB 8.0*   HCT 25.0*      MCV 89.6   RDW 15.6*          Chemistries:  Recent Labs   Lab 03/09/22  0634   *   K 5.3*   CL 94*   CO2 23   BUN 76*   CREATININE 4.15*   CALCIUM 8.8         All pertinent labs within the past 24 hours have been reviewed.    Significant Imaging:  I have reviewed all pertinent imaging results/findings within the past 24 hours.

## 2022-03-10 NOTE — ASSESSMENT & PLAN NOTE
Dialysis not required today   3/7/2022 Continue with scheduled hemodialysis on MWF  3/8/2022 Dialysis session is going well.  3/10/2022 Continue with dialysis support.

## 2022-03-10 NOTE — PROGRESS NOTES
[facility]  Pulmonology  Progress Note    Patient Name: Og Garcia  MRN: [id number]  Admission Date: 12/23/2021  Hospital Length of Stay: 77 days  Code Status: Prior  Attending Provider: Cecil Abernathy DO  Primary Care Provider: Hector Arndt DNP, FNP-C   Principal Problem: Denice gangrene    Subjective:     Interval History: No acute events overnight. Currently hemodynamically stable. Oxygenating adequately on the ventilator. Currently afebrile. Doing well with breathing trials.     Objective:     Vital Signs (Most Recent):  Temp: 98.9 °F (37.2 °C) (03/10/22 1200)  Pulse: 100 (03/10/22 0600)  Resp: (!) 23 (03/10/22 1253)  BP: (!) 135/56 (03/10/22 0600)  SpO2: 100 % (03/10/22 0600) Vital Signs (24h Range):  Temp:  [98.4 °F (36.9 °C)-98.9 °F (37.2 °C)] 98.9 °F (37.2 °C)  Pulse:  [] 100  Resp:  [11-34] 23  SpO2:  [98 %-100 %] 100 %  BP: (105-154)/(41-80) 135/56     Weight: 77.6 kg (171 lb 1.2 oz)  Body mass index is 23.86 kg/m².      Intake/Output Summary (Last 24 hours) at 3/10/2022 1300  Last data filed at 3/9/2022 1640  Gross per 24 hour   Intake 150 ml   Output --   Net 150 ml         Physical Exam  Vitals reviewed.   Constitutional:       General: He is not in acute distress.     Appearance: He is not ill-appearing.      Comments: Chronically ill appearing   HENT:      Head: Normocephalic and atraumatic.      Right Ear: External ear normal.      Left Ear: External ear normal.      Nose: Nose normal.      Mouth/Throat:      Mouth: Mucous membranes are moist.      Comments: trach  Eyes:      Conjunctiva/sclera: Conjunctivae normal.      Pupils: Pupils are equal, round, and reactive to light.   Cardiovascular:      Rate and Rhythm: Regular rhythm. Tachycardia present.      Pulses: Normal pulses.      Heart sounds: Normal heart sounds.   Pulmonary:      Effort: No respiratory distress.      Comments: clear breath sounds anteriorly  Abdominal:      Palpations: Abdomen is  soft.   Musculoskeletal:         General: Normal range of motion.      Cervical back: Neck supple.   Lymphadenopathy:      Cervical: No cervical adenopathy.   Skin:     General: Skin is warm and dry.      Coloration: Skin is not pale.   Neurological:      Mental Status: Mental status is at baseline.      Comments: Moves spontaneously, tracks with good eye   Psychiatric:         Behavior: Behavior normal.       Vents:  Vent Mode: CPAP/PSV (03/10/22 1247)  Set Rate: 0 BPM (03/10/22 0830)  Vt Set: 480 mL (03/10/22 0005)  Pressure Support: 12 cmH20 (03/10/22 1247)  PEEP/CPAP: 5 cmH20 (03/10/22 1247)  Oxygen Concentration (%): 35 (03/10/22 1247)  Peak Airway Pressure: 17 cmH2O (03/10/22 1247)  Plateau Pressure: 20 cmH20 (03/07/22 0500)  Total Ve: 9.2 mL (03/10/22 1247)  F/VT Ratio<105 (RSBI): (!) 71.98 (03/09/22 2035)    Lines/Drains/Airways       Peripherally Inserted Central Catheter Line  Duration             PICC Double Lumen 01/05/22 left basilic 64 days              Central Venous Catheter Line  Duration                  Hemodialysis Catheter 12/30/21 0900 right internal jugular 70 days              Drain  Duration                  Colostomy 12/18/21 1030 Descending/sigmoid LUQ 82 days    Male External Urinary Catheter 02/21/22 Medium 17 days         Gastrostomy/Enterostomy 03/09/22 1436 Percutaneous endoscopic gastrostomy (PEG) midline feeding <1 day              Airway  Duration                  Surgical Airway 01/04/22 Shiley 65 days                    Significant Labs:    CBC/Anemia Profile:  Recent Labs   Lab 03/09/22  0634   WBC 19.67*   HGB 8.0*   HCT 25.0*      MCV 89.6   RDW 15.6*          Chemistries:  Recent Labs   Lab 03/09/22  0634   *   K 5.3*   CL 94*   CO2 23   BUN 76*   CREATININE 4.15*   CALCIUM 8.8         All pertinent labs within the past 24 hours have been reviewed.    Significant Imaging:  I have reviewed all pertinent imaging results/findings within the past 24  hours.    Assessment/Plan:     * Denice gangrene  - s/p multiple debridements  - wound vac in place  - ID consulted for antibiotic management: He was treated with rocephin, daptomycin, clindamycin. 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 Cazares  2/22- back to OR yesterday for skin grafting  3/2- ask surgery to eval for possible PEG tube placement   3/6- plan for possible surgery this next week  3/10- had peg tube placed yesterday    Fever  2/26 -Tmax 101.4 -- resp culture - with GNB - will start Zosyn today - follow cultures   BC x 2 NGTD   2/28 - resp culture with pseudomonas and klebsiella  treat with zosyn for 7 days   Blood culture from 2/25 also growing Pseudomonas. Sensitive to zosyn. Will need to continue zosyn for at least 10 days total  3/9 He has completed antibiotics    ESRD (end stage renal disease)  Started on HD 12/30   Continue with HD per nephrology      Type 2 diabetes mellitus without complication, without long-term current use of insulin  - continue ISS  - glucose stable/controlled      On mechanically assisted ventilation  - 12/14 intubated, tracheostomy 1/4    3/3- increased to 8 hours BID   3/5- continue with current plan. If he does well through the weekend we will push to continuous Monday  3/6- increase PSV/CPAP to 5 hours tid today  03/07/2022 increased weaning as tolerated  3/9-will increase trials again today  3/10- I am going to see if he can do continuous PSV/CPAP starting today    Hypertension  - continue diltiazem  - 3/02- bp is on lower side today while on HD   3/10- bp is ok                 Cecil Abernathy, DO  Pulmonology  [facility]

## 2022-03-10 NOTE — ASSESSMENT & PLAN NOTE
- 12/14 intubated, tracheostomy 1/4    3/3- increased to 8 hours BID   3/5- continue with current plan. If he does well through the weekend we will push to continuous Monday  3/6- increase PSV/CPAP to 5 hours tid today  03/07/2022 increased weaning as tolerated  3/9-will increase trials again today  3/10- I am going to see if he can do continuous PSV/CPAP starting today

## 2022-03-10 NOTE — SUBJECTIVE & OBJECTIVE
Interval History: The patient is now s/p peg tube placement.  He is doing acceptable.  No acute changes.    Review of patient's allergies indicates:  No Known Allergies  Current Facility-Administered Medications   Medication Frequency    0.9%  NaCl infusion (for blood administration) Q24H PRN    0.9%  NaCl infusion PRN    acetaminophen tablet 500 mg Q6H PRN    acetic acid 0.25 % irrigation PRN    albuterol nebulizer solution 2.5 mg Q4H PRN    bisacodyL EC tablet 10 mg Daily PRN    calcium gluconate 1 g in dextrose 5 % in water (D5W) 5 % 100 mL IVPB (MB+) Once    dextromethorphan-guaiFENesin  mg/5 ml liquid 10 mL Q6H PRN    dextrose 50% injection 12.5 g PRN    diltiaZEM tablet 60 mg Q6H    fentaNYL 50 mcg/hr 1 patch Q72H    glucagon (human recombinant) injection 1 mg PRN    guaiFENesin 100 mg/5 ml syrup 200 mg Q6H    heparin (porcine) injection 4,000 Units PRN    insulin aspart U-100 injection 1-10 Units Q6H    lorazepam injection 2 mg Q6H PRN    ondansetron injection 8 mg Q6H PRN    pantoprazole suspension 40 mg Daily    sevelamer carbonate pwpk 0.8 g TID WM    silver sulfADIAZINE 1% cream PRN    simethicone chewable tablet 80 mg TID PRN       Objective:     Vital Signs (Most Recent):  Temp: 98.1 °F (36.7 °C) (03/10/22 1600)  Pulse: 100 (03/10/22 0600)  Resp: (!) 23 (03/10/22 1253)  BP: (!) 135/56 (03/10/22 0600)  SpO2: 100 % (03/10/22 0600)  O2 Device (Oxygen Therapy): ventilator (03/10/22 1600)   Vital Signs (24h Range):  Temp:  [98.1 °F (36.7 °C)-99.1 °F (37.3 °C)] 98.1 °F (36.7 °C)  Pulse:  [] 100  Resp:  [16-30] 23  SpO2:  [100 %] 100 %  BP: (108-149)/(49-80) 135/56     Weight: 77.6 kg (171 lb 1.2 oz) (03/09/22 0500)  Body mass index is 23.86 kg/m².  Body surface area is 1.97 meters squared.    I/O last 3 completed shifts:  In: 950 [Other:500; NG/GT:450]  Out: 3000 [Urine:400; Other:2500; Stool:100]    Physical Exam  Vitals reviewed.   HENT:      Head: Atraumatic.   Cardiovascular:      Rate and  Rhythm: Regular rhythm.   Pulmonary:      Effort: Pulmonary effort is normal.   Abdominal:      General: Abdomen is flat.   Neurological:      Mental Status: He is alert.       Significant Labs:  BMP:   Recent Labs   Lab 03/09/22  0634      *   K 5.3*   CL 94*   CO2 23   BUN 76*   CREATININE 4.15*   CALCIUM 8.8     CBC:   Recent Labs   Lab 03/09/22  0634   WBC 19.67*   RBC 2.79*   HGB 8.0*   HCT 25.0*      MCV 89.6   MCH 28.7   MCHC 32.0        Significant Imaging:  Labs: Reviewed

## 2022-03-10 NOTE — ASSESSMENT & PLAN NOTE
- s/p multiple debridements  - wound vac in place  - ID consulted for antibiotic management: He was treated with rocephin, daptomycin, clindamycin. 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 Cazares  2/22- back to OR yesterday for skin grafting  3/2- ask surgery to eval for possible PEG tube placement   3/6- plan for possible surgery this next week  3/10- had peg tube placed yesterday

## 2022-03-11 NOTE — PROGRESS NOTES
[facility]  Nephrology  Progress Note    Patient Name: Og Garcia  MRN: [id number]  Admission Date: 12/23/2021  Hospital Length of Stay: 78 days  Attending Provider: Cecil Abernathy DO   Primary Care Physician: Hector Arndt DNP, FNP-C  Principal Problem:Denice gangrene    Subjective:     HPI: The patient is known from the previous nephrology consult at [facility].  He is no at Specialty and continues to need dialysis support.      Interval History: The patient is resting.  No acute changes.    Review of patient's allergies indicates:  No Known Allergies  Current Facility-Administered Medications   Medication Frequency    0.9%  NaCl infusion (for blood administration) Q24H PRN    0.9%  NaCl infusion PRN    acetaminophen tablet 500 mg Q6H PRN    acetic acid 0.25 % irrigation PRN    albuterol nebulizer solution 2.5 mg Q4H PRN    bisacodyL EC tablet 10 mg Daily PRN    calcium gluconate 1 g in dextrose 5 % in water (D5W) 5 % 100 mL IVPB (MB+) Once    dextromethorphan-guaiFENesin  mg/5 ml liquid 10 mL Q6H PRN    dextrose 50% injection 12.5 g PRN    diltiaZEM tablet 60 mg Q6H    fentaNYL 50 mcg/hr 1 patch Q72H    glucagon (human recombinant) injection 1 mg PRN    guaiFENesin 100 mg/5 ml syrup 200 mg Q6H    heparin (porcine) injection 4,000 Units PRN    insulin aspart U-100 injection 1-10 Units Q6H    lorazepam injection 2 mg Q6H PRN    ondansetron injection 8 mg Q6H PRN    pantoprazole suspension 40 mg Daily    sevelamer carbonate pwpk 0.8 g TID WM    silver sulfADIAZINE 1% cream PRN    simethicone chewable tablet 80 mg TID PRN       Objective:     Vital Signs (Most Recent):  Temp: 98.7 °F (37.1 °C) (03/11/22 1215)  Pulse: 109 (03/11/22 1215)  Resp: 20 (03/11/22 1215)  BP: (!) 95/59 (03/11/22 1215)  SpO2: (!) 91 % (03/11/22 1215)  O2 Device (Oxygen Therapy): ventilator (03/11/22 0700)   Vital Signs (24h Range):  Temp:  [98.1 °F (36.7 °C)-99.7 °F (37.6 °C)] 98.7 °F  (37.1 °C)  Pulse:  [] 109  Resp:  [11-25] 20  SpO2:  [91 %-100 %] 91 %  BP: ()/(44-83) 95/59     Weight: 77.9 kg (171 lb 11.8 oz) (03/11/22 0500)  Body mass index is 23.95 kg/m².  Body surface area is 1.98 meters squared.    I/O last 3 completed shifts:  In: 150 [NG/GT:150]  Out: -     Physical Exam  Vitals reviewed.   HENT:      Head: Normocephalic.   Cardiovascular:      Rate and Rhythm: Regular rhythm.   Pulmonary:      Effort: Pulmonary effort is normal.      Comments: ventilated  Abdominal:      General: Abdomen is flat.      Palpations: Abdomen is soft.      Comments: Peg tube in place.   Neurological:      Mental Status: He is alert.       Significant Labs:  BMP:   Recent Labs   Lab 03/09/22  0634      *   K 5.3*   CL 94*   CO2 23   BUN 76*   CREATININE 4.15*   CALCIUM 8.8     CBC:   Recent Labs   Lab 03/09/22  0634   WBC 19.67*   RBC 2.79*   HGB 8.0*   HCT 25.0*      MCV 89.6   MCH 28.7   MCHC 32.0        Significant Imaging:  Labs: Reviewed    Assessment/Plan:     * Denice gangrene  Continue wound care  Wound care.    ESRD (end stage renal disease)  Dialysis not required today   3/7/2022 Continue with scheduled hemodialysis on MWF  3/8/2022 Dialysis session is going well.  3/10/2022 Continue with dialysis support.  3/11/2022 Dialysis as scheduled.        Thank you for your consult. I will follow-up with patient. Please contact us if you have any additional questions.    Edwin Pryor Jr, MD  Nephrology  [facility]

## 2022-03-11 NOTE — PLAN OF CARE
Problem: Device-Related Complication Risk (Hemodialysis)  Goal: Safe, Effective Therapy Delivery  Outcome: Ongoing, Progressing     Problem: Hemodynamic Instability (Hemodialysis)  Goal: Effective Tissue Perfusion  Outcome: Ongoing, Progressing     Problem: Infection (Hemodialysis)  Goal: Absence of Infection Signs and Symptoms  Outcome: Ongoing, Progressing

## 2022-03-11 NOTE — SUBJECTIVE & OBJECTIVE
Interval History: The patient is resting.  No acute changes.    Review of patient's allergies indicates:  No Known Allergies  Current Facility-Administered Medications   Medication Frequency    0.9%  NaCl infusion (for blood administration) Q24H PRN    0.9%  NaCl infusion PRN    acetaminophen tablet 500 mg Q6H PRN    acetic acid 0.25 % irrigation PRN    albuterol nebulizer solution 2.5 mg Q4H PRN    bisacodyL EC tablet 10 mg Daily PRN    calcium gluconate 1 g in dextrose 5 % in water (D5W) 5 % 100 mL IVPB (MB+) Once    dextromethorphan-guaiFENesin  mg/5 ml liquid 10 mL Q6H PRN    dextrose 50% injection 12.5 g PRN    diltiaZEM tablet 60 mg Q6H    fentaNYL 50 mcg/hr 1 patch Q72H    glucagon (human recombinant) injection 1 mg PRN    guaiFENesin 100 mg/5 ml syrup 200 mg Q6H    heparin (porcine) injection 4,000 Units PRN    insulin aspart U-100 injection 1-10 Units Q6H    lorazepam injection 2 mg Q6H PRN    ondansetron injection 8 mg Q6H PRN    pantoprazole suspension 40 mg Daily    sevelamer carbonate pwpk 0.8 g TID WM    silver sulfADIAZINE 1% cream PRN    simethicone chewable tablet 80 mg TID PRN       Objective:     Vital Signs (Most Recent):  Temp: 98.7 °F (37.1 °C) (03/11/22 1215)  Pulse: 109 (03/11/22 1215)  Resp: 20 (03/11/22 1215)  BP: (!) 95/59 (03/11/22 1215)  SpO2: (!) 91 % (03/11/22 1215)  O2 Device (Oxygen Therapy): ventilator (03/11/22 0700)   Vital Signs (24h Range):  Temp:  [98.1 °F (36.7 °C)-99.7 °F (37.6 °C)] 98.7 °F (37.1 °C)  Pulse:  [] 109  Resp:  [11-25] 20  SpO2:  [91 %-100 %] 91 %  BP: ()/(44-83) 95/59     Weight: 77.9 kg (171 lb 11.8 oz) (03/11/22 0500)  Body mass index is 23.95 kg/m².  Body surface area is 1.98 meters squared.    I/O last 3 completed shifts:  In: 150 [NG/GT:150]  Out: -     Physical Exam  Vitals reviewed.   HENT:      Head: Normocephalic.   Cardiovascular:      Rate and Rhythm: Regular rhythm.   Pulmonary:      Effort: Pulmonary effort is normal.       Comments: ventilated  Abdominal:      General: Abdomen is flat.      Palpations: Abdomen is soft.      Comments: Peg tube in place.   Neurological:      Mental Status: He is alert.       Significant Labs:  BMP:   Recent Labs   Lab 03/09/22  0634      *   K 5.3*   CL 94*   CO2 23   BUN 76*   CREATININE 4.15*   CALCIUM 8.8     CBC:   Recent Labs   Lab 03/09/22  0634   WBC 19.67*   RBC 2.79*   HGB 8.0*   HCT 25.0*      MCV 89.6   MCH 28.7   MCHC 32.0        Significant Imaging:  Labs: Reviewed

## 2022-03-11 NOTE — PROGRESS NOTES
[facility]  Pulmonology  Progress Note    Patient Name: Og Garcia  MRN: [id number]  Admission Date: 12/23/2021  Hospital Length of Stay: 78 days  Code Status: Prior  Attending Provider: Cecil Abernathy DO  Primary Care Provider: Hector Arndt DNP, FNP-C   Principal Problem: Denice gangrene    Subjective:     Interval History:  Patient will follow-up simple commands    Objective:     Vital Signs (Most Recent):  Temp: 98.5 °F (36.9 °C) (03/11/22 0300)  Pulse: 96 (03/11/22 0600)  Resp: 19 (03/11/22 0600)  BP: (!) 125/55 (03/11/22 0600)  SpO2: 100 % (03/11/22 0600)   Vital Signs (24h Range):  Temp:  [98.1 °F (36.7 °C)-99.7 °F (37.6 °C)] 98.5 °F (36.9 °C)  Pulse:  [] 96  Resp:  [11-26] 19  SpO2:  [99 %-100 %] 100 %  BP: (109-172)/(44-83) 125/55     Weight: 77.9 kg (171 lb 11.8 oz)  Body mass index is 23.95 kg/m².      Intake/Output Summary (Last 24 hours) at 3/11/2022 0650  Last data filed at 3/10/2022 1145  Gross per 24 hour   Intake 150 ml   Output --   Net 150 ml       Physical Exam  Vitals reviewed.   Constitutional:       Appearance: Normal appearance.      Interventions: He is intubated.      Comments: Tracheostomy   HENT:      Head: Normocephalic and atraumatic.      Nose: Nose normal.      Mouth/Throat:      Mouth: Mucous membranes are dry.      Pharynx: Oropharynx is clear.   Eyes:      Extraocular Movements: Extraocular movements intact.      Conjunctiva/sclera: Conjunctivae normal.      Pupils: Pupils are equal, round, and reactive to light.   Cardiovascular:      Rate and Rhythm: Normal rate.      Heart sounds: Normal heart sounds. No murmur heard.  Pulmonary:      Effort: Pulmonary effort is normal. He is intubated.      Breath sounds: Normal breath sounds.   Abdominal:      General: Abdomen is flat. Bowel sounds are normal.      Palpations: Abdomen is soft.   Musculoskeletal:         General: Normal range of motion.      Cervical back: Normal range of motion and  neck supple.      Right lower leg: No edema.      Left lower leg: No edema.   Skin:     General: Skin is warm and dry.      Capillary Refill: Capillary refill takes less than 2 seconds.   Neurological:      General: No focal deficit present.      Mental Status: He is alert and oriented to person, place, and time.   Psychiatric:         Mood and Affect: Mood normal.         Behavior: Behavior normal.       Vents:  Vent Mode: CPAP/PSV (03/11/22 0300)  Set Rate: 0 BPM (03/10/22 0830)  Vt Set: 480 mL (03/10/22 0005)  Pressure Support: 12 cmH20 (03/11/22 0300)  PEEP/CPAP: 5 cmH20 (03/11/22 0300)  Oxygen Concentration (%): 35 (03/10/22 2013)  Peak Airway Pressure: 17 cmH2O (03/11/22 0300)  Plateau Pressure: 20 cmH20 (03/07/22 0500)  Total Ve: 8.5 mL (03/11/22 0300)  F/VT Ratio<105 (RSBI): (!) 35.42 (03/11/22 0300)    Lines/Drains/Airways       Peripherally Inserted Central Catheter Line  Duration             PICC Double Lumen 01/05/22 left basilic 65 days              Central Venous Catheter Line  Duration                  Hemodialysis Catheter 12/30/21 0900 right internal jugular 70 days              Drain  Duration                  Colostomy 12/18/21 1030 Descending/sigmoid LUQ 82 days    Male External Urinary Catheter 02/21/22 Medium 18 days         Gastrostomy/Enterostomy 03/09/22 1436 Percutaneous endoscopic gastrostomy (PEG) midline feeding 1 day              Airway  Duration                  Surgical Airway 01/04/22 Shiley 66 days                    Significant Labs:    CBC/Anemia Profile:  No results for input(s): WBC, HGB, HCT, PLT, MCV, RDW, IRON, FERRITIN, RETIC, FOLATE, [id number], OCCULTBLOOD in the last 48 hours.     Chemistries:  No results for input(s): NA, K, CL, CO2, BUN, CREATININE, CALCIUM, ALBUMIN, PROT, BILITOT, ALKPHOS, ALT, AST, GLUCOSE, MG, PHOS in the last 48 hours.    All pertinent labs within the past 24 hours have been reviewed.    Significant Imaging:  I have reviewed all pertinent imaging  results/findings within the past 24 hours.    Assessment/Plan:     * Denice gangrene  - s/p multiple debridements  - wound vac in place  - ID consulted for antibiotic management: He was treated with rocephin, daptomycin, clindamycin. 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 Cazares  2/22- back to OR yesterday for skin grafting  3/2- ask surgery to eval for possible PEG tube placement   3/6- plan for possible surgery this next week  3/10- had peg tube placed yesterday    On mechanically assisted ventilation  - 12/14 intubated, tracheostomy 1/4    3/3- increased to 8 hours BID   3/5- continue with current plan. If he does well through the weekend we will push to continuous Monday  3/6- increase PSV/CPAP to 5 hours tid today  03/07/2022 increased weaning as tolerated  3/9-will increase trials again today  3/10- I am going to see if he can do continuous PSV/CPAP starting today    Hypertension  - continue diltiazem  - 3/02- bp is on lower side today while on HD   3/10- bp is ok    Fever  2/26 -Tmax 101.4 -- resp culture - with GNB - will start Zosyn today - follow cultures   BC x 2 NGTD   2/28 - resp culture with pseudomonas and klebsiella  treat with zosyn for 7 days   Blood culture from 2/25 also growing Pseudomonas. Sensitive to zosyn. Will need to continue zosyn for at least 10 days total  3/9 He has completed antibiotics    ESRD (end stage renal disease)  Started on HD 12/30   Continue with HD per nephrology      Type 2 diabetes mellitus without complication, without long-term current use of insulin  - continue ISS  - glucose stable/controlled      Acute blood loss anemia  Has required multiple transfusions this  admission    Most recent Hgb is 8.0 -                 Jose Urban MD  Pulmonology  [facility]

## 2022-03-11 NOTE — SUBJECTIVE & OBJECTIVE
Interval History:  Patient will follow-up simple commands    Objective:     Vital Signs (Most Recent):  Temp: 98.5 °F (36.9 °C) (03/11/22 0300)  Pulse: 96 (03/11/22 0600)  Resp: 19 (03/11/22 0600)  BP: (!) 125/55 (03/11/22 0600)  SpO2: 100 % (03/11/22 0600)   Vital Signs (24h Range):  Temp:  [98.1 °F (36.7 °C)-99.7 °F (37.6 °C)] 98.5 °F (36.9 °C)  Pulse:  [] 96  Resp:  [11-26] 19  SpO2:  [99 %-100 %] 100 %  BP: (109-172)/(44-83) 125/55     Weight: 77.9 kg (171 lb 11.8 oz)  Body mass index is 23.95 kg/m².      Intake/Output Summary (Last 24 hours) at 3/11/2022 0650  Last data filed at 3/10/2022 1145  Gross per 24 hour   Intake 150 ml   Output --   Net 150 ml       Physical Exam  Vitals reviewed.   Constitutional:       Appearance: Normal appearance.      Interventions: He is intubated.      Comments: Tracheostomy   HENT:      Head: Normocephalic and atraumatic.      Nose: Nose normal.      Mouth/Throat:      Mouth: Mucous membranes are dry.      Pharynx: Oropharynx is clear.   Eyes:      Extraocular Movements: Extraocular movements intact.      Conjunctiva/sclera: Conjunctivae normal.      Pupils: Pupils are equal, round, and reactive to light.   Cardiovascular:      Rate and Rhythm: Normal rate.      Heart sounds: Normal heart sounds. No murmur heard.  Pulmonary:      Effort: Pulmonary effort is normal. He is intubated.      Breath sounds: Normal breath sounds.   Abdominal:      General: Abdomen is flat. Bowel sounds are normal.      Palpations: Abdomen is soft.   Musculoskeletal:         General: Normal range of motion.      Cervical back: Normal range of motion and neck supple.      Right lower leg: No edema.      Left lower leg: No edema.   Skin:     General: Skin is warm and dry.      Capillary Refill: Capillary refill takes less than 2 seconds.   Neurological:      General: No focal deficit present.      Mental Status: He is alert and oriented to person, place, and time.   Psychiatric:         Mood and  Affect: Mood normal.         Behavior: Behavior normal.       Vents:  Vent Mode: CPAP/PSV (03/11/22 0300)  Set Rate: 0 BPM (03/10/22 0830)  Vt Set: 480 mL (03/10/22 0005)  Pressure Support: 12 cmH20 (03/11/22 0300)  PEEP/CPAP: 5 cmH20 (03/11/22 0300)  Oxygen Concentration (%): 35 (03/10/22 2013)  Peak Airway Pressure: 17 cmH2O (03/11/22 0300)  Plateau Pressure: 20 cmH20 (03/07/22 0500)  Total Ve: 8.5 mL (03/11/22 0300)  F/VT Ratio<105 (RSBI): (!) 35.42 (03/11/22 0300)    Lines/Drains/Airways       Peripherally Inserted Central Catheter Line  Duration             PICC Double Lumen 01/05/22 left basilic 65 days              Central Venous Catheter Line  Duration                  Hemodialysis Catheter 12/30/21 0900 right internal jugular 70 days              Drain  Duration                  Colostomy 12/18/21 1030 Descending/sigmoid LUQ 82 days    Male External Urinary Catheter 02/21/22 Medium 18 days         Gastrostomy/Enterostomy 03/09/22 1436 Percutaneous endoscopic gastrostomy (PEG) midline feeding 1 day              Airway  Duration                  Surgical Airway 01/04/22 Shiley 66 days                    Significant Labs:    CBC/Anemia Profile:  No results for input(s): WBC, HGB, HCT, PLT, MCV, RDW, IRON, FERRITIN, RETIC, FOLATE, [id number], OCCULTBLOOD in the last 48 hours.     Chemistries:  No results for input(s): NA, K, CL, CO2, BUN, CREATININE, CALCIUM, ALBUMIN, PROT, BILITOT, ALKPHOS, ALT, AST, GLUCOSE, MG, PHOS in the last 48 hours.    All pertinent labs within the past 24 hours have been reviewed.    Significant Imaging:  I have reviewed all pertinent imaging results/findings within the past 24 hours.

## 2022-03-11 NOTE — ASSESSMENT & PLAN NOTE
Dialysis not required today   3/7/2022 Continue with scheduled hemodialysis on MWF  3/8/2022 Dialysis session is going well.  3/10/2022 Continue with dialysis support.  3/11/2022 Dialysis as scheduled.

## 2022-03-12 NOTE — ASSESSMENT & PLAN NOTE
- s/p multiple debridements  - wound vac in place  - ID consulted for antibiotic management: He was treated with rocephin, daptomycin, clindamycin. 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 Cazares  2/21- back to OR for skin grafting  3/9- had peg tube placed

## 2022-03-12 NOTE — PROGRESS NOTES
[facility]  Pulmonology  Progress Note    Patient Name: Og Garcia  MRN: [id number]  Admission Date: 12/23/2021  Hospital Length of Stay: 79 days  Code Status: Prior  Attending Provider: Cecil Abernathy DO  Primary Care Provider: Hector Arndt DNP, FNP-C   Principal Problem: Denice gangrene    Subjective:     Interval History: Appears comfortable on PSV, does have occasional apnea spells.     Objective:     Vital Signs (Most Recent):  Temp: 98 °F (36.7 °C) (03/12/22 0800)  Pulse: 88 (03/12/22 1000)  Resp: (!) 29 (03/12/22 1000)  BP: 134/62 (03/12/22 1000)  SpO2: 100 % (03/12/22 1000)   Vital Signs (24h Range):  Temp:  [98 °F (36.7 °C)-102.3 °F (39.1 °C)] 98 °F (36.7 °C)  Pulse:  [] 88  Resp:  [11-29] 29  SpO2:  [91 %-100 %] 100 %  BP: ()/(43-82) 134/62     Weight: 77.9 kg (171 lb 11.8 oz)  Body mass index is 23.95 kg/m².      Intake/Output Summary (Last 24 hours) at 3/12/2022 1151  Last data filed at 3/12/2022 0602  Gross per 24 hour   Intake 1250 ml   Output 2600 ml   Net -1350 ml       Physical Exam  Vitals reviewed.   Constitutional:       General: He is not in acute distress.  HENT:      Head:      Comments: Right temporal deformity     Nose: Nose normal.      Mouth/Throat:      Mouth: Mucous membranes are moist.   Eyes:      Comments: enucleated right eye   Neck:      Comments: Tracheostomy in place  Cardiovascular:      Rate and Rhythm: Normal rate and regular rhythm.      Pulses: Normal pulses.      Heart sounds: Normal heart sounds.   Pulmonary:      Effort: No respiratory distress.      Breath sounds: No stridor. No wheezing, rhonchi or rales.      Comments: Ventilator driven breath sounds bilaterally. Coarse  Abdominal:      Palpations: Abdomen is soft.      Tenderness: There is no guarding.      Comments: PEG   Genitourinary:     Comments: Packing in place  Musculoskeletal:         General: No swelling or deformity.      Cervical back: Neck supple.       Right lower leg: No edema.      Left lower leg: No edema.   Lymphadenopathy:      Cervical: No cervical adenopathy.   Skin:     General: Skin is warm and dry.   Neurological:      Mental Status: He is alert.      Cranial Nerves: No cranial nerve deficit.      Motor: Weakness present.       Vents:  Vent Mode: PSV (03/12/22 0750)  Set Rate: 14 BPM (03/12/22 0432)  Vt Set: 480 mL (03/12/22 0432)  Pressure Support: 12 cmH20 (03/12/22 0750)  PEEP/CPAP: 5 cmH20 (03/12/22 0750)  Oxygen Concentration (%): 35 (03/12/22 0752)  Peak Airway Pressure: 18 cmH2O (03/12/22 0750)  Plateau Pressure: 20 cmH20 (03/07/22 0500)  Total Ve: 6.4 mL (03/12/22 0750)  F/VT Ratio<105 (RSBI): (!) 29.08 (03/12/22 0750)    Lines/Drains/Airways       Peripherally Inserted Central Catheter Line  Duration             PICC Double Lumen 01/05/22 left basilic 66 days              Central Venous Catheter Line  Duration                  Hemodialysis Catheter 12/30/21 0900 right internal jugular 72 days              Drain  Duration                  Colostomy 12/18/21 1030 Descending/sigmoid LUQ 84 days         Gastrostomy/Enterostomy 03/09/22 1436 Percutaneous endoscopic gastrostomy (PEG) midline feeding 2 days              Airway  Duration                  Surgical Airway 01/04/22 Shiley 67 days                    Significant Labs:    CBC/Anemia Profile:  No results for input(s): WBC, HGB, HCT, PLT, MCV, RDW, IRON, FERRITIN, RETIC, FOLATE, [id number], OCCULTBLOOD in the last 48 hours.     Chemistries:  No results for input(s): NA, K, CL, CO2, BUN, CREATININE, CALCIUM, ALBUMIN, PROT, BILITOT, ALKPHOS, ALT, AST, GLUCOSE, MG, PHOS in the last 48 hours.    None    Significant Imaging:  None    Assessment/Plan:     * Denice gangrene  - s/p multiple debridements  - wound vac in place  - ID consulted for antibiotic management: He was treated with rocephin, daptomycin, clindamycin. 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 Cazares  2/21- back to OR for skin grafting  3/9- had peg tube placed     ESRD (end stage renal disease)  Started on HD 12/30   Continue with HD per nephrology      Type 2 diabetes mellitus without complication, without long-term current use of insulin  - continue ISS  - glucose stable/controlled      On mechanically assisted ventilation  - 12/14 intubated, tracheostomy 1/4  - continuous PSV/CPAP     Hypertension  - continue diltiazem                   Raul Hall MD  Pulmonology  [facility]

## 2022-03-12 NOTE — NURSING
Patients heart rate increased to 140's. Scheduled Cardizem given. Patient taken off CPAP and placed back on Assist control settings. Dr. Hall Notified he will be by to see patient.

## 2022-03-12 NOTE — RESPIRATORY THERAPY
Patient did one trial today for about 4 hrs patient was put back on normal settigs due to patient tired.

## 2022-03-12 NOTE — SUBJECTIVE & OBJECTIVE
Interval History: Appears comfortable on PSV, does have occasional apnea spells.     Objective:     Vital Signs (Most Recent):  Temp: 98 °F (36.7 °C) (03/12/22 0800)  Pulse: 88 (03/12/22 1000)  Resp: (!) 29 (03/12/22 1000)  BP: 134/62 (03/12/22 1000)  SpO2: 100 % (03/12/22 1000)   Vital Signs (24h Range):  Temp:  [98 °F (36.7 °C)-102.3 °F (39.1 °C)] 98 °F (36.7 °C)  Pulse:  [] 88  Resp:  [11-29] 29  SpO2:  [91 %-100 %] 100 %  BP: ()/(43-82) 134/62     Weight: 77.9 kg (171 lb 11.8 oz)  Body mass index is 23.95 kg/m².      Intake/Output Summary (Last 24 hours) at 3/12/2022 1151  Last data filed at 3/12/2022 0602  Gross per 24 hour   Intake 1250 ml   Output 2600 ml   Net -1350 ml       Physical Exam  Vitals reviewed.   Constitutional:       General: He is not in acute distress.  HENT:      Head:      Comments: Right temporal deformity     Nose: Nose normal.      Mouth/Throat:      Mouth: Mucous membranes are moist.   Eyes:      Comments: enucleated right eye   Neck:      Comments: Tracheostomy in place  Cardiovascular:      Rate and Rhythm: Normal rate and regular rhythm.      Pulses: Normal pulses.      Heart sounds: Normal heart sounds.   Pulmonary:      Effort: No respiratory distress.      Breath sounds: No stridor. No wheezing, rhonchi or rales.      Comments: Ventilator driven breath sounds bilaterally. Coarse  Abdominal:      Palpations: Abdomen is soft.      Tenderness: There is no guarding.      Comments: PEG   Genitourinary:     Comments: Packing in place  Musculoskeletal:         General: No swelling or deformity.      Cervical back: Neck supple.      Right lower leg: No edema.      Left lower leg: No edema.   Lymphadenopathy:      Cervical: No cervical adenopathy.   Skin:     General: Skin is warm and dry.   Neurological:      Mental Status: He is alert.      Cranial Nerves: No cranial nerve deficit.      Motor: Weakness present.       Vents:  Vent Mode: PSV (03/12/22 0750)  Set Rate: 14 BPM  (03/12/22 0432)  Vt Set: 480 mL (03/12/22 0432)  Pressure Support: 12 cmH20 (03/12/22 0750)  PEEP/CPAP: 5 cmH20 (03/12/22 0750)  Oxygen Concentration (%): 35 (03/12/22 0752)  Peak Airway Pressure: 18 cmH2O (03/12/22 0750)  Plateau Pressure: 20 cmH20 (03/07/22 0500)  Total Ve: 6.4 mL (03/12/22 0750)  F/VT Ratio<105 (RSBI): (!) 29.08 (03/12/22 0750)    Lines/Drains/Airways       Peripherally Inserted Central Catheter Line  Duration             PICC Double Lumen 01/05/22 left basilic 66 days              Central Venous Catheter Line  Duration                  Hemodialysis Catheter 12/30/21 0900 right internal jugular 72 days              Drain  Duration                  Colostomy 12/18/21 1030 Descending/sigmoid LUQ 84 days         Gastrostomy/Enterostomy 03/09/22 1436 Percutaneous endoscopic gastrostomy (PEG) midline feeding 2 days              Airway  Duration                  Surgical Airway 01/04/22 Shiley 67 days                    Significant Labs:    CBC/Anemia Profile:  No results for input(s): WBC, HGB, HCT, PLT, MCV, RDW, IRON, FERRITIN, RETIC, FOLATE, [id number], OCCULTBLOOD in the last 48 hours.     Chemistries:  No results for input(s): NA, K, CL, CO2, BUN, CREATININE, CALCIUM, ALBUMIN, PROT, BILITOT, ALKPHOS, ALT, AST, GLUCOSE, MG, PHOS in the last 48 hours.    None    Significant Imaging:  None

## 2022-03-13 NOTE — SUBJECTIVE & OBJECTIVE
Interval History: On control ventilation this am, was having frequent apnea spells per report.     Objective:     Vital Signs (Most Recent):  Temp: 99.6 °F (37.6 °C) (03/13/22 0800)  Pulse: 84 (03/13/22 0800)  Resp: (!) 26 (03/13/22 0800)  BP: (!) 121/58 (03/13/22 0800)  SpO2: 100 % (03/13/22 0800)   Vital Signs (24h Range):  Temp:  [98.8 °F (37.1 °C)-99.6 °F (37.6 °C)] 99.6 °F (37.6 °C)  Pulse:  [] 84  Resp:  [14-29] 26  SpO2:  [96 %-100 %] 100 %  BP: (109-142)/(53-81) 121/58     Weight: 77.9 kg (171 lb 11.8 oz)  Body mass index is 23.95 kg/m².      Intake/Output Summary (Last 24 hours) at 3/13/2022 0929  Last data filed at 3/12/2022 1800  Gross per 24 hour   Intake 600 ml   Output 200 ml   Net 400 ml       Physical Exam  Vitals reviewed.   Constitutional:       General: He is not in acute distress.  HENT:      Head:      Comments: Right temporal deformity     Nose: Nose normal.      Mouth/Throat:      Mouth: Mucous membranes are moist.   Eyes:      Comments: enucleated right eye   Neck:      Comments: Tracheostomy in place  Cardiovascular:      Rate and Rhythm: Normal rate and regular rhythm.      Pulses: Normal pulses.      Heart sounds: Normal heart sounds.   Pulmonary:      Effort: No respiratory distress.      Breath sounds: No stridor. No wheezing, rhonchi or rales.      Comments: Ventilator driven breath sounds bilaterally. Coarse  Abdominal:      Palpations: Abdomen is soft.      Tenderness: There is no guarding.      Comments: PEG   Genitourinary:     Comments: Packing in place  Musculoskeletal:         General: No swelling or deformity.      Cervical back: Neck supple.      Right lower leg: No edema.      Left lower leg: No edema.   Lymphadenopathy:      Cervical: No cervical adenopathy.   Skin:     General: Skin is warm and dry.   Neurological:      Mental Status: He is alert.      Cranial Nerves: No cranial nerve deficit.      Motor: Weakness present.      Comments: Doesn't really follow commands  for me this am       Vents:  Vent Mode: A/C (03/13/22 0140)  Set Rate: 14 BPM (03/13/22 0140)  Vt Set: 480 mL (03/13/22 0140)  Pressure Support: 12 cmH20 (03/12/22 0750)  PEEP/CPAP: 5 cmH20 (03/13/22 0140)  Oxygen Concentration (%): 35 (03/12/22 1927)  Peak Airway Pressure: 16 cmH2O (03/13/22 0140)  Plateau Pressure: 20 cmH20 (03/07/22 0500)  Total Ve: 10.6 mL (03/13/22 0140)  F/VT Ratio<105 (RSBI): (!) 52.51 (03/12/22 1621)    Lines/Drains/Airways       Peripherally Inserted Central Catheter Line  Duration             PICC Double Lumen 01/05/22 left basilic 67 days              Central Venous Catheter Line  Duration                  Hemodialysis Catheter 12/30/21 0900 right internal jugular 72 days              Drain  Duration                  Colostomy 12/18/21 1030 Descending/sigmoid LUQ 84 days         Gastrostomy/Enterostomy 03/09/22 1436 Percutaneous endoscopic gastrostomy (PEG) midline feeding 3 days              Airway  Duration                  Surgical Airway 01/04/22 Shiley 68 days                    Significant Labs:    CBC/Anemia Profile:  No results for input(s): WBC, HGB, HCT, PLT, MCV, RDW, IRON, FERRITIN, RETIC, FOLATE, [id number], OCCULTBLOOD in the last 48 hours.     Chemistries:  No results for input(s): NA, K, CL, CO2, BUN, CREATININE, CALCIUM, ALBUMIN, PROT, BILITOT, ALKPHOS, ALT, AST, GLUCOSE, MG, PHOS in the last 48 hours.    All pertinent labs within the past 24 hours have been reviewed.    Significant Imaging:  I have reviewed all pertinent imaging results/findings within the past 24 hours.

## 2022-03-13 NOTE — NURSING
Patient has a episode of vomiting it was clear and thick mucus like. Zofran 8 mg given iv push. Will continue to monitor

## 2022-03-13 NOTE — ASSESSMENT & PLAN NOTE
- 12/14 intubated, tracheostomy 1/4  - continuous PSV/CPAP, had to be switched back to AC due to apnea spells

## 2022-03-13 NOTE — PROGRESS NOTES
[facility]  Pulmonology  Progress Note    Patient Name: Og Garcia  MRN: [id number]  Admission Date: 12/23/2021  Hospital Length of Stay: 80 days  Code Status: Prior  Attending Provider: Cecil Abernahty DO  Primary Care Provider: Hector Arndt DNP, FNP-C   Principal Problem: Denice gangrene    Subjective:     Interval History: On control ventilation this am, was having frequent apnea spells per report.     Objective:     Vital Signs (Most Recent):  Temp: 99.6 °F (37.6 °C) (03/13/22 0800)  Pulse: 84 (03/13/22 0800)  Resp: (!) 26 (03/13/22 0800)  BP: (!) 121/58 (03/13/22 0800)  SpO2: 100 % (03/13/22 0800)   Vital Signs (24h Range):  Temp:  [98.8 °F (37.1 °C)-99.6 °F (37.6 °C)] 99.6 °F (37.6 °C)  Pulse:  [] 84  Resp:  [14-29] 26  SpO2:  [96 %-100 %] 100 %  BP: (109-142)/(53-81) 121/58     Weight: 77.9 kg (171 lb 11.8 oz)  Body mass index is 23.95 kg/m².      Intake/Output Summary (Last 24 hours) at 3/13/2022 0929  Last data filed at 3/12/2022 1800  Gross per 24 hour   Intake 600 ml   Output 200 ml   Net 400 ml       Physical Exam  Vitals reviewed.   Constitutional:       General: He is not in acute distress.  HENT:      Head:      Comments: Right temporal deformity     Nose: Nose normal.      Mouth/Throat:      Mouth: Mucous membranes are moist.   Eyes:      Comments: enucleated right eye   Neck:      Comments: Tracheostomy in place  Cardiovascular:      Rate and Rhythm: Normal rate and regular rhythm.      Pulses: Normal pulses.      Heart sounds: Normal heart sounds.   Pulmonary:      Effort: No respiratory distress.      Breath sounds: No stridor. No wheezing, rhonchi or rales.      Comments: Ventilator driven breath sounds bilaterally. Coarse  Abdominal:      Palpations: Abdomen is soft.      Tenderness: There is no guarding.      Comments: PEG   Genitourinary:     Comments: Packing in place  Musculoskeletal:         General: No swelling or deformity.      Cervical back:  Neck supple.      Right lower leg: No edema.      Left lower leg: No edema.   Lymphadenopathy:      Cervical: No cervical adenopathy.   Skin:     General: Skin is warm and dry.   Neurological:      Mental Status: He is alert.      Cranial Nerves: No cranial nerve deficit.      Motor: Weakness present.      Comments: Doesn't really follow commands for me this am       Vents:  Vent Mode: A/C (03/13/22 0140)  Set Rate: 14 BPM (03/13/22 0140)  Vt Set: 480 mL (03/13/22 0140)  Pressure Support: 12 cmH20 (03/12/22 0750)  PEEP/CPAP: 5 cmH20 (03/13/22 0140)  Oxygen Concentration (%): 35 (03/12/22 1927)  Peak Airway Pressure: 16 cmH2O (03/13/22 0140)  Plateau Pressure: 20 cmH20 (03/07/22 0500)  Total Ve: 10.6 mL (03/13/22 0140)  F/VT Ratio<105 (RSBI): (!) 52.51 (03/12/22 1621)    Lines/Drains/Airways       Peripherally Inserted Central Catheter Line  Duration             PICC Double Lumen 01/05/22 left basilic 67 days              Central Venous Catheter Line  Duration                  Hemodialysis Catheter 12/30/21 0900 right internal jugular 72 days              Drain  Duration                  Colostomy 12/18/21 1030 Descending/sigmoid LUQ 84 days         Gastrostomy/Enterostomy 03/09/22 1436 Percutaneous endoscopic gastrostomy (PEG) midline feeding 3 days              Airway  Duration                  Surgical Airway 01/04/22 Shiley 68 days                    Significant Labs:    CBC/Anemia Profile:  No results for input(s): WBC, HGB, HCT, PLT, MCV, RDW, IRON, FERRITIN, RETIC, FOLATE, [id number], OCCULTBLOOD in the last 48 hours.     Chemistries:  No results for input(s): NA, K, CL, CO2, BUN, CREATININE, CALCIUM, ALBUMIN, PROT, BILITOT, ALKPHOS, ALT, AST, GLUCOSE, MG, PHOS in the last 48 hours.    All pertinent labs within the past 24 hours have been reviewed.    Significant Imaging:  I have reviewed all pertinent imaging results/findings within the past 24 hours.    Assessment/Plan:     * Denice gangrene  - s/p  multiple debridements  - wound vac in place  - ID consulted for antibiotic management: He was treated with rocephin, daptomycin, clindamycin. 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 Cazares  2/21- back to OR for skin grafting  3/9- had peg tube placed     ESRD (end stage renal disease)  Started on HD 12/30   Continue with HD per nephrology      Type 2 diabetes mellitus without complication, without long-term current use of insulin  - continue ISS  - glucose stable/controlled      On mechanically assisted ventilation  - 12/14 intubated, tracheostomy 1/4  - continuous PSV/CPAP, had to be switched back to AC due to apnea spells    Hypertension  - continue diltiazem                   Raul aHll MD  Pulmonology  [facility]

## 2022-03-13 NOTE — PLAN OF CARE
Problem: Communication Impairment (Mechanical Ventilation, Invasive)  Goal: Effective Communication  Outcome: Ongoing, Progressing     Problem: Device-Related Complication Risk (Mechanical Ventilation, Invasive)  Goal: Optimal Device Function  Outcome: Ongoing, Progressing     Problem: Inability to Wean (Mechanical Ventilation, Invasive)  Goal: Mechanical Ventilation Liberation  Outcome: Ongoing, Progressing     Problem: Gas Exchange Impaired  Goal: Optimal Gas Exchange  Outcome: Ongoing, Progressing

## 2022-03-14 NOTE — PROGRESS NOTES
[facility]  Adult Nutrition  Follow-up Note         Reason for Assessment  Reason For Assessment: RD follow-up  Nutrition Risk Screen: tube feeding or parenteral nutrition  Malnutrition  Is Patient Malnourished: No  Nutrition Diagnosis  Inadequate oral intake   related to Decreased ability to consume sufficient energy as evidenced by pt on vent    Nutrition Diagnosis Status: Chronic/ continues      Nutrition Risk  Level of Risk/Frequency of Follow-up: high   Chewing or Swallowing Difficulty?: Swallowing difficulty  Estimated/Assessed Needs  RMR ([place]-St. Jeor Equation): 1594.13 Activity Factor: 1 Injury Factor: 1.2   Total Ve: 15.7 mL Temp: 98.7 °F (37.1 °C)Axillary  Weight Used For Calorie Calculations: 79.2 kg (174 lb 9.7 oz)   Energy Need Method: [place] (modified) Energy Calorie Requirements (kcal): 2105  Weight Used For Protein Calculations: 83.3 kg (183 lb 10.3 oz)  Protein Requirements: 100-125  Estimated Fluid Requirement Method: RDA Method Fluid Requirements (mL): 1966  RDA Method (mL): 2105     Nutrition Prescription / Recommendations  Recommendation/Intervention: Continue Tube feeding: Nepro @ 55ml/hr; H20 flush of 150ml q 4hr  Goals: pt will meet estimated nutritional needs; wound healing; TF tolerance  Nutrition Goal Status: progressing towards goal  Communication of RD Recs: reviewed with RN  Current Diet Order: NPO  Nutrition Order Comments: Tube feeding: Nepro @ 55ml/hr; H20 flush of 150ml q 4hr  Current Nutrition Support Formula Ordered: Nepro  Current Nutrition Support Rate Ordered: 55 (ml)  Current Nutrition Support Frequency Ordered: hourly  Recommended Diet: Enteral Nutrition Nepro @ 55ml/hr; H20 flush of 150ml q 4hr  Recommended Oral Supplement: No Oral Supplements  Is Nutrition Support Recommended: No  Is Education Recommended: No  Monitor and Evaluation  % current Intake: Enteral Nutrition progressing to goal  % intake to meet estimated needs: Enteral Nutrition    Food and Nutrient Intake: enteral nutrition intake  Food and Nutrient Adminstration: enteral and parenteral nutrition administration  Anthropometric Measurements: height/length, body mass index, weight, weight change  Biochemical Data, Medical Tests and Procedures: electrolyte and renal panel, glucose/endocrine profile  Nutrition-Focused Physical Findings: skin  Enteral Calories (kcal): 2376  Enteral Protein (gm): 106  Enteral (Free Water) Fluid (mL): 950  Free Water Flush Fluid (mL): 900  Parenteral Calories (kcal): 845  Parenteral Protein (gm): 48  Parenteral Fluid (mL): 960  Lipid Calories (kcals): 500 kcals  Other Calories (kcal): 528 (propofol)  Total Calories (kcal): 2160  Total Calories (kcal/kg): 2612  % Kcal Needs: 100  Total Protein (gm): 135  % Protein Needs: 100  IV Fluid (mL): 1764  Total Fluid Intake (mL): 1850  Energy Calories Required: meeting needs  Protein Required: meeting needs  Fluid Required: meeting needs  Tolerance: tolerating  Current Medical Diagnosis and Past Medical History  Diagnosis: gastrointestinal disease, infection/sepsis  Past Medical History:   Diagnosis Date    Hyperlipidemia     Hypertension      Nutrition/Diet History  Spiritual, Cultural Beliefs, Shinto Practices, Values that Affect Care: no  Food Allergies: NKFA  Factors Affecting Nutritional Intake: None identified at this time  Lab/Procedures/Meds  No results for input(s): NA, K, BUN, CREATININE, GLU, CALCIUM, ALBUMIN, CL, ALT, AST, PHOS in the last 72 hours.  Last A1c: No results found for: HGBA1C  Lab Results   Component Value Date    RBC 2.79 (L) 03/09/2022    HGB 8.0 (L) 03/09/2022    HCT 25.0 (L) 03/09/2022    MCV 89.6 03/09/2022    MCH 28.7 03/09/2022    MCHC 32.0 03/09/2022     Pertinent Labs Reviewed: reviewed  Pertinent Labs Comments: Hct 25.0m Na 131, K 5.3, BUN 76, Cr 4.15, Alb 1.3  Pertinent Medications Reviewed: reviewed  Pertinent Medications Comments: heparin, insulin  Anthropometrics  Temp: 98.7 °F  "(37.1 °C)  Height Method: Stated  Height: 5' 11" (180.3 cm)  Height (inches): 71 in  Weight Method: Bed Scale  Weight: 79.2 kg (174 lb 9.7 oz)  Weight (lb): 174.61 lb  Ideal Body Weight (IBW), Male: 172 lb  % Ideal Body Weight, Male (lb): 106.77 %  BMI (Calculated): 24.4  BMI Grade: 25 - 29.9 - overweight     Nutrition by Nursing  Diet/Nutrition Received: tube feeding     Diet/Feeding Assistance: none  Diet/Feeding Tolerance: good  Last Bowel Movement: 03/13/22  [REMOVED]      NG/OG Tube Bath sump 18 Fr. Left nostril-Feeding Type: continuous, by pump       Gastrostomy/Enterostomy 03/09/22 1436 Percutaneous endoscopic gastrostomy (PEG) midline feeding-Feeding Type: continuous, by pump  [REMOVED]      NG/OG Tube Bath sump 18 Fr. Left nostril-Current Rate (mL/hr): 50 mL/hr       Gastrostomy/Enterostomy 03/09/22 1436 Percutaneous endoscopic gastrostomy (PEG) midline feeding-Current Rate (mL/hr): 50 mL/hr  [REMOVED]      NG/OG Tube Bath sump 18 Fr. Left nostril-Goal Rate (mL/hr): 75 mL/hr       Gastrostomy/Enterostomy 03/09/22 1436 Percutaneous endoscopic gastrostomy (PEG) midline feeding-Goal Rate (mL/hr): 50 mL/hr  [REMOVED]      NG/OG Tube Bath sump 18 Fr. Left nostril-Formula Name: nepro 1.8       Gastrostomy/Enterostomy 03/09/22 1436 Percutaneous endoscopic gastrostomy (PEG) midline feeding-Formula Name: nepro 1.8  Nutrition Follow-Up  RD Follow-up?: Yes  Assessment and Plan  No new Assessment & Plan notes have been filed under this hospital service since the last note was generated.  Service: Nutrition     "

## 2022-03-14 NOTE — PROGRESS NOTES
[facility]  Pulmonology  Progress Note    Patient Name: Og Garcia  MRN: [id number]  Admission Date: 12/23/2021  Hospital Length of Stay: 81 days  Code Status: Prior  Attending Provider: Cecil Abernathy DO  Primary Care Provider: Hector Arndt DNP, FNP-C   Principal Problem: Denice gangrene    Subjective:     Interval History: No acute events overnight. Currently hemodynamically stable. Oxygenating adequately on the ventilator. Currently afebrile. Doing well with breathing trials.     Objective:     Vital Signs (Most Recent):  Temp: 98.7 °F (37.1 °C) (03/14/22 0800)  Pulse: 91 (03/14/22 0800)  Resp: (!) 21 (03/14/22 0800)  BP: 138/68 (03/14/22 0800)  SpO2: 100 % (03/14/22 0800) Vital Signs (24h Range):  Temp:  [98.7 °F (37.1 °C)-99.8 °F (37.7 °C)] 98.7 °F (37.1 °C)  Pulse:  [83-98] 91  Resp:  [15-30] 21  SpO2:  [96 %-100 %] 100 %  BP: (116-149)/(57-71) 138/68     Weight: 79.2 kg (174 lb 9.7 oz)  Body mass index is 24.35 kg/m².      Intake/Output Summary (Last 24 hours) at 3/14/2022 1240  Last data filed at 3/14/2022 0750  Gross per 24 hour   Intake 1200 ml   Output 450 ml   Net 750 ml         Physical Exam  Vitals reviewed.   Constitutional:       General: He is not in acute distress.     Appearance: He is not ill-appearing.      Comments: Chronically ill appearing   HENT:      Head: Normocephalic and atraumatic.      Right Ear: External ear normal.      Left Ear: External ear normal.      Nose: Nose normal.      Mouth/Throat:      Mouth: Mucous membranes are moist.      Comments: trach  Eyes:      Conjunctiva/sclera: Conjunctivae normal.      Pupils: Pupils are equal, round, and reactive to light.   Cardiovascular:      Rate and Rhythm: Normal rate and regular rhythm.      Pulses: Normal pulses.      Heart sounds: Normal heart sounds.   Pulmonary:      Effort: No respiratory distress.      Comments: clear breath sounds anteriorly  Abdominal:      Palpations: Abdomen is soft.    Musculoskeletal:         General: Normal range of motion.      Cervical back: Neck supple.   Lymphadenopathy:      Cervical: No cervical adenopathy.   Skin:     General: Skin is warm and dry.      Coloration: Skin is not pale.   Neurological:      Mental Status: Mental status is at baseline.      Comments: Moves spontaneously, tracks with good eye   Psychiatric:         Behavior: Behavior normal.       Vents:  Vent Mode: CPAP/PSV (03/14/22 0314)  Set Rate: 0 BPM (03/14/22 0314)  Vt Set: 0 mL (03/14/22 0314)  Pressure Support: 12 cmH20 (03/14/22 0314)  PEEP/CPAP: 5 cmH20 (03/14/22 0314)  Oxygen Concentration (%): 35 (03/14/22 0750)  Peak Airway Pressure: 18 cmH2O (03/14/22 0314)  Plateau Pressure: 20 cmH20 (03/07/22 0500)  Total Ve: 15.7 mL (03/14/22 0314)  F/VT Ratio<105 (RSBI): (!) 47.73 (03/13/22 1540)    Lines/Drains/Airways       Peripherally Inserted Central Catheter Line  Duration             PICC Double Lumen 01/05/22 left basilic 68 days              Central Venous Catheter Line  Duration                  Hemodialysis Catheter 12/30/21 0900 right internal jugular 74 days              Drain  Duration                  Colostomy 12/18/21 1030 Descending/sigmoid LUQ 86 days         Gastrostomy/Enterostomy 03/09/22 1436 Percutaneous endoscopic gastrostomy (PEG) midline feeding 4 days              Airway  Duration                  Surgical Airway 01/04/22 Shiley 69 days                    Significant Labs:    CBC/Anemia Profile:  No results for input(s): WBC, HGB, HCT, PLT, MCV, RDW, IRON, FERRITIN, RETIC, FOLATE, [id number], OCCULTBLOOD in the last 48 hours.       Chemistries:  No results for input(s): NA, K, CL, CO2, BUN, CREATININE, CALCIUM, ALBUMIN, PROT, BILITOT, ALKPHOS, ALT, AST, GLUCOSE, MG, PHOS in the last 48 hours.      All pertinent labs within the past 24 hours have been reviewed.    Significant Imaging:  I have reviewed all pertinent imaging results/findings within the past 24  hours.    Assessment/Plan:     * Denice gangrene  - s/p multiple debridements  - wound vac in place  - ID consulted for antibiotic management: He was treated with rocephin, daptomycin, clindamycin. 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 Cazares  2/21- back to OR for skin grafting  3/9- had peg tube placed     Fever  2/26 -Tmax 101.4 -- resp culture - with GNB - will start Zosyn today - follow cultures   BC x 2 NGTD   2/28 - resp culture with pseudomonas and klebsiella  treat with zosyn for 7 days   Blood culture from 2/25 also growing Pseudomonas. Sensitive to zosyn. Will need to continue zosyn for at least 10 days total  3/9 He has completed antibiotics    ESRD (end stage renal disease)  Started on HD 12/30   Continue with HD per nephrology      Type 2 diabetes mellitus without complication, without long-term current use of insulin  - continue ISS  - glucose stable/controlled      Acute blood loss anemia  Has required multiple transfusions this admission    Most recent Hgb is 8.0 -    On mechanically assisted ventilation  - 12/14 intubated, tracheostomy 1/4  - continuous PSV/CPAP, had to be switched back to AC due to apnea spells  3/14- on cpap this am and doing well. Will try for continuous cpap again. Only place back on assist control if apnea causes drop in sat    Hypertension  - continue diltiazem  - bp looks ok                   Cecil Abernathy, DO  Pulmonology  [facility]

## 2022-03-14 NOTE — SUBJECTIVE & OBJECTIVE
Interval History: No acute events overnight. Currently hemodynamically stable. Oxygenating adequately on the ventilator. Currently afebrile. Doing well with breathing trials.     Objective:     Vital Signs (Most Recent):  Temp: 98.7 °F (37.1 °C) (03/14/22 0800)  Pulse: 91 (03/14/22 0800)  Resp: (!) 21 (03/14/22 0800)  BP: 138/68 (03/14/22 0800)  SpO2: 100 % (03/14/22 0800) Vital Signs (24h Range):  Temp:  [98.7 °F (37.1 °C)-99.8 °F (37.7 °C)] 98.7 °F (37.1 °C)  Pulse:  [83-98] 91  Resp:  [15-30] 21  SpO2:  [96 %-100 %] 100 %  BP: (116-149)/(57-71) 138/68     Weight: 79.2 kg (174 lb 9.7 oz)  Body mass index is 24.35 kg/m².      Intake/Output Summary (Last 24 hours) at 3/14/2022 1240  Last data filed at 3/14/2022 0750  Gross per 24 hour   Intake 1200 ml   Output 450 ml   Net 750 ml         Physical Exam  Vitals reviewed.   Constitutional:       General: He is not in acute distress.     Appearance: He is not ill-appearing.      Comments: Chronically ill appearing   HENT:      Head: Normocephalic and atraumatic.      Right Ear: External ear normal.      Left Ear: External ear normal.      Nose: Nose normal.      Mouth/Throat:      Mouth: Mucous membranes are moist.      Comments: trach  Eyes:      Conjunctiva/sclera: Conjunctivae normal.      Pupils: Pupils are equal, round, and reactive to light.   Cardiovascular:      Rate and Rhythm: Normal rate and regular rhythm.      Pulses: Normal pulses.      Heart sounds: Normal heart sounds.   Pulmonary:      Effort: No respiratory distress.      Comments: clear breath sounds anteriorly  Abdominal:      Palpations: Abdomen is soft.   Musculoskeletal:         General: Normal range of motion.      Cervical back: Neck supple.   Lymphadenopathy:      Cervical: No cervical adenopathy.   Skin:     General: Skin is warm and dry.      Coloration: Skin is not pale.   Neurological:      Mental Status: Mental status is at baseline.      Comments: Moves spontaneously, tracks with good eye    Psychiatric:         Behavior: Behavior normal.       Vents:  Vent Mode: CPAP/PSV (03/14/22 0314)  Set Rate: 0 BPM (03/14/22 0314)  Vt Set: 0 mL (03/14/22 0314)  Pressure Support: 12 cmH20 (03/14/22 0314)  PEEP/CPAP: 5 cmH20 (03/14/22 0314)  Oxygen Concentration (%): 35 (03/14/22 0750)  Peak Airway Pressure: 18 cmH2O (03/14/22 0314)  Plateau Pressure: 20 cmH20 (03/07/22 0500)  Total Ve: 15.7 mL (03/14/22 0314)  F/VT Ratio<105 (RSBI): (!) 47.73 (03/13/22 1540)    Lines/Drains/Airways       Peripherally Inserted Central Catheter Line  Duration             PICC Double Lumen 01/05/22 left basilic 68 days              Central Venous Catheter Line  Duration                  Hemodialysis Catheter 12/30/21 0900 right internal jugular 74 days              Drain  Duration                  Colostomy 12/18/21 1030 Descending/sigmoid LUQ 86 days         Gastrostomy/Enterostomy 03/09/22 1436 Percutaneous endoscopic gastrostomy (PEG) midline feeding 4 days              Airway  Duration                  Surgical Airway 01/04/22 Shiley 69 days                    Significant Labs:    CBC/Anemia Profile:  No results for input(s): WBC, HGB, HCT, PLT, MCV, RDW, IRON, FERRITIN, RETIC, FOLATE, [id number], OCCULTBLOOD in the last 48 hours.       Chemistries:  No results for input(s): NA, K, CL, CO2, BUN, CREATININE, CALCIUM, ALBUMIN, PROT, BILITOT, ALKPHOS, ALT, AST, GLUCOSE, MG, PHOS in the last 48 hours.      All pertinent labs within the past 24 hours have been reviewed.    Significant Imaging:  I have reviewed all pertinent imaging results/findings within the past 24 hours.

## 2022-03-14 NOTE — ASSESSMENT & PLAN NOTE
- 12/14 intubated, tracheostomy 1/4  - continuous PSV/CPAP, had to be switched back to AC due to apnea spells  3/14- on cpap this am and doing well. Will try for continuous cpap again. Only place back on assist control if apnea causes drop in sat

## 2022-03-14 NOTE — DIALYSIS ROUNDING
"          Nephrology Department            NAME: Og Wolf Garcia   YOB: 1955  MRN: [id number]  NOTE DATE: 03/14/2022       The patient is seen on dialysis in [facility].  He is tolerating the procedure.    Patient complains:  None.      REVIEW OF SYSTEMS:  he reports no shortness of breath, nausea or vomiting. No acute changes.    VITALS:  height is 5' 11" (1.803 m) and weight is 79.2 kg (174 lb 9.7 oz). His axillary temperature is 98.8 °F (37.1 °C). His blood pressure is 137/66 and his pulse is 95. His respiration is 16 and oxygen saturation is 99%.  Hemodialysis documentation flowsheet reviewed with dialysis nurse, including weight and vitals.    Resting comfortably, NAD.  Respiration unlabored. Lungs clear. Trach in place  Heart regular, no rub.  Abdomen soft, nontender, positive bowl sounds  No edema.    Recent Labs   Lab 03/08/22  0604 03/09/22  0634   * 131*   K 5.1 5.3*   CL 95* 94*   CO2 23 23   BUN 60* 76*   CREATININE 3.21* 4.15*   CALCIUM 8.6 8.8     Recent Labs   Lab 03/08/22  0604 03/09/22  0634   WBC 18.81* 19.67*   HGB 8.5* 8.0*   HCT 26.6* 25.0*   * 398       ASSESSMENT/PLAN:  Continue with scheduled hemodialysis for this patient.    Edwin Pryor Jr, MD  "

## 2022-03-15 NOTE — PROGRESS NOTES
[facility]  Nephrology  Progress Note    Patient Name: Og Garcia  MRN: [id number]  Admission Date: 12/23/2021  Hospital Length of Stay: 82 days  Attending Provider: Cecil Abernathy DO   Primary Care Physician: Hector Arndt DNP, FNP-C  Principal Problem:Denice gangrene    Subjective:     HPI: The patient is known from the previous nephrology consult at [facility].  He is no at Specialty and continues to need dialysis support.      Interval History: The patient is resting.  No acute changes.     Review of patient's allergies indicates:  No Known Allergies  Current Facility-Administered Medications   Medication Frequency    0.9%  NaCl infusion (for blood administration) Q24H PRN    0.9%  NaCl infusion PRN    acetaminophen tablet 500 mg Q6H PRN    acetic acid 0.25 % irrigation PRN    albuterol nebulizer solution 2.5 mg Q4H PRN    bisacodyL EC tablet 10 mg Daily PRN    calcium gluconate 1 g in dextrose 5 % in water (D5W) 5 % 100 mL IVPB (MB+) Once    dextromethorphan-guaiFENesin  mg/5 ml liquid 10 mL Q6H PRN    dextrose 50% injection 12.5 g PRN    diltiaZEM tablet 60 mg Q6H    fentaNYL 50 mcg/hr 1 patch Q72H    glucagon (human recombinant) injection 1 mg PRN    guaiFENesin 100 mg/5 ml syrup 200 mg Q6H    heparin (porcine) injection 4,000 Units PRN    insulin aspart U-100 injection 1-10 Units Q6H    lorazepam injection 2 mg Q6H PRN    ondansetron injection 8 mg Q6H PRN    pantoprazole suspension 40 mg Daily    sevelamer carbonate pwpk 0.8 g TID WM    silver sulfADIAZINE 1% cream PRN    simethicone chewable tablet 80 mg TID PRN    ticagrelor tablet 90 mg BID       Objective:     Vital Signs (Most Recent):  Temp: 99.6 °F (37.6 °C) (03/15/22 1600)  Pulse: 86 (03/15/22 1600)  Resp: 14 (03/15/22 1600)  BP: (!) 113/52 (03/15/22 1600)  SpO2: 100 % (03/15/22 1600)  O2 Device (Oxygen Therapy): ventilator (03/15/22 9800)   Vital Signs (24h Range):  Temp:  [98.9 °F (37.2  °C)-100 °F (37.8 °C)] 99.6 °F (37.6 °C)  Pulse:  [86-98] 86  Resp:  [11-30] 14  SpO2:  [98 %-100 %] 100 %  BP: ()/(26-64) 113/52     Weight: 81.6 kg (179 lb 14.3 oz) (03/15/22 0600)  Body mass index is 25.09 kg/m².  Body surface area is 2.02 meters squared.    I/O last 3 completed shifts:  In: 2450 [Other:500; NG/GT:1950]  Out: 2750 [Other:2500; Stool:250]    Physical Exam  Vitals reviewed.   HENT:      Head: Atraumatic.   Cardiovascular:      Rate and Rhythm: Regular rhythm.      Heart sounds: Normal heart sounds.   Pulmonary:      Effort: Pulmonary effort is normal.      Breath sounds: Normal breath sounds.   Abdominal:      Palpations: Abdomen is soft.   Neurological:      Mental Status: He is alert.       Significant Labs:  BMP:   Recent Labs   Lab 03/09/22  0634      *   K 5.3*   CL 94*   CO2 23   BUN 76*   CREATININE 4.15*   CALCIUM 8.8     CBC:   Recent Labs   Lab 03/09/22  0634   WBC 19.67*   RBC 2.79*   HGB 8.0*   HCT 25.0*      MCV 89.6   MCH 28.7   MCHC 32.0        Significant Imaging:  Labs: Reviewed    Assessment/Plan:     * Denice gangrene  Continue wound care  Wound care.  Continue with wound care    ESRD (end stage renal disease)  Dialysis not required today   3/7/2022 Continue with scheduled hemodialysis on MWF  3/8/2022 Dialysis session is going well.  3/10/2022 Continue with dialysis support.  3/11/2022 Dialysis as scheduled.  3/15/2022 Continue with dialysis.        Thank you for your consult. I will follow-up with patient. Please contact us if you have any additional questions.    Edwin Pryor Jr, MD  Nephrology  [facility] \[facility]\""

## 2022-03-15 NOTE — SUBJECTIVE & OBJECTIVE
Interval History: The patient is resting.  No acute changes.     Review of patient's allergies indicates:  No Known Allergies  Current Facility-Administered Medications   Medication Frequency    0.9%  NaCl infusion (for blood administration) Q24H PRN    0.9%  NaCl infusion PRN    acetaminophen tablet 500 mg Q6H PRN    acetic acid 0.25 % irrigation PRN    albuterol nebulizer solution 2.5 mg Q4H PRN    bisacodyL EC tablet 10 mg Daily PRN    calcium gluconate 1 g in dextrose 5 % in water (D5W) 5 % 100 mL IVPB (MB+) Once    dextromethorphan-guaiFENesin  mg/5 ml liquid 10 mL Q6H PRN    dextrose 50% injection 12.5 g PRN    diltiaZEM tablet 60 mg Q6H    fentaNYL 50 mcg/hr 1 patch Q72H    glucagon (human recombinant) injection 1 mg PRN    guaiFENesin 100 mg/5 ml syrup 200 mg Q6H    heparin (porcine) injection 4,000 Units PRN    insulin aspart U-100 injection 1-10 Units Q6H    lorazepam injection 2 mg Q6H PRN    ondansetron injection 8 mg Q6H PRN    pantoprazole suspension 40 mg Daily    sevelamer carbonate pwpk 0.8 g TID WM    silver sulfADIAZINE 1% cream PRN    simethicone chewable tablet 80 mg TID PRN    ticagrelor tablet 90 mg BID       Objective:     Vital Signs (Most Recent):  Temp: 99.6 °F (37.6 °C) (03/15/22 1600)  Pulse: 86 (03/15/22 1600)  Resp: 14 (03/15/22 1600)  BP: (!) 113/52 (03/15/22 1600)  SpO2: 100 % (03/15/22 1600)  O2 Device (Oxygen Therapy): ventilator (03/15/22 1730)   Vital Signs (24h Range):  Temp:  [98.9 °F (37.2 °C)-100 °F (37.8 °C)] 99.6 °F (37.6 °C)  Pulse:  [86-98] 86  Resp:  [11-30] 14  SpO2:  [98 %-100 %] 100 %  BP: ()/(26-64) 113/52     Weight: 81.6 kg (179 lb 14.3 oz) (03/15/22 0600)  Body mass index is 25.09 kg/m².  Body surface area is 2.02 meters squared.    I/O last 3 completed shifts:  In: 2450 [Other:500; NG/GT:1950]  Out: 2750 [Other:2500; Stool:250]    Physical Exam  Vitals reviewed.   HENT:      Head: Atraumatic.   Cardiovascular:      Rate and Rhythm: Regular rhythm.       Heart sounds: Normal heart sounds.   Pulmonary:      Effort: Pulmonary effort is normal.      Breath sounds: Normal breath sounds.   Abdominal:      Palpations: Abdomen is soft.   Neurological:      Mental Status: He is alert.       Significant Labs:  BMP:   Recent Labs   Lab 03/09/22  0634      *   K 5.3*   CL 94*   CO2 23   BUN 76*   CREATININE 4.15*   CALCIUM 8.8     CBC:   Recent Labs   Lab 03/09/22  0634   WBC 19.67*   RBC 2.79*   HGB 8.0*   HCT 25.0*      MCV 89.6   MCH 28.7   MCHC 32.0        Significant Imaging:  Labs: Reviewed

## 2022-03-15 NOTE — PROGRESS NOTES
[facility]   Wound Care  Progress Note    Patient Name: Og Garcia  MRN: [id number]  Admission Date: 12/23/2021  Attending Physician: Cecil Abernathy DO    Chief Complaint:  Wounds to abdomen, sacral, left lateral lower leg, right anterior foot, right hand, left posterior heel, and right lateral and medial heel    Past Medical History:   Diagnosis Date    Hyperlipidemia     Hypertension         Subjective:     HPI:  Og Garcia is a 66 y.o. male with wounds to abdomen, sacral, left lateral lower leg, right anterior foot, right hand, Left posterior heel, and right lateral and medial foot. Skin graft on abdomen intact and healing well. Sacral wound continues to worsen, wound is necrotic with moderate amount of green drainage. Wounds on feet and heels also have necrotic tissue. Currently treating wounds with silvadene.     Review of Systems   Unable to perform ROS: Acuity of condition     Objective:     Vital Signs (Most Recent):  Temp: 99 °F (37.2 °C) (03/15/22 0800)  Pulse: 89 (03/15/22 1000)  Resp: (!) 21 (03/15/22 1000)  BP: (!) 107/51 (03/15/22 1000)  SpO2: 100 % (03/15/22 1000) Vital Signs (24h Range):  Temp:  [98.5 °F (36.9 °C)-100 °F (37.8 °C)] 99 °F (37.2 °C)  Pulse:  [] 89  Resp:  [13-28] 21  SpO2:  [94 %-100 %] 100 %  BP: ()/(43-69) 107/51     Weight: 81.6 kg (179 lb 14.3 oz)  Body mass index is 25.09 kg/m².    Physical Exam  Vitals reviewed.   Constitutional:       Appearance: He is ill-appearing.   HENT:      Head: Normocephalic.   Cardiovascular:      Rate and Rhythm: Normal rate and regular rhythm.      Pulses: Normal pulses.      Heart sounds: Normal heart sounds.   Pulmonary:      Comments: Intubated  Skin:     Findings: Erythema present.      Comments: Wound, see wound assessment and pictures   Neurological:      Mental Status: He is alert. Mental status is at baseline.      Motor: Weakness present.          Assessment and Plan       Assessment:  1. Abdominal wound/Skin  graft  2. Right thigh donor site  3. Sacral wound  4.  Lower extremity wounds     Plan:   1. Abdomen-continue mepitel  2. Apply silvadene to all other wounds. Consult General Surgery to evaluate for debridement. Discussed with Dr. Garcia, patient is stable, debridement is not emergent, will monitor wound this week and have surgery evaluate if not improvement.   3. Turn every 2 hours, change dressing daily and PRN for soilage, keep pressure off sacral and heels.         Signature:  HERSON Viveros  Wound Care    Date of encounter: 3-

## 2022-03-15 NOTE — SUBJECTIVE & OBJECTIVE
Interval History: No acute events overnight. Currently hemodynamically stable. Oxygenating adequately on the ventilator. Currently afebrile. Doing well with breathing trials.     Objective:     Vital Signs (Most Recent):  Temp: 99 °F (37.2 °C) (03/15/22 0800)  Pulse: 89 (03/15/22 1000)  Resp: (!) 21 (03/15/22 1000)  BP: (!) 107/51 (03/15/22 1000)  SpO2: 100 % (03/15/22 1000) Vital Signs (24h Range):  Temp:  [98.5 °F (36.9 °C)-100 °F (37.8 °C)] 99 °F (37.2 °C)  Pulse:  [] 89  Resp:  [13-28] 21  SpO2:  [94 %-100 %] 100 %  BP: ()/(43-67) 107/51     Weight: 81.6 kg (179 lb 14.3 oz)  Body mass index is 25.09 kg/m².      Intake/Output Summary (Last 24 hours) at 3/15/2022 1156  Last data filed at 3/15/2022 0600  Gross per 24 hour   Intake 1250 ml   Output 2500 ml   Net -1250 ml         Physical Exam  Vitals reviewed.   Constitutional:       General: He is not in acute distress.     Appearance: He is not ill-appearing.      Comments: Chronically ill appearing   HENT:      Head: Normocephalic and atraumatic.      Right Ear: External ear normal.      Left Ear: External ear normal.      Nose: Nose normal.      Mouth/Throat:      Mouth: Mucous membranes are moist.      Comments: trach  Eyes:      Conjunctiva/sclera: Conjunctivae normal.      Pupils: Pupils are equal, round, and reactive to light.   Cardiovascular:      Rate and Rhythm: Normal rate and regular rhythm.      Pulses: Normal pulses.      Heart sounds: Normal heart sounds.   Pulmonary:      Effort: No respiratory distress.      Comments: clear breath sounds anteriorly  Abdominal:      Palpations: Abdomen is soft.   Musculoskeletal:         General: Normal range of motion.      Cervical back: Neck supple.   Lymphadenopathy:      Cervical: No cervical adenopathy.   Skin:     General: Skin is warm and dry.      Coloration: Skin is not pale.   Neurological:      Mental Status: Mental status is at baseline.      Comments: Moves spontaneously, tracks with good  eye   Psychiatric:         Behavior: Behavior normal.       Vents:  Vent Mode: CPAP/PSV (03/15/22 0520)  Set Rate: 0 BPM (03/14/22 0314)  Vt Set: 0 mL (03/14/22 0314)  Pressure Support: 12 cmH20 (03/15/22 0520)  PEEP/CPAP: 5 cmH20 (03/15/22 0520)  Oxygen Concentration (%): 35 (03/15/22 0600)  Peak Airway Pressure: 18 cmH2O (03/15/22 0520)  Plateau Pressure: 20 cmH20 (03/07/22 0500)  Total Ve: 6.2 mL (03/15/22 0520)  F/VT Ratio<105 (RSBI): (!) 50.28 (03/15/22 0520)    Lines/Drains/Airways       Peripherally Inserted Central Catheter Line  Duration             PICC Double Lumen 01/05/22 left basilic 69 days              Central Venous Catheter Line  Duration                  Hemodialysis Catheter 12/30/21 0900 right internal jugular 75 days              Drain  Duration                  Colostomy 12/18/21 1030 Descending/sigmoid LUQ 87 days         Gastrostomy/Enterostomy 03/09/22 1436 Percutaneous endoscopic gastrostomy (PEG) midline feeding 5 days              Airway  Duration                  Surgical Airway 01/04/22 Shiley 70 days                    Significant Labs:    CBC/Anemia Profile:  No results for input(s): WBC, HGB, HCT, PLT, MCV, RDW, IRON, FERRITIN, RETIC, FOLATE, [id number], OCCULTBLOOD in the last 48 hours.       Chemistries:  No results for input(s): NA, K, CL, CO2, BUN, CREATININE, CALCIUM, ALBUMIN, PROT, BILITOT, ALKPHOS, ALT, AST, GLUCOSE, MG, PHOS in the last 48 hours.      All pertinent labs within the past 24 hours have been reviewed.    Significant Imaging:  I have reviewed all pertinent imaging results/findings within the past 24 hours.

## 2022-03-15 NOTE — PROGRESS NOTES
[facility]\""  Pulmonology  Progress Note    Patient Name: Og Garcia  MRN: [id number]  Admission Date: 12/23/2021  Hospital Length of Stay: 82 days  Code Status: Prior  Attending Provider: Cecil Abernathy DO  Primary Care Provider: Hector Arndt DNP, FNP-C   Principal Problem: Denice gangrene    Subjective:     Interval History: No acute events overnight. Currently hemodynamically stable. Oxygenating adequately on the ventilator. Currently afebrile. Doing well with breathing trials.     Objective:     Vital Signs (Most Recent):  Temp: 99 °F (37.2 °C) (03/15/22 0800)  Pulse: 89 (03/15/22 1000)  Resp: (!) 21 (03/15/22 1000)  BP: (!) 107/51 (03/15/22 1000)  SpO2: 100 % (03/15/22 1000) Vital Signs (24h Range):  Temp:  [98.5 °F (36.9 °C)-100 °F (37.8 °C)] 99 °F (37.2 °C)  Pulse:  [] 89  Resp:  [13-28] 21  SpO2:  [94 %-100 %] 100 %  BP: ()/(43-67) 107/51     Weight: 81.6 kg (179 lb 14.3 oz)  Body mass index is 25.09 kg/m².      Intake/Output Summary (Last 24 hours) at 3/15/2022 1156  Last data filed at 3/15/2022 0600  Gross per 24 hour   Intake 1250 ml   Output 2500 ml   Net -1250 ml         Physical Exam  Vitals reviewed.   Constitutional:       General: He is not in acute distress.     Appearance: He is not ill-appearing.      Comments: Chronically ill appearing   HENT:      Head: Normocephalic and atraumatic.      Right Ear: External ear normal.      Left Ear: External ear normal.      Nose: Nose normal.      Mouth/Throat:      Mouth: Mucous membranes are moist.      Comments: trach  Eyes:      Conjunctiva/sclera: Conjunctivae normal.      Pupils: Pupils are equal, round, and reactive to light.   Cardiovascular:      Rate and Rhythm: Normal rate and regular rhythm.      Pulses: Normal pulses.      Heart sounds: Normal heart sounds.   Pulmonary:      Effort: No respiratory distress.      Comments: clear breath sounds anteriorly  Abdominal:      Palpations: Abdomen is soft.    Musculoskeletal:         General: Normal range of motion.      Cervical back: Neck supple.   Lymphadenopathy:      Cervical: No cervical adenopathy.   Skin:     General: Skin is warm and dry.      Coloration: Skin is not pale.   Neurological:      Mental Status: Mental status is at baseline.      Comments: Moves spontaneously, tracks with good eye   Psychiatric:         Behavior: Behavior normal.       Vents:  Vent Mode: CPAP/PSV (03/15/22 0520)  Set Rate: 0 BPM (03/14/22 0314)  Vt Set: 0 mL (03/14/22 0314)  Pressure Support: 12 cmH20 (03/15/22 0520)  PEEP/CPAP: 5 cmH20 (03/15/22 0520)  Oxygen Concentration (%): 35 (03/15/22 0600)  Peak Airway Pressure: 18 cmH2O (03/15/22 0520)  Plateau Pressure: 20 cmH20 (03/07/22 0500)  Total Ve: 6.2 mL (03/15/22 0520)  F/VT Ratio<105 (RSBI): (!) 50.28 (03/15/22 0520)    Lines/Drains/Airways       Peripherally Inserted Central Catheter Line  Duration             PICC Double Lumen 01/05/22 left basilic 69 days              Central Venous Catheter Line  Duration                  Hemodialysis Catheter 12/30/21 0900 right internal jugular 75 days              Drain  Duration                  Colostomy 12/18/21 1030 Descending/sigmoid LUQ 87 days         Gastrostomy/Enterostomy 03/09/22 1436 Percutaneous endoscopic gastrostomy (PEG) midline feeding 5 days              Airway  Duration                  Surgical Airway 01/04/22 Shiley 70 days                    Significant Labs:    CBC/Anemia Profile:  No results for input(s): WBC, HGB, HCT, PLT, MCV, RDW, IRON, FERRITIN, RETIC, FOLATE, [id number], OCCULTBLOOD in the last 48 hours.       Chemistries:  No results for input(s): NA, K, CL, CO2, BUN, CREATININE, CALCIUM, ALBUMIN, PROT, BILITOT, ALKPHOS, ALT, AST, GLUCOSE, MG, PHOS in the last 48 hours.      All pertinent labs within the past 24 hours have been reviewed.    Significant Imaging:  I have reviewed all pertinent imaging results/findings within the past 24  hours.    Assessment/Plan:     * Denice gangrene  - s/p multiple debridements  - wound vac in place  - ID consulted for antibiotic management: He was treated with rocephin, daptomycin, clindamycin. 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 Cazares  2/21- back to OR for skin grafting  3/9- had peg tube placed     Fever  2/26 -Tmax 101.4 -- resp culture - with GNB - will start Zosyn today - follow cultures   BC x 2 NGTD   2/28 - resp culture with pseudomonas and klebsiella  treat with zosyn for 7 days   Blood culture from 2/25 also growing Pseudomonas. Sensitive to zosyn. Will need to continue zosyn for at least 10 days total  3/9 He has completed antibiotics    ESRD (end stage renal disease)  Started on HD 12/30   Continue with HD per nephrology      Type 2 diabetes mellitus without complication, without long-term current use of insulin  - continue ISS  - glucose stable/controlled      Acute blood loss anemia  Has required multiple transfusions this admission    Most recent Hgb is 8.0 -    On mechanically assisted ventilation  - 12/14 intubated, tracheostomy 1/4  - continuous PSV/CPAP, had to be switched back to AC due to apnea spells  3/14- on cpap this am and doing well. Will try for continuous cpap again. Only place back on assist control if apnea causes drop in sat  3/15- doing well and no issues overnight. Hopefully can try some trach collar in the next couple of days    Hypertension  - continue diltiazem  - bp looks ok                   Cecil Abernathy, DO  Pulmonology  [facility]

## 2022-03-15 NOTE — ASSESSMENT & PLAN NOTE
- 12/14 intubated, tracheostomy 1/4  - continuous PSV/CPAP, had to be switched back to AC due to apnea spells  3/14- on cpap this am and doing well. Will try for continuous cpap again. Only place back on assist control if apnea causes drop in sat  3/15- doing well and no issues overnight. Hopefully can try some trach collar in the next couple of days

## 2022-03-15 NOTE — PLAN OF CARE
Problem: Adult Inpatient Plan of Care  Goal: Plan of Care Review  Outcome: Ongoing, Progressing  Goal: Patient-Specific Goal (Individualized)  Outcome: Ongoing, Progressing  Goal: Absence of Hospital-Acquired Illness or Injury  Outcome: Ongoing, Progressing  Goal: Optimal Comfort and Wellbeing  Outcome: Ongoing, Progressing     Problem: Communication Impairment (Mechanical Ventilation, Invasive)  Goal: Effective Communication  Outcome: Ongoing, Progressing     Problem: Device-Related Complication Risk (Mechanical Ventilation, Invasive)  Goal: Optimal Device Function  Outcome: Ongoing, Progressing     Problem: Inability to Wean (Mechanical Ventilation, Invasive)  Goal: Mechanical Ventilation Liberation  Outcome: Ongoing, Progressing     Problem: Nutrition Impairment (Mechanical Ventilation, Invasive)  Goal: Optimal Nutrition Delivery  Outcome: Ongoing, Progressing     Problem: Skin and Tissue Injury (Mechanical Ventilation, Invasive)  Goal: Absence of Device-Related Skin and Tissue Injury  Outcome: Ongoing, Progressing     Problem: Ventilator-Induced Lung Injury (Mechanical Ventilation, Invasive)  Goal: Absence of Ventilator-Induced Lung Injury  Outcome: Ongoing, Progressing     Problem: Communication Impairment (Artificial Airway)  Goal: Effective Communication  Outcome: Ongoing, Progressing     Problem: Device-Related Complication Risk (Artificial Airway)  Goal: Optimal Device Function  Outcome: Ongoing, Progressing     Problem: Skin and Tissue Injury (Artificial Airway)  Goal: Absence of Device-Related Skin or Tissue Injury  Outcome: Ongoing, Progressing     Problem: Gas Exchange Impaired  Goal: Optimal Gas Exchange  Outcome: Ongoing, Progressing

## 2022-03-16 NOTE — NURSING
Patient Respiratory rate increased to 51 and patients heart rate decreased to 42. Patient changed from CPAP to A/C on ventilator. Patient suctioned. HR increased to 90 and RR decreased to 22. O2 is 99 percent.

## 2022-03-16 NOTE — PROGRESS NOTES
[facility]  Nephrology  Progress Note    Patient Name: Og Garcia  MRN: [id number]  Admission Date: 12/23/2021  Hospital Length of Stay: 83 days  Attending Provider: Cecil Abernathy DO   Primary Care Physician: Hector Arndt DNP, FNP-C  Principal Problem:Denice gangrene    Subjective:     HPI: The patient is known from the previous nephrology consult at [facility].  He is no at Specialty and continues to need dialysis support.      Interval History: The is finishing up dialysis.  He has continued to do acceptable.    Review of patient's allergies indicates:  No Known Allergies  Current Facility-Administered Medications   Medication Frequency    0.9%  NaCl infusion (for blood administration) Q24H PRN    0.9%  NaCl infusion PRN    acetaminophen tablet 500 mg Q6H PRN    acetic acid 0.25 % irrigation PRN    albuterol nebulizer solution 2.5 mg Q4H PRN    bisacodyL EC tablet 10 mg Daily PRN    calcium gluconate 1 g in dextrose 5 % in water (D5W) 5 % 100 mL IVPB (MB+) Once    dextromethorphan-guaiFENesin  mg/5 ml liquid 10 mL Q6H PRN    dextrose 50% injection 12.5 g PRN    diltiaZEM tablet 60 mg Q6H    fentaNYL 50 mcg/hr 1 patch Q72H    glucagon (human recombinant) injection 1 mg PRN    guaiFENesin 100 mg/5 ml syrup 200 mg Q6H    heparin (porcine) injection 4,000 Units PRN    insulin aspart U-100 injection 1-10 Units Q6H    lorazepam injection 2 mg Q6H PRN    ondansetron injection 8 mg Q6H PRN    pantoprazole suspension 40 mg Daily    sevelamer carbonate pwpk 0.8 g TID WM    silver sulfADIAZINE 1% cream PRN    simethicone chewable tablet 80 mg TID PRN    ticagrelor tablet 90 mg BID       Objective:     Vital Signs (Most Recent):  Temp: 99 °F (37.2 °C) (03/16/22 1200)  Pulse: 86 (03/16/22 1200)  Resp: (!) 23 (03/16/22 1200)  BP: (!) 111/56 (03/16/22 1200)  SpO2: 100 % (03/16/22 1200)  O2 Device (Oxygen Therapy): ventilator (03/16/22 0828)   Vital Signs (24h  Range):  Temp:  [99 °F (37.2 °C)-100.3 °F (37.9 °C)] 99 °F (37.2 °C)  Pulse:  [83-92] 86  Resp:  [11-36] 23  SpO2:  [98 %-100 %] 100 %  BP: ()/(48-69) 111/56     Weight: 79.9 kg (176 lb 2.4 oz) (03/16/22 0600)  Body mass index is 24.57 kg/m².  Body surface area is 2 meters squared.    I/O last 3 completed shifts:  In: 1710 [NG/GT:1710]  Out: 75 [Stool:75]    Physical Exam  Vitals reviewed.   HENT:      Head: Atraumatic.   Cardiovascular:      Rate and Rhythm: Regular rhythm.      Heart sounds: Normal heart sounds.   Pulmonary:      Effort: Pulmonary effort is normal.      Comments: On trach collar  Abdominal:      Palpations: Abdomen is soft.      Comments: Peg tube in place   Neurological:      Mental Status: He is alert.       Significant Labs:  BMP: No results for input(s): GLU, NA, K, CL, CO2, BUN, CREATININE, CALCIUM, MG in the last 168 hours.  CBC: No results for input(s): WBC, RBC, HGB, HCT, PLT, MCV, MCH, MCHC in the last 168 hours.     Significant Imaging:  Labs: Reviewed    Assessment/Plan:     ESRD (end stage renal disease)  Dialysis not required today   3/7/2022 Continue with scheduled hemodialysis on MWF  3/8/2022 Dialysis session is going well.  3/10/2022 Continue with dialysis support.  3/11/2022 Dialysis as scheduled.  3/15/2022 Continue with dialysis.  3/16/2022 Dialysis session is going acceptable.        Thank you for your consult. I will follow-up with patient. Please contact us if you have any additional questions.    Edwin Pryor Jr, MD  Nephrology  [facility] [place]

## 2022-03-16 NOTE — PLAN OF CARE
Problem: Device-Related Complication Risk (Hemodialysis)  Goal: Safe, Effective Therapy Delivery  Outcome: Ongoing, Progressing     Problem: Hemodynamic Instability (Hemodialysis)  Goal: Effective Tissue Perfusion  Outcome: Ongoing, Progressing     Problem: Infection (Hemodialysis)  Goal: Absence of Infection Signs and Symptoms  Outcome: Ongoing, Progressing     Problem: Diabetes Comorbidity  Goal: Blood Glucose Level Within Targeted Range  Outcome: Ongoing, Progressing     Problem: Gas Exchange Impaired  Goal: Optimal Gas Exchange  Outcome: Ongoing, Progressing

## 2022-03-16 NOTE — SUBJECTIVE & OBJECTIVE
Interval History: No acute events overnight. Currently hemodynamically stable. Oxygenating adequately on the ventilator. Currently afebrile. Patient is now on continuous CPAP and doing well.    Objective:     Vital Signs (Most Recent):  Temp: 99 °F (37.2 °C) (03/16/22 1200)  Pulse: 86 (03/16/22 1200)  Resp: 14 (03/16/22 1238)  BP: (!) 111/56 (03/16/22 1200)  SpO2: 100 % (03/16/22 1200) Vital Signs (24h Range):  Temp:  [99 °F (37.2 °C)-100.3 °F (37.9 °C)] 99 °F (37.2 °C)  Pulse:  [83-92] 86  Resp:  [13-36] 14  SpO2:  [98 %-100 %] 100 %  BP: (101-141)/(49-69) 111/56     Weight: 79.9 kg (176 lb 2.4 oz)  Body mass index is 24.57 kg/m².      Intake/Output Summary (Last 24 hours) at 3/16/2022 1604  Last data filed at 3/16/2022 1200  Gross per 24 hour   Intake 1110 ml   Output 2575 ml   Net -1465 ml         Physical Exam  Vitals reviewed.   Constitutional:       General: He is not in acute distress.     Appearance: He is not ill-appearing.      Comments: Chronically ill appearing   HENT:      Head: Normocephalic and atraumatic.      Right Ear: External ear normal.      Left Ear: External ear normal.      Nose: Nose normal.      Mouth/Throat:      Mouth: Mucous membranes are moist.      Comments: trach  Eyes:      Conjunctiva/sclera: Conjunctivae normal.      Pupils: Pupils are equal, round, and reactive to light.   Cardiovascular:      Rate and Rhythm: Normal rate and regular rhythm.      Pulses: Normal pulses.      Heart sounds: Normal heart sounds.   Pulmonary:      Effort: No respiratory distress.      Comments: clear breath sounds anteriorly  Abdominal:      Palpations: Abdomen is soft.   Musculoskeletal:         General: Normal range of motion.      Cervical back: Neck supple.   Lymphadenopathy:      Cervical: No cervical adenopathy.   Skin:     General: Skin is warm and dry.      Coloration: Skin is not pale.   Neurological:      Mental Status: Mental status is at baseline.      Comments: Moves spontaneously, tracks  with good eye   Psychiatric:         Behavior: Behavior normal.       Vents:  Vent Mode: CPAP/PSV (03/16/22 1108)  Set Rate: 0 BPM (03/14/22 0314)  Vt Set: 0 mL (03/14/22 0314)  Pressure Support: 12 cmH20 (03/16/22 0828)  PEEP/CPAP: 5 cmH20 (03/16/22 1108)  Oxygen Concentration (%): 35 (03/16/22 0828)  Peak Airway Pressure: 18 cmH2O (03/16/22 1108)  Plateau Pressure: 20 cmH20 (03/07/22 0500)  Total Ve: 7.2 mL (03/16/22 1108)  F/VT Ratio<105 (RSBI): (!) 34.48 (03/16/22 0828)    Lines/Drains/Airways       Peripherally Inserted Central Catheter Line  Duration             PICC Double Lumen 01/05/22 left basilic 70 days              Central Venous Catheter Line  Duration                  Hemodialysis Catheter 12/30/21 0900 right internal jugular 76 days              Drain  Duration                  Colostomy 12/18/21 1030 Descending/sigmoid LUQ 88 days         Gastrostomy/Enterostomy 03/09/22 1436 Percutaneous endoscopic gastrostomy (PEG) midline feeding 7 days              Airway  Duration                  Surgical Airway 01/04/22 Shiley 71 days                    Significant Labs:    CBC/Anemia Profile:  No results for input(s): WBC, HGB, HCT, PLT, MCV, RDW, IRON, FERRITIN, RETIC, FOLATE, [id number], OCCULTBLOOD in the last 48 hours.       Chemistries:  No results for input(s): NA, K, CL, CO2, BUN, CREATININE, CALCIUM, ALBUMIN, PROT, BILITOT, ALKPHOS, ALT, AST, GLUCOSE, MG, PHOS in the last 48 hours.      All pertinent labs within the past 24 hours have been reviewed.    Significant Imaging:  I have reviewed all pertinent imaging results/findings within the past 24 hours.

## 2022-03-16 NOTE — PROGRESS NOTES
[facility]  Pulmonology  Progress Note    Patient Name: Og Garcia  MRN: [id number]  Admission Date: 12/23/2021  Hospital Length of Stay: 83 days  Code Status: Prior  Attending Provider: Cecil Abernathy DO  Primary Care Provider: Hector Arndt DNP, FNP-C   Principal Problem: Denice gangrene    Subjective:     Interval History: No acute events overnight. Currently hemodynamically stable. Oxygenating adequately on the ventilator. Currently afebrile. Patient is now on continuous CPAP and doing well.    Objective:     Vital Signs (Most Recent):  Temp: 99 °F (37.2 °C) (03/16/22 1200)  Pulse: 86 (03/16/22 1200)  Resp: 14 (03/16/22 1238)  BP: (!) 111/56 (03/16/22 1200)  SpO2: 100 % (03/16/22 1200) Vital Signs (24h Range):  Temp:  [99 °F (37.2 °C)-100.3 °F (37.9 °C)] 99 °F (37.2 °C)  Pulse:  [83-92] 86  Resp:  [13-36] 14  SpO2:  [98 %-100 %] 100 %  BP: (101-141)/(49-69) 111/56     Weight: 79.9 kg (176 lb 2.4 oz)  Body mass index is 24.57 kg/m².      Intake/Output Summary (Last 24 hours) at 3/16/2022 1604  Last data filed at 3/16/2022 1200  Gross per 24 hour   Intake 1110 ml   Output 2575 ml   Net -1465 ml         Physical Exam  Vitals reviewed.   Constitutional:       General: He is not in acute distress.     Appearance: He is not ill-appearing.      Comments: Chronically ill appearing   HENT:      Head: Normocephalic and atraumatic.      Right Ear: External ear normal.      Left Ear: External ear normal.      Nose: Nose normal.      Mouth/Throat:      Mouth: Mucous membranes are moist.      Comments: trach  Eyes:      Conjunctiva/sclera: Conjunctivae normal.      Pupils: Pupils are equal, round, and reactive to light.   Cardiovascular:      Rate and Rhythm: Normal rate and regular rhythm.      Pulses: Normal pulses.      Heart sounds: Normal heart sounds.   Pulmonary:      Effort: No respiratory distress.      Comments: clear breath sounds anteriorly  Abdominal:      Palpations:  Abdomen is soft.   Musculoskeletal:         General: Normal range of motion.      Cervical back: Neck supple.   Lymphadenopathy:      Cervical: No cervical adenopathy.   Skin:     General: Skin is warm and dry.      Coloration: Skin is not pale.   Neurological:      Mental Status: Mental status is at baseline.      Comments: Moves spontaneously, tracks with good eye   Psychiatric:         Behavior: Behavior normal.       Vents:  Vent Mode: CPAP/PSV (03/16/22 1108)  Set Rate: 0 BPM (03/14/22 0314)  Vt Set: 0 mL (03/14/22 0314)  Pressure Support: 12 cmH20 (03/16/22 0828)  PEEP/CPAP: 5 cmH20 (03/16/22 1108)  Oxygen Concentration (%): 35 (03/16/22 0828)  Peak Airway Pressure: 18 cmH2O (03/16/22 1108)  Plateau Pressure: 20 cmH20 (03/07/22 0500)  Total Ve: 7.2 mL (03/16/22 1108)  F/VT Ratio<105 (RSBI): (!) 34.48 (03/16/22 0828)    Lines/Drains/Airways       Peripherally Inserted Central Catheter Line  Duration             PICC Double Lumen 01/05/22 left basilic 70 days              Central Venous Catheter Line  Duration                  Hemodialysis Catheter 12/30/21 0900 right internal jugular 76 days              Drain  Duration                  Colostomy 12/18/21 1030 Descending/sigmoid LUQ 88 days         Gastrostomy/Enterostomy 03/09/22 1436 Percutaneous endoscopic gastrostomy (PEG) midline feeding 7 days              Airway  Duration                  Surgical Airway 01/04/22 Shiley 71 days                    Significant Labs:    CBC/Anemia Profile:  No results for input(s): WBC, HGB, HCT, PLT, MCV, RDW, IRON, FERRITIN, RETIC, FOLATE, [id number], OCCULTBLOOD in the last 48 hours.       Chemistries:  No results for input(s): NA, K, CL, CO2, BUN, CREATININE, CALCIUM, ALBUMIN, PROT, BILITOT, ALKPHOS, ALT, AST, GLUCOSE, MG, PHOS in the last 48 hours.      All pertinent labs within the past 24 hours have been reviewed.    Significant Imaging:  I have reviewed all pertinent imaging results/findings within the past 24  hours.    Assessment/Plan:     * Denice gangrene  - s/p multiple debridements  - wound vac in place  - ID consulted for antibiotic management: He was treated with rocephin, daptomycin, clindamycin. 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 Cazares  2/21- back to OR for skin grafting  3/9- had peg tube placed     Fever  2/26 -Tmax 101.4 -- resp culture - with GNB - will start Zosyn today - follow cultures   BC x 2 NGTD   2/28 - resp culture with pseudomonas and klebsiella  treat with zosyn for 7 days   Blood culture from 2/25 also growing Pseudomonas. Sensitive to zosyn. Will need to continue zosyn for at least 10 days total  3/9 He has completed antibiotics    ESRD (end stage renal disease)  Started on HD 12/30   Continue with HD per nephrology      Type 2 diabetes mellitus without complication, without long-term current use of insulin  - continue ISS  - glucose stable/controlled      Acute blood loss anemia  Has required multiple transfusions this admission    Most recent Hgb is 8.0 -    On mechanically assisted ventilation  - 12/14 intubated, tracheostomy 1/4  - continuous PSV/CPAP, had to be switched back to AC due to apnea spells  3/14- on cpap this am and doing well. Will try for continuous cpap again. Only place back on assist control if apnea causes drop in sat  3/15- doing well and no issues overnight. Hopefully can try some trach collar in the next couple of days  3/16- Had an episode earlier today that required patient to be placed back on ventilator. He was suctioned and had improvement    Hypertension  - continue diltiazem  - bp looks ok                   Cecil Abernathy, DO  Pulmonology  [facility]

## 2022-03-16 NOTE — ASSESSMENT & PLAN NOTE
Dialysis not required today   3/7/2022 Continue with scheduled hemodialysis on MWF  3/8/2022 Dialysis session is going well.  3/10/2022 Continue with dialysis support.  3/11/2022 Dialysis as scheduled.  3/15/2022 Continue with dialysis.  3/16/2022 Dialysis session is going acceptable.

## 2022-03-16 NOTE — NURSING
1945-Lying in bed with HOB^. Resp even et unlabored#8 LPC trach intact. On Cpap-12-5-35%. R SC HD cath intact. L Picc line iNT. Fentanyl patch noted to shoulder. Colostomy intact-berth bag-small amt of soft, brown, stool noted. Multiple dsg noted: R abd, Bilat feet, et sacral noted-all D/I. SCD et waffle boots noted to BLE. Repositioned for comfort. Oral care done. Open only eye when name called. Nod head on occasion. Withdraws when touched. Safety measures on-going.    2200-Suctioned. Still on Cpap. NAD noted @ present. Safety measures on-going.    0000-Quiet hrs. NAD noted @ present. Safety measures on-going. Physical assessment complete. No acute changes noted from earlier assessment.    0200-repositioned. Oral care done.    0400-Physical assessment completed. Bathe. Lien changed. Oral et skin care complete. Voided copious amt of yellow urine @ this time. Repositioned for comfort. Safety measures on-going.    0600-Eye open. Looking around Still on Cpap. No resp dstress noted this shift. Safety measures on-going

## 2022-03-16 NOTE — ASSESSMENT & PLAN NOTE
- 12/14 intubated, tracheostomy 1/4  - continuous PSV/CPAP, had to be switched back to AC due to apnea spells  3/14- on cpap this am and doing well. Will try for continuous cpap again. Only place back on assist control if apnea causes drop in sat  3/15- doing well and no issues overnight. Hopefully can try some trach collar in the next couple of days  3/16- Had an episode earlier today that required patient to be placed back on ventilator. He was suctioned and had improvement

## 2022-03-16 NOTE — PLAN OF CARE
Problem: Adult Inpatient Plan of Care  Goal: Plan of Care Review  Outcome: Ongoing, Progressing  Goal: Patient-Specific Goal (Individualized)  Outcome: Ongoing, Progressing  Goal: Absence of Hospital-Acquired Illness or Injury  Outcome: Ongoing, Progressing  Goal: Optimal Comfort and Wellbeing  Outcome: Ongoing, Progressing  Goal: Readiness for Transition of Care  Outcome: Ongoing, Progressing     Problem: Adjustment to Illness (Sepsis/Septic Shock)  Goal: Optimal Coping  Outcome: Ongoing, Progressing     Problem: Bleeding (Sepsis/Septic Shock)  Goal: Absence of Bleeding  Outcome: Ongoing, Progressing     Problem: Infection Progression (Sepsis/Septic Shock)  Goal: Absence of Infection Signs and Symptoms  Outcome: Ongoing, Progressing     Problem: Communication Impairment (Mechanical Ventilation, Invasive)  Goal: Effective Communication  Outcome: Ongoing, Progressing     Problem: Device-Related Complication Risk (Mechanical Ventilation, Invasive)  Goal: Optimal Device Function  Outcome: Ongoing, Progressing     Problem: Inability to Wean (Mechanical Ventilation, Invasive)  Goal: Mechanical Ventilation Liberation  Outcome: Ongoing, Progressing     Problem: Nutrition Impairment (Mechanical Ventilation, Invasive)  Goal: Optimal Nutrition Delivery  Outcome: Ongoing, Progressing     Problem: Skin and Tissue Injury (Mechanical Ventilation, Invasive)  Goal: Absence of Device-Related Skin and Tissue Injury  Outcome: Ongoing, Progressing     Problem: Ventilator-Induced Lung Injury (Mechanical Ventilation, Invasive)  Goal: Absence of Ventilator-Induced Lung Injury  Outcome: Ongoing, Progressing     Problem: Communication Impairment (Artificial Airway)  Goal: Effective Communication  Outcome: Ongoing, Progressing     Problem: Skin and Tissue Injury (Artificial Airway)  Goal: Absence of Device-Related Skin or Tissue Injury  Outcome: Ongoing, Progressing     Problem: Skin Injury Risk Increased  Goal: Skin Health and  Integrity  Outcome: Ongoing, Progressing     Problem: Device-Related Complication Risk (Hemodialysis)  Goal: Safe, Effective Therapy Delivery  Outcome: Ongoing, Progressing     Problem: Hemodynamic Instability (Hemodialysis)  Goal: Effective Tissue Perfusion  Outcome: Ongoing, Progressing

## 2022-03-16 NOTE — PROGRESS NOTES
At 1515 patient was placed back on AC/VC due to becoming tachypneic. Patient also became bradycardic. Pt was suctioned and his vitals returned to his normal. O2 Sats 100% at last vent check. Will report to oncoming shift.

## 2022-03-17 NOTE — ASSESSMENT & PLAN NOTE
2/26 -Tmax 101.4 -- resp culture - with GNB - will start Zosyn today - follow cultures   BC x 2 NGTD   2/28 - resp culture with pseudomonas and klebsiella  treat with zosyn for 7 days   Blood culture from 2/25 also growing Pseudomonas. Sensitive to zosyn. Will need to continue zosyn for at least 10 days total  3/9 He has completed antibiotics  03/17/2022 new infiltrate on the right will go ahead and repeat x-ray and culture and repeat labs

## 2022-03-17 NOTE — PROGRESS NOTES
[facility]  Nephrology  Progress Note    Patient Name: Og Garcia  MRN: [id number]  Admission Date: 12/23/2021  Hospital Length of Stay: 84 days  Attending Provider: Cecil Abernathy DO   Primary Care Physician: Hector Arndt DNP, FNP-C  Principal Problem:Denice gangrene    Subjective:     HPI: The patient is known from the previous nephrology consult at [facility].  He is no at Specialty and continues to need dialysis support.      Interval History: The patient is resting.  No acute changes.  He tolerated dialysis on yesterday.    Review of patient's allergies indicates:  No Known Allergies  Current Facility-Administered Medications   Medication Frequency    0.9%  NaCl infusion (for blood administration) Q24H PRN    0.9%  NaCl infusion PRN    acetaminophen tablet 500 mg Q6H PRN    acetic acid 0.25 % irrigation PRN    albuterol nebulizer solution 2.5 mg Q4H PRN    albuterol-ipratropium 2.5 mg-0.5 mg/3 mL nebulizer solution 3 mL Q6H    bisacodyL EC tablet 10 mg Daily PRN    calcium gluconate 1 g in dextrose 5 % in water (D5W) 5 % 100 mL IVPB (MB+) Once    dextromethorphan-guaiFENesin  mg/5 ml liquid 10 mL Q6H PRN    dextrose 50% injection 12.5 g PRN    diltiaZEM tablet 60 mg Q6H    fentaNYL 50 mcg/hr 1 patch Q72H    glucagon (human recombinant) injection 1 mg PRN    guaiFENesin 100 mg/5 ml syrup 200 mg Q6H    heparin (porcine) injection 4,000 Units PRN    insulin aspart U-100 injection 1-10 Units Q6H    lorazepam injection 2 mg Q6H PRN    methylPREDNISolone sodium succinate injection 40 mg Q6H    ondansetron injection 8 mg Q6H PRN    pantoprazole suspension 40 mg Daily    sevelamer carbonate pwpk 0.8 g TID WM    silver sulfADIAZINE 1% cream PRN    simethicone chewable tablet 80 mg TID PRN    ticagrelor tablet 90 mg BID       Objective:     Vital Signs (Most Recent):  Temp: 99.4 °F (37.4 °C) (03/17/22 0400)  Pulse: 85 (03/17/22 0734)  Resp: (!) 21 (03/17/22  0734)  BP: (!) 115/55 (03/17/22 0600)  SpO2: 100 % (03/17/22 0734)  O2 Device (Oxygen Therapy): ventilator (03/17/22 0734)   Vital Signs (24h Range):  Temp:  [99 °F (37.2 °C)-100.2 °F (37.9 °C)] 99.4 °F (37.4 °C)  Pulse:  [] 85  Resp:  [14-40] 21  SpO2:  [97 %-100 %] 100 %  BP: (103-146)/(46-64) 115/55     Weight: 77.3 kg (170 lb 6.7 oz) (03/17/22 0200)  Body mass index is 23.77 kg/m².  Body surface area is 1.97 meters squared.    I/O last 3 completed shifts:  In: 3330 [NG/GT:3330]  Out: 3025 [Other:2500; Stool:525]    Physical Exam  Vitals reviewed.   HENT:      Head: Atraumatic.   Cardiovascular:      Rate and Rhythm: Regular rhythm.      Pulses: Normal pulses.   Pulmonary:      Effort: Pulmonary effort is normal.      Comments: The patient is on trach collar  Abdominal:      Palpations: Abdomen is soft.      Comments: Peg tube   Neurological:      Mental Status: He is alert.       Significant Labs:  BMP: No results for input(s): GLU, NA, K, CL, CO2, BUN, CREATININE, CALCIUM, MG in the last 168 hours.  CBC: No results for input(s): WBC, RBC, HGB, HCT, PLT, MCV, MCH, MCHC in the last 168 hours.     Significant Imaging:  Labs: Reviewed    Assessment/Plan:     ESRD (end stage renal disease)  Dialysis not required today   3/7/2022 Continue with scheduled hemodialysis on MWF  3/8/2022 Dialysis session is going well.  3/10/2022 Continue with dialysis support.  3/11/2022 Dialysis as scheduled.  3/15/2022 Continue with dialysis.  3/16/2022 Dialysis session is going acceptable.  3/17/2022 Continue with dialysis as scheduled.        Thank you for your consult. I will follow-up with patient. Please contact us if you have any additional questions.    Edwin Pryor Jr, MD  Nephrology  [facility]

## 2022-03-17 NOTE — PLAN OF CARE
Problem: Adult Inpatient Plan of Care  Goal: Plan of Care Review  Outcome: Ongoing, Progressing  Goal: Patient-Specific Goal (Individualized)  Outcome: Ongoing, Progressing  Goal: Absence of Hospital-Acquired Illness or Injury  Outcome: Ongoing, Progressing  Goal: Optimal Comfort and Wellbeing  Outcome: Ongoing, Progressing  Goal: Readiness for Transition of Care  Outcome: Ongoing, Progressing     Problem: Adjustment to Illness (Sepsis/Septic Shock)  Goal: Optimal Coping  Outcome: Ongoing, Progressing     Problem: Bleeding (Sepsis/Septic Shock)  Goal: Absence of Bleeding  Outcome: Ongoing, Progressing     Problem: Glycemic Control Impaired (Sepsis/Septic Shock)  Goal: Blood Glucose Level Within Desired Range  Outcome: Ongoing, Progressing     Problem: Infection Progression (Sepsis/Septic Shock)  Goal: Absence of Infection Signs and Symptoms  Outcome: Ongoing, Progressing     Problem: Nutrition Impaired (Sepsis/Septic Shock)  Goal: Optimal Nutrition Intake  Outcome: Ongoing, Progressing     Problem: Fluid and Electrolyte Imbalance (Acute Kidney Injury/Impairment)  Goal: Fluid and Electrolyte Balance  Outcome: Ongoing, Progressing     Problem: Oral Intake Inadequate (Acute Kidney Injury/Impairment)  Goal: Optimal Nutrition Intake  Outcome: Ongoing, Progressing     Problem: Renal Function Impairment (Acute Kidney Injury/Impairment)  Goal: Effective Renal Function  Outcome: Ongoing, Progressing     Problem: Infection  Goal: Absence of Infection Signs and Symptoms  Outcome: Ongoing, Progressing     Problem: Fall Injury Risk  Goal: Absence of Fall and Fall-Related Injury  Outcome: Ongoing, Progressing     Problem: Communication Impairment (Mechanical Ventilation, Invasive)  Goal: Effective Communication  Outcome: Ongoing, Progressing     Problem: Device-Related Complication Risk (Mechanical Ventilation, Invasive)  Goal: Optimal Device Function  Outcome: Ongoing, Progressing     Problem: Inability to Wean (Mechanical  Ventilation, Invasive)  Goal: Mechanical Ventilation Liberation  Outcome: Ongoing, Progressing     Problem: Nutrition Impairment (Mechanical Ventilation, Invasive)  Goal: Optimal Nutrition Delivery  Outcome: Ongoing, Progressing     Problem: Skin and Tissue Injury (Mechanical Ventilation, Invasive)  Goal: Absence of Device-Related Skin and Tissue Injury  Outcome: Ongoing, Progressing     Problem: Ventilator-Induced Lung Injury (Mechanical Ventilation, Invasive)  Goal: Absence of Ventilator-Induced Lung Injury  Outcome: Ongoing, Progressing     Problem: Communication Impairment (Artificial Airway)  Goal: Effective Communication  Outcome: Ongoing, Progressing     Problem: Skin and Tissue Injury (Artificial Airway)  Goal: Absence of Device-Related Skin or Tissue Injury  Outcome: Ongoing, Progressing     Problem: Noninvasive Ventilation Acute  Goal: Effective Unassisted Ventilation and Oxygenation  Outcome: Ongoing, Progressing     Problem: Skin Injury Risk Increased  Goal: Skin Health and Integrity  Outcome: Ongoing, Progressing     Problem: Device-Related Complication Risk (Hemodialysis)  Goal: Safe, Effective Therapy Delivery  Outcome: Ongoing, Progressing     Problem: Hemodynamic Instability (Hemodialysis)  Goal: Effective Tissue Perfusion  Outcome: Ongoing, Progressing     Problem: Infection (Hemodialysis)  Goal: Absence of Infection Signs and Symptoms  Outcome: Ongoing, Progressing     Problem: Diabetes Comorbidity  Goal: Blood Glucose Level Within Targeted Range  Outcome: Ongoing, Progressing     Problem: Impaired Wound Healing  Goal: Optimal Wound Healing  Outcome: Ongoing, Progressing     Problem: Gas Exchange Impaired  Goal: Optimal Gas Exchange  Outcome: Ongoing, Progressing

## 2022-03-17 NOTE — ASSESSMENT & PLAN NOTE
Dialysis not required today   3/7/2022 Continue with scheduled hemodialysis on MWF  3/8/2022 Dialysis session is going well.  3/10/2022 Continue with dialysis support.  3/11/2022 Dialysis as scheduled.  3/15/2022 Continue with dialysis.  3/16/2022 Dialysis session is going acceptable.  3/17/2022 Continue with dialysis as scheduled.

## 2022-03-17 NOTE — RESPIRATORY THERAPY
1430 Patient completed 7 hours of SBT and was taken off due to several episodes of apnea. Patient experienced bradycardia a few times during the shift. Will report to oncoming therapist.

## 2022-03-17 NOTE — ASSESSMENT & PLAN NOTE
- 12/14 intubated, tracheostomy 1/4  - continuous PSV/CPAP, had to be switched back to AC due to apnea spells  3/14- on cpap this am and doing well. Will try for continuous cpap again. Only place back on assist control if apnea causes drop in sat  3/15- doing well and no issues overnight. Hopefully can try some trach collar in the next couple of days  3/16- Had an episode earlier today that required patient to be placed back on ventilator. He was suctioned and had improvement  03/17/2022 not tolerating weaning trials very well

## 2022-03-17 NOTE — PLAN OF CARE
Problem: Device-Related Complication Risk (Mechanical Ventilation, Invasive)  Goal: Optimal Device Function  Outcome: Ongoing, Progressing     Problem: Skin and Tissue Injury (Mechanical Ventilation, Invasive)  Goal: Absence of Device-Related Skin and Tissue Injury  Outcome: Ongoing, Progressing     Problem: Ventilator-Induced Lung Injury (Mechanical Ventilation, Invasive)  Goal: Absence of Ventilator-Induced Lung Injury  Outcome: Ongoing, Progressing     Problem: Noninvasive Ventilation Acute  Goal: Effective Unassisted Ventilation and Oxygenation  Outcome: Ongoing, Progressing     Problem: Gas Exchange Impaired  Goal: Optimal Gas Exchange  Outcome: Ongoing, Progressing

## 2022-03-17 NOTE — SUBJECTIVE & OBJECTIVE
Interval History:  Resting    Objective:     Vital Signs (Most Recent):  Temp: 99.4 °F (37.4 °C) (03/17/22 0400)  Pulse: 83 (03/17/22 0400)  Resp: (!) 24 (03/17/22 0400)  BP: (!) 112/59 (03/17/22 0400)  SpO2: 100 % (03/17/22 0400)   Vital Signs (24h Range):  Temp:  [99 °F (37.2 °C)-100.2 °F (37.9 °C)] 99.4 °F (37.4 °C)  Pulse:  [] 83  Resp:  [14-40] 24  SpO2:  [97 %-100 %] 100 %  BP: (103-146)/(46-69) 112/59     Weight: 77.3 kg (170 lb 6.7 oz)  Body mass index is 23.77 kg/m².      Intake/Output Summary (Last 24 hours) at 3/17/2022 0552  Last data filed at 3/17/2022 0000  Gross per 24 hour   Intake 2520 ml   Output 3025 ml   Net -505 ml       Physical Exam  Vitals reviewed.   Constitutional:       Appearance: Normal appearance.      Interventions: He is not intubated.  HENT:      Head: Normocephalic and atraumatic.      Nose: Nose normal.      Mouth/Throat:      Mouth: Mucous membranes are dry.      Pharynx: Oropharynx is clear.   Eyes:      Extraocular Movements: Extraocular movements intact.      Conjunctiva/sclera: Conjunctivae normal.      Pupils: Pupils are equal, round, and reactive to light.   Cardiovascular:      Rate and Rhythm: Normal rate.      Heart sounds: Normal heart sounds. No murmur heard.  Pulmonary:      Effort: Pulmonary effort is normal. He is not intubated.      Breath sounds: Normal breath sounds.   Abdominal:      General: Abdomen is flat. Bowel sounds are normal.      Palpations: Abdomen is soft.   Musculoskeletal:         General: Normal range of motion.      Cervical back: Normal range of motion and neck supple.      Right lower leg: No edema.      Left lower leg: No edema.   Skin:     General: Skin is warm and dry.      Capillary Refill: Capillary refill takes less than 2 seconds.   Neurological:      General: No focal deficit present.      Mental Status: He is alert and oriented to person, place, and time.   Psychiatric:         Mood and Affect: Mood normal.         Behavior:  Behavior normal.       Vents:  Vent Mode: A/C (03/17/22 0100)  Set Rate: 16 BPM (03/17/22 0100)  Vt Set: 480 mL (03/17/22 0100)  Pressure Support: 12 cmH20 (03/16/22 2246)  PEEP/CPAP: 5 cmH20 (03/17/22 0100)  Oxygen Concentration (%): 35 (03/17/22 0000)  Peak Airway Pressure: 17 cmH2O (03/17/22 0100)  Plateau Pressure: 20 cmH20 (03/07/22 0500)  Total Ve: 11.6 mL (03/17/22 0100)  F/VT Ratio<105 (RSBI): (!) 58.51 (03/17/22 0100)    Lines/Drains/Airways       Peripherally Inserted Central Catheter Line  Duration             PICC Double Lumen 01/05/22 left basilic 71 days              Central Venous Catheter Line  Duration                  Hemodialysis Catheter 12/30/21 0900 right internal jugular 76 days              Drain  Duration                  Colostomy 12/18/21 1030 Descending/sigmoid LUQ 88 days         Gastrostomy/Enterostomy 03/09/22 1436 Percutaneous endoscopic gastrostomy (PEG) midline feeding 7 days              Airway  Duration                  Surgical Airway 01/04/22 Shiley 72 days                    Significant Labs:    CBC/Anemia Profile:  No results for input(s): WBC, HGB, HCT, PLT, MCV, RDW, IRON, FERRITIN, RETIC, FOLATE, [id number], OCCULTBLOOD in the last 48 hours.     Chemistries:  No results for input(s): NA, K, CL, CO2, BUN, CREATININE, CALCIUM, ALBUMIN, PROT, BILITOT, ALKPHOS, ALT, AST, GLUCOSE, MG, PHOS in the last 48 hours.    All pertinent labs within the past 24 hours have been reviewed.    Significant Imaging:  I have reviewed all pertinent imaging results/findings within the past 24 hours.

## 2022-03-17 NOTE — SUBJECTIVE & OBJECTIVE
Interval History: The patient is resting.  No acute changes.  He tolerated dialysis on yesterday.    Review of patient's allergies indicates:  No Known Allergies  Current Facility-Administered Medications   Medication Frequency    0.9%  NaCl infusion (for blood administration) Q24H PRN    0.9%  NaCl infusion PRN    acetaminophen tablet 500 mg Q6H PRN    acetic acid 0.25 % irrigation PRN    albuterol nebulizer solution 2.5 mg Q4H PRN    albuterol-ipratropium 2.5 mg-0.5 mg/3 mL nebulizer solution 3 mL Q6H    bisacodyL EC tablet 10 mg Daily PRN    calcium gluconate 1 g in dextrose 5 % in water (D5W) 5 % 100 mL IVPB (MB+) Once    dextromethorphan-guaiFENesin  mg/5 ml liquid 10 mL Q6H PRN    dextrose 50% injection 12.5 g PRN    diltiaZEM tablet 60 mg Q6H    fentaNYL 50 mcg/hr 1 patch Q72H    glucagon (human recombinant) injection 1 mg PRN    guaiFENesin 100 mg/5 ml syrup 200 mg Q6H    heparin (porcine) injection 4,000 Units PRN    insulin aspart U-100 injection 1-10 Units Q6H    lorazepam injection 2 mg Q6H PRN    methylPREDNISolone sodium succinate injection 40 mg Q6H    ondansetron injection 8 mg Q6H PRN    pantoprazole suspension 40 mg Daily    sevelamer carbonate pwpk 0.8 g TID WM    silver sulfADIAZINE 1% cream PRN    simethicone chewable tablet 80 mg TID PRN    ticagrelor tablet 90 mg BID       Objective:     Vital Signs (Most Recent):  Temp: 99.4 °F (37.4 °C) (03/17/22 0400)  Pulse: 85 (03/17/22 0734)  Resp: (!) 21 (03/17/22 0734)  BP: (!) 115/55 (03/17/22 0600)  SpO2: 100 % (03/17/22 0734)  O2 Device (Oxygen Therapy): ventilator (03/17/22 0734)   Vital Signs (24h Range):  Temp:  [99 °F (37.2 °C)-100.2 °F (37.9 °C)] 99.4 °F (37.4 °C)  Pulse:  [] 85  Resp:  [14-40] 21  SpO2:  [97 %-100 %] 100 %  BP: (103-146)/(46-64) 115/55     Weight: 77.3 kg (170 lb 6.7 oz) (03/17/22 0200)  Body mass index is 23.77 kg/m².  Body surface area is 1.97 meters squared.    I/O last 3 completed shifts:  In: 5069  [NG/GT:3330]  Out: 3025 [Other:2500; Stool:525]    Physical Exam  Vitals reviewed.   HENT:      Head: Atraumatic.   Cardiovascular:      Rate and Rhythm: Regular rhythm.      Pulses: Normal pulses.   Pulmonary:      Effort: Pulmonary effort is normal.      Comments: The patient is on trach collar  Abdominal:      Palpations: Abdomen is soft.      Comments: Peg tube   Neurological:      Mental Status: He is alert.       Significant Labs:  BMP: No results for input(s): GLU, NA, K, CL, CO2, BUN, CREATININE, CALCIUM, MG in the last 168 hours.  CBC: No results for input(s): WBC, RBC, HGB, HCT, PLT, MCV, MCH, MCHC in the last 168 hours.     Significant Imaging:  Labs: Reviewed

## 2022-03-17 NOTE — RESPIRATORY THERAPY
0755- Placed patient on CPAP PSV 12 35%+5. Will continue with CPAP trial as tolerated by the patient.

## 2022-03-17 NOTE — PROGRESS NOTES
[facility]  Pulmonology  Progress Note    Patient Name: Og Garcia  MRN: [id number]  Admission Date: 12/23/2021  Hospital Length of Stay: 84 days  Code Status: Prior  Attending Provider: Cecil Abernathy DO  Primary Care Provider: Hector Arndt DNP, FNP-C   Principal Problem: Denice gangrene    Subjective:     Interval History:  Resting    Objective:     Vital Signs (Most Recent):  Temp: 99.4 °F (37.4 °C) (03/17/22 0400)  Pulse: 83 (03/17/22 0400)  Resp: (!) 24 (03/17/22 0400)  BP: (!) 112/59 (03/17/22 0400)  SpO2: 100 % (03/17/22 0400)   Vital Signs (24h Range):  Temp:  [99 °F (37.2 °C)-100.2 °F (37.9 °C)] 99.4 °F (37.4 °C)  Pulse:  [] 83  Resp:  [14-40] 24  SpO2:  [97 %-100 %] 100 %  BP: (103-146)/(46-69) 112/59     Weight: 77.3 kg (170 lb 6.7 oz)  Body mass index is 23.77 kg/m².      Intake/Output Summary (Last 24 hours) at 3/17/2022 0552  Last data filed at 3/17/2022 0000  Gross per 24 hour   Intake 2520 ml   Output 3025 ml   Net -505 ml       Physical Exam  Vitals reviewed.   Constitutional:       Appearance: Normal appearance.      Interventions: He is not intubated.  HENT:      Head: Normocephalic and atraumatic.      Nose: Nose normal.      Mouth/Throat:      Mouth: Mucous membranes are dry.      Pharynx: Oropharynx is clear.   Eyes:      Extraocular Movements: Extraocular movements intact.      Conjunctiva/sclera: Conjunctivae normal.      Pupils: Pupils are equal, round, and reactive to light.   Cardiovascular:      Rate and Rhythm: Normal rate.      Heart sounds: Normal heart sounds. No murmur heard.  Pulmonary:      Effort: Pulmonary effort is normal. He is not intubated.      Breath sounds: Normal breath sounds.   Abdominal:      General: Abdomen is flat. Bowel sounds are normal.      Palpations: Abdomen is soft.   Musculoskeletal:         General: Normal range of motion.      Cervical back: Normal range of motion and neck supple.      Right lower leg: No  edema.      Left lower leg: No edema.   Skin:     General: Skin is warm and dry.      Capillary Refill: Capillary refill takes less than 2 seconds.   Neurological:      General: No focal deficit present.      Mental Status: He is alert and oriented to person, place, and time.   Psychiatric:         Mood and Affect: Mood normal.         Behavior: Behavior normal.       Vents:  Vent Mode: A/C (03/17/22 0100)  Set Rate: 16 BPM (03/17/22 0100)  Vt Set: 480 mL (03/17/22 0100)  Pressure Support: 12 cmH20 (03/16/22 2246)  PEEP/CPAP: 5 cmH20 (03/17/22 0100)  Oxygen Concentration (%): 35 (03/17/22 0000)  Peak Airway Pressure: 17 cmH2O (03/17/22 0100)  Plateau Pressure: 20 cmH20 (03/07/22 0500)  Total Ve: 11.6 mL (03/17/22 0100)  F/VT Ratio<105 (RSBI): (!) 58.51 (03/17/22 0100)    Lines/Drains/Airways       Peripherally Inserted Central Catheter Line  Duration             PICC Double Lumen 01/05/22 left basilic 71 days              Central Venous Catheter Line  Duration                  Hemodialysis Catheter 12/30/21 0900 right internal jugular 76 days              Drain  Duration                  Colostomy 12/18/21 1030 Descending/sigmoid LUQ 88 days         Gastrostomy/Enterostomy 03/09/22 1436 Percutaneous endoscopic gastrostomy (PEG) midline feeding 7 days              Airway  Duration                  Surgical Airway 01/04/22 Shiley 72 days                    Significant Labs:    CBC/Anemia Profile:  No results for input(s): WBC, HGB, HCT, PLT, MCV, RDW, IRON, FERRITIN, RETIC, FOLATE, [id number], OCCULTBLOOD in the last 48 hours.     Chemistries:  No results for input(s): NA, K, CL, CO2, BUN, CREATININE, CALCIUM, ALBUMIN, PROT, BILITOT, ALKPHOS, ALT, AST, GLUCOSE, MG, PHOS in the last 48 hours.    All pertinent labs within the past 24 hours have been reviewed.    Significant Imaging:  I have reviewed all pertinent imaging results/findings within the past 24 hours.    Assessment/Plan:     * Denice gangrene  - s/p  multiple debridements  - wound vac in place  - ID consulted for antibiotic management: He was treated with rocephin, daptomycin, clindamycin. 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 Cazares  2/21- back to OR for skin grafting  3/9- had peg tube placed     On mechanically assisted ventilation  - 12/14 intubated, tracheostomy 1/4  - continuous PSV/CPAP, had to be switched back to AC due to apnea spells  3/14- on cpap this am and doing well. Will try for continuous cpap again. Only place back on assist control if apnea causes drop in sat  3/15- doing well and no issues overnight. Hopefully can try some trach collar in the next couple of days  3/16- Had an episode earlier today that required patient to be placed back on ventilator. He was suctioned and had improvement  03/17/2022 not tolerating weaning trials very well    Hypertension  - continue diltiazem  - bp looks ok      Fever  2/26 -Tmax 101.4 -- resp culture - with GNB - will start Zosyn today - follow cultures   BC x 2 NGTD   2/28 - resp culture with pseudomonas and klebsiella  treat with zosyn for 7 days   Blood culture from 2/25 also growing Pseudomonas. Sensitive to zosyn. Will need to continue zosyn for at least 10 days total  3/9 He has completed antibiotics  03/17/2022 new infiltrate on the right will go ahead and repeat x-ray and culture and repeat labs    ESRD (end stage renal disease)  Started on HD 12/30   Continue with HD per nephrology      Type 2 diabetes mellitus without complication, without long-term current use of insulin  - continue ISS  - glucose stable/controlled      Acute blood loss anemia  Has required multiple transfusions this admission  Repeat  x-ray and lab    Necrotizing fasciitis  S/p multiple surgeries                  Jose Urban MD  Pulmonology  [facility]

## 2022-03-17 NOTE — PLAN OF CARE
Problem: Adult Inpatient Plan of Care  Goal: Plan of Care Review  Outcome: Ongoing, Progressing     Problem: Communication Impairment (Mechanical Ventilation, Invasive)  Goal: Effective Communication  Outcome: Ongoing, Progressing     Problem: Device-Related Complication Risk (Mechanical Ventilation, Invasive)  Goal: Optimal Device Function  Outcome: Ongoing, Progressing     Problem: Inability to Wean (Mechanical Ventilation, Invasive)  Goal: Mechanical Ventilation Liberation  Outcome: Ongoing, Progressing     Problem: Nutrition Impairment (Mechanical Ventilation, Invasive)  Goal: Optimal Nutrition Delivery  Outcome: Ongoing, Progressing     Problem: Skin and Tissue Injury (Mechanical Ventilation, Invasive)  Goal: Absence of Device-Related Skin and Tissue Injury  Outcome: Ongoing, Progressing     Problem: Ventilator-Induced Lung Injury (Mechanical Ventilation, Invasive)  Goal: Absence of Ventilator-Induced Lung Injury  Outcome: Ongoing, Progressing     Problem: Communication Impairment (Artificial Airway)  Goal: Effective Communication  Outcome: Ongoing, Progressing     Problem: Device-Related Complication Risk (Artificial Airway)  Goal: Optimal Device Function  Outcome: Ongoing, Progressing     Problem: Skin and Tissue Injury (Artificial Airway)  Goal: Absence of Device-Related Skin or Tissue Injury  Outcome: Ongoing, Progressing     Problem: Device-Related Complication Risk (Hemodialysis)  Goal: Safe, Effective Therapy Delivery  Outcome: Ongoing, Progressing     Problem: Hemodynamic Instability (Hemodialysis)  Goal: Effective Tissue Perfusion  Outcome: Ongoing, Progressing     Problem: Infection (Hemodialysis)  Goal: Absence of Infection Signs and Symptoms  Outcome: Ongoing, Progressing     Problem: Gas Exchange Impaired  Goal: Optimal Gas Exchange  Outcome: Ongoing, Progressing

## 2022-03-18 NOTE — PLAN OF CARE
Problem: Adult Inpatient Plan of Care  Goal: Plan of Care Review  Outcome: Ongoing, Progressing     Problem: Communication Impairment (Mechanical Ventilation, Invasive)  Goal: Effective Communication  Outcome: Ongoing, Progressing     Problem: Device-Related Complication Risk (Mechanical Ventilation, Invasive)  Goal: Optimal Device Function  Outcome: Ongoing, Progressing     Problem: Inability to Wean (Mechanical Ventilation, Invasive)  Goal: Mechanical Ventilation Liberation  Outcome: Ongoing, Progressing     Problem: Nutrition Impairment (Mechanical Ventilation, Invasive)  Goal: Optimal Nutrition Delivery  Outcome: Ongoing, Progressing     Problem: Skin and Tissue Injury (Mechanical Ventilation, Invasive)  Goal: Absence of Device-Related Skin and Tissue Injury  Outcome: Ongoing, Progressing     Problem: Ventilator-Induced Lung Injury (Mechanical Ventilation, Invasive)  Goal: Absence of Ventilator-Induced Lung Injury  Outcome: Ongoing, Progressing     Problem: Communication Impairment (Artificial Airway)  Goal: Effective Communication  Outcome: Ongoing, Progressing     Problem: Device-Related Complication Risk (Artificial Airway)  Goal: Optimal Device Function  Outcome: Ongoing, Progressing     Problem: Skin and Tissue Injury (Artificial Airway)  Goal: Absence of Device-Related Skin or Tissue Injury  Outcome: Ongoing, Progressing     Problem: Noninvasive Ventilation Acute  Goal: Effective Unassisted Ventilation and Oxygenation  Outcome: Ongoing, Progressing     Problem: Skin Injury Risk Increased  Goal: Skin Health and Integrity  Outcome: Ongoing, Progressing     Problem: Device-Related Complication Risk (Hemodialysis)  Goal: Safe, Effective Therapy Delivery  Outcome: Ongoing, Progressing     Problem: Hemodynamic Instability (Hemodialysis)  Goal: Effective Tissue Perfusion  Outcome: Ongoing, Progressing     Problem: Gas Exchange Impaired  Goal: Optimal Gas Exchange  Outcome: Ongoing, Progressing

## 2022-03-18 NOTE — NURSING
Received patient in bed, no acute distress noted, no resp distress noted, prbc's infusing no adverse reaction noted at this times.

## 2022-03-18 NOTE — PROGRESS NOTES
[facility]  Pulmonology  Progress Note    Patient Name: Og Garcia  MRN: [id number]  Admission Date: 12/23/2021  Hospital Length of Stay: 85 days  Code Status: Prior  Attending Provider: Cecil Abernathy DO  Primary Care Provider: Hector Arndt DNP, FNP-C   Principal Problem: Denice gangrene    Subjective:     Interval History:  No history    Objective:     Vital Signs (Most Recent):  Temp: 97.9 °F (36.6 °C) (03/18/22 0400)  Pulse: 82 (03/18/22 0500)  Resp: (!) 29 (03/18/22 0500)  BP: (!) 140/67 (03/18/22 0500)  SpO2: 100 % (03/18/22 0500)   Vital Signs (24h Range):  Temp:  [97.6 °F (36.4 °C)-99.7 °F (37.6 °C)] 97.9 °F (36.6 °C)  Pulse:  [] 82  Resp:  [10-37] 29  SpO2:  [94 %-100 %] 100 %  BP: ()/(51-80) 140/67     Weight: 79 kg (174 lb 2.6 oz)  Body mass index is 24.29 kg/m².      Intake/Output Summary (Last 24 hours) at 3/18/2022 0554  Last data filed at 3/18/2022 0400  Gross per 24 hour   Intake 2750.17 ml   Output 450 ml   Net 2300.17 ml       Physical Exam  Vitals reviewed.   Constitutional:       Appearance: Normal appearance.      Interventions: He is not intubated.     Comments: Tracheostomy, enucleation of right eye I   HENT:      Head: Normocephalic and atraumatic.      Nose: Nose normal.      Mouth/Throat:      Mouth: Mucous membranes are dry.      Pharynx: Oropharynx is clear.   Eyes:      Extraocular Movements: Extraocular movements intact.      Conjunctiva/sclera: Conjunctivae normal.      Pupils: Pupils are equal, round, and reactive to light.   Cardiovascular:      Rate and Rhythm: Normal rate.      Heart sounds: Normal heart sounds. No murmur heard.  Pulmonary:      Effort: Pulmonary effort is normal. He is not intubated.      Breath sounds: Normal breath sounds.   Abdominal:      General: Abdomen is flat. Bowel sounds are normal.      Palpations: Abdomen is soft.   Musculoskeletal:         General: Normal range of motion.      Cervical back: Normal  range of motion and neck supple.      Right lower leg: No edema.      Left lower leg: No edema.   Skin:     General: Skin is warm and dry.      Capillary Refill: Capillary refill takes less than 2 seconds.   Neurological:      General: No focal deficit present.      Mental Status: He is alert and oriented to person, place, and time.   Psychiatric:         Mood and Affect: Mood normal.         Behavior: Behavior normal.       Vents:  Vent Mode: PS/CPAP (03/18/22 0009)  Set Rate: 16 BPM (03/17/22 2027)  Vt Set: 480 mL (03/17/22 2027)  Pressure Support: 12 cmH20 (03/18/22 0009)  PEEP/CPAP: 5 cmH20 (03/18/22 0009)  Oxygen Concentration (%): 35 (03/18/22 0323)  Peak Airway Pressure: 18 cmH2O (03/18/22 0009)  Plateau Pressure: 20 cmH20 (03/07/22 0500)  Total Ve: 10.9 mL (03/18/22 0009)  F/VT Ratio<105 (RSBI): (!) 34.04 (03/18/22 0009)    Lines/Drains/Airways       Peripherally Inserted Central Catheter Line  Duration             PICC Double Lumen 01/05/22 left basilic 72 days              Central Venous Catheter Line  Duration                  Hemodialysis Catheter 12/30/21 0900 right internal jugular 77 days              Drain  Duration                  Colostomy 12/18/21 1030 Descending/sigmoid LUQ 89 days         Gastrostomy/Enterostomy 03/09/22 1436 Percutaneous endoscopic gastrostomy (PEG) midline feeding 8 days              Airway  Duration                  Surgical Airway 01/04/22 Shiley 73 days                    Significant Labs:    CBC/Anemia Profile:  Recent Labs   Lab 03/17/22  1052   WBC 17.75*   HGB 5.3*   HCT 17.0*      MCV 92.9   RDW 14.6*        Chemistries:  Recent Labs   Lab 03/17/22  1052   *   K 4.7   CL 93*   CO2 24   BUN 60*   CREATININE 3.56*   CALCIUM 8.5       All pertinent labs within the past 24 hours have been reviewed.    Significant Imaging:  I have reviewed all pertinent imaging results/findings within the past 24 hours.    Assessment/Plan:     * Denice gangrene  - s/p  multiple debridements  - wound vac in place  - ID consulted for antibiotic management: He was treated with rocephin, daptomycin, clindamycin. 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 Cazares  2/21- back to OR for skin grafting  3/9- had peg tube placed   03/18/2022 no changes    On mechanically assisted ventilation  - 12/14 intubated, tracheostomy 1/4  - continuous PSV/CPAP, had to be switched back to AC due to apnea spells  3/14- on cpap this am and doing well. Will try for continuous cpap again. Only place back on assist control if apnea causes drop in sat  3/15- doing well and no issues overnight. Hopefully can try some trach collar in the next couple of days  3/16- Had an episode earlier today that required patient to be placed back on ventilator. He was suctioned and had improvement  Continue to push weaning trials    Hypertension  - continue diltiazem  - bp looks ok      Fever  2/26 -Tmax 101.4 -- resp culture - with GNB - will start Zosyn today - follow cultures   BC x 2 NGTD   2/28 - resp culture with pseudomonas and klebsiella  treat with zosyn for 7 days   Blood culture from 2/25 also growing Pseudomonas. Sensitive to zosyn. Will need to continue zosyn for at least 10 days total  3/9 He has completed antibiotics  03/17/2022 new infiltrate on the right will go ahead and repeat x-ray and culture and repeat labs white blood cell count is up no fever will continue to watch    ESRD (end stage renal disease)  Started on HD 12/30   Continue with HD per nephrology      Type 2 diabetes mellitus without complication, without long-term current use of insulin  Up a little bit currently      Acute blood loss anemia  Transfuse  unit of blood waiting for blood results today may need another unit of blood    Necrotizing fasciitis  S/p multiple surgeries                  Jose Urban MD  Pulmonology  [facility]

## 2022-03-18 NOTE — PROGRESS NOTES
[facility]  Adult Nutrition  Follow-up Note         Reason for Assessment  Reason For Assessment: RD follow-up  Nutrition Risk Screen: tube feeding or parenteral nutrition  Malnutrition  Is Patient Malnourished: No  Nutrition Diagnosis  Inadequate oral intake   related to Decreased ability to consume sufficient energy as evidenced by pt on mechanical ventilation    Nutrition Diagnosis Status: Chronic/ continues      Nutrition Risk  Level of Risk/Frequency of Follow-up: high   Chewing or Swallowing Difficulty?: Swallowing difficulty  Estimated/Assessed Needs  RMR (Addy-St. Jeor Equation): 1592.13 Activity Factor: 1 Injury Factor: 1.2   Total Ve: 8.7 mL Temp: 97.9 °F (36.6 °C)Axillary  Weight Used For Calorie Calculations: 79.2 kg (174 lb 9.7 oz)   Energy Need Method: [place] (modified) Energy Calorie Requirements (kcal): 2105  Weight Used For Protein Calculations: 83.3 kg (183 lb 10.3 oz)  Protein Requirements: 100-125  Estimated Fluid Requirement Method: RDA Method Fluid Requirements (mL): 1966  RDA Method (mL): 2105     Nutrition Prescription / Recommendations  Recommendation/Intervention: continue PEG Tube feeding: Nepro @ 55ml/hr; H20 flush of 150ml q 4hr  Goals: pt will tolerate tube feeding: wound healing, will meet estimated nutritional needs  Nutrition Goal Status: progressing towards goal  Communication of RD Recs: reviewed with physician  Current Diet Order: NPO/ PEG tube feeding  Nutrition Order Comments: Tube feeding: Nepro @ 55ml/hr; H20 flush of 150ml q 4hr  Current Nutrition Support Formula Ordered: Nepro  Current Nutrition Support Rate Ordered: 55 (ml)  Current Nutrition Support Frequency Ordered: hourly  Recommended Diet: Enteral Nutrition   Nepro @ 55ml/hr; H20 flush of 150ml q 4hr    Monitor and Evaluation  % current Intake: Enteral Nutrition at goal  % intake to meet estimated needs: Enteral Nutrition   Food and Nutrient Intake: enteral nutrition intake  Food and  "Nutrient Adminstration: enteral and parenteral nutrition administration  Anthropometric Measurements: height/length, body mass index, weight, weight change  Biochemical Data, Medical Tests and Procedures: electrolyte and renal panel, glucose/endocrine profile  Nutrition-Focused Physical Findings: skin  Enteral Calories (kcal): 2376  Enteral Protein (gm): 106  Enteral (Free Water) Fluid (mL): 950  Free Water Flush Fluid (mL): 900  Parenteral Calories (kcal): 845  Parenteral Protein (gm): 48  Parenteral Fluid (mL): 960  Lipid Calories (kcals): 500 kcals  Other Calories (kcal): 528 (propofol)  Total Calories (kcal): 2160  Total Calories (kcal/kg): 2612  % Kcal Needs: 100  Total Protein (gm): 135  % Protein Needs: 100  IV Fluid (mL): 1764  Total Fluid Intake (mL): 1850  Energy Calories Required: meeting needs  Protein Required: meeting needs  Fluid Required: meeting needs  Tolerance: tolerating  Current Medical Diagnosis and Past Medical History  Diagnosis: gastrointestinal disease, infection/sepsis  Past Medical History:   Diagnosis Date    Hyperlipidemia     Hypertension      Nutrition/Diet History  Spiritual, Cultural Beliefs, Jainism Practices, Values that Affect Care: no  Food Allergies: NKFA  Factors Affecting Nutritional Intake: None identified at this time  Lab/Procedures/Meds  Recent Labs   Lab 03/18/22  0735   *   K 4.7   BUN 96*   CREATININE 4.32*   *   CALCIUM 7.9*   CL 91*     Last A1c: No results found for: HGBA1C  Lab Results   Component Value Date    RBC 2.10 (L) 03/18/2022    HGB 6.2 (L) 03/18/2022    HCT 19.1 (L) 03/18/2022    MCV 91.0 03/18/2022    MCH 29.5 03/18/2022    MCHC 32.5 03/18/2022     Pertinent Labs Reviewed: reviewed  Pertinent Labs Comments: Hct 19.1, Na 127, Cl 91, BUN 96, Creat 4.32, Glu 300  Pertinent Medications Reviewed: reviewed  Pertinent Medications Comments: heparin, insulin  Anthropometrics  Temp: 97.9 °F (36.6 °C)  Height Method: Stated  Height: 5' 11" (180.3 " cm)  Height (inches): 71 in  Weight Method: Bed Scale  Weight: 79 kg (174 lb 2.6 oz)  Weight (lb): 174.17 lb  Ideal Body Weight (IBW), Male: 172 lb  % Ideal Body Weight, Male (lb): 106.77 %  BMI (Calculated): 24.3  BMI Grade: 25 - 29.9 - overweight     Nutrition by Nursing  Diet/Nutrition Received: tube feeding     Diet/Feeding Assistance: none  Diet/Feeding Tolerance: good  Last Bowel Movement: 03/18/22  [REMOVED]      NG/OG Tube Sac sump 18 Fr. Left nostril-Feeding Type: continuous, by pump       Gastrostomy/Enterostomy 03/09/22 1436 Percutaneous endoscopic gastrostomy (PEG) midline feeding-Feeding Type: continuous, by pump  [REMOVED]      NG/OG Tube Sac sump 18 Fr. Left nostril-Current Rate (mL/hr): 50 mL/hr       Gastrostomy/Enterostomy 03/09/22 1436 Percutaneous endoscopic gastrostomy (PEG) midline feeding-Current Rate (mL/hr): 55 mL/hr  [REMOVED]      NG/OG Tube Sac sump 18 Fr. Left nostril-Goal Rate (mL/hr): 75 mL/hr       Gastrostomy/Enterostomy 03/09/22 1436 Percutaneous endoscopic gastrostomy (PEG) midline feeding-Goal Rate (mL/hr): 55 mL/hr  [REMOVED]      NG/OG Tube Sac sump 18 Fr. Left nostril-Formula Name: nepro 1.8       Gastrostomy/Enterostomy 03/09/22 1436 Percutaneous endoscopic gastrostomy (PEG) midline feeding-Formula Name: nepro 1.8  Nutrition Follow-Up  RD Follow-up?: Yes  Assessment and Plan  No new Assessment & Plan notes have been filed under this hospital service since the last note was generated.  Service: Nutrition

## 2022-03-18 NOTE — SUBJECTIVE & OBJECTIVE
Interval History:  No history    Objective:     Vital Signs (Most Recent):  Temp: 97.9 °F (36.6 °C) (03/18/22 0400)  Pulse: 82 (03/18/22 0500)  Resp: (!) 29 (03/18/22 0500)  BP: (!) 140/67 (03/18/22 0500)  SpO2: 100 % (03/18/22 0500)   Vital Signs (24h Range):  Temp:  [97.6 °F (36.4 °C)-99.7 °F (37.6 °C)] 97.9 °F (36.6 °C)  Pulse:  [] 82  Resp:  [10-37] 29  SpO2:  [94 %-100 %] 100 %  BP: ()/(51-80) 140/67     Weight: 79 kg (174 lb 2.6 oz)  Body mass index is 24.29 kg/m².      Intake/Output Summary (Last 24 hours) at 3/18/2022 0554  Last data filed at 3/18/2022 0400  Gross per 24 hour   Intake 2750.17 ml   Output 450 ml   Net 2300.17 ml       Physical Exam  Vitals reviewed.   Constitutional:       Appearance: Normal appearance.      Interventions: He is not intubated.     Comments: Tracheostomy, enucleation of right eye I   HENT:      Head: Normocephalic and atraumatic.      Nose: Nose normal.      Mouth/Throat:      Mouth: Mucous membranes are dry.      Pharynx: Oropharynx is clear.   Eyes:      Extraocular Movements: Extraocular movements intact.      Conjunctiva/sclera: Conjunctivae normal.      Pupils: Pupils are equal, round, and reactive to light.   Cardiovascular:      Rate and Rhythm: Normal rate.      Heart sounds: Normal heart sounds. No murmur heard.  Pulmonary:      Effort: Pulmonary effort is normal. He is not intubated.      Breath sounds: Normal breath sounds.   Abdominal:      General: Abdomen is flat. Bowel sounds are normal.      Palpations: Abdomen is soft.   Musculoskeletal:         General: Normal range of motion.      Cervical back: Normal range of motion and neck supple.      Right lower leg: No edema.      Left lower leg: No edema.   Skin:     General: Skin is warm and dry.      Capillary Refill: Capillary refill takes less than 2 seconds.   Neurological:      General: No focal deficit present.      Mental Status: He is alert and oriented to person, place, and time.   Psychiatric:          Mood and Affect: Mood normal.         Behavior: Behavior normal.       Vents:  Vent Mode: PS/CPAP (03/18/22 0009)  Set Rate: 16 BPM (03/17/22 2027)  Vt Set: 480 mL (03/17/22 2027)  Pressure Support: 12 cmH20 (03/18/22 0009)  PEEP/CPAP: 5 cmH20 (03/18/22 0009)  Oxygen Concentration (%): 35 (03/18/22 0323)  Peak Airway Pressure: 18 cmH2O (03/18/22 0009)  Plateau Pressure: 20 cmH20 (03/07/22 0500)  Total Ve: 10.9 mL (03/18/22 0009)  F/VT Ratio<105 (RSBI): (!) 34.04 (03/18/22 0009)    Lines/Drains/Airways       Peripherally Inserted Central Catheter Line  Duration             PICC Double Lumen 01/05/22 left basilic 72 days              Central Venous Catheter Line  Duration                  Hemodialysis Catheter 12/30/21 0900 right internal jugular 77 days              Drain  Duration                  Colostomy 12/18/21 1030 Descending/sigmoid LUQ 89 days         Gastrostomy/Enterostomy 03/09/22 1436 Percutaneous endoscopic gastrostomy (PEG) midline feeding 8 days              Airway  Duration                  Surgical Airway 01/04/22 Shiley 73 days                    Significant Labs:    CBC/Anemia Profile:  Recent Labs   Lab 03/17/22  1052   WBC 17.75*   HGB 5.3*   HCT 17.0*      MCV 92.9   RDW 14.6*        Chemistries:  Recent Labs   Lab 03/17/22  1052   *   K 4.7   CL 93*   CO2 24   BUN 60*   CREATININE 3.56*   CALCIUM 8.5       All pertinent labs within the past 24 hours have been reviewed.    Significant Imaging:  I have reviewed all pertinent imaging results/findings within the past 24 hours.

## 2022-03-18 NOTE — DIALYSIS ROUNDING
"          Nephrology Department            NAME: Og Garcia   YOB: 1955  MRN: [id number]  NOTE DATE: 03/18/2022     The patient is seen on dialysis.  He is tolerating the procedure.  He is to get blood today.    Patient complains:  None.      REVIEW OF SYSTEMS:  he reports no shortness of breath, nausea or vomiting. No acute changes.    VITALS:  height is 5' 11" (1.803 m) and weight is 79 kg (174 lb 2.6 oz). His temperature is 97.9 °F (36.6 °C). His blood pressure is 129/66 and his pulse is 76. His respiration is 30 (abnormal) and oxygen saturation is 100%.  Hemodialysis documentation flowsheet reviewed with dialysis nurse, including weight and vitals.     NAD.  Respiration unlabored. Lungs clear.  Heart regular, no rub.  Abdomen soft, nontender, positive bowl sounds  No edema.    Recent Labs   Lab 03/17/22  1052 03/18/22  0735   * 127*   K 4.7 4.7   CL 93* 91*   CO2 24 21   BUN 60* 96*   CREATININE 3.56* 4.32*   CALCIUM 8.5 7.9*     Recent Labs   Lab 03/17/22  1052 03/18/22  0735   WBC 17.75* 20.57*   HGB 5.3* 6.2*   HCT 17.0* 19.1*    238       ASSESSMENT/PLAN:  Continue with scheduled hemodialysis for this patient.    Edwin Pryor Jr, MD  "

## 2022-03-18 NOTE — ASSESSMENT & PLAN NOTE
- s/p multiple debridements  - wound vac in place  - ID consulted for antibiotic management: He was treated with rocephin, daptomycin, clindamycin. 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 Cazares  2/21- back to OR for skin grafting  3/9- had peg tube placed   03/18/2022 no changes

## 2022-03-18 NOTE — RESPIRATORY THERAPY
0845 placed patient on CPAP/PSV 12, 35%+5. Patient will remain on SBT as tolerated. Will continue to monitor. Nurse notified.

## 2022-03-18 NOTE — ASSESSMENT & PLAN NOTE
- 12/14 intubated, tracheostomy 1/4  - continuous PSV/CPAP, had to be switched back to AC due to apnea spells  3/14- on cpap this am and doing well. Will try for continuous cpap again. Only place back on assist control if apnea causes drop in sat  3/15- doing well and no issues overnight. Hopefully can try some trach collar in the next couple of days  3/16- Had an episode earlier today that required patient to be placed back on ventilator. He was suctioned and had improvement  Continue to push weaning trials

## 2022-03-18 NOTE — NURSING
PRBC'S completed at this time, flush started, no acute distress noted. No resp distress noted. Will continue to monitor.

## 2022-03-18 NOTE — ASSESSMENT & PLAN NOTE
2/26 -Tmax 101.4 -- resp culture - with GNB - will start Zosyn today - follow cultures   BC x 2 NGTD   2/28 - resp culture with pseudomonas and klebsiella  treat with zosyn for 7 days   Blood culture from 2/25 also growing Pseudomonas. Sensitive to zosyn. Will need to continue zosyn for at least 10 days total  3/9 He has completed antibiotics  03/17/2022 new infiltrate on the right will go ahead and repeat x-ray and culture and repeat labs white blood cell count is up no fever will continue to watch

## 2022-03-18 NOTE — NURSING
1hour post check, no adverse reaction noted from prbc's, no resp distress noted. Vitals signs stable and on blood flow sheet. Will continue to monitor.

## 2022-03-19 NOTE — PROGRESS NOTES
[facility]  Nephrology  Progress Note    Patient Name: Og Garcia  MRN: [id number]  Admission Date: 12/23/2021  Hospital Length of Stay: 86 days  Attending Provider: Cecil Abernathy DO   Primary Care Physician: Hector Arndt DNP, FNP-C  Principal Problem:Denice gangrene    Subjective:     HPI: The patient is known from the previous nephrology consult at [facility].  He is no at Specialty and continues to need dialysis support.      Interval History: The patient is doing acceptable.  He tolerated dialysis on yesterday.  He is trying to talk this morning as well.    Review of patient's allergies indicates:  No Known Allergies  Current Facility-Administered Medications   Medication Frequency    0.9%  NaCl infusion (for blood administration) Q24H PRN    0.9%  NaCl infusion PRN    acetaminophen tablet 500 mg Q6H PRN    acetic acid 0.25 % irrigation PRN    albuterol nebulizer solution 2.5 mg Q4H PRN    albuterol-ipratropium 2.5 mg-0.5 mg/3 mL nebulizer solution 3 mL Q6H    bisacodyL EC tablet 10 mg Daily PRN    calcium gluconate 1 g in dextrose 5 % in water (D5W) 5 % 100 mL IVPB (MB+) Once    collagenase ointment Daily PRN    dextromethorphan-guaiFENesin  mg/5 ml liquid 10 mL Q6H PRN    dextrose 50% injection 12.5 g PRN    diltiaZEM tablet 60 mg Q6H    fentaNYL 50 mcg/hr 1 patch Q72H    glucagon (human recombinant) injection 1 mg PRN    guaiFENesin 100 mg/5 ml syrup 200 mg Q6H    heparin (porcine) injection 4,000 Units PRN    insulin aspart U-100 injection 1-10 Units Q6H    lorazepam injection 2 mg Q6H PRN    methylPREDNISolone sodium succinate injection 40 mg Q6H    ondansetron injection 8 mg Q6H PRN    pantoprazole suspension 40 mg Daily    sevelamer carbonate pwpk 0.8 g TID WM    simethicone chewable tablet 80 mg TID PRN    ticagrelor tablet 90 mg BID       Objective:     Vital Signs (Most Recent):  Temp: 98.1 °F (36.7 °C) (03/19/22 0745)  Pulse: 84  (03/19/22 0801)  Resp: 19 (03/19/22 0801)  BP: (!) 169/85 (03/19/22 0745)  SpO2: 100 % (03/19/22 0801)  O2 Device (Oxygen Therapy): ventilator (03/19/22 0801)   Vital Signs (24h Range):  Temp:  [97.3 °F (36.3 °C)-98.9 °F (37.2 °C)] 98.1 °F (36.7 °C)  Pulse:  [] 84  Resp:  [14-35] 19  SpO2:  [99 %-100 %] 100 %  BP: (113-169)/(55-85) 169/85     Weight: 79.3 kg (174 lb 13.2 oz) (03/19/22 0600)  Body mass index is 24.38 kg/m².  Body surface area is 1.99 meters squared.    I/O last 3 completed shifts:  In: 4052.5 [Blood:1442.5; Other:500; NG/GT:2110]  Out: 2750 [Other:2500; Stool:250]    Physical Exam  Vitals reviewed.   HENT:      Head: Atraumatic.   Cardiovascular:      Rate and Rhythm: Regular rhythm.   Pulmonary:      Effort: Pulmonary effort is normal.   Abdominal:      General: Bowel sounds are normal.      Palpations: Abdomen is soft.   Neurological:      Mental Status: He is alert.       Significant Labs:  BMP:   Recent Labs   Lab 03/18/22  0735 03/18/22  1509   *  --    *  --    K 4.7  --    CL 91*  --    CO2 21  --    BUN 96*  --    CREATININE 4.32*  --    CALCIUM 7.9*  --    MG  --  2.4*     CBC:   Recent Labs   Lab 03/18/22  0735 03/18/22  2106   WBC 20.57*  --    RBC 2.10*  --    HGB 6.2* 8.0*   HCT 19.1* 24.3*     --    MCV 91.0  --    MCH 29.5  --    MCHC 32.5  --         Significant Imaging:  Labs: Reviewed    Assessment/Plan:     * Denice gangrene  Continue wound care  Wound care.  Continue with wound care  3/19/2022 Wound care management    ESRD (end stage renal disease)  Dialysis not required today   3/7/2022 Continue with scheduled hemodialysis on MWF  3/8/2022 Dialysis session is going well.  3/10/2022 Continue with dialysis support.  3/11/2022 Dialysis as scheduled.  3/15/2022 Continue with dialysis.  3/16/2022 Dialysis session is going acceptable.  3/17/2022 Continue with dialysis as scheduled.  3/19/2022  The patient does well with dialysis.        Thank you for your  consult. I will follow-up with patient. Please contact us if you have any additional questions.    Edwin Pryor Jr, MD  Nephrology  [facility]

## 2022-03-19 NOTE — SUBJECTIVE & OBJECTIVE
Interval History: The patient is doing acceptable.  He tolerated dialysis on yesterday.  He is trying to talk this morning as well.    Review of patient's allergies indicates:  No Known Allergies  Current Facility-Administered Medications   Medication Frequency    0.9%  NaCl infusion (for blood administration) Q24H PRN    0.9%  NaCl infusion PRN    acetaminophen tablet 500 mg Q6H PRN    acetic acid 0.25 % irrigation PRN    albuterol nebulizer solution 2.5 mg Q4H PRN    albuterol-ipratropium 2.5 mg-0.5 mg/3 mL nebulizer solution 3 mL Q6H    bisacodyL EC tablet 10 mg Daily PRN    calcium gluconate 1 g in dextrose 5 % in water (D5W) 5 % 100 mL IVPB (MB+) Once    collagenase ointment Daily PRN    dextromethorphan-guaiFENesin  mg/5 ml liquid 10 mL Q6H PRN    dextrose 50% injection 12.5 g PRN    diltiaZEM tablet 60 mg Q6H    fentaNYL 50 mcg/hr 1 patch Q72H    glucagon (human recombinant) injection 1 mg PRN    guaiFENesin 100 mg/5 ml syrup 200 mg Q6H    heparin (porcine) injection 4,000 Units PRN    insulin aspart U-100 injection 1-10 Units Q6H    lorazepam injection 2 mg Q6H PRN    methylPREDNISolone sodium succinate injection 40 mg Q6H    ondansetron injection 8 mg Q6H PRN    pantoprazole suspension 40 mg Daily    sevelamer carbonate pwpk 0.8 g TID WM    simethicone chewable tablet 80 mg TID PRN    ticagrelor tablet 90 mg BID       Objective:     Vital Signs (Most Recent):  Temp: 98.1 °F (36.7 °C) (03/19/22 0745)  Pulse: 84 (03/19/22 0801)  Resp: 19 (03/19/22 0801)  BP: (!) 169/85 (03/19/22 0745)  SpO2: 100 % (03/19/22 0801)  O2 Device (Oxygen Therapy): ventilator (03/19/22 0801)   Vital Signs (24h Range):  Temp:  [97.3 °F (36.3 °C)-98.9 °F (37.2 °C)] 98.1 °F (36.7 °C)  Pulse:  [] 84  Resp:  [14-35] 19  SpO2:  [99 %-100 %] 100 %  BP: (113-169)/(55-85) 169/85     Weight: 79.3 kg (174 lb 13.2 oz) (03/19/22 0600)  Body mass index is 24.38 kg/m².  Body surface area is 1.99 meters squared.    I/O last 3 completed  shifts:  In: 4052.5 [Blood:1442.5; Other:500; NG/GT:2110]  Out: 2750 [Other:2500; Stool:250]    Physical Exam  Vitals reviewed.   HENT:      Head: Atraumatic.   Cardiovascular:      Rate and Rhythm: Regular rhythm.   Pulmonary:      Effort: Pulmonary effort is normal.   Abdominal:      General: Bowel sounds are normal.      Palpations: Abdomen is soft.   Neurological:      Mental Status: He is alert.       Significant Labs:  BMP:   Recent Labs   Lab 03/18/22  0735 03/18/22  1509   *  --    *  --    K 4.7  --    CL 91*  --    CO2 21  --    BUN 96*  --    CREATININE 4.32*  --    CALCIUM 7.9*  --    MG  --  2.4*     CBC:   Recent Labs   Lab 03/18/22  0735 03/18/22  2106   WBC 20.57*  --    RBC 2.10*  --    HGB 6.2* 8.0*   HCT 19.1* 24.3*     --    MCV 91.0  --    MCH 29.5  --    MCHC 32.5  --         Significant Imaging:  Labs: Reviewed

## 2022-03-19 NOTE — PLAN OF CARE
Pt lying in bed resting. No noted distress. No voiced complaints. L picc line intact and secure with no iv complications. Fetanyl patch to right shoulder.  Safety measures ongoing.

## 2022-03-19 NOTE — PROGRESS NOTES
[facility]  Pulmonology  Progress Note    Patient Name: Og Garcia  MRN: [id number]  Admission Date: 12/23/2021  Hospital Length of Stay: 86 days  Code Status: Prior  Attending Provider: eCcil Abernathy DO  Primary Care Provider: Hector Arndt DNP, FNP-C   Principal Problem: Denice gangrene    Subjective:     Interval History:  Patient alert follows commands    Objective:     Vital Signs (Most Recent):  Temp: 98.9 °F (37.2 °C) (03/18/22 2300)  Pulse: 84 (03/19/22 0415)  Resp: 20 (03/19/22 0415)  BP: 131/70 (03/19/22 0200)  SpO2: 100 % (03/19/22 0415) Vital Signs (24h Range):  Temp:  [97.3 °F (36.3 °C)-98.9 °F (37.2 °C)] 98.9 °F (37.2 °C)  Pulse:  [] 84  Resp:  [14-35] 20  SpO2:  [99 %-100 %] 100 %  BP: (113-143)/(55-78) 131/70     Weight: 79 kg (174 lb 2.6 oz)  Body mass index is 24.29 kg/m².      Intake/Output Summary (Last 24 hours) at 3/19/2022 0553  Last data filed at 3/18/2022 2015  Gross per 24 hour   Intake 2498.33 ml   Output 2500 ml   Net -1.67 ml       Physical Exam  Vitals reviewed.   Constitutional:       Appearance: Normal appearance.      Interventions: He is not intubated.  HENT:      Head: Normocephalic and atraumatic.      Nose: Nose normal.      Mouth/Throat:      Mouth: Mucous membranes are dry.      Pharynx: Oropharynx is clear.   Eyes:      Extraocular Movements: Extraocular movements intact.      Conjunctiva/sclera: Conjunctivae normal.      Pupils: Pupils are equal, round, and reactive to light.   Cardiovascular:      Rate and Rhythm: Normal rate.      Heart sounds: Normal heart sounds. No murmur heard.  Pulmonary:      Effort: Pulmonary effort is normal. He is not intubated.      Breath sounds: Normal breath sounds.   Abdominal:      General: Abdomen is flat. Bowel sounds are normal.      Palpations: Abdomen is soft.   Musculoskeletal:         General: Normal range of motion.      Cervical back: Normal range of motion and neck supple.      Right  lower leg: No edema.      Left lower leg: No edema.   Skin:     General: Skin is warm and dry.      Capillary Refill: Capillary refill takes less than 2 seconds.   Neurological:      General: No focal deficit present.      Mental Status: He is alert and oriented to person, place, and time.   Psychiatric:         Mood and Affect: Mood normal.         Behavior: Behavior normal.       Vents:  Vent Mode: A/C (03/19/22 0415)  Set Rate: 14 BPM (03/19/22 0415)  Vt Set: 480 mL (03/19/22 0415)  Pressure Support: 12 cmH20 (03/18/22 1515)  PEEP/CPAP: 5 cmH20 (03/19/22 0415)  Oxygen Concentration (%): 35 (03/19/22 0415)  Peak Airway Pressure: 14 cmH2O (03/19/22 0415)  Plateau Pressure: 20 cmH20 (03/07/22 0500)  Total Ve: 7.6 mL (03/19/22 0415)  F/VT Ratio<105 (RSBI): (!) 57.47 (03/19/22 0415)    Lines/Drains/Airways       Peripherally Inserted Central Catheter Line  Duration             PICC Double Lumen 01/05/22 left basilic 73 days              Central Venous Catheter Line  Duration                  Hemodialysis Catheter 12/30/21 0900 right internal jugular 78 days              Drain  Duration                  Colostomy 12/18/21 1030 Descending/sigmoid LUQ 90 days         Gastrostomy/Enterostomy 03/09/22 1436 Percutaneous endoscopic gastrostomy (PEG) midline feeding 9 days              Airway  Duration                  Surgical Airway 01/04/22 Shiley 74 days                    Significant Labs:    CBC/Anemia Profile:  Recent Labs   Lab 03/17/22  1052 03/18/22  0735 03/18/22  2106   WBC 17.75* 20.57*  --    HGB 5.3* 6.2* 8.0*   HCT 17.0* 19.1* 24.3*    238  --    MCV 92.9 91.0  --    RDW 14.6* 13.7  --         Chemistries:  Recent Labs   Lab 03/17/22  1052 03/18/22  0735 03/18/22  1509   * 127*  --    K 4.7 4.7  --    CL 93* 91*  --    CO2 24 21  --    BUN 60* 96*  --    CREATININE 3.56* 4.32*  --    CALCIUM 8.5 7.9*  --    MG  --   --  2.4*       All pertinent labs within the past 24 hours have been  reviewed.    Significant Imaging:  I have reviewed all pertinent imaging results/findings within the past 24 hours.    Assessment/Plan:     * Denice gangrene  - s/p multiple debridements  - wound vac in place  - ID consulted for antibiotic management: He was treated with rocephin, daptomycin, clindamycin. 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 Cazares  2/21- back to OR for skin grafting  3/9- had peg tube placed   03/18/2022 no changes    On mechanically assisted ventilation  - 12/14 intubated, tracheostomy 1/4  - continuous PSV/CPAP, had to be switched back to AC due to apnea spells  3/14- on cpap this am and doing well. Will try for continuous cpap again. Only place back on assist control if apnea causes drop in sat  3/15- doing well and no issues overnight. Hopefully can try some trach collar in the next couple of days  3/16- Had an episode earlier today that required patient to be placed back on ventilator. He was suctioned and had improvement  Continue to push weaning trials    Hypertension  - continue diltiazem  - bp looks ok      Fever  2/26 -Tmax 101.4 -- resp culture - with GNB - will start Zosyn today - follow cultures   BC x 2 NGTD   2/28 - resp culture with pseudomonas and klebsiella  treat with zosyn for 7 days   Blood culture from 2/25 also growing Pseudomonas. Sensitive to zosyn. Will need to continue zosyn for at least 10 days total  3/9 He has completed antibiotics  03/17/2022 new infiltrate on the right will go ahead and repeat x-ray and culture and repeat labs white blood cell count is up no fever will continue to watch    ESRD (end stage renal disease)  Started on HD 12/30   Continue with HD per  nephrology      Type 2 diabetes mellitus without complication, without long-term current use of insulin  Up a little bit currently will watch no change in medicine yet      Acute blood loss anemia  No active bleeding hemoglobin is up to 8                 Jose Urban MD  Pulmonology  [facility]

## 2022-03-19 NOTE — SUBJECTIVE & OBJECTIVE
Interval History:  Patient alert follows commands    Objective:     Vital Signs (Most Recent):  Temp: 98.9 °F (37.2 °C) (03/18/22 2300)  Pulse: 84 (03/19/22 0415)  Resp: 20 (03/19/22 0415)  BP: 131/70 (03/19/22 0200)  SpO2: 100 % (03/19/22 0415) Vital Signs (24h Range):  Temp:  [97.3 °F (36.3 °C)-98.9 °F (37.2 °C)] 98.9 °F (37.2 °C)  Pulse:  [] 84  Resp:  [14-35] 20  SpO2:  [99 %-100 %] 100 %  BP: (113-143)/(55-78) 131/70     Weight: 79 kg (174 lb 2.6 oz)  Body mass index is 24.29 kg/m².      Intake/Output Summary (Last 24 hours) at 3/19/2022 0553  Last data filed at 3/18/2022 2015  Gross per 24 hour   Intake 2498.33 ml   Output 2500 ml   Net -1.67 ml       Physical Exam  Vitals reviewed.   Constitutional:       Appearance: Normal appearance.      Interventions: He is not intubated.  HENT:      Head: Normocephalic and atraumatic.      Nose: Nose normal.      Mouth/Throat:      Mouth: Mucous membranes are dry.      Pharynx: Oropharynx is clear.   Eyes:      Extraocular Movements: Extraocular movements intact.      Conjunctiva/sclera: Conjunctivae normal.      Pupils: Pupils are equal, round, and reactive to light.   Cardiovascular:      Rate and Rhythm: Normal rate.      Heart sounds: Normal heart sounds. No murmur heard.  Pulmonary:      Effort: Pulmonary effort is normal. He is not intubated.      Breath sounds: Normal breath sounds.   Abdominal:      General: Abdomen is flat. Bowel sounds are normal.      Palpations: Abdomen is soft.   Musculoskeletal:         General: Normal range of motion.      Cervical back: Normal range of motion and neck supple.      Right lower leg: No edema.      Left lower leg: No edema.   Skin:     General: Skin is warm and dry.      Capillary Refill: Capillary refill takes less than 2 seconds.   Neurological:      General: No focal deficit present.      Mental Status: He is alert and oriented to person, place, and time.   Psychiatric:         Mood and Affect: Mood normal.          Behavior: Behavior normal.       Vents:  Vent Mode: A/C (03/19/22 0415)  Set Rate: 14 BPM (03/19/22 0415)  Vt Set: 480 mL (03/19/22 0415)  Pressure Support: 12 cmH20 (03/18/22 1515)  PEEP/CPAP: 5 cmH20 (03/19/22 0415)  Oxygen Concentration (%): 35 (03/19/22 0415)  Peak Airway Pressure: 14 cmH2O (03/19/22 0415)  Plateau Pressure: 20 cmH20 (03/07/22 0500)  Total Ve: 7.6 mL (03/19/22 0415)  F/VT Ratio<105 (RSBI): (!) 57.47 (03/19/22 0415)    Lines/Drains/Airways       Peripherally Inserted Central Catheter Line  Duration             PICC Double Lumen 01/05/22 left basilic 73 days              Central Venous Catheter Line  Duration                  Hemodialysis Catheter 12/30/21 0900 right internal jugular 78 days              Drain  Duration                  Colostomy 12/18/21 1030 Descending/sigmoid LUQ 90 days         Gastrostomy/Enterostomy 03/09/22 1436 Percutaneous endoscopic gastrostomy (PEG) midline feeding 9 days              Airway  Duration                  Surgical Airway 01/04/22 Shiley 74 days                    Significant Labs:    CBC/Anemia Profile:  Recent Labs   Lab 03/17/22  1052 03/18/22  0735 03/18/22  2106   WBC 17.75* 20.57*  --    HGB 5.3* 6.2* 8.0*   HCT 17.0* 19.1* 24.3*    238  --    MCV 92.9 91.0  --    RDW 14.6* 13.7  --         Chemistries:  Recent Labs   Lab 03/17/22  1052 03/18/22  0735 03/18/22  1509   * 127*  --    K 4.7 4.7  --    CL 93* 91*  --    CO2 24 21  --    BUN 60* 96*  --    CREATININE 3.56* 4.32*  --    CALCIUM 8.5 7.9*  --    MG  --   --  2.4*       All pertinent labs within the past 24 hours have been reviewed.    Significant Imaging:  I have reviewed all pertinent imaging results/findings within the past 24 hours.

## 2022-03-19 NOTE — ASSESSMENT & PLAN NOTE
Dialysis not required today   3/7/2022 Continue with scheduled hemodialysis on MWF  3/8/2022 Dialysis session is going well.  3/10/2022 Continue with dialysis support.  3/11/2022 Dialysis as scheduled.  3/15/2022 Continue with dialysis.  3/16/2022 Dialysis session is going acceptable.  3/17/2022 Continue with dialysis as scheduled.  3/19/2022  The patient does well with dialysis.

## 2022-03-20 NOTE — PROGRESS NOTES
[facility] [place] [facility]""  Pulmonology  Progress Note    Patient Name: Og Garcia  MRN: [id number]  Admission Date: 12/23/2021  Hospital Length of Stay: 87 days  Code Status: Prior  Attending Provider: Cecil Abernathy DO  Primary Care Provider: Hector Arndt DNP, FNP-C   Principal Problem: Denice gangrene    Subjective:     Interval History:  Patient resting    Objective:     Vital Signs (Most Recent):  Temp: 98.6 °F (37 °C) (03/20/22 0300)  Pulse: 84 (03/20/22 0416)  Resp: 18 (03/20/22 0416)  BP: (!) 154/84 (03/20/22 0610)  SpO2: 100 % (03/20/22 0416)   Vital Signs (24h Range):  Temp:  [97.7 °F (36.5 °C)-98.7 °F (37.1 °C)] 98.6 °F (37 °C)  Pulse:  [] 84  Resp:  [14-25] 18  SpO2:  [100 %] 100 %  BP: (137-169)/(69-94) 154/84     Weight: 79.3 kg (174 lb 13.2 oz)  Body mass index is 24.38 kg/m².      Intake/Output Summary (Last 24 hours) at 3/20/2022 0621  Last data filed at 3/19/2022 0750  Gross per 24 hour   Intake 100 ml   Output --   Net 100 ml       Physical Exam  Vitals reviewed.   Constitutional:       Appearance: Normal appearance.      Interventions: He is not intubated.     Comments: Tracheostomy   HENT:      Head: Normocephalic and atraumatic.      Nose: Nose normal.      Mouth/Throat:      Mouth: Mucous membranes are dry.      Pharynx: Oropharynx is clear.   Eyes:      Extraocular Movements: Extraocular movements intact.      Conjunctiva/sclera: Conjunctivae normal.      Pupils: Pupils are equal, round, and reactive to light.   Cardiovascular:      Rate and Rhythm: Normal rate.      Heart sounds: Normal heart sounds. No murmur heard.  Pulmonary:      Effort: Pulmonary effort is normal. He is not intubated.      Breath sounds: Normal breath sounds.   Abdominal:      General: Abdomen is flat. Bowel sounds are normal.      Palpations: Abdomen is soft.   Musculoskeletal:         General: Normal range of motion.      Cervical back: Normal range of motion and neck supple.      Right  lower leg: No edema.      Left lower leg: No edema.   Skin:     General: Skin is warm and dry.      Capillary Refill: Capillary refill takes less than 2 seconds.   Neurological:      General: No focal deficit present.      Mental Status: He is alert and oriented to person, place, and time.   Psychiatric:         Mood and Affect: Mood normal.         Behavior: Behavior normal.       Vents:  Vent Mode: A/C (03/20/22 0416)  Set Rate: 14 BPM (03/20/22 0416)  Vt Set: 480 mL (03/20/22 0416)  Pressure Support: 12 cmH20 (03/19/22 1216)  PEEP/CPAP: 5 cmH20 (03/20/22 0416)  Oxygen Concentration (%): 35 (03/20/22 0416)  Peak Airway Pressure: 10 cmH2O (03/20/22 0416)  Plateau Pressure: 20 cmH20 (03/07/22 0500)  Total Ve: 6.8 mL (03/20/22 0416)  F/VT Ratio<105 (RSBI): (!) 52.63 (03/20/22 0416)    Lines/Drains/Airways       Peripherally Inserted Central Catheter Line  Duration             PICC Double Lumen 01/05/22 left basilic 74 days              Central Venous Catheter Line  Duration                  Hemodialysis Catheter 12/30/21 0900 right internal jugular 79 days              Drain  Duration                  Colostomy 12/18/21 1030 Descending/sigmoid LUQ 91 days         Gastrostomy/Enterostomy 03/09/22 1436 Percutaneous endoscopic gastrostomy (PEG) midline feeding 10 days              Airway  Duration                  Surgical Airway 01/04/22 Shiley 75 days                    Significant Labs:    CBC/Anemia Profile:  Recent Labs   Lab 03/18/22  0735 03/18/22  2106   WBC 20.57*  --    HGB 6.2* 8.0*   HCT 19.1* 24.3*     --    MCV 91.0  --    RDW 13.7  --         Chemistries:  Recent Labs   Lab 03/18/22  0735 03/18/22  1509   *  --    K 4.7  --    CL 91*  --    CO2 21  --    BUN 96*  --    CREATININE 4.32*  --    CALCIUM 7.9*  --    MG  --  2.4*       All pertinent labs within the past 24 hours have been reviewed.    Significant Imaging:  I have reviewed all pertinent imaging results/findings within the past 24  hours.    Assessment/Plan:     * Denice gangrene  - s/p multiple debridements  - wound vac in place  - ID consulted for antibiotic management: He was treated with rocephin, daptomycin, clindamycin. 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 Cazares  2/21- back to OR for skin grafting  3/9- had peg tube placed   03/18/2022 no changes    On mechanically assisted ventilation  - 12/14 intubated, tracheostomy 1/4  - continuous PSV/CPAP, had to be switched back to AC due to apnea spells  3/14- on cpap this am and doing well. Will try for continuous cpap again. Only place back on assist control if apnea causes drop in sat  3/15- doing well and no issues overnight. Hopefully can try some trach collar in the next couple of days  3/16- Had an episode earlier today that required patient to be placed back on ventilator. He was suctioned and had improvement  Continue to push weaning trials will push to 14 hours today    Hypertension  Blood pressure borderline review      Fever  2/26 -Tmax 101.4 -- resp culture - with GNB - will start Zosyn today - follow cultures   BC x 2 NGTD   2/28 - resp culture with pseudomonas and klebsiella  treat with zosyn for 7 days   Blood culture from 2/25 also growing Pseudomonas. Sensitive to zosyn. Will need to continue zosyn for at least 10 days total  3/9 He has completed antibiotics  03/20/2022 white count was elevated last check blood cultures pending no change in medicines    ESRD (end stage renal disease)  Started on HD 12/30   Continue with HD per nephrology      Type 2 diabetes mellitus without complication, without long-term current use of insulin  Adjust medicines today      Acute blood loss  anemia  No active bleeding hemoglobin is up to 8                 Jose Urban MD  Pulmonology  [facility]

## 2022-03-20 NOTE — PROGRESS NOTES
[facility]  Nephrology  Progress Note    Patient Name: Og Garcia  MRN: [id number]  Admission Date: 12/23/2021  Hospital Length of Stay: 87 days  Attending Provider: Cecil Abernathy DO   Primary Care Physician: Hector Arndt DNP, FNP-C  Principal Problem:Denice gangrene    Subjective:     HPI: The patient is known from the previous nephrology consult at [facility].  He is no at Specialty and continues to need dialysis support.      Interval History: The patient is resting.  No acute changes.      Review of patient's allergies indicates:  No Known Allergies  Current Facility-Administered Medications   Medication Frequency    0.9%  NaCl infusion PRN    acetaminophen tablet 500 mg Q6H PRN    acetic acid 0.25 % irrigation PRN    albuterol nebulizer solution 2.5 mg Q4H PRN    albuterol-ipratropium 2.5 mg-0.5 mg/3 mL nebulizer solution 3 mL Q6H    bisacodyL EC tablet 10 mg Daily PRN    calcium gluconate 1 g in dextrose 5 % in water (D5W) 5 % 100 mL IVPB (MB+) Once    collagenase ointment Daily PRN    dextrose 50% injection 12.5 g PRN    diltiaZEM tablet 90 mg Q6H    fentaNYL 50 mcg/hr 1 patch Q72H    glucagon (human recombinant) injection 1 mg PRN    guaiFENesin 100 mg/5 ml syrup 200 mg Q6H    heparin (porcine) injection 4,000 Units PRN    insulin aspart U-100 injection 1-10 Units Q6H    insulin detemir U-100 injection 20 Units QHS    methylPREDNISolone sodium succinate injection 20 mg TID    ondansetron injection 8 mg Q6H PRN    pantoprazole suspension 40 mg Daily    sevelamer carbonate pwpk 0.8 g TID WM    simethicone chewable tablet 80 mg TID PRN    ticagrelor tablet 90 mg BID       Objective:     Vital Signs (Most Recent):  Temp: 98.7 °F (37.1 °C) (03/20/22 0800)  Pulse: 75 (03/20/22 1200)  Resp: 13 (03/20/22 1200)  BP: 135/66 (03/20/22 1100)  SpO2: 100 % (03/20/22 1200)  O2 Device (Oxygen Therapy): ventilator (03/20/22 1200) Vital Signs (24h Range):  Temp:  [97.7  °F (36.5 °C)-98.7 °F (37.1 °C)] 98.7 °F (37.1 °C)  Pulse:  [] 75  Resp:  [13-27] 13  SpO2:  [100 %] 100 %  BP: (135-172)/(66-94) 135/66     Weight: 79.3 kg (174 lb 13.2 oz) (03/19/22 0600)  Body mass index is 24.38 kg/m².  Body surface area is 1.99 meters squared.    I/O last 3 completed shifts:  In: 1838.3 [Blood:1038.3; Other:100; NG/GT:700]  Out: -     Physical Exam  Vitals reviewed.   HENT:      Head: Atraumatic.   Cardiovascular:      Rate and Rhythm: Regular rhythm.      Pulses: Normal pulses.   Pulmonary:      Effort: Pulmonary effort is normal.   Abdominal:      General: Abdomen is flat.       Significant Labs:  BMP:   Recent Labs   Lab 03/18/22  0735 03/18/22  1509   *  --    *  --    K 4.7  --    CL 91*  --    CO2 21  --    BUN 96*  --    CREATININE 4.32*  --    CALCIUM 7.9*  --    MG  --  2.4*     CBC:   Recent Labs   Lab 03/18/22  0735 03/18/22  2106   WBC 20.57*  --    RBC 2.10*  --    HGB 6.2* 8.0*   HCT 19.1* 24.3*     --    MCV 91.0  --    MCH 29.5  --    MCHC 32.5  --         Significant Imaging:  Labs: Reviewed    Assessment/Plan:     ESRD (end stage renal disease)  Dialysis not required today   3/7/2022 Continue with scheduled hemodialysis on MWF  3/8/2022 Dialysis session is going well.  3/10/2022 Continue with dialysis support.  3/11/2022 Dialysis as scheduled.  3/15/2022 Continue with dialysis.  3/16/2022 Dialysis session is going acceptable.  3/17/2022 Continue with dialysis as scheduled.  3/19/2022  The patient does well with dialysis.  3/20/2022 Continue with scheduled hemodialysis.    Necrotizing fasciitis  Continue wound care and antibiotics  3/10/2022 Continue with wound care  3/20/2022 Wound care        Thank you for your consult. I will follow-up with patient. Please contact us if you have any additional questions.    Edwin Pryor Jr, MD  Nephrology  [facility]

## 2022-03-20 NOTE — NURSING
Pt lying in bed resting.  No noted distress. No s/s pain or SOB. Skin warm to touch. Respiration even and unlabored. Trach midline and intact with no complications at this time.Vent settings checked. Peg tube intact and secure with Nepro @ 55 ml/hr  With no complications. Colostomy intact and secure with no complications.Dressing to abdomen,sacral and bilateral feet area. Right picc line intact and secure with no iv complications.  Fetanyl patch to the left shoulder. Safety measures ongoing.    0010 Lying in bed resting. No acute changes. All lines and tube intact and secure.    0410 Lying in bed resting. Safety measures ongoing.    0530  rounding    0615 Lying in bed resting.  No noted distress. Safety measures ongoing.

## 2022-03-20 NOTE — ASSESSMENT & PLAN NOTE
2/26 -Tmax 101.4 -- resp culture - with GNB - will start Zosyn today - follow cultures   BC x 2 NGTD   2/28 - resp culture with pseudomonas and klebsiella  treat with zosyn for 7 days   Blood culture from 2/25 also growing Pseudomonas. Sensitive to zosyn. Will need to continue zosyn for at least 10 days total  3/9 He has completed antibiotics  03/20/2022 white count was elevated last check blood cultures pending no change in medicines

## 2022-03-20 NOTE — ASSESSMENT & PLAN NOTE
- 12/14 intubated, tracheostomy 1/4  - continuous PSV/CPAP, had to be switched back to AC due to apnea spells  3/14- on cpap this am and doing well. Will try for continuous cpap again. Only place back on assist control if apnea causes drop in sat  3/15- doing well and no issues overnight. Hopefully can try some trach collar in the next couple of days  3/16- Had an episode earlier today that required patient to be placed back on ventilator. He was suctioned and had improvement  Continue to push weaning trials will push to 14 hours today

## 2022-03-20 NOTE — SUBJECTIVE & OBJECTIVE
Interval History: The patient is resting.  No acute changes.      Review of patient's allergies indicates:  No Known Allergies  Current Facility-Administered Medications   Medication Frequency    0.9%  NaCl infusion PRN    acetaminophen tablet 500 mg Q6H PRN    acetic acid 0.25 % irrigation PRN    albuterol nebulizer solution 2.5 mg Q4H PRN    albuterol-ipratropium 2.5 mg-0.5 mg/3 mL nebulizer solution 3 mL Q6H    bisacodyL EC tablet 10 mg Daily PRN    calcium gluconate 1 g in dextrose 5 % in water (D5W) 5 % 100 mL IVPB (MB+) Once    collagenase ointment Daily PRN    dextrose 50% injection 12.5 g PRN    diltiaZEM tablet 90 mg Q6H    fentaNYL 50 mcg/hr 1 patch Q72H    glucagon (human recombinant) injection 1 mg PRN    guaiFENesin 100 mg/5 ml syrup 200 mg Q6H    heparin (porcine) injection 4,000 Units PRN    insulin aspart U-100 injection 1-10 Units Q6H    insulin detemir U-100 injection 20 Units QHS    methylPREDNISolone sodium succinate injection 20 mg TID    ondansetron injection 8 mg Q6H PRN    pantoprazole suspension 40 mg Daily    sevelamer carbonate pwpk 0.8 g TID WM    simethicone chewable tablet 80 mg TID PRN    ticagrelor tablet 90 mg BID       Objective:     Vital Signs (Most Recent):  Temp: 98.7 °F (37.1 °C) (03/20/22 0800)  Pulse: 75 (03/20/22 1200)  Resp: 13 (03/20/22 1200)  BP: 135/66 (03/20/22 1100)  SpO2: 100 % (03/20/22 1200)  O2 Device (Oxygen Therapy): ventilator (03/20/22 1200) Vital Signs (24h Range):  Temp:  [97.7 °F (36.5 °C)-98.7 °F (37.1 °C)] 98.7 °F (37.1 °C)  Pulse:  [] 75  Resp:  [13-27] 13  SpO2:  [100 %] 100 %  BP: (135-172)/(66-94) 135/66     Weight: 79.3 kg (174 lb 13.2 oz) (03/19/22 0600)  Body mass index is 24.38 kg/m².  Body surface area is 1.99 meters squared.    I/O last 3 completed shifts:  In: 1838.3 [Blood:1038.3; Other:100; NG/GT:700]  Out: -     Physical Exam  Vitals reviewed.   HENT:      Head: Atraumatic.   Cardiovascular:      Rate and Rhythm: Regular rhythm.       Pulses: Normal pulses.   Pulmonary:      Effort: Pulmonary effort is normal.   Abdominal:      General: Abdomen is flat.       Significant Labs:  BMP:   Recent Labs   Lab 03/18/22  0735 03/18/22  1509   *  --    *  --    K 4.7  --    CL 91*  --    CO2 21  --    BUN 96*  --    CREATININE 4.32*  --    CALCIUM 7.9*  --    MG  --  2.4*     CBC:   Recent Labs   Lab 03/18/22  0735 03/18/22  2106   WBC 20.57*  --    RBC 2.10*  --    HGB 6.2* 8.0*   HCT 19.1* 24.3*     --    MCV 91.0  --    MCH 29.5  --    MCHC 32.5  --         Significant Imaging:  Labs: Reviewed

## 2022-03-20 NOTE — ASSESSMENT & PLAN NOTE
Dialysis not required today   3/7/2022 Continue with scheduled hemodialysis on MWF  3/8/2022 Dialysis session is going well.  3/10/2022 Continue with dialysis support.  3/11/2022 Dialysis as scheduled.  3/15/2022 Continue with dialysis.  3/16/2022 Dialysis session is going acceptable.  3/17/2022 Continue with dialysis as scheduled.  3/19/2022  The patient does well with dialysis.  3/20/2022 Continue with scheduled hemodialysis.

## 2022-03-20 NOTE — SUBJECTIVE & OBJECTIVE
Interval History:  Patient resting    Objective:     Vital Signs (Most Recent):  Temp: 98.6 °F (37 °C) (03/20/22 0300)  Pulse: 84 (03/20/22 0416)  Resp: 18 (03/20/22 0416)  BP: (!) 154/84 (03/20/22 0610)  SpO2: 100 % (03/20/22 0416)   Vital Signs (24h Range):  Temp:  [97.7 °F (36.5 °C)-98.7 °F (37.1 °C)] 98.6 °F (37 °C)  Pulse:  [] 84  Resp:  [14-25] 18  SpO2:  [100 %] 100 %  BP: (137-169)/(69-94) 154/84     Weight: 79.3 kg (174 lb 13.2 oz)  Body mass index is 24.38 kg/m².      Intake/Output Summary (Last 24 hours) at 3/20/2022 0621  Last data filed at 3/19/2022 0750  Gross per 24 hour   Intake 100 ml   Output --   Net 100 ml       Physical Exam  Vitals reviewed.   Constitutional:       Appearance: Normal appearance.      Interventions: He is not intubated.     Comments: Tracheostomy   HENT:      Head: Normocephalic and atraumatic.      Nose: Nose normal.      Mouth/Throat:      Mouth: Mucous membranes are dry.      Pharynx: Oropharynx is clear.   Eyes:      Extraocular Movements: Extraocular movements intact.      Conjunctiva/sclera: Conjunctivae normal.      Pupils: Pupils are equal, round, and reactive to light.   Cardiovascular:      Rate and Rhythm: Normal rate.      Heart sounds: Normal heart sounds. No murmur heard.  Pulmonary:      Effort: Pulmonary effort is normal. He is not intubated.      Breath sounds: Normal breath sounds.   Abdominal:      General: Abdomen is flat. Bowel sounds are normal.      Palpations: Abdomen is soft.   Musculoskeletal:         General: Normal range of motion.      Cervical back: Normal range of motion and neck supple.      Right lower leg: No edema.      Left lower leg: No edema.   Skin:     General: Skin is warm and dry.      Capillary Refill: Capillary refill takes less than 2 seconds.   Neurological:      General: No focal deficit present.      Mental Status: He is alert and oriented to person, place, and time.   Psychiatric:         Mood and Affect: Mood normal.          Behavior: Behavior normal.       Vents:  Vent Mode: A/C (03/20/22 0416)  Set Rate: 14 BPM (03/20/22 0416)  Vt Set: 480 mL (03/20/22 0416)  Pressure Support: 12 cmH20 (03/19/22 1216)  PEEP/CPAP: 5 cmH20 (03/20/22 0416)  Oxygen Concentration (%): 35 (03/20/22 0416)  Peak Airway Pressure: 10 cmH2O (03/20/22 0416)  Plateau Pressure: 20 cmH20 (03/07/22 0500)  Total Ve: 6.8 mL (03/20/22 0416)  F/VT Ratio<105 (RSBI): (!) 52.63 (03/20/22 0416)    Lines/Drains/Airways       Peripherally Inserted Central Catheter Line  Duration             PICC Double Lumen 01/05/22 left basilic 74 days              Central Venous Catheter Line  Duration                  Hemodialysis Catheter 12/30/21 0900 right internal jugular 79 days              Drain  Duration                  Colostomy 12/18/21 1030 Descending/sigmoid LUQ 91 days         Gastrostomy/Enterostomy 03/09/22 1436 Percutaneous endoscopic gastrostomy (PEG) midline feeding 10 days              Airway  Duration                  Surgical Airway 01/04/22 Shiley 75 days                    Significant Labs:    CBC/Anemia Profile:  Recent Labs   Lab 03/18/22  0735 03/18/22  2106   WBC 20.57*  --    HGB 6.2* 8.0*   HCT 19.1* 24.3*     --    MCV 91.0  --    RDW 13.7  --         Chemistries:  Recent Labs   Lab 03/18/22  0735 03/18/22  1509   *  --    K 4.7  --    CL 91*  --    CO2 21  --    BUN 96*  --    CREATININE 4.32*  --    CALCIUM 7.9*  --    MG  --  2.4*       All pertinent labs within the past 24 hours have been reviewed.    Significant Imaging:  I have reviewed all pertinent imaging results/findings within the past 24 hours.

## 2022-03-21 NOTE — PLAN OF CARE
Problem: Adult Inpatient Plan of Care  Goal: Plan of Care Review  Outcome: Ongoing, Progressing     Problem: Communication Impairment (Mechanical Ventilation, Invasive)  Goal: Effective Communication  Outcome: Ongoing, Progressing     Problem: Device-Related Complication Risk (Mechanical Ventilation, Invasive)  Goal: Optimal Device Function  Outcome: Ongoing, Progressing     Problem: Inability to Wean (Mechanical Ventilation, Invasive)  Goal: Mechanical Ventilation Liberation  Outcome: Ongoing, Progressing     Problem: Nutrition Impairment (Mechanical Ventilation, Invasive)  Goal: Optimal Nutrition Delivery  Outcome: Ongoing, Progressing     Problem: Skin and Tissue Injury (Mechanical Ventilation, Invasive)  Goal: Absence of Device-Related Skin and Tissue Injury  Outcome: Ongoing, Progressing     Problem: Ventilator-Induced Lung Injury (Mechanical Ventilation, Invasive)  Goal: Absence of Ventilator-Induced Lung Injury  Outcome: Ongoing, Progressing     Problem: Communication Impairment (Artificial Airway)  Goal: Effective Communication  Outcome: Ongoing, Progressing     Problem: Device-Related Complication Risk (Artificial Airway)  Goal: Optimal Device Function  Outcome: Ongoing, Progressing     Problem: Skin and Tissue Injury (Artificial Airway)  Goal: Absence of Device-Related Skin or Tissue Injury  Outcome: Ongoing, Progressing     Problem: Gas Exchange Impaired  Goal: Optimal Gas Exchange  Outcome: Ongoing, Progressing

## 2022-03-21 NOTE — SUBJECTIVE & OBJECTIVE
Interval History: Asked to look at ulcer, consider debridement    Medications:  Continuous Infusions:  Scheduled Meds:   albuterol-ipratropium  3 mL Nebulization Q6H    calcium gluconate IVPB  1 g Intravenous Once    diltiaZEM  90 mg Per NG tube Q6H    fentaNYL  1 patch Transdermal Q72H    guaiFENesin 100 mg/5 ml  200 mg Per NG tube Q6H    insulin aspart U-100  1-10 Units Subcutaneous Q6H    insulin detemir U-100  20 Units Subcutaneous QHS    methylPREDNISolone sodium succinate injection  20 mg Intravenous TID    pantoprazole  40 mg Per NG tube Daily    sevelamer carbonate  0.8 g Per NG tube TID WM    ticagrelor  90 mg Per G Tube BID     PRN Meds:sodium chloride 0.9%, acetaminophen, acetic acid, albuterol sulfate, bisacodyL, collagenase, dextrose 50%, glucagon (human recombinant), heparin (porcine), ondansetron, simethicone     Review of patient's allergies indicates:  No Known Allergies  Objective:     Vital Signs (Most Recent):  Temp: 98.5 °F (36.9 °C) (03/21/22 0400)  Pulse: 87 (03/21/22 0400)  Resp: 17 (03/21/22 0400)  BP: (!) 141/77 (03/21/22 0625)  SpO2: 100 % (03/21/22 0400)   Vital Signs (24h Range):  Temp:  [97.6 °F (36.4 °C)-98.8 °F (37.1 °C)] 98.5 °F (36.9 °C)  Pulse:  [] 87  Resp:  [10-24] 17  SpO2:  [100 %] 100 %  BP: (127-162)/(62-86) 141/77     Weight: 79.3 kg (174 lb 13.2 oz)  Body mass index is 24.38 kg/m².    Intake/Output - Last 3 Shifts         03/19 0700 03/20 0659 03/20 0700 03/21 0659 03/21 0700 03/22 0659    Blood       Other 100 100     NG/GT  450     Total Intake(mL/kg) 100 (1.3) 550 (6.9)     Other       Total Output       Net +100 +550            Urine Occurrence  1 x             Physical Exam  Cardiovascular:      Rate and Rhythm: Normal rate.   Abdominal:      Palpations: Abdomen is soft.   Skin:     Comments: Perianal and sacral Ulcer with necrotic tissue, pseudomonas discoloration in portions, 12cm in greatest dimension   Neurological:      General: No focal deficit present.       Mental Status: He is alert.       Significant Labs:  CBC:   Recent Labs   Lab 03/18/22  0735 03/18/22  2106   WBC 20.57*  --    RBC 2.10*  --    HGB 6.2* 8.0*   HCT 19.1* 24.3*     --    MCV 91.0  --    MCH 29.5  --    MCHC 32.5  --      BMP:   Recent Labs   Lab 03/18/22  0735 03/18/22  1509   *  --    *  --    K 4.7  --    CL 91*  --    CO2 21  --    BUN 96*  --    CREATININE 4.32*  --    CALCIUM 7.9*  --    MG  --  2.4*       Significant Diagnostics:  I have reviewed all pertinent imaging results/findings within the past 24 hours.  .

## 2022-03-21 NOTE — PROGRESS NOTES
0842 Placed patient on CPAP/PSV 12, 35%+5. Patient will continue with SBT as tolerated. HR, 81,  RR 15, O2 100, /74.

## 2022-03-21 NOTE — HPI
67 y/o well known to Dr. Cazares from prior debridements, colostomy and other procedures.  I was asked to evaluate sacral ulcer, for possible debridement.

## 2022-03-21 NOTE — ASSESSMENT & PLAN NOTE
Dialysis not required today   3/7/2022 Continue with scheduled hemodialysis on MWF  3/8/2022 Dialysis session is going well.  3/10/2022 Continue with dialysis support.  3/11/2022 Dialysis as scheduled.  3/15/2022 Continue with dialysis.  3/16/2022 Dialysis session is going acceptable.  3/17/2022 Continue with dialysis as scheduled.  3/19/2022  The patient does well with dialysis.  3/20/2022 Continue with scheduled hemodialysis.  3/21/2022  Dialysis as tolerated.

## 2022-03-21 NOTE — NURSING
Spoke to Dr. Cazares about holding patients Brilinta prior to debridement of sacral. Order given to hold.

## 2022-03-21 NOTE — PROGRESS NOTES
[facility]  Nephrology  Progress Note    Patient Name: Og Garcia  MRN: [id number]  Admission Date: 12/23/2021  Hospital Length of Stay: 88 days  Attending Provider: Cecil Abernathy DO   Primary Care Physician: Hector Arndt DNP, FNP-C  Principal Problem:Denice gangrene    Subjective:     HPI: The patient is known from the previous nephrology consult at [facility].  He is no at Specialty and continues to need dialysis support.      Interval History: The patient is the same.  No acute changes.  No fevers or chills.    Review of patient's allergies indicates:  No Known Allergies  Current Facility-Administered Medications   Medication Frequency    0.9%  NaCl infusion PRN    acetaminophen tablet 500 mg Q6H PRN    acetic acid 0.25 % irrigation PRN    albuterol nebulizer solution 2.5 mg Q4H PRN    albuterol-ipratropium 2.5 mg-0.5 mg/3 mL nebulizer solution 3 mL Q6H    bisacodyL EC tablet 10 mg Daily PRN    calcium gluconate 1 g in dextrose 5 % in water (D5W) 5 % 100 mL IVPB (MB+) Once    collagenase ointment Daily PRN    dextrose 50% injection 12.5 g PRN    diltiaZEM tablet 90 mg Q6H    fentaNYL 50 mcg/hr 1 patch Q72H    glucagon (human recombinant) injection 1 mg PRN    guaiFENesin 100 mg/5 ml syrup 200 mg Q6H    heparin (porcine) injection 4,000 Units PRN    insulin aspart U-100 injection 1-10 Units Q6H    insulin detemir U-100 injection 20 Units QHS    methylPREDNISolone sodium succinate injection 20 mg TID    mupirocin 2 % ointment BID    ondansetron injection 8 mg Q6H PRN    pantoprazole suspension 40 mg Daily    sevelamer carbonate pwpk 0.8 g TID WM    simethicone chewable tablet 80 mg TID PRN       Objective:     Vital Signs (Most Recent):  Temp: 98.5 °F (36.9 °C) (03/21/22 0400)  Pulse: 76 (03/21/22 1200)  Resp: 11 (03/21/22 1200)  BP: 110/66 (03/21/22 1200)  SpO2: 100 % (03/21/22 1200)  O2 Device (Oxygen Therapy): ventilator (03/21/22 1110) Vital Signs (24h  Range):  Temp:  [97.6 °F (36.4 °C)-98.8 °F (37.1 °C)] 98.5 °F (36.9 °C)  Pulse:  [] 76  Resp:  [10-18] 11  SpO2:  [100 %] 100 %  BP: (104-156)/(65-84) 110/66     Weight: 80.8 kg (178 lb 2.1 oz) (03/21/22 0600)  Body mass index is 24.84 kg/m².  Body surface area is 2.01 meters squared.    I/O last 3 completed shifts:  In: 1550 [Other:100; NG/GT:1450]  Out: 100 [Stool:100]    Physical Exam  Vitals reviewed.   HENT:      Head: Atraumatic.   Cardiovascular:      Rate and Rhythm: Regular rhythm.      Heart sounds: Normal heart sounds.   Pulmonary:      Effort: Pulmonary effort is normal.      Comments: Trach collar  Abdominal:      Palpations: Abdomen is soft.   Neurological:      Mental Status: He is alert.       Significant Labs:  BMP:   Recent Labs   Lab 03/18/22  0735 03/18/22  1509   *  --    *  --    K 4.7  --    CL 91*  --    CO2 21  --    BUN 96*  --    CREATININE 4.32*  --    CALCIUM 7.9*  --    MG  --  2.4*     CBC:   Recent Labs   Lab 03/18/22  0735 03/18/22  2106   WBC 20.57*  --    RBC 2.10*  --    HGB 6.2* 8.0*   HCT 19.1* 24.3*     --    MCV 91.0  --    MCH 29.5  --    MCHC 32.5  --         Significant Imaging:  Labs: Reviewed    Assessment/Plan:     ESRD (end stage renal disease)  Dialysis not required today   3/7/2022 Continue with scheduled hemodialysis on MWF  3/8/2022 Dialysis session is going well.  3/10/2022 Continue with dialysis support.  3/11/2022 Dialysis as scheduled.  3/15/2022 Continue with dialysis.  3/16/2022 Dialysis session is going acceptable.  3/17/2022 Continue with dialysis as scheduled.  3/19/2022  The patient does well with dialysis.  3/20/2022 Continue with scheduled hemodialysis.  3/21/2022  Dialysis as tolerated.    On mechanically assisted ventilation  Wean as tolerated  The patient continues to do CPAP trials.        Thank you for your consult. I will follow-up with patient. Please contact us if you have any additional questions.    Edwin Pryor Jr,  MD  Nephrology  [facility]

## 2022-03-21 NOTE — ASSESSMENT & PLAN NOTE
- s/p multiple debridements  - wound vac in place  - ID consulted for antibiotic management: He was treated with rocephin, daptomycin, clindamycin. 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 Cazares  2/21- back to OR for skin grafting  3/9- had peg tube placed   03/21/2022 no changes

## 2022-03-21 NOTE — PROGRESS NOTES
Placed patient back on the vent with previous settings for rest.  Patient's VT decreased and heart rate increased to 124.  After I put patient back on the vent with previous settings patient vital became normal.

## 2022-03-21 NOTE — NURSING
Lying in bed resting.  No noted distress. Skin warm to touch. Respiration and even and unlabored.Trach midline and intact. Pt on CPAP on the vent.  Lung sounds coarse.Peg tube intact and secure with Nepro @ 55ml/hr. Colostomy intact and secure with stool observed. Dressing to abdomen, sacral and bilateral feet intact and secure. Left picc line intact and secure with no iv complications.  Right chest wall HD cath intact and secure. Fetanyl patch to left shoulder intact. Safety measures ongoing.    0200 Lying in bed resting.  No noted distress.  Pt received bath. Safety ongoing.     0300 Pt placed back on AC vent settings on the vent due to pt appears to beginning to look as if he tiring out    0715 New bottle of tube feeding/new tubing hung. Safety measures ongoing.

## 2022-03-21 NOTE — PROGRESS NOTES
[facility]  Pulmonology  Progress Note    Patient Name: Og Garcia  MRN: [id number]  Admission Date: 12/23/2021  Hospital Length of Stay: 88 days  Code Status: Prior  Attending Provider: Cecil Abernathy DO  Primary Care Provider: Hector Arndt DNP, FNP-C   Principal Problem: Denice gangrene    Subjective:     Interval History:  Patient without complaints    Objective:     Vital Signs (Most Recent):  Temp: 98.5 °F (36.9 °C) (03/21/22 0400)  Pulse: 87 (03/21/22 0400)  Resp: 17 (03/21/22 0400)  BP: (!) 141/77 (03/21/22 0625)  SpO2: 100 % (03/21/22 0400)   Vital Signs (24h Range):  Temp:  [97.6 °F (36.4 °C)-98.8 °F (37.1 °C)] 98.5 °F (36.9 °C)  Pulse:  [] 87  Resp:  [10-24] 17  SpO2:  [100 %] 100 %  BP: (127-162)/(62-86) 141/77     Weight: 79.3 kg (174 lb 13.2 oz)  Body mass index is 24.38 kg/m².      Intake/Output Summary (Last 24 hours) at 3/21/2022 0720  Last data filed at 3/20/2022 1550  Gross per 24 hour   Intake 550 ml   Output --   Net 550 ml       Physical Exam  Vitals reviewed.   Constitutional:       Appearance: Normal appearance.      Interventions: He is not intubated.  HENT:      Head: Normocephalic and atraumatic.      Nose: Nose normal.      Mouth/Throat:      Mouth: Mucous membranes are dry.      Pharynx: Oropharynx is clear.   Eyes:      Extraocular Movements: Extraocular movements intact.      Conjunctiva/sclera: Conjunctivae normal.      Pupils: Pupils are equal, round, and reactive to light.   Cardiovascular:      Rate and Rhythm: Normal rate.      Heart sounds: Normal heart sounds. No murmur heard.  Pulmonary:      Effort: Pulmonary effort is normal. He is not intubated.      Breath sounds: Normal breath sounds.   Abdominal:      General: Abdomen is flat. Bowel sounds are normal.      Palpations: Abdomen is soft.   Musculoskeletal:         General: Normal range of motion.      Cervical back: Normal range of motion and neck supple.      Right lower leg: No  edema.      Left lower leg: No edema.   Skin:     General: Skin is warm and dry.      Capillary Refill: Capillary refill takes less than 2 seconds.   Neurological:      General: No focal deficit present.      Mental Status: He is alert and oriented to person, place, and time.   Psychiatric:         Mood and Affect: Mood normal.         Behavior: Behavior normal.       Vents:  Vent Mode: A/C (Placed patient back on the vent with previous settings for rest.) (03/21/22 0335)  Set Rate: 14 BPM (03/21/22 0335)  Vt Set: 480 mL (03/21/22 0335)  Pressure Support: 12 cmH20 (03/21/22 0027)  PEEP/CPAP: 5 cmH20 (03/21/22 0335)  Oxygen Concentration (%): 35 (03/21/22 0335)  Peak Airway Pressure: 12 cmH2O (03/21/22 0335)  Plateau Pressure: 20 cmH20 (03/07/22 0500)  Total Ve: 7.4 mL (03/21/22 0335)  F/VT Ratio<105 (RSBI): (!) 46.39 (03/21/22 0335)    Lines/Drains/Airways       Peripherally Inserted Central Catheter Line  Duration             PICC Double Lumen 01/05/22 left basilic 75 days              Central Venous Catheter Line  Duration                  Hemodialysis Catheter 12/30/21 0900 right internal jugular 80 days              Drain  Duration                  Colostomy 12/18/21 1030 Descending/sigmoid LUQ 92 days         Gastrostomy/Enterostomy 03/09/22 1436 Percutaneous endoscopic gastrostomy (PEG) midline feeding 11 days              Airway  Duration                  Surgical Airway 01/04/22 Shiley 76 days                    Significant Labs:    CBC/Anemia Profile:  No results for input(s): WBC, HGB, HCT, PLT, MCV, RDW, IRON, FERRITIN, RETIC, FOLATE, [id number], OCCULTBLOOD in the last 48 hours.     Chemistries:  No results for input(s): NA, K, CL, CO2, BUN, CREATININE, CALCIUM, ALBUMIN, PROT, BILITOT, ALKPHOS, ALT, AST, GLUCOSE, MG, PHOS in the last 48 hours.    All pertinent labs within the past 24 hours have been reviewed.    Significant Imaging:  I have reviewed all pertinent imaging results/findings within the past  24 hours.    Assessment/Plan:     * Denice gangrene  - s/p multiple debridements  - wound vac in place  - ID consulted for antibiotic management: He was treated with rocephin, daptomycin, clindamycin. 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 Cazares  2/21- back to OR for skin grafting  3/9- had peg tube placed   03/21/2022 no changes    On mechanically assisted ventilation  - 12/14 intubated, tracheostomy 1/4  - continuous PSV/CPAP, had to be switched back to AC due to apnea spells  3/14- on cpap this am and doing well. Will try for continuous cpap again. Only place back on assist control if apnea causes drop in sat  3/15- doing well and no issues overnight. Hopefully can try some trach collar in the next couple of days  3/16- Had an episode earlier today that required patient to be placed back on ventilator. He was suctioned and had improvement  Will go to 24 hours weaning today    Hypertension  Blood pressure better no change today      Type 2 diabetes mellitus without complication, without long-term current use of insulin  Adjust medicines today      Acute blood loss anemia  No active bleeding hemoglobin is up to 8    Necrotizing fasciitis  S/p multiple surgeries                  Jose Urban MD  Pulmonology  [facility]

## 2022-03-21 NOTE — ASSESSMENT & PLAN NOTE
- 12/14 intubated, tracheostomy 1/4  - continuous PSV/CPAP, had to be switched back to AC due to apnea spells  3/14- on cpap this am and doing well. Will try for continuous cpap again. Only place back on assist control if apnea causes drop in sat  3/15- doing well and no issues overnight. Hopefully can try some trach collar in the next couple of days  3/16- Had an episode earlier today that required patient to be placed back on ventilator. He was suctioned and had improvement  Will go to 24 hours weaning today

## 2022-03-21 NOTE — SUBJECTIVE & OBJECTIVE
Interval History: The patient is the same.  No acute changes.  No fevers or chills.    Review of patient's allergies indicates:  No Known Allergies  Current Facility-Administered Medications   Medication Frequency    0.9%  NaCl infusion PRN    acetaminophen tablet 500 mg Q6H PRN    acetic acid 0.25 % irrigation PRN    albuterol nebulizer solution 2.5 mg Q4H PRN    albuterol-ipratropium 2.5 mg-0.5 mg/3 mL nebulizer solution 3 mL Q6H    bisacodyL EC tablet 10 mg Daily PRN    calcium gluconate 1 g in dextrose 5 % in water (D5W) 5 % 100 mL IVPB (MB+) Once    collagenase ointment Daily PRN    dextrose 50% injection 12.5 g PRN    diltiaZEM tablet 90 mg Q6H    fentaNYL 50 mcg/hr 1 patch Q72H    glucagon (human recombinant) injection 1 mg PRN    guaiFENesin 100 mg/5 ml syrup 200 mg Q6H    heparin (porcine) injection 4,000 Units PRN    insulin aspart U-100 injection 1-10 Units Q6H    insulin detemir U-100 injection 20 Units QHS    methylPREDNISolone sodium succinate injection 20 mg TID    mupirocin 2 % ointment BID    ondansetron injection 8 mg Q6H PRN    pantoprazole suspension 40 mg Daily    sevelamer carbonate pwpk 0.8 g TID WM    simethicone chewable tablet 80 mg TID PRN       Objective:     Vital Signs (Most Recent):  Temp: 98.5 °F (36.9 °C) (03/21/22 0400)  Pulse: 76 (03/21/22 1200)  Resp: 11 (03/21/22 1200)  BP: 110/66 (03/21/22 1200)  SpO2: 100 % (03/21/22 1200)  O2 Device (Oxygen Therapy): ventilator (03/21/22 1110) Vital Signs (24h Range):  Temp:  [97.6 °F (36.4 °C)-98.8 °F (37.1 °C)] 98.5 °F (36.9 °C)  Pulse:  [] 76  Resp:  [10-18] 11  SpO2:  [100 %] 100 %  BP: (104-156)/(65-84) 110/66     Weight: 80.8 kg (178 lb 2.1 oz) (03/21/22 0600)  Body mass index is 24.84 kg/m².  Body surface area is 2.01 meters squared.    I/O last 3 completed shifts:  In: 1550 [Other:100; NG/GT:1450]  Out: 100 [Stool:100]    Physical Exam  Vitals reviewed.   HENT:      Head: Atraumatic.   Cardiovascular:      Rate and Rhythm:  Regular rhythm.      Heart sounds: Normal heart sounds.   Pulmonary:      Effort: Pulmonary effort is normal.      Comments: Trach collar  Abdominal:      Palpations: Abdomen is soft.   Neurological:      Mental Status: He is alert.       Significant Labs:  BMP:   Recent Labs   Lab 03/18/22  0735 03/18/22  1509   *  --    *  --    K 4.7  --    CL 91*  --    CO2 21  --    BUN 96*  --    CREATININE 4.32*  --    CALCIUM 7.9*  --    MG  --  2.4*     CBC:   Recent Labs   Lab 03/18/22  0735 03/18/22  2106   WBC 20.57*  --    RBC 2.10*  --    HGB 6.2* 8.0*   HCT 19.1* 24.3*     --    MCV 91.0  --    MCH 29.5  --    MCHC 32.5  --         Significant Imaging:  Labs: Reviewed

## 2022-03-21 NOTE — PROGRESS NOTES
[facility]  General Surgery  Progress Note    Subjective:     History of Present Illness:  65 y/o well known to Dr. Cazares from prior debridements, colostomy and other procedures.  I was asked to evaluate sacral ulcer, for possible debridement.       Post-Op Info:  * No surgery found *         Interval History: Asked to look at ulcer, consider debridement    Medications:  Continuous Infusions:  Scheduled Meds:   albuterol-ipratropium  3 mL Nebulization Q6H    calcium gluconate IVPB  1 g Intravenous Once    diltiaZEM  90 mg Per NG tube Q6H    fentaNYL  1 patch Transdermal Q72H    guaiFENesin 100 mg/5 ml  200 mg Per NG tube Q6H    insulin aspart U-100  1-10 Units Subcutaneous Q6H    insulin detemir U-100  20 Units Subcutaneous QHS    methylPREDNISolone sodium succinate injection  20 mg Intravenous TID    pantoprazole  40 mg Per NG tube Daily    sevelamer carbonate  0.8 g Per NG tube TID WM    ticagrelor  90 mg Per G Tube BID     PRN Meds:sodium chloride 0.9%, acetaminophen, acetic acid, albuterol sulfate, bisacodyL, collagenase, dextrose 50%, glucagon (human recombinant), heparin (porcine), ondansetron, simethicone     Review of patient's allergies indicates:  No Known Allergies  Objective:     Vital Signs (Most Recent):  Temp: 98.5 °F (36.9 °C) (03/21/22 0400)  Pulse: 87 (03/21/22 0400)  Resp: 17 (03/21/22 0400)  BP: (!) 141/77 (03/21/22 0625)  SpO2: 100 % (03/21/22 0400)   Vital Signs (24h Range):  Temp:  [97.6 °F (36.4 °C)-98.8 °F (37.1 °C)] 98.5 °F (36.9 °C)  Pulse:  [] 87  Resp:  [10-24] 17  SpO2:  [100 %] 100 %  BP: (127-162)/(62-86) 141/77     Weight: 79.3 kg (174 lb 13.2 oz)  Body mass index is 24.38 kg/m².    Intake/Output - Last 3 Shifts         03/19 0700  03/20 0659 03/20 0700 03/21 0659 03/21 0700 03/22 0659    Blood       Other 100 100     NG/GT  450     Total Intake(mL/kg) 100 (1.3) 550 (6.9)     Other       Total Output       Net +100 +550            Urine  Occurrence  1 x             Physical Exam  Cardiovascular:      Rate and Rhythm: Normal rate.   Abdominal:      Palpations: Abdomen is soft.   Skin:     Comments: Perianal and sacral Ulcer with necrotic tissue, pseudomonas discoloration in portions, 12cm in greatest dimension   Neurological:      General: No focal deficit present.      Mental Status: He is alert.       Significant Labs:  CBC:   Recent Labs   Lab 03/18/22  0735 03/18/22  2106   WBC 20.57*  --    RBC 2.10*  --    HGB 6.2* 8.0*   HCT 19.1* 24.3*     --    MCV 91.0  --    MCH 29.5  --    MCHC 32.5  --      BMP:   Recent Labs   Lab 03/18/22  0735 03/18/22  1509   *  --    *  --    K 4.7  --    CL 91*  --    CO2 21  --    BUN 96*  --    CREATININE 4.32*  --    CALCIUM 7.9*  --    MG  --  2.4*       Significant Diagnostics:  I have reviewed all pertinent imaging results/findings within the past 24 hours.  .    Assessment/Plan:     * Denice gangrene  Will take back to the OR tomorrow for exploratory laparotomy and debridement.    Hemoglobin 5.5; patient being transfused 2 units of packed red blood cells currently during dialysis    1/2:  Abdominal wound VAC in place, dressings in place.  Hold tube feeds at midnight.  Patient to OR for debridement and tracheostomy placement per Dr. Cazares    Pressure ulcer of sacral region, unstageable  Bedside debridement vs. OR.  Will discuss with Dr. Sage Garcia MD  General Surgery  [facility]

## 2022-03-21 NOTE — SUBJECTIVE & OBJECTIVE
Interval History:  Patient without complaints    Objective:     Vital Signs (Most Recent):  Temp: 98.5 °F (36.9 °C) (03/21/22 0400)  Pulse: 87 (03/21/22 0400)  Resp: 17 (03/21/22 0400)  BP: (!) 141/77 (03/21/22 0625)  SpO2: 100 % (03/21/22 0400)   Vital Signs (24h Range):  Temp:  [97.6 °F (36.4 °C)-98.8 °F (37.1 °C)] 98.5 °F (36.9 °C)  Pulse:  [] 87  Resp:  [10-24] 17  SpO2:  [100 %] 100 %  BP: (127-162)/(62-86) 141/77     Weight: 79.3 kg (174 lb 13.2 oz)  Body mass index is 24.38 kg/m².      Intake/Output Summary (Last 24 hours) at 3/21/2022 0720  Last data filed at 3/20/2022 1550  Gross per 24 hour   Intake 550 ml   Output --   Net 550 ml       Physical Exam  Vitals reviewed.   Constitutional:       Appearance: Normal appearance.      Interventions: He is not intubated.  HENT:      Head: Normocephalic and atraumatic.      Nose: Nose normal.      Mouth/Throat:      Mouth: Mucous membranes are dry.      Pharynx: Oropharynx is clear.   Eyes:      Extraocular Movements: Extraocular movements intact.      Conjunctiva/sclera: Conjunctivae normal.      Pupils: Pupils are equal, round, and reactive to light.   Cardiovascular:      Rate and Rhythm: Normal rate.      Heart sounds: Normal heart sounds. No murmur heard.  Pulmonary:      Effort: Pulmonary effort is normal. He is not intubated.      Breath sounds: Normal breath sounds.   Abdominal:      General: Abdomen is flat. Bowel sounds are normal.      Palpations: Abdomen is soft.   Musculoskeletal:         General: Normal range of motion.      Cervical back: Normal range of motion and neck supple.      Right lower leg: No edema.      Left lower leg: No edema.   Skin:     General: Skin is warm and dry.      Capillary Refill: Capillary refill takes less than 2 seconds.   Neurological:      General: No focal deficit present.      Mental Status: He is alert and oriented to person, place, and time.   Psychiatric:         Mood and Affect: Mood normal.         Behavior:  Behavior normal.       Vents:  Vent Mode: A/C (Placed patient back on the vent with previous settings for rest.) (03/21/22 0335)  Set Rate: 14 BPM (03/21/22 0335)  Vt Set: 480 mL (03/21/22 0335)  Pressure Support: 12 cmH20 (03/21/22 0027)  PEEP/CPAP: 5 cmH20 (03/21/22 0335)  Oxygen Concentration (%): 35 (03/21/22 0335)  Peak Airway Pressure: 12 cmH2O (03/21/22 0335)  Plateau Pressure: 20 cmH20 (03/07/22 0500)  Total Ve: 7.4 mL (03/21/22 0335)  F/VT Ratio<105 (RSBI): (!) 46.39 (03/21/22 0335)    Lines/Drains/Airways       Peripherally Inserted Central Catheter Line  Duration             PICC Double Lumen 01/05/22 left basilic 75 days              Central Venous Catheter Line  Duration                  Hemodialysis Catheter 12/30/21 0900 right internal jugular 80 days              Drain  Duration                  Colostomy 12/18/21 1030 Descending/sigmoid LUQ 92 days         Gastrostomy/Enterostomy 03/09/22 1436 Percutaneous endoscopic gastrostomy (PEG) midline feeding 11 days              Airway  Duration                  Surgical Airway 01/04/22 Shiley 76 days                    Significant Labs:    CBC/Anemia Profile:  No results for input(s): WBC, HGB, HCT, PLT, MCV, RDW, IRON, FERRITIN, RETIC, FOLATE, [id number], OCCULTBLOOD in the last 48 hours.     Chemistries:  No results for input(s): NA, K, CL, CO2, BUN, CREATININE, CALCIUM, ALBUMIN, PROT, BILITOT, ALKPHOS, ALT, AST, GLUCOSE, MG, PHOS in the last 48 hours.    All pertinent labs within the past 24 hours have been reviewed.    Significant Imaging:  I have reviewed all pertinent imaging results/findings within the past 24 hours.

## 2022-03-22 NOTE — PROGRESS NOTES
[facility]  Pulmonology  Progress Note    Patient Name: Og Garcia  MRN: [id number]  Admission Date: 12/23/2021  Hospital Length of Stay: 89 days  Code Status: Prior  Attending Provider: Cecil Abernathy DO  Primary Care Provider: Hector Arndt DNP, FNP-C   Principal Problem: Denice gangrene    Subjective:     Interval History: patient awake      Objective:     Vital Signs (Most Recent):  Temp: 97.9 °F (36.6 °C) (03/22/22 0400)  Pulse: 110 (03/22/22 0726)  Resp: (!) 46 (03/22/22 0726)  BP: 119/68 (03/22/22 0700)  SpO2: 100 % (03/22/22 0726)   Vital Signs (24h Range):  Temp:  [97.9 °F (36.6 °C)-98.3 °F (36.8 °C)] 97.9 °F (36.6 °C)  Pulse:  [] 110  Resp:  [10-46] 46  SpO2:  [99 %-100 %] 100 %  BP: ()/(61-85) 119/68     Weight: 80.3 kg (177 lb 0.5 oz)  Body mass index is 24.69 kg/m².      Intake/Output Summary (Last 24 hours) at 3/22/2022 0732  Last data filed at 3/22/2022 0600  Gross per 24 hour   Intake 2210 ml   Output 2875 ml   Net -665 ml       Physical Exam  Vitals reviewed.   Constitutional:       Appearance: Normal appearance.      Interventions: He is not intubated.  HENT:      Head: Normocephalic and atraumatic.      Nose: Nose normal.      Mouth/Throat:      Mouth: Mucous membranes are dry.      Pharynx: Oropharynx is clear.   Eyes:      Extraocular Movements: Extraocular movements intact.      Conjunctiva/sclera: Conjunctivae normal.      Pupils: Pupils are equal, round, and reactive to light.   Cardiovascular:      Rate and Rhythm: Normal rate.      Heart sounds: Normal heart sounds. No murmur heard.  Pulmonary:      Effort: Pulmonary effort is normal. He is not intubated.      Breath sounds: Normal breath sounds.   Abdominal:      General: Abdomen is flat. Bowel sounds are normal.      Palpations: Abdomen is soft.   Musculoskeletal:         General: Normal range of motion.      Cervical back: Normal range of motion and neck supple.      Right lower leg: No  edema.      Left lower leg: No edema.   Skin:     General: Skin is warm and dry.      Capillary Refill: Capillary refill takes less than 2 seconds.   Neurological:      General: No focal deficit present.      Mental Status: He is alert and oriented to person, place, and time.   Psychiatric:         Mood and Affect: Mood normal.         Behavior: Behavior normal.       Vents:  Vent Mode: A/C (03/22/22 0258)  Set Rate: 14 BPM (03/22/22 0258)  Vt Set: 480 mL (03/22/22 0258)  Pressure Support: 12 cmH20 (03/21/22 1630)  PEEP/CPAP: 5 cmH20 (03/22/22 0258)  Oxygen Concentration (%): 35 (03/22/22 0400)  Peak Airway Pressure: 9 cmH2O (03/22/22 0258)  Plateau Pressure: 20 cmH20 (03/07/22 0500)  Total Ve: 8.4 mL (03/22/22 0258)  F/VT Ratio<105 (RSBI): (!) 39.03 (03/22/22 0054)    Lines/Drains/Airways       Peripherally Inserted Central Catheter Line  Duration             PICC Double Lumen 01/05/22 left basilic 76 days              Central Venous Catheter Line  Duration                  Hemodialysis Catheter 12/30/21 0900 right internal jugular 81 days              Drain  Duration                  Colostomy 12/18/21 1030 Descending/sigmoid LUQ 93 days         Gastrostomy/Enterostomy 03/09/22 1436 Percutaneous endoscopic gastrostomy (PEG) midline feeding 12 days              Airway  Duration                  Surgical Airway 01/04/22 Shiley 77 days                    Significant Labs:    CBC/Anemia Profile:  No results for input(s): WBC, HGB, HCT, PLT, MCV, RDW, IRON, FERRITIN, RETIC, FOLATE, [id number], OCCULTBLOOD in the last 48 hours.     Chemistries:  No results for input(s): NA, K, CL, CO2, BUN, CREATININE, CALCIUM, ALBUMIN, PROT, BILITOT, ALKPHOS, ALT, AST, GLUCOSE, MG, PHOS in the last 48 hours.    All pertinent labs within the past 24 hours have been reviewed.    Significant Imaging:  I have reviewed all pertinent imaging results/findings within the past 24 hours.    Assessment/Plan:     * Denice gangrene  - s/p  multiple debridements  - wound vac in place  - ID consulted for antibiotic management: He was treated with rocephin, daptomycin, clindamycin. 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 Cazares  2/21- back to OR for skin grafting  3/9- had peg tube placed   03/21/2022 no changes    On mechanically assisted ventilation  - 12/14 intubated, tracheostomy 1/4  - continuous PSV/CPAP, had to be switched back to AC due to apnea spells  3/14- on cpap this am and doing well. Will try for continuous cpap again. Only place back on assist control if apnea causes drop in sat  3/15- doing well and no issues overnight. Hopefully can try some trach collar in the next couple of days  3/16- Had an episode earlier today that required patient to be placed back on ventilator. He was suctioned and had improvement  Will go to 24 hours weaning today    Hypertension  Blood pressure better no change today      Fever  2/26 -Tmax 101.4 -- resp culture - with GNB - will start Zosyn today - follow cultures   BC x 2 NGTD   2/28 - resp culture with pseudomonas and klebsiella  treat with zosyn for 7 days   Blood culture from 2/25 also growing Pseudomonas. Sensitive to zosyn. Will need to continue zosyn for at least 10 days total  3/9 He has completed antibiotics  03/20/2022 white count was elevated last check blood cultures pending no change in medicines    ESRD (end stage renal disease)  Started on HD 12/30   Continue with HD per nephrology      Type 2 diabetes mellitus without complication, without long-term current use of insulin  Adjust medicines today      Acute blood loss anemia  No active bleeding hemoglobin is up to 8                 Jose JAIMES  MD [place]  Pulmonology  [facility]     None known

## 2022-03-22 NOTE — SUBJECTIVE & OBJECTIVE
Interval History: patient awake      Objective:     Vital Signs (Most Recent):  Temp: 97.9 °F (36.6 °C) (03/22/22 0400)  Pulse: 110 (03/22/22 0726)  Resp: (!) 46 (03/22/22 0726)  BP: 119/68 (03/22/22 0700)  SpO2: 100 % (03/22/22 0726)   Vital Signs (24h Range):  Temp:  [97.9 °F (36.6 °C)-98.3 °F (36.8 °C)] 97.9 °F (36.6 °C)  Pulse:  [] 110  Resp:  [10-46] 46  SpO2:  [99 %-100 %] 100 %  BP: ()/(61-85) 119/68     Weight: 80.3 kg (177 lb 0.5 oz)  Body mass index is 24.69 kg/m².      Intake/Output Summary (Last 24 hours) at 3/22/2022 0732  Last data filed at 3/22/2022 0600  Gross per 24 hour   Intake 2210 ml   Output 2875 ml   Net -665 ml       Physical Exam  Vitals reviewed.   Constitutional:       Appearance: Normal appearance.      Interventions: He is not intubated.  HENT:      Head: Normocephalic and atraumatic.      Nose: Nose normal.      Mouth/Throat:      Mouth: Mucous membranes are dry.      Pharynx: Oropharynx is clear.   Eyes:      Extraocular Movements: Extraocular movements intact.      Conjunctiva/sclera: Conjunctivae normal.      Pupils: Pupils are equal, round, and reactive to light.   Cardiovascular:      Rate and Rhythm: Normal rate.      Heart sounds: Normal heart sounds. No murmur heard.  Pulmonary:      Effort: Pulmonary effort is normal. He is not intubated.      Breath sounds: Normal breath sounds.   Abdominal:      General: Abdomen is flat. Bowel sounds are normal.      Palpations: Abdomen is soft.   Musculoskeletal:         General: Normal range of motion.      Cervical back: Normal range of motion and neck supple.      Right lower leg: No edema.      Left lower leg: No edema.   Skin:     General: Skin is warm and dry.      Capillary Refill: Capillary refill takes less than 2 seconds.   Neurological:      General: No focal deficit present.      Mental Status: He is alert and oriented to person, place, and time.   Psychiatric:         Mood and Affect: Mood normal.         Behavior:  Behavior normal.       Vents:  Vent Mode: A/C (03/22/22 0258)  Set Rate: 14 BPM (03/22/22 0258)  Vt Set: 480 mL (03/22/22 0258)  Pressure Support: 12 cmH20 (03/21/22 1630)  PEEP/CPAP: 5 cmH20 (03/22/22 0258)  Oxygen Concentration (%): 35 (03/22/22 0400)  Peak Airway Pressure: 9 cmH2O (03/22/22 0258)  Plateau Pressure: 20 cmH20 (03/07/22 0500)  Total Ve: 8.4 mL (03/22/22 0258)  F/VT Ratio<105 (RSBI): (!) 39.03 (03/22/22 0054)    Lines/Drains/Airways       Peripherally Inserted Central Catheter Line  Duration             PICC Double Lumen 01/05/22 left basilic 76 days              Central Venous Catheter Line  Duration                  Hemodialysis Catheter 12/30/21 0900 right internal jugular 81 days              Drain  Duration                  Colostomy 12/18/21 1030 Descending/sigmoid LUQ 93 days         Gastrostomy/Enterostomy 03/09/22 1436 Percutaneous endoscopic gastrostomy (PEG) midline feeding 12 days              Airway  Duration                  Surgical Airway 01/04/22 Shiley 77 days                    Significant Labs:    CBC/Anemia Profile:  No results for input(s): WBC, HGB, HCT, PLT, MCV, RDW, IRON, FERRITIN, RETIC, FOLATE, [id number], OCCULTBLOOD in the last 48 hours.     Chemistries:  No results for input(s): NA, K, CL, CO2, BUN, CREATININE, CALCIUM, ALBUMIN, PROT, BILITOT, ALKPHOS, ALT, AST, GLUCOSE, MG, PHOS in the last 48 hours.    All pertinent labs within the past 24 hours have been reviewed.    Significant Imaging:  I have reviewed all pertinent imaging results/findings within the past 24 hours.

## 2022-03-22 NOTE — SUBJECTIVE & OBJECTIVE
Interval History: The patient's condition is about the same.  No acute changes.    Review of patient's allergies indicates:  No Known Allergies  Current Facility-Administered Medications   Medication Frequency    0.9%  NaCl infusion PRN    acetaminophen tablet 500 mg Q6H PRN    acetic acid 0.25 % irrigation PRN    albuterol nebulizer solution 2.5 mg Q4H PRN    albuterol-ipratropium 2.5 mg-0.5 mg/3 mL nebulizer solution 3 mL Q6H    bisacodyL EC tablet 10 mg Daily PRN    calcium gluconate 1 g in dextrose 5 % in water (D5W) 5 % 100 mL IVPB (MB+) Once    collagenase ointment Daily PRN    dextrose 50% injection 12.5 g PRN    diltiaZEM tablet 90 mg Q6H    fentaNYL 50 mcg/hr 1 patch Q72H    glucagon (human recombinant) injection 1 mg PRN    guaiFENesin 100 mg/5 ml syrup 200 mg Q6H    heparin (porcine) injection 4,000 Units PRN    insulin aspart U-100 injection 1-10 Units Q6H    insulin detemir U-100 injection 25 Units QHS    methylPREDNISolone sodium succinate injection 20 mg TID    mupirocin 2 % ointment BID    ondansetron injection 8 mg Q6H PRN    pantoprazole suspension 40 mg Daily    sevelamer carbonate pwpk 0.8 g TID WM    simethicone chewable tablet 80 mg TID PRN       Objective:     Vital Signs (Most Recent):  Temp: 98.5 °F (36.9 °C) (03/22/22 1134)  Pulse: 97 (03/22/22 1045)  Resp: 18 (03/22/22 1134)  BP: 130/64 (03/22/22 1045)  SpO2: 100 % (03/22/22 1045)  O2 Device (Oxygen Therapy): ventilator (03/22/22 0726) Vital Signs (24h Range):  Temp:  [97.9 °F (36.6 °C)-98.5 °F (36.9 °C)] 98.5 °F (36.9 °C)  Pulse:  [] 97  Resp:  [10-46] 18  SpO2:  [82 %-100 %] 100 %  BP: ()/(62-85) 130/64     Weight: 80.3 kg (177 lb 0.5 oz) (03/22/22 0100)  Body mass index is 24.69 kg/m².  Body surface area is 2.01 meters squared.    I/O last 3 completed shifts:  In: 3210 [NG/GT:3210]  Out: 2975 [Other:2500; Stool:475]    Physical Exam  Vitals reviewed.   HENT:      Head: Atraumatic.      Mouth/Throat:      Mouth: Mucous  membranes are dry.   Cardiovascular:      Rate and Rhythm: Regular rhythm.      Heart sounds: Normal heart sounds.   Pulmonary:      Effort: Pulmonary effort is normal.      Comments: Trach collar  Abdominal:      General: Abdomen is flat.   Neurological:      Mental Status: He is alert.       Significant Labs:  BMP:   Recent Labs   Lab 03/18/22  0735 03/18/22  1509   *  --    *  --    K 4.7  --    CL 91*  --    CO2 21  --    BUN 96*  --    CREATININE 4.32*  --    CALCIUM 7.9*  --    MG  --  2.4*     CBC:   Recent Labs   Lab 03/18/22  0735 03/18/22  2106   WBC 20.57*  --    RBC 2.10*  --    HGB 6.2* 8.0*   HCT 19.1* 24.3*     --    MCV 91.0  --    MCH 29.5  --    MCHC 32.5  --         Significant Imaging:  Labs: Reviewed

## 2022-03-22 NOTE — ASSESSMENT & PLAN NOTE
Continue wound care  Wound care.  Continue with wound care  3/19/2022 Wound care management  3/22/2022 Continue with wound management

## 2022-03-22 NOTE — PROGRESS NOTES
[facility]  Nephrology  Progress Note    Patient Name: Og Garcia  MRN: [id number]  Admission Date: 12/23/2021  Hospital Length of Stay: 89 days  Attending Provider: Cecil Abernathy DO   Primary Care Physician: Hector Arndt DNP, FNP-C  Principal Problem:Denice gangrene    Subjective:     HPI: The patient is known from the previous nephrology consult at [facility].  He is no at Specialty and continues to need dialysis support.      Interval History: The patient's condition is about the same.  No acute changes.    Review of patient's allergies indicates:  No Known Allergies  Current Facility-Administered Medications   Medication Frequency    0.9%  NaCl infusion PRN    acetaminophen tablet 500 mg Q6H PRN    acetic acid 0.25 % irrigation PRN    albuterol nebulizer solution 2.5 mg Q4H PRN    albuterol-ipratropium 2.5 mg-0.5 mg/3 mL nebulizer solution 3 mL Q6H    bisacodyL EC tablet 10 mg Daily PRN    calcium gluconate 1 g in dextrose 5 % in water (D5W) 5 % 100 mL IVPB (MB+) Once    collagenase ointment Daily PRN    dextrose 50% injection 12.5 g PRN    diltiaZEM tablet 90 mg Q6H    fentaNYL 50 mcg/hr 1 patch Q72H    glucagon (human recombinant) injection 1 mg PRN    guaiFENesin 100 mg/5 ml syrup 200 mg Q6H    heparin (porcine) injection 4,000 Units PRN    insulin aspart U-100 injection 1-10 Units Q6H    insulin detemir U-100 injection 25 Units QHS    methylPREDNISolone sodium succinate injection 20 mg TID    mupirocin 2 % ointment BID    ondansetron injection 8 mg Q6H PRN    pantoprazole suspension 40 mg Daily    sevelamer carbonate pwpk 0.8 g TID WM    simethicone chewable tablet 80 mg TID PRN       Objective:     Vital Signs (Most Recent):  Temp: 98.5 °F (36.9 °C) (03/22/22 1134)  Pulse: 97 (03/22/22 1045)  Resp: 18 (03/22/22 1134)  BP: 130/64 (03/22/22 1045)  SpO2: 100 % (03/22/22 1045)  O2 Device (Oxygen Therapy): ventilator (03/22/22 0726) Vital Signs (24h  Range):  Temp:  [97.9 °F (36.6 °C)-98.5 °F (36.9 °C)] 98.5 °F (36.9 °C)  Pulse:  [] 97  Resp:  [10-46] 18  SpO2:  [82 %-100 %] 100 %  BP: ()/(62-85) 130/64     Weight: 80.3 kg (177 lb 0.5 oz) (03/22/22 0100)  Body mass index is 24.69 kg/m².  Body surface area is 2.01 meters squared.    I/O last 3 completed shifts:  In: 3210 [NG/GT:3210]  Out: 2975 [Other:2500; Stool:475]    Physical Exam  Vitals reviewed.   HENT:      Head: Atraumatic.      Mouth/Throat:      Mouth: Mucous membranes are dry.   Cardiovascular:      Rate and Rhythm: Regular rhythm.      Heart sounds: Normal heart sounds.   Pulmonary:      Effort: Pulmonary effort is normal.      Comments: Trach collar  Abdominal:      General: Abdomen is flat.   Neurological:      Mental Status: He is alert.       Significant Labs:  BMP:   Recent Labs   Lab 03/18/22  0735 03/18/22  1509   *  --    *  --    K 4.7  --    CL 91*  --    CO2 21  --    BUN 96*  --    CREATININE 4.32*  --    CALCIUM 7.9*  --    MG  --  2.4*     CBC:   Recent Labs   Lab 03/18/22  0735 03/18/22  2106   WBC 20.57*  --    RBC 2.10*  --    HGB 6.2* 8.0*   HCT 19.1* 24.3*     --    MCV 91.0  --    MCH 29.5  --    MCHC 32.5  --         Significant Imaging:  Labs: Reviewed    Assessment/Plan:     * Denice gangrene  Continue wound care  Wound care.  Continue with wound care  3/19/2022 Wound care management  3/22/2022 Continue with wound management    ESRD (end stage renal disease)  3/22/2022 Continue with dialysis as scheduled.    Respiratory failure.  Continue with weaning trials.    Thank you for your consult. I will follow-up with patient. Please contact us if you have any additional questions.    Edwin Pryor Jr, MD  Nephrology  [facility]

## 2022-03-22 NOTE — PROGRESS NOTES
[facility]  Adult Nutrition  Follow-up Note         Reason for Assessment  Reason For Assessment: RD follow-up  Nutrition Risk Screen: tube feeding or parenteral nutrition  Malnutrition  Is Patient Malnourished: No  Nutrition Diagnosis  Increased protein needs   related to Decreased/ impaired skin integrity as evidenced by wounds    Nutrition Diagnosis Status: Improving      Nutrition Risk  Level of Risk/Frequency of Follow-up: high   Chewing or Swallowing Difficulty?: Swallowing difficulty  Estimated/Assessed Needs  RMR ([place]-St. Jeor Equation): 1605.13 Activity Factor: 1 Injury Factor: 1.2   Total Ve: 8.2 mL Temp: 98.5 °F (36.9 °C)Oral  Weight Used For Calorie Calculations: 79.2 kg (174 lb 9.7 oz)   Energy Need Method: [facility] (modified) Energy Calorie Requirements (kcal): 2105  Weight Used For Protein Calculations: 83.3 kg (183 lb 10.3 oz)  Protein Requirements: 100-125  Estimated Fluid Requirement Method: RDA Method Fluid Requirements (mL): 1966  RDA Method (mL): 2105     Nutrition Prescription / Recommendations  Recommendation/Intervention: continue PEG tube feeding: Nepro @ 55ml/hr; H20 flush of 150ml q 4hr  Goals: pt will tolerate tube feeding; wound healing; will meet estimated nutritional needs  Nutrition Goal Status: progressing towards goal  Communication of RD Recs: reviewed with physician  Current Diet Order: NPO/ PEG tube feeding  Nutrition Order Comments: PEG tube feeding: Nepro @ 55ml/hr; H20 flush of 150ml q 4hr  Current Nutrition Support Formula Ordered: Nepro  Current Nutrition Support Rate Ordered: 55 (ml)  Current Nutrition Support Frequency Ordered: hourly  Recommended Diet: Enteral Nutrition PEG tube feeding: Nepro @ 55ml/hr; H20 flush of 150ml q 4hr  Recommended Oral Supplement: No Oral Supplements    Is Education Recommended: No  Monitor and Evaluation  % current Intake: Enteral Nutrition at goal  % intake to meet estimated needs: Enteral Nutrition   Food and  Nutrient Intake: enteral nutrition intake  Food and Nutrient Adminstration: enteral and parenteral nutrition administration  Anthropometric Measurements: height/length, body mass index, weight, weight change  Biochemical Data, Medical Tests and Procedures: electrolyte and renal panel, glucose/endocrine profile  Nutrition-Focused Physical Findings: skin  Enteral Calories (kcal): 2376  Enteral Protein (gm): 106  Enteral (Free Water) Fluid (mL): 950  Free Water Flush Fluid (mL): 900  Parenteral Calories (kcal): 845  Parenteral Protein (gm): 48  Parenteral Fluid (mL): 960  Lipid Calories (kcals): 500 kcals  Other Calories (kcal): 528 (propofol)  Total Calories (kcal): 2160  Total Calories (kcal/kg): 2612  % Kcal Needs: 100  Total Protein (gm): 135  % Protein Needs: 100  IV Fluid (mL): 1764  Total Fluid Intake (mL): 1850  Energy Calories Required: meeting needs  Protein Required: meeting needs  Fluid Required: meeting needs  Tolerance: tolerating  Current Medical Diagnosis and Past Medical History  Diagnosis: gastrointestinal disease, infection/sepsis  Past Medical History:   Diagnosis Date    Hyperlipidemia     Hypertension      Nutrition/Diet History  Spiritual, Cultural Beliefs, Worship Practices, Values that Affect Care: no  Food Allergies: NKFA  Factors Affecting Nutritional Intake: None identified at this time  Lab/Procedures/Meds  No results for input(s): NA, K, BUN, CREATININE, GLU, CALCIUM, ALBUMIN, CL, ALT, AST, PHOS in the last 72 hours.  Last A1c: No results found for: HGBA1C  Lab Results   Component Value Date    RBC 2.10 (L) 03/18/2022    HGB 8.0 (L) 03/18/2022    HCT 24.3 (L) 03/18/2022    MCV 91.0 03/18/2022    MCH 29.5 03/18/2022    MCHC 32.5 03/18/2022     Pertinent Labs Reviewed: reviewed  Pertinent Labs Comments: Hct 24.3, , BUN 96, Creat 4.32, Glu 300, Alb 1.3  Pertinent Medications Reviewed: reviewed  Pertinent Medications Comments: heparin, insulin  Anthropometrics  Temp: 98.5 °F (36.9  "°C)  Height Method: Stated  Height: 5' 11" (180.3 cm)  Height (inches): 71 in  Weight Method: Bed Scale  Weight: 80.3 kg (177 lb 0.5 oz)  Weight (lb): 177.03 lb  Ideal Body Weight (IBW), Male: 172 lb  % Ideal Body Weight, Male (lb): 106.77 %  BMI (Calculated): 24.7  BMI Grade: 25 - 29.9 - overweight     Nutrition by Nursing  Diet/Nutrition Received: tube feeding     Diet/Feeding Assistance: none  Diet/Feeding Tolerance: good  Last Bowel Movement: 03/22/22  [REMOVED]      NG/OG Tube [place] sump 18 Fr. Left nostril-Feeding Type: continuous, by pump       Gastrostomy/Enterostomy 03/09/22 1436 Percutaneous endoscopic gastrostomy (PEG) midline feeding-Feeding Type: continuous, by pump  [REMOVED]      NG/OG Tube [place] sump 18 Fr. Left nostril-Current Rate (mL/hr): 50 mL/hr       Gastrostomy/Enterostomy 03/09/22 1436 Percutaneous endoscopic gastrostomy (PEG) midline feeding-Current Rate (mL/hr): 55 mL/hr  [REMOVED]      NG/OG Tube [place] sump 18 Fr. Left nostril-Goal Rate (mL/hr): 75 mL/hr       Gastrostomy/Enterostomy 03/09/22 1436 Percutaneous endoscopic gastrostomy (PEG) midline feeding-Goal Rate (mL/hr): 55 mL/hr  [REMOVED]      NG/OG Tube [place] sump 18 Fr. Left nostril-Formula Name: nepro 1.8       Gastrostomy/Enterostomy 03/09/22 1436 Percutaneous endoscopic gastrostomy (PEG) midline feeding-Formula Name: nepro (new bottle and tubing hung)  Nutrition Follow-Up  RD Follow-up?: Yes  Assessment and Plan  No new Assessment & Plan notes have been filed under this hospital service since the last note was generated.  Service: Nutrition     "

## 2022-03-22 NOTE — PLAN OF CARE
Problem: Adult Inpatient Plan of Care  Goal: Plan of Care Review  Outcome: Ongoing, Progressing     Problem: Communication Impairment (Mechanical Ventilation, Invasive)  Goal: Effective Communication  Outcome: Ongoing, Progressing

## 2022-03-23 NOTE — NURSING
Pt in bed resting no acute distress noted. Safety measures intact, no resp distress noted, remains on Cpap as [place]. cb near. Will continue to monitor.

## 2022-03-23 NOTE — PLAN OF CARE
Problem: Adult Inpatient Plan of Care  Goal: Plan of Care Review  Outcome: Ongoing, Progressing  Goal: Patient-Specific Goal (Individualized)  Outcome: Ongoing, Progressing  Goal: Absence of Hospital-Acquired Illness or Injury  Outcome: Ongoing, Progressing  Goal: Optimal Comfort and Wellbeing  Outcome: Ongoing, Progressing     Problem: Communication Impairment (Mechanical Ventilation, Invasive)  Goal: Effective Communication  Outcome: Ongoing, Progressing     Problem: Device-Related Complication Risk (Mechanical Ventilation, Invasive)  Goal: Optimal Device Function  Outcome: Ongoing, Progressing     Problem: Inability to Wean (Mechanical Ventilation, Invasive)  Goal: Mechanical Ventilation Liberation  Outcome: Ongoing, Progressing     Problem: Nutrition Impairment (Mechanical Ventilation, Invasive)  Goal: Optimal Nutrition Delivery  Outcome: Ongoing, Progressing     Problem: Skin and Tissue Injury (Mechanical Ventilation, Invasive)  Goal: Absence of Device-Related Skin and Tissue Injury  Outcome: Ongoing, Progressing     Problem: Ventilator-Induced Lung Injury (Mechanical Ventilation, Invasive)  Goal: Absence of Ventilator-Induced Lung Injury  Outcome: Ongoing, Progressing     Problem: Communication Impairment (Artificial Airway)  Goal: Effective Communication  Outcome: Ongoing, Progressing     Problem: Device-Related Complication Risk (Artificial Airway)  Goal: Optimal Device Function  Outcome: Ongoing, Progressing     Problem: Skin and Tissue Injury (Artificial Airway)  Goal: Absence of Device-Related Skin or Tissue Injury  Outcome: Ongoing, Progressing

## 2022-03-23 NOTE — NURSING
Dr. Urban made rounds report on pt status given, no new orders received will continue to monitor.

## 2022-03-23 NOTE — NURSING
Received pt in bed no acute distress noted. #[id number] trach intact, vent noted on cpap 12/5/35%, no acute distress noted. abd dressing intact, colostomy intact no leaking noted. Stoma pink, dressing to sacral and austyn feet intact, left foot dresss noted with bleeding, area marked. Safety measures intact cb near, will continue to monitor.

## 2022-03-23 NOTE — ASSESSMENT & PLAN NOTE
- 12/14 intubated, tracheostomy 1/4  - continuous PSV/CPAP, had to be switched back to AC due to apnea spells  3/14- on cpap this am and doing well. Will try for continuous cpap again. Only place back on assist control if apnea causes drop in sat  3/15- doing well and no issues overnight. Hopefully can try some trach collar in the next couple of days  3/16- Had an episode earlier today that required patient to be placed back on ventilator. He was suctioned and had improvement  We can start T-tube trials patient continues to improve

## 2022-03-23 NOTE — DIALYSIS ROUNDING
"          Nephrology Department            NAME: Og Garcia   YOB: 1955  MRN: [id number]  NOTE DATE: 03/23/2022       Seen on dialysis.  He is tolerating the procedure.    Patient complains:  None.      REVIEW OF SYSTEMS:   No acute changes.    VITALS:  height is 5' 11" (1.803 m) and weight is 78.3 kg (172 lb 9.9 oz). His axillary temperature is 97.7 °F (36.5 °C). His blood pressure is 107/62 and his pulse is 79. His respiration is 8 (abnormal) and oxygen saturation is 100%.  Hemodialysis documentation flowsheet reviewed with dialysis nurse, including weight and vitals.    NAD.   Lungs clear.  Heart regular, no rub.  Abdomen soft, nontender, positive bowl sounds  No edema.    Recent Labs   Lab 03/17/22  1052 03/18/22  0735   * 127*   K 4.7 4.7   CL 93* 91*   CO2 24 21   BUN 60* 96*   CREATININE 3.56* 4.32*   CALCIUM 8.5 7.9*     Recent Labs   Lab 03/17/22  1052 03/18/22  0735 03/18/22  2106   WBC 17.75* 20.57*  --    HGB 5.3* 6.2* 8.0*   HCT 17.0* 19.1* 24.3*    238  --        ASSESSMENT/PLAN:  Continue with scheduled hemodialysis for this patient.    Edwin Pryor Jr, MD  "

## 2022-03-23 NOTE — PROGRESS NOTES
Wound care note;  Patient skin was evaluated today  Patient in bed, alert, on ventilator  Sacral wound with black/tan necrotic tissue noted, distal aspect with red slow  granular tissue. Both wounds are now combined as wound.  Right lateral ankle, lower leg and Left posterior heel with black/tan unstageable coverage to wound bed.  Abdominal wound skin graft 96 % healed taken. Area at 9 and 11 clock with superficial red granular tissue. Applied dry dressing.  Right thigh donor site healed   Cont turn protocol  Pillows to offload heels  On low air loss mattress  Wound care team to follow

## 2022-03-23 NOTE — NURSING
Patient in bed resting no acute distress noted. Safety measures intact cb near. Will continue to monitor.

## 2022-03-23 NOTE — PROGRESS NOTES
[facility]  Pulmonology  Progress Note    Patient Name: Og Garcia  MRN: [id number]  Admission Date: 12/23/2021  Hospital Length of Stay: 90 days  Code Status: Prior  Attending Provider: Cecil Abernathy DO  Primary Care Provider: Hector Arndt DNP, FNP-C   Principal Problem: Denice gangrene    Subjective:     Interval History:  Patient without complaints    Objective:     Vital Signs (Most Recent):  Temp: 97.6 °F (36.4 °C) (03/23/22 0400)  Pulse: 75 (03/23/22 0500)  Resp: 14 (03/23/22 0500)  BP: (!) 150/75 (03/23/22 0500)  SpO2: 100 % (03/23/22 0500)   Vital Signs (24h Range):  Temp:  [97.3 °F (36.3 °C)-98.5 °F (36.9 °C)] 97.6 °F (36.4 °C)  Pulse:  [] 75  Resp:  [8-46] 14  SpO2:  [82 %-100 %] 100 %  BP: ()/(47-78) 150/75     Weight: 78.3 kg (172 lb 9.9 oz)  Body mass index is 24.08 kg/m².      Intake/Output Summary (Last 24 hours) at 3/23/2022 0559  Last data filed at 3/23/2022 0400  Gross per 24 hour   Intake 1910 ml   Output 50 ml   Net 1860 ml       Physical Exam  Vitals reviewed.   Constitutional:       Appearance: Normal appearance.      Interventions: He is not intubated.  HENT:      Head: Normocephalic and atraumatic.      Nose: Nose normal.      Mouth/Throat:      Mouth: Mucous membranes are dry.      Pharynx: Oropharynx is clear.   Eyes:      Extraocular Movements: Extraocular movements intact.      Conjunctiva/sclera: Conjunctivae normal.      Pupils: Pupils are equal, round, and reactive to light.   Cardiovascular:      Rate and Rhythm: Normal rate.      Heart sounds: Normal heart sounds. No murmur heard.  Pulmonary:      Effort: Pulmonary effort is normal. He is not intubated.      Breath sounds: Normal breath sounds.   Abdominal:      General: Abdomen is flat. Bowel sounds are normal.      Palpations: Abdomen is soft.   Musculoskeletal:         General: Normal range of motion.      Cervical back: Normal range of motion and neck supple.      Right lower  leg: No edema.      Left lower leg: No edema.   Skin:     General: Skin is warm and dry.      Capillary Refill: Capillary refill takes less than 2 seconds.   Neurological:      General: No focal deficit present.      Mental Status: He is alert and oriented to person, place, and time.   Psychiatric:         Mood and Affect: Mood normal.         Behavior: Behavior normal.       Vents:  Vent Mode: PS/CPAP (03/23/22 0255)  Set Rate: 14 BPM (03/22/22 0258)  Vt Set: 480 mL (03/22/22 0258)  Pressure Support: 12 cmH20 (03/23/22 0255)  PEEP/CPAP: 5 cmH20 (03/23/22 0255)  Oxygen Concentration (%): 60 (03/23/22 0255)  Peak Airway Pressure: 18 cmH2O (03/23/22 0255)  Plateau Pressure: 20 cmH20 (03/07/22 0500)  Total Ve: 7 mL (03/23/22 0255)  F/VT Ratio<105 (RSBI): (!) 16.29 (03/23/22 0102)    Lines/Drains/Airways       Peripherally Inserted Central Catheter Line  Duration             PICC Double Lumen 01/05/22 left basilic 77 days              Central Venous Catheter Line  Duration                  Hemodialysis Catheter 12/30/21 0900 right internal jugular 82 days              Drain  Duration                  Colostomy 12/18/21 1030 Descending/sigmoid LUQ 94 days         Gastrostomy/Enterostomy 03/09/22 1436 Percutaneous endoscopic gastrostomy (PEG) midline feeding 13 days              Airway  Duration                  Surgical Airway 01/04/22 Shiley 78 days                    Significant Labs:    CBC/Anemia Profile:  No results for input(s): WBC, HGB, HCT, PLT, MCV, RDW, IRON, FERRITIN, RETIC, FOLATE, [id number], OCCULTBLOOD in the last 48 hours.     Chemistries:  No results for input(s): NA, K, CL, CO2, BUN, CREATININE, CALCIUM, ALBUMIN, PROT, BILITOT, ALKPHOS, ALT, AST, GLUCOSE, MG, PHOS in the last 48 hours.    All pertinent labs within the past 24 hours have been reviewed.    Significant Imaging:  I have reviewed all pertinent imaging results/findings within the past 24 hours.    Assessment/Plan:     * Denice gangrene  -  s/p multiple debridements  - wound vac in place  - ID consulted for antibiotic management: He was treated with rocephin, daptomycin, clindamycin. 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 Cazares  2/21- back to OR for skin grafting  3/9- had peg tube placed   03/23/2022 no changes    On mechanically assisted ventilation  - 12/14 intubated, tracheostomy 1/4  - continuous PSV/CPAP, had to be switched back to AC due to apnea spells  3/14- on cpap this am and doing well. Will try for continuous cpap again. Only place back on assist control if apnea causes drop in sat  3/15- doing well and no issues overnight. Hopefully can try some trach collar in the next couple of days  3/16- Had an episode earlier today that required patient to be placed back on ventilator. He was suctioned and had improvement  We can start T-tube trials patient continues to improve    Hypertension  Blood pressure still an issue review medicines      ESRD (end stage renal disease)  Started on HD 12/30   Continue with HD per nephrology      Type 2 diabetes mellitus without complication, without long-term current use of insulin  Review meds still elevated      Acute blood loss anemia  No active bleeding hemoglobin is up to 8                 Jose Urban MD  Pulmonology  [facility]

## 2022-03-23 NOTE — SUBJECTIVE & OBJECTIVE
Interval History:  Patient without complaints    Objective:     Vital Signs (Most Recent):  Temp: 97.6 °F (36.4 °C) (03/23/22 0400)  Pulse: 75 (03/23/22 0500)  Resp: 14 (03/23/22 0500)  BP: (!) 150/75 (03/23/22 0500)  SpO2: 100 % (03/23/22 0500)   Vital Signs (24h Range):  Temp:  [97.3 °F (36.3 °C)-98.5 °F (36.9 °C)] 97.6 °F (36.4 °C)  Pulse:  [] 75  Resp:  [8-46] 14  SpO2:  [82 %-100 %] 100 %  BP: ()/(47-78) 150/75     Weight: 78.3 kg (172 lb 9.9 oz)  Body mass index is 24.08 kg/m².      Intake/Output Summary (Last 24 hours) at 3/23/2022 0559  Last data filed at 3/23/2022 0400  Gross per 24 hour   Intake 1910 ml   Output 50 ml   Net 1860 ml       Physical Exam  Vitals reviewed.   Constitutional:       Appearance: Normal appearance.      Interventions: He is not intubated.  HENT:      Head: Normocephalic and atraumatic.      Nose: Nose normal.      Mouth/Throat:      Mouth: Mucous membranes are dry.      Pharynx: Oropharynx is clear.   Eyes:      Extraocular Movements: Extraocular movements intact.      Conjunctiva/sclera: Conjunctivae normal.      Pupils: Pupils are equal, round, and reactive to light.   Cardiovascular:      Rate and Rhythm: Normal rate.      Heart sounds: Normal heart sounds. No murmur heard.  Pulmonary:      Effort: Pulmonary effort is normal. He is not intubated.      Breath sounds: Normal breath sounds.   Abdominal:      General: Abdomen is flat. Bowel sounds are normal.      Palpations: Abdomen is soft.   Musculoskeletal:         General: Normal range of motion.      Cervical back: Normal range of motion and neck supple.      Right lower leg: No edema.      Left lower leg: No edema.   Skin:     General: Skin is warm and dry.      Capillary Refill: Capillary refill takes less than 2 seconds.   Neurological:      General: No focal deficit present.      Mental Status: He is alert and oriented to person, place, and time.   Psychiatric:         Mood and Affect: Mood normal.          Behavior: Behavior normal.       Vents:  Vent Mode: PS/CPAP (03/23/22 0255)  Set Rate: 14 BPM (03/22/22 0258)  Vt Set: 480 mL (03/22/22 0258)  Pressure Support: 12 cmH20 (03/23/22 0255)  PEEP/CPAP: 5 cmH20 (03/23/22 0255)  Oxygen Concentration (%): 60 (03/23/22 0255)  Peak Airway Pressure: 18 cmH2O (03/23/22 0255)  Plateau Pressure: 20 cmH20 (03/07/22 0500)  Total Ve: 7 mL (03/23/22 0255)  F/VT Ratio<105 (RSBI): (!) 16.29 (03/23/22 0102)    Lines/Drains/Airways       Peripherally Inserted Central Catheter Line  Duration             PICC Double Lumen 01/05/22 left basilic 77 days              Central Venous Catheter Line  Duration                  Hemodialysis Catheter 12/30/21 0900 right internal jugular 82 days              Drain  Duration                  Colostomy 12/18/21 1030 Descending/sigmoid LUQ 94 days         Gastrostomy/Enterostomy 03/09/22 1436 Percutaneous endoscopic gastrostomy (PEG) midline feeding 13 days              Airway  Duration                  Surgical Airway 01/04/22 Shiley 78 days                    Significant Labs:    CBC/Anemia Profile:  No results for input(s): WBC, HGB, HCT, PLT, MCV, RDW, IRON, FERRITIN, RETIC, FOLATE, [id number], OCCULTBLOOD in the last 48 hours.     Chemistries:  No results for input(s): NA, K, CL, CO2, BUN, CREATININE, CALCIUM, ALBUMIN, PROT, BILITOT, ALKPHOS, ALT, AST, GLUCOSE, MG, PHOS in the last 48 hours.    All pertinent labs within the past 24 hours have been reviewed.    Significant Imaging:  I have reviewed all pertinent imaging results/findings within the past 24 hours.

## 2022-03-23 NOTE — ASSESSMENT & PLAN NOTE
- s/p multiple debridements  - wound vac in place  - ID consulted for antibiotic management: He was treated with rocephin, daptomycin, clindamycin. 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 Cazares  2/21- back to OR for skin grafting  3/9- had peg tube placed   03/23/2022 no changes

## 2022-03-23 NOTE — PROGRESS NOTES
[facility]   Wound Care  Progress Note    Patient Name: Og Garcia  MRN: [id number]  Admission Date: 12/23/2021  Attending Physician: Cecil Abernathy DO    Chief Complaint:  Wounds to abdomen, sacral, left lateral lower leg, right anterior foot, right hand, left posterior heel, and right lateral and medial heel    Past Medical History:   Diagnosis Date    Hyperlipidemia     Hypertension         Subjective:     HPI:  Og Garcia is a 66 y.o. male with wounds to abdomen, sacral, left lateral lower leg, right anterior foot, right hand, Left posterior heel, and right lateral and medial foot. Skin graft on abdomen intact and healing well. Sacral wound continues to worsen, wound is necrotic with moderate amount of green drainage. Wounds on feet and heels also have necrotic tissue. Currently treating wounds with silvadene.     Review of Systems   Unable to perform ROS: Acuity of condition     Objective:     Vital Signs (Most Recent):  Temp: 97.9 °F (36.6 °C) (03/22/22 0400)  Pulse: 110 (03/22/22 0726)  Resp: (!) 46 (03/22/22 0726)  BP: 119/68 (03/22/22 0700)  SpO2: 100 % (03/22/22 0726)    Vital Signs (24h Range):  Temp:  [97.9 °F (36.6 °C)-98.3 °F (36.8 °C)] 97.9 °F (36.6 °C)  Pulse:  [] 110  Resp:  [10-46] 46  SpO2:  [99 %-100 %] 100 %  BP: ()/(61-85) 119/68      Weight: 80.3 kg (177 lb 0.5 oz)  Body mass index is 24.69 kg/m².    Physical Exam  Vitals reviewed.   Constitutional:       Appearance: He is ill-appearing.   HENT:      Head: Normocephalic.   Cardiovascular:      Rate and Rhythm: Normal rate and regular rhythm.      Pulses: Normal pulses.      Heart sounds: Normal heart sounds.   Pulmonary:      Comments: Intubated  Skin:     Findings: Erythema present.      Comments: Wound, see wound assessment and pictures   Neurological:      Mental Status: He is alert. Mental status is at baseline.      Motor: Weakness present.          Assessment and Plan       Assessment:  1. Abdominal  wound/Skin graft  2. Right thigh donor site  3. Sacral wound  4.  Lower extremity wounds     Plan:   1. Abdomen-continue mepitel  2. Apply sample of polymem silver dressing to all other wounds. Leave in place until Friday. Patient is going tentatively going to surgery on Friday for debridement.   3. Turn every 2 hours, change dressing on Friday and PRN for soilage, keep pressure off sacral and heels.         Signature:  HERSON Viveros  Wound Care    Date of encounter: 3-

## 2022-03-24 PROBLEM — B46.4: Status: RESOLVED | Noted: 2022-01-01 | Resolved: 2022-01-01

## 2022-03-24 NOTE — H&P (VIEW-ONLY)
Subjective:       Patient ID: Og Garcia is a 66 y.o. male.    Chief Complaint: necrotizing fasciitis    Patient seen earlier in the week for a sacral decubitus ulcer.  Has been on Plavix but has been able to stop it in the past for surgery.  Otherwise doing well from a overall standpoint trach and PEG tube working well abdominal skin graft looking good.    Review of Systems   Constitutional: Positive for activity change. Negative for fatigue and fever.   HENT: Negative for trouble swallowing.    Respiratory: Negative for cough and shortness of breath.    Cardiovascular: Negative for chest pain.   Gastrointestinal: Negative for abdominal distention and abdominal pain.   Endocrine: Negative for cold intolerance and heat intolerance.   All other systems reviewed and are negative.        Objective:      Physical Exam  Constitutional:       General: He is not in acute distress.  HENT:      Head: Normocephalic.   Cardiovascular:      Rate and Rhythm: Normal rate and regular rhythm.      Pulses: Normal pulses.   Pulmonary:      Effort: Pulmonary effort is normal. No respiratory distress.      Breath sounds: Normal breath sounds.      Comments: Trach in place no issues  Abdominal:      General: Abdomen is flat. There is no distension.      Palpations: Abdomen is soft.      Tenderness: There is no abdominal tenderness.   Musculoskeletal:         General: Normal range of motion.   Skin:     General: Skin is warm.      Findings: Lesion (Sacral area has an approximately 8 by 8 cm area of necrotic tissue eschar with some bogginess concerning for early signs of infection will need debridement) present.   Neurological:      General: No focal deficit present.      Mental Status: He is oriented to person, place, and time.         Assessment:       Problem List Items Addressed This Visit        Derm    Pressure ulcer of left heel, unstageable    Pressure ulcer of sacral region, unstageable       Renal/    RESOLVED: RAHAT  (acute kidney injury) - Primary    Relevant Orders    Case Request Operating Room: Insertion,catheter,tunneled (Completed)    Blood culture (site 1) (Completed)    Blood culture (site 2) (Completed)    Inpatient consult to Registered Dietitian/Nutritionist (Completed)    POCT ARTERIAL BLOOD GAS Blood Gas (Completed)    X-Ray Chest 1 View (Completed)       ID    * (Principal) Denice gangrene       Oncology    Acute blood loss anemia    Relevant Orders    Prepare RBC 2 Units; significant anemia, symptomatic (Completed)    Transfuse RBC 2 Units (Completed)    Type & Screen (Completed)    Misc nursing order (specify) (Completed)    Prepare RBC 1 Unit (Completed)    Transfuse RBC 1 Unit (Completed)    Type & Screen (Completed)       Orthopedic    Necrotizing fasciitis    Relevant Orders    Case Request Operating Room: WASHOUT (Completed)    Case Request Operating Room: LAPAROTOMY, EXPLORATORY (Completed)    Case Request Operating Room: WASHOUT (Completed)    Case Request Operating Room: WASHOUT (Completed)    Case Request Operating Room: WASHOUT (Completed)    Case Request Operating Room: APPLICATION, GRAFT, SKIN, TO MAJOR WOUND (Completed)    Case Request Operating Room: EGD, WITH PEG TUBE INSERTION (Completed)    Case Request Operating Room: DEBRIDEMENT, WOUND, SACRUM (Completed)    Open wound of right hand without foreign body      Other Visit Diagnoses     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  Risks and benefits explained to the patient and family.  All questions were answered.

## 2022-03-24 NOTE — PLAN OF CARE
Per IDT meeting continue current plan of care. SS will continue to follow for discharge needs once pt is medically ready for discharge.

## 2022-03-24 NOTE — PROGRESS NOTES
[facility]  Nephrology  Progress Note    Patient Name: Og Garcia  MRN: [id number]  Admission Date: 12/23/2021  Hospital Length of Stay: 91 days  Attending Provider: Cecil Abernathy DO   Primary Care Physician: Hector Arndt DNP, FNP-C  Principal Problem:Denice gangrene    Subjective:     HPI: The patient is known from the previous nephrology consult at [facility].  He is no at Specialty and continues to need dialysis support.      Interval History: The patient is doing acceptable.  He is now on trach collar.      Review of patient's allergies indicates:  No Known Allergies  Current Facility-Administered Medications   Medication Frequency    0.9%  NaCl infusion PRN    acetaminophen tablet 500 mg Q6H PRN    acetic acid 0.25 % irrigation PRN    albuterol nebulizer solution 2.5 mg Q4H PRN    albuterol-ipratropium 2.5 mg-0.5 mg/3 mL nebulizer solution 3 mL Q6H    bisacodyL EC tablet 10 mg Daily PRN    calcium gluconate 1 g in dextrose 5 % in water (D5W) 5 % 100 mL IVPB (MB+) Once    collagenase ointment Daily PRN    dextrose 50% injection 12.5 g PRN    diltiaZEM tablet 90 mg Q6H    fentaNYL 25 mcg/hr 1 patch Q72H    glucagon (human recombinant) injection 1 mg PRN    guaiFENesin 100 mg/5 ml syrup 200 mg Q6H    heparin (porcine) injection 4,000 Units PRN    hydrALAZINE tablet 25 mg Q8H    insulin aspart U-100 injection 1-10 Units Q6H    insulin detemir U-100 injection 30 Units QHS    mupirocin 2 % ointment BID    ondansetron injection 8 mg Q6H PRN    pantoprazole suspension 40 mg Daily    predniSONE tablet 10 mg Daily    sevelamer carbonate pwpk 0.8 g TID WM    simethicone chewable tablet 80 mg TID PRN       Objective:     Vital Signs (Most Recent):  Temp: 99.1 °F (37.3 °C) (03/24/22 0300)  Pulse: 108 (03/24/22 1201)  Resp: 17 (03/24/22 1201)  BP: 124/70 (03/24/22 0700)  SpO2: 100 % (03/24/22 1201)  O2 Device (Oxygen Therapy): ventilator (03/24/22 1201) Vital Signs  (24h Range):  Temp:  [97.4 °F (36.3 °C)-99.1 °F (37.3 °C)] 99.1 °F (37.3 °C)  Pulse:  [] 108  Resp:  [8-19] 17  SpO2:  [100 %] 100 %  BP: (105-174)/(59-92) 124/70     Weight: 78.6 kg (173 lb 4.5 oz) (03/24/22 0400)  Body mass index is 24.17 kg/m².  Body surface area is 1.98 meters squared.    I/O last 3 completed shifts:  In: 2670 [NG/GT:2670]  Out: 200 [Stool:200]    Physical Exam  Vitals reviewed.   HENT:      Head: Atraumatic.   Cardiovascular:      Rate and Rhythm: Regular rhythm.      Heart sounds: Normal heart sounds.   Pulmonary:      Effort: Pulmonary effort is normal.   Abdominal:      General: Abdomen is flat.      Palpations: Abdomen is soft.   Neurological:      Mental Status: He is alert.       Significant Labs:  BMP:   Recent Labs   Lab 03/18/22  0735 03/18/22  1509   *  --    *  --    K 4.7  --    CL 91*  --    CO2 21  --    BUN 96*  --    CREATININE 4.32*  --    CALCIUM 7.9*  --    MG  --  2.4*     CBC:   Recent Labs   Lab 03/18/22  0735 03/18/22  2106   WBC 20.57*  --    RBC 2.10*  --    HGB 6.2* 8.0*   HCT 19.1* 24.3*     --    MCV 91.0  --    MCH 29.5  --    MCHC 32.5  --         Significant Imaging:  Labs: Reviewed    Assessment/Plan:     ESRD (end stage renal disease)  3/22/2022 Continue with dialysis as scheduled.  3/24/2022 The patient continues to do well with dialysis.        Thank you for your consult. I will follow-up with patient. Please contact us if you have any additional questions.    Edwin Pryor Jr, MD  Nephrology  [facility]

## 2022-03-24 NOTE — PROGRESS NOTES
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 male with wounds to abdomen, sacral, left lateral lower leg, right anterior foot, right hand, Left posterior heel, and right lateral and medial foot. Skin graft on abdomen intact and healing well. Sacral wound continues to worsen, wound is necrotic with moderate amount of green drainage. Wounds on feet and heels also have necrotic tissue. Patient scheduled for debridement in OR tomorrow.     Review of Systems   Unable to perform ROS: Acuity of condition     Objective:     Vital Signs (Most Recent):  Temp: 99.1 °F (37.3 °C) (03/24/22 0300)  Pulse: 90 (03/24/22 0735)  Resp: 16 (03/24/22 0735)  BP: 124/70 (03/24/22 0700)  SpO2: 100 % (03/24/22 0735) Vital Signs (24h Range):  Temp:  [97.4 °F (36.3 °C)-99.1 °F (37.3 °C)] 99.1 °F (37.3 °C)  Pulse:  [] 90  Resp:  [8-19] 16  SpO2:  [100 %] 100 %  BP: (105-180)/(59-92) 124/70     Weight: 78.6 kg (173 lb 4.5 oz)  Body mass index is 24.17 kg/m².    Physical Exam  Vitals reviewed.   Constitutional:       Appearance: He is ill-appearing.   HENT:      Head: Normocephalic.   Cardiovascular:      Rate and Rhythm: Normal rate and regular rhythm.      Pulses: Normal pulses.      Heart sounds: Normal heart sounds.   Pulmonary:      Comments: Intubated  Skin:     Findings: Erythema present.      Comments: Wound, see wound assessment and pictures   Neurological:      Mental Status: He is alert. Mental status is at baseline.      Motor: Weakness present.         Assessment and Plan       Assessment:  1. Abdominal wound/Skin graft  2. Right thigh donor site  3. Sacral wound  4.  Lower extremity wounds     Plan:   1. Abdomen-continue mepitel  2.Silvedene BLE  3. Pack sacral wound with Vashe  3. Turn every 2 hours, change dressing on Friday and PRN for soilage, keep pressure off sacral and heels.           Signature:  HERSON Viveros  Wound Care    Date of encounter: 3/24/2022

## 2022-03-24 NOTE — NURSING
In bed with eyes opened, NADN, resting on c-pap via trach, no s/s pain @ present, safety measures ongoing.

## 2022-03-24 NOTE — SUBJECTIVE & OBJECTIVE
Interval History:  Patient without complaints more alert    Objective:     Vital Signs (Most Recent):  Temp: 99.1 °F (37.3 °C) (03/24/22 0300)  Pulse: 89 (03/24/22 0615)  Resp: 12 (03/24/22 0615)  BP: (!) 144/74 (03/24/22 0615)  SpO2: 100 % (03/24/22 0615)   Vital Signs (24h Range):  Temp:  [97.4 °F (36.3 °C)-99.1 °F (37.3 °C)] 99.1 °F (37.3 °C)  Pulse:  [] 89  Resp:  [8-19] 12  SpO2:  [100 %] 100 %  BP: (105-180)/(59-92) 144/74     Weight: 78.6 kg (173 lb 4.5 oz)  Body mass index is 24.17 kg/m².      Intake/Output Summary (Last 24 hours) at 3/24/2022 0639  Last data filed at 3/24/2022 0600  Gross per 24 hour   Intake 1560 ml   Output 200 ml   Net 1360 ml       Physical Exam  Vitals reviewed.   Constitutional:       Appearance: Normal appearance.      Interventions: He is not intubated.  HENT:      Head: Normocephalic and atraumatic.      Nose: Nose normal.      Mouth/Throat:      Mouth: Mucous membranes are dry.      Pharynx: Oropharynx is clear.   Eyes:      Extraocular Movements: Extraocular movements intact.      Conjunctiva/sclera: Conjunctivae normal.      Pupils: Pupils are equal, round, and reactive to light.   Cardiovascular:      Rate and Rhythm: Normal rate.      Heart sounds: Normal heart sounds. No murmur heard.  Pulmonary:      Effort: Pulmonary effort is normal. He is not intubated.      Breath sounds: Normal breath sounds.   Abdominal:      General: Abdomen is flat. Bowel sounds are normal.      Palpations: Abdomen is soft.   Musculoskeletal:         General: Normal range of motion.      Cervical back: Normal range of motion and neck supple.      Right lower leg: No edema.      Left lower leg: No edema.   Skin:     General: Skin is warm and dry.      Capillary Refill: Capillary refill takes less than 2 seconds.   Neurological:      General: No focal deficit present.      Mental Status: He is alert and oriented to person, place, and time.   Psychiatric:         Mood and Affect: Mood normal.          Behavior: Behavior normal.       Vents:  Vent Mode: CPAP/PSV (03/24/22 0440)  Set Rate: 14 BPM (03/22/22 0258)  Vt Set: 480 mL (03/22/22 0258)  Pressure Support: 12 cmH20 (03/24/22 0440)  PEEP/CPAP: 5 cmH20 (03/24/22 0440)  Oxygen Concentration (%): 35 (03/24/22 0440)  Peak Airway Pressure: 18 cmH2O (03/24/22 0440)  Plateau Pressure: 20 cmH20 (03/07/22 0500)  Total Ve: 8 mL (03/24/22 0440)  F/VT Ratio<105 (RSBI): (!) 25.81 (03/24/22 0440)    Lines/Drains/Airways       Peripherally Inserted Central Catheter Line  Duration             PICC Double Lumen 01/05/22 left basilic 78 days              Central Venous Catheter Line  Duration                  Hemodialysis Catheter 12/30/21 0900 right internal jugular 83 days              Drain  Duration                  Colostomy 12/18/21 1030 Descending/sigmoid LUQ 95 days         Gastrostomy/Enterostomy 03/09/22 1436 Percutaneous endoscopic gastrostomy (PEG) midline feeding 14 days              Airway  Duration                  Surgical Airway 01/04/22 Shiley 79 days                    Significant Labs:    CBC/Anemia Profile:  No results for input(s): WBC, HGB, HCT, PLT, MCV, RDW, IRON, FERRITIN, RETIC, FOLATE, [id number], OCCULTBLOOD in the last 48 hours.     Chemistries:  No results for input(s): NA, K, CL, CO2, BUN, CREATININE, CALCIUM, ALBUMIN, PROT, BILITOT, ALKPHOS, ALT, AST, GLUCOSE, MG, PHOS in the last 48 hours.    All pertinent labs within the past 24 hours have been reviewed.    Significant Imaging:  I have reviewed all pertinent imaging results/findings within the past 24 hours.

## 2022-03-24 NOTE — ASSESSMENT & PLAN NOTE
3/22/2022 Continue with dialysis as scheduled.  3/24/2022 The patient continues to do well with dialysis.

## 2022-03-24 NOTE — PLAN OF CARE
Problem: Communication Impairment (Mechanical Ventilation, Invasive)  Goal: Effective Communication  Outcome: Ongoing, Progressing     Problem: Gas Exchange Impaired  Goal: Optimal Gas Exchange  Outcome: Ongoing, Progressing

## 2022-03-24 NOTE — ASSESSMENT & PLAN NOTE
2/26 -Tmax 101.4 -- resp culture - with GNB - will start Zosyn today - follow cultures   BC x 2 NGTD   2/28 - resp culture with pseudomonas and klebsiella  treat with zosyn for 7 days   Blood culture from 2/25 also growing Pseudomonas. Sensitive to zosyn. Will need to continue zosyn for at least 10 days total  3/9 He has completed antibiotics  No fever

## 2022-03-24 NOTE — NURSING
Rec'd pt in bed asleep, eyes opened to verbal stimuli, on c-pap via trach collar, resp unlabored, NADN, continuous tube feeding infusing, (L) picc line observed, no s/s pain, safety measures maintained.

## 2022-03-24 NOTE — SUBJECTIVE & OBJECTIVE
Interval History: The patient is doing acceptable.  He is now on trach collar.      Review of patient's allergies indicates:  No Known Allergies  Current Facility-Administered Medications   Medication Frequency    0.9%  NaCl infusion PRN    acetaminophen tablet 500 mg Q6H PRN    acetic acid 0.25 % irrigation PRN    albuterol nebulizer solution 2.5 mg Q4H PRN    albuterol-ipratropium 2.5 mg-0.5 mg/3 mL nebulizer solution 3 mL Q6H    bisacodyL EC tablet 10 mg Daily PRN    calcium gluconate 1 g in dextrose 5 % in water (D5W) 5 % 100 mL IVPB (MB+) Once    collagenase ointment Daily PRN    dextrose 50% injection 12.5 g PRN    diltiaZEM tablet 90 mg Q6H    fentaNYL 25 mcg/hr 1 patch Q72H    glucagon (human recombinant) injection 1 mg PRN    guaiFENesin 100 mg/5 ml syrup 200 mg Q6H    heparin (porcine) injection 4,000 Units PRN    hydrALAZINE tablet 25 mg Q8H    insulin aspart U-100 injection 1-10 Units Q6H    insulin detemir U-100 injection 30 Units QHS    mupirocin 2 % ointment BID    ondansetron injection 8 mg Q6H PRN    pantoprazole suspension 40 mg Daily    predniSONE tablet 10 mg Daily    sevelamer carbonate pwpk 0.8 g TID WM    simethicone chewable tablet 80 mg TID PRN       Objective:     Vital Signs (Most Recent):  Temp: 99.1 °F (37.3 °C) (03/24/22 0300)  Pulse: 108 (03/24/22 1201)  Resp: 17 (03/24/22 1201)  BP: 124/70 (03/24/22 0700)  SpO2: 100 % (03/24/22 1201)  O2 Device (Oxygen Therapy): ventilator (03/24/22 1201) Vital Signs (24h Range):  Temp:  [97.4 °F (36.3 °C)-99.1 °F (37.3 °C)] 99.1 °F (37.3 °C)  Pulse:  [] 108  Resp:  [8-19] 17  SpO2:  [100 %] 100 %  BP: (105-174)/(59-92) 124/70     Weight: 78.6 kg (173 lb 4.5 oz) (03/24/22 0400)  Body mass index is 24.17 kg/m².  Body surface area is 1.98 meters squared.    I/O last 3 completed shifts:  In: 2670 [NG/GT:2670]  Out: 200 [Stool:200]    Physical Exam  Vitals reviewed.   HENT:      Head: Atraumatic.   Cardiovascular:      Rate and Rhythm: Regular  rhythm.      Heart sounds: Normal heart sounds.   Pulmonary:      Effort: Pulmonary effort is normal.   Abdominal:      General: Abdomen is flat.      Palpations: Abdomen is soft.   Neurological:      Mental Status: He is alert.       Significant Labs:  BMP:   Recent Labs   Lab 03/18/22  0735 03/18/22  1509   *  --    *  --    K 4.7  --    CL 91*  --    CO2 21  --    BUN 96*  --    CREATININE 4.32*  --    CALCIUM 7.9*  --    MG  --  2.4*     CBC:   Recent Labs   Lab 03/18/22  0735 03/18/22  2106   WBC 20.57*  --    RBC 2.10*  --    HGB 6.2* 8.0*   HCT 19.1* 24.3*     --    MCV 91.0  --    MCH 29.5  --    MCHC 32.5  --         Significant Imaging:  Labs: Reviewed

## 2022-03-24 NOTE — PROGRESS NOTES
[facility]  Pulmonology  Progress Note    Patient Name: Og Garcia  MRN: [id number]  Admission Date: 12/23/2021  Hospital Length of Stay: 91 days  Code Status: Prior  Attending Provider: Cecil Abernathy DO  Primary Care Provider: Hector Arndt DNP, FNP-C   Principal Problem: Denice gangrene    Subjective:     Interval History:  Patient without complaints more alert    Objective:     Vital Signs (Most Recent):  Temp: 99.1 °F (37.3 °C) (03/24/22 0300)  Pulse: 89 (03/24/22 0615)  Resp: 12 (03/24/22 0615)  BP: (!) 144/74 (03/24/22 0615)  SpO2: 100 % (03/24/22 0615)   Vital Signs (24h Range):  Temp:  [97.4 °F (36.3 °C)-99.1 °F (37.3 °C)] 99.1 °F (37.3 °C)  Pulse:  [] 89  Resp:  [8-19] 12  SpO2:  [100 %] 100 %  BP: (105-180)/(59-92) 144/74     Weight: 78.6 kg (173 lb 4.5 oz)  Body mass index is 24.17 kg/m².      Intake/Output Summary (Last 24 hours) at 3/24/2022 0639  Last data filed at 3/24/2022 0600  Gross per 24 hour   Intake 1560 ml   Output 200 ml   Net 1360 ml       Physical Exam  Vitals reviewed.   Constitutional:       Appearance: Normal appearance.      Interventions: He is not intubated.  HENT:      Head: Normocephalic and atraumatic.      Nose: Nose normal.      Mouth/Throat:      Mouth: Mucous membranes are dry.      Pharynx: Oropharynx is clear.   Eyes:      Extraocular Movements: Extraocular movements intact.      Conjunctiva/sclera: Conjunctivae normal.      Pupils: Pupils are equal, round, and reactive to light.   Cardiovascular:      Rate and Rhythm: Normal rate.      Heart sounds: Normal heart sounds. No murmur heard.  Pulmonary:      Effort: Pulmonary effort is normal. He is not intubated.      Breath sounds: Normal breath sounds.   Abdominal:      General: Abdomen is flat. Bowel sounds are normal.      Palpations: Abdomen is soft.   Musculoskeletal:         General: Normal range of motion.      Cervical back: Normal range of motion and neck supple.      Right  lower leg: No edema.      Left lower leg: No edema.   Skin:     General: Skin is warm and dry.      Capillary Refill: Capillary refill takes less than 2 seconds.   Neurological:      General: No focal deficit present.      Mental Status: He is alert and oriented to person, place, and time.   Psychiatric:         Mood and Affect: Mood normal.         Behavior: Behavior normal.       Vents:  Vent Mode: CPAP/PSV (03/24/22 0440)  Set Rate: 14 BPM (03/22/22 0258)  Vt Set: 480 mL (03/22/22 0258)  Pressure Support: 12 cmH20 (03/24/22 0440)  PEEP/CPAP: 5 cmH20 (03/24/22 0440)  Oxygen Concentration (%): 35 (03/24/22 0440)  Peak Airway Pressure: 18 cmH2O (03/24/22 0440)  Plateau Pressure: 20 cmH20 (03/07/22 0500)  Total Ve: 8 mL (03/24/22 0440)  F/VT Ratio<105 (RSBI): (!) 25.81 (03/24/22 0440)    Lines/Drains/Airways       Peripherally Inserted Central Catheter Line  Duration             PICC Double Lumen 01/05/22 left basilic 78 days              Central Venous Catheter Line  Duration                  Hemodialysis Catheter 12/30/21 0900 right internal jugular 83 days              Drain  Duration                  Colostomy 12/18/21 1030 Descending/sigmoid LUQ 95 days         Gastrostomy/Enterostomy 03/09/22 1436 Percutaneous endoscopic gastrostomy (PEG) midline feeding 14 days              Airway  Duration                  Surgical Airway 01/04/22 Shiley 79 days                    Significant Labs:    CBC/Anemia Profile:  No results for input(s): WBC, HGB, HCT, PLT, MCV, RDW, IRON, FERRITIN, RETIC, FOLATE, [id number], OCCULTBLOOD in the last 48 hours.     Chemistries:  No results for input(s): NA, K, CL, CO2, BUN, CREATININE, CALCIUM, ALBUMIN, PROT, BILITOT, ALKPHOS, ALT, AST, GLUCOSE, MG, PHOS in the last 48 hours.    All pertinent labs within the past 24 hours have been reviewed.    Significant Imaging:  I have reviewed all pertinent imaging results/findings within the past 24 hours.    Assessment/Plan:     * Denice  gangrene  - s/p multiple debridements  - wound vac in place  - ID consulted for antibiotic management: He was treated with rocephin, daptomycin, clindamycin. 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 Cazares  2/21- back to OR for skin grafting  3/9- had peg tube placed   03/23/2022 no changes    On mechanically assisted ventilation  - 12/14 intubated, tracheostomy 1/4  - continuous PSV/CPAP, had to be switched back to AC due to apnea spells  3/14- on cpap this am and doing well. Will try for continuous cpap again. Only place back on assist control if apnea causes drop in sat  3/15- doing well and no issues overnight. Hopefully can try some trach collar in the next couple of days  3/16- Had an episode earlier today that required patient to be placed back on ventilator. He was suctioned and had improvement  We can start T-tube trials patient continues to improve    Hypertension  Still elevated review medicines      Fever  2/26 -Tmax 101.4 -- resp culture - with GNB - will start Zosyn today - follow cultures   BC x 2 NGTD   2/28 - resp culture with pseudomonas and klebsiella  treat with zosyn for 7 days   Blood culture from 2/25 also growing Pseudomonas. Sensitive to zosyn. Will need to continue zosyn for at least 10 days total  3/9 He has completed antibiotics  No fever    ESRD (end stage renal disease)  Started on HD 12/30   Continue with HD per nephrology      Type 2 diabetes mellitus without complication, without long-term current use of insulin  Review meds still elevated      Acute blood loss anemia  No active bleeding hemoglobin is up to 8                 Jose Urban MD  Pulmonology  [facility]

## 2022-03-25 PROBLEM — R50.9 FEVER: Status: RESOLVED | Noted: 2022-01-01 | Resolved: 2022-01-01

## 2022-03-25 NOTE — NURSING
0916: Patients Blood glucose is 40. D50 IVP given.   Recheck of blood glucose 89  0918: Patient taken to surgery  1030: Patient back in room no distress noted sacral dressing dry and intact no bleeding noted. Vitals stable. Tube feeding restarted at this time.

## 2022-03-25 NOTE — SUBJECTIVE & OBJECTIVE
Interval History:  Patient without complaints    Objective:     Vital Signs (Most Recent):  Temp: 99.1 °F (37.3 °C) (03/25/22 0300)  Pulse: 98 (03/25/22 0435)  Resp: 20 (03/25/22 0435)  BP: (!) 157/82 (03/25/22 0400)  SpO2: 100 % (03/25/22 0435)   Vital Signs (24h Range):  Temp:  [98.6 °F (37 °C)-99.6 °F (37.6 °C)] 99.1 °F (37.3 °C)  Pulse:  [] 98  Resp:  [10-35] 20  SpO2:  [91 %-100 %] 100 %  BP: (124-183)/(62-94) 157/82     Weight: 78.6 kg (173 lb 4.5 oz)  Body mass index is 24.17 kg/m².      Intake/Output Summary (Last 24 hours) at 3/25/2022 0658  Last data filed at 3/25/2022 0000  Gross per 24 hour   Intake 150 ml   Output 100 ml   Net 50 ml       Physical Exam  Vitals reviewed.   Constitutional:       Appearance: Normal appearance.      Interventions: He is not intubated.  HENT:      Head: Normocephalic and atraumatic.      Nose: Nose normal.      Mouth/Throat:      Mouth: Mucous membranes are dry.      Pharynx: Oropharynx is clear.   Eyes:      Extraocular Movements: Extraocular movements intact.      Conjunctiva/sclera: Conjunctivae normal.      Pupils: Pupils are equal, round, and reactive to light.   Cardiovascular:      Rate and Rhythm: Normal rate.      Heart sounds: Normal heart sounds. No murmur heard.  Pulmonary:      Effort: Pulmonary effort is normal. He is not intubated.      Breath sounds: Normal breath sounds.   Abdominal:      General: Abdomen is flat. Bowel sounds are normal.      Palpations: Abdomen is soft.   Musculoskeletal:         General: Normal range of motion.      Cervical back: Normal range of motion and neck supple.      Right lower leg: No edema.      Left lower leg: No edema.   Skin:     General: Skin is warm and dry.      Capillary Refill: Capillary refill takes less than 2 seconds.   Neurological:      General: No focal deficit present.      Mental Status: He is alert and oriented to person, place, and time.   Psychiatric:         Mood and Affect: Mood normal.          Behavior: Behavior normal.       Vents:  Vent Mode: CPAP/PSV (03/25/22 0435)  Set Rate: 14 BPM (03/22/22 0258)  Vt Set: 480 mL (03/22/22 0258)  Pressure Support: 12 cmH20 (03/25/22 0435)  PEEP/CPAP: 5 cmH20 (03/25/22 0435)  Oxygen Concentration (%): 35 (03/25/22 0435)  Peak Airway Pressure: 18 cmH2O (03/25/22 0435)  Plateau Pressure: 20 cmH20 (03/07/22 0500)  Total Ve: 7.2 mL (03/25/22 0435)  F/VT Ratio<105 (RSBI): (!) 42.74 (03/25/22 0435)    Lines/Drains/Airways       Peripherally Inserted Central Catheter Line  Duration             PICC Double Lumen 01/05/22 left basilic 79 days              Central Venous Catheter Line  Duration                  Hemodialysis Catheter 12/30/21 0900 right internal jugular 84 days              Drain  Duration                  Colostomy 12/18/21 1030 Descending/sigmoid LUQ 96 days         Gastrostomy/Enterostomy 03/09/22 1436 Percutaneous endoscopic gastrostomy (PEG) midline feeding 15 days              Airway  Duration                  Surgical Airway 01/04/22 Shiley 80 days                    Significant Labs:    CBC/Anemia Profile:  No results for input(s): WBC, HGB, HCT, PLT, MCV, RDW, IRON, FERRITIN, RETIC, FOLATE, [id number], OCCULTBLOOD in the last 48 hours.     Chemistries:  No results for input(s): NA, K, CL, CO2, BUN, CREATININE, CALCIUM, ALBUMIN, PROT, BILITOT, ALKPHOS, ALT, AST, GLUCOSE, MG, PHOS in the last 48 hours.    All pertinent labs within the past 24 hours have been reviewed.    Significant Imaging:  I have reviewed all pertinent imaging results/findings within the past 24 hours.

## 2022-03-25 NOTE — NURSING
Awake in bed. Resp even and unlabored. No signs of distress. Blood glucose is 60. D5W 12.5mg IV push given. Will recheck in 30 minutes.

## 2022-03-25 NOTE — PLAN OF CARE
Problem: Adult Inpatient Plan of Care  Goal: Plan of Care Review  Outcome: Ongoing, Progressing  Goal: Patient-Specific Goal (Individualized)  Outcome: Ongoing, Progressing  Goal: Absence of Hospital-Acquired Illness or Injury  Outcome: Ongoing, Progressing  Goal: Optimal Comfort and Wellbeing  Outcome: Ongoing, Progressing     Problem: Communication Impairment (Mechanical Ventilation, Invasive)  Goal: Effective Communication  Outcome: Ongoing, Progressing     Problem: Device-Related Complication Risk (Mechanical Ventilation, Invasive)  Goal: Optimal Device Function  Outcome: Ongoing, Progressing     Problem: Inability to Wean (Mechanical Ventilation, Invasive)  Goal: Mechanical Ventilation Liberation  Outcome: Ongoing, Progressing     Problem: Nutrition Impairment (Mechanical Ventilation, Invasive)  Goal: Optimal Nutrition Delivery  Outcome: Ongoing, Progressing     Problem: Ventilator-Induced Lung Injury (Mechanical Ventilation, Invasive)  Goal: Absence of Ventilator-Induced Lung Injury  Outcome: Ongoing, Progressing     Problem: Communication Impairment (Artificial Airway)  Goal: Effective Communication  Outcome: Ongoing, Progressing     Problem: Device-Related Complication Risk (Artificial Airway)  Goal: Optimal Device Function  Outcome: Ongoing, Progressing     Problem: Skin and Tissue Injury (Artificial Airway)  Goal: Absence of Device-Related Skin or Tissue Injury  Outcome: Ongoing, Progressing     Problem: Gas Exchange Impaired  Goal: Optimal Gas Exchange  Outcome: Ongoing, Progressing

## 2022-03-25 NOTE — ANESTHESIA PREPROCEDURE EVALUATION
03/25/2022  Og Garcia is a 66 y.o., male. Denice's gangrene    Pre-op Assessment    I have reviewed the Patient Summary Reports.    I have reviewed the Nursing Notes. I have reviewed the NPO Status.   I have reviewed the Medications.     Review of Systems  Anesthesia Hx:  No problems with previous Anesthesia    Social:  Non-Smoker, No Alcohol Use    Hematology/Oncology:  Hematology Normal   Oncology Normal     EENT/Dental:EENT/Dental Normal   Cardiovascular:   Hypertension    Pulmonary:  Pulmonary Normal Respiratory failure / vent dependent   Renal/:   Chronic Renal Disease, ESRD    Hepatic/GI:  Hepatic/GI Normal    Musculoskeletal:  Musculoskeletal Normal Denice gangrene   Neurological:   CVA    Endocrine:   Diabetes, poorly controlled, type 2    Dermatological:  Skin Normal Necrotizing fasciitis / Denice gangrene   Psych:  Psychiatric Normal                                                                                                                    03/25/2022  Og Garcia is a 66 y.o., male. Denice's gangrene    Pre-op Assessment    I have reviewed the Patient Summary Reports.     I have reviewed the Nursing Notes. I have reviewed the NPO Status.   I have reviewed the Medications.     Review of Systems  Anesthesia Hx:  No problems with previous Anesthesia    Social:  Non-Smoker, No Alcohol Use    Hematology/Oncology:  Hematology Normal   Oncology Normal     EENT/Dental:EENT/Dental Normal   Cardiovascular:   Hypertension Sepsis / on levophed   Pulmonary:  Pulmonary Normal Respiratory failure on vent   Renal/:   Chronic Renal Disease, ESRD, Dialysis    Hepatic/GI:  Hepatic/GI Normal Denice Gangrene, necrotizing fasciitis -  s/p multiple debridements   Musculoskeletal:  Musculoskeletal Normal    Neurological:  Neurology Normal    Endocrine:  Endocrine Normal     Dermatological:  Skin Normal    Psych:  Psychiatric Normal           Physical Exam  General:  Well nourished    Airway/Jaw/Neck:  Airway Findings: Mouth Opening: Normal General Airway Assessment: Adult  Mallampati: III     Eyes/Ears/Nose:  Eyes/Ears/Nose Findings:     Chest/Lungs:  Chest/Lungs Findings: Rales, Basilar     Heart/Vascular:  Heart Findings: Rate: Normal  Rhythm: Regular Rhythm        Mental Status:  Mental Status Findings:  Cooperative, Alert and Oriented         Anesthesia Plan  Type of Anesthesia, risks & benefits discussed:  Anesthesia Type:  general    Patient's Preference:   Plan Factors:          Intra-op Monitoring Plan: standard ASA monitors  Intra-op Monitoring Plan Comments:   Post Op Pain Control Plan: per primary service following discharge from PACU and multimodal analgesia  Post Op Pain Control Plan Comments:     Induction:   IV  Beta Blocker:  Patient is not currently on a Beta-Blocker (No further documentation required).       Informed Consent: Patient understands risks and agrees with Anesthesia plan.  Questions answered. Anesthesia consent signed with patient.  ASA Score: 4  emergent   Day of Surgery Review of History & Physical: I have interviewed and examined the patient. I have reviewed the patient's H&P dated:  There are no significant changes.          Ready For Surgery From Anesthesia Perspective.           Physical Exam  General:  Well nourished      Airway/Jaw/Neck:  Airway Findings: Mouth Opening: Normal   Tongue: Normal   General Airway Assessment: Adult Mallampati: III  Improves to III with phonation.  TM Distance: > 6 cm   Neck ROM: Normal ROM      Eyes/Ears/Nose:  Eyes/Ears/Nose Findings:     Chest/Lungs:  Chest/Lungs Findings: Rales, Basilar      Heart/Vascular:  Heart Findings: Rate: Tachycardia  Rhythm: Regular Rhythm        Mental Status:  Mental Status Findings:  Cooperative, Alert and Oriented         Anesthesia Plan  Type of Anesthesia, risks & benefits  discussed:  Anesthesia Type:  general    Patient's Preference:   Plan Factors:          Intra-op Monitoring Plan: standard ASA monitors  Intra-op Monitoring Plan Comments:   Post Op Pain Control Plan: per primary service following discharge from PACU and multimodal analgesia  Post Op Pain Control Plan Comments:     Induction:   IV  Beta Blocker:  Patient is not currently on a Beta-Blocker (No further documentation required).       Informed Consent: Informed consent signed with the Patient and all parties understand the risks and agree with anesthesia plan.  All questions answered.  Anesthesia consent signed with patient.  ASA Score: 4     Day of Surgery Review of History & Physical: I have interviewed and examined the patient. I have reviewed the patient's H&P dated:  There are no significant changes.   H&P completed by Anesthesiologist.         Ready For Surgery From Anesthesia Perspective.           Physical Exam  General: Well nourished    Airway:  Mallampati: III / III  Mouth Opening: Normal  TM Distance: > 6 cm  Tongue: Normal  Neck ROM: Normal ROM    Chest/Lungs:  Rales, Basilar    Heart:  Rate: Tachycardia  Rhythm: Regular Rhythm          Anesthesia Plan  Type of Anesthesia, risks & benefits discussed:    Anesthesia Type: general  Intra-op Monitoring Plan: standard ASA monitors  Post Op Pain Control Plan: per primary service following discharge from PACU and multimodal analgesia  Induction:  IV  Informed Consent: Informed consent signed with the Patient and all parties understand the risks and agree with anesthesia plan.  All questions answered. Patient consented to blood products? Yes  ASA Score: 4  Day of Surgery Review of History & Physical: I have interviewed and examined the patient. I have reviewed the patient's H&P dated: There are no significant changes. H&P completed by Anesthesiologist.    Ready For Surgery From Anesthesia Perspective.       .

## 2022-03-25 NOTE — PT/OT/SLP PROGRESS
Physical Therapy      Patient Name:  Og Garcia   MRN:  [id number]    Patient not seen today secondary to Off the floor for procedure/surgery (debridement of sacral wound. Full thickness to the bone per op note.). Will follow-up tomorrow.

## 2022-03-25 NOTE — ASSESSMENT & PLAN NOTE
3/22/2022 Continue with dialysis as scheduled.  3/24/2022 The patient continues to do well with dialysis.  3/25/2022  Continue with dialysis as scheduled.

## 2022-03-25 NOTE — PROGRESS NOTES
[facility]  Nephrology  Progress Note    Patient Name: Og Garcia  MRN: [id number]  Admission Date: 12/23/2021  Hospital Length of Stay: 92 days  Attending Provider: Cecil Abernathy DO   Primary Care Physician: Hector Arndt DNP, FNP-C  Principal Problem:Denice gangrene    Subjective:     HPI: The patient is known from the previous nephrology consult at [facility].  He is no at Specialty and continues to need dialysis support.      Interval History: The patient is doing acceptable.  He has been tolerating trach collar.  Dialysis is scheduled for today.    Review of patient's allergies indicates:  No Known Allergies  Current Facility-Administered Medications   Medication Frequency    0.9%  NaCl infusion PRN    acetaminophen tablet 500 mg Q6H PRN    acetic acid 0.25 % irrigation PRN    albuterol nebulizer solution 2.5 mg Q4H PRN    albuterol-ipratropium 2.5 mg-0.5 mg/3 mL nebulizer solution 3 mL Q6H    bisacodyL EC tablet 10 mg Daily PRN    calcium gluconate 1 g in dextrose 5 % in water (D5W) 5 % 100 mL IVPB (MB+) Once    collagenase ointment Daily PRN    dextrose 50% injection 12.5 g PRN    diltiaZEM tablet 90 mg Q6H    fentaNYL 25 mcg/hr 1 patch Q72H    glucagon (human recombinant) injection 1 mg PRN    guaiFENesin 100 mg/5 ml syrup 200 mg Q6H    heparin (porcine) injection 4,000 Units PRN    hydrALAZINE tablet 25 mg Q8H    insulin aspart U-100 injection 1-10 Units Q6H    insulin detemir U-100 injection 30 Units QHS    mupirocin 2 % ointment BID    ondansetron injection 8 mg Q6H PRN    pantoprazole suspension 40 mg Daily    predniSONE tablet 10 mg Daily    sevelamer carbonate pwpk 0.8 g TID WM    simethicone chewable tablet 80 mg TID PRN       Objective:     Vital Signs (Most Recent):  Temp: 99 °F (37.2 °C) (03/25/22 0730)  Pulse: 99 (03/25/22 0700)  Resp: 18 (03/25/22 0700)  BP: (!) 168/83 (03/25/22 0700)  SpO2: 100 % (03/25/22 0800)  O2 Device (Oxygen  Therapy): ventilator (03/25/22 0800)   Vital Signs (24h Range):  Temp:  [98.7 °F (37.1 °C)-99.6 °F (37.6 °C)] 99 °F (37.2 °C)  Pulse:  [] 105  Resp:  [12-35] 22  SpO2:  [91 %-100 %] 100 %  BP: (137-183)/(67-94) 154/74     Weight: 78.6 kg (173 lb 4.5 oz) (03/24/22 0400)  Body mass index is 24.17 kg/m².  Body surface area is 1.98 meters squared.    I/O last 3 completed shifts:  In: 1410 [NG/GT:1410]  Out: 300 [Stool:300]    Physical Exam  Vitals reviewed.   HENT:      Head: Atraumatic.   Cardiovascular:      Rate and Rhythm: Regular rhythm.      Pulses: Normal pulses.   Pulmonary:      Effort: Pulmonary effort is normal.      Breath sounds: Normal breath sounds.      Comments: Trach collar   Abdominal:      General: Abdomen is flat.      Palpations: Abdomen is soft.   Neurological:      Mental Status: He is alert.       Significant Labs:  BMP:   Recent Labs   Lab 03/18/22  1509   MG 2.4*     CBC:   Recent Labs   Lab 03/18/22  2106   HGB 8.0*   HCT 24.3*        Significant Imaging:  Labs: Reviewed    Assessment/Plan:     ESRD (end stage renal disease)  3/22/2022 Continue with dialysis as scheduled.  3/24/2022 The patient continues to do well with dialysis.  3/25/2022  Continue with dialysis as scheduled.        Thank you for your consult. I will follow-up with patient. Please contact us if you have any additional questions.    Edwin Pryor Jr, MD  Nephrology  [facility]

## 2022-03-25 NOTE — PROGRESS NOTES
Placed patientt back on the vent with previous settings for rest.  Patient did 1 hour of Trach Collar trial with no complications.

## 2022-03-25 NOTE — OP NOTE
[facility] [place] - Periop Services  Surgery Department  Operative Note    SUMMARY     Date of Procedure: 3/25/2022     Procedure: Procedure(s) (LRB):  DEBRIDEMENT, WOUND, SACRUM (N/A)     Surgeon(s) and Role:     * Harish Cazares MD - Primary    Assisting Surgeon: None    Pre-Operative Diagnosis: Necrotizing fasciitis [M72.6]    Post-Operative Diagnosis: Post-Op Diagnosis Codes:     * Necrotizing fasciitis [M72.6]    Stage IV decubitus ulcer    Anesthesia: General    Procedures Performed:  Debridement of sacral ulcer stage IV 8 x 7 cm area with necrotic tissue full-thickness all the way down to the sacral bone.    Significant Findings of the Procedure:  Full-thickness sacral ulcer     Procedure in Detail:  After informed consent patient brought to the OR prepped and draped in the usual sterile fashion.  Patient was placed on his lateral aspect and we debrided this full-thickness necrotic area on the sacrum.  Area including skin muscle tendons and touch the bone but did not involve it.  Size at the end of the case was 8 x 7 x 3 cm in size.  We irrigated the area out ensured hemostasis and placed a wet to dry the wound.  Patient tolerated the procedure well.    Complications: No    Estimated Blood Loss (EBL): * No values recorded between 3/25/2022  9:36 AM and 3/25/2022  9:55 AM *           Implants: * No implants in log *    Specimens:   Specimen (24h ago, onward)            None                  Condition: Good    Disposition: PACU - hemodynamically stable.    Attestation: I was present and scrubbed for the entire procedure.

## 2022-03-25 NOTE — PROGRESS NOTES
[facility] \[facility] [place] [facility]\""  Pulmonology  Progress Note    Patient Name: Og Garcia  MRN: [id number]  Admission Date: 12/23/2021  Hospital Length of Stay: 92 days  Code Status: Prior  Attending Provider: Cecil Abernathy DO  Primary Care Provider: Hector Arndt DNP, FNP-C   Principal Problem: Denice gangrene    Subjective:     Interval History:  Patient without complaints    Objective:     Vital Signs (Most Recent):  Temp: 99.1 °F (37.3 °C) (03/25/22 0300)  Pulse: 98 (03/25/22 0435)  Resp: 20 (03/25/22 0435)  BP: (!) 157/82 (03/25/22 0400)  SpO2: 100 % (03/25/22 0435)   Vital Signs (24h Range):  Temp:  [98.6 °F (37 °C)-99.6 °F (37.6 °C)] 99.1 °F (37.3 °C)  Pulse:  [] 98  Resp:  [10-35] 20  SpO2:  [91 %-100 %] 100 %  BP: (124-183)/(62-94) 157/82     Weight: 78.6 kg (173 lb 4.5 oz)  Body mass index is 24.17 kg/m².      Intake/Output Summary (Last 24 hours) at 3/25/2022 0658  Last data filed at 3/25/2022 0000  Gross per 24 hour   Intake 150 ml   Output 100 ml   Net 50 ml       Physical Exam  Vitals reviewed.   Constitutional:       Appearance: Normal appearance.      Interventions: He is not intubated.  HENT:      Head: Normocephalic and atraumatic.      Nose: Nose normal.      Mouth/Throat:      Mouth: Mucous membranes are dry.      Pharynx: Oropharynx is clear.   Eyes:      Extraocular Movements: Extraocular movements intact.      Conjunctiva/sclera: Conjunctivae normal.      Pupils: Pupils are equal, round, and reactive to light.   Cardiovascular:      Rate and Rhythm: Normal rate.      Heart sounds: Normal heart sounds. No murmur heard.  Pulmonary:      Effort: Pulmonary effort is normal. He is not intubated.      Breath sounds: Normal breath sounds.   Abdominal:      General: Abdomen is flat. Bowel sounds are normal.      Palpations: Abdomen is soft.   Musculoskeletal:         General: Normal range of motion.      Cervical back: Normal range of motion and neck supple.      Right lower  leg: No edema.      Left lower leg: No edema.   Skin:     General: Skin is warm and dry.      Capillary Refill: Capillary refill takes less than 2 seconds.   Neurological:      General: No focal deficit present.      Mental Status: He is alert and oriented to person, place, and time.   Psychiatric:         Mood and Affect: Mood normal.         Behavior: Behavior normal.       Vents:  Vent Mode: CPAP/PSV (03/25/22 0435)  Set Rate: 14 BPM (03/22/22 0258)  Vt Set: 480 mL (03/22/22 0258)  Pressure Support: 12 cmH20 (03/25/22 0435)  PEEP/CPAP: 5 cmH20 (03/25/22 0435)  Oxygen Concentration (%): 35 (03/25/22 0435)  Peak Airway Pressure: 18 cmH2O (03/25/22 0435)  Plateau Pressure: 20 cmH20 (03/07/22 0500)  Total Ve: 7.2 mL (03/25/22 0435)  F/VT Ratio<105 (RSBI): (!) 42.74 (03/25/22 0435)    Lines/Drains/Airways       Peripherally Inserted Central Catheter Line  Duration             PICC Double Lumen 01/05/22 left basilic 79 days              Central Venous Catheter Line  Duration                  Hemodialysis Catheter 12/30/21 0900 right internal jugular 84 days              Drain  Duration                  Colostomy 12/18/21 1030 Descending/sigmoid LUQ 96 days         Gastrostomy/Enterostomy 03/09/22 1436 Percutaneous endoscopic gastrostomy (PEG) midline feeding 15 days              Airway  Duration                  Surgical Airway 01/04/22 Shiley 80 days                    Significant Labs:    CBC/Anemia Profile:  No results for input(s): WBC, HGB, HCT, PLT, MCV, RDW, IRON, FERRITIN, RETIC, FOLATE, [id number], OCCULTBLOOD in the last 48 hours.     Chemistries:  No results for input(s): NA, K, CL, CO2, BUN, CREATININE, CALCIUM, ALBUMIN, PROT, BILITOT, ALKPHOS, ALT, AST, GLUCOSE, MG, PHOS in the last 48 hours.    All pertinent labs within the past 24 hours have been reviewed.    Significant Imaging:  I have reviewed all pertinent imaging results/findings within the past 24 hours.    Assessment/Plan:     On mechanically  assisted ventilation  - 12/14 intubated, tracheostomy 1/4  - continuous PSV/CPAP, had to be switched back to AC due to apnea spells  3/14- on cpap this am and doing well. Will try for continuous cpap again. Only place back on assist control if apnea causes drop in sat  3/15- doing well and no issues overnight. Hopefully can try some trach collar in the next couple of days  3/16- Had an episode earlier today that required patient to be placed back on ventilator. He was suctioned and had improvement  We can start T-tube trials patient continues to improve    Hypertension  No change today      ESRD (end stage renal disease)  Started on HD 12/30   Continue with HD per nephrology      Type 2 diabetes mellitus without complication, without long-term current use of insulin  Much better today                   Jose Urban MD  Pulmonology  [facility]

## 2022-03-25 NOTE — PT/OT/SLP PROGRESS
Occupational Therapy      Patient Name:  Og Wolf Garcia   MRN:  [id number]    Patient not seen today secondary to Off the floor for procedure/surgery. Will follow-up 3/26/2022  .    3/25/2022

## 2022-03-25 NOTE — TRANSFER OF CARE
Anesthesia Transfer of Care Note    Patient: [facility]    Procedure(s) Performed: Procedure(s) (LRB):  DEBRIDEMENT, WOUND, SACRUM (N/A)    Patient location: ICU    Anesthesia Type: general    Transport from OR: Continuous ECG monitoring in transport. Continuous SpO2 monitoring in transport. Continuos invasive BP monitoring in transport. Upon arrival to PACU/ICU, patient attached to ventilator and auscultated to confirm bilateral breath sounds and adequate TV. Transported from OR intubated on 100% O2 by AMBU with assisted ventilation    Post pain: adequate analgesia    Post assessment: no apparent anesthetic complications    Post vital signs: stable    Level of consciousness: obtunded and sedated    Nausea/Vomiting: no nausea/vomiting    Complications: none    Transfer of care protocol was followedComments: Accu check-89  Vent settings on A/C on previous vent settings. See respiratory notes.      Last vitals:   Visit Vitals  BP (!) 148/70 (BP Location: Right arm, Patient Position: Lying)   Pulse 104   Temp 37.2 °C (99 °F) (Oral)   Resp (!) 24   SpO2 100%

## 2022-03-25 NOTE — SUBJECTIVE & OBJECTIVE
Interval History: The patient is doing acceptable.  He has been tolerating trach collar.  Dialysis is scheduled for today.    Review of patient's allergies indicates:  No Known Allergies  Current Facility-Administered Medications   Medication Frequency    0.9%  NaCl infusion PRN    acetaminophen tablet 500 mg Q6H PRN    acetic acid 0.25 % irrigation PRN    albuterol nebulizer solution 2.5 mg Q4H PRN    albuterol-ipratropium 2.5 mg-0.5 mg/3 mL nebulizer solution 3 mL Q6H    bisacodyL EC tablet 10 mg Daily PRN    calcium gluconate 1 g in dextrose 5 % in water (D5W) 5 % 100 mL IVPB (MB+) Once    collagenase ointment Daily PRN    dextrose 50% injection 12.5 g PRN    diltiaZEM tablet 90 mg Q6H    fentaNYL 25 mcg/hr 1 patch Q72H    glucagon (human recombinant) injection 1 mg PRN    guaiFENesin 100 mg/5 ml syrup 200 mg Q6H    heparin (porcine) injection 4,000 Units PRN    hydrALAZINE tablet 25 mg Q8H    insulin aspart U-100 injection 1-10 Units Q6H    insulin detemir U-100 injection 30 Units QHS    mupirocin 2 % ointment BID    ondansetron injection 8 mg Q6H PRN    pantoprazole suspension 40 mg Daily    predniSONE tablet 10 mg Daily    sevelamer carbonate pwpk 0.8 g TID WM    simethicone chewable tablet 80 mg TID PRN       Objective:     Vital Signs (Most Recent):  Temp: 99 °F (37.2 °C) (03/25/22 0730)  Pulse: 99 (03/25/22 0700)  Resp: 18 (03/25/22 0700)  BP: (!) 168/83 (03/25/22 0700)  SpO2: 100 % (03/25/22 0800)  O2 Device (Oxygen Therapy): ventilator (03/25/22 0800)   Vital Signs (24h Range):  Temp:  [98.7 °F (37.1 °C)-99.6 °F (37.6 °C)] 99 °F (37.2 °C)  Pulse:  [] 105  Resp:  [12-35] 22  SpO2:  [91 %-100 %] 100 %  BP: (137-183)/(67-94) 154/74     Weight: 78.6 kg (173 lb 4.5 oz) (03/24/22 0400)  Body mass index is 24.17 kg/m².  Body surface area is 1.98 meters squared.    I/O last 3 completed shifts:  In: 1410 [NG/GT:1410]  Out: 300 [Stool:300]    Physical Exam  Vitals reviewed.   HENT:      Head: Atraumatic.    Cardiovascular:      Rate and Rhythm: Regular rhythm.      Pulses: Normal pulses.   Pulmonary:      Effort: Pulmonary effort is normal.      Breath sounds: Normal breath sounds.      Comments: Trach collar   Abdominal:      General: Abdomen is flat.      Palpations: Abdomen is soft.   Neurological:      Mental Status: He is alert.       Significant Labs:  BMP:   Recent Labs   Lab 03/18/22  1509   MG 2.4*     CBC:   Recent Labs   Lab 03/18/22  2106   HGB 8.0*   HCT 24.3*        Significant Imaging:  Labs: Reviewed

## 2022-03-25 NOTE — PLAN OF CARE
Problem: Communication Impairment (Mechanical Ventilation, Invasive)  Goal: Effective Communication  Outcome: Ongoing, Progressing     Problem: Inability to Wean (Mechanical Ventilation, Invasive)  Goal: Mechanical Ventilation Liberation  Outcome: Ongoing, Progressing     Problem: Nutrition Impairment (Mechanical Ventilation, Invasive)  Goal: Optimal Nutrition Delivery  Outcome: Ongoing, Progressing

## 2022-03-25 NOTE — NURSING
Blood glucose is now 85. Patient is NPO for surgery. No signs of distress. Will continue to monitor.

## 2022-03-25 NOTE — ANESTHESIA POSTPROCEDURE EVALUATION
Anesthesia Post Evaluation    Patient: [facility]    Procedure(s) Performed: Procedure(s) (LRB):  DEBRIDEMENT, WOUND, SACRUM (N/A)    Final Anesthesia Type: general      Patient location during evaluation: PACU  Patient participation: Yes- Able to Participate  Level of consciousness: awake and sedated  Post-procedure vital signs: reviewed and stable  Pain management: adequate  Airway patency: patent    PONV status at discharge: No PONV  Anesthetic complications: no      Cardiovascular status: blood pressure returned to baseline  Respiratory status: unassisted  Hydration status: euvolemic  Follow-up not needed.          Vitals Value Taken Time   /69 03/25/22 1100   Temp 36.7 °C (98.1 °F) 03/25/22 1100   Pulse 102 03/25/22 1100   Resp 19 03/25/22 1100   SpO2 100 % 03/25/22 1100         Event Time   Out of Recovery 10:30:00         Pain/Rio Score: Rio Score: 8 (3/25/2022 10:25 AM)

## 2022-03-26 NOTE — SUBJECTIVE & OBJECTIVE
Interval History: tolerating PSV- awake, alert on exam, no complaints     Objective:     Vital Signs (Most Recent):  Temp: 99 °F (37.2 °C) (03/26/22 1100)  Pulse: 108 (03/26/22 1208)  Resp: (!) 25 (03/26/22 1208)  BP: 138/71 (03/26/22 1200)  SpO2: 100 % (03/26/22 1208)   Vital Signs (24h Range):  Temp:  [97.6 °F (36.4 °C)-99.8 °F (37.7 °C)] 99 °F (37.2 °C)  Pulse:  [] 108  Resp:  [14-31] 25  SpO2:  [96 %-100 %] 100 %  BP: (104-165)/(50-82) 138/71     Weight: 78.6 kg (173 lb 4.5 oz)  Body mass index is 24.17 kg/m².      Intake/Output Summary (Last 24 hours) at 3/26/2022 1418  Last data filed at 3/26/2022 0600  Gross per 24 hour   Intake 795 ml   Output 160 ml   Net 635 ml       Physical Exam  Vitals reviewed.   HENT:      Mouth/Throat:      Mouth: Mucous membranes are moist.   Eyes:      Pupils: Pupils are equal, round, and reactive to light.   Cardiovascular:      Rate and Rhythm: Normal rate and regular rhythm.   Pulmonary:      Effort: Pulmonary effort is normal.      Breath sounds: Normal breath sounds.   Abdominal:      Palpations: Abdomen is soft.   Musculoskeletal:         General: Normal range of motion.      Cervical back: Normal range of motion and neck supple.   Skin:     General: Skin is warm and dry.      Capillary Refill: Capillary refill takes less than 2 seconds.   Neurological:      Mental Status: He is alert.       Vents:  Vent Mode: PS/CPAP (CATIE TC WELL FOR 3HRS) (03/26/22 1208)  Set Rate: 14 BPM (03/22/22 0258)  Vt Set: 480 mL (03/22/22 0258)  Pressure Support: 12 cmH20 (03/26/22 1208)  PEEP/CPAP: 5 cmH20 (03/26/22 1208)  Oxygen Concentration (%): 35 (03/26/22 0900)  Peak Airway Pressure: 18 cmH2O (03/26/22 1208)  Plateau Pressure: 20 cmH20 (03/07/22 0500)  Total Ve: 7.4 mL (03/26/22 1208)  F/VT Ratio<105 (RSBI): (!) 58.82 (03/26/22 1208)    Lines/Drains/Airways       Peripherally Inserted Central Catheter Line  Duration             PICC Double Lumen 01/05/22 left basilic 80 days               Central Venous Catheter Line  Duration                  Hemodialysis Catheter 12/30/21 0900 right internal jugular 86 days              Drain  Duration                  Colostomy 12/18/21 1030 Descending/sigmoid LUQ 98 days         Gastrostomy/Enterostomy 03/09/22 1436 Percutaneous endoscopic gastrostomy (PEG) midline feeding 16 days              Airway  Duration                  Surgical Airway 01/04/22 Shiley 81 days                    Significant Labs:    CBC/Anemia Profile:  No results for input(s): WBC, HGB, HCT, PLT, MCV, RDW, IRON, FERRITIN, RETIC, FOLATE, [id number], OCCULTBLOOD in the last 48 hours.     Chemistries:  No results for input(s): NA, K, CL, CO2, BUN, CREATININE, CALCIUM, ALBUMIN, PROT, BILITOT, ALKPHOS, ALT, AST, GLUCOSE, MG, PHOS in the last 48 hours.    All pertinent labs within the past 24 hours have been reviewed.    Significant Imaging:  I have reviewed all pertinent imaging results/findings within the past 24 hours.

## 2022-03-26 NOTE — PT/OT/SLP EVAL
Occupational Therapy   Evaluation    Name: Og Garcia  MRN: [id number]  Admitting Diagnosis:  Denice gangrene  Recent Surgery: * No surgery found *      Recommendations:     Discharge Recommendations: nursing facility, skilled, hospice facility  Discharge Equipment Recommendations:   (to be determined)  Barriers to discharge:    on going medical treatment    Assessment:     Og Garcia is a 66 y.o. male with a medical diagnosis of Denice gangrene.  He presents with necrotizing fasciitis, respiratory failure, acute renal failure. Performance deficits affecting function: weakness, impaired endurance, impaired self care skills, impaired functional mobilty, impaired cognition, decreased coordination, decreased upper extremity function, decreased safety awareness, abnormal tone, decreased ROM, impaired skin.      Rehab Prognosis: Fair; patient would benefit from acute skilled OT services to address these deficits and reach maximum level of function.       Plan:     Patient to be seen 5 x/week to address the above listed problems via self-care/home management, therapeutic exercises, therapeutic activities  · Plan of Care Expires: 04/01/22  · Plan of Care Reviewed with: patient    Subjective     Chief Complaint: Pt unable to communicate during OT evaluation  Patient/Family Comments/goals: Pt unable to state    Occupational Profile:  Living Environment: Per chart, pt lived at home alone  Previous level of function: Per chart, pt was independent at baseline for self-care  Roles and Routines: Pt took care of himself and his home  Equipment Used at Home:  none  Assistance upon Discharge: Pt will require 24 hour total care    Pain/Comfort:  · Pain Rating 1: 0/10 (at rest, up to 5/10 or 6/10 with stretching of (B) UE)  · Location - Side 1: Bilateral  · Location 1: arm  · Pain Addressed 1: Reposition, Distraction, Cessation of Activity  · Pain Rating Post-Intervention 1: 0/10    Patients cultural, spiritual,  Jainism conflicts given the current situation: no    Objective:     Communicated with: STEVEN Godfrey prior to session.  Patient found HOB elevated with telemetry, peripheral IV, Tracheostomy, pulse ox (continuous), blood pressure cuff, CPAP upon OT entry to room.    General Precautions: Standard, NPO, respiratory, vision impaired (No sitting due to large sacral wound)   Orthopedic Precautions:    Braces: N/A  Respiratory Status: CPAP setting on vent via trach    Occupational Performance:    Bed Mobility:    · NT    Functional Mobility/Transfers:  · NT- pt unable    Activities of Daily Living:  · Grooming: total assistance to wash face    Cognitive/Visual Perceptual:  Cognitive/Psychosocial Skills:     -       Oriented to: Person   -       Follows Commands/attention:Inattentive  -       Communication: Pt occasionally moved head for yes/no question, but mostly did not attempt communication  -       Safety awareness/insight to disability: impaired   -       Mood/Affect/Coping skills/emotional control: Flat affect    Physical Exam:  Skin integrity: Thin  Edema:  None noted  Upper Extremity Range of Motion:     -       Right Upper Extremity: no AROM seen, PROM moderately impaired throughout with increased resistance/ muscle tone  -       Left Upper Extremity: no AROM seen. PROM moderately impaired with increased resistance/muscle tone  Fine Motor Coordination:    -       Impaired  no AROM noted  Gross motor coordination:   no AROM noted    AMPAC 6 Click ADL:  AMPAC Total Score: 6    Treatment & Education:  · Pt educated on OT role/POC.   · Importance of assisting with self-care activities   · All questions/concerns answered within OT scope of practice    Education:    Patient left HOB elevated with all lines intact and STVEEN Treadwell notified    GOALS:   Multidisciplinary Problems     Occupational Therapy Goals        Problem: Occupational Therapy    Goal Priority Disciplines Outcome Interventions   Occupational Therapy  Goal     OT, PT/OT Ongoing, Progressing    Description: Grooming:   Short Term Goal: Pt will perform grooming with mod(A) and full setup  Long Term Goal: Pt will perform grooming with min(A) and full setup    UE Dressing:   Short Term Goal: Pt will perform UE dressing with mod(A) and heavy cues  Long Term Goal: Pt will perform UE dressing with min(A)    Endurance Status:   Short Term Goal:pt to perform 15 min OT treatment with 3 or greater rest breaks  Long Term Goal: pt to perform 25 min min OT treat with 2 or less rest breaks    ROM Status: AROM is severely impaired, PROM is moderately impaired with heavy tone/ resistance  Short Term Goal: Pt will increase AAROM of (B) shoulders to 1/2 range  Long Term Goal: Pt will increase (B) UE AAROM to 2/3 range                         History:     Past Medical History:   Diagnosis Date    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    Hyperlipidemia     Hypertension        Past Surgical History:   Procedure Laterality Date    COLOSTOMY N/A 12/18/2021    Procedure: CREATION, COLOSTOMY;  Surgeon: Veronica Hui MD;  Location: [facility];  Service: General;  Laterality: N/A;    DEBRIDEMENT OF LOWER EXTREMITY Right 1/4/2022    Procedure: DEBRIDEMENT, LOWER EXTREMITY;  Surgeon: Harish Cazares MD;  Location: [facility] OR;  Service: General;  Laterality: Right;    ESOPHAGOGASTRODUODENOSCOPY W/ PEG N/A 3/9/2022    Procedure: EGD, WITH PEG TUBE INSERTION;  Surgeon: Harish Cazares MD;  Location: [facility] OR;  Service: General;  Laterality: N/A;    INCISION AND DRAINAGE OF ABSCESS N/A 12/13/2021    Procedure: INCISION AND DRAINAGE, ABSCESS PERINEAL REGION;  Surgeon: Harish Cazares MD;  Location: [facility] OR;  Service: General;  Laterality: N/A;  perirectal    LAPAROTOMY N/A 12/30/2021    Procedure: LAPAROTOMY, PELVIC WASHOUT;  Surgeon: Veronica Hui MD;  Location: [facility];  Service: General;  Laterality: N/A;    TRACHEOSTOMY N/A 1/4/2022    Procedure: CREATION,  TRACHEOSTOMY;  Surgeon: Harish Cazares MD;  Location[facility];  Service: General;  Laterality: N/A;       Time Tracking:     OT Date of Treatment: 03/26/22  OT Start Time: 1400  OT Stop Time: 1413  OT Total Time (min): 13 min    Billable Minutes:Evaluation low complexity evaluation    3/26/2022

## 2022-03-26 NOTE — PLAN OF CARE
Problem: Communication Impairment (Mechanical Ventilation, Invasive)  Goal: Effective Communication  Outcome: Ongoing, Progressing     Problem: Communication Impairment (Artificial Airway)  Goal: Effective Communication  Outcome: Ongoing, Progressing     Problem: Noninvasive Ventilation Acute  Goal: Effective Unassisted Ventilation and Oxygenation  Outcome: Ongoing, Progressing     Problem: Gas Exchange Impaired  Goal: Optimal Gas Exchange  Outcome: Ongoing, Progressing

## 2022-03-26 NOTE — ASSESSMENT & PLAN NOTE
- 12/14 intubated, tracheostomy 1/4  - continuous PSV/CPAP, had to be switched back to AC due to apnea spells  3/14- on cpap this am and doing well. Will try for continuous cpap again. Only place back on assist control if apnea causes drop in sat  3/15- doing well and no issues overnight. Hopefully can try some trach collar in the next couple of days  3/16- Had an episode earlier today that required patient to be placed back on ventilator. He was suctioned and had improvement  3/26-- increase PSV as tolerated

## 2022-03-26 NOTE — PLAN OF CARE
Problem: Infection Progression (Sepsis/Septic Shock)  Goal: Absence of Infection Signs and Symptoms  Outcome: Ongoing, Progressing     Problem: Fluid and Electrolyte Imbalance (Acute Kidney Injury/Impairment)  Goal: Fluid and Electrolyte Balance  Outcome: Ongoing, Progressing     Problem: Infection  Goal: Absence of Infection Signs and Symptoms  Outcome: Ongoing, Progressing

## 2022-03-26 NOTE — PROGRESS NOTES
[facility]  Pulmonology  Progress Note    Patient Name: Og Garcia  MRN: [id number]  Admission Date: 12/23/2021  Hospital Length of Stay: 93 days  Code Status: Prior  Attending Provider: Cecil Abernathy DO  Primary Care Provider: Hector Arndt DNP, FNP-C   Principal Problem: Denice gangrene    Subjective:     Interval History: tolerating PSV- awake, alert on exam, no complaints     Objective:     Vital Signs (Most Recent):  Temp: 99 °F (37.2 °C) (03/26/22 1100)  Pulse: 108 (03/26/22 1208)  Resp: (!) 25 (03/26/22 1208)  BP: 138/71 (03/26/22 1200)  SpO2: 100 % (03/26/22 1208)   Vital Signs (24h Range):  Temp:  [97.6 °F (36.4 °C)-99.8 °F (37.7 °C)] 99 °F (37.2 °C)  Pulse:  [] 108  Resp:  [14-31] 25  SpO2:  [96 %-100 %] 100 %  BP: (104-165)/(50-82) 138/71     Weight: 78.6 kg (173 lb 4.5 oz)  Body mass index is 24.17 kg/m².      Intake/Output Summary (Last 24 hours) at 3/26/2022 1418  Last data filed at 3/26/2022 0600  Gross per 24 hour   Intake 795 ml   Output 160 ml   Net 635 ml       Physical Exam  Vitals reviewed.   HENT:      Mouth/Throat:      Mouth: Mucous membranes are moist.   Eyes:      Pupils: Pupils are equal, round, and reactive to light.   Cardiovascular:      Rate and Rhythm: Normal rate and regular rhythm.   Pulmonary:      Effort: Pulmonary effort is normal.      Breath sounds: Normal breath sounds.   Abdominal:      Palpations: Abdomen is soft.   Musculoskeletal:         General: Normal range of motion.      Cervical back: Normal range of motion and neck supple.   Skin:     General: Skin is warm and dry.      Capillary Refill: Capillary refill takes less than 2 seconds.   Neurological:      Mental Status: He is alert.       Vents:  Vent Mode: PS/CPAP (CATIE TC WELL FOR 3HRS) (03/26/22 1208)  Set Rate: 14 BPM (03/22/22 0258)  Vt Set: 480 mL (03/22/22 0258)  Pressure Support: 12 cmH20 (03/26/22 1208)  PEEP/CPAP: 5 cmH20 (03/26/22 1208)  Oxygen Concentration (%):  35 (03/26/22 0900)  Peak Airway Pressure: 18 cmH2O (03/26/22 1208)  Plateau Pressure: 20 cmH20 (03/07/22 0500)  Total Ve: 7.4 mL (03/26/22 1208)  F/VT Ratio<105 (RSBI): (!) 58.82 (03/26/22 1208)    Lines/Drains/Airways       Peripherally Inserted Central Catheter Line  Duration             PICC Double Lumen 01/05/22 left basilic 80 days              Central Venous Catheter Line  Duration                  Hemodialysis Catheter 12/30/21 0900 right internal jugular 86 days              Drain  Duration                  Colostomy 12/18/21 1030 Descending/sigmoid LUQ 98 days         Gastrostomy/Enterostomy 03/09/22 1436 Percutaneous endoscopic gastrostomy (PEG) midline feeding 16 days              Airway  Duration                  Surgical Airway 01/04/22 Shiley 81 days                    Significant Labs:    CBC/Anemia Profile:  No results for input(s): WBC, HGB, HCT, PLT, MCV, RDW, IRON, FERRITIN, RETIC, FOLATE, [id number], OCCULTBLOOD in the last 48 hours.     Chemistries:  No results for input(s): NA, K, CL, CO2, BUN, CREATININE, CALCIUM, ALBUMIN, PROT, BILITOT, ALKPHOS, ALT, AST, GLUCOSE, MG, PHOS in the last 48 hours.    All pertinent labs within the past 24 hours have been reviewed.    Significant Imaging:  I have reviewed all pertinent imaging results/findings within the past 24 hours.    Assessment/Plan:     ESRD (end stage renal disease)  Started on HD 12/30   Continue with HD per nephrology      Type 2 diabetes mellitus without complication, without long-term current use of insulin  Fasting 185       On mechanically assisted ventilation  - 12/14 intubated, tracheostomy 1/4  - continuous PSV/CPAP, had to be switched back to AC due to apnea spells  3/14- on cpap this am and doing well. Will try for continuous cpap again. Only place back on assist control if apnea causes drop in sat  3/15- doing well and no issues overnight. Hopefully can try some trach collar in the next couple of days  3/16- Had an episode  earlier today that required patient to be placed back on ventilator. He was suctioned and had improvement  3/26-- increase PSV as tolerated                  FLORESITA Shafer-ACNP  Pulmonology  [facility]

## 2022-03-26 NOTE — PROGRESS NOTES
Screen only. PT evaluation order received. 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 Patient opens eyes and visually tracks but does not follow commands for movement/LE exercises. Increased tone/resistance noted R LE with attempted ROM. Patient is not a candidate for skilled PT. Recommend rotation off sacral wound prior to elevating HOB for more upright positioning as desired. Patient will require 24/7 care at discharge.

## 2022-03-26 NOTE — NURSING
1735: Patient vomiting mucus. Zofran 8mg given.    1820: Patient more relaxed no signs of nausea noted. Zofran successful

## 2022-03-26 NOTE — NURSING
Patient RR increased to 44 on trach collar. Patient changed back to assist control  At this time HR 34.

## 2022-03-26 NOTE — PLAN OF CARE
Problem: Occupational Therapy  Goal: Occupational Therapy Goal  Description: Grooming:   Short Term Goal: Pt will perform grooming with mod(A) and full setup  Long Term Goal: Pt will perform grooming with min(A) and full setup    UE Dressing:   Short Term Goal: Pt will perform UE dressing with mod(A) and heavy cues  Long Term Goal: Pt will perform UE dressing with min(A)    Endurance Status:   Short Term Goal:pt to perform 15 min OT treatment with 3 or greater rest breaks  Long Term Goal: pt to perform 25 min min OT treat with 2 or less rest breaks    ROM Status: AROM is severely impaired, PROM is moderately impaired with heavy tone/ resistance  Short Term Goal: Pt will increase AAROM of (B) shoulders to 1/2 range  Long Term Goal: Pt will increase (B) UE AAROM to 2/3 range        3/26/2022 1719 by Deann Fonseca, OT  Outcome: Ongoing, Progressing    OT low complexity evaluation was performed. Pt with minimal interaction during eval, heavy tone/ resistance seen with (B) UE ROM. OT will offer 1 week trial to determine pt's rehab potential.

## 2022-03-26 NOTE — PROGRESS NOTES
Patient opens his eyes and fixes and follows but has minimal interaction beyond this  Physical exam general the patient is chronically ill-appearing, heart is regular rate and rhythm, he has no pitting edema, lungs are clear to auscultation anteriorly, abdomen is soft with positive bowel sounds, he has ostomy in place and has a sutured out abdominal wall in his right lower quadrant area    Assessment/plan 1.  ESRD-continue HD support  2. Denice's gangrene  Three.  Secondary hyperparathyroidism continue Renvela  4. Diabetes mellitus-continue insulin therapy  4. Hypertension-continue present antihypertensives  5. Respiratory failure-continue trach care   6. Anemia-patient's hematocrit was 24% on 03/18/2022

## 2022-03-27 NOTE — PT/OT/SLP PROGRESS
Occupational Therapy   Treatment    Name: Og Garcia  MRN: [id number]  Admitting Diagnosis:  Denice gangrene       Recommendations:     Discharge Recommendations: nursing facility, skilled, hospice facility, intermediate care facility/nursing home  Discharge Equipment Recommendations:   (to be determined)  Barriers to discharge:   (on going medical treatment)    Assessment:     Og Garcia is a 66 y.o. male with a medical diagnosis of Denice gangrene.  He presents with alert, makes eye contact with therapist, but does not attempt any communication. Performance deficits affecting function are weakness, impaired cardiopulmonary response to activity, impaired cognition, impaired endurance, impaired functional mobilty, impaired joint extensibility, impaired fine motor, impaired coordination, impaired self care skills, impaired skin, decreased lower extremity function, decreased ROM, decreased upper extremity function.     Rehab Prognosis:  Fair; patient would benefit from acute skilled OT services to address these deficits and reach maximum level of function.       Plan:     Patient to be seen 5 x/week to address the above listed problems via self-care/home management, therapeutic activities, therapeutic exercises  · Plan of Care Expires: 04/01/22  · Plan of Care Reviewed with: patient    Subjective     Pain/Comfort:  · Pain Rating 1: 0/10 (up to 4/10 with stretching (B) UEs)  · Location - Side 1: Bilateral  · Location 1: arm (and hands)  · Pain Addressed 1: Reposition, Distraction, Cessation of Activity  · Pain Rating Post-Intervention 1: 0/10    Objective:     Communicated with: STEVEN Godfrey prior to session.  Patient found HOB elevated with telemetry, Tracheostomy, peripheral IV, blood pressure cuff, pulse ox (continuous), oxygen upon OT entry to room.    General Precautions: Standard, NPO, respiratory, vision impaired (no sitting due to severe sacral wound)   Orthopedic Precautions:    Braces:  N/A  Respiratory Status: Vent via Tracheostomy     Occupational Performance:     Bed Mobility:    · NT    Functional Mobility/Transfers:  · NT    Activities of Daily Living:  · Pt unable to assist with grooming. Pt is too resistive and restrictive with UE movement to be hand over hand guided to perform grooming tasks.      [facility] 6 Click ADL: 6    Treatment & Education:  Pt is initially resistive to movement, but occasionally relaxes and possibly assists a little with ROM. ROM is performed very slowly , starting in very small range and gradually increasing to avoid spasticity and pain. PT continues to demonstrate greater tone on (R) than on (L). (B) ROM to shoulders, elbows, forearms, wrists and hands performed for 15 to 20 reps with cues. (B) hands were positioned with rolled pillow case to keep fingers more extended and to prevent fingernails from cutting his palms.    Patient left HOB elevated with all lines intact, call button in reach and RN Jeovany notifiedEducation:      GOALS:   Multidisciplinary Problems     Occupational Therapy Goals        Problem: Occupational Therapy    Goal Priority Disciplines Outcome Interventions   Occupational Therapy Goal     OT, PT/OT Ongoing, Progressing    Description: Grooming:   Short Term Goal: Pt will perform grooming with mod(A) and full setup  Long Term Goal: Pt will perform grooming with min(A) and full setup    UE Dressing:   Short Term Goal: Pt will perform UE dressing with mod(A) and heavy cues  Long Term Goal: Pt will perform UE dressing with min(A)    Endurance Status:   Short Term Goal:pt to perform 15 min OT treatment with 3 or greater rest breaks  Long Term Goal: pt to perform 25 min min OT treat with 2 or less rest breaks    ROM Status: AROM is severely impaired, PROM is moderately impaired with heavy tone/ resistance  Short Term Goal: Pt will increase AAROM of (B) shoulders to 1/2 range  Long Term Goal: Pt will increase (B) UE AAROM to 2/3 range                          Time Tracking:     OT Date of Treatment: 03/27/22  OT Start Time: 1529  OT Stop Time: 1541  OT Total Time (min): 12 min    Billable Minutes:Therapeutic Exercise 12 min    OT/PAKO: OT          3/27/2022

## 2022-03-27 NOTE — SUBJECTIVE & OBJECTIVE
Interval History: awake, tolerating trach collar     Objective:     Vital Signs (Most Recent):  Temp: 98 °F (36.7 °C) (03/27/22 1100)  Pulse: 85 (03/27/22 1400)  Resp: (!) 25 (03/27/22 1400)  BP: (!) 144/60 (03/27/22 1400)  SpO2: (!) 94 % (03/27/22 1400)   Vital Signs (24h Range):  Temp:  [98 °F (36.7 °C)-98.9 °F (37.2 °C)] 98 °F (36.7 °C)  Pulse:  [] 85  Resp:  [12-30] 25  SpO2:  [93 %-100 %] 94 %  BP: (118-179)/(55-85) 144/60     Weight: 78.6 kg (173 lb 4.5 oz)  Body mass index is 24.17 kg/m².      Intake/Output Summary (Last 24 hours) at 3/27/2022 1415  Last data filed at 3/26/2022 1800  Gross per 24 hour   Intake 800 ml   Output 100 ml   Net 700 ml       Physical Exam  Vitals reviewed.   HENT:      Right Ear: External ear normal.      Left Ear: External ear normal.      Mouth/Throat:      Mouth: Mucous membranes are moist.   Eyes:      Pupils: Pupils are equal, round, and reactive to light.   Cardiovascular:      Rate and Rhythm: Normal rate and regular rhythm.   Pulmonary:      Effort: Pulmonary effort is normal.      Breath sounds: Normal breath sounds.   Abdominal:      Palpations: Abdomen is soft.   Musculoskeletal:         General: Normal range of motion.      Cervical back: Normal range of motion and neck supple.   Skin:     General: Skin is warm and dry.      Capillary Refill: Capillary refill takes less than 2 seconds.   Neurological:      Mental Status: He is alert. Mental status is at baseline.   Psychiatric:         Mood and Affect: Mood normal.       Vents:  Vent Mode: PS/CPAP (03/27/22 0754)  Set Rate: 14 BPM (03/27/22 0320)  Vt Set: 48 mL (03/27/22 0320)  Pressure Support: 12 cmH20 (03/27/22 0754)  PEEP/CPAP: 5 cmH20 (03/27/22 0754)  Oxygen Concentration (%): 35 (03/27/22 1040)  Peak Airway Pressure: 18 cmH2O (03/27/22 0754)  Plateau Pressure: 20 cmH20 (03/07/22 0500)  Total Ve: 6.8 mL (03/27/22 0754)  F/VT Ratio<105 (RSBI): (!) 41.84 (03/27/22 0754)    Lines/Drains/Airways       Peripherally  Inserted Central Catheter Line  Duration             PICC Double Lumen 01/05/22 left basilic 81 days              Central Venous Catheter Line  Duration                  Hemodialysis Catheter 12/30/21 0900 right internal jugular 87 days              Drain  Duration                  Colostomy 12/18/21 1030 Descending/sigmoid LUQ 99 days         Gastrostomy/Enterostomy 03/09/22 1436 Percutaneous endoscopic gastrostomy (PEG) midline feeding 17 days              Airway  Duration                  Surgical Airway 01/04/22 Shiley 82 days                    Significant Labs:    CBC/Anemia Profile:  No results for input(s): WBC, HGB, HCT, PLT, MCV, RDW, IRON, FERRITIN, RETIC, FOLATE, [id number], OCCULTBLOOD in the last 48 hours.     Chemistries:  No results for input(s): NA, K, CL, CO2, BUN, CREATININE, CALCIUM, ALBUMIN, PROT, BILITOT, ALKPHOS, ALT, AST, GLUCOSE, MG, PHOS in the last 48 hours.    All pertinent labs within the past 24 hours have been reviewed.    Significant Imaging:  I have reviewed all pertinent imaging results/findings within the past 24 hours.

## 2022-03-27 NOTE — PROGRESS NOTES
[facility]  Pulmonology  Progress Note    Patient Name: Og Garcia  MRN: [id number]  Admission Date: 12/23/2021  Hospital Length of Stay: 94 days  Code Status: Prior  Attending Provider: Cecil Abernathy DO  Primary Care Provider: Hector Arndt DNP, FNP-C   Principal Problem: Denice gangrene    Subjective:     Interval History: awake, tolerating trach collar     Objective:     Vital Signs (Most Recent):  Temp: 98 °F (36.7 °C) (03/27/22 1100)  Pulse: 85 (03/27/22 1400)  Resp: (!) 25 (03/27/22 1400)  BP: (!) 144/60 (03/27/22 1400)  SpO2: (!) 94 % (03/27/22 1400)   Vital Signs (24h Range):  Temp:  [98 °F (36.7 °C)-98.9 °F (37.2 °C)] 98 °F (36.7 °C)  Pulse:  [] 85  Resp:  [12-30] 25  SpO2:  [93 %-100 %] 94 %  BP: (118-179)/(55-85) 144/60     Weight: 78.6 kg (173 lb 4.5 oz)  Body mass index is 24.17 kg/m².      Intake/Output Summary (Last 24 hours) at 3/27/2022 1415  Last data filed at 3/26/2022 1800  Gross per 24 hour   Intake 800 ml   Output 100 ml   Net 700 ml       Physical Exam  Vitals reviewed.   HENT:      Right Ear: External ear normal.      Left Ear: External ear normal.      Mouth/Throat:      Mouth: Mucous membranes are moist.   Eyes:      Pupils: Pupils are equal, round, and reactive to light.   Cardiovascular:      Rate and Rhythm: Normal rate and regular rhythm.   Pulmonary:      Effort: Pulmonary effort is normal.      Breath sounds: Normal breath sounds.   Abdominal:      Palpations: Abdomen is soft.   Musculoskeletal:         General: Normal range of motion.      Cervical back: Normal range of motion and neck supple.   Skin:     General: Skin is warm and dry.      Capillary Refill: Capillary refill takes less than 2 seconds.   Neurological:      Mental Status: He is alert. Mental status is at baseline.   Psychiatric:         Mood and Affect: Mood normal.       Vents:  Vent Mode: PS/CPAP (03/27/22 0754)  Set Rate: 14 BPM (03/27/22 0320)  Vt Set: 48 mL (03/27/22  0320)  Pressure Support: 12 cmH20 (03/27/22 0754)  PEEP/CPAP: 5 cmH20 (03/27/22 0754)  Oxygen Concentration (%): 35 (03/27/22 1040)  Peak Airway Pressure: 18 cmH2O (03/27/22 0754)  Plateau Pressure: 20 cmH20 (03/07/22 0500)  Total Ve: 6.8 mL (03/27/22 0754)  F/VT Ratio<105 (RSBI): (!) 41.84 (03/27/22 0754)    Lines/Drains/Airways       Peripherally Inserted Central Catheter Line  Duration             PICC Double Lumen 01/05/22 left basilic 81 days              Central Venous Catheter Line  Duration                  Hemodialysis Catheter 12/30/21 0900 right internal jugular 87 days              Drain  Duration                  Colostomy 12/18/21 1030 Descending/sigmoid LUQ 99 days         Gastrostomy/Enterostomy 03/09/22 1436 Percutaneous endoscopic gastrostomy (PEG) midline feeding 17 days              Airway  Duration                  Surgical Airway 01/04/22 Shiley 82 days                    Significant Labs:    CBC/Anemia Profile:  No results for input(s): WBC, HGB, HCT, PLT, MCV, RDW, IRON, FERRITIN, RETIC, FOLATE, [id number], OCCULTBLOOD in the last 48 hours.     Chemistries:  No results for input(s): NA, K, CL, CO2, BUN, CREATININE, CALCIUM, ALBUMIN, PROT, BILITOT, ALKPHOS, ALT, AST, GLUCOSE, MG, PHOS in the last 48 hours.    All pertinent labs within the past 24 hours have been reviewed.    Significant Imaging:  I have reviewed all pertinent imaging results/findings within the past 24 hours.    Assessment/Plan:     * Denice gangrene  - s/p multiple debridements  - wound vac in place  - ID consulted for antibiotic management: He was treated with rocephin, daptomycin, clindamycin. 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 Cazares  2/21- back to OR for skin grafting  3/9- had peg tube placed   03/23/2022 no changes    ESRD (end stage renal disease)  Started on HD 12/30   Continue with HD per nephrology      Type 2 diabetes mellitus without complication, without long-term current use of insulin  Fasting 174      On mechanically assisted ventilation  - 12/14 intubated, tracheostomy 1/4    3/28-- increase trach collar to 8 hours BID as tolerated                  Elinor Baker AG-ACNP  Pulmonology  [facility]

## 2022-03-27 NOTE — PROGRESS NOTES
Patient opens his eyes and fixes and follows but has minimal interaction beyond this    Physical exam general the patient is chronically ill-appearing, heart is regular rate and rhythm, he has no pitting edema, lungs are clear to auscultation anteriorly, abdomen is soft with positive bowel sounds, he has ostomy in place and has a sutured out abdominal wall in his right lower quadrant area    Assessment/plan 1.  ESRD-continue HD support  2. Denice's gangrene  Three.  Secondary hyperparathyroidism continue Renvela  4. Diabetes mellitus-continue insulin therapy, his glucose around lunch was 159  4. Hypertension-continue present antihypertensives  5. Respiratory failure-continue trach care   6. Anemia-patient's hematocrit was 24% on 03/18/2022

## 2022-03-28 NOTE — NURSING
Received patient in bed no acute distress noted. Safety measures intact, cb near, will continue to monitor.

## 2022-03-28 NOTE — NURSING
Resting in bed with CPAP in use.  Requires in-line tracheal suctioning.  O2 Sat 100% at present.  No cyanosis noted.  HOB elevated.

## 2022-03-28 NOTE — PROGRESS NOTES
[facility]  Adult Nutrition  Follow-up Note         Reason for Assessment  Reason For Assessment: RD follow-up  Nutrition Risk Screen: tube feeding or parenteral nutrition  Malnutrition  Is Patient Malnourished: No  Nutrition Diagnosis  Increased protein Needs   related to Decreased/ impaired skin integrity as evidenced by wounds    Nutrition Diagnosis Status: Chronic/ continues      Nutrition Risk  Level of Risk/Frequency of Follow-up: high   Chewing or Swallowing Difficulty?: Swallowing difficulty  Estimated/Assessed Needs  RMR (Branch-St. Jeor Equation): 1555.13 Activity Factor: 1 Injury Factor: 1.2   Total Ve: 9.7 mL Temp: 98.6 °F (37 °C)Axillary  Weight Used For Calorie Calculations: 75.3 kg (166 lb 0.1 oz)   Energy Need Method: [place] [employer] Calorie Requirements (kcal): 2105  Weight Used For Protein Calculations: 83.3 kg (183 lb 10.3 oz)  Protein Requirements: 100-125  Estimated Fluid Requirement Method: RDA Method Fluid Requirements (mL): 2105  RDA Method (mL): 2105     Nutrition Prescription / Recommendations  Recommendation/Intervention: PEG tube feeding: Nepro @ 60ml/hr; H20 flush of 100ml q 4hr  Goals: pt will tolerate tube feeding; wound healing; pt will meet estimated nutritional needs; wt maintenance  Nutrition Goal Status: progressing towards goal  Communication of RD Recs: reviewed with RN  Current Diet Order: NPO? PEG tube feeding  Nutrition Order Comments: PEG tube feeding: Nepro @ 60ml/hr; H20 flush of 100ml q 4hr  Current Nutrition Support Formula Ordered: Nepro  Current Nutrition Support Rate Ordered: 60 (ml)  Current Nutrition Support Frequency Ordered: hourly  Recommended Diet: Enteral Nutrition PEG tube feeding: Nepro @ 60ml/hr; H20 flush of 100ml q 4hr  Recommended Oral Supplement: No Oral Supplements  Is Nutrition Support Recommended: Yes   Needs Calculated  Energy Need Method: [place] [employer] Calorie Requirements (kcal): 2105  Protein Requirements:  100-125  Enteral Nutrition meets needs?: yes  Enteral Nutrition Status: Continue Enteral Nutrition    Is Education Recommended: No  Monitor and Evaluation  % current Intake: Enteral Nutrition progressing to goal  % intake to meet estimated needs: Enteral Nutrition   Food and Nutrient Intake: enteral nutrition intake  Food and Nutrient Adminstration: enteral and parenteral nutrition administration  Anthropometric Measurements: height/length, body mass index, weight, weight change  Biochemical Data, Medical Tests and Procedures: electrolyte and renal panel, glucose/endocrine profile  Nutrition-Focused Physical Findings: skin  Enteral Calories (kcal): 2592  Enteral Protein (gm): 115  Enteral (Free Water) Fluid (mL): 1037  Free Water Flush Fluid (mL): 600  Parenteral Calories (kcal): 845  Parenteral Protein (gm): 48  Parenteral Fluid (mL): 960  Lipid Calories (kcals): 500 kcals  Other Calories (kcal): 528 (propofol)  Total Calories (kcal): 2160  Total Calories (kcal/kg): 2612  % Kcal Needs: 100  Total Protein (gm): 135  % Protein Needs: 100  IV Fluid (mL): 1764  Total Fluid Intake (mL): 1637  Energy Calories Required: meeting needs  Protein Required: meeting needs  Fluid Required: meeting needs  Tolerance: tolerating  Current Medical Diagnosis and Past Medical History  Diagnosis: gastrointestinal disease, infection/sepsis  Past Medical History:   Diagnosis Date    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  Last A1c: No results found for: HGBA1C  Lab Results   Component Value Date    RBC 2.10 (L) 03/18/2022    HGB 8.0 (L) 03/18/2022    HCT 24.3 (L) 03/18/2022     "MCV 91.0 03/18/2022    MCH 29.5 03/18/2022    MCHC 32.5 03/18/2022     Pertinent Labs Reviewed: reviewed  Pertinent Labs Comments: Hct 24.3, , BUN 96, Cr 4.32, Glu 300,  Pertinent Medications Reviewed: reviewed  Pertinent Medications Comments: heparin, insulin  Anthropometrics  Temp: 98.6 °F (37 °C)  Height Method: Stated  Height: 5' 11" (180.3 cm)  Height (inches): 71 in  Weight Method: Bed Scale  Weight: 75.3 kg (166 lb 0.1 oz)  Weight (lb): 166.01 lb  Ideal Body Weight (IBW), Male: 172 lb  % Ideal Body Weight, Male (lb): 106.77 %  BMI (Calculated): 23.2  BMI Grade: 25 - 29.9 - overweight     Nutrition by Nursing  Diet/Nutrition Received: (P) tube feeding  Intake (%): 100%  Diet/Feeding Assistance: total feed  Diet/Feeding Tolerance:  (tube feeding)  Last Bowel Movement: 03/27/22  [REMOVED]      NG/OG Tube [place] sump 18 Fr. Left nostril-Feeding Type: continuous, by pump       Gastrostomy/Enterostomy 03/09/22 1436 Percutaneous endoscopic gastrostomy (PEG) midline feeding-Feeding Type: continuous, by pump  [REMOVED]      NG/OG Tube [place] sump 18 Fr. Left nostril-Current Rate (mL/hr): 50 mL/hr       Gastrostomy/Enterostomy 03/09/22 1436 Percutaneous endoscopic gastrostomy (PEG) midline feeding-Current Rate (mL/hr): (P) 55 mL/hr  [REMOVED]      NG/OG Tube [place] sump 18 Fr. Left nostril-Goal Rate (mL/hr): 75 mL/hr       Gastrostomy/Enterostomy 03/09/22 1436 Percutaneous endoscopic gastrostomy (PEG) midline feeding-Goal Rate (mL/hr): (P) 55 mL/hr  [REMOVED]      NG/OG Tube [place] sump 18 Fr. Left nostril-Formula Name: nepro 1.8       Gastrostomy/Enterostomy 03/09/22 1436 Percutaneous endoscopic gastrostomy (PEG) midline feeding-Formula Name: Nepro 1.8  Nutrition Follow-Up  RD Follow-up?: Yes  Assessment and Plan  No new Assessment & Plan notes have been filed under this hospital service since the last note was generated.  Service: Nutrition     "

## 2022-03-28 NOTE — PROGRESS NOTES
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 male with wounds to abdomen, sacral, left lateral lower leg, right anterior foot, right hand, Left posterior heel, and right lateral and medial foot. Skin graft on abdomen intact and healed. He was taken to OR on Friday for debridement of sacral wound, will change from vashe to silvadene. Wounds to lower extremities continue to have dry eschar, no improvement noted with silvadene.     Review of Systems   Unable to perform ROS: Acuity of condition     Objective:     Vital Signs (Most Recent):  Temp: 98.7 °F (37.1 °C) (03/28/22 0400)  Pulse: 103 (03/28/22 0750)  Resp: (!) 36 (03/28/22 0750)  BP: (!) 150/67 (03/28/22 0600)  SpO2: 100 % (03/28/22 0750) Vital Signs (24h Range):  Temp:  [98 °F (36.7 °C)-99.5 °F (37.5 °C)] 98.7 °F (37.1 °C)  Pulse:  [] 103  Resp:  [18-42] 36  SpO2:  [88 %-100 %] 100 %  BP: (116-179)/(50-85) 150/67     Weight: 75.3 kg (166 lb 0.1 oz)  Body mass index is 23.15 kg/m².    Physical Exam  Vitals reviewed.   Constitutional:       Appearance: He is ill-appearing.   HENT:      Head: Normocephalic.   Cardiovascular:      Rate and Rhythm: Normal rate and regular rhythm.      Pulses: Normal pulses.      Heart sounds: Normal heart sounds.   Pulmonary:      Comments: Intubated  Skin:     Findings: Erythema present.      Comments: Wound, see wound assessment and pictures   Neurological:      Mental Status: He is alert. Mental status is at baseline.      Motor: Weakness present.         Assessment and  Plan      Assessment:  1. Abdominal wound/Skin graft  2. Right thigh donor site  3. Sacral wound  4.  Lower extremity wounds    Plan:   1. Abdomen-keep clean and dry  2.Silvedene to sacral  3. Betadine to eschar  4. Turn every 2 hours, off-loading, keep pressure off sacral and heels.          Signature:  HERSON Viveros  Wound Care    Date of encounter: 3/28/2022

## 2022-03-28 NOTE — SUBJECTIVE & OBJECTIVE
Interval History: No acute events overnight. The patient is currently resting comfortably. He is doing well with trach collar. He is currently afebrile and vital signs are stable.    Objective:     Vital Signs (Most Recent):  Temp: 98.6 °F (37 °C) (03/28/22 1222)  Pulse: 105 (03/28/22 1415)  Resp: (!) 36 (03/28/22 1415)  BP: (!) 142/70 (03/28/22 1403)  SpO2: 97 % (03/28/22 1415)   Vital Signs (24h Range):  Temp:  [98.6 °F (37 °C)-99.5 °F (37.5 °C)] 98.6 °F (37 °C)  Pulse:  [] 105  Resp:  [18-50] 36  SpO2:  [88 %-100 %] 97 %  BP: (116-161)/(50-78) 142/70     Weight: 75.3 kg (166 lb 0.1 oz)  Body mass index is 23.15 kg/m².      Intake/Output Summary (Last 24 hours) at 3/28/2022 1652  Last data filed at 3/28/2022 1222  Gross per 24 hour   Intake 810 ml   Output 2500 ml   Net -1690 ml         Physical Exam  Vitals reviewed.   HENT:      Right Ear: External ear normal.      Left Ear: External ear normal.      Mouth/Throat:      Mouth: Mucous membranes are moist.   Eyes:      Pupils: Pupils are equal, round, and reactive to light.   Cardiovascular:      Rate and Rhythm: Normal rate and regular rhythm.   Pulmonary:      Effort: Pulmonary effort is normal.      Breath sounds: Normal breath sounds.   Abdominal:      Palpations: Abdomen is soft.   Musculoskeletal:         General: Normal range of motion.      Cervical back: Normal range of motion and neck supple.   Skin:     General: Skin is warm and dry.      Capillary Refill: Capillary refill takes less than 2 seconds.   Neurological:      Mental Status: He is alert. Mental status is at baseline.   Psychiatric:         Mood and Affect: Mood normal.       Vents:  Vent Mode: PS/CPAP (03/28/22 1415)  Set Rate: 14 BPM (03/28/22 0050)  Vt Set: 480 mL (03/28/22 0050)  Pressure Support: 12 cmH20 (03/28/22 1415)  PEEP/CPAP: 5 cmH20 (03/28/22 1415)  Oxygen Concentration (%): 35 (03/28/22 1415)  Peak Airway Pressure: 21 cmH2O (03/28/22 1415)  Plateau Pressure: 20 cmH20  (03/07/22 0500)  Total Ve: 9.7 mL (03/28/22 1415)  F/VT Ratio<105 (RSBI): 122.87 (03/28/22 1415)    Lines/Drains/Airways       Peripherally Inserted Central Catheter Line  Duration             PICC Double Lumen 01/05/22 left basilic 82 days              Central Venous Catheter Line  Duration                  Hemodialysis Catheter 12/30/21 0900 right internal jugular 88 days              Drain  Duration                  Colostomy 12/18/21 1030 Descending/sigmoid  days         Gastrostomy/Enterostomy 03/09/22 1436 Percutaneous endoscopic gastrostomy (PEG) midline feeding 19 days              Airway  Duration                  Surgical Airway 01/04/22 [place] 83 days                    Significant Labs:    CBC/Anemia Profile:  No results for input(s): WBC, HGB, HCT, PLT, MCV, RDW, IRON, FERRITIN, RETIC, FOLATE, [id number], OCCULTBLOOD in the last 48 hours.     Chemistries:  No results for input(s): NA, K, CL, CO2, BUN, CREATININE, CALCIUM, ALBUMIN, PROT, BILITOT, ALKPHOS, ALT, AST, GLUCOSE, MG, PHOS in the last 48 hours.    All pertinent labs within the past 24 hours have been reviewed.    Significant Imaging:  I have reviewed all pertinent imaging results/findings within the past 24 hours.

## 2022-03-28 NOTE — PLAN OF CARE
Problem: Occupational Therapy  Goal: Occupational Therapy Goal  Description: Grooming:   Short Term Goal: Pt will perform grooming with mod(A) and full setup  Long Term Goal: Pt will perform grooming with min(A) and full setup    UE Dressing:   Short Term Goal: Pt will perform UE dressing with mod(A) and heavy cues  Long Term Goal: Pt will perform UE dressing with min(A)    Endurance Status:   Short Term Goal:pt to perform 15 min OT treatment with 3 or greater rest breaks  Long Term Goal: pt to perform 25 min min OT treat with 2 or less rest breaks    ROM Status: AROM is severely impaired, PROM is moderately impaired with heavy tone/ resistance  Short Term Goal: Pt will increase AAROM of (B) shoulders to 1/2 range  Long Term Goal: Pt will increase (B) UE AAROM to 2/3 range        Outcome: Ongoing, Progressing   Pt recently evaled on 3/26/2022 and is on a 1 week trail

## 2022-03-28 NOTE — RESPIRATORY THERAPY
0515 03/28/2022 Patient placed on Trach collar trial at 35%   Hr 109 rr 33 spo2 100% bp 139/64  0615 0328/2022 patient continues on trach collar trial sat 100% hr 108 rr 33 bp 150/67   Tolerating well will continue to monitor report to be given to oncoming shift

## 2022-03-28 NOTE — PLAN OF CARE
Patient is on going progressing will continue to monitor.   Problem: Adult Inpatient Plan of Care  Goal: Plan of Care Review  Outcome: Ongoing, Progressing  Flowsheets (Taken 3/28/2022 0453)  Plan of Care Reviewed With: patient  Goal: Patient-Specific Goal (Individualized)  Outcome: Ongoing, Progressing  Goal: Absence of Hospital-Acquired Illness or Injury  Outcome: Ongoing, Progressing     Problem: Fluid and Electrolyte Imbalance (Acute Kidney Injury/Impairment)  Goal: Fluid and Electrolyte Balance  Outcome: Ongoing, Progressing  Intervention: Monitor and Manage Fluid and Electrolyte Balance  Flowsheets (Taken 3/28/2022 0453)  Fluid/Electrolyte Management: fluids provided     Problem: Communication Impairment (Mechanical Ventilation, Invasive)  Goal: Effective Communication  Outcome: Ongoing, Progressing  Intervention: Ensure Effective Communication  Flowsheets (Taken 3/28/2022 0453)  Communication Enhancement Strategies: nonverbal strategies used     Problem: Skin and Tissue Injury (Mechanical Ventilation, Invasive)  Goal: Absence of Device-Related Skin and Tissue Injury  Outcome: Ongoing, Progressing  Intervention: Maintain Skin and Tissue Health  Flowsheets (Taken 3/28/2022 0453)  Device Skin Pressure Protection:   absorbent pad utilized/changed   skin-to-skin areas padded   skin-to-device areas padded   pressure points protected

## 2022-03-28 NOTE — SUBJECTIVE & OBJECTIVE
Interval History: The patient is doing acceptable.  He tolerated dialysis on today.    Review of patient's allergies indicates:  No Known Allergies  Current Facility-Administered Medications   Medication Frequency    0.9%  NaCl infusion PRN    acetaminophen tablet 500 mg Q6H PRN    acetic acid 0.25 % irrigation PRN    albuterol nebulizer solution 2.5 mg Q4H PRN    albuterol-ipratropium 2.5 mg-0.5 mg/3 mL nebulizer solution 3 mL Q6H    bisacodyL EC tablet 10 mg Daily PRN    calcium gluconate 1 g in dextrose 5 % in water (D5W) 5 % 100 mL IVPB (MB+) Once    dextrose 50% injection 12.5 g PRN    diltiaZEM tablet 90 mg Q6H    fentaNYL 25 mcg/hr 1 patch Q72H    glucagon (human recombinant) injection 1 mg PRN    guaiFENesin 100 mg/5 ml syrup 200 mg Q6H    heparin (porcine) injection 4,000 Units PRN    heparin (porcine) injection 5,000 Units Q8H    hydrALAZINE tablet 25 mg Q8H    insulin aspart U-100 injection 1-10 Units Q6H    insulin detemir U-100 injection 25 Units QHS    ondansetron injection 8 mg Q6H PRN    pantoprazole suspension 40 mg Daily    predniSONE tablet 10 mg Daily    sevelamer carbonate pwpk 0.8 g TID WM    silver sulfADIAZINE 1% cream Daily PRN    simethicone chewable tablet 80 mg TID PRN       Objective:     Vital Signs (Most Recent):  Temp: 98.6 °F (37 °C) (03/28/22 1222)  Pulse: 105 (03/28/22 1415)  Resp: (!) 36 (03/28/22 1415)  BP: (!) 142/70 (03/28/22 1403)  SpO2: 97 % (03/28/22 1415)  O2 Device (Oxygen Therapy): ventilator (03/28/22 1415)   Vital Signs (24h Range):  Temp:  [98.6 °F (37 °C)-99.5 °F (37.5 °C)] 98.6 °F (37 °C)  Pulse:  [] 105  Resp:  [18-50] 36  SpO2:  [88 %-100 %] 97 %  BP: (116-161)/(50-78) 142/70     Weight: 75.3 kg (166 lb 0.1 oz) (03/28/22 0615)  Body mass index is 23.15 kg/m².  Body surface area is 1.94 meters squared.    I/O last 3 completed shifts:  In: 660 [NG/GT:660]  Out: -     Physical Exam  Vitals reviewed.   HENT:      Head: Atraumatic.   Cardiovascular:      Rate and  Rhythm: Regular rhythm.   Pulmonary:      Breath sounds: Normal breath sounds.      Comments: Ventilated.  Abdominal:      Palpations: Abdomen is soft.   Neurological:      Mental Status: He is alert.       Significant Labs:  BMP: No results for input(s): GLU, NA, K, CL, CO2, BUN, CREATININE, CALCIUM, MG in the last 168 hours.  CBC: No results for input(s): WBC, RBC, HGB, HCT, PLT, MCV, MCH, MCHC in the last 168 hours.     Significant Imaging:  Labs: Reviewed

## 2022-03-28 NOTE — PROGRESS NOTES
[facility]  Nephrology  Progress Note    Patient Name: Og Garcia  MRN: [id number]  Admission Date: 12/23/2021  Hospital Length of Stay: 95 days  Attending Provider: Cecil Abernathy DO   Primary Care Physician: Hector Arndt DNP, FNP-C  Principal Problem:Denice gangrene    Subjective:     HPI: The patient is known from the previous nephrology consult at [facility].  He is no at Specialty and continues to need dialysis support.      Interval History: The patient is doing acceptable.  He tolerated dialysis on today.    Review of patient's allergies indicates:  No Known Allergies  Current Facility-Administered Medications   Medication Frequency    0.9%  NaCl infusion PRN    acetaminophen tablet 500 mg Q6H PRN    acetic acid 0.25 % irrigation PRN    albuterol nebulizer solution 2.5 mg Q4H PRN    albuterol-ipratropium 2.5 mg-0.5 mg/3 mL nebulizer solution 3 mL Q6H    bisacodyL EC tablet 10 mg Daily PRN    calcium gluconate 1 g in dextrose 5 % in water (D5W) 5 % 100 mL IVPB (MB+) Once    dextrose 50% injection 12.5 g PRN    diltiaZEM tablet 90 mg Q6H    fentaNYL 25 mcg/hr 1 patch Q72H    glucagon (human recombinant) injection 1 mg PRN    guaiFENesin 100 mg/5 ml syrup 200 mg Q6H    heparin (porcine) injection 4,000 Units PRN    heparin (porcine) injection 5,000 Units Q8H    hydrALAZINE tablet 25 mg Q8H    insulin aspart U-100 injection 1-10 Units Q6H    insulin detemir U-100 injection 25 Units QHS    ondansetron injection 8 mg Q6H PRN    pantoprazole suspension 40 mg Daily    predniSONE tablet 10 mg Daily    sevelamer carbonate pwpk 0.8 g TID WM    silver sulfADIAZINE 1% cream Daily PRN    simethicone chewable tablet 80 mg TID PRN       Objective:     Vital Signs (Most Recent):  Temp: 98.6 °F (37 °C) (03/28/22 1222)  Pulse: 105 (03/28/22 1415)  Resp: (!) 36 (03/28/22 1415)  BP: (!) 142/70 (03/28/22 1403)  SpO2: 97 % (03/28/22 1415)  O2 Device (Oxygen Therapy):  ventilator (03/28/22 1415)   Vital Signs (24h Range):  Temp:  [98.6 °F (37 °C)-99.5 °F (37.5 °C)] 98.6 °F (37 °C)  Pulse:  [] 105  Resp:  [18-50] 36  SpO2:  [88 %-100 %] 97 %  BP: (116-161)/(50-78) 142/70     Weight: 75.3 kg (166 lb 0.1 oz) (03/28/22 0615)  Body mass index is 23.15 kg/m².  Body surface area is 1.94 meters squared.    I/O last 3 completed shifts:  In: 660 [NG/GT:660]  Out: -     Physical Exam  Vitals reviewed.   HENT:      Head: Atraumatic.   Cardiovascular:      Rate and Rhythm: Regular rhythm.   Pulmonary:      Breath sounds: Normal breath sounds.      Comments: Ventilated.  Abdominal:      Palpations: Abdomen is soft.   Neurological:      Mental Status: He is alert.       Significant Labs:  BMP: No results for input(s): GLU, NA, K, CL, CO2, BUN, CREATININE, CALCIUM, MG in the last 168 hours.  CBC: No results for input(s): WBC, RBC, HGB, HCT, PLT, MCV, MCH, MCHC in the last 168 hours.     Significant Imaging:  Labs: Reviewed    Assessment/Plan:     ESRD (end stage renal disease)  3/22/2022 Continue with dialysis as scheduled.  3/24/2022 The patient continues to do well with dialysis.  3/25/2022  Continue with dialysis as scheduled.  3/28/2022 Dialysis as scheduled.    On mechanically assisted ventilation  Wean as tolerated  The patient continues to do CPAP trials.        Thank you for your consult. I will follow-up with patient. Please contact us if you have any additional questions.    Edwin Pryor Jr, MD  Nephrology  [facility]

## 2022-03-28 NOTE — RESPIRATORY THERAPY
Patient tolerated trach collar at 35%. Placed patient back on CPAP 12/5 35%, patient went into respiratory distress,  respirations increased >60 and tidal volumes dropped. Patient placed back on A/C 14,480, 5 , 35%

## 2022-03-28 NOTE — PROGRESS NOTES
[facility]  Pulmonology  Progress Note    Patient Name: Og Garcia  MRN: [id number]  Admission Date: 12/23/2021  Hospital Length of Stay: 95 days  Code Status: Prior  Attending Provider: Cecil Abernathy DO  Primary Care Provider: Hector Arndt DNP, FNP-C   Principal Problem: Denice gangrene    Subjective:     Interval History: No acute events overnight. The patient is currently resting comfortably. He is doing well with trach collar. He is currently afebrile and vital signs are stable.    Objective:     Vital Signs (Most Recent):  Temp: 98.6 °F (37 °C) (03/28/22 1222)  Pulse: 105 (03/28/22 1415)  Resp: (!) 36 (03/28/22 1415)  BP: (!) 142/70 (03/28/22 1403)  SpO2: 97 % (03/28/22 1415)   Vital Signs (24h Range):  Temp:  [98.6 °F (37 °C)-99.5 °F (37.5 °C)] 98.6 °F (37 °C)  Pulse:  [] 105  Resp:  [18-50] 36  SpO2:  [88 %-100 %] 97 %  BP: (116-161)/(50-78) 142/70     Weight: 75.3 kg (166 lb 0.1 oz)  Body mass index is 23.15 kg/m².      Intake/Output Summary (Last 24 hours) at 3/28/2022 1652  Last data filed at 3/28/2022 1222  Gross per 24 hour   Intake 810 ml   Output 2500 ml   Net -1690 ml         Physical Exam  Vitals reviewed.   HENT:      Right Ear: External ear normal.      Left Ear: External ear normal.      Mouth/Throat:      Mouth: Mucous membranes are moist.   Eyes:      Pupils: Pupils are equal, round, and reactive to light.   Cardiovascular:      Rate and Rhythm: Normal rate and regular rhythm.   Pulmonary:      Effort: Pulmonary effort is normal.      Breath sounds: Normal breath sounds.   Abdominal:      Palpations: Abdomen is soft.   Musculoskeletal:         General: Normal range of motion.      Cervical back: Normal range of motion and neck supple.   Skin:     General: Skin is warm and dry.      Capillary Refill: Capillary refill takes less than 2 seconds.   Neurological:      Mental Status: He is alert. Mental status is at baseline.   Psychiatric:         Mood  and Affect: Mood normal.       Vents:  Vent Mode: PS/CPAP (03/28/22 1415)  Set Rate: 14 BPM (03/28/22 0050)  Vt Set: 480 mL (03/28/22 0050)  Pressure Support: 12 cmH20 (03/28/22 1415)  PEEP/CPAP: 5 cmH20 (03/28/22 1415)  Oxygen Concentration (%): 35 (03/28/22 1415)  Peak Airway Pressure: 21 cmH2O (03/28/22 1415)  Plateau Pressure: 20 cmH20 (03/07/22 0500)  Total Ve: 9.7 mL (03/28/22 1415)  F/VT Ratio<105 (RSBI): 122.87 (03/28/22 1415)    Lines/Drains/Airways       Peripherally Inserted Central Catheter Line  Duration             PICC Double Lumen 01/05/22 left basilic 82 days              Central Venous Catheter Line  Duration                  Hemodialysis Catheter 12/30/21 0900 right internal jugular 88 days              Drain  Duration                  Colostomy 12/18/21 1030 Descending/sigmoid  days         Gastrostomy/Enterostomy 03/09/22 1436 Percutaneous endoscopic gastrostomy (PEG) midline feeding 19 days              Airway  Duration                  Surgical Airway 01/04/22 Shiley 83 days                    Significant Labs:    CBC/Anemia Profile:  No results for input(s): WBC, HGB, HCT, PLT, MCV, RDW, IRON, FERRITIN, RETIC, FOLATE, [id number], OCCULTBLOOD in the last 48 hours.     Chemistries:  No results for input(s): NA, K, CL, CO2, BUN, CREATININE, CALCIUM, ALBUMIN, PROT, BILITOT, ALKPHOS, ALT, AST, GLUCOSE, MG, PHOS in the last 48 hours.    All pertinent labs within the past 24 hours have been reviewed.    Significant Imaging:  I have reviewed all pertinent imaging results/findings within the past 24 hours.    Assessment/Plan:     * Denice gangrene  - s/p multiple debridements  - wound vac in place  - ID consulted for antibiotic management: He was treated with rocephin, daptomycin, clindamycin. 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 Cazares  2/21- back to OR for skin grafting  3/9- had peg tube placed   03/23/2022 no changes    ESRD (end stage renal disease)  Started on HD 12/30   Continue with HD per nephrology      Type 2 diabetes mellitus without complication, without long-term current use of insulin  accuchecks look ok  Continue with current care      On mechanically assisted ventilation  - 12/14 intubated, tracheostomy 1/4    3/28-- increase trach collar to 8 hours BID as tolerated     Hypertension  Blood pressure is ok  Continue with current care                   Cecil Abernathy,   Pulmonology  [facility]

## 2022-03-28 NOTE — ASSESSMENT & PLAN NOTE
3/22/2022 Continue with dialysis as scheduled.  3/24/2022 The patient continues to do well with dialysis.  3/25/2022  Continue with dialysis as scheduled.  3/28/2022 Dialysis as scheduled.

## 2022-03-29 NOTE — NURSING
Lying in bed, eyes closed, resp even, NADN, remains on ac mode, no further issues this shift, safety measures on going, bed down, cb near, tolerated trach collar until 1430, 2 Liters of fluid were removed with HD today

## 2022-03-29 NOTE — PROGRESS NOTES
Patient is unable to do CPAP trial or Trach Collar trial at this time.  Patients respiratory rate is high on ACV mode.  I will continue to monitor patient.

## 2022-03-29 NOTE — NURSING
Lying in bed resting.  No noted distress. Skin warm to touch.  Respiration even and unlabored. Peg tube intact and secure with Nepro @ 40 ml/hr. Dressing to bilateral heels,sacral intact and secure. Right thigh open to air.Trach midline and intact and secure with no complications. Pt on AC 14/480/5/35%. Left picc line intact and secure. SCD hose intact.    0010 Lying in bed resting.  No noted distress.  Safety measures ongoing.    0430 Pt received bath at this time.    0617 Lying in bed resting.  No noted distress. Am meds given. Safety measures ongoing.     0733 Fetanyl patch removed from left arm  And placed on right arm.

## 2022-03-29 NOTE — PROGRESS NOTES
[facility]  Pulmonology  Progress Note    Patient Name: Og Garcia  MRN: [id number]  Admission Date: 12/23/2021  Hospital Length of Stay: 96 days  Code Status: Prior  Attending Provider: Cecil Abernathy DO  Primary Care Provider: Hector Arndt DNP, FNP-C   Principal Problem: Denice gangrene    Subjective:     Interval History: No acute events overnight. The patient is currently resting comfortably. He is currently afebrile and vital signs are stable.    Objective:     Vital Signs (Most Recent):  Temp: 99.1 °F (37.3 °C) (03/29/22 1200)  Pulse: 104 (03/29/22 1206)  Resp: 14 (03/29/22 1206)  BP: 114/62 (03/29/22 0616)  SpO2: 100 % (03/29/22 1206)   Vital Signs (24h Range):  Temp:  [97.9 °F (36.6 °C)-100.8 °F (38.2 °C)] 99.1 °F (37.3 °C)  Pulse:  [] 104  Resp:  [14-44] 14  SpO2:  [92 %-100 %] 100 %  BP: ()/(55-76) 114/62     Weight: 76.3 kg (168 lb 3.4 oz)  Body mass index is 23.46 kg/m².      Intake/Output Summary (Last 24 hours) at 3/29/2022 1323  Last data filed at 3/29/2022 0600  Gross per 24 hour   Intake 1880 ml   Output 0 ml   Net 1880 ml         Physical Exam  Vitals reviewed.   HENT:      Right Ear: External ear normal.      Left Ear: External ear normal.      Mouth/Throat:      Mouth: Mucous membranes are moist.   Eyes:      Pupils: Pupils are equal, round, and reactive to light.   Cardiovascular:      Rate and Rhythm: Normal rate and regular rhythm.   Pulmonary:      Effort: Pulmonary effort is normal.      Breath sounds: Normal breath sounds.   Abdominal:      Palpations: Abdomen is soft.   Musculoskeletal:         General: Normal range of motion.      Cervical back: Normal range of motion and neck supple.   Skin:     General: Skin is warm and dry.      Capillary Refill: Capillary refill takes less than 2 seconds.   Neurological:      Mental Status: He is alert. Mental status is at baseline.   Psychiatric:         Mood and Affect: Mood normal.        Vents:  Vent Mode: PS/CPAP (03/29/22 1206)  Set Rate: 14 BPM (03/29/22 0717)  Vt Set: 480 mL (03/29/22 0717)  Pressure Support: 12 cmH20 (03/29/22 1206)  PEEP/CPAP: 5 cmH20 (03/29/22 1206)  Oxygen Concentration (%): 35 (03/29/22 1206)  Peak Airway Pressure: 19 cmH2O (03/29/22 1206)  Plateau Pressure: 20 cmH20 (03/07/22 0500)  Total Ve: 11.4 mL (03/29/22 1206)  F/VT Ratio<105 (RSBI): (!) 34.91 (03/29/22 1206)    Lines/Drains/Airways       Peripherally Inserted Central Catheter Line  Duration             PICC Double Lumen 01/05/22 left basilic 83 days              Central Venous Catheter Line  Duration                  Hemodialysis Catheter 12/30/21 0900 right internal jugular 89 days              Drain  Duration                  Colostomy 12/18/21 1030 Descending/sigmoid  days         Gastrostomy/Enterostomy 03/09/22 1436 Percutaneous endoscopic gastrostomy (PEG) midline feeding 19 days              Airway  Duration                  Surgical Airway 01/04/22 Shiley 84 days                    Significant Labs:    CBC/Anemia Profile:  No results for input(s): WBC, HGB, HCT, PLT, MCV, RDW, IRON, FERRITIN, RETIC, FOLATE, [id number], OCCULTBLOOD in the last 48 hours.     Chemistries:  No results for input(s): NA, K, CL, CO2, BUN, CREATININE, CALCIUM, ALBUMIN, PROT, BILITOT, ALKPHOS, ALT, AST, GLUCOSE, MG, PHOS in the last 48 hours.    All pertinent labs within the past 24 hours have been reviewed.    Significant Imaging:  I have reviewed all pertinent imaging results/findings within the past 24 hours.    Assessment/Plan:     * Denice gangrene  - s/p multiple debridements  - wound vac in place  - ID consulted for antibiotic management: He was treated with rocephin, daptomycin, clindamycin. 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 Cazares  2/21- back to OR for skin grafting  3/9- had peg tube placed   03/23/2022 no changes    ESRD (end stage renal disease)  Started on HD 12/30   Continue with HD per nephrology      Type 2 diabetes mellitus without complication, without long-term current use of insulin  accuchecks look ok  Continue with current care      On mechanically assisted ventilation  - 12/14 intubated, tracheostomy 1/4    3/28-- increase trach collar to 8 hours BID as tolerated   3/29- He had some issues with trach collar overnight (increased RR) and was put back on ventilator  I have flipped to cpap during my exam and he is doing well. We will try trach collar again today    Hypertension  Blood pressure is ok  Continue with current care                   Cecil Abernathy,   Pulmonology  [facility]

## 2022-03-29 NOTE — PT/OT/SLP PROGRESS
Occupational Therapy      Patient Name:  Og Garcia   MRN:  [id number]    Patient not seen today secondary to Dialysis. Will follow-up 3/29/2022.    3/29/2022

## 2022-03-29 NOTE — PT/OT/SLP PROGRESS
Occupational Therapy   Treatment    Name: Og Garcia  MRN: [id number]  Admitting Diagnosis:  Denice gangrene       Recommendations:     Discharge Recommendations: nursing facility, skilled, intermediate care facility/nursing home, nursing facility, basic  Discharge Equipment Recommendations:   (to be determined)  Barriers to discharge:   (on going medical treatment)    Assessment:     Og Garcia is a 66 y.o. male with a medical diagnosis of Denice gangrene.  He presents with eyes open, but not attempting communication, not following commands, not assisting with movement today. Performance deficits affecting function are weakness, impaired cardiopulmonary response to activity, impaired cognition, impaired endurance, impaired functional mobilty, impaired joint extensibility, impaired fine motor, impaired coordination, impaired self care skills, impaired skin, decreased lower extremity function, decreased ROM, decreased upper extremity function.     Rehab Prognosis:  Fair; patient would benefit from acute skilled OT services to address these deficits and reach maximum level of function.       Plan:     Patient to be seen 5 x/week to address the above listed problems via self-care/home management, therapeutic activities, therapeutic exercises  · Plan of Care Expires: 04/01/22  · Plan of Care Reviewed with: patient    Subjective     Pain/Comfort:  · Pain Rating 1: 0/10  · Pain Rating Post-Intervention 1: 0/10    Objective:     Communicated with: STEVEN Crook prior to session.  Patient found HOB elevated  And tilted to (R) with telemetry, Tracheostomy, peripheral IV, blood pressure cuff, oxygen upon OT entry to room.    General Precautions: Standard, NPO, respiratory, vision impaired (no sitting due to sacral wound)   Orthopedic Precautions:    Braces: N/A  Respiratory Status: trach collar     Occupational Performance:     Bed Mobility:    · NT    Functional Mobility/Transfers:  · NT    Activities of  Daily Living:  · NT      AMPA 6 Click ADL: 6    Treatment & Education:  AAROM was attempted. Pt did not assist. PROM to (B) UE for 10 reps through all planes. (B) hands positioned with hand rolls to protect palms and extend digits.    Patient left HOB elevated and tilted to (R) with all lines intact, call button in reach and RN Oralia notifiedEducation:      GOALS:   Multidisciplinary Problems     Occupational Therapy Goals        Problem: Occupational Therapy    Goal Priority Disciplines Outcome Interventions   Occupational Therapy Goal     OT, PT/OT Ongoing, Progressing    Description: Grooming:   Short Term Goal: Pt will perform grooming with mod(A) and full setup  Long Term Goal: Pt will perform grooming with min(A) and full setup    UE Dressing:   Short Term Goal: Pt will perform UE dressing with mod(A) and heavy cues  Long Term Goal: Pt will perform UE dressing with min(A)    Endurance Status:   Short Term Goal:pt to perform 15 min OT treatment with 3 or greater rest breaks  Long Term Goal: pt to perform 25 min min OT treat with 2 or less rest breaks    ROM Status: AROM is severely impaired, PROM is moderately impaired with heavy tone/ resistance  Short Term Goal: Pt will increase AAROM of (B) shoulders to 1/2 range  Long Term Goal: Pt will increase (B) UE AAROM to 2/3 range                         Time Tracking:     OT Date of Treatment: 03/29/22  OT Start Time: 1330  OT Stop Time: 1344  OT Total Time (min): 14 min    Billable Minutes:Total Time 14 min, no charge due to lack of pt participation.    OT/PAKO: OT          3/29/2022

## 2022-03-29 NOTE — PLAN OF CARE
Problem: Communication Impairment (Mechanical Ventilation, Invasive)  Goal: Effective Communication  Outcome: Ongoing, Progressing     Problem: Communication Impairment (Artificial Airway)  Goal: Effective Communication  Outcome: Ongoing, Progressing     Problem: Gas Exchange Impaired  Goal: Optimal Gas Exchange  Outcome: Ongoing, Progressing

## 2022-03-29 NOTE — SUBJECTIVE & OBJECTIVE
Interval History: No acute events overnight. The patient is currently resting comfortably. He is currently afebrile and vital signs are stable.    Objective:     Vital Signs (Most Recent):  Temp: 99.1 °F (37.3 °C) (03/29/22 1200)  Pulse: 104 (03/29/22 1206)  Resp: 14 (03/29/22 1206)  BP: 114/62 (03/29/22 0616)  SpO2: 100 % (03/29/22 1206)   Vital Signs (24h Range):  Temp:  [97.9 °F (36.6 °C)-100.8 °F (38.2 °C)] 99.1 °F (37.3 °C)  Pulse:  [] 104  Resp:  [14-44] 14  SpO2:  [92 %-100 %] 100 %  BP: ()/(55-76) 114/62     Weight: 76.3 kg (168 lb 3.4 oz)  Body mass index is 23.46 kg/m².      Intake/Output Summary (Last 24 hours) at 3/29/2022 1323  Last data filed at 3/29/2022 0600  Gross per 24 hour   Intake 1880 ml   Output 0 ml   Net 1880 ml         Physical Exam  Vitals reviewed.   HENT:      Right Ear: External ear normal.      Left Ear: External ear normal.      Mouth/Throat:      Mouth: Mucous membranes are moist.   Eyes:      Pupils: Pupils are equal, round, and reactive to light.   Cardiovascular:      Rate and Rhythm: Normal rate and regular rhythm.   Pulmonary:      Effort: Pulmonary effort is normal.      Breath sounds: Normal breath sounds.   Abdominal:      Palpations: Abdomen is soft.   Musculoskeletal:         General: Normal range of motion.      Cervical back: Normal range of motion and neck supple.   Skin:     General: Skin is warm and dry.      Capillary Refill: Capillary refill takes less than 2 seconds.   Neurological:      Mental Status: He is alert. Mental status is at baseline.   Psychiatric:         Mood and Affect: Mood normal.       Vents:  Vent Mode: PS/CPAP (03/29/22 1206)  Set Rate: 14 BPM (03/29/22 0717)  Vt Set: 480 mL (03/29/22 0717)  Pressure Support: 12 cmH20 (03/29/22 1206)  PEEP/CPAP: 5 cmH20 (03/29/22 1206)  Oxygen Concentration (%): 35 (03/29/22 1206)  Peak Airway Pressure: 19 cmH2O (03/29/22 1206)  Plateau Pressure: 20 cmH20 (03/07/22 0500)  Total Ve: 11.4 mL (03/29/22  1206)  F/VT Ratio<105 (RSBI): (!) 34.91 (03/29/22 1206)    Lines/Drains/Airways       Peripherally Inserted Central Catheter Line  Duration             PICC Double Lumen 01/05/22 left basilic 83 days              Central Venous Catheter Line  Duration                  Hemodialysis Catheter 12/30/21 0900 right internal jugular 89 days              Drain  Duration                  Colostomy 12/18/21 1030 Descending/sigmoid  days         Gastrostomy/Enterostomy 03/09/22 1436 Percutaneous endoscopic gastrostomy (PEG) midline feeding 19 days              Airway  Duration                  Surgical Airway 01/04/22 Shiley 84 days                    Significant Labs:    CBC/Anemia Profile:  No results for input(s): WBC, HGB, HCT, PLT, MCV, RDW, IRON, FERRITIN, RETIC, FOLATE, [id number], OCCULTBLOOD in the last 48 hours.     Chemistries:  No results for input(s): NA, K, CL, CO2, BUN, CREATININE, CALCIUM, ALBUMIN, PROT, BILITOT, ALKPHOS, ALT, AST, GLUCOSE, MG, PHOS in the last 48 hours.    All pertinent labs within the past 24 hours have been reviewed.    Significant Imaging:  I have reviewed all pertinent imaging results/findings within the past 24 hours.

## 2022-03-29 NOTE — ASSESSMENT & PLAN NOTE
- 12/14 intubated, tracheostomy 1/4    3/28-- increase trach collar to 8 hours BID as tolerated   3/29- He had some issues with trach collar overnight (increased RR) and was put back on ventilator  I have flipped to cpap during my exam and he is doing well. We will try trach collar again today

## 2022-03-29 NOTE — ASSESSMENT & PLAN NOTE
3/22/2022 Continue with dialysis as scheduled.  3/24/2022 The patient continues to do well with dialysis.  3/25/2022  Continue with dialysis as scheduled.  3/28/2022 Dialysis as scheduled.  3/29/2022  Continue with dialysis schedule

## 2022-03-29 NOTE — ASSESSMENT & PLAN NOTE
Continue wound care  Wound care.  Continue with wound care  3/19/2022 Wound care management  3/22/2022 Continue with wound management  3/29/2022 Wound care.

## 2022-03-29 NOTE — NURSING
While on trach collar, RT was at bedside and reports increased resp of 60, unable to hold TV, placed on cpap with no improvements, then placed  on ac 14 480 5 35%

## 2022-03-29 NOTE — PROGRESS NOTES
[facility]  Nephrology  Progress Note    Patient Name: Og Garcia  MRN: [id number]  Admission Date: 12/23/2021  Hospital Length of Stay: 96 days  Attending Provider: Cecil Abernathy DO   Primary Care Physician: Hector Arndt DNP, FNP-C  Principal Problem:Denice gangrene    Subjective:     HPI: The patient is known from the previous nephrology consult at [facility].  He is no at Specialty and continues to need dialysis support.      Interval History: The patient is doing acceptable.  No acute changes.  He is continuing with CPAP trials.    Review of patient's allergies indicates:  No Known Allergies  Current Facility-Administered Medications   Medication Frequency    0.9%  NaCl infusion PRN    acetaminophen tablet 500 mg Q6H PRN    acetic acid 0.25 % irrigation PRN    albuterol nebulizer solution 2.5 mg Q4H PRN    albuterol-ipratropium 2.5 mg-0.5 mg/3 mL nebulizer solution 3 mL Q6H    bisacodyL EC tablet 10 mg Daily PRN    calcium gluconate 1 g in dextrose 5 % in water (D5W) 5 % 100 mL IVPB (MB+) Once    dextrose 50% injection 12.5 g PRN    diltiaZEM tablet 90 mg Q6H    fentaNYL 25 mcg/hr 1 patch Q72H    glucagon (human recombinant) injection 1 mg PRN    guaiFENesin 100 mg/5 ml syrup 200 mg Q6H    heparin (porcine) injection 4,000 Units PRN    heparin (porcine) injection 5,000 Units Q8H    hydrALAZINE tablet 25 mg Q8H    insulin aspart U-100 injection 1-10 Units Q6H    insulin detemir U-100 injection 25 Units QHS    ondansetron injection 8 mg Q6H PRN    pantoprazole suspension 40 mg Daily    predniSONE tablet 10 mg Daily    sevelamer carbonate pwpk 0.8 g TID WM    silver sulfADIAZINE 1% cream Daily PRN    simethicone chewable tablet 80 mg TID PRN       Objective:     Vital Signs (Most Recent):  Temp: 99.1 °F (37.3 °C) (03/29/22 1200)  Pulse: 104 (03/29/22 1206)  Resp: 14 (03/29/22 1206)  BP: 114/62 (03/29/22 0616)  SpO2: 100 % (03/29/22 1206)  O2 Device (Oxygen  Therapy): ventilator (03/29/22 1441) Vital Signs (24h Range):  Temp:  [97.9 °F (36.6 °C)-100.8 °F (38.2 °C)] 99.1 °F (37.3 °C)  Pulse:  [] 104  Resp:  [14-44] 14  SpO2:  [94 %-100 %] 100 %  BP: ()/(55-76) 114/62     Weight: 76.3 kg (168 lb 3.4 oz) (03/29/22 0600)  Body mass index is 23.46 kg/m².  Body surface area is 1.96 meters squared.    I/O last 3 completed shifts:  In: 2690 [NG/GT:2690]  Out: 2500 [Other:2500]    Physical Exam  Vitals reviewed.   HENT:      Head: Atraumatic.   Cardiovascular:      Rate and Rhythm: Regular rhythm.   Pulmonary:      Effort: Pulmonary effort is normal.      Breath sounds: Normal breath sounds.   Abdominal:      General: Abdomen is flat.      Palpations: Abdomen is soft.   Neurological:      Mental Status: He is alert.       Significant Labs:  BMP: No results for input(s): GLU, NA, K, CL, CO2, BUN, CREATININE, CALCIUM, MG in the last 168 hours.  CBC: No results for input(s): WBC, RBC, HGB, HCT, PLT, MCV, MCH, MCHC in the last 168 hours.     Significant Imaging:  Labs: Reviewed    Assessment/Plan:     * Denice gangrene  Continue wound care  Wound care.  Continue with wound care  3/19/2022 Wound care management  3/22/2022 Continue with wound management  3/29/2022 Wound care.    ESRD (end stage renal disease)  3/22/2022 Continue with dialysis as scheduled.  3/24/2022 The patient continues to do well with dialysis.  3/25/2022  Continue with dialysis as scheduled.  3/28/2022 Dialysis as scheduled.  3/29/2022  Continue with dialysis schedule        Thank you for your consult. I will follow-up with patient. Please contact us if you have any additional questions.    Edwin Pryor Jr, MD  Nephrology  [facility] [place]

## 2022-03-29 NOTE — SUBJECTIVE & OBJECTIVE
Interval History: The patient is doing acceptable.  No acute changes.  He is continuing with CPAP trials.    Review of patient's allergies indicates:  No Known Allergies  Current Facility-Administered Medications   Medication Frequency    0.9%  NaCl infusion PRN    acetaminophen tablet 500 mg Q6H PRN    acetic acid 0.25 % irrigation PRN    albuterol nebulizer solution 2.5 mg Q4H PRN    albuterol-ipratropium 2.5 mg-0.5 mg/3 mL nebulizer solution 3 mL Q6H    bisacodyL EC tablet 10 mg Daily PRN    calcium gluconate 1 g in dextrose 5 % in water (D5W) 5 % 100 mL IVPB (MB+) Once    dextrose 50% injection 12.5 g PRN    diltiaZEM tablet 90 mg Q6H    fentaNYL 25 mcg/hr 1 patch Q72H    glucagon (human recombinant) injection 1 mg PRN    guaiFENesin 100 mg/5 ml syrup 200 mg Q6H    heparin (porcine) injection 4,000 Units PRN    heparin (porcine) injection 5,000 Units Q8H    hydrALAZINE tablet 25 mg Q8H    insulin aspart U-100 injection 1-10 Units Q6H    insulin detemir U-100 injection 25 Units QHS    ondansetron injection 8 mg Q6H PRN    pantoprazole suspension 40 mg Daily    predniSONE tablet 10 mg Daily    sevelamer carbonate pwpk 0.8 g TID WM    silver sulfADIAZINE 1% cream Daily PRN    simethicone chewable tablet 80 mg TID PRN       Objective:     Vital Signs (Most Recent):  Temp: 99.1 °F (37.3 °C) (03/29/22 1200)  Pulse: 104 (03/29/22 1206)  Resp: 14 (03/29/22 1206)  BP: 114/62 (03/29/22 0616)  SpO2: 100 % (03/29/22 1206)  O2 Device (Oxygen Therapy): ventilator (03/29/22 1441) Vital Signs (24h Range):  Temp:  [97.9 °F (36.6 °C)-100.8 °F (38.2 °C)] 99.1 °F (37.3 °C)  Pulse:  [] 104  Resp:  [14-44] 14  SpO2:  [94 %-100 %] 100 %  BP: ()/(55-76) 114/62     Weight: 76.3 kg (168 lb 3.4 oz) (03/29/22 0600)  Body mass index is 23.46 kg/m².  Body surface area is 1.96 meters squared.    I/O last 3 completed shifts:  In: 2690 [NG/GT:2690]  Out: 2500 [Other:2500]    Physical Exam  Vitals reviewed.   HENT:      Head:  Atraumatic.   Cardiovascular:      Rate and Rhythm: Regular rhythm.   Pulmonary:      Effort: Pulmonary effort is normal.      Breath sounds: Normal breath sounds.   Abdominal:      General: Abdomen is flat.      Palpations: Abdomen is soft.   Neurological:      Mental Status: He is alert.       Significant Labs:  BMP: No results for input(s): GLU, NA, K, CL, CO2, BUN, CREATININE, CALCIUM, MG in the last 168 hours.  CBC: No results for input(s): WBC, RBC, HGB, HCT, PLT, MCV, MCH, MCHC in the last 168 hours.     Significant Imaging:  Labs: Reviewed

## 2022-03-30 NOTE — PLAN OF CARE
Problem: Communication Impairment (Mechanical Ventilation, Invasive)  Goal: Effective Communication  Outcome: Ongoing, Progressing     Problem: Device-Related Complication Risk (Mechanical Ventilation, Invasive)  Goal: Optimal Device Function  Outcome: Ongoing, Progressing     Problem: Inability to Wean (Mechanical Ventilation, Invasive)  Goal: Mechanical Ventilation Liberation  Outcome: Ongoing, Progressing     Problem: Hemodynamic Instability (Hemodialysis)  Goal: Effective Tissue Perfusion  Outcome: Ongoing, Progressing     Problem: Infection (Hemodialysis)  Goal: Absence of Infection Signs and Symptoms  Outcome: Ongoing, Progressing     Problem: Gas Exchange Impaired  Goal: Optimal Gas Exchange  Outcome: Ongoing, Progressing

## 2022-03-30 NOTE — NURSING
Lying in bed resting.  No noted distress.  Respiration even and unlabored.  Skin warm to touch. #8LPC trach midline, intact and secure with no complications at this time. Pt on CPAP mode at this time.Left picc line intact and secure with no complications. Moderate amount of picc line pulled out. Right subclavian hemodialysis intact and secure. Nepro @ 40 ml/hr via peg tube. Dressing to bilateral feet and sacral. Abdomen and right thigh open to air. SCD hose on and in use to bilateral lower extremities. Safety measures ongoing.     0010 Pt lying in bed resting. No noted distress. No complaints at this time. Safety measures ongoing.      0330 Lying in bed resting.  No acute distress or complaints. Received bath at this time. Safety measures ongoing.    0630 Lying in bed resting. No noted distress or acute changes. Safety measures ongoing.    0650 Bedside report given. Safety measures ongoing.

## 2022-03-30 NOTE — SUBJECTIVE & OBJECTIVE
Interval History: No acute events overnight. The patient is currently resting comfortably. He is currently afebrile and vital signs are stable.    Objective:     Vital Signs (Most Recent):  Temp: 98.2 °F (36.8 °C) (03/30/22 1200)  Pulse: 108 (03/30/22 1330)  Resp: (!) 23 (03/30/22 1330)  BP: (!) 114/59 (03/30/22 1330)  SpO2: 100 % (03/30/22 1330)   Vital Signs (24h Range):  Temp:  [98.1 °F (36.7 °C)-100.8 °F (38.2 °C)] 98.2 °F (36.8 °C)  Pulse:  [] 108  Resp:  [14-35] 23  SpO2:  [93 %-100 %] 100 %  BP: (110-146)/(54-78) 114/59     Weight: 80 kg (176 lb 5.9 oz)  Body mass index is 24.6 kg/m².      Intake/Output Summary (Last 24 hours) at 3/30/2022 1427  Last data filed at 3/30/2022 1200  Gross per 24 hour   Intake 1240 ml   Output 2600 ml   Net -1360 ml         Physical Exam  Vitals reviewed.   HENT:      Right Ear: External ear normal.      Left Ear: External ear normal.      Mouth/Throat:      Mouth: Mucous membranes are moist.   Eyes:      Pupils: Pupils are equal, round, and reactive to light.   Cardiovascular:      Rate and Rhythm: Normal rate and regular rhythm.   Pulmonary:      Effort: Pulmonary effort is normal.      Breath sounds: Normal breath sounds.   Abdominal:      Palpations: Abdomen is soft.   Musculoskeletal:         General: Normal range of motion.      Cervical back: Normal range of motion and neck supple.   Skin:     General: Skin is warm and dry.      Capillary Refill: Capillary refill takes less than 2 seconds.   Neurological:      Mental Status: He is alert. Mental status is at baseline.   Psychiatric:         Mood and Affect: Mood normal.       Vents:  Vent Mode: PS/CPAP (03/30/22 0815)  Set Rate: 14 BPM (03/29/22 0717)  Vt Set: 480 mL (03/29/22 0717)  Pressure Support: 12 cmH20 (03/30/22 0815)  PEEP/CPAP: 5 cmH20 (03/30/22 0815)  Oxygen Concentration (%): 35 (03/30/22 0815)  Peak Airway Pressure: 18 cmH2O (03/30/22 0450)  Plateau Pressure: 20 cmH20 (03/07/22 0500)  Total Ve: 12.3 mL  (03/30/22 0815)  F/VT Ratio<105 (RSBI): (!) 48.19 (03/30/22 0450)    Lines/Drains/Airways       Peripherally Inserted Central Catheter Line  Duration             PICC Double Lumen 01/05/22 left basilic 84 days              Central Venous Catheter Line  Duration                  Hemodialysis Catheter 12/30/21 0900 right internal jugular 90 days              Drain  Duration                  Colostomy 12/18/21 1030 Descending/sigmoid  days         Gastrostomy/Enterostomy 03/09/22 1436 Percutaneous endoscopic gastrostomy (PEG) midline feeding 20 days              Airway  Duration                  Surgical Airway 01/04/22 Shiley 85 days                    Significant Labs:    CBC/Anemia Profile:  No results for input(s): WBC, HGB, HCT, PLT, MCV, RDW, IRON, FERRITIN, RETIC, FOLATE, [id number], OCCULTBLOOD in the last 48 hours.     Chemistries:  No results for input(s): NA, K, CL, CO2, BUN, CREATININE, CALCIUM, ALBUMIN, PROT, BILITOT, ALKPHOS, ALT, AST, GLUCOSE, MG, PHOS in the last 48 hours.    All pertinent labs within the past 24 hours have been reviewed.    Significant Imaging:  I have reviewed all pertinent imaging results/findings within the past 24 hours.

## 2022-03-30 NOTE — PROGRESS NOTES
[facility]  Nephrology  Progress Note    Patient Name: Og Garcia  MRN: [id number]  Admission Date: 12/23/2021  Hospital Length of Stay: 97 days  Attending Provider: Cecil Abernathy DO   Primary Care Physician: Hector Arndt DNP, FNP-C  Principal Problem:Denice gangrene    Subjective:     HPI: The patient is known from the previous nephrology consult at [facility].  He is no at Specialty and continues to need dialysis support.      Interval History: The patient is doing acceptable.  CPAP trials are ongoing.  No other acute changes.    Review of patient's allergies indicates:  No Known Allergies  Current Facility-Administered Medications   Medication Frequency    0.9%  NaCl infusion PRN    acetaminophen tablet 500 mg Q6H PRN    acetic acid 0.25 % irrigation PRN    albuterol nebulizer solution 2.5 mg Q4H PRN    albuterol-ipratropium 2.5 mg-0.5 mg/3 mL nebulizer solution 3 mL Q6H    bisacodyL EC tablet 10 mg Daily PRN    calcium gluconate 1 g in dextrose 5 % in water (D5W) 5 % 100 mL IVPB (MB+) Once    dextrose 50% injection 12.5 g PRN    diltiaZEM tablet 90 mg Q6H    fentaNYL 25 mcg/hr 1 patch Q72H    glucagon (human recombinant) injection 1 mg PRN    guaiFENesin 100 mg/5 ml syrup 200 mg Q6H    heparin (porcine) injection 4,000 Units PRN    heparin (porcine) injection 5,000 Units Q8H    hydrALAZINE tablet 25 mg Q8H    insulin aspart U-100 injection 1-10 Units Q6H    insulin detemir U-100 injection 25 Units QHS    ondansetron injection 8 mg Q6H PRN    pantoprazole suspension 40 mg Daily    predniSONE tablet 10 mg Daily    sevelamer carbonate pwpk 0.8 g TID WM    silver sulfADIAZINE 1% cream Daily PRN    simethicone chewable tablet 80 mg TID PRN       Objective:     Vital Signs (Most Recent):  Temp: 98.2 °F (36.8 °C) (03/30/22 1200)  Pulse: 108 (03/30/22 1330)  Resp: (!) 23 (03/30/22 1330)  BP: (!) 114/59 (03/30/22 1330)  SpO2: 100 % (03/30/22 1330)  O2 Device  (Oxygen Therapy): Trach Collar (03/30/22 1255)   Vital Signs (24h Range):  Temp:  [98.1 °F (36.7 °C)-100.8 °F (38.2 °C)] 98.2 °F (36.8 °C)  Pulse:  [] 108  Resp:  [14-35] 23  SpO2:  [93 %-100 %] 100 %  BP: (110-146)/(54-78) 114/59     Weight: 80 kg (176 lb 5.9 oz) (03/30/22 0500)  Body mass index is 24.6 kg/m².  Body surface area is 2 meters squared.    I/O last 3 completed shifts:  In: 2040 [NG/GT:2040]  Out: 100 [Stool:100]    Physical Exam  Vitals reviewed.   HENT:      Head: Atraumatic.      Mouth/Throat:      Mouth: Mucous membranes are moist.   Cardiovascular:      Rate and Rhythm: Regular rhythm.      Heart sounds: Normal heart sounds.   Abdominal:      General: Abdomen is flat.   Skin:     General: Skin is warm.   Neurological:      Mental Status: He is alert.       Significant Labs:  BMP: No results for input(s): GLU, NA, K, CL, CO2, BUN, CREATININE, CALCIUM, MG in the last 168 hours.  CBC: No results for input(s): WBC, RBC, HGB, HCT, PLT, MCV, MCH, MCHC in the last 168 hours.     Significant Imaging:  Labs: Reviewed    Assessment/Plan:     ESRD (end stage renal disease)  3/22/2022 Continue with dialysis as scheduled.  3/24/2022 The patient continues to do well with dialysis.  3/25/2022  Continue with dialysis as scheduled.  3/28/2022 Dialysis as scheduled.  3/29/2022  Continue with dialysis schedule  3/30/2022  At this time, continue with dialysis as scheduled.        Thank you for your consult. I will follow-up with patient. Please contact us if you have any additional questions.    Edwin Pryor Jr, MD  Nephrology  [facility]

## 2022-03-30 NOTE — PROGRESS NOTES
[facility]  Pulmonology  Progress Note    Patient Name: Og Garcia  MRN: [id number]  Admission Date: 12/23/2021  Hospital Length of Stay: 97 days  Code Status: Prior  Attending Provider: Cecil Abernathy DO  Primary Care Provider: Hector Arndt DNP, FNP-C   Principal Problem: Denice gangrene    Subjective:     Interval History: No acute events overnight. The patient is currently resting comfortably. He is currently afebrile and vital signs are stable.    Objective:     Vital Signs (Most Recent):  Temp: 98.2 °F (36.8 °C) (03/30/22 1200)  Pulse: 108 (03/30/22 1330)  Resp: (!) 23 (03/30/22 1330)  BP: (!) 114/59 (03/30/22 1330)  SpO2: 100 % (03/30/22 1330)   Vital Signs (24h Range):  Temp:  [98.1 °F (36.7 °C)-100.8 °F (38.2 °C)] 98.2 °F (36.8 °C)  Pulse:  [] 108  Resp:  [14-35] 23  SpO2:  [93 %-100 %] 100 %  BP: (110-146)/(54-78) 114/59     Weight: 80 kg (176 lb 5.9 oz)  Body mass index is 24.6 kg/m².      Intake/Output Summary (Last 24 hours) at 3/30/2022 1427  Last data filed at 3/30/2022 1200  Gross per 24 hour   Intake 1240 ml   Output 2600 ml   Net -1360 ml         Physical Exam  Vitals reviewed.   HENT:      Right Ear: External ear normal.      Left Ear: External ear normal.      Mouth/Throat:      Mouth: Mucous membranes are moist.   Eyes:      Pupils: Pupils are equal, round, and reactive to light.   Cardiovascular:      Rate and Rhythm: Normal rate and regular rhythm.   Pulmonary:      Effort: Pulmonary effort is normal.      Breath sounds: Normal breath sounds.   Abdominal:      Palpations: Abdomen is soft.   Musculoskeletal:         General: Normal range of motion.      Cervical back: Normal range of motion and neck supple.   Skin:     General: Skin is warm and dry.      Capillary Refill: Capillary refill takes less than 2 seconds.   Neurological:      Mental Status: He is alert. Mental status is at baseline.   Psychiatric:         Mood and Affect: Mood normal.        Vents:  Vent Mode: PS/CPAP (03/30/22 0815)  Set Rate: 14 BPM (03/29/22 0717)  Vt Set: 480 mL (03/29/22 0717)  Pressure Support: 12 cmH20 (03/30/22 0815)  PEEP/CPAP: 5 cmH20 (03/30/22 0815)  Oxygen Concentration (%): 35 (03/30/22 0815)  Peak Airway Pressure: 18 cmH2O (03/30/22 0450)  Plateau Pressure: 20 cmH20 (03/07/22 0500)  Total Ve: 12.3 mL (03/30/22 0815)  F/VT Ratio<105 (RSBI): (!) 48.19 (03/30/22 0450)    Lines/Drains/Airways       Peripherally Inserted Central Catheter Line  Duration             PICC Double Lumen 01/05/22 left basilic 84 days              Central Venous Catheter Line  Duration                  Hemodialysis Catheter 12/30/21 0900 right internal jugular 90 days              Drain  Duration                  Colostomy 12/18/21 1030 Descending/sigmoid  days         Gastrostomy/Enterostomy 03/09/22 1436 Percutaneous endoscopic gastrostomy (PEG) midline feeding 20 days              Airway  Duration                  Surgical Airway 01/04/22 Shiley 85 days                    Significant Labs:    CBC/Anemia Profile:  No results for input(s): WBC, HGB, HCT, PLT, MCV, RDW, IRON, FERRITIN, RETIC, FOLATE, [id number], OCCULTBLOOD in the last 48 hours.     Chemistries:  No results for input(s): NA, K, CL, CO2, BUN, CREATININE, CALCIUM, ALBUMIN, PROT, BILITOT, ALKPHOS, ALT, AST, GLUCOSE, MG, PHOS in the last 48 hours.    All pertinent labs within the past 24 hours have been reviewed.    Significant Imaging:  I have reviewed all pertinent imaging results/findings within the past 24 hours.    Assessment/Plan:     * Denice gangrene  - s/p multiple debridements  - wound vac in place  - ID consulted for antibiotic management: He was treated with rocephin, daptomycin, clindamycin. 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 Cazares  2/21- back to OR for skin grafting  3/9- had peg tube placed   03/23/2022 no changes    ESRD (end stage renal disease)  Started on HD 12/30   Continue with HD per nephrology      Type 2 diabetes mellitus without complication, without long-term current use of insulin  accuchecks look ok  Continue with current care      On mechanically assisted ventilation  - 12/14 intubated, tracheostomy 1/4    3/28-- increase trach collar to 8 hours BID as tolerated   3/29- He had some issues with trach collar overnight (increased RR) and was put back on ventilator  I have flipped to cpap during my exam and he is doing well. We will try trach collar again today  3/30- did well with trach collar overnight    Hypertension  Blood pressure is ok  Continue with current care                   Cecil Abernathy, DO  Pulmonology  [facility]

## 2022-03-30 NOTE — SUBJECTIVE & OBJECTIVE
Interval History: The patient is doing acceptable.  CPAP trials are ongoing.  No other acute changes.    Review of patient's allergies indicates:  No Known Allergies  Current Facility-Administered Medications   Medication Frequency    0.9%  NaCl infusion PRN    acetaminophen tablet 500 mg Q6H PRN    acetic acid 0.25 % irrigation PRN    albuterol nebulizer solution 2.5 mg Q4H PRN    albuterol-ipratropium 2.5 mg-0.5 mg/3 mL nebulizer solution 3 mL Q6H    bisacodyL EC tablet 10 mg Daily PRN    calcium gluconate 1 g in dextrose 5 % in water (D5W) 5 % 100 mL IVPB (MB+) Once    dextrose 50% injection 12.5 g PRN    diltiaZEM tablet 90 mg Q6H    fentaNYL 25 mcg/hr 1 patch Q72H    glucagon (human recombinant) injection 1 mg PRN    guaiFENesin 100 mg/5 ml syrup 200 mg Q6H    heparin (porcine) injection 4,000 Units PRN    heparin (porcine) injection 5,000 Units Q8H    hydrALAZINE tablet 25 mg Q8H    insulin aspart U-100 injection 1-10 Units Q6H    insulin detemir U-100 injection 25 Units QHS    ondansetron injection 8 mg Q6H PRN    pantoprazole suspension 40 mg Daily    predniSONE tablet 10 mg Daily    sevelamer carbonate pwpk 0.8 g TID WM    silver sulfADIAZINE 1% cream Daily PRN    simethicone chewable tablet 80 mg TID PRN       Objective:     Vital Signs (Most Recent):  Temp: 98.2 °F (36.8 °C) (03/30/22 1200)  Pulse: 108 (03/30/22 1330)  Resp: (!) 23 (03/30/22 1330)  BP: (!) 114/59 (03/30/22 1330)  SpO2: 100 % (03/30/22 1330)  O2 Device (Oxygen Therapy): Trach Collar (03/30/22 1255)   Vital Signs (24h Range):  Temp:  [98.1 °F (36.7 °C)-100.8 °F (38.2 °C)] 98.2 °F (36.8 °C)  Pulse:  [] 108  Resp:  [14-35] 23  SpO2:  [93 %-100 %] 100 %  BP: (110-146)/(54-78) 114/59     Weight: 80 kg (176 lb 5.9 oz) (03/30/22 0500)  Body mass index is 24.6 kg/m².  Body surface area is 2 meters squared.    I/O last 3 completed shifts:  In: 2040 [NG/GT:2040]  Out: 100 [Stool:100]    Physical Exam  Vitals reviewed.   HENT:      Head:  Atraumatic.      Mouth/Throat:      Mouth: Mucous membranes are moist.   Cardiovascular:      Rate and Rhythm: Regular rhythm.      Heart sounds: Normal heart sounds.   Abdominal:      General: Abdomen is flat.   Skin:     General: Skin is warm.   Neurological:      Mental Status: He is alert.       Significant Labs:  BMP: No results for input(s): GLU, NA, K, CL, CO2, BUN, CREATININE, CALCIUM, MG in the last 168 hours.  CBC: No results for input(s): WBC, RBC, HGB, HCT, PLT, MCV, MCH, MCHC in the last 168 hours.     Significant Imaging:  Labs: Reviewed

## 2022-03-30 NOTE — ASSESSMENT & PLAN NOTE
3/22/2022 Continue with dialysis as scheduled.  3/24/2022 The patient continues to do well with dialysis.  3/25/2022  Continue with dialysis as scheduled.  3/28/2022 Dialysis as scheduled.  3/29/2022  Continue with dialysis schedule  3/30/2022  At this time, continue with dialysis as scheduled.

## 2022-03-30 NOTE — PROGRESS NOTES
Inflated patient's trach cuff and placed patient back on the vent via CPAP with previous settings for rest.  Patient did 8 hours of Trach Collar with no problems.

## 2022-03-30 NOTE — ASSESSMENT & PLAN NOTE
- 12/14 intubated, tracheostomy 1/4    3/28-- increase trach collar to 8 hours BID as tolerated   3/29- He had some issues with trach collar overnight (increased RR) and was put back on ventilator  I have flipped to cpap during my exam and he is doing well. We will try trach collar again today  3/30- did well with trach collar overnight

## 2022-03-31 NOTE — NURSING
Lying in bed resting.  No noted distress. Skin warm to touch. Respiration even and unlabored. #8 LPC trach midline, intact and secure. Pt on CPAP 12/5/35% at this time with no problems.Peg tube intact with Nepro @ 40 ml/hr with no complications. Dressing to bilateral feet and sacral intact and secure. SCD hose on and in use. Right thigh and abdomen open to air. Left picc line and intact and secure with no iv complications. Right subclavian hemodialysis catheter intact and secure. Safety measures ongoing.    0030 Lying in bed resting. Accucheck done. No acute changes or distress. Safety measures ongoing.    0500 Lying in bed resting. No noted distress.Pt receiving bath per Cindy,RN    0642 Lying in bed resting. 6AM meds given at this time. Safety measures ongoing.    0655 bedside reporting done

## 2022-03-31 NOTE — PT/OT/SLP PROGRESS
Occupational Therapy   Treatment    Name: Og Garcia  MRN: [id number]  Admitting Diagnosis:  Denice gangrene       Recommendations:     Discharge Recommendations: nursing facility, skilled, nursing facility, basic, intermediate care facility/nursing home  Discharge Equipment Recommendations:   (to be determined)  Barriers to discharge:       Assessment:     Og Garcia is a 66 y.o. male with a medical diagnosis of Denice gangrene. Performance deficits affecting function are weakness, impaired endurance, impaired self care skills, impaired coordination, decreased ROM, impaired fine motor, impaired joint extensibility.     Rehab Prognosis:  Fair and Poor; patient would benefit from acute skilled OT services to address these deficits and reach maximum level of function.       Plan:     Patient to be seen 5 x/week to address the above listed problems via self-care/home management, therapeutic activities, therapeutic exercises  · Plan of Care Expires: 04/01/22  · Plan of Care Reviewed with: patient    Subjective     Pain/Comfort:  · Pain Rating 1: 0/10    Objective:     Communicated with: STEVEN Godfrey prior to session.  Patient found HOB elevated with telemetry, Tracheostomy, peripheral IV, blood pressure cuff, oxygen, pulse ox (continuous) upon OT entry to room.    General Precautions: Standard, vision impaired   Orthopedic Precautions:N/A   Braces: N/A  Respiratory Status: trach collar     Occupational Performance:     Bed Mobility:    ·      Functional Mobility/Transfers:  ·   Functional Mobility:  Activities of Daily Living:   , Attempted to have pt wipe his face with hand over hand assistance but pt was not able to assist .    [facility] 6 Click ADL:      Treatment & Education:  Attempted aarom with pt but pt did not participate,therefore prom performed 10 reps x 2 in all planes. Therapist provided a gentle stretch to B digits to prevent contractures and to maintain good skin integrity,Therapist also  placed b hand rolls in pt's hand to stretch digits and to decrease pt keeping hand in a thigh fisted position.        Patient left HOB elevated with all lines intactEducation:      GOALS:   Multidisciplinary Problems     Occupational Therapy Goals        Problem: Occupational Therapy    Goal Priority Disciplines Outcome Interventions   Occupational Therapy Goal     OT, PT/OT Ongoing, Progressing    Description: Grooming:   Short Term Goal: Pt will perform grooming with mod(A) and full setup  Long Term Goal: Pt will perform grooming with min(A) and full setup    UE Dressing:   Short Term Goal: Pt will perform UE dressing with mod(A) and heavy cues  Long Term Goal: Pt will perform UE dressing with min(A)    Endurance Status:   Short Term Goal:pt to perform 15 min OT treatment with 3 or greater rest breaks  Long Term Goal: pt to perform 25 min min OT treat with 2 or less rest breaks    ROM Status: AROM is severely impaired, PROM is moderately impaired with heavy tone/ resistance  Short Term Goal: Pt will increase AAROM of (B) shoulders to 1/2 range  Long Term Goal: Pt will increase (B) UE AAROM to 2/3 range                         Time Tracking:     OT Date of Treatment: 03/31/22  OT Start Time: 0840  OT Stop Time: 0905  OT Total Time (min): 25 min    Billable Minutes: no charges billed secondary to pt did not particiapte with ex's     OT/PAKO: PAKO          3/31/2022

## 2022-03-31 NOTE — PROGRESS NOTES
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 male with wounds to sacral, left lateral lower leg, right anterior foot, right hand, Left posterior heel, and right lateral and medial foot. Sacral wound is necrotic with moderate amount of green drainage and odor. Wounds on feet and heels continue to have necrotic tissue.          Review of Systems   Unable to perform ROS: Acuity of condition     Objective:     Vital Signs (Most Recent):  Temp: 98.6 °F (37 °C) (03/31/22 1200)  Pulse: 107 (03/31/22 0900)  Resp: 18 (03/31/22 0900)  BP: (!) 114/54 (03/31/22 0900)  SpO2: 100 % (03/31/22 0900) Vital Signs (24h Range):  Temp:  [98.6 °F (37 °C)-100.5 °F (38.1 °C)] 98.6 °F (37 °C)  Pulse:  [] 107  Resp:  [12-38] 18  SpO2:  [92 %-100 %] 100 %  BP: ()/(43-65) 114/54     Weight: 76.5 kg (168 lb 10.4 oz)  Body mass index is 23.52 kg/m².    Physical Exam  Vitals reviewed.   Constitutional:       Appearance: He is ill-appearing.   HENT:      Head: Normocephalic.   Cardiovascular:      Rate and Rhythm: Normal rate and regular rhythm.      Pulses: Normal pulses.      Heart sounds: Normal heart sounds.   Pulmonary:      Comments: Intubated  Skin:     Findings: Erythema present.      Comments: Wound, see wound assessment and pictures   Neurological:      Mental Status: He is alert. Mental status is at baseline.      Motor: Weakness present.         Assessment and Plan      Assessment:  1. Abdominal wound/Skin graft  2. Right thigh donor site  3. Sacral wound  4.  Lower extremity  wounds     Plan:   1. Apply Duoderm to BLE  2.. Pack sacral wound with Vashe twice daily  3. Turn every 2 hours,   4. Keep pressure off sacral and heels.   5. Cultures of sacral today        Signature:  HERSON Viveros  Wound Care    Date of encounter: 4/5/2022

## 2022-03-31 NOTE — PROGRESS NOTES
[facility]  Pulmonology  Progress Note    Patient Name: Og Garcia  MRN: [id number]  Admission Date: 12/23/2021  Hospital Length of Stay: 98 days  Code Status: Prior  Attending Provider: Cecil Abernathy DO  Primary Care Provider: Hector Arndt DNP, FNP-C   Principal Problem: Denice gangrene    Subjective:     Interval History: No acute events overnight. The patient is currently resting comfortably. He is currently afebrile and vital signs are stable.    Objective:     Vital Signs (Most Recent):  Temp: 98.6 °F (37 °C) (03/31/22 1200)  Pulse: 107 (03/31/22 0900)  Resp: 18 (03/31/22 0900)  BP: (!) 114/54 (03/31/22 0900)  SpO2: 100 % (03/31/22 0900)   Vital Signs (24h Range):  Temp:  [98.6 °F (37 °C)-100.5 °F (38.1 °C)] 98.6 °F (37 °C)  Pulse:  [] 107  Resp:  [12-38] 18  SpO2:  [92 %-100 %] 100 %  BP: ()/(43-76) 114/54     Weight: 76.5 kg (168 lb 10.4 oz)  Body mass index is 23.52 kg/m².      Intake/Output Summary (Last 24 hours) at 3/31/2022 1244  Last data filed at 3/31/2022 0600  Gross per 24 hour   Intake 1920 ml   Output 150 ml   Net 1770 ml         Physical Exam  Vitals reviewed.   HENT:      Right Ear: External ear normal.      Left Ear: External ear normal.      Mouth/Throat:      Mouth: Mucous membranes are moist.   Eyes:      Pupils: Pupils are equal, round, and reactive to light.   Cardiovascular:      Rate and Rhythm: Normal rate and regular rhythm.   Pulmonary:      Effort: Pulmonary effort is normal.      Breath sounds: Normal breath sounds.   Abdominal:      Palpations: Abdomen is soft.   Musculoskeletal:         General: Normal range of motion.      Cervical back: Normal range of motion and neck supple.   Skin:     General: Skin is warm and dry.      Capillary Refill: Capillary refill takes less than 2 seconds.   Neurological:      Mental Status: He is alert. Mental status is at baseline.   Psychiatric:         Mood and Affect: Mood normal.        Vents:  Vent Mode: CPAP / PSV (03/30/22 2029)  Set Rate: 14 BPM (03/29/22 0717)  Vt Set: 480 mL (03/29/22 0717)  Pressure Support: 12 cmH20 (03/30/22 2029)  PEEP/CPAP: 5 cmH20 (03/30/22 2029)  Oxygen Concentration (%): 35 (03/31/22 0800)  Peak Airway Pressure: 18 cmH2O (03/30/22 0450)  Plateau Pressure: 20 cmH20 (03/07/22 0500)  Total Ve: 12.3 mL (03/30/22 0815)  F/VT Ratio<105 (RSBI): (!) 48.19 (03/30/22 0450)    Lines/Drains/Airways       Peripherally Inserted Central Catheter Line  Duration             PICC Double Lumen 01/05/22 left basilic 85 days              Central Venous Catheter Line  Duration                  Hemodialysis Catheter 12/30/21 0900 right internal jugular 91 days              Drain  Duration                  Colostomy 12/18/21 1030 Descending/sigmoid  days         Gastrostomy/Enterostomy 03/09/22 1436 Percutaneous endoscopic gastrostomy (PEG) midline feeding 21 days              Airway  Duration                  Surgical Airway 01/04/22 Shiley 86 days                    Significant Labs:    CBC/Anemia Profile:  No results for input(s): WBC, HGB, HCT, PLT, MCV, RDW, IRON, FERRITIN, RETIC, FOLATE, [id number], OCCULTBLOOD in the last 48 hours.     Chemistries:  No results for input(s): NA, K, CL, CO2, BUN, CREATININE, CALCIUM, ALBUMIN, PROT, BILITOT, ALKPHOS, ALT, AST, GLUCOSE, MG, PHOS in the last 48 hours.    All pertinent labs within the past 24 hours have been reviewed.    Significant Imaging:  I have reviewed all pertinent imaging results/findings within the past 24 hours.    Assessment/Plan:     ESRD (end stage renal disease)  Started on HD 12/30   Continue with HD per nephrology      Type 2 diabetes mellitus without complication, without long-term current use of insulin  accuchecks look ok  Continue with current care      On mechanically assisted ventilation  - 12/14 intubated, tracheostomy 1/4    3/28-- increase trach collar to 8 hours BID as tolerated   3/29- He had some  issues with trach collar overnight (increased RR) and was put back on ventilator  I have flipped to cpap during my exam and he is doing well. We will try trach collar again today  3/30- did well with trach collar overnight  3/31- plan to try trach collar continuous as tolerated starting today. He has been doing 8 hours bid    Hypertension  Blood pressure is ok  Continue with current care                   Cecil Abernathy,   Pulmonology  [facility]

## 2022-03-31 NOTE — SUBJECTIVE & OBJECTIVE
Interval History: No acute events overnight. The patient is currently resting comfortably. He is currently afebrile and vital signs are stable.    Objective:     Vital Signs (Most Recent):  Temp: 98.6 °F (37 °C) (03/31/22 1200)  Pulse: 107 (03/31/22 0900)  Resp: 18 (03/31/22 0900)  BP: (!) 114/54 (03/31/22 0900)  SpO2: 100 % (03/31/22 0900)   Vital Signs (24h Range):  Temp:  [98.6 °F (37 °C)-100.5 °F (38.1 °C)] 98.6 °F (37 °C)  Pulse:  [] 107  Resp:  [12-38] 18  SpO2:  [92 %-100 %] 100 %  BP: ()/(43-76) 114/54     Weight: 76.5 kg (168 lb 10.4 oz)  Body mass index is 23.52 kg/m².      Intake/Output Summary (Last 24 hours) at 3/31/2022 1244  Last data filed at 3/31/2022 0600  Gross per 24 hour   Intake 1920 ml   Output 150 ml   Net 1770 ml         Physical Exam  Vitals reviewed.   HENT:      Right Ear: External ear normal.      Left Ear: External ear normal.      Mouth/Throat:      Mouth: Mucous membranes are moist.   Eyes:      Pupils: Pupils are equal, round, and reactive to light.   Cardiovascular:      Rate and Rhythm: Normal rate and regular rhythm.   Pulmonary:      Effort: Pulmonary effort is normal.      Breath sounds: Normal breath sounds.   Abdominal:      Palpations: Abdomen is soft.   Musculoskeletal:         General: Normal range of motion.      Cervical back: Normal range of motion and neck supple.   Skin:     General: Skin is warm and dry.      Capillary Refill: Capillary refill takes less than 2 seconds.   Neurological:      Mental Status: He is alert. Mental status is at baseline.   Psychiatric:         Mood and Affect: Mood normal.       Vents:  Vent Mode: CPAP / PSV (03/30/22 2029)  Set Rate: 14 BPM (03/29/22 0717)  Vt Set: 480 mL (03/29/22 0717)  Pressure Support: 12 cmH20 (03/30/22 2029)  PEEP/CPAP: 5 cmH20 (03/30/22 2029)  Oxygen Concentration (%): 35 (03/31/22 0800)  Peak Airway Pressure: 18 cmH2O (03/30/22 0450)  Plateau Pressure: 20 cmH20 (03/07/22 0500)  Total Ve: 12.3 mL  (03/30/22 0815)  F/VT Ratio<105 (RSBI): (!) 48.19 (03/30/22 0450)    Lines/Drains/Airways       Peripherally Inserted Central Catheter Line  Duration             PICC Double Lumen 01/05/22 left basilic 85 days              Central Venous Catheter Line  Duration                  Hemodialysis Catheter 12/30/21 0900 right internal jugular 91 days              Drain  Duration                  Colostomy 12/18/21 1030 Descending/sigmoid  days         Gastrostomy/Enterostomy 03/09/22 1436 Percutaneous endoscopic gastrostomy (PEG) midline feeding 21 days              Airway  Duration                  Surgical Airway 01/04/22 Shiley 86 days                    Significant Labs:    CBC/Anemia Profile:  No results for input(s): WBC, HGB, HCT, PLT, MCV, RDW, IRON, FERRITIN, RETIC, FOLATE, [id number], OCCULTBLOOD in the last 48 hours.     Chemistries:  No results for input(s): NA, K, CL, CO2, BUN, CREATININE, CALCIUM, ALBUMIN, PROT, BILITOT, ALKPHOS, ALT, AST, GLUCOSE, MG, PHOS in the last 48 hours.    All pertinent labs within the past 24 hours have been reviewed.    Significant Imaging:  I have reviewed all pertinent imaging results/findings within the past 24 hours.

## 2022-03-31 NOTE — ASSESSMENT & PLAN NOTE
- 12/14 intubated, tracheostomy 1/4    3/28-- increase trach collar to 8 hours BID as tolerated   3/29- He had some issues with trach collar overnight (increased RR) and was put back on ventilator  I have flipped to cpap during my exam and he is doing well. We will try trach collar again today  3/30- did well with trach collar overnight  3/31- plan to try trach collar continuous as tolerated starting today. He has been doing 8 hours bid

## 2022-04-01 NOTE — PT/OT/SLP DISCHARGE
Occupational Therapy Discharge Summary    [facility]  MRN: [id number]   Principal Problem: Denice gangrene      Patient Discharged from acute Occupational Therapy on 4/1/22.  Please refer to prior OT note dated 4/1/22 for functional status.    Assessment:      Pt has not made any progress during 1 week trial period.    Objective:     GOALS:   Multidisciplinary Problems     Occupational Therapy Goals        Problem: Occupational Therapy    Goal Priority Disciplines Outcome Interventions   Occupational Therapy Goal     OT, PT/OT Ongoing, Progressing    Description: Grooming:   Short Term Goal: Pt will perform grooming with mod(A) and full setup  Long Term Goal: Pt will perform grooming with min(A) and full setup    UE Dressing:   Short Term Goal: Pt will perform UE dressing with mod(A) and heavy cues  Long Term Goal: Pt will perform UE dressing with min(A)    Endurance Status:   Short Term Goal:pt to perform 15 min OT treatment with 3 or greater rest breaks  Long Term Goal: pt to perform 25 min min OT treat with 2 or less rest breaks    ROM Status: AROM is severely impaired, PROM is moderately impaired with heavy tone/ resistance  Short Term Goal: Pt will increase AAROM of (B) shoulders to 1/2 range  Long Term Goal: Pt will increase (B) UE AAROM to 2/3 range                         Reasons for Discontinuation of Therapy Services  Therapist determines that the patient will no longer benefit from therapy services due to being unable to follow commands      Plan:     Patient Discharged to: Pt remains in facility, but is being d/c from skilled OT services    4/1/2022

## 2022-04-01 NOTE — PLAN OF CARE
Patient is on going progressing will continue to monitor.   Problem: Adult Inpatient Plan of Care  Goal: Plan of Care Review  Outcome: Ongoing, Progressing  Flowsheets (Taken 4/1/2022 2239)  Plan of Care Reviewed With: patient  Goal: Patient-Specific Goal (Individualized)  Outcome: Ongoing, Progressing

## 2022-04-01 NOTE — PROGRESS NOTES
[facility]  Nephrology  Progress Note    Patient Name: Og Garcia  MRN: [id number]  Admission Date: 12/23/2021  Hospital Length of Stay: 98 days  Attending Provider: Cecil Abernathy DO   Primary Care Physician: Hector Arndt DNP, FNP-C  Principal Problem:Denice gangrene    Subjective:     HPI: The patient is known from the previous nephrology consult at [facility].  He is no at Specialty and continues to need dialysis support.      Interval History: The patient continues to do well.  He is tolerating trach collar.    Review of patient's allergies indicates:  No Known Allergies  Current Facility-Administered Medications   Medication Frequency    0.9%  NaCl infusion PRN    acetaminophen tablet 500 mg Q6H PRN    acetic acid 0.25 % irrigation PRN    albuterol nebulizer solution 2.5 mg Q4H PRN    albuterol-ipratropium 2.5 mg-0.5 mg/3 mL nebulizer solution 3 mL Q6H    bisacodyL EC tablet 10 mg Daily PRN    calcium gluconate 1 g in dextrose 5 % in water (D5W) 5 % 100 mL IVPB (MB+) Once    dextrose 50% injection 12.5 g PRN    diltiaZEM tablet 90 mg Q6H    fentaNYL 25 mcg/hr 1 patch Q72H    glucagon (human recombinant) injection 1 mg PRN    guaiFENesin 100 mg/5 ml syrup 200 mg Q6H    heparin (porcine) injection 4,000 Units PRN    heparin (porcine) injection 5,000 Units Q8H    hydrALAZINE tablet 25 mg Q8H    insulin aspart U-100 injection 1-10 Units Q6H    insulin detemir U-100 injection 25 Units QHS    ondansetron injection 8 mg Q6H PRN    pantoprazole suspension 40 mg Daily    predniSONE tablet 10 mg Daily    sevelamer carbonate pwpk 0.8 g TID WM    silver sulfADIAZINE 1% cream Daily PRN    simethicone chewable tablet 80 mg TID PRN       Objective:     Vital Signs (Most Recent):  Temp: 98.3 °F (36.8 °C) (03/31/22 1600)  Pulse: (!) 111 (03/31/22 1946)  Resp: (!) 40 (03/31/22 1946)  BP: 131/65 (03/31/22 1600)  SpO2: 100 % (03/31/22 1946)  O2 Device (Oxygen Therapy): Trach  Collar (03/31/22 1322)   Vital Signs (24h Range):  Temp:  [98.3 °F (36.8 °C)-100 °F (37.8 °C)] 98.3 °F (36.8 °C)  Pulse:  [] 111  Resp:  [12-43] 40  SpO2:  [94 %-100 %] 100 %  BP: ()/(47-74) 131/65     Weight: 76.5 kg (168 lb 10.4 oz) (03/31/22 0500)  Body mass index is 23.52 kg/m².  Body surface area is 1.96 meters squared.    I/O last 3 completed shifts:  In: 2940 [NG/GT:2940]  Out: 2650 [Other:2500; Stool:150]    Physical Exam  Vitals reviewed.   HENT:      Head: Atraumatic.      Mouth/Throat:      Mouth: Mucous membranes are moist.   Cardiovascular:      Rate and Rhythm: Regular rhythm.   Pulmonary:      Effort: Pulmonary effort is normal.      Breath sounds: Normal breath sounds.   Abdominal:      General: Abdomen is flat.   Skin:     General: Skin is warm.   Neurological:      Mental Status: He is alert.   Psychiatric:         Mood and Affect: Mood normal.       Significant Labs:  BMP: No results for input(s): GLU, NA, K, CL, CO2, BUN, CREATININE, CALCIUM, MG in the last 168 hours.  CBC: No results for input(s): WBC, RBC, HGB, HCT, PLT, MCV, MCH, MCHC in the last 168 hours.     Significant Imaging:  Labs: Reviewed    Assessment/Plan:     ESRD (end stage renal disease)  3/22/2022 Continue with dialysis as scheduled.  3/24/2022 The patient continues to do well with dialysis.  3/25/2022  Continue with dialysis as scheduled.  3/28/2022 Dialysis as scheduled.  3/29/2022  Continue with dialysis schedule  3/30/2022  At this time, continue with dialysis as scheduled.  3/31/2022  Dialysis sessions go well.  Continue with this therapy.        Thank you for your consult. I will follow-up with patient. Please contact us if you have any additional questions.    Edwin Pryor Jr, MD  Nephrology  [facility] [place] [facility]\""

## 2022-04-01 NOTE — ASSESSMENT & PLAN NOTE
- s/p multiple debridements  - wound vac in place  - ID consulted for antibiotic management: He was treated with rocephin, daptomycin, clindamycin. 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 Cazares  - 2/21 back to OR for skin grafting  - 3/9 had peg tube placed

## 2022-04-01 NOTE — SUBJECTIVE & OBJECTIVE
Interval History: Having low grade fevers over past couple of days. On PSV this am. Was having reported apnea spells with trach collar.     Objective:     Vital Signs (Most Recent):  Temp: 98.3 °F (36.8 °C) (04/01/22 0745)  Pulse: 102 (04/01/22 0748)  Resp: 20 (04/01/22 0748)  BP: (!) 126/58 (04/01/22 0400)  SpO2: 100 % (04/01/22 0748)   Vital Signs (24h Range):  Temp:  [98.3 °F (36.8 °C)-100.7 °F (38.2 °C)] 98.3 °F (36.8 °C)  Pulse:  [] 102  Resp:  [12-44] 20  SpO2:  [95 %-100 %] 100 %  BP: (111-153)/(54-74) 126/58     Weight: 76.5 kg (168 lb 10.4 oz)  Body mass index is 23.52 kg/m².      Intake/Output Summary (Last 24 hours) at 4/1/2022 0846  Last data filed at 3/31/2022 2150  Gross per 24 hour   Intake 1110 ml   Output --   Net 1110 ml       Physical Exam  Vitals reviewed.   Constitutional:       General: He is not in acute distress.  HENT:      Head:      Comments: Right temporal deformity     Nose: Nose normal.      Mouth/Throat:      Mouth: Mucous membranes are moist.   Eyes:      Comments: enucleated right eye   Neck:      Comments: Tracheostomy in place  Cardiovascular:      Rate and Rhythm: Normal rate and regular rhythm.      Pulses: Normal pulses.      Heart sounds: Normal heart sounds.   Pulmonary:      Effort: No respiratory distress.      Breath sounds: No stridor. No wheezing, rhonchi or rales.      Comments: Ventilator driven breath sounds bilaterally. Coarse  Abdominal:      Palpations: Abdomen is soft.      Tenderness: There is no guarding.      Comments: PEG   Genitourinary:     Comments: Packing in place  Musculoskeletal:         General: No swelling or deformity.      Cervical back: Neck supple.      Right lower leg: No edema.      Left lower leg: No edema.      Comments: Thenar wasting. RUE PICC malpositioned   Lymphadenopathy:      Cervical: No cervical adenopathy.   Skin:     General: Skin is warm and dry.   Neurological:      Mental Status: He is alert.      Cranial Nerves: No cranial  nerve deficit.      Motor: Weakness present.      Comments: Doesn't really follow commands for me this am       Vents:  Vent Mode: CPAP / PSV (04/01/22 0748)  Set Rate: 14 BPM (03/29/22 0717)  Vt Set: 480 mL (03/29/22 0717)  Pressure Support: 12 cmH20 (03/31/22 2305)  PEEP/CPAP: 5 cmH20 (04/01/22 0748)  Oxygen Concentration (%): 35 (04/01/22 0748)  Peak Airway Pressure: 17 cmH2O (04/01/22 0748)  Plateau Pressure: 20 cmH20 (03/07/22 0500)  Total Ve: 10.2 mL (04/01/22 0748)  F/VT Ratio<105 (RSBI): (!) 45.05 (04/01/22 0748)    Lines/Drains/Airways       Peripherally Inserted Central Catheter Line  Duration             PICC Double Lumen 01/05/22 left basilic 86 days              Central Venous Catheter Line  Duration                  Hemodialysis Catheter 12/30/21 0900 right internal jugular 91 days              Drain  Duration                  Colostomy 12/18/21 1030 Descending/sigmoid  days         Gastrostomy/Enterostomy 03/09/22 1436 Percutaneous endoscopic gastrostomy (PEG) midline feeding 22 days              Airway  Duration                  Surgical Airway 01/04/22 Shiley 87 days                    Significant Labs:    CBC/Anemia Profile:  Recent Labs   Lab 04/01/22  0721   WBC 20.55*   HGB 7.3*   HCT 22.0*      MCV 89.8   RDW 14.6*        Chemistries:  No results for input(s): NA, K, CL, CO2, BUN, CREATININE, CALCIUM, ALBUMIN, PROT, BILITOT, ALKPHOS, ALT, AST, GLUCOSE, MG, PHOS in the last 48 hours.    All pertinent labs within the past 24 hours have been reviewed.    Significant Imaging:  I have reviewed all pertinent imaging results/findings within the past 24 hours.

## 2022-04-01 NOTE — DIALYSIS ROUNDING
"          Nephrology Department            NAME: Og Garcia   YOB: 1955  MRN: [id number]  NOTE DATE: 04/01/2022       Seen on dialysis.  He is tolerating the procedure.    Patient complains:  None.      REVIEW OF SYSTEMS:  he reports no shortness of breath, nausea or vomiting. No acute changes.    VITALS:  height is 5' 11" (1.803 m) and weight is 76.5 kg (168 lb 10.4 oz). His axillary temperature is 98.7 °F (37.1 °C). His blood pressure is 113/58 (abnormal) and his pulse is 106. His respiration is 39 (abnormal) and oxygen saturation is 100%.  Hemodialysis documentation flowsheet reviewed with dialysis nurse, including weight and vitals.    NAD.  Respiration unlabored. Lungs clear.  Heart regular, no rub.  Abdomen soft, nontender, positive bowl sounds  No edema.    No results for input(s): NA, K, CL, CO2, BUN, CREATININE, GLUCOSE, CALCIUM, PHOS in the last 168 hours.    Invalid input(s): EGFR, LABALBU  Recent Labs   Lab 04/01/22  0721   WBC 20.55*   HGB 7.3*   HCT 22.0*          ASSESSMENT/PLAN:  Continue with scheduled hemodialysis for this patient.    Edwin Pryor Jr, MD  "

## 2022-04-01 NOTE — PROGRESS NOTES
[facility]  Pulmonology  Progress Note    Patient Name: Og Garcia  MRN: [id number]  Admission Date: 12/23/2021  Hospital Length of Stay: 99 days  Code Status: Prior  Attending Provider: Cecil Abernathy DO  Primary Care Provider: Hector Arndt DNP, FNP-C   Principal Problem: Denice gangrene    Subjective:     Interval History: Having low grade fevers over past couple of days. On PSV this am. Was having reported apnea spells with trach collar.     Objective:     Vital Signs (Most Recent):  Temp: 98.3 °F (36.8 °C) (04/01/22 0745)  Pulse: 102 (04/01/22 0748)  Resp: 20 (04/01/22 0748)  BP: (!) 126/58 (04/01/22 0400)  SpO2: 100 % (04/01/22 0748)   Vital Signs (24h Range):  Temp:  [98.3 °F (36.8 °C)-100.7 °F (38.2 °C)] 98.3 °F (36.8 °C)  Pulse:  [] 102  Resp:  [12-44] 20  SpO2:  [95 %-100 %] 100 %  BP: (111-153)/(54-74) 126/58     Weight: 76.5 kg (168 lb 10.4 oz)  Body mass index is 23.52 kg/m².      Intake/Output Summary (Last 24 hours) at 4/1/2022 0846  Last data filed at 3/31/2022 2150  Gross per 24 hour   Intake 1110 ml   Output --   Net 1110 ml       Physical Exam  Vitals reviewed.   Constitutional:       General: He is not in acute distress.  HENT:      Head:      Comments: Right temporal deformity     Nose: Nose normal.      Mouth/Throat:      Mouth: Mucous membranes are moist.   Eyes:      Comments: enucleated right eye   Neck:      Comments: Tracheostomy in place  Cardiovascular:      Rate and Rhythm: Normal rate and regular rhythm.      Pulses: Normal pulses.      Heart sounds: Normal heart sounds.   Pulmonary:      Effort: No respiratory distress.      Breath sounds: No stridor. No wheezing, rhonchi or rales.      Comments: Ventilator driven breath sounds bilaterally. Coarse  Abdominal:      Palpations: Abdomen is soft.      Tenderness: There is no guarding.      Comments: PEG   Genitourinary:     Comments: Packing in place  Musculoskeletal:         General: No  swelling or deformity.      Cervical back: Neck supple.      Right lower leg: No edema.      Left lower leg: No edema.      Comments: Thenar wasting. RUE PICC malpositioned   Lymphadenopathy:      Cervical: No cervical adenopathy.   Skin:     General: Skin is warm and dry.   Neurological:      Mental Status: He is alert.      Cranial Nerves: No cranial nerve deficit.      Motor: Weakness present.      Comments: Doesn't really follow commands for me this am       Vents:  Vent Mode: CPAP / PSV (04/01/22 0748)  Set Rate: 14 BPM (03/29/22 0717)  Vt Set: 480 mL (03/29/22 0717)  Pressure Support: 12 cmH20 (03/31/22 2305)  PEEP/CPAP: 5 cmH20 (04/01/22 0748)  Oxygen Concentration (%): 35 (04/01/22 0748)  Peak Airway Pressure: 17 cmH2O (04/01/22 0748)  Plateau Pressure: 20 cmH20 (03/07/22 0500)  Total Ve: 10.2 mL (04/01/22 0748)  F/VT Ratio<105 (RSBI): (!) 45.05 (04/01/22 0748)    Lines/Drains/Airways       Peripherally Inserted Central Catheter Line  Duration             PICC Double Lumen 01/05/22 left basilic 86 days              Central Venous Catheter Line  Duration                  Hemodialysis Catheter 12/30/21 0900 right internal jugular 91 days              Drain  Duration                  Colostomy 12/18/21 1030 Descending/sigmoid  days         Gastrostomy/Enterostomy 03/09/22 1436 Percutaneous endoscopic gastrostomy (PEG) midline feeding 22 days              Airway  Duration                  Surgical Airway 01/04/22 Shiley 87 days                    Significant Labs:    CBC/Anemia Profile:  Recent Labs   Lab 04/01/22 0721   WBC 20.55*   HGB 7.3*   HCT 22.0*      MCV 89.8   RDW 14.6*        Chemistries:  No results for input(s): NA, K, CL, CO2, BUN, CREATININE, CALCIUM, ALBUMIN, PROT, BILITOT, ALKPHOS, ALT, AST, GLUCOSE, MG, PHOS in the last 48 hours.    All pertinent labs within the past 24 hours have been reviewed.    Significant Imaging:  I have reviewed all pertinent imaging results/findings  within the past 24 hours.    Assessment/Plan:     * Denice gangrene  - s/p multiple debridements  - wound vac in place  - ID consulted for antibiotic management: He was treated with rocephin, daptomycin, clindamycin. 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 Cazares  - 2/21 back to OR for skin grafting  - 3/9 had peg tube placed       Fever  2/28 - resp culture with pseudomonas and klebsiella  treat with zosyn for 7 days   Blood culture from 2/25 also growing Pseudomonas. Sensitive to zosyn. Will need to continue zosyn for at least 10 days total  - febrile since 3/28, check CXR and repeat CBC  - remove malpositioned LUE PICC  - blood cultures today    ESRD (end stage renal disease)  Started on HD 12/30   Continue with HD per nephrology      Type 2 diabetes mellitus without complication, without long-term current use of insulin  - accuchecks look ok  - Continue with current care      On mechanically assisted ventilation  - 12/14 intubated, tracheostomy 1/4  - plan to try trach collar continuous as tolerated starting today. He has been doing 8 hours bid  - currently on PSV    Hypertension  Blood pressure is ok  Continue with current care                   Raul Hall MD  Pulmonology  [facility]

## 2022-04-01 NOTE — ASSESSMENT & PLAN NOTE
3/22/2022 Continue with dialysis as scheduled.  3/24/2022 The patient continues to do well with dialysis.  3/25/2022  Continue with dialysis as scheduled.  3/28/2022 Dialysis as scheduled.  3/29/2022  Continue with dialysis schedule  3/30/2022  At this time, continue with dialysis as scheduled.  3/31/2022  Dialysis sessions go well.  Continue with this therapy.

## 2022-04-01 NOTE — PT/OT/SLP PROGRESS
Occupational Therapy   Treatment    Name: Og Garcia  MRN: [id number]  Admitting Diagnosis:  Denice gangrene       Recommendations:     Discharge Recommendations: nursing facility, basic, intermediate care facility/nursing home  Discharge Equipment Recommendations:   (to be determined)  Barriers to discharge:       Assessment:     Og Garcia is a 66 y.o. male with a medical diagnosis of Denice gangrene.  Performance deficits affecting function are weakness, impaired endurance, impaired cognition, impaired fine motor, impaired joint extensibility.     Rehab Prognosis:  Poor; patient would benefit from acute skilled OT services to address these deficits and reach maximum level of function.       Plan:     Patient to be seen 5 x/week to address the above listed problems via self-care/home management, therapeutic activities, therapeutic exercises  · Plan of Care Expires: 04/01/22  · Plan of Care Reviewed with: patient    Subjective     Pain/Comfort:  · Pain Rating 1: 0/10    Objective:     Communicated with: STEVEN Crook prior to session.  Patient found supine with blood pressure cuff, telemetry, Tracheostomy, pulse ox (continuous), peripheral IV upon OT entry to room.    General Precautions: Standard, vision impaired   Orthopedic Precautions:N/A   Braces: N/A  Respiratory Status: trach collar  Occupational Performance:     Bed Mobility:    ·     Functional Mobility/Transfers:  ·   Functional Mobility  Activities of Daily Living:  ·       [facility] 6 Click ADL:      Treatment & Education:  Pt not following commands or participating with therapy therefore, PROM performed to b ue 10 reps x 2 all planes. Pt's 1 week trail ended today and recommend d/c from OT services  Patient left supine with all lines intactEducation:      GOALS:   Multidisciplinary Problems     Occupational Therapy Goals        Problem: Occupational Therapy    Goal Priority Disciplines Outcome Interventions   Occupational Therapy Goal      OT, PT/OT Ongoing, Progressing    Description: Grooming:   Short Term Goal: Pt will perform grooming with mod(A) and full setup  Long Term Goal: Pt will perform grooming with min(A) and full setup    UE Dressing:   Short Term Goal: Pt will perform UE dressing with mod(A) and heavy cues  Long Term Goal: Pt will perform UE dressing with min(A)    Endurance Status:   Short Term Goal:pt to perform 15 min OT treatment with 3 or greater rest breaks  Long Term Goal: pt to perform 25 min min OT treat with 2 or less rest breaks    ROM Status: AROM is severely impaired, PROM is moderately impaired with heavy tone/ resistance  Short Term Goal: Pt will increase AAROM of (B) shoulders to 1/2 range  Long Term Goal: Pt will increase (B) UE AAROM to 2/3 range                         Time Tracking:     OT Date of Treatment: 04/01/22  OT Start Time: 1419  OT Stop Time: 1440  OT Total Time (min): 21 min    Billable Minutes no charges billed today secondary to pt not following commands and pt not  participating with therapy  OT/PAKO: PAKO          4/1/2022

## 2022-04-01 NOTE — ASSESSMENT & PLAN NOTE
- 12/14 intubated, tracheostomy 1/4  - plan to try trach collar continuous as tolerated starting today. He has been doing 8 hours bid  - currently on PSV

## 2022-04-01 NOTE — ASSESSMENT & PLAN NOTE
2/28 - resp culture with pseudomonas and klebsiella  treat with zosyn for 7 days   Blood culture from 2/25 also growing Pseudomonas. Sensitive to zosyn. Will need to continue zosyn for at least 10 days total  - febrile since 3/28, check CXR and repeat CBC  - remove malpositioned LUE PICC  - blood cultures today

## 2022-04-01 NOTE — SUBJECTIVE & OBJECTIVE
Interval History: The patient continues to do well.  He is tolerating trach collar.    Review of patient's allergies indicates:  No Known Allergies  Current Facility-Administered Medications   Medication Frequency    0.9%  NaCl infusion PRN    acetaminophen tablet 500 mg Q6H PRN    acetic acid 0.25 % irrigation PRN    albuterol nebulizer solution 2.5 mg Q4H PRN    albuterol-ipratropium 2.5 mg-0.5 mg/3 mL nebulizer solution 3 mL Q6H    bisacodyL EC tablet 10 mg Daily PRN    calcium gluconate 1 g in dextrose 5 % in water (D5W) 5 % 100 mL IVPB (MB+) Once    dextrose 50% injection 12.5 g PRN    diltiaZEM tablet 90 mg Q6H    fentaNYL 25 mcg/hr 1 patch Q72H    glucagon (human recombinant) injection 1 mg PRN    guaiFENesin 100 mg/5 ml syrup 200 mg Q6H    heparin (porcine) injection 4,000 Units PRN    heparin (porcine) injection 5,000 Units Q8H    hydrALAZINE tablet 25 mg Q8H    insulin aspart U-100 injection 1-10 Units Q6H    insulin detemir U-100 injection 25 Units QHS    ondansetron injection 8 mg Q6H PRN    pantoprazole suspension 40 mg Daily    predniSONE tablet 10 mg Daily    sevelamer carbonate pwpk 0.8 g TID WM    silver sulfADIAZINE 1% cream Daily PRN    simethicone chewable tablet 80 mg TID PRN       Objective:     Vital Signs (Most Recent):  Temp: 98.3 °F (36.8 °C) (03/31/22 1600)  Pulse: (!) 111 (03/31/22 1946)  Resp: (!) 40 (03/31/22 1946)  BP: 131/65 (03/31/22 1600)  SpO2: 100 % (03/31/22 1946)  O2 Device (Oxygen Therapy): Trach Collar (03/31/22 1322)   Vital Signs (24h Range):  Temp:  [98.3 °F (36.8 °C)-100 °F (37.8 °C)] 98.3 °F (36.8 °C)  Pulse:  [] 111  Resp:  [12-43] 40  SpO2:  [94 %-100 %] 100 %  BP: ()/(47-74) 131/65     Weight: 76.5 kg (168 lb 10.4 oz) (03/31/22 0500)  Body mass index is 23.52 kg/m².  Body surface area is 1.96 meters squared.    I/O last 3 completed shifts:  In: 2940 [NG/GT:2940]  Out: 2650 [Other:2500; Stool:150]    Physical Exam  Vitals reviewed.   HENT:      Head:  Atraumatic.      Mouth/Throat:      Mouth: Mucous membranes are moist.   Cardiovascular:      Rate and Rhythm: Regular rhythm.   Pulmonary:      Effort: Pulmonary effort is normal.      Breath sounds: Normal breath sounds.   Abdominal:      General: Abdomen is flat.   Skin:     General: Skin is warm.   Neurological:      Mental Status: He is alert.   Psychiatric:         Mood and Affect: Mood normal.       Significant Labs:  BMP: No results for input(s): GLU, NA, K, CL, CO2, BUN, CREATININE, CALCIUM, MG in the last 168 hours.  CBC: No results for input(s): WBC, RBC, HGB, HCT, PLT, MCV, MCH, MCHC in the last 168 hours.     Significant Imaging:  Labs: Reviewed

## 2022-04-02 NOTE — NURSING
Received pt lying in bed resting.  No noted distress. Skin warm to touch. Respiration even and unlabored. #8LPC  trach midline and intact with no complications at this time. Pt on AC 14/480/5/35%.  Peg tube intact and secure with Nepro 1.8 @ 60 ml/hr. Fetanyl to left arm intact and secure. Dressing to a sacral and bilateral feet. Abdomen and right thigh open to air. Right hemodialysis catheter intact and secure. Colostomy intact and secure with no complications. SCD hose on and in use. Safety measures ongoing.    2055 Respiratory placed pt on CPAP 12/5/35%. Pt lying in bed resting. No noted distress.    2225 Pt placed on trach collar per respiratory.  No noted distress. Lying in bed resting. Safety measures ongoing.    0130 Pt received bath at this time. No acute  changes or distress. Safety measures ongoing.    0330 Lying in bed resting. No acute changes or distress. Pt on trach collar 35% tolerating well at this time. Safety measures ongoing.    0656 Fetanyl  Patch removed from left shoulder per order to discontinue. Pt suction at this time. Pt been suction throughout the shift multiple times per nursing staff and respiratory with thick secretions observed. Safety measures ongoing.

## 2022-04-02 NOTE — ASSESSMENT & PLAN NOTE
2/28 - resp culture with pseudomonas and klebsiella  treat with zosyn for 7 days   Blood culture from 2/25 also growing Pseudomonas. Sensitive to zosyn. Will need to continue zosyn for at least 10 days total  - low grade fever since 3/28, CXR- unchanged and repeat CBC- WBC 20.57  - remove malpositioned LUE PICC. Fevers have resolved since 4/1  - blood cultures- pending  - does have areas of skin breakdown and some drainage from left heel wound  - if fever again, will add empiric abx for wound infections

## 2022-04-02 NOTE — PROGRESS NOTES
[facility]  Pulmonology  Progress Note    Patient Name: Og Garcia  MRN: [id number]  Admission Date: 12/23/2021  Hospital Length of Stay: 100 days  Code Status: Prior  Attending Provider: Cecil Abernathy DO  Primary Care Provider: Hector Arndt DNP, FNP-C   Principal Problem: Denice gangrene    Subjective:     Interval History: No further fevers after removal of PICC. On trach collar this am.     Objective:     Vital Signs (Most Recent):  Temp: 99.1 °F (37.3 °C) (04/02/22 0800)  Pulse: 98 (04/02/22 0800)  Resp: (!) 32 (04/02/22 0800)  BP: (!) 137/55 (04/02/22 0800)  SpO2: 100 % (04/02/22 0800)   Vital Signs (24h Range):  Temp:  [98.1 °F (36.7 °C)-99.7 °F (37.6 °C)] 99.1 °F (37.3 °C)  Pulse:  [] 98  Resp:  [13-51] 32  SpO2:  [30 %-100 %] 100 %  BP: (102-146)/(47-70) 137/55     Weight: 72.7 kg (160 lb 4.4 oz)  Body mass index is 22.35 kg/m².      Intake/Output Summary (Last 24 hours) at 4/2/2022 0836  Last data filed at 4/2/2022 0010  Gross per 24 hour   Intake 320 ml   Output --   Net 320 ml       Physical Exam  Vitals reviewed.   Constitutional:       General: He is not in acute distress.     Appearance: He is ill-appearing.   HENT:      Head:      Comments: Right temporal deformity     Nose: Nose normal.      Mouth/Throat:      Mouth: Mucous membranes are moist.   Eyes:      Comments: enucleated right eye   Neck:      Comments: Tracheostomy in place  Cardiovascular:      Rate and Rhythm: Normal rate and regular rhythm.      Pulses: Normal pulses.      Heart sounds: Normal heart sounds.   Pulmonary:      Effort: No respiratory distress.      Breath sounds: No stridor. No wheezing, rhonchi or rales.      Comments: Coarse sounds bilaterally  Abdominal:      Palpations: Abdomen is soft.      Tenderness: There is no guarding.      Comments: PEG   Musculoskeletal:         General: No swelling or deformity.      Cervical back: Neck supple.      Right lower leg: No edema.       Left lower leg: No edema.      Comments: Thenar wasting. Contraction of UEs   Lymphadenopathy:      Cervical: No cervical adenopathy.   Skin:     General: Skin is warm and dry.   Neurological:      Mental Status: He is alert.      Cranial Nerves: No cranial nerve deficit.      Motor: Weakness present.      Comments: Doesn't really follow commands for me this am       Vents:  Vent Mode: Standby (04/02/22 0434)  Set Rate: 14 BPM (04/01/22 1948)  Vt Set: 480 mL (04/01/22 1948)  Pressure Support: 12 cmH20 (04/01/22 2055)  PEEP/CPAP: 5 cmH20 (04/01/22 2055)  Oxygen Concentration (%): 35 (04/02/22 0739)  Peak Airway Pressure: 18 cmH2O (04/01/22 2055)  Plateau Pressure: 20 cmH20 (03/07/22 0500)  Total Ve: 9.8 mL (04/01/22 2055)  F/VT Ratio<105 (RSBI): (!) 59.7 (04/01/22 2055)    Lines/Drains/Airways       Central Venous Catheter Line  Duration                  Hemodialysis Catheter 12/30/21 0900 right internal jugular 92 days              Drain  Duration                  Colostomy 12/18/21 1030 Descending/sigmoid  days         Gastrostomy/Enterostomy 03/09/22 1436 Percutaneous endoscopic gastrostomy (PEG) midline feeding 23 days              Airway  Duration                  Surgical Airway 01/04/22 Shiley 88 days                    Significant Labs:    CBC/Anemia Profile:  Recent Labs   Lab 04/01/22  0721   WBC 20.55*   HGB 7.3*   HCT 22.0*      MCV 89.8   RDW 14.6*        Chemistries:  No results for input(s): NA, K, CL, CO2, BUN, CREATININE, CALCIUM, ALBUMIN, PROT, BILITOT, ALKPHOS, ALT, AST, GLUCOSE, MG, PHOS in the last 48 hours.    None    Significant Imaging:  I have reviewed and interpreted all pertinent imaging results/findings within the past 24 hours.    Assessment/Plan:     * Denice gangrene  - s/p multiple debridements  - wound vac in place  - ID consulted for antibiotic management: He was treated with rocephin, daptomycin, clindamycin. 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 Cazares  - 2/21 back to OR for skin grafting  - 3/9 had peg tube placed       Fever  2/28 - resp culture with pseudomonas and klebsiella  treat with zosyn for 7 days   Blood culture from 2/25 also growing Pseudomonas. Sensitive to zosyn. Will need to continue zosyn for at least 10 days total  - low grade fever since 3/28, CXR- unchanged and repeat CBC- WBC 20.57  - remove malpositioned LUE PICC. Fevers have resolved since 4/1  - blood cultures- pending  - does have areas of skin breakdown and some drainage from left heel wound  - if fever again, will add empiric abx for wound infections    ESRD (end stage renal disease)  Started on HD 12/30   Continue with HD per nephrology      Type 2 diabetes mellitus without complication, without long-term current use of insulin  - accuchecks look ok  - Continue with current care      On mechanically assisted ventilation  - 12/14 intubated, tracheostomy 1/4  - on continuous trach collar    Hypertension  Blood pressure is ok  Continue with current care                   Raul Hall MD  Pulmonology  [facility]

## 2022-04-02 NOTE — SUBJECTIVE & OBJECTIVE
Interval History: No further fevers after removal of PICC. On trach collar this am.     Objective:     Vital Signs (Most Recent):  Temp: 99.1 °F (37.3 °C) (04/02/22 0800)  Pulse: 98 (04/02/22 0800)  Resp: (!) 32 (04/02/22 0800)  BP: (!) 137/55 (04/02/22 0800)  SpO2: 100 % (04/02/22 0800)   Vital Signs (24h Range):  Temp:  [98.1 °F (36.7 °C)-99.7 °F (37.6 °C)] 99.1 °F (37.3 °C)  Pulse:  [] 98  Resp:  [13-51] 32  SpO2:  [30 %-100 %] 100 %  BP: (102-146)/(47-70) 137/55     Weight: 72.7 kg (160 lb 4.4 oz)  Body mass index is 22.35 kg/m².      Intake/Output Summary (Last 24 hours) at 4/2/2022 0836  Last data filed at 4/2/2022 0010  Gross per 24 hour   Intake 320 ml   Output --   Net 320 ml       Physical Exam  Vitals reviewed.   Constitutional:       General: He is not in acute distress.     Appearance: He is ill-appearing.   HENT:      Head:      Comments: Right temporal deformity     Nose: Nose normal.      Mouth/Throat:      Mouth: Mucous membranes are moist.   Eyes:      Comments: enucleated right eye   Neck:      Comments: Tracheostomy in place  Cardiovascular:      Rate and Rhythm: Normal rate and regular rhythm.      Pulses: Normal pulses.      Heart sounds: Normal heart sounds.   Pulmonary:      Effort: No respiratory distress.      Breath sounds: No stridor. No wheezing, rhonchi or rales.      Comments: Coarse sounds bilaterally  Abdominal:      Palpations: Abdomen is soft.      Tenderness: There is no guarding.      Comments: PEG   Musculoskeletal:         General: No swelling or deformity.      Cervical back: Neck supple.      Right lower leg: No edema.      Left lower leg: No edema.      Comments: Thenar wasting. Contraction of UEs   Lymphadenopathy:      Cervical: No cervical adenopathy.   Skin:     General: Skin is warm and dry.   Neurological:      Mental Status: He is alert.      Cranial Nerves: No cranial nerve deficit.      Motor: Weakness present.      Comments: Doesn't really follow commands for  me this am       Vents:  Vent Mode: Standby (04/02/22 0434)  Set Rate: 14 BPM (04/01/22 1948)  Vt Set: 480 mL (04/01/22 1948)  Pressure Support: 12 cmH20 (04/01/22 2055)  PEEP/CPAP: 5 cmH20 (04/01/22 2055)  Oxygen Concentration (%): 35 (04/02/22 0739)  Peak Airway Pressure: 18 cmH2O (04/01/22 2055)  Plateau Pressure: 20 cmH20 (03/07/22 0500)  Total Ve: 9.8 mL (04/01/22 2055)  F/VT Ratio<105 (RSBI): (!) 59.7 (04/01/22 2055)    Lines/Drains/Airways       Central Venous Catheter Line  Duration                  Hemodialysis Catheter 12/30/21 0900 right internal jugular 92 days              Drain  Duration                  Colostomy 12/18/21 1030 Descending/sigmoid  days         Gastrostomy/Enterostomy 03/09/22 1436 Percutaneous endoscopic gastrostomy (PEG) midline feeding 23 days              Airway  Duration                  Surgical Airway 01/04/22 Shiley 88 days                    Significant Labs:    CBC/Anemia Profile:  Recent Labs   Lab 04/01/22  0721   WBC 20.55*   HGB 7.3*   HCT 22.0*      MCV 89.8   RDW 14.6*        Chemistries:  No results for input(s): NA, K, CL, CO2, BUN, CREATININE, CALCIUM, ALBUMIN, PROT, BILITOT, ALKPHOS, ALT, AST, GLUCOSE, MG, PHOS in the last 48 hours.    None    Significant Imaging:  I have reviewed and interpreted all pertinent imaging results/findings within the past 24 hours.

## 2022-04-02 NOTE — PROGRESS NOTES
[facility]  Nephrology  Progress Note    Patient Name: Og Garcia  MRN: [id number]  Admission Date: 12/23/2021  Hospital Length of Stay: 100 days  Attending Provider: Cecil Abernathy DO   Primary Care Physician: Hector Arndt, JULISSA, FNP-C  Principal Problem:Denice gangrene    Consults  Subjective:     Interval History: F/U renal failure. Continues with scheduled dialysis. Interacts very little    Review of patient's allergies indicates:  No Known Allergies  Current Facility-Administered Medications   Medication Frequency    0.9%  NaCl infusion PRN    acetaminophen tablet 500 mg Q6H PRN    acetic acid 0.25 % irrigation PRN    albuterol nebulizer solution 2.5 mg Q4H PRN    albuterol-ipratropium 2.5 mg-0.5 mg/3 mL nebulizer solution 3 mL Q6H    bisacodyL EC tablet 10 mg Daily PRN    calcium gluconate 1 g in dextrose 5 % in water (D5W) 5 % 100 mL IVPB (MB+) Once    dextrose 50% injection 12.5 g PRN    diltiaZEM tablet 90 mg Q6H    glucagon (human recombinant) injection 1 mg PRN    guaiFENesin 100 mg/5 ml syrup 200 mg Q6H    heparin (porcine) injection 4,000 Units PRN    heparin (porcine) injection 5,000 Units Q8H    hydrALAZINE tablet 25 mg Q8H    insulin aspart U-100 injection 1-10 Units Q6H    insulin detemir U-100 injection 25 Units QHS    ondansetron injection 8 mg Q6H PRN    pantoprazole suspension 40 mg Daily    predniSONE tablet 5 mg Daily    sevelamer carbonate pwpk 0.8 g TID WM    silver sulfADIAZINE 1% cream Daily PRN    simethicone chewable tablet 80 mg TID PRN       Objective:     Vital Signs (Most Recent):  Temp: 99.1 °F (37.3 °C) (04/02/22 0800)  Pulse: 104 (04/02/22 1207)  Resp: (!) 37 (04/02/22 1207)  BP: (!) 121/54 (04/02/22 1100)  SpO2: 100 % (04/02/22 1207)  O2 Device (Oxygen Therapy): Trach Collar (04/02/22 0739) Vital Signs (24h Range):  Temp:  [98.3 °F (36.8 °C)-99.7 °F (37.6 °C)] 99.1 °F (37.3 °C)  Pulse:  [] 104  Resp:  [13-48]  37  SpO2:  [30 %-100 %] 100 %  BP: (102-146)/(49-70) 121/54     Weight: 72.7 kg (160 lb 4.4 oz) (04/02/22 0500)  Body mass index is 22.35 kg/m².  Body surface area is 1.91 meters squared.    I/O last 3 completed shifts:  In: 510 [Other:90; NG/GT:420]  Out: -     Physical Exam No edema. No meaningful response during my visit    Significant Labs:sureBMP: No results for input(s): GLU, NA, K, CL, CO2, BUN, CREATININE, CALCIUM, MG in the last 168 hours.  All labs within the past 24 hours have been reviewed.    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] 12/13/2021 Yes    Pressure ulcer of sacral region, unstageable [L89.150]  Yes    Fever [R50.9] 02/26/2022 No    ESRD (end stage renal disease) [N18.6] 02/23/2022 Yes    Open wound of right hand without foreign body [S61.401A]  Unknown    Pressure ulcer of left heel, unstageable [L89.620]  Unknown    Acute blood loss anemia [D62] 12/25/2021 No    Type 2 diabetes mellitus without complication, without long-term current use of insulin [E11.9] 12/25/2021 Yes    Necrotizing fasciitis [M72.6]  Yes    On mechanically assisted ventilation [Z99.11] 12/14/2021 Not Applicable    Hypertension [I10] 09/02/2021 Yes      Problems Resolved During this Admission:    Diagnosis Date Noted Date Resolved POA    Vomiting [R11.10] 02/26/2022 03/02/2022 No    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 I will follow-up with patient. Please contact us if you have any additional questions.    Theo SHAY  MD Saravanan  Nephrology  [facility] [place]

## 2022-04-02 NOTE — PLAN OF CARE
Problem: Adult Inpatient Plan of Care  Goal: Plan of Care Review  Outcome: Ongoing, Progressing  Goal: Patient-Specific Goal (Individualized)  Outcome: Ongoing, Progressing  Goal: Absence of Hospital-Acquired Illness or Injury  Outcome: Ongoing, Progressing  Goal: Optimal Comfort and Wellbeing  Outcome: Ongoing, Progressing     Problem: Communication Impairment (Mechanical Ventilation, Invasive)  Goal: Effective Communication  Outcome: Ongoing, Progressing     Problem: Device-Related Complication Risk (Mechanical Ventilation, Invasive)  Goal: Optimal Device Function  Outcome: Ongoing, Progressing     Problem: Inability to Wean (Mechanical Ventilation, Invasive)  Goal: Mechanical Ventilation Liberation  Outcome: Ongoing, Progressing     Problem: Nutrition Impairment (Mechanical Ventilation, Invasive)  Goal: Optimal Nutrition Delivery  Outcome: Ongoing, Progressing     Problem: Skin and Tissue Injury (Mechanical Ventilation, Invasive)  Goal: Absence of Device-Related Skin and Tissue Injury  Outcome: Ongoing, Progressing     Problem: Ventilator-Induced Lung Injury (Mechanical Ventilation, Invasive)  Goal: Absence of Ventilator-Induced Lung Injury  Outcome: Ongoing, Progressing     Problem: Communication Impairment (Artificial Airway)  Goal: Effective Communication  Outcome: Ongoing, Progressing     Problem: Device-Related Complication Risk (Artificial Airway)  Goal: Optimal Device Function  Outcome: Ongoing, Progressing     Problem: Skin and Tissue Injury (Artificial Airway)  Goal: Absence of Device-Related Skin or Tissue Injury  Outcome: Ongoing, Progressing     Problem: Noninvasive Ventilation Acute  Goal: Effective Unassisted Ventilation and Oxygenation  Outcome: Ongoing, Progressing     Problem: Gas Exchange Impaired  Goal: Optimal Gas Exchange  Outcome: Ongoing, Progressing

## 2022-04-03 PROBLEM — Z99.11 ON MECHANICALLY ASSISTED VENTILATION: Status: RESOLVED | Noted: 2021-01-01 | Resolved: 2022-01-01

## 2022-04-03 NOTE — SUBJECTIVE & OBJECTIVE
Interval History:  remains afebrile. Doing well, tolerating trach collar continuous    Objective:     Vital Signs (Most Recent):  Temp: 98.6 °F (37 °C) (04/03/22 0751)  Pulse: 102 (04/03/22 0800)  Resp: (!) 38 (04/03/22 0800)  BP: 136/61 (04/03/22 0800)  SpO2: 100 % (04/03/22 0800)   Vital Signs (24h Range):  Temp:  [98.4 °F (36.9 °C)-99.5 °F (37.5 °C)] 98.6 °F (37 °C)  Pulse:  [] 102  Resp:  [11-43] 38  SpO2:  [100 %] 100 %  BP: (109-146)/(51-70) 136/61     Weight: 72.8 kg (160 lb 7.9 oz)  Body mass index is 22.38 kg/m².      Intake/Output Summary (Last 24 hours) at 4/3/2022 0909  Last data filed at 4/3/2022 0600  Gross per 24 hour   Intake 1220 ml   Output --   Net 1220 ml       Physical Exam  Vitals reviewed.   Constitutional:       General: He is not in acute distress.     Appearance: He is ill-appearing.      Comments: Chronically ill   HENT:      Head:      Comments: Right temporal deformity     Nose: Nose normal.      Mouth/Throat:      Mouth: Mucous membranes are moist.   Eyes:      Comments: enucleated right eye   Neck:      Comments: Tracheostomy in place  Cardiovascular:      Rate and Rhythm: Normal rate and regular rhythm.      Pulses: Normal pulses.      Heart sounds: Normal heart sounds.   Pulmonary:      Effort: No respiratory distress.      Breath sounds: No stridor. No wheezing, rhonchi or rales.      Comments: Coarse sounds bilaterally  Abdominal:      Palpations: Abdomen is soft.      Tenderness: There is no guarding.      Comments: PEG   Musculoskeletal:         General: No swelling or deformity.      Cervical back: Neck supple.      Right lower leg: No edema.      Left lower leg: No edema.      Comments: Thenar wasting.    Lymphadenopathy:      Cervical: No cervical adenopathy.   Skin:     General: Skin is warm and dry.   Neurological:      Mental Status: He is alert.      Cranial Nerves: No cranial nerve deficit.      Motor: Weakness present.       Vents:  Vent Mode: Standby (04/03/22  0450)  Set Rate: 14 BPM (04/01/22 1948)  Vt Set: 480 mL (04/01/22 1948)  Pressure Support: 12 cmH20 (04/03/22 0033)  PEEP/CPAP: 5 cmH20 (04/03/22 0033)  Oxygen Concentration (%): 35 (04/03/22 0729)  Peak Airway Pressure: 18 cmH2O (04/03/22 0033)  Plateau Pressure: 20 cmH20 (03/07/22 0500)  Total Ve: 8.8 mL (04/03/22 0033)  F/VT Ratio<105 (RSBI): (!) 50.25 (04/03/22 0033)    Lines/Drains/Airways       Central Venous Catheter Line  Duration                  Hemodialysis Catheter 12/30/21 0900 right internal jugular 93 days              Drain  Duration                  Colostomy 12/18/21 1030 Descending/sigmoid  days         Gastrostomy/Enterostomy 03/09/22 1436 Percutaneous endoscopic gastrostomy (PEG) midline feeding 24 days              Airway  Duration                  Surgical Airway 01/04/22 Shiley 89 days                    Significant Labs:    CBC/Anemia Profile:  No results for input(s): WBC, HGB, HCT, PLT, MCV, RDW, IRON, FERRITIN, RETIC, FOLATE, [id number], OCCULTBLOOD in the last 48 hours.     Chemistries:  No results for input(s): NA, K, CL, CO2, BUN, CREATININE, CALCIUM, ALBUMIN, PROT, BILITOT, ALKPHOS, ALT, AST, GLUCOSE, MG, PHOS in the last 48 hours.    None    Significant Imaging:  I have reviewed all pertinent imaging results/findings within the past 24 hours.

## 2022-04-03 NOTE — PROGRESS NOTES
[facility]  Pulmonology  Progress Note    Patient Name: Og Garcia  MRN: [id number]  Admission Date: 12/23/2021  Hospital Length of Stay: 101 days  Code Status: Prior  Attending Provider: Cecil Abernathy DO  Primary Care Provider: Hector Arndt DNP, FNP-C   Principal Problem: Denice gangrene    Subjective:     Interval History:  remains afebrile. Doing well, tolerating trach collar continuous    Objective:     Vital Signs (Most Recent):  Temp: 98.6 °F (37 °C) (04/03/22 0751)  Pulse: 102 (04/03/22 0800)  Resp: (!) 38 (04/03/22 0800)  BP: 136/61 (04/03/22 0800)  SpO2: 100 % (04/03/22 0800)   Vital Signs (24h Range):  Temp:  [98.4 °F (36.9 °C)-99.5 °F (37.5 °C)] 98.6 °F (37 °C)  Pulse:  [] 102  Resp:  [11-43] 38  SpO2:  [100 %] 100 %  BP: (109-146)/(51-70) 136/61     Weight: 72.8 kg (160 lb 7.9 oz)  Body mass index is 22.38 kg/m².      Intake/Output Summary (Last 24 hours) at 4/3/2022 0909  Last data filed at 4/3/2022 0600  Gross per 24 hour   Intake 1220 ml   Output --   Net 1220 ml       Physical Exam  Vitals reviewed.   Constitutional:       General: He is not in acute distress.     Appearance: He is ill-appearing.      Comments: Chronically ill   HENT:      Head:      Comments: Right temporal deformity     Nose: Nose normal.      Mouth/Throat:      Mouth: Mucous membranes are moist.   Eyes:      Comments: enucleated right eye   Neck:      Comments: Tracheostomy in place  Cardiovascular:      Rate and Rhythm: Normal rate and regular rhythm.      Pulses: Normal pulses.      Heart sounds: Normal heart sounds.   Pulmonary:      Effort: No respiratory distress.      Breath sounds: No stridor. No wheezing, rhonchi or rales.      Comments: Coarse sounds bilaterally  Abdominal:      Palpations: Abdomen is soft.      Tenderness: There is no guarding.      Comments: PEG   Musculoskeletal:         General: No swelling or deformity.      Cervical back: Neck supple.      Right lower  leg: No edema.      Left lower leg: No edema.      Comments: Thenar wasting.    Lymphadenopathy:      Cervical: No cervical adenopathy.   Skin:     General: Skin is warm and dry.   Neurological:      Mental Status: He is alert.      Cranial Nerves: No cranial nerve deficit.      Motor: Weakness present.       Vents:  Vent Mode: Standby (04/03/22 0450)  Set Rate: 14 BPM (04/01/22 1948)  Vt Set: 480 mL (04/01/22 1948)  Pressure Support: 12 cmH20 (04/03/22 0033)  PEEP/CPAP: 5 cmH20 (04/03/22 0033)  Oxygen Concentration (%): 35 (04/03/22 0729)  Peak Airway Pressure: 18 cmH2O (04/03/22 0033)  Plateau Pressure: 20 cmH20 (03/07/22 0500)  Total Ve: 8.8 mL (04/03/22 0033)  F/VT Ratio<105 (RSBI): (!) 50.25 (04/03/22 0033)    Lines/Drains/Airways       Central Venous Catheter Line  Duration                  Hemodialysis Catheter 12/30/21 0900 right internal jugular 93 days              Drain  Duration                  Colostomy 12/18/21 1030 Descending/sigmoid  days         Gastrostomy/Enterostomy 03/09/22 1436 Percutaneous endoscopic gastrostomy (PEG) midline feeding 24 days              Airway  Duration                  Surgical Airway 01/04/22 Shiley 89 days                    Significant Labs:    CBC/Anemia Profile:  No results for input(s): WBC, HGB, HCT, PLT, MCV, RDW, IRON, FERRITIN, RETIC, FOLATE, [id number], OCCULTBLOOD in the last 48 hours.     Chemistries:  No results for input(s): NA, K, CL, CO2, BUN, CREATININE, CALCIUM, ALBUMIN, PROT, BILITOT, ALKPHOS, ALT, AST, GLUCOSE, MG, PHOS in the last 48 hours.    None    Significant Imaging:  I have reviewed all pertinent imaging results/findings within the past 24 hours.    Assessment/Plan:     * Denice gangrene  - s/p multiple debridements  - wound vac in place  - ID consulted for antibiotic management: He was treated with rocephin, daptomycin, clindamycin. 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 Cazares  - 2/21 back to OR for skin grafting  - 3/9 had peg tube placed       Fever  2/28 - resp culture with pseudomonas and klebsiella  treat with zosyn for 7 days   Blood culture from 2/25 also growing Pseudomonas. Sensitive to zosyn. Will need to continue zosyn for at least 10 days total  - low grade fever since 3/28, CXR- unchanged and repeat CBC- WBC 20.57  - remove malpositioned LUE PICC. Fevers have resolved since 4/1  - blood cultures- NGTD  - does have areas of skin breakdown and some drainage from left heel wound  - if fever again, will add empiric abx for wound infections  - afebrile over past 24 hours    ESRD (end stage renal disease)  Started on HD 12/30   Continue with HD per nephrology      Type 2 diabetes mellitus without complication, without long-term current use of insulin  - accuchecks look ok  - Continue with current care      Hypertension  Blood pressure is ok  Continue with current care      On mechanically assisted ventilation-resolved as of 4/3/2022  - 12/14 intubated, tracheostomy 1/4  - on continuous trach collar                 Raul Hall MD  Pulmonology  [facility]

## 2022-04-03 NOTE — NURSING
2020 Received pt lying in bed resting. No noted distress. No voiced complaints or distress.  Skin warm to touch. Respiration even and unlabored. #8 trach midline and intact. Pt on CPAP 12/5/35% and tolerating at this time.  Peg tube intact and secure with Nepro 1.8 @ 60 ml/hr.lung sounds coarse. Bowel sounds present. Dressing to bilateral feet and sacral intact and secure. Right thigh and abdomen open to air. SCD hose on and in use to bilateral lower extremities.  Safety measures ongoing.     0030 Lying in bed resting.  No noted distress. Safety measures ongoing.    0330 Lying in bed resting.  No noted distress or acute changes. Bath given.  Safety measures ongoing.    0625 Lying in bed resting. No noted distress or acute changes. All lines intact and secure. Safety measures ongoing

## 2022-04-03 NOTE — RESPIRATORY THERAPY
Patient was on trach collar from 5am til 3pm. Patient RR went up and needed to rest. Spoke to MD about possible trach replacement due to leaks on vent and balloon not staying inflated. Patient was put back on cpap.

## 2022-04-03 NOTE — ASSESSMENT & PLAN NOTE
2/28 - resp culture with pseudomonas and klebsiella  treat with zosyn for 7 days   Blood culture from 2/25 also growing Pseudomonas. Sensitive to zosyn. Will need to continue zosyn for at least 10 days total  - low grade fever since 3/28, CXR- unchanged and repeat CBC- WBC 20.57  - remove malpositioned LUE PICC. Fevers have resolved since 4/1  - blood cultures- NGTD  - does have areas of skin breakdown and some drainage from left heel wound  - if fever again, will add empiric abx for wound infections  - afebrile over past 24 hours

## 2022-04-03 NOTE — PROGRESS NOTES
[facility] [place]  Nephrology  Progress Note    Patient Name: Og Garcia  MRN: [id number]  Admission Date: 12/23/2021  Hospital Length of Stay: 101 days  Attending Provider: Cecil Abernathy DO   Primary Care Physician: Hector Arndt, JULISSA, FNP-C  Principal Problem:Denice gangrene    Consults  Subjective:     Interval History: F/U ESRD    Review of patient's allergies indicates:  No Known Allergies  Current Facility-Administered Medications   Medication Frequency    0.9%  NaCl infusion PRN    acetaminophen tablet 500 mg Q6H PRN    acetic acid 0.25 % irrigation PRN    albuterol nebulizer solution 2.5 mg Q4H PRN    albuterol-ipratropium 2.5 mg-0.5 mg/3 mL nebulizer solution 3 mL Q6H    bisacodyL EC tablet 10 mg Daily PRN    calcium gluconate 1 g in dextrose 5 % in water (D5W) 5 % 100 mL IVPB (MB+) Once    dextrose 50% injection 12.5 g PRN    diltiaZEM tablet 90 mg Q6H    glucagon (human recombinant) injection 1 mg PRN    guaiFENesin 100 mg/5 ml syrup 200 mg Q6H    heparin (porcine) injection 4,000 Units PRN    heparin (porcine) injection 5,000 Units Q8H    hydrALAZINE tablet 25 mg Q8H    insulin aspart U-100 injection 1-10 Units Q6H    insulin detemir U-100 injection 25 Units QHS    ondansetron injection 8 mg Q6H PRN    pantoprazole suspension 40 mg Daily    predniSONE tablet 5 mg Daily    sevelamer carbonate pwpk 0.8 g TID WM    silver sulfADIAZINE 1% cream Daily PRN    simethicone chewable tablet 80 mg TID PRN       Objective:     Vital Signs (Most Recent):  Temp: 98.6 °F (37 °C) (04/03/22 0751)  Pulse: 107 (04/03/22 1133)  Resp: (!) 38 (04/03/22 1133)  BP: 137/61 (04/03/22 1000)  SpO2: 100 % (04/03/22 1133)  O2 Device (Oxygen Therapy): Trach Collar (04/03/22 1133) Vital Signs (24h Range):  Temp:  [98.6 °F (37 °C)-99.5 °F (37.5 °C)] 98.6 °F (37 °C)  Pulse:  [] 107  Resp:  [11-40] 38  SpO2:  [100 %] 100 %  BP: (109-146)/(51-70) 137/61     Weight: 72.8 kg (160 lb  7.9 oz) (04/03/22 0420)  Body mass index is 22.38 kg/m².  Body surface area is 1.91 meters squared.    I/O last 3 completed shifts:  In: 1440 [Other:100; NG/GT:1340]  Out: -     Physical Exam In no distress. Doesn't interact . Chest clear    Significant Labs:sureBMP: No results for input(s): GLU, NA, K, CL, CO2, BUN, CREATININE, CALCIUM, MG in the last 168 hours.  All labs within the past 24 hours have been reviewed.    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] 12/13/2021 Yes    Pressure ulcer of sacral region, unstageable [L89.150]  Yes    Fever [R50.9] 02/26/2022 No    ESRD (end stage renal disease) [N18.6] 02/23/2022 Yes    Open wound of right hand without foreign body [S61.401A]  Unknown    Pressure ulcer of left heel, unstageable [L89.620]  Unknown    Acute blood loss anemia [D62] 12/25/2021 No    Type 2 diabetes mellitus without complication, without long-term current use of insulin [E11.9] 12/25/2021 Yes    Necrotizing fasciitis [M72.6]  Yes    Hypertension [I10] 09/02/2021 Yes      Problems Resolved During this Admission:    Diagnosis Date Noted Date Resolved POA    Vomiting [R11.10] 02/26/2022 03/02/2022 No    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 I will follow-up with patient. Please contact us if you have any additional questions.    Theo Coe MD  Nephrology  [facility]

## 2022-04-04 NOTE — NURSING
Patients respiratory rate is elevated to 40s. Patient is on assist control. Patient is breathing mostly from abdomen. -115. Blood pressure stable. Oral care, repositioning, tracheal suctioning performed.     08:15 Dr. Abernathy at bedside. PSV trial attempted Respiratory Rate increased patient switched back to A/C immediately. Dr Evon Abernathy stated letting patient rest on A/C today.

## 2022-04-04 NOTE — PLAN OF CARE
Problem: Fluid and Electrolyte Imbalance (Acute Kidney Injury/Impairment)  Goal: Fluid and Electrolyte Balance  Outcome: Ongoing, Progressing     Problem: Oral Intake Inadequate (Acute Kidney Injury/Impairment)  Goal: Optimal Nutrition Intake  Outcome: Ongoing, Progressing     Problem: Renal Function Impairment (Acute Kidney Injury/Impairment)  Goal: Effective Renal Function  Outcome: Ongoing, Progressing     Problem: Infection  Goal: Absence of Infection Signs and Symptoms  Outcome: Ongoing, Progressing     Problem: Communication Impairment (Mechanical Ventilation, Invasive)  Goal: Effective Communication  Outcome: Ongoing, Progressing     Problem: Device-Related Complication Risk (Mechanical Ventilation, Invasive)  Goal: Optimal Device Function  Outcome: Ongoing, Progressing     Problem: Inability to Wean (Mechanical Ventilation, Invasive)  Goal: Mechanical Ventilation Liberation  Outcome: Ongoing, Progressing

## 2022-04-04 NOTE — NURSING
2020 Received pt lying in bed resting.  No noted distress.  Skin warm to touch.  Respiration and even and unlabored. #8LPC trach midline and intact. Pt on CPAP 12/5/35%; pt tolerating well.  Lung sounds coarse. Peg tube intact and secure with Nepro @ 60 ml/hr with no complications. Colostomy to Left quadrants of abdomen intact and secure.SCD hose on and in use to bilateral lower extremities. Dressing to sacral and bilateral feet intact and secure. Abdomen and right thigh open to air.  Safety measures ongoing.     2030 Lying in bed resting. Received bath and sacral dressing change.  Safety measures ongoing.    0030 Lying in bed resting.  No noted distres or acute changes. Safety measures ongoing.    0330 Lying in bed resting.  No noted distress or acute changes.  Safety measures ongoing.    0615 Pt starts to breathe fast ;increase respiratory rate but no dropping  of oxygen. Pt was on CPAP. Suction pt and gave extra oxygen. Call respiratory for more suggestion. Place back on AC settings and increase FIO2. Will continue to monitor.     0630 Pt breathing better and appears to have calm down.  Respiratory rate decreasing. Will continue to monitor and  Report to oncoming shift nurse.

## 2022-04-04 NOTE — SUBJECTIVE & OBJECTIVE
Interval History: The patient had some issues with increased RR and heart rate this am and was placed back on the ventilator. He is afebrile this am. Oxygenating adequately.    Objective:     Vital Signs (Most Recent):  Temp: 98.2 °F (36.8 °C) (04/04/22 1200)  Pulse: 108 (04/04/22 1200)  Resp: (!) 38 (04/04/22 1200)  BP: 133/68 (04/04/22 1200)  SpO2: 100 % (04/04/22 1200)   Vital Signs (24h Range):  Temp:  [97.2 °F (36.2 °C)-100.1 °F (37.8 °C)] 98.2 °F (36.8 °C)  Pulse:  [] 108  Resp:  [11-46] 38  SpO2:  [100 %] 100 %  BP: (111-154)/(55-77) 133/68     Weight: 74.2 kg (163 lb 9.3 oz)  Body mass index is 22.81 kg/m².      Intake/Output Summary (Last 24 hours) at 4/4/2022 1213  Last data filed at 4/4/2022 1200  Gross per 24 hour   Intake 1380 ml   Output 200 ml   Net 1180 ml       Physical Exam  Vitals reviewed.   Constitutional:       General: He is not in acute distress.     Appearance: He is ill-appearing.      Comments: Chronically ill appearing   HENT:      Right Ear: External ear normal.      Left Ear: External ear normal.      Mouth/Throat:      Mouth: Mucous membranes are moist.   Eyes:      Pupils: Pupils are equal, round, and reactive to light.   Cardiovascular:      Rate and Rhythm: Regular rhythm. Tachycardia present.   Pulmonary:      Breath sounds: Normal breath sounds. No wheezing, rhonchi or rales.      Comments: Increased respiratory rate  Abdominal:      Palpations: Abdomen is soft.   Musculoskeletal:         General: Normal range of motion.      Cervical back: Normal range of motion and neck supple.   Skin:     General: Skin is warm and dry.      Capillary Refill: Capillary refill takes less than 2 seconds.   Neurological:      Mental Status: He is alert. Mental status is at baseline.   Psychiatric:         Mood and Affect: Mood normal.       Vents:  Vent Mode: A/C (04/04/22 0801)  Set Rate: 14 BPM (04/04/22 0801)  Vt Set: 480 mL (04/04/22 0801)  Pressure Support: 12 cmH20 (04/04/22  0340)  PEEP/CPAP: 5 cmH20 (04/04/22 0801)  Oxygen Concentration (%): 35 (04/04/22 0809)  Peak Airway Pressure: 35 cmH2O (04/04/22 0801)  Plateau Pressure: 20 cmH20 (03/07/22 0500)  Total Ve: 14.8 mL (04/04/22 0801)  F/VT Ratio<105 (RSBI): (!) 85.71 (04/04/22 0801)    Lines/Drains/Airways       Central Venous Catheter Line  Duration                  Hemodialysis Catheter 12/30/21 0900 right internal jugular 95 days              Drain  Duration                  Colostomy 12/18/21 1030 Descending/sigmoid  days         Gastrostomy/Enterostomy 03/09/22 1436 Percutaneous endoscopic gastrostomy (PEG) midline feeding 25 days              Airway  Duration                  Surgical Airway 01/04/22 Shiley 90 days                    Significant Labs:    CBC/Anemia Profile:  Recent Labs   Lab 04/04/22  0223   WBC 19.40*   HGB 6.4*   HCT 20.1*      MCV 92.6   RDW 14.6*        Chemistries:  Recent Labs   Lab 04/04/22  0931   *   K 4.1   CL 96*   CO2 25   BUN 97*   CREATININE 2.59*   CALCIUM 8.4*       All pertinent labs within the past 24 hours have been reviewed.    Significant Imaging:  I have reviewed all pertinent imaging results/findings within the past 24 hours.

## 2022-04-04 NOTE — PLAN OF CARE
Problem: Communication Impairment (Mechanical Ventilation, Invasive)  Goal: Effective Communication  Outcome: Ongoing, Progressing     Problem: Device-Related Complication Risk (Mechanical Ventilation, Invasive)  Goal: Optimal Device Function  Outcome: Ongoing, Progressing     Problem: Inability to Wean (Mechanical Ventilation, Invasive)  Goal: Mechanical Ventilation Liberation  Outcome: Ongoing, Progressing     Problem: Skin and Tissue Injury (Mechanical Ventilation, Invasive)  Goal: Absence of Device-Related Skin and Tissue Injury  Outcome: Ongoing, Progressing     Problem: Ventilator-Induced Lung Injury (Mechanical Ventilation, Invasive)  Goal: Absence of Ventilator-Induced Lung Injury  Outcome: Ongoing, Progressing     Problem: Communication Impairment (Artificial Airway)  Goal: Effective Communication  Outcome: Ongoing, Progressing     Problem: Device-Related Complication Risk (Artificial Airway)  Goal: Optimal Device Function  Outcome: Ongoing, Progressing     Problem: Skin and Tissue Injury (Artificial Airway)  Goal: Absence of Device-Related Skin or Tissue Injury  Outcome: Ongoing, Progressing     Problem: Gas Exchange Impaired  Goal: Optimal Gas Exchange  Outcome: Ongoing, Progressing

## 2022-04-04 NOTE — PROGRESS NOTES
[facility]  Pulmonology  Progress Note    Patient Name: Og Garcia  MRN: [id number]  Admission Date: 12/23/2021  Hospital Length of Stay: 102 days  Code Status: Prior  Attending Provider: Cecil Abernathy DO  Primary Care Provider: Hector Arndt DNP, FNP-C   Principal Problem: Denice gangrene    Subjective:     Interval History: The patient had some issues with increased RR and heart rate this am and was placed back on the ventilator. He is afebrile this am. Oxygenating adequately.    Objective:     Vital Signs (Most Recent):  Temp: 98.2 °F (36.8 °C) (04/04/22 1200)  Pulse: 108 (04/04/22 1200)  Resp: (!) 38 (04/04/22 1200)  BP: 133/68 (04/04/22 1200)  SpO2: 100 % (04/04/22 1200)   Vital Signs (24h Range):  Temp:  [97.2 °F (36.2 °C)-100.1 °F (37.8 °C)] 98.2 °F (36.8 °C)  Pulse:  [] 108  Resp:  [11-46] 38  SpO2:  [100 %] 100 %  BP: (111-154)/(55-77) 133/68     Weight: 74.2 kg (163 lb 9.3 oz)  Body mass index is 22.81 kg/m².      Intake/Output Summary (Last 24 hours) at 4/4/2022 1213  Last data filed at 4/4/2022 1200  Gross per 24 hour   Intake 1380 ml   Output 200 ml   Net 1180 ml       Physical Exam  Vitals reviewed.   Constitutional:       General: He is not in acute distress.     Appearance: He is ill-appearing.      Comments: Chronically ill appearing   HENT:      Right Ear: External ear normal.      Left Ear: External ear normal.      Mouth/Throat:      Mouth: Mucous membranes are moist.   Eyes:      Pupils: Pupils are equal, round, and reactive to light.   Cardiovascular:      Rate and Rhythm: Regular rhythm. Tachycardia present.   Pulmonary:      Breath sounds: Normal breath sounds. No wheezing, rhonchi or rales.      Comments: Increased respiratory rate  Abdominal:      Palpations: Abdomen is soft.   Musculoskeletal:         General: Normal range of motion.      Cervical back: Normal range of motion and neck supple.   Skin:     General: Skin is warm and dry.       Capillary Refill: Capillary refill takes less than 2 seconds.   Neurological:      Mental Status: He is alert. Mental status is at baseline.   Psychiatric:         Mood and Affect: Mood normal.       Vents:  Vent Mode: A/C (04/04/22 0801)  Set Rate: 14 BPM (04/04/22 0801)  Vt Set: 480 mL (04/04/22 0801)  Pressure Support: 12 cmH20 (04/04/22 0340)  PEEP/CPAP: 5 cmH20 (04/04/22 0801)  Oxygen Concentration (%): 35 (04/04/22 0809)  Peak Airway Pressure: 35 cmH2O (04/04/22 0801)  Plateau Pressure: 20 cmH20 (03/07/22 0500)  Total Ve: 14.8 mL (04/04/22 0801)  F/VT Ratio<105 (RSBI): (!) 85.71 (04/04/22 0801)    Lines/Drains/Airways       Central Venous Catheter Line  Duration                  Hemodialysis Catheter 12/30/21 0900 right internal jugular 95 days              Drain  Duration                  Colostomy 12/18/21 1030 Descending/sigmoid  days         Gastrostomy/Enterostomy 03/09/22 1436 Percutaneous endoscopic gastrostomy (PEG) midline feeding 25 days              Airway  Duration                  Surgical Airway 01/04/22 Shiley 90 days                    Significant Labs:    CBC/Anemia Profile:  Recent Labs   Lab 04/04/22  0223   WBC 19.40*   HGB 6.4*   HCT 20.1*      MCV 92.6   RDW 14.6*        Chemistries:  Recent Labs   Lab 04/04/22  0931   *   K 4.1   CL 96*   CO2 25   BUN 97*   CREATININE 2.59*   CALCIUM 8.4*       All pertinent labs within the past 24 hours have been reviewed.    Significant Imaging:  I have reviewed all pertinent imaging results/findings within the past 24 hours.    Assessment/Plan:     * Denice gangrene  - s/p multiple debridements  - wound vac in place  - ID consulted for antibiotic management: He was treated with rocephin, daptomycin, clindamycin. 12/21- change clindamycin to ampicillin and treat for another week  - remains on ampicillin, this was changed to merrem 1/9  - pathology with fungal species consistent with mucormycosis initiated on amphotericin  -   patient went to OR on 1/10 and 1/12 for debridement  - 2/9- antibiotics and amphotericin completed  - 2/17 back to OR with Dr. Cazares  - 2/21 back to OR for skin grafting  - 3/9 had peg tube placed       Fever  2/28 - resp culture with pseudomonas and klebsiella  treat with zosyn for 7 days   Blood culture from 2/25 also growing Pseudomonas. Sensitive to zosyn. Will need to continue zosyn for at least 10 days total  - low grade fever since 3/28, CXR- unchanged and repeat CBC- WBC 20.57  - remove malpositioned LUE PICC. Fevers have resolved since 4/1  - blood cultures- NGTD  - does have areas of skin breakdown and some drainage from left heel wound  - if fever again, will add empiric abx for wound infections  - afebrile over past 24 hours    ESRD (end stage renal disease)  Started on HD 12/30   Continue with HD per nephrology      Type 2 diabetes mellitus without complication, without long-term current use of insulin  - accuchecks look ok  - Continue with current care      Acute blood loss anemia  No active bleeding  Hgb is down to 6.4 today  We are going to transfuse 1 unit today 4/4    Hypertension  Blood pressure is ok  Continue with current care        Respiratory distress- had to be placed back on the ventilator this am. I am going to check chest xray this am. Will also add something for pain.           Cecil Abernathy, DO  Pulmonology  [facility]

## 2022-04-04 NOTE — PROGRESS NOTES
Placed patient back on the vent via ACV with previous settings for rest.  Patient started experiencing some respiratory distress on CPAP after his bath.  Patient's respiratory rate increased to the lower 40's and stayed there.  Patient's respirations came down to the mid's 20 after being placed on ACV and he seems to be breathing better.

## 2022-04-05 NOTE — NURSING
Patient resting in bed. No acute distress noted. Right midline/right 22g int intact without signs of infection. Patient remains on the vent on assist control no signs of resp distress noted, llq colostomy intact, no leakage noted. All dressing intact.safety measures intact, report given to oncoming nurse.

## 2022-04-05 NOTE — NURSING
Received pt in bed no acute distress noted. Currently on the vent, AC: 14,480,5,35%, sats 100%. Nepro at 60ml/hr infusing without difficulty. #8lpc trach intact, bulb inflated. Colostomy intact, with mushy brown stool, stoma pink. Peg tube intact, no signs of drainage noted. Dressing intact to austyn feet and sacral small amt of drainage noted to sacral dressing, foul sent noted. Wb/scd hose intact, pt on LIDA with tap for turnings. Pillow noted under feet. Strength very weak and ridged, bed low, cb near, will continue to monitor.

## 2022-04-05 NOTE — ASSESSMENT & PLAN NOTE
- s/p multiple debridements  - wound vac in place  - ID consulted for antibiotic management: He was treated with rocephin, daptomycin, clindamycin. 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 Cazares  - 2/21 back to OR for skin grafting  - 3/9 had peg tube placed   Currently stable with no acute issues

## 2022-04-05 NOTE — PLAN OF CARE
Problem: Adult Inpatient Plan of Care  Goal: Plan of Care Review  Outcome: Ongoing, Not Progressing  Goal: Patient-Specific Goal (Individualized)  Outcome: Ongoing, Not Progressing  Goal: Absence of Hospital-Acquired Illness or Injury  Outcome: Ongoing, Not Progressing  Goal: Optimal Comfort and Wellbeing  Outcome: Ongoing, Not Progressing  Goal: Readiness for Transition of Care  Outcome: Ongoing, Not Progressing     Problem: Adjustment to Illness (Sepsis/Septic Shock)  Goal: Optimal Coping  Outcome: Ongoing, Not Progressing     Problem: Bleeding (Sepsis/Septic Shock)  Goal: Absence of Bleeding  Outcome: Ongoing, Not Progressing     Problem: Glycemic Control Impaired (Sepsis/Septic Shock)  Goal: Blood Glucose Level Within Desired Range  Outcome: Ongoing, Not Progressing     Problem: Infection Progression (Sepsis/Septic Shock)  Goal: Absence of Infection Signs and Symptoms  Outcome: Ongoing, Not Progressing     Problem: Nutrition Impaired (Sepsis/Septic Shock)  Goal: Optimal Nutrition Intake  Outcome: Ongoing, Not Progressing     Problem: Fluid and Electrolyte Imbalance (Acute Kidney Injury/Impairment)  Goal: Fluid and Electrolyte Balance  Outcome: Ongoing, Not Progressing     Problem: Oral Intake Inadequate (Acute Kidney Injury/Impairment)  Goal: Optimal Nutrition Intake  Outcome: Ongoing, Not Progressing     Problem: Renal Function Impairment (Acute Kidney Injury/Impairment)  Goal: Effective Renal Function  Outcome: Ongoing, Not Progressing     Problem: Infection  Goal: Absence of Infection Signs and Symptoms  Outcome: Ongoing, Not Progressing     Problem: Fall Injury Risk  Goal: Absence of Fall and Fall-Related Injury  Outcome: Ongoing, Not Progressing     Problem: Communication Impairment (Mechanical Ventilation, Invasive)  Goal: Effective Communication  Outcome: Ongoing, Not Progressing     Problem: Device-Related Complication Risk (Mechanical Ventilation, Invasive)  Goal: Optimal Device Function  Outcome:  Ongoing, Not Progressing     Problem: Inability to Wean (Mechanical Ventilation, Invasive)  Goal: Mechanical Ventilation Liberation  Outcome: Ongoing, Not Progressing     Problem: Nutrition Impairment (Mechanical Ventilation, Invasive)  Goal: Optimal Nutrition Delivery  Outcome: Ongoing, Not Progressing     Problem: Skin and Tissue Injury (Mechanical Ventilation, Invasive)  Goal: Absence of Device-Related Skin and Tissue Injury  Outcome: Ongoing, Not Progressing     Problem: Ventilator-Induced Lung Injury (Mechanical Ventilation, Invasive)  Goal: Absence of Ventilator-Induced Lung Injury  Outcome: Ongoing, Not Progressing     Problem: Communication Impairment (Artificial Airway)  Goal: Effective Communication  Outcome: Ongoing, Not Progressing     Problem: Device-Related Complication Risk (Artificial Airway)  Goal: Optimal Device Function  Outcome: Ongoing, Not Progressing     Problem: Skin and Tissue Injury (Artificial Airway)  Goal: Absence of Device-Related Skin or Tissue Injury  Outcome: Ongoing, Not Progressing     Problem: Noninvasive Ventilation Acute  Goal: Effective Unassisted Ventilation and Oxygenation  Outcome: Ongoing, Not Progressing     Problem: Skin Injury Risk Increased  Goal: Skin Health and Integrity  Outcome: Ongoing, Not Progressing     Problem: Device-Related Complication Risk (Hemodialysis)  Goal: Safe, Effective Therapy Delivery  Outcome: Ongoing, Not Progressing     Problem: Hemodynamic Instability (Hemodialysis)  Goal: Effective Tissue Perfusion  Outcome: Ongoing, Not Progressing     Problem: Infection (Hemodialysis)  Goal: Absence of Infection Signs and Symptoms  Outcome: Ongoing, Not Progressing     Problem: Diabetes Comorbidity  Goal: Blood Glucose Level Within Targeted Range  Outcome: Ongoing, Not Progressing     Problem: Impaired Wound Healing  Goal: Optimal Wound Healing  Outcome: Ongoing, Not Progressing     Problem: Gas Exchange Impaired  Goal: Optimal Gas Exchange  Outcome:  Ongoing, Not Progressing    Patient status unchanged will continue to monitor

## 2022-04-05 NOTE — PLAN OF CARE
Problem: Gas Exchange Impaired  Goal: Optimal Gas Exchange  Outcome: Ongoing, Progressing     Problem: Gas Exchange Impaired  Goal: Optimal Gas Exchange  Outcome: Ongoing, Progressing     Problem: Impaired Wound Healing  Goal: Optimal Wound Healing  Outcome: Ongoing, Not Progressing

## 2022-04-05 NOTE — SUBJECTIVE & OBJECTIVE
Interval History: The patient is resting.  He is on trach collar.  Continuing with dialysis.    Review of patient's allergies indicates:  No Known Allergies  Current Facility-Administered Medications   Medication Frequency    0.9%  NaCl infusion (for blood administration) Q24H PRN    0.9%  NaCl infusion PRN    acetaminophen tablet 500 mg Q6H PRN    acetic acid 0.25 % irrigation PRN    albuterol nebulizer solution 2.5 mg Q4H PRN    albuterol-ipratropium 2.5 mg-0.5 mg/3 mL nebulizer solution 3 mL Q6H    bisacodyL EC tablet 10 mg Daily PRN    calcium gluconate 1 g in dextrose 5 % in water (D5W) 5 % 100 mL IVPB (MB+) Once    dextrose 50% injection 12.5 g PRN    diltiaZEM tablet 90 mg Q6H    glucagon (human recombinant) injection 1 mg PRN    guaiFENesin 100 mg/5 ml syrup 200 mg Q6H    heparin (porcine) injection 4,000 Units PRN    heparin (porcine) injection 5,000 Units Q8H    hydrALAZINE tablet 25 mg Q8H    insulin aspart U-100 injection 1-10 Units Q6H    insulin detemir U-100 injection 25 Units QHS    morphine injection 4 mg Q4H PRN    ondansetron injection 8 mg Q6H PRN    pantoprazole suspension 40 mg Daily    predniSONE tablet 5 mg Daily    sevelamer carbonate pwpk 0.8 g TID WM    silver sulfADIAZINE 1% cream Daily PRN    simethicone chewable tablet 80 mg TID PRN       Objective:     Vital Signs (Most Recent):  Temp: (P) 100 °F (37.8 °C) (04/04/22 1900)  Pulse: 101 (04/04/22 1900)  Resp: (!) 26 (04/04/22 1900)  BP: 111/60 (04/04/22 1900)  SpO2: 100 % (04/04/22 1900)  O2 Device (Oxygen Therapy): ventilator (04/04/22 1710)   Vital Signs (24h Range):  Temp:  [98.2 °F (36.8 °C)-100 °F (37.8 °C)] (P) 100 °F (37.8 °C)  Pulse:  [] 101  Resp:  [11-44] 26  SpO2:  [94 %-100 %] 100 %  BP: (111-156)/(57-85) 111/60     Weight: 74.2 kg (163 lb 9.3 oz) (04/04/22 0500)  Body mass index is 22.81 kg/m².  Body surface area is 1.93 meters squared.    I/O last 3 completed shifts:  In: 2990 [P.O.:120; Blood:360; Other:380;  NG/GT:2130]  Out: 3280 [Other:2880; Stool:400]    Physical Exam  Vitals reviewed.   HENT:      Head: Normocephalic.   Cardiovascular:      Rate and Rhythm: Regular rhythm.   Pulmonary:      Effort: Pulmonary effort is normal.   Abdominal:      General: Abdomen is flat.       Significant Labs:  BMP:   Recent Labs   Lab 04/04/22  0931   GLU 95   *   K 4.1   CL 96*   CO2 25   BUN 97*   CREATININE 2.59*   CALCIUM 8.4*     CBC:   Recent Labs   Lab 04/04/22  0223 04/04/22  1703   WBC 19.40*  --    RBC 2.17*  --    HGB 6.4* 7.8*   HCT 20.1* 24.0*     --    MCV 92.6  --    MCH 29.5  --    MCHC 31.8*  --         Significant Imaging:  Labs: Reviewed

## 2022-04-05 NOTE — SUBJECTIVE & OBJECTIVE
Interval History: The patient is resting.  No acute changes.  He tolerated dialysis on yesterday.    Review of patient's allergies indicates:  No Known Allergies  Current Facility-Administered Medications   Medication Frequency    0.9%  NaCl infusion (for blood administration) Q24H PRN    0.9%  NaCl infusion PRN    acetaminophen tablet 500 mg Q6H PRN    acetic acid 0.25 % irrigation PRN    albuterol nebulizer solution 2.5 mg Q4H PRN    albuterol-ipratropium 2.5 mg-0.5 mg/3 mL nebulizer solution 3 mL Q6H    bisacodyL EC tablet 10 mg Daily PRN    calcium gluconate 1 g in dextrose 5 % in water (D5W) 5 % 100 mL IVPB (MB+) Once    dextrose 50% injection 12.5 g PRN    diltiaZEM tablet 90 mg Q6H    glucagon (human recombinant) injection 1 mg PRN    guaiFENesin 100 mg/5 ml syrup 200 mg Q6H    heparin (porcine) injection 4,000 Units PRN    heparin (porcine) injection 5,000 Units Q8H    hydrALAZINE tablet 25 mg Q8H    insulin aspart U-100 injection 1-10 Units Q6H    insulin detemir U-100 injection 25 Units QHS    morphine injection 4 mg Q3H PRN    ondansetron injection 8 mg Q6H PRN    pantoprazole suspension 40 mg Daily    predniSONE tablet 5 mg Daily    sevelamer carbonate pwpk 0.8 g TID WM    silver sulfADIAZINE 1% cream Daily PRN    simethicone chewable tablet 80 mg TID PRN       Objective:     Vital Signs (Most Recent):  Temp: 100.1 °F (37.8 °C) (04/05/22 1153)  Pulse: 95 (04/05/22 1345)  Resp: (!) 26 (04/05/22 1423)  BP: (!) 114/55 (04/05/22 1345)  SpO2: 100 % (04/05/22 1345)  O2 Device (Oxygen Therapy): ventilator (04/05/22 0732)   Vital Signs (24h Range):  Temp:  [98.5 °F (36.9 °C)-101.9 °F (38.8 °C)] 100.1 °F (37.8 °C)  Pulse:  [] 95  Resp:  [22-44] 26  SpO2:  [96 %-100 %] 100 %  BP: ()/(49-95) 114/55     Weight: 72.3 kg (159 lb 6.3 oz) (04/05/22 0237)  Body mass index is 22.23 kg/m².  Body surface area is 1.9 meters squared.    I/O last 3 completed shifts:  In: 3480 [P.O.:120; Blood:360; Other:380;  NG/GT:2620]  Out: 3280 [Other:2880; Stool:400]    Physical Exam  Vitals reviewed.   HENT:      Head: Atraumatic.      Mouth/Throat:      Mouth: Mucous membranes are moist.   Cardiovascular:      Rate and Rhythm: Regular rhythm.   Pulmonary:      Comments: The patient is on the ventilator.  Abdominal:      General: Abdomen is flat.      Palpations: Abdomen is soft.       Significant Labs:  BMP:   Recent Labs   Lab 04/04/22  0931   GLU 95   *   K 4.1   CL 96*   CO2 25   BUN 97*   CREATININE 2.59*   CALCIUM 8.4*     CBC:   Recent Labs   Lab 04/04/22  0223 04/04/22  1703   WBC 19.40*  --    RBC 2.17*  --    HGB 6.4* 7.8*   HCT 20.1* 24.0*     --    MCV 92.6  --    MCH 29.5  --    MCHC 31.8*  --         Significant Imaging:  Labs: Reviewed

## 2022-04-05 NOTE — PROGRESS NOTES
[facility]  Pulmonology  Progress Note    Patient Name: Og Garcia  MRN: [id number]  Admission Date: 12/23/2021  Hospital Length of Stay: 103 days  Code Status: Prior  Attending Provider: Cecil Abernathy DO  Primary Care Provider: Hector Arndt DNP, FNP-C   Principal Problem: Denice gangrene    Subjective:     Interval History: No acute events overnight. The patient is currently resting comfortably. He looks much more comfortable this am. He is currently afebrile and vital signs are stable.    Objective:     Vital Signs (Most Recent):  Temp: 100.1 °F (37.8 °C) (04/05/22 1153)  Pulse: 100 (04/05/22 1249)  Resp: (!) 24 (04/05/22 1249)  BP: (!) 114/55 (04/05/22 0830)  SpO2: 99 % (04/05/22 1249)   Vital Signs (24h Range):  Temp:  [98.5 °F (36.9 °C)-101.9 °F (38.8 °C)] 100.1 °F (37.8 °C)  Pulse:  [] 100  Resp:  [13-44] 24  SpO2:  [94 %-100 %] 99 %  BP: ()/(49-95) 114/55     Weight: 72.3 kg (159 lb 6.3 oz)  Body mass index is 22.23 kg/m².      Intake/Output Summary (Last 24 hours) at 4/5/2022 1353  Last data filed at 4/5/2022 0800  Gross per 24 hour   Intake 1990 ml   Output 200 ml   Net 1790 ml         Physical Exam  Vitals reviewed.   Constitutional:       General: He is not in acute distress.     Appearance: He is ill-appearing.      Comments: Chronically ill appearing   HENT:      Right Ear: External ear normal.      Left Ear: External ear normal.      Mouth/Throat:      Mouth: Mucous membranes are moist.   Eyes:      Pupils: Pupils are equal, round, and reactive to light.   Cardiovascular:      Rate and Rhythm: Regular rhythm. Tachycardia present.   Pulmonary:      Breath sounds: Normal breath sounds. No wheezing, rhonchi or rales.   Abdominal:      Palpations: Abdomen is soft.   Musculoskeletal:         General: Normal range of motion.      Cervical back: Normal range of motion and neck supple.   Skin:     General: Skin is warm and dry.      Capillary Refill:  Capillary refill takes less than 2 seconds.   Neurological:      Mental Status: He is alert. Mental status is at baseline.   Psychiatric:         Mood and Affect: Mood normal.       Vents:  Vent Mode: A/C (04/05/22 1112)  Set Rate: 14 BPM (04/05/22 1112)  Vt Set: 400 mL (04/05/22 1112)  Pressure Support: 12 cmH20 (04/04/22 0340)  PEEP/CPAP: 5 cmH20 (04/05/22 1112)  Oxygen Concentration (%): 35 (04/05/22 1112)  Peak Airway Pressure: 35 cmH2O (04/05/22 1112)  Plateau Pressure: 20 cmH20 (03/07/22 0500)  Total Ve: 7.8 mL (04/05/22 1112)  F/VT Ratio<105 (RSBI): (!) 81.78 (04/05/22 0516)    Lines/Drains/Airways       Central Venous Catheter Line  Duration                  Hemodialysis Catheter 12/30/21 0900 right internal jugular 96 days              Drain  Duration                  Colostomy 12/18/21 1030 Descending/sigmoid  days         Gastrostomy/Enterostomy 03/09/22 1436 Percutaneous endoscopic gastrostomy (PEG) midline feeding 26 days              Airway  Duration                  Surgical Airway 01/04/22 [place] 91 days              Peripheral Intravenous Line  Duration                  Peripheral IV - Single Lumen 04/04/22 1430 22 G Posterior;Right Hand <1 day         Peripheral IV - Single Lumen 04/04/22 1500 20 G Anterior;Distal;Right Upper Arm <1 day                    Significant Labs:    CBC/Anemia Profile:  Recent Labs   Lab 04/04/22  0223 04/04/22  1703   WBC 19.40*  --    HGB 6.4* 7.8*   HCT 20.1* 24.0*     --    MCV 92.6  --    RDW 14.6*  --           Chemistries:  Recent Labs   Lab 04/04/22  0931   *   K 4.1   CL 96*   CO2 25   BUN 97*   CREATININE 2.59*   CALCIUM 8.4*         All pertinent labs within the past 24 hours have been reviewed.    Significant Imaging:  I have reviewed all pertinent imaging results/findings within the past 24 hours.    Assessment/Plan:     * Denice gangrene  - s/p multiple debridements  - wound vac in place  - ID consulted for antibiotic management: He was  treated with rocephin, daptomycin, clindamycin. 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 Cazares  - 2/21 back to OR for skin grafting  - 3/9 had peg tube placed   Currently stable with no acute issues      Fever  2/28 - resp culture with pseudomonas and klebsiella  treat with zosyn for 7 days   Blood culture from 2/25 also growing Pseudomonas. Sensitive to zosyn. Will need to continue zosyn for at least 10 days total  - low grade fever since 3/28, CXR- unchanged and repeat CBC- WBC 20.57  - remove malpositioned LUE PICC. Fevers have resolved since 4/1  - blood cultures- NGTD  - does have areas of skin breakdown and some drainage from left heel wound  - if fever again, will add empiric abx for wound infections  - afebrile this am but does have documented fever. Cultures with NGTD    ESRD (end stage renal disease)  Started on HD 12/30   Continue with HD per nephrology      Type 2 diabetes mellitus without complication, without long-term current use of insulin  - accuchecks look ok  - Continue with current care      Acute blood loss anemia  No active bleeding  Hgb is down to 6.4 today  We are going to transfuse 1 unit today 4/4  Hgb is 7.8 today following transfusion yesterday    Hypertension  Blood pressure is ok  Continue with current care                   Cecil Abernathy DO  Pulmonology  [facility]

## 2022-04-05 NOTE — ASSESSMENT & PLAN NOTE
2/28 - resp culture with pseudomonas and klebsiella  treat with zosyn for 7 days   Blood culture from 2/25 also growing Pseudomonas. Sensitive to zosyn. Will need to continue zosyn for at least 10 days total  - low grade fever since 3/28, CXR- unchanged and repeat CBC- WBC 20.57  - remove malpositioned LUE PICC. Fevers have resolved since 4/1  - blood cultures- NGTD  - does have areas of skin breakdown and some drainage from left heel wound  - if fever again, will add empiric abx for wound infections  - afebrile this am but does have documented fever. Cultures with NGTD

## 2022-04-05 NOTE — NURSING
1130: wound care at bedside changing dressing.  1145: wound culture done and sent to lab by wound care team.

## 2022-04-05 NOTE — PROGRESS NOTES
[facility]  Nephrology  Progress Note    Patient Name: Og Garcia  MRN: [id number]  Admission Date: 12/23/2021  Hospital Length of Stay: 102 days  Attending Provider: Cecil Abernathy DO   Primary Care Physician: Hector Arndt DNP, FNP-C  Principal Problem:Denice gangrene    Subjective:     HPI: The patient is known from the previous nephrology consult at [facility].  He is no at Specialty and continues to need dialysis support.      Interval History: The patient is resting.  He is on trach collar.  Continuing with dialysis.    Review of patient's allergies indicates:  No Known Allergies  Current Facility-Administered Medications   Medication Frequency    0.9%  NaCl infusion (for blood administration) Q24H PRN    0.9%  NaCl infusion PRN    acetaminophen tablet 500 mg Q6H PRN    acetic acid 0.25 % irrigation PRN    albuterol nebulizer solution 2.5 mg Q4H PRN    albuterol-ipratropium 2.5 mg-0.5 mg/3 mL nebulizer solution 3 mL Q6H    bisacodyL EC tablet 10 mg Daily PRN    calcium gluconate 1 g in dextrose 5 % in water (D5W) 5 % 100 mL IVPB (MB+) Once    dextrose 50% injection 12.5 g PRN    diltiaZEM tablet 90 mg Q6H    glucagon (human recombinant) injection 1 mg PRN    guaiFENesin 100 mg/5 ml syrup 200 mg Q6H    heparin (porcine) injection 4,000 Units PRN    heparin (porcine) injection 5,000 Units Q8H    hydrALAZINE tablet 25 mg Q8H    insulin aspart U-100 injection 1-10 Units Q6H    insulin detemir U-100 injection 25 Units QHS    morphine injection 4 mg Q4H PRN    ondansetron injection 8 mg Q6H PRN    pantoprazole suspension 40 mg Daily    predniSONE tablet 5 mg Daily    sevelamer carbonate pwpk 0.8 g TID WM    silver sulfADIAZINE 1% cream Daily PRN    simethicone chewable tablet 80 mg TID PRN       Objective:     Vital Signs (Most Recent):  Temp: (P) 100 °F (37.8 °C) (04/04/22 1900)  Pulse: 101 (04/04/22 1900)  Resp: (!) 26 (04/04/22 1900)  BP: 111/60 (04/04/22  1900)  SpO2: 100 % (04/04/22 1900)  O2 Device (Oxygen Therapy): ventilator (04/04/22 1710)   Vital Signs (24h Range):  Temp:  [98.2 °F (36.8 °C)-100 °F (37.8 °C)] (P) 100 °F (37.8 °C)  Pulse:  [] 101  Resp:  [11-44] 26  SpO2:  [94 %-100 %] 100 %  BP: (111-156)/(57-85) 111/60     Weight: 74.2 kg (163 lb 9.3 oz) (04/04/22 0500)  Body mass index is 22.81 kg/m².  Body surface area is 1.93 meters squared.    I/O last 3 completed shifts:  In: 2990 [P.O.:120; Blood:360; Other:380; NG/GT:2130]  Out: 3280 [Other:2880; Stool:400]    Physical Exam  Vitals reviewed.   HENT:      Head: Normocephalic.   Cardiovascular:      Rate and Rhythm: Regular rhythm.   Pulmonary:      Effort: Pulmonary effort is normal.   Abdominal:      General: Abdomen is flat.       Significant Labs:  BMP:   Recent Labs   Lab 04/04/22  0931   GLU 95   *   K 4.1   CL 96*   CO2 25   BUN 97*   CREATININE 2.59*   CALCIUM 8.4*     CBC:   Recent Labs   Lab 04/04/22  0223 04/04/22  1703   WBC 19.40*  --    RBC 2.17*  --    HGB 6.4* 7.8*   HCT 20.1* 24.0*     --    MCV 92.6  --    MCH 29.5  --    MCHC 31.8*  --         Significant Imaging:  Labs: Reviewed    Assessment/Plan:     ESRD (end stage renal disease)  3/22/2022 Continue with dialysis as scheduled.  3/24/2022 The patient continues to do well with dialysis.  3/25/2022  Continue with dialysis as scheduled.  3/28/2022 Dialysis as scheduled.  3/29/2022  Continue with dialysis schedule  3/30/2022  At this time, continue with dialysis as scheduled.  3/31/2022  Dialysis sessions go well.  Continue with this therapy.  4/4/2022  Continue with dialysis.        Thank you for your consult. I will follow-up with patient. Please contact us if you have any additional questions.    Edwin Pryor Jr, MD  Nephrology  [facility]

## 2022-04-05 NOTE — SUBJECTIVE & OBJECTIVE
Interval History: No acute events overnight. The patient is currently resting comfortably. He looks much more comfortable this am. He is currently afebrile and vital signs are stable.    Objective:     Vital Signs (Most Recent):  Temp: 100.1 °F (37.8 °C) (04/05/22 1153)  Pulse: 100 (04/05/22 1249)  Resp: (!) 24 (04/05/22 1249)  BP: (!) 114/55 (04/05/22 0830)  SpO2: 99 % (04/05/22 1249)   Vital Signs (24h Range):  Temp:  [98.5 °F (36.9 °C)-101.9 °F (38.8 °C)] 100.1 °F (37.8 °C)  Pulse:  [] 100  Resp:  [13-44] 24  SpO2:  [94 %-100 %] 99 %  BP: ()/(49-95) 114/55     Weight: 72.3 kg (159 lb 6.3 oz)  Body mass index is 22.23 kg/m².      Intake/Output Summary (Last 24 hours) at 4/5/2022 1353  Last data filed at 4/5/2022 0800  Gross per 24 hour   Intake 1990 ml   Output 200 ml   Net 1790 ml         Physical Exam  Vitals reviewed.   Constitutional:       General: He is not in acute distress.     Appearance: He is ill-appearing.      Comments: Chronically ill appearing   HENT:      Right Ear: External ear normal.      Left Ear: External ear normal.      Mouth/Throat:      Mouth: Mucous membranes are moist.   Eyes:      Pupils: Pupils are equal, round, and reactive to light.   Cardiovascular:      Rate and Rhythm: Regular rhythm. Tachycardia present.   Pulmonary:      Breath sounds: Normal breath sounds. No wheezing, rhonchi or rales.   Abdominal:      Palpations: Abdomen is soft.   Musculoskeletal:         General: Normal range of motion.      Cervical back: Normal range of motion and neck supple.   Skin:     General: Skin is warm and dry.      Capillary Refill: Capillary refill takes less than 2 seconds.   Neurological:      Mental Status: He is alert. Mental status is at baseline.   Psychiatric:         Mood and Affect: Mood normal.       Vents:  Vent Mode: A/C (04/05/22 1112)  Set Rate: 14 BPM (04/05/22 1112)  Vt Set: 400 mL (04/05/22 1112)  Pressure Support: 12 cmH20 (04/04/22 0340)  PEEP/CPAP: 5 cmH20  (04/05/22 1112)  Oxygen Concentration (%): 35 (04/05/22 1112)  Peak Airway Pressure: 35 cmH2O (04/05/22 1112)  Plateau Pressure: 20 cmH20 (03/07/22 0500)  Total Ve: 7.8 mL (04/05/22 1112)  F/VT Ratio<105 (RSBI): (!) 81.78 (04/05/22 0516)    Lines/Drains/Airways       Central Venous Catheter Line  Duration                  Hemodialysis Catheter 12/30/21 0900 right internal jugular 96 days              Drain  Duration                  Colostomy 12/18/21 1030 Descending/sigmoid  days         Gastrostomy/Enterostomy 03/09/22 1436 Percutaneous endoscopic gastrostomy (PEG) midline feeding 26 days              Airway  Duration                  Surgical Airway 01/04/22 [place] 91 days              Peripheral Intravenous Line  Duration                  Peripheral IV - Single Lumen 04/04/22 1430 22 G Posterior;Right Hand <1 day         Peripheral IV - Single Lumen 04/04/22 1500 20 G Anterior;Distal;Right Upper Arm <1 day                    Significant Labs:    CBC/Anemia Profile:  Recent Labs   Lab 04/04/22  0223 04/04/22  1703   WBC 19.40*  --    HGB 6.4* 7.8*   HCT 20.1* 24.0*     --    MCV 92.6  --    RDW 14.6*  --           Chemistries:  Recent Labs   Lab 04/04/22  0931   *   K 4.1   CL 96*   CO2 25   BUN 97*   CREATININE 2.59*   CALCIUM 8.4*         All pertinent labs within the past 24 hours have been reviewed.    Significant Imaging:  I have reviewed all pertinent imaging results/findings within the past 24 hours.

## 2022-04-05 NOTE — ASSESSMENT & PLAN NOTE
3/22/2022 Continue with dialysis as scheduled.  3/24/2022 The patient continues to do well with dialysis.  3/25/2022  Continue with dialysis as scheduled.  3/28/2022 Dialysis as scheduled.  3/29/2022  Continue with dialysis schedule  3/30/2022  At this time, continue with dialysis as scheduled.  3/31/2022  Dialysis sessions go well.  Continue with this therapy.  4/4/2022  Continue with dialysis.

## 2022-04-05 NOTE — PROGRESS NOTES
[facility]  Adult Nutrition  Follow-up Note         Reason for Assessment  Reason For Assessment: RD follow-up  Nutrition Risk Screen: tube feeding or parenteral nutrition  Malnutrition  Is Patient Malnourished: No  Nutrition Diagnosis  Inadequate oral intake   related to Decreased ability to consume sufficient energy as evidenced by pt on vent    Nutrition Diagnosis Status: Chronic/ continues      Nutrition Risk  Level of Risk/Frequency of Follow-up: high   Chewing or Swallowing Difficulty?: Swallowing difficulty  Estimated/Assessed Needs  RMR ([place]-St. Jeor Equation): 1525.13 Activity Factor: 1 Injury Factor: 1.2   Total Ve: 7.6 mL Temp: 98.5 °F (36.9 °C)Oral  Weight Used For Calorie Calculations: 75.3 kg (166 lb 0.1 oz)   Energy Need Method: [facility] Energy Calorie Requirements (kcal): 2105  Weight Used For Protein Calculations: 83.3 kg (183 lb 10.3 oz)  Protein Requirements: 100-125  Estimated Fluid Requirement Method: RDA Method Fluid Requirements (mL): 2105  RDA Method (mL): 2105     Nutrition Prescription / Recommendations  Recommendation/Intervention: PEG tube feeding: Nepro @ ml/hr; H20 flush of 100ml q 4hr  Goals: TF tolerance, wound healing; will meet estimated nutritional needs; wt maintenance  Nutrition Goal Status: progressing towards goal  Communication of RD Recs: reviewed with RN  Current Diet Order: PEG tube feeding: Nepro @ ml/hr; H20 flush of 100ml q 4hr  Nutrition Order Comments: PEG tube feeding: Nepro @ 60ml/hr; H20 flush of 100ml q 4hr  Current Nutrition Support Formula Ordered: Nepro  Current Nutrition Support Rate Ordered: 60 (ml)  Current Nutrition Support Frequency Ordered: hourly  Recommended Diet: Enteral Nutrition PEG tube feeding: Nepro @ ml/hr; H20 flush of 100ml q 4hr  Recommended Oral Supplement: No Oral Supplements   Education Recommended: No  Monitor and Evaluation  % current Intake: Enteral Nutrition at goal  % intake to meet estimated needs: Enteral  Nutrition   Food and Nutrient Intake: enteral nutrition intake  Food and Nutrient Adminstration: enteral and parenteral nutrition administration  Anthropometric Measurements: height/length, body mass index, weight, weight change  Biochemical Data, Medical Tests and Procedures: glucose/endocrine profile, electrolyte and renal panel  Nutrition-Focused Physical Findings: skin  Enteral Calories (kcal): 2592  Enteral Protein (gm): 115  Enteral (Free Water) Fluid (mL): 1037  Free Water Flush Fluid (mL): 600  Parenteral Calories (kcal): 845  Parenteral Protein (gm): 48  Parenteral Fluid (mL): 960  Lipid Calories (kcals): 500 kcals  Other Calories (kcal): 528 (propofol)  Total Calories (kcal): 2160  Total Calories (kcal/kg): 2612  % Kcal Needs: 100  Total Protein (gm): 135  % Protein Needs: 100  IV Fluid (mL): 1764  Total Fluid Intake (mL): 1637  Energy Calories Required: meeting needs  Protein Required: meeting needs  Fluid Required: meeting needs  Tolerance: tolerating  Current Medical Diagnosis and Past Medical History  Diagnosis: gastrointestinal disease, infection/sepsis  Past Medical History:   Diagnosis Date    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    Hyperlipidemia     Hypertension     On mechanically assisted ventilation 12/14/2021     Nutrition/Diet History  Spiritual, Cultural Beliefs, Worship Practices, Values that Affect Care: no  Food Allergies: NKFA  Factors Affecting Nutritional Intake: None identified at this time  Lab/Procedures/Meds  Recent Labs   Lab 04/04/22  0931   *   K 4.1   BUN 97*   CREATININE 2.59*   GLU 95   CALCIUM 8.4*   CL 96*     Last A1c: No results found for: HGBA1C  Lab Results   Component Value Date    RBC 2.17 (L) 04/04/2022    HGB 7.8 (L) 04/04/2022    HCT 24.0 (L) 04/04/2022    MCV 92.6 04/04/2022    MCH 29.5 04/04/2022    MCHC 31.8 (L) 04/04/2022     Pertinent Labs Reviewed: reviewed  Pertinent Labs Comments: Hct 24.0, Na 133, Cl 96, BUN 97, CR 2.59, Alb 1.3,  Pertinent  "Medications Reviewed: reviewed  Pertinent Medications Comments: heparin, insulin  Anthropometrics  Temp: 98.5 °F (36.9 °C)  Height Method: Stated  Height: 5' 11" (180.3 cm)  Height (inches): 71 in  Weight Method: Bed Scale  Weight: 72.3 kg (159 lb 6.3 oz)  Weight (lb): 159.39 lb  Ideal Body Weight (IBW), Male: 172 lb  % Ideal Body Weight, Male (lb): 106.77 %  BMI (Calculated): 22.2  BMI Grade: 25 - 29.9 - overweight     Nutrition by Nursing  Diet/Nutrition Received: tube feeding  Intake (%): 100%  Diet/Feeding Assistance: total feed  Diet/Feeding Tolerance: good  Last Bowel Movement: 04/05/22  [REMOVED]      NG/OG Tube [place] sump 18 Fr. Left nostril-Feeding Type: continuous, by pump       Gastrostomy/Enterostomy 03/09/22 1436 Percutaneous endoscopic gastrostomy (PEG) midline feeding-Feeding Type: continuous, by pump  [REMOVED]      NG/OG Tube [place] sump 18 Fr. Left nostril-Current Rate (mL/hr): 50 mL/hr       Gastrostomy/Enterostomy 03/09/22 1436 Percutaneous endoscopic gastrostomy (PEG) midline feeding-Current Rate (mL/hr): 60 mL/hr  [REMOVED]      NG/OG Tube [place] sump 18 Fr. Left nostril-Goal Rate (mL/hr): 75 mL/hr       Gastrostomy/Enterostomy 03/09/22 1436 Percutaneous endoscopic gastrostomy (PEG) midline feeding-Goal Rate (mL/hr): 60 mL/hr  [REMOVED]      NG/OG Tube [place] sump 18 Fr. Left nostril-Formula Name: nepro 1.8       Gastrostomy/Enterostomy 03/09/22 1436 Percutaneous endoscopic gastrostomy (PEG) midline feeding-Formula Name: nepro  Nutrition Follow-Up  RD Follow-up?: Yes  Assessment and Plan  No new Assessment & Plan notes have been filed under this hospital service since the last note was generated.  Service: Nutrition     "

## 2022-04-05 NOTE — PROGRESS NOTES
[facility]  Nephrology  Progress Note    Patient Name: Og Garcia  MRN: [id number]  Admission Date: 12/23/2021  Hospital Length of Stay: 103 days  Attending Provider: Cecil Abernathy DO   Primary Care Physician: Hector Arndt DNP, FNP-C  Principal Problem:Denice gangrene    Subjective:     HPI: The patient is known from the previous nephrology consult at [facility].  He is no at Specialty and continues to need dialysis support.      Interval History: The patient is resting.  No acute changes.  He tolerated dialysis on yesterday.    Review of patient's allergies indicates:  No Known Allergies  Current Facility-Administered Medications   Medication Frequency    0.9%  NaCl infusion (for blood administration) Q24H PRN    0.9%  NaCl infusion PRN    acetaminophen tablet 500 mg Q6H PRN    acetic acid 0.25 % irrigation PRN    albuterol nebulizer solution 2.5 mg Q4H PRN    albuterol-ipratropium 2.5 mg-0.5 mg/3 mL nebulizer solution 3 mL Q6H    bisacodyL EC tablet 10 mg Daily PRN    calcium gluconate 1 g in dextrose 5 % in water (D5W) 5 % 100 mL IVPB (MB+) Once    dextrose 50% injection 12.5 g PRN    diltiaZEM tablet 90 mg Q6H    glucagon (human recombinant) injection 1 mg PRN    guaiFENesin 100 mg/5 ml syrup 200 mg Q6H    heparin (porcine) injection 4,000 Units PRN    heparin (porcine) injection 5,000 Units Q8H    hydrALAZINE tablet 25 mg Q8H    insulin aspart U-100 injection 1-10 Units Q6H    insulin detemir U-100 injection 25 Units QHS    morphine injection 4 mg Q3H PRN    ondansetron injection 8 mg Q6H PRN    pantoprazole suspension 40 mg Daily    predniSONE tablet 5 mg Daily    sevelamer carbonate pwpk 0.8 g TID WM    silver sulfADIAZINE 1% cream Daily PRN    simethicone chewable tablet 80 mg TID PRN       Objective:     Vital Signs (Most Recent):  Temp: 100.1 °F (37.8 °C) (04/05/22 1153)  Pulse: 95 (04/05/22 1345)  Resp: (!) 26 (04/05/22 1423)  BP: (!) 114/55  (04/05/22 1345)  SpO2: 100 % (04/05/22 1345)  O2 Device (Oxygen Therapy): ventilator (04/05/22 0732)   Vital Signs (24h Range):  Temp:  [98.5 °F (36.9 °C)-101.9 °F (38.8 °C)] 100.1 °F (37.8 °C)  Pulse:  [] 95  Resp:  [22-44] 26  SpO2:  [96 %-100 %] 100 %  BP: ()/(49-95) 114/55     Weight: 72.3 kg (159 lb 6.3 oz) (04/05/22 0237)  Body mass index is 22.23 kg/m².  Body surface area is 1.9 meters squared.    I/O last 3 completed shifts:  In: 3480 [P.O.:120; Blood:360; Other:380; NG/GT:2620]  Out: 3280 [Other:2880; Stool:400]    Physical Exam  Vitals reviewed.   HENT:      Head: Atraumatic.      Mouth/Throat:      Mouth: Mucous membranes are moist.   Cardiovascular:      Rate and Rhythm: Regular rhythm.   Pulmonary:      Comments: The patient is on the ventilator.  Abdominal:      General: Abdomen is flat.      Palpations: Abdomen is soft.       Significant Labs:  BMP:   Recent Labs   Lab 04/04/22  0931   GLU 95   *   K 4.1   CL 96*   CO2 25   BUN 97*   CREATININE 2.59*   CALCIUM 8.4*     CBC:   Recent Labs   Lab 04/04/22  0223 04/04/22  1703   WBC 19.40*  --    RBC 2.17*  --    HGB 6.4* 7.8*   HCT 20.1* 24.0*     --    MCV 92.6  --    MCH 29.5  --    MCHC 31.8*  --         Significant Imaging:  Labs: Reviewed    Assessment/Plan:     ESRD (end stage renal disease)  3/22/2022 Continue with dialysis as scheduled.  3/24/2022 The patient continues to do well with dialysis.  3/25/2022  Continue with dialysis as scheduled.  3/28/2022 Dialysis as scheduled.  3/29/2022  Continue with dialysis schedule  3/30/2022  At this time, continue with dialysis as scheduled.  3/31/2022  Dialysis sessions go well.  Continue with this therapy.  4/4/2022  Continue with dialysis.  4/5/2022 Continue with scheduled hemodialysis for this patient.      The patient has multiple other issues.  Thank you for your consult. I will follow-up with patient. Please contact us if you have any additional questions.    Edwin Pryor Jr,  MD  Nephrology  [facility]

## 2022-04-05 NOTE — ASSESSMENT & PLAN NOTE
3/22/2022 Continue with dialysis as scheduled.  3/24/2022 The patient continues to do well with dialysis.  3/25/2022  Continue with dialysis as scheduled.  3/28/2022 Dialysis as scheduled.  3/29/2022  Continue with dialysis schedule  3/30/2022  At this time, continue with dialysis as scheduled.  3/31/2022  Dialysis sessions go well.  Continue with this therapy.  4/4/2022  Continue with dialysis.  4/5/2022 Continue with scheduled hemodialysis for this patient.

## 2022-04-05 NOTE — ASSESSMENT & PLAN NOTE
No active bleeding  Hgb is down to 6.4 today  We are going to transfuse 1 unit today 4/4  Hgb is 7.8 today following transfusion yesterday

## 2022-04-06 PROBLEM — J96.10 CHRONIC RESPIRATORY FAILURE: Status: ACTIVE | Noted: 2022-01-01

## 2022-04-06 PROBLEM — R00.0 TACHYCARDIA: Status: ACTIVE | Noted: 2022-01-01

## 2022-04-06 NOTE — SUBJECTIVE & OBJECTIVE
Interval History: The patient underwent hemodialysis today.    Review of patient's allergies indicates:  No Known Allergies  Current Facility-Administered Medications   Medication Frequency    0.9%  NaCl infusion (for blood administration) Q24H PRN    0.9%  NaCl infusion PRN    acetaminophen tablet 500 mg Q6H PRN    acetic acid 0.25 % irrigation PRN    albuterol nebulizer solution 2.5 mg Q4H PRN    albuterol-ipratropium 2.5 mg-0.5 mg/3 mL nebulizer solution 3 mL Q6H    bisacodyL EC tablet 10 mg Daily PRN    calcium gluconate 1 g in dextrose 5 % in water (D5W) 5 % 100 mL IVPB (MB+) Once    cefTRIAXone (ROCEPHIN) 1 g in dextrose 5 % in water (D5W) 5 % 50 mL IVPB (MB+) Q24H    dextrose 50% injection 12.5 g PRN    diltiaZEM tablet 90 mg Q6H    glucagon (human recombinant) injection 1 mg PRN    guaiFENesin 100 mg/5 ml syrup 200 mg Q6H    heparin (porcine) injection 4,000 Units PRN    heparin (porcine) injection 5,000 Units Q8H    hydrALAZINE tablet 25 mg Q8H    HYDROcodone-acetaminophen 5-325 mg per tablet 1 tablet Q8H    Followed by    [START ON 4/13/2022] HYDROcodone-acetaminophen 5-325 mg per tablet 1 tablet Q12H    insulin aspart U-100 injection 1-10 Units Q6H    insulin detemir U-100 injection 25 Units QHS    morphine injection 4 mg Q3H PRN    ondansetron injection 8 mg Q6H PRN    pantoprazole suspension 40 mg Daily    predniSONE tablet 5 mg Daily    sevelamer carbonate pwpk 0.8 g TID WM    silver sulfADIAZINE 1% cream Daily PRN    simethicone chewable tablet 80 mg TID PRN       Objective:     Vital Signs (Most Recent):  Temp: (!) 100.5 °F (38.1 °C) (04/06/22 1700)  Pulse: (!) 118 (04/06/22 1400)  Resp: (!) 35 (04/06/22 1400)  BP: (!) 105/45 (04/06/22 1718)  SpO2: 100 % (04/06/22 1400)  O2 Device (Oxygen Therapy): ventilator (04/06/22 1625)   Vital Signs (24h Range):  Temp:  [98.4 °F (36.9 °C)-100.5 °F (38.1 °C)] 100.5 °F (38.1 °C)  Pulse:  [] 118  Resp:  [18-45] 35  SpO2:  [93 %-100 %] 100 %  BP:  ()/(45-81) 105/45     Weight: 74.3 kg (163 lb 12.8 oz) (04/06/22 0200)  Body mass index is 22.85 kg/m².  Body surface area is 1.93 meters squared.    I/O last 3 completed shifts:  In: 2710 [P.O.:270; NG/GT:2440]  Out: 200 [Stool:200]    Physical Exam  Vitals reviewed.   HENT:      Head: Atraumatic.   Cardiovascular:      Rate and Rhythm: Regular rhythm.   Pulmonary:      Effort: Pulmonary effort is normal.      Comments: ventilated  Abdominal:      General: Abdomen is flat.   Neurological:      Mental Status: He is alert.       Significant Labs:  BMP:   Recent Labs   Lab 04/04/22  0931   GLU 95   *   K 4.1   CL 96*   CO2 25   BUN 97*   CREATININE 2.59*   CALCIUM 8.4*     CBC:   Recent Labs   Lab 04/04/22  0223 04/04/22  1703   WBC 19.40*  --    RBC 2.17*  --    HGB 6.4* 7.8*   HCT 20.1* 24.0*     --    MCV 92.6  --    MCH 29.5  --    MCHC 31.8*  --         Significant Imaging:  Labs: Reviewed

## 2022-04-06 NOTE — ASSESSMENT & PLAN NOTE
Was doing trach collar over the weekend but had to be placed back on the vent earlier this week   - will treat his pain- hopefully this will improve his HR and then start back on weaning trails

## 2022-04-06 NOTE — NURSING
Patient in bed no acute distress noted. #8 trach intact , patient remains on assist control, no resp distress noted. Lug peg intact and infusing  nepro 1.8 at 60ml/hr without signs of discomfort. llq colostomy intact, not leaking, stoma pink, small amt of brown stool in bag. Dressings intact to sacral, austyn feet and right leg. scd hose intact feet elevated on pillows. No change In patient status. Safety measures intact, will report to oncoming nurse.

## 2022-04-06 NOTE — PROGRESS NOTES
[facility]  Nephrology  Progress Note    Patient Name: Og Garcia  MRN: [id number]  Admission Date: 12/23/2021  Hospital Length of Stay: 104 days  Attending Provider: Cecil Abernathy DO   Primary Care Physician: Hector Arndt DNP, FNP-C  Principal Problem:Denice gangrene    Subjective:     HPI: The patient is known from the previous nephrology consult at [facility].  He is no at Specialty and continues to need dialysis support.      Interval History: The patient underwent hemodialysis today.    Review of patient's allergies indicates:  No Known Allergies  Current Facility-Administered Medications   Medication Frequency    0.9%  NaCl infusion (for blood administration) Q24H PRN    0.9%  NaCl infusion PRN    acetaminophen tablet 500 mg Q6H PRN    acetic acid 0.25 % irrigation PRN    albuterol nebulizer solution 2.5 mg Q4H PRN    albuterol-ipratropium 2.5 mg-0.5 mg/3 mL nebulizer solution 3 mL Q6H    bisacodyL EC tablet 10 mg Daily PRN    calcium gluconate 1 g in dextrose 5 % in water (D5W) 5 % 100 mL IVPB (MB+) Once    cefTRIAXone (ROCEPHIN) 1 g in dextrose 5 % in water (D5W) 5 % 50 mL IVPB (MB+) Q24H    dextrose 50% injection 12.5 g PRN    diltiaZEM tablet 90 mg Q6H    glucagon (human recombinant) injection 1 mg PRN    guaiFENesin 100 mg/5 ml syrup 200 mg Q6H    heparin (porcine) injection 4,000 Units PRN    heparin (porcine) injection 5,000 Units Q8H    hydrALAZINE tablet 25 mg Q8H    HYDROcodone-acetaminophen 5-325 mg per tablet 1 tablet Q8H    Followed by    [START ON 4/13/2022] HYDROcodone-acetaminophen 5-325 mg per tablet 1 tablet Q12H    insulin aspart U-100 injection 1-10 Units Q6H    insulin detemir U-100 injection 25 Units QHS    morphine injection 4 mg Q3H PRN    ondansetron injection 8 mg Q6H PRN    pantoprazole suspension 40 mg Daily    predniSONE tablet 5 mg Daily    sevelamer carbonate pwpk 0.8 g TID WM    silver sulfADIAZINE 1% cream Daily PRN     simethicone chewable tablet 80 mg TID PRN       Objective:     Vital Signs (Most Recent):  Temp: (!) 100.5 °F (38.1 °C) (04/06/22 1700)  Pulse: (!) 118 (04/06/22 1400)  Resp: (!) 35 (04/06/22 1400)  BP: (!) 105/45 (04/06/22 1718)  SpO2: 100 % (04/06/22 1400)  O2 Device (Oxygen Therapy): ventilator (04/06/22 1625)   Vital Signs (24h Range):  Temp:  [98.4 °F (36.9 °C)-100.5 °F (38.1 °C)] 100.5 °F (38.1 °C)  Pulse:  [] 118  Resp:  [18-45] 35  SpO2:  [93 %-100 %] 100 %  BP: ()/(45-81) 105/45     Weight: 74.3 kg (163 lb 12.8 oz) (04/06/22 0200)  Body mass index is 22.85 kg/m².  Body surface area is 1.93 meters squared.    I/O last 3 completed shifts:  In: 2710 [P.O.:270; NG/GT:2440]  Out: 200 [Stool:200]    Physical Exam  Vitals reviewed.   HENT:      Head: Atraumatic.   Cardiovascular:      Rate and Rhythm: Regular rhythm.   Pulmonary:      Effort: Pulmonary effort is normal.      Comments: ventilated  Abdominal:      General: Abdomen is flat.   Neurological:      Mental Status: He is alert.       Significant Labs:  BMP:   Recent Labs   Lab 04/04/22  0931   GLU 95   *   K 4.1   CL 96*   CO2 25   BUN 97*   CREATININE 2.59*   CALCIUM 8.4*     CBC:   Recent Labs   Lab 04/04/22  0223 04/04/22  1703   WBC 19.40*  --    RBC 2.17*  --    HGB 6.4* 7.8*   HCT 20.1* 24.0*     --    MCV 92.6  --    MCH 29.5  --    MCHC 31.8*  --         Significant Imaging:  Labs: Reviewed    Assessment/Plan:     ESRD (end stage renal disease)  3/22/2022 Continue with dialysis as scheduled.  3/24/2022 The patient continues to do well with dialysis.  3/25/2022  Continue with dialysis as scheduled.  3/28/2022 Dialysis as scheduled.  3/29/2022  Continue with dialysis schedule  3/30/2022  At this time, continue with dialysis as scheduled.  3/31/2022  Dialysis sessions go well.  Continue with this therapy.  4/4/2022  Continue with dialysis.  4/5/2022 Continue with scheduled hemodialysis for this patient.  4/6/2022 Dialysis  as scheduled.        Thank you for your consult. I will follow-up with patient. Please contact us if you have any additional questions.    Edwin Pryor Jr, MD  Nephrology  [facility]

## 2022-04-06 NOTE — ASSESSMENT & PLAN NOTE
2/28 - resp culture with pseudomonas and klebsiella  treat with zosyn for 7 days   Blood culture from 2/25 also growing Pseudomonas. Sensitive to zosyn. Will need to continue zosyn for at least 10 days total  - low grade fever since 3/28, CXR- unchanged and repeat CBC- WBC 20.57  - removed malpositioned LUE PICC.  - blood cultures- NGTD after 72  Hours   -4/6-  Temp 101 at noon yesterday- wound cultures with heavy growth gram neg bacilli - will go ahead and add Rocephin and continue to follow cultures

## 2022-04-06 NOTE — PROGRESS NOTES
[facility]  Pulmonology  Progress Note    Patient Name: Og Garcia  MRN: [id number]  Admission Date: 12/23/2021  Hospital Length of Stay: 104 days  Code Status: Prior  Attending Provider: Cecil Abernathy DO  Primary Care Provider: Hector Arndt DNP, FNP-C   Principal Problem: Denice gangrene    Subjective:     Interval History: awake, alert, on HD     Objective:     Vital Signs (Most Recent):  Temp: 99.5 °F (37.5 °C) (04/06/22 0700)  Pulse: (!) 123 (04/06/22 0812)  Resp: (!) 42 (04/06/22 0812)  BP: (!) 109/51 (04/06/22 0800)  SpO2: (!) 93 % (04/06/22 0812)   Vital Signs (24h Range):  Temp:  [98.4 °F (36.9 °C)-99.8 °F (37.7 °C)] 99.5 °F (37.5 °C)  Pulse:  [] 123  Resp:  [15-42] 42  SpO2:  [93 %-100 %] 93 %  BP: ()/(46-81) 109/51     Weight: 74.3 kg (163 lb 12.8 oz)  Body mass index is 22.85 kg/m².      Intake/Output Summary (Last 24 hours) at 4/6/2022 1218  Last data filed at 4/6/2022 1120  Gross per 24 hour   Intake 1340 ml   Output 200 ml   Net 1140 ml       Physical Exam  Vitals reviewed.   Constitutional:       General: He is not in acute distress.     Comments: Chronically ill appearing   HENT:      Right Ear: External ear normal.      Left Ear: External ear normal.      Mouth/Throat:      Mouth: Mucous membranes are moist.   Eyes:      Pupils: Pupils are equal, round, and reactive to light.   Cardiovascular:      Rate and Rhythm: Regular rhythm. Tachycardia present.   Pulmonary:      Breath sounds: Rhonchi present. No wheezing or rales.   Abdominal:      Palpations: Abdomen is soft.   Musculoskeletal:         General: Normal range of motion.      Cervical back: Normal range of motion and neck supple.   Skin:     General: Skin is warm and dry.      Capillary Refill: Capillary refill takes less than 2 seconds.   Neurological:      Mental Status: He is alert. Mental status is at baseline.   Psychiatric:         Mood and Affect: Mood normal.       Vents:  Vent Mode:  A/C (04/06/22 0344)  Set Rate: 14 BPM (04/06/22 0344)  Vt Set: 480 mL (04/06/22 0344)  Pressure Support: 12 cmH20 (04/04/22 0340)  PEEP/CPAP: 5 cmH20 (04/06/22 0344)  Oxygen Concentration (%): 35 (04/06/22 0344)  Peak Airway Pressure: 17 cmH2O (04/06/22 0344)  Plateau Pressure: 20 cmH20 (03/07/22 0500)  Total Ve: 7.2 mL (04/06/22 0344)  F/VT Ratio<105 (RSBI): (!) 81.74 (04/06/22 0344)    Lines/Drains/Airways       Central Venous Catheter Line  Duration                  Hemodialysis Catheter 12/30/21 0900 right internal jugular 97 days              Drain  Duration                  Colostomy 12/18/21 1030 Descending/sigmoid  days         Gastrostomy/Enterostomy 03/09/22 1436 Percutaneous endoscopic gastrostomy (PEG) midline feeding 27 days              Airway  Duration                  Surgical Airway 01/04/22 Shiley 92 days              Peripheral Intravenous Line  Duration                  Peripheral IV - Single Lumen 04/04/22 1430 22 G Posterior;Right Hand 1 day         Peripheral IV - Single Lumen 04/04/22 1500 20 G Anterior;Distal;Right Upper Arm 1 day                    Significant Labs:    CBC/Anemia Profile:  Recent Labs   Lab 04/04/22  1703   HGB 7.8*   HCT 24.0*        Chemistries:  No results for input(s): NA, K, CL, CO2, BUN, CREATININE, CALCIUM, ALBUMIN, PROT, BILITOT, ALKPHOS, ALT, AST, GLUCOSE, MG, PHOS in the last 48 hours.    All pertinent labs within the past 24 hours have been reviewed.    Significant Imaging:  I have reviewed all pertinent imaging results/findings within the past 24 hours.    Assessment/Plan:     * Denice gangrene  - s/p multiple debridements  - wound vac in place  - ID consulted for antibiotic management: He was treated with rocephin, daptomycin, clindamycin. 12/21- change clindamycin to ampicillin and treat for another week  - remains on ampicillin, this was changed to merrem 1/9  - pathology with fungal species consistent with mucormycosis initiated on amphotericin  -   patient went to OR on 1/10 and 1/12 for debridement  - 2/9- antibiotics and amphotericin completed  - 2/17 back to OR with Dr. Cazares  - 2/21 back to OR for skin grafting  - 3/9 had peg tube placed   Currently stable with no acute issues      Chronic respiratory failure  Was doing trach collar over the weekend but had to be placed back on the vent earlier this week   - will treat his pain- hopefully this will improve his HR and then start back on weaning trails     Tachycardia  Treated anemia   - concern this may be releated to pain and pain medication withdraw - his fentanyl patch was discontinued over the weekend which he has been on for weeks.  -plan - will start PO pain medication q8hrs then decrease to q12 hours after 7 days.     Fever  2/28 - resp culture with pseudomonas and klebsiella  treat with zosyn for 7 days   Blood culture from 2/25 also growing Pseudomonas. Sensitive to zosyn. Will need to continue zosyn for at least 10 days total  - low grade fever since 3/28, CXR- unchanged and repeat CBC- WBC 20.57  - removed malpositioned LUE PICC.  - blood cultures- NGTD after 72  Hours   -4/6-  Temp 101 at noon yesterday- wound cultures with heavy growth gram neg bacilli - will go ahead and add Rocephin and continue to follow cultures      ESRD (end stage renal disease)  Started on HD 12/30   Continue with HD per nephrology      Type 2 diabetes mellitus without complication, without long-term current use of insulin  - accuchecks look ok  - Continue with current care      Acute blood loss anemia  No active bleeding    Hgb is 7.8 today following transfusion 04/04               FLORESITA Shafer-ACNP  Pulmonology  [facility]

## 2022-04-06 NOTE — PLAN OF CARE
Problem: Adult Inpatient Plan of Care  Goal: Plan of Care Review  Outcome: Ongoing, Not Progressing  Goal: Patient-Specific Goal (Individualized)  Outcome: Ongoing, Not Progressing  Goal: Absence of Hospital-Acquired Illness or Injury  Outcome: Ongoing, Not Progressing  Goal: Optimal Comfort and Wellbeing  Outcome: Ongoing, Not Progressing  Goal: Readiness for Transition of Care  Outcome: Ongoing, Not Progressing     Problem: Adjustment to Illness (Sepsis/Septic Shock)  Goal: Optimal Coping  Outcome: Ongoing, Not Progressing     Problem: Bleeding (Sepsis/Septic Shock)  Goal: Absence of Bleeding  Outcome: Ongoing, Not Progressing     Problem: Glycemic Control Impaired (Sepsis/Septic Shock)  Goal: Blood Glucose Level Within Desired Range  Outcome: Ongoing, Not Progressing     Problem: Infection Progression (Sepsis/Septic Shock)  Goal: Absence of Infection Signs and Symptoms  Outcome: Ongoing, Not Progressing     Problem: Nutrition Impaired (Sepsis/Septic Shock)  Goal: Optimal Nutrition Intake  Outcome: Ongoing, Not Progressing     Problem: Fluid and Electrolyte Imbalance (Acute Kidney Injury/Impairment)  Goal: Fluid and Electrolyte Balance  Outcome: Ongoing, Not Progressing     Problem: Oral Intake Inadequate (Acute Kidney Injury/Impairment)  Goal: Optimal Nutrition Intake  Outcome: Ongoing, Not Progressing     Problem: Renal Function Impairment (Acute Kidney Injury/Impairment)  Goal: Effective Renal Function  Outcome: Ongoing, Not Progressing     Problem: Infection  Goal: Absence of Infection Signs and Symptoms  Outcome: Ongoing, Not Progressing     Problem: Fall Injury Risk  Goal: Absence of Fall and Fall-Related Injury  Outcome: Ongoing, Not Progressing     Problem: Communication Impairment (Mechanical Ventilation, Invasive)  Goal: Effective Communication  Outcome: Ongoing, Not Progressing     Problem: Device-Related Complication Risk (Mechanical Ventilation, Invasive)  Goal: Optimal Device Function  Outcome:  Ongoing, Not Progressing     Problem: Inability to Wean (Mechanical Ventilation, Invasive)  Goal: Mechanical Ventilation Liberation  Outcome: Ongoing, Not Progressing     Problem: Nutrition Impairment (Mechanical Ventilation, Invasive)  Goal: Optimal Nutrition Delivery  Outcome: Ongoing, Not Progressing     Problem: Skin and Tissue Injury (Mechanical Ventilation, Invasive)  Goal: Absence of Device-Related Skin and Tissue Injury  Outcome: Ongoing, Not Progressing     Problem: Ventilator-Induced Lung Injury (Mechanical Ventilation, Invasive)  Goal: Absence of Ventilator-Induced Lung Injury  Outcome: Ongoing, Not Progressing     Problem: Communication Impairment (Artificial Airway)  Goal: Effective Communication  Outcome: Ongoing, Not Progressing     Problem: Device-Related Complication Risk (Artificial Airway)  Goal: Optimal Device Function  Outcome: Ongoing, Not Progressing     Problem: Skin and Tissue Injury (Artificial Airway)  Goal: Absence of Device-Related Skin or Tissue Injury  Outcome: Ongoing, Not Progressing     Problem: Noninvasive Ventilation Acute  Goal: Effective Unassisted Ventilation and Oxygenation  Outcome: Ongoing, Not Progressing     Problem: Skin Injury Risk Increased  Goal: Skin Health and Integrity  Outcome: Ongoing, Not Progressing     Problem: Device-Related Complication Risk (Hemodialysis)  Goal: Safe, Effective Therapy Delivery  Outcome: Ongoing, Not Progressing     Problem: Hemodynamic Instability (Hemodialysis)  Goal: Effective Tissue Perfusion  Outcome: Ongoing, Not Progressing     Problem: Infection (Hemodialysis)  Goal: Absence of Infection Signs and Symptoms  Outcome: Ongoing, Not Progressing     Problem: Diabetes Comorbidity  Goal: Blood Glucose Level Within Targeted Range  Outcome: Ongoing, Not Progressing     Problem: Impaired Wound Healing  Goal: Optimal Wound Healing  Outcome: Ongoing, Not Progressing     Problem: Impaired Wound Healing  Goal: Optimal Wound Healing  Outcome:  Ongoing, Not Progressing     Problem: Gas Exchange Impaired  Goal: Optimal Gas Exchange  Outcome: Ongoing, Not Progressing    No  change in patient status, will continue to monitor.

## 2022-04-06 NOTE — ASSESSMENT & PLAN NOTE
Treated anemia   - concern this may be releated to pain and pain medication withdraw - his fentanyl patch was discontinued over the weekend which he has been on for weeks.  -plan - will start PO pain medication q8hrs then decrease to q12 hours after 7 days.

## 2022-04-06 NOTE — SUBJECTIVE & OBJECTIVE
Interval History: awake, alert, on HD     Objective:     Vital Signs (Most Recent):  Temp: 99.5 °F (37.5 °C) (04/06/22 0700)  Pulse: (!) 123 (04/06/22 0812)  Resp: (!) 42 (04/06/22 0812)  BP: (!) 109/51 (04/06/22 0800)  SpO2: (!) 93 % (04/06/22 0812)   Vital Signs (24h Range):  Temp:  [98.4 °F (36.9 °C)-99.8 °F (37.7 °C)] 99.5 °F (37.5 °C)  Pulse:  [] 123  Resp:  [15-42] 42  SpO2:  [93 %-100 %] 93 %  BP: ()/(46-81) 109/51     Weight: 74.3 kg (163 lb 12.8 oz)  Body mass index is 22.85 kg/m².      Intake/Output Summary (Last 24 hours) at 4/6/2022 1218  Last data filed at 4/6/2022 1120  Gross per 24 hour   Intake 1340 ml   Output 200 ml   Net 1140 ml       Physical Exam  Vitals reviewed.   Constitutional:       General: He is not in acute distress.     Comments: Chronically ill appearing   HENT:      Right Ear: External ear normal.      Left Ear: External ear normal.      Mouth/Throat:      Mouth: Mucous membranes are moist.   Eyes:      Pupils: Pupils are equal, round, and reactive to light.   Cardiovascular:      Rate and Rhythm: Regular rhythm. Tachycardia present.   Pulmonary:      Breath sounds: Rhonchi present. No wheezing or rales.   Abdominal:      Palpations: Abdomen is soft.   Musculoskeletal:         General: Normal range of motion.      Cervical back: Normal range of motion and neck supple.   Skin:     General: Skin is warm and dry.      Capillary Refill: Capillary refill takes less than 2 seconds.   Neurological:      Mental Status: He is alert. Mental status is at baseline.   Psychiatric:         Mood and Affect: Mood normal.       Vents:  Vent Mode: A/C (04/06/22 0344)  Set Rate: 14 BPM (04/06/22 0344)  Vt Set: 480 mL (04/06/22 0344)  Pressure Support: 12 cmH20 (04/04/22 0340)  PEEP/CPAP: 5 cmH20 (04/06/22 0344)  Oxygen Concentration (%): 35 (04/06/22 0344)  Peak Airway Pressure: 17 cmH2O (04/06/22 0344)  Plateau Pressure: 20 cmH20 (03/07/22 0500)  Total Ve: 7.2 mL (04/06/22 0344)  F/VT  Ratio<105 (RSBI): (!) 81.74 (04/06/22 0344)    Lines/Drains/Airways       Central Venous Catheter Line  Duration                  Hemodialysis Catheter 12/30/21 0900 right internal jugular 97 days              Drain  Duration                  Colostomy 12/18/21 1030 Descending/sigmoid  days         Gastrostomy/Enterostomy 03/09/22 1436 Percutaneous endoscopic gastrostomy (PEG) midline feeding 27 days              Airway  Duration                  Surgical Airway 01/04/22 Shiley 92 days              Peripheral Intravenous Line  Duration                  Peripheral IV - Single Lumen 04/04/22 1430 22 G Posterior;Right Hand 1 day         Peripheral IV - Single Lumen 04/04/22 1500 20 G Anterior;Distal;Right Upper Arm 1 day                    Significant Labs:    CBC/Anemia Profile:  Recent Labs   Lab 04/04/22  1703   HGB 7.8*   HCT 24.0*        Chemistries:  No results for input(s): NA, K, CL, CO2, BUN, CREATININE, CALCIUM, ALBUMIN, PROT, BILITOT, ALKPHOS, ALT, AST, GLUCOSE, MG, PHOS in the last 48 hours.    All pertinent labs within the past 24 hours have been reviewed.    Significant Imaging:  I have reviewed all pertinent imaging results/findings within the past 24 hours.

## 2022-04-06 NOTE — ASSESSMENT & PLAN NOTE
3/22/2022 Continue with dialysis as scheduled.  3/24/2022 The patient continues to do well with dialysis.  3/25/2022  Continue with dialysis as scheduled.  3/28/2022 Dialysis as scheduled.  3/29/2022  Continue with dialysis schedule  3/30/2022  At this time, continue with dialysis as scheduled.  3/31/2022  Dialysis sessions go well.  Continue with this therapy.  4/4/2022  Continue with dialysis.  4/5/2022 Continue with scheduled hemodialysis for this patient.  4/6/2022 Dialysis as scheduled.

## 2022-04-07 NOTE — ASSESSMENT & PLAN NOTE
2/28 - resp culture with pseudomonas and klebsiella  treat with zosyn for 7 days   Blood culture from 2/25 also growing Pseudomonas. Sensitive to zosyn. Will need to continue zosyn for at least 10 days total  - low grade fever since 3/28, CXR- unchanged and repeat CBC- WBC 20.57  - removed malpositioned LUE PICC.  - blood cultures- NGTD after 72  Hours   -4/6-  Temp 101 at noon yesterday- wound cultures with heavy growth gram neg bacilli - will go ahead and add Rocephin and continue to follow cultures    4/7- febrile this morning - added clinda given his aspiration event

## 2022-04-07 NOTE — SUBJECTIVE & OBJECTIVE
Interval History: febrile this morning. Vomited ans aspirated yesterday.     Objective:     Vital Signs (Most Recent):  Temp: 99 °F (37.2 °C) (04/07/22 1100)  Pulse: 100 (04/07/22 1115)  Resp: (!) 30 (04/07/22 1115)  BP: (!) 90/51 (04/07/22 1115)  SpO2: 100 % (04/07/22 1115)   Vital Signs (24h Range):  Temp:  [98.2 °F (36.8 °C)-101.5 °F (38.6 °C)] 99 °F (37.2 °C)  Pulse:  [] 100  Resp:  [15-45] 30  SpO2:  [100 %] 100 %  BP: ()/(41-58) 90/51     Weight: 75.9 kg (167 lb 5.3 oz)  Body mass index is 23.34 kg/m².      Intake/Output Summary (Last 24 hours) at 4/7/2022 1226  Last data filed at 4/7/2022 1110  Gross per 24 hour   Intake 3030 ml   Output 500 ml   Net 2530 ml       Physical Exam  Vitals reviewed.   Constitutional:       General: He is not in acute distress.     Comments: Chronically ill appearing   HENT:      Right Ear: External ear normal.      Left Ear: External ear normal.      Mouth/Throat:      Mouth: Mucous membranes are moist.   Eyes:      Pupils: Pupils are equal, round, and reactive to light.   Cardiovascular:      Rate and Rhythm: Regular rhythm.   Pulmonary:      Breath sounds: Rhonchi present. No wheezing or rales.   Abdominal:      Palpations: Abdomen is soft.   Musculoskeletal:         General: Normal range of motion.      Cervical back: Normal range of motion and neck supple.   Skin:     General: Skin is warm and dry.      Capillary Refill: Capillary refill takes less than 2 seconds.   Neurological:      Mental Status: He is alert. Mental status is at baseline.   Psychiatric:         Mood and Affect: Mood normal.       Vents:  Vent Mode: A/C (04/07/22 0430)  Set Rate: 14 BPM (04/07/22 0430)  Vt Set: 480 mL (04/07/22 0430)  Pressure Support: 12 cmH20 (04/04/22 0340)  PEEP/CPAP: 5 cmH20 (04/07/22 0430)  Oxygen Concentration (%): 35 (04/07/22 0710)  Peak Airway Pressure: 18 cmH2O (04/07/22 0430)  Plateau Pressure: 20 cmH20 (03/07/22 0500)  Total Ve: 12 mL (04/07/22 0430)  F/VT Ratio<105  (RSBI): (!) 71.43 (04/07/22 0430)    Lines/Drains/Airways       Central Venous Catheter Line  Duration                  Hemodialysis Catheter 12/30/21 0900 right internal jugular 98 days              Drain  Duration                  Colostomy 12/18/21 1030 Descending/sigmoid  days         Gastrostomy/Enterostomy 03/09/22 1436 Percutaneous endoscopic gastrostomy (PEG) midline feeding 28 days              Airway  Duration                  Surgical Airway 01/04/22 [place] 93 days              Peripheral Intravenous Line  Duration                  Peripheral IV - Single Lumen 04/04/22 1430 22 G Posterior;Right Hand 2 days         Peripheral IV - Single Lumen 04/04/22 1500 20 G Anterior;Distal;Right Upper Arm 2 days                    Significant Labs:    CBC/Anemia Profile:  No results for input(s): WBC, HGB, HCT, PLT, MCV, RDW, IRON, FERRITIN, RETIC, FOLATE, [id number], OCCULTBLOOD in the last 48 hours.     Chemistries:  No results for input(s): NA, K, CL, CO2, BUN, CREATININE, CALCIUM, ALBUMIN, PROT, BILITOT, ALKPHOS, ALT, AST, GLUCOSE, MG, PHOS in the last 48 hours.    All pertinent labs within the past 24 hours have been reviewed.    Significant Imaging:  I have reviewed all pertinent imaging results/findings within the past 24 hours.

## 2022-04-07 NOTE — PROGRESS NOTES
[facility]  Nephrology  Progress Note    Patient Name: Og Garcia  MRN: [id number]  Admission Date: 12/23/2021  Hospital Length of Stay: 105 days  Attending Provider: Cecil Abernathy DO   Primary Care Physician: Hector Arndt DNP, FNP-C  Principal Problem:Denice gangrene    Subjective:     HPI: The patient is known from the previous nephrology consult at [facility].  He is no at Specialty and continues to need dialysis support.      Interval History: The patient's condition is about the same.  No acute changes.    Review of patient's allergies indicates:  No Known Allergies  Current Facility-Administered Medications   Medication Frequency    0.9%  NaCl infusion (for blood administration) Q24H PRN    0.9%  NaCl infusion PRN    acetaminophen tablet 500 mg Q6H PRN    acetic acid 0.25 % irrigation PRN    albuterol nebulizer solution 2.5 mg Q4H PRN    albuterol-ipratropium 2.5 mg-0.5 mg/3 mL nebulizer solution 3 mL Q6H    bisacodyL EC tablet 10 mg Daily PRN    calcium gluconate 1 g in dextrose 5 % in water (D5W) 5 % 100 mL IVPB (MB+) Once    cefTRIAXone (ROCEPHIN) 1 g in dextrose 5 % in water (D5W) 5 % 50 mL IVPB (MB+) Q24H    clindamycin in D5W 600 mg/50 mL IVPB 600 mg Q8H    dextrose 50% injection 12.5 g PRN    diltiaZEM tablet 90 mg Q6H    glucagon (human recombinant) injection 1 mg PRN    guaiFENesin 100 mg/5 ml syrup 200 mg Q6H    heparin (porcine) injection 4,000 Units PRN    heparin (porcine) injection 5,000 Units Q8H    HYDROcodone-acetaminophen 5-325 mg per tablet 1 tablet Q8H    Followed by    [START ON 4/13/2022] HYDROcodone-acetaminophen 5-325 mg per tablet 1 tablet Q12H    insulin aspart U-100 injection 1-10 Units Q6H    insulin detemir U-100 injection 25 Units QHS    morphine injection 4 mg Q3H PRN    ondansetron injection 8 mg Q6H PRN    pantoprazole suspension 40 mg Daily    predniSONE tablet 5 mg Daily    sevelamer carbonate pwpk 0.8 g TID WM     silver sulfADIAZINE 1% cream Daily PRN    simethicone chewable tablet 80 mg TID PRN       Objective:     Vital Signs (Most Recent):  Temp: 99 °F (37.2 °C) (04/07/22 1100)  Pulse: 97 (04/07/22 1745)  Resp: (!) 30 (04/07/22 1745)  BP: (!) 97/43 (04/07/22 1755)  SpO2: 98 % (04/07/22 1745)  O2 Device (Oxygen Therapy): ventilator (04/07/22 1241)   Vital Signs (24h Range):  Temp:  [98.2 °F (36.8 °C)-101.5 °F (38.6 °C)] 99 °F (37.2 °C)  Pulse:  [] 97  Resp:  [15-41] 30  SpO2:  [98 %-100 %] 98 %  BP: ()/(40-58) 97/43     Weight: 75.9 kg (167 lb 5.3 oz) (04/07/22 0600)  Body mass index is 23.34 kg/m².  Body surface area is 1.95 meters squared.    I/O last 3 completed shifts:  In: 4070 [P.O.:120; NG/GT:3850; IV Piggyback:100]  Out: 3335 [Other:2735; Stool:600]    Physical Exam  Vitals reviewed.   HENT:      Head: Atraumatic.   Cardiovascular:      Rate and Rhythm: Regular rhythm.   Pulmonary:      Effort: Pulmonary effort is normal.      Comments: ventilated  Abdominal:      General: Abdomen is flat.   Neurological:      Mental Status: He is alert.       Significant Labs:  BMP:   Recent Labs   Lab 04/04/22  0931   GLU 95   *   K 4.1   CL 96*   CO2 25   BUN 97*   CREATININE 2.59*   CALCIUM 8.4*     CBC:   Recent Labs   Lab 04/04/22  0223 04/04/22  1703   WBC 19.40*  --    RBC 2.17*  --    HGB 6.4* 7.8*   HCT 20.1* 24.0*     --    MCV 92.6  --    MCH 29.5  --    MCHC 31.8*  --         Significant Imaging:  Labs: Reviewed    Assessment/Plan:     ESRD (end stage renal disease)  4/7/2022  Dialysis as scheduled.      No other changes.  Thank you for your consult. I will follow-up with patient. Please contact us if you have any additional questions.    Edwin Pryor Jr, MD  Nephrology  [facility] [place]

## 2022-04-07 NOTE — ASSESSMENT & PLAN NOTE
Was doing trach collar over the weekend but had to be placed back on the vent earlier this week   - will treat his pain- hopefully this will improve his HR and then start back on weaning trails   4/7- aspirated yesterday - febrile this morning. Will start Clindamycin today -

## 2022-04-07 NOTE — NURSING
2020 Patient in bed lying down. No noted distress.  Respiration even and unlabored. Skin warm to touch. #8LPC trach midline and intact. Patient on AC 14/480/5/35% tolerating with no distress.  INT intact and secure to right thumb. Right midline intact and secure with no complications at this time.  Right chest wall hemodialysis catheter intact and secure.  Peg tub intact and secure with Nepro @ 60 ml/hr with no complications. Colostomy to left quadrant of abdomen with flatus and brown stool noted in bag.  Dressing to bilateral feet and sacral area dry and intact. Right thigh and abdomen open to air. SCD hose intact and secure. Safety measures ongoing .  Will continue to monitor.    0030 Lying in bed resting.  No noted distress or acute changes. All lines and tubes are in placed and no complications at this time. Safety measures ongoing.    0230 Lying in bed resting. Received bath and dressing change to sacral .  All lines and tubes intact with no complications.  No noted distress or acute changes. Safety measures ongoing.    0630 Lying in bed resting. No noted distress. Pt on AC settings on the vent. Safety measures ongoing.    0655 Bedside report given to LIZBET Rose RN.

## 2022-04-07 NOTE — SUBJECTIVE & OBJECTIVE
Interval History: The patient's condition is about the same.  No acute changes.    Review of patient's allergies indicates:  No Known Allergies  Current Facility-Administered Medications   Medication Frequency    0.9%  NaCl infusion (for blood administration) Q24H PRN    0.9%  NaCl infusion PRN    acetaminophen tablet 500 mg Q6H PRN    acetic acid 0.25 % irrigation PRN    albuterol nebulizer solution 2.5 mg Q4H PRN    albuterol-ipratropium 2.5 mg-0.5 mg/3 mL nebulizer solution 3 mL Q6H    bisacodyL EC tablet 10 mg Daily PRN    calcium gluconate 1 g in dextrose 5 % in water (D5W) 5 % 100 mL IVPB (MB+) Once    cefTRIAXone (ROCEPHIN) 1 g in dextrose 5 % in water (D5W) 5 % 50 mL IVPB (MB+) Q24H    clindamycin in D5W 600 mg/50 mL IVPB 600 mg Q8H    dextrose 50% injection 12.5 g PRN    diltiaZEM tablet 90 mg Q6H    glucagon (human recombinant) injection 1 mg PRN    guaiFENesin 100 mg/5 ml syrup 200 mg Q6H    heparin (porcine) injection 4,000 Units PRN    heparin (porcine) injection 5,000 Units Q8H    HYDROcodone-acetaminophen 5-325 mg per tablet 1 tablet Q8H    Followed by    [START ON 4/13/2022] HYDROcodone-acetaminophen 5-325 mg per tablet 1 tablet Q12H    insulin aspart U-100 injection 1-10 Units Q6H    insulin detemir U-100 injection 25 Units QHS    morphine injection 4 mg Q3H PRN    ondansetron injection 8 mg Q6H PRN    pantoprazole suspension 40 mg Daily    predniSONE tablet 5 mg Daily    sevelamer carbonate pwpk 0.8 g TID WM    silver sulfADIAZINE 1% cream Daily PRN    simethicone chewable tablet 80 mg TID PRN       Objective:     Vital Signs (Most Recent):  Temp: 99 °F (37.2 °C) (04/07/22 1100)  Pulse: 97 (04/07/22 1745)  Resp: (!) 30 (04/07/22 1745)  BP: (!) 97/43 (04/07/22 1755)  SpO2: 98 % (04/07/22 1745)  O2 Device (Oxygen Therapy): ventilator (04/07/22 1241)   Vital Signs (24h Range):  Temp:  [98.2 °F (36.8 °C)-101.5 °F (38.6 °C)] 99 °F (37.2 °C)  Pulse:  [] 97  Resp:  [15-41] 30  SpO2:  [98 %-100 %] 98  %  BP: ()/(40-58) 97/43     Weight: 75.9 kg (167 lb 5.3 oz) (04/07/22 0600)  Body mass index is 23.34 kg/m².  Body surface area is 1.95 meters squared.    I/O last 3 completed shifts:  In: 4070 [P.O.:120; NG/GT:3850; IV Piggyback:100]  Out: 3335 [Other:2735; Stool:600]    Physical Exam  Vitals reviewed.   HENT:      Head: Atraumatic.   Cardiovascular:      Rate and Rhythm: Regular rhythm.   Pulmonary:      Effort: Pulmonary effort is normal.      Comments: ventilated  Abdominal:      General: Abdomen is flat.   Neurological:      Mental Status: He is alert.       Significant Labs:  BMP:   Recent Labs   Lab 04/04/22  0931   GLU 95   *   K 4.1   CL 96*   CO2 25   BUN 97*   CREATININE 2.59*   CALCIUM 8.4*     CBC:   Recent Labs   Lab 04/04/22  0223 04/04/22  1703   WBC 19.40*  --    RBC 2.17*  --    HGB 6.4* 7.8*   HCT 20.1* 24.0*     --    MCV 92.6  --    MCH 29.5  --    MCHC 31.8*  --         Significant Imaging:  Labs: Reviewed

## 2022-04-07 NOTE — ASSESSMENT & PLAN NOTE
Treated anemia   - concern this may be releated to pain and pain medication withdraw - his fentanyl patch was discontinued over the weekend which he has been on for weeks.  -plan - will start PO pain medication q8hrs then decrease to q12 hours after 7 days.   4/7 improving

## 2022-04-07 NOTE — PROGRESS NOTES
[facility]  Pulmonology  Progress Note    Patient Name: Og Garcia  MRN: [id number]  Admission Date: 12/23/2021  Hospital Length of Stay: 105 days  Code Status: Prior  Attending Provider: Cecil Abernathy DO  Primary Care Provider: Hector Arndt DNP, FNP-C   Principal Problem: Denice gangrene    Subjective:     Interval History: febrile this morning. Vomited ans aspirated yesterday.     Objective:     Vital Signs (Most Recent):  Temp: 99 °F (37.2 °C) (04/07/22 1100)  Pulse: 100 (04/07/22 1115)  Resp: (!) 30 (04/07/22 1115)  BP: (!) 90/51 (04/07/22 1115)  SpO2: 100 % (04/07/22 1115)   Vital Signs (24h Range):  Temp:  [98.2 °F (36.8 °C)-101.5 °F (38.6 °C)] 99 °F (37.2 °C)  Pulse:  [] 100  Resp:  [15-45] 30  SpO2:  [100 %] 100 %  BP: ()/(41-58) 90/51     Weight: 75.9 kg (167 lb 5.3 oz)  Body mass index is 23.34 kg/m².      Intake/Output Summary (Last 24 hours) at 4/7/2022 1226  Last data filed at 4/7/2022 1110  Gross per 24 hour   Intake 3030 ml   Output 500 ml   Net 2530 ml       Physical Exam  Vitals reviewed.   Constitutional:       General: He is not in acute distress.     Comments: Chronically ill appearing   HENT:      Right Ear: External ear normal.      Left Ear: External ear normal.      Mouth/Throat:      Mouth: Mucous membranes are moist.   Eyes:      Pupils: Pupils are equal, round, and reactive to light.   Cardiovascular:      Rate and Rhythm: Regular rhythm.   Pulmonary:      Breath sounds: Rhonchi present. No wheezing or rales.   Abdominal:      Palpations: Abdomen is soft.   Musculoskeletal:         General: Normal range of motion.      Cervical back: Normal range of motion and neck supple.   Skin:     General: Skin is warm and dry.      Capillary Refill: Capillary refill takes less than 2 seconds.   Neurological:      Mental Status: He is alert. Mental status is at baseline.   Psychiatric:         Mood and Affect: Mood normal.       Vents:  Vent Mode:  A/C (04/07/22 0430)  Set Rate: 14 BPM (04/07/22 0430)  Vt Set: 480 mL (04/07/22 0430)  Pressure Support: 12 cmH20 (04/04/22 0340)  PEEP/CPAP: 5 cmH20 (04/07/22 0430)  Oxygen Concentration (%): 35 (04/07/22 0710)  Peak Airway Pressure: 18 cmH2O (04/07/22 0430)  Plateau Pressure: 20 cmH20 (03/07/22 0500)  Total Ve: 12 mL (04/07/22 0430)  F/VT Ratio<105 (RSBI): (!) 71.43 (04/07/22 0430)    Lines/Drains/Airways       Central Venous Catheter Line  Duration                  Hemodialysis Catheter 12/30/21 0900 right internal jugular 98 days              Drain  Duration                  Colostomy 12/18/21 1030 Descending/sigmoid  days         Gastrostomy/Enterostomy 03/09/22 1436 Percutaneous endoscopic gastrostomy (PEG) midline feeding 28 days              Airway  Duration                  Surgical Airway 01/04/22 [place] 93 days              Peripheral Intravenous Line  Duration                  Peripheral IV - Single Lumen 04/04/22 1430 22 G Posterior;Right Hand 2 days         Peripheral IV - Single Lumen 04/04/22 1500 20 G Anterior;Distal;Right Upper Arm 2 days                    Significant Labs:    CBC/Anemia Profile:  No results for input(s): WBC, HGB, HCT, PLT, MCV, RDW, IRON, FERRITIN, RETIC, FOLATE, [id number], OCCULTBLOOD in the last 48 hours.     Chemistries:  No results for input(s): NA, K, CL, CO2, BUN, CREATININE, CALCIUM, ALBUMIN, PROT, BILITOT, ALKPHOS, ALT, AST, GLUCOSE, MG, PHOS in the last 48 hours.    All pertinent labs within the past 24 hours have been reviewed.    Significant Imaging:  I have reviewed all pertinent imaging results/findings within the past 24 hours.    Assessment/Plan:     Chronic respiratory failure  Was doing trach collar over the weekend but had to be placed back on the vent earlier this week   - will treat his pain- hopefully this will improve his HR and then start back on weaning trails   4/7- aspirated yesterday - febrile this morning. Will start Clindamycin today -      Tachycardia  Treated anemia   - concern this may be releated to pain and pain medication withdraw - his fentanyl patch was discontinued over the weekend which he has been on for weeks.  -plan - will start PO pain medication q8hrs then decrease to q12 hours after 7 days.   4/7 improving     Fever  2/28 - resp culture with pseudomonas and klebsiella  treat with zosyn for 7 days   Blood culture from 2/25 also growing Pseudomonas. Sensitive to zosyn. Will need to continue zosyn for at least 10 days total  - low grade fever since 3/28, CXR- unchanged and repeat CBC- WBC 20.57  - removed malpositioned LUE PICC.  - blood cultures- NGTD after 72  Hours   -4/6-  Temp 101 at noon yesterday- wound cultures with heavy growth gram neg bacilli - will go ahead and add Rocephin and continue to follow cultures    4/7- febrile this morning - added clinda given his aspiration event     ESRD (end stage renal disease)  Started on HD 12/30   Continue with HD per nephrology      Type 2 diabetes mellitus without complication, without long-term current use of insulin  - accuchecks look ok  - Continue with current care                   Elinor Baker AG-ACNP  Pulmonology  [facility]

## 2022-04-07 NOTE — PROGRESS NOTES
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 male with wounds to sacral, left lateral lower leg, right anterior foot, right hand, Left posterior heel, and right lateral and medial foot. Sacral wound is necrotic with moderate amount of green drainage and odor. Wounds on feet and heels continue to have necrotic tissue.     Review of Systems   Unable to perform ROS: Acuity of condition     Objective:     Vital Signs (Most Recent):  Temp: 99 °F (37.2 °C) (04/07/22 1100)  Pulse: 100 (04/07/22 1115)  Resp: (!) 30 (04/07/22 1115)  BP: (!) 90/51 (04/07/22 1115)  SpO2: 100 % (04/07/22 1115) Vital Signs (24h Range):  Temp:  [98.2 °F (36.8 °C)-101.5 °F (38.6 °C)] 99 °F (37.2 °C)  Pulse:  [] 100  Resp:  [15-45] 30  SpO2:  [100 %] 100 %  BP: ()/(41-58) 90/51     Weight: 75.9 kg (167 lb 5.3 oz)  Body mass index is 23.34 kg/m².    Physical Exam  Vitals reviewed.   Constitutional:       Appearance: He is ill-appearing.   HENT:      Head: Normocephalic.   Cardiovascular:      Rate and Rhythm: Regular rhythm. Tachycardia present.      Pulses: Normal pulses.      Heart sounds: Normal heart sounds.   Pulmonary:      Comments: Intubated  Skin:     Findings: Erythema present.      Comments: Wound, see wound assessment and pictures   Neurological:      Mental Status: He is alert. Mental status is at baseline.      Motor: Weakness present.            Altered Skin Integrity 01/20/22 1000 Right anterior Foot Purple or maroon localized area of discolored intact skin or non-intact skin or a blood-filled blister. (Active)    01/20/22 1000   Altered Skin Integrity Present on Admission: suspected hospital acquired   Side: Right   Orientation: anterior   Location: Foot   Wound Number:    Is this injury device related?:    Primary Wound Type:    Description of Altered Skin Integrity: Purple or maroon localized area of discolored intact skin or non-intact skin or a blood-filled blister.   Removal Indication and Assessment:    Wound Outcome:    (Retired) Wound Length (cm):    (Retired) Wound Width (cm):    (Retired) Depth (cm):    Wound Description (Comments):    Removal Indications:    Wound Image    03/28/22 1145   Description of Altered Skin Integrity Purple or maroon localized area of discolored intact skin or non-intact skin or a blood-filled blister. 03/29/22 1100   Dressing Appearance Intact 04/06/22 2020   Drainage Amount None 04/05/22 1700   Appearance Dressing in place, unable to visualize;Intact 04/05/22 1930   Tissue loss description Not applicable 04/04/22 1300   Periwound Area Dry 04/04/22 1300   Wound Edges Defined 04/04/22 1300   Wound Length (cm) 1 cm 03/28/22 1145   Wound Width (cm) 0.5 cm 03/28/22 1145   Wound Depth (cm) 0 cm 03/28/22 1145   Wound Volume (cm^3) 0 cm^3 03/28/22 1145   Wound Surface Area (cm^2) 0.5 cm^2 03/28/22 1145   Care Cleansed with:;Soap and water;Applied:;Moisturizing agent;Povidone iodine 04/04/22 1300   Dressing Removed;Applied;Changed 03/26/22 1500   Dressing Change Due 03/24/22 03/22/22 1300            Altered Skin Integrity 01/20/22 1000 Right lateral Foot Purple or maroon localized area of discolored intact skin or non-intact skin or a blood-filled blister. (Active)   01/20/22 1000   Altered Skin Integrity Present on Admission: suspected hospital acquired   Side: Right   Orientation: lateral   Location: Foot   Wound Number:    Is this injury device related?:    Primary Wound Type:    Description of Altered Skin Integrity: Purple or maroon localized area of discolored intact skin or non-intact  skin or a blood-filled blister.   Removal Indication and Assessment:    Wound Outcome:    (Retired) Wound Length (cm):    (Retired) Wound Width (cm):    (Retired) Depth (cm):    Wound Description (Comments):    Removal Indications:    Wound Image    03/28/22 1145   Description of Altered Skin Integrity Full thickness tissue loss. Base is covered by slough and/or eschar in the wound bed 03/28/22 1145   Dressing Appearance Intact 04/06/22 2020   Drainage Amount None 04/04/22 1300   Appearance Dressing in place, unable to visualize;Intact 04/05/22 1930   Tissue loss description Not applicable 04/04/22 1300   Black (%), Wound Tissue Color 100 % 03/28/22 1145   Red (%), Wound Tissue Color 0 % 03/28/22 1145   Yellow (%), Wound Tissue Color 0 % 03/28/22 1145   Periwound Area Dry 04/04/22 1300   Wound Edges Defined 04/04/22 1300   Wound Length (cm) 3 cm 03/22/22 1300   Wound Width (cm) 4 cm 03/22/22 1300   Wound Depth (cm) 0 cm 03/22/22 1300   Wound Volume (cm^3) 0 cm^3 03/22/22 1300   Wound Surface Area (cm^2) 12 cm^2 03/22/22 1300   Care Cleansed with:;Soap and water;Removed:;Moisturizing agent;Povidone iodine 04/04/22 1300   Dressing Removed;Applied;Changed 04/04/22 1300   Periwound Care Moisture barrier applied 04/04/22 1300   Dressing Change Due 04/05/22 04/04/22 1300            Altered Skin Integrity 02/08/22 1300 Left posterior Heel Other (comment) Full thickness tissue loss. Base is covered by slough and/or eschar in the wound bed (Active)   02/08/22 1300   Altered Skin Integrity Present on Admission: yes   Side: Left   Orientation: posterior   Location: Heel   Wound Number:    Is this injury device related?: No   Primary Wound Type: Other   Description of Altered Skin Integrity: Full thickness tissue loss. Base is covered by slough and/or eschar in the wound bed   Removal Indication and Assessment:    Wound Outcome:    (Retired) Wound Length (cm):    (Retired) Wound Width (cm):    (Retired) Depth (cm):    Wound  Description (Comments):    Removal Indications:    Wound Image    03/28/22 1145   Description of Altered Skin Integrity Full thickness tissue loss. Base is covered by slough and/or eschar in the wound bed 03/28/22 1145   Dressing Appearance Intact 04/06/22 2020   Drainage Amount None 04/04/22 1300   Drainage Characteristics/Odor No odor 04/04/22 1300   Appearance Dressing in place, unable to visualize 04/04/22 1925   Tissue loss description Not applicable 04/04/22 1300   Black (%), Wound Tissue Color 99 % 03/28/22 1145   Red (%), Wound Tissue Color 1 % 03/28/22 1145   Yellow (%), Wound Tissue Color 0 % 03/28/22 1145   Periwound Area Dry 04/04/22 1300   Wound Edges Defined 04/04/22 1300   Wound Length (cm) 4 cm 03/22/22 1300   Wound Width (cm) 6 cm 03/22/22 1300   Wound Depth (cm) 0 cm 03/22/22 1300   Wound Volume (cm^3) 0 cm^3 03/22/22 1300   Wound Surface Area (cm^2) 24 cm^2 03/22/22 1300   Care Cleansed with:;Soap and water;Wound cleanser;Applied:;Removed:;Skin Barrier;Moisturizing agent;Povidone iodine 04/04/22 1300   Dressing Removed;Applied;Changed 04/04/22 1300   Periwound Care Moisture barrier applied 04/04/22 1300   Dressing Change Due 04/05/22 04/04/22 1300            Altered Skin Integrity 02/15/22 1300 Left lateral Leg Traumatic Partial thickness tissue loss. Shallow open ulcer with a red or pink wound bed, without slough. Intact or Open/Ruptured Serum-filled blister. (Active)   02/15/22 1300   Altered Skin Integrity Present on Admission: suspected hospital acquired   Side: Left   Orientation: lateral   Location: Leg   Wound Number:    Is this injury device related?: No   Primary Wound Type: Traumatic   Description of Altered Skin Integrity: Partial thickness tissue loss. Shallow open ulcer with a red or pink wound bed, without slough. Intact or Open/Ruptured Serum-filled blister.   Removal Indication and Assessment:    Wound Outcome:    (Retired) Wound Length (cm):    (Retired) Wound Width (cm):     (Retired) Depth (cm):    Wound Description (Comments):    Removal Indications:    Wound Image    03/28/22 1145   Description of Altered Skin Integrity Purple or maroon localized area of discolored intact skin or non-intact skin or a blood-filled blister. 03/28/22 1145   Dressing Appearance Open to air 03/1955   Drainage Amount None 03/1955   Appearance Dry 03/29/22 1100   Tissue loss description Partial thickness 02/15/22 1300   Black (%), Wound Tissue Color 0 % 03/28/22 1145   Red (%), Wound Tissue Color 80 % 03/28/22 1145   Yellow (%), Wound Tissue Color 0 % 03/28/22 1145   Periwound Area Dry 03/29/22 1100   Wound Edges Defined 03/29/22 1100   Care Cleansed with:;Soap and water 04/02/22 1150   Dressing Removed;Applied;Changed 04/02/22 1150            Altered Skin Integrity 02/15/22 1300 Right anterior Foot Traumatic Partial thickness tissue loss. Shallow open ulcer with a red or pink wound bed, without slough. Intact or Open/Ruptured Serum-filled blister. (Active)   02/15/22 1300   Altered Skin Integrity Present on Admission: suspected hospital acquired   Side: Right   Orientation: anterior   Location: Foot   Wound Number:    Is this injury device related?: No   Primary Wound Type: Traumatic   Description of Altered Skin Integrity: Partial thickness tissue loss. Shallow open ulcer with a red or pink wound bed, without slough. Intact or Open/Ruptured Serum-filled blister.   Removal Indication and Assessment:    Wound Outcome:    (Retired) Wound Length (cm):    (Retired) Wound Width (cm):    (Retired) Depth (cm):    Wound Description (Comments):    Removal Indications:    Wound Image    03/28/22 1145   Description of Altered Skin Integrity Purple or maroon localized area of discolored intact skin or non-intact skin or a blood-filled blister. 03/15/22 1000   Dressing Appearance Intact 04/06/22 2020   Drainage Amount None 04/04/22 1300   Drainage Characteristics/Odor Serosanguineous 02/26/22 1600    Appearance Dressing in place, unable to visualize;Intact 04/05/22 1930   Tissue loss description Not applicable 04/04/22 1300   Black (%), Wound Tissue Color 99 % 03/15/22 1000   Red (%), Wound Tissue Color 99 % 02/15/22 1300   Periwound Area Dry 04/04/22 1300   Wound Edges Defined 04/04/22 1300   Care Cleansed with:;Soap and water 04/04/22 1300   Dressing Removed;Changed;Applied 04/02/22 1150   Dressing Change Due 02/28/22 02/24/22 1330            Altered Skin Integrity 02/28/22 1300 Left anterior Foot Traumatic Partial thickness tissue loss. Shallow open ulcer with a red or pink wound bed, without slough. Intact or Open/Ruptured Serum-filled blister. (Active)   02/28/22 1300   Altered Skin Integrity Present on Admission: suspected hospital acquired   Side: Left   Orientation: anterior   Location: Foot   Wound Number:    Is this injury device related?: No   Primary Wound Type: Traumatic   Description of Altered Skin Integrity: Partial thickness tissue loss. Shallow open ulcer with a red or pink wound bed, without slough. Intact or Open/Ruptured Serum-filled blister.   Removal Indication and Assessment:    Wound Outcome:    (Retired) Wound Length (cm):    (Retired) Wound Width (cm):    (Retired) Depth (cm):    Wound Description (Comments):    Removal Indications:    Wound Image    03/28/22 1145   Description of Altered Skin Integrity Purple or maroon localized area of discolored intact skin or non-intact skin or a blood-filled blister. 03/28/22 1145   Dressing Appearance Intact 04/06/22 2020   Drainage Amount None 04/01/22 1300   Drainage Characteristics/Odor No odor 04/04/22 1300   Appearance Dressing in place, unable to visualize;Intact 04/05/22 1930   Tissue loss description Not applicable 04/04/22 1300   Black (%), Wound Tissue Color 100 % 03/28/22 1145   Red (%), Wound Tissue Color 0 % 03/28/22 1145   Yellow (%), Wound Tissue Color 0 % 03/28/22 1145   Periwound Area Dry 04/04/22 1300   Wound Edges Defined  04/04/22 1300   Care Cleansed with:;Soap and water;Applied:;Povidone iodine 04/04/22 1300   Dressing Applied;Changed;Removed 04/04/22 1300   Dressing Change Due 04/05/22 04/04/22 1300            Altered Skin Integrity 03/07/22 1300 Left anterior Greater trochanter Moisture associated dermatitis (Active)   03/07/22 1300   Altered Skin Integrity Present on Admission:    Side: Left   Orientation: anterior   Location: Greater trochanter   Wound Number:    Is this injury device related?: No   Primary Wound Type: Moisture ass   Description of Altered Skin Integrity:    Removal Indication and Assessment:    Wound Outcome:    (Retired) Wound Length (cm):    (Retired) Wound Width (cm):    (Retired) Depth (cm):    Wound Description (Comments):    Removal Indications:    Wound Image    03/17/22 1300   Dressing Appearance Open to air;Dry;Intact;Clean 04/04/22 1300   Drainage Amount None 04/04/22 1300   Appearance Intact;Dry 04/01/22 1300   Tissue loss description Not applicable 03/29/22 1100   Periwound Area Dry;Intact 04/04/22 1300   Wound Edges Defined 04/01/22 1300   Care Cleansed with:;Soap and water 04/01/22 1300            Altered Skin Integrity 03/07/22 1300 Right anterior Toe, fourth Ulceration Partial thickness tissue loss. Shallow open ulcer with a red or pink wound bed, without slough. Intact or Open/Ruptured Serum-filled blister. (Active)   03/07/22 1300   Altered Skin Integrity Present on Admission: yes   Side: Right   Orientation: anterior   Location: Toe, fourth   Wound Number:    Is this injury device related?: No   Primary Wound Type: Ulceration   Description of Altered Skin Integrity: Partial thickness tissue loss. Shallow open ulcer with a red or pink wound bed, without slough. Intact or Open/Ruptured Serum-filled blister.   Removal Indication and Assessment:    Wound Outcome:    (Retired) Wound Length (cm):    (Retired) Wound Width (cm):    (Retired) Depth (cm):    Wound Description (Comments):    Removal  Indications:    Wound Image    03/28/22 1145   Description of Altered Skin Integrity Full thickness tissue loss. Base is covered by slough and/or eschar in the wound bed 03/28/22 1145   Dressing Appearance Intact 04/06/22 2020   Drainage Amount None 04/04/22 1300   Drainage Characteristics/Odor No odor 04/04/22 1300   Appearance Dressing in place, unable to visualize;Intact 04/05/22 1930   Tissue loss description Not applicable 04/04/22 1300   Periwound Area Dry 04/04/22 1300   Wound Edges Defined 04/04/22 1300   Care Applied:;Povidone iodine 04/04/22 1300   Dressing Removed;Applied;Changed 04/02/22 1150   Dressing Change Due 04/02/22 04/02/22 1150            Altered Skin Integrity 03/15/22 1000 Right lateral Malleolus Ulceration Purple or maroon localized area of discolored intact skin or non-intact skin or a blood-filled blister. (Active)   03/15/22 1000   Altered Skin Integrity Present on Admission: suspected hospital acquired   Side: Right   Orientation: lateral   Location: Malleolus   Wound Number:    Is this injury device related?: Yes   Primary Wound Type: Ulceration   Description of Altered Skin Integrity: Purple or maroon localized area of discolored intact skin or non-intact skin or a blood-filled blister.   Removal Indication and Assessment:    Wound Outcome:    (Retired) Wound Length (cm):    (Retired) Wound Width (cm):    (Retired) Depth (cm):    Wound Description (Comments):    Removal Indications:    Wound Image    03/28/22 1145   Description of Altered Skin Integrity Full thickness tissue loss. Base is covered by slough and/or eschar in the wound bed 03/28/22 1145   Dressing Appearance Intact 04/06/22 2020   Drainage Amount Scant 04/04/22 1300   Drainage Characteristics/Odor Green;No odor 04/04/22 1300   Appearance Dressing in place, unable to visualize;Intact 04/05/22 1930   Tissue loss description Not applicable 04/04/22 1300   Black (%), Wound Tissue Color 99 % 03/28/22 1145   Red (%), Wound  Tissue Color 1 % 03/28/22 1145   Yellow (%), Wound Tissue Color 0 % 03/28/22 1145   Periwound Area Dry 04/04/22 1300   Wound Edges Defined;Jagged 04/04/22 1300   Wound Length (cm) 3 cm 03/22/22 1300   Wound Width (cm) 3.5 cm 03/22/22 1300   Wound Depth (cm) 0 cm 03/22/22 1300   Wound Volume (cm^3) 0 cm^3 03/22/22 1300   Wound Surface Area (cm^2) 10.5 cm^2 03/22/22 1300   Care Cleansed with:;Soap and water;Wound cleanser;Applied:;Removed:;Skin Barrier;Moisturizing agent;Povidone iodine 04/04/22 1300   Dressing Removed;Applied;Changed;Gauze;Rolled gauze;Silicone 04/04/22 1300   Dressing Change Due 04/05/22 04/04/22 1300            Altered Skin Integrity 03/15/22 1000 Right lateral Leg Ulceration Purple or maroon localized area of discolored intact skin or non-intact skin or a blood-filled blister. (Active)   03/15/22 1000   Altered Skin Integrity Present on Admission: suspected hospital acquired   Side: Right   Orientation: lateral   Location: Leg   Wound Number:    Is this injury device related?: No   Primary Wound Type: Ulceration   Description of Altered Skin Integrity: Purple or maroon localized area of discolored intact skin or non-intact skin or a blood-filled blister.   Removal Indication and Assessment:    Wound Outcome:    (Retired) Wound Length (cm):    (Retired) Wound Width (cm):    (Retired) Depth (cm):    Wound Description (Comments):    Removal Indications:    Wound Image    03/28/22 1145   Description of Altered Skin Integrity Full thickness tissue loss. Base is covered by slough and/or eschar in the wound bed 03/28/22 1145   Dressing Appearance Dry 04/04/22 1300   Drainage Amount None 04/04/22 1300   Appearance Dressing in place, unable to visualize;Intact 04/05/22 1930   Tissue loss description Not applicable 04/04/22 1300   Black (%), Wound Tissue Color 99 % 03/28/22 1145   Red (%), Wound Tissue Color 1 % 03/28/22 1145   Yellow (%), Wound Tissue Color 0 % 03/28/22 1145   Periwound Area Dry 04/04/22  1300   Wound Edges Defined 04/04/22 1300   Wound Length (cm) 4 cm 03/22/22 1300   Wound Width (cm) 1 cm 03/22/22 1300   Wound Depth (cm) 0 cm 03/22/22 1300   Wound Volume (cm^3) 0 cm^3 03/22/22 1300   Wound Surface Area (cm^2) 4 cm^2 03/22/22 1300   Care Cleansed with:;Soap and water;Wound cleanser;Applied:;Removed:;Skin Barrier;Moisturizing agent;Povidone iodine 04/04/22 1300   Dressing Removed;Applied;Changed;Gauze;Rolled gauze 04/04/22 1300   Dressing Change Due 04/05/22 04/04/22 1300            Altered Skin Integrity 03/22/22 1300 posterior Sacral spine Other (comment) Full thickness tissue loss. Base is covered by slough and/or eschar in the wound bed (Active)   03/22/22 1300   Altered Skin Integrity Present on Admission: yes   Side:    Orientation: posterior   Location: Sacral spine   Wound Number:    Is this injury device related?: No   Primary Wound Type: Other   Description of Altered Skin Integrity: Full thickness tissue loss. Base is covered by slough and/or eschar in the wound bed   Removal Indication and Assessment:    Wound Outcome:    (Retired) Wound Length (cm):    (Retired) Wound Width (cm):    (Retired) Depth (cm):    Wound Description (Comments):    Removal Indications:    Wound Image    03/28/22 1145   Description of Altered Skin Integrity Full thickness tissue loss with exposed bone, tendon, or muscle. Often includes undermining and tunneling. May extend into muscle and/or supporting structures. 03/28/22 1145   Dressing Appearance Moist drainage 04/04/22 1300   Drainage Amount Moderate 04/04/22 1300   Drainage Characteristics/Odor Serosanguineous;Malodorous 04/04/22 1300   Appearance Dressing in place, unable to visualize;Intact 04/05/22 1930   Tissue loss description Full thickness 04/04/22 1300   Black (%), Wound Tissue Color 0 % 03/28/22 1145   Red (%), Wound Tissue Color 50 % 03/28/22 1145   Yellow (%), Wound Tissue Color 40 % 03/28/22 1145   Periwound Area Moist 04/04/22 1300   Wound Edges  Defined;Open 04/04/22 1300   Wound Length (cm) 13 cm 03/22/22 1300   Wound Width (cm) 11 cm 03/22/22 1300   Wound Depth (cm) 3 cm 03/22/22 1300   Wound Volume (cm^3) 429 cm^3 03/22/22 1300   Wound Surface Area (cm^2) 143 cm^2 03/22/22 1300   Care Cleansed with:;Soap and water 04/06/22 1317   Dressing Applied;Other (comment) 04/06/22 1317   Dressing Change Due 04/05/22 04/04/22 1300            Incision/Site 01/04/22 1444 Neck (Active)   01/04/22 1444   Present Prior to Hospital Arrival?:    Side:    Location: Neck   Orientation:    Incision Type:    Closure Method:    Additional Comments:    Removal Indication and Assessment:    Wound Outcome:    Removal Indications:    Incision WDL WDL 03/1955   Dressing Appearance Open to air 03/1955   Drainage Amount None 03/1955            Incision/Site 02/21/22 1318 Right Leg (Active)   02/21/22 1318   Present Prior to Hospital Arrival?:    Side: Right   Location: Leg   Orientation:    Incision Type:    Closure Method:    Additional Comments:    Removal Indication and Assessment:    Wound Outcome:    Removal Indications:    Wound Image    03/28/22 1145   Incision WDL WDL 04/04/22 1300   Dressing Appearance Intact;Dry 04/06/22 2020   Drainage Amount None 04/04/22 1300   Drainage Characteristics/Odor Serosanguineous;No odor 03/22/22 1300   Appearance Dressing in place, unable to visualize;Intact 04/05/22 1930   Red (%), Wound Tissue Color 100 % 03/28/22 1145   Periwound Area Dry 04/04/22 1300   Wound Edges Defined 04/04/22 1300   Care Cleansed with:;Soap and water 04/04/22 1300   Dressing Gauze;Other (comment) 03/08/22 1300   Dressing Change Due 02/28/22 02/24/22 1540            Incision/Site 03/09/22 1444 Abdomen (Active)   03/09/22 1444   Present Prior to Hospital Arrival?:    Side:    Location: Abdomen   Orientation:    Incision Type:    Closure Method:    Additional Comments:    Removal Indication and Assessment:    Wound Outcome:    Removal Indications:     Wound Image    03/28/22 1145   Incision WDL WDL 04/04/22 1300   Dressing Appearance Open to air 04/06/22 2020   Drainage Amount None 04/04/22 1300   Drainage Characteristics/Odor Serosanguineous;No odor 03/22/22 1300   Appearance Intact;Dry 04/04/22 1300   Black (%), Wound Tissue Color 0 % 03/28/22 1145   Red (%), Wound Tissue Color 99 % 03/28/22 1145   Yellow (%), Wound Tissue Color 0 % 03/28/22 1145   Periwound Area Dry 04/04/22 1300   Wound Edges Defined 04/01/22 1300   Care Cleansed with:;Soap and water 04/04/22 1300   Dressing Removed;Applied;Changed;Non-adherent;Other (comment) 03/22/22 1300            Incision/Site 03/25/22 1008 Buttocks (Active)   03/25/22 1008   Present Prior to Hospital Arrival?:    Side:    Location: Buttocks   Orientation:    Incision Type:    Closure Method:    Additional Comments:    Removal Indication and Assessment:    Wound Outcome:    Removal Indications:    Incision WDL WDL 03/1955   Dressing Appearance Intact;Dry 04/06/22 2020   Drainage Amount None 03/1955   Appearance Dressing in place, unable to visualize;Intact 04/05/22 2330   Care Wound cleanser 04/07/22 0230   Dressing Changed;Gauze, wet to dry 04/07/22 0230   Packing packing removed;packed with 04/07/22 0230       [REMOVED]      Negative Pressure Wound Therapy  12/14/21 midline;lower (Removed)   12/14/21    Side:    Orientation: midline;lower   Location: Lower quadrant   Additional Comments:    Removal Indication and Assessment: removed by previous caregiver   Location:    SDO Location:    Removed 02/16/22 0800   NPWT Type Vacuum Therapy 02/15/22 0600   Therapy Setting NPWT Vacuum off 02/15/22 1300   Pressure Setting NPWT 125 mmHg 02/15/22 0600   Therapy Interventions NPWT Seal intact 02/15/22 0600   Sponges Inserted NPWT Black 02/15/22 0600   Sponges Removed NPWT Black;White;1 02/10/22 1300   General Output (mL) 300 02/10/22 0930       [REMOVED]      Negative Pressure Wound Therapy  02/21/22 Right lower  (Removed)   02/21/22    Side: Right   Orientation: lower   Location: Lower quadrant   Additional Comments:    Removal Indication and Assessment: No Longer Indicated   Location:    SDO Location:    Removed 02/22/22    NPWT Type Vacuum Therapy 02/22/22 0400   Therapy Setting NPWT Continuous therapy 02/22/22 0400   Pressure Setting NPWT other (see comments) 02/22/22 0400   Therapy Interventions NPWT Seal intact 02/22/22 0400   General Output (mL) 0 02/21/22 2340       [REMOVED]      Altered Skin Integrity Left lateral Thigh Purple or maroon localized area of discolored intact skin or non-intact skin or a blood-filled blister. (Removed)       Altered Skin Integrity Present on Admission:    Side: Left   Orientation: lateral   Location: Thigh   Wound Number:    Is this injury device related?:    Primary Wound Type:    Description of Altered Skin Integrity: Purple or maroon localized area of discolored intact skin or non-intact skin or a blood-filled blister.   Removal Indication and Assessment:    Wound Outcome: Healed   (Retired) Wound Length (cm):    (Retired) Wound Width (cm):    (Retired) Depth (cm):    Wound Description (Comments):    Removal Indications:    Removed 03/15/22 1000   Wound Image    03/15/22 1000   Description of Altered Skin Integrity Purple or maroon localized area of discolored intact skin or non-intact skin or a blood-filled blister. 02/13/22 0730   Dressing Appearance Open to air;Dry;Intact 03/15/22 1000   Drainage Amount None 03/15/22 1000   Appearance Dry 03/15/22 1000   Care Cleansed with:;Soap and water 12/28/21 1521       [REMOVED]      Altered Skin Integrity 01/27/22 1045 Right dorsal Hand Other (comment) Full thickness tissue loss. Base is covered by slough and/or eschar in the wound bed (Removed)   01/27/22 1045   Altered Skin Integrity Present on Admission: suspected hospital acquired   Side: Right   Orientation: dorsal   Location: Hand   Wound Number:    Is this injury device related?: No    Primary Wound Type: Other   Description of Altered Skin Integrity: Full thickness tissue loss. Base is covered by slough and/or eschar in the wound bed   Removal Indication and Assessment:    Wound Outcome: Healed   (Retired) Wound Length (cm):    (Retired) Wound Width (cm):    (Retired) Depth (cm):    Wound Description (Comments):    Removal Indications:    Removed 03/21/22 1300   Wound Image    03/17/22 1300   Description of Altered Skin Integrity Partial thickness tissue loss. Shallow open ulcer with a red or pink wound bed, without slough. Intact or Open/Ruptured Serum-filled blister. 02/28/22 1300   Dressing Appearance Open to air;Dry;Intact 03/21/22 1300   Drainage Amount None 03/21/22 1300   Drainage Characteristics/Odor Serosanguineous;No odor 03/01/22 1330   Appearance Pink;Intact 03/21/22 1300   Tissue loss description Full thickness 02/28/22 1300   Black (%), Wound Tissue Color 99 % 02/07/22 1300   Red (%), Wound Tissue Color 95 % 02/24/22 1330   Yellow (%), Wound Tissue Color 5 % 02/24/22 1330   Periwound Area Dry;Intact 03/21/22 1300   Wound Edges Defined 03/21/22 1300   Wound Length (cm) 3.5 cm 02/07/22 1300   Wound Width (cm) 2 cm 02/07/22 1300   Wound Depth (cm) 0 cm 02/07/22 1300   Wound Volume (cm^3) 0 cm^3 02/07/22 1300   Wound Surface Area (cm^2) 7 cm^2 02/07/22 1300   Care Cleansed with:;Soap and water 03/21/22 1300   Dressing Applied;Other (comment) 03/02/22 1300   Dressing Change Due 03/02/22 03/01/22 1330       [REMOVED]      Altered Skin Integrity 02/08/22 1300 Right lateral Malleolus Traumatic Partial thickness tissue loss. Shallow open ulcer with a red or pink wound bed, without slough. Intact or Open/Ruptured Serum-filled blister. (Removed)   02/08/22 1300   Altered Skin Integrity Present on Admission: suspected hospital acquired   Side: Right   Orientation: lateral   Location: Malleolus   Wound Number:    Is this injury device related?: No   Primary Wound Type: Traumatic   Description  of Altered Skin Integrity: Partial thickness tissue loss. Shallow open ulcer with a red or pink wound bed, without slough. Intact or Open/Ruptured Serum-filled blister.   Removal Indication and Assessment: site change   Wound Outcome:    (Retired) Wound Length (cm):    (Retired) Wound Width (cm):    (Retired) Depth (cm):    Wound Description (Comments):    Removal Indications:    Removed 03/15/22 1000   Wound Image    03/07/22 1300   Description of Altered Skin Integrity Partial thickness tissue loss. Shallow open ulcer with a red or pink wound bed, without slough. Intact or Open/Ruptured Serum-filled blister. 03/07/22 1300   Dressing Appearance Dry 03/14/22 0945   Drainage Amount None 03/12/22 1621   Drainage Characteristics/Odor Brown;No odor 03/09/22 1250   Appearance Dressing in place, unable to visualize;Intact 03/13/22 2000   Tissue loss description Not applicable 03/09/22 1250   Periwound Area Dry 03/14/22 0945   Wound Edges Defined 03/14/22 0945   Care Cleansed with:;Soap and water 03/16/22 1100   Dressing Applied;Silver 03/16/22 1100   Dressing Change Due 03/11/22 03/10/22 1100       [REMOVED]      Altered Skin Integrity 02/08/22 1300 Right posterior Achilles Traumatic Purple or maroon localized area of discolored intact skin or non-intact skin or a blood-filled blister. (Removed)   02/08/22 1300   Altered Skin Integrity Present on Admission: suspected hospital acquired   Side: Right   Orientation: posterior   Location: Achilles   Wound Number:    Is this injury device related?: No   Primary Wound Type: Traumatic   Description of Altered Skin Integrity: Purple or maroon localized area of discolored intact skin or non-intact skin or a blood-filled blister.   Removal Indication and Assessment:    Wound Outcome: Healed   (Retired) Wound Length (cm):    (Retired) Wound Width (cm):    (Retired) Depth (cm):    Wound Description (Comments):    Removal Indications:    Removed 03/15/22 1000   Wound Image    03/15/22  1000   Description of Altered Skin Integrity Purple or maroon localized area of discolored intact skin or non-intact skin or a blood-filled blister. 03/07/22 1300   Dressing Appearance Dry;Open to air;Intact 03/15/22 1000   Drainage Amount None 03/15/22 1000   Appearance Dressing in place, unable to visualize;Intact 03/13/22 2000   Tissue loss description Not applicable 03/15/22 1000   Wound Edges Defined 03/15/22 1000   Care Cleansed with:;Soap and water 03/15/22 1000   Dressing Changed 02/21/22 2340       [REMOVED]      Wound 12/27/21 1100 Traumatic Left anterior Knee (Removed)   12/27/21 1100    Pre-existing: Yes   Primary Wound Type: Traumatic   Side: Left   Orientation: anterior   Location: Knee   Wound Number:    Ankle-Brachial Index:    Pulses:    Removal Indication and Assessment:    Wound Outcome: Healed   (Retired) Wound Type:    (Retired) Wound Length (cm):    (Retired) Wound Width (cm):    (Retired) Depth (cm):    Wound Description (Comments):    Removal Indications:    Removed 02/28/22 1300   Wound Image    02/24/22 1330   Wound WDL WDL 02/28/22 1300   Dressing Appearance Open to air;Dry;Intact 02/28/22 1300   Drainage Amount None 02/28/22 1300   Appearance Intact 02/28/22 1300   Care Cleansed with:;Soap and water 02/07/22 1300       [REMOVED]      Incision/Site 12/13/21 2317 Perineum (Removed)   12/13/21 2317   Present Prior to Hospital Arrival?:    Side:    Location: Perineum   Orientation:    Incision Type:    Closure Method: Other (see comments)   Additional Comments:    Removal Indication and Assessment: site change   Wound Outcome:    Removal Indications:    Removed 03/22/22 1300   Wound Image    03/15/22 1000   Incision WDL WDL 03/21/22 1300   Dressing Appearance Moist drainage 03/21/22 1300   Drainage Amount Small 03/21/22 1300   Drainage Characteristics/Odor Serosanguineous;No odor 03/21/22 1300   Appearance Dressing in place, unable to visualize;Intact 03/23/22 0000   Black (%), Wound Tissue  Color 0 % 03/15/22 1000   Red (%), Wound Tissue Color 99 % 03/15/22 1000   Yellow (%), Wound Tissue Color 0 % 03/15/22 1000   Periwound Area Moist 03/21/22 1300   Wound Edges Defined;Open 03/21/22 1300   Wound Length (cm) 7.5 cm 03/15/22 1000   Wound Width (cm) 11 cm 03/15/22 1000   Wound Depth (cm) 2 cm 03/15/22 1000   Wound Volume (cm^3) 165 cm^3 03/15/22 1000   Wound Surface Area (cm^2) 82.5 cm^2 03/15/22 1000   Care Cleansed with:;Soap and water;Wound cleanser;Applied:;Removed:;Skin Barrier;Moisturizing agent 03/21/22 1300   Dressing Removed;Applied;Changed;Gauze, wet to moist;Gauze;Other (comment) 03/21/22 1300   Packing packing removed 03/20/22 1550   Packing Inserted  1 01/23/22 1800   Packing Removed 1 02/05/22 0730   Periwound Care Moisture barrier applied;Skin barrier film applied 03/21/22 1300   Dressing Change Due 03/22/22 03/21/22 1300       [REMOVED]      Incision/Site 12/27/21 1100 Right Buttocks (Removed)   12/27/21 1100   Present Prior to Hospital Arrival?:    Side: Right   Location: Buttocks   Orientation:    Incision Type:    Closure Method:    Additional Comments:    Removal Indication and Assessment:    Wound Outcome:    Removal Indications:    Removed 12/27/21 1100   Incision WDL WDL 12/26/21 0730   Dressing Appearance Dry;Intact;Dried drainage 12/26/21 0730   Drainage Amount Scant 12/26/21 0730   Drainage Characteristics/Odor Brown 12/26/21 0730   Appearance Dressing in place, unable to visualize 12/27/21 2000   Care Cleansed with:;Antimicrobial agent;Soap and water;Applied:;Other (see comments) 12/26/21 0730   Dressing Removed;Changed;Gauze, wet to dry 12/26/21 0730   Dressing Change Due 12/27/21 12/26/21 1530       [REMOVED]      Incision/Site 12/14/21 1238 Abdomen (Removed)   12/14/21 1238   Present Prior to Hospital Arrival?:    Side:    Location: Abdomen   Orientation:    Incision Type:    Closure Method:    Additional Comments:    Removal Indication and Assessment: site change   Wound  Outcome:    Removal Indications:    Removed 02/07/22 1330   Wound Image    12/27/21 1100   Incision WDL WDL 01/27/22 1045   Dressing Appearance Dry;Intact 01/22/22 0010   Drainage Amount Moderate 01/18/22 0730   Drainage Characteristics/Odor Brown 01/09/22 0715   Appearance Chung;Moist 12/29/21 1100   Periwound Area Moist 12/29/21 1100   Wound Edges Defined;Open 12/29/21 1100   Care Cleansed with:;Sterile normal saline;Applied:;Removed:;Skin Barrier 12/28/21 1330   Dressing Removed;Changed;Gauze, wet to dry 01/01/22 0550   Packing packing removed;packed with;gauze, iodoform 01/01/22 0550   Dressing Change Due 01/02/22 01/01/22 0550       [REMOVED]      Incision/Site 01/01/22 0936 Abdomen (Removed)   01/01/22 0936   Present Prior to Hospital Arrival?:    Side:    Location: Abdomen   Orientation:    Incision Type:    Closure Method:    Additional Comments:    Removal Indication and Assessment: site change   Wound Outcome:    Removal Indications:    Removed 02/07/22 1330   Incision WDL ex 01/23/22 0730   Dressing Appearance Intact;Clean 01/23/22 2010   Drainage Amount Moderate 01/23/22 0730   Drainage Characteristics/Odor Sanguineous 01/23/22 0730   Appearance Dressing in place, unable to visualize 01/23/22 0730   Dressing Foam 01/19/22 0730       [REMOVED]      Incision/Site 01/01/22 0956 Right Groin (Removed)   01/01/22 0956   Present Prior to Hospital Arrival?:    Side: Right   Location: Groin   Orientation:    Incision Type:    Closure Method:    Additional Comments:    Removal Indication and Assessment: site change   Wound Outcome:    Removal Indications:    Removed 02/07/22 1300   Incision WDL WDL 01/22/22 0745   Dressing Appearance Open to air 01/19/22 0730   Drainage Amount None 01/19/22 0730   Care Cleansed with:;Soap and water 01/19/22 0730   Dressing Applied 01/14/22 0800       [REMOVED]      Incision/Site 01/04/22 1444 Abdomen (Removed)   01/04/22 1444   Present Prior to Hospital Arrival?:    Side:     Location: Abdomen   Orientation:    Incision Type:    Closure Method:    Additional Comments:    Removal Indication and Assessment: site change   Wound Outcome:    Removal Indications:    Removed 02/07/22 1300   Incision WDL ex 01/19/22 0730   Dressing Appearance Dry;Intact 01/21/22 0730   Drainage Amount Moderate 01/21/22 0730   Drainage Characteristics/Odor Brown;Serosanguineous 01/21/22 0730   Appearance Intact 01/19/22 0730   Dressing Foam 01/19/22 0730       [REMOVED]      Incision/Site 01/04/22 1444 Buttocks (Removed)   01/04/22 1444   Present Prior to Hospital Arrival?:    Side:    Location: Buttocks   Orientation:    Incision Type:    Closure Method:    Additional Comments:    Removal Indication and Assessment: site change   Wound Outcome:    Removal Indications:    Removed 03/22/22 1300   Wound Image    03/15/22 1000   Incision WDL WDL 03/21/22 1300   Dressing Appearance Moist drainage 03/21/22 1300   Drainage Amount Small 03/21/22 1300   Drainage Characteristics/Odor Serosanguineous;Other (see comments) 03/21/22 1300   Appearance Dressing in place, unable to visualize;Intact 03/23/22 0000   Black (%), Wound Tissue Color 80 % 03/15/22 1000   Red (%), Wound Tissue Color 20 % 03/15/22 1000   Yellow (%), Wound Tissue Color 0 % 03/15/22 1000   Periwound Area Moist 03/21/22 1300   Wound Edges Defined;Open 03/21/22 1300   Wound Length (cm) 10 cm 03/15/22 1000   Wound Width (cm) 6 cm 03/15/22 1000   Wound Depth (cm) 0 cm 03/15/22 1000   Wound Volume (cm^3) 0 cm^3 03/15/22 1000   Wound Surface Area (cm^2) 60 cm^2 03/15/22 1000   Care Cleansed with:;Soap and water;Wound cleanser;Applied:;Removed:;Skin Barrier;Moisturizing agent 03/21/22 1300   Dressing Removed;Applied;Changed;Gauze, wet to moist;Gauze;Other (comment) 03/21/22 1300   Packing packing removed 03/13/22 1300   Periwound Care Moisture barrier applied;Skin barrier film applied 03/21/22 1300   Dressing Change Due 03/22/22 03/21/22 1300       [REMOVED]       Incision/Site 01/07/22 1106 Abdomen (Removed)   01/07/22 1106   Present Prior to Hospital Arrival?:    Side:    Location: Abdomen   Orientation:    Incision Type:    Closure Method:    Additional Comments:    Removal Indication and Assessment: site change   Wound Outcome:    Removal Indications:    Removed 02/07/22 1300   Dressing Appearance Dry;Intact 02/05/22 0730   Drainage Amount None 02/05/22 0730   Drainage Characteristics/Odor Brown;Sanguineous 01/19/22 0730   Dressing Other (comment) 01/10/22 0720       [REMOVED]      Incision/Site 01/10/22 1518 Abdomen (Removed)   01/10/22 1518   Present Prior to Hospital Arrival?:    Side:    Location: Abdomen   Orientation:    Incision Type:    Closure Method:    Additional Comments:    Removal Indication and Assessment:    Wound Outcome: Healed   Removal Indications:    Removed 03/15/22 1000   Wound Image     03/15/22 1000   Incision WDL WDL 03/15/22 1000   Dressing Appearance Moist drainage 03/15/22 1000   Drainage Amount Scant 03/15/22 1000   Drainage Characteristics/Odor Serosanguineous;No odor 03/15/22 1000   Appearance Pink;Hypergranulation;Moist;Dry 03/15/22 1000   Red (%), Wound Tissue Color 30 % 03/15/22 1000   Periwound Area Dry;Moist 03/15/22 1000   Wound Edges Defined 03/15/22 1000   Care Cleansed with:;Soap and water;Applied:;Removed:;Skin Barrier;Moisturizing agent 03/15/22 1000   Dressing Removed;Applied;Changed;Gauze;Non-adherent;Other (comment) 03/15/22 1000   Dressing Change Due 03/18/22 03/15/22 1000       [REMOVED]      Incision/Site 01/12/22 1001 Abdomen (Removed)   01/12/22 1001   Present Prior to Hospital Arrival?:    Side:    Location: Abdomen   Orientation:    Incision Type:    Closure Method:    Additional Comments:    Removal Indication and Assessment: site change   Wound Outcome:    Removal Indications:    Removed 02/07/22 1300   Dressing Appearance Intact;Dry;Clean 02/09/22 0730   Drainage Amount None 02/09/22 0730   Care Cleansed with:;Soap  and water 01/14/22 1000   Dressing Applied;Other (comment) 01/14/22 1000       [REMOVED]      Incision/Site 02/21/22 1318 Abdomen (Removed)   02/21/22 1318   Present Prior to Hospital Arrival?:    Side:    Location: Abdomen   Orientation:    Incision Type:    Closure Method:    Additional Comments:    Removal Indication and Assessment: site change   Wound Outcome:    Removal Indications:    Removed 02/23/22 1330   Dressing Appearance Dry;Intact 02/23/22 0000   Appearance Dressing in place, unable to visualize 02/23/22 0000       Assessment and Plan      Assessment:  1. Sacral wound  2.  Lower extremity wounds     Plan:   1. Apply Duoderm to BLE  2.. Pack sacral wound with Vashe twice daily  3. Turn every 2 hours,   4. Keep pressure off sacral and heels.   5. Cultures pending        Signature:  HERSON Viveros  Wound Care    Date of encounter: 04/07/2022

## 2022-04-07 NOTE — NURSING
Patient febrile today with max temp of 101.5 - temperature responded well with PRN Tylenol. Tolerating tube feedings well. No urine output noted during shift.

## 2022-04-08 NOTE — NURSING
2000 Patient lying in bed resting with eyes closed.  No noted distress. Respiration even and unlabored. Skin warm to touch.  # 8 LPC trach midline and intact. Pt on AC 14/480/5/35% on the vent with no distress. Lung sounds coarse.  Peg tube intact and secure with Nepro 1.8 @ 60 ml/hr. Colostomy to left side quadrant of abdomen with brown stool observed in bag.  Dressing dry and intact to bilateral feet and sacral.  SCD hose intact and secure.  Abdomen and right thigh open to air.  INT intact and secure with no iv complications. Right midline intact and secure with no iv complications. Right chest wall hemodialysis catheter intact and secure. Safety measures ongoing.     2230 Bath given and dressing change to sacral area per orders. Safety measures ongoing.     0000 Lying in bed resting.  No noted distress or acute changes. All lines and tubes intact and secure.  Safety measures ongoing.    0400 Lying in bed resting on vent with same settings in earlier assessment. No acute distress. Safety measures ongoing.    0630 Lying in bed resting. Accuheck 81. All lines and tubes intact and secure. Safety measures ongoing.    0700 Bedside report given

## 2022-04-08 NOTE — RESPIRATORY THERAPY
Patient placed on CPAP 10/5 at this time. Tolerating well with stable vital signs with the exception of increased respiratory rate. Patient has been tachypneic.

## 2022-04-08 NOTE — ASSESSMENT & PLAN NOTE
2/28 - resp culture with pseudomonas and klebsiella  treat with zosyn for 7 days   Blood culture from 2/25 also growing Pseudomonas. Sensitive to zosyn. Will need to continue zosyn for at least 10 days total  - low grade fever since 3/28, CXR- unchanged and repeat CBC- WBC 20.57  - removed malpositioned LUE PICC.  - blood cultures- NGTD after 72  Hours   -4/6-  Temp 101 at noon yesterday- wound cultures with heavy growth gram neg bacilli - will go ahead and add Rocephin and continue to follow cultures    4/7- febrile this morning - added clinda given his aspiration event   4/8 - afebrile- would cultures reviewed - sensitive to Rocephin

## 2022-04-08 NOTE — PLAN OF CARE
Problem: Communication Impairment (Mechanical Ventilation, Invasive)  Goal: Effective Communication  Outcome: Ongoing, Progressing     Problem: Device-Related Complication Risk (Mechanical Ventilation, Invasive)  Goal: Optimal Device Function  Outcome: Ongoing, Progressing     Problem: Inability to Wean (Mechanical Ventilation, Invasive)  Goal: Mechanical Ventilation Liberation  Outcome: Ongoing, Progressing     Problem: Skin and Tissue Injury (Mechanical Ventilation, Invasive)  Goal: Absence of Device-Related Skin and Tissue Injury  Outcome: Ongoing, Progressing     Problem: Ventilator-Induced Lung Injury (Mechanical Ventilation, Invasive)  Goal: Absence of Ventilator-Induced Lung Injury  Outcome: Ongoing, Progressing     Problem: Communication Impairment (Artificial Airway)  Goal: Effective Communication  Outcome: Ongoing, Progressing     Problem: Device-Related Complication Risk (Artificial Airway)  Goal: Optimal Device Function  Outcome: Ongoing, Progressing     Problem: Skin and Tissue Injury (Artificial Airway)  Goal: Absence of Device-Related Skin or Tissue Injury  Outcome: Ongoing, Progressing

## 2022-04-08 NOTE — SUBJECTIVE & OBJECTIVE
Interval History: The patient is doing about the same.  No acute changes.  Remains with CPAP trials.    Review of patient's allergies indicates:  No Known Allergies  Current Facility-Administered Medications   Medication Frequency    0.9%  NaCl infusion (for blood administration) Q24H PRN    0.9%  NaCl infusion PRN    acetaminophen tablet 500 mg Q6H PRN    acetic acid 0.25 % irrigation PRN    albuterol nebulizer solution 2.5 mg Q4H PRN    albuterol-ipratropium 2.5 mg-0.5 mg/3 mL nebulizer solution 3 mL Q6H    bisacodyL EC tablet 10 mg Daily PRN    calcium gluconate 1 g in dextrose 5 % in water (D5W) 5 % 100 mL IVPB (MB+) Once    cefTRIAXone (ROCEPHIN) 1 g in dextrose 5 % in water (D5W) 5 % 50 mL IVPB (MB+) Q24H    clindamycin in D5W 600 mg/50 mL IVPB 600 mg Q8H    dextrose 50% injection 12.5 g PRN    diltiaZEM tablet 90 mg Q6H    glucagon (human recombinant) injection 1 mg PRN    guaiFENesin 100 mg/5 ml syrup 200 mg Q6H    heparin (porcine) injection 4,000 Units PRN    heparin (porcine) injection 5,000 Units Q8H    HYDROcodone-acetaminophen 5-325 mg per tablet 1 tablet Q8H    Followed by    [START ON 4/13/2022] HYDROcodone-acetaminophen 5-325 mg per tablet 1 tablet Q12H    insulin aspart U-100 injection 1-10 Units Q6H    insulin detemir U-100 injection 25 Units QHS    morphine injection 4 mg Q3H PRN    ondansetron injection 8 mg Q6H PRN    pantoprazole suspension 40 mg Daily    predniSONE tablet 5 mg Daily    sevelamer carbonate pwpk 0.8 g TID WM    silver sulfADIAZINE 1% cream Daily PRN    simethicone chewable tablet 80 mg TID PRN       Objective:     Vital Signs (Most Recent):  Temp: 99.2 °F (37.3 °C) (04/08/22 0730)  Pulse: 105 (04/08/22 1243)  Resp: (!) 28 (04/08/22 1243)  BP: (!) 103/49 (04/08/22 0800)  SpO2: 100 % (04/08/22 1243)  O2 Device (Oxygen Therapy): ventilator (04/08/22 0738)   Vital Signs (24h Range):  Temp:  [98.4 °F (36.9 °C)-99.2 °F (37.3 °C)] 99.2 °F (37.3 °C)  Pulse:  [] 105  Resp:   [15-35] 28  SpO2:  [88 %-100 %] 100 %  BP: ()/(40-60) 103/49     Weight: 72.5 kg (159 lb 13.3 oz) (04/08/22 0600)  Body mass index is 22.29 kg/m².  Body surface area is 1.91 meters squared.    I/O last 3 completed shifts:  In: 2660 [NG/GT:2510; IV Piggyback:150]  Out: 200 [Stool:200]    Physical Exam  Vitals reviewed.   HENT:      Head: Atraumatic.   Cardiovascular:      Rate and Rhythm: Normal rate and regular rhythm.   Pulmonary:      Comments: Ventilated.  Abdominal:      General: Abdomen is flat.   Skin:     General: Skin is warm.   Neurological:      Mental Status: He is alert.       Significant Labs:  BMP:   Recent Labs   Lab 04/08/22  1002   GLU 72*   *   K 3.8   CL 95*   CO2 26   BUN 36*   CREATININE 1.49*   CALCIUM 8.2*     CBC:   Recent Labs   Lab 04/08/22  0751   WBC 17.04*   RBC 2.52*   HGB 7.2*   HCT 22.8*   *   MCV 90.5   MCH 28.6   MCHC 31.6*        Significant Imaging:  Labs: Reviewed

## 2022-04-08 NOTE — SUBJECTIVE & OBJECTIVE
Interval History: afebrile. Awake, alert, HR improved     Objective:     Vital Signs (Most Recent):  Temp: 99.2 °F (37.3 °C) (04/08/22 0730)  Pulse: 102 (04/08/22 0800)  Resp: (!) 30 (04/08/22 0800)  BP: (!) 103/49 (04/08/22 0800)  SpO2: 100 % (04/08/22 0800)   Vital Signs (24h Range):  Temp:  [98.4 °F (36.9 °C)-99.2 °F (37.3 °C)] 99.2 °F (37.3 °C)  Pulse:  [] 102  Resp:  [15-41] 30  SpO2:  [88 %-100 %] 100 %  BP: ()/(40-60) 103/49     Weight: 72.5 kg (159 lb 13.3 oz)  Body mass index is 22.29 kg/m².      Intake/Output Summary (Last 24 hours) at 4/8/2022 1101  Last data filed at 4/8/2022 0800  Gross per 24 hour   Intake 1590 ml   Output 100 ml   Net 1490 ml       Physical Exam  Vitals reviewed.   Constitutional:       General: He is not in acute distress.     Comments: Chronically ill appearing   HENT:      Right Ear: External ear normal.      Left Ear: External ear normal.      Mouth/Throat:      Mouth: Mucous membranes are moist.   Eyes:      Pupils: Pupils are equal, round, and reactive to light.   Cardiovascular:      Rate and Rhythm: Regular rhythm.   Pulmonary:      Breath sounds: No wheezing or rales.   Abdominal:      Palpations: Abdomen is soft.   Musculoskeletal:         General: Normal range of motion.      Cervical back: Normal range of motion and neck supple.   Skin:     General: Skin is warm and dry.      Capillary Refill: Capillary refill takes less than 2 seconds.   Neurological:      Mental Status: He is alert. Mental status is at baseline.   Psychiatric:         Mood and Affect: Mood normal.       Vents:  Vent Mode: A/C (04/08/22 0738)  Set Rate: 14 BPM (04/08/22 0738)  Vt Set: 480 mL (04/08/22 0738)  Pressure Support: 12 cmH20 (04/04/22 0340)  PEEP/CPAP: 5 cmH20 (04/08/22 0738)  Oxygen Concentration (%): 35 (04/08/22 0824)  Peak Airway Pressure: 17 cmH2O (04/08/22 0738)  Plateau Pressure: 20 cmH20 (03/07/22 0500)  Total Ve: 12 mL (04/08/22 0738)  F/VT Ratio<105 (RSBI): (!) 72.29  (04/08/22 0738)    Lines/Drains/Airways       Central Venous Catheter Line  Duration                  Hemodialysis Catheter 12/30/21 0900 right internal jugular 99 days              Drain  Duration                  Colostomy 12/18/21 1030 Descending/sigmoid  days         Gastrostomy/Enterostomy 03/09/22 1436 Percutaneous endoscopic gastrostomy (PEG) midline feeding 29 days              Airway  Duration                  Surgical Airway 01/04/22 Shiley 94 days              Peripheral Intravenous Line  Duration                  Peripheral IV - Single Lumen 04/04/22 1430 22 G Posterior;Right Hand 3 days         Peripheral IV - Single Lumen 04/04/22 1500 20 G Anterior;Distal;Right Upper Arm 3 days                    Significant Labs:    CBC/Anemia Profile:  Recent Labs   Lab 04/08/22  0751   WBC 17.04*   HGB 7.2*   HCT 22.8*   *   MCV 90.5   RDW 15.0*        Chemistries:  No results for input(s): NA, K, CL, CO2, BUN, CREATININE, CALCIUM, ALBUMIN, PROT, BILITOT, ALKPHOS, ALT, AST, GLUCOSE, MG, PHOS in the last 48 hours.    All pertinent labs within the past 24 hours have been reviewed.    Significant Imaging:  I have reviewed all pertinent imaging results/findings within the past 24 hours.

## 2022-04-08 NOTE — PLAN OF CARE
Problem: Fluid and Electrolyte Imbalance (Acute Kidney Injury/Impairment)  Goal: Fluid and Electrolyte Balance  Outcome: Ongoing, Progressing     Problem: Renal Function Impairment (Acute Kidney Injury/Impairment)  Goal: Effective Renal Function  Outcome: Ongoing, Progressing     Problem: Fall Injury Risk  Goal: Absence of Fall and Fall-Related Injury  Outcome: Ongoing, Progressing     Problem: Device-Related Complication Risk (Mechanical Ventilation, Invasive)  Goal: Optimal Device Function  Outcome: Ongoing, Progressing     Problem: Inability to Wean (Mechanical Ventilation, Invasive)  Goal: Mechanical Ventilation Liberation  Outcome: Ongoing, Progressing     Problem: Skin and Tissue Injury (Mechanical Ventilation, Invasive)  Goal: Absence of Device-Related Skin and Tissue Injury  Outcome: Ongoing, Progressing

## 2022-04-08 NOTE — ASSESSMENT & PLAN NOTE
Treated anemia   - concern this may be releated to pain and pain medication withdraw - his fentanyl patch was discontinued over the weekend which he has been on for weeks.  -plan - will start PO pain medication q8hrs then decrease to q12 hours after 7 days.   4/8 - improving

## 2022-04-08 NOTE — ASSESSMENT & PLAN NOTE
Continue wound care and antibiotics  3/10/2022 Continue with wound care  3/20/2022 Wound care  4/8/2022  Continue wound care.

## 2022-04-08 NOTE — PLAN OF CARE
Problem: Adult Inpatient Plan of Care  Goal: Plan of Care Review  Outcome: Ongoing, Progressing  Goal: Patient-Specific Goal (Individualized)  Outcome: Ongoing, Progressing  Goal: Absence of Hospital-Acquired Illness or Injury  Outcome: Ongoing, Progressing  Goal: Optimal Comfort and Wellbeing  Outcome: Ongoing, Progressing     Problem: Communication Impairment (Mechanical Ventilation, Invasive)  Goal: Effective Communication  Outcome: Ongoing, Progressing     Problem: Device-Related Complication Risk (Mechanical Ventilation, Invasive)  Goal: Optimal Device Function  Outcome: Ongoing, Progressing     Problem: Inability to Wean (Mechanical Ventilation, Invasive)  Goal: Mechanical Ventilation Liberation  Outcome: Ongoing, Progressing     Problem: Nutrition Impairment (Mechanical Ventilation, Invasive)  Goal: Optimal Nutrition Delivery  Outcome: Ongoing, Progressing     Problem: Skin and Tissue Injury (Mechanical Ventilation, Invasive)  Goal: Absence of Device-Related Skin and Tissue Injury  Outcome: Ongoing, Progressing     Problem: Communication Impairment (Artificial Airway)  Goal: Effective Communication  Outcome: Ongoing, Progressing     Problem: Device-Related Complication Risk (Artificial Airway)  Goal: Optimal Device Function  Outcome: Ongoing, Progressing     Problem: Skin and Tissue Injury (Artificial Airway)  Goal: Absence of Device-Related Skin or Tissue Injury  Outcome: Ongoing, Progressing     Problem: Gas Exchange Impaired  Goal: Optimal Gas Exchange  Outcome: Ongoing, Progressing

## 2022-04-08 NOTE — ASSESSMENT & PLAN NOTE
Was doing trach collar over the weekend but had to be placed back on the vent earlier this week   - will treat his pain- hopefully this will improve his HR and then start back on weaning trails   4/7- aspirated yesterday - febrile this morning. Will start Clindamycin today -   4/8- clinically looks better- will resume PSV today as tolerated

## 2022-04-08 NOTE — PROGRESS NOTES
[facility]  Pulmonology  Progress Note    Patient Name: Og Garcia  MRN: [id number]  Admission Date: 12/23/2021  Hospital Length of Stay: 106 days  Code Status: Prior  Attending Provider: Cecil Abernathy DO  Primary Care Provider: Hector Arndt DNP, FNP-C   Principal Problem: Denice gangrene    Subjective:     Interval History: afebrile. Awake, alert, HR improved     Objective:     Vital Signs (Most Recent):  Temp: 99.2 °F (37.3 °C) (04/08/22 0730)  Pulse: 102 (04/08/22 0800)  Resp: (!) 30 (04/08/22 0800)  BP: (!) 103/49 (04/08/22 0800)  SpO2: 100 % (04/08/22 0800)   Vital Signs (24h Range):  Temp:  [98.4 °F (36.9 °C)-99.2 °F (37.3 °C)] 99.2 °F (37.3 °C)  Pulse:  [] 102  Resp:  [15-41] 30  SpO2:  [88 %-100 %] 100 %  BP: ()/(40-60) 103/49     Weight: 72.5 kg (159 lb 13.3 oz)  Body mass index is 22.29 kg/m².      Intake/Output Summary (Last 24 hours) at 4/8/2022 1101  Last data filed at 4/8/2022 0800  Gross per 24 hour   Intake 1590 ml   Output 100 ml   Net 1490 ml       Physical Exam  Vitals reviewed.   Constitutional:       General: He is not in acute distress.     Comments: Chronically ill appearing   HENT:      Right Ear: External ear normal.      Left Ear: External ear normal.      Mouth/Throat:      Mouth: Mucous membranes are moist.   Eyes:      Pupils: Pupils are equal, round, and reactive to light.   Cardiovascular:      Rate and Rhythm: Regular rhythm.   Pulmonary:      Breath sounds: No wheezing or rales.   Abdominal:      Palpations: Abdomen is soft.   Musculoskeletal:         General: Normal range of motion.      Cervical back: Normal range of motion and neck supple.   Skin:     General: Skin is warm and dry.      Capillary Refill: Capillary refill takes less than 2 seconds.   Neurological:      Mental Status: He is alert. Mental status is at baseline.   Psychiatric:         Mood and Affect: Mood normal.       Vents:  Vent Mode: A/C (04/08/22 0770)  Set  Rate: 14 BPM (04/08/22 0738)  Vt Set: 480 mL (04/08/22 0738)  Pressure Support: 12 cmH20 (04/04/22 0340)  PEEP/CPAP: 5 cmH20 (04/08/22 0738)  Oxygen Concentration (%): 35 (04/08/22 0824)  Peak Airway Pressure: 17 cmH2O (04/08/22 0738)  Plateau Pressure: 20 cmH20 (03/07/22 0500)  Total Ve: 12 mL (04/08/22 0738)  F/VT Ratio<105 (RSBI): (!) 72.29 (04/08/22 0738)    Lines/Drains/Airways       Central Venous Catheter Line  Duration                  Hemodialysis Catheter 12/30/21 0900 right internal jugular 99 days              Drain  Duration                  Colostomy 12/18/21 1030 Descending/sigmoid  days         Gastrostomy/Enterostomy 03/09/22 1436 Percutaneous endoscopic gastrostomy (PEG) midline feeding 29 days              Airway  Duration                  Surgical Airway 01/04/22 Shiley 94 days              Peripheral Intravenous Line  Duration                  Peripheral IV - Single Lumen 04/04/22 1430 22 G Posterior;Right Hand 3 days         Peripheral IV - Single Lumen 04/04/22 1500 20 G Anterior;Distal;Right Upper Arm 3 days                    Significant Labs:    CBC/Anemia Profile:  Recent Labs   Lab 04/08/22  0751   WBC 17.04*   HGB 7.2*   HCT 22.8*   *   MCV 90.5   RDW 15.0*        Chemistries:  No results for input(s): NA, K, CL, CO2, BUN, CREATININE, CALCIUM, ALBUMIN, PROT, BILITOT, ALKPHOS, ALT, AST, GLUCOSE, MG, PHOS in the last 48 hours.    All pertinent labs within the past 24 hours have been reviewed.    Significant Imaging:  I have reviewed all pertinent imaging results/findings within the past 24 hours.    Assessment/Plan:     Chronic respiratory failure  Was doing trach collar over the weekend but had to be placed back on the vent earlier this week   - will treat his pain- hopefully this will improve his HR and then start back on weaning trails   4/7- aspirated yesterday - febrile this morning. Will start Clindamycin today -   4/8- clinically looks better- will resume PSV today as  tolerated     Tachycardia  Treated anemia   - concern this may be releated to pain and pain medication withdraw - his fentanyl patch was discontinued over the weekend which he has been on for weeks.  -plan - will start PO pain medication q8hrs then decrease to q12 hours after 7 days.   4/8 - improving     Fever  2/28 - resp culture with pseudomonas and klebsiella  treat with zosyn for 7 days   Blood culture from 2/25 also growing Pseudomonas. Sensitive to zosyn. Will need to continue zosyn for at least 10 days total  - low grade fever since 3/28, CXR- unchanged and repeat CBC- WBC 20.57  - removed malpositioned LUE PICC.  - blood cultures- NGTD after 72  Hours   -4/6-  Temp 101 at noon yesterday- wound cultures with heavy growth gram neg bacilli - will go ahead and add Rocephin and continue to follow cultures    4/7- febrile this morning - added clinda given his aspiration event   4/8 - afebrile- would cultures reviewed - sensitive to Rocephin     ESRD (end stage renal disease)  Started on HD 12/30   Continue with HD per nephrology      Type 2 diabetes mellitus without complication, without long-term current use of insulin    - Continue with current care      Acute blood loss anemia  No active bleeding    Hgb is 7.2/22.8    Necrotizing fasciitis  S/p multiple surgeries                  FLORESITA Shafer-ACNP  Pulmonology  [facility]

## 2022-04-08 NOTE — PROGRESS NOTES
[facility]  Nephrology  Progress Note    Patient Name: Og Garcia  MRN: [id number]  Admission Date: 12/23/2021  Hospital Length of Stay: 106 days  Attending Provider: Cecil Abernathy DO   Primary Care Physician: Hector Arndt DNP, FNP-C  Principal Problem:Denice gangrene    Subjective:     HPI: The patient is known from the previous nephrology consult at [facility].  He is no at Specialty and continues to need dialysis support.      Interval History: The patient is doing about the same.  No acute changes.  Remains with CPAP trials.    Review of patient's allergies indicates:  No Known Allergies  Current Facility-Administered Medications   Medication Frequency    0.9%  NaCl infusion (for blood administration) Q24H PRN    0.9%  NaCl infusion PRN    acetaminophen tablet 500 mg Q6H PRN    acetic acid 0.25 % irrigation PRN    albuterol nebulizer solution 2.5 mg Q4H PRN    albuterol-ipratropium 2.5 mg-0.5 mg/3 mL nebulizer solution 3 mL Q6H    bisacodyL EC tablet 10 mg Daily PRN    calcium gluconate 1 g in dextrose 5 % in water (D5W) 5 % 100 mL IVPB (MB+) Once    cefTRIAXone (ROCEPHIN) 1 g in dextrose 5 % in water (D5W) 5 % 50 mL IVPB (MB+) Q24H    clindamycin in D5W 600 mg/50 mL IVPB 600 mg Q8H    dextrose 50% injection 12.5 g PRN    diltiaZEM tablet 90 mg Q6H    glucagon (human recombinant) injection 1 mg PRN    guaiFENesin 100 mg/5 ml syrup 200 mg Q6H    heparin (porcine) injection 4,000 Units PRN    heparin (porcine) injection 5,000 Units Q8H    HYDROcodone-acetaminophen 5-325 mg per tablet 1 tablet Q8H    Followed by    [START ON 4/13/2022] HYDROcodone-acetaminophen 5-325 mg per tablet 1 tablet Q12H    insulin aspart U-100 injection 1-10 Units Q6H    insulin detemir U-100 injection 25 Units QHS    morphine injection 4 mg Q3H PRN    ondansetron injection 8 mg Q6H PRN    pantoprazole suspension 40 mg Daily    predniSONE tablet 5 mg Daily    sevelamer carbonate  pwpk 0.8 g TID WM    silver sulfADIAZINE 1% cream Daily PRN    simethicone chewable tablet 80 mg TID PRN       Objective:     Vital Signs (Most Recent):  Temp: 99.2 °F (37.3 °C) (04/08/22 0730)  Pulse: 105 (04/08/22 1243)  Resp: (!) 28 (04/08/22 1243)  BP: (!) 103/49 (04/08/22 0800)  SpO2: 100 % (04/08/22 1243)  O2 Device (Oxygen Therapy): ventilator (04/08/22 0738)   Vital Signs (24h Range):  Temp:  [98.4 °F (36.9 °C)-99.2 °F (37.3 °C)] 99.2 °F (37.3 °C)  Pulse:  [] 105  Resp:  [15-35] 28  SpO2:  [88 %-100 %] 100 %  BP: ()/(40-60) 103/49     Weight: 72.5 kg (159 lb 13.3 oz) (04/08/22 0600)  Body mass index is 22.29 kg/m².  Body surface area is 1.91 meters squared.    I/O last 3 completed shifts:  In: 2660 [NG/GT:2510; IV Piggyback:150]  Out: 200 [Stool:200]    Physical Exam  Vitals reviewed.   HENT:      Head: Atraumatic.   Cardiovascular:      Rate and Rhythm: Normal rate and regular rhythm.   Pulmonary:      Comments: Ventilated.  Abdominal:      General: Abdomen is flat.   Skin:     General: Skin is warm.   Neurological:      Mental Status: He is alert.       Significant Labs:  BMP:   Recent Labs   Lab 04/08/22  1002   GLU 72*   *   K 3.8   CL 95*   CO2 26   BUN 36*   CREATININE 1.49*   CALCIUM 8.2*     CBC:   Recent Labs   Lab 04/08/22  0751   WBC 17.04*   RBC 2.52*   HGB 7.2*   HCT 22.8*   *   MCV 90.5   MCH 28.6   MCHC 31.6*        Significant Imaging:  Labs: Reviewed    Assessment/Plan:     ESRD (end stage renal disease)  4/7/2022  Dialysis as scheduled.  4/8/2022  Continue with dialysis as scheduled.    Necrotizing fasciitis  Continue wound care and antibiotics  3/10/2022 Continue with wound care  3/20/2022 Wound care  4/8/2022  Continue wound care.        Thank you for your consult. I will follow-up with patient. Please contact us if you have any additional questions.    Edwin Pryor Jr, MD  Nephrology  [facility]

## 2022-04-08 NOTE — RESPIRATORY THERAPY
Patient placed back on A/C at this time. CPAP 10/5 completed from [id number]. Patient with increased respiratory rate throughout trial.

## 2022-04-09 NOTE — SUBJECTIVE & OBJECTIVE
Interval History: low grade fever, increased RR on exam. Staff states he was just turned. Will treat pain.     Objective:     Vital Signs (Most Recent):  Temp: 98.9 °F (37.2 °C) (04/09/22 0845)  Pulse: (!) 112 (04/09/22 1015)  Resp: (!) 34 (04/09/22 1018)  BP: 110/63 (04/09/22 1015)  SpO2: 100 % (04/09/22 1015)   Vital Signs (24h Range):  Temp:  [98.9 °F (37.2 °C)-101 °F (38.3 °C)] 98.9 °F (37.2 °C)  Pulse:  [] 112  Resp:  [17-39] 34  SpO2:  [94 %-100 %] 100 %  BP: ()/(43-63) 110/63     Weight: 72.5 kg (159 lb 13.3 oz)  Body mass index is 22.29 kg/m².      Intake/Output Summary (Last 24 hours) at 4/9/2022 1146  Last data filed at 4/9/2022 0600  Gross per 24 hour   Intake 1320 ml   Output 200 ml   Net 1120 ml       Physical Exam    Vents:  Vent Mode: A/C (04/09/22 0724)  Set Rate: 14 BPM (04/09/22 0724)  Vt Set: 480 mL (04/09/22 0724)  Pressure Support: 10 cmH20 (04/08/22 1243)  PEEP/CPAP: 5 cmH20 (04/09/22 0724)  Oxygen Concentration (%): 35 (04/09/22 0724)  Peak Airway Pressure: 23 cmH2O (04/09/22 0724)  Plateau Pressure: 20 cmH20 (03/07/22 0500)  Total Ve: 12.6 mL (04/09/22 0724)  F/VT Ratio<105 (RSBI): (!) 64.27 (04/09/22 0724)    Lines/Drains/Airways       Central Venous Catheter Line  Duration                  Hemodialysis Catheter 12/30/21 0900 right internal jugular 100 days              Drain  Duration                  Colostomy 12/18/21 1030 Descending/sigmoid  days         Gastrostomy/Enterostomy 03/09/22 1436 Percutaneous endoscopic gastrostomy (PEG) midline feeding 30 days              Airway  Duration                  Surgical Airway 01/04/22 [place] 95 days              Peripheral Intravenous Line  Duration                  Peripheral IV - Single Lumen 04/04/22 1430 22 G Posterior;Right Hand 4 days         Peripheral IV - Single Lumen 04/04/22 1500 20 G Anterior;Distal;Right Upper Arm 4 days                    Significant Labs:    CBC/Anemia Profile:  Recent Labs   Lab 04/08/22  0751    WBC 17.04*   HGB 7.2*   HCT 22.8*   *   MCV 90.5   RDW 15.0*        Chemistries:  Recent Labs   Lab 04/08/22  1002   *   K 3.8   CL 95*   CO2 26   BUN 36*   CREATININE 1.49*   CALCIUM 8.2*       All pertinent labs within the past 24 hours have been reviewed.    Significant Imaging:  I have reviewed all pertinent imaging results/findings within the past 24 hours.

## 2022-04-09 NOTE — ASSESSMENT & PLAN NOTE
Was doing trach collar over the weekend but had to be placed back on the vent earlier this week   - will treat his pain- hopefully this will improve his HR and then start back on weaning trails   4/7- aspirated yesterday - febrile this morning. Will start Clindamycin today -   4/8- clinically looks better- will resume PSV today as tolerated   4/9- increased respiratory rate and tachycardic on exam.  Patient had just been turned.  This is all likely secondary to pain.  Will treat his pain and then increase his CPAP to 4 hours t.i.d. as tolerated.

## 2022-04-09 NOTE — PROGRESS NOTES
[facility]  Pulmonology  Progress Note    Patient Name: Og aGrcia  MRN: [id number]  Admission Date: 12/23/2021  Hospital Length of Stay: 107 days  Code Status: Prior  Attending Provider: Cecil Abernathy DO  Primary Care Provider: Hector Arndt DNP, FNP-C   Principal Problem: Denice gangrene    Subjective:     Interval History: low grade fever, increased RR on exam. Staff states he was just turned. Will treat pain.     Objective:     Vital Signs (Most Recent):  Temp: 98.9 °F (37.2 °C) (04/09/22 0845)  Pulse: (!) 112 (04/09/22 1015)  Resp: (!) 34 (04/09/22 1018)  BP: 110/63 (04/09/22 1015)  SpO2: 100 % (04/09/22 1015)   Vital Signs (24h Range):  Temp:  [98.9 °F (37.2 °C)-101 °F (38.3 °C)] 98.9 °F (37.2 °C)  Pulse:  [] 112  Resp:  [17-39] 34  SpO2:  [94 %-100 %] 100 %  BP: ()/(43-63) 110/63     Weight: 72.5 kg (159 lb 13.3 oz)  Body mass index is 22.29 kg/m².      Intake/Output Summary (Last 24 hours) at 4/9/2022 1146  Last data filed at 4/9/2022 0600  Gross per 24 hour   Intake 1320 ml   Output 200 ml   Net 1120 ml       Physical Exam    Vents:  Vent Mode: A/C (04/09/22 0724)  Set Rate: 14 BPM (04/09/22 0724)  Vt Set: 480 mL (04/09/22 0724)  Pressure Support: 10 cmH20 (04/08/22 1243)  PEEP/CPAP: 5 cmH20 (04/09/22 0724)  Oxygen Concentration (%): 35 (04/09/22 0724)  Peak Airway Pressure: 23 cmH2O (04/09/22 0724)  Plateau Pressure: 20 cmH20 (03/07/22 0500)  Total Ve: 12.6 mL (04/09/22 0724)  F/VT Ratio<105 (RSBI): (!) 64.27 (04/09/22 0724)    Lines/Drains/Airways       Central Venous Catheter Line  Duration                  Hemodialysis Catheter 12/30/21 0900 right internal jugular 100 days              Drain  Duration                  Colostomy 12/18/21 1030 Descending/sigmoid  days         Gastrostomy/Enterostomy 03/09/22 1436 Percutaneous endoscopic gastrostomy (PEG) midline feeding 30 days              Airway  Duration                  Surgical Airway  01/04/22 Shiley 95 days              Peripheral Intravenous Line  Duration                  Peripheral IV - Single Lumen 04/04/22 1430 22 G Posterior;Right Hand 4 days         Peripheral IV - Single Lumen 04/04/22 1500 20 G Anterior;Distal;Right Upper Arm 4 days                    Significant Labs:    CBC/Anemia Profile:  Recent Labs   Lab 04/08/22  0751   WBC 17.04*   HGB 7.2*   HCT 22.8*   *   MCV 90.5   RDW 15.0*        Chemistries:  Recent Labs   Lab 04/08/22  1002   *   K 3.8   CL 95*   CO2 26   BUN 36*   CREATININE 1.49*   CALCIUM 8.2*       All pertinent labs within the past 24 hours have been reviewed.    Significant Imaging:  I have reviewed all pertinent imaging results/findings within the past 24 hours.    Assessment/Plan:     Chronic respiratory failure  Was doing trach collar over the weekend but had to be placed back on the vent earlier this week   - will treat his pain- hopefully this will improve his HR and then start back on weaning trails   4/7- aspirated yesterday - febrile this morning. Will start Clindamycin today -   4/8- clinically looks better- will resume PSV today as tolerated   4/9- increased respiratory rate and tachycardic on exam.  Patient had just been turned.  This is all likely secondary to pain.  Will treat his pain and then increase his CPAP to 4 hours t.i.d. as tolerated.    Tachycardia  Treated anemia   - concern this may be releated to pain and pain medication withdraw - his fentanyl patch was discontinued over the weekend which he has been on for weeks.  -plan - will start PO pain medication q8hrs then decrease to q12 hours after 7 days.   4/8 - improving   4/9-heart rate up on exam.  Secondary to pain.  Will treat p.r.n. pain medication.    Pressure ulcer of sacral region, unstageable  Culture and sensitivity reviewed -discontinued Rocephin started Zosyn    Fever  2/28 - resp culture with pseudomonas and klebsiella  treat with zosyn for 7 days   Blood culture from  2/25 also growing Pseudomonas. Sensitive to zosyn. Will need to continue zosyn for at least 10 days total  - low grade fever since 3/28, CXR- unchanged and repeat CBC- WBC 20.57  - removed malpositioned LUE PICC.  - blood cultures- NGTD after 72  Hours   -4/6-  Temp 101 at noon yesterday- wound cultures with heavy growth gram neg bacilli - will go ahead and add Rocephin and continue to follow cultures    4/7- febrile this morning - added clinda given his aspiration event   4/8 - afebrile- would cultures reviewed - sensitive to Rocephin   4/9-  wound culture sensitivity has returned; escalating to Zosyn specially given his fever overnight.    ESRD (end stage renal disease)  Started on HD 12/30   Continue with HD per nephrology      Type 2 diabetes mellitus without complication, without long-term current use of insulin    - Continue with current care-fasting glucose 164.                    FLORESITA Shafer-ACNP  Pulmonology  [facility]\""

## 2022-04-09 NOTE — PROGRESS NOTES
[facility]  Nephrology  Progress Note    Patient Name: Og Garcia  MRN: [id number]  Admission Date: 12/23/2021  Hospital Length of Stay: 107 days  Attending Provider: Cecil Abernathy DO   Primary Care Physician: Hector Arndt DNP, FNP-C  Principal Problem:Denice gangrene    Subjective:     HPI: The patient is known from the previous nephrology consult at [facility].  He is no at Specialty and continues to need dialysis support.      Interval History:  He is on the ventilator.  Blood pressure borderline low.    Review of patient's allergies indicates:  No Known Allergies  Current Facility-Administered Medications   Medication Frequency    0.9%  NaCl infusion (for blood administration) Q24H PRN    0.9%  NaCl infusion PRN    acetaminophen tablet 500 mg Q6H PRN    acetic acid 0.25 % irrigation PRN    albuterol nebulizer solution 2.5 mg Q4H PRN    albuterol-ipratropium 2.5 mg-0.5 mg/3 mL nebulizer solution 3 mL Q6H    bisacodyL EC tablet 10 mg Daily PRN    calcium gluconate 1 g in dextrose 5 % in water (D5W) 5 % 100 mL IVPB (MB+) Once    dextrose 50% injection 12.5 g PRN    diltiaZEM tablet 90 mg Q6H    glucagon (human recombinant) injection 1 mg PRN    guaiFENesin 100 mg/5 ml syrup 200 mg Q6H    heparin (porcine) injection 4,000 Units PRN    heparin (porcine) injection 5,000 Units Q8H    HYDROcodone-acetaminophen 5-325 mg per tablet 1 tablet Q8H    Followed by    [START ON 4/13/2022] HYDROcodone-acetaminophen 5-325 mg per tablet 1 tablet Q12H    insulin aspart U-100 injection 1-10 Units Q6H    insulin detemir U-100 injection 25 Units QHS    morphine injection 4 mg Q3H PRN    ondansetron injection 8 mg Q6H PRN    pantoprazole suspension 40 mg Daily    piperacillin-tazobactam (ZOSYN) 4.5 g in dextrose 5 % in water (D5W) 5 % 100 mL IVPB (MB+) Q12H    predniSONE tablet 5 mg Daily    sevelamer carbonate pwpk 0.8 g TID WM    silver sulfADIAZINE 1% cream Daily PRN     simethicone chewable tablet 80 mg TID PRN       Objective:     Vital Signs (Most Recent):  Temp: 98.4 °F (36.9 °C) (04/09/22 1500)  Pulse: 103 (04/09/22 1845)  Resp: (!) 40 (04/09/22 1845)  BP: (!) 85/43 (04/09/22 1845)  SpO2: (!) 94 % (04/09/22 1845)  O2 Device (Oxygen Therapy): ventilator (04/09/22 0724)   Vital Signs (24h Range):  Temp:  [98.4 °F (36.9 °C)-101 °F (38.3 °C)] 98.4 °F (36.9 °C)  Pulse:  [] 103  Resp:  [17-43] 40  SpO2:  [86 %-100 %] 94 %  BP: ()/(39-63) 85/43     Weight: 72.5 kg (159 lb 13.3 oz) (04/08/22 0600)  Body mass index is 22.29 kg/m².  Body surface area is 1.91 meters squared.    I/O last 3 completed shifts:  In: 2510 [P.O.:360; NG/GT:2100; IV Piggyback:50]  Out: 1300 [Other:1000; Stool:300]    Physical Exam  Constitutional:       Appearance: He is ill-appearing.   Neck:      Trachea: Tracheostomy present.   Cardiovascular:      Rate and Rhythm: Tachycardia present. Rhythm irregular.   Pulmonary:      Breath sounds: No wheezing or rales.   Abdominal:      Tenderness: There is no abdominal tenderness.   Musculoskeletal:      Cervical back: Neck supple.      Right lower leg: No edema.      Left lower leg: No edema.       Significant Labs:  BMP:   Recent Labs   Lab 04/08/22  1002   GLU 72*   *   K 3.8   CL 95*   CO2 26   BUN 36*   CREATININE 1.49*   CALCIUM 8.2*        Significant Imaging:      Assessment/Plan:     * Denice gangrene  Continue wound care      ESRD (end stage renal disease)  Dialysis MWF    Necrotizing fasciitis  Continue wound care and antibiotics      Hypertension  Blood pressure borderline low.  He is requiring diltiazem for AFib        Thank you for your consult.     Huber Mcnamara MD  Nephrology  [facility]

## 2022-04-09 NOTE — PLAN OF CARE
Problem: Adult Inpatient Plan of Care  Goal: Plan of Care Review  Outcome: Ongoing, Progressing  Goal: Patient-Specific Goal (Individualized)  Outcome: Ongoing, Progressing  Goal: Absence of Hospital-Acquired Illness or Injury  Outcome: Ongoing, Progressing  Goal: Optimal Comfort and Wellbeing  Outcome: Ongoing, Progressing  Goal: Readiness for Transition of Care  Outcome: Ongoing, Progressing     Problem: Adjustment to Illness (Sepsis/Septic Shock)  Goal: Optimal Coping  Outcome: Ongoing, Progressing     Problem: Bleeding (Sepsis/Septic Shock)  Goal: Absence of Bleeding  Outcome: Ongoing, Progressing     Problem: Glycemic Control Impaired (Sepsis/Septic Shock)  Goal: Blood Glucose Level Within Desired Range  Outcome: Ongoing, Progressing     Problem: Infection Progression (Sepsis/Septic Shock)  Goal: Absence of Infection Signs and Symptoms  Outcome: Ongoing, Progressing     Problem: Nutrition Impaired (Sepsis/Septic Shock)  Goal: Optimal Nutrition Intake  Outcome: Ongoing, Progressing     Problem: Fluid and Electrolyte Imbalance (Acute Kidney Injury/Impairment)  Goal: Fluid and Electrolyte Balance  Outcome: Ongoing, Progressing     Problem: Oral Intake Inadequate (Acute Kidney Injury/Impairment)  Goal: Optimal Nutrition Intake  Outcome: Ongoing, Progressing     Problem: Renal Function Impairment (Acute Kidney Injury/Impairment)  Goal: Effective Renal Function  Outcome: Ongoing, Progressing     Problem: Infection  Goal: Absence of Infection Signs and Symptoms  Outcome: Ongoing, Progressing     Problem: Fall Injury Risk  Goal: Absence of Fall and Fall-Related Injury  Outcome: Ongoing, Progressing     Problem: Communication Impairment (Mechanical Ventilation, Invasive)  Goal: Effective Communication  Outcome: Ongoing, Progressing     Problem: Device-Related Complication Risk (Mechanical Ventilation, Invasive)  Goal: Optimal Device Function  Outcome: Ongoing, Progressing     Problem: Inability to Wean (Mechanical  Ventilation, Invasive)  Goal: Mechanical Ventilation Liberation  Outcome: Ongoing, Progressing     Problem: Nutrition Impairment (Mechanical Ventilation, Invasive)  Goal: Optimal Nutrition Delivery  Outcome: Ongoing, Progressing     Problem: Skin and Tissue Injury (Mechanical Ventilation, Invasive)  Goal: Absence of Device-Related Skin and Tissue Injury  Outcome: Ongoing, Progressing     Problem: Ventilator-Induced Lung Injury (Mechanical Ventilation, Invasive)  Goal: Absence of Ventilator-Induced Lung Injury  Outcome: Ongoing, Progressing     Problem: Communication Impairment (Artificial Airway)  Goal: Effective Communication  Outcome: Ongoing, Progressing     Problem: Device-Related Complication Risk (Artificial Airway)  Goal: Optimal Device Function  Outcome: Ongoing, Progressing     Problem: Skin and Tissue Injury (Artificial Airway)  Goal: Absence of Device-Related Skin or Tissue Injury  Outcome: Ongoing, Progressing     Problem: Skin Injury Risk Increased  Goal: Skin Health and Integrity  Outcome: Ongoing, Progressing     Problem: Device-Related Complication Risk (Hemodialysis)  Goal: Safe, Effective Therapy Delivery  Outcome: Ongoing, Progressing     Problem: Hemodynamic Instability (Hemodialysis)  Goal: Effective Tissue Perfusion  Outcome: Ongoing, Progressing     Problem: Infection (Hemodialysis)  Goal: Absence of Infection Signs and Symptoms  Outcome: Ongoing, Progressing     Problem: Diabetes Comorbidity  Goal: Blood Glucose Level Within Targeted Range  Outcome: Ongoing, Progressing     Problem: Impaired Wound Healing  Goal: Optimal Wound Healing  Outcome: Ongoing, Progressing     Problem: Gas Exchange Impaired  Goal: Optimal Gas Exchange  Outcome: Ongoing, Progressing

## 2022-04-09 NOTE — SUBJECTIVE & OBJECTIVE
Interval History:  He is on the ventilator.  Blood pressure borderline low.    Review of patient's allergies indicates:  No Known Allergies  Current Facility-Administered Medications   Medication Frequency    0.9%  NaCl infusion (for blood administration) Q24H PRN    0.9%  NaCl infusion PRN    acetaminophen tablet 500 mg Q6H PRN    acetic acid 0.25 % irrigation PRN    albuterol nebulizer solution 2.5 mg Q4H PRN    albuterol-ipratropium 2.5 mg-0.5 mg/3 mL nebulizer solution 3 mL Q6H    bisacodyL EC tablet 10 mg Daily PRN    calcium gluconate 1 g in dextrose 5 % in water (D5W) 5 % 100 mL IVPB (MB+) Once    dextrose 50% injection 12.5 g PRN    diltiaZEM tablet 90 mg Q6H    glucagon (human recombinant) injection 1 mg PRN    guaiFENesin 100 mg/5 ml syrup 200 mg Q6H    heparin (porcine) injection 4,000 Units PRN    heparin (porcine) injection 5,000 Units Q8H    HYDROcodone-acetaminophen 5-325 mg per tablet 1 tablet Q8H    Followed by    [START ON 4/13/2022] HYDROcodone-acetaminophen 5-325 mg per tablet 1 tablet Q12H    insulin aspart U-100 injection 1-10 Units Q6H    insulin detemir U-100 injection 25 Units QHS    morphine injection 4 mg Q3H PRN    ondansetron injection 8 mg Q6H PRN    pantoprazole suspension 40 mg Daily    piperacillin-tazobactam (ZOSYN) 4.5 g in dextrose 5 % in water (D5W) 5 % 100 mL IVPB (MB+) Q12H    predniSONE tablet 5 mg Daily    sevelamer carbonate pwpk 0.8 g TID WM    silver sulfADIAZINE 1% cream Daily PRN    simethicone chewable tablet 80 mg TID PRN       Objective:     Vital Signs (Most Recent):  Temp: 98.4 °F (36.9 °C) (04/09/22 1500)  Pulse: 103 (04/09/22 1845)  Resp: (!) 40 (04/09/22 1845)  BP: (!) 85/43 (04/09/22 1845)  SpO2: (!) 94 % (04/09/22 1845)  O2 Device (Oxygen Therapy): ventilator (04/09/22 0724)   Vital Signs (24h Range):  Temp:  [98.4 °F (36.9 °C)-101 °F (38.3 °C)] 98.4 °F (36.9 °C)  Pulse:  [] 103  Resp:  [17-43] 40  SpO2:  [86 %-100 %] 94 %  BP: ()/(39-63) 85/43      Weight: 72.5 kg (159 lb 13.3 oz) (04/08/22 0600)  Body mass index is 22.29 kg/m².  Body surface area is 1.91 meters squared.    I/O last 3 completed shifts:  In: 2510 [P.O.:360; NG/GT:2100; IV Piggyback:50]  Out: 1300 [Other:1000; Stool:300]    Physical Exam  Constitutional:       Appearance: He is ill-appearing.   Neck:      Trachea: Tracheostomy present.   Cardiovascular:      Rate and Rhythm: Tachycardia present. Rhythm irregular.   Pulmonary:      Breath sounds: No wheezing or rales.   Abdominal:      Tenderness: There is no abdominal tenderness.   Musculoskeletal:      Cervical back: Neck supple.      Right lower leg: No edema.      Left lower leg: No edema.       Significant Labs:  BMP:   Recent Labs   Lab 04/08/22  1002   GLU 72*   *   K 3.8   CL 95*   CO2 26   BUN 36*   CREATININE 1.49*   CALCIUM 8.2*        Significant Imaging:

## 2022-04-09 NOTE — ASSESSMENT & PLAN NOTE
2/28 - resp culture with pseudomonas and klebsiella  treat with zosyn for 7 days   Blood culture from 2/25 also growing Pseudomonas. Sensitive to zosyn. Will need to continue zosyn for at least 10 days total  - low grade fever since 3/28, CXR- unchanged and repeat CBC- WBC 20.57  - removed malpositioned LUE PICC.  - blood cultures- NGTD after 72  Hours   -4/6-  Temp 101 at noon yesterday- wound cultures with heavy growth gram neg bacilli - will go ahead and add Rocephin and continue to follow cultures    4/7- febrile this morning - added clinda given his aspiration event   4/8 - afebrile- would cultures reviewed - sensitive to Rocephin   4/9-  wound culture sensitivity has returned; escalating to Zosyn specially given his fever overnight.

## 2022-04-09 NOTE — ASSESSMENT & PLAN NOTE
Treated anemia   - concern this may be releated to pain and pain medication withdraw - his fentanyl patch was discontinued over the weekend which he has been on for weeks.  -plan - will start PO pain medication q8hrs then decrease to q12 hours after 7 days.   4/8 - improving   4/9-heart rate up on exam.  Secondary to pain.  Will treat p.r.n. pain medication.

## 2022-04-10 PROBLEM — I95.9 HYPOTENSION: Status: ACTIVE | Noted: 2022-01-01

## 2022-04-10 NOTE — ASSESSMENT & PLAN NOTE
Treated with 2 LNS overnight - febrile this morning - concern for sepsis - add MRSA coverage - pan culture - levophed for MAP >60

## 2022-04-10 NOTE — PROGRESS NOTES
[facility]  Pulmonology  Progress Note    Patient Name: Og Garcia  MRN: [id number]  Admission Date: 12/23/2021  Hospital Length of Stay: 108 days  Code Status: Prior  Attending Provider: Cecil Abernathy DO  Primary Care Provider: Hector Arndt DNP, FNP-C   Principal Problem: Denice gangrene    Subjective:     Interval History: Febrile, Tmax 103.1 this morning. BP low overnight - treated with 2 LNS     Objective:     Vital Signs (Most Recent):  Temp: 100.1 °F (37.8 °C) (04/10/22 0815)  Pulse: 88 (04/10/22 1227)  Resp: 12 (04/10/22 1227)  BP: (!) 79/36 (04/10/22 0930)  SpO2: 100 % (04/10/22 1227)   Vital Signs (24h Range):  Temp:  [98.4 °F (36.9 °C)-103.1 °F (39.5 °C)] 100.1 °F (37.8 °C)  Pulse:  [] 88  Resp:  [12-43] 12  SpO2:  [83 %-100 %] 100 %  BP: ()/(35-53) 79/36     Weight: 72.5 kg (159 lb 13.3 oz)  Body mass index is 22.29 kg/m².      Intake/Output Summary (Last 24 hours) at 4/10/2022 1234  Last data filed at 4/10/2022 0900  Gross per 24 hour   Intake 2410 ml   Output 1000 ml   Net 1410 ml       Physical Exam  Vitals reviewed.   Constitutional:       General: He is not in acute distress.     Comments: Chronically ill appearing   HENT:      Right Ear: External ear normal.      Left Ear: External ear normal.      Mouth/Throat:      Mouth: Mucous membranes are moist.   Eyes:      Pupils: Pupils are equal, round, and reactive to light.   Cardiovascular:      Rate and Rhythm: Normal rate and regular rhythm.   Pulmonary:      Breath sounds: No wheezing or rales.   Abdominal:      Palpations: Abdomen is soft.   Musculoskeletal:         General: Normal range of motion.      Cervical back: Normal range of motion and neck supple.   Skin:     General: Skin is warm and dry.      Capillary Refill: Capillary refill takes less than 2 seconds.   Neurological:      Mental Status: He is alert. Mental status is at baseline.   Psychiatric:         Mood and Affect: Mood normal.        Vents:  Vent Mode: A/C (12:15  placed back onACV. CATIE CPAP WELL X4HRS) (04/10/22 1227)  Ventilator Initiated: Yes (04/10/22 0343)  Set Rate: 14 BPM (04/10/22 1227)  Vt Set: 480 mL (04/10/22 1227)  Pressure Support: 10 cmH20 (04/10/22 0800)  PEEP/CPAP: 5 cmH20 (04/10/22 1227)  Oxygen Concentration (%): 35 (04/10/22 1227)  Peak Airway Pressure: 15 cmH2O (04/10/22 1227)  Plateau Pressure: 20 cmH20 (03/07/22 0500)  Total Ve: 6.7 mL (04/10/22 1227)  F/VT Ratio<105 (RSBI): (!) 37.15 (04/10/22 1227)    Lines/Drains/Airways       Central Venous Catheter Line  Duration                  Hemodialysis Catheter 12/30/21 0900 right internal jugular 101 days              Drain  Duration                  Colostomy 12/18/21 1030 Descending/sigmoid  days         Gastrostomy/Enterostomy 03/09/22 1436 Percutaneous endoscopic gastrostomy (PEG) midline feeding 31 days              Airway  Duration                  Surgical Airway 01/04/22 Shiley 96 days              Peripheral Intravenous Line  Duration                  Peripheral IV - Single Lumen 04/04/22 1430 22 G Posterior;Right Hand 5 days         Peripheral IV - Single Lumen 04/10/22 1046 22 G Left;Posterior Hand <1 day                    Significant Labs:    CBC/Anemia Profile:  No results for input(s): WBC, HGB, HCT, PLT, MCV, RDW, IRON, FERRITIN, RETIC, FOLATE, [id number], OCCULTBLOOD in the last 48 hours.     Chemistries:  No results for input(s): NA, K, CL, CO2, BUN, CREATININE, CALCIUM, ALBUMIN, PROT, BILITOT, ALKPHOS, ALT, AST, GLUCOSE, MG, PHOS in the last 48 hours.    All pertinent labs within the past 24 hours have been reviewed.    Significant Imaging:  I have reviewed all pertinent imaging results/findings within the past 24 hours.    Assessment/Plan:     Hypotension  Treated with 2 LNS overnight - febrile this morning - concern for sepsis - add MRSA coverage - pan culture - levophed for MAP >60     Chronic respiratory failure  Was doing trach collar over  the weekend but had to be placed back on the vent earlier this week   - will treat his pain- hopefully this will improve his HR and then start back on weaning trails   4/7- aspirated yesterday - febrile this morning. Will start Clindamycin today -   4/8- clinically looks better- will resume PSV today as tolerated   4/9- increased respiratory rate and tachycardic on exam.  Patient had just been turned.  This is all likely secondary to pain.  Will treat his pain and then increase his CPAP to 4 hours t.i.d. as tolerated.  4/10 - PSV as tolerated     Pressure ulcer of sacral region, unstageable  Culture and sensitivity reviewed -discontinued Rocephin started Zosyn    Fever  2/28 - resp culture with pseudomonas and klebsiella  treat with zosyn for 7 days   Blood culture from 2/25 also growing Pseudomonas. Sensitive to zosyn. Will need to continue zosyn for at least 10 days total  - low grade fever since 3/28, CXR- unchanged and repeat CBC- WBC 20.57  - removed malpositioned LUE PICC.  - blood cultures- NGTD after 72  Hours   -4/6-  Temp 101 at noon yesterday- wound cultures with heavy growth gram neg bacilli - will go ahead and add Rocephin and continue to follow cultures    4/7- febrile this morning - added clinda given his aspiration event   4/8 - afebrile- would cultures reviewed - sensitive to Rocephin   4/9-  wound culture sensitivity has returned; escalating to Zosyn specially given his fever overnight.  4/10--- febrile - TMAx 103.1 - will add MRSA coverage - pan culture       ESRD (end stage renal disease)  Started on HD 12/30   Continue with HD per nephrology      Type 2 diabetes mellitus without complication, without long-term current use of insulin    - Continue with current care-fasting glucose 164.       Acute blood loss anemia  No active bleeding    Hgb is 7.2/22.8    Necrotizing fasciitis  S/p multiple surgeries     Hypertension  Now hypotensive ---       This includes 32 minutes of critical care time spent  evaluating and managing patient. This includes time spent at bedside, reviewing labs, data, xrays and monitoring for acute decompensation.            FLORESITA Shafer-ACNP  Pulmonology  [facility]

## 2022-04-10 NOTE — NURSING
0630 Lying in bed, eyes closed, resp even via vent with AC mode , trach midline and intact, NS bolus infusing @250 cc/hr. NAD Safety measures ongoing.

## 2022-04-10 NOTE — NURSING
1463 - Secure chat to Dr. El. Pt has diltiazem 90 mg q6hr via peg tube. Prior to administration of diltiazem bp was 90's., ST on telemetry, current bp 65/45, MAP 51.  Bolus NS ordered per

## 2022-04-10 NOTE — ASSESSMENT & PLAN NOTE
Was doing trach collar over the weekend but had to be placed back on the vent earlier this week   - will treat his pain- hopefully this will improve his HR and then start back on weaning trails   4/7- aspirated yesterday - febrile this morning. Will start Clindamycin today -   4/8- clinically looks better- will resume PSV today as tolerated   4/9- increased respiratory rate and tachycardic on exam.  Patient had just been turned.  This is all likely secondary to pain.  Will treat his pain and then increase his CPAP to 4 hours t.i.d. as tolerated.  4/10 - PSV as tolerated

## 2022-04-10 NOTE — SUBJECTIVE & OBJECTIVE
Interval History: Febrile, Tmax 103.1 this morning. BP low overnight - treated with 2 LNS     Objective:     Vital Signs (Most Recent):  Temp: 100.1 °F (37.8 °C) (04/10/22 0815)  Pulse: 88 (04/10/22 1227)  Resp: 12 (04/10/22 1227)  BP: (!) 79/36 (04/10/22 0930)  SpO2: 100 % (04/10/22 1227)   Vital Signs (24h Range):  Temp:  [98.4 °F (36.9 °C)-103.1 °F (39.5 °C)] 100.1 °F (37.8 °C)  Pulse:  [] 88  Resp:  [12-43] 12  SpO2:  [83 %-100 %] 100 %  BP: ()/(35-53) 79/36     Weight: 72.5 kg (159 lb 13.3 oz)  Body mass index is 22.29 kg/m².      Intake/Output Summary (Last 24 hours) at 4/10/2022 1234  Last data filed at 4/10/2022 0900  Gross per 24 hour   Intake 2410 ml   Output 1000 ml   Net 1410 ml       Physical Exam  Vitals reviewed.   Constitutional:       General: He is not in acute distress.     Comments: Chronically ill appearing   HENT:      Right Ear: External ear normal.      Left Ear: External ear normal.      Mouth/Throat:      Mouth: Mucous membranes are moist.   Eyes:      Pupils: Pupils are equal, round, and reactive to light.   Cardiovascular:      Rate and Rhythm: Normal rate and regular rhythm.   Pulmonary:      Breath sounds: No wheezing or rales.   Abdominal:      Palpations: Abdomen is soft.   Musculoskeletal:         General: Normal range of motion.      Cervical back: Normal range of motion and neck supple.   Skin:     General: Skin is warm and dry.      Capillary Refill: Capillary refill takes less than 2 seconds.   Neurological:      Mental Status: He is alert. Mental status is at baseline.   Psychiatric:         Mood and Affect: Mood normal.       Vents:  Vent Mode: A/C (12:15  placed back onACV. CATIE CPAP WELL X4HRS) (04/10/22 1227)  Ventilator Initiated: Yes (04/10/22 0343)  Set Rate: 14 BPM (04/10/22 1227)  Vt Set: 480 mL (04/10/22 1227)  Pressure Support: 10 cmH20 (04/10/22 0800)  PEEP/CPAP: 5 cmH20 (04/10/22 1227)  Oxygen Concentration (%): 35 (04/10/22 1227)  Peak Airway Pressure:  15 cmH2O (04/10/22 1227)  Plateau Pressure: 20 cmH20 (03/07/22 0500)  Total Ve: 6.7 mL (04/10/22 1227)  F/VT Ratio<105 (RSBI): (!) 37.15 (04/10/22 1227)    Lines/Drains/Airways       Central Venous Catheter Line  Duration                  Hemodialysis Catheter 12/30/21 0900 right internal jugular 101 days              Drain  Duration                  Colostomy 12/18/21 1030 Descending/sigmoid  days         Gastrostomy/Enterostomy 03/09/22 1436 Percutaneous endoscopic gastrostomy (PEG) midline feeding 31 days              Airway  Duration                  Surgical Airway 01/04/22 Shiley 96 days              Peripheral Intravenous Line  Duration                  Peripheral IV - Single Lumen 04/04/22 1430 22 G Posterior;Right Hand 5 days         Peripheral IV - Single Lumen 04/10/22 1046 22 G Left;Posterior Hand <1 day                    Significant Labs:    CBC/Anemia Profile:  No results for input(s): WBC, HGB, HCT, PLT, MCV, RDW, IRON, FERRITIN, RETIC, FOLATE, [id number], OCCULTBLOOD in the last 48 hours.     Chemistries:  No results for input(s): NA, K, CL, CO2, BUN, CREATININE, CALCIUM, ALBUMIN, PROT, BILITOT, ALKPHOS, ALT, AST, GLUCOSE, MG, PHOS in the last 48 hours.    All pertinent labs within the past 24 hours have been reviewed.    Significant Imaging:  I have reviewed all pertinent imaging results/findings within the past 24 hours.

## 2022-04-10 NOTE — ASSESSMENT & PLAN NOTE
2/28 - resp culture with pseudomonas and klebsiella  treat with zosyn for 7 days   Blood culture from 2/25 also growing Pseudomonas. Sensitive to zosyn. Will need to continue zosyn for at least 10 days total  - low grade fever since 3/28, CXR- unchanged and repeat CBC- WBC 20.57  - removed malpositioned LUE PICC.  - blood cultures- NGTD after 72  Hours   -4/6-  Temp 101 at noon yesterday- wound cultures with heavy growth gram neg bacilli - will go ahead and add Rocephin and continue to follow cultures    4/7- febrile this morning - added clinda given his aspiration event   4/8 - afebrile- would cultures reviewed - sensitive to Rocephin   4/9-  wound culture sensitivity has returned; escalating to Zosyn specially given his fever overnight.  4/10--- febrile - TMAx 103.1 - will add MRSA coverage - pan culture

## 2022-04-11 NOTE — ASSESSMENT & PLAN NOTE
2/28 - resp culture with pseudomonas and klebsiella  treat with zosyn for 7 days   Blood culture from 2/25 also growing Pseudomonas. Sensitive to zosyn. Will need to continue zosyn for at least 10 days total  - low grade fever since 3/28, CXR- unchanged and repeat CBC- WBC 20.57  - removed malpositioned LUE PICC.  - blood cultures- NGTD after 72  Hours   -4/6-  Temp 101 at noon yesterday- wound cultures with heavy growth gram neg bacilli - will go ahead and add Rocephin and continue to follow cultures    4/7- febrile this morning - added clinda given his aspiration event   4/8 - afebrile- would cultures reviewed - sensitive to Rocephin   4/9-  wound culture sensitivity has returned; escalating to Zosyn specially given his fever overnight.  4/10--- febrile - TMAx 103.1 - will add MRSA coverage - pan culture   4/11- Tmax 99.9 after adding MRSA coverage

## 2022-04-11 NOTE — PROGRESS NOTES
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 male with wounds to sacral, left lateral lower leg, right anterior foot, right hand, left posterior heel, and right lateral and medial foot. Sacral wound is worse, necrotic with moderate amount of green drainage and malodorous. Wounds on feet and heels continue to have necrotic tissue. Patient had fever over the weekend. Wound culture grew back multiple bacteria. He is starting on Vancomycin     Review of Systems   Unable to perform ROS: Acuity of condition     Objective:     Vital Signs (Most Recent):  Temp: 97.7 °F (36.5 °C) (04/11/22 1005)  Pulse: 92 (04/11/22 1045)  Resp: (!) 22 (04/11/22 1045)  BP: (!) 101/56 (04/11/22 1045)  SpO2: 100 % (04/11/22 1045) Vital Signs (24h Range):  Temp:  [97.7 °F (36.5 °C)-99.9 °F (37.7 °C)] 97.7 °F (36.5 °C)  Pulse:  [] 92  Resp:  [12-35] 22  SpO2:  [100 %] 100 %  BP: ()/(39-56) 101/56     Weight: 78.9 kg (173 lb 15.1 oz)  Body mass index is 24.26 kg/m².    Physical Exam  Vitals reviewed.   Constitutional:       Appearance: He is ill-appearing.   HENT:      Head: Normocephalic.   Cardiovascular:      Rate and Rhythm: Regular rhythm. Tachycardia present.      Pulses: Normal pulses.      Heart sounds: Normal heart sounds.   Pulmonary:      Comments: Intubated  Skin:     Findings: Erythema present.      Comments: Wound, see wound assessment and pictures   Neurological:      Mental Status: He is alert. Mental status is at baseline.      Motor: Weakness present.            Altered Skin Integrity 01/20/22 1000 Right  anterior Foot Purple or maroon localized area of discolored intact skin or non-intact skin or a blood-filled blister. (Active)   01/20/22 1000   Altered Skin Integrity Present on Admission: suspected hospital acquired   Side: Right   Orientation: anterior   Location: Foot   Wound Number:    Is this injury device related?:    Primary Wound Type:    Description of Altered Skin Integrity: Purple or maroon localized area of discolored intact skin or non-intact skin or a blood-filled blister.   Removal Indication and Assessment:    Wound Outcome:    (Retired) Wound Length (cm):    (Retired) Wound Width (cm):    (Retired) Depth (cm):    Wound Description (Comments):    Removal Indications:    Wound Image    04/05/22 1130   Description of Altered Skin Integrity Full thickness tissue loss. Base is covered by slough and/or eschar in the wound bed 04/05/22 1130   Dressing Appearance Dry;Intact 04/09/22 0701   Drainage Amount None 04/09/22 0701   Appearance Chung;Moist 04/07/22 1145   Tissue loss description Not applicable 04/07/22 1145   Periwound Area Dry 04/07/22 1145   Wound Edges Defined 04/07/22 1145   Wound Length (cm) 1 cm 04/05/22 1130   Wound Width (cm) 0.5 cm 04/05/22 1130   Wound Depth (cm) 0 cm 04/05/22 1130   Wound Volume (cm^3) 0 cm^3 04/05/22 1130   Wound Surface Area (cm^2) 0.5 cm^2 04/05/22 1130   Care Cleansed with: 04/10/22 1600   Dressing Changed 04/10/22 1600   Dressing Change Due 04/11/22 04/07/22 1145            Altered Skin Integrity 01/20/22 1000 Right lateral Foot Purple or maroon localized area of discolored intact skin or non-intact skin or a blood-filled blister. (Active)   01/20/22 1000   Altered Skin Integrity Present on Admission: suspected hospital acquired   Side: Right   Orientation: lateral   Location: Foot   Wound Number:    Is this injury device related?:    Primary Wound Type:    Description of Altered Skin Integrity: Purple or maroon localized area of discolored intact skin or non-intact  skin or a blood-filled blister.   Removal Indication and Assessment:    Wound Outcome:    (Retired) Wound Length (cm):    (Retired) Wound Width (cm):    (Retired) Depth (cm):    Wound Description (Comments):    Removal Indications:    Wound Image    04/05/22 1130   Description of Altered Skin Integrity Full thickness tissue loss. Base is covered by slough and/or eschar in the wound bed 04/05/22 1130   Dressing Appearance Intact 04/08/22 1913   Drainage Amount None 04/07/22 1145   Appearance Black;Dry 04/07/22 1145   Tissue loss description Not applicable 04/07/22 1145   Black (%), Wound Tissue Color 100 % 03/28/22 1145   Red (%), Wound Tissue Color 0 % 03/28/22 1145   Yellow (%), Wound Tissue Color 0 % 03/28/22 1145   Periwound Area Dry 04/07/22 1145   Wound Edges Defined 04/07/22 1145   Wound Length (cm) 3 cm 04/05/22 1130   Wound Width (cm) 4 cm 04/05/22 1130   Wound Depth (cm) 0 cm 04/05/22 1130   Wound Volume (cm^3) 0 cm^3 04/05/22 1130   Wound Surface Area (cm^2) 12 cm^2 04/05/22 1130   Care Cleansed with:;Antimicrobial agent 04/10/22 1600   Dressing Changed 04/10/22 1600   Periwound Care Skin barrier film applied 04/07/22 1145   Dressing Change Due 04/11/22 04/07/22 1145            Altered Skin Integrity 02/08/22 1300 Left posterior Heel Other (comment) Full thickness tissue loss. Base is covered by slough and/or eschar in the wound bed (Active)   02/08/22 1300   Altered Skin Integrity Present on Admission: yes   Side: Left   Orientation: posterior   Location: Heel   Wound Number:    Is this injury device related?: No   Primary Wound Type: Other   Description of Altered Skin Integrity: Full thickness tissue loss. Base is covered by slough and/or eschar in the wound bed   Removal Indication and Assessment:    Wound Outcome:    (Retired) Wound Length (cm):    (Retired) Wound Width (cm):    (Retired) Depth (cm):    Wound Description (Comments):    Removal Indications:    Wound Image    04/07/22 1130   Description  of Altered Skin Integrity Full thickness tissue loss. Base is covered by slough and/or eschar in the wound bed 04/07/22 1130   Dressing Appearance Dry;Intact 04/10/22 1930   Drainage Amount None 04/07/22 1145   Drainage Characteristics/Odor No odor 04/07/22 1145   Appearance Eschar;Dry 04/07/22 1145   Tissue loss description Not applicable 04/07/22 1145   Black (%), Wound Tissue Color 99 % 03/28/22 1145   Red (%), Wound Tissue Color 1 % 03/28/22 1145   Yellow (%), Wound Tissue Color 0 % 03/28/22 1145   Periwound Area Dry 04/07/22 1145   Wound Edges Defined 04/07/22 1145   Wound Length (cm) 4 cm 03/22/22 1300   Wound Width (cm) 6 cm 03/22/22 1300   Wound Depth (cm) 0 cm 03/22/22 1300   Wound Volume (cm^3) 0 cm^3 03/22/22 1300   Wound Surface Area (cm^2) 24 cm^2 03/22/22 1300   Care Cleansed with:;Antimicrobial agent;Wound cleanser 04/10/22 1600   Dressing Changed;Gauze 04/10/22 1600   Periwound Care Moisture barrier applied 04/04/22 1300   Dressing Change Due 04/11/22 04/07/22 1145            Altered Skin Integrity 02/15/22 1300 Left lateral Leg Traumatic Partial thickness tissue loss. Shallow open ulcer with a red or pink wound bed, without slough. Intact or Open/Ruptured Serum-filled blister. (Active)   02/15/22 1300   Altered Skin Integrity Present on Admission: suspected hospital acquired   Side: Left   Orientation: lateral   Location: Leg   Wound Number:    Is this injury device related?: No   Primary Wound Type: Traumatic   Description of Altered Skin Integrity: Partial thickness tissue loss. Shallow open ulcer with a red or pink wound bed, without slough. Intact or Open/Ruptured Serum-filled blister.   Removal Indication and Assessment:    Wound Outcome:    (Retired) Wound Length (cm):    (Retired) Wound Width (cm):    (Retired) Depth (cm):    Wound Description (Comments):    Removal Indications:    Wound Image    03/28/22 1145   Description of Altered Skin Integrity Purple or maroon localized area of  discolored intact skin or non-intact skin or a blood-filled blister. 03/28/22 1145   Dressing Appearance Intact 04/08/22 1913   Drainage Amount None 04/05/22 1130   Appearance Dry 04/07/22 1145   Tissue loss description Partial thickness 02/15/22 1300   Black (%), Wound Tissue Color 0 % 03/28/22 1145   Red (%), Wound Tissue Color 80 % 03/28/22 1145   Yellow (%), Wound Tissue Color 0 % 03/28/22 1145   Periwound Area Dry 04/05/22 1130   Wound Edges Defined 04/05/22 1130   Care Cleansed with:;Antimicrobial agent 04/10/22 1600   Dressing Removed;Changed 04/10/22 1600            Altered Skin Integrity 02/15/22 1300 Right anterior Foot Traumatic Partial thickness tissue loss. Shallow open ulcer with a red or pink wound bed, without slough. Intact or Open/Ruptured Serum-filled blister. (Active)   02/15/22 1300   Altered Skin Integrity Present on Admission: suspected hospital acquired   Side: Right   Orientation: anterior   Location: Foot   Wound Number:    Is this injury device related?: No   Primary Wound Type: Traumatic   Description of Altered Skin Integrity: Partial thickness tissue loss. Shallow open ulcer with a red or pink wound bed, without slough. Intact or Open/Ruptured Serum-filled blister.   Removal Indication and Assessment:    Wound Outcome:    (Retired) Wound Length (cm):    (Retired) Wound Width (cm):    (Retired) Depth (cm):    Wound Description (Comments):    Removal Indications:    Wound Image    04/05/22 1130   Description of Altered Skin Integrity Purple or maroon localized area of discolored intact skin or non-intact skin or a blood-filled blister. 03/15/22 1000   Dressing Appearance Intact 04/08/22 1913   Drainage Amount None 04/07/22 1145   Drainage Characteristics/Odor Serosanguineous 02/26/22 1600   Appearance Dressing in place, unable to visualize;Intact 04/05/22 1930   Tissue loss description Not applicable 04/07/22 1145   Black (%), Wound Tissue Color 99 % 03/15/22 1000   Red (%), Wound Tissue  Color 99 % 02/15/22 1300   Periwound Area Dry 04/07/22 1145   Wound Edges Defined 04/07/22 1145   Care Cleansed with:;Soap and water 04/07/22 1145   Dressing Removed;Changed;Applied 04/02/22 1150   Dressing Change Due 02/28/22 02/24/22 1330            Altered Skin Integrity 02/28/22 1300 Left anterior Foot Traumatic Partial thickness tissue loss. Shallow open ulcer with a red or pink wound bed, without slough. Intact or Open/Ruptured Serum-filled blister. (Active)   02/28/22 1300   Altered Skin Integrity Present on Admission: suspected hospital acquired   Side: Left   Orientation: anterior   Location: Foot   Wound Number:    Is this injury device related?: No   Primary Wound Type: Traumatic   Description of Altered Skin Integrity: Partial thickness tissue loss. Shallow open ulcer with a red or pink wound bed, without slough. Intact or Open/Ruptured Serum-filled blister.   Removal Indication and Assessment:    Wound Outcome:    (Retired) Wound Length (cm):    (Retired) Wound Width (cm):    (Retired) Depth (cm):    Wound Description (Comments):    Removal Indications:    Wound Image    04/05/22 1130   Description of Altered Skin Integrity Full thickness tissue loss. Base is covered by slough and/or eschar in the wound bed 04/05/22 1130   Dressing Appearance Intact 04/08/22 0000   Drainage Amount None 04/07/22 1130   Drainage Characteristics/Odor No odor 04/07/22 1145   Appearance Black;Dry 04/07/22 1145   Tissue loss description Not applicable 04/07/22 1145   Black (%), Wound Tissue Color 100 % 04/05/22 1130   Red (%), Wound Tissue Color 0 % 04/05/22 1130   Yellow (%), Wound Tissue Color 0 % 04/05/22 1130   Periwound Area Dry 04/07/22 1145   Wound Edges Defined 04/07/22 1145   Care Cleansed with:;Soap and water 04/07/22 1145   Dressing Removed;Applied;Changed;Hydrocolloid 04/07/22 1130   Dressing Change Due 04/11/22 04/07/22 1130            Altered Skin Integrity 03/07/22 1300 Left anterior Greater trochanter Moisture  associated dermatitis (Active)   03/07/22 1300   Altered Skin Integrity Present on Admission:    Side: Left   Orientation: anterior   Location: Greater trochanter   Wound Number:    Is this injury device related?: No   Primary Wound Type: Moisture ass   Description of Altered Skin Integrity:    Removal Indication and Assessment:    Wound Outcome:    (Retired) Wound Length (cm):    (Retired) Wound Width (cm):    (Retired) Depth (cm):    Wound Description (Comments):    Removal Indications:    Wound Image    03/17/22 1300   Dressing Appearance Open to air;Dry 04/07/22 1145   Drainage Amount None 04/07/22 1145   Appearance Intact;Dry 04/07/22 1145   Tissue loss description Not applicable 04/07/22 1145   Periwound Area Dry;Intact 04/07/22 1145   Wound Edges Defined 04/07/22 1145   Care Cleansed with:;Soap and water 04/07/22 1145            Altered Skin Integrity 03/07/22 1300 Right anterior Toe, fourth Ulceration Partial thickness tissue loss. Shallow open ulcer with a red or pink wound bed, without slough. Intact or Open/Ruptured Serum-filled blister. (Active)   03/07/22 1300   Altered Skin Integrity Present on Admission: yes   Side: Right   Orientation: anterior   Location: Toe, fourth   Wound Number:    Is this injury device related?: No   Primary Wound Type: Ulceration   Description of Altered Skin Integrity: Partial thickness tissue loss. Shallow open ulcer with a red or pink wound bed, without slough. Intact or Open/Ruptured Serum-filled blister.   Removal Indication and Assessment:    Wound Outcome:    (Retired) Wound Length (cm):    (Retired) Wound Width (cm):    (Retired) Depth (cm):    Wound Description (Comments):    Removal Indications:    Wound Image    04/05/22 1130   Description of Altered Skin Integrity Full thickness tissue loss. Base is covered by slough and/or eschar in the wound bed 04/05/22 1130   Dressing Appearance Dry 04/07/22 1145   Drainage Amount None 04/07/22 1145   Drainage  Characteristics/Odor No odor 04/07/22 1145   Appearance Black;Dry 04/07/22 1145   Tissue loss description Not applicable 04/07/22 1145   Black (%), Wound Tissue Color 100 % 04/05/22 1130   Red (%), Wound Tissue Color 0 % 04/05/22 1130   Yellow (%), Wound Tissue Color 0 % 04/05/22 1130   Periwound Area Dry 04/07/22 1145   Wound Edges Defined 04/07/22 1145   Care Cleansed with:;Soap and water 04/07/22 1145   Dressing Removed;Applied;Changed;Hydrocolloid 04/07/22 1145   Dressing Change Due 04/11/22 04/07/22 1145            Altered Skin Integrity 03/15/22 1000 Right lateral Malleolus Ulceration Purple or maroon localized area of discolored intact skin or non-intact skin or a blood-filled blister. (Active)   03/15/22 1000   Altered Skin Integrity Present on Admission: suspected hospital acquired   Side: Right   Orientation: lateral   Location: Malleolus   Wound Number:    Is this injury device related?: Yes   Primary Wound Type: Ulceration   Description of Altered Skin Integrity: Purple or maroon localized area of discolored intact skin or non-intact skin or a blood-filled blister.   Removal Indication and Assessment:    Wound Outcome:    (Retired) Wound Length (cm):    (Retired) Wound Width (cm):    (Retired) Depth (cm):    Wound Description (Comments):    Removal Indications:    Wound Image    04/05/22 1130   Description of Altered Skin Integrity Full thickness tissue loss. Base is covered by slough and/or eschar in the wound bed 04/05/22 1130   Dressing Appearance Intact 04/08/22 0000   Drainage Amount Scant 04/07/22 1145   Drainage Characteristics/Odor No odor 04/07/22 1145   Appearance Black;Dry 04/07/22 1145   Tissue loss description Not applicable 04/07/22 1145   Black (%), Wound Tissue Color 100 % 04/05/22 1130   Red (%), Wound Tissue Color 0 % 04/05/22 1130   Yellow (%), Wound Tissue Color 0 % 04/05/22 1130   Periwound Area Dry 04/07/22 1145   Wound Edges Defined;Open 04/07/22 1145   Wound Length (cm) 3 cm  04/05/22 1130   Wound Width (cm) 3.5 cm 04/05/22 1130   Wound Depth (cm) 0 cm 04/05/22 1130   Wound Volume (cm^3) 0 cm^3 04/05/22 1130   Wound Surface Area (cm^2) 10.5 cm^2 04/05/22 1130   Care Cleansed with:;Soap and water;Wound cleanser;Applied:;Removed:;Skin Barrier;Moisturizing agent 04/07/22 1145   Dressing Removed;Applied;Changed;Gauze, wet to moist 04/07/22 1145   Periwound Care Skin barrier film applied 04/07/22 1145   Dressing Change Due 04/11/22 04/07/22 1145            Altered Skin Integrity 03/15/22 1000 Right lateral Leg Ulceration Purple or maroon localized area of discolored intact skin or non-intact skin or a blood-filled blister. (Active)   03/15/22 1000   Altered Skin Integrity Present on Admission: suspected hospital acquired   Side: Right   Orientation: lateral   Location: Leg   Wound Number:    Is this injury device related?: No   Primary Wound Type: Ulceration   Description of Altered Skin Integrity: Purple or maroon localized area of discolored intact skin or non-intact skin or a blood-filled blister.   Removal Indication and Assessment:    Wound Outcome:    (Retired) Wound Length (cm):    (Retired) Wound Width (cm):    (Retired) Depth (cm):    Wound Description (Comments):    Removal Indications:    Wound Image    04/05/22 1130   Description of Altered Skin Integrity Full thickness tissue loss. Base is covered by slough and/or eschar in the wound bed 04/05/22 1130   Dressing Appearance Moist drainage 04/07/22 1145   Drainage Amount Scant 04/07/22 1145   Drainage Characteristics/Odor Serous;Tan;Other (see comments) 04/07/22 1145   Appearance Black;Tan;Purple;Moist 04/07/22 1145   Tissue loss description Not applicable 04/07/22 1145   Black (%), Wound Tissue Color 99 % 04/05/22 1130   Red (%), Wound Tissue Color 1 % 04/05/22 1130   Yellow (%), Wound Tissue Color 0 % 04/05/22 1130   Periwound Area Dry 04/07/22 1145   Wound Edges Defined;Open 04/07/22 1145   Wound Length (cm) 6 cm 04/05/22 1139    Wound Width (cm) 3 cm 04/05/22 1130   Wound Depth (cm) 0 cm 04/05/22 1130   Wound Volume (cm^3) 0 cm^3 04/05/22 1130   Wound Surface Area (cm^2) 18 cm^2 04/05/22 1130   Care Cleansed with:;Sterile normal saline;Applied:;Removed: 04/07/22 1145   Dressing Removed;Applied;Changed;Hydrocolloid 04/07/22 1145   Periwound Care Skin barrier film applied 04/07/22 1145   Dressing Change Due 04/11/22 04/07/22 1145            Altered Skin Integrity 03/22/22 1300 posterior Sacral spine Other (comment) Full thickness tissue loss. Base is covered by slough and/or eschar in the wound bed (Active)   03/22/22 1300   Altered Skin Integrity Present on Admission: yes   Side:    Orientation: posterior   Location: Sacral spine   Wound Number:    Is this injury device related?: No   Primary Wound Type: Other   Description of Altered Skin Integrity: Full thickness tissue loss. Base is covered by slough and/or eschar in the wound bed   Removal Indication and Assessment:    Wound Outcome:    (Retired) Wound Length (cm):    (Retired) Wound Width (cm):    (Retired) Depth (cm):    Wound Description (Comments):    Removal Indications:    Wound Image    04/07/22 1145   Description of Altered Skin Integrity Full thickness tissue loss with exposed bone, tendon, or muscle. Often includes undermining and tunneling. May extend into muscle and/or supporting structures. 04/05/22 1130   Dressing Appearance Moist drainage 04/07/22 1145   Drainage Amount Moderate 04/07/22 1145   Drainage Characteristics/Odor Serous;Tan;No odor 04/07/22 1145   Appearance Pink;Gray;Tan;Moist;Slough 04/07/22 1145   Tissue loss description Full thickness 04/07/22 1145   Black (%), Wound Tissue Color 0 % 03/28/22 1145   Red (%), Wound Tissue Color 60 % 04/05/22 1130   Yellow (%), Wound Tissue Color 60 % 04/05/22 1130   Periwound Area Macerated;Moist 04/07/22 1145   Wound Edges Defined;Open 04/07/22 1145   Wound Length (cm) 13 cm 03/22/22 1300   Wound Width (cm) 11 cm 03/22/22  1300   Wound Depth (cm) 3 cm 03/22/22 1300   Wound Volume (cm^3) 429 cm^3 03/22/22 1300   Wound Surface Area (cm^2) 143 cm^2 03/22/22 1300   Care Cleansed with:;Soap and water;Wound cleanser;Applied:;Removed:;Skin Barrier;Moisturizing agent 04/07/22 1145   Dressing Removed;Applied;Changed;Gauze, wet to moist;Gauze 04/07/22 1145   Dressing Change Due 04/11/22 04/07/22 1145            Altered Skin Integrity 04/05/22 1130 Right anterior Toe, second Traumatic (Active)   04/05/22 1130   Altered Skin Integrity Present on Admission:    Side: Right   Orientation: anterior   Location: Toe, second   Wound Number:    Is this injury device related?: No   Primary Wound Type: Traumatic   Description of Altered Skin Integrity:    Removal Indication and Assessment:    Wound Outcome:    (Retired) Wound Length (cm):    (Retired) Wound Width (cm):    (Retired) Depth (cm):    Wound Description (Comments):    Removal Indications:    Wound Image    04/05/22 1130   Dressing Appearance Dry 04/07/22 1145   Drainage Amount None 04/07/22 1145   Appearance Black 04/07/22 1145   Tissue loss description Not applicable 04/07/22 1145   Wound Edges Defined;Open 04/07/22 1145   Care Cleansed with:;Soap and water 04/07/22 1130            Altered Skin Integrity 04/07/22 1130 posterior Buttocks Incontinence associated dermatitis (Active)   04/07/22 1130   Altered Skin Integrity Present on Admission: suspected hospital acquired   Side:    Orientation: posterior   Location: Buttocks   Wound Number:    Is this injury device related?: No   Primary Wound Type: Incontinence   Description of Altered Skin Integrity:    Removal Indication and Assessment:    Wound Outcome:    (Retired) Wound Length (cm):    (Retired) Wound Width (cm):    (Retired) Depth (cm):    Wound Description (Comments):    Removal Indications:    Wound Image    04/07/22 1130   Dressing Appearance Moist drainage 04/07/22 1130   Drainage Amount Scant 04/07/22 1130   Drainage  Characteristics/Odor Serosanguineous 04/07/22 1130   Appearance Pink;Moist 04/07/22 1130   Tissue loss description Partial thickness 04/07/22 1130   Periwound Area Denuded;Excoriated 04/07/22 1130   Wound Edges Undefined 04/07/22 1130   Care Cleansed with:;Soap and water;Wound cleanser;Applied:;Removed:;Skin Barrier;Moisturizing agent 04/07/22 1130   Dressing Changed 04/11/22 0425            Incision/Site 01/04/22 1444 Neck (Active)   01/04/22 1444   Present Prior to Hospital Arrival?:    Side:    Location: Neck   Orientation:    Incision Type:    Closure Method:    Additional Comments:    Removal Indication and Assessment:    Wound Outcome:    Removal Indications:    Incision WDL WDL 03/1955   Dressing Appearance Open to air 03/1955   Drainage Amount None 03/1955            Incision/Site 02/21/22 1318 Right Leg (Active)   02/21/22 1318   Present Prior to Hospital Arrival?:    Side: Right   Location: Leg   Orientation:    Incision Type:    Closure Method:    Additional Comments:    Removal Indication and Assessment:    Wound Outcome:    Removal Indications:    Wound Image    04/05/22 1130   Incision WDL WDL 04/07/22 1145   Dressing Appearance Open to air 04/09/22 1101   Drainage Amount None 04/09/22 0701   Drainage Characteristics/Odor Serosanguineous;No odor 03/22/22 1300   Appearance Pink;Dry 04/07/22 1145   Black (%), Wound Tissue Color 0 % 04/05/22 1130   Red (%), Wound Tissue Color 100 % 04/05/22 1130   Yellow (%), Wound Tissue Color 0 % 04/05/22 1130   Periwound Area Dry 04/07/22 1145   Wound Edges Defined 04/07/22 1145   Care Cleansed with:;Soap and water 04/07/22 1145   Dressing Gauze;Other (comment) 03/08/22 1300   Dressing Change Due 02/28/22 02/24/22 1540            Incision/Site 03/09/22 1444 Abdomen (Active)   03/09/22 1444   Present Prior to Hospital Arrival?:    Side:    Location: Abdomen   Orientation:    Incision Type:    Closure Method:    Additional Comments:    Removal  Indication and Assessment:    Wound Outcome:    Removal Indications:    Wound Image    04/07/22 1415   Incision WDL WDL 04/07/22 1145   Dressing Appearance Open to air 04/09/22 1101   Drainage Amount None 04/09/22 0701   Drainage Characteristics/Odor Serosanguineous;No odor 03/22/22 1300   Appearance Dry;Intact 04/07/22 1145   Black (%), Wound Tissue Color 0 % 03/28/22 1145   Red (%), Wound Tissue Color 99 % 03/28/22 1145   Yellow (%), Wound Tissue Color 0 % 03/28/22 1145   Periwound Area Dry 04/07/22 1145   Wound Edges Defined 04/07/22 1145   Care Cleansed with:;Soap and water 04/07/22 1145   Dressing Removed;Applied;Changed;Non-adherent;Other (comment) 03/22/22 1300            Incision/Site 03/25/22 1008 Buttocks (Active)   03/25/22 1008   Present Prior to Hospital Arrival?:    Side:    Location: Buttocks   Orientation:    Incision Type:    Closure Method:    Additional Comments:    Removal Indication and Assessment:    Wound Outcome:    Removal Indications:    Incision WDL ex 04/09/22 0701   Dressing Appearance Dry;Intact 04/11/22 0000   Drainage Amount None 04/09/22 0701   Appearance Dressing in place, unable to visualize;Intact 04/05/22 2330   Care Cleansed with:;Antimicrobial agent;Wound cleanser 04/10/22 1600   Dressing Changed;Gauze, wet to dry 04/10/22 1600   Packing packed with;strip gauze 04/10/22 1600   Dressing Change Due 04/11/22 04/10/22 1600       [REMOVED]      Negative Pressure Wound Therapy  12/14/21 midline;lower (Removed)   12/14/21    Side:    Orientation: midline;lower   Location: Lower quadrant   Additional Comments:    Removal Indication and Assessment: removed by previous caregiver   Location:    SDO Location:    Removed 02/16/22 0800   NPWT Type Vacuum Therapy 02/15/22 0600   Therapy Setting NPWT Vacuum off 02/15/22 1300   Pressure Setting NPWT 125 mmHg 02/15/22 0600   Therapy Interventions NPWT Seal intact 02/15/22 0600   Sponges Inserted NPWT Black 02/15/22 0600   Sponges Removed NPWT  Black;White;1 02/10/22 1300   General Output (mL) 300 02/10/22 0930       [REMOVED]      Negative Pressure Wound Therapy  02/21/22 Right lower (Removed)   02/21/22    Side: Right   Orientation: lower   Location: Lower quadrant   Additional Comments:    Removal Indication and Assessment: No Longer Indicated   Location:    SDO Location:    Removed 02/22/22    NPWT Type Vacuum Therapy 02/22/22 0400   Therapy Setting NPWT Continuous therapy 02/22/22 0400   Pressure Setting NPWT other (see comments) 02/22/22 0400   Therapy Interventions NPWT Seal intact 02/22/22 0400   General Output (mL) 0 02/21/22 2340       [REMOVED]      Altered Skin Integrity Left lateral Thigh Purple or maroon localized area of discolored intact skin or non-intact skin or a blood-filled blister. (Removed)       Altered Skin Integrity Present on Admission:    Side: Left   Orientation: lateral   Location: Thigh   Wound Number:    Is this injury device related?:    Primary Wound Type:    Description of Altered Skin Integrity: Purple or maroon localized area of discolored intact skin or non-intact skin or a blood-filled blister.   Removal Indication and Assessment:    Wound Outcome: Healed   (Retired) Wound Length (cm):    (Retired) Wound Width (cm):    (Retired) Depth (cm):    Wound Description (Comments):    Removal Indications:    Removed 03/15/22 1000   Wound Image    03/15/22 1000   Description of Altered Skin Integrity Purple or maroon localized area of discolored intact skin or non-intact skin or a blood-filled blister. 02/13/22 0730   Dressing Appearance Open to air;Dry;Intact 03/15/22 1000   Drainage Amount None 03/15/22 1000   Appearance Dry 03/15/22 1000   Care Cleansed with:;Soap and water 12/28/21 1521       [REMOVED]      Altered Skin Integrity 01/27/22 1045 Right dorsal Hand Other (comment) Full thickness tissue loss. Base is covered by slough and/or eschar in the wound bed (Removed)   01/27/22 1045   Altered Skin Integrity Present on  Admission: suspected hospital acquired   Side: Right   Orientation: dorsal   Location: Hand   Wound Number:    Is this injury device related?: No   Primary Wound Type: Other   Description of Altered Skin Integrity: Full thickness tissue loss. Base is covered by slough and/or eschar in the wound bed   Removal Indication and Assessment:    Wound Outcome:    (Retired) Wound Length (cm):    (Retired) Wound Width (cm):    (Retired) Depth (cm):    Wound Description (Comments):    Removal Indications:    Removed 04/05/22 1130   Wound Image    04/05/22 1130   Description of Altered Skin Integrity Partial thickness tissue loss. Shallow open ulcer with a red or pink wound bed, without slough. Intact or Open/Ruptured Serum-filled blister. 02/28/22 1300   Dressing Appearance Open to air;Dry;Intact 03/21/22 1300   Drainage Amount None 03/21/22 1300   Drainage Characteristics/Odor Serosanguineous;No odor 03/01/22 1330   Appearance Pink;Dry 04/05/22 1130   Tissue loss description Not applicable 04/05/22 1130   Black (%), Wound Tissue Color 99 % 02/07/22 1300   Red (%), Wound Tissue Color 95 % 02/24/22 1330   Yellow (%), Wound Tissue Color 5 % 02/24/22 1330   Periwound Area Dry;Intact 03/21/22 1300   Wound Edges Defined 03/21/22 1300   Wound Length (cm) 3.5 cm 02/07/22 1300   Wound Width (cm) 2 cm 02/07/22 1300   Wound Depth (cm) 0 cm 02/07/22 1300   Wound Volume (cm^3) 0 cm^3 02/07/22 1300   Wound Surface Area (cm^2) 7 cm^2 02/07/22 1300   Care Cleansed with:;Soap and water 03/21/22 1300   Dressing Applied;Other (comment) 03/02/22 1300   Dressing Change Due 03/02/22 03/01/22 1330       [REMOVED]      Altered Skin Integrity 02/08/22 1300 Right lateral Malleolus Traumatic Partial thickness tissue loss. Shallow open ulcer with a red or pink wound bed, without slough. Intact or Open/Ruptured Serum-filled blister. (Removed)   02/08/22 1300   Altered Skin Integrity Present on Admission: suspected hospital acquired   Side: Right    Orientation: lateral   Location: Malleolus   Wound Number:    Is this injury device related?: No   Primary Wound Type: Traumatic   Description of Altered Skin Integrity: Partial thickness tissue loss. Shallow open ulcer with a red or pink wound bed, without slough. Intact or Open/Ruptured Serum-filled blister.   Removal Indication and Assessment: site change   Wound Outcome:    (Retired) Wound Length (cm):    (Retired) Wound Width (cm):    (Retired) Depth (cm):    Wound Description (Comments):    Removal Indications:    Removed 03/15/22 1000   Wound Image    03/07/22 1300   Description of Altered Skin Integrity Partial thickness tissue loss. Shallow open ulcer with a red or pink wound bed, without slough. Intact or Open/Ruptured Serum-filled blister. 03/07/22 1300   Dressing Appearance Dry 03/14/22 0945   Drainage Amount None 03/12/22 1621   Drainage Characteristics/Odor Brown;No odor 03/09/22 1250   Appearance Dressing in place, unable to visualize;Intact 03/13/22 2000   Tissue loss description Not applicable 03/09/22 1250   Periwound Area Dry 03/14/22 0945   Wound Edges Defined 03/14/22 0945   Care Cleansed with:;Soap and water 03/16/22 1100   Dressing Applied;Silver 03/16/22 1100   Dressing Change Due 03/11/22 03/10/22 1100       [REMOVED]      Altered Skin Integrity 02/08/22 1300 Right posterior Achilles Traumatic Purple or maroon localized area of discolored intact skin or non-intact skin or a blood-filled blister. (Removed)   02/08/22 1300   Altered Skin Integrity Present on Admission: suspected hospital acquired   Side: Right   Orientation: posterior   Location: Achilles   Wound Number:    Is this injury device related?: No   Primary Wound Type: Traumatic   Description of Altered Skin Integrity: Purple or maroon localized area of discolored intact skin or non-intact skin or a blood-filled blister.   Removal Indication and Assessment:    Wound Outcome: Healed   (Retired) Wound Length (cm):    (Retired) Wound  Width (cm):    (Retired) Depth (cm):    Wound Description (Comments):    Removal Indications:    Removed 03/15/22 1000   Wound Image    03/15/22 1000   Description of Altered Skin Integrity Purple or maroon localized area of discolored intact skin or non-intact skin or a blood-filled blister. 03/07/22 1300   Dressing Appearance Dry;Open to air;Intact 03/15/22 1000   Drainage Amount None 03/15/22 1000   Appearance Dressing in place, unable to visualize;Intact 03/13/22 2000   Tissue loss description Not applicable 03/15/22 1000   Wound Edges Defined 03/15/22 1000   Care Cleansed with:;Soap and water 03/15/22 1000   Dressing Changed 02/21/22 2340       [REMOVED]      Wound 12/27/21 1100 Traumatic Left anterior Knee (Removed)   12/27/21 1100    Pre-existing: Yes   Primary Wound Type: Traumatic   Side: Left   Orientation: anterior   Location: Knee   Wound Number:    Ankle-Brachial Index:    Pulses:    Removal Indication and Assessment:    Wound Outcome: Healed   (Retired) Wound Type:    (Retired) Wound Length (cm):    (Retired) Wound Width (cm):    (Retired) Depth (cm):    Wound Description (Comments):    Removal Indications:    Removed 02/28/22 1300   Wound Image    02/24/22 1330   Wound WDL WDL 02/28/22 1300   Dressing Appearance Open to air;Dry;Intact 02/28/22 1300   Drainage Amount None 02/28/22 1300   Appearance Intact 02/28/22 1300   Care Cleansed with:;Soap and water 02/07/22 1300       [REMOVED]      Incision/Site 12/13/21 2317 Perineum (Removed)   12/13/21 2317   Present Prior to Hospital Arrival?:    Side:    Location: Perineum   Orientation:    Incision Type:    Closure Method: Other (see comments)   Additional Comments:    Removal Indication and Assessment: site change   Wound Outcome:    Removal Indications:    Removed 03/22/22 1300   Wound Image    03/15/22 1000   Incision WDL WDL 03/21/22 1300   Dressing Appearance Moist drainage 03/21/22 1300   Drainage Amount Small 03/21/22 1300   Drainage  Characteristics/Odor Serosanguineous;No odor 03/21/22 1300   Appearance Dressing in place, unable to visualize;Intact 03/23/22 0000   Black (%), Wound Tissue Color 0 % 03/15/22 1000   Red (%), Wound Tissue Color 99 % 03/15/22 1000   Yellow (%), Wound Tissue Color 0 % 03/15/22 1000   Periwound Area Moist 03/21/22 1300   Wound Edges Defined;Open 03/21/22 1300   Wound Length (cm) 7.5 cm 03/15/22 1000   Wound Width (cm) 11 cm 03/15/22 1000   Wound Depth (cm) 2 cm 03/15/22 1000   Wound Volume (cm^3) 165 cm^3 03/15/22 1000   Wound Surface Area (cm^2) 82.5 cm^2 03/15/22 1000   Care Cleansed with:;Soap and water;Wound cleanser;Applied:;Removed:;Skin Barrier;Moisturizing agent 03/21/22 1300   Dressing Removed;Applied;Changed;Gauze, wet to moist;Gauze;Other (comment) 03/21/22 1300   Packing packing removed 03/20/22 1550   Packing Inserted  1 01/23/22 1800   Packing Removed 1 02/05/22 0730   Periwound Care Moisture barrier applied;Skin barrier film applied 03/21/22 1300   Dressing Change Due 03/22/22 03/21/22 1300       [REMOVED]      Incision/Site 12/27/21 1100 Right Buttocks (Removed)   12/27/21 1100   Present Prior to Hospital Arrival?:    Side: Right   Location: Buttocks   Orientation:    Incision Type:    Closure Method:    Additional Comments:    Removal Indication and Assessment:    Wound Outcome:    Removal Indications:    Removed 12/27/21 1100   Incision WDL WDL 12/26/21 0730   Dressing Appearance Dry;Intact;Dried drainage 12/26/21 0730   Drainage Amount Scant 12/26/21 0730   Drainage Characteristics/Odor Brown 12/26/21 0730   Appearance Dressing in place, unable to visualize 12/27/21 2000   Care Cleansed with:;Antimicrobial agent;Soap and water;Applied:;Other (see comments) 12/26/21 0730   Dressing Removed;Changed;Gauze, wet to dry 12/26/21 0730   Dressing Change Due 12/27/21 12/26/21 1530       [REMOVED]      Incision/Site 12/14/21 1238 Abdomen (Removed)   12/14/21 1238   Present Prior to Hospital Arrival?:    Side:     Location: Abdomen   Orientation:    Incision Type:    Closure Method:    Additional Comments:    Removal Indication and Assessment: site change   Wound Outcome:    Removal Indications:    Removed 02/07/22 1330   Wound Image    12/27/21 1100   Incision WDL WDL 01/27/22 1045   Dressing Appearance Dry;Intact 01/22/22 0010   Drainage Amount Moderate 01/18/22 0730   Drainage Characteristics/Odor Brown 01/09/22 0715   Appearance Chung;Moist 12/29/21 1100   Periwound Area Moist 12/29/21 1100   Wound Edges Defined;Open 12/29/21 1100   Care Cleansed with:;Sterile normal saline;Applied:;Removed:;Skin Barrier 12/28/21 1330   Dressing Removed;Changed;Gauze, wet to dry 01/01/22 0550   Packing packing removed;packed with;gauze, iodoform 01/01/22 0550   Dressing Change Due 01/02/22 01/01/22 0550       [REMOVED]      Incision/Site 01/01/22 0936 Abdomen (Removed)   01/01/22 0936   Present Prior to Hospital Arrival?:    Side:    Location: Abdomen   Orientation:    Incision Type:    Closure Method:    Additional Comments:    Removal Indication and Assessment: site change   Wound Outcome:    Removal Indications:    Removed 02/07/22 1330   Incision WDL ex 01/23/22 0730   Dressing Appearance Intact;Clean 01/23/22 2010   Drainage Amount Moderate 01/23/22 0730   Drainage Characteristics/Odor Sanguineous 01/23/22 0730   Appearance Dressing in place, unable to visualize 01/23/22 0730   Dressing Foam 01/19/22 0730       [REMOVED]      Incision/Site 01/01/22 0956 Right Groin (Removed)   01/01/22 0956   Present Prior to Hospital Arrival?:    Side: Right   Location: Groin   Orientation:    Incision Type:    Closure Method:    Additional Comments:    Removal Indication and Assessment: site change   Wound Outcome:    Removal Indications:    Removed 02/07/22 1300   Incision WDL WDL 01/22/22 0745   Dressing Appearance Open to air 01/19/22 0730   Drainage Amount None 01/19/22 0730   Care Cleansed with:;Soap and water 01/19/22 0730   Dressing Applied  01/14/22 0800       [REMOVED]      Incision/Site 01/04/22 1444 Abdomen (Removed)   01/04/22 1444   Present Prior to Hospital Arrival?:    Side:    Location: Abdomen   Orientation:    Incision Type:    Closure Method:    Additional Comments:    Removal Indication and Assessment: site change   Wound Outcome:    Removal Indications:    Removed 02/07/22 1300   Incision WDL ex 01/19/22 0730   Dressing Appearance Dry;Intact 01/21/22 0730   Drainage Amount Moderate 01/21/22 0730   Drainage Characteristics/Odor Brown;Serosanguineous 01/21/22 0730   Appearance Intact 01/19/22 0730   Dressing Foam 01/19/22 0730       [REMOVED]      Incision/Site 01/04/22 1444 Buttocks (Removed)   01/04/22 1444   Present Prior to Hospital Arrival?:    Side:    Location: Buttocks   Orientation:    Incision Type:    Closure Method:    Additional Comments:    Removal Indication and Assessment: site change   Wound Outcome:    Removal Indications:    Removed 03/22/22 1300   Wound Image    03/15/22 1000   Incision WDL WDL 03/21/22 1300   Dressing Appearance Moist drainage 03/21/22 1300   Drainage Amount Small 03/21/22 1300   Drainage Characteristics/Odor Serosanguineous;Other (see comments) 03/21/22 1300   Appearance Dressing in place, unable to visualize;Intact 03/23/22 0000   Black (%), Wound Tissue Color 80 % 03/15/22 1000   Red (%), Wound Tissue Color 20 % 03/15/22 1000   Yellow (%), Wound Tissue Color 0 % 03/15/22 1000   Periwound Area Moist 03/21/22 1300   Wound Edges Defined;Open 03/21/22 1300   Wound Length (cm) 10 cm 03/15/22 1000   Wound Width (cm) 6 cm 03/15/22 1000   Wound Depth (cm) 0 cm 03/15/22 1000   Wound Volume (cm^3) 0 cm^3 03/15/22 1000   Wound Surface Area (cm^2) 60 cm^2 03/15/22 1000   Care Cleansed with:;Soap and water;Wound cleanser;Applied:;Removed:;Skin Barrier;Moisturizing agent 03/21/22 1300   Dressing Removed;Applied;Changed;Gauze, wet to moist;Gauze;Other (comment) 03/21/22 1300   Packing packing removed 03/13/22 1300    Periwound Care Moisture barrier applied;Skin barrier film applied 03/21/22 1300   Dressing Change Due 03/22/22 03/21/22 1300       [REMOVED]      Incision/Site 01/07/22 1106 Abdomen (Removed)   01/07/22 1106   Present Prior to Hospital Arrival?:    Side:    Location: Abdomen   Orientation:    Incision Type:    Closure Method:    Additional Comments:    Removal Indication and Assessment: site change   Wound Outcome:    Removal Indications:    Removed 02/07/22 1300   Dressing Appearance Dry;Intact 02/05/22 0730   Drainage Amount None 02/05/22 0730   Drainage Characteristics/Odor Brown;Sanguineous 01/19/22 0730   Dressing Other (comment) 01/10/22 0720       [REMOVED]      Incision/Site 01/10/22 1518 Abdomen (Removed)   01/10/22 1518   Present Prior to Hospital Arrival?:    Side:    Location: Abdomen   Orientation:    Incision Type:    Closure Method:    Additional Comments:    Removal Indication and Assessment:    Wound Outcome: Healed   Removal Indications:    Removed 03/15/22 1000   Wound Image     03/15/22 1000   Incision WDL WDL 03/15/22 1000   Dressing Appearance Moist drainage 03/15/22 1000   Drainage Amount Scant 03/15/22 1000   Drainage Characteristics/Odor Serosanguineous;No odor 03/15/22 1000   Appearance Pink;Hypergranulation;Moist;Dry 03/15/22 1000   Red (%), Wound Tissue Color 30 % 03/15/22 1000   Periwound Area Dry;Moist 03/15/22 1000   Wound Edges Defined 03/15/22 1000   Care Cleansed with:;Soap and water;Applied:;Removed:;Skin Barrier;Moisturizing agent 03/15/22 1000   Dressing Removed;Applied;Changed;Gauze;Non-adherent;Other (comment) 03/15/22 1000   Dressing Change Due 03/18/22 03/15/22 1000       [REMOVED]      Incision/Site 01/12/22 1001 Abdomen (Removed)   01/12/22 1001   Present Prior to Hospital Arrival?:    Side:    Location: Abdomen   Orientation:    Incision Type:    Closure Method:    Additional Comments:    Removal Indication and Assessment: site change   Wound Outcome:    Removal  Indications:    Removed 02/07/22 1300   Dressing Appearance Intact;Dry;Clean 02/09/22 0730   Drainage Amount None 02/09/22 0730   Care Cleansed with:;Soap and water 01/14/22 1000   Dressing Applied;Other (comment) 01/14/22 1000       [REMOVED]      Incision/Site 02/21/22 1318 Abdomen (Removed)   02/21/22 1318   Present Prior to Hospital Arrival?:    Side:    Location: Abdomen   Orientation:    Incision Type:    Closure Method:    Additional Comments:    Removal Indication and Assessment: site change   Wound Outcome:    Removal Indications:    Removed 02/23/22 1330   Dressing Appearance Dry;Intact 02/23/22 0000   Appearance Dressing in place, unable to visualize 02/23/22 0000       Assessment and Plan      Assessment:  1. Sacral wound  2.  Lower extremity wounds     Plan:   1. Apply Duoderm to BLE  2.. Pack sacral wound with Vashe twice daily  3. Turn every 2 hours,   4. Keep pressure off sacral and heels.   5. Consult General surgery to evaluate for debridement.           Signature:  HERSON Viveros  Wound Care    Date of encounter: 04/11/2022

## 2022-04-11 NOTE — SUBJECTIVE & OBJECTIVE
Interval History:  He remains on ventilator.  He appears comfortable.    Review of patient's allergies indicates:  No Known Allergies  Current Facility-Administered Medications   Medication Frequency    0.9%  NaCl infusion (for blood administration) Q24H PRN    0.9%  NaCl infusion PRN    acetaminophen tablet 500 mg Q6H PRN    acetic acid 0.25 % irrigation PRN    albuterol nebulizer solution 2.5 mg Q4H PRN    albuterol-ipratropium 2.5 mg-0.5 mg/3 mL nebulizer solution 3 mL Q6H    bisacodyL EC tablet 10 mg Daily PRN    calcium gluconate 1 g in dextrose 5 % in water (D5W) 5 % 100 mL IVPB (MB+) Once    dextrose 50% injection 12.5 g PRN    diltiaZEM tablet 90 mg Q6H    glucagon (human recombinant) injection 1 mg PRN    guaiFENesin 100 mg/5 ml syrup 200 mg Q6H    heparin (porcine) injection 4,000 Units PRN    heparin (porcine) injection 5,000 Units Q8H    HYDROcodone-acetaminophen 5-325 mg per tablet 1 tablet Q8H    Followed by    [START ON 4/13/2022] HYDROcodone-acetaminophen 5-325 mg per tablet 1 tablet Q12H    insulin aspart U-100 injection 1-10 Units Q6H    insulin detemir U-100 injection 25 Units QHS    linezolid 600 mg/300 mL IVPB 600 mg Q12H    morphine injection 4 mg Q3H PRN    NORepinephrine 4 mg in dextrose 5 % 250 mL infusion Continuous    ondansetron injection 8 mg Q6H PRN    pantoprazole suspension 40 mg Daily    piperacillin-tazobactam (ZOSYN) 4.5 g in dextrose 5 % in water (D5W) 5 % 100 mL IVPB (MB+) Q12H    predniSONE tablet 5 mg Daily    sevelamer carbonate pwpk 0.8 g TID WM    silver sulfADIAZINE 1% cream Daily PRN    simethicone chewable tablet 80 mg TID PRN       Objective:     Vital Signs (Most Recent):  Temp: 99.3 °F (37.4 °C) (04/10/22 1600)  Pulse: 85 (04/10/22 1900)  Resp: (!) 24 (04/10/22 1900)  BP: (!) 97/45 (04/10/22 1900)  SpO2: 100 % (04/10/22 1900)  O2 Device (Oxygen Therapy): ventilator (04/10/22 4859)   Vital Signs (24h Range):  Temp:  [97.7 °F (36.5 °C)-103.1 °F (39.5 °C)] 99.3 °F (37.4  °C)  Pulse:  [] 85  Resp:  [11-36] 24  SpO2:  [100 %] 100 %  BP: ()/(35-51) 97/45     Weight: 72.5 kg (159 lb 13.3 oz) (04/10/22 0611)  Body mass index is 22.29 kg/m².  Body surface area is 1.91 meters squared.    I/O last 3 completed shifts:  In: 3050 [P.O.:240; NG/GT:1910; IV Piggyback:900]  Out: 1500 [Stool:1500]    Physical Exam  Constitutional:       Appearance: He is ill-appearing.   Neck:      Trachea: Tracheostomy present.   Cardiovascular:      Rate and Rhythm: Tachycardia present. Rhythm irregular.   Pulmonary:      Breath sounds: No wheezing or rales.   Abdominal:      Tenderness: There is no abdominal tenderness.   Musculoskeletal:      Cervical back: Neck supple.      Right lower leg: No edema.      Left lower leg: No edema.       Significant Labs:  BMP:   Recent Labs   Lab 04/08/22  1002   GLU 72*   *   K 3.8   CL 95*   CO2 26   BUN 36*   CREATININE 1.49*   CALCIUM 8.2*        Significant Imaging:

## 2022-04-11 NOTE — PROGRESS NOTES
[facility]  Nephrology  Progress Note    Patient Name: Og Garcia  MRN: [id number]  Admission Date: 12/23/2021  Hospital Length of Stay: 108 days  Attending Provider: Cecil Abernathy DO   Primary Care Physician: Hector Arndt DNP, FNP-C  Principal Problem:Denice gangrene    Subjective:     HPI: The patient is known from the previous nephrology consult at [facility].  He is no at Specialty and continues to need dialysis support.      Interval History:  He remains on ventilator.  He appears comfortable.    Review of patient's allergies indicates:  No Known Allergies  Current Facility-Administered Medications   Medication Frequency    0.9%  NaCl infusion (for blood administration) Q24H PRN    0.9%  NaCl infusion PRN    acetaminophen tablet 500 mg Q6H PRN    acetic acid 0.25 % irrigation PRN    albuterol nebulizer solution 2.5 mg Q4H PRN    albuterol-ipratropium 2.5 mg-0.5 mg/3 mL nebulizer solution 3 mL Q6H    bisacodyL EC tablet 10 mg Daily PRN    calcium gluconate 1 g in dextrose 5 % in water (D5W) 5 % 100 mL IVPB (MB+) Once    dextrose 50% injection 12.5 g PRN    diltiaZEM tablet 90 mg Q6H    glucagon (human recombinant) injection 1 mg PRN    guaiFENesin 100 mg/5 ml syrup 200 mg Q6H    heparin (porcine) injection 4,000 Units PRN    heparin (porcine) injection 5,000 Units Q8H    HYDROcodone-acetaminophen 5-325 mg per tablet 1 tablet Q8H    Followed by    [START ON 4/13/2022] HYDROcodone-acetaminophen 5-325 mg per tablet 1 tablet Q12H    insulin aspart U-100 injection 1-10 Units Q6H    insulin detemir U-100 injection 25 Units QHS    linezolid 600 mg/300 mL IVPB 600 mg Q12H    morphine injection 4 mg Q3H PRN    NORepinephrine 4 mg in dextrose 5 % 250 mL infusion Continuous    ondansetron injection 8 mg Q6H PRN    pantoprazole suspension 40 mg Daily    piperacillin-tazobactam (ZOSYN) 4.5 g in dextrose 5 % in water (D5W) 5 % 100 mL IVPB (MB+) Q12H    predniSONE  tablet 5 mg Daily    sevelamer carbonate pwpk 0.8 g TID WM    silver sulfADIAZINE 1% cream Daily PRN    simethicone chewable tablet 80 mg TID PRN       Objective:     Vital Signs (Most Recent):  Temp: 99.3 °F (37.4 °C) (04/10/22 1600)  Pulse: 85 (04/10/22 1900)  Resp: (!) 24 (04/10/22 1900)  BP: (!) 97/45 (04/10/22 1900)  SpO2: 100 % (04/10/22 1900)  O2 Device (Oxygen Therapy): ventilator (04/10/22 1545)   Vital Signs (24h Range):  Temp:  [97.7 °F (36.5 °C)-103.1 °F (39.5 °C)] 99.3 °F (37.4 °C)  Pulse:  [] 85  Resp:  [11-36] 24  SpO2:  [100 %] 100 %  BP: ()/(35-51) 97/45     Weight: 72.5 kg (159 lb 13.3 oz) (04/10/22 0611)  Body mass index is 22.29 kg/m².  Body surface area is 1.91 meters squared.    I/O last 3 completed shifts:  In: 3050 [P.O.:240; NG/GT:1910; IV Piggyback:900]  Out: 1500 [Stool:1500]    Physical Exam  Constitutional:       Appearance: He is ill-appearing.   Neck:      Trachea: Tracheostomy present.   Cardiovascular:      Rate and Rhythm: Tachycardia present. Rhythm irregular.   Pulmonary:      Breath sounds: No wheezing or rales.   Abdominal:      Tenderness: There is no abdominal tenderness.   Musculoskeletal:      Cervical back: Neck supple.      Right lower leg: No edema.      Left lower leg: No edema.       Significant Labs:  BMP:   Recent Labs   Lab 04/08/22  1002   GLU 72*   *   K 3.8   CL 95*   CO2 26   BUN 36*   CREATININE 1.49*   CALCIUM 8.2*        Significant Imaging:      Assessment/Plan:     * Denice gangrene  Continue wound care      ESRD (end stage renal disease)  Dialysis MWF    Necrotizing fasciitis  Continue wound care and antibiotics      Hypertension  Blood pressure borderline low.  He is requiring diltiazem for AFib        Thank you for your consult.     Huber Mcnamara MD  Nephrology  [facility]

## 2022-04-11 NOTE — PROGRESS NOTES
Pharmacokinetic Initial Assessment: IV Vancomycin    Assessment/Plan:    Initiate intravenous vancomycin as 1500 mg IV x 1  Desired empiric serum trough concentration is < 20  Draw vancomycin random level on 4/15 at 0600.  Pharmacy will continue to follow and monitor vancomycin.      Please contact pharmacy at extension [phone] with any questions regarding this assessment.     Patient brief summary:  Og Garcia is a 66 y.o. male initiated on antimicrobial therapy with IV Vancomycin for treatment of suspected skin & soft tissue infection    Drug Allergies:   Review of patient's allergies indicates:  No Known Allergies    Actual Body Weight:   78.9 kg    Renal Function:   Estimated Creatinine Clearance: 51.9 mL/min (A) (based on SCr of 1.49 mg/dL (H)).,     Dialysis Method (if applicable):  intermittent HD    CBC (last 72 hours):  Recent Labs   Lab Result Units 04/11/22  0429   WBC K/uL 20.70*   Hemoglobin g/dL 6.0*   Hematocrit % 18.1*   Platelet Count K/uL 453*   Lymphocytes % % 5.8*   Lymphocytes, Man % % 5*   Monocytes % % 6.1*   Monocytes, Man % % 6   Eosinophils % % 6.3*   Eosinophils, Man % % 9*   Basophils % % 0.1       Metabolic Panel (last 72 hours):  Recent Labs   Lab Result Units 04/08/22  1002   Sodium mmol/L 133*   Potassium mmol/L 3.8   Chloride mmol/L 95*   CO2 mmol/L 26   Glucose mg/dL 72*   BUN mg/dL 36*   Creatinine mg/dL 1.49*       Drug levels (last 3 results):  No results for input(s): VANCOMYCINRA, VANCOMYCINPE, VANCOMYCINTR in the last 72 hours.    Microbiologic Results:  Microbiology Results (last 7 days)       Procedure Component Value Units Date/Time    Culture, Lower Respiratory [[id number]] Collected: 04/10/22 1309    Order Status: Resulted Specimen: Respiratory from Tracheal Aspirate Updated: 04/10/22 1313    Blood culture (site 2) [[id number]] Collected: 04/10/22 1302    Order Status: Sent Specimen: Blood Updated: 04/10/22 1305    Blood culture (site 1) [[id number]] Collected: 04/10/22  1302    Order Status: Sent Specimen: Blood Updated: 04/10/22 1305    Culture, Wound [[id number]]  (Abnormal)  (Susceptibility) Collected: 04/05/22 1215    Order Status: Completed Specimen: Wound from Sacral Updated: 04/09/22 0908     Culture, Wound/Abscess Heavy Growth Enterobacter aerogenes     Comment: Corrected result: Previously reported as Gram-negative Bacilli on 4/6/2022 at 1027 CDT.         Heavy Growth Proteus mirabilis      Heavy Growth Pseudomonas aeruginosa     Comment: Corrected result: Previously reported as Gram-negative Bacilli on 4/8/2022 at 0723 CDT.       Culture, Anaerobe [[id number]] Collected: 04/05/22 1215    Order Status: Completed Specimen: Wound from Sacral Updated: 04/08/22 0455     Culture, Anaerobe No Anaerobes Isolated    Blood culture (site 2) [[id number]] Collected: 04/01/22 0903    Order Status: Completed Specimen: Blood Updated: 04/07/22 0630     Culture, Blood No Growth at 5 days    Blood culture (site 1) [[id number]] Collected: 04/01/22 0903    Order Status: Completed Specimen: Blood Updated: 04/07/22 0629     Culture, Blood No Growth at 5 days

## 2022-04-11 NOTE — ASSESSMENT & PLAN NOTE
Was doing trach collar over the weekend but had to be placed back on the vent earlier this week   - will treat his pain- hopefully this will improve his HR and then start back on weaning trails   4/7- aspirated yesterday - febrile this morning. Will start Clindamycin today -   4/8- clinically looks better- will resume PSV today as tolerated   4/9- increased respiratory rate and tachycardic on exam.  Patient had just been turned.  This is all likely secondary to pain.  Will treat his pain and then increase his CPAP to 4 hours t.i.d. as tolerated.  4/11 -continue PSV as tolerated

## 2022-04-11 NOTE — PROGRESS NOTES
[facility]  Pulmonology  Progress Note    Patient Name: Og Garcia  MRN: [id number]  Admission Date: 12/23/2021  Hospital Length of Stay: 109 days  Code Status: Prior  Attending Provider: Cecil Abernathy DO  Primary Care Provider: Hector Arndt DNP, FNP-C   Principal Problem: Denice gangrene    Subjective:     Interval History: awake alert. On AC.  Not on pressors right now.       Objective:     Vital Signs (Most Recent):  Temp: 97.7 °F (36.5 °C) (04/11/22 1005)  Pulse: 95 (04/11/22 1130)  Resp: (!) 23 (04/11/22 1130)  BP: (!) 97/57 (04/11/22 1130)  SpO2: 100 % (04/11/22 1130)   Vital Signs (24h Range):  Temp:  [97.7 °F (36.5 °C)-99.9 °F (37.7 °C)] 97.7 °F (36.5 °C)  Pulse:  [] 95  Resp:  [12-35] 23  SpO2:  [100 %] 100 %  BP: ()/(40-58) 97/57     Weight: 78.9 kg (173 lb 15.1 oz)  Body mass index is 24.26 kg/m².      Intake/Output Summary (Last 24 hours) at 4/11/2022 1142  Last data filed at 4/11/2022 0607  Gross per 24 hour   Intake 1840 ml   Output 500 ml   Net 1340 ml       Physical Exam  Vitals reviewed.   Constitutional:       General: He is not in acute distress.     Comments: Chronically ill appearing   HENT:      Right Ear: External ear normal.      Left Ear: External ear normal.      Mouth/Throat:      Mouth: Mucous membranes are moist.   Eyes:      Pupils: Pupils are equal, round, and reactive to light.   Cardiovascular:      Rate and Rhythm: Normal rate and regular rhythm.   Pulmonary:      Breath sounds: No wheezing or rales.   Abdominal:      Palpations: Abdomen is soft.   Musculoskeletal:         General: Normal range of motion.      Cervical back: Normal range of motion and neck supple.   Skin:     General: Skin is warm and dry.      Capillary Refill: Capillary refill takes less than 2 seconds.   Neurological:      Mental Status: He is alert. Mental status is at baseline.   Psychiatric:         Mood and Affect: Mood normal.       Vents:  Vent Mode: A/C  (04/11/22 0750)  Ventilator Initiated: Yes (04/10/22 0343)  Set Rate: 0 BPM (04/11/22 0218)  Vt Set: 0 mL (04/11/22 0218)  Pressure Support: 10 cmH20 (04/11/22 0218)  PEEP/CPAP: 5 cmH20 (04/11/22 0750)  Oxygen Concentration (%): 35 (04/11/22 0750)  Peak Airway Pressure: 16 cmH2O (04/11/22 0750)  Plateau Pressure: 20 cmH20 (03/07/22 0500)  Total Ve: 10.2 mL (04/11/22 0755)  F/VT Ratio<105 (RSBI): (!) 43.56 (04/10/22 1900)    Lines/Drains/Airways       Central Venous Catheter Line  Duration                  Hemodialysis Catheter 12/30/21 0900 right internal jugular 102 days              Drain  Duration                  Colostomy 12/18/21 1030 Descending/sigmoid  days         Gastrostomy/Enterostomy 03/09/22 1436 Percutaneous endoscopic gastrostomy (PEG) midline feeding 32 days              Airway  Duration                  Surgical Airway 01/04/22 Shiley 97 days              Peripheral Intravenous Line  Duration                  Peripheral IV - Single Lumen 04/04/22 1430 22 G Posterior;Right Hand 6 days         Peripheral IV - Single Lumen 04/10/22 1046 22 G Left;Posterior Hand 1 day                    Significant Labs:    CBC/Anemia Profile:  Recent Labs   Lab 04/11/22  0429   WBC 20.70*   HGB 6.0*   HCT 18.1*   *   MCV 90.5   RDW 15.4*        Chemistries:  No results for input(s): NA, K, CL, CO2, BUN, CREATININE, CALCIUM, ALBUMIN, PROT, BILITOT, ALKPHOS, ALT, AST, GLUCOSE, MG, PHOS in the last 48 hours.    All pertinent labs within the past 24 hours have been reviewed.    Significant Imaging:  I have reviewed all pertinent imaging results/findings within the past 24 hours.    Assessment/Plan:     Hypotension  Treated with 2 LNS overnight - febrile this morning - concern for sepsis - add MRSA coverage - pan culture - levophed for MAP >60   4/11- currently not on pressors but scheduled for HD today so may need it     Chronic respiratory failure  Was doing trach collar over the weekend but had to be  placed back on the vent earlier this week   - will treat his pain- hopefully this will improve his HR and then start back on weaning trails   4/7- aspirated yesterday - febrile this morning. Will start Clindamycin today -   4/8- clinically looks better- will resume PSV today as tolerated   4/9- increased respiratory rate and tachycardic on exam.  Patient had just been turned.  This is all likely secondary to pain.  Will treat his pain and then increase his CPAP to 4 hours t.i.d. as tolerated.  4/11 -continue PSV as tolerated     Tachycardia  Treated anemia   - concern this may be releated to pain and pain medication withdraw - his fentanyl patch was discontinued over the weekend which he has been on for weeks.  -plan - will start PO pain medication q8hrs then decrease to q12 hours after 7 days.   4/8 - improving   4/9-heart rate up on exam.  Secondary to pain.  Will treat p.r.n. pain medication.    Pressure ulcer of sacral region, unstageable  Culture and sensitivity reviewed -now on vancomycin and zosyn   - wound care NP to evaluate to see if he may need debridement     Fever  2/28 - resp culture with pseudomonas and klebsiella  treat with zosyn for 7 days   Blood culture from 2/25 also growing Pseudomonas. Sensitive to zosyn. Will need to continue zosyn for at least 10 days total  - low grade fever since 3/28, CXR- unchanged and repeat CBC- WBC 20.57  - removed malpositioned LUE PICC.  - blood cultures- NGTD after 72  Hours   -4/6-  Temp 101 at noon yesterday- wound cultures with heavy growth gram neg bacilli - will go ahead and add Rocephin and continue to follow cultures    4/7- febrile this morning - added clinda given his aspiration event   4/8 - afebrile- would cultures reviewed - sensitive to Rocephin   4/9-  wound culture sensitivity has returned; escalating to Zosyn specially given his fever overnight.  4/10--- febrile - TMAx 103.1 - will add MRSA coverage - pan culture   4/11- Tmax 99.9 after adding MRSA  coverage     ESRD (end stage renal disease)  Started on HD 12/30   Continue with HD per nephrology                   FLORESITA Shafer-ACNP  Pulmonology  [facility]

## 2022-04-11 NOTE — NURSING
0420 - Sacral dsg changed during morning bath. Pt with facial grimaces, moaning, and slight increase in heart rate . Given Morphine 4mg IVP  for pain .Safety measures ongoing.

## 2022-04-11 NOTE — ASSESSMENT & PLAN NOTE
Culture and sensitivity reviewed -now on vancomycin and zosyn   - wound care NP to evaluate to see if he may need debridement

## 2022-04-11 NOTE — ASSESSMENT & PLAN NOTE
Treated with 2 LNS overnight - febrile this morning - concern for sepsis - add MRSA coverage - pan culture - levophed for MAP >60   4/11- currently not on pressors but scheduled for HD today so may need it

## 2022-04-11 NOTE — PLAN OF CARE
Problem: Adult Inpatient Plan of Care  Goal: Optimal Comfort and Wellbeing  Outcome: Ongoing, Progressing     Problem: Bleeding (Sepsis/Septic Shock)  Goal: Absence of Bleeding  Outcome: Ongoing, Progressing     Problem: Nutrition Impaired (Sepsis/Septic Shock)  Goal: Optimal Nutrition Intake  Outcome: Ongoing, Progressing     Problem: Adult Inpatient Plan of Care  Goal: Plan of Care Review  Outcome: Ongoing, Not Progressing  Goal: Patient-Specific Goal (Individualized)  Outcome: Ongoing, Not Progressing  Goal: Absence of Hospital-Acquired Illness or Injury  Outcome: Ongoing, Not Progressing  Goal: Readiness for Transition of Care  Outcome: Ongoing, Not Progressing     Problem: Adjustment to Illness (Sepsis/Septic Shock)  Goal: Optimal Coping  Outcome: Ongoing, Not Progressing     Problem: Glycemic Control Impaired (Sepsis/Septic Shock)  Goal: Blood Glucose Level Within Desired Range  Outcome: Ongoing, Not Progressing     Problem: Infection Progression (Sepsis/Septic Shock)  Goal: Absence of Infection Signs and Symptoms  Outcome: Ongoing, Not Progressing     Problem: Renal Function Impairment (Acute Kidney Injury/Impairment)  Goal: Effective Renal Function  Outcome: Ongoing, Not Progressing

## 2022-04-11 NOTE — SUBJECTIVE & OBJECTIVE
Interval History: awake alert. On AC.  Not on pressors right now.       Objective:     Vital Signs (Most Recent):  Temp: 97.7 °F (36.5 °C) (04/11/22 1005)  Pulse: 95 (04/11/22 1130)  Resp: (!) 23 (04/11/22 1130)  BP: (!) 97/57 (04/11/22 1130)  SpO2: 100 % (04/11/22 1130)   Vital Signs (24h Range):  Temp:  [97.7 °F (36.5 °C)-99.9 °F (37.7 °C)] 97.7 °F (36.5 °C)  Pulse:  [] 95  Resp:  [12-35] 23  SpO2:  [100 %] 100 %  BP: ()/(40-58) 97/57     Weight: 78.9 kg (173 lb 15.1 oz)  Body mass index is 24.26 kg/m².      Intake/Output Summary (Last 24 hours) at 4/11/2022 1142  Last data filed at 4/11/2022 0607  Gross per 24 hour   Intake 1840 ml   Output 500 ml   Net 1340 ml       Physical Exam  Vitals reviewed.   Constitutional:       General: He is not in acute distress.     Comments: Chronically ill appearing   HENT:      Right Ear: External ear normal.      Left Ear: External ear normal.      Mouth/Throat:      Mouth: Mucous membranes are moist.   Eyes:      Pupils: Pupils are equal, round, and reactive to light.   Cardiovascular:      Rate and Rhythm: Normal rate and regular rhythm.   Pulmonary:      Breath sounds: No wheezing or rales.   Abdominal:      Palpations: Abdomen is soft.   Musculoskeletal:         General: Normal range of motion.      Cervical back: Normal range of motion and neck supple.   Skin:     General: Skin is warm and dry.      Capillary Refill: Capillary refill takes less than 2 seconds.   Neurological:      Mental Status: He is alert. Mental status is at baseline.   Psychiatric:         Mood and Affect: Mood normal.       Vents:  Vent Mode: A/C (04/11/22 0750)  Ventilator Initiated: Yes (04/10/22 0343)  Set Rate: 0 BPM (04/11/22 0218)  Vt Set: 0 mL (04/11/22 0218)  Pressure Support: 10 cmH20 (04/11/22 0218)  PEEP/CPAP: 5 cmH20 (04/11/22 0750)  Oxygen Concentration (%): 35 (04/11/22 0750)  Peak Airway Pressure: 16 cmH2O (04/11/22 0750)  Plateau Pressure: 20 cmH20 (03/07/22 0500)  Total Ve:  10.2 mL (04/11/22 0755)  F/VT Ratio<105 (RSBI): (!) 43.56 (04/10/22 1900)    Lines/Drains/Airways       Central Venous Catheter Line  Duration                  Hemodialysis Catheter 12/30/21 0900 right internal jugular 102 days              Drain  Duration                  Colostomy 12/18/21 1030 Descending/sigmoid  days         Gastrostomy/Enterostomy 03/09/22 1436 Percutaneous endoscopic gastrostomy (PEG) midline feeding 32 days              Airway  Duration                  Surgical Airway 01/04/22 Shiley 97 days              Peripheral Intravenous Line  Duration                  Peripheral IV - Single Lumen 04/04/22 1430 22 G Posterior;Right Hand 6 days         Peripheral IV - Single Lumen 04/10/22 1046 22 G Left;Posterior Hand 1 day                    Significant Labs:    CBC/Anemia Profile:  Recent Labs   Lab 04/11/22  0429   WBC 20.70*   HGB 6.0*   HCT 18.1*   *   MCV 90.5   RDW 15.4*        Chemistries:  No results for input(s): NA, K, CL, CO2, BUN, CREATININE, CALCIUM, ALBUMIN, PROT, BILITOT, ALKPHOS, ALT, AST, GLUCOSE, MG, PHOS in the last 48 hours.    All pertinent labs within the past 24 hours have been reviewed.    Significant Imaging:  I have reviewed all pertinent imaging results/findings within the past 24 hours.

## 2022-04-12 NOTE — PROGRESS NOTES
Placed patient back on the vent. via ACV with previous settings for rest.  Patient did 4 hours of CPAP 5, PSV 10, FIO2 35% and did great with no complications.

## 2022-04-12 NOTE — ASSESSMENT & PLAN NOTE
2/28 - resp culture with pseudomonas and klebsiella  treat with zosyn for 7 days   Blood culture from 2/25 also growing Pseudomonas. Sensitive to zosyn. Will need to continue zosyn for at least 10 days total  - low grade fever since 3/28, CXR- unchanged and repeat CBC- WBC 20.57  - removed malpositioned LUE PICC.  - blood cultures- NGTD after 72  Hours   -4/6-  Temp 101 at noon yesterday- wound cultures with heavy growth gram neg bacilli - will go ahead and add Rocephin and continue to follow cultures    4/7- febrile this morning - added clinda given his aspiration event   4/8 - afebrile- would cultures reviewed - sensitive to Rocephin   4/9-  wound culture sensitivity has returned; escalating to Zosyn specially given his fever overnight.  4/10--- febrile - TMAx 103.1 - will add MRSA coverage - pan culture   4/11- Tmax 99.9 after adding MRSA coverage   4/12- no fever in last 24 hours

## 2022-04-12 NOTE — ASSESSMENT & PLAN NOTE
Culture and sensitivity reviewed -now on vancomycin and zosyn   - wound care NP to evaluate to see if he may need debridement   4/12- patient to go back to OR tomorrow

## 2022-04-12 NOTE — PROGRESS NOTES
[facility]  Nephrology  Progress Note    Patient Name: Og Garcia  MRN: [id number]  Admission Date: 12/23/2021  Hospital Length of Stay: 110 days  Attending Provider: Cecil Abernathy DO   Primary Care Physician: Hector Arndt DNP, FNP-C  Principal Problem:Denice gangrene    Subjective:     HPI: The patient is known from the previous nephrology consult at [facility].  He is no at Specialty and continues to need dialysis support.      Interval History: The patient's condition is the same.  He is more awake today.  No other changes.    Review of patient's allergies indicates:  No Known Allergies  Current Facility-Administered Medications   Medication Frequency    0.9%  NaCl infusion (for blood administration) Q24H PRN    0.9%  NaCl infusion PRN    acetaminophen tablet 500 mg Q6H PRN    acetic acid 0.25 % irrigation PRN    albuterol nebulizer solution 2.5 mg Q4H PRN    albuterol-ipratropium 2.5 mg-0.5 mg/3 mL nebulizer solution 3 mL Q6H    bisacodyL EC tablet 10 mg Daily PRN    calcium gluconate 1 g in dextrose 5 % in water (D5W) 5 % 100 mL IVPB (MB+) Once    dextrose 50% injection 12.5 g PRN    diltiaZEM tablet 90 mg Q6H    glucagon (human recombinant) injection 1 mg PRN    guaiFENesin 100 mg/5 ml syrup 200 mg Q6H    heparin (porcine) injection 4,000 Units PRN    heparin (porcine) injection 5,000 Units Q8H    HYDROcodone-acetaminophen 5-325 mg per tablet 1 tablet Q8H    Followed by    [START ON 4/13/2022] HYDROcodone-acetaminophen 5-325 mg per tablet 1 tablet Q12H    insulin aspart U-100 injection 1-10 Units Q6H    insulin detemir U-100 injection 25 Units QHS    NORepinephrine 4 mg in dextrose 5 % 250 mL infusion Continuous    ondansetron injection 8 mg Q6H PRN    pantoprazole suspension 40 mg Daily    piperacillin-tazobactam (ZOSYN) 4.5 g in dextrose 5 % in water (D5W) 5 % 100 mL IVPB (MB+) Q12H    predniSONE tablet 5 mg Daily    sevelamer carbonate pwpk 0.8 g  TID WM    silver sulfADIAZINE 1% cream Daily PRN    simethicone chewable tablet 80 mg TID PRN    vancomycin - pharmacy to dose pharmacy to manage frequency       Objective:     Vital Signs (Most Recent):  Temp: 99 °F (37.2 °C) (04/12/22 1100)  Pulse: 90 (04/12/22 1300)  Resp: (!) 22 (04/12/22 1413)  BP: 124/68 (04/12/22 1300)  SpO2: 100 % (04/12/22 1300)  O2 Device (Oxygen Therapy): ventilator (04/12/22 1515)   Vital Signs (24h Range):  Temp:  [97.5 °F (36.4 °C)-99.1 °F (37.3 °C)] 99 °F (37.2 °C)  Pulse:  [77-93] 90  Resp:  [13-30] 22  SpO2:  [99 %-100 %] 100 %  BP: ()/(46-68) 124/68     Weight: 75.8 kg (167 lb 1.7 oz) (04/12/22 0502)  Body mass index is 23.31 kg/m².  Body surface area is 1.95 meters squared.    I/O last 3 completed shifts:  In: 5151.7 [P.O.:180; Blood:711.7; Other:450; NG/GT:3060; IV Piggyback:750]  Out: -     Physical Exam  Vitals reviewed.   HENT:      Head: Atraumatic.      Mouth/Throat:      Mouth: Mucous membranes are moist.   Cardiovascular:      Rate and Rhythm: Normal rate and regular rhythm.   Pulmonary:      Effort: Pulmonary effort is normal.      Comments: ventilated  Abdominal:      General: Abdomen is flat.   Neurological:      Mental Status: He is alert.       Significant Labs:  BMP:   Recent Labs   Lab 04/08/22  1002   GLU 72*   *   K 3.8   CL 95*   CO2 26   BUN 36*   CREATININE 1.49*   CALCIUM 8.2*     CBC:   Recent Labs   Lab 04/11/22  0429   WBC 20.70*   RBC 2.00*   HGB 6.0*   HCT 18.1*   *   MCV 90.5   MCH 30.0   MCHC 33.1        Significant Imaging:  Labs: Reviewed    Assessment/Plan:     ESRD (end stage renal disease)  Dialysis MWF  Continue with scheduled hemodialysis  4/12/2022  Dialysis sessions are going well.        Thank you for your consult. I will follow-up with patient. Please contact us if you have any additional questions.    Edwin Pryor Jr, MD  Nephrology  [facility]

## 2022-04-12 NOTE — PROGRESS NOTES
Wound care note;  Patient skin was evaluated today with dressing changes.  ELSA Young  FNP assessed sacral wound. Wound cont with strong foul odor even after increase dressing to BID. Necrotic tissue .Denuded raw tissue to outer edges.   Dr Cazares was re consulted on wound to sacral bilateral legs, left heel.  Dressing to Left heel and Right lower leg duoderm intact, will change on 4/12/22.  Cont vashe moist gauze to sacral   Cont turn protocol  Pillows to offload heels   On low air loss mattress

## 2022-04-12 NOTE — SUBJECTIVE & OBJECTIVE
Interval History: No acute events overnight. The patient is currently resting comfortably. He is currently afebrile and vital signs are stable.    Objective:     Vital Signs (Most Recent):  Temp: 97.8 °F (36.6 °C) (04/12/22 0715)  Pulse: 89 (04/12/22 1215)  Resp: (!) 24 (04/12/22 1215)  BP: 136/67 (04/12/22 1132)  SpO2: 100 % (04/12/22 1215)   Vital Signs (24h Range):  Temp:  [97.5 °F (36.4 °C)-100.1 °F (37.8 °C)] 97.8 °F (36.6 °C)  Pulse:  [] 89  Resp:  [13-30] 24  SpO2:  [99 %-100 %] 100 %  BP: ()/(46-67) 136/67     Weight: 75.8 kg (167 lb 1.7 oz)  Body mass index is 23.31 kg/m².      Intake/Output Summary (Last 24 hours) at 4/12/2022 1229  Last data filed at 4/12/2022 0953  Gross per 24 hour   Intake 3281.67 ml   Output --   Net 3281.67 ml         Physical Exam  Vitals reviewed.   Constitutional:       General: He is not in acute distress.     Appearance: He is ill-appearing.      Comments: Chronically ill appearing   HENT:      Right Ear: External ear normal.      Left Ear: External ear normal.      Mouth/Throat:      Mouth: Mucous membranes are moist.   Eyes:      Pupils: Pupils are equal, round, and reactive to light.   Cardiovascular:      Rate and Rhythm: Regular rhythm. Tachycardia present.   Pulmonary:      Breath sounds: Normal breath sounds. No wheezing, rhonchi or rales.   Abdominal:      Palpations: Abdomen is soft.   Musculoskeletal:         General: Normal range of motion.      Cervical back: Normal range of motion and neck supple.   Skin:     General: Skin is warm and dry.      Capillary Refill: Capillary refill takes less than 2 seconds.   Neurological:      Mental Status: He is alert. Mental status is at baseline.   Psychiatric:         Mood and Affect: Mood normal.       Vents:  Vent Mode: CPAP/PSV (04/12/22 1215)  Ventilator Initiated: Yes (04/10/22 0343)  Set Rate: 0 BPM (04/12/22 1215)  Vt Set: 0 mL (04/12/22 1215)  Pressure Support: 10 cmH20 (04/11/22 2205)  PEEP/CPAP: 5 cmH20  (04/12/22 1215)  Oxygen Concentration (%): 35 (04/12/22 1215)  Peak Airway Pressure: 16 cmH2O (04/12/22 1215)  Plateau Pressure: 20 cmH20 (03/07/22 0500)  Total Ve: 9.4 mL (04/12/22 1215)  F/VT Ratio<105 (RSBI): (!) 64.86 (04/12/22 1215)    Lines/Drains/Airways       Central Venous Catheter Line  Duration                  Hemodialysis Catheter 12/30/21 0900 right internal jugular 103 days              Drain  Duration                  Colostomy 12/18/21 1030 Descending/sigmoid  days         Gastrostomy/Enterostomy 03/09/22 1436 Percutaneous endoscopic gastrostomy (PEG) midline feeding 33 days              Airway  Duration                  Surgical Airway 01/04/22 Shiley 98 days              Peripheral Intravenous Line  Duration                  Peripheral IV - Single Lumen 04/04/22 1430 22 G Posterior;Right Hand 7 days         Peripheral IV - Single Lumen 04/10/22 1046 22 G Left;Posterior Hand 2 days         Peripheral IV - Single Lumen 04/11/22 1623 18 G Anterior;Proximal;Right Forearm <1 day                    Significant Labs:    CBC/Anemia Profile:  Recent Labs   Lab 04/11/22  0429   WBC 20.70*   HGB 6.0*   HCT 18.1*   *   MCV 90.5   RDW 15.4*          Chemistries:  No results for input(s): NA, K, CL, CO2, BUN, CREATININE, CALCIUM, ALBUMIN, PROT, BILITOT, ALKPHOS, ALT, AST, GLUCOSE, MG, PHOS in the last 48 hours.      All pertinent labs within the past 24 hours have been reviewed.    Significant Imaging:  I have reviewed all pertinent imaging results/findings within the past 24 hours.

## 2022-04-12 NOTE — NURSING
Pt resting on ventilator at this time total care ongoing no distress noted int;s patent safety mesures ongoing

## 2022-04-12 NOTE — SUBJECTIVE & OBJECTIVE
Interval History: The patient's condition is the same.  He is more awake today.  No other changes.    Review of patient's allergies indicates:  No Known Allergies  Current Facility-Administered Medications   Medication Frequency    0.9%  NaCl infusion (for blood administration) Q24H PRN    0.9%  NaCl infusion PRN    acetaminophen tablet 500 mg Q6H PRN    acetic acid 0.25 % irrigation PRN    albuterol nebulizer solution 2.5 mg Q4H PRN    albuterol-ipratropium 2.5 mg-0.5 mg/3 mL nebulizer solution 3 mL Q6H    bisacodyL EC tablet 10 mg Daily PRN    calcium gluconate 1 g in dextrose 5 % in water (D5W) 5 % 100 mL IVPB (MB+) Once    dextrose 50% injection 12.5 g PRN    diltiaZEM tablet 90 mg Q6H    glucagon (human recombinant) injection 1 mg PRN    guaiFENesin 100 mg/5 ml syrup 200 mg Q6H    heparin (porcine) injection 4,000 Units PRN    heparin (porcine) injection 5,000 Units Q8H    HYDROcodone-acetaminophen 5-325 mg per tablet 1 tablet Q8H    Followed by    [START ON 4/13/2022] HYDROcodone-acetaminophen 5-325 mg per tablet 1 tablet Q12H    insulin aspart U-100 injection 1-10 Units Q6H    insulin detemir U-100 injection 25 Units QHS    NORepinephrine 4 mg in dextrose 5 % 250 mL infusion Continuous    ondansetron injection 8 mg Q6H PRN    pantoprazole suspension 40 mg Daily    piperacillin-tazobactam (ZOSYN) 4.5 g in dextrose 5 % in water (D5W) 5 % 100 mL IVPB (MB+) Q12H    predniSONE tablet 5 mg Daily    sevelamer carbonate pwpk 0.8 g TID WM    silver sulfADIAZINE 1% cream Daily PRN    simethicone chewable tablet 80 mg TID PRN    vancomycin - pharmacy to dose pharmacy to manage frequency       Objective:     Vital Signs (Most Recent):  Temp: 99 °F (37.2 °C) (04/12/22 1100)  Pulse: 90 (04/12/22 1300)  Resp: (!) 22 (04/12/22 1413)  BP: 124/68 (04/12/22 1300)  SpO2: 100 % (04/12/22 1300)  O2 Device (Oxygen Therapy): ventilator (04/12/22 1757)   Vital Signs (24h Range):  Temp:  [97.5 °F (36.4 °C)-99.1 °F (37.3 °C)] 99 °F  (37.2 °C)  Pulse:  [77-93] 90  Resp:  [13-30] 22  SpO2:  [99 %-100 %] 100 %  BP: ()/(46-68) 124/68     Weight: 75.8 kg (167 lb 1.7 oz) (04/12/22 0502)  Body mass index is 23.31 kg/m².  Body surface area is 1.95 meters squared.    I/O last 3 completed shifts:  In: 5151.7 [P.O.:180; Blood:711.7; Other:450; NG/GT:3060; IV Piggyback:750]  Out: -     Physical Exam  Vitals reviewed.   HENT:      Head: Atraumatic.      Mouth/Throat:      Mouth: Mucous membranes are moist.   Cardiovascular:      Rate and Rhythm: Normal rate and regular rhythm.   Pulmonary:      Effort: Pulmonary effort is normal.      Comments: ventilated  Abdominal:      General: Abdomen is flat.   Neurological:      Mental Status: He is alert.       Significant Labs:  BMP:   Recent Labs   Lab 04/08/22  1002   GLU 72*   *   K 3.8   CL 95*   CO2 26   BUN 36*   CREATININE 1.49*   CALCIUM 8.2*     CBC:   Recent Labs   Lab 04/11/22  0429   WBC 20.70*   RBC 2.00*   HGB 6.0*   HCT 18.1*   *   MCV 90.5   MCH 30.0   MCHC 33.1        Significant Imaging:  Labs: Reviewed

## 2022-04-12 NOTE — SUBJECTIVE & OBJECTIVE
Interval History: The patient is resting.  No acute changes.    Review of patient's allergies indicates:  No Known Allergies  Current Facility-Administered Medications   Medication Frequency    0.9%  NaCl infusion (for blood administration) Q24H PRN    0.9%  NaCl infusion PRN    acetaminophen tablet 500 mg Q6H PRN    acetic acid 0.25 % irrigation PRN    albuterol nebulizer solution 2.5 mg Q4H PRN    albuterol-ipratropium 2.5 mg-0.5 mg/3 mL nebulizer solution 3 mL Q6H    bisacodyL EC tablet 10 mg Daily PRN    calcium gluconate 1 g in dextrose 5 % in water (D5W) 5 % 100 mL IVPB (MB+) Once    dextrose 50% injection 12.5 g PRN    diltiaZEM tablet 90 mg Q6H    glucagon (human recombinant) injection 1 mg PRN    guaiFENesin 100 mg/5 ml syrup 200 mg Q6H    heparin (porcine) injection 4,000 Units PRN    heparin (porcine) injection 5,000 Units Q8H    HYDROcodone-acetaminophen 5-325 mg per tablet 1 tablet Q8H    Followed by    [START ON 4/13/2022] HYDROcodone-acetaminophen 5-325 mg per tablet 1 tablet Q12H    insulin aspart U-100 injection 1-10 Units Q6H    insulin detemir U-100 injection 25 Units QHS    NORepinephrine 4 mg in dextrose 5 % 250 mL infusion Continuous    ondansetron injection 8 mg Q6H PRN    pantoprazole suspension 40 mg Daily    piperacillin-tazobactam (ZOSYN) 4.5 g in dextrose 5 % in water (D5W) 5 % 100 mL IVPB (MB+) Q12H    predniSONE tablet 5 mg Daily    sevelamer carbonate pwpk 0.8 g TID WM    silver sulfADIAZINE 1% cream Daily PRN    simethicone chewable tablet 80 mg TID PRN    vancomycin - pharmacy to dose pharmacy to manage frequency       Objective:     Vital Signs (Most Recent):  Temp: 100.1 °F (37.8 °C) (04/11/22 1600)  Pulse: 84 (04/11/22 1935)  Resp: (!) 24 (04/11/22 1935)  BP: (!) 115/57 (04/11/22 1800)  SpO2: 100 % (04/11/22 1935)  O2 Device (Oxygen Therapy): ventilator (04/11/22 2167)   Vital Signs (24h Range):  Temp:  [98.9 °F (37.2 °C)-100.1 °F (37.8 °C)] 100.1 °F (37.8 °C)  Pulse:  []  84  Resp:  [15-35] 24  SpO2:  [99 %-100 %] 100 %  BP: ()/(45-59) 115/57     Weight: 78.9 kg (173 lb 15.1 oz) (04/11/22 0700)  Body mass index is 24.26 kg/m².  Body surface area is 1.99 meters squared.    I/O last 3 completed shifts:  In: 4351.7 [P.O.:300; Blood:711.7; Other:450; NG/GT:2090; IV Piggyback:800]  Out: 1000 [Stool:1000]    Physical Exam  Vitals reviewed.   HENT:      Head: Atraumatic.   Cardiovascular:      Rate and Rhythm: Regular rhythm.   Pulmonary:      Effort: Pulmonary effort is normal.   Abdominal:      General: Abdomen is flat.   Neurological:      Mental Status: He is alert.       Significant Labs:  BMP:   Recent Labs   Lab 04/08/22  1002   GLU 72*   *   K 3.8   CL 95*   CO2 26   BUN 36*   CREATININE 1.49*   CALCIUM 8.2*     CBC:   Recent Labs   Lab 04/11/22  0429   WBC 20.70*   RBC 2.00*   HGB 6.0*   HCT 18.1*   *   MCV 90.5   MCH 30.0   MCHC 33.1        Significant Imaging:  Labs: Reviewed

## 2022-04-12 NOTE — PROGRESS NOTES
[facility]  Nephrology  Progress Note    Patient Name: Og Garcia  MRN: [id number]  Admission Date: 12/23/2021  Hospital Length of Stay: 109 days  Attending Provider: Cecil Abernathy DO   Primary Care Physician: Hector Arndt DNP, FNP-C  Principal Problem:Denice gangrene    Subjective:     HPI: The patient is known from the previous nephrology consult at [facility].  He is no at Specialty and continues to need dialysis support.      Interval History: The patient is resting.  No acute changes.    Review of patient's allergies indicates:  No Known Allergies  Current Facility-Administered Medications   Medication Frequency    0.9%  NaCl infusion (for blood administration) Q24H PRN    0.9%  NaCl infusion PRN    acetaminophen tablet 500 mg Q6H PRN    acetic acid 0.25 % irrigation PRN    albuterol nebulizer solution 2.5 mg Q4H PRN    albuterol-ipratropium 2.5 mg-0.5 mg/3 mL nebulizer solution 3 mL Q6H    bisacodyL EC tablet 10 mg Daily PRN    calcium gluconate 1 g in dextrose 5 % in water (D5W) 5 % 100 mL IVPB (MB+) Once    dextrose 50% injection 12.5 g PRN    diltiaZEM tablet 90 mg Q6H    glucagon (human recombinant) injection 1 mg PRN    guaiFENesin 100 mg/5 ml syrup 200 mg Q6H    heparin (porcine) injection 4,000 Units PRN    heparin (porcine) injection 5,000 Units Q8H    HYDROcodone-acetaminophen 5-325 mg per tablet 1 tablet Q8H    Followed by    [START ON 4/13/2022] HYDROcodone-acetaminophen 5-325 mg per tablet 1 tablet Q12H    insulin aspart U-100 injection 1-10 Units Q6H    insulin detemir U-100 injection 25 Units QHS    NORepinephrine 4 mg in dextrose 5 % 250 mL infusion Continuous    ondansetron injection 8 mg Q6H PRN    pantoprazole suspension 40 mg Daily    piperacillin-tazobactam (ZOSYN) 4.5 g in dextrose 5 % in water (D5W) 5 % 100 mL IVPB (MB+) Q12H    predniSONE tablet 5 mg Daily    sevelamer carbonate pwpk 0.8 g TID WM    silver sulfADIAZINE 1% cream  Daily PRN    simethicone chewable tablet 80 mg TID PRN    vancomycin - pharmacy to dose pharmacy to manage frequency       Objective:     Vital Signs (Most Recent):  Temp: 100.1 °F (37.8 °C) (04/11/22 1600)  Pulse: 84 (04/11/22 1935)  Resp: (!) 24 (04/11/22 1935)  BP: (!) 115/57 (04/11/22 1800)  SpO2: 100 % (04/11/22 1935)  O2 Device (Oxygen Therapy): ventilator (04/11/22 1730)   Vital Signs (24h Range):  Temp:  [98.9 °F (37.2 °C)-100.1 °F (37.8 °C)] 100.1 °F (37.8 °C)  Pulse:  [] 84  Resp:  [15-35] 24  SpO2:  [99 %-100 %] 100 %  BP: ()/(45-59) 115/57     Weight: 78.9 kg (173 lb 15.1 oz) (04/11/22 0700)  Body mass index is 24.26 kg/m².  Body surface area is 1.99 meters squared.    I/O last 3 completed shifts:  In: 4351.7 [P.O.:300; Blood:711.7; Other:450; NG/GT:2090; IV Piggyback:800]  Out: 1000 [Stool:1000]    Physical Exam  Vitals reviewed.   HENT:      Head: Atraumatic.   Cardiovascular:      Rate and Rhythm: Regular rhythm.   Pulmonary:      Effort: Pulmonary effort is normal.   Abdominal:      General: Abdomen is flat.   Neurological:      Mental Status: He is alert.       Significant Labs:  BMP:   Recent Labs   Lab 04/08/22  1002   GLU 72*   *   K 3.8   CL 95*   CO2 26   BUN 36*   CREATININE 1.49*   CALCIUM 8.2*     CBC:   Recent Labs   Lab 04/11/22  0429   WBC 20.70*   RBC 2.00*   HGB 6.0*   HCT 18.1*   *   MCV 90.5   MCH 30.0   MCHC 33.1        Significant Imaging:  Labs: Reviewed    Assessment/Plan:     ESRD (end stage renal disease)  Dialysis MWF  Continue with scheduled hemodialysis        Thank you for your consult. I will follow-up with patient. Please contact us if you have any additional questions.    Broxton Roya Jr, MD  Nephrology  [facility]

## 2022-04-12 NOTE — PLAN OF CARE
Problem: Adult Inpatient Plan of Care  Goal: Plan of Care Review  Outcome: Ongoing, Progressing  Goal: Optimal Comfort and Wellbeing  Outcome: Ongoing, Progressing     Problem: Adjustment to Illness (Sepsis/Septic Shock)  Goal: Optimal Coping  Outcome: Ongoing, Progressing     Problem: Bleeding (Sepsis/Septic Shock)  Goal: Absence of Bleeding  Outcome: Ongoing, Progressing     Problem: Glycemic Control Impaired (Sepsis/Septic Shock)  Goal: Blood Glucose Level Within Desired Range  Outcome: Ongoing, Progressing     Problem: Nutrition Impaired (Sepsis/Septic Shock)  Goal: Optimal Nutrition Intake  Outcome: Ongoing, Progressing     Problem: Fall Injury Risk  Goal: Absence of Fall and Fall-Related Injury  Outcome: Ongoing, Progressing     Problem: Inability to Wean (Mechanical Ventilation, Invasive)  Goal: Mechanical Ventilation Liberation  Outcome: Ongoing, Progressing     Problem: Adult Inpatient Plan of Care  Goal: Patient-Specific Goal (Individualized)  Outcome: Ongoing, Not Progressing  Goal: Absence of Hospital-Acquired Illness or Injury  Outcome: Ongoing, Not Progressing  Goal: Readiness for Transition of Care  Outcome: Ongoing, Not Progressing     Problem: Infection Progression (Sepsis/Septic Shock)  Goal: Absence of Infection Signs and Symptoms  Outcome: Ongoing, Not Progressing     Problem: Fluid and Electrolyte Imbalance (Acute Kidney Injury/Impairment)  Goal: Fluid and Electrolyte Balance  Outcome: Ongoing, Not Progressing     Problem: Oral Intake Inadequate (Acute Kidney Injury/Impairment)  Goal: Optimal Nutrition Intake  Outcome: Ongoing, Not Progressing     Problem: Infection  Goal: Absence of Infection Signs and Symptoms  Outcome: Ongoing, Not Progressing     Problem: Communication Impairment (Mechanical Ventilation, Invasive)  Goal: Effective Communication  Outcome: Ongoing, Not Progressing     Problem: Device-Related Complication Risk (Mechanical Ventilation, Invasive)  Goal: Optimal Device  Function  Outcome: Ongoing, Not Progressing

## 2022-04-12 NOTE — PROGRESS NOTES
[facility]  General Surgery  Progress Note    Subjective:     Interval History:  Patient really no acute events just some worsening pressure ulcer wounds on his sacrum likely stage IV as well as on his heel on the left and lateral aspect of his right foot.    Post-Op Info:  * No surgery found *          Medications:  Continuous Infusions:   NORepinephrine bitartrate-D5W 0.2 mcg/kg/min (04/10/22 1246)     Scheduled Meds:   albuterol-ipratropium  3 mL Nebulization Q6H    calcium gluconate IVPB  1 g Intravenous Once    diltiaZEM  90 mg Per G Tube Q6H    guaiFENesin 100 mg/5 ml  200 mg Per G Tube Q6H    heparin (porcine)  5,000 Units Subcutaneous Q8H    HYDROcodone-acetaminophen  1 tablet Oral Q8H    Followed by    [START ON 4/13/2022] HYDROcodone-acetaminophen  1 tablet Oral Q12H    insulin aspart U-100  1-10 Units Subcutaneous Q6H    insulin detemir U-100  25 Units Subcutaneous QHS    pantoprazole  40 mg Per G Tube Daily    piperacillin-tazobactam (ZOSYN) IVPB  4.5 g Intravenous Q12H    predniSONE  5 mg Per G Tube Daily    sevelamer carbonate  0.8 g Per G Tube TID WM     PRN Meds:sodium chloride, sodium chloride 0.9%, acetaminophen, acetic acid, albuterol sulfate, bisacodyL, dextrose 50%, glucagon (human recombinant), heparin (porcine), ondansetron, silver sulfADIAZINE 1%, simethicone, vancomycin - pharmacy to dose     Objective:     Vital Signs (Most Recent):  Temp: 97.8 °F (36.6 °C) (04/12/22 0715)  Pulse: 90 (04/12/22 1001)  Resp: 16 (04/12/22 1001)  BP: 136/67 (04/12/22 1132)  SpO2: 100 % (04/12/22 1001) Vital Signs (24h Range):  Temp:  [97.5 °F (36.4 °C)-100.1 °F (37.8 °C)] 97.8 °F (36.6 °C)  Pulse:  [] 90  Resp:  [13-30] 16  SpO2:  [99 %-100 %] 100 %  BP: ()/(46-67) 136/67       Intake/Output Summary (Last 24 hours) at 4/12/2022 1201  Last data filed at 4/12/2022 0953  Gross per 24 hour   Intake 3631.67 ml   Output --   Net 3631.67 ml       Physical Exam  HENT:       Head: Normocephalic.   Cardiovascular:      Pulses: Normal pulses.   Pulmonary:      Effort: Pulmonary effort is normal.   Skin:     General: Skin is warm.   Neurological:      Mental Status: He is alert. Mental status is at baseline. He is disoriented.     Sacral area has area of black and necrotic tissue.  Left heel also with the eschar and right lateral foot also has areas that required debridement    Significant Labs:  CBC:   Recent Labs   Lab 04/11/22  0429   WBC 20.70*   RBC 2.00*   HGB 6.0*   HCT 18.1*   *   MCV 90.5   MCH 30.0   MCHC 33.1     CMP: No results for input(s): GLU, CALCIUM, ALBUMIN, PROT, NA, K, CO2, CL, BUN, CREATININE, ALKPHOS, ALT, AST, BILITOT in the last 48 hours.    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    Hypotension [I95.9] 04/10/2022 Unknown    Tachycardia [R00.0] 04/06/2022 No    Chronic respiratory failure [J96.10] 04/06/2022 Yes    Pressure ulcer of sacral region, unstageable [L89.150]  Yes    Fever [R50.9] 02/26/2022 No    ESRD (end stage renal disease) [N18.6] 02/23/2022 Yes    Open wound of right hand without foreign body [S61.401A]  Unknown    Pressure ulcer of left heel, unstageable [L89.620]  Unknown    Acute blood loss anemia [D62] 12/25/2021 No    Type 2 diabetes mellitus without complication, without long-term current use of insulin [E11.9] 12/25/2021 Yes    Necrotizing fasciitis [M72.6]  Yes    Hypertension [I10] 09/02/2021 Yes      Problems Resolved During this Admission:    Diagnosis Date Noted Date Resolved POA    Vomiting [R11.10] 02/26/2022 03/02/2022 No    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  Risks and benefits explained to the patient's family including risk of bleeding, infection, possible need for additional operations or procedures.  All questions were answered.    Harish Cazares MD  General Surgery  [facility]

## 2022-04-12 NOTE — PLAN OF CARE
PLANS OF CARE ONGOING  Problem: Adult Inpatient Plan of Care  Goal: Plan of Care Review  Outcome: Ongoing, Progressing  Goal: Patient-Specific Goal (Individualized)  Outcome: Ongoing, Progressing  Goal: Absence of Hospital-Acquired Illness or Injury  Outcome: Ongoing, Progressing  Goal: Optimal Comfort and Wellbeing  Outcome: Ongoing, Progressing  Goal: Readiness for Transition of Care  Outcome: Ongoing, Progressing     Problem: Adjustment to Illness (Sepsis/Septic Shock)  Goal: Optimal Coping  Outcome: Ongoing, Progressing     Problem: Bleeding (Sepsis/Septic Shock)  Goal: Absence of Bleeding  Outcome: Ongoing, Progressing     Problem: Glycemic Control Impaired (Sepsis/Septic Shock)  Goal: Blood Glucose Level Within Desired Range  Outcome: Ongoing, Progressing     Problem: Infection Progression (Sepsis/Septic Shock)  Goal: Absence of Infection Signs and Symptoms  Outcome: Ongoing, Progressing     Problem: Nutrition Impaired (Sepsis/Septic Shock)  Goal: Optimal Nutrition Intake  Outcome: Ongoing, Progressing     Problem: Fluid and Electrolyte Imbalance (Acute Kidney Injury/Impairment)  Goal: Fluid and Electrolyte Balance  Outcome: Ongoing, Progressing     Problem: Oral Intake Inadequate (Acute Kidney Injury/Impairment)  Goal: Optimal Nutrition Intake  Outcome: Ongoing, Progressing     Problem: Renal Function Impairment (Acute Kidney Injury/Impairment)  Goal: Effective Renal Function  Outcome: Ongoing, Progressing     Problem: Infection  Goal: Absence of Infection Signs and Symptoms  Outcome: Ongoing, Progressing     Problem: Fall Injury Risk  Goal: Absence of Fall and Fall-Related Injury  Outcome: Ongoing, Progressing     Problem: Communication Impairment (Mechanical Ventilation, Invasive)  Goal: Effective Communication  Outcome: Ongoing, Progressing     Problem: Device-Related Complication Risk (Mechanical Ventilation, Invasive)  Goal: Optimal Device Function  Outcome: Ongoing, Progressing     Problem: Inability to  Wean (Mechanical Ventilation, Invasive)  Goal: Mechanical Ventilation Liberation  Outcome: Ongoing, Progressing     Problem: Nutrition Impairment (Mechanical Ventilation, Invasive)  Goal: Optimal Nutrition Delivery  Outcome: Ongoing, Progressing     Problem: Skin and Tissue Injury (Mechanical Ventilation, Invasive)  Goal: Absence of Device-Related Skin and Tissue Injury  Outcome: Ongoing, Progressing     Problem: Ventilator-Induced Lung Injury (Mechanical Ventilation, Invasive)  Goal: Absence of Ventilator-Induced Lung Injury  Outcome: Ongoing, Progressing     Problem: Communication Impairment (Artificial Airway)  Goal: Effective Communication  Outcome: Ongoing, Progressing     Problem: Device-Related Complication Risk (Artificial Airway)  Goal: Optimal Device Function  Outcome: Ongoing, Progressing     Problem: Skin and Tissue Injury (Artificial Airway)  Goal: Absence of Device-Related Skin or Tissue Injury  Outcome: Ongoing, Progressing     Problem: Skin Injury Risk Increased  Goal: Skin Health and Integrity  Outcome: Ongoing, Progressing     Problem: Device-Related Complication Risk (Hemodialysis)  Goal: Safe, Effective Therapy Delivery  Outcome: Ongoing, Progressing     Problem: Hemodynamic Instability (Hemodialysis)  Goal: Effective Tissue Perfusion  Outcome: Ongoing, Progressing     Problem: Infection (Hemodialysis)  Goal: Absence of Infection Signs and Symptoms  Outcome: Ongoing, Progressing     Problem: Diabetes Comorbidity  Goal: Blood Glucose Level Within Targeted Range  Outcome: Ongoing, Progressing     Problem: Impaired Wound Healing  Goal: Optimal Wound Healing  Outcome: Ongoing, Progressing     Problem: Gas Exchange Impaired  Goal: Optimal Gas Exchange  Outcome: Ongoing, Progressing

## 2022-04-12 NOTE — PROGRESS NOTES
[facility]  Pulmonology  Progress Note    Patient Name: Og Garcia  MRN: [id number]  Admission Date: 12/23/2021  Hospital Length of Stay: 110 days  Code Status: Prior  Attending Provider: Cecil Abernathy DO  Primary Care Provider: Hector Arndt DNP, FNP-C   Principal Problem: Denice gangrene    Subjective:     Interval History: No acute events overnight. The patient is currently resting comfortably. He is currently afebrile and vital signs are stable.    Objective:     Vital Signs (Most Recent):  Temp: 97.8 °F (36.6 °C) (04/12/22 0715)  Pulse: 89 (04/12/22 1215)  Resp: (!) 24 (04/12/22 1215)  BP: 136/67 (04/12/22 1132)  SpO2: 100 % (04/12/22 1215)   Vital Signs (24h Range):  Temp:  [97.5 °F (36.4 °C)-100.1 °F (37.8 °C)] 97.8 °F (36.6 °C)  Pulse:  [] 89  Resp:  [13-30] 24  SpO2:  [99 %-100 %] 100 %  BP: ()/(46-67) 136/67     Weight: 75.8 kg (167 lb 1.7 oz)  Body mass index is 23.31 kg/m².      Intake/Output Summary (Last 24 hours) at 4/12/2022 1229  Last data filed at 4/12/2022 0953  Gross per 24 hour   Intake 3281.67 ml   Output --   Net 3281.67 ml         Physical Exam  Vitals reviewed.   Constitutional:       General: He is not in acute distress.     Appearance: He is ill-appearing.      Comments: Chronically ill appearing   HENT:      Right Ear: External ear normal.      Left Ear: External ear normal.      Mouth/Throat:      Mouth: Mucous membranes are moist.   Eyes:      Pupils: Pupils are equal, round, and reactive to light.   Cardiovascular:      Rate and Rhythm: Regular rhythm. Tachycardia present.   Pulmonary:      Breath sounds: Normal breath sounds. No wheezing, rhonchi or rales.   Abdominal:      Palpations: Abdomen is soft.   Musculoskeletal:         General: Normal range of motion.      Cervical back: Normal range of motion and neck supple.   Skin:     General: Skin is warm and dry.      Capillary Refill: Capillary refill takes less than 2 seconds.    Neurological:      Mental Status: He is alert. Mental status is at baseline.   Psychiatric:         Mood and Affect: Mood normal.       Vents:  Vent Mode: CPAP/PSV (04/12/22 1215)  Ventilator Initiated: Yes (04/10/22 0343)  Set Rate: 0 BPM (04/12/22 1215)  Vt Set: 0 mL (04/12/22 1215)  Pressure Support: 10 cmH20 (04/11/22 2205)  PEEP/CPAP: 5 cmH20 (04/12/22 1215)  Oxygen Concentration (%): 35 (04/12/22 1215)  Peak Airway Pressure: 16 cmH2O (04/12/22 1215)  Plateau Pressure: 20 cmH20 (03/07/22 0500)  Total Ve: 9.4 mL (04/12/22 1215)  F/VT Ratio<105 (RSBI): (!) 64.86 (04/12/22 1215)    Lines/Drains/Airways       Central Venous Catheter Line  Duration                  Hemodialysis Catheter 12/30/21 0900 right internal jugular 103 days              Drain  Duration                  Colostomy 12/18/21 1030 Descending/sigmoid  days         Gastrostomy/Enterostomy 03/09/22 1436 Percutaneous endoscopic gastrostomy (PEG) midline feeding 33 days              Airway  Duration                  Surgical Airway 01/04/22 Shiley 98 days              Peripheral Intravenous Line  Duration                  Peripheral IV - Single Lumen 04/04/22 1430 22 G Posterior;Right Hand 7 days         Peripheral IV - Single Lumen 04/10/22 1046 22 G Left;Posterior Hand 2 days         Peripheral IV - Single Lumen 04/11/22 1623 18 G Anterior;Proximal;Right Forearm <1 day                    Significant Labs:    CBC/Anemia Profile:  Recent Labs   Lab 04/11/22  0429   WBC 20.70*   HGB 6.0*   HCT 18.1*   *   MCV 90.5   RDW 15.4*          Chemistries:  No results for input(s): NA, K, CL, CO2, BUN, CREATININE, CALCIUM, ALBUMIN, PROT, BILITOT, ALKPHOS, ALT, AST, GLUCOSE, MG, PHOS in the last 48 hours.      All pertinent labs within the past 24 hours have been reviewed.    Significant Imaging:  I have reviewed all pertinent imaging results/findings within the past 24 hours.    Assessment/Plan:     * Denice gangrene  - s/p multiple  debridements  - wound vac in place  - ID consulted for antibiotic management: He was treated with rocephin, daptomycin, clindamycin. 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 Cazares  - 2/21 back to OR for skin grafting  - 3/9 had peg tube placed   Currently stable with no acute issues      Hypotension  Treated with 2 LNS overnight - febrile this morning - concern for sepsis - add MRSA coverage - pan culture - levophed for MAP >60   4/11- currently not on pressors but scheduled for HD today so may need it     Chronic respiratory failure  Was doing trach collar over the weekend but had to be placed back on the vent earlier this week   - will treat his pain- hopefully this will improve his HR and then start back on weaning trails   4/7- aspirated yesterday - febrile this morning. Will start Clindamycin today -   4/8- clinically looks better- will resume PSV today as tolerated   4/9- increased respiratory rate and tachycardic on exam.  Patient had just been turned.  This is all likely secondary to pain.  Will treat his pain and then increase his CPAP to 4 hours t.i.d. as tolerated.  4/11 -continue PSV as tolerated     Tachycardia  Treated anemia   - concern this may be releated to pain and pain medication withdraw - his fentanyl patch was discontinued over the weekend which he has been on for weeks.  -plan - will start PO pain medication q8hrs then decrease to q12 hours after 7 days.   4/8 - improving   4/9-heart rate up on exam.  Secondary to pain.  Will treat p.r.n. pain medication.    Pressure ulcer of sacral region, unstageable  Culture and sensitivity reviewed -now on vancomycin and zosyn   - wound care NP to evaluate to  see if he may need debridement   4/12- patient to go back to OR tomorrow    Fever  2/28 - resp culture with pseudomonas and klebsiella  treat with zosyn for 7 days   Blood culture from 2/25 also growing Pseudomonas. Sensitive to zosyn. Will need to continue zosyn for at least 10 days total  - low grade fever since 3/28, CXR- unchanged and repeat CBC- WBC 20.57  - removed malpositioned LUE PICC.  - blood cultures- NGTD after 72  Hours   -4/6-  Temp 101 at noon yesterday- wound cultures with heavy growth gram neg bacilli - will go ahead and add Rocephin and continue to follow cultures    4/7- febrile this morning - added clinda given his aspiration event   4/8 - afebrile- would cultures reviewed - sensitive to Rocephin   4/9-  wound culture sensitivity has returned; escalating to Zosyn specially given his fever overnight.  4/10--- febrile - TMAx 103.1 - will add MRSA coverage - pan culture   4/11- Tmax 99.9 after adding MRSA coverage   4/12- no fever in last 24 hours    ESRD (end stage renal disease)  Started on HD 12/30   Continue with HD per nephrology      Type 2 diabetes mellitus without complication, without long-term current use of insulin  Continue with current care  accuchecks are ok    Acute blood loss anemia  No active bleeding  Hgb 6.0 on 4/11  Patient transfused 1 unit with dialysis yesterday    Necrotizing fasciitis  S/p multiple surgeries     Hypertension  BP currently ok                   Cecil Abernathy, DO  Pulmonology  [facility]

## 2022-04-12 NOTE — PROGRESS NOTES
"[facility]  Adult Nutrition  Follow up           Skin Integrity  Clayton Risk Assessment  Sensory Perception: 3-->slightly limited  Moisture: 3-->occasionally moist  Activity: 1-->bedfast  Mobility: 2-->very limited  Nutrition: 2-->probably inadequate  Friction and Shear: 2-->potential problem  Clayton Score: 13  Comments on skin integrity: wounds  Nutrition Diagnosis  Changes in Nutrition diagnosis status:   Increased Protein Needs   related to Decreased/ impaired skin integrity as evidenced by wounds    Nutrition Diagnosis Status: Improving      Nutrition Risk  Level of Risk/Frequency of Follow-up: high  Comments on nutrition risk:    Chewing or Swallowing Difficulty?: Swallowing difficulty  Recent Labs   Lab 04/12/22  0553   POCGLU 111*     Comments on Glucose:   Lab/Procedures/Meds  No results for input(s): NA, K, BUN, CREATININE, CALCIUM, ALBUMIN, CL, ALT, AST, PHOS in the last 72 hours.  Last A1c: No results found for: HGBA1C  Lab Results   Component Value Date    RBC 2.00 (L) 04/11/2022    HGB 6.0 (L) 04/11/2022    HCT 18.1 (L) 04/11/2022    MCV 90.5 04/11/2022    MCH 30.0 04/11/2022    MCHC 33.1 04/11/2022     Pertinent Labs Reviewed: reviewed  Pertinent Labs Comments: Hct 18.1, Na 133, Cl 95, BUN 36, Creat 1.49, Alb 1.3  Pertinent Medications Reviewed: reviewed  Pertinent Medications Comments: heparin, insulin  Estimated/ Assessed Needs/ Anthropometrics  Temp: 99.1 °F (37.3 °C)  Height Method: Stated  Height: 5' 11" (180.3 cm)  Height (inches): 71 in  Weight Method: Bed Scale  Weight: 75.8 kg (167 lb 1.7 oz)  Weight (lb): 167.11 lb  Ideal Body Weight (IBW), Male: 172 lb  % Ideal Body Weight, Male (lb): 106.77 %  BMI (Calculated): 23.3  BMI Grade: 25 - 29.9 - overweight     RMR ([place]-St. Jeor Equation): 1560.13 Activity Factor: 1 Injury Factor: 1.2   Total Ve: 6.6 mL Temp: 99.1 °F (37.3 °C)Oral  Weight Used For Calorie Calculations: 75.3 kg (166 lb 0.1 oz)   Energy Need Method: [place] " State Energy Calorie Requirements (kcal): 2105  Weight Used For Protein Calculations: 83.3 kg (183 lb 10.3 oz)  Protein Requirements: 100-125  Estimated Fluid Requirement Method: RDA Method Fluid Requirements (mL): 2105  RDA Method (mL): 2105     Nutrition by Nursing  Diet/Nutrition Received: tube feeding  Intake (%): 100%  Diet/Feeding Assistance: total feed  Diet/Feeding Tolerance: other (see comments) (tf keaton well)  Last Bowel Movement: 04/11/22  [REMOVED]      NG/OG Tube Echo sump 18 Fr. Left nostril-Feeding Type: continuous, by pump       Gastrostomy/Enterostomy 03/09/22 1436 Percutaneous endoscopic gastrostomy (PEG) midline feeding-Feeding Type: continuous  [REMOVED]      NG/OG Tube Echo sump 18 Fr. Left nostril-Current Rate (mL/hr): 50 mL/hr       Gastrostomy/Enterostomy 03/09/22 1436 Percutaneous endoscopic gastrostomy (PEG) midline feeding-Current Rate (mL/hr): 60 mL/hr  [REMOVED]      NG/OG Tube Echo sump 18 Fr. Left nostril-Goal Rate (mL/hr): 75 mL/hr       Gastrostomy/Enterostomy 03/09/22 1436 Percutaneous endoscopic gastrostomy (PEG) midline feeding-Goal Rate (mL/hr): 60 mL/hr  [REMOVED]      NG/OG Tube Echo sump 18 Fr. Left nostril-Formula Name: nepro 1.8       Gastrostomy/Enterostomy 03/09/22 1436 Percutaneous endoscopic gastrostomy (PEG) midline feeding-Formula Name: NEPRO  Monitor and Evaluation  % current Intake: Enteral Nutrition at goal  % intake to meet estimated needs: Enteral Nutrition   Food and Nutrient Intake: enteral nutrition intake  Food and Nutrient Adminstration: enteral and parenteral nutrition administration  Anthropometric Measurements: height/length, body mass index, weight, weight change  Biochemical Data, Medical Tests and Procedures: electrolyte and renal panel, glucose/endocrine profile  Nutrition-Focused Physical Findings: skin  Enteral Calories (kcal): 2592  Enteral Protein (gm): 115  Enteral (Free Water) Fluid (mL): 1037  Free Water Flush Fluid (mL): 300  Parenteral  Calories (kcal): 845  Parenteral Protein (gm): 48  Parenteral Fluid (mL): 960  Lipid Calories (kcals): 500 kcals  Other Calories (kcal): 528 (propofol)  Total Calories (kcal): 2160  Total Calories (kcal/kg): 2612  % Kcal Needs: 100  Total Protein (gm): 135  % Protein Needs: 100  IV Fluid (mL): 1764  Total Fluid Intake (mL): 1337  Energy Calories Required: meeting needs  Protein Required: meeting needs  Fluid Required: meeting needs  Tolerance: tolerating  Nutrition Prescription  Recommendation/Intervention: Continue PEG tube feeding: Nepro @ 60ml/hr; H20 flush of 50ml q 4hr  Goals: Pt will meet estimated nutritional needs; wound healing, TF tolerance, wt maintenance  Nutrition Goal Status: progressing towards goal  Communication of RD Recs: reviewed with RN  Current Diet Order: PEG tube feeding: Nepro @ 60ml/hr; H20 flush of 50ml q 4hr  Nutrition Order Comments: PEG tube feeding: Nepro @ 60ml/hr; H20 flush of 100ml q 4hr  Current Nutrition Support Formula Ordered: Nepro  Current Nutrition Support Rate Ordered: 60 (ml)  Current Nutrition Support Frequency Ordered: hourly  Recommended Diet: Enteral Nutrition PEG tube feeding: Nepro @ 60ml/hr; H20 flush of 50ml q 4hr  Recommended Oral Supplement: No Oral Supplements  Nutrition/Diet History  Spiritual, Cultural Beliefs, [employer] Practices, Values that Affect Care: no  Food Allergies: NKFA  Factors Affecting Nutritional Intake: None identified at this time  Nutrition Follow-Up  RD Follow-up?: Yes  Assessment and Plan  No new Assessment & Plan notes have been filed under this hospital service since the last note was generated.  Service: Nutrition

## 2022-04-13 NOTE — PROGRESS NOTES
[facility]  Nephrology  Progress Note    Patient Name: Og Garcia  MRN: [id number]  Admission Date: 12/23/2021  Hospital Length of Stay: 111 days  Attending Provider: Cecil Abernathy DO   Primary Care Physician: Hector Arndt DNP, FNP-C  Principal Problem:Denice gangrene    Subjective:     HPI: The patient is known from the previous nephrology consult at [facility].  He is no at Specialty and continues to need dialysis support.      Interval History: The patient is now s/p wound debridement.  He tolerated the procedure.  No other changes.    Review of patient's allergies indicates:  No Known Allergies  Current Facility-Administered Medications   Medication Frequency    0.9%  NaCl infusion (for blood administration) Q24H PRN    0.9%  NaCl infusion PRN    acetaminophen tablet 500 mg Q6H PRN    acetic acid 0.25 % irrigation PRN    albuterol nebulizer solution 2.5 mg Q4H PRN    albuterol-ipratropium 2.5 mg-0.5 mg/3 mL nebulizer solution 3 mL Q6H    bisacodyL EC tablet 10 mg Daily PRN    calcium gluconate 1 g in dextrose 5 % in water (D5W) 5 % 100 mL IVPB (MB+) Once    dextrose 50% injection 12.5 g PRN    diltiaZEM tablet 90 mg Q6H    glucagon (human recombinant) injection 1 mg PRN    guaiFENesin 100 mg/5 ml syrup 200 mg Q6H    heparin (porcine) injection 4,000 Units PRN    heparin (porcine) injection 5,000 Units Q8H    HYDROcodone-acetaminophen 5-325 mg per tablet 1 tablet Q8H    Followed by    HYDROcodone-acetaminophen 5-325 mg per tablet 1 tablet Q12H    insulin aspart U-100 injection 1-10 Units Q6H    insulin detemir U-100 injection 25 Units QHS    NORepinephrine 4 mg in dextrose 5 % 250 mL infusion Continuous    ondansetron injection 8 mg Q6H PRN    pantoprazole suspension 40 mg Daily    piperacillin-tazobactam (ZOSYN) 4.5 g in dextrose 5 % in water (D5W) 5 % 100 mL IVPB (MB+) Q12H    predniSONE tablet 5 mg Daily    sevelamer carbonate pwpk 0.8 g TID WM     silver sulfADIAZINE 1% cream Daily PRN    simethicone chewable tablet 80 mg TID PRN    vancomycin - pharmacy to dose pharmacy to manage frequency       Objective:     Vital Signs (Most Recent):  Temp: 98.5 °F (36.9 °C) (04/13/22 0800)  Pulse: 98 (04/13/22 0915)  Resp: 12 (04/13/22 0915)  BP: (!) 116/59 (04/13/22 0900)  SpO2: 100 % (04/13/22 0915)  O2 Device (Oxygen Therapy): ventilator (04/13/22 0540)   Vital Signs (24h Range):  Temp:  [96.8 °F (36 °C)-99.2 °F (37.3 °C)] 97.4 °F (36.3 °C)  Pulse:  [] 104  Resp:  [12-37] 27  SpO2:  [99 %-100 %] 100 %  BP: (100-142)/(54-74) 135/68     Weight: 78.6 kg (173 lb 4.5 oz) (04/13/22 0400)  Body mass index is 24.17 kg/m².  Body surface area is 1.98 meters squared.    I/O last 3 completed shifts:  In: 3090 [P.O.:180; I.V.:40; Other:100; NG/GT:2220; IV Piggyback:550]  Out: 200 [Stool:200]    Physical Exam  Vitals reviewed.   HENT:      Head: Atraumatic.   Cardiovascular:      Rate and Rhythm: Normal rate and regular rhythm.   Pulmonary:      Effort: Pulmonary effort is normal.      Comments: ventilated  Abdominal:      General: Abdomen is flat.   Skin:     General: Skin is warm.   Neurological:      Mental Status: He is alert.       Significant Labs:  BMP:   Recent Labs   Lab 04/08/22  1002   GLU 72*   *   K 3.8   CL 95*   CO2 26   BUN 36*   CREATININE 1.49*   CALCIUM 8.2*     CBC:   Recent Labs   Lab 04/13/22  0252   WBC 16.10*   RBC 2.55*   HGB 7.5*   HCT 22.7*   *   MCV 89.0   MCH 29.4   MCHC 33.0        Significant Imaging:  Labs: Reviewed    Assessment/Plan:     ESRD (end stage renal disease)  Dialysis MWF  Continue with scheduled hemodialysis  4/12/2022  Dialysis sessions are going well.  4/13/2022 Continue with scheduled hemodialysis for this patient.        Thank you for your consult. I will follow-up with patient. Please contact us if you have any additional questions.    Edwin Pryor Jr, MD  Nephrology  [facility]

## 2022-04-13 NOTE — ANESTHESIA PREPROCEDURE EVALUATION
"                                                                                                             04/13/2022  Og Garcia is a 66 y.o., male.      Pre-op Assessment    I have reviewed the Patient Summary Reports.    I have reviewed the NPO Status.   I have reviewed the Medications.     Review of Systems         Anesthesia Plan  Type of Anesthesia, risks & benefits discussed:    Anesthesia Type: Gen ETT  Intra-op Monitoring Plan: Standard ASA Monitors  Post Op Pain Control Plan: IV/PO Opioids PRN  Induction:  IV  Informed Consent: Informed consent signed with the Patient and all parties understand the risks and agree with anesthesia plan.  All questions answered.   ASA Score: 4    Ready For Surgery From Anesthesia Perspective.     .  NPO greater than 8 hours  NAC  NKDA     Hct 23  K 3.8  Cr 1.5  Ca 8.2  12/17/21 EKG: Sinus rhythm 94 bpm;   RBBB with left anterior fascicular block   Inferior infarct - age undetermined  1/10/22 Echo: trace MR; EF 50%; pulmonary HTN  1/5/22 CXR: "Mild improvement of pulmonary density."     HTN  Diabetes mellitus  H/o CVA  HTN  Respiratory failure . . . Trached/ventilated  H/o Denice's gangrene . . . S/p multiple debridements  ESRD  Chronic anemia   Hypocalcemia       Airway exam deferred (trached)       "

## 2022-04-13 NOTE — NURSING
Patient back from surgery. Dressing visualized dry and intact no distress noted. Patient on assist control. Lungs coarse bowel sounds active. Radial and pedal pulse palpable. Patient opens eyes to voice. Bed in lowest position side rails up x3.

## 2022-04-13 NOTE — TRANSFER OF CARE
Anesthesia Transfer of Care Note    Patient: [facility]    Procedure(s) Performed: Procedure(s) (LRB):  DEBRIDEMENT, WOUND, SACRUM (N/A)  DEBRIDEMENT, LOWER EXTREMITY (Bilateral)    Patient location: Other: TRAV    Anesthesia Type: general    Transport from OR: Upon arrival to PACU/ICU, patient attached to ventilator and auscultated to confirm bilateral breath sounds and adequate TV. Transported from OR intubated on 100% O2 by AMBU with adequate controlled ventilation. Continuous SpO2 monitoring in transport. Continuous ECG monitoring in transport    Post pain: adequate analgesia    Post assessment: no apparent anesthetic complications    Post vital signs: stable    Level of consciousness: sedated    Nausea/Vomiting: no nausea/vomiting    Complications: none    Transfer of care protocol was followedComments: Returned to place of acquisition, connected to Vent via in place tracheostomy on previous settings, report to RT and receiving RN.  VSS, NAD noted.  Offered time and opportunity for any questions.        Last vitals:   Visit Vitals  BP (!) 142/74 (BP Location: Left arm, Patient Position: Lying)   Pulse 104   Temp 36.3 °C (97.4 °F) (Oral)   Resp (!) 37   SpO2 100%

## 2022-04-13 NOTE — OP NOTE
[facility] - Periop Services  Surgery Department  Operative Note    SUMMARY     Date of Procedure: 4/13/2022     Procedure: Procedure(s) (LRB):  DEBRIDEMENT, WOUND, SACRUM (N/A)  DEBRIDEMENT, LOWER EXTREMITY (Bilateral)     Surgeon(s) and Role:     * Harish Cazares MD - Primary    Assisting Surgeon: None    Pre-Operative Diagnosis: Necrotizing fasciitis [M72.6]    Post-Operative Diagnosis: Post-Op Diagnosis Codes:     * Necrotizing fasciitis [M72.6]    Anesthesia: General    Procedures Performed:  Debridement of sacrum down to the bone level.  Debridement of left heel down to the bone level.  And debridement of right lower extremity    Significant Findings of the Procedure:  Large stage IV ulcers on the patient's sacral area bilateral lower extremities    Procedure in Detail:  After informed consent patient brought to the OR placed in the left lateral decubitus position.  Started off on the patient's right lower extremity he had 3 areas on the lateral aspect of his right lower leg had thick eschar and necrotic tissues.  The unroof these areas to show how deep and debride the underlying necrotic tissue as well.  On lateral part of his calf area was a 8 x 3.5 x 0.5 cm area that was full-thickness down to the muscles and we debrided this area.  Will lower the malleolus went down to his bone level and this was a 4 x 3 x 1 cm area.  On the lateral aspect was 5th toe with a 5 x 3 x 1 cm area that went down to the bone level debrided that as well.  Left heel had a 6 x 7 x 1 cm area that involved the calcaneus.  Debrided this area as well.  All these areas were debrided down to glue good he tissues although there was not a great amount of bleeding appreciated at the sites.  Lastly we turned our attention to the sacral region patient had a very large necrotic full-thickness eschar on the area we debrided this 14 x 14 x 4 cm area including down to the sacrum.  We took some cultures of the sacrum and sent some biopsies  for permanent to make sure there is osteomyelitis encased patient warrants additional treatments.  We then ensured hemostasis placed a wet to dry these areas and dressed them.  Patient tolerated the procedure well.    Complications: No    Estimated Blood Loss (EBL): 75 mL           Implants: * No implants in log *    Specimens:   Specimen (24h ago, onward)            Orders from this encounter     Start     Ordered    04/13/22 1046  Surgical Pathology  RELEASE UPON ORDERING         04/13/22 1046          Orders from suspended admission ([facility] - Cecil Abernathy DO)     None                        Condition: Good    Disposition: PACU - hemodynamically stable.    Attestation: I was present and scrubbed for the entire procedure.

## 2022-04-13 NOTE — OR NURSING
1105 Pt arrived from OR. Dressing dry intact. VSS. 0 pain.     1125 Pt Handoff given to Speciality RN

## 2022-04-13 NOTE — NURSING
Patient in bed resting on ventilator. No distress noted. Bowel sounds active. Patient repositioned in bed sacral and bilateral feet dressings dry and intact. Vitals stable. Bed in lowest position side rails up x3. Bed exit alarm on. Will report to pm shift

## 2022-04-13 NOTE — SUBJECTIVE & OBJECTIVE
Interval History: The patient is now s/p wound debridement.  He tolerated the procedure.  No other changes.    Review of patient's allergies indicates:  No Known Allergies  Current Facility-Administered Medications   Medication Frequency    0.9%  NaCl infusion (for blood administration) Q24H PRN    0.9%  NaCl infusion PRN    acetaminophen tablet 500 mg Q6H PRN    acetic acid 0.25 % irrigation PRN    albuterol nebulizer solution 2.5 mg Q4H PRN    albuterol-ipratropium 2.5 mg-0.5 mg/3 mL nebulizer solution 3 mL Q6H    bisacodyL EC tablet 10 mg Daily PRN    calcium gluconate 1 g in dextrose 5 % in water (D5W) 5 % 100 mL IVPB (MB+) Once    dextrose 50% injection 12.5 g PRN    diltiaZEM tablet 90 mg Q6H    glucagon (human recombinant) injection 1 mg PRN    guaiFENesin 100 mg/5 ml syrup 200 mg Q6H    heparin (porcine) injection 4,000 Units PRN    heparin (porcine) injection 5,000 Units Q8H    HYDROcodone-acetaminophen 5-325 mg per tablet 1 tablet Q8H    Followed by    HYDROcodone-acetaminophen 5-325 mg per tablet 1 tablet Q12H    insulin aspart U-100 injection 1-10 Units Q6H    insulin detemir U-100 injection 25 Units QHS    NORepinephrine 4 mg in dextrose 5 % 250 mL infusion Continuous    ondansetron injection 8 mg Q6H PRN    pantoprazole suspension 40 mg Daily    piperacillin-tazobactam (ZOSYN) 4.5 g in dextrose 5 % in water (D5W) 5 % 100 mL IVPB (MB+) Q12H    predniSONE tablet 5 mg Daily    sevelamer carbonate pwpk 0.8 g TID WM    silver sulfADIAZINE 1% cream Daily PRN    simethicone chewable tablet 80 mg TID PRN    vancomycin - pharmacy to dose pharmacy to manage frequency       Objective:     Vital Signs (Most Recent):  Temp: 98.5 °F (36.9 °C) (04/13/22 0800)  Pulse: 98 (04/13/22 0915)  Resp: 12 (04/13/22 0915)  BP: (!) 116/59 (04/13/22 0900)  SpO2: 100 % (04/13/22 0915)  O2 Device (Oxygen Therapy): ventilator (04/13/22 0540)   Vital Signs (24h Range):  Temp:  [96.8 °F (36 °C)-99.2 °F (37.3 °C)] 97.4 °F (36.3  °C)  Pulse:  [] 104  Resp:  [12-37] 27  SpO2:  [99 %-100 %] 100 %  BP: (100-142)/(54-74) 135/68     Weight: 78.6 kg (173 lb 4.5 oz) (04/13/22 0400)  Body mass index is 24.17 kg/m².  Body surface area is 1.98 meters squared.    I/O last 3 completed shifts:  In: 3090 [P.O.:180; I.V.:40; Other:100; NG/GT:2220; IV Piggyback:550]  Out: 200 [Stool:200]    Physical Exam  Vitals reviewed.   HENT:      Head: Atraumatic.   Cardiovascular:      Rate and Rhythm: Normal rate and regular rhythm.   Pulmonary:      Effort: Pulmonary effort is normal.      Comments: ventilated  Abdominal:      General: Abdomen is flat.   Skin:     General: Skin is warm.   Neurological:      Mental Status: He is alert.       Significant Labs:  BMP:   Recent Labs   Lab 04/08/22  1002   GLU 72*   *   K 3.8   CL 95*   CO2 26   BUN 36*   CREATININE 1.49*   CALCIUM 8.2*     CBC:   Recent Labs   Lab 04/13/22  0252   WBC 16.10*   RBC 2.55*   HGB 7.5*   HCT 22.7*   *   MCV 89.0   MCH 29.4   MCHC 33.0        Significant Imaging:  Labs: Reviewed

## 2022-04-13 NOTE — INTERVAL H&P NOTE
The patient has been examined and the H&P has been reviewed:    I concur with the findings and no changes have occurred since H&P was written.        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

## 2022-04-13 NOTE — SUBJECTIVE & OBJECTIVE
Interval History: NPO for sacral debridement this morning. Awake, alert on exam.     Objective:     Vital Signs (Most Recent):  Temp: 98.5 °F (36.9 °C) (04/13/22 0800)  Pulse: 98 (04/13/22 0915)  Resp: 12 (04/13/22 0915)  BP: (!) 116/59 (04/13/22 0900)  SpO2: 100 % (04/13/22 0915)   Vital Signs (24h Range):  Temp:  [96.8 °F (36 °C)-99.2 °F (37.3 °C)] 97.4 °F (36.3 °C)  Pulse:  [] 102  Resp:  [12-37] 25  SpO2:  [99 %-100 %] 100 %  BP: (100-142)/(54-74) 125/69     Weight: 78.6 kg (173 lb 4.5 oz)  Body mass index is 24.17 kg/m².      Intake/Output Summary (Last 24 hours) at 4/13/2022 1141  Last data filed at 4/13/2022 1059  Gross per 24 hour   Intake 2005 ml   Output 275 ml   Net 1730 ml       Physical Exam  Vitals reviewed.   Constitutional:       General: He is not in acute distress.     Appearance: He is ill-appearing.      Comments: Chronically ill appearing   HENT:      Right Ear: External ear normal.      Left Ear: External ear normal.      Mouth/Throat:      Mouth: Mucous membranes are moist.   Eyes:      Pupils: Pupils are equal, round, and reactive to light.   Cardiovascular:      Rate and Rhythm: Normal rate and regular rhythm.   Pulmonary:      Breath sounds: Normal breath sounds. No wheezing, rhonchi or rales.   Abdominal:      Palpations: Abdomen is soft.   Musculoskeletal:         General: Normal range of motion.      Cervical back: Normal range of motion and neck supple.   Skin:     General: Skin is warm and dry.      Capillary Refill: Capillary refill takes less than 2 seconds.   Neurological:      Mental Status: He is alert. Mental status is at baseline.       Vents:  Vent Mode: A/C (04/13/22 0710)  Ventilator Initiated: Yes (04/10/22 0343)  Set Rate: 14 BPM (04/13/22 0710)  Vt Set: 480 mL (04/13/22 0710)  Pressure Support: 10 cmH20 (04/12/22 1942)  PEEP/CPAP: 5 cmH20 (04/13/22 0710)  Oxygen Concentration (%): 35 (04/13/22 0730)  Peak Airway Pressure: 15 cmH2O (04/13/22 0710)  Plateau Pressure:  20 cmH20 (03/07/22 0500)  Total Ve: 7.6 mL (04/13/22 0710)  F/VT Ratio<105 (RSBI): (!) 69.36 (04/13/22 0540)    Lines/Drains/Airways       Central Venous Catheter Line  Duration                  Hemodialysis Catheter 12/30/21 0900 right internal jugular 104 days              Drain  Duration                  Colostomy 12/18/21 1030 Descending/sigmoid  days         Gastrostomy/Enterostomy 03/09/22 1436 Percutaneous endoscopic gastrostomy (PEG) midline feeding 34 days              Airway  Duration                  Surgical Airway 01/04/22 Shiley 99 days              Peripheral Intravenous Line  Duration                  Peripheral IV - Single Lumen 04/04/22 1430 22 G Posterior;Right Hand 8 days         Peripheral IV - Single Lumen 04/11/22 1623 18 G Anterior;Proximal;Right Forearm 1 day                    Significant Labs:    CBC/Anemia Profile:  Recent Labs   Lab 04/13/22  0252   WBC 16.10*   HGB 7.5*   HCT 22.7*   *   MCV 89.0   RDW 15.5*        Chemistries:  No results for input(s): NA, K, CL, CO2, BUN, CREATININE, CALCIUM, ALBUMIN, PROT, BILITOT, ALKPHOS, ALT, AST, GLUCOSE, MG, PHOS in the last 48 hours.    All pertinent labs within the past 24 hours have been reviewed.    Significant Imaging:  I have reviewed all pertinent imaging results/findings within the past 24 hours.

## 2022-04-13 NOTE — PROGRESS NOTES
Placed patient back on the vent via ACV with previous settings for rest.  Patient did 4 hours of CPAP 5, PSV 10, FIO2 35% and did fine with no issues.

## 2022-04-13 NOTE — PROGRESS NOTES
[facility]  Pulmonology  Progress Note    Patient Name: Og aGrcia  MRN: [id number]  Admission Date: 12/23/2021  Hospital Length of Stay: 111 days  Code Status: Prior  Attending Provider: Cecil Abernathy DO  Primary Care Provider: Hector Arndt DNP, FNP-C   Principal Problem: Denice gangrene    Subjective:     Interval History: NPO for sacral debridement this morning. Awake, alert on exam.     Objective:     Vital Signs (Most Recent):  Temp: 98.5 °F (36.9 °C) (04/13/22 0800)  Pulse: 98 (04/13/22 0915)  Resp: 12 (04/13/22 0915)  BP: (!) 116/59 (04/13/22 0900)  SpO2: 100 % (04/13/22 0915)   Vital Signs (24h Range):  Temp:  [96.8 °F (36 °C)-99.2 °F (37.3 °C)] 97.4 °F (36.3 °C)  Pulse:  [] 102  Resp:  [12-37] 25  SpO2:  [99 %-100 %] 100 %  BP: (100-142)/(54-74) 125/69     Weight: 78.6 kg (173 lb 4.5 oz)  Body mass index is 24.17 kg/m².      Intake/Output Summary (Last 24 hours) at 4/13/2022 1141  Last data filed at 4/13/2022 1059  Gross per 24 hour   Intake 2005 ml   Output 275 ml   Net 1730 ml       Physical Exam  Vitals reviewed.   Constitutional:       General: He is not in acute distress.     Appearance: He is ill-appearing.      Comments: Chronically ill appearing   HENT:      Right Ear: External ear normal.      Left Ear: External ear normal.      Mouth/Throat:      Mouth: Mucous membranes are moist.   Eyes:      Pupils: Pupils are equal, round, and reactive to light.   Cardiovascular:      Rate and Rhythm: Normal rate and regular rhythm.   Pulmonary:      Breath sounds: Normal breath sounds. No wheezing, rhonchi or rales.   Abdominal:      Palpations: Abdomen is soft.   Musculoskeletal:         General: Normal range of motion.      Cervical back: Normal range of motion and neck supple.   Skin:     General: Skin is warm and dry.      Capillary Refill: Capillary refill takes less than 2 seconds.   Neurological:      Mental Status: He is alert. Mental status is at  baseline.       Vents:  Vent Mode: A/C (04/13/22 0710)  Ventilator Initiated: Yes (04/10/22 0343)  Set Rate: 14 BPM (04/13/22 0710)  Vt Set: 480 mL (04/13/22 0710)  Pressure Support: 10 cmH20 (04/12/22 1942)  PEEP/CPAP: 5 cmH20 (04/13/22 0710)  Oxygen Concentration (%): 35 (04/13/22 0730)  Peak Airway Pressure: 15 cmH2O (04/13/22 0710)  Plateau Pressure: 20 cmH20 (03/07/22 0500)  Total Ve: 7.6 mL (04/13/22 0710)  F/VT Ratio<105 (RSBI): (!) 69.36 (04/13/22 0540)    Lines/Drains/Airways       Central Venous Catheter Line  Duration                  Hemodialysis Catheter 12/30/21 0900 right internal jugular 104 days              Drain  Duration                  Colostomy 12/18/21 1030 Descending/sigmoid  days         Gastrostomy/Enterostomy 03/09/22 1436 Percutaneous endoscopic gastrostomy (PEG) midline feeding 34 days              Airway  Duration                  Surgical Airway 01/04/22 Shiley 99 days              Peripheral Intravenous Line  Duration                  Peripheral IV - Single Lumen 04/04/22 1430 22 G Posterior;Right Hand 8 days         Peripheral IV - Single Lumen 04/11/22 1623 18 G Anterior;Proximal;Right Forearm 1 day                    Significant Labs:    CBC/Anemia Profile:  Recent Labs   Lab 04/13/22  0252   WBC 16.10*   HGB 7.5*   HCT 22.7*   *   MCV 89.0   RDW 15.5*        Chemistries:  No results for input(s): NA, K, CL, CO2, BUN, CREATININE, CALCIUM, ALBUMIN, PROT, BILITOT, ALKPHOS, ALT, AST, GLUCOSE, MG, PHOS in the last 48 hours.    All pertinent labs within the past 24 hours have been reviewed.    Significant Imaging:  I have reviewed all pertinent imaging results/findings within the past 24 hours.    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 Will start Clindamycin today -   4/8- clinically looks better- will resume PSV today as tolerated   4/9- increased respiratory rate and tachycardic on exam.  Patient had just been turned.  This is all likely secondary to pain.  Will treat his pain and then increase his CPAP to 4 hours t.i.d. as tolerated.  4/13- to OR today  Then  -continue PSV as tolerated     Pressure ulcer of sacral region, unstageable  Culture and sensitivity reviewed -now on vancomycin and zosyn   - wound care NP to evaluate to see if he may need debridement   4/13- scheduled for debridement today     ESRD (end stage renal disease)  Started on HD 12/30   Continue with HD per nephrology      Type 2 diabetes mellitus without complication, without long-term current use of insulin  Continue with current care  accuchecks are ok    Acute blood loss anemia  No active bleeding  Hgb 6.0 on 4/11  Patient transfused 1 unit with dialysis yesterday  4/13 -H/H 7.5/22.7    Necrotizing fasciitis  S/p multiple surgeries                  Elinor Baker, FLORESITA-ACNP  Pulmonology  [facility]

## 2022-04-13 NOTE — NURSING
1618: patient  bpm and RR 41 morphine 4 mg IVP given  1626: patient HR now 156 Lopressor 5 mg IVP given  1629: Patients heart rate decreased to 98 bpm and respiration is now 26 Morphine 4 mg and Lopressor 5 mg IVP successful.

## 2022-04-13 NOTE — ASSESSMENT & PLAN NOTE
Dialysis MWF  Continue with scheduled hemodialysis  4/12/2022  Dialysis sessions are going well.  4/13/2022 Continue with scheduled hemodialysis for this patient.

## 2022-04-13 NOTE — ASSESSMENT & PLAN NOTE
No active bleeding  Hgb 6.0 on 4/11  Patient transfused 1 unit with dialysis yesterday  4/13 -H/H 7.5/22.7

## 2022-04-13 NOTE — OR NURSING
1105 Pt arrived from OR. Dressing dry and intact. VSS. No Pain noted.     1125 Pt handoff given to Brenna Spencer RN.  /69 RR25 O2 sat 100% Vent settings AC Rate 14 480 title volume Peep of 5 O2 35%.

## 2022-04-13 NOTE — H&P
Patient to go to OR today for debridement of sacral region and BLE risks and benefits explained to patient's family All questions answered

## 2022-04-13 NOTE — NURSING
accucheck done bs 53.. pt asymtomatic.. dextrose 50 % 12.5 g given iv for hypoglycemia .. will monitor    0633: DR. MENCHACA INFORMED  VIA SECURE CHAT ABOUT TODAYS H & H ( 7.5 & 22.7  NO ORDERS RECEIVED AT THIS TIME     0635 : ACCUCHECK REPEATED  .. PT WITHOUT NOTED DISTRESS REMAINS NPO .. SAFETY MEASURES ONGOING

## 2022-04-13 NOTE — ASSESSMENT & PLAN NOTE
Culture and sensitivity reviewed -now on vancomycin and zosyn   - wound care NP to evaluate to see if he may need debridement   4/13- scheduled for debridement today

## 2022-04-13 NOTE — ASSESSMENT & PLAN NOTE
Was doing trach collar over the weekend but had to be placed back on the vent earlier this week   - will treat his pain- hopefully this will improve his HR and then start back on weaning trails   4/7- aspirated yesterday - febrile this morning. Will start Clindamycin today -   4/8- clinically looks better- will resume PSV today as tolerated   4/9- increased respiratory rate and tachycardic on exam.  Patient had just been turned.  This is all likely secondary to pain.  Will treat his pain and then increase his CPAP to 4 hours t.i.d. as tolerated.  4/13- to OR today  Then  -continue PSV as tolerated

## 2022-04-13 NOTE — ASSESSMENT & PLAN NOTE
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

## 2022-04-13 NOTE — NURSING
At bedrest opens eyes to verbal stimuli  Pt does not follow any commands . Pt resting on cpap ventilation at this time appear to be tolerating .. has trach in place .. iv sites patent to right forearm and right hand .. assessed .. llq colostomy in place with liquid brown stool in bag  drsgs patent to sacral area and bilateral foot .. total care ongoing .. will continue to monitor

## 2022-04-13 NOTE — ANESTHESIA POSTPROCEDURE EVALUATION
Anesthesia Post Evaluation    Patient: Og [facility]    Procedure(s) Performed: Procedure(s) (LRB):  DEBRIDEMENT, WOUND, SACRUM (N/A)  DEBRIDEMENT, LOWER EXTREMITY (Bilateral)    Final Anesthesia Type: general      Patient location during evaluation: PACU  Post-procedure vital signs: reviewed and stable  Pain management: adequate  Airway patency: patent  NAMITA mitigation strategies: Extubation and recovery carried out in lateral, semiupright, or other nonsupine position  PONV status at discharge: No PONV  Anesthetic complications: no      Cardiovascular status: hemodynamically stable  Respiratory status: unassisted  Hydration status: euvolemic  Follow-up not needed.          Vitals Value Taken Time   /63 04/13/22 1315   Temp 36.4 °C (97.5 °F) 04/13/22 1217   Pulse 95 04/13/22 1315   Resp 24 04/13/22 1338   SpO2 100 % 04/13/22 1315         Event Time   Out of Recovery 11:25:00         Pain/Rio Score: Pain Rating Prior to Med Admin: 7 (4/13/2022  1:38 PM)  Pain Rating Post Med Admin: 0 (4/12/2022 10:53 PM)  Rio Score: 5 (4/13/2022 11:25 AM)

## 2022-04-14 NOTE — PROGRESS NOTES
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 male with wounds to sacral, left lateral lower leg, right anterior foot, right hand, left posterior heel, and right lateral and medial foot. He is status post debridement day 1. Vashe to all wounds today, start silvadene in AM.     Review of Systems   Unable to perform ROS: Acuity of condition     Objective:     Vital Signs (Most Recent):  Temp: 100.1 °F (37.8 °C) (04/14/22 1200)  Pulse: 104 (04/14/22 1200)  Resp: (!) 33 (04/14/22 1200)  BP: 105/64 (04/14/22 1200)  SpO2: 100 % (04/14/22 1200) Vital Signs (24h Range):  Temp:  [97.6 °F (36.4 °C)-100.1 °F (37.8 °C)] 100.1 °F (37.8 °C)  Pulse:  [] 104  Resp:  [13-42] 33  SpO2:  [100 %] 100 %  BP: ()/(42-72) 105/64     Weight: 78 kg (171 lb 15.3 oz)  Body mass index is 23.98 kg/m².    Physical Exam  Vitals reviewed.   Constitutional:       Appearance: He is ill-appearing.   HENT:      Head: Normocephalic.   Cardiovascular:      Rate and Rhythm: Regular rhythm. Tachycardia present.      Pulses: Normal pulses.      Heart sounds: Normal heart sounds.   Pulmonary:      Comments: Intubated  Skin:     Findings: Erythema present.      Comments: Wound, see wound assessment and pictures   Neurological:      Mental Status: He is alert. Mental status is at baseline.      Motor: Weakness present.            Altered Skin Integrity 01/20/22 1000 Right anterior Foot Purple or maroon localized area of discolored intact skin or non-intact skin or a blood-filled blister. (Active)   01/20/22 1000    Altered Skin Integrity Present on Admission: suspected hospital acquired   Side: Right   Orientation: anterior   Location: Foot   Wound Number:    Is this injury device related?:    Primary Wound Type:    Description of Altered Skin Integrity: Purple or maroon localized area of discolored intact skin or non-intact skin or a blood-filled blister.   Removal Indication and Assessment:    Wound Outcome:    (Retired) Wound Length (cm):    (Retired) Wound Width (cm):    (Retired) Depth (cm):    Wound Description (Comments):    Removal Indications:    Wound Image    04/12/22 1020   Description of Altered Skin Integrity Full thickness tissue loss. Base is covered by slough and/or eschar in the wound bed 04/12/22 1020   Dressing Appearance Moist drainage 04/12/22 1020   Drainage Amount Scant 04/12/22 1020   Drainage Characteristics/Odor Serous;No odor 04/12/22 1020   Appearance Dressing in place, unable to visualize;Intact 04/14/22 0738   Tissue loss description Not applicable 04/12/22 1020   Periwound Area Moist;Macerated 04/12/22 1020   Wound Edges Defined;Open 04/12/22 1020   Wound Length (cm) 2 cm 04/12/22 1020   Wound Width (cm) 1.5 cm 04/12/22 1020   Wound Depth (cm) 0 cm 04/12/22 1020   Wound Volume (cm^3) 0 cm^3 04/12/22 1020   Wound Surface Area (cm^2) 3 cm^2 04/12/22 1020   Care Cleansed with:;Soap and water;Wound cleanser;Applied:;Removed:;Skin Barrier;Moisturizing agent 04/12/22 1020   Dressing Removed;Applied;Changed;Gauze, wet to moist;Gauze;Rolled gauze;Other (comment) 04/12/22 1020   Dressing Change Due 04/13/22 04/12/22 1020            Altered Skin Integrity 01/20/22 1000 Right lateral Foot Purple or maroon localized area of discolored intact skin or non-intact skin or a blood-filled blister. (Active)   01/20/22 1000   Altered Skin Integrity Present on Admission: suspected hospital acquired   Side: Right   Orientation: lateral   Location: Foot   Wound Number:    Is this injury device related?:    Primary  Wound Type:    Description of Altered Skin Integrity: Purple or maroon localized area of discolored intact skin or non-intact skin or a blood-filled blister.   Removal Indication and Assessment:    Wound Outcome:    (Retired) Wound Length (cm):    (Retired) Wound Width (cm):    (Retired) Depth (cm):    Wound Description (Comments):    Removal Indications:    Wound Image    04/12/22 1020   Description of Altered Skin Integrity Full thickness tissue loss. Base is covered by slough and/or eschar in the wound bed 04/12/22 1020   Dressing Appearance Moist drainage 04/12/22 1020   Drainage Amount Scant 04/12/22 1020   Drainage Characteristics/Odor Serous;Chung 04/12/22 1020   Appearance Dressing in place, unable to visualize 04/14/22 0738   Tissue loss description Not applicable 04/12/22 1020   Black (%), Wound Tissue Color 100 % 04/12/22 1020   Red (%), Wound Tissue Color 0 % 04/12/22 1020   Yellow (%), Wound Tissue Color 0 % 04/12/22 1020   Periwound Area Moist 04/12/22 1020   Wound Edges Defined;Open 04/12/22 1020   Wound Length (cm) 3 cm 04/12/22 1020   Wound Width (cm) 5 cm 04/12/22 1020   Wound Depth (cm) 0 cm 04/05/22 1130   Wound Volume (cm^3) 0 cm^3 04/05/22 1130   Wound Surface Area (cm^2) 15 cm^2 04/12/22 1020   Care Cleansed with:;Soap and water;Wound cleanser;Applied:;Removed:;Skin Barrier;Moisturizing agent 04/12/22 1020   Dressing Removed;Applied;Changed;Gauze, wet to moist;Gauze;Rolled gauze;Elastic bandage;Other (comment) 04/12/22 1020   Periwound Care Moisture barrier applied 04/12/22 1020   Dressing Change Due 04/13/22 04/12/22 1020            Altered Skin Integrity 02/08/22 1300 Left posterior Heel Other (comment) Full thickness tissue loss. Base is covered by slough and/or eschar in the wound bed (Active)   02/08/22 1300   Altered Skin Integrity Present on Admission: yes   Side: Left   Orientation: posterior   Location: Heel   Wound Number:    Is this injury device related?: No   Primary Wound Type:  Other   Description of Altered Skin Integrity: Full thickness tissue loss. Base is covered by slough and/or eschar in the wound bed   Removal Indication and Assessment:    Wound Outcome:    (Retired) Wound Length (cm):    (Retired) Wound Width (cm):    (Retired) Depth (cm):    Wound Description (Comments):    Removal Indications:    Wound Image      04/12/22 1020   Description of Altered Skin Integrity Full thickness tissue loss. Base is covered by slough and/or eschar in the wound bed 04/12/22 1020   Dressing Appearance Dry 04/12/22 1020   Drainage Amount None 04/12/22 1020   Drainage Characteristics/Odor Other (see comments) 04/12/22 1020   Appearance Dressing in place, unable to visualize 04/14/22 0738   Tissue loss description Not applicable 04/12/22 1020   Black (%), Wound Tissue Color 100 % 04/12/22 1020   Red (%), Wound Tissue Color 0 % 04/12/22 1020   Yellow (%), Wound Tissue Color 0 % 04/12/22 1020   Periwound Area Dry 04/12/22 1020   Wound Edges Defined 04/12/22 1020   Wound Length (cm) 5.5 cm 04/12/22 1020   Wound Width (cm) 6 cm 04/12/22 1020   Wound Depth (cm) 0 cm 04/12/22 1020   Wound Volume (cm^3) 0 cm^3 04/12/22 1020   Wound Surface Area (cm^2) 33 cm^2 04/12/22 1020   Care Cleansed with:;Soap and water;Wound cleanser;Applied:;Removed:;Skin Barrier;Moisturizing agent 04/12/22 1020   Dressing Removed;Applied;Changed;Gauze, wet to moist;Gauze;Rolled gauze;Other (comment) 04/12/22 1020   Periwound Care Moisture barrier applied 04/12/22 1020   Dressing Change Due 04/13/22 04/12/22 1020            Altered Skin Integrity 02/15/22 1300 Left lateral Leg Traumatic Partial thickness tissue loss. Shallow open ulcer with a red or pink wound bed, without slough. Intact or Open/Ruptured Serum-filled blister. (Active)   02/15/22 1300   Altered Skin Integrity Present on Admission: suspected hospital acquired   Side: Left   Orientation: lateral   Location: Leg   Wound Number:    Is this injury device related?: No    Primary Wound Type: Traumatic   Description of Altered Skin Integrity: Partial thickness tissue loss. Shallow open ulcer with a red or pink wound bed, without slough. Intact or Open/Ruptured Serum-filled blister.   Removal Indication and Assessment:    Wound Outcome:    (Retired) Wound Length (cm):    (Retired) Wound Width (cm):    (Retired) Depth (cm):    Wound Description (Comments):    Removal Indications:    Wound Image    04/12/22 1020   Description of Altered Skin Integrity Purple or maroon localized area of discolored intact skin or non-intact skin or a blood-filled blister. 03/28/22 1145   Dressing Appearance Dry;Intact;Open to air 04/12/22 1020   Drainage Amount None 04/12/22 1020   Appearance Dry 04/12/22 1020   Tissue loss description Not applicable 04/12/22 1020   Black (%), Wound Tissue Color 10 % 04/12/22 1020   Red (%), Wound Tissue Color 0 % 04/12/22 1020   Yellow (%), Wound Tissue Color 0 % 04/12/22 1020   Periwound Area Dry 04/12/22 1020   Wound Edges Defined 04/12/22 1020   Care Cleansed with:;Soap and water 04/12/22 1020   Dressing Removed;Changed 04/10/22 1600            Altered Skin Integrity 02/15/22 1300 Right anterior Foot Traumatic Partial thickness tissue loss. Shallow open ulcer with a red or pink wound bed, without slough. Intact or Open/Ruptured Serum-filled blister. (Active)   02/15/22 1300   Altered Skin Integrity Present on Admission: suspected hospital acquired   Side: Right   Orientation: anterior   Location: Foot   Wound Number:    Is this injury device related?: No   Primary Wound Type: Traumatic   Description of Altered Skin Integrity: Partial thickness tissue loss. Shallow open ulcer with a red or pink wound bed, without slough. Intact or Open/Ruptured Serum-filled blister.   Removal Indication and Assessment:    Wound Outcome:    (Retired) Wound Length (cm):    (Retired) Wound Width (cm):    (Retired) Depth (cm):    Wound Description (Comments):    Removal Indications:     Wound Image    04/12/22 1020   Description of Altered Skin Integrity Purple or maroon localized area of discolored intact skin or non-intact skin or a blood-filled blister. 03/15/22 1000   Dressing Appearance Open to air;Dry;Intact 04/12/22 1020   Drainage Amount None 04/12/22 1020   Drainage Characteristics/Odor Serosanguineous 02/26/22 1600   Appearance Dressing in place, unable to visualize 04/14/22 0738   Tissue loss description Not applicable 04/12/22 1020   Black (%), Wound Tissue Color 99 % 03/15/22 1000   Red (%), Wound Tissue Color 99 % 02/15/22 1300   Periwound Area Dry 04/12/22 1020   Wound Edges Defined 04/12/22 1020   Care Cleansed with:;Soap and water 04/12/22 1020   Dressing Gauze;Rolled gauze 04/11/22 1000   Dressing Change Due 02/28/22 02/24/22 1330            Altered Skin Integrity 02/28/22 1300 Left anterior Foot Traumatic Partial thickness tissue loss. Shallow open ulcer with a red or pink wound bed, without slough. Intact or Open/Ruptured Serum-filled blister. (Active)   02/28/22 1300   Altered Skin Integrity Present on Admission: suspected hospital acquired   Side: Left   Orientation: anterior   Location: Foot   Wound Number:    Is this injury device related?: No   Primary Wound Type: Traumatic   Description of Altered Skin Integrity: Partial thickness tissue loss. Shallow open ulcer with a red or pink wound bed, without slough. Intact or Open/Ruptured Serum-filled blister.   Removal Indication and Assessment:    Wound Outcome:    (Retired) Wound Length (cm):    (Retired) Wound Width (cm):    (Retired) Depth (cm):    Wound Description (Comments):    Removal Indications:    Wound Image    04/12/22 1020   Description of Altered Skin Integrity Full thickness tissue loss with exposed bone, tendon, or muscle. Often includes undermining and tunneling. May extend into muscle and/or supporting structures. 04/12/22 1020   Dressing Appearance Open to air;Dry;Intact 04/12/22 1020   Drainage Amount None  04/12/22 1020   Drainage Characteristics/Odor No odor 04/11/22 1000   Appearance Dressing in place, unable to visualize 04/14/22 0738   Tissue loss description Not applicable 04/12/22 1020   Black (%), Wound Tissue Color 100 % 04/12/22 1020   Red (%), Wound Tissue Color 0 % 04/12/22 1020   Yellow (%), Wound Tissue Color 0 % 04/12/22 1020   Periwound Area Dry 04/12/22 1020   Wound Edges Defined 04/12/22 1020   Care Cleansed with:;Soap and water 04/11/22 1000   Dressing Removed;Applied;Changed;Hydrocolloid 04/07/22 1130   Dressing Change Due 04/11/22 04/07/22 1130            Altered Skin Integrity 03/07/22 1300 Left anterior Greater trochanter Moisture associated dermatitis (Active)   03/07/22 1300   Altered Skin Integrity Present on Admission:    Side: Left   Orientation: anterior   Location: Greater trochanter   Wound Number:    Is this injury device related?: No   Primary Wound Type: Moisture ass   Description of Altered Skin Integrity:    Removal Indication and Assessment:    Wound Outcome:    (Retired) Wound Length (cm):    (Retired) Wound Width (cm):    (Retired) Depth (cm):    Wound Description (Comments):    Removal Indications:    Wound Image    03/17/22 1300   Dressing Appearance Open to air 04/12/22 0715   Drainage Amount None 04/12/22 0715   Appearance Intact 04/11/22 1000   Tissue loss description Not applicable 04/11/22 1000   Periwound Area Dry;Intact 04/11/22 1000   Wound Edges Defined 04/11/22 1000   Care Cleansed with:;Soap and water 04/11/22 1000            Altered Skin Integrity 03/07/22 1300 Right anterior Toe, fourth Ulceration Partial thickness tissue loss. Shallow open ulcer with a red or pink wound bed, without slough. Intact or Open/Ruptured Serum-filled blister. (Active)   03/07/22 1300   Altered Skin Integrity Present on Admission: yes   Side: Right   Orientation: anterior   Location: Toe, fourth   Wound Number:    Is this injury device related?: No   Primary Wound Type: Ulceration    Description of Altered Skin Integrity: Partial thickness tissue loss. Shallow open ulcer with a red or pink wound bed, without slough. Intact or Open/Ruptured Serum-filled blister.   Removal Indication and Assessment:    Wound Outcome:    (Retired) Wound Length (cm):    (Retired) Wound Width (cm):    (Retired) Depth (cm):    Wound Description (Comments):    Removal Indications:    Wound Image    04/05/22 1130   Description of Altered Skin Integrity Full thickness tissue loss. Base is covered by slough and/or eschar in the wound bed 04/05/22 1130   Dressing Appearance Dry;Intact 04/12/22 0715   Drainage Amount None 04/12/22 0715   Drainage Characteristics/Odor No odor 04/11/22 1000   Appearance Black;Dry 04/11/22 1000   Tissue loss description Not applicable 04/11/22 1000   Black (%), Wound Tissue Color 100 % 04/05/22 1130   Red (%), Wound Tissue Color 0 % 04/05/22 1130   Yellow (%), Wound Tissue Color 0 % 04/05/22 1130   Periwound Area Dry 04/11/22 1000   Wound Edges Defined 04/11/22 1000   Care Cleansed with:;Soap and water 04/11/22 1000   Dressing Removed;Applied;Changed;Hydrocolloid 04/07/22 1145   Dressing Change Due 04/11/22 04/07/22 1145            Altered Skin Integrity 03/15/22 1000 Right lateral Malleolus Ulceration Purple or maroon localized area of discolored intact skin or non-intact skin or a blood-filled blister. (Active)   03/15/22 1000   Altered Skin Integrity Present on Admission: suspected hospital acquired   Side: Right   Orientation: lateral   Location: Malleolus   Wound Number:    Is this injury device related?: Yes   Primary Wound Type: Ulceration   Description of Altered Skin Integrity: Purple or maroon localized area of discolored intact skin or non-intact skin or a blood-filled blister.   Removal Indication and Assessment:    Wound Outcome:    (Retired) Wound Length (cm):    (Retired) Wound Width (cm):    (Retired) Depth (cm):    Wound Description (Comments):    Removal Indications:     Wound Image    04/12/22 1020   Description of Altered Skin Integrity Full thickness tissue loss. Base is covered by slough and/or eschar in the wound bed 04/12/22 1020   Dressing Appearance Moist drainage 04/12/22 1020   Drainage Amount Scant 04/12/22 1020   Drainage Characteristics/Odor Serous;Chung 04/12/22 1020   Appearance Purple;Tan;Eschar;Necrotic;Moist 04/12/22 1020   Tissue loss description Not applicable 04/12/22 1020   Black (%), Wound Tissue Color 100 % 04/12/22 1020   Red (%), Wound Tissue Color 0 % 04/12/22 1020   Yellow (%), Wound Tissue Color 0 % 04/12/22 1020   Periwound Area Dry 04/12/22 1020   Wound Edges Defined;Open 04/12/22 1020   Wound Length (cm) 3 cm 04/12/22 1020   Wound Width (cm) 3.5 cm 04/12/22 1020   Wound Depth (cm) 0 cm 04/12/22 1020   Wound Volume (cm^3) 0 cm^3 04/12/22 1020   Wound Surface Area (cm^2) 10.5 cm^2 04/12/22 1020   Care Cleansed with:;Soap and water;Wound cleanser;Applied:;Moisturizing agent;Removed:;Skin Barrier 04/12/22 1020   Dressing Removed;Applied;Changed;Gauze, wet to moist;Gauze;Rolled gauze;Other (comment) 04/12/22 1020   Periwound Care Moisture barrier applied;Skin barrier film applied 04/12/22 1020   Dressing Change Due 04/13/22 04/12/22 1020            Altered Skin Integrity 03/15/22 1000 Right lateral Leg Ulceration Purple or maroon localized area of discolored intact skin or non-intact skin or a blood-filled blister. (Active)   03/15/22 1000   Altered Skin Integrity Present on Admission: suspected hospital acquired   Side: Right   Orientation: lateral   Location: Leg   Wound Number:    Is this injury device related?: No   Primary Wound Type: Ulceration   Description of Altered Skin Integrity: Purple or maroon localized area of discolored intact skin or non-intact skin or a blood-filled blister.   Removal Indication and Assessment:    Wound Outcome:    (Retired) Wound Length (cm):    (Retired) Wound Width (cm):    (Retired) Depth (cm):    Wound Description  (Comments):    Removal Indications:    Wound Image    04/12/22 1020   Description of Altered Skin Integrity Full thickness tissue loss. Base is covered by slough and/or eschar in the wound bed 04/12/22 1020   Dressing Appearance Moist drainage 04/12/22 1020   Drainage Amount Scant 04/12/22 1020   Drainage Characteristics/Odor Serous;Other (see comments) 04/12/22 1020   Appearance Purple;Eschar;Necrotic;Blistered;Moist 04/12/22 1020   Tissue loss description Not applicable 04/12/22 1020   Black (%), Wound Tissue Color 99 % 04/12/22 1020   Red (%), Wound Tissue Color 1 % 04/12/22 1020   Yellow (%), Wound Tissue Color 0 % 04/12/22 1020   Periwound Area Moist 04/12/22 1020   Wound Edges Defined;Open 04/12/22 1020   Wound Length (cm) 7.5 cm 04/12/22 1020   Wound Width (cm) 3.5 cm 04/12/22 1020   Wound Depth (cm) 0 cm 04/12/22 1020   Wound Volume (cm^3) 0 cm^3 04/12/22 1020   Wound Surface Area (cm^2) 26.25 cm^2 04/12/22 1020   Care Cleansed with:;Soap and water;Wound cleanser;Applied:;Removed:;Skin Barrier;Moisturizing agent 04/12/22 1020   Dressing Removed;Applied;Changed;Gauze, wet to moist;Gauze;Rolled gauze;Other (comment) 04/12/22 1020   Periwound Care Moisture barrier applied 04/12/22 1020   Dressing Change Due 04/13/22 04/12/22 1020            Altered Skin Integrity 03/22/22 1300 posterior Sacral spine Other (comment) Full thickness tissue loss. Base is covered by slough and/or eschar in the wound bed (Active)   03/22/22 1300   Altered Skin Integrity Present on Admission: yes   Side:    Orientation: posterior   Location: Sacral spine   Wound Number:    Is this injury device related?: No   Primary Wound Type: Other   Description of Altered Skin Integrity: Full thickness tissue loss. Base is covered by slough and/or eschar in the wound bed   Removal Indication and Assessment:    Wound Outcome:    (Retired) Wound Length (cm):    (Retired) Wound Width (cm):    (Retired) Depth (cm):    Wound Description (Comments):     Removal Indications:    Wound Image    04/11/22 1000   Description of Altered Skin Integrity Full thickness tissue loss. Base is covered by slough and/or eschar in the wound bed 04/12/22 0500   Dressing Appearance Dry;Intact 04/12/22 0715   Drainage Amount None 04/12/22 0715   Drainage Characteristics/Odor Malodorous;Serosanguineous 04/11/22 1000   Appearance Dressing in place, unable to visualize 04/12/22 2000   Tissue loss description Not applicable 04/11/22 1000   Black (%), Wound Tissue Color 0 % 04/11/22 1000   Red (%), Wound Tissue Color 20 % 04/11/22 1000   Yellow (%), Wound Tissue Color 80 % 04/11/22 1000   Periwound Area Moist;Macerated;Indurated;Edematous 04/11/22 1000   Wound Edges Defined;Open 04/11/22 1000   Wound Length (cm) 15 cm 04/11/22 1000   Wound Width (cm) 13 cm 04/11/22 1000   Wound Depth (cm) 3 cm 04/11/22 1000   Wound Volume (cm^3) 585 cm^3 04/11/22 1000   Wound Surface Area (cm^2) 195 cm^2 04/11/22 1000   Care Sterile normal saline 04/12/22 0500   Dressing Changed;Gauze, wet to dry 04/12/22 0500   Packing packed with;gauze, iodoform 04/12/22 0500   Dressing Change Due 04/12/22 04/11/22 1000            Altered Skin Integrity 04/05/22 1130 Right anterior Toe, second Traumatic (Active)   04/05/22 1130   Altered Skin Integrity Present on Admission:    Side: Right   Orientation: anterior   Location: Toe, second   Wound Number:    Is this injury device related?: No   Primary Wound Type: Traumatic   Description of Altered Skin Integrity:    Removal Indication and Assessment:    Wound Outcome:    (Retired) Wound Length (cm):    (Retired) Wound Width (cm):    (Retired) Depth (cm):    Wound Description (Comments):    Removal Indications:    Wound Image    04/12/22 1020   Dressing Appearance Dry 04/12/22 1020   Drainage Amount None 04/12/22 1020   Appearance Black 04/12/22 1020   Tissue loss description Not applicable 04/12/22 1020   Wound Edges Defined 04/12/22 1020   Care Cleansed with:;Soap and  water 04/12/22 1020   Dressing Hydrocolloid 04/11/22 1000            Altered Skin Integrity 04/07/22 1130 posterior Buttocks Incontinence associated dermatitis (Active)   04/07/22 1130   Altered Skin Integrity Present on Admission: suspected hospital acquired   Side:    Orientation: posterior   Location: Buttocks   Wound Number:    Is this injury device related?: No   Primary Wound Type: Incontinence   Description of Altered Skin Integrity:    Removal Indication and Assessment:    Wound Outcome:    (Retired) Wound Length (cm):    (Retired) Wound Width (cm):    (Retired) Depth (cm):    Wound Description (Comments):    Removal Indications:    Wound Image    04/11/22 1000   Dressing Appearance Intact;Dry 04/12/22 0715   Drainage Amount None 04/12/22 0715   Drainage Characteristics/Odor Serosanguineous;No odor 04/11/22 1000   Appearance Moist;Pink 04/11/22 1000   Tissue loss description Partial thickness 04/11/22 1000   Black (%), Wound Tissue Color 0 % 04/11/22 1000   Red (%), Wound Tissue Color 80 % 04/11/22 1000   Yellow (%), Wound Tissue Color 0 % 04/11/22 1000   Periwound Area Denuded;Moist 04/11/22 1000   Wound Edges Undefined 04/11/22 1000   Care Cleansed with:;Soap and water;Applied:;Moisturizing agent 04/11/22 1000   Dressing Changed 04/11/22 0425            Incision/Site 01/04/22 1444 Neck (Active)   01/04/22 1444   Present Prior to Hospital Arrival?:    Side:    Location: Neck   Orientation:    Incision Type:    Closure Method:    Additional Comments:    Removal Indication and Assessment:    Wound Outcome:    Removal Indications:    Incision WDL WDL 03/1955   Dressing Appearance Open to air 03/1955   Drainage Amount None 03/1955            Incision/Site 02/21/22 1318 Right Leg (Active)   02/21/22 1318   Present Prior to Hospital Arrival?:    Side: Right   Location: Leg   Orientation:    Incision Type:    Closure Method:    Additional Comments:    Removal Indication and Assessment:    Wound  Outcome:    Removal Indications:    Wound Image    04/05/22 1130   Incision WDL WDL 04/12/22 0715   Dressing Appearance Open to air 04/12/22 0715   Drainage Amount None 04/12/22 0715   Drainage Characteristics/Odor Serosanguineous;No odor 03/22/22 1300   Appearance Pink;Dry 04/11/22 1000   Black (%), Wound Tissue Color 0 % 04/05/22 1130   Red (%), Wound Tissue Color 100 % 04/05/22 1130   Yellow (%), Wound Tissue Color 0 % 04/05/22 1130   Periwound Area Dry 04/11/22 1000   Wound Edges Defined 04/11/22 1000   Care Soap and water;Cleansed with: 04/12/22 0715   Dressing Gauze;Other (comment) 03/08/22 1300   Dressing Change Due 02/28/22 02/24/22 1540            Incision/Site 03/09/22 1444 Abdomen (Active)   03/09/22 1444   Present Prior to Hospital Arrival?:    Side:    Location: Abdomen   Orientation:    Incision Type:    Closure Method:    Additional Comments:    Removal Indication and Assessment:    Wound Outcome:    Removal Indications:    Wound Image    04/07/22 1415   Incision WDL WDL 04/12/22 0715   Dressing Appearance Open to air 04/12/22 0715   Drainage Amount None 04/12/22 0715   Drainage Characteristics/Odor Serosanguineous;No odor 03/22/22 1300   Appearance Dry;Intact 04/11/22 1000   Black (%), Wound Tissue Color 0 % 04/11/22 1000   Red (%), Wound Tissue Color 100 % 04/11/22 1000   Yellow (%), Wound Tissue Color 0 % 04/11/22 1000   Periwound Area Dry 04/11/22 1000   Wound Edges Defined 04/11/22 1000   Care Cleansed with:;Soap and water 04/12/22 0715   Dressing Removed;Applied;Changed;Non-adherent;Other (comment) 03/22/22 1300            Incision/Site 03/25/22 1008 Buttocks (Active)   03/25/22 1008   Present Prior to Hospital Arrival?:    Side:    Location: Buttocks   Orientation:    Incision Type:    Closure Method:    Additional Comments:    Removal Indication and Assessment:    Wound Outcome:    Removal Indications:    Incision WDL ex 04/13/22 0730   Dressing Appearance Dry;Intact 04/14/22 0738   Drainage  Amount None 04/13/22 0730   Appearance Dressing in place, unable to visualize 04/14/22 0738   Care Cleansed with:;Antimicrobial agent;Wound cleanser 04/10/22 1600   Dressing Changed;Gauze, wet to dry 04/10/22 1600   Packing packed with;strip gauze 04/10/22 1600   Dressing Change Due 04/11/22 04/10/22 1600            Incision/Site 04/13/22 1052 Sacral Spine (Active)   04/13/22 1052   Present Prior to Hospital Arrival?:    Side:    Location: Sacral Spine   Orientation:    Incision Type:    Closure Method:    Additional Comments:    Removal Indication and Assessment:    Wound Outcome:    Removal Indications:    Dressing Appearance Dry;Clean;Intact 04/14/22 0738   Appearance Dressing in place, unable to visualize 04/14/22 0738       [REMOVED]      Negative Pressure Wound Therapy  12/14/21 midline;lower (Removed)   12/14/21    Side:    Orientation: midline;lower   Location: Lower quadrant   Additional Comments:    Removal Indication and Assessment: removed by previous caregiver   Location:    SDO Location:    Removed 02/16/22 0800   NPWT Type Vacuum Therapy 02/15/22 0600   Therapy Setting NPWT Vacuum off 02/15/22 1300   Pressure Setting NPWT 125 mmHg 02/15/22 0600   Therapy Interventions NPWT Seal intact 02/15/22 0600   Sponges Inserted NPWT Black 02/15/22 0600   Sponges Removed NPWT Black;White;1 02/10/22 1300   General Output (mL) 300 02/10/22 0930       [REMOVED]      Negative Pressure Wound Therapy  02/21/22 Right lower (Removed)   02/21/22    Side: Right   Orientation: lower   Location: Lower quadrant   Additional Comments:    Removal Indication and Assessment: No Longer Indicated   Location:    SDO Location:    Removed 02/22/22    NPWT Type Vacuum Therapy 02/22/22 0400   Therapy Setting NPWT Continuous therapy 02/22/22 0400   Pressure Setting NPWT other (see comments) 02/22/22 0400   Therapy Interventions NPWT Seal intact 02/22/22 0400   General Output (mL) 0 02/21/22 2340       [REMOVED]      Altered Skin  Integrity Left lateral Thigh Purple or maroon localized area of discolored intact skin or non-intact skin or a blood-filled blister. (Removed)       Altered Skin Integrity Present on Admission:    Side: Left   Orientation: lateral   Location: Thigh   Wound Number:    Is this injury device related?:    Primary Wound Type:    Description of Altered Skin Integrity: Purple or maroon localized area of discolored intact skin or non-intact skin or a blood-filled blister.   Removal Indication and Assessment:    Wound Outcome: Healed   (Retired) Wound Length (cm):    (Retired) Wound Width (cm):    (Retired) Depth (cm):    Wound Description (Comments):    Removal Indications:    Removed 03/15/22 1000   Wound Image    03/15/22 1000   Description of Altered Skin Integrity Purple or maroon localized area of discolored intact skin or non-intact skin or a blood-filled blister. 02/13/22 0730   Dressing Appearance Open to air;Dry;Intact 03/15/22 1000   Drainage Amount None 03/15/22 1000   Appearance Dry 03/15/22 1000   Care Cleansed with:;Soap and water 12/28/21 1521       [REMOVED]      Altered Skin Integrity 01/27/22 1045 Right dorsal Hand Other (comment) Full thickness tissue loss. Base is covered by slough and/or eschar in the wound bed (Removed)   01/27/22 1045   Altered Skin Integrity Present on Admission: suspected hospital acquired   Side: Right   Orientation: dorsal   Location: Hand   Wound Number:    Is this injury device related?: No   Primary Wound Type: Other   Description of Altered Skin Integrity: Full thickness tissue loss. Base is covered by slough and/or eschar in the wound bed   Removal Indication and Assessment:    Wound Outcome:    (Retired) Wound Length (cm):    (Retired) Wound Width (cm):    (Retired) Depth (cm):    Wound Description (Comments):    Removal Indications:    Removed 04/05/22 1130   Wound Image    04/05/22 1130   Description of Altered Skin Integrity Partial thickness tissue loss. Shallow open  ulcer with a red or pink wound bed, without slough. Intact or Open/Ruptured Serum-filled blister. 02/28/22 1300   Dressing Appearance Open to air;Dry;Intact 03/21/22 1300   Drainage Amount None 03/21/22 1300   Drainage Characteristics/Odor Serosanguineous;No odor 03/01/22 1330   Appearance Pink;Dry 04/05/22 1130   Tissue loss description Not applicable 04/05/22 1130   Black (%), Wound Tissue Color 99 % 02/07/22 1300   Red (%), Wound Tissue Color 95 % 02/24/22 1330   Yellow (%), Wound Tissue Color 5 % 02/24/22 1330   Periwound Area Dry;Intact 03/21/22 1300   Wound Edges Defined 03/21/22 1300   Wound Length (cm) 3.5 cm 02/07/22 1300   Wound Width (cm) 2 cm 02/07/22 1300   Wound Depth (cm) 0 cm 02/07/22 1300   Wound Volume (cm^3) 0 cm^3 02/07/22 1300   Wound Surface Area (cm^2) 7 cm^2 02/07/22 1300   Care Cleansed with:;Soap and water 03/21/22 1300   Dressing Applied;Other (comment) 03/02/22 1300   Dressing Change Due 03/02/22 03/01/22 1330       [REMOVED]      Altered Skin Integrity 02/08/22 1300 Right lateral Malleolus Traumatic Partial thickness tissue loss. Shallow open ulcer with a red or pink wound bed, without slough. Intact or Open/Ruptured Serum-filled blister. (Removed)   02/08/22 1300   Altered Skin Integrity Present on Admission: suspected hospital acquired   Side: Right   Orientation: lateral   Location: Malleolus   Wound Number:    Is this injury device related?: No   Primary Wound Type: Traumatic   Description of Altered Skin Integrity: Partial thickness tissue loss. Shallow open ulcer with a red or pink wound bed, without slough. Intact or Open/Ruptured Serum-filled blister.   Removal Indication and Assessment: site change   Wound Outcome:    (Retired) Wound Length (cm):    (Retired) Wound Width (cm):    (Retired) Depth (cm):    Wound Description (Comments):    Removal Indications:    Removed 03/15/22 1000   Wound Image    03/07/22 1300   Description of Altered Skin Integrity Partial thickness tissue  loss. Shallow open ulcer with a red or pink wound bed, without slough. Intact or Open/Ruptured Serum-filled blister. 03/07/22 1300   Dressing Appearance Dry 03/14/22 0945   Drainage Amount None 03/12/22 1621   Drainage Characteristics/Odor Brown;No odor 03/09/22 1250   Appearance Dressing in place, unable to visualize;Intact 03/13/22 2000   Tissue loss description Not applicable 03/09/22 1250   Periwound Area Dry 03/14/22 0945   Wound Edges Defined 03/14/22 0945   Care Cleansed with:;Soap and water 03/16/22 1100   Dressing Applied;Silver 03/16/22 1100   Dressing Change Due 03/11/22 03/10/22 1100       [REMOVED]      Altered Skin Integrity 02/08/22 1300 Right posterior Achilles Traumatic Purple or maroon localized area of discolored intact skin or non-intact skin or a blood-filled blister. (Removed)   02/08/22 1300   Altered Skin Integrity Present on Admission: suspected hospital acquired   Side: Right   Orientation: posterior   Location: Achilles   Wound Number:    Is this injury device related?: No   Primary Wound Type: Traumatic   Description of Altered Skin Integrity: Purple or maroon localized area of discolored intact skin or non-intact skin or a blood-filled blister.   Removal Indication and Assessment:    Wound Outcome: Healed   (Retired) Wound Length (cm):    (Retired) Wound Width (cm):    (Retired) Depth (cm):    Wound Description (Comments):    Removal Indications:    Removed 03/15/22 1000   Wound Image    03/15/22 1000   Description of Altered Skin Integrity Purple or maroon localized area of discolored intact skin or non-intact skin or a blood-filled blister. 03/07/22 1300   Dressing Appearance Dry;Open to air;Intact 03/15/22 1000   Drainage Amount None 03/15/22 1000   Appearance Dressing in place, unable to visualize;Intact 03/13/22 2000   Tissue loss description Not applicable 03/15/22 1000   Wound Edges Defined 03/15/22 1000   Care Cleansed with:;Soap and water 03/15/22 1000   Dressing Changed  02/21/22 2340       [REMOVED]      Wound 12/27/21 1100 Traumatic Left anterior Knee (Removed)   12/27/21 1100    Pre-existing: Yes   Primary Wound Type: Traumatic   Side: Left   Orientation: anterior   Location: Knee   Wound Number:    Ankle-Brachial Index:    Pulses:    Removal Indication and Assessment:    Wound Outcome: Healed   (Retired) Wound Type:    (Retired) Wound Length (cm):    (Retired) Wound Width (cm):    (Retired) Depth (cm):    Wound Description (Comments):    Removal Indications:    Removed 02/28/22 1300   Wound Image    02/24/22 1330   Wound WDL WDL 02/28/22 1300   Dressing Appearance Open to air;Dry;Intact 02/28/22 1300   Drainage Amount None 02/28/22 1300   Appearance Intact 02/28/22 1300   Care Cleansed with:;Soap and water 02/07/22 1300       [REMOVED]      Incision/Site 12/13/21 2317 Perineum (Removed)   12/13/21 2317   Present Prior to Hospital Arrival?:    Side:    Location: Perineum   Orientation:    Incision Type:    Closure Method: Other (see comments)   Additional Comments:    Removal Indication and Assessment: site change   Wound Outcome:    Removal Indications:    Removed 03/22/22 1300   Wound Image    03/15/22 1000   Incision WDL WDL 03/21/22 1300   Dressing Appearance Moist drainage 03/21/22 1300   Drainage Amount Small 03/21/22 1300   Drainage Characteristics/Odor Serosanguineous;No odor 03/21/22 1300   Appearance Dressing in place, unable to visualize;Intact 03/23/22 0000   Black (%), Wound Tissue Color 0 % 03/15/22 1000   Red (%), Wound Tissue Color 99 % 03/15/22 1000   Yellow (%), Wound Tissue Color 0 % 03/15/22 1000   Periwound Area Moist 03/21/22 1300   Wound Edges Defined;Open 03/21/22 1300   Wound Length (cm) 7.5 cm 03/15/22 1000   Wound Width (cm) 11 cm 03/15/22 1000   Wound Depth (cm) 2 cm 03/15/22 1000   Wound Volume (cm^3) 165 cm^3 03/15/22 1000   Wound Surface Area (cm^2) 82.5 cm^2 03/15/22 1000   Care Cleansed with:;Soap and water;Wound cleanser;Applied:;Removed:;Skin  Barrier;Moisturizing agent 03/21/22 1300   Dressing Removed;Applied;Changed;Gauze, wet to moist;Gauze;Other (comment) 03/21/22 1300   Packing packing removed 03/20/22 1550   Packing Inserted  1 01/23/22 1800   Packing Removed 1 02/05/22 0730   Periwound Care Moisture barrier applied;Skin barrier film applied 03/21/22 1300   Dressing Change Due 03/22/22 03/21/22 1300       [REMOVED]      Incision/Site 12/27/21 1100 Right Buttocks (Removed)   12/27/21 1100   Present Prior to Hospital Arrival?:    Side: Right   Location: Buttocks   Orientation:    Incision Type:    Closure Method:    Additional Comments:    Removal Indication and Assessment:    Wound Outcome:    Removal Indications:    Removed 12/27/21 1100   Incision WDL WDL 12/26/21 0730   Dressing Appearance Dry;Intact;Dried drainage 12/26/21 0730   Drainage Amount Scant 12/26/21 0730   Drainage Characteristics/Odor Brown 12/26/21 0730   Appearance Dressing in place, unable to visualize 12/27/21 2000   Care Cleansed with:;Antimicrobial agent;Soap and water;Applied:;Other (see comments) 12/26/21 0730   Dressing Removed;Changed;Gauze, wet to dry 12/26/21 0730   Dressing Change Due 12/27/21 12/26/21 1530       [REMOVED]      Incision/Site 12/14/21 1238 Abdomen (Removed)   12/14/21 1238   Present Prior to Hospital Arrival?:    Side:    Location: Abdomen   Orientation:    Incision Type:    Closure Method:    Additional Comments:    Removal Indication and Assessment: site change   Wound Outcome:    Removal Indications:    Removed 02/07/22 1330   Wound Image    12/27/21 1100   Incision WDL WDL 01/27/22 1045   Dressing Appearance Dry;Intact 01/22/22 0010   Drainage Amount Moderate 01/18/22 0730   Drainage Characteristics/Odor Brown 01/09/22 0715   Appearance Chung;Moist 12/29/21 1100   Periwound Area Moist 12/29/21 1100   Wound Edges Defined;Open 12/29/21 1100   Care Cleansed with:;Sterile normal saline;Applied:;Removed:;Skin Barrier 12/28/21 1330   Dressing  Removed;Changed;Gauze, wet to dry 01/01/22 0550   Packing packing removed;packed with;gauze, iodoform 01/01/22 0550   Dressing Change Due 01/02/22 01/01/22 0550       [REMOVED]      Incision/Site 01/01/22 0936 Abdomen (Removed)   01/01/22 0936   Present Prior to Hospital Arrival?:    Side:    Location: Abdomen   Orientation:    Incision Type:    Closure Method:    Additional Comments:    Removal Indication and Assessment: site change   Wound Outcome:    Removal Indications:    Removed 02/07/22 1330   Incision WDL ex 01/23/22 0730   Dressing Appearance Intact;Clean 01/23/22 2010   Drainage Amount Moderate 01/23/22 0730   Drainage Characteristics/Odor Sanguineous 01/23/22 0730   Appearance Dressing in place, unable to visualize 01/23/22 0730   Dressing Foam 01/19/22 0730       [REMOVED]      Incision/Site 01/01/22 0956 Right Groin (Removed)   01/01/22 0956   Present Prior to Hospital Arrival?:    Side: Right   Location: Groin   Orientation:    Incision Type:    Closure Method:    Additional Comments:    Removal Indication and Assessment: site change   Wound Outcome:    Removal Indications:    Removed 02/07/22 1300   Incision WDL WDL 01/22/22 0745   Dressing Appearance Open to air 01/19/22 0730   Drainage Amount None 01/19/22 0730   Care Cleansed with:;Soap and water 01/19/22 0730   Dressing Applied 01/14/22 0800       [REMOVED]      Incision/Site 01/04/22 1444 Abdomen (Removed)   01/04/22 1444   Present Prior to Hospital Arrival?:    Side:    Location: Abdomen   Orientation:    Incision Type:    Closure Method:    Additional Comments:    Removal Indication and Assessment: site change   Wound Outcome:    Removal Indications:    Removed 02/07/22 1300   Incision WDL ex 01/19/22 0730   Dressing Appearance Dry;Intact 01/21/22 0730   Drainage Amount Moderate 01/21/22 0730   Drainage Characteristics/Odor Brown;Serosanguineous 01/21/22 0730   Appearance Intact 01/19/22 0730   Dressing Foam 01/19/22 0730       [REMOVED]       Incision/Site 01/04/22 1444 Buttocks (Removed)   01/04/22 1444   Present Prior to Hospital Arrival?:    Side:    Location: Buttocks   Orientation:    Incision Type:    Closure Method:    Additional Comments:    Removal Indication and Assessment: site change   Wound Outcome:    Removal Indications:    Removed 03/22/22 1300   Wound Image    03/15/22 1000   Incision WDL WDL 03/21/22 1300   Dressing Appearance Moist drainage 03/21/22 1300   Drainage Amount Small 03/21/22 1300   Drainage Characteristics/Odor Serosanguineous;Other (see comments) 03/21/22 1300   Appearance Dressing in place, unable to visualize;Intact 03/23/22 0000   Black (%), Wound Tissue Color 80 % 03/15/22 1000   Red (%), Wound Tissue Color 20 % 03/15/22 1000   Yellow (%), Wound Tissue Color 0 % 03/15/22 1000   Periwound Area Moist 03/21/22 1300   Wound Edges Defined;Open 03/21/22 1300   Wound Length (cm) 10 cm 03/15/22 1000   Wound Width (cm) 6 cm 03/15/22 1000   Wound Depth (cm) 0 cm 03/15/22 1000   Wound Volume (cm^3) 0 cm^3 03/15/22 1000   Wound Surface Area (cm^2) 60 cm^2 03/15/22 1000   Care Cleansed with:;Soap and water;Wound cleanser;Applied:;Removed:;Skin Barrier;Moisturizing agent 03/21/22 1300   Dressing Removed;Applied;Changed;Gauze, wet to moist;Gauze;Other (comment) 03/21/22 1300   Packing packing removed 03/13/22 1300   Periwound Care Moisture barrier applied;Skin barrier film applied 03/21/22 1300   Dressing Change Due 03/22/22 03/21/22 1300       [REMOVED]      Incision/Site 01/07/22 1106 Abdomen (Removed)   01/07/22 1106   Present Prior to Hospital Arrival?:    Side:    Location: Abdomen   Orientation:    Incision Type:    Closure Method:    Additional Comments:    Removal Indication and Assessment: site change   Wound Outcome:    Removal Indications:    Removed 02/07/22 1300   Dressing Appearance Dry;Intact 02/05/22 0730   Drainage Amount None 02/05/22 0730   Drainage Characteristics/Odor Brown;Sanguineous 01/19/22 0730   Dressing  Other (comment) 01/10/22 0720       [REMOVED]      Incision/Site 01/10/22 1518 Abdomen (Removed)   01/10/22 1518   Present Prior to Hospital Arrival?:    Side:    Location: Abdomen   Orientation:    Incision Type:    Closure Method:    Additional Comments:    Removal Indication and Assessment:    Wound Outcome: Healed   Removal Indications:    Removed 03/15/22 1000   Wound Image     03/15/22 1000   Incision WDL WDL 03/15/22 1000   Dressing Appearance Moist drainage 03/15/22 1000   Drainage Amount Scant 03/15/22 1000   Drainage Characteristics/Odor Serosanguineous;No odor 03/15/22 1000   Appearance Pink;Hypergranulation;Moist;Dry 03/15/22 1000   Red (%), Wound Tissue Color 30 % 03/15/22 1000   Periwound Area Dry;Moist 03/15/22 1000   Wound Edges Defined 03/15/22 1000   Care Cleansed with:;Soap and water;Applied:;Removed:;Skin Barrier;Moisturizing agent 03/15/22 1000   Dressing Removed;Applied;Changed;Gauze;Non-adherent;Other (comment) 03/15/22 1000   Dressing Change Due 03/18/22 03/15/22 1000       [REMOVED]      Incision/Site 01/12/22 1001 Abdomen (Removed)   01/12/22 1001   Present Prior to Hospital Arrival?:    Side:    Location: Abdomen   Orientation:    Incision Type:    Closure Method:    Additional Comments:    Removal Indication and Assessment: site change   Wound Outcome:    Removal Indications:    Removed 02/07/22 1300   Dressing Appearance Intact;Dry;Clean 02/09/22 0730   Drainage Amount None 02/09/22 0730   Care Cleansed with:;Soap and water 01/14/22 1000   Dressing Applied;Other (comment) 01/14/22 1000       [REMOVED]      Incision/Site 02/21/22 1318 Abdomen (Removed)   02/21/22 1318   Present Prior to Hospital Arrival?:    Side:    Location: Abdomen   Orientation:    Incision Type:    Closure Method:    Additional Comments:    Removal Indication and Assessment: site change   Wound Outcome:    Removal Indications:    Removed 02/23/22 1330   Dressing Appearance Dry;Intact 02/23/22 0000   Appearance  Dressing in place, unable to visualize 02/23/22 0000       Assessment and Plan      Wound Assessment:  1. Sacral wound  2.  Lower extremity wounds    Wound Care Plan:   1. Clean with vashe.   2. Apply silvadene to all wounds  3. Cover and secure dry dressing.   4. Change daily and PRN for soilage.   5. Turn every 2 hours  6. Low air loss mattress  7. TAP system  8. Keep pressure off wounds        Signature:  HERSON Viveros  Wound Care    Date of encounter: 04/14/2022

## 2022-04-14 NOTE — SUBJECTIVE & OBJECTIVE
Interval History: No acute events overnight. The patient is currently resting comfortably. He is currently afebrile and vital signs are stable.    Objective:     Vital Signs (Most Recent):  Temp: 98.2 °F (36.8 °C) (04/14/22 0800)  Pulse: 100 (04/14/22 0800)  Resp: (!) 25 (04/14/22 0800)  BP: 116/61 (04/14/22 0800)  SpO2: 100 % (04/14/22 0800)   Vital Signs (24h Range):  Temp:  [97.4 °F (36.3 °C)-99.7 °F (37.6 °C)] 98.2 °F (36.8 °C)  Pulse:  [] 100  Resp:  [12-42] 25  SpO2:  [100 %] 100 %  BP: ()/(42-74) 116/61     Weight: 78 kg (171 lb 15.3 oz)  Body mass index is 23.98 kg/m².      Intake/Output Summary (Last 24 hours) at 4/14/2022 0846  Last data filed at 4/14/2022 0600  Gross per 24 hour   Intake 1885 ml   Output 1982 ml   Net -97 ml         Physical Exam  Vitals reviewed.   Constitutional:       General: He is not in acute distress.     Appearance: He is ill-appearing.      Comments: Chronically ill appearing   HENT:      Right Ear: External ear normal.      Left Ear: External ear normal.      Mouth/Throat:      Mouth: Mucous membranes are moist.   Eyes:      Pupils: Pupils are equal, round, and reactive to light.   Cardiovascular:      Rate and Rhythm: Normal rate and regular rhythm.   Pulmonary:      Breath sounds: Normal breath sounds. No wheezing, rhonchi or rales.   Abdominal:      Palpations: Abdomen is soft.   Musculoskeletal:         General: Normal range of motion.      Cervical back: Normal range of motion and neck supple.   Skin:     General: Skin is warm and dry.      Capillary Refill: Capillary refill takes less than 2 seconds.   Neurological:      Mental Status: He is alert. Mental status is at baseline.       Vents:  Vent Mode: A/C (04/14/22 0816)  Ventilator Initiated: Yes (04/10/22 0343)  Set Rate: 14 BPM (04/14/22 0515)  Vt Set: 480 mL (04/14/22 0515)  Pressure Support: 10 cmH20 (04/14/22 0816)  PEEP/CPAP: 5 cmH20 (04/14/22 0816)  Oxygen Concentration (%): 35 (04/14/22 0738)  Peak  Airway Pressure: 16 cmH2O (04/14/22 0816)  Plateau Pressure: 20 cmH20 (03/07/22 0500)  Total Ve: 5.7 mL (04/14/22 0816)  F/VT Ratio<105 (RSBI): (!) 58.97 (04/14/22 0154)    Lines/Drains/Airways       Central Venous Catheter Line  Duration                  Hemodialysis Catheter 12/30/21 0900 right internal jugular 104 days              Drain  Duration                  Colostomy 12/18/21 1030 Descending/sigmoid  days         Gastrostomy/Enterostomy 03/09/22 1436 Percutaneous endoscopic gastrostomy (PEG) midline feeding 35 days              Airway  Duration                  Surgical Airway 01/04/22 Shiley 100 days              Peripheral Intravenous Line  Duration                  Peripheral IV - Single Lumen 04/04/22 1430 22 G Posterior;Right Hand 9 days         Peripheral IV - Single Lumen 04/11/22 1623 18 G Anterior;Proximal;Right Forearm 2 days                    Significant Labs:    CBC/Anemia Profile:  Recent Labs   Lab 04/13/22  0252   WBC 16.10*   HGB 7.5*   HCT 22.7*   *   MCV 89.0   RDW 15.5*          Chemistries:  No results for input(s): NA, K, CL, CO2, BUN, CREATININE, CALCIUM, ALBUMIN, PROT, BILITOT, ALKPHOS, ALT, AST, GLUCOSE, MG, PHOS in the last 48 hours.    All pertinent labs within the past 24 hours have been reviewed.    Significant Imaging:  I have reviewed all pertinent imaging results/findings within the past 24 hours.

## 2022-04-14 NOTE — PLAN OF CARE
Problem: Communication Impairment (Mechanical Ventilation, Invasive)  Goal: Effective Communication  Outcome: Ongoing, Progressing     Problem: Device-Related Complication Risk (Mechanical Ventilation, Invasive)  Goal: Optimal Device Function  Outcome: Ongoing, Progressing     Problem: Inability to Wean (Mechanical Ventilation, Invasive)  Goal: Mechanical Ventilation Liberation  Outcome: Ongoing, Progressing     Problem: Nutrition Impairment (Mechanical Ventilation, Invasive)  Goal: Optimal Nutrition Delivery  Outcome: Ongoing, Progressing     Problem: Skin and Tissue Injury (Mechanical Ventilation, Invasive)  Goal: Absence of Device-Related Skin and Tissue Injury  Outcome: Ongoing, Progressing     Problem: Ventilator-Induced Lung Injury (Mechanical Ventilation, Invasive)  Goal: Absence of Ventilator-Induced Lung Injury  Outcome: Ongoing, Progressing     Problem: Communication Impairment (Artificial Airway)  Goal: Effective Communication  Outcome: Ongoing, Progressing     Problem: Device-Related Complication Risk (Artificial Airway)  Goal: Optimal Device Function  Outcome: Ongoing, Progressing     Problem: Skin and Tissue Injury (Artificial Airway)  Goal: Absence of Device-Related Skin or Tissue Injury  Outcome: Ongoing, Progressing     Problem: Gas Exchange Impaired  Goal: Optimal Gas Exchange  Outcome: Ongoing, Progressing

## 2022-04-14 NOTE — PROGRESS NOTES
[facility]  Pulmonology  Progress Note    Patient Name: Og Garcia  MRN: [id number]  Admission Date: 12/23/2021  Hospital Length of Stay: 112 days  Code Status: Prior  Attending Provider: Cecil Abernathy DO  Primary Care Provider: Hector Arndt DNP, FNP-C   Principal Problem: Denice gangrene    Subjective:     Interval History: No acute events overnight. The patient is currently resting comfortably. He is currently afebrile and vital signs are stable.    Objective:     Vital Signs (Most Recent):  Temp: 98.2 °F (36.8 °C) (04/14/22 0800)  Pulse: 100 (04/14/22 0800)  Resp: (!) 25 (04/14/22 0800)  BP: 116/61 (04/14/22 0800)  SpO2: 100 % (04/14/22 0800)   Vital Signs (24h Range):  Temp:  [97.4 °F (36.3 °C)-99.7 °F (37.6 °C)] 98.2 °F (36.8 °C)  Pulse:  [] 100  Resp:  [12-42] 25  SpO2:  [100 %] 100 %  BP: ()/(42-74) 116/61     Weight: 78 kg (171 lb 15.3 oz)  Body mass index is 23.98 kg/m².      Intake/Output Summary (Last 24 hours) at 4/14/2022 0846  Last data filed at 4/14/2022 0600  Gross per 24 hour   Intake 1885 ml   Output 1982 ml   Net -97 ml         Physical Exam  Vitals reviewed.   Constitutional:       General: He is not in acute distress.     Appearance: He is ill-appearing.      Comments: Chronically ill appearing   HENT:      Right Ear: External ear normal.      Left Ear: External ear normal.      Mouth/Throat:      Mouth: Mucous membranes are moist.   Eyes:      Pupils: Pupils are equal, round, and reactive to light.   Cardiovascular:      Rate and Rhythm: Normal rate and regular rhythm.   Pulmonary:      Breath sounds: Normal breath sounds. No wheezing, rhonchi or rales.   Abdominal:      Palpations: Abdomen is soft.   Musculoskeletal:         General: Normal range of motion.      Cervical back: Normal range of motion and neck supple.   Skin:     General: Skin is warm and dry.      Capillary Refill: Capillary refill takes less than 2 seconds.   Neurological:       Mental Status: He is alert. Mental status is at baseline.       Vents:  Vent Mode: A/C (04/14/22 0816)  Ventilator Initiated: Yes (04/10/22 0343)  Set Rate: 14 BPM (04/14/22 0515)  Vt Set: 480 mL (04/14/22 0515)  Pressure Support: 10 cmH20 (04/14/22 0816)  PEEP/CPAP: 5 cmH20 (04/14/22 0816)  Oxygen Concentration (%): 35 (04/14/22 0738)  Peak Airway Pressure: 16 cmH2O (04/14/22 0816)  Plateau Pressure: 20 cmH20 (03/07/22 0500)  Total Ve: 5.7 mL (04/14/22 0816)  F/VT Ratio<105 (RSBI): (!) 58.97 (04/14/22 0154)    Lines/Drains/Airways       Central Venous Catheter Line  Duration                  Hemodialysis Catheter 12/30/21 0900 right internal jugular 104 days              Drain  Duration                  Colostomy 12/18/21 1030 Descending/sigmoid  days         Gastrostomy/Enterostomy 03/09/22 1436 Percutaneous endoscopic gastrostomy (PEG) midline feeding 35 days              Airway  Duration                  Surgical Airway 01/04/22 Shiley 100 days              Peripheral Intravenous Line  Duration                  Peripheral IV - Single Lumen 04/04/22 1430 22 G Posterior;Right Hand 9 days         Peripheral IV - Single Lumen 04/11/22 1623 18 G Anterior;Proximal;Right Forearm 2 days                    Significant Labs:    CBC/Anemia Profile:  Recent Labs   Lab 04/13/22  0252   WBC 16.10*   HGB 7.5*   HCT 22.7*   *   MCV 89.0   RDW 15.5*          Chemistries:  No results for input(s): NA, K, CL, CO2, BUN, CREATININE, CALCIUM, ALBUMIN, PROT, BILITOT, ALKPHOS, ALT, AST, GLUCOSE, MG, PHOS in the last 48 hours.    All pertinent labs within the past 24 hours have been reviewed.    Significant Imaging:  I have reviewed all pertinent imaging results/findings within the past 24 hours.    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 Will start Clindamycin today -   4/8- clinically looks better- will resume PSV today as tolerated   4/9- increased respiratory rate and tachycardic on exam.  Patient had just been turned.  This is all likely secondary to pain.  Will treat his pain and then increase his CPAP to 4 hours t.i.d. as tolerated.  4/13- to OR today  Then  -continue PSV as tolerated   4/14- Continue to increase trials as tolerated    Tachycardia  Treated anemia   - concern this may be releated to pain and pain medication withdraw - his fentanyl patch was discontinued over the weekend which he has been on for weeks.  -plan - will start PO pain medication q8hrs then decrease to q12 hours after 7 days.   4/8 - improving   4/9-heart rate up on exam.  Secondary to pain.  Will treat p.r.n. pain medication.  4/14- had some SVT yesterday that improved with treating his pain and lopressor    Pressure ulcer of sacral region, unstageable  Culture and sensitivity reviewed -now on vancomycin and zosyn   - wound care NP to evaluate to see if he may need debridement   4/13- scheduled for debridement today     Fever  2/28 - resp culture with pseudomonas and klebsiella  treat with zosyn for 7 days   Blood culture from 2/25 also growing Pseudomonas. Sensitive to zosyn. Will need to continue zosyn for at least 10 days total  - low grade fever since 3/28, CXR- unchanged and repeat CBC- WBC 20.57  - removed malpositioned LUE PICC.  - blood cultures- NGTD after 72  Hours   -4/6-  Temp 101 at noon yesterday- wound cultures with heavy growth gram neg bacilli - will go ahead and add Rocephin and continue to follow cultures    4/7- febrile this morning -  added clinda given his aspiration event   4/8 - afebrile- would cultures reviewed - sensitive to Rocephin   4/9-  wound culture sensitivity has returned; escalating to Zosyn specially given his fever overnight.  4/10--- febrile - TMAx 103.1 - will add MRSA coverage - pan culture   4/11- Tmax 99.9 after adding MRSA coverage   4/12- no fever in last 24 hours  4/14- remains afebrile    ESRD (end stage renal disease)  Started on HD 12/30   Continue with HD per nephrology      Type 2 diabetes mellitus without complication, without long-term current use of insulin  Continue with current care  accuchecks are ok    Acute blood loss anemia  No active bleeding  Hgb 6.0 on 4/11  Patient transfused 1 unit with dialysis yesterday  4/13 -H/H 7.5/22.7    Necrotizing fasciitis  S/p multiple surgeries   Last debridement for wounds was 4/13    Hypertension  BP currently ok                   Cecil Abernathy,   Pulmonology  [facility]

## 2022-04-14 NOTE — ASSESSMENT & PLAN NOTE
Was doing trach collar over the weekend but had to be placed back on the vent earlier this week   - will treat his pain- hopefully this will improve his HR and then start back on weaning trails   4/7- aspirated yesterday - febrile this morning. Will start Clindamycin today -   4/8- clinically looks better- will resume PSV today as tolerated   4/9- increased respiratory rate and tachycardic on exam.  Patient had just been turned.  This is all likely secondary to pain.  Will treat his pain and then increase his CPAP to 4 hours t.i.d. as tolerated.  4/13- to OR today  Then  -continue PSV as tolerated   4/14- Continue to increase trials as tolerated

## 2022-04-14 NOTE — PLAN OF CARE
Problem: Fluid and Electrolyte Imbalance (Acute Kidney Injury/Impairment)  Goal: Fluid and Electrolyte Balance  Outcome: Ongoing, Progressing     Problem: Renal Function Impairment (Acute Kidney Injury/Impairment)  Goal: Effective Renal Function  Outcome: Ongoing, Progressing     Problem: Infection  Goal: Absence of Infection Signs and Symptoms  Outcome: Ongoing, Progressing     Problem: Communication Impairment (Mechanical Ventilation, Invasive)  Goal: Effective Communication  Outcome: Ongoing, Progressing     Problem: Inability to Wean (Mechanical Ventilation, Invasive)  Goal: Mechanical Ventilation Liberation  Outcome: Ongoing, Progressing     Problem: Skin and Tissue Injury (Artificial Airway)  Goal: Absence of Device-Related Skin or Tissue Injury  Outcome: Ongoing, Progressing     Problem: Hemodynamic Instability (Hemodialysis)  Goal: Effective Tissue Perfusion  Outcome: Ongoing, Progressing     Problem: Infection (Hemodialysis)  Goal: Absence of Infection Signs and Symptoms  Outcome: Ongoing, Progressing     Problem: Gas Exchange Impaired  Goal: Optimal Gas Exchange  Outcome: Ongoing, Progressing

## 2022-04-14 NOTE — ASSESSMENT & PLAN NOTE
2/28 - resp culture with pseudomonas and klebsiella  treat with zosyn for 7 days   Blood culture from 2/25 also growing Pseudomonas. Sensitive to zosyn. Will need to continue zosyn for at least 10 days total  - low grade fever since 3/28, CXR- unchanged and repeat CBC- WBC 20.57  - removed malpositioned LUE PICC.  - blood cultures- NGTD after 72  Hours   -4/6-  Temp 101 at noon yesterday- wound cultures with heavy growth gram neg bacilli - will go ahead and add Rocephin and continue to follow cultures    4/7- febrile this morning - added clinda given his aspiration event   4/8 - afebrile- would cultures reviewed - sensitive to Rocephin   4/9-  wound culture sensitivity has returned; escalating to Zosyn specially given his fever overnight.  4/10--- febrile - TMAx 103.1 - will add MRSA coverage - pan culture   4/11- Tmax 99.9 after adding MRSA coverage   4/12- no fever in last 24 hours  4/14- remains afebrile

## 2022-04-14 NOTE — ASSESSMENT & PLAN NOTE
Treated anemia   - concern this may be releated to pain and pain medication withdraw - his fentanyl patch was discontinued over the weekend which he has been on for weeks.  -plan - will start PO pain medication q8hrs then decrease to q12 hours after 7 days.   4/8 - improving   4/9-heart rate up on exam.  Secondary to pain.  Will treat p.r.n. pain medication.  4/14- had some SVT yesterday that improved with treating his pain and lopressor

## 2022-04-14 NOTE — NURSING
Pt resting  On ventilator .. will open eyes to verbal stimuli.. does not follow any commands ..  assessements done.. drsg to sacral and austyn jessica/heels intact .. will monitor for bleeding  Pt is s/p debridement surgery of those areas today .. total care safety measures ongoing

## 2022-04-15 NOTE — PROGRESS NOTES
[facility]  Pulmonology  Progress Note    Patient Name: Og Garcia  MRN: [id number]  Admission Date: 12/23/2021  Hospital Length of Stay: 113 days  Code Status: Prior  Attending Provider: Cecil Abernathy DO  Primary Care Provider: Hector Arndt DNP, FNP-C   Principal Problem: Denice gangrene    Subjective:     Interval History: No acute events overnight. The patient is currently resting comfortably. He is currently afebrile and vital signs are stable.    Objective:     Vital Signs (Most Recent):  Temp: 98.7 °F (37.1 °C) (04/15/22 0800)  Pulse: 92 (04/15/22 0800)  Resp: (!) 23 (04/15/22 0800)  BP: (!) 123/59 (04/15/22 0800)  SpO2: 100 % (04/15/22 0800)   Vital Signs (24h Range):  Temp:  [98.2 °F (36.8 °C)-100.1 °F (37.8 °C)] 98.7 °F (37.1 °C)  Pulse:  [] 92  Resp:  [16-39] 23  SpO2:  [100 %] 100 %  BP: ()/(49-64) 123/59     Weight: 79 kg (174 lb 2.6 oz)  Body mass index is 24.29 kg/m².      Intake/Output Summary (Last 24 hours) at 4/15/2022 0919  Last data filed at 4/15/2022 0600  Gross per 24 hour   Intake 1870 ml   Output 300 ml   Net 1570 ml         Physical Exam  Vitals reviewed.   Constitutional:       General: He is not in acute distress.     Appearance: He is ill-appearing.      Comments: Chronically ill appearing   HENT:      Right Ear: External ear normal.      Left Ear: External ear normal.      Mouth/Throat:      Mouth: Mucous membranes are moist.   Eyes:      Pupils: Pupils are equal, round, and reactive to light.   Cardiovascular:      Rate and Rhythm: Normal rate and regular rhythm.   Pulmonary:      Breath sounds: Normal breath sounds. No wheezing, rhonchi or rales.   Abdominal:      Palpations: Abdomen is soft.   Musculoskeletal:         General: Normal range of motion.      Cervical back: Normal range of motion and neck supple.   Skin:     General: Skin is warm and dry.      Capillary Refill: Capillary refill takes less than 2 seconds.   Neurological:       Mental Status: He is alert. Mental status is at baseline.       Vents:  Vent Mode: A/C (04/15/22 0737)  Ventilator Initiated: Yes (04/10/22 0343)  Set Rate: 14 BPM (04/15/22 0737)  Vt Set: 480 mL (04/15/22 0737)  Pressure Support: 10 cmH20 (04/14/22 2329)  PEEP/CPAP: 5 cmH20 (04/15/22 0737)  Oxygen Concentration (%): 35 (04/15/22 0737)  Peak Airway Pressure: 14 cmH2O (04/15/22 0737)  Plateau Pressure: 20 cmH20 (03/07/22 0500)  Total Ve: 9.5 mL (04/15/22 0737)  F/VT Ratio<105 (RSBI): (!) 37.04 (04/15/22 0737)    Lines/Drains/Airways       Central Venous Catheter Line  Duration                  Hemodialysis Catheter 12/30/21 0900 right internal jugular 105 days              Drain  Duration                  Colostomy 12/18/21 1030 Descending/sigmoid  days         Gastrostomy/Enterostomy 03/09/22 1436 Percutaneous endoscopic gastrostomy (PEG) midline feeding 36 days              Airway  Duration                  Surgical Airway 01/04/22 Shiley 101 days              Peripheral Intravenous Line  Duration                  Peripheral IV - Single Lumen 04/04/22 1430 22 G Posterior;Right Hand 10 days         Peripheral IV - Single Lumen 04/11/22 1623 18 G Anterior;Proximal;Right Forearm 3 days                    Significant Labs:    CBC/Anemia Profile:  Recent Labs   Lab 04/15/22  0551   WBC 25.38*   HGB 6.2*   HCT 18.9*   *   MCV 89.2   RDW 15.3*          Chemistries:  No results for input(s): NA, K, CL, CO2, BUN, CREATININE, CALCIUM, ALBUMIN, PROT, BILITOT, ALKPHOS, ALT, AST, GLUCOSE, MG, PHOS in the last 48 hours.    All pertinent labs within the past 24 hours have been reviewed.    Significant Imaging:  I have reviewed all pertinent imaging results/findings within the past 24 hours.    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 Will start Clindamycin today -   4/8- clinically looks better- will resume PSV today as tolerated   4/9- increased respiratory rate and tachycardic on exam.  Patient had just been turned.  This is all likely secondary to pain.  Will treat his pain and then increase his CPAP to 4 hours t.i.d. as tolerated.  4/13- to OR today  Then  -continue PSV as tolerated   4/14- Continue to increase trials as tolerated    Tachycardia  Treated anemia   - concern this may be releated to pain and pain medication withdraw - his fentanyl patch was discontinued over the weekend which he has been on for weeks.  -plan - will start PO pain medication q8hrs then decrease to q12 hours after 7 days.   4/8 - improving   4/9-heart rate up on exam.  Secondary to pain.  Will treat p.r.n. pain medication.  4/14- had some SVT yesterday that improved with treating his pain and lopressor    Pressure ulcer of sacral region, unstageable  Culture and sensitivity reviewed -now on vancomycin and zosyn   - wound care NP to evaluate to see if he may need debridement   4/13- scheduled for debridement today     Fever  2/28 - resp culture with pseudomonas and klebsiella  treat with zosyn for 7 days   Blood culture from 2/25 also growing Pseudomonas. Sensitive to zosyn. Will need to continue zosyn for at least 10 days total  - low grade fever since 3/28, CXR- unchanged and repeat CBC- WBC 20.57  - removed malpositioned LUE PICC.  - blood cultures- NGTD after 72  Hours   -4/6-  Temp 101 at noon yesterday- wound cultures with heavy growth gram neg bacilli - will go ahead and add Rocephin and continue to follow cultures    4/7- febrile this morning -  added clinda given his aspiration event   4/8 - afebrile- would cultures reviewed - sensitive to Rocephin   4/9-  wound culture sensitivity has returned; escalating to Zosyn specially given his fever overnight.  4/10--- febrile - TMAx 103.1 - will add MRSA coverage - pan culture   4/11- Tmax 99.9 after adding MRSA coverage   4/12- no fever in last 24 hours  4/14- remains afebrile    ESRD (end stage renal disease)  Started on HD 12/30   Continue with HD per nephrology      Type 2 diabetes mellitus without complication, without long-term current use of insulin  Continue with current care  accuchecks are ok    Acute blood loss anemia  No active bleeding  Hgb 6.0 on 4/11  Patient transfused 1 unit with dialysis yesterday  4/13 -H/H 7.5/22.7  4/15- hgb is 6.2 today. We are going to give another unit of PRBC's today with dialysis    Necrotizing fasciitis  S/p multiple surgeries   Last debridement for wounds was 4/13    Hypertension  BP currently ok                   Cecil Abernathy, DO  Pulmonology  [facility]

## 2022-04-15 NOTE — PLAN OF CARE
PLANS OF CARE ONGOING  Problem: Adult Inpatient Plan of Care  Goal: Plan of Care Review  Outcome: Ongoing, Progressing  Flowsheets (Taken 4/14/2022 2122)  Plan of Care Reviewed With: patient  Goal: Patient-Specific Goal (Individualized)  Outcome: Ongoing, Progressing  Goal: Absence of Hospital-Acquired Illness or Injury  Outcome: Ongoing, Progressing  Goal: Optimal Comfort and Wellbeing  Outcome: Ongoing, Progressing  Goal: Readiness for Transition of Care  Outcome: Ongoing, Progressing     Problem: Adjustment to Illness (Sepsis/Septic Shock)  Goal: Optimal Coping  Outcome: Ongoing, Progressing     Problem: Bleeding (Sepsis/Septic Shock)  Goal: Absence of Bleeding  Outcome: Ongoing, Progressing     Problem: Glycemic Control Impaired (Sepsis/Septic Shock)  Goal: Blood Glucose Level Within Desired Range  Outcome: Ongoing, Progressing     Problem: Infection Progression (Sepsis/Septic Shock)  Goal: Absence of Infection Signs and Symptoms  Outcome: Ongoing, Progressing     Problem: Nutrition Impaired (Sepsis/Septic Shock)  Goal: Optimal Nutrition Intake  Outcome: Ongoing, Progressing     Problem: Fluid and Electrolyte Imbalance (Acute Kidney Injury/Impairment)  Goal: Fluid and Electrolyte Balance  Outcome: Ongoing, Progressing     Problem: Oral Intake Inadequate (Acute Kidney Injury/Impairment)  Goal: Optimal Nutrition Intake  Outcome: Ongoing, Progressing     Problem: Renal Function Impairment (Acute Kidney Injury/Impairment)  Goal: Effective Renal Function  Outcome: Ongoing, Progressing     Problem: Infection  Goal: Absence of Infection Signs and Symptoms  Outcome: Ongoing, Progressing     Problem: Fall Injury Risk  Goal: Absence of Fall and Fall-Related Injury  Outcome: Ongoing, Progressing     Problem: Communication Impairment (Mechanical Ventilation, Invasive)  Goal: Effective Communication  Outcome: Ongoing, Progressing     Problem: Device-Related Complication Risk (Mechanical Ventilation, Invasive)  Goal: Optimal  Device Function  Outcome: Ongoing, Progressing     Problem: Inability to Wean (Mechanical Ventilation, Invasive)  Goal: Mechanical Ventilation Liberation  Outcome: Ongoing, Progressing     Problem: Nutrition Impairment (Mechanical Ventilation, Invasive)  Goal: Optimal Nutrition Delivery  Outcome: Ongoing, Progressing     Problem: Skin and Tissue Injury (Mechanical Ventilation, Invasive)  Goal: Absence of Device-Related Skin and Tissue Injury  Outcome: Ongoing, Progressing     Problem: Ventilator-Induced Lung Injury (Mechanical Ventilation, Invasive)  Goal: Absence of Ventilator-Induced Lung Injury  Outcome: Ongoing, Progressing     Problem: Communication Impairment (Artificial Airway)  Goal: Effective Communication  Outcome: Ongoing, Progressing     Problem: Device-Related Complication Risk (Artificial Airway)  Goal: Optimal Device Function  Outcome: Ongoing, Progressing     Problem: Skin and Tissue Injury (Artificial Airway)  Goal: Absence of Device-Related Skin or Tissue Injury  Outcome: Ongoing, Progressing     Problem: Skin Injury Risk Increased  Goal: Skin Health and Integrity  Outcome: Ongoing, Progressing     Problem: Device-Related Complication Risk (Hemodialysis)  Goal: Safe, Effective Therapy Delivery  Outcome: Ongoing, Progressing     Problem: Hemodynamic Instability (Hemodialysis)  Goal: Effective Tissue Perfusion  Outcome: Ongoing, Progressing     Problem: Infection (Hemodialysis)  Goal: Absence of Infection Signs and Symptoms  Outcome: Ongoing, Progressing     Problem: Diabetes Comorbidity  Goal: Blood Glucose Level Within Targeted Range  Outcome: Ongoing, Progressing     Problem: Impaired Wound Healing  Goal: Optimal Wound Healing  Outcome: Ongoing, Progressing     Problem: Gas Exchange Impaired  Goal: Optimal Gas Exchange  Outcome: Ongoing, Progressing

## 2022-04-15 NOTE — SUBJECTIVE & OBJECTIVE
Interval History: No acute events overnight. The patient is currently resting comfortably. He is currently afebrile and vital signs are stable.    Objective:     Vital Signs (Most Recent):  Temp: 98.7 °F (37.1 °C) (04/15/22 0800)  Pulse: 92 (04/15/22 0800)  Resp: (!) 23 (04/15/22 0800)  BP: (!) 123/59 (04/15/22 0800)  SpO2: 100 % (04/15/22 0800)   Vital Signs (24h Range):  Temp:  [98.2 °F (36.8 °C)-100.1 °F (37.8 °C)] 98.7 °F (37.1 °C)  Pulse:  [] 92  Resp:  [16-39] 23  SpO2:  [100 %] 100 %  BP: ()/(49-64) 123/59     Weight: 79 kg (174 lb 2.6 oz)  Body mass index is 24.29 kg/m².      Intake/Output Summary (Last 24 hours) at 4/15/2022 0919  Last data filed at 4/15/2022 0600  Gross per 24 hour   Intake 1870 ml   Output 300 ml   Net 1570 ml         Physical Exam  Vitals reviewed.   Constitutional:       General: He is not in acute distress.     Appearance: He is ill-appearing.      Comments: Chronically ill appearing   HENT:      Right Ear: External ear normal.      Left Ear: External ear normal.      Mouth/Throat:      Mouth: Mucous membranes are moist.   Eyes:      Pupils: Pupils are equal, round, and reactive to light.   Cardiovascular:      Rate and Rhythm: Normal rate and regular rhythm.   Pulmonary:      Breath sounds: Normal breath sounds. No wheezing, rhonchi or rales.   Abdominal:      Palpations: Abdomen is soft.   Musculoskeletal:         General: Normal range of motion.      Cervical back: Normal range of motion and neck supple.   Skin:     General: Skin is warm and dry.      Capillary Refill: Capillary refill takes less than 2 seconds.   Neurological:      Mental Status: He is alert. Mental status is at baseline.       Vents:  Vent Mode: A/C (04/15/22 0737)  Ventilator Initiated: Yes (04/10/22 0343)  Set Rate: 14 BPM (04/15/22 0737)  Vt Set: 480 mL (04/15/22 0737)  Pressure Support: 10 cmH20 (04/14/22 2329)  PEEP/CPAP: 5 cmH20 (04/15/22 0737)  Oxygen Concentration (%): 35 (04/15/22 0737)  Peak  Airway Pressure: 14 cmH2O (04/15/22 0737)  Plateau Pressure: 20 cmH20 (03/07/22 0500)  Total Ve: 9.5 mL (04/15/22 0737)  F/VT Ratio<105 (RSBI): (!) 37.04 (04/15/22 0737)    Lines/Drains/Airways       Central Venous Catheter Line  Duration                  Hemodialysis Catheter 12/30/21 0900 right internal jugular 105 days              Drain  Duration                  Colostomy 12/18/21 1030 Descending/sigmoid  days         Gastrostomy/Enterostomy 03/09/22 1436 Percutaneous endoscopic gastrostomy (PEG) midline feeding 36 days              Airway  Duration                  Surgical Airway 01/04/22 Shiley 101 days              Peripheral Intravenous Line  Duration                  Peripheral IV - Single Lumen 04/04/22 1430 22 G Posterior;Right Hand 10 days         Peripheral IV - Single Lumen 04/11/22 1623 18 G Anterior;Proximal;Right Forearm 3 days                    Significant Labs:    CBC/Anemia Profile:  Recent Labs   Lab 04/15/22  0551   WBC 25.38*   HGB 6.2*   HCT 18.9*   *   MCV 89.2   RDW 15.3*          Chemistries:  No results for input(s): NA, K, CL, CO2, BUN, CREATININE, CALCIUM, ALBUMIN, PROT, BILITOT, ALKPHOS, ALT, AST, GLUCOSE, MG, PHOS in the last 48 hours.    All pertinent labs within the past 24 hours have been reviewed.    Significant Imaging:  I have reviewed all pertinent imaging results/findings within the past 24 hours.

## 2022-04-15 NOTE — SUBJECTIVE & OBJECTIVE
Interval History: The patient is doing acceptable.    Review of patient's allergies indicates:  No Known Allergies  Current Facility-Administered Medications   Medication Frequency    0.9%  NaCl infusion (for blood administration) Q24H PRN    0.9%  NaCl infusion PRN    acetaminophen tablet 500 mg Q6H PRN    acetic acid 0.25 % irrigation PRN    albuterol nebulizer solution 2.5 mg Q4H PRN    albuterol-ipratropium 2.5 mg-0.5 mg/3 mL nebulizer solution 3 mL Q6H    bisacodyL EC tablet 10 mg Daily PRN    calcium gluconate 1 g in dextrose 5 % in water (D5W) 5 % 100 mL IVPB (MB+) Once    dextrose 50% injection 12.5 g PRN    diltiaZEM tablet 90 mg Q6H    glucagon (human recombinant) injection 1 mg PRN    guaiFENesin 100 mg/5 ml syrup 200 mg Q6H    heparin (porcine) injection 4,000 Units PRN    heparin (porcine) injection 5,000 Units Q8H    HYDROcodone-acetaminophen 5-325 mg per tablet 1 tablet Q12H    insulin aspart U-100 injection 1-10 Units Q6H    insulin detemir U-100 injection 25 Units QHS    metoprolol injection 5 mg Q4H PRN    morphine injection 4 mg Q4H PRN    NORepinephrine 4 mg in dextrose 5 % 250 mL infusion Continuous    ondansetron injection 8 mg Q6H PRN    pantoprazole suspension 40 mg Daily    piperacillin-tazobactam (ZOSYN) 4.5 g in dextrose 5 % in water (D5W) 5 % 100 mL IVPB (MB+) Q12H    predniSONE tablet 5 mg Daily    sevelamer carbonate pwpk 0.8 g TID WM    silver sulfADIAZINE 1% cream Daily PRN    simethicone chewable tablet 80 mg TID PRN    vancomycin - pharmacy to dose pharmacy to manage frequency       Objective:     Vital Signs (Most Recent):  Temp: 98.8 °F (37.1 °C) (04/14/22 1530)  Pulse: 94 (04/14/22 1900)  Resp: (!) 30 (04/14/22 1900)  BP: 116/61 (04/14/22 1900)  SpO2: 100 % (04/14/22 1900)  O2 Device (Oxygen Therapy): ventilator (04/14/22 1248)   Vital Signs (24h Range):  Temp:  [97.6 °F (36.4 °C)-100.1 °F (37.8 °C)] 98.8 °F (37.1 °C)  Pulse:  [] 94  Resp:  [13-39] 30  SpO2:  [100 %] 100  %  BP: ()/(50-72) 116/61     Weight: 78 kg (171 lb 15.3 oz) (04/14/22 0433)  Body mass index is 23.98 kg/m².  Body surface area is 1.98 meters squared.    I/O last 3 completed shifts:  In: 3245 [P.O.:720; Other:100; NG/GT:2050; IV Piggyback:375]  Out: 2182 [Other:1707; Stool:400; Blood:75]    Physical Exam  Vitals reviewed.   HENT:      Head: Atraumatic.   Cardiovascular:      Rate and Rhythm: Regular rhythm.      Pulses: Normal pulses.   Pulmonary:      Effort: Pulmonary effort is normal.   Abdominal:      General: Abdomen is flat.   Neurological:      Mental Status: He is alert.   Psychiatric:         Mood and Affect: Mood normal.       Significant Labs:  BMP:   Recent Labs   Lab 04/08/22  1002   GLU 72*   *   K 3.8   CL 95*   CO2 26   BUN 36*   CREATININE 1.49*   CALCIUM 8.2*     CBC:   Recent Labs   Lab 04/13/22  0252   WBC 16.10*   RBC 2.55*   HGB 7.5*   HCT 22.7*   *   MCV 89.0   MCH 29.4   MCHC 33.0        Significant Imaging:  Labs: Reviewed

## 2022-04-15 NOTE — PROGRESS NOTES
Pharmacokinetic Assessment Follow Up: IV Vancomycin    Vancomycin serum concentration assessment(s):    The random level was drawn correctly and can be used to guide therapy at this time. The measurement is below the desired definitive target range of < 20-10    Vancomycin Regimen Plan:    Vanc 1500 mg IV x 1.  A random vanc level has been ordered for 4/18 at 0600.    Drug levels (last 3 results):  Recent Labs   Lab Result Units 04/15/22  0551   Vancomycin, Random µg/mL 9.5       Pharmacy will continue to follow and monitor vancomycin.    Please contact pharmacy at extension [phone] for questions regarding this assessment.    Patient brief summary:  Og Garcia is a 66 y.o. male initiated on antimicrobial therapy with IV Vancomycin for treatment of skin & soft tissue infection    Drug Allergies:   Review of patient's allergies indicates:  No Known Allergies    Actual Body Weight:   79 kg    Renal Function:   Estimated Creatinine Clearance: 51.9 mL/min (A) (based on SCr of 1.49 mg/dL (H)).,     Dialysis Method (if applicable):  N/A    CBC (last 72 hours):  Recent Labs   Lab Result Units 04/13/22  0252 04/15/22  0551   WBC K/uL 16.10* 25.38*   Hemoglobin g/dL 7.5* 6.2*   Hematocrit % 22.7* 18.9*   Platelet Count K/uL 414* 406*   Lymphocytes % % 9.4* 10.3*   Lymphocytes, Man % % 10* 8*   Monocytes % % 8.2* 4.3   Monocytes, Man % % 9* 5   Eosinophils % % 5.1* 2.2   Eosinophils, Man % % 3 2   Basophils % % 0.2 0.3   Basophils, Man % %  --  1       Metabolic Panel (last 72 hours):  No results for input(s): SODIUM, POTASSIUM, CHLORIDE, CO2, GLUCOSE, BUN BLD, CREATININE, ALBUMIN, BILIRUBIN TOTAL, ALK PHOS, AST, ALT, MAGNESIUM, PHOSPHORUS in the last 72 hours.    Vancomycin Administrations:  vancomycin given in the last 96 hours                     vancomycin (VANCOCIN) 1,500 mg in dextrose 5 % 250 mL IVPB (mg) 1,500 mg New Bag 04/11/22 1871                    Microbiologic Results:  Microbiology Results (last 7 days)        Procedure Component Value Units Date/Time    Blood culture (site 1) [[id number]] Collected: 04/10/22 1302    Order Status: Completed Specimen: Blood Updated: 04/15/22 0615     Culture, Blood No Growth At 72 Hours    Blood culture (site 2) [[id number]] Collected: 04/10/22 1302    Order Status: Completed Specimen: Blood Updated: 04/15/22 0615     Culture, Blood No Growth At 72 Hours    Culture, Lower Respiratory [[id number]]  (Abnormal)  (Susceptibility) Collected: 04/10/22 1309    Order Status: Completed Specimen: Respiratory from Tracheal Aspirate Updated: 04/13/22 1047     Culture, Lower Respiratory Moderate Growth Pseudomonas aeruginosa     Comment: CRPA (Carbapenem-resistant Pseudomonas areuginosa)  Corrected result: Previously reported as Gram-negative Bacilli on 4/11/2022 at 1151 CDT.         Moderate Growth Klebsiella pneumoniae     Comment: ESBL (Extended spectrum beta-lactamase)  Corrected result: Previously reported as Gram-negative Bacilli on 4/12/2022 at 1438 CDT.        Gram Stain Result Many WBC seen      Too numerous to count Gram negative bacilli    Culture, Wound [[id number]]  (Abnormal)  (Susceptibility) Collected: 04/05/22 1215    Order Status: Completed Specimen: Wound from Sacral Updated: 04/09/22 0908     Culture, Wound/Abscess Heavy Growth Enterobacter aerogenes     Comment: Corrected result: Previously reported as Gram-negative Bacilli on 4/6/2022 at 1027 CDT.         Heavy Growth Proteus mirabilis      Heavy Growth Pseudomonas aeruginosa     Comment: Corrected result: Previously reported as Gram-negative Bacilli on 4/8/2022 at 0723 CDT.

## 2022-04-15 NOTE — NURSING
AT BEDREST OPENS EYES TO VERBAL STIMULI .. TOTAL CARE PT RESTING ON VENTILATOR.. NO DISTRESS NOTED , SAFETY MEASURES  ONGOING

## 2022-04-15 NOTE — DIALYSIS ROUNDING
"          Nephrology Department            NAME: Og Garcia   YOB: 1955  MRN: [id number]  NOTE DATE: 04/15/2022       Seen on dialysis. The patient is tolerating the procedure.      Patient complains:  None.      REVIEW OF SYSTEMS:  he reports no shortness of breath, nausea or vomiting. No acute changes.    VITALS:  height is 5' 11" (1.803 m) and weight is 79 kg (174 lb 2.6 oz). His axillary temperature is 98.4 °F (36.9 °C). His blood pressure is 114/59 (abnormal) and his pulse is 104. His respiration is 38 (abnormal) and oxygen saturation is 100%.  Hemodialysis documentation flowsheet reviewed with dialysis nurse, including weight and vitals.    Resting comfortably, NAD.  Lungs clear.  Heart regular, no rub.  Abdomen soft, nontender, positive bowl sounds  No edema.    No results for input(s): NA, K, CL, CO2, BUN, CREATININE, GLUCOSE, CALCIUM, PHOS in the last 168 hours.    Invalid input(s): EGFR, LABALBU  Recent Labs   Lab 04/11/22  0429 04/13/22  0252 04/15/22  0551   WBC 20.70* 16.10* 25.38*   HGB 6.0* 7.5* 6.2*   HCT 18.1* 22.7* 18.9*   * 414* 406*       ASSESSMENT/PLAN:  Continue with scheduled hemodialysis for this patient.    Edwin Pryor Jr, MD  "

## 2022-04-15 NOTE — PLAN OF CARE
Problem: Communication Impairment (Mechanical Ventilation, Invasive)  Goal: Effective Communication  Outcome: Ongoing, Progressing     Problem: Device-Related Complication Risk (Mechanical Ventilation, Invasive)  Goal: Optimal Device Function  Outcome: Ongoing, Progressing     Problem: Inability to Wean (Mechanical Ventilation, Invasive)  Goal: Mechanical Ventilation Liberation  Outcome: Ongoing, Progressing     Problem: Nutrition Impairment (Mechanical Ventilation, Invasive)  Goal: Optimal Nutrition Delivery  Outcome: Ongoing, Progressing     Problem: Skin and Tissue Injury (Mechanical Ventilation, Invasive)  Goal: Absence of Device-Related Skin and Tissue Injury  Outcome: Ongoing, Progressing     Problem: Ventilator-Induced Lung Injury (Mechanical Ventilation, Invasive)  Goal: Absence of Ventilator-Induced Lung Injury  Outcome: Ongoing, Progressing     Problem: Communication Impairment (Artificial Airway)  Goal: Effective Communication  Outcome: Ongoing, Progressing     Problem: Device-Related Complication Risk (Artificial Airway)  Goal: Optimal Device Function  Outcome: Ongoing, Progressing     Problem: Skin and Tissue Injury (Artificial Airway)  Goal: Absence of Device-Related Skin or Tissue Injury  Outcome: Ongoing, Progressing     Problem: Skin Injury Risk Increased  Goal: Skin Health and Integrity  Outcome: Ongoing, Progressing     Problem: Gas Exchange Impaired  Goal: Optimal Gas Exchange  Outcome: Ongoing, Progressing

## 2022-04-15 NOTE — ASSESSMENT & PLAN NOTE
Dialysis MWF  Continue with scheduled hemodialysis  4/12/2022  Dialysis sessions are going well.  4/13/2022 Continue with scheduled hemodialysis for this patient.  4/14/2022  Continue with dialysis

## 2022-04-15 NOTE — ASSESSMENT & PLAN NOTE
No active bleeding  Hgb 6.0 on 4/11  Patient transfused 1 unit with dialysis yesterday  4/13 -H/H 7.5/22.7  4/15- hgb is 6.2 today. We are going to give another unit of PRBC's today with dialysis

## 2022-04-15 NOTE — PLAN OF CARE
Problem: Adult Inpatient Plan of Care  Goal: Plan of Care Review  Outcome: Ongoing, Progressing     Problem: Device-Related Complication Risk (Mechanical Ventilation, Invasive)  Goal: Optimal Device Function  Outcome: Ongoing, Progressing     Problem: Inability to Wean (Mechanical Ventilation, Invasive)  Goal: Mechanical Ventilation Liberation  Outcome: Ongoing, Progressing     Problem: Nutrition Impairment (Mechanical Ventilation, Invasive)  Goal: Optimal Nutrition Delivery  Outcome: Ongoing, Progressing     Problem: Skin and Tissue Injury (Mechanical Ventilation, Invasive)  Goal: Absence of Device-Related Skin and Tissue Injury  Outcome: Ongoing, Progressing     Problem: Ventilator-Induced Lung Injury (Mechanical Ventilation, Invasive)  Goal: Absence of Ventilator-Induced Lung Injury  Outcome: Ongoing, Progressing     Problem: Communication Impairment (Artificial Airway)  Goal: Effective Communication  Outcome: Ongoing, Progressing     Problem: Device-Related Complication Risk (Artificial Airway)  Goal: Optimal Device Function  Outcome: Ongoing, Progressing     Problem: Skin and Tissue Injury (Artificial Airway)  Goal: Absence of Device-Related Skin or Tissue Injury  Outcome: Ongoing, Progressing     Problem: Gas Exchange Impaired  Goal: Optimal Gas Exchange  Outcome: Ongoing, Progressing

## 2022-04-15 NOTE — PROGRESS NOTES
[facility]  Adult Nutrition  Follow-up Note         Reason for Assessment  Reason For Assessment: RD follow-up  Nutrition Risk Screen: tube feeding or parenteral nutrition  Malnutrition  Is Patient Malnourished: No  Nutrition Diagnosis  Inadequate oral intake   related to Decreased ability to consume sufficient energy as evidenced by pt on vent    Nutrition Diagnosis Status: Chronic/ continues      Nutrition Risk  Level of Risk/Frequency of Follow-up: high   Chewing or Swallowing Difficulty?: Swallowing difficulty  Estimated/Assessed Needs  RMR (Caroline-St. Jeor Equation): 1592.13 Activity Factor: 1 Injury Factor: 1.2   Total Ve: 9.5 mL Temp: 98.7 °F (37.1 °C)Axillary  Weight Used For Calorie Calculations: 79 kg (174 lb 2.6 oz)   Energy Need Method: [place] (modified) Energy Calorie Requirements (kcal): 1869  Weight Used For Protein Calculations: 83.3 kg (183 lb 10.3 oz)  Protein Requirements: 100-125  Estimated Fluid Requirement Method: RDA Method Fluid Requirements (mL): 2105  RDA Method (mL): 1869     Nutrition Prescription / Recommendations  Recommendation/Intervention: Continue :PEG tube feeding: Nepro @ 60ml/hr; H20 flush of 50ml q 4hr  Goals: pt will meet estimated nutritional needs; wound healing, TF tolerance, wt maintenance  Nutrition Goal Status: progressing towards goal  Communication of RD Recs: reviewed with physician  Current Diet Order: PEG tube feeding: Nepro @ 60ml/hr; H20 flush of 50ml q 4hr  Nutrition Order Comments: PEG tube feeding: Nepro @ 60ml/hr; H20 flush of 100ml q 4hr  Current Nutrition Support Formula Ordered: Nepro  Current Nutrition Support Rate Ordered: 60 (ml)  Current Nutrition Support Frequency Ordered: hourly  Recommended Diet: Enteral Nutrition PEG tube feeding: Nepro @ 60ml/hr; H20 flush of 50ml q 4hr  Recommended Oral Supplement: No Oral Supplements  Is Nutrition Support Recommended: yes  Is Education Recommended: No  Monitor and Evaluation  % current  Intake: Enteral Nutrition at goal  % intake to meet estimated needs: Enteral Nutrition   Food and Nutrient Intake: enteral nutrition intake  Food and Nutrient Adminstration: enteral and parenteral nutrition administration  Anthropometric Measurements: height/length, body mass index, weight, weight change  Biochemical Data, Medical Tests and Procedures: electrolyte and renal panel, glucose/endocrine profile  Nutrition-Focused Physical Findings: skin  Enteral Calories (kcal): 2592  Enteral Protein (gm): 115  Enteral (Free Water) Fluid (mL): 1037  Free Water Flush Fluid (mL): 300  Parenteral Calories (kcal): 845  Parenteral Protein (gm): 48  Parenteral Fluid (mL): 960  Lipid Calories (kcals): 500 kcals  Other Calories (kcal): 528 (propofol)  Total Calories (kcal): 2160  Total Calories (kcal/kg): 2612  % Kcal Needs: 100  Total Protein (gm): 135  % Protein Needs: 100  IV Fluid (mL): 1764  Total Fluid Intake (mL): 1337  Energy Calories Required: meeting needs  Protein Required: meeting needs  Fluid Required: meeting needs  Tolerance: tolerating  Current Medical Diagnosis and Past Medical History  Diagnosis: gastrointestinal disease, infection/sepsis  Past Medical History:   Diagnosis Date    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  Last A1c: No results found for: HGBA1C  Lab Results   Component Value Date    RBC 2.12 (L) 04/15/2022    HGB 6.2 (L) 04/15/2022    HCT 18.9 (L) 04/15/2022    MCV 89.2 04/15/2022    MCH 29.2 04/15/2022    MCHC 32.8 04/15/2022     Pertinent Labs Reviewed: reviewed  Pertinent Labs  "Comments: Hct 18.1, Na 133, Cl 95, BUN 36, Creat 1.49, Alb 1.3  Pertinent Medications Reviewed: reviewed  Pertinent Medications Comments: heparin, insulin  Anthropometrics  Temp: 98.7 °F (37.1 °C)  Height Method: Stated  Height: 5' 11" (180.3 cm)  Height (inches): 71 in  Weight Method: Bed Scale  Weight: 79 kg (174 lb 2.6 oz)  Weight (lb): 174.17 lb  Ideal Body Weight (IBW), Male: 172 lb  % Ideal Body Weight, Male (lb): 106.77 %  BMI (Calculated): 24.3  BMI Grade: 25 - 29.9 - overweight     Nutrition by Nursing  Diet/Nutrition Received: tube feeding  Intake (%): 100%  Diet/Feeding Assistance: total feed  Diet/Feeding Tolerance: good  Last Bowel Movement: 04/15/22  [REMOVED]      NG/OG Tube [employer] [place] sump 18 Fr. Left nostril-Feeding Type: continuous, by pump       Gastrostomy/Enterostomy 03/09/22 1436 Percutaneous endoscopic gastrostomy (PEG) midline feeding-Feeding Type: continuous, by pump  [REMOVED]      NG/OG Tube [employer] [place] sump 18 Fr. Left nostril-Current Rate (mL/hr): 50 mL/hr       Gastrostomy/Enterostomy 03/09/22 1436 Percutaneous endoscopic gastrostomy (PEG) midline feeding-Current Rate (mL/hr): 60 mL/hr  [REMOVED]      NG/OG Tube [employer] [place] sump 18 Fr. Left nostril-Goal Rate (mL/hr): 75 mL/hr       Gastrostomy/Enterostomy 03/09/22 1436 Percutaneous endoscopic gastrostomy (PEG) midline feeding-Goal Rate (mL/hr): 60 mL/hr  [REMOVED]      NG/OG Tube [employer] sump 18 Fr. Left nostril-Formula Name: nepro 1.8       Gastrostomy/Enterostomy 03/09/22 1436 Percutaneous endoscopic gastrostomy (PEG) midline feeding-Formula Name: nepro  Nutrition Follow-Up  RD Follow-up?: Yes  Assessment and Plan  No new Assessment & Plan notes have been filed under this hospital service since the last note was generated.  Service: Nutrition       "

## 2022-04-15 NOTE — PROGRESS NOTES
Placed patient on CPAP at 1025. Patient tolerated fine until 1340, he was taken off due to low Vt and becoming tachycardic. Placed back on rate. Did not attempt second trial due to patient being in dialysis.

## 2022-04-15 NOTE — PLAN OF CARE
Problem: Adult Inpatient Plan of Care  Goal: Optimal Comfort and Wellbeing  Outcome: Ongoing, Progressing     Problem: Nutrition Impaired (Sepsis/Septic Shock)  Goal: Optimal Nutrition Intake  Outcome: Ongoing, Progressing     Problem: Adult Inpatient Plan of Care  Goal: Plan of Care Review  Outcome: Ongoing, Not Progressing  Goal: Patient-Specific Goal (Individualized)  Outcome: Ongoing, Not Progressing  Goal: Absence of Hospital-Acquired Illness or Injury  Outcome: Ongoing, Not Progressing  Goal: Readiness for Transition of Care  Outcome: Ongoing, Not Progressing     Problem: Bleeding (Sepsis/Septic Shock)  Goal: Absence of Bleeding  Outcome: Ongoing, Not Progressing     Problem: Glycemic Control Impaired (Sepsis/Septic Shock)  Goal: Blood Glucose Level Within Desired Range  Outcome: Ongoing, Not Progressing     Problem: Fluid and Electrolyte Imbalance (Acute Kidney Injury/Impairment)  Goal: Fluid and Electrolyte Balance  Outcome: Ongoing, Not Progressing

## 2022-04-15 NOTE — PROGRESS NOTES
[facility]  Nephrology  Progress Note    Patient Name: Og Garcia  MRN: [id number]  Admission Date: 12/23/2021  Hospital Length of Stay: 112 days  Attending Provider: Cecil Abernathy DO   Primary Care Physician: Hector Arndt DNP, FNP-C  Principal Problem:Denice gangrene    Subjective:     HPI: The patient is known from the previous nephrology consult at [facility].  He is no at Specialty and continues to need dialysis support.      Interval History: The patient is doing acceptable.    Review of patient's allergies indicates:  No Known Allergies  Current Facility-Administered Medications   Medication Frequency    0.9%  NaCl infusion (for blood administration) Q24H PRN    0.9%  NaCl infusion PRN    acetaminophen tablet 500 mg Q6H PRN    acetic acid 0.25 % irrigation PRN    albuterol nebulizer solution 2.5 mg Q4H PRN    albuterol-ipratropium 2.5 mg-0.5 mg/3 mL nebulizer solution 3 mL Q6H    bisacodyL EC tablet 10 mg Daily PRN    calcium gluconate 1 g in dextrose 5 % in water (D5W) 5 % 100 mL IVPB (MB+) Once    dextrose 50% injection 12.5 g PRN    diltiaZEM tablet 90 mg Q6H    glucagon (human recombinant) injection 1 mg PRN    guaiFENesin 100 mg/5 ml syrup 200 mg Q6H    heparin (porcine) injection 4,000 Units PRN    heparin (porcine) injection 5,000 Units Q8H    HYDROcodone-acetaminophen 5-325 mg per tablet 1 tablet Q12H    insulin aspart U-100 injection 1-10 Units Q6H    insulin detemir U-100 injection 25 Units QHS    metoprolol injection 5 mg Q4H PRN    morphine injection 4 mg Q4H PRN    NORepinephrine 4 mg in dextrose 5 % 250 mL infusion Continuous    ondansetron injection 8 mg Q6H PRN    pantoprazole suspension 40 mg Daily    piperacillin-tazobactam (ZOSYN) 4.5 g in dextrose 5 % in water (D5W) 5 % 100 mL IVPB (MB+) Q12H    predniSONE tablet 5 mg Daily    sevelamer carbonate pwpk 0.8 g TID WM    silver sulfADIAZINE 1% cream Daily PRN    simethicone chewable  tablet 80 mg TID PRN    vancomycin - pharmacy to dose pharmacy to manage frequency       Objective:     Vital Signs (Most Recent):  Temp: 98.8 °F (37.1 °C) (04/14/22 1530)  Pulse: 94 (04/14/22 1900)  Resp: (!) 30 (04/14/22 1900)  BP: 116/61 (04/14/22 1900)  SpO2: 100 % (04/14/22 1900)  O2 Device (Oxygen Therapy): ventilator (04/14/22 1248)   Vital Signs (24h Range):  Temp:  [97.6 °F (36.4 °C)-100.1 °F (37.8 °C)] 98.8 °F (37.1 °C)  Pulse:  [] 94  Resp:  [13-39] 30  SpO2:  [100 %] 100 %  BP: ()/(50-72) 116/61     Weight: 78 kg (171 lb 15.3 oz) (04/14/22 0433)  Body mass index is 23.98 kg/m².  Body surface area is 1.98 meters squared.    I/O last 3 completed shifts:  In: 3245 [P.O.:720; Other:100; NG/GT:2050; IV Piggyback:375]  Out: 2182 [Other:1707; Stool:400; Blood:75]    Physical Exam  Vitals reviewed.   HENT:      Head: Atraumatic.   Cardiovascular:      Rate and Rhythm: Regular rhythm.      Pulses: Normal pulses.   Pulmonary:      Effort: Pulmonary effort is normal.   Abdominal:      General: Abdomen is flat.   Neurological:      Mental Status: He is alert.   Psychiatric:         Mood and Affect: Mood normal.       Significant Labs:  BMP:   Recent Labs   Lab 04/08/22  1002   GLU 72*   *   K 3.8   CL 95*   CO2 26   BUN 36*   CREATININE 1.49*   CALCIUM 8.2*     CBC:   Recent Labs   Lab 04/13/22  0252   WBC 16.10*   RBC 2.55*   HGB 7.5*   HCT 22.7*   *   MCV 89.0   MCH 29.4   MCHC 33.0        Significant Imaging:  Labs: Reviewed    Assessment/Plan:     ESRD (end stage renal disease)  Dialysis MWF  Continue with scheduled hemodialysis  4/12/2022  Dialysis sessions are going well.  4/13/2022 Continue with scheduled hemodialysis for this patient.  4/14/2022  Continue with dialysis    Hypertension  Blood pressure borderline low.  He is requiring diltiazem for Afib          Thank you for your consult. I will follow-up with patient. Please contact us if you have any additional questions.    Edwin  Roya Reilly MD  Nephrology  [facility]

## 2022-04-16 NOTE — PROGRESS NOTES
[facility]  Nephrology  Progress Note    Patient Name: Og Garcia  MRN: [id number]  Admission Date: 12/23/2021  Hospital Length of Stay: 114 days  Attending Provider: Cecil Abernathy DO   Primary Care Physician: Hector Arndt DNP, FNP-C  Principal Problem:Denice gangrene    Subjective:     HPI: The patient is known from the previous nephrology consult at [facility].  He is no at Specialty and continues to need dialysis support.      Interval History: The patient is resting.  No acute changes.  He tolerated dialysis on yesterday.      Review of patient's allergies indicates:  No Known Allergies  Current Facility-Administered Medications   Medication Frequency    0.9%  NaCl infusion (for blood administration) Q24H PRN    0.9%  NaCl infusion PRN    acetaminophen tablet 500 mg Q6H PRN    albuterol nebulizer solution 2.5 mg Q4H PRN    albuterol-ipratropium 2.5 mg-0.5 mg/3 mL nebulizer solution 3 mL Q6H    bisacodyL EC tablet 10 mg Daily PRN    calcium gluconate 1 g in dextrose 5 % in water (D5W) 5 % 100 mL IVPB (MB+) Once    dextrose 50% injection 12.5 g PRN    diltiaZEM tablet 90 mg Q6H    glucagon (human recombinant) injection 1 mg PRN    guaiFENesin 100 mg/5 ml syrup 200 mg Q6H    heparin (porcine) injection 4,000 Units PRN    heparin (porcine) injection 5,000 Units Q8H    HYDROcodone-acetaminophen 5-325 mg per tablet 1 tablet Q12H    insulin aspart U-100 injection 1-10 Units Q6H    insulin detemir U-100 injection 25 Units QHS    metoprolol injection 5 mg Q4H PRN    morphine injection 4 mg Q4H PRN    NORepinephrine 4 mg in dextrose 5 % 250 mL infusion Continuous    ondansetron injection 8 mg Q6H PRN    pantoprazole suspension 40 mg Daily    predniSONE tablet 5 mg Daily    sevelamer carbonate pwpk 0.8 g TID WM    silver sulfADIAZINE 1% cream Daily PRN    simethicone chewable tablet 80 mg TID PRN    vancomycin - pharmacy to dose pharmacy to manage frequency        Objective:     Vital Signs (Most Recent):  Temp: 99.6 °F (37.6 °C) (04/16/22 0300)  Pulse: 102 (04/16/22 0520)  Resp: (!) 28 (04/16/22 0520)  BP: 135/65 (04/16/22 0300)  SpO2: 100 % (04/16/22 0520)  O2 Device (Oxygen Therapy): ventilator (04/16/22 0520)   Vital Signs (24h Range):  Temp:  [98 °F (36.7 °C)-99.6 °F (37.6 °C)] 99.6 °F (37.6 °C)  Pulse:  [] 102  Resp:  [14-38] 28  SpO2:  [100 %] 100 %  BP: (101-142)/(55-71) 135/65     Weight: 80 kg (176 lb 5.9 oz) (04/16/22 0600)  Body mass index is 24.6 kg/m².  Body surface area is 2 meters squared.    I/O last 3 completed shifts:  In: 4110 [P.O.:180; Blood:300; Other:800; NG/GT:2730; IV Piggyback:100]  Out: 2025 [Other:1500; Stool:525]    Physical Exam  Vitals reviewed.   HENT:      Head: Atraumatic.   Cardiovascular:      Rate and Rhythm: Normal rate and regular rhythm.   Pulmonary:      Effort: Pulmonary effort is normal.      Comments: ventilated  Abdominal:      General: Abdomen is flat.   Skin:     General: Skin is warm.   Neurological:      Mental Status: He is alert.       Significant Labs:  BMP: No results for input(s): GLU, NA, K, CL, CO2, BUN, CREATININE, CALCIUM, MG in the last 168 hours.  CBC:   Recent Labs   Lab 04/15/22  0551   WBC 25.38*   RBC 2.12*   HGB 6.2*   HCT 18.9*   *   MCV 89.2   MCH 29.2   MCHC 32.8        Significant Imaging:  Labs: Reviewed    Assessment/Plan:     * Denice gangrene  Continue wound care  Managed by wound care      ESRD (end stage renal disease)  Dialysis MWF  Continue with scheduled hemodialysis  4/12/2022  Dialysis sessions are going well.  4/13/2022 Continue with scheduled hemodialysis for this patient.  4/14/2022  Continue with dialysis  4/16/2022 Chronic condition which is stable.    Hypertension  Blood pressure borderline low.  He is requiring diltiazem for Afib    Chronic condition        Thank you for your consult. I will follow-up with patient. Please contact us if you have any additional  questions.    Edwin Pryor Jr, MD  Nephrology  [facility]

## 2022-04-16 NOTE — PLAN OF CARE
Problem: Adult Inpatient Plan of Care  Goal: Plan of Care Review  Outcome: Ongoing, Progressing  Goal: Patient-Specific Goal (Individualized)  Outcome: Ongoing, Progressing  Goal: Absence of Hospital-Acquired Illness or Injury  Outcome: Ongoing, Progressing     Problem: Communication Impairment (Mechanical Ventilation, Invasive)  Goal: Effective Communication  Outcome: Ongoing, Progressing     Problem: Device-Related Complication Risk (Mechanical Ventilation, Invasive)  Goal: Optimal Device Function  Outcome: Ongoing, Progressing     Problem: Inability to Wean (Mechanical Ventilation, Invasive)  Goal: Mechanical Ventilation Liberation  Outcome: Ongoing, Progressing     Problem: Nutrition Impairment (Mechanical Ventilation, Invasive)  Goal: Optimal Nutrition Delivery  Outcome: Ongoing, Progressing     Problem: Skin and Tissue Injury (Mechanical Ventilation, Invasive)  Goal: Absence of Device-Related Skin and Tissue Injury  Outcome: Ongoing, Progressing     Problem: Communication Impairment (Artificial Airway)  Goal: Effective Communication  Outcome: Ongoing, Progressing     Problem: Device-Related Complication Risk (Artificial Airway)  Goal: Optimal Device Function  Outcome: Ongoing, Progressing     Problem: Skin and Tissue Injury (Artificial Airway)  Goal: Absence of Device-Related Skin or Tissue Injury  Outcome: Ongoing, Progressing     Problem: Gas Exchange Impaired  Goal: Optimal Gas Exchange  Outcome: Ongoing, Progressing

## 2022-04-16 NOTE — PLAN OF CARE
Problem: Adult Inpatient Plan of Care  Goal: Plan of Care Review  Outcome: Ongoing, Progressing  Goal: Absence of Hospital-Acquired Illness or Injury  Outcome: Ongoing, Progressing  Goal: Optimal Comfort and Wellbeing  Outcome: Ongoing, Progressing     Problem: Bleeding (Sepsis/Septic Shock)  Goal: Absence of Bleeding  Outcome: Ongoing, Progressing     Problem: Infection Progression (Sepsis/Septic Shock)  Goal: Absence of Infection Signs and Symptoms  Outcome: Ongoing, Progressing     Problem: Oral Intake Inadequate (Acute Kidney Injury/Impairment)  Goal: Optimal Nutrition Intake  Outcome: Ongoing, Progressing     Problem: Fall Injury Risk  Goal: Absence of Fall and Fall-Related Injury  Outcome: Ongoing, Progressing     Problem: Inability to Wean (Mechanical Ventilation, Invasive)  Goal: Mechanical Ventilation Liberation  Outcome: Ongoing, Progressing     Problem: Nutrition Impairment (Mechanical Ventilation, Invasive)  Goal: Optimal Nutrition Delivery  Outcome: Ongoing, Progressing     Problem: Adult Inpatient Plan of Care  Goal: Patient-Specific Goal (Individualized)  Outcome: Ongoing, Not Progressing  Goal: Readiness for Transition of Care  Outcome: Ongoing, Not Progressing     Problem: Glycemic Control Impaired (Sepsis/Septic Shock)  Goal: Blood Glucose Level Within Desired Range  Outcome: Ongoing, Not Progressing     Problem: Renal Function Impairment (Acute Kidney Injury/Impairment)  Goal: Effective Renal Function  Outcome: Ongoing, Not Progressing     Problem: Communication Impairment (Mechanical Ventilation, Invasive)  Goal: Effective Communication  Outcome: Ongoing, Not Progressing

## 2022-04-16 NOTE — ASSESSMENT & PLAN NOTE
Was doing trach collar over the weekend but had to be placed back on the vent earlier this week   - will treat his pain- hopefully this will improve his HR and then start back on weaning trails   4/7- aspirated yesterday - febrile this morning. Will start Clindamycin today -   4/8- clinically looks better- will resume PSV today as tolerated   4/9- increased respiratory rate and tachycardic on exam.  Patient had just been turned.  This is all likely secondary to pain.  Will treat his pain and then increase his CPAP to 4 hours t.i.d. as tolerated.  4/13- to OR today  Then  -continue PSV as tolerated   4/16- Continue to increase trials as tolerated

## 2022-04-16 NOTE — SUBJECTIVE & OBJECTIVE
Interval History: No acute events overnight. The patient is currently resting comfortably. He is currently afebrile and vital signs are stable.    Objective:     Vital Signs (Most Recent):  Temp: 99.6 °F (37.6 °C) (04/16/22 0300)  Pulse: 102 (04/16/22 0520)  Resp: (!) 28 (04/16/22 0520)  BP: 135/65 (04/16/22 0300)  SpO2: 100 % (04/16/22 0520)   Vital Signs (24h Range):  Temp:  [98 °F (36.7 °C)-99.6 °F (37.6 °C)] 99.6 °F (37.6 °C)  Pulse:  [] 102  Resp:  [14-38] 28  SpO2:  [100 %] 100 %  BP: (101-142)/(55-71) 135/65     Weight: 80 kg (176 lb 5.9 oz)  Body mass index is 24.6 kg/m².      Intake/Output Summary (Last 24 hours) at 4/16/2022 0925  Last data filed at 4/16/2022 0600  Gross per 24 hour   Intake 2960 ml   Output 1800 ml   Net 1160 ml         Physical Exam  Vitals reviewed.   Constitutional:       General: He is not in acute distress.     Appearance: He is ill-appearing.      Comments: Chronically ill appearing   HENT:      Right Ear: External ear normal.      Left Ear: External ear normal.      Mouth/Throat:      Mouth: Mucous membranes are moist.   Eyes:      Pupils: Pupils are equal, round, and reactive to light.   Cardiovascular:      Rate and Rhythm: Normal rate and regular rhythm.   Pulmonary:      Breath sounds: Normal breath sounds. No wheezing, rhonchi or rales.   Abdominal:      Palpations: Abdomen is soft.   Musculoskeletal:         General: Normal range of motion.      Cervical back: Normal range of motion and neck supple.   Skin:     General: Skin is warm and dry.      Capillary Refill: Capillary refill takes less than 2 seconds.   Neurological:      Mental Status: He is alert. Mental status is at baseline.       Vents:  Vent Mode: A/C (04/16/22 0520)  Ventilator Initiated: Yes (04/10/22 0343)  Set Rate: 14 BPM (04/16/22 0520)  Vt Set: 480 mL (04/16/22 0520)  Pressure Support: 10 cmH20 (04/15/22 2210)  PEEP/CPAP: 5 cmH20 (04/16/22 0520)  Oxygen Concentration (%): 35 (04/16/22 0520)  Peak  Airway Pressure: 28 cmH2O (04/16/22 0520)  Plateau Pressure: 20 cmH20 (03/07/22 0500)  Total Ve: 14 mL (04/16/22 0520)  F/VT Ratio<105 (RSBI): (!) 64.81 (04/16/22 0520)    Lines/Drains/Airways       Central Venous Catheter Line  Duration                  Hemodialysis Catheter 12/30/21 0900 right internal jugular 106 days              Drain  Duration                  Colostomy 12/18/21 1030 Descending/sigmoid  days         Gastrostomy/Enterostomy 03/09/22 1436 Percutaneous endoscopic gastrostomy (PEG) midline feeding 37 days              Airway  Duration                  Surgical Airway 01/04/22 Shiley 102 days              Peripheral Intravenous Line  Duration                  Peripheral IV - Single Lumen 04/04/22 1430 22 G Posterior;Right Hand 11 days         Peripheral IV - Single Lumen 04/11/22 1623 18 G Anterior;Proximal;Right Forearm 4 days                    Significant Labs:    CBC/Anemia Profile:  Recent Labs   Lab 04/15/22  0551   WBC 25.38*   HGB 6.2*   HCT 18.9*   *   MCV 89.2   RDW 15.3*          Chemistries:  No results for input(s): NA, K, CL, CO2, BUN, CREATININE, CALCIUM, ALBUMIN, PROT, BILITOT, ALKPHOS, ALT, AST, GLUCOSE, MG, PHOS in the last 48 hours.    All pertinent labs within the past 24 hours have been reviewed.    Significant Imaging:  I have reviewed all pertinent imaging results/findings within the past 24 hours.

## 2022-04-16 NOTE — ASSESSMENT & PLAN NOTE
No active bleeding  Hgb 6.0 on 4/11  Patient transfused 1 unit with dialysis yesterday  4/13 -H/H 7.5/22.7  4/15- hgb is 6.2 today. We are going to give another unit of PRBC's today with dialysis  4/16 post transfusion cbc is pending

## 2022-04-16 NOTE — SUBJECTIVE & OBJECTIVE
Interval History: The patient is resting.  No acute changes.  He tolerated dialysis on yesterday.      Review of patient's allergies indicates:  No Known Allergies  Current Facility-Administered Medications   Medication Frequency    0.9%  NaCl infusion (for blood administration) Q24H PRN    0.9%  NaCl infusion PRN    acetaminophen tablet 500 mg Q6H PRN    albuterol nebulizer solution 2.5 mg Q4H PRN    albuterol-ipratropium 2.5 mg-0.5 mg/3 mL nebulizer solution 3 mL Q6H    bisacodyL EC tablet 10 mg Daily PRN    calcium gluconate 1 g in dextrose 5 % in water (D5W) 5 % 100 mL IVPB (MB+) Once    dextrose 50% injection 12.5 g PRN    diltiaZEM tablet 90 mg Q6H    glucagon (human recombinant) injection 1 mg PRN    guaiFENesin 100 mg/5 ml syrup 200 mg Q6H    heparin (porcine) injection 4,000 Units PRN    heparin (porcine) injection 5,000 Units Q8H    HYDROcodone-acetaminophen 5-325 mg per tablet 1 tablet Q12H    insulin aspart U-100 injection 1-10 Units Q6H    insulin detemir U-100 injection 25 Units QHS    metoprolol injection 5 mg Q4H PRN    morphine injection 4 mg Q4H PRN    NORepinephrine 4 mg in dextrose 5 % 250 mL infusion Continuous    ondansetron injection 8 mg Q6H PRN    pantoprazole suspension 40 mg Daily    predniSONE tablet 5 mg Daily    sevelamer carbonate pwpk 0.8 g TID WM    silver sulfADIAZINE 1% cream Daily PRN    simethicone chewable tablet 80 mg TID PRN    vancomycin - pharmacy to dose pharmacy to manage frequency       Objective:     Vital Signs (Most Recent):  Temp: 99.6 °F (37.6 °C) (04/16/22 0300)  Pulse: 102 (04/16/22 0520)  Resp: (!) 28 (04/16/22 0520)  BP: 135/65 (04/16/22 0300)  SpO2: 100 % (04/16/22 0520)  O2 Device (Oxygen Therapy): ventilator (04/16/22 0520)   Vital Signs (24h Range):  Temp:  [98 °F (36.7 °C)-99.6 °F (37.6 °C)] 99.6 °F (37.6 °C)  Pulse:  [] 102  Resp:  [14-38] 28  SpO2:  [100 %] 100 %  BP: (101-142)/(55-71) 135/65     Weight: 80 kg (176 lb 5.9 oz) (04/16/22 0600)  Body  mass index is 24.6 kg/m².  Body surface area is 2 meters squared.    I/O last 3 completed shifts:  In: 4110 [P.O.:180; Blood:300; Other:800; NG/GT:2730; IV Piggyback:100]  Out: 2025 [Other:1500; Stool:525]    Physical Exam  Vitals reviewed.   HENT:      Head: Atraumatic.   Cardiovascular:      Rate and Rhythm: Normal rate and regular rhythm.   Pulmonary:      Effort: Pulmonary effort is normal.      Comments: ventilated  Abdominal:      General: Abdomen is flat.   Skin:     General: Skin is warm.   Neurological:      Mental Status: He is alert.       Significant Labs:  BMP: No results for input(s): GLU, NA, K, CL, CO2, BUN, CREATININE, CALCIUM, MG in the last 168 hours.  CBC:   Recent Labs   Lab 04/15/22  0551   WBC 25.38*   RBC 2.12*   HGB 6.2*   HCT 18.9*   *   MCV 89.2   MCH 29.2   MCHC 32.8        Significant Imaging:  Labs: Reviewed

## 2022-04-16 NOTE — ASSESSMENT & PLAN NOTE
Dialysis MWF  Continue with scheduled hemodialysis  4/12/2022  Dialysis sessions are going well.  4/13/2022 Continue with scheduled hemodialysis for this patient.  4/14/2022  Continue with dialysis  4/16/2022 Chronic condition which is stable.

## 2022-04-16 NOTE — PROGRESS NOTES
Placed patient back on the vent via ACV with previous settings for rest.  Patient did 4 hours of CPAP 5, PSV 10, FIO2 35% and did fine with no complications.

## 2022-04-16 NOTE — PROGRESS NOTES
IP Acute Physical Therapy Treatment  Plan of Care Note    BASELINE STATUS COMPARED WITH CURRENT STATUS: Patient presents near most recent baseline which was modified independent-supv with mobility using ww.   Patient with hx adenocarcinoma of right lung with bone mets including lytic bone lesion of right femur, recurrent UTI and urinary retention admitted for UTI. Resides with spouse in house with 2 steps to enter. Per documentation from recent hospitalization at [facility] last month, spouse providing near 24 hour supervision due to memory loss and known cognitive impairments.   ASSESSMENT  Emphasis of session included reinforcement bed mobility, transfer safety and activity pacing with gait training in vega with ww and stair training.   Patient's overall level of function is currently min assist for sit to supine bed mobility, modified independent supine to sit with increased time to manage RLE. Supv for transfer cueing and safety with gait with ww with pt demonstrating decreased UE support needed and tolerance to partial step through pattern with distance 150 feet.  Pt able to verbalize sequence for stair training however needs manual cues for recall during stair activity with contact guard assist.  Pt reports wife always reminds him and assists him with step into home.  Pacing needed with all activity for pain control and mild SOB, sp02 RA 98-99%, . Current D/C plan is return home with wife and home hospice after course of IV meds in hospital.     Recommendations and Plan:     Recommendation for Discharge: PT: Home (12/14/17 1449)    Treatment Plan for Next Session: reinforce transfer safety, pacing with gait training with ww, review stoop with ww vs step with rail      Frequency Comments: X, M-F (H) ML, PK     Precautions:  Precautions  Other Precautions: hx lung CA with bone mets including R proximal femur, s/p R hip ORIF 12/2016; recurrent UTI (12/14/17 9598)    AM-PAC Outcomes - Basic Mobility Domain  How  [facility]  Pulmonology  Progress Note    Patient Name: Og Garcia  MRN: [id number]  Admission Date: 12/23/2021  Hospital Length of Stay: 114 days  Code Status: Prior  Attending Provider: Cecil Abernathy DO  Primary Care Provider: Hector Arndt DNP, FNP-C   Principal Problem: Denice gangrene    Subjective:     Interval History: No acute events overnight. The patient is currently resting comfortably. He is currently afebrile and vital signs are stable.    Objective:     Vital Signs (Most Recent):  Temp: 99.6 °F (37.6 °C) (04/16/22 0300)  Pulse: 102 (04/16/22 0520)  Resp: (!) 28 (04/16/22 0520)  BP: 135/65 (04/16/22 0300)  SpO2: 100 % (04/16/22 0520)   Vital Signs (24h Range):  Temp:  [98 °F (36.7 °C)-99.6 °F (37.6 °C)] 99.6 °F (37.6 °C)  Pulse:  [] 102  Resp:  [14-38] 28  SpO2:  [100 %] 100 %  BP: (101-142)/(55-71) 135/65     Weight: 80 kg (176 lb 5.9 oz)  Body mass index is 24.6 kg/m².      Intake/Output Summary (Last 24 hours) at 4/16/2022 0925  Last data filed at 4/16/2022 0600  Gross per 24 hour   Intake 2960 ml   Output 1800 ml   Net 1160 ml         Physical Exam  Vitals reviewed.   Constitutional:       General: He is not in acute distress.     Appearance: He is ill-appearing.      Comments: Chronically ill appearing   HENT:      Right Ear: External ear normal.      Left Ear: External ear normal.      Mouth/Throat:      Mouth: Mucous membranes are moist.   Eyes:      Pupils: Pupils are equal, round, and reactive to light.   Cardiovascular:      Rate and Rhythm: Normal rate and regular rhythm.   Pulmonary:      Breath sounds: Normal breath sounds. No wheezing, rhonchi or rales.   Abdominal:      Palpations: Abdomen is soft.   Musculoskeletal:         General: Normal range of motion.      Cervical back: Normal range of motion and neck supple.   Skin:     General: Skin is warm and dry.      Capillary Refill: Capillary refill takes less than 2 seconds.   Neurological:       Mental Status: He is alert. Mental status is at baseline.       Vents:  Vent Mode: A/C (04/16/22 0520)  Ventilator Initiated: Yes (04/10/22 0343)  Set Rate: 14 BPM (04/16/22 0520)  Vt Set: 480 mL (04/16/22 0520)  Pressure Support: 10 cmH20 (04/15/22 2210)  PEEP/CPAP: 5 cmH20 (04/16/22 0520)  Oxygen Concentration (%): 35 (04/16/22 0520)  Peak Airway Pressure: 28 cmH2O (04/16/22 0520)  Plateau Pressure: 20 cmH20 (03/07/22 0500)  Total Ve: 14 mL (04/16/22 0520)  F/VT Ratio<105 (RSBI): (!) 64.81 (04/16/22 0520)    Lines/Drains/Airways       Central Venous Catheter Line  Duration                  Hemodialysis Catheter 12/30/21 0900 right internal jugular 106 days              Drain  Duration                  Colostomy 12/18/21 1030 Descending/sigmoid  days         Gastrostomy/Enterostomy 03/09/22 1436 Percutaneous endoscopic gastrostomy (PEG) midline feeding 37 days              Airway  Duration                  Surgical Airway 01/04/22 Shiley 102 days              Peripheral Intravenous Line  Duration                  Peripheral IV - Single Lumen 04/04/22 1430 22 G Posterior;Right Hand 11 days         Peripheral IV - Single Lumen 04/11/22 1623 18 G Anterior;Proximal;Right Forearm 4 days                    Significant Labs:    CBC/Anemia Profile:  Recent Labs   Lab 04/15/22  0551   WBC 25.38*   HGB 6.2*   HCT 18.9*   *   MCV 89.2   RDW 15.3*          Chemistries:  No results for input(s): NA, K, CL, CO2, BUN, CREATININE, CALCIUM, ALBUMIN, PROT, BILITOT, ALKPHOS, ALT, AST, GLUCOSE, MG, PHOS in the last 48 hours.    All pertinent labs within the past 24 hours have been reviewed.    Significant Imaging:  I have reviewed all pertinent imaging results/findings within the past 24 hours.    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 *  Task Score  Norm   1. Turning from back to side while in flat bed without use of bedrails? 4 - None   2. Moving from lying on back to sittin - None   3. Moving to and from a bed to a chair (including wheelchair) 3 - A Little   4. Standing up from a chair using your arms 3 - A Little   5. To walk in a hospital room? 3 - A Little   6. Climbing 3-5 steps with a railing?  3 - A Little   Raw Score:    Converted Score: 43.99 (Raw score = 20)   G code conversion CJ: 20-39% impairment (Raw score: 19-22)     Converted score >42.9 predictive of discharge to home  *Score based on clinical judgment/expected performance, may not have been performed during this session  Scoring Guidelines  1) Patient may use assistive devices unless otherwise indicated in question  2) Do not consider help for management of medical devices only (IV poles, catheters, NG, etc) as part of assist level  3) If activity was not observed and patient unable to do the activity, select \"Total\" .  If the patient can do the activity but was not directly observed, use profession judgment to determine how much assistance would be needed.      Below is key objective and subjective information as of the date/time noted.  For further details and goals, please refer to the PT Assess/Treat/Goals flowsheet.    Diagnosis:  1. Malignant neoplasm of right lung, unspecified part of lung (CMS/HCC)    2. Muscle weakness    3. Delirium    4. Goals of care, counseling/discussion    5. Slow transit constipation        Co-morbidities:   Patient Active Problem List   Diagnosis   • Osteoarthrosis, shoulder region   • Allergic rhinitis   • Rash   • BPH (benign prostatic hyperplasia)   • Other ventral hernia without mention of obstruction or gangrene   • Neck pain   • Primary osteoarthritis of right knee   • Cervical radiculopathy   • Hearing loss   • Kidney cysts   • Osteoarthritis of right shoulder   •    Currently stable with no acute issues      Chronic respiratory failure  Was doing trach collar over the weekend but had to be placed back on the vent earlier this week   - will treat his pain- hopefully this will improve his HR and then start back on weaning trails   4/7- aspirated yesterday - febrile this morning. Will start Clindamycin today -   4/8- clinically looks better- will resume PSV today as tolerated   4/9- increased respiratory rate and tachycardic on exam.  Patient had just been turned.  This is all likely secondary to pain.  Will treat his pain and then increase his CPAP to 4 hours t.i.d. as tolerated.  4/13- to OR today  Then  -continue PSV as tolerated   4/16- Continue to increase trials as tolerated    Tachycardia  Treated anemia   - concern this may be releated to pain and pain medication withdraw - his fentanyl patch was discontinued over the weekend which he has been on for weeks.  -plan - will start PO pain medication q8hrs then decrease to q12 hours after 7 days.   4/8 - improving   4/9-heart rate up on exam.  Secondary to pain.  Will treat p.r.n. pain medication.  4/14- had some SVT yesterday that improved with treating his pain and lopressor    Pressure ulcer of sacral region, unstageable  Culture and sensitivity reviewed -now on vancomycin and zosyn   - wound care NP to evaluate to see if he may need debridement   4/13- scheduled for debridement today     Fever  2/28 - resp culture with pseudomonas and klebsiella  treat with zosyn for 7 days   Blood culture from 2/25 also growing Pseudomonas. Sensitive to zosyn. Will need to continue zosyn for at least 10 days total  - low grade fever since 3/28, CXR- unchanged and repeat CBC- WBC 20.57  - removed malpositioned LUE PICC.  - blood cultures- NGTD after 72  Hours   -4/6-  Temp 101 at noon yesterday- wound cultures with heavy growth gram neg bacilli - will go ahead and add Rocephin and continue to follow cultures    4/7- febrile this morning -  Rhinitis   • Memory loss   • Lung cancer (CMS/HCC)   • S/P lobectomy of lung - RIGHT lower lobe with lymph node dissection   • Primary osteoarthritis of left shoulder   • Essential hypertension with goal blood pressure less than 140/90   • Facet arthropathy, cervical   • Bone metastases (CMS/HCC)   • Adenocarcinoma of right lung (CMS/HCC)   • Encounter for chemotherapy management   • Epididymitis, right   • Primary osteoarthritis of left shoulder   • Drug-induced neutropenia (CMS/HCC)   • Drug-induced hemolytic anemia (CMS/HCC)   • UTI (urinary tract infection)         Prior Living Situation:  Type of Home: House (12/14/17 1447)  Lives With: Spouse (12/14/17 1447)    Vitals:  Vital Signs: sp02 RA 98-99%,  after activity  (12/15/17 1458)    Bed Mobility:  Bed Mobility  Supine to Sit: Modified Independent (12/15/17 1458)  Sit to Supine: Minimal Assist (Min) (12/15/17 1458)  Bed Mobility Comments: flat bed, no railing, pt comes to long sitting and uses BUE to manage RLE.  Assist of therapist to support RLE for sit to supine with pt also utilizing BUE to manage RLE  (12/15/17 1458)    Transfers:  Transfers  Sit to Stand: Supervision (Supv) (12/15/17 1458)  Stand to Sit: Supervision (Supv) (12/15/17 1458)  Assistive Device/: 2-wheeled walker (12/15/17 1458)  Transfer Comments 1: cueing for sequence hand placement each transfer to stand bed and armchair.  No cues needed for hand placement to control stand to sit.  (12/15/17 1458)    Gait:  Gait  Gait Assistance: Supervision (Supv) (12/15/17 1458)  Assistive Device/: 2-wheeled walker (12/15/17 1458)  Ambulation Distance (Feet): 150 Feet (12/15/17 1458)  Gait Comments 1: moderate BUE support on ww, partial step through pattern tolerance  (12/15/17 1458)  Gait Comments 2: pt able to self pace distance tolerance with turning around back to room  (12/15/17 1458)       Stairs:  Stairs Mobility  Number of Stairs: 1 (12/15/17 1458)  Stair  Management Assistance: Touching/Steadying Assistance (12/15/17 1648)  Stair Management Technique: One rail L;Forwards;With walker;Step to pattern (12/15/17 8618)  Stairs Mobility Comments: Set up for stoop into home, pt reports has left rail available, utilizing BUE support on ww for descent, cues for sequence.  Pt able to verbalize appropriate sequence and reports wife always reminds him.  (12/15/17 2618)       Exercises:       Education:   On this date, the patient was educated on transfer safety, gait training with ww, stair training.    The response to education was: Verbalizes understanding and Needs reinforcement.         Equipment:  PT/OT Mobility Equipment for Discharge: Owns WW and SPC. No additional needs anticipated (12/14/17 1449)  PT/OT ADL Equipment for Discharge: none (12/14/17 7793)    Therapy Goals  Goals  Short Term Goals to Be Reviewed On: 11/28/17 (12/14/17 1449)  Short Term Goals = Discharge Goals: Yes (12/14/17 1449)  Goal Agreement: Family/significant other/caregiver agrees (12/14/17 1449)  Transfer Discharge Goal: modified independent  (12/14/17 1449)  Ambulation Discharge Goal: modified independent ww 300 feet  (12/14/17 1449)  Stairs Discharge Goal: Supv with WW and stoop step to enter (12/14/17 1449)    Interventions and Treatment Time:  Treatment/Interventions: Functional transfer training;Strengthening;Bed mobility;Gait training;Stairs retraining;Safety Education;Neuromuscular re-education (12/14/17 1449)  PT Time Spent: 40 minutes (12/15/17 1458)       added clinda given his aspiration event   4/8 - afebrile- would cultures reviewed - sensitive to Rocephin   4/9-  wound culture sensitivity has returned; escalating to Zosyn specially given his fever overnight.  4/10--- febrile - TMAx 103.1 - will add MRSA coverage - pan culture   4/11- Tmax 99.9 after adding MRSA coverage   4/12- no fever in last 24 hours  4/14- remains afebrile    ESRD (end stage renal disease)  Started on HD 12/30   Continue with HD per nephrology      Type 2 diabetes mellitus without complication, without long-term current use of insulin  Continue with current care  accuchecks are ok    Acute blood loss anemia  No active bleeding  Hgb 6.0 on 4/11  Patient transfused 1 unit with dialysis yesterday  4/13 -H/H 7.5/22.7  4/15- hgb is 6.2 today. We are going to give another unit of PRBC's today with dialysis  4/16 post transfusion cbc is pending    Necrotizing fasciitis  S/p multiple surgeries   Last debridement for wounds was 4/13    Hypertension  BP currently ok                   Cecil Abernathy, DO  Pulmonology  [facility]

## 2022-04-17 NOTE — SUBJECTIVE & OBJECTIVE
Interval History: No acute events overnight. The patient is currently resting comfortably. He is currently afebrile and vital signs are stable.    Objective:     Vital Signs (Most Recent):  Temp: 98.5 °F (36.9 °C) (04/17/22 0705)  Pulse: 92 (04/17/22 0810)  Resp: 14 (04/17/22 0810)  BP: 124/61 (04/17/22 0701)  SpO2: 100 % (04/17/22 0810)   Vital Signs (24h Range):  Temp:  [97.2 °F (36.2 °C)-99.9 °F (37.7 °C)] 98.5 °F (36.9 °C)  Pulse:  [] 92  Resp:  [13-35] 14  SpO2:  [100 %] 100 %  BP: ()/(54-71) 124/61     Weight: 81 kg (178 lb 9.2 oz)  Body mass index is 24.91 kg/m².      Intake/Output Summary (Last 24 hours) at 4/17/2022 0840  Last data filed at 4/17/2022 0600  Gross per 24 hour   Intake 2020 ml   Output 600 ml   Net 1420 ml         Physical Exam  Vitals reviewed.   Constitutional:       General: He is not in acute distress.     Appearance: He is ill-appearing.      Comments: Chronically ill appearing   HENT:      Right Ear: External ear normal.      Left Ear: External ear normal.      Mouth/Throat:      Mouth: Mucous membranes are moist.   Eyes:      Pupils: Pupils are equal, round, and reactive to light.   Cardiovascular:      Rate and Rhythm: Normal rate and regular rhythm.   Pulmonary:      Breath sounds: Normal breath sounds. No wheezing, rhonchi or rales.   Abdominal:      Palpations: Abdomen is soft.   Musculoskeletal:         General: Normal range of motion.      Cervical back: Normal range of motion and neck supple.   Skin:     General: Skin is warm and dry.      Capillary Refill: Capillary refill takes less than 2 seconds.   Neurological:      Mental Status: He is alert. Mental status is at baseline.       Vents:  Vent Mode: A/C (Placed on Cpap) (04/17/22 0810)  Ventilator Initiated: Yes (04/10/22 0343)  Set Rate: 14 BPM (04/17/22 0340)  Vt Set: 480 mL (04/17/22 0340)  Pressure Support: 10 cmH20 (04/16/22 2250)  PEEP/CPAP: 5 cmH20 (04/17/22 0340)  Oxygen Concentration (%): 35 (04/17/22  0715)  Peak Airway Pressure: 16 cmH2O (04/17/22 0340)  Plateau Pressure: 20 cmH20 (03/07/22 0500)  Total Ve: 10.8 mL (04/17/22 0340)  F/VT Ratio<105 (RSBI): (!) 55.56 (04/17/22 0340)    Lines/Drains/Airways       Central Venous Catheter Line  Duration                  Hemodialysis Catheter 12/30/21 0900 right internal jugular 107 days              Drain  Duration                  Colostomy 12/18/21 1030 Descending/sigmoid  days         Gastrostomy/Enterostomy 03/09/22 1436 Percutaneous endoscopic gastrostomy (PEG) midline feeding 38 days              Airway  Duration                  Surgical Airway 01/04/22 Shiley 103 days              Peripheral Intravenous Line  Duration                  Peripheral IV - Single Lumen 04/04/22 1430 22 G Posterior;Right Hand 12 days         Peripheral IV - Single Lumen 04/11/22 1623 18 G Anterior;Proximal;Right Forearm 5 days                    Significant Labs:    CBC/Anemia Profile:  Recent Labs   Lab 04/16/22  1031   WBC 24.44*   HGB 6.6*   HCT 19.2*   *   MCV 89.7   RDW 14.7*          Chemistries:  No results for input(s): NA, K, CL, CO2, BUN, CREATININE, CALCIUM, ALBUMIN, PROT, BILITOT, ALKPHOS, ALT, AST, GLUCOSE, MG, PHOS in the last 48 hours.    All pertinent labs within the past 24 hours have been reviewed.    Significant Imaging:  I have reviewed all pertinent imaging results/findings within the past 24 hours.

## 2022-04-17 NOTE — ASSESSMENT & PLAN NOTE
Dialysis MWF  Continue with scheduled hemodialysis  4/12/2022  Dialysis sessions are going well.  4/13/2022 Continue with scheduled hemodialysis for this patient.  4/14/2022  Continue with dialysis  4/16/2022 Chronic condition which is stable.  4/17/2022  Dialysis as scheduled.

## 2022-04-17 NOTE — ASSESSMENT & PLAN NOTE
Was doing trach collar over the weekend but had to be placed back on the vent earlier this week   - will treat his pain- hopefully this will improve his HR and then start back on weaning trails   4/7- aspirated yesterday - febrile this morning. Will start Clindamycin today -   4/8- clinically looks better- will resume PSV today as tolerated   4/9- increased respiratory rate and tachycardic on exam.  Patient had just been turned.  This is all likely secondary to pain.  Will treat his pain and then increase his CPAP to 4 hours t.i.d. as tolerated.  4/13- to OR today  Then  -continue PSV as tolerated   4/16- Continue to increase trials as tolerated  4/17 plan for 5 hours tid today

## 2022-04-17 NOTE — ASSESSMENT & PLAN NOTE
S/p multiple surgeries   Last debridement for wounds was 4/13  Wound culture with light growth gram negative rods

## 2022-04-17 NOTE — ASSESSMENT & PLAN NOTE
No active bleeding  Hgb 6.0 on 4/11  Patient transfused 1 unit with dialysis yesterday  4/13 -H/H 7.5/22.7  4/15- hgb is 6.2 today. We are going to give another unit of PRBC's today with dialysis  4/16 post transfusion cbc is pending  4/17- hgb is 6.6. Will plan for transfusion with 1 unit of prbc's with next HD

## 2022-04-17 NOTE — PROGRESS NOTES
[facility]  Pulmonology  Progress Note    Patient Name: Og Garcia  MRN: [id number]  Admission Date: 12/23/2021  Hospital Length of Stay: 115 days  Code Status: Prior  Attending Provider: Cecil Abernathy DO  Primary Care Provider: Hector Arndt DNP, FNP-C   Principal Problem: Denice gangrene    Subjective:     Interval History: No acute events overnight. The patient is currently resting comfortably. He is currently afebrile and vital signs are stable.    Objective:     Vital Signs (Most Recent):  Temp: 98.5 °F (36.9 °C) (04/17/22 0705)  Pulse: 92 (04/17/22 0810)  Resp: 14 (04/17/22 0810)  BP: 124/61 (04/17/22 0701)  SpO2: 100 % (04/17/22 0810)   Vital Signs (24h Range):  Temp:  [97.2 °F (36.2 °C)-99.9 °F (37.7 °C)] 98.5 °F (36.9 °C)  Pulse:  [] 92  Resp:  [13-35] 14  SpO2:  [100 %] 100 %  BP: ()/(54-71) 124/61     Weight: 81 kg (178 lb 9.2 oz)  Body mass index is 24.91 kg/m².      Intake/Output Summary (Last 24 hours) at 4/17/2022 0840  Last data filed at 4/17/2022 0600  Gross per 24 hour   Intake 2020 ml   Output 600 ml   Net 1420 ml         Physical Exam  Vitals reviewed.   Constitutional:       General: He is not in acute distress.     Appearance: He is ill-appearing.      Comments: Chronically ill appearing   HENT:      Right Ear: External ear normal.      Left Ear: External ear normal.      Mouth/Throat:      Mouth: Mucous membranes are moist.   Eyes:      Pupils: Pupils are equal, round, and reactive to light.   Cardiovascular:      Rate and Rhythm: Normal rate and regular rhythm.   Pulmonary:      Breath sounds: Normal breath sounds. No wheezing, rhonchi or rales.   Abdominal:      Palpations: Abdomen is soft.   Musculoskeletal:         General: Normal range of motion.      Cervical back: Normal range of motion and neck supple.   Skin:     General: Skin is warm and dry.      Capillary Refill: Capillary refill takes less than 2 seconds.   Neurological:       Mental Status: He is alert. Mental status is at baseline.       Vents:  Vent Mode: A/C (Placed on Cpap) (04/17/22 0810)  Ventilator Initiated: Yes (04/10/22 0343)  Set Rate: 14 BPM (04/17/22 0340)  Vt Set: 480 mL (04/17/22 0340)  Pressure Support: 10 cmH20 (04/16/22 2250)  PEEP/CPAP: 5 cmH20 (04/17/22 0340)  Oxygen Concentration (%): 35 (04/17/22 0715)  Peak Airway Pressure: 16 cmH2O (04/17/22 0340)  Plateau Pressure: 20 cmH20 (03/07/22 0500)  Total Ve: 10.8 mL (04/17/22 0340)  F/VT Ratio<105 (RSBI): (!) 55.56 (04/17/22 0340)    Lines/Drains/Airways       Central Venous Catheter Line  Duration                  Hemodialysis Catheter 12/30/21 0900 right internal jugular 107 days              Drain  Duration                  Colostomy 12/18/21 1030 Descending/sigmoid  days         Gastrostomy/Enterostomy 03/09/22 1436 Percutaneous endoscopic gastrostomy (PEG) midline feeding 38 days              Airway  Duration                  Surgical Airway 01/04/22 Shiley 103 days              Peripheral Intravenous Line  Duration                  Peripheral IV - Single Lumen 04/04/22 1430 22 G Posterior;Right Hand 12 days         Peripheral IV - Single Lumen 04/11/22 1623 18 G Anterior;Proximal;Right Forearm 5 days                    Significant Labs:    CBC/Anemia Profile:  Recent Labs   Lab 04/16/22  1031   WBC 24.44*   HGB 6.6*   HCT 19.2*   *   MCV 89.7   RDW 14.7*          Chemistries:  No results for input(s): NA, K, CL, CO2, BUN, CREATININE, CALCIUM, ALBUMIN, PROT, BILITOT, ALKPHOS, ALT, AST, GLUCOSE, MG, PHOS in the last 48 hours.    All pertinent labs within the past 24 hours have been reviewed.    Significant Imaging:  I have reviewed all pertinent imaging results/findings within the past 24 hours.    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 Will start Clindamycin today -   4/8- clinically looks better- will resume PSV today as tolerated   4/9- increased respiratory rate and tachycardic on exam.  Patient had just been turned.  This is all likely secondary to pain.  Will treat his pain and then increase his CPAP to 4 hours t.i.d. as tolerated.  4/13- to OR today  Then  -continue PSV as tolerated   4/16- Continue to increase trials as tolerated  4/17 plan for 5 hours tid today    Tachycardia  Treated anemia   - concern this may be releated to pain and pain medication withdraw - his fentanyl patch was discontinued over the weekend which he has been on for weeks.  -plan - will start PO pain medication q8hrs then decrease to q12 hours after 7 days.   4/8 - improving   4/9-heart rate up on exam.  Secondary to pain.  Will treat p.r.n. pain medication.  4/14- had some SVT yesterday that improved with treating his pain and lopressor    Pressure ulcer of sacral region, unstageable  Culture and sensitivity reviewed -now on vancomycin and zosyn   - wound care NP to evaluate to see if he may need debridement   4/13- scheduled for debridement today     ESRD (end stage renal disease)  Started on HD 12/30   Continue with HD per nephrology      Type 2 diabetes mellitus without complication, without long-term current use of insulin  Continue with current care  accuchecks are ok    Acute blood loss anemia  No active bleeding  Hgb 6.0 on 4/11  Patient transfused 1 unit with dialysis yesterday  4/13 -H/H 7.5/22.7  4/15- hgb is 6.2 today. We are going to give another unit of PRBC's today with dialysis  4/16 post transfusion cbc is pending  4/17-  hgb is 6.6. Will plan for transfusion with 1 unit of prbc's with next HD    Necrotizing fasciitis  S/p multiple surgeries   Last debridement for wounds was 4/13  Wound culture with light growth gram negative rods    Hypertension  BP currently ok                   Cecil Abernathy, DO  Pulmonology  [facility]

## 2022-04-17 NOTE — PLAN OF CARE
Problem: Adult Inpatient Plan of Care  Goal: Plan of Care Review  Outcome: Ongoing, Progressing  Goal: Patient-Specific Goal (Individualized)  Outcome: Ongoing, Progressing  Goal: Optimal Comfort and Wellbeing  Outcome: Ongoing, Progressing     Problem: Adjustment to Illness (Sepsis/Septic Shock)  Goal: Optimal Coping  Outcome: Ongoing, Progressing     Problem: Nutrition Impaired (Sepsis/Septic Shock)  Goal: Optimal Nutrition Intake  Outcome: Ongoing, Progressing     Problem: Adult Inpatient Plan of Care  Goal: Absence of Hospital-Acquired Illness or Injury  Outcome: Ongoing, Not Progressing  Goal: Readiness for Transition of Care  Outcome: Ongoing, Not Progressing     Problem: Glycemic Control Impaired (Sepsis/Septic Shock)  Goal: Blood Glucose Level Within Desired Range  Outcome: Ongoing, Not Progressing

## 2022-04-17 NOTE — SUBJECTIVE & OBJECTIVE
Interval History: The patient  is resting.  No acute changes.    Review of patient's allergies indicates:  No Known Allergies  Current Facility-Administered Medications   Medication Frequency    0.9%  NaCl infusion (for blood administration) Q24H PRN    0.9%  NaCl infusion (for blood administration) Q24H PRN    0.9%  NaCl infusion PRN    acetaminophen tablet 500 mg Q6H PRN    albuterol nebulizer solution 2.5 mg Q4H PRN    albuterol-ipratropium 2.5 mg-0.5 mg/3 mL nebulizer solution 3 mL Q6H    bisacodyL EC tablet 10 mg Daily PRN    calcium gluconate 1 g in dextrose 5 % in water (D5W) 5 % 100 mL IVPB (MB+) Once    dextrose 50% injection 12.5 g PRN    diltiaZEM tablet 90 mg Q6H    glucagon (human recombinant) injection 1 mg PRN    guaiFENesin 100 mg/5 ml syrup 200 mg Q6H    heparin (porcine) injection 4,000 Units PRN    heparin (porcine) injection 5,000 Units Q8H    HYDROcodone-acetaminophen 5-325 mg per tablet 1 tablet Q12H    insulin aspart U-100 injection 1-10 Units Q6H    insulin detemir U-100 injection 25 Units QHS    metoprolol injection 5 mg Q4H PRN    morphine injection 4 mg Q4H PRN    NORepinephrine 4 mg in dextrose 5 % 250 mL infusion Continuous    ondansetron injection 8 mg Q6H PRN    pantoprazole suspension 40 mg Daily    predniSONE tablet 5 mg Daily    sevelamer carbonate pwpk 0.8 g TID WM    silver sulfADIAZINE 1% cream Daily PRN    simethicone chewable tablet 80 mg TID PRN    vancomycin - pharmacy to dose pharmacy to manage frequency       Objective:     Vital Signs (Most Recent):  Temp: 98.5 °F (36.9 °C) (04/17/22 0705)  Pulse: 105 (04/17/22 1224)  Resp: (!) 26 (04/17/22 1331)  BP: 127/62 (04/17/22 1157)  SpO2: 100 % (04/17/22 1224)  O2 Device (Oxygen Therapy): ventilator (04/17/22 1310)   Vital Signs (24h Range):  Temp:  [97.2 °F (36.2 °C)-99.9 °F (37.7 °C)] 98.5 °F (36.9 °C)  Pulse:  [] 105  Resp:  [13-35] 26  SpO2:  [100 %] 100 %  BP: ()/(54-69) 127/62     Weight: 81 kg (178 lb 9.2 oz)  (04/17/22 0600)  Body mass index is 24.91 kg/m².  Body surface area is 2.01 meters squared.    I/O last 3 completed shifts:  In: 3470 [P.O.:360; Other:400; NG/GT:2610; IV Piggyback:100]  Out: 600 [Stool:600]    Physical Exam  Vitals reviewed.   HENT:      Head: Atraumatic.      Mouth/Throat:      Mouth: Mucous membranes are moist.   Cardiovascular:      Rate and Rhythm: Regular rhythm.   Pulmonary:      Effort: Pulmonary effort is normal.   Abdominal:      General: Abdomen is flat.   Neurological:      Mental Status: He is alert.   Psychiatric:         Mood and Affect: Mood normal.       Significant Labs:  BMP: No results for input(s): GLU, NA, K, CL, CO2, BUN, CREATININE, CALCIUM, MG in the last 168 hours.  CBC:   Recent Labs   Lab 04/16/22  1031   WBC 24.44*   RBC 2.14*   HGB 6.6*   HCT 19.2*   *   MCV 89.7   MCH 30.8   MCHC 34.4        Significant Imaging:  Labs: Reviewed

## 2022-04-17 NOTE — PROGRESS NOTES
Placed patient back on the vent via ACV with previous settings for rest.  Patient did 4 hours of CPAP 5, PSV 10, FIO2 35% with no complications.

## 2022-04-18 NOTE — PROGRESS NOTES
[facility]  Pulmonology  Progress Note    Patient Name: Og Garcia  MRN: [id number]  Admission Date: 12/23/2021  Hospital Length of Stay: 116 days  Code Status: Prior  Attending Provider: Cecil Abernathy DO  Primary Care Provider: Hector Arndt DNP, FNP-C   Principal Problem: Denice gangrene    Subjective:     Interval History: No acute events overnight. The patient is currently resting comfortably. He is currently afebrile and vital signs are stable.    Objective:     Vital Signs (Most Recent):  Temp: 98.2 °F (36.8 °C) (04/18/22 1515)  Pulse: 101 (04/18/22 1545)  Resp: (!) 27 (04/18/22 1545)  BP: 120/67 (04/18/22 1545)  SpO2: 100 % (04/18/22 1545)   Vital Signs (24h Range):  Temp:  [98 °F (36.7 °C)-99.1 °F (37.3 °C)] 98.2 °F (36.8 °C)  Pulse:  [] 101  Resp:  [8-33] 27  SpO2:  [96 %-100 %] 100 %  BP: (108-147)/(55-74) 120/67     Weight: 79.7 kg (175 lb 11.3 oz)  Body mass index is 24.51 kg/m².      Intake/Output Summary (Last 24 hours) at 4/18/2022 1610  Last data filed at 4/18/2022 1515  Gross per 24 hour   Intake 2450 ml   Output 350 ml   Net 2100 ml         Physical Exam  Vitals reviewed.   Constitutional:       General: He is not in acute distress.     Appearance: He is ill-appearing.      Comments: Chronically ill appearing   HENT:      Right Ear: External ear normal.      Left Ear: External ear normal.      Mouth/Throat:      Mouth: Mucous membranes are moist.   Eyes:      Pupils: Pupils are equal, round, and reactive to light.   Cardiovascular:      Rate and Rhythm: Normal rate and regular rhythm.   Pulmonary:      Breath sounds: Normal breath sounds. No wheezing, rhonchi or rales.   Abdominal:      Palpations: Abdomen is soft.   Musculoskeletal:         General: Normal range of motion.      Cervical back: Normal range of motion and neck supple.   Skin:     General: Skin is warm and dry.      Capillary Refill: Capillary refill takes less than 2 seconds.    Neurological:      Mental Status: He is alert. Mental status is at baseline.       Vents:  Vent Mode: A/C (pt placed back on ac, tolerated cpap well x 5hr) (04/18/22 1315)  Ventilator Initiated: Yes (04/10/22 0343)  Set Rate: 14 BPM (04/18/22 0505)  Vt Set: 480 mL (04/18/22 0505)  Pressure Support: 10 cmH20 (04/18/22 1235)  PEEP/CPAP: 5 cmH20 (04/18/22 1235)  Oxygen Concentration (%): 35 (04/18/22 0736)  Peak Airway Pressure: 16 cmH2O (04/18/22 1235)  Plateau Pressure: 20 cmH20 (03/07/22 0500)  Total Ve: 8 mL (04/18/22 1235)  F/VT Ratio<105 (RSBI): (!) 73.3 (04/18/22 0505)    Lines/Drains/Airways       Central Venous Catheter Line  Duration                  Hemodialysis Catheter 12/30/21 0900 right internal jugular 109 days              Drain  Duration                  Colostomy 12/18/21 1030 Descending/sigmoid  days         Gastrostomy/Enterostomy 03/09/22 1436 Percutaneous endoscopic gastrostomy (PEG) midline feeding 40 days              Airway  Duration                  Surgical Airway 01/04/22 Shiley 104 days              Peripheral Intravenous Line  Duration                  Peripheral IV - Single Lumen 04/04/22 1430 22 G Posterior;Right Hand 14 days         Peripheral IV - Single Lumen 04/11/22 1623 18 G Anterior;Proximal;Right Forearm 6 days                    Significant Labs:    CBC/Anemia Profile:  Recent Labs   Lab 04/18/22  1013   WBC 21.49*   HGB 7.0*   HCT 20.7*   *   MCV 88.5   RDW 15.5*          Chemistries:  No results for input(s): NA, K, CL, CO2, BUN, CREATININE, CALCIUM, ALBUMIN, PROT, BILITOT, ALKPHOS, ALT, AST, GLUCOSE, MG, PHOS in the last 48 hours.    All pertinent labs within the past 24 hours have been reviewed.    Significant Imaging:  I have reviewed all pertinent imaging results/findings within the past 24 hours.    Assessment/Plan:     * Denice gangrene  - s/p multiple debridements  - pathology with fungal species consistent with mucormycosis initiated on  amphotericin  - 2/21 back to OR for skin grafting  - 3/9 had peg tube placed   Currently stable with no acute issues  Wound culture with proteus and rare growth pseudomonas. I am going to treat with cefepime (Pseudomonas likely colonized)      Chronic respiratory failure  Was doing trach collar over the weekend but had to be placed back on the vent earlier this week   - will treat his pain- hopefully this will improve his HR and then start back on weaning trails   4/7- aspirated yesterday - febrile this morning. Will start Clindamycin today -   4/8- clinically looks better- will resume PSV today as tolerated   4/9- increased respiratory rate and tachycardic on exam.  Patient had just been turned.  This is all likely secondary to pain.  Will treat his pain and then increase his CPAP to 4 hours t.i.d. as tolerated.  4/13- to OR today  Then  -continue PSV as tolerated   4/16- Continue to increase trials as tolerated  4/18 plan for 6 hours tid today    Tachycardia  Treated anemia   - concern this may be releated to pain and pain medication withdraw - his fentanyl patch was discontinued over the weekend which he has been on for weeks.  -plan - will start PO pain medication q8hrs then decrease to q12 hours after 7 days.   4/8 - improving   4/9-heart rate up on exam.  Secondary to pain.  Will treat p.r.n. pain medication.  4/14- had some SVT yesterday that improved with treating his pain and lopressor    Pressure ulcer of sacral region, unstageable  Culture and sensitivity reviewed -now on vancomycin and zosyn   - wound care NP to evaluate to see if he may need debridement   4/13- scheduled for debridement today     Fever  2/28 - resp culture with pseudomonas and klebsiella  treat with zosyn for 7 days   Blood culture from 2/25 also growing Pseudomonas. Sensitive to zosyn. Will need to continue zosyn for at least 10 days total  - low grade fever since 3/28, CXR- unchanged and repeat CBC- WBC 20.57  - removed malpositioned  RESHMAE PICC.  - blood cultures- NGTD after 72  Hours   -4/6-  Temp 101 at noon yesterday- wound cultures with heavy growth gram neg bacilli - will go ahead and add Rocephin and continue to follow cultures    4/7- febrile this morning - added clinda given his aspiration event   4/8 - afebrile- would cultures reviewed - sensitive to Rocephin   4/9-  wound culture sensitivity has returned; escalating to Zosyn specially given his fever overnight.  4/10--- febrile - TMAx 103.1 - will add MRSA coverage - pan culture   4/11- Tmax 99.9 after adding MRSA coverage   4/12- no fever in last 24 hours  4/14- remains afebrile    ESRD (end stage renal disease)  Started on HD 12/30   Continue with HD per nephrology      Type 2 diabetes mellitus without complication, without long-term current use of insulin  Continue with current care  accuchecks are ok    Acute blood loss anemia  No active bleeding  Hgb 6.0 on 4/11  Patient transfused 1 unit with dialysis yesterday  4/13 -H/H 7.5/22.7  4/15- hgb is 6.2 today. We are going to give another unit of PRBC's today with dialysis  4/16 post transfusion cbc is pending  4/17- hgb is 6.6. Will plan for transfusion with 1 unit of prbc's with next HD  7.0 today    Hypertension  BP currently ok                   Cecil Abernathy, DO  Pulmonology  [facility]

## 2022-04-18 NOTE — PLAN OF CARE
Problem: Fluid and Electrolyte Imbalance (Acute Kidney Injury/Impairment)  Goal: Fluid and Electrolyte Balance  Outcome: Ongoing, Progressing     Problem: Oral Intake Inadequate (Acute Kidney Injury/Impairment)  Goal: Optimal Nutrition Intake  Outcome: Ongoing, Progressing     Problem: Renal Function Impairment (Acute Kidney Injury/Impairment)  Goal: Effective Renal Function  Outcome: Ongoing, Progressing     Problem: Infection  Goal: Absence of Infection Signs and Symptoms  Outcome: Ongoing, Progressing      no

## 2022-04-18 NOTE — ASSESSMENT & PLAN NOTE
Was doing trach collar over the weekend but had to be placed back on the vent earlier this week   - will treat his pain- hopefully this will improve his HR and then start back on weaning trails   4/7- aspirated yesterday - febrile this morning. Will start Clindamycin today -   4/8- clinically looks better- will resume PSV today as tolerated   4/9- increased respiratory rate and tachycardic on exam.  Patient had just been turned.  This is all likely secondary to pain.  Will treat his pain and then increase his CPAP to 4 hours t.i.d. as tolerated.  4/13- to OR today  Then  -continue PSV as tolerated   4/16- Continue to increase trials as tolerated  4/18 plan for 6 hours tid today

## 2022-04-18 NOTE — PROGRESS NOTES
Placed patient back on the vent via ACV with previous settings for rest.  Patient did 5 hours of CPAP 5, PSV 10, FIO2 35% with no issues.

## 2022-04-18 NOTE — DIALYSIS ROUNDING
"          Nephrology Department            NAME: Og Garcia   YOB: 1955  MRN: [id number]  NOTE DATE: 04/18/2022       Seen on dialysis.  He is tolerating the procedure.  He did get a unit of blood.    Patient complains:  None.      REVIEW OF SYSTEMS:  he reports no shortness of breath, nausea or vomiting. No acute changes.    VITALS:  height is 5' 11" (1.803 m) and weight is 79.7 kg (175 lb 11.3 oz). His temperature is 98.2 °F (36.8 °C). His blood pressure is 115/64 and his pulse is 105. His respiration is 30 (abnormal) and oxygen saturation is 100%.  Hemodialysis documentation flowsheet reviewed with dialysis nurse, including weight and vitals.    Resting comfortably, NAD.  Respiration unlabored.   Heart regular, no rub.  Abdomen soft, nontender, positive bowl sounds  No edema.    No results for input(s): NA, K, CL, CO2, BUN, CREATININE, GLUCOSE, CALCIUM, PHOS in the last 168 hours.    Invalid input(s): EGFR, LABALBU  Recent Labs   Lab 04/15/22  0551 04/16/22  1031 04/18/22  1013   WBC 25.38* 24.44* 21.49*   HGB 6.2* 6.6* 7.0*   HCT 18.9* 19.2* 20.7*   * 402* 433*       ASSESSMENT/PLAN:  Continue with scheduled hemodialysis for this patient.    Edwin Pryor Jr, MD  "

## 2022-04-18 NOTE — ASSESSMENT & PLAN NOTE
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 I am going to treat with cefepime (Pseudomonas likely colonized)

## 2022-04-18 NOTE — ASSESSMENT & PLAN NOTE
No active bleeding  Hgb 6.0 on 4/11  Patient transfused 1 unit with dialysis yesterday  4/13 -H/H 7.5/22.7  4/15- hgb is 6.2 today. We are going to give another unit of PRBC's today with dialysis  4/16 post transfusion cbc is pending  4/17- hgb is 6.6. Will plan for transfusion with 1 unit of prbc's with next HD  7.0 today

## 2022-04-18 NOTE — PROGRESS NOTES
Pharmacokinetic Assessment Follow Up: IV Vancomycin    Vancomycin serum concentration assessment(s):    The random level was drawn correctly and can be used to guide therapy at this time. The measurement is within the desired definitive target range of < 20.    Vancomycin Regimen Plan:    Vanc 1500 mg IV x 1 tonight.  A random vanc trough has been ordered for 4/22 at 0600.    Drug levels (last 3 results):  Recent Labs   Lab Result Units 04/18/22  1013   Vancomycin, Random µg/mL 15.8       Pharmacy will continue to follow and monitor vancomycin.    Please contact pharmacy at extension [phone] for questions regarding this assessment.    Patient brief summary:  Og Garcia is a 66 y.o. male initiated on antimicrobial therapy with IV Vancomycin for treatment of skin & soft tissue infection    Drug Allergies:   Review of patient's allergies indicates:  No Known Allergies    Actual Body Weight:   79.7 kg    Renal Function:   CrCl cannot be calculated (Patient's most recent lab result is older than the maximum 7 days allowed.).,     Dialysis Method (if applicable):  intermittent HD    CBC (last 72 hours):  Recent Labs   Lab Result Units 04/16/22  1031   WBC K/uL 24.44*   Hemoglobin g/dL 6.6*   Hematocrit % 19.2*   Platelet Count K/uL 402*   Lymphocytes % % 10.4*   Lymphocytes, Man % % 8*   Monocytes % % 4.7   Monocytes, Man % % 6   Eosinophils % % 1.3   Eosinophils, Man % % 1   Basophils % % 0.2   Basophils, Man % % 1       Metabolic Panel (last 72 hours):  No results for input(s): SODIUM, POTASSIUM, CHLORIDE, CO2, GLUCOSE, BUN BLD, CREATININE, ALBUMIN, BILIRUBIN TOTAL, ALK PHOS, AST, ALT, MAGNESIUM, PHOSPHORUS in the last 72 hours.    Vancomycin Administrations:  vancomycin given in the last 96 hours                     vancomycin (VANCOCIN) 1,500 mg in dextrose 5 % 250 mL IVPB (mg) 1,500 mg New Bag 04/15/22 6863                    Microbiologic Results:  Microbiology Results (last 7 days)       Procedure Component  Value Units Date/Time    Blood culture (site 2) [[id number]] Collected: 04/10/22 1302    Order Status: Completed Specimen: Blood Updated: 04/16/22 0607     Culture, Blood No Growth at 5 days    Blood culture (site 1) [[id number]] Collected: 04/10/22 1302    Order Status: Completed Specimen: Blood Updated: 04/16/22 0607     Culture, Blood No Growth at 5 days    Culture, Lower Respiratory [[id number]]  (Abnormal)  (Susceptibility) Collected: 04/10/22 1309    Order Status: Completed Specimen: Respiratory from Tracheal Aspirate Updated: 04/13/22 1047     Culture, Lower Respiratory Moderate Growth Pseudomonas aeruginosa     Comment: CRPA (Carbapenem-resistant Pseudomonas areuginosa)  Corrected result: Previously reported as Gram-negative Bacilli on 4/11/2022 at 1151 CDT.         Moderate Growth Klebsiella pneumoniae     Comment: ESBL (Extended spectrum beta-lactamase)  Corrected result: Previously reported as Gram-negative Bacilli on 4/12/2022 at 1438 CDT.        Gram Stain Result Many WBC seen      Too numerous to count Gram negative bacilli

## 2022-04-18 NOTE — SUBJECTIVE & OBJECTIVE
Interval History: No acute events overnight. The patient is currently resting comfortably. He is currently afebrile and vital signs are stable.    Objective:     Vital Signs (Most Recent):  Temp: 98.2 °F (36.8 °C) (04/18/22 1515)  Pulse: 101 (04/18/22 1545)  Resp: (!) 27 (04/18/22 1545)  BP: 120/67 (04/18/22 1545)  SpO2: 100 % (04/18/22 1545)   Vital Signs (24h Range):  Temp:  [98 °F (36.7 °C)-99.1 °F (37.3 °C)] 98.2 °F (36.8 °C)  Pulse:  [] 101  Resp:  [8-33] 27  SpO2:  [96 %-100 %] 100 %  BP: (108-147)/(55-74) 120/67     Weight: 79.7 kg (175 lb 11.3 oz)  Body mass index is 24.51 kg/m².      Intake/Output Summary (Last 24 hours) at 4/18/2022 1610  Last data filed at 4/18/2022 1515  Gross per 24 hour   Intake 2450 ml   Output 350 ml   Net 2100 ml         Physical Exam  Vitals reviewed.   Constitutional:       General: He is not in acute distress.     Appearance: He is ill-appearing.      Comments: Chronically ill appearing   HENT:      Right Ear: External ear normal.      Left Ear: External ear normal.      Mouth/Throat:      Mouth: Mucous membranes are moist.   Eyes:      Pupils: Pupils are equal, round, and reactive to light.   Cardiovascular:      Rate and Rhythm: Normal rate and regular rhythm.   Pulmonary:      Breath sounds: Normal breath sounds. No wheezing, rhonchi or rales.   Abdominal:      Palpations: Abdomen is soft.   Musculoskeletal:         General: Normal range of motion.      Cervical back: Normal range of motion and neck supple.   Skin:     General: Skin is warm and dry.      Capillary Refill: Capillary refill takes less than 2 seconds.   Neurological:      Mental Status: He is alert. Mental status is at baseline.       Vents:  Vent Mode: A/C (pt placed back on ac, tolerated cpap well x 5hr) (04/18/22 1315)  Ventilator Initiated: Yes (04/10/22 8088)  Set Rate: 14 BPM (04/18/22 0505)  Vt Set: 480 mL (04/18/22 0505)  Pressure Support: 10 cmH20 (04/18/22 1235)  PEEP/CPAP: 5 cmH20 (04/18/22  1235)  Oxygen Concentration (%): 35 (04/18/22 0736)  Peak Airway Pressure: 16 cmH2O (04/18/22 1235)  Plateau Pressure: 20 cmH20 (03/07/22 0500)  Total Ve: 8 mL (04/18/22 1235)  F/VT Ratio<105 (RSBI): (!) 73.3 (04/18/22 0505)    Lines/Drains/Airways       Central Venous Catheter Line  Duration                  Hemodialysis Catheter 12/30/21 0900 right internal jugular 109 days              Drain  Duration                  Colostomy 12/18/21 1030 Descending/sigmoid  days         Gastrostomy/Enterostomy 03/09/22 1436 Percutaneous endoscopic gastrostomy (PEG) midline feeding 40 days              Airway  Duration                  Surgical Airway 01/04/22 Shiley 104 days              Peripheral Intravenous Line  Duration                  Peripheral IV - Single Lumen 04/04/22 1430 22 G Posterior;Right Hand 14 days         Peripheral IV - Single Lumen 04/11/22 1623 18 G Anterior;Proximal;Right Forearm 6 days                    Significant Labs:    CBC/Anemia Profile:  Recent Labs   Lab 04/18/22  1013   WBC 21.49*   HGB 7.0*   HCT 20.7*   *   MCV 88.5   RDW 15.5*          Chemistries:  No results for input(s): NA, K, CL, CO2, BUN, CREATININE, CALCIUM, ALBUMIN, PROT, BILITOT, ALKPHOS, ALT, AST, GLUCOSE, MG, PHOS in the last 48 hours.    All pertinent labs within the past 24 hours have been reviewed.    Significant Imaging:  I have reviewed all pertinent imaging results/findings within the past 24 hours.

## 2022-04-19 NOTE — PROGRESS NOTES
[facility]  Pulmonology  Progress Note    Patient Name: Og Garcia  MRN: [id number]  Admission Date: 12/23/2021  Hospital Length of Stay: 117 days  Code Status: Prior  Attending Provider: Ceicl Abernathy DO  Primary Care Provider: Hector Arndt DNP, FNP-C   Principal Problem: Denice gangrene    Subjective:     Interval History: No acute events overnight. The patient is currently resting comfortably. He is currently afebrile and vital signs are stable.    Objective:     Vital Signs (Most Recent):  Temp: 98.4 °F (36.9 °C) (04/19/22 0800)  Pulse: 106 (04/19/22 1209)  Resp: (!) 22 (04/19/22 1209)  BP: (!) 119/58 (04/19/22 0800)  SpO2: 100 % (04/19/22 1209)   Vital Signs (24h Range):  Temp:  [98 °F (36.7 °C)-99.1 °F (37.3 °C)] 98.4 °F (36.9 °C)  Pulse:  [] 106  Resp:  [14-34] 22  SpO2:  [25 %-100 %] 100 %  BP: (103-134)/(54-72) 119/58     Weight: 78.3 kg (172 lb 9.9 oz)  Body mass index is 24.08 kg/m².      Intake/Output Summary (Last 24 hours) at 4/19/2022 1308  Last data filed at 4/19/2022 0800  Gross per 24 hour   Intake 3050 ml   Output 1000 ml   Net 2050 ml         Physical Exam  Vitals reviewed.   Constitutional:       General: He is not in acute distress.     Appearance: He is ill-appearing.      Comments: Chronically ill appearing   HENT:      Right Ear: External ear normal.      Left Ear: External ear normal.      Mouth/Throat:      Mouth: Mucous membranes are moist.   Eyes:      Pupils: Pupils are equal, round, and reactive to light.   Cardiovascular:      Rate and Rhythm: Normal rate and regular rhythm.   Pulmonary:      Breath sounds: Normal breath sounds. No wheezing, rhonchi or rales.   Abdominal:      Palpations: Abdomen is soft.   Musculoskeletal:         General: Normal range of motion.      Cervical back: Normal range of motion and neck supple.   Skin:     General: Skin is warm and dry.      Capillary Refill: Capillary refill takes less than 2 seconds.    Neurological:      Mental Status: He is alert. Mental status is at baseline.       Vents:  Vent Mode: CPAP PSV (04/19/22 0735)  Ventilator Initiated: Yes (04/10/22 0343)  Set Rate: 14 BPM (04/19/22 0445)  Vt Set: 480 mL (04/19/22 0445)  Pressure Support: 12 cmH20 (04/19/22 0735)  PEEP/CPAP: 5 cmH20 (04/19/22 0735)  Oxygen Concentration (%): 35 (04/19/22 0600)  Peak Airway Pressure: 14 cmH2O (04/19/22 0735)  Plateau Pressure: 20 cmH20 (03/07/22 0500)  Total Ve: 6 mL (04/19/22 0735)  F/VT Ratio<105 (RSBI): (!) 60.87 (04/19/22 0027)    Lines/Drains/Airways       Central Venous Catheter Line  Duration                  Hemodialysis Catheter 12/30/21 0900 right internal jugular 110 days              Drain  Duration                  Colostomy 12/18/21 1030 Descending/sigmoid  days         Gastrostomy/Enterostomy 03/09/22 1436 Percutaneous endoscopic gastrostomy (PEG) midline feeding 40 days              Airway  Duration                  Surgical Airway 01/04/22 Shiley 105 days              Peripheral Intravenous Line  Duration                  Peripheral IV - Single Lumen 04/04/22 1430 22 G Posterior;Right Hand 14 days         Peripheral IV - Single Lumen 04/11/22 1623 18 G Anterior;Proximal;Right Forearm 7 days                    Significant Labs:    CBC/Anemia Profile:  Recent Labs   Lab 04/18/22  1013   WBC 21.49*   HGB 7.0*   HCT 20.7*   *   MCV 88.5   RDW 15.5*          Chemistries:  No results for input(s): NA, K, CL, CO2, BUN, CREATININE, CALCIUM, ALBUMIN, PROT, BILITOT, ALKPHOS, ALT, AST, GLUCOSE, MG, PHOS in the last 48 hours.    All pertinent labs within the past 24 hours have been reviewed.    Significant Imaging:  I have reviewed all pertinent imaging results/findings within the past 24 hours.    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 I think this represents colonization      Chronic respiratory failure  Was doing trach collar over the weekend but had to be placed back on the vent earlier this week   - will treat his pain- hopefully this will improve his HR and then start back on weaning trails   4/7- aspirated yesterday - febrile this morning. Will start Clindamycin today -   4/8- clinically looks better- will resume PSV today as tolerated   4/9- increased respiratory rate and tachycardic on exam.  Patient had just been turned.  This is all likely secondary to pain.  Will treat his pain and then increase his CPAP to 4 hours t.i.d. as tolerated.  4/13- to OR today  Then  -continue PSV as tolerated   4/16- Continue to increase trials as tolerated  4/18 plan for 6 hours tid today    Tachycardia  Treated anemia   - concern this may be releated to pain and pain medication withdraw - his fentanyl patch was discontinued over the weekend which he has been on for weeks.  -plan - will start PO pain medication q8hrs then decrease to q12 hours after 7 days.   4/8 - improving   4/9-heart rate up on exam.  Secondary to pain.  Will treat p.r.n. pain medication.  4/14- had some SVT yesterday that improved with treating his pain and lopressor      Pressure ulcer of sacral region, unstageable  Culture and sensitivity reviewed -now on vancomycin and zosyn   - wound care NP to evaluate to see if he may need debridement   4/13- scheduled for debridement today     ESRD (end stage renal disease)  Started on HD 12/30   Continue with HD per nephrology      Type 2 diabetes mellitus without complication, without long-term current use of insulin  Continue with current care  accuchecks are ok    Acute blood loss anemia  No active bleeding  Hgb 6.0 on 4/11  Patient transfused 1 unit with dialysis yesterday  4/13 -H/H 7.5/22.7  4/15- hgb is 6.2 today. We  are going to give another unit of PRBC's today with dialysis  4/16 post transfusion cbc is pending  4/18- hgb is 6.6. Will plan for transfusion with 1 unit of prbc's with next HD  7.0 today    Hypertension  BP currently ok                   Cecil Abernathy, DO  Pulmonology  [facility]

## 2022-04-19 NOTE — SUBJECTIVE & OBJECTIVE
Interval History: The patient is resting.  No acute changes.  He tolerated dialysis on yesterday.    Review of patient's allergies indicates:  No Known Allergies  Current Facility-Administered Medications   Medication Frequency    0.9%  NaCl infusion (for blood administration) Q24H PRN    0.9%  NaCl infusion PRN    acetaminophen tablet 500 mg Q6H PRN    albuterol nebulizer solution 2.5 mg Q4H PRN    albuterol-ipratropium 2.5 mg-0.5 mg/3 mL nebulizer solution 3 mL Q6H    bisacodyL EC tablet 10 mg Daily PRN    calcium gluconate 1 g in dextrose 5 % in water (D5W) 5 % 100 mL IVPB (MB+) Once    dextrose 50% injection 12.5 g PRN    diltiaZEM tablet 90 mg Q6H    glucagon (human recombinant) injection 1 mg PRN    guaiFENesin 100 mg/5 ml syrup 200 mg Q6H    heparin (porcine) injection 4,000 Units PRN    heparin (porcine) injection 5,000 Units Q8H    HYDROcodone-acetaminophen 5-325 mg per tablet 1 tablet Q12H    insulin aspart U-100 injection 1-10 Units Q6H    insulin detemir U-100 injection 25 Units QHS    metoprolol injection 5 mg Q4H PRN    morphine injection 4 mg Q4H PRN    NORepinephrine 4 mg in dextrose 5 % 250 mL infusion Continuous    ondansetron injection 8 mg Q6H PRN    pantoprazole suspension 40 mg Daily    predniSONE tablet 5 mg Daily    sevelamer carbonate pwpk 0.8 g TID WM    silver sulfADIAZINE 1% cream Daily PRN    simethicone chewable tablet 80 mg TID PRN       Objective:     Vital Signs (Most Recent):  Temp: 98.4 °F (36.9 °C) (04/19/22 0800)  Pulse: 106 (04/19/22 1209)  Resp: (!) 22 (04/19/22 1209)  BP: (!) 119/58 (04/19/22 0800)  SpO2: 100 % (04/19/22 1209)  O2 Device (Oxygen Therapy): ventilator (04/19/22 1209)   Vital Signs (24h Range):  Temp:  [98 °F (36.7 °C)-99.1 °F (37.3 °C)] 98.4 °F (36.9 °C)  Pulse:  [] 106  Resp:  [14-34] 22  SpO2:  [25 %-100 %] 100 %  BP: (103-134)/(54-72) 119/58     Weight: 78.3 kg (172 lb 9.9 oz) (04/19/22 0700)  Body mass index is 24.08 kg/m².  Body surface area is 1.98  meters squared.    I/O last 3 completed shifts:  In: 3920 [P.O.:360; Blood:350; Other:350; NG/GT:2610; IV Piggyback:250]  Out: 1200 [Stool:1200]    Physical Exam  Vitals reviewed.   HENT:      Head: Normocephalic and atraumatic.   Cardiovascular:      Rate and Rhythm: Regular rhythm.      Pulses: Normal pulses.   Pulmonary:      Effort: Pulmonary effort is normal.      Comments: Ventilated.  Abdominal:      Palpations: Abdomen is soft.   Neurological:      Mental Status: He is alert.       Significant Labs:  BMP: No results for input(s): GLU, NA, K, CL, CO2, BUN, CREATININE, CALCIUM, MG in the last 168 hours.  CBC:   Recent Labs   Lab 04/18/22  1013   WBC 21.49*   RBC 2.34*   HGB 7.0*   HCT 20.7*   *   MCV 88.5   MCH 29.9   MCHC 33.8        Significant Imaging:  Labs: Reviewed

## 2022-04-19 NOTE — NURSING
At bedrest opens eyes to verbal stimuli.. pt does not follow any commands.. . Pt  Has trach, on ventilator . Right dialysis catheter intact .. total care comfort and safety measures ongoing will continue to monitor

## 2022-04-19 NOTE — ASSESSMENT & PLAN NOTE
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 I think this represents colonization

## 2022-04-19 NOTE — ASSESSMENT & PLAN NOTE
Dialysis MWF  Continue with scheduled hemodialysis  4/12/2022  Dialysis sessions are going well.  4/13/2022 Continue with scheduled hemodialysis for this patient.  4/14/2022  Continue with dialysis  4/16/2022 Chronic condition which is stable.  4/17/2022  Dialysis as scheduled.  4/19/2022  Continue with dialysis as scheduled.

## 2022-04-19 NOTE — PROGRESS NOTES
[facility]  Nephrology  Progress Note    Patient Name: Og Garcia  MRN: [id number]  Admission Date: 12/23/2021  Hospital Length of Stay: 117 days  Attending Provider: Cecil Abernathy DO   Primary Care Physician: Hector Arndt DNP, FNP-C  Principal Problem:Denice gangrene    Subjective:     HPI: The patient is known from the previous nephrology consult at [facility].  He is no at Special[facility] and continues to need dialysis support.      Interval History: The patient is resting.  No acute changes.  He tolerated dialysis on yesterday.    Review of patient's allergies indicates:  No Known Allergies  Current Facility-Administered Medications   Medication Frequency    0.9%  NaCl infusion (for blood administration) Q24H PRN    0.9%  NaCl infusion PRN    acetaminophen tablet 500 mg Q6H PRN    albuterol nebulizer solution 2.5 mg Q4H PRN    albuterol-ipratropium 2.5 mg-0.5 mg/3 mL nebulizer solution 3 mL Q6H    bisacodyL EC tablet 10 mg Daily PRN    calcium gluconate 1 g in dextrose 5 % in water (D5W) 5 % 100 mL IVPB (MB+) Once    dextrose 50% injection 12.5 g PRN    diltiaZEM tablet 90 mg Q6H    glucagon (human recombinant) injection 1 mg PRN    guaiFENesin 100 mg/5 ml syrup 200 mg Q6H    heparin (porcine) injection 4,000 Units PRN    heparin (porcine) injection 5,000 Units Q8H    HYDROcodone-acetaminophen 5-325 mg per tablet 1 tablet Q12H    insulin aspart U-100 injection 1-10 Units Q6H    insulin detemir U-100 injection 25 Units QHS    metoprolol injection 5 mg Q4H PRN    morphine injection 4 mg Q4H PRN    NORepinephrine 4 mg in dextrose 5 % 250 mL infusion Continuous    ondansetron injection 8 mg Q6H PRN    pantoprazole suspension 40 mg Daily    predniSONE tablet 5 mg Daily    sevelamer carbonate pwpk 0.8 g TID WM    silver sulfADIAZINE 1% cream Daily PRN    simethicone chewable tablet 80 mg TID PRN       Objective:     Vital Signs (Most Recent):  Temp: 98.4 °F (36.9  °C) (04/19/22 0800)  Pulse: 106 (04/19/22 1209)  Resp: (!) 22 (04/19/22 1209)  BP: (!) 119/58 (04/19/22 0800)  SpO2: 100 % (04/19/22 1209)  O2 Device (Oxygen Therapy): ventilator (04/19/22 1209)   Vital Signs (24h Range):  Temp:  [98 °F (36.7 °C)-99.1 °F (37.3 °C)] 98.4 °F (36.9 °C)  Pulse:  [] 106  Resp:  [14-34] 22  SpO2:  [25 %-100 %] 100 %  BP: (103-134)/(54-72) 119/58     Weight: 78.3 kg (172 lb 9.9 oz) (04/19/22 0700)  Body mass index is 24.08 kg/m².  Body surface area is 1.98 meters squared.    I/O last 3 completed shifts:  In: 3920 [P.O.:360; Blood:350; Other:350; NG/GT:2610; IV Piggyback:250]  Out: 1200 [Stool:1200]    Physical Exam  Vitals reviewed.   HENT:      Head: Normocephalic and atraumatic.   Cardiovascular:      Rate and Rhythm: Regular rhythm.      Pulses: Normal pulses.   Pulmonary:      Effort: Pulmonary effort is normal.      Comments: Ventilated.  Abdominal:      Palpations: Abdomen is soft.   Neurological:      Mental Status: He is alert.       Significant Labs:  BMP: No results for input(s): GLU, NA, K, CL, CO2, BUN, CREATININE, CALCIUM, MG in the last 168 hours.  CBC:   Recent Labs   Lab 04/18/22  1013   WBC 21.49*   RBC 2.34*   HGB 7.0*   HCT 20.7*   *   MCV 88.5   MCH 29.9   MCHC 33.8        Significant Imaging:  Labs: Reviewed    Assessment/Plan:     ESRD (end stage renal disease)  Dialysis MWF  Continue with scheduled hemodialysis  4/12/2022  Dialysis sessions are going well.  4/13/2022 Continue with scheduled hemodialysis for this patient.  4/14/2022  Continue with dialysis  4/16/2022 Chronic condition which is stable.  4/17/2022  Dialysis as scheduled.  4/19/2022  Continue with dialysis as scheduled.        Thank you for your consult. I will follow-up with patient. Please contact us if you have any additional questions.    Edwin Pryor Jr, MD  Nephrology  [facility]

## 2022-04-19 NOTE — PLAN OF CARE
Problem: Infection  Goal: Absence of Infection Signs and Symptoms  Outcome: Ongoing, Progressing     Problem: Communication Impairment (Mechanical Ventilation, Invasive)  Goal: Effective Communication  Outcome: Ongoing, Progressing     Problem: Device-Related Complication Risk (Mechanical Ventilation, Invasive)  Goal: Optimal Device Function  Outcome: Ongoing, Progressing     Problem: Inability to Wean (Mechanical Ventilation, Invasive)  Goal: Mechanical Ventilation Liberation  Outcome: Ongoing, Progressing     Problem: Skin and Tissue Injury (Mechanical Ventilation, Invasive)  Goal: Absence of Device-Related Skin and Tissue Injury  Outcome: Ongoing, Progressing

## 2022-04-19 NOTE — ASSESSMENT & PLAN NOTE
No active bleeding  Hgb 6.0 on 4/11  Patient transfused 1 unit with dialysis yesterday  4/13 -H/H 7.5/22.7  4/15- hgb is 6.2 today. We are going to give another unit of PRBC's today with dialysis  4/16 post transfusion cbc is pending  4/18- hgb is 6.6. Will plan for transfusion with 1 unit of prbc's with next HD  7.0 today

## 2022-04-19 NOTE — SUBJECTIVE & OBJECTIVE
Interval History: No acute events overnight. The patient is currently resting comfortably. He is currently afebrile and vital signs are stable.    Objective:     Vital Signs (Most Recent):  Temp: 98.4 °F (36.9 °C) (04/19/22 0800)  Pulse: 106 (04/19/22 1209)  Resp: (!) 22 (04/19/22 1209)  BP: (!) 119/58 (04/19/22 0800)  SpO2: 100 % (04/19/22 1209)   Vital Signs (24h Range):  Temp:  [98 °F (36.7 °C)-99.1 °F (37.3 °C)] 98.4 °F (36.9 °C)  Pulse:  [] 106  Resp:  [14-34] 22  SpO2:  [25 %-100 %] 100 %  BP: (103-134)/(54-72) 119/58     Weight: 78.3 kg (172 lb 9.9 oz)  Body mass index is 24.08 kg/m².      Intake/Output Summary (Last 24 hours) at 4/19/2022 1308  Last data filed at 4/19/2022 0800  Gross per 24 hour   Intake 3050 ml   Output 1000 ml   Net 2050 ml         Physical Exam  Vitals reviewed.   Constitutional:       General: He is not in acute distress.     Appearance: He is ill-appearing.      Comments: Chronically ill appearing   HENT:      Right Ear: External ear normal.      Left Ear: External ear normal.      Mouth/Throat:      Mouth: Mucous membranes are moist.   Eyes:      Pupils: Pupils are equal, round, and reactive to light.   Cardiovascular:      Rate and Rhythm: Normal rate and regular rhythm.   Pulmonary:      Breath sounds: Normal breath sounds. No wheezing, rhonchi or rales.   Abdominal:      Palpations: Abdomen is soft.   Musculoskeletal:         General: Normal range of motion.      Cervical back: Normal range of motion and neck supple.   Skin:     General: Skin is warm and dry.      Capillary Refill: Capillary refill takes less than 2 seconds.   Neurological:      Mental Status: He is alert. Mental status is at baseline.       Vents:  Vent Mode: CPAP PSV (04/19/22 5232)  Ventilator Initiated: Yes (04/10/22 0343)  Set Rate: 14 BPM (04/19/22 0445)  Vt Set: 480 mL (04/19/22 0445)  Pressure Support: 12 cmH20 (04/19/22 0735)  PEEP/CPAP: 5 cmH20 (04/19/22 0735)  Oxygen Concentration (%): 35 (04/19/22  0600)  Peak Airway Pressure: 14 cmH2O (04/19/22 0735)  Plateau Pressure: 20 cmH20 (03/07/22 0500)  Total Ve: 6 mL (04/19/22 0735)  F/VT Ratio<105 (RSBI): (!) 60.87 (04/19/22 0027)    Lines/Drains/Airways       Central Venous Catheter Line  Duration                  Hemodialysis Catheter 12/30/21 0900 right internal jugular 110 days              Drain  Duration                  Colostomy 12/18/21 1030 Descending/sigmoid  days         Gastrostomy/Enterostomy 03/09/22 1436 Percutaneous endoscopic gastrostomy (PEG) midline feeding 40 days              Airway  Duration                  Surgical Airway 01/04/22 Shiley 105 days              Peripheral Intravenous Line  Duration                  Peripheral IV - Single Lumen 04/04/22 1430 22 G Posterior;Right Hand 14 days         Peripheral IV - Single Lumen 04/11/22 1623 18 G Anterior;Proximal;Right Forearm 7 days                    Significant Labs:    CBC/Anemia Profile:  Recent Labs   Lab 04/18/22  1013   WBC 21.49*   HGB 7.0*   HCT 20.7*   *   MCV 88.5   RDW 15.5*          Chemistries:  No results for input(s): NA, K, CL, CO2, BUN, CREATININE, CALCIUM, ALBUMIN, PROT, BILITOT, ALKPHOS, ALT, AST, GLUCOSE, MG, PHOS in the last 48 hours.    All pertinent labs within the past 24 hours have been reviewed.    Significant Imaging:  I have reviewed all pertinent imaging results/findings within the past 24 hours.

## 2022-04-19 NOTE — PROGRESS NOTES
[facility]  Adult Nutrition  Follow-up Note         Reason for Assessment  Reason For Assessment: RD follow-up  Nutrition Risk Screen: tube feeding or parenteral nutrition  Malnutrition  Is Patient Malnourished: No  Nutrition Diagnosis  Inadequate oral intake   related to Decreased ability to consume sufficient energy as evidenced by pt on vent    Nutrition Diagnosis Status: Chronic/ continues      Nutrition Risk  Level of Risk/Frequency of Follow-up: high   Chewing or Swallowing Difficulty?: Swallowing difficulty  Estimated/Assessed Needs  RMR ([place]-St. Jeor Equation): 1585.13 Activity Factor: 1 Injury Factor: 1.2   Total Ve: 8.9 mL Temp: 98.4 °F (36.9 °C)Axillary  Weight Used For Calorie Calculations: 78.3 kg (172 lb 9.9 oz)   Energy Need Method: [place] (modified) Energy Calorie Requirements (kcal): 1779  Weight Used For Protein Calculations: 83.3 kg (183 lb 10.3 oz)  Protein Requirements: 100-125  Estimated Fluid Requirement Method: RDA Method Fluid Requirements (mL): 2105  RDA Method (mL): 1779     Nutrition Prescription / Recommendations  Recommendation/Intervention: Continue: PEG Tube feeding: Nepro @ 60ml/hr; H20 flush of 50ml q4hr  Goals: pt will meet estimated nutritional needs; wound healing, TF tolerance  Nutrition Goal Status: progressing towards goal  Communication of RD Recs: reviewed with physician  Current Diet Order: PEG Tube feeding: Nepro @ 60ml/hr; H20 flush of 50ml q4hr  Nutrition Order Comments: PEG Tube feeding: Nepro @ 60ml/hr; H20 flush of 50ml q4hr  Current Nutrition Support Formula Ordered: Nepro  Current Nutrition Support Rate Ordered: 60 (ml)  Current Nutrition Support Frequency Ordered: hourly  Recommended Diet: Fiber restricted and Enteral Nutrition  Recommended Oral Supplement: Matthew [90 kcals, 2.5g Protein, 10g Carbs(3g Sugar), 7g L-Arginine, 7g L-Glutamine, Vitamin C 300mg, 9.5mg Zinc] twice daily  Is Nutrition Support Recommended:yes  Is Education  Recommended: No  Monitor and Evaluation  % current Intake: Enteral Nutrition at goal  % intake to meet estimated needs: Enteral Nutrition   Food and Nutrient Intake: enteral nutrition intake  Food and Nutrient Adminstration: enteral and parenteral nutrition administration  Anthropometric Measurements: height/length, body mass index, weight, weight change  Biochemical Data, Medical Tests and Procedures: electrolyte and renal panel, glucose/endocrine profile  Nutrition-Focused Physical Findings: skin  Enteral Calories (kcal): 2592  Enteral Protein (gm): 115  Enteral (Free Water) Fluid (mL): 1037  Free Water Flush Fluid (mL): 300  Parenteral Calories (kcal): 845  Parenteral Protein (gm): 48  Parenteral Fluid (mL): 960  Lipid Calories (kcals): 500 kcals  Other Calories (kcal): 528 (propofol)  Total Calories (kcal): 2160  Total Calories (kcal/kg): 2612  % Kcal Needs: 100  Total Protein (gm): 135  % Protein Needs: 100  IV Fluid (mL): 1764  Total Fluid Intake (mL): 1337  Energy Calories Required: meeting needs  Protein Required: meeting needs  Fluid Required: meeting needs  Tolerance: tolerating  Current Medical Diagnosis and Past Medical History  Diagnosis: gastrointestinal disease, infection/sepsis  Past Medical History:   Diagnosis Date    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  Last A1c: No results found for: HGBA1C  Lab Results   Component Value Date    RBC 2.34 (L) 04/18/2022    HGB 7.0 (L) 04/18/2022    HCT 20.7 (L) 04/18/2022    MCV 88.5 04/18/2022    MCH 29.9 04/18/2022    MCHC 33.8 04/18/2022  "    Pertinent Labs Reviewed: reviewed  Pertinent Labs Comments: Na 133  Pertinent Medications Reviewed: reviewed  Pertinent Medications Comments: heparin, insulin  Anthropometrics  Temp: 98.4 °F (36.9 °C)  Height Method: Stated  Height: 5' 11" (180.3 cm)  Height (inches): 71 in  Weight Method: Bed Scale  Weight: 78.3 kg (172 lb 9.9 oz)  Weight (lb): 172.62 lb  Ideal Body Weight (IBW), Male: 172 lb  % Ideal Body Weight, Male (lb): 106.77 %  BMI (Calculated): 24.1  BMI Grade: 25 - 29.9 - overweight     Nutrition by Nursing  Diet/Nutrition Received: tube feeding  Intake (%): 100%  Diet/Feeding Assistance: total feed  Diet/Feeding Tolerance: other (see comments) ([place] tf well/ aniyha bid)  Last Bowel Movement: 04/18/22  [REMOVED]      NG/OG Tube [place] sump 18 Fr. Left nostril-Feeding Type: continuous, by pump       Gastrostomy/Enterostomy 03/09/22 1436 Percutaneous endoscopic gastrostomy (PEG) midline feeding-Feeding Type: continuous, by pump  [REMOVED]      NG/OG Tube [place] sump 18 Fr. Left nostril-Current Rate (mL/hr): 50 mL/hr       Gastrostomy/Enterostomy 03/09/22 1436 Percutaneous endoscopic gastrostomy (PEG) midline feeding-Current Rate (mL/hr): 60 mL/hr  [REMOVED]      NG/OG Tube [place] sump 18 Fr. Left nostril-Goal Rate (mL/hr): 75 mL/hr       Gastrostomy/Enterostomy 03/09/22 1436 Percutaneous endoscopic gastrostomy (PEG) midline feeding-Goal Rate (mL/hr): 60 mL/hr  [REMOVED]      NG/OG Tube [place] sump 18 Fr. Left nostril-Formula Name: nepro 1.8       Gastrostomy/Enterostomy 03/09/22 1436 Percutaneous endoscopic gastrostomy (PEG) midline feeding-Formula Name: nepro  Nutrition Follow-Up  RD Follow-up?: Yes  Assessment and Plan  No new Assessment & Plan notes have been filed under this hospital service since the last note was generated.  Service: Nutrition     "

## 2022-04-20 NOTE — NURSING
Awake at bedrest total care .. unable to flush int in right thumb area .. cannule/int d/c,, no bleeding noted.. safety measures ongoing  drsgs to austyn foot/heel dry and intact.. drsg to sacral/buttocks area intact and dry

## 2022-04-20 NOTE — PROGRESS NOTES
[facility]  Pulmonology  Progress Note    Patient Name: Og Garcia  MRN: [id number]  Admission Date: 12/23/2021  Hospital Length of Stay: 118 days  Code Status: Prior  Attending Provider: Cecil Abernathy DO  Primary Care Provider: Hector Arndt DNP, FNP-C   Principal Problem: Denice gangrene    Subjective:     Interval History: No acute events overnight. The patient is currently resting comfortably. He is currently afebrile this am and vital signs are stable.    Objective:     Vital Signs (Most Recent):  Temp: (!) 100.5 °F (38.1 °C) (04/20/22 0800)  Pulse: (!) 116 (04/20/22 1245)  Resp: (!) 35 (04/20/22 1245)  BP: (!) 126/59 (04/20/22 1245)  SpO2: 100 % (04/20/22 1245)   Vital Signs (24h Range):  Temp:  [98.8 °F (37.1 °C)-100.5 °F (38.1 °C)] 100.5 °F (38.1 °C)  Pulse:  [105-116] 116  Resp:  [18-38] 35  SpO2:  [94 %-100 %] 100 %  BP: (117-151)/(55-75) 126/59     Weight: 78.8 kg (173 lb 11.6 oz)  Body mass index is 24.23 kg/m².      Intake/Output Summary (Last 24 hours) at 4/20/2022 1256  Last data filed at 4/20/2022 0600  Gross per 24 hour   Intake 1920 ml   Output 50 ml   Net 1870 ml         Physical Exam  Vitals reviewed.   Constitutional:       General: He is not in acute distress.     Appearance: He is ill-appearing.      Comments: Chronically ill appearing   HENT:      Right Ear: External ear normal.      Left Ear: External ear normal.      Mouth/Throat:      Mouth: Mucous membranes are moist.   Eyes:      Pupils: Pupils are equal, round, and reactive to light.   Cardiovascular:      Rate and Rhythm: Normal rate and regular rhythm.   Pulmonary:      Breath sounds: Normal breath sounds. No wheezing, rhonchi or rales.   Abdominal:      Palpations: Abdomen is soft.   Musculoskeletal:         General: Normal range of motion.      Cervical back: Normal range of motion and neck supple.   Skin:     General: Skin is warm and dry.      Capillary Refill: Capillary refill takes less  than 2 seconds.   Neurological:      Mental Status: He is alert. Mental status is at baseline.       Vents:  Vent Mode: A/C (Pt placed on Cpap) (04/20/22 0750)  Ventilator Initiated: Yes (04/10/22 0343)  Set Rate: 14 BPM (04/20/22 0750)  Vt Set: 480 mL (04/20/22 0750)  Pressure Support: 10 cmH20 (04/20/22 0031)  PEEP/CPAP: 5 cmH20 (04/20/22 0750)  Oxygen Concentration (%): 35 (04/20/22 0750)  Peak Airway Pressure: 20 cmH2O (04/20/22 0750)  Plateau Pressure: 20 cmH20 (03/07/22 0500)  Total Ve: 11.6 mL (04/20/22 0750)  F/VT Ratio<105 (RSBI): (!) 42.28 (04/20/22 0750)    Lines/Drains/Airways       Central Venous Catheter Line  Duration                  Hemodialysis Catheter 12/30/21 0900 right internal jugular 111 days              Drain  Duration                  Colostomy 12/18/21 1030 Descending/sigmoid  days         Gastrostomy/Enterostomy 03/09/22 1436 Percutaneous endoscopic gastrostomy (PEG) midline feeding 41 days              Airway  Duration                  Surgical Airway 01/04/22 Shiley 106 days              Peripheral Intravenous Line  Duration                  Peripheral IV - Single Lumen 04/11/22 1623 18 G Anterior;Proximal;Right Forearm 8 days                    Significant Labs:    CBC/Anemia Profile:  No results for input(s): WBC, HGB, HCT, PLT, MCV, RDW, IRON, FERRITIN, RETIC, FOLATE, [id number], OCCULTBLOOD in the last 48 hours.       Chemistries:  No results for input(s): NA, K, CL, CO2, BUN, CREATININE, CALCIUM, ALBUMIN, PROT, BILITOT, ALKPHOS, ALT, AST, GLUCOSE, MG, PHOS in the last 48 hours.    All pertinent labs within the past 24 hours have been reviewed.    Significant Imaging:  I have reviewed all pertinent imaging results/findings within the past 24 hours.    Assessment/Plan:     * Denice gangrene  - s/p multiple debridements  - pathology with fungal species consistent with mucormycosis initiated on amphotericin  - 2/21 back to OR for skin grafting  - 3/9 had peg tube placed    Currently stable with no acute issues  Wound culture with light proteus and rare growth pseudomonas. I think this represents colonization      Chronic respiratory failure  Was doing trach collar over the weekend but had to be placed back on the vent earlier this week   - will treat his pain- hopefully this will improve his HR and then start back on weaning trails   4/7- aspirated yesterday - febrile this morning. Will start Clindamycin today -   4/8- clinically looks better- will resume PSV today as tolerated   4/9- increased respiratory rate and tachycardic on exam.  Patient had just been turned.  This is all likely secondary to pain.  Will treat his pain and then increase his CPAP to 4 hours t.i.d. as tolerated.  4/13- to OR today  Then  -continue PSV as tolerated   4/16- Continue to increase trials as tolerated  4/18 plan for 6 hours tid today  4/20- plan for 6 hours tid again today    Tachycardia  Treated anemia   - concern this may be releated to pain and pain medication withdraw - his fentanyl patch was discontinued over the weekend which he has been on for weeks.  -plan - will start PO pain medication q8hrs then decrease to q12 hours after 7 days.   4/8 - improving   4/9-heart rate up on exam.  Secondary to pain.  Will treat p.r.n. pain medication.  4/14- had some SVT yesterday that improved with treating his pain and lopressor  4/20- currently no issues. Mildly tachycardic      Pressure ulcer of sacral region, unstageable  Culture and sensitivity reviewed -now on vancomycin and zosyn   - wound care NP to evaluate to see if he may need debridement   4/13- scheduled for debridement today     ESRD (end stage renal disease)  Started on HD 12/30   Continue with HD per nephrology      Type 2 diabetes mellitus without complication, without long-term current use of insulin  Continue with current care  accuchecks are ok    Acute blood loss anemia  No active bleeding  Hgb 6.0 on 4/11  Patient transfused 1 unit with  dialysis yesterday  4/13 -H/H 7.5/22.7  4/15- hgb is 6.2 today. We are going to give another unit of PRBC's today with dialysis  4/16 post transfusion cbc is pending  4/18- hgb is 6.6. Will plan for transfusion with 1 unit of prbc's with next HD  7.0 today    Hypertension  BP currently ok                   Cecil Abernathy, DO  Pulmonology  [facility]

## 2022-04-20 NOTE — ASSESSMENT & PLAN NOTE
Treated anemia   - concern this may be releated to pain and pain medication withdraw - his fentanyl patch was discontinued over the weekend which he has been on for weeks.  -plan - will start PO pain medication q8hrs then decrease to q12 hours after 7 days.   4/8 - improving   4/9-heart rate up on exam.  Secondary to pain.  Will treat p.r.n. pain medication.  4/14- had some SVT yesterday that improved with treating his pain and lopressor  4/20- currently no issues. Mildly tachycardic

## 2022-04-20 NOTE — DIALYSIS ROUNDING
"          Nephrology Department            NAME: Og Garcia   YOB: 1955  MRN: [id number]  NOTE DATE: 04/20/2022       Seen on dialysis.  He is tolerating the procedure.    Patient complains:  None.      REVIEW OF SYSTEMS:  he reports no shortness of breath, nausea or vomiting. No acute changes.    VITALS:  height is 5' 11" (1.803 m) and weight is 78.8 kg (173 lb 11.6 oz). His axillary temperature is 101 °F (38.3 °C) (abnormal). His blood pressure is 124/57 (abnormal) and his pulse is 111 (abnormal). His respiration is 16 and oxygen saturation is 100%.  Hemodialysis documentation flowsheet reviewed with dialysis nurse, including weight and vitals.    Resting comfortably.  Respiration unlabored. Lungs clear.  Heart regular, no rub.  Abdomen soft, nontender, positive bowl sounds  No edema.    No results for input(s): NA, K, CL, CO2, BUN, CREATININE, GLUCOSE, CALCIUM, PHOS in the last 168 hours.    Invalid input(s): EGFR, LABALBU  Recent Labs   Lab 04/15/22  0551 04/16/22  1031 04/18/22  1013   WBC 25.38* 24.44* 21.49*   HGB 6.2* 6.6* 7.0*   HCT 18.9* 19.2* 20.7*   * 402* 433*       ASSESSMENT/PLAN:  Continue with scheduled hemodialysis for this patient.    Edwin Pryor Jr, MD  "

## 2022-04-20 NOTE — PLAN OF CARE
Problem: Adult Inpatient Plan of Care  Goal: Plan of Care Review  Outcome: Ongoing, Progressing  Goal: Patient-Specific Goal (Individualized)  Outcome: Ongoing, Progressing  Goal: Optimal Comfort and Wellbeing  Outcome: Ongoing, Progressing     Problem: Bleeding (Sepsis/Septic Shock)  Goal: Absence of Bleeding  Outcome: Ongoing, Progressing     Problem: Glycemic Control Impaired (Sepsis/Septic Shock)  Goal: Blood Glucose Level Within Desired Range  Outcome: Ongoing, Progressing     Problem: Nutrition Impaired (Sepsis/Septic Shock)  Goal: Optimal Nutrition Intake  Outcome: Ongoing, Progressing     Problem: Adult Inpatient Plan of Care  Goal: Absence of Hospital-Acquired Illness or Injury  Outcome: Ongoing, Not Progressing  Goal: Readiness for Transition of Care  Outcome: Ongoing, Not Progressing     Problem: Fluid and Electrolyte Imbalance (Acute Kidney Injury/Impairment)  Goal: Fluid and Electrolyte Balance  Outcome: Ongoing, Not Progressing

## 2022-04-20 NOTE — ASSESSMENT & PLAN NOTE
Was doing trach collar over the weekend but had to be placed back on the vent earlier this week   - will treat his pain- hopefully this will improve his HR and then start back on weaning trails   4/7- aspirated yesterday - febrile this morning. Will start Clindamycin today -   4/8- clinically looks better- will resume PSV today as tolerated   4/9- increased respiratory rate and tachycardic on exam.  Patient had just been turned.  This is all likely secondary to pain.  Will treat his pain and then increase his CPAP to 4 hours t.i.d. as tolerated.  4/13- to OR today  Then  -continue PSV as tolerated   4/16- Continue to increase trials as tolerated  4/18 plan for 6 hours tid today  4/20- plan for 6 hours tid again today

## 2022-04-20 NOTE — SUBJECTIVE & OBJECTIVE
Interval History: No acute events overnight. The patient is currently resting comfortably. He is currently afebrile this am and vital signs are stable.    Objective:     Vital Signs (Most Recent):  Temp: (!) 100.5 °F (38.1 °C) (04/20/22 0800)  Pulse: (!) 116 (04/20/22 1245)  Resp: (!) 35 (04/20/22 1245)  BP: (!) 126/59 (04/20/22 1245)  SpO2: 100 % (04/20/22 1245)   Vital Signs (24h Range):  Temp:  [98.8 °F (37.1 °C)-100.5 °F (38.1 °C)] 100.5 °F (38.1 °C)  Pulse:  [105-116] 116  Resp:  [18-38] 35  SpO2:  [94 %-100 %] 100 %  BP: (117-151)/(55-75) 126/59     Weight: 78.8 kg (173 lb 11.6 oz)  Body mass index is 24.23 kg/m².      Intake/Output Summary (Last 24 hours) at 4/20/2022 1256  Last data filed at 4/20/2022 0600  Gross per 24 hour   Intake 1920 ml   Output 50 ml   Net 1870 ml         Physical Exam  Vitals reviewed.   Constitutional:       General: He is not in acute distress.     Appearance: He is ill-appearing.      Comments: Chronically ill appearing   HENT:      Right Ear: External ear normal.      Left Ear: External ear normal.      Mouth/Throat:      Mouth: Mucous membranes are moist.   Eyes:      Pupils: Pupils are equal, round, and reactive to light.   Cardiovascular:      Rate and Rhythm: Normal rate and regular rhythm.   Pulmonary:      Breath sounds: Normal breath sounds. No wheezing, rhonchi or rales.   Abdominal:      Palpations: Abdomen is soft.   Musculoskeletal:         General: Normal range of motion.      Cervical back: Normal range of motion and neck supple.   Skin:     General: Skin is warm and dry.      Capillary Refill: Capillary refill takes less than 2 seconds.   Neurological:      Mental Status: He is alert. Mental status is at baseline.       Vents:  Vent Mode: A/C (Pt placed on Cpap) (04/20/22 0750)  Ventilator Initiated: Yes (04/10/22 5323)  Set Rate: 14 BPM (04/20/22 0750)  Vt Set: 480 mL (04/20/22 0750)  Pressure Support: 10 cmH20 (04/20/22 0031)  PEEP/CPAP: 5 cmH20 (04/20/22  0750)  Oxygen Concentration (%): 35 (04/20/22 0750)  Peak Airway Pressure: 20 cmH2O (04/20/22 0750)  Plateau Pressure: 20 cmH20 (03/07/22 0500)  Total Ve: 11.6 mL (04/20/22 0750)  F/VT Ratio<105 (RSBI): (!) 42.28 (04/20/22 0750)    Lines/Drains/Airways       Central Venous Catheter Line  Duration                  Hemodialysis Catheter 12/30/21 0900 right internal jugular 111 days              Drain  Duration                  Colostomy 12/18/21 1030 Descending/sigmoid  days         Gastrostomy/Enterostomy 03/09/22 1436 Percutaneous endoscopic gastrostomy (PEG) midline feeding 41 days              Airway  Duration                  Surgical Airway 01/04/22 Shiley 106 days              Peripheral Intravenous Line  Duration                  Peripheral IV - Single Lumen 04/11/22 1623 18 G Anterior;Proximal;Right Forearm 8 days                    Significant Labs:    CBC/Anemia Profile:  No results for input(s): WBC, HGB, HCT, PLT, MCV, RDW, IRON, FERRITIN, RETIC, FOLATE, [id number], OCCULTBLOOD in the last 48 hours.       Chemistries:  No results for input(s): NA, K, CL, CO2, BUN, CREATININE, CALCIUM, ALBUMIN, PROT, BILITOT, ALKPHOS, ALT, AST, GLUCOSE, MG, PHOS in the last 48 hours.    All pertinent labs within the past 24 hours have been reviewed.    Significant Imaging:  I have reviewed all pertinent imaging results/findings within the past 24 hours.

## 2022-04-21 NOTE — PLAN OF CARE
Problem: Adult Inpatient Plan of Care  Goal: Plan of Care Review  Outcome: Ongoing, Progressing  Goal: Optimal Comfort and Wellbeing  Outcome: Ongoing, Progressing     Problem: Adult Inpatient Plan of Care  Goal: Patient-Specific Goal (Individualized)  Outcome: Ongoing, Not Progressing  Goal: Absence of Hospital-Acquired Illness or Injury  Outcome: Ongoing, Not Progressing

## 2022-04-21 NOTE — ASSESSMENT & PLAN NOTE
Was doing trach collar over the weekend but had to be placed back on the vent earlier this week   - will treat his pain- hopefully this will improve his HR and then start back on weaning trails   4/7- aspirated yesterday - febrile this morning. Will start Clindamycin today -   4/8- clinically looks better- will resume PSV today as tolerated   4/9- increased respiratory rate and tachycardic on exam.  Patient had just been turned.  This is all likely secondary to pain.  Will treat his pain and then increase his CPAP to 4 hours t.i.d. as tolerated.  4/13- to OR today  Then  -continue PSV as tolerated   4/21- plan for16 hours PSV and rest on vent at night

## 2022-04-21 NOTE — CONSULTS
INFECTIOUS DISEASE CONSULT NOTE   Admit Date: 12/23/2021  CONSULT FOR :  Antibiotic assistance, new fever yesterday  Chief Complaint:  Denice gangrene    HPI:      66-year-old man who has been in hospital for more than 4 months since initial admission for Denice's gangrene has been on the vent the entire time.  He has been treated with antibiotics on and off for wounds, Pseudomonas bacteremia about 2 months ago, etc..  He spiked a fever to more than 102 yesterday and his decubitus wounds have been worsening somewhat some necrotic tissue in areas.  Sacral wound cultured Proteus along with her carbapenems resistant Pseudomonas a few days ago.  I am asked to recommend antibiotic therapy.  He has not required vasopressors.    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    Hyperlipidemia     Hypertension     On mechanically assisted ventilation 12/14/2021        PMSx:      Past Surgical History:   Procedure Laterality Date    COLOSTOMY N/A 12/18/2021    Procedure: CREATION, COLOSTOMY;  Surgeon: Veronica Hui MD;  Location: [facility];  Service: General;  Laterality: N/A;    DEBRIDEMENT OF LOWER EXTREMITY Right 1/4/2022    Procedure: DEBRIDEMENT, LOWER EXTREMITY;  Surgeon: Harish Cazares MD;  Location: [facility];  Service: General;  Laterality: Right;    DEBRIDEMENT OF LOWER EXTREMITY Bilateral 4/13/2022    Procedure: DEBRIDEMENT, LOWER EXTREMITY;  Surgeon: Harish Cazares MD;  Location: [facility];  Service: General;  Laterality: Bilateral;    DEBRIDEMENT OF SACRAL WOUND N/A 3/25/2022    Procedure: DEBRIDEMENT, WOUND, SACRUM;  Surgeon: Harish Cazares MD;  Location: [facility];  Service: General;  Laterality: N/A;    DEBRIDEMENT OF SACRAL WOUND N/A 4/13/2022    Procedure: DEBRIDEMENT, WOUND, SACRUM;  Surgeon: Harish Cazares MD;  Location: [facility];   Service: General;  Laterality: N/A;    ESOPHAGOGASTRODUODENOSCOPY W/ PEG N/A 3/9/2022    Procedure: EGD, WITH PEG TUBE INSERTION;  Surgeon: Harish Cazares MD;  Location: [facility] OR;  Service: General;  Laterality: N/A;    INCISION AND DRAINAGE OF ABSCESS N/A 12/13/2021    Procedure: INCISION AND DRAINAGE, ABSCESS PERINEAL REGION;  Surgeon: Harish Cazares MD;  Location: [facility] OR;  Service: General;  Laterality: N/A;  perirectal    LAPAROTOMY N/A 12/30/2021    Procedure: LAPAROTOMY, PELVIC WASHOUT;  Surgeon: Veronica Hui MD;  Location: [facility] OR;  Service: General;  Laterality: N/A;    TRACHEOSTOMY N/A 1/4/2022    Procedure: CREATION, TRACHEOSTOMY;  Surgeon: Harish Cazares MD;  Location: [facility] OR;  Service: General;  Laterality: N/A;        Social Hx:      Social History     Socioeconomic History    Marital status:    Tobacco Use    Smoking status: Current Every Day Smoker     Packs/day: 0.50     Types: Cigarettes    Smokeless tobacco: Never Used   Substance and Sexual Activity    Alcohol use: Not Currently    Drug use: Never    Sexual activity: Yes        Family Hx:      Family History   Problem Relation Age of Onset    Hypertension Mother     Hypertension Father     Diabetes Father     Cancer Father     `  Review of patient's allergies indicates:  No Known Allergies    Medications:      Current Facility-Administered Medications   Medication    0.9%  NaCl infusion (for blood administration)    0.9%  NaCl infusion    acetaminophen tablet 500 mg    albuterol nebulizer solution 2.5 mg    albuterol-ipratropium 2.5 mg-0.5 mg/3 mL nebulizer solution 3 mL    bisacodyL EC tablet 10 mg    calcium gluconate 1 g in dextrose 5 % in water (D5W) 5 % 100 mL IVPB (MB+)    dextrose 50% injection 12.5 g    diltiaZEM tablet 90 mg    glucagon (human recombinant) injection 1 mg    guaiFENesin 100 mg/5 ml syrup 200 mg    heparin (porcine) injection 4,000 Units    heparin (porcine)  injection 5,000 Units    insulin aspart U-100 injection 1-10 Units    insulin detemir U-100 injection 25 Units    metoprolol injection 5 mg    NORepinephrine 4 mg in dextrose 5 % 250 mL infusion    ondansetron injection 8 mg    pantoprazole suspension 40 mg    predniSONE tablet 5 mg    sevelamer carbonate pwpk 0.8 g    silver sulfADIAZINE 1% cream    simethicone chewable tablet 80 mg       VITALS:      Vital Signs Range (Last 24H):  Temp:  [98 °F (36.7 °C)-101 °F (38.3 °C)]   Pulse:  [108-117]   Resp:  [12-39]   BP: (100-150)/(48-79)   SpO2:  [83 %-100 %]     I & O (Last 24H):    Intake/Output Summary (Last 24 hours) at 4/21/2022 1139  Last data filed at 4/21/2022 0800  Gross per 24 hour   Intake 2400 ml   Output 2700 ml   Net -300 ml       Physical Exam   Constitutional: Pt is awake but does not follow commands, malnourished appearing compared to when I saw him a couple months ago last.   Head: Normocephalic and atraumatic.   Eyes, nose and throat:  Pale moist mucosa, anicteric and acyanotic, ELÍAS, no oral or pharyngeal exudates  Neck: Neck supple, no cervical lymphadenopathy, no thyroid gland enlargement   Cardiovascular: Normal rate and regular rhythm.  S1 and S2 appreciated by ascultation, no murmurs  Pulmonary/Chest:  On the vent, harsh breath sounds  Abdomen:  Peg present, healed skin graft from large abdominal wound earlier this year, not distended, normal bowel sounds.   Extremities: No edema. No clubbing or cyanosis  Skin: Warm and dry. No rashes.  Photographs of wound noted, scattered over body, some necrotic tissue in areas    Laboratory Data:  Reviewed and noted in plan where applicable- Please see chart for full laboratory data.    No results for input(s): CPK, CPKMB, TROPONINI, MB in the last 24 hours. No results for input(s): POCTGLUCOSE in the last 24 hours.     Lab Results   Component Value Date    INR 1.22 (H) 02/15/2022    INR 1.35 (H) 12/31/2021       Lab Results   Component Value Date     WBC 21.49 (H) 04/18/2022    HGB 7.0 (L) 04/18/2022    HCT 20.7 (L) 04/18/2022    MCV 88.5 04/18/2022     (H) 04/18/2022       BMP  No results for input(s): GLU, NA, K, CL, CO2, BUN, CREATININE, CALCIUM, MG in the last 24 hours.    Microbiology Results (last 7 days)     Procedure Component Value Units Date/Time    Blood culture (site 2) [[id number]] Collected: 04/10/22 1302    Order Status: Completed Specimen: Blood Updated: 04/16/22 0607     Culture, Blood No Growth at 5 days    Blood culture (site 1) [[id number]] Collected: 04/10/22 1302    Order Status: Completed Specimen: Blood Updated: 04/16/22 0607     Culture, Blood No Growth at 5 days          Radiology:  Reviewed and noted in plan where applicable- Please see chart for full radiology data.      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 New fever he patient has been hospitalized for several months, multiple decubitus wounds which are possible infections doses, could be bacteremic as well having dialysis catheter and midline.  2. Multiple decubitus ulcers, infected in some areas  3. Respiratory failure on the vent      PLAN:  1. Blood cultures x2 sets ordered  2. Empirically start tobramycin, vancomycin, and Zosyn  3. Depending on cultures we can adjust antibiotic therapy.  If blood cultures come back negative, I would treat with antibiotics for 7-10 days depending on how he does  clinically.    Thank you very much for the consult.  Discussed with Dr. Abernathy.    I will be out until 5/3.

## 2022-04-21 NOTE — PROGRESS NOTES
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 male with wounds to sacral, left lateral lower leg, right anterior foot, right hand, left posterior heel, and right lateral and medial foot. Wound on left heel has exposed bone, right lower leg has exposed tendon, and sacral is malodorous with necrotic tissue. Bedside debridement today.     Review of Systems   Unable to perform ROS: Acuity of condition     Objective:     Vital Signs (Most Recent):  Temp: 99.8 °F (37.7 °C) (04/20/22 1838)  Pulse: (!) 112 (04/20/22 1947)  Resp: (!) 28 (04/20/22 1947)  BP: 119/61 (04/20/22 1820)  SpO2: 100 % (04/20/22 1947) Vital Signs (24h Range):  Temp:  [98.8 °F (37.1 °C)-102.3 °F (39.1 °C)] 99.8 °F (37.7 °C)  Pulse:  [106-116] 112  Resp:  [12-38] 28  SpO2:  [97 %-100 %] 100 %  BP: (110-151)/(54-75) 119/61     Weight: 78.8 kg (173 lb 11.6 oz)  Body mass index is 24.23 kg/m².  No data recorded    Physical Exam  Vitals reviewed.   Constitutional:       Appearance: Normal appearance.   HENT:      Head: Normocephalic.   Cardiovascular:      Rate and Rhythm: Regular rhythm. Tachycardia present.   Pulmonary:      Effort: Pulmonary effort is normal.   Musculoskeletal:         General: Tenderness present.   Skin:     Findings: Erythema present.      Comments: Wound, see LDA for measurements and pictures   Neurological:      Mental Status: He is alert. Mental status is at baseline.      Motor: Weakness present.            Altered Skin Integrity 01/20/22 1000 Right anterior Foot Purple or maroon localized area  of discolored intact skin or non-intact skin or a blood-filled blister. (Active)   01/20/22 1000   Altered Skin Integrity Present on Admission: suspected hospital acquired   Side: Right   Orientation: anterior   Location: Foot   Wound Number:    Is this injury device related?:    Primary Wound Type:    Description of Altered Skin Integrity: Purple or maroon localized area of discolored intact skin or non-intact skin or a blood-filled blister.   Removal Indication and Assessment:    Wound Outcome:    (Retired) Wound Length (cm):    (Retired) Wound Width (cm):    (Retired) Depth (cm):    Wound Description (Comments):    Removal Indications:    Wound Image    04/12/22 1020   Description of Altered Skin Integrity Full thickness tissue loss. Base is covered by slough and/or eschar in the wound bed 04/18/22 0945   Dressing Appearance Dry;Intact;Clean 04/20/22 0720   Drainage Amount None 04/20/22 0720   Drainage Characteristics/Odor Serous;Tan;No odor 04/18/22 0945   Appearance Tan;Fibrin;Moist;Necrotic 04/18/22 0945   Tissue loss description Not applicable 04/18/22 0945   Periwound Area Moist 04/18/22 0945   Wound Edges Defined;Open 04/18/22 0945   Wound Length (cm) 2 cm 04/12/22 1020   Wound Width (cm) 1.5 cm 04/12/22 1020   Wound Depth (cm) 0 cm 04/12/22 1020   Wound Volume (cm^3) 0 cm^3 04/12/22 1020   Wound Surface Area (cm^2) 3 cm^2 04/12/22 1020   Care Cleansed with:;Soap and water;Wound cleanser;Applied:;Removed:;Skin Barrier;Moisturizing agent 04/18/22 0945   Dressing Removed;Applied;Changed;Gauze, wet to moist;Silver;Gauze;Other (comment) 04/18/22 0945   Dressing Change Due 04/19/22 04/18/22 0945            Altered Skin Integrity 01/20/22 1000 Right lateral Foot Purple or maroon localized area of discolored intact skin or non-intact skin or a blood-filled blister. (Active)   01/20/22 1000   Altered Skin Integrity Present on Admission: suspected hospital acquired   Side: Right   Orientation: lateral   Location:  Foot   Wound Number:    Is this injury device related?:    Primary Wound Type:    Description of Altered Skin Integrity: Purple or maroon localized area of discolored intact skin or non-intact skin or a blood-filled blister.   Removal Indication and Assessment:    Wound Outcome:    (Retired) Wound Length (cm):    (Retired) Wound Width (cm):    (Retired) Depth (cm):    Wound Description (Comments):    Removal Indications:    Wound Image    04/14/22 1145   Description of Altered Skin Integrity Full thickness tissue loss. Base is covered by slough and/or eschar in the wound bed 04/18/22 0945   Dressing Appearance Moist drainage 04/18/22 0945   Drainage Amount Scant 04/18/22 0945   Drainage Characteristics/Odor Chung;Serous;No odor 04/18/22 0945   Appearance Tan;Slough;Moist 04/18/22 0945   Tissue loss description Not applicable 04/18/22 0945   Black (%), Wound Tissue Color 10 % 04/14/22 1145   Red (%), Wound Tissue Color 60 % 04/14/22 1145   Yellow (%), Wound Tissue Color 30 % 04/14/22 1145   Periwound Area Moist 04/18/22 0945   Wound Edges Defined;Open 04/18/22 0945   Wound Length (cm) 3.5 cm 04/14/22 1145   Wound Width (cm) 5 cm 04/14/22 1145   Wound Depth (cm) 0.3 cm 04/14/22 1145   Wound Volume (cm^3) 5.25 cm^3 04/14/22 1145   Wound Surface Area (cm^2) 17.5 cm^2 04/14/22 1145   Care Cleansed with:;Soap and water 04/20/22 1010   Dressing Applied;Silver 04/20/22 1010   Periwound Care Moisture barrier applied;Skin barrier film applied 04/18/22 0945   Dressing Change Due 04/19/22 04/18/22 0945            Altered Skin Integrity 02/08/22 1300 Left posterior Heel Other (comment) Full thickness tissue loss. Base is covered by slough and/or eschar in the wound bed (Active)   02/08/22 1300   Altered Skin Integrity Present on Admission: yes   Side: Left   Orientation: posterior   Location: Heel   Wound Number:    Is this injury device related?: No   Primary Wound Type: Other   Description of Altered Skin Integrity: Full  thickness tissue loss. Base is covered by slough and/or eschar in the wound bed   Removal Indication and Assessment:    Wound Outcome:    (Retired) Wound Length (cm):    (Retired) Wound Width (cm):    (Retired) Depth (cm):    Wound Description (Comments):    Removal Indications:    Wound Image    04/14/22 1145   Description of Altered Skin Integrity Full thickness tissue loss with exposed bone, tendon, or muscle. Often includes undermining and tunneling. May extend into muscle and/or supporting structures. 04/18/22 0945   Dressing Appearance Clean;Intact;Dry 04/20/22 0720   Drainage Amount None 04/20/22 0720   Drainage Characteristics/Odor Serous 04/20/22 0720   Appearance Tan;Slough;Moist 04/18/22 0945   Tissue loss description Full thickness 04/18/22 0945   Black (%), Wound Tissue Color 0 % 04/14/22 1145   Red (%), Wound Tissue Color 50 % 04/14/22 1145   Yellow (%), Wound Tissue Color 50 % 04/14/22 1145   Periwound Area Moist 04/18/22 0945   Wound Edges Defined;Open 04/18/22 0945   Wound Length (cm) 5.5 cm 04/14/22 1145   Wound Width (cm) 6 cm 04/14/22 1145   Wound Depth (cm) 0.1 cm 04/14/22 1145   Wound Volume (cm^3) 3.3 cm^3 04/14/22 1145   Wound Surface Area (cm^2) 33 cm^2 04/14/22 1145   Care Cleansed with:;Soap and water 04/20/22 1010   Dressing Applied;Silver 04/20/22 1010   Periwound Care Moisture barrier applied 04/18/22 0945   Dressing Change Due 04/19/22 04/18/22 0945            Altered Skin Integrity 02/15/22 1300 Left lateral Leg Traumatic Partial thickness tissue loss. Shallow open ulcer with a red or pink wound bed, without slough. Intact or Open/Ruptured Serum-filled blister. (Active)   02/15/22 1300   Altered Skin Integrity Present on Admission: suspected hospital acquired   Side: Left   Orientation: lateral   Location: Leg   Wound Number:    Is this injury device related?: No   Primary Wound Type: Traumatic   Description of Altered Skin Integrity: Partial thickness tissue loss. Shallow open ulcer  with a red or pink wound bed, without slough. Intact or Open/Ruptured Serum-filled blister.   Removal Indication and Assessment:    Wound Outcome:    (Retired) Wound Length (cm):    (Retired) Wound Width (cm):    (Retired) Depth (cm):    Wound Description (Comments):    Removal Indications:    Wound Image    04/12/22 1020   Description of Altered Skin Integrity Purple or maroon localized area of discolored intact skin or non-intact skin or a blood-filled blister. 03/28/22 1145   Dressing Appearance Open to air 04/16/22 0730   Drainage Amount None 04/16/22 0730   Appearance Dry 04/12/22 1020   Tissue loss description Not applicable 04/12/22 1020   Black (%), Wound Tissue Color 10 % 04/12/22 1020   Red (%), Wound Tissue Color 0 % 04/12/22 1020   Yellow (%), Wound Tissue Color 0 % 04/12/22 1020   Periwound Area Dry 04/12/22 1020   Wound Edges Defined 04/12/22 1020   Care Cleansed with:;Soap and water;Moisturizing agent 04/17/22 0800   Dressing Removed;Changed 04/10/22 1600            Altered Skin Integrity 02/15/22 1300 Right anterior Foot Traumatic Partial thickness tissue loss. Shallow open ulcer with a red or pink wound bed, without slough. Intact or Open/Ruptured Serum-filled blister. (Active)   02/15/22 1300   Altered Skin Integrity Present on Admission: suspected hospital acquired   Side: Right   Orientation: anterior   Location: Foot   Wound Number:    Is this injury device related?: No   Primary Wound Type: Traumatic   Description of Altered Skin Integrity: Partial thickness tissue loss. Shallow open ulcer with a red or pink wound bed, without slough. Intact or Open/Ruptured Serum-filled blister.   Removal Indication and Assessment:    Wound Outcome:    (Retired) Wound Length (cm):    (Retired) Wound Width (cm):    (Retired) Depth (cm):    Wound Description (Comments):    Removal Indications:    Wound Image    04/12/22 1020   Description of Altered Skin Integrity Purple or maroon localized area of discolored  intact skin or non-intact skin or a blood-filled blister. 03/15/22 1000   Dressing Appearance Dry;Intact 04/20/22 0720   Drainage Amount None 04/20/22 0720   Drainage Characteristics/Odor Serosanguineous 02/26/22 1600   Appearance Dressing in place, unable to visualize 04/14/22 0738   Tissue loss description Not applicable 04/12/22 1020   Black (%), Wound Tissue Color 99 % 03/15/22 1000   Red (%), Wound Tissue Color 99 % 02/15/22 1300   Periwound Area Dry 04/14/22 1145   Wound Edges Defined 04/14/22 1145   Care Cleansed with:;Soap and water 04/20/22 1010   Dressing Applied;Silver 04/20/22 1010   Dressing Change Due 02/28/22 02/24/22 1330            Altered Skin Integrity 02/28/22 1300 Left anterior Foot Traumatic Partial thickness tissue loss. Shallow open ulcer with a red or pink wound bed, without slough. Intact or Open/Ruptured Serum-filled blister. (Active)   02/28/22 1300   Altered Skin Integrity Present on Admission: suspected hospital acquired   Side: Left   Orientation: anterior   Location: Foot   Wound Number:    Is this injury device related?: No   Primary Wound Type: Traumatic   Description of Altered Skin Integrity: Partial thickness tissue loss. Shallow open ulcer with a red or pink wound bed, without slough. Intact or Open/Ruptured Serum-filled blister.   Removal Indication and Assessment:    Wound Outcome:    (Retired) Wound Length (cm):    (Retired) Wound Width (cm):    (Retired) Depth (cm):    Wound Description (Comments):    Removal Indications:    Wound Image    04/12/22 1020   Description of Altered Skin Integrity Full thickness tissue loss with exposed bone, tendon, or muscle. Often includes undermining and tunneling. May extend into muscle and/or supporting structures. 04/12/22 1020   Dressing Appearance Dry;Open to air 04/18/22 0945   Drainage Amount None 04/18/22 0945   Drainage Characteristics/Odor No odor 04/15/22 0930   Appearance Dry;Black 04/18/22 0945   Tissue loss description Not  applicable 04/15/22 0930   Black (%), Wound Tissue Color 100 % 04/14/22 1145   Red (%), Wound Tissue Color 0 % 04/14/22 1145   Yellow (%), Wound Tissue Color 0 % 04/14/22 1145   Periwound Area Dry 04/18/22 0945   Wound Edges Defined 04/18/22 0945   Care Cleansed with:;Soap and water 04/11/22 1000   Dressing Removed;Applied;Changed;Hydrocolloid 04/07/22 1130   Dressing Change Due 04/11/22 04/07/22 1130            Altered Skin Integrity 03/07/22 1300 Left anterior Greater trochanter Moisture associated dermatitis (Active)   03/07/22 1300   Altered Skin Integrity Present on Admission:    Side: Left   Orientation: anterior   Location: Greater trochanter   Wound Number:    Is this injury device related?: No   Primary Wound Type: Moisture ass   Description of Altered Skin Integrity:    Removal Indication and Assessment:    Wound Outcome:    (Retired) Wound Length (cm):    (Retired) Wound Width (cm):    (Retired) Depth (cm):    Wound Description (Comments):    Removal Indications:    Wound Image    03/17/22 1300   Dressing Appearance Open to air 04/16/22 0730   Drainage Amount None 04/16/22 0730   Appearance Intact 04/14/22 1145   Tissue loss description Not applicable 04/11/22 1000   Periwound Area Dry;Intact 04/11/22 1000   Wound Edges Defined 04/11/22 1000   Care Cleansed with:;Soap and water;Moisturizing agent 04/16/22 1500            Altered Skin Integrity 03/15/22 1000 Right lateral Malleolus Ulceration Purple or maroon localized area of discolored intact skin or non-intact skin or a blood-filled blister. (Active)   03/15/22 1000   Altered Skin Integrity Present on Admission: suspected hospital acquired   Side: Right   Orientation: lateral   Location: Malleolus   Wound Number:    Is this injury device related?: Yes   Primary Wound Type: Ulceration   Description of Altered Skin Integrity: Purple or maroon localized area of discolored intact skin or non-intact skin or a blood-filled blister.   Removal Indication and  Assessment:    Wound Outcome:    (Retired) Wound Length (cm):    (Retired) Wound Width (cm):    (Retired) Depth (cm):    Wound Description (Comments):    Removal Indications:    Wound Image    04/14/22 1145   Description of Altered Skin Integrity Full thickness tissue loss. Subcutaneous fat may be visible but bone, tendon or muscle are not exposed 04/18/22 0945   Dressing Appearance Dry;Intact;Clean 04/20/22 0720   Drainage Amount None 04/20/22 0720   Drainage Characteristics/Odor Serous;Tan;No odor 04/18/22 0945   Appearance Dressing in place, unable to visualize 04/20/22 0720   Tissue loss description Full thickness 04/18/22 0945   Black (%), Wound Tissue Color 0 % 04/14/22 1145   Red (%), Wound Tissue Color 60 % 04/14/22 1145   Yellow (%), Wound Tissue Color 40 % 04/14/22 1145   Periwound Area Moist 04/18/22 0945   Wound Edges Defined;Open 04/18/22 0945   Wound Length (cm) 3.5 cm 04/14/22 1145   Wound Width (cm) 3.5 cm 04/14/22 1145   Wound Depth (cm) 0.03 cm 04/14/22 1145   Wound Volume (cm^3) 0.3675 cm^3 04/14/22 1145   Wound Surface Area (cm^2) 12.25 cm^2 04/14/22 1145   Care Cleansed with:;Soap and water 04/20/22 1010   Dressing Applied;Silver 04/20/22 1010   Periwound Care Moisture barrier applied 04/18/22 0945   Dressing Change Due 04/19/22 04/18/22 0945            Altered Skin Integrity 03/15/22 1000 Right lateral Leg Ulceration Purple or maroon localized area of discolored intact skin or non-intact skin or a blood-filled blister. (Active)   03/15/22 1000   Altered Skin Integrity Present on Admission: suspected hospital acquired   Side: Right   Orientation: lateral   Location: Leg   Wound Number:    Is this injury device related?: No   Primary Wound Type: Ulceration   Description of Altered Skin Integrity: Purple or maroon localized area of discolored intact skin or non-intact skin or a blood-filled blister.   Removal Indication and Assessment:    Wound Outcome:    (Retired) Wound Length (cm):    (Retired)  Wound Width (cm):    (Retired) Depth (cm):    Wound Description (Comments):    Removal Indications:    Wound Image    04/14/22 1145   Description of Altered Skin Integrity Full thickness tissue loss with exposed bone, tendon, or muscle. Often includes undermining and tunneling. May extend into muscle and/or supporting structures. 04/18/22 0945   Dressing Appearance Dry;Intact;Clean 04/20/22 0720   Drainage Amount None 04/20/22 0720   Drainage Characteristics/Odor Serous;Tan;No odor 04/18/22 0945   Appearance Dressing in place, unable to visualize 04/20/22 0720   Tissue loss description Full thickness 04/18/22 0945   Black (%), Wound Tissue Color 0 % 04/14/22 1145   Red (%), Wound Tissue Color 50 % 04/14/22 1145   Yellow (%), Wound Tissue Color 50 % 04/14/22 1145   Periwound Area Moist 04/18/22 0945   Wound Edges Defined 04/18/22 0945   Wound Length (cm) 8.5 cm 04/14/22 1145   Wound Width (cm) 3.5 cm 04/14/22 1145   Wound Depth (cm) 0.5 cm 04/14/22 1145   Wound Volume (cm^3) 14.875 cm^3 04/14/22 1145   Wound Surface Area (cm^2) 29.75 cm^2 04/14/22 1145   Care Cleansed with:;Soap and water 04/20/22 1010   Dressing Applied;Silver 04/20/22 1010   Periwound Care Moisture barrier applied 04/18/22 0945   Dressing Change Due 04/19/22 04/18/22 0945            Altered Skin Integrity 03/22/22 1300 posterior Sacral spine Other (comment) Full thickness tissue loss. Base is covered by slough and/or eschar in the wound bed (Active)   03/22/22 1300   Altered Skin Integrity Present on Admission: yes   Side:    Orientation: posterior   Location: Sacral spine   Wound Number:    Is this injury device related?: No   Primary Wound Type: Other   Description of Altered Skin Integrity: Full thickness tissue loss. Base is covered by slough and/or eschar in the wound bed   Removal Indication and Assessment:    Wound Outcome:    (Retired) Wound Length (cm):    (Retired) Wound Width (cm):    (Retired) Depth (cm):    Wound Description  (Comments):    Removal Indications:    Wound Image    04/14/22 1145   Description of Altered Skin Integrity Full thickness tissue loss with exposed bone, tendon, or muscle. Often includes undermining and tunneling. May extend into muscle and/or supporting structures. 04/18/22 0945   Dressing Appearance Dry;Intact;Clean 04/20/22 0720   Drainage Amount None 04/20/22 0720   Drainage Characteristics/Odor Serous;Tan;No odor 04/18/22 0945   Appearance Pink;Tan;Necrotic;Moist;Bone 04/18/22 0945   Tissue loss description Full thickness 04/18/22 0945   Black (%), Wound Tissue Color 10 % 04/14/22 1145   Red (%), Wound Tissue Color 40 % 04/14/22 1145   Yellow (%), Wound Tissue Color 50 % 04/14/22 1145   Periwound Area Moist;Excoriated 04/18/22 0945   Wound Edges Defined;Open 04/18/22 0945   Wound Length (cm) 16 cm 04/14/22 1145   Wound Width (cm) 15 cm 04/14/22 1145   Wound Depth (cm) 4 cm 04/14/22 1145   Wound Volume (cm^3) 960 cm^3 04/14/22 1145   Wound Surface Area (cm^2) 240 cm^2 04/14/22 1145   Care Cleansed with:;Soap and water 04/20/22 1010   Dressing Applied;Silver 04/20/22 1010   Packing packed with;other (see comment) 04/16/22 1500   Periwound Care Moisture barrier applied 04/18/22 0945   Dressing Change Due 04/19/22 04/18/22 0945            Altered Skin Integrity 04/05/22 1130 Right anterior Toe, second Traumatic (Active)   04/05/22 1130   Altered Skin Integrity Present on Admission:    Side: Right   Orientation: anterior   Location: Toe, second   Wound Number:    Is this injury device related?: No   Primary Wound Type: Traumatic   Description of Altered Skin Integrity:    Removal Indication and Assessment:    Wound Outcome:    (Retired) Wound Length (cm):    (Retired) Wound Width (cm):    (Retired) Depth (cm):    Wound Description (Comments):    Removal Indications:    Wound Image    04/12/22 1020   Dressing Appearance Dry;Open to air 04/20/22 0720   Drainage Amount None 04/20/22 0720   Drainage  Characteristics/Odor Creamy 04/17/22 0800   Appearance Black 04/16/22 0730   Tissue loss description Not applicable 04/12/22 1020   Wound Edges Defined 04/12/22 1020   Care Cleansed with:;Soap and water 04/12/22 1020   Dressing Hydrocolloid 04/11/22 1000            Altered Skin Integrity 04/07/22 1130 posterior Buttocks Incontinence associated dermatitis (Active)   04/07/22 1130   Altered Skin Integrity Present on Admission: suspected hospital acquired   Side:    Orientation: posterior   Location: Buttocks   Wound Number:    Is this injury device related?: No   Primary Wound Type: Incontinence   Description of Altered Skin Integrity:    Removal Indication and Assessment:    Wound Outcome:    (Retired) Wound Length (cm):    (Retired) Wound Width (cm):    (Retired) Depth (cm):    Wound Description (Comments):    Removal Indications:    Wound Image    04/11/22 1000   Dressing Appearance Dry;Intact 04/16/22 0730   Drainage Amount None 04/16/22 0730   Drainage Characteristics/Odor Serosanguineous;No odor 04/11/22 1000   Appearance Moist;Pink 04/11/22 1000   Tissue loss description Partial thickness 04/11/22 1000   Black (%), Wound Tissue Color 0 % 04/11/22 1000   Red (%), Wound Tissue Color 80 % 04/11/22 1000   Yellow (%), Wound Tissue Color 0 % 04/11/22 1000   Periwound Area Denuded;Moist 04/11/22 1000   Wound Edges Undefined 04/11/22 1000   Care Cleansed with:;Soap and water;Applied:;Moisturizing agent 04/11/22 1000   Dressing Changed 04/11/22 0425            Incision/Site 01/04/22 1444 Neck (Active)   01/04/22 1444   Present Prior to Hospital Arrival?:    Side:    Location: Neck   Orientation:    Incision Type:    Closure Method:    Additional Comments:    Removal Indication and Assessment:    Wound Outcome:    Removal Indications:    Incision WDL WDL 04/16/22 0730   Dressing Appearance Open to air 04/16/22 0730   Drainage Amount None 04/16/22 0730       [REMOVED]      Negative Pressure Wound Therapy  12/14/21  midline;lower (Removed)   12/14/21    Side:    Orientation: midline;lower   Location: Lower quadrant   Additional Comments:    Removal Indication and Assessment: removed by previous caregiver   Location:    SDO Location:    Removed 02/16/22 0800   NPWT Type Vacuum Therapy 02/15/22 0600   Therapy Setting NPWT Vacuum off 02/15/22 1300   Pressure Setting NPWT 125 mmHg 02/15/22 0600   Therapy Interventions NPWT Seal intact 02/15/22 0600   Sponges Inserted NPWT Black 02/15/22 0600   Sponges Removed NPWT Black;White;1 02/10/22 1300   General Output (mL) 300 02/10/22 0930       [REMOVED]      Negative Pressure Wound Therapy  02/21/22 Right lower (Removed)   02/21/22    Side: Right   Orientation: lower   Location: Lower quadrant   Additional Comments:    Removal Indication and Assessment: No Longer Indicated   Location:    SDO Location:    Removed 02/22/22    NPWT Type Vacuum Therapy 02/22/22 0400   Therapy Setting NPWT Continuous therapy 02/22/22 0400   Pressure Setting NPWT other (see comments) 02/22/22 0400   Therapy Interventions NPWT Seal intact 02/22/22 0400   General Output (mL) 0 02/21/22 2340       [REMOVED]      Altered Skin Integrity Left lateral Thigh Purple or maroon localized area of discolored intact skin or non-intact skin or a blood-filled blister. (Removed)       Altered Skin Integrity Present on Admission:    Side: Left   Orientation: lateral   Location: Thigh   Wound Number:    Is this injury device related?:    Primary Wound Type:    Description of Altered Skin Integrity: Purple or maroon localized area of discolored intact skin or non-intact skin or a blood-filled blister.   Removal Indication and Assessment:    Wound Outcome: Healed   (Retired) Wound Length (cm):    (Retired) Wound Width (cm):    (Retired) Depth (cm):    Wound Description (Comments):    Removal Indications:    Removed 03/15/22 1000   Wound Image    03/15/22 1000   Description of Altered Skin Integrity Purple or maroon localized area  of discolored intact skin or non-intact skin or a blood-filled blister. 02/13/22 0730   Dressing Appearance Open to air;Dry;Intact 03/15/22 1000   Drainage Amount None 03/15/22 1000   Appearance Dry 03/15/22 1000   Care Cleansed with:;Soap and water 12/28/21 1521       [REMOVED]      Altered Skin Integrity 01/27/22 1045 Right dorsal Hand Other (comment) Full thickness tissue loss. Base is covered by slough and/or eschar in the wound bed (Removed)   01/27/22 1045   Altered Skin Integrity Present on Admission: suspected hospital acquired   Side: Right   Orientation: dorsal   Location: Hand   Wound Number:    Is this injury device related?: No   Primary Wound Type: Other   Description of Altered Skin Integrity: Full thickness tissue loss. Base is covered by slough and/or eschar in the wound bed   Removal Indication and Assessment:    Wound Outcome:    (Retired) Wound Length (cm):    (Retired) Wound Width (cm):    (Retired) Depth (cm):    Wound Description (Comments):    Removal Indications:    Removed 04/05/22 1130   Wound Image    04/05/22 1130   Description of Altered Skin Integrity Partial thickness tissue loss. Shallow open ulcer with a red or pink wound bed, without slough. Intact or Open/Ruptured Serum-filled blister. 02/28/22 1300   Dressing Appearance Open to air;Dry;Intact 03/21/22 1300   Drainage Amount None 03/21/22 1300   Drainage Characteristics/Odor Serosanguineous;No odor 03/01/22 1330   Appearance Pink;Dry 04/05/22 1130   Tissue loss description Not applicable 04/05/22 1130   Black (%), Wound Tissue Color 99 % 02/07/22 1300   Red (%), Wound Tissue Color 95 % 02/24/22 1330   Yellow (%), Wound Tissue Color 5 % 02/24/22 1330   Periwound Area Dry;Intact 03/21/22 1300   Wound Edges Defined 03/21/22 1300   Wound Length (cm) 3.5 cm 02/07/22 1300   Wound Width (cm) 2 cm 02/07/22 1300   Wound Depth (cm) 0 cm 02/07/22 1300   Wound Volume (cm^3) 0 cm^3 02/07/22 1300   Wound Surface Area (cm^2) 7 cm^2 02/07/22 1300    Care Cleansed with:;Soap and water 03/21/22 1300   Dressing Applied;Other (comment) 03/02/22 1300   Dressing Change Due 03/02/22 03/01/22 1330       [REMOVED]      Altered Skin Integrity 02/08/22 1300 Right lateral Malleolus Traumatic Partial thickness tissue loss. Shallow open ulcer with a red or pink wound bed, without slough. Intact or Open/Ruptured Serum-filled blister. (Removed)   02/08/22 1300   Altered Skin Integrity Present on Admission: suspected hospital acquired   Side: Right   Orientation: lateral   Location: Malleolus   Wound Number:    Is this injury device related?: No   Primary Wound Type: Traumatic   Description of Altered Skin Integrity: Partial thickness tissue loss. Shallow open ulcer with a red or pink wound bed, without slough. Intact or Open/Ruptured Serum-filled blister.   Removal Indication and Assessment: site change   Wound Outcome:    (Retired) Wound Length (cm):    (Retired) Wound Width (cm):    (Retired) Depth (cm):    Wound Description (Comments):    Removal Indications:    Removed 03/15/22 1000   Wound Image    03/07/22 1300   Description of Altered Skin Integrity Partial thickness tissue loss. Shallow open ulcer with a red or pink wound bed, without slough. Intact or Open/Ruptured Serum-filled blister. 03/07/22 1300   Dressing Appearance Dry 03/14/22 0945   Drainage Amount None 03/12/22 1621   Drainage Characteristics/Odor Brown;No odor 03/09/22 1250   Appearance Dressing in place, unable to visualize;Intact 03/13/22 2000   Tissue loss description Not applicable 03/09/22 1250   Periwound Area Dry 03/14/22 0945   Wound Edges Defined 03/14/22 0945   Care Cleansed with:;Soap and water 03/16/22 1100   Dressing Applied;Silver 03/16/22 1100   Dressing Change Due 03/11/22 03/10/22 1100       [REMOVED]      Altered Skin Integrity 02/08/22 1300 Right posterior Achilles Traumatic Purple or maroon localized area of discolored intact skin or non-intact skin or a blood-filled blister. (Removed)    02/08/22 1300   Altered Skin Integrity Present on Admission: suspected hospital acquired   Side: Right   Orientation: posterior   Location: Achilles   Wound Number:    Is this injury device related?: No   Primary Wound Type: Traumatic   Description of Altered Skin Integrity: Purple or maroon localized area of discolored intact skin or non-intact skin or a blood-filled blister.   Removal Indication and Assessment:    Wound Outcome: Healed   (Retired) Wound Length (cm):    (Retired) Wound Width (cm):    (Retired) Depth (cm):    Wound Description (Comments):    Removal Indications:    Removed 03/15/22 1000   Wound Image    03/15/22 1000   Description of Altered Skin Integrity Purple or maroon localized area of discolored intact skin or non-intact skin or a blood-filled blister. 03/07/22 1300   Dressing Appearance Dry;Open to air;Intact 03/15/22 1000   Drainage Amount None 03/15/22 1000   Appearance Dressing in place, unable to visualize;Intact 03/13/22 2000   Tissue loss description Not applicable 03/15/22 1000   Wound Edges Defined 03/15/22 1000   Care Cleansed with:;Soap and water 03/15/22 1000   Dressing Changed 02/21/22 2340       [REMOVED]      Altered Skin Integrity 03/07/22 1300 Right anterior Toe, fourth Ulceration Partial thickness tissue loss. Shallow open ulcer with a red or pink wound bed, without slough. Intact or Open/Ruptured Serum-filled blister. (Removed)   03/07/22 1300   Altered Skin Integrity Present on Admission: yes   Side: Right   Orientation: anterior   Location: Toe, fourth   Wound Number:    Is this injury device related?: No   Primary Wound Type: Ulceration   Description of Altered Skin Integrity: Partial thickness tissue loss. Shallow open ulcer with a red or pink wound bed, without slough. Intact or Open/Ruptured Serum-filled blister.   Removal Indication and Assessment:    Wound Outcome:    (Retired) Wound Length (cm):    (Retired) Wound Width (cm):    (Retired) Depth (cm):    Wound  Description (Comments):    Removal Indications:    Removed 04/13/22 1030   Wound Image    04/05/22 1130   Description of Altered Skin Integrity Full thickness tissue loss. Base is covered by slough and/or eschar in the wound bed 04/05/22 1130   Dressing Appearance Dry;Intact 04/12/22 0715   Drainage Amount None 04/12/22 0715   Drainage Characteristics/Odor No odor 04/11/22 1000   Appearance Black;Dry 04/11/22 1000   Tissue loss description Not applicable 04/11/22 1000   Black (%), Wound Tissue Color 100 % 04/05/22 1130   Red (%), Wound Tissue Color 0 % 04/05/22 1130   Yellow (%), Wound Tissue Color 0 % 04/05/22 1130   Periwound Area Dry 04/11/22 1000   Wound Edges Defined 04/11/22 1000   Care Cleansed with:;Soap and water 04/11/22 1000   Dressing Removed;Applied;Changed;Hydrocolloid 04/07/22 1145   Dressing Change Due 04/11/22 04/07/22 1145       [REMOVED]      Wound 12/27/21 1100 Traumatic Left anterior Knee (Removed)   12/27/21 1100    Pre-existing: Yes   Primary Wound Type: Traumatic   Side: Left   Orientation: anterior   Location: Knee   Wound Number:    Ankle-Brachial Index:    Pulses:    Removal Indication and Assessment:    Wound Outcome: Healed   (Retired) Wound Type:    (Retired) Wound Length (cm):    (Retired) Wound Width (cm):    (Retired) Depth (cm):    Wound Description (Comments):    Removal Indications:    Removed 02/28/22 1300   Wound Image    02/24/22 1330   Wound WDL WDL 02/28/22 1300   Dressing Appearance Open to air;Dry;Intact 02/28/22 1300   Drainage Amount None 02/28/22 1300   Appearance Intact 02/28/22 1300   Care Cleansed with:;Soap and water 02/07/22 1300       [REMOVED]      Incision/Site 12/13/21 2317 Perineum (Removed)   12/13/21 2317   Present Prior to Hospital Arrival?:    Side:    Location: Perineum   Orientation:    Incision Type:    Closure Method: Other (see comments)   Additional Comments:    Removal Indication and Assessment: site change   Wound Outcome:    Removal Indications:     Removed 03/22/22 1300   Wound Image    03/15/22 1000   Incision WDL WDL 03/21/22 1300   Dressing Appearance Moist drainage 03/21/22 1300   Drainage Amount Small 03/21/22 1300   Drainage Characteristics/Odor Serosanguineous;No odor 03/21/22 1300   Appearance Dressing in place, unable to visualize;Intact 03/23/22 0000   Black (%), Wound Tissue Color 0 % 03/15/22 1000   Red (%), Wound Tissue Color 99 % 03/15/22 1000   Yellow (%), Wound Tissue Color 0 % 03/15/22 1000   Periwound Area Moist 03/21/22 1300   Wound Edges Defined;Open 03/21/22 1300   Wound Length (cm) 7.5 cm 03/15/22 1000   Wound Width (cm) 11 cm 03/15/22 1000   Wound Depth (cm) 2 cm 03/15/22 1000   Wound Volume (cm^3) 165 cm^3 03/15/22 1000   Wound Surface Area (cm^2) 82.5 cm^2 03/15/22 1000   Care Cleansed with:;Soap and water;Wound cleanser;Applied:;Removed:;Skin Barrier;Moisturizing agent 03/21/22 1300   Dressing Removed;Applied;Changed;Gauze, wet to moist;Gauze;Other (comment) 03/21/22 1300   Packing packing removed 03/20/22 1550   Packing Inserted  1 01/23/22 1800   Packing Removed 1 02/05/22 0730   Periwound Care Moisture barrier applied;Skin barrier film applied 03/21/22 1300   Dressing Change Due 03/22/22 03/21/22 1300       [REMOVED]      Incision/Site 12/27/21 1100 Right Buttocks (Removed)   12/27/21 1100   Present Prior to Hospital Arrival?:    Side: Right   Location: Buttocks   Orientation:    Incision Type:    Closure Method:    Additional Comments:    Removal Indication and Assessment:    Wound Outcome:    Removal Indications:    Removed 12/27/21 1100   Incision WDL WDL 12/26/21 0730   Dressing Appearance Dry;Intact;Dried drainage 12/26/21 0730   Drainage Amount Scant 12/26/21 0730   Drainage Characteristics/Odor Brown 12/26/21 0730   Appearance Dressing in place, unable to visualize 12/27/21 2000   Care Cleansed with:;Antimicrobial agent;Soap and water;Applied:;Other (see comments) 12/26/21 0304   Dressing Removed;Changed;Gauze, wet to dry  12/26/21 0730   Dressing Change Due 12/27/21 12/26/21 1530       [REMOVED]      Incision/Site 12/14/21 1238 Abdomen (Removed)   12/14/21 1238   Present Prior to Hospital Arrival?:    Side:    Location: Abdomen   Orientation:    Incision Type:    Closure Method:    Additional Comments:    Removal Indication and Assessment: site change   Wound Outcome:    Removal Indications:    Removed 02/07/22 1330   Wound Image    12/27/21 1100   Incision WDL WDL 01/27/22 1045   Dressing Appearance Dry;Intact 01/22/22 0010   Drainage Amount Moderate 01/18/22 0730   Drainage Characteristics/Odor Brown 01/09/22 0715   Appearance Chung;Moist 12/29/21 1100   Periwound Area Moist 12/29/21 1100   Wound Edges Defined;Open 12/29/21 1100   Care Cleansed with:;Sterile normal saline;Applied:;Removed:;Skin Barrier 12/28/21 1330   Dressing Removed;Changed;Gauze, wet to dry 01/01/22 0550   Packing packing removed;packed with;gauze, iodoform 01/01/22 0550   Dressing Change Due 01/02/22 01/01/22 0550       [REMOVED]      Incision/Site 01/01/22 0936 Abdomen (Removed)   01/01/22 0936   Present Prior to Hospital Arrival?:    Side:    Location: Abdomen   Orientation:    Incision Type:    Closure Method:    Additional Comments:    Removal Indication and Assessment: site change   Wound Outcome:    Removal Indications:    Removed 02/07/22 1330   Incision WDL ex 01/23/22 0730   Dressing Appearance Intact;Clean 01/23/22 2010   Drainage Amount Moderate 01/23/22 0730   Drainage Characteristics/Odor Sanguineous 01/23/22 0730   Appearance Dressing in place, unable to visualize 01/23/22 0730   Dressing Foam 01/19/22 0730       [REMOVED]      Incision/Site 01/01/22 0956 Right Groin (Removed)   01/01/22 0956   Present Prior to Hospital Arrival?:    Side: Right   Location: Groin   Orientation:    Incision Type:    Closure Method:    Additional Comments:    Removal Indication and Assessment: site change   Wound Outcome:    Removal Indications:    Removed 02/07/22  1300   Incision WDL WDL 01/22/22 0745   Dressing Appearance Open to air 01/19/22 0730   Drainage Amount None 01/19/22 0730   Care Cleansed with:;Soap and water 01/19/22 0730   Dressing Applied 01/14/22 0800       [REMOVED]      Incision/Site 01/04/22 1444 Abdomen (Removed)   01/04/22 1444   Present Prior to Hospital Arrival?:    Side:    Location: Abdomen   Orientation:    Incision Type:    Closure Method:    Additional Comments:    Removal Indication and Assessment: site change   Wound Outcome:    Removal Indications:    Removed 02/07/22 1300   Incision WDL ex 01/19/22 0730   Dressing Appearance Dry;Intact 01/21/22 0730   Drainage Amount Moderate 01/21/22 0730   Drainage Characteristics/Odor Brown;Serosanguineous 01/21/22 0730   Appearance Intact 01/19/22 0730   Dressing Foam 01/19/22 0730       [REMOVED]      Incision/Site 01/04/22 1444 Buttocks (Removed)   01/04/22 1444   Present Prior to Hospital Arrival?:    Side:    Location: Buttocks   Orientation:    Incision Type:    Closure Method:    Additional Comments:    Removal Indication and Assessment: site change   Wound Outcome:    Removal Indications:    Removed 03/22/22 1300   Wound Image    03/15/22 1000   Incision WDL WDL 03/21/22 1300   Dressing Appearance Moist drainage 03/21/22 1300   Drainage Amount Small 03/21/22 1300   Drainage Characteristics/Odor Serosanguineous;Other (see comments) 03/21/22 1300   Appearance Dressing in place, unable to visualize;Intact 03/23/22 0000   Black (%), Wound Tissue Color 80 % 03/15/22 1000   Red (%), Wound Tissue Color 20 % 03/15/22 1000   Yellow (%), Wound Tissue Color 0 % 03/15/22 1000   Periwound Area Moist 03/21/22 1300   Wound Edges Defined;Open 03/21/22 1300   Wound Length (cm) 10 cm 03/15/22 1000   Wound Width (cm) 6 cm 03/15/22 1000   Wound Depth (cm) 0 cm 03/15/22 1000   Wound Volume (cm^3) 0 cm^3 03/15/22 1000   Wound Surface Area (cm^2) 60 cm^2 03/15/22 1000   Care Cleansed with:;Soap and water;Wound  cleanser;Applied:;Removed:;Skin Barrier;Moisturizing agent 03/21/22 1300   Dressing Removed;Applied;Changed;Gauze, wet to moist;Gauze;Other (comment) 03/21/22 1300   Packing packing removed 03/13/22 1300   Periwound Care Moisture barrier applied;Skin barrier film applied 03/21/22 1300   Dressing Change Due 03/22/22 03/21/22 1300       [REMOVED]      Incision/Site 01/07/22 1106 Abdomen (Removed)   01/07/22 1106   Present Prior to Hospital Arrival?:    Side:    Location: Abdomen   Orientation:    Incision Type:    Closure Method:    Additional Comments:    Removal Indication and Assessment: site change   Wound Outcome:    Removal Indications:    Removed 02/07/22 1300   Dressing Appearance Dry;Intact 02/05/22 0730   Drainage Amount None 02/05/22 0730   Drainage Characteristics/Odor Brown;Sanguineous 01/19/22 0730   Dressing Other (comment) 01/10/22 0720       [REMOVED]      Incision/Site 01/10/22 1518 Abdomen (Removed)   01/10/22 1518   Present Prior to Hospital Arrival?:    Side:    Location: Abdomen   Orientation:    Incision Type:    Closure Method:    Additional Comments:    Removal Indication and Assessment:    Wound Outcome: Healed   Removal Indications:    Removed 03/15/22 1000   Wound Image     03/15/22 1000   Incision WDL WDL 03/15/22 1000   Dressing Appearance Moist drainage 03/15/22 1000   Drainage Amount Scant 03/15/22 1000   Drainage Characteristics/Odor Serosanguineous;No odor 03/15/22 1000   Appearance Pink;Hypergranulation;Moist;Dry 03/15/22 1000   Red (%), Wound Tissue Color 30 % 03/15/22 1000   Periwound Area Dry;Moist 03/15/22 1000   Wound Edges Defined 03/15/22 1000   Care Cleansed with:;Soap and water;Applied:;Removed:;Skin Barrier;Moisturizing agent 03/15/22 1000   Dressing Removed;Applied;Changed;Gauze;Non-adherent;Other (comment) 03/15/22 1000   Dressing Change Due 03/18/22 03/15/22 1000       [REMOVED]      Incision/Site 01/12/22 1001 Abdomen (Removed)   01/12/22 1001   Present Prior to  Hospital Arrival?:    Side:    Location: Abdomen   Orientation:    Incision Type:    Closure Method:    Additional Comments:    Removal Indication and Assessment: site change   Wound Outcome:    Removal Indications:    Removed 02/07/22 1300   Dressing Appearance Intact;Dry;Clean 02/09/22 0730   Drainage Amount None 02/09/22 0730   Care Cleansed with:;Soap and water 01/14/22 1000   Dressing Applied;Other (comment) 01/14/22 1000       [REMOVED]      Incision/Site 02/21/22 1318 Right Leg (Removed)   02/21/22 1318   Present Prior to Hospital Arrival?:    Side: Right   Location: Leg   Orientation:    Incision Type:    Closure Method:    Additional Comments:    Removal Indication and Assessment: site change   Wound Outcome:    Removal Indications:    Removed 04/14/22 1145   Wound Image    04/05/22 1130   Incision WDL WDL 04/12/22 0715   Dressing Appearance Open to air 04/12/22 0715   Drainage Amount None 04/12/22 0715   Drainage Characteristics/Odor Serosanguineous;No odor 03/22/22 1300   Appearance Pink;Dry 04/11/22 1000   Black (%), Wound Tissue Color 0 % 04/05/22 1130   Red (%), Wound Tissue Color 100 % 04/05/22 1130   Yellow (%), Wound Tissue Color 0 % 04/05/22 1130   Periwound Area Dry 04/11/22 1000   Wound Edges Defined 04/11/22 1000   Care Soap and water;Cleansed with: 04/12/22 0715   Dressing Gauze;Other (comment) 03/08/22 1300   Dressing Change Due 02/28/22 02/24/22 1540       [REMOVED]      Incision/Site 02/21/22 1318 Abdomen (Removed)   02/21/22 1318   Present Prior to Hospital Arrival?:    Side:    Location: Abdomen   Orientation:    Incision Type:    Closure Method:    Additional Comments:    Removal Indication and Assessment: site change   Wound Outcome:    Removal Indications:    Removed 02/23/22 1330   Dressing Appearance Dry;Intact 02/23/22 0000   Appearance Dressing in place, unable to visualize 02/23/22 0000       [REMOVED]      Incision/Site 03/09/22 1444 Abdomen (Removed)   03/09/22 1444   Present  Prior to Hospital Arrival?:    Side:    Location: Abdomen   Orientation:    Incision Type:    Closure Method:    Additional Comments:    Removal Indication and Assessment:    Wound Outcome: Healed   Removal Indications:    Removed 04/14/22 1145   Wound Image    04/07/22 1415   Incision WDL WDL 04/12/22 0715   Dressing Appearance Open to air 04/12/22 0715   Drainage Amount None 04/12/22 0715   Drainage Characteristics/Odor Serosanguineous;No odor 03/22/22 1300   Appearance Dry;Intact 04/11/22 1000   Black (%), Wound Tissue Color 0 % 04/11/22 1000   Red (%), Wound Tissue Color 100 % 04/11/22 1000   Yellow (%), Wound Tissue Color 0 % 04/11/22 1000   Periwound Area Dry 04/11/22 1000   Wound Edges Defined 04/11/22 1000   Care Cleansed with:;Soap and water 04/12/22 0715   Dressing Removed;Applied;Changed;Non-adherent;Other (comment) 03/22/22 1300       [REMOVED]      Incision/Site 03/25/22 1008 Buttocks (Removed)   03/25/22 1008   Present Prior to Hospital Arrival?:    Side:    Location: Buttocks   Orientation:    Incision Type:    Closure Method:    Additional Comments:    Removal Indication and Assessment: site change   Wound Outcome:    Removal Indications:    Removed 04/14/22 1145   Incision WDL ex 04/13/22 0730   Dressing Appearance Dry;Intact 04/14/22 0738   Drainage Amount None 04/13/22 0730   Appearance Dressing in place, unable to visualize 04/14/22 2000   Care Cleansed with:;Antimicrobial agent;Wound cleanser 04/10/22 1600   Dressing Changed;Gauze, wet to dry 04/10/22 1600   Packing packed with;strip gauze 04/10/22 1600   Dressing Change Due 04/11/22 04/10/22 1600       [REMOVED]      Incision/Site 04/13/22 1052 Left Leg (Removed)   04/13/22 1052   Present Prior to Hospital Arrival?:    Side: Left   Location: Leg   Orientation:    Incision Type:    Closure Method:    Additional Comments:    Removal Indication and Assessment:    Wound Outcome:    Removal Indications:    Removed 04/14/22 1145   Appearance Dressing  in place, unable to visualize 04/14/22 2000       [REMOVED]      Incision/Site 04/13/22 1052 Right Leg (Removed)   04/13/22 1052   Present Prior to Hospital Arrival?:    Side: Right   Location: Leg   Orientation:    Incision Type:    Closure Method:    Additional Comments:    Removal Indication and Assessment: site change   Wound Outcome:    Removal Indications:    Removed 04/14/22 1145   Appearance Dressing in place, unable to visualize 04/14/22 2000       [REMOVED]      Incision/Site 04/13/22 1052 Sacral Spine (Removed)   04/13/22 1052   Present Prior to Hospital Arrival?:    Side:    Location: Sacral Spine   Orientation:    Incision Type:    Closure Method:    Additional Comments:    Removal Indication and Assessment: site change   Wound Outcome:    Removal Indications:    Removed 04/14/22 1145   Dressing Appearance Dry;Clean;Intact 04/14/22 0738   Appearance Dressing in place, unable to visualize 04/14/22 2000       Assessment and Plan      Wound Assessment:  1. Sacral wound - Debridement today  2.  Lower extremity wounds     Wound Care Plan:   1. Clean with vashe.   2. Apply silvadene to all wounds  3. Cover and secure dry dressing.   4. Change daily and PRN for soilage.   5. Turn every 2 hours  6. Low air loss mattress  7. TAP system  8. Keep pressure off wounds          Signature:  HERSON Viveros  Wound Care    Date of encounter: 04/20/2022

## 2022-04-21 NOTE — NURSING
Awake at bedrest does not follow any commands .. total care ongoing  Safety measures in effect. drsgs to austyn feet and sacral area intact .. pt has trach and on ventilator no distress noted at this time

## 2022-04-21 NOTE — ASSESSMENT & PLAN NOTE
- s/p multiple debridements  - 2/21 back to OR for skin grafting  - 3/9 had peg tube placed     Wound culture reviewed - with pt spiking a fever will ask ID for assistance on antibiotic recommendations

## 2022-04-21 NOTE — PROGRESS NOTES
Pharmacokinetic Initial Assessment: Tobramycin    Assessment:  Weight utilized for dose calculation: Actual Body Weight  Dosing method utilized: Dosing by random level    Plan: Tobramycin 240 mg IV x 1.  A random tobra level has been ordered for 4/25 at 0600.    Pharmacy will continue to monitor.    Please contact pharmacy at extension [phone] with any questions regarding this assessment.    Patient brief summary:  Og Garcia is a 66 y.o. male initiated on aminoglycoside therapy for treatment of suspected skin & soft tissue infection    Drug Allergies:   Review of patient's allergies indicates:  No Known Allergies    Actual Body Weight:   76.6 kg    Adjust Body Weight:   75.8 kg    Ideal Body Weight:  75.3 kg    Renal Function:   CrCl cannot be calculated (Patient's most recent lab result is older than the maximum 7 days allowed.).,     Dialysis Method (if applicable):  intermittent HD    CBC (last 72 hours):  No results for input(s): WHITE BLOOD CELL COUNT, HEMOGLOBIN, HEMATOCRIT, PLATELETS, GRAN%, LYMPH%, MONO%, EOSINOPHIL%, BASOPHIL%, DIFFERENTIAL METHOD in the last 72 hours.    Metabolic Panel (last 72 hours):  No results for input(s): SODIUM, POTASSIUM, CHLORIDE, CO2, GLUCOSE, BUN BLD, CREATININE, ALBUMIN, BILIRUBIN TOTAL, ALK PHOS, AST, ALT, MAGNESIUM, PHOSPHORUS in the last 72 hours.    Microbiologic Results:  Microbiology Results (last 7 days)       Procedure Component Value Units Date/Time    Blood culture [[id number]]     Order Status: Sent Specimen: Blood     Blood culture [[id number]]     Order Status: Sent Specimen: Blood     Blood culture (site 2) [[id number]] Collected: 04/10/22 1302    Order Status: Completed Specimen: Blood Updated: 04/16/22 0607     Culture, Blood No Growth at 5 days    Blood culture (site 1) [[id number]] Collected: 04/10/22 1302    Order Status: Completed Specimen: Blood Updated: 04/16/22 0607     Culture, Blood No Growth at 5 days

## 2022-04-21 NOTE — ASSESSMENT & PLAN NOTE
No active bleeding  Hgb 6.0 on 4/11  Patient transfused 1 unit with dialysis yesterday  4/13 -H/H 7.5/22.7  4/15- hgb is 6.2 today. We are going to give another unit of PRBC's today with dialysis  4/16 post transfusion cbc is pending  4/18- hgb is 6.6. Will plan for transfusion with 1 unit of prbc's with next HD  4/21 - 7.0/20.7

## 2022-04-21 NOTE — SUBJECTIVE & OBJECTIVE
Interval History: No complaints. Tolerating CPAP. Tmax 102.3     Objective:     Vital Signs (Most Recent):  Temp: 99.8 °F (37.7 °C) (04/21/22 0300)  Pulse: 109 (04/21/22 0820)  Resp: (!) 21 (04/21/22 0820)  BP: (!) 111/55 (04/21/22 0600)  SpO2: 100 % (04/21/22 0820)   Vital Signs (24h Range):  Temp:  [98 °F (36.7 °C)-101 °F (38.3 °C)] 99.8 °F (37.7 °C)  Pulse:  [108-117] 109  Resp:  [12-39] 21  SpO2:  [83 %-100 %] 100 %  BP: (100-150)/(48-79) 111/55     Weight: 76.6 kg (168 lb 14 oz)  Body mass index is 23.55 kg/m².      Intake/Output Summary (Last 24 hours) at 4/21/2022 1218  Last data filed at 4/21/2022 0800  Gross per 24 hour   Intake 2400 ml   Output 2700 ml   Net -300 ml       Physical Exam  Vitals reviewed.   Constitutional:       General: He is not in acute distress.     Comments: Chronically ill appearing   HENT:      Right Ear: External ear normal.      Left Ear: External ear normal.      Mouth/Throat:      Mouth: Mucous membranes are moist.   Eyes:      Pupils: Pupils are equal, round, and reactive to light.   Cardiovascular:      Rate and Rhythm: Normal rate and regular rhythm.   Pulmonary:      Breath sounds: Normal breath sounds. No wheezing, rhonchi or rales.   Abdominal:      Palpations: Abdomen is soft.   Musculoskeletal:         General: Normal range of motion.      Cervical back: Normal range of motion and neck supple.   Skin:     General: Skin is warm and dry.      Capillary Refill: Capillary refill takes less than 2 seconds.   Neurological:      Mental Status: He is alert. Mental status is at baseline.       Vents:  Vent Mode: A/C (04/21/22 0402)  Ventilator Initiated: Yes (04/10/22 0343)  Set Rate: 14 BPM (04/21/22 0402)  Vt Set: 480 mL (04/21/22 0402)  Pressure Support: 10 cmH20 (04/20/22 1205)  PEEP/CPAP: 5 cmH20 (04/21/22 0402)  Oxygen Concentration (%): 35 (04/21/22 0402)  Peak Airway Pressure: 16 cmH2O (04/21/22 0402)  Plateau Pressure: 20 cmH20 (03/07/22 0500)  Total Ve: 7.4 mL (04/21/22  0402)  F/VT Ratio<105 (RSBI): (!) 50.28 (04/21/22 0402)    Lines/Drains/Airways       Central Venous Catheter Line  Duration                  Hemodialysis Catheter 12/30/21 0900 right internal jugular 112 days              Drain  Duration                  Colostomy 12/18/21 1030 Descending/sigmoid  days         Gastrostomy/Enterostomy 03/09/22 1436 Percutaneous endoscopic gastrostomy (PEG) midline feeding 42 days              Airway  Duration                  Surgical Airway 01/04/22 Shiley 107 days              Peripheral Intravenous Line  Duration                  Peripheral IV - Single Lumen 04/11/22 1623 18 G Anterior;Proximal;Right Forearm 9 days                    Significant Labs:    CBC/Anemia Profile:  No results for input(s): WBC, HGB, HCT, PLT, MCV, RDW, IRON, FERRITIN, RETIC, FOLATE, [id number], OCCULTBLOOD in the last 48 hours.     Chemistries:  No results for input(s): NA, K, CL, CO2, BUN, CREATININE, CALCIUM, ALBUMIN, PROT, BILITOT, ALKPHOS, ALT, AST, GLUCOSE, MG, PHOS in the last 48 hours.    All pertinent labs within the past 24 hours have been reviewed.    Significant Imaging:  I have reviewed all pertinent imaging results/findings within the past 24 hours.

## 2022-04-21 NOTE — ASSESSMENT & PLAN NOTE
2/28 - resp culture with pseudomonas and klebsiella  treat with zosyn for 7 days   Blood culture from 2/25 also growing Pseudomonas. Sensitive to zosyn. Will need to continue zosyn for at least 10 days total  - low grade fever since 3/28, CXR- unchanged and repeat CBC- WBC 20.57  - removed malpositioned LUE PICC.  - blood cultures- NGTD after 72  Hours   -4/6-  Temp 101 at noon yesterday- wound cultures with heavy growth gram neg bacilli - will go ahead and add Rocephin and continue to follow cultures    4/7- febrile this morning - added clinda given his aspiration event   4/8 - afebrile- would cultures reviewed - sensitive to Rocephin   4/9-  wound culture sensitivity has returned; escalating to Zosyn specially given his fever overnight.  4/10--- febrile - TMAx 103.1 - will add MRSA coverage - pan culture   4/21- Tmax 102.3 - ID consulted

## 2022-04-21 NOTE — PLAN OF CARE
Per IDT meeting continue current plan of care. SS will continue to follow for discharge needs when pt is medically ready.

## 2022-04-21 NOTE — PROCEDURES
"Og Garcia is a 66 y.o. male patient.    Vital Signs (Most Recent):  Temp: 98.4 °F (36.9 °C) (04/19/22 0800)  Pulse: 106 (04/19/22 1209)  Resp: (!) 22 (04/19/22 1209)  BP: (!) 141/73 (04/19/22 1340)  SpO2: 100 % (04/19/22 1209)  Weight: 78.3 kg (172 lb 9.9 oz)  Body mass index is 24.08 kg/m²    Debridement    Date/Time: 4/20/2022 7:45 PM  Performed by: HERSON Viveros  Authorized by: HERSON Viveros     Time out: Immediately prior to procedure a "time out" was called to verify the correct patient, procedure, equipment, support staff and site/side marked as required.    Consent Done?:  Yes (Written)  Local anesthesia used?: No      Wound Details:    Location:  S[place]    Type of Debridement:  Excisional       Length (cm):  16       Area (sq cm):  240       Width (cm):  15       Percent Debrided (%):  100       Depth (cm):  4       Total Area Debrided (sq cm):  240    Depth of debridement:  Muscle/fascia/tendon    Tissue debrided:  Muscle and Subcutaneous    Devitalized tissue debrided:  Exudate, Clots, Necrotic/Eschar and Slough    Instruments:  Scissors    Bleeding:  Moderate  Hemostasis Achieved: Yes    Method Used:  Silver Nitrate and Other  Patient tolerance:  Patient tolerated the procedure well with no immediate complications        04/19/2022  "

## 2022-04-21 NOTE — PROGRESS NOTES
Pharmacokinetic Initial Assessment: IV Vancomycin    Assessment/Plan:    This is a dialysis pt who last received a dose of vanc on 4/18 at 2155.  Pharmacy has ordered a random vanc level for 4/22 at 0600.  If his level is < 20 at that time, pharmacy will re-order vanc.  Desired empiric serum trough concentration is < 20  Draw vancomycin random level on 4/22 at 0600.  Pharmacy will continue to follow and monitor vancomycin.      Please contact pharmacy at extension [phone] with any questions regarding this assessment.     Patient brief summary:  Og Garcia is a 66 y.o. male initiated on antimicrobial therapy with IV Vancomycin for treatment of suspected skin & soft tissue infection    Drug Allergies:   Review of patient's allergies indicates:  No Known Allergies    Actual Body Weight:   76.6 kg    Renal Function:   CrCl cannot be calculated (Patient's most recent lab result is older than the maximum 7 days allowed.).,     Dialysis Method (if applicable):  Intermittent HD    CBC (last 72 hours):  No results for input(s): WHITE BLOOD CELL COUNT, HEMOGLOBIN, HEMATOCRIT, PLATELETS, GRAN%, LYMPH%, MONO%, EOSINOPHIL%, BASOPHIL%, DIFFERENTIAL METHOD in the last 72 hours.    Metabolic Panel (last 72 hours):  No results for input(s): SODIUM, POTASSIUM, CHLORIDE, CO2, GLUCOSE, BUN BLD, CREATININE, ALBUMIN, BILIRUBIN TOTAL, ALK PHOS, AST, ALT, MAGNESIUM, PHOSPHORUS in the last 72 hours.    Drug levels (last 3 results):  No results for input(s): VANCOMYCINRA, VANCOMYCINPE, VANCOMYCINTR in the last 72 hours.    Microbiologic Results:  Microbiology Results (last 7 days)       Procedure Component Value Units Date/Time    Blood culture [[id number]]     Order Status: Sent Specimen: Blood     Blood culture [[id number]]     Order Status: Sent Specimen: Blood     Blood culture (site 2) [[id number]] Collected: 04/10/22 1302    Order Status: Completed Specimen: Blood Updated: 04/16/22 0607     Culture, Blood No Growth at 5 days    Blood  culture (site 1) [[id number]] Collected: 04/10/22 1302    Order Status: Completed Specimen: Blood Updated: 04/16/22 0607     Culture, Blood No Growth at 5 days

## 2022-04-22 PROBLEM — I95.9 HYPOTENSION: Status: RESOLVED | Noted: 2022-01-01 | Resolved: 2022-01-01

## 2022-04-22 NOTE — PROGRESS NOTES
Pharmacokinetic Assessment Follow Up: IV Vancomycin    Vancomycin serum concentration assessment(s):    The random level was drawn correctly and can be used to guide therapy at this time. The measurement is within the desired definitive target range of < 20.    Vancomycin Regimen Plan:    Vanc 1500 mg IV x 1 today.  A random vanc trough has been ordered for 4/27 at 0600.    Drug levels (last 3 results):  Recent Labs   Lab Result Units 04/22/22  0530   Vancomycin, Random µg/mL 14.8       Pharmacy will continue to follow and monitor vancomycin.    Please contact pharmacy at extension [phone] for questions regarding this assessment.    Patient brief summary:  Og Garcia is a 66 y.o. male initiated on antimicrobial therapy with IV Vancomycin for treatment of skin & soft tissue infection    Drug Allergies:   Review of patient's allergies indicates:  No Known Allergies    Actual Body Weight:   76.3 kg    Renal Function:   CrCl cannot be calculated (Patient's most recent lab result is older than the maximum 7 days allowed.).,     Dialysis Method (if applicable):  intermittent HD    CBC (last 72 hours):  Recent Labs   Lab Result Units 04/22/22  0255   WBC K/uL 19.32*   Hemoglobin g/dL 7.2*   Hematocrit % 21.6*   Platelet Count K/uL 462*   Lymphocytes % % 6.6*   Lymphocytes, Man % % 9*   Monocytes % % 7.5*   Monocytes, Man % % 4   Eosinophils % % 0.8*   Eosinophils, Man % % 1   Basophils % % 0.5       Metabolic Panel (last 72 hours):  No results for input(s): SODIUM, POTASSIUM, CHLORIDE, CO2, GLUCOSE, BUN BLD, CREATININE, ALBUMIN, BILIRUBIN TOTAL, ALK PHOS, AST, ALT, MAGNESIUM, PHOSPHORUS in the last 72 hours.    Vancomycin Administrations:  vancomycin given in the last 96 hours                     vancomycin (VANCOCIN) 1,500 mg in dextrose 5 % 250 mL IVPB (mg) 1,500 mg New Bag 04/18/22 2995                    Microbiologic Results:  Microbiology Results (last 7 days)       Procedure Component Value Units Date/Time     Blood culture [[id number]] Collected: 04/21/22 1302    Order Status: Sent Specimen: Blood Updated: 04/21/22 1327    Blood culture [[id number]] Collected: 04/21/22 1302    Order Status: Sent Specimen: Blood Updated: 04/21/22 1327    Blood culture (site 2) [[id number]] Collected: 04/10/22 1302    Order Status: Completed Specimen: Blood Updated: 04/16/22 0607     Culture, Blood No Growth at 5 days    Blood culture (site 1) [[id number]] Collected: 04/10/22 1302    Order Status: Completed Specimen: Blood Updated: 04/16/22 0607     Culture, Blood No Growth at 5 days

## 2022-04-22 NOTE — SUBJECTIVE & OBJECTIVE
Interval History: Seen on HD. No complaints. HR in 110's on telemonitor    Objective:     Vital Signs (Most Recent):  Temp: 98 °F (36.7 °C) (04/22/22 0855)  Pulse: (!) 116 (04/22/22 0930)  Resp: (!) 35 (04/22/22 0930)  BP: (!) 100/49 (04/22/22 0930)  SpO2: 100 % (04/22/22 0930)   Vital Signs (24h Range):  Temp:  [97.7 °F (36.5 °C)-100 °F (37.8 °C)] 98 °F (36.7 °C)  Pulse:  [] 116  Resp:  [13-39] 35  SpO2:  [97 %-100 %] 100 %  BP: ()/(44-74) 100/49     Weight: 76.3 kg (168 lb 3.4 oz)  Body mass index is 23.46 kg/m².      Intake/Output Summary (Last 24 hours) at 4/22/2022 0941  Last data filed at 4/21/2022 1910  Gross per 24 hour   Intake 1490 ml   Output 500 ml   Net 990 ml       Physical Exam  Vitals reviewed.   Constitutional:       General: He is not in acute distress.     Comments: Chronically ill appearing   HENT:      Right Ear: External ear normal.      Left Ear: External ear normal.      Mouth/Throat:      Mouth: Mucous membranes are moist.   Eyes:      Pupils: Pupils are equal, round, and reactive to light.   Cardiovascular:      Rate and Rhythm: Regular rhythm. Tachycardia present.   Pulmonary:      Breath sounds: Normal breath sounds. No wheezing, rhonchi or rales.   Abdominal:      Palpations: Abdomen is soft.   Musculoskeletal:         General: Normal range of motion.      Cervical back: Normal range of motion and neck supple.   Skin:     General: Skin is warm and dry.      Capillary Refill: Capillary refill takes less than 2 seconds.   Neurological:      Mental Status: He is alert. Mental status is at baseline.       Vents:  Vent Mode: CPAP PSV (04/22/22 0800)  Ventilator Initiated: Yes (04/10/22 0343)  Set Rate: 14 BPM (04/22/22 0800)  Vt Set: 480 mL (04/22/22 0540)  Pressure Support: 10 cmH20 (04/21/22 2205)  PEEP/CPAP: 5 cmH20 (04/22/22 0800)  Oxygen Concentration (%): 35 (04/22/22 0540)  Peak Airway Pressure: 16 cmH2O (04/22/22 0800)  Plateau Pressure: 20 cmH20 (03/07/22 0500)  Total Ve:  14.6 mL (04/22/22 0800)  F/VT Ratio<105 (RSBI): (!) 38.28 (04/22/22 0800)    Lines/Drains/Airways       Central Venous Catheter Line  Duration                  Hemodialysis Catheter 12/30/21 0900 right internal jugular 112 days              Drain  Duration                  Colostomy 12/18/21 1030 Descending/sigmoid  days         Gastrostomy/Enterostomy 03/09/22 1436 Percutaneous endoscopic gastrostomy (PEG) midline feeding 43 days              Airway  Duration                  Surgical Airway 01/04/22 Shiley 108 days              Peripheral Intravenous Line  Duration                  Peripheral IV - Single Lumen 04/11/22 1623 18 G Anterior;Proximal;Right Forearm 10 days                    Significant Labs:    CBC/Anemia Profile:  Recent Labs   Lab 04/22/22  0255   WBC 19.32*   HGB 7.2*   HCT 21.6*   *   MCV 92.3   RDW 16.5*        Chemistries:  No results for input(s): NA, K, CL, CO2, BUN, CREATININE, CALCIUM, ALBUMIN, PROT, BILITOT, ALKPHOS, ALT, AST, GLUCOSE, MG, PHOS in the last 48 hours.    All pertinent labs within the past 24 hours have been reviewed.    Significant Imaging:  I have reviewed all pertinent imaging results/findings within the past 24 hours.

## 2022-04-22 NOTE — SUBJECTIVE & OBJECTIVE
Interval History: The patient's condition is about the same.  He remains ventilated.    Review of patient's allergies indicates:  No Known Allergies  Current Facility-Administered Medications   Medication Frequency    0.9%  NaCl infusion (for blood administration) Q24H PRN    0.9%  NaCl infusion PRN    acetaminophen tablet 500 mg Q6H PRN    albuterol nebulizer solution 2.5 mg Q4H PRN    albuterol-ipratropium 2.5 mg-0.5 mg/3 mL nebulizer solution 3 mL Q6H    bisacodyL EC tablet 10 mg Daily PRN    calcium gluconate 1 g in dextrose 5 % in water (D5W) 5 % 100 mL IVPB (MB+) Once    dextrose 50% injection 12.5 g PRN    diltiaZEM tablet 90 mg Q6H    glucagon (human recombinant) injection 1 mg PRN    guaiFENesin 100 mg/5 ml syrup 200 mg Q6H    heparin (porcine) injection 4,000 Units PRN    heparin (porcine) injection 5,000 Units Q8H    insulin aspart U-100 injection 1-10 Units Q6H    insulin detemir U-100 injection 25 Units QHS    metoprolol injection 5 mg Q4H PRN    ondansetron injection 8 mg Q6H PRN    pantoprazole suspension 40 mg Daily    piperacillin-tazobactam (ZOSYN) 4.5 g in dextrose 5 % in water (D5W) 5 % 100 mL IVPB (MB+) Q12H    predniSONE tablet 5 mg Daily    sevelamer carbonate pwpk 0.8 g TID WM    silver sulfADIAZINE 1% cream Daily PRN    simethicone chewable tablet 80 mg TID PRN    tobramycin - pharmacy to dose pharmacy to manage frequency    vancomycin - pharmacy to dose pharmacy to manage frequency       Objective:     Vital Signs (Most Recent):  Temp: 99.6 °F (37.6 °C) (04/21/22 1900)  Pulse: 104 (04/21/22 1948)  Resp: (!) 26 (04/21/22 1948)  BP: 119/62 (04/21/22 1900)  SpO2: 100 % (04/21/22 1948)  O2 Device (Oxygen Therapy): ventilator (04/21/22 1620)   Vital Signs (24h Range):  Temp:  [98 °F (36.7 °C)-100 °F (37.8 °C)] 99.6 °F (37.6 °C)  Pulse:  [101-117] 104  Resp:  [13-39] 26  SpO2:  [98 %-100 %] 100 %  BP: (100-148)/(48-74) 119/62     Weight: 76.6 kg (168 lb 14 oz) (04/21/22 0600)  Body mass index is  23.55 kg/m².  Body surface area is 1.96 meters squared.    I/O last 3 completed shifts:  In: 3970 [P.O.:720; NG/GT:3150; IV Piggyback:100]  Out: 3200 [Other:2500; Stool:700]    Physical Exam  Vitals reviewed.   HENT:      Head: Normocephalic and atraumatic.      Mouth/Throat:      Mouth: Mucous membranes are moist.   Cardiovascular:      Rate and Rhythm: Regular rhythm.      Pulses: Normal pulses.   Pulmonary:      Effort: Pulmonary effort is normal.      Comments: ventilated  Abdominal:      Palpations: Abdomen is soft.       Significant Labs:  BMP: No results for input(s): GLU, NA, K, CL, CO2, BUN, CREATININE, CALCIUM, MG in the last 168 hours.  CBC:   Recent Labs   Lab 04/18/22  1013   WBC 21.49*   RBC 2.34*   HGB 7.0*   HCT 20.7*   *   MCV 88.5   MCH 29.9   MCHC 33.8        Significant Imaging:  Labs: Reviewed

## 2022-04-22 NOTE — ASSESSMENT & PLAN NOTE
4/21- Tmax 102.3 - ID consulted   4/22- Tmax 100.0 - ID note and plan reviewed- ( Blood cultures x2 sets ordered; Empirically start tobramycin, vancomycin, and Zosyn; Depending on cultures we can adjust antibiotic therapy.  If blood cultures come back negative, I would treat with antibiotics for 7-10 days depending on how he does clinically)

## 2022-04-22 NOTE — ASSESSMENT & PLAN NOTE
Was doing trach collar over the weekend but had to be placed back on the vent earlier this week   - will treat his pain- hopefully this will improve his HR and then start back on weaning trails   4/7- aspirated yesterday - febrile this morning. Will start Clindamycin today -   4/8- clinically looks better- will resume PSV today as tolerated   4/9- increased respiratory rate and tachycardic on exam.  Patient had just been turned.  This is all likely secondary to pain.  Will treat his pain and then increase his CPAP to 4 hours t.i.d. as tolerated.  4/13- to OR today  Then  -continue PSV as tolerated   4/22- plan for18 hours PSV and rest on vent at night

## 2022-04-22 NOTE — ASSESSMENT & PLAN NOTE
Dialysis MWF  Continue with scheduled hemodialysis  4/12/2022  Dialysis sessions are going well.  4/13/2022 Continue with scheduled hemodialysis for this patient.  4/14/2022  Continue with dialysis  4/16/2022 Chronic condition which is stable.  4/17/2022  Dialysis as scheduled.  4/19/2022  Continue with dialysis as scheduled.  4/21/2022 Continue dialysis.  4/22/2022  Dialysis as scheduled.

## 2022-04-22 NOTE — ASSESSMENT & PLAN NOTE
Dialysis MWF  Continue with scheduled hemodialysis  4/12/2022  Dialysis sessions are going well.  4/13/2022 Continue with scheduled hemodialysis for this patient.  4/14/2022  Continue with dialysis  4/16/2022 Chronic condition which is stable.  4/17/2022  Dialysis as scheduled.  4/19/2022  Continue with dialysis as scheduled.  4/21/2022 Continue dialysis.

## 2022-04-22 NOTE — PROGRESS NOTES
[facility]  Nephrology  Progress Note    Patient Name: Og Garcia  MRN: [id number]  Admission Date: 12/23/2021  Hospital Length of Stay: 119 days  Attending Provider: Cecil Abernathy DO   Primary Care Physician: Hector Arndt DNP, FNP-C  Principal Problem:Denice gangrene    Subjective:     HPI: The patient is known from the previous nephrology consult at [facility].  He is no at Specialty and continues to need dialysis support.      Interval History: The patient's condition is about the same.  He remains ventilated.    Review of patient's allergies indicates:  No Known Allergies  Current Facility-Administered Medications   Medication Frequency    0.9%  NaCl infusion (for blood administration) Q24H PRN    0.9%  NaCl infusion PRN    acetaminophen tablet 500 mg Q6H PRN    albuterol nebulizer solution 2.5 mg Q4H PRN    albuterol-ipratropium 2.5 mg-0.5 mg/3 mL nebulizer solution 3 mL Q6H    bisacodyL EC tablet 10 mg Daily PRN    calcium gluconate 1 g in dextrose 5 % in water (D5W) 5 % 100 mL IVPB (MB+) Once    dextrose 50% injection 12.5 g PRN    diltiaZEM tablet 90 mg Q6H    glucagon (human recombinant) injection 1 mg PRN    guaiFENesin 100 mg/5 ml syrup 200 mg Q6H    heparin (porcine) injection 4,000 Units PRN    heparin (porcine) injection 5,000 Units Q8H    insulin aspart U-100 injection 1-10 Units Q6H    insulin detemir U-100 injection 25 Units QHS    metoprolol injection 5 mg Q4H PRN    ondansetron injection 8 mg Q6H PRN    pantoprazole suspension 40 mg Daily    piperacillin-tazobactam (ZOSYN) 4.5 g in dextrose 5 % in water (D5W) 5 % 100 mL IVPB (MB+) Q12H    predniSONE tablet 5 mg Daily    sevelamer carbonate pwpk 0.8 g TID WM    silver sulfADIAZINE 1% cream Daily PRN    simethicone chewable tablet 80 mg TID PRN    tobramycin - pharmacy to dose pharmacy to manage frequency    vancomycin - pharmacy to dose pharmacy to manage frequency       Objective:      Vital Signs (Most Recent):  Temp: 99.6 °F (37.6 °C) (04/21/22 1900)  Pulse: 104 (04/21/22 1948)  Resp: (!) 26 (04/21/22 1948)  BP: 119/62 (04/21/22 1900)  SpO2: 100 % (04/21/22 1948)  O2 Device (Oxygen Therapy): ventilator (04/21/22 1620)   Vital Signs (24h Range):  Temp:  [98 °F (36.7 °C)-100 °F (37.8 °C)] 99.6 °F (37.6 °C)  Pulse:  [101-117] 104  Resp:  [13-39] 26  SpO2:  [98 %-100 %] 100 %  BP: (100-148)/(48-74) 119/62     Weight: 76.6 kg (168 lb 14 oz) (04/21/22 0600)  Body mass index is 23.55 kg/m².  Body surface area is 1.96 meters squared.    I/O last 3 completed shifts:  In: 3970 [P.O.:720; NG/GT:3150; IV Piggyback:100]  Out: 3200 [Other:2500; Stool:700]    Physical Exam  Vitals reviewed.   HENT:      Head: Normocephalic and atraumatic.      Mouth/Throat:      Mouth: Mucous membranes are moist.   Cardiovascular:      Rate and Rhythm: Regular rhythm.      Pulses: Normal pulses.   Pulmonary:      Effort: Pulmonary effort is normal.      Comments: ventilated  Abdominal:      Palpations: Abdomen is soft.       Significant Labs:  BMP: No results for input(s): GLU, NA, K, CL, CO2, BUN, CREATININE, CALCIUM, MG in the last 168 hours.  CBC:   Recent Labs   Lab 04/18/22  1013   WBC 21.49*   RBC 2.34*   HGB 7.0*   HCT 20.7*   *   MCV 88.5   MCH 29.9   MCHC 33.8        Significant Imaging:  Labs: Reviewed    Assessment/Plan:     ESRD (end stage renal disease)  Dialysis MWF  Continue with scheduled hemodialysis  4/12/2022  Dialysis sessions are going well.  4/13/2022 Continue with scheduled hemodialysis for this patient.  4/14/2022  Continue with dialysis  4/16/2022 Chronic condition which is stable.  4/17/2022  Dialysis as scheduled.  4/19/2022  Continue with dialysis as scheduled.  4/21/2022 Continue dialysis.        Thank you for your consult. I will follow-up with patient. Please contact us if you have any additional questions.    Edwin Pryor Jr, MD  Nephrology  [facility]

## 2022-04-22 NOTE — ASSESSMENT & PLAN NOTE
Treated anemia   - concern this may be releated to pain and pain medication withdraw - his fentanyl patch was discontinued over the weekend which he has been on for weeks.  -plan - will start PO pain medication q8hrs then decrease to q12 hours after 7 days.   4/8 - improving   4/9-heart rate up on exam.  Secondary to pain.  Will treat p.r.n. pain medication.  4/14- had some SVT yesterday that improved with treating his pain and lopressor  4/20- HR in 110's on ditalizem - will add low dose BB

## 2022-04-22 NOTE — SUBJECTIVE & OBJECTIVE
Interval History: The patient is resting.  His situation is about the same.    Review of patient's allergies indicates:  No Known Allergies  Current Facility-Administered Medications   Medication Frequency    0.9%  NaCl infusion (for blood administration) Q24H PRN    0.9%  NaCl infusion PRN    acetaminophen tablet 500 mg Q6H PRN    albuterol nebulizer solution 2.5 mg Q4H PRN    albuterol-ipratropium 2.5 mg-0.5 mg/3 mL nebulizer solution 3 mL Q6H    bisacodyL EC tablet 10 mg Daily PRN    calcium gluconate 1 g in dextrose 5 % in water (D5W) 5 % 100 mL IVPB (MB+) Once    dextrose 50% injection 12.5 g PRN    diltiaZEM tablet 90 mg Q6H    glucagon (human recombinant) injection 1 mg PRN    guaiFENesin 100 mg/5 ml syrup 200 mg Q6H    heparin (porcine) injection 4,000 Units PRN    heparin (porcine) injection 5,000 Units Q8H    insulin aspart U-100 injection 1-10 Units Q6H    insulin detemir U-100 injection 25 Units QHS    metoprolol injection 5 mg Q4H PRN    metoprolol tartrate (LOPRESSOR) split tablet 12.5 mg BID    ondansetron injection 8 mg Q6H PRN    pantoprazole suspension 40 mg Daily    piperacillin-tazobactam (ZOSYN) 4.5 g in dextrose 5 % in water (D5W) 5 % 100 mL IVPB (MB+) Q12H    predniSONE tablet 5 mg Daily    sevelamer carbonate pwpk 0.8 g TID WM    silver sulfADIAZINE 1% cream Daily PRN    simethicone chewable tablet 80 mg TID PRN    tobramycin - pharmacy to dose pharmacy to manage frequency    vancomycin (VANCOCIN) 1,500 mg in dextrose 5 % 250 mL IVPB Once    vancomycin - pharmacy to dose pharmacy to manage frequency       Objective:     Vital Signs (Most Recent):  Temp: 98 °F (36.7 °C) (04/22/22 0855)  Pulse: (!) 117 (04/22/22 1320)  Resp: (!) 36 (04/22/22 1320)  BP: (!) 101/50 (04/22/22 1155)  SpO2: 100 % (04/22/22 1320)  O2 Device (Oxygen Therapy): ventilator (04/22/22 4007)   Vital Signs (24h Range):  Temp:  [97.7 °F (36.5 °C)-99.6 °F (37.6 °C)] 98 °F (36.7 °C)  Pulse:  [] 117  Resp:  [14-39]  36  SpO2:  [97 %-100 %] 100 %  BP: ()/(44-74) 101/50     Weight: 76.3 kg (168 lb 3.4 oz) (04/22/22 2039)  Body mass index is 23.46 kg/m².  Body surface area is 1.96 meters squared.    I/O last 3 completed shifts:  In: 2600 [P.O.:360; NG/GT:2040; IV Piggyback:200]  Out: 600 [Stool:600]    Physical Exam  Vitals reviewed.   HENT:      Head: Atraumatic.   Cardiovascular:      Rate and Rhythm: Regular rhythm.      Pulses: Normal pulses.   Pulmonary:      Effort: Pulmonary effort is normal.      Comments: ventilated  Abdominal:      Palpations: Abdomen is soft.   Neurological:      Mental Status: He is alert.       Significant Labs:  BMP: No results for input(s): GLU, NA, K, CL, CO2, BUN, CREATININE, CALCIUM, MG in the last 168 hours.  CBC:   Recent Labs   Lab 04/22/22  0255   WBC 19.32*   RBC 2.34*   HGB 7.2*   HCT 21.6*   *   MCV 92.3   MCH 30.8   MCHC 33.3        Significant Imaging:  Labs: Reviewed

## 2022-04-22 NOTE — PROGRESS NOTES
[facility]  Pulmonology  Progress Note    Patient Name: Og Garcia  MRN: [id number]  Admission Date: 12/23/2021  Hospital Length of Stay: 120 days  Code Status: Prior  Attending Provider: Cecil Abernathy DO  Primary Care Provider: Hector Arndt DNP, FNP-C   Principal Problem: Denice gangrene    Subjective:     Interval History: Seen on HD. No complaints. HR in 110's on telemonitor    Objective:     Vital Signs (Most Recent):  Temp: 98 °F (36.7 °C) (04/22/22 0855)  Pulse: (!) 116 (04/22/22 0930)  Resp: (!) 35 (04/22/22 0930)  BP: (!) 100/49 (04/22/22 0930)  SpO2: 100 % (04/22/22 0930)   Vital Signs (24h Range):  Temp:  [97.7 °F (36.5 °C)-100 °F (37.8 °C)] 98 °F (36.7 °C)  Pulse:  [] 116  Resp:  [13-39] 35  SpO2:  [97 %-100 %] 100 %  BP: ()/(44-74) 100/49     Weight: 76.3 kg (168 lb 3.4 oz)  Body mass index is 23.46 kg/m².      Intake/Output Summary (Last 24 hours) at 4/22/2022 0941  Last data filed at 4/21/2022 1910  Gross per 24 hour   Intake 1490 ml   Output 500 ml   Net 990 ml       Physical Exam  Vitals reviewed.   Constitutional:       General: He is not in acute distress.     Comments: Chronically ill appearing   HENT:      Right Ear: External ear normal.      Left Ear: External ear normal.      Mouth/Throat:      Mouth: Mucous membranes are moist.   Eyes:      Pupils: Pupils are equal, round, and reactive to light.   Cardiovascular:      Rate and Rhythm: Regular rhythm. Tachycardia present.   Pulmonary:      Breath sounds: Normal breath sounds. No wheezing, rhonchi or rales.   Abdominal:      Palpations: Abdomen is soft.   Musculoskeletal:         General: Normal range of motion.      Cervical back: Normal range of motion and neck supple.   Skin:     General: Skin is warm and dry.      Capillary Refill: Capillary refill takes less than 2 seconds.   Neurological:      Mental Status: He is alert. Mental status is at baseline.       Vents:  Vent Mode: CPAP PSV  (04/22/22 0800)  Ventilator Initiated: Yes (04/10/22 0343)  Set Rate: 14 BPM (04/22/22 0800)  Vt Set: 480 mL (04/22/22 0540)  Pressure Support: 10 cmH20 (04/21/22 2205)  PEEP/CPAP: 5 cmH20 (04/22/22 0800)  Oxygen Concentration (%): 35 (04/22/22 0540)  Peak Airway Pressure: 16 cmH2O (04/22/22 0800)  Plateau Pressure: 20 cmH20 (03/07/22 0500)  Total Ve: 14.6 mL (04/22/22 0800)  F/VT Ratio<105 (RSBI): (!) 38.28 (04/22/22 0800)    Lines/Drains/Airways       Central Venous Catheter Line  Duration                  Hemodialysis Catheter 12/30/21 0900 right internal jugular 112 days              Drain  Duration                  Colostomy 12/18/21 1030 Descending/sigmoid  days         Gastrostomy/Enterostomy 03/09/22 1436 Percutaneous endoscopic gastrostomy (PEG) midline feeding 43 days              Airway  Duration                  Surgical Airway 01/04/22 Shiley 108 days              Peripheral Intravenous Line  Duration                  Peripheral IV - Single Lumen 04/11/22 1623 18 G Anterior;Proximal;Right Forearm 10 days                    Significant Labs:    CBC/Anemia Profile:  Recent Labs   Lab 04/22/22  0255   WBC 19.32*   HGB 7.2*   HCT 21.6*   *   MCV 92.3   RDW 16.5*        Chemistries:  No results for input(s): NA, K, CL, CO2, BUN, CREATININE, CALCIUM, ALBUMIN, PROT, BILITOT, ALKPHOS, ALT, AST, GLUCOSE, MG, PHOS in the last 48 hours.    All pertinent labs within the past 24 hours have been reviewed.    Significant Imaging:  I have reviewed all pertinent imaging results/findings within the past 24 hours.    Assessment/Plan:     * Denice gangrene  - s/p multiple debridements  - 2/21 back to OR for skin grafting  - 3/9 had peg tube placed     Wound culture reviewed - with pt spiking a fever will ask ID for assistance on antibiotic recommendations       Chronic respiratory failure  Was doing trach collar over the weekend but had to be placed back on the vent earlier this week   - will treat his  pain- hopefully this will improve his HR and then start back on weaning trails   4/7- aspirated yesterday - febrile this morning. Will start Clindamycin today -   4/8- clinically looks better- will resume PSV today as tolerated   4/9- increased respiratory rate and tachycardic on exam.  Patient had just been turned.  This is all likely secondary to pain.  Will treat his pain and then increase his CPAP to 4 hours t.i.d. as tolerated.  4/13- to OR today  Then  -continue PSV as tolerated   4/22- plan for18 hours PSV and rest on vent at night     Tachycardia  Treated anemia   - concern this may be releated to pain and pain medication withdraw - his fentanyl patch was discontinued over the weekend which he has been on for weeks.  -plan - will start PO pain medication q8hrs then decrease to q12 hours after 7 days.   4/8 - improving   4/9-heart rate up on exam.  Secondary to pain.  Will treat p.r.n. pain medication.  4/14- had some SVT yesterday that improved with treating his pain and lopressor  4/20- HR in 110's on ditalizem - will add low dose BB       Fever    4/21- Tmax 102.3 - ID consulted   4/22- Tmax 100.0 - ID note and plan reviewed- ( Blood cultures x2 sets ordered; Empirically start tobramycin, vancomycin, and Zosyn; Depending on cultures we can adjust antibiotic therapy.  If blood cultures come back negative, I would treat with antibiotics for 7-10 days depending on how he does clinically)    ESRD (end stage renal disease)  Started on HD 12/30   Continue with HD per nephrology      Type 2 diabetes mellitus without complication, without long-term current use of insulin  Continue with current care      Acute blood loss anemia  No active bleeding  Hgb 6.0 on 4/11  Patient transfused 1 unit with dialysis yesterday  4/13 -H/H 7.5/22.7  4/15- hgb is 6.2 today. We are going to give another unit of PRBC's today with dialysis  4/16 post transfusion cbc is pending  4/18- hgb is 6.6. Will plan for transfusion with 1 unit  of prbc's with next HD  4/21 - 7.2/21.6     Necrotizing fasciitis  S/p multiple surgeries   Last debridement for wounds was 4/13                   Elinor Baker, AG-ACNP  Pulmonology  [facility]

## 2022-04-22 NOTE — PLAN OF CARE
Problem: Adult Inpatient Plan of Care  Goal: Plan of Care Review  Outcome: Ongoing, Progressing  Flowsheets (Taken 4/22/2022 1721)  Plan of Care Reviewed With: patient  Goal: Patient-Specific Goal (Individualized)  Outcome: Ongoing, Progressing  Flowsheets (Taken 4/22/2022 1721)  Anxieties, Fears or Concerns: Fear of not getting any better  Individualized Care Needs: Patient will be able to maintain oxygen saturation on trach collar  Patient-Specific Goals (Include Timeframe): Patient's family will understand the care needed for patient prior to discharge  Goal: Absence of Hospital-Acquired Illness or Injury  Outcome: Ongoing, Progressing  Goal: Optimal Comfort and Wellbeing  Outcome: Ongoing, Progressing

## 2022-04-22 NOTE — NURSING
At bedrest on ventilator .. total care drsgs to sacral and austyn feet dry/ intact safety and comfort measures ongoing.. nom distress noted

## 2022-04-22 NOTE — PROGRESS NOTES
[facility]  Nephrology  Progress Note    Patient Name: Og Garcia  MRN: [id number]  Admission Date: 12/23/2021  Hospital Length of Stay: 120 days  Attending Provider: Cecil Abernathy DO   Primary Care Physician: Hector Arndt DNP, FNP-C  Principal Problem:Denice gangrene    Subjective:     HPI: The patient is known from the previous nephrology consult at [facility].  He is no at Specialty and continues to need dialysis support.      Interval History: The patient is resting.  His situation is about the same.    Review of patient's allergies indicates:  No Known Allergies  Current Facility-Administered Medications   Medication Frequency    0.9%  NaCl infusion (for blood administration) Q24H PRN    0.9%  NaCl infusion PRN    acetaminophen tablet 500 mg Q6H PRN    albuterol nebulizer solution 2.5 mg Q4H PRN    albuterol-ipratropium 2.5 mg-0.5 mg/3 mL nebulizer solution 3 mL Q6H    bisacodyL EC tablet 10 mg Daily PRN    calcium gluconate 1 g in dextrose 5 % in water (D5W) 5 % 100 mL IVPB (MB+) Once    dextrose 50% injection 12.5 g PRN    diltiaZEM tablet 90 mg Q6H    glucagon (human recombinant) injection 1 mg PRN    guaiFENesin 100 mg/5 ml syrup 200 mg Q6H    heparin (porcine) injection 4,000 Units PRN    heparin (porcine) injection 5,000 Units Q8H    insulin aspart U-100 injection 1-10 Units Q6H    insulin detemir U-100 injection 25 Units QHS    metoprolol injection 5 mg Q4H PRN    metoprolol tartrate (LOPRESSOR) split tablet 12.5 mg BID    ondansetron injection 8 mg Q6H PRN    pantoprazole suspension 40 mg Daily    piperacillin-tazobactam (ZOSYN) 4.5 g in dextrose 5 % in water (D5W) 5 % 100 mL IVPB (MB+) Q12H    predniSONE tablet 5 mg Daily    sevelamer carbonate pwpk 0.8 g TID WM    silver sulfADIAZINE 1% cream Daily PRN    simethicone chewable tablet 80 mg TID PRN    tobramycin - pharmacy to dose pharmacy to manage frequency    vancomycin (VANCOCIN) 1,500 mg  in dextrose 5 % 250 mL IVPB Once    vancomycin - pharmacy to dose pharmacy to manage frequency       Objective:     Vital Signs (Most Recent):  Temp: 98 °F (36.7 °C) (04/22/22 0855)  Pulse: (!) 117 (04/22/22 1320)  Resp: (!) 36 (04/22/22 1320)  BP: (!) 101/50 (04/22/22 1155)  SpO2: 100 % (04/22/22 1320)  O2 Device (Oxygen Therapy): ventilator (04/22/22 0540)   Vital Signs (24h Range):  Temp:  [97.7 °F (36.5 °C)-99.6 °F (37.6 °C)] 98 °F (36.7 °C)  Pulse:  [] 117  Resp:  [14-39] 36  SpO2:  [97 %-100 %] 100 %  BP: ()/(44-74) 101/50     Weight: 76.3 kg (168 lb 3.4 oz) (04/22/22 0349)  Body mass index is 23.46 kg/m².  Body surface area is 1.96 meters squared.    I/O last 3 completed shifts:  In: 2600 [P.O.:360; NG/GT:2040; IV Piggyback:200]  Out: 600 [Stool:600]    Physical Exam  Vitals reviewed.   HENT:      Head: Atraumatic.   Cardiovascular:      Rate and Rhythm: Regular rhythm.      Pulses: Normal pulses.   Pulmonary:      Effort: Pulmonary effort is normal.      Comments: ventilated  Abdominal:      Palpations: Abdomen is soft.   Neurological:      Mental Status: He is alert.       Significant Labs:  BMP: No results for input(s): GLU, NA, K, CL, CO2, BUN, CREATININE, CALCIUM, MG in the last 168 hours.  CBC:   Recent Labs   Lab 04/22/22  0255   WBC 19.32*   RBC 2.34*   HGB 7.2*   HCT 21.6*   *   MCV 92.3   MCH 30.8   MCHC 33.3        Significant Imaging:  Labs: Reviewed    Assessment/Plan:     * Denice gangrene  Continue wound care  Managed by wound care  Wound care    ESRD (end stage renal disease)  Dialysis MWF  Continue with scheduled hemodialysis  4/12/2022  Dialysis sessions are going well.  4/13/2022 Continue with scheduled hemodialysis for this patient.  4/14/2022  Continue with dialysis  4/16/2022 Chronic condition which is stable.  4/17/2022  Dialysis as scheduled.  4/19/2022  Continue with dialysis as scheduled.  4/21/2022 Continue dialysis.  4/22/2022  Dialysis as scheduled.    Type 2  diabetes mellitus without complication, without long-term current use of insulin  Chronic condition        Thank you for your consult. I will follow-up with patient. Please contact us if you have any additional questions.    Edwin Pryor Jr, MD  Nephrology  [facility]

## 2022-04-22 NOTE — PROGRESS NOTES
Placed patient back on the vent via ACV with previous settings for rest.  Patient did 16 hours of CPAP 5, PSV 10, FIO2 35% with no complications.

## 2022-04-22 NOTE — ASSESSMENT & PLAN NOTE
No active bleeding  Hgb 6.0 on 4/11  Patient transfused 1 unit with dialysis yesterday  4/13 -H/H 7.5/22.7  4/15- hgb is 6.2 today. We are going to give another unit of PRBC's today with dialysis  4/16 post transfusion cbc is pending  4/18- hgb is 6.6. Will plan for transfusion with 1 unit of prbc's with next HD  4/21 - 7.2/21.6

## 2022-04-23 NOTE — PLAN OF CARE
Problem: Communication Impairment (Mechanical Ventilation, Invasive)  Goal: Effective Communication  Outcome: Ongoing, Progressing     Problem: Inability to Wean (Mechanical Ventilation, Invasive)  Goal: Mechanical Ventilation Liberation  Outcome: Ongoing, Progressing

## 2022-04-23 NOTE — NURSING
Instructed by pharmacy to hang vancomycin, one time doses can not be rescheduled , so instructed to proceed with the late administration.

## 2022-04-23 NOTE — PROGRESS NOTES
[facility]  Nephrology  Progress Note    Patient Name: Og Garcia  MRN: [id number]  Admission Date: 12/23/2021  Hospital Length of Stay: 121 days  Attending Provider: Cecil Abernathy DO   Primary Care Physician: Hector Arndt DNP, FNP-C  Principal Problem:Denice gangrene    Consults  Subjective:     Interval History: The patient is resting comfortably in bed .  Review of patient's allergies indicates:  No Known Allergies  Current Facility-Administered Medications   Medication Frequency    0.9%  NaCl infusion (for blood administration) Q24H PRN    0.9%  NaCl infusion PRN    acetaminophen tablet 500 mg Q6H PRN    albuterol nebulizer solution 2.5 mg Q4H PRN    albuterol-ipratropium 2.5 mg-0.5 mg/3 mL nebulizer solution 3 mL Q6H    bisacodyL EC tablet 10 mg Daily PRN    calcium gluconate 1 g in dextrose 5 % in water (D5W) 5 % 100 mL IVPB (MB+) Once    dextrose 50% injection 12.5 g PRN    diltiaZEM tablet 90 mg Q6H    glucagon (human recombinant) injection 1 mg PRN    guaiFENesin 100 mg/5 ml syrup 200 mg Q6H    heparin (porcine) injection 4,000 Units PRN    heparin (porcine) injection 5,000 Units Q8H    insulin aspart U-100 injection 1-10 Units Q6H    insulin detemir U-100 injection 25 Units QHS    metoprolol injection 5 mg Q4H PRN    metoprolol tartrate (LOPRESSOR) split tablet 12.5 mg BID    ondansetron injection 8 mg Q6H PRN    pantoprazole suspension 40 mg Daily    piperacillin-tazobactam (ZOSYN) 4.5 g in dextrose 5 % in water (D5W) 5 % 100 mL IVPB (MB+) Q12H    predniSONE tablet 5 mg Daily    sevelamer carbonate pwpk 0.8 g TID WM    silver sulfADIAZINE 1% cream Daily PRN    simethicone chewable tablet 80 mg TID PRN    tobramycin - pharmacy to dose pharmacy to manage frequency    vancomycin - pharmacy to dose pharmacy to manage frequency       Objective:     Vital Signs (Most Recent):  Temp: 99.6 °F (37.6 °C) (04/23/22 1200)  Pulse: 88 (04/23/22  1347)  Resp: (!) 35 (04/23/22 1347)  BP: (!) 114/56 (04/23/22 1330)  SpO2: 100 % (04/23/22 1347)  O2 Device (Oxygen Therapy): ventilator (04/23/22 0710) Vital Signs (24h Range):  Temp:  [98.2 °F (36.8 °C)-100.4 °F (38 °C)] 99.6 °F (37.6 °C)  Pulse:  [] 88  Resp:  [11-41] 35  SpO2:  [91 %-100 %] 100 %  BP: ()/(49-74) 114/56     Weight: 76.3 kg (168 lb 3.4 oz) (04/22/22 0349)  Body mass index is 23.46 kg/m².  Body surface area is 1.96 meters squared.    I/O last 3 completed shifts:  In: 220 [P.O.:120; IV Piggyback:100]  Out: -     Physical Exam   Lungs-rhonchi  Cor-no murmur or rub  Abd-soft  Ext1+pitting pedal edema    Significant Labs:sure  CBC:   Recent Labs   Lab 04/22/22  0255   WBC 19.32*   RBC 2.34*   HGB 7.2*   HCT 21.6*   *   MCV 92.3   MCH 30.8   MCHC 33.3     CMP: No results for input(s): GLU, CALCIUM, ALBUMIN, PROT, NA, K, CO2, CL, BUN, CREATININE, ALKPHOS, ALT, AST, BILITOT in the last 168 hours.  All labs within the past 24 hours have been reviewed.    Significant Imaging    Assessment/Plan:     Active Diagnoses:    Diagnosis Date Noted POA    PRINCIPAL PROBLEM:  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] 12/13/2021 Yes    Tachycardia [R00.0] 04/06/2022 No    Chronic respiratory failure [J96.10] 04/06/2022 Yes    Pressure ulcer of sacral region, unstageable [L89.150]  Yes    Fever [R50.9] 02/26/2022 No    ESRD (end stage renal disease) [N18.6] 02/23/2022 Yes    Open wound of right hand without foreign body [S61.401A]  Unknown    Pressure ulcer of left heel, unstageable [L89.620]  Unknown    Acute blood loss anemia [D62] 12/25/2021 No    Type 2 diabetes mellitus without complication, without long-term current use of insulin [E11.9] 12/25/2021 Yes    Necrotizing fasciitis [M72.6]  Yes    Hypertension [I10] 09/02/2021 Yes      Problems Resolved During this Admission:    Diagnosis Date Noted Date Resolved POA    Hypotension [I95.9] 04/10/2022 04/22/2022 Unknown    Vomiting [R11.10] 02/26/2022  03/02/2022 No    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 I will follow-up with patient. Please contact us if you have any additional questions.    Damir Avalos MD  Nephrology  [facility]

## 2022-04-23 NOTE — ASSESSMENT & PLAN NOTE
- s/p multiple debridements  - 2/21 back to OR for skin grafting  - 3/9 had peg tube placed

## 2022-04-23 NOTE — PROGRESS NOTES
[facility]  Pulmonology  Progress Note    Patient Name: Og Garcia  MRN: [id number]  Admission Date: 12/23/2021  Hospital Length of Stay: 121 days  Code Status: Prior  Attending Provider: Cecil Abernathy DO  Primary Care Provider: Hector Arndt DNP, FNP-C   Principal Problem: Denice gangrene    Subjective:     Interval History: No new changes today - Denies any complaints, tolerated PSV 18 hours     Objective:     Vital Signs (Most Recent):  Temp: 99.6 °F (37.6 °C) (04/23/22 1200)  Pulse: 96 (04/23/22 0900)  Resp: 20 (04/23/22 0900)  BP: 124/61 (04/23/22 0900)  SpO2: 100 % (04/23/22 0900) Vital Signs (24h Range):  Temp:  [98.2 °F (36.8 °C)-100.4 °F (38 °C)] 99.6 °F (37.6 °C)  Pulse:  [] 96  Resp:  [11-42] 20  SpO2:  [91 %-100 %] 100 %  BP: ()/(39-74) 124/61     Weight: 76.3 kg (168 lb 3.4 oz)  Body mass index is 23.46 kg/m².      Intake/Output Summary (Last 24 hours) at 4/23/2022 1220  Last data filed at 4/23/2022 0800  Gross per 24 hour   Intake 120 ml   Output --   Net 120 ml       Physical Exam  Vitals reviewed.   Constitutional:       General: He is not in acute distress.     Comments: Chronically ill appearing   HENT:      Right Ear: External ear normal.      Left Ear: External ear normal.      Mouth/Throat:      Mouth: Mucous membranes are moist.   Eyes:      Pupils: Pupils are equal, round, and reactive to light.   Cardiovascular:      Rate and Rhythm: Regular rhythm. Tachycardia present.   Pulmonary:      Breath sounds: Normal breath sounds. No wheezing, rhonchi or rales.   Abdominal:      Palpations: Abdomen is soft.   Musculoskeletal:         General: Normal range of motion.      Cervical back: Normal range of motion and neck supple.   Skin:     General: Skin is warm and dry.      Capillary Refill: Capillary refill takes less than 2 seconds.   Neurological:      Mental Status: He is alert. Mental status is at baseline.       Vents:  Vent Mode: A/C (04/23/22  0852)  Ventilator Initiated: No (04/23/22 0032)  Set Rate: 14 BPM (04/23/22 0852)  Vt Set: 480 mL (04/23/22 0852)  Pressure Support: 10 cmH20 (04/23/22 0515)  PEEP/CPAP: 5 cmH20 (04/23/22 0515)  Oxygen Concentration (%): 35 (04/23/22 0032)  Peak Airway Pressure: 12 cmH2O (04/23/22 0852)  Plateau Pressure: 20 cmH20 (03/07/22 0500)  Total Ve: 8.1 mL (04/23/22 0852)  F/VT Ratio<105 (RSBI): 241.38 (04/23/22 0032)    Lines/Drains/Airways       Central Venous Catheter Line  Duration                  Hemodialysis Catheter 12/30/21 0900 right internal jugular 114 days              Drain  Duration                  Colostomy 12/18/21 1030 Descending/sigmoid  days         Gastrostomy/Enterostomy 03/09/22 1436 Percutaneous endoscopic gastrostomy (PEG) midline feeding 44 days              Airway  Duration                  Surgical Airway 01/04/22 Shiley 109 days              Peripheral Intravenous Line  Duration                  Peripheral IV - Single Lumen 04/11/22 1623 18 G Anterior;Proximal;Right Forearm 11 days                    Significant Labs:    CBC/Anemia Profile:  Recent Labs   Lab 04/22/22  0255   WBC 19.32*   HGB 7.2*   HCT 21.6*   *   MCV 92.3   RDW 16.5*        Chemistries:  No results for input(s): NA, K, CL, CO2, BUN, CREATININE, CALCIUM, ALBUMIN, PROT, BILITOT, ALKPHOS, ALT, AST, GLUCOSE, MG, PHOS in the last 48 hours.    All pertinent labs within the past 24 hours have been reviewed.    Significant Imaging:  I have reviewed all pertinent imaging results/findings within the past 24 hours.    Assessment/Plan:     * Denice gangrene  - s/p multiple debridements  - 2/21 back to OR for skin grafting  - 3/9 had peg tube placed           Chronic respiratory failure  Was doing trach collar over the weekend but had to be placed back on the vent earlier this week   - will treat his pain- hopefully this will improve his HR and then start back on weaning trails   4/7- aspirated yesterday - febrile this  morning. Will start Clindamycin today -   4/8- clinically looks better- will resume PSV today as tolerated   4/9- increased respiratory rate and tachycardic on exam.  Patient had just been turned.  This is all likely secondary to pain.  Will treat his pain and then increase his CPAP to 4 hours t.i.d. as tolerated.  4/13- to OR today  Then  -continue PSV as tolerated   4/23- plan for 20 hours PSV and rest on vent 4 hours     Tachycardia  Treated anemia   - concern this may be releated to pain and pain medication withdraw - his fentanyl patch was discontinued over the weekend which he has been on for weeks.  -plan - will start PO pain medication q8hrs then decrease to q12 hours after 7 days.   4/8 - improving   4/9-heart rate up on exam.  Secondary to pain.  Will treat p.r.n. pain medication.  4/14- had some SVT yesterday that improved with treating his pain and lopressor  4/23- HR in 110's on ditalizem - will add low dose BB   4/24- HR improved       Pressure ulcer of sacral region, unstageable  Culture and sensitivity reviewed -    Fever        ID note and plan reviewed- ( Blood cultures x2 sets ordered; Empirically start tobramycin, vancomycin, and Zosyn; Depending on cultures we can adjust antibiotic therapy.  If blood cultures come back negative, I would treat with antibiotics for 7-10 days depending on how he does clinically)    Type 2 diabetes mellitus without complication, without long-term current use of insulin  Continue with current care      Necrotizing fasciitis  S/p multiple surgeries   Last debridement for wounds was 4/13                 FLORESITA Shafer-ACNP  Pulmonology  [facility]

## 2022-04-23 NOTE — ASSESSMENT & PLAN NOTE
Treated anemia   - concern this may be releated to pain and pain medication withdraw - his fentanyl patch was discontinued over the weekend which he has been on for weeks.  -plan - will start PO pain medication q8hrs then decrease to q12 hours after 7 days.   4/8 - improving   4/9-heart rate up on exam.  Secondary to pain.  Will treat p.r.n. pain medication.  4/14- had some SVT yesterday that improved with treating his pain and lopressor  4/23- HR in 110's on ditalizem - will add low dose BB   4/24- HR improved

## 2022-04-23 NOTE — ASSESSMENT & PLAN NOTE
ID note and plan reviewed- ( Blood cultures x2 sets ordered; Empirically start tobramycin, vancomycin, and Zosyn; Depending on cultures we can adjust antibiotic therapy.  If blood cultures come back negative, I would treat with antibiotics for 7-10 days depending on how he does clinically)

## 2022-04-23 NOTE — ASSESSMENT & PLAN NOTE
Was doing trach collar over the weekend but had to be placed back on the vent earlier this week   - will treat his pain- hopefully this will improve his HR and then start back on weaning trails   4/7- aspirated yesterday - febrile this morning. Will start Clindamycin today -   4/8- clinically looks better- will resume PSV today as tolerated   4/9- increased respiratory rate and tachycardic on exam.  Patient had just been turned.  This is all likely secondary to pain.  Will treat his pain and then increase his CPAP to 4 hours t.i.d. as tolerated.  4/13- to OR today  Then  -continue PSV as tolerated   4/23- plan for 20 hours PSV and rest on vent 4 hours

## 2022-04-23 NOTE — SUBJECTIVE & OBJECTIVE
Interval History: No new changes today - Denies any complaints, tolerated PSV 18 hours     Objective:     Vital Signs (Most Recent):  Temp: 99.6 °F (37.6 °C) (04/23/22 1200)  Pulse: 96 (04/23/22 0900)  Resp: 20 (04/23/22 0900)  BP: 124/61 (04/23/22 0900)  SpO2: 100 % (04/23/22 0900) Vital Signs (24h Range):  Temp:  [98.2 °F (36.8 °C)-100.4 °F (38 °C)] 99.6 °F (37.6 °C)  Pulse:  [] 96  Resp:  [11-42] 20  SpO2:  [91 %-100 %] 100 %  BP: ()/(39-74) 124/61     Weight: 76.3 kg (168 lb 3.4 oz)  Body mass index is 23.46 kg/m².      Intake/Output Summary (Last 24 hours) at 4/23/2022 1220  Last data filed at 4/23/2022 0800  Gross per 24 hour   Intake 120 ml   Output --   Net 120 ml       Physical Exam  Vitals reviewed.   Constitutional:       General: He is not in acute distress.     Comments: Chronically ill appearing   HENT:      Right Ear: External ear normal.      Left Ear: External ear normal.      Mouth/Throat:      Mouth: Mucous membranes are moist.   Eyes:      Pupils: Pupils are equal, round, and reactive to light.   Cardiovascular:      Rate and Rhythm: Regular rhythm. Tachycardia present.   Pulmonary:      Breath sounds: Normal breath sounds. No wheezing, rhonchi or rales.   Abdominal:      Palpations: Abdomen is soft.   Musculoskeletal:         General: Normal range of motion.      Cervical back: Normal range of motion and neck supple.   Skin:     General: Skin is warm and dry.      Capillary Refill: Capillary refill takes less than 2 seconds.   Neurological:      Mental Status: He is alert. Mental status is at baseline.       Vents:  Vent Mode: A/C (04/23/22 0852)  Ventilator Initiated: No (04/23/22 0032)  Set Rate: 14 BPM (04/23/22 0852)  Vt Set: 480 mL (04/23/22 0852)  Pressure Support: 10 cmH20 (04/23/22 0515)  PEEP/CPAP: 5 cmH20 (04/23/22 0515)  Oxygen Concentration (%): 35 (04/23/22 0032)  Peak Airway Pressure: 12 cmH2O (04/23/22 0852)  Plateau Pressure: 20 cmH20 (03/07/22 0500)  Total Ve: 8.1 mL  (04/23/22 0852)  F/VT Ratio<105 (RSBI): 241.38 (04/23/22 0032)    Lines/Drains/Airways       Central Venous Catheter Line  Duration                  Hemodialysis Catheter 12/30/21 0900 right internal jugular 114 days              Drain  Duration                  Colostomy 12/18/21 1030 Descending/sigmoid  days         Gastrostomy/Enterostomy 03/09/22 1436 Percutaneous endoscopic gastrostomy (PEG) midline feeding 44 days              Airway  Duration                  Surgical Airway 01/04/22 Shiley 109 days              Peripheral Intravenous Line  Duration                  Peripheral IV - Single Lumen 04/11/22 1623 18 G Anterior;Proximal;Right Forearm 11 days                    Significant Labs:    CBC/Anemia Profile:  Recent Labs   Lab 04/22/22  0255   WBC 19.32*   HGB 7.2*   HCT 21.6*   *   MCV 92.3   RDW 16.5*        Chemistries:  No results for input(s): NA, K, CL, CO2, BUN, CREATININE, CALCIUM, ALBUMIN, PROT, BILITOT, ALKPHOS, ALT, AST, GLUCOSE, MG, PHOS in the last 48 hours.    All pertinent labs within the past 24 hours have been reviewed.    Significant Imaging:  I have reviewed all pertinent imaging results/findings within the past 24 hours.

## 2022-04-24 NOTE — ASSESSMENT & PLAN NOTE
Was doing trach collar over the weekend but had to be placed back on the vent earlier this week   - will treat his pain- hopefully this will improve his HR and then start back on weaning trails   4/7- aspirated yesterday - febrile this morning. Will start Clindamycin today -   4/8- clinically looks better- will resume PSV today as tolerated   4/9- increased respiratory rate and tachycardic on exam.  Patient had just been turned.  This is all likely secondary to pain.  Will treat his pain and then increase his CPAP to 4 hours t.i.d. as tolerated.  4/13- to OR today  Then  -continue PSV as tolerated   4/23- plan for 24 hours continuous

## 2022-04-24 NOTE — ASSESSMENT & PLAN NOTE
Treated anemia   - concern this may be releated to pain and pain medication withdraw - his fentanyl patch was discontinued over the weekend which he has been on for weeks.  -plan - will start PO pain medication q8hrs then decrease to q12 hours after 7 days.   4/8 - improving   4/9-heart rate up on exam.  Secondary to pain.  Will treat p.r.n. pain medication.  4/14- had some SVT yesterday that improved with treating his pain and lopressor  4/22- HR in 110's on ditalizem - will add low dose BB   4/24- HR improved

## 2022-04-24 NOTE — PLAN OF CARE
Problem: Adult Inpatient Plan of Care  Goal: Plan of Care Review  4/24/2022 0451 by Elida Wilburn RN  Outcome: Ongoing, Progressing  4/24/2022 0451 by Elida Wilburn RN  Outcome: Ongoing, Progressing  Goal: Patient-Specific Goal (Individualized)  4/24/2022 0451 by Elida Wilburn RN  Outcome: Ongoing, Progressing  4/24/2022 0451 by Elida Wilburn RN  Outcome: Ongoing, Progressing  Goal: Absence of Hospital-Acquired Illness or Injury  4/24/2022 0451 by Elida Wilburn RN  Outcome: Ongoing, Progressing  4/24/2022 0451 by Elida Wilburn RN  Outcome: Ongoing, Progressing  Goal: Optimal Comfort and Wellbeing  4/24/2022 0451 by Elida Wilburn RN  Outcome: Ongoing, Progressing  4/24/2022 0451 by Elida Wilburn RN  Outcome: Ongoing, Progressing  Goal: Readiness for Transition of Care  4/24/2022 0451 by Elida Wilburn RN  Outcome: Ongoing, Progressing  4/24/2022 0451 by Elida Wilburn RN  Outcome: Ongoing, Progressing     Problem: Adjustment to Illness (Sepsis/Septic Shock)  Goal: Optimal Coping  4/24/2022 0451 by Elida Wilburn RN  Outcome: Ongoing, Progressing  4/24/2022 0451 by Elida Wilburn RN  Outcome: Ongoing, Progressing     Problem: Bleeding (Sepsis/Septic Shock)  Goal: Absence of Bleeding  4/24/2022 0451 by Elida Wilburn RN  Outcome: Ongoing, Progressing  4/24/2022 0451 by Elida Wilburn RN  Outcome: Ongoing, Progressing     Problem: Glycemic Control Impaired (Sepsis/Septic Shock)  Goal: Blood Glucose Level Within Desired Range  4/24/2022 0451 by Elida Wilburn RN  Outcome: Ongoing, Progressing  4/24/2022 0451 by Elida Wilburn RN  Outcome: Ongoing, Progressing     Problem: Infection Progression (Sepsis/Septic Shock)  Goal: Absence of Infection Signs and Symptoms  4/24/2022 0451 by Elida Wilburn RN  Outcome: Ongoing, Progressing  4/24/2022 0451 by Elida Wilburn RN  Outcome: Ongoing, Progressing     Problem: Nutrition Impaired (Sepsis/Septic Shock)  Goal: Optimal Nutrition  Intake  4/24/2022 0451 by Elida Wilburn RN  Outcome: Ongoing, Progressing  4/24/2022 0451 by Elida Wilburn RN  Outcome: Ongoing, Progressing     Problem: Fluid and Electrolyte Imbalance (Acute Kidney Injury/Impairment)  Goal: Fluid and Electrolyte Balance  4/24/2022 0451 by Elida Wilburn RN  Outcome: Ongoing, Progressing  4/24/2022 0451 by Elida Wilburn RN  Outcome: Ongoing, Progressing     Problem: Oral Intake Inadequate (Acute Kidney Injury/Impairment)  Goal: Optimal Nutrition Intake  4/24/2022 0451 by Elida Wilburn RN  Outcome: Ongoing, Progressing  4/24/2022 0451 by Elida Wilburn RN  Outcome: Ongoing, Progressing     Problem: Renal Function Impairment (Acute Kidney Injury/Impairment)  Goal: Effective Renal Function  4/24/2022 0451 by Elida Wilburn RN  Outcome: Ongoing, Progressing  4/24/2022 0451 by Elida Wilburn RN  Outcome: Ongoing, Progressing     Problem: Infection  Goal: Absence of Infection Signs and Symptoms  4/24/2022 0451 by Elida Wilburn RN  Outcome: Ongoing, Progressing  4/24/2022 0451 by Elida Wilburn RN  Outcome: Ongoing, Progressing     Problem: Fall Injury Risk  Goal: Absence of Fall and Fall-Related Injury  4/24/2022 0451 by Elida Wilburn RN  Outcome: Ongoing, Progressing  4/24/2022 0451 by Elida Wilburn RN  Outcome: Ongoing, Progressing     Problem: Communication Impairment (Mechanical Ventilation, Invasive)  Goal: Effective Communication  4/24/2022 0451 by Elida Wilburn RN  Outcome: Ongoing, Progressing  4/24/2022 0451 by Elida Wilburn RN  Outcome: Ongoing, Progressing     Problem: Device-Related Complication Risk (Mechanical Ventilation, Invasive)  Goal: Optimal Device Function  4/24/2022 0451 by Elida Wilburn RN  Outcome: Ongoing, Progressing  4/24/2022 0451 by Elida Wliburn RN  Outcome: Ongoing, Progressing     Problem: Inability to Wean (Mechanical Ventilation, Invasive)  Goal: Mechanical Ventilation Liberation  4/24/2022 0451 by Elida  STEVEN Wilburn  Outcome: Ongoing, Progressing  4/24/2022 0451 by Elida Wilburn RN  Outcome: Ongoing, Progressing     Problem: Nutrition Impairment (Mechanical Ventilation, Invasive)  Goal: Optimal Nutrition Delivery  4/24/2022 0451 by Elida Wilburn RN  Outcome: Ongoing, Progressing  4/24/2022 0451 by Elida Wilburn RN  Outcome: Ongoing, Progressing     Problem: Skin and Tissue Injury (Mechanical Ventilation, Invasive)  Goal: Absence of Device-Related Skin and Tissue Injury  4/24/2022 0451 by Elida Wilburn RN  Outcome: Ongoing, Progressing  4/24/2022 0451 by Elida Wilburn RN  Outcome: Ongoing, Progressing     Problem: Ventilator-Induced Lung Injury (Mechanical Ventilation, Invasive)  Goal: Absence of Ventilator-Induced Lung Injury  4/24/2022 0451 by Elida Wilburn RN  Outcome: Ongoing, Progressing  4/24/2022 0451 by Elida Wilburn RN  Outcome: Ongoing, Progressing     Problem: Communication Impairment (Artificial Airway)  Goal: Effective Communication  4/24/2022 0451 by Elida Wilburn RN  Outcome: Ongoing, Progressing  4/24/2022 0451 by Elida Wilburn RN  Outcome: Ongoing, Progressing     Problem: Device-Related Complication Risk (Artificial Airway)  Goal: Optimal Device Function  4/24/2022 0451 by Elida Wilburn RN  Outcome: Ongoing, Progressing  4/24/2022 0451 by Elida Wilburn RN  Outcome: Ongoing, Progressing     Problem: Skin and Tissue Injury (Artificial Airway)  Goal: Absence of Device-Related Skin or Tissue Injury  4/24/2022 0451 by Elida Wilburn RN  Outcome: Ongoing, Progressing  4/24/2022 0451 by Elida Wilburn RN  Outcome: Ongoing, Progressing     Problem: Skin Injury Risk Increased  Goal: Skin Health and Integrity  4/24/2022 0451 by Elida Wilburn RN  Outcome: Ongoing, Progressing  4/24/2022 0451 by Elida Wilburn RN  Outcome: Ongoing, Progressing     Problem: Device-Related Complication Risk (Hemodialysis)  Goal: Safe, Effective Therapy Delivery  4/24/2022 0451 by Elida  STEVEN Wilburn  Outcome: Ongoing, Progressing  4/24/2022 0451 by Elida Wilburn RN  Outcome: Ongoing, Progressing     Problem: Hemodynamic Instability (Hemodialysis)  Goal: Effective Tissue Perfusion  4/24/2022 0451 by Elida Wilburn RN  Outcome: Ongoing, Progressing  4/24/2022 0451 by Elida Wilburn RN  Outcome: Ongoing, Progressing     Problem: Infection (Hemodialysis)  Goal: Absence of Infection Signs and Symptoms  4/24/2022 0451 by Elida Wilburn RN  Outcome: Ongoing, Progressing  4/24/2022 0451 by Elida Wilburn RN  Outcome: Ongoing, Progressing     Problem: Diabetes Comorbidity  Goal: Blood Glucose Level Within Targeted Range  4/24/2022 0451 by Elida Wilburn RN  Outcome: Ongoing, Progressing  4/24/2022 0451 by Elida Wilburn RN  Outcome: Ongoing, Progressing     Problem: Impaired Wound Healing  Goal: Optimal Wound Healing  4/24/2022 0451 by Elida Wilburn RN  Outcome: Ongoing, Progressing  4/24/2022 0451 by Elida Wilburn RN  Outcome: Ongoing, Progressing     Problem: Gas Exchange Impaired  Goal: Optimal Gas Exchange  4/24/2022 0451 by Elida Wilburn RN  Outcome: Ongoing, Progressing  4/24/2022 0451 by Elida Wilburn RN  Outcome: Ongoing, Progressing

## 2022-04-24 NOTE — PROGRESS NOTES
[facility]  Pulmonology  Progress Note    Patient Name: Og Garcia  MRN: [id number]  Admission Date: 12/23/2021  Hospital Length of Stay: 122 days  Code Status: Prior  Attending Provider: Cecil Abernathy DO  Primary Care Provider: Hector Arndt DNP, FNP-C   Principal Problem: Denice gangrene    Subjective:     Interval History: Awake, denies any complaints. Tolerating PSV     Objective:     Vital Signs (Most Recent):  Temp: 98.6 °F (37 °C) (04/24/22 1300)  Pulse: 91 (04/24/22 1233)  Resp: (!) 28 (04/24/22 1233)  BP: 117/61 (04/24/22 0842)  SpO2: 100 % (04/24/22 1233)   Vital Signs (24h Range):  Temp:  [98.6 °F (37 °C)-100 °F (37.8 °C)] 98.6 °F (37 °C)  Pulse:  [] 91  Resp:  [16-36] 28  SpO2:  [100 %] 100 %  BP: (101-117)/(52-61) 117/61     Weight: 76.3 kg (168 lb 3.4 oz)  Body mass index is 23.46 kg/m².      Intake/Output Summary (Last 24 hours) at 4/24/2022 1447  Last data filed at 4/24/2022 0800  Gross per 24 hour   Intake 340 ml   Output 300 ml   Net 40 ml       Physical Exam  Vitals reviewed.   Constitutional:       General: He is not in acute distress.     Comments: Chronically ill appearing   HENT:      Right Ear: External ear normal.      Left Ear: External ear normal.      Mouth/Throat:      Mouth: Mucous membranes are moist.   Eyes:      Pupils: Pupils are equal, round, and reactive to light.   Cardiovascular:      Rate and Rhythm: Normal rate and regular rhythm.   Pulmonary:      Breath sounds: Normal breath sounds. No wheezing, rhonchi or rales.   Abdominal:      Palpations: Abdomen is soft.   Musculoskeletal:         General: Normal range of motion.      Cervical back: Normal range of motion and neck supple.   Skin:     General: Skin is warm and dry.      Capillary Refill: Capillary refill takes less than 2 seconds.   Neurological:      Mental Status: He is alert. Mental status is at baseline.       Vents:  Vent Mode: CPAP / PSV (04/24/22 8993)  Ventilator  Initiated: No (04/23/22 1934)  Set Rate: 14 BPM (04/24/22 0754)  Vt Set: 480 mL (04/24/22 0754)  Pressure Support: 10 cmH20 (04/24/22 1233)  PEEP/CPAP: 5 cmH20 (04/24/22 1233)  Oxygen Concentration (%): 35 (04/24/22 1233)  Peak Airway Pressure: 16 cmH2O (04/24/22 1233)  Plateau Pressure: 20 cmH20 (03/07/22 0500)  Total Ve: 7.2 mL (04/24/22 1233)  F/VT Ratio<105 (RSBI): (!) 71.61 (04/24/22 1233)    Lines/Drains/Airways       Central Venous Catheter Line  Duration                  Hemodialysis Catheter 12/30/21 0900 right internal jugular 115 days              Drain  Duration                  Colostomy 12/18/21 1030 Descending/sigmoid  days         Gastrostomy/Enterostomy 03/09/22 1436 Percutaneous endoscopic gastrostomy (PEG) midline feeding 45 days              Airway  Duration                  Surgical Airway 01/04/22 Shiley 110 days              Peripheral Intravenous Line  Duration                  Peripheral IV - Single Lumen 04/11/22 1623 18 G Anterior;Proximal;Right Forearm 12 days                    Significant Labs:    CBC/Anemia Profile:  No results for input(s): WBC, HGB, HCT, PLT, MCV, RDW, IRON, FERRITIN, RETIC, FOLATE, [id number], OCCULTBLOOD in the last 48 hours.     Chemistries:  No results for input(s): NA, K, CL, CO2, BUN, CREATININE, CALCIUM, ALBUMIN, PROT, BILITOT, ALKPHOS, ALT, AST, GLUCOSE, MG, PHOS in the last 48 hours.    All pertinent labs within the past 24 hours have been reviewed.    Significant Imaging:  I have reviewed all pertinent imaging results/findings within the past 24 hours.    Assessment/Plan:     * Denice gangrene  - s/p multiple debridements  - 2/21 back to OR for skin grafting  - 3/9 had peg tube placed           Chronic respiratory failure  Was doing trach collar over the weekend but had to be placed back on the vent earlier this week   - will treat his pain- hopefully this will improve his HR and then start back on weaning trails   4/7- aspirated yesterday -  febrile this morning. Will start Clindamycin today -   4/8- clinically looks better- will resume PSV today as tolerated   4/9- increased respiratory rate and tachycardic on exam.  Patient had just been turned.  This is all likely secondary to pain.  Will treat his pain and then increase his CPAP to 4 hours t.i.d. as tolerated.  4/13- to OR today  Then  -continue PSV as tolerated   4/23- plan for 24 hours continuous     Tachycardia  Treated anemia   - concern this may be releated to pain and pain medication withdraw - his fentanyl patch was discontinued over the weekend which he has been on for weeks.  -plan - will start PO pain medication q8hrs then decrease to q12 hours after 7 days.   4/8 - improving   4/9-heart rate up on exam.  Secondary to pain.  Will treat p.r.n. pain medication.  4/14- had some SVT yesterday that improved with treating his pain and lopressor  4/22- HR in 110's on ditalizem - will add low dose BB   4/24- HR improved       Pressure ulcer of sacral region, unstageable  Culture and sensitivity reviewed -    Fever   ID note and plan reviewed- ( Blood cultures x2 sets ordered; Empirically start tobramycin, vancomycin, and Zosyn; Depending on cultures we can adjust antibiotic therapy.  If blood cultures come back negative, I would treat with antibiotics for 7-10 days depending on how he does clinically)    ESRD (end stage renal disease)  Started on HD 12/30   Continue with HD per nephrology      Type 2 diabetes mellitus without complication, without long-term current use of insulin  Continue with current care      Acute blood loss anemia  No active bleeding  Hgb 6.0 on 4/11  Patient transfused 1 unit with dialysis yesterday  4/13 -H/H 7.5/22.7  4/15- hgb is 6.2 today. We are going to give another unit of PRBC's today with dialysis  4/16 post transfusion cbc is pending  4/18- hgb is 6.6. Will plan for transfusion with 1 unit of prbc's with next HD  4/21 - 7.2/21.6     Necrotizing fasciitis  S/p  multiple surgeries   Last debridement for wounds was 4/13      Hypertension  BP currently ok                   Elinor Baker, FLORESITA-ACNP  Pulmonology  [facility]

## 2022-04-24 NOTE — SUBJECTIVE & OBJECTIVE
Interval History: Awake, denies any complaints. Tolerating PSV     Objective:     Vital Signs (Most Recent):  Temp: 98.6 °F (37 °C) (04/24/22 1300)  Pulse: 91 (04/24/22 1233)  Resp: (!) 28 (04/24/22 1233)  BP: 117/61 (04/24/22 0842)  SpO2: 100 % (04/24/22 1233)   Vital Signs (24h Range):  Temp:  [98.6 °F (37 °C)-100 °F (37.8 °C)] 98.6 °F (37 °C)  Pulse:  [] 91  Resp:  [16-36] 28  SpO2:  [100 %] 100 %  BP: (101-117)/(52-61) 117/61     Weight: 76.3 kg (168 lb 3.4 oz)  Body mass index is 23.46 kg/m².      Intake/Output Summary (Last 24 hours) at 4/24/2022 1447  Last data filed at 4/24/2022 0800  Gross per 24 hour   Intake 340 ml   Output 300 ml   Net 40 ml       Physical Exam  Vitals reviewed.   Constitutional:       General: He is not in acute distress.     Comments: Chronically ill appearing   HENT:      Right Ear: External ear normal.      Left Ear: External ear normal.      Mouth/Throat:      Mouth: Mucous membranes are moist.   Eyes:      Pupils: Pupils are equal, round, and reactive to light.   Cardiovascular:      Rate and Rhythm: Normal rate and regular rhythm.   Pulmonary:      Breath sounds: Normal breath sounds. No wheezing, rhonchi or rales.   Abdominal:      Palpations: Abdomen is soft.   Musculoskeletal:         General: Normal range of motion.      Cervical back: Normal range of motion and neck supple.   Skin:     General: Skin is warm and dry.      Capillary Refill: Capillary refill takes less than 2 seconds.   Neurological:      Mental Status: He is alert. Mental status is at baseline.       Vents:  Vent Mode: CPAP / PSV (04/24/22 1233)  Ventilator Initiated: No (04/23/22 1934)  Set Rate: 14 BPM (04/24/22 0754)  Vt Set: 480 mL (04/24/22 0754)  Pressure Support: 10 cmH20 (04/24/22 1233)  PEEP/CPAP: 5 cmH20 (04/24/22 1233)  Oxygen Concentration (%): 35 (04/24/22 1233)  Peak Airway Pressure: 16 cmH2O (04/24/22 1233)  Plateau Pressure: 20 cmH20 (03/07/22 0500)  Total Ve: 7.2 mL (04/24/22 1233)  F/VT  Ratio<105 (RSBI): (!) 71.61 (04/24/22 1233)    Lines/Drains/Airways       Central Venous Catheter Line  Duration                  Hemodialysis Catheter 12/30/21 0900 right internal jugular 115 days              Drain  Duration                  Colostomy 12/18/21 1030 Descending/sigmoid  days         Gastrostomy/Enterostomy 03/09/22 1436 Percutaneous endoscopic gastrostomy (PEG) midline feeding 45 days              Airway  Duration                  Surgical Airway 01/04/22 Shiley 110 days              Peripheral Intravenous Line  Duration                  Peripheral IV - Single Lumen 04/11/22 1623 18 G Anterior;Proximal;Right Forearm 12 days                    Significant Labs:    CBC/Anemia Profile:  No results for input(s): WBC, HGB, HCT, PLT, MCV, RDW, IRON, FERRITIN, RETIC, FOLATE, [id number], OCCULTBLOOD in the last 48 hours.     Chemistries:  No results for input(s): NA, K, CL, CO2, BUN, CREATININE, CALCIUM, ALBUMIN, PROT, BILITOT, ALKPHOS, ALT, AST, GLUCOSE, MG, PHOS in the last 48 hours.    All pertinent labs within the past 24 hours have been reviewed.    Significant Imaging:  I have reviewed all pertinent imaging results/findings within the past 24 hours.

## 2022-04-24 NOTE — PLAN OF CARE
Problem: Infection  Goal: Absence of Infection Signs and Symptoms  Outcome: Ongoing, Progressing     Problem: Fall Injury Risk  Goal: Absence of Fall and Fall-Related Injury  Outcome: Ongoing, Progressing     Problem: Communication Impairment (Mechanical Ventilation, Invasive)  Goal: Effective Communication  Outcome: Ongoing, Progressing

## 2022-04-25 NOTE — ASSESSMENT & PLAN NOTE
Was doing trach collar over the weekend but had to be placed back on the vent earlier this week   - will treat his pain- hopefully this will improve his HR and then start back on weaning trails   4/7- aspirated yesterday - febrile this morning. Will start Clindamycin today -   4/8- clinically looks better- will resume PSV today as tolerated   4/9- increased respiratory rate and tachycardic on exam.  Patient had just been turned.  This is all likely secondary to pain.  Will treat his pain and then increase his CPAP to 4 hours t.i.d. as tolerated.  4/13- to OR today  Then  -continue PSV as tolerated   4/25 - continuous PSV

## 2022-04-25 NOTE — PROGRESS NOTES
Patient was seen on hemodialysis, he is tolerating this well, we will continue his treatment unchanged.    Assessment/plan 1.  ESRD-continue HD support  2. Hypertension-continue present antihypertensives  3. Diabetes mellitus-continue present hypoglycemic regimen  4. Secondary hyperparathyroidism-continue Renvela  5. Anemia-patient's hematocrit is 20%, defer to primary service regarding transfusion.

## 2022-04-25 NOTE — PROGRESS NOTES
Pharmacokinetic Follow Up: Tobramycin    Assessment of levels:   Level of < 0.3 falls within desired range of </= 2    Regimen Plan:   Tobramycin 240 mg IV x 1 today.  A random tobra level has been ordered for 4/27 at 0600.    Drug levels (last 3 results):  No results for input(s): AMIKACINPEAK, AMIKACINTROU, AMIKACINRAND, AMIKACIN in the last 72 hours.    No results for input(s): GENTAMICIN, GENTPEAK, GENTTROUGH, GENT10, GENT12, GENT8, GENTRANDOM in the last 72 hours.    Recent Labs   Lab Result Units 04/25/22  0527   Tobramycin, Random µg/mL <0.3       Pharmacy will continue to monitor.    Please contact pharmacy at extension [phone] with any questions regarding this assessment.    Patient brief summary:  Og Garcia is a 66 y.o. male initiated on aminoglycoside therapy for treatment of skin & soft tissue infection    Drug Allergies:   Review of patient's allergies indicates:  No Known Allergies    Actual Body Weight:   76.3 kg    Adjust Body Weight:   75.7 kg    Ideal Body Weight:  75.3 kg    Renal Function:   CrCl cannot be calculated (Patient's most recent lab result is older than the maximum 7 days allowed.).,     Dialysis Method (if applicable):  intermittent HD    CBC (last 72 hours):  Recent Labs   Lab Result Units 04/25/22  0527   WBC K/uL 12.75*   Hemoglobin g/dL 6.6*   Hematocrit % 19.7*   Platelet Count K/uL 485*   Lymphocytes % % 15.6*   Monocytes % % 10.6*   Eosinophils % % 2.4   Basophils % % 0.5       Metabolic Panel (last 72 hours):  No results for input(s): SODIUM, POTASSIUM, CHLORIDE, CO2, GLUCOSE, BUN BLD, CREATININE, ALBUMIN, BILIRUBIN TOTAL, ALK PHOS, AST, ALT, MAGNESIUM, PHOSPHORUS in the last 72 hours.    Aminoglycoside Administrations:  aminoglycosides given in last 96 hours                     tobramycin (NEBCIN) 240 mg in dextrose 5 % IVPB (mg) 240 mg New Bag 04/21/22 1508                    Microbiologic Results:  Microbiology Results (last 7 days)       Procedure Component Value Units  Date/Time    Blood culture [[id number]] Collected: 04/21/22 1302    Order Status: Completed Specimen: Blood Updated: 04/25/22 0806     Culture, Blood No Growth At 48 Hours    Blood culture [[id number]] Collected: 04/21/22 1302    Order Status: Completed Specimen: Blood Updated: 04/25/22 0806     Culture, Blood No Growth At 48 Hours

## 2022-04-25 NOTE — SUBJECTIVE & OBJECTIVE
Interval History: awake, alert -BP on low side but had HD this morning.     Objective:     Vital Signs (Most Recent):  Temp: 98 °F (36.7 °C) (04/25/22 0855)  Pulse: 107 (04/25/22 1239)  Resp: (!) 36 (04/25/22 1239)  BP: (!) 86/56 (04/25/22 1155)  SpO2: 100 % (04/25/22 1239)   Vital Signs (24h Range):  Temp:  [97.9 °F (36.6 °C)-98.8 °F (37.1 °C)] 98 °F (36.7 °C)  Pulse:  [] 107  Resp:  [15-38] 36  SpO2:  [90 %-100 %] 100 %  BP: ()/(46-66) 86/56     Weight: 76.3 kg (168 lb 3.4 oz)  Body mass index is 23.46 kg/m².      Intake/Output Summary (Last 24 hours) at 4/25/2022 1308  Last data filed at 4/25/2022 0800  Gross per 24 hour   Intake 1920 ml   Output 500 ml   Net 1420 ml       Physical Exam  Vitals reviewed.   Constitutional:       General: He is not in acute distress.     Comments: Chronically ill appearing   HENT:      Right Ear: External ear normal.      Left Ear: External ear normal.      Mouth/Throat:      Mouth: Mucous membranes are moist.   Eyes:      Pupils: Pupils are equal, round, and reactive to light.   Cardiovascular:      Rate and Rhythm: Normal rate and regular rhythm.   Pulmonary:      Breath sounds: Normal breath sounds. No wheezing, rhonchi or rales.   Abdominal:      Palpations: Abdomen is soft.   Musculoskeletal:         General: Normal range of motion.      Cervical back: Normal range of motion and neck supple.   Skin:     General: Skin is warm and dry.      Capillary Refill: Capillary refill takes less than 2 seconds.   Neurological:      Mental Status: He is alert. Mental status is at baseline.       Vents:  Vent Mode: A/C (PLaced back on A/C to rest nurse informed) (04/25/22 1240)  Ventilator Initiated: No (04/23/22 1934)  Set Rate: 14 BPM (04/25/22 1240)  Vt Set: 480 mL (04/25/22 1240)  Pressure Support: 10 cmH20 (04/25/22 0750)  PEEP/CPAP: 5 cmH20 (04/25/22 1240)  Oxygen Concentration (%): 35 (04/25/22 1240)  Peak Airway Pressure: 23 cmH2O (04/25/22 1240)  Plateau Pressure: 20  cmH20 (03/07/22 0500)  Total Ve: 14 mL (04/25/22 1240)  F/VT Ratio<105 (RSBI): (!) 82.87 (04/25/22 0110)    Lines/Drains/Airways       Central Venous Catheter Line  Duration                  Hemodialysis Catheter 12/30/21 0900 right internal jugular 116 days              Drain  Duration                  Colostomy 12/18/21 1030 Descending/sigmoid  days         Gastrostomy/Enterostomy 03/09/22 1436 Percutaneous endoscopic gastrostomy (PEG) midline feeding 46 days              Airway  Duration                  Surgical Airway 01/04/22 Shiley 111 days              Peripheral Intravenous Line  Duration                  Peripheral IV - Single Lumen 04/11/22 1623 18 G Anterior;Proximal;Right Forearm 13 days                    Significant Labs:    CBC/Anemia Profile:  Recent Labs   Lab 04/25/22  0527   WBC 12.75*   HGB 6.6*   HCT 19.7*   *   MCV 94.3   RDW 15.5*        Chemistries:  No results for input(s): NA, K, CL, CO2, BUN, CREATININE, CALCIUM, ALBUMIN, PROT, BILITOT, ALKPHOS, ALT, AST, GLUCOSE, MG, PHOS in the last 48 hours.    All pertinent labs within the past 24 hours have been reviewed.    Significant Imaging:  I have reviewed all pertinent imaging results/findings within the past 24 hours.

## 2022-04-25 NOTE — PLAN OF CARE
Problem: Adult Inpatient Plan of Care  Goal: Plan of Care Review  Outcome: Ongoing, Progressing  Goal: Patient-Specific Goal (Individualized)  Outcome: Ongoing, Progressing  Goal: Absence of Hospital-Acquired Illness or Injury  Outcome: Ongoing, Progressing  Goal: Optimal Comfort and Wellbeing  Outcome: Ongoing, Progressing

## 2022-04-25 NOTE — ASSESSMENT & PLAN NOTE
Treated anemia   - concern this may be releated to pain and pain medication withdraw - his fentanyl patch was discontinued over the weekend which he has been on for weeks.  -plan - will start PO pain medication q8hrs then decrease to q12 hours after 7 days.   4/8 - improving   4/9-heart rate up on exam.  Secondary to pain.  Will treat p.r.n. pain medication.  4/14- had some SVT yesterday that improved with treating his pain and lopressor  4/22- HR in 110's on ditalizem - will add low dose BB   4/25- HR improved - BB was held due to HD this morning

## 2022-04-25 NOTE — PROGRESS NOTES
[facility]  Pulmonology  Progress Note    Patient Name: Og Garcia  MRN: [id number]  Admission Date: 12/23/2021  Hospital Length of Stay: 123 days  Code Status: Prior  Attending Provider: Cecil Abernathy DO  Primary Care Provider: Hector Arndt DNP, FNP-C   Principal Problem: Denice gangrene    Subjective:     Interval History: awake, alert -BP on low side but had HD this morning.     Objective:     Vital Signs (Most Recent):  Temp: 98 °F (36.7 °C) (04/25/22 0855)  Pulse: 107 (04/25/22 1239)  Resp: (!) 36 (04/25/22 1239)  BP: (!) 86/56 (04/25/22 1155)  SpO2: 100 % (04/25/22 1239)   Vital Signs (24h Range):  Temp:  [97.9 °F (36.6 °C)-98.8 °F (37.1 °C)] 98 °F (36.7 °C)  Pulse:  [] 107  Resp:  [15-38] 36  SpO2:  [90 %-100 %] 100 %  BP: ()/(46-66) 86/56     Weight: 76.3 kg (168 lb 3.4 oz)  Body mass index is 23.46 kg/m².      Intake/Output Summary (Last 24 hours) at 4/25/2022 1308  Last data filed at 4/25/2022 0800  Gross per 24 hour   Intake 1920 ml   Output 500 ml   Net 1420 ml       Physical Exam  Vitals reviewed.   Constitutional:       General: He is not in acute distress.     Comments: Chronically ill appearing   HENT:      Right Ear: External ear normal.      Left Ear: External ear normal.      Mouth/Throat:      Mouth: Mucous membranes are moist.   Eyes:      Pupils: Pupils are equal, round, and reactive to light.   Cardiovascular:      Rate and Rhythm: Normal rate and regular rhythm.   Pulmonary:      Breath sounds: Normal breath sounds. No wheezing, rhonchi or rales.   Abdominal:      Palpations: Abdomen is soft.   Musculoskeletal:         General: Normal range of motion.      Cervical back: Normal range of motion and neck supple.   Skin:     General: Skin is warm and dry.      Capillary Refill: Capillary refill takes less than 2 seconds.   Neurological:      Mental Status: He is alert. Mental status is at baseline.       Vents:  Vent Mode: A/C (PLaced back on  A/C to rest nurse informed) (04/25/22 1240)  Ventilator Initiated: No (04/23/22 1934)  Set Rate: 14 BPM (04/25/22 1240)  Vt Set: 480 mL (04/25/22 1240)  Pressure Support: 10 cmH20 (04/25/22 0750)  PEEP/CPAP: 5 cmH20 (04/25/22 1240)  Oxygen Concentration (%): 35 (04/25/22 1240)  Peak Airway Pressure: 23 cmH2O (04/25/22 1240)  Plateau Pressure: 20 cmH20 (03/07/22 0500)  Total Ve: 14 mL (04/25/22 1240)  F/VT Ratio<105 (RSBI): (!) 82.87 (04/25/22 0110)    Lines/Drains/Airways       Central Venous Catheter Line  Duration                  Hemodialysis Catheter 12/30/21 0900 right internal jugular 116 days              Drain  Duration                  Colostomy 12/18/21 1030 Descending/sigmoid  days         Gastrostomy/Enterostomy 03/09/22 1436 Percutaneous endoscopic gastrostomy (PEG) midline feeding 46 days              Airway  Duration                  Surgical Airway 01/04/22 Shiley 111 days              Peripheral Intravenous Line  Duration                  Peripheral IV - Single Lumen 04/11/22 1623 18 G Anterior;Proximal;Right Forearm 13 days                    Significant Labs:    CBC/Anemia Profile:  Recent Labs   Lab 04/25/22  0527   WBC 12.75*   HGB 6.6*   HCT 19.7*   *   MCV 94.3   RDW 15.5*        Chemistries:  No results for input(s): NA, K, CL, CO2, BUN, CREATININE, CALCIUM, ALBUMIN, PROT, BILITOT, ALKPHOS, ALT, AST, GLUCOSE, MG, PHOS in the last 48 hours.    All pertinent labs within the past 24 hours have been reviewed.    Significant Imaging:  I have reviewed all pertinent imaging results/findings within the past 24 hours.    Assessment/Plan:     Chronic respiratory failure  Was doing trach collar over the weekend but had to be placed back on the vent earlier this week   - will treat his pain- hopefully this will improve his HR and then start back on weaning trails   4/7- aspirated yesterday - febrile this morning. Will start Clindamycin today -   4/8- clinically looks better- will resume PSV  today as tolerated   4/9- increased respiratory rate and tachycardic on exam.  Patient had just been turned.  This is all likely secondary to pain.  Will treat his pain and then increase his CPAP to 4 hours t.i.d. as tolerated.  4/13- to OR today  Then  -continue PSV as tolerated   4/25 - continuous PSV     Tachycardia  Treated anemia   - concern this may be releated to pain and pain medication withdraw - his fentanyl patch was discontinued over the weekend which he has been on for weeks.  -plan - will start PO pain medication q8hrs then decrease to q12 hours after 7 days.   4/8 - improving   4/9-heart rate up on exam.  Secondary to pain.  Will treat p.r.n. pain medication.  4/14- had some SVT yesterday that improved with treating his pain and lopressor  4/22- HR in 110's on ditalizem - will add low dose BB   4/25- HR improved - BB was held due to HD this morning       Pressure ulcer of sacral region, unstageable  Culture and sensitivity reviewed -    Fever   ID note and plan reviewed- ( Blood cultures x2 sets ordered; Empirically start tobramycin, vancomycin, and Zosyn; Depending on cultures we can adjust antibiotic therapy.  If blood cultures come back negative, I would treat with antibiotics for 7-10 days depending on how he does clinically)    ESRD (end stage renal disease)  Started on HD 12/30   Continue with HD per nephrology      Type 2 diabetes mellitus without complication, without long-term current use of insulin  Continue with current care      Acute blood loss anemia  No active bleeding  Hgb 6.0 on 4/11  Patient transfused 1 unit with dialysis yesterday  4/13 -H/H 7.5/22.7  4/15- hgb is 6.2 today. We are going to give another unit of PRBC's today with dialysis  4/16 post transfusion cbc is pending  4/18- hgb is 6.6. Will plan for transfusion with 1 unit of prbc's with next HD  4/21 - 7.2/21.6   4/25- 6.6/19.7 - will transfuse one unit PRBCs with next HD     Necrotizing fasciitis  S/p multiple surgeries    Last debridement for wounds was 4/13                   FLORESITA Shafer-ACNP  Pulmonology  [facility]

## 2022-04-25 NOTE — PROGRESS NOTES
[facility]  Nephrology  Progress Note    Patient Name: Og Garcia  MRN: [id number]  Admission Date: 12/23/2021  Hospital Length of Stay: 123 days  Attending Provider: Cecil Abernathy DO   Primary Care Physician: Hector Arndt DNP, FNP-C  Principal Problem:Denice gangrene    Consults  Subjective:     Interval History: The patient is in no distress    Review of patient's allergies indicates:  No Known Allergies  Current Facility-Administered Medications   Medication Frequency    0.9%  NaCl infusion (for blood administration) Q24H PRN    0.9%  NaCl infusion PRN    acetaminophen tablet 500 mg Q6H PRN    albuterol nebulizer solution 2.5 mg Q4H PRN    albuterol-ipratropium 2.5 mg-0.5 mg/3 mL nebulizer solution 3 mL Q6H    bisacodyL EC tablet 10 mg Daily PRN    calcium gluconate 1 g in dextrose 5 % in water (D5W) 5 % 100 mL IVPB (MB+) Once    dextrose 50% injection 12.5 g PRN    diltiaZEM tablet 90 mg Q6H    glucagon (human recombinant) injection 1 mg PRN    guaiFENesin 100 mg/5 ml syrup 200 mg Q6H    heparin (porcine) injection 4,000 Units PRN    heparin (porcine) injection 5,000 Units Q8H    insulin aspart U-100 injection 1-10 Units Q6H    insulin detemir U-100 injection 25 Units QHS    metoprolol injection 5 mg Q4H PRN    metoprolol tartrate (LOPRESSOR) split tablet 12.5 mg BID    ondansetron injection 8 mg Q6H PRN    pantoprazole suspension 40 mg Daily    piperacillin-tazobactam (ZOSYN) 4.5 g in dextrose 5 % in water (D5W) 5 % 100 mL IVPB (MB+) Q12H    predniSONE tablet 5 mg Daily    sevelamer carbonate pwpk 0.8 g TID WM    silver sulfADIAZINE 1% cream Daily PRN    simethicone chewable tablet 80 mg TID PRN    tobramycin - pharmacy to dose pharmacy to manage frequency    vancomycin - pharmacy to dose pharmacy to manage frequency       Objective:     Vital Signs (Most Recent):  Temp: 97.9 °F (36.6 °C) (04/25/22 0000)  Pulse: 102 (04/25/22 0000)  Resp: (!) 28  (04/25/22 0000)  BP: (!) 109/55 (04/25/22 0000)  SpO2: 100 % (04/25/22 0000)  O2 Device (Oxygen Therapy): CPAP (04/24/22 2000) Vital Signs (24h Range):  Temp:  [97.9 °F (36.6 °C)-98.8 °F (37.1 °C)] 97.9 °F (36.6 °C)  Pulse:  [] 102  Resp:  [14-34] 28  SpO2:  [98 %-100 %] 100 %  BP: ()/(46-70) 109/55     Weight: 76.3 kg (168 lb 3.4 oz) (04/22/22 0349)  Body mass index is 23.46 kg/m².  Body surface area is 1.96 meters squared.    I/O last 3 completed shifts:  In: 1330 [P.O.:480; Other:100; NG/GT:750]  Out: 800 [Stool:800]    Physical Exam  Lungs-clear  Cor-no rub  Abd-soft  Significant Labs:sure  CBC:   Recent Labs   Lab 04/22/22  0255   WBC 19.32*   RBC 2.34*   HGB 7.2*   HCT 21.6*   *   MCV 92.3   MCH 30.8   MCHC 33.3     CMP: No results for input(s): GLU, CALCIUM, ALBUMIN, PROT, NA, K, CO2, CL, BUN, CREATININE, ALKPHOS, ALT, AST, BILITOT in the last 168 hours.  All labs within the past 24 hours have been reviewed.    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] 12/13/2021 Yes    Tachycardia [R00.0] 04/06/2022 No    Chronic respiratory failure [J96.10] 04/06/2022 Yes    Pressure ulcer of sacral region, unstageable [L89.150]  Yes    Fever [R50.9] 02/26/2022 No    ESRD (end stage renal disease) [N18.6] 02/23/2022 Yes    Open wound of right hand without foreign body [S61.401A]  Unknown    Pressure ulcer of left heel, unstageable [L89.620]  Unknown    Acute blood loss anemia [D62] 12/25/2021 No    Type 2 diabetes mellitus without complication, without long-term current use of insulin [E11.9] 12/25/2021 Yes    Necrotizing fasciitis [M72.6]  Yes    Hypertension [I10] 09/02/2021 Yes      Problems Resolved During this Admission:    Diagnosis Date Noted Date Resolved POA    Hypotension [I95.9] 04/10/2022 04/22/2022 Unknown    Vomiting [R11.10] 02/26/2022 03/02/2022 No    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 I will follow-up with patient. Please contact us if you have any additional questions.    Damir Avalos MD  Nephrology  [facility]

## 2022-04-25 NOTE — ASSESSMENT & PLAN NOTE
No active bleeding  Hgb 6.0 on 4/11  Patient transfused 1 unit with dialysis yesterday  4/13 -H/H 7.5/22.7  4/15- hgb is 6.2 today. We are going to give another unit of PRBC's today with dialysis  4/16 post transfusion cbc is pending  4/18- hgb is 6.6. Will plan for transfusion with 1 unit of prbc's with next HD  4/21 - 7.2/21.6   4/25- 6.6/19.7 - will transfuse one unit PRBCs with next HD

## 2022-04-26 NOTE — PROGRESS NOTES
[facility]  Adult Nutrition  Follow-up Note         Reason for Assessment  Reason For Assessment: RD follow-up  Nutrition Risk Screen: (P) tube feeding or parenteral nutrition  Malnutrition  Is Patient Malnourished: No  Nutrition Diagnosis  Increased protein Needs   related to Decreased/ impaired skin integrity as evidenced by wounds    Nutrition Diagnosis Status: Improving      Nutrition Risk  Level of Risk/Frequency of Follow-up: high   Chewing or Swallowing Difficulty?: Swallowing difficulty  Estimated/Assessed Needs  RMR (Brixey-St. Jeor Equation): 1601.13 Activity Factor: 1 Injury Factor: 1.2   Total Ve: 10.8 mL Temp: 98.3 °F (36.8 °C)Axillary  Weight Used For Calorie Calculations: 79.9 kg (176 lb 2.4 oz)   Energy Need Method: [facility] Energy Calorie Requirements (kcal): 1895  Weight Used For Protein Calculations: 83.3 kg (183 lb 10.3 oz)  Protein Requirements: 100-125  Estimated Fluid Requirement Method: RDA Method Fluid Requirements (mL): 2105  RDA Method (mL): 1895     Nutrition Prescription / Recommendations  Recommendation/Intervention: Continue: PEG tube feeding: Nepro @ 60ml/hr; H20 flush of 50ml q 4hr  Goals: pt will meet estimatednutritional needs; wound healing, TF tolerance  Nutrition Goal Status: progressing towards goal  Communication of RD Recs: reviewed with RN  Current Diet Order: PEG tube feeding  Nutrition Order Comments: PEG tube feeding:Nepro @ 60ml/hr; H20 flush of 50ml q 4hr  Current Nutrition Support Formula Ordered: Nepro  Current Nutrition Support Rate Ordered: 60 (ml)  Current Nutrition Support Frequency Ordered: hourly  Recommended Diet: Enteral Nutrition PEG tube feeding: Nepro @ 60ml/hr; H20 flush of 50ml q 4hr   Recommended Oral Supplement: Matthew [90 kcals, 2.5g Protein, 10g Carbs(3g Sugar), 7g L-Arginine, 7g L-Glutamine, Vitamin C 300mg, 9.5mg Zinc] twice daily  Is Nutrition Support Recommended: yes  Is Education Recommended: No  Monitor and  Evaluation  % current Intake: Enteral Nutrition at goal  % intake to meet estimated needs: Enteral Nutrition   Food and Nutrient Intake: enteral nutrition intake  Food and Nutrient Adminstration: enteral and parenteral nutrition administration  Anthropometric Measurements: height/length, weight, weight change, body mass index  Biochemical Data, Medical Tests and Procedures: electrolyte and renal panel, glucose/endocrine profile  Nutrition-Focused Physical Findings: skin  Enteral Calories (kcal): 2592  Enteral Protein (gm): 115  Enteral (Free Water) Fluid (mL): 1037  Free Water Flush Fluid (mL): 300  Parenteral Calories (kcal): 845  Parenteral Protein (gm): 48  Parenteral Fluid (mL): 960  Lipid Calories (kcals): 500 kcals  Other Calories (kcal): 528 (propofol)  Total Calories (kcal): 2160  Total Calories (kcal/kg): 2612  % Kcal Needs: 100  Total Protein (gm): 135  % Protein Needs: 100  IV Fluid (mL): 1764  Total Fluid Intake (mL): 1337  Energy Calories Required: meeting needs  Protein Required: meeting needs  Fluid Required: meeting needs  Tolerance: tolerating  Current Medical Diagnosis and Past Medical History  Diagnosis: gastrointestinal disease, infection/sepsis  Past Medical History:   Diagnosis Date    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    Hyperlipidemia     Hypertension     On mechanically assisted ventilation 12/14/2021     Nutrition/Diet History  Spiritual, Cultural Beliefs, Sikhism Practices, Values that Affect Care: no  Food Allergies: NKFA  Factors Affecting Nutritional Intake: None identified at this time  Lab/Procedures/Meds  Recent Labs   Lab 04/25/22  1132   *   K 3.7   BUN 33*   CREATININE 1.25      CALCIUM 7.6*   CL 97*     Last A1c: No results found for: HGBA1C  Lab Results   Component Value Date    RBC 2.09 (L) 04/25/2022    HGB 6.6 (L) 04/25/2022    HCT 19.7 (L) 04/25/2022    MCV 94.3 04/25/2022    MCH 31.6 (H) 04/25/2022    MCHC 33.5 04/25/2022     Pertinent Labs Reviewed:  "reviewed  Pertinent Labs Comments: Na 133  Pertinent Medications Reviewed: reviewed  Pertinent Medications Comments: heparin, insulin  Anthropometrics  Temp: 98.3 °F (36.8 °C)  Height Method: Stated  Height: 5' 11" (180.3 cm)  Height (inches): 71 in  Weight Method: Bed Scale  Weight: 79.9 kg (176 lb 2.4 oz)  Weight (lb): 176.15 lb  Ideal Body Weight (IBW), Male: 172 lb  % Ideal Body Weight, Male (lb): 106.77 %  BMI (Calculated): 24.6  BMI Grade: 25 - 29.9 - overweight     Nutrition by Nursing  Diet/Nutrition Received: tube feeding  Intake (%): 100%  Diet/Feeding Assistance: total feed  Diet/Feeding Tolerance: other (see comments)  Last Bowel Movement: (P) 04/25/22  [REMOVED]      NG/OG Tube Lafe sump 18 Fr. Left nostril-Feeding Type: continuous, by pump       Gastrostomy/Enterostomy 03/09/22 1436 Percutaneous endoscopic gastrostomy (PEG) midline feeding-Feeding Type: (P) continuous  [REMOVED]      NG/OG Tube Lafe sump 18 Fr. Left nostril-Current Rate (mL/hr): 50 mL/hr       Gastrostomy/Enterostomy 03/09/22 1436 Percutaneous endoscopic gastrostomy (PEG) midline feeding-Current Rate (mL/hr): 60 mL/hr  [REMOVED]      NG/OG Tube Lafe sump 18 Fr. Left nostril-Goal Rate (mL/hr): 75 mL/hr       Gastrostomy/Enterostomy 03/09/22 1436 Percutaneous endoscopic gastrostomy (PEG) midline feeding-Goal Rate (mL/hr): 60 mL/hr  [REMOVED]      NG/OG Tube Lafe sump 18 Fr. Left nostril-Formula Name: nepro 1.8       Gastrostomy/Enterostomy 03/09/22 1436 Percutaneous endoscopic gastrostomy (PEG) midline feeding-Formula Name: (P) Nepro 1.8  Nutrition Follow-Up  RD Follow-up?: Yes  Assessment and Plan  No new Assessment & Plan notes have been filed under this hospital service since the last note was generated.  Service: Nutrition     "

## 2022-04-26 NOTE — PROGRESS NOTES
[facility]  Pulmonology  Progress Note    Patient Name: Og Garcia  MRN: [id number]  Admission Date: 12/23/2021  Hospital Length of Stay: 124 days  Code Status: Prior  Attending Provider: Cecil Abernathy DO  Primary Care Provider: Hector Arndt DNP, FNP-C   Principal Problem: Denice gangrene    Subjective:     Interval History: No acute events overnight. The patient is currently resting comfortably. He is currently afebrile this and vital signs are stable. He is oxygenating adequately.    Objective:     Vital Signs (Most Recent):  Temp: 98.3 °F (36.8 °C) (04/26/22 0730)  Pulse: 98 (04/26/22 1207)  Resp: (!) 31 (04/26/22 1207)  BP: 121/62 (04/26/22 1145)  SpO2: 100 % (04/26/22 1207)   Vital Signs (24h Range):  Temp:  [98.3 °F (36.8 °C)-99.2 °F (37.3 °C)] 98.3 °F (36.8 °C)  Pulse:  [] 98  Resp:  [16-42] 31  SpO2:  [74 %-100 %] 100 %  BP: ()/(46-66) 121/62     Weight: 79.9 kg (176 lb 2.4 oz)  Body mass index is 24.57 kg/m².      Intake/Output Summary (Last 24 hours) at 4/26/2022 1243  Last data filed at 4/26/2022 0800  Gross per 24 hour   Intake 1330 ml   Output 300 ml   Net 1030 ml         Physical Exam  Vitals reviewed.   Constitutional:       General: He is not in acute distress.     Appearance: He is ill-appearing.      Comments: Chronically ill appearing   HENT:      Right Ear: External ear normal.      Left Ear: External ear normal.      Mouth/Throat:      Mouth: Mucous membranes are moist.   Eyes:      Pupils: Pupils are equal, round, and reactive to light.   Cardiovascular:      Rate and Rhythm: Normal rate and regular rhythm.   Pulmonary:      Breath sounds: Normal breath sounds. No wheezing, rhonchi or rales.   Abdominal:      Palpations: Abdomen is soft.   Musculoskeletal:         General: Normal range of motion.      Cervical back: Normal range of motion and neck supple.   Skin:     General: Skin is warm and dry.      Capillary Refill: Capillary refill takes  less than 2 seconds.   Neurological:      Mental Status: He is alert. Mental status is at baseline.       Vents:  Vent Mode: CPAP/PSV (04/26/22 0741)  Ventilator Initiated: No (04/23/22 1934)  Set Rate: 14 BPM (04/25/22 1240)  Vt Set: 480 mL (04/25/22 1240)  Pressure Support: 10 cmH20 (04/26/22 0730)  PEEP/CPAP: 5 cmH20 (04/26/22 0730)  Oxygen Concentration (%): 35 (04/26/22 0741)  Peak Airway Pressure: 16 cmH2O (04/26/22 0730)  Plateau Pressure: 20 cmH20 (03/07/22 0500)  Total Ve: 15.9 mL (04/26/22 0730)  F/VT Ratio<105 (RSBI): (!) 37.86 (04/26/22 0730)    Lines/Drains/Airways       Central Venous Catheter Line  Duration                  Hemodialysis Catheter 12/30/21 0900 right internal jugular 117 days              Drain  Duration                  Colostomy 12/18/21 1030 Descending/sigmoid  days         Gastrostomy/Enterostomy 03/09/22 1436 Percutaneous endoscopic gastrostomy (PEG) midline feeding 47 days              Airway  Duration                  Surgical Airway 01/04/22 Shiley 112 days              Peripheral Intravenous Line  Duration                  Peripheral IV - Single Lumen 04/11/22 1623 18 G Anterior;Proximal;Right Forearm 14 days                    Significant Labs:    CBC/Anemia Profile:  Recent Labs   Lab 04/25/22  0527   WBC 12.75*   HGB 6.6*   HCT 19.7*   *   MCV 94.3   RDW 15.5*          Chemistries:  Recent Labs   Lab 04/25/22  1132   *   K 3.7   CL 97*   CO2 27   BUN 33*   CREATININE 1.25   CALCIUM 7.6*       All pertinent labs within the past 24 hours have been reviewed.    Significant Imaging:  I have reviewed all pertinent imaging results/findings within the past 24 hours.    Assessment/Plan:     * Denice gangrene  - s/p multiple debridements  - 2/21 back to OR for skin grafting  - 3/9 had peg tube placed           Chronic respiratory failure  Was doing trach collar over the weekend but had to be placed back on the vent earlier this week   - will treat his pain-  hopefully this will improve his HR and then start back on weaning trails   4/7- aspirated yesterday - febrile this morning. Will start Clindamycin today -   4/8- clinically looks better- will resume PSV today as tolerated   4/9- increased respiratory rate and tachycardic on exam.  Patient had just been turned.  This is all likely secondary to pain.  Will treat his pain and then increase his CPAP to 4 hours t.i.d. as tolerated.  4/13- to OR today  Then  -continue PSV as tolerated   4/26 - remains on continuous PSV and now doing well    Tachycardia  Treated anemia   - concern this may be releated to pain and pain medication withdraw - his fentanyl patch was discontinued over the weekend which he has been on for weeks.  -plan - will start PO pain medication q8hrs then decrease to q12 hours after 7 days.   4/8 - improving   4/9-heart rate up on exam.  Secondary to pain.  Will treat p.r.n. pain medication.  4/14- had some SVT yesterday that improved with treating his pain and lopressor  4/22- HR in 110's on ditalizem - will add low dose BB   4/25- HR improved - BB was held due to HD this morning       Pressure ulcer of sacral region, unstageable  Culture and sensitivity reviewed -    Fever   ID note and plan reviewed- ( Blood cultures x2 sets ordered; Empirically start tobramycin, vancomycin, and Zosyn; Depending on cultures we can adjust antibiotic therapy.  If blood cultures come back negative, I would treat with antibiotics for 7-10 days depending on how he does clinically)    ESRD (end stage renal disease)  Started on HD 12/30   Continue with HD per nephrology      Type 2 diabetes mellitus without complication, without long-term current use of insulin  Continue with current care  Blood sugar adequately controlled    Acute blood loss anemia  No active bleeding  Hgb 6.0 on 4/11  Patient transfused 1 unit with dialysis yesterday  4/13 -H/H 7.5/22.7  4/15- hgb is 6.2 today. We are going to give another unit of PRBC's  today with dialysis  4/16 post transfusion cbc is pending  4/18- hgb is 6.6. Will plan for transfusion with 1 unit of prbc's with next HD  4/21 - 7.2/21.6   4/25- 6.6/19.7 - will transfuse one unit PRBCs with next HD     Necrotizing fasciitis  S/p multiple surgeries   Last debridement for wounds was 4/13      Hypertension  BP currently ok                   Cecil Abernathy, DO  Pulmonology  [facility]

## 2022-04-26 NOTE — SUBJECTIVE & OBJECTIVE
Interval History: No acute events overnight. The patient is currently resting comfortably. He is currently afebrile this and vital signs are stable. He is oxygenating adequately.    Objective:     Vital Signs (Most Recent):  Temp: 98.3 °F (36.8 °C) (04/26/22 0730)  Pulse: 98 (04/26/22 1207)  Resp: (!) 31 (04/26/22 1207)  BP: 121/62 (04/26/22 1145)  SpO2: 100 % (04/26/22 1207)   Vital Signs (24h Range):  Temp:  [98.3 °F (36.8 °C)-99.2 °F (37.3 °C)] 98.3 °F (36.8 °C)  Pulse:  [] 98  Resp:  [16-42] 31  SpO2:  [74 %-100 %] 100 %  BP: ()/(46-66) 121/62     Weight: 79.9 kg (176 lb 2.4 oz)  Body mass index is 24.57 kg/m².      Intake/Output Summary (Last 24 hours) at 4/26/2022 1243  Last data filed at 4/26/2022 0800  Gross per 24 hour   Intake 1330 ml   Output 300 ml   Net 1030 ml         Physical Exam  Vitals reviewed.   Constitutional:       General: He is not in acute distress.     Appearance: He is ill-appearing.      Comments: Chronically ill appearing   HENT:      Right Ear: External ear normal.      Left Ear: External ear normal.      Mouth/Throat:      Mouth: Mucous membranes are moist.   Eyes:      Pupils: Pupils are equal, round, and reactive to light.   Cardiovascular:      Rate and Rhythm: Normal rate and regular rhythm.   Pulmonary:      Breath sounds: Normal breath sounds. No wheezing, rhonchi or rales.   Abdominal:      Palpations: Abdomen is soft.   Musculoskeletal:         General: Normal range of motion.      Cervical back: Normal range of motion and neck supple.   Skin:     General: Skin is warm and dry.      Capillary Refill: Capillary refill takes less than 2 seconds.   Neurological:      Mental Status: He is alert. Mental status is at baseline.       Vents:  Vent Mode: CPAP/PSV (04/26/22 0741)  Ventilator Initiated: No (04/23/22 1934)  Set Rate: 14 BPM (04/25/22 1240)  Vt Set: 480 mL (04/25/22 1240)  Pressure Support: 10 cmH20 (04/26/22 0730)  PEEP/CPAP: 5 cmH20 (04/26/22 0730)  Oxygen  Concentration (%): 35 (04/26/22 0741)  Peak Airway Pressure: 16 cmH2O (04/26/22 0730)  Plateau Pressure: 20 cmH20 (03/07/22 0500)  Total Ve: 15.9 mL (04/26/22 0730)  F/VT Ratio<105 (RSBI): (!) 37.86 (04/26/22 0730)    Lines/Drains/Airways       Central Venous Catheter Line  Duration                  Hemodialysis Catheter 12/30/21 0900 right internal jugular 117 days              Drain  Duration                  Colostomy 12/18/21 1030 Descending/sigmoid  days         Gastrostomy/Enterostomy 03/09/22 1436 Percutaneous endoscopic gastrostomy (PEG) midline feeding 47 days              Airway  Duration                  Surgical Airway 01/04/22 Shiley 112 days              Peripheral Intravenous Line  Duration                  Peripheral IV - Single Lumen 04/11/22 1623 18 G Anterior;Proximal;Right Forearm 14 days                    Significant Labs:    CBC/Anemia Profile:  Recent Labs   Lab 04/25/22  0527   WBC 12.75*   HGB 6.6*   HCT 19.7*   *   MCV 94.3   RDW 15.5*          Chemistries:  Recent Labs   Lab 04/25/22  1132   *   K 3.7   CL 97*   CO2 27   BUN 33*   CREATININE 1.25   CALCIUM 7.6*       All pertinent labs within the past 24 hours have been reviewed.    Significant Imaging:  I have reviewed all pertinent imaging results/findings within the past 24 hours.

## 2022-04-26 NOTE — ASSESSMENT & PLAN NOTE
Was doing trach collar over the weekend but had to be placed back on the vent earlier this week   - will treat his pain- hopefully this will improve his HR and then start back on weaning trails   4/7- aspirated yesterday - febrile this morning. Will start Clindamycin today -   4/8- clinically looks better- will resume PSV today as tolerated   4/9- increased respiratory rate and tachycardic on exam.  Patient had just been turned.  This is all likely secondary to pain.  Will treat his pain and then increase his CPAP to 4 hours t.i.d. as tolerated.  4/13- to OR today  Then  -continue PSV as tolerated   4/26 - remains on continuous PSV and now doing well

## 2022-04-27 NOTE — PROGRESS NOTES
Patient is seen on hemodialysis, he is tolerating this well.  We will continue his treatment unchanged.

## 2022-04-27 NOTE — PROCEDURES
"Og Garcia is a 66 y.o. male patient.    Temp: 98.4 °F (36.9 °C) (04/27/22 0730)  Pulse: 99 (04/27/22 0845)  Resp: (!) 32 (04/27/22 0845)  BP: 117/61 (04/27/22 0845)  SpO2: 100 % (04/27/22 0845)  Weight: 76.3 kg (168 lb 3.4 oz) (04/27/22 0529)  Height: 5' 11" (180.3 cm) (04/26/22 1012)     Leaking cuff on #8 trach oxygenating well   patient on CPAP  Procedures  RT at bedside,  Bulb deflated  Passed #14 catheter  Removed #8 trach with slight difficulty  Inserted #8 XLT trach over suction catheter  Catheter removed  Inflated cuff to 30mm Hg of pressure  Suctioned thick yellow/bloodt tinged secretions  02 sats remained 100% during procedure  4/27/2022  "

## 2022-04-27 NOTE — ASSESSMENT & PLAN NOTE
Was doing trach collar over the weekend but had to be placed back on the vent earlier this week   - will treat his pain- hopefully this will improve his HR and then start back on weaning trails   4/7- aspirated yesterday - febrile this morning. Will start Clindamycin today -   4/8- clinically looks better- will resume PSV today as tolerated   4/9- increased respiratory rate and tachycardic on exam.  Patient had just been turned.  This is all likely secondary to pain.  Will treat his pain and then increase his CPAP to 4 hours t.i.d. as tolerated.  4/13- to OR today  Then  -continue PSV as tolerated   4/26 - remains on continuous PSV and now doing well  4/27- once trach is changed out we can start some trach collar

## 2022-04-27 NOTE — PLAN OF CARE
Problem: Communication Impairment (Artificial Airway)  Goal: Effective Communication  Outcome: Ongoing, Progressing     Problem: Device-Related Complication Risk (Artificial Airway)  Goal: Optimal Device Function  Outcome: Ongoing, Progressing

## 2022-04-27 NOTE — PROGRESS NOTES
Patient is resting comfortably.  He is poorly responsive    Physical exam general patient is chronically ill-appearing, heart is regular rate and rhythm, he has no pitting edema, lungs are clear to auscultation anteriorly, abdomen is soft with decreased bowel sounds.    Assessment/plan 1.  ESRD-continue HD support  2. Hypertension-continue present antihypertensives  3. Diabetes mellitus-patient's sugars look running less than 150 almost checks.    4. Secondary hyperparathyroidism-continue Renvela  5. Anemia-patient's hematocrit is 20%, and stable

## 2022-04-27 NOTE — PROGRESS NOTES
Pharmacokinetic Assessment Follow Up: IV Vancomycin    Vancomycin serum concentration assessment(s):    The random level was drawn correctly and can be used to guide therapy at this time. The measurement is within the desired definitive target range of < 20    Vancomycin Regimen Plan:    Vanc 1500 mg IV x 1 today.  A random vanc level has been ordered for 4/29 at 0600.    Drug levels (last 3 results):  Recent Labs   Lab Result Units 04/27/22  0437   Vancomycin, Random µg/mL 12.2       Pharmacy will continue to follow and monitor vancomycin.    Please contact pharmacy at extension [phone] for questions regarding this assessment.    Patient brief summary:  Og Garcia is a 66 y.o. male initiated on antimicrobial therapy with IV Vancomycin for treatment of skin & soft tissue infection    Drug Allergies:   Review of patient's allergies indicates:  No Known Allergies    Actual Body Weight:   76.3 kg    Renal Function:   Estimated Creatinine Clearance: 61.9 mL/min (based on SCr of 1.25 mg/dL).,     Dialysis Method (if applicable):  intermittent HD    CBC (last 72 hours):  Recent Labs   Lab Result Units 04/25/22  0527   WBC K/uL 12.75*   Hemoglobin g/dL 6.6*   Hematocrit % 19.7*   Platelet Count K/uL 485*   Lymphocytes % % 15.6*   Monocytes % % 10.6*   Eosinophils % % 2.4   Basophils % % 0.5       Metabolic Panel (last 72 hours):  Recent Labs   Lab Result Units 04/25/22  1132   Sodium mmol/L 134*   Potassium mmol/L 3.7   Chloride mmol/L 97*   CO2 mmol/L 27   Glucose mg/dL 101   BUN mg/dL 33*   Creatinine mg/dL 1.25       Vancomycin Administrations:  vancomycin given in the last 96 hours        No antibiotic orders with administrations found.                    Microbiologic Results:  Microbiology Results (last 7 days)       Procedure Component Value Units Date/Time    Blood culture [[id number]] Collected: 04/21/22 1302    Order Status: Completed Specimen: Blood Updated: 04/27/22 0753     Culture, Blood No Growth At 48  Hours    Blood culture [[id number]] Collected: 04/21/22 1302    Order Status: Completed Specimen: Blood Updated: 04/27/22 0755     Culture, Blood No Growth At 48 Hours

## 2022-04-27 NOTE — PROGRESS NOTES
Pharmacokinetic Follow Up: Tobramycin    Assessment of levels:   Random of 1.2 falls within the desired random range of </= 2    Regimen Plan:   Tobramycin 240 mg IV x 1 today.  A random tobra level has been ordered for 4/29 at 0600.    Drug levels (last 3 results):  No results for input(s): AMIKACINPEAK, AMIKACINTROU, AMIKACINRAND, AMIKACIN in the last 72 hours.    No results for input(s): GENTAMICIN, GENTPEAK, GENTTROUGH, GENT10, GENT12, GENT8, GENTRANDOM in the last 72 hours.    Recent Labs   Lab Result Units 04/25/22  0527 04/27/22  0437   Tobramycin, Random µg/mL <0.3 1.2       Pharmacy will continue to monitor.    Please contact pharmacy at extension [phone] with any questions regarding this assessment.    Patient brief summary:  Og Garcia is a 66 y.o. male initiated on aminoglycoside therapy for treatment of skin & soft tissue infection    Drug Allergies:   Review of patient's allergies indicates:  No Known Allergies    Actual Body Weight:   76.3 kg    Adjust Body Weight:   75.7 kg    Ideal Body Weight:  75.3 kg    Renal Function:   Estimated Creatinine Clearance: 61.9 mL/min (based on SCr of 1.25 mg/dL).,     Dialysis Method (if applicable):  intermittent HD    CBC (last 72 hours):  Recent Labs   Lab Result Units 04/25/22  0527   WBC K/uL 12.75*   Hemoglobin g/dL 6.6*   Hematocrit % 19.7*   Platelet Count K/uL 485*   Lymphocytes % % 15.6*   Monocytes % % 10.6*   Eosinophils % % 2.4   Basophils % % 0.5       Metabolic Panel (last 72 hours):  Recent Labs   Lab Result Units 04/25/22  1132   Sodium mmol/L 134*   Potassium mmol/L 3.7   Chloride mmol/L 97*   CO2 mmol/L 27   Glucose mg/dL 101   BUN mg/dL 33*   Creatinine mg/dL 1.25       Aminoglycoside Administrations:  aminoglycosides given in last 96 hours                     tobramycin (NEBCIN) 240 mg in dextrose 5 % IVPB (mg) 240 mg New Bag 04/25/22 1521                    Microbiologic Results:  Microbiology Results (last 7 days)       Procedure Component  Value Units Date/Time    Blood culture [[id number]] Collected: 04/21/22 1302    Order Status: Completed Specimen: Blood Updated: 04/27/22 0754     Culture, Blood No Growth At 48 Hours    Blood culture [[id number]] Collected: 04/21/22 1302    Order Status: Completed Specimen: Blood Updated: 04/27/22 0754     Culture, Blood No Growth At 48 Hours

## 2022-04-27 NOTE — PROGRESS NOTES
[facility]  Pulmonology  Progress Note    Patient Name: Og Garcia  MRN: [id number]  Admission Date: 12/23/2021  Hospital Length of Stay: 125 days  Code Status: Prior  Attending Provider: Cecil Abernathy DO  Primary Care Provider: Hector Arndt DNP, FNP-C   Principal Problem: Denice gangrene    Subjective:     Interval History: No acute events overnight. The patient is currently resting comfortably. He is currently afebrile this and vital signs are stable. He is oxygenating adequately.    Objective:     Vital Signs (Most Recent):  Temp: 98.4 °F (36.9 °C) (04/27/22 0730)  Pulse: 94 (04/27/22 0758)  Resp: (!) 30 (04/27/22 0758)  BP: 130/65 (04/27/22 0630)  SpO2: 100 % (04/27/22 0758)   Vital Signs (24h Range):  Temp:  [98.3 °F (36.8 °C)-100 °F (37.8 °C)] 98.4 °F (36.9 °C)  Pulse:  [] 94  Resp:  [12-42] 30  SpO2:  [92 %-100 %] 100 %  BP: ()/(45-67) 130/65     Weight: 76.3 kg (168 lb 3.4 oz)  Body mass index is 23.46 kg/m².      Intake/Output Summary (Last 24 hours) at 4/27/2022 0817  Last data filed at 4/27/2022 0658  Gross per 24 hour   Intake 1270 ml   Output 400 ml   Net 870 ml         Physical Exam  Vitals reviewed.   Constitutional:       General: He is not in acute distress.     Appearance: He is ill-appearing.      Comments: Chronically ill appearing   HENT:      Right Ear: External ear normal.      Left Ear: External ear normal.      Mouth/Throat:      Mouth: Mucous membranes are moist.   Eyes:      Pupils: Pupils are equal, round, and reactive to light.   Cardiovascular:      Rate and Rhythm: Normal rate and regular rhythm.   Pulmonary:      Breath sounds: Normal breath sounds. No wheezing, rhonchi or rales.   Abdominal:      Palpations: Abdomen is soft.   Musculoskeletal:         General: Normal range of motion.      Cervical back: Normal range of motion and neck supple.   Skin:     General: Skin is warm and dry.      Capillary Refill: Capillary refill takes  less than 2 seconds.   Neurological:      Mental Status: He is alert. Mental status is at baseline.       Vents:  Vent Mode: CPAP / PSV (04/27/22 0427)  Ventilator Initiated: No (04/23/22 1934)  Set Rate: 14 BPM (04/25/22 1240)  Vt Set: 480 mL (04/25/22 1240)  Pressure Support: 10 cmH20 (04/27/22 0427)  PEEP/CPAP: 5 cmH20 (04/27/22 0427)  Oxygen Concentration (%): 35 (04/27/22 0427)  Peak Airway Pressure: 16 cmH2O (04/27/22 0758)  Plateau Pressure: 20 cmH20 (03/07/22 0500)  Total Ve: 9.3 mL (04/27/22 0758)  F/VT Ratio<105 (RSBI): (!) 82.87 (04/27/22 0758)    Lines/Drains/Airways       Central Venous Catheter Line  Duration                  Hemodialysis Catheter 12/30/21 0900 right internal jugular 117 days              Drain  Duration                  Colostomy 12/18/21 1030 Descending/sigmoid  days         Gastrostomy/Enterostomy 03/09/22 1436 Percutaneous endoscopic gastrostomy (PEG) midline feeding 48 days              Airway  Duration                  Surgical Airway 01/04/22 Shiley 113 days              Peripheral Intravenous Line  Duration                  Peripheral IV - Single Lumen 04/11/22 1623 18 G Anterior;Proximal;Right Forearm 15 days                    Significant Labs:    CBC/Anemia Profile:  No results for input(s): WBC, HGB, HCT, PLT, MCV, RDW, IRON, FERRITIN, RETIC, FOLATE, [id number], OCCULTBLOOD in the last 48 hours.       Chemistries:  Recent Labs   Lab 04/25/22  1132   *   K 3.7   CL 97*   CO2 27   BUN 33*   CREATININE 1.25   CALCIUM 7.6*         All pertinent labs within the past 24 hours have been reviewed.    Significant Imaging:  I have reviewed all pertinent imaging results/findings within the past 24 hours.    Assessment/Plan:     Chronic respiratory failure  Was doing trach collar over the weekend but had to be placed back on the vent earlier this week   - will treat his pain- hopefully this will improve his HR and then start back on weaning trails   4/7- aspirated  yesterday - febrile this morning. Will start Clindamycin today -   4/8- clinically looks better- will resume PSV today as tolerated   4/9- increased respiratory rate and tachycardic on exam.  Patient had just been turned.  This is all likely secondary to pain.  Will treat his pain and then increase his CPAP to 4 hours t.i.d. as tolerated.  4/13- to OR today  Then  -continue PSV as tolerated   4/26 - remains on continuous PSV and now doing well  4/27- once trach is changed out we can start some trach collar    Tachycardia  Treated anemia   - concern this may be releated to pain and pain medication withdraw - his fentanyl patch was discontinued over the weekend which he has been on for weeks.  -plan - will start PO pain medication q8hrs then decrease to q12 hours after 7 days.   4/8 - improving   4/9-heart rate up on exam.  Secondary to pain.  Will treat p.r.n. pain medication.  4/14- had some SVT yesterday that improved with treating his pain and lopressor  4/22- HR in 110's on ditalizem - will add low dose BB   4/25- HR improved - BB was held due to HD this morning       Pressure ulcer of sacral region, unstageable  Culture and sensitivity reviewed -    Fever   ID note and plan reviewed- ( Blood cultures x2 sets ordered; Empirically start tobramycin, vancomycin, and Zosyn; Depending on cultures we can adjust antibiotic therapy.  If blood cultures come back negative, I would treat with antibiotics for 7-10 days depending on how he does clinically)    ESRD (end stage renal disease)  Started on HD 12/30   Continue with HD per nephrology      Type 2 diabetes mellitus without complication, without long-term current use of insulin  Continue with current care  Blood sugar adequately controlled    Acute blood loss anemia  No active bleeding  Hgb 6.0 on 4/11  Patient transfused 1 unit with dialysis yesterday  4/13 -H/H 7.5/22.7  4/15- hgb is 6.2 today. We are going to give another unit of PRBC's today with dialysis  4/16  post transfusion cbc is pending  4/18- hgb is 6.6. Will plan for transfusion with 1 unit of prbc's with next HD  4/21 - 7.2/21.6   4/25- 6.6/19.7 - will transfuse one unit PRBCs with next HD     Necrotizing fasciitis  S/p multiple surgeries   Last debridement for wounds was 4/13      Hypertension  BP currently ok                   Cecil Abernathy, DO  Pulmonology  [facility]

## 2022-04-27 NOTE — SUBJECTIVE & OBJECTIVE
Interval History: No acute events overnight. The patient is currently resting comfortably. He is currently afebrile this and vital signs are stable. He is oxygenating adequately.    Objective:     Vital Signs (Most Recent):  Temp: 98.4 °F (36.9 °C) (04/27/22 0730)  Pulse: 94 (04/27/22 0758)  Resp: (!) 30 (04/27/22 0758)  BP: 130/65 (04/27/22 0630)  SpO2: 100 % (04/27/22 0758)   Vital Signs (24h Range):  Temp:  [98.3 °F (36.8 °C)-100 °F (37.8 °C)] 98.4 °F (36.9 °C)  Pulse:  [] 94  Resp:  [12-42] 30  SpO2:  [92 %-100 %] 100 %  BP: ()/(45-67) 130/65     Weight: 76.3 kg (168 lb 3.4 oz)  Body mass index is 23.46 kg/m².      Intake/Output Summary (Last 24 hours) at 4/27/2022 0817  Last data filed at 4/27/2022 0658  Gross per 24 hour   Intake 1270 ml   Output 400 ml   Net 870 ml         Physical Exam  Vitals reviewed.   Constitutional:       General: He is not in acute distress.     Appearance: He is ill-appearing.      Comments: Chronically ill appearing   HENT:      Right Ear: External ear normal.      Left Ear: External ear normal.      Mouth/Throat:      Mouth: Mucous membranes are moist.   Eyes:      Pupils: Pupils are equal, round, and reactive to light.   Cardiovascular:      Rate and Rhythm: Normal rate and regular rhythm.   Pulmonary:      Breath sounds: Normal breath sounds. No wheezing, rhonchi or rales.   Abdominal:      Palpations: Abdomen is soft.   Musculoskeletal:         General: Normal range of motion.      Cervical back: Normal range of motion and neck supple.   Skin:     General: Skin is warm and dry.      Capillary Refill: Capillary refill takes less than 2 seconds.   Neurological:      Mental Status: He is alert. Mental status is at baseline.       Vents:  Vent Mode: CPAP / PSV (04/27/22 0427)  Ventilator Initiated: No (04/23/22 1934)  Set Rate: 14 BPM (04/25/22 1240)  Vt Set: 480 mL (04/25/22 1240)  Pressure Support: 10 cmH20 (04/27/22 0427)  PEEP/CPAP: 5 cmH20 (04/27/22 0427)  Oxygen  Concentration (%): 35 (04/27/22 0427)  Peak Airway Pressure: 16 cmH2O (04/27/22 0758)  Plateau Pressure: 20 cmH20 (03/07/22 0500)  Total Ve: 9.3 mL (04/27/22 0758)  F/VT Ratio<105 (RSBI): (!) 82.87 (04/27/22 0758)    Lines/Drains/Airways       Central Venous Catheter Line  Duration                  Hemodialysis Catheter 12/30/21 0900 right internal jugular 117 days              Drain  Duration                  Colostomy 12/18/21 1030 Descending/sigmoid  days         Gastrostomy/Enterostomy 03/09/22 1436 Percutaneous endoscopic gastrostomy (PEG) midline feeding 48 days              Airway  Duration                  Surgical Airway 01/04/22 Shiley 113 days              Peripheral Intravenous Line  Duration                  Peripheral IV - Single Lumen 04/11/22 1623 18 G Anterior;Proximal;Right Forearm 15 days                    Significant Labs:    CBC/Anemia Profile:  No results for input(s): WBC, HGB, HCT, PLT, MCV, RDW, IRON, FERRITIN, RETIC, FOLATE, [id number], OCCULTBLOOD in the last 48 hours.       Chemistries:  Recent Labs   Lab 04/25/22  1132   *   K 3.7   CL 97*   CO2 27   BUN 33*   CREATININE 1.25   CALCIUM 7.6*         All pertinent labs within the past 24 hours have been reviewed.    Significant Imaging:  I have reviewed all pertinent imaging results/findings within the past 24 hours.

## 2022-04-28 NOTE — ASSESSMENT & PLAN NOTE
Treated anemia   - concern this may be releated to pain and pain medication withdraw - his fentanyl patch was discontinued over the weekend which he has been on for weeks.  -plan - will start PO pain medication q8hrs then decrease to q12 hours after 7 days.   4/8 - improving   4/9-heart rate up on exam.  Secondary to pain.  Will treat p.r.n. pain medication.  4/14- had some SVT yesterday that improved with treating his pain and lopressor  4/22- HR in 110's on ditalizem - will add low dose BB   4/25- HR improved - BB was held due to HD this morning   4/28- HR around 100 today

## 2022-04-28 NOTE — PROGRESS NOTES
Patient is resting comfortably.  He is poorly responsive    Physical exam general patient is chronically ill-appearing, heart is regular rate and rhythm, he has no pitting edema, lungs are clear to auscultation anteriorly, abdomen is soft with decreased bowel sounds.    Assessment/plan 1.  ESRD-continue HD support  2. Hypertension-continue present antihypertensives patient's systolic blood pressures around 107 today  3. Diabetes mellitus-patient's glucose was around 77 prior to lunch    4. Secondary hyperparathyroidism-continue Renvela  5. Anemia-patient's hematocrit is 20%, and stable

## 2022-04-28 NOTE — PROGRESS NOTES
[facility]  Pulmonology  Progress Note    Patient Name: Og Garcia  MRN: [id number]  Admission Date: 12/23/2021  Hospital Length of Stay: 126 days  Code Status: Prior  Attending Provider: Cecil Abernathy DO  Primary Care Provider: Hector Arndt DNP, FNP-C   Principal Problem: Denice gangrene    Subjective:     Interval History: No acute events overnight. The patient is currently resting comfortably. Hemodynamically stable. Tmax of 101 this am. He is oxygenating adequately.    Objective:     Vital Signs (Most Recent):  Temp: (!) 101.1 °F (38.4 °C) (04/28/22 1141)  Pulse: 98 (04/28/22 1130)  Resp: (!) 37 (04/28/22 1130)  BP: (!) 112/55 (04/28/22 1130)  SpO2: 100 % (04/28/22 1130)   Vital Signs (24h Range):  Temp:  [98.4 °F (36.9 °C)-101.1 °F (38.4 °C)] 101.1 °F (38.4 °C)  Pulse:  [] 98  Resp:  [14-42] 37  SpO2:  [99 %-100 %] 100 %  BP: ()/(43-61) 112/55     Weight: 78.2 kg (172 lb 6.4 oz)  Body mass index is 24.04 kg/m².      Intake/Output Summary (Last 24 hours) at 4/28/2022 1147  Last data filed at 4/28/2022 0900  Gross per 24 hour   Intake 2348.33 ml   Output 917 ml   Net 1431.33 ml         Physical Exam  Vitals reviewed.   Constitutional:       General: He is not in acute distress.     Appearance: He is ill-appearing.      Comments: Chronically ill appearing   HENT:      Right Ear: External ear normal.      Left Ear: External ear normal.      Mouth/Throat:      Mouth: Mucous membranes are moist.   Eyes:      Pupils: Pupils are equal, round, and reactive to light.   Cardiovascular:      Rate and Rhythm: Normal rate and regular rhythm.   Pulmonary:      Breath sounds: Normal breath sounds. No wheezing, rhonchi or rales.   Abdominal:      Palpations: Abdomen is soft.   Musculoskeletal:         General: Normal range of motion.      Cervical back: Normal range of motion and neck supple.   Skin:     General: Skin is warm and dry.      Capillary Refill: Capillary refill  takes less than 2 seconds.   Neurological:      Mental Status: He is alert. Mental status is at baseline.       Vents:  Vent Mode: CPAP PSV (04/28/22 0831)  Ventilator Initiated: No (04/23/22 1934)  Set Rate: 0 BPM (04/28/22 0548)  Vt Set: 0 mL (04/28/22 0548)  Pressure Support: 10 cmH20 (04/28/22 0831)  PEEP/CPAP: 5 cmH20 (04/28/22 0831)  Oxygen Concentration (%): 35 (04/28/22 0811)  Peak Airway Pressure: 16 cmH2O (04/28/22 0831)  Plateau Pressure: 20 cmH20 (03/07/22 0500)  Total Ve: 13.2 mL (04/28/22 0831)  F/VT Ratio<105 (RSBI): (!) 71.96 (04/28/22 0022)    Lines/Drains/Airways       Central Venous Catheter Line  Duration                  Hemodialysis Catheter 12/30/21 0900 right internal jugular 119 days              Drain  Duration                  Colostomy 12/18/21 1030 Descending/sigmoid  days         Gastrostomy/Enterostomy 03/09/22 1436 Percutaneous endoscopic gastrostomy (PEG) midline feeding 49 days              Airway  Duration                  Surgical Airway 04/27/22 0856 Shiley;Other (Comment) Cuffed;Long 1 day              Peripheral Intravenous Line  Duration                  Peripheral IV - Single Lumen 04/11/22 1623 18 G Anterior;Proximal;Right Forearm 16 days                    Significant Labs:    CBC/Anemia Profile:  Recent Labs   Lab 04/27/22  1809   HGB 8.0*   HCT 22.8*          Chemistries:  No results for input(s): NA, K, CL, CO2, BUN, CREATININE, CALCIUM, ALBUMIN, PROT, BILITOT, ALKPHOS, ALT, AST, GLUCOSE, MG, PHOS in the last 48 hours.      All pertinent labs within the past 24 hours have been reviewed.    Significant Imaging:  I have reviewed all pertinent imaging results/findings within the past 24 hours.    Assessment/Plan:     Chronic respiratory failure  Was doing trach collar over the weekend but had to be placed back on the vent earlier this week   - will treat his pain- hopefully this will improve his HR and then start back on weaning trails   4/7- aspirated yesterday -  febrile this morning. Will start Clindamycin today -   4/8- clinically looks better- will resume PSV today as tolerated   4/9- increased respiratory rate and tachycardic on exam.  Patient had just been turned.  This is all likely secondary to pain.  Will treat his pain and then increase his CPAP to 4 hours t.i.d. as tolerated.  4/13- to OR today  Then  -continue PSV as tolerated   4/26 - remains on continuous PSV and now doing well  4/27- once trach is changed out we can start some trach collar    Tachycardia  Treated anemia   - concern this may be releated to pain and pain medication withdraw - his fentanyl patch was discontinued over the weekend which he has been on for weeks.  -plan - will start PO pain medication q8hrs then decrease to q12 hours after 7 days.   4/8 - improving   4/9-heart rate up on exam.  Secondary to pain.  Will treat p.r.n. pain medication.  4/14- had some SVT yesterday that improved with treating his pain and lopressor  4/22- HR in 110's on ditalizem - will add low dose BB   4/25- HR improved - BB was held due to HD this morning   4/28- HR around 100 today      Pressure ulcer of sacral region, unstageable  Culture and sensitivity reviewed -    Fever   ID note and plan reviewed- ( Blood cultures x2 sets ordered; Empirically start tobramycin, vancomycin, and Zosyn; Depending on cultures we can adjust antibiotic therapy.  If blood cultures come back negative, I would treat with antibiotics for 7-10 days depending on how he does clinically)  4/28- On good antibiotics. Had a fever this am. Last blood cultures with no growth    ESRD (end stage renal disease)  Started on HD 12/30   Continue with HD per nephrology      Type 2 diabetes mellitus without complication, without long-term current use of insulin  Continue with current care  Blood sugar adequately controlled    Acute blood loss anemia  No active bleeding  Hgb 6.0 on 4/11  Patient transfused 1 unit with dialysis yesterday  4/13 -H/H  7.5/22.7  4/15- hgb is 6.2 today. We are going to give another unit of PRBC's today with dialysis  4/16 post transfusion cbc is pending  4/18- hgb is 6.6. Will plan for transfusion with 1 unit of prbc's with next HD  4/21 - 7.2/21.6   4/25- 6.6/19.7 - will transfuse one unit PRBCs with next HD   4/28- Hgb now 8.0 following transfusion    Necrotizing fasciitis  S/p multiple surgeries   Last debridement for wounds was 4/13      Hypertension  BP currently ok                   Cecil Abernathy, DO  Pulmonology  [facility]

## 2022-04-28 NOTE — SUBJECTIVE & OBJECTIVE
Interval History: No acute events overnight. The patient is currently resting comfortably. Hemodynamically stable. Tmax of 101 this am. He is oxygenating adequately.    Objective:     Vital Signs (Most Recent):  Temp: (!) 101.1 °F (38.4 °C) (04/28/22 1141)  Pulse: 98 (04/28/22 1130)  Resp: (!) 37 (04/28/22 1130)  BP: (!) 112/55 (04/28/22 1130)  SpO2: 100 % (04/28/22 1130)   Vital Signs (24h Range):  Temp:  [98.4 °F (36.9 °C)-101.1 °F (38.4 °C)] 101.1 °F (38.4 °C)  Pulse:  [] 98  Resp:  [14-42] 37  SpO2:  [99 %-100 %] 100 %  BP: ()/(43-61) 112/55     Weight: 78.2 kg (172 lb 6.4 oz)  Body mass index is 24.04 kg/m².      Intake/Output Summary (Last 24 hours) at 4/28/2022 1147  Last data filed at 4/28/2022 0900  Gross per 24 hour   Intake 2348.33 ml   Output 917 ml   Net 1431.33 ml         Physical Exam  Vitals reviewed.   Constitutional:       General: He is not in acute distress.     Appearance: He is ill-appearing.      Comments: Chronically ill appearing   HENT:      Right Ear: External ear normal.      Left Ear: External ear normal.      Mouth/Throat:      Mouth: Mucous membranes are moist.   Eyes:      Pupils: Pupils are equal, round, and reactive to light.   Cardiovascular:      Rate and Rhythm: Normal rate and regular rhythm.   Pulmonary:      Breath sounds: Normal breath sounds. No wheezing, rhonchi or rales.   Abdominal:      Palpations: Abdomen is soft.   Musculoskeletal:         General: Normal range of motion.      Cervical back: Normal range of motion and neck supple.   Skin:     General: Skin is warm and dry.      Capillary Refill: Capillary refill takes less than 2 seconds.   Neurological:      Mental Status: He is alert. Mental status is at baseline.       Vents:  Vent Mode: CPAP PSV (04/28/22 0831)  Ventilator Initiated: No (04/23/22 1934)  Set Rate: 0 BPM (04/28/22 0548)  Vt Set: 0 mL (04/28/22 0548)  Pressure Support: 10 cmH20 (04/28/22 0831)  PEEP/CPAP: 5 cmH20 (04/28/22 0831)  Oxygen  Concentration (%): 35 (04/28/22 0811)  Peak Airway Pressure: 16 cmH2O (04/28/22 0831)  Plateau Pressure: 20 cmH20 (03/07/22 0500)  Total Ve: 13.2 mL (04/28/22 0831)  F/VT Ratio<105 (RSBI): (!) 71.96 (04/28/22 0022)    Lines/Drains/Airways       Central Venous Catheter Line  Duration                  Hemodialysis Catheter 12/30/21 0900 right internal jugular 119 days              Drain  Duration                  Colostomy 12/18/21 1030 Descending/sigmoid  days         Gastrostomy/Enterostomy 03/09/22 1436 Percutaneous endoscopic gastrostomy (PEG) midline feeding 49 days              Airway  Duration                  Surgical Airway 04/27/22 0856 Shiley;Other (Comment) Cuffed;Long 1 day              Peripheral Intravenous Line  Duration                  Peripheral IV - Single Lumen 04/11/22 1623 18 G Anterior;Proximal;Right Forearm 16 days                    Significant Labs:    CBC/Anemia Profile:  Recent Labs   Lab 04/27/22  1809   HGB 8.0*   HCT 22.8*          Chemistries:  No results for input(s): NA, K, CL, CO2, BUN, CREATININE, CALCIUM, ALBUMIN, PROT, BILITOT, ALKPHOS, ALT, AST, GLUCOSE, MG, PHOS in the last 48 hours.      All pertinent labs within the past 24 hours have been reviewed.    Significant Imaging:  I have reviewed all pertinent imaging results/findings within the past 24 hours.

## 2022-04-28 NOTE — ASSESSMENT & PLAN NOTE
ID note and plan reviewed- ( Blood cultures x2 sets ordered; Empirically start tobramycin, vancomycin, and Zosyn; Depending on cultures we can adjust antibiotic therapy.  If blood cultures come back negative, I would treat with antibiotics for 7-10 days depending on how he does clinically)  4/28- On good antibiotics. Had a fever this am. Last blood cultures with no growth

## 2022-04-28 NOTE — ASSESSMENT & PLAN NOTE
No active bleeding  Hgb 6.0 on 4/11  Patient transfused 1 unit with dialysis yesterday  4/13 -H/H 7.5/22.7  4/15- hgb is 6.2 today. We are going to give another unit of PRBC's today with dialysis  4/16 post transfusion cbc is pending  4/18- hgb is 6.6. Will plan for transfusion with 1 unit of prbc's with next HD  4/21 - 7.2/21.6   4/25- 6.6/19.7 - will transfuse one unit PRBCs with next HD   4/28- Hgb now 8.0 following transfusion

## 2022-04-29 NOTE — PROGRESS NOTES
[facility]  Pulmonology  Progress Note    Patient Name: Og Garcia  MRN: [id number]  Admission Date: 12/23/2021  Hospital Length of Stay: 127 days  Code Status: Prior  Attending Provider: Cecil Abernathy DO  Primary Care Provider: Hector Arndt DNP, FNP-C   Principal Problem: Denice gangrene    Subjective:     Interval History:  Patient awake alert    Objective:     Vital Signs (Most Recent):  Temp: 97.2 °F (36.2 °C) (04/29/22 0400)  Pulse: 97 (04/29/22 0600)  Resp: (!) 24 (04/29/22 0600)  BP: (!) 105/51 (04/29/22 0603)  SpO2: 100 % (04/29/22 0600)   Vital Signs (24h Range):  Temp:  [97.2 °F (36.2 °C)-101.1 °F (38.4 °C)] 97.2 °F (36.2 °C)  Pulse:  [] 97  Resp:  [10-42] 24  SpO2:  [94 %-100 %] 100 %  BP: ()/(36-61) 105/51     Weight: 81.1 kg (178 lb 12.7 oz)  Body mass index is 24.94 kg/m².      Intake/Output Summary (Last 24 hours) at 4/29/2022 0741  Last data filed at 4/29/2022 0600  Gross per 24 hour   Intake 1937.5 ml   Output 275 ml   Net 1662.5 ml       Physical Exam  Vitals reviewed.   Constitutional:       Appearance: Normal appearance.      Interventions: He is not intubated.     Comments: Tracheostomy   HENT:      Head: Normocephalic and atraumatic.      Nose: Nose normal.      Mouth/Throat:      Mouth: Mucous membranes are dry.      Pharynx: Oropharynx is clear.   Eyes:      Extraocular Movements: Extraocular movements intact.      Conjunctiva/sclera: Conjunctivae normal.      Pupils: Pupils are equal, round, and reactive to light.   Cardiovascular:      Rate and Rhythm: Normal rate.      Heart sounds: Normal heart sounds. No murmur heard.  Pulmonary:      Effort: Pulmonary effort is normal. He is not intubated.      Breath sounds: Normal breath sounds.   Abdominal:      General: Abdomen is flat. Bowel sounds are normal.      Palpations: Abdomen is soft.   Musculoskeletal:         General: Normal range of motion.      Cervical back: Normal range of motion  and neck supple.      Right lower leg: No edema.      Left lower leg: No edema.   Skin:     General: Skin is warm and dry.      Capillary Refill: Capillary refill takes less than 2 seconds.   Neurological:      General: No focal deficit present.      Mental Status: He is alert and oriented to person, place, and time.   Psychiatric:         Mood and Affect: Mood normal.         Behavior: Behavior normal.       Vents:  Vent Mode: CPAP / PSV (04/29/22 0531)  Ventilator Initiated: No (04/23/22 1934)  Set Rate: 0 BPM (04/29/22 0531)  Vt Set: 0 mL (04/29/22 0531)  Pressure Support: 10 cmH20 (04/29/22 0531)  PEEP/CPAP: 5 cmH20 (04/29/22 0531)  Oxygen Concentration (%): 35 (04/29/22 0600)  Peak Airway Pressure: 16 cmH2O (04/29/22 0531)  Plateau Pressure: 20 cmH20 (03/07/22 0500)  Total Ve: 8.6 mL (04/29/22 0531)  F/VT Ratio<105 (RSBI): (!) 80.11 (04/28/22 1938)    Lines/Drains/Airways       Central Venous Catheter Line  Duration                  Hemodialysis Catheter 12/30/21 0900 right internal jugular 119 days              Drain  Duration                  Colostomy 12/18/21 1030 Descending/sigmoid  days         Gastrostomy/Enterostomy 03/09/22 1436 Percutaneous endoscopic gastrostomy (PEG) midline feeding 50 days              Airway  Duration                  Surgical Airway 04/27/22 0856 Shiley;Other (Comment) Cuffed;Long 1 day              Peripheral Intravenous Line  Duration                  Peripheral IV - Single Lumen 04/11/22 1623 18 G Anterior;Proximal;Right Forearm 17 days                    Significant Labs:    CBC/Anemia Profile:  Recent Labs   Lab 04/27/22  1809 04/29/22  0246   WBC  --  12.92*   HGB 8.0* 7.1*   HCT 22.8* 20.4*   PLT  --  496*   MCV  --  92.3   RDW  --  15.9*        Chemistries:  Recent Labs   Lab 04/29/22  0247   *   K 3.4*   CL 98   CO2 24   BUN 53*   CREATININE 1.79*   CALCIUM 7.9*       Recent Lab Results  (Last 5 results in the past 24 hours)        04/29/22  0615    04/29/22  0247   04/29/22  0246   04/29/22  0102   04/28/22  2222        Anion Gap   16             Aniso     1+           Baso #     0.08           Basophil %     0.6           BUN   53             BUN/CREAT RATIO   30             Calcium   7.9             Chloride   98             CO2   24             Creatinine   1.79             Differential Type     Manual           eGFR if non    41             Eos #     0.63           Eosinophil %     4.9                2           Glucose   73             Hematocrit     20.4           Hemoglobin     7.1           Immature Grans (Abs)     0.37           Immature Granulocytes     2.9           Lymph #     2.43           Lymph %     18.8                18           MCH     32.1           MCHC     34.8           MCV     92.3           Mono #     1.42           Mono %     11.0                8           MPV     8.9           Neutrophils, Abs     7.99           Neutrophils Relative     61.8           nRBC     0.0           NUCLEATED RBC ABSOLUTE     0.00           PLATELET MORPHOLOGY     Few Large Platelets           Platelets     496           POC Glucose 95       107   96       Potassium   3.4             RBC     2.21           RDW     15.9           Segmented Neutrophils, Man %     72           Sodium   135             Tobramycin, Trough     1.7           Vancomycin-Trough     21.6           WBC     12.92                                  Significant Imaging:  I have reviewed all pertinent imaging results/findings within the past 24 hours.    Assessment/Plan:     * Denice gangrene  - s/p multiple debridements  - 2/21 back to OR for skin grafting  - 3/9 had peg tube placed           Hypertension  BP currently ok      Chronic respiratory failure  Was doing trach collar over the weekend but had to be placed back on the vent earlier this week   - will treat his pain- hopefully this will improve his HR and then start back on weaning trails   4/7- aspirated yesterday  - febrile this morning. Will start Clindamycin today -   4/8- clinically looks better- will resume PSV today as tolerated   4/9- increased respiratory rate and tachycardic on exam.  Patient had just been turned.  This is all likely secondary to pain.  Will treat his pain and then increase his CPAP to 4 hours t.i.d. as tolerated.  4/13- to OR today  Then  -continue PSV as tolerated   4/26 - remains on continuous PSV and now doing well  4/27- once trach is changed out we can start some trach collar  04/29/2022 start trach collar trials    Type 2 diabetes mellitus without complication, without long-term current use of insulin  Continue with current care  Blood sugar adequately controlled    Acute blood loss anemia  No active bleeding  Hgb 6.0 on 4/11  Patient transfused 1 unit with dialysis yesterday  4/13 -H/H 7.5/22.7  4/15- hgb is 6.2 today. We are going to give another unit of PRBC's today with dialysis  4/16 post transfusion cbc is pending  4/18- hgb is 6.6. Will plan for transfusion with 1 unit of prbc's with next HD  4/21 - 7.2/21.6   4/25- 6.6/19.7 - will transfuse one unit PRBCs with next HD   4/28- Hgb now 8.0 following transfusion                 Jose Urabn MD  Pulmonology  [facility]

## 2022-04-29 NOTE — SUBJECTIVE & OBJECTIVE
Interval History:  Patient awake alert    Objective:     Vital Signs (Most Recent):  Temp: 97.2 °F (36.2 °C) (04/29/22 0400)  Pulse: 97 (04/29/22 0600)  Resp: (!) 24 (04/29/22 0600)  BP: (!) 105/51 (04/29/22 0603)  SpO2: 100 % (04/29/22 0600)   Vital Signs (24h Range):  Temp:  [97.2 °F (36.2 °C)-101.1 °F (38.4 °C)] 97.2 °F (36.2 °C)  Pulse:  [] 97  Resp:  [10-42] 24  SpO2:  [94 %-100 %] 100 %  BP: ()/(36-61) 105/51     Weight: 81.1 kg (178 lb 12.7 oz)  Body mass index is 24.94 kg/m².      Intake/Output Summary (Last 24 hours) at 4/29/2022 0741  Last data filed at 4/29/2022 0600  Gross per 24 hour   Intake 1937.5 ml   Output 275 ml   Net 1662.5 ml       Physical Exam  Vitals reviewed.   Constitutional:       Appearance: Normal appearance.      Interventions: He is not intubated.     Comments: Tracheostomy   HENT:      Head: Normocephalic and atraumatic.      Nose: Nose normal.      Mouth/Throat:      Mouth: Mucous membranes are dry.      Pharynx: Oropharynx is clear.   Eyes:      Extraocular Movements: Extraocular movements intact.      Conjunctiva/sclera: Conjunctivae normal.      Pupils: Pupils are equal, round, and reactive to light.   Cardiovascular:      Rate and Rhythm: Normal rate.      Heart sounds: Normal heart sounds. No murmur heard.  Pulmonary:      Effort: Pulmonary effort is normal. He is not intubated.      Breath sounds: Normal breath sounds.   Abdominal:      General: Abdomen is flat. Bowel sounds are normal.      Palpations: Abdomen is soft.   Musculoskeletal:         General: Normal range of motion.      Cervical back: Normal range of motion and neck supple.      Right lower leg: No edema.      Left lower leg: No edema.   Skin:     General: Skin is warm and dry.      Capillary Refill: Capillary refill takes less than 2 seconds.   Neurological:      General: No focal deficit present.      Mental Status: He is alert and oriented to person, place, and time.   Psychiatric:         Mood and  Affect: Mood normal.         Behavior: Behavior normal.       Vents:  Vent Mode: CPAP / PSV (04/29/22 0531)  Ventilator Initiated: No (04/23/22 1934)  Set Rate: 0 BPM (04/29/22 0531)  Vt Set: 0 mL (04/29/22 0531)  Pressure Support: 10 cmH20 (04/29/22 0531)  PEEP/CPAP: 5 cmH20 (04/29/22 0531)  Oxygen Concentration (%): 35 (04/29/22 0600)  Peak Airway Pressure: 16 cmH2O (04/29/22 0531)  Plateau Pressure: 20 cmH20 (03/07/22 0500)  Total Ve: 8.6 mL (04/29/22 0531)  F/VT Ratio<105 (RSBI): (!) 80.11 (04/28/22 1938)    Lines/Drains/Airways       Central Venous Catheter Line  Duration                  Hemodialysis Catheter 12/30/21 0900 right internal jugular 119 days              Drain  Duration                  Colostomy 12/18/21 1030 Descending/sigmoid  days         Gastrostomy/Enterostomy 03/09/22 1436 Percutaneous endoscopic gastrostomy (PEG) midline feeding 50 days              Airway  Duration                  Surgical Airway 04/27/22 0856 Shiley;Other (Comment) Cuffed;Long 1 day              Peripheral Intravenous Line  Duration                  Peripheral IV - Single Lumen 04/11/22 1623 18 G Anterior;Proximal;Right Forearm 17 days                    Significant Labs:    CBC/Anemia Profile:  Recent Labs   Lab 04/27/22  1809 04/29/22  0246   WBC  --  12.92*   HGB 8.0* 7.1*   HCT 22.8* 20.4*   PLT  --  496*   MCV  --  92.3   RDW  --  15.9*        Chemistries:  Recent Labs   Lab 04/29/22  0247   *   K 3.4*   CL 98   CO2 24   BUN 53*   CREATININE 1.79*   CALCIUM 7.9*       Recent Lab Results  (Last 5 results in the past 24 hours)        04/29/22  0615   04/29/22  0247   04/29/22  0246   04/29/22  0102   04/28/22  2222        Anion Gap   16             Aniso     1+           Baso #     0.08           Basophil %     0.6           BUN   53             BUN/CREAT RATIO   30             Calcium   7.9             Chloride   98             CO2   24             Creatinine   1.79             Differential Type      Manual           eGFR if non    41             Eos #     0.63           Eosinophil %     4.9                2           Glucose   73             Hematocrit     20.4           Hemoglobin     7.1           Immature Grans (Abs)     0.37           Immature Granulocytes     2.9           Lymph #     2.43           Lymph %     18.8                18           MCH     32.1           MCHC     34.8           MCV     92.3           Mono #     1.42           Mono %     11.0                8           MPV     8.9           Neutrophils, Abs     7.99           Neutrophils Relative     61.8           nRBC     0.0           NUCLEATED RBC ABSOLUTE     0.00           PLATELET MORPHOLOGY     Few Large Platelets           Platelets     496           POC Glucose 95       107   96       Potassium   3.4             RBC     2.21           RDW     15.9           Segmented Neutrophils, Man %     72           Sodium   135             Tobramycin, Trough     1.7           Vancomycin-Trough     21.6           WBC     12.92                                  Significant Imaging:  I have reviewed all pertinent imaging results/findings within the past 24 hours.

## 2022-04-29 NOTE — PROGRESS NOTES
Patient is resting comfortably.  He did open his eyes when I palpated him for edema.    Physical exam general patient is chronically ill-appearing, heart is regular rate and rhythm, he has no pitting edema, lungs are clear to auscultation anteriorly, abdomen is soft with decreased bowel sounds.    Assessment/plan 1.  ESRD-continue HD support  2. Hypertension-continue present antihypertensives patient's systolic blood pressures around 113 today  3. Diabetes mellitus-patient's glucose was around 95 before breakfast    4. Secondary hyperparathyroidism-continue Renvela, will check a phosphorus level he may be able to come off the Renvela  5. Anemia-patient's hematocrit is 20%, and stable

## 2022-04-29 NOTE — ASSESSMENT & PLAN NOTE
Was doing trach collar over the weekend but had to be placed back on the vent earlier this week   - will treat his pain- hopefully this will improve his HR and then start back on weaning trails   4/7- aspirated yesterday - febrile this morning. Will start Clindamycin today -   4/8- clinically looks better- will resume PSV today as tolerated   4/9- increased respiratory rate and tachycardic on exam.  Patient had just been turned.  This is all likely secondary to pain.  Will treat his pain and then increase his CPAP to 4 hours t.i.d. as tolerated.  4/13- to OR today  Then  -continue PSV as tolerated   4/26 - remains on continuous PSV and now doing well  4/27- once trach is changed out we can start some trach collar  04/29/2022 start trach collar trials

## 2022-04-29 NOTE — PROGRESS NOTES
Pharmacokinetic Follow Up: Tobramycin    Assessment of levels:   Tobra level of 1.7 is within the desired range of </= 2    Regimen Plan:   Tobra 240 mg IV x 1 today after dialysis.  A random tobra level has been ordered for 5/2 at 0600.    Drug levels (last 3 results):  No results for input(s): AMIKACINPEAK, AMIKACINTROU, AMIKACINRAND, AMIKACIN in the last 72 hours.    No results for input(s): GENTAMICIN, GENTPEAK, GENTTROUGH, GENT10, GENT12, GENT8, GENTRANDOM in the last 72 hours.    Recent Labs   Lab Result Units 04/27/22  0437 04/29/22  0246   Tobramycin, Random µg/mL 1.2  --    Tobramycin, Trough µg/mL  --  1.7       Pharmacy will continue to monitor.    Please contact pharmacy at extension [phone] with any questions regarding this assessment.    Patient brief summary:  Og Garcia is a 66 y.o. male initiated on aminoglycoside therapy for treatment of skin & soft tissue infection    Drug Allergies:   Review of patient's allergies indicates:  No Known Allergies    Actual Body Weight:   81.1 kg    Adjust Body Weight:   77.6 kg    Ideal Body Weight:  75.3 kg    Renal Function:   Estimated Creatinine Clearance: 43.2 mL/min (A) (based on SCr of 1.79 mg/dL (H)).,     Dialysis Method (if applicable):  intermittent HD    CBC (last 72 hours):  Recent Labs   Lab Result Units 04/27/22  1809 04/29/22  0246   WBC K/uL  --  12.92*   Hemoglobin g/dL 8.0* 7.1*   Hematocrit % 22.8* 20.4*   Platelet Count K/uL  --  496*   Lymphocytes % %  --  18.8*   Lymphocytes, Man % %  --  18*   Monocytes % %  --  11.0*   Monocytes, Man % %  --  8*   Eosinophils % %  --  4.9*   Eosinophils, Man % %  --  2   Basophils % %  --  0.6       Metabolic Panel (last 72 hours):  Recent Labs   Lab Result Units 04/29/22  0247   Sodium mmol/L 135*   Potassium mmol/L 3.4*   Chloride mmol/L 98   CO2 mmol/L 24   Glucose mg/dL 73*   BUN mg/dL 53*   Creatinine mg/dL 1.79*       Aminoglycoside Administrations:  aminoglycosides given in last 96 hours                      tobramycin (NEBCIN) 240 mg in dextrose 5 % IVPB (mg) 240 mg New Bag 04/27/22 2054    tobramycin (NEBCIN) 240 mg in dextrose 5 % IVPB (mg) 240 mg New Bag 04/25/22 1521                    Microbiologic Results:  Microbiology Results (last 7 days)       Procedure Component Value Units Date/Time    Blood culture [[id number]] Collected: 04/21/22 1302    Order Status: Completed Specimen: Blood Updated: 04/29/22 0601     Culture, Blood No Growth At 48 Hours    Blood culture [[id number]] Collected: 04/21/22 1302    Order Status: Completed Specimen: Blood Updated: 04/29/22 0601     Culture, Blood No Growth At 48 Hours

## 2022-04-29 NOTE — PLAN OF CARE
Problem: Adult Inpatient Plan of Care  Goal: Plan of Care Review  Outcome: Ongoing, Progressing  Goal: Optimal Comfort and Wellbeing  Outcome: Ongoing, Progressing     Problem: Adjustment to Illness (Sepsis/Septic Shock)  Goal: Optimal Coping  Outcome: Ongoing, Progressing     Problem: Bleeding (Sepsis/Septic Shock)  Goal: Absence of Bleeding  Outcome: Ongoing, Progressing     Problem: Glycemic Control Impaired (Sepsis/Septic Shock)  Goal: Blood Glucose Level Within Desired Range  Outcome: Ongoing, Progressing     Problem: Nutrition Impaired (Sepsis/Septic Shock)  Goal: Optimal Nutrition Intake  Outcome: Ongoing, Progressing     Problem: Oral Intake Inadequate (Acute Kidney Injury/Impairment)  Goal: Optimal Nutrition Intake  Outcome: Ongoing, Progressing     Problem: Fall Injury Risk  Goal: Absence of Fall and Fall-Related Injury  Outcome: Ongoing, Progressing     Problem: Device-Related Complication Risk (Mechanical Ventilation, Invasive)  Goal: Optimal Device Function  Outcome: Ongoing, Progressing     Problem: Inability to Wean (Mechanical Ventilation, Invasive)  Goal: Mechanical Ventilation Liberation  Outcome: Ongoing, Progressing     Problem: Nutrition Impairment (Mechanical Ventilation, Invasive)  Goal: Optimal Nutrition Delivery  Outcome: Ongoing, Progressing     Problem: Adult Inpatient Plan of Care  Goal: Patient-Specific Goal (Individualized)  Outcome: Ongoing, Not Progressing  Goal: Absence of Hospital-Acquired Illness or Injury  Outcome: Ongoing, Not Progressing  Goal: Readiness for Transition of Care  Outcome: Ongoing, Not Progressing     Problem: Infection Progression (Sepsis/Septic Shock)  Goal: Absence of Infection Signs and Symptoms  Outcome: Ongoing, Not Progressing     Problem: Fluid and Electrolyte Imbalance (Acute Kidney Injury/Impairment)  Goal: Fluid and Electrolyte Balance  Outcome: Ongoing, Not Progressing     Problem: Renal Function Impairment (Acute Kidney Injury/Impairment)  Goal:  Effective Renal Function  Outcome: Ongoing, Not Progressing     Problem: Communication Impairment (Mechanical Ventilation, Invasive)  Goal: Effective Communication  Outcome: Ongoing, Not Progressing     Problem: Skin and Tissue Injury (Mechanical Ventilation, Invasive)  Goal: Absence of Device-Related Skin and Tissue Injury  Outcome: Ongoing, Not Progressing     Problem: Skin and Tissue Injury (Artificial Airway)  Goal: Absence of Device-Related Skin or Tissue Injury  Outcome: Ongoing, Not Progressing

## 2022-04-29 NOTE — PROGRESS NOTES
Pharmacokinetic Assessment Follow Up: IV Vancomycin    Vancomycin serum concentration assessment(s):    The random level was drawn correctly and can be used to guide therapy at this time. The measurement is above the desired definitive target range of < 20    Vancomycin Regimen Plan:    Re-dose when the random level is less than 20 mcg/mL, next level to be drawn at 0600 on 5/2    Drug levels (last 3 results):  Recent Labs   Lab Result Units 04/27/22  0437 04/29/22  0246   Vancomycin, Random µg/mL 12.2  --    Vancomycin, Trough µg/mL  --  21.6*       Pharmacy will continue to follow and monitor vancomycin.    Please contact pharmacy at extension [phone] for questions regarding this assessment.    Patient brief summary:  Og Garcia is a 66 y.o. male initiated on antimicrobial therapy with IV Vancomycin for treatment of skin & soft tissue infection    Drug Allergies:   Review of patient's allergies indicates:  No Known Allergies    Actual Body Weight:   81.1 kg    Renal Function:   Estimated Creatinine Clearance: 43.2 mL/min (A) (based on SCr of 1.79 mg/dL (H)).,     Dialysis Method (if applicable):  intermittent HD    CBC (last 72 hours):  Recent Labs   Lab Result Units 04/27/22  1809 04/29/22  0246   WBC K/uL  --  12.92*   Hemoglobin g/dL 8.0* 7.1*   Hematocrit % 22.8* 20.4*   Platelet Count K/uL  --  496*   Lymphocytes % %  --  18.8*   Lymphocytes, Man % %  --  18*   Monocytes % %  --  11.0*   Monocytes, Man % %  --  8*   Eosinophils % %  --  4.9*   Eosinophils, Man % %  --  2   Basophils % %  --  0.6       Metabolic Panel (last 72 hours):  Recent Labs   Lab Result Units 04/29/22  0247   Sodium mmol/L 135*   Potassium mmol/L 3.4*   Chloride mmol/L 98   CO2 mmol/L 24   Glucose mg/dL 73*   BUN mg/dL 53*   Creatinine mg/dL 1.79*       Vancomycin Administrations:  vancomycin given in the last 96 hours                     vancomycin (VANCOCIN) 1,500 mg in dextrose 5 % 250 mL IVPB (mg) 1,500 mg New Bag 04/27/22 0566                     Microbiologic Results:  Microbiology Results (last 7 days)       Procedure Component Value Units Date/Time    Blood culture [[id number]] Collected: 04/21/22 1302    Order Status: Completed Specimen: Blood Updated: 04/29/22 0601     Culture, Blood No Growth At 48 Hours    Blood culture [[id number]] Collected: 04/21/22 1302    Order Status: Completed Specimen: Blood Updated: 04/29/22 0601     Culture, Blood No Growth At 48 Hours

## 2022-04-29 NOTE — PROGRESS NOTES
Wound care note;  Patient skin and dressing change was performed today. ELAS Young FNP assessed wounds  Sacral wound less odor noted, tan to pink tissue noted, Mod amount of serosanguinous drainage   Right lateral lower leg slowly improving, tendon noted to upper wound  Left heel slow progression, tan necrotic tissue   Cont silvadene moist gauze to all wounds  Slow progression of all wounds  Cont turn protocol  Waffle boots  On low air loss mattress  Wound care team to follow

## 2022-04-30 NOTE — ASSESSMENT & PLAN NOTE
Was doing trach collar over the weekend but had to be placed back on the vent earlier this week   - will treat his pain- hopefully this will improve his HR and then start back on weaning trails   4/7- aspirated yesterday - febrile this morning. Will start Clindamycin today -   4/8- clinically looks better- will resume PSV today as tolerated   4/9- increased respiratory rate and tachycardic on exam.  Patient had just been turned.  This is all likely secondary to pain.  Will treat his pain and then increase his CPAP to 4 hours t.i.d. as tolerated.  4/13- to OR today  Then  -continue PSV as tolerated   4/26 - remains on continuous PSV and now doing well  4/27- once trach is changed out we can start some trach collar  Patient had a spell of tachycardia put back on the ventilator restart CPAP trials

## 2022-04-30 NOTE — SUBJECTIVE & OBJECTIVE
Interval History:  Patient awake    Objective:     Vital Signs (Most Recent):  Temp: 98.2 °F (36.8 °C) (04/30/22 0300)  Pulse: 104 (04/30/22 0420)  Resp: (!) 22 (04/30/22 0420)  BP: (!) 110/52 (04/30/22 0315)  SpO2: 100 % (04/30/22 0420)   Vital Signs (24h Range):  Temp:  [97 °F (36.1 °C)-99.7 °F (37.6 °C)] 98.2 °F (36.8 °C)  Pulse:  [] 104  Resp:  [13-41] 22  SpO2:  [89 %-100 %] 100 %  BP: ()/(47-67) 110/52     Weight: 81.3 kg (179 lb 3.7 oz)  Body mass index is 25 kg/m².      Intake/Output Summary (Last 24 hours) at 4/30/2022 0536  Last data filed at 4/30/2022 0526  Gross per 24 hour   Intake 2971.25 ml   Output 2326 ml   Net 645.25 ml       Physical Exam    Vents:  Vent Mode: A/C (04/30/22 0420)  Ventilator Initiated: No (04/23/22 1934)  Set Rate: 14 BPM (04/30/22 0420)  Vt Set: 450 mL (04/30/22 0420)  Pressure Support: 10 cmH20 (04/29/22 1944)  PEEP/CPAP: 5 cmH20 (04/30/22 0420)  Oxygen Concentration (%): 35 (04/30/22 0420)  Peak Airway Pressure: 20 cmH2O (04/30/22 0420)  Plateau Pressure: 20 cmH20 (03/07/22 0500)  Total Ve: 10.8 mL (04/30/22 0420)  F/VT Ratio<105 (RSBI): (!) 51.89 (04/30/22 0420)    Lines/Drains/Airways       Central Venous Catheter Line  Duration                  Hemodialysis Catheter 12/30/21 0900 right internal jugular 120 days              Drain  Duration                  Colostomy 12/18/21 1030 Descending/sigmoid  days         Gastrostomy/Enterostomy 03/09/22 1436 Percutaneous endoscopic gastrostomy (PEG) midline feeding 51 days              Airway  Duration                  Surgical Airway 04/27/22 0856 Marychuy;Other (Comment) Cuffed;Long 2 days              Peripheral Intravenous Line  Duration                  Peripheral IV - Single Lumen 04/11/22 1623 18 G Anterior;Proximal;Right Forearm 18 days                    Significant Labs:    CBC/Anemia Profile:  Recent Labs   Lab 04/29/22  0246   WBC 12.92*   HGB 7.1*   HCT 20.4*   *   MCV 92.3   RDW 15.9*         Chemistries:  Recent Labs   Lab 04/29/22  0247   *   K 3.4*   CL 98   CO2 24   BUN 53*   CREATININE 1.79*   CALCIUM 7.9*       Recent Lab Results         04/29/22  1839   04/29/22  1156   04/29/22  0615        POC Glucose 95   96   95               Significant Imaging:  I have reviewed all pertinent imaging results/findings within the past 24 hours.

## 2022-04-30 NOTE — PROGRESS NOTES
[facility]  Pulmonology  Progress Note    Patient Name: Og Garcia  MRN: [id number]  Admission Date: 12/23/2021  Hospital Length of Stay: 128 days  Code Status: Prior  Attending Provider: Cecil Abernathy DO  Primary Care Provider: Hector Arndt DNP, FNP-C   Principal Problem: Denice gangrene    Subjective:     Interval History:  Patient awake    Objective:     Vital Signs (Most Recent):  Temp: 98.2 °F (36.8 °C) (04/30/22 0300)  Pulse: 104 (04/30/22 0420)  Resp: (!) 22 (04/30/22 0420)  BP: (!) 110/52 (04/30/22 0315)  SpO2: 100 % (04/30/22 0420)   Vital Signs (24h Range):  Temp:  [97 °F (36.1 °C)-99.7 °F (37.6 °C)] 98.2 °F (36.8 °C)  Pulse:  [] 104  Resp:  [13-41] 22  SpO2:  [89 %-100 %] 100 %  BP: ()/(47-67) 110/52     Weight: 81.3 kg (179 lb 3.7 oz)  Body mass index is 25 kg/m².      Intake/Output Summary (Last 24 hours) at 4/30/2022 0536  Last data filed at 4/30/2022 0526  Gross per 24 hour   Intake 2971.25 ml   Output 2326 ml   Net 645.25 ml       Physical Exam    Vents:  Vent Mode: A/C (04/30/22 0420)  Ventilator Initiated: No (04/23/22 1934)  Set Rate: 14 BPM (04/30/22 0420)  Vt Set: 450 mL (04/30/22 0420)  Pressure Support: 10 cmH20 (04/29/22 1944)  PEEP/CPAP: 5 cmH20 (04/30/22 0420)  Oxygen Concentration (%): 35 (04/30/22 0420)  Peak Airway Pressure: 20 cmH2O (04/30/22 0420)  Plateau Pressure: 20 cmH20 (03/07/22 0500)  Total Ve: 10.8 mL (04/30/22 0420)  F/VT Ratio<105 (RSBI): (!) 51.89 (04/30/22 0420)    Lines/Drains/Airways       Central Venous Catheter Line  Duration                  Hemodialysis Catheter 12/30/21 0900 right internal jugular 120 days              Drain  Duration                  Colostomy 12/18/21 1030 Descending/sigmoid  days         Gastrostomy/Enterostomy 03/09/22 1436 Percutaneous endoscopic gastrostomy (PEG) midline feeding 51 days              Airway  Duration                  Surgical Airway 04/27/22 0856 Marychuy;Other (Comment)  Cuffed;Long 2 days              Peripheral Intravenous Line  Duration                  Peripheral IV - Single Lumen 04/11/22 1623 18 G Anterior;Proximal;Right Forearm 18 days                    Significant Labs:    CBC/Anemia Profile:  Recent Labs   Lab 04/29/22  0246   WBC 12.92*   HGB 7.1*   HCT 20.4*   *   MCV 92.3   RDW 15.9*        Chemistries:  Recent Labs   Lab 04/29/22  0247   *   K 3.4*   CL 98   CO2 24   BUN 53*   CREATININE 1.79*   CALCIUM 7.9*       Recent Lab Results         04/29/22  1839   04/29/22  1156   04/29/22  0615        POC Glucose 95   96   95               Significant Imaging:  I have reviewed all pertinent imaging results/findings within the past 24 hours.    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 Will start Clindamycin today -   4/8- clinically looks better- will resume PSV today as tolerated   4/9- increased respiratory rate and tachycardic on exam.  Patient had just been turned.  This is all likely secondary to pain.  Will treat his pain and then increase his CPAP to 4 hours t.i.d. as tolerated.  4/13- to OR today  Then  -continue PSV as tolerated   4/26 - remains on continuous PSV and now doing well  4/27- once trach is changed out we can start some trach collar  Patient had a spell of tachycardia put back on the ventilator restart CPAP trials    Hypertension  BP currently ok      ESRD (end stage renal disease)  Started on HD 12/30   Continue with HD per nephrology      Type 2 diabetes mellitus without complication, without long-term current use of insulin  Blood sugar tightly controlled                 Jose Urban MD  Pulmonology  [facility]

## 2022-04-30 NOTE — PROGRESS NOTES
Placed patient back on the vent via ACV with previous settings for rest.  As I walked in the patient's room, he was having several runs of V-Tach.  Patient respiration were 40, sats 100%, HR reading 160 with the runs of V-Tach.  Patient's WOB was increased at that time.   Patient seems to be resting well on ACV with respiratory rate @ 24, sats @ 100% and heart rate at 104 with no runs of V-Tach at this moment.  I will continue to monitor patient closely.

## 2022-05-01 NOTE — PROGRESS NOTES
Placed patient back on the vent via ACV with previous settings for rest.  Patient did 16 hours of CPAP 5, PSV 15, FIO2 35%.  Patient respiratory rate increased at times but they would go back down.  Overall patient tolerated CPAP well.

## 2022-05-01 NOTE — PLAN OF CARE
Problem: Adult Inpatient Plan of Care  Goal: Plan of Care Review  Outcome: Ongoing, Progressing  Goal: Patient-Specific Goal (Individualized)  Outcome: Ongoing, Progressing  Goal: Optimal Comfort and Wellbeing  Outcome: Ongoing, Progressing     Problem: Adjustment to Illness (Sepsis/Septic Shock)  Goal: Optimal Coping  Outcome: Ongoing, Progressing     Problem: Bleeding (Sepsis/Septic Shock)  Goal: Absence of Bleeding  Outcome: Ongoing, Progressing     Problem: Glycemic Control Impaired (Sepsis/Septic Shock)  Goal: Blood Glucose Level Within Desired Range  Outcome: Ongoing, Progressing     Problem: Nutrition Impaired (Sepsis/Septic Shock)  Goal: Optimal Nutrition Intake  Outcome: Ongoing, Progressing     Problem: Oral Intake Inadequate (Acute Kidney Injury/Impairment)  Goal: Optimal Nutrition Intake  Outcome: Ongoing, Progressing     Problem: Fall Injury Risk  Goal: Absence of Fall and Fall-Related Injury  Outcome: Ongoing, Progressing     Problem: Device-Related Complication Risk (Mechanical Ventilation, Invasive)  Goal: Optimal Device Function  Outcome: Ongoing, Progressing     Problem: Adult Inpatient Plan of Care  Goal: Absence of Hospital-Acquired Illness or Injury  Outcome: Ongoing, Not Progressing  Goal: Readiness for Transition of Care  Outcome: Ongoing, Not Progressing     Problem: Infection Progression (Sepsis/Septic Shock)  Goal: Absence of Infection Signs and Symptoms  Outcome: Ongoing, Not Progressing     Problem: Renal Function Impairment (Acute Kidney Injury/Impairment)  Goal: Effective Renal Function  Outcome: Ongoing, Not Progressing     Problem: Infection  Goal: Absence of Infection Signs and Symptoms  Outcome: Ongoing, Not Progressing

## 2022-05-01 NOTE — PROGRESS NOTES
[facility]  Pulmonology  Progress Note    Patient Name: Og Garcia  MRN: [id number]  Admission Date: 12/23/2021  Hospital Length of Stay: 129 days  Code Status: Prior  Attending Provider: Cecil Abernathy DO  Primary Care Provider: Hector Arndt DNP, FNP-C   Principal Problem: Denice gangrene    Subjective:     Interval History:  Patient awake alert    Objective:     Vital Signs (Most Recent):  Temp: 97.4 °F (36.3 °C) (05/01/22 0300)  Pulse: 108 (05/01/22 0600)  Resp: (!) 21 (05/01/22 0600)  BP: (!) 110/52 (05/01/22 0600)  SpO2: 100 % (05/01/22 0600)   Vital Signs (24h Range):  Temp:  [97.4 °F (36.3 °C)-100 °F (37.8 °C)] 97.4 °F (36.3 °C)  Pulse:  [] 108  Resp:  [10-35] 21  SpO2:  [100 %] 100 %  BP: ()/(41-60) 110/52     Weight: 80.4 kg (177 lb 4 oz)  Body mass index is 24.72 kg/m².      Intake/Output Summary (Last 24 hours) at 5/1/2022 0712  Last data filed at 5/1/2022 0500  Gross per 24 hour   Intake 2740 ml   Output 500 ml   Net 2240 ml       Physical Exam  Vitals reviewed.   Constitutional:       Appearance: Normal appearance.      Interventions: He is not intubated.  HENT:      Head: Normocephalic and atraumatic.      Nose: Nose normal.      Mouth/Throat:      Mouth: Mucous membranes are dry.      Pharynx: Oropharynx is clear.   Eyes:      Extraocular Movements: Extraocular movements intact.      Conjunctiva/sclera: Conjunctivae normal.      Pupils: Pupils are equal, round, and reactive to light.   Cardiovascular:      Rate and Rhythm: Normal rate.      Heart sounds: Normal heart sounds. No murmur heard.  Pulmonary:      Effort: Pulmonary effort is normal. He is not intubated.      Breath sounds: Normal breath sounds.   Abdominal:      General: Abdomen is flat. Bowel sounds are normal.      Palpations: Abdomen is soft.   Musculoskeletal:         General: Normal range of motion.      Cervical back: Normal range of motion and neck supple.      Right lower leg: No  edema.      Left lower leg: No edema.   Skin:     General: Skin is warm and dry.      Capillary Refill: Capillary refill takes less than 2 seconds.   Neurological:      General: No focal deficit present.      Mental Status: He is alert and oriented to person, place, and time.   Psychiatric:         Mood and Affect: Mood normal.         Behavior: Behavior normal.       Vents:  Vent Mode: A/C (05/01/22 0328)  Ventilator Initiated: No (04/23/22 1934)  Set Rate: 14 BPM (05/01/22 0328)  Vt Set: 480 mL (05/01/22 0328)  Pressure Support: 15 cmH20 (05/01/22 0010)  PEEP/CPAP: 5 cmH20 (05/01/22 0328)  Oxygen Concentration (%): 35 (05/01/22 0328)  Peak Airway Pressure: 18 cmH2O (05/01/22 0328)  Plateau Pressure: 20 cmH20 (03/07/22 0500)  Total Ve: 9.6 mL (05/01/22 0328)  F/VT Ratio<105 (RSBI): (!) 48.31 (05/01/22 0328)    Lines/Drains/Airways       Central Venous Catheter Line  Duration                  Hemodialysis Catheter 12/30/21 0900 right internal jugular 121 days              Drain  Duration                  Colostomy 12/18/21 1030 Descending/sigmoid  days         Gastrostomy/Enterostomy 03/09/22 1436 Percutaneous endoscopic gastrostomy (PEG) midline feeding 52 days              Airway  Duration                  Surgical Airway 04/27/22 0856 Shiley;Other (Comment) Cuffed;Long 3 days              Peripheral Intravenous Line  Duration                  Peripheral IV - Single Lumen 04/11/22 1623 18 G Anterior;Proximal;Right Forearm 19 days                    Significant Labs:    CBC/Anemia Profile:  No results for input(s): WBC, HGB, HCT, PLT, MCV, RDW, IRON, FERRITIN, RETIC, FOLATE, [id number], OCCULTBLOOD in the last 48 hours.     Chemistries:  No results for input(s): NA, K, CL, CO2, BUN, CREATININE, CALCIUM, ALBUMIN, PROT, BILITOT, ALKPHOS, ALT, AST, GLUCOSE, MG, PHOS in the last 48 hours.    Recent Lab Results         05/01/22  0612   04/30/22  2327   04/30/22  1839   04/30/22  1107        POC Glucose 88   68    139   109               Significant Imaging:  I have reviewed all pertinent imaging results/findings within the past 24 hours.    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 Will start Clindamycin today -   4/8- clinically looks better- will resume PSV today as tolerated   4/9- increased respiratory rate and tachycardic on exam.  Patient had just been turned.  This is all likely secondary to pain.  Will treat his pain and then increase his CPAP to 4 hours t.i.d. as tolerated.  4/13- to OR today  Then  -continue PSV as tolerated   4/26 - remains on continuous PSV and now doing well  4/27- once trach is changed out we can start some trach collar  Patient had a spell of tachycardia put back on the ventilator restart CPAP trials  05/01/2022 increase weaning trials    Hypertension  BP currently ok      Type 2 diabetes mellitus without complication, without long-term current use of insulin  Blood sugar tightly controlled                 Jose Urban MD  Pulmonology  [facility]

## 2022-05-01 NOTE — SUBJECTIVE & OBJECTIVE
Interval History:  Patient awake alert    Objective:     Vital Signs (Most Recent):  Temp: 97.4 °F (36.3 °C) (05/01/22 0300)  Pulse: 108 (05/01/22 0600)  Resp: (!) 21 (05/01/22 0600)  BP: (!) 110/52 (05/01/22 0600)  SpO2: 100 % (05/01/22 0600)   Vital Signs (24h Range):  Temp:  [97.4 °F (36.3 °C)-100 °F (37.8 °C)] 97.4 °F (36.3 °C)  Pulse:  [] 108  Resp:  [10-35] 21  SpO2:  [100 %] 100 %  BP: ()/(41-60) 110/52     Weight: 80.4 kg (177 lb 4 oz)  Body mass index is 24.72 kg/m².      Intake/Output Summary (Last 24 hours) at 5/1/2022 0712  Last data filed at 5/1/2022 0500  Gross per 24 hour   Intake 2740 ml   Output 500 ml   Net 2240 ml       Physical Exam  Vitals reviewed.   Constitutional:       Appearance: Normal appearance.      Interventions: He is not intubated.  HENT:      Head: Normocephalic and atraumatic.      Nose: Nose normal.      Mouth/Throat:      Mouth: Mucous membranes are dry.      Pharynx: Oropharynx is clear.   Eyes:      Extraocular Movements: Extraocular movements intact.      Conjunctiva/sclera: Conjunctivae normal.      Pupils: Pupils are equal, round, and reactive to light.   Cardiovascular:      Rate and Rhythm: Normal rate.      Heart sounds: Normal heart sounds. No murmur heard.  Pulmonary:      Effort: Pulmonary effort is normal. He is not intubated.      Breath sounds: Normal breath sounds.   Abdominal:      General: Abdomen is flat. Bowel sounds are normal.      Palpations: Abdomen is soft.   Musculoskeletal:         General: Normal range of motion.      Cervical back: Normal range of motion and neck supple.      Right lower leg: No edema.      Left lower leg: No edema.   Skin:     General: Skin is warm and dry.      Capillary Refill: Capillary refill takes less than 2 seconds.   Neurological:      General: No focal deficit present.      Mental Status: He is alert and oriented to person, place, and time.   Psychiatric:         Mood and Affect: Mood normal.         Behavior:  Behavior normal.       Vents:  Vent Mode: A/C (05/01/22 0328)  Ventilator Initiated: No (04/23/22 1934)  Set Rate: 14 BPM (05/01/22 0328)  Vt Set: 480 mL (05/01/22 0328)  Pressure Support: 15 cmH20 (05/01/22 0010)  PEEP/CPAP: 5 cmH20 (05/01/22 0328)  Oxygen Concentration (%): 35 (05/01/22 0328)  Peak Airway Pressure: 18 cmH2O (05/01/22 0328)  Plateau Pressure: 20 cmH20 (03/07/22 0500)  Total Ve: 9.6 mL (05/01/22 0328)  F/VT Ratio<105 (RSBI): (!) 48.31 (05/01/22 0328)    Lines/Drains/Airways       Central Venous Catheter Line  Duration                  Hemodialysis Catheter 12/30/21 0900 right internal jugular 121 days              Drain  Duration                  Colostomy 12/18/21 1030 Descending/sigmoid  days         Gastrostomy/Enterostomy 03/09/22 1436 Percutaneous endoscopic gastrostomy (PEG) midline feeding 52 days              Airway  Duration                  Surgical Airway 04/27/22 0856 Shiley;Other (Comment) Cuffed;Long 3 days              Peripheral Intravenous Line  Duration                  Peripheral IV - Single Lumen 04/11/22 1623 18 G Anterior;Proximal;Right Forearm 19 days                    Significant Labs:    CBC/Anemia Profile:  No results for input(s): WBC, HGB, HCT, PLT, MCV, RDW, IRON, FERRITIN, RETIC, FOLATE, [id number], OCCULTBLOOD in the last 48 hours.     Chemistries:  No results for input(s): NA, K, CL, CO2, BUN, CREATININE, CALCIUM, ALBUMIN, PROT, BILITOT, ALKPHOS, ALT, AST, GLUCOSE, MG, PHOS in the last 48 hours.    Recent Lab Results         05/01/22  0612   04/30/22  2327   04/30/22  1839   04/30/22  1107        POC Glucose 88   68   139   109               Significant Imaging:  I have reviewed all pertinent imaging results/findings within the past 24 hours.

## 2022-05-01 NOTE — PLAN OF CARE
Problem: Adult Inpatient Plan of Care  Goal: Absence of Hospital-Acquired Illness or Injury  Outcome: Ongoing, Progressing  Goal: Optimal Comfort and Wellbeing  Outcome: Ongoing, Progressing     Problem: Adjustment to Illness (Sepsis/Septic Shock)  Goal: Optimal Coping  Outcome: Ongoing, Progressing     Problem: Bleeding (Sepsis/Septic Shock)  Goal: Absence of Bleeding  Outcome: Ongoing, Progressing     Problem: Glycemic Control Impaired (Sepsis/Septic Shock)  Goal: Blood Glucose Level Within Desired Range  Outcome: Ongoing, Progressing     Problem: Nutrition Impaired (Sepsis/Septic Shock)  Goal: Optimal Nutrition Intake  Outcome: Ongoing, Progressing     Problem: Oral Intake Inadequate (Acute Kidney Injury/Impairment)  Goal: Optimal Nutrition Intake  Outcome: Ongoing, Progressing     Problem: Fall Injury Risk  Goal: Absence of Fall and Fall-Related Injury  Outcome: Ongoing, Progressing     Problem: Device-Related Complication Risk (Mechanical Ventilation, Invasive)  Goal: Optimal Device Function  Outcome: Ongoing, Progressing     Problem: Inability to Wean (Mechanical Ventilation, Invasive)  Goal: Mechanical Ventilation Liberation  Outcome: Ongoing, Progressing     Problem: Nutrition Impairment (Mechanical Ventilation, Invasive)  Goal: Optimal Nutrition Delivery  Outcome: Ongoing, Progressing     Problem: Skin and Tissue Injury (Mechanical Ventilation, Invasive)  Goal: Absence of Device-Related Skin and Tissue Injury  Outcome: Ongoing, Progressing     Problem: Device-Related Complication Risk (Artificial Airway)  Goal: Optimal Device Function  Outcome: Ongoing, Progressing     Problem: Skin and Tissue Injury (Artificial Airway)  Goal: Absence of Device-Related Skin or Tissue Injury  Outcome: Ongoing, Progressing     Problem: Device-Related Complication Risk (Hemodialysis)  Goal: Safe, Effective Therapy Delivery  Outcome: Ongoing, Progressing     Problem: Diabetes Comorbidity  Goal: Blood Glucose Level Within  Targeted Range  Outcome: Ongoing, Progressing     Problem: Gas Exchange Impaired  Goal: Optimal Gas Exchange  Outcome: Ongoing, Progressing     Problem: Adult Inpatient Plan of Care  Goal: Plan of Care Review  Outcome: Ongoing, Not Progressing  Goal: Patient-Specific Goal (Individualized)  Outcome: Ongoing, Not Progressing  Goal: Readiness for Transition of Care  Outcome: Ongoing, Not Progressing     Problem: Infection Progression (Sepsis/Septic Shock)  Goal: Absence of Infection Signs and Symptoms  Outcome: Ongoing, Not Progressing     Problem: Fluid and Electrolyte Imbalance (Acute Kidney Injury/Impairment)  Goal: Fluid and Electrolyte Balance  Outcome: Ongoing, Not Progressing     Problem: Renal Function Impairment (Acute Kidney Injury/Impairment)  Goal: Effective Renal Function  Outcome: Ongoing, Not Progressing     Problem: Communication Impairment (Mechanical Ventilation, Invasive)  Goal: Effective Communication  Outcome: Ongoing, Not Progressing     Problem: Hemodynamic Instability (Hemodialysis)  Goal: Effective Tissue Perfusion  Outcome: Ongoing, Not Progressing     Problem: Infection (Hemodialysis)  Goal: Absence of Infection Signs and Symptoms  Outcome: Ongoing, Not Progressing     Problem: Impaired Wound Healing  Goal: Optimal Wound Healing  Outcome: Ongoing, Not Progressing

## 2022-05-01 NOTE — ASSESSMENT & PLAN NOTE
Was doing trach collar over the weekend but had to be placed back on the vent earlier this week   - will treat his pain- hopefully this will improve his HR and then start back on weaning trails   4/7- aspirated yesterday - febrile this morning. Will start Clindamycin today -   4/8- clinically looks better- will resume PSV today as tolerated   4/9- increased respiratory rate and tachycardic on exam.  Patient had just been turned.  This is all likely secondary to pain.  Will treat his pain and then increase his CPAP to 4 hours t.i.d. as tolerated.  4/13- to OR today  Then  -continue PSV as tolerated   4/26 - remains on continuous PSV and now doing well  4/27- once trach is changed out we can start some trach collar  Patient had a spell of tachycardia put back on the ventilator restart CPAP trials  05/01/2022 increase weaning trials

## 2022-05-02 NOTE — ASSESSMENT & PLAN NOTE
Was doing trach collar over the weekend but had to be placed back on the vent earlier this week   - will treat his pain- hopefully this will improve his HR and then start back on weaning trails   4/7- aspirated yesterday - febrile this morning. Will start Clindamycin today -   4/8- clinically looks better- will resume PSV today as tolerated   4/9- increased respiratory rate and tachycardic on exam.  Patient had just been turned.  This is all likely secondary to pain.  Will treat his pain and then increase his CPAP to 4 hours t.i.d. as tolerated.  4/13- to OR today  Then  -continue PSV as tolerated   4/26 - remains on continuous PSV and now doing well  4/27- once trach is changed out we can start some trach collar  Patient had a spell of tachycardia put back on the ventilator restart CPAP trials  05/01/2022 increase weaning trials  05/02/2022 at T-tube trials to continuous CPAP

## 2022-05-02 NOTE — PROGRESS NOTES
[facility]  Pulmonology  Progress Note    Patient Name: Og Garcia  MRN: [id number]  Admission Date: 12/23/2021  Hospital Length of Stay: 130 days  Code Status: Prior  Attending Provider: Cecil Abernathy DO  Primary Care Provider: Hector Arndt DNP, FNP-C   Principal Problem: Denice gangrene    Subjective:     Interval History:  Patient without complaints    Objective:     Vital Signs (Most Recent):  Temp: 99.2 °F (37.3 °C) (05/02/22 0400)  Pulse: 102 (05/02/22 0325)  Resp: 20 (05/02/22 0325)  BP: (!) 117/58 (05/02/22 0519)  SpO2: 98 % (05/02/22 0325)   Vital Signs (24h Range):  Temp:  [98 °F (36.7 °C)-99.9 °F (37.7 °C)] 99.2 °F (37.3 °C)  Pulse:  [] 102  Resp:  [8-33] 20  SpO2:  [96 %-100 %] 98 %  BP: ()/(47-62) 117/58     Weight: 81 kg (178 lb 9.2 oz)  Body mass index is 24.91 kg/m².      Intake/Output Summary (Last 24 hours) at 5/2/2022 0740  Last data filed at 5/2/2022 0636  Gross per 24 hour   Intake 2920 ml   Output 475 ml   Net 2445 ml       Physical Exam  Vitals reviewed.   Constitutional:       Appearance: Normal appearance.      Interventions: He is not intubated.  HENT:      Head: Normocephalic and atraumatic.      Nose: Nose normal.      Mouth/Throat:      Mouth: Mucous membranes are dry.      Pharynx: Oropharynx is clear.   Eyes:      Extraocular Movements: Extraocular movements intact.      Conjunctiva/sclera: Conjunctivae normal.      Pupils: Pupils are equal, round, and reactive to light.   Cardiovascular:      Rate and Rhythm: Normal rate.      Heart sounds: Normal heart sounds. No murmur heard.  Pulmonary:      Effort: Pulmonary effort is normal. He is not intubated.      Breath sounds: Normal breath sounds.   Abdominal:      General: Abdomen is flat. Bowel sounds are normal.      Palpations: Abdomen is soft.   Musculoskeletal:         General: Normal range of motion.      Cervical back: Normal range of motion and neck supple.      Right lower leg:  No edema.      Left lower leg: No edema.   Skin:     General: Skin is warm and dry.      Capillary Refill: Capillary refill takes less than 2 seconds.   Neurological:      General: No focal deficit present.      Mental Status: He is alert and oriented to person, place, and time.   Psychiatric:         Mood and Affect: Mood normal.         Behavior: Behavior normal.       Vents:  Vent Mode: CPAP / PSV (05/02/22 0325)  Ventilator Initiated: No (04/23/22 1934)  Set Rate: 14 BPM (05/01/22 0800)  Vt Set: 480 mL (05/01/22 0800)  Pressure Support: 15 cmH20 (05/02/22 0325)  PEEP/CPAP: 5 cmH20 (05/02/22 0325)  Oxygen Concentration (%): 35 (05/02/22 0325)  Peak Airway Pressure: 18 cmH2O (05/02/22 0325)  Plateau Pressure: 20 cmH20 (03/07/22 0500)  Total Ve: 7 mL (05/02/22 0325)  F/VT Ratio<105 (RSBI): (!) 46.73 (05/02/22 0325)    Lines/Drains/Airways       Central Venous Catheter Line  Duration                  Hemodialysis Catheter 12/30/21 0900 right internal jugular 122 days              Drain  Duration                  Colostomy 12/18/21 1030 Descending/sigmoid  days         Gastrostomy/Enterostomy 03/09/22 1436 Percutaneous endoscopic gastrostomy (PEG) midline feeding 53 days              Airway  Duration                  Surgical Airway 04/27/22 0856 Shiley;Other (Comment) Cuffed;Long 4 days              Peripheral Intravenous Line  Duration                  Peripheral IV - Single Lumen 04/11/22 1623 18 G Anterior;Proximal;Right Forearm 20 days                    Significant Labs:    CBC/Anemia Profile:  No results for input(s): WBC, HGB, HCT, PLT, MCV, RDW, IRON, FERRITIN, RETIC, FOLATE, [id number], OCCULTBLOOD in the last 48 hours.     Chemistries:  No results for input(s): NA, K, CL, CO2, BUN, CREATININE, CALCIUM, ALBUMIN, PROT, BILITOT, ALKPHOS, ALT, AST, GLUCOSE, MG, PHOS in the last 48 hours.    Recent Lab Results         05/02/22  0520   05/01/22  2339   05/01/22  1227        POC Glucose 107   123   109                Significant Imaging:  I have reviewed all pertinent imaging results/findings within the past 24 hours.    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 Will start Clindamycin today -   4/8- clinically looks better- will resume PSV today as tolerated   4/9- increased respiratory rate and tachycardic on exam.  Patient had just been turned.  This is all likely secondary to pain.  Will treat his pain and then increase his CPAP to 4 hours t.i.d. as tolerated.  4/13- to OR today  Then  -continue PSV as tolerated   4/26 - remains on continuous PSV and now doing well  4/27- once trach is changed out we can start some trach collar  Patient had a spell of tachycardia put back on the ventilator restart CPAP trials  05/01/2022 increase weaning trials  05/02/2022 at T-tube trials to continuous CPAP    Hypertension  BP currently ok      ESRD (end stage renal disease)  Started on HD 12/30   Continue with HD per nephrology      Type 2 diabetes mellitus without complication, without long-term current use of insulin  Blood sugar tightly controlled    Acute blood loss anemia  No active bleeding  Hgb 6.0 on 4/11  Patient transfused 1 unit with dialysis yesterday  4/13 -H/H 7.5/22.7  4/15- hgb is 6.2 today. We are going to give another unit of PRBC's today with dialysis  4/16 post transfusion cbc is pending  4/18- hgb is 6.6. Will plan for transfusion with 1 unit of prbc's with next HD  4/21 - 7.2/21.6   4/25- 6.6/19.7 - will transfuse one unit PRBCs with next HD   4/28- Hgb now 8.0 following transfusion  5/2/2022 hemoglobin 7 no need for transfusion yet                 Jose Urban MD  Pulmonology  [facility]

## 2022-05-02 NOTE — ASSESSMENT & PLAN NOTE
No active bleeding  Hgb 6.0 on 4/11  Patient transfused 1 unit with dialysis yesterday  4/13 -H/H 7.5/22.7  4/15- hgb is 6.2 today. We are going to give another unit of PRBC's today with dialysis  4/16 post transfusion cbc is pending  4/18- hgb is 6.6. Will plan for transfusion with 1 unit of prbc's with next HD  4/21 - 7.2/21.6   4/25- 6.6/19.7 - will transfuse one unit PRBCs with next HD   4/28- Hgb now 8.0 following transfusion  5/2/2022 hemoglobin 7 no need for transfusion yet

## 2022-05-02 NOTE — PROGRESS NOTES
[facility]  Adult Nutrition  Follow-up Note         Reason for Assessment  Reason For Assessment: RD follow-up  Nutrition Risk Screen: tube feeding or parenteral nutrition  Malnutrition  Is Patient Malnourished: No  Nutrition Diagnosis  Inadequate oral intake   related to Decreased ability to consume sufficient energy as evidenced by pt on vent    Nutrition Diagnosis Status: Chronic/ continues      Nutrition Risk  Level of Risk/Frequency of Follow-up: high   Chewing or Swallowing Difficulty?: Swallowing difficulty  Estimated/Assessed Needs  RMR ([place]-St. Jeor Equation): 1607.13 Activity Factor: 1 Injury Factor: 1.2   Total Ve: 11 mL Temp: 99.3 °F (37.4 °C)Axillary  Weight Used For Calorie Calculations: 81 kg (178 lb 9.2 oz)   Energy Need Method: [place] (modified) Energy Calorie Requirements (kcal): 1710  Weight Used For Protein Calculations: 83.3 kg (183 lb 10.3 oz)  Protein Requirements: 100-125  Estimated Fluid Requirement Method: RDA Method Fluid Requirements (mL): 2105  RDA Method (mL): 1710     Nutrition Prescription / Recommendations  Recommendation/Intervention: PEG tube feeding: Nepro @ 60ml/hr; H20 flush of 50ml q 4hr  Goals: pt will meet est nutr needs; wound healing ; TF tolerance  Nutrition Goal Status: progressing towards goal  Communication of RD Recs: reviewed with physician  Current Diet Order: PEG tube feeding: Nepro @ 60ml/hr; H20 flush of 50ml q 4hr  Nutrition Order Comments: PEG tube feeding:Nepro @ 60ml/hr; H20 flush of 50ml q 4hr  Current Nutrition Support Formula Ordered: Nepro  Current Nutrition Support Rate Ordered: 60 (ml)  Current Nutrition Support Frequency Ordered: hourly  Recommended Diet: Enteral Nutrition PEG tube feeding: Nepro @ 60ml/hr; H20 flush of 50ml q 4hr  Recommended Oral Supplement: No Oral Supplements  Is Nutrition Support Recommended: No  Is Education Recommended: No  Monitor and Evaluation  % current Intake: Enteral Nutrition at goal  % intake  to meet estimated needs: Enteral Nutrition   Food and Nutrient Intake: enteral nutrition intake  Food and Nutrient Adminstration: enteral and parenteral nutrition administration  Anthropometric Measurements: height/length, weight, weight change, body mass index  Biochemical Data, Medical Tests and Procedures: electrolyte and renal panel, glucose/endocrine profile  Nutrition-Focused Physical Findings: skin  Enteral Calories (kcal): 2592  Enteral Protein (gm): 115  Enteral (Free Water) Fluid (mL): 1037  Free Water Flush Fluid (mL): 300  Parenteral Calories (kcal): 845  Parenteral Protein (gm): 48  Parenteral Fluid (mL): 960  Lipid Calories (kcals): 500 kcals  Other Calories (kcal): 528 (propofol)  Total Calories (kcal): 2160  Total Calories (kcal/kg): 1337  % Kcal Needs: 100  Total Protein (gm): 135  % Protein Needs: 100  IV Fluid (mL): 1764  Total Fluid Intake (mL): 1337  Energy Calories Required: meeting needs  Protein Required: meeting needs  Fluid Required: meeting needs  Tolerance: tolerating  Current Medical Diagnosis and Past Medical History  Diagnosis: gastrointestinal disease, infection/sepsis  Past Medical History:   Diagnosis Date    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    Hyperlipidemia     Hypertension     On mechanically assisted ventilation 12/14/2021     Nutrition/Diet History  Spiritual, Cultural Beliefs, Mosque Practices, Values that Affect Care: no  Food Allergies: NKFA  Factors Affecting Nutritional Intake: None identified at this time  Lab/Procedures/Meds  Recent Labs   Lab 05/02/22  0637   *   K 3.4*   BUN 71*   CREATININE 2.32*   GLU 98   CALCIUM 8.1*   CL 96*     Last A1c: No results found for: HGBA1C  Lab Results   Component Value Date    RBC 2.01 (L) 05/02/2022    HGB 6.3 (L) 05/02/2022    HCT 18.8 (L) 05/02/2022    MCV 93.5 05/02/2022    MCH 31.3 (H) 05/02/2022    MCHC 33.5 05/02/2022     Pertinent Labs Reviewed: reviewed  Pertinent Labs Comments: Na 133  Pertinent Medications  "Reviewed: reviewed  Pertinent Medications Comments: heparin, insulin  Anthropometrics  Temp: 99.3 °F (37.4 °C)  Height Method: Stated  Height: 5' 11" (180.3 cm)  Height (inches): 71 in  Weight Method: Bed Scale  Weight: 81 kg (178 lb 9.2 oz)  Weight (lb): 178.57 lb  Ideal Body Weight (IBW), Male: 172 lb  % Ideal Body Weight, Male (lb): 106.77 %  BMI (Calculated): 24.9  BMI Grade: 25 - 29.9 - overweight     Nutrition by Nursing  Diet/Nutrition Received: tube feeding  Intake (%): 100%  Diet/Feeding Assistance: total feed  Diet/Feeding Tolerance: good  Last Bowel Movement: 05/02/22  [REMOVED]      NG/OG Tube Bullock sump 18 Fr. Left nostril-Feeding Type: continuous, by pump       Gastrostomy/Enterostomy 03/09/22 1436 Percutaneous endoscopic gastrostomy (PEG) midline feeding-Feeding Type: continuous, by pump  [REMOVED]      NG/OG Tube Bullock sump 18 Fr. Left nostril-Current Rate (mL/hr): 50 mL/hr       Gastrostomy/Enterostomy 03/09/22 1436 Percutaneous endoscopic gastrostomy (PEG) midline feeding-Current Rate (mL/hr): 45 mL/hr  [REMOVED]      NG/OG Tube Bullock sump 18 Fr. Left nostril-Goal Rate (mL/hr): 75 mL/hr       Gastrostomy/Enterostomy 03/09/22 1436 Percutaneous endoscopic gastrostomy (PEG) midline feeding-Goal Rate (mL/hr): 45 mL/hr  [REMOVED]      NG/OG Tube Bullock sump 18 Fr. Left nostril-Formula Name: nepro 1.8       Gastrostomy/Enterostomy 03/09/22 1436 Percutaneous endoscopic gastrostomy (PEG) midline feeding-Formula Name: Nepro 1.8  Nutrition Follow-Up  RD Follow-up?: Yes  Assessment and Plan  No new Assessment & Plan notes have been filed under this hospital service since the last note was generated.  Service: Nutrition     "

## 2022-05-02 NOTE — PLAN OF CARE
Problem: Adult Inpatient Plan of Care  Goal: Plan of Care Review  Outcome: Ongoing, Progressing  Goal: Patient-Specific Goal (Individualized)  Outcome: Ongoing, Progressing  Goal: Absence of Hospital-Acquired Illness or Injury  Outcome: Ongoing, Progressing  Goal: Optimal Comfort and Wellbeing  Outcome: Ongoing, Progressing   Patient will be tolerating trach collar prior to discharge.

## 2022-05-02 NOTE — SUBJECTIVE & OBJECTIVE
Interval History:  Patient without complaints    Objective:     Vital Signs (Most Recent):  Temp: 99.2 °F (37.3 °C) (05/02/22 0400)  Pulse: 102 (05/02/22 0325)  Resp: 20 (05/02/22 0325)  BP: (!) 117/58 (05/02/22 0519)  SpO2: 98 % (05/02/22 0325)   Vital Signs (24h Range):  Temp:  [98 °F (36.7 °C)-99.9 °F (37.7 °C)] 99.2 °F (37.3 °C)  Pulse:  [] 102  Resp:  [8-33] 20  SpO2:  [96 %-100 %] 98 %  BP: ()/(47-62) 117/58     Weight: 81 kg (178 lb 9.2 oz)  Body mass index is 24.91 kg/m².      Intake/Output Summary (Last 24 hours) at 5/2/2022 0740  Last data filed at 5/2/2022 0636  Gross per 24 hour   Intake 2920 ml   Output 475 ml   Net 2445 ml       Physical Exam  Vitals reviewed.   Constitutional:       Appearance: Normal appearance.      Interventions: He is not intubated.  HENT:      Head: Normocephalic and atraumatic.      Nose: Nose normal.      Mouth/Throat:      Mouth: Mucous membranes are dry.      Pharynx: Oropharynx is clear.   Eyes:      Extraocular Movements: Extraocular movements intact.      Conjunctiva/sclera: Conjunctivae normal.      Pupils: Pupils are equal, round, and reactive to light.   Cardiovascular:      Rate and Rhythm: Normal rate.      Heart sounds: Normal heart sounds. No murmur heard.  Pulmonary:      Effort: Pulmonary effort is normal. He is not intubated.      Breath sounds: Normal breath sounds.   Abdominal:      General: Abdomen is flat. Bowel sounds are normal.      Palpations: Abdomen is soft.   Musculoskeletal:         General: Normal range of motion.      Cervical back: Normal range of motion and neck supple.      Right lower leg: No edema.      Left lower leg: No edema.   Skin:     General: Skin is warm and dry.      Capillary Refill: Capillary refill takes less than 2 seconds.   Neurological:      General: No focal deficit present.      Mental Status: He is alert and oriented to person, place, and time.   Psychiatric:         Mood and Affect: Mood normal.         Behavior:  Behavior normal.       Vents:  Vent Mode: CPAP / PSV (05/02/22 0325)  Ventilator Initiated: No (04/23/22 1934)  Set Rate: 14 BPM (05/01/22 0800)  Vt Set: 480 mL (05/01/22 0800)  Pressure Support: 15 cmH20 (05/02/22 0325)  PEEP/CPAP: 5 cmH20 (05/02/22 0325)  Oxygen Concentration (%): 35 (05/02/22 0325)  Peak Airway Pressure: 18 cmH2O (05/02/22 0325)  Plateau Pressure: 20 cmH20 (03/07/22 0500)  Total Ve: 7 mL (05/02/22 0325)  F/VT Ratio<105 (RSBI): (!) 46.73 (05/02/22 0325)    Lines/Drains/Airways       Central Venous Catheter Line  Duration                  Hemodialysis Catheter 12/30/21 0900 right internal jugular 122 days              Drain  Duration                  Colostomy 12/18/21 1030 Descending/sigmoid  days         Gastrostomy/Enterostomy 03/09/22 1436 Percutaneous endoscopic gastrostomy (PEG) midline feeding 53 days              Airway  Duration                  Surgical Airway 04/27/22 0856 Shiley;Other (Comment) Cuffed;Long 4 days              Peripheral Intravenous Line  Duration                  Peripheral IV - Single Lumen 04/11/22 1623 18 G Anterior;Proximal;Right Forearm 20 days                    Significant Labs:    CBC/Anemia Profile:  No results for input(s): WBC, HGB, HCT, PLT, MCV, RDW, IRON, FERRITIN, RETIC, FOLATE, [id number], OCCULTBLOOD in the last 48 hours.     Chemistries:  No results for input(s): NA, K, CL, CO2, BUN, CREATININE, CALCIUM, ALBUMIN, PROT, BILITOT, ALKPHOS, ALT, AST, GLUCOSE, MG, PHOS in the last 48 hours.    Recent Lab Results         05/02/22  0520   05/01/22  2339   05/01/22  1227        POC Glucose 107   123   109               Significant Imaging:  I have reviewed all pertinent imaging results/findings within the past 24 hours.

## 2022-05-03 NOTE — ASSESSMENT & PLAN NOTE
No active bleeding  Hgb 6.0 on 4/11  Patient transfused 1 unit with dialysis yesterday  4/13 -H/H 7.5/22.7  4/15- hgb is 6.2 today. We are going to give another unit of PRBC's today with dialysis  4/16 post transfusion cbc is pending  4/18- hgb is 6.6. Will plan for transfusion with 1 unit of prbc's with next HD  4/21 - 7.2/21.6   4/25- 6.6/19.7 - will transfuse one unit PRBCs with next HD   4/28- Hgb now 8.0 following transfusion  5/2/2022 hemoglobin 7 no need for transfusion yet  5/3/2022 transfuse 1 unit packed red blood cells

## 2022-05-03 NOTE — PT/OT/SLP PROGRESS
Physical Therapy Screen          PT screen completed. 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 Pt is currently not appropriate for PT.        Mayuri Murphy, PT, DPT

## 2022-05-03 NOTE — PROGRESS NOTES
[facility]  Nephrology  Progress Note    Patient Name: Og Garcia  MRN: [id number]  Admission Date: 12/23/2021  Hospital Length of Stay: 130 days  Attending Provider: Cecil Abernathy DO   Primary Care Physician: Hector Arndt DNP, FNP-C  Principal Problem:Denice gangrene    Subjective:     HPI: The patient is known from the previous nephrology consult at [facility].  He is no at Specialty and continues to need dialysis support.      Interval History: The patient is resting.  No other changes.  Continue with dialysis as scheduled.    Review of patient's allergies indicates:  No Known Allergies  Current Facility-Administered Medications   Medication Frequency    0.9%  NaCl infusion (for blood administration) Q24H PRN    0.9%  NaCl infusion PRN    acetaminophen tablet 500 mg Q6H PRN    albuterol nebulizer solution 2.5 mg Q4H PRN    albuterol-ipratropium 2.5 mg-0.5 mg/3 mL nebulizer solution 3 mL Q6H    bisacodyL EC tablet 10 mg Daily PRN    calcium gluconate 1 g in dextrose 5 % in water (D5W) 5 % 100 mL IVPB (MB+) Once    dextrose 50% injection 12.5 g PRN    diltiaZEM tablet 90 mg Q6H    glucagon (human recombinant) injection 1 mg PRN    guaiFENesin 100 mg/5 ml syrup 200 mg Q6H    heparin (porcine) injection 4,000 Units PRN    heparin (porcine) injection 5,000 Units Q8H    insulin aspart U-100 injection 1-10 Units Q6H    insulin detemir U-100 injection 25 Units QHS    metoprolol injection 5 mg Q4H PRN    metoprolol tartrate (LOPRESSOR) split tablet 12.5 mg BID    ondansetron injection 8 mg Q6H PRN    pantoprazole suspension 40 mg Daily    predniSONE tablet 5 mg Daily    sevelamer carbonate pwpk 0.8 g TID WM    silver sulfADIAZINE 1% cream Daily PRN    simethicone chewable tablet 80 mg TID PRN    sodium chloride 0.9% bolus 250 mL PRN       Objective:     Vital Signs (Most Recent):  Temp: 99.7 °F (37.6 °C) (05/02/22 1600)  Pulse: 102 (05/02/22 1923)  Resp: 14  (05/02/22 1923)  BP: (!) 116/59 (05/02/22 1830)  SpO2: 100 % (05/02/22 1923)  O2 Device (Oxygen Therapy): Trach Collar (pt tolerated t-collar well placed back on cpap at this time) (05/02/22 1632)   Vital Signs (24h Range):  Temp:  [98.3 °F (36.8 °C)-99.7 °F (37.6 °C)] 99.7 °F (37.6 °C)  Pulse:  [] 102  Resp:  [7-35] 14  SpO2:  [95 %-100 %] 100 %  BP: ()/(38-64) 116/59     Weight: 81 kg (178 lb 9.2 oz) (05/02/22 0400)  Body mass index is 24.91 kg/m².  Body surface area is 2.01 meters squared.    I/O last 3 completed shifts:  In: 3400 [P.O.:960; NG/GT:2340; IV Piggyback:100]  Out: 2825 [Other:2000; Stool:825]    Physical Exam  Vitals reviewed.   HENT:      Head: Normocephalic.   Cardiovascular:      Rate and Rhythm: Regular rhythm.   Pulmonary:      Comments: Remains ventilated  Abdominal:      Palpations: Abdomen is soft.   Neurological:      Mental Status: He is alert.       Significant Labs:  BMP:   Recent Labs   Lab 05/02/22  0637   GLU 98   *   K 3.4*   CL 96*   CO2 28   BUN 71*   CREATININE 2.32*   CALCIUM 8.1*     CBC:   Recent Labs   Lab 05/02/22  0637   WBC 13.56*   RBC 2.01*   HGB 6.3*   HCT 18.8*   *   MCV 93.5   MCH 31.3*   MCHC 33.5        Significant Imaging:  Labs: Reviewed    Assessment/Plan:     * Denice gangrene  Continue wound care  Managed by wound care  Wound care    ESRD (end stage renal disease)  Dialysis MWF      Hypertension  Blood pressure borderline low.  He is requiring diltiazem for Afib    Chronic condition        Thank you for your consult. I will follow-up with patient. Please contact us if you have any additional questions.    Edwin Pryor Jr, MD  Nephrology  [facility]

## 2022-05-03 NOTE — PT/OT/SLP PROGRESS
Occupational Therapy Screen      Patient Name:  Og Garcia   MRN:  [id number]     OT screen performed instead of formal evaluation.Pt is able to make eye contact on (L) side,but is unable to follow any commands. Pt presents with increased tone (B) UE. Positioning provided with pillows for arms and towel roll for hands,Nurse notified. Pt was D/C form skilled OT on 4/1/22 due to lack of progress during 1 week trial and inability to follow commands. No significant improvement noted during screen. No skilled OT indicated    5/3/2022

## 2022-05-03 NOTE — SUBJECTIVE & OBJECTIVE
Interval History: The patient is resting.  He is currently receiving blood.  No other changes.    Review of patient's allergies indicates:  No Known Allergies  Current Facility-Administered Medications   Medication Frequency    0.9%  NaCl infusion (for blood administration) Q24H PRN    0.9%  NaCl infusion (for blood administration) Q24H PRN    0.9%  NaCl infusion PRN    acetaminophen tablet 500 mg Q6H PRN    albuterol nebulizer solution 2.5 mg Q4H PRN    albuterol-ipratropium 2.5 mg-0.5 mg/3 mL nebulizer solution 3 mL Q6H    bisacodyL EC tablet 10 mg Daily PRN    calcium gluconate 1 g in dextrose 5 % in water (D5W) 5 % 100 mL IVPB (MB+) Once    dextrose 50% injection 12.5 g PRN    diltiaZEM tablet 90 mg Q6H    glucagon (human recombinant) injection 1 mg PRN    guaiFENesin 100 mg/5 ml syrup 200 mg Q6H    heparin (porcine) injection 4,000 Units PRN    heparin (porcine) injection 5,000 Units Q8H    insulin aspart U-100 injection 1-10 Units Q6H    insulin detemir U-100 injection 25 Units QHS    metoprolol injection 5 mg Q4H PRN    metoprolol tartrate (LOPRESSOR) split tablet 12.5 mg BID    ondansetron injection 8 mg Q6H PRN    pantoprazole suspension 40 mg Daily    predniSONE tablet 5 mg Daily    sevelamer carbonate pwpk 0.8 g TID WM    silver sulfADIAZINE 1% cream Daily PRN    simethicone chewable tablet 80 mg TID PRN    sodium chloride 0.9% bolus 250 mL PRN       Objective:     Vital Signs (Most Recent):  Temp: 99.4 °F (37.4 °C) (05/03/22 1330)  Pulse: 100 (05/03/22 1345)  Resp: 18 (05/03/22 1345)  BP: (!) 87/50 (05/03/22 1345)  SpO2: 98 % (05/03/22 1345)  O2 Device (Oxygen Therapy): Trach Collar (05/03/22 1218)   Vital Signs (24h Range):  Temp:  [97.7 °F (36.5 °C)-100.1 °F (37.8 °C)] 99.4 °F (37.4 °C)  Pulse:  [] 100  Resp:  [8-47] 18  SpO2:  [77 %-100 %] 98 %  BP: ()/(38-82) 87/50     Weight: 81 kg (178 lb 9.2 oz) (05/02/22 0400)  Body mass index is 24.91 kg/m².  Body surface area is 2.01 meters  squared.    I/O last 3 completed shifts:  In: 2530 [P.O.:360; NG/GT:2070; IV Piggyback:100]  Out: 2475 [Other:2000; Stool:475]    Physical Exam  Vitals reviewed.   HENT:      Head: Normocephalic and atraumatic.   Cardiovascular:      Rate and Rhythm: Regular rhythm.      Pulses: Normal pulses.   Pulmonary:      Effort: Pulmonary effort is normal.      Comments: ventilated  Abdominal:      Palpations: Abdomen is soft.   Skin:     General: Skin is warm.   Neurological:      Mental Status: He is alert.       Significant Labs:  BMP:   Recent Labs   Lab 05/02/22  0637   GLU 98   *   K 3.4*   CL 96*   CO2 28   BUN 71*   CREATININE 2.32*   CALCIUM 8.1*     CBC:   Recent Labs   Lab 05/02/22  0637   WBC 13.56*   RBC 2.01*   HGB 6.3*   HCT 18.8*   *   MCV 93.5   MCH 31.3*   MCHC 33.5        Significant Imaging:  Labs: Reviewed

## 2022-05-03 NOTE — SUBJECTIVE & OBJECTIVE
Interval History: The patient is resting.  No other changes.  Continue with dialysis as scheduled.    Review of patient's allergies indicates:  No Known Allergies  Current Facility-Administered Medications   Medication Frequency    0.9%  NaCl infusion (for blood administration) Q24H PRN    0.9%  NaCl infusion PRN    acetaminophen tablet 500 mg Q6H PRN    albuterol nebulizer solution 2.5 mg Q4H PRN    albuterol-ipratropium 2.5 mg-0.5 mg/3 mL nebulizer solution 3 mL Q6H    bisacodyL EC tablet 10 mg Daily PRN    calcium gluconate 1 g in dextrose 5 % in water (D5W) 5 % 100 mL IVPB (MB+) Once    dextrose 50% injection 12.5 g PRN    diltiaZEM tablet 90 mg Q6H    glucagon (human recombinant) injection 1 mg PRN    guaiFENesin 100 mg/5 ml syrup 200 mg Q6H    heparin (porcine) injection 4,000 Units PRN    heparin (porcine) injection 5,000 Units Q8H    insulin aspart U-100 injection 1-10 Units Q6H    insulin detemir U-100 injection 25 Units QHS    metoprolol injection 5 mg Q4H PRN    metoprolol tartrate (LOPRESSOR) split tablet 12.5 mg BID    ondansetron injection 8 mg Q6H PRN    pantoprazole suspension 40 mg Daily    predniSONE tablet 5 mg Daily    sevelamer carbonate pwpk 0.8 g TID WM    silver sulfADIAZINE 1% cream Daily PRN    simethicone chewable tablet 80 mg TID PRN    sodium chloride 0.9% bolus 250 mL PRN       Objective:     Vital Signs (Most Recent):  Temp: 99.7 °F (37.6 °C) (05/02/22 1600)  Pulse: 102 (05/02/22 1923)  Resp: 14 (05/02/22 1923)  BP: (!) 116/59 (05/02/22 1830)  SpO2: 100 % (05/02/22 1923)  O2 Device (Oxygen Therapy): Trach Collar (pt tolerated t-collar well placed back on cpap at this time) (05/02/22 1632)   Vital Signs (24h Range):  Temp:  [98.3 °F (36.8 °C)-99.7 °F (37.6 °C)] 99.7 °F (37.6 °C)  Pulse:  [] 102  Resp:  [7-35] 14  SpO2:  [95 %-100 %] 100 %  BP: ()/(38-64) 116/59     Weight: 81 kg (178 lb 9.2 oz) (05/02/22 0400)  Body mass index is 24.91 kg/m².  Body surface area is 2.01  meters squared.    I/O last 3 completed shifts:  In: 3400 [P.O.:960; NG/GT:2340; IV Piggyback:100]  Out: 2825 [Other:2000; Stool:825]    Physical Exam  Vitals reviewed.   HENT:      Head: Normocephalic.   Cardiovascular:      Rate and Rhythm: Regular rhythm.   Pulmonary:      Comments: Remains ventilated  Abdominal:      Palpations: Abdomen is soft.   Neurological:      Mental Status: He is alert.       Significant Labs:  BMP:   Recent Labs   Lab 05/02/22  0637   GLU 98   *   K 3.4*   CL 96*   CO2 28   BUN 71*   CREATININE 2.32*   CALCIUM 8.1*     CBC:   Recent Labs   Lab 05/02/22  0637   WBC 13.56*   RBC 2.01*   HGB 6.3*   HCT 18.8*   *   MCV 93.5   MCH 31.3*   MCHC 33.5        Significant Imaging:  Labs: Reviewed

## 2022-05-03 NOTE — NURSING
Pt resting will open eyes to verbal stimuli.. does not follow any commands   Pt on cpap at this time .. appear to be tolerating .. total care .. assessements done .. no discomforts noted safety measures ongoing.. call bell within reach

## 2022-05-03 NOTE — PROGRESS NOTES
[facility]  Pulmonology  Progress Note    Patient Name: Og Garcia  MRN: [id number]  Admission Date: 12/23/2021  Hospital Length of Stay: 131 days  Code Status: Prior  Attending Provider: Cecil Abernathy DO  Primary Care Provider: Hector Arndt DNP, FNP-C   Principal Problem: Denice gangrene    Subjective:     Interval History:  Patient awake    Objective:     Vital Signs (Most Recent):  Temp: 99.2 °F (37.3 °C) (05/03/22 0400)  Pulse: 110 (05/03/22 0600)  Resp: 15 (05/03/22 0600)  BP: (!) 99/54 (05/03/22 0600)  SpO2: 100 % (05/03/22 0600)   Vital Signs (24h Range):  Temp:  [97.9 °F (36.6 °C)-100.1 °F (37.8 °C)] 99.2 °F (37.3 °C)  Pulse:  [] 110  Resp:  [7-47] 15  SpO2:  [77 %-100 %] 100 %  BP: ()/(38-82) 99/54     Weight: 81 kg (178 lb 9.2 oz)  Body mass index is 24.91 kg/m².      Intake/Output Summary (Last 24 hours) at 5/3/2022 0622  Last data filed at 5/3/2022 0600  Gross per 24 hour   Intake 2530 ml   Output 2475 ml   Net 55 ml       Physical Exam    Vents:  Vent Mode: CPAP / PSV (05/03/22 0051)  Ventilator Initiated: No (04/23/22 1934)  Set Rate: 0 BPM (05/03/22 0051)  Vt Set: 0 mL (05/03/22 0051)  Pressure Support: 15 cmH20 (05/03/22 0051)  PEEP/CPAP: 5 cmH20 (05/03/22 0051)  Oxygen Concentration (%): 35 (05/03/22 0400)  Peak Airway Pressure: 21 cmH2O (05/03/22 0051)  Plateau Pressure: 20 cmH20 (03/07/22 0500)  Total Ve: 12.2 mL (05/03/22 0051)  F/VT Ratio<105 (RSBI): (!) 41.85 (05/03/22 0051)    Lines/Drains/Airways       Central Venous Catheter Line  Duration                  Hemodialysis Catheter 12/30/21 0900 right internal jugular 123 days              Drain  Duration                  Colostomy 12/18/21 1030 Descending/sigmoid  days         Gastrostomy/Enterostomy 03/09/22 1436 Percutaneous endoscopic gastrostomy (PEG) midline feeding 54 days              Airway  Duration                  Surgical Airway 04/27/22 0856 Mary[place]uy;Other (Comment)  Cuffed;Long 5 days              Peripheral Intravenous Line  Duration                  Peripheral IV - Single Lumen 04/11/22 1623 18 G Anterior;Proximal;Right Forearm 21 days                    Significant Labs:    CBC/Anemia Profile:  Recent Labs   Lab 05/02/22  0637   WBC 13.56*   HGB 6.3*   HCT 18.8*   *   MCV 93.5   RDW 15.7*        Chemistries:  Recent Labs   Lab 05/02/22  0637   *   K 3.4*   CL 96*   CO2 28   BUN 71*   CREATININE 2.32*   CALCIUM 8.1*       Recent Lab Results  (Last 5 results in the past 24 hours)        05/03/22  0558   05/03/22  0008   05/02/22  1742   05/02/22  1245   05/02/22  0637        Anion Gap         13       Aniso         1+       Bands         10       Baso #         0.05       Basophil %         0.4                1       BUN         71       BUN/CREAT RATIO         31       Calcium         8.1       Chloride         96       CO2         28       Creatinine         2.32       Differential Type         Manual       eGFR if non          30       Eos #         0.88       Eosinophil %         6.5                8       Glucose         98       Hematocrit         18.8       Hemoglobin         6.3       Immature Grans (Abs)         0.74       Immature Granulocytes         5.5       Lymph #         2.46       Lymph %         18.1                15       MCH         31.3       MCHC         33.5       MCV         93.5       Metamyelocytes         2       Mono #         1.49       Mono %         11.0                8       MPV         8.6       Myelocytes         1       Neutrophils, Abs         7.94       Neutrophils Relative         58.5       nRBC         0.0       NUCLEATED RBC ABSOLUTE         0.00       PLATELET MORPHOLOGY         Increased       Platelets         445       POC Glucose 112   144   137   114         Poly         Few       Potassium         3.4       RBC         2.01       RDW         15.7       Segmented Neutrophils, Man %         55        Sodium         134       Tobramycin, Random         0.9       Vancomycin, Random         10.3       WBC         13.56                              Significant Imaging:  I have reviewed all pertinent imaging results/findings within the past 24 hours.    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 Will start Clindamycin today -   4/8- clinically looks better- will resume PSV today as tolerated   4/9- increased respiratory rate and tachycardic on exam.  Patient had just been turned.  This is all likely secondary to pain.  Will treat his pain and then increase his CPAP to 4 hours t.i.d. as tolerated.  4/13- to OR today  Then  -continue PSV as tolerated   4/26 - remains on continuous PSV and now doing well  4/27- once trach is changed out we can start some trach collar  Patient had a spell of tachycardia put back on the ventilator restart CPAP trials  05/01/2022 increase weaning trials  05/02/2022 at T-tube trials to continuous CPAP  05/03/2022 tolerating trach collar trials reasonably well    Hypertension  BP currently ok      ESRD (end stage renal disease)  Started on HD 12/30   Continue with HD per nephrology      Type 2 diabetes mellitus without complication, without long-term current use of insulin  Blood sugar tightly controlled    Acute blood loss anemia  No active bleeding  Hgb 6.0 on 4/11  Patient transfused 1 unit with dialysis yesterday  4/13 -H/H 7.5/22.7  4/15- hgb is 6.2 today. We are going to give another unit of PRBC's today with dialysis  4/16 post transfusion cbc is pending  4/18- hgb is 6.6. Will plan for transfusion with 1 unit of prbc's with next HD  4/21 - 7.2/21.6   4/25- 6.6/19.7 - will  transfuse one unit PRBCs with next HD   4/28- Hgb now 8.0 following transfusion  5/2/2022 hemoglobin 7 no need for transfusion yet  5/3/2022 transfuse 1 unit packed red blood cells                 Jose Urban MD  Pulmonology  [facility]

## 2022-05-03 NOTE — PROGRESS NOTES
[facility]  Nephrology  Progress Note    Patient Name: Og Garcia  MRN: [id number]  Admission Date: 12/23/2021  Hospital Length of Stay: 131 days  Attending Provider: Cecil Abernathy DO   Primary Care Physician: Hector Arndt DNP, FNP-C  Principal Problem:Denice gangrene    Subjective:     HPI: The patient is known from the previous nephrology consult at [facility].  He is no at Specialty and continues to need dialysis support.      Interval History: The patient is resting.  He is currently receiving blood.  No other changes.    Review of patient's allergies indicates:  No Known Allergies  Current Facility-Administered Medications   Medication Frequency    0.9%  NaCl infusion (for blood administration) Q24H PRN    0.9%  NaCl infusion (for blood administration) Q24H PRN    0.9%  NaCl infusion PRN    acetaminophen tablet 500 mg Q6H PRN    albuterol nebulizer solution 2.5 mg Q4H PRN    albuterol-ipratropium 2.5 mg-0.5 mg/3 mL nebulizer solution 3 mL Q6H    bisacodyL EC tablet 10 mg Daily PRN    calcium gluconate 1 g in dextrose 5 % in water (D5W) 5 % 100 mL IVPB (MB+) Once    dextrose 50% injection 12.5 g PRN    diltiaZEM tablet 90 mg Q6H    glucagon (human recombinant) injection 1 mg PRN    guaiFENesin 100 mg/5 ml syrup 200 mg Q6H    heparin (porcine) injection 4,000 Units PRN    heparin (porcine) injection 5,000 Units Q8H    insulin aspart U-100 injection 1-10 Units Q6H    insulin detemir U-100 injection 25 Units QHS    metoprolol injection 5 mg Q4H PRN    metoprolol tartrate (LOPRESSOR) split tablet 12.5 mg BID    ondansetron injection 8 mg Q6H PRN    pantoprazole suspension 40 mg Daily    predniSONE tablet 5 mg Daily    sevelamer carbonate pwpk 0.8 g TID WM    silver sulfADIAZINE 1% cream Daily PRN    simethicone chewable tablet 80 mg TID PRN    sodium chloride 0.9% bolus 250 mL PRN       Objective:     Vital Signs (Most Recent):  Temp: 99.4 °F (37.4 °C)  (05/03/22 1330)  Pulse: 100 (05/03/22 1345)  Resp: 18 (05/03/22 1345)  BP: (!) 87/50 (05/03/22 1345)  SpO2: 98 % (05/03/22 1345)  O2 Device (Oxygen Therapy): Trach Collar (05/03/22 1218)   Vital Signs (24h Range):  Temp:  [97.7 °F (36.5 °C)-100.1 °F (37.8 °C)] 99.4 °F (37.4 °C)  Pulse:  [] 100  Resp:  [8-47] 18  SpO2:  [77 %-100 %] 98 %  BP: ()/(38-82) 87/50     Weight: 81 kg (178 lb 9.2 oz) (05/02/22 0400)  Body mass index is 24.91 kg/m².  Body surface area is 2.01 meters squared.    I/O last 3 completed shifts:  In: 2530 [P.O.:360; NG/GT:2070; IV Piggyback:100]  Out: 2475 [Other:2000; Stool:475]    Physical Exam  Vitals reviewed.   HENT:      Head: Normocephalic and atraumatic.   Cardiovascular:      Rate and Rhythm: Regular rhythm.      Pulses: Normal pulses.   Pulmonary:      Effort: Pulmonary effort is normal.      Comments: ventilated  Abdominal:      Palpations: Abdomen is soft.   Skin:     General: Skin is warm.   Neurological:      Mental Status: He is alert.       Significant Labs:  BMP:   Recent Labs   Lab 05/02/22  0637   GLU 98   *   K 3.4*   CL 96*   CO2 28   BUN 71*   CREATININE 2.32*   CALCIUM 8.1*     CBC:   Recent Labs   Lab 05/02/22  0637   WBC 13.56*   RBC 2.01*   HGB 6.3*   HCT 18.8*   *   MCV 93.5   MCH 31.3*   MCHC 33.5        Significant Imaging:  Labs: Reviewed    Assessment/Plan:     ESRD (end stage renal disease)  Dialysis MWF  5/3/2022 Continue with scheduled hemodialysis for this patient.      Acute blood loss anemia  1.  The patient is receiving PRBCs.        Thank you for your consult. I will follow-up with patient. Please contact us if you have any additional questions.    Edwin Pryor Jr, MD  Nephrology  [facility]

## 2022-05-03 NOTE — ASSESSMENT & PLAN NOTE
Was doing trach collar over the weekend but had to be placed back on the vent earlier this week   - will treat his pain- hopefully this will improve his HR and then start back on weaning trails   4/7- aspirated yesterday - febrile this morning. Will start Clindamycin today -   4/8- clinically looks better- will resume PSV today as tolerated   4/9- increased respiratory rate and tachycardic on exam.  Patient had just been turned.  This is all likely secondary to pain.  Will treat his pain and then increase his CPAP to 4 hours t.i.d. as tolerated.  4/13- to OR today  Then  -continue PSV as tolerated   4/26 - remains on continuous PSV and now doing well  4/27- once trach is changed out we can start some trach collar  Patient had a spell of tachycardia put back on the ventilator restart CPAP trials  05/01/2022 increase weaning trials  05/02/2022 at T-tube trials to continuous CPAP  05/03/2022 tolerating trach collar trials reasonably well

## 2022-05-03 NOTE — SUBJECTIVE & OBJECTIVE
Interval History:  Patient awake    Objective:     Vital Signs (Most Recent):  Temp: 99.2 °F (37.3 °C) (05/03/22 0400)  Pulse: 110 (05/03/22 0600)  Resp: 15 (05/03/22 0600)  BP: (!) 99/54 (05/03/22 0600)  SpO2: 100 % (05/03/22 0600)   Vital Signs (24h Range):  Temp:  [97.9 °F (36.6 °C)-100.1 °F (37.8 °C)] 99.2 °F (37.3 °C)  Pulse:  [] 110  Resp:  [7-47] 15  SpO2:  [77 %-100 %] 100 %  BP: ()/(38-82) 99/54     Weight: 81 kg (178 lb 9.2 oz)  Body mass index is 24.91 kg/m².      Intake/Output Summary (Last 24 hours) at 5/3/2022 0622  Last data filed at 5/3/2022 0600  Gross per 24 hour   Intake 2530 ml   Output 2475 ml   Net 55 ml       Physical Exam    Vents:  Vent Mode: CPAP / PSV (05/03/22 0051)  Ventilator Initiated: No (04/23/22 1934)  Set Rate: 0 BPM (05/03/22 0051)  Vt Set: 0 mL (05/03/22 0051)  Pressure Support: 15 cmH20 (05/03/22 0051)  PEEP/CPAP: 5 cmH20 (05/03/22 0051)  Oxygen Concentration (%): 35 (05/03/22 0400)  Peak Airway Pressure: 21 cmH2O (05/03/22 0051)  Plateau Pressure: 20 cmH20 (03/07/22 0500)  Total Ve: 12.2 mL (05/03/22 0051)  F/VT Ratio<105 (RSBI): (!) 41.85 (05/03/22 0051)    Lines/Drains/Airways       Central Venous Catheter Line  Duration                  Hemodialysis Catheter 12/30/21 0900 right internal jugular 123 days              Drain  Duration                  Colostomy 12/18/21 1030 Descending/sigmoid  days         Gastrostomy/Enterostomy 03/09/22 1436 Percutaneous endoscopic gastrostomy (PEG) midline feeding 54 days              Airway  Duration                  Surgical Airway 04/27/22 0856 Marychuy;Other (Comment) Cuffed;Long 5 days              Peripheral Intravenous Line  Duration                  Peripheral IV - Single Lumen 04/11/22 1623 18 G Anterior;Proximal;Right Forearm 21 days                    Significant Labs:    CBC/Anemia Profile:  Recent Labs   Lab 05/02/22  0637   WBC 13.56*   HGB 6.3*   HCT 18.8*   *   MCV 93.5   RDW 15.7*         Chemistries:  Recent Labs   Lab 05/02/22  0637   *   K 3.4*   CL 96*   CO2 28   BUN 71*   CREATININE 2.32*   CALCIUM 8.1*       Recent Lab Results  (Last 5 results in the past 24 hours)        05/03/22  0558   05/03/22  0008   05/02/22  1742   05/02/22  1245   05/02/22  0637        Anion Gap         13       Aniso         1+       Bands         10       Baso #         0.05       Basophil %         0.4                1       BUN         71       BUN/CREAT RATIO         31       Calcium         8.1       Chloride         96       CO2         28       Creatinine         2.32       Differential Type         Manual       eGFR if non          30       Eos #         0.88       Eosinophil %         6.5                8       Glucose         98       Hematocrit         18.8       Hemoglobin         6.3       Immature Grans (Abs)         0.74       Immature Granulocytes         5.5       Lymph #         2.46       Lymph %         18.1                15       MCH         31.3       MCHC         33.5       MCV         93.5       Metamyelocytes         2       Mono #         1.49       Mono %         11.0                8       MPV         8.6       Myelocytes         1       Neutrophils, Abs         7.94       Neutrophils Relative         58.5       nRBC         0.0       NUCLEATED RBC ABSOLUTE         0.00       PLATELET MORPHOLOGY         Increased       Platelets         445       POC Glucose 112   144   137   114         Poly         Few       Potassium         3.4       RBC         2.01       RDW         15.7       Segmented Neutrophils, Man %         55       Sodium         134       Tobramycin, Random         0.9       Vancomycin, Random         10.3       WBC         13.56                              Significant Imaging:  I have reviewed all pertinent imaging results/findings within the past 24 hours.

## 2022-05-04 NOTE — ASSESSMENT & PLAN NOTE
Was doing trach collar over the weekend but had to be placed back on the vent earlier this week   - will treat his pain- hopefully this will improve his HR and then start back on weaning trails   4/7- aspirated yesterday - febrile this morning. Will start Clindamycin today -   4/8- clinically looks better- will resume PSV today as tolerated   4/9- increased respiratory rate and tachycardic on exam.  Patient had just been turned.  This is all likely secondary to pain.  Will treat his pain and then increase his CPAP to 4 hours t.i.d. as tolerated.  4/13- to OR today  Then  -continue PSV as tolerated   4/26 - remains on continuous PSV and now doing well  4/27- once trach is changed out we can start some trach collar  Patient had a spell of tachycardia put back on the ventilator restart CPAP trials  05/01/2022 increase weaning trials  05/02/2022 at T-tube trials to continuous CPAP  05/04/2022 increase trach collars to 3 hours t.i.d. and continue continue with CPAP

## 2022-05-04 NOTE — PROGRESS NOTES
[facility]  Pulmonology  Progress Note    Patient Name: Og Garcia  MRN: [id number]  Admission Date: 12/23/2021  Hospital Length of Stay: 132 days  Code Status: Prior  Attending Provider: Cecil Abernathy DO  Primary Care Provider: Hector Arndt DNP, FNP-C   Principal Problem: Denice gangrene    Subjective:     Interval History:  Patient awake but does not follow a lot of commands does not talk    Objective:     Vital Signs (Most Recent):  Temp: 98.9 °F (37.2 °C) (05/04/22 0400)  Pulse: 106 (05/04/22 0559)  Resp: (!) 28 (05/04/22 0400)  BP: (!) 100/50 (05/04/22 0559)  SpO2: 100 % (05/04/22 0559)   Vital Signs (24h Range):  Temp:  [97.7 °F (36.5 °C)-99.8 °F (37.7 °C)] 98.9 °F (37.2 °C)  Pulse:  [] 106  Resp:  [9-40] 28  SpO2:  [95 %-100 %] 100 %  BP: ()/(39-68) 100/50     Weight: 77.3 kg (170 lb 6.7 oz)  Body mass index is 23.77 kg/m².      Intake/Output Summary (Last 24 hours) at 5/4/2022 0648  Last data filed at 5/4/2022 0600  Gross per 24 hour   Intake 1906.67 ml   Output 500 ml   Net 1406.67 ml       Physical Exam  Vitals reviewed.   Constitutional:       Appearance: Normal appearance.      Interventions: He is not intubated.     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      Nose: Nose normal.      Mouth/Throat:      Mouth: Mucous membranes are dry.      Pharynx: Oropharynx is clear.   Eyes:      Extraocular Movements: Extraocular movements intact.      Conjunctiva/sclera: Conjunctivae normal.      Pupils: Pupils are equal, round, and reactive to light.   Cardiovascular:      Rate and Rhythm: Normal rate.      Heart sounds: Normal heart sounds. No murmur heard.  Pulmonary:      Effort: Pulmonary effort is normal. He is not intubated.      Breath sounds: Normal breath sounds.   Abdominal:      General: Abdomen is flat. Bowel sounds are normal.      Palpations: Abdomen is soft.   Musculoskeletal:         General:  Normal range of motion.      Cervical back: Normal range of motion and neck supple.      Right lower leg: No edema.      Left lower leg: No edema.   Skin:     General: Skin is warm and dry.      Capillary Refill: Capillary refill takes less than 2 seconds.   Neurological:      General: No focal deficit present.      Mental Status: He is alert and oriented to person, place, and time.   Psychiatric:         Mood and Affect: Mood normal.         Behavior: Behavior normal.       Vents:  Vent Mode: CPAP / PSV (05/04/22 0439)  Ventilator Initiated: No (04/23/22 1934)  Set Rate: 0 BPM (05/04/22 0439)  Vt Set: 5 mL (05/04/22 0439)  Pressure Support: 15 cmH20 (05/04/22 0439)  PEEP/CPAP: 5 cmH20 (05/04/22 0439)  Oxygen Concentration (%): 35 (05/04/22 0559)  Peak Airway Pressure: 20 cmH2O (05/04/22 0439)  Plateau Pressure: 20 cmH20 (03/07/22 0500)  Total Ve: 16.5 mL (05/04/22 0439)  F/VT Ratio<105 (RSBI): (!) 18.18 (05/03/22 1930)    Lines/Drains/Airways       Central Venous Catheter Line  Duration                  Hemodialysis Catheter 12/30/21 0900 right internal jugular 124 days              Drain  Duration                  Colostomy 12/18/21 1030 Descending/sigmoid  days         Gastrostomy/Enterostomy 03/09/22 1436 Percutaneous endoscopic gastrostomy (PEG) midline feeding 55 days              Airway  Duration                  Surgical Airway 04/27/22 0856 Shiley;Other (Comment) Cuffed;Long 6 days              Peripheral Intravenous Line  Duration                  Peripheral IV - Single Lumen 04/11/22 1623 18 G Anterior;Proximal;Right Forearm 22 days                    Significant Labs:    CBC/Anemia Profile:  Recent Labs   Lab 05/03/22  1905   HGB 7.6*   HCT 22.1*        Chemistries:  No results for input(s): NA, K, CL, CO2, BUN, CREATININE, CALCIUM, ALBUMIN, PROT, BILITOT, ALKPHOS, ALT, AST, GLUCOSE, MG, PHOS in the last 48 hours.    Recent Lab Results  (Last 5 results in the past 24 hours)        05/04/22  0605    05/04/22  0010   05/03/22  2133   05/03/22  1905   05/03/22  1749        Unit Blood Type Code               Unit Expiration               Antibody ID               CROSSMATCH INTERPRETATION               DISPENSE STATUS               Group & Rh               Hematocrit       22.1         Hemoglobin       7.6         INDIRECT BLANCHE               POC Glucose 119   132   127     140       Product Code               Unit ABO/Rh               UNIT NUMBER                                      Significant Imaging:  I have reviewed all pertinent imaging results/findings within the past 24 hours.    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 Will start Clindamycin today -   4/8- clinically looks better- will resume PSV today as tolerated   4/9- increased respiratory rate and tachycardic on exam.  Patient had just been turned.  This is all likely secondary to pain.  Will treat his pain and then increase his CPAP to 4 hours t.i.d. as tolerated.  4/13- to OR today  Then  -continue PSV as tolerated   4/26 - remains on continuous PSV and now doing well  4/27- once trach is changed out we can start some trach collar  Patient had a spell of tachycardia put back on the ventilator restart CPAP trials  05/01/2022 increase weaning trials  05/02/2022 at T-tube trials to continuous CPAP  05/04/2022 increase trach collars to 3 hours t.i.d. and continue continue with CPAP    Hypertension  No change here meds reviewed      ESRD (end stage renal disease)  Started on HD 12/30   Continue with HD per nephrology      Type 2 diabetes mellitus without complication, without long-term current use of insulin  Blood sugar tightly  controlled    Acute blood loss anemia  No active bleeding  Hgb 6.0 on 4/11  Patient transfused 1 unit with dialysis yesterday  4/13 -H/H 7.5/22.7  4/15- hgb is 6.2 today. We are going to give another unit of PRBC's today with dialysis  4/16 post transfusion cbc is pending  4/18- hgb is 6.6. 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

## 2022-05-04 NOTE — PLAN OF CARE
Problem: Adult Inpatient Plan of Care  Goal: Optimal Comfort and Wellbeing  Outcome: Ongoing, Progressing     Problem: Adjustment to Illness (Sepsis/Septic Shock)  Goal: Optimal Coping  Outcome: Ongoing, Progressing     Problem: Bleeding (Sepsis/Septic Shock)  Goal: Absence of Bleeding  Outcome: Ongoing, Progressing     Problem: Glycemic Control Impaired (Sepsis/Septic Shock)  Goal: Blood Glucose Level Within Desired Range  Outcome: Ongoing, Progressing     Problem: Nutrition Impaired (Sepsis/Septic Shock)  Goal: Optimal Nutrition Intake  Outcome: Ongoing, Progressing     Problem: Oral Intake Inadequate (Acute Kidney Injury/Impairment)  Goal: Optimal Nutrition Intake  Outcome: Ongoing, Progressing     Problem: Fall Injury Risk  Goal: Absence of Fall and Fall-Related Injury  Outcome: Ongoing, Progressing     Problem: Device-Related Complication Risk (Mechanical Ventilation, Invasive)  Goal: Optimal Device Function  Outcome: Ongoing, Progressing     Problem: Inability to Wean (Mechanical Ventilation, Invasive)  Goal: Mechanical Ventilation Liberation  Outcome: Ongoing, Progressing     Problem: Nutrition Impairment (Mechanical Ventilation, Invasive)  Goal: Optimal Nutrition Delivery  Outcome: Ongoing, Progressing     Problem: Skin and Tissue Injury (Mechanical Ventilation, Invasive)  Goal: Absence of Device-Related Skin and Tissue Injury  Outcome: Ongoing, Progressing     Problem: Device-Related Complication Risk (Artificial Airway)  Goal: Optimal Device Function  Outcome: Ongoing, Progressing     Problem: Skin and Tissue Injury (Artificial Airway)  Goal: Absence of Device-Related Skin or Tissue Injury  Outcome: Ongoing, Progressing     Problem: Device-Related Complication Risk (Hemodialysis)  Goal: Safe, Effective Therapy Delivery  Outcome: Ongoing, Progressing     Problem: Hemodynamic Instability (Hemodialysis)  Goal: Effective Tissue Perfusion  Outcome: Ongoing, Progressing     Problem: Diabetes Comorbidity  Goal:  Blood Glucose Level Within Targeted Range  Outcome: Ongoing, Progressing     Problem: Adult Inpatient Plan of Care  Goal: Plan of Care Review  Outcome: Ongoing, Not Progressing  Goal: Patient-Specific Goal (Individualized)  Outcome: Ongoing, Not Progressing  Goal: Absence of Hospital-Acquired Illness or Injury  Outcome: Ongoing, Not Progressing  Goal: Readiness for Transition of Care  Outcome: Ongoing, Not Progressing     Problem: Infection Progression (Sepsis/Septic Shock)  Goal: Absence of Infection Signs and Symptoms  Outcome: Ongoing, Not Progressing     Problem: Fluid and Electrolyte Imbalance (Acute Kidney Injury/Impairment)  Goal: Fluid and Electrolyte Balance  Outcome: Ongoing, Not Progressing     Problem: Renal Function Impairment (Acute Kidney Injury/Impairment)  Goal: Effective Renal Function  Outcome: Ongoing, Not Progressing     Problem: Infection  Goal: Absence of Infection Signs and Symptoms  Outcome: Ongoing, Not Progressing     Problem: Skin Injury Risk Increased  Goal: Skin Health and Integrity  Outcome: Ongoing, Not Progressing     Problem: Infection (Hemodialysis)  Goal: Absence of Infection Signs and Symptoms  Outcome: Ongoing, Not Progressing     Problem: Impaired Wound Healing  Goal: Optimal Wound Healing  Outcome: Ongoing, Not Progressing

## 2022-05-04 NOTE — ASSESSMENT & PLAN NOTE
No active bleeding  Hgb 6.0 on 4/11  Patient transfused 1 unit with dialysis yesterday  4/13 -H/H 7.5/22.7  4/15- hgb is 6.2 today. We are going to give another unit of PRBC's today with dialysis  4/16 post transfusion cbc is pending  4/18- hgb is 6.6. Will plan for transfusion with 1 unit of prbc's with next HD  4/21 - 7.2/21.6   4/25- 6.6/19.7 - will transfuse one unit PRBCs with next HD   4/28- Hgb now 8.0 following transfusion  5/2/2022 hemoglobin 7 no need for transfusion yet  5/3/2022 transfuse 1 unit packed red blood cells  05/04/2022 go transfusion yesterday hemoglobin 7.6 today, will check iron levels and anemia profile today he if they are abnormal supplemented and then consider Epogen

## 2022-05-04 NOTE — SUBJECTIVE & OBJECTIVE
Interval History:  Patient awake but does not follow a lot of commands does not talk    Objective:     Vital Signs (Most Recent):  Temp: 98.9 °F (37.2 °C) (05/04/22 0400)  Pulse: 106 (05/04/22 0559)  Resp: (!) 28 (05/04/22 0400)  BP: (!) 100/50 (05/04/22 0559)  SpO2: 100 % (05/04/22 0559)   Vital Signs (24h Range):  Temp:  [97.7 °F (36.5 °C)-99.8 °F (37.7 °C)] 98.9 °F (37.2 °C)  Pulse:  [] 106  Resp:  [9-40] 28  SpO2:  [95 %-100 %] 100 %  BP: ()/(39-68) 100/50     Weight: 77.3 kg (170 lb 6.7 oz)  Body mass index is 23.77 kg/m².      Intake/Output Summary (Last 24 hours) at 5/4/2022 0648  Last data filed at 5/4/2022 0600  Gross per 24 hour   Intake 1906.67 ml   Output 500 ml   Net 1406.67 ml       Physical Exam  Vitals reviewed.   Constitutional:       Appearance: Normal appearance.      Interventions: He is not intubated.     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      Nose: Nose normal.      Mouth/Throat:      Mouth: Mucous membranes are dry.      Pharynx: Oropharynx is clear.   Eyes:      Extraocular Movements: Extraocular movements intact.      Conjunctiva/sclera: Conjunctivae normal.      Pupils: Pupils are equal, round, and reactive to light.   Cardiovascular:      Rate and Rhythm: Normal rate.      Heart sounds: Normal heart sounds. No murmur heard.  Pulmonary:      Effort: Pulmonary effort is normal. He is not intubated.      Breath sounds: Normal breath sounds.   Abdominal:      General: Abdomen is flat. Bowel sounds are normal.      Palpations: Abdomen is soft.   Musculoskeletal:         General: Normal range of motion.      Cervical back: Normal range of motion and neck supple.      Right lower leg: No edema.      Left lower leg: No edema.   Skin:     General: Skin is warm and dry.      Capillary Refill: Capillary refill takes less than 2 seconds.   Neurological:      General: No focal deficit present.      Mental Status: He is alert and  oriented to person, place, and time.   Psychiatric:         Mood and Affect: Mood normal.         Behavior: Behavior normal.       Vents:  Vent Mode: CPAP / PSV (05/04/22 0439)  Ventilator Initiated: No (04/23/22 1934)  Set Rate: 0 BPM (05/04/22 0439)  Vt Set: 5 mL (05/04/22 0439)  Pressure Support: 15 cmH20 (05/04/22 0439)  PEEP/CPAP: 5 cmH20 (05/04/22 0439)  Oxygen Concentration (%): 35 (05/04/22 0559)  Peak Airway Pressure: 20 cmH2O (05/04/22 0439)  Plateau Pressure: 20 cmH20 (03/07/22 0500)  Total Ve: 16.5 mL (05/04/22 0439)  F/VT Ratio<105 (RSBI): (!) 18.18 (05/03/22 1930)    Lines/Drains/Airways       Central Venous Catheter Line  Duration                  Hemodialysis Catheter 12/30/21 0900 right internal jugular 124 days              Drain  Duration                  Colostomy 12/18/21 1030 Descending/sigmoid  days         Gastrostomy/Enterostomy 03/09/22 1436 Percutaneous endoscopic gastrostomy (PEG) midline feeding 55 days              Airway  Duration                  Surgical Airway 04/27/22 0856 Shiley;Other (Comment) Cuffed;Long 6 days              Peripheral Intravenous Line  Duration                  Peripheral IV - Single Lumen 04/11/22 1623 18 G Anterior;Proximal;Right Forearm 22 days                    Significant Labs:    CBC/Anemia Profile:  Recent Labs   Lab 05/03/22 1905   HGB 7.6*   HCT 22.1*        Chemistries:  No results for input(s): NA, K, CL, CO2, BUN, CREATININE, CALCIUM, ALBUMIN, PROT, BILITOT, ALKPHOS, ALT, AST, GLUCOSE, MG, PHOS in the last 48 hours.    Recent Lab Results  (Last 5 results in the past 24 hours)        05/04/22  0605   05/04/22  0010   05/03/22  2133   05/03/22  1905   05/03/22  1749        Unit Blood Type Code               Unit Expiration               Antibody ID               CROSSMATCH INTERPRETATION               DISPENSE STATUS               Group & Rh               Hematocrit       22.1         Hemoglobin       7.6         INDIRECT BLANCHE                POC Glucose 119   132   127     140       Product Code               Unit ABO/Rh               UNIT NUMBER                                      Significant Imaging:  I have reviewed all pertinent imaging results/findings within the past 24 hours.

## 2022-05-04 NOTE — PROGRESS NOTES
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 male with wounds to sacral, left lateral lower leg, right anterior foot, right hand, left posterior heel, and right lateral and medial foot. Wound on left heel has exposed bone, right lower leg has exposed tendon, and sacral has improved since debridement. Plan bedside debridement of lower extremities next week.     Review of Systems   Unable to perform ROS: Acuity of condition     Objective:     Vital Signs (Most Recent):  Temp: 98.1 °F (36.7 °C) (05/04/22 1500)  Pulse: 93 (05/04/22 1257)  Resp: 10 (05/04/22 1257)  BP: (!) 98/51 (05/04/22 1219)  SpO2: 100 % (05/04/22 1257) Vital Signs (24h Range):  Temp:  [97.8 °F (36.6 °C)-99.7 °F (37.6 °C)] 98.1 °F (36.7 °C)  Pulse:  [] 93  Resp:  [9-40] 10  SpO2:  [96 %-100 %] 100 %  BP: ()/(43-71) 98/51     Weight: 77.3 kg (170 lb 6.7 oz)  Body mass index is 23.77 kg/m².  No data recorded    Physical Exam  Vitals reviewed.   Constitutional:       Appearance: Normal appearance.   HENT:      Head: Normocephalic.   Cardiovascular:      Rate and Rhythm: Regular rhythm. Tachycardia present.   Pulmonary:      Effort: Pulmonary effort is normal.   Musculoskeletal:         General: Tenderness present.   Skin:     Findings: Erythema present.      Comments: Wound, see LDA for measurements and pictures   Neurological:      Mental Status: He is alert. Mental status is at baseline.      Motor: Weakness present.            Altered Skin Integrity 01/20/22 1000 Right anterior Foot Purple or maroon  localized area of discolored intact skin or non-intact skin or a blood-filled blister. (Active)   01/20/22 1000   Altered Skin Integrity Present on Admission: suspected hospital acquired   Side: Right   Orientation: anterior   Location: Foot   Wound Number:    Is this injury device related?:    Primary Wound Type:    Description of Altered Skin Integrity: Purple or maroon localized area of discolored intact skin or non-intact skin or a blood-filled blister.   Removal Indication and Assessment:    Wound Outcome:    (Retired) Wound Length (cm):    (Retired) Wound Width (cm):    (Retired) Depth (cm):    Wound Description (Comments):    Removal Indications:    Wound Image    05/03/22 1245   Description of Altered Skin Integrity Full thickness tissue loss. Base is covered by slough and/or eschar in the wound bed 05/03/22 1245   Dressing Appearance Dried drainage 05/03/22 1245   Drainage Amount None 05/03/22 1245   Drainage Characteristics/Odor Serous 04/30/22 0720   Appearance Dressing in place, unable to visualize 05/03/22 2000   Tissue loss description Not applicable 05/03/22 1245   Black (%), Wound Tissue Color 0 % 05/03/22 1245   Red (%), Wound Tissue Color 0 % 05/03/22 1245   Yellow (%), Wound Tissue Color 100 % 05/03/22 1245   Periwound Area Dry 05/03/22 1245   Wound Edges Defined;Open 05/03/22 1245   Wound Length (cm) 2 cm 05/03/22 1245   Wound Width (cm) 1 cm 05/03/22 1245   Wound Depth (cm) 0 cm 05/03/22 1245   Wound Volume (cm^3) 0 cm^3 05/03/22 1245   Wound Surface Area (cm^2) 2 cm^2 05/03/22 1245   Care Cleansed with:;Soap and water;Wound cleanser;Skin Barrier;Removed:;Applied: 05/03/22 1245   Dressing Removed;Applied;Changed;Gauze, wet to moist;Silver;Gauze;Other (comment);Rolled gauze 05/03/22 1245   Periwound Care Moisture barrier applied 05/03/22 1245   Dressing Change Due 05/04/22 05/03/22 1245            Altered Skin Integrity 01/20/22 1000 Right lateral Foot Purple or maroon localized area of  discolored intact skin or non-intact skin or a blood-filled blister. (Active)   01/20/22 1000   Altered Skin Integrity Present on Admission: suspected hospital acquired   Side: Right   Orientation: lateral   Location: Foot   Wound Number:    Is this injury device related?:    Primary Wound Type:    Description of Altered Skin Integrity: Purple or maroon localized area of discolored intact skin or non-intact skin or a blood-filled blister.   Removal Indication and Assessment:    Wound Outcome:    (Retired) Wound Length (cm):    (Retired) Wound Width (cm):    (Retired) Depth (cm):    Wound Description (Comments):    Removal Indications:    Wound Image    05/03/22 1245   Description of Altered Skin Integrity Full thickness tissue loss. Subcutaneous fat may be visible but bone, tendon or muscle are not exposed 05/03/22 1245   Dressing Appearance Moist drainage 05/03/22 1245   Drainage Amount Small 05/03/22 1245   Drainage Characteristics/Odor Serosanguineous;No odor 05/03/22 1245   Appearance Dressing in place, unable to visualize 05/03/22 2000   Tissue loss description Full thickness 05/03/22 1245   Black (%), Wound Tissue Color 0 % 05/03/22 1245   Red (%), Wound Tissue Color 50 % 05/03/22 1245   Yellow (%), Wound Tissue Color 50 % 05/03/22 1245   Periwound Area Moist 05/03/22 1245   Wound Edges Defined;Open 05/03/22 1245   Wound Length (cm) 3 cm 05/03/22 1245   Wound Width (cm) 5 cm 05/03/22 1245   Wound Depth (cm) 1 cm 05/03/22 1245   Wound Volume (cm^3) 15 cm^3 05/03/22 1245   Wound Surface Area (cm^2) 15 cm^2 05/03/22 1245   Care Cleansed with:;Soap and water;Wound cleanser;Applied:;Removed:;Skin Barrier;Moisturizing agent 05/03/22 1245   Dressing Removed;Applied;Changed;Gauze, wet to moist 05/03/22 1245   Periwound Care Moisture barrier applied 05/03/22 1245   Dressing Change Due 05/04/22 05/03/22 1245            Altered Skin Integrity 02/08/22 1300 Left posterior Heel Other (comment) Full thickness tissue loss.  Base is covered by slough and/or eschar in the wound bed (Active)   02/08/22 1300   Altered Skin Integrity Present on Admission: yes   Side: Left   Orientation: posterior   Location: Heel   Wound Number:    Is this injury device related?: No   Primary Wound Type: Other   Description of Altered Skin Integrity: Full thickness tissue loss. Base is covered by slough and/or eschar in the wound bed   Removal Indication and Assessment:    Wound Outcome:    (Retired) Wound Length (cm):    (Retired) Wound Width (cm):    (Retired) Depth (cm):    Wound Description (Comments):    Removal Indications:    Wound Image    05/03/22 1245   Description of Altered Skin Integrity Full thickness tissue loss. Base is covered by slough and/or eschar in the wound bed 05/03/22 1245   Dressing Appearance Moist drainage 05/03/22 1245   Drainage Amount Small 05/03/22 1245   Drainage Characteristics/Odor Serous;No odor;Tan 05/03/22 1245   Appearance Dressing in place, unable to visualize 05/03/22 2000   Tissue loss description Not applicable 05/03/22 1245   Black (%), Wound Tissue Color 0 % 05/03/22 1245   Red (%), Wound Tissue Color 10 % 05/03/22 1245   Yellow (%), Wound Tissue Color 90 % 05/03/22 1245   Periwound Area Moist 05/03/22 1245   Wound Edges Defined;Open 05/03/22 1245   Wound Length (cm) 7 cm 05/03/22 1245   Wound Width (cm) 7 cm 05/03/22 1245   Wound Depth (cm) 0.5 cm 05/03/22 1245   Wound Volume (cm^3) 24.5 cm^3 05/03/22 1245   Wound Surface Area (cm^2) 49 cm^2 05/03/22 1245   Care Cleansed with:;Soap and water;Wound cleanser;Applied:;Removed:;Skin Barrier;Moisturizing agent 05/03/22 1245   Dressing Removed;Applied;Changed;Gauze, wet to moist;Silver;Rolled gauze;Gauze;Other (comment) 05/03/22 1245   Periwound Care Moisture barrier applied 05/03/22 1245   Dressing Change Due 05/04/22 05/03/22 1245            Altered Skin Integrity 02/15/22 1300 Left lateral Leg Traumatic Partial thickness tissue loss. Shallow open ulcer with a red  or pink wound bed, without slough. Intact or Open/Ruptured Serum-filled blister. (Active)   02/15/22 1300   Altered Skin Integrity Present on Admission: suspected hospital acquired   Side: Left   Orientation: lateral   Location: Leg   Wound Number:    Is this injury device related?: No   Primary Wound Type: Traumatic   Description of Altered Skin Integrity: Partial thickness tissue loss. Shallow open ulcer with a red or pink wound bed, without slough. Intact or Open/Ruptured Serum-filled blister.   Removal Indication and Assessment:    Wound Outcome:    (Retired) Wound Length (cm):    (Retired) Wound Width (cm):    (Retired) Depth (cm):    Wound Description (Comments):    Removal Indications:    Wound Image    04/26/22 1235   Description of Altered Skin Integrity Full thickness tissue loss. Base is covered by slough and/or eschar in the wound bed 04/26/22 1235   Dressing Appearance Open to air 05/01/22 1800   Drainage Amount None 05/01/22 1800   Appearance Dressing in place, unable to visualize 05/03/22 2000   Tissue loss description Not applicable 05/02/22 1300   Black (%), Wound Tissue Color 10 % 04/26/22 1235   Red (%), Wound Tissue Color 0 % 04/26/22 1235   Yellow (%), Wound Tissue Color 0 % 04/26/22 1235   Periwound Area Dry 05/02/22 1300   Wound Edges Defined 05/02/22 1300   Care Cleansed with:;Soap and water;Wound cleanser;Applied:;Removed:;Skin Barrier 05/02/22 1300   Dressing Removed;Applied;Gauze, wet to moist;Silver;Rolled gauze 05/02/22 1300   Dressing Change Due 05/03/22 05/02/22 1300            Altered Skin Integrity 02/28/22 1300 Left anterior Foot Traumatic Partial thickness tissue loss. Shallow open ulcer with a red or pink wound bed, without slough. Intact or Open/Ruptured Serum-filled blister. (Active)   02/28/22 1300   Altered Skin Integrity Present on Admission: suspected hospital acquired   Side: Left   Orientation: anterior   Location: Foot   Wound Number:    Is this injury device related?: No    Primary Wound Type: Traumatic   Description of Altered Skin Integrity: Partial thickness tissue loss. Shallow open ulcer with a red or pink wound bed, without slough. Intact or Open/Ruptured Serum-filled blister.   Removal Indication and Assessment:    Wound Outcome:    (Retired) Wound Length (cm):    (Retired) Wound Width (cm):    (Retired) Depth (cm):    Wound Description (Comments):    Removal Indications:    Wound Image    05/03/22 1245   Description of Altered Skin Integrity Full thickness tissue loss. Base is covered by slough and/or eschar in the wound bed 04/27/22 1300   Dressing Appearance Open to air;Dry;Intact 05/03/22 1245   Drainage Amount None 05/03/22 1245   Drainage Characteristics/Odor No odor 05/03/22 1245   Appearance Dressing in place, unable to visualize 05/03/22 2000   Tissue loss description Not applicable 05/03/22 1245   Black (%), Wound Tissue Color 100 % 04/26/22 1235   Red (%), Wound Tissue Color 0 % 04/26/22 1235   Yellow (%), Wound Tissue Color 0 % 04/26/22 1235   Periwound Area Dry 05/03/22 1245   Wound Edges Defined 05/03/22 1245   Care Soap and water;Cleansed with: 05/03/22 1245   Dressing Removed;Applied;Changed 05/02/22 1300   Dressing Change Due 04/11/22 04/07/22 1130            Altered Skin Integrity 03/15/22 1000 Right lateral Malleolus Ulceration Purple or maroon localized area of discolored intact skin or non-intact skin or a blood-filled blister. (Active)   03/15/22 1000   Altered Skin Integrity Present on Admission: suspected hospital acquired   Side: Right   Orientation: lateral   Location: Malleolus   Wound Number:    Is this injury device related?: Yes   Primary Wound Type: Ulceration   Description of Altered Skin Integrity: Purple or maroon localized area of discolored intact skin or non-intact skin or a blood-filled blister.   Removal Indication and Assessment:    Wound Outcome:    (Retired) Wound Length (cm):    (Retired) Wound Width (cm):    (Retired) Depth (cm):     Wound Description (Comments):    Removal Indications:    Wound Image    05/03/22 1245   Description of Altered Skin Integrity Full thickness tissue loss. Subcutaneous fat may be visible but bone, tendon or muscle are not exposed 05/03/22 1245   Dressing Appearance Moist drainage 05/03/22 1245   Drainage Amount Small 05/03/22 1245   Drainage Characteristics/Odor Serosanguineous;No odor 05/03/22 1245   Appearance Dressing in place, unable to visualize 05/03/22 2000   Tissue loss description Full thickness 05/03/22 1245   Black (%), Wound Tissue Color 0 % 05/03/22 1245   Red (%), Wound Tissue Color 60 % 05/03/22 1245   Yellow (%), Wound Tissue Color 40 % 05/03/22 1245   Periwound Area Moist 05/03/22 1245   Wound Edges Defined;Open 05/03/22 1245   Wound Length (cm) 4.5 cm 05/03/22 1245   Wound Width (cm) 3 cm 05/03/22 1245   Wound Depth (cm) 0.5 cm 05/03/22 1245   Wound Volume (cm^3) 6.75 cm^3 05/03/22 1245   Wound Surface Area (cm^2) 13.5 cm^2 05/03/22 1245   Care Cleansed with:;Soap and water;Wound cleanser;Applied:;Removed:;Skin Barrier;Moisturizing agent 05/03/22 1245   Dressing Removed;Applied;Changed;Gauze, wet to moist;Silver;Gauze;Rolled gauze;Other (comment) 05/03/22 1245   Periwound Care Moisture barrier applied 05/03/22 1245   Dressing Change Due 05/04/22 05/03/22 1245            Altered Skin Integrity 03/15/22 1000 Right lateral Leg Ulceration Purple or maroon localized area of discolored intact skin or non-intact skin or a blood-filled blister. (Active)   03/15/22 1000   Altered Skin Integrity Present on Admission: suspected hospital acquired   Side: Right   Orientation: lateral   Location: Leg   Wound Number:    Is this injury device related?: No   Primary Wound Type: Ulceration   Description of Altered Skin Integrity: Purple or maroon localized area of discolored intact skin or non-intact skin or a blood-filled blister.   Removal Indication and Assessment:    Wound Outcome:    (Retired) Wound Length  (cm):    (Retired) Wound Width (cm):    (Retired) Depth (cm):    Wound Description (Comments):    Removal Indications:    Wound Image    05/03/22 1245   Description of Altered Skin Integrity Full thickness tissue loss with exposed bone, tendon, or muscle. Often includes undermining and tunneling. May extend into muscle and/or supporting structures. 05/03/22 1245   Dressing Appearance Moist drainage 05/03/22 1245   Drainage Amount Small 05/03/22 1245   Drainage Characteristics/Odor Serosanguineous;No odor 05/03/22 1245   Appearance Dressing in place, unable to visualize 05/03/22 2000   Tissue loss description Full thickness 05/03/22 1245   Black (%), Wound Tissue Color 0 % 05/03/22 1245   Red (%), Wound Tissue Color 60 % 05/03/22 1245   Yellow (%), Wound Tissue Color 40 % 05/03/22 1245   Periwound Area Moist 05/03/22 1245   Wound Edges Defined;Open 05/03/22 1245   Wound Length (cm) 8.5 cm 05/03/22 1245   Wound Width (cm) 4.5 cm 05/03/22 1245   Wound Depth (cm) 0.5 cm 05/03/22 1245   Wound Volume (cm^3) 19.125 cm^3 05/03/22 1245   Wound Surface Area (cm^2) 38.25 cm^2 05/03/22 1245   Care Cleansed with:;Soap and water;Wound cleanser;Applied:;Removed:;Skin Barrier;Moisturizing agent 05/03/22 1245   Dressing Removed;Applied;Reinforced;Gauze, wet to moist;Silver;Gauze;Rolled gauze;Other (comment) 05/03/22 1245   Periwound Care Moisture barrier applied 05/03/22 1245   Dressing Change Due 05/04/22 05/03/22 1245            Altered Skin Integrity 03/22/22 1300 posterior Sacral spine Other (comment) Full thickness tissue loss. Base is covered by slough and/or eschar in the wound bed (Active)   03/22/22 1300   Altered Skin Integrity Present on Admission: yes   Side:    Orientation: posterior   Location: Sacral spine   Wound Number:    Is this injury device related?: No   Primary Wound Type: Other   Description of Altered Skin Integrity: Full thickness tissue loss. Base is covered by slough and/or eschar in the wound bed    Removal Indication and Assessment:    Wound Outcome:    (Retired) Wound Length (cm):    (Retired) Wound Width (cm):    (Retired) Depth (cm):    Wound Description (Comments):    Removal Indications:    Wound Image    05/03/22 1245   Description of Altered Skin Integrity Full thickness tissue loss with exposed bone, tendon, or muscle. Often includes undermining and tunneling. May extend into muscle and/or supporting structures. 05/03/22 1245   Dressing Appearance Moist drainage 05/03/22 1245   Drainage Amount Moderate 05/03/22 2230   Drainage Characteristics/Odor Yellow;Tan 05/03/22 2230   Appearance Pink;Red;Tan 05/03/22 2230   Tissue loss description Full thickness 05/03/22 1245   Black (%), Wound Tissue Color 0 % 05/03/22 1245   Red (%), Wound Tissue Color 50 % 05/03/22 1245   Yellow (%), Wound Tissue Color 50 % 05/03/22 1245   Periwound Area Macerated;Moist;Hemosiderin Staining 05/03/22 1245   Wound Edges Defined;Open 05/03/22 1245   Wound Length (cm) 15 cm 05/03/22 1245   Wound Width (cm) 13 cm 05/03/22 1245   Wound Depth (cm) 4 cm 05/03/22 1245   Wound Volume (cm^3) 780 cm^3 05/03/22 1245   Wound Surface Area (cm^2) 195 cm^2 05/03/22 1245   Care Cleansed with:;Sterile normal saline 05/03/22 2230   Dressing Gauze, wet to dry;Silver 05/03/22 2230   Packing packed with 05/03/22 2230   Periwound Care Moisture barrier applied 05/03/22 2230   Dressing Change Due 05/04/22 05/03/22 2230            Altered Skin Integrity 04/05/22 1130 Right anterior Toe, second Traumatic (Active)   04/05/22 1130   Altered Skin Integrity Present on Admission: suspected hospital acquired   Side: Right   Orientation: anterior   Location: Toe, second   Wound Number:    Is this injury device related?: No   Primary Wound Type: Traumatic   Description of Altered Skin Integrity:    Removal Indication and Assessment:    Wound Outcome:    (Retired) Wound Length (cm):    (Retired) Wound Width (cm):    (Retired) Depth (cm):    Wound Description  (Comments):    Removal Indications:    Wound Image    05/03/22 1245   Dressing Appearance Open to air;Dry;Intact 05/03/22 1245   Drainage Amount None 05/03/22 1245   Drainage Characteristics/Odor Creamy 04/17/22 0800   Appearance Dry 05/03/22 2000   Tissue loss description Not applicable 05/03/22 1245   Wound Edges Defined 05/03/22 1245   Care Cleansed with:;Soap and water 05/03/22 1245   Dressing Changed 04/28/22 1400            Altered Skin Integrity 04/07/22 1130 posterior Buttocks Incontinence associated dermatitis (Active)   04/07/22 1130   Altered Skin Integrity Present on Admission: suspected hospital acquired   Side:    Orientation: posterior   Location: Buttocks   Wound Number:    Is this injury device related?: No   Primary Wound Type: Incontinence   Description of Altered Skin Integrity:    Removal Indication and Assessment:    Wound Outcome:    (Retired) Wound Length (cm):    (Retired) Wound Width (cm):    (Retired) Depth (cm):    Wound Description (Comments):    Removal Indications:    Wound Image    05/03/22 1245   Description of Altered Skin Integrity Partial thickness tissue loss. Shallow open ulcer with a red or pink wound bed, without slough. Intact or Open/Ruptured Serum-filled blister. 05/03/22 1245   Dressing Appearance Dry;Open to air;Intact;Clean 05/03/22 1245   Drainage Amount None 05/03/22 1245   Drainage Characteristics/Odor Serosanguineous 04/30/22 0720   Appearance Dressing in place, unable to visualize 05/03/22 2000   Tissue loss description Partial thickness 05/03/22 1245   Black (%), Wound Tissue Color 0 % 05/03/22 1245   Red (%), Wound Tissue Color 95 % 05/03/22 1245   Yellow (%), Wound Tissue Color 0 % 05/03/22 1245   Periwound Area Excoriated;Moist 05/03/22 1245   Wound Edges Undefined 05/03/22 1245   Care Cleansed with:;Soap and water;Wound cleanser;Applied:;Removed:;Skin Barrier;Moisturizing agent 05/03/22 1245   Dressing Removed;Applied;Changed;Gauze, wet to  moist;Silver;Gauze;Other (comment) 05/03/22 1245   Dressing Change Due 05/04/22 05/03/22 1245            Altered Skin Integrity 04/21/22 1200 Right medial Toe, fifth Ulceration (Active)   04/21/22 1200   Altered Skin Integrity Present on Admission: suspected hospital acquired   Side: Right   Orientation: medial   Location: Toe, fifth   Wound Number:    Is this injury device related?: No   Primary Wound Type: Ulceration   Description of Altered Skin Integrity:    Removal Indication and Assessment:    Wound Outcome:    (Retired) Wound Length (cm):    (Retired) Wound Width (cm):    (Retired) Depth (cm):    Wound Description (Comments):    Removal Indications:    Wound Image    04/26/22 1235   Description of Altered Skin Integrity Full thickness tissue loss. Subcutaneous fat may be visible but bone, tendon or muscle are not exposed 04/26/22 1235   Dressing Appearance Moist drainage 05/03/22 1245   Drainage Amount Scant 05/03/22 1245   Drainage Characteristics/Odor Serous;Tan;No odor 05/03/22 1245   Appearance Dressing in place, unable to visualize 05/03/22 2000   Tissue loss description Not applicable 05/03/22 1245   Black (%), Wound Tissue Color 0 % 04/26/22 1235   Red (%), Wound Tissue Color 0 % 04/26/22 1235   Yellow (%), Wound Tissue Color 100 % 04/26/22 1235   Periwound Area Moist 05/03/22 1245   Wound Edges Defined 05/03/22 1245   Wound Length (cm) 0.6 cm 04/26/22 1235   Wound Width (cm) 0.6 cm 04/26/22 1235   Wound Depth (cm) 0.1 cm 04/26/22 1235   Wound Volume (cm^3) 0.036 cm^3 04/26/22 1235   Wound Surface Area (cm^2) 0.36 cm^2 04/26/22 1235   Care Cleansed with:;Soap and water 05/03/22 1245   Dressing Removed;Applied;Changed;Gauze, wet to moist;Silver;Gauze 05/02/22 1300   Dressing Change Due 05/03/22 05/02/22 1300            Incision/Site 01/04/22 1444 Neck (Active)   01/04/22 1444   Present Prior to Hospital Arrival?: No   Side:    Location: Neck   Orientation:    Incision Type:    Closure Method:     Additional Comments:    Removal Indication and Assessment:    Wound Outcome:    Removal Indications:    Incision WDL WDL 05/01/22 1800   Dressing Appearance Open to air 05/01/22 1800   Drainage Amount None 05/01/22 1800       [REMOVED]      Negative Pressure Wound Therapy  12/14/21 midline;lower (Removed)   12/14/21    Side:    Orientation: midline;lower   Location: Lower quadrant   Additional Comments:    Removal Indication and Assessment: removed by previous caregiver   Location:    SDO Location:    Removed 02/16/22 0800   NPWT Type Vacuum Therapy 02/15/22 0600   Therapy Setting NPWT Vacuum off 02/15/22 1300   Pressure Setting NPWT 125 mmHg 02/15/22 0600   Therapy Interventions NPWT Seal intact 02/15/22 0600   Sponges Inserted NPWT Black 02/15/22 0600   Sponges Removed NPWT Black;White;1 02/10/22 1300   General Output (mL) 300 02/10/22 0930       [REMOVED]      Negative Pressure Wound Therapy  02/21/22 Right lower (Removed)   02/21/22    Side: Right   Orientation: lower   Location: Lower quadrant   Additional Comments:    Removal Indication and Assessment: No Longer Indicated   Location:    SDO Location:    Removed 02/22/22    NPWT Type Vacuum Therapy 02/22/22 0400   Therapy Setting NPWT Continuous therapy 02/22/22 0400   Pressure Setting NPWT other (see comments) 02/22/22 0400   Therapy Interventions NPWT Seal intact 02/22/22 0400   General Output (mL) 0 02/21/22 2340       [REMOVED]      Altered Skin Integrity Left lateral Thigh Purple or maroon localized area of discolored intact skin or non-intact skin or a blood-filled blister. (Removed)       Altered Skin Integrity Present on Admission:    Side: Left   Orientation: lateral   Location: Thigh   Wound Number:    Is this injury device related?:    Primary Wound Type:    Description of Altered Skin Integrity: Purple or maroon localized area of discolored intact skin or non-intact skin or a blood-filled blister.   Removal Indication and Assessment:    Wound  Outcome: Healed   (Retired) Wound Length (cm):    (Retired) Wound Width (cm):    (Retired) Depth (cm):    Wound Description (Comments):    Removal Indications:    Removed 03/15/22 1000   Wound Image    03/15/22 1000   Description of Altered Skin Integrity Purple or maroon localized area of discolored intact skin or non-intact skin or a blood-filled blister. 02/13/22 0730   Dressing Appearance Open to air;Dry;Intact 03/15/22 1000   Drainage Amount None 03/15/22 1000   Appearance Dry 03/15/22 1000   Care Cleansed with:;Soap and water 12/28/21 1521       [REMOVED]      Altered Skin Integrity 01/27/22 1045 Right dorsal Hand Other (comment) Full thickness tissue loss. Base is covered by slough and/or eschar in the wound bed (Removed)   01/27/22 1045   Altered Skin Integrity Present on Admission: suspected hospital acquired   Side: Right   Orientation: dorsal   Location: Hand   Wound Number:    Is this injury device related?: No   Primary Wound Type: Other   Description of Altered Skin Integrity: Full thickness tissue loss. Base is covered by slough and/or eschar in the wound bed   Removal Indication and Assessment:    Wound Outcome:    (Retired) Wound Length (cm):    (Retired) Wound Width (cm):    (Retired) Depth (cm):    Wound Description (Comments):    Removal Indications:    Removed 04/05/22 1130   Wound Image    04/05/22 1130   Description of Altered Skin Integrity Partial thickness tissue loss. Shallow open ulcer with a red or pink wound bed, without slough. Intact or Open/Ruptured Serum-filled blister. 02/28/22 1300   Dressing Appearance Open to air;Dry;Intact 03/21/22 1300   Drainage Amount None 03/21/22 1300   Drainage Characteristics/Odor Serosanguineous;No odor 03/01/22 1330   Appearance Pink;Dry 04/05/22 1130   Tissue loss description Not applicable 04/05/22 1130   Black (%), Wound Tissue Color 99 % 02/07/22 1300   Red (%), Wound Tissue Color 95 % 02/24/22 1330   Yellow (%), Wound Tissue Color 5 % 02/24/22  1330   Periwound Area Dry;Intact 03/21/22 1300   Wound Edges Defined 03/21/22 1300   Wound Length (cm) 3.5 cm 02/07/22 1300   Wound Width (cm) 2 cm 02/07/22 1300   Wound Depth (cm) 0 cm 02/07/22 1300   Wound Volume (cm^3) 0 cm^3 02/07/22 1300   Wound Surface Area (cm^2) 7 cm^2 02/07/22 1300   Care Cleansed with:;Soap and water 03/21/22 1300   Dressing Applied;Other (comment) 03/02/22 1300   Dressing Change Due 03/02/22 03/01/22 1330       [REMOVED]      Altered Skin Integrity 02/08/22 1300 Right lateral Malleolus Traumatic Partial thickness tissue loss. Shallow open ulcer with a red or pink wound bed, without slough. Intact or Open/Ruptured Serum-filled blister. (Removed)   02/08/22 1300   Altered Skin Integrity Present on Admission: suspected hospital acquired   Side: Right   Orientation: lateral   Location: Malleolus   Wound Number:    Is this injury device related?: No   Primary Wound Type: Traumatic   Description of Altered Skin Integrity: Partial thickness tissue loss. Shallow open ulcer with a red or pink wound bed, without slough. Intact or Open/Ruptured Serum-filled blister.   Removal Indication and Assessment: site change   Wound Outcome:    (Retired) Wound Length (cm):    (Retired) Wound Width (cm):    (Retired) Depth (cm):    Wound Description (Comments):    Removal Indications:    Removed 03/15/22 1000   Wound Image    03/07/22 1300   Description of Altered Skin Integrity Partial thickness tissue loss. Shallow open ulcer with a red or pink wound bed, without slough. Intact or Open/Ruptured Serum-filled blister. 03/07/22 1300   Dressing Appearance Dry 03/14/22 0945   Drainage Amount None 03/12/22 1621   Drainage Characteristics/Odor Brown;No odor 03/09/22 1250   Appearance Dressing in place, unable to visualize;Intact 03/13/22 2000   Tissue loss description Not applicable 03/09/22 1250   Periwound Area Dry 03/14/22 0945   Wound Edges Defined 03/14/22 0945   Care Cleansed with:;Soap and water 03/16/22 1100    Dressing Applied;Silver 03/16/22 1100   Dressing Change Due 03/11/22 03/10/22 1100       [REMOVED]      Altered Skin Integrity 02/08/22 1300 Right posterior Achilles Traumatic Purple or maroon localized area of discolored intact skin or non-intact skin or a blood-filled blister. (Removed)   02/08/22 1300   Altered Skin Integrity Present on Admission: suspected hospital acquired   Side: Right   Orientation: posterior   Location: Achilles   Wound Number:    Is this injury device related?: No   Primary Wound Type: Traumatic   Description of Altered Skin Integrity: Purple or maroon localized area of discolored intact skin or non-intact skin or a blood-filled blister.   Removal Indication and Assessment:    Wound Outcome: Healed   (Retired) Wound Length (cm):    (Retired) Wound Width (cm):    (Retired) Depth (cm):    Wound Description (Comments):    Removal Indications:    Removed 03/15/22 1000   Wound Image    03/15/22 1000   Description of Altered Skin Integrity Purple or maroon localized area of discolored intact skin or non-intact skin or a blood-filled blister. 03/07/22 1300   Dressing Appearance Dry;Open to air;Intact 03/15/22 1000   Drainage Amount None 03/15/22 1000   Appearance Dressing in place, unable to visualize;Intact 03/13/22 2000   Tissue loss description Not applicable 03/15/22 1000   Wound Edges Defined 03/15/22 1000   Care Cleansed with:;Soap and water 03/15/22 1000   Dressing Changed 02/21/22 2340       [REMOVED]      Altered Skin Integrity 02/15/22 1300 Right anterior Foot Traumatic Partial thickness tissue loss. Shallow open ulcer with a red or pink wound bed, without slough. Intact or Open/Ruptured Serum-filled blister. (Removed)   02/15/22 1300   Altered Skin Integrity Present on Admission: suspected hospital acquired   Side: Right   Orientation: anterior   Location: Foot   Wound Number:    Is this injury device related?: No   Primary Wound Type: Traumatic   Description of Altered Skin  Integrity: Partial thickness tissue loss. Shallow open ulcer with a red or pink wound bed, without slough. Intact or Open/Ruptured Serum-filled blister.   Removal Indication and Assessment:    Wound Outcome: Healed   (Retired) Wound Length (cm):    (Retired) Wound Width (cm):    (Retired) Depth (cm):    Wound Description (Comments):    Removal Indications:    Removed 05/03/22 1300   Wound Image    04/26/22 1235   Description of Altered Skin Integrity Purple or maroon localized area of discolored intact skin or non-intact skin or a blood-filled blister. 03/15/22 1000   Dressing Appearance Dry 05/03/22 1245   Drainage Amount None 05/03/22 1245   Drainage Characteristics/Odor Clear 05/01/22 0720   Appearance Dressing in place, unable to visualize 05/03/22 2000   Tissue loss description Not applicable 05/03/22 1245   Black (%), Wound Tissue Color 99 % 03/15/22 1000   Red (%), Wound Tissue Color 99 % 02/15/22 1300   Periwound Area Dry 05/03/22 1245   Wound Edges Defined 05/03/22 1245   Care Cleansed with:;Soap and water 05/03/22 1245   Dressing Changed 05/01/22 1800   Dressing Change Due 04/26/22 04/25/22 1700       [REMOVED]      Altered Skin Integrity 03/07/22 1300 Left anterior Greater trochanter Moisture associated dermatitis (Removed)   03/07/22 1300   Altered Skin Integrity Present on Admission:    Side: Left   Orientation: anterior   Location: Greater trochanter   Wound Number:    Is this injury device related?: No   Primary Wound Type: Moisture ass   Description of Altered Skin Integrity:    Removal Indication and Assessment:    Wound Outcome: Healed   (Retired) Wound Length (cm):    (Retired) Wound Width (cm):    (Retired) Depth (cm):    Wound Description (Comments):    Removal Indications:    Removed 05/03/22 1300   Wound Image    04/21/22 1200   Dressing Appearance Open to air;Dry;Intact 05/03/22 1245   Drainage Amount None 05/03/22 1245   Appearance Dressing in place, unable to visualize 05/03/22 2000    Tissue loss description Not applicable 04/26/22 1235   Periwound Area Dry 05/03/22 1245   Wound Edges Defined 05/03/22 1245   Care Cleansed with:;Soap and water 05/03/22 1245       [REMOVED]      Altered Skin Integrity 03/07/22 1300 Right anterior Toe, fourth Ulceration Partial thickness tissue loss. Shallow open ulcer with a red or pink wound bed, without slough. Intact or Open/Ruptured Serum-filled blister. (Removed)   03/07/22 1300   Altered Skin Integrity Present on Admission: yes   Side: Right   Orientation: anterior   Location: Toe, fourth   Wound Number:    Is this injury device related?: No   Primary Wound Type: Ulceration   Description of Altered Skin Integrity: Partial thickness tissue loss. Shallow open ulcer with a red or pink wound bed, without slough. Intact or Open/Ruptured Serum-filled blister.   Removal Indication and Assessment:    Wound Outcome:    (Retired) Wound Length (cm):    (Retired) Wound Width (cm):    (Retired) Depth (cm):    Wound Description (Comments):    Removal Indications:    Removed 04/13/22 1030   Wound Image    04/05/22 1130   Description of Altered Skin Integrity Full thickness tissue loss. Base is covered by slough and/or eschar in the wound bed 04/05/22 1130   Dressing Appearance Dry;Intact 04/12/22 0715   Drainage Amount None 04/12/22 0715   Drainage Characteristics/Odor No odor 04/11/22 1000   Appearance Black;Dry 04/11/22 1000   Tissue loss description Not applicable 04/11/22 1000   Black (%), Wound Tissue Color 100 % 04/05/22 1130   Red (%), Wound Tissue Color 0 % 04/05/22 1130   Yellow (%), Wound Tissue Color 0 % 04/05/22 1130   Periwound Area Dry 04/11/22 1000   Wound Edges Defined 04/11/22 1000   Care Cleansed with:;Soap and water 04/11/22 1000   Dressing Removed;Applied;Changed;Hydrocolloid 04/07/22 1145   Dressing Change Due 04/11/22 04/07/22 1145       [REMOVED]      Altered Skin Integrity 04/19/22 1025 Left lateral Lower quadrant Skin Tear (Removed)   04/19/22  1025   Altered Skin Integrity Present on Admission: suspected hospital acquired   Side: Left   Orientation: lateral   Location: Lower quadrant   Wound Number:    Is this injury device related?: No   Primary Wound Type: Skin tear   Description of Altered Skin Integrity:    Removal Indication and Assessment:    Wound Outcome: Healed   (Retired) Wound Length (cm):    (Retired) Wound Width (cm):    (Retired) Depth (cm):    Wound Description (Comments):    Removal Indications:    Removed 05/03/22 1300   Wound Image    05/03/22 1245   Description of Altered Skin Integrity Partial thickness tissue loss. Shallow open ulcer with a red or pink wound bed, without slough. Intact or Open/Ruptured Serum-filled blister. 04/26/22 1235   Dressing Appearance Dry;Open to air;Intact 05/03/22 1245   Drainage Amount None 05/03/22 1245   Drainage Characteristics/Odor Serosanguineous;No odor 04/26/22 1235   Appearance Dry 05/03/22 2000   Tissue loss description Partial thickness 05/03/22 1245   Black (%), Wound Tissue Color 0 % 05/03/22 1245   Red (%), Wound Tissue Color 100 % 05/03/22 1245   Yellow (%), Wound Tissue Color 0 % 05/03/22 1245   Periwound Area Dry 05/03/22 1245   Wound Edges Defined 05/03/22 1245   Wound Length (cm) 2 cm 04/19/22 1025   Wound Width (cm) 1 cm 04/19/22 1025   Wound Depth (cm) 0 cm 04/19/22 1025   Wound Volume (cm^3) 0 cm^3 04/19/22 1025   Wound Surface Area (cm^2) 2 cm^2 04/19/22 1025   Care Cleansed with:;Soap and water;Moisturizing agent 05/03/22 1245   Dressing Foam 05/02/22 1300       [REMOVED]      Wound 12/27/21 1100 Traumatic Left anterior Knee (Removed)   12/27/21 1100    Pre-existing: Yes   Primary Wound Type: Traumatic   Side: Left   Orientation: anterior   Location: Knee   Wound Number:    Ankle-Brachial Index:    Pulses:    Removal Indication and Assessment:    Wound Outcome: Healed   (Retired) Wound Type:    (Retired) Wound Length (cm):    (Retired) Wound Width (cm):    (Retired) Depth (cm):     Wound Description (Comments):    Removal Indications:    Removed 02/28/22 1300   Wound Image    02/24/22 1330   Wound WDL WDL 02/28/22 1300   Dressing Appearance Open to air;Dry;Intact 02/28/22 1300   Drainage Amount None 02/28/22 1300   Appearance Intact 02/28/22 1300   Care Cleansed with:;Soap and water 02/07/22 1300       [REMOVED]      Incision/Site 12/13/21 2317 Perineum (Removed)   12/13/21 2317   Present Prior to Hospital Arrival?:    Side:    Location: Perineum   Orientation:    Incision Type:    Closure Method: Other (see comments)   Additional Comments:    Removal Indication and Assessment: site change   Wound Outcome:    Removal Indications:    Removed 03/22/22 1300   Wound Image    03/15/22 1000   Incision WDL WDL 03/21/22 1300   Dressing Appearance Moist drainage 03/21/22 1300   Drainage Amount Small 03/21/22 1300   Drainage Characteristics/Odor Serosanguineous;No odor 03/21/22 1300   Appearance Dressing in place, unable to visualize;Intact 03/23/22 0000   Black (%), Wound Tissue Color 0 % 03/15/22 1000   Red (%), Wound Tissue Color 99 % 03/15/22 1000   Yellow (%), Wound Tissue Color 0 % 03/15/22 1000   Periwound Area Moist 03/21/22 1300   Wound Edges Defined;Open 03/21/22 1300   Wound Length (cm) 7.5 cm 03/15/22 1000   Wound Width (cm) 11 cm 03/15/22 1000   Wound Depth (cm) 2 cm 03/15/22 1000   Wound Volume (cm^3) 165 cm^3 03/15/22 1000   Wound Surface Area (cm^2) 82.5 cm^2 03/15/22 1000   Care Cleansed with:;Soap and water;Wound cleanser;Applied:;Removed:;Skin Barrier;Moisturizing agent 03/21/22 1300   Dressing Removed;Applied;Changed;Gauze, wet to moist;Gauze;Other (comment) 03/21/22 1300   Packing packing removed 03/20/22 1550   Packing Inserted  1 01/23/22 1800   Packing Removed 1 02/05/22 0730   Periwound Care Moisture barrier applied;Skin barrier film applied 03/21/22 1300   Dressing Change Due 03/22/22 03/21/22 1300       [REMOVED]      Incision/Site 12/27/21 1100 Right Buttocks (Removed)    12/27/21 1100   Present Prior to Hospital Arrival?:    Side: Right   Location: Buttocks   Orientation:    Incision Type:    Closure Method:    Additional Comments:    Removal Indication and Assessment:    Wound Outcome:    Removal Indications:    Removed 12/27/21 1100   Incision WDL WDL 12/26/21 0730   Dressing Appearance Dry;Intact;Dried drainage 12/26/21 0730   Drainage Amount Scant 12/26/21 0730   Drainage Characteristics/Odor Brown 12/26/21 0730   Appearance Dressing in place, unable to visualize 12/27/21 2000   Care Cleansed with:;Antimicrobial agent;Soap and water;Applied:;Other (see comments) 12/26/21 0730   Dressing Removed;Changed;Gauze, wet to dry 12/26/21 0730   Dressing Change Due 12/27/21 12/26/21 1530       [REMOVED]      Incision/Site 12/14/21 1238 Abdomen (Removed)   12/14/21 1238   Present Prior to Hospital Arrival?:    Side:    Location: Abdomen   Orientation:    Incision Type:    Closure Method:    Additional Comments:    Removal Indication and Assessment: site change   Wound Outcome:    Removal Indications:    Removed 02/07/22 1330   Wound Image    12/27/21 1100   Incision WDL L 01/27/22 1045   Dressing Appearance Dry;Intact 01/22/22 0010   Drainage Amount Moderate 01/18/22 0730   Drainage Characteristics/Odor Brown 01/09/22 0715   Appearance Chung;Moist 12/29/21 1100   Periwound Area Moist 12/29/21 1100   Wound Edges Defined;Open 12/29/21 1100   Care Cleansed with:;Sterile normal saline;Applied:;Removed:;Skin Barrier 12/28/21 1330   Dressing Removed;Changed;Gauze, wet to dry 01/01/22 0550   Packing packing removed;packed with;gauze, iodoform 01/01/22 0550   Dressing Change Due 01/02/22 01/01/22 0550       [REMOVED]      Incision/Site 01/01/22 0936 Abdomen (Removed)   01/01/22 0936   Present Prior to Hospital Arrival?:    Side:    Location: Abdomen   Orientation:    Incision Type:    Closure Method:    Additional Comments:    Removal Indication and Assessment: site change   Wound Outcome:     Removal Indications:    Removed 02/07/22 1330   Incision WDL ex 01/23/22 0730   Dressing Appearance Intact;Clean 01/23/22 2010   Drainage Amount Moderate 01/23/22 0730   Drainage Characteristics/Odor Sanguineous 01/23/22 0730   Appearance Dressing in place, unable to visualize 01/23/22 0730   Dressing Foam 01/19/22 0730       [REMOVED]      Incision/Site 01/01/22 0956 Right Groin (Removed)   01/01/22 0956   Present Prior to Hospital Arrival?:    Side: Right   Location: Groin   Orientation:    Incision Type:    Closure Method:    Additional Comments:    Removal Indication and Assessment: site change   Wound Outcome:    Removal Indications:    Removed 02/07/22 1300   Incision WDL WDL 01/22/22 0745   Dressing Appearance Open to air 01/19/22 0730   Drainage Amount None 01/19/22 0730   Care Cleansed with:;Soap and water 01/19/22 0730   Dressing Applied 01/14/22 0800       [REMOVED]      Incision/Site 01/04/22 1444 Abdomen (Removed)   01/04/22 1444   Present Prior to Hospital Arrival?:    Side:    Location: Abdomen   Orientation:    Incision Type:    Closure Method:    Additional Comments:    Removal Indication and Assessment: site change   Wound Outcome:    Removal Indications:    Removed 02/07/22 1300   Incision WDL ex 01/19/22 0730   Dressing Appearance Dry;Intact 01/21/22 0730   Drainage Amount Moderate 01/21/22 0730   Drainage Characteristics/Odor Brown;Serosanguineous 01/21/22 0730   Appearance Intact 01/19/22 0730   Dressing Foam 01/19/22 0730       [REMOVED]      Incision/Site 01/04/22 1444 Buttocks (Removed)   01/04/22 1444   Present Prior to Hospital Arrival?:    Side:    Location: Buttocks   Orientation:    Incision Type:    Closure Method:    Additional Comments:    Removal Indication and Assessment: site change   Wound Outcome:    Removal Indications:    Removed 03/22/22 1300   Wound Image    03/15/22 1000   Incision WDL WDL 03/21/22 1300   Dressing Appearance Moist drainage 03/21/22 1300   Drainage Amount  Small 03/21/22 1300   Drainage Characteristics/Odor Serosanguineous;Other (see comments) 03/21/22 1300   Appearance Dressing in place, unable to visualize;Intact 03/23/22 0000   Black (%), Wound Tissue Color 80 % 03/15/22 1000   Red (%), Wound Tissue Color 20 % 03/15/22 1000   Yellow (%), Wound Tissue Color 0 % 03/15/22 1000   Periwound Area Moist 03/21/22 1300   Wound Edges Defined;Open 03/21/22 1300   Wound Length (cm) 10 cm 03/15/22 1000   Wound Width (cm) 6 cm 03/15/22 1000   Wound Depth (cm) 0 cm 03/15/22 1000   Wound Volume (cm^3) 0 cm^3 03/15/22 1000   Wound Surface Area (cm^2) 60 cm^2 03/15/22 1000   Care Cleansed with:;Soap and water;Wound cleanser;Applied:;Removed:;Skin Barrier;Moisturizing agent 03/21/22 1300   Dressing Removed;Applied;Changed;Gauze, wet to moist;Gauze;Other (comment) 03/21/22 1300   Packing packing removed 03/13/22 1300   Periwound Care Moisture barrier applied;Skin barrier film applied 03/21/22 1300   Dressing Change Due 03/22/22 03/21/22 1300       [REMOVED]      Incision/Site 01/07/22 1106 Abdomen (Removed)   01/07/22 1106   Present Prior to Hospital Arrival?:    Side:    Location: Abdomen   Orientation:    Incision Type:    Closure Method:    Additional Comments:    Removal Indication and Assessment: site change   Wound Outcome:    Removal Indications:    Removed 02/07/22 1300   Dressing Appearance Dry;Intact 02/05/22 0730   Drainage Amount None 02/05/22 0730   Drainage Characteristics/Odor Brown;Sanguineous 01/19/22 0730   Dressing Other (comment) 01/10/22 0720       [REMOVED]      Incision/Site 01/10/22 1518 Abdomen (Removed)   01/10/22 1518   Present Prior to Hospital Arrival?:    Side:    Location: Abdomen   Orientation:    Incision Type:    Closure Method:    Additional Comments:    Removal Indication and Assessment:    Wound Outcome: Healed   Removal Indications:    Removed 03/15/22 1000   Wound Image     03/15/22 1000   Incision WDL WDL 03/15/22 1000   Dressing Appearance  Moist drainage 03/15/22 1000   Drainage Amount Scant 03/15/22 1000   Drainage Characteristics/Odor Serosanguineous;No odor 03/15/22 1000   Appearance Pink;Hypergranulation;Moist;Dry 03/15/22 1000   Red (%), Wound Tissue Color 30 % 03/15/22 1000   Periwound Area Dry;Moist 03/15/22 1000   Wound Edges Defined 03/15/22 1000   Care Cleansed with:;Soap and water;Applied:;Removed:;Skin Barrier;Moisturizing agent 03/15/22 1000   Dressing Removed;Applied;Changed;Gauze;Non-adherent;Other (comment) 03/15/22 1000   Dressing Change Due 03/18/22 03/15/22 1000       [REMOVED]      Incision/Site 01/12/22 1001 Abdomen (Removed)   01/12/22 1001   Present Prior to Hospital Arrival?:    Side:    Location: Abdomen   Orientation:    Incision Type:    Closure Method:    Additional Comments:    Removal Indication and Assessment: site change   Wound Outcome:    Removal Indications:    Removed 02/07/22 1300   Dressing Appearance Intact;Dry;Clean 02/09/22 0730   Drainage Amount None 02/09/22 0730   Care Cleansed with:;Soap and water 01/14/22 1000   Dressing Applied;Other (comment) 01/14/22 1000       [REMOVED]      Incision/Site 02/21/22 1318 Right Leg (Removed)   02/21/22 1318   Present Prior to Hospital Arrival?:    Side: Right   Location: Leg   Orientation:    Incision Type:    Closure Method:    Additional Comments:    Removal Indication and Assessment: site change   Wound Outcome:    Removal Indications:    Removed 04/14/22 1145   Wound Image    04/05/22 1130   Incision WDL WDL 04/12/22 0715   Dressing Appearance Open to air 04/12/22 0715   Drainage Amount None 04/12/22 0715   Drainage Characteristics/Odor Serosanguineous;No odor 03/22/22 1300   Appearance Pink;Dry 04/11/22 1000   Black (%), Wound Tissue Color 0 % 04/05/22 1130   Red (%), Wound Tissue Color 100 % 04/05/22 1130   Yellow (%), Wound Tissue Color 0 % 04/05/22 1130   Periwound Area Dry 04/11/22 1000   Wound Edges Defined 04/11/22 1000   Care Soap and water;Cleansed with:  04/12/22 0715   Dressing Gauze;Other (comment) 03/08/22 1300   Dressing Change Due 02/28/22 02/24/22 1540       [REMOVED]      Incision/Site 02/21/22 1318 Abdomen (Removed)   02/21/22 1318   Present Prior to Hospital Arrival?:    Side:    Location: Abdomen   Orientation:    Incision Type:    Closure Method:    Additional Comments:    Removal Indication and Assessment: site change   Wound Outcome:    Removal Indications:    Removed 02/23/22 1330   Dressing Appearance Dry;Intact 02/23/22 0000   Appearance Dressing in place, unable to visualize 02/23/22 0000       [REMOVED]      Incision/Site 03/09/22 1444 Abdomen (Removed)   03/09/22 1444   Present Prior to Hospital Arrival?:    Side:    Location: Abdomen   Orientation:    Incision Type:    Closure Method:    Additional Comments:    Removal Indication and Assessment:    Wound Outcome: Healed   Removal Indications:    Removed 04/14/22 1145   Wound Image    04/07/22 1415   Incision WDL WDL 04/12/22 0715   Dressing Appearance Open to air 04/12/22 0715   Drainage Amount None 04/12/22 0715   Drainage Characteristics/Odor Serosanguineous;No odor 03/22/22 1300   Appearance Dry;Intact 04/11/22 1000   Black (%), Wound Tissue Color 0 % 04/11/22 1000   Red (%), Wound Tissue Color 100 % 04/11/22 1000   Yellow (%), Wound Tissue Color 0 % 04/11/22 1000   Periwound Area Dry 04/11/22 1000   Wound Edges Defined 04/11/22 1000   Care Cleansed with:;Soap and water 04/12/22 0715   Dressing Removed;Applied;Changed;Non-adherent;Other (comment) 03/22/22 1300       [REMOVED]      Incision/Site 03/25/22 1008 Buttocks (Removed)   03/25/22 1008   Present Prior to Hospital Arrival?:    Side:    Location: Buttocks   Orientation:    Incision Type:    Closure Method:    Additional Comments:    Removal Indication and Assessment: site change   Wound Outcome:    Removal Indications:    Removed 04/14/22 1145   Incision WDL ex 04/13/22 0730   Dressing Appearance Dry;Intact 04/14/22 0738   Drainage Amount  None 04/13/22 0730   Appearance Dressing in place, unable to visualize 04/14/22 2000   Care Cleansed with:;Antimicrobial agent;Wound cleanser 04/10/22 1600   Dressing Changed;Gauze, wet to dry 04/10/22 1600   Packing packed with;strip gauze 04/10/22 1600   Dressing Change Due 04/11/22 04/10/22 1600       [REMOVED]      Incision/Site 04/13/22 1052 Left Leg (Removed)   04/13/22 1052   Present Prior to Hospital Arrival?:    Side: Left   Location: Leg   Orientation:    Incision Type:    Closure Method:    Additional Comments:    Removal Indication and Assessment:    Wound Outcome:    Removal Indications:    Removed 04/14/22 1145   Appearance Dressing in place, unable to visualize 04/14/22 2000       [REMOVED]      Incision/Site 04/13/22 1052 Right Leg (Removed)   04/13/22 1052   Present Prior to Hospital Arrival?:    Side: Right   Location: Leg   Orientation:    Incision Type:    Closure Method:    Additional Comments:    Removal Indication and Assessment: site change   Wound Outcome:    Removal Indications:    Removed 04/14/22 1145   Appearance Dressing in place, unable to visualize 04/14/22 2000       [REMOVED]      Incision/Site 04/13/22 1052 Sacral Spine (Removed)   04/13/22 1052   Present Prior to Hospital Arrival?:    Side:    Location: Sacral Spine   Orientation:    Incision Type:    Closure Method:    Additional Comments:    Removal Indication and Assessment: site change   Wound Outcome:    Removal Indications:    Removed 04/14/22 1145   Dressing Appearance Dry;Clean;Intact 04/14/22 0738   Appearance Dressing in place, unable to visualize 04/14/22 2000       Assessment and Plan      Wound Assessment:  1. Sacral wound   2.  Lower extremity wounds     Wound Care Plan:   1. Clean with vashe.   2. Apply silvadene to all wounds  3. Cover and secure dry dressing.   4. Change daily and PRN for soilage.   5. Turn every 2 hours  6. Low air loss mattress  7. TAP system  8. Keep pressure off wounds        Signature:   HERSON Viveros  Wound Care    Date of encounter: 05/03/2022

## 2022-05-05 NOTE — PLAN OF CARE
Problem: Adult Inpatient Plan of Care  Goal: Plan of Care Review  Outcome: Ongoing, Progressing  Goal: Optimal Comfort and Wellbeing  Outcome: Ongoing, Progressing     Problem: Adjustment to Illness (Sepsis/Septic Shock)  Goal: Optimal Coping  Outcome: Ongoing, Progressing     Problem: Bleeding (Sepsis/Septic Shock)  Goal: Absence of Bleeding  Outcome: Ongoing, Progressing     Problem: Glycemic Control Impaired (Sepsis/Septic Shock)  Goal: Blood Glucose Level Within Desired Range  Outcome: Ongoing, Progressing     Problem: Nutrition Impaired (Sepsis/Septic Shock)  Goal: Optimal Nutrition Intake  Outcome: Ongoing, Progressing     Problem: Oral Intake Inadequate (Acute Kidney Injury/Impairment)  Goal: Optimal Nutrition Intake  Outcome: Ongoing, Progressing     Problem: Fall Injury Risk  Goal: Absence of Fall and Fall-Related Injury  Outcome: Ongoing, Progressing     Problem: Adult Inpatient Plan of Care  Goal: Absence of Hospital-Acquired Illness or Injury  Outcome: Ongoing, Not Progressing  Goal: Readiness for Transition of Care  Outcome: Ongoing, Not Progressing     Problem: Infection Progression (Sepsis/Septic Shock)  Goal: Absence of Infection Signs and Symptoms  Outcome: Ongoing, Not Progressing     Problem: Fluid and Electrolyte Imbalance (Acute Kidney Injury/Impairment)  Goal: Fluid and Electrolyte Balance  Outcome: Ongoing, Not Progressing     Problem: Renal Function Impairment (Acute Kidney Injury/Impairment)  Goal: Effective Renal Function  Outcome: Ongoing, Not Progressing     Problem: Infection  Goal: Absence of Infection Signs and Symptoms  Outcome: Ongoing, Not Progressing     Problem: Adult Inpatient Plan of Care  Goal: Absence of Hospital-Acquired Illness or Injury  Outcome: Ongoing, Not Progressing  Goal: Readiness for Transition of Care  Outcome: Ongoing, Not Progressing     Problem: Infection Progression (Sepsis/Septic Shock)  Goal: Absence of Infection Signs and Symptoms  Outcome: Ongoing, Not  Progressing     Problem: Fluid and Electrolyte Imbalance (Acute Kidney Injury/Impairment)  Goal: Fluid and Electrolyte Balance  Outcome: Ongoing, Not Progressing     Problem: Renal Function Impairment (Acute Kidney Injury/Impairment)  Goal: Effective Renal Function  Outcome: Ongoing, Not Progressing     Problem: Infection  Goal: Absence of Infection Signs and Symptoms  Outcome: Ongoing, Not Progressing     Problem: Adult Inpatient Plan of Care  Goal: Absence of Hospital-Acquired Illness or Injury  Outcome: Ongoing, Not Progressing  Goal: Readiness for Transition of Care  Outcome: Ongoing, Not Progressing     Problem: Infection Progression (Sepsis/Septic Shock)  Goal: Absence of Infection Signs and Symptoms  Outcome: Ongoing, Not Progressing     Problem: Fluid and Electrolyte Imbalance (Acute Kidney Injury/Impairment)  Goal: Fluid and Electrolyte Balance  Outcome: Ongoing, Not Progressing     Problem: Renal Function Impairment (Acute Kidney Injury/Impairment)  Goal: Effective Renal Function  Outcome: Ongoing, Not Progressing     Problem: Infection  Goal: Absence of Infection Signs and Symptoms  Outcome: Ongoing, Not Progressing

## 2022-05-05 NOTE — PROGRESS NOTES
[facility]  Nephrology  Progress Note    Patient Name: Og Garcia  MRN: [id number]  Admission Date: 12/23/2021  Hospital Length of Stay: 133 days  Attending Provider: Cecil Abernathy DO   Primary Care Physician: Hector Arndt DNP, FNP-C  Principal Problem:Denice gangrene    Subjective:     HPI: The patient is known from the previous nephrology consult at [facility].  He is no at Specialty and continues to need dialysis support.      Interval History: The patient is resting.  He was able to do CPAP trials today.  No other acute changes.    Review of patient's allergies indicates:  No Known Allergies  Current Facility-Administered Medications   Medication Frequency    0.9%  NaCl infusion (for blood administration) Q24H PRN    0.9%  NaCl infusion (for blood administration) Q24H PRN    0.9%  NaCl infusion PRN    acetaminophen tablet 500 mg Q6H PRN    albuterol nebulizer solution 2.5 mg Q4H PRN    albuterol-ipratropium 2.5 mg-0.5 mg/3 mL nebulizer solution 3 mL Q6H    bisacodyL EC tablet 10 mg Daily PRN    dextrose 50% injection 12.5 g PRN    diltiaZEM tablet 90 mg Q6H    glucagon (human recombinant) injection 1 mg PRN    guaiFENesin 100 mg/5 ml syrup 200 mg Q6H    heparin (porcine) injection 4,000 Units PRN    heparin (porcine) injection 5,000 Units Q8H    insulin aspart U-100 injection 1-10 Units Q6H    insulin detemir U-100 injection 25 Units QHS    metoprolol injection 5 mg Q4H PRN    metoprolol tartrate (LOPRESSOR) split tablet 12.5 mg BID    ondansetron injection 8 mg Q6H PRN    pantoprazole suspension 40 mg Daily    predniSONE tablet 2.5 mg Daily    sevelamer carbonate pwpk 0.8 g TID WM    silver sulfADIAZINE 1% cream Daily PRN    simethicone chewable tablet 80 mg TID PRN    sodium chloride 0.9% bolus 250 mL PRN       Objective:     Vital Signs (Most Recent):  Temp: 99.1 °F (37.3 °C) (05/05/22 1500)  Pulse: 99 (05/05/22 1501)  Resp: (!) 32 (05/05/22  1501)  BP: 132/64 (05/05/22 1732)  SpO2: 100 % (05/05/22 1501)  O2 Device (Oxygen Therapy): Trach Collar (Placed on Trach Collar) (05/05/22 0940)   Vital Signs (24h Range):  Temp:  [97.4 °F (36.3 °C)-100.3 °F (37.9 °C)] 99.1 °F (37.3 °C)  Pulse:  [] 99  Resp:  [9-40] 32  SpO2:  [94 %-100 %] 100 %  BP: ()/(49-69) 132/64     Weight: 76.6 kg (168 lb 14 oz) (05/05/22 0600)  Body mass index is 23.55 kg/m².  Body surface area is 1.96 meters squared.    I/O last 3 completed shifts:  In: 4320 [P.O.:600; NG/GT:3720]  Out: 1735 [Other:1085; Stool:650]    Physical Exam  Vitals reviewed.   HENT:      Head: Atraumatic.   Cardiovascular:      Rate and Rhythm: Regular rhythm.      Heart sounds: Normal heart sounds.   Pulmonary:      Effort: Pulmonary effort is normal.      Comments: ventilated  Abdominal:      Palpations: Abdomen is soft.   Skin:     General: Skin is warm.   Neurological:      Mental Status: He is alert.       Significant Labs:  BMP:   Recent Labs   Lab 05/02/22  0637   GLU 98   *   K 3.4*   CL 96*   CO2 28   BUN 71*   CREATININE 2.32*   CALCIUM 8.1*     CBC:   Recent Labs   Lab 05/02/22  0637 05/03/22  1905   WBC 13.56*  --    RBC 2.01*  --    HGB 6.3* 7.6*   HCT 18.8* 22.1*   *  --    MCV 93.5  --    MCH 31.3*  --    MCHC 33.5  --         Significant Imaging:  Labs: Reviewed    Assessment/Plan:     * Denice gangrene  Continue wound care  Managed by wound care  Wound care    ESRD (end stage renal disease)  Dialysis MWF  5/3/2022 Continue with scheduled hemodialysis for this patient.  5/5/2022  Continue with dialysis      Hypertension  Blood pressure borderline low.  He is requiring diltiazem for Afib    Chronic condition        Thank you for your consult. I will follow-up with patient. Please contact us if you have any additional questions.    Edwin Pryor Jr, MD  Nephrology  [facility]

## 2022-05-05 NOTE — PROGRESS NOTES
[facility]  Pulmonology  Progress Note    Patient Name: Og Garcia  MRN: [id number]  Admission Date: 12/23/2021  Hospital Length of Stay: 133 days  Code Status: Prior  Attending Provider: Cecil Abernathy DO  Primary Care Provider: Hector Arndt DNP, FNP-C   Principal Problem: Denice gangrene    Subjective:      Interval History:  Patient without complaints    Objective:     Vital Signs (Most Recent):  Temp: 100.3 °F (37.9 °C) (05/05/22 0400)  Pulse: 109 (05/05/22 0600)  Resp: (!) 40 (05/05/22 0600)  BP: 118/60 (05/05/22 0600)  SpO2: 100 % (05/05/22 0600)   Vital Signs (24h Range):  Temp:  [97.4 °F (36.3 °C)-100.3 °F (37.9 °C)] 100.3 °F (37.9 °C)  Pulse:  [] 109  Resp:  [7-40] 40  SpO2:  [96 %-100 %] 100 %  BP: ()/(47-71) 118/60     Weight: 76.6 kg (168 lb 14 oz)  Body mass index is 23.55 kg/m².      Intake/Output Summary (Last 24 hours) at 5/5/2022 0730  Last data filed at 5/5/2022 0600  Gross per 24 hour   Intake 3030 ml   Output 1485 ml   Net 1545 ml       Physical Exam  Vitals reviewed.   Constitutional:       Appearance: Normal appearance.      Interventions: He is not intubated.  HENT:      Head: Normocephalic and atraumatic.      Nose: Nose normal.      Mouth/Throat:      Mouth: Mucous membranes are dry.      Pharynx: Oropharynx is clear.   Eyes:      Extraocular Movements: Extraocular movements intact.      Conjunctiva/sclera: Conjunctivae normal.      Pupils: Pupils are equal, round, and reactive to light.   Cardiovascular:      Rate and Rhythm: Normal rate.      Heart sounds: Normal heart sounds. No murmur heard.  Pulmonary:      Effort: Pulmonary effort is normal. He is not intubated.      Breath sounds: Normal breath sounds.   Abdominal:      General: Abdomen is flat. Bowel sounds are normal.      Palpations: Abdomen is soft.   Musculoskeletal:         General: Normal range of motion.      Cervical back: Normal range of motion and neck supple.      Right  lower leg: No edema.      Left lower leg: No edema.   Skin:     General: Skin is warm and dry.      Capillary Refill: Capillary refill takes less than 2 seconds.   Neurological:      General: No focal deficit present.      Mental Status: He is alert and oriented to person, place, and time.   Psychiatric:         Mood and Affect: Mood normal.         Behavior: Behavior normal.       Vents:  Vent Mode: A/C (05/05/22 0556)  Ventilator Initiated: No (04/23/22 1934)  Set Rate: 0 BPM (05/04/22 1020)  Vt Set: 480 mL (05/05/22 0502)  Pressure Support: 15 cmH20 (05/05/22 0556)  PEEP/CPAP: 5 cmH20 (05/05/22 0556)  Oxygen Concentration (%): 35 (05/05/22 0502)  Peak Airway Pressure: 21 cmH2O (05/05/22 0556)  Plateau Pressure: 20 cmH20 (03/07/22 0500)  Total Ve: 7.6 mL (05/05/22 0556)  F/VT Ratio<105 (RSBI): (!) 21.48 (05/05/22 0100)    Lines/Drains/Airways       Central Venous Catheter Line  Duration                  Hemodialysis Catheter 12/30/21 0900 right internal jugular 125 days              Drain  Duration                  Colostomy 12/18/21 1030 Descending/sigmoid  days         Gastrostomy/Enterostomy 03/09/22 1436 Percutaneous endoscopic gastrostomy (PEG) midline feeding 56 days              Airway  Duration                  Surgical Airway 04/27/22 0856 Shiley;Other (Comment) Cuffed;Long 7 days              Peripheral Intravenous Line  Duration                  Peripheral IV - Single Lumen 04/11/22 1623 18 G Anterior;Proximal;Right Forearm 23 days                    Significant Labs:    CBC/Anemia Profile:  Recent Labs   Lab 05/03/22  1905 05/04/22  0754   HGB 7.6*  --    HCT 22.1*  --    IRON  --  42*   FERRITIN  --  3,947*   FOLATE  --  15.5   [id number]  --  1,144*        Chemistries:  No results for input(s): NA, K, CL, CO2, BUN, CREATININE, CALCIUM, ALBUMIN, PROT, BILITOT, ALKPHOS, ALT, AST, GLUCOSE, MG, PHOS in the last 48 hours.    Recent Lab Results  (Last 5 results in the past 24 hours)         05/05/22  0629   05/04/22  2340   05/04/22  1829   05/04/22  1124   05/04/22  0754        Ferritin         3,947       Folate         15.5       Iron         42       Iron Saturation         28       POC Glucose 80   124   107   94         TIBC         152       Vitamin B-12         1,144                              Significant Imaging:  I have reviewed all pertinent imaging results/findings within the past 24 hours.    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 Will start Clindamycin today -   4/8- clinically looks better- will resume PSV today as tolerated   4/9- increased respiratory rate and tachycardic on exam.  Patient had just been turned.  This is all likely secondary to pain.  Will treat his pain and then increase his CPAP to 4 hours t.i.d. as tolerated.  4/13- to OR today  Then  -continue PSV as tolerated   4/26 - remains on continuous PSV and now doing well  4/27- once trach is changed out we can start some trach collar  Patient had a spell of tachycardia put back on the ventilator restart CPAP trials  05/01/2022 increase weaning trials  05/02/2022 at T-tube trials to continuous CPAP  05/04/2022 increase trach collars to 3 hours t.i.d. and continue continue with CPAP  05/05/2022 increase weaning trials    Hypertension  No change here meds reviewed      Pressure ulcer of sacral region, unstageable  Culture and sensitivity reviewed -    Fever  05/05/2022 little bit of of fever today will watch    Type 2 diabetes mellitus without complication, without long-term current use of insulin  Blood sugar tightly controlled    Acute blood loss anemia  No active bleeding  Hgb 6.0 on 4/11  Patient transfused 1  unit with dialysis yesterday  4/13 -H/H 7.5/22.7  4/15- hgb is 6.2 today. We are going to give another unit of PRBC's today with dialysis  4/16 post transfusion cbc is pending  4/18- hgb is 6.6. Will plan for transfusion with 1 unit of prbc's with next HD  4/21 - 7.2/21.6   4/25- 6.6/19.7 - will transfuse one unit PRBCs with next HD   4/28- Hgb now 8.0 following transfusion  5/2/2022 hemoglobin 7 no need for transfusion yet  5/3/2022 transfuse 1 unit packed red blood cells  05/05/2022 I believe this anemia is probably related to his chronic renal sufficiency a wonder if he is a candidate for Epogen at this point                 Jose Urban MD  Pulmonology  [facility]

## 2022-05-05 NOTE — SUBJECTIVE & OBJECTIVE
Interval History: The patient is resting.  He was able to do CPAP trials today.  No other acute changes.    Review of patient's allergies indicates:  No Known Allergies  Current Facility-Administered Medications   Medication Frequency    0.9%  NaCl infusion (for blood administration) Q24H PRN    0.9%  NaCl infusion (for blood administration) Q24H PRN    0.9%  NaCl infusion PRN    acetaminophen tablet 500 mg Q6H PRN    albuterol nebulizer solution 2.5 mg Q4H PRN    albuterol-ipratropium 2.5 mg-0.5 mg/3 mL nebulizer solution 3 mL Q6H    bisacodyL EC tablet 10 mg Daily PRN    dextrose 50% injection 12.5 g PRN    diltiaZEM tablet 90 mg Q6H    glucagon (human recombinant) injection 1 mg PRN    guaiFENesin 100 mg/5 ml syrup 200 mg Q6H    heparin (porcine) injection 4,000 Units PRN    heparin (porcine) injection 5,000 Units Q8H    insulin aspart U-100 injection 1-10 Units Q6H    insulin detemir U-100 injection 25 Units QHS    metoprolol injection 5 mg Q4H PRN    metoprolol tartrate (LOPRESSOR) split tablet 12.5 mg BID    ondansetron injection 8 mg Q6H PRN    pantoprazole suspension 40 mg Daily    predniSONE tablet 2.5 mg Daily    sevelamer carbonate pwpk 0.8 g TID WM    silver sulfADIAZINE 1% cream Daily PRN    simethicone chewable tablet 80 mg TID PRN    sodium chloride 0.9% bolus 250 mL PRN       Objective:     Vital Signs (Most Recent):  Temp: 99.1 °F (37.3 °C) (05/05/22 1500)  Pulse: 99 (05/05/22 1501)  Resp: (!) 32 (05/05/22 1501)  BP: 132/64 (05/05/22 1732)  SpO2: 100 % (05/05/22 1501)  O2 Device (Oxygen Therapy): Trach Collar (Placed on Trach Collar) (05/05/22 0940)   Vital Signs (24h Range):  Temp:  [97.4 °F (36.3 °C)-100.3 °F (37.9 °C)] 99.1 °F (37.3 °C)  Pulse:  [] 99  Resp:  [9-40] 32  SpO2:  [94 %-100 %] 100 %  BP: ()/(49-69) 132/64     Weight: 76.6 kg (168 lb 14 oz) (05/05/22 0600)  Body mass index is 23.55 kg/m².  Body surface area is 1.96 meters squared.    I/O last 3 completed shifts:  In: 4320  [P.O.:600; NG/GT:3720]  Out: 1735 [Other:1085; Stool:650]    Physical Exam  Vitals reviewed.   HENT:      Head: Atraumatic.   Cardiovascular:      Rate and Rhythm: Regular rhythm.      Heart sounds: Normal heart sounds.   Pulmonary:      Effort: Pulmonary effort is normal.      Comments: ventilated  Abdominal:      Palpations: Abdomen is soft.   Skin:     General: Skin is warm.   Neurological:      Mental Status: He is alert.       Significant Labs:  BMP:   Recent Labs   Lab 05/02/22  0637   GLU 98   *   K 3.4*   CL 96*   CO2 28   BUN 71*   CREATININE 2.32*   CALCIUM 8.1*     CBC:   Recent Labs   Lab 05/02/22  0637 05/03/22  1905   WBC 13.56*  --    RBC 2.01*  --    HGB 6.3* 7.6*   HCT 18.8* 22.1*   *  --    MCV 93.5  --    MCH 31.3*  --    MCHC 33.5  --         Significant Imaging:  Labs: Reviewed

## 2022-05-05 NOTE — ASSESSMENT & PLAN NOTE
Was doing trach collar over the weekend but had to be placed back on the vent earlier this week   - will treat his pain- hopefully this will improve his HR and then start back on weaning trails   4/7- aspirated yesterday - febrile this morning. Will start Clindamycin today -   4/8- clinically looks better- will resume PSV today as tolerated   4/9- increased respiratory rate and tachycardic on exam.  Patient had just been turned.  This is all likely secondary to pain.  Will treat his pain and then increase his CPAP to 4 hours t.i.d. as tolerated.  4/13- to OR today  Then  -continue PSV as tolerated   4/26 - remains on continuous PSV and now doing well  4/27- once trach is changed out we can start some trach collar  Patient had a spell of tachycardia put back on the ventilator restart CPAP trials  05/01/2022 increase weaning trials  05/02/2022 at T-tube trials to continuous CPAP  05/04/2022 increase trach collars to 3 hours t.i.d. and continue continue with CPAP  05/05/2022 increase weaning trials

## 2022-05-05 NOTE — PROGRESS NOTES
Wound care note;  Patient skin was evaluated today with ELSA Young FNP today  Patient in bed, Left eye open, moving head around,alert.  Sacral wound with pink to tan soft loose slough noted. Outer edges moist.  Left lateral heel with firm tan to brown necrotic tissue , slow progression  Right lateral lower leg and ankle improving with red to areas of tan slough, Tendon noted to lateral leg  Right lateral foot .5th toe with soft loose tan slough, slow progression  Cont silvadene cream moist gauze to open areas.   Increased sacral wound to BID dressing.  Cont turn protocol  Waffle boots  On low air loss mattress  Wound care team to follow

## 2022-05-05 NOTE — ASSESSMENT & PLAN NOTE
No active bleeding  Hgb 6.0 on 4/11  Patient transfused 1 unit with dialysis yesterday  4/13 -H/H 7.5/22.7  4/15- hgb is 6.2 today. We are going to give another unit of PRBC's today with dialysis  4/16 post transfusion cbc is pending  4/18- hgb is 6.6. Will plan for transfusion with 1 unit of prbc's with next HD  4/21 - 7.2/21.6   4/25- 6.6/19.7 - will transfuse one unit PRBCs with next HD   4/28- Hgb now 8.0 following transfusion  5/2/2022 hemoglobin 7 no need for transfusion yet  5/3/2022 transfuse 1 unit packed red blood cells  05/05/2022 I believe this anemia is probably related to his chronic renal sufficiency a wonder if he is a candidate for Epogen at this point

## 2022-05-05 NOTE — ASSESSMENT & PLAN NOTE
Dialysis MWF  5/3/2022 Continue with scheduled hemodialysis for this patient.  5/5/2022  Continue with dialysis

## 2022-05-05 NOTE — SUBJECTIVE & OBJECTIVE
Interval History:  Patient without complaints    Objective:     Vital Signs (Most Recent):  Temp: 100.3 °F (37.9 °C) (05/05/22 0400)  Pulse: 109 (05/05/22 0600)  Resp: (!) 40 (05/05/22 0600)  BP: 118/60 (05/05/22 0600)  SpO2: 100 % (05/05/22 0600)   Vital Signs (24h Range):  Temp:  [97.4 °F (36.3 °C)-100.3 °F (37.9 °C)] 100.3 °F (37.9 °C)  Pulse:  [] 109  Resp:  [7-40] 40  SpO2:  [96 %-100 %] 100 %  BP: ()/(47-71) 118/60     Weight: 76.6 kg (168 lb 14 oz)  Body mass index is 23.55 kg/m².      Intake/Output Summary (Last 24 hours) at 5/5/2022 0730  Last data filed at 5/5/2022 0600  Gross per 24 hour   Intake 3030 ml   Output 1485 ml   Net 1545 ml       Physical Exam  Vitals reviewed.   Constitutional:       Appearance: Normal appearance.      Interventions: He is not intubated.  HENT:      Head: Normocephalic and atraumatic.      Nose: Nose normal.      Mouth/Throat:      Mouth: Mucous membranes are dry.      Pharynx: Oropharynx is clear.   Eyes:      Extraocular Movements: Extraocular movements intact.      Conjunctiva/sclera: Conjunctivae normal.      Pupils: Pupils are equal, round, and reactive to light.   Cardiovascular:      Rate and Rhythm: Normal rate.      Heart sounds: Normal heart sounds. No murmur heard.  Pulmonary:      Effort: Pulmonary effort is normal. He is not intubated.      Breath sounds: Normal breath sounds.   Abdominal:      General: Abdomen is flat. Bowel sounds are normal.      Palpations: Abdomen is soft.   Musculoskeletal:         General: Normal range of motion.      Cervical back: Normal range of motion and neck supple.      Right lower leg: No edema.      Left lower leg: No edema.   Skin:     General: Skin is warm and dry.      Capillary Refill: Capillary refill takes less than 2 seconds.   Neurological:      General: No focal deficit present.      Mental Status: He is alert and oriented to person, place, and time.   Psychiatric:         Mood and Affect: Mood normal.          Behavior: Behavior normal.       Vents:  Vent Mode: A/C (05/05/22 0556)  Ventilator Initiated: No (04/23/22 1934)  Set Rate: 0 BPM (05/04/22 1020)  Vt Set: 480 mL (05/05/22 0502)  Pressure Support: 15 cmH20 (05/05/22 0556)  PEEP/CPAP: 5 cmH20 (05/05/22 0556)  Oxygen Concentration (%): 35 (05/05/22 0502)  Peak Airway Pressure: 21 cmH2O (05/05/22 0556)  Plateau Pressure: 20 cmH20 (03/07/22 0500)  Total Ve: 7.6 mL (05/05/22 0556)  F/VT Ratio<105 (RSBI): (!) 21.48 (05/05/22 0100)    Lines/Drains/Airways       Central Venous Catheter Line  Duration                  Hemodialysis Catheter 12/30/21 0900 right internal jugular 125 days              Drain  Duration                  Colostomy 12/18/21 1030 Descending/sigmoid  days         Gastrostomy/Enterostomy 03/09/22 1436 Percutaneous endoscopic gastrostomy (PEG) midline feeding 56 days              Airway  Duration                  Surgical Airway 04/27/22 0856 Shiley;Other (Comment) Cuffed;Long 7 days              Peripheral Intravenous Line  Duration                  Peripheral IV - Single Lumen 04/11/22 1623 18 G Anterior;Proximal;Right Forearm 23 days                    Significant Labs:    CBC/Anemia Profile:  Recent Labs   Lab 05/03/22  1905 05/04/22  0754   HGB 7.6*  --    HCT 22.1*  --    IRON  --  42*   FERRITIN  --  3,947*   FOLATE  --  15.5   [id number]  --  1,144*        Chemistries:  No results for input(s): NA, K, CL, CO2, BUN, CREATININE, CALCIUM, ALBUMIN, PROT, BILITOT, ALKPHOS, ALT, AST, GLUCOSE, MG, PHOS in the last 48 hours.    Recent Lab Results  (Last 5 results in the past 24 hours)        05/05/22  0629   05/04/22  2340   05/04/22  1829   05/04/22  1124   05/04/22  0754        Ferritin         3,947       Folate         15.5       Iron         42       Iron Saturation         28       POC Glucose 80   124   107   94         TIBC         152       Vitamin B-12         1,144                              Significant Imaging:  I have reviewed all  pertinent imaging results/findings within the past 24 hours.

## 2022-05-06 NOTE — PROGRESS NOTES
[facility]  Nephrology  Progress Note    Patient Name: Og Garcia  MRN: [id number]  Admission Date: 12/23/2021  Hospital Length of Stay: 134 days  Attending Provider: Cecil Abernathy DO   Primary Care Physician: Hector Arndt DNP, FNP-C  Principal Problem:Denice gangrene    Subjective:     HPI: The patient is known from the previous nephrology consult at [facility].  He is no at Specialty and continues to need dialysis support.      Interval History: The patient is resting.  He has been doing further CPAP trials.  No other changes.  Continuing with dialysis.    Review of patient's allergies indicates:  No Known Allergies  Current Facility-Administered Medications   Medication Frequency    0.9%  NaCl infusion (for blood administration) Q24H PRN    0.9%  NaCl infusion (for blood administration) Q24H PRN    0.9%  NaCl infusion PRN    acetaminophen tablet 500 mg Q6H PRN    albuterol nebulizer solution 2.5 mg Q4H PRN    albuterol-ipratropium 2.5 mg-0.5 mg/3 mL nebulizer solution 3 mL Q6H    bisacodyL EC tablet 10 mg Daily PRN    dextrose 50% injection 12.5 g PRN    diltiaZEM tablet 90 mg Q6H    glucagon (human recombinant) injection 1 mg PRN    guaiFENesin 100 mg/5 ml syrup 200 mg Q6H    heparin (porcine) injection 4,000 Units PRN    heparin (porcine) injection 5,000 Units Q8H    insulin aspart U-100 injection 1-10 Units Q6H    insulin detemir U-100 injection 25 Units QHS    metoprolol injection 5 mg Q4H PRN    metoprolol tartrate (LOPRESSOR) split tablet 12.5 mg BID    ondansetron injection 8 mg Q6H PRN    pantoprazole suspension 40 mg Daily    predniSONE tablet 2.5 mg Daily    sevelamer carbonate pwpk 0.8 g TID WM    silver sulfADIAZINE 1% cream Daily PRN    simethicone chewable tablet 80 mg TID PRN    sodium chloride 0.9% bolus 250 mL PRN       Objective:     Vital Signs (Most Recent):  Temp: 97.8 °F (36.6 °C) (05/06/22 0400)  Pulse: 101 (05/06/22 0600)  Resp: 12  (05/06/22 0600)  BP: (!) 108/56 (05/06/22 0600)  SpO2: 100 % (05/06/22 0600)  O2 Device (Oxygen Therapy): ventilator (05/06/22 0300)   Vital Signs (24h Range):  Temp:  [97.8 °F (36.6 °C)-100 °F (37.8 °C)] 97.8 °F (36.6 °C)  Pulse:  [] 101  Resp:  [8-39] 12  SpO2:  [94 %-100 %] 100 %  BP: ()/(51-69) 108/56     Weight: 77.2 kg (170 lb 3.1 oz) (05/06/22 0600)  Body mass index is 23.74 kg/m².  Body surface area is 1.97 meters squared.    I/O last 3 completed shifts:  In: 4460 [P.O.:480; I.V.:20; NG/GT:3960]  Out: 1835 [Other:1085; Stool:750]    Physical Exam  Vitals reviewed.   HENT:      Head: Normocephalic.      Mouth/Throat:      Mouth: Mucous membranes are moist.   Cardiovascular:      Rate and Rhythm: Regular rhythm.   Abdominal:      Palpations: Abdomen is soft.   Neurological:      Mental Status: He is alert.       Significant Labs:  BMP:   Recent Labs   Lab 05/02/22  0637   GLU 98   *   K 3.4*   CL 96*   CO2 28   BUN 71*   CREATININE 2.32*   CALCIUM 8.1*     CBC:   Recent Labs   Lab 05/02/22  0637 05/03/22  1905   WBC 13.56*  --    RBC 2.01*  --    HGB 6.3* 7.6*   HCT 18.8* 22.1*   *  --    MCV 93.5  --    MCH 31.3*  --    MCHC 33.5  --         Significant Imaging:  Labs: Reviewed    Assessment/Plan:     * Denice gangrene  Continue wound care  Managed by wound care  Wound care    ESRD (end stage renal disease)  Dialysis MWF  5/3/2022 Continue with scheduled hemodialysis for this patient.  5/5/2022  Continue with dialysis  5/6/2022Dialysis today.        Thank you for your consult. I will follow-up with patient. Please contact us if you have any additional questions.    Edwin Pryor Jr, MD  Nephrology  [facility]

## 2022-05-06 NOTE — PROGRESS NOTES
[facility]  Pulmonology  Progress Note    Patient Name: Og Garcia  MRN: [id number]  Admission Date: 12/23/2021  Hospital Length of Stay: 134 days  Code Status: Prior  Attending Provider: Cecil Abernathy DO  Primary Care Provider: Hector Arndt DNP, FNP-C   Principal Problem: Denice gangrene    Subjective:     Interval History:  Patient without complaints    Objective:     Vital Signs (Most Recent):  Temp: 97.8 °F (36.6 °C) (05/06/22 0400)  Pulse: 93 (05/06/22 0400)  Resp: 10 (05/06/22 0400)  BP: 121/61 (05/06/22 0556)  SpO2: 100 % (05/06/22 0400) Vital Signs (24h Range):  Temp:  [97.8 °F (36.6 °C)-100 °F (37.8 °C)] 97.8 °F (36.6 °C)  Pulse:  [] 93  Resp:  [8-40] 10  SpO2:  [94 %-100 %] 100 %  BP: ()/(51-69) 121/61     Weight: 76.6 kg (168 lb 14 oz)  Body mass index is 23.55 kg/m².      Intake/Output Summary (Last 24 hours) at 5/6/2022 0559  Last data filed at 5/6/2022 0000  Gross per 24 hour   Intake 2540 ml   Output 200 ml   Net 2340 ml       Physical Exam  Vitals reviewed.   Constitutional:       Appearance: Normal appearance.      Interventions: He is not intubated.  HENT:      Head: Normocephalic and atraumatic.      Nose: Nose normal.      Mouth/Throat:      Mouth: Mucous membranes are dry.      Pharynx: Oropharynx is clear.   Eyes:      Extraocular Movements: Extraocular movements intact.      Conjunctiva/sclera: Conjunctivae normal.      Pupils: Pupils are equal, round, and reactive to light.   Cardiovascular:      Rate and Rhythm: Normal rate.      Heart sounds: Normal heart sounds. No murmur heard.  Pulmonary:      Effort: Pulmonary effort is normal. He is not intubated.      Breath sounds: Normal breath sounds.   Abdominal:      General: Abdomen is flat. Bowel sounds are normal.      Palpations: Abdomen is soft.   Musculoskeletal:         General: Normal range of motion.      Cervical back: Normal range of motion and neck supple.      Right lower leg: No  edema.      Left lower leg: No edema.   Skin:     General: Skin is warm and dry.      Capillary Refill: Capillary refill takes less than 2 seconds.   Neurological:      General: No focal deficit present.      Mental Status: He is alert and oriented to person, place, and time.   Psychiatric:         Mood and Affect: Mood normal.         Behavior: Behavior normal.       Vents:  Vent Mode: CPAP / PSV (05/06/22 0046)  Ventilator Initiated: No (04/23/22 1934)  Set Rate: 0 BPM (05/04/22 1020)  Vt Set: 480 mL (05/05/22 0502)  Pressure Support: 15 cmH20 (05/06/22 0300)  PEEP/CPAP: 5 cmH20 (05/06/22 0300)  Oxygen Concentration (%): 35 (05/06/22 0400)  Peak Airway Pressure: 21 cmH2O (05/06/22 0300)  Plateau Pressure: 20 cmH20 (03/07/22 0500)  Total Ve: 5.8 mL (05/06/22 0300)  F/VT Ratio<105 (RSBI): (!) 17.24 (05/06/22 0300)    Lines/Drains/Airways       Central Venous Catheter Line  Duration                  Hemodialysis Catheter 12/30/21 0900 right internal jugular 126 days              Drain  Duration                  Colostomy 12/18/21 1030 Descending/sigmoid  days         Gastrostomy/Enterostomy 03/09/22 1436 Percutaneous endoscopic gastrostomy (PEG) midline feeding 57 days              Airway  Duration                  Surgical Airway 04/27/22 0856 Shiley;Other (Comment) Cuffed;Long 8 days              Peripheral Intravenous Line  Duration                  Peripheral IV - Single Lumen 04/11/22 1623 18 G Anterior;Proximal;Right Forearm 24 days                    Significant Labs:    CBC/Anemia Profile:  Recent Labs   Lab 05/04/22  0754   IRON 42*   FERRITIN 3,947*   FOLATE 15.5   [id number] 1,144*        Chemistries:  No results for input(s): NA, K, CL, CO2, BUN, CREATININE, CALCIUM, ALBUMIN, PROT, BILITOT, ALKPHOS, ALT, AST, GLUCOSE, MG, PHOS in the last 48 hours.    Recent Lab Results         05/06/22  0029   05/05/22  1901   05/05/22  1210   05/05/22  0629        POC Glucose 105   105   104   80                Significant Imaging:  I have reviewed all pertinent imaging results/findings within the past 24 hours.    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 Will start Clindamycin today -   4/8- clinically looks better- will resume PSV today as tolerated   4/9- increased respiratory rate and tachycardic on exam.  Patient had just been turned.  This is all likely secondary to pain.  Will treat his pain and then increase his CPAP to 4 hours t.i.d. as tolerated.  4/13- to OR today  Then  -continue PSV as tolerated   4/26 - remains on continuous PSV and now doing well  4/27- once trach is changed out we can start some trach collar  Patient had a spell of tachycardia put back on the ventilator restart CPAP trials  05/01/2022 increase weaning trials  05/02/2022 at T-tube trials to continuous CPAP  05/04/2022 increase trach collars to 3 hours t.i.d. 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

## 2022-05-06 NOTE — ASSESSMENT & PLAN NOTE
Was doing trach collar over the weekend but had to be placed back on the vent earlier this week   - will treat his pain- hopefully this will improve his HR and then start back on weaning trails   4/7- aspirated yesterday - febrile this morning. Will start Clindamycin today -   4/8- clinically looks better- will resume PSV today as tolerated   4/9- increased respiratory rate and tachycardic on exam.  Patient had just been turned.  This is all likely secondary to pain.  Will treat his pain and then increase his CPAP to 4 hours t.i.d. as tolerated.  4/13- to OR today  Then  -continue PSV as tolerated   4/26 - remains on continuous PSV and now doing well  4/27- once trach is changed out we can start some trach collar  Patient had a spell of tachycardia put back on the ventilator restart CPAP trials  05/01/2022 increase weaning trials  05/02/2022 at T-tube trials to continuous CPAP  05/04/2022 increase trach collars to 3 hours t.i.d. and continue continue with CPAP  05/06/2022 increase weaning trials to 16 hours trach collar today

## 2022-05-06 NOTE — SUBJECTIVE & OBJECTIVE
Interval History: The patient is resting.  He has been doing further CPAP trials.  No other changes.  Continuing with dialysis.    Review of patient's allergies indicates:  No Known Allergies  Current Facility-Administered Medications   Medication Frequency    0.9%  NaCl infusion (for blood administration) Q24H PRN    0.9%  NaCl infusion (for blood administration) Q24H PRN    0.9%  NaCl infusion PRN    acetaminophen tablet 500 mg Q6H PRN    albuterol nebulizer solution 2.5 mg Q4H PRN    albuterol-ipratropium 2.5 mg-0.5 mg/3 mL nebulizer solution 3 mL Q6H    bisacodyL EC tablet 10 mg Daily PRN    dextrose 50% injection 12.5 g PRN    diltiaZEM tablet 90 mg Q6H    glucagon (human recombinant) injection 1 mg PRN    guaiFENesin 100 mg/5 ml syrup 200 mg Q6H    heparin (porcine) injection 4,000 Units PRN    heparin (porcine) injection 5,000 Units Q8H    insulin aspart U-100 injection 1-10 Units Q6H    insulin detemir U-100 injection 25 Units QHS    metoprolol injection 5 mg Q4H PRN    metoprolol tartrate (LOPRESSOR) split tablet 12.5 mg BID    ondansetron injection 8 mg Q6H PRN    pantoprazole suspension 40 mg Daily    predniSONE tablet 2.5 mg Daily    sevelamer carbonate pwpk 0.8 g TID WM    silver sulfADIAZINE 1% cream Daily PRN    simethicone chewable tablet 80 mg TID PRN    sodium chloride 0.9% bolus 250 mL PRN       Objective:     Vital Signs (Most Recent):  Temp: 97.8 °F (36.6 °C) (05/06/22 0400)  Pulse: 101 (05/06/22 0600)  Resp: 12 (05/06/22 0600)  BP: (!) 108/56 (05/06/22 0600)  SpO2: 100 % (05/06/22 0600)  O2 Device (Oxygen Therapy): ventilator (05/06/22 0300)   Vital Signs (24h Range):  Temp:  [97.8 °F (36.6 °C)-100 °F (37.8 °C)] 97.8 °F (36.6 °C)  Pulse:  [] 101  Resp:  [8-39] 12  SpO2:  [94 %-100 %] 100 %  BP: ()/(51-69) 108/56     Weight: 77.2 kg (170 lb 3.1 oz) (05/06/22 0600)  Body mass index is 23.74 kg/m².  Body surface area is 1.97 meters squared.    I/O last 3 completed shifts:  In: 4460  [P.O.:480; I.V.:20; NG/GT:3960]  Out: 1835 [Other:1085; Stool:750]    Physical Exam  Vitals reviewed.   HENT:      Head: Normocephalic.      Mouth/Throat:      Mouth: Mucous membranes are moist.   Cardiovascular:      Rate and Rhythm: Regular rhythm.   Abdominal:      Palpations: Abdomen is soft.   Neurological:      Mental Status: He is alert.       Significant Labs:  BMP:   Recent Labs   Lab 05/02/22  0637   GLU 98   *   K 3.4*   CL 96*   CO2 28   BUN 71*   CREATININE 2.32*   CALCIUM 8.1*     CBC:   Recent Labs   Lab 05/02/22  0637 05/03/22  1905   WBC 13.56*  --    RBC 2.01*  --    HGB 6.3* 7.6*   HCT 18.8* 22.1*   *  --    MCV 93.5  --    MCH 31.3*  --    MCHC 33.5  --         Significant Imaging:  Labs: Reviewed

## 2022-05-06 NOTE — ASSESSMENT & PLAN NOTE
Dialysis MWF  5/3/2022 Continue with scheduled hemodialysis for this patient.  5/5/2022  Continue with dialysis  5/6/2022Dialysis today.

## 2022-05-06 NOTE — SUBJECTIVE & OBJECTIVE
Interval History:  Patient without complaints    Objective:     Vital Signs (Most Recent):  Temp: 97.8 °F (36.6 °C) (05/06/22 0400)  Pulse: 93 (05/06/22 0400)  Resp: 10 (05/06/22 0400)  BP: 121/61 (05/06/22 0556)  SpO2: 100 % (05/06/22 0400) Vital Signs (24h Range):  Temp:  [97.8 °F (36.6 °C)-100 °F (37.8 °C)] 97.8 °F (36.6 °C)  Pulse:  [] 93  Resp:  [8-40] 10  SpO2:  [94 %-100 %] 100 %  BP: ()/(51-69) 121/61     Weight: 76.6 kg (168 lb 14 oz)  Body mass index is 23.55 kg/m².      Intake/Output Summary (Last 24 hours) at 5/6/2022 0559  Last data filed at 5/6/2022 0000  Gross per 24 hour   Intake 2540 ml   Output 200 ml   Net 2340 ml       Physical Exam  Vitals reviewed.   Constitutional:       Appearance: Normal appearance.      Interventions: He is not intubated.  HENT:      Head: Normocephalic and atraumatic.      Nose: Nose normal.      Mouth/Throat:      Mouth: Mucous membranes are dry.      Pharynx: Oropharynx is clear.   Eyes:      Extraocular Movements: Extraocular movements intact.      Conjunctiva/sclera: Conjunctivae normal.      Pupils: Pupils are equal, round, and reactive to light.   Cardiovascular:      Rate and Rhythm: Normal rate.      Heart sounds: Normal heart sounds. No murmur heard.  Pulmonary:      Effort: Pulmonary effort is normal. He is not intubated.      Breath sounds: Normal breath sounds.   Abdominal:      General: Abdomen is flat. Bowel sounds are normal.      Palpations: Abdomen is soft.   Musculoskeletal:         General: Normal range of motion.      Cervical back: Normal range of motion and neck supple.      Right lower leg: No edema.      Left lower leg: No edema.   Skin:     General: Skin is warm and dry.      Capillary Refill: Capillary refill takes less than 2 seconds.   Neurological:      General: No focal deficit present.      Mental Status: He is alert and oriented to person, place, and time.   Psychiatric:         Mood and Affect: Mood normal.         Behavior:  Behavior normal.       Vents:  Vent Mode: CPAP / PSV (05/06/22 0046)  Ventilator Initiated: No (04/23/22 1934)  Set Rate: 0 BPM (05/04/22 1020)  Vt Set: 480 mL (05/05/22 0502)  Pressure Support: 15 cmH20 (05/06/22 0300)  PEEP/CPAP: 5 cmH20 (05/06/22 0300)  Oxygen Concentration (%): 35 (05/06/22 0400)  Peak Airway Pressure: 21 cmH2O (05/06/22 0300)  Plateau Pressure: 20 cmH20 (03/07/22 0500)  Total Ve: 5.8 mL (05/06/22 0300)  F/VT Ratio<105 (RSBI): (!) 17.24 (05/06/22 0300)    Lines/Drains/Airways       Central Venous Catheter Line  Duration                  Hemodialysis Catheter 12/30/21 0900 right internal jugular 126 days              Drain  Duration                  Colostomy 12/18/21 1030 Descending/sigmoid  days         Gastrostomy/Enterostomy 03/09/22 1436 Percutaneous endoscopic gastrostomy (PEG) midline feeding 57 days              Airway  Duration                  Surgical Airway 04/27/22 0856 Shiley;Other (Comment) Cuffed;Long 8 days              Peripheral Intravenous Line  Duration                  Peripheral IV - Single Lumen 04/11/22 1623 18 G Anterior;Proximal;Right Forearm 24 days                    Significant Labs:    CBC/Anemia Profile:  Recent Labs   Lab 05/04/22  0754   IRON 42*   FERRITIN 3,947*   FOLATE 15.5   [id number] 1,144*        Chemistries:  No results for input(s): NA, K, CL, CO2, BUN, CREATININE, CALCIUM, ALBUMIN, PROT, BILITOT, ALKPHOS, ALT, AST, GLUCOSE, MG, PHOS in the last 48 hours.    Recent Lab Results         05/06/22  0029   05/05/22  1901   05/05/22  1210   05/05/22  0629        POC Glucose 105   105   104   80               Significant Imaging:  I have reviewed all pertinent imaging results/findings within the past 24 hours.

## 2022-05-07 NOTE — NURSING
Resting quietly. No distress noted. Trach collar 35% still ongoing. Safety measures in effect. Will report to oncoming shift.

## 2022-05-07 NOTE — SUBJECTIVE & OBJECTIVE
Interval History: No acute events overnight. The patient is currently resting comfortably. Hemodynamically stable. He is oxygenating adequately and doing well with trials.  Objective:     Vital Signs (Most Recent):  Temp: 98.7 °F (37.1 °C) (05/07/22 0000)  Pulse: (!) 112 (05/07/22 0723)  Resp: (!) 32 (05/07/22 0723)  BP: 117/61 (05/07/22 0700)  SpO2: 98 % (05/07/22 0723)   Vital Signs (24h Range):  Temp:  [98.3 °F (36.8 °C)-98.7 °F (37.1 °C)] 98.7 °F (37.1 °C)  Pulse:  [] 112  Resp:  [12-44] 32  SpO2:  [95 %-100 %] 98 %  BP: ()/(41-64) 117/61     Weight: 77.2 kg (170 lb 3.1 oz)  Body mass index is 23.74 kg/m².      Intake/Output Summary (Last 24 hours) at 5/7/2022 1020  Last data filed at 5/6/2022 1800  Gross per 24 hour   Intake 660 ml   Output 2600 ml   Net -1940 ml         Physical Exam  Vitals reviewed.   Constitutional:       General: He is not in acute distress.     Appearance: He is ill-appearing.      Comments: Chronically ill appearing   HENT:      Right Ear: External ear normal.      Left Ear: External ear normal.      Mouth/Throat:      Mouth: Mucous membranes are moist.   Eyes:      Pupils: Pupils are equal, round, and reactive to light.   Cardiovascular:      Rate and Rhythm: Normal rate and regular rhythm.   Pulmonary:      Breath sounds: Normal breath sounds. No wheezing, rhonchi or rales.   Abdominal:      Palpations: Abdomen is soft.   Musculoskeletal:         General: Normal range of motion.      Cervical back: Normal range of motion and neck supple.   Skin:     General: Skin is warm and dry.      Capillary Refill: Capillary refill takes less than 2 seconds.   Neurological:      Mental Status: He is alert. Mental status is at baseline.       Vents:  Vent Mode: CPAP / PSV (05/07/22 0510)  Ventilator Initiated: No (04/23/22 1934)  Set Rate: 0 BPM (05/04/22 1020)  Vt Set: 480 mL (05/05/22 0502)  Pressure Support: 15 cmH20 (05/07/22 0510)  PEEP/CPAP: 5 cmH20 (05/07/22 0510)  Oxygen  Concentration (%): (P) 35 (05/07/22 0800)  Peak Airway Pressure: 20 cmH2O (05/07/22 0510)  Plateau Pressure: 20 cmH20 (03/07/22 0500)  Total Ve: 15.8 mL (05/07/22 0510)  F/VT Ratio<105 (RSBI): (!) 90.91 (05/07/22 0510)    Lines/Drains/Airways       Central Venous Catheter Line  Duration                  Hemodialysis Catheter 12/30/21 0900 right internal jugular 128 days              Drain  Duration                  Colostomy 12/18/21 1030 Descending/sigmoid  days         Gastrostomy/Enterostomy 03/09/22 1436 Percutaneous endoscopic gastrostomy (PEG) midline feeding 58 days              Airway  Duration                  Surgical Airway 04/27/22 0856 Shiley;Other (Comment) Cuffed;Long 10 days              Peripheral Intravenous Line  Duration                  Peripheral IV - Single Lumen 04/11/22 1623 18 G Anterior;Proximal;Right Forearm 25 days                    Significant Labs:    CBC/Anemia Profile:  Recent Labs   Lab 05/06/22  0748   WBC 13.42*   HGB 6.9*   HCT 21.0*   *   MCV 95.0   RDW 15.8*          Chemistries:  Recent Labs   Lab 05/06/22  0748      K 3.1*   CL 99   CO2 27   BUN 69*   CREATININE 1.95*   CALCIUM 9.2         All pertinent labs within the past 24 hours have been reviewed.    Significant Imaging:  I have reviewed all pertinent imaging results/findings within the past 24 hours.

## 2022-05-07 NOTE — ASSESSMENT & PLAN NOTE
Was doing trach collar over the weekend but had to be placed back on the vent earlier this week   - will treat his pain- hopefully this will improve his HR and then start back on weaning trails   4/7- aspirated yesterday - febrile this morning. Will start Clindamycin today -   4/8- clinically looks better- will resume PSV today as tolerated   4/9- increased respiratory rate and tachycardic on exam.  Patient had just been turned.  This is all likely secondary to pain.  Will treat his pain and then increase his CPAP to 4 hours t.i.d. as tolerated.  4/13- to OR today  Then  -continue PSV as tolerated   4/26 - remains on continuous PSV and now doing well  4/27- once trach is changed out we can start some trach collar  Patient had a spell of tachycardia put back on the ventilator restart CPAP trials  05/01/2022 increase weaning trials  05/02/2022 at T-tube trials to continuous CPAP  05/04/2022 increase trach collars to 3 hours t.i.d. and continue continue with CPAP  05/06/2022 increase weaning trials to 16 hours trach collar today  5/7- He is doing well with trach collar. Continue as tolerated

## 2022-05-07 NOTE — PROGRESS NOTES
[facility]  Pulmonology  Progress Note    Patient Name: Og Garcia  MRN: [id number]  Admission Date: 12/23/2021  Hospital Length of Stay: 135 days  Code Status: Prior  Attending Provider: Cecil Abernathy DO  Primary Care Provider: Hector Arndt DNP, FNP-C   Principal Problem: Denice gangrene    Subjective:     Interval History: No acute events overnight. The patient is currently resting comfortably. Hemodynamically stable. He is oxygenating adequately and doing well with trials.  Objective:     Vital Signs (Most Recent):  Temp: 98.7 °F (37.1 °C) (05/07/22 0000)  Pulse: (!) 112 (05/07/22 0723)  Resp: (!) 32 (05/07/22 0723)  BP: 117/61 (05/07/22 0700)  SpO2: 98 % (05/07/22 0723)   Vital Signs (24h Range):  Temp:  [98.3 °F (36.8 °C)-98.7 °F (37.1 °C)] 98.7 °F (37.1 °C)  Pulse:  [] 112  Resp:  [12-44] 32  SpO2:  [95 %-100 %] 98 %  BP: ()/(41-64) 117/61     Weight: 77.2 kg (170 lb 3.1 oz)  Body mass index is 23.74 kg/m².      Intake/Output Summary (Last 24 hours) at 5/7/2022 1020  Last data filed at 5/6/2022 1800  Gross per 24 hour   Intake 660 ml   Output 2600 ml   Net -1940 ml         Physical Exam  Vitals reviewed.   Constitutional:       General: He is not in acute distress.     Appearance: He is ill-appearing.      Comments: Chronically ill appearing   HENT:      Right Ear: External ear normal.      Left Ear: External ear normal.      Mouth/Throat:      Mouth: Mucous membranes are moist.   Eyes:      Pupils: Pupils are equal, round, and reactive to light.   Cardiovascular:      Rate and Rhythm: Normal rate and regular rhythm.   Pulmonary:      Breath sounds: Normal breath sounds. No wheezing, rhonchi or rales.   Abdominal:      Palpations: Abdomen is soft.   Musculoskeletal:         General: Normal range of motion.      Cervical back: Normal range of motion and neck supple.   Skin:     General: Skin is warm and dry.      Capillary Refill: Capillary refill takes less  than 2 seconds.   Neurological:      Mental Status: He is alert. Mental status is at baseline.       Vents:  Vent Mode: CPAP / PSV (05/07/22 0510)  Ventilator Initiated: No (04/23/22 1934)  Set Rate: 0 BPM (05/04/22 1020)  Vt Set: 480 mL (05/05/22 0502)  Pressure Support: 15 cmH20 (05/07/22 0510)  PEEP/CPAP: 5 cmH20 (05/07/22 0510)  Oxygen Concentration (%): (P) 35 (05/07/22 0800)  Peak Airway Pressure: 20 cmH2O (05/07/22 0510)  Plateau Pressure: 20 cmH20 (03/07/22 0500)  Total Ve: 15.8 mL (05/07/22 0510)  F/VT Ratio<105 (RSBI): (!) 90.91 (05/07/22 0510)    Lines/Drains/Airways       Central Venous Catheter Line  Duration                  Hemodialysis Catheter 12/30/21 0900 right internal jugular 128 days              Drain  Duration                  Colostomy 12/18/21 1030 Descending/sigmoid  days         Gastrostomy/Enterostomy 03/09/22 1436 Percutaneous endoscopic gastrostomy (PEG) midline feeding 58 days              Airway  Duration                  Surgical Airway 04/27/22 0856 Marychuy;Other (Comment) Cuffed;Long 10 days              Peripheral Intravenous Line  Duration                  Peripheral IV - Single Lumen 04/11/22 1623 18 G Anterior;Proximal;Right Forearm 25 days                    Significant Labs:    CBC/Anemia Profile:  Recent Labs   Lab 05/06/22  0748   WBC 13.42*   HGB 6.9*   HCT 21.0*   *   MCV 95.0   RDW 15.8*          Chemistries:  Recent Labs   Lab 05/06/22  0748      K 3.1*   CL 99   CO2 27   BUN 69*   CREATININE 1.95*   CALCIUM 9.2         All pertinent labs within the past 24 hours have been reviewed.    Significant Imaging:  I have reviewed all pertinent imaging results/findings within the past 24 hours.    Assessment/Plan:     * Denice gangrene  Stable with no acute issues          Chronic respiratory failure  Was doing trach collar over the weekend but had to be placed back on the vent earlier this week   - will treat his pain- hopefully this will improve his HR  and then start back on weaning trails   4/7- aspirated yesterday - febrile this morning. Will start Clindamycin today -   4/8- clinically looks better- will resume PSV today as tolerated   4/9- increased respiratory rate and tachycardic on exam.  Patient had just been turned.  This is all likely secondary to pain.  Will treat his pain and then increase his CPAP to 4 hours t.i.d. as tolerated.  4/13- to OR today  Then  -continue PSV as tolerated   4/26 - remains on continuous PSV and now doing well  4/27- once trach is changed out we can start some trach collar  Patient had a spell of tachycardia put back on the ventilator restart CPAP trials  05/01/2022 increase weaning trials  05/02/2022 at T-tube trials to continuous CPAP  05/04/2022 increase trach collars to 3 hours t.i.d. and continue continue with CPAP  05/06/2022 increase weaning trials to 16 hours trach collar today  5/7- He is doing well with trach collar. Continue as tolerated    Pressure ulcer of sacral region, unstageable  Wound care is following the patient  Note from 5/3 reviewed and appreciated    Fever  He is now afebrile  Last WBC 13.42    ESRD (end stage renal disease)  Started on HD 12/30   Continue with HD per nephrology      Type 2 diabetes mellitus without complication, without long-term current use of insulin  Blood sugar is well controlled    Acute blood loss anemia  No active bleeding  Hgb 6.0 on 4/11  Patient transfused 1 unit with dialysis yesterday  4/13 -H/H 7.5/22.7  4/15- hgb is 6.2 today. We are going to give another unit of PRBC's today with dialysis  4/16 post transfusion cbc is pending  4/18- hgb is 6.6. 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

## 2022-05-07 NOTE — ASSESSMENT & PLAN NOTE
No active bleeding  Hgb 6.0 on 4/11  Patient transfused 1 unit with dialysis yesterday  4/13 -H/H 7.5/22.7  4/15- hgb is 6.2 today. We are going to give another unit of PRBC's today with dialysis  4/16 post transfusion cbc is pending  4/18- hgb is 6.6. Will plan for transfusion with 1 unit of prbc's with next HD  4/21 - 7.2/21.6   4/25- 6.6/19.7 - will transfuse one unit PRBCs with next HD   4/28- Hgb now 8.0 following transfusion  5/2/2022 hemoglobin 7 no need for transfusion yet  5/3/2022 transfuse 1 unit packed red blood cells  05/05/2022 I believe this anemia is probably related to his chronic renal sufficiency a wonder if he is a candidate for Epogen at this point  5/7-last H&H Is 6.9/21

## 2022-05-07 NOTE — PLAN OF CARE
Problem: Nutrition Impaired (Sepsis/Septic Shock)  Goal: Optimal Nutrition Intake  Outcome: Ongoing, Progressing     Problem: Oral Intake Inadequate (Acute Kidney Injury/Impairment)  Goal: Optimal Nutrition Intake  Outcome: Ongoing, Progressing     Problem: Infection  Goal: Absence of Infection Signs and Symptoms  Outcome: Ongoing, Progressing     Problem: Skin and Tissue Injury (Mechanical Ventilation, Invasive)  Goal: Absence of Device-Related Skin and Tissue Injury  Outcome: Ongoing, Progressing     Problem: Impaired Wound Healing  Goal: Optimal Wound Healing  Outcome: Ongoing, Progressing

## 2022-05-07 NOTE — ASSESSMENT & PLAN NOTE
BP is stable  Currently on rate control medications (cardizem 90 mg q6 and metoprolol 12.5 bid)

## 2022-05-07 NOTE — PROGRESS NOTES
Patient is resting comfortably.  He did open his eyes and seemed to fix and follow    Physical exam general patient is chronically ill-appearing, heart is regular rate and rhythm, he has no pitting edema, lungs are clear to auscultation anteriorly, abdomen is soft with decreased bowel sounds.    Assessment/plan 1.  ESRD-continue HD support  2. Hypertension-continue present antihypertensives patient's systolic blood pressures around 112 today  3. Diabetes mellitus-patient's glucose was around 140 today  4. Secondary hyperparathyroidism-continue Renvela, will check a phosphorus level he may be able to come off the Renvela  5. Anemia-patient's hematocrit is 21%, and stable

## 2022-05-07 NOTE — PLAN OF CARE
Problem: Communication Impairment (Mechanical Ventilation, Invasive)  Goal: Effective Communication  5/7/2022 0743 by Martina Arroyo, LORI  Outcome: Ongoing, Progressing  5/7/2022 0742 by Martina Arroyo, LORI  Outcome: Ongoing, Progressing

## 2022-05-08 NOTE — NURSING
Dr. El aware of patient states states she ordered an CXR , and to monitor for decrease in temp and if hasn't dropped in 1-2 hrs give patient another tylenol.

## 2022-05-08 NOTE — NURSING
Patient in bed no acute distress noted. 8xlt trach intact, patient on trach collar at 40% no resp distress noted. luq peg intact and infusing Nepro at 60ml/hr with no difficulty.right subcl hd cath intact without signs of infection. Right fa midline intact without signs of infiltration or infection. llq colostomy intact/not leaking. Small amt of brown stool noted in bed. Dressing noted to sacral,austyn feet and 1/2 of the calf all dated 5/7/22. Waffle boots intact, scd hose intact, patient turned. Feet elevated on pillows, bed in lowest position bed alarm turned on. cb placed within reach will continue to monitor.

## 2022-05-08 NOTE — SUBJECTIVE & OBJECTIVE
Interval History: The patient had some respiratory distress overnight. He was felt to have aspirated. He was placed back on the ventilator. Tmax of 102.8. This morning he is resting comfortably. Oxygenating adequately.    Objective:     Vital Signs (Most Recent):  Temp: 99.5 °F (37.5 °C) (05/08/22 0800)  Pulse: 88 (05/08/22 0815)  Resp: (!) 27 (05/08/22 0815)  BP: (!) 88/47 (05/08/22 0824)  SpO2: 100 % (05/08/22 0815)   Vital Signs (24h Range):  Temp:  [99 °F (37.2 °C)-102.8 °F (39.3 °C)] 99.5 °F (37.5 °C)  Pulse:  [] 88  Resp:  [14-50] 27  SpO2:  [84 %-100 %] 100 %  BP: ()/(34-72) 88/47     Weight: 77.8 kg (171 lb 8.3 oz)  Body mass index is 23.92 kg/m².      Intake/Output Summary (Last 24 hours) at 5/8/2022 0951  Last data filed at 5/8/2022 0605  Gross per 24 hour   Intake 870 ml   Output --   Net 870 ml       Physical Exam    Vents:  Vent Mode: A/C (05/08/22 0756)  Ventilator Initiated: No (04/23/22 1934)  Set Rate: 14 BPM (05/08/22 0756)  Vt Set: 480 mL (05/08/22 0756)  Pressure Support: 15 cmH20 (05/08/22 0048)  PEEP/CPAP: 5 cmH20 (05/08/22 0756)  Oxygen Concentration (%): 80 (05/08/22 0756)  Peak Airway Pressure: 23 cmH2O (05/08/22 0756)  Plateau Pressure: 20 cmH20 (03/07/22 0500)  Total Ve: 9.8 mL (05/08/22 0756)  F/VT Ratio<105 (RSBI): (!) 57.69 (05/08/22 0756)    Lines/Drains/Airways       Central Venous Catheter Line  Duration                  Hemodialysis Catheter 12/30/21 0900 right internal jugular 128 days              Drain  Duration                  Colostomy 12/18/21 1030 Descending/sigmoid  days         Gastrostomy/Enterostomy 03/09/22 1436 Percutaneous endoscopic gastrostomy (PEG) midline feeding 59 days              Airway  Duration                  Surgical Airway 04/27/22 0856 Marychuy;Other (Comment) Cuffed;Long 11 days              Peripheral Intravenous Line  Duration                  Peripheral IV - Single Lumen 04/11/22 1623 18 G Anterior;Proximal;Right Forearm 26 days                     Significant Labs:    CBC/Anemia Profile:  No results for input(s): WBC, HGB, HCT, PLT, MCV, RDW, IRON, FERRITIN, RETIC, FOLATE, [id number], OCCULTBLOOD in the last 48 hours.     Chemistries:  No results for input(s): NA, K, CL, CO2, BUN, CREATININE, CALCIUM, ALBUMIN, PROT, BILITOT, ALKPHOS, ALT, AST, GLUCOSE, MG, PHOS in the last 48 hours.    All pertinent labs within the past 24 hours have been reviewed.    Significant Imaging:  I have reviewed all pertinent imaging results/findings within the past 24 hours.

## 2022-05-08 NOTE — NURSING
Notified Dr. El of Mr. Garcia continues to have labored breathing,  diaphoretic, resp 43, hr 111, sats 98% on 100% fio2, temp 100.3, please advise with further orders if needed.

## 2022-05-08 NOTE — ASSESSMENT & PLAN NOTE
He is now afebrile  Last WBC 13.42  5/8- patient is felt to have aspirated overnight. Started on zosyn

## 2022-05-08 NOTE — PLAN OF CARE
Problem: Adult Inpatient Plan of Care  Goal: Plan of Care Review  Outcome: Ongoing, Progressing     Problem: Adjustment to Illness (Sepsis/Septic Shock)  Goal: Optimal Coping  Outcome: Ongoing, Progressing     Problem: Glycemic Control Impaired (Sepsis/Septic Shock)  Goal: Blood Glucose Level Within Desired Range  Outcome: Ongoing, Progressing     Problem: Infection Progression (Sepsis/Septic Shock)  Goal: Absence of Infection Signs and Symptoms  Outcome: Ongoing, Progressing     Problem: Nutrition Impaired (Sepsis/Septic Shock)  Goal: Optimal Nutrition Intake  Outcome: Ongoing, Progressing     Problem: Fluid and Electrolyte Imbalance (Acute Kidney Injury/Impairment)  Goal: Fluid and Electrolyte Balance  Outcome: Ongoing, Progressing     Problem: Oral Intake Inadequate (Acute Kidney Injury/Impairment)  Goal: Optimal Nutrition Intake  Outcome: Ongoing, Progressing     Problem: Communication Impairment (Mechanical Ventilation, Invasive)  Goal: Effective Communication  Outcome: Ongoing, Progressing     Problem: Nutrition Impairment (Mechanical Ventilation, Invasive)  Goal: Optimal Nutrition Delivery  Outcome: Ongoing, Progressing

## 2022-05-08 NOTE — ASSESSMENT & PLAN NOTE
Was doing trach collar over the weekend but had to be placed back on the vent earlier this week   - will treat his pain- hopefully this will improve his HR and then start back on weaning trails   4/7- aspirated yesterday - febrile this morning. Will start Clindamycin today -   4/8- clinically looks better- will resume PSV today as tolerated   4/9- increased respiratory rate and tachycardic on exam.  Patient had just been turned.  This is all likely secondary to pain.  Will treat his pain and then increase his CPAP to 4 hours t.i.d. as tolerated.  4/13- to OR today  Then  -continue PSV as tolerated   4/26 - remains on continuous PSV and now doing well  4/27- once trach is changed out we can start some trach collar  Patient had a spell of tachycardia put back on the ventilator restart CPAP trials  05/01/2022 increase weaning trials  05/02/2022 at T-tube trials to continuous CPAP  05/04/2022 increase trach collars to 3 hours t.i.d. and continue continue with CPAP  05/06/2022 increase weaning trials to 16 hours trach collar today  5/7- He is doing well with trach collar. Continue as tolerated  5/8- patient had some respiratory distress overnight after presumed aspiration. Chest xray with opacified right lung. Placed back on ventilator. I have weaned FiO2 from 80% to 60%  Will plan to bronch as soon as we are able

## 2022-05-08 NOTE — PLAN OF CARE
Problem: Adult Inpatient Plan of Care  Goal: Plan of Care Review  Outcome: Ongoing, Progressing  Goal: Patient-Specific Goal (Individualized)  Outcome: Ongoing, Progressing  Goal: Absence of Hospital-Acquired Illness or Injury  Outcome: Ongoing, Progressing  Goal: Optimal Comfort and Wellbeing  Outcome: Ongoing, Progressing  Goal: Readiness for Transition of Care  Outcome: Ongoing, Progressing     Problem: Adjustment to Illness (Sepsis/Septic Shock)  Goal: Optimal Coping  Outcome: Ongoing, Progressing     Problem: Bleeding (Sepsis/Septic Shock)  Goal: Absence of Bleeding  Outcome: Ongoing, Progressing     Problem: Adjustment to Illness (Sepsis/Septic Shock)  Goal: Optimal Coping  Outcome: Ongoing, Progressing     Problem: Bleeding (Sepsis/Septic Shock)  Goal: Absence of Bleeding  Outcome: Ongoing, Progressing     Problem: Glycemic Control Impaired (Sepsis/Septic Shock)  Goal: Blood Glucose Level Within Desired Range  Outcome: Ongoing, Progressing     Problem: Glycemic Control Impaired (Sepsis/Septic Shock)  Goal: Blood Glucose Level Within Desired Range  Outcome: Ongoing, Progressing     Problem: Infection Progression (Sepsis/Septic Shock)  Goal: Absence of Infection Signs and Symptoms  Outcome: Ongoing, Progressing     Problem: Nutrition Impaired (Sepsis/Septic Shock)  Goal: Optimal Nutrition Intake  Outcome: Ongoing, Progressing     Problem: Fluid and Electrolyte Imbalance (Acute Kidney Injury/Impairment)  Goal: Fluid and Electrolyte Balance  Outcome: Ongoing, Progressing     Problem: Nutrition Impaired (Sepsis/Septic Shock)  Goal: Optimal Nutrition Intake  Outcome: Ongoing, Progressing     Problem: Fluid and Electrolyte Imbalance (Acute Kidney Injury/Impairment)  Goal: Fluid and Electrolyte Balance  Outcome: Ongoing, Progressing     Problem: Oral Intake Inadequate (Acute Kidney Injury/Impairment)  Goal: Optimal Nutrition Intake  Outcome: Ongoing, Progressing     Problem: Fluid and Electrolyte Imbalance (Acute  Kidney Injury/Impairment)  Goal: Fluid and Electrolyte Balance  Outcome: Ongoing, Progressing     Problem: Oral Intake Inadequate (Acute Kidney Injury/Impairment)  Goal: Optimal Nutrition Intake  Outcome: Ongoing, Progressing     Problem: Renal Function Impairment (Acute Kidney Injury/Impairment)  Goal: Effective Renal Function  Outcome: Ongoing, Progressing     Problem: Infection  Goal: Absence of Infection Signs and Symptoms  Outcome: Ongoing, Progressing     Problem: Fall Injury Risk  Goal: Absence of Fall and Fall-Related Injury  Outcome: Ongoing, Progressing     Problem: Communication Impairment (Mechanical Ventilation, Invasive)  Goal: Effective Communication  Outcome: Ongoing, Progressing     Problem: Device-Related Complication Risk (Mechanical Ventilation, Invasive)  Goal: Optimal Device Function  Outcome: Ongoing, Progressing     Problem: Inability to Wean (Mechanical Ventilation, Invasive)  Goal: Mechanical Ventilation Liberation  Outcome: Ongoing, Progressing     Problem: Nutrition Impairment (Mechanical Ventilation, Invasive)  Goal: Optimal Nutrition Delivery  Outcome: Ongoing, Progressing     Problem: Skin and Tissue Injury (Mechanical Ventilation, Invasive)  Goal: Absence of Device-Related Skin and Tissue Injury  Outcome: Ongoing, Progressing     Problem: Ventilator-Induced Lung Injury (Mechanical Ventilation, Invasive)  Goal: Absence of Ventilator-Induced Lung Injury  Outcome: Ongoing, Progressing     Problem: Communication Impairment (Artificial Airway)  Goal: Effective Communication  Outcome: Ongoing, Progressing     Problem: Device-Related Complication Risk (Artificial Airway)  Goal: Optimal Device Function  Outcome: Ongoing, Progressing     Problem: Skin and Tissue Injury (Artificial Airway)  Goal: Absence of Device-Related Skin or Tissue Injury  Outcome: Ongoing, Progressing     Problem: Skin Injury Risk Increased  Goal: Skin Health and Integrity  Outcome: Ongoing, Progressing     Problem:  Device-Related Complication Risk (Hemodialysis)  Goal: Safe, Effective Therapy Delivery  Outcome: Ongoing, Progressing     Problem: Hemodynamic Instability (Hemodialysis)  Goal: Effective Tissue Perfusion  Outcome: Ongoing, Progressing     Problem: Infection (Hemodialysis)  Goal: Absence of Infection Signs and Symptoms  Outcome: Ongoing, Progressing     Problem: Diabetes Comorbidity  Goal: Blood Glucose Level Within Targeted Range  Outcome: Ongoing, Progressing     Problem: Impaired Wound Healing  Goal: Optimal Wound Healing  Outcome: Ongoing, Progressing     Problem: Gas Exchange Impaired  Goal: Optimal Gas Exchange  Outcome: Ongoing, Progressing    No change in patient status, will continue to monitor.

## 2022-05-08 NOTE — ASSESSMENT & PLAN NOTE
BP is stable  Currently on rate control medications (cardizem 90 mg q6 and metoprolol 12.5 bid)  5/8 concern for aspiration overnight. BP now low normal

## 2022-05-08 NOTE — NURSING
PATIENT DIAPHORETIC UPON ENTERING THE ROOM. TEMPERATURE 102.8 BLOOD SUGAR 164. GIVEN 500MG ACETAMINOPHEN FOR FEVER. RN NOTIFIED. WILL CONTINUE TO ASSESS.

## 2022-05-08 NOTE — PROGRESS NOTES
[facility]  Pulmonology  Progress Note    Patient Name: Og Garcia  MRN: [id number]  Admission Date: 12/23/2021  Hospital Length of Stay: 136 days  Code Status: Prior  Attending Provider: Cecil Abernathy DO  Primary Care Provider: Hector Arndt DNP, FNP-C   Principal Problem: Denice gangrene    Subjective:     Interval History: The patient had some respiratory distress overnight. He was felt to have aspirated. He was placed back on the ventilator. Tmax of 102.8. This morning he is resting comfortably. Oxygenating adequately.    Objective:     Vital Signs (Most Recent):  Temp: 99.5 °F (37.5 °C) (05/08/22 0800)  Pulse: 88 (05/08/22 0815)  Resp: (!) 27 (05/08/22 0815)  BP: (!) 88/47 (05/08/22 0824)  SpO2: 100 % (05/08/22 0815)   Vital Signs (24h Range):  Temp:  [99 °F (37.2 °C)-102.8 °F (39.3 °C)] 99.5 °F (37.5 °C)  Pulse:  [] 88  Resp:  [14-50] 27  SpO2:  [84 %-100 %] 100 %  BP: ()/(34-72) 88/47     Weight: 77.8 kg (171 lb 8.3 oz)  Body mass index is 23.92 kg/m².      Intake/Output Summary (Last 24 hours) at 5/8/2022 0951  Last data filed at 5/8/2022 0605  Gross per 24 hour   Intake 870 ml   Output --   Net 870 ml       Physical Exam    Vents:  Vent Mode: A/C (05/08/22 0756)  Ventilator Initiated: No (04/23/22 1934)  Set Rate: 14 BPM (05/08/22 0756)  Vt Set: 480 mL (05/08/22 0756)  Pressure Support: 15 cmH20 (05/08/22 0048)  PEEP/CPAP: 5 cmH20 (05/08/22 0756)  Oxygen Concentration (%): 80 (05/08/22 0756)  Peak Airway Pressure: 23 cmH2O (05/08/22 0756)  Plateau Pressure: 20 cmH20 (03/07/22 0500)  Total Ve: 9.8 mL (05/08/22 0756)  F/VT Ratio<105 (RSBI): (!) 57.69 (05/08/22 0756)    Lines/Drains/Airways       Central Venous Catheter Line  Duration                  Hemodialysis Catheter 12/30/21 0900 right internal jugular 128 days              Drain  Duration                  Colostomy 12/18/21 1030 Descending/sigmoid  days         Gastrostomy/Enterostomy 03/09/22  1436 Percutaneous endoscopic gastrostomy (PEG) midline feeding 59 days              Airway  Duration                  Surgical Airway 04/27/22 0856 Shiley;Other (Comment) Cuffed;Long 11 days              Peripheral Intravenous Line  Duration                  Peripheral IV - Single Lumen 04/11/22 1623 18 G Anterior;Proximal;Right Forearm 26 days                    Significant Labs:    CBC/Anemia Profile:  No results for input(s): WBC, HGB, HCT, PLT, MCV, RDW, IRON, FERRITIN, RETIC, FOLATE, [id number], OCCULTBLOOD in the last 48 hours.     Chemistries:  No results for input(s): NA, K, CL, CO2, BUN, CREATININE, CALCIUM, ALBUMIN, PROT, BILITOT, ALKPHOS, ALT, AST, GLUCOSE, MG, PHOS in the last 48 hours.    All pertinent labs within the past 24 hours have been reviewed.    Significant Imaging:  I have reviewed all pertinent imaging results/findings within the past 24 hours.    Assessment/Plan:     * Denice gangrene  Stable with no acute issues          Chronic respiratory failure  Was doing trach collar over the weekend but had to be placed back on the vent earlier this week   - will treat his pain- hopefully this will improve his HR and then start back on weaning trails   4/7- aspirated yesterday - febrile this morning. Will start Clindamycin today -   4/8- clinically looks better- will resume PSV today as tolerated   4/9- increased respiratory rate and tachycardic on exam.  Patient had just been turned.  This is all likely secondary to pain.  Will treat his pain and then increase his CPAP to 4 hours t.i.d. as tolerated.  4/13- to OR today  Then  -continue PSV as tolerated   4/26 - remains on continuous PSV and now doing well  4/27- once trach is changed out we can start some trach collar  Patient had a spell of tachycardia put back on the ventilator restart CPAP trials  05/01/2022 increase weaning trials  05/02/2022 at T-tube trials to continuous CPAP  05/04/2022 increase trach collars to 3 hours t.i.d. and continue  continue with CPAP  05/06/2022 increase weaning trials to 16 hours trach collar today  5/7- He is doing well with trach collar. Continue as tolerated  5/8- patient had some respiratory distress overnight after presumed aspiration. Chest xray with opacified right lung. Placed back on ventilator. I have weaned FiO2 from 80% to 60%  Will plan to bronch as soon as we are able    Fever  He is now afebrile  Last WBC 13.42  5/8- patient is felt to have aspirated overnight. Started on zosyn    ESRD (end stage renal disease)  Started on HD 12/30   Continue with HD per nephrology      Type 2 diabetes mellitus without complication, without long-term current use of insulin  Blood sugar is well controlled    Acute blood loss anemia  No active bleeding  Hgb 6.0 on 4/11  Patient transfused 1 unit with dialysis yesterday  4/13 -H/H 7.5/22.7  4/15- hgb is 6.2 today. We are going to give another unit of PRBC's today with dialysis  4/16 post transfusion cbc is pending  4/18- hgb is 6.6. Will plan for transfusion with 1 unit of prbc's with next HD  4/21 - 7.2/21.6   4/25- 6.6/19.7 - will transfuse one unit PRBCs with next HD   4/28- Hgb now 8.0 following transfusion  5/2/2022 hemoglobin 7 no need for transfusion yet  5/3/2022 transfuse 1 unit packed red blood cells  05/05/2022 I believe this anemia is probably related to his chronic renal sufficiency a wonder if he is a candidate for Epogen at this point  5/7-last H&H Is 6.9/21    Hypertension  BP is stable  Currently on rate control medications (cardizem 90 mg q6 and metoprolol 12.5 bid)  5/8 concern for aspiration overnight. 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

## 2022-05-08 NOTE — NURSING
Notifiedd Dr. El related to patient noted labored breathing, skin cool and clammy, bs 164, bp 132/63, hr 119 (127)  after lopressor 5mg ivp, sats 100% on a/c 14r,480tv,5,100% o2, temp 101.7 tylenol 500mg,resp now 18, decreased from 48, patient did trac collar trail today from 0730 to 0118. please advise with further orders if needed.

## 2022-05-08 NOTE — PROGRESS NOTES
Patient is resting comfortably.  He did open his eyes and seemed to fix and follow    Physical exam general patient is chronically ill-appearing, heart is regular rate and rhythm, he has no pitting edema, lungs are clear to auscultation anteriorly, abdomen is soft with decreased bowel sounds.    Assessment/plan 1.  ESRD-continue HD support  2. Hypotension-patient's systolic blood pressures around 85 today, his heart rate is around 105, he is on metoprolol 12.5 mg p.o. b.i.d., consider stopping metoprolol could give digoxin for rate control if that is what metoprolol is being used for here.  3. Diabetes mellitus-patient's glucose was around 140 today  4. Secondary hyperparathyroidism-continue Renvela, will check a phosphorus level he may be able to come off the Renvela  5. Anemia-patient's hematocrit is 21%, he is some lowish blood pressure in some tachycardia, plus-minus blood transfusion this time.

## 2022-05-09 NOTE — PLAN OF CARE
Problem: Adult Inpatient Plan of Care  Goal: Optimal Comfort and Wellbeing  Outcome: Ongoing, Progressing     Problem: Bleeding (Sepsis/Septic Shock)  Goal: Absence of Bleeding  Outcome: Ongoing, Progressing     Problem: Glycemic Control Impaired (Sepsis/Septic Shock)  Goal: Blood Glucose Level Within Desired Range  Outcome: Ongoing, Progressing     Problem: Nutrition Impaired (Sepsis/Septic Shock)  Goal: Optimal Nutrition Intake  Outcome: Ongoing, Progressing     Problem: Oral Intake Inadequate (Acute Kidney Injury/Impairment)  Goal: Optimal Nutrition Intake  Outcome: Ongoing, Progressing     Problem: Fall Injury Risk  Goal: Absence of Fall and Fall-Related Injury  Outcome: Ongoing, Progressing     Problem: Skin and Tissue Injury (Mechanical Ventilation, Invasive)  Goal: Absence of Device-Related Skin and Tissue Injury  Outcome: Ongoing, Progressing     Problem: Device-Related Complication Risk (Artificial Airway)  Goal: Optimal Device Function  Outcome: Ongoing, Progressing     Problem: Skin and Tissue Injury (Artificial Airway)  Goal: Absence of Device-Related Skin or Tissue Injury  Outcome: Ongoing, Progressing     Problem: Infection (Hemodialysis)  Goal: Absence of Infection Signs and Symptoms  Outcome: Ongoing, Progressing     Problem: Adult Inpatient Plan of Care  Goal: Plan of Care Review  Outcome: Ongoing, Not Progressing  Goal: Patient-Specific Goal (Individualized)  Outcome: Ongoing, Not Progressing  Goal: Absence of Hospital-Acquired Illness or Injury  Outcome: Ongoing, Not Progressing  Goal: Readiness for Transition of Care  Outcome: Ongoing, Not Progressing     Problem: Fluid and Electrolyte Imbalance (Acute Kidney Injury/Impairment)  Goal: Fluid and Electrolyte Balance  Outcome: Ongoing, Not Progressing     Problem: Renal Function Impairment (Acute Kidney Injury/Impairment)  Goal: Effective Renal Function  Outcome: Ongoing, Not Progressing     Problem: Infection  Goal: Absence of Infection Signs and  Symptoms  Outcome: Ongoing, Not Progressing     Problem: Communication Impairment (Mechanical Ventilation, Invasive)  Goal: Effective Communication  Outcome: Ongoing, Not Progressing     Problem: Inability to Wean (Mechanical Ventilation, Invasive)  Goal: Mechanical Ventilation Liberation  Outcome: Ongoing, Not Progressing     Problem: Communication Impairment (Artificial Airway)  Goal: Effective Communication  Outcome: Ongoing, Not Progressing     Problem: Diabetes Comorbidity  Goal: Blood Glucose Level Within Targeted Range  Outcome: Ongoing, Not Progressing     Problem: Impaired Wound Healing  Goal: Optimal Wound Healing  Outcome: Ongoing, Not Progressing

## 2022-05-09 NOTE — SUBJECTIVE & OBJECTIVE
Interval History: The patient is resting.  He tolerated dialysis earlier today.  He is getting PRBCs today.  No other changes.    Review of patient's allergies indicates:  No Known Allergies  Current Facility-Administered Medications   Medication Frequency    0.9%  NaCl infusion (for blood administration) Q24H PRN    0.9%  NaCl infusion PRN    acetaminophen tablet 500 mg Q6H PRN    albuterol nebulizer solution 2.5 mg Q4H PRN    albuterol-ipratropium 2.5 mg-0.5 mg/3 mL nebulizer solution 3 mL Q6H    bisacodyL EC tablet 10 mg Daily PRN    dextrose 50% injection 12.5 g PRN    diltiaZEM tablet 90 mg Q6H    glucagon (human recombinant) injection 1 mg PRN    guaiFENesin 100 mg/5 ml syrup 200 mg Q6H    heparin (porcine) injection 4,000 Units PRN    heparin (porcine) injection 5,000 Units Q8H    insulin aspart U-100 injection 1-10 Units Q6H    insulin detemir U-100 injection 25 Units QHS    metoprolol tartrate (LOPRESSOR) split tablet 12.5 mg BID    ondansetron injection 8 mg Q6H PRN    pantoprazole suspension 40 mg Daily    piperacillin-tazobactam (ZOSYN) 4.5 g in dextrose 5 % in water (D5W) 5 % 100 mL IVPB (MB+) Q12H    predniSONE tablet 2.5 mg Daily    sevelamer carbonate pwpk 0.8 g TID WM    silver sulfADIAZINE 1% cream Daily PRN    simethicone chewable tablet 80 mg TID PRN    sodium chloride 0.9% bolus 250 mL PRN       Objective:     Vital Signs (Most Recent):  Temp: 99.5 °F (37.5 °C) (05/09/22 1440)  Pulse: (!) 120 (05/09/22 1211)  Resp: (!) 31 (05/09/22 1211)  BP: (!) 90/53 (map 60) (05/09/22 1226)  SpO2: 100 % (05/09/22 1211)  O2 Device (Oxygen Therapy): ventilator (05/09/22 1445)   Vital Signs (24h Range):  Temp:  [98.8 °F (37.1 °C)-101 °F (38.3 °C)] 99.5 °F (37.5 °C)  Pulse:  [] 120  Resp:  [16-38] 31  SpO2:  [89 %-100 %] 100 %  BP: ()/(32-63) 90/53     Weight: 73.6 kg (162 lb 4.1 oz) (05/09/22 0852)  Body mass index is 22.63 kg/m².  Body surface area is 1.92 meters squared.    I/O last 3 completed  shifts:  In: 2910 [P.O.:240; Other:120; NG/GT:2150; IV Piggyback:400]  Out: 1000 [Stool:1000]    Physical Exam  Vitals reviewed.   HENT:      Head: Normocephalic.      Mouth/Throat:      Mouth: Mucous membranes are moist.   Cardiovascular:      Rate and Rhythm: Regular rhythm.      Heart sounds: Normal heart sounds.   Pulmonary:      Effort: Pulmonary effort is normal.      Comments: ventliated  Abdominal:      Palpations: Abdomen is soft.   Neurological:      Mental Status: He is alert.       Significant Labs:  BMP:   Recent Labs   Lab 05/09/22  0619   *   *   K 3.7   CL 96*   CO2 23   *   CREATININE 2.85*   CALCIUM 8.9     CBC:   Recent Labs   Lab 05/09/22  0619   WBC 14.65*   RBC 2.07*   HGB 6.6*   HCT 20.2*   *   MCV 97.6*   MCH 31.9*   MCHC 32.7        Significant Imaging:  Labs: Reviewed

## 2022-05-09 NOTE — PROGRESS NOTES
[facility]  Pulmonology  Progress Note    Patient Name: Og Garcia  MRN: [id number]  Admission Date: 12/23/2021  Hospital Length of Stay: 137 days  Code Status: Prior  Attending Provider: Cecil Abernathy DO  Primary Care Provider: Hector Arndt DNP, FNP-C   Principal Problem: Denice gangrene    Subjective:     Interval History: increased resp rate. Awake, alert . Currently on AC. Oxygenating adequately. Tmax 101     Objective:     Vital Signs (Most Recent):  Temp: 99.6 °F (37.6 °C) (05/09/22 0530)  Pulse: (!) 122 (05/09/22 1115)  Resp: (!) 36 (05/09/22 1115)  BP: (!) 92/51 (05/09/22 1115)  SpO2: 100 % (05/09/22 1115)   Vital Signs (24h Range):  Temp:  [98.8 °F (37.1 °C)-101 °F (38.3 °C)] 99.6 °F (37.6 °C)  Pulse:  [] 122  Resp:  [16-38] 36  SpO2:  [89 %-100 %] 100 %  BP: ()/(32-63) 92/51     Weight: 73.6 kg (162 lb 4.1 oz)  Body mass index is 22.63 kg/m².      Intake/Output Summary (Last 24 hours) at 5/9/2022 1136  Last data filed at 5/9/2022 0700  Gross per 24 hour   Intake 1820 ml   Output 1000 ml   Net 820 ml       Physical Exam  Vitals reviewed.   Constitutional:       General: He is not in acute distress.     Appearance: He is ill-appearing.      Comments: Chronically ill appearing   HENT:      Right Ear: External ear normal.      Left Ear: External ear normal.      Mouth/Throat:      Mouth: Mucous membranes are moist.   Eyes:      Pupils: Pupils are equal, round, and reactive to light.   Cardiovascular:      Rate and Rhythm: Normal rate and regular rhythm.   Pulmonary:      Effort: Tachypnea present.      Breath sounds: Rhonchi present.   Abdominal:      Palpations: Abdomen is soft.   Musculoskeletal:         General: Normal range of motion.      Cervical back: Normal range of motion and neck supple.   Skin:     General: Skin is warm and dry.      Capillary Refill: Capillary refill takes less than 2 seconds.   Neurological:      Mental Status: He is alert.  Mental status is at baseline.       Vents:  Vent Mode: A/C (05/09/22 0720)  Ventilator Initiated: No (04/23/22 1934)  Set Rate: 14 BPM (05/09/22 0720)  Vt Set: 480 mL (05/09/22 0720)  Pressure Support: 15 cmH20 (05/09/22 0400)  PEEP/CPAP: 5 cmH20 (05/09/22 0720)  Oxygen Concentration (%): 35 (05/09/22 0720)  Peak Airway Pressure: 24 cmH2O (05/09/22 0720)  Plateau Pressure: 20 cmH20 (03/07/22 0500)  Total Ve: 9.5 mL (05/09/22 0720)  F/VT Ratio<105 (RSBI): (!) 73.39 (05/09/22 0720)    Lines/Drains/Airways       Central Venous Catheter Line  Duration                  Hemodialysis Catheter 12/30/21 0900 right internal jugular 130 days              Drain  Duration                  Colostomy 12/18/21 1030 Descending/sigmoid  days         Gastrostomy/Enterostomy 03/09/22 1436 Percutaneous endoscopic gastrostomy (PEG) midline feeding 60 days              Airway  Duration                  Surgical Airway 04/27/22 0856 Shiley;Other (Comment) Cuffed;Long 12 days              Peripheral Intravenous Line  Duration                  Peripheral IV - Single Lumen 04/11/22 1623 18 G Anterior;Proximal;Right Forearm 27 days                    Significant Labs:    CBC/Anemia Profile:  Recent Labs   Lab 05/09/22 0619   WBC 14.65*   HGB 6.6*   HCT 20.2*   *   MCV 97.6*   RDW 15.7*        Chemistries:  Recent Labs   Lab 05/09/22  0619   *   K 3.7   CL 96*   CO2 23   *   CREATININE 2.85*   CALCIUM 8.9       All pertinent labs within the past 24 hours have been reviewed.    Significant Imaging:  I have reviewed all pertinent imaging results/findings within the past 24 hours.    Assessment/Plan:     Chronic respiratory failure  Was doing trach collar over the weekend but had to be placed back on the vent earlier this week   - will treat his pain- hopefully this will improve his HR and then start back on weaning trails   4/7- aspirated yesterday - febrile this morning. Will start Clindamycin today -   4/8-  clinically looks better- will resume PSV today as tolerated   4/9- increased respiratory rate and tachycardic on exam.  Patient had just been turned.  This is all likely secondary to pain.  Will treat his pain and then increase his CPAP to 4 hours t.i.d. as tolerated.  4/13- to OR today  Then  -continue PSV as tolerated   4/26 - remains on continuous PSV and now doing well  4/27- once trach is changed out we can start some trach collar  Patient had a spell of tachycardia put back on the ventilator restart CPAP trials  05/01/2022 increase weaning trials  05/02/2022 at T-tube trials to continuous CPAP  05/04/2022 increase trach collars to 3 hours t.i.d. and continue continue with CPAP  05/06/2022 increase weaning trials to 16 hours trach collar today  5/7- He is doing well with trach collar. Continue as tolerated  5/8- patient had some respiratory distress overnight after presumed aspiration. Chest xray with opacified right lung. Placed back on ventilator. I have weaned FiO2 from 80% to 60%  5/9- increased RR; oxygenating adequately. Continue on AC     Fever  He is now afebrile  Last WBC 13.42  5/8- patient is felt to have aspirated overnight. Started on zosyn  5/9- tmax 101 in last 24 hours - BC x 2 NGTD- add resp culture -chest xray reviewed -    ESRD (end stage renal disease)  Started on HD 12/30   Continue with HD per nephrology      Type 2 diabetes mellitus without complication, without long-term current use of insulin  Blood sugar is well controlled    Acute blood loss anemia  No active bleeding  Hgb 6.0 on 4/11  Patient transfused 1 unit with dialysis yesterday  4/13 -H/H 7.5/22.7  4/15- hgb is 6.2 today. We are going to give another unit of PRBC's today with dialysis  4/16 post transfusion cbc is pending  4/18- hgb is 6.6. 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

## 2022-05-09 NOTE — SUBJECTIVE & OBJECTIVE
Interval History: increased resp rate. Awake, alert . Currently on AC. Oxygenating adequately. Tmax 101     Objective:     Vital Signs (Most Recent):  Temp: 99.6 °F (37.6 °C) (05/09/22 0530)  Pulse: (!) 122 (05/09/22 1115)  Resp: (!) 36 (05/09/22 1115)  BP: (!) 92/51 (05/09/22 1115)  SpO2: 100 % (05/09/22 1115)   Vital Signs (24h Range):  Temp:  [98.8 °F (37.1 °C)-101 °F (38.3 °C)] 99.6 °F (37.6 °C)  Pulse:  [] 122  Resp:  [16-38] 36  SpO2:  [89 %-100 %] 100 %  BP: ()/(32-63) 92/51     Weight: 73.6 kg (162 lb 4.1 oz)  Body mass index is 22.63 kg/m².      Intake/Output Summary (Last 24 hours) at 5/9/2022 1136  Last data filed at 5/9/2022 0700  Gross per 24 hour   Intake 1820 ml   Output 1000 ml   Net 820 ml       Physical Exam  Vitals reviewed.   Constitutional:       General: He is not in acute distress.     Appearance: He is ill-appearing.      Comments: Chronically ill appearing   HENT:      Right Ear: External ear normal.      Left Ear: External ear normal.      Mouth/Throat:      Mouth: Mucous membranes are moist.   Eyes:      Pupils: Pupils are equal, round, and reactive to light.   Cardiovascular:      Rate and Rhythm: Normal rate and regular rhythm.   Pulmonary:      Effort: Tachypnea present.      Breath sounds: Rhonchi present.   Abdominal:      Palpations: Abdomen is soft.   Musculoskeletal:         General: Normal range of motion.      Cervical back: Normal range of motion and neck supple.   Skin:     General: Skin is warm and dry.      Capillary Refill: Capillary refill takes less than 2 seconds.   Neurological:      Mental Status: He is alert. Mental status is at baseline.       Vents:  Vent Mode: A/C (05/09/22 0720)  Ventilator Initiated: No (04/23/22 1934)  Set Rate: 14 BPM (05/09/22 0720)  Vt Set: 480 mL (05/09/22 0720)  Pressure Support: 15 cmH20 (05/09/22 0400)  PEEP/CPAP: 5 cmH20 (05/09/22 0720)  Oxygen Concentration (%): 35 (05/09/22 0720)  Peak Airway Pressure: 24 cmH2O (05/09/22  0720)  Plateau Pressure: 20 cmH20 (03/07/22 0500)  Total Ve: 9.5 mL (05/09/22 0720)  F/VT Ratio<105 (RSBI): (!) 73.39 (05/09/22 0720)    Lines/Drains/Airways       Central Venous Catheter Line  Duration                  Hemodialysis Catheter 12/30/21 0900 right internal jugular 130 days              Drain  Duration                  Colostomy 12/18/21 1030 Descending/sigmoid  days         Gastrostomy/Enterostomy 03/09/22 1436 Percutaneous endoscopic gastrostomy (PEG) midline feeding 60 days              Airway  Duration                  Surgical Airway 04/27/22 0856 Elizabethley;Other (Comment) Cuffed;Long 12 days              Peripheral Intravenous Line  Duration                  Peripheral IV - Single Lumen 04/11/22 1623 18 G Anterior;Proximal;Right Forearm 27 days                    Significant Labs:    CBC/Anemia Profile:  Recent Labs   Lab 05/09/22 0619   WBC 14.65*   HGB 6.6*   HCT 20.2*   *   MCV 97.6*   RDW 15.7*        Chemistries:  Recent Labs   Lab 05/09/22 0619   *   K 3.7   CL 96*   CO2 23   *   CREATININE 2.85*   CALCIUM 8.9       All pertinent labs within the past 24 hours have been reviewed.    Significant Imaging:  I have reviewed all pertinent imaging results/findings within the past 24 hours.

## 2022-05-09 NOTE — PROGRESS NOTES
[facility]  Adult Nutrition  Follow-up Note         Reason for Assessment  Reason For Assessment: RD follow-up  Additional reasons for assessment:  Nutrition Risk Screen: tube feeding or parenteral nutrition    related to vent, dependent on TF, renal issues    as evidenced by recent dialysis on 5/6/22, weight loss         Nutrition Risk  Level of Risk/Frequency of Follow-up: high   Nutrition Prescription / Recommendations     Recommendation/Intervention: PEG tube feeding: Nepro @ 60ml/hr; H20 flush of 50ml q 4hr  Goals: pt will meet est nutr needs; wound healing ; TF tolerance  Nutrition Goal Status: progressing towards goal  Communication of RD Recs: reviewed with physician  Additional Recommendations:   Monitor and Evaluation  Comments on intake: NPO  Food and Nutrient Intake: enteral nutrition intake  Food and Nutrient Adminstration: enteral and parenteral nutrition administration  Anthropometric Measurements: height/length, weight, weight change, body mass index  Biochemical Data, Medical Tests and Procedures: electrolyte and renal panel, glucose/endocrine profile  Nutrition-Focused Physical Findings: skin  Enteral Calories (kcal): 2592  Enteral Protein (gm): 115  Enteral (Free Water) Fluid (mL): 1037  Free Water Flush Fluid (mL): 300  Parenteral Calories (kcal): 845  Parenteral Protein (gm): 48  Parenteral Fluid (mL): 960  Lipid Calories (kcals): 500 kcals  Other Calories (kcal): 528 (propofol)  Total Calories (kcal): 2160  Total Calories (kcal/kg): 1337  % Kcal Needs: 100  Total Protein (gm): 135  % Protein Needs: 100  IV Fluid (mL): 1764  Total Fluid Intake (mL): 1337  Energy Calories Required: meeting needs  Protein Required: meeting needs  Fluid Required: meeting needs  Tolerance: tolerating  Current Medical Diagnosis and Past Medical History  Diagnosis: renal disease, gastrointestinal disease, infection/sepsis  Past Medical History:   Diagnosis Date    Disseminated mucormycosis  "1/17/2022    Hyperlipidemia     Hypertension     On mechanically assisted ventilation 12/14/2021     Nutrition/Diet History  Spiritual, Cultural Beliefs, Sabianist Practices, Values that Affect Care: no  Supplemental Drinks or Food Habits: Matthew, Nepro  Food Allergies: NKFA  Factors Affecting Nutritional Intake: None identified at this time  Lab/Procedures/Meds  Recent Labs   Lab 05/09/22  0619   *   K 3.7   *   CREATININE 2.85*   *   CALCIUM 8.9   CL 96*     Last A1c: No results found for: HGBA1C  Lab Results   Component Value Date    RBC 2.07 (L) 05/09/2022    HGB 6.6 (L) 05/09/2022    HCT 20.2 (L) 05/09/2022    MCV 97.6 (H) 05/09/2022    MCH 31.9 (H) 05/09/2022    MCHC 32.7 05/09/2022    TIBC 152 (L) 05/04/2022     Pertinent Labs Reviewed: reviewed  Pertinent Labs Comments: na 133, k 3.7, crea 2.85, GFR 24  Pertinent Medications Reviewed: reviewed  Pertinent Medications Comments: heparin, insulin  Anthropometrics  Temp: 99.6 °F (37.6 °C)  Height Method: Stated  Height: 5' 11" (180.3 cm)  Height (inches): 71 in  Weight Method: Bed Scale  Weight: 73.6 kg (162 lb 4.1 oz)  Weight (lb): 162.26 lb  Ideal Body Weight (IBW), Male: 172 lb  % Ideal Body Weight, Male (lb): 94.34 %  BMI (Calculated): 22.6  BMI Grade: 18.5-24.9 - normal     Estimated/Assessed Needs  RMR (Travis-St. Jeor Equation): 1533.13  Total Ve: 9.5 mL Temp: 99.6 °F (37.6 °C)Axillary  Weight Used For Calorie Calculations: 73.6 kg (162 lb 4.1 oz)   Energy Need Method: [facility] Energy Calorie Requirements (kcal): 1956  Weight Used For Protein Calculations: 73.6 kg (162 lb 4.1 oz)  Protein Requirements:   Estimated Fluid Requirement Method: RDA Method Fluid Requirements (mL): 2105  RDA Method (mL): 1956     Nutrition by Nursing  Diet/Nutrition Received: tube feeding  Intake (%): 100%  Diet/Feeding Assistance: total feed  Diet/Feeding Tolerance: other (see comments)  Last Bowel Movement: 05/09/22  [REMOVED]      NG/OG Tube Candido " sump 18 Fr. Left nostril-Feeding Type: continuous, by pump       Gastrostomy/Enterostomy 03/09/22 1436 Percutaneous endoscopic gastrostomy (PEG) midline feeding-Feeding Type: continuous, by pump  [REMOVED]      NG/OG Tube [place] sump 18 Fr. Left nostril-Current Rate (mL/hr): 50 mL/hr       Gastrostomy/Enterostomy 03/09/22 1436 Percutaneous endoscopic gastrostomy (PEG) midline feeding-Current Rate (mL/hr): 60 mL/hr  [REMOVED]      NG/OG Tube [place] sump 18 Fr. Left nostril-Goal Rate (mL/hr): 75 mL/hr       Gastrostomy/Enterostomy 03/09/22 1436 Percutaneous endoscopic gastrostomy (PEG) midline feeding-Goal Rate (mL/hr): 60 mL/hr  [REMOVED]      NG/OG Tube [place] sump 18 Fr. Left nostril-Formula Name: nepro 1.8       Gastrostomy/Enterostomy 03/09/22 1436 Percutaneous endoscopic gastrostomy (PEG) midline feeding-Formula Name: Nepro 1.8  Nutrition Follow-Up  RD Follow-up?: (P) Yes  Assessment and Plan  No new Assessment & Plan notes have been filed under this hospital service since the last note was generated.  Service: Nutrition

## 2022-05-09 NOTE — ASSESSMENT & PLAN NOTE
Dialysis MWF  5/3/2022 Continue with scheduled hemodialysis for this patient.  5/5/2022  Continue with dialysis  5/6/2022Dialysis today.  5/9/2022  Continue with dialysis as scheduled.

## 2022-05-09 NOTE — ASSESSMENT & PLAN NOTE
Was doing trach collar over the weekend but had to be placed back on the vent earlier this week   - will treat his pain- hopefully this will improve his HR and then start back on weaning trails   4/7- aspirated yesterday - febrile this morning. Will start Clindamycin today -   4/8- clinically looks better- will resume PSV today as tolerated   4/9- increased respiratory rate and tachycardic on exam.  Patient had just been turned.  This is all likely secondary to pain.  Will treat his pain and then increase his CPAP to 4 hours t.i.d. as tolerated.  4/13- to OR today  Then  -continue PSV as tolerated   4/26 - remains on continuous PSV and now doing well  4/27- once trach is changed out we can start some trach collar  Patient had a spell of tachycardia put back on the ventilator restart CPAP trials  05/01/2022 increase weaning trials  05/02/2022 at T-tube trials to continuous CPAP  05/04/2022 increase trach collars to 3 hours t.i.d. and continue continue with CPAP  05/06/2022 increase weaning trials to 16 hours trach collar today  5/7- He is doing well with trach collar. Continue as tolerated  5/8- patient had some respiratory distress overnight after presumed aspiration. Chest xray with opacified right lung. Placed back on ventilator. I have weaned FiO2 from 80% to 60%  5/9- increased RR; oxygenating adequately. Continue on AC

## 2022-05-09 NOTE — ASSESSMENT & PLAN NOTE
He is now afebrile  Last WBC 13.42  5/8- patient is felt to have aspirated overnight. Started on zosyn  5/9- tmax 101 in last 24 hours - BC x 2 NGTD- add resp culture -chest xray reviewed -

## 2022-05-09 NOTE — PROGRESS NOTES
[facility]  Nephrology  Progress Note    Patient Name: Og Garcia  MRN: [id number]  Admission Date: 12/23/2021  Hospital Length of Stay: 137 days  Attending Provider: Cecil Abernathy DO   Primary Care Physician: Hector Arndt DNP, FNP-C  Principal Problem:Denice gangrene    Subjective:     HPI: The patient is known from the previous nephrology consult at [facility].  He is no at Specialty and continues to need dialysis support.      Interval History: The patient is resting.  He tolerated dialysis earlier today.  He is getting PRBCs today.  No other changes.    Review of patient's allergies indicates:  No Known Allergies  Current Facility-Administered Medications   Medication Frequency    0.9%  NaCl infusion (for blood administration) Q24H PRN    0.9%  NaCl infusion PRN    acetaminophen tablet 500 mg Q6H PRN    albuterol nebulizer solution 2.5 mg Q4H PRN    albuterol-ipratropium 2.5 mg-0.5 mg/3 mL nebulizer solution 3 mL Q6H    bisacodyL EC tablet 10 mg Daily PRN    dextrose 50% injection 12.5 g PRN    diltiaZEM tablet 90 mg Q6H    glucagon (human recombinant) injection 1 mg PRN    guaiFENesin 100 mg/5 ml syrup 200 mg Q6H    heparin (porcine) injection 4,000 Units PRN    heparin (porcine) injection 5,000 Units Q8H    insulin aspart U-100 injection 1-10 Units Q6H    insulin detemir U-100 injection 25 Units QHS    metoprolol tartrate (LOPRESSOR) split tablet 12.5 mg BID    ondansetron injection 8 mg Q6H PRN    pantoprazole suspension 40 mg Daily    piperacillin-tazobactam (ZOSYN) 4.5 g in dextrose 5 % in water (D5W) 5 % 100 mL IVPB (MB+) Q12H    predniSONE tablet 2.5 mg Daily    sevelamer carbonate pwpk 0.8 g TID WM    silver sulfADIAZINE 1% cream Daily PRN    simethicone chewable tablet 80 mg TID PRN    sodium chloride 0.9% bolus 250 mL PRN       Objective:     Vital Signs (Most Recent):  Temp: 99.5 °F (37.5 °C) (05/09/22 1440)  Pulse: (!) 120 (05/09/22  1211)  Resp: (!) 31 (05/09/22 1211)  BP: (!) 90/53 (map 60) (05/09/22 1226)  SpO2: 100 % (05/09/22 1211)  O2 Device (Oxygen Therapy): ventilator (05/09/22 1445)   Vital Signs (24h Range):  Temp:  [98.8 °F (37.1 °C)-101 °F (38.3 °C)] 99.5 °F (37.5 °C)  Pulse:  [] 120  Resp:  [16-38] 31  SpO2:  [89 %-100 %] 100 %  BP: ()/(32-63) 90/53     Weight: 73.6 kg (162 lb 4.1 oz) (05/09/22 0852)  Body mass index is 22.63 kg/m².  Body surface area is 1.92 meters squared.    I/O last 3 completed shifts:  In: 2910 [P.O.:240; Other:120; NG/GT:2150; IV Piggyback:400]  Out: 1000 [Stool:1000]    Physical Exam  Vitals reviewed.   HENT:      Head: Normocephalic.      Mouth/Throat:      Mouth: Mucous membranes are moist.   Cardiovascular:      Rate and Rhythm: Regular rhythm.      Heart sounds: Normal heart sounds.   Pulmonary:      Effort: Pulmonary effort is normal.      Comments: ventliated  Abdominal:      Palpations: Abdomen is soft.   Neurological:      Mental Status: He is alert.       Significant Labs:  BMP:   Recent Labs   Lab 05/09/22  0619   *   *   K 3.7   CL 96*   CO2 23   *   CREATININE 2.85*   CALCIUM 8.9     CBC:   Recent Labs   Lab 05/09/22  0619   WBC 14.65*   RBC 2.07*   HGB 6.6*   HCT 20.2*   *   MCV 97.6*   MCH 31.9*   MCHC 32.7        Significant Imaging:  Labs: Reviewed    Assessment/Plan:     ESRD (end stage renal disease)  Dialysis MWF  5/3/2022 Continue with scheduled hemodialysis for this patient.  5/5/2022  Continue with dialysis  5/6/2022Dialysis today.  5/9/2022  Continue with dialysis as scheduled.        Thank you for your consult. I will follow-up with patient. Please contact us if you have any additional questions.    Edwin Pryor Jr, MD  Nephrology  [facility]

## 2022-05-09 NOTE — PLAN OF CARE
Problem: Adult Inpatient Plan of Care  Goal: Plan of Care Review  Outcome: Ongoing, Progressing     Problem: Communication Impairment (Mechanical Ventilation, Invasive)  Goal: Effective Communication  Outcome: Ongoing, Progressing     Problem: Device-Related Complication Risk (Mechanical Ventilation, Invasive)  Goal: Optimal Device Function  Outcome: Ongoing, Progressing     Problem: Inability to Wean (Mechanical Ventilation, Invasive)  Goal: Mechanical Ventilation Liberation  Outcome: Ongoing, Progressing     Problem: Nutrition Impairment (Mechanical Ventilation, Invasive)  Goal: Optimal Nutrition Delivery  Outcome: Ongoing, Progressing     Problem: Skin and Tissue Injury (Mechanical Ventilation, Invasive)  Goal: Absence of Device-Related Skin and Tissue Injury  Outcome: Ongoing, Progressing     Problem: Ventilator-Induced Lung Injury (Mechanical Ventilation, Invasive)  Goal: Absence of Ventilator-Induced Lung Injury  Outcome: Ongoing, Progressing     Problem: Communication Impairment (Artificial Airway)  Goal: Effective Communication  Outcome: Ongoing, Progressing     Problem: Device-Related Complication Risk (Artificial Airway)  Goal: Optimal Device Function  Outcome: Ongoing, Progressing     Problem: Skin and Tissue Injury (Artificial Airway)  Goal: Absence of Device-Related Skin or Tissue Injury  Outcome: Ongoing, Progressing     Problem: Skin Injury Risk Increased  Goal: Skin Health and Integrity  Outcome: Ongoing, Progressing     Problem: Gas Exchange Impaired  Goal: Optimal Gas Exchange  Outcome: Ongoing, Progressing

## 2022-05-09 NOTE — PROGRESS NOTES
Pharmacist Renal Dose Adjustment Note    Og Garcia is a 67 y.o. male being treated with the medication Zosyn    Patient Data:    Vital Signs (Most Recent):  Temp: 99.6 °F (37.6 °C) (05/09/22 0530)  Pulse: (!) 116 (05/09/22 0930)  Resp: (!) 35 (05/09/22 0930)  BP: (!) 108/53 (05/09/22 0930)  SpO2: 99 % (05/09/22 0930)   Vital Signs (72h Range):  Temp:  [98.3 °F (36.8 °C)-102.8 °F (39.3 °C)]   Pulse:  []   Resp:  [12-50]   BP: ()/(32-72)   SpO2:  [84 %-100 %]      Recent Labs   Lab 05/06/22  0748 05/09/22 0619   CREATININE 1.95* 2.85*     Serum creatinine: 2.85 mg/dL (H) 05/09/22 0619  Estimated creatinine clearance: 26.2 mL/min (A)    Medication:Zosyn dose: 4.5 grams frequency q8h will be changed to medication:Zosyn dose:4.5 grams frequency:q12h    Pharmacist's Name: Kavitha Golden  Pharmacist's Extension: [phone]

## 2022-05-10 NOTE — SUBJECTIVE & OBJECTIVE
Interval History: No acute events overnight. The patient still with some fever. He is currently resting comfortably. Oxygenating adequately.     Objective:     Vital Signs (Most Recent):  Temp: (!) 101 °F (38.3 °C) (05/10/22 0831)  Pulse: 110 (05/10/22 0824)  Resp: 14 (05/10/22 0800)  BP: (!) 83/43 (05/10/22 0824)  SpO2: 100 % (05/10/22 0800)   Vital Signs (24h Range):  Temp:  [99 °F (37.2 °C)-101.3 °F (38.5 °C)] 101 °F (38.3 °C)  Pulse:  [103-123] 110  Resp:  [9-42] 14  SpO2:  [92 %-100 %] 100 %  BP: ()/(35-59) 83/43     Weight: 73.4 kg (161 lb 13.1 oz)  Body mass index is 22.57 kg/m².      Intake/Output Summary (Last 24 hours) at 5/10/2022 1044  Last data filed at 5/10/2022 0800  Gross per 24 hour   Intake 3543 ml   Output 1300 ml   Net 2243 ml         Physical Exam    Vents:  Vent Mode: CPAP / PSV (05/10/22 0440)  Ventilator Initiated: No (04/23/22 1934)  Set Rate: 14 BPM (05/09/22 1536)  Vt Set: 480 mL (05/09/22 1536)  Pressure Support: 15 cmH20 (05/10/22 0440)  PEEP/CPAP: 5 cmH20 (05/10/22 0440)  Oxygen Concentration (%): 35 (05/10/22 0440)  Peak Airway Pressure: 20 cmH2O (05/10/22 0440)  Plateau Pressure: 20 cmH20 (03/07/22 0500)  Total Ve: 7.8 mL (05/10/22 0440)  F/VT Ratio<105 (RSBI): (!) 28.85 (05/10/22 0440)    Lines/Drains/Airways       Central Venous Catheter Line  Duration                  Hemodialysis Catheter 12/30/21 0900 right internal jugular 131 days              Drain  Duration                  Colostomy 12/18/21 1030 Descending/sigmoid  days         Gastrostomy/Enterostomy 03/09/22 1436 Percutaneous endoscopic gastrostomy (PEG) midline feeding 61 days              Airway  Duration                  Surgical Airway 04/27/22 0856 Maryhcuy;Other (Comment) Cuffed;Long 13 days              Peripheral Intravenous Line  Duration                  Peripheral IV - Single Lumen 04/11/22 1623 18 G Anterior;Proximal;Right Forearm 28 days                    Significant Labs:    CBC/Anemia  Profile:  Recent Labs   Lab 05/09/22 0619   WBC 14.65*   HGB 6.6*   HCT 20.2*   *   MCV 97.6*   RDW 15.7*        Chemistries:  Recent Labs   Lab 05/09/22 0619   *   K 3.7   CL 96*   CO2 23   *   CREATININE 2.85*   CALCIUM 8.9       All pertinent labs within the past 24 hours have been reviewed.    Significant Imaging:  I have reviewed all pertinent imaging results/findings within the past 24 hours.

## 2022-05-10 NOTE — ASSESSMENT & PLAN NOTE
He is now afebrile  Last WBC 13.42  5/8- patient is felt to have aspirated overnight. Started on zosyn  5/10- tmax 101 in last 24 hours - BC x 2 NGTD- add resp culture -chest xray reviewed -

## 2022-05-10 NOTE — PROGRESS NOTES
[facility]  Nephrology  Progress Note    Patient Name: Og Garcia  MRN: [id number]  Admission Date: 12/23/2021  Hospital Length of Stay: 138 days  Attending Provider: Cecil Abernathy DO   Primary Care Physician: Hector Arndt DNP, FNP-C  Principal Problem:Denice gangrene    Subjective:     HPI: The patient is known from the previous nephrology consult at [facility].  He is no at Specialty and continues to need dialysis support.      Interval History: The patient is resting.  He is awake and alert.  No other changes.  Continue with dialysis.    Review of patient's allergies indicates:  No Known Allergies  Current Facility-Administered Medications   Medication Frequency    0.9%  NaCl infusion (for blood administration) Q24H PRN    0.9%  NaCl infusion PRN    acetaminophen tablet 500 mg Q6H PRN    albuterol nebulizer solution 2.5 mg Q4H PRN    albuterol-ipratropium 2.5 mg-0.5 mg/3 mL nebulizer solution 3 mL Q6H    bisacodyL EC tablet 10 mg Daily PRN    dextrose 50% injection 12.5 g PRN    diltiaZEM tablet 90 mg Q6H    glucagon (human recombinant) injection 1 mg PRN    guaiFENesin 100 mg/5 ml syrup 200 mg Q6H    heparin (porcine) injection 4,000 Units PRN    heparin (porcine) injection 5,000 Units Q8H    insulin aspart U-100 injection 1-10 Units Q6H    insulin detemir U-100 injection 25 Units QHS    metoprolol tartrate (LOPRESSOR) split tablet 12.5 mg BID    ondansetron injection 8 mg Q6H PRN    pantoprazole suspension 40 mg Daily    piperacillin-tazobactam (ZOSYN) 4.5 g in dextrose 5 % in water (D5W) 5 % 100 mL IVPB (MB+) Q12H    predniSONE tablet 2.5 mg Daily    sevelamer carbonate pwpk 0.8 g TID WM    silver sulfADIAZINE 1% cream Daily PRN    simethicone chewable tablet 80 mg TID PRN    sodium chloride 0.9% bolus 250 mL PRN       Objective:     Vital Signs (Most Recent):  Temp: (!) 101 °F (38.3 °C) (05/10/22 0831)  Pulse: 101 (05/10/22 1204)  Resp: 10 (05/10/22  1204)  BP: (!) 80/43 (map 55) (05/10/22 1200)  SpO2: 100 % (05/10/22 1204)  O2 Device (Oxygen Therapy): ventilator (05/10/22 1205)   Vital Signs (24h Range):  Temp:  [99 °F (37.2 °C)-101.3 °F (38.5 °C)] 101 °F (38.3 °C)  Pulse:  [101-118] 101  Resp:  [9-37] 10  SpO2:  [92 %-100 %] 100 %  BP: ()/(35-58) 80/43     Weight: 73.4 kg (161 lb 13.1 oz) (05/10/22 0600)  Body mass index is 22.57 kg/m².  Body surface area is 1.92 meters squared.    I/O last 3 completed shifts:  In: 4293 [P.O.:480; I.V.:220; Blood:333; NG/GT:2960; IV Piggyback:300]  Out: 1300 [Other:1000; Stool:300]    Physical Exam  Vitals reviewed.   HENT:      Head: Normocephalic.      Mouth/Throat:      Mouth: Mucous membranes are moist.   Cardiovascular:      Rate and Rhythm: Regular rhythm.   Pulmonary:      Effort: Pulmonary effort is normal.      Comments: Ventilated  Abdominal:      Palpations: Abdomen is soft.   Neurological:      Mental Status: He is alert.       Significant Labs:  BMP:   Recent Labs   Lab 05/09/22  0619   *   *   K 3.7   CL 96*   CO2 23   *   CREATININE 2.85*   CALCIUM 8.9     CBC:   Recent Labs   Lab 05/09/22 0619   WBC 14.65*   RBC 2.07*   HGB 6.6*   HCT 20.2*   *   MCV 97.6*   MCH 31.9*   MCHC 32.7        Significant Imaging:  Labs: Reviewed    Assessment/Plan:     ESRD (end stage renal disease)  Dialysis MWF  5/3/2022 Continue with scheduled hemodialysis for this patient.  5/5/2022  Continue with dialysis  5/6/2022Dialysis today.  5/9/2022  Continue with dialysis as scheduled.  5/10/2022 Dialysis as scheduled.    Type 2 diabetes mellitus without complication, without long-term current use of insulin  Chronic condition  Continue with current therapy        Thank you for your consult. I will follow-up with patient. Please contact us if you have any additional questions.    Edwin Pryor Jr, MD  Nephrology  [facility]

## 2022-05-10 NOTE — SUBJECTIVE & OBJECTIVE
Interval History: The patient is resting.  He is awake and alert.  No other changes.  Continue with dialysis.    Review of patient's allergies indicates:  No Known Allergies  Current Facility-Administered Medications   Medication Frequency    0.9%  NaCl infusion (for blood administration) Q24H PRN    0.9%  NaCl infusion PRN    acetaminophen tablet 500 mg Q6H PRN    albuterol nebulizer solution 2.5 mg Q4H PRN    albuterol-ipratropium 2.5 mg-0.5 mg/3 mL nebulizer solution 3 mL Q6H    bisacodyL EC tablet 10 mg Daily PRN    dextrose 50% injection 12.5 g PRN    diltiaZEM tablet 90 mg Q6H    glucagon (human recombinant) injection 1 mg PRN    guaiFENesin 100 mg/5 ml syrup 200 mg Q6H    heparin (porcine) injection 4,000 Units PRN    heparin (porcine) injection 5,000 Units Q8H    insulin aspart U-100 injection 1-10 Units Q6H    insulin detemir U-100 injection 25 Units QHS    metoprolol tartrate (LOPRESSOR) split tablet 12.5 mg BID    ondansetron injection 8 mg Q6H PRN    pantoprazole suspension 40 mg Daily    piperacillin-tazobactam (ZOSYN) 4.5 g in dextrose 5 % in water (D5W) 5 % 100 mL IVPB (MB+) Q12H    predniSONE tablet 2.5 mg Daily    sevelamer carbonate pwpk 0.8 g TID WM    silver sulfADIAZINE 1% cream Daily PRN    simethicone chewable tablet 80 mg TID PRN    sodium chloride 0.9% bolus 250 mL PRN       Objective:     Vital Signs (Most Recent):  Temp: (!) 101 °F (38.3 °C) (05/10/22 0831)  Pulse: 101 (05/10/22 1204)  Resp: 10 (05/10/22 1204)  BP: (!) 80/43 (map 55) (05/10/22 1200)  SpO2: 100 % (05/10/22 1204)  O2 Device (Oxygen Therapy): ventilator (05/10/22 1205)   Vital Signs (24h Range):  Temp:  [99 °F (37.2 °C)-101.3 °F (38.5 °C)] 101 °F (38.3 °C)  Pulse:  [101-118] 101  Resp:  [9-37] 10  SpO2:  [92 %-100 %] 100 %  BP: ()/(35-58) 80/43     Weight: 73.4 kg (161 lb 13.1 oz) (05/10/22 0600)  Body mass index is 22.57 kg/m².  Body surface area is 1.92 meters squared.    I/O last 3 completed shifts:  In: 6332  [P.O.:480; I.V.:220; Blood:333; NG/GT:2960; IV Piggyback:300]  Out: 1300 [Other:1000; Stool:300]    Physical Exam  Vitals reviewed.   HENT:      Head: Normocephalic.      Mouth/Throat:      Mouth: Mucous membranes are moist.   Cardiovascular:      Rate and Rhythm: Regular rhythm.   Pulmonary:      Effort: Pulmonary effort is normal.      Comments: Ventilated  Abdominal:      Palpations: Abdomen is soft.   Neurological:      Mental Status: He is alert.       Significant Labs:  BMP:   Recent Labs   Lab 05/09/22  0619   *   *   K 3.7   CL 96*   CO2 23   *   CREATININE 2.85*   CALCIUM 8.9     CBC:   Recent Labs   Lab 05/09/22  0619   WBC 14.65*   RBC 2.07*   HGB 6.6*   HCT 20.2*   *   MCV 97.6*   MCH 31.9*   MCHC 32.7        Significant Imaging:  Labs: Reviewed

## 2022-05-10 NOTE — ASSESSMENT & PLAN NOTE
No active bleeding  Hgb 6.0 on 4/11  Patient transfused 1 unit with dialysis yesterday  4/13 -H/H 7.5/22.7  4/15- hgb is 6.2 today. We are going to give another unit of PRBC's today with dialysis  4/16 post transfusion cbc is pending  4/18- hgb is 6.6. Will plan for transfusion with 1 unit of prbc's with next HD  4/21 - 7.2/21.6   4/25- 6.6/19.7 - will transfuse one unit PRBCs with next HD   4/28- Hgb now 8.0 following transfusion  5/2/2022 hemoglobin 7 no need for transfusion yet  5/3/2022 transfuse 1 unit packed red blood cells  05/05/2022 I believe this anemia is probably related to his chronic renal sufficiency a wonder if he is a candidate for Epogen at this point  5/10- transfused 1 unit yesterday

## 2022-05-10 NOTE — ASSESSMENT & PLAN NOTE
Was doing trach collar over the weekend but had to be placed back on the vent earlier this week   - will treat his pain- hopefully this will improve his HR and then start back on weaning trails   4/7- aspirated yesterday - febrile this morning. Will start Clindamycin today -   4/8- clinically looks better- will resume PSV today as tolerated   4/9- increased respiratory rate and tachycardic on exam.  Patient had just been turned.  This is all likely secondary to pain.  Will treat his pain and then increase his CPAP to 4 hours t.i.d. as tolerated.  4/13- to OR today  Then  -continue PSV as tolerated   4/26 - remains on continuous PSV and now doing well  4/27- once trach is changed out we can start some trach collar  Patient had a spell of tachycardia put back on the ventilator restart CPAP trials  05/01/2022 increase weaning trials  05/02/2022 at T-tube trials to continuous CPAP  05/04/2022 increase trach collars to 3 hours t.i.d. and continue continue with CPAP  05/06/2022 increase weaning trials to 16 hours trach collar today  5/7- He is doing well with trach collar. Continue as tolerated  5/8- patient had some respiratory distress overnight after presumed aspiration. Chest xray with opacified right lung. Placed back on ventilator. I have weaned FiO2 from 80% to 60%  5/9- increased RR; oxygenating adequately. Continue on AC   5/10- Looks better this am. Oxygenating adequately. Back on PSV now and will look to start some trach collar back in the next day or two

## 2022-05-10 NOTE — PROGRESS NOTES
[facility]  Pulmonology  Progress Note    Patient Name: Og Garcia  MRN: [id number]  Admission Date: 12/23/2021  Hospital Length of Stay: 138 days  Code Status: Prior  Attending Provider: Cecil Abernathy DO  Primary Care Provider: Hector Arndt DNP, FNP-C   Principal Problem: Denice gangrene    Subjective:     Interval History: No acute events overnight. The patient still with some fever. He is currently resting comfortably. Oxygenating adequately.     Objective:     Vital Signs (Most Recent):  Temp: (!) 101 °F (38.3 °C) (05/10/22 0831)  Pulse: 110 (05/10/22 0824)  Resp: 14 (05/10/22 0800)  BP: (!) 83/43 (05/10/22 0824)  SpO2: 100 % (05/10/22 0800)   Vital Signs (24h Range):  Temp:  [99 °F (37.2 °C)-101.3 °F (38.5 °C)] 101 °F (38.3 °C)  Pulse:  [103-123] 110  Resp:  [9-42] 14  SpO2:  [92 %-100 %] 100 %  BP: ()/(35-59) 83/43     Weight: 73.4 kg (161 lb 13.1 oz)  Body mass index is 22.57 kg/m².      Intake/Output Summary (Last 24 hours) at 5/10/2022 1044  Last data filed at 5/10/2022 0800  Gross per 24 hour   Intake 3543 ml   Output 1300 ml   Net 2243 ml         Physical Exam    Vents:  Vent Mode: CPAP / PSV (05/10/22 0440)  Ventilator Initiated: No (04/23/22 1934)  Set Rate: 14 BPM (05/09/22 1536)  Vt Set: 480 mL (05/09/22 1536)  Pressure Support: 15 cmH20 (05/10/22 0440)  PEEP/CPAP: 5 cmH20 (05/10/22 0440)  Oxygen Concentration (%): 35 (05/10/22 0440)  Peak Airway Pressure: 20 cmH2O (05/10/22 0440)  Plateau Pressure: 20 cmH20 (03/07/22 0500)  Total Ve: 7.8 mL (05/10/22 0440)  F/VT Ratio<105 (RSBI): (!) 28.85 (05/10/22 0440)    Lines/Drains/Airways       Central Venous Catheter Line  Duration                  Hemodialysis Catheter 12/30/21 0900 right internal jugular 131 days              Drain  Duration                  Colostomy 12/18/21 1030 Descending/sigmoid  days         Gastrostomy/Enterostomy 03/09/22 1436 Percutaneous endoscopic gastrostomy (PEG) midline  feeding 61 days              Airway  Duration                  Surgical Airway 04/27/22 0856 Marychuy;Other (Comment) Cuffed;Long 13 days              Peripheral Intravenous Line  Duration                  Peripheral IV - Single Lumen 04/11/22 1623 18 G Anterior;Proximal;Right Forearm 28 days                    Significant Labs:    CBC/Anemia Profile:  Recent Labs   Lab 05/09/22  0619   WBC 14.65*   HGB 6.6*   HCT 20.2*   *   MCV 97.6*   RDW 15.7*        Chemistries:  Recent Labs   Lab 05/09/22 0619   *   K 3.7   CL 96*   CO2 23   *   CREATININE 2.85*   CALCIUM 8.9       All pertinent labs within the past 24 hours have been reviewed.    Significant Imaging:  I have reviewed all pertinent imaging results/findings within the past 24 hours.    Assessment/Plan:     Chronic respiratory failure  Was doing trach collar over the weekend but had to be placed back on the vent earlier this week   - will treat his pain- hopefully this will improve his HR and then start back on weaning trails   4/7- aspirated yesterday - febrile this morning. Will start Clindamycin today -   4/8- clinically looks better- will resume PSV today as tolerated   4/9- increased respiratory rate and tachycardic on exam.  Patient had just been turned.  This is all likely secondary to pain.  Will treat his pain and then increase his CPAP to 4 hours t.i.d. as tolerated.  4/13- to OR today  Then  -continue PSV as tolerated   4/26 - remains on continuous PSV and now doing well  4/27- once trach is changed out we can start some trach collar  Patient had a spell of tachycardia put back on the ventilator restart CPAP trials  05/01/2022 increase weaning trials  05/02/2022 at T-tube trials to continuous CPAP  05/04/2022 increase trach collars to 3 hours t.i.d. and continue continue with CPAP  05/06/2022 increase weaning trials to 16 hours trach collar today  5/7- He is doing well with trach collar. Continue as tolerated  5/8- patient had some  respiratory distress overnight after presumed aspiration. Chest xray with opacified right lung. Placed back on ventilator. I have weaned FiO2 from 80% to 60%  5/9- increased RR; oxygenating adequately. Continue on AC   5/10- Looks better this am. Oxygenating adequately. Back on PSV now and will look to start some trach collar back in the next day or two    Pressure ulcer of sacral region, unstageable  Wound care is following the patient  Note from 5/3 reviewed and appreciated    Fever  He is now afebrile  Last WBC 13.42  5/8- patient is felt to have aspirated overnight. Started on zosyn  5/10- tmax 101 in last 24 hours - BC x 2 NGTD- add resp culture -chest xray reviewed -    ESRD (end stage renal disease)  Started on HD 12/30   Continue with HD per nephrology      Type 2 diabetes mellitus without complication, without long-term current use of insulin  Blood sugar is well controlled    Acute blood loss anemia  No active bleeding  Hgb 6.0 on 4/11  Patient transfused 1 unit with dialysis yesterday  4/13 -H/H 7.5/22.7  4/15- hgb is 6.2 today. We are going to give another unit of PRBC's today with dialysis  4/16 post transfusion cbc is pending  4/18- hgb is 6.6. Will plan for transfusion with 1 unit of prbc's with next HD  4/21 - 7.2/21.6   4/25- 6.6/19.7 - will transfuse one unit PRBCs with next HD   4/28- Hgb now 8.0 following transfusion  5/2/2022 hemoglobin 7 no need for transfusion yet  5/3/2022 transfuse 1 unit packed red blood cells  05/05/2022 I believe this anemia is probably related to his chronic renal sufficiency a wonder if he is a candidate for Epogen at this point  5/10- transfused 1 unit yesterday      Hypertension  BP is stable  Currently on rate control medications (cardizem 90 mg q6 and metoprolol 12.5 bid)  5/8 concern for aspiration overnight. 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

## 2022-05-10 NOTE — ASSESSMENT & PLAN NOTE
BP is stable  Currently on rate control medications (cardizem 90 mg q6 and metoprolol 12.5 bid)  5/8 concern for aspiration overnight. BP now low normal  5/1 BP still on lower side

## 2022-05-10 NOTE — ASSESSMENT & PLAN NOTE
Dialysis MWF  5/3/2022 Continue with scheduled hemodialysis for this patient.  5/5/2022  Continue with dialysis  5/6/2022Dialysis today.  5/9/2022  Continue with dialysis as scheduled.  5/10/2022 Dialysis as scheduled.

## 2022-05-10 NOTE — PLAN OF CARE
Problem: Adult Inpatient Plan of Care  Goal: Optimal Comfort and Wellbeing  Outcome: Ongoing, Progressing     Problem: Adjustment to Illness (Sepsis/Septic Shock)  Goal: Optimal Coping  Outcome: Ongoing, Progressing     Problem: Bleeding (Sepsis/Septic Shock)  Goal: Absence of Bleeding  Outcome: Ongoing, Progressing     Problem: Glycemic Control Impaired (Sepsis/Septic Shock)  Goal: Blood Glucose Level Within Desired Range  Outcome: Ongoing, Progressing     Problem: Nutrition Impaired (Sepsis/Septic Shock)  Goal: Optimal Nutrition Intake  Outcome: Ongoing, Progressing     Problem: Fall Injury Risk  Goal: Absence of Fall and Fall-Related Injury  Outcome: Ongoing, Progressing     Problem: Nutrition Impairment (Mechanical Ventilation, Invasive)  Goal: Optimal Nutrition Delivery  Outcome: Ongoing, Progressing     Problem: Skin and Tissue Injury (Mechanical Ventilation, Invasive)  Goal: Absence of Device-Related Skin and Tissue Injury  Outcome: Ongoing, Progressing     Problem: Device-Related Complication Risk (Artificial Airway)  Goal: Optimal Device Function  Outcome: Ongoing, Progressing     Problem: Skin and Tissue Injury (Artificial Airway)  Goal: Absence of Device-Related Skin or Tissue Injury  Outcome: Ongoing, Progressing     Problem: Adult Inpatient Plan of Care  Goal: Plan of Care Review  Outcome: Ongoing, Not Progressing  Goal: Patient-Specific Goal (Individualized)  Outcome: Ongoing, Not Progressing  Goal: Absence of Hospital-Acquired Illness or Injury  Outcome: Ongoing, Not Progressing  Goal: Readiness for Transition of Care  Outcome: Ongoing, Not Progressing     Problem: Infection Progression (Sepsis/Septic Shock)  Goal: Absence of Infection Signs and Symptoms  Outcome: Ongoing, Not Progressing     Problem: Fluid and Electrolyte Imbalance (Acute Kidney Injury/Impairment)  Goal: Fluid and Electrolyte Balance  Outcome: Ongoing, Not Progressing     Problem: Infection  Goal: Absence of Infection Signs and  Symptoms  Outcome: Ongoing, Not Progressing     Problem: Ventilator-Induced Lung Injury (Mechanical Ventilation, Invasive)  Goal: Absence of Ventilator-Induced Lung Injury  Outcome: Ongoing, Not Progressing     Problem: Skin Injury Risk Increased  Goal: Skin Health and Integrity  Outcome: Ongoing, Not Progressing

## 2022-05-11 NOTE — ASSESSMENT & PLAN NOTE
He is now afebrile  Last WBC 13.42  5/8- patient is felt to have aspirated overnight. Started on zosyn  5/10- tmax 101 in last 24 hours - BC x 2 NGTD- add resp culture -chest xray reviewed -  5/11- afebrile this am. Cultures with no growth to date

## 2022-05-11 NOTE — PROGRESS NOTES
[facility]  Nephrology  Progress Note    Patient Name: Og Garcia  MRN: [id number]  Admission Date: 12/23/2021  Hospital Length of Stay: 139 days  Attending Provider: Cecil Abernathy DO   Primary Care Physician: Hector Arndt DNP, FNP-C  Principal Problem:Denice gangrene    Subjective:     HPI: The patient is known from the previous nephrology consult at [facility].  He is no at Special[facility] and continues to need dialysis support.      Interval History: The patient is resting.  No other changes.    Review of patient's allergies indicates:  No Known Allergies  Current Facility-Administered Medications   Medication Frequency    0.9%  NaCl infusion (for blood administration) Q24H PRN    0.9%  NaCl infusion PRN    acetaminophen tablet 500 mg Q6H PRN    albuterol nebulizer solution 2.5 mg Q4H PRN    albuterol-ipratropium 2.5 mg-0.5 mg/3 mL nebulizer solution 3 mL Q6H    bisacodyL EC tablet 10 mg Daily PRN    dextrose 50% injection 12.5 g PRN    diltiaZEM tablet 90 mg Q6H    glucagon (human recombinant) injection 1 mg PRN    guaiFENesin 100 mg/5 ml syrup 200 mg Q6H    heparin (porcine) injection 4,000 Units PRN    heparin (porcine) injection 5,000 Units Q8H    insulin aspart U-100 injection 1-10 Units Q6H    insulin detemir U-100 injection 25 Units QHS    metoprolol tartrate (LOPRESSOR) split tablet 12.5 mg BID    ondansetron injection 8 mg Q6H PRN    pantoprazole suspension 40 mg Daily    piperacillin-tazobactam (ZOSYN) 4.5 g in dextrose 5 % in water (D5W) 5 % 100 mL IVPB (MB+) Q12H    predniSONE tablet 2.5 mg Daily    sevelamer carbonate pwpk 0.8 g TID WM    silver sulfADIAZINE 1% cream Daily PRN    simethicone chewable tablet 80 mg TID PRN    sodium chloride 0.9% bolus 250 mL PRN       Objective:     Vital Signs (Most Recent):  Temp: 99.2 °F (37.3 °C) (05/11/22 0300)  Pulse: 90 (05/11/22 0749)  Resp: (!) 100 (05/11/22 0749)  BP: (!) 106/50 (05/11/22 0700)  SpO2: (!)  10 % (05/11/22 0749)  O2 Device (Oxygen Therapy): ventilator (05/11/22 0450)   Vital Signs (24h Range):  Temp:  [99.2 °F (37.3 °C)-100.4 °F (38 °C)] 99.2 °F (37.3 °C)  Pulse:  [] 90  Resp:  [7-100] 100  SpO2:  [10 %-100 %] 10 %  BP: ()/(37-56) 106/50     Weight: 73.4 kg (161 lb 13.1 oz) (05/11/22 0400)  Body mass index is 22.57 kg/m².  Body surface area is 1.92 meters squared.    I/O last 3 completed shifts:  In: 4790 [P.O.:720; I.V.:20; NG/GT:3750; IV Piggyback:300]  Out: 1100 [Stool:1100]    Physical Exam  Vitals reviewed.   HENT:      Head: Normocephalic.      Mouth/Throat:      Mouth: Mucous membranes are moist.   Cardiovascular:      Rate and Rhythm: Regular rhythm.   Pulmonary:      Effort: Pulmonary effort is normal.      Comments: The patient is ventilated.  Abdominal:      Palpations: Abdomen is soft.   Neurological:      Mental Status: He is alert.       Significant Labs:  BMP:   Recent Labs   Lab 05/09/22  0619   *   *   K 3.7   CL 96*   CO2 23   *   CREATININE 2.85*   CALCIUM 8.9     CBC:   Recent Labs   Lab 05/09/22  0619   WBC 14.65*   RBC 2.07*   HGB 6.6*   HCT 20.2*   *   MCV 97.6*   MCH 31.9*   MCHC 32.7        Significant Imaging:  Labs: Reviewed    Assessment/Plan:     ESRD (end stage renal disease)  Dialysis MWF  5/3/2022 Continue with scheduled hemodialysis for this patient.  5/5/2022  Continue with dialysis  5/6/2022Dialysis today.  5/9/2022  Continue with dialysis as scheduled.  5/10/2022 Dialysis as scheduled.  5/11/2022 Continue with current therapy.    Acute blood loss anemia  1.  The patient is receiving PRBCs.  5/11/2022 Anemia is chronic and patient has required PRBCs        Thank you for your consult. I will follow-up with patient. Please contact us if you have any additional questions.    Edwin Pryor Jr, MD  Nephrology  [facility]

## 2022-05-11 NOTE — ASSESSMENT & PLAN NOTE
Dialysis MWF  5/3/2022 Continue with scheduled hemodialysis for this patient.  5/5/2022  Continue with dialysis  5/6/2022Dialysis today.  5/9/2022  Continue with dialysis as scheduled.  5/10/2022 Dialysis as scheduled.  5/11/2022 Continue with current therapy.

## 2022-05-11 NOTE — SUBJECTIVE & OBJECTIVE
Interval History: No acute events overnight. The patient is afebrile this am. He is currently resting comfortably. Oxygenating adequately.     Objective:     Vital Signs (Most Recent):  Temp: 98.8 °F (37.1 °C) (05/11/22 1500)  Pulse: 107 (05/11/22 1701)  Resp: (!) 27 (05/11/22 1701)  BP: (!) 99/57 (05/11/22 1701)  SpO2: 100 % (05/11/22 1701)   Vital Signs (24h Range):  Temp:  [98.2 °F (36.8 °C)-100.4 °F (38 °C)] 98.8 °F (37.1 °C)  Pulse:  [] 107  Resp:  [7-100] 27  SpO2:  [10 %-100 %] 100 %  BP: ()/(37-59) 99/57     Weight: 73.4 kg (161 lb 13.1 oz)  Body mass index is 22.57 kg/m².      Intake/Output Summary (Last 24 hours) at 5/11/2022 1723  Last data filed at 5/11/2022 1700  Gross per 24 hour   Intake 4860 ml   Output 900 ml   Net 3960 ml         Physical Exam    Vents:  Vent Mode: CPAP PSV (05/11/22 1240)  Ventilator Initiated: No (04/23/22 1934)  Set Rate: 14 BPM (05/09/22 1536)  Vt Set: 480 mL (05/09/22 1536)  Pressure Support: 12 cmH20 (05/11/22 1240)  PEEP/CPAP: 5 cmH20 (05/11/22 1240)  Oxygen Concentration (%): 35 (05/11/22 1715)  Peak Airway Pressure: 21 cmH2O (05/11/22 0843)  Plateau Pressure: 20 cmH20 (03/07/22 0500)  Total Ve: 8.3 mL (05/11/22 1240)  F/VT Ratio<105 (RSBI): (!) 31.65 (05/11/22 0450)    Lines/Drains/Airways       Central Venous Catheter Line  Duration                  Hemodialysis Catheter 12/30/21 0900 right internal jugular 132 days              Drain  Duration                  Colostomy 12/18/21 1030 Descending/sigmoid  days         Gastrostomy/Enterostomy 03/09/22 1436 Percutaneous endoscopic gastrostomy (PEG) midline feeding 63 days              Airway  Duration                  Surgical Airway 04/27/22 0856 Marychuy;Other (Comment) Cuffed;Long 14 days              Peripheral Intravenous Line  Duration                  Peripheral IV - Single Lumen 04/11/22 1623 18 G Anterior;Proximal;Right Forearm 30 days                    Significant Labs:    CBC/Anemia Profile:  No  results for input(s): WBC, HGB, HCT, PLT, MCV, RDW, IRON, FERRITIN, RETIC, FOLATE, [id number], OCCULTBLOOD in the last 48 hours.       Chemistries:  No results for input(s): NA, K, CL, CO2, BUN, CREATININE, CALCIUM, ALBUMIN, PROT, BILITOT, ALKPHOS, ALT, AST, GLUCOSE, MG, PHOS in the last 48 hours.      All pertinent labs within the past 24 hours have been reviewed.    Significant Imaging:  I have reviewed all pertinent imaging results/findings within the past 24 hours.

## 2022-05-11 NOTE — SUBJECTIVE & OBJECTIVE
Interval History: The patient is resting.  No other changes.    Review of patient's allergies indicates:  No Known Allergies  Current Facility-Administered Medications   Medication Frequency    0.9%  NaCl infusion (for blood administration) Q24H PRN    0.9%  NaCl infusion PRN    acetaminophen tablet 500 mg Q6H PRN    albuterol nebulizer solution 2.5 mg Q4H PRN    albuterol-ipratropium 2.5 mg-0.5 mg/3 mL nebulizer solution 3 mL Q6H    bisacodyL EC tablet 10 mg Daily PRN    dextrose 50% injection 12.5 g PRN    diltiaZEM tablet 90 mg Q6H    glucagon (human recombinant) injection 1 mg PRN    guaiFENesin 100 mg/5 ml syrup 200 mg Q6H    heparin (porcine) injection 4,000 Units PRN    heparin (porcine) injection 5,000 Units Q8H    insulin aspart U-100 injection 1-10 Units Q6H    insulin detemir U-100 injection 25 Units QHS    metoprolol tartrate (LOPRESSOR) split tablet 12.5 mg BID    ondansetron injection 8 mg Q6H PRN    pantoprazole suspension 40 mg Daily    piperacillin-tazobactam (ZOSYN) 4.5 g in dextrose 5 % in water (D5W) 5 % 100 mL IVPB (MB+) Q12H    predniSONE tablet 2.5 mg Daily    sevelamer carbonate pwpk 0.8 g TID WM    silver sulfADIAZINE 1% cream Daily PRN    simethicone chewable tablet 80 mg TID PRN    sodium chloride 0.9% bolus 250 mL PRN       Objective:     Vital Signs (Most Recent):  Temp: 99.2 °F (37.3 °C) (05/11/22 0300)  Pulse: 90 (05/11/22 0749)  Resp: (!) 100 (05/11/22 0749)  BP: (!) 106/50 (05/11/22 0700)  SpO2: (!) 10 % (05/11/22 0749)  O2 Device (Oxygen Therapy): ventilator (05/11/22 0450)   Vital Signs (24h Range):  Temp:  [99.2 °F (37.3 °C)-100.4 °F (38 °C)] 99.2 °F (37.3 °C)  Pulse:  [] 90  Resp:  [7-100] 100  SpO2:  [10 %-100 %] 10 %  BP: ()/(37-56) 106/50     Weight: 73.4 kg (161 lb 13.1 oz) (05/11/22 0400)  Body mass index is 22.57 kg/m².  Body surface area is 1.92 meters squared.    I/O last 3 completed shifts:  In: 4790 [P.O.:720; I.V.:20; NG/GT:3750; IV Piggyback:300]  Out:  1100 [Stool:1100]    Physical Exam  Vitals reviewed.   HENT:      Head: Normocephalic.      Mouth/Throat:      Mouth: Mucous membranes are moist.   Cardiovascular:      Rate and Rhythm: Regular rhythm.   Pulmonary:      Effort: Pulmonary effort is normal.      Comments: The patient is ventilated.  Abdominal:      Palpations: Abdomen is soft.   Neurological:      Mental Status: He is alert.       Significant Labs:  BMP:   Recent Labs   Lab 05/09/22  0619   *   *   K 3.7   CL 96*   CO2 23   *   CREATININE 2.85*   CALCIUM 8.9     CBC:   Recent Labs   Lab 05/09/22  0619   WBC 14.65*   RBC 2.07*   HGB 6.6*   HCT 20.2*   *   MCV 97.6*   MCH 31.9*   MCHC 32.7        Significant Imaging:  Labs: Reviewed

## 2022-05-11 NOTE — PLAN OF CARE
Problem: Adult Inpatient Plan of Care  Goal: Plan of Care Review  Outcome: Ongoing, Progressing  Goal: Patient-Specific Goal (Individualized)  Outcome: Ongoing, Progressing  Goal: Absence of Hospital-Acquired Illness or Injury  Outcome: Ongoing, Progressing  Goal: Readiness for Transition of Care  Outcome: Ongoing, Progressing     Problem: Adjustment to Illness (Sepsis/Septic Shock)  Goal: Optimal Coping  Outcome: Ongoing, Progressing     Problem: Bleeding (Sepsis/Septic Shock)  Goal: Absence of Bleeding  Outcome: Ongoing, Progressing     Problem: Glycemic Control Impaired (Sepsis/Septic Shock)  Goal: Blood Glucose Level Within Desired Range  Outcome: Ongoing, Progressing     Problem: Infection Progression (Sepsis/Septic Shock)  Goal: Absence of Infection Signs and Symptoms  Outcome: Ongoing, Progressing     Problem: Nutrition Impaired (Sepsis/Septic Shock)  Goal: Optimal Nutrition Intake  Outcome: Ongoing, Progressing     Problem: Fluid and Electrolyte Imbalance (Acute Kidney Injury/Impairment)  Goal: Fluid and Electrolyte Balance  Outcome: Ongoing, Progressing     Problem: Renal Function Impairment (Acute Kidney Injury/Impairment)  Goal: Effective Renal Function  Outcome: Ongoing, Progressing     Problem: Fall Injury Risk  Goal: Absence of Fall and Fall-Related Injury  Outcome: Ongoing, Progressing     Problem: Communication Impairment (Mechanical Ventilation, Invasive)  Goal: Effective Communication  Outcome: Ongoing, Progressing     Problem: Device-Related Complication Risk (Mechanical Ventilation, Invasive)  Goal: Optimal Device Function  Outcome: Ongoing, Progressing     Problem: Inability to Wean (Mechanical Ventilation, Invasive)  Goal: Mechanical Ventilation Liberation  Outcome: Ongoing, Progressing     Problem: Ventilator-Induced Lung Injury (Mechanical Ventilation, Invasive)  Goal: Absence of Ventilator-Induced Lung Injury  Outcome: Ongoing, Progressing     Problem: Device-Related Complication Risk  (Artificial Airway)  Goal: Optimal Device Function  Outcome: Ongoing, Progressing     Problem: Skin and Tissue Injury (Artificial Airway)  Goal: Absence of Device-Related Skin or Tissue Injury  Outcome: Ongoing, Progressing     Problem: Skin Injury Risk Increased  Goal: Skin Health and Integrity  Outcome: Ongoing, Progressing     Problem: Hemodynamic Instability (Hemodialysis)  Goal: Effective Tissue Perfusion  Outcome: Ongoing, Progressing     Problem: Diabetes Comorbidity  Goal: Blood Glucose Level Within Targeted Range  Outcome: Ongoing, Progressing     Problem: Impaired Wound Healing  Goal: Optimal Wound Healing  Outcome: Ongoing, Progressing     Problem: Gas Exchange Impaired  Goal: Optimal Gas Exchange  Outcome: Ongoing, Progressing

## 2022-05-11 NOTE — PLAN OF CARE
Problem: Adult Inpatient Plan of Care  Goal: Plan of Care Review  Outcome: Ongoing, Progressing  Goal: Patient-Specific Goal (Individualized)  Outcome: Ongoing, Progressing  Goal: Absence of Hospital-Acquired Illness or Injury  Outcome: Ongoing, Progressing  Goal: Optimal Comfort and Wellbeing  Outcome: Ongoing, Progressing     Problem: Communication Impairment (Mechanical Ventilation, Invasive)  Goal: Effective Communication  Outcome: Ongoing, Progressing     Problem: Device-Related Complication Risk (Mechanical Ventilation, Invasive)  Goal: Optimal Device Function  Outcome: Ongoing, Progressing     Problem: Inability to Wean (Mechanical Ventilation, Invasive)  Goal: Mechanical Ventilation Liberation  Outcome: Ongoing, Progressing     Problem: Nutrition Impairment (Mechanical Ventilation, Invasive)  Goal: Optimal Nutrition Delivery  Outcome: Ongoing, Progressing     Problem: Skin and Tissue Injury (Mechanical Ventilation, Invasive)  Goal: Absence of Device-Related Skin and Tissue Injury  Outcome: Ongoing, Progressing     Problem: Ventilator-Induced Lung Injury (Mechanical Ventilation, Invasive)  Goal: Absence of Ventilator-Induced Lung Injury  Outcome: Ongoing, Progressing     Problem: Communication Impairment (Artificial Airway)  Goal: Effective Communication  Outcome: Ongoing, Progressing     Problem: Device-Related Complication Risk (Artificial Airway)  Goal: Optimal Device Function  Outcome: Ongoing, Progressing     Problem: Skin and Tissue Injury (Artificial Airway)  Goal: Absence of Device-Related Skin or Tissue Injury  Outcome: Ongoing, Progressing     Problem: Gas Exchange Impaired  Goal: Optimal Gas Exchange  Outcome: Ongoing, Progressing      EXAM NOTE      HISTORY    Ely Mercado is a 51 year old female who presents for a laceration of the left 4th finger with use of serrated knife.  It happened approximately 1 hour prior to her appointment.  Patient came in holding a napkin over her finger..      MEDICAL HISTORY    Past Medical History:   Diagnosis Date   • Amblyopia    • Anemia    • Anesthesia complication     takes long to come out of Gen anesthetic   • Chalazion of right lower eyelid    • CHF (congestive heart failure) (CMS/HCC)    • Chronic fatigue    • Constipation    • DDD (degenerative disc disease), lumbar    • DUB (dysfunctional uterine bleeding)    • Encounter for IUD removal and reinsertion 03/27/2018    Sol Removal and Reinsertion    • Failed moderate sedation during procedure     took a long time to wake   • Fracture 1981    Left elbow   • GERD (gastroesophageal reflux disease)    • Hemorrhoids    • High cholesterol    • [place] (hard of hearing)    • Hyperlipidemia    • ICD (implantable cardioverter-defibrillator) in place     [employer]   • Insulin resistance syndrome     on metformin   • Left bundle branch block    • Non-sustained ventricular tachycardia (CMS/HCC)    • Nonischemic cardiomyopathy (CMS/HCC)     dilated (diagnosed in  07/2011)EF 22%        6/5/2019 EF 52%   • OA (osteoarthritis) of knee 10/16/2012    and R foot   • Obesity    • Pelvic pain in female    • Polycystic ovarian syndrome    • Seasonal allergies    • Sleep apnea     uses c-pap instructed to bring   • Spinal stenosis at L4-L5 level    • TMJ (dislocation of temporomandibular joint)    • Type 2 diabetes mellitus without complication, without long-term current use of insulin (CMS/HCC)    • Uterine fibroid    • Wears contact lenses    • Wears glasses        MEDICATIONS    Current Outpatient Medications   Medication Sig   • Omega-3 Fatty Acids (FISH OIL) 1000 MG capsule Take 1,000 mg by mouth daily.   • hydrOXYzine (ATARAX) 25 MG tablet Take 1 tablet by mouth 3 times  daily as needed for Itching.   • metFORMIN (GLUCOPHAGE) 500 MG tablet Take 1 tablet by mouth daily.   • spironolactone (ALDACTONE) 25 MG tablet Take 1 tablet by mouth daily.   • potassium CHLORIDE (KLOR-CON M) 20 MEQ nieves ER tablet Take 1 tablet by mouth daily.   • lidocaine (XYLOCAINE) 5 % ointment Apply topically as directed to the affected area(s) as needed for Pain.   • carvedilol (COREG) 25 MG tablet Take 1 tablet by mouth 2 times daily.   • blood glucose meter Test blood sugar 1 times daily as directed. Diagnosis: E 11.9. Meter: One Touch per patient insurance   • blood glucose (ONE TOUCH ULTRA TEST) test strip Test blood sugar 1 times daily as directed. Diagnosis: E11.9. Meter: one touch per insurance   • ONETOUCH DELICA LANCETS 33G Misc Test blood sugar 1 time daily. Dx E11.9   • Misc Natural Products (TURMERIC CURCUMIN) Cap    • gabapentin (NEURONTIN) 300 MG capsule Take 1 capsule by mouth daily.   • cannabidiol (CBD) oil Take 1,000 mg by mouth nightly. 1/2 dropper every night   • furosemide (LASIX) 40 MG tablet Take 0.5 tablets by mouth daily.   • b complex vitamins tablet Take 1 tablet by mouth daily.   • Cholecalciferol (VITAMIN D) 400 UNITS tablet Take 800 Units by mouth daily.   • lisinopril (PRINIVIL,ZESTRIL) 40 MG tablet Take 1 tablet by mouth 2 times daily. (Patient taking differently: Take 20 mg by mouth nightly. Patient taking daily, drop in BP, pt hasn't taken in a couple years. Patient states 1/2 pill nightly)   • polyethylene glycol (MIRALAX) powder Take 17 g by mouth daily.     No current facility-administered medications for this visit.        ALLERGIES    ALLERGIES:   Allergen Reactions   • Zinc SWELLING     (of throat)   • Neosporin [Neomycin-Bacitracin-Polymyxin] SWELLING   • Voltaren      Fingers and legs swell       REVIEW OF SYSTEMS      Constitutional:  Patient denies fever, chills, tiredness or malaise.    Eyes:  Denies change in visual acuity, pain, burning or itching.     Immunologic:  Denies hives, seasonal allergies.    HENT:  Denies sinus problems, ear infections, nasal congestion or sore throat.    Respiratory:  Denies cough, shortness of breath.    Cardiovascular:  Denies chest pain, edema.        PHYSICAL EXAM    Vital Signs:    Vitals:    04/21/20 1951   BP: 118/70   Pulse: 79   Resp: 20   Temp: 99 °F (37.2 °C)   TempSrc: Tympanic   SpO2: 96%   PainSc: 3-4   PainLoc: Finger     Constitutional:  Ely is a 51 year old  year old who is pleasant and in no acute distress.  Integument:  Skin is warm. Dry. No erythema. No rash.  1.5 cm laceration 4th finger above PIP   Cardiovascular:  Normal heart rate. Normal rhythm. No murmurs, rubs or gallops appreciated.  Respiratory:  Normal breath sounds throughtout the anterior and posterior lobes. No respiratory distress. No wheezing, rhonchi or rales.      ASSESSMENT/PLAN    Laceration of the 4th finger:  Finger was cleaned with wound clean, and Betadine applied.  Finger block applied to 4th left finger 5.0 sutures used, 3 sutures placed.    Medicated dressing placed.  Patient to come back in 5-7 days for suture removal.

## 2022-05-11 NOTE — ASSESSMENT & PLAN NOTE
BP is stable  Currently on rate control medications (cardizem 90 mg q6 and metoprolol 12.5 bid)  5/8 concern for aspiration overnight. BP now low normal  5/10 BP still on lower side

## 2022-05-11 NOTE — PROGRESS NOTES
[facility]  Pulmonology  Progress Note    Patient Name: Og Garcia  MRN: [id number]  Admission Date: 12/23/2021  Hospital Length of Stay: 139 days  Code Status: Prior  Attending Provider: Cecil Abernathy DO  Primary Care Provider: Hector Arndt DNP, FNP-C   Principal Problem: Denice gangrene    Subjective:     Interval History: No acute events overnight. The patient is afebrile this am. He is currently resting comfortably. Oxygenating adequately.     Objective:     Vital Signs (Most Recent):  Temp: 98.8 °F (37.1 °C) (05/11/22 1500)  Pulse: 107 (05/11/22 1701)  Resp: (!) 27 (05/11/22 1701)  BP: (!) 99/57 (05/11/22 1701)  SpO2: 100 % (05/11/22 1701)   Vital Signs (24h Range):  Temp:  [98.2 °F (36.8 °C)-100.4 °F (38 °C)] 98.8 °F (37.1 °C)  Pulse:  [] 107  Resp:  [7-100] 27  SpO2:  [10 %-100 %] 100 %  BP: ()/(37-59) 99/57     Weight: 73.4 kg (161 lb 13.1 oz)  Body mass index is 22.57 kg/m².      Intake/Output Summary (Last 24 hours) at 5/11/2022 1723  Last data filed at 5/11/2022 1700  Gross per 24 hour   Intake 4860 ml   Output 900 ml   Net 3960 ml         Physical Exam    Vents:  Vent Mode: CPAP PSV (05/11/22 1240)  Ventilator Initiated: No (04/23/22 1934)  Set Rate: 14 BPM (05/09/22 1536)  Vt Set: 480 mL (05/09/22 1536)  Pressure Support: 12 cmH20 (05/11/22 1240)  PEEP/CPAP: 5 cmH20 (05/11/22 1240)  Oxygen Concentration (%): 35 (05/11/22 1715)  Peak Airway Pressure: 21 cmH2O (05/11/22 0843)  Plateau Pressure: 20 cmH20 (03/07/22 0500)  Total Ve: 8.3 mL (05/11/22 1240)  F/VT Ratio<105 (RSBI): (!) 31.65 (05/11/22 0450)    Lines/Drains/Airways       Central Venous Catheter Line  Duration                  Hemodialysis Catheter 12/30/21 0900 right internal jugular 132 days              Drain  Duration                  Colostomy 12/18/21 1030 Descending/sigmoid  days         Gastrostomy/Enterostomy 03/09/22 1436 Percutaneous endoscopic gastrostomy (PEG) midline  feeding 63 days              Airway  Duration                  Surgical Airway 04/27/22 0856 [place];Other (Comment) Cuffed;Long 14 days              Peripheral Intravenous Line  Duration                  Peripheral IV - Single Lumen 04/11/22 1623 18 G Anterior;Proximal;Right Forearm 30 days                    Significant Labs:    CBC/Anemia Profile:  No results for input(s): WBC, HGB, HCT, PLT, MCV, RDW, IRON, FERRITIN, RETIC, FOLATE, [id number], OCCULTBLOOD in the last 48 hours.       Chemistries:  No results for input(s): NA, K, CL, CO2, BUN, CREATININE, CALCIUM, ALBUMIN, PROT, BILITOT, ALKPHOS, ALT, AST, GLUCOSE, MG, PHOS in the last 48 hours.      All pertinent labs within the past 24 hours have been reviewed.    Significant Imaging:  I have reviewed all pertinent imaging results/findings within the past 24 hours.    Assessment/Plan:     * Denice gangrene  Stable with no acute issues          Chronic respiratory failure  Was doing trach collar over the weekend but had to be placed back on the vent earlier this week   - will treat his pain- hopefully this will improve his HR and then start back on weaning trails   4/7- aspirated yesterday - febrile this morning. Will start Clindamycin today -   4/8- clinically looks better- will resume PSV today as tolerated   4/9- increased respiratory rate and tachycardic on exam.  Patient had just been turned.  This is all likely secondary to pain.  Will treat his pain and then increase his CPAP to 4 hours t.i.d. as tolerated.  4/13- to OR today  Then  -continue PSV as tolerated   4/26 - remains on continuous PSV and now doing well  4/27- once trach is changed out we can start some trach collar  Patient had a spell of tachycardia put back on the ventilator restart CPAP trials  05/01/2022 increase weaning trials  05/02/2022 at T-tube trials to continuous CPAP  05/04/2022 increase trach collars to 3 hours t.i.d. and continue continue with CPAP  05/06/2022 increase weaning  trials to 16 hours trach collar today  5/7- He is doing well with trach collar. Continue as tolerated  5/8- patient had some respiratory distress overnight after presumed aspiration. Chest xray with opacified right lung. Placed back on ventilator. I have weaned FiO2 from 80% to 60%  5/9- increased RR; oxygenating adequately. Continue on AC   5/10- Looks better this am. Oxygenating adequately. Back on PSV now and will look to start some trach collar back in the next day or two    Pressure ulcer of sacral region, unstageable  Wound care is following the patient  Their help reviewed and appreciated    Fever  He is now afebrile  Last WBC 13.42  5/8- patient is felt to have aspirated overnight. Started on zosyn  5/10- tmax 101 in last 24 hours - BC x 2 NGTD- add resp culture -chest xray reviewed -  5/11- afebrile this am. Cultures with no growth to date    ESRD (end stage renal disease)  Started on HD 12/30   Continue with HD per nephrology      Type 2 diabetes mellitus without complication, without long-term current use of insulin  Blood sugar is well controlled    Acute blood loss anemia  No active bleeding  Hgb 6.0 on 4/11  Patient transfused 1 unit with dialysis yesterday  4/13 -H/H 7.5/22.7  4/15- hgb is 6.2 today. We are going to give another unit of PRBC's today with dialysis  4/16 post transfusion cbc is pending  4/18- hgb is 6.6. Will plan for transfusion with 1 unit of prbc's with next HD  4/21 - 7.2/21.6   4/25- 6.6/19.7 - will transfuse one unit PRBCs with next HD   4/28- Hgb now 8.0 following transfusion  5/2/2022 hemoglobin 7 no need for transfusion yet  5/3/2022 transfuse 1 unit packed red blood cells  05/05/2022 I believe this anemia is probably related to his chronic renal sufficiency a wonder if he is a candidate for Epogen at this point  5/10- transfused 1 unit yesterday      Hypertension  BP is stable  Currently on rate control medications (cardizem 90 mg q6 and metoprolol 12.5 bid)  5/8 concern for  aspiration overnight. 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

## 2022-05-12 NOTE — PROCEDURES
"Og Garcia is a 67 y.o. male patient.    Temp: 100.1 °F (37.8 °C) (05/12/22 1200)  Pulse: 93 (05/12/22 1312)  Resp: (!) 29 (05/12/22 1312)  BP: (!) 115/51 (05/12/22 1226)  SpO2: 100 % (05/12/22 1312)  Weight: 78.4 kg (172 lb 13.5 oz) (05/12/22 0410)  Height: 5' 11" (180.3 cm) (05/09/22 0857)       Debridement    Date/Time: 5/12/2022 1:48 PM  Performed by: HERSON Viveros  Authorized by: HERSON Viveros   Associated wounds:        Altered Skin Integrity 02/08/22 1300 Left posterior Heel Other (comment) Full thickness tissue loss. Base is covered by slough and/or eschar in the wound bed       Altered Skin Integrity 03/22/22 1300 posterior Sacral spine Other (comment) Full thickness tissue loss. Base is covered by slough and/or eschar in the wound bed       Altered Skin Integrity 04/21/22 1200 Right medial Toe, fifth Ulceration  Time out: Immediately prior to procedure a "time out" was called to verify the correct patient, procedure, equipment, support staff and site/side marked as required.    Consent Done?:  Yes (Written)  Local anesthesia used?: No      Wound Details:    Location:  Sacrum       Length (cm):  15       Area (sq cm):  195       Width (cm):  13       Percent Debrided (%):  100       Depth (cm):  4       Total Area Debrided (sq cm):  195    Depth of debridement:  Bone    Tissue debrided:  Subcutaneous and Muscle    Devitalized tissue debrided:  Necrotic/Eschar, Exudate, Biofilm and Slough    Instruments:  Scissors    2nd Wound Details:     Location:  Left foot    Location:  Left Heel    Location:  Left Heel    Type of Debridement:  Excisional       Length (cm):  7       Area (sq cm):  49       Width (cm):  7       Percent Debrided (%):  100       Depth (cm):  0.5       Total Area Debrided (sq cm):  49    Depth of debridement:  Bone    Tissue debrided:  Bone, Subcutaneous and Muscle    Devitalized tissue debrided:  Biofilm, Clots, Exudate, Necrotic/Eschar and Slough    Instruments:  " Scissors    3rd Wound Details:     Location:  Right foot    Location:  Right 5th Toe    Location:  Right 5th Toe    Type of Debridement:  Excisional       Length (cm):  3       Area (sq cm):  15       Width (cm):  5       Percent Debrided (%):  100       Depth (cm):  1       Total Area Debrided (sq cm):  15    Depth of debridement:  Bone    Tissue debrided:  Bone, Muscle and Subcutaneous    Devitalized tissue debrided:  Clots, Exudate, Necrotic/Eschar and Slough    Instruments:  Scissors    Bleeding:  Minimal  Hemostasis Achieved: Yes    Method Used:  Pressure  Patient tolerance:  Patient tolerated the procedure well with no immediate complications        5/12/2022

## 2022-05-12 NOTE — PLAN OF CARE
Problem: Adult Inpatient Plan of Care  Goal: Plan of Care Review  Outcome: Ongoing, Progressing  Goal: Patient-Specific Goal (Individualized)  Outcome: Ongoing, Progressing  Goal: Optimal Comfort and Wellbeing  Outcome: Ongoing, Progressing     Problem: Adjustment to Illness (Sepsis/Septic Shock)  Goal: Optimal Coping  Outcome: Ongoing, Progressing     Problem: Bleeding (Sepsis/Septic Shock)  Goal: Absence of Bleeding  Outcome: Ongoing, Progressing     Problem: Glycemic Control Impaired (Sepsis/Septic Shock)  Goal: Blood Glucose Level Within Desired Range  Outcome: Ongoing, Progressing     Problem: Nutrition Impaired (Sepsis/Septic Shock)  Goal: Optimal Nutrition Intake  Outcome: Ongoing, Progressing     Problem: Fall Injury Risk  Goal: Absence of Fall and Fall-Related Injury  Outcome: Ongoing, Progressing     Problem: Communication Impairment (Mechanical Ventilation, Invasive)  Goal: Effective Communication  Outcome: Ongoing, Progressing     Problem: Nutrition Impairment (Mechanical Ventilation, Invasive)  Goal: Optimal Nutrition Delivery  Outcome: Ongoing, Progressing     Problem: Skin and Tissue Injury (Mechanical Ventilation, Invasive)  Goal: Absence of Device-Related Skin and Tissue Injury  Outcome: Ongoing, Progressing     Problem: Adult Inpatient Plan of Care  Goal: Absence of Hospital-Acquired Illness or Injury  Outcome: Ongoing, Not Progressing  Goal: Readiness for Transition of Care  Outcome: Ongoing, Not Progressing     Problem: Fluid and Electrolyte Imbalance (Acute Kidney Injury/Impairment)  Goal: Fluid and Electrolyte Balance  Outcome: Ongoing, Not Progressing     Problem: Infection  Goal: Absence of Infection Signs and Symptoms  Outcome: Ongoing, Not Progressing     Problem: Device-Related Complication Risk (Mechanical Ventilation, Invasive)  Goal: Optimal Device Function  Outcome: Ongoing, Not Progressing

## 2022-05-12 NOTE — PLAN OF CARE
SS spoke with pt's daughter about discharge planning. Informed of places that can accept pt with trach, vent and dialysis patients. Choice obtained for [facility].

## 2022-05-12 NOTE — PROGRESS NOTES
[facility]  Pulmonology  Progress Note    Patient Name: Og Garcia  MRN: [id number]  Admission Date: 12/23/2021  Hospital Length of Stay: 140 days  Code Status: Prior  Attending Provider: Cecil Abernathy DO  Primary Care Provider: Hector Arndt DNP, FNP-C   Principal Problem: Denice gangrene    Subjective:     Interval History: No acute events overnight. The patient is afebrile this am. He is currently resting comfortably. Oxygenating adequately. Back on CPAP/PSV and doing well.    Objective:     Vital Signs (Most Recent):  Temp: 99 °F (37.2 °C) (05/12/22 0800)  Pulse: 106 (05/12/22 0843)  Resp: 17 (05/12/22 0748)  BP: (!) 108/54 (MAP 72) (05/12/22 0843)  SpO2: 100 % (05/12/22 0748)   Vital Signs (24h Range):  Temp:  [98.6 °F (37 °C)-99.4 °F (37.4 °C)] 99 °F (37.2 °C)  Pulse:  [] 106  Resp:  [9-41] 17  SpO2:  [100 %] 100 %  BP: ()/(47-62) 108/54     Weight: 78.4 kg (172 lb 13.5 oz)  Body mass index is 24.11 kg/m².      Intake/Output Summary (Last 24 hours) at 5/12/2022 1105  Last data filed at 5/12/2022 0800  Gross per 24 hour   Intake 3810 ml   Output 3000 ml   Net 810 ml         Physical Exam    Vents:  Vent Mode: CPAP PSV (05/12/22 0821)  Ventilator Initiated: No (04/23/22 1934)  Set Rate: 0 BPM (05/12/22 0426)  Vt Set: 0 mL (05/12/22 0426)  Pressure Support: 15 cmH20 (05/12/22 0426)  PEEP/CPAP: 5 cmH20 (05/12/22 0426)  Oxygen Concentration (%): 35 (05/12/22 0748)  Peak Airway Pressure: 22 cmH2O (05/12/22 0821)  Plateau Pressure: 20 cmH20 (03/07/22 0500)  Total Ve: 15.2 mL (05/12/22 0821)  F/VT Ratio<105 (RSBI): (!) 37.25 (05/1955)    Lines/Drains/Airways       Central Venous Catheter Line  Duration                  Hemodialysis Catheter 12/30/21 0900 right internal jugular 133 days              Drain  Duration                  Colostomy 12/18/21 1030 Descending/sigmoid  days         Gastrostomy/Enterostomy 03/09/22 1436 Percutaneous endoscopic  gastrostomy (PEG) midline feeding 63 days              Airway  Duration                  Surgical Airway 04/27/22 0856 Marychuy;Other (Comment) Cuffed;Long 15 days              Peripheral Intravenous Line  Duration                  Peripheral IV - Single Lumen 04/11/22 1623 18 G Anterior;Proximal;Right Forearm 30 days                    Significant Labs:    CBC/Anemia Profile:  No results for input(s): WBC, HGB, HCT, PLT, MCV, RDW, IRON, FERRITIN, RETIC, FOLATE, JOVRUYPE10, OCCULTBLOOD in the last 48 hours.       Chemistries:  No results for input(s): NA, K, CL, CO2, BUN, CREATININE, CALCIUM, ALBUMIN, PROT, BILITOT, ALKPHOS, ALT, AST, GLUCOSE, MG, PHOS in the last 48 hours.      All pertinent labs within the past 24 hours have been reviewed.    Significant Imaging:  I have reviewed all pertinent imaging results/findings within the past 24 hours.    Assessment/Plan:     * Denice gangrene  Stable with no acute issues          Chronic respiratory failure  Was doing trach collar over the weekend but had to be placed back on the vent earlier this week   - will treat his pain- hopefully this will improve his HR and then start back on weaning trails   4/7- aspirated yesterday - febrile this morning. Will start Clindamycin today -   4/8- clinically looks better- will resume PSV today as tolerated   4/9- increased respiratory rate and tachycardic on exam.  Patient had just been turned.  This is all likely secondary to pain.  Will treat his pain and then increase his CPAP to 4 hours t.i.d. as tolerated.  4/13- to OR today  Then  -continue PSV as tolerated   4/26 - remains on continuous PSV and now doing well  4/27- once trach is changed out we can start some trach collar  Patient had a spell of tachycardia put back on the ventilator restart CPAP trials  05/01/2022 increase weaning trials  05/02/2022 at T-tube trials to continuous CPAP  05/04/2022 increase trach collars to 3 hours t.i.d. and continue continue with  CPAP  05/06/2022 increase weaning trials to 16 hours trach collar today  5/7- He is doing well with trach collar. Continue as tolerated  5/8- patient had some respiratory distress overnight after presumed aspiration. Chest xray with opacified right lung. Placed back on ventilator. I have weaned FiO2 from 80% to 60%  5/9- increased RR; oxygenating adequately. Continue on AC   5/10- Looks better this am. Oxygenating adequately. Back on PSV now and will look to start some trach collar back in the next day or two  5/12- Doing well on PSV. Can start some trach collar back    Pressure ulcer of sacral region, unstageable  Wound care is following the patient  Their help reviewed and appreciated    Fever  He is now afebrile  Last WBC 13.42  5/8- patient is felt to have aspirated overnight. Started on zosyn  5/10- tmax 101 in last 24 hours - BC x 2 NGTD- add resp culture -chest xray reviewed -  5/11- afebrile this am. Cultures with no growth to date  5/12- remains afebrile. Sputum culture with Pseudomonas sensitive to zosyn. Will continue for 7 days    ESRD (end stage renal disease)  Started on HD 12/30   Continue with HD per nephrology      Type 2 diabetes mellitus without complication, without long-term current use of insulin  Blood sugar is well controlled    Acute blood loss anemia  No active bleeding  Hgb 6.0 on 4/11  Patient transfused 1 unit with dialysis yesterday  4/13 -H/H 7.5/22.7  4/15- hgb is 6.2 today. We are going to give another unit of PRBC's today with dialysis  4/16 post transfusion cbc is pending  4/18- hgb is 6.6. Will plan for transfusion with 1 unit of prbc's with next HD  4/21 - 7.2/21.6   4/25- 6.6/19.7 - will transfuse one unit PRBCs with next HD   4/28- Hgb now 8.0 following transfusion  5/2/2022 hemoglobin 7 no need for transfusion yet  5/3/2022 transfuse 1 unit packed red blood cells  05/05/2022 I believe this anemia is probably related to his chronic renal sufficiency a wonder if he is a  candidate for Epogen at this point  5/10- transfused 1 unit yesterday  5/12 repeat H&H in am    Hypertension  BP is stable  Currently on rate control medications (cardizem 90 mg q6 and metoprolol 12.5 bid)  5/8 concern for aspiration overnight. 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

## 2022-05-12 NOTE — PLAN OF CARE
Problem: Communication Impairment (Mechanical Ventilation, Invasive)  Goal: Effective Communication  Outcome: Ongoing, Progressing     Problem: Ventilator-Induced Lung Injury (Mechanical Ventilation, Invasive)  Goal: Absence of Ventilator-Induced Lung Injury  Outcome: Ongoing, Progressing     Problem: Gas Exchange Impaired  Goal: Optimal Gas Exchange  Outcome: Ongoing, Progressing

## 2022-05-12 NOTE — ASSESSMENT & PLAN NOTE
BP is stable  Currently on rate control medications (cardizem 90 mg q6 and metoprolol 12.5 bid)  5/8 concern for aspiration overnight. BP now low normal  5/10 BP still on lower side  5/12 BP looks better today

## 2022-05-12 NOTE — ASSESSMENT & PLAN NOTE
He is now afebrile  Last WBC 13.42  5/8- patient is felt to have aspirated overnight. Started on zosyn  5/10- tmax 101 in last 24 hours - BC x 2 NGTD- add resp culture -chest xray reviewed -  5/11- afebrile this am. Cultures with no growth to date  5/12- remains afebrile. Sputum culture with Pseudomonas sensitive to zosyn. Will continue for 7 days

## 2022-05-12 NOTE — SUBJECTIVE & OBJECTIVE
Interval History: No acute events overnight. The patient is afebrile this am. He is currently resting comfortably. Oxygenating adequately. Back on CPAP/PSV and doing well.    Objective:     Vital Signs (Most Recent):  Temp: 99 °F (37.2 °C) (05/12/22 0800)  Pulse: 106 (05/12/22 0843)  Resp: 17 (05/12/22 0748)  BP: (!) 108/54 (MAP 72) (05/12/22 0843)  SpO2: 100 % (05/12/22 0748)   Vital Signs (24h Range):  Temp:  [98.6 °F (37 °C)-99.4 °F (37.4 °C)] 99 °F (37.2 °C)  Pulse:  [] 106  Resp:  [9-41] 17  SpO2:  [100 %] 100 %  BP: ()/(47-62) 108/54     Weight: 78.4 kg (172 lb 13.5 oz)  Body mass index is 24.11 kg/m².      Intake/Output Summary (Last 24 hours) at 5/12/2022 1105  Last data filed at 5/12/2022 0800  Gross per 24 hour   Intake 3810 ml   Output 3000 ml   Net 810 ml         Physical Exam    Vents:  Vent Mode: CPAP PSV (05/12/22 0821)  Ventilator Initiated: No (04/23/22 1934)  Set Rate: 0 BPM (05/12/22 0426)  Vt Set: 0 mL (05/12/22 0426)  Pressure Support: 15 cmH20 (05/12/22 0426)  PEEP/CPAP: 5 cmH20 (05/12/22 0426)  Oxygen Concentration (%): 35 (05/12/22 0748)  Peak Airway Pressure: 22 cmH2O (05/12/22 0821)  Plateau Pressure: 20 cmH20 (03/07/22 0500)  Total Ve: 15.2 mL (05/12/22 0821)  F/VT Ratio<105 (RSBI): (!) 37.25 (05/1955)    Lines/Drains/Airways       Central Venous Catheter Line  Duration                  Hemodialysis Catheter 12/30/21 0900 right internal jugular 133 days              Drain  Duration                  Colostomy 12/18/21 1030 Descending/sigmoid  days         Gastrostomy/Enterostomy 03/09/22 1436 Percutaneous endoscopic gastrostomy (PEG) midline feeding 63 days              Airway  Duration                  Surgical Airway 04/27/22 0856 Marychuy;Other (Comment) Cuffed;Long 15 days              Peripheral Intravenous Line  Duration                  Peripheral IV - Single Lumen 04/11/22 1623 18 G Anterior;Proximal;Right Forearm 30 days                    Significant  Labs:    CBC/Anemia Profile:  No results for input(s): WBC, HGB, HCT, PLT, MCV, RDW, IRON, FERRITIN, RETIC, FOLATE, [id number], OCCULTBLOOD in the last 48 hours.       Chemistries:  No results for input(s): NA, K, CL, CO2, BUN, CREATININE, CALCIUM, ALBUMIN, PROT, BILITOT, ALKPHOS, ALT, AST, GLUCOSE, MG, PHOS in the last 48 hours.      All pertinent labs within the past 24 hours have been reviewed.    Significant Imaging:  I have reviewed all pertinent imaging results/findings within the past 24 hours.

## 2022-05-12 NOTE — ASSESSMENT & PLAN NOTE
No active bleeding  Hgb 6.0 on 4/11  Patient transfused 1 unit with dialysis yesterday  4/13 -H/H 7.5/22.7  4/15- hgb is 6.2 today. We are going to give another unit of PRBC's today with dialysis  4/16 post transfusion cbc is pending  4/18- hgb is 6.6. Will plan for transfusion with 1 unit of prbc's with next HD  4/21 - 7.2/21.6   4/25- 6.6/19.7 - will transfuse one unit PRBCs with next HD   4/28- Hgb now 8.0 following transfusion  5/2/2022 hemoglobin 7 no need for transfusion yet  5/3/2022 transfuse 1 unit packed red blood cells  05/05/2022 I believe this anemia is probably related to his chronic renal sufficiency a wonder if he is a candidate for Epogen at this point  5/10- transfused 1 unit yesterday  5/12 repeat H&H in am

## 2022-05-12 NOTE — ASSESSMENT & PLAN NOTE
Was doing trach collar over the weekend but had to be placed back on the vent earlier this week   - will treat his pain- hopefully this will improve his HR and then start back on weaning trails   4/7- aspirated yesterday - febrile this morning. Will start Clindamycin today -   4/8- clinically looks better- will resume PSV today as tolerated   4/9- increased respiratory rate and tachycardic on exam.  Patient had just been turned.  This is all likely secondary to pain.  Will treat his pain and then increase his CPAP to 4 hours t.i.d. as tolerated.  4/13- to OR today  Then  -continue PSV as tolerated   4/26 - remains on continuous PSV and now doing well  4/27- once trach is changed out we can start some trach collar  Patient had a spell of tachycardia put back on the ventilator restart CPAP trials  05/01/2022 increase weaning trials  05/02/2022 at T-tube trials to continuous CPAP  05/04/2022 increase trach collars to 3 hours t.i.d. and continue continue with CPAP  05/06/2022 increase weaning trials to 16 hours trach collar today  5/7- He is doing well with trach collar. Continue as tolerated  5/8- patient had some respiratory distress overnight after presumed aspiration. Chest xray with opacified right lung. Placed back on ventilator. I have weaned FiO2 from 80% to 60%  5/9- increased RR; oxygenating adequately. Continue on AC   5/10- Looks better this am. Oxygenating adequately. Back on PSV now and will look to start some trach collar back in the next day or two  5/12- Doing well on PSV. Can start some trach collar back

## 2022-05-13 NOTE — ASSESSMENT & PLAN NOTE
BP is stable  Currently on rate control medications (cardizem 90 mg q6 and metoprolol 12.5 bid)  5/8 concern for aspiration overnight. BP now low normal  5/10 BP still on lower side  No change today

## 2022-05-13 NOTE — PROGRESS NOTES
[facility]  Pulmonology  Progress Note    Patient Name: Og Garcia  MRN: [id number]  Admission Date: 12/23/2021  Hospital Length of Stay: 141 days  Code Status: Prior  Attending Provider: Cecil Abernathy DO  Primary Care Provider: Hector Arndt DNP, FNP-C   Principal Problem: Denice gangrene    Subjective:     Interval History:  Patient awake alert    Objective:     Vital Signs (Most Recent):  Temp: 99.9 °F (37.7 °C) (05/13/22 0358)  Pulse: 101 (05/13/22 0358)  Resp: (!) 25 (05/13/22 0019)  BP: (!) 126/48 (05/13/22 0627)  SpO2: 100 % (05/13/22 0019)   Vital Signs (24h Range):  Temp:  [98.8 °F (37.1 °C)-100.1 °F (37.8 °C)] 99.9 °F (37.7 °C)  Pulse:  [] 101  Resp:  [8-31] 25  SpO2:  [100 %] 100 %  BP: ()/(46-61) 126/48     Weight: 79 kg (174 lb 2.6 oz)  Body mass index is 24.29 kg/m².      Intake/Output Summary (Last 24 hours) at 5/13/2022 0656  Last data filed at 5/12/2022 1800  Gross per 24 hour   Intake 1510 ml   Output 100 ml   Net 1410 ml       Physical Exam  Vitals reviewed.   Constitutional:       Appearance: Normal appearance.      Interventions: He is not intubated.  HENT:      Head: Normocephalic and atraumatic.      Nose: Nose normal.      Mouth/Throat:      Mouth: Mucous membranes are dry.      Pharynx: Oropharynx is clear.   Eyes:      Extraocular Movements: Extraocular movements intact.      Conjunctiva/sclera: Conjunctivae normal.      Pupils: Pupils are equal, round, and reactive to light.   Cardiovascular:      Rate and Rhythm: Normal rate.      Heart sounds: Normal heart sounds. No murmur heard.  Pulmonary:      Effort: Pulmonary effort is normal. He is not intubated.      Breath sounds: Normal breath sounds.   Abdominal:      General: Abdomen is flat. Bowel sounds are normal.      Palpations: Abdomen is soft.   Musculoskeletal:         General: Normal range of motion.      Cervical back: Normal range of motion and neck supple.      Right lower leg:  No edema.      Left lower leg: No edema.   Skin:     General: Skin is warm and dry.      Capillary Refill: Capillary refill takes less than 2 seconds.   Neurological:      General: No focal deficit present.      Mental Status: He is alert and oriented to person, place, and time.   Psychiatric:         Mood and Affect: Mood normal.         Behavior: Behavior normal.       Vents:  Vent Mode: CPAP/PSV (05/13/22 0357)  Ventilator Initiated: No (04/23/22 1934)  Set Rate: 0 BPM (05/13/22 0357)  Vt Set: 0 mL (05/13/22 0357)  Pressure Support: 15 cmH20 (05/13/22 0357)  PEEP/CPAP: 5 cmH20 (05/13/22 0357)  Oxygen Concentration (%): 35 (05/13/22 0357)  Peak Airway Pressure: 20 cmH2O (05/13/22 0357)  Plateau Pressure: 20 cmH20 (03/07/22 0500)  Total Ve: 11.3 mL (05/13/22 0357)  F/VT Ratio<105 (RSBI): (!) 46.99 (05/13/22 0019)    Lines/Drains/Airways       Central Venous Catheter Line  Duration                  Hemodialysis Catheter 12/30/21 0900 right internal jugular 133 days              Drain  Duration                  Colostomy 12/18/21 1030 Descending/sigmoid  days         Gastrostomy/Enterostomy 03/09/22 1436 Percutaneous endoscopic gastrostomy (PEG) midline feeding 64 days              Airway  Duration                  Surgical Airway 04/27/22 0856 Shiley;Other (Comment) Cuffed;Long 15 days              Peripheral Intravenous Line  Duration                  Peripheral IV - Single Lumen 04/11/22 1623 18 G Anterior;Proximal;Right Forearm 31 days                    Significant Labs:    CBC/Anemia Profile:  No results for input(s): WBC, HGB, HCT, PLT, MCV, RDW, IRON, FERRITIN, RETIC, FOLATE, [id number], OCCULTBLOOD in the last 48 hours.     Chemistries:  No results for input(s): NA, K, CL, CO2, BUN, CREATININE, CALCIUM, ALBUMIN, PROT, BILITOT, ALKPHOS, ALT, AST, GLUCOSE, MG, PHOS in the last 48 hours.    Recent Lab Results         05/13/22  0607   05/12/22  2341   05/12/22  1606   05/12/22  1128        POC Glucose  138   139   121   144               Significant Imaging:  I have reviewed all pertinent imaging results/findings within the past 24 hours.    Assessment/Plan:     * Denice gangrene  Stable with no acute issues          Chronic respiratory failure  Was doing trach collar over the weekend but had to be placed back on the vent earlier this week   - will treat his pain- hopefully this will improve his HR and then start back on weaning trails   4/7- aspirated yesterday - febrile this morning. Will start Clindamycin today -   4/8- clinically looks better- will resume PSV today as tolerated   4/9- increased respiratory rate and tachycardic on exam.  Patient had just been turned.  This is all likely secondary to pain.  Will treat his pain and then increase his CPAP to 4 hours t.i.d. as tolerated.  4/13- to OR today  Then  -continue PSV as tolerated   4/26 - remains on continuous PSV and now doing well  4/27- once trach is changed out we can start some trach collar  Patient had a spell of tachycardia put back on the ventilator restart CPAP trials  05/01/2022 increase weaning trials  05/02/2022 at T-tube trials to continuous CPAP  05/04/2022 increase trach collars to 3 hours t.i.d. and continue continue with CPAP  05/06/2022 increase weaning trials to 16 hours trach collar today  5/7- He is doing well with trach collar. Continue as tolerated  5/8- patient had some respiratory distress overnight after presumed aspiration. Chest xray with opacified right lung. Placed back on ventilator. I have weaned FiO2 from 80% to 60%  5/9- increased RR; oxygenating adequately. Continue on AC   5/10- Looks better this am. Oxygenating adequately. Back on PSV now and will look to start some trach collar back in the next day or two  5/13- Doing well on PSV. Can start some trach collar back    Hypertension  BP is stable  Currently on rate control medications (cardizem 90 mg q6 and metoprolol 12.5 bid)  5/8 concern for aspiration overnight. BP  now low normal  5/10 BP still on lower side  No change today      Pressure ulcer of sacral region, unstageable  Wound care is following the patient  Their help reviewed and appreciated    ESRD (end stage renal disease)  Started on HD 12/30   Continue with HD per nephrology      Type 2 diabetes mellitus without complication, without long-term current use of insulin  Blood sugar is well controlled    Acute blood loss anemia  No active bleeding  Hgb 6.0 on 4/11  Patient transfused 1 unit with dialysis yesterday  4/13 -H/H 7.5/22.7  4/15- hgb is 6.2 today. We are going to give another unit of PRBC's today with dialysis  4/16 post transfusion cbc is pending  4/18- hgb is 6.6. Will plan for transfusion with 1 unit of prbc's with next HD  4/21 - 7.2/21.6   4/25- 6.6/19.7 - will transfuse one unit PRBCs with next HD   4/28- Hgb now 8.0 following transfusion  5/2/2022 hemoglobin 7 no need for transfusion yet  5/3/2022 transfuse 1 unit packed red blood cells  05/05/2022 I believe this anemia is probably related to his chronic renal sufficiency a wonder if he is a candidate for Epogen at this point  5/10- transfused 1 unit yesterday  5/12 repeat H&H in am  05/13/2022 in 2 H&H pending    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

## 2022-05-13 NOTE — PLAN OF CARE
Problem: Adult Inpatient Plan of Care  Goal: Plan of Care Review  Outcome: Ongoing, Progressing  Goal: Patient-Specific Goal (Individualized)  Outcome: Ongoing, Progressing  Goal: Absence of Hospital-Acquired Illness or Injury  Outcome: Ongoing, Progressing  Goal: Optimal Comfort and Wellbeing  Outcome: Ongoing, Progressing  Goal: Readiness for Transition of Care  Outcome: Ongoing, Progressing     Problem: Bleeding (Sepsis/Septic Shock)  Goal: Absence of Bleeding  Outcome: Ongoing, Progressing     Problem: Infection Progression (Sepsis/Septic Shock)  Goal: Absence of Infection Signs and Symptoms  Outcome: Ongoing, Progressing     Problem: Fluid and Electrolyte Imbalance (Acute Kidney Injury/Impairment)  Goal: Fluid and Electrolyte Balance  Outcome: Ongoing, Progressing     Problem: Renal Function Impairment (Acute Kidney Injury/Impairment)  Goal: Effective Renal Function  Outcome: Ongoing, Progressing     Problem: Fall Injury Risk  Goal: Absence of Fall and Fall-Related Injury  Outcome: Ongoing, Progressing     Problem: Device-Related Complication Risk (Mechanical Ventilation, Invasive)  Goal: Optimal Device Function  Outcome: Ongoing, Progressing     Problem: Nutrition Impairment (Mechanical Ventilation, Invasive)  Goal: Optimal Nutrition Delivery  Outcome: Ongoing, Progressing     Problem: Ventilator-Induced Lung Injury (Mechanical Ventilation, Invasive)  Goal: Absence of Ventilator-Induced Lung Injury  Outcome: Ongoing, Progressing     Problem: Device-Related Complication Risk (Artificial Airway)  Goal: Optimal Device Function  Outcome: Ongoing, Progressing     Problem: Skin Injury Risk Increased  Goal: Skin Health and Integrity  Outcome: Ongoing, Progressing     Problem: Hemodynamic Instability (Hemodialysis)  Goal: Effective Tissue Perfusion  Outcome: Ongoing, Progressing     Problem: Diabetes Comorbidity  Goal: Blood Glucose Level Within Targeted Range  Outcome: Ongoing, Progressing     Problem: Gas Exchange  Impaired  Goal: Optimal Gas Exchange  Outcome: Ongoing, Progressing

## 2022-05-13 NOTE — PROGRESS NOTES
[facility]  Nephrology  Progress Note    Patient Name: Og Garcia  MRN: [id number]  Admission Date: 12/23/2021  Hospital Length of Stay: 141 days  Attending Provider: Cecil Abernathy DO   Primary Care Physician: Hector Arndt DNP, FNP-C  Principal Problem:Denice gangrene    Subjective:     HPI: The patient is known from the previous nephrology consult at [facility].  He is no at Specialty and continues to need dialysis support.      Interval History: The patient is resting.  He was able to tolerate dialysis today.  He is doing 3 hours or CPAP.    Review of patient's allergies indicates:  No Known Allergies  Current Facility-Administered Medications   Medication Frequency    0.9%  NaCl infusion (for blood administration) Q24H PRN    0.9%  NaCl infusion PRN    acetaminophen tablet 500 mg Q6H PRN    albuterol nebulizer solution 2.5 mg Q4H PRN    albuterol-ipratropium 2.5 mg-0.5 mg/3 mL nebulizer solution 3 mL Q6H    bisacodyL EC tablet 10 mg Daily PRN    dextrose 50% injection 12.5 g PRN    diltiaZEM tablet 90 mg Q6H    glucagon (human recombinant) injection 1 mg PRN    guaiFENesin 100 mg/5 ml syrup 200 mg Q6H    heparin (porcine) injection 4,000 Units PRN    heparin (porcine) injection 5,000 Units Q8H    insulin aspart U-100 injection 1-10 Units Q6H    insulin detemir U-100 injection 25 Units QHS    metoprolol tartrate (LOPRESSOR) split tablet 12.5 mg BID    ondansetron injection 8 mg Q6H PRN    pantoprazole suspension 40 mg Daily    piperacillin-tazobactam (ZOSYN) 4.5 g in dextrose 5 % in water (D5W) 5 % 100 mL IVPB (MB+) Q12H    predniSONE tablet 2.5 mg Daily    sevelamer carbonate pwpk 0.8 g TID WM    silver sulfADIAZINE 1% cream Daily PRN    simethicone chewable tablet 80 mg TID PRN    sodium chloride 0.9% bolus 250 mL PRN       Objective:     Vital Signs (Most Recent):  Temp: 98.9 °F (37.2 °C) (05/13/22 1225)  Pulse: 98 (05/13/22 1331)  Resp: 16 (05/13/22  1331)  BP: (!) 134/54 (05/13/22 1331)  SpO2: 100 % (05/13/22 1331)  O2 Device (Oxygen Therapy): ventilator (05/13/22 0745)   Vital Signs (24h Range):  Temp:  [98.8 °F (37.1 °C)-100.3 °F (37.9 °C)] 98.9 °F (37.2 °C)  Pulse:  [] 98  Resp:  [8-33] 16  SpO2:  [97 %-100 %] 100 %  BP: ()/(46-77) 134/54     Weight: 79 kg (174 lb 2.6 oz) (05/13/22 0600)  Body mass index is 24.29 kg/m².  Body surface area is 1.99 meters squared.    I/O last 3 completed shifts:  In: 2900 [P.O.:720; I.V.:20; NG/GT:2060; IV Piggyback:100]  Out: 1150 [Stool:1150]    Physical Exam  Vitals reviewed.   HENT:      Head: Normocephalic.      Mouth/Throat:      Mouth: Mucous membranes are moist.   Cardiovascular:      Rate and Rhythm: Regular rhythm.   Pulmonary:      Effort: Pulmonary effort is normal.      Comments: CPAP  Abdominal:      Palpations: Abdomen is soft.   Neurological:      Mental Status: He is alert.       Significant Labs:  BMP:   Recent Labs   Lab 05/13/22  0647   *   *   K 4.1   CL 93*   CO2 25   BUN 78*   CREATININE 2.46*   CALCIUM 8.8     CBC:   Recent Labs   Lab 05/13/22  0539   WBC 16.56*   RBC 2.52*   HGB 7.8*   HCT 24.1*   *   MCV 95.6   MCH 31.0   MCHC 32.4        Significant Imaging:  Labs: Reviewed    Assessment/Plan:     ESRD (end stage renal disease)  Dialysis MWF  5/3/2022 Continue with scheduled hemodialysis for this patient.  5/5/2022  Continue with dialysis  5/6/2022Dialysis today.  5/9/2022  Continue with dialysis as scheduled.  5/10/2022 Dialysis as scheduled.  5/11/2022 Continue with current therapy.  5/13/2022  Dialysis sessions are going well.    Type 2 diabetes mellitus without complication, without long-term current use of insulin  Chronic condition  Continue with current therapy  5/13/2022  Continue with current therapy.        Thank you for your consult. I will follow-up with patient. Please contact us if you have any additional questions.    Edwin Pryor Jr, MD  Nephrology  [place] [facility]

## 2022-05-13 NOTE — ASSESSMENT & PLAN NOTE
Dialysis MWF  5/3/2022 Continue with scheduled hemodialysis for this patient.  5/5/2022  Continue with dialysis  5/6/2022Dialysis today.  5/9/2022  Continue with dialysis as scheduled.  5/10/2022 Dialysis as scheduled.  5/11/2022 Continue with current therapy.  5/13/2022  Dialysis sessions are going well.

## 2022-05-13 NOTE — ASSESSMENT & PLAN NOTE
No active bleeding  Hgb 6.0 on 4/11  Patient transfused 1 unit with dialysis yesterday  4/13 -H/H 7.5/22.7  4/15- hgb is 6.2 today. We are going to give another unit of PRBC's today with dialysis  4/16 post transfusion cbc is pending  4/18- hgb is 6.6. Will plan for transfusion with 1 unit of prbc's with next HD  4/21 - 7.2/21.6   4/25- 6.6/19.7 - will transfuse one unit PRBCs with next HD   4/28- Hgb now 8.0 following transfusion  5/2/2022 hemoglobin 7 no need for transfusion yet  5/3/2022 transfuse 1 unit packed red blood cells  05/05/2022 I believe this anemia is probably related to his chronic renal sufficiency a wonder if he is a candidate for Epogen at this point  5/10- transfused 1 unit yesterday  5/12 repeat H&H in am  05/13/2022 in 2 H&H pending

## 2022-05-13 NOTE — SUBJECTIVE & OBJECTIVE
Interval History:  Patient awake alert    Objective:     Vital Signs (Most Recent):  Temp: 99.9 °F (37.7 °C) (05/13/22 0358)  Pulse: 101 (05/13/22 0358)  Resp: (!) 25 (05/13/22 0019)  BP: (!) 126/48 (05/13/22 0627)  SpO2: 100 % (05/13/22 0019)   Vital Signs (24h Range):  Temp:  [98.8 °F (37.1 °C)-100.1 °F (37.8 °C)] 99.9 °F (37.7 °C)  Pulse:  [] 101  Resp:  [8-31] 25  SpO2:  [100 %] 100 %  BP: ()/(46-61) 126/48     Weight: 79 kg (174 lb 2.6 oz)  Body mass index is 24.29 kg/m².      Intake/Output Summary (Last 24 hours) at 5/13/2022 0656  Last data filed at 5/12/2022 1800  Gross per 24 hour   Intake 1510 ml   Output 100 ml   Net 1410 ml       Physical Exam  Vitals reviewed.   Constitutional:       Appearance: Normal appearance.      Interventions: He is not intubated.  HENT:      Head: Normocephalic and atraumatic.      Nose: Nose normal.      Mouth/Throat:      Mouth: Mucous membranes are dry.      Pharynx: Oropharynx is clear.   Eyes:      Extraocular Movements: Extraocular movements intact.      Conjunctiva/sclera: Conjunctivae normal.      Pupils: Pupils are equal, round, and reactive to light.   Cardiovascular:      Rate and Rhythm: Normal rate.      Heart sounds: Normal heart sounds. No murmur heard.  Pulmonary:      Effort: Pulmonary effort is normal. He is not intubated.      Breath sounds: Normal breath sounds.   Abdominal:      General: Abdomen is flat. Bowel sounds are normal.      Palpations: Abdomen is soft.   Musculoskeletal:         General: Normal range of motion.      Cervical back: Normal range of motion and neck supple.      Right lower leg: No edema.      Left lower leg: No edema.   Skin:     General: Skin is warm and dry.      Capillary Refill: Capillary refill takes less than 2 seconds.   Neurological:      General: No focal deficit present.      Mental Status: He is alert and oriented to person, place, and time.   Psychiatric:         Mood and Affect: Mood normal.         Behavior:  Behavior normal.       Vents:  Vent Mode: CPAP/PSV (05/13/22 0357)  Ventilator Initiated: No (04/23/22 1934)  Set Rate: 0 BPM (05/13/22 0357)  Vt Set: 0 mL (05/13/22 0357)  Pressure Support: 15 cmH20 (05/13/22 0357)  PEEP/CPAP: 5 cmH20 (05/13/22 0357)  Oxygen Concentration (%): 35 (05/13/22 0357)  Peak Airway Pressure: 20 cmH2O (05/13/22 0357)  Plateau Pressure: 20 cmH20 (03/07/22 0500)  Total Ve: 11.3 mL (05/13/22 0357)  F/VT Ratio<105 (RSBI): (!) 46.99 (05/13/22 0019)    Lines/Drains/Airways       Central Venous Catheter Line  Duration                  Hemodialysis Catheter 12/30/21 0900 right internal jugular 133 days              Drain  Duration                  Colostomy 12/18/21 1030 Descending/sigmoid  days         Gastrostomy/Enterostomy 03/09/22 1436 Percutaneous endoscopic gastrostomy (PEG) midline feeding 64 days              Airway  Duration                  Surgical Airway 04/27/22 0856 Shiley;Other (Comment) Cuffed;Long 15 days              Peripheral Intravenous Line  Duration                  Peripheral IV - Single Lumen 04/11/22 1623 18 G Anterior;Proximal;Right Forearm 31 days                    Significant Labs:    CBC/Anemia Profile:  No results for input(s): WBC, HGB, HCT, PLT, MCV, RDW, IRON, FERRITIN, RETIC, FOLATE, [id number], OCCULTBLOOD in the last 48 hours.     Chemistries:  No results for input(s): NA, K, CL, CO2, BUN, CREATININE, CALCIUM, ALBUMIN, PROT, BILITOT, ALKPHOS, ALT, AST, GLUCOSE, MG, PHOS in the last 48 hours.    Recent Lab Results         05/13/22  0607   05/12/22  2341   05/12/22  1606   05/12/22  1128        POC Glucose 138   139   121   144               Significant Imaging:  I have reviewed all pertinent imaging results/findings within the past 24 hours.

## 2022-05-13 NOTE — SUBJECTIVE & OBJECTIVE
Interval History: The patient is resting.  He was able to tolerate dialysis today.  He is doing 3 hours or CPAP.    Review of patient's allergies indicates:  No Known Allergies  Current Facility-Administered Medications   Medication Frequency    0.9%  NaCl infusion (for blood administration) Q24H PRN    0.9%  NaCl infusion PRN    acetaminophen tablet 500 mg Q6H PRN    albuterol nebulizer solution 2.5 mg Q4H PRN    albuterol-ipratropium 2.5 mg-0.5 mg/3 mL nebulizer solution 3 mL Q6H    bisacodyL EC tablet 10 mg Daily PRN    dextrose 50% injection 12.5 g PRN    diltiaZEM tablet 90 mg Q6H    glucagon (human recombinant) injection 1 mg PRN    guaiFENesin 100 mg/5 ml syrup 200 mg Q6H    heparin (porcine) injection 4,000 Units PRN    heparin (porcine) injection 5,000 Units Q8H    insulin aspart U-100 injection 1-10 Units Q6H    insulin detemir U-100 injection 25 Units QHS    metoprolol tartrate (LOPRESSOR) split tablet 12.5 mg BID    ondansetron injection 8 mg Q6H PRN    pantoprazole suspension 40 mg Daily    piperacillin-tazobactam (ZOSYN) 4.5 g in dextrose 5 % in water (D5W) 5 % 100 mL IVPB (MB+) Q12H    predniSONE tablet 2.5 mg Daily    sevelamer carbonate pwpk 0.8 g TID WM    silver sulfADIAZINE 1% cream Daily PRN    simethicone chewable tablet 80 mg TID PRN    sodium chloride 0.9% bolus 250 mL PRN       Objective:     Vital Signs (Most Recent):  Temp: 98.9 °F (37.2 °C) (05/13/22 1225)  Pulse: 98 (05/13/22 1331)  Resp: 16 (05/13/22 1331)  BP: (!) 134/54 (05/13/22 1331)  SpO2: 100 % (05/13/22 1331)  O2 Device (Oxygen Therapy): ventilator (05/13/22 0745)   Vital Signs (24h Range):  Temp:  [98.8 °F (37.1 °C)-100.3 °F (37.9 °C)] 98.9 °F (37.2 °C)  Pulse:  [] 98  Resp:  [8-33] 16  SpO2:  [97 %-100 %] 100 %  BP: ()/(46-77) 134/54     Weight: 79 kg (174 lb 2.6 oz) (05/13/22 0600)  Body mass index is 24.29 kg/m².  Body surface area is 1.99 meters squared.    I/O last 3 completed shifts:  In: 2900 [P.O.:720;  I.V.:20; NG/GT:2060; IV Piggyback:100]  Out: 1150 [Stool:1150]    Physical Exam  Vitals reviewed.   HENT:      Head: Normocephalic.      Mouth/Throat:      Mouth: Mucous membranes are moist.   Cardiovascular:      Rate and Rhythm: Regular rhythm.   Pulmonary:      Effort: Pulmonary effort is normal.      Comments: CPAP  Abdominal:      Palpations: Abdomen is soft.   Neurological:      Mental Status: He is alert.       Significant Labs:  BMP:   Recent Labs   Lab 05/13/22  0647   *   *   K 4.1   CL 93*   CO2 25   BUN 78*   CREATININE 2.46*   CALCIUM 8.8     CBC:   Recent Labs   Lab 05/13/22  0539   WBC 16.56*   RBC 2.52*   HGB 7.8*   HCT 24.1*   *   MCV 95.6   MCH 31.0   MCHC 32.4        Significant Imaging:  Labs: Reviewed

## 2022-05-13 NOTE — PLAN OF CARE
Problem: Adult Inpatient Plan of Care  Goal: Plan of Care Review  Outcome: Ongoing, Progressing  Goal: Patient-Specific Goal (Individualized)  Outcome: Ongoing, Progressing  Goal: Optimal Comfort and Wellbeing  Outcome: Ongoing, Progressing     Problem: Adjustment to Illness (Sepsis/Septic Shock)  Goal: Optimal Coping  Outcome: Ongoing, Progressing     Problem: Bleeding (Sepsis/Septic Shock)  Goal: Absence of Bleeding  Outcome: Ongoing, Progressing     Problem: Glycemic Control Impaired (Sepsis/Septic Shock)  Goal: Blood Glucose Level Within Desired Range  Outcome: Ongoing, Progressing     Problem: Nutrition Impaired (Sepsis/Septic Shock)  Goal: Optimal Nutrition Intake  Outcome: Ongoing, Progressing     Problem: Fluid and Electrolyte Imbalance (Acute Kidney Injury/Impairment)  Goal: Fluid and Electrolyte Balance  Outcome: Ongoing, Progressing     Problem: Oral Intake Inadequate (Acute Kidney Injury/Impairment)  Goal: Optimal Nutrition Intake  Outcome: Ongoing, Progressing     Problem: Fall Injury Risk  Goal: Absence of Fall and Fall-Related Injury  Outcome: Ongoing, Progressing     Problem: Communication Impairment (Mechanical Ventilation, Invasive)  Goal: Effective Communication  Outcome: Ongoing, Progressing     Problem: Adult Inpatient Plan of Care  Goal: Absence of Hospital-Acquired Illness or Injury  Outcome: Ongoing, Not Progressing  Goal: Readiness for Transition of Care  Outcome: Ongoing, Not Progressing     Problem: Infection Progression (Sepsis/Septic Shock)  Goal: Absence of Infection Signs and Symptoms  Outcome: Ongoing, Not Progressing     Problem: Renal Function Impairment (Acute Kidney Injury/Impairment)  Goal: Effective Renal Function  Outcome: Ongoing, Not Progressing

## 2022-05-13 NOTE — DIALYSIS ROUNDING
Mr. Garcia is seen during his hemodialysis. He is arousable but minimally interactive. BP stable during today's dialysis.

## 2022-05-13 NOTE — ASSESSMENT & PLAN NOTE
Was doing trach collar over the weekend but had to be placed back on the vent earlier this week   - will treat his pain- hopefully this will improve his HR and then start back on weaning trails   4/7- aspirated yesterday - febrile this morning. Will start Clindamycin today -   4/8- clinically looks better- will resume PSV today as tolerated   4/9- increased respiratory rate and tachycardic on exam.  Patient had just been turned.  This is all likely secondary to pain.  Will treat his pain and then increase his CPAP to 4 hours t.i.d. as tolerated.  4/13- to OR today  Then  -continue PSV as tolerated   4/26 - remains on continuous PSV and now doing well  4/27- once trach is changed out we can start some trach collar  Patient had a spell of tachycardia put back on the ventilator restart CPAP trials  05/01/2022 increase weaning trials  05/02/2022 at T-tube trials to continuous CPAP  05/04/2022 increase trach collars to 3 hours t.i.d. and continue continue with CPAP  05/06/2022 increase weaning trials to 16 hours trach collar today  5/7- He is doing well with trach collar. Continue as tolerated  5/8- patient had some respiratory distress overnight after presumed aspiration. Chest xray with opacified right lung. Placed back on ventilator. I have weaned FiO2 from 80% to 60%  5/9- increased RR; oxygenating adequately. Continue on AC   5/10- Looks better this am. Oxygenating adequately. Back on PSV now and will look to start some trach collar back in the next day or two  5/13- Doing well on PSV. Can start some trach collar back

## 2022-05-14 NOTE — PROGRESS NOTES
Placed patient back on the vent via ACV with previous settings for rest.  I tried to put patient back on CPAP 5, PSV 15, FIO2 35% and patient's respiratory rate increased to 42 and WOB was increased.  Placed patient back on ACV with previous settings and patient's respiration decreased to 24 and patient's WOB decreased.

## 2022-05-14 NOTE — SUBJECTIVE & OBJECTIVE
Interval History: Doing well, on AC this am. Tolerating trach collar trials per reports    Objective:     Vital Signs (Most Recent):  Temp: 99.6 °F (37.6 °C) (05/14/22 0722)  Pulse: (!) 114 (05/14/22 0800)  Resp: (!) 22 (05/14/22 0800)  BP: (!) 140/59 (05/14/22 0600)  SpO2: 100 % (05/14/22 0800)   Vital Signs (24h Range):  Temp:  [98.6 °F (37 °C)-99.8 °F (37.7 °C)] 99.6 °F (37.6 °C)  Pulse:  [] 114  Resp:  [7-44] 22  SpO2:  [94 %-100 %] 100 %  BP: (100-171)/(45-77) 140/59     Weight: 76.4 kg (168 lb 6.9 oz)  Body mass index is 23.49 kg/m².      Intake/Output Summary (Last 24 hours) at 5/14/2022 0817  Last data filed at 5/14/2022 0622  Gross per 24 hour   Intake 2819.58 ml   Output 1900 ml   Net 919.58 ml       Physical Exam  Vitals reviewed.   Constitutional:       General: He is not in acute distress.     Appearance: He is ill-appearing.      Comments: Chronically ill   HENT:      Head:      Comments: Right temporal deformity     Nose: Nose normal.      Mouth/Throat:      Mouth: Mucous membranes are moist.   Eyes:      Comments: enucleated right eye   Neck:      Comments: Tracheostomy in place  Cardiovascular:      Rate and Rhythm: Normal rate and regular rhythm.      Pulses: Normal pulses.      Heart sounds: Normal heart sounds.   Pulmonary:      Effort: No respiratory distress.      Breath sounds: No stridor. No wheezing, rhonchi or rales.      Comments: Coarse sounds bilaterally  Abdominal:      Palpations: Abdomen is soft.      Tenderness: There is no guarding.      Comments: PEG   Musculoskeletal:         General: No swelling or deformity.      Cervical back: Neck supple.      Right lower leg: No edema.      Left lower leg: No edema.      Comments: Thenar wasting.    Lymphadenopathy:      Cervical: No cervical adenopathy.   Skin:     General: Skin is warm and dry.   Neurological:      Mental Status: He is alert.      Cranial Nerves: No cranial nerve deficit.      Motor: Weakness present.        Vents:  Vent Mode: A/C (Placed patient back on the vent via ACV with previous settings for rest.) (05/14/22 0434)  Ventilator Initiated: No (04/23/22 1934)  Set Rate: 14 BPM (05/14/22 0434)  Vt Set: 480 mL (05/14/22 0434)  Pressure Support: 15 cmH20 (05/14/22 0424)  PEEP/CPAP: 5 cmH20 (05/14/22 0434)  Oxygen Concentration (%): 35 (05/14/22 0434)  Peak Airway Pressure: 18 cmH2O (05/14/22 0434)  Plateau Pressure: 20 cmH20 (03/07/22 0500)  Total Ve: 12.8 mL (05/14/22 0434)  F/VT Ratio<105 (RSBI): (!) 45.83 (05/14/22 0434)    Lines/Drains/Airways       Central Venous Catheter Line  Duration                  Hemodialysis Catheter 12/30/21 0900 right internal jugular 134 days              Drain  Duration                  Colostomy 12/18/21 1030 Descending/sigmoid  days         Gastrostomy/Enterostomy 03/09/22 1436 Percutaneous endoscopic gastrostomy (PEG) midline feeding 65 days              Airway  Duration                  Surgical Airway 04/27/22 0856 Shiley;Other (Comment) Cuffed;Long 16 days              Peripheral Intravenous Line  Duration                  Peripheral IV - Single Lumen 04/11/22 1623 18 G Anterior;Proximal;Right Forearm 32 days                    Significant Labs:    CBC/Anemia Profile:  Recent Labs   Lab 05/13/22  0539   WBC 16.56*   HGB 7.8*   HCT 24.1*   *   MCV 95.6   RDW 15.1*        Chemistries:  Recent Labs   Lab 05/13/22  0647   *   K 4.1   CL 93*   CO2 25   BUN 78*   CREATININE 2.46*   CALCIUM 8.8       All pertinent labs within the past 24 hours have been reviewed.    Significant Imaging:  I have reviewed all pertinent imaging results/findings within the past 24 hours.

## 2022-05-14 NOTE — PLAN OF CARE
Problem: Adult Inpatient Plan of Care  Goal: Plan of Care Review  Outcome: Ongoing, Progressing  Goal: Absence of Hospital-Acquired Illness or Injury  Outcome: Ongoing, Progressing  Goal: Optimal Comfort and Wellbeing  Outcome: Ongoing, Progressing     Problem: Adjustment to Illness (Sepsis/Septic Shock)  Goal: Optimal Coping  Outcome: Ongoing, Progressing     Problem: Nutrition Impaired (Sepsis/Septic Shock)  Goal: Optimal Nutrition Intake  Outcome: Ongoing, Progressing     Problem: Fluid and Electrolyte Imbalance (Acute Kidney Injury/Impairment)  Goal: Fluid and Electrolyte Balance  Outcome: Ongoing, Progressing     Problem: Oral Intake Inadequate (Acute Kidney Injury/Impairment)  Goal: Optimal Nutrition Intake  Outcome: Ongoing, Progressing     Problem: Fall Injury Risk  Goal: Absence of Fall and Fall-Related Injury  Outcome: Ongoing, Progressing     Problem: Inability to Wean (Mechanical Ventilation, Invasive)  Goal: Mechanical Ventilation Liberation  Outcome: Ongoing, Progressing     Problem: Skin and Tissue Injury (Mechanical Ventilation, Invasive)  Goal: Absence of Device-Related Skin and Tissue Injury  Outcome: Ongoing, Progressing     Problem: Device-Related Complication Risk (Artificial Airway)  Goal: Optimal Device Function  Outcome: Ongoing, Progressing     Problem: Skin and Tissue Injury (Artificial Airway)  Goal: Absence of Device-Related Skin or Tissue Injury  Outcome: Ongoing, Progressing     Problem: Skin Injury Risk Increased  Goal: Skin Health and Integrity  Outcome: Ongoing, Progressing     Problem: Infection (Hemodialysis)  Goal: Absence of Infection Signs and Symptoms  Outcome: Ongoing, Progressing     Problem: Diabetes Comorbidity  Goal: Blood Glucose Level Within Targeted Range  Outcome: Ongoing, Progressing     Problem: Adult Inpatient Plan of Care  Goal: Readiness for Transition of Care  Outcome: Ongoing, Not Progressing     Problem: Infection Progression (Sepsis/Septic Shock)  Goal:  Absence of Infection Signs and Symptoms  Outcome: Ongoing, Not Progressing     Problem: Renal Function Impairment (Acute Kidney Injury/Impairment)  Goal: Effective Renal Function  Outcome: Ongoing, Not Progressing     Problem: Infection  Goal: Absence of Infection Signs and Symptoms  Outcome: Ongoing, Not Progressing     Problem: Impaired Wound Healing  Goal: Optimal Wound Healing  Outcome: Ongoing, Not Progressing     Problem: Adult Inpatient Plan of Care  Goal: Readiness for Transition of Care  Outcome: Ongoing, Not Progressing     Problem: Infection Progression (Sepsis/Septic Shock)  Goal: Absence of Infection Signs and Symptoms  Outcome: Ongoing, Not Progressing     Problem: Renal Function Impairment (Acute Kidney Injury/Impairment)  Goal: Effective Renal Function  Outcome: Ongoing, Not Progressing     Problem: Infection  Goal: Absence of Infection Signs and Symptoms  Outcome: Ongoing, Not Progressing     Problem: Impaired Wound Healing  Goal: Optimal Wound Healing  Outcome: Ongoing, Not Progressing

## 2022-05-14 NOTE — PROGRESS NOTES
Placed patient back on the vent via CPAP with previous settings for rest.  Patient did 3 hours of Trach Collar trials 35% and did fine with no complications.

## 2022-05-14 NOTE — ASSESSMENT & PLAN NOTE
- previously on trach collar, regressed  - doing well with trials  - will try for trach collar during daytime, PSV for rest overnight

## 2022-05-14 NOTE — ASSESSMENT & PLAN NOTE
- febrile with leukocytosis  - on 5/8 patient is felt to have aspirated overnight. Started on zosyn  - Sputum culture with Pseudomonas sensitive to zosyn. Will continue for 7 days

## 2022-05-14 NOTE — NURSING
0100 dressing changed and treatment done to sacral old  reddish drainage noted on dressing keaton well will monitor for bleeding NADN

## 2022-05-14 NOTE — PROGRESS NOTES
[facility]  Pulmonology  Progress Note    Patient Name: Og Garcia  MRN: [id number]  Admission Date: 12/23/2021  Hospital Length of Stay: 142 days  Code Status: Prior  Attending Provider: Cecil Abernathy DO  Primary Care Provider: Hector Arndt DNP, FNP-C   Principal Problem: Denice gangrene    Subjective:     Interval History: Doing well, on AC this am. Tolerating trach collar trials per reports    Objective:     Vital Signs (Most Recent):  Temp: 99.6 °F (37.6 °C) (05/14/22 0722)  Pulse: (!) 114 (05/14/22 0800)  Resp: (!) 22 (05/14/22 0800)  BP: (!) 140/59 (05/14/22 0600)  SpO2: 100 % (05/14/22 0800)   Vital Signs (24h Range):  Temp:  [98.6 °F (37 °C)-99.8 °F (37.7 °C)] 99.6 °F (37.6 °C)  Pulse:  [] 114  Resp:  [7-44] 22  SpO2:  [94 %-100 %] 100 %  BP: (100-171)/(45-77) 140/59     Weight: 76.4 kg (168 lb 6.9 oz)  Body mass index is 23.49 kg/m².      Intake/Output Summary (Last 24 hours) at 5/14/2022 0817  Last data filed at 5/14/2022 0622  Gross per 24 hour   Intake 2819.58 ml   Output 1900 ml   Net 919.58 ml       Physical Exam  Vitals reviewed.   Constitutional:       General: He is not in acute distress.     Appearance: He is ill-appearing.      Comments: Chronically ill   HENT:      Head:      Comments: Right temporal deformity     Nose: Nose normal.      Mouth/Throat:      Mouth: Mucous membranes are moist.   Eyes:      Comments: enucleated right eye   Neck:      Comments: Tracheostomy in place  Cardiovascular:      Rate and Rhythm: Normal rate and regular rhythm.      Pulses: Normal pulses.      Heart sounds: Normal heart sounds.   Pulmonary:      Effort: No respiratory distress.      Breath sounds: No stridor. No wheezing, rhonchi or rales.      Comments: Coarse sounds bilaterally  Abdominal:      Palpations: Abdomen is soft.      Tenderness: There is no guarding.      Comments: PEG   Musculoskeletal:         General: No swelling or deformity.      Cervical back:  Neck supple.      Right lower leg: No edema.      Left lower leg: No edema.      Comments: Thenar wasting.    Lymphadenopathy:      Cervical: No cervical adenopathy.   Skin:     General: Skin is warm and dry.   Neurological:      Mental Status: He is alert.      Cranial Nerves: No cranial nerve deficit.      Motor: Weakness present.       Vents:  Vent Mode: A/C (Placed patient back on the vent via ACV with previous settings for rest.) (05/14/22 0434)  Ventilator Initiated: No (04/23/22 1934)  Set Rate: 14 BPM (05/14/22 0434)  Vt Set: 480 mL (05/14/22 0434)  Pressure Support: 15 cmH20 (05/14/22 0424)  PEEP/CPAP: 5 cmH20 (05/14/22 0434)  Oxygen Concentration (%): 35 (05/14/22 0434)  Peak Airway Pressure: 18 cmH2O (05/14/22 0434)  Plateau Pressure: 20 cmH20 (03/07/22 0500)  Total Ve: 12.8 mL (05/14/22 0434)  F/VT Ratio<105 (RSBI): (!) 45.83 (05/14/22 0434)    Lines/Drains/Airways       Central Venous Catheter Line  Duration                  Hemodialysis Catheter 12/30/21 0900 right internal jugular 134 days              Drain  Duration                  Colostomy 12/18/21 1030 Descending/sigmoid  days         Gastrostomy/Enterostomy 03/09/22 1436 Percutaneous endoscopic gastrostomy (PEG) midline feeding 65 days              Airway  Duration                  Surgical Airway 04/27/22 0856 Shiley;Other (Comment) Cuffed;Long 16 days              Peripheral Intravenous Line  Duration                  Peripheral IV - Single Lumen 04/11/22 1623 18 G Anterior;Proximal;Right Forearm 32 days                    Significant Labs:    CBC/Anemia Profile:  Recent Labs   Lab 05/13/22  0539   WBC 16.56*   HGB 7.8*   HCT 24.1*   *   MCV 95.6   RDW 15.1*        Chemistries:  Recent Labs   Lab 05/13/22  0647   *   K 4.1   CL 93*   CO2 25   BUN 78*   CREATININE 2.46*   CALCIUM 8.8       All pertinent labs within the past 24 hours have been reviewed.    Significant Imaging:  I have reviewed all pertinent imaging  results/findings within the past 24 hours.    Assessment/Plan:     * Denice gangrene  Stable with no acute issues          Chronic respiratory failure  - previously on trach collar, regressed  - doing well with trials  - will try for trach collar during daytime, PSV for rest overnight    Fever  - febrile with leukocytosis  - on 5/8 patient is felt to have aspirated overnight. Started on zosyn  - Sputum culture with Pseudomonas sensitive to zosyn. 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

## 2022-05-14 NOTE — PROGRESS NOTES
Placed patient back on the vent via ACV with previous settings for rest.  Patient's respiratory rate increased to 44 and was staying around the lower to mid 40's.  Patient's WOB is increased and accessory muscles are being used.  Patient's respiratory rate decreased to 22 when I placed him back on ACV and he seems to be breathing better and no accessory muscles are being used.  I will continue to monitor patient and try to wean him back to CPAP.

## 2022-05-15 NOTE — PROGRESS NOTES
Placed patient back on the vent via CPAP with previous settings for rest.  Patient did Trach Collar 35% during the day today and did fine with no complications.  Patient is resting well on CPAP 5. PSV 15, FIO2 30% @ this time.

## 2022-05-15 NOTE — PROGRESS NOTES
[facility]  Nephrology  Progress Note    Patient Name: Og Garcia  MRN: [id number]  Admission Date: 12/23/2021  Hospital Length of Stay: 143 days  Attending Provider: Cecil Abernathy DO   Primary Care Physician: Hector Arndt DNP, FNP-C  Principal Problem:Denice gangrene    Subjective:     HPI: The patient is known from the previous nephrology consult at [facility].  He is no at Specialty and continues to need dialysis support.      Interval History:  He is awake.  He appears comfortable.  He remains on trach collar    Review of patient's allergies indicates:  No Known Allergies  Current Facility-Administered Medications   Medication Frequency    0.9%  NaCl infusion (for blood administration) Q24H PRN    0.9%  NaCl infusion PRN    acetaminophen tablet 500 mg Q6H PRN    albuterol nebulizer solution 2.5 mg Q4H PRN    albuterol-ipratropium 2.5 mg-0.5 mg/3 mL nebulizer solution 3 mL Q6H    bisacodyL EC tablet 10 mg Daily PRN    dextrose 50% injection 12.5 g PRN    diltiaZEM tablet 90 mg Q6H    glucagon (human recombinant) injection 1 mg PRN    guaiFENesin 100 mg/5 ml syrup 200 mg Q6H    heparin (porcine) injection 4,000 Units PRN    heparin (porcine) injection 5,000 Units Q8H    insulin aspart U-100 injection 1-10 Units Q6H    insulin detemir U-100 injection 25 Units QHS    metoprolol tartrate (LOPRESSOR) split tablet 12.5 mg BID    ondansetron injection 8 mg Q6H PRN    pantoprazole suspension 40 mg Daily    piperacillin-tazobactam (ZOSYN) 4.5 g in dextrose 5 % in water (D5W) 5 % 100 mL IVPB (MB+) Q12H    predniSONE tablet 2.5 mg Daily    sevelamer carbonate pwpk 0.8 g TID WM    silver sulfADIAZINE 1% cream Daily PRN    simethicone chewable tablet 80 mg TID PRN    sodium chloride 0.9% bolus 250 mL PRN       Objective:     Vital Signs (Most Recent):  Temp: 99.4 °F (37.4 °C) (05/15/22 0702)  Pulse: 103 (05/15/22 1253)  Resp: (!) 31 (05/15/22 1253)  BP: (!) 163/61  (05/15/22 1151)  SpO2: 100 % (05/15/22 1253)  O2 Device (Oxygen Therapy): Trach Collar (05/15/22 1253)   Vital Signs (24h Range):  Temp:  [97.6 °F (36.4 °C)-99.4 °F (37.4 °C)] 99.4 °F (37.4 °C)  Pulse:  [] 103  Resp:  [8-37] 31  SpO2:  [100 %] 100 %  BP: (123-168)/(44-73) 163/61     Weight: 78.7 kg (173 lb 8 oz) (05/15/22 0600)  Body mass index is 24.2 kg/m².  Body surface area is 1.99 meters squared.    I/O last 3 completed shifts:  In: 3139.6 [P.O.:480; NG/GT:1980; IV Piggyback:679.6]  Out: 700 [Stool:700]    Physical Exam  Neck:      Trachea: Tracheostomy present.   Cardiovascular:      Rate and Rhythm: Normal rate and regular rhythm.   Pulmonary:      Breath sounds: No wheezing or rales.   Abdominal:      Tenderness: There is no guarding.   Musculoskeletal:      Right lower leg: No edema.      Left lower leg: No edema.   Neurological:      Mental Status: He is lethargic.       Significant Labs:  BMP:   Recent Labs   Lab 05/13/22  0647   *   *   K 4.1   CL 93*   CO2 25   BUN 78*   CREATININE 2.46*   CALCIUM 8.8        Significant Imaging:      Assessment/Plan:     ESRD (end stage renal disease)  Dialysis MWF      Type 2 diabetes mellitus without complication, without long-term current use of insulin  Continue current treatment    Necrotizing fasciitis  Continue wound care and antibiotics      Hypertension   controlled        Thank you for your consult.     Huber Mcnamara MD  Nephrology  [facility]

## 2022-05-15 NOTE — PROGRESS NOTES
[facility]  Nephrology  Progress Note    Patient Name: Og Garcia  MRN: [id number]  Admission Date: 12/23/2021  Hospital Length of Stay: 142 days  Attending Provider: Cecil Abernathy DO   Primary Care Physician: Hector Arndt DNP, FNP-C  Principal Problem:Denice gangrene    Subjective:     HPI: The patient is known from the previous nephrology consult at [facility].  He is no at Specialty and continues to need dialysis support.      Interval History:  He is on trach collar.  He is nonverbal.    Review of patient's allergies indicates:  No Known Allergies  Current Facility-Administered Medications   Medication Frequency    0.9%  NaCl infusion (for blood administration) Q24H PRN    0.9%  NaCl infusion PRN    acetaminophen tablet 500 mg Q6H PRN    albuterol nebulizer solution 2.5 mg Q4H PRN    albuterol-ipratropium 2.5 mg-0.5 mg/3 mL nebulizer solution 3 mL Q6H    bisacodyL EC tablet 10 mg Daily PRN    dextrose 50% injection 12.5 g PRN    diltiaZEM tablet 90 mg Q6H    glucagon (human recombinant) injection 1 mg PRN    guaiFENesin 100 mg/5 ml syrup 200 mg Q6H    heparin (porcine) injection 4,000 Units PRN    heparin (porcine) injection 5,000 Units Q8H    insulin aspart U-100 injection 1-10 Units Q6H    insulin detemir U-100 injection 25 Units QHS    metoprolol tartrate (LOPRESSOR) split tablet 12.5 mg BID    ondansetron injection 8 mg Q6H PRN    pantoprazole suspension 40 mg Daily    piperacillin-tazobactam (ZOSYN) 4.5 g in dextrose 5 % in water (D5W) 5 % 100 mL IVPB (MB+) Q12H    predniSONE tablet 2.5 mg Daily    sevelamer carbonate pwpk 0.8 g TID WM    silver sulfADIAZINE 1% cream Daily PRN    simethicone chewable tablet 80 mg TID PRN    sodium chloride 0.9% bolus 250 mL PRN       Objective:     Vital Signs (Most Recent):  Temp: 97.6 °F (36.4 °C) (05/14/22 1900)  Pulse: 94 (05/14/22 2245)  Resp: 10 (05/14/22 2245)  BP: 138/67 (05/14/22 2245)  SpO2: 100 % (05/14/22  2241)  O2 Device (Oxygen Therapy): ventilator (05/14/22 2001) Vital Signs (24h Range):  Temp:  [97.6 °F (36.4 °C)-99.8 °F (37.7 °C)] 97.6 °F (36.4 °C)  Pulse:  [] 94  Resp:  [9-44] 10  SpO2:  [94 %-100 %] 100 %  BP: (113-171)/(44-73) 138/67     Weight: 76.4 kg (168 lb 6.9 oz) (05/14/22 0600)  Body mass index is 23.49 kg/m².  Body surface area is 1.96 meters squared.    I/O last 3 completed shifts:  In: 4869.6 [P.O.:960; I.V.:20; NG/GT:3210; IV Piggyback:679.6]  Out: 2300 [Other:1500; Stool:800]    Physical Exam  Neck:      Trachea: Tracheostomy present.   Cardiovascular:      Rate and Rhythm: Normal rate and regular rhythm.   Pulmonary:      Breath sounds: No wheezing or rales.   Abdominal:      Tenderness: There is no guarding.   Musculoskeletal:      Right lower leg: No edema.      Left lower leg: No edema.   Neurological:      Mental Status: He is lethargic.       Significant Labs:  BMP:   Recent Labs   Lab 05/13/22  0647   *   *   K 4.1   CL 93*   CO2 25   BUN 78*   CREATININE 2.46*   CALCIUM 8.8        Significant Imaging:      Assessment/Plan:     * Denice gangrene  Continue wound care      Chronic respiratory failure  Concurrently on trach collar    ESRD (end stage renal disease)  Dialysis MWF      Necrotizing fasciitis  Continue wound care and antibiotics      Hypertension   controlled        Thank you for your consult.     Huber Mcnamara MD  Nephrology  [facility]

## 2022-05-15 NOTE — SUBJECTIVE & OBJECTIVE
Interval History:  He is awake.  He appears comfortable.  He remains on trach collar    Review of patient's allergies indicates:  No Known Allergies  Current Facility-Administered Medications   Medication Frequency    0.9%  NaCl infusion (for blood administration) Q24H PRN    0.9%  NaCl infusion PRN    acetaminophen tablet 500 mg Q6H PRN    albuterol nebulizer solution 2.5 mg Q4H PRN    albuterol-ipratropium 2.5 mg-0.5 mg/3 mL nebulizer solution 3 mL Q6H    bisacodyL EC tablet 10 mg Daily PRN    dextrose 50% injection 12.5 g PRN    diltiaZEM tablet 90 mg Q6H    glucagon (human recombinant) injection 1 mg PRN    guaiFENesin 100 mg/5 ml syrup 200 mg Q6H    heparin (porcine) injection 4,000 Units PRN    heparin (porcine) injection 5,000 Units Q8H    insulin aspart U-100 injection 1-10 Units Q6H    insulin detemir U-100 injection 25 Units QHS    metoprolol tartrate (LOPRESSOR) split tablet 12.5 mg BID    ondansetron injection 8 mg Q6H PRN    pantoprazole suspension 40 mg Daily    piperacillin-tazobactam (ZOSYN) 4.5 g in dextrose 5 % in water (D5W) 5 % 100 mL IVPB (MB+) Q12H    predniSONE tablet 2.5 mg Daily    sevelamer carbonate pwpk 0.8 g TID WM    silver sulfADIAZINE 1% cream Daily PRN    simethicone chewable tablet 80 mg TID PRN    sodium chloride 0.9% bolus 250 mL PRN       Objective:     Vital Signs (Most Recent):  Temp: 99.4 °F (37.4 °C) (05/15/22 0702)  Pulse: 103 (05/15/22 1253)  Resp: (!) 31 (05/15/22 1253)  BP: (!) 163/61 (05/15/22 1151)  SpO2: 100 % (05/15/22 1253)  O2 Device (Oxygen Therapy): Trach Collar (05/15/22 1253)   Vital Signs (24h Range):  Temp:  [97.6 °F (36.4 °C)-99.4 °F (37.4 °C)] 99.4 °F (37.4 °C)  Pulse:  [] 103  Resp:  [8-37] 31  SpO2:  [100 %] 100 %  BP: (123-168)/(44-73) 163/61     Weight: 78.7 kg (173 lb 8 oz) (05/15/22 0600)  Body mass index is 24.2 kg/m².  Body surface area is 1.99 meters squared.    I/O last 3 completed shifts:  In: 3139.6 [P.O.:480; NG/GT:1980; IV  Piggyback:679.6]  Out: 700 [Stool:700]    Physical Exam  Neck:      Trachea: Tracheostomy present.   Cardiovascular:      Rate and Rhythm: Normal rate and regular rhythm.   Pulmonary:      Breath sounds: No wheezing or rales.   Abdominal:      Tenderness: There is no guarding.   Musculoskeletal:      Right lower leg: No edema.      Left lower leg: No edema.   Neurological:      Mental Status: He is lethargic.       Significant Labs:  BMP:   Recent Labs   Lab 05/13/22  0647   *   *   K 4.1   CL 93*   CO2 25   BUN 78*   CREATININE 2.46*   CALCIUM 8.8        Significant Imaging:

## 2022-05-15 NOTE — PROGRESS NOTES
[facility]  Pulmonology  Progress Note    Patient Name: Og Garcia  MRN: [id number]  Admission Date: 12/23/2021  Hospital Length of Stay: 143 days  Code Status: Prior  Attending Provider: Cecil Abernathy DO  Primary Care Provider: Hector Arndt DNP, FNP-C   Principal Problem: Denice gangrene    Subjective:     Interval History: tolerating trach collar trials    Objective:     Vital Signs (Most Recent):  Temp: 99.4 °F (37.4 °C) (05/15/22 0702)  Pulse: (!) 112 (05/15/22 0845)  Resp: (!) 36 (05/15/22 0845)  BP: (!) 152/61 (05/15/22 0845)  SpO2: 100 % (05/15/22 0845)   Vital Signs (24h Range):  Temp:  [97.6 °F (36.4 °C)-99.4 °F (37.4 °C)] 99.4 °F (37.4 °C)  Pulse:  [] 112  Resp:  [8-37] 36  SpO2:  [100 %] 100 %  BP: (123-168)/(44-73) 152/61     Weight: 78.7 kg (173 lb 8 oz)  Body mass index is 24.2 kg/m².      Intake/Output Summary (Last 24 hours) at 5/15/2022 1034  Last data filed at 5/15/2022 0800  Gross per 24 hour   Intake 1810 ml   Output 700 ml   Net 1110 ml       Physical Exam  Vitals reviewed.   Constitutional:       General: He is not in acute distress.     Appearance: He is ill-appearing.      Comments: Chronically ill   HENT:      Head:      Comments: Right temporal deformity     Nose: Nose normal.      Mouth/Throat:      Mouth: Mucous membranes are moist.   Eyes:      Comments: enucleated right eye   Neck:      Comments: Tracheostomy in place  Cardiovascular:      Rate and Rhythm: Normal rate and regular rhythm.      Pulses: Normal pulses.      Heart sounds: Normal heart sounds.   Pulmonary:      Effort: No respiratory distress.      Breath sounds: No stridor. No wheezing, rhonchi or rales.      Comments: Coarse sounds bilaterally  Abdominal:      Palpations: Abdomen is soft.      Tenderness: There is no guarding.      Comments: PEG   Musculoskeletal:         General: No swelling or deformity.      Cervical back: Neck supple.      Right lower leg: No edema.       Left lower leg: No edema.      Comments: Thenar wasting.    Lymphadenopathy:      Cervical: No cervical adenopathy.   Skin:     General: Skin is warm and dry.   Neurological:      Mental Status: He is alert.      Cranial Nerves: No cranial nerve deficit.      Motor: Weakness present.       Vents:  Vent Mode: CPAP / PSV (05/15/22 0335)  Ventilator Initiated: No (04/23/22 1934)  Set Rate: 14 BPM (05/14/22 0800)  Vt Set: 480 mL (05/14/22 0800)  Pressure Support: 15 cmH20 (05/15/22 0335)  PEEP/CPAP: 5 cmH20 (05/15/22 0335)  Oxygen Concentration (%): 30 (05/15/22 0335)  Peak Airway Pressure: 20 cmH2O (05/15/22 0335)  Plateau Pressure: 20 cmH20 (03/07/22 0500)  Total Ve: 8.6 mL (05/15/22 0335)  F/VT Ratio<105 (RSBI): (!) 19.89 (05/15/22 0335)    Lines/Drains/Airways       Central Venous Catheter Line  Duration                  Hemodialysis Catheter 12/30/21 0900 right internal jugular 136 days              Drain  Duration                  Colostomy 12/18/21 1030 Descending/sigmoid  days         Gastrostomy/Enterostomy 03/09/22 1436 Percutaneous endoscopic gastrostomy (PEG) midline feeding 66 days              Airway  Duration                  Surgical Airway 04/27/22 0856 Shiley;Other (Comment) Cuffed;Long 18 days              Peripheral Intravenous Line  Duration                  Peripheral IV - Single Lumen 04/11/22 1623 18 G Anterior;Proximal;Right Forearm 33 days                    Significant Labs:    CBC/Anemia Profile:  No results for input(s): WBC, HGB, HCT, PLT, MCV, RDW, IRON, FERRITIN, RETIC, FOLATE, [id number], OCCULTBLOOD in the last 48 hours.     Chemistries:  No results for input(s): NA, K, CL, CO2, BUN, CREATININE, CALCIUM, ALBUMIN, PROT, BILITOT, ALKPHOS, ALT, AST, GLUCOSE, MG, PHOS in the last 48 hours.    None    Significant Imaging:  None    Assessment/Plan:     * Denice gangrene  Stable with no acute issues          Chronic respiratory failure  - previously on trach collar, regressed  - doing  well with trials  - can progress to trach collar continuous if tolerate  - may need PSV for rest overnight    Fever  - febrile with leukocytosis  - on 5/8 patient is felt to have aspirated overnight. Started on zosyn  - Sputum culture with Pseudomonas sensitive to zosyn. 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

## 2022-05-15 NOTE — SUBJECTIVE & OBJECTIVE
Interval History:  He is on trach collar.  He is nonverbal.    Review of patient's allergies indicates:  No Known Allergies  Current Facility-Administered Medications   Medication Frequency    0.9%  NaCl infusion (for blood administration) Q24H PRN    0.9%  NaCl infusion PRN    acetaminophen tablet 500 mg Q6H PRN    albuterol nebulizer solution 2.5 mg Q4H PRN    albuterol-ipratropium 2.5 mg-0.5 mg/3 mL nebulizer solution 3 mL Q6H    bisacodyL EC tablet 10 mg Daily PRN    dextrose 50% injection 12.5 g PRN    diltiaZEM tablet 90 mg Q6H    glucagon (human recombinant) injection 1 mg PRN    guaiFENesin 100 mg/5 ml syrup 200 mg Q6H    heparin (porcine) injection 4,000 Units PRN    heparin (porcine) injection 5,000 Units Q8H    insulin aspart U-100 injection 1-10 Units Q6H    insulin detemir U-100 injection 25 Units QHS    metoprolol tartrate (LOPRESSOR) split tablet 12.5 mg BID    ondansetron injection 8 mg Q6H PRN    pantoprazole suspension 40 mg Daily    piperacillin-tazobactam (ZOSYN) 4.5 g in dextrose 5 % in water (D5W) 5 % 100 mL IVPB (MB+) Q12H    predniSONE tablet 2.5 mg Daily    sevelamer carbonate pwpk 0.8 g TID WM    silver sulfADIAZINE 1% cream Daily PRN    simethicone chewable tablet 80 mg TID PRN    sodium chloride 0.9% bolus 250 mL PRN       Objective:     Vital Signs (Most Recent):  Temp: 97.6 °F (36.4 °C) (05/14/22 1900)  Pulse: 94 (05/14/22 2245)  Resp: 10 (05/14/22 2245)  BP: 138/67 (05/14/22 2245)  SpO2: 100 % (05/14/22 2245)  O2 Device (Oxygen Therapy): ventilator (05/14/22 2001) Vital Signs (24h Range):  Temp:  [97.6 °F (36.4 °C)-99.8 °F (37.7 °C)] 97.6 °F (36.4 °C)  Pulse:  [] 94  Resp:  [9-44] 10  SpO2:  [94 %-100 %] 100 %  BP: (113-171)/(44-73) 138/67     Weight: 76.4 kg (168 lb 6.9 oz) (05/14/22 0600)  Body mass index is 23.49 kg/m².  Body surface area is 1.96 meters squared.    I/O last 3 completed shifts:  In: 4869.6 [P.O.:960; I.V.:20; NG/GT:3210; IV Piggyback:679.6]  Out: 2300  [Other:1500; Stool:800]    Physical Exam  Neck:      Trachea: Tracheostomy present.   Cardiovascular:      Rate and Rhythm: Normal rate and regular rhythm.   Pulmonary:      Breath sounds: No wheezing or rales.   Abdominal:      Tenderness: There is no guarding.   Musculoskeletal:      Right lower leg: No edema.      Left lower leg: No edema.   Neurological:      Mental Status: He is lethargic.       Significant Labs:  BMP:   Recent Labs   Lab 05/13/22  0647   *   *   K 4.1   CL 93*   CO2 25   BUN 78*   CREATININE 2.46*   CALCIUM 8.8        Significant Imaging:

## 2022-05-15 NOTE — PLAN OF CARE
Problem: Adult Inpatient Plan of Care  Goal: Plan of Care Review  Outcome: Ongoing, Progressing  Goal: Patient-Specific Goal (Individualized)  Outcome: Ongoing, Progressing  Goal: Absence of Hospital-Acquired Illness or Injury  Outcome: Ongoing, Progressing  Goal: Optimal Comfort and Wellbeing  Outcome: Ongoing, Progressing     Problem: Adult Inpatient Plan of Care  Goal: Readiness for Transition of Care  Outcome: Ongoing, Not Progressing     Problem: Adult Inpatient Plan of Care  Goal: Plan of Care Review  Outcome: Ongoing, Progressing  Goal: Patient-Specific Goal (Individualized)  Outcome: Ongoing, Progressing  Goal: Absence of Hospital-Acquired Illness or Injury  Outcome: Ongoing, Progressing  Goal: Optimal Comfort and Wellbeing  Outcome: Ongoing, Progressing

## 2022-05-15 NOTE — SUBJECTIVE & OBJECTIVE
Interval History: tolerating trach collar trials    Objective:     Vital Signs (Most Recent):  Temp: 99.4 °F (37.4 °C) (05/15/22 0702)  Pulse: (!) 112 (05/15/22 0845)  Resp: (!) 36 (05/15/22 0845)  BP: (!) 152/61 (05/15/22 0845)  SpO2: 100 % (05/15/22 0845)   Vital Signs (24h Range):  Temp:  [97.6 °F (36.4 °C)-99.4 °F (37.4 °C)] 99.4 °F (37.4 °C)  Pulse:  [] 112  Resp:  [8-37] 36  SpO2:  [100 %] 100 %  BP: (123-168)/(44-73) 152/61     Weight: 78.7 kg (173 lb 8 oz)  Body mass index is 24.2 kg/m².      Intake/Output Summary (Last 24 hours) at 5/15/2022 1034  Last data filed at 5/15/2022 0800  Gross per 24 hour   Intake 1810 ml   Output 700 ml   Net 1110 ml       Physical Exam  Vitals reviewed.   Constitutional:       General: He is not in acute distress.     Appearance: He is ill-appearing.      Comments: Chronically ill   HENT:      Head:      Comments: Right temporal deformity     Nose: Nose normal.      Mouth/Throat:      Mouth: Mucous membranes are moist.   Eyes:      Comments: enucleated right eye   Neck:      Comments: Tracheostomy in place  Cardiovascular:      Rate and Rhythm: Normal rate and regular rhythm.      Pulses: Normal pulses.      Heart sounds: Normal heart sounds.   Pulmonary:      Effort: No respiratory distress.      Breath sounds: No stridor. No wheezing, rhonchi or rales.      Comments: Coarse sounds bilaterally  Abdominal:      Palpations: Abdomen is soft.      Tenderness: There is no guarding.      Comments: PEG   Musculoskeletal:         General: No swelling or deformity.      Cervical back: Neck supple.      Right lower leg: No edema.      Left lower leg: No edema.      Comments: Thenar wasting.    Lymphadenopathy:      Cervical: No cervical adenopathy.   Skin:     General: Skin is warm and dry.   Neurological:      Mental Status: He is alert.      Cranial Nerves: No cranial nerve deficit.      Motor: Weakness present.       Vents:  Vent Mode: CPAP / PSV (05/15/22 0335)  Ventilator  Initiated: No (04/23/22 1934)  Set Rate: 14 BPM (05/14/22 0800)  Vt Set: 480 mL (05/14/22 0800)  Pressure Support: 15 cmH20 (05/15/22 0335)  PEEP/CPAP: 5 cmH20 (05/15/22 0335)  Oxygen Concentration (%): 30 (05/15/22 0335)  Peak Airway Pressure: 20 cmH2O (05/15/22 0335)  Plateau Pressure: 20 cmH20 (03/07/22 0500)  Total Ve: 8.6 mL (05/15/22 0335)  F/VT Ratio<105 (RSBI): (!) 19.89 (05/15/22 0335)    Lines/Drains/Airways       Central Venous Catheter Line  Duration                  Hemodialysis Catheter 12/30/21 0900 right internal jugular 136 days              Drain  Duration                  Colostomy 12/18/21 1030 Descending/sigmoid  days         Gastrostomy/Enterostomy 03/09/22 1436 Percutaneous endoscopic gastrostomy (PEG) midline feeding 66 days              Airway  Duration                  Surgical Airway 04/27/22 0856 Elizabeth[place];Other (Comment) Cuffed;Long 18 days              Peripheral Intravenous Line  Duration                  Peripheral IV - Single Lumen 04/11/22 1623 18 G Anterior;Proximal;Right Forearm 33 days                    Significant Labs:    CBC/Anemia Profile:  No results for input(s): WBC, HGB, HCT, PLT, MCV, RDW, IRON, FERRITIN, RETIC, FOLATE, [id number], OCCULTBLOOD in the last 48 hours.     Chemistries:  No results for input(s): NA, K, CL, CO2, BUN, CREATININE, CALCIUM, ALBUMIN, PROT, BILITOT, ALKPHOS, ALT, AST, GLUCOSE, MG, PHOS in the last 48 hours.    None    Significant Imaging:  None

## 2022-05-16 NOTE — PROGRESS NOTES
[facility]  Pulmonology  Progress Note    Patient Name: Og Garcia  MRN: [id number]  Admission Date: 12/23/2021  Hospital Length of Stay: 144 days  Code Status: Prior  Attending Provider: Cecil Abernathy DO  Primary Care Provider: Hector Arndt DNP, FNP-C   Principal Problem: Denice gangrene    Subjective:     Interval History: No acute events overnight. The patient is afebrile this am. He is currently resting comfortably. Oxygenating adequately. He is on trach collar during the day and cpap overnight.    Objective:     Vital Signs (Most Recent):  Temp: 100.3 °F (37.9 °C) (05/16/22 0730)  Pulse: 106 (05/16/22 0845)  Resp: (!) 23 (05/16/22 0845)  BP: (!) 142/57 (05/16/22 0845)  SpO2: 100 % (05/16/22 0845)   Vital Signs (24h Range):  Temp:  [97.2 °F (36.2 °C)-100.3 °F (37.9 °C)] 100.3 °F (37.9 °C)  Pulse:  [] 106  Resp:  [7-36] 23  SpO2:  [100 %] 100 %  BP: (117-168)/(48-67) 142/57     Weight: 78.3 kg (172 lb 9.9 oz)  Body mass index is 24.08 kg/m².      Intake/Output Summary (Last 24 hours) at 5/16/2022 1026  Last data filed at 5/16/2022 0800  Gross per 24 hour   Intake 2660 ml   Output 501 ml   Net 2159 ml         Physical Exam    Vents:  Vent Mode: CPAP PSV (05/16/22 0810)  Ventilator Initiated: No (04/23/22 1934)  Set Rate: 0 BPM (05/15/22 0750)  Vt Set: 0 mL (05/15/22 0750)  Pressure Support: 15 cmH20 (05/16/22 0810)  PEEP/CPAP: 5 cmH20 (05/16/22 0810)  Oxygen Concentration (%): 35 (05/16/22 0845)  Peak Airway Pressure: 21 cmH2O (05/16/22 0810)  Plateau Pressure: 20 cmH20 (03/07/22 0500)  Total Ve: 5.4 mL (05/16/22 0810)  F/VT Ratio<105 (RSBI): (!) 20.34 (05/16/22 0432)    Lines/Drains/Airways       Central Venous Catheter Line  Duration                  Hemodialysis Catheter 12/30/21 0900 right internal jugular 137 days              Drain  Duration                  Colostomy 12/18/21 1030 Descending/sigmoid  days         Gastrostomy/Enterostomy 03/09/22 1436  Percutaneous endoscopic gastrostomy (PEG) midline feeding 67 days              Airway  Duration                  Surgical Airway 04/27/22 0856 Mary[place];Other (Comment) Cuffed;Long 19 days              Peripheral Intravenous Line  Duration                  Peripheral IV - Single Lumen 04/11/22 1623 18 G Anterior;Proximal;Right Forearm 34 days                    Significant Labs:    CBC/Anemia Profile:  Recent Labs   Lab 05/16/22  0632   WBC 15.19*   HGB 7.7*   HCT 22.9*   *   MCV 93.5   RDW 14.8*          Chemistries:  Recent Labs   Lab 05/16/22  0632   *   K 3.8   CL 94*   CO2 24   BUN 88*   CREATININE 2.47*   CALCIUM 8.8         All pertinent labs within the past 24 hours have been reviewed.    Significant Imaging:  I have reviewed all pertinent imaging results/findings within the past 24 hours.    Assessment/Plan:     Chronic respiratory failure  - previously on trach collar, regressed  - doing well with trials  - can progress to trach collar continuous if tolerate  - may need PSV for rest overnight  5/16- currently doing well with trach collar during the day and PSV overnight    Pressure ulcer of sacral region, unstageable  Wound care is following the patient  Their help reviewed and appreciated    Fever  - febrile with leukocytosis  - on 5/8 patient is felt to have aspirated overnight. Started on zosyn  - Sputum culture with Pseudomonas sensitive to zosyn. Will continue for 7 days   Will DC after today    ESRD (end stage renal disease)  Started on HD 12/30   Continue with HD per nephrology      Type 2 diabetes mellitus without complication, without long-term current use of insulin  Blood sugar is well controlled    Acute blood loss anemia  No active bleeding  Hgb 6.0 on 4/11  Patient transfused 1 unit with dialysis yesterday  4/13 -H/H 7.5/22.7  4/15- hgb is 6.2 today. We are going to give another unit of PRBC's today with dialysis  4/16 post transfusion cbc is pending  4/18- hgb is 6.6. Will plan for  transfusion with 1 unit of prbc's with next HD  4/21 - 7.2/21.6   4/25- 6.6/19.7 - will transfuse one unit PRBCs with next HD   4/28- Hgb now 8.0 following transfusion  5/2/2022 hemoglobin 7 no need for transfusion yet  5/3/2022 transfuse 1 unit packed red blood cells  05/05/2022 I believe this anemia is probably related to his chronic renal sufficiency a wonder if he is a candidate for Epogen at this point  5/10- transfused 1 unit yesterday  5/12 repeat H&H in am  05/13/2022 in 2 H&H pending  Most recent 7.7/22.9    Necrotizing fasciitis  S/p multiple surgeries   Last debridement for wounds was 4/13      Hypertension  BP is stable  Currently on rate control medications (cardizem 90 mg q6 and metoprolol 12.5 bid)        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

## 2022-05-16 NOTE — PLAN OF CARE
Problem: Adult Inpatient Plan of Care  Goal: Plan of Care Review  Outcome: Ongoing, Progressing  Flowsheets (Taken 5/16/2022 1839)  Plan of Care Reviewed With:   patient   son  Goal: Patient-Specific Goal (Individualized)  Outcome: Ongoing, Progressing  Flowsheets (Taken 5/16/2022 1839)  Anxieties, Fears or Concerns: Fear of being unable to breathe  Individualized Care Needs: Will maintain oxygen saturation on trach collar  Patient-Specific Goals (Include Timeframe): Family will understand the care needed for patient prior to discharge  Goal: Absence of Hospital-Acquired Illness or Injury  Outcome: Ongoing, Progressing

## 2022-05-16 NOTE — PLAN OF CARE
SS spoke with pt's daughter in law, Alessandra, on phone about facilities that accept vent dialysis patients. Referral made to [facility] and SS will also look in [place] area and see about facilities that accept patients in that area.

## 2022-05-16 NOTE — SUBJECTIVE & OBJECTIVE
Interval History: This patient is resting.  There are no other changes. He is doing CPAP trials.    Review of patient's allergies indicates:  No Known Allergies  Current Facility-Administered Medications   Medication Frequency    0.9%  NaCl infusion (for blood administration) Q24H PRN    0.9%  NaCl infusion PRN    acetaminophen tablet 500 mg Q6H PRN    albuterol nebulizer solution 2.5 mg Q4H PRN    albuterol-ipratropium 2.5 mg-0.5 mg/3 mL nebulizer solution 3 mL Q6H    bisacodyL EC tablet 10 mg Daily PRN    dextrose 50% injection 12.5 g PRN    diltiaZEM tablet 90 mg Q6H    glucagon (human recombinant) injection 1 mg PRN    guaiFENesin 100 mg/5 ml syrup 200 mg Q6H    heparin (porcine) injection 4,000 Units PRN    heparin (porcine) injection 5,000 Units Q8H    insulin aspart U-100 injection 1-10 Units Q6H    insulin detemir U-100 injection 25 Units QHS    metoprolol tartrate (LOPRESSOR) split tablet 12.5 mg BID    ondansetron injection 8 mg Q6H PRN    pantoprazole suspension 40 mg Daily    piperacillin-tazobactam (ZOSYN) 4.5 g in dextrose 5 % in water (D5W) 5 % 100 mL IVPB (MB+) Q12H    predniSONE tablet 2.5 mg Daily    sevelamer carbonate pwpk 0.8 g TID WM    silver sulfADIAZINE 1% cream Daily PRN    simethicone chewable tablet 80 mg TID PRN    sodium chloride 0.9% bolus 250 mL PRN       Objective:     Vital Signs (Most Recent):  Temp: 97.9 °F (36.6 °C) (05/16/22 1340)  Pulse: 101 (05/16/22 1415)  Resp: (!) 35 (05/16/22 1415)  BP: (!) 93/35 (05/16/22 1415)  SpO2: 100 % (05/16/22 0845)  O2 Device (Oxygen Therapy): Trach Collar (05/16/22 1215)   Vital Signs (24h Range):  Temp:  [97.2 °F (36.2 °C)-100.3 °F (37.9 °C)] 97.9 °F (36.6 °C)  Pulse:  [] 101  Resp:  [7-35] 35  SpO2:  [100 %] 100 %  BP: ()/(35-67) 93/35     Weight: 78.3 kg (172 lb 9.9 oz) (05/16/22 0400)  Body mass index is 24.08 kg/m².  Body surface area is 1.98 meters squared.    I/O last 3 completed shifts:  In: 2780 [P.O.:480; NG/GT:2100; IV  Piggyback:200]  Out: 801 [Urine:1; Stool:800]    Physical Exam  Vitals reviewed.   HENT:      Head: Normocephalic.   Cardiovascular:      Rate and Rhythm: Regular rhythm.      Pulses: Normal pulses.   Pulmonary:      Effort: Pulmonary effort is normal.   Abdominal:      Palpations: Abdomen is soft.   Neurological:      Mental Status: He is alert.       Significant Labs:  BMP:   Recent Labs   Lab 05/16/22  0632   GLU 86   *   K 3.8   CL 94*   CO2 24   BUN 88*   CREATININE 2.47*   CALCIUM 8.8     CBC:   Recent Labs   Lab 05/16/22  0632   WBC 15.19*   RBC 2.45*   HGB 7.7*   HCT 22.9*   *   MCV 93.5   MCH 31.4*   MCHC 33.6        Significant Imaging:  Labs: Reviewed

## 2022-05-16 NOTE — PROGRESS NOTES
[facility]  Nephrology  Progress Note    Patient Name: Og Garcia  MRN: [id number]  Admission Date: 12/23/2021  Hospital Length of Stay: 144 days  Attending Provider: Cecil Abernathy DO   Primary Care Physician: Hector Arndt DNP, FNP-C  Principal Problem:Denice gangrene    Subjective:     HPI: The patient is known from the previous nephrology consult at [facility].  He is no at Specialty and continues to need dialysis support.      Interval History: This patient is resting.  There are no other changes. He is doing CPAP trials.    Review of patient's allergies indicates:  No Known Allergies  Current Facility-Administered Medications   Medication Frequency    0.9%  NaCl infusion (for blood administration) Q24H PRN    0.9%  NaCl infusion PRN    acetaminophen tablet 500 mg Q6H PRN    albuterol nebulizer solution 2.5 mg Q4H PRN    albuterol-ipratropium 2.5 mg-0.5 mg/3 mL nebulizer solution 3 mL Q6H    bisacodyL EC tablet 10 mg Daily PRN    dextrose 50% injection 12.5 g PRN    diltiaZEM tablet 90 mg Q6H    glucagon (human recombinant) injection 1 mg PRN    guaiFENesin 100 mg/5 ml syrup 200 mg Q6H    heparin (porcine) injection 4,000 Units PRN    heparin (porcine) injection 5,000 Units Q8H    insulin aspart U-100 injection 1-10 Units Q6H    insulin detemir U-100 injection 25 Units QHS    metoprolol tartrate (LOPRESSOR) split tablet 12.5 mg BID    ondansetron injection 8 mg Q6H PRN    pantoprazole suspension 40 mg Daily    piperacillin-tazobactam (ZOSYN) 4.5 g in dextrose 5 % in water (D5W) 5 % 100 mL IVPB (MB+) Q12H    predniSONE tablet 2.5 mg Daily    sevelamer carbonate pwpk 0.8 g TID WM    silver sulfADIAZINE 1% cream Daily PRN    simethicone chewable tablet 80 mg TID PRN    sodium chloride 0.9% bolus 250 mL PRN       Objective:     Vital Signs (Most Recent):  Temp: 97.9 °F (36.6 °C) (05/16/22 1340)  Pulse: 101 (05/16/22 1415)  Resp: (!) 35 (05/16/22 2483)  BP: (!)  93/35 (05/16/22 1415)  SpO2: 100 % (05/16/22 0845)  O2 Device (Oxygen Therapy): Trach Collar (05/16/22 1215)   Vital Signs (24h Range):  Temp:  [97.2 °F (36.2 °C)-100.3 °F (37.9 °C)] 97.9 °F (36.6 °C)  Pulse:  [] 101  Resp:  [7-35] 35  SpO2:  [100 %] 100 %  BP: ()/(35-67) 93/35     Weight: 78.3 kg (172 lb 9.9 oz) (05/16/22 0400)  Body mass index is 24.08 kg/m².  Body surface area is 1.98 meters squared.    I/O last 3 completed shifts:  In: 2780 [P.O.:480; NG/GT:2100; IV Piggyback:200]  Out: 801 [Urine:1; Stool:800]    Physical Exam  Vitals reviewed.   HENT:      Head: Normocephalic.   Cardiovascular:      Rate and Rhythm: Regular rhythm.      Pulses: Normal pulses.   Pulmonary:      Effort: Pulmonary effort is normal.   Abdominal:      Palpations: Abdomen is soft.   Neurological:      Mental Status: He is alert.       Significant Labs:  BMP:   Recent Labs   Lab 05/16/22  0632   GLU 86   *   K 3.8   CL 94*   CO2 24   BUN 88*   CREATININE 2.47*   CALCIUM 8.8     CBC:   Recent Labs   Lab 05/16/22  0632   WBC 15.19*   RBC 2.45*   HGB 7.7*   HCT 22.9*   *   MCV 93.5   MCH 31.4*   MCHC 33.6        Significant Imaging:  Labs: Reviewed    Assessment/Plan:     ESRD (end stage renal disease)  Dialysis MWF  5/16/2022 Continue with scheduled hemodialysis.      Hypertension   Controlled  5/16/2022  Hemodynamics are acceptable.        Thank you for your consult. I will follow-up with patient. Please contact us if you have any additional questions.    Edwin Pryor Jr, MD  Nephrology  [facility]

## 2022-05-16 NOTE — ASSESSMENT & PLAN NOTE
- previously on trach collar, regressed  - doing well with trials  - can progress to trach collar continuous if tolerate  - may need PSV for rest overnight  5/16- currently doing well with trach collar during the day and PSV overnight

## 2022-05-16 NOTE — ASSESSMENT & PLAN NOTE
- febrile with leukocytosis  - on 5/8 patient is felt to have aspirated overnight. Started on zosyn  - Sputum culture with Pseudomonas sensitive to zosyn. Will continue for 7 days   Will DC after today

## 2022-05-16 NOTE — PROGRESS NOTES
Inflated patient's trach cuff and placed patient on the vent via CPAP with previous settings for rest.  Patient did Trach Collar 35% during the day and rest on CPAP 5, PSV 10, FIO2 30% at night.  Patient did fine on Trach Collar doing the day with no complications.

## 2022-05-16 NOTE — PROGRESS NOTES
[facility]  Adult Nutrition  Follow-up Note         Reason for Assessment  Reason For Assessment: RD follow-up  Nutrition Risk Screen: tube feeding or parenteral nutrition  Malnutrition  Is Patient Malnourished: No  Nutrition Diagnosis  Increased protein Needs   related to Decreased/ impaired skin integrity as evidenced by wounds    Nutrition Diagnosis Status: Chronic/ continues      Nutrition Risk  Level of Risk/Frequency of Follow-up: high   Chewing or Swallowing Difficulty?: No swallowing difficulty  Estimated/Assessed Needs  RMR ([place]-St. Jeor Equation): 1580.13 Activity Factor: 1 Injury Factor: 1.2   Total Ve: 5.4 mL Temp: 100.3 °F (37.9 °C)Axillary  Weight Used For Calorie Calculations: 78.3 kg (172 lb 9.9 oz)   Energy Need Method: [facility] Energy Calorie Requirements (kcal): 1608  Weight Used For Protein Calculations: 73.6 kg (162 lb 4.1 oz)  Protein Requirements:   Estimated Fluid Requirement Method: RDA Method Fluid Requirements (mL): 2105  RDA Method (mL): 1608     Nutrition Prescription / Recommendations  Recommendation/Intervention: PEG tube feeding: Nepro @ 60ml/hr; H20 flush of 50ml q 4hr  Goals: meet est nutr needs; wound healing; TF tolerance  Nutrition Goal Status: progressing towards goal  Communication of RD Recs: discussed on rounds  Current Diet Order: PEG tube feeding: Nepro @ 60ml/hr; H20 flush of 50ml q 4hr  Nutrition Order Comments: PEG tube feeding:Nepro @ 60ml/hr; H20 flush of 50ml q 4hr  Current Nutrition Support Formula Ordered: Nepro  Current Nutrition Support Rate Ordered: 60 (ml)  Current Nutrition Support Frequency Ordered: hourly  Recommended Diet: Enteral Nutrition PEG tube feeding: Nepro @ 60ml/hr; H20 flush of 50ml q 4hr  Recommended Oral Supplement: Matthew [90 kcals, 2.5g Protein, 10g Carbs(3g Sugar), 7g L-Arginine, 7g L-Glutamine, Vitamin C 300mg, 9.5mg Zinc] twice daily  Is Nutrition Support Recommended: yes  Is Education Recommended:  No  Monitor and Evaluation  % current Intake: Enteral Nutrition at goal  % intake to meet estimated needs: Enteral Nutrition   Food and Nutrient Intake: enteral nutrition intake  Food and Nutrient Adminstration: enteral and parenteral nutrition administration  Anthropometric Measurements: weight, height/length, weight change, body mass index  Biochemical Data, Medical Tests and Procedures: electrolyte and renal panel, glucose/endocrine profile  Nutrition-Focused Physical Findings: skin  Enteral Calories (kcal): 2592  Enteral Protein (gm): 115  Enteral (Free Water) Fluid (mL): 1037  Free Water Flush Fluid (mL): 300  Parenteral Calories (kcal): 845  Parenteral Protein (gm): 48  Parenteral Fluid (mL): 960  Lipid Calories (kcals): 500 kcals  Other Calories (kcal): 528 (propofol)  Total Calories (kcal): 2160  Total Calories (kcal/kg): 1337  % Kcal Needs: 100  Total Protein (gm): 135  % Protein Needs: 100  IV Fluid (mL): 1764  Total Fluid Intake (mL): 1337  Energy Calories Required: meeting needs  Protein Required: meeting needs  Fluid Required: meeting needs  Tolerance: tolerating  Current Medical Diagnosis and Past Medical History  Diagnosis: renal disease, gastrointestinal disease, infection/sepsis  Past Medical History:   Diagnosis Date    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    Hyperlipidemia     Hypertension     On mechanically assisted ventilation 12/14/2021     Nutrition/Diet History  Spiritual, Cultural Beliefs, Holiness Practices, Values that Affect Care: no  Supplemental Drinks or Food Habits: Matthew, Nepro  Food Allergies: NKFA  Factors Affecting Nutritional Intake: None identified at this time  Lab/Procedures/Meds  Recent Labs   Lab 05/16/22  0632   *   K 3.8   BUN 88*   CREATININE 2.47*   GLU 86   CALCIUM 8.8   CL 94*     Last A1c: No results found for: HGBA1C  Lab Results   Component Value Date    RBC 2.45 (L) 05/16/2022    HGB 7.7 (L) 05/16/2022    HCT 22.9 (L) 05/16/2022    MCV 93.5 05/16/2022     "MCH 31.4 (H) 05/16/2022    MCHC 33.6 05/16/2022    TIBC 152 (L) 05/04/2022     Pertinent Labs Reviewed: reviewed  Pertinent Labs Comments: na 133, k 3.7, crea 2.85, GFR 24  Pertinent Medications Reviewed: reviewed  Pertinent Medications Comments: heparin, insulin  Anthropometrics  Temp: 100.3 °F (37.9 °C)  Height Method: Stated  Height: 5' 11" (180.3 cm)  Height (inches): 71 in  Weight Method: Bed Scale  Weight: 78.3 kg (172 lb 9.9 oz)  Weight (lb): 172.62 lb  Ideal Body Weight (IBW), Male: 172 lb  % Ideal Body Weight, Male (lb): 94.34 %  BMI (Calculated): 24.1  BMI Grade: 18.5-24.9 - normal     Nutrition by Nursing  Diet/Nutrition Received: tube feeding  Intake (%): 100%  Diet/Feeding Assistance: total feed  Diet/Feeding Tolerance: other (see comments)  Last Bowel Movement: 05/16/22  [REMOVED]      NG/OG Tube Powderhorn sump 18 Fr. Left nostril-Feeding Type: continuous, by pump       Gastrostomy/Enterostomy 03/09/22 1436 Percutaneous endoscopic gastrostomy (PEG) midline feeding-Feeding Type: continuous, by pump  [REMOVED]      NG/OG Tube Powderhorn sump 18 Fr. Left nostril-Current Rate (mL/hr): 50 mL/hr       Gastrostomy/Enterostomy 03/09/22 1436 Percutaneous endoscopic gastrostomy (PEG) midline feeding-Current Rate (mL/hr): 60 mL/hr  [REMOVED]      NG/OG Tube Powderhorn sump 18 Fr. Left nostril-Goal Rate (mL/hr): 75 mL/hr       Gastrostomy/Enterostomy 03/09/22 1436 Percutaneous endoscopic gastrostomy (PEG) midline feeding-Goal Rate (mL/hr): 60 mL/hr  [REMOVED]      NG/OG Tube Powderhorn sump 18 Fr. Left nostril-Formula Name: nepro 1.8       Gastrostomy/Enterostomy 03/09/22 1436 Percutaneous endoscopic gastrostomy (PEG) midline feeding-Formula Name: Nepro  Nutrition Follow-Up  RD Follow-up?: Yes  Assessment and Plan  No new Assessment & Plan notes have been filed under this hospital service since the last note was generated.  Service: Nutrition     "

## 2022-05-16 NOTE — ASSESSMENT & PLAN NOTE
No active bleeding  Hgb 6.0 on 4/11  Patient transfused 1 unit with dialysis yesterday  4/13 -H/H 7.5/22.7  4/15- hgb is 6.2 today. We are going to give another unit of PRBC's today with dialysis  4/16 post transfusion cbc is pending  4/18- hgb is 6.6. Will plan for transfusion with 1 unit of prbc's with next HD  4/21 - 7.2/21.6   4/25- 6.6/19.7 - will transfuse one unit PRBCs with next HD   4/28- Hgb now 8.0 following transfusion  5/2/2022 hemoglobin 7 no need for transfusion yet  5/3/2022 transfuse 1 unit packed red blood cells  05/05/2022 I believe this anemia is probably related to his chronic renal sufficiency a wonder if he is a candidate for Epogen at this point  5/10- transfused 1 unit yesterday  5/12 repeat H&H in am  05/13/2022 in 2 H&H pending  Most recent 7.7/22.9

## 2022-05-17 NOTE — ASSESSMENT & PLAN NOTE
- febrile with leukocytosis  - on 5/8 patient is felt to have aspirated overnight. Started on zosyn  - Sputum culture with Pseudomonas sensitive to zosyn. Will continue for 7 days   Will DC after today  1 blood culture positive for coag negative staph which is felt to be a contaminant

## 2022-05-17 NOTE — PROGRESS NOTES
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 male with wounds to sacral, left lateral lower leg, right anterior foot, right hand, left posterior heel, and right lateral and medial foot. Wound on left heel has exposed bone, right lower leg has exposed tendon, and sacral has improved since debridement last week. Initiate NPWT to sacral on Thursday. Santyl to wounds on lower extremities. Bedside debridement today.     Review of Systems   Unable to perform ROS: Acuity of condition     Objective:     Vital Signs (Most Recent):  Temp: 99.8 °F (37.7 °C) (05/17/22 1106)  Pulse: 95 (05/17/22 1115)  Resp: (!) 31 (05/17/22 1115)  BP: (!) 142/52 (05/17/22 1115)  SpO2: 100 % (05/17/22 1115) Vital Signs (24h Range):  Temp:  [97.7 °F (36.5 °C)-100.1 °F (37.8 °C)] 99.8 °F (37.7 °C)  Pulse:  [] 95  Resp:  [8-37] 31  SpO2:  [96 %-100 %] 100 %  BP: ()/(35-66) 142/52     Weight: 77.2 kg (170 lb 3.1 oz)  Body mass index is 23.74 kg/m².  No data recorded    Physical Exam  Vitals reviewed.   Constitutional:       Appearance: Normal appearance.   HENT:      Head: Normocephalic.   Cardiovascular:      Rate and Rhythm: Regular rhythm. Tachycardia present.   Pulmonary:      Effort: Pulmonary effort is normal.   Musculoskeletal:         General: Tenderness present.   Skin:     Findings: Erythema present.      Comments: Wound, see LDA for measurements and pictures   Neurological:      Mental Status: He is alert. Mental status is at baseline.      Motor: Weakness present.            Altered Skin  Integrity 01/20/22 1000 Right anterior Foot Purple or maroon localized area of discolored intact skin or non-intact skin or a blood-filled blister. (Active)   01/20/22 1000   Altered Skin Integrity Present on Admission: suspected hospital acquired   Side: Right   Orientation: anterior   Location: Foot   Wound Number:    Is this injury device related?:    Primary Wound Type:    Description of Altered Skin Integrity: Purple or maroon localized area of discolored intact skin or non-intact skin or a blood-filled blister.   Removal Indication and Assessment:    Wound Outcome:    (Retired) Wound Length (cm):    (Retired) Wound Width (cm):    (Retired) Depth (cm):    Wound Description (Comments):    Removal Indications:    Wound Image     05/12/22 1200   Description of Altered Skin Integrity Full thickness tissue loss. Base is covered by slough and/or eschar in the wound bed 05/13/22 1300   Dressing Appearance Dry;Intact;Clean 05/15/22 2345   Drainage Amount Scant 05/15/22 1200   Drainage Characteristics/Odor Chung;No odor 05/15/22 1200   Appearance Dressing in place, unable to visualize 05/17/22 0735   Tissue loss description Not applicable 05/13/22 1300   Black (%), Wound Tissue Color 0 % 05/12/22 1200   Red (%), Wound Tissue Color 5 % 05/12/22 1200   Yellow (%), Wound Tissue Color 95 % 05/12/22 1200   Periwound Area Moist 05/13/22 1300   Wound Edges Defined;Open 05/13/22 1300   Wound Length (cm) 2 cm 05/12/22 1200   Wound Width (cm) 1 cm 05/12/22 1200   Wound Depth (cm) 0.2 cm 05/12/22 1200   Wound Volume (cm^3) 0.4 cm^3 05/12/22 1200   Wound Surface Area (cm^2) 2 cm^2 05/12/22 1200   Care Cleansed with:;Soap and water;Wound cleanser;Applied:;Skin Barrier;Moisturizing agent 05/15/22 1200   Dressing Gauze, wet to moist;Silver;Other (comment) 05/15/22 1200   Periwound Care Moisture barrier applied 05/15/22 1200   Dressing Change Due 05/14/22 05/13/22 1300            Altered Skin Integrity 01/20/22 1000 Right lateral Foot  Purple or maroon localized area of discolored intact skin or non-intact skin or a blood-filled blister. (Active)   01/20/22 1000   Altered Skin Integrity Present on Admission: suspected hospital acquired   Side: Right   Orientation: lateral   Location: Foot   Wound Number:    Is this injury device related?:    Primary Wound Type:    Description of Altered Skin Integrity: Purple or maroon localized area of discolored intact skin or non-intact skin or a blood-filled blister.   Removal Indication and Assessment:    Wound Outcome:    (Retired) Wound Length (cm):    (Retired) Wound Width (cm):    (Retired) Depth (cm):    Wound Description (Comments):    Removal Indications:    Wound Image    05/12/22 1200   Description of Altered Skin Integrity Full thickness tissue loss. Subcutaneous fat may be visible but bone, tendon or muscle are not exposed 05/13/22 1300   Dressing Appearance Dry 05/15/22 2345   Drainage Amount Scant 05/15/22 1200   Drainage Characteristics/Odor Chung;No odor 05/15/22 1200   Appearance Dressing in place, unable to visualize 05/17/22 0735   Tissue loss description Not applicable 05/13/22 1300   Black (%), Wound Tissue Color 0 % 05/12/22 1200   Red (%), Wound Tissue Color 30 % 05/12/22 1200   Yellow (%), Wound Tissue Color 70 % 05/12/22 1200   Periwound Area Moist 05/13/22 1300   Wound Edges Defined;Open 05/13/22 1300   Wound Length (cm) 3 cm 05/12/22 1200   Wound Width (cm) 4.5 cm 05/12/22 1200   Wound Depth (cm) 1 cm 05/12/22 1200   Wound Volume (cm^3) 13.5 cm^3 05/12/22 1200   Wound Surface Area (cm^2) 13.5 cm^2 05/12/22 1200   Care Cleansed with:;Soap and water;Wound cleanser;Skin Barrier;Moisturizing agent 05/15/22 1200   Dressing Removed;Applied;Gauze, wet to moist;Silver;Gauze 05/15/22 1200   Periwound Care Moisture barrier applied 05/15/22 1200   Dressing Change Due 05/14/22 05/13/22 1300            Altered Skin Integrity 02/08/22 1300 Left posterior Heel Other (comment) Full thickness tissue  loss. Base is covered by slough and/or eschar in the wound bed (Active)   02/08/22 1300   Altered Skin Integrity Present on Admission: yes   Side: Left   Orientation: posterior   Location: Heel   Wound Number:    Is this injury device related?: No   Primary Wound Type: Other   Description of Altered Skin Integrity: Full thickness tissue loss. Base is covered by slough and/or eschar in the wound bed   Removal Indication and Assessment:    Wound Outcome:    (Retired) Wound Length (cm):    (Retired) Wound Width (cm):    (Retired) Depth (cm):    Wound Description (Comments):    Removal Indications:    Wound Image     05/12/22 1200   Description of Altered Skin Integrity Full thickness tissue loss. Base is covered by slough and/or eschar in the wound bed 05/13/22 1300   Dressing Appearance Intact 05/15/22 2345   Drainage Amount Moderate 05/15/22 1200   Drainage Characteristics/Odor Chung 05/15/22 1200   Appearance Dressing in place, unable to visualize 05/17/22 0735   Tissue loss description Not applicable 05/13/22 1300   Black (%), Wound Tissue Color 0 % 05/12/22 1200   Red (%), Wound Tissue Color 50 % 05/12/22 1200   Yellow (%), Wound Tissue Color 50 % 05/12/22 1200   Periwound Area Moist 05/13/22 1300   Wound Edges Defined;Open 05/13/22 1300   Wound Length (cm) 7 cm 05/12/22 1200   Wound Width (cm) 6.5 cm 05/12/22 1200   Wound Depth (cm) 0.5 cm 05/12/22 1200   Wound Volume (cm^3) 22.75 cm^3 05/12/22 1200   Wound Surface Area (cm^2) 45.5 cm^2 05/12/22 1200   Care Cleansed with:;Soap and water;Wound cleanser;Applied:;Skin Barrier;Moisturizing agent 05/15/22 1200   Dressing Removed;Applied;Gauze, wet to moist;Silver;Gauze;Rolled gauze 05/15/22 1200   Periwound Care Moisturizer applied 05/15/22 1200   Dressing Change Due 05/14/22 05/13/22 1300            Altered Skin Integrity 02/15/22 1300 Left lateral Leg Traumatic Partial thickness tissue loss. Shallow open ulcer with a red or pink wound bed, without slough. Intact or  Open/Ruptured Serum-filled blister. (Active)   02/15/22 1300   Altered Skin Integrity Present on Admission: suspected hospital acquired   Side: Left   Orientation: lateral   Location: Leg   Wound Number:    Is this injury device related?: No   Primary Wound Type: Traumatic   Description of Altered Skin Integrity: Partial thickness tissue loss. Shallow open ulcer with a red or pink wound bed, without slough. Intact or Open/Ruptured Serum-filled blister.   Removal Indication and Assessment:    Wound Outcome:    (Retired) Wound Length (cm):    (Retired) Wound Width (cm):    (Retired) Depth (cm):    Wound Description (Comments):    Removal Indications:    Wound Image    05/12/22 1200   Description of Altered Skin Integrity Full thickness tissue loss. Base is covered by slough and/or eschar in the wound bed 04/26/22 1235   Dressing Appearance Intact 05/15/22 2345   Drainage Amount None 05/15/22 1200   Appearance Dressing in place, unable to visualize 05/17/22 0735   Tissue loss description Not applicable 05/13/22 1300   Black (%), Wound Tissue Color 10 % 04/26/22 1235   Red (%), Wound Tissue Color 0 % 04/26/22 1235   Yellow (%), Wound Tissue Color 0 % 04/26/22 1235   Periwound Area Dry 05/12/22 1200   Wound Edges Defined 05/12/22 1200   Care Cleansed with:;Soap and water;Wound cleanser;Skin Barrier;Moisturizing agent 05/15/22 1200   Dressing Removed;Applied;Changed 05/08/22 1200   Dressing Change Due 05/09/22 05/08/22 1200            Altered Skin Integrity 02/28/22 1300 Left anterior Foot Traumatic Partial thickness tissue loss. Shallow open ulcer with a red or pink wound bed, without slough. Intact or Open/Ruptured Serum-filled blister. (Active)   02/28/22 1300   Altered Skin Integrity Present on Admission: suspected hospital acquired   Side: Left   Orientation: anterior   Location: Foot   Wound Number:    Is this injury device related?: No   Primary Wound Type: Traumatic   Description of Altered Skin Integrity: Partial  thickness tissue loss. Shallow open ulcer with a red or pink wound bed, without slough. Intact or Open/Ruptured Serum-filled blister.   Removal Indication and Assessment:    Wound Outcome:    (Retired) Wound Length (cm):    (Retired) Wound Width (cm):    (Retired) Depth (cm):    Wound Description (Comments):    Removal Indications:    Wound Image    05/12/22 1200   Description of Altered Skin Integrity Partial thickness tissue loss. Shallow open ulcer with a red or pink wound bed, without slough. Intact or Open/Ruptured Serum-filled blister. 05/12/22 1200   Dressing Appearance Intact 05/15/22 2345   Drainage Amount None 05/15/22 1200   Drainage Characteristics/Odor Serous 05/10/22 0742   Appearance Dressing in place, unable to visualize 05/17/22 0735   Tissue loss description Partial thickness 05/04/22 1300   Black (%), Wound Tissue Color 0 % 05/04/22 1300   Red (%), Wound Tissue Color 99 % 05/04/22 1300   Yellow (%), Wound Tissue Color 0 % 05/04/22 1300   Periwound Area Dry 05/13/22 1300   Wound Edges Defined 05/13/22 1300   Care Cleansed with:;Soap and water;Wound cleanser;Skin Barrier;Moisturizing agent;Other (see comments) 05/15/22 1200   Dressing Applied;Rolled gauze;Gauze 05/15/22 1200   Dressing Change Due 05/09/22 05/08/22 1200            Altered Skin Integrity 03/15/22 1000 Right lateral Malleolus Ulceration Purple or maroon localized area of discolored intact skin or non-intact skin or a blood-filled blister. (Active)   03/15/22 1000   Altered Skin Integrity Present on Admission: suspected hospital acquired   Side: Right   Orientation: lateral   Location: Malleolus   Wound Number:    Is this injury device related?: Yes   Primary Wound Type: Ulceration   Description of Altered Skin Integrity: Purple or maroon localized area of discolored intact skin or non-intact skin or a blood-filled blister.   Removal Indication and Assessment:    Wound Outcome:    (Retired) Wound Length (cm):    (Retired) Wound Width  (cm):    (Retired) Depth (cm):    Wound Description (Comments):    Removal Indications:    Wound Image    05/12/22 1200   Description of Altered Skin Integrity Full thickness tissue loss. Subcutaneous fat may be visible but bone, tendon or muscle are not exposed 05/13/22 1300   Dressing Appearance Moist drainage 05/15/22 1200   Drainage Amount Moderate 05/15/22 1200   Drainage Characteristics/Odor Chung;No odor 05/15/22 1200   Appearance Dressing in place, unable to visualize 05/17/22 0735   Tissue loss description Full thickness 05/12/22 1200   Black (%), Wound Tissue Color 0 % 05/12/22 1200   Red (%), Wound Tissue Color 90 % 05/12/22 1200   Yellow (%), Wound Tissue Color 10 % 05/12/22 1200   Periwound Area Moist 05/15/22 1200   Wound Edges Defined;Open 05/13/22 1300   Wound Length (cm) 3.5 cm 05/12/22 1200   Wound Width (cm) 3 cm 05/12/22 1200   Wound Depth (cm) 0.2 cm 05/12/22 1200   Wound Volume (cm^3) 2.1 cm^3 05/12/22 1200   Wound Surface Area (cm^2) 10.5 cm^2 05/12/22 1200   Care Cleansed with:;Soap and water;Wound cleanser;Applied:;Skin Barrier;Moisturizing agent 05/15/22 1200   Dressing Removed;Applied;Gauze, wet to moist;Silver;Gauze;Rolled gauze;Other (comment) 05/15/22 1200   Periwound Care Moisture barrier applied 05/15/22 1200   Dressing Change Due 05/14/22 05/13/22 1300            Altered Skin Integrity 03/15/22 1000 Right lateral Leg Ulceration Purple or maroon localized area of discolored intact skin or non-intact skin or a blood-filled blister. (Active)   03/15/22 1000   Altered Skin Integrity Present on Admission: suspected hospital acquired   Side: Right   Orientation: lateral   Location: Leg   Wound Number:    Is this injury device related?: No   Primary Wound Type: Ulceration   Description of Altered Skin Integrity: Purple or maroon localized area of discolored intact skin or non-intact skin or a blood-filled blister.   Removal Indication and Assessment:    Wound Outcome:    (Retired) Wound  Length (cm):    (Retired) Wound Width (cm):    (Retired) Depth (cm):    Wound Description (Comments):    Removal Indications:    Wound Image    05/12/22 1200   Description of Altered Skin Integrity Full thickness tissue loss with exposed bone, tendon, or muscle. Often includes undermining and tunneling. May extend into muscle and/or supporting structures. 05/13/22 1300   Dressing Appearance Moist drainage 05/15/22 1200   Drainage Amount Moderate 05/15/22 1200   Drainage Characteristics/Odor Chung 05/15/22 1200   Appearance Dressing in place, unable to visualize 05/17/22 0735   Tissue loss description Full thickness 05/13/22 1300   Black (%), Wound Tissue Color 0 % 05/12/22 1200   Red (%), Wound Tissue Color 90 % 05/12/22 1200   Yellow (%), Wound Tissue Color 10 % 05/12/22 1200   Periwound Area Moist 05/13/22 1300   Wound Edges Defined;Open 05/13/22 1300   Wound Length (cm) 10 cm 05/12/22 1200   Wound Width (cm) 4.5 cm 05/12/22 1200   Wound Depth (cm) 0.5 cm 05/12/22 1200   Wound Volume (cm^3) 22.5 cm^3 05/12/22 1200   Wound Surface Area (cm^2) 45 cm^2 05/12/22 1200   Care Cleansed with:;Soap and water;Wound cleanser;Applied:;Skin Barrier;Moisturizing agent;Other (see comments) 05/15/22 1200   Dressing Removed;Applied;Gauze, wet to moist;Silver;Gauze;Rolled gauze 05/15/22 1200   Periwound Care Moisture barrier applied 05/15/22 1200   Dressing Change Due 05/14/22 05/13/22 1300            Altered Skin Integrity 03/22/22 1300 posterior Sacral spine Other (comment) Full thickness tissue loss. Base is covered by slough and/or eschar in the wound bed (Active)   03/22/22 1300   Altered Skin Integrity Present on Admission: yes   Side:    Orientation: posterior   Location: Sacral spine   Wound Number:    Is this injury device related?: No   Primary Wound Type: Other   Description of Altered Skin Integrity: Full thickness tissue loss. Base is covered by slough and/or eschar in the wound bed   Removal Indication and Assessment:     Wound Outcome:    (Retired) Wound Length (cm):    (Retired) Wound Width (cm):    (Retired) Depth (cm):    Wound Description (Comments):    Removal Indications:    Wound Image    05/12/22 1200   Description of Altered Skin Integrity Full thickness tissue loss with exposed bone, tendon, or muscle. Often includes undermining and tunneling. May extend into muscle and/or supporting structures. 05/16/22 1615   Dressing Appearance Moist drainage 05/16/22 1615   Drainage Amount Moderate 05/16/22 1615   Drainage Characteristics/Odor Chung 05/16/22 1615   Appearance Dressing in place, unable to visualize 05/17/22 0735   Tissue loss description Full thickness 05/16/22 1615   Black (%), Wound Tissue Color 0 % 05/12/22 1200   Red (%), Wound Tissue Color 70 % 05/12/22 1200   Yellow (%), Wound Tissue Color 30 % 05/12/22 1200   Periwound Area Hemosiderin Staining 05/16/22 1615   Wound Edges Open 05/16/22 1615   Wound Length (cm) 15 cm 05/12/22 1200   Wound Width (cm) 15 cm 05/12/22 1200   Wound Depth (cm) 3 cm 05/12/22 1200   Wound Volume (cm^3) 675 cm^3 05/12/22 1200   Wound Surface Area (cm^2) 225 cm^2 05/12/22 1200   Care Cleansed with:;Soap and water;Wound cleanser;Applied:;Skin Barrier 05/17/22 0200   Dressing Removed;Changed;Gauze, wet to moist;Silver;Gauze;Rolled gauze 05/17/22 0200   Packing packed with;gauze, iodoform;packing removed 05/17/22 0200   Periwound Care Moisture barrier applied 05/16/22 1615   Dressing Change Due 05/17/22 05/16/22 1615            Altered Skin Integrity 04/05/22 1130 Right anterior Toe, second Traumatic (Active)   04/05/22 1130   Altered Skin Integrity Present on Admission: suspected hospital acquired   Side: Right   Orientation: anterior   Location: Toe, second   Wound Number:    Is this injury device related?: No   Primary Wound Type: Traumatic   Description of Altered Skin Integrity:    Removal Indication and Assessment:    Wound Outcome:    (Retired) Wound Length (cm):    (Retired) Wound  Width (cm):    (Retired) Depth (cm):    Wound Description (Comments):    Removal Indications:    Wound Image    05/12/22 1200   Dressing Appearance Open to air 05/15/22 2015   Drainage Amount None 05/15/22 1200   Drainage Characteristics/Odor Creamy 04/17/22 0800   Appearance Dressing in place, unable to visualize 05/17/22 0735   Tissue loss description Not applicable 05/13/22 1300   Wound Edges Defined 05/13/22 1300   Care Cleansed with:;Soap and water;Wound cleanser;Applied:;Skin Barrier;Moisturizing agent 05/15/22 1200   Dressing Applied 05/15/22 1200            Altered Skin Integrity 04/07/22 1130 posterior Buttocks Incontinence associated dermatitis (Active)   04/07/22 1130   Altered Skin Integrity Present on Admission: suspected hospital acquired   Side:    Orientation: posterior   Location: Buttocks   Wound Number:    Is this injury device related?: No   Primary Wound Type: Incontinence   Description of Altered Skin Integrity:    Removal Indication and Assessment:    Wound Outcome:    (Retired) Wound Length (cm):    (Retired) Wound Width (cm):    (Retired) Depth (cm):    Wound Description (Comments):    Removal Indications:    Wound Image    05/12/22 1200   Description of Altered Skin Integrity Partial thickness tissue loss. Shallow open ulcer with a red or pink wound bed, without slough. Intact or Open/Ruptured Serum-filled blister. 05/03/22 1245   Dressing Appearance Open to air 05/15/22 2015   Drainage Amount None 05/15/22 1200   Drainage Characteristics/Odor No odor 05/13/22 1300   Appearance Intact;Pink 05/17/22 0735   Tissue loss description Partial thickness 05/12/22 1200   Black (%), Wound Tissue Color 0 % 05/12/22 1200   Red (%), Wound Tissue Color 99 % 05/12/22 1200   Yellow (%), Wound Tissue Color 0 % 05/12/22 1200   Periwound Area Excoriated;Moist 05/13/22 1300   Wound Edges Defined 05/13/22 1300   Care Cleansed with:;Soap and water;Wound cleanser;Applied:;Skin Barrier;Moisturizing agent 05/15/22  1200   Dressing Removed;Applied;Changed 05/15/22 1200   Packing packed with 05/07/22 2200   Periwound Care Moisturizer applied;Moisture barrier applied 05/15/22 1200   Dressing Change Due 05/09/22 05/08/22 1200            Altered Skin Integrity 04/21/22 1200 Right medial Toe, fifth Ulceration (Active)   04/21/22 1200   Altered Skin Integrity Present on Admission: suspected hospital acquired   Side: Right   Orientation: medial   Location: Toe, fifth   Wound Number:    Is this injury device related?: No   Primary Wound Type: Ulceration   Description of Altered Skin Integrity:    Removal Indication and Assessment:    Wound Outcome:    (Retired) Wound Length (cm):    (Retired) Wound Width (cm):    (Retired) Depth (cm):    Wound Description (Comments):    Removal Indications:    Wound Image    04/26/22 1235   Description of Altered Skin Integrity Full thickness tissue loss. Subcutaneous fat may be visible but bone, tendon or muscle are not exposed 05/13/22 1300   Dressing Appearance Intact 05/15/22 2015   Drainage Amount Scant 05/15/22 1200   Drainage Characteristics/Odor Chung 05/15/22 1200   Appearance Dressing in place, unable to visualize 05/17/22 0735   Tissue loss description Not applicable 05/13/22 1300   Black (%), Wound Tissue Color 0 % 04/26/22 1235   Red (%), Wound Tissue Color 0 % 04/26/22 1235   Yellow (%), Wound Tissue Color 100 % 04/26/22 1235   Periwound Area Moist 05/13/22 1300   Wound Edges Defined 05/13/22 1300   Wound Length (cm) 0.6 cm 04/26/22 1235   Wound Width (cm) 0.6 cm 04/26/22 1235   Wound Depth (cm) 0.1 cm 04/26/22 1235   Wound Volume (cm^3) 0.036 cm^3 04/26/22 1235   Wound Surface Area (cm^2) 0.36 cm^2 04/26/22 1235   Care Cleansed with:;Soap and water;Wound cleanser;Applied:;Skin Barrier 05/15/22 1200   Dressing Applied;Silver;Gauze 05/15/22 1200   Dressing Change Due 05/14/22 05/13/22 1300            Incision/Site 01/04/22 1444 Neck (Active)   01/04/22 1444   Present Prior to Hospital  Arrival?: No   Side:    Location: Neck   Orientation:    Incision Type:    Closure Method:    Additional Comments:    Removal Indication and Assessment:    Wound Outcome:    Removal Indications:    Incision WDL WDL 05/17/22 0735   Dressing Appearance Open to air 05/15/22 2345   Drainage Amount None 05/15/22 1200       [REMOVED]      Negative Pressure Wound Therapy  12/14/21 midline;lower (Removed)   12/14/21    Side:    Orientation: midline;lower   Location: Lower quadrant   Additional Comments:    Removal Indication and Assessment: removed by previous caregiver   Location:    SDO Location:    Removed 02/16/22 0800   NPWT Type Vacuum Therapy 02/15/22 0600   Therapy Setting NPWT Vacuum off 02/15/22 1300   Pressure Setting NPWT 125 mmHg 02/15/22 0600   Therapy Interventions NPWT Seal intact 02/15/22 0600   Sponges Inserted NPWT Black 02/15/22 0600   Sponges Removed NPWT Black;White;1 02/10/22 1300   General Output (mL) 300 02/10/22 0930       [REMOVED]      Negative Pressure Wound Therapy  02/21/22 Right lower (Removed)   02/21/22    Side: Right   Orientation: lower   Location: Lower quadrant   Additional Comments:    Removal Indication and Assessment: No Longer Indicated   Location:    SDO Location:    Removed 02/22/22    NPWT Type Vacuum Therapy 02/22/22 0400   Therapy Setting NPWT Continuous therapy 02/22/22 0400   Pressure Setting NPWT other (see comments) 02/22/22 0400   Therapy Interventions NPWT Seal intact 02/22/22 0400   General Output (mL) 0 02/21/22 2340       [REMOVED]      Altered Skin Integrity Left lateral Thigh Purple or maroon localized area of discolored intact skin or non-intact skin or a blood-filled blister. (Removed)       Altered Skin Integrity Present on Admission:    Side: Left   Orientation: lateral   Location: Thigh   Wound Number:    Is this injury device related?:    Primary Wound Type:    Description of Altered Skin Integrity: Purple or maroon localized area of discolored intact skin or  non-intact skin or a blood-filled blister.   Removal Indication and Assessment:    Wound Outcome: Healed   (Retired) Wound Length (cm):    (Retired) Wound Width (cm):    (Retired) Depth (cm):    Wound Description (Comments):    Removal Indications:    Removed 03/15/22 1000   Wound Image    03/15/22 1000   Description of Altered Skin Integrity Purple or maroon localized area of discolored intact skin or non-intact skin or a blood-filled blister. 02/13/22 0730   Dressing Appearance Open to air;Dry;Intact 03/15/22 1000   Drainage Amount None 03/15/22 1000   Appearance Dry 03/15/22 1000   Care Cleansed with:;Soap and water 12/28/21 1521       [REMOVED]      Altered Skin Integrity 01/27/22 1045 Right dorsal Hand Other (comment) Full thickness tissue loss. Base is covered by slough and/or eschar in the wound bed (Removed)   01/27/22 1045   Altered Skin Integrity Present on Admission: suspected hospital acquired   Side: Right   Orientation: dorsal   Location: Hand   Wound Number:    Is this injury device related?: No   Primary Wound Type: Other   Description of Altered Skin Integrity: Full thickness tissue loss. Base is covered by slough and/or eschar in the wound bed   Removal Indication and Assessment:    Wound Outcome:    (Retired) Wound Length (cm):    (Retired) Wound Width (cm):    (Retired) Depth (cm):    Wound Description (Comments):    Removal Indications:    Removed 04/05/22 1130   Wound Image    04/05/22 1130   Description of Altered Skin Integrity Partial thickness tissue loss. Shallow open ulcer with a red or pink wound bed, without slough. Intact or Open/Ruptured Serum-filled blister. 02/28/22 1300   Dressing Appearance Open to air;Dry;Intact 03/21/22 1300   Drainage Amount None 03/21/22 1300   Drainage Characteristics/Odor Serosanguineous;No odor 03/01/22 1330   Appearance Pink;Dry 04/05/22 1130   Tissue loss description Not applicable 04/05/22 1130   Black (%), Wound Tissue Color 99 % 02/07/22 1300   Red  (%), Wound Tissue Color 95 % 02/24/22 1330   Yellow (%), Wound Tissue Color 5 % 02/24/22 1330   Periwound Area Dry;Intact 03/21/22 1300   Wound Edges Defined 03/21/22 1300   Wound Length (cm) 3.5 cm 02/07/22 1300   Wound Width (cm) 2 cm 02/07/22 1300   Wound Depth (cm) 0 cm 02/07/22 1300   Wound Volume (cm^3) 0 cm^3 02/07/22 1300   Wound Surface Area (cm^2) 7 cm^2 02/07/22 1300   Care Cleansed with:;Soap and water 03/21/22 1300   Dressing Applied;Other (comment) 03/02/22 1300   Dressing Change Due 03/02/22 03/01/22 1330       [REMOVED]      Altered Skin Integrity 02/08/22 1300 Right lateral Malleolus Traumatic Partial thickness tissue loss. Shallow open ulcer with a red or pink wound bed, without slough. Intact or Open/Ruptured Serum-filled blister. (Removed)   02/08/22 1300   Altered Skin Integrity Present on Admission: suspected hospital acquired   Side: Right   Orientation: lateral   Location: Malleolus   Wound Number:    Is this injury device related?: No   Primary Wound Type: Traumatic   Description of Altered Skin Integrity: Partial thickness tissue loss. Shallow open ulcer with a red or pink wound bed, without slough. Intact or Open/Ruptured Serum-filled blister.   Removal Indication and Assessment: site change   Wound Outcome:    (Retired) Wound Length (cm):    (Retired) Wound Width (cm):    (Retired) Depth (cm):    Wound Description (Comments):    Removal Indications:    Removed 03/15/22 1000   Wound Image    03/07/22 1300   Description of Altered Skin Integrity Partial thickness tissue loss. Shallow open ulcer with a red or pink wound bed, without slough. Intact or Open/Ruptured Serum-filled blister. 03/07/22 1300   Dressing Appearance Dry 03/14/22 0945   Drainage Amount None 03/12/22 1621   Drainage Characteristics/Odor Brown;No odor 03/09/22 1250   Appearance Dressing in place, unable to visualize;Intact 03/13/22 2000   Tissue loss description Not applicable 03/09/22 1250   Periwound Area Dry 03/14/22  0945   Wound Edges Defined 03/14/22 0945   Care Cleansed with:;Soap and water 03/16/22 1100   Dressing Applied;Silver 03/16/22 1100   Dressing Change Due 03/11/22 03/10/22 1100       [REMOVED]      Altered Skin Integrity 02/08/22 1300 Right posterior Achilles Traumatic Purple or maroon localized area of discolored intact skin or non-intact skin or a blood-filled blister. (Removed)   02/08/22 1300   Altered Skin Integrity Present on Admission: suspected hospital acquired   Side: Right   Orientation: posterior   Location: Achilles   Wound Number:    Is this injury device related?: No   Primary Wound Type: Traumatic   Description of Altered Skin Integrity: Purple or maroon localized area of discolored intact skin or non-intact skin or a blood-filled blister.   Removal Indication and Assessment:    Wound Outcome: Healed   (Retired) Wound Length (cm):    (Retired) Wound Width (cm):    (Retired) Depth (cm):    Wound Description (Comments):    Removal Indications:    Removed 03/15/22 1000   Wound Image    03/15/22 1000   Description of Altered Skin Integrity Purple or maroon localized area of discolored intact skin or non-intact skin or a blood-filled blister. 03/07/22 1300   Dressing Appearance Dry;Open to air;Intact 03/15/22 1000   Drainage Amount None 03/15/22 1000   Appearance Dressing in place, unable to visualize;Intact 03/13/22 2000   Tissue loss description Not applicable 03/15/22 1000   Wound Edges Defined 03/15/22 1000   Care Cleansed with:;Soap and water 03/15/22 1000   Dressing Changed 02/21/22 2340       [REMOVED]      Altered Skin Integrity 02/15/22 1300 Right anterior Foot Traumatic Partial thickness tissue loss. Shallow open ulcer with a red or pink wound bed, without slough. Intact or Open/Ruptured Serum-filled blister. (Removed)   02/15/22 1300   Altered Skin Integrity Present on Admission: suspected hospital acquired   Side: Right   Orientation: anterior   Location: Foot   Wound Number:    Is this injury  device related?: No   Primary Wound Type: Traumatic   Description of Altered Skin Integrity: Partial thickness tissue loss. Shallow open ulcer with a red or pink wound bed, without slough. Intact or Open/Ruptured Serum-filled blister.   Removal Indication and Assessment:    Wound Outcome: Healed   (Retired) Wound Length (cm):    (Retired) Wound Width (cm):    (Retired) Depth (cm):    Wound Description (Comments):    Removal Indications:    Removed 05/03/22 1300   Wound Image    04/26/22 1235   Description of Altered Skin Integrity Purple or maroon localized area of discolored intact skin or non-intact skin or a blood-filled blister. 03/15/22 1000   Dressing Appearance Dry 05/03/22 1245   Drainage Amount None 05/03/22 1245   Drainage Characteristics/Odor Clear 05/01/22 0720   Appearance Dressing in place, unable to visualize 05/03/22 2000   Tissue loss description Not applicable 05/03/22 1245   Black (%), Wound Tissue Color 99 % 03/15/22 1000   Red (%), Wound Tissue Color 99 % 02/15/22 1300   Periwound Area Dry 05/03/22 1245   Wound Edges Defined 05/03/22 1245   Care Cleansed with:;Soap and water 05/03/22 1245   Dressing Changed 05/01/22 1800   Dressing Change Due 04/26/22 04/25/22 1700       [REMOVED]      Altered Skin Integrity 03/07/22 1300 Left anterior Greater trochanter Moisture associated dermatitis (Removed)   03/07/22 1300   Altered Skin Integrity Present on Admission:    Side: Left   Orientation: anterior   Location: Greater trochanter   Wound Number:    Is this injury device related?: No   Primary Wound Type: Moisture ass   Description of Altered Skin Integrity:    Removal Indication and Assessment:    Wound Outcome: Healed   (Retired) Wound Length (cm):    (Retired) Wound Width (cm):    (Retired) Depth (cm):    Wound Description (Comments):    Removal Indications:    Removed 05/03/22 1300   Wound Image    04/21/22 1200   Dressing Appearance Open to air;Dry;Intact 05/03/22 1245   Drainage Amount None  05/03/22 1245   Appearance Dressing in place, unable to visualize 05/03/22 2000   Tissue loss description Not applicable 04/26/22 1235   Periwound Area Dry 05/03/22 1245   Wound Edges Defined 05/03/22 1245   Care Cleansed with:;Soap and water 05/03/22 1245       [REMOVED]      Altered Skin Integrity 03/07/22 1300 Right anterior Toe, fourth Ulceration Partial thickness tissue loss. Shallow open ulcer with a red or pink wound bed, without slough. Intact or Open/Ruptured Serum-filled blister. (Removed)   03/07/22 1300   Altered Skin Integrity Present on Admission: yes   Side: Right   Orientation: anterior   Location: Toe, fourth   Wound Number:    Is this injury device related?: No   Primary Wound Type: Ulceration   Description of Altered Skin Integrity: Partial thickness tissue loss. Shallow open ulcer with a red or pink wound bed, without slough. Intact or Open/Ruptured Serum-filled blister.   Removal Indication and Assessment:    Wound Outcome:    (Retired) Wound Length (cm):    (Retired) Wound Width (cm):    (Retired) Depth (cm):    Wound Description (Comments):    Removal Indications:    Removed 04/13/22 1030   Wound Image    04/05/22 1130   Description of Altered Skin Integrity Full thickness tissue loss. Base is covered by slough and/or eschar in the wound bed 04/05/22 1130   Dressing Appearance Dry;Intact 04/12/22 0715   Drainage Amount None 04/12/22 0715   Drainage Characteristics/Odor No odor 04/11/22 1000   Appearance Black;Dry 04/11/22 1000   Tissue loss description Not applicable 04/11/22 1000   Black (%), Wound Tissue Color 100 % 04/05/22 1130   Red (%), Wound Tissue Color 0 % 04/05/22 1130   Yellow (%), Wound Tissue Color 0 % 04/05/22 1130   Periwound Area Dry 04/11/22 1000   Wound Edges Defined 04/11/22 1000   Care Cleansed with:;Soap and water 04/11/22 1000   Dressing Removed;Applied;Changed;Hydrocolloid 04/07/22 1145   Dressing Change Due 04/11/22 04/07/22 1145       [REMOVED]      Altered Skin  Integrity 04/19/22 1025 Left lateral Lower quadrant Skin Tear (Removed)   04/19/22 1025   Altered Skin Integrity Present on Admission: suspected hospital acquired   Side: Left   Orientation: lateral   Location: Lower quadrant   Wound Number:    Is this injury device related?: No   Primary Wound Type: Skin tear   Description of Altered Skin Integrity:    Removal Indication and Assessment:    Wound Outcome: Healed   (Retired) Wound Length (cm):    (Retired) Wound Width (cm):    (Retired) Depth (cm):    Wound Description (Comments):    Removal Indications:    Removed 05/03/22 1300   Wound Image    05/03/22 1245   Description of Altered Skin Integrity Partial thickness tissue loss. Shallow open ulcer with a red or pink wound bed, without slough. Intact or Open/Ruptured Serum-filled blister. 04/26/22 1235   Dressing Appearance Dry;Open to air;Intact 05/03/22 1245   Drainage Amount None 05/03/22 1245   Drainage Characteristics/Odor Serosanguineous;No odor 04/26/22 1235   Appearance Dry 05/03/22 2000   Tissue loss description Partial thickness 05/03/22 1245   Black (%), Wound Tissue Color 0 % 05/03/22 1245   Red (%), Wound Tissue Color 100 % 05/03/22 1245   Yellow (%), Wound Tissue Color 0 % 05/03/22 1245   Periwound Area Dry 05/03/22 1245   Wound Edges Defined 05/03/22 1245   Wound Length (cm) 2 cm 04/19/22 1025   Wound Width (cm) 1 cm 04/19/22 1025   Wound Depth (cm) 0 cm 04/19/22 1025   Wound Volume (cm^3) 0 cm^3 04/19/22 1025   Wound Surface Area (cm^2) 2 cm^2 04/19/22 1025   Care Cleansed with:;Soap and water;Moisturizing agent 05/03/22 1245   Dressing Foam 05/02/22 1300       [REMOVED]      Wound 12/27/21 1100 Traumatic Left anterior Knee (Removed)   12/27/21 1100    Pre-existing: Yes   Primary Wound Type: Traumatic   Side: Left   Orientation: anterior   Location: Knee   Wound Number:    Ankle-Brachial Index:    Pulses:    Removal Indication and Assessment:    Wound Outcome: Healed   (Retired) Wound Type:     (Retired) Wound Length (cm):    (Retired) Wound Width (cm):    (Retired) Depth (cm):    Wound Description (Comments):    Removal Indications:    Removed 02/28/22 1300   Wound Image    02/24/22 1330   Wound WDL WDL 02/28/22 1300   Dressing Appearance Open to air;Dry;Intact 02/28/22 1300   Drainage Amount None 02/28/22 1300   Appearance Intact 02/28/22 1300   Care Cleansed with:;Soap and water 02/07/22 1300       [REMOVED]      Incision/Site 12/13/21 2317 Perineum (Removed)   12/13/21 2317   Present Prior to Hospital Arrival?:    Side:    Location: Perineum   Orientation:    Incision Type:    Closure Method: Other (see comments)   Additional Comments:    Removal Indication and Assessment: site change   Wound Outcome:    Removal Indications:    Removed 03/22/22 1300   Wound Image    03/15/22 1000   Incision WDL WDL 03/21/22 1300   Dressing Appearance Moist drainage 03/21/22 1300   Drainage Amount Small 03/21/22 1300   Drainage Characteristics/Odor Serosanguineous;No odor 03/21/22 1300   Appearance Dressing in place, unable to visualize;Intact 03/23/22 0000   Black (%), Wound Tissue Color 0 % 03/15/22 1000   Red (%), Wound Tissue Color 99 % 03/15/22 1000   Yellow (%), Wound Tissue Color 0 % 03/15/22 1000   Periwound Area Moist 03/21/22 1300   Wound Edges Defined;Open 03/21/22 1300   Wound Length (cm) 7.5 cm 03/15/22 1000   Wound Width (cm) 11 cm 03/15/22 1000   Wound Depth (cm) 2 cm 03/15/22 1000   Wound Volume (cm^3) 165 cm^3 03/15/22 1000   Wound Surface Area (cm^2) 82.5 cm^2 03/15/22 1000   Care Cleansed with:;Soap and water;Wound cleanser;Applied:;Removed:;Skin Barrier;Moisturizing agent 03/21/22 1300   Dressing Removed;Applied;Changed;Gauze, wet to moist;Gauze;Other (comment) 03/21/22 1300   Packing packing removed 03/20/22 1550   Packing Inserted  1 01/23/22 1800   Packing Removed 1 02/05/22 0730   Periwound Care Moisture barrier applied;Skin barrier film applied 03/21/22 1300   Dressing Change Due 03/22/22  03/21/22 1300       [REMOVED]      Incision/Site 12/27/21 1100 Right Buttocks (Removed)   12/27/21 1100   Present Prior to Hospital Arrival?:    Side: Right   Location: Buttocks   Orientation:    Incision Type:    Closure Method:    Additional Comments:    Removal Indication and Assessment:    Wound Outcome:    Removal Indications:    Removed 12/27/21 1100   Incision WDL WDL 12/26/21 0730   Dressing Appearance Dry;Intact;Dried drainage 12/26/21 0730   Drainage Amount Scant 12/26/21 0730   Drainage Characteristics/Odor Brown 12/26/21 0730   Appearance Dressing in place, unable to visualize 12/27/21 2000   Care Cleansed with:;Antimicrobial agent;Soap and water;Applied:;Other (see comments) 12/26/21 0730   Dressing Removed;Changed;Gauze, wet to dry 12/26/21 0730   Dressing Change Due 12/27/21 12/26/21 1530       [REMOVED]      Incision/Site 12/14/21 1238 Abdomen (Removed)   12/14/21 1238   Present Prior to Hospital Arrival?:    Side:    Location: Abdomen   Orientation:    Incision Type:    Closure Method:    Additional Comments:    Removal Indication and Assessment: site change   Wound Outcome:    Removal Indications:    Removed 02/07/22 1330   Wound Image    12/27/21 1100   Incision WDL WDL 01/27/22 1045   Dressing Appearance Dry;Intact 01/22/22 0010   Drainage Amount Moderate 01/18/22 0730   Drainage Characteristics/Odor Brown 01/09/22 0715   Appearance Chung;Moist 12/29/21 1100   Periwound Area Moist 12/29/21 1100   Wound Edges Defined;Open 12/29/21 1100   Care Cleansed with:;Sterile normal saline;Applied:;Removed:;Skin Barrier 12/28/21 1330   Dressing Removed;Changed;Gauze, wet to dry 01/01/22 0550   Packing packing removed;packed with;gauze, iodoform 01/01/22 0550   Dressing Change Due 01/02/22 01/01/22 0550       [REMOVED]      Incision/Site 01/01/22 0936 Abdomen (Removed)   01/01/22 0936   Present Prior to Hospital Arrival?:    Side:    Location: Abdomen   Orientation:    Incision Type:    Closure Method:     Additional Comments:    Removal Indication and Assessment: site change   Wound Outcome:    Removal Indications:    Removed 02/07/22 1330   Incision WDL ex 01/23/22 0730   Dressing Appearance Intact;Clean 01/23/22 2010   Drainage Amount Moderate 01/23/22 0730   Drainage Characteristics/Odor Sanguineous 01/23/22 0730   Appearance Dressing in place, unable to visualize 01/23/22 0730   Dressing Foam 01/19/22 0730       [REMOVED]      Incision/Site 01/01/22 0956 Right Groin (Removed)   01/01/22 0956   Present Prior to Hospital Arrival?:    Side: Right   Location: Groin   Orientation:    Incision Type:    Closure Method:    Additional Comments:    Removal Indication and Assessment: site change   Wound Outcome:    Removal Indications:    Removed 02/07/22 1300   Incision WDL WDL 01/22/22 0745   Dressing Appearance Open to air 01/19/22 0730   Drainage Amount None 01/19/22 0730   Care Cleansed with:;Soap and water 01/19/22 0730   Dressing Applied 01/14/22 0800       [REMOVED]      Incision/Site 01/04/22 1444 Abdomen (Removed)   01/04/22 1444   Present Prior to Hospital Arrival?:    Side:    Location: Abdomen   Orientation:    Incision Type:    Closure Method:    Additional Comments:    Removal Indication and Assessment: site change   Wound Outcome:    Removal Indications:    Removed 02/07/22 1300   Incision WDL ex 01/19/22 0730   Dressing Appearance Dry;Intact 01/21/22 0730   Drainage Amount Moderate 01/21/22 0730   Drainage Characteristics/Odor Brown;Serosanguineous 01/21/22 0730   Appearance Intact 01/19/22 0730   Dressing Foam 01/19/22 0730       [REMOVED]      Incision/Site 01/04/22 1444 Buttocks (Removed)   01/04/22 1444   Present Prior to Hospital Arrival?:    Side:    Location: Buttocks   Orientation:    Incision Type:    Closure Method:    Additional Comments:    Removal Indication and Assessment: site change   Wound Outcome:    Removal Indications:    Removed 03/22/22 1300   Wound Image    03/15/22 1000   Incision  WDL WDL 03/21/22 1300   Dressing Appearance Moist drainage 03/21/22 1300   Drainage Amount Small 03/21/22 1300   Drainage Characteristics/Odor Serosanguineous;Other (see comments) 03/21/22 1300   Appearance Dressing in place, unable to visualize;Intact 03/23/22 0000   Black (%), Wound Tissue Color 80 % 03/15/22 1000   Red (%), Wound Tissue Color 20 % 03/15/22 1000   Yellow (%), Wound Tissue Color 0 % 03/15/22 1000   Periwound Area Moist 03/21/22 1300   Wound Edges Defined;Open 03/21/22 1300   Wound Length (cm) 10 cm 03/15/22 1000   Wound Width (cm) 6 cm 03/15/22 1000   Wound Depth (cm) 0 cm 03/15/22 1000   Wound Volume (cm^3) 0 cm^3 03/15/22 1000   Wound Surface Area (cm^2) 60 cm^2 03/15/22 1000   Care Cleansed with:;Soap and water;Wound cleanser;Applied:;Removed:;Skin Barrier;Moisturizing agent 03/21/22 1300   Dressing Removed;Applied;Changed;Gauze, wet to moist;Gauze;Other (comment) 03/21/22 1300   Packing packing removed 03/13/22 1300   Periwound Care Moisture barrier applied;Skin barrier film applied 03/21/22 1300   Dressing Change Due 03/22/22 03/21/22 1300       [REMOVED]      Incision/Site 01/07/22 1106 Abdomen (Removed)   01/07/22 1106   Present Prior to Hospital Arrival?:    Side:    Location: Abdomen   Orientation:    Incision Type:    Closure Method:    Additional Comments:    Removal Indication and Assessment: site change   Wound Outcome:    Removal Indications:    Removed 02/07/22 1300   Dressing Appearance Dry;Intact 02/05/22 0730   Drainage Amount None 02/05/22 0730   Drainage Characteristics/Odor Brown;Sanguineous 01/19/22 0730   Dressing Other (comment) 01/10/22 0720       [REMOVED]      Incision/Site 01/10/22 1518 Abdomen (Removed)   01/10/22 1518   Present Prior to Hospital Arrival?:    Side:    Location: Abdomen   Orientation:    Incision Type:    Closure Method:    Additional Comments:    Removal Indication and Assessment:    Wound Outcome: Healed   Removal Indications:    Removed 03/15/22 1000    Wound Image     03/15/22 1000   Incision WDL WDL 03/15/22 1000   Dressing Appearance Moist drainage 03/15/22 1000   Drainage Amount Scant 03/15/22 1000   Drainage Characteristics/Odor Serosanguineous;No odor 03/15/22 1000   Appearance Pink;Hypergranulation;Moist;Dry 03/15/22 1000   Red (%), Wound Tissue Color 30 % 03/15/22 1000   Periwound Area Dry;Moist 03/15/22 1000   Wound Edges Defined 03/15/22 1000   Care Cleansed with:;Soap and water;Applied:;Removed:;Skin Barrier;Moisturizing agent 03/15/22 1000   Dressing Removed;Applied;Changed;Gauze;Non-adherent;Other (comment) 03/15/22 1000   Dressing Change Due 03/18/22 03/15/22 1000       [REMOVED]      Incision/Site 01/12/22 1001 Abdomen (Removed)   01/12/22 1001   Present Prior to Hospital Arrival?:    Side:    Location: Abdomen   Orientation:    Incision Type:    Closure Method:    Additional Comments:    Removal Indication and Assessment: site change   Wound Outcome:    Removal Indications:    Removed 02/07/22 1300   Dressing Appearance Intact;Dry;Clean 02/09/22 0730   Drainage Amount None 02/09/22 0730   Care Cleansed with:;Soap and water 01/14/22 1000   Dressing Applied;Other (comment) 01/14/22 1000       [REMOVED]      Incision/Site 02/21/22 1318 Right Leg (Removed)   02/21/22 1318   Present Prior to Hospital Arrival?:    Side: Right   Location: Leg   Orientation:    Incision Type:    Closure Method:    Additional Comments:    Removal Indication and Assessment: site change   Wound Outcome:    Removal Indications:    Removed 04/14/22 1145   Wound Image    04/05/22 1130   Incision WDL WDL 04/12/22 0715   Dressing Appearance Open to air 04/12/22 0715   Drainage Amount None 04/12/22 0715   Drainage Characteristics/Odor Serosanguineous;No odor 03/22/22 1300   Appearance Pink;Dry 04/11/22 1000   Black (%), Wound Tissue Color 0 % 04/05/22 1130   Red (%), Wound Tissue Color 100 % 04/05/22 1130   Yellow (%), Wound Tissue Color 0 % 04/05/22 1130   Periwound Area Dry  04/11/22 1000   Wound Edges Defined 04/11/22 1000   Care Soap and water;Cleansed with: 04/12/22 0715   Dressing Gauze;Other (comment) 03/08/22 1300   Dressing Change Due 02/28/22 02/24/22 1540       [REMOVED]      Incision/Site 02/21/22 1318 Abdomen (Removed)   02/21/22 1318   Present Prior to Hospital Arrival?:    Side:    Location: Abdomen   Orientation:    Incision Type:    Closure Method:    Additional Comments:    Removal Indication and Assessment: site change   Wound Outcome:    Removal Indications:    Removed 02/23/22 1330   Dressing Appearance Dry;Intact 02/23/22 0000   Appearance Dressing in place, unable to visualize 02/23/22 0000       [REMOVED]      Incision/Site 03/09/22 1444 Abdomen (Removed)   03/09/22 1444   Present Prior to Hospital Arrival?:    Side:    Location: Abdomen   Orientation:    Incision Type:    Closure Method:    Additional Comments:    Removal Indication and Assessment:    Wound Outcome: Healed   Removal Indications:    Removed 04/14/22 1145   Wound Image    04/07/22 1415   Incision WDL WDL 04/12/22 0715   Dressing Appearance Open to air 04/12/22 0715   Drainage Amount None 04/12/22 0715   Drainage Characteristics/Odor Serosanguineous;No odor 03/22/22 1300   Appearance Dry;Intact 04/11/22 1000   Black (%), Wound Tissue Color 0 % 04/11/22 1000   Red (%), Wound Tissue Color 100 % 04/11/22 1000   Yellow (%), Wound Tissue Color 0 % 04/11/22 1000   Periwound Area Dry 04/11/22 1000   Wound Edges Defined 04/11/22 1000   Care Cleansed with:;Soap and water 04/12/22 0715   Dressing Removed;Applied;Changed;Non-adherent;Other (comment) 03/22/22 1300       [REMOVED]      Incision/Site 03/25/22 1008 Buttocks (Removed)   03/25/22 1008   Present Prior to Hospital Arrival?:    Side:    Location: Buttocks   Orientation:    Incision Type:    Closure Method:    Additional Comments:    Removal Indication and Assessment: site change   Wound Outcome:    Removal Indications:    Removed 04/14/22 1145    Incision WDL ex 04/13/22 0730   Dressing Appearance Dry;Intact 04/14/22 0738   Drainage Amount None 04/13/22 0730   Appearance Dressing in place, unable to visualize 04/14/22 2000   Care Cleansed with:;Antimicrobial agent;Wound cleanser 04/10/22 1600   Dressing Changed;Gauze, wet to dry 04/10/22 1600   Packing packed with;strip gauze 04/10/22 1600   Dressing Change Due 04/11/22 04/10/22 1600       [REMOVED]      Incision/Site 04/13/22 1052 Left Leg (Removed)   04/13/22 1052   Present Prior to Hospital Arrival?:    Side: Left   Location: Leg   Orientation:    Incision Type:    Closure Method:    Additional Comments:    Removal Indication and Assessment:    Wound Outcome:    Removal Indications:    Removed 04/14/22 1145   Appearance Dressing in place, unable to visualize 04/14/22 2000       [REMOVED]      Incision/Site 04/13/22 1052 Right Leg (Removed)   04/13/22 1052   Present Prior to Hospital Arrival?:    Side: Right   Location: Leg   Orientation:    Incision Type:    Closure Method:    Additional Comments:    Removal Indication and Assessment: site change   Wound Outcome:    Removal Indications:    Removed 04/14/22 1145   Appearance Dressing in place, unable to visualize 04/14/22 2000       [REMOVED]      Incision/Site 04/13/22 1052 Sacral Spine (Removed)   04/13/22 1052   Present Prior to Hospital Arrival?:    Side:    Location: Sacral Spine   Orientation:    Incision Type:    Closure Method:    Additional Comments:    Removal Indication and Assessment: site change   Wound Outcome:    Removal Indications:    Removed 04/14/22 1145   Dressing Appearance Dry;Clean;Intact 04/14/22 0738   Appearance Dressing in place, unable to visualize 04/14/22 2000       Assessment and Plan      Wound Assessment:  1. Sacral wound   2.  Lower extremity wounds     Wound Care Plan:   1. Clean with vashe.   2. Apply santyl to a vashe moistened 4x4  3. Cover and secure dry dressing.   4. Change daily and PRN for soilage.   5. Turn  every 2 hours  6. Low air loss mattress  7. TAP system  8. Keep pressure off wounds        Signature:  HERSON Viveros  Wound Care    Date of encounter: 05/17/2022

## 2022-05-17 NOTE — NURSING
Received pt at this time no change in patient status, sats 100% on Cpap, right forearm midline and right hd cath intact without signs of infection. Colostomy intact with brown stool noted in bag. dsg intact to sarcal and austyn lower extremities. Left upper quard. Peg intact and infusing Nepro at 60ml/hr. Safety measures intact, bed alarm turned on, call bell near. Will continue to monitor.

## 2022-05-17 NOTE — PROGRESS NOTES
[facility]  Pulmonology  Progress Note    Patient Name: Og Garcia  MRN: [id number]  Admission Date: 12/23/2021  Hospital Length of Stay: 145 days  Code Status: Prior  Attending Provider: Cecil Abernathy DO  Primary Care Provider: Hector Arndt DNP, FNP-C   Principal Problem: Denice gangrene    Subjective:     Interval History: No acute events overnight. The patient is afebrile this am. He is currently resting comfortably. Oxygenating adequately. He is on trach collar during the day and cpap overnight.    Objective:     Vital Signs (Most Recent):  Temp: 99.8 °F (37.7 °C) (05/17/22 1106)  Pulse: 98 (05/17/22 1308)  Resp: (!) 26 (05/17/22 1308)  BP: (!) 162/42 (05/17/22 1245)  SpO2: 100 % (05/17/22 1308)   Vital Signs (24h Range):  Temp:  [97.7 °F (36.5 °C)-100.1 °F (37.8 °C)] 99.8 °F (37.7 °C)  Pulse:  [] 98  Resp:  [8-37] 26  SpO2:  [96 %-100 %] 100 %  BP: ()/(35-66) 162/42     Weight: 77.2 kg (170 lb 3.1 oz)  Body mass index is 23.74 kg/m².      Intake/Output Summary (Last 24 hours) at 5/17/2022 1347  Last data filed at 5/17/2022 1200  Gross per 24 hour   Intake 2640 ml   Output 250 ml   Net 2390 ml         Physical Exam    Vents:  Vent Mode: CPAP PSV (05/17/22 0812)  Ventilator Initiated: No (04/23/22 1934)  Set Rate: 0 BPM (05/15/22 0750)  Vt Set: 0 mL (05/15/22 0750)  Pressure Support: 15 cmH20 (05/17/22 0812)  PEEP/CPAP: 5 cmH20 (05/17/22 0812)  Oxygen Concentration (%): 30 (05/17/22 1308)  Peak Airway Pressure: 20 cmH2O (05/17/22 0812)  Plateau Pressure: 20 cmH20 (03/07/22 0500)  Total Ve: 7.9 mL (05/17/22 0812)  F/VT Ratio<105 (RSBI): (!) 20.34 (05/16/22 0432)    Lines/Drains/Airways       Central Venous Catheter Line  Duration                  Hemodialysis Catheter 12/30/21 0900 right internal jugular 138 days              Drain  Duration                  Colostomy 12/18/21 1030 Descending/sigmoid  days         Gastrostomy/Enterostomy 03/09/22 1436  Percutaneous endoscopic gastrostomy (PEG) midline feeding 68 days              Airway  Duration                  Surgical Airway 04/27/22 0856 Marychuy;Other (Comment) Cuffed;Long 20 days              Peripheral Intravenous Line  Duration                  Peripheral IV - Single Lumen 04/11/22 1623 18 G Anterior;Proximal;Right Forearm 35 days                    Significant Labs:    CBC/Anemia Profile:  Recent Labs   Lab 05/16/22  0632   WBC 15.19*   HGB 7.7*   HCT 22.9*   *   MCV 93.5   RDW 14.8*          Chemistries:  Recent Labs   Lab 05/16/22  0632   *   K 3.8   CL 94*   CO2 24   BUN 88*   CREATININE 2.47*   CALCIUM 8.8         All pertinent labs within the past 24 hours have been reviewed.    Significant Imaging:  I have reviewed all pertinent imaging results/findings within the past 24 hours.    Assessment/Plan:     Chronic respiratory failure  - previously on trach collar, regressed  - doing well with trials  - can progress to trach collar continuous if tolerate  - may need PSV for rest overnight  5/16- currently doing well with trach collar during the day and PSV overnight    Pressure ulcer of sacral region, unstageable  Wound care is following the patient  Their help reviewed and appreciated    Fever  - febrile with leukocytosis  - on 5/8 patient is felt to have aspirated overnight. Started on zosyn  - Sputum culture with Pseudomonas sensitive to zosyn. Will continue for 7 days   Will DC after today  1 blood culture positive for coag negative staph which is felt to be a contaminant    ESRD (end stage renal disease)  Started on HD 12/30   Continue with HD per nephrology      Type 2 diabetes mellitus without complication, without long-term current use of insulin  Blood sugar is well controlled    Acute blood loss anemia  No active bleeding  Hgb 6.0 on 4/11  Patient transfused 1 unit with dialysis yesterday  4/13 -H/H 7.5/22.7  4/15- hgb is 6.2 today. We are going to give another unit of PRBC's today  with dialysis  4/16 post transfusion cbc is pending  4/18- hgb is 6.6. Will plan for transfusion with 1 unit of prbc's with next HD  4/21 - 7.2/21.6   4/25- 6.6/19.7 - will transfuse one unit PRBCs with next HD   4/28- Hgb now 8.0 following transfusion  5/2/2022 hemoglobin 7 no need for transfusion yet  5/3/2022 transfuse 1 unit packed red blood cells  05/05/2022 I believe this anemia is probably related to his chronic renal sufficiency a wonder if he is a candidate for Epogen at this point  5/10- transfused 1 unit yesterday  5/12 repeat H&H in am  05/13/2022 in 2 H&H pending  Most recent 7.7/22.9    Necrotizing fasciitis  S/p multiple surgeries   Last debridement for wounds was 4/13      Hypertension  BP is stable  Currently on rate control medications (cardizem 90 mg q6 and metoprolol 12.5 bid)                     Cecil Abernathy, DO  Pulmonology  [facility]

## 2022-05-17 NOTE — PROCEDURES
"Og Garcia is a 67 y.o. male patient.    Temp: 98.2 °F (36.8 °C) (05/17/22 1502)  Pulse: 89 (05/17/22 1600)  Resp: (!) 28 (05/17/22 1600)  BP: (!) 137/44 (05/17/22 1600)  SpO2: 100 % (05/17/22 1600)  Weight: 77.2 kg (170 lb 3.1 oz) (05/17/22 0600)  Height: 5' 11" (180.3 cm) (05/16/22 1123)       Debridement    Date/Time: 5/17/2022 4:34 PM  Performed by: HERSON Viveros  Authorized by: HERSON Viveros     Time out: Immediately prior to procedure a "time out" was called to verify the correct patient, procedure, equipment, support staff and site/side marked as required.    Consent Done?:  Yes (Written)  Local anesthesia used?: No      Wound Details:    Location:  Left foot    Type of Debridement:  Excisional       Length (cm):  7       Area (sq cm):  45.5       Width (cm):  6.5       Percent Debrided (%):  100       Depth (cm):  0.5       Total Area Debrided (sq cm):  45.5    Depth of debridement:  Bone    Tissue debrided:  Subcutaneous, Fascia and Bone    Devitalized tissue debrided:  Clots, Exudate, Necrotic/Eschar and Slough    Additional wounds:  1    2nd Wound Details:     Location:  Right leg    Type of Debridement:  Excisional       Length (cm):  10       Area (sq cm):  45       Width (cm):  4.5       Percent Debrided (%):  100       Depth (cm):  0.5       Total Area Debrided (sq cm):  45    Depth of debridement:  Subcutaneous tissue    Tissue debrided:  Subcutaneous    Devitalized tissue debrided:  Exudate, Necrotic/Eschar and Slough    Instruments:  Scissors    Bleeding:  None  Patient tolerance:  Patient tolerated the procedure well with no immediate complications        5/17/2022  "

## 2022-05-17 NOTE — SUBJECTIVE & OBJECTIVE
Interval History: The patient is doing acceptable.  He is continuing with his CPAP trials.  His son is at the bedside.  At this time, continue with current management.    Review of patient's allergies indicates:  No Known Allergies  Current Facility-Administered Medications   Medication Frequency    0.9%  NaCl infusion (for blood administration) Q24H PRN    0.9%  NaCl infusion PRN    acetaminophen tablet 500 mg Q6H PRN    albuterol nebulizer solution 2.5 mg Q4H PRN    albuterol-ipratropium 2.5 mg-0.5 mg/3 mL nebulizer solution 3 mL Q6H    bisacodyL EC tablet 10 mg Daily PRN    dextrose 50% injection 12.5 g PRN    diltiaZEM tablet 90 mg Q6H    glucagon (human recombinant) injection 1 mg PRN    guaiFENesin 100 mg/5 ml syrup 200 mg Q6H    heparin (porcine) injection 4,000 Units PRN    heparin (porcine) injection 5,000 Units Q8H    insulin aspart U-100 injection 1-10 Units Q6H    insulin detemir U-100 injection 25 Units QHS    metoprolol tartrate (LOPRESSOR) split tablet 12.5 mg BID    ondansetron injection 8 mg Q6H PRN    pantoprazole suspension 40 mg Daily    piperacillin-tazobactam (ZOSYN) 4.5 g in dextrose 5 % in water (D5W) 5 % 100 mL IVPB (MB+) Q12H    predniSONE tablet 2.5 mg Daily    sevelamer carbonate pwpk 0.8 g TID WM    silver sulfADIAZINE 1% cream Daily PRN    simethicone chewable tablet 80 mg TID PRN    sodium chloride 0.9% bolus 250 mL PRN       Objective:     Vital Signs (Most Recent):  Temp: 99.8 °F (37.7 °C) (05/17/22 1106)  Pulse: 101 (05/17/22 1245)  Resp: (!) 34 (05/17/22 1245)  BP: (!) 162/42 (05/17/22 1245)  SpO2: 100 % (05/17/22 1245)  O2 Device (Oxygen Therapy): ventilator (05/17/22 0053)   Vital Signs (24h Range):  Temp:  [97.7 °F (36.5 °C)-100.1 °F (37.8 °C)] 99.8 °F (37.7 °C)  Pulse:  [] 101  Resp:  [8-37] 34  SpO2:  [96 %-100 %] 100 %  BP: ()/(35-66) 162/42     Weight: 77.2 kg (170 lb 3.1 oz) (05/17/22 0600)  Body mass index is 23.74 kg/m².  Body surface area is 1.97 meters  squared.    I/O last 3 completed shifts:  In: 2990 [P.O.:360; Other:240; NG/GT:2190; IV Piggyback:200]  Out: 551 [Urine:1; Stool:550]    Physical Exam  Vitals reviewed.   HENT:      Head: Normocephalic.   Cardiovascular:      Rate and Rhythm: Regular rhythm.   Pulmonary:      Effort: Pulmonary effort is normal.   Abdominal:      Palpations: Abdomen is soft.   Neurological:      Mental Status: He is alert.       Significant Labs:  BMP:   Recent Labs   Lab 05/16/22  0632 05/17/22  0726   GLU 86 105   *  --    K 3.8  --    CL 94*  --    CO2 24  --    BUN 88*  --    CREATININE 2.47*  --    CALCIUM 8.8  --      CBC:   Recent Labs   Lab 05/16/22  0632   WBC 15.19*   RBC 2.45*   HGB 7.7*   HCT 22.9*   *   MCV 93.5   MCH 31.4*   MCHC 33.6        Significant Imaging:  Labs: Reviewed

## 2022-05-17 NOTE — PROGRESS NOTES
[facility]  Nephrology  Progress Note    Patient Name: Og Garcia  MRN: [id number]  Admission Date: 12/23/2021  Hospital Length of Stay: 145 days  Attending Provider: Cecil Abernathy DO   Primary Care Physician: Hector Arndt DNP, FNP-C  Principal Problem:Denice gangrene    Subjective:     HPI: The patient is known from the previous nephrology consult at [facility].  He is no at Specialty and continues to need dialysis support.      Interval History: The patient is doing acceptable.  He is continuing with his CPAP trials.  His son is at the bedside.  At this time, continue with current management.    Review of patient's allergies indicates:  No Known Allergies  Current Facility-Administered Medications   Medication Frequency    0.9%  NaCl infusion (for blood administration) Q24H PRN    0.9%  NaCl infusion PRN    acetaminophen tablet 500 mg Q6H PRN    albuterol nebulizer solution 2.5 mg Q4H PRN    albuterol-ipratropium 2.5 mg-0.5 mg/3 mL nebulizer solution 3 mL Q6H    bisacodyL EC tablet 10 mg Daily PRN    dextrose 50% injection 12.5 g PRN    diltiaZEM tablet 90 mg Q6H    glucagon (human recombinant) injection 1 mg PRN    guaiFENesin 100 mg/5 ml syrup 200 mg Q6H    heparin (porcine) injection 4,000 Units PRN    heparin (porcine) injection 5,000 Units Q8H    insulin aspart U-100 injection 1-10 Units Q6H    insulin detemir U-100 injection 25 Units QHS    metoprolol tartrate (LOPRESSOR) split tablet 12.5 mg BID    ondansetron injection 8 mg Q6H PRN    pantoprazole suspension 40 mg Daily    piperacillin-tazobactam (ZOSYN) 4.5 g in dextrose 5 % in water (D5W) 5 % 100 mL IVPB (MB+) Q12H    predniSONE tablet 2.5 mg Daily    sevelamer carbonate pwpk 0.8 g TID WM    silver sulfADIAZINE 1% cream Daily PRN    simethicone chewable tablet 80 mg TID PRN    sodium chloride 0.9% bolus 250 mL PRN       Objective:     Vital Signs (Most Recent):  Temp: 99.8 °F (37.7 °C) (05/17/22  1106)  Pulse: 101 (05/17/22 1245)  Resp: (!) 34 (05/17/22 1245)  BP: (!) 162/42 (05/17/22 1245)  SpO2: 100 % (05/17/22 1245)  O2 Device (Oxygen Therapy): ventilator (05/17/22 0053)   Vital Signs (24h Range):  Temp:  [97.7 °F (36.5 °C)-100.1 °F (37.8 °C)] 99.8 °F (37.7 °C)  Pulse:  [] 101  Resp:  [8-37] 34  SpO2:  [96 %-100 %] 100 %  BP: ()/(35-66) 162/42     Weight: 77.2 kg (170 lb 3.1 oz) (05/17/22 0600)  Body mass index is 23.74 kg/m².  Body surface area is 1.97 meters squared.    I/O last 3 completed shifts:  In: 2990 [P.O.:360; Other:240; NG/GT:2190; IV Piggyback:200]  Out: 551 [Urine:1; Stool:550]    Physical Exam  Vitals reviewed.   HENT:      Head: Normocephalic.   Cardiovascular:      Rate and Rhythm: Regular rhythm.   Pulmonary:      Effort: Pulmonary effort is normal.   Abdominal:      Palpations: Abdomen is soft.   Neurological:      Mental Status: He is alert.       Significant Labs:  BMP:   Recent Labs   Lab 05/16/22  0632 05/17/22  0726   GLU 86 105   *  --    K 3.8  --    CL 94*  --    CO2 24  --    BUN 88*  --    CREATININE 2.47*  --    CALCIUM 8.8  --      CBC:   Recent Labs   Lab 05/16/22  0632   WBC 15.19*   RBC 2.45*   HGB 7.7*   HCT 22.9*   *   MCV 93.5   MCH 31.4*   MCHC 33.6        Significant Imaging:  Labs: Reviewed    Assessment/Plan:     * Denice gangrene  Continue wound care  5/17/2022  Continue with wound care.    ESRD (end stage renal disease)  Dialysis MWF  5/16/2022 Continue with scheduled hemodialysis.  5/17/2022  Dialysis as scheduled    Hypertension   Controlled  5/16/2022  Hemodynamics are acceptable.        Thank you for your consult. I will follow-up with patient. Please contact us if you have any additional questions.    Edwin Pryor Jr, MD  Nephrology  [facility]

## 2022-05-17 NOTE — SUBJECTIVE & OBJECTIVE
Interval History: No acute events overnight. The patient is afebrile this am. He is currently resting comfortably. Oxygenating adequately. He is on trach collar during the day and cpap overnight.    Objective:     Vital Signs (Most Recent):  Temp: 99.8 °F (37.7 °C) (05/17/22 1106)  Pulse: 98 (05/17/22 1308)  Resp: (!) 26 (05/17/22 1308)  BP: (!) 162/42 (05/17/22 1245)  SpO2: 100 % (05/17/22 1308)   Vital Signs (24h Range):  Temp:  [97.7 °F (36.5 °C)-100.1 °F (37.8 °C)] 99.8 °F (37.7 °C)  Pulse:  [] 98  Resp:  [8-37] 26  SpO2:  [96 %-100 %] 100 %  BP: ()/(35-66) 162/42     Weight: 77.2 kg (170 lb 3.1 oz)  Body mass index is 23.74 kg/m².      Intake/Output Summary (Last 24 hours) at 5/17/2022 1347  Last data filed at 5/17/2022 1200  Gross per 24 hour   Intake 2640 ml   Output 250 ml   Net 2390 ml         Physical Exam    Vents:  Vent Mode: CPAP PSV (05/17/22 0812)  Ventilator Initiated: No (04/23/22 1934)  Set Rate: 0 BPM (05/15/22 0750)  Vt Set: 0 mL (05/15/22 0750)  Pressure Support: 15 cmH20 (05/17/22 0812)  PEEP/CPAP: 5 cmH20 (05/17/22 0812)  Oxygen Concentration (%): 30 (05/17/22 1308)  Peak Airway Pressure: 20 cmH2O (05/17/22 0812)  Plateau Pressure: 20 cmH20 (03/07/22 0500)  Total Ve: 7.9 mL (05/17/22 0812)  F/VT Ratio<105 (RSBI): (!) 20.34 (05/16/22 0432)    Lines/Drains/Airways       Central Venous Catheter Line  Duration                  Hemodialysis Catheter 12/30/21 0900 right internal jugular 138 days              Drain  Duration                  Colostomy 12/18/21 1030 Descending/sigmoid  days         Gastrostomy/Enterostomy 03/09/22 1436 Percutaneous endoscopic gastrostomy (PEG) midline feeding 68 days              Airway  Duration                  Surgical Airway 04/27/22 0856 Marychuy;Other (Comment) Cuffed;Long 20 days              Peripheral Intravenous Line  Duration                  Peripheral IV - Single Lumen 04/11/22 1623 18 G Anterior;Proximal;Right Forearm 35 days                     Significant Labs:    CBC/Anemia Profile:  Recent Labs   Lab 05/16/22  0632   WBC 15.19*   HGB 7.7*   HCT 22.9*   *   MCV 93.5   RDW 14.8*          Chemistries:  Recent Labs   Lab 05/16/22  0632   *   K 3.8   CL 94*   CO2 24   BUN 88*   CREATININE 2.47*   CALCIUM 8.8         All pertinent labs within the past 24 hours have been reviewed.    Significant Imaging:  I have reviewed all pertinent imaging results/findings within the past 24 hours.

## 2022-05-18 NOTE — SUBJECTIVE & OBJECTIVE
Interval History: No acute events overnight. The patient is afebrile this am. He is currently resting comfortably. Oxygenating adequately. He is on trach collar during the day and cpap overnight.    Objective:     Vital Signs (Most Recent):  Temp: 99.8 °F (37.7 °C) (05/18/22 0400)  Pulse: 99 (05/18/22 0600)  Resp: 12 (05/18/22 0600)  BP: (!) 120/42 (05/18/22 0600)  SpO2: 100 % (05/18/22 0600)   Vital Signs (24h Range):  Temp:  [98.2 °F (36.8 °C)-100.3 °F (37.9 °C)] 99.8 °F (37.7 °C)  Pulse:  [] 99  Resp:  [8-54] 12  SpO2:  [99 %-100 %] 100 %  BP: (103-180)/(32-92) 120/42     Weight: 76.6 kg (168 lb 14 oz)  Body mass index is 23.55 kg/m².      Intake/Output Summary (Last 24 hours) at 5/18/2022 0820  Last data filed at 5/18/2022 0500  Gross per 24 hour   Intake 1450 ml   Output 50 ml   Net 1400 ml         Physical Exam    Vents:  Vent Mode: CPAP / PSV (05/18/22 0516)  Ventilator Initiated: No (04/23/22 1934)  Set Rate: 0 BPM (05/18/22 0516)  Vt Set: 0 mL (05/18/22 0516)  Pressure Support: 15 cmH20 (05/18/22 0516)  PEEP/CPAP: 5 cmH20 (05/18/22 0516)  Oxygen Concentration (%): 30 (05/18/22 0039)  Peak Airway Pressure: 19 cmH2O (05/18/22 0516)  Plateau Pressure: 20 cmH20 (03/07/22 0500)  Total Ve: 6 mL (05/18/22 0516)  F/VT Ratio<105 (RSBI): (!) 20.34 (05/16/22 0432)    Lines/Drains/Airways       Central Venous Catheter Line  Duration                  Hemodialysis Catheter 12/30/21 0900 right internal jugular 138 days              Drain  Duration                  Colostomy 12/18/21 1030 Descending/sigmoid  days         Gastrostomy/Enterostomy 03/09/22 1436 Percutaneous endoscopic gastrostomy (PEG) midline feeding 69 days              Airway  Duration                  Surgical Airway 04/27/22 0856 Marychuy;Other (Comment) Cuffed;Long 20 days              Peripheral Intravenous Line  Duration                  Peripheral IV - Single Lumen 04/11/22 1623 18 G Anterior;Proximal;Right Forearm 36 days                     Significant Labs:    CBC/Anemia Profile:  No results for input(s): WBC, HGB, HCT, PLT, MCV, RDW, IRON, FERRITIN, RETIC, FOLATE, [id number], OCCULTBLOOD in the last 48 hours.       Chemistries:  No results for input(s): NA, K, CL, CO2, BUN, CREATININE, CALCIUM, ALBUMIN, PROT, BILITOT, ALKPHOS, ALT, AST, GLUCOSE, MG, PHOS in the last 48 hours.      All pertinent labs within the past 24 hours have been reviewed.    Significant Imaging:  I have reviewed all pertinent imaging results/findings within the past 24 hours.

## 2022-05-18 NOTE — ASSESSMENT & PLAN NOTE
- febrile with leukocytosis  - on 5/8 patient is felt to have aspirated overnight. Started on zosyn  - Sputum culture with Pseudomonas sensitive to zosyn. Will continue for 7 days   completed  1 blood culture positive for coag negative staph which is felt to be a contaminant

## 2022-05-18 NOTE — ASSESSMENT & PLAN NOTE
- previously on trach collar, regressed  - doing well with trials  - can progress to trach collar continuous if tolerate  - may need PSV for rest overnight  5/18- currently doing well with trach collar during the day and PSV overnight

## 2022-05-18 NOTE — PLAN OF CARE
Problem: Adult Inpatient Plan of Care  Goal: Plan of Care Review  Outcome: Ongoing, Progressing  Flowsheets (Taken 5/17/2022 2015)  Plan of Care Reviewed With: patient  Goal: Optimal Comfort and Wellbeing  Outcome: Ongoing, Progressing  Intervention: Monitor Pain and Promote Comfort  Flowsheets (Taken 5/17/2022 2015)  Pain Management Interventions:   pain management plan reviewed with patient/caregiver   position adjusted   relaxation techniques promoted  Intervention: Provide Person-Centered Care  Flowsheets (Taken 5/17/2022 2015)  Trust Relationship/Rapport:   care explained   choices provided   emotional support provided   empathic listening provided   questions answered   questions encouraged   reassurance provided   thoughts/feelings acknowledged     Problem: Glycemic Control Impaired (Sepsis/Septic Shock)  Goal: Blood Glucose Level Within Desired Range  Outcome: Ongoing, Progressing  Intervention: Optimize Glycemic Control  Flowsheets (Taken 5/17/2022 2015)  Glycemic Management:   blood glucose monitored   supplemental insulin given     Problem: Infection Progression (Sepsis/Septic Shock)  Goal: Absence of Infection Signs and Symptoms  Outcome: Ongoing, Progressing  Intervention: Initiate Sepsis Management  Flowsheets (Taken 5/17/2022 2015)  Infection Prevention:   rest/sleep promoted   single patient room provided   visitors restricted/screened   hand hygiene promoted   equipment surfaces disinfected   environmental surveillance performed   cohorting utilized   personal protective equipment utilized  Infection Management: aseptic technique maintained  Isolation Precautions: precautions maintained     Problem: Infection  Goal: Absence of Infection Signs and Symptoms  Outcome: Ongoing, Progressing  Intervention: Prevent or Manage Infection  Flowsheets (Taken 5/17/2022 2015)  Fever Reduction/Comfort Measures:   lightweight bedding   lightweight clothing  Infection Management: aseptic technique  maintained  Isolation Precautions: precautions maintained     Problem: Fall Injury Risk  Goal: Absence of Fall and Fall-Related Injury  Outcome: Ongoing, Progressing  Intervention: Identify and Manage Contributors  Flowsheets (Taken 5/17/2022 2015)  Self-Care Promotion:   independence encouraged   BADL personal objects within reach   BADL personal routines maintained   meal set-up provided   safe use of adaptive equipment encouraged  Medication Review/Management: medications reviewed

## 2022-05-18 NOTE — ASSESSMENT & PLAN NOTE
Clean wound with vashe  Apply sensicare around wound  Apply santyl naz thick to wound bed, cover with vashe/saline moistened 4x4  Cover and secure with abd pad and paper tape  Change daily  Turn every two hours  Low air loss mattress  Keep pressure off wound  TAP system   Bedside debridements weekly and PRN  X-ray on 5/18

## 2022-05-19 PROBLEM — M86.69 OTHER CHRONIC OSTEOMYELITIS, MULTIPLE SITES: Status: ACTIVE | Noted: 2022-01-01

## 2022-05-19 PROBLEM — L97.914: Status: ACTIVE | Noted: 2022-01-01

## 2022-05-19 PROBLEM — L97.514: Status: ACTIVE | Noted: 2022-01-01

## 2022-05-19 PROBLEM — M86.9 OSTEOMYELITIS: Status: ACTIVE | Noted: 2022-01-01

## 2022-05-19 NOTE — PROGRESS NOTES
[facility]  Nephrology  Progress Note    Patient Name: Og Garcia  MRN: [id number]  Admission Date: 12/23/2021  Hospital Length of Stay: 147 days  Attending Provider: Cecil Abernathy DO   Primary Care Physician: Hector Arndt DNP, FNP-C  Principal Problem:Denice gangrene    Subjective:     HPI: The patient is known from the previous nephrology consult at [facility].  He is no at Specialty and continues to need dialysis support.      Interval History: The patient is doing acceptable.  He is tolerating CPAP.  No other changes.    Review of patient's allergies indicates:  No Known Allergies  Current Facility-Administered Medications   Medication Frequency    0.9%  NaCl infusion (for blood administration) Q24H PRN    0.9%  NaCl infusion PRN    acetaminophen tablet 500 mg Q6H PRN    albuterol nebulizer solution 2.5 mg Q4H PRN    albuterol-ipratropium 2.5 mg-0.5 mg/3 mL nebulizer solution 3 mL Q6H    bisacodyL EC tablet 10 mg Daily PRN    collagenase ointment Daily PRN    dextrose 50% injection 12.5 g PRN    diltiaZEM tablet 90 mg Q6H    glucagon (human recombinant) injection 1 mg PRN    guaiFENesin 100 mg/5 ml syrup 200 mg Q6H    heparin (porcine) injection 4,000 Units PRN    heparin (porcine) injection 5,000 Units Q8H    insulin aspart U-100 injection 1-10 Units Q6H    insulin detemir U-100 injection 25 Units QHS    metoprolol tartrate (LOPRESSOR) split tablet 12.5 mg BID    pantoprazole suspension 40 mg Daily    predniSONE tablet 2.5 mg Daily    sevelamer carbonate pwpk 0.8 g TID WM    simethicone chewable tablet 80 mg TID PRN    sodium chloride 0.9% bolus 250 mL PRN       Objective:     Vital Signs (Most Recent):  Temp: (!) 100.4 °F (38 °C) (05/19/22 0300)  Pulse: (!) 112 (05/19/22 1323)  Resp: 19 (05/19/22 1323)  BP: 103/60 (05/19/22 1338)  SpO2: 100 % (05/19/22 1323)  O2 Device (Oxygen Therapy): Trach Collar (05/19/22 1323)   Vital Signs (24h Range):  Temp:   [100.3 °F (37.9 °C)-100.5 °F (38.1 °C)] 100.4 °F (38 °C)  Pulse:  [] 112  Resp:  [8-37] 19  SpO2:  [91 %-100 %] 100 %  BP: ()/(45-60) 103/60     Weight: 76.7 kg (169 lb 1.5 oz) (05/19/22 0400)  Body mass index is 23.58 kg/m².  Body surface area is 1.96 meters squared.    I/O last 3 completed shifts:  In: 2130 [P.O.:240; NG/GT:1890]  Out: 200 [Stool:200]    Physical Exam  Vitals reviewed.   HENT:      Head: Normocephalic.   Cardiovascular:      Rate and Rhythm: Regular rhythm.      Pulses: Normal pulses.   Pulmonary:      Effort: Pulmonary effort is normal.   Abdominal:      Palpations: Abdomen is soft.   Neurological:      Mental Status: He is alert.   Psychiatric:         Mood and Affect: Mood normal.       Significant Labs:  BMP:   Recent Labs   Lab 05/18/22  1216   *      K 3.6      CO2 27   BUN 17   CREATININE 0.95   CALCIUM 8.7     CBC:   Recent Labs   Lab 05/16/22  0632   WBC 15.19*   RBC 2.45*   HGB 7.7*   HCT 22.9*   *   MCV 93.5   MCH 31.4*   MCHC 33.6        Significant Imaging:  Labs: Reviewed    Assessment/Plan:     ESRD (end stage renal disease)  Dialysis MWF  5/16/2022 Continue with scheduled hemodialysis.  5/17/2022  Dialysis as scheduled  5/19/2022 Dialysis sessions are acceptable.    Hypertension   Controlled  5/16/2022  Hemodynamics are acceptable.  5/19/2022  The hemodynamics are acceptable.    Continue with dialysis.    Thank you for your consult. I will follow-up with patient. Please contact us if you have any additional questions.    Edwin Pryor Jr, MD  Nephrology  [facility]

## 2022-05-19 NOTE — SUBJECTIVE & OBJECTIVE
Interval History: The patient is doing acceptable.  He is tolerating CPAP.  No other changes.    Review of patient's allergies indicates:  No Known Allergies  Current Facility-Administered Medications   Medication Frequency    0.9%  NaCl infusion (for blood administration) Q24H PRN    0.9%  NaCl infusion PRN    acetaminophen tablet 500 mg Q6H PRN    albuterol nebulizer solution 2.5 mg Q4H PRN    albuterol-ipratropium 2.5 mg-0.5 mg/3 mL nebulizer solution 3 mL Q6H    bisacodyL EC tablet 10 mg Daily PRN    collagenase ointment Daily PRN    dextrose 50% injection 12.5 g PRN    diltiaZEM tablet 90 mg Q6H    glucagon (human recombinant) injection 1 mg PRN    guaiFENesin 100 mg/5 ml syrup 200 mg Q6H    heparin (porcine) injection 4,000 Units PRN    heparin (porcine) injection 5,000 Units Q8H    insulin aspart U-100 injection 1-10 Units Q6H    insulin detemir U-100 injection 25 Units QHS    metoprolol tartrate (LOPRESSOR) split tablet 12.5 mg BID    pantoprazole suspension 40 mg Daily    predniSONE tablet 2.5 mg Daily    sevelamer carbonate pwpk 0.8 g TID WM    simethicone chewable tablet 80 mg TID PRN    sodium chloride 0.9% bolus 250 mL PRN       Objective:     Vital Signs (Most Recent):  Temp: (!) 100.4 °F (38 °C) (05/19/22 0300)  Pulse: (!) 112 (05/19/22 1323)  Resp: 19 (05/19/22 1323)  BP: 103/60 (05/19/22 1338)  SpO2: 100 % (05/19/22 1323)  O2 Device (Oxygen Therapy): Trach Collar (05/19/22 1323)   Vital Signs (24h Range):  Temp:  [100.3 °F (37.9 °C)-100.5 °F (38.1 °C)] 100.4 °F (38 °C)  Pulse:  [] 112  Resp:  [8-37] 19  SpO2:  [91 %-100 %] 100 %  BP: ()/(45-60) 103/60     Weight: 76.7 kg (169 lb 1.5 oz) (05/19/22 0400)  Body mass index is 23.58 kg/m².  Body surface area is 1.96 meters squared.    I/O last 3 completed shifts:  In: 2130 [P.O.:240; NG/GT:1890]  Out: 200 [Stool:200]    Physical Exam  Vitals reviewed.   HENT:      Head: Normocephalic.   Cardiovascular:      Rate and Rhythm: Regular rhythm.       Pulses: Normal pulses.   Pulmonary:      Effort: Pulmonary effort is normal.   Abdominal:      Palpations: Abdomen is soft.   Neurological:      Mental Status: He is alert.   Psychiatric:         Mood and Affect: Mood normal.       Significant Labs:  BMP:   Recent Labs   Lab 05/18/22  1216   *      K 3.6      CO2 27   BUN 17   CREATININE 0.95   CALCIUM 8.7     CBC:   Recent Labs   Lab 05/16/22  0632   WBC 15.19*   RBC 2.45*   HGB 7.7*   HCT 22.9*   *   MCV 93.5   MCH 31.4*   MCHC 33.6        Significant Imaging:  Labs: Reviewed

## 2022-05-19 NOTE — PROGRESS NOTES
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 male with wounds to sacral, left lateral lower leg, right anterior foot, right hand, left posterior heel, and right lateral and medial foot. Wounds continue to worsen even with aggressive wound care, surgical interventions, and adherence to turn protocols. X-rays of lower extremities are consistent with osteomyelitis, bone culture and pathology collected today at bedside. Patient has been running fever for the past three days. 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 He is currently tolerating trach collar. Pertinent factors that delay wound healing include ESRD, chronic anemia,chronic respiratory failure, multiple co-morbidities, poor vascular supply, diabetes, decreased granulation tissue, necrosis and infection.     Review of Systems   Unable to perform ROS: Acuity of condition     Objective:     Vital Signs (Most Recent):  Temp: (!) 100.4 °F (38 °C) (05/19/22 0300)  Pulse: (!) 112 (05/19/22 1323)  Resp: 19 (05/19/22 1323)  BP: 103/60 (05/19/22 1338)  SpO2: 100 % (05/19/22 1323) Vital Signs (24h Range):  Temp:  [100.2 °F (37.9 °C)-100.5 °F (38.1 °C)] 100.4 °F (38 °C)  Pulse:  [] 112  Resp:  [8-37] 19  SpO2:  [91 %-100 %] 100 %  BP: ()/(45-60) 103/60     Weight: 76.7 kg (169 lb 1.5 oz)  Body mass index is 23.58 kg/m².      Physical Exam  Vitals reviewed.   Constitutional:       Appearance: He is  ill-appearing.      Comments: Chronically ill   HENT:      Head: Normocephalic.   Cardiovascular:      Rate and Rhythm: Regular rhythm. Tachycardia present.   Pulmonary:      Effort: Pulmonary effort is normal.   Skin:     Findings: Erythema present.      Comments: Multiple wounds   Neurological:      Mental Status: Mental status is at baseline.      Sensory: Sensory deficit present.      Motor: Weakness present.            Altered Skin Integrity 01/20/22 1000 Right anterior Foot Purple or maroon localized area of discolored intact skin or non-intact skin or a blood-filled blister. (Active)   01/20/22 1000   Altered Skin Integrity Present on Admission: suspected hospital acquired   Side: Right   Orientation: anterior   Location: Foot   Wound Number:    Is this injury device related?:    Primary Wound Type:    Description of Altered Skin Integrity: Purple or maroon localized area of discolored intact skin or non-intact skin or a blood-filled blister.   Removal Indication and Assessment:    Wound Outcome:    (Retired) Wound Length (cm):    (Retired) Wound Width (cm):    (Retired) Depth (cm):    Wound Description (Comments):    Removal Indications:    Wound Image     05/12/22 1200   Description of Altered Skin Integrity Full thickness tissue loss. Base is covered by slough and/or eschar in the wound bed 05/13/22 1300   Dressing Appearance Dried drainage 05/16/22 1300   Drainage Amount Scant 05/16/22 1300   Drainage Characteristics/Odor Tan;No odor 05/16/22 1300   Appearance Dressing in place, unable to visualize 05/17/22 0735   Tissue loss description Not applicable 05/16/22 1300   Black (%), Wound Tissue Color 0 % 05/12/22 1200   Red (%), Wound Tissue Color 5 % 05/12/22 1200   Yellow (%), Wound Tissue Color 95 % 05/12/22 1200   Periwound Area Moist 05/16/22 1300   Wound Edges Defined;Open 05/16/22 1300   Wound Length (cm) 2 cm 05/12/22 1200   Wound Width (cm) 1 cm 05/12/22 1200   Wound Depth (cm) 0.2 cm 05/12/22 1200    Wound Volume (cm^3) 0.4 cm^3 05/12/22 1200   Wound Surface Area (cm^2) 2 cm^2 05/12/22 1200   Care Cleansed with:;Soap and water;Applied:;Removed:;Skin Barrier;Moisturizing agent;Wound cleanser 05/16/22 1300   Dressing Removed;Applied;Changed;Silver;Gauze;Rolled gauze 05/16/22 1300   Periwound Care Moisture barrier applied 05/16/22 1300   Dressing Change Due 05/17/22 05/16/22 1300            Altered Skin Integrity 01/20/22 1000 Right lateral Foot Purple or maroon localized area of discolored intact skin or non-intact skin or a blood-filled blister. (Active)   01/20/22 1000   Altered Skin Integrity Present on Admission: suspected hospital acquired   Side: Right   Orientation: lateral   Location: Foot   Wound Number:    Is this injury device related?:    Primary Wound Type:    Description of Altered Skin Integrity: Purple or maroon localized area of discolored intact skin or non-intact skin or a blood-filled blister.   Removal Indication and Assessment:    Wound Outcome:    (Retired) Wound Length (cm):    (Retired) Wound Width (cm):    (Retired) Depth (cm):    Wound Description (Comments):    Removal Indications:    Wound Image    05/12/22 1200   Description of Altered Skin Integrity Full thickness tissue loss. Subcutaneous fat may be visible but bone, tendon or muscle are not exposed 05/13/22 1300   Dressing Appearance Moist drainage 05/16/22 1300   Drainage Amount Small 05/16/22 1300   Drainage Characteristics/Odor Serous;Tan;No odor 05/16/22 1300   Appearance Dressing in place, unable to visualize 05/17/22 0735   Tissue loss description Not applicable 05/16/22 1300   Black (%), Wound Tissue Color 0 % 05/12/22 1200   Red (%), Wound Tissue Color 30 % 05/12/22 1200   Yellow (%), Wound Tissue Color 70 % 05/12/22 1200   Periwound Area Moist 05/16/22 1300   Wound Edges Defined;Open 05/16/22 1300   Wound Length (cm) 3 cm 05/12/22 1200   Wound Width (cm) 4.5 cm 05/12/22 1200   Wound Depth (cm) 1 cm 05/12/22 1200   Wound  Volume (cm^3) 13.5 cm^3 05/12/22 1200   Wound Surface Area (cm^2) 13.5 cm^2 05/12/22 1200   Care Antimicrobial agent;Soap and water;Wound cleanser;Applied:;Removed:;Skin Barrier;Moisturizing agent 05/16/22 1300   Dressing Removed;Applied;Changed;Gauze, wet to moist;Silver;Gauze;Rolled gauze 05/16/22 1300   Periwound Care Moisture barrier applied 05/16/22 1300   Dressing Change Due 05/17/22 05/16/22 1300            Altered Skin Integrity 02/08/22 1300 Left posterior Heel Other (comment) Full thickness tissue loss. Base is covered by slough and/or eschar in the wound bed (Active)   02/08/22 1300   Altered Skin Integrity Present on Admission: yes   Side: Left   Orientation: posterior   Location: Heel   Wound Number:    Is this injury device related?: No   Primary Wound Type: Other   Description of Altered Skin Integrity: Full thickness tissue loss. Base is covered by slough and/or eschar in the wound bed   Removal Indication and Assessment:    Wound Outcome:    (Retired) Wound Length (cm):    (Retired) Wound Width (cm):    (Retired) Depth (cm):    Wound Description (Comments):    Removal Indications:    Wound Image     05/12/22 1200   Description of Altered Skin Integrity Full thickness tissue loss. Base is covered by slough and/or eschar in the wound bed 05/16/22 1300   Dressing Appearance Moist drainage 05/16/22 1300   Drainage Amount Small 05/16/22 1300   Drainage Characteristics/Odor Serous;Tan;No odor 05/16/22 1300   Appearance Dressing in place, unable to visualize 05/17/22 0735   Tissue loss description Not applicable 05/16/22 1300   Black (%), Wound Tissue Color 0 % 05/12/22 1200   Red (%), Wound Tissue Color 50 % 05/12/22 1200   Yellow (%), Wound Tissue Color 50 % 05/12/22 1200   Periwound Area Moist 05/16/22 1300   Wound Edges Defined;Open 05/16/22 1300   Wound Length (cm) 7 cm 05/12/22 1200   Wound Width (cm) 6.5 cm 05/12/22 1200   Wound Depth (cm) 0.5 cm 05/12/22 1200   Wound Volume (cm^3) 22.75 cm^3  05/12/22 1200   Wound Surface Area (cm^2) 45.5 cm^2 05/12/22 1200   Care Cleansed with:;Soap and water;Wound cleanser;Applied:;Removed:;Skin Barrier;Moisturizing agent 05/16/22 1300   Dressing Removed;Applied;Changed;Gauze, wet to moist;Silver;Gauze;Rolled gauze 05/16/22 1300   Periwound Care Moisture barrier applied 05/16/22 1300   Dressing Change Due 05/17/22 05/16/22 1300            Altered Skin Integrity 02/15/22 1300 Left lateral Leg Traumatic Partial thickness tissue loss. Shallow open ulcer with a red or pink wound bed, without slough. Intact or Open/Ruptured Serum-filled blister. (Active)   02/15/22 1300   Altered Skin Integrity Present on Admission: suspected hospital acquired   Side: Left   Orientation: lateral   Location: Leg   Wound Number:    Is this injury device related?: No   Primary Wound Type: Traumatic   Description of Altered Skin Integrity: Partial thickness tissue loss. Shallow open ulcer with a red or pink wound bed, without slough. Intact or Open/Ruptured Serum-filled blister.   Removal Indication and Assessment:    Wound Outcome:    (Retired) Wound Length (cm):    (Retired) Wound Width (cm):    (Retired) Depth (cm):    Wound Description (Comments):    Removal Indications:    Wound Image    05/12/22 1200   Description of Altered Skin Integrity Full thickness tissue loss. Base is covered by slough and/or eschar in the wound bed 04/26/22 1235   Dressing Appearance Dry;Open to air;Intact 05/16/22 1300   Drainage Amount None 05/16/22 1300   Appearance Dressing in place, unable to visualize 05/17/22 0735   Tissue loss description Not applicable 05/13/22 1300   Black (%), Wound Tissue Color 10 % 04/26/22 1235   Red (%), Wound Tissue Color 0 % 04/26/22 1235   Yellow (%), Wound Tissue Color 0 % 04/26/22 1235   Periwound Area Dry 05/16/22 1300   Wound Edges Defined 05/16/22 1300   Care Cleansed with:;Soap and water 05/16/22 1300   Dressing Removed;Applied;Changed 05/08/22 1200   Dressing Change Due  05/09/22 05/08/22 1200            Altered Skin Integrity 02/28/22 1300 Left anterior Foot Traumatic Partial thickness tissue loss. Shallow open ulcer with a red or pink wound bed, without slough. Intact or Open/Ruptured Serum-filled blister. (Active)   02/28/22 1300   Altered Skin Integrity Present on Admission: suspected hospital acquired   Side: Left   Orientation: anterior   Location: Foot   Wound Number:    Is this injury device related?: No   Primary Wound Type: Traumatic   Description of Altered Skin Integrity: Partial thickness tissue loss. Shallow open ulcer with a red or pink wound bed, without slough. Intact or Open/Ruptured Serum-filled blister.   Removal Indication and Assessment:    Wound Outcome:    (Retired) Wound Length (cm):    (Retired) Wound Width (cm):    (Retired) Depth (cm):    Wound Description (Comments):    Removal Indications:    Wound Image    05/12/22 1200   Description of Altered Skin Integrity Partial thickness tissue loss. Shallow open ulcer with a red or pink wound bed, without slough. Intact or Open/Ruptured Serum-filled blister. 05/12/22 1200   Dressing Appearance Dry;Open to air;Intact 05/16/22 1300   Drainage Amount None 05/16/22 1300   Drainage Characteristics/Odor Serous 05/10/22 0742   Appearance Dressing in place, unable to visualize 05/17/22 0735   Tissue loss description Partial thickness 05/04/22 1300   Black (%), Wound Tissue Color 0 % 05/04/22 1300   Red (%), Wound Tissue Color 99 % 05/04/22 1300   Yellow (%), Wound Tissue Color 0 % 05/04/22 1300   Periwound Area Dry 05/16/22 1300   Wound Edges Defined 05/16/22 1300   Care Cleansed with:;Soap and water 05/16/22 1300   Dressing Applied;Rolled gauze;Gauze 05/15/22 1200   Dressing Change Due 05/09/22 05/08/22 1200            Altered Skin Integrity 03/15/22 1000 Right lateral Malleolus Ulceration Purple or maroon localized area of discolored intact skin or non-intact skin or a blood-filled blister. (Active)   03/15/22 1000    Altered Skin Integrity Present on Admission: suspected hospital acquired   Side: Right   Orientation: lateral   Location: Malleolus   Wound Number:    Is this injury device related?: Yes   Primary Wound Type: Ulceration   Description of Altered Skin Integrity: Purple or maroon localized area of discolored intact skin or non-intact skin or a blood-filled blister.   Removal Indication and Assessment:    Wound Outcome:    (Retired) Wound Length (cm):    (Retired) Wound Width (cm):    (Retired) Depth (cm):    Wound Description (Comments):    Removal Indications:    Wound Image    05/12/22 1200   Description of Altered Skin Integrity Full thickness tissue loss. Subcutaneous fat may be visible but bone, tendon or muscle are not exposed 05/13/22 1300   Dressing Appearance Moist drainage 05/16/22 1300   Drainage Amount Small 05/16/22 1300   Drainage Characteristics/Odor Serosanguineous;No odor 05/16/22 1300   Appearance Dressing in place, unable to visualize 05/17/22 0735   Tissue loss description Full thickness 05/16/22 1300   Black (%), Wound Tissue Color 0 % 05/12/22 1200   Red (%), Wound Tissue Color 90 % 05/12/22 1200   Yellow (%), Wound Tissue Color 10 % 05/12/22 1200   Periwound Area Moist 05/16/22 1300   Wound Edges Defined;Open 05/16/22 1300   Wound Length (cm) 3.5 cm 05/12/22 1200   Wound Width (cm) 3 cm 05/12/22 1200   Wound Depth (cm) 0.2 cm 05/12/22 1200   Wound Volume (cm^3) 2.1 cm^3 05/12/22 1200   Wound Surface Area (cm^2) 10.5 cm^2 05/12/22 1200   Care Cleansed with:;Soap and water;Wound cleanser;Applied:;Removed:;Skin Barrier;Moisturizing agent 05/16/22 1300   Dressing Removed;Applied;Changed;Gauze, wet to moist;Silver;Gauze;Rolled gauze 05/16/22 1300   Periwound Care Moisture barrier applied 05/16/22 1300   Dressing Change Due 05/17/22 05/16/22 1300            Altered Skin Integrity 03/15/22 1000 Right lateral Leg Ulceration Purple or maroon localized area of discolored intact skin or non-intact skin or  a blood-filled blister. (Active)   03/15/22 1000   Altered Skin Integrity Present on Admission: suspected hospital acquired   Side: Right   Orientation: lateral   Location: Leg   Wound Number:    Is this injury device related?: No   Primary Wound Type: Ulceration   Description of Altered Skin Integrity: Purple or maroon localized area of discolored intact skin or non-intact skin or a blood-filled blister.   Removal Indication and Assessment:    Wound Outcome:    (Retired) Wound Length (cm):    (Retired) Wound Width (cm):    (Retired) Depth (cm):    Wound Description (Comments):    Removal Indications:    Wound Image    05/12/22 1200   Description of Altered Skin Integrity Full thickness tissue loss with exposed bone, tendon, or muscle. Often includes undermining and tunneling. May extend into muscle and/or supporting structures. 05/16/22 1300   Dressing Appearance Moist drainage 05/16/22 1300   Drainage Amount Small 05/16/22 1300   Drainage Characteristics/Odor Serosanguineous;No odor 05/16/22 1300   Appearance Dressing in place, unable to visualize 05/17/22 0735   Tissue loss description Full thickness 05/16/22 1300   Black (%), Wound Tissue Color 0 % 05/12/22 1200   Red (%), Wound Tissue Color 90 % 05/12/22 1200   Yellow (%), Wound Tissue Color 10 % 05/12/22 1200   Periwound Area Moist 05/16/22 1300   Wound Edges Defined;Open 05/16/22 1300   Wound Length (cm) 10 cm 05/12/22 1200   Wound Width (cm) 4.5 cm 05/12/22 1200   Wound Depth (cm) 0.5 cm 05/12/22 1200   Wound Volume (cm^3) 22.5 cm^3 05/12/22 1200   Wound Surface Area (cm^2) 45 cm^2 05/12/22 1200   Care Cleansed with:;Soap and water;Wound cleanser;Applied:;Removed:;Skin Barrier;Moisturizing agent 05/16/22 1300   Dressing Removed;Applied;Changed;Gauze, wet to moist;Gauze;Silver;Rolled gauze 05/16/22 1300   Periwound Care Moisture barrier applied 05/16/22 1300   Dressing Change Due 05/17/22 05/16/22 1300            Altered Skin Integrity 03/22/22 1300 posterior  Sacral spine Other (comment) Full thickness tissue loss. Base is covered by slough and/or eschar in the wound bed (Active)   03/22/22 1300   Altered Skin Integrity Present on Admission: yes   Side:    Orientation: posterior   Location: Sacral spine   Wound Number:    Is this injury device related?: No   Primary Wound Type: Other   Description of Altered Skin Integrity: Full thickness tissue loss. Base is covered by slough and/or eschar in the wound bed   Removal Indication and Assessment:    Wound Outcome:    (Retired) Wound Length (cm):    (Retired) Wound Width (cm):    (Retired) Depth (cm):    Wound Description (Comments):    Removal Indications:    Wound Image    05/12/22 1200   Description of Altered Skin Integrity Full thickness tissue loss with exposed bone, tendon, or muscle. Often includes undermining and tunneling. May extend into muscle and/or supporting structures. 05/16/22 1615   Dressing Appearance Moist drainage;Intact 05/18/22 2210   Drainage Amount Large 05/18/22 2210   Drainage Characteristics/Odor Chung 05/16/22 1615   Appearance Moist 05/18/22 2210   Tissue loss description Full thickness 05/16/22 1615   Black (%), Wound Tissue Color 0 % 05/12/22 1200   Red (%), Wound Tissue Color 70 % 05/12/22 1200   Yellow (%), Wound Tissue Color 30 % 05/12/22 1200   Periwound Area Hemosiderin Staining 05/16/22 1615   Wound Edges Open 05/16/22 1615   Wound Length (cm) 15 cm 05/12/22 1200   Wound Width (cm) 15 cm 05/12/22 1200   Wound Depth (cm) 3 cm 05/12/22 1200   Wound Volume (cm^3) 675 cm^3 05/12/22 1200   Wound Surface Area (cm^2) 225 cm^2 05/12/22 1200   Care Cleansed with:;Wound cleanser 05/18/22 2210   Dressing Changed;Gauze 05/18/22 2210   Packing packed with;gauze, iodoform;packing removed 05/17/22 0200   Periwound Care Moisture barrier applied 05/16/22 1615   Dressing Change Due 05/17/22 05/16/22 1615            Altered Skin Integrity 04/05/22 1130 Right anterior Toe, second Traumatic (Active)   04/05/22  1130   Altered Skin Integrity Present on Admission: suspected hospital acquired   Side: Right   Orientation: anterior   Location: Toe, second   Wound Number:    Is this injury device related?: No   Primary Wound Type: Traumatic   Description of Altered Skin Integrity:    Removal Indication and Assessment:    Wound Outcome:    (Retired) Wound Length (cm):    (Retired) Wound Width (cm):    (Retired) Depth (cm):    Wound Description (Comments):    Removal Indications:    Wound Image    05/12/22 1200   Dressing Appearance Open to air;Dry;Intact 05/16/22 1300   Drainage Amount None 05/16/22 1300   Drainage Characteristics/Odor Creamy 04/17/22 0800   Appearance Dressing in place, unable to visualize 05/17/22 0735   Tissue loss description Not applicable 05/13/22 1300   Wound Edges Defined 05/16/22 1300   Care Cleansed with:;Soap and water 05/16/22 1300   Dressing Applied 05/15/22 1200            Altered Skin Integrity 04/07/22 1130 posterior Buttocks Incontinence associated dermatitis (Active)   04/07/22 1130   Altered Skin Integrity Present on Admission: suspected hospital acquired   Side:    Orientation: posterior   Location: Buttocks   Wound Number:    Is this injury device related?: No   Primary Wound Type: Incontinence   Description of Altered Skin Integrity:    Removal Indication and Assessment:    Wound Outcome:    (Retired) Wound Length (cm):    (Retired) Wound Width (cm):    (Retired) Depth (cm):    Wound Description (Comments):    Removal Indications:    Wound Image    05/12/22 1200   Description of Altered Skin Integrity Partial thickness tissue loss. Shallow open ulcer with a red or pink wound bed, without slough. Intact or Open/Ruptured Serum-filled blister. 05/03/22 1245   Dressing Appearance Open to air;Dry 05/16/22 1300   Drainage Amount None 05/16/22 1300   Drainage Characteristics/Odor No odor 05/16/22 1300   Appearance Intact;Pink 05/17/22 0735   Tissue loss description Partial thickness 05/12/22 1200    Black (%), Wound Tissue Color 0 % 05/12/22 1200   Red (%), Wound Tissue Color 99 % 05/12/22 1200   Yellow (%), Wound Tissue Color 0 % 05/12/22 1200   Periwound Area Excoriated;[place] 05/16/22 1300   Wound Edges Defined 05/16/22 1300   Care Cleansed with:;Soap and water 05/16/22 1300   Dressing Removed;Applied;Changed 05/15/22 1200   Packing packed with 05/07/22 2200   Periwound Care Moisture barrier applied 05/16/22 1300   Dressing Change Due 05/09/22 05/08/22 1200            Altered Skin Integrity 04/21/22 1200 Right medial Toe, fifth Ulceration (Active)   04/21/22 1200   Altered Skin Integrity Present on Admission: suspected hospital acquired   Side: Right   Orientation: medial   Location: Toe, fifth   Wound Number:    Is this injury device related?: No   Primary Wound Type: Ulceration   Description of Altered Skin Integrity:    Removal Indication and Assessment:    Wound Outcome:    (Retired) Wound Length (cm):    (Retired) Wound Width (cm):    (Retired) Depth (cm):    Wound Description (Comments):    Removal Indications:    Wound Image    04/26/22 1235   Description of Altered Skin Integrity Full thickness tissue loss. Subcutaneous fat may be visible but bone, tendon or muscle are not exposed 05/13/22 1300   Dressing Appearance Moist drainage 05/16/22 1300   Drainage Amount Scant 05/16/22 1300   Drainage Characteristics/Odor Chung;Serous;No odor 05/16/22 1300   Appearance Dressing in place, unable to visualize 05/17/22 0735   Tissue loss description Not applicable 05/16/22 1300   Black (%), Wound Tissue Color 0 % 04/26/22 1235   Red (%), Wound Tissue Color 0 % 04/26/22 1235   Yellow (%), Wound Tissue Color 100 % 04/26/22 1235   Periwound Area Moist 05/16/22 1300   Wound Edges Defined 05/16/22 1300   Wound Length (cm) 0.6 cm 04/26/22 1235   Wound Width (cm) 0.6 cm 04/26/22 1235   Wound Depth (cm) 0.1 cm 04/26/22 1235   Wound Volume (cm^3) 0.036 cm^3 04/26/22 1235   Wound Surface Area (cm^2) 0.36 cm^2 04/26/22 1237    Care Cleansed with:;Soap and water 05/16/22 1300   Dressing Removed;Applied;Changed;Silver;Gauze 05/16/22 1300   Dressing Change Due 05/17/22 05/16/22 1300            Incision/Site 01/04/22 1444 Neck (Active)   01/04/22 1444   Present Prior to Hospital Arrival?: No   Side:    Location: Neck   Orientation:    Incision Type:    Closure Method:    Additional Comments:    Removal Indication and Assessment:    Wound Outcome:    Removal Indications:    Incision WDL WDL 05/18/22 0755   Dressing Appearance Open to air 05/18/22 0755   Drainage Amount None 05/15/22 1200       [REMOVED]      Negative Pressure Wound Therapy  12/14/21 midline;lower (Removed)   12/14/21    Side:    Orientation: midline;lower   Location: Lower quadrant   Additional Comments:    Removal Indication and Assessment: removed by previous caregiver   Location:    SDO Location:    Removed 02/16/22 0800   NPWT Type Vacuum Therapy 02/15/22 0600   Therapy Setting NPWT Vacuum off 02/15/22 1300   Pressure Setting NPWT 125 mmHg 02/15/22 0600   Therapy Interventions NPWT Seal intact 02/15/22 0600   Sponges Inserted NPWT Black 02/15/22 0600   Sponges Removed NPWT Black;White;1 02/10/22 1300   General Output (mL) 300 02/10/22 0930       [REMOVED]      Negative Pressure Wound Therapy  02/21/22 Right lower (Removed)   02/21/22    Side: Right   Orientation: lower   Location: Lower quadrant   Additional Comments:    Removal Indication and Assessment: No Longer Indicated   Location:    SDO Location:    Removed 02/22/22    NPWT Type Vacuum Therapy 02/22/22 0400   Therapy Setting NPWT Continuous therapy 02/22/22 0400   Pressure Setting NPWT other (see comments) 02/22/22 0400   Therapy Interventions NPWT Seal intact 02/22/22 0400   General Output (mL) 0 02/21/22 2340       [REMOVED]      Altered Skin Integrity Left lateral Thigh Purple or maroon localized area of discolored intact skin or non-intact skin or a blood-filled blister. (Removed)       Altered Skin Integrity  Present on Admission:    Side: Left   Orientation: lateral   Location: Thigh   Wound Number:    Is this injury device related?:    Primary Wound Type:    Description of Altered Skin Integrity: Purple or maroon localized area of discolored intact skin or non-intact skin or a blood-filled blister.   Removal Indication and Assessment:    Wound Outcome: Healed   (Retired) Wound Length (cm):    (Retired) Wound Width (cm):    (Retired) Depth (cm):    Wound Description (Comments):    Removal Indications:    Removed 03/15/22 1000   Wound Image    03/15/22 1000   Description of Altered Skin Integrity Purple or maroon localized area of discolored intact skin or non-intact skin or a blood-filled blister. 02/13/22 0730   Dressing Appearance Open to air;Dry;Intact 03/15/22 1000   Drainage Amount None 03/15/22 1000   Appearance Dry 03/15/22 1000   Care Cleansed with:;Soap and water 12/28/21 1521       [REMOVED]      Altered Skin Integrity 01/27/22 1045 Right dorsal Hand Other (comment) Full thickness tissue loss. Base is covered by slough and/or eschar in the wound bed (Removed)   01/27/22 1045   Altered Skin Integrity Present on Admission: suspected hospital acquired   Side: Right   Orientation: dorsal   Location: Hand   Wound Number:    Is this injury device related?: No   Primary Wound Type: Other   Description of Altered Skin Integrity: Full thickness tissue loss. Base is covered by slough and/or eschar in the wound bed   Removal Indication and Assessment:    Wound Outcome:    (Retired) Wound Length (cm):    (Retired) Wound Width (cm):    (Retired) Depth (cm):    Wound Description (Comments):    Removal Indications:    Removed 04/05/22 1130   Wound Image    04/05/22 1130   Description of Altered Skin Integrity Partial thickness tissue loss. Shallow open ulcer with a red or pink wound bed, without slough. Intact or Open/Ruptured Serum-filled blister. 02/28/22 1300   Dressing Appearance Open to air;Dry;Intact 03/21/22 1300    Drainage Amount None 03/21/22 1300   Drainage Characteristics/Odor Serosanguineous;No odor 03/01/22 1330   Appearance Pink;Dry 04/05/22 1130   Tissue loss description Not applicable 04/05/22 1130   Black (%), Wound Tissue Color 99 % 02/07/22 1300   Red (%), Wound Tissue Color 95 % 02/24/22 1330   Yellow (%), Wound Tissue Color 5 % 02/24/22 1330   Periwound Area Dry;Intact 03/21/22 1300   Wound Edges Defined 03/21/22 1300   Wound Length (cm) 3.5 cm 02/07/22 1300   Wound Width (cm) 2 cm 02/07/22 1300   Wound Depth (cm) 0 cm 02/07/22 1300   Wound Volume (cm^3) 0 cm^3 02/07/22 1300   Wound Surface Area (cm^2) 7 cm^2 02/07/22 1300   Care Cleansed with:;Soap and water 03/21/22 1300   Dressing Applied;Other (comment) 03/02/22 1300   Dressing Change Due 03/02/22 03/01/22 1330       [REMOVED]      Altered Skin Integrity 02/08/22 1300 Right lateral Malleolus Traumatic Partial thickness tissue loss. Shallow open ulcer with a red or pink wound bed, without slough. Intact or Open/Ruptured Serum-filled blister. (Removed)   02/08/22 1300   Altered Skin Integrity Present on Admission: suspected hospital acquired   Side: Right   Orientation: lateral   Location: Malleolus   Wound Number:    Is this injury device related?: No   Primary Wound Type: Traumatic   Description of Altered Skin Integrity: Partial thickness tissue loss. Shallow open ulcer with a red or pink wound bed, without slough. Intact or Open/Ruptured Serum-filled blister.   Removal Indication and Assessment: site change   Wound Outcome:    (Retired) Wound Length (cm):    (Retired) Wound Width (cm):    (Retired) Depth (cm):    Wound Description (Comments):    Removal Indications:    Removed 03/15/22 1000   Wound Image    03/07/22 1300   Description of Altered Skin Integrity Partial thickness tissue loss. Shallow open ulcer with a red or pink wound bed, without slough. Intact or Open/Ruptured Serum-filled blister. 03/07/22 1300   Dressing Appearance Dry 03/14/22 0945    Drainage Amount None 03/12/22 1621   Drainage Characteristics/Odor Brown;No odor 03/09/22 1250   Appearance Dressing in place, unable to visualize;Intact 03/13/22 2000   Tissue loss description Not applicable 03/09/22 1250   Periwound Area Dry 03/14/22 0945   Wound Edges Defined 03/14/22 0945   Care Cleansed with:;Soap and water 03/16/22 1100   Dressing Applied;Silver 03/16/22 1100   Dressing Change Due 03/11/22 03/10/22 1100       [REMOVED]      Altered Skin Integrity 02/08/22 1300 Right posterior Achilles Traumatic Purple or maroon localized area of discolored intact skin or non-intact skin or a blood-filled blister. (Removed)   02/08/22 1300   Altered Skin Integrity Present on Admission: suspected hospital acquired   Side: Right   Orientation: posterior   Location: Achilles   Wound Number:    Is this injury device related?: No   Primary Wound Type: Traumatic   Description of Altered Skin Integrity: Purple or maroon localized area of discolored intact skin or non-intact skin or a blood-filled blister.   Removal Indication and Assessment:    Wound Outcome: Healed   (Retired) Wound Length (cm):    (Retired) Wound Width (cm):    (Retired) Depth (cm):    Wound Description (Comments):    Removal Indications:    Removed 03/15/22 1000   Wound Image    03/15/22 1000   Description of Altered Skin Integrity Purple or maroon localized area of discolored intact skin or non-intact skin or a blood-filled blister. 03/07/22 1300   Dressing Appearance Dry;Open to air;Intact 03/15/22 1000   Drainage Amount None 03/15/22 1000   Appearance Dressing in place, unable to visualize;Intact 03/13/22 2000   Tissue loss description Not applicable 03/15/22 1000   Wound Edges Defined 03/15/22 1000   Care Cleansed with:;Soap and water 03/15/22 1000   Dressing Changed 02/21/22 2340       [REMOVED]      Altered Skin Integrity 02/15/22 1300 Right anterior Foot Traumatic Partial thickness tissue loss. Shallow open ulcer with a red or pink wound  bed, without slough. Intact or Open/Ruptured Serum-filled blister. (Removed)   02/15/22 1300   Altered Skin Integrity Present on Admission: suspected hospital acquired   Side: Right   Orientation: anterior   Location: Foot   Wound Number:    Is this injury device related?: No   Primary Wound Type: Traumatic   Description of Altered Skin Integrity: Partial thickness tissue loss. Shallow open ulcer with a red or pink wound bed, without slough. Intact or Open/Ruptured Serum-filled blister.   Removal Indication and Assessment:    Wound Outcome: Healed   (Retired) Wound Length (cm):    (Retired) Wound Width (cm):    (Retired) Depth (cm):    Wound Description (Comments):    Removal Indications:    Removed 05/03/22 1300   Wound Image    04/26/22 1235   Description of Altered Skin Integrity Purple or maroon localized area of discolored intact skin or non-intact skin or a blood-filled blister. 03/15/22 1000   Dressing Appearance Dry 05/03/22 1245   Drainage Amount None 05/03/22 1245   Drainage Characteristics/Odor Clear 05/01/22 0720   Appearance Dressing in place, unable to visualize 05/03/22 2000   Tissue loss description Not applicable 05/03/22 1245   Black (%), Wound Tissue Color 99 % 03/15/22 1000   Red (%), Wound Tissue Color 99 % 02/15/22 1300   Periwound Area Dry 05/03/22 1245   Wound Edges Defined 05/03/22 1245   Care Cleansed with:;Soap and water 05/03/22 1245   Dressing Changed 05/01/22 1800   Dressing Change Due 04/26/22 04/25/22 1700       [REMOVED]      Altered Skin Integrity 03/07/22 1300 Left anterior Greater trochanter Moisture associated dermatitis (Removed)   03/07/22 1300   Altered Skin Integrity Present on Admission:    Side: Left   Orientation: anterior   Location: Greater trochanter   Wound Number:    Is this injury device related?: No   Primary Wound Type: Moisture ass   Description of Altered Skin Integrity:    Removal Indication and Assessment:    Wound Outcome: Healed   (Retired) Wound Length (cm):     (Retired) Wound Width (cm):    (Retired) Depth (cm):    Wound Description (Comments):    Removal Indications:    Removed 05/03/22 1300   Wound Image    04/21/22 1200   Dressing Appearance Open to air;Dry;Intact 05/03/22 1245   Drainage Amount None 05/03/22 1245   Appearance Dressing in place, unable to visualize 05/03/22 2000   Tissue loss description Not applicable 04/26/22 1235   Periwound Area Dry 05/03/22 1245   Wound Edges Defined 05/03/22 1245   Care Cleansed with:;Soap and water 05/03/22 1245       [REMOVED]      Altered Skin Integrity 03/07/22 1300 Right anterior Toe, fourth Ulceration Partial thickness tissue loss. Shallow open ulcer with a red or pink wound bed, without slough. Intact or Open/Ruptured Serum-filled blister. (Removed)   03/07/22 1300   Altered Skin Integrity Present on Admission: yes   Side: Right   Orientation: anterior   Location: Toe, fourth   Wound Number:    Is this injury device related?: No   Primary Wound Type: Ulceration   Description of Altered Skin Integrity: Partial thickness tissue loss. Shallow open ulcer with a red or pink wound bed, without slough. Intact or Open/Ruptured Serum-filled blister.   Removal Indication and Assessment:    Wound Outcome:    (Retired) Wound Length (cm):    (Retired) Wound Width (cm):    (Retired) Depth (cm):    Wound Description (Comments):    Removal Indications:    Removed 04/13/22 1030   Wound Image    04/05/22 1130   Description of Altered Skin Integrity Full thickness tissue loss. Base is covered by slough and/or eschar in the wound bed 04/05/22 1130   Dressing Appearance Dry;Intact 04/12/22 0715   Drainage Amount None 04/12/22 0715   Drainage Characteristics/Odor No odor 04/11/22 1000   Appearance Black;Dry 04/11/22 1000   Tissue loss description Not applicable 04/11/22 1000   Black (%), Wound Tissue Color 100 % 04/05/22 1130   Red (%), Wound Tissue Color 0 % 04/05/22 1130   Yellow (%), Wound Tissue Color 0 % 04/05/22 1130   Periwound Area  Dry 04/11/22 1000   Wound Edges Defined 04/11/22 1000   Care Cleansed with:;Soap and water 04/11/22 1000   Dressing Removed;Applied;Changed;Hydrocolloid 04/07/22 1145   Dressing Change Due 04/11/22 04/07/22 1145       [REMOVED]      Altered Skin Integrity 04/19/22 1025 Left lateral Lower quadrant Skin Tear (Removed)   04/19/22 1025   Altered Skin Integrity Present on Admission: suspected hospital acquired   Side: Left   Orientation: lateral   Location: Lower quadrant   Wound Number:    Is this injury device related?: No   Primary Wound Type: Skin tear   Description of Altered Skin Integrity:    Removal Indication and Assessment:    Wound Outcome: Healed   (Retired) Wound Length (cm):    (Retired) Wound Width (cm):    (Retired) Depth (cm):    Wound Description (Comments):    Removal Indications:    Removed 05/03/22 1300   Wound Image    05/03/22 1245   Description of Altered Skin Integrity Partial thickness tissue loss. Shallow open ulcer with a red or pink wound bed, without slough. Intact or Open/Ruptured Serum-filled blister. 04/26/22 1235   Dressing Appearance Dry;Open to air;Intact 05/03/22 1245   Drainage Amount None 05/03/22 1245   Drainage Characteristics/Odor Serosanguineous;No odor 04/26/22 1235   Appearance Dry 05/03/22 2000   Tissue loss description Partial thickness 05/03/22 1245   Black (%), Wound Tissue Color 0 % 05/03/22 1245   Red (%), Wound Tissue Color 100 % 05/03/22 1245   Yellow (%), Wound Tissue Color 0 % 05/03/22 1245   Periwound Area Dry 05/03/22 1245   Wound Edges Defined 05/03/22 1245   Wound Length (cm) 2 cm 04/19/22 1025   Wound Width (cm) 1 cm 04/19/22 1025   Wound Depth (cm) 0 cm 04/19/22 1025   Wound Volume (cm^3) 0 cm^3 04/19/22 1025   Wound Surface Area (cm^2) 2 cm^2 04/19/22 1025   Care Cleansed with:;Soap and water;Moisturizing agent 05/03/22 1245   Dressing Foam 05/02/22 1300       [REMOVED]      Wound 12/27/21 1100 Traumatic Left anterior Knee (Removed)   12/27/21 1100     Pre-existing: Yes   Primary Wound Type: Traumatic   Side: Left   Orientation: anterior   Location: Knee   Wound Number:    Ankle-Brachial Index:    Pulses:    Removal Indication and Assessment:    Wound Outcome: Healed   (Retired) Wound Type:    (Retired) Wound Length (cm):    (Retired) Wound Width (cm):    (Retired) Depth (cm):    Wound Description (Comments):    Removal Indications:    Removed 02/28/22 1300   Wound Image    02/24/22 1330   Wound WDL WDL 02/28/22 1300   Dressing Appearance Open to air;Dry;Intact 02/28/22 1300   Drainage Amount None 02/28/22 1300   Appearance Intact 02/28/22 1300   Care Cleansed with:;Soap and water 02/07/22 1300       [REMOVED]      Incision/Site 12/13/21 2317 Perineum (Removed)   12/13/21 2317   Present Prior to Hospital Arrival?:    Side:    Location: Perineum   Orientation:    Incision Type:    Closure Method: Other (see comments)   Additional Comments:    Removal Indication and Assessment: site change   Wound Outcome:    Removal Indications:    Removed 03/22/22 1300   Wound Image    03/15/22 1000   Incision WDL WDL 03/21/22 1300   Dressing Appearance Moist drainage 03/21/22 1300   Drainage Amount Small 03/21/22 1300   Drainage Characteristics/Odor Serosanguineous;No odor 03/21/22 1300   Appearance Dressing in place, unable to visualize;Intact 03/23/22 0000   Black (%), Wound Tissue Color 0 % 03/15/22 1000   Red (%), Wound Tissue Color 99 % 03/15/22 1000   Yellow (%), Wound Tissue Color 0 % 03/15/22 1000   Periwound Area Moist 03/21/22 1300   Wound Edges Defined;Open 03/21/22 1300   Wound Length (cm) 7.5 cm 03/15/22 1000   Wound Width (cm) 11 cm 03/15/22 1000   Wound Depth (cm) 2 cm 03/15/22 1000   Wound Volume (cm^3) 165 cm^3 03/15/22 1000   Wound Surface Area (cm^2) 82.5 cm^2 03/15/22 1000   Care Cleansed with:;Soap and water;Wound cleanser;Applied:;Removed:;Skin Barrier;Moisturizing agent 03/21/22 1300   Dressing Removed;Applied;Changed;Gauze, wet to moist;Gauze;Other  (comment) 03/21/22 1300   Packing packing removed 03/20/22 1550   Packing Inserted  1 01/23/22 1800   Packing Removed 1 02/05/22 0730   Periwound Care Moisture barrier applied;Skin barrier film applied 03/21/22 1300   Dressing Change Due 03/22/22 03/21/22 1300       [REMOVED]      Incision/Site 12/27/21 1100 Right Buttocks (Removed)   12/27/21 1100   Present Prior to Hospital Arrival?:    Side: Right   Location: Buttocks   Orientation:    Incision Type:    Closure Method:    Additional Comments:    Removal Indication and Assessment:    Wound Outcome:    Removal Indications:    Removed 12/27/21 1100   Incision WDL WDL 12/26/21 0730   Dressing Appearance Dry;Intact;Dried drainage 12/26/21 0730   Drainage Amount Scant 12/26/21 0730   Drainage Characteristics/Odor Brown 12/26/21 0730   Appearance Dressing in place, unable to visualize 12/27/21 2000   Care Cleansed with:;Antimicrobial agent;Soap and water;Applied:;Other (see comments) 12/26/21 0730   Dressing Removed;Changed;Gauze, wet to dry 12/26/21 0730   Dressing Change Due 12/27/21 12/26/21 1530       [REMOVED]      Incision/Site 12/14/21 1238 Abdomen (Removed)   12/14/21 1238   Present Prior to Hospital Arrival?:    Side:    Location: Abdomen   Orientation:    Incision Type:    Closure Method:    Additional Comments:    Removal Indication and Assessment: site change   Wound Outcome:    Removal Indications:    Removed 02/07/22 1330   Wound Image    12/27/21 1100   Incision WDL WDL 01/27/22 1045   Dressing Appearance Dry;Intact 01/22/22 0010   Drainage Amount Moderate 01/18/22 0730   Drainage Characteristics/Odor Brown 01/09/22 0715   Appearance Chung;Moist 12/29/21 1100   Periwound Area Moist 12/29/21 1100   Wound Edges Defined;Open 12/29/21 1100   Care Cleansed with:;Sterile normal saline;Applied:;Removed:;Skin Barrier 12/28/21 1330   Dressing Removed;Changed;Gauze, wet to dry 01/01/22 0550   Packing packing removed;packed with;gauze, iodoform 01/01/22 0550    Dressing Change Due 01/02/22 01/01/22 0550       [REMOVED]      Incision/Site 01/01/22 0936 Abdomen (Removed)   01/01/22 0936   Present Prior to Hospital Arrival?:    Side:    Location: Abdomen   Orientation:    Incision Type:    Closure Method:    Additional Comments:    Removal Indication and Assessment: site change   Wound Outcome:    Removal Indications:    Removed 02/07/22 1330   Incision WDL ex 01/23/22 0730   Dressing Appearance Intact;Clean 01/23/22 2010   Drainage Amount Moderate 01/23/22 0730   Drainage Characteristics/Odor Sanguineous 01/23/22 0730   Appearance Dressing in place, unable to visualize 01/23/22 0730   Dressing Foam 01/19/22 0730       [REMOVED]      Incision/Site 01/01/22 0956 Right Groin (Removed)   01/01/22 0956   Present Prior to Hospital Arrival?:    Side: Right   Location: Groin   Orientation:    Incision Type:    Closure Method:    Additional Comments:    Removal Indication and Assessment: site change   Wound Outcome:    Removal Indications:    Removed 02/07/22 1300   Incision WDL WDL 01/22/22 0745   Dressing Appearance Open to air 01/19/22 0730   Drainage Amount None 01/19/22 0730   Care Cleansed with:;Soap and water 01/19/22 0730   Dressing Applied 01/14/22 0800       [REMOVED]      Incision/Site 01/04/22 1444 Abdomen (Removed)   01/04/22 1444   Present Prior to Hospital Arrival?:    Side:    Location: Abdomen   Orientation:    Incision Type:    Closure Method:    Additional Comments:    Removal Indication and Assessment: site change   Wound Outcome:    Removal Indications:    Removed 02/07/22 1300   Incision WDL ex 01/19/22 0730   Dressing Appearance Dry;Intact 01/21/22 0730   Drainage Amount Moderate 01/21/22 0730   Drainage Characteristics/Odor Brown;Serosanguineous 01/21/22 0730   Appearance Intact 01/19/22 0730   Dressing Foam 01/19/22 0730       [REMOVED]      Incision/Site 01/04/22 1444 Buttocks (Removed)   01/04/22 1444   Present Prior to Hospital Arrival?:    Side:     Location: Buttocks   Orientation:    Incision Type:    Closure Method:    Additional Comments:    Removal Indication and Assessment: site change   Wound Outcome:    Removal Indications:    Removed 03/22/22 1300   Wound Image    03/15/22 1000   Incision WDL WDL 03/21/22 1300   Dressing Appearance Moist drainage 03/21/22 1300   Drainage Amount Small 03/21/22 1300   Drainage Characteristics/Odor Serosanguineous;Other (see comments) 03/21/22 1300   Appearance Dressing in place, unable to visualize;Intact 03/23/22 0000   Black (%), Wound Tissue Color 80 % 03/15/22 1000   Red (%), Wound Tissue Color 20 % 03/15/22 1000   Yellow (%), Wound Tissue Color 0 % 03/15/22 1000   Periwound Area Moist 03/21/22 1300   Wound Edges Defined;Open 03/21/22 1300   Wound Length (cm) 10 cm 03/15/22 1000   Wound Width (cm) 6 cm 03/15/22 1000   Wound Depth (cm) 0 cm 03/15/22 1000   Wound Volume (cm^3) 0 cm^3 03/15/22 1000   Wound Surface Area (cm^2) 60 cm^2 03/15/22 1000   Care Cleansed with:;Soap and water;Wound cleanser;Applied:;Removed:;Skin Barrier;Moisturizing agent 03/21/22 1300   Dressing Removed;Applied;Changed;Gauze, wet to moist;Gauze;Other (comment) 03/21/22 1300   Packing packing removed 03/13/22 1300   Periwound Care Moisture barrier applied;Skin barrier film applied 03/21/22 1300   Dressing Change Due 03/22/22 03/21/22 1300       [REMOVED]      Incision/Site 01/07/22 1106 Abdomen (Removed)   01/07/22 1106   Present Prior to Hospital Arrival?:    Side:    Location: Abdomen   Orientation:    Incision Type:    Closure Method:    Additional Comments:    Removal Indication and Assessment: site change   Wound Outcome:    Removal Indications:    Removed 02/07/22 1300   Dressing Appearance Dry;Intact 02/05/22 0730   Drainage Amount None 02/05/22 0730   Drainage Characteristics/Odor Brown;Sanguineous 01/19/22 0730   Dressing Other (comment) 01/10/22 0720       [REMOVED]      Incision/Site 01/10/22 1518 Abdomen (Removed)   01/10/22 1518    Present Prior to Hospital Arrival?:    Side:    Location: Abdomen   Orientation:    Incision Type:    Closure Method:    Additional Comments:    Removal Indication and Assessment:    Wound Outcome: Healed   Removal Indications:    Removed 03/15/22 1000   Wound Image     03/15/22 1000   Incision WDL WDL 03/15/22 1000   Dressing Appearance Moist drainage 03/15/22 1000   Drainage Amount Scant 03/15/22 1000   Drainage Characteristics/Odor Serosanguineous;No odor 03/15/22 1000   Appearance Pink;Hypergranulation;Moist;Dry 03/15/22 1000   Red (%), Wound Tissue Color 30 % 03/15/22 1000   Periwound Area Dry;Moist 03/15/22 1000   Wound Edges Defined 03/15/22 1000   Care Cleansed with:;Soap and water;Applied:;Removed:;Skin Barrier;Moisturizing agent 03/15/22 1000   Dressing Removed;Applied;Changed;Gauze;Non-adherent;Other (comment) 03/15/22 1000   Dressing Change Due 03/18/22 03/15/22 1000       [REMOVED]      Incision/Site 01/12/22 1001 Abdomen (Removed)   01/12/22 1001   Present Prior to Hospital Arrival?:    Side:    Location: Abdomen   Orientation:    Incision Type:    Closure Method:    Additional Comments:    Removal Indication and Assessment: site change   Wound Outcome:    Removal Indications:    Removed 02/07/22 1300   Dressing Appearance Intact;Dry;Clean 02/09/22 0730   Drainage Amount None 02/09/22 0730   Care Cleansed with:;Soap and water 01/14/22 1000   Dressing Applied;Other (comment) 01/14/22 1000       [REMOVED]      Incision/Site 02/21/22 1318 Right Leg (Removed)   02/21/22 1318   Present Prior to Hospital Arrival?:    Side: Right   Location: Leg   Orientation:    Incision Type:    Closure Method:    Additional Comments:    Removal Indication and Assessment: site change   Wound Outcome:    Removal Indications:    Removed 04/14/22 1145   Wound Image    04/05/22 1130   Incision WDL WDL 04/12/22 0715   Dressing Appearance Open to air 04/12/22 0715   Drainage Amount None 04/12/22 0715   Drainage  Characteristics/Odor Serosanguineous;No odor 03/22/22 1300   Appearance Pink;Dry 04/11/22 1000   Black (%), Wound Tissue Color 0 % 04/05/22 1130   Red (%), Wound Tissue Color 100 % 04/05/22 1130   Yellow (%), Wound Tissue Color 0 % 04/05/22 1130   Periwound Area Dry 04/11/22 1000   Wound Edges Defined 04/11/22 1000   Care Soap and water;Cleansed with: 04/12/22 0715   Dressing Gauze;Other (comment) 03/08/22 1300   Dressing Change Due 02/28/22 02/24/22 1540       [REMOVED]      Incision/Site 02/21/22 1318 Abdomen (Removed)   02/21/22 1318   Present Prior to Hospital Arrival?:    Side:    Location: Abdomen   Orientation:    Incision Type:    Closure Method:    Additional Comments:    Removal Indication and Assessment: site change   Wound Outcome:    Removal Indications:    Removed 02/23/22 1330   Dressing Appearance Dry;Intact 02/23/22 0000   Appearance Dressing in place, unable to visualize 02/23/22 0000       [REMOVED]      Incision/Site 03/09/22 1444 Abdomen (Removed)   03/09/22 1444   Present Prior to Hospital Arrival?:    Side:    Location: Abdomen   Orientation:    Incision Type:    Closure Method:    Additional Comments:    Removal Indication and Assessment:    Wound Outcome: Healed   Removal Indications:    Removed 04/14/22 1145   Wound Image    04/07/22 1415   Incision WDL WDL 04/12/22 0715   Dressing Appearance Open to air 04/12/22 0715   Drainage Amount None 04/12/22 0715   Drainage Characteristics/Odor Serosanguineous;No odor 03/22/22 1300   Appearance Dry;Intact 04/11/22 1000   Black (%), Wound Tissue Color 0 % 04/11/22 1000   Red (%), Wound Tissue Color 100 % 04/11/22 1000   Yellow (%), Wound Tissue Color 0 % 04/11/22 1000   Periwound Area Dry 04/11/22 1000   Wound Edges Defined 04/11/22 1000   Care Cleansed with:;Soap and water 04/12/22 0715   Dressing Removed;Applied;Changed;Non-adherent;Other (comment) 03/22/22 1300       [REMOVED]      Incision/Site 03/25/22 1008 Buttocks (Removed)   03/25/22 1008    Present Prior to Hospital Arrival?:    Side:    Location: Buttocks   Orientation:    Incision Type:    Closure Method:    Additional Comments:    Removal Indication and Assessment: site change   Wound Outcome:    Removal Indications:    Removed 04/14/22 1145   Incision WDL ex 04/13/22 0730   Dressing Appearance Dry;Intact 04/14/22 0738   Drainage Amount None 04/13/22 0730   Appearance Dressing in place, unable to visualize 04/14/22 2000   Care Cleansed with:;Antimicrobial agent;Wound cleanser 04/10/22 1600   Dressing Changed;Gauze, wet to dry 04/10/22 1600   Packing packed with;strip gauze 04/10/22 1600   Dressing Change Due 04/11/22 04/10/22 1600       [REMOVED]      Incision/Site 04/13/22 1052 Left Leg (Removed)   04/13/22 1052   Present Prior to Hospital Arrival?:    Side: Left   Location: Leg   Orientation:    Incision Type:    Closure Method:    Additional Comments:    Removal Indication and Assessment:    Wound Outcome:    Removal Indications:    Removed 04/14/22 1145   Appearance Dressing in place, unable to visualize 04/14/22 2000       [REMOVED]      Incision/Site 04/13/22 1052 Right Leg (Removed)   04/13/22 1052   Present Prior to Hospital Arrival?:    Side: Right   Location: Leg   Orientation:    Incision Type:    Closure Method:    Additional Comments:    Removal Indication and Assessment: site change   Wound Outcome:    Removal Indications:    Removed 04/14/22 1145   Appearance Dressing in place, unable to visualize 04/14/22 2000       [REMOVED]      Incision/Site 04/13/22 1052 Sacral Spine (Removed)   04/13/22 1052   Present Prior to Hospital Arrival?:    Side:    Location: Sacral Spine   Orientation:    Incision Type:    Closure Method:    Additional Comments:    Removal Indication and Assessment: site change   Wound Outcome:    Removal Indications:    Removed 04/14/22 1145   Dressing Appearance Dry;Clean;Intact 04/14/22 0738   Appearance Dressing in place, unable to visualize 04/14/22 2000        Assessment and Plan      Wound Assessment:  1.  Sacral wound   2.  Lower extremity wounds     Wound Care Plan:   Clean with vashe.   Apply santyl to a vashe moistened 4x4  Cover and secure dry dressing.   Change daily and PRN for soilage.   Turn every 2 hours  Low air loss mattress  TAP system  Keep pressure off wounds  Waffle boots  Bone culture and pathology collected today          Signature:  HERSON Viveros  Wound Care    Date of encounter: 05/19/2022

## 2022-05-19 NOTE — SUBJECTIVE & OBJECTIVE
Interval History: No acute events overnight. The patient is afebrile this am. He is currently resting comfortably. Oxygenating adequately. He is on trach collar during the day and cpap overnight.    Objective:     Vital Signs (Most Recent):  Temp: (!) 100.4 °F (38 °C) (05/19/22 0300)  Pulse: (!) 112 (05/19/22 1323)  Resp: 19 (05/19/22 1323)  BP: 103/60 (05/19/22 1338)  SpO2: 100 % (05/19/22 1323)   Vital Signs (24h Range):  Temp:  [100.3 °F (37.9 °C)-100.5 °F (38.1 °C)] 100.4 °F (38 °C)  Pulse:  [] 112  Resp:  [8-37] 19  SpO2:  [91 %-100 %] 100 %  BP: ()/(45-60) 103/60     Weight: 76.7 kg (169 lb 1.5 oz)  Body mass index is 23.58 kg/m².      Intake/Output Summary (Last 24 hours) at 5/19/2022 1641  Last data filed at 5/19/2022 0735  Gross per 24 hour   Intake 920 ml   Output 200 ml   Net 720 ml         Physical Exam    Vents:  Vent Mode: CPAP / PSV (05/19/22 0800)  Ventilator Initiated: No (04/23/22 1934)  Set Rate: 0 BPM (05/18/22 0516)  Vt Set: 0 mL (05/18/22 0516)  Pressure Support: 15 cmH20 (05/19/22 0800)  PEEP/CPAP: 5 cmH20 (05/19/22 0800)  Oxygen Concentration (%): 30 (05/19/22 1323)  Peak Airway Pressure: 20 cmH2O (05/19/22 0800)  Plateau Pressure: 20 cmH20 (03/07/22 0500)  Total Ve: 15.9 mL (05/19/22 0800)  F/VT Ratio<105 (RSBI): (!) 92.9 (05/19/22 0800)    Lines/Drains/Airways       Central Venous Catheter Line  Duration                  Hemodialysis Catheter 12/30/21 0900 right internal jugular 140 days              Drain  Duration                  Colostomy 12/18/21 1030 Descending/sigmoid  days         Gastrostomy/Enterostomy 03/09/22 1436 Percutaneous endoscopic gastrostomy (PEG) midline feeding 71 days              Airway  Duration                  Surgical Airway 04/27/22 0856 Marychuy;Other (Comment) Cuffed;Long 22 days              Peripheral Intravenous Line  Duration                  Peripheral IV - Single Lumen 04/11/22 1623 18 G Anterior;Proximal;Right Forearm 38 days                     Significant Labs:    CBC/Anemia Profile:  No results for input(s): WBC, HGB, HCT, PLT, MCV, RDW, IRON, FERRITIN, RETIC, FOLATE, [id number], OCCULTBLOOD in the last 48 hours.       Chemistries:  Recent Labs   Lab 05/18/22  1216      K 3.6      CO2 27   BUN 17   CREATININE 0.95   CALCIUM 8.7   ALBUMIN 1.4*   PROT 7.6   BILITOT 0.2   ALKPHOS 490*   ALT 55   AST 34         All pertinent labs within the past 24 hours have been reviewed.    Significant Imaging:  I have reviewed all pertinent imaging results/findings within the past 24 hours.

## 2022-05-19 NOTE — ASSESSMENT & PLAN NOTE
Clean wound with vashe  Apply sensicare around wound  Apply santyl naz thick to wound bed, cover with vashe/saline moistened 4x4  Cover and secure with abd pad and paper tape  Change daily  Turn every two hours  Low air loss mattress  Keep pressure off wound  TAP system   Bedside debridements weekly and PRN  X-ray on 5/18, suspicious for osteomyelitis, consult surgery and ID

## 2022-05-19 NOTE — CONSULTS
Progress Note                                                           Infectious disease   Admit Date: 12/23/2021    SUBJECTIVE:     Follow-up For:  Denice gangrene    HPI/Interval history:  This is a patient in no from previous consults during the current prolonged hospital stay (began December last year).  I am consulted again due to recent right lower extremity x-ray showing osteomyelitis.  Patient has been having low-grade fevers to 100.6 over the past 3 days.  I am asked to advise on antibiotic therapy.  He is not currently on antibiotics (Zosyn stopped 5/17).    Review of Systems:    unable to obtain as patient nonverbal      OBJECTIVE:     Vital Signs Range (Last 24H):  Temp:  [100.2 °F (37.9 °C)-100.5 °F (38.1 °C)]   Pulse:  []   Resp:  [8-45]   BP: ()/(45-60)   SpO2:  [91 %-100 %]     Physical Exam:  Constitutional:  No acute distress, makes eye contact but nonverbal  HENT: supple   Head: normocephalic, atraumatic   Eyes: Conjunctivae and pupil normal of the left, enucleated right eye ball. nd accomodation, no drainage   Cardiovascular: regular rate and rhythm, S1, S2 normal, no murmur, click, rub or gallop  Pulmonary: normal respiratory effort on trach collar with small amount of mucoid secretions, no crepitations or wheezing appreciated  Gastrointestinal:  Colostomy and PEG present, bowel sounds normal; non-tender, no masses or organomegaly appreciated  Muscular/Skeletal: Lower extremities without cyanosis or edema, no clubbing.  Multiple wounds to feet, worse being to left heel, right lateral ankle and distal leg.  He also has a large sacral decubitus ulcer.  Skin: Skin color, texture normal. No rashes, ulcers or new lesions. Skin warm and dry.     Laboratory:  CBC:   Recent Labs   Lab 05/16/22  0632   WBC 15.19*   RBC 2.45*   HGB 7.7*   HCT 22.9*   *   MCV 93.5   MCH 31.4*   MCHC 33.6     BMP:   Recent Labs   Lab 05/18/22  1216   *      K 3.6      CO2 27   BUN  17   CREATININE 0.95   CALCIUM 8.7     CMP:   Recent Labs   Lab 05/18/22  1216   *   CALCIUM 8.7   ALBUMIN 1.4*   PROT 7.6      K 3.6   CO2 27      BUN 17   CREATININE 0.95   ALKPHOS 490*   ALT 55   AST 34   BILITOT 0.2     Microbiology Results (last 7 days)     Procedure Component Value Units Date/Time    Blood culture [[id number]]     Order Status: Sent Specimen: Blood     Blood culture [[id number]]     Order Status: Sent Specimen: Blood     Blood culture [[id number]]  (Abnormal)  (Susceptibility) Collected: 05/08/22 0628    Order Status: Completed Specimen: Blood Updated: 05/16/22 0542     Culture, Blood Coagulase-negative Staphylococcus species     Gram Stain Result Gram positive cocci     Comment: From Anaerobe bottle  This is an appended report. These results have been appended to a previously preliminary verified report.       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) [I69.398, R26.9]  Not Applicable      Resolved Hospital Problems    Diagnosis Date Resolved POA    Hypotension [I95.9] 04/22/2022 Unknown    Vomiting [R11.10] 03/02/2022 No    Open wound of right hand without foreign body [S61.401A] 05/19/2022 Unknown    Pressure ulcer of left heel, unstageable [L89.620] 05/19/2022 Unknown    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 Osteomyelitis to right 5th digit  2. Osteomyelitis to the distal right fibula  3. Recent low-grade fevers  4. Multiple decubitus ulcers including large wound to sacrum infected recently with multidrug resistant organisms including a carbapenem resistant Gram-negative  5. Prolonged hospital stay    PLAN:  1. Blood cultures x2 sets; also recheck CBC  2. If possible send samples of exposed bone for culture and sensitivity.  Patient has had infections with multidrug resistant organisms and it would be best to identify the organisms causing his osteomyelitis in other to target therapy accordingly.    3. Hold antibiotics pending bone cultures    Thank you very much for asking me to see this patient again.  Will follow.

## 2022-05-19 NOTE — PROGRESS NOTES
[facility]  Pulmonology  Progress Note    Patient Name: Og Garcia  MRN: [id number]  Admission Date: 12/23/2021  Hospital Length of Stay: 147 days  Code Status: Prior  Attending Provider: Cecil Abernathy DO  Primary Care Provider: Hector Arndt DNP, FNP-C   Principal Problem: Denice gangrene    Subjective:     Interval History: No acute events overnight. The patient is afebrile this am. He is currently resting comfortably. Oxygenating adequately. He is on trach collar during the day and cpap overnight.    Objective:     Vital Signs (Most Recent):  Temp: (!) 100.4 °F (38 °C) (05/19/22 0300)  Pulse: (!) 112 (05/19/22 1323)  Resp: 19 (05/19/22 1323)  BP: 103/60 (05/19/22 1338)  SpO2: 100 % (05/19/22 1323)   Vital Signs (24h Range):  Temp:  [100.3 °F (37.9 °C)-100.5 °F (38.1 °C)] 100.4 °F (38 °C)  Pulse:  [] 112  Resp:  [8-37] 19  SpO2:  [91 %-100 %] 100 %  BP: ()/(45-60) 103/60     Weight: 76.7 kg (169 lb 1.5 oz)  Body mass index is 23.58 kg/m².      Intake/Output Summary (Last 24 hours) at 5/19/2022 1641  Last data filed at 5/19/2022 0735  Gross per 24 hour   Intake 920 ml   Output 200 ml   Net 720 ml         Physical Exam    Vents:  Vent Mode: CPAP / PSV (05/19/22 0800)  Ventilator Initiated: No (04/23/22 1934)  Set Rate: 0 BPM (05/18/22 0516)  Vt Set: 0 mL (05/18/22 0516)  Pressure Support: 15 cmH20 (05/19/22 0800)  PEEP/CPAP: 5 cmH20 (05/19/22 0800)  Oxygen Concentration (%): 30 (05/19/22 1323)  Peak Airway Pressure: 20 cmH2O (05/19/22 0800)  Plateau Pressure: 20 cmH20 (03/07/22 0500)  Total Ve: 15.9 mL (05/19/22 0800)  F/VT Ratio<105 (RSBI): (!) 92.9 (05/19/22 0800)    Lines/Drains/Airways       Central Venous Catheter Line  Duration                  Hemodialysis Catheter 12/30/21 0900 right internal jugular 140 days              Drain  Duration                  Colostomy 12/18/21 1030 Descending/sigmoid  days         Gastrostomy/Enterostomy 03/09/22 1436  Percutaneous endoscopic gastrostomy (PEG) midline feeding 71 days              Airway  Duration                  Surgical Airway 04/27/22 0856 Shiley;Other (Comment) Cuffed;Long 22 days              Peripheral Intravenous Line  Duration                  Peripheral IV - Single Lumen 04/11/22 1623 18 G Anterior;Proximal;Right Forearm 38 days                    Significant Labs:    CBC/Anemia Profile:  No results for input(s): WBC, HGB, HCT, PLT, MCV, RDW, IRON, FERRITIN, RETIC, FOLATE, [id number], OCCULTBLOOD in the last 48 hours.       Chemistries:  Recent Labs   Lab 05/18/22  1216      K 3.6      CO2 27   BUN 17   CREATININE 0.95   CALCIUM 8.7   ALBUMIN 1.4*   PROT 7.6   BILITOT 0.2   ALKPHOS 490*   ALT 55   AST 34         All pertinent labs within the past 24 hours have been reviewed.    Significant Imaging:  I have reviewed all pertinent imaging results/findings within the past 24 hours.    Assessment/Plan:     Chronic respiratory failure  - previously on trach collar, regressed  - doing well with trials  - can progress to trach collar continuous if tolerate  - may need PSV for rest overnight  5/19- currently doing well with trach collar during the day and PSV overnight    Pressure ulcer of sacral region, stage 4  Wound care is following the patient  Their help reviewed and appreciated    Fever  - febrile with leukocytosis  - on 5/8 patient is felt to have aspirated overnight. Started on zosyn  - Sputum culture with Pseudomonas sensitive to zosyn. Will continue for 7 days   completed  1 blood culture positive for coag negative staph which is felt to be a contaminant    ESRD (end stage renal disease)  Started on HD 12/30   Continue with HD per nephrology      Type 2 diabetes mellitus without complication, without long-term current use of insulin  Blood sugar is well controlled    Acute blood loss anemia  No active bleeding  Hgb 6.0 on 4/11  Patient transfused 1 unit with dialysis yesterday  4/13 -H/H  7.5/22.7  4/15- hgb is 6.2 today. We are going to give another unit of PRBC's today with dialysis  4/16 post transfusion cbc is pending  4/18- hgb is 6.6. Will plan for transfusion with 1 unit of prbc's with next HD  4/21 - 7.2/21.6   4/25- 6.6/19.7 - will transfuse one unit PRBCs with next HD   4/28- Hgb now 8.0 following transfusion  5/2/2022 hemoglobin 7 no need for transfusion yet  5/3/2022 transfuse 1 unit packed red blood cells  05/05/2022 I believe this anemia is probably related to his chronic renal sufficiency a wonder if he is a candidate for Epogen at this point  5/10- transfused 1 unit yesterday  5/12 repeat H&H in am  05/13/2022 in 2 H&H pending  Most recent 7.7/22.9    Necrotizing fasciitis  S/p multiple surgeries   Last debridement for wounds was 4/13      Hypertension  BP is stable  Currently on rate control medications (cardizem 90 mg q6 and metoprolol 12.5 bid)                     Cecil Abernathy, DO  Pulmonology  [facility]

## 2022-05-19 NOTE — PROGRESS NOTES
[facility] HOW  Adult Nutrition  Follow-up Note         Reason for Assessment  Reason For Assessment: RD follow-up  Nutrition Risk Screen: tube feeding or parenteral nutrition, large or nonhealing wound, burn or pressure injury  Malnutrition  Is Patient Malnourished: No  Nutrition Diagnosis  Increased protein Needs   related to Decreased/ impaired skin integrity as evidenced by wounds    Nutrition Diagnosis Status: Improving      Nutrition Risk  Level of Risk/Frequency of Follow-up: high   Chewing or Swallowing Difficulty?: Swallowing difficulty  Estimated/Assessed Needs  RMR (Ivanhoe-St. Jeor Equation): 1564.13 Activity Factor: 1 Injury Factor: 1.2   Total Ve: 15.9 mL Temp: (!) 100.4 °F (38 °C)Axillary  Weight Used For Calorie Calculations: 78.3 kg (172 lb 9.9 oz)   Energy Need Method: [facility] Energy Calorie Requirements (kcal): 1608  Weight Used For Protein Calculations: 73.6 kg (162 lb 4.1 oz)  Protein Requirements:   Estimated Fluid Requirement Method: RDA Method Fluid Requirements (mL): 2105  RDA Method (mL): 1608     Nutrition Prescription / Recommendations  Recommendation/Intervention: PEG tube feeding: Nepro @60ml/hr; H20 flush of 50ml q 4hr  Goals: pt will meet est nutr needs; wound healing, TF tolerance  Nutrition Goal Status: progressing towards goal  Communication of RD Recs: discussed on rounds  Current Diet Order: PEG tube feeding: Nepro @60ml/hr; H20 flush of 50ml q 4hr  Nutrition Order Comments: PEG tube feeding:Nepro @ 60ml/hr; H20 flush of 50ml q 4hr  Current Nutrition Support Formula Ordered: Nepro  Current Nutrition Support Rate Ordered: 60 (ml)  Current Nutrition Support Frequency Ordered: hourly  Recommended Diet: Enteral Nutrition PEG tube feeding: Nepro @60ml/hr; H20 flush of 50ml q 4hr  Recommended Oral Supplement: Matthew [90 kcals, 2.5g Protein, 10g Carbs(3g Sugar), 7g L-Arginine, 7g L-Glutamine, Vitamin C 300mg, 9.5mg Zinc] twice daily  Is Nutrition Support  Patient is a 34y old  Male who presents with a chief complaint of syncope during dialysis (08 Apr 2018 09:49)      SUBJECTIVE / OVERNIGHT EVENTS:   Feels better.  Denies CP/SOB/Palpitation/HA.    MEDICATIONS  (STANDING):  amLODIPine   Tablet 2.5 milliGRAM(s) Oral daily  baclofen 10 milliGRAM(s) Oral once  heparin  Injectable 5000 Unit(s) SubCutaneous every 12 hours  hydrocortisone sodium succinate Injectable 25 milliGRAM(s) IV Push every 8 hours  metoprolol tartrate 12.5 milliGRAM(s) Oral two times a day  Nephro-kenneth 1 Tablet(s) Oral daily    MEDICATIONS  (PRN):  acetaminophen   Tablet 650 milliGRAM(s) Oral every 6 hours PRN For Temp greater than 38 C (100.4 F)  acetaminophen   Tablet. 650 milliGRAM(s) Oral every 6 hours PRN Moderate Pain (4 - 6)  ondansetron Injectable 4 milliGRAM(s) IV Push every 6 hours PRN Nausea and/or Vomiting        CAPILLARY BLOOD GLUCOSE        I&O's Summary    16 Apr 2018 07:01  -  17 Apr 2018 07:00  --------------------------------------------------------  IN: 780 mL / OUT: 0 mL / NET: 780 mL    17 Apr 2018 07:01  -  17 Apr 2018 22:30  --------------------------------------------------------  IN: 720 mL / OUT: 1000 mL / NET: -280 mL        PHYSICAL EXAM:  GENERAL: NAD, well-developed  HEAD:  Atraumatic, Normocephalic  NECK: Supple, No JVD  CHEST/LUNG: Clear to auscultation bilaterally; No wheezing.  HEART: Regular rate and rhythm; No murmurs, rubs, or gallops  ABDOMEN: Soft, Nontender, Nondistended; Bowel sounds present  EXTREMITIES:   No clubbing, cyanosis, or edema  NEUROLOGY: AAO X 3  SKIN: No rashes    LABS:                        10.5   15.11 )-----------( 290      ( 16 Apr 2018 09:27 )             29.3     04-17    137  |  92<L>  |  102<H>  ----------------------------<  104<H>  4.2   |  25  |  16.63<H>    Ca    10.0      17 Apr 2018 06:37  Phos  2.8     04-16  Mg     2.6     04-16              CAPILLARY BLOOD GLUCOSE        04-14 @ 08:55  Culture-urine --  Culture results   No growth to date.  method type --  Organism --  Organism Identification --  Specimen source .Blood Blood-Peripheral  04-13 @ 08:14  Culture-urine --  Culture results   No Protozoa seen by trichrome stain  No Helminths or Protozoa seen in formalin concentrate  performed by iodine stain  (routine O+P not evaluated for Microsporidia,  Cryptosporidia, Cyclospora, or Isospora.)  Note: One negative specimen does not rule  out the possibility of a parasitic infection.  method type --  Organism --  Organism Identification --  Specimen source .Stool Feces  04-13 @ 00:45  Culture-urine --  Culture results   No growth at 48 hours  method type --  Organism --  Organism Identification --  Specimen source .Catheter Catheter Tip Internal Jugular  04-12 @ 18:01  Culture-urine --  Culture results   No growth at 5 days.  method type --  Organism --  Organism Identification --  Specimen source .Blood Blood-Peripheral  04-12 @ 11:17  Culture-urine --  Culture results   No growth at 5 days.  method type --  Organism --  Organism Identification --  Specimen source .Blood Blood-Peripheral           04-14 @ 08:55  Culture blood --  Culture results   No growth to date.  Gram stain --  Gram stain blood --  Method type --  Organism --  Organism identification --  Specimen source .Blood Blood-Peripheral   04-13 @ 08:14  Culture blood --  Culture results   No Protozoa seen by trichrome stain  No Helminths or Protozoa seen in formalin concentrate  performed by iodine stain  (routine O+P not evaluated for Microsporidia,  Cryptosporidia, Cyclospora, or Isospora.)  Note: One negative specimen does not rule  out the possibility of a parasitic infection.  Gram stain --  Gram stain blood --  Method type --  Organism --  Organism identification --  Specimen source .Stool Feces   04-13 @ 00:45  Culture blood --  Culture results   No growth at 48 hours  Gram stain --  Gram stain blood --  Method type --  Organism --  Organism identification --  Specimen source .Catheter Catheter Tip Internal Jugular   04-12 @ 18:01  Culture blood --  Culture results   No growth at 5 days.  Gram stain --  Gram stain blood --  Method type --  Organism --  Organism identification --  Specimen source .Blood Blood-Peripheral   04-12 @ 11:17  Culture blood --  Culture results   No growth at 5 days.  Gram stain --  Gram stain blood --  Method type --  Organism --  Organism identification --  Specimen source .Blood Blood-Peripheral      RADIOLOGY & ADDITIONAL TESTS:    Imaging Personally Reviewed:    Consultant(s) Notes Reviewed:      Care Discussed with Consultants/Other Providers: Recommended: No  Is Education Recommended: No  Monitor and Evaluation  % current Intake: Enteral Nutrition at goal  % intake to meet estimated needs: Enteral Nutrition   Food and Nutrient Intake: enteral nutrition intake  Food and Nutrient Adminstration: enteral and parenteral nutrition administration  Anthropometric Measurements: height/length, weight, weight change, body mass index  Biochemical Data, Medical Tests and Procedures: glucose/endocrine profile, electrolyte and renal panel  Nutrition-Focused Physical Findings: skin  Enteral Calories (kcal): 2592  Enteral Protein (gm): 115  Enteral (Free Water) Fluid (mL): 1037  Free Water Flush Fluid (mL): 300  Parenteral Calories (kcal): 845  Parenteral Protein (gm): 48  Parenteral Fluid (mL): 960  Lipid Calories (kcals): 500 kcals  Other Calories (kcal): 528 (propofol)  Total Calories (kcal): 2160  Total Calories (kcal/kg): 1337  % Kcal Needs: 100  Total Protein (gm): 135  % Protein Needs: 100  IV Fluid (mL): 1337  Total Fluid Intake (mL): 1337  Energy Calories Required: meeting needs  Protein Required: meeting needs  Fluid Required: meeting needs  Tolerance: tolerating  Current Medical Diagnosis and Past Medical History  Diagnosis: renal disease, gastrointestinal disease, infection/sepsis  Past Medical History:   Diagnosis Date    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    Hyperlipidemia     Hypertension     On mechanically assisted ventilation 12/14/2021    Pressure ulcer of left heel, unstageable      Nutrition/Diet History  Spiritual, Cultural Beliefs, Jewish Practices, Values that Affect Care: no  Supplemental Drinks or Food Habits: Matthew, Nepro  Food Allergies: NKFA  Factors Affecting Nutritional Intake: None identified at this time  Lab/Procedures/Meds  Recent Labs   Lab 05/18/22  1216      K 3.6   BUN 17   CREATININE 0.95   *   CALCIUM 8.7   ALBUMIN 1.4*      ALT 55   AST 34     Last A1c: No results found for: HGBA1C  Lab Results  "  Component Value Date    RBC 2.45 (L) 05/16/2022    HGB 7.7 (L) 05/16/2022    HCT 22.9 (L) 05/16/2022    MCV 93.5 05/16/2022    MCH 31.4 (H) 05/16/2022    MCHC 33.6 05/16/2022    TIBC 152 (L) 05/04/2022     Pertinent Labs Reviewed: reviewed  Pertinent Labs Comments: na 133, k 3.7, crea 2.85, GFR 24  Pertinent Medications Reviewed: reviewed  Pertinent Medications Comments: heparin, insulin  Anthropometrics  Temp: (!) 100.4 °F (38 °C)  Height Method: Stated  Height: 5' 11" (180.3 cm)  Height (inches): 71 in  Weight Method: Bed Scale  Weight: 76.7 kg (169 lb 1.5 oz)  Weight (lb): 169.09 lb  Ideal Body Weight (IBW), Male: 172 lb  % Ideal Body Weight, Male (lb): 94.34 %  BMI (Calculated): 23.6  BMI Grade: 18.5-24.9 - normal     Nutrition by Nursing  Diet/Nutrition Received: tube feeding  Intake (%): 100%  Diet/Feeding Assistance: total feed  Diet/Feeding Tolerance: good  Last Bowel Movement: 05/18/22  [REMOVED]      NG/OG Tube [place] sump 18 Fr. Left nostril-Feeding Type: continuous, by pump       Gastrostomy/Enterostomy 03/09/22 1436 Percutaneous endoscopic gastrostomy (PEG) midline feeding-Feeding Type: continuous, by pump  [REMOVED]      NG/OG Tube [place] sump 18 Fr. Left nostril-Current Rate (mL/hr): 50 mL/hr       Gastrostomy/Enterostomy 03/09/22 1436 Percutaneous endoscopic gastrostomy (PEG) midline feeding-Current Rate (mL/hr): 60 mL/hr  [REMOVED]      NG/OG Tube [place] sump 18 Fr. Left nostril-Goal Rate (mL/hr): 75 mL/hr       Gastrostomy/Enterostomy 03/09/22 1436 Percutaneous endoscopic gastrostomy (PEG) midline feeding-Goal Rate (mL/hr): 60 mL/hr  [REMOVED]      NG/OG Tube [place] sump 18 Fr. Left nostril-Formula Name: nepro 1.8       Gastrostomy/Enterostomy 03/09/22 1436 Percutaneous endoscopic gastrostomy (PEG) midline feeding-Formula Name: Nepro  Nutrition Follow-Up  RD Follow-up?: Yes  Assessment and Plan  No new Assessment & Plan notes have been filed under this hospital service since the last note was " Patient is a 34y old  Male who presents with a chief complaint of syncope during dialysis (08 Apr 2018 09:49)      SUBJECTIVE / OVERNIGHT EVENTS:  Hiccups  Denies CP/SOB/Palpitation/HA.    MEDICATIONS  (STANDING):  amLODIPine   Tablet 2.5 milliGRAM(s) Oral daily  baclofen 10 milliGRAM(s) Oral once  heparin  Injectable 5000 Unit(s) SubCutaneous every 12 hours  hydrocortisone sodium succinate Injectable 25 milliGRAM(s) IV Push every 8 hours  metoprolol tartrate 12.5 milliGRAM(s) Oral two times a day  Nephro-kenneth 1 Tablet(s) Oral daily    MEDICATIONS  (PRN):  acetaminophen   Tablet 650 milliGRAM(s) Oral every 6 hours PRN For Temp greater than 38 C (100.4 F)  acetaminophen   Tablet. 650 milliGRAM(s) Oral every 6 hours PRN Moderate Pain (4 - 6)  ondansetron Injectable 4 milliGRAM(s) IV Push every 6 hours PRN Nausea and/or Vomiting        CAPILLARY BLOOD GLUCOSE        I&O's Summary    16 Apr 2018 07:01  -  17 Apr 2018 07:00  --------------------------------------------------------  IN: 780 mL / OUT: 0 mL / NET: 780 mL    17 Apr 2018 07:01  -  17 Apr 2018 22:30  --------------------------------------------------------  IN: 720 mL / OUT: 1000 mL / NET: -280 mL        PHYSICAL EXAM:  GENERAL: NAD, well-developed  HEAD:  Atraumatic, Normocephalic  NECK: Supple, No JVD  CHEST/LUNG: Clear to auscultation bilaterally; No wheezing.  HEART: Regular rate and rhythm; No murmurs, rubs, or gallops  ABDOMEN: Soft, Nontender, Nondistended; Bowel sounds present  EXTREMITIES:   No clubbing, cyanosis, or edema  NEUROLOGY: AAO X 3  SKIN: No rashes    LABS:                        10.5   15.11 )-----------( 290      ( 16 Apr 2018 09:27 )             29.3     04-17    137  |  92<L>  |  102<H>  ----------------------------<  104<H>  4.2   |  25  |  16.63<H>    Ca    10.0      17 Apr 2018 06:37  Phos  2.8     04-16  Mg     2.6     04-16              CAPILLARY BLOOD GLUCOSE        04-14 @ 08:55  Culture-urine --  Culture results   No growth to date.  method type --  Organism --  Organism Identification --  Specimen source .Blood Blood-Peripheral  04-13 @ 08:14  Culture-urine --  Culture results   No Protozoa seen by trichrome stain  No Helminths or Protozoa seen in formalin concentrate  performed by iodine stain  (routine O+P not evaluated for Microsporidia,  Cryptosporidia, Cyclospora, or Isospora.)  Note: One negative specimen does not rule  out the possibility of a parasitic infection.  method type --  Organism --  Organism Identification --  Specimen source .Stool Feces  04-13 @ 00:45  Culture-urine --  Culture results   No growth at 48 hours  method type --  Organism --  Organism Identification --  Specimen source .Catheter Catheter Tip Internal Jugular  04-12 @ 18:01  Culture-urine --  Culture results   No growth at 5 days.  method type --  Organism --  Organism Identification --  Specimen source .Blood Blood-Peripheral  04-12 @ 11:17  Culture-urine --  Culture results   No growth at 5 days.  method type --  Organism --  Organism Identification --  Specimen source .Blood Blood-Peripheral           04-14 @ 08:55  Culture blood --  Culture results   No growth to date.  Gram stain --  Gram stain blood --  Method type --  Organism --  Organism identification --  Specimen source .Blood Blood-Peripheral   04-13 @ 08:14  Culture blood --  Culture results   No Protozoa seen by trichrome stain  No Helminths or Protozoa seen in formalin concentrate  performed by iodine stain  (routine O+P not evaluated for Microsporidia,  Cryptosporidia, Cyclospora, or Isospora.)  Note: One negative specimen does not rule  out the possibility of a parasitic infection.  Gram stain --  Gram stain blood --  Method type --  Organism --  Organism identification --  Specimen source .Stool Feces   04-13 @ 00:45  Culture blood --  Culture results   No growth at 48 hours  Gram stain --  Gram stain blood --  Method type --  Organism --  Organism identification --  Specimen source .Catheter Catheter Tip Internal Jugular   04-12 @ 18:01  Culture blood --  Culture results   No growth at 5 days.  Gram stain --  Gram stain blood --  Method type --  Organism --  Organism identification --  Specimen source .Blood Blood-Peripheral   04-12 @ 11:17  Culture blood --  Culture results   No growth at 5 days.  Gram stain --  Gram stain blood --  Method type --  Organism --  Organism identification --  Specimen source .Blood Blood-Peripheral      RADIOLOGY & ADDITIONAL TESTS:    Imaging Personally Reviewed:    Consultant(s) Notes Reviewed:      Care Discussed with Consultants/Other Providers: generated.  Service: Nutrition

## 2022-05-19 NOTE — ASSESSMENT & PLAN NOTE
- previously on trach collar, regressed  - doing well with trials  - can progress to trach collar continuous if tolerate  - may need PSV for rest overnight  5/19- currently doing well with trach collar during the day and PSV overnight

## 2022-05-19 NOTE — ASSESSMENT & PLAN NOTE
Dialysis MWF  5/16/2022 Continue with scheduled hemodialysis.  5/17/2022  Dialysis as scheduled  5/19/2022 Dialysis sessions are acceptable.

## 2022-05-19 NOTE — ASSESSMENT & PLAN NOTE
Clean wound with vashe  Apply sensicare around wound  Apply santyl naz thick to wound bed, cover with vashe/saline moistened 4x4  Cover and secure with abd pad and paper tape  Change daily  Turn every two hours  Low air loss mattress  Keep pressure off wound  TAP system   Bedside debridements weekly and PRN  X-ray on 5/18 evidence of osteomyelitis and possible gas, consult surgery and ID  Arterial doppler done

## 2022-05-20 NOTE — ANESTHESIA PREPROCEDURE EVALUATION
05/20/2022  Og Garcia is a 67 y.o., male. Denice's gangrene    Pre-op Assessment    I have reviewed the Patient Summary Reports.    I have reviewed the Nursing Notes. I have reviewed the NPO Status.   I have reviewed the Medications.     Review of Systems  Anesthesia Hx:  No problems with previous Anesthesia    Social:  Non-Smoker, No Alcohol Use    Hematology/Oncology:  Hematology Normal   Oncology Normal   Hematology Comments: Disseminated Mucormycosis    EENT/Dental:EENT/Dental Normal   Cardiovascular:   Hypertension    Pulmonary:  Pulmonary Normal Respiratory failure / vent dependent    trach   Renal/:   Chronic Renal Disease, ESRD, Dialysis    Hepatic/GI:  Hepatic/GI Normal    Musculoskeletal:  Musculoskeletal Normal Denice gangrene   Neurological:   CVA Responds to pain Dementia    Endocrine:   Diabetes, poorly controlled, type 2    Dermatological:  Skin Normal    Psych:  Psychiatric Normal                                                                                                                    05/20/2022  Og Garcia is a 67 y.o., male. Denice's gangrene    Pre-op Assessment    I have reviewed the Patient Summary Reports.     I have reviewed the Nursing Notes. I have reviewed the NPO Status.   I have reviewed the Medications.     Review of Systems  Anesthesia Hx:  No problems with previous Anesthesia    Social:  Non-Smoker, No Alcohol Use    Hematology/Oncology:  Hematology Normal   Oncology Normal     EENT/Dental:EENT/Dental Normal   Cardiovascular:   Hypertension Sepsis / on levophed   Pulmonary:  Pulmonary Normal Respiratory failure on vent   Renal/:   Chronic Renal Disease, ESRD, Dialysis    Hepatic/GI:  Hepatic/GI Normal Denice Gangrene, necrotizing fasciitis -  s/p multiple debridements   Musculoskeletal:  Musculoskeletal Normal    Neurological:  Neurology Normal     Endocrine:  Endocrine Normal    Dermatological:  Skin Normal    Psych:  Psychiatric Normal           Physical Exam  General:  Well nourished    Airway/Jaw/Neck:  Airway Findings: Mouth Opening: Normal General Airway Assessment: Adult  Mallampati: III     Eyes/Ears/Nose:  Eyes/Ears/Nose Findings:     Chest/Lungs:  Chest/Lungs Findings: Rales, Basilar     Heart/Vascular:  Heart Findings: Rate: Normal  Rhythm: Regular Rhythm        Mental Status:  Mental Status Findings:  Cooperative, Alert and Oriented         Anesthesia Plan  Type of Anesthesia, risks & benefits discussed:  Anesthesia Type:  general    Patient's Preference:   Plan Factors:          Intra-op Monitoring Plan: standard ASA monitors  Intra-op Monitoring Plan Comments:   Post Op Pain Control Plan: per primary service following discharge from PACU and multimodal analgesia  Post Op Pain Control Plan Comments:     Induction:   IV  Beta Blocker:  Patient is not currently on a Beta-Blocker (No further documentation required).       Informed Consent: Patient understands risks and agrees with Anesthesia plan.  Questions answered. Anesthesia consent signed with patient.  ASA Score: 4  emergent   Day of Surgery Review of History & Physical: I have interviewed and examined the patient. I have reviewed the patient's H&P dated:  There are no significant changes.          Ready For Surgery From Anesthesia Perspective.           Physical Exam  General:  Well nourished      Airway/Jaw/Neck:  Airway Findings: Mouth Opening: Normal   General Airway Assessment: Adult Mallampati: III  Improves to II with phonation.     Eyes/Ears/Nose:  Eyes/Ears/Nose Findings:     Chest/Lungs:  Chest/Lungs Findings: Rales, Basilar      Heart/Vascular:  Heart Findings: Rate: Tachycardia  Rhythm: Regular Rhythm        Mental Status:  Mental Status Findings:  Cooperative, Alert and Oriented         Anesthesia Plan  Type of Anesthesia, risks & benefits discussed:  Anesthesia Type:   general    Patient's Preference:   Plan Factors:          Intra-op Monitoring Plan: standard ASA monitors  Intra-op Monitoring Plan Comments:   Post Op Pain Control Plan: per primary service following discharge from PACU and multimodal analgesia  Post Op Pain Control Plan Comments:     Induction:   IV  Beta Blocker:  Patient is not currently on a Beta-Blocker (No further documentation required).       Informed Consent: Informed consent signed with the Patient and all parties understand the risks and agree with anesthesia plan.  All questions answered.  Anesthesia consent signed with patient.  ASA Score: 4     Day of Surgery Review of History & Physical: I have interviewed and examined the patient. I have reviewed the patient's H&P dated:  There are no significant changes.  H&P Update referred to the surgeon/provider. H&P completed by Anesthesiologist.         Ready For Surgery From Anesthesia Perspective.           Physical Exam  General: Well nourished    Airway:  Mallampati: III / II  Mouth Opening: Normal    Chest/Lungs:  Rales, Basilar    Heart:  Rate: Tachycardia  Rhythm: Regular Rhythm          Anesthesia Plan  Type of Anesthesia, risks & benefits discussed:    Anesthesia Type: general  Intra-op Monitoring Plan: standard ASA monitors  Post Op Pain Control Plan: per primary service following discharge from PACU and multimodal analgesia  Induction:  IV  Informed Consent: Informed consent signed with the Patient and all parties understand the risks and agree with anesthesia plan.  All questions answered.   ASA Score: 4  Day of Surgery Review of History & Physical: H&P Update referred to the surgeon/provider.I have interviewed and examined the patient. I have reviewed the patient's H&P dated: There are no significant changes. H&P completed by Anesthesiologist.    Ready For Surgery From Anesthesia Perspective.       .

## 2022-05-20 NOTE — ANESTHESIA POSTPROCEDURE EVALUATION
Anesthesia Post Evaluation    Patient: [facility]    Procedure(s) Performed: Procedure(s) (LRB):  AMPUTATION, TOE (Right)    Final Anesthesia Type: general      Patient location during evaluation: PACU  Patient participation: Yes- Able to Participate  Level of consciousness: awake and sedated  Post-procedure vital signs: reviewed and stable  Pain management: adequate  Airway patency: patent    PONV status at discharge: No PONV  Anesthetic complications: no      Cardiovascular status: blood pressure returned to baseline  Respiratory status: unassisted  Hydration status: euvolemic  Follow-up not needed.          Vitals Value Taken Time   BP 84/39 05/20/22 1400   Temp 37.3 °C (99.2 °F) 05/20/22 1130   Pulse 95 05/20/22 1400   Resp 28 05/20/22 1400   SpO2 99 % 05/20/22 1400         Event Time   Out of Recovery 10:30:00         Pain/Rio Score: Rio Score: 5 (5/20/2022 10:30 AM)

## 2022-05-20 NOTE — PLAN OF CARE
Problem: Bleeding (Sepsis/Septic Shock)  Goal: Absence of Bleeding  Outcome: Ongoing, Progressing     Problem: Glycemic Control Impaired (Sepsis/Septic Shock)  Goal: Blood Glucose Level Within Desired Range  Outcome: Ongoing, Progressing     Problem: Infection Progression (Sepsis/Septic Shock)  Goal: Absence of Infection Signs and Symptoms  Outcome: Ongoing, Progressing     Problem: Fluid and Electrolyte Imbalance (Acute Kidney Injury/Impairment)  Goal: Fluid and Electrolyte Balance  Outcome: Ongoing, Progressing

## 2022-05-20 NOTE — OP NOTE
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  Patient had a large necrotic ulcer with exposed metatarsal of the right lateral aspect.  We cut this ulcer and amputated the 5th toe which had involvement as well.  MTP joint was essentially necrotic and we bit back the metatarsal head to good clean healthy bleeding tissues.  We amputated the base and left it open due to the infection.  We irrigated the wound out and placed a wet to dry.  Patient tolerated the procedure well.  On the medial aspect he had about 2.5 x 1 cm open wound with some underlying exposed tendons.  We debrided these tendons to good healing tissues irrigated out also due to wet to dry the area.  Patient tolerated the procedure well.    Complications: No    Estimated Blood Loss (EBL): * No values recorded between 5/20/2022  9:36 AM and 5/20/2022  9:57 AM *           Implants: * No implants in log *    Specimens:   Specimen (24h ago, onward)            Orders from this encounter     Start     Ordered    05/20/22 0942  Surgical Pathology  RELEASE UPON ORDERING         05/20/22 0942          Orders from suspended admission ([facility] Abernathy DO)     Start     Ordered    05/19/22 1548  [Order Hold - Suspended Admission]  Surgical Pathology  Once   (On hold at [facility] LUD[facility]  since 05/20/22 1250)   Question:  Source(s):  Answer:  Toe, Right Foot    05/19/22 4675                        Condition: Good    Disposition: PACU - hemodynamically stable.    Attestation: I was present and scrubbed for the entire procedure.

## 2022-05-20 NOTE — TRANSFER OF CARE
Anesthesia Transfer of Care Note    Patient: Og Wolf Garcia    Procedure(s) Performed: Procedure(s) (LRB):  AMPUTATION, TOE (Right)    Patient location: Other: specialty    Anesthesia Type: general    Transport from OR: Transported from OR intubated on 100% O2 by AMBU with assisted ventilation    Post pain: adequate analgesia    Post assessment: no apparent anesthetic complications    Post vital signs: stable    Level of consciousness: sedated and responds to stimulation    Nausea/Vomiting: no nausea/vomiting    Complications: none    Transfer of care protocol was followedComments: Good SV continue, NAD noted, VSS, RTRN      Last vitals:   Visit Vitals  BP (!) 93/52   Pulse 103   Temp 36.5 °C (97.7 °F)   Resp 12   SpO2 100%

## 2022-05-20 NOTE — PLAN OF CARE
Problem: Breathing Pattern Ineffective  Goal: Effective Breathing Pattern  Outcome: Ongoing, Progressing

## 2022-05-20 NOTE — PLAN OF CARE
[facility] called and they are starting the process of getting pt's medicaid swapped over to [place] and prescreening pt. SS will follow up on pt next week.

## 2022-05-20 NOTE — SUBJECTIVE & OBJECTIVE
Interval History:  Patient seems alert    Objective:     Vital Signs (Most Recent):  Temp: 99.8 °F (37.7 °C) (05/20/22 0300)  Pulse: 106 (05/20/22 0600)  Resp: 11 (05/20/22 0600)  BP: (!) 99/48 (05/20/22 0600)  SpO2: 100 % (05/20/22 0600)   Vital Signs (24h Range):  Temp:  [97.3 °F (36.3 °C)-100.8 °F (38.2 °C)] 99.8 °F (37.7 °C)  Pulse:  [] 106  Resp:  [8-40] 11  SpO2:  [95 %-100 %] 100 %  BP: ()/(43-63) 99/48     Weight: 78.3 kg (172 lb 9.9 oz)  Body mass index is 24.08 kg/m².      Intake/Output Summary (Last 24 hours) at 5/20/2022 0700  Last data filed at 5/20/2022 0600  Gross per 24 hour   Intake 840 ml   Output --   Net 840 ml       Physical Exam  Vitals reviewed.   Constitutional:       Appearance: Normal appearance.      Interventions: He is not intubated.     Comments: Tracheostomy   HENT:      Head: Normocephalic and atraumatic.      Nose: Nose normal.      Mouth/Throat:      Mouth: Mucous membranes are dry.      Pharynx: Oropharynx is clear.   Eyes:      Extraocular Movements: Extraocular movements intact.      Conjunctiva/sclera: Conjunctivae normal.      Pupils: Pupils are equal, round, and reactive to light.   Cardiovascular:      Rate and Rhythm: Normal rate.      Heart sounds: Normal heart sounds. No murmur heard.  Pulmonary:      Effort: Pulmonary effort is normal. He is not intubated.      Breath sounds: Normal breath sounds.   Abdominal:      General: Abdomen is flat. Bowel sounds are normal.      Palpations: Abdomen is soft.   Musculoskeletal:         General: Normal range of motion.      Cervical back: Normal range of motion and neck supple.      Right lower leg: No edema.      Left lower leg: No edema.   Skin:     General: Skin is warm and dry.      Capillary Refill: Capillary refill takes less than 2 seconds.   Neurological:      General: No focal deficit present.      Mental Status: He is alert and oriented to person, place, and time.   Psychiatric:         Mood and Affect: Mood  normal.         Behavior: Behavior normal.       Vents:  Vent Mode: CPAP / PSV (05/20/22 0036)  Ventilator Initiated: No (04/23/22 1934)  Set Rate: 0 BPM (05/18/22 0516)  Vt Set: 0 mL (05/18/22 0516)  Pressure Support: 15 cmH20 (05/20/22 0036)  PEEP/CPAP: 5 cmH20 (05/20/22 0036)  Oxygen Concentration (%): 30 (05/20/22 0036)  Peak Airway Pressure: 19 cmH2O (05/20/22 0036)  Plateau Pressure: 20 cmH20 (03/07/22 0500)  Total Ve: 4.3 mL (05/20/22 0036)  F/VT Ratio<105 (RSBI): (!) 28.9 (05/20/22 0036)    Lines/Drains/Airways       Central Venous Catheter Line  Duration                  Hemodialysis Catheter 12/30/21 0900 right internal jugular 140 days              Drain  Duration                  Colostomy 12/18/21 1030 Descending/sigmoid  days         Gastrostomy/Enterostomy 03/09/22 1436 Percutaneous endoscopic gastrostomy (PEG) midline feeding 71 days              Airway  Duration                  Surgical Airway 04/27/22 0856 Shiley;Other (Comment) Cuffed;Long 22 days              Peripheral Intravenous Line  Duration                  Peripheral IV - Single Lumen 04/11/22 1623 18 G Anterior;Proximal;Right Forearm 38 days                    Significant Labs:    CBC/Anemia Profile:  No results for input(s): WBC, HGB, HCT, PLT, MCV, RDW, IRON, FERRITIN, RETIC, FOLATE, [id number], OCCULTBLOOD in the last 48 hours.     Chemistries:  Recent Labs   Lab 05/18/22  1216      K 3.6      CO2 27   BUN 17   CREATININE 0.95   CALCIUM 8.7   ALBUMIN 1.4*   PROT 7.6   BILITOT 0.2   ALKPHOS 490*   ALT 55   AST 34       Recent Lab Results         05/20/22  0458   05/19/22  2049   05/19/22  1751   05/19/22  1202        POC Glucose 71   163   136   136               Significant Imaging:  I have reviewed all pertinent imaging results/findings within the past 24 hours.

## 2022-05-20 NOTE — PROGRESS NOTES
[facility]  Pulmonology  Progress Note    Patient Name: Og Garcia  MRN: [id number]  Admission Date: 12/23/2021  Hospital Length of Stay: 148 days  Code Status: Prior  Attending Provider: Cecil Abernathy DO  Primary Care Provider: Hector Arndt DNP, FNP-C   Principal Problem: Denice gangrene    Subjective:     Interval History:  Patient seems alert    Objective:     Vital Signs (Most Recent):  Temp: 99.8 °F (37.7 °C) (05/20/22 0300)  Pulse: 106 (05/20/22 0600)  Resp: 11 (05/20/22 0600)  BP: (!) 99/48 (05/20/22 0600)  SpO2: 100 % (05/20/22 0600)   Vital Signs (24h Range):  Temp:  [97.3 °F (36.3 °C)-100.8 °F (38.2 °C)] 99.8 °F (37.7 °C)  Pulse:  [] 106  Resp:  [8-40] 11  SpO2:  [95 %-100 %] 100 %  BP: ()/(43-63) 99/48     Weight: 78.3 kg (172 lb 9.9 oz)  Body mass index is 24.08 kg/m².      Intake/Output Summary (Last 24 hours) at 5/20/2022 0700  Last data filed at 5/20/2022 0600  Gross per 24 hour   Intake 840 ml   Output --   Net 840 ml       Physical Exam  Vitals reviewed.   Constitutional:       Appearance: Normal appearance.      Interventions: He is not intubated.     Comments: Tracheostomy   HENT:      Head: Normocephalic and atraumatic.      Nose: Nose normal.      Mouth/Throat:      Mouth: Mucous membranes are dry.      Pharynx: Oropharynx is clear.   Eyes:      Extraocular Movements: Extraocular movements intact.      Conjunctiva/sclera: Conjunctivae normal.      Pupils: Pupils are equal, round, and reactive to light.   Cardiovascular:      Rate and Rhythm: Normal rate.      Heart sounds: Normal heart sounds. No murmur heard.  Pulmonary:      Effort: Pulmonary effort is normal. He is not intubated.      Breath sounds: Normal breath sounds.   Abdominal:      General: Abdomen is flat. Bowel sounds are normal.      Palpations: Abdomen is soft.   Musculoskeletal:         General: Normal range of motion.      Cervical back: Normal range of motion and neck supple.       Right lower leg: No edema.      Left lower leg: No edema.   Skin:     General: Skin is warm and dry.      Capillary Refill: Capillary refill takes less than 2 seconds.   Neurological:      General: No focal deficit present.      Mental Status: He is alert and oriented to person, place, and time.   Psychiatric:         Mood and Affect: Mood normal.         Behavior: Behavior normal.       Vents:  Vent Mode: CPAP / PSV (05/20/22 0036)  Ventilator Initiated: No (04/23/22 1934)  Set Rate: 0 BPM (05/18/22 0516)  Vt Set: 0 mL (05/18/22 0516)  Pressure Support: 15 cmH20 (05/20/22 0036)  PEEP/CPAP: 5 cmH20 (05/20/22 0036)  Oxygen Concentration (%): 30 (05/20/22 0036)  Peak Airway Pressure: 19 cmH2O (05/20/22 0036)  Plateau Pressure: 20 cmH20 (03/07/22 0500)  Total Ve: 4.3 mL (05/20/22 0036)  F/VT Ratio<105 (RSBI): (!) 28.9 (05/20/22 0036)    Lines/Drains/Airways       Central Venous Catheter Line  Duration                  Hemodialysis Catheter 12/30/21 0900 right internal jugular 140 days              Drain  Duration                  Colostomy 12/18/21 1030 Descending/sigmoid  days         Gastrostomy/Enterostomy 03/09/22 1436 Percutaneous endoscopic gastrostomy (PEG) midline feeding 71 days              Airway  Duration                  Surgical Airway 04/27/22 0856 Shiley;Other (Comment) Cuffed;Long 22 days              Peripheral Intravenous Line  Duration                  Peripheral IV - Single Lumen 04/11/22 1623 18 G Anterior;Proximal;Right Forearm 38 days                    Significant Labs:    CBC/Anemia Profile:  No results for input(s): WBC, HGB, HCT, PLT, MCV, RDW, IRON, FERRITIN, RETIC, FOLATE, [id number], OCCULTBLOOD in the last 48 hours.     Chemistries:  Recent Labs   Lab 05/18/22  1216      K 3.6      CO2 27   BUN 17   CREATININE 0.95   CALCIUM 8.7   ALBUMIN 1.4*   PROT 7.6   BILITOT 0.2   ALKPHOS 490*   ALT 55   AST 34       Recent Lab Results         05/20/22  0458   05/19/22 2049    05/19/22  1751   05/19/22  1202        POC Glucose 71   163   136   136               Significant Imaging:  I have reviewed all pertinent imaging results/findings within the past 24 hours.    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 We are going to give another unit of PRBC's today with dialysis  4/16 post transfusion cbc is pending  4/18- hgb is 6.6. Will plan for transfusion with 1 unit of prbc's with next HD  4/21 - 7.2/21.6   4/25- 6.6/19.7 - will transfuse one unit PRBCs with next HD   4/28- Hgb now 8.0 following transfusion  5/2/2022 hemoglobin 7 no need for transfusion yet  5/3/2022 transfuse 1 unit packed red blood cells  05/05/2022 I believe this anemia is probably related to his chronic renal sufficiency a wonder if he is a candidate for Epogen at this point  5/10- transfused 1 unit yesterday  5/12 repeat H&H in am  05/13/2022 in 2 H&H pending  Most recent 7.7/22.9                 Jose Urban MD  Pulmonology  [facility]

## 2022-05-20 NOTE — H&P
Agree with Meghan CROFT Note from yesterday.  Lateral aspect of right foot with exposed bone protruding from skin, necrosis of the skin and right 5th toe.  OR for amputation.  Risks and benefits explained.  All questions answered

## 2022-05-20 NOTE — PROGRESS NOTES
Progress Note                                                           Infectious disease   Admit Date: 12/23/2021    SUBJECTIVE:     Follow-up For:  Denice gangrene    HPI/Interval history:  Patient went for debridement of left medial foot wound today along with amputation of right 5th toe.  Wound sample sent for culture and sensitivity.  T-max 99.9°.  He is back on the vent postoperatively.      Review of Systems:    unable to obtain as patient nonverbal      OBJECTIVE:     Vital Signs Range (Last 24H):  Temp:  [97.3 °F (36.3 °C)-99.9 °F (37.7 °C)]   Pulse:  []   Resp:  [8-35]   BP: ()/(42-63)   SpO2:  [92 %-100 %]     Physical Exam:  Constitutional:  No acute distress, makes eye contact but nonverbal  HENT: supple   Head: normocephalic, atraumatic   Eyes: Conjunctivae and pupil normal of the left, enucleated right eye ball. nd accomodation, no drainage   Cardiovascular: regular rate and rhythm, S1, S2 normal, no murmur, click, rub or gallop  Pulmonary:  On the vent, no crepitations or wheezing appreciated  Gastrointestinal:  Colostomy and PEG present, bowel sounds normal; non-tender, no masses or organomegaly appreciated  Muscular/Skeletal: Lower extremities without cyanosis or edema, no clubbing.  Multiple wounds to feet bandaged  Skin: Skin color, texture normal. No rashes, ulcers or new lesions. Skin warm and dry.     Laboratory:  CBC:   Recent Labs   Lab 05/20/22  0510   WBC 18.57*   RBC 2.27*   HGB 7.2*   HCT 22.3*   *   MCV 98.2*   MCH 31.7*   MCHC 32.3     BMP:   Recent Labs   Lab 05/20/22  0510   GLU 57*      K 4.1      CO2 25   BUN 75*   CREATININE 2.40*   CALCIUM 9.0     CMP:   Recent Labs   Lab 05/18/22  1216 05/20/22  0510   * 57*   CALCIUM 8.7 9.0   ALBUMIN 1.4*  --    PROT 7.6  --     137   K 3.6 4.1   CO2 27 25    100   BUN 17 75*   CREATININE 0.95 2.40*   ALKPHOS 490*  --    ALT 55  --    AST 34  --    BILITOT 0.2  --      Microbiology Results  (last 7 days)     Procedure Component Value Units Date/Time    Culture, Bone [[id number]] Collected: 05/19/22 1630    Order Status: Resulted Specimen: Bone from Toe Updated: 05/20/22 0906    Blood culture [[id number]] Collected: 05/19/22 2059    Order Status: Sent Specimen: Blood Updated: 05/19/22 2111    Blood culture [[id number]] Collected: 05/19/22 2059    Order Status: Sent Specimen: Blood Updated: 05/19/22 2111    Blood culture [[id number]]  (Abnormal)  (Susceptibility) Collected: 05/08/22 0628    Order Status: Completed Specimen: Blood Updated: 05/16/22 0542     Culture, Blood Coagulase-negative Staphylococcus species     Gram Stain Result Gram positive cocci     Comment: From Anaerobe bottle  This is an appended report. These results have been appended to a previously preliminary verified report.       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 Osteomyelitis to right 5th digit  2. Osteomyelitis to the distal right fibula  3. Recent low-grade fevers with worsening leukocytosis today, blood and tissue cultures in progress  4. Multiple decubitus ulcers including large wound to sacrum infected recently with multidrug resistant organisms including a carbapenem resistant Gram-negative  5. Prolonged hospital stay  6. End-stage renal disease on hemodialysis    PLAN:  1. Will empirically start broad-spectrum antibiotics including vancomycin, tobramycin, and meropenem, all renally dosed  2. Follow up culture results and adjust antibiotic therapy accordingly   3. Continue aggressive local wound care but will overall prognosis is poor

## 2022-05-20 NOTE — PROGRESS NOTES
Pharmacokinetic Initial Assessment: Tobramycin    Assessment:  Weight utilized for dose calculation: Actual Body Weight  Dosing method utilized: Dosing by random level    Plan: 235 mg (3 mg/kg) as a one time dose after HD this evening. Random level to be ordered prior to 2nd HD session on Wednesday morning, 5/25.    Pharmacy will continue to monitor.    Please contact pharmacy at extension [phone] with any questions regarding this assessment.    Thank you for the consult,    Cisco [place]       Patient brief summary:  Og Garcia is a 67 y.o. male initiated on aminoglycoside therapy for treatment of suspected bone/joint infection    Drug Allergies:   Review of patient's allergies indicates:  No Known Allergies    Actual Body Weight:   78.3 kg    Adjust Body Weight:   76.5 kg    Ideal Body Weight:  75.3 kg    Renal Function:   Estimated Creatinine Clearance: 31.8 mL/min (A) (based on SCr of 2.4 mg/dL (H)).,     Dialysis Method (if applicable):  intermittent HD    CBC (last 72 hours):  Recent Labs   Lab Result Units 05/20/22  0510   WBC K/uL 18.57*   Hemoglobin g/dL 7.2*   Hematocrit % 22.3*   Platelet Count K/uL 569*   Lymphocytes % % 31.0   Lymphocytes, Man % % 28   Monocytes % % 9.8*   Monocytes, Man % % 7*   Eosinophils % % 3.6   Eosinophils, Man % % 1   Basophils % % 0.4       Metabolic Panel (last 72 hours):  Recent Labs   Lab Result Units 05/18/22  1216 05/20/22  0510   Sodium mmol/L 138 137   Potassium mmol/L 3.6 4.1   Chloride mmol/L 100 100   CO2 mmol/L 27 25   Glucose mg/dL 140* 57*   BUN mg/dL 17 75*   Creatinine mg/dL 0.95 2.40*   Albumin g/dL 1.4*  --    Bilirubin, Total mg/dL 0.2  --    Alk Phos U/L 490*  --    AST U/L 34  --    ALT U/L 55  --        Microbiologic Results:  Microbiology Results (last 7 days)       Procedure Component Value Units Date/Time    Culture, Bone [[id number]] Collected: 05/19/22 1630    Order Status: Resulted Specimen: Bone from Toe Updated: 05/20/22 0906    Blood culture  [[id number]] Collected: 05/19/22 2059    Order Status: Sent Specimen: Blood Updated: 05/19/22 2111    Blood culture [[id number]] Collected: 05/19/22 2059    Order Status: Sent Specimen: Blood Updated: 05/19/22 2111    Blood culture [[id number]]  (Abnormal)  (Susceptibility) Collected: 05/08/22 0628    Order Status: Completed Specimen: Blood Updated: 05/16/22 0542     Culture, Blood Coagulase-negative Staphylococcus species     Gram Stain Result Gram positive cocci     Comment: From Anaerobe bottle  This is an appended report. These results have been appended to a previously preliminary verified report.       Blood culture [[id number]] Collected: 05/08/22 0628    Order Status: Completed Specimen: Blood Updated: 05/14/22 0535     Culture, Blood No Growth at 5 days

## 2022-05-20 NOTE — DIALYSIS ROUNDING
"          Nephrology Department            NAME: Og Garcia   YOB: 1955  MRN: [id number]  NOTE DATE: 05/20/2022       Seen on dialysis.  He is tolerating the procedure.  No other acute changes.    Patient complains:  None.      REVIEW OF SYSTEMS:  he reports no shortness of breath, nausea or vomiting. No acute changes.    VITALS:  height is 5' 11" (1.803 m) and weight is 78.3 kg (172 lb 9.9 oz). His oral temperature is 99.2 °F (37.3 °C). His blood pressure is 89/47 (abnormal) and his pulse is 99. His respiration is 33 (abnormal) and oxygen saturation is 99%.  Hemodialysis documentation flowsheet reviewed with dialysis nurse, including weight and vitals.    Resting comfortably, NAD.  Respiration unlabored. Lungs clear.  Heart regular, no rub.  Abdomen soft, nontender, positive bowl sounds  No edema.    Recent Labs   Lab 05/16/22  0632 05/18/22  1216 05/20/22  0510   * 138 137   K 3.8 3.6 4.1   CL 94* 100 100   CO2 24 27 25   BUN 88* 17 75*   CREATININE 2.47* 0.95 2.40*   CALCIUM 8.8 8.7 9.0     Recent Labs   Lab 05/16/22  0632 05/20/22  0510   WBC 15.19* 18.57*   HGB 7.7* 7.2*   HCT 22.9* 22.3*   * 569*       ASSESSMENT/PLAN:  Continue with scheduled hemodialysis for this patient.    Edwin Pryor Jr, MD  "

## 2022-05-20 NOTE — PROGRESS NOTES
REC'ED TO [facility] FROM OR VIA BED. RESP. THERAPIST CONNECTED HIM TO VENT VIA #8 SHILEY TRACHE C-PAP PS 15, PEEP 5, FIO2 30%. O2 SATS 100%. AROUSES WITH VOICE AND TACTILE STIMULI, OPENS EYES. DIALYSIS CATH RIGHT UPPER CHEST. PEG TUBE IN PLACE. COLOSTOMY LEFT ABDOMEN. DRESSING RIGHT FOOT D/I. SCD HOSE AND WAFFLE BOOTS IN PLACE. IV RIGHT FOREARM, POSITIONAL. OBSERVING, CLOSELY. SEE FLOW SHEET. BLOOD SUGAR 109.

## 2022-05-20 NOTE — PLAN OF CARE
Problem: Fluid and Electrolyte Imbalance (Acute Kidney Injury/Impairment)  Goal: Fluid and Electrolyte Balance  Outcome: Ongoing, Progressing     Problem: Oral Intake Inadequate (Acute Kidney Injury/Impairment)  Goal: Optimal Nutrition Intake  Outcome: Ongoing, Progressing     Problem: Renal Function Impairment (Acute Kidney Injury/Impairment)  Goal: Effective Renal Function  Outcome: Ongoing, Progressing      Noemi Cheung, [facility]

## 2022-05-20 NOTE — PROGRESS NOTES
Pharmacokinetic Initial Assessment: IV Vancomycin    Assessment/Plan:    Initiate intravenous vancomycin with loading dose of 1500 mg once with subsequent doses when random concentrations are less than 20 mcg/mL  Desired empiric serum trough concentration is 15 to 20 mcg/mL  Draw vancomycin random level on 5/25 at 0430.  Pharmacy will continue to follow and monitor vancomycin.      Please contact pharmacy at extension [phone] with any questions regarding this assessment.     Thank you for the consult,   [employer] [place]       Patient brief summary:  Og Garcia is a 67 y.o. male initiated on antimicrobial therapy with IV Vancomycin for treatment of suspected bone/joint infection    Drug Allergies:   Review of patient's allergies indicates:  No Known Allergies    Actual Body Weight:   78.3 kg    Renal Function:   Estimated Creatinine Clearance: 31.8 mL/min (A) (based on SCr of 2.4 mg/dL (H)).,     Dialysis Method (if applicable):  intermittent HD    CBC (last 72 hours):  Recent Labs   Lab Result Units 05/20/22  0510   WBC K/uL 18.57*   Hemoglobin g/dL 7.2*   Hematocrit % 22.3*   Platelet Count K/uL 569*   Lymphocytes % % 31.0   Lymphocytes, Man % % 28   Monocytes % % 9.8*   Monocytes, Man % % 7*   Eosinophils % % 3.6   Eosinophils, Man % % 1   Basophils % % 0.4       Metabolic Panel (last 72 hours):  Recent Labs   Lab Result Units 05/18/22  1216 05/20/22  0510   Sodium mmol/L 138 137   Potassium mmol/L 3.6 4.1   Chloride mmol/L 100 100   CO2 mmol/L 27 25   Glucose mg/dL 140* 57*   BUN mg/dL 17 75*   Creatinine mg/dL 0.95 2.40*   Albumin g/dL 1.4*  --    Bilirubin, Total mg/dL 0.2  --    Alk Phos U/L 490*  --    AST U/L 34  --    ALT U/L 55  --        Drug levels (last 3 results):  No results for input(s): VANCOMYCINRA, VANCOMYCINPE, VANCOMYCINTR in the last 72 hours.    Microbiologic Results:  Microbiology Results (last 7 days)       Procedure Component Value Units Date/Time    Culture, Bone [[id number]]  Collected: 05/19/22 1630    Order Status: Resulted Specimen: Bone from Toe Updated: 05/20/22 0906    Blood culture [[id number]] Collected: 05/19/22 2059    Order Status: Sent Specimen: Blood Updated: 05/19/22 2111    Blood culture [[id number]] Collected: 05/19/22 2059    Order Status: Sent Specimen: Blood Updated: 05/19/22 2111    Blood culture [[id number]]  (Abnormal)  (Susceptibility) Collected: 05/08/22 0628    Order Status: Completed Specimen: Blood Updated: 05/16/22 0542     Culture, Blood Coagulase-negative Staphylococcus species     Gram Stain Result Gram positive cocci     Comment: From Anaerobe bottle  This is an appended report. These results have been appended to a previously preliminary verified report.       Blood culture [[id number]] Collected: 05/08/22 0628    Order Status: Completed Specimen: Blood Updated: 05/14/22 0535     Culture, Blood No Growth at 5 days

## 2022-05-21 NOTE — PROGRESS NOTES
Progress Note                                                           Infectious disease   Admit Date: 12/23/2021    SUBJECTIVE:     Follow-up For:  Denice gangrene    HPI/Interval history:  Patient doing fair , similar to yesterday.  T-max 100.2°.     Review of Systems:    unable to obtain as patient nonverbal      OBJECTIVE:     Vital Signs Range (Last 24H):  Temp:  [99.2 °F (37.3 °C)-100.2 °F (37.9 °C)]   Pulse:  []   Resp:  [8-40]   BP: ()/(39-67)   SpO2:  [94 %-100 %]     Physical Exam:  Constitutional:  No acute distress, makes eye contact but nonverbal, appeared to be watching TV  HENT: supple   Head: normocephalic, atraumatic   Eyes: Conjunctivae and pupil normal of the left, enucleated right eye ball, no drainage   Cardiovascular: regular rate and rhythm, S1, S2 normal, no murmur, click, rub or gallop  Pulmonary:  On the vent, no crepitations or wheezing appreciated  Gastrointestinal:  Colostomy and PEG present, bowel sounds normal; non-tender, no masses or organomegaly appreciated  Muscular/Skeletal: Lower extremities without cyanosis or edema, no clubbing.  Multiple wounds to feet bandaged  Skin: Skin color, texture normal. No rashes, ulcers or new lesions. Skin warm and dry.     Laboratory:  CBC:   Recent Labs   Lab 05/20/22  0510   WBC 18.57*   RBC 2.27*   HGB 7.2*   HCT 22.3*   *   MCV 98.2*   MCH 31.7*   MCHC 32.3     BMP:   Recent Labs   Lab 05/20/22  0510   GLU 57*      K 4.1      CO2 25   BUN 75*   CREATININE 2.40*   CALCIUM 9.0     CMP:   Recent Labs   Lab 05/18/22  1216 05/20/22  0510   * 57*   CALCIUM 8.7 9.0   ALBUMIN 1.4*  --    PROT 7.6  --     137   K 3.6 4.1   CO2 27 25    100   BUN 17 75*   CREATININE 0.95 2.40*   ALKPHOS 490*  --    ALT 55  --    AST 34  --    BILITOT 0.2  --      Microbiology Results (last 7 days)     Procedure Component Value Units Date/Time    Culture, Bone [[id number]] Collected: 05/19/22 1630    Order Status:  Completed Specimen: Bone from Toe Updated: 05/21/22 1058     Culture, Bone No Growth To Date    Blood culture [[id number]] Collected: 05/19/22 2059    Order Status: Completed Specimen: Blood Updated: 05/21/22 0815     Culture, Blood No Growth At 24 Hours    Blood culture [[id number]] Collected: 05/19/22 2059    Order Status: Completed Specimen: Blood Updated: 05/21/22 0815     Culture, Blood No Growth At 24 Hours    Blood culture [[id number]]  (Abnormal)  (Susceptibility) Collected: 05/08/22 0628    Order Status: Completed Specimen: Blood Updated: 05/16/22 0542     Culture, Blood Coagulase-negative Staphylococcus species     Gram Stain Result Gram positive cocci     Comment: From Anaerobe bottle  This is an appended report. These results have been appended to a previously preliminary verified report.             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 Osteomyelitis to right 5th digit  2. Osteomyelitis to the distal right fibula  3. Recent low-grade fevers with worsening leukocytosis, blood and tissue cultures in progress.  Cause likely infected wounds.  4. Multiple decubitus ulcers including large wound to sacrum infected recently with multidrug resistant organisms including a carbapenem resistant Gram-negative  5. Prolonged hospital stay  6. End-stage renal disease on hemodialysis    PLAN:  1. Continue empiric vancomycin, tobramycin, and meropenem, all renally dosed  2. Follow up culture results and adjust antibiotic therapy accordingly   3. Continue aggressive local wound care

## 2022-05-21 NOTE — PROGRESS NOTES
[facility]  Pulmonology  Progress Note    Patient Name: Og Garcia  MRN: [id number]  Admission Date: 12/23/2021  Hospital Length of Stay: 149 days  Code Status: Prior  Attending Provider: Cecil Abernathy DO  Primary Care Provider: Hector Arndt DNP, FNP-C   Principal Problem: Denice gangrene    Subjective:     Interval History:  Awake    Objective:     Vital Signs (Most Recent):  Temp: 99.8 °F (37.7 °C) (05/21/22 0400)  Pulse: 109 (05/21/22 0700)  Resp: 15 (05/21/22 0700)  BP: 119/67 (05/21/22 0700)  SpO2: 100 % (05/21/22 0700) Vital Signs (24h Range):  Temp:  [97.7 °F (36.5 °C)-100.2 °F (37.9 °C)] 99.8 °F (37.7 °C)  Pulse:  [] 109  Resp:  [8-35] 15  SpO2:  [92 %-100 %] 100 %  BP: ()/(39-67) 119/67     Weight: 77 kg (169 lb 12.1 oz)  Body mass index is 23.68 kg/m².      Intake/Output Summary (Last 24 hours) at 5/21/2022 0704  Last data filed at 5/20/2022 1800  Gross per 24 hour   Intake 560 ml   Output 1830 ml   Net -1270 ml       Physical Exam  Vitals reviewed.   Constitutional:       Appearance: Normal appearance.      Interventions: He is not intubated.     Comments: Tracheostomy   HENT:      Head: Normocephalic and atraumatic.      Nose: Nose normal.      Mouth/Throat:      Mouth: Mucous membranes are dry.      Pharynx: Oropharynx is clear.   Eyes:      Extraocular Movements: Extraocular movements intact.      Conjunctiva/sclera: Conjunctivae normal.      Pupils: Pupils are equal, round, and reactive to light.   Cardiovascular:      Rate and Rhythm: Normal rate.      Heart sounds: Normal heart sounds. No murmur heard.  Pulmonary:      Effort: Pulmonary effort is normal. He is not intubated.      Breath sounds: Normal breath sounds.   Abdominal:      General: Abdomen is flat. Bowel sounds are normal.      Palpations: Abdomen is soft.   Musculoskeletal:         General: Normal range of motion.      Cervical back: Normal range of motion and neck supple.      Right  lower leg: No edema.      Left lower leg: No edema.   Skin:     General: Skin is warm and dry.      Capillary Refill: Capillary refill takes less than 2 seconds.   Neurological:      General: No focal deficit present.      Mental Status: He is alert and oriented to person, place, and time.   Psychiatric:         Mood and Affect: Mood normal.         Behavior: Behavior normal.       Vents:  Vent Mode: CPAP / PSV (05/21/22 0426)  Ventilator Initiated: No (04/23/22 1934)  Set Rate: 0 BPM (05/18/22 0516)  Vt Set: 0 mL (05/18/22 0516)  Pressure Support: 15 cmH20 (05/21/22 0426)  PEEP/CPAP: 5 cmH20 (05/21/22 0426)  Oxygen Concentration (%): 30 (05/21/22 0426)  Peak Airway Pressure: 19 cmH2O (05/21/22 0426)  Plateau Pressure: 20 cmH20 (03/07/22 0500)  Total Ve: 5.8 mL (05/21/22 0426)  F/VT Ratio<105 (RSBI): (!) 12.92 (05/21/22 0426)    Lines/Drains/Airways       Central Venous Catheter Line  Duration                  Hemodialysis Catheter 12/30/21 0900 right internal jugular 141 days              Drain  Duration                  Colostomy 12/18/21 1030 Descending/sigmoid  days         Gastrostomy/Enterostomy 03/09/22 1436 Percutaneous endoscopic gastrostomy (PEG) midline feeding 72 days              Airway  Duration                  Surgical Airway 04/27/22 0856 Shiley;Other (Comment) Cuffed;Long 23 days              Peripheral Intravenous Line  Duration                  Peripheral IV - Single Lumen 04/11/22 1623 18 G Anterior;Proximal;Right Forearm 39 days                    Significant Labs:    CBC/Anemia Profile:  Recent Labs   Lab 05/20/22  0510   WBC 18.57*   HGB 7.2*   HCT 22.3*   *   MCV 98.2*   RDW 15.0*        Chemistries:  Recent Labs   Lab 05/20/22  0510      K 4.1      CO2 25   BUN 75*   CREATININE 2.40*   CALCIUM 9.0       Recent Lab Results  (Last 5 results in the past 24 hours)        05/21/22  0554   05/20/22  2327   05/20/22  2041   05/20/22  1715   05/20/22  1137        POC  Glucose 130   190   130   127   90                              Significant Imaging:  I have reviewed all pertinent imaging results/findings within the past 24 hours.    Assessment/Plan:     * Denice gangrene  Stable with no acute issues          Chronic respiratory failure  - previously on trach collar, regressed  - doing well with trials  - can progress to trach collar continuous if tolerate  - may need PSV for rest overnight  Continue trach collar today    Ischemic ulcer of right foot with necrosis of bone  Status post amputation    ESRD (end stage renal disease)  Started on HD 12/30   Continue with HD per nephrology      Type 2 diabetes mellitus without complication, without long-term current use of insulin  Blood sugar is well controlled    Acute blood loss anemia  No active bleeding  Hgb 6.0 on 4/11  Patient transfused 1 unit with dialysis yesterday  4/13 -H/H 7.5/22.7  4/15- hgb is 6.2 today. We are going to give another unit of PRBC's today with dialysis  4/16 post transfusion cbc is pending  4/18- hgb is 6.6. Will plan for transfusion with 1 unit of prbc's with next HD  4/21 - 7.2/21.6   4/25- 6.6/19.7 - will transfuse one unit PRBCs with next HD   4/28- Hgb now 8.0 following transfusion  5/2/2022 hemoglobin 7 no need for transfusion yet  5/3/2022 transfuse 1 unit packed red blood cells  05/05/2022 I believe this anemia is probably related to his chronic renal sufficiency a wonder if he is a candidate for Epogen at this point  5/10- transfused 1 unit yesterday  5/12 repeat H&H in am  05/13/2022 in 2 H&H pending  Most recent 7.7/22.9                 Jose Urban MD  Pulmonology  [facility]

## 2022-05-21 NOTE — SUBJECTIVE & OBJECTIVE
Interval History:  Awake    Objective:     Vital Signs (Most Recent):  Temp: 99.8 °F (37.7 °C) (05/21/22 0400)  Pulse: 109 (05/21/22 0700)  Resp: 15 (05/21/22 0700)  BP: 119/67 (05/21/22 0700)  SpO2: 100 % (05/21/22 0700) Vital Signs (24h Range):  Temp:  [97.7 °F (36.5 °C)-100.2 °F (37.9 °C)] 99.8 °F (37.7 °C)  Pulse:  [] 109  Resp:  [8-35] 15  SpO2:  [92 %-100 %] 100 %  BP: ()/(39-67) 119/67     Weight: 77 kg (169 lb 12.1 oz)  Body mass index is 23.68 kg/m².      Intake/Output Summary (Last 24 hours) at 5/21/2022 0704  Last data filed at 5/20/2022 1800  Gross per 24 hour   Intake 560 ml   Output 1830 ml   Net -1270 ml       Physical Exam  Vitals reviewed.   Constitutional:       Appearance: Normal appearance.      Interventions: He is not intubated.     Comments: Tracheostomy   HENT:      Head: Normocephalic and atraumatic.      Nose: Nose normal.      Mouth/Throat:      Mouth: Mucous membranes are dry.      Pharynx: Oropharynx is clear.   Eyes:      Extraocular Movements: Extraocular movements intact.      Conjunctiva/sclera: Conjunctivae normal.      Pupils: Pupils are equal, round, and reactive to light.   Cardiovascular:      Rate and Rhythm: Normal rate.      Heart sounds: Normal heart sounds. No murmur heard.  Pulmonary:      Effort: Pulmonary effort is normal. He is not intubated.      Breath sounds: Normal breath sounds.   Abdominal:      General: Abdomen is flat. Bowel sounds are normal.      Palpations: Abdomen is soft.   Musculoskeletal:         General: Normal range of motion.      Cervical back: Normal range of motion and neck supple.      Right lower leg: No edema.      Left lower leg: No edema.   Skin:     General: Skin is warm and dry.      Capillary Refill: Capillary refill takes less than 2 seconds.   Neurological:      General: No focal deficit present.      Mental Status: He is alert and oriented to person, place, and time.   Psychiatric:         Mood and Affect: Mood normal.          Behavior: Behavior normal.       Vents:  Vent Mode: CPAP / PSV (05/21/22 0426)  Ventilator Initiated: No (04/23/22 1934)  Set Rate: 0 BPM (05/18/22 0516)  Vt Set: 0 mL (05/18/22 0516)  Pressure Support: 15 cmH20 (05/21/22 0426)  PEEP/CPAP: 5 cmH20 (05/21/22 0426)  Oxygen Concentration (%): 30 (05/21/22 0426)  Peak Airway Pressure: 19 cmH2O (05/21/22 0426)  Plateau Pressure: 20 cmH20 (03/07/22 0500)  Total Ve: 5.8 mL (05/21/22 0426)  F/VT Ratio<105 (RSBI): (!) 12.92 (05/21/22 0426)    Lines/Drains/Airways       Central Venous Catheter Line  Duration                  Hemodialysis Catheter 12/30/21 0900 right internal jugular 141 days              Drain  Duration                  Colostomy 12/18/21 1030 Descending/sigmoid  days         Gastrostomy/Enterostomy 03/09/22 1436 Percutaneous endoscopic gastrostomy (PEG) midline feeding 72 days              Airway  Duration                  Surgical Airway 04/27/22 0856 Shiley;Other (Comment) Cuffed;Long 23 days              Peripheral Intravenous Line  Duration                  Peripheral IV - Single Lumen 04/11/22 1623 18 G Anterior;Proximal;Right Forearm 39 days                    Significant Labs:    CBC/Anemia Profile:  Recent Labs   Lab 05/20/22  0510   WBC 18.57*   HGB 7.2*   HCT 22.3*   *   MCV 98.2*   RDW 15.0*        Chemistries:  Recent Labs   Lab 05/20/22  0510      K 4.1      CO2 25   BUN 75*   CREATININE 2.40*   CALCIUM 9.0       Recent Lab Results  (Last 5 results in the past 24 hours)        05/21/22  0554   05/20/22  2327   05/20/22  2041   05/20/22  1715   05/20/22  1137        POC Glucose 130   190   130   127   90                              Significant Imaging:  I have reviewed all pertinent imaging results/findings within the past 24 hours.

## 2022-05-21 NOTE — ASSESSMENT & PLAN NOTE
- previously on trach collar, regressed  - doing well with trials  - can progress to trach collar continuous if tolerate  - may need PSV for rest overnight  Continue trach collar today

## 2022-05-22 NOTE — PROGRESS NOTES
Progress Note                                                           Infectious disease   Admit Date: 12/23/2021    SUBJECTIVE:     Follow-up For:  Denice gangrene    HPI/Interval history:  Patient doing fair , similar to yesterday.  T-max 99.8.     Review of Systems:    unable to obtain as patient nonverbal      OBJECTIVE:     Vital Signs Range (Last 24H):  Temp:  [98.3 °F (36.8 °C)-99.8 °F (37.7 °C)]   Pulse:  []   Resp:  [7-47]   BP: ()/(34-60)   SpO2:  [94 %-100 %]     Physical Exam:  Constitutional:  No acute distress, on trach collar, more drowsy today  HENT: supple   Head: normocephalic, atraumatic   Eyes: Conjunctivae and pupil normal of the left, enucleated right eye ball, no drainage   Cardiovascular: regular rate and rhythm, S1, S2 normal, no murmur, click, rub or gallop  Pulmonary:  On trach collar, no crepitations or wheezing appreciated  Gastrointestinal:  Colostomy and PEG present, bowel sounds normal; non-tender, no masses or organomegaly appreciated  Muscular/Skeletal: Lower extremities without cyanosis or edema, no clubbing.  Multiple wounds to feet bandaged  Skin: Skin color, texture normal. No rashes, ulcers or new lesions. Skin warm and dry.     Laboratory:  CBC:   Recent Labs   Lab 05/20/22  0510   WBC 18.57*   RBC 2.27*   HGB 7.2*   HCT 22.3*   *   MCV 98.2*   MCH 31.7*   MCHC 32.3     BMP:   Recent Labs   Lab 05/20/22  0510   GLU 57*      K 4.1      CO2 25   BUN 75*   CREATININE 2.40*   CALCIUM 9.0     CMP:   Recent Labs   Lab 05/18/22  1216 05/20/22  0510   * 57*   CALCIUM 8.7 9.0   ALBUMIN 1.4*  --    PROT 7.6  --     137   K 3.6 4.1   CO2 27 25    100   BUN 17 75*   CREATININE 0.95 2.40*   ALKPHOS 490*  --    ALT 55  --    AST 34  --    BILITOT 0.2  --      Microbiology Results (last 7 days)     Procedure Component Value Units Date/Time    Blood culture [[id number]] Collected: 05/19/22 2059    Order Status: Completed Specimen: Blood  Updated: 05/22/22 0905     Culture, Blood No Growth At 48 Hours    Blood culture [[id number]] Collected: 05/19/22 2059    Order Status: Completed Specimen: Blood Updated: 05/22/22 0905     Culture, Blood No Growth At 48 Hours    Culture, Bone [[id number]] Collected: 05/19/22 1630    Order Status: Completed Specimen: Bone from Toe Updated: 05/21/22 1058     Culture, Bone No Growth To Date    Blood culture [[id number]]  (Abnormal)  (Susceptibility) Collected: 05/08/22 0628    Order Status: Completed Specimen: Blood Updated: 05/16/22 0542     Culture, Blood Coagulase-negative Staphylococcus species     Gram Stain Result Gram positive cocci     Comment: From Anaerobe bottle  This is an appended report. These results have been appended to a previously preliminary verified report.             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 Osteomyelitis to right 5th digit  2. Osteomyelitis to the distal right fibula  3. Recent low-grade fevers with worsening leukocytosis, blood cultures negative but bone culture positive for carbapenem resistant Pseudomonas along with ESBL Klebsiella.    4. Multiple decubitus ulcers including large wound to sacrum infected recently with multidrug resistant organisms  5. Prolonged hospital stay  6. End-stage renal disease on hemodialysis    PLAN:  1. Discontinue vancomycin  2. Continue tobramycin and meropenem, all renally dosed  3. Follow up culture results and adjust antibiotic therapy accordingly   4. Continue aggressive local wound care   5. If we are hoping to cure his osteomyelitis, we will need to continue antibiotics until 7/1.  However overall prognosis is poor and family may want to consider comfort measures.

## 2022-05-22 NOTE — PROGRESS NOTES
[facility]  Nephrology  Progress Note    Patient Name: Og Garcia  MRN: [id number]  Admission Date: 12/23/2021  Hospital Length of Stay: 149 days  Attending Provider: Cecil Abernathy DO   Primary Care Physician: Hector Arndt DNP, FNP-C  Principal Problem:Denice gangrene    Consults  Subjective:     Interval History: The patient is nonverbal    Review of patient's allergies indicates:  No Known Allergies  Current Facility-Administered Medications   Medication Frequency    0.9%  NaCl infusion (for blood administration) Q24H PRN    0.9%  NaCl infusion PRN    acetaminophen tablet 500 mg Q6H PRN    albuterol nebulizer solution 2.5 mg Q4H PRN    albuterol-ipratropium 2.5 mg-0.5 mg/3 mL nebulizer solution 3 mL Q6H    bisacodyL EC tablet 10 mg Daily PRN    collagenase ointment Daily PRN    dextrose 50% injection 12.5 g PRN    diltiaZEM tablet 90 mg Q6H    glucagon (human recombinant) injection 1 mg PRN    guaiFENesin 100 mg/5 ml syrup 200 mg Q6H    heparin (porcine) injection 4,000 Units PRN    heparin (porcine) injection 5,000 Units Q8H    insulin aspart U-100 injection 1-10 Units Q6H    insulin detemir U-100 injection 25 Units QHS    meropenem (MERREM) 1 g in sodium chloride 0.9 % 100 mL IVPB (MB+) Q24H    metoprolol tartrate (LOPRESSOR) split tablet 12.5 mg BID    morphine injection 3 mg Q3H PRN    pantoprazole suspension 40 mg Daily    predniSONE tablet 2.5 mg Daily    sevelamer carbonate pwpk 0.8 g TID WM    simethicone chewable tablet 80 mg TID PRN    sodium chloride 0.9% bolus 250 mL PRN    tobramycin - pharmacy to dose pharmacy to manage frequency    vancomycin - pharmacy to dose pharmacy to manage frequency       Objective:     Vital Signs (Most Recent):  Temp: 98.9 °F (37.2 °C) (05/21/22 1500)  Pulse: 102 (05/21/22 1942)  Resp: 13 (05/21/22 1942)  BP: (!) 107/51 (05/21/22 1900)  SpO2: 100 % (05/21/22 1942)  O2 Device (Oxygen Therapy): Trach Collar  (05/21/22 1600) Vital Signs (24h Range):  Temp:  [98.9 °F (37.2 °C)-100.2 °F (37.9 °C)] 98.9 °F (37.2 °C)  Pulse:  [] 102  Resp:  [8-47] 13  SpO2:  [90 %-100 %] 100 %  BP: ()/(45-67) 107/51     Weight: 77 kg (169 lb 12.1 oz) (05/21/22 0628)  Body mass index is 23.68 kg/m².  Body surface area is 1.96 meters squared.    I/O last 3 completed shifts:  In: 560 [NG/GT:460; IV Piggyback:100]  Out: 2080 [Other:1500; Stool:580]    Physical Exam   Lungs-clear  Cor-no murmur or rub  Abd-soft,nontender    Significant Labs:sure  CBC:   Recent Labs   Lab 05/20/22  0510   WBC 18.57*   RBC 2.27*   HGB 7.2*   HCT 22.3*   *   MCV 98.2*   MCH 31.7*   MCHC 32.3     CMP:   Recent Labs   Lab 05/18/22  1216 05/20/22  0510   * 57*   CALCIUM 8.7 9.0   ALBUMIN 1.4*  --    PROT 7.6  --     137   K 3.6 4.1   CO2 27 25    100   BUN 17 75*   CREATININE 0.95 2.40*   ALKPHOS 490*  --    ALT 55  --    AST 34  --    BILITOT 0.2  --      All labs within the past 24 hours have been reviewed.    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] 12/13/2021 Yes    Osteomyelitis [M86.9] 05/19/2022 Clinically Undetermined    Ischemic ulcer of right foot with necrosis of bone [L97.514] 05/19/2022 No    Ulcer of right lower extremity with necrosis of bone [L97.914] 05/17/2022 No    Tachycardia [R00.0] 04/06/2022 No    Chronic respiratory failure [J96.10] 04/06/2022 Yes    Pressure ulcer of sacral region, stage 4 [L89.154]  Yes    Fever [R50.9] 02/26/2022 No    ESRD (end stage renal disease) [N18.6] 02/23/2022 Yes    Acute blood loss anemia [D62] 12/25/2021 No    Type 2 diabetes mellitus without complication, without long-term current use of insulin [E11.9] 12/25/2021 Yes    Necrotizing fasciitis [M72.6]  Yes    Hypertension [I10] 09/02/2021 Yes    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)  [I69.398, R26.9] 09/02/2021 Not Applicable      Problems Resolved During this Admission:    Diagnosis Date Noted Date Resolved POA    Hypotension [I95.9] 04/10/2022 04/22/2022 Unknown    Vomiting [R11.10] 02/26/2022 03/02/2022 No    Open wound of right hand without foreign body [S61.401A]  05/19/2022 No    Pressure ulcer of left heel, unstageable [L89.620]  05/19/2022 Unknown    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 I will follow-up with patient. Please contact us if you have any additional questions.    Damir Avalos MD  Nephrology  [facility]

## 2022-05-22 NOTE — PROGRESS NOTES
[facility]  Pulmonology  Progress Note    Patient Name: Og Garcia  MRN: [id number]  Admission Date: 12/23/2021  Hospital Length of Stay: 150 days  Code Status: Prior  Attending Provider: Cecil Abernathy DO  Primary Care Provider: Hector Arndt DNP, FNP-C   Principal Problem: Denice gangrene    Subjective:     Interval History:  Patient awake    Objective:     Vital Signs (Most Recent):  Temp: 99.8 °F (37.7 °C) (05/22/22 0420)  Pulse: 105 (05/22/22 0500)  Resp: (!) 30 (05/22/22 0500)  BP: (!) 115/57 (05/22/22 0500)  SpO2: 100 % (05/22/22 0500)   Vital Signs (24h Range):  Temp:  [98.3 °F (36.8 °C)-99.8 °F (37.7 °C)] 99.8 °F (37.7 °C)  Pulse:  [] 105  Resp:  [7-47] 30  SpO2:  [90 %-100 %] 100 %  BP: ()/(34-67) 115/57     Weight: 75.6 kg (166 lb 10.7 oz)  Body mass index is 23.25 kg/m².      Intake/Output Summary (Last 24 hours) at 5/22/2022 0632  Last data filed at 5/22/2022 0600  Gross per 24 hour   Intake 1115 ml   Output 250 ml   Net 865 ml       Physical Exam  Vitals reviewed.   Constitutional:       Appearance: Normal appearance.      Interventions: He is not intubated.  HENT:      Head: Normocephalic and atraumatic.      Nose: Nose normal.      Mouth/Throat:      Mouth: Mucous membranes are dry.      Pharynx: Oropharynx is clear.   Eyes:      Extraocular Movements: Extraocular movements intact.      Conjunctiva/sclera: Conjunctivae normal.      Pupils: Pupils are equal, round, and reactive to light.   Cardiovascular:      Rate and Rhythm: Normal rate.      Heart sounds: Normal heart sounds. No murmur heard.  Pulmonary:      Effort: Pulmonary effort is normal. He is not intubated.      Breath sounds: Normal breath sounds.   Abdominal:      General: Abdomen is flat. Bowel sounds are normal.      Palpations: Abdomen is soft.   Musculoskeletal:         General: Normal range of motion.      Cervical back: Normal range of motion and neck supple.      Right lower leg:  No edema.      Left lower leg: No edema.   Skin:     General: Skin is warm and dry.      Capillary Refill: Capillary refill takes less than 2 seconds.   Neurological:      General: No focal deficit present.      Mental Status: He is alert and oriented to person, place, and time.   Psychiatric:         Mood and Affect: Mood normal.         Behavior: Behavior normal.       Vents:  Vent Mode: CPAP / PSV (05/22/22 0044)  Ventilator Initiated: No (04/23/22 1934)  Set Rate: 0 BPM (05/18/22 0516)  Vt Set: 0 mL (05/18/22 0516)  Pressure Support: 15 cmH20 (05/22/22 0044)  PEEP/CPAP: 5 cmH20 (05/22/22 0044)  Oxygen Concentration (%): 35 (05/22/22 0445)  Peak Airway Pressure: 21 cmH2O (05/22/22 0044)  Plateau Pressure: 20 cmH20 (03/07/22 0500)  Total Ve: 7.8 mL (05/22/22 0044)  F/VT Ratio<105 (RSBI): (!) 22.64 (05/22/22 0044)    Lines/Drains/Airways       Central Venous Catheter Line  Duration                  Hemodialysis Catheter 12/30/21 0900 right internal jugular 142 days              Drain  Duration                  Colostomy 12/18/21 1030 Descending/sigmoid  days         Gastrostomy/Enterostomy 03/09/22 1436 Percutaneous endoscopic gastrostomy (PEG) midline feeding 73 days              Airway  Duration                  Surgical Airway 04/27/22 0856 Shiley;Other (Comment) Cuffed;Long 24 days              Peripheral Intravenous Line  Duration                  Peripheral IV - Single Lumen 04/11/22 1623 18 G Anterior;Proximal;Right Forearm 40 days                    Significant Labs:    CBC/Anemia Profile:  No results for input(s): WBC, HGB, HCT, PLT, MCV, RDW, IRON, FERRITIN, RETIC, FOLATE, NOVSRANA02, OCCULTBLOOD in the last 48 hours.     Chemistries:  No results for input(s): NA, K, CL, CO2, BUN, CREATININE, CALCIUM, ALBUMIN, PROT, BILITOT, ALKPHOS, ALT, AST, GLUCOSE, MG, PHOS in the last 48 hours.    Recent Lab Results  (Last 5 results in the past 24 hours)        05/22/22  0556   05/22/22  0019   05/21/22  2024    05/21/22  1740   05/21/22  1123        POC Glucose 91   101   148   135   140                              Significant Imaging:  I have reviewed all pertinent imaging results/findings within the past 24 hours.    Assessment/Plan:     * Denice gangrene  Stable with no acute issues          Chronic respiratory failure  - previously on trach collar, regressed  - doing well with trials  - can progress to trach collar continuous if tolerate  - may need PSV for rest overnight  Continue trach collar today    Hypertension  BP is stable  Currently on rate control medications (cardizem 90 mg q6 and metoprolol 12.5 bid)                     Jose Urban MD  Pulmonology  [facility]

## 2022-05-22 NOTE — PLAN OF CARE
Problem: Adult Inpatient Plan of Care  Goal: Plan of Care Review  Outcome: Ongoing, Progressing  Goal: Patient-Specific Goal (Individualized)  Outcome: Ongoing, Progressing  Goal: Absence of Hospital-Acquired Illness or Injury  Outcome: Ongoing, Progressing  Goal: Optimal Comfort and Wellbeing  Outcome: Ongoing, Progressing  Goal: Readiness for Transition of Care  Outcome: Ongoing, Progressing     Problem: Adjustment to Illness (Sepsis/Septic Shock)  Goal: Optimal Coping  Outcome: Ongoing, Progressing     Problem: Bleeding (Sepsis/Septic Shock)  Goal: Absence of Bleeding  Outcome: Ongoing, Progressing     Problem: Glycemic Control Impaired (Sepsis/Septic Shock)  Goal: Blood Glucose Level Within Desired Range  Outcome: Ongoing, Progressing     Problem: Infection Progression (Sepsis/Septic Shock)  Goal: Absence of Infection Signs and Symptoms  Outcome: Ongoing, Progressing     Problem: Nutrition Impaired (Sepsis/Septic Shock)  Goal: Optimal Nutrition Intake  Outcome: Ongoing, Progressing     Problem: Fluid and Electrolyte Imbalance (Acute Kidney Injury/Impairment)  Goal: Fluid and Electrolyte Balance  Outcome: Ongoing, Progressing     Problem: Oral Intake Inadequate (Acute Kidney Injury/Impairment)  Goal: Optimal Nutrition Intake  Outcome: Ongoing, Progressing     Problem: Renal Function Impairment (Acute Kidney Injury/Impairment)  Goal: Effective Renal Function  Outcome: Ongoing, Progressing     Problem: Infection  Goal: Absence of Infection Signs and Symptoms  Outcome: Ongoing, Progressing     Problem: Fall Injury Risk  Goal: Absence of Fall and Fall-Related Injury  Outcome: Ongoing, Progressing     Problem: Communication Impairment (Mechanical Ventilation, Invasive)  Goal: Effective Communication  Outcome: Ongoing, Progressing     Problem: Device-Related Complication Risk (Mechanical Ventilation, Invasive)  Goal: Optimal Device Function  Outcome: Ongoing, Progressing     Problem: Inability to Wean (Mechanical  Ventilation, Invasive)  Goal: Mechanical Ventilation Liberation  Outcome: Ongoing, Progressing     Problem: Nutrition Impairment (Mechanical Ventilation, Invasive)  Goal: Optimal Nutrition Delivery  Outcome: Ongoing, Progressing     Problem: Skin and Tissue Injury (Mechanical Ventilation, Invasive)  Goal: Absence of Device-Related Skin and Tissue Injury  Outcome: Ongoing, Progressing     Problem: Ventilator-Induced Lung Injury (Mechanical Ventilation, Invasive)  Goal: Absence of Ventilator-Induced Lung Injury  Outcome: Ongoing, Progressing     Problem: Communication Impairment (Artificial Airway)  Goal: Effective Communication  Outcome: Ongoing, Progressing     Problem: Device-Related Complication Risk (Artificial Airway)  Goal: Optimal Device Function  Outcome: Ongoing, Progressing     Problem: Skin and Tissue Injury (Artificial Airway)  Goal: Absence of Device-Related Skin or Tissue Injury  Outcome: Ongoing, Progressing     Problem: Skin Injury Risk Increased  Goal: Skin Health and Integrity  Outcome: Ongoing, Progressing     Problem: Device-Related Complication Risk (Hemodialysis)  Goal: Safe, Effective Therapy Delivery  Outcome: Ongoing, Progressing     Problem: Hemodynamic Instability (Hemodialysis)  Goal: Effective Tissue Perfusion  Outcome: Ongoing, Progressing     Problem: Infection (Hemodialysis)  Goal: Absence of Infection Signs and Symptoms  Outcome: Ongoing, Progressing     Problem: Diabetes Comorbidity  Goal: Blood Glucose Level Within Targeted Range  Outcome: Ongoing, Progressing     Problem: Impaired Wound Healing  Goal: Optimal Wound Healing  Outcome: Ongoing, Progressing     Problem: Gas Exchange Impaired  Goal: Optimal Gas Exchange  Outcome: Ongoing, Progressing     Problem: Breathing Pattern Ineffective  Goal: Effective Breathing Pattern  Outcome: Ongoing, Progressing

## 2022-05-22 NOTE — SUBJECTIVE & OBJECTIVE
Interval History:  Patient awake    Objective:     Vital Signs (Most Recent):  Temp: 99.8 °F (37.7 °C) (05/22/22 0420)  Pulse: 105 (05/22/22 0500)  Resp: (!) 30 (05/22/22 0500)  BP: (!) 115/57 (05/22/22 0500)  SpO2: 100 % (05/22/22 0500)   Vital Signs (24h Range):  Temp:  [98.3 °F (36.8 °C)-99.8 °F (37.7 °C)] 99.8 °F (37.7 °C)  Pulse:  [] 105  Resp:  [7-47] 30  SpO2:  [90 %-100 %] 100 %  BP: ()/(34-67) 115/57     Weight: 75.6 kg (166 lb 10.7 oz)  Body mass index is 23.25 kg/m².      Intake/Output Summary (Last 24 hours) at 5/22/2022 0632  Last data filed at 5/22/2022 0600  Gross per 24 hour   Intake 1115 ml   Output 250 ml   Net 865 ml       Physical Exam  Vitals reviewed.   Constitutional:       Appearance: Normal appearance.      Interventions: He is not intubated.  HENT:      Head: Normocephalic and atraumatic.      Nose: Nose normal.      Mouth/Throat:      Mouth: Mucous membranes are dry.      Pharynx: Oropharynx is clear.   Eyes:      Extraocular Movements: Extraocular movements intact.      Conjunctiva/sclera: Conjunctivae normal.      Pupils: Pupils are equal, round, and reactive to light.   Cardiovascular:      Rate and Rhythm: Normal rate.      Heart sounds: Normal heart sounds. No murmur heard.  Pulmonary:      Effort: Pulmonary effort is normal. He is not intubated.      Breath sounds: Normal breath sounds.   Abdominal:      General: Abdomen is flat. Bowel sounds are normal.      Palpations: Abdomen is soft.   Musculoskeletal:         General: Normal range of motion.      Cervical back: Normal range of motion and neck supple.      Right lower leg: No edema.      Left lower leg: No edema.   Skin:     General: Skin is warm and dry.      Capillary Refill: Capillary refill takes less than 2 seconds.   Neurological:      General: No focal deficit present.      Mental Status: He is alert and oriented to person, place, and time.   Psychiatric:         Mood and Affect: Mood normal.         Behavior:  Behavior normal.       Vents:  Vent Mode: CPAP / PSV (05/22/22 0044)  Ventilator Initiated: No (04/23/22 1934)  Set Rate: 0 BPM (05/18/22 0516)  Vt Set: 0 mL (05/18/22 0516)  Pressure Support: 15 cmH20 (05/22/22 0044)  PEEP/CPAP: 5 cmH20 (05/22/22 0044)  Oxygen Concentration (%): 35 (05/22/22 0445)  Peak Airway Pressure: 21 cmH2O (05/22/22 0044)  Plateau Pressure: 20 cmH20 (03/07/22 0500)  Total Ve: 7.8 mL (05/22/22 0044)  F/VT Ratio<105 (RSBI): (!) 22.64 (05/22/22 0044)    Lines/Drains/Airways       Central Venous Catheter Line  Duration                  Hemodialysis Catheter 12/30/21 0900 right internal jugular 142 days              Drain  Duration                  Colostomy 12/18/21 1030 Descending/sigmoid  days         Gastrostomy/Enterostomy 03/09/22 1436 Percutaneous endoscopic gastrostomy (PEG) midline feeding 73 days              Airway  Duration                  Surgical Airway 04/27/22 0856 Shiley;Other (Comment) Cuffed;Long 24 days              Peripheral Intravenous Line  Duration                  Peripheral IV - Single Lumen 04/11/22 1623 18 G Anterior;Proximal;Right Forearm 40 days                    Significant Labs:    CBC/Anemia Profile:  No results for input(s): WBC, HGB, HCT, PLT, MCV, RDW, IRON, FERRITIN, RETIC, FOLATE, STLVUKQI12, OCCULTBLOOD in the last 48 hours.     Chemistries:  No results for input(s): NA, K, CL, CO2, BUN, CREATININE, CALCIUM, ALBUMIN, PROT, BILITOT, ALKPHOS, ALT, AST, GLUCOSE, MG, PHOS in the last 48 hours.    Recent Lab Results  (Last 5 results in the past 24 hours)        05/22/22  0556   05/22/22  0019   05/21/22 2024   05/21/22  1740   05/21/22  1123        POC Glucose 91   101   148   135   140                              Significant Imaging:  I have reviewed all pertinent imaging results/findings within the past 24 hours.   stated

## 2022-05-23 NOTE — PROGRESS NOTES
[facility]  Nephrology  Progress Note    Patient Name: Og Garcia  MRN: [id number]  Admission Date: 12/23/2021  Hospital Length of Stay: 150 days  Attending Provider: Cecil Abernathy DO   Primary Care Physician: Hector Arndt DNP, FNP-C  Principal Problem:Denice gangrene    Consults  Subjective:     Interval History: The patient is in no distress    Review of patient's allergies indicates:  No Known Allergies  Current Facility-Administered Medications   Medication Frequency    0.9%  NaCl infusion (for blood administration) Q24H PRN    0.9%  NaCl infusion PRN    acetaminophen tablet 500 mg Q6H PRN    albuterol nebulizer solution 2.5 mg Q4H PRN    albuterol-ipratropium 2.5 mg-0.5 mg/3 mL nebulizer solution 3 mL Q6H    bisacodyL EC tablet 10 mg Daily PRN    collagenase ointment Daily PRN    dextrose 50% injection 12.5 g PRN    diltiaZEM tablet 90 mg Q6H    glucagon (human recombinant) injection 1 mg PRN    guaiFENesin 100 mg/5 ml syrup 200 mg Q6H    heparin (porcine) injection 4,000 Units PRN    heparin (porcine) injection 5,000 Units Q8H    insulin aspart U-100 injection 1-10 Units Q6H    insulin detemir U-100 injection 25 Units QHS    meropenem (MERREM) 1 g in sodium chloride 0.9 % 100 mL IVPB (MB+) Q24H    metoprolol tartrate (LOPRESSOR) split tablet 12.5 mg BID    morphine injection 3 mg Q3H PRN    pantoprazole suspension 40 mg Daily    predniSONE tablet 2.5 mg Daily    sevelamer carbonate pwpk 0.8 g TID WM    simethicone chewable tablet 80 mg TID PRN    sodium chloride 0.9% bolus 250 mL PRN    tobramycin - pharmacy to dose pharmacy to manage frequency       Objective:     Vital Signs (Most Recent):  Temp: 99.2 °F (37.3 °C) (05/22/22 1930)  Pulse: 100 (05/22/22 1956)  Resp: (!) 34 (05/22/22 1956)  BP: (!) 94/49 (05/22/22 1945)  SpO2: 99 % (05/22/22 1956)  O2 Device (Oxygen Therapy): Trach Collar (05/22/22 1956) Vital Signs (24h Range):  Temp:  [98.3 °F  (36.8 °C)-99.8 °F (37.7 °C)] 99.2 °F (37.3 °C)  Pulse:  [] 100  Resp:  [7-39] 34  SpO2:  [94 %-100 %] 99 %  BP: ()/(34-59) 94/49     Weight: 75.6 kg (166 lb 10.7 oz) (05/22/22 0600)  Body mass index is 23.25 kg/m².  Body surface area is 1.95 meters squared.    I/O last 3 completed shifts:  In: 1115 [NG/GT:1115]  Out: 250 [Stool:250]    Physical Exam   Lungs-clear  Cor-no murmur or rub  Abd-soft,nontender    Significant Labs:sure  CBC:   Recent Labs   Lab 05/20/22  0510   WBC 18.57*   RBC 2.27*   HGB 7.2*   HCT 22.3*   *   MCV 98.2*   MCH 31.7*   MCHC 32.3     CMP:   Recent Labs   Lab 05/18/22  1216 05/20/22  0510   * 57*   CALCIUM 8.7 9.0   ALBUMIN 1.4*  --    PROT 7.6  --     137   K 3.6 4.1   CO2 27 25    100   BUN 17 75*   CREATININE 0.95 2.40*   ALKPHOS 490*  --    ALT 55  --    AST 34  --    BILITOT 0.2  --      All labs within the past 24 hours have been reviewed.    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] 12/13/2021 Yes    Osteomyelitis [M86.9] 05/19/2022 Clinically Undetermined    Ischemic ulcer of right foot with necrosis of bone [L97.514] 05/19/2022 No    Ulcer of right lower extremity with necrosis of bone [L97.914] 05/17/2022 No    Tachycardia [R00.0] 04/06/2022 No    Chronic respiratory failure [J96.10] 04/06/2022 Yes    Pressure ulcer of sacral region, stage 4 [L89.154]  Yes    Fever [R50.9] 02/26/2022 No    ESRD (end stage renal disease) [N18.6] 02/23/2022 Yes    Acute blood loss anemia [D62] 12/25/2021 No    Type 2 diabetes mellitus without complication, without long-term current use of insulin [E11.9] 12/25/2021 Yes    Necrotizing fasciitis [M72.6]  Yes    Hypertension [I10] 09/02/2021 Yes    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) [I69.398, R26.9] 09/02/2021 Not Applicable      Problems Resolved During this Admission:     Diagnosis Date Noted Date Resolved POA    Hypotension [I95.9] 04/10/2022 04/22/2022 Unknown    Vomiting [R11.10] 02/26/2022 03/02/2022 No    Open wound of right hand without foreign body [S61.401A]  05/19/2022 No    Pressure ulcer of left heel, unstageable [L89.620]  05/19/2022 Unknown    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 I will follow-up with patient. Please contact us if you have any additional questions.    Damir Avalos MD  Nephrology  [facility]

## 2022-05-23 NOTE — PROGRESS NOTES
[facility]  Pulmonology  Progress Note    Patient Name: Og Garcia  MRN: [id number]  Admission Date: 12/23/2021  Hospital Length of Stay: 151 days  Code Status: Prior  Attending Provider: Cecil Abernathy DO  Primary Care Provider: Hector Arndt DNP, FNP-C   Principal Problem: Denice gangrene    Subjective:     Interval History:  Patient awake    Objective:     Vital Signs (Most Recent):  Temp: 99 °F (37.2 °C) (05/23/22 0330)  Pulse: 104 (05/23/22 0630)  Resp: (!) 32 (05/23/22 0630)  BP: (!) 105/53 (05/23/22 0600)  SpO2: 99 % (05/23/22 0630)   Vital Signs (24h Range):  Temp:  [98.4 °F (36.9 °C)-99.8 °F (37.7 °C)] 99 °F (37.2 °C)  Pulse:  [] 104  Resp:  [22-44] 32  SpO2:  [89 %-100 %] 99 %  BP: ()/(39-59) 105/53     Weight: 77.5 kg (170 lb 13.7 oz)  Body mass index is 23.83 kg/m².      Intake/Output Summary (Last 24 hours) at 5/23/2022 0639  Last data filed at 5/23/2022 0600  Gross per 24 hour   Intake 1050 ml   Output 200 ml   Net 850 ml       Physical Exam  Vitals reviewed.   Constitutional:       Appearance: Normal appearance.      Interventions: He is not intubated.  HENT:      Head: Normocephalic and atraumatic.      Nose: Nose normal.      Mouth/Throat:      Mouth: Mucous membranes are dry.      Pharynx: Oropharynx is clear.   Eyes:      Extraocular Movements: Extraocular movements intact.      Conjunctiva/sclera: Conjunctivae normal.      Pupils: Pupils are equal, round, and reactive to light.   Cardiovascular:      Rate and Rhythm: Normal rate.      Heart sounds: Normal heart sounds. No murmur heard.  Pulmonary:      Effort: Pulmonary effort is normal. He is not intubated.      Breath sounds: Normal breath sounds.   Abdominal:      General: Abdomen is flat. Bowel sounds are normal.      Palpations: Abdomen is soft.   Musculoskeletal:         General: Normal range of motion.      Cervical back: Normal range of motion and neck supple.      Right lower leg: No  edema.      Left lower leg: No edema.   Skin:     General: Skin is warm and dry.      Capillary Refill: Capillary refill takes less than 2 seconds.   Neurological:      General: No focal deficit present.      Mental Status: He is alert and oriented to person, place, and time.   Psychiatric:         Mood and Affect: Mood normal.         Behavior: Behavior normal.       Vents:  Vent Mode: CPAP / PSV (05/22/22 0044)  Ventilator Initiated: No (04/23/22 1934)  Set Rate: 0 BPM (05/18/22 0516)  Vt Set: 0 mL (05/18/22 0516)  Pressure Support: 15 cmH20 (05/22/22 0044)  PEEP/CPAP: 5 cmH20 (05/22/22 0044)  Oxygen Concentration (%): 35 (05/23/22 0524)  Peak Airway Pressure: 21 cmH2O (05/22/22 0044)  Plateau Pressure: 20 cmH20 (03/07/22 0500)  Total Ve: 7.8 mL (05/22/22 0044)  F/VT Ratio<105 (RSBI): (!) 22.64 (05/22/22 0044)    Lines/Drains/Airways       Central Venous Catheter Line  Duration                  Hemodialysis Catheter 12/30/21 0900 right internal jugular 143 days              Drain  Duration                  Colostomy 12/18/21 1030 Descending/sigmoid  days         Gastrostomy/Enterostomy 03/09/22 1436 Percutaneous endoscopic gastrostomy (PEG) midline feeding 74 days              Airway  Duration                  Surgical Airway 04/27/22 0856 Shiley;Other (Comment) Cuffed;Long 25 days              Peripheral Intravenous Line  Duration                  Peripheral IV - Single Lumen 04/11/22 1623 18 G Anterior;Proximal;Right Forearm 41 days                    Significant Labs:    CBC/Anemia Profile:  No results for input(s): WBC, HGB, HCT, PLT, MCV, RDW, IRON, FERRITIN, RETIC, FOLATE, [id number], OCCULTBLOOD in the last 48 hours.     Chemistries:  No results for input(s): NA, K, CL, CO2, BUN, CREATININE, CALCIUM, ALBUMIN, PROT, BILITOT, ALKPHOS, ALT, AST, GLUCOSE, MG, PHOS in the last 48 hours.    Recent Lab Results         05/23/22  0511   05/22/22  2317   05/22/22  1754   05/22/22  1205        POC Glucose 115    127   120   78               Significant Imaging:  I have reviewed all pertinent imaging results/findings within the past 24 hours.    Assessment/Plan:     * Denice gangrene  Stable with no acute issues          Chronic respiratory failure  Patient completely on trach collar trials will continue that for now will look at his trach size and see about downsizing middle the week    Hypertension  BP is stable  Currently on rate control medications (cardizem 90 mg q6 and metoprolol 12.5 bid)        ESRD (end stage renal disease)  Started on HD 12/30   Continue with HD per nephrology      Type 2 diabetes mellitus without complication, without long-term current use of insulin  Blood sugar is well controlled    Acute blood loss anemia  No active bleeding  Hgb 6.0 on 4/11  Patient transfused 1 unit with dialysis yesterday  4/13 -H/H 7.5/22.7  4/15- hgb is 6.2 today. We are going to give another unit of PRBC's today with dialysis  4/16 post transfusion cbc is pending  4/18- hgb is 6.6. 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

## 2022-05-23 NOTE — PLAN OF CARE
Problem: Adult Inpatient Plan of Care  Goal: Plan of Care Review  Outcome: Ongoing, Progressing  Goal: Patient-Specific Goal (Individualized)  Outcome: Ongoing, Progressing  Goal: Optimal Comfort and Wellbeing  Outcome: Ongoing, Progressing     Problem: Adjustment to Illness (Sepsis/Septic Shock)  Goal: Optimal Coping  Outcome: Ongoing, Progressing     Problem: Bleeding (Sepsis/Septic Shock)  Goal: Absence of Bleeding  Outcome: Ongoing, Progressing     Problem: Glycemic Control Impaired (Sepsis/Septic Shock)  Goal: Blood Glucose Level Within Desired Range  Outcome: Ongoing, Progressing     Problem: Infection Progression (Sepsis/Septic Shock)  Goal: Absence of Infection Signs and Symptoms  Outcome: Ongoing, Progressing     Problem: Oral Intake Inadequate (Acute Kidney Injury/Impairment)  Goal: Optimal Nutrition Intake  Outcome: Ongoing, Progressing     Problem: Infection  Goal: Absence of Infection Signs and Symptoms  Outcome: Ongoing, Progressing     Problem: Nutrition Impairment (Mechanical Ventilation, Invasive)  Goal: Optimal Nutrition Delivery  Outcome: Ongoing, Progressing     Problem: Adult Inpatient Plan of Care  Goal: Plan of Care Review  Outcome: Ongoing, Progressing  Goal: Patient-Specific Goal (Individualized)  Outcome: Ongoing, Progressing  Goal: Optimal Comfort and Wellbeing  Outcome: Ongoing, Progressing     Problem: Adjustment to Illness (Sepsis/Septic Shock)  Goal: Optimal Coping  Outcome: Ongoing, Progressing     Problem: Bleeding (Sepsis/Septic Shock)  Goal: Absence of Bleeding  Outcome: Ongoing, Progressing     Problem: Glycemic Control Impaired (Sepsis/Septic Shock)  Goal: Blood Glucose Level Within Desired Range  Outcome: Ongoing, Progressing     Problem: Infection Progression (Sepsis/Septic Shock)  Goal: Absence of Infection Signs and Symptoms  Outcome: Ongoing, Progressing     Problem: Oral Intake Inadequate (Acute Kidney Injury/Impairment)  Goal: Optimal Nutrition Intake  Outcome: Ongoing,  Progressing     Problem: Infection  Goal: Absence of Infection Signs and Symptoms  Outcome: Ongoing, Progressing     Problem: Nutrition Impairment (Mechanical Ventilation, Invasive)  Goal: Optimal Nutrition Delivery  Outcome: Ongoing, Progressing     Problem: Adult Inpatient Plan of Care  Goal: Readiness for Transition of Care  Outcome: Ongoing, Not Progressing     Problem: Fluid and Electrolyte Imbalance (Acute Kidney Injury/Impairment)  Goal: Fluid and Electrolyte Balance  Outcome: Ongoing, Not Progressing     Problem: Renal Function Impairment (Acute Kidney Injury/Impairment)  Goal: Effective Renal Function  Outcome: Ongoing, Not Progressing     Problem: Adult Inpatient Plan of Care  Goal: Plan of Care Review  Outcome: Ongoing, Progressing  Goal: Patient-Specific Goal (Individualized)  Outcome: Ongoing, Progressing  Goal: Optimal Comfort and Wellbeing  Outcome: Ongoing, Progressing     Problem: Adjustment to Illness (Sepsis/Septic Shock)  Goal: Optimal Coping  Outcome: Ongoing, Progressing     Problem: Bleeding (Sepsis/Septic Shock)  Goal: Absence of Bleeding  Outcome: Ongoing, Progressing     Problem: Glycemic Control Impaired (Sepsis/Septic Shock)  Goal: Blood Glucose Level Within Desired Range  Outcome: Ongoing, Progressing     Problem: Infection Progression (Sepsis/Septic Shock)  Goal: Absence of Infection Signs and Symptoms  Outcome: Ongoing, Progressing     Problem: Oral Intake Inadequate (Acute Kidney Injury/Impairment)  Goal: Optimal Nutrition Intake  Outcome: Ongoing, Progressing     Problem: Infection  Goal: Absence of Infection Signs and Symptoms  Outcome: Ongoing, Progressing     Problem: Nutrition Impairment (Mechanical Ventilation, Invasive)  Goal: Optimal Nutrition Delivery  Outcome: Ongoing, Progressing

## 2022-05-23 NOTE — PROGRESS NOTES
Pharmacist Renal Dose Adjustment Note    Og Garcia is a 67 y.o. male being treated with the medication meropenem    Patient Data:    Vital Signs (Most Recent):  Temp: 98.6 °F (37 °C) (05/23/22 0800)  Pulse: 102 (05/23/22 1224)  Resp: (!) 37 (05/23/22 1224)  BP: (!) 86/48 (05/23/22 1200)  SpO2: 100 % (05/23/22 1224)   Vital Signs (72h Range):  Temp:  [98.3 °F (36.8 °C)-100.2 °F (37.9 °C)]   Pulse:  []   Resp:  [7-47]   BP: ()/(34-67)   SpO2:  [89 %-100 %]      Recent Labs   Lab 05/18/22  1216 05/20/22  0510 05/23/22  0636   CREATININE 0.95 2.40* 2.48*     Serum creatinine: 2.48 mg/dL (H) 05/23/22 0636  Estimated creatinine clearance: 30.8 mL/min (A)    Medication:meropenem dose: 1 gram frequency q24h will be changed to medication:meropenem dose:500 mg frequency:q24h    Pharmacist's Name: Kavitha Golden  Pharmacist's Extension: [phone]

## 2022-05-23 NOTE — ASSESSMENT & PLAN NOTE
Patient completely on trach collar trials will continue that for now will look at his trach size and see about downsizing middle the week

## 2022-05-23 NOTE — PROGRESS NOTES
[facility]  Adult Nutrition  Follow-up Note         Reason for Assessment  Reason For Assessment: RD follow-up  Nutrition Risk Screen: tube feeding or parenteral nutrition, large or nonhealing wound, burn or pressure injury  Malnutrition  Is Patient Malnourished: No  Nutrition Diagnosis  Increased protein Needs   related to Decreased/ impaired skin integrity as evidenced by wounds    Nutrition Diagnosis Status: Improving      Nutrition Risk  Level of Risk/Frequency of Follow-up: high   Chewing or Swallowing Difficulty?: Swallowing difficulty  Estimated/Assessed Needs  RMR ([place] Jeor Equation): 1572.13 Activity Factor: 1 Injury Factor: 1.2   Total Ve: 7.8 mL Temp: 98.6 °F (37 °C)Axillary  Weight Used For Calorie Calculations: 78.3 kg (172 lb 9.9 oz)   Energy Need Method: [facility] Energy Calorie Requirements (kcal): 1608  Weight Used For Protein Calculations: 73.6 kg (162 lb 4.1 oz)  Protein Requirements:   Estimated Fluid Requirement Method: RDA Method Fluid Requirements (mL): 2105  RDA Method (mL): 1608     Nutrition Prescription / Recommendations  Recommendation/Intervention: PEG tube feeding: Nepro @ 60ml/hr; H20 flush of 50ml q 2hr  Goals: TF tolerance, Pt will meet est nutr needs, wound healing, wt maintenance  Nutrition Goal Status: progressing towards goal  Communication of RD Recs: discussed on rounds  Current Diet Order: PEG tube feeding: Nepro @ 60ml/hr; H20 flush of 50ml q 2hr  Nutrition Order Comments: PEG tube feeding:Nepro @ 60ml/hr; H20 flush of 50ml q 4hr  Current Nutrition Support Formula Ordered: Nepro  Current Nutrition Support Rate Ordered: 60 (ml)  Current Nutrition Support Frequency Ordered: hourly  Recommended Diet: Enteral Nutrition PEG tube feeding: Nepro @ 60ml/hr; H20 flush of 50ml q 2hr  Recommended Oral Supplement: Matthew [90 kcals, 2.5g Protein, 10g Carbs(3g Sugar), 7g L-Arginine, 7g L-Glutamine, Vitamin C 300mg, 9.5mg Zinc] twice daily  Is Nutrition  Support Recommended: No  Is Education Recommended: No  Monitor and Evaluation  % current Intake: Enteral Nutrition at goal  % intake to meet estimated needs: Enteral Nutrition   Food and Nutrient Intake: enteral nutrition intake  Food and Nutrient Adminstration: enteral and parenteral nutrition administration  Anthropometric Measurements: height/length, weight, weight change, body mass index  Biochemical Data, Medical Tests and Procedures: electrolyte and renal panel, glucose/endocrine profile  Nutrition-Focused Physical Findings: skin  Enteral Calories (kcal): 2592  Enteral Protein (gm): 115  Enteral (Free Water) Fluid (mL): 1037  Free Water Flush Fluid (mL): 300  Parenteral Calories (kcal): 845  Parenteral Protein (gm): 48  Parenteral Fluid (mL): 960  Lipid Calories (kcals): 500 kcals  Other Calories (kcal): 528 (propofol)  Total Calories (kcal): 2160  Total Calories (kcal/kg): 1337  % Kcal Needs: 100  Total Protein (gm): 135  % Protein Needs: 100  IV Fluid (mL): 1337  Total Fluid Intake (mL): 1337  Energy Calories Required: meeting needs  Protein Required: meeting needs  Fluid Required: meeting needs  Tolerance: tolerating  Current Medical Diagnosis and Past Medical History  Diagnosis: renal disease, gastrointestinal disease, infection/sepsis  Past Medical History:   Diagnosis Date    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    Hyperlipidemia     Hypertension     On mechanically assisted ventilation 12/14/2021    Pressure ulcer of left heel, unstageable      Nutrition/Diet History  Spiritual, Cultural Beliefs, Caodaism Practices, Values that Affect Care: no  Supplemental Drinks or Food Habits: Matthew, Nepro  Food Allergies: NKFA  Factors Affecting Nutritional Intake: None identified at this time  Lab/Procedures/Meds  Recent Labs   Lab 05/23/22  0636   *   K 3.9   BUN 71*   CREATININE 2.48*   *   CALCIUM 9.0   CL 97*     Last A1c: No results found for: HGBA1C  Lab Results   Component Value Date    RBC  "2.35 (L) 05/23/2022    HGB 7.4 (L) 05/23/2022    HCT 23.7 (L) 05/23/2022    .9 (H) 05/23/2022    MCH 31.5 (H) 05/23/2022    MCHC 31.2 (L) 05/23/2022    TIBC 152 (L) 05/04/2022     Pertinent Labs Reviewed: reviewed  Pertinent Labs Comments: na 133, k 3.7, crea 2.85, GFR 24  Pertinent Medications Reviewed: reviewed  Pertinent Medications Comments: heparin, insulin  Anthropometrics  Temp: 98.6 °F (37 °C)  Height Method: Stated  Height: 5' 11" (180.3 cm)  Height (inches): 71 in  Weight Method: Bed Scale  Weight: 77.5 kg (170 lb 13.7 oz)  Weight (lb): 170.86 lb  Ideal Body Weight (IBW), Male: 172 lb  % Ideal Body Weight, Male (lb): 94.34 %  BMI (Calculated): 23.8  BMI Grade: 18.5-24.9 - normal     Nutrition by Nursing  Diet/Nutrition Received: tube feeding  Intake (%): 100%  Diet/Feeding Assistance: total feed  Diet/Feeding Tolerance: good  Last Bowel Movement: 05/23/22  [REMOVED]      NG/OG Tube [place] sump 18 Fr. Left nostril-Feeding Type: continuous, by pump       Gastrostomy/Enterostomy 03/09/22 1436 Percutaneous endoscopic gastrostomy (PEG) midline feeding-Feeding Type: continuous, by pump  [REMOVED]      NG/OG Tube [place] sump 18 Fr. Left nostril-Current Rate (mL/hr): 50 mL/hr       Gastrostomy/Enterostomy 03/09/22 1436 Percutaneous endoscopic gastrostomy (PEG) midline feeding-Current Rate (mL/hr): 60 mL/hr  [REMOVED]      NG/OG Tube [place] sump 18 Fr. Left nostril-Goal Rate (mL/hr): 75 mL/hr       Gastrostomy/Enterostomy 03/09/22 1436 Percutaneous endoscopic gastrostomy (PEG) midline feeding-Goal Rate (mL/hr): 60 mL/hr  [REMOVED]      NG/OG Tube [place] sump 18 Fr. Left nostril-Formula Name: nepro 1.8       Gastrostomy/Enterostomy 03/09/22 1436 Percutaneous endoscopic gastrostomy (PEG) midline feeding-Formula Name: [place] 1.8  Nutrition Follow-Up  RD Follow-up?: Yes  Assessment and Plan  No new Assessment & Plan notes have been filed under this hospital service since the last note was generated.  Service: " Nutrition

## 2022-05-23 NOTE — SUBJECTIVE & OBJECTIVE
Interval History:  Patient awake    Objective:     Vital Signs (Most Recent):  Temp: 99 °F (37.2 °C) (05/23/22 0330)  Pulse: 104 (05/23/22 0630)  Resp: (!) 32 (05/23/22 0630)  BP: (!) 105/53 (05/23/22 0600)  SpO2: 99 % (05/23/22 0630)   Vital Signs (24h Range):  Temp:  [98.4 °F (36.9 °C)-99.8 °F (37.7 °C)] 99 °F (37.2 °C)  Pulse:  [] 104  Resp:  [22-44] 32  SpO2:  [89 %-100 %] 99 %  BP: ()/(39-59) 105/53     Weight: 77.5 kg (170 lb 13.7 oz)  Body mass index is 23.83 kg/m².      Intake/Output Summary (Last 24 hours) at 5/23/2022 0639  Last data filed at 5/23/2022 0600  Gross per 24 hour   Intake 1050 ml   Output 200 ml   Net 850 ml       Physical Exam  Vitals reviewed.   Constitutional:       Appearance: Normal appearance.      Interventions: He is not intubated.  HENT:      Head: Normocephalic and atraumatic.      Nose: Nose normal.      Mouth/Throat:      Mouth: Mucous membranes are dry.      Pharynx: Oropharynx is clear.   Eyes:      Extraocular Movements: Extraocular movements intact.      Conjunctiva/sclera: Conjunctivae normal.      Pupils: Pupils are equal, round, and reactive to light.   Cardiovascular:      Rate and Rhythm: Normal rate.      Heart sounds: Normal heart sounds. No murmur heard.  Pulmonary:      Effort: Pulmonary effort is normal. He is not intubated.      Breath sounds: Normal breath sounds.   Abdominal:      General: Abdomen is flat. Bowel sounds are normal.      Palpations: Abdomen is soft.   Musculoskeletal:         General: Normal range of motion.      Cervical back: Normal range of motion and neck supple.      Right lower leg: No edema.      Left lower leg: No edema.   Skin:     General: Skin is warm and dry.      Capillary Refill: Capillary refill takes less than 2 seconds.   Neurological:      General: No focal deficit present.      Mental Status: He is alert and oriented to person, place, and time.   Psychiatric:         Mood and Affect: Mood normal.         Behavior:  Behavior normal.       Vents:  Vent Mode: CPAP / PSV (05/22/22 0044)  Ventilator Initiated: No (04/23/22 1934)  Set Rate: 0 BPM (05/18/22 0516)  Vt Set: 0 mL (05/18/22 0516)  Pressure Support: 15 cmH20 (05/22/22 0044)  PEEP/CPAP: 5 cmH20 (05/22/22 0044)  Oxygen Concentration (%): 35 (05/23/22 0524)  Peak Airway Pressure: 21 cmH2O (05/22/22 0044)  Plateau Pressure: 20 cmH20 (03/07/22 0500)  Total Ve: 7.8 mL (05/22/22 0044)  F/VT Ratio<105 (RSBI): (!) 22.64 (05/22/22 0044)    Lines/Drains/Airways       Central Venous Catheter Line  Duration                  Hemodialysis Catheter 12/30/21 0900 right internal jugular 143 days              Drain  Duration                  Colostomy 12/18/21 1030 Descending/sigmoid  days         Gastrostomy/Enterostomy 03/09/22 1436 Percutaneous endoscopic gastrostomy (PEG) midline feeding 74 days              Airway  Duration                  Surgical Airway 04/27/22 0856 Shiley;Other (Comment) Cuffed;Long 25 days              Peripheral Intravenous Line  Duration                  Peripheral IV - Single Lumen 04/11/22 1623 18 G Anterior;Proximal;Right Forearm 41 days                    Significant Labs:    CBC/Anemia Profile:  No results for input(s): WBC, HGB, HCT, PLT, MCV, RDW, IRON, FERRITIN, RETIC, FOLATE, [id number], OCCULTBLOOD in the last 48 hours.     Chemistries:  No results for input(s): NA, K, CL, CO2, BUN, CREATININE, CALCIUM, ALBUMIN, PROT, BILITOT, ALKPHOS, ALT, AST, GLUCOSE, MG, PHOS in the last 48 hours.    Recent Lab Results         05/23/22  0511   05/22/22  2317   05/22/22  1754   05/22/22  1205        POC Glucose 115   127   120   78               Significant Imaging:  I have reviewed all pertinent imaging results/findings within the past 24 hours.

## 2022-05-23 NOTE — SUBJECTIVE & OBJECTIVE
Interval History: The patient is resting.  He tolerated dialysis earlier today.  He continues on trach collar and is doing well.    Review of patient's allergies indicates:  No Known Allergies  Current Facility-Administered Medications   Medication Frequency    0.9%  NaCl infusion (for blood administration) Q24H PRN    0.9%  NaCl infusion PRN    acetaminophen tablet 500 mg Q6H PRN    albuterol nebulizer solution 2.5 mg Q4H PRN    albuterol-ipratropium 2.5 mg-0.5 mg/3 mL nebulizer solution 3 mL Q6H    bisacodyL EC tablet 10 mg Daily PRN    collagenase ointment Daily PRN    dextrose 50% injection 12.5 g PRN    diltiaZEM tablet 90 mg Q6H    glucagon (human recombinant) injection 1 mg PRN    guaiFENesin 100 mg/5 ml syrup 200 mg Q6H    heparin (porcine) injection 4,000 Units PRN    heparin (porcine) injection 5,000 Units Q8H    insulin aspart U-100 injection 1-10 Units Q6H    insulin detemir U-100 injection 25 Units QHS    meropenem (MERREM) 500 mg in sodium chloride 0.9 % 100 mL IVPB (MB+) Q24H    metoprolol tartrate (LOPRESSOR) split tablet 12.5 mg BID    morphine injection 3 mg Q3H PRN    pantoprazole suspension 40 mg Daily    predniSONE tablet 2.5 mg Daily    sevelamer carbonate pwpk 0.8 g TID WM    simethicone chewable tablet 80 mg TID PRN    sodium chloride 0.9% bolus 250 mL PRN    tobramycin - pharmacy to dose pharmacy to manage frequency       Objective:     Vital Signs (Most Recent):  Temp: 98.1 °F (36.7 °C) (05/23/22 1600)  Pulse: (!) 114 (05/23/22 1700)  Resp: (!) 43 (05/23/22 1700)  BP: (!) 102/51 (05/23/22 1700)  SpO2: 99 % (05/23/22 1700)  O2 Device (Oxygen Therapy): Trach Collar (05/23/22 1705)   Vital Signs (24h Range):  Temp:  [98.1 °F (36.7 °C)-99.2 °F (37.3 °C)] 98.1 °F (36.7 °C)  Pulse:  [] 114  Resp:  [22-44] 43  SpO2:  [89 %-100 %] 99 %  BP: ()/(39-67) 102/51     Weight: 77.5 kg (170 lb 13.7 oz) (05/23/22 0400)  Body mass index is 23.83 kg/m².  Body surface area is 1.97 meters  squared.    I/O last 3 completed shifts:  In: 2165 [NG/GT:2165]  Out: 200 [Stool:200]    Physical Exam  Vitals reviewed.   Constitutional:       Appearance: He is obese.   HENT:      Head: Normocephalic.      Mouth/Throat:      Mouth: Mucous membranes are moist.   Cardiovascular:      Rate and Rhythm: Regular rhythm.      Pulses: Normal pulses.   Pulmonary:      Effort: Pulmonary effort is normal.   Abdominal:      Palpations: Abdomen is soft.   Skin:     General: Skin is warm.   Neurological:      Mental Status: He is alert.       Significant Labs:  BMP:   Recent Labs   Lab 05/23/22  0636   *   *   K 3.9   CL 97*   CO2 25   BUN 71*   CREATININE 2.48*   CALCIUM 9.0     CBC:   Recent Labs   Lab 05/23/22  0636   WBC 16.58*   RBC 2.35*   HGB 7.4*   HCT 23.7*   *   .9*   MCH 31.5*   MCHC 31.2*        Significant Imaging:  Labs: Reviewed

## 2022-05-23 NOTE — PROGRESS NOTES
[facility]  Nephrology  Progress Note    Patient Name: Og Garcia  MRN: [id number]  Admission Date: 12/23/2021  Hospital Length of Stay: 151 days  Attending Provider: Cecil Abernathy DO   Primary Care Physician: Hector Arndt DNP, FNP-C  Principal Problem:Denice gangrene    Subjective:     HPI: The patient is known from the previous nephrology consult at [facility].  He is no at Specialty and continues to need dialysis support.      Interval History: The patient is resting.  He tolerated dialysis earlier today.  He continues on trach collar and is doing well.    Review of patient's allergies indicates:  No Known Allergies  Current Facility-Administered Medications   Medication Frequency    0.9%  NaCl infusion (for blood administration) Q24H PRN    0.9%  NaCl infusion PRN    acetaminophen tablet 500 mg Q6H PRN    albuterol nebulizer solution 2.5 mg Q4H PRN    albuterol-ipratropium 2.5 mg-0.5 mg/3 mL nebulizer solution 3 mL Q6H    bisacodyL EC tablet 10 mg Daily PRN    collagenase ointment Daily PRN    dextrose 50% injection 12.5 g PRN    diltiaZEM tablet 90 mg Q6H    glucagon (human recombinant) injection 1 mg PRN    guaiFENesin 100 mg/5 ml syrup 200 mg Q6H    heparin (porcine) injection 4,000 Units PRN    heparin (porcine) injection 5,000 Units Q8H    insulin aspart U-100 injection 1-10 Units Q6H    insulin detemir U-100 injection 25 Units QHS    meropenem (MERREM) 500 mg in sodium chloride 0.9 % 100 mL IVPB (MB+) Q24H    metoprolol tartrate (LOPRESSOR) split tablet 12.5 mg BID    morphine injection 3 mg Q3H PRN    pantoprazole suspension 40 mg Daily    predniSONE tablet 2.5 mg Daily    sevelamer carbonate pwpk 0.8 g TID WM    simethicone chewable tablet 80 mg TID PRN    sodium chloride 0.9% bolus 250 mL PRN    tobramycin - pharmacy to dose pharmacy to manage frequency       Objective:     Vital Signs (Most Recent):  Temp: 98.1 °F (36.7 °C) (05/23/22  1600)  Pulse: (!) 114 (05/23/22 1700)  Resp: (!) 43 (05/23/22 1700)  BP: (!) 102/51 (05/23/22 1700)  SpO2: 99 % (05/23/22 1700)  O2 Device (Oxygen Therapy): Trach Collar (05/23/22 1705)   Vital Signs (24h Range):  Temp:  [98.1 °F (36.7 °C)-99.2 °F (37.3 °C)] 98.1 °F (36.7 °C)  Pulse:  [] 114  Resp:  [22-44] 43  SpO2:  [89 %-100 %] 99 %  BP: ()/(39-67) 102/51     Weight: 77.5 kg (170 lb 13.7 oz) (05/23/22 0400)  Body mass index is 23.83 kg/m².  Body surface area is 1.97 meters squared.    I/O last 3 completed shifts:  In: 2165 [NG/GT:2165]  Out: 200 [Stool:200]    Physical Exam  Vitals reviewed.   Constitutional:       Appearance: He is obese.   HENT:      Head: Normocephalic.      Mouth/Throat:      Mouth: Mucous membranes are moist.   Cardiovascular:      Rate and Rhythm: Regular rhythm.      Pulses: Normal pulses.   Pulmonary:      Effort: Pulmonary effort is normal.   Abdominal:      Palpations: Abdomen is soft.   Skin:     General: Skin is warm.   Neurological:      Mental Status: He is alert.       Significant Labs:  BMP:   Recent Labs   Lab 05/23/22  0636   *   *   K 3.9   CL 97*   CO2 25   BUN 71*   CREATININE 2.48*   CALCIUM 9.0     CBC:   Recent Labs   Lab 05/23/22  0636   WBC 16.58*   RBC 2.35*   HGB 7.4*   HCT 23.7*   *   .9*   MCH 31.5*   MCHC 31.2*        Significant Imaging:  Labs: Reviewed    Assessment/Plan:     ESRD (end stage renal disease)  5/23/2022Dialysis MWF        Hypertension   .  5/23/2022 Blood pressure has been stable.        Thank you for your consult. I will follow-up with patient. Please contact us if you have any additional questions.    Edwin Pryor Jr, MD  Nephrology  [facility]

## 2022-05-24 NOTE — SUBJECTIVE & OBJECTIVE
Interval History:  Patient without any history seems alert    Objective:     Vital Signs (Most Recent):  Temp: (!) 100.9 °F (38.3 °C) (05/24/22 0300)  Pulse: 104 (05/24/22 0300)  Resp: (!) 42 (05/24/22 0300)  BP: (!) 91/50 (05/24/22 0300)  SpO2: 100 % (05/24/22 0300)   Vital Signs (24h Range):  Temp:  [98.1 °F (36.7 °C)-100.9 °F (38.3 °C)] 100.9 °F (38.3 °C)  Pulse:  [] 104  Resp:  [25-45] 42  SpO2:  [96 %-100 %] 100 %  BP: ()/(40-67) 91/50     Weight: 77.5 kg (170 lb 13.7 oz)  Body mass index is 23.83 kg/m².      Intake/Output Summary (Last 24 hours) at 5/24/2022 0519  Last data filed at 5/23/2022 1706  Gross per 24 hour   Intake 2425 ml   Output 2300 ml   Net 125 ml       Physical Exam  Vitals reviewed.   Constitutional:       Appearance: Normal appearance.      Interventions: He is not intubated.     Comments: Tracheostomy lack of and I have on the right side   HENT:      Head: Normocephalic and atraumatic.      Nose: Nose normal.      Mouth/Throat:      Mouth: Mucous membranes are dry.      Pharynx: Oropharynx is clear.   Eyes:      Extraocular Movements: Extraocular movements intact.      Conjunctiva/sclera: Conjunctivae normal.      Pupils: Pupils are equal, round, and reactive to light.   Cardiovascular:      Rate and Rhythm: Normal rate.      Heart sounds: Normal heart sounds. No murmur heard.  Pulmonary:      Effort: Pulmonary effort is normal. He is not intubated.      Breath sounds: Normal breath sounds.   Abdominal:      General: Abdomen is flat. Bowel sounds are normal.      Palpations: Abdomen is soft.   Musculoskeletal:         General: Normal range of motion.      Cervical back: Normal range of motion and neck supple.      Right lower leg: No edema.      Left lower leg: No edema.   Skin:     General: Skin is warm and dry.      Capillary Refill: Capillary refill takes less than 2 seconds.   Neurological:      General: No focal deficit present.      Mental Status: He is alert and oriented  to person, place, and time.   Psychiatric:         Mood and Affect: Mood normal.         Behavior: Behavior normal.       Vents:  Vent Mode: CPAP / PSV (05/22/22 0044)  Ventilator Initiated: No (04/23/22 1934)  Set Rate: 0 BPM (05/18/22 0516)  Vt Set: 0 mL (05/18/22 0516)  Pressure Support: 15 cmH20 (05/22/22 0044)  PEEP/CPAP: 5 cmH20 (05/22/22 0044)  Oxygen Concentration (%): 35 (05/24/22 0000)  Peak Airway Pressure: 21 cmH2O (05/22/22 0044)  Plateau Pressure: 20 cmH20 (03/07/22 0500)  Total Ve: 7.8 mL (05/22/22 0044)  F/VT Ratio<105 (RSBI): (!) 22.64 (05/22/22 0044)    Lines/Drains/Airways       Central Venous Catheter Line  Duration                  Hemodialysis Catheter 12/30/21 0900 right internal jugular 144 days              Drain  Duration                  Colostomy 12/18/21 1030 Descending/sigmoid  days         Gastrostomy/Enterostomy 03/09/22 1436 Percutaneous endoscopic gastrostomy (PEG) midline feeding 75 days              Airway  Duration                  Surgical Airway 04/27/22 0856 Shiley;Other (Comment) Cuffed;Long 26 days              Peripheral Intravenous Line  Duration                  Peripheral IV - Single Lumen 04/11/22 1623 18 G Anterior;Proximal;Right Forearm 42 days                    Significant Labs:    CBC/Anemia Profile:  Recent Labs   Lab 05/23/22  0636   WBC 16.58*   HGB 7.4*   HCT 23.7*   *   .9*   RDW 15.2*        Chemistries:  Recent Labs   Lab 05/23/22  0636   *   K 3.9   CL 97*   CO2 25   BUN 71*   CREATININE 2.48*   CALCIUM 9.0       Recent Lab Results  (Last 5 results in the past 24 hours)        05/24/22  0329   05/23/22  2132   05/23/22  1812   05/23/22  1300   05/23/22  0636        Anion Gap         16       Baso #         0.05       Basophil %         0.3       BUN         71       BUN/CREAT RATIO         29       Calcium         9.0       Chloride         97       CO2         25       Creatinine         2.48       Differential Type          Auto       eGFR if non          28       Eos #         0.58       Eosinophil %         3.5       Glucose         110       Hematocrit         23.7       Hemoglobin         7.4       Immature Grans (Abs)         0.53       Immature Granulocytes         3.2       Lymph #         3.32       Lymph %         20.0       MCH         31.5       MCHC         31.2       MCV         100.9       Mono #         1.76       Mono %         10.6       MPV         8.2       Neutrophils, Abs         10.34       Neutrophils Relative         62.4       nRBC         0.1       NUCLEATED RBC ABSOLUTE         0.02       Platelets         536       POC Glucose 118   129   129   106         Potassium         3.9       RBC         2.35       RDW         15.2       Sodium         134       WBC         16.58                              Significant Imaging:  I have reviewed all pertinent imaging results/findings within the past 24 hours.

## 2022-05-24 NOTE — PROGRESS NOTES
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 male with wounds to sacral, left lateral lower leg, right anterior foot, right hand, left posterior heel, and right lateral and medial foot.  Wounds continue to show signs of necrosis, surgical interventions, and adherence to turn protocols. Bone culture and pathology pending. Patient continues to run fever, remains hypotensive, . 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 He is currently tolerating trach collar. He has been to OR multiple times to debridements, IV antibiotics, and bedside debridements. Pertinent medical history includes diabetes, hypertension, anemia, chronic respiratory failure, and infection. Factors that complicate wound healing include ESRD, chronic anemia,chronic respiratory failure, multiple co-morbidities, poor vascular supply, diabetes, decreased granulation tissue, necrosis and infection.           Review of Systems   Unable to perform ROS: Acuity of condition     Objective:     Vital Signs (Most Recent):  Temp: (!) 100.9 °F (38.3 °C) (05/24/22 0300)  Pulse: (!) 117 (05/24/22 1201)  Resp: (!) 37 (05/24/22 1201)  BP: (!) 96/51 (05/24/22 1302)  SpO2: 100 % (05/24/22 1201) Vital Signs (24h Range):  Temp:  [98.1 °F (36.7 °C)-100.9 °F (38.3 °C)] 100.9 °F (38.3 °C)  Pulse:  [101-117] 117  Resp:  [29-45] 37  SpO2:  [96 %-100 %] 100 %  BP: ()/(42-58) 96/51     Weight: 74 kg (163 lb 2.3 oz)  Body mass index is 22.75 kg/m².  No data recorded    Physical  Exam  Vitals reviewed.   Constitutional:       Appearance: He is ill-appearing.      Comments: Chronically ill   HENT:      Head: Normocephalic.   Cardiovascular:      Rate and Rhythm: Regular rhythm. Tachycardia present.   Pulmonary:      Effort: Pulmonary effort is normal.   Skin:     Findings: Erythema present.      Comments: Multiple wounds   Neurological:      Mental Status: Mental status is at baseline.      Sensory: Sensory deficit present.      Motor: Weakness present.               Altered Skin Integrity 01/20/22 1000 Right anterior Foot Purple or maroon localized area of discolored intact skin or non-intact skin or a blood-filled blister. (Active)   01/20/22 1000   Altered Skin Integrity Present on Admission: suspected hospital acquired   Side: Right   Orientation: anterior   Location: Foot   Wound Number:    Is this injury device related?:    Primary Wound Type:    Description of Altered Skin Integrity: Purple or maroon localized area of discolored intact skin or non-intact skin or a blood-filled blister.   Removal Indication and Assessment:    Wound Outcome:    (Retired) Wound Length (cm):    (Retired) Wound Width (cm):    (Retired) Depth (cm):    Wound Description (Comments):    Removal Indications:    Wound Image    05/17/22 1300   Description of Altered Skin Integrity Full thickness tissue loss. Base is covered by slough and/or eschar in the wound bed 05/17/22 1300   Dressing Appearance Dry 05/23/22 0715   Drainage Amount Scant 05/23/22 0715   Drainage Characteristics/Odor Serous;Chung 05/23/22 0715   Appearance Dressing in place, unable to visualize 05/23/22 0715   Tissue loss description Not applicable 05/19/22 1330   Black (%), Wound Tissue Color 0 % 05/17/22 1300   Red (%), Wound Tissue Color 5 % 05/17/22 1300   Yellow (%), Wound Tissue Color 95 % 05/17/22 1300   Periwound Area Moist 05/19/22 1330   Wound Edges Defined;Open 05/19/22 1330   Wound Length (cm) 2 cm 05/17/22 1300   Wound Width (cm) 1 cm  05/17/22 1300   Wound Depth (cm) 0.5 cm 05/17/22 1300   Wound Volume (cm^3) 1 cm^3 05/17/22 1300   Wound Surface Area (cm^2) 2 cm^2 05/17/22 1300   Care Cleansed with:;Soap and water;Antimicrobial agent 05/22/22 1400   Dressing Removed;Changed 05/22/22 1400   Periwound Care Moisture barrier applied 05/19/22 1330   Dressing Change Due 05/20/22 05/19/22 1330   Number of days: 124            Altered Skin Integrity 01/20/22 1000 Right lateral Foot Purple or maroon localized area of discolored intact skin or non-intact skin or a blood-filled blister. (Active)   01/20/22 1000   Altered Skin Integrity Present on Admission: suspected hospital acquired   Side: Right   Orientation: lateral   Location: Foot   Wound Number:    Is this injury device related?:    Primary Wound Type:    Description of Altered Skin Integrity: Purple or maroon localized area of discolored intact skin or non-intact skin or a blood-filled blister.   Removal Indication and Assessment:    Wound Outcome:    (Retired) Wound Length (cm):    (Retired) Wound Width (cm):    (Retired) Depth (cm):    Wound Description (Comments):    Removal Indications:    Wound Image    05/17/22 1300   Description of Altered Skin Integrity Full thickness tissue loss with exposed bone, tendon, or muscle. Often includes undermining and tunneling. May extend into muscle and/or supporting structures. 05/17/22 1300   Dressing Appearance Dry 05/23/22 0715   Drainage Amount None 05/23/22 0715   Drainage Characteristics/Odor Serous;Tan;No odor 05/19/22 1330   Appearance Dressing in place, unable to visualize 05/23/22 0715   Tissue loss description Not applicable 05/19/22 1330   Black (%), Wound Tissue Color 0 % 05/17/22 1300   Red (%), Wound Tissue Color 20 % 05/17/22 1300   Yellow (%), Wound Tissue Color 80 % 05/17/22 1300   Periwound Area Moist 05/19/22 1330   Wound Edges Defined;Open 05/19/22 1330   Wound Length (cm) 3 cm 05/17/22 1300   Wound Width (cm) 4.5 cm 05/17/22 1300    Wound Depth (cm) 0.5 cm 05/17/22 1300   Wound Volume (cm^3) 6.75 cm^3 05/17/22 1300   Wound Surface Area (cm^2) 13.5 cm^2 05/17/22 1300   Care Cleansed with:;Antimicrobial agent;Soap and water 05/22/22 1400   Dressing Removed;Changed 05/22/22 1400   Periwound Care Moisture barrier applied 05/19/22 1330   Dressing Change Due 05/20/22 05/19/22 1330   Number of days: 124            Altered Skin Integrity 02/08/22 1300 Left posterior Heel Other (comment) Full thickness tissue loss. Base is covered by slough and/or eschar in the wound bed (Active)   02/08/22 1300   Altered Skin Integrity Present on Admission: yes   Side: Left   Orientation: posterior   Location: Heel   Wound Number:    Is this injury device related?: No   Primary Wound Type: Other   Description of Altered Skin Integrity: Full thickness tissue loss. Base is covered by slough and/or eschar in the wound bed   Removal Indication and Assessment:    Wound Outcome:    (Retired) Wound Length (cm):    (Retired) Wound Width (cm):    (Retired) Depth (cm):    Wound Description (Comments):    Removal Indications:    Wound Image     05/17/22 1300   Description of Altered Skin Integrity Full thickness tissue loss. Base is covered by slough and/or eschar in the wound bed 05/17/22 1300   Dressing Appearance Dry;Intact;Dried drainage 05/23/22 0715   Drainage Amount Scant 05/23/22 0715   Drainage Characteristics/Odor Serous 05/23/22 0715   Appearance Dressing in place, unable to visualize 05/23/22 2000   Tissue loss description Not applicable 05/20/22 1245   Black (%), Wound Tissue Color 0 % 05/17/22 1300   Red (%), Wound Tissue Color 50 % 05/17/22 1300   Yellow (%), Wound Tissue Color 50 % 05/17/22 1300   Periwound Area Moist 05/20/22 1245   Wound Edges Defined;Open 05/20/22 1245   Wound Length (cm) 7 cm 05/17/22 1300   Wound Width (cm) 6.5 cm 05/17/22 1300   Wound Depth (cm) 0.5 cm 05/17/22 1300   Wound Volume (cm^3) 22.75 cm^3 05/17/22 1300   Wound Surface Area (cm^2)  45.5 cm^2 05/17/22 1300   Care Cleansed with:;Soap and water;Antimicrobial agent 05/22/22 1400   Dressing Applied;Changed 05/22/22 1400   Periwound Care Moisture barrier applied 05/20/22 1245   Dressing Change Due 05/21/22 05/20/22 1245   Number of days: 105            Altered Skin Integrity 02/15/22 1300 Left lateral Leg Traumatic Partial thickness tissue loss. Shallow open ulcer with a red or pink wound bed, without slough. Intact or Open/Ruptured Serum-filled blister. (Active)   02/15/22 1300   Altered Skin Integrity Present on Admission: suspected hospital acquired   Side: Left   Orientation: lateral   Location: Leg   Wound Number:    Is this injury device related?: No   Primary Wound Type: Traumatic   Description of Altered Skin Integrity: Partial thickness tissue loss. Shallow open ulcer with a red or pink wound bed, without slough. Intact or Open/Ruptured Serum-filled blister.   Removal Indication and Assessment:    Wound Outcome:    (Retired) Wound Length (cm):    (Retired) Wound Width (cm):    (Retired) Depth (cm):    Wound Description (Comments):    Removal Indications:    Wound Image    05/12/22 1200   Description of Altered Skin Integrity Full thickness tissue loss. Base is covered by slough and/or eschar in the wound bed 04/26/22 1235   Dressing Appearance Open to air 05/23/22 0715   Drainage Amount None 05/23/22 0715   Appearance Dressing in place, unable to visualize 05/23/22 0715   Tissue loss description Not applicable 05/17/22 1300   Black (%), Wound Tissue Color 10 % 04/26/22 1235   Red (%), Wound Tissue Color 0 % 04/26/22 1235   Yellow (%), Wound Tissue Color 0 % 04/26/22 1235   Periwound Area Dry 05/17/22 1300   Wound Edges Defined 05/17/22 1300   Care Cleansed with:;Soap and water 05/22/22 1400   Dressing Changed 05/22/22 1400   Dressing Change Due 05/09/22 05/08/22 1200   Number of days: 98            Altered Skin Integrity 02/28/22 1300 Left anterior Foot Traumatic Partial thickness tissue  loss. Shallow open ulcer with a red or pink wound bed, without slough. Intact or Open/Ruptured Serum-filled blister. (Active)   02/28/22 1300   Altered Skin Integrity Present on Admission: suspected hospital acquired   Side: Left   Orientation: anterior   Location: Foot   Wound Number:    Is this injury device related?: No   Primary Wound Type: Traumatic   Description of Altered Skin Integrity: Partial thickness tissue loss. Shallow open ulcer with a red or pink wound bed, without slough. Intact or Open/Ruptured Serum-filled blister.   Removal Indication and Assessment:    Wound Outcome:    (Retired) Wound Length (cm):    (Retired) Wound Width (cm):    (Retired) Depth (cm):    Wound Description (Comments):    Removal Indications:    Wound Image    05/17/22 1300   Description of Altered Skin Integrity Partial thickness tissue loss. Shallow open ulcer with a red or pink wound bed, without slough. Intact or Open/Ruptured Serum-filled blister. 05/12/22 1200   Dressing Appearance Dry;Open to air 05/23/22 0715   Drainage Amount None 05/23/22 0715   Drainage Characteristics/Odor Serous 05/10/22 0742   Appearance Dry 05/23/22 0715   Tissue loss description Partial thickness 05/04/22 1300   Black (%), Wound Tissue Color 0 % 05/17/22 1300   Red (%), Wound Tissue Color 0 % 05/17/22 1300   Yellow (%), Wound Tissue Color 0 % 05/17/22 1300   Periwound Area Dry 05/17/22 1300   Wound Edges Defined 05/17/22 1300   Care Cleansed with:;Antimicrobial agent;Soap and water 05/22/22 1400   Dressing Changed 05/22/22 1400   Dressing Change Due 05/09/22 05/08/22 1200   Number of days: 85            Altered Skin Integrity 03/15/22 1000 Right lateral Malleolus Ulceration Purple or maroon localized area of discolored intact skin or non-intact skin or a blood-filled blister. (Active)   03/15/22 1000   Altered Skin Integrity Present on Admission: suspected hospital acquired   Side: Right   Orientation: lateral   Location: Malleolus   Wound  Number:    Is this injury device related?: Yes   Primary Wound Type: Ulceration   Description of Altered Skin Integrity: Purple or maroon localized area of discolored intact skin or non-intact skin or a blood-filled blister.   Removal Indication and Assessment:    Wound Outcome:    (Retired) Wound Length (cm):    (Retired) Wound Width (cm):    (Retired) Depth (cm):    Wound Description (Comments):    Removal Indications:    Wound Image    05/17/22 1300   Description of Altered Skin Integrity Full thickness tissue loss. Subcutaneous fat may be visible but bone, tendon or muscle are not exposed 05/17/22 1300   Dressing Appearance Moist drainage 05/19/22 1330   Drainage Amount Small 05/19/22 1330   Drainage Characteristics/Odor Serosanguineous;No odor 05/19/22 1330   Appearance Pink;Tan;Moist;Slough;Granulating 05/19/22 1330   Tissue loss description Full thickness 05/19/22 1330   Black (%), Wound Tissue Color 0 % 05/17/22 1300   Red (%), Wound Tissue Color 99 % 05/17/22 1300   Yellow (%), Wound Tissue Color 1 % 05/17/22 1300   Periwound Area Moist 05/19/22 1330   Wound Edges Defined;Open 05/19/22 1330   Wound Length (cm) 3.5 cm 05/17/22 1300   Wound Width (cm) 4 cm 05/17/22 1300   Wound Depth (cm) 0.2 cm 05/17/22 1300   Wound Volume (cm^3) 2.8 cm^3 05/17/22 1300   Wound Surface Area (cm^2) 14 cm^2 05/17/22 1300   Care Cleansed with:;Antimicrobial agent;Soap and water 05/22/22 1400   Dressing Changed 05/22/22 1400   Periwound Care Moisture barrier applied 05/19/22 1330   Dressing Change Due 05/20/22 05/19/22 1330   Number of days: 70            Altered Skin Integrity 03/15/22 1000 Right lateral Leg Ulceration Purple or maroon localized area of discolored intact skin or non-intact skin or a blood-filled blister. (Active)   03/15/22 1000   Altered Skin Integrity Present on Admission: suspected hospital acquired   Side: Right   Orientation: lateral   Location: Leg   Wound Number:    Is this injury device related?: No    Primary Wound Type: Ulceration   Description of Altered Skin Integrity: Purple or maroon localized area of discolored intact skin or non-intact skin or a blood-filled blister.   Removal Indication and Assessment:    Wound Outcome:    (Retired) Wound Length (cm):    (Retired) Wound Width (cm):    (Retired) Depth (cm):    Wound Description (Comments):    Removal Indications:    Wound Image    05/17/22 1300   Description of Altered Skin Integrity Full thickness tissue loss with exposed bone, tendon, or muscle. Often includes undermining and tunneling. May extend into muscle and/or supporting structures. 05/17/22 1300   Dressing Appearance Dry 05/23/22 0715   Drainage Amount None 05/23/22 0715   Drainage Characteristics/Odor Serosanguineous;No odor 05/19/22 1330   Appearance Dressing in place, unable to visualize 05/23/22 0715   Tissue loss description Full thickness 05/19/22 1330   Black (%), Wound Tissue Color 0 % 05/17/22 1300   Red (%), Wound Tissue Color 90 % 05/17/22 1300   Yellow (%), Wound Tissue Color 10 % 05/17/22 1300   Periwound Area Moist 05/19/22 1330   Wound Edges Defined;Open 05/19/22 1330   Wound Length (cm) 11 cm 05/17/22 1300   Wound Width (cm) 4 cm 05/17/22 1300   Wound Depth (cm) 0.5 cm 05/17/22 1300   Wound Volume (cm^3) 22 cm^3 05/17/22 1300   Wound Surface Area (cm^2) 44 cm^2 05/17/22 1300   Care Cleansed with:;Antimicrobial agent;Soap and water 05/22/22 1400   Dressing Removed;Applied;Changed 05/22/22 1400   Periwound Care Moisture barrier applied 05/19/22 1330   Dressing Change Due 05/20/22 05/19/22 1330   Number of days: 70            Altered Skin Integrity 03/22/22 1300 posterior Sacral spine Other (comment) Full thickness tissue loss. Base is covered by slough and/or eschar in the wound bed (Active)   03/22/22 1300   Altered Skin Integrity Present on Admission: yes   Side:    Orientation: posterior   Location: Sacral spine   Wound Number:    Is this injury device related?: No   Primary  Wound Type: Other   Description of Altered Skin Integrity: Full thickness tissue loss. Base is covered by slough and/or eschar in the wound bed   Removal Indication and Assessment:    Wound Outcome:    (Retired) Wound Length (cm):    (Retired) Wound Width (cm):    (Retired) Depth (cm):    Wound Description (Comments):    Removal Indications:    Wound Image    05/17/22 1300   Description of Altered Skin Integrity Full thickness tissue loss with exposed bone, tendon, or muscle. Often includes undermining and tunneling. May extend into muscle and/or supporting structures. 05/17/22 1300   Dressing Appearance Moist drainage 05/23/22 0715   Drainage Amount Small 05/23/22 0715   Drainage Characteristics/Odor Serous 05/23/22 0715   Appearance Dressing in place, unable to visualize 05/23/22 2000   Tissue loss description Full thickness 05/20/22 1245   Black (%), Wound Tissue Color 0 % 05/17/22 1300   Red (%), Wound Tissue Color 80 % 05/17/22 1300   Yellow (%), Wound Tissue Color 20 % 05/17/22 1300   Periwound Area Hemosiderin Staining;Moist 05/20/22 1245   Wound Edges Defined;Open 05/20/22 1245   Wound Length (cm) 14 cm 05/17/22 1300   Wound Width (cm) 13 cm 05/17/22 1300   Wound Depth (cm) 3.5 cm 05/17/22 1300   Wound Volume (cm^3) 637 cm^3 05/17/22 1300   Wound Surface Area (cm^2) 182 cm^2 05/17/22 1300   Care Cleansed with:;Antimicrobial agent;Wound cleanser 05/23/22 0000   Dressing Removed;Changed 05/23/22 0000   Packing packed with;strip gauze;other (see comment) 05/22/22 1400   Periwound Care Moisture barrier applied 05/20/22 1245   Dressing Change Due 05/21/22 05/20/22 1245   Number of days: 63            Altered Skin Integrity 04/05/22 1130 Right anterior Toe, second Traumatic (Active)   04/05/22 1130   Altered Skin Integrity Present on Admission: suspected hospital acquired   Side: Right   Orientation: anterior   Location: Toe, second   Wound Number:    Is this injury device related?: No   Primary Wound Type:  Traumatic   Description of Altered Skin Integrity:    Removal Indication and Assessment:    Wound Outcome:    (Retired) Wound Length (cm):    (Retired) Wound Width (cm):    (Retired) Depth (cm):    Wound Description (Comments):    Removal Indications:    Wound Image    05/17/22 1300   Dressing Appearance Dry;Intact 05/23/22 0715   Drainage Amount None 05/23/22 0715   Drainage Characteristics/Odor Creamy 04/17/22 0800   Appearance Dressing in place, unable to visualize 05/23/22 2000   Tissue loss description Not applicable 05/17/22 1300   Wound Edges Defined 05/17/22 1300   Care Cleansed with:;Soap and water 05/17/22 1300   Dressing Applied 05/15/22 1200   Number of days: 49            Altered Skin Integrity 04/07/22 1130 posterior Buttocks Incontinence associated dermatitis (Active)   04/07/22 1130   Altered Skin Integrity Present on Admission: suspected hospital acquired   Side:    Orientation: posterior   Location: Buttocks   Wound Number:    Is this injury device related?: No   Primary Wound Type: Incontinence   Description of Altered Skin Integrity:    Removal Indication and Assessment:    Wound Outcome:    (Retired) Wound Length (cm):    (Retired) Wound Width (cm):    (Retired) Depth (cm):    Wound Description (Comments):    Removal Indications:    Wound Image    05/17/22 1300   Description of Altered Skin Integrity Partial thickness tissue loss. Shallow open ulcer with a red or pink wound bed, without slough. Intact or Open/Ruptured Serum-filled blister. 05/03/22 1245   Dressing Appearance No dressing 05/23/22 0715   Drainage Amount None 05/23/22 0715   Drainage Characteristics/Odor No odor 05/17/22 1300   Appearance Dressing in place, unable to visualize 05/23/22 2000   Tissue loss description Partial thickness 05/12/22 1200   Black (%), Wound Tissue Color 0 % 05/17/22 1300   Red (%), Wound Tissue Color 100 % 05/17/22 1300   Yellow (%), Wound Tissue Color 0 % 05/17/22 1300   Periwound Area Dry;[place] 05/17/22  1300   Wound Edges Defined 05/17/22 1300   Care Wound cleanser;Cleansed with:;Moisturizing agent 05/23/22 0000   Dressing Removed;Applied 05/23/22 0000   Packing packed with;other (see comment) 05/23/22 0000   Periwound Care Moisture barrier applied 05/16/22 1300   Dressing Change Due 05/09/22 05/08/22 1200   Number of days: 47            Altered Skin Integrity 04/21/22 1200 Right medial Toe, fifth Ulceration (Active)   04/21/22 1200   Altered Skin Integrity Present on Admission: suspected hospital acquired   Side: Right   Orientation: medial   Location: Toe, fifth   Wound Number:    Is this injury device related?: No   Primary Wound Type: Ulceration   Description of Altered Skin Integrity:    Removal Indication and Assessment:    Wound Outcome:    (Retired) Wound Length (cm):    (Retired) Wound Width (cm):    (Retired) Depth (cm):    Wound Description (Comments):    Removal Indications:    Wound Image    05/17/22 1300   Description of Altered Skin Integrity Full thickness tissue loss. Subcutaneous fat may be visible but bone, tendon or muscle are not exposed 05/17/22 1300   Dressing Appearance Dry;Intact 05/19/22 1915   Drainage Amount Scant 05/19/22 1330   Drainage Characteristics/Odor Serous;Tan;No odor 05/19/22 1330   Appearance Chung;Moist 05/19/22 1330   Tissue loss description Not applicable 05/19/22 1330   Black (%), Wound Tissue Color 0 % 05/17/22 1300   Red (%), Wound Tissue Color 0 % 05/17/22 1300   Yellow (%), Wound Tissue Color 100 % 05/17/22 1300   Periwound Area Moist 05/19/22 1330   Wound Edges Defined 05/19/22 1330   Wound Length (cm) 0.6 cm 05/17/22 1300   Wound Width (cm) 0.6 cm 05/17/22 1300   Wound Depth (cm) 0.1 cm 05/17/22 1300   Wound Volume (cm^3) 0.036 cm^3 05/17/22 1300   Wound Surface Area (cm^2) 0.36 cm^2 05/17/22 1300   Care Cleansed with:;Soap and water;Wound cleanser;Skin Barrier;Moisturizing agent;Applied:;Removed: 05/19/22 1330   Dressing Removed;Applied;Changed;Collagen;Gauze  05/19/22 1330   Dressing Change Due 05/18/22 05/17/22 1300   Number of days: 33            Incision/Site 01/04/22 1444 Neck (Active)   01/04/22 1444   Present Prior to Hospital Arrival?: No   Side:    Location: Neck   Orientation:    Incision Type:    Closure Method:    Additional Comments:    Removal Indication and Assessment:    Wound Outcome:    Removal Indications:    Incision WDL WDL 05/23/22 0715   Dressing Appearance Open to air 05/23/22 0715   Drainage Amount None 05/23/22 0715   Number of days: 140            Incision/Site 05/20/22 1005 Right Foot (Active)   05/20/22 1005   Present Prior to Hospital Arrival?:    Side: Right   Location: Foot   Orientation:    Incision Type:    Closure Method:    Additional Comments:    Removal Indication and Assessment:    Wound Outcome:    Removal Indications:    Incision WDL WDL 05/23/22 0715   Dressing Appearance Dry;Intact 05/23/22 0715   Drainage Amount None 05/23/22 0715   Appearance Dressing in place, unable to visualize 05/23/22 2000   Care Cleansed with:;Soap and water;Antimicrobial agent;Applied: 05/22/22 1400   Dressing Changed 05/22/22 1400   Number of days: 4         Assessment and Plan      Osteomyelitis       ID following for antibiotics, pending pathology and bone culture reports  Ulcer of right lower extremity with necrosis of bone  Clean wound with vashe  Apply sensicare around wound  Apply santyl naz thick to wound bed, cover with vashe/saline moistened 4x4  Cover and secure with abd pad and paper tape  Change daily  Turn every two hours  Low air loss mattress  Keep pressure off wound  TAP system   Bedside debridements weekly and PRN  Pressure ulcer of sacral region, stage 4  Clean wound with vashe  Apply sensicare around wound  Apply santyl naz thick to wound bed, cover with vashe/saline moistened 4x4  Cover and secure with abd pad and paper tape  Change daily  Turn every two hours  Low air loss mattress  Keep pressure off wound  TAP system    Bedside debridements weekly and PRN            Signature:  HERSON Viveros  Wound Care    Date of encounter: 05/24/2022

## 2022-05-24 NOTE — ASSESSMENT & PLAN NOTE
No active bleeding  Hgb 6.0 on 4/11  Patient transfused 1 unit with dialysis yesterday  4/13 -H/H 7.5/22.7  4/15- hgb is 6.2 today. We are going to give another unit of PRBC's today with dialysis  4/16 post transfusion cbc is pending  4/18- hgb is 6.6. Will plan for transfusion with 1 unit of prbc's with next HD  4/21 - 7.2/21.6   4/25- 6.6/19.7 - will transfuse one unit PRBCs with next HD   4/28- Hgb now 8.0 following transfusion  5/2/2022 hemoglobin 7 no need for transfusion yet  5/3/2022 transfuse 1 unit packed red blood cells  05/05/2022 I believe this anemia is probably related to his chronic renal sufficiency a wonder if he is a candidate for Epogen at this point  5/10- transfused 1 unit yesterday  5/12 repeat H&H in am  05/13/2022 in 2 H&H pending  Most recent 7.4 will check iron levels and consider Epogen

## 2022-05-24 NOTE — PROGRESS NOTES
[facility]  Pulmonology  Progress Note    Patient Name: Og Garcia  MRN: [id number]  Admission Date: 12/23/2021  Hospital Length of Stay: 152 days  Code Status: Prior  Attending Provider: Cecil Abernathy DO  Primary Care Provider: Hector Arndt DNP, FNP-C   Principal Problem: Denice gangrene    Subjective:      Interval History:  Patient without any history seems alert    Objective:     Vital Signs (Most Recent):  Temp: (!) 100.9 °F (38.3 °C) (05/24/22 0300)  Pulse: 104 (05/24/22 0300)  Resp: (!) 42 (05/24/22 0300)  BP: (!) 91/50 (05/24/22 0300)  SpO2: 100 % (05/24/22 0300)   Vital Signs (24h Range):  Temp:  [98.1 °F (36.7 °C)-100.9 °F (38.3 °C)] 100.9 °F (38.3 °C)  Pulse:  [] 104  Resp:  [25-45] 42  SpO2:  [96 %-100 %] 100 %  BP: ()/(40-67) 91/50     Weight: 77.5 kg (170 lb 13.7 oz)  Body mass index is 23.83 kg/m².      Intake/Output Summary (Last 24 hours) at 5/24/2022 0519  Last data filed at 5/23/2022 1706  Gross per 24 hour   Intake 2425 ml   Output 2300 ml   Net 125 ml       Physical Exam  Vitals reviewed.   Constitutional:       Appearance: Normal appearance.      Interventions: He is not intubated.     Comments: Tracheostomy lack of and I have on the right side   HENT:      Head: Normocephalic and atraumatic.      Nose: Nose normal.      Mouth/Throat:      Mouth: Mucous membranes are dry.      Pharynx: Oropharynx is clear.   Eyes:      Extraocular Movements: Extraocular movements intact.      Conjunctiva/sclera: Conjunctivae normal.      Pupils: Pupils are equal, round, and reactive to light.   Cardiovascular:      Rate and Rhythm: Normal rate.      Heart sounds: Normal heart sounds. No murmur heard.  Pulmonary:      Effort: Pulmonary effort is normal. He is not intubated.      Breath sounds: Normal breath sounds.   Abdominal:      General: Abdomen is flat. Bowel sounds are normal.      Palpations: Abdomen is soft.   Musculoskeletal:         General: Normal  range of motion.      Cervical back: Normal range of motion and neck supple.      Right lower leg: No edema.      Left lower leg: No edema.   Skin:     General: Skin is warm and dry.      Capillary Refill: Capillary refill takes less than 2 seconds.   Neurological:      General: No focal deficit present.      Mental Status: He is alert and oriented to person, place, and time.   Psychiatric:         Mood and Affect: Mood normal.         Behavior: Behavior normal.       Vents:  Vent Mode: CPAP / PSV (05/22/22 0044)  Ventilator Initiated: No (04/23/22 1934)  Set Rate: 0 BPM (05/18/22 0516)  Vt Set: 0 mL (05/18/22 0516)  Pressure Support: 15 cmH20 (05/22/22 0044)  PEEP/CPAP: 5 cmH20 (05/22/22 0044)  Oxygen Concentration (%): 35 (05/24/22 0000)  Peak Airway Pressure: 21 cmH2O (05/22/22 0044)  Plateau Pressure: 20 cmH20 (03/07/22 0500)  Total Ve: 7.8 mL (05/22/22 0044)  F/VT Ratio<105 (RSBI): (!) 22.64 (05/22/22 0044)    Lines/Drains/Airways       Central Venous Catheter Line  Duration                  Hemodialysis Catheter 12/30/21 0900 right internal jugular 144 days              Drain  Duration                  Colostomy 12/18/21 1030 Descending/sigmoid  days         Gastrostomy/Enterostomy 03/09/22 1436 Percutaneous endoscopic gastrostomy (PEG) midline feeding 75 days              Airway  Duration                  Surgical Airway 04/27/22 0856 Shiley;Other (Comment) Cuffed;Long 26 days              Peripheral Intravenous Line  Duration                  Peripheral IV - Single Lumen 04/11/22 1623 18 G Anterior;Proximal;Right Forearm 42 days                    Significant Labs:    CBC/Anemia Profile:  Recent Labs   Lab 05/23/22  0636   WBC 16.58*   HGB 7.4*   HCT 23.7*   *   .9*   RDW 15.2*        Chemistries:  Recent Labs   Lab 05/23/22  0636   *   K 3.9   CL 97*   CO2 25   BUN 71*   CREATININE 2.48*   CALCIUM 9.0       Recent Lab Results  (Last 5 results in the past 24 hours)         05/24/22  0329   05/23/22  2132   05/23/22  1812   05/23/22  1300   05/23/22  0636        Anion Gap         16       Baso #         0.05       Basophil %         0.3       BUN         71       BUN/CREAT RATIO         29       Calcium         9.0       Chloride         97       CO2         25       Creatinine         2.48       Differential Type         Auto       eGFR if non          28       Eos #         0.58       Eosinophil %         3.5       Glucose         110       Hematocrit         23.7       Hemoglobin         7.4       Immature Grans (Abs)         0.53       Immature Granulocytes         3.2       Lymph #         3.32       Lymph %         20.0       MCH         31.5       MCHC         31.2       MCV         100.9       Mono #         1.76       Mono %         10.6       MPV         8.2       Neutrophils, Abs         10.34       Neutrophils Relative         62.4       nRBC         0.1       NUCLEATED RBC ABSOLUTE         0.02       Platelets         536       POC Glucose 118   129   129   106         Potassium         3.9       RBC         2.35       RDW         15.2       Sodium         134       WBC         16.58                              Significant Imaging:  I have reviewed all pertinent imaging results/findings within the past 24 hours.    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  Still with an elevated white  count and low-grade temp finished a course of Merrem re-culture for next temp spike    ESRD (end stage renal disease)  Started on HD 12/30   Continue with HD per nephrology      Type 2 diabetes mellitus without complication, without long-term current use of insulin  Blood sugar is well controlled    Acute blood loss anemia  No active bleeding  Hgb 6.0 on 4/11  Patient transfused 1 unit with dialysis yesterday  4/13 -H/H 7.5/22.7  4/15- hgb is 6.2 today. We are going to give another unit of PRBC's today with dialysis  4/16 post transfusion cbc is pending  4/18- hgb is 6.6. Will plan for transfusion with 1 unit of prbc's with next HD  4/21 - 7.2/21.6   4/25- 6.6/19.7 - will transfuse one unit PRBCs with next HD   4/28- Hgb now 8.0 following transfusion  5/2/2022 hemoglobin 7 no need for transfusion yet  5/3/2022 transfuse 1 unit packed red blood cells  05/05/2022 I believe this anemia is probably related to his chronic renal sufficiency a wonder if he is a candidate for Epogen at this point  5/10- transfused 1 unit yesterday  5/12 repeat H&H in am  05/13/2022 in 2 H&H pending  Most recent 7.4 will check iron levels and consider Epogen                 Jose Urban MD  Pulmonology  [facility]

## 2022-05-24 NOTE — ASSESSMENT & PLAN NOTE
Patient receiving Merrem for Klebsiella in his wound.  Still with an elevated white count and low-grade temp finished a course of Merrem re-culture for next temp spike

## 2022-05-24 NOTE — PLAN OF CARE
Problem: Adult Inpatient Plan of Care  Goal: Plan of Care Review  Outcome: Ongoing, Progressing  Goal: Patient-Specific Goal (Individualized)  Outcome: Ongoing, Progressing  Goal: Optimal Comfort and Wellbeing  Outcome: Ongoing, Progressing     Problem: Bleeding (Sepsis/Septic Shock)  Goal: Absence of Bleeding  Outcome: Ongoing, Progressing     Problem: Glycemic Control Impaired (Sepsis/Septic Shock)  Goal: Blood Glucose Level Within Desired Range  Outcome: Ongoing, Progressing     Problem: Infection Progression (Sepsis/Septic Shock)  Goal: Absence of Infection Signs and Symptoms  Outcome: Ongoing, Progressing     Problem: Nutrition Impaired (Sepsis/Septic Shock)  Goal: Optimal Nutrition Intake  Outcome: Ongoing, Progressing     Problem: Oral Intake Inadequate (Acute Kidney Injury/Impairment)  Goal: Optimal Nutrition Intake  Outcome: Ongoing, Progressing     Problem: Adult Inpatient Plan of Care  Goal: Readiness for Transition of Care  Outcome: Ongoing, Not Progressing     Problem: Renal Function Impairment (Acute Kidney Injury/Impairment)  Goal: Effective Renal Function  Outcome: Ongoing, Not Progressing

## 2022-05-24 NOTE — PROGRESS NOTES
[facility]  Nephrology  Progress Note    Patient Name: Og Garcia  MRN: [id number]  Admission Date: 12/23/2021  Hospital Length of Stay: 152 days  Attending Provider: Cecil Abernathy DO   Primary Care Physician: Hector Arndt DNP, FNP-C  Principal Problem:Denice gangrene    Subjective:     HPI: The patient is known from the previous nephrology consult at [facility].  He is no at Specialty and continues to need dialysis support.      Interval History: The gentleman is about the same.  His is on trach collar.  No other acute changes.  No shortness of breath.    Review of patient's allergies indicates:  No Known Allergies  Current Facility-Administered Medications   Medication Frequency    0.9%  NaCl infusion (for blood administration) Q24H PRN    0.9%  NaCl infusion PRN    acetaminophen tablet 500 mg Q6H PRN    albuterol nebulizer solution 2.5 mg Q4H PRN    albuterol-ipratropium 2.5 mg-0.5 mg/3 mL nebulizer solution 3 mL Q6H    bisacodyL EC tablet 10 mg Daily PRN    collagenase ointment Daily PRN    dextrose 50% injection 12.5 g PRN    diltiaZEM tablet 90 mg Q6H    glucagon (human recombinant) injection 1 mg PRN    guaiFENesin 100 mg/5 ml syrup 200 mg Q6H    heparin (porcine) injection 4,000 Units PRN    heparin (porcine) injection 5,000 Units Q8H    insulin aspart U-100 injection 1-10 Units Q6H    insulin detemir U-100 injection 25 Units QHS    meropenem (MERREM) 500 mg in sodium chloride 0.9 % 100 mL IVPB (MB+) Q24H    metoprolol tartrate (LOPRESSOR) split tablet 12.5 mg BID    morphine injection 3 mg Q3H PRN    pantoprazole suspension 40 mg Daily    predniSONE tablet 2.5 mg Daily    sevelamer carbonate pwpk 0.8 g TID WM    simethicone chewable tablet 80 mg TID PRN    sodium chloride 0.9% bolus 250 mL PRN    tobramycin - pharmacy to dose pharmacy to manage frequency       Objective:     Vital Signs (Most Recent):  Temp: (!) 100.9 °F (38.3 °C) (05/24/22  0300)  Pulse: (!) 117 (05/24/22 1201)  Resp: (!) 37 (05/24/22 1201)  BP: (!) 96/51 (05/24/22 1302)  SpO2: 100 % (05/24/22 1201)  O2 Device (Oxygen Therapy): Trach Collar (05/24/22 0750)   Vital Signs (24h Range):  Temp:  [99.8 °F (37.7 °C)-100.9 °F (38.3 °C)] 100.9 °F (38.3 °C)  Pulse:  [101-117] 117  Resp:  [29-45] 37  SpO2:  [96 %-100 %] 100 %  BP: ()/(42-58) 96/51     Weight: 74 kg (163 lb 2.3 oz) (05/24/22 0400)  Body mass index is 22.75 kg/m².  Body surface area is 1.93 meters squared.    I/O last 3 completed shifts:  In: 3790 [P.O.:960; I.V.:40; NG/GT:2790]  Out: 2400 [Other:2000; Stool:400]    Physical Exam  Vitals reviewed.   HENT:      Head: Normocephalic.      Mouth/Throat:      Mouth: Mucous membranes are moist.   Cardiovascular:      Rate and Rhythm: Regular rhythm.      Pulses: Normal pulses.   Pulmonary:      Effort: Pulmonary effort is normal.   Abdominal:      Palpations: Abdomen is soft.   Neurological:      Mental Status: He is alert.       Significant Labs:  BMP:   Recent Labs   Lab 05/23/22  0636   *   *   K 3.9   CL 97*   CO2 25   BUN 71*   CREATININE 2.48*   CALCIUM 9.0     CBC:   Recent Labs   Lab 05/23/22  0636   WBC 16.58*   RBC 2.35*   HGB 7.4*   HCT 23.7*   *   .9*   MCH 31.5*   MCHC 31.2*        Significant Imaging:  Labs: Reviewed    Assessment/Plan:     Chronic respiratory failure  Concurrently on trach collar  At this time, the patient is doing well with trach collar    ESRD (end stage renal disease)  5/23/2022Dialysis MWF  5/24/2022  Continue with dialysis as scheduled.        Type 2 diabetes mellitus without complication, without long-term current use of insulin  Continue current treatment  Chronic condition        Thank you for your consult. I will follow-up with patient. Please contact us if you have any additional questions.    Edwin Pryor Jr, MD  Nephrology  [facility]

## 2022-05-24 NOTE — SUBJECTIVE & OBJECTIVE
Interval History: The gentleman is about the same.  His is on trach collar.  No other acute changes.  No shortness of breath.    Review of patient's allergies indicates:  No Known Allergies  Current Facility-Administered Medications   Medication Frequency    0.9%  NaCl infusion (for blood administration) Q24H PRN    0.9%  NaCl infusion PRN    acetaminophen tablet 500 mg Q6H PRN    albuterol nebulizer solution 2.5 mg Q4H PRN    albuterol-ipratropium 2.5 mg-0.5 mg/3 mL nebulizer solution 3 mL Q6H    bisacodyL EC tablet 10 mg Daily PRN    collagenase ointment Daily PRN    dextrose 50% injection 12.5 g PRN    diltiaZEM tablet 90 mg Q6H    glucagon (human recombinant) injection 1 mg PRN    guaiFENesin 100 mg/5 ml syrup 200 mg Q6H    heparin (porcine) injection 4,000 Units PRN    heparin (porcine) injection 5,000 Units Q8H    insulin aspart U-100 injection 1-10 Units Q6H    insulin detemir U-100 injection 25 Units QHS    meropenem (MERREM) 500 mg in sodium chloride 0.9 % 100 mL IVPB (MB+) Q24H    metoprolol tartrate (LOPRESSOR) split tablet 12.5 mg BID    morphine injection 3 mg Q3H PRN    pantoprazole suspension 40 mg Daily    predniSONE tablet 2.5 mg Daily    sevelamer carbonate pwpk 0.8 g TID WM    simethicone chewable tablet 80 mg TID PRN    sodium chloride 0.9% bolus 250 mL PRN    tobramycin - pharmacy to dose pharmacy to manage frequency       Objective:     Vital Signs (Most Recent):  Temp: (!) 100.9 °F (38.3 °C) (05/24/22 0300)  Pulse: (!) 117 (05/24/22 1201)  Resp: (!) 37 (05/24/22 1201)  BP: (!) 96/51 (05/24/22 1302)  SpO2: 100 % (05/24/22 1201)  O2 Device (Oxygen Therapy): Trach Collar (05/24/22 0750)   Vital Signs (24h Range):  Temp:  [99.8 °F (37.7 °C)-100.9 °F (38.3 °C)] 100.9 °F (38.3 °C)  Pulse:  [101-117] 117  Resp:  [29-45] 37  SpO2:  [96 %-100 %] 100 %  BP: ()/(42-58) 96/51     Weight: 74 kg (163 lb 2.3 oz) (05/24/22 0400)  Body mass index is 22.75 kg/m².  Body surface area is 1.93 meters  squared.    I/O last 3 completed shifts:  In: 3790 [P.O.:960; I.V.:40; NG/GT:2790]  Out: 2400 [Other:2000; Stool:400]    Physical Exam  Vitals reviewed.   HENT:      Head: Normocephalic.      Mouth/Throat:      Mouth: Mucous membranes are moist.   Cardiovascular:      Rate and Rhythm: Regular rhythm.      Pulses: Normal pulses.   Pulmonary:      Effort: Pulmonary effort is normal.   Abdominal:      Palpations: Abdomen is soft.   Neurological:      Mental Status: He is alert.       Significant Labs:  BMP:   Recent Labs   Lab 05/23/22  0636   *   *   K 3.9   CL 97*   CO2 25   BUN 71*   CREATININE 2.48*   CALCIUM 9.0     CBC:   Recent Labs   Lab 05/23/22  0636   WBC 16.58*   RBC 2.35*   HGB 7.4*   HCT 23.7*   *   .9*   MCH 31.5*   MCHC 31.2*        Significant Imaging:  Labs: Reviewed

## 2022-05-25 NOTE — PROGRESS NOTES
[facility]  Pulmonology  Progress Note    Patient Name: gO Garcia  MRN: [id number]  Admission Date: 12/23/2021  Hospital Length of Stay: 153 days  Code Status: Prior  Attending Provider: Cecil Abernathy DO  Primary Care Provider: Hector Arndt DNP, FNP-C   Principal Problem: Denice gangrene    Subjective:     Interval History:  No history    Objective:     Vital Signs (Most Recent):  Temp: 99.8 °F (37.7 °C) (05/25/22 0300)  Pulse: (!) 122 (05/25/22 0500)  Resp: (!) 48 (05/25/22 0500)  BP: (!) 114/58 (05/25/22 0500)  SpO2: (!) 89 % (05/25/22 0500)   Vital Signs (24h Range):  Temp:  [98.1 °F (36.7 °C)-100.2 °F (37.9 °C)] 99.8 °F (37.7 °C)  Pulse:  [] 122  Resp:  [26-48] 48  SpO2:  [89 %-100 %] 89 %  BP: ()/(44-63) 114/58     Weight: 74.9 kg (165 lb 2 oz)  Body mass index is 23.03 kg/m².      Intake/Output Summary (Last 24 hours) at 5/25/2022 0520  Last data filed at 5/25/2022 0500  Gross per 24 hour   Intake 3590 ml   Output 500 ml   Net 3090 ml       Physical Exam  Vitals reviewed.   Constitutional:       Appearance: Normal appearance.      Interventions: He is not intubated.  HENT:      Head: Normocephalic and atraumatic.      Nose: Nose normal.      Mouth/Throat:      Mouth: Mucous membranes are dry.      Pharynx: Oropharynx is clear.   Eyes:      Extraocular Movements: Extraocular movements intact.      Conjunctiva/sclera: Conjunctivae normal.      Pupils: Pupils are equal, round, and reactive to light.   Cardiovascular:      Rate and Rhythm: Normal rate.      Heart sounds: Normal heart sounds. No murmur heard.  Pulmonary:      Effort: Pulmonary effort is normal. He is not intubated.      Breath sounds: Normal breath sounds.   Abdominal:      General: Abdomen is flat. Bowel sounds are normal.      Palpations: Abdomen is soft.   Musculoskeletal:         General: Normal range of motion.      Cervical back: Normal range of motion and neck supple.      Right lower  leg: No edema.      Left lower leg: No edema.   Skin:     General: Skin is warm and dry.      Capillary Refill: Capillary refill takes less than 2 seconds.   Neurological:      General: No focal deficit present.      Mental Status: He is alert and oriented to person, place, and time.   Psychiatric:         Mood and Affect: Mood normal.         Behavior: Behavior normal.       Vents:  Vent Mode: CPAP / PSV (05/22/22 0044)  Ventilator Initiated: No (04/23/22 1934)  Set Rate: 0 BPM (05/18/22 0516)  Vt Set: 0 mL (05/18/22 0516)  Pressure Support: 15 cmH20 (05/22/22 0044)  PEEP/CPAP: 5 cmH20 (05/22/22 0044)  Oxygen Concentration (%): 35 (05/25/22 0034)  Peak Airway Pressure: 21 cmH2O (05/22/22 0044)  Plateau Pressure: 20 cmH20 (03/07/22 0500)  Total Ve: 7.8 mL (05/22/22 0044)  F/VT Ratio<105 (RSBI): (!) 22.64 (05/22/22 0044)    Lines/Drains/Airways       Central Venous Catheter Line  Duration                  Hemodialysis Catheter 12/30/21 0900 right internal jugular 145 days              Drain  Duration                  Colostomy 12/18/21 1030 Descending/sigmoid  days         Gastrostomy/Enterostomy 03/09/22 1436 Percutaneous endoscopic gastrostomy (PEG) midline feeding 76 days              Airway  Duration                  Surgical Airway 04/27/22 0856 Shiley;Other (Comment) Cuffed;Long 27 days              Peripheral Intravenous Line  Duration                  Peripheral IV - Single Lumen 04/11/22 1623 18 G Anterior;Proximal;Right Forearm 43 days                    Significant Labs:    CBC/Anemia Profile:  Recent Labs   Lab 05/23/22  0636 05/24/22  1042   WBC 16.58*  --    HGB 7.4*  --    HCT 23.7*  --    *  --    .9*  --    RDW 15.2*  --    IRON  --  27*   FERRITIN  --  5,343*   FOLATE  --  18.5*   [id number]  --  1,234*        Chemistries:  Recent Labs   Lab 05/23/22  0636   *   K 3.9   CL 97*   CO2 25   BUN 71*   CREATININE 2.48*   CALCIUM 9.0       Recent Lab Results          05/24/22  2100   05/24/22  1807   05/24/22  1157   05/24/22  1042        Ferritin       5,343       Folate       18.5       Iron       27       Iron Saturation       21       POC Glucose 140   106   95         TIBC       128       Vitamin B-12       1,234               Significant Imaging:  I have reviewed all pertinent imaging results/findings within the past 24 hours.    Assessment/Plan:     * Denice gangrene  Stable with no acute issues          Chronic respiratory failure  I am not sure we need downsized this patient's trach I do not believe will ever have his trach removed mental status is stable need start looking for nursing home placement    Hypertension  BP is stable  Currently on rate control medications (cardizem 90 mg q6 and metoprolol 12.5 bid)        Tachycardia  Treated anemia   - concern this may be releated to pain and pain medication withdraw - his fentanyl patch was discontinued over the weekend which he has been on for weeks.  -plan - will start PO pain medication q8hrs then decrease to q12 hours after 7 days.   4/8 - improving   4/9-heart rate up on exam.  Secondary to pain.  Will treat p.r.n. pain medication.  4/14- had some SVT yesterday that improved with treating his pain and lopressor  4/22- HR in 110's on ditalizem - will add low dose BB   4/25- HR improved - BB was held due to HD this morning   4/28- HR around 100 today  05/25/2022 patient will more tachycardic this morning give her morning medicines and watch      Pressure ulcer of sacral region, stage 4  Wound care is following the patient  Their help reviewed and appreciated    Fever  Patient has finished his Merrem low-grade temp will watch    ESRD (end stage renal disease)  Started on HD 12/30   Continue with HD per nephrology                   Jose Urban MD  Pulmonology  [facility]

## 2022-05-25 NOTE — PLAN OF CARE
Plans of care ongoing  Problem: Adult Inpatient Plan of Care  Goal: Plan of Care Review  Outcome: Ongoing, Progressing  Goal: Patient-Specific Goal (Individualized)  Outcome: Ongoing, Progressing  Goal: Absence of Hospital-Acquired Illness or Injury  Outcome: Ongoing, Progressing  Goal: Optimal Comfort and Wellbeing  Outcome: Ongoing, Progressing  Goal: Readiness for Transition of Care  Outcome: Ongoing, Progressing     Problem: Adjustment to Illness (Sepsis/Septic Shock)  Goal: Optimal Coping  Outcome: Ongoing, Progressing     Problem: Bleeding (Sepsis/Septic Shock)  Goal: Absence of Bleeding  Outcome: Ongoing, Progressing     Problem: Glycemic Control Impaired (Sepsis/Septic Shock)  Goal: Blood Glucose Level Within Desired Range  Outcome: Ongoing, Progressing     Problem: Infection Progression (Sepsis/Septic Shock)  Goal: Absence of Infection Signs and Symptoms  Outcome: Ongoing, Progressing     Problem: Nutrition Impaired (Sepsis/Septic Shock)  Goal: Optimal Nutrition Intake  Outcome: Ongoing, Progressing     Problem: Fluid and Electrolyte Imbalance (Acute Kidney Injury/Impairment)  Goal: Fluid and Electrolyte Balance  Outcome: Ongoing, Progressing     Problem: Oral Intake Inadequate (Acute Kidney Injury/Impairment)  Goal: Optimal Nutrition Intake  Outcome: Ongoing, Progressing     Problem: Renal Function Impairment (Acute Kidney Injury/Impairment)  Goal: Effective Renal Function  Outcome: Ongoing, Progressing     Problem: Infection  Goal: Absence of Infection Signs and Symptoms  Outcome: Ongoing, Progressing     Problem: Fall Injury Risk  Goal: Absence of Fall and Fall-Related Injury  Outcome: Ongoing, Progressing     Problem: Communication Impairment (Mechanical Ventilation, Invasive)  Goal: Effective Communication  Outcome: Ongoing, Progressing     Problem: Device-Related Complication Risk (Mechanical Ventilation, Invasive)  Goal: Optimal Device Function  Outcome: Ongoing, Progressing     Problem: Inability to  Wean (Mechanical Ventilation, Invasive)  Goal: Mechanical Ventilation Liberation  Outcome: Ongoing, Progressing     Problem: Nutrition Impairment (Mechanical Ventilation, Invasive)  Goal: Optimal Nutrition Delivery  Outcome: Ongoing, Progressing     Problem: Skin and Tissue Injury (Mechanical Ventilation, Invasive)  Goal: Absence of Device-Related Skin and Tissue Injury  Outcome: Ongoing, Progressing     Problem: Ventilator-Induced Lung Injury (Mechanical Ventilation, Invasive)  Goal: Absence of Ventilator-Induced Lung Injury  Outcome: Ongoing, Progressing     Problem: Communication Impairment (Artificial Airway)  Goal: Effective Communication  Outcome: Ongoing, Progressing     Problem: Device-Related Complication Risk (Artificial Airway)  Goal: Optimal Device Function  Outcome: Ongoing, Progressing     Problem: Skin and Tissue Injury (Artificial Airway)  Goal: Absence of Device-Related Skin or Tissue Injury  Outcome: Ongoing, Progressing     Problem: Skin Injury Risk Increased  Goal: Skin Health and Integrity  Outcome: Ongoing, Progressing     Problem: Device-Related Complication Risk (Hemodialysis)  Goal: Safe, Effective Therapy Delivery  Outcome: Ongoing, Progressing     Problem: Hemodynamic Instability (Hemodialysis)  Goal: Effective Tissue Perfusion  Outcome: Ongoing, Progressing     Problem: Infection (Hemodialysis)  Goal: Absence of Infection Signs and Symptoms  Outcome: Ongoing, Progressing     Problem: Diabetes Comorbidity  Goal: Blood Glucose Level Within Targeted Range  Outcome: Ongoing, Progressing     Problem: Impaired Wound Healing  Goal: Optimal Wound Healing  Outcome: Ongoing, Progressing     Problem: Gas Exchange Impaired  Goal: Optimal Gas Exchange  Outcome: Ongoing, Progressing     Problem: Breathing Pattern Ineffective  Goal: Effective Breathing Pattern  Outcome: Ongoing, Progressing

## 2022-05-25 NOTE — SUBJECTIVE & OBJECTIVE
Interval History: The patient is on broad spectrum antibiotics. Fever earlier today.    Review of patient's allergies indicates:  No Known Allergies  Current Facility-Administered Medications   Medication Frequency    0.9%  NaCl infusion (for blood administration) Q24H PRN    0.9%  NaCl infusion PRN    acetaminophen tablet 500 mg Q6H PRN    albuterol nebulizer solution 2.5 mg Q4H PRN    albuterol-ipratropium 2.5 mg-0.5 mg/3 mL nebulizer solution 3 mL Q6H    bisacodyL EC tablet 10 mg Daily PRN    collagenase ointment Daily PRN    dextrose 50% injection 12.5 g PRN    diltiaZEM tablet 90 mg Q6H    glucagon (human recombinant) injection 1 mg PRN    guaiFENesin 100 mg/5 ml syrup 200 mg Q6H    heparin (porcine) injection 4,000 Units PRN    heparin (porcine) injection 5,000 Units Q8H    insulin aspart U-100 injection 1-10 Units Q6H    insulin detemir U-100 injection 25 Units QHS    linezolid 600 mg/300 mL IVPB 600 mg Q12H    meropenem (MERREM) 500 mg in sodium chloride 0.9 % 100 mL IVPB (MB+) Q24H    metoprolol tartrate (LOPRESSOR) split tablet 12.5 mg BID    pantoprazole suspension 40 mg Daily    predniSONE tablet 2.5 mg Daily    sevelamer carbonate pwpk 0.8 g TID WM    simethicone chewable tablet 80 mg TID PRN    sodium chloride 0.9% bolus 250 mL PRN    tobramycin (NEBCIN) 220 mg in dextrose 5 % IVPB Once    tobramycin - pharmacy to dose pharmacy to manage frequency       Objective:     Vital Signs (Most Recent):  Temp: (!) 102 °F (38.9 °C) (05/25/22 0745)  Pulse: 104 (05/25/22 1232)  Resp: (!) 26 (05/25/22 1232)  BP: (!) 111/58 (05/25/22 1110)  SpO2: 100 % (05/25/22 1232)  O2 Device (Oxygen Therapy): Trach Collar (05/25/22 0034)   Vital Signs (24h Range):  Temp:  [99 °F (37.2 °C)-102 °F (38.9 °C)] 102 °F (38.9 °C)  Pulse:  [] 104  Resp:  [24-48] 26  SpO2:  [89 %-100 %] 100 %  BP: ()/(44-63) 111/58     Weight: 74.9 kg (165 lb 2 oz) (05/25/22 0400)  Body mass index is 23.03 kg/m².  Body surface area is 1.94  meters squared.    I/O last 3 completed shifts:  In: 3590 [P.O.:480; I.V.:20; NG/GT:3090]  Out: 600 [Stool:600]    Physical Exam  Vitals reviewed.   HENT:      Head: Normocephalic.   Cardiovascular:      Rate and Rhythm: Regular rhythm.   Pulmonary:      Effort: Pulmonary effort is normal.   Abdominal:      Palpations: Abdomen is soft.   Neurological:      Mental Status: He is alert.       Significant Labs:  BMP:   Recent Labs   Lab 05/23/22  0636   *   *   K 3.9   CL 97*   CO2 25   BUN 71*   CREATININE 2.48*   CALCIUM 9.0     CBC:   Recent Labs   Lab 05/23/22  0636   WBC 16.58*   RBC 2.35*   HGB 7.4*   HCT 23.7*   *   .9*   MCH 31.5*   MCHC 31.2*        Significant Imaging:  Labs: Reviewed

## 2022-05-25 NOTE — DIALYSIS ROUNDING
Patient is seen on hemodialysis, they are tolerating this well, we will continue their treatment unchanged.

## 2022-05-25 NOTE — PROGRESS NOTES
[facility]  Nephrology  Progress Note    Patient Name: Og Garcia  MRN: [id number]  Admission Date: 12/23/2021  Hospital Length of Stay: 153 days  Attending Provider: Cecil Abernathy DO   Primary Care Physician: Hector Arndt DNP, FNP-C  Principal Problem:Denice gangrene    Subjective:     HPI: The patient is known from the previous nephrology consult at [facility].  He is no at Special[facility] and continues to need dialysis support.      Interval History: The patient is on broad spectrum antibiotics. Fever earlier today.    Review of patient's allergies indicates:  No Known Allergies  Current Facility-Administered Medications   Medication Frequency    0.9%  NaCl infusion (for blood administration) Q24H PRN    0.9%  NaCl infusion PRN    acetaminophen tablet 500 mg Q6H PRN    albuterol nebulizer solution 2.5 mg Q4H PRN    albuterol-ipratropium 2.5 mg-0.5 mg/3 mL nebulizer solution 3 mL Q6H    bisacodyL EC tablet 10 mg Daily PRN    collagenase ointment Daily PRN    dextrose 50% injection 12.5 g PRN    diltiaZEM tablet 90 mg Q6H    glucagon (human recombinant) injection 1 mg PRN    guaiFENesin 100 mg/5 ml syrup 200 mg Q6H    heparin (porcine) injection 4,000 Units PRN    heparin (porcine) injection 5,000 Units Q8H    insulin aspart U-100 injection 1-10 Units Q6H    insulin detemir U-100 injection 25 Units QHS    linezolid 600 mg/300 mL IVPB 600 mg Q12H    meropenem (MERREM) 500 mg in sodium chloride 0.9 % 100 mL IVPB (MB+) Q24H    metoprolol tartrate (LOPRESSOR) split tablet 12.5 mg BID    pantoprazole suspension 40 mg Daily    predniSONE tablet 2.5 mg Daily    sevelamer carbonate pwpk 0.8 g TID WM    simethicone chewable tablet 80 mg TID PRN    sodium chloride 0.9% bolus 250 mL PRN    tobramycin (NEBCIN) 220 mg in dextrose 5 % IVPB Once    tobramycin - pharmacy to dose pharmacy to manage frequency       Objective:     Vital Signs (Most Recent):  Temp: (!) 102 °F (38.9  °C) (05/25/22 0745)  Pulse: 104 (05/25/22 1232)  Resp: (!) 26 (05/25/22 1232)  BP: (!) 111/58 (05/25/22 1110)  SpO2: 100 % (05/25/22 1232)  O2 Device (Oxygen Therapy): Trach Collar (05/25/22 0034)   Vital Signs (24h Range):  Temp:  [99 °F (37.2 °C)-102 °F (38.9 °C)] 102 °F (38.9 °C)  Pulse:  [] 104  Resp:  [24-48] 26  SpO2:  [89 %-100 %] 100 %  BP: ()/(44-63) 111/58     Weight: 74.9 kg (165 lb 2 oz) (05/25/22 0400)  Body mass index is 23.03 kg/m².  Body surface area is 1.94 meters squared.    I/O last 3 completed shifts:  In: 3590 [P.O.:480; I.V.:20; NG/GT:3090]  Out: 600 [Stool:600]    Physical Exam  Vitals reviewed.   HENT:      Head: Normocephalic.   Cardiovascular:      Rate and Rhythm: Regular rhythm.   Pulmonary:      Effort: Pulmonary effort is normal.   Abdominal:      Palpations: Abdomen is soft.   Neurological:      Mental Status: He is alert.       Significant Labs:  BMP:   Recent Labs   Lab 05/23/22  0636   *   *   K 3.9   CL 97*   CO2 25   BUN 71*   CREATININE 2.48*   CALCIUM 9.0     CBC:   Recent Labs   Lab 05/23/22  0636   WBC 16.58*   RBC 2.35*   HGB 7.4*   HCT 23.7*   *   .9*   MCH 31.5*   MCHC 31.2*        Significant Imaging:  Labs: Reviewed    Assessment/Plan:     ESRD (end stage renal disease)  5/23/2022Dialysis MWF  5/24/2022  Continue with dialysis as scheduled.  5/25/2022  Dialysis session is going well.  Continue current support.          Debility  Anemia  On trach collar.    Thank you for your consult. I will follow-up with patient. Please contact us if you have any additional questions.    Edwin Pryor Jr, MD  Nephrology  [facility]

## 2022-05-25 NOTE — PROGRESS NOTES
Pharmacokinetic Follow Up: Tobramycin    Assessment of levels:   Random of 0.6 is within the desired range of </= 2    Regimen Plan:   Tobra 220 mg IV x 1 with a random tobra level ordered for 5/30 at 0600    Drug levels (last 3 results):  No results for input(s): AMIKACINPEAK, AMIKACINTROU, AMIKACINRAND, AMIKACIN in the last 72 hours.    No results for input(s): GENTAMICIN, GENTPEAK, GENTTROUGH, GENT10, GENT12, GENT8, GENTRANDOM in the last 72 hours.    Recent Labs   Lab Result Units 05/25/22  0327   Tobramycin, Random µg/mL 0.6       Pharmacy will continue to monitor.    Please contact pharmacy at extension [phone] with any questions regarding this assessment.    Patient brief summary:  Og Garcia is a 67 y.o. male initiated on aminoglycoside therapy for treatment of bone/joint infection    Drug Allergies:   Review of patient's allergies indicates:  No Known Allergies    Actual Body Weight:   74.9 kg    Adjust Body Weight:   74.9 kg    Ideal Body Weight:  75.3 kg    Renal Function:   Estimated Creatinine Clearance: 30.6 mL/min (A) (based on SCr of 2.48 mg/dL (H)).,     Dialysis Method (if applicable):  intermittent HD    CBC (last 72 hours):  Recent Labs   Lab Result Units 05/23/22  0636   WBC K/uL 16.58*   Hemoglobin g/dL 7.4*   Hematocrit % 23.7*   Platelet Count K/uL 536*   Lymphocytes % % 20.0*   Monocytes % % 10.6*   Eosinophils % % 3.5   Basophils % % 0.3       Metabolic Panel (last 72 hours):  Recent Labs   Lab Result Units 05/23/22  0636   Sodium mmol/L 134*   Potassium mmol/L 3.9   Chloride mmol/L 97*   CO2 mmol/L 25   Glucose mg/dL 110*   BUN mg/dL 71*   Creatinine mg/dL 2.48*       Aminoglycoside Administrations:  aminoglycosides given in last 96 hours        No antibiotic orders with administrations found.                    Microbiologic Results:  Microbiology Results (last 7 days)       Procedure Component Value Units Date/Time    Blood culture [[id number]] Collected: 05/19/22 2059    Order Status:  Completed Specimen: Blood Updated: 05/25/22 0801     Culture, Blood No Growth at 5 days    Blood culture [[id number]] Collected: 05/19/22 2059    Order Status: Completed Specimen: Blood Updated: 05/25/22 0801     Culture, Blood No Growth at 5 days    Blood culture (site 2) [[id number]] Collected: 05/24/22 1042    Order Status: Sent Specimen: Blood Updated: 05/24/22 1051    Blood culture (site 1) [[id number]] Collected: 05/24/22 1042    Order Status: Sent Specimen: Blood Updated: 05/24/22 1051    Culture, Bone [[id number]] Collected: 05/19/22 1630    Order Status: Completed Specimen: Bone from Toe Updated: 05/21/22 1058     Culture, Bone No Growth To Date

## 2022-05-25 NOTE — ASSESSMENT & PLAN NOTE
5/23/2022Dialysis MWF  5/24/2022  Continue with dialysis as scheduled.  5/25/2022  Dialysis session is going well.  Continue current support.

## 2022-05-25 NOTE — SUBJECTIVE & OBJECTIVE
Interval History:  No history    Objective:     Vital Signs (Most Recent):  Temp: 99.8 °F (37.7 °C) (05/25/22 0300)  Pulse: (!) 122 (05/25/22 0500)  Resp: (!) 48 (05/25/22 0500)  BP: (!) 114/58 (05/25/22 0500)  SpO2: (!) 89 % (05/25/22 0500)   Vital Signs (24h Range):  Temp:  [98.1 °F (36.7 °C)-100.2 °F (37.9 °C)] 99.8 °F (37.7 °C)  Pulse:  [] 122  Resp:  [26-48] 48  SpO2:  [89 %-100 %] 89 %  BP: ()/(44-63) 114/58     Weight: 74.9 kg (165 lb 2 oz)  Body mass index is 23.03 kg/m².      Intake/Output Summary (Last 24 hours) at 5/25/2022 0520  Last data filed at 5/25/2022 0500  Gross per 24 hour   Intake 3590 ml   Output 500 ml   Net 3090 ml       Physical Exam  Vitals reviewed.   Constitutional:       Appearance: Normal appearance.      Interventions: He is not intubated.  HENT:      Head: Normocephalic and atraumatic.      Nose: Nose normal.      Mouth/Throat:      Mouth: Mucous membranes are dry.      Pharynx: Oropharynx is clear.   Eyes:      Extraocular Movements: Extraocular movements intact.      Conjunctiva/sclera: Conjunctivae normal.      Pupils: Pupils are equal, round, and reactive to light.   Cardiovascular:      Rate and Rhythm: Normal rate.      Heart sounds: Normal heart sounds. No murmur heard.  Pulmonary:      Effort: Pulmonary effort is normal. He is not intubated.      Breath sounds: Normal breath sounds.   Abdominal:      General: Abdomen is flat. Bowel sounds are normal.      Palpations: Abdomen is soft.   Musculoskeletal:         General: Normal range of motion.      Cervical back: Normal range of motion and neck supple.      Right lower leg: No edema.      Left lower leg: No edema.   Skin:     General: Skin is warm and dry.      Capillary Refill: Capillary refill takes less than 2 seconds.   Neurological:      General: No focal deficit present.      Mental Status: He is alert and oriented to person, place, and time.   Psychiatric:         Mood and Affect: Mood normal.          Behavior: Behavior normal.       Vents:  Vent Mode: CPAP / PSV (05/22/22 0044)  Ventilator Initiated: No (04/23/22 1934)  Set Rate: 0 BPM (05/18/22 0516)  Vt Set: 0 mL (05/18/22 0516)  Pressure Support: 15 cmH20 (05/22/22 0044)  PEEP/CPAP: 5 cmH20 (05/22/22 0044)  Oxygen Concentration (%): 35 (05/25/22 0034)  Peak Airway Pressure: 21 cmH2O (05/22/22 0044)  Plateau Pressure: 20 cmH20 (03/07/22 0500)  Total Ve: 7.8 mL (05/22/22 0044)  F/VT Ratio<105 (RSBI): (!) 22.64 (05/22/22 0044)    Lines/Drains/Airways       Central Venous Catheter Line  Duration                  Hemodialysis Catheter 12/30/21 0900 right internal jugular 145 days              Drain  Duration                  Colostomy 12/18/21 1030 Descending/sigmoid  days         Gastrostomy/Enterostomy 03/09/22 1436 Percutaneous endoscopic gastrostomy (PEG) midline feeding 76 days              Airway  Duration                  Surgical Airway 04/27/22 0856 Shiley;Other (Comment) Cuffed;Long 27 days              Peripheral Intravenous Line  Duration                  Peripheral IV - Single Lumen 04/11/22 1623 18 G Anterior;Proximal;Right Forearm 43 days                    Significant Labs:    CBC/Anemia Profile:  Recent Labs   Lab 05/23/22  0636 05/24/22  1042   WBC 16.58*  --    HGB 7.4*  --    HCT 23.7*  --    *  --    .9*  --    RDW 15.2*  --    IRON  --  27*   FERRITIN  --  5,343*   FOLATE  --  18.5*   [id number]  --  1,234*        Chemistries:  Recent Labs   Lab 05/23/22  0636   *   K 3.9   CL 97*   CO2 25   BUN 71*   CREATININE 2.48*   CALCIUM 9.0       Recent Lab Results         05/24/22  2100   05/24/22  1807   05/24/22  1157   05/24/22  1042        Ferritin       5,343       Folate       18.5       Iron       27       Iron Saturation       21       POC Glucose 140   106   95         TIBC       128       Vitamin B-12       1,234               Significant Imaging:  I have reviewed all pertinent imaging results/findings within the  past 24 hours.

## 2022-05-25 NOTE — ASSESSMENT & PLAN NOTE
I am not sure we need downsized this patient's trach I do not believe will ever have his trach removed mental status is stable need start looking for nursing home placement

## 2022-05-25 NOTE — ASSESSMENT & PLAN NOTE
Treated anemia   - concern this may be releated to pain and pain medication withdraw - his fentanyl patch was discontinued over the weekend which he has been on for weeks.  -plan - will start PO pain medication q8hrs then decrease to q12 hours after 7 days.   4/8 - improving   4/9-heart rate up on exam.  Secondary to pain.  Will treat p.r.n. pain medication.  4/14- had some SVT yesterday that improved with treating his pain and lopressor  4/22- HR in 110's on ditalizem - will add low dose BB   4/25- HR improved - BB was held due to HD this morning   4/28- HR around 100 today  05/25/2022 patient will more tachycardic this morning give her morning medicines and watch

## 2022-05-26 NOTE — NURSING
22g int started in left hand by DENIES CRUZ RN..TOBRAMYCIN 220 MG IV  GIVEN BY NOBLE MONTALVO RN AT THIS TIME..( NOTE SENT TO PHARMACY TO INFORM OF TIME PT DID RECEIVE IV TOBRAMYCIN

## 2022-05-26 NOTE — PROGRESS NOTES
Wound care note;  Patient skin and dressing change performed today  Applied NPWT to sacral , suctioning at 125 mm Hg. Wound with pink slow granular tissue, scattered areas of tan slough.

## 2022-05-26 NOTE — PLAN OF CARE
Problem: Adult Inpatient Plan of Care  Goal: Optimal Comfort and Wellbeing  Outcome: Ongoing, Progressing     Problem: Glycemic Control Impaired (Sepsis/Septic Shock)  Goal: Blood Glucose Level Within Desired Range  Outcome: Ongoing, Progressing     Problem: Nutrition Impaired (Sepsis/Septic Shock)  Goal: Optimal Nutrition Intake  Outcome: Ongoing, Progressing     Problem: Oral Intake Inadequate (Acute Kidney Injury/Impairment)  Goal: Optimal Nutrition Intake  Outcome: Ongoing, Progressing     Problem: Adult Inpatient Plan of Care  Goal: Plan of Care Review  Outcome: Ongoing, Not Progressing  Goal: Absence of Hospital-Acquired Illness or Injury  Outcome: Ongoing, Not Progressing  Goal: Readiness for Transition of Care  Outcome: Ongoing, Not Progressing     Problem: Infection Progression (Sepsis/Septic Shock)  Goal: Absence of Infection Signs and Symptoms  Outcome: Ongoing, Not Progressing     Problem: Fluid and Electrolyte Imbalance (Acute Kidney Injury/Impairment)  Goal: Fluid and Electrolyte Balance  Outcome: Ongoing, Not Progressing

## 2022-05-26 NOTE — NURSING
Unable to give scheduled dose of tobramycin at this time due to unable to flush int  Catheter ..  Attempts x 2 to start another int .. unsuccessful..

## 2022-05-26 NOTE — SUBJECTIVE & OBJECTIVE
Interval History:  No history    Objective:     Vital Signs (Most Recent):  Temp: 98.8 °F (37.1 °C) (05/26/22 0400)  Pulse: 90 (05/26/22 0600)  Resp: (!) 29 (05/26/22 0600)  BP: (!) 80/40 (05/26/22 0600)  SpO2: 100 % (05/26/22 0600)   Vital Signs (24h Range):  Temp:  [98.3 °F (36.8 °C)-102.4 °F (39.1 °C)] 98.8 °F (37.1 °C)  Pulse:  [] 90  Resp:  [20-44] 29  SpO2:  [84 %-100 %] 100 %  BP: ()/(22-62) 80/40     Weight: 74.7 kg (164 lb 10.9 oz)  Body mass index is 22.97 kg/m².      Intake/Output Summary (Last 24 hours) at 5/26/2022 0648  Last data filed at 5/26/2022 0600  Gross per 24 hour   Intake 1750 ml   Output 1800 ml   Net -50 ml       Physical Exam  Vitals reviewed.   Constitutional:       Appearance: Normal appearance.      Interventions: He is not intubated.  HENT:      Head: Normocephalic and atraumatic.      Nose: Nose normal.      Mouth/Throat:      Mouth: Mucous membranes are dry.      Pharynx: Oropharynx is clear.   Eyes:      Extraocular Movements: Extraocular movements intact.      Conjunctiva/sclera: Conjunctivae normal.      Pupils: Pupils are equal, round, and reactive to light.   Cardiovascular:      Rate and Rhythm: Normal rate.      Heart sounds: Normal heart sounds. No murmur heard.  Pulmonary:      Effort: Pulmonary effort is normal. He is not intubated.      Breath sounds: Normal breath sounds.   Abdominal:      General: Abdomen is flat. Bowel sounds are normal.      Palpations: Abdomen is soft.   Musculoskeletal:         General: Normal range of motion.      Cervical back: Normal range of motion and neck supple.      Right lower leg: No edema.      Left lower leg: No edema.   Skin:     General: Skin is warm and dry.      Capillary Refill: Capillary refill takes less than 2 seconds.   Neurological:      General: No focal deficit present.      Mental Status: He is alert and oriented to person, place, and time.   Psychiatric:         Mood and Affect: Mood normal.         Behavior:  Behavior normal.       Vents:  Vent Mode: CPAP / PSV (05/22/22 0044)  Ventilator Initiated: No (04/23/22 1934)  Set Rate: 0 BPM (05/18/22 0516)  Vt Set: 0 mL (05/18/22 0516)  Pressure Support: 15 cmH20 (05/22/22 0044)  PEEP/CPAP: 5 cmH20 (05/22/22 0044)  Oxygen Concentration (%): 35 (05/26/22 0600)  Peak Airway Pressure: 21 cmH2O (05/22/22 0044)  Plateau Pressure: 20 cmH20 (03/07/22 0500)  Total Ve: 7.8 mL (05/22/22 0044)  F/VT Ratio<105 (RSBI): (!) 22.64 (05/22/22 0044)    Lines/Drains/Airways       Central Venous Catheter Line  Duration                  Hemodialysis Catheter 12/30/21 0900 right internal jugular 146 days              Drain  Duration                  Colostomy 12/18/21 1030 Descending/sigmoid  days         Gastrostomy/Enterostomy 03/09/22 1436 Percutaneous endoscopic gastrostomy (PEG) midline feeding 77 days              Airway  Duration                  Surgical Airway 04/27/22 0856 Shiley;Other (Comment) Cuffed;Long 28 days              Peripheral Intravenous Line  Duration                  Peripheral IV - Single Lumen 05/26/22 0210 22 G Left;Posterior Hand <1 day                    Significant Labs:    CBC/Anemia Profile:  Recent Labs   Lab 05/24/22  1042   IRON 27*   FERRITIN 5,343*   FOLATE 18.5*   [id number] 1,234*        Chemistries:  No results for input(s): NA, K, CL, CO2, BUN, CREATININE, CALCIUM, ALBUMIN, PROT, BILITOT, ALKPHOS, ALT, AST, GLUCOSE, MG, PHOS in the last 48 hours.    Recent Lab Results  (Last 5 results in the past 24 hours)        05/26/22  0507   05/26/22  0038   05/25/22  1810   05/25/22  1349   05/25/22  1229        POC Glucose 115   117   112   64   64                              Significant Imaging:  I have reviewed all pertinent imaging results/findings within the past 24 hours.

## 2022-05-26 NOTE — PROGRESS NOTES
[facility]  Nephrology  Progress Note    Patient Name: Og Garcia  MRN: [id number]  Admission Date: 12/23/2021  Hospital Length of Stay: 154 days  Attending Provider: Cecil Abernathy DO   Primary Care Physician: Hector Arndt DNP, FNP-C  Principal Problem:Denice gangrene    Subjective:     HPI: The patient is known from the previous nephrology consult at [facility].  He is no at Specialty and continues to need dialysis support.      Interval History: This patient is resting.  No acute changes.    Review of patient's allergies indicates:  No Known Allergies  Current Facility-Administered Medications   Medication Frequency    0.9%  NaCl infusion (for blood administration) Q24H PRN    0.9%  NaCl infusion PRN    acetaminophen tablet 500 mg Q6H PRN    albuterol nebulizer solution 2.5 mg Q4H PRN    albuterol-ipratropium 2.5 mg-0.5 mg/3 mL nebulizer solution 3 mL Q6H    bisacodyL EC tablet 10 mg Daily PRN    collagenase ointment Daily PRN    dextrose 50% injection 12.5 g PRN    diltiaZEM tablet 90 mg Q6H    [START ON 5/27/2022] epoetin beth-epbx injection 10,000 Units Every Mon, Wed, Fri    glucagon (human recombinant) injection 1 mg PRN    guaiFENesin 100 mg/5 ml syrup 200 mg Q6H    heparin (porcine) injection 4,000 Units PRN    heparin (porcine) injection 5,000 Units Q8H    insulin aspart U-100 injection 1-10 Units Q6H    insulin detemir U-100 injection 25 Units QHS    linezolid 600 mg/300 mL IVPB 600 mg Q12H    meropenem (MERREM) 500 mg in sodium chloride 0.9 % 100 mL IVPB (MB+) Q24H    metoprolol tartrate (LOPRESSOR) split tablet 12.5 mg BID    pantoprazole suspension 40 mg Daily    predniSONE tablet 2.5 mg Daily    sevelamer carbonate pwpk 0.8 g TID WM    simethicone chewable tablet 80 mg TID PRN    sodium chloride 0.9% bolus 250 mL PRN    tobramycin - pharmacy to dose pharmacy to manage frequency       Objective:     Vital Signs (Most Recent):  Temp: 99.4 °F  (37.4 °C) (05/26/22 1115)  Pulse: 101 (05/26/22 1315)  Resp: (!) 29 (05/26/22 1315)  BP: (!) 112/57 (05/26/22 1138)  SpO2: 96 % (05/26/22 1315)  O2 Device (Oxygen Therapy): Trach Collar (05/26/22 1315)   Vital Signs (24h Range):  Temp:  [98.8 °F (37.1 °C)-99.6 °F (37.6 °C)] 99.4 °F (37.4 °C)  Pulse:  [] 101  Resp:  [20-43] 29  SpO2:  [84 %-100 %] 96 %  BP: ()/(22-59) 112/57     Weight: 74.7 kg (164 lb 10.9 oz) (05/26/22 0534)  Body mass index is 22.97 kg/m².  Body surface area is 1.93 meters squared.    I/O last 3 completed shifts:  In: 2620 [P.O.:480; NG/GT:1740; IV Piggyback:400]  Out: 1900 [Other:1500; Stool:400]    Physical Exam  Vitals reviewed.   HENT:      Head: Normocephalic.   Cardiovascular:      Rate and Rhythm: Regular rhythm.      Pulses: Normal pulses.   Pulmonary:      Effort: Pulmonary effort is normal.   Abdominal:      Palpations: Abdomen is soft.   Neurological:      Mental Status: He is alert.       Significant Labs:  BMP:   Recent Labs   Lab 05/23/22  0636   *   *   K 3.9   CL 97*   CO2 25   BUN 71*   CREATININE 2.48*   CALCIUM 9.0     CBC:   Recent Labs   Lab 05/23/22  0636   WBC 16.58*   RBC 2.35*   HGB 7.4*   HCT 23.7*   *   .9*   MCH 31.5*   MCHC 31.2*        Significant Imaging:  Labs: Reviewed    Assessment/Plan:     ESRD (end stage renal disease)  5/23/2022Dialysis MWF  5/24/2022  Continue with dialysis as scheduled.  5/25/2022  Dialysis session is going well.  Continue current support.  5/26/2022  Continue with dialysis .        Thank you for your consult. I will follow-up with patient. Please contact us if you have any additional questions.    Ewdin Pryor Jr, MD  Nephrology  [facility]

## 2022-05-26 NOTE — SUBJECTIVE & OBJECTIVE
Interval History: This patient is resting.  No acute changes.    Review of patient's allergies indicates:  No Known Allergies  Current Facility-Administered Medications   Medication Frequency    0.9%  NaCl infusion (for blood administration) Q24H PRN    0.9%  NaCl infusion PRN    acetaminophen tablet 500 mg Q6H PRN    albuterol nebulizer solution 2.5 mg Q4H PRN    albuterol-ipratropium 2.5 mg-0.5 mg/3 mL nebulizer solution 3 mL Q6H    bisacodyL EC tablet 10 mg Daily PRN    collagenase ointment Daily PRN    dextrose 50% injection 12.5 g PRN    diltiaZEM tablet 90 mg Q6H    [START ON 5/27/2022] epoetin beth-epbx injection 10,000 Units Every Mon, Wed, Fri    glucagon (human recombinant) injection 1 mg PRN    guaiFENesin 100 mg/5 ml syrup 200 mg Q6H    heparin (porcine) injection 4,000 Units PRN    heparin (porcine) injection 5,000 Units Q8H    insulin aspart U-100 injection 1-10 Units Q6H    insulin detemir U-100 injection 25 Units QHS    linezolid 600 mg/300 mL IVPB 600 mg Q12H    meropenem (MERREM) 500 mg in sodium chloride 0.9 % 100 mL IVPB (MB+) Q24H    metoprolol tartrate (LOPRESSOR) split tablet 12.5 mg BID    pantoprazole suspension 40 mg Daily    predniSONE tablet 2.5 mg Daily    sevelamer carbonate pwpk 0.8 g TID WM    simethicone chewable tablet 80 mg TID PRN    sodium chloride 0.9% bolus 250 mL PRN    tobramycin - pharmacy to dose pharmacy to manage frequency       Objective:     Vital Signs (Most Recent):  Temp: 99.4 °F (37.4 °C) (05/26/22 1115)  Pulse: 101 (05/26/22 1315)  Resp: (!) 29 (05/26/22 1315)  BP: (!) 112/57 (05/26/22 1138)  SpO2: 96 % (05/26/22 1315)  O2 Device (Oxygen Therapy): Trach Collar (05/26/22 1315)   Vital Signs (24h Range):  Temp:  [98.8 °F (37.1 °C)-99.6 °F (37.6 °C)] 99.4 °F (37.4 °C)  Pulse:  [] 101  Resp:  [20-43] 29  SpO2:  [84 %-100 %] 96 %  BP: ()/(22-59) 112/57     Weight: 74.7 kg (164 lb 10.9 oz) (05/26/22 0534)  Body mass index is 22.97 kg/m².  Body surface area is  1.93 meters squared.    I/O last 3 completed shifts:  In: 2620 [P.O.:480; NG/GT:1740; IV Piggyback:400]  Out: 1900 [Other:1500; Stool:400]    Physical Exam  Vitals reviewed.   HENT:      Head: Normocephalic.   Cardiovascular:      Rate and Rhythm: Regular rhythm.      Pulses: Normal pulses.   Pulmonary:      Effort: Pulmonary effort is normal.   Abdominal:      Palpations: Abdomen is soft.   Neurological:      Mental Status: He is alert.       Significant Labs:  BMP:   Recent Labs   Lab 05/23/22  0636   *   *   K 3.9   CL 97*   CO2 25   BUN 71*   CREATININE 2.48*   CALCIUM 9.0     CBC:   Recent Labs   Lab 05/23/22  0636   WBC 16.58*   RBC 2.35*   HGB 7.4*   HCT 23.7*   *   .9*   MCH 31.5*   MCHC 31.2*        Significant Imaging:  Labs: Reviewed

## 2022-05-26 NOTE — NURSING
Unsuccessful attempts by staff members x3  To start new int .. unsuccessful     0116 ; secure  chat sent to DR. PELAEZ .. he was informed that dose of  Tobramycin was not given tonight due to no iv access and several attempts to restart new int was unsuccessful . Return message from DR. PELAEZ was to notify iv team in am 5/26.. note sent to pharmacy also about dose not given due to no iv site and inability to   Start new int

## 2022-05-26 NOTE — PROGRESS NOTES
[facility]  Pulmonology  Progress Note    Patient Name: Og Garcia  MRN: [id number]  Admission Date: 12/23/2021  Hospital Length of Stay: 154 days  Code Status: Prior  Attending Provider: Cecil Abernathy DO  Primary Care Provider: Hector Arndt DNP, FNP-C   Principal Problem: Denice gangrene    Subjective:     Interval History:  No history    Objective:     Vital Signs (Most Recent):  Temp: 98.8 °F (37.1 °C) (05/26/22 0400)  Pulse: 90 (05/26/22 0600)  Resp: (!) 29 (05/26/22 0600)  BP: (!) 80/40 (05/26/22 0600)  SpO2: 100 % (05/26/22 0600)   Vital Signs (24h Range):  Temp:  [98.3 °F (36.8 °C)-102.4 °F (39.1 °C)] 98.8 °F (37.1 °C)  Pulse:  [] 90  Resp:  [20-44] 29  SpO2:  [84 %-100 %] 100 %  BP: ()/(22-62) 80/40     Weight: 74.7 kg (164 lb 10.9 oz)  Body mass index is 22.97 kg/m².      Intake/Output Summary (Last 24 hours) at 5/26/2022 0648  Last data filed at 5/26/2022 0600  Gross per 24 hour   Intake 1750 ml   Output 1800 ml   Net -50 ml       Physical Exam  Vitals reviewed.   Constitutional:       Appearance: Normal appearance.      Interventions: He is not intubated.  HENT:      Head: Normocephalic and atraumatic.      Nose: Nose normal.      Mouth/Throat:      Mouth: Mucous membranes are dry.      Pharynx: Oropharynx is clear.   Eyes:      Extraocular Movements: Extraocular movements intact.      Conjunctiva/sclera: Conjunctivae normal.      Pupils: Pupils are equal, round, and reactive to light.   Cardiovascular:      Rate and Rhythm: Normal rate.      Heart sounds: Normal heart sounds. No murmur heard.  Pulmonary:      Effort: Pulmonary effort is normal. He is not intubated.      Breath sounds: Normal breath sounds.   Abdominal:      General: Abdomen is flat. Bowel sounds are normal.      Palpations: Abdomen is soft.   Musculoskeletal:         General: Normal range of motion.      Cervical back: Normal range of motion and neck supple.      Right lower leg: No  edema.      Left lower leg: No edema.   Skin:     General: Skin is warm and dry.      Capillary Refill: Capillary refill takes less than 2 seconds.   Neurological:      General: No focal deficit present.      Mental Status: He is alert and oriented to person, place, and time.   Psychiatric:         Mood and Affect: Mood normal.         Behavior: Behavior normal.       Vents:  Vent Mode: CPAP / PSV (05/22/22 0044)  Ventilator Initiated: No (04/23/22 1934)  Set Rate: 0 BPM (05/18/22 0516)  Vt Set: 0 mL (05/18/22 0516)  Pressure Support: 15 cmH20 (05/22/22 0044)  PEEP/CPAP: 5 cmH20 (05/22/22 0044)  Oxygen Concentration (%): 35 (05/26/22 0600)  Peak Airway Pressure: 21 cmH2O (05/22/22 0044)  Plateau Pressure: 20 cmH20 (03/07/22 0500)  Total Ve: 7.8 mL (05/22/22 0044)  F/VT Ratio<105 (RSBI): (!) 22.64 (05/22/22 0044)    Lines/Drains/Airways       Central Venous Catheter Line  Duration                  Hemodialysis Catheter 12/30/21 0900 right internal jugular 146 days              Drain  Duration                  Colostomy 12/18/21 1030 Descending/sigmoid  days         Gastrostomy/Enterostomy 03/09/22 1436 Percutaneous endoscopic gastrostomy (PEG) midline feeding 77 days              Airway  Duration                  Surgical Airway 04/27/22 0856 Shiley;Other (Comment) Cuffed;Long 28 days              Peripheral Intravenous Line  Duration                  Peripheral IV - Single Lumen 05/26/22 0210 22 G Left;Posterior Hand <1 day                    Significant Labs:    CBC/Anemia Profile:  Recent Labs   Lab 05/24/22  1042   IRON 27*   FERRITIN 5,343*   FOLATE 18.5*   [id number] 1,234*        Chemistries:  No results for input(s): NA, K, CL, CO2, BUN, CREATININE, CALCIUM, ALBUMIN, PROT, BILITOT, ALKPHOS, ALT, AST, GLUCOSE, MG, PHOS in the last 48 hours.    Recent Lab Results  (Last 5 results in the past 24 hours)        05/26/22  0507   05/26/22  0038   05/25/22  1810   05/25/22  1349   05/25/22  1229        POC  Glucose 115   117   112   64   64                              Significant Imaging:  I have reviewed all pertinent imaging results/findings within the past 24 hours.    Assessment/Plan:     * Denice gangrene  Stable with no acute issues          Chronic respiratory failure  I am not sure we need downsized this patient's trach I do not believe will ever have his trach removed mental status is stable need start looking for nursing home placement    Hypertension  Blood pressure is low will decrease the Cardizem to 60 q.6        Pressure ulcer of sacral region, stage 4  Wound care is following the patient  Their help reviewed and appreciated    Fever  Patient with elevated temp positive wound cultures back on antibiotics    ESRD (end stage renal disease)  Started on HD 12/30   Continue with HD per nephrology  Ask Renal about Epogen      Type 2 diabetes mellitus without complication, without long-term current use of insulin  Blood sugar is well controlled    Acute blood loss anemia  No active bleeding  Hgb 6.0 on 4/11  Patient transfused 1 unit with dialysis yesterday  4/13 -H/H 7.5/22.7  4/15- hgb is 6.2 today. We are going to give another unit of PRBC's today with dialysis  4/16 post transfusion cbc is pending  4/18- hgb is 6.6. Will plan for transfusion with 1 unit of prbc's with next HD  4/21 - 7.2/21.6   4/25- 6.6/19.7 - will transfuse one unit PRBCs with next HD   4/28- Hgb now 8.0 following transfusion  5/2/2022 hemoglobin 7 no need for transfusion yet  5/3/2022 transfuse 1 unit packed red blood cells  05/05/2022 I believe this anemia is probably related to his chronic renal sufficiency a wonder if he is a candidate for Epogen at this point  5/10- transfused 1 unit yesterday  5/12 repeat H&H in am  05/13/2022 in 2 H&H pending  Iron levels are okay will ask Renal about Epogen                 Jose Urban MD  Pulmonology  [facility]

## 2022-05-26 NOTE — ASSESSMENT & PLAN NOTE
5/23/2022Dialysis MWF  5/24/2022  Continue with dialysis as scheduled.  5/25/2022  Dialysis session is going well.  Continue current support.  5/26/2022  Continue with dialysis .

## 2022-05-26 NOTE — ASSESSMENT & PLAN NOTE
No active bleeding  Hgb 6.0 on 4/11  Patient transfused 1 unit with dialysis yesterday  4/13 -H/H 7.5/22.7  4/15- hgb is 6.2 today. We are going to give another unit of PRBC's today with dialysis  4/16 post transfusion cbc is pending  4/18- hgb is 6.6. Will plan for transfusion with 1 unit of prbc's with next HD  4/21 - 7.2/21.6   4/25- 6.6/19.7 - will transfuse one unit PRBCs with next HD   4/28- Hgb now 8.0 following transfusion  5/2/2022 hemoglobin 7 no need for transfusion yet  5/3/2022 transfuse 1 unit packed red blood cells  05/05/2022 I believe this anemia is probably related to his chronic renal sufficiency a wonder if he is a candidate for Epogen at this point  5/10- transfused 1 unit yesterday  5/12 repeat H&H in am  05/13/2022 in 2 H&H pending  Iron levels are okay will ask Renal about Epogen

## 2022-05-27 NOTE — SUBJECTIVE & OBJECTIVE
Interval History: The patient is doing acceptable.  He has completed dialysis and tolerated the procedure.      Review of patient's allergies indicates:  No Known Allergies  Current Facility-Administered Medications   Medication Frequency    0.9%  NaCl infusion (for blood administration) Q24H PRN    0.9%  NaCl infusion PRN    acetaminophen tablet 500 mg Q6H PRN    albuterol nebulizer solution 2.5 mg Q4H PRN    albuterol-ipratropium 2.5 mg-0.5 mg/3 mL nebulizer solution 3 mL Q6H    bisacodyL EC tablet 10 mg Daily PRN    collagenase ointment Daily PRN    dextrose 50% injection 12.5 g PRN    diltiaZEM tablet 90 mg Q6H    epoetin beth-epbx injection 10,000 Units Every Mon, Wed, Fri    glucagon (human recombinant) injection 1 mg PRN    guaiFENesin 100 mg/5 ml syrup 200 mg Q6H    heparin (porcine) injection 4,000 Units PRN    heparin (porcine) injection 5,000 Units Q8H    insulin aspart U-100 injection 1-10 Units Q6H    insulin detemir U-100 injection 25 Units QHS    linezolid 600 mg/300 mL IVPB 600 mg Q12H    meropenem (MERREM) 500 mg in sodium chloride 0.9 % 100 mL IVPB (MB+) Q24H    metoprolol tartrate (LOPRESSOR) split tablet 12.5 mg BID    pantoprazole suspension 40 mg Daily    predniSONE tablet 2.5 mg Daily    sevelamer carbonate pwpk 0.8 g TID WM    simethicone chewable tablet 80 mg TID PRN    sodium chloride 0.9% bolus 250 mL PRN    tobramycin - pharmacy to dose pharmacy to manage frequency       Objective:     Vital Signs (Most Recent):  Temp: (!) 101.1 °F (38.4 °C) (05/27/22 0700)  Pulse: 110 (05/27/22 1105)  Resp: (!) 48 (05/27/22 1105)  BP: (!) 100/54 (05/27/22 1105)  SpO2: 95 % (05/27/22 1105)  O2 Device (Oxygen Therapy): Trach Collar (05/27/22 0726)   Vital Signs (24h Range):  Temp:  [99 °F (37.2 °C)-101.1 °F (38.4 °C)] 101.1 °F (38.4 °C)  Pulse:  [100-111] 110  Resp:  [26-48] 48  SpO2:  [92 %-99 %] 95 %  BP: ()/(40-64) 100/54     Weight: 75.7 kg (166 lb 14.2 oz) (05/27/22 0615)  Body mass index is  23.28 kg/m².  Body surface area is 1.95 meters squared.    I/O last 3 completed shifts:  In: 2440 [NG/GT:1740; IV Piggyback:700]  Out: 700 [Stool:700]    Physical Exam  Vitals reviewed.   HENT:      Head: Normocephalic.   Cardiovascular:      Rate and Rhythm: Regular rhythm.   Pulmonary:      Effort: Pulmonary effort is normal.   Abdominal:      Palpations: Abdomen is soft.   Neurological:      Mental Status: He is alert.       Significant Labs:  BMP:   Recent Labs   Lab 05/27/22  0320   GLU 89   *   K 4.5   CL 91*   CO2 23   BUN 72*   CREATININE 2.24*   CALCIUM 8.0*     CBC:   Recent Labs   Lab 05/27/22  0320   WBC 17.81*   RBC 2.46*   HGB 7.9*   HCT 24.0*   *   MCV 97.6*   MCH 32.1*   MCHC 32.9        Significant Imaging:  Labs: Reviewed

## 2022-05-27 NOTE — DIALYSIS ROUNDING
Mr. Garcia is seen during his hemodialysis. He is awake but doesn't communicate during my visit. Dialysis going well at present. Next dialysis Monday.

## 2022-05-27 NOTE — ASSESSMENT & PLAN NOTE
I am not sure we need downsized this patient's trach I do not believe will ever have his trach removed mental status is stable need start looking for nursing home placement  5/27- He is doing well with trach collar

## 2022-05-27 NOTE — SUBJECTIVE & OBJECTIVE
Interval History: No acute events overnight. He is currently resting comfortably. Oxygenating adequately on trach collar. Tmax of 101.1 in last 24 hours.    Objective:     Vital Signs (Most Recent):  Temp: (!) 101.1 °F (38.4 °C) (05/27/22 0700)  Pulse: 106 (05/27/22 0900)  Resp: (!) 39 (05/27/22 0900)  BP: (!) 96/43 (05/27/22 0900)  SpO2: 98 % (05/27/22 0900)   Vital Signs (24h Range):  Temp:  [99 °F (37.2 °C)-101.1 °F (38.4 °C)] 101.1 °F (38.4 °C)  Pulse:  [] 106  Resp:  [26-45] 39  SpO2:  [92 %-100 %] 98 %  BP: ()/(40-64) 96/43     Weight: 75.7 kg (166 lb 14.2 oz)  Body mass index is 23.28 kg/m².      Intake/Output Summary (Last 24 hours) at 5/27/2022 0915  Last data filed at 5/27/2022 0615  Gross per 24 hour   Intake 1170 ml   Output 250 ml   Net 920 ml         Physical Exam    Vents:  Vent Mode: CPAP / PSV (05/22/22 0044)  Ventilator Initiated: No (04/23/22 1934)  Set Rate: 0 BPM (05/18/22 0516)  Vt Set: 0 mL (05/18/22 0516)  Pressure Support: 15 cmH20 (05/22/22 0044)  PEEP/CPAP: 5 cmH20 (05/22/22 0044)  Oxygen Concentration (%): 35 (05/27/22 0726)  Peak Airway Pressure: 21 cmH2O (05/22/22 0044)  Plateau Pressure: 20 cmH20 (03/07/22 0500)  Total Ve: 7.8 mL (05/22/22 0044)  F/VT Ratio<105 (RSBI): (!) 22.64 (05/22/22 0044)    Lines/Drains/Airways       Central Venous Catheter Line  Duration                  Hemodialysis Catheter 12/30/21 0900 right internal jugular 147 days              Drain  Duration                  Colostomy 12/18/21 1030 Descending/sigmoid  days         Gastrostomy/Enterostomy 03/09/22 1436 Percutaneous endoscopic gastrostomy (PEG) midline feeding 78 days              Airway  Duration                  Surgical Airway 04/27/22 0856 Marychuy;Other (Comment) Cuffed;Long 30 days              Peripheral Intravenous Line  Duration                  Peripheral IV - Single Lumen 05/26/22 0210 22 G Left;Posterior Hand 1 day         Peripheral IV - Single Lumen 05/27/22 0855 20 G  Posterior;Right Hand <1 day                    Significant Labs:    CBC/Anemia Profile:  Recent Labs   Lab 05/27/22  0320   WBC 17.81*   HGB 7.9*   HCT 24.0*   *   MCV 97.6*   RDW 14.3          Chemistries:  Recent Labs   Lab 05/27/22  0320   *   K 4.5   CL 91*   CO2 23   BUN 72*   CREATININE 2.24*   CALCIUM 8.0*         All pertinent labs within the past 24 hours have been reviewed.    Significant Imaging:  I have reviewed all pertinent imaging results/findings within the past 24 hours.

## 2022-05-27 NOTE — ASSESSMENT & PLAN NOTE
Patient with elevated temp positive wound cultures back on antibiotics  Had a positive bone culture  Now on merrem and linezolid  When ID returns will ask for their opinion on antibiotic regimen

## 2022-05-27 NOTE — PROGRESS NOTES
[facility]  Nephrology  Progress Note    Patient Name: Og Garcia  MRN: [id number]  Admission Date: 12/23/2021  Hospital Length of Stay: 155 days  Attending Provider: Cecil Abernathy DO   Primary Care Physician: Hector Arndt DNP, FNP-C  Principal Problem:Denice gangrene    Subjective:     HPI: The patient is known from the previous nephrology consult at [facility].  He is no at Specialty and continues to need dialysis support.      Interval History: The patient is doing acceptable.  He has completed dialysis and tolerated the procedure.      Review of patient's allergies indicates:  No Known Allergies  Current Facility-Administered Medications   Medication Frequency    0.9%  NaCl infusion (for blood administration) Q24H PRN    0.9%  NaCl infusion PRN    acetaminophen tablet 500 mg Q6H PRN    albuterol nebulizer solution 2.5 mg Q4H PRN    albuterol-ipratropium 2.5 mg-0.5 mg/3 mL nebulizer solution 3 mL Q6H    bisacodyL EC tablet 10 mg Daily PRN    collagenase ointment Daily PRN    dextrose 50% injection 12.5 g PRN    diltiaZEM tablet 90 mg Q6H    epoetin beth-epbx injection 10,000 Units Every Mon, Wed, Fri    glucagon (human recombinant) injection 1 mg PRN    guaiFENesin 100 mg/5 ml syrup 200 mg Q6H    heparin (porcine) injection 4,000 Units PRN    heparin (porcine) injection 5,000 Units Q8H    insulin aspart U-100 injection 1-10 Units Q6H    insulin detemir U-100 injection 25 Units QHS    linezolid 600 mg/300 mL IVPB 600 mg Q12H    meropenem (MERREM) 500 mg in sodium chloride 0.9 % 100 mL IVPB (MB+) Q24H    metoprolol tartrate (LOPRESSOR) split tablet 12.5 mg BID    pantoprazole suspension 40 mg Daily    predniSONE tablet 2.5 mg Daily    sevelamer carbonate pwpk 0.8 g TID WM    simethicone chewable tablet 80 mg TID PRN    sodium chloride 0.9% bolus 250 mL PRN    tobramycin - pharmacy to dose pharmacy to manage frequency       Objective:     Vital Signs (Most  Recent):  Temp: (!) 101.1 °F (38.4 °C) (05/27/22 0700)  Pulse: 110 (05/27/22 1105)  Resp: (!) 48 (05/27/22 1105)  BP: (!) 100/54 (05/27/22 1105)  SpO2: 95 % (05/27/22 1105)  O2 Device (Oxygen Therapy): Trach Collar (05/27/22 0726)   Vital Signs (24h Range):  Temp:  [99 °F (37.2 °C)-101.1 °F (38.4 °C)] 101.1 °F (38.4 °C)  Pulse:  [100-111] 110  Resp:  [26-48] 48  SpO2:  [92 %-99 %] 95 %  BP: ()/(40-64) 100/54     Weight: 75.7 kg (166 lb 14.2 oz) (05/27/22 0615)  Body mass index is 23.28 kg/m².  Body surface area is 1.95 meters squared.    I/O last 3 completed shifts:  In: 2440 [NG/GT:1740; IV Piggyback:700]  Out: 700 [Stool:700]    Physical Exam  Vitals reviewed.   HENT:      Head: Normocephalic.   Cardiovascular:      Rate and Rhythm: Regular rhythm.   Pulmonary:      Effort: Pulmonary effort is normal.   Abdominal:      Palpations: Abdomen is soft.   Neurological:      Mental Status: He is alert.       Significant Labs:  BMP:   Recent Labs   Lab 05/27/22  0320   GLU 89   *   K 4.5   CL 91*   CO2 23   BUN 72*   CREATININE 2.24*   CALCIUM 8.0*     CBC:   Recent Labs   Lab 05/27/22  0320   WBC 17.81*   RBC 2.46*   HGB 7.9*   HCT 24.0*   *   MCV 97.6*   MCH 32.1*   MCHC 32.9        Significant Imaging:  Labs: Reviewed    Assessment/Plan:     ESRD (end stage renal disease)  5/23/2022Dialysis MWF  5/24/2022  Continue with dialysis as scheduled.  5/25/2022  Dialysis session is going well.  Continue current support.  5/26/2022  Continue with dialysis .  5/27/2022  Dialysis as scheduled on MWF.     Acute blood loss anemia  1.  The patient is receiving PRBCs.  5/11/2022 Anemia is chronic and patient has required PRBCs  5/27/2022  Continue with epogen 10,000 units on dialysis.        Thank you for your consult. I will follow-up with patient. Please contact us if you have any additional questions.    Edwin Pryor Jr, MD  Nephrology  [facility]

## 2022-05-27 NOTE — PLAN OF CARE
Problem: Adult Inpatient Plan of Care  Goal: Plan of Care Review  Outcome: Ongoing, Progressing  Goal: Optimal Comfort and Wellbeing  Outcome: Ongoing, Progressing     Problem: Adjustment to Illness (Sepsis/Septic Shock)  Goal: Optimal Coping  Outcome: Ongoing, Progressing     Problem: Bleeding (Sepsis/Septic Shock)  Goal: Absence of Bleeding  Outcome: Ongoing, Progressing     Problem: Glycemic Control Impaired (Sepsis/Septic Shock)  Goal: Blood Glucose Level Within Desired Range  Outcome: Ongoing, Progressing     Problem: Nutrition Impaired (Sepsis/Septic Shock)  Goal: Optimal Nutrition Intake  Outcome: Ongoing, Progressing     Problem: Oral Intake Inadequate (Acute Kidney Injury/Impairment)  Goal: Optimal Nutrition Intake  Outcome: Ongoing, Progressing     Problem: Adult Inpatient Plan of Care  Goal: Patient-Specific Goal (Individualized)  Outcome: Ongoing, Not Progressing  Goal: Readiness for Transition of Care  Outcome: Ongoing, Not Progressing     Problem: Infection Progression (Sepsis/Septic Shock)  Goal: Absence of Infection Signs and Symptoms  Outcome: Ongoing, Not Progressing     Problem: Fluid and Electrolyte Imbalance (Acute Kidney Injury/Impairment)  Goal: Fluid and Electrolyte Balance  Outcome: Ongoing, Not Progressing     Problem: Renal Function Impairment (Acute Kidney Injury/Impairment)  Goal: Effective Renal Function  Outcome: Ongoing, Not Progressing

## 2022-05-27 NOTE — ASSESSMENT & PLAN NOTE
Blood pressure on lower side- asymptomatic  Continue with current care for now  He is on cardizem 90 mg q6 and metoprolol 12.5 bid both of which were started more for heart rate control

## 2022-05-27 NOTE — ASSESSMENT & PLAN NOTE
1.  The patient is receiving PRBCs.  5/11/2022 Anemia is chronic and patient has required PRBCs  5/27/2022  Continue with epogen 10,000 units on dialysis.

## 2022-05-27 NOTE — PROGRESS NOTES
[facility]  Pulmonology  Progress Note    Patient Name: Og Garcia  MRN: [id number]  Admission Date: 12/23/2021  Hospital Length of Stay: 155 days  Code Status: Prior  Attending Provider: Cecil Abernathy DO  Primary Care Provider: Hector Arndt DNP, FNP-C   Principal Problem: Denice gangrene    Subjective:     Interval History: No acute events overnight. He is currently resting comfortably. Oxygenating adequately on trach collar. Tmax of 101.1 in last 24 hours.    Objective:     Vital Signs (Most Recent):  Temp: (!) 101.1 °F (38.4 °C) (05/27/22 0700)  Pulse: 106 (05/27/22 0900)  Resp: (!) 39 (05/27/22 0900)  BP: (!) 96/43 (05/27/22 0900)  SpO2: 98 % (05/27/22 0900)   Vital Signs (24h Range):  Temp:  [99 °F (37.2 °C)-101.1 °F (38.4 °C)] 101.1 °F (38.4 °C)  Pulse:  [] 106  Resp:  [26-45] 39  SpO2:  [92 %-100 %] 98 %  BP: ()/(40-64) 96/43     Weight: 75.7 kg (166 lb 14.2 oz)  Body mass index is 23.28 kg/m².      Intake/Output Summary (Last 24 hours) at 5/27/2022 0915  Last data filed at 5/27/2022 0615  Gross per 24 hour   Intake 1170 ml   Output 250 ml   Net 920 ml         Physical Exam    Vents:  Vent Mode: CPAP / PSV (05/22/22 0044)  Ventilator Initiated: No (04/23/22 1934)  Set Rate: 0 BPM (05/18/22 0516)  Vt Set: 0 mL (05/18/22 0516)  Pressure Support: 15 cmH20 (05/22/22 0044)  PEEP/CPAP: 5 cmH20 (05/22/22 0044)  Oxygen Concentration (%): 35 (05/27/22 0726)  Peak Airway Pressure: 21 cmH2O (05/22/22 0044)  Plateau Pressure: 20 cmH20 (03/07/22 0500)  Total Ve: 7.8 mL (05/22/22 0044)  F/VT Ratio<105 (RSBI): (!) 22.64 (05/22/22 0044)    Lines/Drains/Airways       Central Venous Catheter Line  Duration                  Hemodialysis Catheter 12/30/21 0900 right internal jugular 147 days              Drain  Duration                  Colostomy 12/18/21 1030 Descending/sigmoid  days         Gastrostomy/Enterostomy 03/09/22 1436 Percutaneous endoscopic gastrostomy  (PEG) midline feeding 78 days              Airway  Duration                  Surgical Airway 04/27/22 0856 Marychuy;Other (Comment) Cuffed;Long 30 days              Peripheral Intravenous Line  Duration                  Peripheral IV - Single Lumen 05/26/22 0210 22 G Left;Posterior Hand 1 day         Peripheral IV - Single Lumen 05/27/22 0855 20 G Posterior;Right Hand <1 day                    Significant Labs:    CBC/Anemia Profile:  Recent Labs   Lab 05/27/22  0320   WBC 17.81*   HGB 7.9*   HCT 24.0*   *   MCV 97.6*   RDW 14.3          Chemistries:  Recent Labs   Lab 05/27/22  0320   *   K 4.5   CL 91*   CO2 23   BUN 72*   CREATININE 2.24*   CALCIUM 8.0*         All pertinent labs within the past 24 hours have been reviewed.    Significant Imaging:  I have reviewed all pertinent imaging results/findings within the past 24 hours.    Assessment/Plan:     * Denice gangrene  Stable with no acute issues          Chronic respiratory failure  I am not sure we need downsized this patient's trach I do not believe will ever have his trach removed mental status is stable need start looking for nursing home placement  5/27- He is doing well with trach collar    Tachycardia  Treated anemia   - concern this may be releated to pain and pain medication withdraw - his fentanyl patch was discontinued over the weekend which he has been on for weeks.  -plan - will start PO pain medication q8hrs then decrease to q12 hours after 7 days.   4/8 - improving   4/9-heart rate up on exam.  Secondary to pain.  Will treat p.r.n. pain medication.  4/14- had some SVT yesterday that improved with treating his pain and lopressor  4/22- HR in 110's on ditalizem - will add low dose BB   4/25- HR improved - BB was held due to HD this morning   4/28- HR around 100 today  5/27- HR low 100's this am---patient is on cardizem and metoprolol      Pressure ulcer of sacral region, stage 4  Wound care is following the patient  Their help reviewed  and appreciated    Fever  Patient with elevated temp positive wound cultures back on antibiotics  Had a positive bone culture  Now on merrem and linezolid  When ID returns will ask for their opinion on antibiotic regimen    ESRD (end stage renal disease)  Started on HD 12/30   Continue with HD per nephrology  Now on epogen      Type 2 diabetes mellitus without complication, without long-term current use of insulin  Blood sugar is well controlled  Continue with levemir 25 units and sliding scale    Acute blood loss anemia  No active bleeding  Hgb 6.0 on 4/11  Patient transfused 1 unit with dialysis yesterday  4/13 -H/H 7.5/22.7  4/15- hgb is 6.2 today. We are going to give another unit of PRBC's today with dialysis  4/16 post transfusion cbc is pending  4/18- hgb is 6.6. Will plan for transfusion with 1 unit of prbc's with next HD  4/21 - 7.2/21.6   4/25- 6.6/19.7 - will transfuse one unit PRBCs with next HD   4/28- Hgb now 8.0 following transfusion  5/2/2022 hemoglobin 7 no need for transfusion yet  5/3/2022 transfuse 1 unit packed red blood cells  05/05/2022 I believe this anemia is probably related to his chronic renal sufficiency a wonder if he is a candidate for Epogen at this point  5/10- transfused 1 unit yesterday  5/12 repeat H&H in am  05/13/2022 in 2 H&H pending  Iron levels are okay will ask Renal about Epogen  5/27- now on epogen  Recent Hgb is 7.9    Hypertension  Blood pressure on lower side- asymptomatic  Continue with current care for now  He is on cardizem 90 mg q6 and metoprolol 12.5 bid both of which were started more for heart rate control                       Cecil Abernathy, DO  Pulmonology  [facility]

## 2022-05-27 NOTE — ASSESSMENT & PLAN NOTE
Treated anemia   - concern this may be releated to pain and pain medication withdraw - his fentanyl patch was discontinued over the weekend which he has been on for weeks.  -plan - will start PO pain medication q8hrs then decrease to q12 hours after 7 days.   4/8 - improving   4/9-heart rate up on exam.  Secondary to pain.  Will treat p.r.n. pain medication.  4/14- had some SVT yesterday that improved with treating his pain and lopressor  4/22- HR in 110's on ditalizem - will add low dose BB   4/25- HR improved - BB was held due to HD this morning   4/28- HR around 100 today  5/27- HR low 100's this am---patient is on cardizem and metoprolol

## 2022-05-27 NOTE — ASSESSMENT & PLAN NOTE
No active bleeding  Hgb 6.0 on 4/11  Patient transfused 1 unit with dialysis yesterday  4/13 -H/H 7.5/22.7  4/15- hgb is 6.2 today. We are going to give another unit of PRBC's today with dialysis  4/16 post transfusion cbc is pending  4/18- hgb is 6.6. Will plan for transfusion with 1 unit of prbc's with next HD  4/21 - 7.2/21.6   4/25- 6.6/19.7 - will transfuse one unit PRBCs with next HD   4/28- Hgb now 8.0 following transfusion  5/2/2022 hemoglobin 7 no need for transfusion yet  5/3/2022 transfuse 1 unit packed red blood cells  05/05/2022 I believe this anemia is probably related to his chronic renal sufficiency a wonder if he is a candidate for Epogen at this point  5/10- transfused 1 unit yesterday  5/12 repeat H&H in am  05/13/2022 in 2 H&H pending  Iron levels are okay will ask Renal about Epogen  5/27- now on epogen  Recent Hgb is 7.9

## 2022-05-27 NOTE — ASSESSMENT & PLAN NOTE
5/23/2022Dialysis MWF  5/24/2022  Continue with dialysis as scheduled.  5/25/2022  Dialysis session is going well.  Continue current support.  5/26/2022  Continue with dialysis .  5/27/2022  Dialysis as scheduled on MWF.

## 2022-05-28 NOTE — SUBJECTIVE & OBJECTIVE
Interval History: No acute events overnight. He is currently resting comfortably. Oxygenating adequately on trach collar. He is afebrile this am and vital signs are stable.    Objective:     Vital Signs (Most Recent):  Temp: 98.4 °F (36.9 °C) (05/28/22 0700)  Pulse: 98 (05/28/22 0800)  Resp: (!) 34 (05/28/22 0737)  BP: (!) 109/54 (05/28/22 0800)  SpO2: 100 % (05/28/22 0737)   Vital Signs (24h Range):  Temp:  [97.5 °F (36.4 °C)-100.4 °F (38 °C)] 98.4 °F (36.9 °C)  Pulse:  [] 98  Resp:  [20-52] 34  SpO2:  [92 %-100 %] 100 %  BP: ()/(42-67) 109/54     Weight: 77.5 kg (170 lb 13.7 oz)  Body mass index is 23.83 kg/m².      Intake/Output Summary (Last 24 hours) at 5/28/2022 0953  Last data filed at 5/28/2022 0800  Gross per 24 hour   Intake 3480 ml   Output 1400 ml   Net 2080 ml         Physical Exam    Vents:  Vent Mode: Standby (05/28/22 0415)  Ventilator Initiated: No (04/23/22 1934)  Set Rate: 0 BPM (05/18/22 0516)  Vt Set: 0 mL (05/18/22 0516)  Pressure Support: 15 cmH20 (05/22/22 0044)  PEEP/CPAP: 5 cmH20 (05/22/22 0044)  Oxygen Concentration (%): 35 (05/28/22 0737)  Peak Airway Pressure: 21 cmH2O (05/22/22 0044)  Plateau Pressure: 20 cmH20 (03/07/22 0500)  Total Ve: 7.8 mL (05/22/22 0044)  F/VT Ratio<105 (RSBI): (!) 22.64 (05/22/22 0044)    Lines/Drains/Airways       Central Venous Catheter Line  Duration                  Hemodialysis Catheter 12/30/21 0900 right internal jugular 148 days              Drain  Duration                  Colostomy 12/18/21 1030 Descending/sigmoid  days         Gastrostomy/Enterostomy 03/09/22 1436 Percutaneous endoscopic gastrostomy (PEG) midline feeding 79 days              Airway  Duration                  Surgical Airway 04/27/22 0856 Marychuy;Other (Comment) Cuffed;Long 31 days              Peripheral Intravenous Line  Duration                  Peripheral IV - Single Lumen 05/26/22 0210 22 G Left;Posterior Hand 2 days         Peripheral IV - Single Lumen 05/27/22  0855 20 G Posterior;Right Hand 1 day                    Significant Labs:    CBC/Anemia Profile:  Recent Labs   Lab 05/27/22  0320   WBC 17.81*   HGB 7.9*   HCT 24.0*   *   MCV 97.6*   RDW 14.3          Chemistries:  Recent Labs   Lab 05/27/22  0320   *   K 4.5   CL 91*   CO2 23   BUN 72*   CREATININE 2.24*   CALCIUM 8.0*         All pertinent labs within the past 24 hours have been reviewed.    Significant Imaging:  I have reviewed all pertinent imaging results/findings within the past 24 hours.

## 2022-05-28 NOTE — PLAN OF CARE
Plans of care ongoing  Problem: Adult Inpatient Plan of Care  Goal: Plan of Care Review  Outcome: Ongoing, Progressing  Goal: Patient-Specific Goal (Individualized)  Outcome: Ongoing, Progressing  Goal: Absence of Hospital-Acquired Illness or Injury  Outcome: Ongoing, Progressing  Goal: Optimal Comfort and Wellbeing  Outcome: Ongoing, Progressing  Goal: Readiness for Transition of Care  Outcome: Ongoing, Progressing     Problem: Adjustment to Illness (Sepsis/Septic Shock)  Goal: Optimal Coping  Outcome: Ongoing, Progressing     Problem: Bleeding (Sepsis/Septic Shock)  Goal: Absence of Bleeding  Outcome: Ongoing, Progressing     Problem: Glycemic Control Impaired (Sepsis/Septic Shock)  Goal: Blood Glucose Level Within Desired Range  Outcome: Ongoing, Progressing     Problem: Infection Progression (Sepsis/Septic Shock)  Goal: Absence of Infection Signs and Symptoms  Outcome: Ongoing, Progressing     Problem: Nutrition Impaired (Sepsis/Septic Shock)  Goal: Optimal Nutrition Intake  Outcome: Ongoing, Progressing     Problem: Fluid and Electrolyte Imbalance (Acute Kidney Injury/Impairment)  Goal: Fluid and Electrolyte Balance  Outcome: Ongoing, Progressing     Problem: Oral Intake Inadequate (Acute Kidney Injury/Impairment)  Goal: Optimal Nutrition Intake  Outcome: Ongoing, Progressing     Problem: Renal Function Impairment (Acute Kidney Injury/Impairment)  Goal: Effective Renal Function  Outcome: Ongoing, Progressing     Problem: Infection  Goal: Absence of Infection Signs and Symptoms  Outcome: Ongoing, Progressing     Problem: Fall Injury Risk  Goal: Absence of Fall and Fall-Related Injury  Outcome: Ongoing, Progressing     Problem: Communication Impairment (Artificial Airway)  Goal: Effective Communication  Outcome: Ongoing, Progressing     Problem: Device-Related Complication Risk (Artificial Airway)  Goal: Optimal Device Function  Outcome: Ongoing, Progressing     Problem: Skin and Tissue Injury (Artificial  Airway)  Goal: Absence of Device-Related Skin or Tissue Injury  Outcome: Ongoing, Progressing     Problem: Skin Injury Risk Increased  Goal: Skin Health and Integrity  Outcome: Ongoing, Progressing     Problem: Device-Related Complication Risk (Hemodialysis)  Goal: Safe, Effective Therapy Delivery  Outcome: Ongoing, Progressing     Problem: Hemodynamic Instability (Hemodialysis)  Goal: Effective Tissue Perfusion  Outcome: Ongoing, Progressing     Problem: Infection (Hemodialysis)  Goal: Absence of Infection Signs and Symptoms  Outcome: Ongoing, Progressing     Problem: Diabetes Comorbidity  Goal: Blood Glucose Level Within Targeted Range  Outcome: Ongoing, Progressing     Problem: Impaired Wound Healing  Goal: Optimal Wound Healing  Outcome: Ongoing, Progressing     Problem: Gas Exchange Impaired  Goal: Optimal Gas Exchange  Outcome: Ongoing, Progressing     Problem: Breathing Pattern Ineffective  Goal: Effective Breathing Pattern  Outcome: Ongoing, Progressing

## 2022-05-28 NOTE — PROGRESS NOTES
Patient is resting comfortably.  He did open his eyes during the exam.  He seemed to fix and follow    Physical exam general patient is chronically ill-appearing, heart is regular rate and rhythm, he has no pitting edema, lungs are clear to auscultation anteriorly, abdomen is soft with positive bowel sounds    Assessment/plan 1.  ESRD-continue HD support  2. Sacral wound-continue wound care   3.  Anemia-patient's hematocrit is 24%  4. Diabetes mellitus-continue present hypoglycemic regimen   5. Debility  6. Respiratory failure continue trach care   7. Secondary hyperparathyroidism-continue Renvela  8. Hypertension-continue diltiazem

## 2022-05-28 NOTE — PLAN OF CARE
Problem: Adult Inpatient Plan of Care  Goal: Plan of Care Review  Outcome: Ongoing, Progressing  Goal: Patient-Specific Goal (Individualized)  Outcome: Ongoing, Progressing  Goal: Absence of Hospital-Acquired Illness or Injury  Outcome: Ongoing, Progressing  Goal: Optimal Comfort and Wellbeing  Outcome: Ongoing, Progressing     Problem: Communication Impairment (Artificial Airway)  Goal: Effective Communication  Outcome: Ongoing, Progressing     Problem: Device-Related Complication Risk (Artificial Airway)  Goal: Optimal Device Function  Outcome: Ongoing, Progressing     Problem: Skin and Tissue Injury (Artificial Airway)  Goal: Absence of Device-Related Skin or Tissue Injury  Outcome: Ongoing, Progressing     Problem: Skin Injury Risk Increased  Goal: Skin Health and Integrity  Outcome: Ongoing, Progressing     Problem: Impaired Wound Healing  Goal: Optimal Wound Healing  Outcome: Ongoing, Progressing     Problem: Gas Exchange Impaired  Goal: Optimal Gas Exchange  Outcome: Ongoing, Progressing     Problem: Breathing Pattern Ineffective  Goal: Effective Breathing Pattern  Outcome: Ongoing, Progressing

## 2022-05-28 NOTE — PROGRESS NOTES
[facility]  Pulmonology  Progress Note    Patient Name: Og Garcia  MRN: [id number]  Admission Date: 12/23/2021  Hospital Length of Stay: 156 days  Code Status: Prior  Attending Provider: Cecil Abernathy DO  Primary Care Provider: Hector Arndt DNP, FNP-C   Principal Problem: Denice gangrene    Subjective:     Interval History: No acute events overnight. He is currently resting comfortably. Oxygenating adequately on trach collar. He is afebrile this am and vital signs are stable.    Objective:     Vital Signs (Most Recent):  Temp: 98.4 °F (36.9 °C) (05/28/22 0700)  Pulse: 98 (05/28/22 0800)  Resp: (!) 34 (05/28/22 0737)  BP: (!) 109/54 (05/28/22 0800)  SpO2: 100 % (05/28/22 0737)   Vital Signs (24h Range):  Temp:  [97.5 °F (36.4 °C)-100.4 °F (38 °C)] 98.4 °F (36.9 °C)  Pulse:  [] 98  Resp:  [20-52] 34  SpO2:  [92 %-100 %] 100 %  BP: ()/(42-67) 109/54     Weight: 77.5 kg (170 lb 13.7 oz)  Body mass index is 23.83 kg/m².      Intake/Output Summary (Last 24 hours) at 5/28/2022 0953  Last data filed at 5/28/2022 0800  Gross per 24 hour   Intake 3480 ml   Output 1400 ml   Net 2080 ml         Physical Exam    Vents:  Vent Mode: Standby (05/28/22 0415)  Ventilator Initiated: No (04/23/22 1934)  Set Rate: 0 BPM (05/18/22 0516)  Vt Set: 0 mL (05/18/22 0516)  Pressure Support: 15 cmH20 (05/22/22 0044)  PEEP/CPAP: 5 cmH20 (05/22/22 0044)  Oxygen Concentration (%): 35 (05/28/22 0737)  Peak Airway Pressure: 21 cmH2O (05/22/22 0044)  Plateau Pressure: 20 cmH20 (03/07/22 0500)  Total Ve: 7.8 mL (05/22/22 0044)  F/VT Ratio<105 (RSBI): (!) 22.64 (05/22/22 0044)    Lines/Drains/Airways       Central Venous Catheter Line  Duration                  Hemodialysis Catheter 12/30/21 0900 right internal jugular 148 days              Drain  Duration                  Colostomy 12/18/21 1030 Descending/sigmoid  days         Gastrostomy/Enterostomy 03/09/22 1436 Percutaneous endoscopic  gastrostomy (PEG) midline feeding 79 days              Airway  Duration                  Surgical Airway 04/27/22 0856 Marychuy;Other (Comment) Cuffed;Long 31 days              Peripheral Intravenous Line  Duration                  Peripheral IV - Single Lumen 05/26/22 0210 22 G Left;Posterior Hand 2 days         Peripheral IV - Single Lumen 05/27/22 0855 20 G Posterior;Right Hand 1 day                    Significant Labs:    CBC/Anemia Profile:  Recent Labs   Lab 05/27/22  0320   WBC 17.81*   HGB 7.9*   HCT 24.0*   *   MCV 97.6*   RDW 14.3          Chemistries:  Recent Labs   Lab 05/27/22  0320   *   K 4.5   CL 91*   CO2 23   BUN 72*   CREATININE 2.24*   CALCIUM 8.0*         All pertinent labs within the past 24 hours have been reviewed.    Significant Imaging:  I have reviewed all pertinent imaging results/findings within the past 24 hours.    Assessment/Plan:     * Denice gangrene  Stable with no acute issues  He has a wound vac on          Chronic respiratory failure  I am not sure we need downsized this patient's trach I do not believe will ever have his trach removed mental status is stable need start looking for nursing home placement  5/27- He is doing well with trach collar    Pressure ulcer of sacral region, stage 4  Wound care is following the patient  Their help reviewed and appreciated  Now with wound vac    Fever  Patient with elevated temp positive wound cultures back on antibiotics  Had a positive bone culture  Now on merrem and linezolid  When ID returns will ask for their opinion on antibiotic regimen    ESRD (end stage renal disease)  Started on HD 12/30   Continue with HD per nephrology  Now on epogen      Type 2 diabetes mellitus without complication, without long-term current use of insulin  Blood sugar is well controlled  Continue with levemir 25 units and sliding scale    Acute blood loss anemia  No active bleeding  Hgb 6.0 on 4/11  Patient transfused 1 unit with dialysis  yesterday  4/13 -H/H 7.5/22.7  4/15- hgb is 6.2 today. We are going to give another unit of PRBC's today with dialysis  4/16 post transfusion cbc is pending  4/18- hgb is 6.6. Will plan for transfusion with 1 unit of prbc's with next HD  4/21 - 7.2/21.6   4/25- 6.6/19.7 - will transfuse one unit PRBCs with next HD   4/28- Hgb now 8.0 following transfusion  5/2/2022 hemoglobin 7 no need for transfusion yet  5/3/2022 transfuse 1 unit packed red blood cells  05/05/2022 I believe this anemia is probably related to his chronic renal sufficiency a wonder if he is a candidate for Epogen at this point  5/10- transfused 1 unit yesterday  5/12 repeat H&H in am  05/13/2022 in 2 H&H pending  Iron levels are okay will ask Renal about Epogen  5/27- now on epogen  Recent Hgb is 7.9    Hypertension  Blood pressure on lower side- asymptomatic  Continue with current care for now  He is on cardizem 90 mg q6 and metoprolol 12.5 bid both of which were started more for heart rate control                       Cecil Abernathy, DO  Pulmonology  [facility]

## 2022-05-29 NOTE — PLAN OF CARE
Problem: Adult Inpatient Plan of Care  Goal: Plan of Care Review  Outcome: Ongoing, Progressing  Goal: Patient-Specific Goal (Individualized)  Outcome: Ongoing, Progressing  Goal: Absence of Hospital-Acquired Illness or Injury  Outcome: Ongoing, Progressing  Goal: Optimal Comfort and Wellbeing  Outcome: Ongoing, Progressing  Goal: Readiness for Transition of Care  Outcome: Ongoing, Progressing     Problem: Adjustment to Illness (Sepsis/Septic Shock)  Goal: Optimal Coping  Outcome: Ongoing, Progressing     Problem: Bleeding (Sepsis/Septic Shock)  Goal: Absence of Bleeding  Outcome: Ongoing, Progressing     Problem: Glycemic Control Impaired (Sepsis/Septic Shock)  Goal: Blood Glucose Level Within Desired Range  Outcome: Ongoing, Progressing     Problem: Infection Progression (Sepsis/Septic Shock)  Goal: Absence of Infection Signs and Symptoms  Outcome: Ongoing, Progressing     Problem: Nutrition Impaired (Sepsis/Septic Shock)  Goal: Optimal Nutrition Intake  Outcome: Ongoing, Progressing     Problem: Fluid and Electrolyte Imbalance (Acute Kidney Injury/Impairment)  Goal: Fluid and Electrolyte Balance  Outcome: Ongoing, Progressing     Problem: Oral Intake Inadequate (Acute Kidney Injury/Impairment)  Goal: Optimal Nutrition Intake  Outcome: Ongoing, Progressing     Problem: Renal Function Impairment (Acute Kidney Injury/Impairment)  Goal: Effective Renal Function  Outcome: Ongoing, Progressing     Problem: Infection  Goal: Absence of Infection Signs and Symptoms  Outcome: Ongoing, Progressing     Problem: Fall Injury Risk  Goal: Absence of Fall and Fall-Related Injury  Outcome: Ongoing, Progressing     Problem: Communication Impairment (Artificial Airway)  Goal: Effective Communication  Outcome: Ongoing, Progressing     Problem: Device-Related Complication Risk (Artificial Airway)  Goal: Optimal Device Function  Outcome: Ongoing, Progressing     Problem: Skin and Tissue Injury (Artificial Airway)  Goal: Absence of  Device-Related Skin or Tissue Injury  Outcome: Ongoing, Progressing     Problem: Skin Injury Risk Increased  Goal: Skin Health and Integrity  Outcome: Ongoing, Progressing     Problem: Device-Related Complication Risk (Hemodialysis)  Goal: Safe, Effective Therapy Delivery  Outcome: Ongoing, Progressing     Problem: Hemodynamic Instability (Hemodialysis)  Goal: Effective Tissue Perfusion  Outcome: Ongoing, Progressing     Problem: Infection (Hemodialysis)  Goal: Absence of Infection Signs and Symptoms  Outcome: Ongoing, Progressing     Problem: Diabetes Comorbidity  Goal: Blood Glucose Level Within Targeted Range  Outcome: Ongoing, Progressing     Problem: Impaired Wound Healing  Goal: Optimal Wound Healing  Outcome: Ongoing, Progressing     Problem: Gas Exchange Impaired  Goal: Optimal Gas Exchange  Outcome: Ongoing, Progressing     Problem: Breathing Pattern Ineffective  Goal: Effective Breathing Pattern  Outcome: Ongoing, Progressing

## 2022-05-29 NOTE — PROGRESS NOTES
Patient is resting comfortably.  He did open his eyes during the exam.  He fixes and follows.    Physical exam general patient is chronically ill-appearing, heart is regular rate and rhythm, he has no pitting edema, lungs are clear to auscultation anteriorly, abdomen is soft with positive bowel sounds    Assessment/plan 1.  ESRD-continue HD support  2. Sacral wound-continue wound care   3.  Anemia-patient's hematocrit is 24%  4. Diabetes mellitus-continue present hypoglycemic regimen , glucose is 130 on the latest check.  5. Debility  6. Respiratory failure continue trach care   7. Secondary hyperparathyroidism-continue Renvela  8. Hypertension-continue diltiazem

## 2022-05-29 NOTE — PLAN OF CARE
Problem: Adult Inpatient Plan of Care  Goal: Plan of Care Review  Outcome: Ongoing, Progressing  Goal: Patient-Specific Goal (Individualized)  Outcome: Ongoing, Progressing  Goal: Absence of Hospital-Acquired Illness or Injury  Outcome: Ongoing, Progressing  Goal: Optimal Comfort and Wellbeing  Outcome: Ongoing, Progressing     Problem: Communication Impairment (Artificial Airway)  Goal: Effective Communication  Outcome: Ongoing, Progressing     Problem: Device-Related Complication Risk (Artificial Airway)  Goal: Optimal Device Function  Outcome: Ongoing, Progressing     Problem: Skin and Tissue Injury (Artificial Airway)  Goal: Absence of Device-Related Skin or Tissue Injury  Outcome: Ongoing, Progressing     Problem: Gas Exchange Impaired  Goal: Optimal Gas Exchange  Outcome: Ongoing, Progressing     Problem: Breathing Pattern Ineffective  Goal: Effective Breathing Pattern  Outcome: Ongoing, Progressing

## 2022-05-29 NOTE — ASSESSMENT & PLAN NOTE
Patient with elevated temp positive wound cultures back on antibiotics  Had a positive bone culture  Now on merrem and linezolid  When ID returns will ask for their opinion on antibiotic regimen  5/29 currently afebrile

## 2022-05-29 NOTE — ASSESSMENT & PLAN NOTE
Treated anemia   - concern this may be releated to pain and pain medication withdraw - his fentanyl patch was discontinued over the weekend which he has been on for weeks.  -plan - will start PO pain medication q8hrs then decrease to q12 hours after 7 days.   4/8 - improving   4/9-heart rate up on exam.  Secondary to pain.  Will treat p.r.n. pain medication.  4/14- had some SVT yesterday that improved with treating his pain and lopressor  4/22- HR in 110's on ditalizem - will add low dose BB   4/25- HR improved - BB was held due to HD this morning   4/28- HR around 100 today  5/27- HR low 100's this am---patient is on cardizem and metoprolol  5/29- Heart rate is doing well----low 90's during my exam

## 2022-05-29 NOTE — PLAN OF CARE
Problem: Adult Inpatient Plan of Care  Goal: Plan of Care Review  Outcome: Ongoing, Progressing  Goal: Optimal Comfort and Wellbeing  Outcome: Ongoing, Progressing     Problem: Bleeding (Sepsis/Septic Shock)  Goal: Absence of Bleeding  Outcome: Ongoing, Progressing     Problem: Glycemic Control Impaired (Sepsis/Septic Shock)  Goal: Blood Glucose Level Within Desired Range  Outcome: Ongoing, Progressing     Problem: Fluid and Electrolyte Imbalance (Acute Kidney Injury/Impairment)  Goal: Fluid and Electrolyte Balance  Outcome: Ongoing, Progressing     Problem: Adult Inpatient Plan of Care  Goal: Absence of Hospital-Acquired Illness or Injury  Outcome: Ongoing, Not Progressing  Goal: Readiness for Transition of Care  Outcome: Ongoing, Not Progressing     Problem: Infection Progression (Sepsis/Septic Shock)  Goal: Absence of Infection Signs and Symptoms  Outcome: Ongoing, Not Progressing

## 2022-05-29 NOTE — PROGRESS NOTES
[facility]  Pulmonology  Progress Note    Patient Name: Og Garcia  MRN: [id number]  Admission Date: 12/23/2021  Hospital Length of Stay: 157 days  Code Status: Prior  Attending Provider: Cecil Abernathy DO  Primary Care Provider: Hector Arndt DNP, FNP-C   Principal Problem: Denice gangrene    Subjective:     Interval History: No acute events overnight. He is currently resting comfortably. Oxygenating adequately on trach collar. He is afebrile this am and vital signs are stable.    Objective:     Vital Signs (Most Recent):  Temp: 98.7 °F (37.1 °C) (05/29/22 0700)  Pulse: 93 (05/29/22 0823)  Resp: (!) 35 (05/29/22 0645)  BP: (!) 131/46 (05/29/22 0823)  SpO2: 100 % (05/29/22 0645)   Vital Signs (24h Range):  Temp:  [35.6 °F (2 °C)-100.3 °F (37.9 °C)] 98.7 °F (37.1 °C)  Pulse:  [] 93  Resp:  [26-45] 35  SpO2:  [94 %-100 %] 100 %  BP: ()/(30-69) 131/46     Weight: 76.1 kg (167 lb 12.3 oz)  Body mass index is 23.4 kg/m².      Intake/Output Summary (Last 24 hours) at 5/29/2022 0942  Last data filed at 5/29/2022 0758  Gross per 24 hour   Intake 3375 ml   Output 200 ml   Net 3175 ml         Physical Exam    Vents:  Vent Mode: Standby (05/28/22 0415)  Ventilator Initiated: No (04/23/22 1934)  Set Rate: 0 BPM (05/18/22 0516)  Vt Set: 0 mL (05/18/22 0516)  Pressure Support: 15 cmH20 (05/22/22 0044)  PEEP/CPAP: 5 cmH20 (05/22/22 0044)  Oxygen Concentration (%): 35 (05/29/22 0600)  Peak Airway Pressure: 21 cmH2O (05/22/22 0044)  Plateau Pressure: 20 cmH20 (03/07/22 0500)  Total Ve: 7.8 mL (05/22/22 0044)  F/VT Ratio<105 (RSBI): (!) 22.64 (05/22/22 0044)    Lines/Drains/Airways       Central Venous Catheter Line  Duration                  Hemodialysis Catheter 12/30/21 0900 right internal jugular 149 days              Drain  Duration                  Colostomy 12/18/21 1030 Descending/sigmoid  days         Gastrostomy/Enterostomy 03/09/22 1436 Percutaneous endoscopic  gastrostomy (PEG) midline feeding 80 days              Airway  Duration                  Surgical Airway 04/27/22 0856 Marychuy;Other (Comment) Cuffed;Long 32 days              Peripheral Intravenous Line  Duration                  Peripheral IV - Single Lumen 05/26/22 0210 22 G Left;Posterior Hand 3 days         Peripheral IV - Single Lumen 05/27/22 0855 20 G Posterior;Right Hand 2 days                    Significant Labs:    CBC/Anemia Profile:  No results for input(s): WBC, HGB, HCT, PLT, MCV, RDW, IRON, FERRITIN, RETIC, FOLATE, [id number], OCCULTBLOOD in the last 48 hours.       Chemistries:  No results for input(s): NA, K, CL, CO2, BUN, CREATININE, CALCIUM, ALBUMIN, PROT, BILITOT, ALKPHOS, ALT, AST, GLUCOSE, MG, PHOS in the last 48 hours.      All pertinent labs within the past 24 hours have been reviewed.    Significant Imaging:  I have reviewed all pertinent imaging results/findings within the past 24 hours.    Assessment/Plan:     * Denice gangrene  Stable with no acute issues  He has a wound vac on          Chronic respiratory failure  I am not sure we need downsized this patient's trach I do not believe will ever have his trach removed mental status is stable need start looking for nursing home placement  5/29- He is doing well with trach collar    Tachycardia  Treated anemia   - concern this may be releated to pain and pain medication withdraw - his fentanyl patch was discontinued over the weekend which he has been on for weeks.  -plan - will start PO pain medication q8hrs then decrease to q12 hours after 7 days.   4/8 - improving   4/9-heart rate up on exam.  Secondary to pain.  Will treat p.r.n. pain medication.  4/14- had some SVT yesterday that improved with treating his pain and lopressor  4/22- HR in 110's on ditalizem - will add low dose BB   4/25- HR improved - BB was held due to HD this morning   4/28- HR around 100 today  5/27- HR low 100's this am---patient is on cardizem and metoprolol  5/29-  Heart rate is doing well----low 90's during my exam    Pressure ulcer of sacral region, stage 4  Wound care is following the patient  Their help reviewed and appreciated  Now with wound vac    Fever  Patient with elevated temp positive wound cultures back on antibiotics  Had a positive bone culture  Now on merrem and linezolid  When ID returns will ask for their opinion on antibiotic regimen  5/29 currently afebrile    ESRD (end stage renal disease)  Started on HD 12/30   Continue with HD per nephrology  Now on epogen      Type 2 diabetes mellitus without complication, without long-term current use of insulin  Blood sugar is well controlled  Continue with levemir 25 units and sliding scale    Acute blood loss anemia  No active bleeding  Hgb 6.0 on 4/11  Patient transfused 1 unit with dialysis yesterday  4/13 -H/H 7.5/22.7  4/15- hgb is 6.2 today. We are going to give another unit of PRBC's today with dialysis  4/16 post transfusion cbc is pending  4/18- hgb is 6.6. Will plan for transfusion with 1 unit of prbc's with next HD  4/21 - 7.2/21.6   4/25- 6.6/19.7 - will transfuse one unit PRBCs with next HD   4/28- Hgb now 8.0 following transfusion  5/2/2022 hemoglobin 7 no need for transfusion yet  5/3/2022 transfuse 1 unit packed red blood cells  05/05/2022 I believe this anemia is probably related to his chronic renal sufficiency a wonder if he is a candidate for Epogen at this point  5/10- transfused 1 unit yesterday  5/12 repeat H&H in am  05/13/2022 in 2 H&H pending  Iron levels are okay will ask Renal about Epogen  5/27- now on epogen  Recent Hgb is 7.9    Hypertension  Blood pressure on lower side- asymptomatic  Continue with current care for now  He is on cardizem 90 mg q6 and metoprolol 12.5 bid both of which were started more for heart rate control                       Cecil Abernathy, DO  Pulmonology  [facility]

## 2022-05-29 NOTE — SUBJECTIVE & OBJECTIVE
Interval History: No acute events overnight. He is currently resting comfortably. Oxygenating adequately on trach collar. He is afebrile this am and vital signs are stable.    Objective:     Vital Signs (Most Recent):  Temp: 98.7 °F (37.1 °C) (05/29/22 0700)  Pulse: 93 (05/29/22 0823)  Resp: (!) 35 (05/29/22 0645)  BP: (!) 131/46 (05/29/22 0823)  SpO2: 100 % (05/29/22 0645)   Vital Signs (24h Range):  Temp:  [35.6 °F (2 °C)-100.3 °F (37.9 °C)] 98.7 °F (37.1 °C)  Pulse:  [] 93  Resp:  [26-45] 35  SpO2:  [94 %-100 %] 100 %  BP: ()/(30-69) 131/46     Weight: 76.1 kg (167 lb 12.3 oz)  Body mass index is 23.4 kg/m².      Intake/Output Summary (Last 24 hours) at 5/29/2022 0942  Last data filed at 5/29/2022 0758  Gross per 24 hour   Intake 3375 ml   Output 200 ml   Net 3175 ml         Physical Exam    Vents:  Vent Mode: Standby (05/28/22 0415)  Ventilator Initiated: No (04/23/22 1934)  Set Rate: 0 BPM (05/18/22 0516)  Vt Set: 0 mL (05/18/22 0516)  Pressure Support: 15 cmH20 (05/22/22 0044)  PEEP/CPAP: 5 cmH20 (05/22/22 0044)  Oxygen Concentration (%): 35 (05/29/22 0600)  Peak Airway Pressure: 21 cmH2O (05/22/22 0044)  Plateau Pressure: 20 cmH20 (03/07/22 0500)  Total Ve: 7.8 mL (05/22/22 0044)  F/VT Ratio<105 (RSBI): (!) 22.64 (05/22/22 0044)    Lines/Drains/Airways       Central Venous Catheter Line  Duration                  Hemodialysis Catheter 12/30/21 0900 right internal jugular 149 days              Drain  Duration                  Colostomy 12/18/21 1030 Descending/sigmoid  days         Gastrostomy/Enterostomy 03/09/22 1436 Percutaneous endoscopic gastrostomy (PEG) midline feeding 80 days              Airway  Duration                  Surgical Airway 04/27/22 0856 Marychuy;Other (Comment) Cuffed;Long 32 days              Peripheral Intravenous Line  Duration                  Peripheral IV - Single Lumen 05/26/22 0210 22 G Left;Posterior Hand 3 days         Peripheral IV - Single Lumen 05/27/22 0855  20 G Posterior;Right Hand 2 days                    Significant Labs:    CBC/Anemia Profile:  No results for input(s): WBC, HGB, HCT, PLT, MCV, RDW, IRON, FERRITIN, RETIC, FOLATE, [id number], OCCULTBLOOD in the last 48 hours.       Chemistries:  No results for input(s): NA, K, CL, CO2, BUN, CREATININE, CALCIUM, ALBUMIN, PROT, BILITOT, ALKPHOS, ALT, AST, GLUCOSE, MG, PHOS in the last 48 hours.      All pertinent labs within the past 24 hours have been reviewed.    Significant Imaging:  I have reviewed all pertinent imaging results/findings within the past 24 hours.

## 2022-05-29 NOTE — ASSESSMENT & PLAN NOTE
I am not sure we need downsized this patient's trach I do not believe will ever have his trach removed mental status is stable need start looking for nursing home placement  5/29- He is doing well with trach collar

## 2022-05-30 NOTE — SUBJECTIVE & OBJECTIVE
Interval History: No acute events overnight. He is currently resting comfortably. Oxygenating adequately on trach collar. He is afebrile this am and vital signs are stable.    Objective:     Vital Signs (Most Recent):  Temp: 99 °F (37.2 °C) (05/30/22 0300)  Pulse: 97 (05/30/22 0709)  Resp: 12 (05/30/22 0709)  BP: (!) 107/56 (05/30/22 0937)  SpO2: 96 % (05/30/22 0709)   Vital Signs (24h Range):  Temp:  [97.7 °F (36.5 °C)-100.9 °F (38.3 °C)] 99 °F (37.2 °C)  Pulse:  [] 97  Resp:  [12-42] 12  SpO2:  [82 %-100 %] 96 %  BP: ()/(37-63) 107/56     Weight: 78.2 kg (172 lb 6.4 oz)  Body mass index is 24.04 kg/m².      Intake/Output Summary (Last 24 hours) at 5/30/2022 0955  Last data filed at 5/30/2022 0733  Gross per 24 hour   Intake 3580 ml   Output 280 ml   Net 3300 ml         Physical Exam    Vents:  Vent Mode: Standby (05/28/22 0415)  Ventilator Initiated: No (04/23/22 1934)  Set Rate: 0 BPM (05/18/22 0516)  Vt Set: 0 mL (05/18/22 0516)  Pressure Support: 15 cmH20 (05/22/22 0044)  PEEP/CPAP: 5 cmH20 (05/22/22 0044)  Oxygen Concentration (%): 35 (05/30/22 0709)  Peak Airway Pressure: 21 cmH2O (05/22/22 0044)  Plateau Pressure: 20 cmH20 (03/07/22 0500)  Total Ve: 7.8 mL (05/22/22 0044)  F/VT Ratio<105 (RSBI): (!) 22.64 (05/22/22 0044)    Lines/Drains/Airways       Central Venous Catheter Line  Duration                  Hemodialysis Catheter 12/30/21 0900 right internal jugular 150 days              Drain  Duration                  Colostomy 12/18/21 1030 Descending/sigmoid  days         Gastrostomy/Enterostomy 03/09/22 1436 Percutaneous endoscopic gastrostomy (PEG) midline feeding 81 days              Airway  Duration                  Surgical Airway 04/27/22 0856 Marychuy;Other (Comment) Cuffed;Long 33 days              Peripheral Intravenous Line  Duration                  Peripheral IV - Single Lumen 05/26/22 0210 22 G Left;Posterior Hand 4 days         Peripheral IV - Single Lumen 05/27/22 0855 20 G  Posterior;Right Hand 3 days                    Significant Labs:    CBC/Anemia Profile:  Recent Labs   Lab 05/30/22  0306   WBC 18.48*   HGB 6.8*   HCT 21.3*   *   .0*   RDW 14.3          Chemistries:  Recent Labs   Lab 05/30/22  0708   *   K 3.8   CL 93*   CO2 23   BUN 76*   CREATININE 2.13*   CALCIUM 8.9         All pertinent labs within the past 24 hours have been reviewed.    Significant Imaging:  I have reviewed all pertinent imaging results/findings within the past 24 hours.

## 2022-05-30 NOTE — PLAN OF CARE
Problem: Adult Inpatient Plan of Care  Goal: Plan of Care Review  Outcome: Ongoing, Progressing  Goal: Patient-Specific Goal (Individualized)  Outcome: Ongoing, Progressing  Goal: Absence of Hospital-Acquired Illness or Injury  Outcome: Ongoing, Progressing  Goal: Optimal Comfort and Wellbeing  Outcome: Ongoing, Progressing     Problem: Communication Impairment (Artificial Airway)  Goal: Effective Communication  Outcome: Ongoing, Progressing     Problem: Device-Related Complication Risk (Artificial Airway)  Goal: Optimal Device Function  Outcome: Ongoing, Progressing     Problem: Skin and Tissue Injury (Artificial Airway)  Goal: Absence of Device-Related Skin or Tissue Injury  Outcome: Ongoing, Progressing     Problem: Skin Injury Risk Increased  Goal: Skin Health and Integrity  Outcome: Ongoing, Progressing     Problem: Gas Exchange Impaired  Goal: Optimal Gas Exchange  Outcome: Ongoing, Progressing     Problem: Breathing Pattern Ineffective  Goal: Effective Breathing Pattern  Outcome: Ongoing, Progressing

## 2022-05-30 NOTE — PROGRESS NOTES
Patient is resting comfortably.     Physical exam general patient is chronically ill-appearing, heart is regular rate and rhythm, he has no pitting edema, lungs are clear to auscultation anteriorly, abdomen is soft with positive bowel sounds    Assessment/plan 1.  ESRD-continue HD support  2. Sacral wound-continue wound care   3.  Anemia-patient's hematocrit is 21% this morning  4. Diabetes mellitus-continue present hypoglycemic regimen  5. Debility  6. Respiratory failure continue trach care   7. Secondary hyperparathyroidism-continue Renvela  8. Hypertension-continue diltiazem

## 2022-05-30 NOTE — PROGRESS NOTES
[facility]  Adult Nutrition  Follow-up Note         Reason for Assessment  Reason For Assessment: RD follow-up  Nutrition Risk Screen: tube feeding or parenteral nutrition, large or nonhealing wound, burn or pressure injury  Malnutrition  Is Patient Malnourished: No  Nutrition Diagnosis  Increased protein Needs   related to Decreased/ impaired skin integrity as evidenced by wounds    Nutrition Diagnosis Status: Improving      Nutrition Risk  Level of Risk/Frequency of Follow-up: high   Chewing or Swallowing Difficulty?: Swallowing difficulty  Estimated/Assessed Needs  RMR (Dearing-St. Jeor Equation): 1579.13 Activity Factor: 1 Injury Factor: 1.2   Total Ve: 7.8 mL Temp: 99.3 °F (37.4 °C)Axillary  Weight Used For Calorie Calculations: 78.3 kg (172 lb 9.9 oz)   Energy Need Method: [facility] Energy Calorie Requirements (kcal): 1608  Weight Used For Protein Calculations: 73.6 kg (162 lb 4.1 oz)  Protein Requirements:   Estimated Fluid Requirement Method: RDA Method Fluid Requirements (mL): 2105  RDA Method (mL): 1608     Nutrition Prescription / Recommendations  Recommendation/Intervention: Tube feeding: Nepro @ 60ml/hr; H20 flush of 50ml q 4hr  Goals: pt will meet est nutr needs, wound healing, wt maintenance  Nutrition Goal Status: progressing towards goal  Communication of RD Recs: discussed on rounds  Current Diet Order: Tube feeding: Nepro @ 60ml/hr; H20 flush of 50ml q 4hr  Nutrition Order Comments: PEG tube feeding:Nepro @ 60ml/hr; H20 flush of 50ml q 4hr  Current Nutrition Support Formula Ordered: Nepro  Current Nutrition Support Rate Ordered: 60 (ml)  Current Nutrition Support Frequency Ordered: hourly  Recommended Diet: Enteral Nutrition Tube feeding: Nepro @ 60ml/hr; H20 flush of 50ml q 4hr  Recommended Oral Supplement: Matthew [90 kcals, 2.5g Protein, 10g Carbs(3g Sugar), 7g L-Arginine, 7g L-Glutamine, Vitamin C 300mg, 9.5mg Zinc] twice daily  Is Nutrition Support Recommended:  No  Is Education Recommended: No  Monitor and Evaluation  % current Intake: Enteral Nutrition at goal  % intake to meet estimated needs: Enteral Nutrition   Food and Nutrient Intake: enteral nutrition intake  Food and Nutrient Adminstration: enteral and parenteral nutrition administration  Anthropometric Measurements: height/length, weight, weight change, body mass index  Biochemical Data, Medical Tests and Procedures: electrolyte and renal panel, glucose/endocrine profile  Nutrition-Focused Physical Findings: skin  Enteral Calories (kcal): 2592  Enteral Protein (gm): 115  Enteral (Free Water) Fluid (mL): 1037  Free Water Flush Fluid (mL): 300  Parenteral Calories (kcal): 845  Parenteral Protein (gm): 48  Parenteral Fluid (mL): 960  Lipid Calories (kcals): 500 kcals  Other Calories (kcal): 528 (propofol)  Total Calories (kcal): 2160  Total Calories (kcal/kg): 1337  % Kcal Needs: 100  Total Protein (gm): 135  % Protein Needs: 100  IV Fluid (mL): 1337  Total Fluid Intake (mL): 1337  Energy Calories Required: meeting needs  Protein Required: meeting needs  Fluid Required: meeting needs  Tolerance: tolerating  Current Medical Diagnosis and Past Medical History  Diagnosis: renal disease, gastrointestinal disease, infection/sepsis  Past Medical History:   Diagnosis Date    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    Hyperlipidemia     Hypertension     On mechanically assisted ventilation 12/14/2021    Pressure ulcer of left heel, unstageable      Nutrition/Diet History  Spiritual, Cultural Beliefs, [employer]ian Practices, Values that Affect Care: no  Supplemental Drinks or Food Habits: Matthew, Nepro  Food Allergies: NKFA  Factors Affecting Nutritional Intake: None identified at this time  Lab/Procedures/Meds  Recent Labs   Lab 05/30/22  0708   *   K 3.8   BUN 76*   CREATININE 2.13*   GLU 64*   CALCIUM 8.9   CL 93*     Last A1c: No results found for: HGBA1C  Lab Results   Component Value Date    RBC 2.13 (L) 05/30/2022  "   HGB 6.8 (L) 05/30/2022    HCT 21.3 (L) 05/30/2022    .0 (H) 05/30/2022    MCH 31.9 (H) 05/30/2022    MCHC 31.9 (L) 05/30/2022    TIBC 128 (L) 05/24/2022     Pertinent Labs Reviewed: reviewed  Pertinent Labs Comments: na 133, k 3.7, crea 2.85, GFR 24  Pertinent Medications Reviewed: reviewed  Pertinent Medications Comments: heparin, insulin  Anthropometrics  Temp: 99.3 °F (37.4 °C)  Height Method: Stated  Height: 5' 11" (180.3 cm)  Height (inches): 71 in  Weight Method: Bed Scale  Weight: 78.2 kg (172 lb 6.4 oz)  Weight (lb): 172.4 lb  Ideal Body Weight (IBW), Male: 172 lb  % Ideal Body Weight, Male (lb): 94.34 %  BMI (Calculated): 24.1  BMI Grade: 18.5-24.9 - normal     Nutrition by Nursing  Diet/Nutrition Received: tube feeding  Intake (%): 100%  Diet/Feeding Assistance: total feed  Diet/Feeding Tolerance: good  Last Bowel Movement: 05/30/22  [REMOVED]      NG/OG Tube Box Elder sump 18 Fr. Left nostril-Feeding Type: continuous, by pump       Gastrostomy/Enterostomy 03/09/22 1436 Percutaneous endoscopic gastrostomy (PEG) midline feeding-Feeding Type: continuous, by pump  [REMOVED]      NG/OG Tube Box Elder sump 18 Fr. Left nostril-Current Rate (mL/hr): 50 mL/hr       Gastrostomy/Enterostomy 03/09/22 1436 Percutaneous endoscopic gastrostomy (PEG) midline feeding-Current Rate (mL/hr): 60 mL/hr  [REMOVED]      NG/OG Tube Box Elder sump 18 Fr. Left nostril-Goal Rate (mL/hr): 75 mL/hr       Gastrostomy/Enterostomy 03/09/22 1436 Percutaneous endoscopic gastrostomy (PEG) midline feeding-Goal Rate (mL/hr): 60 mL/hr  [REMOVED]      NG/OG Tube Box Elder sump 18 Fr. Left nostril-Formula Name: nepro 1.8       Gastrostomy/Enterostomy 03/09/22 1436 Percutaneous endoscopic gastrostomy (PEG) midline feeding-Formula Name: nepro  Nutrition Follow-Up  RD Follow-up?: Yes  Assessment and Plan  No new Assessment & Plan notes have been filed under this hospital service since the last note was generated.  Service: Nutrition     "

## 2022-05-30 NOTE — ASSESSMENT & PLAN NOTE
Patient with elevated temp positive wound cultures back on antibiotics  Had a positive bone culture  Now on merrem and linezolid  When ID returns will ask for their opinion on antibiotic regimen  5/30 currently afebrile

## 2022-05-30 NOTE — ASSESSMENT & PLAN NOTE
I am not sure we need downsized this patient's trach I do not believe will ever have his trach removed mental status is stable need start looking for nursing home placement  5/30- He is doing well with continuouos trach collar

## 2022-05-30 NOTE — ASSESSMENT & PLAN NOTE
No active bleeding  Hgb 6.0 on 4/11  Patient transfused 1 unit with dialysis yesterday  4/13 -H/H 7.5/22.7  4/15- hgb is 6.2 today. We are going to give another unit of PRBC's today with dialysis  4/16 post transfusion cbc is pending  4/18- hgb is 6.6. Will plan for transfusion with 1 unit of prbc's with next HD  4/21 - 7.2/21.6   4/25- 6.6/19.7 - will transfuse one unit PRBCs with next HD   4/28- Hgb now 8.0 following transfusion  5/2/2022 hemoglobin 7 no need for transfusion yet  5/3/2022 transfuse 1 unit packed red blood cells  05/05/2022 I believe this anemia is probably related to his chronic renal sufficiency a wonder if he is a candidate for Epogen at this point  5/10- transfused 1 unit yesterday  5/12 repeat H&H in am  05/13/2022 in 2 H&H pending  Iron levels are okay will ask Renal about Epogen  5/27- now on epogen  Recent Hgb is 6.8  Can transfuse 1 unit with next HD

## 2022-05-30 NOTE — PROGRESS NOTES
[facility]  Pulmonology  Progress Note    Patient Name: Og Garcia  MRN: [id number]  Admission Date: 12/23/2021  Hospital Length of Stay: 158 days  Code Status: Prior  Attending Provider: Cecil Abernathy DO  Primary Care Provider: Hector Arndt DNP, FNP-C   Principal Problem: Denice gangrene    Subjective:     Interval History: No acute events overnight. He is currently resting comfortably. Oxygenating adequately on trach collar. He is afebrile this am and vital signs are stable.    Objective:     Vital Signs (Most Recent):  Temp: 99 °F (37.2 °C) (05/30/22 0300)  Pulse: 97 (05/30/22 0709)  Resp: 12 (05/30/22 0709)  BP: (!) 107/56 (05/30/22 0937)  SpO2: 96 % (05/30/22 0709)   Vital Signs (24h Range):  Temp:  [97.7 °F (36.5 °C)-100.9 °F (38.3 °C)] 99 °F (37.2 °C)  Pulse:  [] 97  Resp:  [12-42] 12  SpO2:  [82 %-100 %] 96 %  BP: ()/(37-63) 107/56     Weight: 78.2 kg (172 lb 6.4 oz)  Body mass index is 24.04 kg/m².      Intake/Output Summary (Last 24 hours) at 5/30/2022 0955  Last data filed at 5/30/2022 0733  Gross per 24 hour   Intake 3580 ml   Output 280 ml   Net 3300 ml         Physical Exam    Vents:  Vent Mode: Standby (05/28/22 0415)  Ventilator Initiated: No (04/23/22 1934)  Set Rate: 0 BPM (05/18/22 0516)  Vt Set: 0 mL (05/18/22 0516)  Pressure Support: 15 cmH20 (05/22/22 0044)  PEEP/CPAP: 5 cmH20 (05/22/22 0044)  Oxygen Concentration (%): 35 (05/30/22 0709)  Peak Airway Pressure: 21 cmH2O (05/22/22 0044)  Plateau Pressure: 20 cmH20 (03/07/22 0500)  Total Ve: 7.8 mL (05/22/22 0044)  F/VT Ratio<105 (RSBI): (!) 22.64 (05/22/22 0044)    Lines/Drains/Airways       Central Venous Catheter Line  Duration                  Hemodialysis Catheter 12/30/21 0900 right internal jugular 150 days              Drain  Duration                  Colostomy 12/18/21 1030 Descending/sigmoid  days         Gastrostomy/Enterostomy 03/09/22 1436 Percutaneous endoscopic gastrostomy  (PEG) midline feeding 81 days              Airway  Duration                  Surgical Airway 04/27/22 0856 Marychuy;Other (Comment) Cuffed;Long 33 days              Peripheral Intravenous Line  Duration                  Peripheral IV - Single Lumen 05/26/22 0210 22 G Left;Posterior Hand 4 days         Peripheral IV - Single Lumen 05/27/22 0855 20 G Posterior;Right Hand 3 days                    Significant Labs:    CBC/Anemia Profile:  Recent Labs   Lab 05/30/22  0306   WBC 18.48*   HGB 6.8*   HCT 21.3*   *   .0*   RDW 14.3          Chemistries:  Recent Labs   Lab 05/30/22  0708   *   K 3.8   CL 93*   CO2 23   BUN 76*   CREATININE 2.13*   CALCIUM 8.9         All pertinent labs within the past 24 hours have been reviewed.    Significant Imaging:  I have reviewed all pertinent imaging results/findings within the past 24 hours.    Assessment/Plan:     * Denice gangrene  Stable with no acute issues  He has a wound vac on          Chronic respiratory failure  I am not sure we need downsized this patient's trach I do not believe will ever have his trach removed mental status is stable need start looking for nursing home placement  5/30- He is doing well with continuouos trach collar    Tachycardia  Treated anemia   - concern this may be releated to pain and pain medication withdraw - his fentanyl patch was discontinued over the weekend which he has been on for weeks.  -plan - will start PO pain medication q8hrs then decrease to q12 hours after 7 days.   4/8 - improving   4/9-heart rate up on exam.  Secondary to pain.  Will treat p.r.n. pain medication.  4/14- had some SVT yesterday that improved with treating his pain and lopressor  4/22- HR in 110's on ditalizem - will add low dose BB   4/25- HR improved - BB was held due to HD this morning   4/28- HR around 100 today  5/27- HR low 100's this am---patient is on cardizem and metoprolol  5/29- Heart rate is doing well----low 90's during my  exam    Pressure ulcer of sacral region, stage 4  Wound care is following the patient  Their help reviewed and appreciated  Now with wound vac    Fever  Patient with elevated temp positive wound cultures back on antibiotics  Had a positive bone culture  Now on merrem and linezolid  When ID returns will ask for their opinion on antibiotic regimen  5/30 currently afebrile    ESRD (end stage renal disease)  Started on HD 12/30   Continue with HD per nephrology  Now on epogen      Type 2 diabetes mellitus without complication, without long-term current use of insulin  Blood sugar is well controlled  Continue with levemir 25 units and sliding scale    Acute blood loss anemia  No active bleeding  Hgb 6.0 on 4/11  Patient transfused 1 unit with dialysis yesterday  4/13 -H/H 7.5/22.7  4/15- hgb is 6.2 today. We are going to give another unit of PRBC's today with dialysis  4/16 post transfusion cbc is pending  4/18- hgb is 6.6. Will plan for transfusion with 1 unit of prbc's with next HD  4/21 - 7.2/21.6   4/25- 6.6/19.7 - will transfuse one unit PRBCs with next HD   4/28- Hgb now 8.0 following transfusion  5/2/2022 hemoglobin 7 no need for transfusion yet  5/3/2022 transfuse 1 unit packed red blood cells  05/05/2022 I believe this anemia is probably related to his chronic renal sufficiency a wonder if he is a candidate for Epogen at this point  5/10- transfused 1 unit yesterday  5/12 repeat H&H in am  05/13/2022 in 2 H&H pending  Iron levels are okay will ask Renal about Epogen  5/27- now on epogen  Recent Hgb is 6.8  Can transfuse 1 unit with next HD    Hypertension  Blood pressure on lower side- asymptomatic  Continue with current care for now  He is on cardizem 90 mg q6 and metoprolol 12.5 bid both of which were started more for heart rate control                       Cecil Abernathy, DO  Pulmonology  [facility]

## 2022-05-30 NOTE — PROGRESS NOTES
Pharmacy dosing tobramycin for treatment of bone/joint infection. Random level this am is within goal of <2. Give tobra 220mg x one today. Random level ordered for 6/3/22 at 0400. Pharmacy will continue to follow and dose.

## 2022-05-31 NOTE — PLAN OF CARE
Problem: Adult Inpatient Plan of Care  Goal: Plan of Care Review  5/31/2022 1502 by Shanique Mantilla RN  Outcome: Ongoing, Progressing  5/31/2022 1501 by Shanique Mantilla RN  Outcome: Ongoing, Progressing  Goal: Patient-Specific Goal (Individualized)  5/31/2022 1502 by Shanique Mantilla RN  Outcome: Ongoing, Progressing  5/31/2022 1501 by Shanique Mantilla RN  Outcome: Ongoing, Progressing  Goal: Absence of Hospital-Acquired Illness or Injury  5/31/2022 1502 by Shanique Mantilla RN  Outcome: Ongoing, Progressing  5/31/2022 1501 by Shanique Mantilla RN  Outcome: Ongoing, Progressing  Goal: Optimal Comfort and Wellbeing  5/31/2022 1502 by Shanique Mantilla RN  Outcome: Ongoing, Progressing  5/31/2022 1501 by Shanique Mantilla RN  Outcome: Ongoing, Progressing  Goal: Readiness for Transition of Care  5/31/2022 1502 by Shanique Mantilla RN  Outcome: Ongoing, Progressing  5/31/2022 1501 by Shanique Mantilla RN  Outcome: Ongoing, Progressing

## 2022-05-31 NOTE — PROGRESS NOTES
[facility]  Nephrology  Progress Note    Patient Name: Og Garcia  MRN: [id number]  Admission Date: 12/23/2021  Hospital Length of Stay: 159 days  Attending Provider: Cecil Abernathy DO   Primary Care Physician: Hector Arndt DNP, FNP-C  Principal Problem:Denice gangrene    Subjective:     HPI: The patient is known from the previous nephrology consult at [facility].  He is no at Specialty and continues to need dialysis support.      Interval History: The patient is resting.  He is on trach collar and is doing acceptable.      Review of patient's allergies indicates:  No Known Allergies  Current Facility-Administered Medications   Medication Frequency    0.9%  NaCl infusion (for blood administration) Q24H PRN    0.9%  NaCl infusion PRN    acetaminophen tablet 500 mg Q6H PRN    albuterol nebulizer solution 2.5 mg Q4H PRN    albuterol-ipratropium 2.5 mg-0.5 mg/3 mL nebulizer solution 3 mL Q6H    bisacodyL EC tablet 10 mg Daily PRN    collagenase ointment Daily PRN    dextrose 50% injection 12.5 g PRN    diltiaZEM tablet 90 mg Q6H    epoetin beth-epbx injection 10,000 Units Every Mon, Wed, Fri    glucagon (human recombinant) injection 1 mg PRN    guaiFENesin 100 mg/5 ml syrup 200 mg Q6H    heparin (porcine) injection 4,000 Units PRN    heparin (porcine) injection 5,000 Units Q8H    insulin aspart U-100 injection 1-10 Units Q6H    insulin detemir U-100 injection 25 Units QHS    linezolid 600 mg/300 mL IVPB 600 mg Q12H    meropenem (MERREM) 500 mg in sodium chloride 0.9 % 100 mL IVPB (MB+) Q24H    metoprolol tartrate (LOPRESSOR) split tablet 12.5 mg BID    pantoprazole suspension 40 mg Daily    sevelamer carbonate pwpk 0.8 g TID WM    simethicone chewable tablet 80 mg TID PRN    sodium chloride 0.9% bolus 250 mL PRN    tobramycin - pharmacy to dose pharmacy to manage frequency       Objective:     Vital Signs (Most Recent):  Temp: 99.6 °F (37.6 °C) (05/31/22  0800)  Pulse: 89 (05/31/22 1330)  Resp: (!) 35 (05/31/22 1330)  BP: (!) 130/52 (05/31/22 0800)  SpO2: 100 % (05/31/22 1330)  O2 Device (Oxygen Therapy): Trach Collar (05/31/22 0759)   Vital Signs (24h Range):  Temp:  [99.3 °F (37.4 °C)-99.6 °F (37.6 °C)] 99.6 °F (37.6 °C)  Pulse:  [] 89  Resp:  [21-41] 35  SpO2:  [98 %-100 %] 100 %  BP: (105-136)/(49-65) 130/52     Weight: 77 kg (169 lb 12.1 oz) (05/31/22 0600)  Body mass index is 23.68 kg/m².  Body surface area is 1.96 meters squared.    I/O last 3 completed shifts:  In: 3500 [P.O.:240; Blood:300; NG/GT:2160; IV Piggyback:800]  Out: 1880 [Other:1430; Stool:450]    Physical Exam  Vitals reviewed.   HENT:      Head: Normocephalic.      Mouth/Throat:      Mouth: Mucous membranes are moist.   Cardiovascular:      Rate and Rhythm: Regular rhythm.   Pulmonary:      Effort: Pulmonary effort is normal.   Abdominal:      Palpations: Abdomen is soft.   Neurological:      Mental Status: He is alert.       Significant Labs:  BMP:   Recent Labs   Lab 05/30/22  0708   GLU 64*   *   K 3.8   CL 93*   CO2 23   BUN 76*   CREATININE 2.13*   CALCIUM 8.9     CBC:   Recent Labs   Lab 05/30/22  0306   WBC 18.48*   RBC 2.13*   HGB 6.8*   HCT 21.3*   *   .0*   MCH 31.9*   MCHC 31.9*        Significant Imaging:  Labs: Reviewed    Assessment/Plan:     ESRD (end stage renal disease)  5/23/2022Dialysis MWF  5/24/2022  Continue with dialysis as scheduled.  5/25/2022  Dialysis session is going well.  Continue current support.  5/26/2022  Continue with dialysis .  5/27/2022  Dialysis as scheduled on MWF.   5/31/2022  Continue with scheduled hemodialysis for this patient.    Hypertension    5/23/2022 Blood pressure has been stable.  5/31/2022 The patient's blood pressure is stable.         Thank you for your consult. I will follow-up with patient. Please contact us if you have any additional questions.    Edwin Pryor Jr, MD  Nephrology  [facility]

## 2022-05-31 NOTE — ASSESSMENT & PLAN NOTE
5/23/2022Dialysis MWF  5/24/2022  Continue with dialysis as scheduled.  5/25/2022  Dialysis session is going well.  Continue current support.  5/26/2022  Continue with dialysis .  5/27/2022  Dialysis as scheduled on MWF.   5/31/2022  Continue with scheduled hemodialysis for this patient.

## 2022-05-31 NOTE — ASSESSMENT & PLAN NOTE
I am not sure we need downsized this patient's trach I do not believe will ever have his trach removed mental status is stable need start looking for nursing home placement  5/31-  He is doing well with continous trach collar - will ask surgery to downsize to 6XLT so we can start capping it

## 2022-05-31 NOTE — ASSESSMENT & PLAN NOTE
Had a positive bone culture  Now on merrem and linezolid  ID note reviewed from 5/22-- continue IV antibiotics until 07/01/22

## 2022-05-31 NOTE — PROGRESS NOTES
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 male with wounds to sacral, left lateral lower leg, right anterior foot, right hand, left posterior heel, and right lateral and medial foot.  Wounds continue to show signs of necrosis, surgical interventions, and adherence to turn protocols. Bone culture and pathology confirmative for osteomyelitis. Patient continues to run fever. He had amputation of right 5th toe on 5/20/2022. Skin around amputation site has necrotic tissue periwound. 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 He is currently tolerating trach collar. He has been to OR multiple times to debridements, IV antibiotics, and bedside debridements. Pertinent medical history includes diabetes, hypertension, anemia, chronic respiratory failure, and infection. Factors that complicate wound healing include ESRD, chronic anemia,chronic respiratory failure, multiple co-morbidities, poor vascular supply, diabetes, decreased granulation tissue, necrosis and infection.           Review of Systems   Unable to perform ROS: Acuity of condition     Objective:     Vital Signs (Most Recent):  Temp: 99.6 °F (37.6 °C) (05/31/22 0800)  Pulse: 96 (05/31/22 0900)  Resp: (!) 35 (05/31/22 0900)  BP: (!) 130/52 (05/31/22 0800)  SpO2: 100 % (05/31/22 0900) Vital Signs (24h Range):  Temp:  [99.3 °F (37.4 °C)-99.6 °F (37.6 °C)] 99.6 °F (37.6 °C)  Pulse:  [] 96  Resp:  [19-41] 35  SpO2:  [98 %-100 %] 100 %  BP: ()/(36-65) 130/52     Weight:  77 kg (169 lb 12.1 oz)  Body mass index is 23.68 kg/m².  No data recorded    Physical Exam  Vitals reviewed.   Constitutional:       Appearance: He is ill-appearing.      Comments: Chronically ill   HENT:      Head: Normocephalic.   Cardiovascular:      Rate and Rhythm: Regular rhythm. Tachycardia present.   Pulmonary:      Effort: Pulmonary effort is normal.   Skin:     Findings: Erythema present.      Comments: Multiple wounds   Neurological:      Mental Status: Mental status is at baseline.      Sensory: Sensory deficit present.      Motor: Weakness present.               Negative Pressure Wound Therapy  05/26/22 1300 medial (Active)   05/26/22 1300   Side:    Orientation: medial   Location: Coccyx   Additional Comments:    Removal Indication and Assessment:    Location:    SDO Location:    NPWT Type Vacuum Therapy 06/06/22 0750   Therapy Setting NPWT Continuous therapy 06/06/22 0750   Pressure Setting NPWT 125 mmHg 06/06/22 0750   Therapy Interventions NPWT Seal intact 06/06/22 0750   Sponges Inserted NPWT Black;White 06/02/22 1200   Sponges Removed NPWT Black;White 06/02/22 1200   General Output (mL) 250 06/02/22 1200   Number of days: 11            Altered Skin Integrity 01/20/22 1000 Right anterior Foot Purple or maroon localized area of discolored intact skin or non-intact skin or a blood-filled blister. (Active)   01/20/22 1000   Altered Skin Integrity Present on Admission: suspected hospital acquired   Side: Right   Orientation: anterior   Location: Foot   Wound Number:    Is this injury device related?:    Primary Wound Type:    Description of Altered Skin Integrity: Purple or maroon localized area of discolored intact skin or non-intact skin or a blood-filled blister.   Removal Indication and Assessment:    Wound Outcome:    (Retired) Wound Length (cm):    (Retired) Wound Width (cm):    (Retired) Depth (cm):    Wound Description (Comments):    Removal Indications:    Wound Image    05/31/22 1300    Description of Altered Skin Integrity Full thickness tissue loss. Base is covered by slough and/or eschar in the wound bed 06/01/22 1300   Dressing Appearance Moist drainage 06/01/22 1300   Drainage Amount Scant 06/01/22 1300   Drainage Characteristics/Odor Serous;Tan;No odor 06/01/22 1300   Appearance Pink;Tan;Necrotic;Moist 06/01/22 1300   Tissue loss description Not applicable 06/01/22 1300   Black (%), Wound Tissue Color 0 % 05/31/22 1300   Red (%), Wound Tissue Color 5 % 05/31/22 1300   Yellow (%), Wound Tissue Color 95 % 05/31/22 1300   Periwound Area Moist 06/01/22 1300   Wound Edges Defined;Open 06/01/22 1300   Wound Length (cm) 2.5 cm 05/31/22 1300   Wound Width (cm) 2.5 cm 05/31/22 1300   Wound Depth (cm) 0.5 cm 05/31/22 1300   Wound Volume (cm^3) 3.125 cm^3 05/31/22 1300   Wound Surface Area (cm^2) 6.25 cm^2 05/31/22 1300   Care Cleansed with:;Soap and water 06/02/22 1200   Dressing Changed 06/05/22 1530   Periwound Care Moisture barrier applied 06/01/22 1300   Off Loading Other (see comments) 05/28/22 0705   Dressing Change Due 06/02/22 06/01/22 1300   Number of days: 137            Altered Skin Integrity 01/20/22 1000 Right lateral Foot Purple or maroon localized area of discolored intact skin or non-intact skin or a blood-filled blister. (Active)   01/20/22 1000   Altered Skin Integrity Present on Admission: suspected hospital acquired   Side: Right   Orientation: lateral   Location: Foot   Wound Number:    Is this injury device related?:    Primary Wound Type:    Description of Altered Skin Integrity: Purple or maroon localized area of discolored intact skin or non-intact skin or a blood-filled blister.   Removal Indication and Assessment:    Wound Outcome:    (Retired) Wound Length (cm):    (Retired) Wound Width (cm):    (Retired) Depth (cm):    Wound Description (Comments):    Removal Indications:    Wound Image    05/24/22 1330   Description of Altered Skin Integrity Full thickness tissue loss  with exposed bone, tendon, or muscle. Often includes undermining and tunneling. May extend into muscle and/or supporting structures. 05/24/22 1330   Dressing Appearance Dried drainage 05/29/22 0800   Drainage Amount Scant 05/29/22 0800   Drainage Characteristics/Odor [place]ung;No odor 05/29/22 0800   Appearance Black;Tan;Gray;Red 05/29/22 0800   Tissue loss description Not applicable 05/24/22 1330   Black (%), Wound Tissue Color 0 % 05/24/22 1330   Red (%), Wound Tissue Color 10 % 05/24/22 1330   Yellow (%), Wound Tissue Color 90 % 05/24/22 1330   Periwound Area Moist 05/29/22 0800   Wound Edges Defined;Open 05/24/22 1330   Wound Length (cm) 3 cm 05/24/22 1330   Wound Width (cm) 4.5 cm 05/24/22 1330   Wound Depth (cm) 0.5 cm 05/24/22 1330   Wound Volume (cm^3) 6.75 cm^3 05/24/22 1330   Wound Surface Area (cm^2) 13.5 cm^2 05/24/22 1330   Care Cleansed with:;Soap and water 06/02/22 1200   Dressing Changed 06/05/22 1530   Periwound Care Moisture barrier applied 05/29/22 0800   Dressing Change Due 05/25/22 05/24/22 1330   Number of days: 137            Altered Skin Integrity 02/08/22 1300 Left posterior Heel Other (comment) Full thickness tissue loss. Base is covered by slough and/or eschar in the wound bed (Active)   02/08/22 1300   Altered Skin Integrity Present on Admission: yes   Side: Left   Orientation: posterior   Location: Heel   Wound Number:    Is this injury device related?: No   Primary Wound Type: Other   Description of Altered Skin Integrity: Full thickness tissue loss. Base is covered by slough and/or eschar in the wound bed   Removal Indication and Assessment:    Wound Outcome:    (Retired) Wound Length (cm):    (Retired) Wound Width (cm):    (Retired) Depth (cm):    Wound Description (Comments):    Removal Indications:    Wound Image    05/31/22 1300   Description of Altered Skin Integrity Full thickness tissue loss with exposed bone, tendon, or muscle. Often includes undermining and tunneling. May extend  into muscle and/or supporting structures. 06/01/22 1300   Dressing Appearance Dried drainage 06/01/22 1300   Drainage Amount Small 06/01/22 1300   Drainage Characteristics/Odor Serous;Tan;No odor 06/01/22 1300   Appearance Pink;Necrotic;Moist 06/01/22 1300   Tissue loss description Not applicable 06/01/22 1300   Black (%), Wound Tissue Color 0 % 05/31/22 1300   Red (%), Wound Tissue Color 20 % 05/31/22 1300   Yellow (%), Wound Tissue Color 80 % 05/31/22 1300   Periwound Area Moist 06/01/22 1300   Wound Edges Defined;Open 06/01/22 1300   Wound Length (cm) 7.5 cm 05/31/22 1300   Wound Width (cm) 6.5 cm 05/31/22 1300   Wound Depth (cm) 0.5 cm 05/24/22 1330   Wound Volume (cm^3) 21.7 cm^3 05/24/22 1330   Wound Surface Area (cm^2) 48.75 cm^2 05/31/22 1300   Care Cleansed with:;Soap and water 06/02/22 1200   Dressing Changed 06/05/22 1530   Periwound Care Moisture barrier applied;Moisturizer applied 06/01/22 1300   Off Loading Other (see comments) 06/01/22 1300   Dressing Change Due 06/02/22 06/01/22 1300   Number of days: 118            Altered Skin Integrity 02/15/22 1300 Left lateral Leg Traumatic Partial thickness tissue loss. Shallow open ulcer with a red or pink wound bed, without slough. Intact or Open/Ruptured Serum-filled blister. (Active)   02/15/22 1300   Altered Skin Integrity Present on Admission: suspected hospital acquired   Side: Left   Orientation: lateral   Location: Leg   Wound Number:    Is this injury device related?: No   Primary Wound Type: Traumatic   Description of Altered Skin Integrity: Partial thickness tissue loss. Shallow open ulcer with a red or pink wound bed, without slough. Intact or Open/Ruptured Serum-filled blister.   Removal Indication and Assessment:    Wound Outcome:    (Retired) Wound Length (cm):    (Retired) Wound Width (cm):    (Retired) Depth (cm):    Wound Description (Comments):    Removal Indications:    Wound Image    05/31/22 1300   Description of Altered Skin Integrity  Full thickness tissue loss. Base is covered by slough and/or eschar in the wound bed 04/26/22 1235   Dressing Appearance Open to air;Dry;Intact 06/01/22 1300   Drainage Amount None 06/01/22 1300   Appearance Dry 06/01/22 1300   Tissue loss description Not applicable 06/01/22 1300   Black (%), Wound Tissue Color 10 % 04/26/22 1235   Red (%), Wound Tissue Color 100 % 05/31/22 1300   Yellow (%), Wound Tissue Color 0 % 04/26/22 1235   Periwound Area Dry 06/01/22 1300   Wound Edges Defined 06/01/22 1300   Care Cleansed with:;Soap and water 06/01/22 1300   Dressing Changed 05/22/22 1400   Dressing Change Due 05/09/22 05/08/22 1200   Number of days: 111            Altered Skin Integrity 02/28/22 1300 Left anterior Foot Traumatic Partial thickness tissue loss. Shallow open ulcer with a red or pink wound bed, without slough. Intact or Open/Ruptured Serum-filled blister. (Active)   02/28/22 1300   Altered Skin Integrity Present on Admission: suspected hospital acquired   Side: Left   Orientation: anterior   Location: Foot   Wound Number:    Is this injury device related?: No   Primary Wound Type: Traumatic   Description of Altered Skin Integrity: Partial thickness tissue loss. Shallow open ulcer with a red or pink wound bed, without slough. Intact or Open/Ruptured Serum-filled blister.   Removal Indication and Assessment:    Wound Outcome:    (Retired) Wound Length (cm):    (Retired) Wound Width (cm):    (Retired) Depth (cm):    Wound Description (Comments):    Removal Indications:    Wound Image    05/31/22 1300   Description of Altered Skin Integrity Partial thickness tissue loss. Shallow open ulcer with a red or pink wound bed, without slough. Intact or Open/Ruptured Serum-filled blister. 05/12/22 1200   Dressing Appearance Open to air;Dry;Intact 06/01/22 1300   Drainage Amount None 06/01/22 1300   Drainage Characteristics/Odor Serous 05/10/22 0742   Appearance Dry 06/01/22 1300   Tissue loss description Partial thickness  05/04/22 1300   Black (%), Wound Tissue Color 0 % 05/17/22 1300   Red (%), Wound Tissue Color 0 % 05/17/22 1300   Yellow (%), Wound Tissue Color 0 % 05/17/22 1300   Periwound Area Dry 06/01/22 1300   Wound Edges Defined 06/01/22 1300   Care Cleansed with:;Soap and water 05/31/22 1300   Dressing Changed 05/22/22 1400   Dressing Change Due 05/09/22 05/08/22 1200   Number of days: 98            Altered Skin Integrity 03/15/22 1000 Right lateral Malleolus Ulceration Purple or maroon localized area of discolored intact skin or non-intact skin or a blood-filled blister. (Active)   03/15/22 1000   Altered Skin Integrity Present on Admission: suspected hospital acquired   Side: Right   Orientation: lateral   Location: Malleolus   Wound Number:    Is this injury device related?: Yes   Primary Wound Type: Ulceration   Description of Altered Skin Integrity: Purple or maroon localized area of discolored intact skin or non-intact skin or a blood-filled blister.   Removal Indication and Assessment:    Wound Outcome:    (Retired) Wound Length (cm):    (Retired) Wound Width (cm):    (Retired) Depth (cm):    Wound Description (Comments):    Removal Indications:    Wound Image    05/31/22 1300   Description of Altered Skin Integrity Full thickness tissue loss. Subcutaneous fat may be visible but bone, tendon or muscle are not exposed 06/01/22 1300   Dressing Appearance Moist drainage 06/01/22 1300   Drainage Amount Small 06/01/22 1300   Drainage Characteristics/Odor Serous;Tan;No odor 06/01/22 1300   Appearance Pink;Moist 06/01/22 1300   Tissue loss description Full thickness 06/01/22 1300   Black (%), Wound Tissue Color 0 % 05/31/22 1300   Red (%), Wound Tissue Color 99 % 05/31/22 1300   Yellow (%), Wound Tissue Color 1 % 05/31/22 1300   Periwound Area Moist;[place] 06/01/22 1300   Wound Edges Defined;Open 06/01/22 1300   Wound Length (cm) 3 cm 05/31/22 1300   Wound Width (cm) 3.5 cm 05/31/22 1300   Wound Depth (cm) 0.2 cm 05/31/22  1300   Wound Volume (cm^3) 2.1 cm^3 05/31/22 1300   Wound Surface Area (cm^2) 10.5 cm^2 05/31/22 1300   Care Cleansed with:;Soap and water 06/02/22 1200   Dressing Changed 06/05/22 1530   Periwound Care Moisture barrier applied 06/01/22 1300   Off Loading Other (see comments) 06/01/22 1300   Dressing Change Due 06/02/22 06/01/22 1300   Number of days: 83            Altered Skin Integrity 03/15/22 1000 Right lateral Leg Ulceration Purple or maroon localized area of discolored intact skin or non-intact skin or a blood-filled blister. (Active)   03/15/22 1000   Altered Skin Integrity Present on Admission: suspected hospital acquired   Side: Right   Orientation: lateral   Location: Leg   Wound Number:    Is this injury device related?: No   Primary Wound Type: Ulceration   Description of Altered Skin Integrity: Purple or maroon localized area of discolored intact skin or non-intact skin or a blood-filled blister.   Removal Indication and Assessment:    Wound Outcome:    (Retired) Wound Length (cm):    (Retired) Wound Width (cm):    (Retired) Depth (cm):    Wound Description (Comments):    Removal Indications:    Wound Image    05/31/22 1300   Description of Altered Skin Integrity Full thickness tissue loss with exposed bone, tendon, or muscle. Often includes undermining and tunneling. May extend into muscle and/or supporting structures. 06/01/22 1300   Dressing Appearance Moist drainage 06/01/22 1300   Drainage Amount Small 06/01/22 1300   Drainage Characteristics/Odor Serous;Tan;No odor 06/01/22 1300   Appearance Pink;Moist;Bone 06/01/22 1300   Tissue loss description Full thickness 06/01/22 1300   Black (%), Wound Tissue Color 0 % 05/31/22 1300   Red (%), Wound Tissue Color 90 % 05/31/22 1300   Yellow (%), Wound Tissue Color 10 % 05/31/22 1300   Periwound Area Moist 06/01/22 1300   Wound Edges Defined;Open 06/01/22 1300   Wound Length (cm) 11.5 cm 05/31/22 1300   Wound Width (cm) 4 cm 05/31/22 1300   Wound Depth (cm)  0.5 cm 05/31/22 1300   Wound Volume (cm^3) 23 cm^3 05/31/22 1300   Wound Surface Area (cm^2) 46 cm^2 05/31/22 1300   Care Cleansed with:;Soap and water 06/02/22 1200   Dressing Changed 06/05/22 1530   Periwound Care Moisture barrier applied 06/01/22 1300   Off Loading Other (see comments) 06/01/22 1300   Dressing Change Due 06/02/22 06/01/22 1300   Number of days: 83            Altered Skin Integrity 03/22/22 1300 posterior Sacral spine Other (comment) Full thickness tissue loss. Base is covered by slough and/or eschar in the wound bed (Active)   03/22/22 1300   Altered Skin Integrity Present on Admission: yes   Side:    Orientation: posterior   Location: Sacral spine   Wound Number:    Is this injury device related?: No   Primary Wound Type: Other   Description of Altered Skin Integrity: Full thickness tissue loss. Base is covered by slough and/or eschar in the wound bed   Removal Indication and Assessment:    Wound Outcome:    (Retired) Wound Length (cm):    (Retired) Wound Width (cm):    (Retired) Depth (cm):    Wound Description (Comments):    Removal Indications:    Wound Image    05/31/22 1300   Description of Altered Skin Integrity Full thickness tissue loss with exposed bone, tendon, or muscle. Often includes undermining and tunneling. May extend into muscle and/or supporting structures. 05/31/22 1300   Dressing Appearance Moist drainage 05/31/22 1300   Drainage Amount Moderate 05/31/22 1300   Drainage Characteristics/Odor Serosanguineous;No odor 05/31/22 1300   Appearance Pink;Tan;Fibrin;Moist;Granulating;Bone 05/31/22 1300   Tissue loss description Full thickness 05/31/22 1300   Black (%), Wound Tissue Color 0 % 05/31/22 1300   Red (%), Wound Tissue Color 90 % 05/31/22 1300   Yellow (%), Wound Tissue Color 10 % 05/31/22 1300   Periwound Area Hemosiderin Staining;Moist 05/31/22 1300   Wound Edges Defined;Open 05/31/22 1300   Wound Length (cm) 13 cm 05/31/22 1300   Wound Width (cm) 12 cm 05/31/22 1300    Wound Depth (cm) 2 cm 05/31/22 1300   Wound Volume (cm^3) 312 cm^3 05/31/22 1300   Wound Surface Area (cm^2) 156 cm^2 05/31/22 1300   Care Cleansed with:;Soap and water 06/02/22 1200   Dressing Removed;Applied;Changed 05/31/22 1300   Packing packed with;strip gauze;other (see comment) 05/22/22 1400   Periwound Care Skin barrier film applied 05/31/22 1300   Dressing Change Due 06/02/22 05/31/22 1300   Number of days: 76            Altered Skin Integrity 04/05/22 1130 Right anterior Toe, second Traumatic (Active)   04/05/22 1130   Altered Skin Integrity Present on Admission: suspected hospital acquired   Side: Right   Orientation: anterior   Location: Toe, second   Wound Number:    Is this injury device related?: No   Primary Wound Type: Traumatic   Description of Altered Skin Integrity:    Removal Indication and Assessment:    Wound Outcome:    (Retired) Wound Length (cm):    (Retired) Wound Width (cm):    (Retired) Depth (cm):    Wound Description (Comments):    Removal Indications:    Wound Image    05/31/22 1300   Dressing Appearance Dry;Open to air 06/01/22 1300   Drainage Amount None 06/01/22 1300   Drainage Characteristics/Odor Creamy 04/17/22 0800   Appearance Black;Dry 06/01/22 1300   Tissue loss description Not applicable 05/31/22 1300   Wound Edges Defined 05/31/22 1300   Care Cleansed with:;Soap and water 06/02/22 1200   Dressing Applied;Collagen 06/02/22 1200   Number of days: 62            Altered Skin Integrity 04/07/22 1130 posterior Buttocks Incontinence associated dermatitis (Active)   04/07/22 1130   Altered Skin Integrity Present on Admission: suspected hospital acquired   Side:    Orientation: posterior   Location: Buttocks   Wound Number:    Is this injury device related?: No   Primary Wound Type: Incontinence   Description of Altered Skin Integrity:    Removal Indication and Assessment:    Wound Outcome:    (Retired) Wound Length (cm):    (Retired) Wound Width (cm):    (Retired) Depth (cm):     Wound Description (Comments):    Removal Indications:    Wound Image    05/31/22 1300   Description of Altered Skin Integrity Partial thickness tissue loss. Shallow open ulcer with a red or pink wound bed, without slough. Intact or Open/Ruptured Serum-filled blister. 05/31/22 1300   Dressing Appearance Open to air;Dry;Intact 05/31/22 1300   Drainage Amount None 05/31/22 1300   Drainage Characteristics/Odor No odor 05/31/22 1300   Appearance [place] 05/31/22 1300   Tissue loss description Partial thickness 05/12/22 1200   Black (%), Wound Tissue Color 0 % 05/31/22 1300   Red (%), Wound Tissue Color 100 % 05/31/22 1300   Yellow (%), Wound Tissue Color 0 % 05/31/22 1300   Periwound Area Dry;[place] 05/31/22 1300   Wound Edges Defined 05/31/22 1300   Care Cleansed with:;Soap and water;Moisturizing agent 05/31/22 1300   Dressing Removed;Applied 05/23/22 0000   Packing packed with;other (see comment) 05/23/22 0000   Periwound Care Moisture barrier applied 05/27/22 0705   Dressing Change Due 05/09/22 05/08/22 1200   Number of days: 60            Incision/Site 01/04/22 1444 Neck (Active)   01/04/22 1444   Present Prior to Hospital Arrival?: No   Side:    Location: Neck   Orientation:    Incision Type:    Closure Method:    Additional Comments:    Removal Indication and Assessment:    Wound Outcome:    Removal Indications:    Incision WDL WDL 05/28/22 0705   Dressing Appearance Open to air 05/28/22 0705   Drainage Amount None 05/27/22 0705   Number of days: 153            Incision/Site 05/20/22 1005 Right Foot (Active)   05/20/22 1005   Present Prior to Hospital Arrival?:    Side: Right   Location: Foot   Orientation:    Incision Type:    Closure Method:    Additional Comments:    Removal Indication and Assessment:    Wound Outcome:    Removal Indications:    Wound Image     05/31/22 1300   Incision WDL WDL 06/01/22 1300   Dressing Appearance Moist drainage 06/01/22 1300   Drainage Amount Small 06/01/22 1300   Drainage  Characteristics/Odor Green;No odor;Serous 06/01/22 1300   Appearance Dressing in place, unable to visualize 06/03/22 2000   Black (%), Wound Tissue Color 20 % 05/31/22 1300   Red (%), Wound Tissue Color 30 % 05/31/22 1300   Yellow (%), Wound Tissue Color 50 % 05/31/22 1300   Periwound Area Moist;Excoriated 06/01/22 1300   Wound Edges Defined;Open 06/01/22 1300   Wound Length (cm) 4 cm 05/31/22 1300   Wound Width (cm) 6 cm 05/31/22 1300   Wound Depth (cm) 1 cm 05/31/22 1300   Wound Volume (cm^3) 24 cm^3 05/31/22 1300   Wound Surface Area (cm^2) 24 cm^2 05/31/22 1300   Care Cleansed with:;Soap and water 06/02/22 1200   Dressing Changed 06/04/22 1400   Periwound Care Moisture barrier applied;Skin barrier film applied 06/01/22 1300   Off Loading Other (see comments) 06/01/22 1300   Dressing Change Due 06/02/22 06/01/22 1300   Number of days: 17         Assessment and Plan      Osteomyelitis       ID following for antibiotics, pending pathology and bone culture reports  Ulcer of right lower extremity with necrosis of bone  Clean wound with vashe  Apply sensicare around wound  Apply santyl naz thick to wound bed, cover with vashe/saline moistened 4x4  Cover and secure with abd pad and paper tape  Change daily  Turn every two hours  Low air loss mattress  Keep pressure off wound  TAP system   Bedside debridements weekly and PRN  Pressure ulcer of sacral region, stage 4  Clean wound with vashe  Apply sensicare around wound  Apply santyl naz thick to wound bed, cover with vashe/saline moistened 4x4  Cover and secure with abd pad and paper tape  Change daily  Turn every two hours  Low air loss mattress  Keep pressure off wound  TAP system   Bedside debridements weekly and PRN      Signature:  HERSON Viveros  Wound Care    Date of encounter: 05/31/2022

## 2022-05-31 NOTE — SUBJECTIVE & OBJECTIVE
Interval History: awake, alert on exam. Tolerating trach collar continuously.     Objective:     Vital Signs (Most Recent):  Temp: 99.6 °F (37.6 °C) (05/31/22 0800)  Pulse: 96 (05/31/22 0900)  Resp: (!) 35 (05/31/22 0900)  BP: (!) 130/52 (05/31/22 0800)  SpO2: 100 % (05/31/22 0900)   Vital Signs (24h Range):  Temp:  [99.3 °F (37.4 °C)-99.6 °F (37.6 °C)] 99.6 °F (37.6 °C)  Pulse:  [] 96  Resp:  [19-41] 35  SpO2:  [98 %-100 %] 100 %  BP: ()/(36-65) 130/52     Weight: 77 kg (169 lb 12.1 oz)  Body mass index is 23.68 kg/m².      Intake/Output Summary (Last 24 hours) at 5/31/2022 1133  Last data filed at 5/31/2022 0600  Gross per 24 hour   Intake 1570 ml   Output 1700 ml   Net -130 ml       Physical Exam  Vitals reviewed.   HENT:      Right Ear: External ear normal.      Left Ear: External ear normal.      Mouth/Throat:      Mouth: Mucous membranes are moist.   Eyes:      Conjunctiva/sclera: Conjunctivae normal.   Neck:      Trachea: Tracheostomy present.   Cardiovascular:      Rate and Rhythm: Normal rate and regular rhythm.   Pulmonary:      Effort: Pulmonary effort is normal.      Breath sounds: Normal breath sounds.   Abdominal:      General: Bowel sounds are normal.      Palpations: Abdomen is soft.   Musculoskeletal:         General: Normal range of motion.      Cervical back: Normal range of motion and neck supple.   Skin:     General: Skin is warm and dry.      Capillary Refill: Capillary refill takes less than 2 seconds.   Neurological:      Mental Status: He is alert. Mental status is at baseline.   Psychiatric:         Mood and Affect: Mood normal.       Vents:  Vent Mode: Standby (05/28/22 0415)  Ventilator Initiated: No (04/23/22 1934)  Set Rate: 0 BPM (05/18/22 0516)  Vt Set: 0 mL (05/18/22 0516)  Pressure Support: 15 cmH20 (05/22/22 0044)  PEEP/CPAP: 5 cmH20 (05/22/22 0044)  Oxygen Concentration (%): 35 (05/31/22 0600)  Peak Airway Pressure: 21 cmH2O (05/22/22 0044)  Plateau Pressure: 20 cmH20  (03/07/22 0500)  Total Ve: 7.8 mL (05/22/22 0044)  F/VT Ratio<105 (RSBI): (!) 22.64 (05/22/22 0044)    Lines/Drains/Airways       Central Venous Catheter Line  Duration                  Hemodialysis Catheter 12/30/21 0900 right internal jugular 152 days              Drain  Duration                  Colostomy 12/18/21 1030 Descending/sigmoid  days         Gastrostomy/Enterostomy 03/09/22 1436 Percutaneous endoscopic gastrostomy (PEG) midline feeding 82 days              Airway  Duration                  Surgical Airway 04/27/22 0856 Shiley;Other (Comment) Cuffed;Long 34 days              Peripheral Intravenous Line  Duration                  Peripheral IV - Single Lumen 05/27/22 0855 20 G Posterior;Right Hand 4 days                    Significant Labs:    CBC/Anemia Profile:  Recent Labs   Lab 05/30/22  0306   WBC 18.48*   HGB 6.8*   HCT 21.3*   *   .0*   RDW 14.3        Chemistries:  Recent Labs   Lab 05/30/22  0708   *   K 3.8   CL 93*   CO2 23   BUN 76*   CREATININE 2.13*   CALCIUM 8.9       All pertinent labs within the past 24 hours have been reviewed.    Significant Imaging:  I have reviewed all pertinent imaging results/findings within the past 24 hours.

## 2022-05-31 NOTE — SUBJECTIVE & OBJECTIVE
Interval History: The patient is resting.  He is on trach collar and is doing acceptable.      Review of patient's allergies indicates:  No Known Allergies  Current Facility-Administered Medications   Medication Frequency    0.9%  NaCl infusion (for blood administration) Q24H PRN    0.9%  NaCl infusion PRN    acetaminophen tablet 500 mg Q6H PRN    albuterol nebulizer solution 2.5 mg Q4H PRN    albuterol-ipratropium 2.5 mg-0.5 mg/3 mL nebulizer solution 3 mL Q6H    bisacodyL EC tablet 10 mg Daily PRN    collagenase ointment Daily PRN    dextrose 50% injection 12.5 g PRN    diltiaZEM tablet 90 mg Q6H    epoetin beth-epbx injection 10,000 Units Every Mon, Wed, Fri    glucagon (human recombinant) injection 1 mg PRN    guaiFENesin 100 mg/5 ml syrup 200 mg Q6H    heparin (porcine) injection 4,000 Units PRN    heparin (porcine) injection 5,000 Units Q8H    insulin aspart U-100 injection 1-10 Units Q6H    insulin detemir U-100 injection 25 Units QHS    linezolid 600 mg/300 mL IVPB 600 mg Q12H    meropenem (MERREM) 500 mg in sodium chloride 0.9 % 100 mL IVPB (MB+) Q24H    metoprolol tartrate (LOPRESSOR) split tablet 12.5 mg BID    pantoprazole suspension 40 mg Daily    sevelamer carbonate pwpk 0.8 g TID WM    simethicone chewable tablet 80 mg TID PRN    sodium chloride 0.9% bolus 250 mL PRN    tobramycin - pharmacy to dose pharmacy to manage frequency       Objective:     Vital Signs (Most Recent):  Temp: 99.6 °F (37.6 °C) (05/31/22 0800)  Pulse: 89 (05/31/22 1330)  Resp: (!) 35 (05/31/22 1330)  BP: (!) 130/52 (05/31/22 0800)  SpO2: 100 % (05/31/22 1330)  O2 Device (Oxygen Therapy): Trach Collar (05/31/22 0909)   Vital Signs (24h Range):  Temp:  [99.3 °F (37.4 °C)-99.6 °F (37.6 °C)] 99.6 °F (37.6 °C)  Pulse:  [] 89  Resp:  [21-41] 35  SpO2:  [98 %-100 %] 100 %  BP: (105-136)/(49-65) 130/52     Weight: 77 kg (169 lb 12.1 oz) (05/31/22 0600)  Body mass index is 23.68 kg/m².  Body surface area is 1.96 meters  squared.    I/O last 3 completed shifts:  In: 3500 [P.O.:240; Blood:300; NG/GT:2160; IV Piggyback:800]  Out: 1880 [Other:1430; Stool:450]    Physical Exam  Vitals reviewed.   HENT:      Head: Normocephalic.      Mouth/Throat:      Mouth: Mucous membranes are moist.   Cardiovascular:      Rate and Rhythm: Regular rhythm.   Pulmonary:      Effort: Pulmonary effort is normal.   Abdominal:      Palpations: Abdomen is soft.   Neurological:      Mental Status: He is alert.       Significant Labs:  BMP:   Recent Labs   Lab 05/30/22  0708   GLU 64*   *   K 3.8   CL 93*   CO2 23   BUN 76*   CREATININE 2.13*   CALCIUM 8.9     CBC:   Recent Labs   Lab 05/30/22  0306   WBC 18.48*   RBC 2.13*   HGB 6.8*   HCT 21.3*   *   .0*   MCH 31.9*   MCHC 31.9*        Significant Imaging:  Labs: Reviewed

## 2022-05-31 NOTE — PROGRESS NOTES
[facility]  Pulmonology  Progress Note    Patient Name: Og Garcia  MRN: [id number]  Admission Date: 12/23/2021  Hospital Length of Stay: 159 days  Code Status: Prior  Attending Provider: Cecil Abernathy DO  Primary Care Provider: Hector Arndt DNP, FNP-C   Principal Problem: Denice gangrene    Subjective:     Interval History: awake, alert on exam. Tolerating trach collar continuously.     Objective:     Vital Signs (Most Recent):  Temp: 99.6 °F (37.6 °C) (05/31/22 0800)  Pulse: 96 (05/31/22 0900)  Resp: (!) 35 (05/31/22 0900)  BP: (!) 130/52 (05/31/22 0800)  SpO2: 100 % (05/31/22 0900)   Vital Signs (24h Range):  Temp:  [99.3 °F (37.4 °C)-99.6 °F (37.6 °C)] 99.6 °F (37.6 °C)  Pulse:  [] 96  Resp:  [19-41] 35  SpO2:  [98 %-100 %] 100 %  BP: ()/(36-65) 130/52     Weight: 77 kg (169 lb 12.1 oz)  Body mass index is 23.68 kg/m².      Intake/Output Summary (Last 24 hours) at 5/31/2022 1133  Last data filed at 5/31/2022 0600  Gross per 24 hour   Intake 1570 ml   Output 1700 ml   Net -130 ml       Physical Exam  Vitals reviewed.   HENT:      Right Ear: External ear normal.      Left Ear: External ear normal.      Mouth/Throat:      Mouth: Mucous membranes are moist.   Eyes:      Conjunctiva/sclera: Conjunctivae normal.   Neck:      Trachea: Tracheostomy present.   Cardiovascular:      Rate and Rhythm: Normal rate and regular rhythm.   Pulmonary:      Effort: Pulmonary effort is normal.      Breath sounds: Normal breath sounds.   Abdominal:      General: Bowel sounds are normal.      Palpations: Abdomen is soft.   Musculoskeletal:         General: Normal range of motion.      Cervical back: Normal range of motion and neck supple.   Skin:     General: Skin is warm and dry.      Capillary Refill: Capillary refill takes less than 2 seconds.   Neurological:      Mental Status: He is alert. Mental status is at baseline.   Psychiatric:         Mood and Affect: Mood normal.        Vents:  Vent Mode: Standby (05/28/22 0415)  Ventilator Initiated: No (04/23/22 1934)  Set Rate: 0 BPM (05/18/22 0516)  Vt Set: 0 mL (05/18/22 0516)  Pressure Support: 15 cmH20 (05/22/22 0044)  PEEP/CPAP: 5 cmH20 (05/22/22 0044)  Oxygen Concentration (%): 35 (05/31/22 0600)  Peak Airway Pressure: 21 cmH2O (05/22/22 0044)  Plateau Pressure: 20 cmH20 (03/07/22 0500)  Total Ve: 7.8 mL (05/22/22 0044)  F/VT Ratio<105 (RSBI): (!) 22.64 (05/22/22 0044)    Lines/Drains/Airways       Central Venous Catheter Line  Duration                  Hemodialysis Catheter 12/30/21 0900 right internal jugular 152 days              Drain  Duration                  Colostomy 12/18/21 1030 Descending/sigmoid  days         Gastrostomy/Enterostomy 03/09/22 1436 Percutaneous endoscopic gastrostomy (PEG) midline feeding 82 days              Airway  Duration                  Surgical Airway 04/27/22 0856 Marychuy;Other (Comment) Cuffed;Long 34 days              Peripheral Intravenous Line  Duration                  Peripheral IV - Single Lumen 05/27/22 0855 20 G Posterior;Right Hand 4 days                    Significant Labs:    CBC/Anemia Profile:  Recent Labs   Lab 05/30/22  0306   WBC 18.48*   HGB 6.8*   HCT 21.3*   *   .0*   RDW 14.3        Chemistries:  Recent Labs   Lab 05/30/22  0708   *   K 3.8   CL 93*   CO2 23   BUN 76*   CREATININE 2.13*   CALCIUM 8.9       All pertinent labs within the past 24 hours have been reviewed.    Significant Imaging:  I have reviewed all pertinent imaging results/findings within the past 24 hours.    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  Status post amputation    Osteomyelitis  Continue IV antibiotics     Chronic respiratory failure  I am not sure we need downsized this patient's trach I do not believe will ever have his trach removed mental status is stable need  start looking for nursing home placement  5/31-  He is doing well with continous trach collar - will ask surgery to downsize to 6XLT so we can start capping it       Tachycardia    5/31- stable on cardizem and metoprolol      Pressure ulcer of sacral region, stage 4    Now with wound vac    Fever    Had a positive bone culture  Now on merrem and linezolid  ID note reviewed from 5/22-- continue IV antibiotics until 07/01/22      Type 2 diabetes mellitus without complication, without long-term current use of insulin  Blood sugar is well controlled  Continue with levemir 25 units and sliding scale    Necrotizing fasciitis-resolved as of 5/31/2022  S/p multiple surgeries   Last debridement for wounds was 4/13                   FLORESITA Shafer-ACNP  Pulmonology  [facility]

## 2022-06-01 NOTE — PROCEDURES
"Og Garcia is a 67 y.o. male patient.    Temp: 99.6 °F (37.6 °C) (06/01/22 0700)  Pulse: 99 (06/01/22 0832)  Resp: (!) 24 (06/01/22 0725)  BP: (!) 144/64 (06/01/22 0832)  SpO2: 98 % (06/01/22 0725)  Weight: 77.2 kg (170 lb 3.1 oz) (06/01/22 0700)  Height: 5' 11" (180.3 cm) (05/16/22 1123)       Procedures     Pulmonary asked surgery to Downsize trach  Tolerating trach collar  Bulb deflated, placed inline 14 [place] suction catheter   Removed #8 XLT trach  repalced with #6 XLT trach   Secured with velcro strap  RT at bedside suctioned blood tinged secretions  Oxygenating 100%  Tolerated well    6/1/2022  "

## 2022-06-01 NOTE — PROGRESS NOTES
[facility]  Nephrology  Progress Note    Patient Name: Og Garcia  MRN: [id number]  Admission Date: 12/23/2021  Hospital Length of Stay: 160 days  Attending Provider: Cecil Abernathy DO   Primary Care Physician: Hector Arndt DNP, FNP-C  Principal Problem:Denice gangrene    Subjective:     HPI: The patient is known from the previous nephrology consult at [facility].  He is no at Specialty and continues to need dialysis support.      Interval History: The patient is resting.  No other acute changes.     Review of patient's allergies indicates:  No Known Allergies  Current Facility-Administered Medications   Medication Frequency    0.9%  NaCl infusion (for blood administration) Q24H PRN    0.9%  NaCl infusion PRN    acetaminophen tablet 500 mg Q6H PRN    albuterol nebulizer solution 2.5 mg Q4H PRN    albuterol-ipratropium 2.5 mg-0.5 mg/3 mL nebulizer solution 3 mL Q6H    bisacodyL EC tablet 10 mg Daily PRN    collagenase ointment Daily PRN    dextrose 50% injection 12.5 g PRN    diltiaZEM tablet 90 mg Q6H    epoetin beth-epbx injection 10,000 Units Every Mon, Wed, Fri    glucagon (human recombinant) injection 1 mg PRN    guaiFENesin 100 mg/5 ml syrup 200 mg Q6H    heparin (porcine) injection 4,000 Units PRN    heparin (porcine) injection 5,000 Units Q8H    insulin aspart U-100 injection 1-10 Units Q6H    insulin detemir U-100 injection 25 Units QHS    linezolid 600 mg/300 mL IVPB 600 mg Q12H    meropenem (MERREM) 500 mg in sodium chloride 0.9 % 100 mL IVPB (MB+) Q24H    metoprolol tartrate (LOPRESSOR) split tablet 12.5 mg BID    pantoprazole suspension 40 mg Daily    sevelamer carbonate pwpk 0.8 g TID WM    simethicone chewable tablet 80 mg TID PRN    sodium chloride 0.9% bolus 250 mL PRN    tobramycin - pharmacy to dose pharmacy to manage frequency       Objective:     Vital Signs (Most Recent):  Temp: 99.6 °F (37.6 °C) (06/01/22 1100)  Pulse: 97 (06/01/22  1230)  Resp: (!) 26 (06/01/22 1230)  BP: (!) 121/59 (06/01/22 1226)  SpO2: 100 % (06/01/22 1230)  O2 Device (Oxygen Therapy): Trach Collar (06/01/22 1230)   Vital Signs (24h Range):  Temp:  [98.2 °F (36.8 °C)-100.1 °F (37.8 °C)] 99.6 °F (37.6 °C)  Pulse:  [] 97  Resp:  [10-36] 26  SpO2:  [98 %-100 %] 100 %  BP: (105-144)/(46-64) 121/59     Weight: 77.2 kg (170 lb 3.1 oz) (06/01/22 0700)  Body mass index is 23.74 kg/m².  Body surface area is 1.97 meters squared.    I/O last 3 completed shifts:  In: 3890 [P.O.:480; NG/GT:2610; IV Piggyback:800]  Out: 500 [Stool:500]    Physical Exam  Vitals reviewed.   Constitutional:       Appearance: He is obese.   HENT:      Head: Normocephalic.      Mouth/Throat:      Mouth: Mucous membranes are moist.   Cardiovascular:      Rate and Rhythm: Regular rhythm.   Pulmonary:      Effort: Pulmonary effort is normal.      Comments: Trach collar  Abdominal:      Palpations: Abdomen is soft.   Neurological:      Mental Status: He is alert.       Significant Labs:  BMP:   Recent Labs   Lab 05/30/22  0708   GLU 64*   *   K 3.8   CL 93*   CO2 23   BUN 76*   CREATININE 2.13*   CALCIUM 8.9     CBC:   Recent Labs   Lab 05/30/22  0306   WBC 18.48*   RBC 2.13*   HGB 6.8*   HCT 21.3*   *   .0*   MCH 31.9*   MCHC 31.9*        Significant Imaging:  Labs: Reviewed    Assessment/Plan:     ESRD (end stage renal disease)  6/1/2022  Continue with dialysis as scheduled      Type 2 diabetes mellitus without complication, without long-term current use of insulin  Continue current treatment  Chronic condition        Thank you for your consult. I will follow-up with patient. Please contact us if you have any additional questions.    Edwin Pryor Jr, MD  Nephrology  [facility]

## 2022-06-01 NOTE — SUBJECTIVE & OBJECTIVE
Interval History: The patient is resting.  No other acute changes.     Review of patient's allergies indicates:  No Known Allergies  Current Facility-Administered Medications   Medication Frequency    0.9%  NaCl infusion (for blood administration) Q24H PRN    0.9%  NaCl infusion PRN    acetaminophen tablet 500 mg Q6H PRN    albuterol nebulizer solution 2.5 mg Q4H PRN    albuterol-ipratropium 2.5 mg-0.5 mg/3 mL nebulizer solution 3 mL Q6H    bisacodyL EC tablet 10 mg Daily PRN    collagenase ointment Daily PRN    dextrose 50% injection 12.5 g PRN    diltiaZEM tablet 90 mg Q6H    epoetin beth-epbx injection 10,000 Units Every Mon, Wed, Fri    glucagon (human recombinant) injection 1 mg PRN    guaiFENesin 100 mg/5 ml syrup 200 mg Q6H    heparin (porcine) injection 4,000 Units PRN    heparin (porcine) injection 5,000 Units Q8H    insulin aspart U-100 injection 1-10 Units Q6H    insulin detemir U-100 injection 25 Units QHS    linezolid 600 mg/300 mL IVPB 600 mg Q12H    meropenem (MERREM) 500 mg in sodium chloride 0.9 % 100 mL IVPB (MB+) Q24H    metoprolol tartrate (LOPRESSOR) split tablet 12.5 mg BID    pantoprazole suspension 40 mg Daily    sevelamer carbonate pwpk 0.8 g TID WM    simethicone chewable tablet 80 mg TID PRN    sodium chloride 0.9% bolus 250 mL PRN    tobramycin - pharmacy to dose pharmacy to manage frequency       Objective:     Vital Signs (Most Recent):  Temp: 99.6 °F (37.6 °C) (06/01/22 1100)  Pulse: 97 (06/01/22 1230)  Resp: (!) 26 (06/01/22 1230)  BP: (!) 121/59 (06/01/22 1226)  SpO2: 100 % (06/01/22 1230)  O2 Device (Oxygen Therapy): Trach Collar (06/01/22 1230)   Vital Signs (24h Range):  Temp:  [98.2 °F (36.8 °C)-100.1 °F (37.8 °C)] 99.6 °F (37.6 °C)  Pulse:  [] 97  Resp:  [10-36] 26  SpO2:  [98 %-100 %] 100 %  BP: (105-144)/(46-64) 121/59     Weight: 77.2 kg (170 lb 3.1 oz) (06/01/22 0700)  Body mass index is 23.74 kg/m².  Body surface area is 1.97 meters squared.    I/O last 3 completed  shifts:  In: 3890 [P.O.:480; NG/GT:2610; IV Piggyback:800]  Out: 500 [Stool:500]    Physical Exam  Vitals reviewed.   Constitutional:       Appearance: He is obese.   HENT:      Head: Normocephalic.      Mouth/Throat:      Mouth: Mucous membranes are moist.   Cardiovascular:      Rate and Rhythm: Regular rhythm.   Pulmonary:      Effort: Pulmonary effort is normal.      Comments: Trach collar  Abdominal:      Palpations: Abdomen is soft.   Neurological:      Mental Status: He is alert.       Significant Labs:  BMP:   Recent Labs   Lab 05/30/22  0708   GLU 64*   *   K 3.8   CL 93*   CO2 23   BUN 76*   CREATININE 2.13*   CALCIUM 8.9     CBC:   Recent Labs   Lab 05/30/22  0306   WBC 18.48*   RBC 2.13*   HGB 6.8*   HCT 21.3*   *   .0*   MCH 31.9*   MCHC 31.9*        Significant Imaging:  Labs: Reviewed

## 2022-06-01 NOTE — PROGRESS NOTES
Wound care note;  Patient skin was evaluated today.  Patient in bed, alert. Alexi Young FNP assessed wounds today. See notes.  Sacral wound improving, pink with scattered areas of tan slough, Reapplied NPWT to area. No odor noted  Left heel with no change, bone noted, dark tan tissue noted.  Right medial foot evolving with tan slough, edges moist, skin peeling,  Right amp 5 th with green drainage noted , mushy tan necrotic tissue,no improvement noted  Right lateral lower leg pink, bone noted, Right lateral ankle with pink tissue,  Cont Santly moist gauze to Left heel and RLE.  Cont turn protocol  Waffle boots  On low air loss mattress  Wound care team to follow

## 2022-06-01 NOTE — SUBJECTIVE & OBJECTIVE
Interval History: trach downsized to a #6XLT this morning. He is awake on exam, no complaints. Will start capping trach today     Objective:     Vital Signs (Most Recent):  Temp: 99.6 °F (37.6 °C) (06/01/22 0700)  Pulse: 99 (06/01/22 0832)  Resp: (!) 24 (06/01/22 0725)  BP: (!) 144/64 (06/01/22 0832)  SpO2: 98 % (06/01/22 0725)   Vital Signs (24h Range):  Temp:  [98.2 °F (36.8 °C)-100.1 °F (37.8 °C)] 99.6 °F (37.6 °C)  Pulse:  [] 99  Resp:  [10-36] 24  SpO2:  [98 %-100 %] 98 %  BP: (103-144)/(46-64) 144/64     Weight: 77.2 kg (170 lb 3.1 oz)  Body mass index is 23.74 kg/m².      Intake/Output Summary (Last 24 hours) at 6/1/2022 0928  Last data filed at 6/1/2022 0600  Gross per 24 hour   Intake 2500 ml   Output 200 ml   Net 2300 ml       Physical Exam  Vitals reviewed.   HENT:      Right Ear: External ear normal.      Left Ear: External ear normal.      Mouth/Throat:      Mouth: Mucous membranes are moist.   Eyes:      Conjunctiva/sclera: Conjunctivae normal.   Neck:      Trachea: Tracheostomy present.   Cardiovascular:      Rate and Rhythm: Normal rate and regular rhythm.   Pulmonary:      Effort: Pulmonary effort is normal.      Breath sounds: Normal breath sounds.   Abdominal:      General: Bowel sounds are normal.      Palpations: Abdomen is soft.   Musculoskeletal:         General: Normal range of motion.      Cervical back: Normal range of motion and neck supple.   Skin:     General: Skin is warm and dry.      Capillary Refill: Capillary refill takes less than 2 seconds.   Neurological:      Mental Status: He is alert. Mental status is at baseline.   Psychiatric:         Mood and Affect: Mood normal.       Vents:  Vent Mode: Standby (05/28/22 0415)  Ventilator Initiated: No (04/23/22 1934)  Set Rate: 0 BPM (05/18/22 0516)  Vt Set: 0 mL (05/18/22 0516)  Pressure Support: 15 cmH20 (05/22/22 0044)  PEEP/CPAP: 5 cmH20 (05/22/22 0044)  Oxygen Concentration (%): 35 (06/01/22 0725)  Peak Airway Pressure: 21  cmH2O (05/22/22 0044)  Plateau Pressure: 20 cmH20 (03/07/22 0500)  Total Ve: 7.8 mL (05/22/22 0044)  F/VT Ratio<105 (RSBI): (!) 22.64 (05/22/22 0044)    Lines/Drains/Airways       Central Venous Catheter Line  Duration                  Hemodialysis Catheter 12/30/21 0900 right internal jugular 152 days              Drain  Duration                  Colostomy 12/18/21 1030 Descending/sigmoid  days         Gastrostomy/Enterostomy 03/09/22 1436 Percutaneous endoscopic gastrostomy (PEG) midline feeding 83 days              Airway  Duration                  Surgical Airway 04/27/22 0856 Shiley;Other (Comment) Cuffed;Long 35 days              Peripheral Intravenous Line  Duration                  Peripheral IV - Single Lumen 05/27/22 0855 20 G Posterior;Right Hand 5 days                    Significant Labs:    CBC/Anemia Profile:  No results for input(s): WBC, HGB, HCT, PLT, MCV, RDW, IRON, FERRITIN, RETIC, FOLATE, [id number], OCCULTBLOOD in the last 48 hours.     Chemistries:  No results for input(s): NA, K, CL, CO2, BUN, CREATININE, CALCIUM, ALBUMIN, PROT, BILITOT, ALKPHOS, ALT, AST, GLUCOSE, MG, PHOS in the last 48 hours.    All pertinent labs within the past 24 hours have been reviewed.    Significant Imaging:  I have reviewed all pertinent imaging results/findings within the past 24 hours.

## 2022-06-01 NOTE — PROGRESS NOTES
[facility]  Pulmonology  Progress Note    Patient Name: Og Garcia  MRN: [id number]  Admission Date: 12/23/2021  Hospital Length of Stay: 160 days  Code Status: Prior  Attending Provider: Cecil Abernathy DO  Primary Care Provider: Hector Arndt DNP, FNP-C   Principal Problem: Denice gangrene    Subjective:     Interval History: trach downsized to a #6XLT this morning. He is awake on exam, no complaints. Will start capping trach today     Objective:     Vital Signs (Most Recent):  Temp: 99.6 °F (37.6 °C) (06/01/22 0700)  Pulse: 99 (06/01/22 0832)  Resp: (!) 24 (06/01/22 0725)  BP: (!) 144/64 (06/01/22 0832)  SpO2: 98 % (06/01/22 0725)   Vital Signs (24h Range):  Temp:  [98.2 °F (36.8 °C)-100.1 °F (37.8 °C)] 99.6 °F (37.6 °C)  Pulse:  [] 99  Resp:  [10-36] 24  SpO2:  [98 %-100 %] 98 %  BP: (103-144)/(46-64) 144/64     Weight: 77.2 kg (170 lb 3.1 oz)  Body mass index is 23.74 kg/m².      Intake/Output Summary (Last 24 hours) at 6/1/2022 0928  Last data filed at 6/1/2022 0600  Gross per 24 hour   Intake 2500 ml   Output 200 ml   Net 2300 ml       Physical Exam  Vitals reviewed.   HENT:      Right Ear: External ear normal.      Left Ear: External ear normal.      Mouth/Throat:      Mouth: Mucous membranes are moist.   Eyes:      Conjunctiva/sclera: Conjunctivae normal.   Neck:      Trachea: Tracheostomy present.   Cardiovascular:      Rate and Rhythm: Normal rate and regular rhythm.   Pulmonary:      Effort: Pulmonary effort is normal.      Breath sounds: Normal breath sounds.   Abdominal:      General: Bowel sounds are normal.      Palpations: Abdomen is soft.   Musculoskeletal:         General: Normal range of motion.      Cervical back: Normal range of motion and neck supple.   Skin:     General: Skin is warm and dry.      Capillary Refill: Capillary refill takes less than 2 seconds.   Neurological:      Mental Status: He is alert. Mental status is at baseline.    Psychiatric:         Mood and Affect: Mood normal.       Vents:  Vent Mode: Standby (05/28/22 0415)  Ventilator Initiated: No (04/23/22 1934)  Set Rate: 0 BPM (05/18/22 0516)  Vt Set: 0 mL (05/18/22 0516)  Pressure Support: 15 cmH20 (05/22/22 0044)  PEEP/CPAP: 5 cmH20 (05/22/22 0044)  Oxygen Concentration (%): 35 (06/01/22 0725)  Peak Airway Pressure: 21 cmH2O (05/22/22 0044)  Plateau Pressure: 20 cmH20 (03/07/22 0500)  Total Ve: 7.8 mL (05/22/22 0044)  F/VT Ratio<105 (RSBI): (!) 22.64 (05/22/22 0044)    Lines/Drains/Airways       Central Venous Catheter Line  Duration                  Hemodialysis Catheter 12/30/21 0900 right internal jugular 152 days              Drain  Duration                  Colostomy 12/18/21 1030 Descending/sigmoid  days         Gastrostomy/Enterostomy 03/09/22 1436 Percutaneous endoscopic gastrostomy (PEG) midline feeding 83 days              Airway  Duration                  Surgical Airway 04/27/22 0856 Shiley;Other (Comment) Cuffed;Long 35 days              Peripheral Intravenous Line  Duration                  Peripheral IV - Single Lumen 05/27/22 0855 20 G Posterior;Right Hand 5 days                    Significant Labs:    CBC/Anemia Profile:  No results for input(s): WBC, HGB, HCT, PLT, MCV, RDW, IRON, FERRITIN, RETIC, FOLATE, [id number], OCCULTBLOOD in the last 48 hours.     Chemistries:  No results for input(s): NA, K, CL, CO2, BUN, CREATININE, CALCIUM, ALBUMIN, PROT, BILITOT, ALKPHOS, ALT, AST, GLUCOSE, MG, PHOS in the last 48 hours.    All pertinent labs within the past 24 hours have been reviewed.    Significant Imaging:  I have reviewed all pertinent imaging results/findings within the past 24 hours.    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  Status post amputation    Osteomyelitis  Continue IV antibiotics per ID recommendations - stop date 07/01     Chronic respiratory failure  6/01- #6XLT placed today - will start capping 1hr TID as  tolerated       Pressure ulcer of sacral region, stage 4    Now with wound vac    Fever    Had a positive bone culture  Now on merrem and linezolid  ID note reviewed from 5/22-- continue IV antibiotics until 07/01/22      ESRD (end stage renal disease)  Started on HD 12/30   Continue with HD per nephrology  Now on epogen      Type 2 diabetes mellitus without complication, without long-term current use of insulin  Blood sugar is well controlled  Continue with levemir 25 units and sliding scale    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

## 2022-06-02 NOTE — SUBJECTIVE & OBJECTIVE
Interval History: The patient is sitting up in bed.  He is doing acceptable.     Review of patient's allergies indicates:  No Known Allergies  Current Facility-Administered Medications   Medication Frequency    0.9%  NaCl infusion (for blood administration) Q24H PRN    0.9%  NaCl infusion PRN    acetaminophen tablet 500 mg Q6H PRN    albuterol nebulizer solution 2.5 mg Q4H PRN    albuterol-ipratropium 2.5 mg-0.5 mg/3 mL nebulizer solution 3 mL Q6H    bisacodyL EC tablet 10 mg Daily PRN    collagenase ointment Daily PRN    dextrose 50% injection 12.5 g PRN    diltiaZEM tablet 90 mg Q6H    [START ON 6/3/2022] epoetin beth-epbx injection 10,000 Units Every Mon, Wed, Fri    glucagon (human recombinant) injection 1 mg PRN    guaiFENesin 100 mg/5 ml syrup 200 mg Q6H    heparin (porcine) injection 4,000 Units PRN    heparin (porcine) injection 5,000 Units Q8H    insulin aspart U-100 injection 1-10 Units Q6H    insulin detemir U-100 injection 25 Units QHS    linezolid 600 mg/300 mL IVPB 600 mg Q12H    meropenem (MERREM) 500 mg in sodium chloride 0.9 % 100 mL IVPB (MB+) Q24H    metoprolol tartrate (LOPRESSOR) split tablet 12.5 mg BID    pantoprazole suspension 40 mg Daily    sevelamer carbonate pwpk 0.8 g TID WM    simethicone chewable tablet 80 mg TID PRN    sodium chloride 0.9% bolus 250 mL PRN    tobramycin - pharmacy to dose pharmacy to manage frequency       Objective:     Vital Signs (Most Recent):  Temp: 99.7 °F (37.6 °C) (06/02/22 0700)  Pulse: 109 (06/02/22 1221)  Resp: (!) 26 (06/02/22 1221)  BP: (!) 103/51 (06/02/22 1150)  SpO2: 96 % (06/02/22 1221)  O2 Device (Oxygen Therapy): Trach Collar (06/02/22 0735)   Vital Signs (24h Range):  Temp:  [97.4 °F (36.3 °C)-99.7 °F (37.6 °C)] 99.7 °F (37.6 °C)  Pulse:  [] 109  Resp:  [15-55] 26  SpO2:  [89 %-100 %] 96 %  BP: ()/(47-85) 103/51     Weight: 77.2 kg (170 lb 3.1 oz) (06/01/22 0700)  Body mass index is 23.74 kg/m².  Body surface area is 1.97 meters  squared.    I/O last 3 completed shifts:  In: 2560 [P.O.:240; Other:500; NG/GT:1020; IV Piggyback:800]  Out: 2700 [Other:2000; Stool:700]    Physical Exam  Vitals reviewed.   HENT:      Head: Normocephalic.      Mouth/Throat:      Mouth: Mucous membranes are moist.   Cardiovascular:      Rate and Rhythm: Regular rhythm.      Pulses: Normal pulses.   Pulmonary:      Effort: Pulmonary effort is normal.   Abdominal:      Palpations: Abdomen is soft.   Neurological:      Mental Status: He is alert.       Significant Labs:  BMP:   Recent Labs   Lab 05/30/22  0708   GLU 64*   *   K 3.8   CL 93*   CO2 23   BUN 76*   CREATININE 2.13*   CALCIUM 8.9     CBC:   Recent Labs   Lab 05/30/22  0306   WBC 18.48*   RBC 2.13*   HGB 6.8*   HCT 21.3*   *   .0*   MCH 31.9*   MCHC 31.9*        Significant Imaging:  Labs: Reviewed

## 2022-06-02 NOTE — SUBJECTIVE & OBJECTIVE
Interval History:  No history    Objective:     Vital Signs (Most Recent):  Temp: 97.4 °F (36.3 °C) (06/02/22 0300)  Pulse: 106 (06/02/22 0215)  Resp: (!) 21 (06/02/22 0215)  BP: 136/65 (06/02/22 0215)  SpO2: 99 % (06/02/22 0215)   Vital Signs (24h Range):  Temp:  [97.4 °F (36.3 °C)-99.6 °F (37.6 °C)] 97.4 °F (36.3 °C)  Pulse:  [] 106  Resp:  [12-55] 21  SpO2:  [89 %-100 %] 99 %  BP: ()/(47-85) 136/65     Weight: 77.2 kg (170 lb 3.1 oz)  Body mass index is 23.74 kg/m².      Intake/Output Summary (Last 24 hours) at 6/2/2022 0547  Last data filed at 6/1/2022 2015  Gross per 24 hour   Intake 1920 ml   Output 2300 ml   Net -380 ml       Physical Exam  Vitals reviewed.   Constitutional:       Appearance: Normal appearance.      Interventions: He is not intubated.  HENT:      Head: Normocephalic and atraumatic.      Nose: Nose normal.      Mouth/Throat:      Mouth: Mucous membranes are dry.      Pharynx: Oropharynx is clear.   Eyes:      Extraocular Movements: Extraocular movements intact.      Conjunctiva/sclera: Conjunctivae normal.      Pupils: Pupils are equal, round, and reactive to light.   Cardiovascular:      Rate and Rhythm: Normal rate.      Heart sounds: Normal heart sounds. No murmur heard.  Pulmonary:      Effort: Pulmonary effort is normal. He is not intubated.      Breath sounds: Normal breath sounds.   Abdominal:      General: Abdomen is flat. Bowel sounds are normal.      Palpations: Abdomen is soft.   Musculoskeletal:         General: Normal range of motion.      Cervical back: Normal range of motion and neck supple.      Right lower leg: No edema.      Left lower leg: No edema.   Skin:     General: Skin is warm and dry.      Capillary Refill: Capillary refill takes less than 2 seconds.   Neurological:      General: No focal deficit present.      Mental Status: He is alert and oriented to person, place, and time.   Psychiatric:         Mood and Affect: Mood normal.         Behavior: Behavior  normal.       Vents:  Vent Mode: Standby (05/28/22 0415)  Ventilator Initiated: No (04/23/22 1934)  Set Rate: 0 BPM (05/18/22 0516)  Vt Set: 0 mL (05/18/22 0516)  Pressure Support: 15 cmH20 (05/22/22 0044)  PEEP/CPAP: 5 cmH20 (05/22/22 0044)  Oxygen Concentration (%): 35 (06/02/22 0415)  Peak Airway Pressure: 21 cmH2O (05/22/22 0044)  Plateau Pressure: 20 cmH20 (03/07/22 0500)  Total Ve: 7.8 mL (05/22/22 0044)  F/VT Ratio<105 (RSBI): (!) 22.64 (05/22/22 0044)    Lines/Drains/Airways       Central Venous Catheter Line  Duration                  Hemodialysis Catheter 12/30/21 0900 right internal jugular 153 days              Drain  Duration                  Colostomy 12/18/21 1030 Descending/sigmoid  days         Gastrostomy/Enterostomy 03/09/22 1436 Percutaneous endoscopic gastrostomy (PEG) midline feeding 84 days              Airway  Duration                  Surgical Airway 04/27/22 0856 Marychuy;Other (Comment) Cuffed;Long 35 days              Peripheral Intravenous Line  Duration                  Peripheral IV - Single Lumen 05/27/22 0855 20 G Posterior;Right Hand 5 days                    Significant Labs:    CBC/Anemia Profile:  No results for input(s): WBC, HGB, HCT, PLT, MCV, RDW, IRON, FERRITIN, RETIC, FOLATE, [id number], OCCULTBLOOD in the last 48 hours.     Chemistries:  No results for input(s): NA, K, CL, CO2, BUN, CREATININE, CALCIUM, ALBUMIN, PROT, BILITOT, ALKPHOS, ALT, AST, GLUCOSE, MG, PHOS in the last 48 hours.    Recent Lab Results         06/02/22  0001   06/01/22  1649   06/01/22  1136   06/01/22  1134        Hep A IgM       Non-Reactive       Hep B C IgM       Non-Reactive       Hepatitis B Surface Ag       Non-Reactive       Hepatitis C Ab       Non-Reactive       POC Glucose 136   107   100                 Significant Imaging:  I have reviewed all pertinent imaging results/findings within the past 24 hours.

## 2022-06-02 NOTE — PROGRESS NOTES
[facility]  Nephrology  Progress Note    Patient Name: Og Garcia  MRN: [id number]  Admission Date: 12/23/2021  Hospital Length of Stay: 161 days  Attending Provider: Cecil Abernathy DO   Primary Care Physician: Hector Arndt DNP, FNP-C  Principal Problem:Denice gangrene    Subjective:     HPI: The patient is known from the previous nephrology consult at [facility].  He is no at Specialty and continues to need dialysis support.      Interval History: The patient is sitting up in bed.  He is doing acceptable.     Review of patient's allergies indicates:  No Known Allergies  Current Facility-Administered Medications   Medication Frequency    0.9%  NaCl infusion (for blood administration) Q24H PRN    0.9%  NaCl infusion PRN    acetaminophen tablet 500 mg Q6H PRN    albuterol nebulizer solution 2.5 mg Q4H PRN    albuterol-ipratropium 2.5 mg-0.5 mg/3 mL nebulizer solution 3 mL Q6H    bisacodyL EC tablet 10 mg Daily PRN    collagenase ointment Daily PRN    dextrose 50% injection 12.5 g PRN    diltiaZEM tablet 90 mg Q6H    [START ON 6/3/2022] epoetin beth-epbx injection 10,000 Units Every Mon, Wed, Fri    glucagon (human recombinant) injection 1 mg PRN    guaiFENesin 100 mg/5 ml syrup 200 mg Q6H    heparin (porcine) injection 4,000 Units PRN    heparin (porcine) injection 5,000 Units Q8H    insulin aspart U-100 injection 1-10 Units Q6H    insulin detemir U-100 injection 25 Units QHS    linezolid 600 mg/300 mL IVPB 600 mg Q12H    meropenem (MERREM) 500 mg in sodium chloride 0.9 % 100 mL IVPB (MB+) Q24H    metoprolol tartrate (LOPRESSOR) split tablet 12.5 mg BID    pantoprazole suspension 40 mg Daily    sevelamer carbonate pwpk 0.8 g TID WM    simethicone chewable tablet 80 mg TID PRN    sodium chloride 0.9% bolus 250 mL PRN    tobramycin - pharmacy to dose pharmacy to manage frequency       Objective:     Vital Signs (Most Recent):  Temp: 99.7 °F (37.6 °C) (06/02/22  0700)  Pulse: 109 (06/02/22 1221)  Resp: (!) 26 (06/02/22 1221)  BP: (!) 103/51 (06/02/22 1150)  SpO2: 96 % (06/02/22 1221)  O2 Device (Oxygen Therapy): Trach Collar (06/02/22 0735)   Vital Signs (24h Range):  Temp:  [97.4 °F (36.3 °C)-99.7 °F (37.6 °C)] 99.7 °F (37.6 °C)  Pulse:  [] 109  Resp:  [15-55] 26  SpO2:  [89 %-100 %] 96 %  BP: ()/(47-85) 103/51     Weight: 77.2 kg (170 lb 3.1 oz) (06/01/22 0700)  Body mass index is 23.74 kg/m².  Body surface area is 1.97 meters squared.    I/O last 3 completed shifts:  In: 2560 [P.O.:240; Other:500; NG/GT:1020; IV Piggyback:800]  Out: 2700 [Other:2000; Stool:700]    Physical Exam  Vitals reviewed.   HENT:      Head: Normocephalic.      Mouth/Throat:      Mouth: Mucous membranes are moist.   Cardiovascular:      Rate and Rhythm: Regular rhythm.      Pulses: Normal pulses.   Pulmonary:      Effort: Pulmonary effort is normal.   Abdominal:      Palpations: Abdomen is soft.   Neurological:      Mental Status: He is alert.       Significant Labs:  BMP:   Recent Labs   Lab 05/30/22  0708   GLU 64*   *   K 3.8   CL 93*   CO2 23   BUN 76*   CREATININE 2.13*   CALCIUM 8.9     CBC:   Recent Labs   Lab 05/30/22  0306   WBC 18.48*   RBC 2.13*   HGB 6.8*   HCT 21.3*   *   .0*   MCH 31.9*   MCHC 31.9*        Significant Imaging:  Labs: Reviewed    Assessment/Plan:     ESRD (end stage renal disease)  6/1/2022  Continue with dialysis as scheduled  6/2/2022  Dialysis as scheduled      Type 2 diabetes mellitus without complication, without long-term current use of insulin  Continue current treatment  Chronic condition  Glucoses are acceptable.        Thank you for your consult. I will follow-up with patient. Please contact us if you have any additional questions.    Edwin Pryor Jr, MD  Nephrology  [facility]

## 2022-06-02 NOTE — PROGRESS NOTES
[facility]  Pulmonology  Progress Note    Patient Name: Og Garcia  MRN: [id number]  Admission Date: 12/23/2021  Hospital Length of Stay: 161 days  Code Status: Prior  Attending Provider: Cecil Abernathy DO  Primary Care Provider: Hector Arndt DNP, FNP-C   Principal Problem: Denice gangrene    Subjective:     Interval History:  No history    Objective:     Vital Signs (Most Recent):  Temp: 97.4 °F (36.3 °C) (06/02/22 0300)  Pulse: 106 (06/02/22 0215)  Resp: (!) 21 (06/02/22 0215)  BP: 136/65 (06/02/22 0215)  SpO2: 99 % (06/02/22 0215)   Vital Signs (24h Range):  Temp:  [97.4 °F (36.3 °C)-99.6 °F (37.6 °C)] 97.4 °F (36.3 °C)  Pulse:  [] 106  Resp:  [12-55] 21  SpO2:  [89 %-100 %] 99 %  BP: ()/(47-85) 136/65     Weight: 77.2 kg (170 lb 3.1 oz)  Body mass index is 23.74 kg/m².      Intake/Output Summary (Last 24 hours) at 6/2/2022 0547  Last data filed at 6/1/2022 2015  Gross per 24 hour   Intake 1920 ml   Output 2300 ml   Net -380 ml       Physical Exam  Vitals reviewed.   Constitutional:       Appearance: Normal appearance.      Interventions: He is not intubated.  HENT:      Head: Normocephalic and atraumatic.      Nose: Nose normal.      Mouth/Throat:      Mouth: Mucous membranes are dry.      Pharynx: Oropharynx is clear.   Eyes:      Extraocular Movements: Extraocular movements intact.      Conjunctiva/sclera: Conjunctivae normal.      Pupils: Pupils are equal, round, and reactive to light.   Cardiovascular:      Rate and Rhythm: Normal rate.      Heart sounds: Normal heart sounds. No murmur heard.  Pulmonary:      Effort: Pulmonary effort is normal. He is not intubated.      Breath sounds: Normal breath sounds.   Abdominal:      General: Abdomen is flat. Bowel sounds are normal.      Palpations: Abdomen is soft.   Musculoskeletal:         General: Normal range of motion.      Cervical back: Normal range of motion and neck supple.      Right lower leg: No edema.       Left lower leg: No edema.   Skin:     General: Skin is warm and dry.      Capillary Refill: Capillary refill takes less than 2 seconds.   Neurological:      General: No focal deficit present.      Mental Status: He is alert and oriented to person, place, and time.   Psychiatric:         Mood and Affect: Mood normal.         Behavior: Behavior normal.       Vents:  Vent Mode: Standby (05/28/22 0415)  Ventilator Initiated: No (04/23/22 1934)  Set Rate: 0 BPM (05/18/22 0516)  Vt Set: 0 mL (05/18/22 0516)  Pressure Support: 15 cmH20 (05/22/22 0044)  PEEP/CPAP: 5 cmH20 (05/22/22 0044)  Oxygen Concentration (%): 35 (06/02/22 0415)  Peak Airway Pressure: 21 cmH2O (05/22/22 0044)  Plateau Pressure: 20 cmH20 (03/07/22 0500)  Total Ve: 7.8 mL (05/22/22 0044)  F/VT Ratio<105 (RSBI): (!) 22.64 (05/22/22 0044)    Lines/Drains/Airways       Central Venous Catheter Line  Duration                  Hemodialysis Catheter 12/30/21 0900 right internal jugular 153 days              Drain  Duration                  Colostomy 12/18/21 1030 Descending/sigmoid  days         Gastrostomy/Enterostomy 03/09/22 1436 Percutaneous endoscopic gastrostomy (PEG) midline feeding 84 days              Airway  Duration                  Surgical Airway 04/27/22 0856 Shiley;Other (Comment) Cuffed;Long 35 days              Peripheral Intravenous Line  Duration                  Peripheral IV - Single Lumen 05/27/22 0855 20 G Posterior;Right Hand 5 days                    Significant Labs:    CBC/Anemia Profile:  No results for input(s): WBC, HGB, HCT, PLT, MCV, RDW, IRON, FERRITIN, RETIC, FOLATE, [id number], OCCULTBLOOD in the last 48 hours.     Chemistries:  No results for input(s): NA, K, CL, CO2, BUN, CREATININE, CALCIUM, ALBUMIN, PROT, BILITOT, ALKPHOS, ALT, AST, GLUCOSE, MG, PHOS in the last 48 hours.    Recent Lab Results         06/02/22  0001   06/01/22  1649   06/01/22  1136   06/01/22  1134        Hep A IgM       Non-Reactive       Hep  B C IgM       Non-Reactive       Hepatitis B Surface Ag       Non-Reactive       Hepatitis C Ab       Non-Reactive       POC Glucose 136   107   100                 Significant Imaging:  I have reviewed all pertinent imaging results/findings within the past 24 hours.    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

## 2022-06-02 NOTE — ASSESSMENT & PLAN NOTE
With patient's mental status I am not sure will be able to pull trach needs look for nursing home, off ventilator for several days

## 2022-06-03 NOTE — SUBJECTIVE & OBJECTIVE
Interval History: Seen on HD. On TC; unsure if his trach has been capped. Elevated Temp overnight     Objective:     Vital Signs (Most Recent):  Temp: 100.3 °F (37.9 °C) (06/03/22 0945)  Pulse: 97 (06/03/22 1045)  Resp: (!) 29 (06/03/22 1045)  BP: (!) 112/56 (06/03/22 1045)  SpO2: 100 % (06/03/22 1045)   Vital Signs (24h Range):  Temp:  [98.5 °F (36.9 °C)-101.8 °F (38.8 °C)] 100.3 °F (37.9 °C)  Pulse:  [] 97  Resp:  [8-52] 29  SpO2:  [93 %-100 %] 100 %  BP: ()/(47-74) 112/56     Weight: 78.2 kg (172 lb 6.4 oz)  Body mass index is 24.04 kg/m².      Intake/Output Summary (Last 24 hours) at 6/3/2022 1059  Last data filed at 6/3/2022 0602  Gross per 24 hour   Intake 2310 ml   Output 300 ml   Net 2010 ml       Physical Exam  Vitals reviewed.   HENT:      Right Ear: External ear normal.      Left Ear: External ear normal.      Mouth/Throat:      Mouth: Mucous membranes are moist.   Eyes:      Conjunctiva/sclera: Conjunctivae normal.   Neck:      Trachea: Tracheostomy present.   Cardiovascular:      Rate and Rhythm: Normal rate and regular rhythm.   Pulmonary:      Effort: Pulmonary effort is normal.      Breath sounds: Normal breath sounds.   Abdominal:      General: Bowel sounds are normal.      Palpations: Abdomen is soft.   Musculoskeletal:         General: Normal range of motion.      Cervical back: Normal range of motion and neck supple.   Skin:     General: Skin is warm and dry.      Capillary Refill: Capillary refill takes less than 2 seconds.   Neurological:      Mental Status: He is alert. Mental status is at baseline.   Psychiatric:         Mood and Affect: Mood normal.       Vents:  Vent Mode: Standby (05/28/22 0415)  Ventilator Initiated: No (04/23/22 1934)  Set Rate: 0 BPM (05/18/22 0516)  Vt Set: 0 mL (05/18/22 0516)  Pressure Support: 15 cmH20 (05/22/22 0044)  PEEP/CPAP: 5 cmH20 (05/22/22 0044)  Oxygen Concentration (%): 35 (06/03/22 0343)  Peak Airway Pressure: 21 cmH2O (05/22/22 0044)  Plateau  Pressure: 20 cmH20 (03/07/22 0500)  Total Ve: 7.8 mL (05/22/22 0044)  F/VT Ratio<105 (RSBI): (!) 22.64 (05/22/22 0044)    Lines/Drains/Airways       Central Venous Catheter Line  Duration                  Hemodialysis Catheter 12/30/21 0900 right internal jugular 155 days              Drain  Duration                  Colostomy 12/18/21 1030 Descending/sigmoid  days         Gastrostomy/Enterostomy 03/09/22 1436 Percutaneous endoscopic gastrostomy (PEG) midline feeding 85 days              Airway  Duration                  Surgical Airway 04/27/22 0856 Marychuy;Other (Comment) Cuffed;Long 37 days              Peripheral Intravenous Line  Duration                  Peripheral IV - Single Lumen 06/02/22 1520 22 G Left;Posterior Hand <1 day                    Significant Labs:    CBC/Anemia Profile:  Recent Labs   Lab 06/03/22  0557   WBC 27.17*   HGB 7.6*   HCT 22.6*   *   MCV 94.2   RDW 14.6*        Chemistries:  Recent Labs   Lab 06/03/22  0557   *   K 3.6   CL 96*   CO2 26   BUN 57*   CREATININE 1.65*   CALCIUM 8.8       All pertinent labs within the past 24 hours have been reviewed.    Significant Imaging:  I have reviewed all pertinent imaging results/findings within the past 24 hours.

## 2022-06-03 NOTE — PROGRESS NOTES
[facility]  Pulmonology  Progress Note    Patient Name: Og Garcia  MRN: [id number]  Admission Date: 12/23/2021  Hospital Length of Stay: 162 days  Code Status: Prior  Attending Provider: Cecil Abernathy DO  Primary Care Provider: Hector Arndt DNP, FNP-C   Principal Problem: Denice gangrene    Subjective:     Interval History: Seen on HD. On TC; unsure if his trach has been capped. Elevated Temp overnight     Objective:     Vital Signs (Most Recent):  Temp: 100.3 °F (37.9 °C) (06/03/22 0945)  Pulse: 97 (06/03/22 1045)  Resp: (!) 29 (06/03/22 1045)  BP: (!) 112/56 (06/03/22 1045)  SpO2: 100 % (06/03/22 1045)   Vital Signs (24h Range):  Temp:  [98.5 °F (36.9 °C)-101.8 °F (38.8 °C)] 100.3 °F (37.9 °C)  Pulse:  [] 97  Resp:  [8-52] 29  SpO2:  [93 %-100 %] 100 %  BP: ()/(47-74) 112/56     Weight: 78.2 kg (172 lb 6.4 oz)  Body mass index is 24.04 kg/m².      Intake/Output Summary (Last 24 hours) at 6/3/2022 1059  Last data filed at 6/3/2022 0602  Gross per 24 hour   Intake 2310 ml   Output 300 ml   Net 2010 ml       Physical Exam  Vitals reviewed.   HENT:      Right Ear: External ear normal.      Left Ear: External ear normal.      Mouth/Throat:      Mouth: Mucous membranes are moist.   Eyes:      Conjunctiva/sclera: Conjunctivae normal.   Neck:      Trachea: Tracheostomy present.   Cardiovascular:      Rate and Rhythm: Normal rate and regular rhythm.   Pulmonary:      Effort: Pulmonary effort is normal.      Breath sounds: Normal breath sounds.   Abdominal:      General: Bowel sounds are normal.      Palpations: Abdomen is soft.   Musculoskeletal:         General: Normal range of motion.      Cervical back: Normal range of motion and neck supple.   Skin:     General: Skin is warm and dry.      Capillary Refill: Capillary refill takes less than 2 seconds.   Neurological:      Mental Status: He is alert. Mental status is at baseline.   Psychiatric:         Mood and Affect:  Mood normal.       Vents:  Vent Mode: Standby (05/28/22 0415)  Ventilator Initiated: No (04/23/22 1934)  Set Rate: 0 BPM (05/18/22 0516)  Vt Set: 0 mL (05/18/22 0516)  Pressure Support: 15 cmH20 (05/22/22 0044)  PEEP/CPAP: 5 cmH20 (05/22/22 0044)  Oxygen Concentration (%): 35 (06/03/22 0343)  Peak Airway Pressure: 21 cmH2O (05/22/22 0044)  Plateau Pressure: 20 cmH20 (03/07/22 0500)  Total Ve: 7.8 mL (05/22/22 0044)  F/VT Ratio<105 (RSBI): (!) 22.64 (05/22/22 0044)    Lines/Drains/Airways       Central Venous Catheter Line  Duration                  Hemodialysis Catheter 12/30/21 0900 right internal jugular 155 days              Drain  Duration                  Colostomy 12/18/21 1030 Descending/sigmoid  days         Gastrostomy/Enterostomy 03/09/22 1436 Percutaneous endoscopic gastrostomy (PEG) midline feeding 85 days              Airway  Duration                  Surgical Airway 04/27/22 0856 Shiley;Other (Comment) Cuffed;Long 37 days              Peripheral Intravenous Line  Duration                  Peripheral IV - Single Lumen 06/02/22 1520 22 G Left;Posterior Hand <1 day                    Significant Labs:    CBC/Anemia Profile:  Recent Labs   Lab 06/03/22  0557   WBC 27.17*   HGB 7.6*   HCT 22.6*   *   MCV 94.2   RDW 14.6*        Chemistries:  Recent Labs   Lab 06/03/22  0557   *   K 3.6   CL 96*   CO2 26   BUN 57*   CREATININE 1.65*   CALCIUM 8.8       All pertinent labs within the past 24 hours have been reviewed.    Significant Imaging:  I have reviewed all pertinent imaging results/findings within the past 24 hours.    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

## 2022-06-03 NOTE — PLAN OF CARE
Problem: Glycemic Control Impaired (Sepsis/Septic Shock)  Goal: Blood Glucose Level Within Desired Range  Outcome: Ongoing, Progressing  Intervention: Optimize Glycemic Control  Flowsheets (Taken 6/3/2022 1700)  Glycemic Management: blood glucose monitored

## 2022-06-03 NOTE — ASSESSMENT & PLAN NOTE
Had a positive bone culture  ID note reviewed from 5/22-- continue IV antibiotics until 07/01/22  101.8 overnight - plan as above

## 2022-06-03 NOTE — PLAN OF CARE
SS working on placement for pt. So far has not found any facilities that can take a patient with trach that is requiring dialysis if they are needing oxygen. SS will follow

## 2022-06-03 NOTE — ASSESSMENT & PLAN NOTE
Continue IV antibiotics per ID recommendations - stop date 07/01   Temp 101.8 overnight   He has been treated for multidrug resistant bacterias   - continue current treatment for now-- can repeat all his cultures if he spikes another temp - ID returns 06/06

## 2022-06-03 NOTE — ASSESSMENT & PLAN NOTE
Currently on trach collar- has been off vent over a week. Trach downsized to #6 -- will try to cap as tolerated

## 2022-06-04 NOTE — PLAN OF CARE
Problem: Gas Exchange Impaired  Goal: Optimal Gas Exchange  6/4/2022 0717 by Carmel López, RRT  Outcome: Ongoing, Progressing  6/3/2022 1813 by Carmel López, RRT  Outcome: Ongoing, Progressing

## 2022-06-04 NOTE — PROGRESS NOTES
[facility]  Nephrology  Progress Note    Patient Name: Og Garcia  MRN: [id number]  Admission Date: 12/23/2021  Hospital Length of Stay: 163 days  Attending Provider: Cecil Abernathy DO   Primary Care Physician: Hector Arndt DNP, FNP-C  Principal Problem:Denice gangrene    Consults  Subjective:     Interval History: Mr. Garcia is seen in f/u of his ESRD. Dialyzed yesterday    Review of patient's allergies indicates:  No Known Allergies  Current Facility-Administered Medications   Medication Frequency    0.9%  NaCl infusion (for blood administration) Q24H PRN    0.9%  NaCl infusion PRN    acetaminophen tablet 500 mg Q6H PRN    albuterol nebulizer solution 2.5 mg Q4H PRN    albuterol-ipratropium 2.5 mg-0.5 mg/3 mL nebulizer solution 3 mL Q6H    bisacodyL EC tablet 10 mg Daily PRN    collagenase ointment Daily PRN    dextrose 50% injection 12.5 g PRN    diltiaZEM tablet 90 mg Q6H    epoetin beth-epbx injection 10,000 Units Every Mon, Wed, Fri    glucagon (human recombinant) injection 1 mg PRN    guaiFENesin 100 mg/5 ml syrup 200 mg Q6H    heparin (porcine) injection 4,000 Units PRN    heparin (porcine) injection 5,000 Units Q8H    insulin aspart U-100 injection 1-10 Units Q6H    insulin detemir U-100 injection 15 Units QHS    linezolid 600 mg/300 mL IVPB 600 mg Q12H    meropenem (MERREM) 500 mg in sodium chloride 0.9 % 100 mL IVPB (MB+) Q24H    metoprolol tartrate (LOPRESSOR) split tablet 12.5 mg BID    pantoprazole suspension 40 mg Daily    sevelamer carbonate pwpk 0.8 g TID WM    simethicone chewable tablet 80 mg TID PRN    sodium chloride 0.9% bolus 250 mL PRN    tobramycin - pharmacy to dose pharmacy to manage frequency       Objective:     Vital Signs (Most Recent):  Temp: 98.9 °F (37.2 °C) (06/04/22 0400)  Pulse: 99 (06/04/22 1100)  Resp: (!) 38 (06/04/22 1100)  BP: 118/60 (06/04/22 1100)  SpO2: 100 % (06/04/22 1100)  O2 Device (Oxygen Therapy): nasal  cannula (06/04/22 0716) Vital Signs (24h Range):  Temp:  [98.9 °F (37.2 °C)-100.2 °F (37.9 °C)] 98.9 °F (37.2 °C)  Pulse:  [] 99  Resp:  [18-45] 38  SpO2:  [96 %-100 %] 100 %  BP: ()/(43-81) 118/60     Weight: 76 kg (167 lb 8.8 oz) (06/04/22 0400)  Body mass index is 23.37 kg/m².  Body surface area is 1.95 meters squared.    I/O last 3 completed shifts:  In: 2500 [P.O.:240; NG/GT:1560; IV Piggyback:700]  Out: 1800 [Other:1500; Stool:300]    Physical Exam Alert today. Turns head toward me upon my entry. Chest clear    Significant Labs:sure  BMP:   Recent Labs   Lab 06/03/22  0557   GLU 59*   *   K 3.6   CL 96*   CO2 26   BUN 57*   CREATININE 1.65*   CALCIUM 8.8     All labs within the past 24 hours have been reviewed.    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] 12/13/2021 Yes    Osteomyelitis [M86.9] 05/19/2022 Clinically Undetermined    Ischemic ulcer of right foot with necrosis of bone [L97.514] 05/19/2022 No    Ulcer of right lower extremity with necrosis of bone [L97.914] 05/17/2022 No    Tachycardia [R00.0] 04/06/2022 No    Chronic respiratory failure [J96.10] 04/06/2022 Yes    Pressure ulcer of sacral region, stage 4 [L89.154]  Yes    Fever [R50.9] 02/26/2022 No    ESRD (end stage renal disease) [N18.6] 02/23/2022 Yes    Acute blood loss anemia [D62] 12/25/2021 No    Type 2 diabetes mellitus without complication, without long-term current use of insulin [E11.9] 12/25/2021 Yes    Hypertension [I10] 09/02/2021 Yes    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) [I69.398, R26.9] 09/02/2021 Not Applicable      Problems Resolved During this Admission:    Diagnosis Date Noted Date Resolved POA    Hypotension [I95.9] 04/10/2022 04/22/2022 Unknown    Vomiting [R11.10] 02/26/2022 03/02/2022 No    Open wound of right hand without foreign body  [S61.401A]  05/19/2022 No    Pressure ulcer of left heel, unstageable [L89.620]  05/19/2022 Unknown    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    Thank you for your consult. I will follow-up with patient. Please contact us if you have any additional questions.    Theo Coe MD  Nephrology  [facility]

## 2022-06-04 NOTE — SUBJECTIVE & OBJECTIVE
Interval History: Temp of 100.3 over past 24 hours. Remains on trach collar.    Objective:     Vital Signs (Most Recent):  Temp: 98.9 °F (37.2 °C) (06/04/22 0400)  Pulse: 99 (06/04/22 0716)  Resp: (!) 24 (06/04/22 0716)  BP: (!) 99/45 (06/04/22 0500)  SpO2: 100 % (06/04/22 0716)   Vital Signs (24h Range):  Temp:  [98.9 °F (37.2 °C)-100.2 °F (37.9 °C)] 98.9 °F (37.2 °C)  Pulse:  [] 99  Resp:  [11-45] 24  SpO2:  [96 %-100 %] 100 %  BP: ()/(45-81) 99/45     Weight: 76 kg (167 lb 8.8 oz)  Body mass index is 23.37 kg/m².      Intake/Output Summary (Last 24 hours) at 6/4/2022 1011  Last data filed at 6/3/2022 1737  Gross per 24 hour   Intake 400 ml   Output 1500 ml   Net -1100 ml       Physical Exam  Vitals reviewed.   Constitutional:       General: He is not in acute distress.     Appearance: He is ill-appearing.      Comments: Chronically ill   HENT:      Head:      Comments: Right temporal deformity     Nose: Nose normal.      Mouth/Throat:      Mouth: Mucous membranes are moist.   Eyes:      Comments: enucleated right eye   Neck:      Comments: Tracheostomy in place  Cardiovascular:      Rate and Rhythm: Normal rate and regular rhythm.      Pulses: Normal pulses.      Heart sounds: Normal heart sounds.   Pulmonary:      Effort: No respiratory distress.      Breath sounds: No stridor. No wheezing, rhonchi or rales.   Abdominal:      Palpations: Abdomen is soft.      Tenderness: There is no guarding.      Comments: PEG   Musculoskeletal:         General: No swelling or deformity.      Cervical back: Neck supple.      Right lower leg: No edema.      Left lower leg: No edema.      Comments: Thenar wasting.    Lymphadenopathy:      Cervical: No cervical adenopathy.   Skin:     General: Skin is warm and dry.   Neurological:      Mental Status: He is alert.      Cranial Nerves: No cranial nerve deficit.      Motor: Weakness present.       Vents:  Vent Mode: Standby (05/28/22 0415)  Ventilator Initiated: No  (04/23/22 1934)  Set Rate: 0 BPM (05/18/22 0516)  Vt Set: 0 mL (05/18/22 0516)  Pressure Support: 15 cmH20 (05/22/22 0044)  PEEP/CPAP: 5 cmH20 (05/22/22 0044)  Oxygen Concentration (%): 28 (06/04/22 0045)  Peak Airway Pressure: 21 cmH2O (05/22/22 0044)  Plateau Pressure: 20 cmH20 (03/07/22 0500)  Total Ve: 7.8 mL (05/22/22 0044)  F/VT Ratio<105 (RSBI): (!) 22.64 (05/22/22 0044)    Lines/Drains/Airways       Central Venous Catheter Line  Duration                  Hemodialysis Catheter 12/30/21 0900 right internal jugular 156 days              Drain  Duration                  Colostomy 12/18/21 1030 Descending/sigmoid  days         Gastrostomy/Enterostomy 03/09/22 1436 Percutaneous endoscopic gastrostomy (PEG) midline feeding 86 days              Airway  Duration                  Surgical Airway 04/27/22 0856 Marychuy;Other (Comment) Cuffed;Long 38 days              Peripheral Intravenous Line  Duration                  Peripheral IV - Single Lumen 06/03/22 1527 18 G Anterior;Right Forearm <1 day                    Significant Labs:    CBC/Anemia Profile:  Recent Labs   Lab 06/03/22  0557   WBC 27.17*   HGB 7.6*   HCT 22.6*   *   MCV 94.2   RDW 14.6*        Chemistries:  Recent Labs   Lab 06/03/22  0557   *   K 3.6   CL 96*   CO2 26   BUN 57*   CREATININE 1.65*   CALCIUM 8.8       All pertinent labs within the past 24 hours have been reviewed.    Significant Imaging:  I have reviewed all pertinent imaging results/findings within the past 24 hours.

## 2022-06-04 NOTE — PLAN OF CARE
Problem: Glycemic Control Impaired (Sepsis/Septic Shock)  Goal: Blood Glucose Level Within Desired Range  Outcome: Ongoing, Progressing  Intervention: Optimize Glycemic Control  Flowsheets (Taken 6/4/2022 1506)  Glycemic Management:   blood glucose monitored   insulin dose matched to carbohydrate intake   supplemental insulin given

## 2022-06-04 NOTE — ASSESSMENT & PLAN NOTE
- Continue IV antibiotics per ID recommendations - stop date 07/01   - He has been treated for multidrug resistant bacteria   - continue current treatment for now-- can repeat all his cultures if he spikes another temp - ID returns 06/06

## 2022-06-04 NOTE — PLAN OF CARE
Patient opens eyes spontaneously and to voice. Nad noted , Trach capped and then later placed on trach collar pe respiratory nd . Coarse lung sounds  moderate secretions requires freq suctioning. Peg tupe TF infusing 60ml/hr w/o diff.Wound Vac to sacral 125mmhg.continoius. motor running appropriately. Seal intact.   Dsg to austyn feet ext elevated off bed, bed alarm on. Pt did have freq episodes of hypotension, SBP 90's a SBP 80's map fluctuating 50-60 bp and anti rhythm held per paramater oders  and BP rebound after tube feeding paused and patient placed in semi trendelberg. Patient Midnight bloodsugar 61 treated with 1/2 amp D50

## 2022-06-04 NOTE — ASSESSMENT & PLAN NOTE
-Currently on nasal cannula  - has been off vent over a week. Trach downsized to #6 -- will try to cap as tolerated

## 2022-06-04 NOTE — PROGRESS NOTES
[facility]  Pulmonology  Progress Note    Patient Name: Og Garcia  MRN: [id number]  Admission Date: 12/23/2021  Hospital Length of Stay: 163 days  Code Status: Prior  Attending Provider: Cecil Abernathy DO  Primary Care Provider: Hector Arndt DNP, FNP-C   Principal Problem: Denice gangrene    Subjective:     Interval History: Temp of 100.3 over past 24 hours. Remains on trach collar.    Objective:     Vital Signs (Most Recent):  Temp: 98.9 °F (37.2 °C) (06/04/22 0400)  Pulse: 99 (06/04/22 0716)  Resp: (!) 24 (06/04/22 0716)  BP: (!) 99/45 (06/04/22 0500)  SpO2: 100 % (06/04/22 0716)   Vital Signs (24h Range):  Temp:  [98.9 °F (37.2 °C)-100.2 °F (37.9 °C)] 98.9 °F (37.2 °C)  Pulse:  [] 99  Resp:  [11-45] 24  SpO2:  [96 %-100 %] 100 %  BP: ()/(45-81) 99/45     Weight: 76 kg (167 lb 8.8 oz)  Body mass index is 23.37 kg/m².      Intake/Output Summary (Last 24 hours) at 6/4/2022 1011  Last data filed at 6/3/2022 1737  Gross per 24 hour   Intake 400 ml   Output 1500 ml   Net -1100 ml       Physical Exam  Vitals reviewed.   Constitutional:       General: He is not in acute distress.     Appearance: He is ill-appearing.      Comments: Chronically ill   HENT:      Head:      Comments: Right temporal deformity     Nose: Nose normal.      Mouth/Throat:      Mouth: Mucous membranes are moist.   Eyes:      Comments: enucleated right eye   Neck:      Comments: Tracheostomy in place  Cardiovascular:      Rate and Rhythm: Normal rate and regular rhythm.      Pulses: Normal pulses.      Heart sounds: Normal heart sounds.   Pulmonary:      Effort: No respiratory distress.      Breath sounds: No stridor. No wheezing, rhonchi or rales.   Abdominal:      Palpations: Abdomen is soft.      Tenderness: There is no guarding.      Comments: PEG   Musculoskeletal:         General: No swelling or deformity.      Cervical back: Neck supple.      Right lower leg: No edema.      Left lower leg:  No edema.      Comments: Thenar wasting.    Lymphadenopathy:      Cervical: No cervical adenopathy.   Skin:     General: Skin is warm and dry.   Neurological:      Mental Status: He is alert.      Cranial Nerves: No cranial nerve deficit.      Motor: Weakness present.       Vents:  Vent Mode: Standby (05/28/22 0415)  Ventilator Initiated: No (04/23/22 1934)  Set Rate: 0 BPM (05/18/22 0516)  Vt Set: 0 mL (05/18/22 0516)  Pressure Support: 15 cmH20 (05/22/22 0044)  PEEP/CPAP: 5 cmH20 (05/22/22 0044)  Oxygen Concentration (%): 28 (06/04/22 0045)  Peak Airway Pressure: 21 cmH2O (05/22/22 0044)  Plateau Pressure: 20 cmH20 (03/07/22 0500)  Total Ve: 7.8 mL (05/22/22 0044)  F/VT Ratio<105 (RSBI): (!) 22.64 (05/22/22 0044)    Lines/Drains/Airways       Central Venous Catheter Line  Duration                  Hemodialysis Catheter 12/30/21 0900 right internal jugular 156 days              Drain  Duration                  Colostomy 12/18/21 1030 Descending/sigmoid  days         Gastrostomy/Enterostomy 03/09/22 1436 Percutaneous endoscopic gastrostomy (PEG) midline feeding 86 days              Airway  Duration                  Surgical Airway 04/27/22 0856 Shiley;Other (Comment) Cuffed;Long 38 days              Peripheral Intravenous Line  Duration                  Peripheral IV - Single Lumen 06/03/22 1527 18 G Anterior;Right Forearm <1 day                    Significant Labs:    CBC/Anemia Profile:  Recent Labs   Lab 06/03/22  0557   WBC 27.17*   HGB 7.6*   HCT 22.6*   *   MCV 94.2   RDW 14.6*        Chemistries:  Recent Labs   Lab 06/03/22  0557   *   K 3.6   CL 96*   CO2 26   BUN 57*   CREATININE 1.65*   CALCIUM 8.8       All pertinent labs within the past 24 hours have been reviewed.    Significant Imaging:  I have reviewed all pertinent imaging results/findings within the past 24 hours.    Assessment/Plan:     * Denice gangrene  - Stable wound vac     Ischemic ulcer of right foot with necrosis of  bone  - Status post amputation    Osteomyelitis  - Continue IV antibiotics per ID recommendations - stop date 07/01   - He has been treated for multidrug resistant bacteria   - continue current treatment for now-- can repeat all his cultures if he spikes another temp - ID returns 06/06       Chronic respiratory failure  -Currently on nasal cannula  - has been off vent over a week. 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

## 2022-06-05 NOTE — RESPIRATORY THERAPY
Patient was put on trach collar durning the night. Patient rested comfortably through the night. Patient put back on cap with a 1lpm nasal cannula.

## 2022-06-05 NOTE — ASSESSMENT & PLAN NOTE
-Currently on nasal cannula  - has been off vent over a week. Trach downsized to #6 -- will try to cap as tolerated   - capped today, doing well. Hopeful for decannulation this week

## 2022-06-05 NOTE — PLAN OF CARE
Patient tolerating trach capped throughout day placed on trach collar for sleep/rest @night. Nadn. Pt has copious amts of secretions requires freq suctioning . Sats stable . BP improved tonight ablet ot give p.o cardizem as ordered. Levimir given qhs per order, no noted drops in blood suger this pm shift.wound vac functioning properly motor running continously  -125mmhg  seal intact no leakage or issues noted throughout night, pt turned and repositioned for comfort and prevention  as needed /per order.

## 2022-06-05 NOTE — PROGRESS NOTES
[facility]  Pulmonology  Progress Note    Patient Name: Og Garcia  MRN: [id number]  Admission Date: 12/23/2021  Hospital Length of Stay: 164 days  Code Status: Prior  Attending Provider: Cecil Abernathy DO  Primary Care Provider: Hector Arndt DNP, FNP-C   Principal Problem: Denice gangrene    Subjective:     Interval History: Stable today. Tracheostomy capped.     Objective:     Vital Signs (Most Recent):  Temp: 99.9 °F (37.7 °C) (06/05/22 0419)  Pulse: 105 (06/05/22 0419)  Resp: (!) 33 (06/05/22 0419)  BP: 108/61 (06/05/22 0419)  SpO2: 100 % (06/05/22 0419)   Vital Signs (24h Range):  Temp:  [98.9 °F (37.2 °C)-100.3 °F (37.9 °C)] 99.9 °F (37.7 °C)  Pulse:  [] 105  Resp:  [22-42] 33  SpO2:  [97 %-100 %] 100 %  BP: ()/(43-74) 108/61     Weight: 76 kg (167 lb 8.8 oz)  Body mass index is 23.37 kg/m².      Intake/Output Summary (Last 24 hours) at 6/5/2022 0711  Last data filed at 6/4/2022 1815  Gross per 24 hour   Intake 1590 ml   Output 200 ml   Net 1390 ml       Physical Exam  Vitals reviewed.   Constitutional:       General: He is not in acute distress.     Appearance: He is ill-appearing.      Comments: Chronically ill   HENT:      Head:      Comments: Right temporal deformity     Nose: Nose normal.      Mouth/Throat:      Mouth: Mucous membranes are moist.   Eyes:      Comments: enucleated right eye   Neck:      Comments: Tracheostomy in place, capped  Cardiovascular:      Rate and Rhythm: Regular rhythm. Tachycardia present.      Pulses: Normal pulses.      Heart sounds: Normal heart sounds.   Pulmonary:      Effort: No respiratory distress.      Breath sounds: No stridor. No wheezing, rhonchi or rales.   Abdominal:      Palpations: Abdomen is soft.      Tenderness: There is no guarding.      Comments: PEG   Musculoskeletal:         General: No swelling or deformity.      Cervical back: Neck supple.      Right lower leg: No edema.      Left lower leg: No edema.       Comments: Thenar wasting.    Lymphadenopathy:      Cervical: No cervical adenopathy.   Skin:     General: Skin is warm and dry.   Neurological:      Mental Status: He is alert.      Cranial Nerves: No cranial nerve deficit.      Motor: Weakness present.         Lines/Drains/Airways       Central Venous Catheter Line  Duration                  Hemodialysis Catheter 12/30/21 0900 right internal jugular 156 days              Drain  Duration                  Colostomy 12/18/21 1030 Descending/sigmoid  days         Gastrostomy/Enterostomy 03/09/22 1436 Percutaneous endoscopic gastrostomy (PEG) midline feeding 87 days              Airway  Duration                  Surgical Airway 04/27/22 0856 Shiley;Other (Comment) Cuffed;Long 38 days              Peripheral Intravenous Line  Duration                  Peripheral IV - Single Lumen 06/03/22 1527 18 G Anterior;Right Forearm 1 day                    Significant Labs:    CBC/Anemia Profile:  No results for input(s): WBC, HGB, HCT, PLT, MCV, RDW, IRON, FERRITIN, RETIC, FOLATE, [id number], OCCULTBLOOD in the last 48 hours.     Chemistries:  No results for input(s): NA, K, CL, CO2, BUN, CREATININE, CALCIUM, ALBUMIN, PROT, BILITOT, ALKPHOS, ALT, AST, GLUCOSE, MG, PHOS in the last 48 hours.    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 Trach downsized to #6 -- will try to cap as tolerated   - capped today, doing well. 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

## 2022-06-05 NOTE — PLAN OF CARE
Problem: Glycemic Control Impaired (Sepsis/Septic Shock)  Goal: Blood Glucose Level Within Desired Range  Outcome: Ongoing, Progressing  Intervention: Optimize Glycemic Control  Flowsheets (Taken 6/5/2022 9722)  Glycemic Management:   blood glucose monitored   supplemental insulin given

## 2022-06-05 NOTE — SUBJECTIVE & OBJECTIVE
Interval History: Stable today. Tracheostomy capped.     Objective:     Vital Signs (Most Recent):  Temp: 99.9 °F (37.7 °C) (06/05/22 0419)  Pulse: 105 (06/05/22 0419)  Resp: (!) 33 (06/05/22 0419)  BP: 108/61 (06/05/22 0419)  SpO2: 100 % (06/05/22 0419)   Vital Signs (24h Range):  Temp:  [98.9 °F (37.2 °C)-100.3 °F (37.9 °C)] 99.9 °F (37.7 °C)  Pulse:  [] 105  Resp:  [22-42] 33  SpO2:  [97 %-100 %] 100 %  BP: ()/(43-74) 108/61     Weight: 76 kg (167 lb 8.8 oz)  Body mass index is 23.37 kg/m².      Intake/Output Summary (Last 24 hours) at 6/5/2022 0711  Last data filed at 6/4/2022 1815  Gross per 24 hour   Intake 1590 ml   Output 200 ml   Net 1390 ml       Physical Exam  Vitals reviewed.   Constitutional:       General: He is not in acute distress.     Appearance: He is ill-appearing.      Comments: Chronically ill   HENT:      Head:      Comments: Right temporal deformity     Nose: Nose normal.      Mouth/Throat:      Mouth: Mucous membranes are moist.   Eyes:      Comments: enucleated right eye   Neck:      Comments: Tracheostomy in place, capped  Cardiovascular:      Rate and Rhythm: Regular rhythm. Tachycardia present.      Pulses: Normal pulses.      Heart sounds: Normal heart sounds.   Pulmonary:      Effort: No respiratory distress.      Breath sounds: No stridor. No wheezing, rhonchi or rales.   Abdominal:      Palpations: Abdomen is soft.      Tenderness: There is no guarding.      Comments: PEG   Musculoskeletal:         General: No swelling or deformity.      Cervical back: Neck supple.      Right lower leg: No edema.      Left lower leg: No edema.      Comments: Thenar wasting.    Lymphadenopathy:      Cervical: No cervical adenopathy.   Skin:     General: Skin is warm and dry.   Neurological:      Mental Status: He is alert.      Cranial Nerves: No cranial nerve deficit.      Motor: Weakness present.         Lines/Drains/Airways       Central Venous Catheter Line  Duration                   Hemodialysis Catheter 12/30/21 0900 right internal jugular 156 days              Drain  Duration                  Colostomy 12/18/21 1030 Descending/sigmoid  days         Gastrostomy/Enterostomy 03/09/22 1436 Percutaneous endoscopic gastrostomy (PEG) midline feeding 87 days              Airway  Duration                  Surgical Airway 04/27/22 0856 Shiley;Other (Comment) Cuffed;Long 38 days              Peripheral Intravenous Line  Duration                  Peripheral IV - Single Lumen 06/03/22 1527 18 G Anterior;Right Forearm 1 day                    Significant Labs:    CBC/Anemia Profile:  No results for input(s): WBC, HGB, HCT, PLT, MCV, RDW, IRON, FERRITIN, RETIC, FOLATE, [id number], OCCULTBLOOD in the last 48 hours.     Chemistries:  No results for input(s): NA, K, CL, CO2, BUN, CREATININE, CALCIUM, ALBUMIN, PROT, BILITOT, ALKPHOS, ALT, AST, GLUCOSE, MG, PHOS in the last 48 hours.    None    Significant Imaging:  None

## 2022-06-06 NOTE — DIALYSIS ROUNDING
Pt seen during his hemodialysis. BP 90 systolic. He looks toward me and answers my greeting. Overall stable.

## 2022-06-06 NOTE — ASSESSMENT & PLAN NOTE
- Continue IV antibiotics per ID recommendations - stop date 07/01   - He has been treated for multidrug resistant bacteria

## 2022-06-06 NOTE — PROGRESS NOTES
Progress Note                                                           Infectious disease   Admit Date: 12/23/2021    SUBJECTIVE:     Follow-up For:  Denice gangrene    HPI/Interval history:  Patient with intermittent fever every few days, last fever was 101.8 and that was 3 days ago.   Per respiratory he has a lot of endotracheal secretions.  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    Review of Systems:    unable to obtain as patient nonverbal      OBJECTIVE:     Vital Signs Range (Last 24H):  Temp:  [99.3 °F (37.4 °C)-100.3 °F (37.9 °C)]   Pulse:  []   Resp:  [26-40]   BP: ()/(42-60)   SpO2:  [95 %-100 %]     Physical Exam:  Constitutional:  No acute distress, on trach collar, makes eye contact but does not follow commands  HENT: supple   Head: normocephalic, atraumatic   Eyes: Conjunctivae and pupil normal of the left, enucleated right eye ball, no drainage   Cardiovascular: regular rate and rhythm, S1, S2 normal, no murmur, click, rub or gallop  Pulmonary:  On trach collar, no crepitations or wheezing appreciated  Gastrointestinal:  Colostomy and PEG present, bowel sounds normal; non-tender, no masses or organomegaly appreciated  Muscular/Skeletal: Lower extremities without cyanosis or edema, no clubbing.  Multiple wounds to feet bandaged  Skin: Skin color, texture normal. No rashes, ulcers or new lesions. Skin warm and dry.     Laboratory:  CBC:   Recent Labs   Lab 06/06/22  0235   WBC 17.04*   RBC 2.33*   HGB 7.4*   HCT 22.5*   *   MCV 96.6*   MCH 31.8*   MCHC 32.9     BMP:   Recent Labs   Lab 06/06/22  0235   GLU 89      K 3.2*      CO2 27   BUN 59*   CREATININE 1.66*   CALCIUM 8.8     CMP:   Recent Labs   Lab 06/06/22  0235   GLU 89   CALCIUM 8.8      K 3.2*   CO2 27      BUN 59*   CREATININE 1.66*     Microbiology Results (last 7 days)     ** No results found for the last 168 hours. 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 Osteomyelitis to right 5th digit which was amputated 5/20  2. Osteomyelitis to the distal right fibula  3. Episodic fevers with leukocytosis, likely due to chronically ill state with multiple wounds  4. Multiple decubitus ulcers including large wound to sacrum infected recently with multidrug resistant organisms  5. Prolonged hospital stay  6. End-stage renal disease on  hemodialysis    PLAN:  1. Continue tobramycin and meropenem, both renally dosed, until 7/1  2. 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 Monitor temperature curve and white blood cell count

## 2022-06-06 NOTE — PROGRESS NOTES
[facility]  Pulmonology  Progress Note    Patient Name: Og Garcia  MRN: [id number]  Admission Date: 12/23/2021  Hospital Length of Stay: 165 days  Code Status: Prior  Attending Provider: Cecil Abernathy DO  Primary Care Provider: Hector Arndt DNP, FNP-C   Principal Problem: Denice gangrene    Subjective:     Interval History: No acute events overnight. He is currently resting comfortably. Oxygenating adequately on trach collar. He is afebrile this am and vital signs are stable.    Objective:     Vital Signs (Most Recent):  Temp: 99.3 °F (37.4 °C) (06/06/22 1100)  Pulse: 91 (06/06/22 1102)  Resp: (!) 38 (06/06/22 1102)  BP: (!) 99/55 (06/06/22 1100)  SpO2: 100 % (06/06/22 1102)   Vital Signs (24h Range):  Temp:  [99.3 °F (37.4 °C)-100.3 °F (37.9 °C)] 99.3 °F (37.4 °C)  Pulse:  [] 91  Resp:  [26-40] 38  SpO2:  [95 %-100 %] 100 %  BP: ()/(42-64) 99/55     Weight: 78 kg (171 lb 15.3 oz)  Body mass index is 23.98 kg/m².      Intake/Output Summary (Last 24 hours) at 6/6/2022 1213  Last data filed at 6/6/2022 1100  Gross per 24 hour   Intake 1110 ml   Output 780 ml   Net 330 ml         Physical Exam    Vents:  Vent Mode: Standby (05/28/22 0415)  Ventilator Initiated: No (04/23/22 1934)  Set Rate: 0 BPM (05/18/22 0516)  Vt Set: 0 mL (05/18/22 0516)  Pressure Support: 15 cmH20 (05/22/22 0044)  PEEP/CPAP: 5 cmH20 (05/22/22 0044)  Oxygen Concentration (%): 28 (06/06/22 0800)  Peak Airway Pressure: 21 cmH2O (05/22/22 0044)  Plateau Pressure: 20 cmH20 (03/07/22 0500)  Total Ve: 7.8 mL (05/22/22 0044)  F/VT Ratio<105 (RSBI): (!) 22.64 (05/22/22 0044)    Lines/Drains/Airways       Central Venous Catheter Line  Duration                  Hemodialysis Catheter 12/30/21 0900 right internal jugular 158 days              Drain  Duration                  Colostomy 12/18/21 1030 Descending/sigmoid  days         Gastrostomy/Enterostomy 03/09/22 1436 Percutaneous endoscopic  gastrostomy (PEG) midline feeding 88 days              Airway  Duration                  Surgical Airway 04/27/22 0856 Shiley;Other (Comment) Cuffed;Long 40 days              Peripheral Intravenous Line  Duration                  Peripheral IV - Single Lumen 06/03/22 1527 18 G Anterior;Right Forearm 2 days                    Significant Labs:    CBC/Anemia Profile:  Recent Labs   Lab 06/06/22  0235   WBC 17.04*   HGB 7.4*   HCT 22.5*   *   MCV 96.6*   RDW 15.1*          Chemistries:  Recent Labs   Lab 06/06/22  0235      K 3.2*      CO2 27   BUN 59*   CREATININE 1.66*   CALCIUM 8.8         All pertinent labs within the past 24 hours have been reviewed.    Significant Imaging:  I have reviewed all pertinent imaging results/findings within the past 24 hours.    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 Trach downsized to #6 -- will try to cap as tolerated   - capped today, doing well. 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

## 2022-06-06 NOTE — ASSESSMENT & PLAN NOTE
-Currently on nasal cannula  - has been off vent over a week. Trach downsized to #6 -- will try to cap as tolerated   - capped today, doing well. Hopeful for decannulation later this week

## 2022-06-06 NOTE — SUBJECTIVE & OBJECTIVE
Interval History: No acute events overnight. He is currently resting comfortably. Oxygenating adequately on trach collar. He is afebrile this am and vital signs are stable.    Objective:     Vital Signs (Most Recent):  Temp: 99.3 °F (37.4 °C) (06/06/22 1100)  Pulse: 91 (06/06/22 1102)  Resp: (!) 38 (06/06/22 1102)  BP: (!) 99/55 (06/06/22 1100)  SpO2: 100 % (06/06/22 1102)   Vital Signs (24h Range):  Temp:  [99.3 °F (37.4 °C)-100.3 °F (37.9 °C)] 99.3 °F (37.4 °C)  Pulse:  [] 91  Resp:  [26-40] 38  SpO2:  [95 %-100 %] 100 %  BP: ()/(42-64) 99/55     Weight: 78 kg (171 lb 15.3 oz)  Body mass index is 23.98 kg/m².      Intake/Output Summary (Last 24 hours) at 6/6/2022 1213  Last data filed at 6/6/2022 1100  Gross per 24 hour   Intake 1110 ml   Output 780 ml   Net 330 ml         Physical Exam    Vents:  Vent Mode: Standby (05/28/22 0415)  Ventilator Initiated: No (04/23/22 1934)  Set Rate: 0 BPM (05/18/22 0516)  Vt Set: 0 mL (05/18/22 0516)  Pressure Support: 15 cmH20 (05/22/22 0044)  PEEP/CPAP: 5 cmH20 (05/22/22 0044)  Oxygen Concentration (%): 28 (06/06/22 0800)  Peak Airway Pressure: 21 cmH2O (05/22/22 0044)  Plateau Pressure: 20 cmH20 (03/07/22 0500)  Total Ve: 7.8 mL (05/22/22 0044)  F/VT Ratio<105 (RSBI): (!) 22.64 (05/22/22 0044)    Lines/Drains/Airways       Central Venous Catheter Line  Duration                  Hemodialysis Catheter 12/30/21 0900 right internal jugular 158 days              Drain  Duration                  Colostomy 12/18/21 1030 Descending/sigmoid  days         Gastrostomy/Enterostomy 03/09/22 1436 Percutaneous endoscopic gastrostomy (PEG) midline feeding 88 days              Airway  Duration                  Surgical Airway 04/27/22 0856 Marychuy;Other (Comment) Cuffed;Long 40 days              Peripheral Intravenous Line  Duration                  Peripheral IV - Single Lumen 06/03/22 1527 18 G Anterior;Right Forearm 2 days                    Significant Labs:    CBC/Anemia  Profile:  Recent Labs   Lab 06/06/22  0235   WBC 17.04*   HGB 7.4*   HCT 22.5*   *   MCV 96.6*   RDW 15.1*          Chemistries:  Recent Labs   Lab 06/06/22  0235      K 3.2*      CO2 27   BUN 59*   CREATININE 1.66*   CALCIUM 8.8         All pertinent labs within the past 24 hours have been reviewed.    Significant Imaging:  I have reviewed all pertinent imaging results/findings within the past 24 hours.

## 2022-06-07 NOTE — PROGRESS NOTES
[facility]  Pulmonology  Progress Note    Patient Name: Og Garcia  MRN: [id number]  Admission Date: 12/23/2021  Hospital Length of Stay: 166 days  Code Status: Prior  Attending Provider: Cecil Abernathy DO  Primary Care Provider: Hector Arndt DNP, FNP-C   Principal Problem: Denice gangrene    Subjective:     Interval History: No acute events overnight. He is currently resting comfortably. Oxygenating adequately on trach collar. He is afebrile this am and vital signs are stable.    Objective:     Vital Signs (Most Recent):  Temp: 98.4 °F (36.9 °C) (06/07/22 1100)  Pulse: 104 (06/07/22 1100)  Resp: (!) 37 (06/07/22 1100)  BP: (!) 126/54 (06/07/22 1214)  SpO2: 100 % (06/07/22 1100)   Vital Signs (24h Range):  Temp:  [97.9 °F (36.6 °C)-99 °F (37.2 °C)] 98.4 °F (36.9 °C)  Pulse:  [] 104  Resp:  [26-40] 37  SpO2:  [93 %-100 %] 100 %  BP: ()/(16-61) 126/54     Weight: 78.6 kg (173 lb 4.5 oz)  Body mass index is 24.17 kg/m².      Intake/Output Summary (Last 24 hours) at 6/7/2022 1308  Last data filed at 6/7/2022 0800  Gross per 24 hour   Intake 170 ml   Output 150 ml   Net 20 ml         Physical Exam    Vents:  Vent Mode: Standby (05/28/22 0415)  Ventilator Initiated: No (04/23/22 1934)  Set Rate: 0 BPM (05/18/22 0516)  Vt Set: 0 mL (05/18/22 0516)  Pressure Support: 15 cmH20 (05/22/22 0044)  PEEP/CPAP: 5 cmH20 (05/22/22 0044)  Oxygen Concentration (%): 28 (06/07/22 0745)  Peak Airway Pressure: 21 cmH2O (05/22/22 0044)  Plateau Pressure: 20 cmH20 (03/07/22 0500)  Total Ve: 7.8 mL (05/22/22 0044)  F/VT Ratio<105 (RSBI): (!) 22.64 (05/22/22 0044)    Lines/Drains/Airways       Central Venous Catheter Line  Duration                  Hemodialysis Catheter 12/30/21 0900 right internal jugular 159 days              Drain  Duration                  Colostomy 12/18/21 1030 Descending/sigmoid  days         Gastrostomy/Enterostomy 03/09/22 1436 Percutaneous endoscopic  gastrostomy (PEG) midline feeding 89 days              Airway  Duration                  Surgical Airway 04/27/22 0856 Shivitaliy;Other (Comment) Cuffed;Long 41 days              Peripheral Intravenous Line  Duration                  Peripheral IV - Single Lumen 06/03/22 1527 18 G Anterior;Right Forearm 3 days                    Significant Labs:    CBC/Anemia Profile:  Recent Labs   Lab 06/06/22  0235   WBC 17.04*   HGB 7.4*   HCT 22.5*   *   MCV 96.6*   RDW 15.1*          Chemistries:  Recent Labs   Lab 06/06/22  0235      K 3.2*      CO2 27   BUN 59*   CREATININE 1.66*   CALCIUM 8.8         All pertinent labs within the past 24 hours have been reviewed.    Significant Imaging:  I have reviewed all pertinent imaging results/findings within the past 24 hours.    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 Continue tobramycin and meropenem, both renally dosed, until 7/1  2. 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 Monitor temperature curve and white blood cell count    Chronic respiratory failure    - has been off vent over a week. Trach downsized to #6 -- will try to cap as tolerated   - capping at times, doing well. 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

## 2022-06-07 NOTE — PROGRESS NOTES
Progress Note                                                           Infectious disease   Admit Date: 12/23/2021    SUBJECTIVE:     Follow-up For:  Denice gangrene    HPI/Interval history:  T-max 99.3°.  No acute issues today per his nurse.  He's been doing well with capped tracheostomy.    Review of Systems:    unable to obtain as patient nonverbal      OBJECTIVE:     Vital Signs Range (Last 24H):  Temp:  [97.9 °F (36.6 °C)-99 °F (37.2 °C)]   Pulse:  []   Resp:  [26-38]   BP: ()/(16-61)   SpO2:  [93 %-100 %]     Physical Exam:  Constitutional:  No acute distress, on trach collar, makes eye contact but does not follow commands  HENT: supple   Head: normocephalic, atraumatic   Eyes: Conjunctivae and pupil normal of the left, enucleated right eye ball, no drainage   Cardiovascular: regular rate and rhythm, S1, S2 normal, no murmur, click, rub or gallop  Pulmonary:  Capped trach, no crepitations or wheezing appreciated  Gastrointestinal:  Colostomy and PEG present, bowel sounds normal; non-tender, no masses or organomegaly appreciated  Muscular/Skeletal: Lower extremities without cyanosis or edema, no clubbing.  Multiple wounds to feet bandaged  Skin: Skin color, texture normal. No rashes, ulcers or new lesions. Skin warm and dry.     Laboratory:  CBC:   Recent Labs   Lab 06/06/22  0235   WBC 17.04*   RBC 2.33*   HGB 7.4*   HCT 22.5*   *   MCV 96.6*   MCH 31.8*   MCHC 32.9     BMP:   Recent Labs   Lab 06/06/22  0235   GLU 89      K 3.2*      CO2 27   BUN 59*   CREATININE 1.66*   CALCIUM 8.8     CMP:   Recent Labs   Lab 06/06/22  0235   GLU 89   CALCIUM 8.8      K 3.2*   CO2 27      BUN 59*   CREATININE 1.66*     Microbiology Results (last 7 days)     ** No results found for the last 168 hours. 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 Osteomyelitis to right 5th digit which was amputated 5/20  2. Osteomyelitis to the distal right fibula  3. Episodic fevers with leukocytosis, likely due to chronically ill state with multiple wounds  4. Multiple decubitus ulcers including large wound to sacrum infected recently with multidrug resistant organisms  5. Prolonged hospital stay  6. End-stage renal disease on hemodialysis    PLAN:  1. Continue current antibiotics - linezolid, tobramycin and meropenem.  Looking at the photographs of his wounds and given the  intermittent fever, he probably needs all the antibiotics until 7/1 as there is most likely residual infection in the right foot following amputation of the 5th toe.  2. Monitor the CBC on linezolid  3. Monitor temperature curve and white blood cell count

## 2022-06-07 NOTE — SUBJECTIVE & OBJECTIVE
Interval History: No acute events overnight. He is currently resting comfortably. Oxygenating adequately on trach collar. He is afebrile this am and vital signs are stable.    Objective:     Vital Signs (Most Recent):  Temp: 98.4 °F (36.9 °C) (06/07/22 1100)  Pulse: 104 (06/07/22 1100)  Resp: (!) 37 (06/07/22 1100)  BP: (!) 126/54 (06/07/22 1214)  SpO2: 100 % (06/07/22 1100)   Vital Signs (24h Range):  Temp:  [97.9 °F (36.6 °C)-99 °F (37.2 °C)] 98.4 °F (36.9 °C)  Pulse:  [] 104  Resp:  [26-40] 37  SpO2:  [93 %-100 %] 100 %  BP: ()/(16-61) 126/54     Weight: 78.6 kg (173 lb 4.5 oz)  Body mass index is 24.17 kg/m².      Intake/Output Summary (Last 24 hours) at 6/7/2022 1308  Last data filed at 6/7/2022 0800  Gross per 24 hour   Intake 170 ml   Output 150 ml   Net 20 ml         Physical Exam    Vents:  Vent Mode: Standby (05/28/22 0415)  Ventilator Initiated: No (04/23/22 1934)  Set Rate: 0 BPM (05/18/22 0516)  Vt Set: 0 mL (05/18/22 0516)  Pressure Support: 15 cmH20 (05/22/22 0044)  PEEP/CPAP: 5 cmH20 (05/22/22 0044)  Oxygen Concentration (%): 28 (06/07/22 0745)  Peak Airway Pressure: 21 cmH2O (05/22/22 0044)  Plateau Pressure: 20 cmH20 (03/07/22 0500)  Total Ve: 7.8 mL (05/22/22 0044)  F/VT Ratio<105 (RSBI): (!) 22.64 (05/22/22 0044)    Lines/Drains/Airways       Central Venous Catheter Line  Duration                  Hemodialysis Catheter 12/30/21 0900 right internal jugular 159 days              Drain  Duration                  Colostomy 12/18/21 1030 Descending/sigmoid  days         Gastrostomy/Enterostomy 03/09/22 1436 Percutaneous endoscopic gastrostomy (PEG) midline feeding 89 days              Airway  Duration                  Surgical Airway 04/27/22 0856 [place];Other (Comment) Cuffed;Long 41 days              Peripheral Intravenous Line  Duration                  Peripheral IV - Single Lumen 06/03/22 1527 18 G Anterior;Right Forearm 3 days                    Significant Labs:    CBC/Anemia  Profile:  Recent Labs   Lab 06/06/22  0235   WBC 17.04*   HGB 7.4*   HCT 22.5*   *   MCV 96.6*   RDW 15.1*          Chemistries:  Recent Labs   Lab 06/06/22  0235      K 3.2*      CO2 27   BUN 59*   CREATININE 1.66*   CALCIUM 8.8         All pertinent labs within the past 24 hours have been reviewed.    Significant Imaging:  I have reviewed all pertinent imaging results/findings within the past 24 hours.

## 2022-06-07 NOTE — PROGRESS NOTES
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 male with wounds to sacral, left lateral lower leg, right anterior foot, right hand, left posterior heel, and right lateral and medial foot.  Wounds continue to show signs of necrosis despite enzymatic debridement, bedside debridements, surgical interventions, adherence to turn protocols, off-loading, and low air loss mattress. Bone culture and pathology confirmative for osteomyelitis. He had amputation of right 5th toe on 5/20/2022. Skin around amputation site has necrotic tissue periwound. 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 He is currently tolerating trach collar. Patient has been hospitalized since December, he is non-ambulatory, ESRD, been on levophed a majority of hospitalization, and has poor vascular supply. Arterial dopplers in May showed SUSAN 0.65 and 0.7, will consult vascular surgery for evaluation. Patient also needs further debridement in OR.  Pertinent medical history includes diabetes, hypertension, anemia, chronic respiratory failure, and infection. Factors that complicate wound healing include ESRD, chronic anemia,chronic respiratory failure, multiple co-morbidities, poor vascular supply, diabetes, decreased granulation tissue, necrosis and infection.           Review of Systems   Unable to perform ROS: Acuity of condition     Objective:     Vital Signs (Most Recent):  Temp: 98.6 °F (37 °C) (06/07/22 0800)  Pulse: 104 (06/07/22 1100)  Resp: (!) 37 (06/07/22  1100)  BP: (!) 109/16 (06/07/22 1100)  SpO2: 100 % (06/07/22 1100) Vital Signs (24h Range):  Temp:  [97.9 °F (36.6 °C)-99.2 °F (37.3 °C)] 98.6 °F (37 °C)  Pulse:  [] 104  Resp:  [26-40] 37  SpO2:  [93 %-100 %] 100 %  BP: ()/(16-68) 109/16     Weight: 78.6 kg (173 lb 4.5 oz)  Body mass index is 24.17 kg/m².  No data recorded    Physical Exam  Vitals reviewed.   Constitutional:       Appearance: He is ill-appearing.      Comments: Chronically ill   HENT:      Head: Normocephalic.   Cardiovascular:      Rate and Rhythm: Regular rhythm. Tachycardia present.   Pulmonary:      Effort: Pulmonary effort is normal.   Skin:     Findings: Erythema present.      Comments: Multiple wounds   Neurological:      Mental Status: Mental status is at baseline.      Sensory: Sensory deficit present.      Motor: Weakness present.             Negative Pressure Wound Therapy  05/26/22 1300 medial (Active)   05/26/22 1300   Side:    Orientation: medial   Location: Coccyx   Additional Comments:    Removal Indication and Assessment:    Location:    SDO Location:    NPWT Type Vacuum Therapy 06/08/22 1707   Therapy Setting NPWT Continuous therapy 06/08/22 1707   Pressure Setting NPWT 125 mmHg 06/08/22 1707   Therapy Interventions NPWT Seal intact 06/07/22 1910   Sponges Inserted NPWT Black;White 06/02/22 1200   Sponges Removed NPWT Black;White 06/02/22 1200   General Output (mL) 250 06/02/22 1200   Number of days: 13            Altered Skin Integrity 01/20/22 1000 Right anterior Foot Purple or maroon localized area of discolored intact skin or non-intact skin or a blood-filled blister. (Active)   01/20/22 1000   Altered Skin Integrity Present on Admission: suspected hospital acquired   Side: Right   Orientation: anterior   Location: Foot   Wound Number:    Is this injury device related?:    Primary Wound Type:    Description of Altered Skin Integrity: Purple or maroon localized area of discolored intact skin or non-intact skin or a  blood-filled blister.   Removal Indication and Assessment:    Wound Outcome:    (Retired) Wound Length (cm):    (Retired) Wound Width (cm):    (Retired) Depth (cm):    Wound Description (Comments):    Removal Indications:    Wound Image    05/31/22 1300   Description of Altered Skin Integrity Full thickness tissue loss. Base is covered by slough and/or eschar in the wound bed 06/01/22 1300   Dressing Appearance Dry 06/07/22 0745   Drainage Amount Scant 06/01/22 1300   Drainage Characteristics/Odor Serous;Tan;No odor 06/01/22 1300   Appearance Dressing in place, unable to visualize 06/07/22 1910   Tissue loss description Not applicable 06/01/22 1300   Black (%), Wound Tissue Color 0 % 05/31/22 1300   Red (%), Wound Tissue Color 5 % 05/31/22 1300   Yellow (%), Wound Tissue Color 95 % 05/31/22 1300   Periwound Area Moist 06/01/22 1300   Wound Edges Defined;Open 06/01/22 1300   Wound Length (cm) 2.5 cm 05/31/22 1300   Wound Width (cm) 2.5 cm 05/31/22 1300   Wound Depth (cm) 0.5 cm 05/31/22 1300   Wound Volume (cm^3) 3.125 cm^3 05/31/22 1300   Wound Surface Area (cm^2) 6.25 cm^2 05/31/22 1300   Care Cleansed with:;Soap and water 06/03/22 1200   Dressing Changed 06/05/22 1530   Periwound Care Moisture barrier applied 06/01/22 1300   Off Loading Other (see comments) 06/07/22 1235   Dressing Change Due 06/02/22 06/01/22 1300   Number of days: 139            Altered Skin Integrity 01/20/22 1000 Right lateral Foot Purple or maroon localized area of discolored intact skin or non-intact skin or a blood-filled blister. (Active)   01/20/22 1000   Altered Skin Integrity Present on Admission: suspected hospital acquired   Side: Right   Orientation: lateral   Location: Foot   Wound Number:    Is this injury device related?:    Primary Wound Type:    Description of Altered Skin Integrity: Purple or maroon localized area of discolored intact skin or non-intact skin or a blood-filled blister.   Removal Indication and Assessment:     Wound Outcome:    (Retired) Wound Length (cm):    (Retired) Wound Width (cm):    (Retired) Depth (cm):    Wound Description (Comments):    Removal Indications:    Wound Image    05/24/22 1330   Description of Altered Skin Integrity Full thickness tissue loss with exposed bone, tendon, or muscle. Often includes undermining and tunneling. May extend into muscle and/or supporting structures. 05/24/22 1330   Dressing Appearance Dry 06/07/22 0745   Drainage Amount Scant 05/29/22 0800   Drainage Characteristics/Odor [place];No odor 05/29/22 0800   Appearance Dressing in place, unable to visualize 06/07/22 1910   Tissue loss description Not applicable 05/24/22 1330   Black (%), Wound Tissue Color 0 % 05/24/22 1330   Red (%), Wound Tissue Color 10 % 05/24/22 1330   Yellow (%), Wound Tissue Color 90 % 05/24/22 1330   Periwound Area Moist 05/29/22 0800   Wound Edges Defined;Open 05/24/22 1330   Wound Length (cm) 3 cm 05/24/22 1330   Wound Width (cm) 4.5 cm 05/24/22 1330   Wound Depth (cm) 0.5 cm 05/24/22 1330   Wound Volume (cm^3) 6.75 cm^3 05/24/22 1330   Wound Surface Area (cm^2) 13.5 cm^2 05/24/22 1330   Care Cleansed with:;Soap and water 06/03/22 1200   Dressing Changed 06/05/22 1530   Periwound Care Moisture barrier applied 05/29/22 0800   Off Loading Other (see comments) 06/07/22 1235   Dressing Change Due 05/25/22 05/24/22 1330   Number of days: 139            Altered Skin Integrity 02/08/22 1300 Left posterior Heel Other (comment) Full thickness tissue loss. Base is covered by slough and/or eschar in the wound bed (Active)   02/08/22 1300   Altered Skin Integrity Present on Admission: yes   Side: Left   Orientation: posterior   Location: Heel   Wound Number:    Is this injury device related?: No   Primary Wound Type: Other   Description of Altered Skin Integrity: Full thickness tissue loss. Base is covered by slough and/or eschar in the wound bed   Removal Indication and Assessment:    Wound Outcome:    (Retired) Wound  Length (cm):    (Retired) Wound Width (cm):    (Retired) Depth (cm):    Wound Description (Comments):    Removal Indications:    Wound Image    05/31/22 1300   Description of Altered Skin Integrity Full thickness tissue loss with exposed bone, tendon, or muscle. Often includes undermining and tunneling. May extend into muscle and/or supporting structures. 06/01/22 1300   Dressing Appearance Dry 06/07/22 0745   Drainage Amount Small 06/01/22 1300   Drainage Characteristics/Odor Serous;Tan;No odor 06/01/22 1300   Appearance Dressing in place, unable to visualize 06/07/22 1910   Tissue loss description Not applicable 06/01/22 1300   Black (%), Wound Tissue Color 0 % 05/31/22 1300   Red (%), Wound Tissue Color 20 % 05/31/22 1300   Yellow (%), Wound Tissue Color 80 % 05/31/22 1300   Periwound Area Moist 06/01/22 1300   Wound Edges Defined;Open 06/01/22 1300   Wound Length (cm) 7.5 cm 05/31/22 1300   Wound Width (cm) 6.5 cm 05/31/22 1300   Wound Depth (cm) 0.5 cm 05/24/22 1330   Wound Volume (cm^3) 21.7 cm^3 05/24/22 1330   Wound Surface Area (cm^2) 48.75 cm^2 05/31/22 1300   Care Cleansed with:;Soap and water 06/03/22 1200   Dressing Changed 06/05/22 1530   Periwound Care Moisture barrier applied;Moisturizer applied 06/01/22 1300   Off Loading Other (see comments) 06/07/22 1235   Dressing Change Due 06/02/22 06/01/22 1300   Number of days: 120            Altered Skin Integrity 02/15/22 1300 Left lateral Leg Traumatic Partial thickness tissue loss. Shallow open ulcer with a red or pink wound bed, without slough. Intact or Open/Ruptured Serum-filled blister. (Active)   02/15/22 1300   Altered Skin Integrity Present on Admission: suspected hospital acquired   Side: Left   Orientation: lateral   Location: Leg   Wound Number:    Is this injury device related?: No   Primary Wound Type: Traumatic   Description of Altered Skin Integrity: Partial thickness tissue loss. Shallow open ulcer with a red or pink wound bed, without  slough. Intact or Open/Ruptured Serum-filled blister.   Removal Indication and Assessment:    Wound Outcome:    (Retired) Wound Length (cm):    (Retired) Wound Width (cm):    (Retired) Depth (cm):    Wound Description (Comments):    Removal Indications:    Wound Image    05/31/22 1300   Description of Altered Skin Integrity Full thickness tissue loss. Base is covered by slough and/or eschar in the wound bed 04/26/22 1235   Dressing Appearance Open to air 06/07/22 1910   Drainage Amount None 06/01/22 1300   Appearance Dry 06/01/22 1300   Tissue loss description Not applicable 06/01/22 1300   Black (%), Wound Tissue Color 10 % 04/26/22 1235   Red (%), Wound Tissue Color 100 % 05/31/22 1300   Yellow (%), Wound Tissue Color 0 % 04/26/22 1235   Periwound Area Dry 06/01/22 1300   Wound Edges Defined 06/01/22 1300   Care Cleansed with:;Soap and water 06/03/22 1200   Dressing Applied;Collagen 06/03/22 1200   Off Loading Other (see comments) 06/07/22 1235   Dressing Change Due 05/09/22 05/08/22 1200   Number of days: 113            Altered Skin Integrity 02/28/22 1300 Left anterior Foot Traumatic Partial thickness tissue loss. Shallow open ulcer with a red or pink wound bed, without slough. Intact or Open/Ruptured Serum-filled blister. (Active)   02/28/22 1300   Altered Skin Integrity Present on Admission: suspected hospital acquired   Side: Left   Orientation: anterior   Location: Foot   Wound Number:    Is this injury device related?: No   Primary Wound Type: Traumatic   Description of Altered Skin Integrity: Partial thickness tissue loss. Shallow open ulcer with a red or pink wound bed, without slough. Intact or Open/Ruptured Serum-filled blister.   Removal Indication and Assessment:    Wound Outcome:    (Retired) Wound Length (cm):    (Retired) Wound Width (cm):    (Retired) Depth (cm):    Wound Description (Comments):    Removal Indications:    Wound Image    05/31/22 1300   Description of Altered Skin Integrity Partial  thickness tissue loss. Shallow open ulcer with a red or pink wound bed, without slough. Intact or Open/Ruptured Serum-filled blister. 05/12/22 1200   Dressing Appearance Open to air 06/07/22 1910   Drainage Amount None 06/01/22 1300   Drainage Characteristics/Odor Serous 05/10/22 0742   Appearance Dry 06/01/22 1300   Tissue loss description Partial thickness 05/04/22 1300   Black (%), Wound Tissue Color 0 % 05/17/22 1300   Red (%), Wound Tissue Color 0 % 05/17/22 1300   Yellow (%), Wound Tissue Color 0 % 05/17/22 1300   Periwound Area Dry 06/01/22 1300   Wound Edges Defined 06/01/22 1300   Care Cleansed with:;Soap and water 05/31/22 1300   Dressing Changed 05/22/22 1400   Off Loading Other (see comments) 06/07/22 1235   Dressing Change Due 05/09/22 05/08/22 1200   Number of days: 100            Altered Skin Integrity 03/15/22 1000 Right lateral Malleolus Ulceration Purple or maroon localized area of discolored intact skin or non-intact skin or a blood-filled blister. (Active)   03/15/22 1000   Altered Skin Integrity Present on Admission: suspected hospital acquired   Side: Right   Orientation: lateral   Location: Malleolus   Wound Number:    Is this injury device related?: Yes   Primary Wound Type: Ulceration   Description of Altered Skin Integrity: Purple or maroon localized area of discolored intact skin or non-intact skin or a blood-filled blister.   Removal Indication and Assessment:    Wound Outcome:    (Retired) Wound Length (cm):    (Retired) Wound Width (cm):    (Retired) Depth (cm):    Wound Description (Comments):    Removal Indications:    Wound Image    05/31/22 1300   Description of Altered Skin Integrity Full thickness tissue loss. Subcutaneous fat may be visible but bone, tendon or muscle are not exposed 06/01/22 1300   Dressing Appearance Dry 06/07/22 0745   Drainage Amount Small 06/01/22 1300   Drainage Characteristics/Odor Serous;Tan;No odor 06/01/22 1300   Appearance Dressing in place, unable to  visualize 06/07/22 1910   Tissue loss description Full thickness 06/01/22 1300   Black (%), Wound Tissue Color 0 % 05/31/22 1300   Red (%), Wound Tissue Color 99 % 05/31/22 1300   Yellow (%), Wound Tissue Color 1 % 05/31/22 1300   Periwound Area Moist;[place] 06/01/22 1300   Wound Edges Defined;Open 06/01/22 1300   Wound Length (cm) 3 cm 05/31/22 1300   Wound Width (cm) 3.5 cm 05/31/22 1300   Wound Depth (cm) 0.2 cm 05/31/22 1300   Wound Volume (cm^3) 2.1 cm^3 05/31/22 1300   Wound Surface Area (cm^2) 10.5 cm^2 05/31/22 1300   Care Cleansed with:;Soap and water 06/03/22 1200   Dressing Changed 06/05/22 1530   Periwound Care Moisture barrier applied 06/01/22 1300   Off Loading Other (see comments) 06/01/22 1300   Dressing Change Due 06/02/22 06/01/22 1300   Number of days: 85            Altered Skin Integrity 03/15/22 1000 Right lateral Leg Ulceration Purple or maroon localized area of discolored intact skin or non-intact skin or a blood-filled blister. (Active)   03/15/22 1000   Altered Skin Integrity Present on Admission: suspected hospital acquired   Side: Right   Orientation: lateral   Location: Leg   Wound Number:    Is this injury device related?: No   Primary Wound Type: Ulceration   Description of Altered Skin Integrity: Purple or maroon localized area of discolored intact skin or non-intact skin or a blood-filled blister.   Removal Indication and Assessment:    Wound Outcome:    (Retired) Wound Length (cm):    (Retired) Wound Width (cm):    (Retired) Depth (cm):    Wound Description (Comments):    Removal Indications:    Wound Image    05/31/22 1300   Description of Altered Skin Integrity Full thickness tissue loss with exposed bone, tendon, or muscle. Often includes undermining and tunneling. May extend into muscle and/or supporting structures. 06/01/22 1300   Dressing Appearance Dry 06/07/22 0745   Drainage Amount Small 06/01/22 1300   Drainage Characteristics/Odor Serous;Tan;No odor 06/01/22 1300    Appearance Pink;Moist;Bone 06/01/22 1300   Tissue loss description Full thickness 06/01/22 1300   Black (%), Wound Tissue Color 0 % 05/31/22 1300   Red (%), Wound Tissue Color 90 % 05/31/22 1300   Yellow (%), Wound Tissue Color 10 % 05/31/22 1300   Periwound Area Moist 06/01/22 1300   Wound Edges Defined;Open 06/01/22 1300   Wound Length (cm) 11.5 cm 05/31/22 1300   Wound Width (cm) 4 cm 05/31/22 1300   Wound Depth (cm) 0.5 cm 05/31/22 1300   Wound Volume (cm^3) 23 cm^3 05/31/22 1300   Wound Surface Area (cm^2) 46 cm^2 05/31/22 1300   Care Cleansed with:;Soap and water 06/03/22 1200   Dressing Changed 06/05/22 1530   Periwound Care Moisture barrier applied 06/01/22 1300   Off Loading Other (see comments) 06/01/22 1300   Dressing Change Due 06/02/22 06/01/22 1300   Number of days: 85            Altered Skin Integrity 03/22/22 1300 posterior Sacral spine Other (comment) Full thickness tissue loss. Base is covered by slough and/or eschar in the wound bed (Active)   03/22/22 1300   Altered Skin Integrity Present on Admission: yes   Side:    Orientation: posterior   Location: Sacral spine   Wound Number:    Is this injury device related?: No   Primary Wound Type: Other   Description of Altered Skin Integrity: Full thickness tissue loss. Base is covered by slough and/or eschar in the wound bed   Removal Indication and Assessment:    Wound Outcome:    (Retired) Wound Length (cm):    (Retired) Wound Width (cm):    (Retired) Depth (cm):    Wound Description (Comments):    Removal Indications:    Wound Image    05/31/22 1300   Description of Altered Skin Integrity Full thickness tissue loss with exposed bone, tendon, or muscle. Often includes undermining and tunneling. May extend into muscle and/or supporting structures. 05/31/22 1300   Dressing Appearance Dry 06/07/22 0745   Drainage Amount Moderate 05/31/22 1300   Drainage Characteristics/Odor Serosanguineous;No odor 05/31/22 1300   Appearance  Pink;Tan;Fibrin;Moist;Granulating;Bone 05/31/22 1300   Tissue loss description Full thickness 05/31/22 1300   Black (%), Wound Tissue Color 0 % 05/31/22 1300   Red (%), Wound Tissue Color 90 % 05/31/22 1300   Yellow (%), Wound Tissue Color 10 % 05/31/22 1300   Periwound Area Hemosiderin Staining;Moist 05/31/22 1300   Wound Edges Defined;Open 05/31/22 1300   Wound Length (cm) 13 cm 05/31/22 1300   Wound Width (cm) 12 cm 05/31/22 1300   Wound Depth (cm) 2 cm 05/31/22 1300   Wound Volume (cm^3) 312 cm^3 05/31/22 1300   Wound Surface Area (cm^2) 156 cm^2 05/31/22 1300   Care Cleansed with:;Soap and water 06/02/22 1200   Dressing Removed;Applied;Changed 05/31/22 1300   Packing packed with;strip gauze;other (see comment) 05/22/22 1400   Periwound Care Skin barrier film applied 05/31/22 1300   Dressing Change Due 06/02/22 05/31/22 1300   Number of days: 78            Altered Skin Integrity 04/05/22 1130 Right anterior Toe, second Traumatic (Active)   04/05/22 1130   Altered Skin Integrity Present on Admission: suspected hospital acquired   Side: Right   Orientation: anterior   Location: Toe, second   Wound Number:    Is this injury device related?: No   Primary Wound Type: Traumatic   Description of Altered Skin Integrity:    Removal Indication and Assessment:    Wound Outcome:    (Retired) Wound Length (cm):    (Retired) Wound Width (cm):    (Retired) Depth (cm):    Wound Description (Comments):    Removal Indications:    Wound Image    05/31/22 1300   Dressing Appearance Open to air 06/07/22 1000   Drainage Amount None 06/01/22 1300   Drainage Characteristics/Odor Creamy 04/17/22 0800   Appearance Black;Dry 06/01/22 1300   Tissue loss description Not applicable 05/31/22 1300   Wound Edges Defined 05/31/22 1300   Care Cleansed with:;Soap and water 06/02/22 1200   Dressing Applied;Collagen 06/02/22 1200   Number of days: 64            Altered Skin Integrity 04/07/22 1130 posterior Buttocks Incontinence associated  dermatitis (Active)   04/07/22 1130   Altered Skin Integrity Present on Admission: suspected hospital acquired   Side:    Orientation: posterior   Location: Buttocks   Wound Number:    Is this injury device related?: No   Primary Wound Type: Incontinence   Description of Altered Skin Integrity:    Removal Indication and Assessment:    Wound Outcome:    (Retired) Wound Length (cm):    (Retired) Wound Width (cm):    (Retired) Depth (cm):    Wound Description (Comments):    Removal Indications:    Wound Image    05/31/22 1300   Description of Altered Skin Integrity Partial thickness tissue loss. Shallow open ulcer with a red or pink wound bed, without slough. Intact or Open/Ruptured Serum-filled blister. 05/31/22 1300   Dressing Appearance Open to air 06/07/22 1000   Drainage Amount None 05/31/22 1300   Drainage Characteristics/Odor No odor 05/31/22 1300   Appearance [place] 05/31/22 1300   Tissue loss description Partial thickness 05/12/22 1200   Black (%), Wound Tissue Color 0 % 05/31/22 1300   Red (%), Wound Tissue Color 100 % 05/31/22 1300   Yellow (%), Wound Tissue Color 0 % 05/31/22 1300   Periwound Area Dry;[place] 05/31/22 1300   Wound Edges Defined 05/31/22 1300   Care Cleansed with:;Soap and water;Moisturizing agent 05/31/22 1300   Dressing Removed;Applied 05/23/22 0000   Packing packed with;other (see comment) 05/23/22 0000   Periwound Care Moisture barrier applied 05/27/22 0705   Dressing Change Due 05/09/22 05/08/22 1200   Number of days: 62            Incision/Site 01/04/22 1444 Neck (Active)   01/04/22 1444   Present Prior to Hospital Arrival?: No   Side:    Location: Neck   Orientation:    Incision Type:    Closure Method:    Additional Comments:    Removal Indication and Assessment:    Wound Outcome:    Removal Indications:    Incision WDL WDL 05/28/22 0705   Dressing Appearance Open to air;Dry 06/07/22 1910   Drainage Amount None 05/27/22 0705   Number of days: 155            Incision/Site 05/20/22 1005  Right Foot (Active)   05/20/22 1005   Present Prior to Hospital Arrival?:    Side: Right   Location: Foot   Orientation:    Incision Type:    Closure Method:    Additional Comments:    Removal Indication and Assessment:    Wound Outcome:    Removal Indications:    Wound Image     05/31/22 1300   Incision WDL WDL 06/01/22 1300   Dressing Appearance Dry;Intact 06/07/22 1910   Drainage Amount Small 06/01/22 1300   Drainage Characteristics/Odor Green;No odor;Serous 06/01/22 1300   Appearance Dressing in place, unable to visualize 06/07/22 1100   Black (%), Wound Tissue Color 20 % 05/31/22 1300   Red (%), Wound Tissue Color 30 % 05/31/22 1300   Yellow (%), Wound Tissue Color 50 % 05/31/22 1300   Periwound Area Moist;Excoriated 06/01/22 1300   Wound Edges Defined;Open 06/01/22 1300   Wound Length (cm) 4 cm 05/31/22 1300   Wound Width (cm) 6 cm 05/31/22 1300   Wound Depth (cm) 1 cm 05/31/22 1300   Wound Volume (cm^3) 24 cm^3 05/31/22 1300   Wound Surface Area (cm^2) 24 cm^2 05/31/22 1300   Care Cleansed with:;Soap and water 06/02/22 1200   Dressing Changed 06/04/22 1400   Periwound Care Moisture barrier applied;Skin barrier film applied 06/01/22 1300   Off Loading Other (see comments) 06/01/22 1300   Dressing Change Due 06/02/22 06/01/22 1300   Number of days: 19         Assessment and Plan    Osteomyelitis   ID following for antibiotics, pending pathology and bone culture reports  Wounds to lower extremities  Clean wound with vashe  Apply sensicare around wound  Apply santyl naz thick to wound bed, cover with vashe/saline moistened 4x4  Cover and secure with abd pad and paper tape  Change daily  Turn every two hours  Low air loss mattress  Keep pressure off wound  TAP system   Consult Vascular/General surgery for vascular evaluation and debridement  Pressure ulcer of sacral region, stage 4  NPWT  Change M, TH  Turn every two hours  Low air loss mattress  Keep pressure off wound  TAP system             Signature:   HERSON Viveros  Wound Care    Date of encounter: 06/07/2022

## 2022-06-07 NOTE — ASSESSMENT & PLAN NOTE
6/1/2022  Continue with dialysis as scheduled  6/2/2022  Dialysis as scheduled  6/6/2022 The patient is doing acceptable on dialysis.

## 2022-06-07 NOTE — ASSESSMENT & PLAN NOTE
6/1/2022  Continue with dialysis as scheduled  6/2/2022  Dialysis as scheduled  6/6/2022 The patient is doing acceptable on dialysis.  6/7/2022  ESRD and is doing well on dialysis.  Volume status is acceptable.

## 2022-06-07 NOTE — PLAN OF CARE
Problem: Adult Inpatient Plan of Care  Goal: Plan of Care Review  Outcome: Ongoing, Progressing  Goal: Absence of Hospital-Acquired Illness or Injury  Outcome: Ongoing, Progressing  Goal: Optimal Comfort and Wellbeing  Outcome: Ongoing, Progressing  Goal: Readiness for Transition of Care  Outcome: Ongoing, Progressing     Problem: Adjustment to Illness (Sepsis/Septic Shock)  Goal: Optimal Coping  Outcome: Ongoing, Progressing     Problem: Fall Injury Risk  Goal: Absence of Fall and Fall-Related Injury  Outcome: Ongoing, Progressing     Problem: Impaired Wound Healing  Goal: Optimal Wound Healing  Outcome: Ongoing, Progressing

## 2022-06-07 NOTE — ASSESSMENT & PLAN NOTE
- Continue IV antibiotics per ID recommendations - stop date 07/01   - He has been treated for multidrug resistant bacteria     ID note reviewed and appreciated:  1. Continue tobramycin and meropenem, both renally dosed, until 7/1  2. 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 Monitor temperature curve and white blood cell count

## 2022-06-07 NOTE — PROGRESS NOTES
[facility]  Nephrology  Progress Note    Patient Name: Og Garcia  MRN: [id number]  Admission Date: 12/23/2021  Hospital Length of Stay: 166 days  Attending Provider: Cecil Abernathy DO   Primary Care Physician: Hector Arndt DNP, FNP-C  Principal Problem:Denice gangrene    Subjective:     HPI: The patient is known from the previous nephrology consult at [facility].  He is no at Specialty and continues to need dialysis support.      Interval History: The patient continues to do acceptable.  No acute changes.  His trach is capped.    Review of patient's allergies indicates:  No Known Allergies  Current Facility-Administered Medications   Medication Frequency    0.9%  NaCl infusion (for blood administration) Q24H PRN    0.9%  NaCl infusion PRN    acetaminophen tablet 500 mg Q6H PRN    albuterol nebulizer solution 2.5 mg Q4H PRN    albuterol-ipratropium 2.5 mg-0.5 mg/3 mL nebulizer solution 3 mL Q6H    bisacodyL EC tablet 10 mg Daily PRN    collagenase ointment Daily PRN    dextrose 50% injection 12.5 g PRN    diltiaZEM tablet 90 mg Q6H    epoetin beth-epbx injection 10,000 Units Every Mon, Wed, Fri    glucagon (human recombinant) injection 1 mg PRN    guaiFENesin 100 mg/5 ml syrup 200 mg Q6H    heparin (porcine) injection 4,000 Units PRN    heparin (porcine) injection 5,000 Units Q8H    insulin aspart U-100 injection 1-10 Units Q6H    insulin detemir U-100 injection 15 Units QHS    linezolid tablet 600 mg Q12H    meropenem (MERREM) 500 mg in sodium chloride 0.9 % 100 mL IVPB (MB+) Q24H    metoprolol tartrate (LOPRESSOR) split tablet 12.5 mg BID    pantoprazole suspension 40 mg Daily    sevelamer carbonate pwpk 0.8 g TID WM    simethicone chewable tablet 80 mg TID PRN    sodium chloride 0.9% bolus 250 mL PRN    tobramycin - pharmacy to dose pharmacy to manage frequency       Objective:     Vital Signs (Most Recent):  Temp: 98.4 °F (36.9 °C) (06/07/22 1100)  Pulse:  95 (06/07/22 1309)  Resp: (!) 35 (06/07/22 1309)  BP: (!) 126/54 (06/07/22 1214)  SpO2: 99 % (06/07/22 1309)  O2 Device (Oxygen Therapy): nasal cannula (06/07/22 1310)   Vital Signs (24h Range):  Temp:  [97.9 °F (36.6 °C)-99 °F (37.2 °C)] 98.4 °F (36.9 °C)  Pulse:  [] 95  Resp:  [26-38] 35  SpO2:  [93 %-100 %] 99 %  BP: ()/(16-61) 126/54     Weight: 78.6 kg (173 lb 4.5 oz) (06/07/22 0600)  Body mass index is 24.17 kg/m².  Body surface area is 1.98 meters squared.    I/O last 3 completed shifts:  In: 0   Out: 680 [Other:530; Stool:150]    Physical Exam  Vitals reviewed.   HENT:      Head: Normocephalic.      Mouth/Throat:      Mouth: Mucous membranes are moist.   Cardiovascular:      Rate and Rhythm: Regular rhythm.   Pulmonary:      Effort: Pulmonary effort is normal.      Breath sounds: Normal breath sounds.   Abdominal:      Palpations: Abdomen is soft.   Neurological:      Mental Status: He is alert.       Significant Labs:  BMP:   Recent Labs   Lab 06/06/22  0235   GLU 89      K 3.2*      CO2 27   BUN 59*   CREATININE 1.66*   CALCIUM 8.8     CBC:   Recent Labs   Lab 06/06/22  0235   WBC 17.04*   RBC 2.33*   HGB 7.4*   HCT 22.5*   *   MCV 96.6*   MCH 31.8*   MCHC 32.9        Significant Imaging:  Labs: Reviewed    Assessment/Plan:     ESRD (end stage renal disease)  6/1/2022  Continue with dialysis as scheduled  6/2/2022  Dialysis as scheduled  6/6/2022 The patient is doing acceptable on dialysis.  6/7/2022  ESRD and is doing well on dialysis.  Volume status is acceptable.      Type 2 diabetes mellitus without complication, without long-term current use of insulin  Continue current treatment  Chronic condition  Glucoses are acceptable.        Thank you for your consult. I will follow-up with patient. Please contact us if you have any additional questions.    Edwin Roya Jr, MD  Nephrology  [facility]

## 2022-06-07 NOTE — PLAN OF CARE
Problem: Adult Inpatient Plan of Care  Goal: Plan of Care Review  Outcome: Ongoing, Progressing  Goal: Patient-Specific Goal (Individualized)  Outcome: Ongoing, Progressing  Goal: Absence of Hospital-Acquired Illness or Injury  Outcome: Ongoing, Progressing  Goal: Optimal Comfort and Wellbeing  Outcome: Ongoing, Progressing  Goal: Readiness for Transition of Care  Outcome: Ongoing, Progressing     Problem: Bleeding (Sepsis/Septic Shock)  Goal: Absence of Bleeding  Outcome: Ongoing, Progressing     Problem: Infection Progression (Sepsis/Septic Shock)  Goal: Absence of Infection Signs and Symptoms  Outcome: Ongoing, Progressing     Problem: Fluid and Electrolyte Imbalance (Acute Kidney Injury/Impairment)  Goal: Fluid and Electrolyte Balance  Outcome: Ongoing, Progressing     Problem: Renal Function Impairment (Acute Kidney Injury/Impairment)  Goal: Effective Renal Function  Outcome: Ongoing, Progressing     Problem: Fall Injury Risk  Goal: Absence of Fall and Fall-Related Injury  Outcome: Ongoing, Progressing     Problem: Device-Related Complication Risk (Artificial Airway)  Goal: Optimal Device Function  Outcome: Ongoing, Progressing     Problem: Skin Injury Risk Increased  Goal: Skin Health and Integrity  Outcome: Ongoing, Progressing     Problem: Hemodynamic Instability (Hemodialysis)  Goal: Effective Tissue Perfusion  Outcome: Ongoing, Progressing     Problem: Diabetes Comorbidity  Goal: Blood Glucose Level Within Targeted Range  Outcome: Ongoing, Progressing     Problem: Gas Exchange Impaired  Goal: Optimal Gas Exchange  Outcome: Ongoing, Progressing

## 2022-06-07 NOTE — PROGRESS NOTES
[facility]  Nephrology  Progress Note    Patient Name: Og Garcia  MRN: [id number]  Admission Date: 12/23/2021  Hospital Length of Stay: 165 days  Attending Provider: Cecil Abernathy DO   Primary Care Physician: Hector Arndt DNP, FNP-C  Principal Problem:Denice gangrene    Subjective:     HPI: The patient is known from the previous nephrology consult at [facility].  He is no at Specialty and continues to need dialysis support.      Interval History: The patient is resting.  He tolerated dialysis earlier today.  No other changes.    Review of patient's allergies indicates:  No Known Allergies  Current Facility-Administered Medications   Medication Frequency    0.9%  NaCl infusion (for blood administration) Q24H PRN    0.9%  NaCl infusion PRN    acetaminophen tablet 500 mg Q6H PRN    albuterol nebulizer solution 2.5 mg Q4H PRN    albuterol-ipratropium 2.5 mg-0.5 mg/3 mL nebulizer solution 3 mL Q6H    bisacodyL EC tablet 10 mg Daily PRN    collagenase ointment Daily PRN    dextrose 50% injection 12.5 g PRN    diltiaZEM tablet 90 mg Q6H    epoetin beth-epbx injection 10,000 Units Every Mon, Wed, Fri    glucagon (human recombinant) injection 1 mg PRN    guaiFENesin 100 mg/5 ml syrup 200 mg Q6H    heparin (porcine) injection 4,000 Units PRN    heparin (porcine) injection 5,000 Units Q8H    insulin aspart U-100 injection 1-10 Units Q6H    insulin detemir U-100 injection 15 Units QHS    linezolid tablet 600 mg Q12H    meropenem (MERREM) 500 mg in sodium chloride 0.9 % 100 mL IVPB (MB+) Q24H    metoprolol tartrate (LOPRESSOR) split tablet 12.5 mg BID    pantoprazole suspension 40 mg Daily    sevelamer carbonate pwpk 0.8 g TID WM    simethicone chewable tablet 80 mg TID PRN    sodium chloride 0.9% bolus 250 mL PRN    tobramycin - pharmacy to dose pharmacy to manage frequency       Objective:     Vital Signs (Most Recent):  Temp: 99 °F (37.2 °C) (06/06/22 1900)  Pulse:  87 (06/06/22 1900)  Resp: (!) 30 (06/06/22 1900)  BP: (!) 95/46 (06/06/22 1900)  SpO2: 100 % (06/06/22 1900)  O2 Device (Oxygen Therapy): Trach Collar (06/06/22 1720)   Vital Signs (24h Range):  Temp:  [97.8 °F (36.6 °C)-100.3 °F (37.9 °C)] 99 °F (37.2 °C)  Pulse:  [] 87  Resp:  [26-40] 30  SpO2:  [95 %-100 %] 100 %  BP: ()/(34-68) 95/46     Weight: 78 kg (171 lb 15.3 oz) (06/06/22 0600)  Body mass index is 23.98 kg/m².  Body surface area is 1.98 meters squared.    I/O last 3 completed shifts:  In: 1230 [P.O.:120; NG/GT:810; IV Piggyback:300]  Out: 780 [Other:530; Stool:250]    Physical Exam  Vitals reviewed.   HENT:      Head: Normocephalic.      Mouth/Throat:      Mouth: Mucous membranes are moist.   Cardiovascular:      Rate and Rhythm: Regular rhythm.   Pulmonary:      Effort: Pulmonary effort is normal.   Abdominal:      Palpations: Abdomen is soft.   Neurological:      Mental Status: He is alert.       Significant Labs:  BMP:   Recent Labs   Lab 06/06/22  0235   GLU 89      K 3.2*      CO2 27   BUN 59*   CREATININE 1.66*   CALCIUM 8.8     CBC:   Recent Labs   Lab 06/06/22  0235   WBC 17.04*   RBC 2.33*   HGB 7.4*   HCT 22.5*   *   MCV 96.6*   MCH 31.8*   MCHC 32.9        Significant Imaging:  Labs: Reviewed    Assessment/Plan:     ESRD (end stage renal disease)  6/1/2022  Continue with dialysis as scheduled  6/2/2022  Dialysis as scheduled  6/6/2022 The patient is doing acceptable on dialysis.      Type 2 diabetes mellitus without complication, without long-term current use of insulin  Continue current treatment  Chronic condition  Glucoses are acceptable.        Thank you for your consult. I will follow-up with patient. Please contact us if you have any additional questions.    Edwin Pryor Jr, MD  Nephrology  [facility]

## 2022-06-07 NOTE — ASSESSMENT & PLAN NOTE
- has been off vent over a week. Trach downsized to #6 -- will try to cap as tolerated   - capping at times, doing well. Hopeful for decannulation later this week

## 2022-06-07 NOTE — SUBJECTIVE & OBJECTIVE
Interval History: The patient continues to do acceptable.  No acute changes.  His trach is capped.    Review of patient's allergies indicates:  No Known Allergies  Current Facility-Administered Medications   Medication Frequency    0.9%  NaCl infusion (for blood administration) Q24H PRN    0.9%  NaCl infusion PRN    acetaminophen tablet 500 mg Q6H PRN    albuterol nebulizer solution 2.5 mg Q4H PRN    albuterol-ipratropium 2.5 mg-0.5 mg/3 mL nebulizer solution 3 mL Q6H    bisacodyL EC tablet 10 mg Daily PRN    collagenase ointment Daily PRN    dextrose 50% injection 12.5 g PRN    diltiaZEM tablet 90 mg Q6H    epoetin beth-epbx injection 10,000 Units Every Mon, Wed, Fri    glucagon (human recombinant) injection 1 mg PRN    guaiFENesin 100 mg/5 ml syrup 200 mg Q6H    heparin (porcine) injection 4,000 Units PRN    heparin (porcine) injection 5,000 Units Q8H    insulin aspart U-100 injection 1-10 Units Q6H    insulin detemir U-100 injection 15 Units QHS    linezolid tablet 600 mg Q12H    meropenem (MERREM) 500 mg in sodium chloride 0.9 % 100 mL IVPB (MB+) Q24H    metoprolol tartrate (LOPRESSOR) split tablet 12.5 mg BID    pantoprazole suspension 40 mg Daily    sevelamer carbonate pwpk 0.8 g TID WM    simethicone chewable tablet 80 mg TID PRN    sodium chloride 0.9% bolus 250 mL PRN    tobramycin - pharmacy to dose pharmacy to manage frequency       Objective:     Vital Signs (Most Recent):  Temp: 98.4 °F (36.9 °C) (06/07/22 1100)  Pulse: 95 (06/07/22 1309)  Resp: (!) 35 (06/07/22 1309)  BP: (!) 126/54 (06/07/22 1214)  SpO2: 99 % (06/07/22 1309)  O2 Device (Oxygen Therapy): nasal cannula (06/07/22 1310)   Vital Signs (24h Range):  Temp:  [97.9 °F (36.6 °C)-99 °F (37.2 °C)] 98.4 °F (36.9 °C)  Pulse:  [] 95  Resp:  [26-38] 35  SpO2:  [93 %-100 %] 99 %  BP: ()/(16-61) 126/54     Weight: 78.6 kg (173 lb 4.5 oz) (06/07/22 0600)  Body mass index is 24.17 kg/m².  Body surface area is 1.98 meters squared.    I/O last  3 completed shifts:  In: 0   Out: 680 [Other:530; Stool:150]    Physical Exam  Vitals reviewed.   HENT:      Head: Normocephalic.      Mouth/Throat:      Mouth: Mucous membranes are moist.   Cardiovascular:      Rate and Rhythm: Regular rhythm.   Pulmonary:      Effort: Pulmonary effort is normal.      Breath sounds: Normal breath sounds.   Abdominal:      Palpations: Abdomen is soft.   Neurological:      Mental Status: He is alert.       Significant Labs:  BMP:   Recent Labs   Lab 06/06/22  0235   GLU 89      K 3.2*      CO2 27   BUN 59*   CREATININE 1.66*   CALCIUM 8.8     CBC:   Recent Labs   Lab 06/06/22  0235   WBC 17.04*   RBC 2.33*   HGB 7.4*   HCT 22.5*   *   MCV 96.6*   MCH 31.8*   MCHC 32.9        Significant Imaging:  Labs: Reviewed

## 2022-06-07 NOTE — SUBJECTIVE & OBJECTIVE
Interval History: The patient is resting.  He tolerated dialysis earlier today.  No other changes.    Review of patient's allergies indicates:  No Known Allergies  Current Facility-Administered Medications   Medication Frequency    0.9%  NaCl infusion (for blood administration) Q24H PRN    0.9%  NaCl infusion PRN    acetaminophen tablet 500 mg Q6H PRN    albuterol nebulizer solution 2.5 mg Q4H PRN    albuterol-ipratropium 2.5 mg-0.5 mg/3 mL nebulizer solution 3 mL Q6H    bisacodyL EC tablet 10 mg Daily PRN    collagenase ointment Daily PRN    dextrose 50% injection 12.5 g PRN    diltiaZEM tablet 90 mg Q6H    epoetin beth-epbx injection 10,000 Units Every Mon, Wed, Fri    glucagon (human recombinant) injection 1 mg PRN    guaiFENesin 100 mg/5 ml syrup 200 mg Q6H    heparin (porcine) injection 4,000 Units PRN    heparin (porcine) injection 5,000 Units Q8H    insulin aspart U-100 injection 1-10 Units Q6H    insulin detemir U-100 injection 15 Units QHS    linezolid tablet 600 mg Q12H    meropenem (MERREM) 500 mg in sodium chloride 0.9 % 100 mL IVPB (MB+) Q24H    metoprolol tartrate (LOPRESSOR) split tablet 12.5 mg BID    pantoprazole suspension 40 mg Daily    sevelamer carbonate pwpk 0.8 g TID WM    simethicone chewable tablet 80 mg TID PRN    sodium chloride 0.9% bolus 250 mL PRN    tobramycin - pharmacy to dose pharmacy to manage frequency       Objective:     Vital Signs (Most Recent):  Temp: 99 °F (37.2 °C) (06/06/22 1900)  Pulse: 87 (06/06/22 1900)  Resp: (!) 30 (06/06/22 1900)  BP: (!) 95/46 (06/06/22 1900)  SpO2: 100 % (06/06/22 1900)  O2 Device (Oxygen Therapy): Trach Collar (06/06/22 1720)   Vital Signs (24h Range):  Temp:  [97.8 °F (36.6 °C)-100.3 °F (37.9 °C)] 99 °F (37.2 °C)  Pulse:  [] 87  Resp:  [26-40] 30  SpO2:  [95 %-100 %] 100 %  BP: ()/(34-68) 95/46     Weight: 78 kg (171 lb 15.3 oz) (06/06/22 0600)  Body mass index is 23.98 kg/m².  Body surface area is 1.98 meters squared.    I/O last 3  completed shifts:  In: 1230 [P.O.:120; NG/GT:810; IV Piggyback:300]  Out: 780 [Other:530; Stool:250]    Physical Exam  Vitals reviewed.   HENT:      Head: Normocephalic.      Mouth/Throat:      Mouth: Mucous membranes are moist.   Cardiovascular:      Rate and Rhythm: Regular rhythm.   Pulmonary:      Effort: Pulmonary effort is normal.   Abdominal:      Palpations: Abdomen is soft.   Neurological:      Mental Status: He is alert.       Significant Labs:  BMP:   Recent Labs   Lab 06/06/22  0235   GLU 89      K 3.2*      CO2 27   BUN 59*   CREATININE 1.66*   CALCIUM 8.8     CBC:   Recent Labs   Lab 06/06/22  0235   WBC 17.04*   RBC 2.33*   HGB 7.4*   HCT 22.5*   *   MCV 96.6*   MCH 31.8*   MCHC 32.9        Significant Imaging:  Labs: Reviewed

## 2022-06-08 NOTE — SUBJECTIVE & OBJECTIVE
Interval History: The patient is resting.  No acute changes.      Review of patient's allergies indicates:  No Known Allergies  Current Facility-Administered Medications   Medication Frequency    0.9%  NaCl infusion (for blood administration) Q24H PRN    0.9%  NaCl infusion PRN    acetaminophen tablet 500 mg Q6H PRN    albuterol nebulizer solution 2.5 mg Q4H PRN    albuterol-ipratropium 2.5 mg-0.5 mg/3 mL nebulizer solution 3 mL Q6H    bisacodyL EC tablet 10 mg Daily PRN    collagenase ointment Daily PRN    dextrose 50% injection 12.5 g PRN    diltiaZEM tablet 90 mg Q6H    epoetin beth-epbx injection 10,000 Units Every Mon, Wed, Fri    glucagon (human recombinant) injection 1 mg PRN    guaiFENesin 100 mg/5 ml syrup 200 mg Q6H    heparin (porcine) injection 4,000 Units PRN    heparin (porcine) injection 5,000 Units Q8H    insulin aspart U-100 injection 1-10 Units Q6H    insulin detemir U-100 injection 15 Units QHS    linezolid tablet 600 mg Q12H    meropenem (MERREM) 500 mg in sodium chloride 0.9 % 100 mL IVPB (MB+) Q24H    metoprolol tartrate (LOPRESSOR) split tablet 12.5 mg BID    pantoprazole suspension 40 mg Daily    sevelamer carbonate pwpk 0.8 g TID WM    simethicone chewable tablet 80 mg TID PRN    sodium chloride 0.9% bolus 250 mL PRN    tobramycin - pharmacy to dose pharmacy to manage frequency       Objective:     Vital Signs (Most Recent):  Temp: 98.7 °F (37.1 °C) (06/08/22 1100)  Pulse: 94 (06/08/22 1234)  Resp: (!) 24 (06/08/22 1234)  BP: (!) 106/43 (06/08/22 1216)  SpO2: 99 % (06/08/22 1234)  O2 Device (Oxygen Therapy): nasal cannula (06/08/22 0830)   Vital Signs (24h Range):  Temp:  [98.2 °F (36.8 °C)-99.9 °F (37.7 °C)] 98.7 °F (37.1 °C)  Pulse:  [] 94  Resp:  [24-39] 24  SpO2:  [96 %-100 %] 99 %  BP: ()/(32-72) 106/43     Weight: 79.7 kg (175 lb 11.3 oz) (06/08/22 0600)  Body mass index is 24.51 kg/m².  Body surface area is 2 meters squared.    I/O last 3 completed shifts:  In: 1490  [P.O.:240; NG/GT:1150; IV Piggyback:100]  Out: 600 [Stool:600]    Physical Exam  Vitals reviewed.   HENT:      Head: Normocephalic.      Mouth/Throat:      Mouth: Mucous membranes are moist.   Cardiovascular:      Rate and Rhythm: Regular rhythm.   Pulmonary:      Effort: Pulmonary effort is normal.   Abdominal:      Palpations: Abdomen is soft.   Neurological:      Mental Status: He is alert.       Significant Labs:  BMP:   Recent Labs   Lab 06/06/22  0235   GLU 89      K 3.2*      CO2 27   BUN 59*   CREATININE 1.66*   CALCIUM 8.8     CBC:   Recent Labs   Lab 06/06/22  0235   WBC 17.04*   RBC 2.33*   HGB 7.4*   HCT 22.5*   *   MCV 96.6*   MCH 31.8*   MCHC 32.9        Significant Imaging:  Labs: Reviewed

## 2022-06-08 NOTE — PROGRESS NOTES
Pharmacokinetic Follow Up: Tobramycin    Assessment of levels:   Tobra random level is within the desired range of </= 2    Regimen Plan:   Tobramycin 220 mg IV x 1 today with a random tobra level on 6/10 at 0600    Drug levels (last 3 results):  No results for input(s): AMIKACINPEAK, AMIKACINTROU, AMIKACINRAND, AMIKACIN in the last 72 hours.    No results for input(s): GENTAMICIN, GENTPEAK, GENTTROUGH, GENT10, GENT12, GENT8, GENTRANDOM in the last 72 hours.    Recent Labs   Lab Result Units 06/08/22  0421   Tobramycin, Random µg/mL <0.3       Pharmacy will continue to monitor.    Please contact pharmacy at extension [phone] with any questions regarding this assessment.    Patient brief summary:  Og Garcia is a 67 y.o. male initiated on aminoglycoside therapy for treatment of bone/joint infection    Drug Allergies:   Review of patient's allergies indicates:  No Known Allergies    Actual Body Weight:   79.7 kg    Adjust Body Weight:   77.1 kg    Ideal Body Weight:  75.3 kg    Renal Function:   Estimated Creatinine Clearance: 46 mL/min (A) (based on SCr of 1.66 mg/dL (H)).,     Dialysis Method (if applicable):  intermittent HD    CBC (last 72 hours):  Recent Labs   Lab Result Units 06/06/22  0235   WBC K/uL 17.04*   Hemoglobin g/dL 7.4*   Hematocrit % 22.5*   Platelet Count K/uL 495*   Lymphocytes % % 19.0*   Monocytes % % 9.8*   Eosinophils % % 5.1*   Basophils % % 0.5       Metabolic Panel (last 72 hours):  Recent Labs   Lab Result Units 06/06/22  0235   Sodium mmol/L 136   Potassium mmol/L 3.2*   Chloride mmol/L 100   CO2 mmol/L 27   Glucose mg/dL 89   BUN mg/dL 59*   Creatinine mg/dL 1.66*       Aminoglycoside Administrations:  aminoglycosides given in last 96 hours        No antibiotic orders with administrations found.                    Microbiologic Results:  Microbiology Results (last 7 days)       ** No results found for the last 168 hours. **

## 2022-06-08 NOTE — PROGRESS NOTES
[facility]  Pulmonology  Progress Note    Patient Name: Og Garcia  MRN: [id number]  Admission Date: 12/23/2021  Hospital Length of Stay: 167 days  Code Status: Prior  Attending Provider: Cecil Abernathy DO  Primary Care Provider: Hector Arndt DNP, FNP-C   Principal Problem: Denice gangrene    Subjective:     Interval History: No acute events overnight. He is currently resting comfortably. Oxygenating adequately on trach collar. He is afebrile this am and vital signs are stable.    Objective:     Vital Signs (Most Recent):  Temp: 99.9 °F (37.7 °C) (06/08/22 1500)  Pulse: 95 (06/08/22 1500)  Resp: (!) 28 (06/08/22 1500)  BP: (!) 94/48 (06/08/22 1500)  SpO2: 100 % (06/08/22 1500)   Vital Signs (24h Range):  Temp:  [98.2 °F (36.8 °C)-99.9 °F (37.7 °C)] 99.9 °F (37.7 °C)  Pulse:  [] 95  Resp:  [24-39] 28  SpO2:  [96 %-100 %] 100 %  BP: ()/(32-72) 94/48     Weight: 79.7 kg (175 lb 11.3 oz)  Body mass index is 24.51 kg/m².      Intake/Output Summary (Last 24 hours) at 6/8/2022 1628  Last data filed at 6/8/2022 1205  Gross per 24 hour   Intake 1320 ml   Output 2150 ml   Net -830 ml         Physical Exam    Vents:  Vent Mode: Standby (05/28/22 0415)  Ventilator Initiated: No (04/23/22 1934)  Set Rate: 0 BPM (05/18/22 0516)  Vt Set: 0 mL (05/18/22 0516)  Pressure Support: 15 cmH20 (05/22/22 0044)  PEEP/CPAP: 5 cmH20 (05/22/22 0044)  Oxygen Concentration (%): 28 (06/08/22 0830)  Peak Airway Pressure: 21 cmH2O (05/22/22 0044)  Plateau Pressure: 20 cmH20 (03/07/22 0500)  Total Ve: 7.8 mL (05/22/22 0044)  F/VT Ratio<105 (RSBI): (!) 22.64 (05/22/22 0044)    Lines/Drains/Airways       Central Venous Catheter Line  Duration                  Hemodialysis Catheter 12/30/21 0900 right internal jugular 160 days              Drain  Duration                  Colostomy 12/18/21 1030 Descending/sigmoid  days         Gastrostomy/Enterostomy 03/09/22 1436 Percutaneous endoscopic  gastrostomy (PEG) midline feeding 91 days              Airway  Duration                  Surgical Airway 04/27/22 0856 Marychuy;Other (Comment) Cuffed;Long 42 days              Peripheral Intravenous Line  Duration                  Peripheral IV - Single Lumen 06/03/22 1527 18 G Anterior;Right Forearm 5 days                    Significant Labs:    CBC/Anemia Profile:  No results for input(s): WBC, HGB, HCT, PLT, MCV, RDW, IRON, FERRITIN, RETIC, FOLATE, [id number], OCCULTBLOOD in the last 48 hours.       Chemistries:  No results for input(s): NA, K, CL, CO2, BUN, CREATININE, CALCIUM, ALBUMIN, PROT, BILITOT, ALKPHOS, ALT, AST, GLUCOSE, MG, PHOS in the last 48 hours.      All pertinent labs within the past 24 hours have been reviewed.    Significant Imaging:  I have reviewed all pertinent imaging results/findings within the past 24 hours.    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 Continue tobramycin and meropenem, both renally dosed, until 7/1  2. 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 Monitor temperature curve and white blood cell count    Chronic respiratory failure    - has been off vent over a week. Trach downsized to #6 -- will try to cap as tolerated   - capping during the day and removing overnight.   - 24 hour capping in the next day or two.  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

## 2022-06-08 NOTE — ASSESSMENT & PLAN NOTE
6/1/2022  Continue with dialysis as scheduled  6/2/2022  Dialysis as scheduled  6/6/2022 The patient is doing acceptable on dialysis.  6/7/2022  ESRD and is doing well on dialysis.  Volume status is acceptable.  6/8/2022  Continue current dialysis schedule.

## 2022-06-08 NOTE — PLAN OF CARE
Problem: Communication Impairment (Artificial Airway)  Goal: Effective Communication  Outcome: Ongoing, Progressing  Intervention: Ensure Effective Communication  Flowsheets (Taken 6/8/2022 6892)  Communication Enhancement Strategies: verbal communication attempts encouraged  Family/Support System Care: presence promoted  Trust Relationship/Rapport: care explained

## 2022-06-08 NOTE — PROGRESS NOTES
[facility]  Nephrology  Progress Note    Patient Name: Og Garcia  MRN: [id number]  Admission Date: 12/23/2021  Hospital Length of Stay: 167 days  Attending Provider: Cecil Abernathy DO   Primary Care Physician: Hector Arndt DNP, FNP-C  Principal Problem:Denice gangrene    Subjective:     HPI: The patient is known from the previous nephrology consult at [facility].  He is no at Specialty and continues to need dialysis support.      Interval History: The patient is resting.  No acute changes.      Review of patient's allergies indicates:  No Known Allergies  Current Facility-Administered Medications   Medication Frequency    0.9%  NaCl infusion (for blood administration) Q24H PRN    0.9%  NaCl infusion PRN    acetaminophen tablet 500 mg Q6H PRN    albuterol nebulizer solution 2.5 mg Q4H PRN    albuterol-ipratropium 2.5 mg-0.5 mg/3 mL nebulizer solution 3 mL Q6H    bisacodyL EC tablet 10 mg Daily PRN    collagenase ointment Daily PRN    dextrose 50% injection 12.5 g PRN    diltiaZEM tablet 90 mg Q6H    epoetin beth-epbx injection 10,000 Units Every Mon, Wed, Fri    glucagon (human recombinant) injection 1 mg PRN    guaiFENesin 100 mg/5 ml syrup 200 mg Q6H    heparin (porcine) injection 4,000 Units PRN    heparin (porcine) injection 5,000 Units Q8H    insulin aspart U-100 injection 1-10 Units Q6H    insulin detemir U-100 injection 15 Units QHS    linezolid tablet 600 mg Q12H    meropenem (MERREM) 500 mg in sodium chloride 0.9 % 100 mL IVPB (MB+) Q24H    metoprolol tartrate (LOPRESSOR) split tablet 12.5 mg BID    pantoprazole suspension 40 mg Daily    sevelamer carbonate pwpk 0.8 g TID WM    simethicone chewable tablet 80 mg TID PRN    sodium chloride 0.9% bolus 250 mL PRN    tobramycin - pharmacy to dose pharmacy to manage frequency       Objective:     Vital Signs (Most Recent):  Temp: 98.7 °F (37.1 °C) (06/08/22 1100)  Pulse: 94 (06/08/22 1234)  Resp: (!) 24  (06/08/22 1234)  BP: (!) 106/43 (06/08/22 1216)  SpO2: 99 % (06/08/22 1234)  O2 Device (Oxygen Therapy): nasal cannula (06/08/22 0830)   Vital Signs (24h Range):  Temp:  [98.2 °F (36.8 °C)-99.9 °F (37.7 °C)] 98.7 °F (37.1 °C)  Pulse:  [] 94  Resp:  [24-39] 24  SpO2:  [96 %-100 %] 99 %  BP: ()/(32-72) 106/43     Weight: 79.7 kg (175 lb 11.3 oz) (06/08/22 0600)  Body mass index is 24.51 kg/m².  Body surface area is 2 meters squared.    I/O last 3 completed shifts:  In: 1490 [P.O.:240; NG/GT:1150; IV Piggyback:100]  Out: 600 [Stool:600]    Physical Exam  Vitals reviewed.   HENT:      Head: Normocephalic.      Mouth/Throat:      Mouth: Mucous membranes are moist.   Cardiovascular:      Rate and Rhythm: Regular rhythm.   Pulmonary:      Effort: Pulmonary effort is normal.   Abdominal:      Palpations: Abdomen is soft.   Neurological:      Mental Status: He is alert.       Significant Labs:  BMP:   Recent Labs   Lab 06/06/22  0235   GLU 89      K 3.2*      CO2 27   BUN 59*   CREATININE 1.66*   CALCIUM 8.8     CBC:   Recent Labs   Lab 06/06/22  0235   WBC 17.04*   RBC 2.33*   HGB 7.4*   HCT 22.5*   *   MCV 96.6*   MCH 31.8*   MCHC 32.9        Significant Imaging:  Labs: Reviewed    Assessment/Plan:     ESRD (end stage renal disease)  6/1/2022  Continue with dialysis as scheduled  6/2/2022  Dialysis as scheduled  6/6/2022 The patient is doing acceptable on dialysis.  6/7/2022  ESRD and is doing well on dialysis.  Volume status is acceptable.  6/8/2022  Continue current dialysis schedule.      Hypertension    5/23/2022 Blood pressure has been stable.  5/31/2022 The patient's blood pressure is stable.   6/8/2022  Again, hemodynamics are acceptable.        Thank you for your consult. I will follow-up with patient. Please contact us if you have any additional questions.    Edwin Pryor Jr, MD  Nephrology  [facility]

## 2022-06-08 NOTE — CONSULTS
Vascular Surgery    Prolonged hospitalization for 6 months on trach collar  Multiple debridements for Necrotizing faschiitis  Previous debrdidement feet per dr phillip  SUSAN 0.65 and 0.7  Nonambulatory no further vascular work up  Dressing to feet c/d/i  Necrotic nonhealing wounds  Wound care recommend further debridemen on rit 5th toe amp and left heel  Npo after mn for debridement in OR Dr pacheco/[facility]

## 2022-06-08 NOTE — ASSESSMENT & PLAN NOTE
- has been off vent over a week. Trach downsized to #6 -- will try to cap as tolerated   - capping during the day and removing overnight.   - 24 hour capping in the next day or two.  - hopefully can get trach removed soon

## 2022-06-08 NOTE — PLAN OF CARE
Problem: Adjustment to Illness (Sepsis/Septic Shock)  Goal: Optimal Coping  Outcome: Ongoing, Progressing     Problem: Bleeding (Sepsis/Septic Shock)  Goal: Absence of Bleeding  Outcome: Ongoing, Progressing     Problem: Glycemic Control Impaired (Sepsis/Septic Shock)  Goal: Blood Glucose Level Within Desired Range  Outcome: Ongoing, Progressing     Problem: Infection Progression (Sepsis/Septic Shock)  Goal: Absence of Infection Signs and Symptoms  Outcome: Ongoing, Progressing     Problem: Nutrition Impaired (Sepsis/Septic Shock)  Goal: Optimal Nutrition Intake  Outcome: Ongoing, Progressing     Problem: Fluid and Electrolyte Imbalance (Acute Kidney Injury/Impairment)  Goal: Fluid and Electrolyte Balance  Outcome: Ongoing, Progressing     Problem: Oral Intake Inadequate (Acute Kidney Injury/Impairment)  Goal: Optimal Nutrition Intake  Outcome: Ongoing, Progressing     Problem: Renal Function Impairment (Acute Kidney Injury/Impairment)  Goal: Effective Renal Function  Outcome: Ongoing, Progressing     Problem: Infection  Goal: Absence of Infection Signs and Symptoms  Outcome: Ongoing, Progressing     Problem: Fall Injury Risk  Goal: Absence of Fall and Fall-Related Injury  Outcome: Ongoing, Progressing     Problem: Communication Impairment (Artificial Airway)  Goal: Effective Communication  Outcome: Ongoing, Progressing     Problem: Infection (Hemodialysis)  Goal: Absence of Infection Signs and Symptoms  Outcome: Ongoing, Progressing     Problem: Hemodynamic Instability (Hemodialysis)  Goal: Effective Tissue Perfusion  Outcome: Ongoing, Progressing     Problem: Impaired Wound Healing  Goal: Optimal Wound Healing  Outcome: Ongoing, Progressing     Problem: Gas Exchange Impaired  Goal: Optimal Gas Exchange  Outcome: Ongoing, Progressing

## 2022-06-08 NOTE — SUBJECTIVE & OBJECTIVE
Interval History: No acute events overnight. He is currently resting comfortably. Oxygenating adequately on trach collar. He is afebrile this am and vital signs are stable.    Objective:     Vital Signs (Most Recent):  Temp: 99.9 °F (37.7 °C) (06/08/22 1500)  Pulse: 95 (06/08/22 1500)  Resp: (!) 28 (06/08/22 1500)  BP: (!) 94/48 (06/08/22 1500)  SpO2: 100 % (06/08/22 1500)   Vital Signs (24h Range):  Temp:  [98.2 °F (36.8 °C)-99.9 °F (37.7 °C)] 99.9 °F (37.7 °C)  Pulse:  [] 95  Resp:  [24-39] 28  SpO2:  [96 %-100 %] 100 %  BP: ()/(32-72) 94/48     Weight: 79.7 kg (175 lb 11.3 oz)  Body mass index is 24.51 kg/m².      Intake/Output Summary (Last 24 hours) at 6/8/2022 1628  Last data filed at 6/8/2022 1205  Gross per 24 hour   Intake 1320 ml   Output 2150 ml   Net -830 ml         Physical Exam    Vents:  Vent Mode: Standby (05/28/22 0415)  Ventilator Initiated: No (04/23/22 1934)  Set Rate: 0 BPM (05/18/22 0516)  Vt Set: 0 mL (05/18/22 0516)  Pressure Support: 15 cmH20 (05/22/22 0044)  PEEP/CPAP: 5 cmH20 (05/22/22 0044)  Oxygen Concentration (%): 28 (06/08/22 0830)  Peak Airway Pressure: 21 cmH2O (05/22/22 0044)  Plateau Pressure: 20 cmH20 (03/07/22 0500)  Total Ve: 7.8 mL (05/22/22 0044)  F/VT Ratio<105 (RSBI): (!) 22.64 (05/22/22 0044)    Lines/Drains/Airways       Central Venous Catheter Line  Duration                  Hemodialysis Catheter 12/30/21 0900 right internal jugular 160 days              Drain  Duration                  Colostomy 12/18/21 1030 Descending/sigmoid  days         Gastrostomy/Enterostomy 03/09/22 1436 Percutaneous endoscopic gastrostomy (PEG) midline feeding 91 days              Airway  Duration                  Surgical Airway 04/27/22 0856 Marychuy;Other (Comment) Cuffed;Long 42 days              Peripheral Intravenous Line  Duration                  Peripheral IV - Single Lumen 06/03/22 1527 18 G Anterior;Right Forearm 5 days                    Significant  Labs:    CBC/Anemia Profile:  No results for input(s): WBC, HGB, HCT, PLT, MCV, RDW, IRON, FERRITIN, RETIC, FOLATE, [id number], OCCULTBLOOD in the last 48 hours.       Chemistries:  No results for input(s): NA, K, CL, CO2, BUN, CREATININE, CALCIUM, ALBUMIN, PROT, BILITOT, ALKPHOS, ALT, AST, GLUCOSE, MG, PHOS in the last 48 hours.      All pertinent labs within the past 24 hours have been reviewed.    Significant Imaging:  I have reviewed all pertinent imaging results/findings within the past 24 hours.

## 2022-06-08 NOTE — ASSESSMENT & PLAN NOTE
5/23/2022 Blood pressure has been stable.  5/31/2022 The patient's blood pressure is stable.   6/8/2022  Again, hemodynamics are acceptable.

## 2022-06-09 NOTE — PROGRESS NOTES
[facility]  Nephrology  Progress Note    Patient Name: Og Garcia  MRN: [id number]  Admission Date: 12/23/2021  Hospital Length of Stay: 168 days  Attending Provider: Cecil Abernathy DO   Primary Care Physician: Hector Arndt DNP, FNP-C  Principal Problem:Denice gangrene    Subjective:     HPI: The patient is known from the previous nephrology consult at [facility].  He is no at Specialty and continues to need dialysis support.      Interval History: The patient is resting.  He is preparing for further wound debridement today.  No other changes.    Review of patient's allergies indicates:  No Known Allergies  Current Facility-Administered Medications   Medication Frequency    0.9%  NaCl infusion (for blood administration) Q24H PRN    0.9%  NaCl infusion PRN    acetaminophen tablet 500 mg Q6H PRN    albuterol nebulizer solution 2.5 mg Q4H PRN    albuterol-ipratropium 2.5 mg-0.5 mg/3 mL nebulizer solution 3 mL Q6H    bisacodyL EC tablet 10 mg Daily PRN    collagenase ointment Daily PRN    dextrose 50% injection 12.5 g PRN    diltiaZEM tablet 90 mg Q6H    epoetin beth-epbx injection 10,000 Units Every Mon, Wed, Fri    glucagon (human recombinant) injection 1 mg PRN    guaiFENesin 100 mg/5 ml syrup 200 mg Q6H    heparin (porcine) injection 4,000 Units PRN    heparin (porcine) injection 5,000 Units Q8H    insulin aspart U-100 injection 1-10 Units Q6H    insulin detemir U-100 injection 15 Units QHS    linezolid tablet 600 mg Q12H    meropenem (MERREM) 500 mg in sodium chloride 0.9 % 100 mL IVPB (MB+) Q24H    metoprolol tartrate (LOPRESSOR) split tablet 12.5 mg BID    pantoprazole suspension 40 mg Daily    sevelamer carbonate pwpk 0.8 g TID WM    simethicone chewable tablet 80 mg TID PRN    sodium chloride 0.9% bolus 250 mL PRN    tobramycin - pharmacy to dose pharmacy to manage frequency       Objective:     Vital Signs (Most Recent):  Temp: 99.6 °F (37.6 °C)  (06/09/22 1500)  Pulse: (!) 114 (06/09/22 1600)  Resp: (!) 30 (06/09/22 1600)  BP: 103/61 (06/09/22 1600)  SpO2: 100 % (06/09/22 1600)  O2 Device (Oxygen Therapy): nasal cannula (06/09/22 0749)   Vital Signs (24h Range):  Temp:  [99.3 °F (37.4 °C)-100 °F (37.8 °C)] 99.6 °F (37.6 °C)  Pulse:  [] 114  Resp:  [19-37] 30  SpO2:  [95 %-100 %] 100 %  BP: ()/(39-76) 103/61     Weight: 73.5 kg (162 lb 0.6 oz) (06/09/22 0600)  Body mass index is 22.6 kg/m².  Body surface area is 1.92 meters squared.    I/O last 3 completed shifts:  In: 1710 [Other:240; NG/GT:1270; IV Piggyback:200]  Out: 2100 [Other:1500; Stool:600]    Physical Exam  Vitals reviewed.   Constitutional:       Appearance: He is obese.   HENT:      Head: Normocephalic.   Cardiovascular:      Rate and Rhythm: Regular rhythm.   Pulmonary:      Effort: Pulmonary effort is normal.   Abdominal:      Palpations: Abdomen is soft.   Neurological:      Mental Status: He is alert.       Significant Labs:  BMP:   Recent Labs   Lab 06/06/22  0235   GLU 89      K 3.2*      CO2 27   BUN 59*   CREATININE 1.66*   CALCIUM 8.8     CBC:   Recent Labs   Lab 06/06/22  0235   WBC 17.04*   RBC 2.33*   HGB 7.4*   HCT 22.5*   *   MCV 96.6*   MCH 31.8*   MCHC 32.9        Significant Imaging:  Labs: Reviewed    Assessment/Plan:     ESRD (end stage renal disease)  6/1/2022  Continue with dialysis as scheduled  6/2/2022  Dialysis as scheduled  6/6/2022 The patient is doing acceptable on dialysis.  6/7/2022  ESRD and is doing well on dialysis.  Volume status is acceptable.  6/8/2022  Continue current dialysis schedule.  6/9/2022  Dialysis schedule continued on MWF        Thank you for your consult. I will follow-up with patient. Please contact us if you have any additional questions.    Edwin Pryor Jr, MD  Nephrology  [facility]

## 2022-06-09 NOTE — NURSING
Received pt in bed, no acute distress noted. Safety measures intact, cb near. Wound vac to sacral at 125mmhg suctioning, LUQ ped intact with Suplena infusing at 60ml/hr. Right subcl HD cath intact with signs of infection dress dated for 6/8. Right forearm ext. Cath intact with signs of infection or infiltration dressing dated 6/8. llq colostomy intact with mod amt of brown mushy stool noted. Dressing noted to austyn feet intact and dated for 6/8, waffle boots, scd hose all intact. Pt laying on tap system, and on LIDA bed. Call bell near will continue to monitor.

## 2022-06-09 NOTE — NURSING
Receive consent for anesthesia this am for debridement of austyn lower extremities, from [facility], witness by ARASH Taylor RN.

## 2022-06-09 NOTE — PLAN OF CARE
Problem: Adult Inpatient Plan of Care  Goal: Plan of Care Review  Outcome: Ongoing, Progressing  Goal: Patient-Specific Goal (Individualized)  Outcome: Ongoing, Progressing  Goal: Absence of Hospital-Acquired Illness or Injury  Outcome: Ongoing, Progressing  Goal: Optimal Comfort and Wellbeing  Outcome: Ongoing, Progressing  Goal: Readiness for Transition of Care  Outcome: Ongoing, Progressing     Problem: Adjustment to Illness (Sepsis/Septic Shock)  Goal: Optimal Coping  Outcome: Ongoing, Progressing     Problem: Bleeding (Sepsis/Septic Shock)  Goal: Absence of Bleeding  Outcome: Ongoing, Progressing     Problem: Adjustment to Illness (Sepsis/Septic Shock)  Goal: Optimal Coping  Outcome: Ongoing, Progressing     Problem: Bleeding (Sepsis/Septic Shock)  Goal: Absence of Bleeding  Outcome: Ongoing, Progressing     Problem: Glycemic Control Impaired (Sepsis/Septic Shock)  Goal: Blood Glucose Level Within Desired Range  Outcome: Ongoing, Progressing     Problem: Infection Progression (Sepsis/Septic Shock)  Goal: Absence of Infection Signs and Symptoms  Outcome: Ongoing, Progressing     Problem: Nutrition Impaired (Sepsis/Septic Shock)  Goal: Optimal Nutrition Intake  Outcome: Ongoing, Progressing     Problem: Fluid and Electrolyte Imbalance (Acute Kidney Injury/Impairment)  Goal: Fluid and Electrolyte Balance  Outcome: Ongoing, Progressing     Problem: Oral Intake Inadequate (Acute Kidney Injury/Impairment)  Goal: Optimal Nutrition Intake  Outcome: Ongoing, Progressing     Problem: Renal Function Impairment (Acute Kidney Injury/Impairment)  Goal: Effective Renal Function  Outcome: Ongoing, Progressing     Problem: Infection  Goal: Absence of Infection Signs and Symptoms  Outcome: Ongoing, Progressing     Problem: Fall Injury Risk  Goal: Absence of Fall and Fall-Related Injury  Outcome: Ongoing, Progressing     Problem: Communication Impairment (Artificial Airway)  Goal: Effective Communication  Outcome: Ongoing,  Progressing     Problem: Device-Related Complication Risk (Artificial Airway)  Goal: Optimal Device Function  Outcome: Ongoing, Progressing     Problem: Skin and Tissue Injury (Artificial Airway)  Goal: Absence of Device-Related Skin or Tissue Injury  Outcome: Ongoing, Progressing     Problem: Skin Injury Risk Increased  Goal: Skin Health and Integrity  Outcome: Ongoing, Progressing     Problem: Device-Related Complication Risk (Hemodialysis)  Goal: Safe, Effective Therapy Delivery  Outcome: Ongoing, Progressing     Problem: Hemodynamic Instability (Hemodialysis)  Goal: Effective Tissue Perfusion  Outcome: Ongoing, Progressing     Problem: Infection (Hemodialysis)  Goal: Absence of Infection Signs and Symptoms  Outcome: Ongoing, Progressing     Problem: Diabetes Comorbidity  Goal: Blood Glucose Level Within Targeted Range  Outcome: Ongoing, Progressing     Problem: Impaired Wound Healing  Goal: Optimal Wound Healing  Outcome: Ongoing, Progressing     Problem: Gas Exchange Impaired  Goal: Optimal Gas Exchange  Outcome: Ongoing, Progressing     Problem: Breathing Pattern Ineffective  Goal: Effective Breathing Pattern  Outcome: Ongoing, Progressing    No change in pt status, will continue to monitor

## 2022-06-09 NOTE — PROGRESS NOTES
[facility]  Pulmonology  Progress Note    Patient Name: Og Garcia  MRN: [id number]  Admission Date: 12/23/2021  Hospital Length of Stay: 168 days  Code Status: Prior  Attending Provider: Cecil Abernathy DO  Primary Care Provider: Hector Arndt DNP, FNP-C   Principal Problem: Denice gangrene    Subjective:     Interval History: No acute events overnight. He is currently resting comfortably. Oxygenating adequately on trach collar. He is afebrile this am and vital signs are stable.    Objective:     Vital Signs (Most Recent):  Temp: 99.5 °F (37.5 °C) (06/09/22 1115)  Pulse: (!) 111 (06/09/22 1200)  Resp: (!) 37 (06/09/22 1200)  BP: 106/69 (06/09/22 1200)  SpO2: 100 % (06/09/22 1200)   Vital Signs (24h Range):  Temp:  [99.3 °F (37.4 °C)-100 °F (37.8 °C)] 99.5 °F (37.5 °C)  Pulse:  [] 111  Resp:  [19-37] 37  SpO2:  [95 %-100 %] 100 %  BP: ()/(39-69) 106/69     Weight: 73.5 kg (162 lb 0.6 oz)  Body mass index is 22.6 kg/m².      Intake/Output Summary (Last 24 hours) at 6/9/2022 1216  Last data filed at 6/9/2022 0800  Gross per 24 hour   Intake 1710 ml   Output 400 ml   Net 1310 ml         Physical Exam    Vents:  Vent Mode: Standby (05/28/22 0415)  Ventilator Initiated: No (04/23/22 1934)  Set Rate: 0 BPM (05/18/22 0516)  Vt Set: 0 mL (05/18/22 0516)  Pressure Support: 15 cmH20 (05/22/22 0044)  PEEP/CPAP: 5 cmH20 (05/22/22 0044)  Oxygen Concentration (%): 28 (06/09/22 0749)  Peak Airway Pressure: 21 cmH2O (05/22/22 0044)  Plateau Pressure: 20 cmH20 (03/07/22 0500)  Total Ve: 7.8 mL (05/22/22 0044)  F/VT Ratio<105 (RSBI): (!) 22.64 (05/22/22 0044)    Lines/Drains/Airways       Central Venous Catheter Line  Duration                  Hemodialysis Catheter 12/30/21 0900 right internal jugular 161 days              Drain  Duration                  Colostomy 12/18/21 1030 Descending/sigmoid  days         Gastrostomy/Enterostomy 03/09/22 1436 Percutaneous endoscopic  gastrostomy (PEG) midline feeding 91 days              Airway  Duration                  Surgical Airway 04/27/22 0856 Marychuy;Other (Comment) Cuffed;Long 43 days              Peripheral Intravenous Line  Duration                  Peripheral IV - Single Lumen 06/03/22 1527 18 G Anterior;Right Forearm 5 days                    Significant Labs:    CBC/Anemia Profile:  No results for input(s): WBC, HGB, HCT, PLT, MCV, RDW, IRON, FERRITIN, RETIC, FOLATE, [id number], OCCULTBLOOD in the last 48 hours.       Chemistries:  No results for input(s): NA, K, CL, CO2, BUN, CREATININE, CALCIUM, ALBUMIN, PROT, BILITOT, ALKPHOS, ALT, AST, GLUCOSE, MG, PHOS in the last 48 hours.      All pertinent labs within the past 24 hours have been reviewed.    Significant Imaging:  I have reviewed all pertinent imaging results/findings within the past 24 hours.    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 Continue tobramycin and meropenem, both renally dosed, until 7/1  2. 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 Monitor temperature curve and white blood cell count    Going back to OR for debridement today 6/9    Chronic respiratory failure    - has been off vent over a week. Trach downsized to #6 -- will try to cap as tolerated   - capping during the day and removing overnight.   - 24 hour capping in the next day or two.  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

## 2022-06-09 NOTE — PLAN OF CARE
Problem: Communication Impairment (Artificial Airway)  Goal: Effective Communication  Outcome: Ongoing, Progressing     Problem: Skin and Tissue Injury (Artificial Airway)  Goal: Absence of Device-Related Skin or Tissue Injury  Outcome: Ongoing, Progressing     Problem: Skin Injury Risk Increased  Goal: Skin Health and Integrity  Outcome: Ongoing, Progressing     Problem: Gas Exchange Impaired  Goal: Optimal Gas Exchange  Outcome: Ongoing, Progressing

## 2022-06-09 NOTE — ASSESSMENT & PLAN NOTE
- Continue IV antibiotics per ID recommendations - stop date 07/01   - He has been treated for multidrug resistant bacteria     ID note reviewed and appreciated:  1. Continue tobramycin and meropenem, both renally dosed, until 7/1  2. 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 Monitor temperature curve and white blood cell count    Going back to OR for debridement today 6/9

## 2022-06-09 NOTE — PLAN OF CARE
SS spoke with pt's family about updated discharge plans once pt's trach is pulled. They are requesting nursing home in [facility]. SS will make referrals once trach is pulled.

## 2022-06-09 NOTE — SUBJECTIVE & OBJECTIVE
Interval History: No acute events overnight. He is currently resting comfortably. Oxygenating adequately on trach collar. He is afebrile this am and vital signs are stable.    Objective:     Vital Signs (Most Recent):  Temp: 99.5 °F (37.5 °C) (06/09/22 1115)  Pulse: (!) 111 (06/09/22 1200)  Resp: (!) 37 (06/09/22 1200)  BP: 106/69 (06/09/22 1200)  SpO2: 100 % (06/09/22 1200)   Vital Signs (24h Range):  Temp:  [99.3 °F (37.4 °C)-100 °F (37.8 °C)] 99.5 °F (37.5 °C)  Pulse:  [] 111  Resp:  [19-37] 37  SpO2:  [95 %-100 %] 100 %  BP: ()/(39-69) 106/69     Weight: 73.5 kg (162 lb 0.6 oz)  Body mass index is 22.6 kg/m².      Intake/Output Summary (Last 24 hours) at 6/9/2022 1216  Last data filed at 6/9/2022 0800  Gross per 24 hour   Intake 1710 ml   Output 400 ml   Net 1310 ml         Physical Exam    Vents:  Vent Mode: Standby (05/28/22 0415)  Ventilator Initiated: No (04/23/22 1934)  Set Rate: 0 BPM (05/18/22 0516)  Vt Set: 0 mL (05/18/22 0516)  Pressure Support: 15 cmH20 (05/22/22 0044)  PEEP/CPAP: 5 cmH20 (05/22/22 0044)  Oxygen Concentration (%): 28 (06/09/22 0749)  Peak Airway Pressure: 21 cmH2O (05/22/22 0044)  Plateau Pressure: 20 cmH20 (03/07/22 0500)  Total Ve: 7.8 mL (05/22/22 0044)  F/VT Ratio<105 (RSBI): (!) 22.64 (05/22/22 0044)    Lines/Drains/Airways       Central Venous Catheter Line  Duration                  Hemodialysis Catheter 12/30/21 0900 right internal jugular 161 days              Drain  Duration                  Colostomy 12/18/21 1030 Descending/sigmoid  days         Gastrostomy/Enterostomy 03/09/22 1436 Percutaneous endoscopic gastrostomy (PEG) midline feeding 91 days              Airway  Duration                  Surgical Airway 04/27/22 0856 Marychuy;Other (Comment) Cuffed;Long 43 days              Peripheral Intravenous Line  Duration                  Peripheral IV - Single Lumen 06/03/22 1527 18 G Anterior;Right Forearm 5 days                    Significant  Labs:    CBC/Anemia Profile:  No results for input(s): WBC, HGB, HCT, PLT, MCV, RDW, IRON, FERRITIN, RETIC, FOLATE, [id number], OCCULTBLOOD in the last 48 hours.       Chemistries:  No results for input(s): NA, K, CL, CO2, BUN, CREATININE, CALCIUM, ALBUMIN, PROT, BILITOT, ALKPHOS, ALT, AST, GLUCOSE, MG, PHOS in the last 48 hours.      All pertinent labs within the past 24 hours have been reviewed.    Significant Imaging:  I have reviewed all pertinent imaging results/findings within the past 24 hours.

## 2022-06-09 NOTE — ASSESSMENT & PLAN NOTE
6/1/2022  Continue with dialysis as scheduled  6/2/2022  Dialysis as scheduled  6/6/2022 The patient is doing acceptable on dialysis.  6/7/2022  ESRD and is doing well on dialysis.  Volume status is acceptable.  6/8/2022  Continue current dialysis schedule.  6/9/2022  Dialysis schedule continued on [facility]

## 2022-06-09 NOTE — SUBJECTIVE & OBJECTIVE
Interval History: The patient is resting.  He is preparing for further wound debridement today.  No other changes.    Review of patient's allergies indicates:  No Known Allergies  Current Facility-Administered Medications   Medication Frequency    0.9%  NaCl infusion (for blood administration) Q24H PRN    0.9%  NaCl infusion PRN    acetaminophen tablet 500 mg Q6H PRN    albuterol nebulizer solution 2.5 mg Q4H PRN    albuterol-ipratropium 2.5 mg-0.5 mg/3 mL nebulizer solution 3 mL Q6H    bisacodyL EC tablet 10 mg Daily PRN    collagenase ointment Daily PRN    dextrose 50% injection 12.5 g PRN    diltiaZEM tablet 90 mg Q6H    epoetin beth-epbx injection 10,000 Units Every Mon, Wed, Fri    glucagon (human recombinant) injection 1 mg PRN    guaiFENesin 100 mg/5 ml syrup 200 mg Q6H    heparin (porcine) injection 4,000 Units PRN    heparin (porcine) injection 5,000 Units Q8H    insulin aspart U-100 injection 1-10 Units Q6H    insulin detemir U-100 injection 15 Units QHS    linezolid tablet 600 mg Q12H    meropenem (MERREM) 500 mg in sodium chloride 0.9 % 100 mL IVPB (MB+) Q24H    metoprolol tartrate (LOPRESSOR) split tablet 12.5 mg BID    pantoprazole suspension 40 mg Daily    sevelamer carbonate pwpk 0.8 g TID WM    simethicone chewable tablet 80 mg TID PRN    sodium chloride 0.9% bolus 250 mL PRN    tobramycin - pharmacy to dose pharmacy to manage frequency       Objective:     Vital Signs (Most Recent):  Temp: 99.6 °F (37.6 °C) (06/09/22 1500)  Pulse: (!) 114 (06/09/22 1600)  Resp: (!) 30 (06/09/22 1600)  BP: 103/61 (06/09/22 1600)  SpO2: 100 % (06/09/22 1600)  O2 Device (Oxygen Therapy): nasal cannula (06/09/22 0749)   Vital Signs (24h Range):  Temp:  [99.3 °F (37.4 °C)-100 °F (37.8 °C)] 99.6 °F (37.6 °C)  Pulse:  [] 114  Resp:  [19-37] 30  SpO2:  [95 %-100 %] 100 %  BP: ()/(39-76) 103/61     Weight: 73.5 kg (162 lb 0.6 oz) (06/09/22 0600)  Body mass index is 22.6 kg/m².  Body surface area is 1.92 meters  squared.    I/O last 3 completed shifts:  In: 1710 [Other:240; NG/GT:1270; IV Piggyback:200]  Out: 2100 [Other:1500; Stool:600]    Physical Exam  Vitals reviewed.   Constitutional:       Appearance: He is obese.   HENT:      Head: Normocephalic.   Cardiovascular:      Rate and Rhythm: Regular rhythm.   Pulmonary:      Effort: Pulmonary effort is normal.   Abdominal:      Palpations: Abdomen is soft.   Neurological:      Mental Status: He is alert.       Significant Labs:  BMP:   Recent Labs   Lab 06/06/22  0235   GLU 89      K 3.2*      CO2 27   BUN 59*   CREATININE 1.66*   CALCIUM 8.8     CBC:   Recent Labs   Lab 06/06/22  0235   WBC 17.04*   RBC 2.33*   HGB 7.4*   HCT 22.5*   *   MCV 96.6*   MCH 31.8*   MCHC 32.9        Significant Imaging:  Labs: Reviewed

## 2022-06-09 NOTE — NURSING
Notified Dr. Astorga related to patient has surgery in the am, and has orders for heprain 5000 units subq needing clarified. Md writes yes to hold med I the am.

## 2022-06-10 NOTE — TRANSFER OF CARE
Anesthesia Transfer of Care Note    Patient: Og Wolf [place]    Procedure(s) Performed: Procedure(s) (LRB):  DEBRIDEMENT, LOWER EXTREMITY (Bilateral)    Patient location: PACU    Anesthesia Type: general    Transport from OR: Transported from OR on 6-10 L/min O2 by face mask with adequate spontaneous ventilation    Post pain: adequate analgesia    Post assessment: no apparent anesthetic complications    Post vital signs: stable    Level of consciousness: sedated and awake    Nausea/Vomiting: no nausea/vomiting    Complications: none    Transfer of care protocol was followedComments: recoverd on HOW in pt room      Last vitals:   Visit Vitals  BP (!) 115/55   Pulse 88   Temp 36.1 °C (97 °F)   Resp (!) 24   SpO2 100%

## 2022-06-10 NOTE — PLAN OF CARE
Problem: Communication Impairment (Artificial Airway)  Goal: Effective Communication  Outcome: Ongoing, Progressing     Problem: Device-Related Complication Risk (Artificial Airway)  Goal: Optimal Device Function  Outcome: Ongoing, Progressing     Problem: Skin and Tissue Injury (Artificial Airway)  Goal: Absence of Device-Related Skin or Tissue Injury  Outcome: Ongoing, Progressing     Problem: Skin Injury Risk Increased  Goal: Skin Health and Integrity  Outcome: Ongoing, Progressing     Problem: Gas Exchange Impaired  Goal: Optimal Gas Exchange  Outcome: Ongoing, Progressing

## 2022-06-10 NOTE — PLAN OF CARE
Problem: Adjustment to Illness (Sepsis/Septic Shock)  Goal: Optimal Coping  Outcome: Ongoing, Progressing     Problem: Bleeding (Sepsis/Septic Shock)  Goal: Absence of Bleeding  Outcome: Ongoing, Progressing     Problem: Glycemic Control Impaired (Sepsis/Septic Shock)  Goal: Blood Glucose Level Within Desired Range  Outcome: Ongoing, Progressing     Problem: Infection Progression (Sepsis/Septic Shock)  Goal: Absence of Infection Signs and Symptoms  Outcome: Ongoing, Progressing     Problem: Nutrition Impaired (Sepsis/Septic Shock)  Goal: Optimal Nutrition Intake  Outcome: Ongoing, Progressing     Problem: Fluid and Electrolyte Imbalance (Acute Kidney Injury/Impairment)  Goal: Fluid and Electrolyte Balance  Outcome: Ongoing, Progressing     Problem: Oral Intake Inadequate (Acute Kidney Injury/Impairment)  Goal: Optimal Nutrition Intake  Outcome: Ongoing, Progressing     Problem: Infection  Goal: Absence of Infection Signs and Symptoms  Outcome: Ongoing, Progressing     Problem: Fall Injury Risk  Goal: Absence of Fall and Fall-Related Injury  Outcome: Ongoing, Progressing     Problem: Hemodynamic Instability (Hemodialysis)  Goal: Effective Tissue Perfusion  Outcome: Ongoing, Progressing     Problem: Diabetes Comorbidity  Goal: Blood Glucose Level Within Targeted Range  Outcome: Ongoing, Progressing     Problem: Impaired Wound Healing  Goal: Optimal Wound Healing  Outcome: Ongoing, Progressing

## 2022-06-10 NOTE — PROGRESS NOTES
Pharmacokinetic Follow Up: Tobramycin    Assessment of levels:   Today's random tobra level is within the desired range of </= 2    Regimen Plan:   Tobra 220 mg IV x 1 tonight, a random tobra level has been ordered for 6/13 at 0600     Drug levels (last 3 results):  No results for input(s): AMIKACINPEAK, AMIKACINTROU, AMIKACINRAND, AMIKACIN in the last 72 hours.    No results for input(s): GENTAMICIN, GENTPEAK, GENTTROUGH, GENT10, GENT12, GENT8, GENTRANDOM in the last 72 hours.    Recent Labs   Lab Result Units 06/08/22  0421 06/10/22  0731   Tobramycin, Random µg/mL <0.3 1.8       Pharmacy will continue to monitor.    Please contact pharmacy at extension [phone] with any questions regarding this assessment.    Patient brief summary:  Og Garcia is a 67 y.o. male initiated on aminoglycoside therapy for treatment of bone/joint infection    Drug Allergies:   Review of patient's allergies indicates:  No Known Allergies    Actual Body Weight:   75.5 kg    Adjust Body Weight:   75.4 kg    Ideal Body Weight:  75.3 kg    Renal Function:   Estimated Creatinine Clearance: 41.9 mL/min (A) (based on SCr of 1.82 mg/dL (H)).,     Dialysis Method (if applicable):  intermittent HD    CBC (last 72 hours):  Recent Labs   Lab Result Units 06/10/22  0731   WBC K/uL 13.65*   Hemoglobin g/dL 7.4*   Hematocrit % 24.2*   Platelet Count K/uL 371   Lymphocytes % % 23.8*   Monocytes % % 9.6*   Eosinophils % % 4.5*   Basophils % % 0.4       Metabolic Panel (last 72 hours):  Recent Labs   Lab Result Units 06/10/22  0731   Sodium mmol/L 139   Potassium mmol/L 4.2   Chloride mmol/L 102   CO2 mmol/L 27   Glucose mg/dL 87   BUN mg/dL 45*   Creatinine mg/dL 1.82*       Aminoglycoside Administrations:  aminoglycosides given in last 96 hours                     tobramycin (NEBCIN) 220 mg in dextrose 5 % IVPB (mg) 220 mg New Bag 06/08/22 1706                    Microbiologic Results:  Microbiology Results (last 7 days)       ** No results found for  the last 168 hours. **

## 2022-06-10 NOTE — SUBJECTIVE & OBJECTIVE
Interval History: The patient is now s/p wound debridement on the feet this morning.  No other changes.    Review of patient's allergies indicates:  No Known Allergies  No current facility-administered medications for this encounter.     Facility-Administered Medications Ordered in Other Encounters   Medication Frequency    [MAR Hold - Suspended Admission] 0.9%  NaCl infusion (for blood administration) Q24H PRN    [MAR Hold - Suspended Admission] 0.9%  NaCl infusion PRN    [MAR Hold - [facility]] acetaminophen tablet 500 mg Q6H PRN    [MAR Hold - Suspended Admission] albuterol nebulizer solution 2.5 mg Q4H PRN    [MAR Hold - Suspended Admission] albuterol-ipratropium 2.5 mg-0.5 mg/3 mL nebulizer solution 3 mL Q6H    [MAR Hold - Suspended Admission] bisacodyL EC tablet 10 mg Daily PRN    [MAR Hold - Suspended Admission] collagenase ointment Daily PRN    [MAR Hold - Suspended Admission] dextrose 50% injection 12.5 g PRN    [MAR Hold - Suspended Admission] diltiaZEM tablet 90 mg Q6H    [MAR Hold - Suspended Admission] epoetin beth-epbx injection 10,000 Units Every Mon, Wed, Fri    [MAR Hold - Suspended Admission] glucagon (human recombinant) injection 1 mg PRN    [MAR Hold - Suspended Admission] guaiFENesin 100 mg/5 ml syrup 200 mg Q6H    [MAR Hold - Suspended Admission] heparin (porcine) injection 4,000 Units PRN    [MAR Hold - Suspended Admission] heparin (porcine) injection 5,000 Units Q8H    [MAR Hold - [facility]] insulin aspart U-100 injection 1-10 Units Q6H    [MAR Hold - Suspended Admission] insulin detemir U-100 injection 15 Units QHS    [MAR Hold - Suspended Admission] linezolid tablet 600 mg Q12H    [MAR Hold - Suspended Admission] meropenem (MERREM) 500 mg in sodium chloride 0.9 % 100 mL IVPB (MB+) Q24H    [MAR Hold - Suspended Admission] metoprolol tartrate (LOPRESSOR) split tablet 12.5 mg BID    [MAR Hold - Suspended Admission] pantoprazole suspension 40 mg Daily    [MAR Hold - [facility]  [place]mission] sevelamer carbonate pwpk 0.8 g TID WM    [MAR Hold - Suspended Admission] simethicone chewable tablet 80 mg TID PRN    [MAR Hold - Suspended Admission] sodium chloride 0.9% bolus 250 mL PRN    [MAR Hold - Suspended Admission] tobramycin - pharmacy to dose pharmacy to manage frequency       Objective:     Vital Signs (Most Recent):  Pulse: 101 (06/10/22 1011)  Resp: (!) 34 (06/10/22 1011)  BP: 114/65 (06/10/22 1011)  SpO2: 97 % (06/10/22 1011)   Vital Signs (24h Range):  Temp:  [98.7 °F (37.1 °C)-99.8 °F (37.7 °C)] 98.9 °F (37.2 °C)  Pulse:  [] 101  Resp:  [15-37] 34  SpO2:  [94 %-100 %] 97 %  BP: ()/(50-76) 114/65        There is no height or weight on file to calculate BMI.  There is no height or weight on file to calculate BSA.    I/O last 3 completed shifts:  In: 850 [Other:240; NG/GT:510; IV Piggyback:100]  Out: 475 [Stool:475]    Physical Exam  Vitals reviewed.   HENT:      Head: Normocephalic.   Cardiovascular:      Rate and Rhythm: Regular rhythm.   Pulmonary:      Effort: Pulmonary effort is normal.   Abdominal:      Palpations: Abdomen is soft.   Neurological:      Mental Status: He is alert.       Significant Labs:  BMP:   Recent Labs   Lab 06/10/22  0731   GLU 87      K 4.2      CO2 27   BUN 45*   CREATININE 1.82*   CALCIUM 9.0     CBC:   Recent Labs   Lab 06/10/22  0731   WBC 13.65*   RBC 2.44*   HGB 7.4*   HCT 24.2*      MCV 99.2*   MCH 30.3   MCHC 30.6*        Significant Imaging:  Labs: Reviewed

## 2022-06-10 NOTE — ANESTHESIA PREPROCEDURE EVALUATION
06/10/2022  Og Garcia is a 67 y.o., male.  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    I have reviewed the NPO Status.   I have reviewed the Medications.     Review of Systems  Social:  Non-Smoker, No Alcohol Use    Hematology/Oncology:     Oncology Normal    -- Anemia:   EENT/Dental:EENT/Dental Normal   Cardiovascular:   Hypertension    Pulmonary:  Pulmonary Normal Trach   Renal/:   Chronic Renal Disease    Hepatic/GI:  Hepatic/GI Normal    Musculoskeletal:  Musculoskeletal Normal    Neurological:   CVA    Endocrine:   Diabetes    Dermatological:  Skin Normal Denice Gangrene   Psych:  Psychiatric Normal           Physical Exam    Airway:  Mallampati: II / II  Mouth Opening: Normal  TM Distance: Normal  Neck ROM: Normal ROM  Pre-Existing Airway: Tracheostomy tube    Chest/Lungs:  Rales, Basilar    Heart:  Rate: Normal  Rhythm: Regular Rhythm  Sounds: Normal        Chemistry        Component Value Date/Time     06/10/2022 0731    K 4.2 06/10/2022 0731     06/10/2022 0731    CO2 27 06/10/2022 0731    BUN 45 (H) 06/10/2022 0731    CREATININE 1.82 (H) 06/10/2022 0731    GLU 87 06/10/2022 0731        Component Value Date/Time    CALCIUM 9.0 06/10/2022 0731    ALKPHOS 490 (H) 05/18/2022 1216    AST 34 05/18/2022 1216    ALT 55 05/18/2022 1216    BILITOT 0.2 05/18/2022 1216    ESTGFRAFRICA 15 (L) 01/10/2022 0539    EGFRNONAA 40 (L) 06/10/2022 0731        Lab Results   Component Value Date    WBC 13.65 (H) 06/10/2022    RBC 2.44 (L) 06/10/2022    HGB 7.4 (L) 06/10/2022    MCV 99.2 (H) 06/10/2022    MCH 30.3 06/10/2022    MCHC 30.6 (L) 06/10/2022     RDW 15.3 (H) 06/10/2022     06/10/2022    MPV 8.8 (L) 06/10/2022    LYMPH 23.8 (L) 06/10/2022    LYMPH 3.25 06/10/2022    MONO 9.6 (H) 06/10/2022    EOS 0.61 (H) 06/10/2022    BASO 0.05 06/10/2022     Results for orders placed or performed during the hospital encounter of 12/13/21   EKG 12-lead    Collection Time: 12/17/21  3:46 PM    Narrative    Test Reason : Q24.9,    Vent. Rate : 094 BPM     Atrial Rate : 000 BPM     P-R Int : 140 ms          QRS Dur : 150 ms      QT Int : 394 ms       P-R-T Axes : 070 -53 048 degrees     QTc Int : 454 ms    Sinus rhythm  RBBB with left anterior fascicular block  Inferior infarct - age undetermined  Abnormal ECG    Confirmed by Zachary Mosley DO ([id number]) on 12/22/2021 10:01:35 AM    Referred By: [email or web address]   SELF           Confirmed By:Zachary Mosley DO         Anesthesia Plan  Type of Anesthesia, risks & benefits discussed:    Anesthesia Type: Gen ETT  Intra-op Monitoring Plan: Standard ASA Monitors  Post Op Pain Control Plan: multimodal analgesia  Induction:  IV  Airway Plan: Via Tracheostomy  Informed Consent: Informed consent signed with the Patient and all parties understand the risks and agree with anesthesia plan.  All questions answered. Patient consented to blood products? Yes  ASA Score: 4  Day of Surgery Review of History & Physical: H&P Update referred to the surgeon/provider.    Ready For Surgery From Anesthesia Perspective.     .

## 2022-06-10 NOTE — PROGRESS NOTES
Progress Note                                                           Infectious disease   Admit Date: 12/23/2021    SUBJECTIVE:     Follow-up For:  Denice gangrene    HPI/Interval history:  T-max 99.9° C he has been afebrile for several days now.  No acute issues per his nurse.  Tracheostomy remains capped.    Review of Systems:    unable to obtain as patient nonverbal      OBJECTIVE:     Vital Signs Range (Last 24H):  Temp:  [99.3 °F (37.4 °C)-99.9 °F (37.7 °C)]   Pulse:  []   Resp:  [20-37]   BP: ()/(39-76)   SpO2:  [95 %-100 %]     Physical Exam:  Constitutional:  No acute distress, on trach collar, makes eye contact but does not follow commands  HENT: supple   Head: normocephalic, atraumatic   Eyes: Conjunctivae and pupil normal of the left, enucleated right eye ball, no drainage   Cardiovascular: regular rate and rhythm, S1, S2 normal, no murmur, click, rub or gallop  Pulmonary:  Capped trach, no crepitations or wheezing appreciated  Gastrointestinal:  Colostomy and PEG present, bowel sounds normal; non-tender, no masses or organomegaly appreciated  Muscular/Skeletal: Lower extremities without cyanosis or edema, no clubbing.  Multiple wounds to feet bandaged  Skin: Skin color, texture normal. No rashes, ulcers or new lesions. Skin warm and dry.     Laboratory:  CBC:   Recent Labs   Lab 06/06/22 0235   WBC 17.04*   RBC 2.33*   HGB 7.4*   HCT 22.5*   *   MCV 96.6*   MCH 31.8*   MCHC 32.9     BMP:   Recent Labs   Lab 06/06/22  0235   GLU 89      K 3.2*      CO2 27   BUN 59*   CREATININE 1.66*   CALCIUM 8.8     CMP:   Recent Labs   Lab 06/06/22  0235   GLU 89   CALCIUM 8.8      K 3.2*   CO2 27      BUN 59*   CREATININE 1.66*     Microbiology Results (last 7 days)     ** No results found for the last 168 hours. 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 Osteomyelitis to right 5th digit which was amputated 5/20  2. Osteomyelitis to the distal right fibula  3. Episodic fevers with leukocytosis, likely due to chronically ill state with multiple wounds.  Fever curve down for now.  4. Multiple decubitus ulcers including large wound to sacrum infected recently with multidrug resistant organisms  5. Prolonged hospital stay  6. End-stage renal disease on hemodialysis    PLAN:  1. Continue current antibiotics until 7/1 as there is most likely residual infection  in the right foot following amputation of the 5th toe.  Plus he still has significant leukocytosis.  2. Monitor the CBC on linezolid  3. Monitor temperature curve and white blood cell count

## 2022-06-10 NOTE — PROGRESS NOTES
[facility]  Pulmonology  Progress Note    Patient Name: Og Garcia  MRN: [id number]  Admission Date: 12/23/2021  Hospital Length of Stay: 169 days  Code Status: Prior  Attending Provider: Cecil Abernathy DO  Primary Care Provider: Hector Arndt DNP, FNP-C   Principal Problem: Denice gangrene    Subjective:     Interval History: No acute events overnight. He is currently resting comfortably. Oxygenating adequately on nasal cannula. He is afebrile this am and vital signs are stable. Plan to go to OR today for debridement.    Objective:     Vital Signs (Most Recent):  Temp: 98.9 °F (37.2 °C) (06/10/22 0400)  Pulse: 93 (06/10/22 0800)  Resp: (!) 21 (06/10/22 0800)  BP: (!) 103/53 (06/10/22 0600)  SpO2: 100 % (06/10/22 0800)   Vital Signs (24h Range):  Temp:  [98.7 °F (37.1 °C)-99.8 °F (37.7 °C)] 98.9 °F (37.2 °C)  Pulse:  [] 93  Resp:  [15-37] 21  SpO2:  [95 %-100 %] 100 %  BP: ()/(50-76) 103/53     Weight: 75.5 kg (166 lb 7.2 oz)  Body mass index is 23.21 kg/m².      Intake/Output Summary (Last 24 hours) at 6/10/2022 0819  Last data filed at 6/9/2022 1910  Gross per 24 hour   Intake 150 ml   Output 75 ml   Net 75 ml         Physical Exam  Vitals reviewed.   Constitutional:       General: He is not in acute distress.     Comments: Chronically ill appearing   HENT:      Right Ear: External ear normal.      Left Ear: External ear normal.      Mouth/Throat:      Mouth: Mucous membranes are moist.   Eyes:      Conjunctiva/sclera: Conjunctivae normal.   Neck:      Trachea: Tracheostomy present.   Cardiovascular:      Rate and Rhythm: Normal rate and regular rhythm.   Pulmonary:      Effort: Pulmonary effort is normal.      Breath sounds: Normal breath sounds.   Abdominal:      General: Bowel sounds are normal.      Palpations: Abdomen is soft.   Musculoskeletal:         General: Normal range of motion.      Cervical back: Normal range of motion and neck supple.   Skin:      General: Skin is warm and dry.      Capillary Refill: Capillary refill takes less than 2 seconds.   Neurological:      Mental Status: He is alert. Mental status is at baseline.   Psychiatric:         Mood and Affect: Mood normal.       Vents:  Vent Mode: Standby (05/28/22 0415)  Ventilator Initiated: No (04/23/22 1934)  Set Rate: 0 BPM (05/18/22 0516)  Vt Set: 0 mL (05/18/22 0516)  Pressure Support: 15 cmH20 (05/22/22 0044)  PEEP/CPAP: 5 cmH20 (05/22/22 0044)  Oxygen Concentration (%): 28 (06/10/22 0511)  Peak Airway Pressure: 21 cmH2O (05/22/22 0044)  Plateau Pressure: 20 cmH20 (03/07/22 0500)  Total Ve: 7.8 mL (05/22/22 0044)  F/VT Ratio<105 (RSBI): (!) 22.64 (05/22/22 0044)    Lines/Drains/Airways       Central Venous Catheter Line  Duration                  Hemodialysis Catheter 12/30/21 0900 right internal jugular 161 days              Drain  Duration                  Colostomy 12/18/21 1030 Descending/sigmoid  days         Gastrostomy/Enterostomy 03/09/22 1436 Percutaneous endoscopic gastrostomy (PEG) midline feeding 92 days              Airway  Duration                  Surgical Airway 04/27/22 0856 Marychuy;Other (Comment) Cuffed;Long 43 days              Peripheral Intravenous Line  Duration                  Peripheral IV - Single Lumen 06/03/22 1527 18 G Anterior;Right Forearm 6 days                    Significant Labs:    CBC/Anemia Profile:  Recent Labs   Lab 06/10/22  0731   WBC 13.65*   HGB 7.4*   HCT 24.2*      MCV 99.2*   RDW 15.3*          Chemistries:  Recent Labs   Lab 06/10/22  0731      K 4.2      CO2 27   BUN 45*   CREATININE 1.82*   CALCIUM 9.0         All pertinent labs within the past 24 hours have been reviewed.    Significant Imaging:  I have reviewed all pertinent imaging results/findings within the past 24 hours.    Assessment/Plan:     * Denice gangrene  - Stable   - wound vac     Ischemic ulcer of right foot with necrosis of bone  - Status post amputation  -  debridement of heel today 6/10    Osteomyelitis  - Continue IV antibiotics per ID recommendations - stop date 07/01   - He has been treated for multidrug resistant bacteria     ID note reviewed and appreciated:  1. Continue tobramycin and meropenem, both renally dosed, until 7/1  2. 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 Monitor temperature curve and white blood cell count    Going back to OR for debridement today 6/10    Chronic respiratory failure    - has been off vent over a week. Trach downsized to #6 -- will try to cap as tolerated   - capping during the day and removing overnight.   - 24 hour capping now.  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

## 2022-06-10 NOTE — ASSESSMENT & PLAN NOTE
- has been off vent over a week. Trach downsized to #6 -- will try to cap as tolerated   - capping during the day and removing overnight.   - 24 hour capping now.  - hopefully can get trach removed soon  - oxygenating adequately on nasal cannula

## 2022-06-10 NOTE — SUBJECTIVE & OBJECTIVE
Interval History: No acute events overnight. He is currently resting comfortably. Oxygenating adequately on nasal cannula. He is afebrile this am and vital signs are stable. Plan to go to OR today for debridement.    Objective:     Vital Signs (Most Recent):  Temp: 98.9 °F (37.2 °C) (06/10/22 0400)  Pulse: 93 (06/10/22 0800)  Resp: (!) 21 (06/10/22 0800)  BP: (!) 103/53 (06/10/22 0600)  SpO2: 100 % (06/10/22 0800)   Vital Signs (24h Range):  Temp:  [98.7 °F (37.1 °C)-99.8 °F (37.7 °C)] 98.9 °F (37.2 °C)  Pulse:  [] 93  Resp:  [15-37] 21  SpO2:  [95 %-100 %] 100 %  BP: ()/(50-76) 103/53     Weight: 75.5 kg (166 lb 7.2 oz)  Body mass index is 23.21 kg/m².      Intake/Output Summary (Last 24 hours) at 6/10/2022 0819  Last data filed at 6/9/2022 1910  Gross per 24 hour   Intake 150 ml   Output 75 ml   Net 75 ml         Physical Exam  Vitals reviewed.   Constitutional:       General: He is not in acute distress.     Comments: Chronically ill appearing   HENT:      Right Ear: External ear normal.      Left Ear: External ear normal.      Mouth/Throat:      Mouth: Mucous membranes are moist.   Eyes:      Conjunctiva/sclera: Conjunctivae normal.   Neck:      Trachea: Tracheostomy present.   Cardiovascular:      Rate and Rhythm: Normal rate and regular rhythm.   Pulmonary:      Effort: Pulmonary effort is normal.      Breath sounds: Normal breath sounds.   Abdominal:      General: Bowel sounds are normal.      Palpations: Abdomen is soft.   Musculoskeletal:         General: Normal range of motion.      Cervical back: Normal range of motion and neck supple.   Skin:     General: Skin is warm and dry.      Capillary Refill: Capillary refill takes less than 2 seconds.   Neurological:      Mental Status: He is alert. Mental status is at baseline.   Psychiatric:         Mood and Affect: Mood normal.       Vents:  Vent Mode: Standby (05/28/22 0415)  Ventilator Initiated: No (04/23/22 1934)  Set Rate: 0 BPM (05/18/22  0516)  Vt Set: 0 mL (05/18/22 0516)  Pressure Support: 15 cmH20 (05/22/22 0044)  PEEP/CPAP: 5 cmH20 (05/22/22 0044)  Oxygen Concentration (%): 28 (06/10/22 0511)  Peak Airway Pressure: 21 cmH2O (05/22/22 0044)  Plateau Pressure: 20 cmH20 (03/07/22 0500)  Total Ve: 7.8 mL (05/22/22 0044)  F/VT Ratio<105 (RSBI): (!) 22.64 (05/22/22 0044)    Lines/Drains/Airways       Central Venous Catheter Line  Duration                  Hemodialysis Catheter 12/30/21 0900 right internal jugular 161 days              Drain  Duration                  Colostomy 12/18/21 1030 Descending/sigmoid  days         Gastrostomy/Enterostomy 03/09/22 1436 Percutaneous endoscopic gastrostomy (PEG) midline feeding 92 days              Airway  Duration                  Surgical Airway 04/27/22 0856 Shiley;Other (Comment) Cuffed;Long 43 days              Peripheral Intravenous Line  Duration                  Peripheral IV - Single Lumen 06/03/22 1527 18 G Anterior;Right Forearm 6 days                    Significant Labs:    CBC/Anemia Profile:  Recent Labs   Lab 06/10/22  0731   WBC 13.65*   HGB 7.4*   HCT 24.2*      MCV 99.2*   RDW 15.3*          Chemistries:  Recent Labs   Lab 06/10/22  0731      K 4.2      CO2 27   BUN 45*   CREATININE 1.82*   CALCIUM 9.0         All pertinent labs within the past 24 hours have been reviewed.    Significant Imaging:  I have reviewed all pertinent imaging results/findings within the past 24 hours.

## 2022-06-10 NOTE — ANESTHESIA POSTPROCEDURE EVALUATION
Anesthesia Post Evaluation    Patient: Og [facility]    Procedure(s) Performed: Procedure(s) (LRB):  DEBRIDEMENT, LOWER EXTREMITY (Bilateral)    Final Anesthesia Type: general      Patient location during evaluation: ICU  Post-procedure vital signs: reviewed and stable  Pain management: adequate  Airway patency: patent    PONV status at discharge: No PONV  Anesthetic complications: no      Cardiovascular status: hemodynamically stable  Respiratory status: Tracheostomy  Hydration status: euvolemic  Follow-up not needed.          Vitals Value Taken Time   /61 06/10/22 1300   Temp 36.1 °C (97 °F) 06/10/22 1200   Pulse 95 06/10/22 1300   Resp 29 06/10/22 1300   SpO2 100 % 06/10/22 1300         Event Time   Out of Recovery 10:36:00         Pain/Rio Score: Rio Score: 8 (6/10/2022 10:36 AM)

## 2022-06-10 NOTE — ASSESSMENT & PLAN NOTE
- Continue IV antibiotics per ID recommendations - stop date 07/01   - He has been treated for multidrug resistant bacteria     ID note reviewed and appreciated:  1. Continue tobramycin and meropenem, both renally dosed, until 7/1  2. 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 Monitor temperature curve and white blood cell count    Going back to OR for debridement today 6/10

## 2022-06-11 NOTE — SUBJECTIVE & OBJECTIVE
Interval History:  Patient without complaints    Objective:     Vital Signs (Most Recent):  Temp: (!) 101.2 °F (38.4 °C) (06/11/22 0000)  Pulse: 90 (06/11/22 0215)  Resp: (!) 33 (06/11/22 0215)  BP: (!) 77/41 (06/11/22 0215)  SpO2: 100 % (06/11/22 0215)   Vital Signs (24h Range):  Temp:  [97 °F (36.1 °C)-101.2 °F (38.4 °C)] 101.2 °F (38.4 °C)  Pulse:  [] 90  Resp:  [11-41] 33  SpO2:  [90 %-100 %] 100 %  BP: ()/(41-74) 77/41     Weight: 75.5 kg (166 lb 7.2 oz)  Body mass index is 23.21 kg/m².      Intake/Output Summary (Last 24 hours) at 6/11/2022 0538  Last data filed at 6/10/2022 1419  Gross per 24 hour   Intake --   Output 1500 ml   Net -1500 ml       Physical Exam  Vitals reviewed.   Constitutional:       Appearance: Normal appearance.      Interventions: He is not intubated.  HENT:      Head: Normocephalic and atraumatic.      Nose: Nose normal.      Mouth/Throat:      Mouth: Mucous membranes are dry.      Pharynx: Oropharynx is clear.   Eyes:      Extraocular Movements: Extraocular movements intact.      Conjunctiva/sclera: Conjunctivae normal.      Pupils: Pupils are equal, round, and reactive to light.   Cardiovascular:      Rate and Rhythm: Normal rate.      Heart sounds: Normal heart sounds. No murmur heard.  Pulmonary:      Effort: Pulmonary effort is normal. He is not intubated.      Breath sounds: Normal breath sounds.   Abdominal:      General: Abdomen is flat. Bowel sounds are normal.      Palpations: Abdomen is soft.   Musculoskeletal:         General: Normal range of motion.      Cervical back: Normal range of motion and neck supple.      Right lower leg: No edema.      Left lower leg: No edema.   Skin:     General: Skin is warm and dry.      Capillary Refill: Capillary refill takes less than 2 seconds.   Neurological:      General: No focal deficit present.      Mental Status: He is alert and oriented to person, place, and time.   Psychiatric:         Mood and Affect: Mood normal.          Behavior: Behavior normal.       Vents:  Vent Mode: Standby (05/28/22 0415)  Ventilator Initiated: No (04/23/22 1934)  Set Rate: 0 BPM (05/18/22 0516)  Vt Set: 0 mL (05/18/22 0516)  Pressure Support: 15 cmH20 (05/22/22 0044)  PEEP/CPAP: 5 cmH20 (05/22/22 0044)  Oxygen Concentration (%): 28 (06/10/22 1400)  Peak Airway Pressure: 21 cmH2O (05/22/22 0044)  Plateau Pressure: 20 cmH20 (03/07/22 0500)  Total Ve: 7.8 mL (05/22/22 0044)  F/VT Ratio<105 (RSBI): (!) 22.64 (05/22/22 0044)    Lines/Drains/Airways       Central Venous Catheter Line  Duration                  Hemodialysis Catheter 12/30/21 0900 right internal jugular 162 days              Drain  Duration                  Colostomy 12/18/21 1030 Descending/sigmoid  days         Gastrostomy/Enterostomy 03/09/22 1436 Percutaneous endoscopic gastrostomy (PEG) midline feeding 93 days              Airway  Duration                  Surgical Airway 04/27/22 0856 Shiley;Other (Comment) Cuffed;Long 44 days              Peripheral Intravenous Line  Duration                  Peripheral IV - Single Lumen 06/03/22 1527 18 G Anterior;Right Forearm 7 days                    Significant Labs:    CBC/Anemia Profile:  Recent Labs   Lab 06/10/22  0731   WBC 13.65*   HGB 7.4*   HCT 24.2*      MCV 99.2*   RDW 15.3*        Chemistries:  Recent Labs   Lab 06/10/22  0731      K 4.2      CO2 27   BUN 45*   CREATININE 1.82*   CALCIUM 9.0       Recent Lab Results  (Last 5 results in the past 24 hours)        06/11/22  0117   06/10/22  2119   06/10/22  1735   06/10/22  1250   06/10/22  0731        Anion Gap         14       Baso #         0.05       Basophil %         0.4       BUN         45       BUN/CREAT RATIO         25       Calcium         9.0       Chloride         102       CO2         27       Creatinine         1.82       Differential Type         Auto       eGFR if non          40       Eos #         0.61       Eosinophil %         4.5        Glucose         87       Hematocrit         24.2       Hemoglobin         7.4       Immature Grans (Abs)         0.19       Immature Granulocytes         1.4       Lymph #         3.25       Lymph %         23.8       MCH         30.3       MCHC         30.6       MCV         99.2       Mono #         1.31       Mono %         9.6       MPV         8.8       Neutrophils, Abs         8.24       Neutrophils Relative         60.3       nRBC         0.0       NUCLEATED RBC ABSOLUTE         0.00       Platelets         371       POC Glucose 109   127   82   94         Potassium         4.2       RBC         2.44       RDW         15.3       Sodium         139       Tobramycin, Random         1.8       WBC         13.65                              Significant Imaging:  I have reviewed all pertinent imaging results/findings within the past 24 hours.

## 2022-06-11 NOTE — PROGRESS NOTES
Patient is resting comfortably.     Physical exam general patient is chronically ill-appearing, heart is regular rate and rhythm, he has no pitting edema, lungs are clear to auscultation anteriorly, abdomen is soft with positive bowel sounds    Assessment/plan 1.  ESRD-continue HD support  2. Sacral wound-continue wound care   3.  Anemia-patient's hematocrit is 24 % this morning  4. Diabetes mellitus-continue present hypoglycemic regimen  5. Debility  6. Respiratory failure continue trach care   7. Secondary hyperparathyroidism-continue Renvela  8. Hypertension-continue diltiazem

## 2022-06-11 NOTE — PROGRESS NOTES
[facility]  Pulmonology  Progress Note    Patient Name: Og Garcia  MRN: [id number]  Admission Date: 12/23/2021  Hospital Length of Stay: 170 days  Code Status: Prior  Attending Provider: Cecil Abernathy DO  Primary Care Provider: Hector Arndt DNP, FNP-C   Principal Problem: Denice gangrene    Subjective:     Interval History:  Patient without complaints    Objective:     Vital Signs (Most Recent):  Temp: (!) 101.2 °F (38.4 °C) (06/11/22 0000)  Pulse: 90 (06/11/22 0215)  Resp: (!) 33 (06/11/22 0215)  BP: (!) 77/41 (06/11/22 0215)  SpO2: 100 % (06/11/22 0215)   Vital Signs (24h Range):  Temp:  [97 °F (36.1 °C)-101.2 °F (38.4 °C)] 101.2 °F (38.4 °C)  Pulse:  [] 90  Resp:  [11-41] 33  SpO2:  [90 %-100 %] 100 %  BP: ()/(41-74) 77/41     Weight: 75.5 kg (166 lb 7.2 oz)  Body mass index is 23.21 kg/m².      Intake/Output Summary (Last 24 hours) at 6/11/2022 0538  Last data filed at 6/10/2022 1419  Gross per 24 hour   Intake --   Output 1500 ml   Net -1500 ml       Physical Exam  Vitals reviewed.   Constitutional:       Appearance: Normal appearance.      Interventions: He is not intubated.  HENT:      Head: Normocephalic and atraumatic.      Nose: Nose normal.      Mouth/Throat:      Mouth: Mucous membranes are dry.      Pharynx: Oropharynx is clear.   Eyes:      Extraocular Movements: Extraocular movements intact.      Conjunctiva/sclera: Conjunctivae normal.      Pupils: Pupils are equal, round, and reactive to light.   Cardiovascular:      Rate and Rhythm: Normal rate.      Heart sounds: Normal heart sounds. No murmur heard.  Pulmonary:      Effort: Pulmonary effort is normal. He is not intubated.      Breath sounds: Normal breath sounds.   Abdominal:      General: Abdomen is flat. Bowel sounds are normal.      Palpations: Abdomen is soft.   Musculoskeletal:         General: Normal range of motion.      Cervical back: Normal range of motion and neck supple.      Right  lower leg: No edema.      Left lower leg: No edema.   Skin:     General: Skin is warm and dry.      Capillary Refill: Capillary refill takes less than 2 seconds.   Neurological:      General: No focal deficit present.      Mental Status: He is alert and oriented to person, place, and time.   Psychiatric:         Mood and Affect: Mood normal.         Behavior: Behavior normal.       Vents:  Vent Mode: Standby (05/28/22 0415)  Ventilator Initiated: No (04/23/22 1934)  Set Rate: 0 BPM (05/18/22 0516)  Vt Set: 0 mL (05/18/22 0516)  Pressure Support: 15 cmH20 (05/22/22 0044)  PEEP/CPAP: 5 cmH20 (05/22/22 0044)  Oxygen Concentration (%): 28 (06/10/22 1400)  Peak Airway Pressure: 21 cmH2O (05/22/22 0044)  Plateau Pressure: 20 cmH20 (03/07/22 0500)  Total Ve: 7.8 mL (05/22/22 0044)  F/VT Ratio<105 (RSBI): (!) 22.64 (05/22/22 0044)    Lines/Drains/Airways       Central Venous Catheter Line  Duration                  Hemodialysis Catheter 12/30/21 0900 right internal jugular 162 days              Drain  Duration                  Colostomy 12/18/21 1030 Descending/sigmoid  days         Gastrostomy/Enterostomy 03/09/22 1436 Percutaneous endoscopic gastrostomy (PEG) midline feeding 93 days              Airway  Duration                  Surgical Airway 04/27/22 0856 Shiley;Other (Comment) Cuffed;Long 44 days              Peripheral Intravenous Line  Duration                  Peripheral IV - Single Lumen 06/03/22 1527 18 G Anterior;Right Forearm 7 days                    Significant Labs:    CBC/Anemia Profile:  Recent Labs   Lab 06/10/22  0731   WBC 13.65*   HGB 7.4*   HCT 24.2*      MCV 99.2*   RDW 15.3*        Chemistries:  Recent Labs   Lab 06/10/22  0731      K 4.2      CO2 27   BUN 45*   CREATININE 1.82*   CALCIUM 9.0       Recent Lab Results  (Last 5 results in the past 24 hours)        06/11/22  0117   06/10/22  2119   06/10/22  1735   06/10/22  1250   06/10/22  0731        Anion Gap         14        Baso #         0.05       Basophil %         0.4       BUN         45       BUN/CREAT RATIO         25       Calcium         9.0       Chloride         102       CO2         27       Creatinine         1.82       Differential Type         Auto       eGFR if non          40       Eos #         0.61       Eosinophil %         4.5       Glucose         87       Hematocrit         24.2       Hemoglobin         7.4       Immature Grans (Abs)         0.19       Immature Granulocytes         1.4       Lymph #         3.25       Lymph %         23.8       MCH         30.3       MCHC         30.6       MCV         99.2       Mono #         1.31       Mono %         9.6       MPV         8.8       Neutrophils, Abs         8.24       Neutrophils Relative         60.3       nRBC         0.0       NUCLEATED RBC ABSOLUTE         0.00       Platelets         371       POC Glucose 109   127   82   94         Potassium         4.2       RBC         2.44       RDW         15.3       Sodium         139       Tobramycin, Random         1.8       WBC         13.65                              Significant Imaging:  I have reviewed all pertinent imaging results/findings within the past 24 hours.    Assessment/Plan:     * Denice gangrene  - Stable   - wound vac     Chronic respiratory failure    - has been off vent over a week. Trach downsized to #6 -- will try to cap as tolerated   - capping during the day and removing overnight.   - 24 hour capping now.  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

## 2022-06-11 NOTE — PLAN OF CARE
Problem: Adjustment to Illness (Sepsis/Septic Shock)  Goal: Optimal Coping  6/10/2022 1917 by Sandra uF RN  Outcome: Ongoing, Progressing  6/10/2022 0622 by Sandra Fu RN  Outcome: Ongoing, Progressing     Problem: Bleeding (Sepsis/Septic Shock)  Goal: Absence of Bleeding  6/10/2022 1917 by Sandra Fu RN  Outcome: Ongoing, Progressing  6/10/2022 0622 by Sandra Fu RN  Outcome: Ongoing, Progressing     Problem: Glycemic Control Impaired (Sepsis/Septic Shock)  Goal: Blood Glucose Level Within Desired Range  6/10/2022 1917 by Sandra Fu RN  Outcome: Ongoing, Progressing  6/10/2022 0622 by Sandra Fu RN  Outcome: Ongoing, Progressing     Problem: Infection Progression (Sepsis/Septic Shock)  Goal: Absence of Infection Signs and Symptoms  6/10/2022 1917 by Sandra Fu RN  Outcome: Ongoing, Progressing  6/10/2022 0622 by Sandra Fu RN  Outcome: Ongoing, Progressing     Problem: Nutrition Impaired (Sepsis/Septic Shock)  Goal: Optimal Nutrition Intake  6/10/2022 1917 by Sandra Fu RN  Outcome: Ongoing, Progressing  6/10/2022 0622 by Sandra Fu RN  Outcome: Ongoing, Progressing     Problem: Fluid and Electrolyte Imbalance (Acute Kidney Injury/Impairment)  Goal: Fluid and Electrolyte Balance  6/10/2022 1917 by Sandra Fu RN  Outcome: Ongoing, Progressing  6/10/2022 0622 by Sandra Fu RN  Outcome: Ongoing, Progressing     Problem: Fall Injury Risk  Goal: Absence of Fall and Fall-Related Injury  6/10/2022 1917 by Sandra Fu RN  Outcome: Ongoing, Progressing  6/10/2022 0622 by Sandra Fu RN  Outcome: Ongoing, Progressing     Problem: Skin and Tissue Injury (Artificial Airway)  Goal: Absence of Device-Related Skin or Tissue Injury  6/10/2022 1917 by Sandra Fu RN  Outcome: Ongoing, Progressing  6/10/2022 0622 by Sandra Fu RN  Outcome: Ongoing, Progressing     Problem: Breathing Pattern Ineffective  Goal: Effective Breathing  Pattern  6/10/2022 1917 by Sandra Fu, RN  Outcome: Ongoing, Progressing  6/10/2022 0622 by Sandra Fu, RN  Outcome: Ongoing, Progressing

## 2022-06-12 NOTE — PROGRESS NOTES
[facility]  Pulmonology  Progress Note    Patient Name: Og Garcia  MRN: [id number]  Admission Date: 12/23/2021  Hospital Length of Stay: 171 days  Code Status: Prior  Attending Provider: Cecil Abernathy DO  Primary Care Provider: Hector Arndt DNP, FNP-C   Principal Problem: Denice gangrene    Subjective:     Interval History:  Patient without complaints    Objective:     Vital Signs (Most Recent):  Temp: (!) 100.9 °F (38.3 °C) (06/12/22 0345)  Pulse: 106 (06/12/22 0500)  Resp: (!) 29 (06/12/22 0500)  BP: (!) 120/42 (06/12/22 0519)  SpO2: 100 % (06/12/22 0500)   Vital Signs (24h Range):  Temp:  [99.8 °F (37.7 °C)-101.8 °F (38.8 °C)] 100.9 °F (38.3 °C)  Pulse:  [] 106  Resp:  [22-39] 29  SpO2:  [93 %-100 %] 100 %  BP: ()/(30-60) 120/42     Weight: 75 kg (165 lb 5.5 oz)  Body mass index is 23.06 kg/m².      Intake/Output Summary (Last 24 hours) at 6/12/2022 0535  Last data filed at 6/12/2022 0000  Gross per 24 hour   Intake 830 ml   Output 200 ml   Net 630 ml       Physical Exam  Vitals reviewed.   Constitutional:       Appearance: Normal appearance.      Interventions: He is not intubated.  HENT:      Head: Normocephalic and atraumatic.      Nose: Nose normal.      Mouth/Throat:      Mouth: Mucous membranes are dry.      Pharynx: Oropharynx is clear.   Eyes:      Extraocular Movements: Extraocular movements intact.      Conjunctiva/sclera: Conjunctivae normal.      Pupils: Pupils are equal, round, and reactive to light.   Cardiovascular:      Rate and Rhythm: Normal rate.      Heart sounds: Normal heart sounds. No murmur heard.  Pulmonary:      Effort: Pulmonary effort is normal. He is not intubated.      Breath sounds: Normal breath sounds.   Abdominal:      General: Abdomen is flat. Bowel sounds are normal.      Palpations: Abdomen is soft.   Musculoskeletal:         General: Normal range of motion.      Cervical back: Normal range of motion and neck supple.       Right lower leg: No edema.      Left lower leg: No edema.   Skin:     General: Skin is warm and dry.      Capillary Refill: Capillary refill takes less than 2 seconds.   Neurological:      General: No focal deficit present.      Mental Status: He is alert and oriented to person, place, and time.   Psychiatric:         Mood and Affect: Mood normal.         Behavior: Behavior normal.       Vents:  Vent Mode: Standby (05/28/22 0415)  Ventilator Initiated: No (04/23/22 1934)  Set Rate: 0 BPM (05/18/22 0516)  Vt Set: 0 mL (05/18/22 0516)  Pressure Support: 15 cmH20 (05/22/22 0044)  PEEP/CPAP: 5 cmH20 (05/22/22 0044)  Oxygen Concentration (%): 28 (06/11/22 1217)  Peak Airway Pressure: 21 cmH2O (05/22/22 0044)  Plateau Pressure: 20 cmH20 (03/07/22 0500)  Total Ve: 7.8 mL (05/22/22 0044)  F/VT Ratio<105 (RSBI): (!) 22.64 (05/22/22 0044)    Lines/Drains/Airways       Central Venous Catheter Line  Duration                  Hemodialysis Catheter 12/30/21 0900 right internal jugular 163 days              Drain  Duration                  Colostomy 12/18/21 1030 Descending/sigmoid  days         Gastrostomy/Enterostomy 03/09/22 1436 Percutaneous endoscopic gastrostomy (PEG) midline feeding 94 days              Airway  Duration                  Surgical Airway 04/27/22 0856 Shiley;Other (Comment) Cuffed;Long 45 days              Peripheral Intravenous Line  Duration                  Peripheral IV - Single Lumen 06/03/22 1527 18 G Anterior;Right Forearm 8 days                    Significant Labs:    CBC/Anemia Profile:  Recent Labs   Lab 06/10/22  0731   WBC 13.65*   HGB 7.4*   HCT 24.2*      MCV 99.2*   RDW 15.3*        Chemistries:  Recent Labs   Lab 06/10/22  0731      K 4.2      CO2 27   BUN 45*   CREATININE 1.82*   CALCIUM 9.0       Recent Lab Results  (Last 5 results in the past 24 hours)        06/12/22  0519   06/11/22  2345   06/11/22  2112   06/11/22  1820   06/11/22  1139        POC Glucose 103    118   113   126   118                              Significant Imaging:  I have reviewed all pertinent imaging results/findings within the past 24 hours.    Assessment/Plan:     * Denice gangrene  - Stable   - wound vac     Chronic respiratory failure    Patient looks comfortable with tracheostomy capped, may try to pull trach this week    Hypertension  Blood pressure looks okay continue medicines          ESRD (end stage renal disease)  - Started on HD 12/30   - Continue with HD per nephrology  - Now on epogen                 Jose Urban MD  Pulmonology  [facility]

## 2022-06-12 NOTE — PROGRESS NOTES
Patient is resting comfortably.  His trach tube is capped    Physical exam general patient is chronically ill-appearing, heart is regular rate and rhythm, he has no pitting edema, lungs are clear to auscultation anteriorly, abdomen is soft with positive bowel sounds    Assessment/plan 1.  ESRD-continue HD support  2. Sacral wound-continue wound care   3.  Anemia-patient's hematocrit is 24 % this morning  4. Diabetes mellitus-continue present hypoglycemic regimen, patient's glucose level 140  5. Debility  6. Respiratory failure continue trach care , presently the patient is undergoing a weaning trial is trach is capped, he is doing well as O2 sat is at a good level his respiratory rate is had a comfortable rate for him.  7. Secondary hyperparathyroidism-continue Renvela  8. Hypertension-continue diltiazem

## 2022-06-12 NOTE — SUBJECTIVE & OBJECTIVE
Interval History:  Patient without complaints    Objective:     Vital Signs (Most Recent):  Temp: (!) 100.9 °F (38.3 °C) (06/12/22 0345)  Pulse: 106 (06/12/22 0500)  Resp: (!) 29 (06/12/22 0500)  BP: (!) 120/42 (06/12/22 0519)  SpO2: 100 % (06/12/22 0500)   Vital Signs (24h Range):  Temp:  [99.8 °F (37.7 °C)-101.8 °F (38.8 °C)] 100.9 °F (38.3 °C)  Pulse:  [] 106  Resp:  [22-39] 29  SpO2:  [93 %-100 %] 100 %  BP: ()/(30-60) 120/42     Weight: 75 kg (165 lb 5.5 oz)  Body mass index is 23.06 kg/m².      Intake/Output Summary (Last 24 hours) at 6/12/2022 0535  Last data filed at 6/12/2022 0000  Gross per 24 hour   Intake 830 ml   Output 200 ml   Net 630 ml       Physical Exam  Vitals reviewed.   Constitutional:       Appearance: Normal appearance.      Interventions: He is not intubated.  HENT:      Head: Normocephalic and atraumatic.      Nose: Nose normal.      Mouth/Throat:      Mouth: Mucous membranes are dry.      Pharynx: Oropharynx is clear.   Eyes:      Extraocular Movements: Extraocular movements intact.      Conjunctiva/sclera: Conjunctivae normal.      Pupils: Pupils are equal, round, and reactive to light.   Cardiovascular:      Rate and Rhythm: Normal rate.      Heart sounds: Normal heart sounds. No murmur heard.  Pulmonary:      Effort: Pulmonary effort is normal. He is not intubated.      Breath sounds: Normal breath sounds.   Abdominal:      General: Abdomen is flat. Bowel sounds are normal.      Palpations: Abdomen is soft.   Musculoskeletal:         General: Normal range of motion.      Cervical back: Normal range of motion and neck supple.      Right lower leg: No edema.      Left lower leg: No edema.   Skin:     General: Skin is warm and dry.      Capillary Refill: Capillary refill takes less than 2 seconds.   Neurological:      General: No focal deficit present.      Mental Status: He is alert and oriented to person, place, and time.   Psychiatric:         Mood and Affect: Mood normal.          Behavior: Behavior normal.       Vents:  Vent Mode: Standby (05/28/22 0415)  Ventilator Initiated: No (04/23/22 1934)  Set Rate: 0 BPM (05/18/22 0516)  Vt Set: 0 mL (05/18/22 0516)  Pressure Support: 15 cmH20 (05/22/22 0044)  PEEP/CPAP: 5 cmH20 (05/22/22 0044)  Oxygen Concentration (%): 28 (06/11/22 1217)  Peak Airway Pressure: 21 cmH2O (05/22/22 0044)  Plateau Pressure: 20 cmH20 (03/07/22 0500)  Total Ve: 7.8 mL (05/22/22 0044)  F/VT Ratio<105 (RSBI): (!) 22.64 (05/22/22 0044)    Lines/Drains/Airways       Central Venous Catheter Line  Duration                  Hemodialysis Catheter 12/30/21 0900 right internal jugular 163 days              Drain  Duration                  Colostomy 12/18/21 1030 Descending/sigmoid  days         Gastrostomy/Enterostomy 03/09/22 1436 Percutaneous endoscopic gastrostomy (PEG) midline feeding 94 days              Airway  Duration                  Surgical Airway 04/27/22 0856 Shiley;Other (Comment) Cuffed;Long 45 days              Peripheral Intravenous Line  Duration                  Peripheral IV - Single Lumen 06/03/22 1527 18 G Anterior;Right Forearm 8 days                    Significant Labs:    CBC/Anemia Profile:  Recent Labs   Lab 06/10/22  0731   WBC 13.65*   HGB 7.4*   HCT 24.2*      MCV 99.2*   RDW 15.3*        Chemistries:  Recent Labs   Lab 06/10/22  0731      K 4.2      CO2 27   BUN 45*   CREATININE 1.82*   CALCIUM 9.0       Recent Lab Results  (Last 5 results in the past 24 hours)        06/12/22  0519   06/11/22  2345   06/11/22  2112   06/11/22  1820   06/11/22  1139        POC Glucose 103   118   113   126   118                              Significant Imaging:  I have reviewed all pertinent imaging results/findings within the past 24 hours.

## 2022-06-12 NOTE — PROGRESS NOTES
Progress Note                                                           Infectious disease   Admit Date: 12/23/2021    SUBJECTIVE:     Follow-up For:  Denice gangrene    HPI/Interval history:  Temperature curve rising , T-max 101.2°.  Remains off the vent with capped tracheostomy.    Review of Systems:    unable to obtain as patient nonverbal      OBJECTIVE:     Vital Signs Range (Last 24H):  Temp:  [99.8 °F (37.7 °C)-101.8 °F (38.8 °C)]   Pulse:  []   Resp:  [22-39]   BP: ()/(30-60)   SpO2:  [93 %-100 %]     Physical Exam:  Constitutional:  No acute distress but looks chronically ill,  does not follow commands  HENT: supple   Head: normocephalic, atraumatic   Eyes: Conjunctivae and pupil normal of the left, enucleated right eye ball, no drainage   Cardiovascular: regular rate and rhythm, S1, S2 normal, no murmur, click, rub or gallop  Pulmonary:  Capped trach, no crepitations or wheezing appreciated  Gastrointestinal:  Colostomy and PEG present, bowel sounds normal; non-tender, no masses or organomegaly appreciated  Muscular/Skeletal: Lower extremities without cyanosis or edema, no clubbing.  Multiple wounds to feet bandaged  Skin: Skin color, texture normal. No rashes, ulcers or new lesions. Skin warm and dry.     Laboratory:  CBC:   Recent Labs   Lab 06/10/22  0731   WBC 13.65*   RBC 2.44*   HGB 7.4*   HCT 24.2*      MCV 99.2*   MCH 30.3   MCHC 30.6*     BMP:   Recent Labs   Lab 06/10/22  0731   GLU 87      K 4.2      CO2 27   BUN 45*   CREATININE 1.82*   CALCIUM 9.0     CMP:   Recent Labs   Lab 06/10/22  0731   GLU 87   CALCIUM 9.0      K 4.2   CO2 27      BUN 45*   CREATININE 1.82*     Microbiology Results (last 7 days)     Procedure Component Value Units Date/Time    Blood culture [[id number]]     Order Status: Sent Specimen: Blood           Diagnostic Results:  Labs: Reviewed    ASSESSMENT/PLAN:     Active Hospital Problems    Diagnosis  POA    *Denice gangrene  [N49.3]  Yes    Osteomyelitis [M86.9]  Clinically Undetermined    Ischemic ulcer of right foot with necrosis of bone [L97.514]  No    Ulcer of right lower extremity with necrosis of bone [L97.914]  No    Tachycardia [R00.0]  No    Chronic respiratory failure [J96.10]  Yes    Pressure ulcer of sacral region, stage 4 [L89.154]  Yes    Fever [R50.9]  No    ESRD (end stage renal disease) [N18.6]  Yes     Continue with scheduled hemodialysis.     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 Osteomyelitis to right 5th digit which was amputated 5/20  2. Osteomyelitis to the distal right fibula  3. Episodic fevers with leukocytosis, temperature higher today but the leukocytosis is actually improving.   4. Multiple decubitus ulcers including large wound to sacrum infected recently with multidrug resistant organisms  5. Prolonged hospital stay  6. End-stage renal disease on hemodialysis    PLAN:  1. Repeat blood cultures x2 sets  2. Continue current antibiotics until 7/1  (meropenem, tobramycin, and linezolid)  2. Monitor the CBC on linezolid  3. Monitor temperature curve and white blood cell count.  If you worsening, add empiric micafungin pending finalization of the cultures.

## 2022-06-13 NOTE — PLAN OF CARE
Problem: Adjustment to Illness (Sepsis/Septic Shock)  Goal: Optimal Coping  Outcome: Ongoing, Progressing  Intervention: Optimize Psychosocial Adjustment to Illness  Flowsheets (Taken 6/13/2022 1246)  Supportive Measures:   positive reinforcement provided   goal-setting facilitated  Family/Support System Care: (spoke with pt [email or web address])   involvement promoted   other (see comments)

## 2022-06-13 NOTE — SUBJECTIVE & OBJECTIVE
Interval History:  Patient without complaints    Objective:     Vital Signs (Most Recent):  Temp: (!) 101.3 °F (38.5 °C) (06/13/22 0417)  Pulse: 100 (06/13/22 0400)  Resp: (!) 26 (06/13/22 0400)  BP: (!) 96/45 (06/13/22 0522)  SpO2: 100 % (06/13/22 0400)   Vital Signs (24h Range):  Temp:  [99.3 °F (37.4 °C)-101.4 °F (38.6 °C)] 101.3 °F (38.5 °C)  Pulse:  [] 100  Resp:  [18-40] 26  SpO2:  [97 %-100 %] 100 %  BP: ()/(40-84) 96/45     Weight: 75.5 kg (166 lb 7.2 oz)  Body mass index is 23.21 kg/m².      Intake/Output Summary (Last 24 hours) at 6/13/2022 0532  Last data filed at 6/13/2022 0000  Gross per 24 hour   Intake 1095 ml   Output 150 ml   Net 945 ml       Physical Exam  Vitals reviewed.   Constitutional:       Appearance: Normal appearance.      Interventions: He is not intubated.  HENT:      Head: Normocephalic and atraumatic.      Nose: Nose normal.      Mouth/Throat:      Mouth: Mucous membranes are dry.      Pharynx: Oropharynx is clear.   Eyes:      Extraocular Movements: Extraocular movements intact.      Conjunctiva/sclera: Conjunctivae normal.      Pupils: Pupils are equal, round, and reactive to light.   Cardiovascular:      Rate and Rhythm: Normal rate.      Heart sounds: Normal heart sounds. No murmur heard.  Pulmonary:      Effort: Pulmonary effort is normal. He is not intubated.      Breath sounds: Normal breath sounds.   Abdominal:      General: Abdomen is flat. Bowel sounds are normal.      Palpations: Abdomen is soft.   Musculoskeletal:         General: Normal range of motion.      Cervical back: Normal range of motion and neck supple.      Right lower leg: No edema.      Left lower leg: No edema.   Skin:     General: Skin is warm and dry.      Capillary Refill: Capillary refill takes less than 2 seconds.   Neurological:      General: No focal deficit present.      Mental Status: He is alert and oriented to person, place, and time.   Psychiatric:         Mood and Affect: Mood normal.          Behavior: Behavior normal.       Vents:  Vent Mode: Standby (05/28/22 0415)  Ventilator Initiated: No (04/23/22 1934)  Set Rate: 0 BPM (05/18/22 0516)  Vt Set: 0 mL (05/18/22 0516)  Pressure Support: 15 cmH20 (05/22/22 0044)  PEEP/CPAP: 5 cmH20 (05/22/22 0044)  Oxygen Concentration (%): 28 (06/12/22 1615)  Peak Airway Pressure: 21 cmH2O (05/22/22 0044)  Plateau Pressure: 20 cmH20 (03/07/22 0500)  Total Ve: 7.8 mL (05/22/22 0044)  F/VT Ratio<105 (RSBI): (!) 22.64 (05/22/22 0044)    Lines/Drains/Airways       Central Venous Catheter Line  Duration                  Hemodialysis Catheter 12/30/21 0900 right internal jugular 164 days              Drain  Duration                  Colostomy 12/18/21 1030 Descending/sigmoid  days         Gastrostomy/Enterostomy 03/09/22 1436 Percutaneous endoscopic gastrostomy (PEG) midline feeding 95 days              Airway  Duration                  Surgical Airway 04/27/22 0856 Shiley;Other (Comment) Cuffed;Long 46 days              Peripheral Intravenous Line  Duration                  Peripheral IV - Single Lumen 06/03/22 1527 18 G Anterior;Right Forearm 9 days                    Significant Labs:    CBC/Anemia Profile:  No results for input(s): WBC, HGB, HCT, PLT, MCV, RDW, IRON, FERRITIN, RETIC, FOLATE, VFOTEVHA25, OCCULTBLOOD in the last 48 hours.     Chemistries:  No results for input(s): NA, K, CL, CO2, BUN, CREATININE, CALCIUM, ALBUMIN, PROT, BILITOT, ALKPHOS, ALT, AST, GLUCOSE, MG, PHOS in the last 48 hours.    Recent Lab Results         06/13/22  0501   06/12/22  2313   06/12/22  1743   06/12/22  1134        POC Glucose 121   115   134   149               Significant Imaging:  I have reviewed all pertinent imaging results/findings within the past 24 hours.

## 2022-06-13 NOTE — PROGRESS NOTES
[facility]  Pulmonology  Progress Note    Patient Name: Og Garcia  MRN: [id number]  Admission Date: 12/23/2021  Hospital Length of Stay: 172 days  Code Status: Prior  Attending Provider: Cecil Abernathy DO  Primary Care Provider: Hector Arndt DNP, FNP-C   Principal Problem: Denice gangrene    Subjective:     Interval History:  Patient without complaints    Objective:     Vital Signs (Most Recent):  Temp: (!) 101.3 °F (38.5 °C) (06/13/22 0417)  Pulse: 100 (06/13/22 0400)  Resp: (!) 26 (06/13/22 0400)  BP: (!) 96/45 (06/13/22 0522)  SpO2: 100 % (06/13/22 0400)   Vital Signs (24h Range):  Temp:  [99.3 °F (37.4 °C)-101.4 °F (38.6 °C)] 101.3 °F (38.5 °C)  Pulse:  [] 100  Resp:  [18-40] 26  SpO2:  [97 %-100 %] 100 %  BP: ()/(40-84) 96/45     Weight: 75.5 kg (166 lb 7.2 oz)  Body mass index is 23.21 kg/m².      Intake/Output Summary (Last 24 hours) at 6/13/2022 0532  Last data filed at 6/13/2022 0000  Gross per 24 hour   Intake 1095 ml   Output 150 ml   Net 945 ml       Physical Exam  Vitals reviewed.   Constitutional:       Appearance: Normal appearance.      Interventions: He is not intubated.  HENT:      Head: Normocephalic and atraumatic.      Nose: Nose normal.      Mouth/Throat:      Mouth: Mucous membranes are dry.      Pharynx: Oropharynx is clear.   Eyes:      Extraocular Movements: Extraocular movements intact.      Conjunctiva/sclera: Conjunctivae normal.      Pupils: Pupils are equal, round, and reactive to light.   Cardiovascular:      Rate and Rhythm: Normal rate.      Heart sounds: Normal heart sounds. No murmur heard.  Pulmonary:      Effort: Pulmonary effort is normal. He is not intubated.      Breath sounds: Normal breath sounds.   Abdominal:      General: Abdomen is flat. Bowel sounds are normal.      Palpations: Abdomen is soft.   Musculoskeletal:         General: Normal range of motion.      Cervical back: Normal range of motion and neck supple.       Right lower leg: No edema.      Left lower leg: No edema.   Skin:     General: Skin is warm and dry.      Capillary Refill: Capillary refill takes less than 2 seconds.   Neurological:      General: No focal deficit present.      Mental Status: He is alert and oriented to person, place, and time.   Psychiatric:         Mood and Affect: Mood normal.         Behavior: Behavior normal.       Vents:  Vent Mode: Standby (05/28/22 0415)  Ventilator Initiated: No (04/23/22 1934)  Set Rate: 0 BPM (05/18/22 0516)  Vt Set: 0 mL (05/18/22 0516)  Pressure Support: 15 cmH20 (05/22/22 0044)  PEEP/CPAP: 5 cmH20 (05/22/22 0044)  Oxygen Concentration (%): 28 (06/12/22 1615)  Peak Airway Pressure: 21 cmH2O (05/22/22 0044)  Plateau Pressure: 20 cmH20 (03/07/22 0500)  Total Ve: 7.8 mL (05/22/22 0044)  F/VT Ratio<105 (RSBI): (!) 22.64 (05/22/22 0044)    Lines/Drains/Airways       Central Venous Catheter Line  Duration                  Hemodialysis Catheter 12/30/21 0900 right internal jugular 164 days              Drain  Duration                  Colostomy 12/18/21 1030 Descending/sigmoid  days         Gastrostomy/Enterostomy 03/09/22 1436 Percutaneous endoscopic gastrostomy (PEG) midline feeding 95 days              Airway  Duration                  Surgical Airway 04/27/22 0856 Shiley;Other (Comment) Cuffed;Long 46 days              Peripheral Intravenous Line  Duration                  Peripheral IV - Single Lumen 06/03/22 1527 18 G Anterior;Right Forearm 9 days                    Significant Labs:    CBC/Anemia Profile:  No results for input(s): WBC, HGB, HCT, PLT, MCV, RDW, IRON, FERRITIN, RETIC, FOLATE, [id number], OCCULTBLOOD in the last 48 hours.     Chemistries:  No results for input(s): NA, K, CL, CO2, BUN, CREATININE, CALCIUM, ALBUMIN, PROT, BILITOT, ALKPHOS, ALT, AST, GLUCOSE, MG, PHOS in the last 48 hours.    Recent Lab Results         06/13/22  0501   06/12/22  2313   06/12/22  1743   06/12/22  1134        POC  Glucose 121   115   134   149               Significant Imaging:  I have reviewed all pertinent imaging results/findings within the past 24 hours.    Assessment/Plan:     * Denice gangrene  - Stable   - wound vac     Chronic respiratory failure    Patient looks comfortable with tracheostomy capped, may try to pull trach this week    Hypertension  Blood pressure looks okay continue medicines          ESRD (end stage renal disease)  - Started on HD 12/30   - Continue with HD per nephrology  - Now on epogen    Type 2 diabetes mellitus without complication, without long-term current use of insulin  - stable on reduced levemir dose    Acute blood loss anemia  No active bleeding    5/27- now on epogen  06/06- 7.4/22.5                     Jose Urban MD  Pulmonology  [facility]

## 2022-06-13 NOTE — PROGRESS NOTES
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 male with wounds to sacral, left lateral lower leg, right anterior foot, right hand, left posterior heel, and right lateral and medial foot.  Sacral wound is healing well, continue with NPWT. He had amputation of right 5th toe on 5/20/2022. He was evaluated by vascular surgery and is not currently a candidate for vascular intervention. He was taken back to OR for debridement on 6/10/2022. Bone culture and pathology confirmative for osteomyelitis. 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 Pertinent medical history includes diabetes, hypertension, anemia, chronic respiratory failure, and infection. Factors that complicate wound healing include ESRD, chronic anemia,chronic respiratory failure, multiple co-morbidities, poor vascular supply, diabetes, decreased granulation tissue, necrosis and infection.           Review of Systems   Unable to perform ROS: Acuity of condition     Objective:     Vital Signs (Most Recent):  Temp: 99.3 °F (37.4 °C) (06/13/22 0530)  Pulse: 102 (06/13/22 0827)  Resp: (!) 27 (06/13/22 0800)  BP: (!) 112/55 (06/13/22 0827)  SpO2: 100 % (06/13/22 0800) Vital Signs (24h Range):  Temp:  [99.3 °F (37.4 °C)-101.4 °F (38.6 °C)] 99.3 °F (37.4 °C)  Pulse:  [] 102  Resp:  [18-37] 27  SpO2:  [97 %-100 %] 100 %  BP: ()/(38-84) 112/55     Weight: 75.5 kg (166 lb 7.2 oz)  Body mass index is 23.21 kg/m².  No data recorded    Physical Exam  Vitals reviewed.    Constitutional:       Appearance: He is ill-appearing.      Comments: Chronically ill   HENT:      Head: Normocephalic.   Cardiovascular:      Rate and Rhythm: Regular rhythm. Tachycardia present.   Pulmonary:      Effort: Pulmonary effort is normal.   Skin:     Findings: Erythema present.      Comments: Multiple wounds   Neurological:      Mental Status: Mental status is at baseline.      Sensory: Sensory deficit present.      Motor: Weakness present.               Negative Pressure Wound Therapy  05/26/22 1300 medial (Active)   05/26/22 1300   Side:    Orientation: medial   Location: Coccyx   Additional Comments:    Removal Indication and Assessment:    Location:    SDO Location:    NPWT Type Vacuum Therapy 06/20/22 0600   Therapy Setting NPWT Continuous therapy 06/20/22 0600   Pressure Setting NPWT 125 mmHg 06/20/22 0600   Therapy Interventions NPWT Seal intact 06/20/22 0600   Sponges Inserted NPWT Black;1 06/16/22 1030   Sponges Removed NPWT Black;1 06/16/22 1030   General Output (mL) 100 06/19/22 1730   Number of days: 25            Altered Skin Integrity 01/20/22 1000 Right anterior Foot Purple or maroon localized area of discolored intact skin or non-intact skin or a blood-filled blister. (Active)   01/20/22 1000   Altered Skin Integrity Present on Admission: suspected hospital acquired   Side: Right   Orientation: anterior   Location: Foot   Wound Number:    Is this injury device related?:    Primary Wound Type:    Description of Altered Skin Integrity: Purple or maroon localized area of discolored intact skin or non-intact skin or a blood-filled blister.   Removal Indication and Assessment:    Wound Outcome:    (Retired) Wound Length (cm):    (Retired) Wound Width (cm):    (Retired) Depth (cm):    Wound Description (Comments):    Removal Indications:    Wound Image    06/13/22 0955   Description of Altered Skin Integrity Full thickness tissue loss. Base is covered by slough and/or eschar in the wound bed  06/13/22 0945   Dressing Appearance Moist drainage 06/16/22 1030   Drainage Amount Scant 06/16/22 1030   Drainage Characteristics/Odor Serous;Chung 06/16/22 1030   Appearance Dressing in place, unable to visualize 06/20/22 0400   Tissue loss description Not applicable 06/16/22 1030   Black (%), Wound Tissue Color 0 % 06/13/22 0945   Red (%), Wound Tissue Color 40 % 06/13/22 0945   Yellow (%), Wound Tissue Color 60 % 06/13/22 0945   Periwound Area Moist 06/16/22 1030   Wound Edges Defined;Open 06/16/22 1030   Wound Length (cm) 3 cm 06/13/22 0945   Wound Width (cm) 3 cm 06/13/22 0945   Wound Depth (cm) 0.5 cm 06/13/22 0945   Wound Volume (cm^3) 4.5 cm^3 06/13/22 0945   Wound Surface Area (cm^2) 9 cm^2 06/13/22 0945   Care Cleansed with:;Wound cleanser 06/19/22 1000   Dressing Removed;Applied;Changed;Gauze, wet to moist;Gauze;Rolled gauze 06/19/22 1000   Periwound Care Moisture barrier applied 06/18/22 1000   Off Loading Other (see comments) 06/13/22 0945   Dressing Change Due 06/20/22 06/19/22 1000   Number of days: 151            Altered Skin Integrity 01/20/22 1000 Right lateral Foot Purple or maroon localized area of discolored intact skin or non-intact skin or a blood-filled blister. (Active)   01/20/22 1000   Altered Skin Integrity Present on Admission: suspected hospital acquired   Side: Right   Orientation: lateral   Location: Foot   Wound Number:    Is this injury device related?:    Primary Wound Type:    Description of Altered Skin Integrity: Purple or maroon localized area of discolored intact skin or non-intact skin or a blood-filled blister.   Removal Indication and Assessment:    Wound Outcome:    (Retired) Wound Length (cm):    (Retired) Wound Width (cm):    (Retired) Depth (cm):    Wound Description (Comments):    Removal Indications:    Wound Image    06/13/22 0945   Description of Altered Skin Integrity Full thickness tissue loss with exposed bone, tendon, or muscle. Often includes undermining and  tunneling. May extend into muscle and/or supporting structures. 06/13/22 0945   Dressing Appearance Moist drainage 06/16/22 1030   Drainage Amount Small 06/16/22 1030   Drainage Characteristics/Odor Serous;Green 06/16/22 1030   Appearance Dressing in place, unable to visualize 06/20/22 0400   Tissue loss description Not applicable 06/16/22 1030   Black (%), Wound Tissue Color 0 % 06/13/22 0945   Red (%), Wound Tissue Color 50 % 06/13/22 0945   Yellow (%), Wound Tissue Color 50 % 06/13/22 0945   Periwound Area Moist 06/16/22 1030   Wound Edges Defined 06/16/22 1030   Wound Length (cm) 3 cm 06/13/22 0945   Wound Width (cm) 6 cm 06/13/22 0945   Wound Depth (cm) 0.5 cm 06/13/22 0945   Wound Volume (cm^3) 9 cm^3 06/13/22 0945   Wound Surface Area (cm^2) 18 cm^2 06/13/22 0945   Care Cleansed with:;Wound cleanser 06/19/22 1000   Dressing Removed;Applied;Changed;Gauze, wet to moist;Gauze;Rolled gauze 06/19/22 1000   Periwound Care Moisturizer applied 06/18/22 1000   Off Loading Other (see comments) 06/13/22 0945   Dressing Change Due 06/20/22 06/19/22 1000   Number of days: 151            Altered Skin Integrity 02/08/22 1300 Left posterior Heel Other (comment) Full thickness tissue loss. Base is covered by slough and/or eschar in the wound bed (Active)   02/08/22 1300   Altered Skin Integrity Present on Admission: yes   Side: Left   Orientation: posterior   Location: Heel   Wound Number:    Is this injury device related?: No   Primary Wound Type: Other   Description of Altered Skin Integrity: Full thickness tissue loss. Base is covered by slough and/or eschar in the wound bed   Removal Indication and Assessment:    Wound Outcome:    (Retired) Wound Length (cm):    (Retired) Wound Width (cm):    (Retired) Depth (cm):    Wound Description (Comments):    Removal Indications:    Wound Image    06/13/22 0945   Description of Altered Skin Integrity Full thickness tissue loss with exposed bone, tendon, or muscle. Often includes  undermining and tunneling. May extend into muscle and/or supporting structures. 06/13/22 0945   Dressing Appearance Moist drainage 06/16/22 1030   Drainage Amount Scant 06/16/22 1030   Drainage Characteristics/Odor Green;No odor;Serous 06/16/22 1030   Appearance Dressing in place, unable to visualize 06/20/22 0400   Tissue loss description Not applicable 06/16/22 1030   Black (%), Wound Tissue Color 50 % 06/13/22 0945   Red (%), Wound Tissue Color 25 % 06/13/22 0945   Yellow (%), Wound Tissue Color 25 % 06/13/22 0945   Periwound Area Moist 06/16/22 1030   Wound Edges Defined;Open 06/16/22 1030   Wound Length (cm) 7.5 cm 06/13/22 0945   Wound Width (cm) 6.5 cm 06/13/22 0945   Wound Depth (cm) 0.5 cm 06/13/22 0945   Wound Volume (cm^3) 24.375 cm^3 06/13/22 0945   Wound Surface Area (cm^2) 48.75 cm^2 06/13/22 0945   Care Cleansed with:;Wound cleanser 06/19/22 1000   Dressing Removed;Applied;Changed;Collagen;Gauze, wet to moist;Gauze;Rolled gauze 06/19/22 1000   Periwound Care Moisture barrier applied 06/18/22 1000   Off Loading Other (see comments) 06/13/22 0945   Dressing Change Due 06/19/22 06/18/22 1000   Number of days: 132            Altered Skin Integrity 02/15/22 1300 Left lateral Leg Traumatic Partial thickness tissue loss. Shallow open ulcer with a red or pink wound bed, without slough. Intact or Open/Ruptured Serum-filled blister. (Active)   02/15/22 1300   Altered Skin Integrity Present on Admission: suspected hospital acquired   Side: Left   Orientation: lateral   Location: Leg   Wound Number:    Is this injury device related?: No   Primary Wound Type: Traumatic   Description of Altered Skin Integrity: Partial thickness tissue loss. Shallow open ulcer with a red or pink wound bed, without slough. Intact or Open/Ruptured Serum-filled blister.   Removal Indication and Assessment:    Wound Outcome:    (Retired) Wound Length (cm):    (Retired) Wound Width (cm):    (Retired) Depth (cm):    Wound Description  (Comments):    Removal Indications:    Wound Image    05/31/22 1300   Description of Altered Skin Integrity Full thickness tissue loss. Base is covered by slough and/or eschar in the wound bed 04/26/22 1235   Dressing Appearance Open to air;Dry;Intact 06/20/22 0400   Drainage Amount None 06/13/22 0945   Appearance Dressing in place, unable to visualize 06/20/22 0000   Tissue loss description Not applicable 06/13/22 0945   Black (%), Wound Tissue Color 10 % 04/26/22 1235   Red (%), Wound Tissue Color 100 % 05/31/22 1300   Yellow (%), Wound Tissue Color 0 % 04/26/22 1235   Periwound Area Dry 06/13/22 0945   Wound Edges Defined 06/13/22 0945   Care Cleansed with:;Soap and water 06/08/22 1200   Dressing Applied;Calcium alginate 06/08/22 1200   Off Loading Other (see comments) 06/07/22 1235   Dressing Change Due 05/09/22 05/08/22 1200   Number of days: 125            Altered Skin Integrity 02/28/22 1300 Left anterior Foot Traumatic Partial thickness tissue loss. Shallow open ulcer with a red or pink wound bed, without slough. Intact or Open/Ruptured Serum-filled blister. (Active)   02/28/22 1300   Altered Skin Integrity Present on Admission: suspected hospital acquired   Side: Left   Orientation: anterior   Location: Foot   Wound Number:    Is this injury device related?: No   Primary Wound Type: Traumatic   Description of Altered Skin Integrity: Partial thickness tissue loss. Shallow open ulcer with a red or pink wound bed, without slough. Intact or Open/Ruptured Serum-filled blister.   Removal Indication and Assessment:    Wound Outcome:    (Retired) Wound Length (cm):    (Retired) Wound Width (cm):    (Retired) Depth (cm):    Wound Description (Comments):    Removal Indications:    Wound Image    06/13/22 0945   Description of Altered Skin Integrity Partial thickness tissue loss. Shallow open ulcer with a red or pink wound bed, without slough. Intact or Open/Ruptured Serum-filled blister. 05/12/22 1200   Dressing  Appearance Open to air;Dry 06/20/22 0400   Drainage Amount None 06/15/22 0945   Drainage Characteristics/Odor Serous 05/10/22 0742   Appearance Dressing in place, unable to visualize 06/20/22 0000   Tissue loss description Partial thickness 05/04/22 1300   Black (%), Wound Tissue Color 0 % 06/13/22 0945   Red (%), Wound Tissue Color 0 % 06/13/22 0945   Yellow (%), Wound Tissue Color 0 % 06/13/22 0945   Periwound Area Dry 06/13/22 0945   Wound Edges Defined 06/13/22 0945   Care Cleansed with:;Soap and water 06/15/22 0945   Dressing Changed 05/22/22 1400   Off Loading Other (see comments) 06/07/22 1235   Dressing Change Due 05/09/22 05/08/22 1200   Number of days: 112            Altered Skin Integrity 03/15/22 1000 Right lateral Malleolus Ulceration Purple or maroon localized area of discolored intact skin or non-intact skin or a blood-filled blister. (Active)   03/15/22 1000   Altered Skin Integrity Present on Admission: suspected hospital acquired   Side: Right   Orientation: lateral   Location: Malleolus   Wound Number:    Is this injury device related?: Yes   Primary Wound Type: Ulceration   Description of Altered Skin Integrity: Purple or maroon localized area of discolored intact skin or non-intact skin or a blood-filled blister.   Removal Indication and Assessment:    Wound Outcome:    (Retired) Wound Length (cm):    (Retired) Wound Width (cm):    (Retired) Depth (cm):    Wound Description (Comments):    Removal Indications:    Wound Image    06/13/22 0945   Description of Altered Skin Integrity Full thickness tissue loss. Subcutaneous fat may be visible but bone, tendon or muscle are not exposed 06/13/22 0945   Dressing Appearance Moist drainage 06/16/22 1030   Drainage Amount Small 06/16/22 1030   Drainage Characteristics/Odor Serosanguineous;No odor 06/16/22 1030   Appearance Dressing in place, unable to visualize 06/20/22 0400   Tissue loss description Full thickness 06/16/22 1030   Black (%), Wound Tissue  Color 0 % 06/13/22 0945   Red (%), Wound Tissue Color 99 % 06/13/22 0945   Yellow (%), Wound Tissue Color 1 % 06/13/22 0945   Periwound Area Moist 06/16/22 1030   Wound Edges Defined;Open 06/16/22 1030   Wound Length (cm) 3 cm 06/13/22 0945   Wound Width (cm) 2.5 cm 06/13/22 0945   Wound Depth (cm) 0.2 cm 06/13/22 0945   Wound Volume (cm^3) 1.5 cm^3 06/13/22 0945   Wound Surface Area (cm^2) 7.5 cm^2 06/13/22 0945   Care Cleansed with:;Wound cleanser 06/19/22 1000   Dressing Removed;Applied;Changed;Collagen;Gauze, wet to moist;Gauze;Rolled gauze 06/19/22 1000   Periwound Care Moisture barrier applied 06/16/22 1030   Off Loading Other (see comments) 06/13/22 0945   Dressing Change Due 06/20/22 06/19/22 1000   Number of days: 97            Altered Skin Integrity 03/15/22 1000 Right lateral Leg Ulceration Purple or maroon localized area of discolored intact skin or non-intact skin or a blood-filled blister. (Active)   03/15/22 1000   Altered Skin Integrity Present on Admission: suspected hospital acquired   Side: Right   Orientation: lateral   Location: Leg   Wound Number:    Is this injury device related?: No   Primary Wound Type: Ulceration   Description of Altered Skin Integrity: Purple or maroon localized area of discolored intact skin or non-intact skin or a blood-filled blister.   Removal Indication and Assessment:    Wound Outcome:    (Retired) Wound Length (cm):    (Retired) Wound Width (cm):    (Retired) Depth (cm):    Wound Description (Comments):    Removal Indications:    Wound Image    06/13/22 0945   Description of Altered Skin Integrity Full thickness tissue loss with exposed bone, tendon, or muscle. Often includes undermining and tunneling. May extend into muscle and/or supporting structures. 06/13/22 0945   Dressing Appearance Moist drainage 06/16/22 1030   Drainage Amount Small 06/16/22 1030   Drainage Characteristics/Odor Serosanguineous;No odor 06/16/22 1030   Appearance Dressing in place, unable to  visualize 06/20/22 0400   Tissue loss description Not applicable 06/16/22 1030   Black (%), Wound Tissue Color 0 % 06/13/22 0945   Red (%), Wound Tissue Color 95 % 06/13/22 0945   Yellow (%), Wound Tissue Color 5 % 06/13/22 0945   Periwound Area Moist 06/16/22 1030   Wound Edges Defined;Open 06/16/22 1030   Wound Length (cm) 12 cm 06/13/22 0945   Wound Width (cm) 3 cm 06/13/22 0945   Wound Depth (cm) 0.5 cm 06/13/22 0945   Wound Volume (cm^3) 18 cm^3 06/13/22 0945   Wound Surface Area (cm^2) 36 cm^2 06/13/22 0945   Care Cleansed with:;Wound cleanser 06/19/22 1000   Dressing Removed;Applied;Changed;Collagen;Gauze, wet to moist;Gauze;Rolled gauze 06/19/22 1000   Periwound Care Moisture barrier applied 06/16/22 1030   Off Loading Other (see comments) 06/13/22 0945   Dressing Change Due 06/20/22 06/19/22 1000   Number of days: 97            Altered Skin Integrity 03/22/22 1300 posterior Sacral spine Other (comment) Full thickness tissue loss. Base is covered by slough and/or eschar in the wound bed (Active)   03/22/22 1300   Altered Skin Integrity Present on Admission: yes   Side:    Orientation: posterior   Location: Sacral spine   Wound Number:    Is this injury device related?: No   Primary Wound Type: Other   Description of Altered Skin Integrity: Full thickness tissue loss. Base is covered by slough and/or eschar in the wound bed   Removal Indication and Assessment:    Wound Outcome:    (Retired) Wound Length (cm):    (Retired) Wound Width (cm):    (Retired) Depth (cm):    Wound Description (Comments):    Removal Indications:    Wound Image    06/13/22 0945   Description of Altered Skin Integrity Full thickness tissue loss with exposed bone, tendon, or muscle. Often includes undermining and tunneling. May extend into muscle and/or supporting structures. 06/13/22 0945   Dressing Appearance Moist drainage 06/16/22 1030   Drainage Amount Small 06/16/22 1030   Drainage Characteristics/Odor Serosanguineous;No odor  06/16/22 1030   Appearance Dressing in place, unable to visualize 06/20/22 0400   Tissue loss description Full thickness 06/16/22 1030   Black (%), Wound Tissue Color 0 % 06/13/22 0945   Red (%), Wound Tissue Color 90 % 06/13/22 0945   Yellow (%), Wound Tissue Color 10 % 06/13/22 0945   Periwound Area Hemosiderin Staining;Moist 06/16/22 1030   Wound Edges Defined;Open 06/16/22 1030   Wound Length (cm) 14 cm 06/13/22 0945   Wound Width (cm) 13 cm 06/13/22 0945   Wound Depth (cm) 2 cm 06/13/22 0945   Wound Volume (cm^3) 364 cm^3 06/13/22 0945   Wound Surface Area (cm^2) 182 cm^2 06/13/22 0945   Care Cleansed with:;Soap and water;Wound cleanser;Applied:;Removed:;Skin Barrier;Moisturizing agent 06/16/22 1030   Dressing Removed;Applied;Changed;Collagen;Gauze, wet to moist;Gauze 06/16/22 1030   Packing packed with;strip gauze;other (see comment) 05/22/22 1400   Periwound Care Skin barrier film applied 06/16/22 1030   Dressing Change Due 06/20/22 06/19/22 1000   Number of days: 90            Altered Skin Integrity 04/05/22 1130 Right anterior Toe, second Traumatic (Active)   04/05/22 1130   Altered Skin Integrity Present on Admission: suspected hospital acquired   Side: Right   Orientation: anterior   Location: Toe, second   Wound Number:    Is this injury device related?: No   Primary Wound Type: Traumatic   Description of Altered Skin Integrity:    Removal Indication and Assessment:    Wound Outcome:    (Retired) Wound Length (cm):    (Retired) Wound Width (cm):    (Retired) Depth (cm):    Wound Description (Comments):    Removal Indications:    Wound Image    06/13/22 0945   Dressing Appearance Open to air 06/20/22 0400   Drainage Amount None 06/19/22 2000   Drainage Characteristics/Odor Creamy 04/17/22 0800   Appearance Dressing in place, unable to visualize 06/19/22 0400   Tissue loss description Not applicable 06/15/22 0945   Wound Edges Undefined 06/15/22 0945   Care Cleansed with:;Soap and water 06/15/22 9852    Dressing Applied;Collagen 06/02/22 1200   Number of days: 76            Altered Skin Integrity 04/07/22 1130 posterior Buttocks Incontinence associated dermatitis (Active)   04/07/22 1130   Altered Skin Integrity Present on Admission: suspected hospital acquired   Side:    Orientation: posterior   Location: Buttocks   Wound Number:    Is this injury device related?: No   Primary Wound Type: Incontinence   Description of Altered Skin Integrity:    Removal Indication and Assessment:    Wound Outcome:    (Retired) Wound Length (cm):    (Retired) Wound Width (cm):    (Retired) Depth (cm):    Wound Description (Comments):    Removal Indications:    Wound Image    06/13/22 0945   Description of Altered Skin Integrity Partial thickness tissue loss. Shallow open ulcer with a red or pink wound bed, without slough. Intact or Open/Ruptured Serum-filled blister. 05/31/22 1300   Dressing Appearance Open to air;Dry 06/13/22 0945   Drainage Amount None 06/13/22 0945   Drainage Characteristics/Odor No odor 06/13/22 0945   Appearance Dressing in place, unable to visualize 06/20/22 0400   Tissue loss description Partial thickness 05/12/22 1200   Black (%), Wound Tissue Color 0 % 05/31/22 1300   Red (%), Wound Tissue Color 100 % 05/31/22 1300   Yellow (%), Wound Tissue Color 0 % 05/31/22 1300   Periwound Area Dry 06/13/22 0945   Wound Edges Defined 06/13/22 0945   Care Cleansed with:;Soap and water;Moisturizing agent;Applied: 06/13/22 0945   Dressing Removed;Applied 05/23/22 0000   Packing packed with;other (see comment) 05/23/22 0000   Periwound Care Moisture barrier applied 05/27/22 0705   Dressing Change Due 06/20/22 06/19/22 1000   Number of days: 74            Incision/Site 01/04/22 1444 Neck (Active)   01/04/22 1444   Present Prior to Hospital Arrival?: No   Side:    Location: Neck   Orientation:    Incision Type:    Closure Method:    Additional Comments:    Removal Indication and Assessment:    Wound Outcome:    Removal  Indications:    Incision WDL WDL 06/20/22 0400   Dressing Appearance Open to air 06/20/22 0400   Drainage Amount None 06/20/22 0400   Number of days: 167         Assessment and Plan      Osteomyelitis   ID following for antibiotics, pending pathology and bone culture reports  Wounds to lower extremities  Clean wound with acetic acid  Pat dry  Apply sensicare around wound  Apply santyl naz thick to wound bed, cover with vashe/saline moistened 4x4  Cover and secure with 4x4s, pascual, and paper tape  Change daily  Turn every two hours  Low air loss mattress  Keep pressure off wound  TAP system   W[employer]  Pressure ulcer of sacral region, stage 4  NPWT  Change M, TH  Turn every two hours  Low air loss mattress  Keep pressure off wound      TAP system          Signature:  HERSON Viveros  Wound Care    Date of encounter: 06/13/2022

## 2022-06-13 NOTE — DIALYSIS ROUNDING
Mr. Garcia is seen during his hemodialysis. He's tolerating today's dialysis w/o difficulty but bp tends to be low. We'll continue with his scheduled treatments.

## 2022-06-14 NOTE — ASSESSMENT & PLAN NOTE
No active bleeding    5/27- now on epogen  06/06- 6.4 today  Will transfuse 1 unit of packed red blood cells

## 2022-06-14 NOTE — ASSESSMENT & PLAN NOTE
6/1/2022  Continue with dialysis as scheduled  6/2/2022  Dialysis as scheduled  6/6/2022 The patient is doing acceptable on dialysis.  6/7/2022  ESRD and is doing well on dialysis.  Volume status is acceptable.  6/8/2022  Continue current dialysis schedule.  6/9/2022  Dialysis schedule continued on MW  6/13/2022 Dialysis today.

## 2022-06-14 NOTE — PLAN OF CARE
Problem: Adult Inpatient Plan of Care  Goal: Plan of Care Review  Outcome: Ongoing, Progressing  Flowsheets (Taken 6/14/2022 6062)  Plan of Care Reviewed With:   patient   family  Goal: Patient-Specific Goal (Individualized)  Outcome: Ongoing, Progressing  Goal: Absence of Hospital-Acquired Illness or Injury  Outcome: Ongoing, Progressing

## 2022-06-14 NOTE — PROGRESS NOTES
[facility]  Pulmonology  Progress Note    Patient Name: Og Garcia  MRN: [id number]  Admission Date: 12/23/2021  Hospital Length of Stay: 173 days  Code Status: Prior  Attending Provider: Cecil Abernathy DO  Primary Care Provider: Hector Arndt DNP, FNP-C   Principal Problem: Denice gangrene    Subjective:     Interval History:  Patient awake    Objective:     Vital Signs (Most Recent):  Temp: (!) 100.9 °F (38.3 °C) (06/14/22 0300)  Pulse: 107 (06/14/22 0500)  Resp: (!) 34 (06/14/22 0500)  BP: 108/65 (06/14/22 0500)  SpO2: 99 % (06/14/22 0500)   Vital Signs (24h Range):  Temp:  [99.3 °F (37.4 °C)-101.3 °F (38.5 °C)] 100.9 °F (38.3 °C)  Pulse:  [] 107  Resp:  [19-41] 34  SpO2:  [92 %-100 %] 99 %  BP: ()/(40-77) 108/65     Weight: 75.5 kg (166 lb 7.2 oz)  Body mass index is 23.21 kg/m².      Intake/Output Summary (Last 24 hours) at 6/14/2022 0529  Last data filed at 6/14/2022 0004  Gross per 24 hour   Intake 1795 ml   Output 1250 ml   Net 545 ml       Physical Exam  Vitals reviewed.   Constitutional:       Appearance: Normal appearance.      Interventions: He is not intubated.  HENT:      Head: Normocephalic and atraumatic.      Nose: Nose normal.      Mouth/Throat:      Mouth: Mucous membranes are dry.      Pharynx: Oropharynx is clear.   Eyes:      Extraocular Movements: Extraocular movements intact.      Conjunctiva/sclera: Conjunctivae normal.      Pupils: Pupils are equal, round, and reactive to light.   Cardiovascular:      Rate and Rhythm: Normal rate.      Heart sounds: Normal heart sounds. No murmur heard.  Pulmonary:      Effort: Pulmonary effort is normal. He is not intubated.      Breath sounds: Normal breath sounds.   Abdominal:      General: Abdomen is flat. Bowel sounds are normal.      Palpations: Abdomen is soft.   Musculoskeletal:         General: Normal range of motion.      Cervical back: Normal range of motion and neck supple.      Right lower leg:  No edema.      Left lower leg: No edema.   Skin:     General: Skin is warm and dry.      Capillary Refill: Capillary refill takes less than 2 seconds.   Neurological:      General: No focal deficit present.      Mental Status: He is alert and oriented to person, place, and time.   Psychiatric:         Mood and Affect: Mood normal.         Behavior: Behavior normal.       Vents:  Vent Mode: Standby (05/28/22 0415)  Ventilator Initiated: No (04/23/22 1934)  Set Rate: 0 BPM (05/18/22 0516)  Vt Set: 0 mL (05/18/22 0516)  Pressure Support: 15 cmH20 (05/22/22 0044)  PEEP/CPAP: 5 cmH20 (05/22/22 0044)  Oxygen Concentration (%): 28 (06/12/22 1615)  Peak Airway Pressure: 21 cmH2O (05/22/22 0044)  Plateau Pressure: 20 cmH20 (03/07/22 0500)  Total Ve: 7.8 mL (05/22/22 0044)  F/VT Ratio<105 (RSBI): (!) 22.64 (05/22/22 0044)    Lines/Drains/Airways       Central Venous Catheter Line  Duration                  Hemodialysis Catheter 12/30/21 0900 right internal jugular 165 days              Drain  Duration                  Colostomy 12/18/21 1030 Descending/sigmoid  days         Gastrostomy/Enterostomy 03/09/22 1436 Percutaneous endoscopic gastrostomy (PEG) midline feeding 96 days              Airway  Duration                  Surgical Airway 04/27/22 0856 Shiley;Other (Comment) Cuffed;Long 47 days              Peripheral Intravenous Line  Duration                  Peripheral IV - Single Lumen 06/03/22 1527 18 G Anterior;Right Forearm 10 days                    Significant Labs:    CBC/Anemia Profile:  Recent Labs   Lab 06/13/22 0521   WBC 13.21*   HGB 6.4*   HCT 21.2*   *   .4*   RDW 15.3*        Chemistries:  Recent Labs   Lab 06/13/22 0521      K 3.6      CO2 25   BUN 58*   CREATININE 2.57*   CALCIUM 8.6       Recent Lab Results         06/14/22  0013   06/13/22  2026   06/13/22  1738   06/13/22  1203        POC Glucose 107   120   139   121               Significant Imaging:  I have reviewed  all pertinent imaging results/findings within the past 24 hours.    Assessment/Plan:     * Denice gangrene  - Stable   - wound vac     Chronic respiratory failure    Patient looks comfortable with tracheostomy capped, may try to pull trach this week    Acute blood loss anemia  No active bleeding    5/27- now on epogen  06/06- 6.4 today  Will transfuse 1 unit of packed red blood cells                     Jose Urban MD  Pulmonology  [facility]

## 2022-06-14 NOTE — ASSESSMENT & PLAN NOTE
Continue current treatment  Chronic condition  Glucoses are acceptable.  6/13/2022 Condition is stable.

## 2022-06-14 NOTE — PROGRESS NOTES
[facility]  Nephrology  Progress Note    Patient Name: Og Garcia  MRN: [id number]  Admission Date: 12/23/2021  Hospital Length of Stay: 172 days  Attending Provider: Cecil Abernathy DO   Primary Care Physician: Hector Arndt DNP, FNP-C  Principal Problem:Denice gangrene    Subjective:     HPI: The patient is known from the previous nephrology consult at [facility].  He is no at Specialty and continues to need dialysis support.      Interval History: The patient tolerated dialysis today.  No other acute changes.    Review of patient's allergies indicates:  No Known Allergies  Current Facility-Administered Medications   Medication Frequency    0.9%  NaCl infusion (for blood administration) Q24H PRN    0.9%  NaCl infusion PRN    acetaminophen tablet 500 mg Q6H PRN    albuterol nebulizer solution 2.5 mg Q4H PRN    albuterol-ipratropium 2.5 mg-0.5 mg/3 mL nebulizer solution 3 mL Q6H    bisacodyL EC tablet 10 mg Daily PRN    collagenase ointment Daily PRN    dextrose 50% injection 12.5 g PRN    diltiaZEM tablet 90 mg Q6H    epoetin beth-epbx injection 10,000 Units Every Mon, Wed, Fri    glucagon (human recombinant) injection 1 mg PRN    guaiFENesin 100 mg/5 ml syrup 200 mg Q6H    heparin (porcine) injection 4,000 Units PRN    heparin (porcine) injection 5,000 Units Q8H    insulin aspart U-100 injection 1-10 Units Q6H    insulin detemir U-100 injection 15 Units QHS    linezolid tablet 600 mg Q12H    meropenem (MERREM) 500 mg in sodium chloride 0.9 % 100 mL IVPB (MB+) Q24H    metoprolol tartrate (LOPRESSOR) split tablet 12.5 mg BID    pantoprazole suspension 40 mg Daily    sevelamer carbonate pwpk 0.8 g TID WM    simethicone chewable tablet 80 mg TID PRN    sodium chloride 0.9% bolus 250 mL PRN    tobramycin - pharmacy to dose pharmacy to manage frequency       Objective:     Vital Signs (Most Recent):  Temp: 100.3 °F (37.9 °C) (06/13/22 1804)  Pulse: 95 (06/13/22  1909)  Resp: (!) 30 (06/13/22 1909)  BP: (!) 87/48 (06/13/22 1800)  SpO2: 100 % (06/13/22 1909)  O2 Device (Oxygen Therapy): nasal cannula (06/13/22 1909)   Vital Signs (24h Range):  Temp:  [99.3 °F (37.4 °C)-101.4 °F (38.6 °C)] 100.3 °F (37.9 °C)  Pulse:  [] 95  Resp:  [18-37] 30  SpO2:  [94 %-100 %] 100 %  BP: ()/(38-77) 87/48     Weight: 75.5 kg (166 lb 7.2 oz) (06/13/22 0410)  Body mass index is 23.21 kg/m².  Body surface area is 1.94 meters squared.    I/O last 3 completed shifts:  In: 2095 [NG/GT:1995; IV Piggyback:100]  Out: 1400 [Other:1250; Stool:150]    Physical Exam  Vitals reviewed.   HENT:      Head: Normocephalic.   Cardiovascular:      Rate and Rhythm: Regular rhythm.   Pulmonary:      Effort: Pulmonary effort is normal.   Abdominal:      Palpations: Abdomen is soft.   Neurological:      Mental Status: He is alert.       Significant Labs:  BMP:   Recent Labs   Lab 06/13/22  0521   *      K 3.6      CO2 25   BUN 58*   CREATININE 2.57*   CALCIUM 8.6     CBC:   Recent Labs   Lab 06/13/22  0521   WBC 13.21*   RBC 2.09*   HGB 6.4*   HCT 21.2*   *   .4*   MCH 30.6   MCHC 30.2*        Significant Imaging:  Labs: Reviewed    Assessment/Plan:     ESRD (end stage renal disease)  6/1/2022  Continue with dialysis as scheduled  6/2/2022  Dialysis as scheduled  6/6/2022 The patient is doing acceptable on dialysis.  6/7/2022  ESRD and is doing well on dialysis.  Volume status is acceptable.  6/8/2022  Continue current dialysis schedule.  6/9/2022  Dialysis schedule continued on MW  6/13/2022 Dialysis today.    Type 2 diabetes mellitus without complication, without long-term current use of insulin  Continue current treatment  Chronic condition  Glucoses are acceptable.  6/13/2022 Condition is stable.        Thank you for your consult. I will follow-up with patient. Please contact us if you have any additional questions.    Edwin Pryor Jr, MD  Nephrology  [facility]

## 2022-06-14 NOTE — SUBJECTIVE & OBJECTIVE
Interval History: The patient is doing acceptable.  No acute changes.    Review of patient's allergies indicates:  No Known Allergies  Current Facility-Administered Medications   Medication Frequency    0.9%  NaCl infusion (for blood administration) Q24H PRN    0.9%  NaCl infusion (for blood administration) Q24H PRN    0.9%  NaCl infusion PRN    acetaminophen tablet 500 mg Q6H PRN    albuterol nebulizer solution 2.5 mg Q4H PRN    albuterol-ipratropium 2.5 mg-0.5 mg/3 mL nebulizer solution 3 mL Q6H    bisacodyL EC tablet 10 mg Daily PRN    collagenase ointment Daily PRN    dextrose 50% injection 12.5 g PRN    diltiaZEM tablet 90 mg Q6H    epoetin beth-epbx injection 10,000 Units Every Mon, Wed, Fri    glucagon (human recombinant) injection 1 mg PRN    guaiFENesin 100 mg/5 ml syrup 200 mg Q6H    heparin (porcine) injection 4,000 Units PRN    heparin (porcine) injection 5,000 Units Q8H    insulin aspart U-100 injection 1-10 Units Q6H    insulin detemir U-100 injection 15 Units QHS    linezolid tablet 600 mg Q12H    meropenem (MERREM) 500 mg in sodium chloride 0.9 % 100 mL IVPB (MB+) Q24H    metoprolol tartrate (LOPRESSOR) split tablet 12.5 mg BID    pantoprazole suspension 40 mg Daily    sevelamer carbonate pwpk 0.8 g TID WM    simethicone chewable tablet 80 mg TID PRN    sodium chloride 0.9% bolus 250 mL PRN    tobramycin - pharmacy to dose pharmacy to manage frequency       Objective:     Vital Signs (Most Recent):  Temp: 100.1 °F (37.8 °C) (06/14/22 1430)  Pulse: 91 (06/14/22 1500)  Resp: (!) 26 (06/14/22 1500)  BP: (!) 111/54 (06/14/22 1500)  SpO2: 100 % (06/14/22 1500)  O2 Device (Oxygen Therapy): nasal cannula w/ humidification (06/14/22 1329)   Vital Signs (24h Range):  Temp:  [98.7 °F (37.1 °C)-101.3 °F (38.5 °C)] 100.1 °F (37.8 °C)  Pulse:  [] 91  Resp:  [23-41] 26  SpO2:  [90 %-100 %] 100 %  BP: ()/(40-65) 111/54     Weight: 73.2 kg (161 lb 6 oz) (06/14/22 0610)  Body mass index is 22.51  kg/m².  Body surface area is 1.91 meters squared.    I/O last 3 completed shifts:  In: 3165 [NG/GT:3065; IV Piggyback:100]  Out: 1500 [Other:1250; Stool:250]    Physical Exam  Vitals reviewed.   HENT:      Head: Normocephalic.      Mouth/Throat:      Mouth: Mucous membranes are moist.   Cardiovascular:      Rate and Rhythm: Regular rhythm.   Pulmonary:      Effort: Pulmonary effort is normal.   Abdominal:      Palpations: Abdomen is soft.   Neurological:      Mental Status: He is alert.       Significant Labs:  BMP:   Recent Labs   Lab 06/13/22  0521   *      K 3.6      CO2 25   BUN 58*   CREATININE 2.57*   CALCIUM 8.6     CBC:   Recent Labs   Lab 06/13/22  0521   WBC 13.21*   RBC 2.09*   HGB 6.4*   HCT 21.2*   *   .4*   MCH 30.6   MCHC 30.2*        Significant Imaging:  Labs: Reviewed

## 2022-06-14 NOTE — PROGRESS NOTES
Pharmacokinetic Follow Up: Tobramycin    Assessment of levels:   Pt's random tobra level of 2.3 is above the desired range of </= 2    Regimen Plan:   Pharmacy will not re-dose tobramycin at this time.  A random tobramycin level has been ordered for 6/15 at 0600.    Drug levels (last 3 results):  No results for input(s): AMIKACINPEAK, AMIKACINTROU, AMIKACINRAND, AMIKACIN in the last 72 hours.    No results for input(s): GENTAMICIN, GENTPEAK, GENTTROUGH, GENT10, GENT12, GENT8, GENTRANDOM in the last 72 hours.    Recent Labs   Lab Result Units 06/13/22  0521   Tobramycin, Trough µg/mL 2.3*       Pharmacy will continue to monitor.    Please contact pharmacy at extension [phone] with any questions regarding this assessment.    Patient brief summary:  Og Garcia is a 67 y.o. male initiated on aminoglycoside therapy for treatment of bone/joint infection    Drug Allergies:   Review of patient's allergies indicates:  No Known Allergies    Actual Body Weight:   73.2 kg    Adjust Body Weight:   73.2 kg    Ideal Body Weight:  75.3 kg    Renal Function:   Estimated Creatinine Clearance: 28.9 mL/min (A) (based on SCr of 2.57 mg/dL (H)).,     Dialysis Method (if applicable):  intermittent HD    CBC (last 72 hours):  Recent Labs   Lab Result Units 06/13/22  0521   WBC K/uL 13.21*   Hemoglobin g/dL 6.4*   Hematocrit % 21.2*   Platelet Count K/uL 409*   Lymphocytes % % 19.2*   Monocytes % % 10.6*   Eosinophils % % 3.9   Basophils % % 0.4       Metabolic Panel (last 72 hours):  Recent Labs   Lab Result Units 06/13/22  0521   Sodium mmol/L 141   Potassium mmol/L 3.6   Chloride mmol/L 103   CO2 mmol/L 25   Glucose mg/dL 110*   BUN mg/dL 58*   Creatinine mg/dL 2.57*       Aminoglycoside Administrations:  aminoglycosides given in last 96 hours                     tobramycin (NEBCIN) 220 mg in dextrose 5 % IVPB (mg) 220 mg New Bag 06/10/22 7451                    Microbiologic Results:  Microbiology Results (last 7 days)        Procedure Component Value Units Date/Time    Blood culture [[id number]] Collected: 06/12/22 1654    Order Status: Completed Specimen: Blood Updated: 06/14/22 0748     Culture, Blood No Growth At 24 Hours    Blood culture [[id number]] Collected: 06/12/22 1654    Order Status: Completed Specimen: Blood Updated: 06/14/22 0747     Culture, Blood No Growth At 24 Hours

## 2022-06-14 NOTE — PLAN OF CARE
Problem: Gas Exchange Impaired  Goal: Optimal Gas Exchange  6/14/2022 0852 by Carmel López, RRT  Outcome: Ongoing, Progressing  6/14/2022 0850 by Carmel López, RRT  Outcome: Ongoing, Progressing

## 2022-06-14 NOTE — SUBJECTIVE & OBJECTIVE
Interval History:  Patient awake    Objective:     Vital Signs (Most Recent):  Temp: (!) 100.9 °F (38.3 °C) (06/14/22 0300)  Pulse: 107 (06/14/22 0500)  Resp: (!) 34 (06/14/22 0500)  BP: 108/65 (06/14/22 0500)  SpO2: 99 % (06/14/22 0500)   Vital Signs (24h Range):  Temp:  [99.3 °F (37.4 °C)-101.3 °F (38.5 °C)] 100.9 °F (38.3 °C)  Pulse:  [] 107  Resp:  [19-41] 34  SpO2:  [92 %-100 %] 99 %  BP: ()/(40-77) 108/65     Weight: 75.5 kg (166 lb 7.2 oz)  Body mass index is 23.21 kg/m².      Intake/Output Summary (Last 24 hours) at 6/14/2022 0529  Last data filed at 6/14/2022 0004  Gross per 24 hour   Intake 1795 ml   Output 1250 ml   Net 545 ml       Physical Exam  Vitals reviewed.   Constitutional:       Appearance: Normal appearance.      Interventions: He is not intubated.  HENT:      Head: Normocephalic and atraumatic.      Nose: Nose normal.      Mouth/Throat:      Mouth: Mucous membranes are dry.      Pharynx: Oropharynx is clear.   Eyes:      Extraocular Movements: Extraocular movements intact.      Conjunctiva/sclera: Conjunctivae normal.      Pupils: Pupils are equal, round, and reactive to light.   Cardiovascular:      Rate and Rhythm: Normal rate.      Heart sounds: Normal heart sounds. No murmur heard.  Pulmonary:      Effort: Pulmonary effort is normal. He is not intubated.      Breath sounds: Normal breath sounds.   Abdominal:      General: Abdomen is flat. Bowel sounds are normal.      Palpations: Abdomen is soft.   Musculoskeletal:         General: Normal range of motion.      Cervical back: Normal range of motion and neck supple.      Right lower leg: No edema.      Left lower leg: No edema.   Skin:     General: Skin is warm and dry.      Capillary Refill: Capillary refill takes less than 2 seconds.   Neurological:      General: No focal deficit present.      Mental Status: He is alert and oriented to person, place, and time.   Psychiatric:         Mood and Affect: Mood normal.          Behavior: Behavior normal.       Vents:  Vent Mode: Standby (05/28/22 0415)  Ventilator Initiated: No (04/23/22 1934)  Set Rate: 0 BPM (05/18/22 0516)  Vt Set: 0 mL (05/18/22 0516)  Pressure Support: 15 cmH20 (05/22/22 0044)  PEEP/CPAP: 5 cmH20 (05/22/22 0044)  Oxygen Concentration (%): 28 (06/12/22 1615)  Peak Airway Pressure: 21 cmH2O (05/22/22 0044)  Plateau Pressure: 20 cmH20 (03/07/22 0500)  Total Ve: 7.8 mL (05/22/22 0044)  F/VT Ratio<105 (RSBI): (!) 22.64 (05/22/22 0044)    Lines/Drains/Airways       Central Venous Catheter Line  Duration                  Hemodialysis Catheter 12/30/21 0900 right internal jugular 165 days              Drain  Duration                  Colostomy 12/18/21 1030 Descending/sigmoid  days         Gastrostomy/Enterostomy 03/09/22 1436 Percutaneous endoscopic gastrostomy (PEG) midline feeding 96 days              Airway  Duration                  Surgical Airway 04/27/22 0856 Shiley;Other (Comment) Cuffed;Long 47 days              Peripheral Intravenous Line  Duration                  Peripheral IV - Single Lumen 06/03/22 1527 18 G Anterior;Right Forearm 10 days                    Significant Labs:    CBC/Anemia Profile:  Recent Labs   Lab 06/13/22  0521   WBC 13.21*   HGB 6.4*   HCT 21.2*   *   .4*   RDW 15.3*        Chemistries:  Recent Labs   Lab 06/13/22  0521      K 3.6      CO2 25   BUN 58*   CREATININE 2.57*   CALCIUM 8.6       Recent Lab Results         06/14/22  0013   06/13/22  2026   06/13/22  1738   06/13/22  1203        POC Glucose 107   120   139   121               Significant Imaging:  I have reviewed all pertinent imaging results/findings within the past 24 hours.

## 2022-06-14 NOTE — SUBJECTIVE & OBJECTIVE
Interval History: The patient tolerated dialysis today.  No other acute changes.    Review of patient's allergies indicates:  No Known Allergies  Current Facility-Administered Medications   Medication Frequency    0.9%  NaCl infusion (for blood administration) Q24H PRN    0.9%  NaCl infusion PRN    acetaminophen tablet 500 mg Q6H PRN    albuterol nebulizer solution 2.5 mg Q4H PRN    albuterol-ipratropium 2.5 mg-0.5 mg/3 mL nebulizer solution 3 mL Q6H    bisacodyL EC tablet 10 mg Daily PRN    collagenase ointment Daily PRN    dextrose 50% injection 12.5 g PRN    diltiaZEM tablet 90 mg Q6H    epoetin beth-epbx injection 10,000 Units Every Mon, Wed, Fri    glucagon (human recombinant) injection 1 mg PRN    guaiFENesin 100 mg/5 ml syrup 200 mg Q6H    heparin (porcine) injection 4,000 Units PRN    heparin (porcine) injection 5,000 Units Q8H    insulin aspart U-100 injection 1-10 Units Q6H    insulin detemir U-100 injection 15 Units QHS    linezolid tablet 600 mg Q12H    meropenem (MERREM) 500 mg in sodium chloride 0.9 % 100 mL IVPB (MB+) Q24H    metoprolol tartrate (LOPRESSOR) split tablet 12.5 mg BID    pantoprazole suspension 40 mg Daily    sevelamer carbonate pwpk 0.8 g TID WM    simethicone chewable tablet 80 mg TID PRN    sodium chloride 0.9% bolus 250 mL PRN    tobramycin - pharmacy to dose pharmacy to manage frequency       Objective:     Vital Signs (Most Recent):  Temp: 100.3 °F (37.9 °C) (06/13/22 1804)  Pulse: 95 (06/13/22 1909)  Resp: (!) 30 (06/13/22 1909)  BP: (!) 87/48 (06/13/22 1800)  SpO2: 100 % (06/13/22 1909)  O2 Device (Oxygen Therapy): nasal cannula (06/13/22 1909)   Vital Signs (24h Range):  Temp:  [99.3 °F (37.4 °C)-101.4 °F (38.6 °C)] 100.3 °F (37.9 °C)  Pulse:  [] 95  Resp:  [18-37] 30  SpO2:  [94 %-100 %] 100 %  BP: ()/(38-77) 87/48     Weight: 75.5 kg (166 lb 7.2 oz) (06/13/22 0410)  Body mass index is 23.21 kg/m².  Body surface area is 1.94 meters squared.    I/O last 3 completed  shifts:  In: 2095 [NG/GT:1995; IV Piggyback:100]  Out: 1400 [Other:1250; Stool:150]    Physical Exam  Vitals reviewed.   HENT:      Head: Normocephalic.   Cardiovascular:      Rate and Rhythm: Regular rhythm.   Pulmonary:      Effort: Pulmonary effort is normal.   Abdominal:      Palpations: Abdomen is soft.   Neurological:      Mental Status: He is alert.       Significant Labs:  BMP:   Recent Labs   Lab 06/13/22  0521   *      K 3.6      CO2 25   BUN 58*   CREATININE 2.57*   CALCIUM 8.6     CBC:   Recent Labs   Lab 06/13/22  0521   WBC 13.21*   RBC 2.09*   HGB 6.4*   HCT 21.2*   *   .4*   MCH 30.6   MCHC 30.2*        Significant Imaging:  Labs: Reviewed

## 2022-06-14 NOTE — PROGRESS NOTES
[facility]  Nephrology  Progress Note    Patient Name: Og Garcia  MRN: [id number]  Admission Date: 12/23/2021  Hospital Length of Stay: 173 days  Attending Provider: Cecil Abernathy DO   Primary Care Physician: Hector Arndt DNP, FNP-C  Principal Problem:Denice gangrene    Subjective:     HPI: The patient is known from the previous nephrology consult at [facility].  He is no at Specialty and continues to need dialysis support.      Interval History: The patient is doing acceptable.  No acute changes.    Review of patient's allergies indicates:  No Known Allergies  Current Facility-Administered Medications   Medication Frequency    0.9%  NaCl infusion (for blood administration) Q24H PRN    0.9%  NaCl infusion (for blood administration) Q24H PRN    0.9%  NaCl infusion PRN    acetaminophen tablet 500 mg Q6H PRN    albuterol nebulizer solution 2.5 mg Q4H PRN    albuterol-ipratropium 2.5 mg-0.5 mg/3 mL nebulizer solution 3 mL Q6H    bisacodyL EC tablet 10 mg Daily PRN    collagenase ointment Daily PRN    dextrose 50% injection 12.5 g PRN    diltiaZEM tablet 90 mg Q6H    epoetin beth-epbx injection 10,000 Units Every Mon, Wed, Fri    glucagon (human recombinant) injection 1 mg PRN    guaiFENesin 100 mg/5 ml syrup 200 mg Q6H    heparin (porcine) injection 4,000 Units PRN    heparin (porcine) injection 5,000 Units Q8H    insulin aspart U-100 injection 1-10 Units Q6H    insulin detemir U-100 injection 15 Units QHS    linezolid tablet 600 mg Q12H    meropenem (MERREM) 500 mg in sodium chloride 0.9 % 100 mL IVPB (MB+) Q24H    metoprolol tartrate (LOPRESSOR) split tablet 12.5 mg BID    pantoprazole suspension 40 mg Daily    sevelamer carbonate pwpk 0.8 g TID WM    simethicone chewable tablet 80 mg TID PRN    sodium chloride 0.9% bolus 250 mL PRN    tobramycin - pharmacy to dose pharmacy to manage frequency       Objective:     Vital Signs (Most Recent):  Temp: 100.1 °F  (37.8 °C) (06/14/22 1430)  Pulse: 91 (06/14/22 1500)  Resp: (!) 26 (06/14/22 1500)  BP: (!) 111/54 (06/14/22 1500)  SpO2: 100 % (06/14/22 1500)  O2 Device (Oxygen Therapy): nasal cannula w/ humidification (06/14/22 1329)   Vital Signs (24h Range):  Temp:  [98.7 °F (37.1 °C)-101.3 °F (38.5 °C)] 100.1 °F (37.8 °C)  Pulse:  [] 91  Resp:  [23-41] 26  SpO2:  [90 %-100 %] 100 %  BP: ()/(40-65) 111/54     Weight: 73.2 kg (161 lb 6 oz) (06/14/22 0610)  Body mass index is 22.51 kg/m².  Body surface area is 1.91 meters squared.    I/O last 3 completed shifts:  In: 3165 [NG/GT:3065; IV Piggyback:100]  Out: 1500 [Other:1250; Stool:250]    Physical Exam  Vitals reviewed.   HENT:      Head: Normocephalic.      Mouth/Throat:      Mouth: Mucous membranes are moist.   Cardiovascular:      Rate and Rhythm: Regular rhythm.   Pulmonary:      Effort: Pulmonary effort is normal.   Abdominal:      Palpations: Abdomen is soft.   Neurological:      Mental Status: He is alert.       Significant Labs:  BMP:   Recent Labs   Lab 06/13/22  0521   *      K 3.6      CO2 25   BUN 58*   CREATININE 2.57*   CALCIUM 8.6     CBC:   Recent Labs   Lab 06/13/22  0521   WBC 13.21*   RBC 2.09*   HGB 6.4*   HCT 21.2*   *   .4*   MCH 30.6   MCHC 30.2*        Significant Imaging:  Labs: Reviewed    Assessment/Plan:     ESRD (end stage renal disease)    6/13/2022 Dialysis today.  6/14/2022 Continue with dialysis for this patient.        Thank you for your consult. I will follow-up with patient. Please contact us if you have any additional questions.    Edwin Pryor Jr, MD  Nephrology  [facility]

## 2022-06-15 NOTE — DIALYSIS ROUNDING
"          Nephrology Department            NAME: Og Garcia   YOB: 1955  MRN: [id number]  NOTE DATE: 06/15/2022       Seen on dialysis. He is tolerating the procedure.  His blood pressure runs low.    Patient complains:  None.      REVIEW OF SYSTEMS:  he reports no shortness of breath, nausea or vomiting. No acute changes.    VITALS:  height is 5' 11" (1.803 m) and weight is 73.2 kg (161 lb 6 oz). His axillary temperature is 99.1 °F (37.3 °C). His blood pressure is 111/52 (abnormal) and his pulse is 87. His respiration is 27 (abnormal) and oxygen saturation is 100%.  Hemodialysis documentation flowsheet reviewed with dialysis nurse, including weight and vitals.     NAD.  Respiration unlabored. Lungs clear.  Heart regular.  Abdomen soft, nontender, positive bowl sounds  No edema.    Recent Labs   Lab 06/10/22  0731 06/13/22  0521    141   K 4.2 3.6    103   CO2 27 25   BUN 45* 58*   CREATININE 1.82* 2.57*   CALCIUM 9.0 8.6     Recent Labs   Lab 06/10/22  0731 06/13/22  0521 06/14/22  1831   WBC 13.65* 13.21*  --    HGB 7.4* 6.4* 7.8*   HCT 24.2* 21.2* 24.9*    409*  --        ASSESSMENT/PLAN:  Continue with scheduled hemodialysis for this patient.    Edwin Pryor Jr, MD  "

## 2022-06-15 NOTE — PROGRESS NOTES
Pharmacokinetic Follow Up: Tobramycin    Assessment of levels:   Today's random tobra level of 0.7 is within the desired range of </= 2    Regimen Plan:   Tobra 220 mg IV x 1 today.  A random tobra level has been ordered for 6/20 at 0600.    Drug levels (last 3 results):  No results for input(s): AMIKACINPEAK, AMIKACINTROU, AMIKACINRAND, AMIKACIN in the last 72 hours.    No results for input(s): GENTAMICIN, GENTPEAK, GENTTROUGH, GENT10, GENT12, GENT8, GENTRANDOM in the last 72 hours.    Recent Labs   Lab Result Units 06/13/22  0521 06/15/22  0429   Tobramycin, Random µg/mL  --  0.7   Tobramycin, Trough µg/mL 2.3*  --        Pharmacy will continue to monitor.    Please contact pharmacy at extension [phone] with any questions regarding this assessment.    Patient brief summary:  Og Garcia is a 67 y.o. male initiated on aminoglycoside therapy for treatment of bone/joint infection    Drug Allergies:   Review of patient's allergies indicates:  No Known Allergies    Actual Body Weight:   73.2 kg    Adjust Body Weight:   73.2 kg    Ideal Body Weight:  75.3 kg    Renal Function:   Estimated Creatinine Clearance: 28.9 mL/min (A) (based on SCr of 2.57 mg/dL (H)).,     Dialysis Method (if applicable):  intermittent HD    CBC (last 72 hours):  Recent Labs   Lab Result Units 06/13/22  0521 06/14/22  1831   WBC K/uL 13.21*  --    Hemoglobin g/dL 6.4* 7.8*   Hematocrit % 21.2* 24.9*   Platelet Count K/uL 409*  --    Lymphocytes % % 19.2*  --    Monocytes % % 10.6*  --    Eosinophils % % 3.9  --    Basophils % % 0.4  --        Metabolic Panel (last 72 hours):  Recent Labs   Lab Result Units 06/13/22  0521   Sodium mmol/L 141   Potassium mmol/L 3.6   Chloride mmol/L 103   CO2 mmol/L 25   Glucose mg/dL 110*   BUN mg/dL 58*   Creatinine mg/dL 2.57*       Aminoglycoside Administrations:  aminoglycosides given in last 96 hours        No antibiotic orders with administrations found.                    Microbiologic  Results:  Microbiology Results (last 7 days)       Procedure Component Value Units Date/Time    Blood culture [[id number]] Collected: 06/12/22 1654    Order Status: Completed Specimen: Blood Updated: 06/15/22 0805     Culture, Blood No Growth At 48 Hours    Blood culture [[id number]] Collected: 06/12/22 1654    Order Status: Completed Specimen: Blood Updated: 06/15/22 0805     Culture, Blood No Growth At 48 Hours

## 2022-06-15 NOTE — SUBJECTIVE & OBJECTIVE
Interval History:  No history    Objective:     Vital Signs (Most Recent):  Temp: 99.3 °F (37.4 °C) (06/15/22 0300)  Pulse: 93 (06/15/22 0615)  Resp: (!) 37 (06/15/22 0615)  BP: (!) 104/49 (06/15/22 0615)  SpO2: 100 % (06/15/22 0615)   Vital Signs (24h Range):  Temp:  [97.9 °F (36.6 °C)-100.1 °F (37.8 °C)] 99.3 °F (37.4 °C)  Pulse:  [] 93  Resp:  [20-40] 37  SpO2:  [90 %-100 %] 100 %  BP: ()/(38-63) 104/49     Weight: 73.2 kg (161 lb 6 oz)  Body mass index is 22.51 kg/m².      Intake/Output Summary (Last 24 hours) at 6/15/2022 0638  Last data filed at 6/14/2022 1800  Gross per 24 hour   Intake 580 ml   Output --   Net 580 ml       Physical Exam  Vitals reviewed.   Constitutional:       Appearance: Normal appearance. He is ill-appearing.      Interventions: He is not intubated.     Comments: Tracheostomy/absent eye   HENT:      Head: Normocephalic and atraumatic.      Nose: Nose normal.      Mouth/Throat:      Mouth: Mucous membranes are dry.      Pharynx: Oropharynx is clear.   Eyes:      Extraocular Movements: Extraocular movements intact.      Conjunctiva/sclera: Conjunctivae normal.      Pupils: Pupils are equal, round, and reactive to light.   Cardiovascular:      Rate and Rhythm: Normal rate.      Heart sounds: Normal heart sounds. No murmur heard.  Pulmonary:      Effort: Pulmonary effort is normal. He is not intubated.      Breath sounds: Normal breath sounds.   Abdominal:      General: Abdomen is flat. Bowel sounds are normal.      Palpations: Abdomen is soft.   Musculoskeletal:         General: Normal range of motion.      Cervical back: Normal range of motion and neck supple.      Right lower leg: No edema.      Left lower leg: No edema.   Skin:     General: Skin is warm and dry.      Capillary Refill: Capillary refill takes less than 2 seconds.   Neurological:      General: No focal deficit present.      Mental Status: He is alert and oriented to person, place, and time.   Psychiatric:          Mood and Affect: Mood normal.         Behavior: Behavior normal.       Vents:  Vent Mode: Standby (05/28/22 0415)  Ventilator Initiated: No (04/23/22 1934)  Set Rate: 0 BPM (05/18/22 0516)  Vt Set: 0 mL (05/18/22 0516)  Pressure Support: 15 cmH20 (05/22/22 0044)  PEEP/CPAP: 5 cmH20 (05/22/22 0044)  Oxygen Concentration (%): 28 (06/14/22 0730)  Peak Airway Pressure: 21 cmH2O (05/22/22 0044)  Plateau Pressure: 20 cmH20 (03/07/22 0500)  Total Ve: 7.8 mL (05/22/22 0044)  F/VT Ratio<105 (RSBI): (!) 22.64 (05/22/22 0044)    Lines/Drains/Airways       Central Venous Catheter Line  Duration                  Hemodialysis Catheter 12/30/21 0900 right internal jugular 166 days              Drain  Duration                  Colostomy 12/18/21 1030 Descending/sigmoid  days         Gastrostomy/Enterostomy 03/09/22 1436 Percutaneous endoscopic gastrostomy (PEG) midline feeding 97 days              Airway  Duration                  Surgical Airway 04/27/22 0856 Shiley;Other (Comment) Cuffed;Long 48 days              Peripheral Intravenous Line  Duration                  Peripheral IV - Single Lumen 06/03/22 1527 18 G Anterior;Right Forearm 11 days                    Significant Labs:    CBC/Anemia Profile:  Recent Labs   Lab 06/14/22  1831   HGB 7.8*   HCT 24.9*        Chemistries:  No results for input(s): NA, K, CL, CO2, BUN, CREATININE, CALCIUM, ALBUMIN, PROT, BILITOT, ALKPHOS, ALT, AST, GLUCOSE, MG, PHOS in the last 48 hours.    Recent Lab Results  (Last 5 results in the past 24 hours)        06/15/22  0552   06/15/22  0429   06/15/22  0004   06/14/22  2047   06/14/22  1831        Hematocrit         24.9       Hemoglobin         7.8       POC Glucose 98     111   116         Tobramycin, Random   0.7                                    Significant Imaging:  I have reviewed all pertinent imaging results/findings within the past 24 hours.

## 2022-06-15 NOTE — ASSESSMENT & PLAN NOTE
This morning the big issue with secretions going to add SSKI despite being renal failure watch his potassium and were going to give him Mucomyst bronchodilators of his secretions are better will try to pull trach Thursday or Friday

## 2022-06-15 NOTE — PROGRESS NOTES
[facility]  Pulmonology  Progress Note    Patient Name: Og Garcia  MRN: [id number]  Admission Date: 12/23/2021  Hospital Length of Stay: 174 days  Code Status: Prior  Attending Provider: Cecil Abernathy DO  Primary Care Provider: Hector Arndt DNP, FNP-C   Principal Problem: Denice gangrene    Subjective:     Interval History:  No history    Objective:     Vital Signs (Most Recent):  Temp: 99.3 °F (37.4 °C) (06/15/22 0300)  Pulse: 93 (06/15/22 0615)  Resp: (!) 37 (06/15/22 0615)  BP: (!) 104/49 (06/15/22 0615)  SpO2: 100 % (06/15/22 0615)   Vital Signs (24h Range):  Temp:  [97.9 °F (36.6 °C)-100.1 °F (37.8 °C)] 99.3 °F (37.4 °C)  Pulse:  [] 93  Resp:  [20-40] 37  SpO2:  [90 %-100 %] 100 %  BP: ()/(38-63) 104/49     Weight: 73.2 kg (161 lb 6 oz)  Body mass index is 22.51 kg/m².      Intake/Output Summary (Last 24 hours) at 6/15/2022 0638  Last data filed at 6/14/2022 1800  Gross per 24 hour   Intake 580 ml   Output --   Net 580 ml       Physical Exam  Vitals reviewed.   Constitutional:       Appearance: Normal appearance. He is ill-appearing.      Interventions: He is not intubated.     Comments: Tracheostomy/absent eye   HENT:      Head: Normocephalic and atraumatic.      Nose: Nose normal.      Mouth/Throat:      Mouth: Mucous membranes are dry.      Pharynx: Oropharynx is clear.   Eyes:      Extraocular Movements: Extraocular movements intact.      Conjunctiva/sclera: Conjunctivae normal.      Pupils: Pupils are equal, round, and reactive to light.   Cardiovascular:      Rate and Rhythm: Normal rate.      Heart sounds: Normal heart sounds. No murmur heard.  Pulmonary:      Effort: Pulmonary effort is normal. He is not intubated.      Breath sounds: Normal breath sounds.   Abdominal:      General: Abdomen is flat. Bowel sounds are normal.      Palpations: Abdomen is soft.   Musculoskeletal:         General: Normal range of motion.      Cervical back: Normal range  of motion and neck supple.      Right lower leg: No edema.      Left lower leg: No edema.   Skin:     General: Skin is warm and dry.      Capillary Refill: Capillary refill takes less than 2 seconds.   Neurological:      General: No focal deficit present.      Mental Status: He is alert and oriented to person, place, and time.   Psychiatric:         Mood and Affect: Mood normal.         Behavior: Behavior normal.       Vents:  Vent Mode: Standby (05/28/22 0415)  Ventilator Initiated: No (04/23/22 1934)  Set Rate: 0 BPM (05/18/22 0516)  Vt Set: 0 mL (05/18/22 0516)  Pressure Support: 15 cmH20 (05/22/22 0044)  PEEP/CPAP: 5 cmH20 (05/22/22 0044)  Oxygen Concentration (%): 28 (06/14/22 0730)  Peak Airway Pressure: 21 cmH2O (05/22/22 0044)  Plateau Pressure: 20 cmH20 (03/07/22 0500)  Total Ve: 7.8 mL (05/22/22 0044)  F/VT Ratio<105 (RSBI): (!) 22.64 (05/22/22 0044)    Lines/Drains/Airways       Central Venous Catheter Line  Duration                  Hemodialysis Catheter 12/30/21 0900 right internal jugular 166 days              Drain  Duration                  Colostomy 12/18/21 1030 Descending/sigmoid  days         Gastrostomy/Enterostomy 03/09/22 1436 Percutaneous endoscopic gastrostomy (PEG) midline feeding 97 days              Airway  Duration                  Surgical Airway 04/27/22 0856 Shiley;Other (Comment) Cuffed;Long 48 days              Peripheral Intravenous Line  Duration                  Peripheral IV - Single Lumen 06/03/22 1527 18 G Anterior;Right Forearm 11 days                    Significant Labs:    CBC/Anemia Profile:  Recent Labs   Lab 06/14/22  1831   HGB 7.8*   HCT 24.9*        Chemistries:  No results for input(s): NA, K, CL, CO2, BUN, CREATININE, CALCIUM, ALBUMIN, PROT, BILITOT, ALKPHOS, ALT, AST, GLUCOSE, MG, PHOS in the last 48 hours.    Recent Lab Results  (Last 5 results in the past 24 hours)        06/15/22  0552   06/15/22  0429   06/15/22  0004   06/14/22 2047   06/14/22  1831         Hematocrit         24.9       Hemoglobin         7.8       POC Glucose 98     111   116         Tobramycin, Random   0.7                                    Significant Imaging:  I have reviewed all pertinent imaging results/findings within the past 24 hours.    Assessment/Plan:     * Denice gangrene  - Stable   - wound vac     Chronic respiratory failure  This morning the big issue with secretions going to add SSKI despite being renal failure watch his potassium and were going to give him Mucomyst bronchodilators of his secretions are better will try to pull trach Thursday or Friday    Pressure ulcer of sacral region, stage 4  - Now with wound vac    ESRD (end stage renal disease)  - Started on HD 12/30   - Continue with HD per nephrology  - Now on epogen    Type 2 diabetes mellitus without complication, without long-term current use of insulin  - stable on reduced levemir dose    Acute blood loss anemia  Hemoglobin 7.8 receiving Epogen                     Jose Urban MD  Pulmonology  [facility]

## 2022-06-15 NOTE — PROGRESS NOTES
[facility]  Adult Nutrition  Follow-up Note         Reason for Assessment  Reason For Assessment: RD follow-up  Nutrition Risk Screen: tube feeding or parenteral nutrition  Malnutrition  Is Patient Malnourished: No  Nutrition Diagnosis  Swallowing difficulty   related to Chronic illness as evidenced by raúl gangrene  i  Nutrition Diagnosis Status: Chronic/ continues  Nutrition Risk  Level of Risk/Frequency of Follow-up: high   Chewing or Swallowing Difficulty?: Swallowing difficulty  Estimated/Assessed Needs  RMR ([place]-St. Jeor Equation): 1529.13 Activity Factor: 1 Injury Factor: 1.2   Total Ve: 7.8 mL Temp: (!) 100.4 °F (38 °C)Oral  Weight Used For Calorie Calculations: 78.3 kg (172 lb 9.9 oz)   Energy Need Method: [facility] Energy Calorie Requirements (kcal): 1608  Weight Used For Protein Calculations: 73.6 kg (162 lb 4.1 oz)  Protein Requirements:   Estimated Fluid Requirement Method: RDA Method Fluid Requirements (mL): 2105  RDA Method (mL): 1608     Nutrition Prescription / Recommendations  Recommendation/Intervention: Nepro@60ml with water at 50mlq 2 hours  Goals: weight stabilization, TF to meet needs  Nutrition Goal Status: progressing towards goal  Communication of RD Recs: discussed on rounds  Current Diet Order: Nepro @60ml/h, water flush 50mlq 4 hours  Nutrition Order Comments: PEG tube feeding:Nepro @ 60ml/hr; H20 flush of 50ml q 4hr  Current Nutrition Support Formula Ordered: Nepro  Current Nutrition Support Rate Ordered: 60 (ml)  Current Nutrition Support Frequency Ordered: hourly  Recommended Diet: Nepro@60  Recommended Oral Supplement: Matthew [90 kcals, 2.5g Protein, 10g Carbs(3g Sugar), 7g L-Arginine, 7g L-Glutamine, Vitamin C 300mg, 9.5mg Zinc] twice daily  Is Nutrition Support Recommended: No  Is Education Recommended: No  Monitor and Evaluation  % current Intake: Enteral Nutrition at goal  % intake to meet estimated needs: Enteral Nutrition   Food and Nutrient  "Intake: enteral nutrition intake  Food and Nutrient Adminstration: enteral and parenteral nutrition administration  Anthropometric Measurements: weight change, body mass index  Biochemical Data, Medical Tests and Procedures: electrolyte and renal panel, glucose/endocrine profile  Nutrition-Focused Physical Findings: skin    Current Medical Diagnosis and Past Medical History  Diagnosis: renal disease, gastrointestinal disease, infection/sepsis  Past Medical History:   Diagnosis Date    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    Hyperlipidemia     Hypertension     On mechanically assisted ventilation 12/14/2021    Pressure ulcer of left heel, unstageable      Nutrition/Diet History  Spiritual, Cultural Beliefs, Roman Catholic Practices, Values that Affect Care: no  Supplemental Drinks or Food Habits: Matthew, Nepro  Food Allergies: NKFA  Factors Affecting Nutritional Intake: None identified at this time  Lab/Procedures/Meds  Recent Labs   Lab 06/13/22  0521      K 3.6   BUN 58*   CREATININE 2.57*   *   CALCIUM 8.6        Last A1c: No results found for: HGBA1C  Lab Results   Component Value Date    RBC 2.09 (L) 06/13/2022    HGB 7.8 (L) 06/14/2022    HCT 24.9 (L) 06/14/2022    .4 (H) 06/13/2022    MCH 30.6 06/13/2022    MCHC 30.2 (L) 06/13/2022    TIBC 128 (L) 05/24/2022     Pertinent Labs Reviewed: reviewed  Pertinent Labs Comments: na 133, k 3.7, crea 2.85, GFR 24  Pertinent Medications Reviewed: reviewed  Pertinent Medications Comments: heparin, insulin  Anthropometrics  Temp: (!) 100.4 °F (38 °C)  Height Method: Stated  Height: 5' 11" (180.3 cm)  Height (inches): 71 in  Weight Method: Bed Scale  Weight: 73.2 kg (161 lb 6 oz)  Weight (lb): 161.38 lb  Ideal Body Weight (IBW), Male: 172 lb  % Ideal Body Weight, Male (lb): 94.34 %  BMI (Calculated): 22.5  BMI Grade: 18.5-24.9 - normal     Nutrition by Nursing  Diet/Nutrition Received: tube feeding  Intake (%): 100%  Diet/Feeding Assistance: total " feed  Diet/Feeding Tolerance: good  Last Bowel Movement: 06/14/22  [REMOVED]      NG/OG Tube [place] sump 18 Fr. Left nostril-Feeding Type: continuous, by pump       Gastrostomy/Enterostomy 03/09/22 1436 Percutaneous endoscopic gastrostomy (PEG) midline feeding-Feeding Type: continuous, by pump  [REMOVED]      NG/OG Tube [place] sump 18 Fr. Left nostril-Current Rate (mL/hr): 50 mL/hr       Gastrostomy/Enterostomy 03/09/22 1436 Percutaneous endoscopic gastrostomy (PEG) midline feeding-Current Rate (mL/hr): 60 mL/hr  [REMOVED]      NG/OG Tube [place] sump 18 Fr. Left nostril-Goal Rate (mL/hr): 75 mL/hr       Gastrostomy/Enterostomy 03/09/22 1436 Percutaneous endoscopic gastrostomy (PEG) midline feeding-Goal Rate (mL/hr): 60 mL/hr  [REMOVED]      NG/OG Tube [place] sump 18 Fr. Left nostril-Formula Name: nepro 1.8       Gastrostomy/Enterostomy 03/09/22 1436 Percutaneous endoscopic gastrostomy (PEG) midline feeding-Formula Name: Nepro  Nutrition Follow-Up  RD Follow-up?: (P) Yes  Assessment and Plan  No new Assessment & Plan notes have been filed under this hospital service since the last note was generated.  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    Hyperlipidemia     Hypertension     On mechanically assisted ventilation 12/14/2021    Pressure ulcer of left heel, unstageable      Nutrition/Diet History  Spiritual, Cultural Beliefs, Mormonism Practices, Values that Affect Care: no  Supplemental Drinks or Food Habits: Matthew, Nepro  Food Allergies: NKFA  Factors Affecting Nutritional Intake: None identified at this time  Lab/Procedures/Meds  Recent Labs   Lab 06/13/22  0521      K 3.6   BUN 58*   CREATININE 2.57*   *   CALCIUM 8.6        Last A1c: No results found for: HGBA1C  Lab Results   Component Value Date    RBC 2.09 (L)  "06/13/2022    HGB 7.8 (L) 06/14/2022    HCT 24.9 (L) 06/14/2022    .4 (H) 06/13/2022    MCH 30.6 06/13/2022    MCHC 30.2 (L) 06/13/2022    TIBC 128 (L) 05/24/2022     Pertinent Labs Reviewed: reviewed  Pertinent Labs Comments: na 133, k 3.7, crea 2.85, GFR 24  Pertinent Medications Reviewed: reviewed  Pertinent Medications Comments: heparin, insulin  Anthropometrics  Temp: (!) 100.4 °F (38 °C)  Height Method: Stated  Height: 5' 11" (180.3 cm)  Height (inches): 71 in  Weight Method: Bed Scale  Weight: 73.2 kg (161 lb 6 oz)  Weight (lb): 161.38 lb  Ideal Body Weight (IBW), Male: 172 lb  % Ideal Body Weight, Male (lb): 94.34 %  BMI (Calculated): 22.5  BMI Grade: 18.5-24.9 - normal     Nutrition by Nursing  Diet/Nutrition Received: tube feeding  Intake (%): 100%  Diet/Feeding Assistance: total feed  Diet/Feeding Tolerance: good  Last Bowel Movement: 06/14/22  [REMOVED]      NG/OG Tube [place] sump 18 Fr. Left nostril-Feeding Type: continuous, by pump       Gastrostomy/Enterostomy 03/09/22 1436 Percutaneous endoscopic gastrostomy (PEG) midline feeding-Feeding Type: continuous, by pump  [REMOVED]      NG/OG Tube [place] sump 18 Fr. Left nostril-Current Rate (mL/hr): 50 mL/hr       Gastrostomy/Enterostomy 03/09/22 1436 Percutaneous endoscopic gastrostomy (PEG) midline feeding-Current Rate (mL/hr): 60 mL/hr  [REMOVED]      NG/OG Tube [place] sump 18 Fr. Left nostril-Goal Rate (mL/hr): 75 mL/hr       Gastrostomy/Enterostomy 03/09/22 1436 Percutaneous endoscopic gastrostomy (PEG) midline feeding-Goal Rate (mL/hr): 60 mL/hr  [REMOVED]      NG/OG Tube [place] sump 18 Fr. Left nostril-Formula Name: nepro 1.8       Gastrostomy/Enterostomy 03/09/22 1436 Percutaneous endoscopic gastrostomy (PEG) midline feeding-Formula Name: Nepro  Nutrition Follow-Up  RD Follow-up?: (P) Yes  Assessment and Plan  No new Assessment & Plan notes have been filed under this hospital service since the last note was generated.  Service: Nutrition      "

## 2022-06-15 NOTE — NURSING
Recd patient resting in bed with eyes closed. Nadn. resp even and unlabored. Safety measures intact. Will continue to monitor.   Smoking Cessation

## 2022-06-16 NOTE — PLAN OF CARE
Per IDT meeting pt doing well with trach being capped and may be able to remove this week. SS will work on discharge planning once removed.

## 2022-06-16 NOTE — PROGRESS NOTES
[facility]  Pulmonology  Progress Note    Patient Name: Og Garcia  MRN: [id number]  Admission Date: 12/23/2021  Hospital Length of Stay: 175 days  Code Status: Prior  Attending Provider: Cecil Abernathy DO  Primary Care Provider: Hector Arndt DNP, FNP-C   Principal Problem: Denice gangrene    Subjective:     Interval History:  Patient without complaints    Objective:     Vital Signs (Most Recent):  Temp: 98.5 °F (36.9 °C) (06/16/22 0300)  Pulse: 106 (06/16/22 0445)  Resp: (!) 26 (06/16/22 0445)  BP: (!) 106/55 (06/16/22 0445)  SpO2: 97 % (06/16/22 0445)   Vital Signs (24h Range):  Temp:  [98.1 °F (36.7 °C)-101 °F (38.3 °C)] 98.5 °F (36.9 °C)  Pulse:  [] 106  Resp:  [24-49] 26  SpO2:  [92 %-100 %] 97 %  BP: ()/(11-69) 106/55     Weight: 73.2 kg (161 lb 6 oz)  Body mass index is 22.51 kg/m².      Intake/Output Summary (Last 24 hours) at 6/16/2022 0534  Last data filed at 6/15/2022 1605  Gross per 24 hour   Intake 650 ml   Output 2000 ml   Net -1350 ml       Physical Exam  Vitals reviewed.   Constitutional:       Appearance: Normal appearance.      Interventions: He is not intubated.  HENT:      Head: Normocephalic and atraumatic.      Nose: Nose normal.      Mouth/Throat:      Mouth: Mucous membranes are dry.      Pharynx: Oropharynx is clear.   Eyes:      Extraocular Movements: Extraocular movements intact.      Conjunctiva/sclera: Conjunctivae normal.      Pupils: Pupils are equal, round, and reactive to light.   Cardiovascular:      Rate and Rhythm: Normal rate.      Heart sounds: Normal heart sounds. No murmur heard.  Pulmonary:      Effort: Pulmonary effort is normal. He is not intubated.      Breath sounds: Normal breath sounds.   Abdominal:      General: Abdomen is flat. Bowel sounds are normal.      Palpations: Abdomen is soft.   Musculoskeletal:         General: Normal range of motion.      Cervical back: Normal range of motion and neck supple.      Right  lower leg: No edema.      Left lower leg: No edema.   Skin:     General: Skin is warm and dry.      Capillary Refill: Capillary refill takes less than 2 seconds.   Neurological:      General: No focal deficit present.      Mental Status: He is alert and oriented to person, place, and time.   Psychiatric:         Mood and Affect: Mood normal.         Behavior: Behavior normal.       Vents:  Vent Mode: Standby (05/28/22 0415)  Ventilator Initiated: No (04/23/22 1934)  Set Rate: 0 BPM (05/18/22 0516)  Vt Set: 0 mL (05/18/22 0516)  Pressure Support: 15 cmH20 (05/22/22 0044)  PEEP/CPAP: 5 cmH20 (05/22/22 0044)  Oxygen Concentration (%): 28 (06/15/22 1329)  Peak Airway Pressure: 21 cmH2O (05/22/22 0044)  Plateau Pressure: 20 cmH20 (03/07/22 0500)  Total Ve: 7.8 mL (05/22/22 0044)  F/VT Ratio<105 (RSBI): (!) 22.64 (05/22/22 0044)    Lines/Drains/Airways       Central Venous Catheter Line  Duration                  Hemodialysis Catheter 12/30/21 0900 right internal jugular 167 days              Drain  Duration                  Colostomy 12/18/21 1030 Descending/sigmoid  days         Gastrostomy/Enterostomy 03/09/22 1436 Percutaneous endoscopic gastrostomy (PEG) midline feeding 98 days              Airway  Duration                  Surgical Airway 04/27/22 0856 Shiley;Other (Comment) Cuffed;Long 49 days              Peripheral Intravenous Line  Duration                  Peripheral IV - Single Lumen 06/03/22 1527 18 G Anterior;Right Forearm 12 days                    Significant Labs:    CBC/Anemia Profile:  Recent Labs   Lab 06/14/22  1831   HGB 7.8*   HCT 24.9*        Chemistries:  No results for input(s): NA, K, CL, CO2, BUN, CREATININE, CALCIUM, ALBUMIN, PROT, BILITOT, ALKPHOS, ALT, AST, GLUCOSE, MG, PHOS in the last 48 hours.    Recent Lab Results         06/16/22  0020   06/15/22  1617   06/15/22  1148   06/15/22  0552        POC Glucose 114   144   133   98               Significant Imaging:  I have reviewed all  pertinent imaging results/findings within the past 24 hours.    Assessment/Plan:     * Denice gangrene  - Stable   - wound vac     Chronic respiratory failure  This morning the big issue with secretions going to add SSKI despite being renal failure watch his potassium and were going to give him Mucomyst bronchodilators of his secretions are better will try to pull trach Thursday or Friday    Hypertension  Blood pressure looks okay continue medicines          ESRD (end stage renal disease)  - Started on HD 12/30   - Continue with HD per nephrology  - Now on epogen                 Jose Urban MD  Pulmonology  [facility]

## 2022-06-16 NOTE — SUBJECTIVE & OBJECTIVE
Interval History: The patient is resting.  No acute changes.  He did have to be suctioned earlier today.    Review of patient's allergies indicates:  No Known Allergies  Current Facility-Administered Medications   Medication Frequency    0.9%  NaCl infusion (for blood administration) Q24H PRN    0.9%  NaCl infusion PRN    acetaminophen tablet 500 mg Q6H PRN    acetylcysteine 200 mg/ml (20%) solution 2 mL TID    albuterol nebulizer solution 2.5 mg Q4H PRN    albuterol-ipratropium 2.5 mg-0.5 mg/3 mL nebulizer solution 3 mL Q6H    bisacodyL EC tablet 10 mg Daily PRN    collagenase ointment Daily PRN    dextrose 50% injection 12.5 g PRN    diltiaZEM tablet 90 mg Q6H    epoetin beth-epbx injection 10,000 Units Every Mon, Wed, Fri    glucagon (human recombinant) injection 1 mg PRN    guaiFENesin 100 mg/5 ml syrup 200 mg Q6H    heparin (porcine) injection 4,000 Units PRN    heparin (porcine) injection 5,000 Units Q8H    insulin aspart U-100 injection 1-10 Units Q6H    insulin detemir U-100 injection 15 Units QHS    linezolid tablet 600 mg Q12H    meropenem (MERREM) 500 mg in sodium chloride 0.9 % 100 mL IVPB (MB+) Q24H    metoprolol tartrate (LOPRESSOR) split tablet 12.5 mg BID    pantoprazole suspension 40 mg Daily    potassium iodide 1 gram/mL solution 5 drop QID    sevelamer carbonate pwpk 0.8 g TID WM    simethicone chewable tablet 80 mg TID PRN    sodium chloride 0.9% bolus 250 mL PRN    tobramycin - pharmacy to dose pharmacy to manage frequency       Objective:     Vital Signs (Most Recent):  Temp: 97.9 °F (36.6 °C) (06/16/22 0700)  Pulse: 109 (06/16/22 1217)  Resp: (!) 42 (06/16/22 1217)  BP: (!) 100/57 (06/16/22 1200)  SpO2: 99 % (06/16/22 1217)  O2 Device (Oxygen Therapy): nasal cannula w/ humidification (06/16/22 0810)   Vital Signs (24h Range):  Temp:  [97.9 °F (36.6 °C)-101 °F (38.3 °C)] 97.9 °F (36.6 °C)  Pulse:  [] 109  Resp:  [24-54] 42  SpO2:  [92 %-100 %] 99 %  BP: ()/(40-69) 100/57     Weight:  73.2 kg (161 lb 6 oz) (06/14/22 0610)  Body mass index is 22.51 kg/m².  Body surface area is 1.91 meters squared.    I/O last 3 completed shifts:  In: 710 [Other:500; NG/GT:210]  Out: 2000 [Other:2000]    Physical Exam  Vitals reviewed.   HENT:      Head: Normocephalic.   Pulmonary:      Effort: Pulmonary effort is normal.      Breath sounds: Rales present.   Abdominal:      Palpations: Abdomen is soft.   Neurological:      Mental Status: He is alert.       Significant Labs:  BMP:   Recent Labs   Lab 06/13/22  0521   *      K 3.6      CO2 25   BUN 58*   CREATININE 2.57*   CALCIUM 8.6     CBC:   Recent Labs   Lab 06/13/22  0521 06/14/22  1831   WBC 13.21*  --    RBC 2.09*  --    HGB 6.4* 7.8*   HCT 21.2* 24.9*   *  --    .4*  --    MCH 30.6  --    MCHC 30.2*  --         Significant Imaging:  Labs: Reviewed

## 2022-06-16 NOTE — PROGRESS NOTES
[facility]  Nephrology  Progress Note    Patient Name: Og Garcia  MRN: [id number]  Admission Date: 12/23/2021  Hospital Length of Stay: 175 days  Attending Provider: Cecil Abernathy DO   Primary Care Physician: Hector Arndt DNP, FNP-C  Principal Problem:Denice gangrene    Subjective:     HPI: The patient is known from the previous nephrology consult at [facility].  He is no at Specialty and continues to need dialysis support.      Interval History: The patient is resting.  No acute changes.  He did have to be suctioned earlier today.    Review of patient's allergies indicates:  No Known Allergies  Current Facility-Administered Medications   Medication Frequency    0.9%  NaCl infusion (for blood administration) Q24H PRN    0.9%  NaCl infusion PRN    acetaminophen tablet 500 mg Q6H PRN    acetylcysteine 200 mg/ml (20%) solution 2 mL TID    albuterol nebulizer solution 2.5 mg Q4H PRN    albuterol-ipratropium 2.5 mg-0.5 mg/3 mL nebulizer solution 3 mL Q6H    bisacodyL EC tablet 10 mg Daily PRN    collagenase ointment Daily PRN    dextrose 50% injection 12.5 g PRN    diltiaZEM tablet 90 mg Q6H    epoetin beth-epbx injection 10,000 Units Every Mon, Wed, Fri    glucagon (human recombinant) injection 1 mg PRN    guaiFENesin 100 mg/5 ml syrup 200 mg Q6H    heparin (porcine) injection 4,000 Units PRN    heparin (porcine) injection 5,000 Units Q8H    insulin aspart U-100 injection 1-10 Units Q6H    insulin detemir U-100 injection 15 Units QHS    linezolid tablet 600 mg Q12H    meropenem (MERREM) 500 mg in sodium chloride 0.9 % 100 mL IVPB (MB+) Q24H    metoprolol tartrate (LOPRESSOR) split tablet 12.5 mg BID    pantoprazole suspension 40 mg Daily    potassium iodide 1 gram/mL solution 5 drop QID    sevelamer carbonate pwpk 0.8 g TID WM    simethicone chewable tablet 80 mg TID PRN    sodium chloride 0.9% bolus 250 mL PRN    tobramycin - pharmacy to dose pharmacy to  manage frequency       Objective:     Vital Signs (Most Recent):  Temp: 97.9 °F (36.6 °C) (06/16/22 0700)  Pulse: 109 (06/16/22 1217)  Resp: (!) 42 (06/16/22 1217)  BP: (!) 100/57 (06/16/22 1200)  SpO2: 99 % (06/16/22 1217)  O2 Device (Oxygen Therapy): nasal cannula w/ humidification (06/16/22 0810)   Vital Signs (24h Range):  Temp:  [97.9 °F (36.6 °C)-101 °F (38.3 °C)] 97.9 °F (36.6 °C)  Pulse:  [] 109  Resp:  [24-54] 42  SpO2:  [92 %-100 %] 99 %  BP: ()/(40-69) 100/57     Weight: 73.2 kg (161 lb 6 oz) (06/14/22 0610)  Body mass index is 22.51 kg/m².  Body surface area is 1.91 meters squared.    I/O last 3 completed shifts:  In: 710 [Other:500; NG/GT:210]  Out: 2000 [Other:2000]    Physical Exam  Vitals reviewed.   HENT:      Head: Normocephalic.   Pulmonary:      Effort: Pulmonary effort is normal.      Breath sounds: Rales present.   Abdominal:      Palpations: Abdomen is soft.   Neurological:      Mental Status: He is alert.       Significant Labs:  BMP:   Recent Labs   Lab 06/13/22  0521   *      K 3.6      CO2 25   BUN 58*   CREATININE 2.57*   CALCIUM 8.6     CBC:   Recent Labs   Lab 06/13/22  0521 06/14/22  1831   WBC 13.21*  --    RBC 2.09*  --    HGB 6.4* 7.8*   HCT 21.2* 24.9*   *  --    .4*  --    MCH 30.6  --    MCHC 30.2*  --         Significant Imaging:  Labs: Reviewed    Assessment/Plan:     ESRD (end stage renal disease)    6/13/2022 Dialysis today.  6/14/2022 Continue with dialysis for this patient.  6/16/2022  Continue with scheduled hemodialysis for this patient.    Debility    Thank you for your consult. I will follow-up with patient. Please contact us if you have any additional questions.    Edwin Pryor Jr, MD  Nephrology  [facility]

## 2022-06-16 NOTE — ASSESSMENT & PLAN NOTE
6/13/2022 Dialysis today.  6/14/2022 Continue with dialysis for this patient.  6/16/2022  Continue with scheduled hemodialysis for this patient.

## 2022-06-16 NOTE — SUBJECTIVE & OBJECTIVE
Interval History:  Patient without complaints    Objective:     Vital Signs (Most Recent):  Temp: 98.5 °F (36.9 °C) (06/16/22 0300)  Pulse: 106 (06/16/22 0445)  Resp: (!) 26 (06/16/22 0445)  BP: (!) 106/55 (06/16/22 0445)  SpO2: 97 % (06/16/22 0445)   Vital Signs (24h Range):  Temp:  [98.1 °F (36.7 °C)-101 °F (38.3 °C)] 98.5 °F (36.9 °C)  Pulse:  [] 106  Resp:  [24-49] 26  SpO2:  [92 %-100 %] 97 %  BP: ()/(11-69) 106/55     Weight: 73.2 kg (161 lb 6 oz)  Body mass index is 22.51 kg/m².      Intake/Output Summary (Last 24 hours) at 6/16/2022 0534  Last data filed at 6/15/2022 1605  Gross per 24 hour   Intake 650 ml   Output 2000 ml   Net -1350 ml       Physical Exam  Vitals reviewed.   Constitutional:       Appearance: Normal appearance.      Interventions: He is not intubated.  HENT:      Head: Normocephalic and atraumatic.      Nose: Nose normal.      Mouth/Throat:      Mouth: Mucous membranes are dry.      Pharynx: Oropharynx is clear.   Eyes:      Extraocular Movements: Extraocular movements intact.      Conjunctiva/sclera: Conjunctivae normal.      Pupils: Pupils are equal, round, and reactive to light.   Cardiovascular:      Rate and Rhythm: Normal rate.      Heart sounds: Normal heart sounds. No murmur heard.  Pulmonary:      Effort: Pulmonary effort is normal. He is not intubated.      Breath sounds: Normal breath sounds.   Abdominal:      General: Abdomen is flat. Bowel sounds are normal.      Palpations: Abdomen is soft.   Musculoskeletal:         General: Normal range of motion.      Cervical back: Normal range of motion and neck supple.      Right lower leg: No edema.      Left lower leg: No edema.   Skin:     General: Skin is warm and dry.      Capillary Refill: Capillary refill takes less than 2 seconds.   Neurological:      General: No focal deficit present.      Mental Status: He is alert and oriented to person, place, and time.   Psychiatric:         Mood and Affect: Mood normal.          Behavior: Behavior normal.       Vents:  Vent Mode: Standby (05/28/22 0415)  Ventilator Initiated: No (04/23/22 1934)  Set Rate: 0 BPM (05/18/22 0516)  Vt Set: 0 mL (05/18/22 0516)  Pressure Support: 15 cmH20 (05/22/22 0044)  PEEP/CPAP: 5 cmH20 (05/22/22 0044)  Oxygen Concentration (%): 28 (06/15/22 1329)  Peak Airway Pressure: 21 cmH2O (05/22/22 0044)  Plateau Pressure: 20 cmH20 (03/07/22 0500)  Total Ve: 7.8 mL (05/22/22 0044)  F/VT Ratio<105 (RSBI): (!) 22.64 (05/22/22 0044)    Lines/Drains/Airways       Central Venous Catheter Line  Duration                  Hemodialysis Catheter 12/30/21 0900 right internal jugular 167 days              Drain  Duration                  Colostomy 12/18/21 1030 Descending/sigmoid  days         Gastrostomy/Enterostomy 03/09/22 1436 Percutaneous endoscopic gastrostomy (PEG) midline feeding 98 days              Airway  Duration                  Surgical Airway 04/27/22 0856 Shiley;Other (Comment) Cuffed;Long 49 days              Peripheral Intravenous Line  Duration                  Peripheral IV - Single Lumen 06/03/22 1527 18 G Anterior;Right Forearm 12 days                    Significant Labs:    CBC/Anemia Profile:  Recent Labs   Lab 06/14/22  1831   HGB 7.8*   HCT 24.9*        Chemistries:  No results for input(s): NA, K, CL, CO2, BUN, CREATININE, CALCIUM, ALBUMIN, PROT, BILITOT, ALKPHOS, ALT, AST, GLUCOSE, MG, PHOS in the last 48 hours.    Recent Lab Results         06/16/22  0020   06/15/22  1617   06/15/22  1148   06/15/22  0552        POC Glucose 114   144   133   98               Significant Imaging:  I have reviewed all pertinent imaging results/findings within the past 24 hours.

## 2022-06-17 NOTE — ASSESSMENT & PLAN NOTE
Continue current treatment  Chronic condition  Glucoses are acceptable.  6/13/2022 Condition is stable.  6/17/2022  Stable.

## 2022-06-17 NOTE — PROGRESS NOTES
[facility]  Nephrology  Progress Note    Patient Name: Og Garcia  MRN: [id number]  Admission Date: 12/23/2021  Hospital Length of Stay: 176 days  Attending Provider: Cecil Abernathy DO   Primary Care Physician: Hector Arndt DNP, FNP-C  Principal Problem:Denice gangrene    Subjective:     HPI: The patient is known from the previous nephrology consult at [facility].  He is no at Specialty and continues to need dialysis support.      Interval History: The patient is resting.  No acute changes.  No fevers or chills.    Review of patient's allergies indicates:  No Known Allergies  Current Facility-Administered Medications   Medication Frequency    0.9%  NaCl infusion (for blood administration) Q24H PRN    0.9%  NaCl infusion PRN    acetaminophen tablet 500 mg Q6H PRN    acetylcysteine 200 mg/ml (20%) solution 2 mL TID    albuterol nebulizer solution 2.5 mg Q4H PRN    albuterol-ipratropium 2.5 mg-0.5 mg/3 mL nebulizer solution 3 mL Q6H    bisacodyL EC tablet 10 mg Daily PRN    collagenase ointment Daily PRN    dextrose 50% injection 12.5 g PRN    diltiaZEM tablet 90 mg Q6H    epoetin beth-epbx injection 10,000 Units Every Mon, Wed, Fri    glucagon (human recombinant) injection 1 mg PRN    guaiFENesin 100 mg/5 ml syrup 200 mg Q6H    heparin (porcine) injection 4,000 Units PRN    heparin (porcine) injection 5,000 Units Q8H    insulin aspart U-100 injection 1-10 Units Q6H    insulin detemir U-100 injection 15 Units QHS    linezolid tablet 600 mg Q12H    meropenem (MERREM) 500 mg in sodium chloride 0.9 % 100 mL IVPB (MB+) Q24H    metoprolol tartrate (LOPRESSOR) split tablet 12.5 mg BID    pantoprazole suspension 40 mg Daily    potassium iodide 1 gram/mL solution 5 drop QID    sevelamer carbonate pwpk 0.8 g TID WM    simethicone chewable tablet 80 mg TID PRN    sodium chloride 0.9% bolus 250 mL PRN    tobramycin - pharmacy to dose pharmacy to manage frequency        Objective:     Vital Signs (Most Recent):  Temp: 98.8 °F (37.1 °C) (06/17/22 1100)  Pulse: (!) 114 (06/17/22 1200)  Resp: (!) 44 (06/17/22 1200)  BP: 96/61 (06/17/22 1200)  SpO2: 100 % (06/17/22 1200)  O2 Device (Oxygen Therapy): nasal cannula (06/17/22 1100)   Vital Signs (24h Range):  Temp:  [98.2 °F (36.8 °C)-100.6 °F (38.1 °C)] 98.8 °F (37.1 °C)  Pulse:  [101-126] 114  Resp:  [26-51] 44  SpO2:  [92 %-100 %] 100 %  BP: ()/(46-66) 96/61     Weight: 75.1 kg (165 lb 9.1 oz) (06/17/22 0500)  Body mass index is 23.09 kg/m².  Body surface area is 1.94 meters squared.    I/O last 3 completed shifts:  In: 3420 [P.O.:120; Other:440; NG/GT:2760; IV Piggyback:100]  Out: 200 [Stool:200]    Physical Exam  Vitals reviewed.   HENT:      Head: Normocephalic.   Cardiovascular:      Rate and Rhythm: Regular rhythm.      Pulses: Normal pulses.   Pulmonary:      Effort: Pulmonary effort is normal.   Abdominal:      Palpations: Abdomen is soft.   Neurological:      Mental Status: He is alert.       Significant Labs:  BMP:   Recent Labs   Lab 06/17/22  0536   GLU 89      K 4.6      CO2 26   BUN 72*   CREATININE 2.46*   CALCIUM 9.0     CBC:   Recent Labs   Lab 06/17/22  0536   WBC 17.62*   RBC 2.60*   HGB 8.1*   HCT 24.8*   *   MCV 95.4   MCH 31.2*   MCHC 32.7        Significant Imaging:  Labs: Reviewed    Assessment/Plan:     ESRD (end stage renal disease)    6/13/2022 Dialysis today.  6/14/2022 Continue with dialysis for this patient.  6/16/2022  Continue with scheduled hemodialysis for this patient.  6/17/2022  Dialysis as scheduled.    Type 2 diabetes mellitus without complication, without long-term current use of insulin  Continue current treatment  Chronic condition  Glucoses are acceptable.  6/13/2022 Condition is stable.  6/17/2022  Stable.        Thank you for your consult. I will follow-up with patient. Please contact us if you have any additional questions.    Edwin Pryor Jr,  MD  Nephrology  [facility]

## 2022-06-17 NOTE — SUBJECTIVE & OBJECTIVE
Interval History: The patient is resting.  No acute changes.  No fevers or chills.    Review of patient's allergies indicates:  No Known Allergies  Current Facility-Administered Medications   Medication Frequency    0.9%  NaCl infusion (for blood administration) Q24H PRN    0.9%  NaCl infusion PRN    acetaminophen tablet 500 mg Q6H PRN    acetylcysteine 200 mg/ml (20%) solution 2 mL TID    albuterol nebulizer solution 2.5 mg Q4H PRN    albuterol-ipratropium 2.5 mg-0.5 mg/3 mL nebulizer solution 3 mL Q6H    bisacodyL EC tablet 10 mg Daily PRN    collagenase ointment Daily PRN    dextrose 50% injection 12.5 g PRN    diltiaZEM tablet 90 mg Q6H    epoetin beth-epbx injection 10,000 Units Every Mon, Wed, Fri    glucagon (human recombinant) injection 1 mg PRN    guaiFENesin 100 mg/5 ml syrup 200 mg Q6H    heparin (porcine) injection 4,000 Units PRN    heparin (porcine) injection 5,000 Units Q8H    insulin aspart U-100 injection 1-10 Units Q6H    insulin detemir U-100 injection 15 Units QHS    linezolid tablet 600 mg Q12H    meropenem (MERREM) 500 mg in sodium chloride 0.9 % 100 mL IVPB (MB+) Q24H    metoprolol tartrate (LOPRESSOR) split tablet 12.5 mg BID    pantoprazole suspension 40 mg Daily    potassium iodide 1 gram/mL solution 5 drop QID    sevelamer carbonate pwpk 0.8 g TID WM    simethicone chewable tablet 80 mg TID PRN    sodium chloride 0.9% bolus 250 mL PRN    tobramycin - pharmacy to dose pharmacy to manage frequency       Objective:     Vital Signs (Most Recent):  Temp: 98.8 °F (37.1 °C) (06/17/22 1100)  Pulse: (!) 114 (06/17/22 1200)  Resp: (!) 44 (06/17/22 1200)  BP: 96/61 (06/17/22 1200)  SpO2: 100 % (06/17/22 1200)  O2 Device (Oxygen Therapy): nasal cannula (06/17/22 1100)   Vital Signs (24h Range):  Temp:  [98.2 °F (36.8 °C)-100.6 °F (38.1 °C)] 98.8 °F (37.1 °C)  Pulse:  [101-126] 114  Resp:  [26-51] 44  SpO2:  [92 %-100 %] 100 %  BP: ()/(46-66) 96/61     Weight: 75.1 kg (165 lb 9.1 oz) (06/17/22  0500)  Body mass index is 23.09 kg/m².  Body surface area is 1.94 meters squared.    I/O last 3 completed shifts:  In: 3420 [P.O.:120; Other:440; NG/GT:2760; IV Piggyback:100]  Out: 200 [Stool:200]    Physical Exam  Vitals reviewed.   HENT:      Head: Normocephalic.   Cardiovascular:      Rate and Rhythm: Regular rhythm.      Pulses: Normal pulses.   Pulmonary:      Effort: Pulmonary effort is normal.   Abdominal:      Palpations: Abdomen is soft.   Neurological:      Mental Status: He is alert.       Significant Labs:  BMP:   Recent Labs   Lab 06/17/22  0536   GLU 89      K 4.6      CO2 26   BUN 72*   CREATININE 2.46*   CALCIUM 9.0     CBC:   Recent Labs   Lab 06/17/22  0536   WBC 17.62*   RBC 2.60*   HGB 8.1*   HCT 24.8*   *   MCV 95.4   MCH 31.2*   MCHC 32.7        Significant Imaging:  Labs: Reviewed

## 2022-06-17 NOTE — ASSESSMENT & PLAN NOTE
- Continue IV antibiotics per ID recommendations - stop date 07/01   - He has been treated for multidrug resistant bacteria     ID note reviewed and appreciated:  1. Continue tobramycin and meropenem, both renally dosed, until 7/1  2. 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 Monitor temperature curve and white blood cell count     back to OR for debridement  6/10

## 2022-06-17 NOTE — DIALYSIS ROUNDING
Mr. Garcia is seen during his hemodialysis. He is resting and has eyes closed. Overall little changed. We'll continue with his hemodialysis as scheduled.

## 2022-06-17 NOTE — ASSESSMENT & PLAN NOTE
He is now doing well with nasal cannula  Trach capped  Still having some issues with secretions at times  Will hold off on pulling trach for now  Reassess on Monday

## 2022-06-17 NOTE — ASSESSMENT & PLAN NOTE
6/13/2022 Dialysis today.  6/14/2022 Continue with dialysis for this patient.  6/16/2022  Continue with scheduled hemodialysis for this patient.  6/17/2022  Dialysis as scheduled.

## 2022-06-17 NOTE — PROGRESS NOTES
[facility]  Pulmonology  Progress Note    Patient Name: Og Garcia  MRN: [id number]  Admission Date: 12/23/2021  Hospital Length of Stay: 176 days  Code Status: Prior  Attending Provider: Cecil Abernathy DO  Primary Care Provider: Hector Arndt DNP, FNP-C   Principal Problem: Denice gangrene    Subjective:     Interval History: No acute events overnight. He is currently resting comfortably. Oxygenating adequately on 2L nasal cannula. He is afebrile this am and vital signs are stable.     Objective:     Vital Signs (Most Recent):  Temp: (!) 100.6 °F (38.1 °C) (06/17/22 0700)  Pulse: (!) 113 (06/17/22 0830)  Resp: (!) 38 (06/17/22 0830)  BP: (!) 96/56 (06/17/22 0815)  SpO2: 100 % (06/17/22 0830)   Vital Signs (24h Range):  Temp:  [98.2 °F (36.8 °C)-100.6 °F (38.1 °C)] 100.6 °F (38.1 °C)  Pulse:  [104-126] 113  Resp:  [26-51] 38  SpO2:  [92 %-100 %] 100 %  BP: ()/(46-66) 96/56     Weight: 75.1 kg (165 lb 9.1 oz)  Body mass index is 23.09 kg/m².      Intake/Output Summary (Last 24 hours) at 6/17/2022 0938  Last data filed at 6/17/2022 0800  Gross per 24 hour   Intake 3210 ml   Output 200 ml   Net 3010 ml         Physical Exam  Vitals reviewed.   Constitutional:       General: He is not in acute distress.     Comments: Chronically ill appearing   HENT:      Right Ear: External ear normal.      Left Ear: External ear normal.      Mouth/Throat:      Mouth: Mucous membranes are moist.   Eyes:      Conjunctiva/sclera: Conjunctivae normal.   Neck:      Trachea: Tracheostomy present.   Cardiovascular:      Rate and Rhythm: Normal rate and regular rhythm.   Pulmonary:      Effort: Pulmonary effort is normal.      Breath sounds: Normal breath sounds.   Abdominal:      General: Bowel sounds are normal.      Palpations: Abdomen is soft.   Musculoskeletal:         General: Normal range of motion.      Cervical back: Normal range of motion and neck supple.   Skin:     General: Skin is warm  and dry.      Capillary Refill: Capillary refill takes less than 2 seconds.   Neurological:      Mental Status: He is alert. Mental status is at baseline.   Psychiatric:         Mood and Affect: Mood normal.       Vents:  Vent Mode: Standby (05/28/22 0415)  Ventilator Initiated: No (04/23/22 1934)  Set Rate: 0 BPM (05/18/22 0516)  Vt Set: 0 mL (05/18/22 0516)  Pressure Support: 15 cmH20 (05/22/22 0044)  PEEP/CPAP: 5 cmH20 (05/22/22 0044)  Oxygen Concentration (%): 28 (06/16/22 1520)  Peak Airway Pressure: 21 cmH2O (05/22/22 0044)  Plateau Pressure: 20 cmH20 (03/07/22 0500)  Total Ve: 7.8 mL (05/22/22 0044)  F/VT Ratio<105 (RSBI): (!) 22.64 (05/22/22 0044)    Lines/Drains/Airways       Central Venous Catheter Line  Duration                  Hemodialysis Catheter 12/30/21 0900 right internal jugular 168 days              Drain  Duration                  Colostomy 12/18/21 1030 Descending/sigmoid  days         Gastrostomy/Enterostomy 03/09/22 1436 Percutaneous endoscopic gastrostomy (PEG) midline feeding 99 days              Airway  Duration                  Surgical Airway 04/27/22 0856 Elizabethley;Other (Comment) Cuffed;Long 51 days              Peripheral Intravenous Line  Duration                  Peripheral IV - Single Lumen 06/03/22 1527 18 G Anterior;Right Forearm 13 days                    Significant Labs:    CBC/Anemia Profile:  Recent Labs   Lab 06/17/22  0536   WBC 17.62*   HGB 8.1*   HCT 24.8*   *   MCV 95.4   RDW 15.6*          Chemistries:  Recent Labs   Lab 06/17/22  0536      K 4.6      CO2 26   BUN 72*   CREATININE 2.46*   CALCIUM 9.0         All pertinent labs within the past 24 hours have been reviewed.    Significant Imaging:  I have reviewed all pertinent imaging results/findings within the past 24 hours.    Assessment/Plan:     * Denice gangrene  - Stable   - wound vac     Osteomyelitis  - Continue IV antibiotics per ID recommendations - stop date 07/01   - He has been  treated for multidrug resistant bacteria     ID note reviewed and appreciated:  1. Continue tobramycin and meropenem, both renally dosed, until 7/1  2. 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 Monitor temperature curve and white blood cell count     back to OR for debridement  6/10    Chronic respiratory failure  He is now doing well with nasal cannula  Trach capped  Still having some issues with secretions at times  Will hold off on pulling trach for now  Reassess on Monday    Pressure ulcer of sacral region, stage 4  - Continue with wound care    ESRD (end stage renal disease)  - Started on HD 12/30   - Continue with HD per nephrology  - Now on epogen  - HD today    Type 2 diabetes mellitus without complication, without long-term current use of insulin  - accuchecks look good  - on levemir 15 units and sliding scale    Hypertension  Blood pressure looks okay   He is on cardizem and metoprolol (mainly for rate)                     Cecil Abernathy, DO  Pulmonology  [facility]

## 2022-06-17 NOTE — SUBJECTIVE & OBJECTIVE
Interval History: No acute events overnight. He is currently resting comfortably. Oxygenating adequately on 2L nasal cannula. He is afebrile this am and vital signs are stable.     Objective:     Vital Signs (Most Recent):  Temp: (!) 100.6 °F (38.1 °C) (06/17/22 0700)  Pulse: (!) 113 (06/17/22 0830)  Resp: (!) 38 (06/17/22 0830)  BP: (!) 96/56 (06/17/22 0815)  SpO2: 100 % (06/17/22 0830)   Vital Signs (24h Range):  Temp:  [98.2 °F (36.8 °C)-100.6 °F (38.1 °C)] 100.6 °F (38.1 °C)  Pulse:  [104-126] 113  Resp:  [26-51] 38  SpO2:  [92 %-100 %] 100 %  BP: ()/(46-66) 96/56     Weight: 75.1 kg (165 lb 9.1 oz)  Body mass index is 23.09 kg/m².      Intake/Output Summary (Last 24 hours) at 6/17/2022 0938  Last data filed at 6/17/2022 0800  Gross per 24 hour   Intake 3210 ml   Output 200 ml   Net 3010 ml         Physical Exam  Vitals reviewed.   Constitutional:       General: He is not in acute distress.     Comments: Chronically ill appearing   HENT:      Right Ear: External ear normal.      Left Ear: External ear normal.      Mouth/Throat:      Mouth: Mucous membranes are moist.   Eyes:      Conjunctiva/sclera: Conjunctivae normal.   Neck:      Trachea: Tracheostomy present.   Cardiovascular:      Rate and Rhythm: Normal rate and regular rhythm.   Pulmonary:      Effort: Pulmonary effort is normal.      Breath sounds: Normal breath sounds.   Abdominal:      General: Bowel sounds are normal.      Palpations: Abdomen is soft.   Musculoskeletal:         General: Normal range of motion.      Cervical back: Normal range of motion and neck supple.   Skin:     General: Skin is warm and dry.      Capillary Refill: Capillary refill takes less than 2 seconds.   Neurological:      Mental Status: He is alert. Mental status is at baseline.   Psychiatric:         Mood and Affect: Mood normal.       Vents:  Vent Mode: Standby (05/28/22 3825)  Ventilator Initiated: No (04/23/22 1934)  Set Rate: 0 BPM (05/18/22 0516)  Vt Set: 0 mL  (05/18/22 0516)  Pressure Support: 15 cmH20 (05/22/22 0044)  PEEP/CPAP: 5 cmH20 (05/22/22 0044)  Oxygen Concentration (%): 28 (06/16/22 1520)  Peak Airway Pressure: 21 cmH2O (05/22/22 0044)  Plateau Pressure: 20 cmH20 (03/07/22 0500)  Total Ve: 7.8 mL (05/22/22 0044)  F/VT Ratio<105 (RSBI): (!) 22.64 (05/22/22 0044)    Lines/Drains/Airways       Central Venous Catheter Line  Duration                  Hemodialysis Catheter 12/30/21 0900 right internal jugular 168 days              Drain  Duration                  Colostomy 12/18/21 1030 Descending/sigmoid  days         Gastrostomy/Enterostomy 03/09/22 1436 Percutaneous endoscopic gastrostomy (PEG) midline feeding 99 days              Airway  Duration                  Surgical Airway 04/27/22 0856 Shiley;Other (Comment) Cuffed;Long 51 days              Peripheral Intravenous Line  Duration                  Peripheral IV - Single Lumen 06/03/22 1527 18 G Anterior;Right Forearm 13 days                    Significant Labs:    CBC/Anemia Profile:  Recent Labs   Lab 06/17/22  0536   WBC 17.62*   HGB 8.1*   HCT 24.8*   *   MCV 95.4   RDW 15.6*          Chemistries:  Recent Labs   Lab 06/17/22  0536      K 4.6      CO2 26   BUN 72*   CREATININE 2.46*   CALCIUM 9.0         All pertinent labs within the past 24 hours have been reviewed.    Significant Imaging:  I have reviewed all pertinent imaging results/findings within the past 24 hours.

## 2022-06-18 NOTE — ASSESSMENT & PLAN NOTE
6/13/2022 Dialysis today.  6/14/2022 Continue with dialysis for this patient.  6/16/2022  Continue with scheduled hemodialysis for this patient.  6/17/2022  Dialysis as scheduled.  6/18/2022 Continue with current therapy.

## 2022-06-18 NOTE — PROGRESS NOTES
[facility]  Pulmonology  Progress Note    Patient Name: Og Garcia  MRN: [id number]  Admission Date: 12/23/2021  Hospital Length of Stay: 177 days  Code Status: Prior  Attending Provider: Cecil Abernathy DO  Primary Care Provider: Hector Arndt DNP, FNP-C   Principal Problem: Denice gangrene    Subjective:     Interval History: awake on exam, continue secretions, doing well on TC      Objective:     Vital Signs (Most Recent):  Temp: (!) 101.2 °F (38.4 °C) (06/18/22 1122)  Pulse: (!) 116 (06/18/22 1130)  Resp: (!) 44 (06/18/22 1130)  BP: 96/69 (06/18/22 1100)  SpO2: (!) 92 % (06/18/22 1130)   Vital Signs (24h Range):  Temp:  [99.3 °F (37.4 °C)-101.2 °F (38.4 °C)] 101.2 °F (38.4 °C)  Pulse:  [102-125] 116  Resp:  [25-44] 44  SpO2:  [89 %-100 %] 92 %  BP: ()/(41-70) 96/69     Weight: 76.5 kg (168 lb 10.4 oz)  Body mass index is 23.52 kg/m².      Intake/Output Summary (Last 24 hours) at 6/18/2022 1252  Last data filed at 6/18/2022 1105  Gross per 24 hour   Intake 3670 ml   Output 700 ml   Net 2970 ml       Physical Exam  Vitals reviewed.   Constitutional:       General: He is not in acute distress.     Comments: Chronically ill appearing   HENT:      Right Ear: External ear normal.      Left Ear: External ear normal.      Mouth/Throat:      Mouth: Mucous membranes are moist.   Eyes:      Conjunctiva/sclera: Conjunctivae normal.   Neck:      Trachea: Tracheostomy present.   Cardiovascular:      Rate and Rhythm: Normal rate and regular rhythm.   Pulmonary:      Effort: Pulmonary effort is normal.      Breath sounds: Normal breath sounds.   Abdominal:      General: Bowel sounds are normal.      Palpations: Abdomen is soft.   Musculoskeletal:         General: Normal range of motion.      Cervical back: Normal range of motion and neck supple.   Skin:     General: Skin is warm and dry.      Capillary Refill: Capillary refill takes less than 2 seconds.   Neurological:      Mental  Status: He is alert. Mental status is at baseline.   Psychiatric:         Mood and Affect: Mood normal.       Vents:  Vent Mode: Standby (05/28/22 0415)  Ventilator Initiated: No (04/23/22 1934)  Set Rate: 0 BPM (05/18/22 0516)  Vt Set: 0 mL (05/18/22 0516)  Pressure Support: 15 cmH20 (05/22/22 0044)  PEEP/CPAP: 5 cmH20 (05/22/22 0044)  Oxygen Concentration (%): 28 (06/18/22 0000)  Peak Airway Pressure: 21 cmH2O (05/22/22 0044)  Plateau Pressure: 20 cmH20 (03/07/22 0500)  Total Ve: 7.8 mL (05/22/22 0044)  F/VT Ratio<105 (RSBI): (!) 22.64 (05/22/22 0044)    Lines/Drains/Airways       Central Venous Catheter Line  Duration                  Hemodialysis Catheter 12/30/21 0900 right internal jugular 170 days              Drain  Duration                  Colostomy 12/18/21 1030 Descending/sigmoid  days         Gastrostomy/Enterostomy 03/09/22 1436 Percutaneous endoscopic gastrostomy (PEG) midline feeding 100 days              Airway  Duration                  Surgical Airway 04/27/22 0856 Shiley;Other (Comment) Cuffed;Long 52 days              Peripheral Intravenous Line  Duration                  Peripheral IV - Single Lumen 06/03/22 1527 18 G Anterior;Right Forearm 14 days                    Significant Labs:    CBC/Anemia Profile:  Recent Labs   Lab 06/17/22  0536   WBC 17.62*   HGB 8.1*   HCT 24.8*   *   MCV 95.4   RDW 15.6*        Chemistries:  Recent Labs   Lab 06/17/22  0536      K 4.6      CO2 26   BUN 72*   CREATININE 2.46*   CALCIUM 9.0       All pertinent labs within the past 24 hours have been reviewed.    Significant Imaging:  I have reviewed all pertinent imaging results/findings within the past 24 hours.    Assessment/Plan:     * Denice gangrene  - Stable   - wound vac     Osteomyelitis  - Continue IV antibiotics per ID recommendations - stop date 07/01   - He has been treated for multidrug resistant bacteria     ID note reviewed and appreciated:  1. Continue tobramycin and  meropenem, both renally dosed, until 7/1  2. 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 Monitor temperature curve and white blood cell count     back to OR for debridement  6/10    Chronic respiratory failure  He is now doing well with nasal cannula  Trach capped  Still having some issues with secretions at times  Will hold off on pulling trach for now  Reassess on Monday  - will add scopolamine for secretions     Type 2 diabetes mellitus without complication, without long-term current use of insulin  - accuchecks look good  - on levemir 15 units and sliding scale    Acute blood loss anemia  Hemoglobin 8.1 receiving Epogen                     FLORESITA Shafer-ACNP  Pulmonology  [facility]

## 2022-06-18 NOTE — SUBJECTIVE & OBJECTIVE
Interval History: awake on exam, continue secretions, doing well on TC      Objective:     Vital Signs (Most Recent):  Temp: (!) 101.2 °F (38.4 °C) (06/18/22 1122)  Pulse: (!) 116 (06/18/22 1130)  Resp: (!) 44 (06/18/22 1130)  BP: 96/69 (06/18/22 1100)  SpO2: (!) 92 % (06/18/22 1130)   Vital Signs (24h Range):  Temp:  [99.3 °F (37.4 °C)-101.2 °F (38.4 °C)] 101.2 °F (38.4 °C)  Pulse:  [102-125] 116  Resp:  [25-44] 44  SpO2:  [89 %-100 %] 92 %  BP: ()/(41-70) 96/69     Weight: 76.5 kg (168 lb 10.4 oz)  Body mass index is 23.52 kg/m².      Intake/Output Summary (Last 24 hours) at 6/18/2022 1252  Last data filed at 6/18/2022 1105  Gross per 24 hour   Intake 3670 ml   Output 700 ml   Net 2970 ml       Physical Exam  Vitals reviewed.   Constitutional:       General: He is not in acute distress.     Comments: Chronically ill appearing   HENT:      Right Ear: External ear normal.      Left Ear: External ear normal.      Mouth/Throat:      Mouth: Mucous membranes are moist.   Eyes:      Conjunctiva/sclera: Conjunctivae normal.   Neck:      Trachea: Tracheostomy present.   Cardiovascular:      Rate and Rhythm: Normal rate and regular rhythm.   Pulmonary:      Effort: Pulmonary effort is normal.      Breath sounds: Normal breath sounds.   Abdominal:      General: Bowel sounds are normal.      Palpations: Abdomen is soft.   Musculoskeletal:         General: Normal range of motion.      Cervical back: Normal range of motion and neck supple.   Skin:     General: Skin is warm and dry.      Capillary Refill: Capillary refill takes less than 2 seconds.   Neurological:      Mental Status: He is alert. Mental status is at baseline.   Psychiatric:         Mood and Affect: Mood normal.       Vents:  Vent Mode: Standby (05/28/22 0415)  Ventilator Initiated: No (04/23/22 1934)  Set Rate: 0 BPM (05/18/22 0516)  Vt Set: 0 mL (05/18/22 0516)  Pressure Support: 15 cmH20 (05/22/22 0044)  PEEP/CPAP: 5 cmH20 (05/22/22 0044)  Oxygen  Concentration (%): 28 (06/18/22 0000)  Peak Airway Pressure: 21 cmH2O (05/22/22 0044)  Plateau Pressure: 20 cmH20 (03/07/22 0500)  Total Ve: 7.8 mL (05/22/22 0044)  F/VT Ratio<105 (RSBI): (!) 22.64 (05/22/22 0044)    Lines/Drains/Airways       Central Venous Catheter Line  Duration                  Hemodialysis Catheter 12/30/21 0900 right internal jugular 170 days              Drain  Duration                  Colostomy 12/18/21 1030 Descending/sigmoid  days         Gastrostomy/Enterostomy 03/09/22 1436 Percutaneous endoscopic gastrostomy (PEG) midline feeding 100 days              Airway  Duration                  Surgical Airway 04/27/22 0856 Marychuy;Other (Comment) Cuffed;Long 52 days              Peripheral Intravenous Line  Duration                  Peripheral IV - Single Lumen 06/03/22 1527 18 G Anterior;Right Forearm 14 days                    Significant Labs:    CBC/Anemia Profile:  Recent Labs   Lab 06/17/22  0536   WBC 17.62*   HGB 8.1*   HCT 24.8*   *   MCV 95.4   RDW 15.6*        Chemistries:  Recent Labs   Lab 06/17/22  0536      K 4.6      CO2 26   BUN 72*   CREATININE 2.46*   CALCIUM 9.0       All pertinent labs within the past 24 hours have been reviewed.    Significant Imaging:  I have reviewed all pertinent imaging results/findings within the past 24 hours.

## 2022-06-18 NOTE — SUBJECTIVE & OBJECTIVE
Interval History: The patient is doing acceptable.  No acute changes.    Review of patient's allergies indicates:  No Known Allergies  Current Facility-Administered Medications   Medication Frequency    0.9%  NaCl infusion (for blood administration) Q24H PRN    0.9%  NaCl infusion PRN    acetaminophen tablet 500 mg Q6H PRN    acetylcysteine 200 mg/ml (20%) solution 2 mL TID WAKE    albuterol nebulizer solution 2.5 mg Q4H PRN    albuterol-ipratropium 2.5 mg-0.5 mg/3 mL nebulizer solution 3 mL Q6H    bisacodyL EC tablet 10 mg Daily PRN    collagenase ointment Daily PRN    dextrose 50% injection 12.5 g PRN    diltiaZEM tablet 90 mg Q6H    epoetin beth-epbx injection 10,000 Units Every Mon, Wed, Fri    glucagon (human recombinant) injection 1 mg PRN    guaiFENesin 100 mg/5 ml syrup 200 mg Q6H    heparin (porcine) injection 4,000 Units PRN    heparin (porcine) injection 5,000 Units Q8H    insulin aspart U-100 injection 1-10 Units Q6H    insulin detemir U-100 injection 15 Units QHS    linezolid tablet 600 mg Q12H    meropenem (MERREM) 500 mg in sodium chloride 0.9 % 100 mL IVPB (MB+) Q24H    metoprolol tartrate (LOPRESSOR) split tablet 12.5 mg BID    pantoprazole suspension 40 mg Daily    potassium iodide 1 gram/mL solution 5 drop QID    sevelamer carbonate pwpk 0.8 g TID WM    simethicone chewable tablet 80 mg TID PRN    sodium chloride 0.9% bolus 250 mL PRN    tobramycin - pharmacy to dose pharmacy to manage frequency       Objective:     Vital Signs (Most Recent):  Temp: 99.9 °F (37.7 °C) (06/18/22 0700)  Pulse: (!) 125 (06/18/22 0900)  Resp: (!) 41 (06/18/22 0900)  BP: (!) 107/52 (06/18/22 0900)  SpO2: 100 % (06/18/22 0900)  O2 Device (Oxygen Therapy): nasal cannula w/ humidification (06/18/22 0724)   Vital Signs (24h Range):  Temp:  [99.3 °F (37.4 °C)-101.1 °F (38.4 °C)] 99.9 °F (37.7 °C)  Pulse:  [102-125] 125  Resp:  [25-44] 41  SpO2:  [89 %-100 %] 100 %  BP: ()/(41-70) 107/52     Weight: 76.5 kg (168 lb 10.4  oz) (06/18/22 0530)  Body mass index is 23.52 kg/m².  Body surface area is 1.96 meters squared.    I/O last 3 completed shifts:  In: 4840 [P.O.:240; Other:900; NG/GT:3600; IV Piggyback:100]  Out: 1900 [Other:1000; Stool:900]    Physical Exam  Vitals reviewed.   HENT:      Head: Normocephalic.   Cardiovascular:      Rate and Rhythm: Regular rhythm.   Pulmonary:      Effort: Pulmonary effort is normal.   Abdominal:      Palpations: Abdomen is soft.   Neurological:      Mental Status: He is alert.       Significant Labs:  BMP:   Recent Labs   Lab 06/17/22  0536   GLU 89      K 4.6      CO2 26   BUN 72*   CREATININE 2.46*   CALCIUM 9.0     CBC:   Recent Labs   Lab 06/17/22  0536   WBC 17.62*   RBC 2.60*   HGB 8.1*   HCT 24.8*   *   MCV 95.4   MCH 31.2*   MCHC 32.7        Significant Imaging:  Labs: Reviewed

## 2022-06-18 NOTE — ASSESSMENT & PLAN NOTE
He is now doing well with nasal cannula  Trach capped  Still having some issues with secretions at times  Will hold off on pulling trach for now  Reassess on Monday  - will add scopolamine for secretions

## 2022-06-18 NOTE — PROGRESS NOTES
[facility]  Nephrology  Progress Note    Patient Name: Og Garcia  MRN: [id number]  Admission Date: 12/23/2021  Hospital Length of Stay: 177 days  Attending Provider: Cecil Abernathy DO   Primary Care Physician: Hector Arndt DNP, FNP-C  Principal Problem:Denice gangrene    Subjective:     HPI: The patient is known from the previous nephrology consult at [facility].  He is no at Specialty and continues to need dialysis support.      Interval History: The patient is doing acceptable.  No acute changes.    Review of patient's allergies indicates:  No Known Allergies  Current Facility-Administered Medications   Medication Frequency    0.9%  NaCl infusion (for blood administration) Q24H PRN    0.9%  NaCl infusion PRN    acetaminophen tablet 500 mg Q6H PRN    acetylcysteine 200 mg/ml (20%) solution 2 mL TID WAKE    albuterol nebulizer solution 2.5 mg Q4H PRN    albuterol-ipratropium 2.5 mg-0.5 mg/3 mL nebulizer solution 3 mL Q6H    bisacodyL EC tablet 10 mg Daily PRN    collagenase ointment Daily PRN    dextrose 50% injection 12.5 g PRN    diltiaZEM tablet 90 mg Q6H    epoetin beth-epbx injection 10,000 Units Every Mon, Wed, Fri    glucagon (human recombinant) injection 1 mg PRN    guaiFENesin 100 mg/5 ml syrup 200 mg Q6H    heparin (porcine) injection 4,000 Units PRN    heparin (porcine) injection 5,000 Units Q8H    insulin aspart U-100 injection 1-10 Units Q6H    insulin detemir U-100 injection 15 Units QHS    linezolid tablet 600 mg Q12H    meropenem (MERREM) 500 mg in sodium chloride 0.9 % 100 mL IVPB (MB+) Q24H    metoprolol tartrate (LOPRESSOR) split tablet 12.5 mg BID    pantoprazole suspension 40 mg Daily    potassium iodide 1 gram/mL solution 5 drop QID    sevelamer carbonate pwpk 0.8 g TID WM    simethicone chewable tablet 80 mg TID PRN    sodium chloride 0.9% bolus 250 mL PRN    tobramycin - pharmacy to dose pharmacy to manage frequency       Objective:      Vital Signs (Most Recent):  Temp: 99.9 °F (37.7 °C) (06/18/22 0700)  Pulse: (!) 125 (06/18/22 0900)  Resp: (!) 41 (06/18/22 0900)  BP: (!) 107/52 (06/18/22 0900)  SpO2: 100 % (06/18/22 0900)  O2 Device (Oxygen Therapy): nasal cannula w/ humidification (06/18/22 0724)   Vital Signs (24h Range):  Temp:  [99.3 °F (37.4 °C)-101.1 °F (38.4 °C)] 99.9 °F (37.7 °C)  Pulse:  [102-125] 125  Resp:  [25-44] 41  SpO2:  [89 %-100 %] 100 %  BP: ()/(41-70) 107/52     Weight: 76.5 kg (168 lb 10.4 oz) (06/18/22 0530)  Body mass index is 23.52 kg/m².  Body surface area is 1.96 meters squared.    I/O last 3 completed shifts:  In: 4840 [P.O.:240; Other:900; NG/GT:3600; IV Piggyback:100]  Out: 1900 [Other:1000; Stool:900]    Physical Exam  Vitals reviewed.   HENT:      Head: Normocephalic.   Cardiovascular:      Rate and Rhythm: Regular rhythm.   Pulmonary:      Effort: Pulmonary effort is normal.   Abdominal:      Palpations: Abdomen is soft.   Neurological:      Mental Status: He is alert.       Significant Labs:  BMP:   Recent Labs   Lab 06/17/22  0536   GLU 89      K 4.6      CO2 26   BUN 72*   CREATININE 2.46*   CALCIUM 9.0     CBC:   Recent Labs   Lab 06/17/22  0536   WBC 17.62*   RBC 2.60*   HGB 8.1*   HCT 24.8*   *   MCV 95.4   MCH 31.2*   MCHC 32.7        Significant Imaging:  Labs: Reviewed    Assessment/Plan:     ESRD (end stage renal disease)    6/13/2022 Dialysis today.  6/14/2022 Continue with dialysis for this patient.  6/16/2022  Continue with scheduled hemodialysis for this patient.  6/17/2022  Dialysis as scheduled.  6/18/2022 Continue with current therapy.    Debility  DM    Thank you for your consult. I will follow-up with patient. Please contact us if you have any additional questions.    Edwin Pryor Jr, MD  Nephrology  [facility]

## 2022-06-18 NOTE — NURSING
BG AT 0000-119 and 0600-112. Metoprolol held due to MAP being 51-59. Currently is 67. Low grade fever 101.1 at 2000. Given tylenol decreased to wnl.

## 2022-06-19 NOTE — ASSESSMENT & PLAN NOTE
Continue current treatment  Chronic condition  Glucoses are acceptable.  6/13/2022 Condition is stable.  6/17/2022  Stable.  6/19/2022  Continue with current treatment

## 2022-06-19 NOTE — SUBJECTIVE & OBJECTIVE
Interval History: tolerating TC well; still with secretions      Objective:     Vital Signs (Most Recent):  Temp: 99.9 °F (37.7 °C) (06/19/22 1100)  Pulse: 71 (06/19/22 1300)  Resp: (!) 38 (06/19/22 1300)  BP: (!) 74/40 (06/19/22 1300)  SpO2: 100 % (06/19/22 1300)   Vital Signs (24h Range):  Temp:  [99.4 °F (37.4 °C)-101.5 °F (38.6 °C)] 99.9 °F (37.7 °C)  Pulse:  [] 71  Resp:  [25-48] 38  SpO2:  [92 %-100 %] 100 %  BP: ()/(39-77) 74/40     Weight: 78.1 kg (172 lb 2.9 oz)  Body mass index is 24.01 kg/m².      Intake/Output Summary (Last 24 hours) at 6/19/2022 1328  Last data filed at 6/19/2022 1200  Gross per 24 hour   Intake 2830 ml   Output 500 ml   Net 2330 ml       Physical Exam  Vitals reviewed.   Constitutional:       General: He is not in acute distress.     Comments: Chronically ill appearing   HENT:      Right Ear: External ear normal.      Left Ear: External ear normal.      Mouth/Throat:      Mouth: Mucous membranes are moist.   Eyes:      Conjunctiva/sclera: Conjunctivae normal.   Neck:      Trachea: Tracheostomy present.   Cardiovascular:      Rate and Rhythm: Normal rate and regular rhythm.   Pulmonary:      Effort: Pulmonary effort is normal.      Breath sounds: Normal breath sounds.   Abdominal:      General: Bowel sounds are normal.      Palpations: Abdomen is soft.   Musculoskeletal:         General: Normal range of motion.      Cervical back: Normal range of motion and neck supple.   Skin:     General: Skin is warm and dry.      Capillary Refill: Capillary refill takes less than 2 seconds.   Neurological:      Mental Status: He is alert. Mental status is at baseline.   Psychiatric:         Mood and Affect: Mood normal.       Vents:  Vent Mode: Standby (05/28/22 0415)  Ventilator Initiated: No (04/23/22 1934)  Set Rate: 0 BPM (05/18/22 0516)  Vt Set: 0 mL (05/18/22 0516)  Pressure Support: 15 cmH20 (05/22/22 0044)  PEEP/CPAP: 5 cmH20 (05/22/22 0044)  Oxygen Concentration (%): 28  (06/19/22 0012)  Peak Airway Pressure: 21 cmH2O (05/22/22 0044)  Plateau Pressure: 20 cmH20 (03/07/22 0500)  Total Ve: 7.8 mL (05/22/22 0044)  F/VT Ratio<105 (RSBI): (!) 22.64 (05/22/22 0044)    Lines/Drains/Airways       Central Venous Catheter Line  Duration                  Hemodialysis Catheter 12/30/21 0900 right internal jugular 171 days              Drain  Duration                  Colostomy 12/18/21 1030 Descending/sigmoid  days         Gastrostomy/Enterostomy 03/09/22 1436 Percutaneous endoscopic gastrostomy (PEG) midline feeding 101 days              Airway  Duration                  Surgical Airway 04/27/22 0856 Marychuy;Other (Comment) Cuffed;Long 53 days              Peripheral Intravenous Line  Duration                  Peripheral IV - Single Lumen 06/03/22 1527 18 G Anterior;Right Forearm 15 days                    Significant Labs:    CBC/Anemia Profile:  No results for input(s): WBC, HGB, HCT, PLT, MCV, RDW, IRON, FERRITIN, RETIC, FOLATE, [id number], OCCULTBLOOD in the last 48 hours.     Chemistries:  No results for input(s): NA, K, CL, CO2, BUN, CREATININE, CALCIUM, ALBUMIN, PROT, BILITOT, ALKPHOS, ALT, AST, GLUCOSE, MG, PHOS in the last 48 hours.    All pertinent labs within the past 24 hours have been reviewed.    Significant Imaging:  I have reviewed all pertinent imaging results/findings within the past 24 hours.

## 2022-06-19 NOTE — ASSESSMENT & PLAN NOTE
6/13/2022 Dialysis today.  6/14/2022 Continue with dialysis for this patient.  6/16/2022  Continue with scheduled hemodialysis for this patient.  6/17/2022  Dialysis as scheduled.  6/18/2022 Continue with current therapy.  6/19/2022  Dialysis as scheduled.

## 2022-06-19 NOTE — PROGRESS NOTES
[facility]  Pulmonology  Progress Note    Patient Name: Og Garcia  MRN: [id number]  Admission Date: 12/23/2021  Hospital Length of Stay: 178 days  Code Status: Prior  Attending Provider: Cecil Abernathy DO  Primary Care Provider: Hector Arndt DNP, FNP-C   Principal Problem: Denice gangrene    Subjective:     Interval History: tolerating TC well; still with secretions      Objective:     Vital Signs (Most Recent):  Temp: 99.9 °F (37.7 °C) (06/19/22 1100)  Pulse: 71 (06/19/22 1300)  Resp: (!) 38 (06/19/22 1300)  BP: (!) 74/40 (06/19/22 1300)  SpO2: 100 % (06/19/22 1300)   Vital Signs (24h Range):  Temp:  [99.4 °F (37.4 °C)-101.5 °F (38.6 °C)] 99.9 °F (37.7 °C)  Pulse:  [] 71  Resp:  [25-48] 38  SpO2:  [92 %-100 %] 100 %  BP: ()/(39-77) 74/40     Weight: 78.1 kg (172 lb 2.9 oz)  Body mass index is 24.01 kg/m².      Intake/Output Summary (Last 24 hours) at 6/19/2022 1328  Last data filed at 6/19/2022 1200  Gross per 24 hour   Intake 2830 ml   Output 500 ml   Net 2330 ml       Physical Exam  Vitals reviewed.   Constitutional:       General: He is not in acute distress.     Comments: Chronically ill appearing   HENT:      Right Ear: External ear normal.      Left Ear: External ear normal.      Mouth/Throat:      Mouth: Mucous membranes are moist.   Eyes:      Conjunctiva/sclera: Conjunctivae normal.   Neck:      Trachea: Tracheostomy present.   Cardiovascular:      Rate and Rhythm: Normal rate and regular rhythm.   Pulmonary:      Effort: Pulmonary effort is normal.      Breath sounds: Normal breath sounds.   Abdominal:      General: Bowel sounds are normal.      Palpations: Abdomen is soft.   Musculoskeletal:         General: Normal range of motion.      Cervical back: Normal range of motion and neck supple.   Skin:     General: Skin is warm and dry.      Capillary Refill: Capillary refill takes less than 2 seconds.   Neurological:      Mental Status: He is alert.  Mental status is at baseline.   Psychiatric:         Mood and Affect: Mood normal.       Vents:  Vent Mode: Standby (05/28/22 0415)  Ventilator Initiated: No (04/23/22 1934)  Set Rate: 0 BPM (05/18/22 0516)  Vt Set: 0 mL (05/18/22 0516)  Pressure Support: 15 cmH20 (05/22/22 0044)  PEEP/CPAP: 5 cmH20 (05/22/22 0044)  Oxygen Concentration (%): 28 (06/19/22 0012)  Peak Airway Pressure: 21 cmH2O (05/22/22 0044)  Plateau Pressure: 20 cmH20 (03/07/22 0500)  Total Ve: 7.8 mL (05/22/22 0044)  F/VT Ratio<105 (RSBI): (!) 22.64 (05/22/22 0044)    Lines/Drains/Airways       Central Venous Catheter Line  Duration                  Hemodialysis Catheter 12/30/21 0900 right internal jugular 171 days              Drain  Duration                  Colostomy 12/18/21 1030 Descending/sigmoid  days         Gastrostomy/Enterostomy 03/09/22 1436 Percutaneous endoscopic gastrostomy (PEG) midline feeding 101 days              Airway  Duration                  Surgical Airway 04/27/22 0856 Shiley;Other (Comment) Cuffed;Long 53 days              Peripheral Intravenous Line  Duration                  Peripheral IV - Single Lumen 06/03/22 1527 18 G Anterior;Right Forearm 15 days                    Significant Labs:    CBC/Anemia Profile:  No results for input(s): WBC, HGB, HCT, PLT, MCV, RDW, IRON, FERRITIN, RETIC, FOLATE, [id number], OCCULTBLOOD in the last 48 hours.     Chemistries:  No results for input(s): NA, K, CL, CO2, BUN, CREATININE, CALCIUM, ALBUMIN, PROT, BILITOT, ALKPHOS, ALT, AST, GLUCOSE, MG, PHOS in the last 48 hours.    All pertinent labs within the past 24 hours have been reviewed.    Significant Imaging:  I have reviewed all pertinent imaging results/findings within the past 24 hours.    Assessment/Plan:     * Denice gangrene  - Stable   - wound vac     Osteomyelitis  - Continue IV antibiotics per ID recommendations - stop date 07/01   - He has been treated for multidrug resistant bacteria     ID note reviewed and  appreciated:  1. Continue tobramycin and meropenem, both renally dosed, until 7/1  2. 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 Monitor temperature curve and white blood cell count     back to OR for debridement  6/10    Chronic respiratory failure  He is now doing well with nasal cannula  Trach capped  Still having some issues with secretions at times  Will hold off on pulling trach for now  Reassess on Monday  - will add scopolamine for secretions     Pressure ulcer of sacral region, stage 4  - Continue with wound care    ESRD (end stage renal disease)  - Started on HD 12/30   - Continue with HD per nephrology  - Now on epogen      Type 2 diabetes mellitus without complication, without long-term current use of insulin  - accuchecks look good  - on levemir 15 units and sliding scale    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

## 2022-06-19 NOTE — PROGRESS NOTES
[facility]  Nephrology  Progress Note    Patient Name: Og Garcia  MRN: [id number]  Admission Date: 12/23/2021  Hospital Length of Stay: 178 days  Attending Provider: Cecil Abernathy DO   Primary Care Physician: Hector Arndt DNP, FNP-C  Principal Problem:Denice gangrene    Subjective:     HPI: The patient is known from the previous nephrology consult at [facility].  He is no at Specialty and continues to need dialysis support.      Interval History: The patient is resting.  No acute changes.    Review of patient's allergies indicates:  No Known Allergies  Current Facility-Administered Medications   Medication Frequency    0.9%  NaCl infusion (for blood administration) Q24H PRN    0.9%  NaCl infusion PRN    acetaminophen tablet 500 mg Q6H PRN    acetylcysteine 200 mg/ml (20%) solution 2 mL TID WAKE    albuterol nebulizer solution 2.5 mg Q4H PRN    albuterol-ipratropium 2.5 mg-0.5 mg/3 mL nebulizer solution 3 mL Q6H    bisacodyL EC tablet 10 mg Daily PRN    collagenase ointment Daily PRN    dextrose 50% injection 12.5 g PRN    diltiaZEM tablet 90 mg Q6H    epoetin beth-epbx injection 10,000 Units Every Mon, Wed, Fri    glucagon (human recombinant) injection 1 mg PRN    guaiFENesin 100 mg/5 ml syrup 200 mg Q6H    heparin (porcine) injection 4,000 Units PRN    heparin (porcine) injection 5,000 Units Q8H    insulin aspart U-100 injection 1-10 Units Q6H    insulin detemir U-100 injection 15 Units QHS    linezolid tablet 600 mg Q12H    meropenem (MERREM) 500 mg in sodium chloride 0.9 % 100 mL IVPB (MB+) Q24H    metoprolol tartrate (LOPRESSOR) split tablet 12.5 mg BID    pantoprazole suspension 40 mg Daily    potassium iodide 1 gram/mL solution 5 drop QID    sevelamer carbonate pwpk 0.8 g TID WM    simethicone chewable tablet 80 mg TID PRN    sodium chloride 0.9% bolus 250 mL PRN    tobramycin - pharmacy to dose pharmacy to manage frequency       Objective:      Vital Signs (Most Recent):  Temp: (!) 100.4 °F (38 °C) (06/19/22 0700)  Pulse: (!) 111 (06/19/22 0900)  Resp: (!) 39 (06/19/22 0900)  BP: (!) 90/50 (06/19/22 0900)  SpO2: 100 % (06/19/22 0900)  O2 Device (Oxygen Therapy): nasal cannula w/ humidification (06/19/22 0735)   Vital Signs (24h Range):  Temp:  [99.4 °F (37.4 °C)-101.5 °F (38.6 °C)] 100.4 °F (38 °C)  Pulse:  [] 111  Resp:  [34-48] 39  SpO2:  [92 %-100 %] 100 %  BP: ()/(39-77) 90/50     Weight: 78.1 kg (172 lb 2.9 oz) (06/19/22 0600)  Body mass index is 24.01 kg/m².  Body surface area is 1.98 meters squared.    I/O last 3 completed shifts:  In: 4960 [P.O.:360; Other:1110; NG/GT:3390; IV Piggyback:100]  Out: 1000 [Other:100; Stool:900]    Physical Exam  Vitals reviewed.   HENT:      Head: Normocephalic.   Cardiovascular:      Rate and Rhythm: Regular rhythm.   Pulmonary:      Effort: Pulmonary effort is normal.      Breath sounds: Rales present.   Abdominal:      Palpations: Abdomen is soft.   Neurological:      Mental Status: He is alert.       Significant Labs:  BMP:   Recent Labs   Lab 06/17/22  0536   GLU 89      K 4.6      CO2 26   BUN 72*   CREATININE 2.46*   CALCIUM 9.0     CBC:   Recent Labs   Lab 06/17/22  0536   WBC 17.62*   RBC 2.60*   HGB 8.1*   HCT 24.8*   *   MCV 95.4   MCH 31.2*   MCHC 32.7        Significant Imaging:  Labs: Reviewed    Assessment/Plan:     ESRD (end stage renal disease)    6/13/2022 Dialysis today.  6/14/2022 Continue with dialysis for this patient.  6/16/2022  Continue with scheduled hemodialysis for this patient.  6/17/2022  Dialysis as scheduled.  6/18/2022 Continue with current therapy.  6/19/2022  Dialysis as scheduled.    Type 2 diabetes mellitus without complication, without long-term current use of insulin  Continue current treatment  Chronic condition  Glucoses are acceptable.  6/13/2022 Condition is stable.  6/17/2022  Stable.  6/19/2022  Continue with current treatment        Thank  you for your consult. I will follow-up with patient. Please contact us if you have any additional questions.    Edwin Pryor Jr, MD  Nephrology  [facility]

## 2022-06-19 NOTE — SUBJECTIVE & OBJECTIVE
Interval History: The patient is resting.  No acute changes.    Review of patient's allergies indicates:  No Known Allergies  Current Facility-Administered Medications   Medication Frequency    0.9%  NaCl infusion (for blood administration) Q24H PRN    0.9%  NaCl infusion PRN    acetaminophen tablet 500 mg Q6H PRN    acetylcysteine 200 mg/ml (20%) solution 2 mL TID WAKE    albuterol nebulizer solution 2.5 mg Q4H PRN    albuterol-ipratropium 2.5 mg-0.5 mg/3 mL nebulizer solution 3 mL Q6H    bisacodyL EC tablet 10 mg Daily PRN    collagenase ointment Daily PRN    dextrose 50% injection 12.5 g PRN    diltiaZEM tablet 90 mg Q6H    epoetin beth-epbx injection 10,000 Units Every Mon, Wed, Fri    glucagon (human recombinant) injection 1 mg PRN    guaiFENesin 100 mg/5 ml syrup 200 mg Q6H    heparin (porcine) injection 4,000 Units PRN    heparin (porcine) injection 5,000 Units Q8H    insulin aspart U-100 injection 1-10 Units Q6H    insulin detemir U-100 injection 15 Units QHS    linezolid tablet 600 mg Q12H    meropenem (MERREM) 500 mg in sodium chloride 0.9 % 100 mL IVPB (MB+) Q24H    metoprolol tartrate (LOPRESSOR) split tablet 12.5 mg BID    pantoprazole suspension 40 mg Daily    potassium iodide 1 gram/mL solution 5 drop QID    sevelamer carbonate pwpk 0.8 g TID WM    simethicone chewable tablet 80 mg TID PRN    sodium chloride 0.9% bolus 250 mL PRN    tobramycin - pharmacy to dose pharmacy to manage frequency       Objective:     Vital Signs (Most Recent):  Temp: (!) 100.4 °F (38 °C) (06/19/22 0700)  Pulse: (!) 111 (06/19/22 0900)  Resp: (!) 39 (06/19/22 0900)  BP: (!) 90/50 (06/19/22 0900)  SpO2: 100 % (06/19/22 0900)  O2 Device (Oxygen Therapy): nasal cannula w/ humidification (06/19/22 0735)   Vital Signs (24h Range):  Temp:  [99.4 °F (37.4 °C)-101.5 °F (38.6 °C)] 100.4 °F (38 °C)  Pulse:  [] 111  Resp:  [34-48] 39  SpO2:  [92 %-100 %] 100 %  BP: ()/(39-77) 90/50     Weight: 78.1 kg (172 lb 2.9 oz)  (06/19/22 0600)  Body mass index is 24.01 kg/m².  Body surface area is 1.98 meters squared.    I/O last 3 completed shifts:  In: 4960 [P.O.:360; Other:1110; NG/GT:3390; IV Piggyback:100]  Out: 1000 [Other:100; Stool:900]    Physical Exam  Vitals reviewed.   HENT:      Head: Normocephalic.   Cardiovascular:      Rate and Rhythm: Regular rhythm.   Pulmonary:      Effort: Pulmonary effort is normal.      Breath sounds: Rales present.   Abdominal:      Palpations: Abdomen is soft.   Neurological:      Mental Status: He is alert.       Significant Labs:  BMP:   Recent Labs   Lab 06/17/22  0536   GLU 89      K 4.6      CO2 26   BUN 72*   CREATININE 2.46*   CALCIUM 9.0     CBC:   Recent Labs   Lab 06/17/22  0536   WBC 17.62*   RBC 2.60*   HGB 8.1*   HCT 24.8*   *   MCV 95.4   MCH 31.2*   MCHC 32.7        Significant Imaging:  Labs: Reviewed

## 2022-06-19 NOTE — ASSESSMENT & PLAN NOTE
- Stable with no acute issues- bedbound    Chief Complaint   Patient presents with     New Patient     55 yo FM, here for evaluation of severe aortic stenosis.     Vitals were taken and medications were reconciled.  Latoya Varma MA    2:10 PM

## 2022-06-20 NOTE — PROGRESS NOTES
[facility]  Pulmonology  Progress Note    Patient Name: Og Garcia  MRN: [id number]  Admission Date: 12/23/2021  Hospital Length of Stay: 179 days  Code Status: Prior  Attending Provider: Cecil Abernathy DO  Primary Care Provider: Hector Arndt DNP, FNP-C   Principal Problem: Denice gangrene    Subjective:     Interval History: No acute events overnight. He is currently resting comfortably. Oxygenating adequately on 2L nasal cannula. He is afebrile this am and vital signs are stable.     Objective:     Vital Signs (Most Recent):  Temp: 99.9 °F (37.7 °C) (06/20/22 0400)  Pulse: (!) 114 (06/20/22 0751)  Resp: (!) 38 (06/20/22 0751)  BP: (!) 97/46 (06/20/22 0700)  SpO2: 100 % (06/20/22 0751)   Vital Signs (24h Range):  Temp:  [99.9 °F (37.7 °C)-100.8 °F (38.2 °C)] 99.9 °F (37.7 °C)  Pulse:  [] 114  Resp:  [25-49] 38  SpO2:  [95 %-100 %] 100 %  BP: ()/(40-67) 97/46     Weight: 78.6 kg (173 lb 4.5 oz)  Body mass index is 24.17 kg/m².      Intake/Output Summary (Last 24 hours) at 6/20/2022 0815  Last data filed at 6/20/2022 0400  Gross per 24 hour   Intake 1950 ml   Output 200 ml   Net 1750 ml         Physical Exam  Vitals reviewed.   Constitutional:       General: He is not in acute distress.     Appearance: He is not ill-appearing.      Comments: Chronically ill appearing   HENT:      Right Ear: External ear normal.      Left Ear: External ear normal.      Mouth/Throat:      Mouth: Mucous membranes are moist.   Eyes:      Conjunctiva/sclera: Conjunctivae normal.   Neck:      Trachea: Tracheostomy present.   Cardiovascular:      Rate and Rhythm: Normal rate and regular rhythm.   Pulmonary:      Effort: Pulmonary effort is normal.      Breath sounds: Normal breath sounds.   Abdominal:      General: Bowel sounds are normal.      Palpations: Abdomen is soft.   Musculoskeletal:         General: Normal range of motion.      Cervical back: Normal range of motion and neck  supple.   Skin:     General: Skin is warm and dry.      Capillary Refill: Capillary refill takes less than 2 seconds.   Neurological:      Mental Status: He is alert. Mental status is at baseline.   Psychiatric:         Mood and Affect: Mood normal.       Vents:  Vent Mode: Standby (05/28/22 0415)  Ventilator Initiated: No (04/23/22 1934)  Set Rate: 0 BPM (05/18/22 0516)  Vt Set: 0 mL (05/18/22 0516)  Pressure Support: 15 cmH20 (05/22/22 0044)  PEEP/CPAP: 5 cmH20 (05/22/22 0044)  Oxygen Concentration (%): 28 (06/20/22 0031)  Peak Airway Pressure: 21 cmH2O (05/22/22 0044)  Plateau Pressure: 20 cmH20 (03/07/22 0500)  Total Ve: 7.8 mL (05/22/22 0044)  F/VT Ratio<105 (RSBI): (!) 22.64 (05/22/22 0044)    Lines/Drains/Airways       Central Venous Catheter Line  Duration                  Hemodialysis Catheter 12/30/21 0900 right internal jugular 171 days              Drain  Duration                  Colostomy 12/18/21 1030 Descending/sigmoid  days         Gastrostomy/Enterostomy 03/09/22 1436 Percutaneous endoscopic gastrostomy (PEG) midline feeding 102 days              Airway  Duration                  Surgical Airway 04/27/22 0856 Shiley;Other (Comment) Cuffed;Long 53 days              Peripheral Intravenous Line  Duration                  Peripheral IV - Single Lumen 06/03/22 1527 18 G Anterior;Right Forearm 16 days                    Significant Labs:    CBC/Anemia Profile:  Recent Labs   Lab 06/20/22 0315   WBC 14.04*   HGB 7.5*   HCT 23.5*   *   MCV 96.7*   RDW 15.4*          Chemistries:  Recent Labs   Lab 06/20/22  0315      K 4.5   CL 99   CO2 27   *   CREATININE 2.98*   CALCIUM 9.2         All pertinent labs within the past 24 hours have been reviewed.    Significant Imaging:  I have reviewed all pertinent imaging results/findings within the past 24 hours.    Assessment/Plan:     * Denice gangrene  - Stable   - wound vac     Osteomyelitis  - Continue IV antibiotics per ID  recommendations - stop date 07/01   - He has been treated for multidrug resistant bacteria     ID note reviewed and appreciated:  1. Continue tobramycin and meropenem, both renally dosed, until 7/1  2. 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 Monitor temperature curve and white blood cell count     back to OR for debridement  6/10    Chronic respiratory failure  He is now doing well with nasal cannula  Trach capped  Still having some issues with secretions at times  Will hold off on pulling trach for now  Reassess on Monday  added scopolamine for secretions   Hopefully we will be able to pull trach in the next day or two    Pressure ulcer of sacral region, stage 4  - Continue with wound care    ESRD (end stage renal disease)  - Started on HD 12/30   - Continue with HD per nephrology  - Now on epogen      Type 2 diabetes mellitus without complication, without long-term current use of insulin  - accuchecks look good  - on levemir 15 units and sliding scale    Hypertension  Blood pressure looks okay   He is on cardizem and metoprolol (mainly for rate)        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

## 2022-06-20 NOTE — SUBJECTIVE & OBJECTIVE
Interval History: The patient is resting.  No acute changes.    Review of patient's allergies indicates:  No Known Allergies  Current Facility-Administered Medications   Medication Frequency    0.9%  NaCl infusion (for blood administration) Q24H PRN    0.9%  NaCl infusion PRN    acetaminophen tablet 500 mg Q6H PRN    acetylcysteine 200 mg/ml (20%) solution 2 mL TID WAKE    albuterol nebulizer solution 2.5 mg Q4H PRN    albuterol-ipratropium 2.5 mg-0.5 mg/3 mL nebulizer solution 3 mL Q6H    bisacodyL EC tablet 10 mg Daily PRN    collagenase ointment Daily PRN    dextrose 50% injection 12.5 g PRN    diltiaZEM tablet 90 mg Q6H    epoetin beth-epbx injection 10,000 Units Every Mon, Wed, Fri    glucagon (human recombinant) injection 1 mg PRN    guaiFENesin 100 mg/5 ml syrup 200 mg Q6H    heparin (porcine) injection 4,000 Units PRN    heparin (porcine) injection 5,000 Units Q8H    insulin aspart U-100 injection 1-10 Units Q6H    insulin detemir U-100 injection 15 Units QHS    linezolid tablet 600 mg Q12H    meropenem (MERREM) 500 mg in sodium chloride 0.9 % 100 mL IVPB (MB+) Q24H    metoprolol tartrate (LOPRESSOR) split tablet 12.5 mg BID    pantoprazole suspension 40 mg Daily    potassium iodide 1 gram/mL solution 5 drop QID    scopolamine 1.3-1.5 mg (1 mg over 3 days) 1 patch Q3 Days    sevelamer carbonate pwpk 0.8 g TID WM    simethicone chewable tablet 80 mg TID PRN    sodium chloride 0.9% bolus 250 mL PRN    tobramycin - pharmacy to dose pharmacy to manage frequency       Objective:     Vital Signs (Most Recent):  Temp: 99.9 °F (37.7 °C) (06/20/22 0400)  Pulse: (!) 127 (06/20/22 0930)  Resp: (!) 44 (06/20/22 0930)  BP: (!) 128/98 (06/20/22 0930)  SpO2: (!) 93 % (06/20/22 0930)  O2 Device (Oxygen Therapy): nasal cannula w/ humidification (06/20/22 1532)   Vital Signs (24h Range):  Temp:  [99.9 °F (37.7 °C)-100.8 °F (38.2 °C)] 99.9 °F (37.7 °C)  Pulse:  [] 127  Resp:  [25-49] 44  SpO2:  [93 %-100 %] 93 %  BP:  ()/(40-98) 128/98     Weight: 78.6 kg (173 lb 4.5 oz) (06/20/22 0432)  Body mass index is 24.17 kg/m².  Body surface area is 1.98 meters squared.    I/O last 3 completed shifts:  In: 3480 [P.O.:240; Other:420; NG/GT:2220; IV Piggyback:600]  Out: 650 [Other:150; Stool:500]    Physical Exam  Vitals reviewed.   HENT:      Head: Normocephalic.      Mouth/Throat:      Mouth: Mucous membranes are moist.   Cardiovascular:      Rate and Rhythm: Regular rhythm.   Pulmonary:      Effort: Pulmonary effort is normal.   Abdominal:      Palpations: Abdomen is soft.   Neurological:      Mental Status: He is alert.       Significant Labs:  BMP:   Recent Labs   Lab 06/20/22  0315   GLU 88      K 4.5   CL 99   CO2 27   *   CREATININE 2.98*   CALCIUM 9.2     CBC:   Recent Labs   Lab 06/20/22  0315   WBC 14.04*   RBC 2.43*   HGB 7.5*   HCT 23.5*   *   MCV 96.7*   MCH 30.9   MCHC 31.9*        Significant Imaging:  Labs: Reviewed

## 2022-06-20 NOTE — PROGRESS NOTES
[facility]  Nephrology  Progress Note    Patient Name: Og Garcia  MRN: [id number]  Admission Date: 12/23/2021  Hospital Length of Stay: 179 days  Attending Provider: Cecil Abernathy DO   Primary Care Physician: Hector Arndt DNP, FNP-C  Principal Problem:Denice gangrene    Subjective:     HPI: The patient is known from the previous nephrology consult at [facility].  He is no at Specialty and continues to need dialysis support.      Interval History: The patient is resting.  No acute changes.    Review of patient's allergies indicates:  No Known Allergies  Current Facility-Administered Medications   Medication Frequency    0.9%  NaCl infusion (for blood administration) Q24H PRN    0.9%  NaCl infusion PRN    acetaminophen tablet 500 mg Q6H PRN    acetylcysteine 200 mg/ml (20%) solution 2 mL TID WAKE    albuterol nebulizer solution 2.5 mg Q4H PRN    albuterol-ipratropium 2.5 mg-0.5 mg/3 mL nebulizer solution 3 mL Q6H    bisacodyL EC tablet 10 mg Daily PRN    collagenase ointment Daily PRN    dextrose 50% injection 12.5 g PRN    diltiaZEM tablet 90 mg Q6H    epoetin beth-epbx injection 10,000 Units Every Mon, Wed, Fri    glucagon (human recombinant) injection 1 mg PRN    guaiFENesin 100 mg/5 ml syrup 200 mg Q6H    heparin (porcine) injection 4,000 Units PRN    heparin (porcine) injection 5,000 Units Q8H    insulin aspart U-100 injection 1-10 Units Q6H    insulin detemir U-100 injection 15 Units QHS    linezolid tablet 600 mg Q12H    meropenem (MERREM) 500 mg in sodium chloride 0.9 % 100 mL IVPB (MB+) Q24H    metoprolol tartrate (LOPRESSOR) split tablet 12.5 mg BID    pantoprazole suspension 40 mg Daily    potassium iodide 1 gram/mL solution 5 drop QID    scopolamine 1.3-1.5 mg (1 mg over 3 days) 1 patch Q3 Days    sevelamer carbonate pwpk 0.8 g TID WM    simethicone chewable tablet 80 mg TID PRN    sodium chloride 0.9% bolus 250 mL PRN    tobramycin - pharmacy  to dose pharmacy to manage frequency       Objective:     Vital Signs (Most Recent):  Temp: 99.9 °F (37.7 °C) (06/20/22 0400)  Pulse: (!) 127 (06/20/22 0930)  Resp: (!) 44 (06/20/22 0930)  BP: (!) 128/98 (06/20/22 0930)  SpO2: (!) 93 % (06/20/22 0930)  O2 Device (Oxygen Therapy): nasal cannula w/ humidification (06/20/22 0751)   Vital Signs (24h Range):  Temp:  [99.9 °F (37.7 °C)-100.8 °F (38.2 °C)] 99.9 °F (37.7 °C)  Pulse:  [] 127  Resp:  [25-49] 44  SpO2:  [93 %-100 %] 93 %  BP: ()/(40-98) 128/98     Weight: 78.6 kg (173 lb 4.5 oz) (06/20/22 0432)  Body mass index is 24.17 kg/m².  Body surface area is 1.98 meters squared.    I/O last 3 completed shifts:  In: 3480 [P.O.:240; Other:420; NG/GT:2220; IV Piggyback:600]  Out: 650 [Other:150; Stool:500]    Physical Exam  Vitals reviewed.   HENT:      Head: Normocephalic.      Mouth/Throat:      Mouth: Mucous membranes are moist.   Cardiovascular:      Rate and Rhythm: Regular rhythm.   Pulmonary:      Effort: Pulmonary effort is normal.   Abdominal:      Palpations: Abdomen is soft.   Neurological:      Mental Status: He is alert.       Significant Labs:  BMP:   Recent Labs   Lab 06/20/22 0315   GLU 88      K 4.5   CL 99   CO2 27   *   CREATININE 2.98*   CALCIUM 9.2     CBC:   Recent Labs   Lab 06/20/22 0315   WBC 14.04*   RBC 2.43*   HGB 7.5*   HCT 23.5*   *   MCV 96.7*   MCH 30.9   MCHC 31.9*        Significant Imaging:  Labs: Reviewed    Assessment/Plan:     ESRD (end stage renal disease)    6/19/2022  Dialysis as scheduled.  6/20/2022 Dialysis today.      Type 2 diabetes mellitus without complication, without long-term current use of insulin  Continue current treatment  Chronic condition  Glucoses are acceptable.  6/13/2022 Condition is stable.  6/17/2022  Stable.  6/19/2022  Continue with current treatment        Thank you for your consult. I will follow-up with patient. Please contact us if you have any additional  questions.    Edwin Pryor Jr, MD  Nephrology  [facility]

## 2022-06-20 NOTE — PROGRESS NOTES
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 male with wounds to sacral, left lateral lower leg, right anterior foot, right hand, left posterior heel, and right lateral and medial foot.  Sacral wound is healing well. Wounds on lower extremities continue to have green drainage, clean with acetic acid prior to dressing changes. Wounds continue to show signs of necrosis despite enzymatic debridement, bedside debridements, surgical interventions, adherence to turn protocols, off-loading, and low air loss mattress. Bone culture and pathology confirmative for osteomyelitis. He had amputation of right 5th toe on 5/20/2022. He was evaluated by vascular surgery and is not currently a candidate for vascular intervention. He was taken back to OR for debridement on 6/10/2022.  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 Pertinent medical history includes diabetes, hypertension, anemia, chronic respiratory failure, and infection. Factors that complicate wound healing include ESRD, chronic anemia,chronic respiratory failure, multiple co-morbidities, poor vascular supply, diabetes, decreased granulation tissue, necrosis and infection.           Review of Systems   Unable to perform ROS: Acuity of condition     Objective:     Vital Signs (Most Recent):  Temp: 99.9 °F (37.7 °C) (06/20/22 0400)  Pulse: (!) 118 (06/20/22 0900)  Resp: (!) 36 (06/20/22 0900)  BP: (!) 97/50 (06/20/22 0900)  SpO2: 100 % (06/20/22 0900) Vital Signs (24h Range):  Temp:   [99.9 °F (37.7 °C)-100.8 °F (38.2 °C)] 99.9 °F (37.7 °C)  Pulse:  [] 118  Resp:  [25-49] 36  SpO2:  [95 %-100 %] 100 %  BP: ()/(40-67) 97/50     Weight: 78.6 kg (173 lb 4.5 oz)  Body mass index is 24.17 kg/m².  No data recorded    Physical Exam  Vitals reviewed.   Constitutional:       Appearance: He is ill-appearing.      Comments: Chronically ill   HENT:      Head: Normocephalic.   Cardiovascular:      Rate and Rhythm: Regular rhythm. Tachycardia present.   Pulmonary:      Effort: Pulmonary effort is normal.   Skin:     Findings: Erythema present.      Comments: Multiple wounds   Neurological:      Mental Status: Mental status is at baseline.      Sensory: Sensory deficit present.      Motor: Weakness present.               Negative Pressure Wound Therapy  05/26/22 1300 medial (Active)   05/26/22 1300   Side:    Orientation: medial   Location: Coccyx   Additional Comments:    Removal Indication and Assessment:    Location:    SDO Location:    NPWT Type Vacuum Therapy 06/20/22 0600   Therapy Setting NPWT Continuous therapy 06/20/22 0600   Pressure Setting NPWT 125 mmHg 06/20/22 0600   Therapy Interventions NPWT Seal intact 06/20/22 0600   Sponges Inserted NPWT Black;1 06/16/22 1030   Sponges Removed NPWT Black;1 06/16/22 1030   General Output (mL) 100 06/19/22 1730   Number of days: 25            Altered Skin Integrity 01/20/22 1000 Right anterior Foot Purple or maroon localized area of discolored intact skin or non-intact skin or a blood-filled blister. (Active)   01/20/22 1000   Altered Skin Integrity Present on Admission: suspected hospital acquired   Side: Right   Orientation: anterior   Location: Foot   Wound Number:    Is this injury device related?:    Primary Wound Type:    Description of Altered Skin Integrity: Purple or maroon localized area of discolored intact skin or non-intact skin or a blood-filled blister.   Removal Indication and Assessment:    Wound Outcome:    (Retired) Wound Length  (cm):    (Retired) Wound Width (cm):    (Retired) Depth (cm):    Wound Description (Comments):    Removal Indications:    Wound Image    06/13/22 0945   Description of Altered Skin Integrity Full thickness tissue loss. Base is covered by slough and/or eschar in the wound bed 06/13/22 0945   Dressing Appearance Moist drainage 06/16/22 1030   Drainage Amount Scant 06/16/22 1030   Drainage Characteristics/Odor Serous;Chung 06/16/22 1030   Appearance Dressing in place, unable to visualize 06/20/22 0400   Tissue loss description Not applicable 06/16/22 1030   Black (%), Wound Tissue Color 0 % 06/13/22 0945   Red (%), Wound Tissue Color 40 % 06/13/22 0945   Yellow (%), Wound Tissue Color 60 % 06/13/22 0945   Periwound Area Moist 06/16/22 1030   Wound Edges Defined;Open 06/16/22 1030   Wound Length (cm) 3 cm 06/13/22 0945   Wound Width (cm) 3 cm 06/13/22 0945   Wound Depth (cm) 0.5 cm 06/13/22 0945   Wound Volume (cm^3) 4.5 cm^3 06/13/22 0945   Wound Surface Area (cm^2) 9 cm^2 06/13/22 0945   Care Cleansed with:;Wound cleanser 06/19/22 1000   Dressing Removed;Applied;Changed;Gauze, wet to moist;Gauze;Rolled gauze 06/19/22 1000   Periwound Care Moisture barrier applied 06/18/22 1000   Off Loading Other (see comments) 06/13/22 0945   Dressing Change Due 06/20/22 06/19/22 1000   Number of days: 151            Altered Skin Integrity 01/20/22 1000 Right lateral Foot Purple or maroon localized area of discolored intact skin or non-intact skin or a blood-filled blister. (Active)   01/20/22 1000   Altered Skin Integrity Present on Admission: suspected hospital acquired   Side: Right   Orientation: lateral   Location: Foot   Wound Number:    Is this injury device related?:    Primary Wound Type:    Description of Altered Skin Integrity: Purple or maroon localized area of discolored intact skin or non-intact skin or a blood-filled blister.   Removal Indication and Assessment:    Wound Outcome:    (Retired) Wound Length (cm):     (Retired) Wound Width (cm):    (Retired) Depth (cm):    Wound Description (Comments):    Removal Indications:    Wound Image    06/13/22 0945   Description of Altered Skin Integrity Full thickness tissue loss with exposed bone, tendon, or muscle. Often includes undermining and tunneling. May extend into muscle and/or supporting structures. 06/13/22 0945   Dressing Appearance Moist drainage 06/16/22 1030   Drainage Amount Small 06/16/22 1030   Drainage Characteristics/Odor Serous;Green 06/16/22 1030   Appearance Dressing in place, unable to visualize 06/20/22 0400   Tissue loss description Not applicable 06/16/22 1030   Black (%), Wound Tissue Color 0 % 06/13/22 0945   Red (%), Wound Tissue Color 50 % 06/13/22 0945   Yellow (%), Wound Tissue Color 50 % 06/13/22 0945   Periwound Area Moist 06/16/22 1030   Wound Edges Defined 06/16/22 1030   Wound Length (cm) 3 cm 06/13/22 0945   Wound Width (cm) 6 cm 06/13/22 0945   Wound Depth (cm) 0.5 cm 06/13/22 0945   Wound Volume (cm^3) 9 cm^3 06/13/22 0945   Wound Surface Area (cm^2) 18 cm^2 06/13/22 0945   Care Cleansed with:;Wound cleanser 06/19/22 1000   Dressing Removed;Applied;Changed;Gauze, wet to moist;Gauze;Rolled gauze 06/19/22 1000   Periwound Care Moisturizer applied 06/18/22 1000   Off Loading Other (see comments) 06/13/22 0945   Dressing Change Due 06/20/22 06/19/22 1000   Number of days: 151            Altered Skin Integrity 02/08/22 1300 Left posterior Heel Other (comment) Full thickness tissue loss. Base is covered by slough and/or eschar in the wound bed (Active)   02/08/22 1300   Altered Skin Integrity Present on Admission: yes   Side: Left   Orientation: posterior   Location: Heel   Wound Number:    Is this injury device related?: No   Primary Wound Type: Other   Description of Altered Skin Integrity: Full thickness tissue loss. Base is covered by slough and/or eschar in the wound bed   Removal Indication and Assessment:    Wound Outcome:    (Retired) Wound  Length (cm):    (Retired) Wound Width (cm):    (Retired) Depth (cm):    Wound Description (Comments):    Removal Indications:    Wound Image    06/13/22 0945   Description of Altered Skin Integrity Full thickness tissue loss with exposed bone, tendon, or muscle. Often includes undermining and tunneling. May extend into muscle and/or supporting structures. 06/13/22 0945   Dressing Appearance Moist drainage 06/16/22 1030   Drainage Amount Scant 06/16/22 1030   Drainage Characteristics/Odor Green;No odor;Serous 06/16/22 1030   Appearance Dressing in place, unable to visualize 06/20/22 0400   Tissue loss description Not applicable 06/16/22 1030   Black (%), Wound Tissue Color 50 % 06/13/22 0945   Red (%), Wound Tissue Color 25 % 06/13/22 0945   Yellow (%), Wound Tissue Color 25 % 06/13/22 0945   Periwound Area Moist 06/16/22 1030   Wound Edges Defined;Open 06/16/22 1030   Wound Length (cm) 7.5 cm 06/13/22 0945   Wound Width (cm) 6.5 cm 06/13/22 0945   Wound Depth (cm) 0.5 cm 06/13/22 0945   Wound Volume (cm^3) 24.375 cm^3 06/13/22 0945   Wound Surface Area (cm^2) 48.75 cm^2 06/13/22 0945   Care Cleansed with:;Wound cleanser 06/19/22 1000   Dressing Removed;Applied;Changed;Collagen;Gauze, wet to moist;Gauze;Rolled gauze 06/19/22 1000   Periwound Care Moisture barrier applied 06/18/22 1000   Off Loading Other (see comments) 06/13/22 0945   Dressing Change Due 06/19/22 06/18/22 1000   Number of days: 132            Altered Skin Integrity 02/15/22 1300 Left lateral Leg Traumatic Partial thickness tissue loss. Shallow open ulcer with a red or pink wound bed, without slough. Intact or Open/Ruptured Serum-filled blister. (Active)   02/15/22 1300   Altered Skin Integrity Present on Admission: suspected hospital acquired   Side: Left   Orientation: lateral   Location: Leg   Wound Number:    Is this injury device related?: No   Primary Wound Type: Traumatic   Description of Altered Skin Integrity: Partial thickness tissue loss.  Shallow open ulcer with a red or pink wound bed, without slough. Intact or Open/Ruptured Serum-filled blister.   Removal Indication and Assessment:    Wound Outcome:    (Retired) Wound Length (cm):    (Retired) Wound Width (cm):    (Retired) Depth (cm):    Wound Description (Comments):    Removal Indications:    Wound Image    05/31/22 1300   Description of Altered Skin Integrity Full thickness tissue loss. Base is covered by slough and/or eschar in the wound bed 04/26/22 1235   Dressing Appearance Open to air;Dry;Intact 06/20/22 0400   Drainage Amount None 06/13/22 0945   Appearance Dressing in place, unable to visualize 06/20/22 0000   Tissue loss description Not applicable 06/13/22 0945   Black (%), Wound Tissue Color 10 % 04/26/22 1235   Red (%), Wound Tissue Color 100 % 05/31/22 1300   Yellow (%), Wound Tissue Color 0 % 04/26/22 1235   Periwound Area Dry 06/13/22 0945   Wound Edges Defined 06/13/22 0945   Care Cleansed with:;Soap and water 06/08/22 1200   Dressing Applied;Calcium alginate 06/08/22 1200   Off Loading Other (see comments) 06/07/22 1235   Dressing Change Due 05/09/22 05/08/22 1200   Number of days: 125            Altered Skin Integrity 02/28/22 1300 Left anterior Foot Traumatic Partial thickness tissue loss. Shallow open ulcer with a red or pink wound bed, without slough. Intact or Open/Ruptured Serum-filled blister. (Active)   02/28/22 1300   Altered Skin Integrity Present on Admission: suspected hospital acquired   Side: Left   Orientation: anterior   Location: Foot   Wound Number:    Is this injury device related?: No   Primary Wound Type: Traumatic   Description of Altered Skin Integrity: Partial thickness tissue loss. Shallow open ulcer with a red or pink wound bed, without slough. Intact or Open/Ruptured Serum-filled blister.   Removal Indication and Assessment:    Wound Outcome:    (Retired) Wound Length (cm):    (Retired) Wound Width (cm):    (Retired) Depth (cm):    Wound Description  (Comments):    Removal Indications:    Wound Image    06/13/22 0945   Description of Altered Skin Integrity Partial thickness tissue loss. Shallow open ulcer with a red or pink wound bed, without slough. Intact or Open/Ruptured Serum-filled blister. 05/12/22 1200   Dressing Appearance Open to air;Dry 06/20/22 0400   Drainage Amount None 06/15/22 0945   Drainage Characteristics/Odor Serous 05/10/22 0742   Appearance Dressing in place, unable to visualize 06/20/22 0000   Tissue loss description Partial thickness 05/04/22 1300   Black (%), Wound Tissue Color 0 % 06/13/22 0945   Red (%), Wound Tissue Color 0 % 06/13/22 0945   Yellow (%), Wound Tissue Color 0 % 06/13/22 0945   Periwound Area Dry 06/13/22 0945   Wound Edges Defined 06/13/22 0945   Care Cleansed with:;Soap and water 06/15/22 0945   Dressing Changed 05/22/22 1400   Off Loading Other (see comments) 06/07/22 1235   Dressing Change Due 05/09/22 05/08/22 1200   Number of days: 112            Altered Skin Integrity 03/15/22 1000 Right lateral Malleolus Ulceration Purple or maroon localized area of discolored intact skin or non-intact skin or a blood-filled blister. (Active)   03/15/22 1000   Altered Skin Integrity Present on Admission: suspected hospital acquired   Side: Right   Orientation: lateral   Location: Malleolus   Wound Number:    Is this injury device related?: Yes   Primary Wound Type: Ulceration   Description of Altered Skin Integrity: Purple or maroon localized area of discolored intact skin or non-intact skin or a blood-filled blister.   Removal Indication and Assessment:    Wound Outcome:    (Retired) Wound Length (cm):    (Retired) Wound Width (cm):    (Retired) Depth (cm):    Wound Description (Comments):    Removal Indications:    Wound Image    06/13/22 0945   Description of Altered Skin Integrity Full thickness tissue loss. Subcutaneous fat may be visible but bone, tendon or muscle are not exposed 06/13/22 0945   Dressing Appearance Moist  drainage 06/16/22 1030   Drainage Amount Small 06/16/22 1030   Drainage Characteristics/Odor Serosanguineous;No odor 06/16/22 1030   Appearance Dressing in place, unable to visualize 06/20/22 0400   Tissue loss description Full thickness 06/16/22 1030   Black (%), Wound Tissue Color 0 % 06/13/22 0945   Red (%), Wound Tissue Color 99 % 06/13/22 0945   Yellow (%), Wound Tissue Color 1 % 06/13/22 0945   Periwound Area Moist 06/16/22 1030   Wound Edges Defined;Open 06/16/22 1030   Wound Length (cm) 3 cm 06/13/22 0945   Wound Width (cm) 2.5 cm 06/13/22 0945   Wound Depth (cm) 0.2 cm 06/13/22 0945   Wound Volume (cm^3) 1.5 cm^3 06/13/22 0945   Wound Surface Area (cm^2) 7.5 cm^2 06/13/22 0945   Care Cleansed with:;Wound cleanser 06/19/22 1000   Dressing Removed;Applied;Changed;Collagen;Gauze, wet to moist;Gauze;Rolled gauze 06/19/22 1000   Periwound Care Moisture barrier applied 06/16/22 1030   Off Loading Other (see comments) 06/13/22 0945   Dressing Change Due 06/20/22 06/19/22 1000   Number of days: 97            Altered Skin Integrity 03/15/22 1000 Right lateral Leg Ulceration Purple or maroon localized area of discolored intact skin or non-intact skin or a blood-filled blister. (Active)   03/15/22 1000   Altered Skin Integrity Present on Admission: suspected hospital acquired   Side: Right   Orientation: lateral   Location: Leg   Wound Number:    Is this injury device related?: No   Primary Wound Type: Ulceration   Description of Altered Skin Integrity: Purple or maroon localized area of discolored intact skin or non-intact skin or a blood-filled blister.   Removal Indication and Assessment:    Wound Outcome:    (Retired) Wound Length (cm):    (Retired) Wound Width (cm):    (Retired) Depth (cm):    Wound Description (Comments):    Removal Indications:    Wound Image    06/13/22 0945   Description of Altered Skin Integrity Full thickness tissue loss with exposed bone, tendon, or muscle. Often includes undermining and  tunneling. May extend into muscle and/or supporting structures. 06/13/22 0945   Dressing Appearance Moist drainage 06/16/22 1030   Drainage Amount Small 06/16/22 1030   Drainage Characteristics/Odor Serosanguineous;No odor 06/16/22 1030   Appearance Dressing in place, unable to visualize 06/20/22 0400   Tissue loss description Not applicable 06/16/22 1030   Black (%), Wound Tissue Color 0 % 06/13/22 0945   Red (%), Wound Tissue Color 95 % 06/13/22 0945   Yellow (%), Wound Tissue Color 5 % 06/13/22 0945   Periwound Area Moist 06/16/22 1030   Wound Edges Defined;Open 06/16/22 1030   Wound Length (cm) 12 cm 06/13/22 0945   Wound Width (cm) 3 cm 06/13/22 0945   Wound Depth (cm) 0.5 cm 06/13/22 0945   Wound Volume (cm^3) 18 cm^3 06/13/22 0945   Wound Surface Area (cm^2) 36 cm^2 06/13/22 0945   Care Cleansed with:;Wound cleanser 06/19/22 1000   Dressing Removed;Applied;Changed;Collagen;Gauze, wet to moist;Gauze;Rolled gauze 06/19/22 1000   Periwound Care Moisture barrier applied 06/16/22 1030   Off Loading Other (see comments) 06/13/22 0945   Dressing Change Due 06/20/22 06/19/22 1000   Number of days: 97            Altered Skin Integrity 03/22/22 1300 posterior Sacral spine Other (comment) Full thickness tissue loss. Base is covered by slough and/or eschar in the wound bed (Active)   03/22/22 1300   Altered Skin Integrity Present on Admission: yes   Side:    Orientation: posterior   Location: Sacral spine   Wound Number:    Is this injury device related?: No   Primary Wound Type: Other   Description of Altered Skin Integrity: Full thickness tissue loss. Base is covered by slough and/or eschar in the wound bed   Removal Indication and Assessment:    Wound Outcome:    (Retired) Wound Length (cm):    (Retired) Wound Width (cm):    (Retired) Depth (cm):    Wound Description (Comments):    Removal Indications:    Wound Image    06/13/22 0945   Description of Altered Skin Integrity Full thickness tissue loss with exposed bone,  tendon, or muscle. Often includes undermining and tunneling. May extend into muscle and/or supporting structures. 06/13/22 0945   Dressing Appearance Moist drainage 06/16/22 1030   Drainage Amount Small 06/16/22 1030   Drainage Characteristics/Odor Serosanguineous;No odor 06/16/22 1030   Appearance Dressing in place, unable to visualize 06/20/22 0400   Tissue loss description Full thickness 06/16/22 1030   Black (%), Wound Tissue Color 0 % 06/13/22 0945   Red (%), Wound Tissue Color 90 % 06/13/22 0945   Yellow (%), Wound Tissue Color 10 % 06/13/22 0945   Periwound Area Hemosiderin Staining;Moist 06/16/22 1030   Wound Edges Defined;Open 06/16/22 1030   Wound Length (cm) 14 cm 06/13/22 0945   Wound Width (cm) 13 cm 06/13/22 0945   Wound Depth (cm) 2 cm 06/13/22 0945   Wound Volume (cm^3) 364 cm^3 06/13/22 0945   Wound Surface Area (cm^2) 182 cm^2 06/13/22 0945   Care Cleansed with:;Soap and water;Wound cleanser;Applied:;Removed:;Skin Barrier;Moisturizing agent 06/16/22 1030   Dressing Removed;Applied;Changed;Collagen;Gauze, wet to moist;Gauze 06/16/22 1030   Packing packed with;strip gauze;other (see comment) 05/22/22 1400   Periwound Care Skin barrier film applied 06/16/22 1030   Dressing Change Due 06/20/22 06/19/22 1000   Number of days: 90            Altered Skin Integrity 04/05/22 1130 Right anterior Toe, second Traumatic (Active)   04/05/22 1130   Altered Skin Integrity Present on Admission: suspected hospital acquired   Side: Right   Orientation: anterior   Location: Toe, second   Wound Number:    Is this injury device related?: No   Primary Wound Type: Traumatic   Description of Altered Skin Integrity:    Removal Indication and Assessment:    Wound Outcome:    (Retired) Wound Length (cm):    (Retired) Wound Width (cm):    (Retired) Depth (cm):    Wound Description (Comments):    Removal Indications:    Wound Image    06/13/22 0945   Dressing Appearance Open to air 06/20/22 0400   Drainage Amount None 06/19/22  2000   Drainage Characteristics/Odor Creamy 04/17/22 0800   Appearance Dressing in place, unable to visualize 06/19/22 0400   Tissue loss description Not applicable 06/15/22 0945   Wound Edges Undefined 06/15/22 0945   Care Cleansed with:;Soap and water 06/15/22 0945   Dressing Applied;Collagen 06/02/22 1200   Number of days: 76            Altered Skin Integrity 04/07/22 1130 posterior Buttocks Incontinence associated dermatitis (Active)   04/07/22 1130   Altered Skin Integrity Present on Admission: suspected hospital acquired   Side:    Orientation: posterior   Location: Buttocks   Wound Number:    Is this injury device related?: No   Primary Wound Type: Incontinence   Description of Altered Skin Integrity:    Removal Indication and Assessment:    Wound Outcome:    (Retired) Wound Length (cm):    (Retired) Wound Width (cm):    (Retired) Depth (cm):    Wound Description (Comments):    Removal Indications:    Wound Image    06/13/22 0945   Description of Altered Skin Integrity Partial thickness tissue loss. Shallow open ulcer with a red or pink wound bed, without slough. Intact or Open/Ruptured Serum-filled blister. 05/31/22 1300   Dressing Appearance Open to air;Dry 06/13/22 0945   Drainage Amount None 06/13/22 0945   Drainage Characteristics/Odor No odor 06/13/22 0945   Appearance Dressing in place, unable to visualize 06/20/22 0400   Tissue loss description Partial thickness 05/12/22 1200   Black (%), Wound Tissue Color 0 % 05/31/22 1300   Red (%), Wound Tissue Color 100 % 05/31/22 1300   Yellow (%), Wound Tissue Color 0 % 05/31/22 1300   Periwound Area Dry 06/13/22 0945   Wound Edges Defined 06/13/22 0945   Care Cleansed with:;Soap and water;Moisturizing agent;Applied: 06/13/22 0945   Dressing Removed;Applied 05/23/22 0000   Packing packed with;other (see comment) 05/23/22 0000   Periwound Care Moisture barrier applied 05/27/22 0705   Dressing Change Due 06/20/22 06/19/22 1000   Number of days: 74             Incision/Site 01/04/22 1444 Neck (Active)   01/04/22 1444   Present Prior to Hospital Arrival?: No   Side:    Location: Neck   Orientation:    Incision Type:    Closure Method:    Additional Comments:    Removal Indication and Assessment:    Wound Outcome:    Removal Indications:    Incision WDL WDL 06/20/22 0400   Dressing Appearance Open to air 06/20/22 0400   Drainage Amount None 06/20/22 0400   Number of days: 167         Assessment and Plan      Osteomyelitis   ID following for antibiotics, pending pathology and bone culture reports  Wounds to lower extremities  Clean wound with acetic acid  Pat dry  Apply sensicare around wound  Apply santyl naz thick to wound bed, cover with vashe/saline moistened 4x4  Cover and secure with 4x4s, pascual, and paper tape  Change daily  Turn every two hours  Low air loss mattress  Keep pressure off wound  TAP system   Waffle Boots  Pressure ulcer of sacral region, stage 4  NPWT  Change M, TH  Turn every two hours  Low air loss mattress  Keep pressure off wound  TAP system           Signature:  HERSON Viveros  Wound Care    Date of encounter: 06/20/2022

## 2022-06-20 NOTE — PROGRESS NOTES
Wound care note;  Patient skin was evaluated today with dressing change.  Patient in bed, eyes open.  ELSA Young FNP assessed all wounds today  Sacral wound cont with pale pink slow granular tissue. Outer edges dark, dry   Wounds to Left heel, Right lateral 5th toe, Right medial foot and Right lateral leg with green drainage noted. No change in wounds. Cont to progress , not improving.   Right heel and Left  Dorsal foot with dark brown discolored area noted   See ELSA Young FNP assessment.  Applied santly to lower ext wounds today. Start Acetic acid to areas in am  Cont NPWT to sacral , change on Mon/Thurs  Cont turn protocol  Waffle boots  On low air loss mattress  Wound care team to follow

## 2022-06-20 NOTE — SUBJECTIVE & OBJECTIVE
Interval History: No acute events overnight. He is currently resting comfortably. Oxygenating adequately on 2L nasal cannula. He is afebrile this am and vital signs are stable.     Objective:     Vital Signs (Most Recent):  Temp: 99.9 °F (37.7 °C) (06/20/22 0400)  Pulse: (!) 114 (06/20/22 0751)  Resp: (!) 38 (06/20/22 0751)  BP: (!) 97/46 (06/20/22 0700)  SpO2: 100 % (06/20/22 0751)   Vital Signs (24h Range):  Temp:  [99.9 °F (37.7 °C)-100.8 °F (38.2 °C)] 99.9 °F (37.7 °C)  Pulse:  [] 114  Resp:  [25-49] 38  SpO2:  [95 %-100 %] 100 %  BP: ()/(40-67) 97/46     Weight: 78.6 kg (173 lb 4.5 oz)  Body mass index is 24.17 kg/m².      Intake/Output Summary (Last 24 hours) at 6/20/2022 0815  Last data filed at 6/20/2022 0400  Gross per 24 hour   Intake 1950 ml   Output 200 ml   Net 1750 ml         Physical Exam  Vitals reviewed.   Constitutional:       General: He is not in acute distress.     Appearance: He is not ill-appearing.      Comments: Chronically ill appearing   HENT:      Right Ear: External ear normal.      Left Ear: External ear normal.      Mouth/Throat:      Mouth: Mucous membranes are moist.   Eyes:      Conjunctiva/sclera: Conjunctivae normal.   Neck:      Trachea: Tracheostomy present.   Cardiovascular:      Rate and Rhythm: Normal rate and regular rhythm.   Pulmonary:      Effort: Pulmonary effort is normal.      Breath sounds: Normal breath sounds.   Abdominal:      General: Bowel sounds are normal.      Palpations: Abdomen is soft.   Musculoskeletal:         General: Normal range of motion.      Cervical back: Normal range of motion and neck supple.   Skin:     General: Skin is warm and dry.      Capillary Refill: Capillary refill takes less than 2 seconds.   Neurological:      Mental Status: He is alert. Mental status is at baseline.   Psychiatric:         Mood and Affect: Mood normal.       Vents:  Vent Mode: Standby (05/28/22 0415)  Ventilator Initiated: No (04/23/22 1934)  Set Rate: 0  BPM (05/18/22 0516)  Vt Set: 0 mL (05/18/22 0516)  Pressure Support: 15 cmH20 (05/22/22 0044)  PEEP/CPAP: 5 cmH20 (05/22/22 0044)  Oxygen Concentration (%): 28 (06/20/22 0031)  Peak Airway Pressure: 21 cmH2O (05/22/22 0044)  Plateau Pressure: 20 cmH20 (03/07/22 0500)  Total Ve: 7.8 mL (05/22/22 0044)  F/VT Ratio<105 (RSBI): (!) 22.64 (05/22/22 0044)    Lines/Drains/Airways       Central Venous Catheter Line  Duration                  Hemodialysis Catheter 12/30/21 0900 right internal jugular 171 days              Drain  Duration                  Colostomy 12/18/21 1030 Descending/sigmoid  days         Gastrostomy/Enterostomy 03/09/22 1436 Percutaneous endoscopic gastrostomy (PEG) midline feeding 102 days              Airway  Duration                  Surgical Airway 04/27/22 0856 Shiley;Other (Comment) Cuffed;Long 53 days              Peripheral Intravenous Line  Duration                  Peripheral IV - Single Lumen 06/03/22 1527 18 G Anterior;Right Forearm 16 days                    Significant Labs:    CBC/Anemia Profile:  Recent Labs   Lab 06/20/22  0315   WBC 14.04*   HGB 7.5*   HCT 23.5*   *   MCV 96.7*   RDW 15.4*          Chemistries:  Recent Labs   Lab 06/20/22  0315      K 4.5   CL 99   CO2 27   *   CREATININE 2.98*   CALCIUM 9.2         All pertinent labs within the past 24 hours have been reviewed.    Significant Imaging:  I have reviewed all pertinent imaging results/findings within the past 24 hours.

## 2022-06-20 NOTE — PROGRESS NOTES
Pharmacokinetic Follow Up: Tobramycin    Assessment of levels:   Today's random tobra level of 0.8 is within the desired range of </= 2    Regimen Plan:   Tobra 220 mg IV x 1 today with a random tobra level ordered for 6/24 at 0600    Drug levels (last 3 results):  No results for input(s): AMIKACINPEAK, AMIKACINTROU, AMIKACINRAND, AMIKACIN in the last 72 hours.    No results for input(s): GENTAMICIN, GENTPEAK, GENTTROUGH, GENT10, GENT12, GENT8, GENTRANDOM in the last 72 hours.    Recent Labs   Lab Result Units 06/20/22  0315   Tobramycin, Random µg/mL 0.8       Pharmacy will continue to monitor.    Please contact pharmacy at extension [phone] with any questions regarding this assessment.    Patient brief summary:  Og Garcia is a 67 y.o. male initiated on aminoglycoside therapy for treatment of bone/joint infection    Drug Allergies:   Review of patient's allergies indicates:  No Known Allergies    Actual Body Weight:   78.6 kg    Adjust Body Weight:   76.6 kg    Ideal Body Weight:  75.3 kg    Renal Function:   Estimated Creatinine Clearance: 25.6 mL/min (A) (based on SCr of 2.98 mg/dL (H)).,     Dialysis Method (if applicable):  intermittent HD    CBC (last 72 hours):  Recent Labs   Lab Result Units 06/20/22  0315   WBC K/uL 14.04*   Hemoglobin g/dL 7.5*   Hematocrit % 23.5*   Platelet Count K/uL 448*   Lymphocytes % % 21.2*   Lymphocytes, Man % % 23*   Monocytes % % 15.2*   Monocytes, Man % % 14*   Eosinophils % % 5.4*   Eosinophils, Man % % 2   Basophils % % 0.4       Metabolic Panel (last 72 hours):  Recent Labs   Lab Result Units 06/20/22  0315   Sodium mmol/L 138   Potassium mmol/L 4.5   Chloride mmol/L 99   CO2 mmol/L 27   Glucose mg/dL 88   BUN mg/dL 115*   Creatinine mg/dL 2.98*       Aminoglycoside Administrations:  aminoglycosides given in last 96 hours        No antibiotic orders with administrations found.                    Microbiologic Results:  Microbiology Results (last 7 days)       Procedure  Component Value Units Date/Time    Blood culture [[id number]] Collected: 06/12/22 1654    Order Status: Completed Specimen: Blood Updated: 06/18/22 0730     Culture, Blood No Growth at 5 days    Blood culture [[id number]] Collected: 06/12/22 1654    Order Status: Completed Specimen: Blood Updated: 06/18/22 0730     Culture, Blood No Growth at 5 days

## 2022-06-20 NOTE — ASSESSMENT & PLAN NOTE
He is now doing well with nasal cannula  Trach capped  Still having some issues with secretions at times  Will hold off on pulling trach for now  Reassess on Monday  added scopolamine for secretions   Hopefully we will be able to pull trach in the next day or two

## 2022-06-21 NOTE — SUBJECTIVE & OBJECTIVE
Interval History: No acute events overnight. He is currently resting comfortably. Oxygenating adequately on 2L nasal cannula. He is afebrile this am and vital signs are stable.     Objective:     Vital Signs (Most Recent):  Temp: 98.9 °F (37.2 °C) (06/21/22 0700)  Pulse: (!) 113 (06/21/22 1030)  Resp: (!) 31 (06/21/22 1000)  BP: (!) 101/49 (06/21/22 1030)  SpO2: 100 % (06/21/22 1030)   Vital Signs (24h Range):  Temp:  [98.9 °F (37.2 °C)-100.6 °F (38.1 °C)] 98.9 °F (37.2 °C)  Pulse:  [106-127] 113  Resp:  [15-45] 31  SpO2:  [96 %-100 %] 100 %  BP: ()/(35-57) 101/49     Weight: 75.2 kg (165 lb 12.6 oz)  Body mass index is 23.12 kg/m².      Intake/Output Summary (Last 24 hours) at 6/21/2022 1155  Last data filed at 6/21/2022 0800  Gross per 24 hour   Intake 1430 ml   Output 2100 ml   Net -670 ml         Physical Exam  Vitals reviewed.   Constitutional:       General: He is not in acute distress.     Appearance: He is not ill-appearing.      Comments: Chronically ill appearing   HENT:      Right Ear: External ear normal.      Left Ear: External ear normal.      Mouth/Throat:      Mouth: Mucous membranes are moist.   Eyes:      Conjunctiva/sclera: Conjunctivae normal.   Neck:      Trachea: Tracheostomy present.   Cardiovascular:      Rate and Rhythm: Normal rate and regular rhythm.   Pulmonary:      Effort: Pulmonary effort is normal.      Breath sounds: Normal breath sounds.   Abdominal:      General: Bowel sounds are normal.      Palpations: Abdomen is soft.   Musculoskeletal:         General: Normal range of motion.      Cervical back: Normal range of motion and neck supple.   Skin:     General: Skin is warm and dry.      Capillary Refill: Capillary refill takes less than 2 seconds.   Neurological:      Mental Status: He is alert. Mental status is at baseline.   Psychiatric:         Mood and Affect: Mood normal.       Vents:  Vent Mode: Standby (05/28/22 8237)  Ventilator Initiated: No (04/23/22 1934)  Set Rate:  0 BPM (05/18/22 0516)  Vt Set: 0 mL (05/18/22 0516)  Pressure Support: 15 cmH20 (05/22/22 0044)  PEEP/CPAP: 5 cmH20 (05/22/22 0044)  Oxygen Concentration (%): 28 (06/21/22 0852)  Peak Airway Pressure: 21 cmH2O (05/22/22 0044)  Plateau Pressure: 20 cmH20 (03/07/22 0500)  Total Ve: 7.8 mL (05/22/22 0044)  F/VT Ratio<105 (RSBI): (!) 22.64 (05/22/22 0044)    Lines/Drains/Airways       Central Venous Catheter Line  Duration                  Hemodialysis Catheter 12/30/21 0900 right internal jugular 173 days              Drain  Duration                  Colostomy 12/18/21 1030 Descending/sigmoid  days         Gastrostomy/Enterostomy 03/09/22 1436 Percutaneous endoscopic gastrostomy (PEG) midline feeding 103 days              Airway  Duration                  Surgical Airway 04/27/22 0856 Shiley;Other (Comment) Cuffed;Long 55 days              Peripheral Intravenous Line  Duration                  Peripheral IV - Single Lumen 06/03/22 1527 18 G Anterior;Right Forearm 17 days                    Significant Labs:    CBC/Anemia Profile:  Recent Labs   Lab 06/20/22  0315   WBC 14.04*   HGB 7.5*   HCT 23.5*   *   MCV 96.7*   RDW 15.4*          Chemistries:  Recent Labs   Lab 06/20/22  0315      K 4.5   CL 99   CO2 27   *   CREATININE 2.98*   CALCIUM 9.2         All pertinent labs within the past 24 hours have been reviewed.    Significant Imaging:  I have reviewed all pertinent imaging results/findings within the past 24 hours.

## 2022-06-21 NOTE — SUBJECTIVE & OBJECTIVE
Interval History: The patient is resting.  No acute changes.  He has been hemodynamically stable.    Review of patient's allergies indicates:  No Known Allergies  Current Facility-Administered Medications   Medication Frequency    0.9%  NaCl infusion (for blood administration) Q24H PRN    0.9%  NaCl infusion PRN    acetaminophen tablet 500 mg Q6H PRN    acetylcysteine 200 mg/ml (20%) solution 2 mL TID WAKE    albuterol nebulizer solution 2.5 mg Q4H PRN    albuterol-ipratropium 2.5 mg-0.5 mg/3 mL nebulizer solution 3 mL Q6H    bisacodyL EC tablet 10 mg Daily PRN    collagenase ointment Daily PRN    dextrose 50% injection 12.5 g PRN    diltiaZEM tablet 90 mg Q6H    epoetin beth-epbx injection 10,000 Units Every Mon, Wed, Fri    glucagon (human recombinant) injection 1 mg PRN    guaiFENesin 100 mg/5 ml syrup 200 mg Q6H    heparin (porcine) injection 4,000 Units PRN    heparin (porcine) injection 5,000 Units Q8H    insulin aspart U-100 injection 1-10 Units Q6H    insulin detemir U-100 injection 15 Units QHS    linezolid tablet 600 mg Q12H    meropenem (MERREM) 500 mg in sodium chloride 0.9 % 100 mL IVPB (MB+) Q24H    metoprolol tartrate (LOPRESSOR) split tablet 12.5 mg BID    pantoprazole suspension 40 mg Daily    potassium iodide 1 gram/mL solution 5 drop QID    scopolamine 1.3-1.5 mg (1 mg over 3 days) 1 patch Q3 Days    sevelamer carbonate pwpk 0.8 g TID WM    simethicone chewable tablet 80 mg TID PRN    sodium chloride 0.9% bolus 250 mL PRN    tobramycin - pharmacy to dose pharmacy to manage frequency       Objective:     Vital Signs (Most Recent):  Temp: 98.2 °F (36.8 °C) (06/21/22 1100)  Pulse: 110 (06/21/22 1330)  Resp: (!) 35 (06/21/22 1330)  BP: (!) 100/50 (06/21/22 1330)  SpO2: 99 % (06/21/22 1330)  O2 Device (Oxygen Therapy): nasal cannula w/ humidification (06/21/22 1228)   Vital Signs (24h Range):  Temp:  [98.2 °F (36.8 °C)-100.6 °F (38.1 °C)] 98.2 °F (36.8 °C)  Pulse:  [106-127] 110  Resp:  [15-45]  35  SpO2:  [96 %-100 %] 99 %  BP: ()/(35-61) 100/50     Weight: 75.2 kg (165 lb 12.6 oz) (06/21/22 0500)  Body mass index is 23.12 kg/m².  Body surface area is 1.94 meters squared.    I/O last 3 completed shifts:  In: 1980 [P.O.:240; Other:120; NG/GT:1170; IV Piggyback:450]  Out: 2200 [Other:1800; Stool:400]    Physical Exam  Vitals reviewed.   HENT:      Head: Normocephalic.      Mouth/Throat:      Mouth: Mucous membranes are moist.   Cardiovascular:      Rate and Rhythm: Regular rhythm.      Pulses: Normal pulses.   Pulmonary:      Effort: Pulmonary effort is normal.   Abdominal:      Palpations: Abdomen is soft.   Neurological:      Mental Status: He is alert.       Significant Labs:  BMP:   Recent Labs   Lab 06/20/22  0315   GLU 88      K 4.5   CL 99   CO2 27   *   CREATININE 2.98*   CALCIUM 9.2     CBC:   Recent Labs   Lab 06/20/22  0315   WBC 14.04*   RBC 2.43*   HGB 7.5*   HCT 23.5*   *   MCV 96.7*   MCH 30.9   MCHC 31.9*        Significant Imaging:  Labs: Reviewed

## 2022-06-21 NOTE — PROGRESS NOTES
[facility]  Pulmonology  Progress Note    Patient Name: Og Garcia  MRN: [id number]  Admission Date: 12/23/2021  Hospital Length of Stay: 180 days  Code Status: Prior  Attending Provider: Cecil Abernathy DO  Primary Care Provider: Hector Arndt DNP, FNP-C   Principal Problem: Denice gangrene    Subjective:     Interval History: No acute events overnight. He is currently resting comfortably. Oxygenating adequately on 2L nasal cannula. He is afebrile this am and vital signs are stable.     Objective:     Vital Signs (Most Recent):  Temp: 98.9 °F (37.2 °C) (06/21/22 0700)  Pulse: (!) 113 (06/21/22 1030)  Resp: (!) 31 (06/21/22 1000)  BP: (!) 101/49 (06/21/22 1030)  SpO2: 100 % (06/21/22 1030)   Vital Signs (24h Range):  Temp:  [98.9 °F (37.2 °C)-100.6 °F (38.1 °C)] 98.9 °F (37.2 °C)  Pulse:  [106-127] 113  Resp:  [15-45] 31  SpO2:  [96 %-100 %] 100 %  BP: ()/(35-57) 101/49     Weight: 75.2 kg (165 lb 12.6 oz)  Body mass index is 23.12 kg/m².      Intake/Output Summary (Last 24 hours) at 6/21/2022 1155  Last data filed at 6/21/2022 0800  Gross per 24 hour   Intake 1430 ml   Output 2100 ml   Net -670 ml         Physical Exam  Vitals reviewed.   Constitutional:       General: He is not in acute distress.     Appearance: He is not ill-appearing.      Comments: Chronically ill appearing   HENT:      Right Ear: External ear normal.      Left Ear: External ear normal.      Mouth/Throat:      Mouth: Mucous membranes are moist.   Eyes:      Conjunctiva/sclera: Conjunctivae normal.   Neck:      Trachea: Tracheostomy present.   Cardiovascular:      Rate and Rhythm: Normal rate and regular rhythm.   Pulmonary:      Effort: Pulmonary effort is normal.      Breath sounds: Normal breath sounds.   Abdominal:      General: Bowel sounds are normal.      Palpations: Abdomen is soft.   Musculoskeletal:         General: Normal range of motion.      Cervical back: Normal range of motion and neck  supple.   Skin:     General: Skin is warm and dry.      Capillary Refill: Capillary refill takes less than 2 seconds.   Neurological:      Mental Status: He is alert. Mental status is at baseline.   Psychiatric:         Mood and Affect: Mood normal.       Vents:  Vent Mode: Standby (05/28/22 0415)  Ventilator Initiated: No (04/23/22 1934)  Set Rate: 0 BPM (05/18/22 0516)  Vt Set: 0 mL (05/18/22 0516)  Pressure Support: 15 cmH20 (05/22/22 0044)  PEEP/CPAP: 5 cmH20 (05/22/22 0044)  Oxygen Concentration (%): 28 (06/21/22 0852)  Peak Airway Pressure: 21 cmH2O (05/22/22 0044)  Plateau Pressure: 20 cmH20 (03/07/22 0500)  Total Ve: 7.8 mL (05/22/22 0044)  F/VT Ratio<105 (RSBI): (!) 22.64 (05/22/22 0044)    Lines/Drains/Airways       Central Venous Catheter Line  Duration                  Hemodialysis Catheter 12/30/21 0900 right internal jugular 173 days              Drain  Duration                  Colostomy 12/18/21 1030 Descending/sigmoid  days         Gastrostomy/Enterostomy 03/09/22 1436 Percutaneous endoscopic gastrostomy (PEG) midline feeding 103 days              Airway  Duration                  Surgical Airway 04/27/22 0856 Shiley;Other (Comment) Cuffed;Long 55 days              Peripheral Intravenous Line  Duration                  Peripheral IV - Single Lumen 06/03/22 1527 18 G Anterior;Right Forearm 17 days                    Significant Labs:    CBC/Anemia Profile:  Recent Labs   Lab 06/20/22 0315   WBC 14.04*   HGB 7.5*   HCT 23.5*   *   MCV 96.7*   RDW 15.4*          Chemistries:  Recent Labs   Lab 06/20/22 0315      K 4.5   CL 99   CO2 27   *   CREATININE 2.98*   CALCIUM 9.2         All pertinent labs within the past 24 hours have been reviewed.    Significant Imaging:  I have reviewed all pertinent imaging results/findings within the past 24 hours.    Assessment/Plan:     * Denice gangrene  - Stable   - wound vac     Osteomyelitis  - Continue IV antibiotics per ID  recommendations - stop date 07/01   - He has been treated for multidrug resistant bacteria     ID note reviewed and appreciated:  1. Continue tobramycin and meropenem, both renally dosed, until 7/1  2. 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 Monitor temperature curve and white blood cell count     back to OR for debridement  6/10    Chronic respiratory failure  He is now doing well with nasal cannula  Trach capped  Still having some issues with secretions at times  Will hold off on pulling trach for now  Reassess on Monday  added scopolamine for secretions   Hopefully we will be able to pull trach in the next day or two    Pressure ulcer of sacral region, stage 4  - Continue with wound care    ESRD (end stage renal disease)  - Started on HD 12/30   - Continue with HD per nephrology  - Now on epogen      Type 2 diabetes mellitus without complication, without long-term current use of insulin  - accuchecks look good  - on levemir 15 units and sliding scale    Hypertension  Blood pressure looks okay   He is on cardizem and metoprolol (mainly for rate)        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

## 2022-06-21 NOTE — NURSING
Patient is awake  Continues to moan and facial grimaces noted .. morphine 4 mg and zofran 4mg as ordered per DR. CARDONA FOR DISCOMFORTS WILL MONITOR

## 2022-06-21 NOTE — PROGRESS NOTES
[facility]  Nephrology  Progress Note    Patient Name: Og Garcia  MRN: [id number]  Admission Date: 12/23/2021  Hospital Length of Stay: 180 days  Attending Provider: Cecil Abernathy DO   Primary Care Physician: Hector Arndt DNP, FNP-C  Principal Problem:Denice gangrene    Subjective:     HPI: The patient is known from the previous nephrology consult at [facility].  He is no at Specialty and continues to need dialysis support.      Interval History: The patient is resting.  No acute changes.  He has been hemodynamically stable.    Review of patient's allergies indicates:  No Known Allergies  Current Facility-Administered Medications   Medication Frequency    0.9%  NaCl infusion (for blood administration) Q24H PRN    0.9%  NaCl infusion PRN    acetaminophen tablet 500 mg Q6H PRN    acetylcysteine 200 mg/ml (20%) solution 2 mL TID WAKE    albuterol nebulizer solution 2.5 mg Q4H PRN    albuterol-ipratropium 2.5 mg-0.5 mg/3 mL nebulizer solution 3 mL Q6H    bisacodyL EC tablet 10 mg Daily PRN    collagenase ointment Daily PRN    dextrose 50% injection 12.5 g PRN    diltiaZEM tablet 90 mg Q6H    epoetin beth-epbx injection 10,000 Units Every Mon, Wed, Fri    glucagon (human recombinant) injection 1 mg PRN    guaiFENesin 100 mg/5 ml syrup 200 mg Q6H    heparin (porcine) injection 4,000 Units PRN    heparin (porcine) injection 5,000 Units Q8H    insulin aspart U-100 injection 1-10 Units Q6H    insulin detemir U-100 injection 15 Units QHS    linezolid tablet 600 mg Q12H    meropenem (MERREM) 500 mg in sodium chloride 0.9 % 100 mL IVPB (MB+) Q24H    metoprolol tartrate (LOPRESSOR) split tablet 12.5 mg BID    pantoprazole suspension 40 mg Daily    potassium iodide 1 gram/mL solution 5 drop QID    scopolamine 1.3-1.5 mg (1 mg over 3 days) 1 patch Q3 Days    sevelamer carbonate pwpk 0.8 g TID WM    simethicone chewable tablet 80 mg TID PRN    sodium chloride 0.9% bolus  250 mL PRN    tobramycin - pharmacy to dose pharmacy to manage frequency       Objective:     Vital Signs (Most Recent):  Temp: 98.2 °F (36.8 °C) (06/21/22 1100)  Pulse: 110 (06/21/22 1330)  Resp: (!) 35 (06/21/22 1330)  BP: (!) 100/50 (06/21/22 1330)  SpO2: 99 % (06/21/22 1330)  O2 Device (Oxygen Therapy): nasal cannula w/ humidification (06/21/22 1228)   Vital Signs (24h Range):  Temp:  [98.2 °F (36.8 °C)-100.6 °F (38.1 °C)] 98.2 °F (36.8 °C)  Pulse:  [106-127] 110  Resp:  [15-45] 35  SpO2:  [96 %-100 %] 99 %  BP: ()/(35-61) 100/50     Weight: 75.2 kg (165 lb 12.6 oz) (06/21/22 0500)  Body mass index is 23.12 kg/m².  Body surface area is 1.94 meters squared.    I/O last 3 completed shifts:  In: 1980 [P.O.:240; Other:120; NG/GT:1170; IV Piggyback:450]  Out: 2200 [Other:1800; Stool:400]    Physical Exam  Vitals reviewed.   HENT:      Head: Normocephalic.      Mouth/Throat:      Mouth: Mucous membranes are moist.   Cardiovascular:      Rate and Rhythm: Regular rhythm.      Pulses: Normal pulses.   Pulmonary:      Effort: Pulmonary effort is normal.   Abdominal:      Palpations: Abdomen is soft.   Neurological:      Mental Status: He is alert.       Significant Labs:  BMP:   Recent Labs   Lab 06/20/22  0315   GLU 88      K 4.5   CL 99   CO2 27   *   CREATININE 2.98*   CALCIUM 9.2     CBC:   Recent Labs   Lab 06/20/22  0315   WBC 14.04*   RBC 2.43*   HGB 7.5*   HCT 23.5*   *   MCV 96.7*   MCH 30.9   MCHC 31.9*        Significant Imaging:  Labs: Reviewed    Assessment/Plan:     Pressure ulcer of sacral region, stage 4  Per wound care    ESRD (end stage renal disease)    6/19/2022  Dialysis as scheduled.  6/20/2022 Dialysis today.  6/21/2022 Continue with dialysis as scheduled.        Thank you for your consult. I will follow-up with patient. Please contact us if you have any additional questions.    Edwin Pryor Jr, MD  Nephrology  [facility]

## 2022-06-21 NOTE — ASSESSMENT & PLAN NOTE
6/19/2022  Dialysis as scheduled.  6/20/2022 Dialysis today.  6/21/2022 Continue with dialysis as scheduled.

## 2022-06-21 NOTE — NURSING
Pt moaning . Facial grimacing noted .. comfort measures done .. pt continues to moan and have facial  grimacing .. elevated heartrate has been noted also .. secure chat sent to DR. CARDONA  To request something for discomfort for patient  Safety measures ongoing

## 2022-06-21 NOTE — NURSING
Message sent to DR. CARDONA ABOUT PT'S HR, B/P AND TEMP.. ORDERS WERE RECEIVED / NOTED .. WILL CONTINUE TO MONITOR AND REPORT TO 7 A SHIFT TO FOLLOW UP.. SHE WAS ALSO INFORMED THAT SCHEDULED DOSE OF CARDIZEM WAS HELD DUE TO B/P  MAP IS LESS THAN 65 ( ORDERED TO HOLD IF MAP IS LESS THAN 65 ) PT RESTING NO ACUTE DISTRESS NOTED

## 2022-06-22 NOTE — SUBJECTIVE & OBJECTIVE
Interval History:  awake, alert, no significant change. Trach capped     Objective:     Vital Signs (Most Recent):  Temp: 99.9 °F (37.7 °C) (06/22/22 1115)  Pulse: (!) 117 (06/22/22 1301)  Resp: (!) 39 (06/22/22 1301)  BP: (!) 86/45 (06/22/22 1115)  SpO2: 98 % (06/22/22 1301)   Vital Signs (24h Range):  Temp:  [98.7 °F (37.1 °C)-101.1 °F (38.4 °C)] 99.9 °F (37.7 °C)  Pulse:  [108-123] 117  Resp:  [22-99] 39  SpO2:  [93 %-100 %] 98 %  BP: ()/(38-60) 86/45     Weight: 76.2 kg (167 lb 15.9 oz)  Body mass index is 23.43 kg/m².      Intake/Output Summary (Last 24 hours) at 6/22/2022 1446  Last data filed at 6/22/2022 1115  Gross per 24 hour   Intake 1320 ml   Output 181 ml   Net 1139 ml       Physical Exam  Vitals reviewed.   Constitutional:       General: He is not in acute distress.     Appearance: He is not ill-appearing.      Comments: Chronically ill appearing   HENT:      Right Ear: External ear normal.      Left Ear: External ear normal.      Mouth/Throat:      Mouth: Mucous membranes are moist.   Eyes:      Conjunctiva/sclera: Conjunctivae normal.   Neck:      Trachea: Tracheostomy present.   Cardiovascular:      Rate and Rhythm: Normal rate and regular rhythm.   Pulmonary:      Effort: Pulmonary effort is normal.      Breath sounds: Normal breath sounds.   Abdominal:      General: Bowel sounds are normal.      Palpations: Abdomen is soft.   Musculoskeletal:         General: Normal range of motion.      Cervical back: Normal range of motion and neck supple.   Skin:     General: Skin is warm and dry.      Capillary Refill: Capillary refill takes less than 2 seconds.   Neurological:      Mental Status: He is alert. Mental status is at baseline.   Psychiatric:         Mood and Affect: Mood normal.       Vents:  Vent Mode: Standby (05/28/22 0415)  Ventilator Initiated: No (04/23/22 1934)  Set Rate: 0 BPM (05/18/22 0516)  Vt Set: 0 mL (05/18/22 0516)  Pressure Support: 15 cmH20 (05/22/22 0044)  PEEP/CPAP: 5  cmH20 (05/22/22 0044)  Oxygen Concentration (%): 28 (06/21/22 1228)  Peak Airway Pressure: 21 cmH2O (05/22/22 0044)  Plateau Pressure: 20 cmH20 (03/07/22 0500)  Total Ve: 7.8 mL (05/22/22 0044)  F/VT Ratio<105 (RSBI): (!) 22.64 (05/22/22 0044)    Lines/Drains/Airways       Central Venous Catheter Line  Duration                  Hemodialysis Catheter 12/30/21 0900 right internal jugular 174 days              Drain  Duration                  Colostomy 12/18/21 1030 Descending/sigmoid  days         Gastrostomy/Enterostomy 03/09/22 1436 Percutaneous endoscopic gastrostomy (PEG) midline feeding 104 days              Airway  Duration                  Surgical Airway 04/27/22 0856 Marychuy;Other (Comment) Cuffed;Long 56 days              Peripheral Intravenous Line  Duration                  Peripheral IV - Single Lumen 06/03/22 1527 18 G Anterior;Right Forearm 18 days                    Significant Labs:    CBC/Anemia Profile:  No results for input(s): WBC, HGB, HCT, PLT, MCV, RDW, IRON, FERRITIN, RETIC, FOLATE, [id number], OCCULTBLOOD in the last 48 hours.     Chemistries:  No results for input(s): NA, K, CL, CO2, BUN, CREATININE, CALCIUM, ALBUMIN, PROT, BILITOT, ALKPHOS, ALT, AST, GLUCOSE, MG, PHOS in the last 48 hours.    All pertinent labs within the past 24 hours have been reviewed.    Significant Imaging:  I have reviewed all pertinent imaging results/findings within the past 24 hours.

## 2022-06-22 NOTE — ASSESSMENT & PLAN NOTE
6/19/2022  Dialysis as scheduled.  6/20/2022 Dialysis today.  6/21/2022 Continue with dialysis as scheduled.  6/22/2022  Dialysis sessions are going well.

## 2022-06-22 NOTE — PROGRESS NOTES
[facility]  Nephrology  Progress Note    Patient Name: Og Garcia  MRN: [id number]  Admission Date: 12/23/2021  Hospital Length of Stay: 181 days  Attending Provider: Cecil Abernathy DO   Primary Care Physician: Hector Arndt DNP, FNP-C  Principal Problem:Denice gangrene    Subjective:     HPI: The patient is known from the previous nephrology consult at [facility].  He is no at Specialty and continues to need dialysis support.      Interval History: The patient is sitting up in bed.  He is resting and is in no acute distress.    Review of patient's allergies indicates:  No Known Allergies  Current Facility-Administered Medications   Medication Frequency    0.9%  NaCl infusion (for blood administration) Q24H PRN    0.9%  NaCl infusion PRN    acetaminophen tablet 500 mg Q6H PRN    acetylcysteine 200 mg/ml (20%) solution 2 mL TID WAKE    albuterol nebulizer solution 2.5 mg Q4H PRN    albuterol-ipratropium 2.5 mg-0.5 mg/3 mL nebulizer solution 3 mL Q6H    bisacodyL EC tablet 10 mg Daily PRN    collagenase ointment Daily PRN    dextrose 50% injection 12.5 g PRN    diltiaZEM tablet 90 mg Q6H    epoetin beth-epbx injection 10,000 Units Every Mon, Wed, Fri    glucagon (human recombinant) injection 1 mg PRN    guaiFENesin 100 mg/5 ml syrup 200 mg Q6H    heparin (porcine) injection 4,000 Units PRN    heparin (porcine) injection 5,000 Units Q8H    insulin aspart U-100 injection 1-10 Units Q6H    insulin detemir U-100 injection 15 Units QHS    linezolid tablet 600 mg Q12H    meropenem (MERREM) 500 mg in sodium chloride 0.9 % 100 mL IVPB (MB+) Q24H    metoprolol tartrate (LOPRESSOR) split tablet 12.5 mg BID    pantoprazole suspension 40 mg Daily    potassium iodide 1 gram/mL solution 5 drop QID    scopolamine 1.3-1.5 mg (1 mg over 3 days) 1 patch Q3 Days    sevelamer carbonate pwpk 0.8 g TID WM    simethicone chewable tablet 80 mg TID PRN    sodium chloride 0.9% bolus  250 mL PRN    tobramycin - pharmacy to dose pharmacy to manage frequency       Objective:     Vital Signs (Most Recent):  Temp: 99.9 °F (37.7 °C) (06/22/22 1115)  Pulse: (!) 114 (06/22/22 1800)  Resp: (!) 41 (06/22/22 1800)  BP: (!) 92/41 (06/22/22 1800)  SpO2: 95 % (06/22/22 1800)  O2 Device (Oxygen Therapy): room air (06/22/22 1254)   Vital Signs (24h Range):  Temp:  [99.9 °F (37.7 °C)-101.1 °F (38.4 °C)] 99.9 °F (37.7 °C)  Pulse:  [108-123] 114  Resp:  [22-99] 41  SpO2:  [93 %-100 %] 95 %  BP: ()/(37-58) 92/41     Weight: 76.2 kg (167 lb 15.9 oz) (06/22/22 0600)  Body mass index is 23.43 kg/m².  Body surface area is 1.95 meters squared.    I/O last 3 completed shifts:  In: 2410 [P.O.:240; Other:300; NG/GT:1770; IV Piggyback:100]  Out: 200 [Stool:200]    Physical Exam  Vitals reviewed.   HENT:      Head: Normocephalic.   Cardiovascular:      Rate and Rhythm: Regular rhythm.   Pulmonary:      Effort: Pulmonary effort is normal.   Abdominal:      Palpations: Abdomen is soft.   Neurological:      Mental Status: He is alert.       Significant Labs:  BMP:   Recent Labs   Lab 06/20/22 0315   GLU 88      K 4.5   CL 99   CO2 27   *   CREATININE 2.98*   CALCIUM 9.2     CBC:   Recent Labs   Lab 06/20/22 0315   WBC 14.04*   RBC 2.43*   HGB 7.5*   HCT 23.5*   *   MCV 96.7*   MCH 30.9   MCHC 31.9*        Significant Imaging:  Labs: Reviewed    Assessment/Plan:     Pressure ulcer of sacral region, stage 4  Per wound care  Cared for by wound care    ESRD (end stage renal disease)    6/19/2022  Dialysis as scheduled.  6/20/2022 Dialysis today.  6/21/2022 Continue with dialysis as scheduled.  6/22/2022  Dialysis sessions are going well.        Thank you for your consult. I will follow-up with patient. Please contact us if you have any additional questions.    Edwin Pryor Jr, MD  Nephrology  [facility]

## 2022-06-22 NOTE — PROGRESS NOTES
[facility]  Pulmonology  Progress Note    Patient Name: Og Garcia  MRN: [id number]  Admission Date: 12/23/2021  Hospital Length of Stay: 181 days  Code Status: Prior  Attending Provider: Cecil Abernathy DO  Primary Care Provider: Hector Arndt DNP, FNP-C   Principal Problem: Denice gangrene    Subjective:     Interval History:  awake, alert, no significant change. Trach capped     Objective:     Vital Signs (Most Recent):  Temp: 99.9 °F (37.7 °C) (06/22/22 1115)  Pulse: (!) 117 (06/22/22 1301)  Resp: (!) 39 (06/22/22 1301)  BP: (!) 86/45 (06/22/22 1115)  SpO2: 98 % (06/22/22 1301)   Vital Signs (24h Range):  Temp:  [98.7 °F (37.1 °C)-101.1 °F (38.4 °C)] 99.9 °F (37.7 °C)  Pulse:  [108-123] 117  Resp:  [22-99] 39  SpO2:  [93 %-100 %] 98 %  BP: ()/(38-60) 86/45     Weight: 76.2 kg (167 lb 15.9 oz)  Body mass index is 23.43 kg/m².      Intake/Output Summary (Last 24 hours) at 6/22/2022 1446  Last data filed at 6/22/2022 1115  Gross per 24 hour   Intake 1320 ml   Output 181 ml   Net 1139 ml       Physical Exam  Vitals reviewed.   Constitutional:       General: He is not in acute distress.     Appearance: He is not ill-appearing.      Comments: Chronically ill appearing   HENT:      Right Ear: External ear normal.      Left Ear: External ear normal.      Mouth/Throat:      Mouth: Mucous membranes are moist.   Eyes:      Conjunctiva/sclera: Conjunctivae normal.   Neck:      Trachea: Tracheostomy present.   Cardiovascular:      Rate and Rhythm: Normal rate and regular rhythm.   Pulmonary:      Effort: Pulmonary effort is normal.      Breath sounds: Normal breath sounds.   Abdominal:      General: Bowel sounds are normal.      Palpations: Abdomen is soft.   Musculoskeletal:         General: Normal range of motion.      Cervical back: Normal range of motion and neck supple.   Skin:     General: Skin is warm and dry.      Capillary Refill: Capillary refill takes less than 2  seconds.   Neurological:      Mental Status: He is alert. Mental status is at baseline.   Psychiatric:         Mood and Affect: Mood normal.       Vents:  Vent Mode: Standby (05/28/22 0415)  Ventilator Initiated: No (04/23/22 1934)  Set Rate: 0 BPM (05/18/22 0516)  Vt Set: 0 mL (05/18/22 0516)  Pressure Support: 15 cmH20 (05/22/22 0044)  PEEP/CPAP: 5 cmH20 (05/22/22 0044)  Oxygen Concentration (%): 28 (06/21/22 1228)  Peak Airway Pressure: 21 cmH2O (05/22/22 0044)  Plateau Pressure: 20 cmH20 (03/07/22 0500)  Total Ve: 7.8 mL (05/22/22 0044)  F/VT Ratio<105 (RSBI): (!) 22.64 (05/22/22 0044)    Lines/Drains/Airways       Central Venous Catheter Line  Duration                  Hemodialysis Catheter 12/30/21 0900 right internal jugular 174 days              Drain  Duration                  Colostomy 12/18/21 1030 Descending/sigmoid  days         Gastrostomy/Enterostomy 03/09/22 1436 Percutaneous endoscopic gastrostomy (PEG) midline feeding 104 days              Airway  Duration                  Surgical Airway 04/27/22 0856 Shiley;Other (Comment) Cuffed;Long 56 days              Peripheral Intravenous Line  Duration                  Peripheral IV - Single Lumen 06/03/22 1527 18 G Anterior;Right Forearm 18 days                    Significant Labs:    CBC/Anemia Profile:  No results for input(s): WBC, HGB, HCT, PLT, MCV, RDW, IRON, FERRITIN, RETIC, FOLATE, [id number], OCCULTBLOOD in the last 48 hours.     Chemistries:  No results for input(s): NA, K, CL, CO2, BUN, CREATININE, CALCIUM, ALBUMIN, PROT, BILITOT, ALKPHOS, ALT, AST, GLUCOSE, MG, PHOS in the last 48 hours.    All pertinent labs within the past 24 hours have been reviewed.    Significant Imaging:  I have reviewed all pertinent imaging results/findings within the past 24 hours.    Assessment/Plan:     * Denice gangrene  - Stable   - wound vac     Osteomyelitis  - Continue IV antibiotics per ID recommendations - stop date 07/01   - He has been treated for  multidrug resistant bacteria     ID note reviewed and appreciated:  1. Continue tobramycin and meropenem, both renally dosed, until 7/1  2. 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

## 2022-06-22 NOTE — DIALYSIS ROUNDING
Seen during hemodialysis. Stable at present and awake. Continuing support with maintenance dialysis.

## 2022-06-22 NOTE — SUBJECTIVE & OBJECTIVE
Interval History: The patient is sitting up in bed.  He is resting and is in no acute distress.    Review of patient's allergies indicates:  No Known Allergies  Current Facility-Administered Medications   Medication Frequency    0.9%  NaCl infusion (for blood administration) Q24H PRN    0.9%  NaCl infusion PRN    acetaminophen tablet 500 mg Q6H PRN    acetylcysteine 200 mg/ml (20%) solution 2 mL TID WAKE    albuterol nebulizer solution 2.5 mg Q4H PRN    albuterol-ipratropium 2.5 mg-0.5 mg/3 mL nebulizer solution 3 mL Q6H    bisacodyL EC tablet 10 mg Daily PRN    collagenase ointment Daily PRN    dextrose 50% injection 12.5 g PRN    diltiaZEM tablet 90 mg Q6H    epoetin beth-epbx injection 10,000 Units Every Mon, Wed, Fri    glucagon (human recombinant) injection 1 mg PRN    guaiFENesin 100 mg/5 ml syrup 200 mg Q6H    heparin (porcine) injection 4,000 Units PRN    heparin (porcine) injection 5,000 Units Q8H    insulin aspart U-100 injection 1-10 Units Q6H    insulin detemir U-100 injection 15 Units QHS    linezolid tablet 600 mg Q12H    meropenem (MERREM) 500 mg in sodium chloride 0.9 % 100 mL IVPB (MB+) Q24H    metoprolol tartrate (LOPRESSOR) split tablet 12.5 mg BID    pantoprazole suspension 40 mg Daily    potassium iodide 1 gram/mL solution 5 drop QID    scopolamine 1.3-1.5 mg (1 mg over 3 days) 1 patch Q3 Days    sevelamer carbonate pwpk 0.8 g TID WM    simethicone chewable tablet 80 mg TID PRN    sodium chloride 0.9% bolus 250 mL PRN    tobramycin - pharmacy to dose pharmacy to manage frequency       Objective:     Vital Signs (Most Recent):  Temp: 99.9 °F (37.7 °C) (06/22/22 1115)  Pulse: (!) 114 (06/22/22 1800)  Resp: (!) 41 (06/22/22 1800)  BP: (!) 92/41 (06/22/22 1800)  SpO2: 95 % (06/22/22 1800)  O2 Device (Oxygen Therapy): room air (06/22/22 1254)   Vital Signs (24h Range):  Temp:  [99.9 °F (37.7 °C)-101.1 °F (38.4 °C)] 99.9 °F (37.7 °C)  Pulse:  [108-123] 114  Resp:  [22-99] 41  SpO2:  [93 %-100 %] 95  %  BP: ()/(37-58) 92/41     Weight: 76.2 kg (167 lb 15.9 oz) (06/22/22 0600)  Body mass index is 23.43 kg/m².  Body surface area is 1.95 meters squared.    I/O last 3 completed shifts:  In: 2410 [P.O.:240; Other:300; NG/GT:1770; IV Piggyback:100]  Out: 200 [Stool:200]    Physical Exam  Vitals reviewed.   HENT:      Head: Normocephalic.   Cardiovascular:      Rate and Rhythm: Regular rhythm.   Pulmonary:      Effort: Pulmonary effort is normal.   Abdominal:      Palpations: Abdomen is soft.   Neurological:      Mental Status: He is alert.       Significant Labs:  BMP:   Recent Labs   Lab 06/20/22  0315   GLU 88      K 4.5   CL 99   CO2 27   *   CREATININE 2.98*   CALCIUM 9.2     CBC:   Recent Labs   Lab 06/20/22  0315   WBC 14.04*   RBC 2.43*   HGB 7.5*   HCT 23.5*   *   MCV 96.7*   MCH 30.9   MCHC 31.9*        Significant Imaging:  Labs: Reviewed

## 2022-06-23 NOTE — PLAN OF CARE
Problem: Glycemic Control Impaired (Sepsis/Septic Shock)  Goal: Blood Glucose Level Within Desired Range  Outcome: Ongoing, Progressing  Intervention: Optimize Glycemic Control  Flowsheets (Taken 6/23/2022 1723)  Glycemic Management:   blood glucose monitored   supplemental insulin given

## 2022-06-23 NOTE — ASSESSMENT & PLAN NOTE
Continue current treatment  Chronic condition  Glucoses are acceptable.  6/13/2022 Condition is stable.  6/17/2022  Stable.  6/19/2022  Continue with current treatment  6/23/2022 Condition is stable.

## 2022-06-23 NOTE — ASSESSMENT & PLAN NOTE
6/19/2022  Dialysis as scheduled.  6/20/2022 Dialysis today.  6/21/2022 Continue with dialysis as scheduled.  6/22/2022  Dialysis sessions are going well.  6/23/2022  Dialysis on Friday.

## 2022-06-23 NOTE — ASSESSMENT & PLAN NOTE
Had a positive bone culture  ID note reviewed from 5/22-- continue IV antibiotics until 07/01/22  Still occasionally running fever despite antibiotics

## 2022-06-23 NOTE — ASSESSMENT & PLAN NOTE
He is now doing well with nasal cannula  Trach capped  Still having some issues with secretions at times  Will hold off on pulling trach for now  Reassess on Monday  added scopolamine for secretions   Hopefully we will be able to pull trach in the next day or two  6/23- plan to pull trach tomorrow

## 2022-06-23 NOTE — PROGRESS NOTES
Follow up: Pt continues on Nepro @ 60 mL/hr + Matthew BID + FWF @ 50 mL Q4 hrs which is providing 2770 kcals (100% needs), 122 g PRO (100% needs), 1269 mL fluid. Matthew BID should continue to aid in wound healing. Recommend continuing current regimen as tolerated. Note, pt current weight is 159 lbs. Pt appears to have lost 6 lbs x2 days, RD suspects weight loss is most likely fluid related. Pt continues on dialysis, last HD on 6/22 and pt doing well per notes.     Most recent Labs (6/20):    Latest Reference Range & Units 06/20/22 03:15   WBC 4.50 - 11.00 K/uL 14.04 (H)   RBC 4.60 - 6.20 M/uL 2.43 (L)   Hemoglobin 13.5 - 18.0 g/dL 7.5 (L)   Hematocrit 40.0 - 54.0 % 23.5 (L)   MCV 80.0 - 96.0 fL 96.7 (H)   MCH 27.0 - 31.0 pg 30.9   MCHC 32.0 - 36.0 g/dL 31.9 (L)   RDW 11.5 - 14.5 % 15.4 (H)   Platelets 150 - 400 K/uL 448 (H)   MPV 9.4 - 12.4 fL 8.8 (L)   Neutrophils Relative 53.0 - 65.0 % 54.5   Lymph % 27 - 41 %  27.0 - 41.0 % 23 (L)  21.2 (L)   Mono % 2 - 6 %  2.0 - 6.0 % 14 (H)  15.2 (H)   Eosinophil % 1 - 4 %  1.0 - 4.0 % 2  5.4 (H)   Basophil % 0.0 - 1.0 % 0.4   Immature Granulocytes 0.0 - 0.4 % 3.3 (H)   Neutrophils, Abs 1.80 - 7.70 K/uL 7.67   Lymph # 1.00 - 4.80 K/uL 2.97   Mono # 0.00 - 0.80 K/uL 2.13 (H)   Eos # 0.00 - 0.50 K/uL 0.76 (H)   Baso # 0.00 - 0.20 K/uL 0.05   Immature Grans (Abs) 0.00 - 0.04 K/uL 0.46 (H)   Segmented Neutrophils, Man % 50 - 62 % 58   Bands 1 - 5 % 1   Metamyelocytes % 2   nRBC <=0.0 % 0.0   NUCLEATED RBC ABSOLUTE <=0.00 x10e3/uL 0.00   Differential Type  Manual   Poly  Few   Hypo  Few   PLATELET MORPHOLOGY Normal  Increased !   (H): Data is abnormally high  (L): Data is abnormally low  !: Data is abnormal     Latest Reference Range & Units 06/20/22 03:15   Sodium 136 - 145 mmol/L 138   Potassium 3.5 - 5.1 mmol/L 4.5   Chloride 98 - 107 mmol/L 99   CO2 21 - 32 mmol/L 27   Anion Gap 7 - 16 mmol/L 17 (H)   BUN 7 - 18 mg/dL 115 (H)   Creatinine 0.70 - 1.30 mg/dL 2.98 (H)   BUN/CREAT  RATIO 6 - 20  39 (H)   eGFR if non African American >=60 mL/min/1.73m² 22 (L)   Glucose 74 - 106 mg/dL 88   Calcium 8.5 - 10.1 mg/dL 9.2   (H): Data is abnormally high  (L): Data is abnormally low    Note: Hgb, Hct, RBC decreased while MCV is elevated. Labs indicate possible Macrocytic Anemia, if able and desired, recommend folate + B12 supplementation. Otherwise, continue current POC. RD to continue to  monitor TF intake/tolerance, weight changes, labs/meds, wound healing, POC.

## 2022-06-23 NOTE — PROGRESS NOTES
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 male with abrasion on penis and testicles. He is grimacing today during exam and abdomen is tender to palpation. He nods when asked if his abdomen hurts, CT today to rule out acute etiology of pain. 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 Pertinent medical history includes diabetes, hypertension, anemia, chronic respiratory failure, and infection. Factors that complicate wound healing include ESRD, chronic anemia,chronic respiratory failure, multiple co-morbidities, poor vascular supply, diabetes, decreased granulation tissue, necrosis and infection.           Review of Systems   Unable to perform ROS: Acuity of condition   Gastrointestinal: Positive for abdominal pain.     Objective:     Vital Signs (Most Recent):  Temp: 99.2 °F (37.3 °C) (06/23/22 0400)  Pulse: 110 (06/23/22 0730)  Resp: (!) 39 (06/23/22 0730)  BP: (!) 78/47 (06/23/22 0500)  SpO2: (!) 94 % (06/23/22 0730) Vital Signs (24h Range):  Temp:  [98.2 °F (36.8 °C)-100.6 °F (38.1 °C)] 99.2 °F (37.3 °C)  Pulse:  [110-119] 110  Resp:  [32-44] 39  SpO2:  [90 %-100 %] 94 %  BP: ()/(37-53) 78/47     Weight: 72.3 kg (159 lb 6.3 oz)  Body mass index is 22.23 kg/m².  No data recorded    Physical Exam  Vitals reviewed.   Constitutional:       Appearance: He is ill-appearing.      Comments: Chronically ill   HENT:      Head: Normocephalic.   Cardiovascular:      Rate and Rhythm: Tachycardia present.   Abdominal:      Tenderness:  There is abdominal tenderness.      Hernia: A hernia is present.      Comments: protuberant    Skin:     Findings: Erythema present.      Comments: Multiple wounds   Neurological:      Mental Status: Mental status is at baseline.      Sensory: Sensory deficit present.      Motor: Weakness present.       Assessment and Plan      CT today to r/o acute etiology of abdominal pain, will have Dr. Cazares re-evaluate patient.   Abrasions to penis/testicles:  Clean with soap and water  Apply calmaseptine twice daily and PRN  Assess every shift to ensure wounds are healing well.       Signature:  HERSON Viveros  Wound Care    Date of encounter: 06/23/2022

## 2022-06-23 NOTE — SUBJECTIVE & OBJECTIVE
Interval History: No acute events overnight. He is currently resting comfortably. Oxygenating adequately on 2L nasal cannula. Tmax of 101 in last 24 hours.    Objective:     Vital Signs (Most Recent):  Temp: (!) 101.4 °F (38.6 °C) (06/23/22 0701)  Pulse: (!) 114 (06/23/22 1000)  Resp: (!) 25 (06/23/22 0900)  BP: (!) 83/42 (06/23/22 0900)  SpO2: (!) 94 % (06/23/22 1000)   Vital Signs (24h Range):  Temp:  [98.2 °F (36.8 °C)-101.4 °F (38.6 °C)] 101.4 °F (38.6 °C)  Pulse:  [] 114  Resp:  [25-43] 25  SpO2:  [90 %-100 %] 94 %  BP: ()/(41-53) 83/42     Weight: 72.3 kg (159 lb 6.3 oz)  Body mass index is 22.23 kg/m².      Intake/Output Summary (Last 24 hours) at 6/23/2022 1201  Last data filed at 6/23/2022 0600  Gross per 24 hour   Intake 910 ml   Output 100 ml   Net 810 ml         Physical Exam  Vitals reviewed.   Constitutional:       General: He is not in acute distress.     Appearance: He is not ill-appearing.      Comments: Chronically ill appearing   HENT:      Right Ear: External ear normal.      Left Ear: External ear normal.      Mouth/Throat:      Mouth: Mucous membranes are moist.   Eyes:      Conjunctiva/sclera: Conjunctivae normal.   Neck:      Trachea: Tracheostomy present.   Cardiovascular:      Rate and Rhythm: Normal rate and regular rhythm.   Pulmonary:      Effort: Pulmonary effort is normal.      Breath sounds: Normal breath sounds.   Abdominal:      General: Bowel sounds are normal.      Palpations: Abdomen is soft.   Musculoskeletal:         General: Normal range of motion.      Cervical back: Normal range of motion and neck supple.   Skin:     General: Skin is warm and dry.      Capillary Refill: Capillary refill takes less than 2 seconds.   Neurological:      Mental Status: He is alert. Mental status is at baseline.   Psychiatric:         Mood and Affect: Mood normal.       Vents:  Vent Mode: Standby (05/28/22 7588)  Ventilator Initiated: No (04/23/22 1934)  Set Rate: 0 BPM (05/18/22  0516)  Vt Set: 0 mL (05/18/22 0516)  Pressure Support: 15 cmH20 (05/22/22 0044)  PEEP/CPAP: 5 cmH20 (05/22/22 0044)  Oxygen Concentration (%): 28 (06/21/22 1228)  Peak Airway Pressure: 21 cmH2O (05/22/22 0044)  Plateau Pressure: 20 cmH20 (03/07/22 0500)  Total Ve: 7.8 mL (05/22/22 0044)  F/VT Ratio<105 (RSBI): (!) 22.64 (05/22/22 0044)    Lines/Drains/Airways       Central Venous Catheter Line  Duration                  Hemodialysis Catheter 12/30/21 0900 right internal jugular 175 days              Drain  Duration                  Colostomy 12/18/21 1030 Descending/sigmoid  days         Gastrostomy/Enterostomy 03/09/22 1436 Percutaneous endoscopic gastrostomy (PEG) midline feeding 105 days              Airway  Duration                  Surgical Airway 04/27/22 0856 Shiley;Other (Comment) Cuffed;Long 57 days              Peripheral Intravenous Line  Duration                  Peripheral IV - Single Lumen 06/03/22 1527 18 G Anterior;Right Forearm 19 days                    Significant Labs:    CBC/Anemia Profile:  No results for input(s): WBC, HGB, HCT, PLT, MCV, RDW, IRON, FERRITIN, RETIC, FOLATE, [id number], OCCULTBLOOD in the last 48 hours.       Chemistries:  No results for input(s): NA, K, CL, CO2, BUN, CREATININE, CALCIUM, ALBUMIN, PROT, BILITOT, ALKPHOS, ALT, AST, GLUCOSE, MG, PHOS in the last 48 hours.      All pertinent labs within the past 24 hours have been reviewed.    Significant Imaging:  I have reviewed all pertinent imaging results/findings within the past 24 hours.

## 2022-06-23 NOTE — SUBJECTIVE & OBJECTIVE
Interval History: The patient is doing about the same.  No acute changes.    Review of patient's allergies indicates:  No Known Allergies  Current Facility-Administered Medications   Medication Frequency    0.9%  NaCl infusion (for blood administration) Q24H PRN    0.9%  NaCl infusion PRN    acetaminophen tablet 500 mg Q6H PRN    acetic acid 0.25 % irrigation Daily PRN    acetylcysteine 200 mg/ml (20%) solution 2 mL TID WAKE    albuterol nebulizer solution 2.5 mg Q4H PRN    albuterol-ipratropium 2.5 mg-0.5 mg/3 mL nebulizer solution 3 mL Q6H    bisacodyL EC tablet 10 mg Daily PRN    collagenase ointment Daily PRN    dextrose 50% injection 12.5 g PRN    diltiaZEM tablet 90 mg Q6H    epoetin beth-epbx injection 10,000 Units Every Mon, Wed, Fri    glucagon (human recombinant) injection 1 mg PRN    guaiFENesin 100 mg/5 ml syrup 200 mg Q6H    heparin (porcine) injection 4,000 Units PRN    heparin (porcine) injection 5,000 Units Q8H    insulin aspart U-100 injection 1-10 Units Q6H    insulin detemir U-100 injection 15 Units QHS    linezolid tablet 600 mg Q12H    meropenem (MERREM) 500 mg in sodium chloride 0.9 % 100 mL IVPB (MB+) Q24H    metoprolol tartrate (LOPRESSOR) split tablet 12.5 mg BID    pantoprazole suspension 40 mg Daily    potassium iodide 1 gram/mL solution 5 drop QID    scopolamine 1.3-1.5 mg (1 mg over 3 days) 1 patch Q3 Days    sevelamer carbonate pwpk 0.8 g TID WM    simethicone chewable tablet 80 mg TID PRN    sodium chloride 0.9% bolus 250 mL PRN    tobramycin - pharmacy to dose pharmacy to manage frequency       Objective:     Vital Signs (Most Recent):  Temp: 99.2 °F (37.3 °C) (06/23/22 1500)  Pulse: 104 (06/23/22 1625)  Resp: (!) 38 (06/23/22 1625)  BP: (!) 107/41 (06/23/22 1741)  SpO2: 100 % (06/23/22 1625)  O2 Device (Oxygen Therapy): nasal cannula (06/23/22 1244)   Vital Signs (24h Range):  Temp:  [98.2 °F (36.8 °C)-101.4 °F (38.6 °C)] 99.2 °F (37.3 °C)  Pulse:  [] 104  Resp:  [19-48]  38  SpO2:  [81 %-100 %] 100 %  BP: ()/(39-57) 107/41     Weight: 72.3 kg (159 lb 6.3 oz) (06/23/22 0900)  Body mass index is 22.23 kg/m².  Body surface area is 1.9 meters squared.    I/O last 3 completed shifts:  In: 1150 [P.O.:240; NG/GT:810; IV Piggyback:100]  Out: 181 [Other:81; Stool:100]    Physical Exam  Vitals reviewed.   HENT:      Head: Normocephalic.      Mouth/Throat:      Mouth: Mucous membranes are moist.   Cardiovascular:      Rate and Rhythm: Regular rhythm.      Pulses: Normal pulses.   Pulmonary:      Effort: Pulmonary effort is normal.   Abdominal:      Palpations: Abdomen is soft.   Neurological:      Mental Status: He is alert.       Significant Labs:  BMP:   Recent Labs   Lab 06/20/22  0315   GLU 88      K 4.5   CL 99   CO2 27   *   CREATININE 2.98*   CALCIUM 9.2     CBC:   Recent Labs   Lab 06/20/22  0315   WBC 14.04*   RBC 2.43*   HGB 7.5*   HCT 23.5*   *   MCV 96.7*   MCH 30.9   MCHC 31.9*        Significant Imaging:  Labs: Reviewed

## 2022-06-23 NOTE — PROGRESS NOTES
[facility]  Pulmonology  Progress Note    Patient Name: Og Garcia  MRN: [id number]  Admission Date: 12/23/2021  Hospital Length of Stay: 182 days  Code Status: Prior  Attending Provider: Cecil Abernathy DO  Primary Care Provider: Hector Arndt DNP, FNP-C   Principal Problem: Denice gangrene    Subjective:     Interval History: No acute events overnight. He is currently resting comfortably. Oxygenating adequately on 2L nasal cannula. Tmax of 101 in last 24 hours.    Objective:     Vital Signs (Most Recent):  Temp: (!) 101.4 °F (38.6 °C) (06/23/22 0701)  Pulse: (!) 114 (06/23/22 1000)  Resp: (!) 25 (06/23/22 0900)  BP: (!) 83/42 (06/23/22 0900)  SpO2: (!) 94 % (06/23/22 1000)   Vital Signs (24h Range):  Temp:  [98.2 °F (36.8 °C)-101.4 °F (38.6 °C)] 101.4 °F (38.6 °C)  Pulse:  [] 114  Resp:  [25-43] 25  SpO2:  [90 %-100 %] 94 %  BP: ()/(41-53) 83/42     Weight: 72.3 kg (159 lb 6.3 oz)  Body mass index is 22.23 kg/m².      Intake/Output Summary (Last 24 hours) at 6/23/2022 1201  Last data filed at 6/23/2022 0600  Gross per 24 hour   Intake 910 ml   Output 100 ml   Net 810 ml         Physical Exam  Vitals reviewed.   Constitutional:       General: He is not in acute distress.     Appearance: He is not ill-appearing.      Comments: Chronically ill appearing   HENT:      Right Ear: External ear normal.      Left Ear: External ear normal.      Mouth/Throat:      Mouth: Mucous membranes are moist.   Eyes:      Conjunctiva/sclera: Conjunctivae normal.   Neck:      Trachea: Tracheostomy present.   Cardiovascular:      Rate and Rhythm: Normal rate and regular rhythm.   Pulmonary:      Effort: Pulmonary effort is normal.      Breath sounds: Normal breath sounds.   Abdominal:      General: Bowel sounds are normal.      Palpations: Abdomen is soft.   Musculoskeletal:         General: Normal range of motion.      Cervical back: Normal range of motion and neck supple.   Skin:      General: Skin is warm and dry.      Capillary Refill: Capillary refill takes less than 2 seconds.   Neurological:      Mental Status: He is alert. Mental status is at baseline.   Psychiatric:         Mood and Affect: Mood normal.       Vents:  Vent Mode: Standby (05/28/22 0415)  Ventilator Initiated: No (04/23/22 1934)  Set Rate: 0 BPM (05/18/22 0516)  Vt Set: 0 mL (05/18/22 0516)  Pressure Support: 15 cmH20 (05/22/22 0044)  PEEP/CPAP: 5 cmH20 (05/22/22 0044)  Oxygen Concentration (%): 28 (06/21/22 1228)  Peak Airway Pressure: 21 cmH2O (05/22/22 0044)  Plateau Pressure: 20 cmH20 (03/07/22 0500)  Total Ve: 7.8 mL (05/22/22 0044)  F/VT Ratio<105 (RSBI): (!) 22.64 (05/22/22 0044)    Lines/Drains/Airways       Central Venous Catheter Line  Duration                  Hemodialysis Catheter 12/30/21 0900 right internal jugular 175 days              Drain  Duration                  Colostomy 12/18/21 1030 Descending/sigmoid  days         Gastrostomy/Enterostomy 03/09/22 1436 Percutaneous endoscopic gastrostomy (PEG) midline feeding 105 days              Airway  Duration                  Surgical Airway 04/27/22 0856 Marychuy;Other (Comment) Cuffed;Long 57 days              Peripheral Intravenous Line  Duration                  Peripheral IV - Single Lumen 06/03/22 1527 18 G Anterior;Right Forearm 19 days                    Significant Labs:    CBC/Anemia Profile:  No results for input(s): WBC, HGB, HCT, PLT, MCV, RDW, IRON, FERRITIN, RETIC, FOLATE, [id number], OCCULTBLOOD in the last 48 hours.       Chemistries:  No results for input(s): NA, K, CL, CO2, BUN, CREATININE, CALCIUM, ALBUMIN, PROT, BILITOT, ALKPHOS, ALT, AST, GLUCOSE, MG, PHOS in the last 48 hours.      All pertinent labs within the past 24 hours have been reviewed.    Significant Imaging:  I have reviewed all pertinent imaging results/findings within the past 24 hours.    Assessment/Plan:     * Denice gangrene  - Stable   - wound vac     Osteomyelitis  -  Continue IV antibiotics per ID recommendations - stop date 07/01   - He has been treated for multidrug resistant bacteria     ID note reviewed and appreciated:  1. Continue tobramycin and meropenem, both renally dosed, until 7/1  2. 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 Monitor temperature curve and white blood cell count     back to OR for debridement  6/10    Chronic respiratory failure  He is now doing well with nasal cannula  Trach capped  Still having some issues with secretions at times  Will hold off on pulling trach for now  Reassess on Monday  added scopolamine for secretions   Hopefully we will be able to pull trach in the next day or two  6/23- plan to pull trach tomorrow      Pressure ulcer of sacral region, stage 4  - Continue with wound care    Fever  Had a positive bone culture  ID note reviewed from 5/22-- continue IV antibiotics until 07/01/22  Still occasionally running fever despite antibiotics    ESRD (end stage renal disease)  - Started on HD 12/30   - Continue with HD per nephrology  - Now on epogen      Type 2 diabetes mellitus without complication, without long-term current use of insulin  - accuchecks look good  - on levemir 15 units and sliding scale    Hypertension  Blood pressure on lower side this am  He is on cardizem and metoprolol (mainly for rate)        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

## 2022-06-23 NOTE — PROGRESS NOTES
[facility]  Nephrology  Progress Note    Patient Name: Og Garcia  MRN: [id number]  Admission Date: 12/23/2021  Hospital Length of Stay: 182 days  Attending Provider: Cecil Abernathy DO   Primary Care Physician: Hector Arndt DNP, FNP-C  Principal Problem:Denice gangrene    Subjective:     HPI: The patient is known from the previous nephrology consult at [facility].  He is no at Specialty and continues to need dialysis support.      Interval History: The patient is doing about the same.  No acute changes.    Review of patient's allergies indicates:  No Known Allergies  Current Facility-Administered Medications   Medication Frequency    0.9%  NaCl infusion (for blood administration) Q24H PRN    0.9%  NaCl infusion PRN    acetaminophen tablet 500 mg Q6H PRN    acetic acid 0.25 % irrigation Daily PRN    acetylcysteine 200 mg/ml (20%) solution 2 mL TID WAKE    albuterol nebulizer solution 2.5 mg Q4H PRN    albuterol-ipratropium 2.5 mg-0.5 mg/3 mL nebulizer solution 3 mL Q6H    bisacodyL EC tablet 10 mg Daily PRN    collagenase ointment Daily PRN    dextrose 50% injection 12.5 g PRN    diltiaZEM tablet 90 mg Q6H    epoetin beth-epbx injection 10,000 Units Every Mon, Wed, Fri    glucagon (human recombinant) injection 1 mg PRN    guaiFENesin 100 mg/5 ml syrup 200 mg Q6H    heparin (porcine) injection 4,000 Units PRN    heparin (porcine) injection 5,000 Units Q8H    insulin aspart U-100 injection 1-10 Units Q6H    insulin detemir U-100 injection 15 Units QHS    linezolid tablet 600 mg Q12H    meropenem (MERREM) 500 mg in sodium chloride 0.9 % 100 mL IVPB (MB+) Q24H    metoprolol tartrate (LOPRESSOR) split tablet 12.5 mg BID    pantoprazole suspension 40 mg Daily    potassium iodide 1 gram/mL solution 5 drop QID    scopolamine 1.3-1.5 mg (1 mg over 3 days) 1 patch Q3 Days    sevelamer carbonate pwpk 0.8 g TID WM    simethicone chewable tablet 80 mg TID PRN    sodium  chloride 0.9% bolus 250 mL PRN    tobramycin - pharmacy to dose pharmacy to manage frequency       Objective:     Vital Signs (Most Recent):  Temp: 99.2 °F (37.3 °C) (06/23/22 1500)  Pulse: 104 (06/23/22 1625)  Resp: (!) 38 (06/23/22 1625)  BP: (!) 107/41 (06/23/22 1741)  SpO2: 100 % (06/23/22 1625)  O2 Device (Oxygen Therapy): nasal cannula (06/23/22 1244)   Vital Signs (24h Range):  Temp:  [98.2 °F (36.8 °C)-101.4 °F (38.6 °C)] 99.2 °F (37.3 °C)  Pulse:  [] 104  Resp:  [19-48] 38  SpO2:  [81 %-100 %] 100 %  BP: ()/(39-57) 107/41     Weight: 72.3 kg (159 lb 6.3 oz) (06/23/22 0900)  Body mass index is 22.23 kg/m².  Body surface area is 1.9 meters squared.    I/O last 3 completed shifts:  In: 1150 [P.O.:240; NG/GT:810; IV Piggyback:100]  Out: 181 [Other:81; Stool:100]    Physical Exam  Vitals reviewed.   HENT:      Head: Normocephalic.      Mouth/Throat:      Mouth: Mucous membranes are moist.   Cardiovascular:      Rate and Rhythm: Regular rhythm.      Pulses: Normal pulses.   Pulmonary:      Effort: Pulmonary effort is normal.   Abdominal:      Palpations: Abdomen is soft.   Neurological:      Mental Status: He is alert.       Significant Labs:  BMP:   Recent Labs   Lab 06/20/22 0315   GLU 88      K 4.5   CL 99   CO2 27   *   CREATININE 2.98*   CALCIUM 9.2     CBC:   Recent Labs   Lab 06/20/22 0315   WBC 14.04*   RBC 2.43*   HGB 7.5*   HCT 23.5*   *   MCV 96.7*   MCH 30.9   MCHC 31.9*        Significant Imaging:  Labs: Reviewed    Assessment/Plan:     ESRD (end stage renal disease)    6/19/2022  Dialysis as scheduled.  6/20/2022 Dialysis today.  6/21/2022 Continue with dialysis as scheduled.  6/22/2022  Dialysis sessions are going well.  6/23/2022  Dialysis on Friday.    Type 2 diabetes mellitus without complication, without long-term current use of insulin  Continue current treatment  Chronic condition  Glucoses are acceptable.  6/13/2022 Condition is stable.  6/17/2022   Stable.  6/19/2022  Continue with current treatment  6/23/2022 Condition is stable.        Thank you for your consult. I will follow-up with patient. Please contact us if you have any additional questions.    Edwin Pryor Jr, MD  Nephrology  [facility]

## 2022-06-24 PROBLEM — I46.9 PEA (PULSELESS ELECTRICAL ACTIVITY): Status: ACTIVE | Noted: 2022-01-01

## 2022-06-24 PROBLEM — J96.20 ACUTE ON CHRONIC RESPIRATORY FAILURE: Status: ACTIVE | Noted: 2022-01-01

## 2022-06-24 NOTE — SUBJECTIVE & OBJECTIVE
Interval History: The patient is weaker today.  Blood pressure has been low as well.  He is getting a fluid bolus.    Review of patient's allergies indicates:  No Known Allergies  Current Facility-Administered Medications   Medication Frequency    0.9%  NaCl infusion (for blood administration) Q24H PRN    0.9%  NaCl infusion PRN    acetaminophen tablet 500 mg Q6H PRN    acetic acid 0.25 % irrigation Daily PRN    acetylcysteine 200 mg/ml (20%) solution 2 mL TID WAKE    albuterol nebulizer solution 2.5 mg Q4H PRN    albuterol-ipratropium 2.5 mg-0.5 mg/3 mL nebulizer solution 3 mL Q6H    bisacodyL EC tablet 10 mg Daily PRN    collagenase ointment Daily PRN    dextrose 50% injection 12.5 g PRN    diltiaZEM tablet 90 mg Q6H    epoetin beth-epbx injection 10,000 Units Every Mon, Wed, Fri    glucagon (human recombinant) injection 1 mg PRN    guaiFENesin 100 mg/5 ml syrup 200 mg Q6H    heparin (porcine) injection 4,000 Units PRN    heparin (porcine) injection 5,000 Units Q8H    insulin aspart U-100 injection 1-10 Units Q6H    insulin detemir U-100 injection 15 Units QHS    linezolid tablet 600 mg Q12H    meropenem (MERREM) 500 mg in sodium chloride 0.9 % 100 mL IVPB (MB+) Q24H    metoprolol tartrate (LOPRESSOR) split tablet 12.5 mg BID    pantoprazole suspension 40 mg Daily    potassium iodide 1 gram/mL solution 5 drop QID    scopolamine 1.3-1.5 mg (1 mg over 3 days) 1 patch Q3 Days    sevelamer carbonate pwpk 0.8 g TID WM    simethicone chewable tablet 80 mg TID PRN    sodium chloride 0.9% bolus 250 mL PRN    sodium chloride 0.9% bolus 250 mL Once    tobramycin (NEBCIN) 220 mg in dextrose 5 % IVPB Once    tobramycin - pharmacy to dose pharmacy to manage frequency       Objective:     Vital Signs (Most Recent):  Temp: 99.4 °F (37.4 °C) (06/24/22 0300)  Pulse: 69 (06/24/22 1308)  Resp: (!) 38 (06/24/22 1308)  BP: (!) 76/24 (06/24/22 1147)  SpO2: 96 % (06/24/22 1308)  O2 Device (Oxygen Therapy): nasal cannula w/  humidification (06/24/22 0800)   Vital Signs (24h Range):  Temp:  [99 °F (37.2 °C)-99.7 °F (37.6 °C)] 99.4 °F (37.4 °C)  Pulse:  [] 69  Resp:  [16-43] 38  SpO2:  [87 %-100 %] 96 %  BP: ()/(24-57) 76/24     Weight: 78.7 kg (173 lb 8 oz) (06/24/22 0600)  Body mass index is 24.2 kg/m².  Body surface area is 1.99 meters squared.    I/O last 3 completed shifts:  In: 810 [NG/GT:810]  Out: 275 [Stool:275]    Physical Exam  Vitals reviewed.   Constitutional:       Appearance: He is ill-appearing.   HENT:      Head: Normocephalic.      Mouth/Throat:      Mouth: Mucous membranes are moist.   Cardiovascular:      Rate and Rhythm: Tachycardia present.   Pulmonary:      Effort: Pulmonary effort is normal.   Abdominal:      Palpations: Abdomen is soft.      Comments: Abdomen is distened       Significant Labs:  BMP:   Recent Labs   Lab 06/24/22 0317         K 4.8      CO2 28   BUN 89*   CREATININE 2.55*   CALCIUM 9.3     CBC:   Recent Labs   Lab 06/24/22 0317   WBC 20.74*   RBC 2.29*   HGB 7.0*   HCT 22.0*   *   MCV 96.1*   MCH 30.6   MCHC 31.8*        Significant Imaging:  Labs: Reviewed

## 2022-06-24 NOTE — PLAN OF CARE
Problem: Gas Exchange Impaired  Goal: Optimal Gas Exchange  Outcome: Ongoing, Progressing     Problem: Breathing Pattern Ineffective  Goal: Effective Breathing Pattern  Outcome: Ongoing, Progressing

## 2022-06-24 NOTE — ASSESSMENT & PLAN NOTE
6/19/2022  Dialysis as scheduled.  6/20/2022 Dialysis today.  6/21/2022 Continue with dialysis as scheduled.  6/22/2022  Dialysis sessions are going well.  6/23/2022  Dialysis on Friday.  6/24/2022 Continue with current management

## 2022-06-24 NOTE — SUBJECTIVE & OBJECTIVE
Interval History: No acute events overnight. He is currently resting comfortably. Oxygenating adequately on 2L nasal cannula. Tmax of 101 in last 24 hours but afebrile this am. HD today.    Objective:     Vital Signs (Most Recent):  Temp: 99.4 °F (37.4 °C) (06/24/22 0300)  Pulse: 71 (06/24/22 0900)  Resp: (!) 37 (06/24/22 0900)  BP: (!) 80/35 (06/24/22 0900)  SpO2: 95 % (06/24/22 0900)   Vital Signs (24h Range):  Temp:  [98.2 °F (36.8 °C)-99.7 °F (37.6 °C)] 99.4 °F (37.4 °C)  Pulse:  [] 71  Resp:  [16-48] 37  SpO2:  [81 %-100 %] 95 %  BP: ()/(35-57) 80/35     Weight: 78.7 kg (173 lb 8 oz)  Body mass index is 24.2 kg/m².      Intake/Output Summary (Last 24 hours) at 6/24/2022 0920  Last data filed at 6/23/2022 1800  Gross per 24 hour   Intake 810 ml   Output 175 ml   Net 635 ml         Physical Exam  Vitals reviewed.   Constitutional:       General: He is not in acute distress.     Appearance: He is not ill-appearing.      Comments: Chronically ill appearing   HENT:      Right Ear: External ear normal.      Left Ear: External ear normal.      Mouth/Throat:      Mouth: Mucous membranes are moist.   Eyes:      Conjunctiva/sclera: Conjunctivae normal.   Neck:      Trachea: Tracheostomy present.   Cardiovascular:      Rate and Rhythm: Normal rate and regular rhythm.   Pulmonary:      Effort: Pulmonary effort is normal.      Breath sounds: Normal breath sounds.   Abdominal:      General: Bowel sounds are normal.      Palpations: Abdomen is soft.   Musculoskeletal:         General: Normal range of motion.      Cervical back: Normal range of motion and neck supple.   Skin:     General: Skin is warm and dry.      Capillary Refill: Capillary refill takes less than 2 seconds.   Neurological:      Mental Status: He is alert. Mental status is at baseline.   Psychiatric:         Mood and Affect: Mood normal.       Vents:  Vent Mode: Standby (05/28/22 4255)  Ventilator Initiated: No (04/23/22 1934)  Set Rate: 0 BPM  (05/18/22 0516)  Vt Set: 0 mL (05/18/22 0516)  Pressure Support: 15 cmH20 (05/22/22 0044)  PEEP/CPAP: 5 cmH20 (05/22/22 0044)  Oxygen Concentration (%): 28 (06/24/22 0022)  Peak Airway Pressure: 21 cmH2O (05/22/22 0044)  Plateau Pressure: 20 cmH20 (03/07/22 0500)  Total Ve: 7.8 mL (05/22/22 0044)  F/VT Ratio<105 (RSBI): (!) 22.64 (05/22/22 0044)    Lines/Drains/Airways       Central Venous Catheter Line  Duration                  Hemodialysis Catheter 12/30/21 0900 right internal jugular 175 days              Drain  Duration                  Colostomy 12/18/21 1030 Descending/sigmoid  days         Gastrostomy/Enterostomy 03/09/22 1436 Percutaneous endoscopic gastrostomy (PEG) midline feeding 106 days              Airway  Duration                  Surgical Airway 04/27/22 0856 Shiley;Other (Comment) Cuffed;Long 58 days              Peripheral Intravenous Line  Duration                  Peripheral IV - Single Lumen 06/03/22 1527 18 G Anterior;Right Forearm 20 days                    Significant Labs:    CBC/Anemia Profile:  Recent Labs   Lab 06/24/22 0317   WBC 20.74*   HGB 7.0*   HCT 22.0*   *   MCV 96.1*   RDW 15.4*          Chemistries:  Recent Labs   Lab 06/24/22 0317      K 4.8      CO2 28   BUN 89*   CREATININE 2.55*   CALCIUM 9.3         All pertinent labs within the past 24 hours have been reviewed.    Significant Imaging:  I have reviewed all pertinent imaging results/findings within the past 24 hours.

## 2022-06-24 NOTE — PROGRESS NOTES
[facility]  Pulmonology  Progress Note    Patient Name: Og Garcia  MRN: [id number]  Admission Date: 12/23/2021  Hospital Length of Stay: 183 days  Code Status: Prior  Attending Provider: Cecil Abernathy DO  Primary Care Provider: Hector Arndt DNP, FNP-C   Principal Problem: Denice gangrene    Subjective:     Interval History: No acute events overnight. He is currently resting comfortably. Oxygenating adequately on 2L nasal cannula. Tmax of 101 in last 24 hours but afebrile this am. HD today.    Objective:     Vital Signs (Most Recent):  Temp: 99.4 °F (37.4 °C) (06/24/22 0300)  Pulse: 71 (06/24/22 0900)  Resp: (!) 37 (06/24/22 0900)  BP: (!) 80/35 (06/24/22 0900)  SpO2: 95 % (06/24/22 0900)   Vital Signs (24h Range):  Temp:  [98.2 °F (36.8 °C)-99.7 °F (37.6 °C)] 99.4 °F (37.4 °C)  Pulse:  [] 71  Resp:  [16-48] 37  SpO2:  [81 %-100 %] 95 %  BP: ()/(35-57) 80/35     Weight: 78.7 kg (173 lb 8 oz)  Body mass index is 24.2 kg/m².      Intake/Output Summary (Last 24 hours) at 6/24/2022 0920  Last data filed at 6/23/2022 1800  Gross per 24 hour   Intake 810 ml   Output 175 ml   Net 635 ml         Physical Exam  Vitals reviewed.   Constitutional:       General: He is not in acute distress.     Appearance: He is not ill-appearing.      Comments: Chronically ill appearing   HENT:      Right Ear: External ear normal.      Left Ear: External ear normal.      Mouth/Throat:      Mouth: Mucous membranes are moist.   Eyes:      Conjunctiva/sclera: Conjunctivae normal.   Neck:      Trachea: Tracheostomy present.   Cardiovascular:      Rate and Rhythm: Normal rate and regular rhythm.   Pulmonary:      Effort: Pulmonary effort is normal.      Breath sounds: Normal breath sounds.   Abdominal:      General: Bowel sounds are normal.      Palpations: Abdomen is soft.   Musculoskeletal:         General: Normal range of motion.      Cervical back: Normal range of motion and neck supple.    Skin:     General: Skin is warm and dry.      Capillary Refill: Capillary refill takes less than 2 seconds.   Neurological:      Mental Status: He is alert. Mental status is at baseline.   Psychiatric:         Mood and Affect: Mood normal.       Vents:  Vent Mode: Standby (05/28/22 0415)  Ventilator Initiated: No (04/23/22 1934)  Set Rate: 0 BPM (05/18/22 0516)  Vt Set: 0 mL (05/18/22 0516)  Pressure Support: 15 cmH20 (05/22/22 0044)  PEEP/CPAP: 5 cmH20 (05/22/22 0044)  Oxygen Concentration (%): 28 (06/24/22 0022)  Peak Airway Pressure: 21 cmH2O (05/22/22 0044)  Plateau Pressure: 20 cmH20 (03/07/22 0500)  Total Ve: 7.8 mL (05/22/22 0044)  F/VT Ratio<105 (RSBI): (!) 22.64 (05/22/22 0044)    Lines/Drains/Airways       Central Venous Catheter Line  Duration                  Hemodialysis Catheter 12/30/21 0900 right internal jugular 175 days              Drain  Duration                  Colostomy 12/18/21 1030 Descending/sigmoid  days         Gastrostomy/Enterostomy 03/09/22 1436 Percutaneous endoscopic gastrostomy (PEG) midline feeding 106 days              Airway  Duration                  Surgical Airway 04/27/22 0856 Shiley;Other (Comment) Cuffed;Long 58 days              Peripheral Intravenous Line  Duration                  Peripheral IV - Single Lumen 06/03/22 1527 18 G Anterior;Right Forearm 20 days                    Significant Labs:    CBC/Anemia Profile:  Recent Labs   Lab 06/24/22 0317   WBC 20.74*   HGB 7.0*   HCT 22.0*   *   MCV 96.1*   RDW 15.4*          Chemistries:  Recent Labs   Lab 06/24/22 0317      K 4.8      CO2 28   BUN 89*   CREATININE 2.55*   CALCIUM 9.3         All pertinent labs within the past 24 hours have been reviewed.    Significant Imaging:  I have reviewed all pertinent imaging results/findings within the past 24 hours.    Assessment/Plan:     * Denice gangrene  - Stable   - wound vac     Osteomyelitis  - Continue IV antibiotics per ID recommendations -  stop date 07/01   - He has been treated for multidrug resistant bacteria     ID note reviewed and appreciated:  1. Continue tobramycin and meropenem, both renally dosed, until 7/1  2. 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 Monitor temperature curve and white blood cell count     back to OR for debridement  6/10  Still running some fever at times. Repeat blood cultures are pending    Chronic respiratory failure  He is now doing well with nasal cannula  Trach capped  Still having some issues with secretions at times  Will hold off on pulling trach for now  Reassess on Monday  added scopolamine for secretions   Hopefully we will be able to pull trach in the next day or two  6/23- plan to pull trach tomorrow  6/24- going to hold off on pulling trach as patient is running some fever and on HD currently  Will reassess on Monday    Pressure ulcer of sacral region, stage 4  - Continue with wound care    Fever  Had a positive bone culture  ID note reviewed from 5/22-- continue IV antibiotics until 07/01/22  Still occasionally running fever despite antibiotics  Repeat blood cultures are pending---afebrile this am  WBC up to 20.74  CT abdomen with no obvious abscess    ESRD (end stage renal disease)  - Started on HD 12/30   - Continue with HD per nephrology  - Now on epogen      Type 2 diabetes mellitus without complication, without long-term current use of insulin  - accuchecks look good  - on levemir 15 units and sliding scale    Hypertension  Blood pressure on lower side this am  He is on cardizem and metoprolol (mainly for rate)            Still running some fever at times despite broad spectrum antibiotics. Only thing not currently being covered is fungal. We did repeat blood cultures.         Cecil Abernathy, DO  Pulmonology  [facility]

## 2022-06-24 NOTE — ASSESSMENT & PLAN NOTE
- Continue IV antibiotics per ID recommendations - stop date 07/01   - He has been treated for multidrug resistant bacteria     ID note reviewed and appreciated:  1. Continue tobramycin and meropenem, both renally dosed, until 7/1  2. 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 Monitor temperature curve and white blood cell count     back to OR for debridement  6/10  Still running some fever at times. Repeat blood cultures are pending

## 2022-06-24 NOTE — ASSESSMENT & PLAN NOTE
He is now doing well with nasal cannula  Trach capped  Still having some issues with secretions at times  Will hold off on pulling trach for now  Reassess on Monday  added scopolamine for secretions   Hopefully we will be able to pull trach in the next day or two  6/23- plan to pull trach tomorrow  6/24- going to hold off on pulling trach as patient is running some fever and on HD currently  Will reassess on Monday

## 2022-06-24 NOTE — PROGRESS NOTES
[facility]  Nephrology  Progress Note    Patient Name: Og Garcia  MRN: [id number]  Admission Date: 12/23/2021  Hospital Length of Stay: 183 days  Attending Provider: Cecil Abernathy DO   Primary Care Physician: Hector Arndt DNP, FNP-C  Principal Problem:Denice gangrene    Subjective:     HPI: The patient is known from the previous nephrology consult at [facility].  He is no at Specialty and continues to need dialysis support.      Interval History: The patient is weaker today.  Blood pressure has been low as well.  He is getting a fluid bolus.    Review of patient's allergies indicates:  No Known Allergies  Current Facility-Administered Medications   Medication Frequency    0.9%  NaCl infusion (for blood administration) Q24H PRN    0.9%  NaCl infusion PRN    acetaminophen tablet 500 mg Q6H PRN    acetic acid 0.25 % irrigation Daily PRN    acetylcysteine 200 mg/ml (20%) solution 2 mL TID WAKE    albuterol nebulizer solution 2.5 mg Q4H PRN    albuterol-ipratropium 2.5 mg-0.5 mg/3 mL nebulizer solution 3 mL Q6H    bisacodyL EC tablet 10 mg Daily PRN    collagenase ointment Daily PRN    dextrose 50% injection 12.5 g PRN    diltiaZEM tablet 90 mg Q6H    epoetin beth-epbx injection 10,000 Units Every Mon, Wed, Fri    glucagon (human recombinant) injection 1 mg PRN    guaiFENesin 100 mg/5 ml syrup 200 mg Q6H    heparin (porcine) injection 4,000 Units PRN    heparin (porcine) injection 5,000 Units Q8H    insulin aspart U-100 injection 1-10 Units Q6H    insulin detemir U-100 injection 15 Units QHS    linezolid tablet 600 mg Q12H    meropenem (MERREM) 500 mg in sodium chloride 0.9 % 100 mL IVPB (MB+) Q24H    metoprolol tartrate (LOPRESSOR) split tablet 12.5 mg BID    pantoprazole suspension 40 mg Daily    potassium iodide 1 gram/mL solution 5 drop QID    scopolamine 1.3-1.5 mg (1 mg over 3 days) 1 patch Q3 Days    sevelamer carbonate pwpk 0.8 g TID WM    simethicone  chewable tablet 80 mg TID PRN    sodium chloride 0.9% bolus 250 mL PRN    sodium chloride 0.9% bolus 250 mL Once    tobramycin (NEBCIN) 220 mg in dextrose 5 % IVPB Once    tobramycin - pharmacy to dose pharmacy to manage frequency       Objective:     Vital Signs (Most Recent):  Temp: 99.4 °F (37.4 °C) (06/24/22 0300)  Pulse: 69 (06/24/22 1308)  Resp: (!) 38 (06/24/22 1308)  BP: (!) 76/24 (06/24/22 1147)  SpO2: 96 % (06/24/22 1308)  O2 Device (Oxygen Therapy): nasal cannula w/ humidification (06/24/22 0800)   Vital Signs (24h Range):  Temp:  [99 °F (37.2 °C)-99.7 °F (37.6 °C)] 99.4 °F (37.4 °C)  Pulse:  [] 69  Resp:  [16-43] 38  SpO2:  [87 %-100 %] 96 %  BP: ()/(24-57) 76/24     Weight: 78.7 kg (173 lb 8 oz) (06/24/22 0600)  Body mass index is 24.2 kg/m².  Body surface area is 1.99 meters squared.    I/O last 3 completed shifts:  In: 810 [NG/GT:810]  Out: 275 [Stool:275]    Physical Exam  Vitals reviewed.   Constitutional:       Appearance: He is ill-appearing.   HENT:      Head: Normocephalic.      Mouth/Throat:      Mouth: Mucous membranes are moist.   Cardiovascular:      Rate and Rhythm: Tachycardia present.   Pulmonary:      Effort: Pulmonary effort is normal.   Abdominal:      Palpations: Abdomen is soft.      Comments: Abdomen is distened       Significant Labs:  BMP:   Recent Labs   Lab 06/24/22 0317         K 4.8      CO2 28   BUN 89*   CREATININE 2.55*   CALCIUM 9.3     CBC:   Recent Labs   Lab 06/24/22 0317   WBC 20.74*   RBC 2.29*   HGB 7.0*   HCT 22.0*   *   MCV 96.1*   MCH 30.6   MCHC 31.8*        Significant Imaging:  Labs: Reviewed    Assessment/Plan:     Tachycardia  The patient is getting a fluid bolus    ESRD (end stage renal disease)    6/19/2022  Dialysis as scheduled.  6/20/2022 Dialysis today.  6/21/2022 Continue with dialysis as scheduled.  6/22/2022  Dialysis sessions are going well.  6/23/2022  Dialysis on Friday.  6/24/2022 Continue with current  management        Thank you for your consult. I will follow-up with patient. Please contact us if you have any additional questions.    Edwin Pryor Jr, MD  Nephrology  [facility]

## 2022-06-24 NOTE — PROGRESS NOTES
Pharmacokinetic Follow Up: Tobramycin    Assessment of levels:   Today's random tobra level of 1.3 is within the desired range of </= 2    Regimen Plan:   Tobra 220 mg IV x 1 today with a random tobra level ordered for 6/29 at 0600    Drug levels (last 3 results):  No results for input(s): AMIKACINPEAK, AMIKACINTROU, AMIKACINRAND, AMIKACIN in the last 72 hours.    No results for input(s): GENTAMICIN, GENTPEAK, GENTTROUGH, GENT10, GENT12, GENT8, GENTRANDOM in the last 72 hours.    Recent Labs   Lab Result Units 06/24/22  0317   Tobramycin, Random µg/mL 1.3       Pharmacy will continue to monitor.    Please contact pharmacy at extension [phone] with any questions regarding this assessment.    Patient brief summary:  Og Garcia is a 67 y.o. male initiated on aminoglycoside therapy for treatment of bone/joint infection    Drug Allergies:   Review of patient's allergies indicates:  No Known Allergies    Actual Body Weight:   78.7 kg    Adjust Body Weight:   76.7 kg    Ideal Body Weight:  75.3 kg    Renal Function:   Estimated Creatinine Clearance: 29.9 mL/min (A) (based on SCr of 2.55 mg/dL (H)).,     Dialysis Method (if applicable):  intermittent HD    CBC (last 72 hours):  Recent Labs   Lab Result Units 06/24/22  0317   WBC K/uL 20.74*   Hemoglobin g/dL 7.0*   Hematocrit % 22.0*   Platelet Count K/uL 453*   Lymphocytes % % 13.6*   Lymphocytes, Man % % 7*   Monocytes % % 9.2*   Monocytes, Man % % 5   Eosinophils % % 4.8*   Eosinophils, Man % % 8*   Basophils % % 0.3       Metabolic Panel (last 72 hours):  Recent Labs   Lab Result Units 06/24/22  0317   Sodium mmol/L 139   Potassium mmol/L 4.8   Chloride mmol/L 101   CO2 mmol/L 28   Glucose mg/dL 104   BUN mg/dL 89*   Creatinine mg/dL 2.55*       Aminoglycoside Administrations:  aminoglycosides given in last 96 hours                     tobramycin (NEBCIN) 220 mg in dextrose 5 % IVPB (mg) 220 mg New Bag 06/20/22 0656                    Microbiologic  Results:  Microbiology Results (last 7 days)       Procedure Component Value Units Date/Time    Blood culture (site 1) [[id number]] Collected: 06/24/22 0919    Order Status: Sent Specimen: Blood Updated: 06/24/22 0923    Blood culture (site 2) [[id number]] Collected: 06/24/22 0919    Order Status: Sent Specimen: Blood Updated: 06/24/22 0923    Blood culture [[id number]] Collected: 06/12/22 1654    Order Status: Completed Specimen: Blood Updated: 06/18/22 0730     Culture, Blood No Growth at 5 days    Blood culture [[id number]] Collected: 06/12/22 1654    Order Status: Completed Specimen: Blood Updated: 06/18/22 0730     Culture, Blood No Growth at 5 days

## 2022-06-24 NOTE — ASSESSMENT & PLAN NOTE
Had a positive bone culture  ID note reviewed from 5/22-- continue IV antibiotics until 07/01/22  Still occasionally running fever despite antibiotics  Repeat blood cultures are pending---afebrile this am  WBC up to 20.74  CT abdomen with no obvious abscess

## 2022-06-25 PROBLEM — Z51.5 PALLIATIVE CARE ENCOUNTER: Status: ACTIVE | Noted: 2022-01-01

## 2022-06-25 PROBLEM — J69.0 ASPIRATION PNEUMONIA: Status: ACTIVE | Noted: 2022-01-01

## 2022-06-25 NOTE — NURSING
Large vomiting episode during wound care, tube feeding turned off at this time. Spoke with Dr. Abernathy about patient low bp, order rec'd for saline bolus.

## 2022-06-25 NOTE — ASSESSMENT & PLAN NOTE
I had a discussion with Shari cormier regarding Mr Garcia's current set back. We discussed the prolonged and difficult hospital course he has had. She had experienced a similar scenario with her mother and does not want this for Mr Garcia. We discussed what his goals and wishes would be. She is in agreement for DNR status and this will be changed. He has children, but she has been tasked as POA/decision maker. We discussed transition to full comfort measures with removal of life support. She wants this direction, she will discuss with his children but likely proceed with palliative care this afternoon.

## 2022-06-25 NOTE — PROGRESS NOTES
Code blue called  Patient was found to be in PEA with apnea  CPR was started immediately and after 1 dose of epinephrine, ROSC was achieved.  Patient was placed back on ventilator.  Patient's chest x-ray appeared worse from the previous one a week ago.  Patient is already on Zyvox, Merrem, and tobramycin for osteomyelitis.  Feel that patient would benefit from diagnostic thoracentesis.  Patient was started on Levophed gtt earlier today for hypotension. Will continue to titrate Levophed gtt to maintain MAP > 65. Should patient require more than 2 mcg per kg per minute, will add vasopressin gtt.  Spent 32 minutes in critical care time

## 2022-06-25 NOTE — ASSESSMENT & PLAN NOTE
- accuchecks look good  - on levemir 15 units and sliding scale  - holding feeds due to aspiration  - stop levemir

## 2022-06-25 NOTE — SUBJECTIVE & OBJECTIVE
Interval History:  remains febrile. Back on ventilator after aspiration event. Requiring vasopressors. Family present today.    Objective:     Vital Signs (Most Recent):  Temp: 99.7 °F (37.6 °C) (06/25/22 0700)  Pulse: 72 (06/25/22 0800)  Resp: (!) 30 (06/25/22 0800)  BP: (!) 59/31 (06/25/22 0715)  SpO2: 98 % (06/25/22 0800)   Vital Signs (24h Range):  Temp:  [97.6 °F (36.4 °C)-99.7 °F (37.6 °C)] 99.7 °F (37.6 °C)  Pulse:  [] 72  Resp:  [12-48] 30  SpO2:  [60 %-100 %] 98 %  BP: ()/(23-89) 59/31     Weight: 81.5 kg (179 lb 10.8 oz)  Body mass index is 25.06 kg/m².      Intake/Output Summary (Last 24 hours) at 6/25/2022 0914  Last data filed at 6/24/2022 1150  Gross per 24 hour   Intake 50 ml   Output --   Net 50 ml       Physical Exam  Vitals reviewed.   Constitutional:       Appearance: He is ill-appearing and diaphoretic.      Comments: Unresponsive not on sedation   HENT:      Head:      Comments: Right temporal deformity     Nose: Nose normal.      Mouth/Throat:      Mouth: Mucous membranes are moist.   Eyes:      Conjunctiva/sclera: Conjunctivae normal.      Pupils: Pupils are equal, round, and reactive to light.      Comments: Enucleated eye   Cardiovascular:      Rate and Rhythm: Regular rhythm. Tachycardia present.      Pulses: Normal pulses.      Heart sounds: Normal heart sounds.   Pulmonary:      Effort: No respiratory distress.      Breath sounds: No stridor. No wheezing, rhonchi or rales.      Comments: Ventilator driven breath sounds bilaterally, coarse sounds  Abdominal:      General: There is distension.      Palpations: Abdomen is soft.      Tenderness: There is no guarding.      Comments: Protruding anterior abdominal wall. PEG in place. Absent bowel sounds   Genitourinary:     Comments: Packing in place  Musculoskeletal:         General: No swelling or deformity.      Cervical back: Neck supple.      Right lower leg: No edema.      Left lower leg: No edema.   Lymphadenopathy:       Cervical: No cervical adenopathy.   Skin:     General: Skin is warm.   Neurological:      Comments: On ventilator, unresponsive off of sedation       Vents:  Vent Mode: A/C (06/25/22 0405)  Ventilator Initiated: Yes (06/24/22 2130)  Set Rate: 18 BPM (06/25/22 0405)  Vt Set: 500 mL (06/25/22 0405)  Pressure Support: 15 cmH20 (05/22/22 0044)  PEEP/CPAP: 5 cmH20 (06/25/22 0405)  Oxygen Concentration (%): 80 (06/25/22 0405)  Peak Airway Pressure: 24 cmH2O (06/25/22 0405)  Plateau Pressure: 20 cmH20 (03/07/22 0500)  Total Ve: 12.2 mL (06/25/22 0405)  F/VT Ratio<105 (RSBI): (!) 76.92 (06/24/22 2130)    Lines/Drains/Airways       Central Venous Catheter Line  Duration                  Hemodialysis Catheter 12/30/21 0900 right internal jugular 176 days              Drain  Duration                  Colostomy 12/18/21 1030 Descending/sigmoid  days         Gastrostomy/Enterostomy 03/09/22 1436 Percutaneous endoscopic gastrostomy (PEG) midline feeding 107 days              Airway  Duration                  Surgical Airway 04/27/22 0856 Marychuy;Other (Comment) Cuffed;Long 59 days              Peripheral Intravenous Line  Duration                  Peripheral IV - Single Lumen 06/03/22 1527 18 G Anterior;Right Forearm 21 days                    Significant Labs:    CBC/Anemia Profile:  Recent Labs   Lab 06/24/22 0317   WBC 20.74*   HGB 7.0*   HCT 22.0*   *   MCV 96.1*   RDW 15.4*        Chemistries:  Recent Labs   Lab 06/24/22 0317      K 4.8      CO2 28   BUN 89*   CREATININE 2.55*   CALCIUM 9.3       All pertinent labs within the past 24 hours have been reviewed.    Significant Imaging:  I have reviewed all pertinent imaging results/findings within the past 24 hours.

## 2022-06-25 NOTE — ASSESSMENT & PLAN NOTE
Had a positive bone culture  ID note reviewed from 5/22-- continue IV antibiotics until 07/01/22  Still occasionally running fever despite antibiotics  Repeat blood cultures are pending  CT abdomen with no obvious abscess  Repeat KUB

## 2022-06-25 NOTE — ASSESSMENT & PLAN NOTE
- was doing well with nasal cannula, Trach capped  - decompensated after vomiting episode on 6/24, now back on AC  - minimally responsive on ventilator

## 2022-06-25 NOTE — NURSING
Notified Dr. Abernathy of patient bp still low after 500ml saline bolus, order rec'd to begin levophed.

## 2022-06-25 NOTE — PROGRESS NOTES
[facility]  Pulmonology  Progress Note    Patient Name: Og Garcia  MRN: [id number]  Admission Date: 12/23/2021  Hospital Length of Stay: 184 days  Code Status: DNR  Attending Provider: Cecil Abernathy DO  Primary Care Provider: Hector Arndt DNP, FNP-C   Principal Problem: Septic shock    Subjective:     Interval History:  remains febrile. Back on ventilator after aspiration event. Requiring vasopressors. Family present today.    Objective:     Vital Signs (Most Recent):  Temp: 99.7 °F (37.6 °C) (06/25/22 0700)  Pulse: 72 (06/25/22 0800)  Resp: (!) 30 (06/25/22 0800)  BP: (!) 59/31 (06/25/22 0715)  SpO2: 98 % (06/25/22 0800)   Vital Signs (24h Range):  Temp:  [97.6 °F (36.4 °C)-99.7 °F (37.6 °C)] 99.7 °F (37.6 °C)  Pulse:  [] 72  Resp:  [12-48] 30  SpO2:  [60 %-100 %] 98 %  BP: ()/(23-89) 59/31     Weight: 81.5 kg (179 lb 10.8 oz)  Body mass index is 25.06 kg/m².      Intake/Output Summary (Last 24 hours) at 6/25/2022 0914  Last data filed at 6/24/2022 1150  Gross per 24 hour   Intake 50 ml   Output --   Net 50 ml       Physical Exam  Vitals reviewed.   Constitutional:       Appearance: He is ill-appearing and diaphoretic.      Comments: Unresponsive not on sedation   HENT:      Head:      Comments: Right temporal deformity     Nose: Nose normal.      Mouth/Throat:      Mouth: Mucous membranes are moist.   Eyes:      Conjunctiva/sclera: Conjunctivae normal.      Pupils: Pupils are equal, round, and reactive to light.      Comments: Enucleated eye   Cardiovascular:      Rate and Rhythm: Regular rhythm. Tachycardia present.      Pulses: Normal pulses.      Heart sounds: Normal heart sounds.   Pulmonary:      Effort: No respiratory distress.      Breath sounds: No stridor. No wheezing, rhonchi or rales.      Comments: Ventilator driven breath sounds bilaterally, coarse sounds  Abdominal:      General: There is distension.      Palpations: Abdomen is soft.       Tenderness: There is no guarding.      Comments: Protruding anterior abdominal wall. PEG in place. Absent bowel sounds   Genitourinary:     Comments: Packing in place  Musculoskeletal:         General: No swelling or deformity.      Cervical back: Neck supple.      Right lower leg: No edema.      Left lower leg: No edema.   Lymphadenopathy:      Cervical: No cervical adenopathy.   Skin:     General: Skin is warm.   Neurological:      Comments: On ventilator, unresponsive off of sedation       Vents:  Vent Mode: A/C (06/25/22 0405)  Ventilator Initiated: Yes (06/24/22 2130)  Set Rate: 18 BPM (06/25/22 0405)  Vt Set: 500 mL (06/25/22 0405)  Pressure Support: 15 cmH20 (05/22/22 0044)  PEEP/CPAP: 5 cmH20 (06/25/22 0405)  Oxygen Concentration (%): 80 (06/25/22 0405)  Peak Airway Pressure: 24 cmH2O (06/25/22 0405)  Plateau Pressure: 20 cmH20 (03/07/22 0500)  Total Ve: 12.2 mL (06/25/22 0405)  F/VT Ratio<105 (RSBI): (!) 76.92 (06/24/22 2130)    Lines/Drains/Airways       Central Venous Catheter Line  Duration                  Hemodialysis Catheter 12/30/21 0900 right internal jugular 176 days              Drain  Duration                  Colostomy 12/18/21 1030 Descending/sigmoid  days         Gastrostomy/Enterostomy 03/09/22 1436 Percutaneous endoscopic gastrostomy (PEG) midline feeding 107 days              Airway  Duration                  Surgical Airway 04/27/22 0856 Shiley;Other (Comment) Cuffed;Long 59 days              Peripheral Intravenous Line  Duration                  Peripheral IV - Single Lumen 06/03/22 1527 18 G Anterior;Right Forearm 21 days                    Significant Labs:    CBC/Anemia Profile:  Recent Labs   Lab 06/24/22 0317   WBC 20.74*   HGB 7.0*   HCT 22.0*   *   MCV 96.1*   RDW 15.4*        Chemistries:  Recent Labs   Lab 06/24/22 0317      K 4.8      CO2 28   BUN 89*   CREATININE 2.55*   CALCIUM 9.3       All pertinent labs within the past 24 hours have been  reviewed.    Significant Imaging:  I have reviewed all pertinent imaging results/findings within the past 24 hours.    Assessment/Plan:     * Septic shock  - after decompensation he is requiring levophed today  - suspect aspiration as source  - continue abx  - KUB today to evaluate for bowel obstruction    Palliative care encounter  I had a discussion with Shari cormier regarding Mr Garcia's current set back. We discussed the prolonged and difficult hospital course he has had. She had experienced a similar scenario with her mother and does not want this for Mr Garcia. We discussed what his goals and wishes would be. She is in agreement for DNR status and this will be changed. He has children, but she has been tasked as POA/decision maker. We discussed transition to full comfort measures with removal of life support. She wants this direction, she will discuss with his children but likely proceed with palliative care this afternoon.    Aspiration pneumonia  - 6/24  - already on merrem  - CXR consistent with aspiration event    Osteomyelitis  - Continue IV antibiotics per ID recommendations - stop date 07/01   - He has been treated for multidrug resistant bacteria     ID note reviewed and appreciated:  1. Continue tobramycin and meropenem, both renally dosed, until 7/1  2. 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 Monitor temperature curve and white blood cell count     back to OR for debridement  6/10  Still running some fever at times. Repeat blood cultures are pending    Chronic respiratory failure  - was doing well with nasal cannula, Trach capped  - decompensated after vomiting episode on 6/24, now back on AC  - minimally responsive on ventilator    Pressure ulcer of sacral region, stage 4  - Continue with wound care    Fever  Had a positive bone culture  ID note reviewed from 5/22--  continue IV antibiotics until 07/01/22  Still occasionally running fever despite antibiotics  Repeat blood cultures are pending  CT abdomen with no obvious abscess  Repeat KUB    ESRD (end stage renal disease)  - Started on HD 12/30   - Continue with HD per nephrology  - Now on epogen      Type 2 diabetes mellitus without complication, without long-term current use of insulin  - accuchecks look good  - on levemir 15 units and sliding scale  - holding feeds due to aspiration  - stop levemir    Denice gangrene  - Stable   - wound vac     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 Family discussion regarding goals of care. Likely transition to comfort care. Now DNR. Critical care time: 35 minutes.          Raul Hall MD  Pulmonology  [facility]

## 2022-06-25 NOTE — ASSESSMENT & PLAN NOTE
- after decompensation he is requiring levophed today  - suspect aspiration as source  - continue abx  - KUB today to evaluate for bowel obstruction

## 2022-06-26 VITALS
OXYGEN SATURATION: 98 % | WEIGHT: 179.69 LBS | SYSTOLIC BLOOD PRESSURE: 54 MMHG | RESPIRATION RATE: 18 BRPM | HEART RATE: 37 BPM | BODY MASS INDEX: 25.16 KG/M2 | HEIGHT: 71 IN | DIASTOLIC BLOOD PRESSURE: 29 MMHG | TEMPERATURE: 99 F

## 2022-06-26 NOTE — PROGRESS NOTES
Called to evaluate patient.  Asystole on heart monitor.  Patient DNR.  Patient had no heart or breath sounds auscultated.  Pupils were fixed and dilated.  He had been placed on ventilator (trach) and levophed started.  Patient with ESRD on HD.  He had been admitted with Denice gangrene, osteomyelitis from a sacral wound as well as Type 2 DM.  Patient was placed on vent after vomiting yesterday and felt to have aspiration PNA.      Time of death 2030.  Family notified.

## 2022-06-27 NOTE — PLAN OF CARE
[facility] - Periop Services  Discharge Final Note    Primary Care Provider: Hector Arndt DNP, KAMRANP-C    Expected Discharge Date: 6/10/2022    Final Discharge Note (most recent)     Final Note - 22 1514        Final Note    Assessment Type Final Discharge Note     Anticipated Discharge Disposition                  Important Message from Medicare         pt .

## 2022-06-30 LAB
BACTERIA BLD CULT: NORMAL
BACTERIA BLD CULT: NORMAL

## 2022-07-02 PROBLEM — N49.3 FOURNIER GANGRENE: Status: RESOLVED | Noted: 2021-01-01 | Resolved: 2022-07-02
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
## 2022-09-14 NOTE — ASSESSMENT & PLAN NOTE
Received request via: Patient    Was the patient seen in the last year in this department? Yes    Does the patient have an active prescription (recently filled or refills available) for medication(s) requested? No    accuchecks look ok  Continue with current care

## 2022-10-03 NOTE — ASSESSMENT & PLAN NOTE
Currently hypotensive. Holding any antihypertensives  BP looks a little better this am. Weaning pressor as tolerated  1/21- No longer on pressor.    Discharge Instructions For Your Heart Catheterization/Stent with Radial/Wrist Site    Activity: For the next 24 hours  Follow these guidelines related to the sedation medicine that you've received.   You must have someone drive you home.  You may feel tired, unsteady, or not quite yourself for the remainder of the day due to the sedation medicine. Your body gets rid of the medicine usually in 24 hours.  Do not drive or operate machinery or power tools.  Do not drink alcohol or smoke.  Do not make any important decisions or sign important papers.    Activity:   Follow these guidelines related to the puncture site in your wrist.  Minimal use of the arm used for the procedure for the remainder of the day. If possible, elevate your arm on a pillow and don't bend your wrist.  No lifting more than 5 pounds for 5 days with the arm used for the procedure.  If you do heavy physical work on your job, follow your doctor's instructions about when you may return to work.  Use ice pack for discomfort.  If you have a wrist immobilizer, remove the following morning.    Procedure Site Care:  Keep the dressing on your procedure site for the remainder of the day. The following day, you may remove the dressing and shower. Gently cleanse the procedure site with soap and water and a clean wash cloth and pat dry.   Apply a new Band-aid on the puncture site for 1 day;  Do not apply lotions, powders, or creams.  No soaking the wrist in water (i.e., bathtub, hot tub, dish water, pool of water) until the wound has completely healed (3-5 days).    Common side effects you may notice  Soreness at puncture site.  Slight bruising at the site for several days to several weeks.  Lump the size of a pea or marble at the puncture site for a few weeks.    Diet/Fluids  Resume diet as prior to admission.  If you had a catheterization or stent, drink plenty of liquids to help flush the dye used for your procedure out of your body (unless you're on a fluid  restriction ordered by your physician).    Medications  Take mild pain reliever such as Tylenol/Acetaminophen as needed for pain.    Call your doctor with these issues  Bleeding from the procedure site. If significant bleeding occurs or there is a growing lump at your site, apply firm pressure at the site where your dressing is. Call 911 and keep pressure on the site.  Numbness, tingling or color change in the arm used for puncture site.  Increasing pain and firmness near the puncture site.  A temperature greater than 101 degrees.  Redness or drainage around site.

## 2023-02-15 NOTE — PROGRESS NOTES
[facility]  Nephrology  Progress Note    Patient Name: Og Garcia  MRN: [id number]  Admission Date: 12/23/2021  Hospital Length of Stay: 115 days  Attending Provider: Cecil Abernathy DO   Primary Care Physician: Hector Arndt DNP, FNP-C  Principal Problem:Denice gangrene    Subjective:     HPI: The patient is known from the previous nephrology consult at [facility].  He is no at Specialty and continues to need dialysis support.      Interval History: The patient  is resting.  No acute changes.    Review of patient's allergies indicates:  No Known Allergies  Current Facility-Administered Medications   Medication Frequency    0.9%  NaCl infusion (for blood administration) Q24H PRN    0.9%  NaCl infusion (for blood administration) Q24H PRN    0.9%  NaCl infusion PRN    acetaminophen tablet 500 mg Q6H PRN    albuterol nebulizer solution 2.5 mg Q4H PRN    albuterol-ipratropium 2.5 mg-0.5 mg/3 mL nebulizer solution 3 mL Q6H    bisacodyL EC tablet 10 mg Daily PRN    calcium gluconate 1 g in dextrose 5 % in water (D5W) 5 % 100 mL IVPB (MB+) Once    dextrose 50% injection 12.5 g PRN    diltiaZEM tablet 90 mg Q6H    glucagon (human recombinant) injection 1 mg PRN    guaiFENesin 100 mg/5 ml syrup 200 mg Q6H    heparin (porcine) injection 4,000 Units PRN    heparin (porcine) injection 5,000 Units Q8H    HYDROcodone-acetaminophen 5-325 mg per tablet 1 tablet Q12H    insulin aspart U-100 injection 1-10 Units Q6H    insulin detemir U-100 injection 25 Units QHS    metoprolol injection 5 mg Q4H PRN    morphine injection 4 mg Q4H PRN    NORepinephrine 4 mg in dextrose 5 % 250 mL infusion Continuous    ondansetron injection 8 mg Q6H PRN    pantoprazole suspension 40 mg Daily    predniSONE tablet 5 mg Daily    sevelamer carbonate pwpk 0.8 g TID WM    silver sulfADIAZINE 1% cream Daily PRN    simethicone chewable tablet 80 mg TID PRN    vancomycin - pharmacy to dose pharmacy to  manage frequency       Objective:     Vital Signs (Most Recent):  Temp: 98.5 °F (36.9 °C) (04/17/22 0705)  Pulse: 105 (04/17/22 1224)  Resp: (!) 26 (04/17/22 1331)  BP: 127/62 (04/17/22 1157)  SpO2: 100 % (04/17/22 1224)  O2 Device (Oxygen Therapy): ventilator (04/17/22 1310)   Vital Signs (24h Range):  Temp:  [97.2 °F (36.2 °C)-99.9 °F (37.7 °C)] 98.5 °F (36.9 °C)  Pulse:  [] 105  Resp:  [13-35] 26  SpO2:  [100 %] 100 %  BP: ()/(54-69) 127/62     Weight: 81 kg (178 lb 9.2 oz) (04/17/22 0600)  Body mass index is 24.91 kg/m².  Body surface area is 2.01 meters squared.    I/O last 3 completed shifts:  In: 3470 [P.O.:360; Other:400; NG/GT:2610; IV Piggyback:100]  Out: 600 [Stool:600]    Physical Exam  Vitals reviewed.   HENT:      Head: Atraumatic.      Mouth/Throat:      Mouth: Mucous membranes are moist.   Cardiovascular:      Rate and Rhythm: Regular rhythm.   Pulmonary:      Effort: Pulmonary effort is normal.   Abdominal:      General: Abdomen is flat.   Neurological:      Mental Status: He is alert.   Psychiatric:         Mood and Affect: Mood normal.       Significant Labs:  BMP: No results for input(s): GLU, NA, K, CL, CO2, BUN, CREATININE, CALCIUM, MG in the last 168 hours.  CBC:   Recent Labs   Lab 04/16/22  1031   WBC 24.44*   RBC 2.14*   HGB 6.6*   HCT 19.2*   *   MCV 89.7   MCH 30.8   MCHC 34.4        Significant Imaging:  Labs: Reviewed    Assessment/Plan:     ESRD (end stage renal disease)  Dialysis MWF  Continue with scheduled hemodialysis  4/12/2022  Dialysis sessions are going well.  4/13/2022 Continue with scheduled hemodialysis for this patient.  4/14/2022  Continue with dialysis  4/16/2022 Chronic condition which is stable.  4/17/2022  Dialysis as scheduled.        Thank you for your consult. I will follow-up with patient. Please contact us if you have any additional questions.    Edwin Pryor Jr, MD  Nephrology  [facility]   Render Risk Assessment In Note?: no Detail Level: Zone Additional Notes: Discussed with patient if area enlarges or becomes painful will refer to Dr. Cabezas plastic surgeon for an excision

## 2023-02-15 NOTE — H&P
Patient to go to the OR today for tracheostomy and washout of the abdomen and the pelvic area.  Risks and benefits explained all questions were answered   Attending Attestation (For Attendings USE Only)...

## 2023-03-03 NOTE — NURSING
Patient back in room post PEG insertion, site is dry and intact. Patient stable.   Your child was evaluated after a fall. X-rays were taken of the right shoulder and clavicle which showed no acute fractures. If she continues to have pain or difficulty with motion repeat images should be taken. A CT of the head and neck were completed which showed no acute fractures or intracranial bleeding. Your child was sedated for the CT and improved well after the sedation had worn off. While in the ED your child received a dose of Tylenol. Please give Tylenol or Motrin at home as needed for discomfort or fever. You may give 6 mL of childrens tylenol (160 mg/5mL) every 6 hours. You may give 6 mL of childrens motrin (100mg/5mL) every 6 hours. Please follow-up with your pediatrician in the next week. Please return to the ED if your child develops worsening vomiting, poor oral intake, fever, chills, abnormal behavior, seizure, worsening swelling to the scalp, inability or refusal to move any extremity.

## 2023-05-16 NOTE — DIALYSIS ROUNDING
"          Nephrology Department            NAME: Og Garcia   YOB: 1955  MRN: [id number]  NOTE DATE: 12/22/2021       Seen on dialysis in the ICU.  He is tolerating the procedure.    Patient complains:  None.      REVIEW OF SYSTEMS:  he reports no shortness of breath, nausea or vomiting. No acute changes.    VITALS:  height is 5' 11" (1.803 m) and weight is 97.8 kg (215 lb 9.8 oz). His axillary temperature is 97.6 °F (36.4 °C). His blood pressure is 116/58 (abnormal) and his pulse is 90. His respiration is 19 and oxygen saturation is 98%.  Hemodialysis documentation flowsheet reviewed with dialysis nurse, including weight and vitals.    Resting comfortably, NAD.  Respiration unlabored. Lungs clear. The patient is ventilated.  Heart regular, no rub.  Abdomen soft, nontender, positive bowl sounds  No edema.    Recent Labs   Lab 12/17/21  0916 12/18/21  0508 12/20/21  0432 12/21/21  0723 12/22/21 0439      < > 131* 131* 129*   K 3.5   < > 6.4* 5.5* 6.2*      < > 93* 94* 90*   CO2 21   < > 16* 20* 15*   BUN 61*   < > 107* 95* 112*   CREATININE 6.67*   < > 9.55* 8.29* 9.63*   CALCIUM 7.2*   < > 6.9* 7.0* 7.3*   PHOS 5.0*  --  15.3*  --   --     < > = values in this interval not displayed.     Recent Labs   Lab 12/20/21  0431 12/21/21  0723 12/22/21 0439   WBC 17.01* 19.97* 21.80*   HGB 9.1* 7.5* 8.0*   HCT 25.3* 22.3* 23.0*    259 310       ASSESSMENT/PLAN:  Continue with scheduled hemodialysis for this patient.    Edwin Pryor Jr, MDT  " Propranolol Pregnancy And Lactation Text: This medication is Pregnancy Category C and it isn't known if it is safe during pregnancy. It is excreted in breast milk.

## 2023-09-18 NOTE — PLAN OF CARE
1000 Pt to georgie 3 pt placed on vent pt with trach intact pt hooked on vent with ac rate 14 tv 480 fio2 35% peep 5 pt sao2 100% pt dsg d/i to sacral area pt accu check 89  Tube feeding started per wendi rodriguez rn..    1015 pt resting well pt sao2 100% pt vs stable    1025 pt releasd to wendi medina rn bp 56/79 pulse 104 resp 20 sao2 100%   Name: Jennie Trinidad  : 1924         Chief Complaint:     Chief Complaint   Patient presents with    URI     Started with a very bad sore throat on Friday 9/15. Now has sore throat and cough, productive, some congestion. Only lozenges and honey. History of Present Illness:      Jennie Trinidad is a 80 y.o.  female who presents with URI (Started with a very bad sore throat on Friday 9/15. Now has sore throat and cough, productive, some congestion. Only lozenges and honey. )      HPI    Patient brought from assisted living facility by her son Virgilio Abreu with complaints of upper respiratory symptoms for the past 3 to 4 days. Started with some irritation in her throat, then developed nasal congestion, cough. Has not had a fever or chills. No GI symptoms. No chronic resp issues. Has tried cough drops which help. Had a neg covid test 2 days ago. Medical History:     Patient Active Problem List   Diagnosis    Esophageal reflux    Diaphragmatic hernia    Essential hypertension, benign    Pure hypercholesterolemia    Endometrial thickening on ultrasound    Cervical stenosis (uterine cervix)    Arthritis of low back    Disease due to severe acute respiratory syndrome coronavirus 2 (SARS-CoV-2)       Medications:       Prior to Admission medications    Medication Sig Start Date End Date Taking?  Authorizing Provider   CRANBERRY-VITAMIN C PO Take by mouth 4200   Yes Historical Provider, MD   benzonatate (TESSALON) 100 MG capsule Take 1 capsule by mouth 3 times daily as needed for Cough 23  Yes Yola Donovan DO   omeprazole (PRILOSEC) 20 MG delayed release capsule TAKE 1 CAPSULE EVERY DAY 23  Yes Kerwin Arreguin MD   hydroCHLOROthiazide (MICROZIDE) 12.5 MG capsule TAKE 1 CAPSULE EVERY DAY 23  Yes Kerwin Arrgeuin MD   ondansetron (ZOFRAN) 4 MG tablet Take 1 tablet by mouth 3 times daily as needed for Nausea or Vomiting 23  Yes Kerwin Arreguin MD   triamcinolone (KENALOG) 0.1 % cream no

## 2023-09-22 NOTE — PLAN OF CARE
Problem: Adult Inpatient Plan of Care  Goal: Plan of Care Review  Outcome: Ongoing, Progressing  Goal: Patient-Specific Goal (Individualized)  Outcome: Ongoing, Progressing  Goal: Absence of Hospital-Acquired Illness or Injury  Outcome: Ongoing, Progressing  Goal: Optimal Comfort and Wellbeing  Outcome: Ongoing, Progressing  Goal: Readiness for Transition of Care  Outcome: Ongoing, Progressing     Problem: Adjustment to Illness (Sepsis/Septic Shock)  Goal: Optimal Coping  Outcome: Ongoing, Progressing     Problem: Bleeding (Sepsis/Septic Shock)  Goal: Absence of Bleeding  Outcome: Ongoing, Progressing     Problem: Glycemic Control Impaired (Sepsis/Septic Shock)  Goal: Blood Glucose Level Within Desired Range  Outcome: Ongoing, Progressing     Problem: Infection Progression (Sepsis/Septic Shock)  Goal: Absence of Infection Signs and Symptoms  Outcome: Ongoing, Progressing     Problem: Nutrition Impaired (Sepsis/Septic Shock)  Goal: Optimal Nutrition Intake  Outcome: Ongoing, Progressing     Problem: Fluid and Electrolyte Imbalance (Acute Kidney Injury/Impairment)  Goal: Fluid and Electrolyte Balance  Outcome: Ongoing, Progressing     Problem: Oral Intake Inadequate (Acute Kidney Injury/Impairment)  Goal: Optimal Nutrition Intake  Outcome: Ongoing, Progressing     Problem: Renal Function Impairment (Acute Kidney Injury/Impairment)  Goal: Effective Renal Function  Outcome: Ongoing, Progressing     Problem: Infection  Goal: Absence of Infection Signs and Symptoms  Outcome: Ongoing, Progressing     Problem: Fall Injury Risk  Goal: Absence of Fall and Fall-Related Injury  Outcome: Ongoing, Progressing     Problem: Communication Impairment (Mechanical Ventilation, Invasive)  Goal: Effective Communication  Outcome: Ongoing, Progressing     Problem: Device-Related Complication Risk (Mechanical Ventilation, Invasive)  Goal: Optimal Device Function  Outcome: Ongoing, Progressing     Problem: Inability to Wean (Mechanical  Ventilation, Invasive)  Goal: Mechanical Ventilation Liberation  Outcome: Ongoing, Progressing     Problem: Nutrition Impairment (Mechanical Ventilation, Invasive)  Goal: Optimal Nutrition Delivery  Outcome: Ongoing, Progressing     Problem: Skin and Tissue Injury (Mechanical Ventilation, Invasive)  Goal: Absence of Device-Related Skin and Tissue Injury  Outcome: Ongoing, Progressing     Problem: Ventilator-Induced Lung Injury (Mechanical Ventilation, Invasive)  Goal: Absence of Ventilator-Induced Lung Injury  Outcome: Ongoing, Progressing     Problem: Communication Impairment (Artificial Airway)  Goal: Effective Communication  Outcome: Ongoing, Progressing     Problem: Device-Related Complication Risk (Artificial Airway)  Goal: Optimal Device Function  Outcome: Ongoing, Progressing     Problem: Skin and Tissue Injury (Artificial Airway)  Goal: Absence of Device-Related Skin or Tissue Injury  Outcome: Ongoing, Progressing     Problem: Skin Injury Risk Increased  Goal: Skin Health and Integrity  Outcome: Ongoing, Progressing     Problem: Device-Related Complication Risk (Hemodialysis)  Goal: Safe, Effective Therapy Delivery  Outcome: Ongoing, Progressing     Problem: Hemodynamic Instability (Hemodialysis)  Goal: Effective Tissue Perfusion  Outcome: Ongoing, Progressing     Problem: Infection (Hemodialysis)  Goal: Absence of Infection Signs and Symptoms  Outcome: Ongoing, Progressing     Problem: Diabetes Comorbidity  Goal: Blood Glucose Level Within Targeted Range  Outcome: Ongoing, Progressing     Problem: Impaired Wound Healing  Goal: Optimal Wound Healing  Outcome: Ongoing, Progressing     Problem: Gas Exchange Impaired  Goal: Optimal Gas Exchange  Outcome: Ongoing, Progressing      Detail Level: Zone

## 2024-01-13 NOTE — ASSESSMENT & PLAN NOTE
Dialysis not required today   3/7/2022 Continue with scheduled hemodialysis on MWF  3/8/2022 Dialysis session is going well.  3/10/2022 Continue with dialysis support.  3/11/2022 Dialysis as scheduled.  3/15/2022 Continue with dialysis.   Attending Attestation (For Attendings USE Only)...

## 2024-03-01 NOTE — ASSESSMENT & PLAN NOTE
- 12/14 intubated, tracheostomy 1/4 2/28  tolerating CPAP -- increase to 4 hours TID  3/1- Doing well on PSV/CPAP. Will plan for 4 hours tid again today and increase tomorrow    Statement Selected

## 2024-03-10 NOTE — PROGRESS NOTES
Wound care note;  Patient skin evaluation and dressing change performed today.  Patient in bed, alert, on ventilator   ELSA Young  FNP assessed all wounds today  Sacral wound with tan full thickness skin loss , cont Silvadene gauze to area  Rectal wound with red slow granular tissue, bleeds easily, cont Dakins moist gauze   Left heel with black soft tissue removed per ELSA Young FNP. Scant amt of bleeding noted. Edges dry., cont silvadene cream.  Right hand 95 % healed, scant area of tan slough noted, improving.  Abdomen skin graft with 80 % skin take noted, edges with red granular tissue noted.  Cont mepitel and dry dressing on MWF.  Right medial foot with purple discoloration  Right lateral ankle with areas of tan soft tissue noted, applied aquacel foam border to area.   Right thigh donor site with Xerofoam gauze intact, blees easily to distal aspect, applied dry dressing.  Cont turn protocol  Waffle boots  On low air loss mattress   Wound care team to follow   No

## 2024-12-18 NOTE — PROGRESS NOTES
"Chief Complaint  Heartburn and Black or Bloody Stool    Subjective          History Of Present Illness:    Sorin nEg is a  31 y.o. male patient of Dr. Murray with a history of heartburn, hyperlipidemia, hypertension who presents as a new over 3-year patient for evaluation of rectal bleeding.    Patient reports prior to [facility] he had one episode of rectal bleeding.  He has not had any reoccurrences since then time but it caused him significant anxiety. Patient currently denies any changes in his bowel patterns. He denies constipation or diarrhea.no rectal pain or burning.  He denies any obvious external hemorrhoids. Patient denies any abdominal pain, nausea, vomiting. Weight is stable. Appetite is good. Heartburn is controlled off medication therapy. NO dysphagia or odynophagia.     Patient denies family history of colon polyps.     Patient had labs with Dr. Palacio earlier this year with no evidence of anemia.    Objective   Vital Signs:   /85   Pulse 112   Temp 97.5 °F (36.4 °C)   Ht 182.9 cm (72\")   Wt 128 kg (282 lb)   BMI 38.25 kg/m²       Physical Exam  Vitals reviewed.   Constitutional:       General: He is not in acute distress.     Appearance: Normal appearance. He is not ill-appearing.   HENT:      Head: Normocephalic and atraumatic.      Nose: Nose normal.      Mouth/Throat:      Pharynx: Oropharynx is clear.   Eyes:      Conjunctiva/sclera: Conjunctivae normal.   Pulmonary:      Effort: Pulmonary effort is normal.   Abdominal:      General: There is no distension.      Palpations: Abdomen is soft. There is no mass.      Tenderness: There is no abdominal tenderness.   Musculoskeletal:         General: No swelling. Normal range of motion.      Cervical back: Normal range of motion.   Skin:     General: Skin is warm and dry.      Findings: No bruising or rash.   Neurological:      General: No focal deficit present.      Mental Status: He is alert and oriented to person, place, and time. " [facility]  Pulmonology  Progress Note    Patient Name: Og Garcia  MRN: [id number]  Admission Date: 12/23/2021  Hospital Length of Stay: 119 days  Code Status: Prior  Attending Provider: Cecil Abernathy DO  Primary Care Provider: Hector Arndt DNP, FNP-C   Principal Problem: Denice gangrene    Subjective:     Interval History: No complaints. Tolerating CPAP. Tmax 102.3     Objective:     Vital Signs (Most Recent):  Temp: 99.8 °F (37.7 °C) (04/21/22 0300)  Pulse: 109 (04/21/22 0820)  Resp: (!) 21 (04/21/22 0820)  BP: (!) 111/55 (04/21/22 0600)  SpO2: 100 % (04/21/22 0820)   Vital Signs (24h Range):  Temp:  [98 °F (36.7 °C)-101 °F (38.3 °C)] 99.8 °F (37.7 °C)  Pulse:  [108-117] 109  Resp:  [12-39] 21  SpO2:  [83 %-100 %] 100 %  BP: (100-150)/(48-79) 111/55     Weight: 76.6 kg (168 lb 14 oz)  Body mass index is 23.55 kg/m².      Intake/Output Summary (Last 24 hours) at 4/21/2022 1218  Last data filed at 4/21/2022 0800  Gross per 24 hour   Intake 2400 ml   Output 2700 ml   Net -300 ml       Physical Exam  Vitals reviewed.   Constitutional:       General: He is not in acute distress.     Comments: Chronically ill appearing   HENT:      Right Ear: External ear normal.      Left Ear: External ear normal.      Mouth/Throat:      Mouth: Mucous membranes are moist.   Eyes:      Pupils: Pupils are equal, round, and reactive to light.   Cardiovascular:      Rate and Rhythm: Normal rate and regular rhythm.   Pulmonary:      Breath sounds: Normal breath sounds. No wheezing, rhonchi or rales.   Abdominal:      Palpations: Abdomen is soft.   Musculoskeletal:         General: Normal range of motion.      Cervical back: Normal range of motion and neck supple.   Skin:     General: Skin is warm and dry.      Capillary Refill: Capillary refill takes less than 2 seconds.   Neurological:      Mental Status: He is alert. Mental status is at baseline.       Vents:  Vent Mode: A/C (04/21/22       Motor: No weakness.      Gait: Gait normal.   Psychiatric:         Mood and Affect: Mood normal.          Result Review :   The following data was reviewed by: Gricel Mirza PA-C on 12/18/2024:  CMP          4/25/2024    14:34 10/11/2024    09:06 11/23/2024    17:10   CMP   Glucose 118  100  90    BUN 10  12  13    Creatinine 0.71  0.76  0.89    EGFR 125.8  123.2  117.5    Sodium 138  138  139    Potassium 4.0  4.3  4.0    Chloride 103  100  103    Calcium 9.1  9.4  9.3    Total Protein 6.9  6.8  7.5    Albumin 4.3  4.3  4.5    Globulin 2.6  2.5  3.0    Total Bilirubin 0.2  0.3  0.2    Alkaline Phosphatase 73  71  79    AST (SGOT) 22  19  25    ALT (SGPT) 45  36  35    Albumin/Globulin Ratio 1.7  1.7  1.5    BUN/Creatinine Ratio 14.1  15.8  14.6    Anion Gap 11.9  14.5  13.0      CBC          4/11/2024    11:25 4/25/2024    14:34 11/23/2024    17:10   CBC   WBC 7.00  6.32  8.49    RBC 5.02  5.20  5.11    Hemoglobin 15.6  15.5  16.1    Hematocrit 45.7  45.9  46.5    MCV 91.0  88.3  91.0    MCH 31.1  29.8  31.5    MCHC 34.1  33.8  34.6    RDW 12.4  12.2  11.9    Platelets 318  354  398            Assessment and Plan    Diagnoses and all orders for this visit:    1. Rectal bleeding (Primary)  -     Case Request; Standing  -     Implement Anesthesia orders day of procedure.; Standing  -     Follow Anesthesia Guidelines / Protocol; Future  -     Verify bowel prep was successful; Standing  -     Give tap water enema if bowel prep was insufficient; Standing  -     Case Request        I recommend proceeding with colonoscopy to rule out diverticulosis, inflammatory bowel disease, microscopic colitis, ulceration, AVM, polyp, or mass/malignancy. Risks (including, but not limited to perforation, bleeding, sedation-related complications, and death), benefits, and alternatives to the procedure were discussed with the patient and all questions were answered. Patient is agreeable to plan of care.   If bleeding reoccurs  0402)  Ventilator Initiated: Yes (04/10/22 0343)  Set Rate: 14 BPM (04/21/22 0402)  Vt Set: 480 mL (04/21/22 0402)  Pressure Support: 10 cmH20 (04/20/22 1205)  PEEP/CPAP: 5 cmH20 (04/21/22 0402)  Oxygen Concentration (%): 35 (04/21/22 0402)  Peak Airway Pressure: 16 cmH2O (04/21/22 0402)  Plateau Pressure: 20 cmH20 (03/07/22 0500)  Total Ve: 7.4 mL (04/21/22 0402)  F/VT Ratio<105 (RSBI): (!) 50.28 (04/21/22 0402)    Lines/Drains/Airways       Central Venous Catheter Line  Duration                  Hemodialysis Catheter 12/30/21 0900 right internal jugular 112 days              Drain  Duration                  Colostomy 12/18/21 1030 Descending/sigmoid  days         Gastrostomy/Enterostomy 03/09/22 1436 Percutaneous endoscopic gastrostomy (PEG) midline feeding 42 days              Airway  Duration                  Surgical Airway 01/04/22 Shiley 107 days              Peripheral Intravenous Line  Duration                  Peripheral IV - Single Lumen 04/11/22 1623 18 G Anterior;Proximal;Right Forearm 9 days                    Significant Labs:    CBC/Anemia Profile:  No results for input(s): WBC, HGB, HCT, PLT, MCV, RDW, IRON, FERRITIN, RETIC, FOLATE, [id number], OCCULTBLOOD in the last 48 hours.     Chemistries:  No results for input(s): NA, K, CL, CO2, BUN, CREATININE, CALCIUM, ALBUMIN, PROT, BILITOT, ALKPHOS, ALT, AST, GLUCOSE, MG, PHOS in the last 48 hours.    All pertinent labs within the past 24 hours have been reviewed.    Significant Imaging:  I have reviewed all pertinent imaging results/findings within the past 24 hours.    Assessment/Plan:     * Denice gangrene  - s/p multiple debridements  - 2/21 back to OR for skin grafting  - 3/9 had peg tube placed     Wound culture reviewed - with pt spiking a fever will ask ID for assistance on antibiotic recommendations       Chronic respiratory failure  Was doing trach collar over the weekend but had to be placed back on the vent earlier this week   - will  treat his pain- hopefully this will improve his HR and then start back on weaning trails   4/7- aspirated yesterday - febrile this morning. Will start Clindamycin today -   4/8- clinically looks better- will resume PSV today as tolerated   4/9- increased respiratory rate and tachycardic on exam.  Patient had just been turned.  This is all likely secondary to pain.  Will treat his pain and then increase his CPAP to 4 hours t.i.d. as tolerated.  4/13- to OR today  Then  -continue PSV as tolerated   4/21- plan for16 hours PSV and rest on vent at night     Pressure ulcer of sacral region, unstageable  Culture and sensitivity reviewed -    Fever  2/28 - resp culture with pseudomonas and klebsiella  treat with zosyn for 7 days   Blood culture from 2/25 also growing Pseudomonas. Sensitive to zosyn. Will need to continue zosyn for at least 10 days total  - low grade fever since 3/28, CXR- unchanged and repeat CBC- WBC 20.57  - removed malpositioned LUE PICC.  - blood cultures- NGTD after 72  Hours   -4/6-  Temp 101 at noon yesterday- wound cultures with heavy growth gram neg bacilli - will go ahead and add Rocephin and continue to follow cultures    4/7- febrile this morning - added clinda given his aspiration event   4/8 - afebrile- would cultures reviewed - sensitive to Rocephin   4/9-  wound culture sensitivity has returned; escalating to Zosyn specially given his fever overnight.  4/10--- febrile - TMAx 103.1 - will add MRSA coverage - pan culture   4/21- Tmax 102.3 - ID consulted     ESRD (end stage renal disease)  Started on HD 12/30   Continue with HD per nephrology      Type 2 diabetes mellitus without complication, without long-term current use of insulin  Continue with current care      Acute blood loss anemia  No active bleeding  Hgb 6.0 on 4/11  Patient transfused 1 unit with dialysis yesterday  4/13 -H/H 7.5/22.7  4/15- hgb is 6.2 today. We are going to give another unit of PRBC's today with dialysis  4/16 post  would recommend adding a fiber supplement and consideration of Preparation H over-the-counter.    Follow Up   Return for Colonoscopy.    Dragon dictation used throughout this note.            Gricel Canseco PA-C   [facility]  [address] [place] [address]  Office: [phone]   transfusion cbc is pending  4/18- hgb is 6.6. Will plan for transfusion with 1 unit of prbc's with next HD  4/21 - 7.0/20.7                  FLORESITA Shafer-ACNP  Pulmonology  [facility]

## 2025-01-25 NOTE — PROGRESS NOTES
[facility]  Pulmonology  Progress Note    Patient Name: Og Garcia  MRN: [id number]  Admission Date: 12/23/2021  Hospital Length of Stay: 146 days  Code Status: Prior  Attending Provider: Cecil Abernathy DO  Primary Care Provider: Hector Arndt DNP, FNP-C   Principal Problem: Denice gangrene    Subjective:     Interval History: No acute events overnight. The patient is afebrile this am. He is currently resting comfortably. Oxygenating adequately. He is on trach collar during the day and cpap overnight.    Objective:     Vital Signs (Most Recent):  Temp: 99.8 °F (37.7 °C) (05/18/22 0400)  Pulse: 99 (05/18/22 0600)  Resp: 12 (05/18/22 0600)  BP: (!) 120/42 (05/18/22 0600)  SpO2: 100 % (05/18/22 0600)   Vital Signs (24h Range):  Temp:  [98.2 °F (36.8 °C)-100.3 °F (37.9 °C)] 99.8 °F (37.7 °C)  Pulse:  [] 99  Resp:  [8-54] 12  SpO2:  [99 %-100 %] 100 %  BP: (103-180)/(32-92) 120/42     Weight: 76.6 kg (168 lb 14 oz)  Body mass index is 23.55 kg/m².      Intake/Output Summary (Last 24 hours) at 5/18/2022 0820  Last data filed at 5/18/2022 0500  Gross per 24 hour   Intake 1450 ml   Output 50 ml   Net 1400 ml         Physical Exam    Vents:  Vent Mode: CPAP / PSV (05/18/22 0516)  Ventilator Initiated: No (04/23/22 1934)  Set Rate: 0 BPM (05/18/22 0516)  Vt Set: 0 mL (05/18/22 0516)  Pressure Support: 15 cmH20 (05/18/22 0516)  PEEP/CPAP: 5 cmH20 (05/18/22 0516)  Oxygen Concentration (%): 30 (05/18/22 0039)  Peak Airway Pressure: 19 cmH2O (05/18/22 0516)  Plateau Pressure: 20 cmH20 (03/07/22 0500)  Total Ve: 6 mL (05/18/22 0516)  F/VT Ratio<105 (RSBI): (!) 20.34 (05/16/22 0432)    Lines/Drains/Airways       Central Venous Catheter Line  Duration                  Hemodialysis Catheter 12/30/21 0900 right internal jugular 138 days              Drain  Duration                  Colostomy 12/18/21 1030 Descending/sigmoid  days         Gastrostomy/Enterostomy 03/09/22 1436  Percutaneous endoscopic gastrostomy (PEG) midline feeding 69 days              Airway  Duration                  Surgical Airway 04/27/22 0856 Shiley;Other (Comment) Cuffed;Long 20 days              Peripheral Intravenous Line  Duration                  Peripheral IV - Single Lumen 04/11/22 1623 18 G Anterior;Proximal;Right Forearm 36 days                    Significant Labs:    CBC/Anemia Profile:  No results for input(s): WBC, HGB, HCT, PLT, MCV, RDW, IRON, FERRITIN, RETIC, FOLATE, [id number], OCCULTBLOOD in the last 48 hours.       Chemistries:  No results for input(s): NA, K, CL, CO2, BUN, CREATININE, CALCIUM, ALBUMIN, PROT, BILITOT, ALKPHOS, ALT, AST, GLUCOSE, MG, PHOS in the last 48 hours.      All pertinent labs within the past 24 hours have been reviewed.    Significant Imaging:  I have reviewed all pertinent imaging results/findings within the past 24 hours.    Assessment/Plan:     Chronic respiratory failure  - previously on trach collar, regressed  - doing well with trials  - can progress to trach collar continuous if tolerate  - may need PSV for rest overnight  5/18- currently doing well with trach collar during the day and PSV overnight    Pressure ulcer of sacral region, unstageable  Wound care is following the patient  Their help reviewed and appreciated    Fever  - febrile with leukocytosis  - on 5/8 patient is felt to have aspirated overnight. Started on zosyn  - Sputum culture with Pseudomonas sensitive to zosyn. Will continue for 7 days   completed  1 blood culture positive for coag negative staph which is felt to be a contaminant    ESRD (end stage renal disease)  Started on HD 12/30   Continue with HD per nephrology      Type 2 diabetes mellitus without complication, without long-term current use of insulin  Blood sugar is well controlled    Acute blood loss anemia  No active bleeding  Hgb 6.0 on 4/11  Patient transfused 1 unit with dialysis yesterday  4/13 -H/H 7.5/22.7  4/15- hgb is 6.2 today.  We are going to give another unit of PRBC's today with dialysis  4/16 post transfusion cbc is pending  4/18- hgb is 6.6. Will plan for transfusion with 1 unit of prbc's with next HD  4/21 - 7.2/21.6   4/25- 6.6/19.7 - will transfuse one unit PRBCs with next HD   4/28- Hgb now 8.0 following transfusion  5/2/2022 hemoglobin 7 no need for transfusion yet  5/3/2022 transfuse 1 unit packed red blood cells  05/05/2022 I believe this anemia is probably related to his chronic renal sufficiency a wonder if he is a candidate for Epogen at this point  5/10- transfused 1 unit yesterday  5/12 repeat H&H in am  05/13/2022 in 2 H&H pending  Most recent 7.7/22.9    Necrotizing fasciitis  S/p multiple surgeries   Last debridement for wounds was 4/13      Hypertension  BP is stable  Currently on rate control medications (cardizem 90 mg q6 and metoprolol 12.5 bid)                     Cecil Abernathy, DO  Pulmonology  [facility]     36.6

## 2025-05-16 NOTE — NURSING
No change in pt status thi shift. No acute distress noted. Safety measures intact, cb near. Will report to on coming shift.    Refill request for Meter (Generic) Test strips and Lancets  Last office visit 4/22/2025  Next office visit 8/28/2025  Sent into [facility] in [facility]    Patient's chart has been reviewed.  Refilled as per standing order.

## 2025-07-06 NOTE — ASSESSMENT & PLAN NOTE
No active bleeding  Hgb 6.0 on 4/11  Patient transfused 1 unit with dialysis yesterday  4/13 -H/H 7.5/22.7  4/15- hgb is 6.2 today. We are going to give another unit of PRBC's today with dialysis  4/16 post transfusion cbc is pending  4/18- hgb is 6.6. Will plan for transfusion with 1 unit of prbc's with next HD  4/21 - 7.2/21.6   4/25- 6.6/19.7 - will transfuse one unit PRBCs with next HD   4/28- Hgb now 8.0 following transfusion  5/2/2022 hemoglobin 7 no need for transfusion yet  5/3/2022 transfuse 1 unit packed red blood cells  05/05/2022 I believe this anemia is probably related to his chronic renal sufficiency a wonder if he is a candidate for Epogen at this point  5/10- transfused 1 unit yesterday  5/12 repeat H&H in am  05/13/2022 in 2 H&H pending  Most recent 7.7/22.9   No

## (undated) DEVICE — GLOVE SURGICAL PROTEXIS PI SIZE 8.0

## (undated) DEVICE — SPONGE GAUZE 4X4 12 PLY STL AMD 10/TRAY

## (undated) DEVICE — ADHESIVE LIQUID MASTISOL 2/3CC

## (undated) DEVICE — SPONGE LAP STL 12X12IN PK/5 MD

## (undated) DEVICE — TRAY SKIN PREP PROVIDONE IODINE STERILE LATEX FREE

## (undated) DEVICE — CURETTE DERMAL DISP 5MM

## (undated) DEVICE — SOL IRRIGATION SALINE 0.9% 1000ML BOTTLE

## (undated) DEVICE — MARKER SURGICAL PEN & RULER STERILE

## (undated) DEVICE — BANDAGE KERLIX AMD  4.5IN  SPONGE ROLL

## (undated) DEVICE — PENCIL HANDSWITCHING ROCKER SW

## (undated) DEVICE — CANISTER INVIA LIBERTY 800ML W/SOLIDIFIER (MEDELA)

## (undated) DEVICE — BLADE INSULATED COATED 6IN

## (undated) DEVICE — GOWN SURGICAL SMARTGOWN LEVEL 4 / EXTRA LARGE STERILE

## (undated) DEVICE — CDS BASIC

## (undated) DEVICE — APPLICATOR CHLORAPREP HI-LITE TINTED ORANGE 26ML

## (undated) DEVICE — LEGGINGS SURGICAL 6IN STERILE

## (undated) DEVICE — COUNTER SHARPS FOAM BLOCK MAGNET 20-40

## (undated) DEVICE — GLOVE SURGICAL PROTEXIS PI BLUE SIZE 8.0

## (undated) DEVICE — DISH PETRI STERILE (50 PK/CS)

## (undated) DEVICE — GLOVE SURGICAL PROTEXIS PI SIZE 7

## (undated) DEVICE — BLADE BOVIE INSULATED COATED 2.75IN

## (undated) DEVICE — KIT COLOSTOMY/ILEOSTOMY MOLDABLE DRAIN 2 3/4 INCH

## (undated) DEVICE — DRAIN PENROSE LATEX 1/2IN X 18IN STERILE

## (undated) DEVICE — Device

## (undated) DEVICE — SYRINGE ASEPTO IRRIGATION BULB 60CC LF DISP

## (undated) DEVICE — GLOVE SURGICAL PROTEXIS PI BLUE SIZE 7.0

## (undated) DEVICE — SUTURE SILK 2-0 18 BLACK TIES

## (undated) DEVICE — KIT DRESSING INVIA FOAM W/FITPAD LARGE (MEDELA)

## (undated) DEVICE — BANDAGE ELASTIC 4IN X 5YD W/CLIP (STERILE)

## (undated) DEVICE — GLOVE SURGICAL PROTEXIS PI BLUE SIZE 6.5

## (undated) DEVICE — DRESSING ABD SURGIPAD 8X7.5IN

## (undated) DEVICE — BLADE SURG SS STERISHARP SZ 15

## (undated) DEVICE — TUBING INVIA LIBERTY DBL LUMEN W/QUICK CONNECT (MEDELA)

## (undated) DEVICE — GLOVE SURGICAL PROTEXIS PI BLUE SIZE 6.0

## (undated) DEVICE — GLOVE SURGICAL PROTEXIS PI SIZE 6

## (undated) DEVICE — HANDPIECE LIGASURE IMPACT DISP

## (undated) DEVICE — COVER MAYO STAND W/PAD 23X54IN

## (undated) DEVICE — SPONGE LAP STRL 18X18 5/PK

## (undated) DEVICE — GLOVE SURGICAL PROTEXIS PI SIZE 6.5

## (undated) DEVICE — COVER LIGHT HANDLE FLEX PK/2

## (undated) DEVICE — CDS LITHOTOMY

## (undated) DEVICE — GLOVE SURGICAL PROTEXIS PI SIZE 7.5

## (undated) DEVICE — DRESSING XEROFORM 5X9

## (undated) DEVICE — TOWEL OR STERILE BLUE 4/PK 20PK/CS

## (undated) DEVICE — GLOVE SURGICAL PROTEXIS PI CLASSIC SIZE 6.5

## (undated) DEVICE — STAPLER SKIN PROXIMATE PLUS MD 35 WIDE DISP

## (undated) DEVICE — GLOVE SURG BIOGEL SZ 7.5

## (undated) DEVICE — GOWN SURGICAL STERILE LEVEL 3 / XX-LARGE

## (undated) DEVICE — SUTURE PDS 1 MONO VIOL TP-1 48

## (undated) DEVICE — GOWN LARGE NON REINFORCED

## (undated) DEVICE — SYRINGE 10-12CC LURE -LOK TIP

## (undated) DEVICE — PACK TIBURON UNIVERSAL

## (undated) DEVICE — TUBING SUCTION 5MM STERILE 3/16IN X 12FT

## (undated) DEVICE — CUTTER LINEAR GIA 75MM BLUE

## (undated) DEVICE — BLADE CARBON SURG SS SZ 15 STERILE

## (undated) DEVICE — COUNTER SHARPS FOAM BLOCK MAGNET 40-70

## (undated) DEVICE — HANDLE YANKAUER SUCTION K80 LATEX FREE

## (undated) DEVICE — SUTURE SILK 0-18 BLACK TIES LA

## (undated) DEVICE — SUTURE CHROMIC 4-0 27 FS-2

## (undated) DEVICE — SUTURE VICRYL 3-0  SH UNDYED 18IN

## (undated) DEVICE — CULTURETTE ANAEROBIC COLLECTOR

## (undated) DEVICE — SUTURE SILK 3-0 30 BLACK SH CO

## (undated) DEVICE — BLADE DERMATOME STERILE

## (undated) DEVICE — CULTURETTE AEROBIC SWAB

## (undated) DEVICE — IRRIGATOR INTERPULSE HANDPIECE SET

## (undated) DEVICE — SUTURE SILK 2-0 POPOFF